# Patient Record
Sex: MALE | Race: WHITE | NOT HISPANIC OR LATINO | Employment: PART TIME | URBAN - METROPOLITAN AREA
[De-identification: names, ages, dates, MRNs, and addresses within clinical notes are randomized per-mention and may not be internally consistent; named-entity substitution may affect disease eponyms.]

---

## 2017-01-03 ENCOUNTER — OFFICE VISIT (OUTPATIENT)
Dept: NEPHROLOGY | Facility: CLINIC | Age: 37
End: 2017-01-03
Attending: INTERNAL MEDICINE
Payer: MEDICARE

## 2017-01-03 ENCOUNTER — TELEPHONE (OUTPATIENT)
Dept: OTOLARYNGOLOGY | Facility: CLINIC | Age: 37
End: 2017-01-03

## 2017-01-03 ENCOUNTER — RESULTS ONLY (OUTPATIENT)
Dept: OTHER | Facility: CLINIC | Age: 37
End: 2017-01-03

## 2017-01-03 VITALS
DIASTOLIC BLOOD PRESSURE: 80 MMHG | BODY MASS INDEX: 27.17 KG/M2 | TEMPERATURE: 98.5 F | OXYGEN SATURATION: 99 % | WEIGHT: 189.8 LBS | HEIGHT: 70 IN | HEART RATE: 76 BPM | SYSTOLIC BLOOD PRESSURE: 130 MMHG

## 2017-01-03 DIAGNOSIS — Z94.4 LIVER TRANSPLANTED (H): ICD-10-CM

## 2017-01-03 DIAGNOSIS — E83.42 HYPOMAGNESEMIA: ICD-10-CM

## 2017-01-03 DIAGNOSIS — Z94.0 KIDNEY REPLACED BY TRANSPLANT: ICD-10-CM

## 2017-01-03 DIAGNOSIS — R61 NIGHT SWEATS: ICD-10-CM

## 2017-01-03 DIAGNOSIS — Z48.298 AFTERCARE FOLLOWING ORGAN TRANSPLANT: ICD-10-CM

## 2017-01-03 DIAGNOSIS — D84.9 IMMUNOSUPPRESSED STATUS (H): ICD-10-CM

## 2017-01-03 DIAGNOSIS — N25.81 SECONDARY RENAL HYPERPARATHYROIDISM (H): ICD-10-CM

## 2017-01-03 DIAGNOSIS — R59.1 LYMPHADENOPATHY: Primary | ICD-10-CM

## 2017-01-03 DIAGNOSIS — R59.1 LYMPHADENOPATHY: ICD-10-CM

## 2017-01-03 DIAGNOSIS — Z94.4 LIVER REPLACED BY TRANSPLANT (H): ICD-10-CM

## 2017-01-03 DIAGNOSIS — D69.6 THROMBOCYTOPENIA (H): ICD-10-CM

## 2017-01-03 DIAGNOSIS — R59.9 LYMPH NODE ENLARGEMENT: ICD-10-CM

## 2017-01-03 LAB
ALBUMIN SERPL-MCNC: 4.6 G/DL (ref 3.4–5)
ALP SERPL-CCNC: 111 U/L (ref 40–150)
ALT SERPL W P-5'-P-CCNC: 24 U/L (ref 0–70)
ANION GAP SERPL CALCULATED.3IONS-SCNC: 6 MMOL/L (ref 3–14)
AST SERPL W P-5'-P-CCNC: 17 U/L (ref 0–45)
BASOPHILS # BLD AUTO: 0 10E9/L (ref 0–0.2)
BASOPHILS NFR BLD AUTO: 0.4 %
BILIRUB SERPL-MCNC: 0.6 MG/DL (ref 0.2–1.3)
BUN SERPL-MCNC: 18 MG/DL (ref 7–30)
CALCIUM SERPL-MCNC: 9.6 MG/DL (ref 8.5–10.1)
CHLORIDE SERPL-SCNC: 108 MMOL/L (ref 94–109)
CO2 SERPL-SCNC: 27 MMOL/L (ref 20–32)
CREAT SERPL-MCNC: 1.21 MG/DL (ref 0.66–1.25)
CREAT UR-MCNC: 105 MG/DL
DEPRECATED CALCIDIOL+CALCIFEROL SERPL-MC: 13 UG/L (ref 20–75)
DIFFERENTIAL METHOD BLD: ABNORMAL
EOSINOPHIL # BLD AUTO: 0.1 10E9/L (ref 0–0.7)
EOSINOPHIL NFR BLD AUTO: 1.1 %
ERYTHROCYTE [DISTWIDTH] IN BLOOD BY AUTOMATED COUNT: 12.4 % (ref 10–15)
GFR SERPL CREATININE-BSD FRML MDRD: 68 ML/MIN/1.7M2
GLUCOSE SERPL-MCNC: 91 MG/DL (ref 70–99)
HCT VFR BLD AUTO: 45 % (ref 40–53)
HGB BLD-MCNC: 15.3 G/DL (ref 13.3–17.7)
IMM GRANULOCYTES # BLD: 0.1 10E9/L (ref 0–0.4)
IMM GRANULOCYTES NFR BLD: 2 %
LYMPHOCYTES # BLD AUTO: 0.6 10E9/L (ref 0.8–5.3)
LYMPHOCYTES NFR BLD AUTO: 13.2 %
MAGNESIUM SERPL-MCNC: 2.3 MG/DL (ref 1.6–2.3)
MCH RBC QN AUTO: 30.3 PG (ref 26.5–33)
MCHC RBC AUTO-ENTMCNC: 34 G/DL (ref 31.5–36.5)
MCV RBC AUTO: 89 FL (ref 78–100)
MONOCYTES # BLD AUTO: 0.3 10E9/L (ref 0–1.3)
MONOCYTES NFR BLD AUTO: 6.2 %
NEUTROPHILS # BLD AUTO: 3.5 10E9/L (ref 1.6–8.3)
NEUTROPHILS NFR BLD AUTO: 77.1 %
NRBC # BLD AUTO: 0 10*3/UL
NRBC BLD AUTO-RTO: 0 /100
PHOSPHATE SERPL-MCNC: 2.5 MG/DL (ref 2.5–4.5)
PLATELET # BLD AUTO: 101 10E9/L (ref 150–450)
POTASSIUM SERPL-SCNC: 4.4 MMOL/L (ref 3.4–5.3)
PROT SERPL-MCNC: 8.1 G/DL (ref 6.8–8.8)
PROT UR-MCNC: 0.12 G/L
PROT/CREAT 24H UR: 0.12 G/G CR (ref 0–0.2)
PTH-INTACT SERPL-MCNC: 184 PG/ML (ref 12–72)
RBC # BLD AUTO: 5.05 10E12/L (ref 4.4–5.9)
SODIUM SERPL-SCNC: 140 MMOL/L (ref 133–144)
WBC # BLD AUTO: 4.5 10E9/L (ref 4–11)

## 2017-01-03 PROCEDURE — 99212 OFFICE O/P EST SF 10 MIN: CPT | Mod: ZF

## 2017-01-03 PROCEDURE — 87799 DETECT AGENT NOS DNA QUANT: CPT | Performed by: SURGERY

## 2017-01-03 PROCEDURE — 82306 VITAMIN D 25 HYDROXY: CPT | Performed by: SURGERY

## 2017-01-03 PROCEDURE — 84156 ASSAY OF PROTEIN URINE: CPT | Performed by: INTERNAL MEDICINE

## 2017-01-03 PROCEDURE — 83970 ASSAY OF PARATHORMONE: CPT | Performed by: SURGERY

## 2017-01-03 PROCEDURE — 80053 COMPREHEN METABOLIC PANEL: CPT | Performed by: SURGERY

## 2017-01-03 PROCEDURE — 86833 HLA CLASS II HIGH DEFIN QUAL: CPT | Performed by: TRANSPLANT SURGERY

## 2017-01-03 PROCEDURE — 86832 HLA CLASS I HIGH DEFIN QUAL: CPT | Performed by: TRANSPLANT SURGERY

## 2017-01-03 PROCEDURE — 85025 COMPLETE CBC W/AUTO DIFF WBC: CPT | Performed by: SURGERY

## 2017-01-03 PROCEDURE — 36415 COLL VENOUS BLD VENIPUNCTURE: CPT | Performed by: SURGERY

## 2017-01-03 PROCEDURE — 84100 ASSAY OF PHOSPHORUS: CPT | Performed by: SURGERY

## 2017-01-03 PROCEDURE — 83735 ASSAY OF MAGNESIUM: CPT | Performed by: SURGERY

## 2017-01-03 PROCEDURE — 80197 ASSAY OF TACROLIMUS: CPT | Performed by: SURGERY

## 2017-01-03 ASSESSMENT — PAIN SCALES - GENERAL: PAINLEVEL: NO PAIN (0)

## 2017-01-03 NOTE — PROGRESS NOTES
"Assessment and Plan:  1. DDKT - baseline Cr ~ 1.0-1.2, which has been a bit higher with last labs running ~ 1.3.  Patient did have DSA at time of transplant and will repeat DSA today.  Will also check urine prot/cr.  If creatinine remains elevated, would consider kidney transplant biopsy.  Will make no changes in immunosuppression.  2. HTN - well controlled at target of less than 140/90.  No changes.  3. Secondary renal hyperparathyroidism - will check PTH and vitamin D level.  4. Hypomagnesemia - normal serum magnesium level and will continue on oral magnesium supplement.  5. Thrombocytopenia - stable platelet level generally 60-80s.  6. Liver transplant - appears stable with normal LFTs.  7. Right anterior cervical lymph node - worrisome for lymphoma with no signs or symptoms of infection.  Will check serum EBV PCR and obtain neck CT for soft tissue.  Patient will likely require FNA.  8. Night sweats and fatigue - will check serum CMV and EBV PCR.  9. Recommend return visit in 3 months.    Assessment and plan was discussed with patient and he voiced his understanding and agreement.    Reason for Visit:  Mr. Chen is here for routine follow up.    HPI:   Cricket Chen is a 36 year old male with ESKD from [facility] and ESLD secondary alcoholic cirrhosis and is status post DDKT and liver transplant on 5/11/16.         Transplant Hx:       Tx: DDKT  Date: 5/11/16       Tx: Liver Tx Date: 5/11/16       Present Maintenance IS: Tacrolimus and Mycophenolate mofetil       Baseline Creatinine: 1.0-1.2       Recent DSA: Yes  Date last checked: 5/2016       Biopsy: No    Mr. Chen reports feeling good overall with some medical complaints.  He notes waking up about 10 days ago and seeing a large \"lymph node\" on the right side of his neck.  He notes it is not painful and hasn't increased in size.  No sore throat, sinus congestion or drainage, or tooth pain.  No fever, sweats or chills.  He did have some night sweats a couple " "of times last week, which is new.  He was seen in primary care clinic and rapid strept test was negative.  Nothing has changed in the mass since it showed up.    His energy level is \"good, not great\" as he feels a bit more tired, especially by the afternoon.  Patient feels this is because he has insomnia and has a hard time getting to sleep.  He is active, but really gets minimal exercise.  Denies any chest pain or shortness of breath with exertion.  Appetite is good and he is drinking plenty of fluids.  Weight is up about 20 lbs since transplant, but more stable lately.  No nausea, vomiting or diarrhea.  Slight leg swelling by the end of the day.    Home BP: 120/70s.      ROS:   A comprehensive review of systems was obtained and negative, except as noted in the HPI or PMH.    Active Medical Problems:  Patient Active Problem List   Diagnosis     Atrial flutter (H)     Complete transposition of great vessels     Esophageal varices (H)     Insomnia     Alcoholic hepatitis     History of alcohol abuse     History of transposition of great vessels     Depression     Thrombocytopenia (H)     Pain medication agreement     Congenital anomaly of heart     Liver replaced by transplant (H)     Kidney replaced by transplant     Immunosuppressed status (H)     Hypomagnesemia     Secondary renal hyperparathyroidism (H)     Chronic pain syndrome     Pain management contract signed     Aftercare following organ transplant       Personal Hx:  Social History     Social History     Marital Status: Single     Spouse Name: N/A     Number of Children: 0     Years of Education: N/A     Occupational History      Unemployed     Social History Main Topics     Smoking status: Former Smoker -- 0.33 packs/day for 3 years     Types: Cigarettes     Start date: 08/20/1997     Quit date: 09/21/2000     Smokeless tobacco: Former User     Alcohol Use: No      Comment: ~10 drinks per day for ten years, quit in April of 2014     Drug Use: No     " "Sexual Activity:     Partners: Female     Birth Control/ Protection: None     Other Topics Concern     Not on file     Social History Narrative    ? Blood transfusion as baby during surgery,    Professional performed tattoos     No Illicit IV or intranasal drug use.        Allergies:  No Known Allergies    Medications:  Prior to Admission medications    Medication Sig Start Date End Date Taking? Authorizing Provider   traMADol (ULTRAM) 50 MG tablet Take 1 tablet (50 mg) by mouth every 8 hours as needed for moderate pain 12/14/16  Yes Antoinette Nelson MD   magnesium oxide (MAG-OX) 400 (241.3 MG) MG tablet Take 2 tablets (800 mg) by mouth 2 times daily 12/8/16  Yes Jake Sidhu MD   PROGRAF 1 MG PO CAPSULE 4 mg AM and 3 mg PM 10/26/16  Yes Laureen Galvan MD   amLODIPine (NORVASC) 10 MG tablet Take 1 tablet (10 mg) by mouth daily 10/11/16  Yes Jake Sidhu MD   lisinopril (PRINIVIL,ZESTRIL) 5 MG tablet Take 1 tablet (5 mg) by mouth daily 10/11/16  Yes Jake Sidhu MD   mycophenolate (CELLCEPT - GENERIC EQUIVALENT) 250 MG capsule Take 3 capsules (750 mg) by mouth 2 times daily 8/5/16  Yes Vinod Bermeo MD   sulfamethoxazole-trimethoprim (BACTRIM,SEPTRA) 400-80 MG per tablet Take 1 tablet by mouth daily 5/17/16  Yes Lorenza Alonso PA-C   pantoprazole (PROTONIX) 20 MG tablet Take 1 tablet (20 mg) by mouth daily 3/16/16  Yes Laureen Galvan MD       Vitals:  /80 mmHg  Pulse 76  Temp(Src) 98.5  F (36.9  C) (Oral)  Ht 1.778 m (5' 10\")  Wt 86.093 kg (189 lb 12.8 oz)  BMI 27.23 kg/m2  SpO2 99%    Exam:   GENERAL APPEARANCE: alert and no distress  HENT: mouth without ulcers or lesions  LYMPHATICS: 2 x 2 cm right anterio cervical lymph node that is nontender; no other cervical or supraclavicular nodes  RESP: lungs clear to auscultation - no rales, rhonchi or wheezes  CV: regular rhythm, normal rate, no rub, no murmur  EDEMA: no LE " edema bilaterally  ABDOMEN: soft, nondistended, nontender, bowel sounds normal  MS: extremities normal - no gross deformities noted, no evidence of inflammation in joints, no muscle tenderness  SKIN: no rash  TX KIDNEY: normal    Results:   Recent Results (from the past 504 hour(s))   CBC with platelets    Collection Time: 12/19/16 10:44 AM   Result Value Ref Range    WBC 4.8 4.0 - 11.0 10e9/L    RBC Count 4.93 4.4 - 5.9 10e12/L    Hemoglobin 14.7 13.3 - 17.7 g/dL    Hematocrit 44.5 40.0 - 53.0 %    MCV 90 78 - 100 fl    MCH 29.8 26.5 - 33.0 pg    MCHC 33.0 31.5 - 36.5 g/dL    RDW 12.8 10.0 - 15.0 %    Platelet Count 90 (L) 150 - 450 10e9/L   Basic metabolic panel    Collection Time: 12/19/16 10:44 AM   Result Value Ref Range    Sodium 140 133 - 144 mmol/L    Potassium 4.5 3.4 - 5.3 mmol/L    Chloride 108 94 - 109 mmol/L    Carbon Dioxide 23 20 - 32 mmol/L    Anion Gap 9 3 - 14 mmol/L    Glucose 108 (H) 70 - 99 mg/dL    Urea Nitrogen 16 7 - 30 mg/dL    Creatinine 1.30 (H) 0.66 - 1.25 mg/dL    GFR Estimate 62 >60 mL/min/1.7m2    GFR Estimate If Black 75 >60 mL/min/1.7m2    Calcium 9.6 8.5 - 10.1 mg/dL   Phosphorus    Collection Time: 12/19/16 10:44 AM   Result Value Ref Range    Phosphorus 2.8 2.5 - 4.5 mg/dL   Magnesium    Collection Time: 12/19/16 10:44 AM   Result Value Ref Range    Magnesium 1.6 1.6 - 2.3 mg/dL   Hepatic panel    Collection Time: 12/19/16 10:44 AM   Result Value Ref Range    Bilirubin Direct 0.1 0.0 - 0.2 mg/dL    Bilirubin Total 0.4 0.2 - 1.3 mg/dL    Albumin 4.3 3.4 - 5.0 g/dL    Protein Total 7.3 6.8 - 8.8 g/dL    Alkaline Phosphatase 92 40 - 150 U/L    ALT 34 0 - 70 U/L    AST 18 0 - 45 U/L   Tacrolimus level    Collection Time: 12/19/16 10:45 AM   Result Value Ref Range    Tacrolimus Last Dose  at 10:45 pm on 12/18/2016     Tacrolimus Level 12.1 5.0 - 15.0 ug/L   Strep, Rapid Screen    Collection Time: 12/26/16 12:30 PM   Result Value Ref Range    Specimen Description Throat     Rapid Strep  A Screen       NEGATIVE: No Group A streptococcal antigen detected by immunoassay, await   culture report.      Micro Report Status FINAL 12/26/2016    Beta strep group A culture    Collection Time: 12/26/16 12:30 PM   Result Value Ref Range    Specimen Description Throat     Culture Micro No Beta Streptococcus isolated     Micro Report Status FINAL 12/28/2016    CBC with platelets differential    Collection Time: 12/26/16 12:38 PM   Result Value Ref Range    WBC 5.2 4.0 - 11.0 10e9/L    RBC Count 4.80 4.4 - 5.9 10e12/L    Hemoglobin 14.8 13.3 - 17.7 g/dL    Hematocrit 42.9 40.0 - 53.0 %    MCV 89 78 - 100 fl    MCH 30.8 26.5 - 33.0 pg    MCHC 34.5 31.5 - 36.5 g/dL    RDW 12.6 10.0 - 15.0 %    Platelet Count 84 (L) 150 - 450 10e9/L    Diff Method Automated Method     % Neutrophils 78.1 %    % Lymphocytes 12.5 %    % Monocytes 7.7 %    % Eosinophils 1.3 %    % Basophils 0.4 %    Absolute Neutrophil 4.1 1.6 - 8.3 10e9/L    Absolute Lymphocytes 0.7 (L) 0.8 - 5.3 10e9/L    Absolute Monocytes 0.4 0.0 - 1.3 10e9/L    Absolute Eosinophils 0.1 0.0 - 0.7 10e9/L    Absolute Basophils 0.0 0.0 - 0.2 10e9/L

## 2017-01-03 NOTE — MR AVS SNAPSHOT
After Visit Summary   1/3/2017    Cricket Chen    MRN: [id number]           Patient Information     Date Of Birth          1980        Visit Information        Provider Department      1/3/2017 12:00 PM Jake Sidhu MD [facility] Ne[facility]        Today's Diagnoses     Lymphadenopathy    -  1     Kidney replaced by transplant         Night sweats         Secondary renal hyperparathyroidism (H)         Immunosuppressed status (H)         Liver replaced by transplant (H)         Hypomagnesemia         Thrombocytopenia (H)         Aftercare following organ transplant            Follow-ups after your visit        Follow-up notes from your care team     Return in about 3 months (around 4/3/2017).      Your next 10 appointments already scheduled     Jan 23, 2017 10:45 AM   [facility]    [address] [place] [id number] [phone]           Please do not eat 10-12 hours before your appointment if you are coming in fasting for labs on lipids, cholesterol, or glucose (sugar). Does not apply to pregnant women.  Water with medications is okay. Do not drink coffee or other fluids.  If you have concerns about taking  your medications, please send a message by clicking on Secure Messaging, Message Your Care Team.            Apr 04, 2017 12:10 PM   (Arrive by 11:55 AM)   Return General Liver with Laureen Galvan MD   [facility] Hepat[facility] ([facility])    [address] [place] [address] [id number] [phone]            Apr 18, 2017 10:00 AM   (Arrive by 9:30 AM)   Return Kidney Transplant with Jake Sidhu MD   [facility]ph[facility] ([facility])    [address] [place] [id number] [phone]              Future tests that were ordered for you today     Open Future Orders        Priority Expected Expires  "Ordered    Protein  random urine Routine  2/2/2017 1/3/2017    Parathyroid Hormone Intact Routine  2/2/2017 1/3/2017    Vitamin D Deficiency Routine  2/2/2017 1/3/2017    CMV DNA quantification Routine  2/2/2017 1/3/2017    EBV DNA PCR Quantitative Whole Blood Routine  2/2/2017 1/3/2017    Basic metabolic panel Routine  2/2/2017 1/3/2017    CBC with platelets Routine  2/2/2017 1/3/2017    CT Soft Tissue Neck w/o & w Contrast Routine  1/3/2018 1/3/2017            Who to contact     If you have questions or need follow up information about today's clinic visit or your schedule please contact [facility] NE[facility]ROLOG[facility] directly at [phone].  Normal or non-critical lab and imaging results will be communicated to you by [employer], letter or phone within 4 business days after the clinic has received the results. If you do not hear from us within 7 days, please contact the clinic through [employer] or phone. If you have a critical or abnormal lab result, we will notify you by phone as soon as possible.  Submit refill requests through [employer] or call your pharmacy and they will forward the refill request to us. Please allow 3 business days for your refill to be completed.          Additional Information About Your Visit        [employer] Information     [employer] gives you secure access to your electronic health record. If you see a primary care provider, you can also send messages to your care team and make appointments. If you have questions, please call your primary care clinic.  If you do not have a primary care provider, please call [phone] and they will assist you.        Care EveryWhere ID     This is your Care EveryWhere ID. This could be used by other organizations to access your [place] medical records  [id number]        Your Vitals Were     Pulse Temperature Height BMI (Body Mass Index) Pulse Oximetry       76 98.5  F (36.9  C) (Oral) 1.778 m (5' 10\") 27.23 kg/m2 99%        Blood Pressure from Last 3 Encounters: "   01/03/17 130/80   12/26/16 128/79   09/27/16 119/72    Weight from Last 3 Encounters:   01/03/17 86.093 kg (189 lb 12.8 oz)   12/26/16 87.091 kg (192 lb)   09/27/16 76.703 kg (169 lb 1.6 oz)              We Performed the Following     PRA Donor Specific Antibody        Primary Care Provider Office Phone # Fax #    Eugenia Perez [phone]       [address]  [facility] [address]        Thank you!     Thank you for choosing [facility] NEPH[facility]OLOG[facility]  for your care. Our goal is always to provide you with excellent care. Hearing back from our patients is one way we can continue to improve our services. Please take a few minutes to complete the written survey that you may receive in the mail after your visit with us. Thank you!             Your Updated Medication List - Protect others around you: Learn how to safely use, store and throw away your medicines at www.disposemymeds.org.          This list is accurate as of: 1/3/17 12:45 PM.  Always use your most recent med list.                   Brand Name Dispense Instructions for use    amLODIPine 10 MG tablet    NORVASC    90 tablet    Take 1 tablet (10 mg) by mouth daily       lisinopril 5 MG tablet    PRINIVIL/ZESTRIL    90 tablet    Take 1 tablet (5 mg) by mouth daily       magnesium oxide 400 (241.3 MG) MG tablet    MAG-OX    120 tablet    Take 2 tablets (800 mg) by mouth 2 times daily       mycophenolate 250 MG capsule    CELLCEPT - GENERIC EQUIVALENT    180 capsule    Take 3 capsules (750 mg) by mouth 2 times daily       pantoprazole 20 MG EC tablet    PROTONIX    90 tablet    Take 1 tablet (20 mg) by mouth daily       sulfamethoxazole-trimethoprim 400-80 MG per tablet    BACTRIM/SEPTRA    30 tablet    Take 1 tablet by mouth daily       tacrolimus capsule     210 capsule    4 mg AM and 3 mg PM       traMADol 50 MG tablet    ULTRAM    45 tablet    Take 1 tablet (50 mg) by mouth every 8 hours as needed for moderate  pain

## 2017-01-03 NOTE — NURSING NOTE
"Chief Complaint   Patient presents with     RECHECK     kidney transplant follow up       Initial /80 mmHg  Pulse 76  Temp(Src) 98.5  F (36.9  C) (Oral)  Ht 1.778 m (5' 10\")  Wt 86.093 kg (189 lb 12.8 oz)  BMI 27.23 kg/m2  SpO2 99% Estimated body mass index is 27.23 kg/(m^2) as calculated from the following:    Height as of this encounter: 1.778 m (5' 10\").    Weight as of this encounter: 86.093 kg (189 lb 12.8 oz).  BP completed using cuff size: regular    "

## 2017-01-03 NOTE — Clinical Note
"1/3/2017      RE: Cricket Chen  [address] [facility] [address]       Assessment and Plan:  1. DDKT - baseline Cr ~ 1.0-1.2, which has been a bit higher with last labs running ~ 1.3.  Patient did have DSA at time of transplant and will repeat DSA today.  Will also check urine prot/cr.  If creatinine remains elevated, would consider kidney transplant biopsy.  Will make no changes in immunosuppression.  2. HTN - well controlled at target of less than 140/90.  No changes.  3. Secondary renal hyperparathyroidism - will check PTH and vitamin D level.  4. Hypomagnesemia - normal serum magnesium level and will continue on oral magnesium supplement.  5. Thrombocytopenia - stable platelet level generally 60-80s.  6. Liver transplant - appears stable with normal LFTs.  7. Right anterior cervical lymph node - worrisome for lymphoma with no signs or symptoms of infection.  Will check serum EBV PCR and obtain neck CT for soft tissue.  Patient will likely require FNA.  8. Night sweats and fatigue - will check serum CMV and EBV PCR.  9. Recommend return visit in 3 months.    Assessment and plan was discussed with patient and he voiced his understanding and agreement.    Reason for Visit:  Mr. Chen is here for routine follow up.    HPI:   Cricket Chen is a 36 year old male with ESKD from [facility] and ESLD secondary alcoholic cirrhosis and is status post DDKT and liver transplant on 5/11/16.         Transplant Hx:       Tx: DDKT  Date: 5/11/16       Tx: Liver Tx Date: 5/11/16       Present Maintenance IS: Tacrolimus and Mycophenolate mofetil       Baseline Creatinine: 1.0-1.2       Recent DSA: Yes  Date last checked: 5/2016       Biopsy: No    Mr. Chen reports feeling good overall with some medical complaints.  He notes waking up about 10 days ago and seeing a large \"lymph node\" on the right side of his neck.  He notes it is not painful and hasn't increased in size.  No sore throat, sinus congestion or " "drainage, or tooth pain.  No fever, sweats or chills.  He did have some night sweats a couple of times last week, which is new.  He was seen in primary care clinic and rapid strept test was negative.  Nothing has changed in the mass since it showed up.    His energy level is \"good, not great\" as he feels a bit more tired, especially by the afternoon.  Patient feels this is because he has insomnia and has a hard time getting to sleep.  He is active, but really gets minimal exercise.  Denies any chest pain or shortness of breath with exertion.  Appetite is good and he is drinking plenty of fluids.  Weight is up about 20 lbs since transplant, but more stable lately.  No nausea, vomiting or diarrhea.  Slight leg swelling by the end of the day.    Home BP: 120/70s.      ROS:   A comprehensive review of systems was obtained and negative, except as noted in the HPI or PMH.    Active Medical Problems:  Patient Active Problem List   Diagnosis     Atrial flutter (H)     Complete transposition of great vessels     Esophageal varices (H)     Insomnia     Alcoholic hepatitis     History of alcohol abuse     History of transposition of great vessels     Depression     Thrombocytopenia (H)     Pain medication agreement     Congenital anomaly of heart     Liver replaced by transplant (H)     Kidney replaced by transplant     Immunosuppressed status (H)     Hypomagnesemia     Secondary renal hyperparathyroidism (H)     Chronic pain syndrome     Pain management contract signed     Aftercare following organ transplant       Personal Hx:  Social History     Social History     Marital Status: Single     Spouse Name: N/A     Number of Children: 0     Years of Education: N/A     Occupational History      Unemployed     Social History Main Topics     Smoking status: Former Smoker -- 0.33 packs/day for 3 years     Types: Cigarettes     Start date: 08/20/1997     Quit date: 09/21/2000     Smokeless tobacco: Former User     Alcohol Use: No    " "  Comment: ~10 drinks per day for ten years, quit in April of 2014     Drug Use: No     Sexual Activity:     Partners: Female     Birth Control/ Protection: None     Other Topics Concern     Not on file     Social History Narrative    ? Blood transfusion as baby during surgery,    Professional performed tattoos     No Illicit IV or intranasal drug use.        Allergies:  No Known Allergies    Medications:  Prior to Admission medications    Medication Sig Start Date End Date Taking? Authorizing Provider   traMADol (ULTRAM) 50 MG tablet Take 1 tablet (50 mg) by mouth every 8 hours as needed for moderate pain 12/14/16  Yes Antoinette Nelson MD   magnesium oxide (MAG-OX) 400 (241.3 MG) MG tablet Take 2 tablets (800 mg) by mouth 2 times daily 12/8/16  Yes Jake Sidhu MD   PROGRAF 1 MG PO CAPSULE 4 mg AM and 3 mg PM 10/26/16  Yes Laureen Galvan MD   amLODIPine (NORVASC) 10 MG tablet Take 1 tablet (10 mg) by mouth daily 10/11/16  Yes Jake Sidhu MD   lisinopril (PRINIVIL,ZESTRIL) 5 MG tablet Take 1 tablet (5 mg) by mouth daily 10/11/16  Yes Jake Sidhu MD   mycophenolate (CELLCEPT - GENERIC EQUIVALENT) 250 MG capsule Take 3 capsules (750 mg) by mouth 2 times daily 8/5/16  Yes Vinod Bermeo MD   sulfamethoxazole-trimethoprim (BACTRIM,SEPTRA) 400-80 MG per tablet Take 1 tablet by mouth daily 5/17/16  Yes Lorenza Alonso PA-C   pantoprazole (PROTONIX) 20 MG tablet Take 1 tablet (20 mg) by mouth daily 3/16/16  Yes Laureen Galvan MD       Vitals:  /80 mmHg  Pulse 76  Temp(Src) 98.5  F (36.9  C) (Oral)  Ht 1.778 m (5' 10\")  Wt 86.093 kg (189 lb 12.8 oz)  BMI 27.23 kg/m2  SpO2 99%    Exam:   GENERAL APPEARANCE: alert and no distress  HENT: mouth without ulcers or lesions  LYMPHATICS: 2 x 2 cm right anterio cervical lymph node that is nontender; no other cervical or supraclavicular nodes  RESP: lungs clear to auscultation - no rales, " rhonchi or wheezes  CV: regular rhythm, normal rate, no rub, no murmur  EDEMA: no LE edema bilaterally  ABDOMEN: soft, nondistended, nontender, bowel sounds normal  MS: extremities normal - no gross deformities noted, no evidence of inflammation in joints, no muscle tenderness  SKIN: no rash  TX KIDNEY: normal    Results:   Recent Results (from the past 504 hour(s))   CBC with platelets    Collection Time: 12/19/16 10:44 AM   Result Value Ref Range    WBC 4.8 4.0 - 11.0 10e9/L    RBC Count 4.93 4.4 - 5.9 10e12/L    Hemoglobin 14.7 13.3 - 17.7 g/dL    Hematocrit 44.5 40.0 - 53.0 %    MCV 90 78 - 100 fl    MCH 29.8 26.5 - 33.0 pg    MCHC 33.0 31.5 - 36.5 g/dL    RDW 12.8 10.0 - 15.0 %    Platelet Count 90 (L) 150 - 450 10e9/L   Basic metabolic panel    Collection Time: 12/19/16 10:44 AM   Result Value Ref Range    Sodium 140 133 - 144 mmol/L    Potassium 4.5 3.4 - 5.3 mmol/L    Chloride 108 94 - 109 mmol/L    Carbon Dioxide 23 20 - 32 mmol/L    Anion Gap 9 3 - 14 mmol/L    Glucose 108 (H) 70 - 99 mg/dL    Urea Nitrogen 16 7 - 30 mg/dL    Creatinine 1.30 (H) 0.66 - 1.25 mg/dL    GFR Estimate 62 >60 mL/min/1.7m2    GFR Estimate If Black 75 >60 mL/min/1.7m2    Calcium 9.6 8.5 - 10.1 mg/dL   Phosphorus    Collection Time: 12/19/16 10:44 AM   Result Value Ref Range    Phosphorus 2.8 2.5 - 4.5 mg/dL   Magnesium    Collection Time: 12/19/16 10:44 AM   Result Value Ref Range    Magnesium 1.6 1.6 - 2.3 mg/dL   Hepatic panel    Collection Time: 12/19/16 10:44 AM   Result Value Ref Range    Bilirubin Direct 0.1 0.0 - 0.2 mg/dL    Bilirubin Total 0.4 0.2 - 1.3 mg/dL    Albumin 4.3 3.4 - 5.0 g/dL    Protein Total 7.3 6.8 - 8.8 g/dL    Alkaline Phosphatase 92 40 - 150 U/L    ALT 34 0 - 70 U/L    AST 18 0 - 45 U/L   Tacrolimus level    Collection Time: 12/19/16 10:45 AM   Result Value Ref Range    Tacrolimus Last Dose  at 10:45 pm on 12/18/2016     Tacrolimus Level 12.1 5.0 - 15.0 ug/L   Salima Rapid Screen    Collection Time:  12/26/16 12:30 PM   Result Value Ref Range    Specimen Description Throat     Rapid Strep A Screen       NEGATIVE: No Group A streptococcal antigen detected by immunoassay, await   culture report.      Micro Report Status FINAL 12/26/2016    Beta strep group A culture    Collection Time: 12/26/16 12:30 PM   Result Value Ref Range    Specimen Description Throat     Culture Micro No Beta Streptococcus isolated     Micro Report Status FINAL 12/28/2016    CBC with platelets differential    Collection Time: 12/26/16 12:38 PM   Result Value Ref Range    WBC 5.2 4.0 - 11.0 10e9/L    RBC Count 4.80 4.4 - 5.9 10e12/L    Hemoglobin 14.8 13.3 - 17.7 g/dL    Hematocrit 42.9 40.0 - 53.0 %    MCV 89 78 - 100 fl    MCH 30.8 26.5 - 33.0 pg    MCHC 34.5 31.5 - 36.5 g/dL    RDW 12.6 10.0 - 15.0 %    Platelet Count 84 (L) 150 - 450 10e9/L    Diff Method Automated Method     % Neutrophils 78.1 %    % Lymphocytes 12.5 %    % Monocytes 7.7 %    % Eosinophils 1.3 %    % Basophils 0.4 %    Absolute Neutrophil 4.1 1.6 - 8.3 10e9/L    Absolute Lymphocytes 0.7 (L) 0.8 - 5.3 10e9/L    Absolute Monocytes 0.4 0.0 - 1.3 10e9/L    Absolute Eosinophils 0.1 0.0 - 0.7 10e9/L    Absolute Basophils 0.0 0.0 - 0.2 10e9/L       Jake Sidhu MD

## 2017-01-03 NOTE — TELEPHONE ENCOUNTER
I received a 4:40  PM  phone call from ENT resident   She was paged by transplant team/ Dr Sidhu to get patient seen for lymph node biopsy ASAP.  The patient's coordinator is @ [phone].      Liver and kidney transplant on 05/11/2016 for alcoholic cirrhosis and end-stage kidney disease on hemodialysis at that time.   Platelets 101 today, no INR done.    CT scan 1/3/16:   1. Conglomerate, necrotic lymph mass in the right level 2A/3,  differential includes metastatic nodes from unknown primary,  posttransplant lymphoproliferative disorder versus infection.    2. Multiple, discrete, bilateral cervical lymph node  3.Prominent palatine tonsils    Plan to call patient with the appointment time- possibly 1/4 at 3;20 with Dr Solano, or alternate time in next 24-48 hours.  Likely needs biopsy in OR, evaluate for lymphoma vs SCCa  Message to triage staff, Care Coordinator to contact patient.   I sent patient message that we were working on a time for appt., hopefully for 1/4/2016

## 2017-01-03 NOTE — Clinical Note
1/3/2017       RE: Cricket Chen  [address] [facility] [address]     Dear Colleague,    Thank you for referring your patient, Cricket Chen, to the [facility]PHROLOGY at [facility]. Please see a copy of my visit note below.    Assessment and Plan:  1. DDKT - baseline Cr ~ 1.0-1.2, which has been a bit higher with last labs running ~ 1.3.  Patient did have DSA at time of transplant and will repeat DSA today.  Will also check urine prot/cr.  If creatinine remains elevated, would consider kidney transplant biopsy.  Will make no changes in immunosuppression.  2. HTN - well controlled at target of less than 140/90.  No changes.  3. Secondary renal hyperparathyroidism - will check PTH and vitamin D level.  4. Hypomagnesemia - normal serum magnesium level and will continue on oral magnesium supplement.  5. Thrombocytopenia - stable platelet level generally 60-80s.  6. Liver transplant - appears stable with normal LFTs.  7. Right anterior cervical lymph node - worrisome for lymphoma with no signs or symptoms of infection.  Will check serum EBV PCR and obtain neck CT for soft tissue.  Patient will likely require FNA.  8. Night sweats and fatigue - will check serum CMV and EBV PCR.  9. Recommend return visit in 3 months.    Assessment and plan was discussed with patient and he voiced his understanding and agreement.    Reason for Visit:  Mr. Chen is here for routine follow up.    HPI:   Cricket Chen is a 36 year old male with ESKD from [facility] and ESLD secondary alcoholic cirrhosis and is status post DDKT and liver transplant on 5/11/16.         Transplant Hx:       Tx: DDKT  Date: 5/11/16       Tx: Liver Tx Date: 5/11/16       Present Maintenance IS: Tacrolimus and Mycophenolate mofetil       Baseline Creatinine: 1.0-1.2       Recent DSA: Yes  Date last checked: 5/2016       Biopsy: No    Mr. Chen reports feeling good overall with some medical complaints.  He  "notes waking up about 10 days ago and seeing a large \"lymph node\" on the right side of his neck.  He notes it is not painful and hasn't increased in size.  No sore throat, sinus congestion or drainage, or tooth pain.  No fever, sweats or chills.  He did have some night sweats a couple of times last week, which is new.  He was seen in primary care clinic and rapid strept test was negative.  Nothing has changed in the mass since it showed up.    His energy level is \"good, not great\" as he feels a bit more tired, especially by the afternoon.  Patient feels this is because he has insomnia and has a hard time getting to sleep.  He is active, but really gets minimal exercise.  Denies any chest pain or shortness of breath with exertion.  Appetite is good and he is drinking plenty of fluids.  Weight is up about 20 lbs since transplant, but more stable lately.  No nausea, vomiting or diarrhea.  Slight leg swelling by the end of the day.    Home BP: 120/70s.      ROS:   A comprehensive review of systems was obtained and negative, except as noted in the HPI or PMH.    Active Medical Problems:  Patient Active Problem List   Diagnosis     Atrial flutter (H)     Complete transposition of great vessels     Esophageal varices (H)     Insomnia     Alcoholic hepatitis     History of alcohol abuse     History of transposition of great vessels     Depression     Thrombocytopenia (H)     Pain medication agreement     Congenital anomaly of heart     Liver replaced by transplant (H)     Kidney replaced by transplant     Immunosuppressed status (H)     Hypomagnesemia     Secondary renal hyperparathyroidism (H)     Chronic pain syndrome     Pain management contract signed     Aftercare following organ transplant       Personal Hx:  Social History     Social History     Marital Status: Single     Spouse Name: N/A     Number of Children: 0     Years of Education: N/A     Occupational History      Unemployed     Social History Main Topics     " "Smoking status: Former Smoker -- 0.33 packs/day for 3 years     Types: Cigarettes     Start date: 08/20/1997     Quit date: 09/21/2000     Smokeless tobacco: Former User     Alcohol Use: No      Comment: ~10 drinks per day for ten years, quit in April of 2014     Drug Use: No     Sexual Activity:     Partners: Female     Birth Control/ Protection: None     Other Topics Concern     Not on file     Social History Narrative    ? Blood transfusion as baby during surgery,    Professional performed tattoos     No Illicit IV or intranasal drug use.        Allergies:  No Known Allergies    Medications:  Prior to Admission medications    Medication Sig Start Date End Date Taking? Authorizing Provider   traMADol (ULTRAM) 50 MG tablet Take 1 tablet (50 mg) by mouth every 8 hours as needed for moderate pain 12/14/16  Yes Antoinette Nelson MD   magnesium oxide (MAG-OX) 400 (241.3 MG) MG tablet Take 2 tablets (800 mg) by mouth 2 times daily 12/8/16  Yes Jake Sidhu MD   PROGRAF 1 MG PO CAPSULE 4 mg AM and 3 mg PM 10/26/16  Yes Laureen Galvan MD   amLODIPine (NORVASC) 10 MG tablet Take 1 tablet (10 mg) by mouth daily 10/11/16  Yes Jake Sidhu MD   lisinopril (PRINIVIL,ZESTRIL) 5 MG tablet Take 1 tablet (5 mg) by mouth daily 10/11/16  Yes Jake Sidhu MD   mycophenolate (CELLCEPT - GENERIC EQUIVALENT) 250 MG capsule Take 3 capsules (750 mg) by mouth 2 times daily 8/5/16  Yes Vinod Bermeo MD   sulfamethoxazole-trimethoprim (BACTRIM,SEPTRA) 400-80 MG per tablet Take 1 tablet by mouth daily 5/17/16  Yes Lorenza Alonso PA-C   pantoprazole (PROTONIX) 20 MG tablet Take 1 tablet (20 mg) by mouth daily 3/16/16  Yes Laureen Galvan MD       Vitals:  /80 mmHg  Pulse 76  Temp(Src) 98.5  F (36.9  C) (Oral)  Ht 1.778 m (5' 10\")  Wt 86.093 kg (189 lb 12.8 oz)  BMI 27.23 kg/m2  SpO2 99%    Exam:   GENERAL APPEARANCE: alert and no distress  HENT: " mouth without ulcers or lesions  LYMPHATICS: 2 x 2 cm right anterio cervical lymph node that is nontender; no other cervical or supraclavicular nodes  RESP: lungs clear to auscultation - no rales, rhonchi or wheezes  CV: regular rhythm, normal rate, no rub, no murmur  EDEMA: no LE edema bilaterally  ABDOMEN: soft, nondistended, nontender, bowel sounds normal  MS: extremities normal - no gross deformities noted, no evidence of inflammation in joints, no muscle tenderness  SKIN: no rash  TX KIDNEY: normal    Results:   Recent Results (from the past 504 hour(s))   CBC with platelets    Collection Time: 12/19/16 10:44 AM   Result Value Ref Range    WBC 4.8 4.0 - 11.0 10e9/L    RBC Count 4.93 4.4 - 5.9 10e12/L    Hemoglobin 14.7 13.3 - 17.7 g/dL    Hematocrit 44.5 40.0 - 53.0 %    MCV 90 78 - 100 fl    MCH 29.8 26.5 - 33.0 pg    MCHC 33.0 31.5 - 36.5 g/dL    RDW 12.8 10.0 - 15.0 %    Platelet Count 90 (L) 150 - 450 10e9/L   Basic metabolic panel    Collection Time: 12/19/16 10:44 AM   Result Value Ref Range    Sodium 140 133 - 144 mmol/L    Potassium 4.5 3.4 - 5.3 mmol/L    Chloride 108 94 - 109 mmol/L    Carbon Dioxide 23 20 - 32 mmol/L    Anion Gap 9 3 - 14 mmol/L    Glucose 108 (H) 70 - 99 mg/dL    Urea Nitrogen 16 7 - 30 mg/dL    Creatinine 1.30 (H) 0.66 - 1.25 mg/dL    GFR Estimate 62 >60 mL/min/1.7m2    GFR Estimate If Black 75 >60 mL/min/1.7m2    Calcium 9.6 8.5 - 10.1 mg/dL   Phosphorus    Collection Time: 12/19/16 10:44 AM   Result Value Ref Range    Phosphorus 2.8 2.5 - 4.5 mg/dL   Magnesium    Collection Time: 12/19/16 10:44 AM   Result Value Ref Range    Magnesium 1.6 1.6 - 2.3 mg/dL   Hepatic panel    Collection Time: 12/19/16 10:44 AM   Result Value Ref Range    Bilirubin Direct 0.1 0.0 - 0.2 mg/dL    Bilirubin Total 0.4 0.2 - 1.3 mg/dL    Albumin 4.3 3.4 - 5.0 g/dL    Protein Total 7.3 6.8 - 8.8 g/dL    Alkaline Phosphatase 92 40 - 150 U/L    ALT 34 0 - 70 U/L    AST 18 0 - 45 U/L   Tacrolimus level     Collection Time: 12/19/16 10:45 AM   Result Value Ref Range    Tacrolimus Last Dose  at 10:45 pm on 12/18/2016     Tacrolimus Level 12.1 5.0 - 15.0 ug/L   Strep, Rapid Screen    Collection Time: 12/26/16 12:30 PM   Result Value Ref Range    Specimen Description Throat     Rapid Strep A Screen       NEGATIVE: No Group A streptococcal antigen detected by immunoassay, await   culture report.      Micro Report Status FINAL 12/26/2016    Beta strep group A culture    Collection Time: 12/26/16 12:30 PM   Result Value Ref Range    Specimen Description Throat     Culture Micro No Beta Streptococcus isolated     Micro Report Status FINAL 12/28/2016    CBC with platelets differential    Collection Time: 12/26/16 12:38 PM   Result Value Ref Range    WBC 5.2 4.0 - 11.0 10e9/L    RBC Count 4.80 4.4 - 5.9 10e12/L    Hemoglobin 14.8 13.3 - 17.7 g/dL    Hematocrit 42.9 40.0 - 53.0 %    MCV 89 78 - 100 fl    MCH 30.8 26.5 - 33.0 pg    MCHC 34.5 31.5 - 36.5 g/dL    RDW 12.6 10.0 - 15.0 %    Platelet Count 84 (L) 150 - 450 10e9/L    Diff Method Automated Method     % Neutrophils 78.1 %    % Lymphocytes 12.5 %    % Monocytes 7.7 %    % Eosinophils 1.3 %    % Basophils 0.4 %    Absolute Neutrophil 4.1 1.6 - 8.3 10e9/L    Absolute Lymphocytes 0.7 (L) 0.8 - 5.3 10e9/L    Absolute Monocytes 0.4 0.0 - 1.3 10e9/L    Absolute Eosinophils 0.1 0.0 - 0.7 10e9/L    Absolute Basophils 0.0 0.0 - 0.2 10e9/L       Again, thank you for allowing me to participate in the care of your patient.      Sincerely,    Jake Sidhu MD

## 2017-01-04 DIAGNOSIS — E55.9 VITAMIN D DEFICIENCY: Primary | ICD-10-CM

## 2017-01-04 LAB
CMV DNA SPEC NAA+PROBE-ACNC: NORMAL [IU]/ML
CMV DNA SPEC NAA+PROBE-LOG#: NORMAL {LOG_IU}/ML
PRA DONOR SPECIFIC ABY: NORMAL
SPECIMEN SOURCE: NORMAL

## 2017-01-04 RX ORDER — CHOLECALCIFEROL (VITAMIN D3) 50 MCG
2000 TABLET ORAL DAILY
Qty: 100 TABLET | Refills: 3 | Status: ON HOLD | OUTPATIENT
Start: 2017-01-04 | End: 2017-08-07

## 2017-01-04 NOTE — TELEPHONE ENCOUNTER
Spoke with patient regarding results note, vitamin D deficiency and would recommend cholecalciferol 2000 iu daily. Patient verbalized medication change and sent out with pharmacy patient requested.     Seble Davis

## 2017-01-05 DIAGNOSIS — Z94.4 LIVER REPLACED BY TRANSPLANT (H): Primary | ICD-10-CM

## 2017-01-05 LAB
SA1 CELL: NORMAL
SA1 COMMENTS: NORMAL
SA1 HI RISK ABY: NORMAL
SA1 MOD RISK ABY: NORMAL
SA1 TEST METHOD: NORMAL
SA2 CELL: NORMAL
SA2 COMMENTS: NORMAL
SA2 HI RISK ABY UA: NORMAL
SA2 MOD RISK ABY: NORMAL
SA2 TEST METHOD: NORMAL

## 2017-01-05 NOTE — TELEPHONE ENCOUNTER
See in clinic with H/N MD on 1/11, appt made.  Plan discussion of case @ ENT / multidisciplinary  tumor conference on 1/6 for review of imaging and planning best approach to biopsy of neck nodes clinic/IR vs OR.  Dr Sidhu requests  timely evaluation and procedure, given patient status and symptoms.

## 2017-01-06 ENCOUNTER — TEAM CONFERENCE (OUTPATIENT)
Dept: OTOLARYNGOLOGY | Facility: CLINIC | Age: 37
End: 2017-01-06
Attending: OTOLARYNGOLOGY

## 2017-01-06 DIAGNOSIS — Z94.4 LIVER REPLACED BY TRANSPLANT (H): ICD-10-CM

## 2017-01-06 DIAGNOSIS — R59.1 LYMPHADENOPATHY: Primary | ICD-10-CM

## 2017-01-06 LAB
DONOR IDENTIFICATION: NORMAL
DSA COMMENTS: NORMAL
DSA PRESENT: NO
DSA TEST METHOD: NORMAL
EBV DNA # SPEC NAA+PROBE: 3116 {COPIES}/ML
EBV DNA SPEC NAA+PROBE-LOG#: 3.5 {LOG_COPIES}/ML
ERYTHROCYTE [DISTWIDTH] IN BLOOD BY AUTOMATED COUNT: 12.8 % (ref 10–15)
HCT VFR BLD AUTO: 43.4 % (ref 40–53)
HGB BLD-MCNC: 14.3 G/DL (ref 13.3–17.7)
LDH SERPL L TO P-CCNC: 257 U/L (ref 85–227)
MCH RBC QN AUTO: 29.4 PG (ref 26.5–33)
MCHC RBC AUTO-ENTMCNC: 32.9 G/DL (ref 31.5–36.5)
MCV RBC AUTO: 89 FL (ref 78–100)
ORGAN: NORMAL
PLATELET # BLD AUTO: 88 10E9/L (ref 150–450)
RBC # BLD AUTO: 4.87 10E12/L (ref 4.4–5.9)
URATE SERPL-MCNC: 5.6 MG/DL (ref 3.5–7.2)
WBC # BLD AUTO: 3.9 10E9/L (ref 4–11)

## 2017-01-06 PROCEDURE — 36415 COLL VENOUS BLD VENIPUNCTURE: CPT | Performed by: INTERNAL MEDICINE

## 2017-01-06 PROCEDURE — 83615 LACTATE (LD) (LDH) ENZYME: CPT | Performed by: INTERNAL MEDICINE

## 2017-01-06 PROCEDURE — 85027 COMPLETE CBC AUTOMATED: CPT | Performed by: INTERNAL MEDICINE

## 2017-01-06 PROCEDURE — 84550 ASSAY OF BLOOD/URIC ACID: CPT | Performed by: INTERNAL MEDICINE

## 2017-01-06 NOTE — TELEPHONE ENCOUNTER
Follow up: Left message with patient regarding plan of care. Will schedule US-guided FNA of right neck node for further assessment of pathology of concerning node. Dr. Soto would like full work up with FNA to be completed prior to appointment in order to provide complete information best manage. Our schedulers will call to schedule FNA and follow up with Dr. Soto 1/18/17. Contact information left on message for any further questions or concerns.    Lorenza Oneill RN, BSN.  1/6/2017 1:52 PM

## 2017-01-06 NOTE — TELEPHONE ENCOUNTER
"Head & Neck Tumor Conference Note  01/05/2017    Status: New  Staff: Dr. Soto    Tumor Site: Cervical lymphadenopathy, unknown primary site  Tumor Stage: pending    Brief History:   is a 36 year old male with ESKD from [facility] and ESLD secondary to alcoholic cirrhosis, status post DDKT and liver transplant on 5/11/16. He noticed a large \"lymph node\" on the right neck about 2 weeks ago, also reports some night sweat, otherwise asymptomatic. A CT was obtained and showed a large necrotic mass.    Reason for Review:   Review image, discuss POC and best way to biopsy    Imaging:  Findings:    Evaluation of the mucosal space demonstrates no evident abnormality in  the nasopharynx, hypopharynx or the glottis. Prominent bilateral  palatine tonsil. The tongue base appears normal. The major salivary  glands appear unremarkable. The thyroid gland appears normal.     There is heterogeneous, soft tissue density, peripherally enhancing  lesion with central hypodense area likely representing conglomerate  lymph node at right level 2A/3 measuring 3 x 2.8 cm. The kenney mass is  seen compressing the right IJV. Multiple lymph nodes are seen in  bilateral  level 1B, left level 2A and B. The fascial spaces in the  neck are intact bilaterally. The major vascular structures in the neck  appear unremarkable.     Evaluation of the osseous structures demonstrate no worrisome lytic or  sclerotic lesion. No overt spinal canal or neuroforaminal stenosis.  The visualized paranasal sinuses are clear. The mastoid air cells are  clear.       The visualized lung apices are clear.                                                                       Impression:     1. Conglomerate, necrotic lymph mass in the right level 2A/3,  differential includes metastatic nodes from unknown primary,  posttransplant lymphoproliferative disorder versus infection.    2. Multiple, discrete, bilateral cervical lymph node  3.Prominent palatine " tonsils    Pathology:  None    Tumor Board Recommendations:  Patient with advanced comorbidities, now with cervical adenopathy in neck.  Node is large and necrotic in right level II, and differential includes lymphoma vs metastatic p16 disease vs infection.  Because of drastically different treatments, board recommended ultrasound guided FNA first to determine nature of disease prior to ENT visit.

## 2017-01-10 ENCOUNTER — TELEPHONE (OUTPATIENT)
Dept: INTERVENTIONAL RADIOLOGY/VASCULAR | Facility: CLINIC | Age: 37
End: 2017-01-10

## 2017-01-11 ENCOUNTER — APPOINTMENT (OUTPATIENT)
Dept: MEDSURG UNIT | Facility: CLINIC | Age: 37
End: 2017-01-11
Attending: OTOLARYNGOLOGY
Payer: MEDICARE

## 2017-01-11 ENCOUNTER — APPOINTMENT (OUTPATIENT)
Dept: INTERVENTIONAL RADIOLOGY/VASCULAR | Facility: CLINIC | Age: 37
End: 2017-01-11
Attending: OTOLARYNGOLOGY
Payer: MEDICARE

## 2017-01-11 ENCOUNTER — HOSPITAL ENCOUNTER (OUTPATIENT)
Facility: CLINIC | Age: 37
Discharge: HOME | End: 2017-01-11
Attending: OTOLARYNGOLOGY | Admitting: OTOLARYNGOLOGY
Payer: MEDICARE

## 2017-01-11 VITALS
DIASTOLIC BLOOD PRESSURE: 59 MMHG | TEMPERATURE: 98.1 F | SYSTOLIC BLOOD PRESSURE: 106 MMHG | HEART RATE: 65 BPM | OXYGEN SATURATION: 100 % | RESPIRATION RATE: 14 BRPM

## 2017-01-11 DIAGNOSIS — R59.1 LYMPHADENOPATHY: Primary | ICD-10-CM

## 2017-01-11 DIAGNOSIS — R59.1 LYMPHADENOPATHY: ICD-10-CM

## 2017-01-11 LAB
INR PPP: 1.14 (ref 0.86–1.14)
PLATELET # BLD AUTO: 96 10E9/L (ref 150–450)

## 2017-01-11 PROCEDURE — 40000166 ZZH STATISTIC PP CARE STAGE 1

## 2017-01-11 PROCEDURE — 00000155 ZZHCL STATISTIC H-CELL BLOCK W/STAIN: Performed by: OTOLARYNGOLOGY

## 2017-01-11 PROCEDURE — 88333 PATH CONSLTJ SURG CYTO XM 1: CPT | Performed by: OTOLARYNGOLOGY

## 2017-01-11 PROCEDURE — 27210995 ZZH RX 272: Performed by: RADIOLOGY

## 2017-01-11 PROCEDURE — 85610 PROTHROMBIN TIME: CPT | Performed by: RADIOLOGY

## 2017-01-11 PROCEDURE — 76942 ECHO GUIDE FOR BIOPSY: CPT

## 2017-01-11 PROCEDURE — 88185 FLOWCYTOMETRY/TC ADD-ON: CPT | Performed by: OTOLARYNGOLOGY

## 2017-01-11 PROCEDURE — 27210903 ZZH KIT CR5

## 2017-01-11 PROCEDURE — 88305 TISSUE EXAM BY PATHOLOGIST: CPT | Performed by: OTOLARYNGOLOGY

## 2017-01-11 PROCEDURE — 85049 AUTOMATED PLATELET COUNT: CPT | Performed by: RADIOLOGY

## 2017-01-11 PROCEDURE — 25000128 H RX IP 250 OP 636: Performed by: PHYSICIAN ASSISTANT

## 2017-01-11 PROCEDURE — 88173 CYTOPATH EVAL FNA REPORT: CPT | Performed by: OTOLARYNGOLOGY

## 2017-01-11 PROCEDURE — 27210732 ZZH ACCESSORY CR1

## 2017-01-11 PROCEDURE — 88172 CYTP DX EVAL FNA 1ST EA SITE: CPT | Performed by: OTOLARYNGOLOGY

## 2017-01-11 PROCEDURE — 27210909 ZZH NEEDLE CR5

## 2017-01-11 PROCEDURE — 40000556 ZZH STATISTIC PERIPHERAL IV START W US GUIDANCE

## 2017-01-11 PROCEDURE — 25000125 ZZHC RX 250: Performed by: RADIOLOGY

## 2017-01-11 PROCEDURE — 88184 FLOWCYTOMETRY/ TC 1 MARKER: CPT | Performed by: OTOLARYNGOLOGY

## 2017-01-11 RX ORDER — SODIUM CHLORIDE 9 MG/ML
INJECTION, SOLUTION INTRAVENOUS CONTINUOUS
Status: DISCONTINUED | OUTPATIENT
Start: 2017-01-11 | End: 2017-01-11 | Stop reason: HOSPADM

## 2017-01-11 RX ORDER — FENTANYL CITRATE 50 UG/ML
25-50 INJECTION, SOLUTION INTRAMUSCULAR; INTRAVENOUS EVERY 5 MIN PRN
Status: DISCONTINUED | OUTPATIENT
Start: 2017-01-11 | End: 2017-01-11 | Stop reason: HOSPADM

## 2017-01-11 RX ORDER — FLUMAZENIL 0.1 MG/ML
0.2 INJECTION, SOLUTION INTRAVENOUS
Status: DISCONTINUED | OUTPATIENT
Start: 2017-01-11 | End: 2017-01-11 | Stop reason: HOSPADM

## 2017-01-11 RX ORDER — NALOXONE HYDROCHLORIDE 0.4 MG/ML
.1-.4 INJECTION, SOLUTION INTRAMUSCULAR; INTRAVENOUS; SUBCUTANEOUS
Status: DISCONTINUED | OUTPATIENT
Start: 2017-01-11 | End: 2017-01-11 | Stop reason: HOSPADM

## 2017-01-11 RX ADMIN — MIDAZOLAM 2 MG: 1 INJECTION INTRAMUSCULAR; INTRAVENOUS at 13:57

## 2017-01-11 RX ADMIN — LIDOCAINE HYDROCHLORIDE 7 ML: 10 INJECTION, SOLUTION EPIDURAL; INFILTRATION; INTRACAUDAL; PERINEURAL at 13:58

## 2017-01-11 RX ADMIN — SODIUM CHLORIDE: 9 INJECTION, SOLUTION INTRAVENOUS at 09:50

## 2017-01-11 RX ADMIN — FENTANYL CITRATE 100 MCG: 50 INJECTION, SOLUTION INTRAMUSCULAR; INTRAVENOUS at 13:57

## 2017-01-11 NOTE — PROGRESS NOTES
1045 Pt on 2a prepped and ready for lymph node biopsy. PIV placed by vascular access using U/S. Labs sent. H&P current. Pt's parents will pick pt up after procedure.

## 2017-01-11 NOTE — PROGRESS NOTES
[facility] Procedure Note        Sedation:      Performed by: Marcelino Massey  Authorized by: Marcelino Massey    Pre-Procedure Assessment done at 1030.    Expected Level:  Moderate Sedation    Indication:  Sedation is required to allow for Right neck FNA     Consent obtained from patient after discussing the risks, benefits and alternatives.    PO Intake:  Appropriately NPO for procedure    ASA Class:  Class 2 - MILD SYSTEMIC DISEASE, NO ACUTE PROBLEMS, NO FUNCTIONAL LIMITATIONS.    Mallampati:  Grade 2:  Soft palate, base of uvula, tonsillar pillars, and portion of posterior pharyngeal wall visible    Lungs: Lungs Clear with good breath sounds bilaterally.     Heart: Normal heart sounds and rate    History and physical reviewed and no updates needed.  Patient reports no pain over the swollen region at the right angle of mandibulae region where the necrotic lymph node seen on CT on 1/3/2017.   S/p Kidney and liver transplant which now presents with this lymph node which is first noticed about 2 weeks ago. No SOB, chest pain, abdominal pain, dysuria, frequency, frequent diarrhea. He does report some bouts of night sweats and also approximately 2 pound weight loss.      I have reviewed the lab findings, diagnostic data, medications, and the plan for sedation. I have determined this patient to be an appropriate candidate for the planned sedation and procedure and have reassessed the patient IMMEDIATELY PRIOR to sedation and procedure.

## 2017-01-11 NOTE — PROGRESS NOTES
1400 Pt arrived on 2a post lymph node biopsy. VSS RA. Dressing c/d/i. Pt exp mild pain at site. Ice pack applied to neck. Pt sipping on pop, declines food at this time. 1500 Pt henrry po intake. Dc instructions reviewed with pt, copy given to pt. Pt up amb in cristobal, henrry well. Voided. PIV dc'd. 1520 Pt dc'd home acc by parents.

## 2017-01-11 NOTE — IP AVS SNAPSHOT
[address] [id number]                                       After Visit Summary   1/11/2017    Cricket Chen    MRN: [id number]           After Visit Summary Signature Page     I have received my discharge instructions, and my questions have been answered. I have discussed any challenges I see with this plan with the nurse or doctor.    ..........................................................................................................................................  Patient/Patient Representative Signature      ..........................................................................................................................................  Patient Representative Print Name and Relationship to Patient    ..................................................               ................................................  Date                                            Time    ..........................................................................................................................................  Reviewed by Signature/Title    ...................................................              ..............................................  Date                                                            Time

## 2017-01-11 NOTE — DISCHARGE INSTRUCTIONS
[facility]    Interventional Radiology  Patient Instructions Following Neck Biopsy    AFTER YOU GO HOME  ? If you were given sedation DO NOT drive or operate machinery at home or at work for at least 24 hours  ? DO relax and take it easy for 48 hours, no strenuous activity for 24 hours  ? DO drink plenty of fluids  ? DO resume your regular diet, unless otherwise instructed by your Primary Physician  ? Keep the dressing dry and in place for 24 hours.  ? DO NOT SMOKE FOR AT LEAST 24 HOURS, if you have been given any medications that were to help you relax or sedate you during your procedure  ? DO NOT drink alcoholic beverages the day of your procedure  ? DO NOT do any strenuous exercise or lifting (> 10 lbs) for at least 3 days following your procedure  ? DO NOT take a bath or shower for at least 12 hours following your procedure  ? Remove dressing after shower the next day. Replace with Band aid for 2 days.  Never leave a wet dressing in place.  ? DO NOT make any important or legal decisions for 24 hours following your procedure  ? There should be minimum drainage from the biopsy site    CALL THE PHYSICIAN IF:  ? You start bleeding from the procedure site.  If you do start to bleed from that site, hold pressure on the site for a minimum of 10 minutes.  Your physician will tell you if you need to return to the hospital  ? You develop nausea or vomiting  ? You have excessive swelling, redness, or tenderness at the site  ? You have drainage that looks like it is infected.  ? You experience severe pain  ? You develop hives or a rash or unexplained itching  ? You develop shortness of breath  ? You develop a temperature of 101 degrees F or greater    ADDITIONAL INSTRUCTIONS: None    [facility] INTERVENTIONAL RADIOLOGY DEPARTMENT  Procedure Physician:         Dr. Tristan             Date of procedure: January 11, 2017  Telephone Numbers: [phone] Monday-Friday 8:00 am to 4:30 pm  [phone] After 4:30 pm  Monday-Friday, Weekends & Holidays.   Ask for the Interventional Radiologist on call.  Someone is on call 24 hrs/day  [facility] toll free number: [phone] Monday-Friday 8:00 am to 4:30 pm  [facility] Emergency Dept: [phone]

## 2017-01-11 NOTE — IP AVS SNAPSHOT
MRN:[id number]                      After Visit Summary   1/11/2017    Cricket Chen    MRN: [id number]           Visit Information        Department      1/11/2017  9:15 AM [address] [facility]          Review of your medicines      UNREVIEWED medicines. Ask your doctor about these medicines        Dose / Directions    amLODIPine 10 MG tablet   Commonly known as:  NORVASC   Used for:  HTN (hypertension)        Dose:  10 mg   Take 1 tablet (10 mg) by mouth daily   Quantity:  90 tablet   Refills:  3       lisinopril 5 MG tablet   Commonly known as:  PRINIVIL/ZESTRIL   Used for:  HTN (hypertension)        Dose:  5 mg   Take 1 tablet (5 mg) by mouth daily   Quantity:  90 tablet   Refills:  3       magnesium oxide 400 (241.3 MG) MG tablet   Commonly known as:  MAG-OX   Used for:  Hypomagnesemia        Dose:  800 mg   Take 2 tablets (800 mg) by mouth 2 times daily   Quantity:  120 tablet   Refills:  3       mycophenolate 250 MG capsule   Commonly known as:  CELLCEPT - GENERIC EQUIVALENT   Used for:  Liver replaced by transplant (H)        Dose:  750 mg   Take 3 capsules (750 mg) by mouth 2 times daily   Quantity:  180 capsule   Refills:  11       pantoprazole 20 MG EC tablet   Commonly known as:  PROTONIX   Used for:  End-stage liver disease (H)        Dose:  20 mg   Take 1 tablet (20 mg) by mouth daily   Quantity:  90 tablet   Refills:  3       sulfamethoxazole-trimethoprim 400-80 MG per tablet   Commonly known as:  BACTRIM/SEPTRA   Indication:  PCP Prophylaxis   Used for:  Liver replaced by transplant (H), S/P kidney transplant        Dose:  1 tablet   Take 1 tablet by mouth daily   Quantity:  30 tablet   Refills:  11       tacrolimus capsule   Used for:  Liver replaced by transplant (H), S/P kidney transplant        4 mg AM and 3 mg PM   Quantity:  210 capsule   Refills:  11       traMADol 50 MG tablet   Commonly known as:  ULTRAM   Used for:  Chronic pain of left knee        Dose:  50 mg   Take  1 tablet (50 mg) by mouth every 8 hours as needed for moderate pain   Quantity:  45 tablet   Refills:  0       vitamin D 2000 UNITS tablet   Used for:  Vitamin D deficiency        Dose:  2000 Units   Take 2,000 Units by mouth daily   Quantity:  100 tablet   Refills:  3                Protect others around you: Learn how to safely use, store and throw away your medicines at www.disposemymeds.org.         Follow-ups after your visit        Your next 10 appointments already scheduled     Jan 18, 2017  8:40 AM   (Arrive by 8:25 AM)   New Patient Visit with Beka Soto MD   [facility] ([facility])    [address] [place] [address]  [facility] [id number] [phone]            Jan 23, 2017 10:45 AM   Lab visit with  LAB   [facility] ([facility])    [address] [place] [id number] [phone]           Please do not eat 10-12 hours before your appointment if you are coming in fasting for labs on lipids, cholesterol, or glucose (sugar). Does not apply to pregnant women.  Water with medications is okay. Do not drink coffee or other fluids.  If you have concerns about taking  your medications, please send a message by clicking on Secure Messaging, Message Your Care Team.            Apr 04, 2017 12:10 PM   (Arrive by 11:55 AM)   Return General Liver with Laureen Galvan MD   [facility] ([facility])    [address] [place] [id number] [phone]            Apr 18, 2017 10:30 AM   (Arrive by 10:00 AM)   Return Kidney Transplant with Jake Sidhu MD   [facility] Nephrology ([facility])    [address] [place] [id number] [phone]               Care Instructions        Further instructions from your care team       [facility]    Interventional Radiology  Patient Instructions  Following Neck Biopsy    AFTER YOU GO HOME  ? If you were given sedation DO NOT drive or operate machinery at home or at work for at least 24 hours  ? DO relax and take it easy for 48 hours, no strenuous activity for 24 hours  ? DO drink plenty of fluids  ? DO resume your regular diet, unless otherwise instructed by your Primary Physician  ? Keep the dressing dry and in place for 24 hours.  ? DO NOT SMOKE FOR AT LEAST 24 HOURS, if you have been given any medications that were to help you relax or sedate you during your procedure  ? DO NOT drink alcoholic beverages the day of your procedure  ? DO NOT do any strenuous exercise or lifting (> 10 lbs) for at least 3 days following your procedure  ? DO NOT take a bath or shower for at least 12 hours following your procedure  ? Remove dressing after shower the next day. Replace with Band aid for 2 days.  Never leave a wet dressing in place.  ? DO NOT make any important or legal decisions for 24 hours following your procedure  ? There should be minimum drainage from the biopsy site    CALL THE PHYSICIAN IF:  ? You start bleeding from the procedure site.  If you do start to bleed from that site, hold pressure on the site for a minimum of 10 minutes.  Your physician will tell you if you need to return to the hospital  ? You develop nausea or vomiting  ? You have excessive swelling, redness, or tenderness at the site  ? You have drainage that looks like it is infected.  ? You experience severe pain  ? You develop hives or a rash or unexplained itching  ? You develop shortness of breath  ? You develop a temperature of 101 degrees F or greater    ADDITIONAL INSTRUCTIONS: None    [facility] INTERVENTIONAL RADIOLOGY DEPARTMENT  Procedure Physician:         Dr. Tristan             Date of procedure: January 11, 2017  Telephone Numbers: [phone] Monday-Friday 8:00 am to 4:30 pm  [phone] After 4:30 pm Monday-Friday, Weekends & Holidays.   Ask for the Interventional Radiologist on  call.  Someone is on call 24 hrs/day  [facility] toll free number: [phone] Monday-Friday 8:00 am to 4:30 pm  [facility] Emergency Dept: [phone]           Additional Information About Your Visit        MyChart Information     [facility] [employer] gives you secure access to your electronic health record. If you see a primary care provider, you can also send messages to your care team and make appointments. If you have questions, please call your primary care clinic.  If you do not have a primary care provider, please call [phone] and they will assist you.        Care EveryWhere ID     This is your Care EveryWhere ID. This could be used by other organizations to access your [facility] medical records  [id number]        Your Vitals Were     Blood Pressure Pulse Temperature Respirations Pulse Oximetry       124/67 mmHg 65 98.1  F (36.7  C) (Oral) 14 96%        Primary Care Provider Office Phone # Fax #    Eugenia Perez [phone]      Thank you!     Thank you for choosing [facility] for your care. Our goal is always to provide you with excellent care. Hearing back from our patients is one way we can continue to improve our services. Please take a few minutes to complete the written survey that you may receive in the mail after you visit with us. Thank you!             Medication List: This is a list of all your medications and when to take them. Check marks below indicate your daily home schedule. Keep this list as a reference.      Medications           Morning Afternoon Evening Bedtime As Needed    amLODIPine 10 MG tablet   Commonly known as:  NORVASC   Take 1 tablet (10 mg) by mouth daily                                lisinopril 5 MG tablet   Commonly known as:  PRINIVIL/ZESTRIL   Take 1 tablet (5 mg) by mouth daily                                magnesium oxide 400 (241.3 MG) MG tablet   Commonly known as:  MAG-OX   Take 2 tablets (800 mg) by mouth 2 times daily                                 mycophenolate 250 MG capsule   Commonly known as:  CELLCEPT - GENERIC EQUIVALENT   Take 3 capsules (750 mg) by mouth 2 times daily                                pantoprazole 20 MG EC tablet   Commonly known as:  PROTONIX   Take 1 tablet (20 mg) by mouth daily                                sulfamethoxazole-trimethoprim 400-80 MG per tablet   Commonly known as:  BACTRIM/SEPTRA   Take 1 tablet by mouth daily                                tacrolimus capsule   4 mg AM and 3 mg PM                                traMADol 50 MG tablet   Commonly known as:  ULTRAM   Take 1 tablet (50 mg) by mouth every 8 hours as needed for moderate pain                                vitamin D 2000 UNITS tablet   Take 2,000 Units by mouth daily

## 2017-01-11 NOTE — PROGRESS NOTES
Interventional Radiology Intra-procedural Nursing Note    Patient Name: Cricket Chen  Medical Record Number: [id number]  Today's Date: January 11, 2017    Start Time: 1300  End of procedure time: 1355  Procedure:  Right neck nodule biopsy using fine needle aspiration.    Consent obtained/ fire safety performed by: Dr. Tristan  Procedure performed by: Dr. Carter    Report given to: Krystina  Time pt departs:  1400      Other Notes: alert male trasnported via cart from  for planned procedure in IR Procedure Room 3.  ID band confirmed and patient acknowledges understanding of planned procedure.  Patient remains on cart, positioned supine.    Right neck lymph node identified via image guidance.  Provider able to obtain samples via fine needle aspiration.  Specimen provided to Pathology Staff on site.      Post- procedure site is cleansed and dressed per protocol.  Patient denies pain and is returned to  via cart for continued monitoring and discharge.    [id number] and Versed 3 mgs administered in total.  ST= 55 min    Kaylan Wiseman RN

## 2017-01-12 LAB — COPATH REPORT: NORMAL

## 2017-01-13 LAB — COPATH REPORT: NORMAL

## 2017-01-14 NOTE — PROGRESS NOTES
January 18, 2017      Dear Dr. Sidhu:    I had the pleasure of meeting Cricket Chen in consultation today at the [facility] at your request.     History of Present Illness:   Mr. Chen is a 36-year-old man who is here for evaluation of a right neck mass.      The pertinent history is that he developed liver and kidney failure secondary to years of prior alcohol abuse.  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  He has been clean and sober since that time and feeling very, very well.      Unfortunately, around [facility], the patient noticed that he had some swelling in the right side of the neck.  He could not feel anything and it did not hurt, but he went to see his primary care doctor who treated him conservatively with no improvement and referred him for evaluation.  Because he already had a visit scheduled with Dr. Sidhu for surveillance of his transplant, Dr. Sidhu began the workup and ordered a CT scan of the neck.  This was done on January 3 which revealed matted necrotic nodes in levels II and III of the right neck.      Dr. Sidhu then arranged a fine needle aspiration biopsy which was performed on January 11.  The fine needle aspiration biopsy was negative and therefore, a core needle biopsy was pursued.  This showed only polymorphic lymph cells and no evidence of cancer.      Since that time, Mr. Chen has not noticed any change.  He tells me that the mass is completely asymptomatic, except for when he chews.  Then, sometimes the jaw will hurt a little bit.  There is no other pain.  There is no bleeding.  There has been no difficulty with swallowing, chewing or breathing.  His voice is normal as well.  He does note some night sweats, but they are not drenching.  He says he has had two night sweats in the last couple of weeks.  There is no itching or fevers.      Upon questioning, he does recall a visit to see Dr. Curtis Rock here  in the Otolaryngology Clinic in September at which time he was diagnosed with tonsillitis.  This was treated and immediately improved with Augmentin.         MEDICATIONS:     Current Outpatient Prescriptions   Medication Sig Dispense Refill     amoxicillin-clavulanate (AUGMENTIN) 875-125 MG per tablet Take 1 tablet by mouth 2 times daily 20 tablet 0     Cholecalciferol (VITAMIN D) 2000 UNITS tablet Take 2,000 Units by mouth daily 100 tablet 3     traMADol (ULTRAM) 50 MG tablet Take 1 tablet (50 mg) by mouth every 8 hours as needed for moderate pain 45 tablet 0     magnesium oxide (MAG-OX) 400 (241.3 MG) MG tablet Take 2 tablets (800 mg) by mouth 2 times daily 120 tablet 3     PROGRAF 1 MG PO CAPSULE 4 mg AM and 3 mg  capsule 11     amLODIPine (NORVASC) 10 MG tablet Take 1 tablet (10 mg) by mouth daily 90 tablet 3     lisinopril (PRINIVIL,ZESTRIL) 5 MG tablet Take 1 tablet (5 mg) by mouth daily 90 tablet 3     mycophenolate (CELLCEPT - GENERIC EQUIVALENT) 250 MG capsule Take 3 capsules (750 mg) by mouth 2 times daily 180 capsule 11     sulfamethoxazole-trimethoprim (BACTRIM,SEPTRA) 400-80 MG per tablet Take 1 tablet by mouth daily 30 tablet 11     pantoprazole (PROTONIX) 20 MG tablet Take 1 tablet (20 mg) by mouth daily 90 tablet 3       ALLERGIES:  No Known Allergies    HABITS/SOCIAL HISTORY:    Mr. Chen formerly abused alcohol but has been clean and sober since well before the transplant.  He smoked a little in high school but has not smoked since that time.  He used to work as a  for his dad's company but has not returned to work just yet.  He is planning to return in the spring.  He is not .         PAST MEDICAL HISTORY:   Past Medical History   Diagnosis Date     Alcohol abuse      Last drink in Mid-April 2014     Cirrhosis (H)      S/P liver transplant     Hypertension      Atrial flutter (H)      Depression      Varices, esophageal (H)      History of transposition of great  vessels      atrial switch at age 8 months old     Anemia in ESRD (end-stage renal disease) (H)      Renal transplant recipient      2016     Liver transplant recipient (H)      2016     Anxiety 2008     Pneumonia 11-15-14        FAMILY HISTORY:    Family History   Problem Relation Age of Onset     Hypertension Brother      Hypertension Sister      Alcohol/Drug No family hx of      GASTROINTESTINAL DISEASE No family hx of      no fam hx of liver disease or liver cancer     Arthritis Father      Hypertension Mother      Hypertension Father      Hypertension Sister      Hypertension Brother        REVIEW OF SYSTEMS:    A 10 point Review of Systems was performed and pertinent positives are noted in the HPI; remaining positives are:  Patient Supplied Answers to Review of Systems   ENT ROS 1/13/2017   Constitutional Weight loss, Problems with sleep, Unexplained fever or night sweats   Ears, Nose, Throat -   Cardiopulmonary Cough   Hematologic Lymph node swelling       PHYSICAL EXAMINATION:    Constitutional:  The patient was well-groomed and in no acute distress.    Skin:  Warm and pink.   Neurologic:  Alert and oriented x3. Cranial nerves III-XII within normal limits. Voice quality is normal.     Psychiatric:  The patient's affect was calm, cooperative, and appropriate.  The patient was very friendly and easy to talk to.   Respiratory: Breathing comfortably without stridor or exertion of accessory muscles.   Eyes:  Pupils were equal and reactive. Extraocular movements are intact.   Head:  Normocephalic and atraumatic. No lesions or scars.    Ears:  External auditory canals and tympanic membranes were clear.    Nose:  Sinuses were nontender. The septum was deviated to the right side.  The left side was widely open.   Oral cavity/Oropharynx:  Normal tongue, floor of mouth, buccal mucosa, and palate. No lesions or masses on inspection or palpation. No abnormal lymph tissue in the oropharynx.   He has 1+ tonsils  bilaterally.  They are rubbery and not indurated.  The base of tongue is soft as well.   Hypopharynx/Larynx:  Deferred.   Neck:   There is prominent swelling in the right level II area.  There is a palpable compressible mass underneath the anterior border of the SCM that is probably about 1.5 x 4 cm.  It is oblong.  The rest of the neck is negative.   Lymphatic:  There is no palpable lymphadenopathy or other masses in the neck or parotid.       FIBEROPTIC ENDOSCOPY:  Due to the need to evaluate the lymphadenopathy, fiberoptic laryngoscopy was indicated. The nose was topically decongested and anesthetized. The fiberoptic laryngoscope was passed under endoscopic vision. The turbinates were normal.  The inferior and middle meati were clear bilaterally without purulence, masses, or polyps.  The nasopharynx was clear.  The tonsils could be seen and the base of tongue could be seen as well.  There was 1+ adenopathy throughout.  It was symmetric.  The larynx was clear.        IMPRESSION AND PLAN:  Mr. Chen is a 36-year-old gentleman status post 2016 liver and kidney transplant who presents with right neck adenopathy.  We are concerned about the potential for posttransplant lymphoproliferative disease, but with two negative biopsies, it is possible that this is a sequelae of tonsillitis.      We will start him on some Augmentin at this time and see whether or not this will help resolve his mass.  If it does, that would provide some insight.  In the meantime, I will communicate with Layla Galvan and Nahum to see how concerned they are.  If they are very concerned, I told him that we would consider an excisional biopsy.      We therefore talked about excisional biopsy and the potential risks of the procedure including marginal nerve and eleventh nerve injury.  He is willing to do so if his doctors feel that they are adequately concerned.  I will also need to check on his EBV status as well to make sure this is not EBV  related.  I believe that Dr. Sidhu has initiated that workup already.     Thank you very much for the opportunity to participate in the care of your patient.        ADDENDUM:  Mr April's EBV titers are highly elevated (3,116!), and I will present hs case to the tumor board.  The board can weigh in on consultation with medical oncology, or infectious disease, or both.  But with that level of titer, in this picture, I am very worried about the incipient development of post-transplant lymphoma.          Beka Soto M.D.  Otolaryngology/Head & Neck Surgery  [phone]             Antoinette Galvan MD    [facility] 687       Jake Sidhu MD    Mail Code [id number]       Eugenia Perez MD    [facility] 740

## 2017-01-18 ENCOUNTER — OFFICE VISIT (OUTPATIENT)
Dept: OTOLARYNGOLOGY | Facility: CLINIC | Age: 37
End: 2017-01-18

## 2017-01-18 VITALS — WEIGHT: 193 LBS | BODY MASS INDEX: 24.77 KG/M2 | HEIGHT: 74 IN

## 2017-01-18 DIAGNOSIS — R59.1 LYMPHADENOPATHY: ICD-10-CM

## 2017-01-18 DIAGNOSIS — J03.90 TONSILLITIS: Primary | ICD-10-CM

## 2017-01-18 ASSESSMENT — PAIN SCALES - GENERAL: PAINLEVEL: NO PAIN (0)

## 2017-01-18 NOTE — PATIENT INSTRUCTIONS
-Please fill ordered antibiotic prescription.  -After antibiotics completed, please send [email or web address].ly message with update regarding status of lymph node.

## 2017-01-18 NOTE — Clinical Note
1/18/2017       RE: Cricket Chen  [address] [facility] [id number]     Dear Colleague,    Thank you for referring your patient, Cricket Chen, to the [facility] EAR NOSE AND THROAT at [facility]. Please see a copy of my visit note below.    January 18, 2017      Dear Dr. Sidhu:    I had the pleasure of meeting Cricket Chen in consultation today at the [facility] at your request.     History of Present Illness:   Mr. Chen is a 36-year-old man who is here for evaluation of a right neck mass.      The pertinent history is that he developed liver and kidney failure secondary to years of prior alcohol abuse.  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  He has been clean and sober since that time and feeling very, very well.      Unfortunately, around [facility], the patient noticed that he had some swelling in the right side of the neck.  He could not feel anything and it did not hurt, but he went to see his primary care doctor who treated him conservatively with no improvement and referred him for evaluation.  Because he already had a visit scheduled with Dr. Sidhu for surveillance of his transplant, Dr. Sidhu began the workup and ordered a CT scan of the neck.  This was done on January 3 which revealed matted necrotic nodes in levels II and III of the right neck.      Dr. Sidhu then arranged a fine needle aspiration biopsy which was performed on January 11.  The fine needle aspiration biopsy was negative and therefore, a core needle biopsy was pursued.  This showed only polymorphic lymph cells and no evidence of cancer.      Since that time, Mr. Chen has not noticed any change.  He tells me that the mass is completely asymptomatic, except for when he chews.  Then, sometimes the jaw will hurt a little bit.  There is no other pain.  There is no bleeding.  There has been no difficulty with  swallowing, chewing or breathing.  His voice is normal as well.  He does note some night sweats, but they are not drenching.  He says he has had two night sweats in the last couple of weeks.  There is no itching or fevers.      Upon questioning, he does recall a visit to see Dr. Curtis Rock here in the Otolaryngology Clinic in September at which time he was diagnosed with tonsillitis.  This was treated and immediately improved with Augmentin.         MEDICATIONS:     Current Outpatient Prescriptions   Medication Sig Dispense Refill     amoxicillin-clavulanate (AUGMENTIN) 875-125 MG per tablet Take 1 tablet by mouth 2 times daily 20 tablet 0     Cholecalciferol (VITAMIN D) 2000 UNITS tablet Take 2,000 Units by mouth daily 100 tablet 3     traMADol (ULTRAM) 50 MG tablet Take 1 tablet (50 mg) by mouth every 8 hours as needed for moderate pain 45 tablet 0     magnesium oxide (MAG-OX) 400 (241.3 MG) MG tablet Take 2 tablets (800 mg) by mouth 2 times daily 120 tablet 3     PROGRAF 1 MG PO CAPSULE 4 mg AM and 3 mg  capsule 11     amLODIPine (NORVASC) 10 MG tablet Take 1 tablet (10 mg) by mouth daily 90 tablet 3     lisinopril (PRINIVIL,ZESTRIL) 5 MG tablet Take 1 tablet (5 mg) by mouth daily 90 tablet 3     mycophenolate (CELLCEPT - GENERIC EQUIVALENT) 250 MG capsule Take 3 capsules (750 mg) by mouth 2 times daily 180 capsule 11     sulfamethoxazole-trimethoprim (BACTRIM,SEPTRA) 400-80 MG per tablet Take 1 tablet by mouth daily 30 tablet 11     pantoprazole (PROTONIX) 20 MG tablet Take 1 tablet (20 mg) by mouth daily 90 tablet 3       ALLERGIES:  No Known Allergies    HABITS/SOCIAL HISTORY:    Mr. Chen formerly abused alcohol but has been clean and sober since well before the transplant.  He smoked a little in high school but has not smoked since that time.  He used to work as a  for his dad's company but has not returned to work just yet.  He is planning to return in the spring.  He is not  .         PAST MEDICAL HISTORY:   Past Medical History   Diagnosis Date     Alcohol abuse      Last drink in Mid-April 2014     Cirrhosis (H)      S/P liver transplant     Hypertension      Atrial flutter (H)      Depression      Varices, esophageal (H)      History of transposition of great vessels      atrial switch at age 8 months old     Anemia in ESRD (end-stage renal disease) (H)      Renal transplant recipient      2016     Liver transplant recipient (H)      2016     Anxiety 2008     Pneumonia 11-15-14        FAMILY HISTORY:    Family History   Problem Relation Age of Onset     Hypertension Brother      Hypertension Sister      Alcohol/Drug No family hx of      GASTROINTESTINAL DISEASE No family hx of      no fam hx of liver disease or liver cancer     Arthritis Father      Hypertension Mother      Hypertension Father      Hypertension Sister      Hypertension Brother        REVIEW OF SYSTEMS:    A 10 point Review of Systems was performed and pertinent positives are noted in the HPI; remaining positives are:  Patient Supplied Answers to Review of Systems  [facility] ROS 1/13/2017   Constitutional Weight loss, Problems with sleep, Unexplained fever or night sweats   Ears, Nose, Throat -   Cardiopulmonary Cough   Hematologic Lymph node swelling       PHYSICAL EXAMINATION:    Constitutional:  The patient was well-groomed and in no acute distress.    Skin:  Warm and pink.   Neurologic:  Alert and oriented x3. Cranial nerves III-XII within normal limits. Voice quality is normal.     Psychiatric:  The patient's affect was calm, cooperative, and appropriate.  The patient was very friendly and easy to talk to.   Respiratory: Breathing comfortably without stridor or exertion of accessory muscles.   Eyes:  Pupils were equal and reactive. Extraocular movements are intact.   Head:  Normocephalic and atraumatic. No lesions or scars.    Ears:  External auditory canals and tympanic membranes were clear.    Nose:   Sinuses were nontender. The septum was deviated to the right side.  The left side was widely open.   Oral cavity/Oropharynx:  Normal tongue, floor of mouth, buccal mucosa, and palate. No lesions or masses on inspection or palpation. No abnormal lymph tissue in the oropharynx.   He has 1+ tonsils bilaterally.  They are rubbery and not indurated.  The base of tongue is soft as well.   Hypopharynx/Larynx:  Deferred.   Neck:   There is prominent swelling in the right level II area.  There is a palpable compressible mass underneath the anterior border of the SCM that is probably about 1.5 x 4 cm.  It is oblong.  The rest of the neck is negative.   Lymphatic:  There is no palpable lymphadenopathy or other masses in the neck or parotid.       FIBEROPTIC ENDOSCOPY:  Due to the need to evaluate the lymphadenopathy, fiberoptic laryngoscopy was indicated. The nose was topically decongested and anesthetized. The fiberoptic laryngoscope was passed under endoscopic vision. The turbinates were normal.  The inferior and middle meati were clear bilaterally without purulence, masses, or polyps.  The nasopharynx was clear.  The tonsils could be seen and the base of tongue could be seen as well.  There was 1+ adenopathy throughout.  It was symmetric.  The larynx was clear.        IMPRESSION AND PLAN:  Mr. Chen is a 36-year-old gentleman status post 2016 liver and kidney transplant who presents with right neck adenopathy.  We are concerned about the potential for posttransplant lymphoproliferative disease, but with two negative biopsies, it is possible that this is a sequelae of tonsillitis.      We will start him on some Augmentin at this time and see whether or not this will help resolve his mass.  If it does, that would provide some insight.  In the meantime, I will communicate with Layla Galvan and Nahum to see how concerned they are.  If they are very concerned, I told him that we would consider an excisional biopsy.      We  therefore talked about excisional biopsy and the potential risks of the procedure including marginal nerve and eleventh nerve injury.  He is willing to do so if his doctors feel that they are adequately concerned.  I will also need to check on his EBV status as well to make sure this is not EBV related.  I believe that Dr. Sidhu has initiated that workup already.     Thank you very much for the opportunity to participate in the care of your patient.        ADDENDUM:  Mr Chen's EBV titers are highly elevated (3,116!), and I will present hs case to the tumor board.  The board can weigh in on consultation with medical oncology, or infectious disease, or both.  But with that level of titer, in this picture, I am very worried about the incipient development of post-transplant lymphoma.          Beka Soto M.D.  Otolaryngology/Head & Neck Surgery  [phone]             Antoinette Galvan MD    [facility] [address]       Jake Sidhu MD    Mail Code [id number]       Eugenia Perez MD    [facility] [id number]                       Again, thank you for allowing me to participate in the care of your patient.      Sincerely,    Beka Soto MD

## 2017-01-18 NOTE — NURSING NOTE
Chief Complaint   Patient presents with     Consult     neck mass     Shaina Elaine Medical Assistant

## 2017-01-19 ENCOUNTER — TELEPHONE (OUTPATIENT)
Dept: TRANSPLANT | Facility: CLINIC | Age: 37
End: 2017-01-19

## 2017-01-20 ENCOUNTER — TEAM CONFERENCE (OUTPATIENT)
Dept: OTOLARYNGOLOGY | Facility: CLINIC | Age: 37
End: 2017-01-20
Attending: OTOLARYNGOLOGY

## 2017-01-20 DIAGNOSIS — Z94.4 LIVER REPLACED BY TRANSPLANT (H): Primary | ICD-10-CM

## 2017-01-20 DIAGNOSIS — Z79.899 OTHER LONG TERM (CURRENT) DRUG THERAPY: ICD-10-CM

## 2017-01-20 NOTE — TELEPHONE ENCOUNTER
"Head & Neck Tumor Conference Note  2017    Status: New  Staff: Dr. 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 He noticed a large \"lymph node\" on the right neck, also reports some night sweat, otherwise asymptomatic. A CT was obtained and showed a large necrotic mass.    Reason for Review:   Review path result, discuss POC    Imagin/3/17 CT Neck  Findings:    Evaluation of the mucosal space demonstrates no evident abnormality in  the nasopharynx, hypopharynx or the glottis. Prominent bilateral  palatine tonsil. The tongue base appears normal. The major salivary  glands appear unremarkable. The thyroid gland appears normal.     There is heterogeneous, soft tissue density, peripherally enhancing  lesion with central hypodense area likely representing conglomerate  lymph node at right level 2A/3 measuring 3 x 2.8 cm. The kenney mass is  seen compressing the right IJV. Multiple lymph nodes are seen in  bilateral  level 1B, left level 2A and B. The fascial spaces in the  neck are intact bilaterally. The major vascular structures in the neck  appear unremarkable.     Evaluation of the osseous structures demonstrate no worrisome lytic or  sclerotic lesion. No overt spinal canal or neuroforaminal stenosis.  The visualized paranasal sinuses are clear. The mastoid air cells are  clear.       The visualized lung apices are clear.                                                                       Impression:     1. Conglomerate, necrotic lymph mass in the right level 2A/3,  differential includes metastatic nodes from unknown primary,  posttransplant lymphoproliferative disorder versus infection.    2. Multiple, discrete, bilateral cervical lymph node  3.Prominent palatine tonsils    Pathology:  17 FNA  A. Lymph " node, right neck, ultrasound guided fine needle aspiration:   - Nondiagnostic specimen.   Specimen Adequacy: Unsatisfactory for evaluation, due to:   -scant cellularity.     B. Lymph node, right neck, cytology core biopsy:   - Polymorphous lymphoid elements with admixed necrosis   - Negative for Malignancy   Specimen Adequacy: Satisfactory for evaluation.     COMMENT:   Concurrent flow cytometry performed ([id number]) reports polytypic   B-cells.     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  We will make a referral to heme onc.

## 2017-01-23 ENCOUNTER — CARE COORDINATION (OUTPATIENT)
Dept: OTOLARYNGOLOGY | Facility: CLINIC | Age: 37
End: 2017-01-23

## 2017-01-23 DIAGNOSIS — Z94.4 LIVER REPLACED BY TRANSPLANT (H): ICD-10-CM

## 2017-01-23 LAB
ALBUMIN SERPL-MCNC: 4.3 G/DL (ref 3.4–5)
ALP SERPL-CCNC: 103 U/L (ref 40–150)
ALT SERPL W P-5'-P-CCNC: 33 U/L (ref 0–70)
ANION GAP SERPL CALCULATED.3IONS-SCNC: 9 MMOL/L (ref 3–14)
AST SERPL W P-5'-P-CCNC: 24 U/L (ref 0–45)
BILIRUB DIRECT SERPL-MCNC: 0.1 MG/DL (ref 0–0.2)
BILIRUB SERPL-MCNC: 0.7 MG/DL (ref 0.2–1.3)
BUN SERPL-MCNC: 14 MG/DL (ref 7–30)
CALCIUM SERPL-MCNC: 9.4 MG/DL (ref 8.5–10.1)
CHLORIDE SERPL-SCNC: 105 MMOL/L (ref 94–109)
CO2 SERPL-SCNC: 25 MMOL/L (ref 20–32)
CREAT SERPL-MCNC: 1.16 MG/DL (ref 0.66–1.25)
ERYTHROCYTE [DISTWIDTH] IN BLOOD BY AUTOMATED COUNT: 13.4 % (ref 10–15)
GFR SERPL CREATININE-BSD FRML MDRD: 71 ML/MIN/1.7M2
GLUCOSE SERPL-MCNC: 94 MG/DL (ref 70–99)
HCT VFR BLD AUTO: 44.1 % (ref 40–53)
HGB BLD-MCNC: 14.4 G/DL (ref 13.3–17.7)
MAGNESIUM SERPL-MCNC: 1.9 MG/DL (ref 1.6–2.3)
MCH RBC QN AUTO: 29.1 PG (ref 26.5–33)
MCHC RBC AUTO-ENTMCNC: 32.7 G/DL (ref 31.5–36.5)
MCV RBC AUTO: 89 FL (ref 78–100)
PHOSPHATE SERPL-MCNC: 3.7 MG/DL (ref 2.5–4.5)
PLATELET # BLD AUTO: 91 10E9/L (ref 150–450)
POTASSIUM SERPL-SCNC: 4.2 MMOL/L (ref 3.4–5.3)
PROT SERPL-MCNC: 7.3 G/DL (ref 6.8–8.8)
RBC # BLD AUTO: 4.95 10E12/L (ref 4.4–5.9)
SODIUM SERPL-SCNC: 139 MMOL/L (ref 133–144)
WBC # BLD AUTO: 5.2 10E9/L (ref 4–11)

## 2017-01-23 PROCEDURE — 84100 ASSAY OF PHOSPHORUS: CPT | Performed by: INTERNAL MEDICINE

## 2017-01-23 PROCEDURE — 85027 COMPLETE CBC AUTOMATED: CPT | Performed by: INTERNAL MEDICINE

## 2017-01-23 PROCEDURE — 83735 ASSAY OF MAGNESIUM: CPT | Performed by: INTERNAL MEDICINE

## 2017-01-23 PROCEDURE — 80076 HEPATIC FUNCTION PANEL: CPT | Performed by: INTERNAL MEDICINE

## 2017-01-23 PROCEDURE — 80048 BASIC METABOLIC PNL TOTAL CA: CPT | Performed by: INTERNAL MEDICINE

## 2017-01-23 PROCEDURE — 36415 COLL VENOUS BLD VENIPUNCTURE: CPT | Performed by: INTERNAL MEDICINE

## 2017-01-23 PROCEDURE — 80197 ASSAY OF TACROLIMUS: CPT | Performed by: INTERNAL MEDICINE

## 2017-01-23 NOTE — PROGRESS NOTES
Called patient with tumor board discussion and recommendations for excisional biopsy of lymph node for possible PTLD. Patient scheduled 1/26/17 for excisional biopsy and will schedule patient with hem/onc soon after the biopsy is completed.    Lorenza Oneill RN, BSN.  #[phone]  1/23/2017 10:59 AM

## 2017-01-24 ENCOUNTER — ANESTHESIA EVENT (OUTPATIENT)
Dept: SURGERY | Facility: CLINIC | Age: 37
End: 2017-01-24
Payer: MEDICARE

## 2017-01-24 ENCOUNTER — OFFICE VISIT (OUTPATIENT)
Dept: SURGERY | Facility: CLINIC | Age: 37
End: 2017-01-24

## 2017-01-24 ENCOUNTER — ALLIED HEALTH/NURSE VISIT (OUTPATIENT)
Dept: SURGERY | Facility: CLINIC | Age: 37
End: 2017-01-24

## 2017-01-24 VITALS
RESPIRATION RATE: 16 BRPM | HEIGHT: 70 IN | OXYGEN SATURATION: 100 % | SYSTOLIC BLOOD PRESSURE: 123 MMHG | DIASTOLIC BLOOD PRESSURE: 80 MMHG | WEIGHT: 196.5 LBS | HEART RATE: 78 BPM | BODY MASS INDEX: 28.13 KG/M2 | TEMPERATURE: 98 F

## 2017-01-24 DIAGNOSIS — Z01.818 PREOP EXAMINATION: ICD-10-CM

## 2017-01-24 DIAGNOSIS — D84.9 IMMUNOSUPPRESSED STATUS (H): Primary | ICD-10-CM

## 2017-01-24 DIAGNOSIS — Z94.4 LIVER REPLACED BY TRANSPLANT (H): ICD-10-CM

## 2017-01-24 LAB
ABO + RH BLD: NORMAL
ABO + RH BLD: NORMAL
BLD GP AB SCN SERPL QL: NORMAL
BLOOD BANK CMNT PATIENT-IMP: NORMAL
SPECIMEN EXP DATE BLD: NORMAL
TACROLIMUS BLD-MCNC: 11 UG/L (ref 5–15)
TME LAST DOSE: NORMAL H

## 2017-01-24 ASSESSMENT — LIFESTYLE VARIABLES: TOBACCO_USE: 1

## 2017-01-24 ASSESSMENT — ENCOUNTER SYMPTOMS: DYSRHYTHMIAS: 1

## 2017-01-24 NOTE — H&P
Pre-Operative H & P     Date of Encounter: 1/24/2017  Primary Care Physician:  Eugenia Perez    CC: Necrotic lymph mass in the right neck, level 2A-3.    HPI:  Cricket Chen is a 36 year old male who presents for pre-operative H & P in preparation for Excisional Biopsy Right Neck Lymph Node (Level 2A - 3) on 1/26/17 with Dr. Beka Soto at [facility].     The patient was evaluated by Dr. Soto on 1/14/17 in regards to a right neck mass.  The patient first noted swelling in the right side of his neck around [place].  He was not able to palpate a mass, and it was not painful.  The patient does recall a previous diagnosis of tonsillitis that was effectively treated with Augmentin.  At his transplant surveillance appointment with Dr. Sidhu, a CT scan was ordered and revealed matted necrotic nodes in level 2 and 3 of the right neck.  A fine needle aspiration biopsy was negative.  Core needle biopsy showed only polymorphic lymph cells, but no evidence of cancer.  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  Arrangements are now being made for the above procedure.  After the biopsy is completed, the patient will be evaluated by Hematology/Oncology.  History is obtained from the patient and the medical record.    Past Medical History:  Past Medical History   Diagnosis Date     Alcohol abuse      Last drink in Mid-April 2014     Cirrhosis (H)      S/P liver transplant     Hypertension      Atrial flutter (H)      Depression      Varices, esophageal (H)      History of transposition of great vessels      atrial switch at age 8 months old     Anemia in ESRD (end-stage renal disease) (H)      Renal transplant recipient      2016     Liver transplant recipient (H)      2016     Anxiety  2008     Pneumonia 11-15-14     History of blood transfusion      Past Surgical History:  Past Surgical History   Procedure Laterality Date     Thoracic surgery       Transposition great arteries, repaired at 8 months     Esophagoscopy, gastroscopy, duodenoscopy (egd), combined  2014     Procedure: COMBINED ESOPHAGOSCOPY, GASTROSCOPY, DUODENOSCOPY (EGD);  Surgeon: Guillaume Bautista MD;  Location: [facility]     Picc insertion  14     PICC line placement 14; Removal 2014     Hc or cath ablation non-cardiac endovascular  ,     SVT     Create fistula arteriovenous upper extremity Right 2014     Procedure: CREATE FISTULA ARTERIOVENOUS UPPER EXTREMITY;  Surgeon: Padmaja Eaton MD;  Location: [facility]     Esophagoscopy, gastroscopy, duodenoscopy (egd), combined  14     Esophagoscopy, gastroscopy, duodenoscopy (egd), combined Left 3/12/2015     Procedure: COMBINED ESOPHAGOSCOPY, GASTROSCOPY, DUODENOSCOPY (EGD);  Surgeon: Laureen Galvan MD;  Location: [facility]     Esophagoscopy, gastroscopy, duodenoscopy (egd), combined N/A 2016     Procedure: COMBINED ESOPHAGOSCOPY, GASTROSCOPY, DUODENOSCOPY (EGD);  Surgeon: Laureen Galvan MD;  Location: [facility]     Transplant liver recipient  donor N/A 5/10/2016     Procedure: TRANSPLANT LIVER RECIPIENT  DONOR;  Surgeon: Ricky Deshpande MD;  Location: [facility]     Bench liver N/A 5/10/2016     Procedure: BENCH LIVER;  Surgeon: Ricky Deshpande MD;  Location: [facility]     Transplant kidney recipient  donor  5/10/2016     Procedure: TRANSPLANT KIDNEY RECIPIENT  DONOR;  Surgeon: Ricky Deshpande MD;  Location: [facility]     Cystoscopy, remove stent(s), combined Right 2016     Procedure: COMBINED CYSTOSCOPY, REMOVE STENT(S);  Surgeon: Ricky Deshpande MD;  Location: [facility]     Esophagoscopy, gastroscopy, duodenoscopy (egd), combined N/A 2016     Procedure: COMBINED ESOPHAGOSCOPY,  GASTROSCOPY, DUODENOSCOPY (EGD);  Surgeon: Laureen Galvan MD;  Location:  GI     Cardiac surgery  9-10-80     Ent surgery       Hx of Blood transfusions/reactions: History of transfusions, no known reactions.     Hx of abnormal bleeding or anti-platelet use: Denies.    Menstrual history: No LMP for male patient.    Steroid use in the last year: Denies.    Personal or FH of difficulty with anesthesia:  Denies.    Prior to admission medications  Current Outpatient Prescriptions   Medication Sig Dispense Refill     amoxicillin-clavulanate (AUGMENTIN) 875-125 MG per tablet Take 1 tablet by mouth 2 times daily 20 tablet 0     Cholecalciferol (VITAMIN D) 2000 UNITS tablet Take 2,000 Units by mouth daily 100 tablet 3     traMADol (ULTRAM) 50 MG tablet Take 1 tablet (50 mg) by mouth every 8 hours as needed for moderate pain 45 tablet 0     magnesium oxide (MAG-OX) 400 (241.3 MG) MG tablet Take 2 tablets (800 mg) by mouth 2 times daily 120 tablet 3     PROGRAF 1 MG PO CAPSULE 4 mg AM and 3 mg  capsule 11     amLODIPine (NORVASC) 10 MG tablet Take 1 tablet (10 mg) by mouth daily 90 tablet 3     lisinopril (PRINIVIL,ZESTRIL) 5 MG tablet Take 1 tablet (5 mg) by mouth daily 90 tablet 3     mycophenolate (CELLCEPT - GENERIC EQUIVALENT) 250 MG capsule Take 3 capsules (750 mg) by mouth 2 times daily 180 capsule 11     sulfamethoxazole-trimethoprim (BACTRIM,SEPTRA) 400-80 MG per tablet Take 1 tablet by mouth daily 30 tablet 11     Allergies  No Known Allergies    Social History  Social History     Social History     Marital Status: Single     Spouse Name: N/A     Number of Children: 0     Years of Education: N/A     Occupational History      Unemployed     Social History Main Topics     Smoking status: Former Smoker -- 0.33 packs/day for 3 years     Types: Cigarettes     Start date: 08/20/1997     Quit date: 09/21/2000     Smokeless tobacco: Former User     Alcohol Use: No      Comment: ~10 drinks per day for  "ten years, quit in April of 2014     Drug Use: No     Sexual Activity:     Partners: Female     Birth Control/ Protection: None     Other Topics Concern     Not on file     Social History Narrative    ? Blood transfusion as baby during surgery,    Professional performed tattoos     No Illicit IV or intranasal drug use.      Family History  Family History   Problem Relation Age of Onset     Hypertension Brother      Hypertension Sister      Alcohol/Drug No family hx of      GASTROINTESTINAL DISEASE No family hx of      no fam hx of liver disease or liver cancer     Arthritis Father      Hypertension Mother      Hypertension Father      Hypertension Sister      Hypertension Brother      Review of Systems  Functional status: Independent in ADL's.  >4 METS.     The complete review of systems is negative other than noted in the HPI or here.   Constitutional: Denies recent changes in weight, sleeping patterns, or fevers/chills.  Eyes: No recent vision changes.  EENT: Denies recent changes in hearing, mouth pain, or difficulty swallowing.  Notes some tenderness over the right side of his jaw with chewing.    Cardiovascular: Denies chest pain, BURNS or orthopnea, or palpitations.  Respiratory: Denies shortness of breath or significant cough.    GI: Denies nausea/vomiting or diarrhea/constipation.    : Denies dysuria.    Musculoskeletal: Denies joint pain or swelling.    Skin: Denies rashes or wounds.    Hematologic: Denies easy bruising or bleeding.    Neurologic: Denies migraines, seizures, dizziness, numbness/tingling.  Psychiatric: Denies changes in mood or affect.      /80 mmHg  Pulse 78  Temp(Src) 98  F (36.7  C) (Oral)  Resp 16  Ht 1.778 m (5' 10\")  Wt 89.132 kg (196 lb 8 oz)  BMI 28.19 kg/m2  SpO2 100%    196 lbs 8 oz  5' 10\"   Body mass index is 28.19 kg/(m^2).    Physical Exam  Constitutional: Patient awake, seated upright in a chair, in no apparent distress.  Appears stated age.  Eyes: Pupils equal, " round and reactive to light.  Extra ocular muscles intact. Sclera clear.  Conjunctiva normal.  HENT: Head normocephalic.  Oral pharynx intact with moist mucous membranes.  Dentition intact.  No thyromegaly appreciated.   Respiratory: Lung sounds clear to auscultation bilaterally.  No rales, rhonchi, or wheezing noted.    Cardiovascular: S1, S2, regular rate and rhythm.  No rubs, or gallops noted. 1/6 murmur noted.  Dialysis fistula in place over the right upper extremity.  Palpable thrill.  Radial and pedal pulses palpable, bilaterally.  Trace pedal edema noted, bilaterally.   GI: Bowel sounds present.  Abdomen rounded, soft, non-tender to light palpation.  No hepatosplenomegaly or masses palpated.  Surgical scars extending horizontally over the upper aspect and the lower aspects of the abdominal intact, healed.    Genitourinary: Exam deferred.  Lymph/Hematologic: No cervical or supraclavicular lymphadenopathy noted, with the exception of swelling over the right neck with a palpable lump approximately 4 cm x 2 cm.  No excessive bruising noted.    Skin: Color appropriate for race, warm, dry.  No rashes or wounds at anticipated surgical site.   Musculoskeletal: Full extension of the neck.  No redness, warmth, or swelling of the joints noted. Gross motor strength is normal.    Neurologic: Alert, oriented to name, place and time. Cranial nerves II-XII are grossly intact. Gait is normal.   Neuropsychiatric: Calm, cooperative. Normal affect.     Labs:  17: WBC 5.2; Hgb 14.4; Hct 44.1; Plt 91  17: Na 139; K 4.2; Cl 105; Glu 94; BUN 14; Cr 1.16; Ca 9.4  Imagin/3/15 Echocardiogram:  Conclusions:  1.  D-transposition of the great arteries with intact ventricular septum.  2.  History of atrial baffle procedure.  3.  Patent systemic and pulmonary venous baffles with no gross baffle leak identified.  4.  Dilated systemic right ventricle with globally diminished systolic function.  Visually estimated RV ejection  fraction 30-35%.  5.  Mild right AV valve insufficiency.  6.  Patent subpulmonary region with mild pulmonary valve insufficiency.  7.  Minimally increased velocity in the main pulmonary artery of 1.8 m/sec (PG 13, mean 9 mmHg)  8.  Estimated LV end diastolic pressure 2 mmHg plus CVP.  9.  Trace left AV valve insufficiency.  No aortic valve insufficiency.  10.  Qualitatively normal subpulmonary left ventricular systolic function with septal flattening.      11/5/15 Cardiac MRI:  Impression:  1.  Transposition of the great arteries (atrioventricular concordance, ventricular arterial discordance, aorta directly anterior to the pulmonary artery.)   A.  Previous interventions include atrial septectomy followed by atrial switch procedure.  2.  Systemic venous baffle.   A.  The superior portion of the systemic venous baffle is widely patent to the left atrium.   B.  The inferior vena cava portion of the systemic venous baffle is patent to the left atrium.  3.  Left ventricle.   A.  Subpulmonary ventricle.   B.  Thin walled.   C.  Normal function.  Ejection fraction 68%.  4.  Pulmonary venous baffle to the right atrium is widely patent.  5.  Right ventricle.   A.  Subsystemic.   B.  RVH.   C.  Increased right ventricular end-diastolic and end-systolic volume.   D.  Decreased systolic function with an ejection fraction of 35%.    6.  Right ventricle connects to the aorta, directly anterior to the pulmonary arteries.   A.  Usual coronary artery origins for transposition arising from the facing sinuses.  7.  Approximately 20% stroke volume difference and 25% difference between mitral versus tricuspid inflow, baffle leak versus tricuspid regurgitation.       5/2/16 EKG: Sinus rhythm with right bundle branch block, plus right ventricular hypertrophy, left posterior fascicular block.      1/3/17 CT Soft Tissue Neck:  Impression:  1. Conglomerate, necrotic lymph mass in the right level 2A/3, differential includes metastatic nodes  from unknown primary, posttransplant lymphoproliferative disorder versus infection.    2. Multiple, discrete, bilateral cervical lymph node  3.Prominent palatine tonsils    Lab results, EKG were personally reviewed by this provider.      Outside records from [facility] reviewed.    Assessment and Plan  Cricket Chen is a 36 year old male scheduled to undergo Excisional Biopsy Right Neck Lymph Node (Level 2A - 3) on 1/26/17 with Dr. Beka Soto.    He has the following specific operative considerations:   1.  History of transposition of the great vessels S/P surgical repair: Please refer to the most recent cardiac MRI and echocardiogram reports included in this note for details.  Recommend close monitoring of fluid balance throughout the perioperative period.  2.  History of renal and hepatic transplants: Recommend strict adherence to sterile techniques during invasive procedures due to the patient's immunocompromised status.    3.  HTN: Patient instructed to continue Norvasc and Lisinopril as prescribed.  (As he takes his medications mid-day, patient instructed to take them after his procedure.)  4.  Thrombocytopenia: Overall improved from previous, stable.  Due to the patient's history of transfusions, will obtain a type & screen today to identify any antibodies, in the unlikely event that blood products are needed.      Revised Cardiac Risk Index: 0.4% risk of major adverse cardiac event.  Anesthesia considerations: Refer to PAC assessment in the anesthesia records.  VTE risk: 0.5%  JUAN risk: Low  PONV risk score= 1.  (If > 2, anti-emetic intervention is recommended.)    Patient was discussed with Dr. Hollingsworth.    Lulu Soares NP  Preoperative Assessment Center  [facility]  Phone: [phone]  Fax: [phone]

## 2017-01-24 NOTE — PROGRESS NOTES
Preoperative Assessment Center medication history for January 24, 2017 is complete. See Epic admission navigator for allergy information, pharmacy, prior to admission medications and immunization status.  Operating room staff will still need to confirm medications and last dose information on day of surgery.     Medication history interview sources:  patient    Changes made to PTA medication list (reason)  Added: none  Deleted: none  Changed: none    Additional medication history information (including reliability of information, actions taken by pharmacist):  Course of Augmentin started on 1/18 for 10 day course.       Prior to Admission medications    Medication Sig Last Dose Taking? Auth Provider   amoxicillin-clavulanate (AUGMENTIN) 875-125 MG per tablet Take 1 tablet by mouth 2 times daily Taking Yes Beka Soto MD   Cholecalciferol (VITAMIN D) 2000 UNITS tablet Take 2,000 Units by mouth daily Taking Yes Jake Sidhu MD   traMADol (ULTRAM) 50 MG tablet Take 1 tablet (50 mg) by mouth every 8 hours as needed for moderate pain Taking Yes Antoinette Nelson MD   magnesium oxide (MAG-OX) 400 (241.3 MG) MG tablet Take 2 tablets (800 mg) by mouth 2 times daily Taking Yes Jake Sidhu MD   PROGRAF 1 MG PO CAPSULE 4 mg AM and 3 mg PM Taking Yes Laureen Galvan MD   amLODIPine (NORVASC) 10 MG tablet Take 1 tablet (10 mg) by mouth daily Taking Yes Jake Sidhu MD   lisinopril (PRINIVIL,ZESTRIL) 5 MG tablet Take 1 tablet (5 mg) by mouth daily Taking Yes Jake Sidhu MD   mycophenolate (CELLCEPT - GENERIC EQUIVALENT) 250 MG capsule Take 3 capsules (750 mg) by mouth 2 times daily Taking Yes Vinod Bermeo MD   sulfamethoxazole-trimethoprim (BACTRIM,SEPTRA) 400-80 MG per tablet Take 1 tablet by mouth daily Taking Yes Lorenza Alonso PA-C   pantoprazole (PROTONIX) 20 MG tablet Take 1 tablet (20 mg) by mouth daily Taking Yes Laureen Galvan  MD Kareem         Medication history completed by: Johan Barnett [facility]

## 2017-01-24 NOTE — MR AVS SNAPSHOT
After Visit Summary   2017    Cricket Chen    MRN: [id number]           Patient Information     Date Of Birth          1980        Visit Information        Provider Department      2017 1:00 PM Rn, [facility]        Care Instructions    Preparing for Your Surgery      Name:  Cricket Chen   MRN:  [id number]   :  1980   Today's Date:  2017     Arriving for surgery:  Surgery date:    Surgery time:  7:30AM  Arrival time:  5:30AM  Please come to:       [facility] [address]  [address] [place]  [address]    -   parking is available in front of the hospital from 5:15 am to 8:00 pm    -  Stop at the Information Desk in the lobby    -   Inform the information person that you are here for surgery. An escort to 3c will be provided. If you would not like an escort, please proceed to 3C on the 3rd floor. [phone]     What can I eat or drink?  -  You may have solid food or milk products until 8 hours prior to your surgery---do not have food after 11:30 at night the night before surgery   -  You may have water, apple juice or 7up/Sprite until 2 hours prior to your surgery---OK to have clear liquids until 5:30AM on the morning of surgery     Which medicines can I take? OK to bring morning meds and take them between 10:30AM and 11:00AM after surgery   -  Do NOT take these medications in the morning, the day of surgery:      -  Please take these medications the day of surgery:      How do I prepare myself?  -  Take two showers: one the night before surgery; and one the morning of surgery.         Use Scrubcare or Hibiclens to wash from neck down.  You may use your own shampoo and conditioner. No other hair products.   -  Do NOT use lotion, powder, deodorant, or antiperspirant the day of your surgery.  -  Do NOT wear any makeup, fingernail polish or jewelry.  -Do not bring your own medications to  the hospital, except for inhalers and eye drops.  -  Bring your ID and insurance card.    Questions or Concerns:  If you have questions or concerns, please call the  Preoperative Assessment Center, Monday-Friday 7AM-7PM:  [phone]          AFTER YOUR SURGERY  Breathing exercises   Breathing exercises help you recover faster. Take deep breaths and let the air out slowly. This will:     Help you wake up after surgery.    Help prevent complications like pneumonia.  Preventing complications will help you go home sooner.   We may give you a breathing device (incentive spirometer) to encourage you to breathe deeply.   Nausea and vomiting   You may feel sick to your stomach after surgery; if so, let your nurse know.    Pain control:  After surgery, you may have pain. Our goal is to help you manage your pain. Pain medicine will help you feel comfortable enough to do activities that will help you heal.  These activities may include breathing exercises, walking and physical therapy.   To help your health care team treat your pain we will ask: 1) If you have pain  2) where it is located 3) describe your pain in your words  Methods of pain control include medications given by mouth, vein or by nerve block for some surgeries.  We may give you a pain control pump that will:  1) Deliver the medicine through a tube placed in your vein  2) Control the amount of medicine you receive  3) Allow you to push a button to deliver a dose of pain medicine  Sequential Compression Device (SCD) or Pneumo Boots:  You may need to wear SCD S on your legs or feet. These are wraps connected to a machine that pumps in air and releases it. The repeated pumping helps prevent blood clots from forming.         Follow-ups after your visit        Your next 10 appointments already scheduled     Jan 24, 2017 12:40 PM   (Arrive by 12:25 PM)   PAC Anesthesia Consult with  Pac Anesthesiologist   [facility] ([facility]  [facility])    [address] [place] [address]  [address] [facility] [address]   [phone]            Jan 24, 2017  1:00 PM   (Arrive by 12:45 PM)   PAC RN ASSESSMENT with  Pac Rn   [facility] ([facility])    [address] [place] [address]  [address] [facility] [address]   [phone]            Jan 24, 2017  1:30 PM   LAB with  LAB   [facility] ([facility])    [address] [place] [address]  [address] [facility] [id number] [phone]           Patient must bring picture ID.  Patient should be prepared to give a urine specimen  Please do not eat 10-12 hours before your appointment if you are coming in fasting for labs on lipids, cholesterol, or glucose (sugar).  Pregnant women should follow their Care Team instructions. Water with medications is okay. Do not drink coffee or other fluids.   If you have concerns about taking  your medications, please ask at office or if scheduling via [employer], send a message by clicking on Secure Messaging, Message Your Care Team.            Jan 26, 2017   Procedure with Beka Soto MD   [facility] [place] Same Day Surgery (--)    [address]  [facility] [address]   [phone]            Feb 01, 2017  9:00 AM   (Arrive by 8:45 AM)   New Patient Visit with Ileana Reddy MD   [facility] ([facility])    [address] [place] [address]  [address] [facility] [address]   [phone]            Apr 04, 2017 12:10 PM   (Arrive by 11:55 AM)   Return General Liver with Laureen Galvan MD   [facility] ([facility])    [address] [place] [address]  [address] [facility] [address]   [phone]            Apr 18, 2017 10:30 AM   (Arrive by 10:00 AM)   Return Kidney Transplant with Jake Sidhu MD   [facility] ([facility])    [address] [place] [address]  [address] [phone]              Who to contact     Please call your clinic at [phone] to:    Ask questions about your health    Make or cancel appointments    Discuss your medicines    Learn about your test results    Speak to your doctor   If you have compliments or concerns about an experience at your clinic, or if you wish to file a complaint, please contact [facility] Patient Relations at [phone] or email us at [email or web address]         Additional Information About Your Visit        [employer] Information     [employer] gives you secure access to your electronic health record. If you see a primary care provider, you can also send messages to your care team and make appointments. If you have questions, please call your primary care clinic.  If you do not have a primary care provider, please call [phone] and they will assist you.      [employer] is an electronic gateway that provides easy, online access to your medical records. With [employer], you can request a clinic appointment, read your test results, renew a prescription or communicate with your care team.     To access your existing account, please contact your [facility] Clinic or call [phone] for assistance.        Care EveryWhere ID     This is your Care EveryWhere ID. This could be used by other organizations to access your [place] medical records  [id number]         Blood Pressure from Last 3 Encounters:   01/24/17 123/80   01/11/17 106/59   01/03/17 130/80    Weight from Last 3 Encounters:   01/24/17 89.132 kg (196 lb 8 oz)   01/18/17 87.544 kg (193 lb)   01/03/17 86.093 kg (189 lb 12.8 oz)              Today, you had the following     No orders found for display       Primary Care Provider Office Phone # Fax #    Eugenia Perez [phone]       [address] [address]        Thank you!     Thank you for  [facility]  for your care. Our goal is always to provide you with excellent care. Hearing back from our patients is one way we can continue to improve our services. Please take a few minutes to complete the written survey that you may receive in the mail after your visit with us. Thank you!             Your Updated Medication List - Protect others around you: Learn how to safely use, store and throw away your medicines at www.disposemymeds.org.          This list is accurate as of: 1/24/17 11:39 AM.  Always use your most recent med list.                   Brand Name Dispense Instructions for use    amLODIPine 10 MG tablet    NORVASC    90 tablet    Take 1 tablet (10 mg) by mouth daily       amoxicillin-clavulanate 875-125 MG per tablet    AUGMENTIN    20 tablet    Take 1 tablet by mouth 2 times daily       lisinopril 5 MG tablet    PRINIVIL/ZESTRIL    90 tablet    Take 1 tablet (5 mg) by mouth daily       magnesium oxide 400 (241.3 MG) MG tablet    MAG-OX    120 tablet    Take 2 tablets (800 mg) by mouth 2 times daily       mycophenolate 250 MG capsule    CELLCEPT - GENERIC EQUIVALENT    180 capsule    Take 3 capsules (750 mg) by mouth 2 times daily       sulfamethoxazole-trimethoprim 400-80 MG per tablet    BACTRIM/SEPTRA    30 tablet    Take 1 tablet by mouth daily       tacrolimus capsule     210 capsule    4 mg AM and 3 mg PM       traMADol 50 MG tablet    ULTRAM    45 tablet    Take 1 tablet (50 mg) by mouth every 8 hours as needed for moderate pain       vitamin D 2000 UNITS tablet     100 tablet    Take 2,000 Units by mouth daily

## 2017-01-24 NOTE — ANESTHESIA PREPROCEDURE EVALUATION
Anesthesia Evaluation     . Pt has had prior anesthetic. Type: General and MAC    No history of anesthetic complications     ROS/MED HX    ENT/Pulmonary:     (+)tobacco use, Past use In high school packs/day  , . .   (-) recent URI   Neurologic:  - neg neurologic ROS     Cardiovascular:     (+) hypertension-range: 120s systolic , ---. : . . . :. dysrhythmias a-flutter, . 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16    (+) Other GI/Hepatic See comments      Renal/Genitourinary:  - ROS Renal section negative       Endo:  - neg endo ROS       Psychiatric:  - neg psychiatric ROS       Infectious Disease:  - neg infectious disease ROS       Malignancy:      - no malignancy   Other:    - neg other ROS           Physical Exam  Normal systems: pulmonary and dental    Airway   Mallampati: I  TM distance: >3 FB  Neck ROM: full    Dental     Cardiovascular   Rhythm and rate: regular and normal  (+) peripheral edema and murmur     PE comment: 1/6 murmur  Trace pedal edema noted, bilaterally    Pulmonary    breath sounds clear to auscultation    Other findings: Prominent right sided lymph node near the angle of the mandible.     17: WBC 5.2; Hgb 14.4; Hct 44.1; Plt 91  17: Na 139; K 4.2; Cl 105; Glu 94; BUN 14; Cr 1.16; Ca 9.4    Imagin/3/15 Echocardiogram:  Conclusions:  1.  D-transposition of the great arteries with intact ventricular septum.  2.  History of atrial baffle  procedure.  3.  Patent systemic and pulmonary venous baffles with no gross baffle leak identified.  4.  Dilated systemic right ventricle with globally diminished systolic function.  Visually estimated RV ejection fraction 30-35%.  5.  Mild right AV valve insufficiency.  6.  Patent subpulmonary region with mild pulmonary valve insufficiency.  7.  Minimally increased velocity in the main pulmonary artery of 1.8 m/sec (PG 13, mean 9 mmHg)  8.  Estimated LV end diastolic pressure 2 mmHg plus CVP.  9.  Trace left AV valve insufficiency.  No aortic valve insufficiency.  10.  Qualitatively normal subpulmonary left ventricular systolic function with septal flattening.      11/5/15 Cardiac MRI:  Impression:  1.  Transposition of the great arteries (atrioventricular concordance, ventricular arterial discordance, aorta directly anterior to the pulmonary artery.)   A.  Previous interventions include atrial septectomy followed by atrial switch procedure.  2.  Systemic venous baffle.   A.  The superior portion of the systemic venous baffle is widely patent to the left atrium.   B.  The inferior vena cava portion of the systemic venous baffle is patent to the left atrium.  3.  Left ventricle.   A.  Subpulmonary ventricle.   B.  Thin walled.   C.  Normal function.  Ejection fraction 68%.  4.  Pulmonary venous baffle to the right atrium is widely patent.  5.  Right ventricle.   A.  Subsystemic.   B.  RVH.   C.  Increased right ventricular end-diastolic and end-systolic volume.   D.  Decreased systolic function with an ejection fraction of 35%.    6.  Right ventricle connects to the aorta, directly anterior to the pulmonary arteries.   A.  Usual coronary artery origins for transposition arising from the facing sinuses.  7.  Approximately 20% stroke volume difference and 25% difference between mitral versus tricuspid inflow, baffle leak versus tricuspid regurgitation.       5/2/16 EKG: Sinus rhythm with right bundle branch block, plus  right ventricular hypertrophy, left posterior fascicular block.      1/3/17 CT Soft Tissue Neck:  Impression:  1. Conglomerate, necrotic lymph mass in the right level 2A/3, differential includes metastatic nodes from unknown primary, posttransplant lymphoproliferative disorder versus infection.    2. Multiple, discrete, bilateral cervical lymph node  3.Prominent palatine tonsils               PAC Discussion and Assessment    ASA Classification: 3  Case is suitable for: [place]  Anesthetic techniques and relevant risks discussed: GA  Invasive monitoring and risk discussed: No  Types:   Possibility and Risk of blood transfusion discussed: Yes  NPO instructions given: NPO after midnight  Additional anesthetic preparation and risks discussed:   Needs early admission to pre-op area:   Other:     PAC Resident/NP Anesthesia Assessment:  Mr Chen is a 37yo M with a significant surgical history of Transposition of the great arteries s/p surgical repair and, in 2016, 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 He has noted an enlarged lymph node on the right side over the last few months with a CT scan showing a necrotic 2x3cm node on the right side with some compression of the RIJ. He has otherwise had no post-op complications from his significant transplant history. He has excellent functional tolerance and able to easily perform > 4 mets. His thrombocytopenia is mild and much improved from pre-transplant. The patient has not had any airway symptoms and the node is not compressing any airway structures on CT.   - will get an updated T&S for the purposes of having the ability to get blood products quicker, but we do not anticipate needing to transfuse  - cardiac testing was performed at his pediatric cardiologist's office without any acute issues.   - no other labs and testing needed.   Cliff Pulido MD  CA1/ PGY2  [id number].     Cricket Chen is a 36 year old male scheduled to undergo  Excisional Biopsy Right Neck Lymph Node (Level 2A - 3) on 1/26/17 with Dr. Beka Soto.    He has the following specific operative considerations:   1.  History of transposition of the great vessels S/P surgical repair: Please refer to the most recent cardiac MRI and echocardiogram reports included in this note for details.  Recommend close monitoring of fluid balance throughout the perioperative period.  2.  History of renal and hepatic transplants: Recommend strict adherence to sterile techniques during invasive procedures due to the patient's immunocompromised status.    3.  HTN: Patient instructed to continue Norvasc and Lisinopril as prescribed.  (As he takes his medications mid-day, patient instructed to take them after his procedure.)    Revised Cardiac Risk Index: 0.4% risk of major adverse cardiac event.  Anesthesia considerations: Refer to PAC assessment in the anesthesia records.  VTE risk: 0.5%  JUAN risk: Low  PONV risk score= 1.  (If > 2, anti-emetic intervention is recommended.)            Reviewed and Signed by PAC Mid-Level Provider/Resident  Mid-Level Provider/Resident: Lulu Soares CNP  Date: 1/24/17  Time: 1325    Attending Anesthesiologist Anesthesia Assessment:        Anesthesiologist:   Date:   Time:   Pass/Fail:   Disposition:     PAC Pharmacist Assessment:        Pharmacist:   Date:   Time:      Anesthesia Plan      History & Physical Review  History and physical reviewed and following examination; no interval change.    ASA Status:  3 .    NPO Status:  > 8 hours    Plan for General and ETT with Intravenous and Etomidate induction. Maintenance will be Inhalation and Balanced.    PONV prophylaxis:  Ondansetron (or other 5HT-3) and Dexamethasone or Solumedrol  Additional equipment: 2nd IV      Postoperative Care  Postoperative pain management:  IV analgesics and Oral pain medications.      Consents  Anesthetic plan, risks, benefits and alternatives discussed with:  Patient.  Use of blood  products discussed: No .   .          ANESTHESIA PREOP EVALUATION    Procedure: Procedure(s):  Excisional Biopsy Right Level 2A-3  - Wound Class:     HPI: Cricket Chen is a 36 year old male s/p liver transplant on immunosuppressive therapy, with history as noted above presenting for above procedure. Dialysis fistula on left arm.  Patient noted as difficult IV.  Past anesthesia history significant for easy mask and intubation with grade 1 view of vocal chords via DL with reaves 2.      PMHx/PSHx/ROS:  Past Medical History   Diagnosis Date     Alcohol abuse      Last drink in Mid-April 2014     Cirrhosis (H)      S/P liver transplant     Hypertension      Atrial flutter (H)      Depression      Varices, esophageal (H)      History of transposition of great vessels      atrial switch at age 8 months old     Anemia in ESRD (end-stage renal disease) (H)      Renal transplant recipient      2016     Liver transplant recipient (H)      2016     Anxiety 2008     Pneumonia 11-15-14     History of blood transfusion        Past Surgical History   Procedure Laterality Date     Thoracic surgery  1980     Transposition great arteries, repaired at 8 months     Esophagoscopy, gastroscopy, duodenoscopy (egd), combined  5/30/2014     Procedure: COMBINED ESOPHAGOSCOPY, GASTROSCOPY, DUODENOSCOPY (EGD);  Surgeon: Guillaume Bautista MD;  Location:  GI     Picc insertion  5/30/14     PICC line placement 5/30/14; Removal 6/9/2014     Hc or cath ablation non-cardiac endovascular  1990,2002     SVT     Create fistula arteriovenous upper extremity Right 9/16/2014     Procedure: CREATE FISTULA ARTERIOVENOUS UPPER EXTREMITY;  Surgeon: Padmaja Eaton MD;  Location:  OR     Esophagoscopy, gastroscopy, duodenoscopy (egd), combined  9/30/14     Esophagoscopy, gastroscopy, duodenoscopy (egd), combined Left 3/12/2015     Procedure: COMBINED ESOPHAGOSCOPY, GASTROSCOPY, DUODENOSCOPY (EGD);  Surgeon: Laureen Galvan MD;  Location:   GI     Esophagoscopy, gastroscopy, duodenoscopy (egd), combined N/A 2016     Procedure: COMBINED ESOPHAGOSCOPY, GASTROSCOPY, DUODENOSCOPY (EGD);  Surgeon: Laureen Galvan MD;  Location:  GI     Transplant liver recipient  donor N/A 5/10/2016     Procedure: TRANSPLANT LIVER RECIPIENT  DONOR;  Surgeon: Ricky Deshpande MD;  Location: [facility]     Bench liver N/A 5/10/2016     Procedure: BENCH LIVER;  Surgeon: Ricky Deshpande MD;  Location: [facility] OR     Transplant kidney recipient  donor  5/10/2016     Procedure: TRANSPLANT KIDNEY RECIPIENT  DONOR;  Surgeon: Ricky Deshpande MD;  Location: [facility] OR     Cystoscopy, remove stent(s), combined Right 2016     Procedure: COMBINED CYSTOSCOPY, REMOVE STENT(S);  Surgeon: Ricky Deshpande MD;  Location:  OR     Esophagoscopy, gastroscopy, duodenoscopy (egd), combined N/A 2016     Procedure: COMBINED ESOPHAGOSCOPY, GASTROSCOPY, DUODENOSCOPY (EGD);  Surgeon: Laureen Galvan MD;  Location:  GI     Cardiac surgery  [birth year]10[birth year]     Ent surgery           Past Anes Hx: No personal or family h/o anesthesia problems    Soc Hx:   Social History   Substance Use Topics     Smoking status: Former Smoker -- 0.33 packs/day for 3 years     Types: Cigarettes     Start date: 1997     Quit date: 2000     Smokeless tobacco: Former User     Alcohol Use: No      Comment: ~10 drinks per day for ten years, quit in 2014       Allergies: No Known Allergies    Meds:   No prescriptions prior to admission       Current Outpatient Prescriptions   Medication Sig Dispense Refill     amoxicillin-clavulanate (AUGMENTIN) 875-125 MG per tablet Take 1 tablet by mouth 2 times daily 20 tablet 0     Cholecalciferol (VITAMIN D) 2000 UNITS tablet Take 2,000 Units by mouth daily 100 tablet 3     traMADol (ULTRAM) 50 MG tablet Take 1 tablet (50 mg) by mouth every 8 hours as needed for moderate pain 45 tablet 0      magnesium oxide (MAG-OX) 400 (241.3 MG) MG tablet Take 2 tablets (800 mg) by mouth 2 times daily 120 tablet 3     PROGRAF 1 MG PO CAPSULE 4 mg AM and 3 mg  capsule 11     amLODIPine (NORVASC) 10 MG tablet Take 1 tablet (10 mg) by mouth daily 90 tablet 3     lisinopril (PRINIVIL,ZESTRIL) 5 MG tablet Take 1 tablet (5 mg) by mouth daily 90 tablet 3     mycophenolate (CELLCEPT - GENERIC EQUIVALENT) 250 MG capsule Take 3 capsules (750 mg) by mouth 2 times daily 180 capsule 11     sulfamethoxazole-trimethoprim (BACTRIM,SEPTRA) 400-80 MG per tablet Take 1 tablet by mouth daily 30 tablet 11       Physical Exam:  Vitals: There were no vitals taken for this visit.  BMI= There is no weight on file to calculate BMI.      Labs:  UPT: No results found for: HCGQUANT      BMP:  Recent Labs   Lab Test  01/23/17   1050   NA  139   POTASSIUM  4.2   CHLORIDE  105   CO2  25   BUN  14   CR  1.16   GLC  94   COLBY  9.4     CBC:   Recent Labs   Lab Test  01/23/17   1050   WBC  5.2   RBC  4.95   HGB  14.4   HCT  44.1   MCV  89   MCH  29.1   MCHC  32.7   RDW  13.4   PLT  91*     Coags:  Recent Labs   Lab Test  01/11/17   0955   05/12/16   1655   05/11/16   0454   INR  1.14   < >  1.50*   < >  2.17*   PTT   --    --    --    --   45*   FIBR   --    --   250   < >  181*    < > = values in this interval not displayed.       Assessment/Plan:  - ASA 3  - GETA with standard ASA monitors, IV induction, balanced anesthetic  - Pre-induction:   - Versed 1-2 mg   - PIVx2   - Consider arterial line, No central line   - Antibiotics per surgeon  - Induction:   - Mac 4 blade   - ETT 8.0   - Propofol, Rocuronium, Fentanyl, Lidocaine  - Maintenance:   - Sevoflurane   - Analgesia: Fentanyl   - Fluids: LR 1 mL/kg/hr maint, < 1L total volume  - Emergence:   - Reversal: Sugammadex vs. Neostigmine & Glycopyrrolate   - PONV prophylaxis: Adelaida Terry MD  Anesthesia Resident - [facility]    1/25/2017  8:10 PM

## 2017-01-24 NOTE — PATIENT INSTRUCTIONS
Preparing for Your Surgery      Name:  Cricket Chen   MRN:  [id number]   :  1980   Today's Date:  2017     Arriving for surgery:  Surgery date:    Surgery time:  7:30AM  Arrival time:  5:30AM  Please come to:       [facility] [address]  [address] [place]  [address]    -   parking is available in front of the hospital from 5:15 am to 8:00 pm    -  Stop at the Information Desk in the lobby    -   Inform the information person that you are here for surgery. An escort to 3c will be provided. If you would not like an escort, please proceed to 3C on the 3rd floor. [phone]     What can I eat or drink?  -  You may have solid food or milk products until 8 hours prior to your surgery---do not have food after 11:30 at night the night before surgery   -  You may have water, apple juice or 7up/Sprite until 2 hours prior to your surgery---OK to have clear liquids until 5:30AM on the morning of surgery     Which medicines can I take? OK to bring morning meds and take them between 10:30AM and 11:00AM after surgery   -  Do NOT take these medications in the morning, the day of surgery:      -  Please take these medications the day of surgery:      How do I prepare myself?  -  Take two showers: one the night before surgery; and one the morning of surgery.         Use Scrubcare or Hibiclens to wash from neck down.  You may use your own shampoo and conditioner. No other hair products.   -  Do NOT use lotion, powder, deodorant, or antiperspirant the day of your surgery.  -  Do NOT wear any makeup, fingernail polish or jewelry.  -Do not bring your own medications to the hospital, except for inhalers and eye drops.  -  Bring your ID and insurance card.    Questions or Concerns:  If you have questions or concerns, please call the  Preoperative Assessment Center, Monday-Friday 7AM-7PM:  [phone]          AFTER YOUR SURGERY  Breathing exercises   Breathing  exercises help you recover faster. Take deep breaths and let the air out slowly. This will:     Help you wake up after surgery.    Help prevent complications like pneumonia.  Preventing complications will help you go home sooner.   We may give you a breathing device (incentive spirometer) to encourage you to breathe deeply.   Nausea and vomiting   You may feel sick to your stomach after surgery; if so, let your nurse know.    Pain control:  After surgery, you may have pain. Our goal is to help you manage your pain. Pain medicine will help you feel comfortable enough to do activities that will help you heal.  These activities may include breathing exercises, walking and physical therapy.   To help your health care team treat your pain we will ask: 1) If you have pain  2) where it is located 3) describe your pain in your words  Methods of pain control include medications given by mouth, vein or by nerve block for some surgeries.  We may give you a pain control pump that will:  1) Deliver the medicine through a tube placed in your vein  2) Control the amount of medicine you receive  3) Allow you to push a button to deliver a dose of pain medicine  Sequential Compression Device (SCD) or Pneumo Boots:  You may need to wear SCD S on your legs or feet. These are wraps connected to a machine that pumps in air and releases it. The repeated pumping helps prevent blood clots from forming.

## 2017-01-26 ENCOUNTER — ANESTHESIA (OUTPATIENT)
Dept: SURGERY | Facility: CLINIC | Age: 37
End: 2017-01-26
Payer: MEDICARE

## 2017-01-26 ENCOUNTER — HOSPITAL ENCOUNTER (OUTPATIENT)
Facility: CLINIC | Age: 37
Discharge: HOME | End: 2017-01-26
Attending: OTOLARYNGOLOGY | Admitting: OTOLARYNGOLOGY
Payer: MEDICARE

## 2017-01-26 VITALS
HEIGHT: 74 IN | TEMPERATURE: 98 F | OXYGEN SATURATION: 100 % | SYSTOLIC BLOOD PRESSURE: 143 MMHG | RESPIRATION RATE: 16 BRPM | WEIGHT: 197.09 LBS | BODY MASS INDEX: 25.29 KG/M2 | DIASTOLIC BLOOD PRESSURE: 86 MMHG

## 2017-01-26 DIAGNOSIS — G89.18 POST-OP PAIN: Primary | ICD-10-CM

## 2017-01-26 PROCEDURE — 25000566 ZZH SEVOFLURANE, EA 15 MIN: Performed by: OTOLARYNGOLOGY

## 2017-01-26 PROCEDURE — 37000009 ZZH ANESTHESIA TECHNICAL FEE, EACH ADDTL 15 MIN: Performed by: OTOLARYNGOLOGY

## 2017-01-26 PROCEDURE — 40000014 ZZH STATISTIC ARTERIAL MONITORING DAILY

## 2017-01-26 PROCEDURE — 36000051 ZZH SURGERY LEVEL 2 1ST 30 MIN - UMMC: Performed by: OTOLARYNGOLOGY

## 2017-01-26 PROCEDURE — 25000128 H RX IP 250 OP 636: Performed by: OTOLARYNGOLOGY

## 2017-01-26 PROCEDURE — 88305 TISSUE EXAM BY PATHOLOGIST: CPT | Performed by: OTOLARYNGOLOGY

## 2017-01-26 PROCEDURE — 25000128 H RX IP 250 OP 636: Performed by: ANESTHESIOLOGY

## 2017-01-26 PROCEDURE — 88264 CHROMOSOME ANALYSIS 20-25: CPT | Performed by: PATHOLOGY

## 2017-01-26 PROCEDURE — 00000159 ZZHCL STATISTIC H-SEND OUTS PREP: Performed by: OTOLARYNGOLOGY

## 2017-01-26 PROCEDURE — 27210794 ZZH OR GENERAL SUPPLY STERILE: Performed by: OTOLARYNGOLOGY

## 2017-01-26 PROCEDURE — 88280 CHROMOSOME KARYOTYPE STUDY: CPT | Performed by: PATHOLOGY

## 2017-01-26 PROCEDURE — 36000053 ZZH SURGERY LEVEL 2 EA 15 ADDTL MIN - UMMC: Performed by: OTOLARYNGOLOGY

## 2017-01-26 PROCEDURE — 88312 SPECIAL STAINS GROUP 1: CPT | Performed by: OTOLARYNGOLOGY

## 2017-01-26 PROCEDURE — 88275 CYTOGENETICS 100-300: CPT | Mod: 59 | Performed by: PATHOLOGY

## 2017-01-26 PROCEDURE — 25000125 ZZHC RX 250: Performed by: OTOLARYNGOLOGY

## 2017-01-26 PROCEDURE — 25000132 ZZH RX MED GY IP 250 OP 250 PS 637: Mod: GY | Performed by: OTOLARYNGOLOGY

## 2017-01-26 PROCEDURE — 88342 IMHCHEM/IMCYTCHM 1ST ANTB: CPT | Performed by: OTOLARYNGOLOGY

## 2017-01-26 PROCEDURE — 88365 INSITU HYBRIDIZATION (FISH): CPT | Performed by: OTOLARYNGOLOGY

## 2017-01-26 PROCEDURE — 25000125 ZZHC RX 250: Performed by: ANESTHESIOLOGY

## 2017-01-26 PROCEDURE — 88185 FLOWCYTOMETRY/TC ADD-ON: CPT | Performed by: PATHOLOGY

## 2017-01-26 PROCEDURE — 40000275 ZZH STATISTIC RCP TIME EA 10 MIN

## 2017-01-26 PROCEDURE — 40000170 ZZH STATISTIC PRE-PROCEDURE ASSESSMENT II: Performed by: OTOLARYNGOLOGY

## 2017-01-26 PROCEDURE — 88271 CYTOGENETICS DNA PROBE: CPT | Performed by: PATHOLOGY

## 2017-01-26 PROCEDURE — 25800025 ZZH RX 258: Performed by: ANESTHESIOLOGY

## 2017-01-26 PROCEDURE — 88184 FLOWCYTOMETRY/ TC 1 MARKER: CPT | Performed by: PATHOLOGY

## 2017-01-26 PROCEDURE — 88341 IMHCHEM/IMCYTCHM EA ADD ANTB: CPT | Performed by: OTOLARYNGOLOGY

## 2017-01-26 PROCEDURE — C9399 UNCLASSIFIED DRUGS OR BIOLOG: HCPCS | Performed by: ANESTHESIOLOGY

## 2017-01-26 PROCEDURE — 71000013 ZZH RECOVERY PHASE 1 LEVEL 1 EA ADDTL HR: Performed by: OTOLARYNGOLOGY

## 2017-01-26 PROCEDURE — 71000012 ZZH RECOVERY PHASE 1 LEVEL 1 FIRST HR: Performed by: OTOLARYNGOLOGY

## 2017-01-26 PROCEDURE — 71000027 ZZH RECOVERY PHASE 2 EACH 15 MINS: Performed by: OTOLARYNGOLOGY

## 2017-01-26 PROCEDURE — 37000008 ZZH ANESTHESIA TECHNICAL FEE, 1ST 30 MIN: Performed by: OTOLARYNGOLOGY

## 2017-01-26 PROCEDURE — 88239 TISSUE CULTURE TUMOR: CPT | Performed by: PATHOLOGY

## 2017-01-26 PROCEDURE — 00000160 ZZHCL STATISTIC H-SPECIAL HANDLING: Performed by: OTOLARYNGOLOGY

## 2017-01-26 RX ORDER — ONDANSETRON 2 MG/ML
4 INJECTION INTRAMUSCULAR; INTRAVENOUS EVERY 30 MIN PRN
Status: DISCONTINUED | OUTPATIENT
Start: 2017-01-26 | End: 2017-01-26 | Stop reason: HOSPADM

## 2017-01-26 RX ORDER — PROPOFOL 10 MG/ML
INJECTION, EMULSION INTRAVENOUS PRN
Status: DISCONTINUED | OUTPATIENT
Start: 2017-01-26 | End: 2017-01-26

## 2017-01-26 RX ORDER — LIDOCAINE HYDROCHLORIDE AND EPINEPHRINE 10; 10 MG/ML; UG/ML
INJECTION, SOLUTION INFILTRATION; PERINEURAL PRN
Status: DISCONTINUED | OUTPATIENT
Start: 2017-01-26 | End: 2017-01-26 | Stop reason: HOSPADM

## 2017-01-26 RX ORDER — ALBUTEROL SULFATE 0.83 MG/ML
2.5 SOLUTION RESPIRATORY (INHALATION) EVERY 4 HOURS PRN
Status: DISCONTINUED | OUTPATIENT
Start: 2017-01-26 | End: 2017-01-26 | Stop reason: HOSPADM

## 2017-01-26 RX ORDER — HYDRALAZINE HYDROCHLORIDE 20 MG/ML
2.5-5 INJECTION INTRAMUSCULAR; INTRAVENOUS EVERY 10 MIN PRN
Status: DISCONTINUED | OUTPATIENT
Start: 2017-01-26 | End: 2017-01-26 | Stop reason: HOSPADM

## 2017-01-26 RX ORDER — LIDOCAINE HYDROCHLORIDE 20 MG/ML
INJECTION, SOLUTION INFILTRATION; PERINEURAL PRN
Status: DISCONTINUED | OUTPATIENT
Start: 2017-01-26 | End: 2017-01-26

## 2017-01-26 RX ORDER — SODIUM CHLORIDE, SODIUM LACTATE, POTASSIUM CHLORIDE, CALCIUM CHLORIDE 600; 310; 30; 20 MG/100ML; MG/100ML; MG/100ML; MG/100ML
INJECTION, SOLUTION INTRAVENOUS CONTINUOUS
Status: DISCONTINUED | OUTPATIENT
Start: 2017-01-26 | End: 2017-01-26 | Stop reason: HOSPADM

## 2017-01-26 RX ORDER — LIDOCAINE 40 MG/G
CREAM TOPICAL
Status: DISCONTINUED | OUTPATIENT
Start: 2017-01-26 | End: 2017-01-26 | Stop reason: HOSPADM

## 2017-01-26 RX ORDER — NALOXONE HYDROCHLORIDE 0.4 MG/ML
.1-.4 INJECTION, SOLUTION INTRAMUSCULAR; INTRAVENOUS; SUBCUTANEOUS
Status: DISCONTINUED | OUTPATIENT
Start: 2017-01-26 | End: 2017-01-26 | Stop reason: HOSPADM

## 2017-01-26 RX ORDER — BACITRACIN 500 [USP'U]/G
OINTMENT OPHTHALMIC PRN
Status: DISCONTINUED | OUTPATIENT
Start: 2017-01-26 | End: 2017-01-26 | Stop reason: HOSPADM

## 2017-01-26 RX ORDER — GLYCOPYRROLATE 0.2 MG/ML
INJECTION, SOLUTION INTRAMUSCULAR; INTRAVENOUS PRN
Status: DISCONTINUED | OUTPATIENT
Start: 2017-01-26 | End: 2017-01-26

## 2017-01-26 RX ORDER — FENTANYL CITRATE 50 UG/ML
25-50 INJECTION, SOLUTION INTRAMUSCULAR; INTRAVENOUS
Status: DISCONTINUED | OUTPATIENT
Start: 2017-01-26 | End: 2017-01-26 | Stop reason: HOSPADM

## 2017-01-26 RX ORDER — EPHEDRINE SULFATE 50 MG/ML
INJECTION, SOLUTION INTRAMUSCULAR; INTRAVENOUS; SUBCUTANEOUS PRN
Status: DISCONTINUED | OUTPATIENT
Start: 2017-01-26 | End: 2017-01-26

## 2017-01-26 RX ORDER — FENTANYL CITRATE 50 UG/ML
INJECTION, SOLUTION INTRAMUSCULAR; INTRAVENOUS PRN
Status: DISCONTINUED | OUTPATIENT
Start: 2017-01-26 | End: 2017-01-26

## 2017-01-26 RX ORDER — MEPERIDINE HYDROCHLORIDE 25 MG/ML
12.5 INJECTION INTRAMUSCULAR; INTRAVENOUS; SUBCUTANEOUS
Status: DISCONTINUED | OUTPATIENT
Start: 2017-01-26 | End: 2017-01-26 | Stop reason: HOSPADM

## 2017-01-26 RX ORDER — CEFAZOLIN SODIUM 2 G/100ML
2 INJECTION, SOLUTION INTRAVENOUS
Status: COMPLETED | OUTPATIENT
Start: 2017-01-26 | End: 2017-01-26

## 2017-01-26 RX ORDER — DEXAMETHASONE SODIUM PHOSPHATE 4 MG/ML
4 INJECTION, SOLUTION INTRA-ARTICULAR; INTRALESIONAL; INTRAMUSCULAR; INTRAVENOUS; SOFT TISSUE EVERY 10 MIN PRN
Status: DISCONTINUED | OUTPATIENT
Start: 2017-01-26 | End: 2017-01-26 | Stop reason: HOSPADM

## 2017-01-26 RX ORDER — CEPHALEXIN 500 MG/1
500 CAPSULE ORAL 3 TIMES DAILY
Qty: 15 CAPSULE | Refills: 0 | Status: SHIPPED | OUTPATIENT
Start: 2017-01-26 | End: 2017-01-31

## 2017-01-26 RX ORDER — ONDANSETRON 4 MG/1
4 TABLET, ORALLY DISINTEGRATING ORAL EVERY 30 MIN PRN
Status: DISCONTINUED | OUTPATIENT
Start: 2017-01-26 | End: 2017-01-26 | Stop reason: HOSPADM

## 2017-01-26 RX ORDER — HYDROMORPHONE HYDROCHLORIDE 1 MG/ML
.3-.5 INJECTION, SOLUTION INTRAMUSCULAR; INTRAVENOUS; SUBCUTANEOUS EVERY 10 MIN PRN
Status: DISCONTINUED | OUTPATIENT
Start: 2017-01-26 | End: 2017-01-26 | Stop reason: HOSPADM

## 2017-01-26 RX ORDER — SODIUM CHLORIDE, SODIUM LACTATE, POTASSIUM CHLORIDE, CALCIUM CHLORIDE 600; 310; 30; 20 MG/100ML; MG/100ML; MG/100ML; MG/100ML
INJECTION, SOLUTION INTRAVENOUS CONTINUOUS PRN
Status: DISCONTINUED | OUTPATIENT
Start: 2017-01-26 | End: 2017-01-26

## 2017-01-26 RX ORDER — HYDROCODONE BITARTRATE AND ACETAMINOPHEN 5; 325 MG/1; MG/1
1-2 TABLET ORAL EVERY 4 HOURS PRN
Qty: 30 TABLET | Refills: 0 | Status: SHIPPED | OUTPATIENT
Start: 2017-01-26 | End: 2017-02-01

## 2017-01-26 RX ORDER — ONDANSETRON 2 MG/ML
INJECTION INTRAMUSCULAR; INTRAVENOUS PRN
Status: DISCONTINUED | OUTPATIENT
Start: 2017-01-26 | End: 2017-01-26

## 2017-01-26 RX ADMIN — LIDOCAINE HYDROCHLORIDE 90 MG: 20 INJECTION, SOLUTION INFILTRATION; PERINEURAL at 07:50

## 2017-01-26 RX ADMIN — Medication 5 MG: at 09:39

## 2017-01-26 RX ADMIN — Medication 5 MG: at 09:10

## 2017-01-26 RX ADMIN — CEFAZOLIN SODIUM 2 G: 2 INJECTION, SOLUTION INTRAVENOUS at 08:24

## 2017-01-26 RX ADMIN — PHENYLEPHRINE HYDROCHLORIDE 100 MCG: 10 INJECTION, SOLUTION INTRAMUSCULAR; INTRAVENOUS; SUBCUTANEOUS at 09:00

## 2017-01-26 RX ADMIN — PROPOFOL 30 MG: 10 INJECTION, EMULSION INTRAVENOUS at 08:10

## 2017-01-26 RX ADMIN — FENTANYL CITRATE 50 MCG: 50 INJECTION, SOLUTION INTRAMUSCULAR; INTRAVENOUS at 07:43

## 2017-01-26 RX ADMIN — REMIFENTANIL HYDROCHLORIDE 0.05 MCG/KG/MIN: 1 INJECTION, POWDER, LYOPHILIZED, FOR SOLUTION INTRAVENOUS at 08:22

## 2017-01-26 RX ADMIN — FENTANYL CITRATE 50 MCG: 50 INJECTION, SOLUTION INTRAMUSCULAR; INTRAVENOUS at 10:59

## 2017-01-26 RX ADMIN — PHENYLEPHRINE HYDROCHLORIDE 100 MCG: 10 INJECTION, SOLUTION INTRAMUSCULAR; INTRAVENOUS; SUBCUTANEOUS at 09:31

## 2017-01-26 RX ADMIN — SUGAMMADEX 360 MG: 100 INJECTION, SOLUTION INTRAVENOUS at 08:16

## 2017-01-26 RX ADMIN — PHENYLEPHRINE HYDROCHLORIDE 100 MCG: 10 INJECTION, SOLUTION INTRAMUSCULAR; INTRAVENOUS; SUBCUTANEOUS at 09:08

## 2017-01-26 RX ADMIN — FENTANYL CITRATE 100 MCG: 50 INJECTION, SOLUTION INTRAMUSCULAR; INTRAVENOUS at 07:50

## 2017-01-26 RX ADMIN — ROCURONIUM BROMIDE 50 MG: 10 INJECTION INTRAVENOUS at 07:50

## 2017-01-26 RX ADMIN — PROPOFOL 30 MG: 10 INJECTION, EMULSION INTRAVENOUS at 09:01

## 2017-01-26 RX ADMIN — PROPOFOL 20 MG: 10 INJECTION, EMULSION INTRAVENOUS at 08:00

## 2017-01-26 RX ADMIN — SODIUM CHLORIDE, POTASSIUM CHLORIDE, SODIUM LACTATE AND CALCIUM CHLORIDE: 600; 310; 30; 20 INJECTION, SOLUTION INTRAVENOUS at 07:30

## 2017-01-26 RX ADMIN — HYDROMORPHONE HYDROCHLORIDE 0.5 MG: 10 INJECTION, SOLUTION INTRAMUSCULAR; INTRAVENOUS; SUBCUTANEOUS at 11:09

## 2017-01-26 RX ADMIN — FENTANYL CITRATE 50 MCG: 50 INJECTION, SOLUTION INTRAMUSCULAR; INTRAVENOUS at 10:46

## 2017-01-26 RX ADMIN — FENTANYL CITRATE 25 MCG: 50 INJECTION, SOLUTION INTRAMUSCULAR; INTRAVENOUS at 09:00

## 2017-01-26 RX ADMIN — ONDANSETRON 4 MG: 2 INJECTION INTRAMUSCULAR; INTRAVENOUS at 09:45

## 2017-01-26 RX ADMIN — HYDROMORPHONE HYDROCHLORIDE 0.5 MG: 10 INJECTION, SOLUTION INTRAMUSCULAR; INTRAVENOUS; SUBCUTANEOUS at 11:26

## 2017-01-26 RX ADMIN — Medication: at 13:04

## 2017-01-26 RX ADMIN — GLYCOPYRROLATE 0.2 MG: 0.2 INJECTION, SOLUTION INTRAMUSCULAR; INTRAVENOUS at 08:18

## 2017-01-26 RX ADMIN — PHENYLEPHRINE HYDROCHLORIDE 50 MCG: 10 INJECTION, SOLUTION INTRAMUSCULAR; INTRAVENOUS; SUBCUTANEOUS at 08:39

## 2017-01-26 ASSESSMENT — VISUAL ACUITY
OU: NORMAL ACUITY

## 2017-01-26 ASSESSMENT — PAIN DESCRIPTION - DESCRIPTORS: DESCRIPTORS: SORE

## 2017-01-26 NOTE — BRIEF OP NOTE
[facility], [place]    Brief Operative Note    Pre-operative diagnosis: Enlarged Right Neck Node   Post-operative diagnosis Same  Procedure: Procedure(s):  Excisional Biopsy Right Level 2A-3  - Wound Class: I-Clean  Surgeon: Surgeon(s) and Role:     * Beka Soto MD - Primary     * Melchor Norwood - Resident - Assisting  Anesthesia: General   Estimated blood loss: 100  Drains: Rubber band drain  Specimens:   ID Type Source Tests Collected by Time Destination   A : Right Neck Content (Level 2 & 3) Tissue Other SURGICAL PATHOLOGY EXAM Beka Stoo MD 1/26/2017  8:42 AM      Findings:   2 Large well capsulated lymph nodes noted at right neck level 2A/3  Complications: None.  Implants: None.

## 2017-01-26 NOTE — DISCHARGE INSTRUCTIONS
"1. Medications:  Resume your home medications. Take pain medications when needed as indicated. Use stool softener to avoid constipation.    2. Wound care:  - Leave dressing on for 24 hours, then you may remove the dressing. There is a yellow rubber drain that will come out with the dressing at the same time  - After removing the dressing, use Auqaphor to wound thee times daily    3. Please call MD or come to the ED for shortness of breath, trouble breathing, inability to tolerate liquids, intractable dizziness, or signs of infection such as fevers or redness.    Call the [phone] clinic number if you have any questions and concerns during the date and call [phone] at night and ask for \"ENT resident on call\".    6. Follow up with Dr. Soto in 1 week    [facility]  Same-Day Surgery   Adult Discharge Orders & Instructions     For 24 hours after surgery    1. Get plenty of rest.  A responsible adult must stay with you for at least 24 hours after you leave the hospital.   2. Do not drive or use heavy equipment.  If you have weakness or tingling, don't drive or use heavy equipment until this feeling goes away.  3. Do not drink alcohol.  4. Avoid strenuous or risky activities.  Ask for help when climbing stairs.   5. You may feel lightheaded.  IF so, sit for a few minutes before standing.  Have someone help you get up.   6. If you have nausea (feel sick to your stomach): Drink only clear liquids such as apple juice, ginger ale, broth or 7-Up.  Rest may also help.  Be sure to drink enough fluids.  Move to a regular diet as you feel able.  7. You may have a slight fever. Call the doctor if your fever is over 100.4 F (taken under the tongue) or lasts longer than 24 hours.  8. You may have a dry mouth, a sore throat, muscle aches or trouble sleeping.  These should go away after 24 hours.  9. Do not make important or legal decisions.     Call your doctor for any of the " followin.  Signs of infection (fever, growing tenderness at the surgery site, a large amount of drainage or bleeding, severe pain, foul-smelling drainage, redness, swelling).    2. It has been over 8 to 10 hours since surgery and you are still not able to urinate (pass water).    3.  Headache for over 24 hours.      To contact a doctor, call Dr Soto's office at [phone] or:        After hours, call [phone] and ask for the resident on call for ENT (answered 24 hours a day)      Emergency Department:    [facility]: [phone]       (TTY for hearing impaired: [phone])

## 2017-01-26 NOTE — IP AVS SNAPSHOT
Same Day Surgery [address] [address]    Phone:  [phone]                                       After Visit Summary   1/26/2017    Cricket Chen    MRN: [id number]           After Visit Summary Signature Page     I have received my discharge instructions, and my questions have been answered. I have discussed any challenges I see with this plan with the nurse or doctor.    ..........................................................................................................................................  Patient/Patient Representative Signature      ..........................................................................................................................................  Patient Representative Print Name and Relationship to Patient    ..................................................               ................................................  Date                                            Time    ..........................................................................................................................................  Reviewed by Signature/Title    ...................................................              ..............................................  Date                                                            Time

## 2017-01-26 NOTE — OP NOTE
DATE OF SERVICE:  01/26/2017.      PREOPERATIVE DIAGNOSIS:  Right cervical lymphadenopathy, R/O PTLD.      POSTOPERATIVE DIAGNOSIS:  Right cervical lymphadenopathy, R/O PTLD.      PROCEDURES PERFORMED:  Right neck level II and III lymph node excisional biopsy.      SURGEONS:  Melchor Norwood MD; Beka Soto MD    ANESTHESIA:  GETA    COMPLICATIONS:  None.      ESTIMATED BLOOD LOSS:  100 mL.      SPECIMENS:  Right neck level II and III contents.     FINDINGS:  Two large well encapsulated lymph nodes noted in right neck level IIA to III.  The superior one was approximately 5 cm in diameter and the inferior one is approximately 3 cm in diameter.  Removed without complication, although the capsule of the lower mass was torn as the tumor was removed.  The XI nerve was intact after surgery.     INDICATIONS:  Mr. Cricket Chen is a 36-year-old male with a history of prior kidney and liver transplant who noted to have a large lymph node on the right neck.  FNA biopsy was inconclusive.  However, the patient was also found to have a high EBV viral load, and therefore the patient was felt to be at high risk of  post-transplant lymphoproliferative disorder (PTLD).  In order to obtain a definitive diagnosis, the patient elected to proceed with excisional biopsy of the lymph node.      DESCRIPTION OF PROCEDURE:  After properly identifying the patient and obtaining signed informed consent, the patient was transferred to the operative room.  General anesthesia was induced and the patient was intubated orotracheally by Anesthesia staff.  The patient was placed in a supine position and the table was turned 180 degrees to facilitate the procedure.  A shoulder roll was placed to extend the patient's neck.  Nerve monitor was placed to monitor the patient's right marginal mandibular branch of the facial nerve as well as the trapezius muscle.  The impedance was checked and the monitor was confirmed with a tap test.  The patient's right neck and  face was then prepped and draped in the usual sterile fashion.  A timeout was carried out immediately prior to the procedure to confirm the identity of the patient and correctness of the procedures.      A linear incision was then made over the right neck mass in a skin crease.  The platysma muscle was identified and  with monopolar cautery.  The superior and inferior subplatysmal flaps were then raised.  The fascia over the sternocleidomastoid muscle was then opened along the anterior border of the sternocleidomastoid.  There were many nodes deep to the SCM in levels II and III, but two were particularly enlarged and well encapsulated.  These lymph nodes were located between the sternocleidomastoid muscle and the great vessels.  Careful dissection was then made along the capsule of the mass.  The fibrous attachment, feeding lymphatic channels as well as the blood vessels were  sharply after careful cauterization.  The inferior mass was first removed.  During removal the capsule was torn, and the mass was partially ruptured.  All the exposed contents from the mass were carefully picked it out from the wound.      The superior mass was then removed after fully mobilized from the surrounding attachments.  During the dissection of the superior mass posteriorly, there was a twitch of the CN XI muscles, and we looked carefully for XI in that area.  After the tumor had been delivered intact, we identified cranial nerve XI along the medial border of the sternocleidomastoid muscle.  The nerve stim on this structure stimulated only weekly, but it appeared to be completely intact.      The wound was then irrigated with a copious amount of normal saline and suctioned out.  Hemostasis was achieved with bipolar cautery.  The wound was then closed with 3-0 Vicryl to close the platysma muscle and 5-0 nylon in a running fashion to close the skin.  A yellow rubber band drain was placed under the wound to facilitate  drainage.  Bacitracin was placed on the wound and the drain was sutured on the dressing.  This concluded the  procedure.  The patient was then turned back to Anesthesia staff awakened from general anesthesia without difficulties.  Dr. Soto was present during the entire procedure.      DISPOSITION:  The patient was transferred to the PACU area in stable condition.  The patient was noted to have strong movement of the right orbicularis oris and depressor anguli muscles as well as right trapezius muscles.  We will follow up the patient in the clinic in 1 week for suture removal.  We will send the patient home with Norco for pain and Keflex antibiotics.        KEVIN SOTO MD       As dictated by ANGELY NORWOOD MD            ADDENDUM:  I was present with the resident during all critical portions of the operation, and immediately available for final skin closure.  I have edited Dr. Norwood's note to reflect my perspective on operative findings and flow.        Kevin Soto MD  Otolaryngology/Head & Neck Surgery  [phone]          D: 2017 12:57   T: 2017 13:31   MT: HANNAH      Name:     IJEOMA DOBSON   MRN:      [id number]        Account:        [id number]   :      1980           Procedure Date: 2017      Document: [id number]

## 2017-01-26 NOTE — ANESTHESIA CARE TRANSFER NOTE
Patient: Cricket Chen    BIOPSY LYMPH NODE CERVICAL (Right Neck)  Additional InformationProcedure(s):  Excisional Biopsy Right Level 2A-3  - Wound Class: I-Clean    Diagnosis: Enlarged Right Neck Node   Diagnosis Additional Information: No value filed.    Anesthesia Type:   General, ETT     Note:  Airway :Face Mask  Patient transferred to:PACU  Comments: Pt transferred to the PACU on 8L facemask. Breathing spontaneously. Not complaining of pain or nausea. VSS on arrival. Report given to PACU nurses.      Vitals: (Last set prior to Anesthesia Care Transfer)              Electronically Signed By: Adam Lara MD  January 26, 2017  10:27 AM

## 2017-01-26 NOTE — ANESTHESIA PROCEDURE NOTES
Arterial Line Procedure Note  Staff:     Anesthesiologist:  LEODAN ALEXANDER    Resident/CRNA:  GARCIA ROMEO    Arterial line performed by resident/CRNA in presence of a teaching physician    Location: In OR After Induction  Procedure Start/Stop Times:     patient identified, IV checked, site marked, risks and benefits discussed, informed consent, monitors and equipment checked, pre-op evaluation and at physician/surgeon's request      Correct Patient: Yes      Correct Position: Yes      Correct Site: Yes      Correct Procedure: Yes      Correct Laterality:  Yes    Site Marked:  Yes  Line Placement:     Procedure:  Arterial Line    Insertion Site:  Radial    Insertion laterality:  Left    Skin Prep: Chloraprep      Patient Prep: patient draped, mask, sterile gloves, hat and hand hygiene      Local skin infiltration:  None    Ultrasound Guided?: No      Catheter size:  20 gauge, 12 cm    Cath secured with: suture      Dressing:  Tegaderm    Complications:  None obvious    Arterial waveform: Yes      IBP within 10% of NIBP: Yes

## 2017-01-26 NOTE — IP AVS SNAPSHOT
MRN:[id number]                      After Visit Summary   1/26/2017    Cricket Chen    MRN: [id number]           Thank you!     Thank you for choosing [facility] for your care. Our goal is always to provide you with excellent care. Hearing back from our patients is one way we can continue to improve our services. Please take a few minutes to complete the written survey that you may receive in the mail after you visit with us. Thank you!        Patient Information     Date Of Birth          1980        About your hospital stay     You were admitted on:  January 26, 2017 You last received care in the:  Same Day Surgery [facility]    You were discharged on:  January 26, 2017       Who to Call     For medical emergencies, please call 911.  For non-urgent questions about your medical care, please call your primary care provider or clinic, [phone]  For questions related to your surgery, please call your surgery clinic        Attending Provider     Provider    Beka Soto MD       Primary Care Provider Office Phone # Fax #    [place] Katerina Perez [phone]       [address] [address]        After Care Instructions     Diet Instructions       Resume pre procedure diet            Discharge Instructions - Lifting restrictions       Lifting Restrictions 10 pounds until seen at Post-op follow up appointment            Discharge Instructions       Patient to follow up with Dr. Soto in 1 week                  Your next 10 appointments already scheduled     Feb 01, 2017  9:00 AM   (Arrive by 8:45 AM)   New Patient Visit with Ileana Reddy MD   [facility] ([facility])    [address] [facility] [address]  2nd Floor  [facility] [id number] [phone]            Apr 04, 2017 12:10 PM   (Arrive by 11:55 AM)   Return General Liver with Laureen Galvan MD   [facility] ([facility]  [facility] [place])    [address] [id number] [phone]            Apr 18, 2017 10:30 AM   (Arrive by 10:00 AM)   Return Kidney Transplant with Jake Sidhu MD   [facility] ([facility])    [address] [place] [id number] [phone]              Additional Services     PHYSICAL THERAPY REFERRAL       *This therapy referral will be filtered to a centralized scheduling office at [facility] and the patient will receive a call to schedule an appointment at a [facility] location most convenient for them. *     [facility] provides Physical Therapy evaluation and treatment and many specialty services across the [facility] system.  If requesting a specialty program, please choose from the list below.    If you have not heard from the scheduling office within 2 business days, please call [phone] for all locations, with the exception of [place], please call [phone].  Treatment: Evaluation & Treatment  Special Instructions/Modalities: Right shoulder ROM exercise  Special Programs: None    Please be aware that coverage of these services is subject to the terms and limitations of your health insurance plan.  Call member services at your health plan with any benefit or coverage questions.      **Note to Provider:  If you are referring outside of [facility] for the therapy appointment, please list the name of the location in the  special instructions  above, print the referral and give to the patient to schedule the appointment.                  Further instructions from your care team       1. Medications:  Resume your home medications. Take pain medications when needed as indicated. Use stool softener to avoid constipation.    2. Wound care:  - Leave dressing on for 24 hours, then you may remove the dressing. There is a yellow rubber drain that will come out with  "the dressing at the same time  - After removing the dressing, use Auqaphor to wound thee times daily    3. Please call MD or come to the ED for shortness of breath, trouble breathing, inability to tolerate liquids, intractable dizziness, or signs of infection such as fevers or redness.    Call the [phone] clinic number if you have any questions and concerns during the date and call [phone] at night and ask for \"ENT resident on call\".    6. Follow up with Dr. Soto in 1 week    [facility]  Same-Day Surgery   Adult Discharge Orders & Instructions     For 24 hours after surgery    1. Get plenty of rest.  A responsible adult must stay with you for at least 24 hours after you leave the hospital.   2. Do not drive or use heavy equipment.  If you have weakness or tingling, don't drive or use heavy equipment until this feeling goes away.  3. Do not drink alcohol.  4. Avoid strenuous or risky activities.  Ask for help when climbing stairs.   5. You may feel lightheaded.  IF so, sit for a few minutes before standing.  Have someone help you get up.   6. If you have nausea (feel sick to your stomach): Drink only clear liquids such as apple juice, ginger ale, broth or 7-Up.  Rest may also help.  Be sure to drink enough fluids.  Move to a regular diet as you feel able.  7. You may have a slight fever. Call the doctor if your fever is over 100.4 F (taken under the tongue) or lasts longer than 24 hours.  8. You may have a dry mouth, a sore throat, muscle aches or trouble sleeping.  These should go away after 24 hours.  9. Do not make important or legal decisions.     Call your doctor for any of the followin.  Signs of infection (fever, growing tenderness at the surgery site, a large amount of drainage or bleeding, severe pain, foul-smelling drainage, redness, swelling).    2. It has been over 8 to 10 hours since surgery and you are still not able to urinate (pass water).    3.  " "Headache for over 24 hours.      To contact a doctor, call Dr Soto's office at [phone] or:        After hours, call [phone] and ask for the resident on call for ENT (answered 24 hours a day)      Emergency Department:    [facility]: [phone]       (TTY for hearing impaired: [phone])              Pending Results     Date and Time Order Name Status Description    1/26/2017 1106 Leukemia Lymphoma Evaluation (Flow Cytometry) In process     1/26/2017 0843 Surgical pathology exam In process             Admission Information        Provider Department Dept Phone    1/26/2017 Beka Soto MD [facility] Preop/Phase [phone]      Your Vitals Were     Blood Pressure Temperature Respirations    130/72 mmHg 98.6  F (37  C) (Oral) 16    Height Weight BMI (Body Mass Index)    1.867 m (6' 1.5\") 89.4 kg (197 lb 1.5 oz) 25.65 kg/m2    Pulse Oximetry          96%        Parents[facility] Information     [email or web address] gives you secure access to your electronic health record. If you see a primary care provider, you can also send messages to your care team and make appointments. If you have questions, please call your primary care clinic.  If you do not have a primary care provider, please call [phone] and they will assist you.        Care EveryWhere ID     This is your Care EveryWhere ID. This could be used by other organizations to access your [facility] medical records  [id number]           Review of your medicines      START taking        Dose / Directions    cephALEXin 500 MG capsule   Commonly known as:  KEFLEX   Used for:  Post-op pain        Dose:  500 mg   Take 1 capsule (500 mg) by mouth 3 times daily for 5 days   Quantity:  15 capsule   Refills:  0       HYDROcodone-acetaminophen 5-325 MG per tablet   Commonly known as:  NORCO   Used for:  Post-op pain        Dose:  1-2 tablet   Take 1-2 tablets by mouth every 4 hours as needed for other (Moderate to Severe Pain)   Quantity:  30 tablet   Refills:  0       "   CONTINUE these medicines which have NOT CHANGED        Dose / Directions    amLODIPine 10 MG tablet   Commonly known as:  NORVASC   Used for:  HTN (hypertension)        Dose:  10 mg   Take 1 tablet (10 mg) by mouth daily   Quantity:  90 tablet   Refills:  3       amoxicillin-clavulanate 875-125 MG per tablet   Commonly known as:  AUGMENTIN   Used for:  Tonsillitis        Dose:  1 tablet   Take 1 tablet by mouth 2 times daily   Quantity:  20 tablet   Refills:  0       lisinopril 5 MG tablet   Commonly known as:  PRINIVIL/ZESTRIL   Used for:  HTN (hypertension)        Dose:  5 mg   Take 1 tablet (5 mg) by mouth daily   Quantity:  90 tablet   Refills:  3       magnesium oxide 400 (241.3 MG) MG tablet   Commonly known as:  MAG-OX   Used for:  Hypomagnesemia        Dose:  800 mg   Take 2 tablets (800 mg) by mouth 2 times daily   Quantity:  120 tablet   Refills:  3       mycophenolate 250 MG capsule   Commonly known as:  CELLCEPT - GENERIC EQUIVALENT   Used for:  Liver replaced by transplant (H)        Dose:  750 mg   Take 3 capsules (750 mg) by mouth 2 times daily   Quantity:  180 capsule   Refills:  11       sulfamethoxazole-trimethoprim 400-80 MG per tablet   Commonly known as:  BACTRIM/SEPTRA   Indication:  PCP Prophylaxis   Used for:  Liver replaced by transplant (H), S/P kidney transplant        Dose:  1 tablet   Take 1 tablet by mouth daily   Quantity:  30 tablet   Refills:  11       tacrolimus capsule   Used for:  Liver replaced by transplant (H), S/P kidney transplant        4 mg AM and 3 mg PM   Quantity:  210 capsule   Refills:  11       traMADol 50 MG tablet   Commonly known as:  ULTRAM   Used for:  Chronic pain of left knee        Dose:  50 mg   Take 1 tablet (50 mg) by mouth every 8 hours as needed for moderate pain   Quantity:  45 tablet   Refills:  0       vitamin D 2000 UNITS tablet   Used for:  Vitamin D deficiency        Dose:  2000 Units   Take 2,000 Units by mouth daily   Quantity:  100 tablet    Refills:  3            Where to get your medicines      These medications were sent to [facility] - [place], [place] - [address]   [address] [facility] [address]     Phone:  [phone]    - cephALEXin 500 MG capsule      Some of these will need a paper prescription and others can be bought over the counter. Ask your nurse if you have questions.     Bring a paper prescription for each of these medications    - HYDROcodone-acetaminophen 5-325 MG per tablet             Protect others around you: Learn how to safely use, store and throw away your medicines at www.disposemymeds.org.             Medication List: This is a list of all your medications and when to take them. Check marks below indicate your daily home schedule. Keep this list as a reference.      Medications           Morning Afternoon Evening Bedtime As Needed    amLODIPine 10 MG tablet   Commonly known as:  NORVASC   Take 1 tablet (10 mg) by mouth daily                                amoxicillin-clavulanate 875-125 MG per tablet   Commonly known as:  AUGMENTIN   Take 1 tablet by mouth 2 times daily                                cephALEXin 500 MG capsule   Commonly known as:  KEFLEX   Take 1 capsule (500 mg) by mouth 3 times daily for 5 days                                HYDROcodone-acetaminophen 5-325 MG per tablet   Commonly known as:  NORCO   Take 1-2 tablets by mouth every 4 hours as needed for other (Moderate to Severe Pain)                                lisinopril 5 MG tablet   Commonly known as:  PRINIVIL/ZESTRIL   Take 1 tablet (5 mg) by mouth daily                                magnesium oxide 400 (241.3 MG) MG tablet   Commonly known as:  MAG-OX   Take 2 tablets (800 mg) by mouth 2 times daily                                mycophenolate 250 MG capsule   Commonly known as:  CELLCEPT - GENERIC EQUIVALENT   Take 3 capsules (750 mg) by mouth 2 times daily                                 sulfamethoxazole-trimethoprim 400-80 MG per tablet   Commonly known as:  BACTRIM/SEPTRA   Take 1 tablet by mouth daily                                tacrolimus capsule   4 mg AM and 3 mg PM                                traMADol 50 MG tablet   Commonly known as:  ULTRAM   Take 1 tablet (50 mg) by mouth every 8 hours as needed for moderate pain                                vitamin D 2000 UNITS tablet   Take 2,000 Units by mouth daily

## 2017-01-26 NOTE — ANESTHESIA POSTPROCEDURE EVALUATION
Patient: Cricket Chen    BIOPSY LYMPH NODE CERVICAL (Right Neck)  Additional InformationProcedure(s):  Excisional Biopsy Right Level 2A-3  - Wound Class: I-Clean    Diagnosis:Enlarged Right Neck Node   Diagnosis Additional Information: No value filed.    Anesthesia Type:  General, ETT    Note:  Anesthesia Post Evaluation    Patient location during evaluation: PACU  Patient participation: Able to fully participate in evaluation  Level of consciousness: awake  Pain management: adequate  Airway patency: patent  Cardiovascular status: acceptable  Respiratory status: acceptable  Hydration status: acceptable  PONV: none     Anesthetic complications: None          Last vitals:  Filed Vitals:    01/26/17 1045 01/26/17 1100 01/26/17 1115   BP: 141/80 140/74 138/74   Temp:      Resp:      SpO2: 100% 100% 99%       Electronically Signed By: Hernán Bryan MD  January 26, 2017  11:23 AM

## 2017-01-27 LAB — COPATH REPORT: NORMAL

## 2017-01-29 ASSESSMENT — ENCOUNTER SYMPTOMS
SKIN CHANGES: 0
NAIL CHANGES: 0
POOR WOUND HEALING: 0

## 2017-01-30 ENCOUNTER — CARE COORDINATION (OUTPATIENT)
Dept: OTOLARYNGOLOGY | Facility: CLINIC | Age: 37
End: 2017-01-30

## 2017-01-30 DIAGNOSIS — R29.898 DECREASED RANGE OF MOTION OF NECK: Primary | ICD-10-CM

## 2017-01-30 NOTE — PROGRESS NOTES
Called patient to follow up regarding surgery with Dr. Soto last week. Patient doing well. Had an allergic reaction over the weekend from pain medication. Is now taking tramadol intermittently which is adequately controlling the pain. Patient will follow up on Friday for suture removal and post op check.    Lorenza Oneill RN, BSN.  #[phone]  1/30/2017 2:34 PM

## 2017-02-01 ENCOUNTER — ONCOLOGY VISIT (OUTPATIENT)
Dept: ONCOLOGY | Facility: CLINIC | Age: 37
End: 2017-02-01
Attending: INTERNAL MEDICINE
Payer: COMMERCIAL

## 2017-02-01 ENCOUNTER — TELEPHONE (OUTPATIENT)
Dept: TRANSPLANT | Facility: CLINIC | Age: 37
End: 2017-02-01

## 2017-02-01 ENCOUNTER — APPOINTMENT (OUTPATIENT)
Dept: LAB | Facility: CLINIC | Age: 37
End: 2017-02-01
Attending: INTERNAL MEDICINE
Payer: COMMERCIAL

## 2017-02-01 VITALS
TEMPERATURE: 96.8 F | SYSTOLIC BLOOD PRESSURE: 134 MMHG | WEIGHT: 198.2 LBS | HEART RATE: 74 BPM | HEIGHT: 74 IN | BODY MASS INDEX: 25.44 KG/M2 | DIASTOLIC BLOOD PRESSURE: 79 MMHG | OXYGEN SATURATION: 100 %

## 2017-02-01 DIAGNOSIS — D47.Z1 POST-TRANSPLANT LYMPHOPROLIFERATIVE DISORDER (H): Primary | ICD-10-CM

## 2017-02-01 DIAGNOSIS — Z94.4 LIVER REPLACED BY TRANSPLANT (H): Primary | ICD-10-CM

## 2017-02-01 DIAGNOSIS — Z94.0 S/P KIDNEY TRANSPLANT: ICD-10-CM

## 2017-02-01 LAB
ALBUMIN SERPL-MCNC: 4.3 G/DL (ref 3.4–5)
ALP SERPL-CCNC: 150 U/L (ref 40–150)
ALT SERPL W P-5'-P-CCNC: 69 U/L (ref 0–70)
ANION GAP SERPL CALCULATED.3IONS-SCNC: 9 MMOL/L (ref 3–14)
APTT PPP: 33 SEC (ref 22–37)
AST SERPL W P-5'-P-CCNC: 14 U/L (ref 0–45)
BASOPHILS # BLD AUTO: 0 10E9/L (ref 0–0.2)
BASOPHILS NFR BLD AUTO: 0.3 %
BILIRUB SERPL-MCNC: 0.6 MG/DL (ref 0.2–1.3)
BUN SERPL-MCNC: 14 MG/DL (ref 7–30)
CALCIUM SERPL-MCNC: 9 MG/DL (ref 8.5–10.1)
CHLORIDE SERPL-SCNC: 107 MMOL/L (ref 94–109)
CO2 SERPL-SCNC: 26 MMOL/L (ref 20–32)
CREAT SERPL-MCNC: 1.19 MG/DL (ref 0.66–1.25)
DIFFERENTIAL METHOD BLD: ABNORMAL
EOSINOPHIL # BLD AUTO: 0.1 10E9/L (ref 0–0.7)
EOSINOPHIL NFR BLD AUTO: 1.7 %
ERYTHROCYTE [DISTWIDTH] IN BLOOD BY AUTOMATED COUNT: 12.8 % (ref 10–15)
FIBRINOGEN PPP-MCNC: 379 MG/DL (ref 200–420)
GFR SERPL CREATININE-BSD FRML MDRD: 69 ML/MIN/1.7M2
GLUCOSE SERPL-MCNC: 84 MG/DL (ref 70–99)
HCT VFR BLD AUTO: 42 % (ref 40–53)
HGB BLD-MCNC: 14.1 G/DL (ref 13.3–17.7)
IMM GRANULOCYTES # BLD: 0 10E9/L (ref 0–0.4)
IMM GRANULOCYTES NFR BLD: 0.3 %
INR PPP: 0.99 (ref 0.86–1.14)
LDH SERPL L TO P-CCNC: 162 U/L (ref 85–227)
LYMPHOCYTES # BLD AUTO: 0.6 10E9/L (ref 0.8–5.3)
LYMPHOCYTES NFR BLD AUTO: 17.6 %
MCH RBC QN AUTO: 30.3 PG (ref 26.5–33)
MCHC RBC AUTO-ENTMCNC: 33.6 G/DL (ref 31.5–36.5)
MCV RBC AUTO: 90 FL (ref 78–100)
MONOCYTES # BLD AUTO: 0.3 10E9/L (ref 0–1.3)
MONOCYTES NFR BLD AUTO: 9.2 %
NEUTROPHILS # BLD AUTO: 2.5 10E9/L (ref 1.6–8.3)
NEUTROPHILS NFR BLD AUTO: 70.9 %
NRBC # BLD AUTO: 0 10*3/UL
NRBC BLD AUTO-RTO: 0 /100
PLATELET # BLD AUTO: 108 10E9/L (ref 150–450)
POTASSIUM SERPL-SCNC: 4.7 MMOL/L (ref 3.4–5.3)
PROT SERPL-MCNC: 7.7 G/DL (ref 6.8–8.8)
RBC # BLD AUTO: 4.66 10E12/L (ref 4.4–5.9)
SODIUM SERPL-SCNC: 142 MMOL/L (ref 133–144)
URATE SERPL-MCNC: 5.9 MG/DL (ref 3.5–7.2)
WBC # BLD AUTO: 3.5 10E9/L (ref 4–11)

## 2017-02-01 PROCEDURE — 84550 ASSAY OF BLOOD/URIC ACID: CPT | Performed by: INTERNAL MEDICINE

## 2017-02-01 PROCEDURE — 00000402 ZZHCL STATISTIC TOTAL PROTEIN: Performed by: INTERNAL MEDICINE

## 2017-02-01 PROCEDURE — 36415 COLL VENOUS BLD VENIPUNCTURE: CPT

## 2017-02-01 PROCEDURE — 84165 PROTEIN E-PHORESIS SERUM: CPT | Performed by: INTERNAL MEDICINE

## 2017-02-01 PROCEDURE — 82784 ASSAY IGA/IGD/IGG/IGM EACH: CPT | Performed by: INTERNAL MEDICINE

## 2017-02-01 PROCEDURE — 85610 PROTHROMBIN TIME: CPT | Performed by: INTERNAL MEDICINE

## 2017-02-01 PROCEDURE — 82232 ASSAY OF BETA-2 PROTEIN: CPT | Performed by: INTERNAL MEDICINE

## 2017-02-01 PROCEDURE — 83615 LACTATE (LD) (LDH) ENZYME: CPT | Performed by: INTERNAL MEDICINE

## 2017-02-01 PROCEDURE — 85025 COMPLETE CBC W/AUTO DIFF WBC: CPT | Performed by: INTERNAL MEDICINE

## 2017-02-01 PROCEDURE — 80053 COMPREHEN METABOLIC PANEL: CPT | Performed by: INTERNAL MEDICINE

## 2017-02-01 PROCEDURE — 99212 OFFICE O/P EST SF 10 MIN: CPT

## 2017-02-01 PROCEDURE — 85730 THROMBOPLASTIN TIME PARTIAL: CPT | Performed by: INTERNAL MEDICINE

## 2017-02-01 PROCEDURE — 99205 OFFICE O/P NEW HI 60 MIN: CPT | Mod: ZP | Performed by: INTERNAL MEDICINE

## 2017-02-01 PROCEDURE — 85384 FIBRINOGEN ACTIVITY: CPT | Performed by: INTERNAL MEDICINE

## 2017-02-01 PROCEDURE — 86334 IMMUNOFIX E-PHORESIS SERUM: CPT | Performed by: INTERNAL MEDICINE

## 2017-02-01 RX ORDER — TACROLIMUS 1 MG/1
3 CAPSULE, GELATIN COATED ORAL 2 TIMES DAILY
Qty: 180 CAPSULE | Refills: 11 | Status: SHIPPED | OUTPATIENT
Start: 2017-02-01 | End: 2017-04-05

## 2017-02-01 ASSESSMENT — PAIN SCALES - GENERAL: PAINLEVEL: NO PAIN (0)

## 2017-02-01 NOTE — NURSING NOTE
Chief Complaint   Patient presents with     Oncology Clinic Visit     EBV POS, PTLD concern     Blood Draw     Labs drawn by RN     Labs drawn by vascular access RN using US from L forearm.  Tamara Osborn, RN

## 2017-02-01 NOTE — MR AVS SNAPSHOT
After Visit Summary   2/1/2017    Cricket Chen    MRN: [id number]           Patient Information     Date Of Birth          1980        Visit Information        Provider Department      2/1/2017 9:00 AM Ileana Reddy MD [facility]        Today's Diagnoses     Post-transplant lymphoproliferative disorder (H)    -  1        Follow-ups after your visit        Follow-up notes from your care team     Return in about 12 days (around 2/13/2017) for f/u Barry.      Your next 10 appointments already scheduled     Feb 01, 2017 10:15 AM   [facility] Lab Draw with  MAS[facility]IC LAB DRAW   [facility] Lab Draw ([facility])    [address] [facility]  2nd Floor  [facility] [id number] [phone]            Feb 03, 2017  2:50 PM   (Arrive by 2:35 PM)   Return Visit with Beka Soto MD   [facility] Ear Nose and Throat ([facility])    [address] [place] [address]  4th Floor  [facility] [id number] [phone]            Feb 04, 2017 10:30 AM   PE NPET ONCOLOGY (EYES TO THIGHS) with UUPET1   [facility] PET CT ([facility])    [address] [facility] [id number] [phone]           Tell your doctor:   If there is any chance you may be pregnant or if you are breastfeeding.   If you have problems lying in small spaces (claustrophobia). If you do, your doctor may give you medicine to help you relax. If you have diabetes:   Have your exam early in the morning. Your blood glucose will go up as the day goes by.   Your glucose level must be 180 or less at the start of the exam. Please take any medicines you need to ensure this blood glucose level. 24 hours before your scan: Don t do any heavy exercise. (No jogging, aerobics or other workouts.) Exercise will make your pictures less accurate. 6 hours before your scan:   Stop all food and liquids (except water).   Do not chew gum or  suck on mints.   If you need to take medicine with food, you may take it with a few crackers.  Please call your Imaging Department at your exam site with any questions.            Feb 07, 2017  9:00 AM   M[facility]ic Lab Draw with [facility] MASONIC LAB DRAW   [facility] Lab Draw ([facility])    [address] [place] [id number] [phone]            Feb 07, 2017  9:30 AM   (Arrive by 9:15 AM)   BONE MARROW BIOPSY with Lita Velez PA-C,  BONE MARROW BIOPSY   [facility] ([facility])    [address] [place] [id number] [phone]           You may eat and drink prior to the procedure. You will have labs drawn prior to the procedure. You will be awake for the procedure. You will need a  to take you home. Please talk to your doctor about when to stop taking blood thinning medications. Please call our triage nurses with any questions that you may have at [phone].            Feb 15, 2017  9:00 AM   (Arrive by 8:45 AM)   Return Visit with Ileana Reddy MD   [facility] ([facility])    [address] [place] [id number] [phone]            Apr 04, 2017 12:10 PM   (Arrive by 11:55 AM)   Return General Liver with Laureen Galvan MD   [facility] Hepatology ([facility])    [address] [place] [id number] [phone]            Apr 18, 2017 10:30 AM   (Arrive by 10:00 AM)   Return Kidney Transplant with Jkae Sidhu MD   [facility] Nephrology ([facility])    [address] [place] [id number] [phone]              Future tests that were ordered for you today     Open Future Orders        Priority Expected Expires Ordered    NPET Oncology (Eyes to Thighs) Routine  8/30/2017 2/1/2017     CBC with platelets differential Routine 2/1/2017 3/1/2017 2/1/2017    Comprehensive metabolic panel Routine 2/1/2017 3/1/2017 2/1/2017    INR Routine 2/1/2017 3/1/2017 2/1/2017    Partial thromboplastin time Routine 2/1/2017 3/1/2017 2/1/2017    Fibrinogen activity Routine 2/1/2017 3/1/2017 2/1/2017    Lactate Dehydrogenase Routine 2/1/2017 3/1/2017 2/1/2017    Beta 2 microglobulin Routine 2/1/2017 3/1/2017 2/1/2017    Protein Immunofixation Serum Routine 2/1/2017 3/1/2017 2/1/2017    IgA Routine 2/1/2017 3/1/2017 2/1/2017    IgG Routine 2/1/2017 3/1/2017 2/1/2017    IgM Routine 2/1/2017 3/1/2017 2/1/2017    Protein electrophoresis Routine 2/1/2017 3/1/2017 2/1/2017    Uric acid Routine 2/1/2017 3/1/2017 2/1/2017            Who to contact     If you have questions or need follow up information about today's clinic visit or your schedule please contact [facility] directly at [phone].  Normal or non-critical lab and imaging results will be communicated to you by [employer], letter or phone within 4 business days after the clinic has received the results. If you do not hear from us within 7 days, please contact the clinic through Gutenbergzt or phone. If you have a critical or abnormal lab result, we will notify you by phone as soon as possible.  Submit refill requests through Filtec or call your pharmacy and they will forward the refill request to us. Please allow 3 business days for your refill to be completed.          Additional Information About Your Visit        Filtec Information     [employer] gives you secure access to your electronic health record. If you see a primary care provider, you can also send messages to your care team and make appointments. If you have questions, please call your primary care clinic.  If you do not have a primary care provider, please call [phone] and they will assist you.        Care EveryWhere ID     This is your Care EveryWhere ID. This could be used by  "other organizations to access your [facility] medical records  [id number]        Your Vitals Were     Pulse Temperature Height BMI (Body Mass Index) Pulse Oximetry       74 96.8  F (36  C) (Tympanic) 1.867 m (6' 1.5\") 25.79 kg/m2 100%        Blood Pressure from Last 3 Encounters:   02/01/17 134/79   01/26/17 143/86   01/24/17 123/80    Weight from Last 3 Encounters:   02/01/17 89.903 kg (198 lb 3.2 oz)   01/26/17 89.4 kg (197 lb 1.5 oz)   01/24/17 89.132 kg (196 lb 8 oz)               Primary Care Provider Office Phone # Fax #    Eugenia Perez [phone]       [address]  [facility] [address]        Thank you!     Thank you for choosing [facility]  for your care. Our goal is always to provide you with excellent care. Hearing back from our patients is one way we can continue to improve our services. Please take a few minutes to complete the written survey that you may receive in the mail after your visit with us. Thank you!             Your Updated Medication List - Protect others around you: Learn how to safely use, store and throw away your medicines at www.disposemymeds.org.          This list is accurate as of: 2/1/17 10:13 AM.  Always use your most recent med list.                   Brand Name Dispense Instructions for use    amLODIPine 10 MG tablet    NORVASC    90 tablet    Take 1 tablet (10 mg) by mouth daily       lisinopril 5 MG tablet    PRINIVIL/ZESTRIL    90 tablet    Take 1 tablet (5 mg) by mouth daily       magnesium oxide 400 (241.3 MG) MG tablet    MAG-OX    120 tablet    Take 2 tablets (800 mg) by mouth 2 times daily       mycophenolate 250 MG capsule    CELLCEPT - GENERIC EQUIVALENT    180 capsule    Take 3 capsules (750 mg) by mouth 2 times daily       sulfamethoxazole-trimethoprim 400-80 MG per tablet    BACTRIM/SEPTRA    30 tablet    Take 1 tablet by mouth daily       tacrolimus capsule     210 capsule    4 mg AM and 3 " mg PM       traMADol 50 MG tablet    ULTRAM    45 tablet    Take 1 tablet (50 mg) by mouth every 8 hours as needed for moderate pain       vitamin D 2000 UNITS tablet     100 tablet    Take 2,000 Units by mouth daily

## 2017-02-01 NOTE — TELEPHONE ENCOUNTER
Pt has PTLD, EBV positive. Discussed with Dr Sidhu regarding changing the goal prograf range. Per Dr Sidhu patient can decrease goal to around 6. Pt may have to come off of prograf in the future depending on the chemo regimen. Will discuss once staging is completed.   Pt will decrease prograf to 3 mg BID. Pt repeated dose change.

## 2017-02-01 NOTE — NURSING NOTE
"Cricket Chen is a 36 year old male who presents for:  Chief Complaint   Patient presents with     Oncology Clinic Visit     EBV POS, PTLD concern        Initial Vitals:  /79 mmHg  Pulse 74  Temp(Src) 96.8  F (36  C) (Tympanic)  Ht 1.867 m (6' 1.5\")  Wt 89.903 kg (198 lb 3.2 oz)  BMI 25.79 kg/m2  SpO2 100% Estimated body mass index is 25.79 kg/(m^2) as calculated from the following:    Height as of this encounter: 1.867 m (6' 1.5\").    Weight as of this encounter: 89.903 kg (198 lb 3.2 oz).. Body surface area is 2.16 meters squared. BP completed using cuff size: regular  No Pain (0) No LMP for male patient. Allergies and medications reviewed.     Medications: Medication refills not needed today.  Pharmacy name entered into EPIC:    [facility] - [place], [place] - [address] [address] [phone]  [facility] - [place], [place] - [address]     Comments: per patient, med list is correct    7 minutes for nursing intake (face to face time)   Telma Caceres CMA          "

## 2017-02-01 NOTE — Clinical Note
2/1/2017       RE: Cricket Chen  [address] [facility] [address]     Dear Colleague,    Thank you for referring your patient, Cricket Chen, to the [facility]. Please see a copy of my visit note below.    CHIEF COMPLAINT:  Post-transplant lymphoproliferative disorder, monomorphic diffuse large B-cell lymphoma.      HISTORY OF PRESENT ILLNESS:  The patient is a 36-year-old man referred by Dr. Beka Soto for consultation regarding a newly diagnosed lymphoma.  The patient is status post a liver and kidney transplant on 05/11/2016.  He noticed in January that he had some swollen lymph glands on his right neck and was referred to Dr. Soto.  Initially they tried doing a needle aspirate, but that was nondiagnostic, so he underwent excisional lymph node biopsy on 01/26/2017 which showed findings consistent with post-transplant lymphoproliferative disorder, monomorphic diffuse large B-cell lymphoma.  The flow cytometry showed 12% kappa monotypic B-cells and the chromosome analysis/FISH is pending.       The patient says he has not noted adenopathy elsewhere.  He thinks that the lymph nodes are a bit bigger since his surgery on 01/26.  He is a bit tender from the incision site.  He has no pain elsewhere.  He says he has had a few sweats at night, but not enough to make him change his pajamas or sheets.  His energy is good.  His weight is stable.  He has no pruritus.  He has had a headache on the right side that is not too bothersome over the past week.       PAST MEDICAL HISTORY:   1.  Status post liver/kidney transplant in 05/11/2016.  He had alcoholic cirrhosis.  I am not sure the cause of his kidney disease.  He was on dialysis for 2 years.   2.  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  He has a chronic dilated right ventricle with an RVEF of 30-35%, though LV has been normal.    3.  Atrial flutter.    4.  History of GI bleed.      MEDICATIONS:   1.  Vitamin D 2000 units daily.   2.  Tramadol 50 mg q.8 h. p.r.n.   3.  Magnesium oxide 800 mg b.i.d.   4.  Prograf 4 mg q.a.m., 3 mg q.p.m.    5.  Amlodipine 10 mg daily.   6.  Lisinopril 5 mg daily.   7.  Mycophenolate 750 mg b.i.d.   8.  Bactrim 1 tablet daily.      FAMILY HISTORY:  Significant for brother and sister with hypertension, also mother and father with hypertension.  Father has arthritis.       SOCIAL HISTORY:  He is single.  He is living with his parents right now and not working.  He no longer drinks.  He has a history of smoking in high school but quit many years ago.       REVIEW OF SYSTEMS:  As in the HPI and below.   Answers for HPI/ROS submitted by the patient on 1/29/2017   General Symptoms: No  Skin Symptoms: Yes  HENT Symptoms: No  EYE SYMPTOMS: No  HEART SYMPTOMS: No  LUNG SYMPTOMS: No  INTESTINAL SYMPTOMS: No  URINARY SYMPTOMS: No  REPRODUCTIVE SYMPTOMS: No  SKELETAL SYMPTOMS: No  BLOOD SYMPTOMS: No  NERVOUS SYSTEM SYMPTOMS: No  MENTAL HEALTH SYMPTOMS: No  Changes in hair: No  Changes in moles/birth marks: No  Itching: Yes  Rashes: Yes  Changes in nails: No  Acne: No  Change in facial hair: No  Warts: No  Non-healing sores: No  Scarring: No  Flaking of skin: No  Color changes of hands/feet in cold : No  Sun sensitivity: No  Skin thickening: No    PHYSICAL EXAMINATION:   GENERAL:  A 36-year-old man in no acute distress.    VITAL SIGNS:  Blood pressure is 134/79, pulse 74, temp 96.8, O2 sat 100% on room air,    height 186.7 cm, weight 89.9 kg.    HEENT:  He has very obvious swelling in his right neck.  Oropharynx is normal.  Dentition in good repair.  His tonsils are visible but not enlarged.     LYMPH:  In the neck he has large, matted, at least 3 x 3 lymph nodes with a healing incision.  No lymph nodes on the left.  No supraclavicular, axillary or inguinal adenopathy.   LUNGS:  Clear.   CARDIAC:  Regular rate and rhythm.  No S3, S4 or murmur  appreciated.   ABDOMEN:  Well-healed chevron and right lower quadrant surgical scars.  Positive bowel sounds.  Nontender.  No hepatomegaly and no splenomegaly appreciated.   EXTREMITIES:  He has trace edema in both ankles, right slightly more than left.   SKIN:  He has multiple tattoos (none are new within the past year).  No rash, petechiae or ecchymoses.       LABORATORY DATA:  Laboratory data today is pending.       ASSESSMENT AND RECOMMENDATIONS:  This is a 36-year-old man with an EBV-positive post-transplant lymphoproliferative disorder.  I do not know his stage yet.  I discussed with him about the diagnosis and that this occasionally happens in patients who have had a transplant and is driven by the Abelino-Barr virus and the immunosuppression.  The first step is to do some staging which will include labs including CBC, comprehensive metabolic panel, coags, LD, uric acid, beta-2 microglobulin, quantitative immunoglobulins, and SPEP.  He will need a bone marrow biopsy and then a PET scan with chest, abdomen and pelvis CT.  This will help with prognosis and in managing care going forward.  He has had fairly recent hepatitis B, hepatitis C and HIV which are negative, so he does not need to have those rechecked at this time.       I do not have the cytogenetics back yet on the specimen,  although I will be surprised if it is a double-hit type of lymphoma, although it is not impossible.  Our standard management protocol involves giving rituximab weekly x4 and restaging.  If there is not a complete response, then treatment with  R-CHOP would be most likely, unless there is a research protocol.  I discussed a bit about rituximab with him.  Importantly, I discussed that the goal of our care is to cure the lymphoma and that it is curable.  The rituximab can cause infusion reactions and on rare occasions PML, but that is very rare.       Also, as an initial step before starting treatment we usually like to decrease the  immunosuppression if at all able.  I will be in contact with Dr. Sidhu about that and see if it is possible to decrease his doses some.  Especially once he starts rituximab, the rituximab itself will act as an immunosuppressant so we can usually decrease the other medications.       Also going forward, if he does need an Adriamycin-containing regimen, he does have this cardiac history.  It appears that his ejection fraction is okay, but if that comes to pass I will be in contact with his cardiologist, Dr. Calvo at [facility] in [place].       I will have him return the week of 02/13 to go over all the results and make a final plan to start treatment.   Ileana Reddy MD  Hematology

## 2017-02-01 NOTE — PROGRESS NOTES
CHIEF COMPLAINT:  Post-transplant lymphoproliferative disorder, monomorphic diffuse large B-cell lymphoma.      HISTORY OF PRESENT ILLNESS:  The patient is a 36-year-old man referred by Dr. eBka Soto for consultation regarding a newly diagnosed lymphoma.  The patient is status post a liver and kidney transplant on 05/11/2016.  He noticed in January that he had some swollen lymph glands on his right neck and was referred to Dr. Soto.  Initially they tried doing a needle aspirate, but that was nondiagnostic, so he underwent excisional lymph node biopsy on 01/26/2017 which showed findings consistent with post-transplant lymphoproliferative disorder, monomorphic diffuse large B-cell lymphoma.  The flow cytometry showed 12% kappa monotypic B-cells and the chromosome analysis/FISH is pending.       The patient says he has not noted adenopathy elsewhere.  He thinks that the lymph nodes are a bit bigger since his surgery on 01/26.  He is a bit tender from the incision site.  He has no pain elsewhere.  He says he has had a few sweats at night, but not enough to make him change his pajamas or sheets.  His energy is good.  His weight is stable.  He has no pruritus.  He has had a headache on the right side that is not too bothersome over the past week.       PAST MEDICAL HISTORY:   1.  Status post liver/kidney transplant in 05/11/2016.  He had alcoholic cirrhosis.  I am not sure the cause of his kidney disease.  He was on dialysis for 2 years.   2.  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  He has a chronic dilated right ventricle with an RVEF of 30-35%, though LV has been normal.    3.  Atrial flutter.   4.  History of GI bleed.      MEDICATIONS:   1.  Vitamin D 2000 units daily.   2.  Tramadol 50 mg q.8 h. p.r.n.   3.  Magnesium oxide 800 mg b.i.d.   4.  Prograf 4 mg q.a.m., 3 mg q.p.m.    5.  Amlodipine 10 mg daily.   6.  Lisinopril 5 mg daily.    7.  Mycophenolate 750 mg b.i.d.   8.  Bactrim 1 tablet daily.      FAMILY HISTORY:  Significant for brother and sister with hypertension, also mother and father with hypertension.  Father has arthritis.       SOCIAL HISTORY:  He is single.  He is living with his parents right now and not working.  He no longer drinks.  He has a history of smoking in high school but quit many years ago.       REVIEW OF SYSTEMS:  As in the HPI and below.   Answers for HPI/ROS submitted by the patient on 1/29/2017   General Symptoms: No  Skin Symptoms: Yes  HENT Symptoms: No  EYE SYMPTOMS: No  HEART SYMPTOMS: No  LUNG SYMPTOMS: No  INTESTINAL SYMPTOMS: No  URINARY SYMPTOMS: No  REPRODUCTIVE SYMPTOMS: No  SKELETAL SYMPTOMS: No  BLOOD SYMPTOMS: No  NERVOUS SYSTEM SYMPTOMS: No  MENTAL HEALTH SYMPTOMS: No  Changes in hair: No  Changes in moles/birth marks: No  Itching: Yes  Rashes: Yes  Changes in nails: No  Acne: No  Change in facial hair: No  Warts: No  Non-healing sores: No  Scarring: No  Flaking of skin: No  Color changes of hands/feet in cold : No  Sun sensitivity: No  Skin thickening: No    PHYSICAL EXAMINATION:   GENERAL:  A 36-year-old man in no acute distress.    VITAL SIGNS:  Blood pressure is 134/79, pulse 74, temp 96.8, O2 sat 100% on room air,    height 186.7 cm, weight 89.9 kg.    HEENT:  He has very obvious swelling in his right neck.  Oropharynx is normal.  Dentition in good repair.  His tonsils are visible but not enlarged.     LYMPH:  In the neck he has large, matted, at least 3 x 3 lymph nodes with a healing incision.  No lymph nodes on the left.  No supraclavicular, axillary or inguinal adenopathy.   LUNGS:  Clear.   CARDIAC:  Regular rate and rhythm.  No S3, S4 or murmur appreciated.   ABDOMEN:  Well-healed chevron and right lower quadrant surgical scars.  Positive bowel sounds.  Nontender.  No hepatomegaly and no splenomegaly appreciated.   EXTREMITIES:  He has trace edema in both ankles, right slightly more than  left.   SKIN:  He has multiple tattoos (none are new within the past year).  No rash, petechiae or ecchymoses.       LABORATORY DATA:  Laboratory data today is pending.       ASSESSMENT AND RECOMMENDATIONS:  This is a 36-year-old man with an EBV-positive post-transplant lymphoproliferative disorder.  I do not know his stage yet.  I discussed with him about the diagnosis and that this occasionally happens in patients who have had a transplant and is driven by the Abelino-Barr virus and the immunosuppression.  The first step is to do some staging which will include labs including CBC, comprehensive metabolic panel, coags, LD, uric acid, beta-2 microglobulin, quantitative immunoglobulins, and SPEP.  He will need a bone marrow biopsy and then a PET scan with chest, abdomen and pelvis CT.  This will help with prognosis and in managing care going forward.  He has had fairly recent hepatitis B, hepatitis C and HIV which are negative, so he does not need to have those rechecked at this time.       I do not have the cytogenetics back yet on the specimen,  although I will be surprised if it is a double-hit type of lymphoma, although it is not impossible.  Our standard management protocol involves giving rituximab weekly x4 and restaging.  If there is not a complete response, then treatment with  R-CHOP would be most likely, unless there is a research protocol.  I discussed a bit about rituximab with him.  Importantly, I discussed that the goal of our care is to cure the lymphoma and that it is curable.  The rituximab can cause infusion reactions and on rare occasions PML, but that is very rare.       Also, as an initial step before starting treatment we usually like to decrease the immunosuppression if at all able.  I will be in contact with Dr. Sidhu about that and see if it is possible to decrease his doses some.  Especially once he starts rituximab, the rituximab itself will act as an immunosuppressant so we can usually  decrease the other medications.       Also going forward, if he does need an Adriamycin-containing regimen, he does have this cardiac history.  It appears that his ejection fraction is okay, but if that comes to pass I will be in contact with his cardiologist, Dr. Calvo at [facility] in [place].       I will have him return the week of 02/13 to go over all the results and make a final plan to start treatment.   Ileana Reddy MD  Hematology

## 2017-02-02 ENCOUNTER — TELEPHONE (OUTPATIENT)
Dept: TRANSPLANT | Facility: CLINIC | Age: 37
End: 2017-02-02

## 2017-02-02 DIAGNOSIS — Z94.4 LIVER REPLACED BY TRANSPLANT (H): Primary | ICD-10-CM

## 2017-02-02 LAB
ALBUMIN SERPL ELPH-MCNC: 4.5 G/DL (ref 3.7–5.1)
ALPHA1 GLOB SERPL ELPH-MCNC: 0.4 G/DL (ref 0.2–0.4)
ALPHA2 GLOB SERPL ELPH-MCNC: 0.9 G/DL (ref 0.5–0.9)
B-GLOBULIN SERPL ELPH-MCNC: 0.8 G/DL (ref 0.6–1)
B2 MICROGLOB SERPL-MCNC: 3.1 MG/L
GAMMA GLOB SERPL ELPH-MCNC: 0.8 G/DL (ref 0.7–1.6)
IGA SERPL-MCNC: 150 MG/DL (ref 70–380)
IGG SERPL-MCNC: 776 MG/DL (ref 695–1620)
IGM SERPL-MCNC: 82 MG/DL (ref 60–265)
M PROTEIN SERPL ELPH-MCNC: 0 G/DL
PROT PATTERN SERPL ELPH-IMP: NORMAL
PROT PATTERN SERPL IFE-IMP: NORMAL

## 2017-02-02 RX ORDER — MYCOPHENOLATE MOFETIL 250 MG/1
250 CAPSULE ORAL 2 TIMES DAILY
Qty: 60 CAPSULE | Refills: 11 | Status: SHIPPED | OUTPATIENT
Start: 2017-02-02 | End: 2017-04-05

## 2017-02-02 NOTE — TELEPHONE ENCOUNTER
Per DR Sidhu patient to decrease his mycophenolate mofetil down to 250 mg bid and target tacrolimus levels 4-6.  Once he starts R-CHOP, will (at least CHOP part) would stop both tacrolimus and mycophenolate mofetil and put him on prednisone 10 mg daily until he is done with chemo.     Patient is aware of changing cellcept to 250 mg BID.

## 2017-02-03 ENCOUNTER — OFFICE VISIT (OUTPATIENT)
Dept: OTOLARYNGOLOGY | Facility: CLINIC | Age: 37
End: 2017-02-03

## 2017-02-03 VITALS — BODY MASS INDEX: 28.06 KG/M2 | HEIGHT: 70 IN | WEIGHT: 196 LBS

## 2017-02-03 DIAGNOSIS — R29.898 DECREASED RANGE OF MOTION OF NECK: Primary | ICD-10-CM

## 2017-02-03 DIAGNOSIS — R59.1 LYMPHADENOPATHY: ICD-10-CM

## 2017-02-03 ASSESSMENT — PAIN SCALES - GENERAL: PAINLEVEL: NO PAIN (0)

## 2017-02-03 NOTE — NURSING NOTE
Chief Complaint   Patient presents with     RECHECK     Return Post op F/U 1/26/2017     Pt states no pain today.    BARBARA Braga LPN

## 2017-02-03 NOTE — Clinical Note
2/3/2017       RE: Cricket Chen  [address] [facility] [address]     Dear Colleague,    Thank you for referring your patient, Cricket Chen, to the [facility] EAR NOSE AND THROAT at [facility]. Please see a copy of my visit note below.    February 3, 2017    HISTORY OF PRESENT ILLNESS:  Mr. Chen is a delightful 36-year-old post-transplant patient who we brought to the operating room last week for an excisional biopsy of two large neck nodes.  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  I gave him this news last week.  He has already seen Dr. Reddy.  He is feeling good because she told him that this was very curable.  He has a wonderful outlook.     He notes that a couple of days after the operation, the lymphadenopathy again developed so that the neck is almost like it was before.  He, of course, does know that the mass had been removed in the interim, and the lymph nodes have swollen again.  He actually reports that the lymph nodes were so inflamed that two days ago they were even more prominent.  It is looking better now.      He himself has no complaints, and his shoulder feels completely normal.  He says he has good strength.    PHYSICAL EXAMINATION:  His neck incision is healing very well. There is prominent adenopathy again on the right.  There is no weakness to his seventh nerve.  His right shoulder seems to be positioned a little bit more forward than on the left side but that may be normal symmetry.  He is able to adduct his arms just fine.  The tongue is normal as well.      IMPRESSION AND PLAN:  Mr. Chen, unfortunately, has post-transplant lymphoproliferative disease.  Dr. Reddy will begin treatment after bone marrow testing as well as PET testing this week.      I would like him to follow through with shoulder physical therapy just in case the nerve is a little bruised.   Otherwise, he is doing well.  Lorenza will remove the sutures and he can come back if he has any difficulty at all.        Beka Soto M.D.  Otolaryngology/Head & Neck Surgery  [phone]         Antoinette Galvan MD    [facility] 235       Jake Sidhu MD    Mail Code [id number]       Eugenia Perez MD    [facility] 648

## 2017-02-03 NOTE — MR AVS SNAPSHOT
After Visit Summary   2/3/2017    Cricket Chen    MRN: [id number]           Patient Information     Date Of Birth          1980        Visit Information        Provider Department      2/3/2017 2:50 PM Beka Soto MD [facility] Ear Nose and Throat        Care Instructions    Incision Care Instructions:    -Use a small amount of Aquaphor to incision daily and as needed. You may stop this after 3-4 weeks after surgery but can continue to use it if incision become itchy and dry or if scabbing is present.    -Starting 3-4 weeks after surgery, you may start to massage vitamin E oil to incision daily. You can use over the counter vitamin E capsules found at any drug store by taking a capsule, cutting of top and squeezing oil onto fingertip to massage into incision. We recommend using pure oil versus a lotion containing vitamin E.    -Make sure that you keep the incision out of the sun for at least the first year after surgery. Using zinc oxide on incision or reapplying high SPF frequently is the best way of protecting your incision from the sun. The incision is 'new skin' and is more prone to burning and scarring darker if not protected.                Follow-ups after your visit        Your next 10 appointments already scheduled     Feb 04, 2017 10:30 AM   PE NPET ONCOLOGY (EYES TO THIGHS) with UUPET1   [facility] [place] PET CT ([facility], [facility])    [address] [address] [address] [phone]           Tell your doctor:   If there is any chance you may be pregnant or if you are breastfeeding.   If you have problems lying in small spaces (claustrophobia). If you do, your doctor may give you medicine to help you relax. If you have diabetes:   Have your exam early in the morning. Your blood glucose will go up as the day goes by.   Your glucose level must be 180 or less at the start of the exam. Please take any medicines you need to ensure this blood  glucose level. 24 hours before your scan: Don t do any heavy exercise. (No jogging, aerobics or other workouts.) Exercise will make your pictures less accurate. 6 hours before your scan:   Stop all food and liquids (except water).   Do not chew gum or suck on mints.   If you need to take medicine with food, you may take it with a few crackers.  Please call your Imaging Department at your exam site with any questions.            Feb 07, 2017  9:00 AM   [facility] Lab Draw with  [facility] LAB DRAW   [facility] Lab Draw ([facility])    [address] [place] [id number] [phone]            Feb 07, 2017  9:30 AM   (Arrive by 9:15 AM)   BONE MARROW BIOPSY with Lita Velez PA-C,  BONE MARROW BIOPSY   [facility] Cancer [facility] ([facility])    [address] [place] [id number] [phone]           You may eat and drink prior to the procedure. You will have labs drawn prior to the procedure. You will be awake for the procedure. You will need a  to take you home. Please talk to your doctor about when to stop taking blood thinning medications. Please call our triage nurses with any questions that you may have at [phone].            Feb 15, 2017  9:00 AM   (Arrive by 8:45 AM)   Return Visit with Ileana Reddy MD   [facility] Cancer [facility] ([facility])    [address] [place] [id number] [phone]            Apr 04, 2017 12:10 PM   (Arrive by 11:55 AM)   Return General Liver with Laureen Galvan MD   [facility] Hepatology ([facility])    [address] [place] [address]  3rd [facility] [id number] [phone]            Apr 18, 2017 10:30 AM   (Arrive by 10:00 AM)   Return Kidney Transplant with Jake Sidhu MD   [facility] Nephrology ([facility])    [facility]  "[address] [id number] [phone]              Who to contact     Please call your clinic at [phone] to:    Ask questions about your health    Make or cancel appointments    Discuss your medicines    Learn about your test results    Speak to your doctor   If you have compliments or concerns about an experience at your clinic, or if you wish to file a complaint, please contact [facility] Patient Relations at [phone] or email us at [email or web address]         Additional Information About Your Visit        [employer] Information     [employer] gives you secure access to your electronic health record. If you see a primary care provider, you can also send messages to your care team and make appointments. If you have questions, please call your primary care clinic.  If you do not have a primary care provider, please call [phone] and they will assist you.      [employer] is an electronic gateway that provides easy, online access to your medical records. With [employer], you can request a clinic appointment, read your test results, renew a prescription or communicate with your care team.     To access your existing account, please contact your [facility] or call [phone] for assistance.        Care EveryWhere ID     This is your Care EveryWhere ID. This could be used by other organizations to access your [place] medical records  [id number]        Your Vitals Were     Height BMI (Body Mass Index)                1.79 m (5' 10.47\") 27.75 kg/m2           Blood Pressure from Last 3 Encounters:   02/01/17 134/79   01/26/17 143/86   01/24/17 123/80    Weight from Last 3 Encounters:   02/03/17 88.905 kg (196 lb)   02/01/17 89.903 kg (198 lb 3.2 oz)   01/26/17 89.4 kg (197 lb 1.5 oz)              Today, you had the following     No orders found for display       Primary Care Provider Office Phone # Fax #    Eugenia " Katerina Perez [phone]       [address]  [facility] [address]        Thank you!     Thank you for choosing [facility] EAR NOSE AND THROAT  for your care. Our goal is always to provide you with excellent care. Hearing back from our patients is one way we can continue to improve our services. Please take a few minutes to complete the written survey that you may receive in the mail after your visit with us. Thank you!             Your Updated Medication List - Protect others around you: Learn how to safely use, store and throw away your medicines at www.disposemymeds.org.          This list is accurate as of: 2/3/17  3:25 PM.  Always use your most recent med list.                   Brand Name Dispense Instructions for use    amLODIPine 10 MG tablet    NORVASC    90 tablet    Take 1 tablet (10 mg) by mouth daily       lisinopril 5 MG tablet    PRINIVIL/ZESTRIL    90 tablet    Take 1 tablet (5 mg) by mouth daily       magnesium oxide 400 (241.3 MG) MG tablet    MAG-OX    120 tablet    Take 2 tablets (800 mg) by mouth 2 times daily       mycophenolate 250 MG capsule    CELLCEPT - GENERIC EQUIVALENT    60 capsule    Take 1 capsule (250 mg) by mouth 2 times daily       sulfamethoxazole-trimethoprim 400-80 MG per tablet    BACTRIM/SEPTRA    30 tablet    Take 1 tablet by mouth daily       tacrolimus capsule     180 capsule    Take 3 capsules (3 mg) by mouth 2 times daily       traMADol 50 MG tablet    ULTRAM    45 tablet    Take 1 tablet (50 mg) by mouth every 8 hours as needed for moderate pain       vitamin D 2000 UNITS tablet     100 tablet    Take 2,000 Units by mouth daily

## 2017-02-03 NOTE — PROGRESS NOTES
February 3, 2017    HISTORY OF PRESENT ILLNESS:  Mr. Chen is a delightful 36-year-old post-transplant patient who we brought to the operating room last week for an excisional biopsy of two large neck nodes.  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  I gave him this news last week.  He has already seen Dr. Reddy.  He is feeling good because she told him that this was very curable.  He has a wonderful outlook.     He notes that a couple of days after the operation, the lymphadenopathy again developed so that the neck is almost like it was before.  He, of course, does know that the mass had been removed in the interim, and the lymph nodes have swollen again.  He actually reports that the lymph nodes were so inflamed that two days ago they were even more prominent.  It is looking better now.      He himself has no complaints, and his shoulder feels completely normal.  He says he has good strength.    PHYSICAL EXAMINATION:  His neck incision is healing very well. There is prominent adenopathy again on the right.  There is no weakness to his seventh nerve.  His right shoulder seems to be positioned a little bit more forward than on the left side but that may be normal symmetry.  He is able to adduct his arms just fine.  The tongue is normal as well.      IMPRESSION AND PLAN:  Mr. Chen, unfortunately, has post-transplant lymphoproliferative disease.  Dr. Reddy will begin treatment after bone marrow testing as well as PET testing this week.      I would like him to follow through with shoulder physical therapy just in case the nerve is a little bruised.  Otherwise, he is doing well.  Lorenza will remove the sutures and he can come back if he has any difficulty at all.        Beka Soto M.D.  Otolaryngology/Head & Neck Surgery  [phone]             Antoinette Galvan MD    [facility] 164       Jake Sidhu MD    Mail Code [id number]       Eugenia Perez  MD    [facility] 028

## 2017-02-03 NOTE — PATIENT INSTRUCTIONS
Incision Care Instructions:    -Use a small amount of Aquaphor to incision daily and as needed. You may stop this after 3-4 weeks after surgery but can continue to use it if incision become itchy and dry or if scabbing is present.    -Starting 3-4 weeks after surgery, you may start to massage vitamin E oil to incision daily. You can use over the counter vitamin E capsules found at any drug store by taking a capsule, cutting of top and squeezing oil onto fingertip to massage into incision. We recommend using pure oil versus a lotion containing vitamin E.    -Make sure that you keep the incision out of the sun for at least the first year after surgery. Using zinc oxide on incision or reapplying high SPF frequently is the best way of protecting your incision from the sun. The incision is 'new skin' and is more prone to burning and scarring darker if not protected.

## 2017-02-04 ENCOUNTER — HOSPITAL ENCOUNTER (OUTPATIENT)
Dept: PET IMAGING | Facility: CLINIC | Age: 37
Discharge: HOME | End: 2017-02-04
Attending: INTERNAL MEDICINE | Admitting: INTERNAL MEDICINE
Payer: COMMERCIAL

## 2017-02-04 DIAGNOSIS — D47.Z1 POST-TRANSPLANT LYMPHOPROLIFERATIVE DISORDER (H): ICD-10-CM

## 2017-02-04 LAB
COPATH REPORT: NORMAL
GLUCOSE BLDC GLUCOMTR-MCNC: 101 MG/DL (ref 70–99)

## 2017-02-04 PROCEDURE — 82962 GLUCOSE BLOOD TEST: CPT

## 2017-02-04 PROCEDURE — 34300033 ZZH RX 343: Performed by: INTERNAL MEDICINE

## 2017-02-04 PROCEDURE — 40000556 ZZH STATISTIC PERIPHERAL IV START W US GUIDANCE

## 2017-02-04 PROCEDURE — 78816 PET IMAGE W/CT FULL BODY: CPT | Mod: PI

## 2017-02-04 PROCEDURE — 25500064 ZZH RX 255 OP 636: Performed by: INTERNAL MEDICINE

## 2017-02-04 PROCEDURE — A9552 F18 FDG: HCPCS | Performed by: INTERNAL MEDICINE

## 2017-02-04 PROCEDURE — 71260 CT THORAX DX C+: CPT

## 2017-02-04 RX ORDER — IOPAMIDOL 755 MG/ML
100 INJECTION, SOLUTION INTRAVASCULAR ONCE
Status: COMPLETED | OUTPATIENT
Start: 2017-02-04 | End: 2017-02-04

## 2017-02-04 RX ADMIN — FLUDEOXYGLUCOSE F-18 12.53 MCI.: 500 INJECTION, SOLUTION INTRAVENOUS at 11:12

## 2017-02-04 RX ADMIN — IOPAMIDOL 106 ML: 755 INJECTION, SOLUTION INTRAVENOUS at 12:24

## 2017-02-06 ENCOUNTER — TELEPHONE (OUTPATIENT)
Dept: ONCOLOGY | Facility: CLINIC | Age: 37
End: 2017-02-06

## 2017-02-06 NOTE — TELEPHONE ENCOUNTER
I discussed Mr. Chen's case with Dr. Narayanan, who has a special interest in PTLD.  We agree that it would be good if he took over Mr. Chen's care.  The patient is aggreeable with this lynnette.  Will try to arrange him to see Dr. Narayanan this week. Cancel appt with me  Ileana Reddy Md

## 2017-02-07 ENCOUNTER — OFFICE VISIT (OUTPATIENT)
Dept: ONCOLOGY | Facility: CLINIC | Age: 37
End: 2017-02-07
Attending: PHYSICIAN ASSISTANT
Payer: COMMERCIAL

## 2017-02-07 VITALS
TEMPERATURE: 97.7 F | SYSTOLIC BLOOD PRESSURE: 135 MMHG | RESPIRATION RATE: 16 BRPM | WEIGHT: 196.7 LBS | DIASTOLIC BLOOD PRESSURE: 80 MMHG | BODY MASS INDEX: 27.85 KG/M2 | OXYGEN SATURATION: 97 % | HEART RATE: 74 BPM

## 2017-02-07 DIAGNOSIS — D47.Z1 POST-TRANSPLANT LYMPHOPROLIFERATIVE DISORDER (H): Primary | ICD-10-CM

## 2017-02-07 LAB
BASOPHILS # BLD AUTO: 0 10E9/L (ref 0–0.2)
BASOPHILS NFR BLD AUTO: 0.4 %
DIFFERENTIAL METHOD BLD: ABNORMAL
EOSINOPHIL # BLD AUTO: 0.1 10E9/L (ref 0–0.7)
EOSINOPHIL NFR BLD AUTO: 1.5 %
ERYTHROCYTE [DISTWIDTH] IN BLOOD BY AUTOMATED COUNT: 12.8 % (ref 10–15)
HCT VFR BLD AUTO: 46.7 % (ref 40–53)
HGB BLD-MCNC: 15.5 G/DL (ref 13.3–17.7)
IMM GRANULOCYTES # BLD: 0 10E9/L (ref 0–0.4)
IMM GRANULOCYTES NFR BLD: 0.2 %
LYMPHOCYTES # BLD AUTO: 0.8 10E9/L (ref 0.8–5.3)
LYMPHOCYTES NFR BLD AUTO: 14.3 %
MCH RBC QN AUTO: 29.6 PG (ref 26.5–33)
MCHC RBC AUTO-ENTMCNC: 33.2 G/DL (ref 31.5–36.5)
MCV RBC AUTO: 89 FL (ref 78–100)
MONOCYTES # BLD AUTO: 0.3 10E9/L (ref 0–1.3)
MONOCYTES NFR BLD AUTO: 5.9 %
NEUTROPHILS # BLD AUTO: 4.2 10E9/L (ref 1.6–8.3)
NEUTROPHILS NFR BLD AUTO: 77.7 %
NRBC # BLD AUTO: 0 10*3/UL
NRBC BLD AUTO-RTO: 0 /100
PLATELET # BLD AUTO: 120 10E9/L (ref 150–450)
RADIOLOGIST FLAGS: NORMAL
RBC # BLD AUTO: 5.23 10E12/L (ref 4.4–5.9)
WBC # BLD AUTO: 5.5 10E9/L (ref 4–11)

## 2017-02-07 PROCEDURE — 00000058 ZZHCL STATISTIC BONE MARROW ASP PERF TC 38220: Performed by: INTERNAL MEDICINE

## 2017-02-07 PROCEDURE — 96374 THER/PROPH/DIAG INJ IV PUSH: CPT | Mod: 59

## 2017-02-07 PROCEDURE — 88237 TISSUE CULTURE BONE MARROW: CPT | Performed by: INTERNAL MEDICINE

## 2017-02-07 PROCEDURE — 88305 TISSUE EXAM BY PATHOLOGIST: CPT | Performed by: INTERNAL MEDICINE

## 2017-02-07 PROCEDURE — 88280 CHROMOSOME KARYOTYPE STUDY: CPT | Performed by: INTERNAL MEDICINE

## 2017-02-07 PROCEDURE — 88271 CYTOGENETICS DNA PROBE: CPT | Performed by: INTERNAL MEDICINE

## 2017-02-07 PROCEDURE — 88311 DECALCIFY TISSUE: CPT | Performed by: INTERNAL MEDICINE

## 2017-02-07 PROCEDURE — 25000125 ZZHC RX 250: Mod: ZF | Performed by: RADIOLOGY

## 2017-02-07 PROCEDURE — 88275 CYTOGENETICS 100-300: CPT | Performed by: INTERNAL MEDICINE

## 2017-02-07 PROCEDURE — 40000556 ZZH STATISTIC PERIPHERAL IV START W US GUIDANCE: Mod: ZF

## 2017-02-07 PROCEDURE — 25000128 H RX IP 250 OP 636: Mod: ZF | Performed by: PHYSICIAN ASSISTANT

## 2017-02-07 PROCEDURE — 40000795 ZZHCL STATISTIC DNA PROCESS AND HOLD: Performed by: INTERNAL MEDICINE

## 2017-02-07 PROCEDURE — 40000611 ZZHCL STATISTIC MORPHOLOGY W/INTERP HEMEPATH TC 85060: Performed by: INTERNAL MEDICINE

## 2017-02-07 PROCEDURE — 88264 CHROMOSOME ANALYSIS 20-25: CPT | Performed by: INTERNAL MEDICINE

## 2017-02-07 PROCEDURE — 88184 FLOWCYTOMETRY/ TC 1 MARKER: CPT | Performed by: INTERNAL MEDICINE

## 2017-02-07 PROCEDURE — 88161 CYTOPATH SMEAR OTHER SOURCE: CPT | Performed by: INTERNAL MEDICINE

## 2017-02-07 PROCEDURE — 88185 FLOWCYTOMETRY/TC ADD-ON: CPT | Performed by: INTERNAL MEDICINE

## 2017-02-07 PROCEDURE — 88313 SPECIAL STAINS GROUP 2: CPT | Performed by: INTERNAL MEDICINE

## 2017-02-07 PROCEDURE — 40000425 ZZHCL STATISTIC ADDL BM COR PERF TC 38221: Performed by: INTERNAL MEDICINE

## 2017-02-07 PROCEDURE — G0364 BONE MARROW ASPIRATE &BIOPSY: HCPCS | Mod: ZF | Performed by: PHYSICIAN ASSISTANT

## 2017-02-07 PROCEDURE — 00000161 ZZHCL STATISTIC H-SPHEME PROCESS B/S: Performed by: INTERNAL MEDICINE

## 2017-02-07 PROCEDURE — 40000951 ZZHCL STATISTIC BONE MARROW INTERP TC 85097: Performed by: INTERNAL MEDICINE

## 2017-02-07 PROCEDURE — 38221 DX BONE MARROW BIOPSIES: CPT | Mod: 50,ZF | Performed by: PHYSICIAN ASSISTANT

## 2017-02-07 PROCEDURE — 40000424 ZZHCL STATISTIC BONE MARROW CORE PERF TC 38221: Performed by: INTERNAL MEDICINE

## 2017-02-07 PROCEDURE — 85025 COMPLETE CBC W/AUTO DIFF WBC: CPT | Performed by: PHYSICIAN ASSISTANT

## 2017-02-07 RX ADMIN — LIDOCAINE HYDROCHLORIDE 0.5 ML: 10 INJECTION, SOLUTION INFILTRATION; PERINEURAL at 09:45

## 2017-02-07 RX ADMIN — MIDAZOLAM 1 MG: 1 INJECTION INTRAMUSCULAR; INTRAVENOUS at 09:57

## 2017-02-07 ASSESSMENT — PAIN SCALES - GENERAL: PAINLEVEL: NO PAIN (0)

## 2017-02-07 NOTE — PROGRESS NOTES
BMT ONC Adult Bone Marrow Biopsy Procedure Note  February 7, 2017  /80 mmHg  Pulse 74  Temp(Src) 97.7  F (36.5  C) (Oral)  Resp 16  Wt 89.223 kg (196 lb 11.2 oz)  SpO2 97%     Learning needs assessment complete within 12 months? YES    DIAGNOSIS: PTLD     PROCEDURE: Bilateral Bone Marrow Biopsy and Unilateral Aspirate    LOCATION: [facility] 2nd Floor    Patient s identification was positively verified by verbal identification and invasive procedure safety checklist was completed. Informed consent was obtained. Following the administration of 1mg Midazolam as pre-medication, patient was placed in the prone position and prepped and draped in a sterile manner. Approximately 22 cc of 1% Lidocaine was used over the bilateral posterior iliac spine. Following this a 3 mm incision was made. Trephine bone marrow core(s) was (were) obtained from the BP. Bone marrow aspirates were obtained from the IC. Aspirates were sent for morphology, immunophenotyping, cytogenetics and molecular diagnostics hold. A total of approximately 16 ml of marrow was aspirated. Following this procedure a sterile dressing was applied to the bone marrow biopsy site(s). The patient was placed in the supine position to maintain pressure on the biopsy site. Post-procedure wound care instructions were given.     Complications: none.     Post-procedural pain assessment: 0 out of 10 on the numeric pain rating scale.     Interventions: none needed.     Length of procedure:20 minutes or less      Procedure performed by: Lita Velez PA-C

## 2017-02-07 NOTE — PROGRESS NOTES
Drug Administration Record    Drug Name: Versed  Dose: 1mg  Route administered: IV  NDC #: [id number]  Amount of waste(mL):1ml  Reason for waste: As per MD, 1-2 mg ordered.  1mg administered.

## 2017-02-07 NOTE — PROGRESS NOTES
BMT Teaching Flowsheet    Cricket Chen is a 36 year old male  The encounter diagnosis was Post-transplant lymphoproliferative disorder (H).    Teaching Topic: bone marrow biopsy    Person(s) involved in teaching: Patient  Motivation Level  Asks Questions: Yes  Eager to Learn: Yes  Cooperative: yes  Receptive (willing/able to accept information): Yes  Any cultural factors/[employer] beliefs that may influence understanding or compliance? No    Patient demonstrates understanding of the following:  - Reason for the appointment, diagnosis and treatment plan: Yes  - Knowledge of proper use of medications and conditions for which they are ordered (with special attention to potential side effects or drug interactions): Yes  - Which situations necessitate calling provider and whom to contact: Yes    Teaching concerns addressed: pain management, site care, activity level    Time spent with patient: 30 minutes.    Specific Concerns: No, explain: Patient received 1mg IV Versed.  Tolerated well. VSS post procedure.  Site is covered, dry and intact.  Patient reports no pain post procedure.

## 2017-02-07 NOTE — Clinical Note
2/7/2017       RE: Cricket Chen  [address] [address]     Dear Colleague,    Thank you for referring your patient, Cricket Chen, to the [facility]. Please see a copy of my visit note below.    No notes on file    Again, thank you for allowing me to participate in the care of your patient.      Sincerely,    Lita Velez PA-C

## 2017-02-08 LAB
COPATH REPORT: NORMAL
COPATH REPORT: NORMAL

## 2017-02-09 ENCOUNTER — OFFICE VISIT (OUTPATIENT)
Dept: TRANSPLANT | Facility: CLINIC | Age: 37
End: 2017-02-09
Attending: INTERNAL MEDICINE
Payer: COMMERCIAL

## 2017-02-09 VITALS
TEMPERATURE: 97.6 F | RESPIRATION RATE: 16 BRPM | BODY MASS INDEX: 27.9 KG/M2 | WEIGHT: 197.09 LBS | SYSTOLIC BLOOD PRESSURE: 120 MMHG | OXYGEN SATURATION: 100 % | HEART RATE: 66 BPM | DIASTOLIC BLOOD PRESSURE: 72 MMHG

## 2017-02-09 DIAGNOSIS — Z94.0 S/P KIDNEY TRANSPLANT: ICD-10-CM

## 2017-02-09 DIAGNOSIS — D47.Z1 POST-TRANSPLANT LYMPHOPROLIFERATIVE DISORDER (H): Primary | ICD-10-CM

## 2017-02-09 DIAGNOSIS — E04.1 THYROID NODULE: ICD-10-CM

## 2017-02-09 DIAGNOSIS — Z94.4 LIVER REPLACED BY TRANSPLANT (H): ICD-10-CM

## 2017-02-09 DIAGNOSIS — Z94.4 S/P LIVER TRANSPLANT (H): ICD-10-CM

## 2017-02-09 DIAGNOSIS — C83.398 DIFFUSE LARGE B-CELL LYMPHOMA OF EXTRANODAL SITE: ICD-10-CM

## 2017-02-09 DIAGNOSIS — Z79.899 OTHER LONG TERM (CURRENT) DRUG THERAPY: ICD-10-CM

## 2017-02-09 LAB
ALBUMIN SERPL-MCNC: 4.5 G/DL (ref 3.4–5)
ALP SERPL-CCNC: 122 U/L (ref 40–150)
ALT SERPL W P-5'-P-CCNC: 26 U/L (ref 0–70)
ANION GAP SERPL CALCULATED.3IONS-SCNC: 9 MMOL/L (ref 3–14)
AST SERPL W P-5'-P-CCNC: 12 U/L (ref 0–45)
B2 MICROGLOB SERPL-MCNC: 3 MG/L
BILIRUB DIRECT SERPL-MCNC: 0.1 MG/DL (ref 0–0.2)
BILIRUB SERPL-MCNC: 0.4 MG/DL (ref 0.2–1.3)
BUN SERPL-MCNC: 15 MG/DL (ref 7–30)
CALCIUM SERPL-MCNC: 9.2 MG/DL (ref 8.5–10.1)
CHLORIDE SERPL-SCNC: 108 MMOL/L (ref 94–109)
CO2 SERPL-SCNC: 24 MMOL/L (ref 20–32)
CREAT SERPL-MCNC: 1.23 MG/DL (ref 0.66–1.25)
ERYTHROCYTE [DISTWIDTH] IN BLOOD BY AUTOMATED COUNT: 12.8 % (ref 10–15)
GFR SERPL CREATININE-BSD FRML MDRD: 66 ML/MIN/1.7M2
GLUCOSE SERPL-MCNC: 106 MG/DL (ref 70–99)
HBV CORE AB SERPL QL IA: NONREACTIVE
HBV SURFACE AG SERPL QL IA: NONREACTIVE
HCT VFR BLD AUTO: 44.9 % (ref 40–53)
HCV AB SERPL QL IA: NORMAL
HGB BLD-MCNC: 14.9 G/DL (ref 13.3–17.7)
HIV 1+2 AB+HIV1 P24 AG SERPL QL IA: NORMAL
MAGNESIUM SERPL-MCNC: 2 MG/DL (ref 1.6–2.3)
MCH RBC QN AUTO: 29.8 PG (ref 26.5–33)
MCHC RBC AUTO-ENTMCNC: 33.2 G/DL (ref 31.5–36.5)
MCV RBC AUTO: 90 FL (ref 78–100)
PHOSPHATE SERPL-MCNC: 2.4 MG/DL (ref 2.5–4.5)
PLATELET # BLD AUTO: 105 10E9/L (ref 150–450)
POTASSIUM SERPL-SCNC: 4.4 MMOL/L (ref 3.4–5.3)
PROT SERPL-MCNC: 7.8 G/DL (ref 6.8–8.8)
RBC # BLD AUTO: 5 10E12/L (ref 4.4–5.9)
SODIUM SERPL-SCNC: 141 MMOL/L (ref 133–144)
TACROLIMUS BLD-MCNC: 7.6 UG/L (ref 5–15)
TME LAST DOSE: NORMAL H
WBC # BLD AUTO: 4.8 10E9/L (ref 4–11)

## 2017-02-09 PROCEDURE — 82232 ASSAY OF BETA-2 PROTEIN: CPT | Performed by: INTERNAL MEDICINE

## 2017-02-09 PROCEDURE — 36415 COLL VENOUS BLD VENIPUNCTURE: CPT | Performed by: INTERNAL MEDICINE

## 2017-02-09 PROCEDURE — 85027 COMPLETE CBC AUTOMATED: CPT | Performed by: INTERNAL MEDICINE

## 2017-02-09 PROCEDURE — 87389 HIV-1 AG W/HIV-1&-2 AB AG IA: CPT | Performed by: INTERNAL MEDICINE

## 2017-02-09 PROCEDURE — 83735 ASSAY OF MAGNESIUM: CPT | Performed by: INTERNAL MEDICINE

## 2017-02-09 PROCEDURE — 80197 ASSAY OF TACROLIMUS: CPT | Performed by: INTERNAL MEDICINE

## 2017-02-09 PROCEDURE — 80076 HEPATIC FUNCTION PANEL: CPT | Performed by: INTERNAL MEDICINE

## 2017-02-09 PROCEDURE — 87340 HEPATITIS B SURFACE AG IA: CPT | Performed by: INTERNAL MEDICINE

## 2017-02-09 PROCEDURE — 86803 HEPATITIS C AB TEST: CPT | Performed by: INTERNAL MEDICINE

## 2017-02-09 PROCEDURE — 99212 OFFICE O/P EST SF 10 MIN: CPT | Mod: ZF

## 2017-02-09 PROCEDURE — 87799 DETECT AGENT NOS DNA QUANT: CPT | Performed by: INTERNAL MEDICINE

## 2017-02-09 PROCEDURE — 84100 ASSAY OF PHOSPHORUS: CPT | Performed by: INTERNAL MEDICINE

## 2017-02-09 PROCEDURE — 80321 ALCOHOLS BIOMARKERS 1OR 2: CPT | Performed by: INTERNAL MEDICINE

## 2017-02-09 PROCEDURE — 86704 HEP B CORE ANTIBODY TOTAL: CPT | Performed by: INTERNAL MEDICINE

## 2017-02-09 PROCEDURE — 80048 BASIC METABOLIC PNL TOTAL CA: CPT | Performed by: INTERNAL MEDICINE

## 2017-02-09 ASSESSMENT — PAIN SCALES - GENERAL: PAINLEVEL: NO PAIN (0)

## 2017-02-09 NOTE — MR AVS SNAPSHOT
After Visit Summary   2/9/2017    Cricket Chen    MRN: [id number]           Patient Information     Date Of Birth          1980        Visit Information        Provider Department      2/9/2017 8:30 AM Girish Narayanan MD [facility] Blood and Marrow Transplant        Today's Diagnoses     Post-transplant lymphoproliferative disorder (H)    -  1     Diffuse large B-cell lymphoma of extranodal site (H)               Clinics and Surgery Center ([facility])  [address] [place] [address]  Phone: [phone]  Clinic Hours:   Monday-Friday:    8am to 4:30pm   Weekends and holidays:    8am to noon (in general)  If your fever is 100.5  or greater,   call the clinic.  After hours call the   hospital at [phone] or   [phone]. Ask for the BMT   fellow for pediatric or adult patients            Follow-ups after your visit        Your next 10 appointments already scheduled     Feb 09, 2017 10:30 AM   LAB with  LAB   [facility] ([facility])    [address] [place] [address] [phone]           Patient must bring picture ID.  Patient should be prepared to give a urine specimen  Please do not eat 10-12 hours before your appointment if you are coming in fasting for labs on lipids, cholesterol, or glucose (sugar).  Pregnant women should follow their Care Team instructions. Water with medications is okay. Do not drink coffee or other fluids.   If you have concerns about taking  your medications, please ask at office or if scheduling via [email or web address], send a message by clicking on Secure Messaging, Message Your Care Team.            Feb 16, 2017 12:50 PM   (Arrive by 12:35 PM)   [facility] Spine with Lanie Fernández PT   [facility] ([facility])    [address] [place] [address]  5th [facility] [address] [phone]            Feb 17, 2017 10:00 AM   [facility] Lab Draw with  [facility] LAB DRAW   NANCI  [facility] Lab Draw ([facility])    [address]  2nd Floor  [facility] [address]   [phone]            Feb 17, 2017 10:30 AM   Infusion 360 with UC ONCOLOGY INFUSION, UC 21 ATC   [facility] ([facility])    [address]  2nd [facility] [address]   [phone]            Apr 04, 2017 12:10 PM   (Arrive by 11:55 AM)   Return General Liver with Laureen Galvan MD   [facility] Hepatology ([facility])    [address] [place] [address]  3rd [facility] [address]   [phone]            Apr 18, 2017 10:30 AM   (Arrive by 10:00 AM)   Return Kidney Transplant with Jake Sidhu MD   [facility] Nephrology ([facility])    [address] [place] [address]  3rd [facility] [address] [phone]              Who to contact     If you have questions or need follow up information about today's clinic visit or your schedule please contact [facility] BLOOD AND MARROW TRANSPLANT directly at [phone].  Normal or non-critical lab and imaging results will be communicated to you by MyChart, letter or phone within 4 business days after the clinic has received the results. If you do not hear from us within 7 days, please contact the clinic through Uskapehart or phone. If you have a critical or abnormal lab result, we will notify you by phone as soon as possible.  Submit refill requests through [employer] or call your pharmacy and they will forward the refill request to us. Please allow 3 business days for your refill to be completed.          Additional Information About Your Visit        HealthEngine Information     HealthEngine gives you secure access to your electronic health record. If you see a primary care provider, you can also send messages to your care team and make appointments. If you have questions, please call your primary care clinic.  If you do not have a  primary care provider, please call [phone] and they will assist you.        Care EveryWhere ID     This is your Care EveryWhere ID. This could be used by other organizations to access your [facility] medical records  [id number]        Your Vitals Were     Pulse Temperature Respirations Pulse Oximetry          66 97.6  F (36.4  C) 16 100%         Blood Pressure from Last 3 Encounters:   02/09/17 120/72   02/07/17 135/80   02/01/17 134/79    Weight from Last 3 Encounters:   02/09/17 89.4 kg (197 lb 1.5 oz)   02/07/17 89.223 kg (196 lb 11.2 oz)   02/03/17 88.905 kg (196 lb)              We Performed the Following     Beta 2 microglobulin     EBV DNA PCR Quantitative Whole Blood     Hepatitis B core antibody     Hepatitis B surface antigen     Hepatitis C antibody     HIV Antigen Antibody Combo        Recent Review Flowsheet Data     BMT Recent Results Latest Ref Rng 1/3/2017 1/6/2017 1/11/2017 1/23/2017 2/1/2017 2/4/2017 2/7/2017    WBC 4.0 - 11.0 10e9/L 4.5 3.9(L) - 5.2 3.5(L) - 5.5    Hemoglobin 13.3 - 17.7 g/dL 15.3 14.3 - 14.4 14.1 - 15.5    Platelet Count 150 - 450 10e9/L 101(L) 88(L) 96(L) 91(L) 108(L) - 120(L)    Neutrophils (Absolute) 1.6 - 8.3 10e9/L 3.5 - - - 2.5 - 4.2    INR 0.86 - 1.14 - - 1.14 - 0.99 - -    Sodium 133 - 144 mmol/L 140 - - 139 142 - -    Potassium 3.4 - 5.3 mmol/L 4.4 - - 4.2 4.7 - -    Chloride 94 - 109 mmol/L 108 - - 105 107 - -    Glucose 70 - 99 mg/dL 91 - - 94 84 101(H) -    Urea Nitrogen 7 - 30 mg/dL 18 - - 14 14 - -    Creatinine 0.66 - 1.25 mg/dL 1.3(H) - - 1.16 1.19 - -    Calcium (Total) 8.5 - 10.1 mg/dL 9.6 - - 9.4 9.0 - -    Protein (Total) 6.8 - 8.8 g/dL 8.1 - - 7.3 7.7 - -    Albumin 3.4 - 5.0 g/dL 4.6 - - 4.3 4.3 - -    Bilirubin (Direct) 0.0 - 0.2 mg/dL - - - 0.1 - - -    Alkaline Phosphatase 40 - 150 U/L 111 - - 103 150 - -    AST 0 - 45 U/L 17 - - 24 14 - -    ALT 0 - 70 U/L 24 - - 33 69 - -    MCV 78 - 100 fl 89 89 - 89 90 - 89               Primary Care Provider  Office Phone # Fax #    Eugenia Perez [phone]       [address]  [facility] [address]        Thank you!     Thank you for choosing [facility] BLOOD AND MARROW TRANSPLANT  for your care. Our goal is always to provide you with excellent care. Hearing back from our patients is one way we can continue to improve our services. Please take a few minutes to complete the written survey that you may receive in the mail after your visit with us. Thank you!             Your Updated Medication List - Protect others around you: Learn how to safely use, store and throw away your medicines at www.disposemymeds.org.          This list is accurate as of: 2/9/17 10:25 AM.  Always use your most recent med list.                   Brand Name Dispense Instructions for use    amLODIPine 10 MG tablet    NORVASC    90 tablet    Take 1 tablet (10 mg) by mouth daily       lisinopril 5 MG tablet    PRINIVIL/ZESTRIL    90 tablet    Take 1 tablet (5 mg) by mouth daily       magnesium oxide 400 (241.3 MG) MG tablet    MAG-OX    120 tablet    Take 2 tablets (800 mg) by mouth 2 times daily       mycophenolate 250 MG capsule    CELLCEPT - GENERIC EQUIVALENT    60 capsule    Take 1 capsule (250 mg) by mouth 2 times daily       sulfamethoxazole-trimethoprim 400-80 MG per tablet    BACTRIM/SEPTRA    30 tablet    Take 1 tablet by mouth daily       tacrolimus capsule     180 capsule    Take 3 capsules (3 mg) by mouth 2 times daily       traMADol 50 MG tablet    ULTRAM    45 tablet    Take 1 tablet (50 mg) by mouth every 8 hours as needed for moderate pain       vitamin D 2000 UNITS tablet     100 tablet    Take 2,000 Units by mouth daily

## 2017-02-09 NOTE — LETTER
2/9/2017       RE: Cricket Chen  [address] [facility] [address]     Dear Colleague,    Thank you for referring your patient, Cricket Chen, to the [facility] BLOOD AND MARROW TRANSPLANT at [facility]. Please see a copy of my visit note below.    REASON FOR VISIT:  Referral by Dr. Beka Soto and Dr. Ileana Reddy, as well as Dr. Jake Sidhu for management of recently diagnosed PTLD.      HISTORY OF PRESENT ILLNESS/REVIEW OF SYSTEMS:  Mr. Chen is a very pleasant 36-year-old gentleman with a prior history of end-stage liver disease and kidney disease, status post combined liver/kidney transplant back in 05/2016.  He was maintained on immunosuppression with tacrolimus and CellCept, and was feeling well up until 12/2016 when he started to experience excessive fatigue and had noticed the emergence of a lump in his right neck.  He was subsequently evaluated in January for persistent right neck lymphadenopathy and underwent a fine needle aspiration, which was largely nondiagnostic.  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  The patient reports persistent fatigue, but no recent weight loss, fevers or drenching night sweats.  He had noticed that the lump in the neck subsided after an excisional lymph node biopsy, but still persisted to some extent.      The patient also underwent reduction of immunosuppression with decrease in the dose of his tacrolimus from 4 mg in the morning and 3 mg in the evening to 3 mg b.i.d.  His CellCept was also decreased from 750 mg twice a day to 250 mg twice a day.      The patient was also seen for evaluation last week by my colleague, Dr. Ileana Reddy, and he underwent diagnostic PET scan and bone marrow biopsy.  His PET scan overall  demonstrated relatively low burden of disease with no PET-active area in the neck, except from the thyroid gland.  The patient was also found to have PET-avid right axillary lymphadenopathy measuring 1.3 cm.      There was no evidence of FDG-avid areas throughout the rest of his body.  He also underwent a diagnostic bone marrow biopsy, which was reported with no morphologic or immunophenotypic evidence of PTLD.        For the past couple of weeks he reports no new symptoms.  He remains active with no major restrictions in his regular activities of daily living.      He reports no abdominal pain, no nausea, vomiting or diarrhea.  The rest of 12 points of ROS were reviewed and found to be negative, unless as mentioned above.  He also denies any new neurologic symptoms.      His past medical history is notable for a history of heavy alcohol use with associated liver failure and possible hepatorenal syndrome requiring hemodialysis for about 2 years.  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      SOCIAL HISTORY:  Noticeable for approximately 13 years of very heavy alcohol use.  The patient has been sober since 2014.  He has used no tobacco in the past, and reports no use of intravenous drugs or any other occupational exposures.        FAMILY HISTORY:  He has 2 brothers and 1 sister.  He reports no history of malignancies in his first-degree relatives.      MEDICATIONS:  We reviewed his medications and reconciled the ongoing dose of his immunosuppressive meds that underwent reduction of immunosuppression.      PHYSICAL EXAMINATION:   VITAL SIGNS:  Reviewed in Epic.   GENERAL:  Not in acute distress, alert and oriented, well-nourished and hydrated.   HEENT:  Moist mucous membranes with no ulcerations.  Pupils  are equally round and reactive to light.  No conjunctival erythema or jaundice.   NECK:  Palpable mass in the right neck with a well-healed scar.  No marked lymphadenopathy in the axilla or groin, no palpable splenomegaly.   CARDIOVASCULAR:  Regular rate and rhythm.  A 3/6 systolic ejection murmur at the right sternal border.   PULMONARY:  Clear to auscultation bilaterally.   ABDOMEN:  Soft, nontender and nondistended.  Audible bowel sounds.   EXTREMITIES:  Trace ankle edema bilaterally.   NEUROLOGIC:  Grossly nonfocal with good gait and balance.      LABORATORY DATA:   LDH:  Within normal limits.   CBC:  Within normal limits with improving platelet trends within the past couple of months.   EBV:  Viral titer from beginning of January was in the 3000 range.   Prior screening for hep B and hep C were reported negative from last year around his solid organ transplant; pending from today.      IMAGING:  See HPI.      LABS/BIOPSY RESULTS:  See HPI.      ASSESSMENT AND PLAN:  This is a very pleasant 36-year-old gentleman with a history of combined liver/kidney transplant, who was recently found to have right neck lymphadenopathy, which was found to be consistent with monomorphic DLBCL/post-transplant lymphoproliferative disorder.  The patient presents for his initial appointment with me in consideration for initiation of anti-PTLD therapy.     - Post-transplant lymphoproliferative disorder/DLBCL:  We reviewed with the patient in detail his staging workup findings, including a PET/CT scan, which I personally reviewed and discussed with the patient in detail.  Altogether, the patient has been found to have monomorphic DLBCL (EBV-positive and most consistent with activated B-cell type by Tejinder criteria).  I reviewed with him in detail the natural history of PTLD with specific focus on natural hystory and  treatment of monomorphic PTLD.  Altogether, his staging is consistent with a stage II disease with possible involvement  of the neck and right axillary lymph nodes.  I explained to the patient that he also has an FDG-avid spot in his thyroid, which can certainly represent PTLD (and if so may reflect stage IV), but it can also reflect an unrelated thyroid malignancy.  Therefore, I recommended the patient to be evaluated by our Endocrinology colleagues with a dedicated ultrasound and possible FNA of the thyroid nodule.  In any event, our plan would be to proceed with 4 weekly infusions of rituximab according to risk stratified sequential therapy for his PTLD.  I explained to the patient that it will be important to obtain again screening for his hepatitis B, hep C, and HIV prior to initiation of therapy with rituximab.  He agreed with this plan.  The patient will continue his routine followup with the Liver and Kidney Transplant Teams.  We will plan on obtaining a CT scan 4 weeks after completion of weekly Rituxan infusions.  I explained to the patient that should he be found to have no residual lymphadenopathy/lesions, we will plan on proceeding with 4 more weekly rituximab infusions to complete his treatment course.  Otherwise, should he still had some evidence of persistent lymphadenopathy, we will consider proceeding with 4 cycles of R-CHOP.  The patient had an opportunity to ask multiple questions, all of which were answered to the best of my ability.  I agree with his currently reduced immunosuppression regimen.      I spent over 50% of this over an hour encounter in counseling and care coordination, and discussing and arranging his treatment plan as outlined above.      Girish Narayanan MD   Hematology, Oncology and Transplantation   [facility]

## 2017-02-09 NOTE — NURSING NOTE
BMT Heme Malignancy Rooming Note    Cricket Chen - 2/9/2017 8:28 AM     Chief Complaint   Patient presents with     RECHECK     Patient here for a return visit.         /72 mmHg  Pulse 66  Temp(Src) 97.6  F (36.4  C)  Resp 16  Wt 89.4 kg (197 lb 1.5 oz)  SpO2 100%     Medications reviewed: Yes    Labs drawn: No    Dressing changed: Not applicable     Medications given: No    Staff time:3    Additional information if applicable: EUGENIA Linn RN

## 2017-02-09 NOTE — NURSING NOTE
Writer met with Cricket Chen, Introduced myself, answered questions and provided some basic information on my role as Care Coordinator with Doctor Narayanan. New diagnosis of LBDL.   Will start Rituxan times 4 weekly. Writer provider information on Rituxan, administration method and side effects. Per Cricket his veins can be a problem(can only use left arm), at this time only four infusions of Rituxan planned. Alerted infusion of this, that U/S might need to be used to start the IV. Written materials given. Answered questions, provided information and support. Will have endocrine follow-up and see Doctor Davila after repeat CT 3-4 weeks after completion of Rituxan. Will continue to follow for ongoing needs. Given writers resource number if needed.

## 2017-02-10 ENCOUNTER — TELEPHONE (OUTPATIENT)
Dept: TRANSPLANT | Facility: CLINIC | Age: 37
End: 2017-02-10

## 2017-02-10 ENCOUNTER — INFUSION THERAPY VISIT (OUTPATIENT)
Dept: ONCOLOGY | Facility: CLINIC | Age: 37
End: 2017-02-10
Attending: INTERNAL MEDICINE
Payer: COMMERCIAL

## 2017-02-10 VITALS
TEMPERATURE: 97.6 F | OXYGEN SATURATION: 100 % | RESPIRATION RATE: 16 BRPM | HEART RATE: 75 BPM | DIASTOLIC BLOOD PRESSURE: 74 MMHG | SYSTOLIC BLOOD PRESSURE: 140 MMHG

## 2017-02-10 DIAGNOSIS — D47.Z1 POST-TRANSPLANT LYMPHOPROLIFERATIVE DISORDER (H): Primary | ICD-10-CM

## 2017-02-10 LAB — ETHYL GLUCURONIDE UR QL: NORMAL

## 2017-02-10 PROCEDURE — 99212 OFFICE O/P EST SF 10 MIN: CPT | Mod: 25

## 2017-02-10 PROCEDURE — 25000132 ZZH RX MED GY IP 250 OP 250 PS 637: Mod: ZF | Performed by: INTERNAL MEDICINE

## 2017-02-10 PROCEDURE — 96415 CHEMO IV INFUSION ADDL HR: CPT

## 2017-02-10 PROCEDURE — 96361 HYDRATE IV INFUSION ADD-ON: CPT

## 2017-02-10 PROCEDURE — 40000556 ZZH STATISTIC PERIPHERAL IV START W US GUIDANCE: Mod: ZF

## 2017-02-10 PROCEDURE — 96413 CHEMO IV INFUSION 1 HR: CPT

## 2017-02-10 PROCEDURE — 96375 TX/PRO/DX INJ NEW DRUG ADDON: CPT

## 2017-02-10 PROCEDURE — 96376 TX/PRO/DX INJ SAME DRUG ADON: CPT

## 2017-02-10 PROCEDURE — 25000128 H RX IP 250 OP 636: Mod: ZF | Performed by: INTERNAL MEDICINE

## 2017-02-10 RX ORDER — EPINEPHRINE 0.3 MG/.3ML
INJECTION SUBCUTANEOUS
Status: DISCONTINUED
Start: 2017-02-10 | End: 2017-02-10 | Stop reason: WASHOUT

## 2017-02-10 RX ORDER — DIPHENHYDRAMINE HYDROCHLORIDE 50 MG/ML
50 INJECTION INTRAMUSCULAR; INTRAVENOUS
Status: COMPLETED | OUTPATIENT
Start: 2017-02-10 | End: 2017-02-10

## 2017-02-10 RX ORDER — SODIUM CHLORIDE 9 MG/ML
1000 INJECTION, SOLUTION INTRAVENOUS CONTINUOUS PRN
Status: DISCONTINUED | OUTPATIENT
Start: 2017-02-10 | End: 2017-02-10 | Stop reason: HOSPADM

## 2017-02-10 RX ORDER — ACETAMINOPHEN 325 MG/1
650 TABLET ORAL ONCE
Status: COMPLETED | OUTPATIENT
Start: 2017-02-10 | End: 2017-02-10

## 2017-02-10 RX ORDER — METHYLPREDNISOLONE SODIUM SUCCINATE 125 MG/2ML
125 INJECTION, POWDER, LYOPHILIZED, FOR SOLUTION INTRAMUSCULAR; INTRAVENOUS
Status: DISCONTINUED | OUTPATIENT
Start: 2017-02-10 | End: 2017-02-10 | Stop reason: HOSPADM

## 2017-02-10 RX ADMIN — DIPHENHYDRAMINE HYDROCHLORIDE 50 MG: 50 INJECTION INTRAMUSCULAR; INTRAVENOUS at 10:47

## 2017-02-10 RX ADMIN — DIPHENHYDRAMINE HYDROCHLORIDE 50 MG: 50 INJECTION, SOLUTION INTRAMUSCULAR; INTRAVENOUS at 08:30

## 2017-02-10 RX ADMIN — ACETAMINOPHEN 650 MG: 325 TABLET ORAL at 08:27

## 2017-02-10 RX ADMIN — SODIUM CHLORIDE 250 ML: 9 INJECTION, SOLUTION INTRAVENOUS at 08:27

## 2017-02-10 RX ADMIN — SODIUM CHLORIDE 1000 ML: 9 INJECTION, SOLUTION INTRAVENOUS at 10:45

## 2017-02-10 RX ADMIN — METHYLPREDNISOLONE SODIUM SUCCINATE 125 MG: 125 INJECTION, POWDER, FOR SOLUTION INTRAMUSCULAR; INTRAVENOUS at 10:46

## 2017-02-10 RX ADMIN — RITUXIMAB 800 MG: 10 INJECTION, SOLUTION INTRAVENOUS at 08:56

## 2017-02-10 ASSESSMENT — PAIN SCALES - GENERAL: PAINLEVEL: NO PAIN (0)

## 2017-02-10 NOTE — PATIENT INSTRUCTIONS
Contact numbers:  Triage Main Line: [phone]  After hours: [phone]    Call with chills and/or temperature greater than or equal to 100.5 and questions or concerns.    If after hours, weekends, or holidays, call main hospital  at  [phone] and ask for Oncology doctor on call.           February 2017 Sunday Monday Tuesday Wednesday Thursday Friday Saturday                  1     UMP NEW    9:00 AM   (60 min.)   Ileana Reddy MD   [facility] LAB DRAW   10:15 AM   (15 min.)    [facility] LAB DRAW   [facility] Lab Draw 2     3     UMP RETURN    2:50 PM   (20 min.)   Beka Soto MD   [facility] Ear Nose and Throat 4     PE EYE/ ONCOLOGY   10:30 AM   (45 min.)   UUPET1   [facility] [place] PET CT   5     6     7     [facility] LAB DRAW    9:00 AM   (15 min.)    [facility] LAB DRAW   [facility] Lab Draw     [facility] BONE MARROW BIOPSY    9:30 AM   (90 min.)   Lita Velez PA-C   [facility] 8     9     UMP ONC RETURN    8:30 AM   (30 min.)   Girish Narayanan MD   [facility] Blood and Marrow Transplant     LAB WITH HB CLINIC   10:30 AM   (15 min.)    LAB   [facility] Lab 10     UMP ONC INFUSION 360    8:00 AM   (360 min.)    ONCOLOGY INFUSION   [facility] 11       12     13     14     15     16     SPINE INITIAL   12:50 PM   (40 min.)   Lanie Fernández, PT   [facility] Physical Therapy [facility] 17     [facility] LAB DRAW    7:30 AM   (15 min.)    [facility]   [facility]     P ONC INFUSION 360    8:00 AM   (360 min.)    ONCOLOGY INFUSION   [facility] 18       19     20     21     22     23     24     [facility] LAB DRAW    7:30 AM   (15 min.)    [facility] LAB DRAW   [facility] Lab Draw     P ONC INFUSION 360    8:00 AM   (360 min.)    ONCOLOGY INFUSION   [facility] 25     P NEW ENDOCRINE   10:00 AM   (60 min.)   Provider,  Med Diab  Endo   [facility] Endocrinology   26 27 28 March 2017 Sunday Monday Tuesday Wednesday Thursday Friday Saturday                  1     2     [facility]    4:00 PM   (15 min.)   [facility] LAB DRAW   [facility] Lab Draw     [facility] ONC RETURN    4:30 PM   (30 min.)   Girish Narayanan MD   [facility] Blood and Marrow Transplant 3     [facility] ONC INFUSION 360    8:30 AM   (360 min.)    ONCOLOGY INFUSION   [facility] 4       5     6     7     8     9     10     11       12     13     14     15     16     17     18       19     20     21     22     23     24     25       26     27     28     29     30     31                       Lab Results:  No results found for this or any previous visit (from the past 12 hour(s)).

## 2017-02-10 NOTE — TELEPHONE ENCOUNTER
[facility] states tacrolimus is approved for the life of the medication and it is billed under Medicare C.

## 2017-02-10 NOTE — MR AVS SNAPSHOT
After Visit Summary   2/10/2017    Cricket Chen    MRN: [id number]           Patient Information     Date Of Birth          1980        Visit Information        Provider Department      2/10/2017 8:00 AM UC 21 ATC;  ONCOLOGY INFUSION [facility]        Today's Diagnoses     Post-transplant lymphoproliferative disorder (H)    -  1       Care Instructions    Contact numbers:  Triage Main Line: [phone]  After hours: [phone]    Call with chills and/or temperature greater than or equal to 100.5 and questions or concerns.    If after hours, weekends, or holidays, call main hospital  at  [phone] and ask for Oncology doctor on call.           February 2017 Sunday Monday Tuesday Wednesday Thursday Friday Saturday                  1     UMP NEW    9:00 AM   (60 min.)   Ileana Reddy MD   [facility] LAB DRAW   10:15 AM   (15 min.)    [facility] LAB DRAW   [facility] Lab Draw 2     3     UMP RETURN    2:50 PM   (20 min.)   Beka Soto MD   [facility] Ear Nose and Throat 4     PE EYE/TH ONCOLOGY   10:30 AM   (45 min.)   [id number]   [facility] [place] PET CT   5     6     7     [facility] LAB DRAW    9:00 AM   (15 min.)    [facility] LAB DRAW   [facility] Lab Draw     [facility] BONE MARROW BIOPSY    9:30 AM   (90 min.)   Lita Velez PA-C   [facility] 8     9     P ONC RETURN    8:30 AM   (30 min.)   Girish Narayanan MD   [facility] Blood and Marrow Transplant     LAB WITH HB CLINIC   10:30 AM   (15 min.)    LAB   [facility] Lab 10     [facility] ONC INFUSION 360    8:00 AM   (360 min.)    ONCOLOGY INFUSION   [facility] 11       12     13     14     15     16     SPINE INITIAL   12:50 PM   (40 min.)   Lanie Fernández, PT   [facility] Physical Therapy PR[facility] 17     [facility] LAB DRAW    7:30 AM   (15 min.)    [facility] LAB DRAW   [facility] Lab Draw     [facility] ONC INFUSION  360    8:00 AM   (360 min.)   UC ONCOLOGY INFUSION   [facility] 18       19     20     21     22     23     24     [facility] LAB DRAW    7:30 AM   (15 min.)    [facility] LAB DRAW   [facility] Mason[facility] Lab Draw     [facility] ONC INFUSION 360    8:00 AM   (360 min.)   UC ONCOLOGY INFUSION   [facility] 25     UMP NEW ENDOCRINE   10:00 AM   (60 min.)   Provider, Destiny Med Diab Endo   [facility] Endocrinology   26 27 28 March 2017 Sunday Monday Tuesday Wednesday Thursday Friday Saturday                  1     2     [facility] LAB DRAW    4:00 PM   (15 min.)    [facility]   [facility]     [facility] ONC RETURN    4:30 PM   (30 min.)   Girish Narayanan MD   [facility] Blood and Marrow Transplant 3     [facility] ONC INFUSION 360    8:30 AM   (360 min.)    ONCOLOGY INFUSION   [facility] 4       5     6     7     8     9     10     11       12     13     14     15     16     17     18       19     20     21     22     23     24     25       26     27     28     29     30     31                       Lab Results:  No results found for this or any previous visit (from the past 12 hour(s)).            Follow-ups after your visit        Your next 10 appointments already scheduled     Feb 16, 2017 12:50 PM   (Arrive by 12:35 PM)   PR[facility] Spine with Lanie Fernández PT   [facility] Physical Therapy PR[facility] ([facility])    [address] [place] [id number]   [phone]            Feb 17, 2017  7:30 AM   [facility] Lab Draw with  [facility] LAB DRAW   [facility]    [address] [place] [address]   [phone]            Feb 17, 2017  8:00 AM   Infusion 360 with  ONCOLOGY INFUSION, UC 14 ATC   [facility] Clinic ([facility])    [address] [place] [address]  [place] [address]   [phone]            Feb 24, 2017  7:30 AM   [facility] Lab Draw with  MASONIC LAB DRAW   [facility] Masonic Lab Draw ([facility])    [address] [facility]  [address] [facility] [address]   [phone]            Feb 24, 2017  8:00 AM   Infusion 360 with  ONCOLOGY INFUSION, UC 14 ATC   [facility] ([facility])    [address] [place] [address]  [address] [facility] [address]   [phone]            Feb 25, 2017 10:00 AM   (Arrive by 9:45 AM)   NEW ENDOCRINE with  Med Diab Endo Provider   [facility] Endocrinology ([facility])    [address] [place] [address]  [address] [facility] [address]   [phone]            Mar 02, 2017  4:00 PM   [facility] Lab Draw with UC MASON[facility] LAB DRAW   [facility] Lab Draw ([facility])    [address] [place] [address]   [phone]            Mar 02, 2017  4:30 PM   RETURN ONC with Girish Narayanan MD   [facility] Blood and Marrow Transplant ([facility])    [address] [place] [address]   [phone]              Future tests that were ordered for you today     Open Future Orders        Priority Expected Expires Ordered    Endocrinology Adult Referral ASAP 2/16/2017 3/29/2017 2/9/2017            Who to contact     If you have questions or need follow up information about today's clinic visit or your schedule please contact [facility] CANCER [facility] directly at [phone].  Normal or non-critical lab and imaging results will be communicated to you by MyChart, letter or phone within 4 business days after the clinic has received the results. If you do not hear from us within 7 days, please contact the clinic through MyChart or phone. If you have a critical or abnormal lab result, we will notify you by phone as soon as possible.  Submit refill requests through  "[employer] or call your pharmacy and they will forward the refill request to us. Please allow 3 business days for your refill to be completed.          Additional Information About Your Visit        [employer] Information     "[employer] gives you secure access to your electronic health record. If you see a primary care provider, you can also send messages to your care team and make appointments. If you have questions, please call your primary care clinic.  If you do not have a primary care provider, please call [phone] and they will assist you.        Care EveryWhere ID     This is your Care EveryWhere ID. This could be used by other organizations to access your [facility] medical records  [id number]        Your Vitals Were     Pulse Temperature Respirations Pulse Oximetry          75 97.6  F (36.4  C) (Oral) 16 100%         Blood Pressure from Last 3 Encounters:   02/10/17 140/74   02/09/17 120/72   02/07/17 135/80    Weight from Last 3 Encounters:   02/09/17 89.4 kg (197 lb 1.5 oz)   02/07/17 89.223 kg (196 lb 11.2 oz)   02/03/17 88.905 kg (196 lb)              Today, you had the following     No orders found for display       Primary Care Provider Office Phone # Fax #    Eugenia Perez [phone]       [address] [address]        Thank you!     Thank you for choosing [facility] CANCER CL[facility]  for your care. Our goal is always to provide you with excellent care. Hearing back from our patients is one way we can continue to improve our services. Please take a few minutes to complete the written survey that you may receive in the mail after your visit with us. Thank you!             Your Updated Medication List - Protect others around you: Learn how to safely use, store and throw away your medicines at www.disposemymeds.org.          This list is accurate as of: 2/10/17  1:58 PM.  Always use your most recent med list.                   Brand Name  Dispense Instructions for use    amLODIPine 10 MG tablet    NORVASC    90 tablet    Take 1 tablet (10 mg) by mouth daily       lisinopril 5 MG tablet    PRINIVIL/ZESTRIL    90 tablet    Take 1 tablet (5 mg) by mouth daily       magnesium oxide 400 (241.3 MG) MG tablet    MAG-OX    120 tablet    Take 2 tablets (800 mg) by mouth 2 times daily       mycophenolate 250 MG capsule    CELLCEPT - GENERIC EQUIVALENT    60 capsule    Take 1 capsule (250 mg) by mouth 2 times daily       sulfamethoxazole-trimethoprim 400-80 MG per tablet    BACTRIM/SEPTRA    30 tablet    Take 1 tablet by mouth daily       tacrolimus capsule     180 capsule    Take 3 capsules (3 mg) by mouth 2 times daily       traMADol 50 MG tablet    ULTRAM    45 tablet    Take 1 tablet (50 mg) by mouth every 8 hours as needed for moderate pain       vitamin D 2000 UNITS tablet     100 tablet    Take 2,000 Units by mouth daily

## 2017-02-10 NOTE — PROGRESS NOTES
Infusion Nursing Note:  Cricket Chen presents for C1D1 Rituxan  Met with Dr. Narayanan yesterday, no changes overnight    Note: Patient new to oncology infusion.  New patient teaching done previously.  All medications and side effects reviewed with patient. Patient instructed to call triage with questions/concerns or if they have chills and/or temperature greater than or equal to 100.5, pt verbalized understanding of plan.    Pt only had CBC with platelets yesterday, no differential was done.    TORB- Dr. Narayanan/Viki Tariq, VAL  --no need to redraw CBC with differential today, okay to proceed with Rituxan today without ANC    When rituxan was running at 200ml/hour, pt called RN over and complained of flushing and itching on his neck/head. Rituxan stopped, benadryl and solumedrol given, VSS, pt denies any shortness of breath or tightness in his chest. Ears and neck red, no hives noted, no itching anywhere else. Dr. Narayanan notified- okay to restart at half the rate once symptoms resolve.    Rituxan restarted at a rate of 100ml/hour, increased by 50ml/hour every 30 minutes to a max rate of 400ml/hour, pt tolerated remainder of infusion without issue    Treatment Conditions:  HGB     14.9   2/9/2017  WBC      4.8   2/9/2017   ANEU      4.2   2/7/2017  PLT      105   2/9/2017     NA      141   2/9/2017                POTASSIUM      4.4   2/9/2017        MAG      2.0   2/9/2017         CR     1.23   2/9/2017                COLBY      9.2   2/9/2017             BILITOTAL      0.4   2/9/2017        ALBUMIN      4.5   2/9/2017                 ALT       26   2/9/2017        AST       12   2/9/2017  Results reviewed, labs MET treatment parameters, ok to proceed with treatment.    Intravenous Access:  Peripheral IV placed by vascular access with ultrasound.    Post Infusion Assessment:  Patient tolerated infusion poorly due to rituxan reaction, see note above.  Blood return noted pre and post infusion.  No evidence of  extravasations.  Access discontinued per protocol.    Discharge Plan:   Patient declined prescription refills.  Discharge instructions reviewed with: Patient.  Patient and/or family verbalized understanding of discharge instructions and all questions answered.  AVS to patient via [employer].  Patient will return 2/17 for next appointment.   Patient discharged in stable condition accompanied by: self.  Face time: 5 minutes    Viki Tariq RN

## 2017-02-11 LAB
EBV DNA # SPEC NAA+PROBE: 4720 {COPIES}/ML
EBV DNA SPEC NAA+PROBE-LOG#: 3.7 {LOG_COPIES}/ML

## 2017-02-16 ENCOUNTER — THERAPY VISIT (OUTPATIENT)
Dept: PHYSICAL THERAPY | Facility: CLINIC | Age: 37
End: 2017-02-16
Payer: COMMERCIAL

## 2017-02-16 DIAGNOSIS — M25.511 RIGHT SHOULDER PAIN, UNSPECIFIED CHRONICITY: ICD-10-CM

## 2017-02-16 DIAGNOSIS — M54.2 CERVICALGIA: Primary | ICD-10-CM

## 2017-02-16 PROCEDURE — G8982 BODY POS GOAL STATUS: HCPCS | Mod: GP | Performed by: PHYSICAL THERAPIST

## 2017-02-16 PROCEDURE — 97110 THERAPEUTIC EXERCISES: CPT | Mod: GP | Performed by: PHYSICAL THERAPIST

## 2017-02-16 PROCEDURE — G8981 BODY POS CURRENT STATUS: HCPCS | Mod: GP | Performed by: PHYSICAL THERAPIST

## 2017-02-16 PROCEDURE — 97162 PT EVAL MOD COMPLEX 30 MIN: CPT | Mod: GP | Performed by: PHYSICAL THERAPIST

## 2017-02-16 NOTE — LETTER
"DEPARTMENT OF HEALTH AND HUMAN SERVICES  CENTERS FOR MEDICARE & MEDICAID SERVICES    PLAN/UPDATED PLAN OF PROGRESS FOR OUTPATIENT REHABILITATION    PATIENTS NAME:  Cricket Chen     : 1980    PROVIDER NUMBER:    [id number]    [id number]    PROVIDER NAME: [facility] PHYSICAL THERAPY [facility]    MEDICAL RECORD NUMBER: [id number]     START OF CARE DATE:  SOC Date: 17   TYPE:  PT    PRIMARY/TREATMENT DIAGNOSIS: (Pertinent Medical Diagnosis)     Cervicalgia  Right shoulder pain, unspecified chronicity    VISITS FROM START OF CARE:  Rxs Used: 1   KEY PT FINDINGS:  1) Restriction of left cervical rotation with tension along right anterior cervical spine  2) Weakness of scapular stabilizers  3) Upper trap dominance in OH motion    Physical Therapy Initial Evaluation: Subjective History     Injury/Condition Details:  Presenting Complaint Right neck and Right shoulder   Onset Timing/Date End of January - , lymph node removal   Mechanism Surgery to remove the lymph nodes and the MD prescribed PT to maintain range of motion and \"prevent further issues\"     Symptom Behavior Details    Primary Symptoms Sporadic symptoms; Activity/position dependent, pain (Location: Upper trap, right side. No pain into the arm, denies pain in the neck or into the face, Quality: Aching/Throbbing), weakness, denies numbness/tingling.    Worst Pain 6/10 (with sleeping/laying on the right side)   Symptom Provocators Leaning on the right arm, no problems with pushing or pulling or lifting or carrying   Best Pain 0/10    Symptom Relievers Avoiding provoking activities   Time of day dependent? No   Recent symptom change? symptoms improving   PATIENTS NAME:  Cricket Chen     Prior Testing/Intervention for current condition:  Prior Tests  x-ray, MRI and CT scan   Prior Treatment Surgery(ies): Initial injury in January     Lifestyle & General Medical History:  Employment Unemployed,    Usual physical activities  (within past year) " Walking, daily activities - would like to get back to golfing this summer.    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        HPI                  Objective:    Standing Alignment:    Cervical/Thoracic:  Forward head  Shoulder/UE:  Rounded shoulders, protracted scapula L and protracted scapula R    Flexibility/Screens:   Positive screens:  Cervical and Shoulder  Upper Extremity:    Decreased left upper extremity flexibility at:  Pectoralis Major and Pectoralis Minor    Decreased right upper extremity flexibility present at:  Pectoralis Major and Pectoralis Minor    Spine:  Decreased left spine flexibility:  Upper Trap and Levator    Decreased right spine flexibility:  Upper Trap and Levator    Cervical/Thoracic Evaluation  AROM:  AROM Cervical:  Flexion:          No limit  Extension:       Limited with anterior tension  Rotation:         Left: 65 degrees     Right: 70 degrees  Side Bend:      Left:     Right:   AROM Thoracic:    Flexion:              Extension:           Rotation:            Left: Decreased without pain     Right: Decreased without pain    Strength: Middle trap: 4-/5 bilaterally, Rhomboids: 3+/5 bilaterally, Lower trap: 3-/5, will assess DNF strength at next visit  PATIENTS NAME:  Erikacindifloridanik Cricket     Headaches: none  Cervical Myotomes:  not assessed  Cervical Dermatomes:  not assessed  Cervical Palpation:  : Restricted tissue mobility along right cervical spine.   Tenderness present at Left:    Rhomboids; Upper Trap; Levator; Erector Spinae and Suboccipitals  Tenderness present at Right:    Rhomboids; Upper Trap; Levator; Erector Spinae and Suboccipitals    Shoulder Evaluation:  ROM:  AROM:  normal (Abduction: UT dominant with scapular elevation before arm raise. )    Strength:  : No pain with resisted testing, decreased strength in flexion, abduction and external rotation.      General   ROS    Assessment/Plan:    Patient is a 36 year old male with cervical and right side shoulder complaints.    Patient has the following significant findings with corresponding treatment plan.                Diagnosis 1:  Neck and shoulder pain s/p neck surgery  Pain -  hot/cold therapy, manual therapy, STS, splint/taping/bracing/orthotics, self management and education  Decreased ROM/flexibility - manual therapy, therapeutic exercise and home program  Decreased joint mobility - manual therapy, therapeutic exercise and home program  Decreased strength - therapeutic exercise, therapeutic activities and home program  Impaired muscle performance - neuro re-education and home program  Decreased function - therapeutic activities and home program  Impaired posture - neuro re-education and home program    Therapy Evaluation Codes:   1) History comprised of:   Personal factors that impact the plan of care:      Overall behavior pattern.    Comorbidity factors that impact the plan of care are:      Cancer, Weakness and liver & kidney transplant recipient.     Medications impacting care: Chemo therapy, anti-rejection medication.  2) Examination of Body Systems comprised of:   Body structures and functions that impact the plan of care:      Cervical spine and Shoulder.   Activity limitations that impact the plan of care are:      Lifting, Sleeping and Laying down.  3) Clinical presentation characteristics are:   Evolving/Changing.  4) Decision-Making    Moderate complexity using standardized patient assessment instrument and/or measureable assessment of functional outcome.  PATIENTS NAME:  ErikaCricket jones     Cumulative Therapy Evaluation is: Moderate complexity.    Previous and current functional limitations:  (See Goal Flow Sheet for this information)    Short term and Long term goals: (See Goal Flow Sheet for this information)     Communication ability:  Patient appears to be able to clearly communicate and  "understand verbal and written communication and follow directions correctly.  Treatment Explanation - The following has been discussed with the patient:   RX ordered/plan of care  Anticipated outcomes  Possible risks and side effects  This patient would benefit from PT intervention to resume normal activities.   Rehab potential is good.    Frequency:  2 X month, once daily  Duration:  for 2 months  Discharge Plan:  Achieve all LTG.  Independent in home treatment program.  Reach maximal therapeutic benefit.    Please refer to the daily flowsheet for treatment today, total treatment time and time spent performing 1:1 timed codes.           Caregiver Signature/Credentials _____________________________ Date ________       Treating Provider: Lanie Fernández, PT, DPT, OCS  I have reviewed and certified the need for these services and plan of treatment while under my care.        PHYSICIAN'S SIGNATURE:   _________________________________________  Date___________   Beka Soto    Certification period:  Beginning of Cert date period: 02/16/17 to  End of Cert period date: 05/16/17     Functional Level Progress Report: Please see attached \"Goal Flow sheet for Functional level.\"    ____X____ Continue Services or       ________ DC Services                Service dates: From  SOC Date: 02/16/17 date to present                         "

## 2017-02-16 NOTE — MR AVS SNAPSHOT
After Visit Summary   2/16/2017    Cricket Chen    MRN: [id number]           Patient Information     Date Of Birth          1980        Visit Information        Provider Department      2/16/2017 12:50 PM Lanie Fernández, PT [facility] Physical Therapy [facility]        Today's Diagnoses     Cervicalgia    -  1    Right shoulder pain, unspecified chronicity           Follow-ups after your visit        Your next 10 appointments already scheduled     Feb 24, 2017  7:30 AM CST   Masonic Lab Draw with UC MASONIC LAB DRAW   [facility] ([facility])    [address]  [address] [facility] [address]   [phone]            Feb 24, 2017  8:00 AM CST   Infusion 360 with UC ONCOLOGY INFUSION, UC 14 ATC   [facility] ([facility])    [address] [place] [address]  [address] [facility] [address]   [phone]            Feb 25, 2017 10:00 AM CST   (Arrive by 9:45 AM)   NEW ENDOCRINE with  Med Diab Endo Provider   [facility] Endocrinology ([facility])    [address] [place] [address]  3rd Floor  [facility] [address]   [phone]            Mar 01, 2017  4:00 PM CST   PR[facility]LLA Spine with Lanie Fernández, PT   [facility] ([facility])    [address]  [address] [facility] [address]   [phone]            Mar 02, 2017  4:00 PM CST   Masonic Lab Draw with UC MASONIC LAB DRAW   [facility] ([facility])    [address]  [address] [facility] [address]   [phone]            Mar 02, 2017  4:30 PM CST   RETURN ONC with Girish Narayanan MD   [facility] Blood and Marrow Transplant ([facility])    [address]  [address] [facility] [address]   [phone]            Mar 03, 2017  8:30 AM CST   Infusion 360 with UC ONCOLOGY INFUSION, UC 19 ATC   [facility]  Clinic ([facility] [place])    [address]  2nd Floor  [facility] [address] [phone]            Apr 04, 2017 12:10 PM CDT   (Arrive by 11:55 AM)   Return General Liver with Laureen Galvan MD   [facility] Hepatology ([facility])    [address]  3rd Floor  [facility] [address] [phone]              Who to contact     If you have questions or need follow up information about today's clinic visit or your schedule please contact [facility] PHYSICAL THERAPY [facility] directly at [phone].  Normal or non-critical lab and imaging results will be communicated to you by [employer], letter or phone within 4 business days after the clinic has received the results. If you do not hear from us within 7 days, please contact the clinic through [employer] or phone. If you have a critical or abnormal lab result, we will notify you by phone as soon as possible.  Submit refill requests through [employer] or call your pharmacy and they will forward the refill request to us. Please allow 3 business days for your refill to be completed.          Additional Information About Your Visit        [employer] Information     [employer] gives you secure access to your electronic health record. If you see a primary care provider, you can also send messages to your care team and make appointments. If you have questions, please call your primary care clinic.  If you do not have a primary care provider, please call [phone] and they will assist you.        Care EveryWhere ID     This is your Care EveryWhere ID. This could be used by other organizations to access your [place] medical records  [id number]         Blood Pressure from Last 3 Encounters:   02/17/17 131/76   02/10/17 140/74   02/09/17 120/72    Weight from Last 3 Encounters:   02/17/17 89.9 kg (198 lb 1.6 oz)   02/09/17 89.4 kg (197 lb 1.5 oz)   02/07/17 89.2 kg (196 lb 11.2 oz)              We Performed the  Following     HC PT EVAL, MODERATE COMPLEXITY     [facility] CERT REPORT     THERAPEUTIC EXERCISES        Primary Care Provider Office Phone # Fax #    [place] Katerina Perez [phone]       [address] [address]        Thank you!     Thank you for choosing [facility] PHYSICAL THERAPY [facility]  for your care. Our goal is always to provide you with excellent care. Hearing back from our patients is one way we can continue to improve our services. Please take a few minutes to complete the written survey that you may receive in the mail after your visit with us. Thank you!             Your Updated Medication List - Protect others around you: Learn how to safely use, store and throw away your medicines at www.disposemymeds.org.          This list is accurate as of: 2/16/17 11:59 PM.  Always use your most recent med list.                   Brand Name Dispense Instructions for use    amLODIPine 10 MG tablet    NORVASC    90 tablet    Take 1 tablet (10 mg) by mouth daily       lisinopril 5 MG tablet    PRINIVIL/ZESTRIL    90 tablet    Take 1 tablet (5 mg) by mouth daily       magnesium oxide 400 (241.3 MG) MG tablet    MAG-OX    120 tablet    Take 2 tablets (800 mg) by mouth 2 times daily       mycophenolate 250 MG capsule    CELLCEPT - GENERIC EQUIVALENT    60 capsule    Take 1 capsule (250 mg) by mouth 2 times daily       sulfamethoxazole-trimethoprim 400-80 MG per tablet    BACTRIM/SEPTRA    30 tablet    Take 1 tablet by mouth daily       tacrolimus capsule     180 capsule    Take 3 capsules (3 mg) by mouth 2 times daily       traMADol 50 MG tablet    ULTRAM    45 tablet    Take 1 tablet (50 mg) by mouth every 8 hours as needed for moderate pain       vitamin D 2000 UNITS tablet     100 tablet    Take 2,000 Units by mouth daily

## 2017-02-16 NOTE — PROGRESS NOTES
"KEY PT FINDINGS:  1) Restriction of left cervical rotation with tension along right anterior cervical spine  2) Weakness of scapular stabilizers  3) Upper trap dominance in OH motion    Physical Therapy Initial Evaluation: Subjective History     Injury/Condition Details:  Presenting Complaint Right neck and Right shoulder   Onset Timing/Date End of January - DOS, lymph node removal   Mechanism Surgery to remove the lymph nodes and the MD prescribed PT to maintain range of motion and \"prevent further issues\"     Symptom Behavior Details    Primary Symptoms Sporadic symptoms; Activity/position dependent, pain (Location: Upper trap, right side. No pain into the arm, denies pain in the neck or into the face, Quality: Aching/Throbbing), weakness, denies numbness/tingling.    Worst Pain 6/10 (with sleeping/laying on the right side)   Symptom Provocators Leaning on the right arm, no problems with pushing or pulling or lifting or carrying   Best Pain 0/10    Symptom Relievers Avoiding provoking activities   Time of day dependent? No   Recent symptom change? symptoms improving     Prior Testing/Intervention for current condition:  Prior Tests  x-ray, MRI and CT scan   Prior Treatment Surgery(ies): Initial injury in January     Lifestyle & General Medical History:  Employment Unemployed,    Usual physical activities  (within past year) Walking, daily activities - would like to get back to golfing this summer.    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          HPI                    Objective:    Standing Alignment:    Cervical/Thoracic:  Forward head  Shoulder/UE:  Rounded shoulders, protracted scapula L and protracted scapula R                  Flexibility/Screens:   Positive screens:  Cervical and Shoulder  Upper Extremity:    Decreased left upper extremity flexibility at:  Pectoralis Major and " Pectoralis Minor    Decreased right upper extremity flexibility present at:  Pectoralis Major and Pectoralis Minor    Spine:  Decreased left spine flexibility:  Upper Trap and Levator    Decreased right spine flexibility:  Upper Trap and Levator                  Cervical/Thoracic Evaluation    AROM:  AROM Cervical:    Flexion:          No limit  Extension:       Limited with anterior tension  Rotation:         Left: 65 degrees     Right: 70 degrees  Side Bend:      Left:     Right:   AROM Thoracic:    Flexion:              Extension:           Rotation:            Left: Decreased without pain     Right: Decreased without pain    Strength: Middle trap: 4-/5 bilaterally, Rhomboids: 3+/5 bilaterally, Lower trap: 3-/5, will assess DNF strength at next visit  Headaches: none  Cervical Myotomes:  not assessed                      Cervical Dermatomes:  not assessed                    Cervical Palpation:  : Restricted tissue mobility along right cervical spine.   Tenderness present at Left:    Rhomboids; Upper Trap; Levator; Erector Spinae and Suboccipitals  Tenderness present at Right:    Rhomboids; Upper Trap; Levator; Erector Spinae and Suboccipitals               Shoulder Evaluation:  ROM:  AROM:  normal (Abduction: UT dominant with scapular elevation before arm raise. )                                  Strength:  : No pain with resisted testing, decreased strength in flexion, abduction and external rotation.                                                                General     ROS    Assessment/Plan:      Patient is a 36 year old male with cervical and right side shoulder complaints.    Patient has the following significant findings with corresponding treatment plan.                Diagnosis 1:  Neck and shoulder pain s/p neck surgery  Pain -  hot/cold therapy, manual therapy, STS, splint/taping/bracing/orthotics, self management and education  Decreased ROM/flexibility - manual therapy, therapeutic exercise and  home program  Decreased joint mobility - manual therapy, therapeutic exercise and home program  Decreased strength - therapeutic exercise, therapeutic activities and home program  Impaired muscle performance - neuro re-education and home program  Decreased function - therapeutic activities and home program  Impaired posture - neuro re-education and home program    Therapy Evaluation Codes:   1) History comprised of:   Personal factors that impact the plan of care:      Overall behavior pattern.    Comorbidity factors that impact the plan of care are:      Cancer, Weakness and liver & kidney transplant recipient.     Medications impacting care: Chemo therapy, anti-rejection medication.  2) Examination of Body Systems comprised of:   Body structures and functions that impact the plan of care:      Cervical spine and Shoulder.   Activity limitations that impact the plan of care are:      Lifting, Sleeping and Laying down.  3) Clinical presentation characteristics are:   Evolving/Changing.  4) Decision-Making    Moderate complexity using standardized patient assessment instrument and/or measureable assessment of functional outcome.  Cumulative Therapy Evaluation is: Moderate complexity.    Previous and current functional limitations:  (See Goal Flow Sheet for this information)    Short term and Long term goals: (See Goal Flow Sheet for this information)     Communication ability:  Patient appears to be able to clearly communicate and understand verbal and written communication and follow directions correctly.  Treatment Explanation - The following has been discussed with the patient:   RX ordered/plan of care  Anticipated outcomes  Possible risks and side effects  This patient would benefit from PT intervention to resume normal activities.   Rehab potential is good.    Frequency:  2 X month, once daily  Duration:  for 2 months  Discharge Plan:  Achieve all LTG.  Independent in home treatment program.  Reach maximal  therapeutic benefit.    Please refer to the daily flowsheet for treatment today, total treatment time and time spent performing 1:1 timed codes.

## 2017-02-17 ENCOUNTER — APPOINTMENT (OUTPATIENT)
Dept: LAB | Facility: CLINIC | Age: 37
End: 2017-02-17
Attending: INTERNAL MEDICINE
Payer: COMMERCIAL

## 2017-02-17 ENCOUNTER — INFUSION THERAPY VISIT (OUTPATIENT)
Dept: ONCOLOGY | Facility: CLINIC | Age: 37
End: 2017-02-17
Attending: INTERNAL MEDICINE
Payer: COMMERCIAL

## 2017-02-17 VITALS
HEART RATE: 77 BPM | BODY MASS INDEX: 28.04 KG/M2 | OXYGEN SATURATION: 100 % | RESPIRATION RATE: 16 BRPM | SYSTOLIC BLOOD PRESSURE: 131 MMHG | TEMPERATURE: 97.6 F | WEIGHT: 198.1 LBS | DIASTOLIC BLOOD PRESSURE: 76 MMHG

## 2017-02-17 DIAGNOSIS — D47.Z1 POST-TRANSPLANT LYMPHOPROLIFERATIVE DISORDER (H): Primary | ICD-10-CM

## 2017-02-17 DIAGNOSIS — Z94.4 LIVER REPLACED BY TRANSPLANT (H): ICD-10-CM

## 2017-02-17 PROBLEM — M25.511 RIGHT SHOULDER PAIN: Status: ACTIVE | Noted: 2017-02-17

## 2017-02-17 PROBLEM — M54.2 CERVICALGIA: Status: ACTIVE | Noted: 2017-02-17

## 2017-02-17 LAB
ALBUMIN SERPL-MCNC: 4 G/DL (ref 3.4–5)
ALP SERPL-CCNC: 94 U/L (ref 40–150)
ALT SERPL W P-5'-P-CCNC: 21 U/L (ref 0–70)
ANION GAP SERPL CALCULATED.3IONS-SCNC: 10 MMOL/L (ref 3–14)
AST SERPL W P-5'-P-CCNC: 14 U/L (ref 0–45)
BASOPHILS # BLD AUTO: 0 10E9/L (ref 0–0.2)
BASOPHILS NFR BLD AUTO: 0.4 %
BILIRUB DIRECT SERPL-MCNC: 0.2 MG/DL (ref 0–0.2)
BILIRUB SERPL-MCNC: 0.6 MG/DL (ref 0.2–1.3)
BUN SERPL-MCNC: 18 MG/DL (ref 7–30)
CALCIUM SERPL-MCNC: 8.8 MG/DL (ref 8.5–10.1)
CHLORIDE SERPL-SCNC: 108 MMOL/L (ref 94–109)
CO2 SERPL-SCNC: 24 MMOL/L (ref 20–32)
CREAT SERPL-MCNC: 1.08 MG/DL (ref 0.66–1.25)
DIFFERENTIAL METHOD BLD: ABNORMAL
EOSINOPHIL # BLD AUTO: 0.1 10E9/L (ref 0–0.7)
EOSINOPHIL NFR BLD AUTO: 1.1 %
ERYTHROCYTE [DISTWIDTH] IN BLOOD BY AUTOMATED COUNT: 13.1 % (ref 10–15)
GFR SERPL CREATININE-BSD FRML MDRD: 77 ML/MIN/1.7M2
GLUCOSE SERPL-MCNC: 99 MG/DL (ref 70–99)
HCT VFR BLD AUTO: 41.2 % (ref 40–53)
HGB BLD-MCNC: 13.7 G/DL (ref 13.3–17.7)
IMM GRANULOCYTES # BLD: 0 10E9/L (ref 0–0.4)
IMM GRANULOCYTES NFR BLD: 0.6 %
LYMPHOCYTES # BLD AUTO: 0.6 10E9/L (ref 0.8–5.3)
LYMPHOCYTES NFR BLD AUTO: 11 %
MAGNESIUM SERPL-MCNC: 2.1 MG/DL (ref 1.6–2.3)
MCH RBC QN AUTO: 29.4 PG (ref 26.5–33)
MCHC RBC AUTO-ENTMCNC: 33.3 G/DL (ref 31.5–36.5)
MCV RBC AUTO: 88 FL (ref 78–100)
MONOCYTES # BLD AUTO: 0.4 10E9/L (ref 0–1.3)
MONOCYTES NFR BLD AUTO: 8.3 %
NEUTROPHILS # BLD AUTO: 4.2 10E9/L (ref 1.6–8.3)
NEUTROPHILS NFR BLD AUTO: 78.6 %
NRBC # BLD AUTO: 0 10*3/UL
NRBC BLD AUTO-RTO: 0 /100
PHOSPHATE SERPL-MCNC: 2.6 MG/DL (ref 2.5–4.5)
PLATELET # BLD AUTO: 99 10E9/L (ref 150–450)
POTASSIUM SERPL-SCNC: 4 MMOL/L (ref 3.4–5.3)
PROT SERPL-MCNC: 6.9 G/DL (ref 6.8–8.8)
RBC # BLD AUTO: 4.66 10E12/L (ref 4.4–5.9)
SODIUM SERPL-SCNC: 141 MMOL/L (ref 133–144)
TACROLIMUS BLD-MCNC: 8.9 UG/L (ref 5–15)
TME LAST DOSE: NORMAL H
WBC # BLD AUTO: 5.3 10E9/L (ref 4–11)

## 2017-02-17 PROCEDURE — 83735 ASSAY OF MAGNESIUM: CPT | Performed by: INTERNAL MEDICINE

## 2017-02-17 PROCEDURE — 96415 CHEMO IV INFUSION ADDL HR: CPT

## 2017-02-17 PROCEDURE — 25000132 ZZH RX MED GY IP 250 OP 250 PS 637: Mod: ZF | Performed by: INTERNAL MEDICINE

## 2017-02-17 PROCEDURE — 84100 ASSAY OF PHOSPHORUS: CPT | Performed by: INTERNAL MEDICINE

## 2017-02-17 PROCEDURE — 80048 BASIC METABOLIC PNL TOTAL CA: CPT | Performed by: INTERNAL MEDICINE

## 2017-02-17 PROCEDURE — 96375 TX/PRO/DX INJ NEW DRUG ADDON: CPT

## 2017-02-17 PROCEDURE — 80197 ASSAY OF TACROLIMUS: CPT | Performed by: INTERNAL MEDICINE

## 2017-02-17 PROCEDURE — 85025 COMPLETE CBC W/AUTO DIFF WBC: CPT | Performed by: INTERNAL MEDICINE

## 2017-02-17 PROCEDURE — 40000556 ZZH STATISTIC PERIPHERAL IV START W US GUIDANCE

## 2017-02-17 PROCEDURE — 96413 CHEMO IV INFUSION 1 HR: CPT

## 2017-02-17 PROCEDURE — 25000128 H RX IP 250 OP 636: Mod: ZF | Performed by: INTERNAL MEDICINE

## 2017-02-17 PROCEDURE — 80076 HEPATIC FUNCTION PANEL: CPT | Performed by: INTERNAL MEDICINE

## 2017-02-17 RX ORDER — ACETAMINOPHEN 325 MG/1
650 TABLET ORAL ONCE
Status: COMPLETED | OUTPATIENT
Start: 2017-02-17 | End: 2017-02-17

## 2017-02-17 RX ORDER — EPINEPHRINE 0.3 MG/.3ML
INJECTION SUBCUTANEOUS
Status: DISCONTINUED
Start: 2017-02-17 | End: 2017-02-17 | Stop reason: WASHOUT

## 2017-02-17 RX ADMIN — DIPHENHYDRAMINE HYDROCHLORIDE 50 MG: 50 INJECTION, SOLUTION INTRAMUSCULAR; INTRAVENOUS at 08:33

## 2017-02-17 RX ADMIN — RITUXIMAB 800 MG: 10 INJECTION, SOLUTION INTRAVENOUS at 08:58

## 2017-02-17 RX ADMIN — ACETAMINOPHEN 650 MG: 325 TABLET ORAL at 08:33

## 2017-02-17 RX ADMIN — SODIUM CHLORIDE 250 ML: 9 INJECTION, SOLUTION INTRAVENOUS at 08:34

## 2017-02-17 ASSESSMENT — PAIN SCALES - GENERAL: PAINLEVEL: NO PAIN (0)

## 2017-02-17 NOTE — MR AVS SNAPSHOT
After Visit Summary   2/17/2017    Cricket Chen    MRN: [id number]           Patient Information     Date Of Birth          1980        Visit Information        Provider Department      2/17/2017 8:00 AM  14 ATC;  ONCOLOGY INFUSION [facility]        Today's Diagnoses     Post-transplant lymphoproliferative disorder (H)    -  1    Liver replaced by transplant (H)          Care Instructions    Contact Numbers  [facility] Nurse Triage: [phone]  After Hours Nurse Line:  [phone]  Hospital Main Line:  [phone]    Please call the [facility] Triage line if you experience a temperature greater than or equal to 100.5, shaking chills, have uncontrolled nausea, vomiting and/or diarrhea, dizziness, shortness of breath, chest pain, bleeding, unexplained bruising, or if you have any other new/concerning symptoms, questions or concerns.     If it is after hours, weekends, or holidays, please call either the after hours nurse line listed above or the main hospital  at  [phone] and ask to speak to the Oncology doctor on call.     If you are having any concerning symptoms or wish to speak to a provider before your next infusion visit, please call your care coordinator or triage to notify them so we can adequately serve you.     If you need a refill on a narcotic prescription or other medication, please call triage before your infusion appointment.         February 2017 Sunday Monday Tuesday Wednesday Thursday Friday Saturday                  1     [facility] NEW    8:45 AM   (60 min.)   Ileana Reddy MD   [facility] MASONIC LAB DRAW   10:15 AM   (15 min.)    [facility] LAB [facility]RAW   [facility] 2     3     [facility] RETURN    2:35 PM   (20 min.)   Beka Soto MD   [facility] Ear Nose and Throat 4     PE EYE/ ONCOLOGY   10:30 AM   (45 min.)   [id number]   [facility] PET CT   5     6     7     [facility] MASONIC LAB DRAW    9:00  AM   (15 min.)    [facility]IC LAB DRAW   [facility]     [facility] BONE MARROW BIOPSY    9:15 AM   (90 min.)   Lita Velez PA-C   [facility] 8     9     [facility] ONC RETURN    8:30 AM   (30 min.)   Girish Narayanan MD   [facility] Blood and Marrow Transplant     LAB WITH HB CLINIC   10:30 AM   (15 min.)    LAB   [facility] Lab 10     UMP ONC INFUSION 360    8:00 AM   (360 min.)    ONCOLOGY INFUSION   [facility] 11       12     13     14     15     16     SPINE INITIAL   12:35 PM   (40 min.)   Lanie Fernández, PT   [facility] Physical Therapy [facility] 17     [facility] MASONIC LAB DRAW    7:30 AM   (15 min.)    MASONIC LAB DRAW   [facility]     [facility] ONC INFUSION 360    8:00 AM   (360 min.)    ONCOLOGY INFUSION   [facility] 18       19     20     21     22     23     24     [facility] MASONIC LAB DRAW    9:30 AM   (15 min.)    MASONIC LAB DRAW   [facility]     [facility] ONC INFUSION 360   10:00 AM   (360 min.)    ONCOLOGY INFUSION   [facility] 25     UMP NEW ENDOCRINE    9:45 AM   (60 min.)   Provider,  Med Diab Endo   [facility] Endocrinology   26     27     28 March 2017 Sunday Monday Tuesday Wednesday Thursday Friday Saturday                  1     SPINE FOLLOW UP    4:00 PM   (40 min.)   Lanie Fernández, PT   [facility] Physical Therapy [facility] 2     [facility] MASONIC LAB DRAW    4:00 PM   (15 min.)    MASONIC LAB DRAW   [facility] Lab Draw     [facility] ONC RETURN    4:30 PM   (30 min.)   Girish Narayanan MD   [facility] Blood and Marrow Transplant 3     UMP ONC INFUSION 360   10:00 AM   (360 min.)    ONCOLOGY INFUSION   [facility] 4       5     6     7     8     9     10     11       12     13     14     15     16     17     18       19     20     21     22     23     24     25       26     27     28     29     30     31                       Lab  Results:  Recent Results (from the past 12 hour(s))   CBC with platelets differential    Collection Time: 02/17/17  7:55 AM   Result Value Ref Range    WBC 5.3 4.0 - 11.0 10e9/L    RBC Count 4.66 4.4 - 5.9 10e12/L    Hemoglobin 13.7 13.3 - 17.7 g/dL    Hematocrit 41.2 40.0 - 53.0 %    MCV 88 78 - 100 fl    MCH 29.4 26.5 - 33.0 pg    MCHC 33.3 31.5 - 36.5 g/dL    RDW 13.1 10.0 - 15.0 %    Platelet Count 99 (L) 150 - 450 10e9/L    Diff Method Automated Method     % Neutrophils 78.6 %    % Lymphocytes 11.0 %    % Monocytes 8.3 %    % Eosinophils 1.1 %    % Basophils 0.4 %    % Immature Granulocytes 0.6 %    Nucleated RBCs 0 0 /100    Absolute Neutrophil 4.2 1.6 - 8.3 10e9/L    Absolute Lymphocytes 0.6 (L) 0.8 - 5.3 10e9/L    Absolute Monocytes 0.4 0.0 - 1.3 10e9/L    Absolute Eosinophils 0.1 0.0 - 0.7 10e9/L    Absolute Basophils 0.0 0.0 - 0.2 10e9/L    Abs Immature Granulocytes 0.0 0 - 0.4 10e9/L    Absolute Nucleated RBC 0.0    Basic metabolic panel    Collection Time: 02/17/17  7:55 AM   Result Value Ref Range    Sodium 141 133 - 144 mmol/L    Potassium 4.0 3.4 - 5.3 mmol/L    Chloride 108 94 - 109 mmol/L    Carbon Dioxide 24 20 - 32 mmol/L    Anion Gap 10 3 - 14 mmol/L    Glucose 99 70 - 99 mg/dL    Urea Nitrogen 18 7 - 30 mg/dL    Creatinine 1.08 0.66 - 1.25 mg/dL    GFR Estimate 77 >60 mL/min/1.7m2    GFR Estimate If Black >90   GFR Calc   >60 mL/min/1.7m2    Calcium 8.8 8.5 - 10.1 mg/dL   Phosphorus    Collection Time: 02/17/17  7:55 AM   Result Value Ref Range    Phosphorus 2.6 2.5 - 4.5 mg/dL   Magnesium    Collection Time: 02/17/17  7:55 AM   Result Value Ref Range    Magnesium 2.1 1.6 - 2.3 mg/dL   Hepatic panel    Collection Time: 02/17/17  7:55 AM   Result Value Ref Range    Bilirubin Direct 0.2 0.0 - 0.2 mg/dL    Bilirubin Total 0.6 0.2 - 1.3 mg/dL    Albumin 4.0 3.4 - 5.0 g/dL    Protein Total 6.9 6.8 - 8.8 g/dL    Alkaline Phosphatase 94 40 - 150 U/L    ALT 21 0 - 70 U/L    AST 14 0 - 45  U/L             Follow-ups after your visit        Your next 10 appointments already scheduled     Feb 24, 2017  9:30 AM CST   Masonic Lab Draw with UC MASONIC LAB DRAW   [facility] Masonic Lab Draw ([facility])    [address]  [address] [facility] [address]   [phone]            Feb 24, 2017 10:00 AM CST   Infusion 360 with UC ONCOLOGY INFUSION, UC 29 ATC   [facility] ([facility])    [address]  [address] [facility] [address]   [phone]            Feb 25, 2017 10:00 AM CST   (Arrive by 9:45 AM)   NEW ENDOCRINE with  Med Diab Endo Provider   [facility] Endocrinology ([facility])    [address] [place] [address]  [address] [facility] [address]   [phone]            Mar 01, 2017  4:00 PM CST   [facility]A Spine with Lanie Fernández PT   [facility] Physical Therapy [facility] ([facility])    [address] [place] [address]  5th Floor  [facility] [address]   [phone]            Mar 02, 2017  4:00 PM CST   Masonic Lab Draw with UC MASONIC LAB DRAW   [facility] Masonic Lab Draw ([facility])    [address]  [address] [facility] [address]   [phone]            Mar 02, 2017  4:30 PM CST   RETURN ONC with Girish Narayanan MD   [facility] Blood and Marrow Transplant ([facility])    [address] [place] [address]  [address] [facility] [address]   [phone]            Mar 03, 2017 10:00 AM CST   Infusion 360 with UC ONCOLOGY INFUSION, UC 17 ATC   [facility] ([facility])    [address] [place] [address]  2nd Floor  [facility] [address]   [phone]            Apr 04, 2017 12:10 PM CDT   (Arrive by 11:55 AM)   Return General Liver with Laureen Galvan MD   [facility] Hepatology ([facility])    [address] [place] [address]  [address] [facility] [address]    [phone]              Who to contact     If you have questions or need follow up information about today's clinic visit or your schedule please contact [facility]ER [facility] directly at [phone].  Normal or non-critical lab and imaging results will be communicated to you by MyChart, letter or phone within 4 business days after the clinic has received the results. If you do not hear from us within 7 days, please contact the clinic through MyChart or phone. If you have a critical or abnormal lab result, we will notify you by phone as soon as possible.  Submit refill requests through [employer] or call your pharmacy and they will forward the refill request to us. Please allow 3 business days for your refill to be completed.          Additional Information About Your Visit        [employer] Information     [employer] gives you secure access to your electronic health record. If you see a primary care provider, you can also send messages to your care team and make appointments. If you have questions, please call your primary care clinic.  If you do not have a primary care provider, please call [phone] and they will assist you.        Care EveryWhere ID     This is your Care EveryWhere ID. This could be used by other organizations to access your [place] medical records  [id number]        Your Vitals Were     Pulse Temperature Respirations Pulse Oximetry BMI (Body Mass Index)       77 97.6  F (36.4  C) (Oral) 16 100% 28.04 kg/m2        Blood Pressure from Last 3 Encounters:   02/17/17 131/76   02/10/17 140/74   02/09/17 120/72    Weight from Last 3 Encounters:   02/17/17 89.9 kg (198 lb 1.6 oz)   02/09/17 89.4 kg (197 lb 1.5 oz)   02/07/17 89.2 kg (196 lb 11.2 oz)              We Performed the Following     Basic metabolic panel     CBC with platelets differential     Hepatic panel     Magnesium     Phosphorus     Tacrolimus level        Primary Care Provider Office Phone # Fax #    Eugenia Perez,  [phone]       [address]  [facility] [address]        Thank you!     Thank you for choosing [facility]  for your care. Our goal is always to provide you with excellent care. Hearing back from our patients is one way we can continue to improve our services. Please take a few minutes to complete the written survey that you may receive in the mail after your visit with us. Thank you!             Your Updated Medication List - Protect others around you: Learn how to safely use, store and throw away your medicines at www.disposemymeds.org.          This list is accurate as of: 2/17/17 12:22 PM.  Always use your most recent med list.                   Brand Name Dispense Instructions for use    amLODIPine 10 MG tablet    NORVASC    90 tablet    Take 1 tablet (10 mg) by mouth daily       lisinopril 5 MG tablet    PRINIVIL/ZESTRIL    90 tablet    Take 1 tablet (5 mg) by mouth daily       magnesium oxide 400 (241.3 MG) MG tablet    MAG-OX    120 tablet    Take 2 tablets (800 mg) by mouth 2 times daily       mycophenolate 250 MG capsule    CELLCEPT - GENERIC EQUIVALENT    60 capsule    Take 1 capsule (250 mg) by mouth 2 times daily       sulfamethoxazole-trimethoprim 400-80 MG per tablet    BACTRIM/SEPTRA    30 tablet    Take 1 tablet by mouth daily       tacrolimus capsule     180 capsule    Take 3 capsules (3 mg) by mouth 2 times daily       traMADol 50 MG tablet    ULTRAM    45 tablet    Take 1 tablet (50 mg) by mouth every 8 hours as needed for moderate pain       vitamin D 2000 UNITS tablet     100 tablet    Take 2,000 Units by mouth daily

## 2017-02-17 NOTE — PATIENT INSTRUCTIONS
Contact Numbers  [facility] Nurse Triage: [phone]  After Hours Nurse Line:  [phone]  Hospital Main Line:  [phone]    Please call the [facility] Triage line if you experience a temperature greater than or equal to 100.5, shaking chills, have uncontrolled nausea, vomiting and/or diarrhea, dizziness, shortness of breath, chest pain, bleeding, unexplained bruising, or if you have any other new/concerning symptoms, questions or concerns.     If it is after hours, weekends, or holidays, please call either the after hours nurse line listed above or the main hospital  at  [phone] and ask to speak to the Oncology doctor on call.     If you are having any concerning symptoms or wish to speak to a provider before your next infusion visit, please call your care coordinator or triage to notify them so we can adequately serve you.     If you need a refill on a narcotic prescription or other medication, please call triage before your infusion appointment.         February 2017 Sunday Monday Tuesday Wednesday Thursday Friday Saturday                  1     P NEW    8:45 AM   (60 min.)   Ileana Reddy MD   [facility] LAB DRAW   10:15 AM   (15 min.)   [facility]   [facility] Lab Draw 2     3     [facility] RETURN    2:35 PM   (20 min.)   Beka Soto MD   [facility] Ear Nose and Throat 4     PE EYE/ ONCOLOGY   10:30 AM   (45 min.)   UUPET1   [facility] [place] PET CT   5     6     7     [facility] LAB DRAW    9:00 AM   (15 min.)   [facility]   [facility] Lab Draw     [facility] BONE MARROW BIOPSY    9:15 AM   (90 min.)   Lita Velez PA-C   [facility] 8     9     [facility] ONC RETURN    8:30 AM   (30 min.)   Girish Narayanan MD   [facility] Blood and Marrow Transplant     LAB WITH  CLINIC   10:30 AM   (15 min.)    LAB   [facility] Lab 10     [facility] ONC INFUSION 360    8:00 AM   (360 min.)    ONCOLOGY INFUSION    [facility] 11       12     13     14     15     16     SPINE INITIAL   12:35 PM   (40 min.)   Lanie Fernández, PT   [facility] Physical Therapy [facility] 17     [facility] DRAW    7:30 AM   (15 min.)    [facility] DRAW   [facility] Draw     [facility] ONC INFUSION 360    8:00 AM   (360 min.)    ONCOLOGY INFUSION   [facility] 18       19     20     21     22     23     24     [facility] DRAW    9:30 AM   (15 min.)    [facility] DRAW   [facility] Draw     [facility] ONC INFUSION 360   10:00 AM   (360 min.)    ONCOLOGY INFUSION   [facility] 25     [facility] NEW ENDOCRINE    9:45 AM   (60 min.)   Provider,  Med Diab Endo   [facility] Endocrinology   26 27 28 March 2017 Sunday Monday Tuesday Wednesday Thursday Friday Saturday                  1     SPINE FOLLOW UP    4:00 PM   (40 min.)   Lanie Fernández, PT   [facility] Physical Therapy [facility] 2     [facility] DRAW    4:00 PM   (15 min.)    [facility] DRAW   [facility] Lab Draw     P ONC RETURN    4:30 PM   (30 min.)   Girish Narayanan MD   [facility] Blood and Marrow Transplant 3     [facility] ONC INFUSION 360   10:00 AM   (360 min.)    ONCOLOGY INFUSION   [facility] 4       5     6     7     8     9     10     11       12     13     14     15     16     17     18       19     20     21     22     23     24     25       26     27     28     29     30     31                       Lab Results:  Recent Results (from the past 12 hour(s))   CBC with platelets differential    Collection Time: 02/17/17  7:55 AM   Result Value Ref Range    WBC 5.3 4.0 - 11.0 10e9/L    RBC Count 4.66 4.4 - 5.9 10e12/L    Hemoglobin 13.7 13.3 - 17.7 g/dL    Hematocrit 41.2 40.0 - 53.0 %    MCV 88 78 - 100 fl    MCH 29.4 26.5 - 33.0 pg    MCHC 33.3 31.5 - 36.5 g/dL    RDW 13.1 10.0 - 15.0 %    Platelet Count 99 (L) 150 - 450 10e9/L    Diff Method  Automated Method     % Neutrophils 78.6 %    % Lymphocytes 11.0 %    % Monocytes 8.3 %    % Eosinophils 1.1 %    % Basophils 0.4 %    % Immature Granulocytes 0.6 %    Nucleated RBCs 0 0 /100    Absolute Neutrophil 4.2 1.6 - 8.3 10e9/L    Absolute Lymphocytes 0.6 (L) 0.8 - 5.3 10e9/L    Absolute Monocytes 0.4 0.0 - 1.3 10e9/L    Absolute Eosinophils 0.1 0.0 - 0.7 10e9/L    Absolute Basophils 0.0 0.0 - 0.2 10e9/L    Abs Immature Granulocytes 0.0 0 - 0.4 10e9/L    Absolute Nucleated RBC 0.0    Basic metabolic panel    Collection Time: 02/17/17  7:55 AM   Result Value Ref Range    Sodium 141 133 - 144 mmol/L    Potassium 4.0 3.4 - 5.3 mmol/L    Chloride 108 94 - 109 mmol/L    Carbon Dioxide 24 20 - 32 mmol/L    Anion Gap 10 3 - 14 mmol/L    Glucose 99 70 - 99 mg/dL    Urea Nitrogen 18 7 - 30 mg/dL    Creatinine 1.08 0.66 - 1.25 mg/dL    GFR Estimate 77 >60 mL/min/1.7m2    GFR Estimate If Black >90   GFR Calc   >60 mL/min/1.7m2    Calcium 8.8 8.5 - 10.1 mg/dL   Phosphorus    Collection Time: 02/17/17  7:55 AM   Result Value Ref Range    Phosphorus 2.6 2.5 - 4.5 mg/dL   Magnesium    Collection Time: 02/17/17  7:55 AM   Result Value Ref Range    Magnesium 2.1 1.6 - 2.3 mg/dL   Hepatic panel    Collection Time: 02/17/17  7:55 AM   Result Value Ref Range    Bilirubin Direct 0.2 0.0 - 0.2 mg/dL    Bilirubin Total 0.6 0.2 - 1.3 mg/dL    Albumin 4.0 3.4 - 5.0 g/dL    Protein Total 6.9 6.8 - 8.8 g/dL    Alkaline Phosphatase 94 40 - 150 U/L    ALT 21 0 - 70 U/L    AST 14 0 - 45 U/L

## 2017-02-17 NOTE — PROGRESS NOTES
Infusion Nursing Note:  Cricket Chen presents today for D8 C1 Rituxan.    Patient seen by provider today: No    Intravenous Access:  Peripheral IV placed by vascular access with ultrasound.    Treatment Conditions:  Lab Results   Component Value Date    HGB 13.7 02/17/2017     Lab Results   Component Value Date    WBC 5.3 02/17/2017      Lab Results   Component Value Date    ANEU 4.2 02/17/2017     Lab Results   Component Value Date    PLT 99 02/17/2017      Lab Results   Component Value Date     02/17/2017                   Lab Results   Component Value Date    POTASSIUM 4.0 02/17/2017           Lab Results   Component Value Date    MAG 2.1 02/17/2017            Lab Results   Component Value Date    CR 1.08 02/17/2017                   Lab Results   Component Value Date    COLBY 8.8 02/17/2017                Lab Results   Component Value Date    BILITOTAL 0.6 02/17/2017           Lab Results   Component Value Date    ALBUMIN 4.0 02/17/2017                    Lab Results   Component Value Date    ALT 21 02/17/2017           Lab Results   Component Value Date    AST 14 02/17/2017     Results reviewed, labs MET treatment parameters, ok to proceed with treatment.    Note: Patient presents to infusion for above regimen. Reports feeling well overall. Endorses a cough that is not new and without fever. Denied any new issues or concerns.    Rituxan infusion rates:  50mg/hr for 60 minutes  100mg/hr for 60 minutes  150mg/hr for 60 minutes  200mg/hr for 60 minutes  250mg/hr for 60 minutes  300mg/hr for 60 minutes  350mg/hr for 60 minutes  400mg/hr for 60 minutes      Post Infusion Assessment:  Patient tolerated infusion without incident.  Blood return noted pre and post infusion.  Site patent and intact, free from redness, edema or discomfort.  No evidence of extravasations.  Access discontinued per protocol.    Discharge Plan:   Patient declined prescription refills.  Discharge instructions reviewed with:  Patient.  Patient and/or family verbalized understanding of discharge instructions and all questions answered.  Copy of AVS reviewed with patient and/or family.  Patient will return 2/24/17 for next appointment.  Patient discharged in stable condition accompanied by: self.  Departure Mode: Ambulatory.    Melissa Quispe RN

## 2017-02-17 NOTE — NURSING NOTE
Chief Complaint   Patient presents with     Blood Draw     Labs drawn by RN from new PIV. VS taken.      PIV placed by vascular access RN with ultrasound in L forearm. Labs drawn from PIV and saline locked.  Tamara Osborn RN

## 2017-02-20 LAB — COPATH REPORT: NORMAL

## 2017-02-21 LAB — COPATH REPORT: NORMAL

## 2017-02-22 LAB — COPATH REPORT: NORMAL

## 2017-02-24 ENCOUNTER — INFUSION THERAPY VISIT (OUTPATIENT)
Dept: ONCOLOGY | Facility: CLINIC | Age: 37
End: 2017-02-24
Attending: INTERNAL MEDICINE
Payer: COMMERCIAL

## 2017-02-24 VITALS
TEMPERATURE: 97.8 F | BODY MASS INDEX: 28.43 KG/M2 | DIASTOLIC BLOOD PRESSURE: 84 MMHG | SYSTOLIC BLOOD PRESSURE: 135 MMHG | HEART RATE: 76 BPM | WEIGHT: 200.84 LBS

## 2017-02-24 DIAGNOSIS — Z94.4 LIVER REPLACED BY TRANSPLANT (H): ICD-10-CM

## 2017-02-24 DIAGNOSIS — D47.Z1 POST-TRANSPLANT LYMPHOPROLIFERATIVE DISORDER (H): Primary | ICD-10-CM

## 2017-02-24 LAB
ALBUMIN SERPL-MCNC: 4 G/DL (ref 3.4–5)
ALP SERPL-CCNC: 86 U/L (ref 40–150)
ALT SERPL W P-5'-P-CCNC: 19 U/L (ref 0–70)
ANION GAP SERPL CALCULATED.3IONS-SCNC: 10 MMOL/L (ref 3–14)
AST SERPL W P-5'-P-CCNC: 13 U/L (ref 0–45)
BASOPHILS # BLD AUTO: 0 10E9/L (ref 0–0.2)
BASOPHILS NFR BLD AUTO: 0.4 %
BILIRUB DIRECT SERPL-MCNC: 0.2 MG/DL (ref 0–0.2)
BILIRUB SERPL-MCNC: 0.8 MG/DL (ref 0.2–1.3)
BUN SERPL-MCNC: 18 MG/DL (ref 7–30)
CALCIUM SERPL-MCNC: 8.5 MG/DL (ref 8.5–10.1)
CHLORIDE SERPL-SCNC: 108 MMOL/L (ref 94–109)
CO2 SERPL-SCNC: 22 MMOL/L (ref 20–32)
CREAT SERPL-MCNC: 1 MG/DL (ref 0.66–1.25)
DIFFERENTIAL METHOD BLD: ABNORMAL
EOSINOPHIL # BLD AUTO: 0.1 10E9/L (ref 0–0.7)
EOSINOPHIL NFR BLD AUTO: 1.1 %
ERYTHROCYTE [DISTWIDTH] IN BLOOD BY AUTOMATED COUNT: 13.2 % (ref 10–15)
GFR SERPL CREATININE-BSD FRML MDRD: 84 ML/MIN/1.7M2
GLUCOSE SERPL-MCNC: 89 MG/DL (ref 70–99)
HCT VFR BLD AUTO: 40 % (ref 40–53)
HGB BLD-MCNC: 13.5 G/DL (ref 13.3–17.7)
IMM GRANULOCYTES # BLD: 0 10E9/L (ref 0–0.4)
IMM GRANULOCYTES NFR BLD: 0.4 %
LYMPHOCYTES # BLD AUTO: 0.7 10E9/L (ref 0.8–5.3)
LYMPHOCYTES NFR BLD AUTO: 11.4 %
MAGNESIUM SERPL-MCNC: 2 MG/DL (ref 1.6–2.3)
MCH RBC QN AUTO: 29.3 PG (ref 26.5–33)
MCHC RBC AUTO-ENTMCNC: 33.8 G/DL (ref 31.5–36.5)
MCV RBC AUTO: 87 FL (ref 78–100)
MONOCYTES # BLD AUTO: 0.5 10E9/L (ref 0–1.3)
MONOCYTES NFR BLD AUTO: 8.4 %
NEUTROPHILS # BLD AUTO: 4.5 10E9/L (ref 1.6–8.3)
NEUTROPHILS NFR BLD AUTO: 78.3 %
NRBC # BLD AUTO: 0 10*3/UL
NRBC BLD AUTO-RTO: 0 /100
PHOSPHATE SERPL-MCNC: 2.7 MG/DL (ref 2.5–4.5)
PLATELET # BLD AUTO: 81 10E9/L (ref 150–450)
POTASSIUM SERPL-SCNC: 4 MMOL/L (ref 3.4–5.3)
PROT SERPL-MCNC: 7.1 G/DL (ref 6.8–8.8)
RBC # BLD AUTO: 4.61 10E12/L (ref 4.4–5.9)
SODIUM SERPL-SCNC: 140 MMOL/L (ref 133–144)
TACROLIMUS BLD-MCNC: 6.9 UG/L (ref 5–15)
TME LAST DOSE: NORMAL H
WBC # BLD AUTO: 5.7 10E9/L (ref 4–11)

## 2017-02-24 PROCEDURE — 80076 HEPATIC FUNCTION PANEL: CPT | Performed by: INTERNAL MEDICINE

## 2017-02-24 PROCEDURE — 25000128 H RX IP 250 OP 636: Mod: ZF | Performed by: INTERNAL MEDICINE

## 2017-02-24 PROCEDURE — 80197 ASSAY OF TACROLIMUS: CPT | Performed by: INTERNAL MEDICINE

## 2017-02-24 PROCEDURE — 84100 ASSAY OF PHOSPHORUS: CPT | Performed by: INTERNAL MEDICINE

## 2017-02-24 PROCEDURE — 96415 CHEMO IV INFUSION ADDL HR: CPT

## 2017-02-24 PROCEDURE — 85025 COMPLETE CBC W/AUTO DIFF WBC: CPT | Performed by: INTERNAL MEDICINE

## 2017-02-24 PROCEDURE — 80048 BASIC METABOLIC PNL TOTAL CA: CPT | Performed by: INTERNAL MEDICINE

## 2017-02-24 PROCEDURE — 25000132 ZZH RX MED GY IP 250 OP 250 PS 637: Mod: ZF | Performed by: INTERNAL MEDICINE

## 2017-02-24 PROCEDURE — 96413 CHEMO IV INFUSION 1 HR: CPT

## 2017-02-24 PROCEDURE — 83735 ASSAY OF MAGNESIUM: CPT | Performed by: INTERNAL MEDICINE

## 2017-02-24 RX ORDER — DIPHENHYDRAMINE HCL 25 MG
50 CAPSULE ORAL ONCE
Status: COMPLETED | OUTPATIENT
Start: 2017-02-24 | End: 2017-02-24

## 2017-02-24 RX ORDER — ACETAMINOPHEN 325 MG/1
650 TABLET ORAL ONCE
Status: COMPLETED | OUTPATIENT
Start: 2017-02-24 | End: 2017-02-24

## 2017-02-24 RX ADMIN — DIPHENHYDRAMINE HYDROCHLORIDE 50 MG: 25 CAPSULE ORAL at 10:37

## 2017-02-24 RX ADMIN — ACETAMINOPHEN 650 MG: 325 TABLET ORAL at 10:37

## 2017-02-24 RX ADMIN — RITUXIMAB 800 MG: 10 INJECTION, SOLUTION INTRAVENOUS at 11:02

## 2017-02-24 RX ADMIN — SODIUM CHLORIDE 250 ML: 9 INJECTION, SOLUTION INTRAVENOUS at 11:02

## 2017-02-24 ASSESSMENT — PAIN SCALES - GENERAL: PAINLEVEL: NO PAIN (0)

## 2017-02-24 NOTE — PROGRESS NOTES
Infusion Nursing Note:  Cricket Chen presents for C1D15 Rituxan    Note: pt feeling well today, no new issues or concerns to report.    Rituxan infused at the following rates-  100ml/hour X30 minutes  200ml/hour X30 minutes  300ml/hour X30 minutes  400ml/hour for the remainder    Treatment Conditions:  Lab Results   Component Value Date    HGB 13.5 02/24/2017     Lab Results   Component Value Date    WBC 5.7 02/24/2017      Lab Results   Component Value Date    ANEU 4.5 02/24/2017     Lab Results   Component Value Date    PLT 81 02/24/2017      Lab Results   Component Value Date     02/24/2017                   Lab Results   Component Value Date    POTASSIUM 4.0 02/24/2017           Lab Results   Component Value Date    MAG 2.0 02/24/2017            Lab Results   Component Value Date    CR 1.00 02/24/2017                   Lab Results   Component Value Date    COLBY 8.5 02/24/2017                Lab Results   Component Value Date    BILITOTAL 0.8 02/24/2017           Lab Results   Component Value Date    ALBUMIN 4.0 02/24/2017                    Lab Results   Component Value Date    ALT 19 02/24/2017           Lab Results   Component Value Date    AST 13 02/24/2017     Results reviewed, labs MET treatment parameters, ok to proceed with treatment.    Intravenous Access:  Peripheral IV placed in lab.    Post Infusion Assessment:  Patient tolerated infusion without incident.  Blood return noted pre and post infusion.  No evidence of extravasations.  Access discontinued per protocol.    Discharge Plan:   Patient declined prescription refills.  Discharge instructions reviewed with: Patient.  Patient and/or family verbalized understanding of discharge instructions and all questions answered.  AVS to patient via [employer].  Patient will return 3/2 for next appointment.   Patient discharged in stable condition accompanied by: self.    Viki Tariq RN

## 2017-02-24 NOTE — MR AVS SNAPSHOT
After Visit Summary   2/24/2017    Cricket Chen    MRN: [id number]           Patient Information     Date Of Birth          1980        Visit Information        Provider Department      2/24/2017 10:00 AM  29 ATC;  ONCOLOGY INFUSION [facility]        Today's Diagnoses     Post-transplant lymphoproliferative disorder (H)    -  1    Liver replaced by transplant (H)          Care Instructions    Contact numbers:  Triage Main Line: [phone]  After hours: [phone]    Call with chills and/or temperature greater than or equal to 100.5 and questions or concerns.    If after hours, weekends, or holidays, call main hospital  at  [phone] and ask for Oncology doctor on call.           February 2017 Sunday Monday Tuesday Wednesday Thursday Friday Saturday                  1     [facility] NEW    8:45 AM   (60 min.)   Ileana Reddy MD   [facility] DRAW   10:15 AM   (15 min.)    [facility] LAB DRAW   [facility] Lab Draw 2     3     [facility] RETURN    2:35 PM   (20 min.)   Beka Soto MD   [facility] Ear Nose and Throat 4     PE EYE/TH ONCOLOGY   10:30 AM   (45 min.)   UUPET1   [facility] [place] PET CT   5     6     7     [facility] LAB DRAW    9:00 AM   (15 min.)   [facility]   [facility] Lab Draw     [facility] BONE MARROW BIOPSY    9:15 AM   (90 min.)   Lita Velez PA-C   [facility] 8     9     [facility] ONC RETURN    8:30 AM   (30 min.)   Girish Narayanan MD   [facility] Blood and Marrow Transplant     LAB WITH HB CLINIC   10:30 AM   (15 min.)    LAB   [facility] Lab 10     [facility] ONC INFUSION 360    8:00 AM   (360 min.)    ONCOLOGY INFUSION   [facility] 11       12     13     14     15     16     SPINE INITIAL   12:35 PM   (40 min.)   Lanie Fernández, PT   [facility] Physical Therapy [facility] 17     [facility] LAB DRAW    7:30 AM   (15 min.)    [facility] LAB DRAW   [facility]  [facility] Lab Draw     [facility] ONC INFUSION 360    8:00 AM   (360 min.)    ONCOLOGY INFUSION   [facility] 18       19     20     21     22     23     24     [facility] DRAW    9:30 AM   (15 min.)    [facility] LAB DRAW   [facility] Lab Draw     [facility] ONC INFUSION 360   10:00 AM   (360 min.)    ONCOLOGY INFUSION   [facility] 25     [facility] NEW ENDOCRINE    9:45 AM   (60 min.)   Provider, Destiny Med Diab Endo   [facility] Endocrinology   26     27     28 March 2017 Sunday Monday Tuesday Wednesday Thursday Friday Saturday                  1     SPINE FOLLOW UP    4:00 PM   (40 min.)   Lanie Fernández, PT   [facility] Physical Therapy PRIS[facility]A 2     [facility] LAB DRAW    4:00 PM   (15 min.)    [facility]   [facility]     [facility] ONC RETURN    4:30 PM   (30 min.)   Girish Narayanan MD   [facility] Blood and Marrow Transplant 3     [facility] ONC INFUSION 360   10:00 AM   (360 min.)    ONCOLOGY INFUSION   [facility] 4       5     6     7     8     9     10     11       12     13     14     15     16     17     18       19     20     21     22     23     24     25       26     27     28     29     30     31                       Lab Results:  Recent Results (from the past 12 hour(s))   CBC with platelets differential    Collection Time: 02/24/17 10:13 AM   Result Value Ref Range    WBC 5.7 4.0 - 11.0 10e9/L    RBC Count 4.61 4.4 - 5.9 10e12/L    Hemoglobin 13.5 13.3 - 17.7 g/dL    Hematocrit 40.0 40.0 - 53.0 %    MCV 87 78 - 100 fl    MCH 29.3 26.5 - 33.0 pg    MCHC 33.8 31.5 - 36.5 g/dL    RDW 13.2 10.0 - 15.0 %    Platelet Count 81 (L) 150 - 450 10e9/L    Diff Method Automated Method     % Neutrophils 78.3 %    % Lymphocytes 11.4 %    % Monocytes 8.4 %    % Eosinophils 1.1 %    % Basophils 0.4 %    % Immature Granulocytes 0.4 %    Nucleated RBCs 0 0 /100    Absolute Neutrophil 4.5 1.6 - 8.3 10e9/L    Absolute  Lymphocytes 0.7 (L) 0.8 - 5.3 10e9/L    Absolute Monocytes 0.5 0.0 - 1.3 10e9/L    Absolute Eosinophils 0.1 0.0 - 0.7 10e9/L    Absolute Basophils 0.0 0.0 - 0.2 10e9/L    Abs Immature Granulocytes 0.0 0 - 0.4 10e9/L    Absolute Nucleated RBC 0.0              Follow-ups after your visit        Your next 10 appointments already scheduled     Feb 25, 2017 10:00 AM CST   (Arrive by 9:45 AM)   NEW ENDOCRINE with  Med Diab Endo Provider   [facility] ([facility]    [address] [place] [address]  3rd Floor  [facility] [id number] [phone]            Mar 01, 2017  4:00 PM CST   [facility] Spine with Lanie Fernández PT   [facility] ([facility])    [address] [place] [address]  5th Floor  [facility] [address]   [phone]            Mar 02, 2017  4:00 PM CST   [facility] Lab Draw with  [facility] LAB DRAW   [facility] Lab Draw ([facility])    [address] [place] [address]  [address] [facility] [address]   [phone]            Mar 02, 2017  4:30 PM CST   RETURN ONC with Girish Narayanan MD   [facility] Blood [facility] Marrow Transplant ([facility])    [address] [place] [address]  [address] [facility] [address]   [phone]            Mar 03, 2017 10:00 AM CST   Infusion 360 with  ONCOLOGY INFUSION, UC 17 ATC   [facility] ([facility])    [address] [place] [address]  [address] [facility] [address]   [phone]            Apr 04, 2017 12:10 PM CDT   (Arrive by 11:55 AM)   Return General Liver with Laureen Galvan MD   [facility] ([facility])    [address] [place] [address]  3rd [facility] [address]   [phone]            Apr 18, 2017 10:30 AM CDT   (Arrive by 10:00 AM)   Return Kidney Transplant with Jake Sidhu MD   [facility] ([facility])    [address] [place] [address]  [address]  3rd Floor  [facility] [address] [phone]              Who to contact     If you have questions or need follow up information about today's clinic visit or your schedule please contact [facility] CL[facility]IC directly at [phone].  Normal or non-critical lab and imaging results will be communicated to you by MyChart, letter or phone within 4 business days after the clinic has received the results. If you do not hear from us within 7 days, please contact the clinic through [employer] or phone. If you have a critical or abnormal lab result, we will notify you by phone as soon as possible.  Submit refill requests through [employer] or call your pharmacy and they will forward the refill request to us. Please allow 3 business days for your refill to be completed.          Additional Information About Your Visit        [employer] Information     [employer] gives you secure access to your electronic health record. If you see a primary care provider, you can also send messages to your care team and make appointments. If you have questions, please call your primary care clinic.  If you do not have a primary care provider, please call [phone] and they will assist you.        Care EveryWhere ID     This is your Care EveryWhere ID. This could be used by other organizations to access your [place] medical records  [id number]        Your Vitals Were     Pulse Temperature BMI (Body Mass Index)             76 97.8  F (36.6  C) 28.43 kg/m2          Blood Pressure from Last 3 Encounters:   02/24/17 135/84   02/17/17 131/76   02/10/17 140/74    Weight from Last 3 Encounters:   02/24/17 91.1 kg (200 lb 13.4 oz)   02/17/17 89.9 kg (198 lb 1.6 oz)   02/09/17 89.4 kg (197 lb 1.5 oz)              We Performed the Following     Basic metabolic panel     CBC with platelets differential     Hepatic panel     Magnesium     Phosphorus     Tacrolimus level     Treatment Conditions        Primary Care Provider Office Phone #  Fax #    Eugenia Perez [phone]       [address]  [facility] [address]        Thank you!     Thank you for choosing [facility] CANCER [facility]  for your care. Our goal is always to provide you with excellent care. Hearing back from our patients is one way we can continue to improve our services. Please take a few minutes to complete the written survey that you may receive in the mail after your visit with us. Thank you!             Your Updated Medication List - Protect others around you: Learn how to safely use, store and throw away your medicines at www.disposemymeds.org.          This list is accurate as of: 2/24/17  2:11 PM.  Always use your most recent med list.                   Brand Name Dispense Instructions for use    amLODIPine 10 MG tablet    NORVASC    90 tablet    Take 1 tablet (10 mg) by mouth daily       lisinopril 5 MG tablet    PRINIVIL/ZESTRIL    90 tablet    Take 1 tablet (5 mg) by mouth daily       magnesium oxide 400 (241.3 MG) MG tablet    MAG-OX    120 tablet    Take 2 tablets (800 mg) by mouth 2 times daily       mycophenolate 250 MG capsule    CELLCEPT - GENERIC EQUIVALENT    60 capsule    Take 1 capsule (250 mg) by mouth 2 times daily       sulfamethoxazole-trimethoprim 400-80 MG per tablet    BACTRIM/SEPTRA    30 tablet    Take 1 tablet by mouth daily       tacrolimus capsule     180 capsule    Take 3 capsules (3 mg) by mouth 2 times daily       traMADol 50 MG tablet    ULTRAM    45 tablet    Take 1 tablet (50 mg) by mouth every 8 hours as needed for moderate pain       vitamin D 2000 UNITS tablet     100 tablet    Take 2,000 Units by mouth daily

## 2017-02-24 NOTE — NURSING NOTE
Chief Complaint   Patient presents with     Blood Draw     24g PIV placed in left forearm, blood drawn at time of insertion with good blood return.  Claudia Lazcano RN

## 2017-02-24 NOTE — PATIENT INSTRUCTIONS
Contact numbers:  Triage Main Line: [phone]  After hours: [phone]    Call with chills and/or temperature greater than or equal to 100.5 and questions or concerns.    If after hours, weekends, or holidays, call main hospital  at  [phone] and ask for Oncology doctor on call.           February 2017 Sunday Monday Tuesday Wednesday Thursday Friday Saturday                  1     [facility] NEW    8:45 AM   (60 min.)   Ileana Reddy MD   [facility] LAB DRAW   10:15 AM   (15 min.)    [facility] DRAW   [facility] Lab Draw 2     3     [facility] RETURN    2:35 PM   (20 min.)   Beka Soto MD   [facility] Ear Nose and Throat 4     PE EYE/ ONCOLOGY   10:30 AM   (45 min.)   [id number]   [facility] [place] PET CT   5     6     7     [facility] DRAW    9:00 AM   (15 min.)    [facility] DRAW   [facility] Lab Draw     [facility] BONE MARROW BIOPSY    9:15 AM   (90 min.)   Lita Velez PA-C   [facility] 8     9     [facility] ONC RETURN    8:30 AM   (30 min.)   Girish Narayanan MD   [facility] Blood and Marrow Transplant     LAB WITH HB CLINIC   10:30 AM   (15 min.)    LAB   [facility] Lab 10     [facility] ONC INFUSION 360    8:00 AM   (360 min.)    ONCOLOGY INFUSION   [facility] 11       12     13     14     15     16     SPINE INITIAL   12:35 PM   (40 min.)   Lanie Fernández, PT   [facility] Physical Therapy [facility] 17     [facility] DRAW    7:30 AM   (15 min.)    [facility]   [facility]     P ONC INFUSION 360    8:00 AM   (360 min.)    ONCOLOGY INFUSION   [facility] 18       19     20     21     22     23     24     [facility] DRAW    9:30 AM   (15 min.)    [facility] DRAW   [facility] Lab Draw     P ONC INFUSION 360   10:00 AM   (360 min.)    ONCOLOGY INFUSION   [facility] 25     [facility] NEW ENDOCRINE    9:45 AM   (60 min.)   Provider,  Med Diab  Endo   [facility] Endocrinology   26 27 28 March 2017 Sunday Monday Tuesday Wednesday Thursday Friday Saturday                  1     SPINE FOLLOW UP    4:00 PM   (40 min.)   Lanie Fernández PT   [facility] Physical Therapy [facility] 2     [facility] DRAW    4:00 PM   (15 min.)    [facility] LAB DRAW   [facility] Lab Draw     [facility] ONC RETURN    4:30 PM   (30 min.)   Girish Narayanan MD   [facility] Blood and Marrow Transplant 3     [facility] ONC INFUSION 360   10:00 AM   (360 min.)    ONCOLOGY INFUSION   [facility] 4       5     6     7     8     9     10     11       12     13     14     15     16     17     18       19     20     21     22     23     24     25       26     27     28     29     30     31                       Lab Results:  Recent Results (from the past 12 hour(s))   CBC with platelets differential    Collection Time: 02/24/17 10:13 AM   Result Value Ref Range    WBC 5.7 4.0 - 11.0 10e9/L    RBC Count 4.61 4.4 - 5.9 10e12/L    Hemoglobin 13.5 13.3 - 17.7 g/dL    Hematocrit 40.0 40.0 - 53.0 %    MCV 87 78 - 100 fl    MCH 29.3 26.5 - 33.0 pg    MCHC 33.8 31.5 - 36.5 g/dL    RDW 13.2 10.0 - 15.0 %    Platelet Count 81 (L) 150 - 450 10e9/L    Diff Method Automated Method     % Neutrophils 78.3 %    % Lymphocytes 11.4 %    % Monocytes 8.4 %    % Eosinophils 1.1 %    % Basophils 0.4 %    % Immature Granulocytes 0.4 %    Nucleated RBCs 0 0 /100    Absolute Neutrophil 4.5 1.6 - 8.3 10e9/L    Absolute Lymphocytes 0.7 (L) 0.8 - 5.3 10e9/L    Absolute Monocytes 0.5 0.0 - 1.3 10e9/L    Absolute Eosinophils 0.1 0.0 - 0.7 10e9/L    Absolute Basophils 0.0 0.0 - 0.2 10e9/L    Abs Immature Granulocytes 0.0 0 - 0.4 10e9/L    Absolute Nucleated RBC 0.0

## 2017-02-25 ENCOUNTER — OFFICE VISIT (OUTPATIENT)
Dept: ENDOCRINOLOGY | Facility: CLINIC | Age: 37
End: 2017-02-25

## 2017-02-25 VITALS
DIASTOLIC BLOOD PRESSURE: 83 MMHG | WEIGHT: 200.1 LBS | SYSTOLIC BLOOD PRESSURE: 130 MMHG | HEIGHT: 74 IN | HEART RATE: 82 BPM | BODY MASS INDEX: 25.68 KG/M2

## 2017-02-25 DIAGNOSIS — E04.1 THYROID NODULE: ICD-10-CM

## 2017-02-25 DIAGNOSIS — C83.398 DIFFUSE LARGE B-CELL LYMPHOMA OF EXTRANODAL SITE: ICD-10-CM

## 2017-02-25 DIAGNOSIS — D47.Z1 POST-TRANSPLANT LYMPHOPROLIFERATIVE DISORDER (H): ICD-10-CM

## 2017-02-25 DIAGNOSIS — Z94.0 S/P KIDNEY TRANSPLANT: ICD-10-CM

## 2017-02-25 DIAGNOSIS — Z94.4 S/P LIVER TRANSPLANT (H): ICD-10-CM

## 2017-02-25 ASSESSMENT — PAIN SCALES - GENERAL: PAINLEVEL: NO PAIN (0)

## 2017-02-25 NOTE — MR AVS SNAPSHOT
After Visit Summary   2/25/2017    Cricket Chen    MRN: [id number]           Patient Information     Date Of Birth          1980        Visit Information        Provider Department      2/25/2017 10:00 AM Provider, [facility] Med Diab Endo [facility] Endocrinology        Today's Diagnoses     Thyroid nodule        Post-transplant lymphoproliferative disorder (H)        Diffuse large B-cell lymphoma of extranodal site (H)        S/P liver transplant (H)        S/P kidney transplant           Follow-ups after your visit        Your next 10 appointments already scheduled     Mar 01, 2017  4:00 PM CST   [facility] Spine with Lanie Fernández PT   [facility] Physical Therapy PR[facility] ([facility])    [address]  5th [address]  [facility] [id number] [phone]            Mar 02, 2017  3:00 PM CST   US THYROID with UCUS3   [facility] Imaging Center US ([facility])    [address]  1st [address]  [facility] [id number] [phone]           Please bring a list of your medicines (including vitamins, minerals and over-the-counter drugs). Also, tell your doctor about any allergies you may have. Wear comfortable clothes and leave your valuables at home.  You do not need to do anything special to prepare for your exam.  Please call the Imaging Department at your exam site with any questions.            Mar 02, 2017  4:00 PM CST   [facility] Lab Draw with  [facility] LAB DRAW   [facility] Lab Draw ([facility])    [address]  [address] [facility] [address]   [phone]            Mar 02, 2017  4:30 PM CST   RETURN ONC with Girish Narayanan MD   [facility] Blood and Marrow Transplant ([facility])    [address]  [address] [facility] [address]   [phone]            Mar 03, 2017 10:00 AM CST   Infusion 360 with MARCIO ONCOLOGY INFUSION, UC 17 ATC   [facility] Cancer [facility] (  [facility])    [address]  [address] [place] [facility] [address]   [phone]            Apr 04, 2017 12:10 PM CDT   (Arrive by 11:55 AM)   Return General Liver with Laureen Galvan MD   [facility] Hepatology ([facility])    [address]  [address] [place] [facility] [address] [phone]            Apr 18, 2017 10:30 AM CDT   (Arrive by 10:00 AM)   Return Kidney Transplant with Jake Sidhu MD   [facility] Nephrology ([facility])    [address]  [address] [place] [facility] [id number] [phone]              Who to contact     Please call your clinic at [phone] to:    Ask questions about your health    Make or cancel appointments    Discuss your medicines    Learn about your test results    Speak to your doctor   If you have compliments or concerns about an experience at your clinic, or if you wish to file a complaint, please contact [facility] Patient Relations at [phone] or email us at [email or web address]         Additional Information About Your Visit        Information Assurance Information     [employer] gives you secure access to your electronic health record. If you see a primary care provider, you can also send messages to your care team and make appointments. If you have questions, please call your primary care clinic.  If you do not have a primary care provider, please call [phone] and they will assist you.      [employer] is an electronic gateway that provides easy, online access to your medical records. With [employer], you can request a clinic appointment, read your test results, renew a prescription or communicate with your care team.     To access your existing account, please contact your [facility] or call [phone] for assistance.        Care EveryWhere ID     This is your Care EveryWhere ID. This could be used by  "other organizations to access your [place] medical records  [id number]        Your Vitals Were     Pulse Height BMI (Body Mass Index)             82 1.88 m (6' 2\") 25.69 kg/m2          Blood Pressure from Last 3 Encounters:   02/25/17 130/83   02/24/17 135/84   02/17/17 131/76    Weight from Last 3 Encounters:   02/25/17 90.8 kg (200 lb 1.6 oz)   02/24/17 91.1 kg (200 lb 13.4 oz)   02/17/17 89.9 kg (198 lb 1.6 oz)              We Performed the Following     Endocrinology Adult Referral        Primary Care Provider Office Phone # Fax #    Eugenia Perez [phone]       [address] [address]        Thank you!     Thank you for choosing [facility] ENDOCRINOLOGY  for your care. Our goal is always to provide you with excellent care. Hearing back from our patients is one way we can continue to improve our services. Please take a few minutes to complete the written survey that you may receive in the mail after your visit with us. Thank you!             Your Updated Medication List - Protect others around you: Learn how to safely use, store and throw away your medicines at www.disposemymeds.org.          This list is accurate as of: 2/25/17 11:59 PM.  Always use your most recent med list.                   Brand Name Dispense Instructions for use    amLODIPine 10 MG tablet    NORVASC    90 tablet    Take 1 tablet (10 mg) by mouth daily       lisinopril 5 MG tablet    PRINIVIL/ZESTRIL    90 tablet    Take 1 tablet (5 mg) by mouth daily       magnesium oxide 400 (241.3 MG) MG tablet    MAG-OX    120 tablet    Take 2 tablets (800 mg) by mouth 2 times daily       mycophenolate 250 MG capsule    CELLCEPT - GENERIC EQUIVALENT    60 capsule    Take 1 capsule (250 mg) by mouth 2 times daily       sulfamethoxazole-trimethoprim 400-80 MG per tablet    BACTRIM/SEPTRA    30 tablet    Take 1 tablet by mouth daily       tacrolimus capsule     180 capsule    Take 3 " capsules (3 mg) by mouth 2 times daily       traMADol 50 MG tablet    ULTRAM    45 tablet    Take 1 tablet (50 mg) by mouth every 8 hours as needed for moderate pain       vitamin D 2000 UNITS tablet     100 tablet    Take 2,000 Units by mouth daily

## 2017-02-25 NOTE — PROGRESS NOTES
Endocrinology Note         Cricket is a 36 year old male presents today for abnormal PET activity in the thyroid gland    HPI  Cricket Chen is a 36 years old male with hx of post liver and kidney transplant in 5/2016, lymphoproliferative disorder, atrial flutter, hypertension who is here for evaluation of abnormal PET activity in the thyroid gland.    He was newly diagnosed with post-transplant lymphoproliferative disorder recently. He is status post liver and kidney transplant in 5/2016. He noticed swollen lymph node on the right neck and did have excisional biopsy on 1/26/2017 which was consistent with diffuse large B cell lymphoma. He did have whole body PET scan for baseline evaluation before starting him on chemotherapy and noted to have hypermetabolic focus in the right lobe of the thyroid with a max SUV of 12.7. Otherwise no suspicious FDG uptake within the head and neck. He also has PET-avid right axillary lymphadenopathy measuring 1.3 cm but no evidence of FDG-avid areas throughout the rest of his body. He has not had ultrasound thyroid yet.     He is currently getting infusion of rituximab weekly for PTLD. He just received the third infusion yesterday.   He is doing well. He has not had any compressive neck symptoms. He denied difficulty swallowing or breathing. He denied hx of radiation exposure to head and neck. He denied family history of thyroid cancer.     Past Medical History  Past Medical History   Diagnosis Date     Alcohol abuse      Last drink in Mid-April 2014     Anemia in ESRD (end-stage renal disease) (H)      Anxiety 2008     Atrial flutter (H)      Cirrhosis (H)      S/P liver transplant     Depression      History of blood transfusion      History of transposition of great vessels      atrial switch at age 8 months old     Hypertension      Liver transplant recipient (H)      2016     Pneumonia 11-15-14     Renal transplant recipient      2016     Varices, esophageal (H)         Allergies  Allergies   Allergen Reactions     Hydromorphone Itching     Medications  Current Outpatient Prescriptions   Medication Sig Dispense Refill     mycophenolate (CELLCEPT - GENERIC EQUIVALENT) 250 MG capsule Take 1 capsule (250 mg) by mouth 2 times daily 60 capsule 11     PROGRAF 1 MG PO CAPSULE Take 3 capsules (3 mg) by mouth 2 times daily 180 capsule 11     Cholecalciferol (VITAMIN D) 2000 UNITS tablet Take 2,000 Units by mouth daily 100 tablet 3     traMADol (ULTRAM) 50 MG tablet Take 1 tablet (50 mg) by mouth every 8 hours as needed for moderate pain 45 tablet 0     magnesium oxide (MAG-OX) 400 (241.3 MG) MG tablet Take 2 tablets (800 mg) by mouth 2 times daily 120 tablet 3     amLODIPine (NORVASC) 10 MG tablet Take 1 tablet (10 mg) by mouth daily 90 tablet 3     lisinopril (PRINIVIL,ZESTRIL) 5 MG tablet Take 1 tablet (5 mg) by mouth daily 90 tablet 3     sulfamethoxazole-trimethoprim (BACTRIM,SEPTRA) 400-80 MG per tablet Take 1 tablet by mouth daily 30 tablet 11     Family History  family history includes Arthritis in his father; Hypertension in his brother, brother, father, mother, sister, and sister. There is no history of Alcohol/Drug or GASTROINTESTINAL DISEASE.   No family hx of thyroid cancer    Social History  Social History   Substance Use Topics     Smoking status: Former Smoker     Packs/day: 0.33     Years: 3.00     Types: Cigarettes     Start date: 8/20/1997     Quit date: 9/21/2000     Smokeless tobacco: Former User     Alcohol use No      Comment: ~10 drinks per day for ten years, quit in April of 2014   quit drinking  Non , no children    ROS  Constitutional: no weight change, good energy  Eyes: no vision change, diplopia or red eyes   Neck: no difficulty swallowing, no choking, no neck pain, no neck swelling  Cardiovascular: no chest pain, palpitations  Respiratory: no dyspnea, cough, shortness of breath or wheezing   GI: no nausea, vomiting, diarrhea or constipation, no  "abdominal pain   : no change in urine, no dysuria or hematuria  Musculoskeletal: no joint or muscle pain or swelling   Integumentary: no concerning lesions  Neuro: no loss of strength or sensation, no numbness or tingling, no tremor, no dizziness, no headache   Endo: no polyuria or polydipsia, no temperature intolerance   Heme/Lymph: no concerning bumps, no bleeding problems   Allergy: no environmental allergies   Psych: no depression or anxiety     Physical Exam  /83  Pulse 82  Ht 1.88 m (6' 2\")  Wt 90.8 kg (200 lb 1.6 oz)  BMI 25.69 kg/m2  Body mass index is 25.69 kg/(m^2).  Constitutional: no distress, comfortable, pleasant   Eyes: anicteric, normal extra-ocular movements, no lid lag or retraction  Neck: surgical scar at the upper part of the right neck, no thyromegaly, no discrete nodule  Cardiovascular: regular rate and rhythm, normal S1 and S2, no murmurs  Respiratory: clear to auscultation, no wheezes or crackles, normal breath sounds   Gastrointestinal:  Surgical scar along right and left costal area, nontender, no hepatomegaly, no masses   Musculoskeletal: no edema   Skin: no jaundice   Neurological: cranial nerves intact, 2+ reflexes at patella , normal gait, no tremor on outstretched hands bilaterally  Psychological: appropriate mood   Lymphatic: no cervical  lymphadenopathy.    RESULTS  PET 2/4/2017  HEAD/NECK:  Hypermetabolic focus in the right lobe of the thyroid with a max SUV of 12.7. Otherwise no suspicious FDG uptake within the head and neck. There is a 3.0 x 2.2 cm postoperative collection in the right lateral  neck anterior to the sternocleidal mastoid muscle, lateral to the right submandibular gland.   No abnormality identified within the mucosal spaces of the neck. Tongue base is normal. No lymphadenopathy within the neck. Limited head CT is within normal limits.    IMPRESSION:   1. Hypermetabolic right axillary lymph node, suspicious for involvement by lymphoproliferative disease.  2. " Hypermetabolic lesion in the right thyroid with a max SUV of 12.7. No definite CT correlate identified. Ultrasound of the thyroid is recommended.  3. Indeterminant 2.2 cm area of decreased enhancement within the right lower quadrant transplant kidney. It does not appear to be  masslike. Ultrasound is recommended for further characterization   4. Postsurgical changes of liver and right lower quadrant kidney transplantation.  5. Transposition of great vessels with postsurgical changes of atrial baffle procedure resulting in a large, presumably intentional atrial septal defect.     ENDO THYROID LABS-[facility] Latest Ref Rng & Units 7/14/2016 10/14/2014   TSH 0.40 - 4.00 mU/L 5.16 (H) 3.95   T4 FREE 0.76 - 1.46 ng/dL 0.95      ASSESSMENT:    Cricket Chen is a 36 years old male with hx of post liver and kidney transplant in 5/2016, lymphoproliferative disorder, atrial flutter, hypertension who is here for evaluation of abnormal PET scan in the thyroid gland    1) Hypermetabolic activity at right thyroid gland from PET scan: he noted to have hypermetabolic lesion with SUV max of 12.7 in the right thyroid gland. This could represented thyroid nodule, underlying thyroid disease such as Hashimoto's thyroiditis. Given that the lesion is focal, thyroid nodule is more likely. I did perform bedside ultrasound thyroid that showed ~1 cm hypoechoic solid nodule at the right thyroid and ~1 cm cystic nodule at the left thyroid. I will schedule him to get official ultrasound at radiology department. I will also get lab test today. FNA is considered based on official US thyroid result.     2) mild elevated TSH: lab in July 2016 showed TSH of 5.1 with normal FT4. This could represent sick euthyroid. Will repeat lab at this time.    PLAN:   - lab for TSH, FT4 and TPO  - schedule for US thyroid    Zuleyma Gray MD     Division of Diabetes and Endocrinology  Department of Medicine  [phone]

## 2017-03-01 ENCOUNTER — THERAPY VISIT (OUTPATIENT)
Dept: PHYSICAL THERAPY | Facility: CLINIC | Age: 37
End: 2017-03-01
Payer: COMMERCIAL

## 2017-03-01 DIAGNOSIS — M54.2 CERVICALGIA: ICD-10-CM

## 2017-03-01 DIAGNOSIS — M25.511 RIGHT SHOULDER PAIN, UNSPECIFIED CHRONICITY: ICD-10-CM

## 2017-03-01 PROCEDURE — 97112 NEUROMUSCULAR REEDUCATION: CPT | Mod: GP | Performed by: PHYSICAL THERAPIST

## 2017-03-01 PROCEDURE — 97110 THERAPEUTIC EXERCISES: CPT | Mod: GP | Performed by: PHYSICAL THERAPIST

## 2017-03-01 NOTE — MR AVS SNAPSHOT
After Visit Summary   3/1/2017    Cricket Chen    MRN: [id number]           Patient Information     Date Of Birth          1980        Visit Information        Provider Department      3/1/2017 4:00 PM Lanie Fernández PT [facility] Physical Therapy [facility]        Today's Diagnoses     Cervicalgia        Right shoulder pain, unspecified chronicity           Follow-ups after your visit        Your next 10 appointments already scheduled     Mar 02, 2017  3:00 PM CST   US THYROID with UCUS3   [facility] Imaging Center US ([facility])    [address] [place] [address]  [address] [facility] [id number] [phone]           Please bring a list of your medicines (including vitamins, minerals and over-the-counter drugs). Also, tell your doctor about any allergies you may have. Wear comfortable clothes and leave your valuables at home.  You do not need to do anything special to prepare for your exam.  Please call the Imaging Department at your exam site with any questions.            Mar 02, 2017  4:00 PM CST   [facility] Draw with  [facility] LAB DRAW   [facility] Lab Draw ([facility])    [address] [id number] [phone]            Mar 02, 2017  4:30 PM CST   RETURN ONC with Girish Narayanan MD   [facility] Blood and Marrow Transplant ([facility])    [address] [place] [id number] [phone]            Mar 03, 2017 10:00 AM CST   Infusion 360 with UC ONCOLOGY INFUSION, UC 17 ATC   [facility] Cancer Clinic ([facility])    [address] [place] [address]  [address] [facility] [id number]   [phone]            Apr 04, 2017 12:10 PM CDT   (Arrive by 11:55 AM)   Return General Liver with Laureen Galvan MD   [facility] Hepatology ([facility])    [address] [place] [id number]    [phone]            Apr 18, 2017 10:30 AM CDT   (Arrive by 10:00 AM)   Return Kidney Transplant with Jake Sidhu MD   [facility] Nephrology ([facility])    [address] [place] [id number] [phone]              Who to contact     If you have questions or need follow up information about today's clinic visit or your schedule please contact [facility] PHYSICAL THERAPY [facility] directly at [phone].  Normal or non-critical lab and imaging results will be communicated to you by Touch-Writerhart, letter or phone within 4 business days after the clinic has received the results. If you do not hear from us within 7 days, please contact the clinic through Storypandat or phone. If you have a critical or abnormal lab result, we will notify you by phone as soon as possible.  Submit refill requests through [employer] or call your pharmacy and they will forward the refill request to us. Please allow 3 business days for your refill to be completed.          Additional Information About Your Visit        Touch-WriterharRip van [employer]els Information     [employer] gives you secure access to your electronic health record. If you see a primary care provider, you can also send messages to your care team and make appointments. If you have questions, please call your primary care clinic.  If you do not have a primary care provider, please call [phone] and they will assist you.        Care EveryWhere ID     This is your Care EveryWhere ID. This could be used by other organizations to access your [facility] medical records  [id number]         Blood Pressure from Last 3 Encounters:   02/25/17 130/83   02/24/17 135/84   02/17/17 131/76    Weight from Last 3 Encounters:   02/25/17 90.8 kg (200 lb 1.6 oz)   02/24/17 91.1 kg (200 lb 13.4 oz)   02/17/17 89.9 kg (198 lb 1.6 oz)              We Performed the Following     NEUROMUSCULAR RE-EDUCATION     THERAPEUTIC EXERCISES        Primary Care Provider Office Phone #  Fax #    Eugenia Perez [phone]       [address] [address]        Thank you!     Thank you for choosing [facility] PHYSICAL THERAPY [facility]  for your care. Our goal is always to provide you with excellent care. Hearing back from our patients is one way we can continue to improve our services. Please take a few minutes to complete the written survey that you may receive in the mail after your visit with us. Thank you!             Your Updated Medication List - Protect others around you: Learn how to safely use, store and throw away your medicines at www.disposemymeds.org.          This list is accurate as of: 3/1/17 11:59 PM.  Always use your most recent med list.                   Brand Name Dispense Instructions for use    amLODIPine 10 MG tablet    NORVASC    90 tablet    Take 1 tablet (10 mg) by mouth daily       lisinopril 5 MG tablet    PRINIVIL/ZESTRIL    90 tablet    Take 1 tablet (5 mg) by mouth daily       magnesium oxide 400 (241.3 MG) MG tablet    MAG-OX    120 tablet    Take 2 tablets (800 mg) by mouth 2 times daily       mycophenolate 250 MG capsule    CELLCEPT - GENERIC EQUIVALENT    60 capsule    Take 1 capsule (250 mg) by mouth 2 times daily       sulfamethoxazole-trimethoprim 400-80 MG per tablet    BACTRIM/SEPTRA    30 tablet    Take 1 tablet by mouth daily       tacrolimus capsule     180 capsule    Take 3 capsules (3 mg) by mouth 2 times daily       traMADol 50 MG tablet    ULTRAM    45 tablet    Take 1 tablet (50 mg) by mouth every 8 hours as needed for moderate pain       vitamin D 2000 UNITS tablet     100 tablet    Take 2,000 Units by mouth daily

## 2017-03-02 ENCOUNTER — OFFICE VISIT (OUTPATIENT)
Dept: TRANSPLANT | Facility: CLINIC | Age: 37
End: 2017-03-02
Attending: INTERNAL MEDICINE
Payer: COMMERCIAL

## 2017-03-02 ENCOUNTER — APPOINTMENT (OUTPATIENT)
Dept: LAB | Facility: CLINIC | Age: 37
End: 2017-03-02
Attending: INTERNAL MEDICINE
Payer: COMMERCIAL

## 2017-03-02 VITALS
HEART RATE: 74 BPM | RESPIRATION RATE: 18 BRPM | WEIGHT: 201.2 LBS | DIASTOLIC BLOOD PRESSURE: 72 MMHG | TEMPERATURE: 98.6 F | BODY MASS INDEX: 25.83 KG/M2 | SYSTOLIC BLOOD PRESSURE: 127 MMHG | OXYGEN SATURATION: 99 %

## 2017-03-02 DIAGNOSIS — D47.Z1 PTLD (POST-TRANSPLANT LYMPHOPROLIFERATIVE DISORDER) (H): Primary | ICD-10-CM

## 2017-03-02 DIAGNOSIS — E04.1 THYROID NODULE: ICD-10-CM

## 2017-03-02 LAB
ALBUMIN SERPL-MCNC: 4.4 G/DL (ref 3.4–5)
ALP SERPL-CCNC: 96 U/L (ref 40–150)
ALT SERPL W P-5'-P-CCNC: 23 U/L (ref 0–70)
ANION GAP SERPL CALCULATED.3IONS-SCNC: 8 MMOL/L (ref 3–14)
AST SERPL W P-5'-P-CCNC: 15 U/L (ref 0–45)
BASOPHILS # BLD AUTO: 0 10E9/L (ref 0–0.2)
BASOPHILS NFR BLD AUTO: 0.2 %
BILIRUB SERPL-MCNC: 0.6 MG/DL (ref 0.2–1.3)
BUN SERPL-MCNC: 18 MG/DL (ref 7–30)
CALCIUM SERPL-MCNC: 9 MG/DL (ref 8.5–10.1)
CHLORIDE SERPL-SCNC: 106 MMOL/L (ref 94–109)
CO2 SERPL-SCNC: 26 MMOL/L (ref 20–32)
CREAT SERPL-MCNC: 1.02 MG/DL (ref 0.66–1.25)
DIFFERENTIAL METHOD BLD: ABNORMAL
EOSINOPHIL # BLD AUTO: 0.1 10E9/L (ref 0–0.7)
EOSINOPHIL NFR BLD AUTO: 1.2 %
ERYTHROCYTE [DISTWIDTH] IN BLOOD BY AUTOMATED COUNT: 13.5 % (ref 10–15)
GFR SERPL CREATININE-BSD FRML MDRD: 82 ML/MIN/1.7M2
GLUCOSE SERPL-MCNC: 90 MG/DL (ref 70–99)
HCT VFR BLD AUTO: 44 % (ref 40–53)
HGB BLD-MCNC: 14.8 G/DL (ref 13.3–17.7)
IMM GRANULOCYTES # BLD: 0 10E9/L (ref 0–0.4)
IMM GRANULOCYTES NFR BLD: 0.2 %
LYMPHOCYTES # BLD AUTO: 0.5 10E9/L (ref 0.8–5.3)
LYMPHOCYTES NFR BLD AUTO: 12.5 %
MCH RBC QN AUTO: 29.7 PG (ref 26.5–33)
MCHC RBC AUTO-ENTMCNC: 33.6 G/DL (ref 31.5–36.5)
MCV RBC AUTO: 88 FL (ref 78–100)
MONOCYTES # BLD AUTO: 0.4 10E9/L (ref 0–1.3)
MONOCYTES NFR BLD AUTO: 9.6 %
NEUTROPHILS # BLD AUTO: 3.2 10E9/L (ref 1.6–8.3)
NEUTROPHILS NFR BLD AUTO: 76.3 %
NRBC # BLD AUTO: 0 10*3/UL
NRBC BLD AUTO-RTO: 0 /100
PLATELET # BLD AUTO: 103 10E9/L (ref 150–450)
POTASSIUM SERPL-SCNC: 4.6 MMOL/L (ref 3.4–5.3)
PROT SERPL-MCNC: 7.6 G/DL (ref 6.8–8.8)
RBC # BLD AUTO: 4.98 10E12/L (ref 4.4–5.9)
SODIUM SERPL-SCNC: 140 MMOL/L (ref 133–144)
T4 FREE SERPL-MCNC: 0.92 NG/DL (ref 0.76–1.46)
TSH SERPL DL<=0.05 MIU/L-ACNC: 3.69 MU/L (ref 0.4–4)
WBC # BLD AUTO: 4.2 10E9/L (ref 4–11)

## 2017-03-02 PROCEDURE — 84439 ASSAY OF FREE THYROXINE: CPT | Performed by: INTERNAL MEDICINE

## 2017-03-02 PROCEDURE — 86376 MICROSOMAL ANTIBODY EACH: CPT | Performed by: INTERNAL MEDICINE

## 2017-03-02 PROCEDURE — 84443 ASSAY THYROID STIM HORMONE: CPT | Performed by: INTERNAL MEDICINE

## 2017-03-02 PROCEDURE — 36415 COLL VENOUS BLD VENIPUNCTURE: CPT

## 2017-03-02 PROCEDURE — 80053 COMPREHEN METABOLIC PANEL: CPT | Performed by: INTERNAL MEDICINE

## 2017-03-02 PROCEDURE — 85025 COMPLETE CBC W/AUTO DIFF WBC: CPT | Performed by: INTERNAL MEDICINE

## 2017-03-02 PROCEDURE — 99212 OFFICE O/P EST SF 10 MIN: CPT

## 2017-03-02 ASSESSMENT — PAIN SCALES - GENERAL: PAINLEVEL: NO PAIN (0)

## 2017-03-02 NOTE — PROGRESS NOTES
REASON FOR VISIT:  Follow up for PTLD.      HISTORY OF PRESENT ILLNESS/REVIEW OF SYSTEMS:  Mr. Chen is a very pleasant 36-year-old gentleman with a prior history of end-stage liver disease and kidney disease, status post combined liver/kidney transplant back in 05/2016.  He was maintained on immunosuppression with tacrolimus and CellCept, and was feeling well up until 12/2016 when he started to experience excessive fatigue and had noticed the emergence of a lump in his right neck.  He was subsequently evaluated in January for persistent right neck lymphadenopathy and underwent a fine needle aspiration, which was largely nondiagnostic.  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      The patient underwent reduction of immunosuppression with decrease in the dose of his tacrolimus from 4 mg in the morning and 3 mg in the evening to 3 mg b.i.d.  His CellCept was also decreased from 750 mg twice a day to 250 mg twice a day which he continues to take up to date.     His PET scan overall demonstrated relatively low burden of disease with no PET-active area in the neck, except from the thyroid gland.  The patient was also found to have PET-avid right axillary lymphadenopathy measuring 1.3 cm.     Started Rituximab weekly and completed 3 infusions out of 4.    Interval progress:   Reports overall improvement. No signif fatigue, weight loss or any new lumps. No recent infections, F/C/NS. Tolerated R well. He remains active with no major restrictions in his regular activities of daily living.     The rest of 12 points of ROS were reviewed and found to be negative, unless as mentioned above.  He also denies any new neurologic symptoms.      MEDICATIONS:    We reviewed his medications     PHYSICAL EXAMINATION:   VITAL SIGNS:   Reviewed in Epic.   GENERAL:  Not in acute distress, alert and oriented, well-nourished and hydrated.   HEENT:  Moist mucous membranes with no ulcerations.  Pupils are equally round and reactive to light.  No conjunctival erythema or jaundice.   NECK: No palpable mass in the neck with a well-healed scar.  No marked lymphadenopathy in the axilla or groin, no palpable splenomegaly.   CARDIOVASCULAR:  Regular rate and rhythm.  A 3/6 systolic ejection murmur at the right sternal border.   PULMONARY:  Clear to auscultation bilaterally.   ABDOMEN:  Soft, nontender and nondistended.  Audible bowel sounds.   EXTREMITIES: No ankle edema bilaterally.   NEUROLOGIC:  Grossly nonfocal with good gait and balance.      LABORATORY DATA:   Hematology Studies   Recent Labs   Lab Test  03/02/17   1605  02/24/17   1013  02/17/17   0755  02/09/17   1052   WBC  4.2  5.7  5.3  4.8   ANEU  3.2  4.5  4.2   --    ALYM  0.5*  0.7*  0.6*   --    JANE  0.4  0.5  0.4   --    AEOS  0.1  0.1  0.1   --    HGB  14.8  13.5  13.7  14.9   HCT  44.0  40.0  41.2  44.9   PLT  103*  81*  99*  105*   EBV: pend   In January ~ 3000 range.      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 Doing well overall with no signs of progression or LAD. Will proceed with R #4 and will schedule interim CT in 4 weeks.  He follows up with Endocrinology and had a dedicated ultrasound of the thyroid today. May need an FNA to r/o thyroid malignancy vs. PTLD. Will continue with risk stratified sequential therapy for his PTLD with either  "R-CHOP x 4 or continued R x 4, depending on the results of future CT and thyroid Bx. He will continue in the meantime with currently reduced immunosuppression regimen.      I spent over 50% of this 25 min encounter in counseling and care coordination, and discussing and arranging his treatment plan as outlined above.      Girish Narayanan MD   Hematology, Oncology and Transplantation   [facility]               Cricket Chen is a 36 year old male who presents for:  Chief Complaint   Patient presents with     Blood Draw     Pt with hx of PTLD labs collected via venipuncture.      Oncology Clinic Visit     Return: EBV        Initial Vitals:  /72  Pulse 74  Temp 98.6  F (37  C) (Oral)  Resp 18  Wt 91.3 kg (201 lb 3.2 oz)  SpO2 99%  BMI 25.83 kg/m2 Estimated body mass index is 25.83 kg/(m^2) as calculated from the following:    Height as of 2/25/17: 1.88 m (6' 2\").    Weight as of this encounter: 91.3 kg (201 lb 3.2 oz).. Body surface area is 2.18 meters squared. BP completed using cuff size: BP was taken in LAB INTAKE  No Pain (0) No LMP for male patient. Allergies and medications reviewed.     Medications: Medication refills not needed today.  Pharmacy name entered into [employer]berry:    [facility] - [place], [place] - [id number]  [facility] - [place], [place] - [address] [place] [address] ADARSH     Comments: Patient received flu vaccine. See Immunizations.  Afsaneh Soto CMA        6 minutes for nursing intake (face to face time)   Afsaneh Soto CMA          "

## 2017-03-02 NOTE — LETTER
Patient:  Cricket Chen  :   1980  MRN:     [id number]        Mr.Joseph Chen  [address] [facility] [address] [facility] [address]        March 3, 2017    Dear ,    We are writing to inform you of your test results.    Your thyroid function is normal.    Resulted Orders   TSH   Result Value Ref Range    TSH 3.69 0.40 - 4.00 mU/L   T4 free   Result Value Ref Range    T4 Free 0.92 0.76 - 1.46 ng/dL   Thyroid peroxidase antibody   Result Value Ref Range    Thyroid Peroxidase Antibody <10 <35 IU/mL       If you have any questions, please do not hesitate to call [phone] and ask for Endocrinology clinic.      After clinic hours, please contact [phone] and ask for Endocrinologist-on call    Sincerely,    Zuleyma Gray MD  Endocrinology    [id number]  1980

## 2017-03-02 NOTE — LETTER
3/2/2017       RE: Cricket Chen  [address] [address]     Dear Colleague,    Thank you for referring your patient, Cricket Chen, to the [facility] B[facility]OD [facility]. Please see a copy of my visit note below.    REASON FOR VISIT:  Follow up for PTLD.      HISTORY OF PRESENT ILLNESS/REVIEW OF SYSTEMS:  Mr. Chen is a very pleasant 36-year-old gentleman with a prior history of end-stage liver disease and kidney disease, status post combined liver/kidney transplant back in 05/2016.  He was maintained on immunosuppression with tacrolimus and CellCept, and was feeling well up until 12/2016 when he started to experience excessive fatigue and had noticed the emergence of a lump in his right neck.  He was subsequently evaluated in January for persistent right neck lymphadenopathy and underwent a fine needle aspiration, which was largely nondiagnostic.  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      The patient underwent reduction of immunosuppression with decrease in the dose of his tacrolimus from 4 mg in the morning and 3 mg in the evening to 3 mg b.i.d.  His CellCept was also decreased from 750 mg twice a day to 250 mg twice a day which he continues to take up to date.     His PET scan overall demonstrated relatively low burden of disease with no PET-active area in the neck, except from the thyroid gland.  The patient was also found to have PET-avid right axillary lymphadenopathy measuring 1.3 cm.     Started Rituximab weekly and completed 3 infusions out of 4.    Interval progress:   Reports overall improvement. No signif fatigue, weight loss or any new lumps. No recent infections, F/C/NS. Tolerated R well. He remains active with  no major restrictions in his regular activities of daily living.     The rest of 12 points of ROS were reviewed and found to be negative, unless as mentioned above.  He also denies any new neurologic symptoms.      MEDICATIONS:    We reviewed his medications     PHYSICAL EXAMINATION:   VITAL SIGNS:  Reviewed in Epic.   GENERAL:  Not in acute distress, alert and oriented, well-nourished and hydrated.   HEENT:  Moist mucous membranes with no ulcerations.  Pupils are equally round and reactive to light.  No conjunctival erythema or jaundice.   NECK: No palpable mass in the neck with a well-healed scar.  No marked lymphadenopathy in the axilla or groin, no palpable splenomegaly.   CARDIOVASCULAR:  Regular rate and rhythm.  A 3/6 systolic ejection murmur at the right sternal border.   PULMONARY:  Clear to auscultation bilaterally.   ABDOMEN:  Soft, nontender and nondistended.  Audible bowel sounds.   EXTREMITIES: No ankle edema bilaterally.   NEUROLOGIC:  Grossly nonfocal with good gait and balance.      LABORATORY DATA:   Hematology Studies   Recent Labs   Lab Test  03/02/17   1605  02/24/17   1013  02/17/17   0755  02/09/17   1052   WBC  4.2  5.7  5.3  4.8   ANEU  3.2  4.5  4.2   --    ALYM  0.5*  0.7*  0.6*   --    JANE  0.4  0.5  0.4   --    AEOS  0.1  0.1  0.1   --    HGB  14.8  13.5  13.7  14.9   HCT  44.0  40.0  41.2  44.9   PLT  103*  81*  99*  105*   EBV: pend   In January ~ 3000 range.      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 Doing well overall with no signs of  "progression or LAD. Will proceed with R #4 and will schedule interim CT in 4 weeks.  He follows up with Endocrinology and had a dedicated ultrasound of the thyroid today. May need an FNA to r/o thyroid malignancy vs. PTLD. Will continue with risk stratified sequential therapy for his PTLD with either R-CHOP x 4 or continued R x 4, depending on the results of future CT and thyroid Bx. He will continue in the meantime with currently reduced immunosuppression regimen.      I spent over 50% of this 25 min encounter in counseling and care coordination, and discussing and arranging his treatment plan as outlined above.      Girish Narayanan MD   Hematology, Oncology and Transplantation   [facility]               Cricket Chen is a 36 year old male who presents for:  Chief Complaint   Patient presents with     Blood Draw     Pt with hx of PTLD labs collected via venipuncture.      Oncology Clinic Visit     Return: EBV        Initial Vitals:  /72  Pulse 74  Temp 98.6  F (37  C) (Oral)  Resp 18  Wt 91.3 kg (201 lb 3.2 oz)  SpO2 99%  BMI 25.83 kg/m2 Estimated body mass index is 25.83 kg/(m^2) as calculated from the following:    Height as of 2/25/17: 1.88 m (6' 2\").    Weight as of this encounter: 91.3 kg (201 lb 3.2 oz).. Body surface area is 2.18 meters squared. BP completed using cuff size: BP was taken in LAB INTAKE  No Pain (0) No LMP for male patient. Allergies and medications reviewed.     Medications: Medication refills not needed today.  Pharmacy name entered into [email or web address]:    [facility] - [place], [place] - [id number]  [facility] - [place], [place] - [address] [place] [address] [facility]     Comments: Patient received flu vaccine. See Immunizations.  Afsaneh Soto CMA        6 minutes for nursing intake (face to face time)   Afsaneh Soto CMA        Again, thank you for allowing me to participate in the care of your patient.  "     Sincerely,    Girish Narayanan MD

## 2017-03-02 NOTE — NURSING NOTE
Chief Complaint   Patient presents with     Blood Draw     Pt with hx of PTLD labs collected via venipuncture.

## 2017-03-02 NOTE — MR AVS SNAPSHOT
After Visit Summary   3/2/2017    Cricket Chen    MRN: [id number]           Patient Information     Date Of Birth          1980        Visit Information        Provider Department      3/2/2017 4:30 PM Girish Narayanan MD [facility] Blood and Marrow Transplant        Today's Diagnoses     PTLD (post-transplant lymphoproliferative disorder) (H)    -  1    Thyroid nodule        Esophageal varices (H)              [facility] [place]  [address] [place] [address]  Phone: [phone]  Clinic Hours:   Monday-Friday:    8am to 4:30pm   Weekends and holidays:    8am to noon (in general)  If your fever is 100.5  or greater,   call the clinic.  After hours call the   hospital at [phone] or   [phone]. Ask for the BMT   fellow for pediatric or adult patients            Follow-ups after your visit        Your next 10 appointments already scheduled     Mar 03, 2017 10:00 AM CST   Infusion 360 with  ONCOLOGY INFUSION,  17 ATC   [facility] ([facility])    [address] [place] [id number] [phone]            Mar 08, 2017 12:00 PM CST   [facility] Draw with UC [facility] LAB DRAW   [facility] Lab Draw ([facility])    [address] [place] [id number] [phone]            Mar 28, 2017 12:20 PM CDT   (Arrive by 12:05 PM)   CT CHEST/ABDOMEN/PELVIS W CONTRAST with UCCT1   [facility] [place] [facility] ([facility])    [address] [place] [id number] [phone]           Please bring any scans or X-rays taken at other hospitals, if similar tests were done. Also bring a list of your medicines, including vitamins, minerals and over-the-counter drugs. It is safest to leave personal items at home.  Be sure to tell your doctor:   If you have any allergies.   If there s any chance you are  pregnant.   If you are breastfeeding.   If you have any special needs.  You may have contrast for this exam. To prepare:   Do not eat or drink for 2 hours before your exam. If you need to take medicine, you may take it with small sips of water. (We may ask you to take liquid medicine as well.)   The day before your exam, drink extra fluids at least six 8-ounce glasses (unless your doctor tells you to restrict your fluids).  Patients over 70 or patients with diabetes or kidney problems:   If you haven t had a blood test (creatinine test) within the last 30 days, go to your clinic or Diagnostic Imaging Department for this test.  If you have diabetes:   If your kidney function is normal, continue taking your metformin (Avandamet, Glucophage, Glucovance, Metaglip) on the day of your exam.   If your kidney function is abnormal, wait 48 hours before restarting this medicine.  You will have oral contrast for this exam:   You will drink the contrast at home. Get this from your clinic or Diagnostic Imaging Department. Please follow the directions given.  Please wear loose clothing, such as a sweat suit or jogging clothes. Avoid snaps, zippers and other metal. We may ask you to undress and put on a hospital gown.  If you have any questions, please call the Imaging Department where you will have your exam.            Mar 28, 2017 12:40 PM CDT   (Arrive by 12:25 PM)   CT SOFT TISSUE NECK W CONTRAST with UCCT1   [facility] ([facility])    [address] [address] [phone]           Please bring any scans or X-rays taken at other hospitals, if similar tests were done. Also bring a list of your medicines, including vitamins, minerals and over-the-counter drugs. It is safest to leave personal items at home.  Be sure to tell your doctor:   If you have any allergies.   If there s any chance you are pregnant.   If you are breastfeeding.   If you have any  special needs.  You will have contrast for this exam. To prepare:   Do not eat or drink for 2 hours before your exam. If you need to take medicine, you may take it with small sips of water. (We may ask you to take liquid medicine as well.)   The day before your exam, drink extra fluids at least six 8-ounce glasses (unless your doctor tells you to restrict your fluids).  Patients over 70 or patients with diabetes or kidney problems:   If you haven t had a blood test (creatinine test) within the last 30 days, go to your clinic or Diagnostic Imaging Department for this test.  If you have diabetes:   If your kidney function is normal, continue taking your metformin (Avandamet, Glucophage, Glucovance, Metaglip) on the day of your exam.   If your kidney function is abnormal, wait 48 hours before restarting this medicine.  Please wear loose clothing, such as a sweat suit or jogging clothes. Avoid snaps, zippers and other metal. We may ask you to undress and put on a hospital gown.  If you have any questions, please call the Imaging Department where you will have your exam.            Mar 30, 2017  1:00 PM CDT   [facility] Lab Draw with  [facility] LAB DRAW   [facility] Lab Draw ([facility])    [address] [place] [address]  2nd [facility] [id number] [phone]            Mar 30, 2017  1:30 PM CDT   RETURN ONC with Girish Narayanan MD   [facility] Blood and Marrow Transplant ([facility])    [address] [facility]  2nd [facility] [address]   [phone]            Apr 04, 2017 12:10 PM CDT   (Arrive by 11:55 AM)   Return General Liver with Laureen Galvan MD   [facility] Hepatology ([facility])    [address] [facility]  3rd Floor  [facility] [address]   [phone]            Apr 18, 2017 10:30 AM CDT   (Arrive by 10:00 AM)   Return Kidney Transplant with Jake Sidhu MD   [facility] Nephrology ([facility])    [address] [facility]  3rd [facility] [address] [phone]              Future tests that were ordered for you today     Open Future Orders        Priority Expected Expires Ordered    CT Chest/Abdomen/Pelvis w Contrast Routine 3/28/2017 3/30/2017 3/2/2017    CT Soft Tissue Neck w Contrast Routine 3/28/2017 3/30/2017 3/2/2017    EBV DNA PCR Quantitative Whole Blood Routine 3/9/2017 3/2/2018 3/2/2017            Who to contact     If you have questions or need follow up information about today's clinic visit or your schedule please contact [facility] BLOOD [facility] MARROW TRANSPLANT directly at [phone].  Normal or non-critical lab and imaging results will be communicated to you by [employer], letter or phone within 4 business days after the clinic has received the results. If you do not hear from us within 7 days, please contact the clinic through [employer] or phone. If you have a critical or abnormal lab result, we will notify you by phone as soon as possible.  Submit refill requests through [employer] or call your pharmacy and they will forward the refill request to us. Please allow 3 business days for your refill to be completed.          Additional Information About Your Visit        [employer] Information     [employer] gives you secure access to your electronic health record. If you see a primary care provider, you can also send messages to your care team and make appointments. If you have questions, please call your primary care clinic.  If you do not have a primary care provider, please call [phone] and they will assist you.        Care EveryWhere ID     This is your Care EveryWhere ID. This could be used by other organizations to access your [facility] medical records  [id number]        Your Vitals Were     Pulse Temperature Respirations Pulse Oximetry BMI (Body Mass Index)       74 98.6  F (37  C) (Oral) 18 99% 25.83 kg/m2        Blood Pressure from Last 3 Encounters:    03/02/17 127/72   02/25/17 130/83   02/24/17 135/84    Weight from Last 3 Encounters:   03/02/17 91.3 kg (201 lb 3.2 oz)   02/25/17 90.8 kg (200 lb 1.6 oz)   02/24/17 91.1 kg (200 lb 13.4 oz)              We Performed the Following     *CBC with platelets differential     Comprehensive metabolic panel     T4 free     Thyroid peroxidase antibody     TSH        Recent Review Flowsheet Data     BMT Recent Results Latest Ref Rng & Units 2/1/2017 2/4/2017 2/7/2017 2/9/2017 2/17/2017 2/24/2017 3/2/2017    WBC 4.0 - 11.0 10e9/L 3.5(L) - 5.5 4.8 5.3 5.7 4.2    Hemoglobin 13.3 - 17.7 g/dL 14.1 - 15.5 14.9 13.7 13.5 14.8    Platelet Count 150 - 450 10e9/L 108(L) - 120(L) 105(L) 99(L) 81(L) 103(L)    Neutrophils (Absolute) 1.6 - 8.3 10e9/L 2.5 - 4.2 - 4.2 4.5 3.2    INR 0.86 - 1.14 0.99 - - - - - -    Sodium 133 - 144 mmol/L 142 - - 141 141 140 140    Potassium 3.4 - 5.3 mmol/L 4.7 - - 4.4 4.0 4.0 4.6    Chloride 94 - 109 mmol/L 107 - - 108 108 108 106    Glucose 70 - 99 mg/dL 84 101(H) - 106(H) 99 89 90    Urea Nitrogen 7 - 30 mg/dL 14 - - 15 18 18 18    Creatinine 0.66 - 1.25 mg/dL 1.19 - - 1.23 1.08 1.00 1.02    Calcium (Total) 8.5 - 10.1 mg/dL 9.0 - - 9.2 8.8 8.5 9.0    Protein (Total) 6.8 - 8.8 g/dL 7.7 - - 7.8 6.9 7.1 7.6    Albumin 3.4 - 5.0 g/dL 4.3 - - 4.5 4.0 4.0 4.4    Bilirubin (Direct) 0.0 - 0.2 mg/dL - - - 0.1 0.2 0.2 -    Alkaline Phosphatase 40 - 150 U/L 150 - - 122 94 86 96    AST 0 - 45 U/L 14 - - 12 14 13 15    ALT 0 - 70 U/L 69 - - 26 21 19 23    MCV 78 - 100 fl 90 - 89 90 88 87 88               Primary Care Provider Office Phone # Fax #    Eugenia Perez [phone]       [address] [address]        Thank you!     Thank you for choosing [facility] BLOOD [facility] MARROW TRANSPLANT  for your care. Our goal is always to provide you with excellent care. Hearing back from our patients is one way we can continue to improve our services. Please take a  few minutes to complete the written survey that you may receive in the mail after your visit with us. Thank you!             Your Updated Medication List - Protect others around you: Learn how to safely use, store and throw away your medicines at www.disposemymeds.org.          This list is accurate as of: 3/2/17  4:57 PM.  Always use your most recent med list.                   Brand Name Dispense Instructions for use    amLODIPine 10 MG tablet    NORVASC    90 tablet    Take 1 tablet (10 mg) by mouth daily       lisinopril 5 MG tablet    PRINIVIL/ZESTRIL    90 tablet    Take 1 tablet (5 mg) by mouth daily       magnesium oxide 400 (241.3 MG) MG tablet    MAG-OX    120 tablet    Take 2 tablets (800 mg) by mouth 2 times daily       mycophenolate 250 MG capsule    CELLCEPT - GENERIC EQUIVALENT    60 capsule    Take 1 capsule (250 mg) by mouth 2 times daily       sulfamethoxazole-trimethoprim 400-80 MG per tablet    BACTRIM/SEPTRA    30 tablet    Take 1 tablet by mouth daily       tacrolimus capsule     180 capsule    Take 3 capsules (3 mg) by mouth 2 times daily       traMADol 50 MG tablet    ULTRAM    45 tablet    Take 1 tablet (50 mg) by mouth every 8 hours as needed for moderate pain       vitamin D 2000 UNITS tablet     100 tablet    Take 2,000 Units by mouth daily

## 2017-03-03 ENCOUNTER — INFUSION THERAPY VISIT (OUTPATIENT)
Dept: ONCOLOGY | Facility: CLINIC | Age: 37
End: 2017-03-03
Attending: INTERNAL MEDICINE
Payer: COMMERCIAL

## 2017-03-03 ENCOUNTER — TELEPHONE (OUTPATIENT)
Dept: ENDOCRINOLOGY | Facility: CLINIC | Age: 37
End: 2017-03-03

## 2017-03-03 VITALS
OXYGEN SATURATION: 100 % | DIASTOLIC BLOOD PRESSURE: 72 MMHG | TEMPERATURE: 96.9 F | SYSTOLIC BLOOD PRESSURE: 128 MMHG | HEART RATE: 70 BPM

## 2017-03-03 DIAGNOSIS — D47.Z1 POST-TRANSPLANT LYMPHOPROLIFERATIVE DISORDER (H): ICD-10-CM

## 2017-03-03 DIAGNOSIS — I85.00 ESOPHAGEAL VARICES (H): Primary | ICD-10-CM

## 2017-03-03 LAB — THYROPEROXIDASE AB SERPL-ACNC: <10 IU/ML

## 2017-03-03 PROCEDURE — 40000141 ZZH STATISTIC PERIPHERAL IV START W/O US GUIDANCE: Mod: ZF

## 2017-03-03 PROCEDURE — 96413 CHEMO IV INFUSION 1 HR: CPT

## 2017-03-03 PROCEDURE — 25000132 ZZH RX MED GY IP 250 OP 250 PS 637: Mod: ZF | Performed by: INTERNAL MEDICINE

## 2017-03-03 PROCEDURE — 96415 CHEMO IV INFUSION ADDL HR: CPT

## 2017-03-03 PROCEDURE — 25000128 H RX IP 250 OP 636: Mod: ZF | Performed by: INTERNAL MEDICINE

## 2017-03-03 PROCEDURE — 25000125 ZZHC RX 250: Mod: ZF | Performed by: RADIOLOGY

## 2017-03-03 RX ORDER — DIPHENHYDRAMINE HCL 25 MG
50 CAPSULE ORAL ONCE
Status: COMPLETED | OUTPATIENT
Start: 2017-03-03 | End: 2017-03-03

## 2017-03-03 RX ORDER — ACETAMINOPHEN 325 MG/1
650 TABLET ORAL ONCE
Status: COMPLETED | OUTPATIENT
Start: 2017-03-03 | End: 2017-03-03

## 2017-03-03 RX ORDER — EPINEPHRINE 0.3 MG/.3ML
INJECTION SUBCUTANEOUS
Status: DISCONTINUED
Start: 2017-03-03 | End: 2017-03-03 | Stop reason: WASHOUT

## 2017-03-03 RX ADMIN — ACETAMINOPHEN 650 MG: 325 TABLET ORAL at 10:10

## 2017-03-03 RX ADMIN — RITUXIMAB 800 MG: 10 INJECTION, SOLUTION INTRAVENOUS at 10:33

## 2017-03-03 RX ADMIN — LIDOCAINE HYDROCHLORIDE 0.5 ML: 10 INJECTION, SOLUTION INFILTRATION; PERINEURAL at 11:44

## 2017-03-03 RX ADMIN — DIPHENHYDRAMINE HYDROCHLORIDE 50 MG: 25 CAPSULE ORAL at 10:10

## 2017-03-03 ASSESSMENT — PAIN SCALES - GENERAL: PAINLEVEL: NO PAIN (0)

## 2017-03-03 NOTE — PATIENT INSTRUCTIONS
Contact Numbers  [facility] Nurse Triage: [phone]  After Hours Nurse Line:  [phone]  Hospital Main Line:  [phone]    Please call the [facility] Triage line if you experience a temperature greater than or equal to 100.5, shaking chills, have uncontrolled nausea, vomiting and/or diarrhea, dizziness, shortness of breath, chest pain, bleeding, unexplained bruising, or if you have any other new/concerning symptoms, questions or concerns.     If it is after hours, weekends, or holidays, please call either the after hours nurse line listed above or the main hospital  at  [phone] and ask to speak to the Oncology doctor on call.     If you are having any concerning symptoms or wish to speak to a provider before your next infusion visit, please call your care coordinator or triage to notify them so we can adequately serve you.     If you need a refill on a narcotic prescription or other medication, please call triage before your infusion appointment.         March 2017 Sunday Monday Tuesday Wednesday Thursday Friday Saturday                  1     SPINE FOLLOW UP    4:00 PM   (40 min.)   Lanie Fernández, PT   [facility] PRISCILLA 2     US THYROID    3:00 PM   (60 min.)   UCUS3   [facility] US     P [facility] LAB DRAW    4:00 PM   (15 min.)   [facility] DRAW   [facility] Lab Draw     [facility] ONC RETURN    4:30 PM   (30 min.)   Girish Narayanan MD   [facility] Blood and Marrow Transplant 3     [facility] ONC INFUSION 360   10:00 AM   (360 min.)    ONCOLOGY INFUSION   [facility] 4       5     6     7     8     [facility] LAB DRAW   12:00 PM   (15 min.)    [facility] LAB DRAW   [facility] Lab Draw 9     10     11       12     13     14     15     16     17     18       19     20     21     22     23     24     25       26     27     28     CT CHEST/ABDOMEN/PELVIS W   12:05 PM   (20 min.)   CT1   [facility] CT     CT SOFT TISSUE  NECK W   12:25 PM   (20 min.)   UCCT1   [facility] CT 29     30     [facility] LAB DRAW    1:00 PM   (15 min.)    [facility] LAB DRAW   [facility] Lab Draw     [facility] ONC RETURN    1:30 PM   (30 min.)   Girish Narayanan MD   [facility] Blood and Marrow Transplant 31 April 2017 Sunday Monday Tuesday Wednesday Thursday Friday Saturday                                 1       2     3     4     [facility] RETURN GENERAL LIVER   11:55 AM   (20 min.)   Laureen Galvan MD   [facility] Hepatology 5     6     7     8       9     10     11     12     13     14     15       16     17     18     [facility] RETURN KIDNEY TRANSPLANT   10:00 AM   (25 min.)   Jake Sidhu MD   [facility] Nephrology 19     20     21     22       23     24     25     26     27     28     29       30                                                Lab Results:  No results found for this or any previous visit (from the past 12 hour(s)).

## 2017-03-03 NOTE — MR AVS SNAPSHOT
After Visit Summary   3/3/2017    Cricket Chen    MRN: [id number]           Patient Information     Date Of Birth          1980        Visit Information        Provider Department      3/3/2017 10:00 AM  17 ATC;  ONCOLOGY INFUSION [facility]        Today's Diagnoses     Esophageal varices (H)    -  1    Post-transplant lymphoproliferative disorder (H)          Care Instructions    Contact Numbers  [facility] Nurse Triage: [phone]  After Hours Nurse Line:  [phone]  Hospital Main Line:  [phone]    Please call the [facility] Triage line if you experience a temperature greater than or equal to 100.5, shaking chills, have uncontrolled nausea, vomiting and/or diarrhea, dizziness, shortness of breath, chest pain, bleeding, unexplained bruising, or if you have any other new/concerning symptoms, questions or concerns.     If it is after hours, weekends, or holidays, please call either the after hours nurse line listed above or the main hospital  at  [phone] and ask to speak to the Oncology doctor on call.     If you are having any concerning symptoms or wish to speak to a provider before your next infusion visit, please call your care coordinator or triage to notify them so we can adequately serve you.     If you need a refill on a narcotic prescription or other medication, please call triage before your infusion appointment.         March 2017 Sunday Monday Tuesday Wednesday Thursday Friday Saturday                  1     SPINE FOLLOW UP    4:00 PM   (40 min.)   Lanie Fernández PT   [facility] PRISCILLA 2     US THYROID    3:00 PM   (60 min.)   UCUS3   [facility]    4:00 PM   (15 min.)    [facility] DRAW   [facility] Lab Draw     [facility] ONC RETURN    4:30 PM   (30 min.)   Girish Narayanan MD   [facility] Blood and Marrow Transplant 3     [facility] ONC INFUSION 360   10:00 AM   (360 min.)    [facility] ONCOL[facility]Y INFUSION   [facility] 4       5     6     7     8     [facility]   12:00 PM   (15 min.)    [facility] DRAW   [facility] Draw 9     10     11       12     13     14     15     16     17     18       19     20     21     22     23     24     25       26     27     28     CT CHEST/ABDOMEN/PELVIS W   12:05 PM   (20 min.)   [address] [place] [address] CT     CT SOFT TISSUE NECK W   12:25 PM   (20 min.)   [address] [place] [address] CT 29     30     [facility]    1:00 PM   (15 min.)    [facility] DRAW   [facility] Draw     [facility] ONC RETURN    1:30 PM   (30 min.)   Girish Narayanan MD   [facility] Blood and Marrow Transplant 31 April 2017 Sunday Monday Tuesday Wednesday Thursday Friday Saturday                                 1       2     3     4     [facility] RETURN GENERAL LIVER   11:55 AM   (20 min.)   Laureen Galvan MD   [facility] Hepatology 5     6     7     8       9     10     11     12     13     14     15       16     17     18     [facility] RETURN KIDNEY TRANSPLANT   10:00 AM   (25 min.)   Jake Sidhu MD   [facility] Nephrology 19     20     21     22       23     24     25     26     27     28     29       30                                                Lab Results:  No results found for this or any previous visit (from the past 12 hour(s)).          Follow-ups after your visit        Your next 10 appointments already scheduled     Mar 08, 2017 12:00 PM [facility]   [facility] Draw with  [facility] DRAW   [facility] Draw ([facility])    [address]  2nd Floor  [facility] [id number] [phone]            Mar 28, 2017 12:20 PM CDT   (Arrive by 12:05 PM)   CT CHEST/ABDOMEN/PELVIS W CONTRAST with [address] ([facility])    [address]  1st Floor  [facility] [id number] [phone]            Please bring any scans or X-rays taken at other hospitals, if similar tests were done. Also bring a list of your medicines, including vitamins, minerals and over-the-counter drugs. It is safest to leave personal items at home.  Be sure to tell your doctor:   If you have any allergies.   If there s any chance you are pregnant.   If you are breastfeeding.   If you have any special needs.  You may have contrast for this exam. To prepare:   Do not eat or drink for 2 hours before your exam. If you need to take medicine, you may take it with small sips of water. (We may ask you to take liquid medicine as well.)   The day before your exam, drink extra fluids at least six 8-ounce glasses (unless your doctor tells you to restrict your fluids).  Patients over 70 or patients with diabetes or kidney problems:   If you haven t had a blood test (creatinine test) within the last 30 days, go to your clinic or Diagnostic Imaging Department for this test.  If you have diabetes:   If your kidney function is normal, continue taking your metformin (Avandamet, Glucophage, Glucovance, Metaglip) on the day of your exam.   If your kidney function is abnormal, wait 48 hours before restarting this medicine.  You will have oral contrast for this exam:   You will drink the contrast at home. Get this from your clinic or Diagnostic Imaging Department. Please follow the directions given.  Please wear loose clothing, such as a sweat suit or jogging clothes. Avoid snaps, zippers and other metal. We may ask you to undress and put on a hospital gown.  If you have any questions, please call the Imaging Department where you will have your exam.            Mar 28, 2017 12:40 PM CDT   (Arrive by 12:25 PM)   CT SOFT TISSUE NECK W CONTRAST with UC[facility]1   [facility] CT ([facility])    [address]  1st Floor  [place] [address] [phone]           Please bring any scans or X-rays taken at other  [facility], if similar tests were done. Also bring a list of your medicines, including vitamins, minerals and over-the-counter drugs. It is safest to leave personal items at home.  Be sure to tell your doctor:   If you have any allergies.   If there s any chance you are pregnant.   If you are breastfeeding.   If you have any special needs.  You will have contrast for this exam. To prepare:   Do not eat or drink for 2 hours before your exam. If you need to take medicine, you may take it with small sips of water. (We may ask you to take liquid medicine as well.)   The day before your exam, drink extra fluids at least six 8-ounce glasses (unless your doctor tells you to restrict your fluids).  Patients over 70 or patients with diabetes or kidney problems:   If you haven t had a blood test (creatinine test) within the last 30 days, go to your clinic or Diagnostic Imaging Department for this test.  If you have diabetes:   If your kidney function is normal, continue taking your metformin (Avandamet, Glucophage, Glucovance, Metaglip) on the day of your exam.   If your kidney function is abnormal, wait 48 hours before restarting this medicine.  Please wear loose clothing, such as a sweat suit or jogging clothes. Avoid snaps, zippers and other metal. We may ask you to undress and put on a hospital gown.  If you have any questions, please call the Imaging Department where you will have your exam.            Mar 30, 2017  1:00 PM CDT   [facility] Draw with  [facility] DRAW   [facility] Draw ([facility])    [address] [place] [id number]   [phone]            Mar 30, 2017  1:30 PM CDT   RETURN ONC with Girish Narayanan MD   [facility] Blood and Marrow Transplant ([facility])    [address] [place] [address]   [phone]            Apr 04, 2017 12:10 PM CDT   (Arrive by 11:55 AM)   Return General Liver  with Laureen Galvan MD   [facility] Hepatology ([facility])    [address] [id number] [phone]            Apr 18, 2017 10:30 AM CDT   (Arrive by 10:00 AM)   Return Kidney Transplant with Jake Sidhu MD   [facility] Nephrology ([facility])    [address] [id number] [phone]              Future tests that were ordered for you today     Open Future Orders        Priority Expected Expires Ordered    CT Chest/Abdomen/Pelvis w Contrast Routine 3/28/2017 3/30/2017 3/2/2017    CT Soft Tissue Neck w Contrast Routine 3/28/2017 3/30/2017 3/2/2017    EBV DNA PCR Quantitative Whole Blood Routine 3/9/2017 3/2/2018 3/2/2017            Who to contact     If you have questions or need follow up information about today's clinic visit or your schedule please contact [facility] directly at [phone].  Normal or non-critical lab and imaging results will be communicated to you by Qravedhart, letter or phone within 4 business days after the clinic has received the results. If you do not hear from us within 7 days, please contact the clinic through Kanchufangt or phone. If you have a critical or abnormal lab result, we will notify you by phone as soon as possible.  Submit refill requests through el? or call your pharmacy and they will forward the refill request to us. Please allow 3 business days for your refill to be completed.          Additional Information About Your Visit        Qravedhart Information     el? gives you secure access to your electronic health record. If you see a primary care provider, you can also send messages to your care team and make appointments. If you have questions, please call your primary care clinic.  If you do not have a primary care provider, please call [phone] and they will assist you.        Care EveryWhere ID     This is your  Care EveryWhere ID. This could be used by other organizations to access your [place] medical records  [id number]        Your Vitals Were     Pulse Temperature Pulse Oximetry             70 96.9  F (36.1  C) (Oral) 100%          Blood Pressure from Last 3 Encounters:   03/03/17 128/72   03/02/17 127/72   02/25/17 130/83    Weight from Last 3 Encounters:   03/02/17 91.3 kg (201 lb 3.2 oz)   02/25/17 90.8 kg (200 lb 1.6 oz)   02/24/17 91.1 kg (200 lb 13.4 oz)               Primary Care Provider Office Phone # Fax #    [place] Katerina Perez [phone]       [address] [address]        Thank you!     Thank you for choosing [facility]  for your care. Our goal is always to provide you with excellent care. Hearing back from our patients is one way we can continue to improve our services. Please take a few minutes to complete the written survey that you may receive in the mail after your visit with us. Thank you!             Your Updated Medication List - Protect others around you: Learn how to safely use, store and throw away your medicines at www.disposemymeds.org.          This list is accurate as of: 3/3/17  1:23 PM.  Always use your most recent med list.                   Brand Name Dispense Instructions for use    amLODIPine 10 MG tablet    NORVASC    90 tablet    Take 1 tablet (10 mg) by mouth daily       lisinopril 5 MG tablet    PRINIVIL/ZESTRIL    90 tablet    Take 1 tablet (5 mg) by mouth daily       magnesium oxide 400 (241.3 MG) MG tablet    MAG-OX    120 tablet    Take 2 tablets (800 mg) by mouth 2 times daily       mycophenolate 250 MG capsule    CELLCEPT - GENERIC EQUIVALENT    60 capsule    Take 1 capsule (250 mg) by mouth 2 times daily       sulfamethoxazole-trimethoprim 400-80 MG per tablet    BACTRIM/SEPTRA    30 tablet    Take 1 tablet by mouth daily       tacrolimus capsule     180 capsule    Take 3 capsules (3 mg) by  mouth 2 times daily       traMADol 50 MG tablet    ULTRAM    45 tablet    Take 1 tablet (50 mg) by mouth every 8 hours as needed for moderate pain       vitamin D 2000 UNITS tablet     100 tablet    Take 2,000 Units by mouth daily

## 2017-03-03 NOTE — TELEPHONE ENCOUNTER
Left message for patient to call back re: US thyroid result.    Zuleyma Gray MD    Division of Diabetes and Endocrinology  Department of Medicine  [phone]

## 2017-03-03 NOTE — PROGRESS NOTES
Infusion Nursing Note:  Cricket Chen presents today for D22 C1 Rituxan.    Patient seen by provider today: No  Was seen in clinic by Dr. Gar on 3/2/17    Intravenous Access:  Peripheral IV placed by vascular access with ultrasound.    Treatment Conditions:  Lab Results   Component Value Date    HGB 14.8 03/02/2017     Lab Results   Component Value Date    WBC 4.2 03/02/2017      Lab Results   Component Value Date    ANEU 3.2 03/02/2017     Lab Results   Component Value Date     03/02/2017      Lab Results   Component Value Date     03/02/2017                   Lab Results   Component Value Date    POTASSIUM 4.6 03/02/2017           Lab Results   Component Value Date    MAG 2.0 02/24/2017            Lab Results   Component Value Date    CR 1.02 03/02/2017                   Lab Results   Component Value Date    COLBY 9.0 03/02/2017                Lab Results   Component Value Date    BILITOTAL 0.6 03/02/2017           Lab Results   Component Value Date    ALBUMIN 4.4 03/02/2017                    Lab Results   Component Value Date    ALT 23 03/02/2017           Lab Results   Component Value Date    AST 15 03/02/2017     Results reviewed, labs MET treatment parameters, ok to proceed with treatment.    Note: Patient reports no new issues or concerns since his appointment with Dr. Gar on 3/2/17.    Rituxan infused at the following rates:  100ml/hour X30 minutes  200ml/hour X30 minutes  300ml/hour X30 minutes  400ml/hour for the remainder    Post Infusion Assessment:  Patient tolerated infusion without incident.  Blood return noted pre and post infusion.  Site patent and intact, free from redness, edema or discomfort.  No evidence of extravasations.  Access discontinued per protocol.    Discharge Plan:   Patient declined prescription refills.  Discharge instructions reviewed with: Patient.  Patient and/or family verbalized understanding of discharge instructions and all questions answered.  AVS to  patient via TransEnterixT.  Patient will return 3/18/17 for next appointment.   Patient discharged in stable condition accompanied by: self.  Departure Mode: Ambulatory.    Melissa Quispe RN

## 2017-03-06 ENCOUNTER — TELEPHONE (OUTPATIENT)
Dept: ENDOCRINOLOGY | Facility: CLINIC | Age: 37
End: 2017-03-06

## 2017-03-06 DIAGNOSIS — E04.1 THYROID NODULE: Primary | ICD-10-CM

## 2017-03-06 DIAGNOSIS — G89.29 CHRONIC PAIN OF LEFT KNEE: ICD-10-CM

## 2017-03-06 DIAGNOSIS — M25.562 CHRONIC PAIN OF LEFT KNEE: ICD-10-CM

## 2017-03-06 NOTE — TELEPHONE ENCOUNTER
-pt contacted, plan reviewed, pt will proceed with thyroid biopsy. Dr. Amor notified and pt will be contacted to schedule    ---- Message from Karla Armstrong sent at 3/6/2017 11:47 AM CST -----  Regarding: Test Results and Follow-Up Testing   Contact: [phone]  PT states me missed a call Friday about his test rults and details about a follow-up test.    PT can be reached at [phone]    Thank you!  Karla Aquino [place]

## 2017-03-06 NOTE — TELEPHONE ENCOUNTER
Tramadol      Last Written Prescription Date:  12/14/2016  Last Fill Quantity: 45,   # refills: 0  Last Office Visit with [facility] prescribing provider: n/a  Future Office visit:       Routing refill request to provider for review/approval because:  Drug not on the [facility] refill protocol or controlled substance

## 2017-03-08 DIAGNOSIS — D47.Z1 PTLD (POST-TRANSPLANT LYMPHOPROLIFERATIVE DISORDER) (H): ICD-10-CM

## 2017-03-08 PROCEDURE — 87799 DETECT AGENT NOS DNA QUANT: CPT | Performed by: INTERNAL MEDICINE

## 2017-03-08 RX ORDER — TRAMADOL HYDROCHLORIDE 50 MG/1
50 TABLET ORAL EVERY 8 HOURS PRN
Qty: 45 TABLET | Refills: 0 | Status: ON HOLD
Start: 2017-03-08 | End: 2017-08-07

## 2017-03-09 LAB
EBV DNA # SPEC NAA+PROBE: NORMAL {COPIES}/ML
EBV DNA SPEC NAA+PROBE-LOG#: NORMAL {LOG_COPIES}/ML

## 2017-03-13 DIAGNOSIS — Z94.4 LIVER REPLACED BY TRANSPLANT (H): ICD-10-CM

## 2017-03-13 LAB
ALBUMIN SERPL-MCNC: 4.1 G/DL (ref 3.4–5)
ALP SERPL-CCNC: 94 U/L (ref 40–150)
ALT SERPL W P-5'-P-CCNC: 23 U/L (ref 0–70)
ANION GAP SERPL CALCULATED.3IONS-SCNC: 7 MMOL/L (ref 3–14)
AST SERPL W P-5'-P-CCNC: 15 U/L (ref 0–45)
BILIRUB DIRECT SERPL-MCNC: 0.1 MG/DL (ref 0–0.2)
BILIRUB SERPL-MCNC: 0.5 MG/DL (ref 0.2–1.3)
BUN SERPL-MCNC: 21 MG/DL (ref 7–30)
CALCIUM SERPL-MCNC: 9.2 MG/DL (ref 8.5–10.1)
CHLORIDE SERPL-SCNC: 105 MMOL/L (ref 94–109)
CO2 SERPL-SCNC: 26 MMOL/L (ref 20–32)
CREAT SERPL-MCNC: 1.23 MG/DL (ref 0.66–1.25)
ERYTHROCYTE [DISTWIDTH] IN BLOOD BY AUTOMATED COUNT: 13.9 % (ref 10–15)
GFR SERPL CREATININE-BSD FRML MDRD: 66 ML/MIN/1.7M2
GLUCOSE SERPL-MCNC: 94 MG/DL (ref 70–99)
HCT VFR BLD AUTO: 45.2 % (ref 40–53)
HGB BLD-MCNC: 15.1 G/DL (ref 13.3–17.7)
MAGNESIUM SERPL-MCNC: 1.7 MG/DL (ref 1.6–2.3)
MCH RBC QN AUTO: 30.1 PG (ref 26.5–33)
MCHC RBC AUTO-ENTMCNC: 33.4 G/DL (ref 31.5–36.5)
MCV RBC AUTO: 90 FL (ref 78–100)
PHOSPHATE SERPL-MCNC: 3.6 MG/DL (ref 2.5–4.5)
PLATELET # BLD AUTO: 106 10E9/L (ref 150–450)
POTASSIUM SERPL-SCNC: 4.3 MMOL/L (ref 3.4–5.3)
PROT SERPL-MCNC: 7.2 G/DL (ref 6.8–8.8)
RBC # BLD AUTO: 5.02 10E12/L (ref 4.4–5.9)
SODIUM SERPL-SCNC: 138 MMOL/L (ref 133–144)
TACROLIMUS BLD-MCNC: 8.5 UG/L (ref 5–15)
TME LAST DOSE: NORMAL H
WBC # BLD AUTO: 5.2 10E9/L (ref 4–11)

## 2017-03-13 PROCEDURE — 80048 BASIC METABOLIC PNL TOTAL CA: CPT | Performed by: INTERNAL MEDICINE

## 2017-03-13 PROCEDURE — 83735 ASSAY OF MAGNESIUM: CPT | Performed by: INTERNAL MEDICINE

## 2017-03-13 PROCEDURE — 84100 ASSAY OF PHOSPHORUS: CPT | Performed by: INTERNAL MEDICINE

## 2017-03-13 PROCEDURE — 80076 HEPATIC FUNCTION PANEL: CPT | Performed by: INTERNAL MEDICINE

## 2017-03-13 PROCEDURE — 80197 ASSAY OF TACROLIMUS: CPT | Performed by: INTERNAL MEDICINE

## 2017-03-13 PROCEDURE — 85027 COMPLETE CBC AUTOMATED: CPT | Performed by: INTERNAL MEDICINE

## 2017-03-13 PROCEDURE — 36415 COLL VENOUS BLD VENIPUNCTURE: CPT | Performed by: INTERNAL MEDICINE

## 2017-03-17 ENCOUNTER — TELEPHONE (OUTPATIENT)
Dept: ENDOCRINOLOGY | Facility: CLINIC | Age: 37
End: 2017-03-17

## 2017-03-20 ENCOUNTER — TELEPHONE (OUTPATIENT)
Dept: ENDOCRINOLOGY | Facility: CLINIC | Age: 37
End: 2017-03-20

## 2017-03-20 NOTE — TELEPHONE ENCOUNTER
----- Message from Girish Narayanan MD sent at 3/19/2017 10:25 AM CDT -----  Regarding: RE: FNA thyroid is positive for PTC  Sounds good, thanks  ----- Message -----     From: Zuleyma Gray MD     Sent: 3/19/2017   9:40 AM       To: Girish aNrayanan MD  Subject: RE: FNA thyroid is positive for PTC              Thank you. Yes. Thyroid tx can wait. Please keep up posted on lymphoma plan.    Thank you,  Zuleyma    ----- Message -----     From: Girish Narayanan MD     Sent: 3/17/2017   5:00 PM       To: Zuleyma Gray MD  Subject: RE: FNA thyroid is positive for PTC              Thank you for update. We are at major decision taking point for his lymphoma curative therapy depending on his upcoming scans. i suggest finalizing that first within the next few month if you think thyroid can wait, please let me know and I will also keep you posted on timing of remaining therapy, thanks Gabe      ----- Message -----     From: Zuleyma Gray MD     Sent: 3/17/2017   4:51 PM       To: Girish Narayanan MD  Subject: FNA thyroid is positive for PTC                  Horace Rodriguez,    This is our mutual patient. 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 I will refer him for surgery. However, I would like to know how his lymphoma treatment plan is.    Thank you.  Zuleyma

## 2017-03-30 ENCOUNTER — APPOINTMENT (OUTPATIENT)
Dept: LAB | Facility: CLINIC | Age: 37
End: 2017-03-30
Attending: INTERNAL MEDICINE
Payer: COMMERCIAL

## 2017-03-30 ENCOUNTER — OFFICE VISIT (OUTPATIENT)
Dept: TRANSPLANT | Facility: CLINIC | Age: 37
End: 2017-03-30
Attending: INTERNAL MEDICINE
Payer: COMMERCIAL

## 2017-03-30 VITALS
WEIGHT: 206.35 LBS | SYSTOLIC BLOOD PRESSURE: 127 MMHG | DIASTOLIC BLOOD PRESSURE: 68 MMHG | RESPIRATION RATE: 18 BRPM | TEMPERATURE: 97 F | BODY MASS INDEX: 26.49 KG/M2 | OXYGEN SATURATION: 100 % | HEART RATE: 70 BPM

## 2017-03-30 DIAGNOSIS — D47.Z1 PTLD (POST-TRANSPLANT LYMPHOPROLIFERATIVE DISORDER) (H): Primary | ICD-10-CM

## 2017-03-30 DIAGNOSIS — Z94.4 LIVER REPLACED BY TRANSPLANT (H): ICD-10-CM

## 2017-03-30 LAB
ALBUMIN SERPL-MCNC: 4.2 G/DL (ref 3.4–5)
ALP SERPL-CCNC: 89 U/L (ref 40–150)
ALT SERPL W P-5'-P-CCNC: 26 U/L (ref 0–70)
ANION GAP SERPL CALCULATED.3IONS-SCNC: 6 MMOL/L (ref 3–14)
AST SERPL W P-5'-P-CCNC: 22 U/L (ref 0–45)
BILIRUB DIRECT SERPL-MCNC: 0.2 MG/DL (ref 0–0.2)
BILIRUB SERPL-MCNC: 0.6 MG/DL (ref 0.2–1.3)
BUN SERPL-MCNC: 13 MG/DL (ref 7–30)
CALCIUM SERPL-MCNC: 9.1 MG/DL (ref 8.5–10.1)
CHLORIDE SERPL-SCNC: 109 MMOL/L (ref 94–109)
CO2 SERPL-SCNC: 27 MMOL/L (ref 20–32)
CREAT SERPL-MCNC: 1.15 MG/DL (ref 0.66–1.25)
ERYTHROCYTE [DISTWIDTH] IN BLOOD BY AUTOMATED COUNT: 13.2 % (ref 10–15)
GFR SERPL CREATININE-BSD FRML MDRD: 72 ML/MIN/1.7M2
GLUCOSE SERPL-MCNC: 86 MG/DL (ref 70–99)
HCT VFR BLD AUTO: 44.7 % (ref 40–53)
HGB BLD-MCNC: 14.9 G/DL (ref 13.3–17.7)
MAGNESIUM SERPL-MCNC: 2 MG/DL (ref 1.6–2.3)
MCH RBC QN AUTO: 29.9 PG (ref 26.5–33)
MCHC RBC AUTO-ENTMCNC: 33.3 G/DL (ref 31.5–36.5)
MCV RBC AUTO: 90 FL (ref 78–100)
PHOSPHATE SERPL-MCNC: 2.5 MG/DL (ref 2.5–4.5)
PLATELET # BLD AUTO: 107 10E9/L (ref 150–450)
POTASSIUM SERPL-SCNC: 5.5 MMOL/L (ref 3.4–5.3)
PROT SERPL-MCNC: 7.3 G/DL (ref 6.8–8.8)
RBC # BLD AUTO: 4.98 10E12/L (ref 4.4–5.9)
SODIUM SERPL-SCNC: 141 MMOL/L (ref 133–144)
WBC # BLD AUTO: 5.1 10E9/L (ref 4–11)

## 2017-03-30 PROCEDURE — 99212 OFFICE O/P EST SF 10 MIN: CPT

## 2017-03-30 PROCEDURE — 83735 ASSAY OF MAGNESIUM: CPT | Performed by: INTERNAL MEDICINE

## 2017-03-30 PROCEDURE — 80076 HEPATIC FUNCTION PANEL: CPT | Performed by: INTERNAL MEDICINE

## 2017-03-30 PROCEDURE — 36415 COLL VENOUS BLD VENIPUNCTURE: CPT

## 2017-03-30 PROCEDURE — 80197 ASSAY OF TACROLIMUS: CPT | Performed by: INTERNAL MEDICINE

## 2017-03-30 PROCEDURE — 85027 COMPLETE CBC AUTOMATED: CPT | Performed by: INTERNAL MEDICINE

## 2017-03-30 PROCEDURE — 80048 BASIC METABOLIC PNL TOTAL CA: CPT | Performed by: INTERNAL MEDICINE

## 2017-03-30 PROCEDURE — 84100 ASSAY OF PHOSPHORUS: CPT | Performed by: INTERNAL MEDICINE

## 2017-03-30 RX ORDER — ACETAMINOPHEN 325 MG/1
650 TABLET ORAL ONCE
Status: CANCELLED
Start: 2017-04-05 | End: 2017-04-06

## 2017-03-30 RX ORDER — DIPHENHYDRAMINE HYDROCHLORIDE 50 MG/ML
50 INJECTION INTRAMUSCULAR; INTRAVENOUS
Status: CANCELLED
Start: 2017-04-05

## 2017-03-30 RX ORDER — EPINEPHRINE 0.3 MG/.3ML
0.3 INJECTION SUBCUTANEOUS EVERY 5 MIN PRN
Status: CANCELLED | OUTPATIENT
Start: 2017-04-05

## 2017-03-30 RX ORDER — METHYLPREDNISOLONE SODIUM SUCCINATE 125 MG/2ML
125 INJECTION, POWDER, LYOPHILIZED, FOR SOLUTION INTRAMUSCULAR; INTRAVENOUS
Status: CANCELLED
Start: 2017-04-05

## 2017-03-30 RX ORDER — DIPHENHYDRAMINE HCL 25 MG
50 CAPSULE ORAL ONCE
Status: CANCELLED
Start: 2017-04-05 | End: 2017-04-06

## 2017-03-30 RX ORDER — ALBUTEROL SULFATE 90 UG/1
1-2 AEROSOL, METERED RESPIRATORY (INHALATION)
Status: CANCELLED
Start: 2017-04-05

## 2017-03-30 RX ORDER — ALBUTEROL SULFATE 0.83 MG/ML
2.5 SOLUTION RESPIRATORY (INHALATION)
Status: CANCELLED | OUTPATIENT
Start: 2017-04-05

## 2017-03-30 RX ORDER — LORAZEPAM 2 MG/ML
0.5 INJECTION INTRAMUSCULAR EVERY 4 HOURS PRN
Status: CANCELLED
Start: 2017-04-05

## 2017-03-30 RX ORDER — MEPERIDINE HYDROCHLORIDE 25 MG/ML
25 INJECTION INTRAMUSCULAR; INTRAVENOUS; SUBCUTANEOUS
Status: CANCELLED
Start: 2017-04-05

## 2017-03-30 RX ORDER — MEPERIDINE HYDROCHLORIDE 25 MG/ML
25 INJECTION INTRAMUSCULAR; INTRAVENOUS; SUBCUTANEOUS EVERY 30 MIN PRN
Status: CANCELLED | OUTPATIENT
Start: 2017-04-05

## 2017-03-30 RX ORDER — SODIUM CHLORIDE 9 MG/ML
1000 INJECTION, SOLUTION INTRAVENOUS CONTINUOUS PRN
Status: CANCELLED
Start: 2017-04-05

## 2017-03-30 RX ORDER — PALONOSETRON 0.05 MG/ML
0.25 INJECTION, SOLUTION INTRAVENOUS ONCE
Status: CANCELLED
Start: 2017-04-05

## 2017-03-30 RX ORDER — HEPARIN SODIUM (PORCINE) LOCK FLUSH IV SOLN 100 UNIT/ML 100 UNIT/ML
5 SOLUTION INTRAVENOUS
Status: COMPLETED | OUTPATIENT
Start: 2017-04-03 | End: 2017-04-03

## 2017-03-30 ASSESSMENT — PAIN SCALES - GENERAL: PAINLEVEL: NO PAIN (0)

## 2017-03-30 NOTE — MR AVS SNAPSHOT
After Visit Summary   3/30/2017    Cricket Chen    MRN: [id number]           Patient Information     Date Of Birth          1980        Visit Information        Provider Department      3/30/2017 1:30 PM Girish Narayanan MD [facility] Blood [facility] Marrow Transplant        Today's Diagnoses     PTLD (post-transplant lymphoproliferative disorder) (H)    -  1    Liver replaced by transplant (H)              [facility] ([facility] [place])  [address] [place] [address]  Phone: [phone]  Clinic Hours:   Monday-Friday:    8am to 4:30pm   Weekends and holidays:    8am to noon (in general)  If your fever is 100.5  or greater,   call the clinic.  After hours call the   hospital at [phone] or   [phone]. Ask for the BMT   fellow for pediatric or adult patients            Follow-ups after your visit        Your next 10 appointments already scheduled     Apr 03, 2017   Procedure with Rajendra Jacques PA-C   [facility] ([facility])    [address] [place] [id number] [phone]           Located in the Clinics and Surgery Center at [address] [place] [address].   parking is very convenient and highly recommended.  is a $6 flat rate fee.  Both  and self parkers should enter the main arrival plaza from [facility]; parking attendants will direct you based on your parking preference.            Apr 03, 2017  1:30 PM CDT   (Arrive by 12:00 PM)   IR CHEST PORT PLACEMENT > 5 YRS OF AGE with [id number]   [facility] ([facility])    [address] [place] [id number]              1. Your doctor will need to do a history and physical within 7 days before this procedure. 2. Your doctor will which medications should not be taken the morning of the exam. 3. Laboratory tests are to be obtained by your doctor prior to the  exam (Basic Metabolic Panel, CBCP, PTT and INR) (No labs needed if you are having a tunneled catheter exchange or removal) 4. If you have allergies to x-ray contrast or iodine, contact your doctor or a Radiology nurse prior to the exam day for instructions. 5. Someone will need to drive you to and from the hospital. 6. If you are or may be pregnant, contact your doctor or a Radiology nurse prior to the day of the exam. 7. If you have diabetes, check with your doctor or a Radiology nurse to see if your insulin needs to be adjusted for the exam. 8. If you are taking a medication called Glucophage or Glucovance; these medications need to be held the day of the exam and for approximately 48 hours following. A blood sample must be drawn so your creatinine level can be checked before resuming this medication. 9. If you are taking Coumadin (to thin you blood) please contact your doctor or a Radiology nurse at least 3 days before the exam for special instructions. 10. You should not have received contrast within 48 hours of this exam. 11. The day before your exam you may eat your regular diet and are encouraged to drink at least 2 quarts of clear liquids. Drink no alcoholic beverages for 24 hours prior to the exam. 12. If you have a colostomy you will need to irrigate it with tap water at 8PM the evening before and again at 6AM the morning of the exam. 13. Do not smoke for 24 hours prior to the procedure. 14. Birth to 4 years: - Breast feeding must be stopped 4 hours prior to exam - Solid food or formula must be stopped 6 hours prior to exam - Tube feedings must be stopped 6 hours prior to exam 15. 4-10 years old: - Nothing to eat or drink 6 hours prior to exam 16. 10+ years old: - Nothing to eat or drink 8 hours prior to exam 17. The morning of the exam you may brush your teeth and take medications as directed with a sip of water. 18. When discharged, you cannot drive until morning, and an adult must be with you until  then. You should stay in the [facility] overnight. 19. Bring a list of all drugs you are taking; include supplements and over-the-counter medications. Wear comfortable clothes and leave your valuables at home.            Apr 04, 2017 12:10 PM CDT   (Arrive by 11:55 AM)   Return General Liver with Laureen Galvan MD   [facility] ([facility])    [address] [facility]  3rd [facility] [id number] [phone]            Apr 05, 2017  7:00 AM CDT   Infusion 360 with UC ONCOLOGY INFUSION, UC 10 ATC   [facility] ([facility])    [address] [facility]  [address]nd [facility] [id number] [phone]            Apr 06, 2017  3:00 PM CDT   Infusion 120 with UC ONCOLOGY INFUSION, UC 23 ATC   [facility] ([facility])    [address] [place] [address]  2nd [facility] [id number] [phone]            Apr 07, 2017  2:30 PM CDT   Infusion 120 with UC ONCOLOGY INFUSION, UC 25 ATC   [facility] ([facility])    [address] [facility]  [address] [facility] [id number] [phone]            Apr 18, 2017 10:30 AM CDT   (Arrive by 10:00 AM)   Return Kidney Transplant with Jake Sidhu MD   [facility] Nephrology ([facility])    [address] [place] [id number] [phone]              Future tests that were ordered for you today     Open Future Orders        Priority Expected Expires Ordered    IR Chest Port Placement > 5 Yrs of Age Routine  3/30/2018 3/30/2017            Who to contact     If you have questions or need follow up information about today's clinic visit or your schedule please contact [facility] directly at [phone].  Normal or non-critical lab and imaging results will be communicated to you by MyChart, letter or phone within 4  business days after the clinic has received the results. If you do not hear from us within 7 days, please contact the clinic through [facility] or phone. If you have a critical or abnormal lab result, we will notify you by phone as soon as possible.  Submit refill requests through [facility] or call your pharmacy and they will forward the refill request to us. Please allow 3 business days for your refill to be completed.          Additional Information About Your Visit        [facility] Information     [facility] gives you secure access to your electronic health record. If you see a primary care provider, you can also send messages to your care team and make appointments. If you have questions, please call your primary care clinic.  If you do not have a primary care provider, please call [phone] and they will assist you.        Care EveryWhere ID     This is your Care EveryWhere ID. This could be used by other organizations to access your [facility] medical records  [id number]        Your Vitals Were     Pulse Temperature Respirations Pulse Oximetry BMI (Body Mass Index)       70 97  F (36.1  C) (Oral) 18 100% 26.49 kg/m2        Blood Pressure from Last 3 Encounters:   03/30/17 127/68   03/03/17 128/72   03/02/17 127/72    Weight from Last 3 Encounters:   03/30/17 93.6 kg (206 lb 5.6 oz)   03/02/17 91.3 kg (201 lb 3.2 oz)   02/25/17 90.8 kg (200 lb 1.6 oz)              We Performed the Following     Basic metabolic panel     CBC with platelets     Echocardiogram Complete     Hepatic panel     Magnesium     Phosphorus        Recent Review Flowsheet Data     BMT Recent Results Latest Ref Rng & Units 2/9/2017 2/17/2017 2/24/2017 3/2/2017 3/13/2017 3/28/2017 3/30/2017    WBC 4.0 - 11.0 10e9/L 4.8 5.3 5.7 4.2 5.2 - 5.1    Hemoglobin 13.3 - 17.7 g/dL 14.9 13.7 13.5 14.8 15.1 - 14.9    Platelet Count 150 - 450 10e9/L 105(L) 99(L) 81(L) 103(L) 106(L) - 107(L)    Neutrophils (Absolute) 1.6 - 8.3 10e9/L - 4.2 4.5 3.2 - - -    INR  0.86 - 1.14 - - - - - - -    Sodium 133 - 144 mmol/L 141 141 140 140 138 - 141    Potassium 3.4 - 5.3 mmol/L 4.4 4.0 4.0 4.6 4.3 - 5.5(H)    Chloride 94 - 109 mmol/L 108 108 108 106 105 - 109    Glucose 70 - 99 mg/dL 106(H) 99 89 90 94 - 86    Urea Nitrogen 7 - 30 mg/dL 15 18 18 18 21 - 13    Creatinine 0.66 - 1.25 mg/dL 1.23 1.08 1.00 1.02 1.23 1.3(H) 1.15    Calcium (Total) 8.5 - 10.1 mg/dL 9.2 8.8 8.5 9.0 9.2 - 9.1    Protein (Total) 6.8 - 8.8 g/dL 7.8 6.9 7.1 7.6 7.2 - 7.3    Albumin 3.4 - 5.0 g/dL 4.5 4.0 4.0 4.4 4.1 - 4.2    Bilirubin (Direct) 0.0 - 0.2 mg/dL 0.1 0.2 0.2 - 0.1 - 0.2    Alkaline Phosphatase 40 - 150 U/L 122 94 86 96 94 - 89    AST 0 - 45 U/L 12 14 13 15 15 - 22    ALT 0 - 70 U/L 26 21 19 23 23 - 26    MCV 78 - 100 fl 90 88 87 88 90 - 90               Primary Care Provider Office Phone # Fax #    Eugenia Perez [phone]       [address] [address]        Thank you!     Thank you for choosing [facility]  for your care. Our goal is always to provide you with excellent care. Hearing back from our patients is one way we can continue to improve our services. Please take a few minutes to complete the written survey that you may receive in the mail after your visit with us. Thank you!             Your Updated Medication List - Protect others around you: Learn how to safely use, store and throw away your medicines at www.disposemymeds.org.          This list is accurate as of: 3/30/17  3:57 PM.  Always use your most recent med list.                   Brand Name Dispense Instructions for use    amLODIPine 10 MG tablet    NORVASC    90 tablet    Take 1 tablet (10 mg) by mouth daily       lisinopril 5 MG tablet    PRINIVIL/ZESTRIL    90 tablet    Take 1 tablet (5 mg) by mouth daily       magnesium oxide 400 (241.3 MG) MG tablet    MAG-OX    120 tablet    Take 2 tablets (800 mg) by mouth 2 times daily        mycophenolate 250 MG capsule    CELLCEPT - GENERIC EQUIVALENT    60 capsule    Take 1 capsule (250 mg) by mouth 2 times daily       sulfamethoxazole-trimethoprim 400-80 MG per tablet    BACTRIM/SEPTRA    30 tablet    Take 1 tablet by mouth daily       tacrolimus capsule     180 capsule    Take 3 capsules (3 mg) by mouth 2 times daily       traMADol 50 MG tablet    ULTRAM    45 tablet    Take 1 tablet (50 mg) by mouth every 8 hours as needed for moderate pain       vitamin D 2000 UNITS tablet     100 tablet    Take 2,000 Units by mouth daily

## 2017-03-30 NOTE — NURSING NOTE
"Cricket Chen is a 36 year old male who presents for:  Chief Complaint   Patient presents with     Blood Draw     labs drawn by RN and vitals taken by CMA         Initial Vitals:  /68 (BP Location: Right arm, Patient Position: Chair, Cuff Size: Adult Regular)  Pulse 70  Temp 97  F (36.1  C) (Oral)  Resp 18  Wt 93.6 kg (206 lb 5.6 oz)  SpO2 100%  BMI 26.49 kg/m2 Estimated body mass index is 26.49 kg/(m^2) as calculated from the following:    Height as of 2/25/17: 1.88 m (6' 2\").    Weight as of this encounter: 93.6 kg (206 lb 5.6 oz).. Body surface area is 2.21 meters squared. BP completed using cuff size: regular  No Pain (0) No LMP for male patient. Allergies and medications reviewed.     Medications: Medication refills not needed today.  Pharmacy name entered into [facility]:    [facility] - [place], [place] - [address] [place] [address] [phone]  [facility] - [place], [place] -  [address]     Comments: Pt is here for MD visit    6 minutes for nursing intake (face to face time)   Gabrielle Brown MA        "

## 2017-03-30 NOTE — PROGRESS NOTES
REASON FOR VISIT:  Followup for history of PTLD, status post 4 weekly Rituxan with recent restaging evaluation, who presents for his followup visit today.      HISTORY OF PRESENT ILLNESS/REVIEW OF SYSTEMS:  Mr. Chen is a very pleasant 36-year-old gentleman, with a prior history are outlined above, who was diagnosed with PTLD early this year based on an excisional lymph node biopsy from 02/01/2017 (right neck biopsy of partial necrotic mass).  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        His dose of CellCept was reduced from 1500 b.i.d. to 750 twice a day, and he has been taking tacrolimus 3 mg twice a day, and most recently 2 mg twice a day per his report today.  He has tolerated 4 infusions of Rituxan with no major complications, and he has been getting these as a rapid infusion.      He recently underwent restaging CT scans, and presents today to discuss his interval progress and future plan of care.  Of note, his diagnostic PET/CT scan identified a thyroid nodule, which was consistent with papillary thyroid carcinoma based on the ultrasound-guided fine needle aspiration performed by our Endocrinology colleagues.      REVIEW OF SYMPTOMS:  The patient denies any recent infections, fevers, chills, drenching night sweats.  His energy and appetite have been stable.  He reports no new complaints.  He denies any chest pain or dyspnea on exertion.  He continues to have stable chronic edema in both of his ankles in the setting of amlodipine use.      The rest of 12 points of ROS were reviewed and found to be negative, unless as mentioned above.  Specifically, the patient is not aware about any new lumps across his body.      PHYSICAL EXAMINATION:   VITAL SIGNS:  Reviewed in Epic and found to be acceptable.   GENERAL:  Not in acute distress, alert and oriented, well-nourished and hydrated.   ECOG PERFORMANCE  STATUS:  0.   KPS:  100%.   GENERAL:  Not in acute distress, alert and oriented, well-nourished and hydrated.   HEENT:  Moist mucous membranes with no thrush.  Pupils are equally round and reactive to light with no conjunctival erythema or jaundice.   NECK:  No palpable masses.  Well-healed scar in the right neck and no palpable lymphadenopathy.   CARDIOVASCULAR:  Regular rate and rhythm, no murmurs.   PULMONARY:  Clear to auscultation bilaterally.   ABDOMEN:  Soft, nontender and nondistended, no palpable masses.   EXTREMITIES:  1+ bilateral ankle edema.   SKIN:  No rashes, except from the face with seborrheic type of rash in bilateral cheek areas extending down to both mandibles.   NEUROLOGIC:  Grossly nonfocal.      LABORATORY DATA:   Normal WBC with a slightly lower platelets at 107 and normal hemoglobin.   Potassium slightly high at 5.5 today, but normal creatinine of 1.15.   Normal phosphorus and normal magnesium level of 2.2.   Liver function tests within normal limits.   His recent EBV titers from 03/08/2017 were undetectable.      BIOPSIES:  Recent thyroid biopsy (FNA) consistent with papillary thyroid carcinoma.      IMAGING:  Recent imaging from 03/28/2017 demonstrated CR in the neck with borderline enlarged lymph node by size criteria at right level 3, but otherwise resolution of any bulky mass or any necrotic changes seen before.      CT of the chest, abdomen and pelvis demonstrated decreased cortical thickness of previously seen hypermetabolic right axillary lymph node (NM by CT criteria).      ASSESSMENT AND PLAN:  This is a very pleasant 56-year-old gentleman with a prior history of combined liver/kidney transplant back in 05/2016, who recently developed PTLD EBV-positive monomorphic DLBCL (stage III).  Treated so far with 4 weekly infusions of rituximab, who presents for his followup visit following recent restaging evaluation with CT scans.     - PTLD/DLBCL:  We reviewed with the patient his  interval progress with specific focus on the results of his recent CT scans.  I personally reviewed his CT scans and discussed those extensively with our radiologists.  It is my impression that the overall response achieved so far by CT criteria is a partial based on the presence of persistent lymph node enlargement in the right axillary area (AR by CT criteria).  There has been, however, almost complete resolution of his neck lymphadenopathy.  Therefore, I recommended that the patient proceed with 4 cycles of combination chemotherapy, which would normally be 4 cycles of R-CHOP; however, given his prior history of cardiac surgeries and reconstructions with persistently low ejection fraction of 30-35% range, I recommended against the use of an anthracycline-containing regimen, but to use instead R-CEOP regimen with substitution of Hydroxydaunomycin with etoposide administered for 3 consecutive days (D1 IV and days 2-3 oral VP16).      We then reviewed with the patient in detail the risks and benefits of this combination regimen with specific focus on specific side effects of each chemotherapy agent, such as Cytoxan, vincristine, etoposide and prednisone.  We reviewed in detail associated morbidity and mortality with specific focus on the risk of infections and toxicities associated with the specific chemotherapies.  He had an opportunity to ask multiple questions, all of which were answered to the best of my ability, and he voiced understanding and agreement to proceed with his chemotherapy regimen as early as next week.  I also personally discussed his prior cardiac history with surgical reconstructions performed in the past with his cardiologist from [facility]. Apparently, his recent MRI continued to demonstrate a low ejection fraction in the 30-35% range, and his cardiologist was not opposed to the placement of central lines, such as a port, ending up in the left atrium given his particular anatomy following  cardiac reconstructions.  Therefore, I will plan on port placement for his upcoming chemo.     We will defer obtaining a transthoracic echocardiogram given the results of his recent MRI at the outside hospital still demonstrating a somewhat lower ejection fraction in the 30-35% range as outlined above.      We will also consider deferring his thyroid surgery until after completion of his ongoing chemotherapy with 4 cycles (every 3 weeks) of R-CEOP.  The patient will need to see a provider every 3 weeks in consideration for subsequent cycles of therapy, and we will also plan on using Neulasta with this chemotherapy regimen.  I will defer further reduction of immunosuppression to his transplant physicians, and we will need to plan on timely reconstitution of his immunosuppressive regimen upon completion of 4 cycles of chemoimmunotherapy and plan for his PTLD.      I spent over 50% of this 40-minute encounter in counseling the patient, updating him on his interval progress, and outlining the ongoing plan of care as outlined above.  Overall, the complexity of the case is very high.      Girish Narayanan MD PhD  Hematology, Oncology and Transplantation  [facility]    ------------------------------------------------------------------------------------------------------------------  This note was created with the use of a speech recognition software occasionally resulting in unintended misspelling errors despite my best efforts to detect and correct those.

## 2017-03-30 NOTE — NURSING NOTE
Chief Complaint   Patient presents with     Blood Draw     labs drawn by RN and vitals taken by SUSHANT Alcantara CMA March 30, 2017

## 2017-03-30 NOTE — LETTER
3/30/2017     RE: Cricket Chen  [address]  [facility] [address]     Dear Colleague,    Thank you for referring your patient, Cricket Chen, to the [facility] BLOOD AND MARROW TRANSPLANT at [facility]. Please see a copy of my visit note below.    REASON FOR VISIT:  Followup for history of PTLD, status post 4 weekly Rituxan with recent restaging evaluation, who presents for his followup visit today.      HISTORY OF PRESENT ILLNESS/REVIEW OF SYSTEMS:  Mr. Chen is a very pleasant 36-year-old gentleman, with a prior history are outlined above, who was diagnosed with PTLD early this year based on an excisional lymph node biopsy from 02/01/2017 (right neck biopsy of partial necrotic mass).  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        His dose of CellCept was reduced from 1500 b.i.d. to 750 twice a day, and he has been taking tacrolimus 3 mg twice a day, and most recently 2 mg twice a day per his report today.  He has tolerated 4 infusions of Rituxan with no major complications, and he has been getting these as a rapid infusion.      He recently underwent restaging CT scans, and presents today to discuss his interval progress and future plan of care.  Of note, his diagnostic PET/CT scan identified a thyroid nodule, which was consistent with papillary thyroid carcinoma based on the ultrasound-guided fine needle aspiration performed by our Endocrinology colleagues.      REVIEW OF SYMPTOMS:  The patient denies any recent infections, fevers, chills, drenching night sweats.  His energy and appetite have been stable.  He reports no new complaints.  He denies any chest pain or dyspnea on exertion.  He continues to have stable chronic edema in both of his ankles in the setting of amlodipine use.      The rest of 12 points of ROS were reviewed and found to be negative,  unless as mentioned above.  Specifically, the patient is not aware about any new lumps across his body.      PHYSICAL EXAMINATION:   VITAL SIGNS:  Reviewed in Epic and found to be acceptable.   GENERAL:  Not in acute distress, alert and oriented, well-nourished and hydrated.   ECOG PERFORMANCE STATUS:  0.   KPS:  100%.   GENERAL:  Not in acute distress, alert and oriented, well-nourished and hydrated.   HEENT:  Moist mucous membranes with no thrush.  Pupils are equally round and reactive to light with no conjunctival erythema or jaundice.   NECK:  No palpable masses.  Well-healed scar in the right neck and no palpable lymphadenopathy.   CARDIOVASCULAR:  Regular rate and rhythm, no murmurs.   PULMONARY:  Clear to auscultation bilaterally.   ABDOMEN:  Soft, nontender and nondistended, no palpable masses.   EXTREMITIES:  1+ bilateral ankle edema.   SKIN:  No rashes, except from the face with seborrheic type of rash in bilateral cheek areas extending down to both mandibles.   NEUROLOGIC:  Grossly nonfocal.      LABORATORY DATA:   Normal WBC with a slightly lower platelets at 107 and normal hemoglobin.   Potassium slightly high at 5.5 today, but normal creatinine of 1.15.   Normal phosphorus and normal magnesium level of 2.2.   Liver function tests within normal limits.   His recent EBV titers from 03/08/2017 were undetectable.      BIOPSIES:  Recent thyroid biopsy (FNA) consistent with papillary thyroid carcinoma.      IMAGING:  Recent imaging from 03/28/2017 demonstrated CR in the neck with borderline enlarged lymph node by size criteria at right level 3, but otherwise resolution of any bulky mass or any necrotic changes seen before.      CT of the chest, abdomen and pelvis demonstrated decreased cortical thickness of previously seen hypermetabolic right axillary lymph node (RI by CT criteria).      ASSESSMENT AND PLAN:  This is a very pleasant 56-year-old gentleman with a prior history of combined liver/kidney  transplant back in 05/2016, who recently developed PTLD EBV-positive monomorphic DLBCL (stage III).  Treated so far with 4 weekly infusions of rituximab, who presents for his followup visit following recent restaging evaluation with CT scans.     - PTLD/DLBCL:  We reviewed with the patient his interval progress with specific focus on the results of his recent CT scans.  I personally reviewed his CT scans and discussed those extensively with our radiologists.  It is my impression that the overall response achieved so far by CT criteria is a partial based on the presence of persistent lymph node enlargement in the right axillary area (ND by CT criteria).  There has been, however, almost complete resolution of his neck lymphadenopathy.  Therefore, I recommended that the patient proceed with 4 cycles of combination chemotherapy, which would normally be 4 cycles of R-CHOP; however, given his prior history of cardiac surgeries and reconstructions with persistently low ejection fraction of 30-35% range, I recommended against the use of an anthracycline-containing regimen, but to use instead R-CEOP regimen with substitution of Hydroxydaunomycin with etoposide administered for 3 consecutive days (D1 IV and days 2-3 oral VP16).      We then reviewed with the patient in detail the risks and benefits of this combination regimen with specific focus on specific side effects of each chemotherapy agent, such as Cytoxan, vincristine, etoposide and prednisone.  We reviewed in detail associated morbidity and mortality with specific focus on the risk of infections and toxicities associated with the specific chemotherapies.  He had an opportunity to ask multiple questions, all of which were answered to the best of my ability, and he voiced understanding and agreement to proceed with his chemotherapy regimen as early as next week.  I also personally discussed his prior cardiac history with surgical reconstructions performed in the past  with his cardiologist from [facility]. Apparently, his recent MRI continued to demonstrate a low ejection fraction in the 30-35% range, and his cardiologist was not opposed to the placement of central lines, such as a port, ending up in the left atrium given his particular anatomy following cardiac reconstructions.  Therefore, I will plan on port placement for his upcoming chemo.     We will defer obtaining a transthoracic echocardiogram given the results of his recent MRI at the outside hospital still demonstrating a somewhat lower ejection fraction in the 30-35% range as outlined above.      We will also consider deferring his thyroid surgery until after completion of his ongoing chemotherapy with 4 cycles (every 3 weeks) of R-CEOP.  The patient will need to see a provider every 3 weeks in consideration for subsequent cycles of therapy, and we will also plan on using Neulasta with this chemotherapy regimen.  I will defer further reduction of immunosuppression to his transplant physicians, and we will need to plan on timely reconstitution of his immunosuppressive regimen upon completion of 4 cycles of chemoimmunotherapy and plan for his PTLD.      I spent over 50% of this 40-minute encounter in counseling the patient, updating him on his interval progress, and outlining the ongoing plan of care as outlined above.  Overall, the complexity of the case is very high.      Girish Narayanan MD PhD  Hematology, Oncology and Transplantation  [facility]    ------------------------------------------------------------------------------------------------------------------  This note was created with the use of a speech recognition software occasionally resulting in unintended misspelling errors despite my best efforts to detect and correct those.

## 2017-03-31 ENCOUNTER — ANESTHESIA EVENT (OUTPATIENT)
Dept: SURGERY | Facility: AMBULATORY SURGERY CENTER | Age: 37
End: 2017-03-31

## 2017-03-31 NOTE — NURSING NOTE
Writer educated Cricket Chen on new chemotherapy regime. Cricket to begin on R-CHOP, replacing the Dox with Etoposide due to low EF from cardiac history. To begin next week times three days. Port  Placement prior to start, cleared by his cardiologist, Doctor Erasmo Calvo at [place] per discussion with Doctor Narayanan. Information provided to him on Rituxan, although he has received this in the past without problems, vincristine, Cytoxan, Etoposide, and prednisone. Reviewed each drug and possible side effects with each. Reviewed low blood counts and why this happens and how we monitor them. Stressed the importance of calling temperature over 100.4 and what number to call. He will have weekly blood counts to monitor labs. Explained noman and when it occurs. Reviewed a cycle and what to watch for. Educated on neutropenic precautions, he will receive Neulasta on day 4. Information provided on Neulasta. Writer jax graph to explain the above. Explained other side effects including hair loss, nausea/ vomiting, constipation/diarrhea and importance of keeping well hydrated with the cytoxan. Also review neuropathy with the vincristine. Encouraged questions, has writers resource number to call if further questions. Will give white notebook at next meeting. Overwhelmed with scan results and all this new information given today. Will continue to follow. Answered questions, provided information and support.

## 2017-04-03 ENCOUNTER — HOSPITAL ENCOUNTER (OUTPATIENT)
Facility: AMBULATORY SURGERY CENTER | Age: 37
End: 2017-04-03
Attending: PHYSICIAN ASSISTANT

## 2017-04-03 ENCOUNTER — ANESTHESIA (OUTPATIENT)
Dept: SURGERY | Facility: AMBULATORY SURGERY CENTER | Age: 37
End: 2017-04-03

## 2017-04-03 ENCOUNTER — SURGERY (OUTPATIENT)
Age: 37
End: 2017-04-03

## 2017-04-03 VITALS
HEART RATE: 73 BPM | TEMPERATURE: 98.2 F | RESPIRATION RATE: 18 BRPM | HEIGHT: 70 IN | WEIGHT: 200 LBS | BODY MASS INDEX: 28.63 KG/M2 | DIASTOLIC BLOOD PRESSURE: 80 MMHG | SYSTOLIC BLOOD PRESSURE: 141 MMHG | OXYGEN SATURATION: 100 %

## 2017-04-03 LAB
COPATH REPORT: NORMAL
INR PPP: 1.09 (ref 0.86–1.14)

## 2017-04-03 RX ORDER — LIDOCAINE 40 MG/G
CREAM TOPICAL
Status: DISCONTINUED | OUTPATIENT
Start: 2017-04-03 | End: 2017-04-04 | Stop reason: HOSPADM

## 2017-04-03 RX ORDER — ONDANSETRON 2 MG/ML
4 INJECTION INTRAMUSCULAR; INTRAVENOUS EVERY 30 MIN PRN
Status: DISCONTINUED | OUTPATIENT
Start: 2017-04-03 | End: 2017-04-04 | Stop reason: HOSPADM

## 2017-04-03 RX ORDER — HEPARIN SODIUM,PORCINE 10 UNIT/ML
5 VIAL (ML) INTRAVENOUS EVERY 24 HOURS
Status: DISCONTINUED | OUTPATIENT
Start: 2017-04-03 | End: 2017-04-04 | Stop reason: HOSPADM

## 2017-04-03 RX ORDER — KETOROLAC TROMETHAMINE 30 MG/ML
30 INJECTION, SOLUTION INTRAMUSCULAR; INTRAVENOUS EVERY 6 HOURS PRN
Status: DISCONTINUED | OUTPATIENT
Start: 2017-04-03 | End: 2017-04-04 | Stop reason: HOSPADM

## 2017-04-03 RX ORDER — ACETAMINOPHEN 325 MG/1
975 TABLET ORAL ONCE
Status: DISCONTINUED | OUTPATIENT
Start: 2017-04-03 | End: 2017-04-04 | Stop reason: HOSPADM

## 2017-04-03 RX ORDER — GABAPENTIN 300 MG/1
300 CAPSULE ORAL ONCE
Status: DISCONTINUED | OUTPATIENT
Start: 2017-04-03 | End: 2017-04-04 | Stop reason: HOSPADM

## 2017-04-03 RX ORDER — HEPARIN SODIUM (PORCINE) LOCK FLUSH IV SOLN 100 UNIT/ML 100 UNIT/ML
5 SOLUTION INTRAVENOUS
Status: DISCONTINUED | OUTPATIENT
Start: 2017-04-03 | End: 2017-04-04 | Stop reason: HOSPADM

## 2017-04-03 RX ORDER — MEPERIDINE HYDROCHLORIDE 25 MG/ML
12.5 INJECTION INTRAMUSCULAR; INTRAVENOUS; SUBCUTANEOUS
Status: DISCONTINUED | OUTPATIENT
Start: 2017-04-03 | End: 2017-04-04 | Stop reason: HOSPADM

## 2017-04-03 RX ORDER — LIDOCAINE HYDROCHLORIDE 20 MG/ML
INJECTION, SOLUTION INFILTRATION; PERINEURAL PRN
Status: DISCONTINUED | OUTPATIENT
Start: 2017-04-03 | End: 2017-04-03

## 2017-04-03 RX ORDER — PROPOFOL 10 MG/ML
INJECTION, EMULSION INTRAVENOUS PRN
Status: DISCONTINUED | OUTPATIENT
Start: 2017-04-03 | End: 2017-04-03

## 2017-04-03 RX ORDER — SODIUM CHLORIDE, SODIUM LACTATE, POTASSIUM CHLORIDE, CALCIUM CHLORIDE 600; 310; 30; 20 MG/100ML; MG/100ML; MG/100ML; MG/100ML
INJECTION, SOLUTION INTRAVENOUS CONTINUOUS
Status: DISCONTINUED | OUTPATIENT
Start: 2017-04-03 | End: 2017-04-04 | Stop reason: HOSPADM

## 2017-04-03 RX ORDER — PROPOFOL 10 MG/ML
INJECTION, EMULSION INTRAVENOUS CONTINUOUS PRN
Status: DISCONTINUED | OUTPATIENT
Start: 2017-04-03 | End: 2017-04-03

## 2017-04-03 RX ORDER — FENTANYL CITRATE 50 UG/ML
25-50 INJECTION, SOLUTION INTRAMUSCULAR; INTRAVENOUS
Status: DISCONTINUED | OUTPATIENT
Start: 2017-04-03 | End: 2017-04-04 | Stop reason: HOSPADM

## 2017-04-03 RX ORDER — SODIUM CHLORIDE, SODIUM LACTATE, POTASSIUM CHLORIDE, CALCIUM CHLORIDE 600; 310; 30; 20 MG/100ML; MG/100ML; MG/100ML; MG/100ML
INJECTION, SOLUTION INTRAVENOUS CONTINUOUS PRN
Status: DISCONTINUED | OUTPATIENT
Start: 2017-04-03 | End: 2017-04-03

## 2017-04-03 RX ORDER — ONDANSETRON 4 MG/1
4 TABLET, ORALLY DISINTEGRATING ORAL EVERY 30 MIN PRN
Status: DISCONTINUED | OUTPATIENT
Start: 2017-04-03 | End: 2017-04-04 | Stop reason: HOSPADM

## 2017-04-03 RX ORDER — NALOXONE HYDROCHLORIDE 0.4 MG/ML
.1-.4 INJECTION, SOLUTION INTRAMUSCULAR; INTRAVENOUS; SUBCUTANEOUS
Status: DISCONTINUED | OUTPATIENT
Start: 2017-04-03 | End: 2017-04-04 | Stop reason: HOSPADM

## 2017-04-03 RX ADMIN — Medication 15 ML: at 14:12

## 2017-04-03 RX ADMIN — SODIUM CHLORIDE, SODIUM LACTATE, POTASSIUM CHLORIDE, CALCIUM CHLORIDE: 600; 310; 30; 20 INJECTION, SOLUTION INTRAVENOUS at 13:03

## 2017-04-03 RX ADMIN — HEPARIN SODIUM (PORCINE) LOCK FLUSH IV SOLN 100 UNIT/ML 5 ML: 100 SOLUTION at 14:12

## 2017-04-03 RX ADMIN — LIDOCAINE HYDROCHLORIDE 50 MG: 20 INJECTION, SOLUTION INFILTRATION; PERINEURAL at 13:29

## 2017-04-03 RX ADMIN — PROPOFOL 75 MCG/KG/MIN: 10 INJECTION, EMULSION INTRAVENOUS at 13:45

## 2017-04-03 RX ADMIN — PROPOFOL 20 MG: 10 INJECTION, EMULSION INTRAVENOUS at 13:33

## 2017-04-03 RX ADMIN — PROPOFOL 30 MG: 10 INJECTION, EMULSION INTRAVENOUS at 13:37

## 2017-04-03 RX ADMIN — PROPOFOL 30 MG: 10 INJECTION, EMULSION INTRAVENOUS at 13:30

## 2017-04-03 RX ADMIN — PROPOFOL 20 MG: 10 INJECTION, EMULSION INTRAVENOUS at 13:36

## 2017-04-03 ASSESSMENT — ENCOUNTER SYMPTOMS: DYSRHYTHMIAS: 1

## 2017-04-03 NOTE — ANESTHESIA CARE TRANSFER NOTE
Patient: Cricket Chen    Procedure(s):  Single Lumen Port Placement - Wound Class: I-Clean    Diagnosis: Post Transplant Lymphoproliferative Disorder  Diagnosis Additional Information: No value filed.    Anesthesia Type:   MAC     Note:  Airway :Room Air  Patient transferred to:Phase II  Comments: Patient to Phase II on RA/native airway and is awake, alert, and verbal. Report to RN and transfer of care. BP:147/78  HR: 61 Temp: 98.4 RR: 16 SpO2: 99% in RA      Vitals: (Last set prior to Anesthesia Care Transfer)    CRNA VITALS  4/3/2017 1404 - 4/3/2017 1437      4/3/2017             Pulse: 65    Ht Rate: 66    SpO2: 98 %    Resp Rate (observed): (!)  1    Resp Rate (set): 10                Electronically Signed By: DARYN Colunga CRNA  April 3, 2017  2:37 PM

## 2017-04-03 NOTE — IP AVS SNAPSHOT
MRN:[id number]                      After Visit Summary   4/3/2017    Cricket Chen    MRN: [id number]           Thank you!     Thank you for choosing [facility] for your care. Our goal is always to provide you with excellent care. Hearing back from our patients is one way we can continue to improve our services. Please take a few minutes to complete the written survey that you may receive in the mail after you visit with us. Thank you!        Patient Information     Date Of Birth          1980        About your hospital stay     You were admitted on:  April 3, 2017 You last received care in the:  [facility]    You were discharged on:  April 3, 2017       Who to Call     For medical emergencies, please call 911.  For non-urgent questions about your medical care, please call your primary care provider or clinic, [phone]  For questions related to your surgery, please call your surgery clinic        Attending Provider     Provider Rajendra Hardy PA-C Physician Assistant       Primary Care Provider Office Phone # Fax #    Eugenia Perez [phone]       [address] [address]        Your next 10 appointments already scheduled     Apr 04, 2017 12:10 PM CDT   (Arrive by 11:55 AM)   Return General Liver with Laureen Galvan MD   [facility] ([facility])    [address] [place] [address]  3rd [facility] [id number] [phone]            Apr 05, 2017  6:45 AM CDT   Masonic Lab Draw with UC MASONIC LAB DRAW   [facility] Lab Draw ([facility])    [address] [place] [id number] [phone]            Apr 05, 2017  7:00 AM CDT   Infusion 360 with UC ONCOLOGY INFUSION, UC 10 ATC   [facility] ([facility])    [address] [place] [address]  [facility] [address]   [phone]            Apr 06, 2017  3:00 PM CDT   Infusion 120 with UC ONCOLOGY INFUSION, UC 23 ATC   [facility] ([facility])    [address] [place] [address]  2nd Floor  [facility] [address]   [phone]            Apr 07, 2017  2:30 PM CDT   Infusion 120 with UC ONCOLOGY INFUSION, UC 25 ATC   [facility] ([facility])    [address] [place] [address]  2nd [facility] [address]   [phone]            Apr 12, 2017 11:15 AM CDT   Masonic Lab Draw with UC MASONIC LAB DRAW   [facility] Masonic Lab Draw ([facility])    [address] [place] [address]   [phone]            Apr 18, 2017  9:45 AM CDT   Masonic Lab Draw with UC MASONIC LAB DRAW   [facility] Masonic Lab Draw ([facility])    [address] [place] [address]   [phone]              Further instructions from your care team       [facility]  Home Care Following Anesthesia  For 24 hours after surgery:  1. Get plenty of rest.  A responsible adult must stay with you for at least 24 hours after you leave the surgery center.  2. Do not drive or use heavy equipment.  If you have weakness or tingling, don't drive or use heavy equipment until this feeling goes away.   3. Do not drink alcohol.   4. Avoid strenuous or risky activities.  Ask for help when climbing stairs.  5. You may feel lightheaded.  IF so, sit for a few minutes before standing.  Have someone help you get up.   6. If you have nausea (feel sick to your stomach): Drink only clear liquids such as apple juice, ginger ale, broth or 7-Up.  Rest may also help.  Be sure to drink enough fluids.  Move to a regular diet as you feel able.   7. You may have a slight fever.  Call the doctor if your fever is over 100 F (37.7 C) (taken under the  tongue) or lasts longer than 24 hours.  8. You may have a dry mouth, a sore throat, muscle aches or trouble sleeping. These should go away after 24 hours.  9. Do not make important or legal decisions.               Tips for taking pain medications  To get the best pain relief possible, remember these points:    Take pain medications as directed, before pain becomes severe.    Pain medication can upset your stomach: taking it with food may help.    Constipation is a common side effect of pain medication. Drink plenty of  fluids.    Eat foods high in fiber. Take a stool softener if recommended by your doctor or pharmacist.    Do not drink alcohol, drive or operate machinery while taking pain medications.    Ask about other ways to control pain, such as with heat, ice or relaxation.    Call a doctor for any of the followin. Signs of infection (fever, growing tenderness at the surgery site, a large amount of drainage or bleeding, severe pain, foul-smelling drainage, redness, swelling).  2. It has been over 8 to 10 hours since surgery and you are still not able to urinate (pass water).  3. Headache for over 24 hours.  4. Numbness, tingling or weakness the day after surgery (if you had spinal anesthesia).  Your care provider,  Rajendra Jacques                         Or dial [phone] and ask for the resident on call for:  Interventional Radiology  For emergency care, call the:  [facility]:  [phone] (TTY for hearing impaired: [phone])      A collaboration between [facility] and [facility]  Experts in minimally invasive, targeted treatments performed using imaging guidance    [facility]  Discharge Instructions For   Placement of Venous Access Device,  Port Catheter or Tunneled Central Line    Today you had a procedure today to install a venous access device; either a tunneled central vein catheter or a subcutaneous  port catheter.    One of our Radiology PAs performed this procedure for you today:  ? Claude Norton, [facility], RASHI  ? CLAUDIA Montoya PA-C  ? Catracho Jacques PA-C  ? Liz Harper MS, RASHI    After you go home:  - Drink plenty of fluids.  Generally 6-8 (8 ounce) glasses a day is recommended.  - Resume your regular diet unless otherwise ordered by a medical provider.  - Keep any applied tape/gauze dressings clean and dry.  Change tape/gauze dressings if they get wet or soiled.  - You may shower the following day after procedure, however cover and protect from moisture any tape/gauze dressings.  You may let water hit and run over dried skin glue, but do not scrub.  Pat the area dry after showering.  - Port placement incisions are closed with absorbable suture, meaning they do not need to be removed at a later date, and a topical skin adhesive (skin glue).  This glue will wear off in 7-14 days.  Do not remove before this time.  If 14 days have passed and residual glue is present, you may gently remove it.  - Do not apply gels, lotions, or ointments to the glue site for the first 10 days as this may cause the glue to prematurely soften and fail.  - Do not perform strenuous activities or lift greater than 10 pounds for the next three days.  - If there is bleeding or oozing from the procedure site, apply firm pressure to the area for 5-10 minutes.  If the bleeding continues seek medical advice at the numbers below.  - Mild procedure site discomfort can be treated with an ice pack and over-the-counter pain relievers.        For 24 hours after any sedation used:  - Relax and take it easy.  No strenuous activities.  - Do not drive or operate machines at home or at work.  - No alcohol consumption.  - Do not make any important or legal decisions.    Call our Interventional Radiology (IR) service if:  - If you start bleeding from the procedure site.  If you do start to bleed from the site, lie down and hold some pressure on  "the site.  Our radiology provider can help you decide if you need to return to the hospital.  - If you have new or worsening pain related to the procedure.  - If you have concerning swelling at the procedure site.  - If you develop persistent nausea or vomiting.  - If you develop hives or a rash or any unexplained itching.  - If you have a fever of greater than 100.5  F and chills in the first 5 days after procedure.  - Any other concerns related to your procedure.      [facility]  Interventional Radiology (IR)  [address] [facility] [address] [facility]  2nd Floor, [facility] [place] [address]    Contact Number:  [phone]  (IR control desk)  - Monday - Friday 8:00 am - 4:30 pm    After hours for urgent concerns:  [phone]  - After 4:30 pm Monday - Friday, Weekends and Holidays.   - Ask for Interventional Radiology on-call.  Someone is available 24 hours a day.  - [facility] toll free number:  [phone]                    Pending Results     Date and Time Order Name Status Description    4/3/2017 1326 IR CHEST PORT PLACEMENT > 5 YRS OF AGE In process             Admission Information     Date & Time Provider Department Dept. Phone    4/3/2017 Rajendra Jacques PA-C [facility] [phone]      Your Vitals Were     Blood Pressure Pulse Temperature Respirations Height Weight    126/75 73 98.8  F (37.1  C) (Tympanic) 18 1.778 m (5' 10\") 90.7 kg (200 lb)    Pulse Oximetry BMI (Body Mass Index)                99% 28.7 kg/m2          [employer] Information     [employer] gives you secure access to your electronic health record. If you see a primary care provider, you can also send messages to your care team and make appointments. If you have questions, please call your primary care clinic.  If you do not have a primary care provider, please call [phone] and they will assist you.      [employer] is an electronic gateway that provides easy, online access to your medical " records. With [employer], you can request a clinic appointment, read your test results, renew a prescription or communicate with your care team.     To access your existing account, please contact your [facility] or call [phone] for assistance.        Care EveryWhere ID     This is your Care EveryWhere ID. This could be used by other organizations to access your [facility] medical records  [id number]           Review of your medicines      UNREVIEWED medicines. Ask your doctor about these medicines        Dose / Directions    amLODIPine 10 MG tablet   Commonly known as:  NORVASC   Used for:  HTN (hypertension)        Dose:  10 mg   Take 1 tablet (10 mg) by mouth daily   Quantity:  90 tablet   Refills:  3       lisinopril 5 MG tablet   Commonly known as:  PRINIVIL/ZESTRIL   Used for:  HTN (hypertension)        Dose:  5 mg   Take 1 tablet (5 mg) by mouth daily   Quantity:  90 tablet   Refills:  3       magnesium oxide 400 (241.3 MG) MG tablet   Commonly known as:  MAG-OX   Used for:  Hypomagnesemia        Dose:  800 mg   Take 2 tablets (800 mg) by mouth 2 times daily   Quantity:  120 tablet   Refills:  3       mycophenolate 250 MG capsule   Commonly known as:  CELLCEPT - GENERIC EQUIVALENT   Used for:  Liver replaced by transplant (H)        Dose:  250 mg   Take 1 capsule (250 mg) by mouth 2 times daily   Quantity:  60 capsule   Refills:  11       sulfamethoxazole-trimethoprim 400-80 MG per tablet   Commonly known as:  BACTRIM/SEPTRA   Indication:  PCP Prophylaxis   Used for:  Liver replaced by transplant (H), S/P kidney transplant        Dose:  1 tablet   Take 1 tablet by mouth daily   Quantity:  30 tablet   Refills:  11       tacrolimus capsule   Used for:  Liver replaced by transplant (H), S/P kidney transplant        Dose:  3 mg   Take 3 capsules (3 mg) by mouth 2 times daily   Quantity:  180 capsule   Refills:  11       traMADol 50 MG tablet   Commonly known as:  ULTRAM   Used  for:  Chronic pain of left knee        Dose:  50 mg   Take 1 tablet (50 mg) by mouth every 8 hours as needed for moderate pain   Quantity:  45 tablet   Refills:  0       vitamin D 2000 UNITS tablet   Used for:  Vitamin D deficiency        Dose:  2000 Units   Take 2,000 Units by mouth daily   Quantity:  100 tablet   Refills:  3                Protect others around you: Learn how to safely use, store and throw away your medicines at www.disposemymeds.org.             Medication List: This is a list of all your medications and when to take them. Check marks below indicate your daily home schedule. Keep this list as a reference.      Medications           Morning Afternoon Evening Bedtime As Needed    amLODIPine 10 MG tablet   Commonly known as:  NORVASC   Take 1 tablet (10 mg) by mouth daily                                lisinopril 5 MG tablet   Commonly known as:  PRINIVIL/ZESTRIL   Take 1 tablet (5 mg) by mouth daily                                magnesium oxide 400 (241.3 MG) MG tablet   Commonly known as:  MAG-OX   Take 2 tablets (800 mg) by mouth 2 times daily                                mycophenolate 250 MG capsule   Commonly known as:  CELLCEPT - GENERIC EQUIVALENT   Take 1 capsule (250 mg) by mouth 2 times daily                                sulfamethoxazole-trimethoprim 400-80 MG per tablet   Commonly known as:  BACTRIM/SEPTRA   Take 1 tablet by mouth daily                                tacrolimus capsule   Take 3 capsules (3 mg) by mouth 2 times daily                                traMADol 50 MG tablet   Commonly known as:  ULTRAM   Take 1 tablet (50 mg) by mouth every 8 hours as needed for moderate pain                                vitamin D 2000 UNITS tablet   Take 2,000 Units by mouth daily

## 2017-04-03 NOTE — PROCEDURES
Interventional Radiology Brief Post Procedure Note    Procedure: Chest port placement    Proceduralist: Catracho Jacques PA-C    Assistant: Abbie Epley, PA-S    Time Out: Prior to the start of the procedure and with procedural staff participation, I verbally confirmed the patient s identity using two indicators, relevant allergies, that the procedure was appropriate and matched the consent or emergent situation, and that the correct equipment/implants were available. Immediately prior to starting the procedure I conducted the Time Out with the procedural staff and re-confirmed the patient s name, procedure, and site/side. (The Joint Commission universal protocol was followed.)  Yes    Sedation: Monitored Anesthesia Care (MAC) administered and documented by Anesthesia Care Provider    Findings: Completed image-guided placement of 8 Bengali 21 cm single lumen power-injectable central venous port via right IJ. Aspirates and flushes freely, heparin locked and is ready for immediate use.    Estimated Blood Loss: Less than 10 ml    Fluoroscopy Time: 1.0 minutes    SPECIMENS: None    Complications: 1. None     Condition: Stable    Plan: Follow-up per primary team. Return for removal as indicated.    Comments: See dictated procedure note for full details.    Catracho Jacques PA-C

## 2017-04-03 NOTE — ANESTHESIA POSTPROCEDURE EVALUATION
Patient: Cricket Chen    Procedure(s):  Single Lumen Port Placement - Wound Class: I-Clean    Diagnosis:Post Transplant Lymphoproliferative Disorder  Diagnosis Additional Information: No value filed.    Anesthesia Type:  MAC    Note:  Anesthesia Post Evaluation    Patient location during evaluation: bedside  Patient participation: Able to fully participate in evaluation  Level of consciousness: awake  Pain management: adequate  Airway patency: patent  Cardiovascular status: acceptable  Respiratory status: acceptable  Hydration status: acceptable  PONV: controlled     Anesthetic complications: None          Last vitals:  Vitals:    04/03/17 1206 04/03/17 1438 04/03/17 1453   BP: 126/75 147/78 141/80   Pulse: 73     Resp: 18 20 18   Temp: 37.1  C (98.8  F) 36.9  C (98.4  F) 36.8  C (98.2  F)   SpO2: 99% 100%          Electronically Signed By: Malik Lyn MD, MD  April 3, 2017  4:06 PM

## 2017-04-03 NOTE — DISCHARGE INSTRUCTIONS
[facility]  Home Care Following Anesthesia  For 24 hours after surgery:  1. Get plenty of rest.  A responsible adult must stay with you for at least 24 hours after you leave the surgery center.  2. Do not drive or use heavy equipment.  If you have weakness or tingling, don't drive or use heavy equipment until this feeling goes away.   3. Do not drink alcohol.   4. Avoid strenuous or risky activities.  Ask for help when climbing stairs.  5. You may feel lightheaded.  IF so, sit for a few minutes before standing.  Have someone help you get up.   6. If you have nausea (feel sick to your stomach): Drink only clear liquids such as apple juice, ginger ale, broth or 7-Up.  Rest may also help.  Be sure to drink enough fluids.  Move to a regular diet as you feel able.   7. You may have a slight fever.  Call the doctor if your fever is over 100 F (37.7 C) (taken under the tongue) or lasts longer than 24 hours.  8. You may have a dry mouth, a sore throat, muscle aches or trouble sleeping. These should go away after 24 hours.  9. Do not make important or legal decisions.               Tips for taking pain medications  To get the best pain relief possible, remember these points:    Take pain medications as directed, before pain becomes severe.    Pain medication can upset your stomach: taking it with food may help.    Constipation is a common side effect of pain medication. Drink plenty of  fluids.    Eat foods high in fiber. Take a stool softener if recommended by your doctor or pharmacist.    Do not drink alcohol, drive or operate machinery while taking pain medications.    Ask about other ways to control pain, such as with heat, ice or relaxation.    Call a doctor for any of the followin. Signs of infection (fever, growing tenderness at the surgery site, a large amount of drainage or bleeding, severe pain, foul-smelling drainage, redness, swelling).  2. It has been over 8 to 10 hours  since surgery and you are still not able to urinate (pass water).  3. Headache for over 24 hours.  4. Numbness, tingling or weakness the day after surgery (if you had spinal anesthesia).  Your care provider,  Rajendra Jacques                         Or dial [phone] and ask for the resident on call for:  Interventional Radiology  For emergency care, call the:  [place] [facility]:  [phone] (TTY for hearing impaired: [phone])      A collaboration between [facility] and [facility]  Experts in minimally invasive, targeted treatments performed using imaging guidance    [facility]  Discharge Instructions For   Placement of Venous Access Device,  Port Catheter or Tunneled Central Line    Today you had a procedure today to install a venous access device; either a tunneled central vein catheter or a subcutaneous port catheter.    One of our Radiology PAs performed this procedure for you today:  ? [facility] [place], [place]ASHI  ? CLAUDIA Montoya PA-C  ? Catracho Jacques PA-C  ? Liz Harper MS, PA-C    After you go home:  - Drink plenty of fluids.  Generally 6-8 (8 ounce) glasses a day is recommended.  - Resume your regular diet unless otherwise ordered by a medical provider.  - Keep any applied tape/gauze dressings clean and dry.  Change tape/gauze dressings if they get wet or soiled.  - You may shower the following day after procedure, however cover and protect from moisture any tape/gauze dressings.  You may let water hit and run over dried skin glue, but do not scrub.  Pat the area dry after showering.  - Port placement incisions are closed with absorbable suture, meaning they do not need to be removed at a later date, and a topical skin adhesive (skin glue).  This glue will wear off in 7-14 days.  Do not remove before this time.  If 14 days have passed and residual glue is present, you may gently remove it.  - Do not apply  gels, lotions, or ointments to the glue site for the first 10 days as this may cause the glue to prematurely soften and fail.  - Do not perform strenuous activities or lift greater than 10 pounds for the next three days.  - If there is bleeding or oozing from the procedure site, apply firm pressure to the area for 5-10 minutes.  If the bleeding continues seek medical advice at the numbers below.  - Mild procedure site discomfort can be treated with an ice pack and over-the-counter pain relievers.        For 24 hours after any sedation used:  - Relax and take it easy.  No strenuous activities.  - Do not drive or operate machines at home or at work.  - No alcohol consumption.  - Do not make any important or legal decisions.    Call our Interventional Radiology (IR) service if:  - If you start bleeding from the procedure site.  If you do start to bleed from the site, lie down and hold some pressure on the site.  Our radiology provider can help you decide if you need to return to the hospital.  - If you have new or worsening pain related to the procedure.  - If you have concerning swelling at the procedure site.  - If you develop persistent nausea or vomiting.  - If you develop hives or a rash or any unexplained itching.  - If you have a fever of greater than 100.5  F and chills in the first 5 days after procedure.  - Any other concerns related to your procedure.      [facility]  Interventional Radiology (IR)  [address] [place] [address]    Contact Number:  [phone]  (IR control desk)  - Monday - Friday 8:00 am - 4:30 pm    After hours for urgent concerns:  [phone]  - After 4:30 pm Monday - Friday, Weekends and Holidays.   - Ask for Interventional Radiology on-call.  Someone is available 24 hours a day.  - [facility] toll free number:  [phone]

## 2017-04-03 NOTE — IP AVS SNAPSHOT
[facility]    [address] [place] [address]    Phone:  [phone]    Fax:  [phone]                                       After Visit Summary   4/3/2017    Cricket Chen    MRN: [id number]           After Visit Summary Signature Page     I have received my discharge instructions, and my questions have been answered. I have discussed any challenges I see with this plan with the nurse or doctor.    ..........................................................................................................................................  Patient/Patient Representative Signature      ..........................................................................................................................................  Patient Representative Print Name and Relationship to Patient    ..................................................               ................................................  Date                                            Time    ..........................................................................................................................................  Reviewed by Signature/Title    ...................................................              ..............................................  Date                                                            Time

## 2017-04-03 NOTE — ANESTHESIA PREPROCEDURE EVALUATION
Anesthesia Evaluation     . Pt has had prior anesthetic. Type: General    No history of anesthetic complications          ROS/MED HX    ENT/Pulmonary:  - neg pulmonary ROS     Neurologic:  - neg neurologic ROS     Cardiovascular:     (+) hypertension----. : . . . :. dysrhythmias a-flutter, .       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),       Renal/Genitourinary:     (+) chronic renal disease, Pt has history of transplant,       Endo:  - neg endo ROS       Psychiatric:     (+) psychiatric history anxiety      Infectious Disease:  - neg infectious disease ROS       Malignancy:      - no malignancy   Other:                     Physical Exam  Normal systems: dental    Airway   Mallampati: I  TM distance: >3 FB  Neck ROM: full    Dental     Cardiovascular   Rhythm and rate: regular and normal      Pulmonary    breath sounds clear to auscultation                    Anesthesia Plan      History & Physical Review  History and physical reviewed and following examination; no interval change.    ASA Status:  3 .    NPO Status:  > 6 hours    Plan for MAC with Intravenous induction. Maintenance will be TIVA.  Reason for MAC:  Deep or markedly invasive procedure (G8)  PONV prophylaxis:  Ondansetron (or other 5HT-3) and Dexamethasone or Solumedrol       Postoperative Care  Postoperative pain management:  Oral pain medications and Multi-modal analgesia.      Consents  Anesthetic plan, risks, benefits and alternatives discussed with:  Patient..                          .

## 2017-04-04 PROBLEM — Z51.89 ENCOUNTER FOR OTHER SPECIFIED AFTERCARE: Status: ACTIVE | Noted: 2017-04-04

## 2017-04-04 RX ORDER — PROCHLORPERAZINE MALEATE 10 MG
10 TABLET ORAL EVERY 6 HOURS PRN
Qty: 30 TABLET | Refills: 5 | Status: CANCELLED | OUTPATIENT
Start: 2017-04-05

## 2017-04-04 RX ORDER — LORAZEPAM 0.5 MG/1
0.5 TABLET ORAL EVERY 4 HOURS PRN
Qty: 30 TABLET | Refills: 5 | Status: SHIPPED | OUTPATIENT
Start: 2017-04-04 | End: 2017-07-20

## 2017-04-04 RX ORDER — PROCHLORPERAZINE MALEATE 10 MG
10 TABLET ORAL EVERY 6 HOURS PRN
Qty: 30 TABLET | Refills: 5 | Status: SHIPPED | OUTPATIENT
Start: 2017-04-04 | End: 2017-07-20

## 2017-04-04 RX ORDER — LORAZEPAM 0.5 MG/1
0.5 TABLET ORAL EVERY 4 HOURS PRN
Qty: 30 TABLET | Refills: 5 | Status: CANCELLED | OUTPATIENT
Start: 2017-04-05

## 2017-04-05 ENCOUNTER — INFUSION THERAPY VISIT (OUTPATIENT)
Dept: ONCOLOGY | Facility: CLINIC | Age: 37
End: 2017-04-05
Attending: INTERNAL MEDICINE
Payer: COMMERCIAL

## 2017-04-05 ENCOUNTER — TELEPHONE (OUTPATIENT)
Dept: TRANSPLANT | Facility: CLINIC | Age: 37
End: 2017-04-05

## 2017-04-05 ENCOUNTER — APPOINTMENT (OUTPATIENT)
Dept: LAB | Facility: CLINIC | Age: 37
End: 2017-04-05
Attending: INTERNAL MEDICINE
Payer: COMMERCIAL

## 2017-04-05 VITALS
TEMPERATURE: 98.2 F | DIASTOLIC BLOOD PRESSURE: 85 MMHG | BODY MASS INDEX: 29.62 KG/M2 | RESPIRATION RATE: 16 BRPM | WEIGHT: 206.4 LBS | HEART RATE: 106 BPM | OXYGEN SATURATION: 100 % | SYSTOLIC BLOOD PRESSURE: 131 MMHG

## 2017-04-05 DIAGNOSIS — Z94.4 LIVER REPLACED BY TRANSPLANT (H): ICD-10-CM

## 2017-04-05 DIAGNOSIS — D47.Z1 POST-TRANSPLANT LYMPHOPROLIFERATIVE DISORDER (H): Primary | ICD-10-CM

## 2017-04-05 DIAGNOSIS — Z94.0 S/P KIDNEY TRANSPLANT: ICD-10-CM

## 2017-04-05 DIAGNOSIS — Z51.89 ENCOUNTER FOR OTHER SPECIFIED AFTERCARE: ICD-10-CM

## 2017-04-05 LAB
ALBUMIN SERPL-MCNC: 4 G/DL (ref 3.4–5)
ALP SERPL-CCNC: 74 U/L (ref 40–150)
ALT SERPL W P-5'-P-CCNC: 17 U/L (ref 0–70)
ANION GAP SERPL CALCULATED.3IONS-SCNC: 8 MMOL/L (ref 3–14)
AST SERPL W P-5'-P-CCNC: 14 U/L (ref 0–45)
BASOPHILS # BLD AUTO: 0 10E9/L (ref 0–0.2)
BASOPHILS NFR BLD AUTO: 0.3 %
BILIRUB DIRECT SERPL-MCNC: 0.1 MG/DL (ref 0–0.2)
BILIRUB SERPL-MCNC: 0.6 MG/DL (ref 0.2–1.3)
BUN SERPL-MCNC: 17 MG/DL (ref 7–30)
CALCIUM SERPL-MCNC: 8.5 MG/DL (ref 8.5–10.1)
CHLORIDE SERPL-SCNC: 110 MMOL/L (ref 94–109)
CO2 SERPL-SCNC: 25 MMOL/L (ref 20–32)
CREAT SERPL-MCNC: 1.23 MG/DL (ref 0.66–1.25)
DIFFERENTIAL METHOD BLD: ABNORMAL
EOSINOPHIL # BLD AUTO: 0 10E9/L (ref 0–0.7)
EOSINOPHIL NFR BLD AUTO: 0.8 %
ERYTHROCYTE [DISTWIDTH] IN BLOOD BY AUTOMATED COUNT: 13.1 % (ref 10–15)
GFR SERPL CREATININE-BSD FRML MDRD: 66 ML/MIN/1.7M2
GLUCOSE SERPL-MCNC: 100 MG/DL (ref 70–99)
HCT VFR BLD AUTO: 39.5 % (ref 40–53)
HGB BLD-MCNC: 13.3 G/DL (ref 13.3–17.7)
IMM GRANULOCYTES # BLD: 0 10E9/L (ref 0–0.4)
IMM GRANULOCYTES NFR BLD: 0.3 %
LYMPHOCYTES # BLD AUTO: 0.5 10E9/L (ref 0.8–5.3)
LYMPHOCYTES NFR BLD AUTO: 14 %
MAGNESIUM SERPL-MCNC: 2 MG/DL (ref 1.6–2.3)
MCH RBC QN AUTO: 29.8 PG (ref 26.5–33)
MCHC RBC AUTO-ENTMCNC: 33.7 G/DL (ref 31.5–36.5)
MCV RBC AUTO: 89 FL (ref 78–100)
MONOCYTES # BLD AUTO: 0.4 10E9/L (ref 0–1.3)
MONOCYTES NFR BLD AUTO: 10.9 %
NEUTROPHILS # BLD AUTO: 2.6 10E9/L (ref 1.6–8.3)
NEUTROPHILS NFR BLD AUTO: 73.7 %
NRBC # BLD AUTO: 0 10*3/UL
NRBC BLD AUTO-RTO: 0 /100
PHOSPHATE SERPL-MCNC: 3.2 MG/DL (ref 2.5–4.5)
PLATELET # BLD AUTO: 93 10E9/L (ref 150–450)
POTASSIUM SERPL-SCNC: 3.8 MMOL/L (ref 3.4–5.3)
PROT SERPL-MCNC: 6.7 G/DL (ref 6.8–8.8)
RBC # BLD AUTO: 4.46 10E12/L (ref 4.4–5.9)
SODIUM SERPL-SCNC: 143 MMOL/L (ref 133–144)
TACROLIMUS BLD-MCNC: 8 UG/L (ref 5–15)
TME LAST DOSE: NORMAL H
WBC # BLD AUTO: 3.6 10E9/L (ref 4–11)

## 2017-04-05 PROCEDURE — 96367 TX/PROPH/DG ADDL SEQ IV INF: CPT

## 2017-04-05 PROCEDURE — 96375 TX/PRO/DX INJ NEW DRUG ADDON: CPT

## 2017-04-05 PROCEDURE — 84100 ASSAY OF PHOSPHORUS: CPT | Performed by: INTERNAL MEDICINE

## 2017-04-05 PROCEDURE — 96415 CHEMO IV INFUSION ADDL HR: CPT

## 2017-04-05 PROCEDURE — 82248 BILIRUBIN DIRECT: CPT | Performed by: INTERNAL MEDICINE

## 2017-04-05 PROCEDURE — 96411 CHEMO IV PUSH ADDL DRUG: CPT

## 2017-04-05 PROCEDURE — 96368 THER/DIAG CONCURRENT INF: CPT

## 2017-04-05 PROCEDURE — 96417 CHEMO IV INFUS EACH ADDL SEQ: CPT

## 2017-04-05 PROCEDURE — 80197 ASSAY OF TACROLIMUS: CPT | Performed by: INTERNAL MEDICINE

## 2017-04-05 PROCEDURE — 85025 COMPLETE CBC W/AUTO DIFF WBC: CPT | Performed by: INTERNAL MEDICINE

## 2017-04-05 PROCEDURE — 83735 ASSAY OF MAGNESIUM: CPT | Performed by: INTERNAL MEDICINE

## 2017-04-05 PROCEDURE — 96413 CHEMO IV INFUSION 1 HR: CPT

## 2017-04-05 PROCEDURE — 80053 COMPREHEN METABOLIC PANEL: CPT | Performed by: INTERNAL MEDICINE

## 2017-04-05 PROCEDURE — 25000128 H RX IP 250 OP 636: Mod: ZF | Performed by: INTERNAL MEDICINE

## 2017-04-05 PROCEDURE — 25000125 ZZHC RX 250: Mod: ZF | Performed by: INTERNAL MEDICINE

## 2017-04-05 PROCEDURE — 25000132 ZZH RX MED GY IP 250 OP 250 PS 637: Mod: ZF | Performed by: INTERNAL MEDICINE

## 2017-04-05 RX ORDER — ACETAMINOPHEN 325 MG/1
650 TABLET ORAL ONCE
Status: COMPLETED | OUTPATIENT
Start: 2017-04-05 | End: 2017-04-05

## 2017-04-05 RX ORDER — HEPARIN SODIUM (PORCINE) LOCK FLUSH IV SOLN 100 UNIT/ML 100 UNIT/ML
5 SOLUTION INTRAVENOUS EVERY 8 HOURS
Status: DISCONTINUED | OUTPATIENT
Start: 2017-04-05 | End: 2017-04-05 | Stop reason: HOSPADM

## 2017-04-05 RX ORDER — TACROLIMUS 1 MG/1
1 CAPSULE, GELATIN COATED ORAL 2 TIMES DAILY
Qty: 14 CAPSULE | Refills: 0 | Status: SHIPPED | OUTPATIENT
Start: 2017-04-05 | End: 2017-04-26

## 2017-04-05 RX ORDER — PREDNISONE 10 MG/1
10 TABLET ORAL DAILY
Qty: 21 TABLET | Refills: 3 | Status: SHIPPED | OUTPATIENT
Start: 2017-04-11 | End: 2017-06-16

## 2017-04-05 RX ORDER — ETOPOSIDE 50 MG/1
100 CAPSULE ORAL DAILY
Qty: 8 CAPSULE | Refills: 0 | Status: SHIPPED | OUTPATIENT
Start: 2017-04-06 | End: 2017-04-26

## 2017-04-05 RX ORDER — PALONOSETRON 0.05 MG/ML
0.25 INJECTION, SOLUTION INTRAVENOUS ONCE
Status: COMPLETED | OUTPATIENT
Start: 2017-04-05 | End: 2017-04-05

## 2017-04-05 RX ORDER — PREDNISONE 50 MG/1
50 TABLET ORAL 2 TIMES DAILY
Qty: 10 TABLET | Refills: 0 | Status: SHIPPED | OUTPATIENT
Start: 2017-04-05 | End: 2017-04-10

## 2017-04-05 RX ORDER — HEPARIN SODIUM (PORCINE) LOCK FLUSH IV SOLN 100 UNIT/ML 100 UNIT/ML
5 SOLUTION INTRAVENOUS ONCE
Status: COMPLETED | OUTPATIENT
Start: 2017-04-05 | End: 2017-04-05

## 2017-04-05 RX ADMIN — SODIUM CHLORIDE 500 ML: 9 INJECTION, SOLUTION INTRAVENOUS at 07:20

## 2017-04-05 RX ADMIN — CYCLOPHOSPHAMIDE 1650 MG: 2 INJECTION, POWDER, FOR SOLUTION INTRAVENOUS; ORAL at 08:08

## 2017-04-05 RX ADMIN — SODIUM CHLORIDE, PRESERVATIVE FREE 5 ML: 5 INJECTION INTRAVENOUS at 06:57

## 2017-04-05 RX ADMIN — ETOPOSIDE 110 MG: 20 INJECTION, SOLUTION, CONCENTRATE INTRAVENOUS at 09:05

## 2017-04-05 RX ADMIN — PALONOSETRON HYDROCHLORIDE 0.25 MG: 0.25 INJECTION INTRAVENOUS at 07:30

## 2017-04-05 RX ADMIN — RITUXIMAB 800 MG: 10 INJECTION, SOLUTION INTRAVENOUS at 10:14

## 2017-04-05 RX ADMIN — SODIUM CHLORIDE 150 MG: 9 INJECTION, SOLUTION INTRAVENOUS at 07:32

## 2017-04-05 RX ADMIN — DIPHENHYDRAMINE HYDROCHLORIDE 50 MG: 50 INJECTION, SOLUTION INTRAMUSCULAR; INTRAVENOUS at 09:22

## 2017-04-05 RX ADMIN — ACETAMINOPHEN 650 MG: 325 TABLET ORAL at 09:22

## 2017-04-05 RX ADMIN — SODIUM CHLORIDE, PRESERVATIVE FREE 5 ML: 5 INJECTION INTRAVENOUS at 12:57

## 2017-04-05 RX ADMIN — VINCRISTINE SULFATE 2 MG: 1 INJECTION, SOLUTION INTRAVENOUS at 08:02

## 2017-04-05 NOTE — PATIENT INSTRUCTIONS
Contact Numbers    [facility] Main Line: [phone]  [facility] Triage:  [phone]    Call triage with chills and/or temperature greater than or equal to 100.5, uncontrolled nausea/vomiting, diarrhea, constipation, dizziness, shortness of breath, chest pain, bleeding, unexplained bruising, or any new/concerning symptoms, questions/concerns.     If you are having any concerning symptoms or wish to speak to a provider before your next infusion visit, please call your care coordinator or triage to notify them so we can adequately serve you.           April 2017 Sunday Monday Tuesday Wednesday Thursday Friday Saturday                                 1       2     3     Outpatient Visit   11:46 AM   [facility]     IR CHEST PORT PLACEMENT >5 YRS   12:00 PM   (60 min.)   [id number]   [facility]     INSERT PORT VASCULAR ACCESS    1:30 PM   Rajendra Jacques PA-C    OR 4     5     [facility]    6:45 AM   (15 min.)    [facility]   [facility] Draw     P ONC INFUSION 360    7:00 AM   (360 min.)    ONCOLOGY INFUSION   [facility] 6     7     8     [facility] ONC INFUSION 30    9:00 AM   (30 min.)    ONCOLOGY INFUSION   [facility]   9     10     11     12     [facility]   11:15 AM   (15 min.)    [facility]   [facility] 13     14     15       16     17     18     [facility]    9:45 AM   (15 min.)    [facility]   [facility]     [facility] RETURN KIDNEY TRANSPLANT   10:00 AM   (25 min.)   Jake Sidhu MD   [facility] 19     20     21     22       23     24     25     26     [facility] D[facility]    9:45 AM   (15 min.)    [facility] DRAW   [facility] Draw     [facility] RETURN   10:05 AM   (50 min.)   Krystina Durham PA-C   [facility]     [facility] ONC INFUSION 360   11:30 AM   (360 min.)    ONCOLOGY INFUSION   [facility] 27     28     29       30                                              May 2017   Andre Monday Tuesday Wednesday Thursday Friday Saturday        1     2     3     4     5     6       7     8     9     10     11     12     13       14     15     16     17     18     19     20       21     22     23     24     25     26     27       28 29 30 31                                 Lab Results:  Recent Results (from the past 12 hour(s))   CBC with platelets differential    Collection Time: 04/05/17  6:54 AM   Result Value Ref Range    WBC 3.6 (L) 4.0 - 11.0 10e9/L    RBC Count 4.46 4.4 - 5.9 10e12/L    Hemoglobin 13.3 13.3 - 17.7 g/dL    Hematocrit 39.5 (L) 40.0 - 53.0 %    MCV 89 78 - 100 fl    MCH 29.8 26.5 - 33.0 pg    MCHC 33.7 31.5 - 36.5 g/dL    RDW 13.1 10.0 - 15.0 %    Platelet Count 93 (L) 150 - 450 10e9/L    Diff Method Automated Method     % Neutrophils 73.7 %    % Lymphocytes 14.0 %    % Monocytes 10.9 %    % Eosinophils 0.8 %    % Basophils 0.3 %    % Immature Granulocytes 0.3 %    Nucleated RBCs 0 0 /100    Absolute Neutrophil 2.6 1.6 - 8.3 10e9/L    Absolute Lymphocytes 0.5 (L) 0.8 - 5.3 10e9/L    Absolute Monocytes 0.4 0.0 - 1.3 10e9/L    Absolute Eosinophils 0.0 0.0 - 0.7 10e9/L    Absolute Basophils 0.0 0.0 - 0.2 10e9/L    Abs Immature Granulocytes 0.0 0 - 0.4 10e9/L    Absolute Nucleated RBC 0.0    Comprehensive metabolic panel    Collection Time: 04/05/17  6:54 AM   Result Value Ref Range    Sodium 143 133 - 144 mmol/L    Potassium 3.8 3.4 - 5.3 mmol/L    Chloride 110 (H) 94 - 109 mmol/L    Carbon Dioxide 25 20 - 32 mmol/L    Anion Gap 8 3 - 14 mmol/L    Glucose 100 (H) 70 - 99 mg/dL    Urea Nitrogen 17 7 - 30 mg/dL    Creatinine 1.23 0.66 - 1.25 mg/dL    GFR Estimate 66 >60 mL/min/1.7m2    GFR Estimate If Black 80 >60 mL/min/1.7m2    Calcium 8.5 8.5 - 10.1 mg/dL    Bilirubin Total 0.6 0.2 - 1.3 mg/dL    Albumin 4.0 3.4 - 5.0 g/dL    Protein Total 6.7 (L) 6.8 - 8.8 g/dL     Alkaline Phosphatase 74 40 - 150 U/L    ALT 17 0 - 70 U/L    AST 14 0 - 45 U/L   Magnesium    Collection Time: 04/05/17  6:54 AM   Result Value Ref Range    Magnesium 2.0 1.6 - 2.3 mg/dL   Phosphorus    Collection Time: 04/05/17  6:54 AM   Result Value Ref Range    Phosphorus 3.2 2.5 - 4.5 mg/dL   Bilirubin direct    Collection Time: 04/05/17  6:54 AM   Result Value Ref Range    Bilirubin Direct 0.1 0.0 - 0.2 mg/dL

## 2017-04-05 NOTE — MR AVS SNAPSHOT
After Visit Summary   4/5/2017    Cricket Chen    MRN: [id number]           Patient Information     Date Of Birth          1980        Visit Information        Provider Department      4/5/2017 7:00 AM  10 ATC;  ONCOLOGY INFUSION [facility]        Today's Diagnoses     Post-transplant lymphoproliferative disorder (H)    -  1    Liver replaced by transplant (H)        Encounter for other specified aftercare          Care Instructions    Contact Numbers    [facility] Main Line: [phone]  [facility] Triage:  [phone]    Call triage with chills and/or temperature greater than or equal to 100.5, uncontrolled nausea/vomiting, diarrhea, constipation, dizziness, shortness of breath, chest pain, bleeding, unexplained bruising, or any new/concerning symptoms, questions/concerns.     If you are having any concerning symptoms or wish to speak to a provider before your next infusion visit, please call your care coordinator or triage to notify them so we can adequately serve you.           April 2017 Sunday Monday Tuesday Wednesday Thursday Friday Saturday                                 1       2     3     Outpatient Visit   11:46 AM   [facility]     IR CHEST PORT PLACEMENT >5 YRS   12:00 PM   (60 min.)   [id number]   [facility]     INSERT PORT VASCULAR ACCESS    1:30 PM   Rajendra Jacques PA-C    OR 4     5     [facility] D[facility]    6:45 AM   (15 min.)    [facility]     [facility] ONC INFUSION 360    7:00 AM   (360 min.)    ONCOLOGY INFUSION   [facility] 6     7     8     [facility] ONC INFUSION 30    9:00 AM   (30 min.)    ONCOLOGY INFUSION   [facility]   9     10     11     12     [facility] DRAW   11:15 AM   (15 min.)    [facility] DRAW   [facility] Draw 13     14     15       16     17     18     [facility] D[facility]    9:45 AM   (15 min.)    [facility] LAB  DRAW   [facility] Lab Draw     [facility] RETURN KIDNEY TRANSPLANT   10:00 AM   (25 min.)   Jake Sidhu MD   [facility]phrology 19     20     21     22       23     24     25     26     [facility]    9:45 AM   (15 min.)   [facility]   [facility] Lab Draw     [facility] RETURN   10:05 AM   (50 min.)   Krystina Durham PA-C   [facility] ONC INFUSION 360   11:30 AM   (360 min.)    ONCOLOGY INFUSION   [facility] 27     28     29       30                                              May 2017   Andre Monday Tuesday Wednesday Thursday Friday Saturday        1     2     3     4     5     6       7     8     9     10     11     12     13       14     15     16     17     18     19     20       21     22     23     24     25     26     27       28     29     30     31                                 Lab Results:  Recent Results (from the past 12 hour(s))   CBC with platelets differential    Collection Time: 04/05/17  6:54 AM   Result Value Ref Range    WBC 3.6 (L) 4.0 - 11.0 10e9/L    RBC Count 4.46 4.4 - 5.9 10e12/L    Hemoglobin 13.3 13.3 - 17.7 g/dL    Hematocrit 39.5 (L) 40.0 - 53.0 %    MCV 89 78 - 100 fl    MCH 29.8 26.5 - 33.0 pg    MCHC 33.7 31.5 - 36.5 g/dL    RDW 13.1 10.0 - 15.0 %    Platelet Count 93 (L) 150 - 450 10e9/L    Diff Method Automated Method     % Neutrophils 73.7 %    % Lymphocytes 14.0 %    % Monocytes 10.9 %    % Eosinophils 0.8 %    % Basophils 0.3 %    % Immature Granulocytes 0.3 %    Nucleated RBCs 0 0 /100    Absolute Neutrophil 2.6 1.6 - 8.3 10e9/L    Absolute Lymphocytes 0.5 (L) 0.8 - 5.3 10e9/L    Absolute Monocytes 0.4 0.0 - 1.3 10e9/L    Absolute Eosinophils 0.0 0.0 - 0.7 10e9/L    Absolute Basophils 0.0 0.0 - 0.2 10e9/L    Abs Immature Granulocytes 0.0 0 - 0.4 10e9/L    Absolute Nucleated RBC 0.0    Comprehensive metabolic panel    Collection Time: 04/05/17  6:54 AM   Result Value Ref Range    Sodium 143 133  - 144 mmol/L    Potassium 3.8 3.4 - 5.3 mmol/L    Chloride 110 (H) 94 - 109 mmol/L    Carbon Dioxide 25 20 - 32 mmol/L    Anion Gap 8 3 - 14 mmol/L    Glucose 100 (H) 70 - 99 mg/dL    Urea Nitrogen 17 7 - 30 mg/dL    Creatinine 1.23 0.66 - 1.25 mg/dL    GFR Estimate 66 >60 mL/min/1.7m2    GFR Estimate If Black 80 >60 mL/min/1.7m2    Calcium 8.5 8.5 - 10.1 mg/dL    Bilirubin Total 0.6 0.2 - 1.3 mg/dL    Albumin 4.0 3.4 - 5.0 g/dL    Protein Total 6.7 (L) 6.8 - 8.8 g/dL    Alkaline Phosphatase 74 40 - 150 U/L    ALT 17 0 - 70 U/L    AST 14 0 - 45 U/L   Magnesium    Collection Time: 04/05/17  6:54 AM   Result Value Ref Range    Magnesium 2.0 1.6 - 2.3 mg/dL   Phosphorus    Collection Time: 04/05/17  6:54 AM   Result Value Ref Range    Phosphorus 3.2 2.5 - 4.5 mg/dL   Bilirubin direct    Collection Time: 04/05/17  6:54 AM   Result Value Ref Range    Bilirubin Direct 0.1 0.0 - 0.2 mg/dL             Follow-ups after your visit        Your next 10 appointments already scheduled     Apr 08, 2017  9:00 AM CDT   Infusion 30 with UC ONCOLOGY INFUSION, UC 11 ATC   [facility] ([facility])    [address] [place] [id number] [phone]            Apr 12, 2017 11:15 AM CDT   [facility] Lab Draw with  [facility] LAB DRAW   [facility] Lab Draw ([facility])    [address] [place] [id number] [phone]            Apr 18, 2017  9:45 AM CDT   [facility] Lab Draw with  [facility] LAB DRAW   [facility]    [address] [place] [id number]   [phone]            Apr 18, 2017 10:30 AM CDT   (Arrive by 10:00 AM)   Return Kidney Transplant with Jake Sidhu MD   [facility] Nephrology ([facility])    [address] [facility] [address]   [phone]            Apr 26, 2017  9:45 AM  CDT   [facility]ic Lab Draw with  MASONIC LAB DRAW   [facility] Lab Draw ([facility])    [address]  2nd [address]  [facility] [id number] [phone]            Apr 26, 2017 10:20 AM CDT   (Arrive by 10:05 AM)   Return Visit with Krystina uDrham PA-C   [facility] ([facility])    [address]  2nd [address]  [facility] [id number] [phone]            Apr 26, 2017 11:30 AM CDT   Infusion 360 with  ONCOLOGY INFUSION, UC 13 ATC   [facility] ([facility])    [address]  2nd Floor  [facility] [id number] [phone]              Who to contact     If you have questions or need follow up information about today's clinic visit or your schedule please contact [employer] [facility] directly at [phone].  Normal or non-critical lab and imaging results will be communicated to you by [employer], letter or phone within 4 business days after the clinic has received the results. If you do not hear from us within 7 days, please contact the clinic through "[employer]"t or phone. If you have a critical or abnormal lab result, we will notify you by phone as soon as possible.  Submit refill requests through [employer] or call your pharmacy and they will forward the refill request to us. Please allow 3 business days for your refill to be completed.          Additional Information About Your Visit        [employer] Information     [employer] gives you secure access to your electronic health record. If you see a primary care provider, you can also send messages to your care team and make appointments. If you have questions, please call your primary care clinic.  If you do not have a primary care provider, please call [phone] and they will assist you.        Care EveryWhere ID     This is your Care EveryWhere ID. This could be used by other organizations to access your [place]  medical records  [id number]        Your Vitals Were     Pulse Temperature Respirations Pulse Oximetry BMI (Body Mass Index)       106 98.2  F (36.8  C) 16 100% 29.62 kg/m2        Blood Pressure from Last 3 Encounters:   04/05/17 131/85   04/03/17 141/80   03/30/17 127/68    Weight from Last 3 Encounters:   04/05/17 93.6 kg (206 lb 6.4 oz)   04/03/17 90.7 kg (200 lb)   03/30/17 93.6 kg (206 lb 5.6 oz)              We Performed the Following     Bilirubin direct     CBC with platelets differential     Comprehensive metabolic panel     Magnesium     Phosphorus     Tacrolimus level     Treatment Conditions          Today's Medication Changes          These changes are accurate as of: 4/5/17 11:54 AM.  If you have any questions, ask your nurse or doctor.               Start taking these medicines.        Dose/Directions    etoposide 50 MG capsule CHEMO   Used for:  Post-transplant lymphoproliferative disorder (H), Encounter for other specified aftercare        Dose:  100 mg/m2/day   Start taking on:  4/6/2017   Take 4 capsules (200 mg) by mouth daily for 2 days Day 2 and 3.   Quantity:  8 capsule   Refills:  0       LORazepam 0.5 MG tablet   Commonly known as:  ATIVAN   Used for:  Post-transplant lymphoproliferative disorder (H)        Dose:  0.5 mg   Take 1 tablet (0.5 mg) by mouth every 4 hours as needed (Anxiety, Nausea/Vomiting or Sleep)   Quantity:  30 tablet   Refills:  5       * predniSONE 50 MG tablet   Commonly known as:  DELTASONE   Used for:  Post-transplant lymphoproliferative disorder (H), Encounter for other specified aftercare        Dose:  50 mg   Take 1 tablet (50 mg) by mouth 2 times daily for 5 days Days 1 through 5.   Quantity:  10 tablet   Refills:  0       * predniSONE 10 MG tablet   Commonly known as:  DELTASONE   Used for:  Liver replaced by transplant (H), S/P kidney transplant   Started by:  Pam Egan RN        Dose:  10 mg   Start taking on:  4/11/2017   Take 1 tablet (10 mg) by  mouth daily   Quantity:  21 tablet   Refills:  3       prochlorperazine 10 MG tablet   Commonly known as:  COMPAZINE   Used for:  Post-transplant lymphoproliferative disorder (H)        Dose:  10 mg   Take 1 tablet (10 mg) by mouth every 6 hours as needed (Nausea/Vomiting)   Quantity:  30 tablet   Refills:  5       * Notice:  This list has 2 medication(s) that are the same as other medications prescribed for you. Read the directions carefully, and ask your doctor or other care provider to review them with you.      These medicines have changed or have updated prescriptions.        Dose/Directions    tacrolimus capsule   This may have changed:  how much to take   Used for:  Liver replaced by transplant (H), S/P kidney transplant   Changed by:  Pam Egan RN        Dose:  1 mg   Take 1 capsule (1 mg) by mouth 2 times daily for 7 days   Quantity:  14 capsule   Refills:  0            Where to get your medicines      These medications were sent to [facility] - [place], [place] - [address] [place] [address]  [address] [facility] [facility] [address]    Hours:  Mon-Fri 8:30am-5:00pm Toll Free [phone] Phone:  [phone]     predniSONE 10 MG tablet    tacrolimus capsule         These medications were sent to [facility] - [place], [place] - [address]  [address] [address]    Hours:  TRANSPLANT PHONE NUMBER [phone] Phone:  [phone]     etoposide 50 MG capsule CHEMO    predniSONE 50 MG tablet    prochlorperazine 10 MG tablet         Some of these will need a paper prescription and others can be bought over the counter.  Ask your nurse if you have questions.     Bring a paper prescription for each of these medications     LORazepam 0.5 MG tablet                Primary Care Provider Office Phone # Fax #    Eugenia Perez [phone]       [address]  [place] [address]        Thank you!     Thank you for choosing [facility]  for your care. Our goal is always to provide you with excellent care. Hearing back from our patients is one way we can continue to improve our services. Please take a few minutes to complete the written survey that you may receive in the mail after your visit with us. Thank you!             Your Updated Medication List - Protect others around you: Learn how to safely use, store and throw away your medicines at www.disposemymeds.org.          This list is accurate as of: 4/5/17 11:54 AM.  Always use your most recent med list.                   Brand Name Dispense Instructions for use    amLODIPine 10 MG tablet    NORVASC    90 tablet    Take 1 tablet (10 mg) by mouth daily       etoposide 50 MG capsule CHEMO   Start taking on:  4/6/2017     8 capsule    Take 4 capsules (200 mg) by mouth daily for 2 days Day 2 and 3.       lisinopril 5 MG tablet    PRINIVIL/ZESTRIL    90 tablet    Take 1 tablet (5 mg) by mouth daily       LORazepam 0.5 MG tablet    ATIVAN    30 tablet    Take 1 tablet (0.5 mg) by mouth every 4 hours as needed (Anxiety, Nausea/Vomiting or Sleep)       magnesium oxide 400 (241.3 MG) MG tablet    MAG-OX    120 tablet    Take 2 tablets (800 mg) by mouth 2 times daily       * predniSONE 50 MG tablet    DELTASONE    10 tablet    Take 1 tablet (50 mg) by mouth 2 times daily for 5 days Days 1 through 5.       * predniSONE 10 MG tablet   Start taking on:  4/11/2017    DELTASONE    21 tablet    Take 1 tablet (10 mg) by mouth daily       prochlorperazine 10 MG tablet    COMPAZINE    30 tablet    Take 1 tablet (10 mg) by mouth every 6 hours as needed (Nausea/Vomiting)       sulfamethoxazole-trimethoprim 400-80 MG per tablet    BACTRIM/SEPTRA    30 tablet    Take 1 tablet by mouth daily       tacrolimus capsule     14 capsule    Take 1 capsule (1 mg) by mouth 2 times daily for 7 days       traMADol 50 MG tablet    ULTRAM    45  tablet    Take 1 tablet (50 mg) by mouth every 8 hours as needed for moderate pain       vitamin D 2000 UNITS tablet     100 tablet    Take 2,000 Units by mouth daily       * Notice:  This list has 2 medication(s) that are the same as other medications prescribed for you. Read the directions carefully, and ask your doctor or other care provider to review them with you.

## 2017-04-05 NOTE — PROGRESS NOTES
Infusion Nursing Note:  Cricket Chen presents today for Cycle 1 Day 1 Rituxan, Vincristine, Cytoxan and Etoposide.    Patient seen by provider today: No   present during visit today: Not Applicable.    Note: Pt stated to RN that he was told by Dr. Narayanan that his Day 2 and 3 Etoposide would be given IV. Written orders in the treatment plan are for Oral Etoposide. Per pharmacy, there is a zero dollar copay for his oral Etoposide. Dr. Agarwal paged by JOSE Hernandez to clarify orders as she is covering for Dr. Narayanan.     TORB Dr. Narayanan/JOSE Hernandez/Shannan Smith RN Pt will receive Days 2 and 3 of Etoposide orally.     Intravenous Access:  Implanted Port.    Treatment Conditions:  Lab Results   Component Value Date    HGB 13.3 04/05/2017     Lab Results   Component Value Date    WBC 3.6 04/05/2017      Lab Results   Component Value Date    ANEU 2.6 04/05/2017     Lab Results   Component Value Date    PLT 93 04/05/2017      Lab Results   Component Value Date     04/05/2017                   Lab Results   Component Value Date    POTASSIUM 3.8 04/05/2017           Lab Results   Component Value Date    MAG 2.0 04/05/2017            Lab Results   Component Value Date    CR 1.23 04/05/2017                   Lab Results   Component Value Date    COLBY 8.5 04/05/2017                Lab Results   Component Value Date    BILITOTAL 0.6 04/05/2017           Lab Results   Component Value Date    ALBUMIN 4.0 04/05/2017                    Lab Results   Component Value Date    ALT 17 04/05/2017           Lab Results   Component Value Date    AST 14 04/05/2017     Results reviewed, labs MET treatment parameters, ok to proceed with treatment.      Post Infusion Assessment:  Patient tolerated infusion without incident.  Blood return noted pre and post infusion.  Site patent and intact, free from redness, edema or discomfort.  No evidence of extravasations.  Access discontinued per protocol.    Discharge  Plan:   Prescription refills given for Ativan, Compazine, Etoposide and Prednisone.  Discharge instructions reviewed with: Patient.  Patient verbalized understanding of discharge instructions and all questions answered.  Copy of AVS reviewed with patient.  Patient will return 4/8/17 for Neulasta and 4/26/17 for next appointment.  Patient discharged in stable condition accompanied by: self.  Departure Mode: Ambulatory.    Bridget Smith RN

## 2017-04-05 NOTE — NURSING NOTE
Chief Complaint   Patient presents with     Port Draw     labs drawn from port by RN     Port accessed, labs drawn, flushed with NS & heparin.  Patient checked in for infusion visit.  Emi Dobbins RN

## 2017-04-05 NOTE — TELEPHONE ENCOUNTER
Per transplant team, Dennise Umana.  4/5/17   Stop Cellcept. Decrease Prograf to 1mg po bid.  4/12/17 Stop Prograf.  4/10/17 Start Prednisone 10 mg daily.   If taking Prednisone while on chemotherapy, no need to take additional dose. Resume Prednisone 10 mg daily,  when chemotherapy round is done  Pt. Verbalized understanding of the medications changes. Will communicate them in a [employer] message as well.

## 2017-04-08 ENCOUNTER — INFUSION THERAPY VISIT (OUTPATIENT)
Dept: ONCOLOGY | Facility: CLINIC | Age: 37
End: 2017-04-08
Attending: INTERNAL MEDICINE
Payer: COMMERCIAL

## 2017-04-08 VITALS
HEART RATE: 79 BPM | OXYGEN SATURATION: 99 % | SYSTOLIC BLOOD PRESSURE: 136 MMHG | RESPIRATION RATE: 18 BRPM | DIASTOLIC BLOOD PRESSURE: 79 MMHG | TEMPERATURE: 98.6 F

## 2017-04-08 DIAGNOSIS — D47.Z1 POST-TRANSPLANT LYMPHOPROLIFERATIVE DISORDER (H): Primary | ICD-10-CM

## 2017-04-08 DIAGNOSIS — Z51.89 ENCOUNTER FOR OTHER SPECIFIED AFTERCARE: ICD-10-CM

## 2017-04-08 PROCEDURE — 25000128 H RX IP 250 OP 636: Mod: ZF | Performed by: INTERNAL MEDICINE

## 2017-04-08 PROCEDURE — 96372 THER/PROPH/DIAG INJ SC/IM: CPT

## 2017-04-08 RX ADMIN — PEGFILGRASTIM 6 MG: 6 INJECTION SUBCUTANEOUS at 11:16

## 2017-04-08 NOTE — MR AVS SNAPSHOT
After Visit Summary   4/8/2017    Cricket Chen    MRN: [id number]           Patient Information     Date Of Birth          1980        Visit Information        Provider Department      4/8/2017 11:00 AM UC 11 ATC; UC ONCOLOGY INFUSION [facility]        Today's Diagnoses     Post-transplant lymphoproliferative disorder (H)    -  1    Encounter for other specified aftercare           Follow-ups after your visit        Your next 10 appointments already scheduled     Apr 12, 2017 11:15 AM CDT   Masonic Lab Draw with UC MASONIC LAB DRAW   [facility] Masonic Lab Draw ([facility])    [address] [place] [address]   [phone]            Apr 18, 2017  9:45 AM CDT   Mason[facility] Lab Draw with  [facility] LAB DRAW   [facility]    [address] [place] [address]   [phone]            Apr 18, 2017 10:30 AM CDT   (Arrive by 10:00 AM)   Return Kidney Transplant with Jake Sidhu MD   [facility] Nephrology ([facility])    [address] [place] [address]   [phone]            Apr 26, 2017  9:45 AM CDT   Masonic Lab Draw with  MASONIC LAB DRAW   [facility] Masonic Lab Draw ([facility])    [address] [place] [address]   [phone]            Apr 26, 2017 10:20 AM CDT   (Arrive by 10:05 AM)   Return Visit with Krystina Durham PA-C   [facility] ([facility])    [address] [place] [address]   [phone]            Apr 26, 2017 11:30 AM CDT   Infusion 360 with UC ONCOLOGY INFUSION, UC 13 ATC   [facility] ([facility])    [address] [place] [address]   [phone]            Jun 26, 2017 11:00 AM  CDT   Lab with  LAB   [facility] Lab ([facility])    [address]  1st Floor  [facility] [place] [address] [phone]            Jun 26, 2017 11:50 AM CDT   (Arrive by 11:35 AM)   Return Liver Transplant with Laureen Galvan MD   [facility] Hepatology ([facility])    [address]  3rd Floor  [facility] [place] [address] [phone]              Who to contact     If you have questions or need follow up information about today's clinic visit or your schedule please contact [facility] directly at [phone].  Normal or non-critical lab and imaging results will be communicated to you by MyChart, letter or phone within 4 business days after the clinic has received the results. If you do not hear from us within 7 days, please contact the clinic through [employer] or phone. If you have a critical or abnormal lab result, we will notify you by phone as soon as possible.  Submit refill requests through [employer] or call your pharmacy and they will forward the refill request to us. Please allow 3 business days for your refill to be completed.          Additional Information About Your Visit        [employer] Information     [employer] gives you secure access to your electronic health record. If you see a primary care provider, you can also send messages to your care team and make appointments. If you have questions, please call your primary care clinic.  If you do not have a primary care provider, please call [phone] and they will assist you.        Care EveryWhere ID     This is your Care EveryWhere ID. This could be used by other organizations to access your [facility] medical records  [id number]        Your Vitals Were     Pulse Temperature Respirations Pulse Oximetry          79 98.6  F (37  C) (Oral) 18 99%         Blood Pressure from Last 3 Encounters:   04/08/17 136/79   04/05/17 131/85   04/03/17 141/80    Weight from Last 3  Encounters:   04/05/17 93.6 kg (206 lb 6.4 oz)   04/03/17 90.7 kg (200 lb)   03/30/17 93.6 kg (206 lb 5.6 oz)              Today, you had the following     No orders found for display       Primary Care Provider Office Phone # Fax #    [place] Katerina Perez [phone]       [address]  [facility] [address]        Thank you!     Thank you for choosing [facility]  for your care. Our goal is always to provide you with excellent care. Hearing back from our patients is one way we can continue to improve our services. Please take a few minutes to complete the written survey that you may receive in the mail after your visit with us. Thank you!             Your Updated Medication List - Protect others around you: Learn how to safely use, store and throw away your medicines at www.disposemymeds.org.          This list is accurate as of: 4/8/17 12:05 PM.  Always use your most recent med list.                   Brand Name Dispense Instructions for use    amLODIPine 10 MG tablet    NORVASC    90 tablet    Take 1 tablet (10 mg) by mouth daily       etoposide 50 MG capsule CHEMO     8 capsule    Take 4 capsules (200 mg) by mouth daily for 2 days Day 2 and 3.       lisinopril 5 MG tablet    PRINIVIL/ZESTRIL    90 tablet    Take 1 tablet (5 mg) by mouth daily       LORazepam 0.5 MG tablet    ATIVAN    30 tablet    Take 1 tablet (0.5 mg) by mouth every 4 hours as needed (Anxiety, Nausea/Vomiting or Sleep)       magnesium oxide 400 (241.3 MG) MG tablet    MAG-OX    120 tablet    Take 2 tablets (800 mg) by mouth 2 times daily       * predniSONE 50 MG tablet    DELTASONE    10 tablet    Take 1 tablet (50 mg) by mouth 2 times daily for 5 days Days 1 through 5.       * predniSONE 10 MG tablet   Start taking on:  4/11/2017    DELTASONE    21 tablet    Take 1 tablet (10 mg) by mouth daily       prochlorperazine 10 MG tablet    COMPAZINE    30 tablet    Take 1 tablet (10  mg) by mouth every 6 hours as needed (Nausea/Vomiting)       sulfamethoxazole-trimethoprim 400-80 MG per tablet    BACTRIM/SEPTRA    30 tablet    Take 1 tablet by mouth daily       tacrolimus capsule     14 capsule    Take 1 capsule (1 mg) by mouth 2 times daily for 7 days       traMADol 50 MG tablet    ULTRAM    45 tablet    Take 1 tablet (50 mg) by mouth every 8 hours as needed for moderate pain       vitamin D 2000 UNITS tablet     100 tablet    Take 2,000 Units by mouth daily       * Notice:  This list has 2 medication(s) that are the same as other medications prescribed for you. Read the directions carefully, and ask your doctor or other care provider to review them with you.

## 2017-04-08 NOTE — PROGRESS NOTES
Infusion Nursing Note:  Cricket Chen presents today for Neulasta.    Patient seen by provider today: No   present during visit today: Not Applicable.    Note:   Pt denies any issues or concerns today.     Pt's first time receiving Neulasta today. Pt observed for 20 mins post injection. Pt given written information on drug and reviewed common side effects. Pt verbalized understanding of indications to call and/or seek medical attention.    Intravenous Access:  No Intravenous access/labs at this visit.    Treatment Conditions:  Not Applicable.      Post Infusion Assessment:  Patient tolerated injection without incident.    Discharge Plan:   Schedule reviewed with patient and/or family.  Patient will return 4/12 for labs and 4/26 next provider/infusion appointment.  Patient discharged in stable condition accompanied by: self.  Departure Mode: Ambulatory.    Sol Marie RN

## 2017-04-10 ENCOUNTER — TRANSFERRED RECORDS (OUTPATIENT)
Dept: HEALTH INFORMATION MANAGEMENT | Facility: CLINIC | Age: 37
End: 2017-04-10

## 2017-04-12 DIAGNOSIS — Z94.4 LIVER REPLACED BY TRANSPLANT (H): ICD-10-CM

## 2017-04-12 LAB
ALBUMIN SERPL-MCNC: 3.5 G/DL (ref 3.4–5)
ALP SERPL-CCNC: 86 U/L (ref 40–150)
ALT SERPL W P-5'-P-CCNC: 30 U/L (ref 0–70)
ANION GAP SERPL CALCULATED.3IONS-SCNC: 7 MMOL/L (ref 3–14)
AST SERPL W P-5'-P-CCNC: 14 U/L (ref 0–45)
BILIRUB DIRECT SERPL-MCNC: 0.2 MG/DL (ref 0–0.2)
BILIRUB SERPL-MCNC: 0.7 MG/DL (ref 0.2–1.3)
BUN SERPL-MCNC: 18 MG/DL (ref 7–30)
CALCIUM SERPL-MCNC: 8.6 MG/DL (ref 8.5–10.1)
CHLORIDE SERPL-SCNC: 106 MMOL/L (ref 94–109)
CO2 SERPL-SCNC: 26 MMOL/L (ref 20–32)
CREAT SERPL-MCNC: 1.09 MG/DL (ref 0.66–1.25)
ERYTHROCYTE [DISTWIDTH] IN BLOOD BY AUTOMATED COUNT: 12.8 % (ref 10–15)
GFR SERPL CREATININE-BSD FRML MDRD: 76 ML/MIN/1.7M2
GLUCOSE SERPL-MCNC: 149 MG/DL (ref 70–99)
HCT VFR BLD AUTO: 37.5 % (ref 40–53)
HGB BLD-MCNC: 12.7 G/DL (ref 13.3–17.7)
MAGNESIUM SERPL-MCNC: 2 MG/DL (ref 1.6–2.3)
MCH RBC QN AUTO: 30.5 PG (ref 26.5–33)
MCHC RBC AUTO-ENTMCNC: 33.9 G/DL (ref 31.5–36.5)
MCV RBC AUTO: 90 FL (ref 78–100)
PHOSPHATE SERPL-MCNC: 2.5 MG/DL (ref 2.5–4.5)
PLATELET # BLD AUTO: 63 10E9/L (ref 150–450)
POTASSIUM SERPL-SCNC: 3.6 MMOL/L (ref 3.4–5.3)
PROT SERPL-MCNC: 6.3 G/DL (ref 6.8–8.8)
RBC # BLD AUTO: 4.16 10E12/L (ref 4.4–5.9)
SODIUM SERPL-SCNC: 139 MMOL/L (ref 133–144)
TACROLIMUS BLD-MCNC: ABNORMAL UG/L (ref 5–15)
TME LAST DOSE: ABNORMAL H
WBC # BLD AUTO: 2.4 10E9/L (ref 4–11)

## 2017-04-12 PROCEDURE — 80076 HEPATIC FUNCTION PANEL: CPT | Performed by: INTERNAL MEDICINE

## 2017-04-12 PROCEDURE — 80048 BASIC METABOLIC PNL TOTAL CA: CPT | Performed by: INTERNAL MEDICINE

## 2017-04-12 PROCEDURE — 84100 ASSAY OF PHOSPHORUS: CPT | Performed by: INTERNAL MEDICINE

## 2017-04-12 PROCEDURE — 25000128 H RX IP 250 OP 636: Performed by: INTERNAL MEDICINE

## 2017-04-12 PROCEDURE — 85027 COMPLETE CBC AUTOMATED: CPT | Performed by: INTERNAL MEDICINE

## 2017-04-12 PROCEDURE — 80197 ASSAY OF TACROLIMUS: CPT | Performed by: INTERNAL MEDICINE

## 2017-04-12 PROCEDURE — 83735 ASSAY OF MAGNESIUM: CPT | Performed by: INTERNAL MEDICINE

## 2017-04-12 RX ORDER — HEPARIN SODIUM (PORCINE) LOCK FLUSH IV SOLN 100 UNIT/ML 100 UNIT/ML
5 SOLUTION INTRAVENOUS
Status: COMPLETED | OUTPATIENT
Start: 2017-04-12 | End: 2017-04-12

## 2017-04-12 RX ADMIN — SODIUM CHLORIDE, PRESERVATIVE FREE 5 ML: 5 INJECTION INTRAVENOUS at 11:42

## 2017-04-12 ASSESSMENT — ENCOUNTER SYMPTOMS
TASTE DISTURBANCE: 0
NECK MASS: 0
SINUS CONGESTION: 1
SMELL DISTURBANCE: 0
SINUS CONGESTION: 1
TROUBLE SWALLOWING: 0
TASTE DISTURBANCE: 0
SMELL DISTURBANCE: 0
HOARSE VOICE: 0
HOARSE VOICE: 0
TROUBLE SWALLOWING: 0
SINUS PAIN: 0
NECK MASS: 0
SINUS PAIN: 0

## 2017-04-12 NOTE — NURSING NOTE
Chief Complaint   Patient presents with     Port Draw      Post-transplant lymphoproliferative disorder.  port accessed and labs drawn by rn.       Pt with history of post-transplant lymphoproliferative disorder. Port accessed, labs drawn, port flushed with saline and heparin, port de-accessed.  Danyelle Magana RN

## 2017-04-13 ENCOUNTER — TELEPHONE (OUTPATIENT)
Dept: ONCOLOGY | Facility: CLINIC | Age: 37
End: 2017-04-13

## 2017-04-13 NOTE — TELEPHONE ENCOUNTER
Patient called to report low back pain and muscle spasms. He called the on-call doctor last night to report symptoms and they suggested that he take an Ativan. He states he did this and he was able to get some sleep after that. He says pain started on Monday and was the worse last night. He states the pain in the back was a 9/10 last night with constant muscle spasms. He reports pain in bilateral hips but denies any pain in legs or shooting pain down legs. He has been using tramadol for pain. He denies any known injury. He also denies any fever, urinary symptoms, or other symptoms at this time. He reports that his pain is much better today but is not completely gone. He rates pain 4/10 today and says he is still having low back pain but he is no longer having spasms.     Per BONNIE Alberto, pain is likely related to neulasta that was given on 4/8. Patient should take Claritin to help with pain, use ice or heat as needed, continue with tramadol, and call tomorrow with an update of how he is feeling. If he develops any severe pain again tonight then he should be seen in the ED. Krystina also reviewed patients lab results from 4/12.     Patient notified of Krystina's recommendations and verbalized understanding and agrees to plan.    VAL Torres

## 2017-04-18 ENCOUNTER — OFFICE VISIT (OUTPATIENT)
Dept: NEPHROLOGY | Facility: CLINIC | Age: 37
End: 2017-04-18
Attending: INTERNAL MEDICINE
Payer: COMMERCIAL

## 2017-04-18 VITALS
DIASTOLIC BLOOD PRESSURE: 81 MMHG | WEIGHT: 205.7 LBS | TEMPERATURE: 98.3 F | OXYGEN SATURATION: 100 % | HEIGHT: 70 IN | HEART RATE: 71 BPM | SYSTOLIC BLOOD PRESSURE: 128 MMHG | BODY MASS INDEX: 29.45 KG/M2

## 2017-04-18 DIAGNOSIS — Z94.4 LIVER REPLACED BY TRANSPLANT (H): ICD-10-CM

## 2017-04-18 DIAGNOSIS — D47.Z1 POST-TRANSPLANT LYMPHOPROLIFERATIVE DISORDER (H): ICD-10-CM

## 2017-04-18 DIAGNOSIS — N25.81 SECONDARY RENAL HYPERPARATHYROIDISM (H): Primary | ICD-10-CM

## 2017-04-18 DIAGNOSIS — E55.9 VITAMIN D DEFICIENCY: ICD-10-CM

## 2017-04-18 DIAGNOSIS — D84.9 IMMUNOSUPPRESSED STATUS (H): ICD-10-CM

## 2017-04-18 DIAGNOSIS — Z48.298 AFTERCARE FOLLOWING ORGAN TRANSPLANT: ICD-10-CM

## 2017-04-18 DIAGNOSIS — Z94.0 KIDNEY REPLACED BY TRANSPLANT: ICD-10-CM

## 2017-04-18 LAB
ALBUMIN SERPL-MCNC: 3.6 G/DL (ref 3.4–5)
ALP SERPL-CCNC: 94 U/L (ref 40–150)
ALT SERPL W P-5'-P-CCNC: 29 U/L (ref 0–70)
ANION GAP SERPL CALCULATED.3IONS-SCNC: 9 MMOL/L (ref 3–14)
AST SERPL W P-5'-P-CCNC: 17 U/L (ref 0–45)
BILIRUB DIRECT SERPL-MCNC: <0.1 MG/DL (ref 0–0.2)
BILIRUB SERPL-MCNC: 0.2 MG/DL (ref 0.2–1.3)
BUN SERPL-MCNC: 15 MG/DL (ref 7–30)
CALCIUM SERPL-MCNC: 8.7 MG/DL (ref 8.5–10.1)
CHLORIDE SERPL-SCNC: 109 MMOL/L (ref 94–109)
CO2 SERPL-SCNC: 24 MMOL/L (ref 20–32)
CREAT SERPL-MCNC: 1.1 MG/DL (ref 0.66–1.25)
ERYTHROCYTE [DISTWIDTH] IN BLOOD BY AUTOMATED COUNT: 13.6 % (ref 10–15)
GFR SERPL CREATININE-BSD FRML MDRD: 75 ML/MIN/1.7M2
GLUCOSE SERPL-MCNC: 88 MG/DL (ref 70–99)
HCT VFR BLD AUTO: 38.8 % (ref 40–53)
HGB BLD-MCNC: 12.9 G/DL (ref 13.3–17.7)
MAGNESIUM SERPL-MCNC: 2 MG/DL (ref 1.6–2.3)
MCH RBC QN AUTO: 30.4 PG (ref 26.5–33)
MCHC RBC AUTO-ENTMCNC: 33.2 G/DL (ref 31.5–36.5)
MCV RBC AUTO: 91 FL (ref 78–100)
PHOSPHATE SERPL-MCNC: 3.2 MG/DL (ref 2.5–4.5)
PLATELET # BLD AUTO: 69 10E9/L (ref 150–450)
POTASSIUM SERPL-SCNC: 3.9 MMOL/L (ref 3.4–5.3)
PROT SERPL-MCNC: 6.5 G/DL (ref 6.8–8.8)
RBC # BLD AUTO: 4.25 10E12/L (ref 4.4–5.9)
SODIUM SERPL-SCNC: 141 MMOL/L (ref 133–144)
WBC # BLD AUTO: 7.7 10E9/L (ref 4–11)

## 2017-04-18 PROCEDURE — 99212 OFFICE O/P EST SF 10 MIN: CPT | Mod: ZF

## 2017-04-18 PROCEDURE — 80048 BASIC METABOLIC PNL TOTAL CA: CPT | Performed by: INTERNAL MEDICINE

## 2017-04-18 PROCEDURE — 83735 ASSAY OF MAGNESIUM: CPT | Performed by: INTERNAL MEDICINE

## 2017-04-18 PROCEDURE — 25000128 H RX IP 250 OP 636: Performed by: INTERNAL MEDICINE

## 2017-04-18 PROCEDURE — 85027 COMPLETE CBC AUTOMATED: CPT | Performed by: INTERNAL MEDICINE

## 2017-04-18 PROCEDURE — 80076 HEPATIC FUNCTION PANEL: CPT | Performed by: INTERNAL MEDICINE

## 2017-04-18 PROCEDURE — 84100 ASSAY OF PHOSPHORUS: CPT | Performed by: INTERNAL MEDICINE

## 2017-04-18 RX ORDER — HEPARIN SODIUM (PORCINE) LOCK FLUSH IV SOLN 100 UNIT/ML 100 UNIT/ML
5 SOLUTION INTRAVENOUS ONCE
Status: COMPLETED | OUTPATIENT
Start: 2017-04-18 | End: 2017-04-18

## 2017-04-18 RX ADMIN — SODIUM CHLORIDE, PRESERVATIVE FREE 5 ML: 5 INJECTION INTRAVENOUS at 10:22

## 2017-04-18 ASSESSMENT — PAIN SCALES - GENERAL: PAINLEVEL: NO PAIN (0)

## 2017-04-18 NOTE — LETTER
4/18/2017      RE: Cricket Chen  [address]  [place] [address]       Assessment and Plan:  1. DDKT - baseline Cr ~ 1.0-1.2, which is stable.  No DSA currently.  Normal urine protein.  Patient started on chemotherapy (Rituxan, Vincristine, Cytoxan, and Etoposide) on 4/5/17.  Currently on  prednisone 10 mg daily.  2. HTN - well controlled at target of less than 140/90.  Recommend taking amlodipine at night to reduce side effects of edema.  Edema also improved because patient off of prograf.  3. Secondary renal hyperparathyroidism - PTH elevated to 184 at last check.  He is vitamin D deficient.  Recheck PTH level with next months labs now that he is on cholecalciferol 2000 units daily.  4. Vitamin D deficiency - Patient with vitamin D level of 13.  Recheck vitamin D level with next months labs.  5. Hypomagnesemia - normal serum magnesium level and will continue on oral magnesium supplement.  6. Thrombocytopenia - stable platelet level generally 60-80s.  May worsen with concurrent chemotherapy.  7. Liver transplant - appears stable with normal LFTs.  8. PTLD/DLBCL - Plan for 4 cycles (every 3 weeks) of modified R-CEOP therapy, started 4/5/17  9. Papillary Thyroid Cancer - surgery for thyroid cancer deferred until completion of 4 cycles of therapy.  10. EBV Viremia - currently undetectable.  Received four doses of rituximab 2/10-3/3.  Currently on chemotherapy for DLBCL and on low dose immunosuppression (prednisone 10 mg daily)  11. Recommend return visit in 3 months.    Assessment and plan was discussed with patient and he voiced his understanding and agreement.    Attestation:  This patient has been seen and evaluated by me, Jake Sidhu MD.  I have reviewed the note and agree with plan of care as documented by the fellow.       Reason for Visit:  Mr. Chen is here for routine follow up.    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         Transplant Hx:       Tx: DDKT  Date: 5/11/16       Tx: Liver Tx Date: 5/11/16       Present Maintenance IS: Tacrolimus and Mycophenolate mofetil       Baseline Creatinine: 1.0-1.2       Recent DSA: Yes  Date last checked: 5/2016       Biopsy: No    Since last visit, Mr. Chen has started chemotherapy for PTLD.  He was incidentially found to have papillary thyroid cancer as well.  He follows with Dr. Narayanan.  First cycle of chemotherapy was on 4/5.  He has transposition of the great vessels and reduced cardiac function.  He is on a modified chemotherapy regiment (avoiding anthracyclines) and recently saw his cardiologist.  His only issue is upper respiratory symptoms and notes several sick contacts at home.  He denies fevers, chills, night sweats, or weight loss at this time.  No urinary symptoms.  No abdominal pain, nausea, vomiting, or diarrhea.    His energy level is good.  He is active, but really gets minimal exercise.  Denies any chest pain or shortness of breath with exertion.  Appetite is good and he is drinking plenty of fluids.      He notes edema prior to starting chemotherapy.  This was attributed to amlodipine which he remains on.  His prograf was titrated off since starting chemotherapy.  He is currently on prednisone 10 mg daily.    Home BP: 120/70s.      ROS:   A comprehensive review of systems was obtained and negative, except as noted in the HPI or PMH.    Active Medical Problems:  Patient Active Problem List   Diagnosis     Atrial flutter (H)     Complete transposition of great vessels     Esophageal varices (H)     Insomnia     Alcoholic hepatitis     History of alcohol abuse     History of transposition of great vessels     Depression     Thrombocytopenia (H)     Pain medication agreement     Congenital anomaly of heart     Liver replaced by transplant (H)     Kidney replaced by transplant     Immunosuppressed status  (H)     Hypomagnesemia     Secondary renal hyperparathyroidism (H)     Chronic pain syndrome     Pain management contract signed     Aftercare following organ transplant     Post-transplant lymphoproliferative disorder (H)     Cervicalgia     Right shoulder pain     Encounter for other specified aftercare       Personal Hx:  Social History     Social History     Marital status: Single     Spouse name: N/A     Number of children: 0     Years of education: N/A     Occupational History      Unemployed     Social History Main Topics     Smoking status: Former Smoker     Packs/day: 0.33     Years: 3.00     Types: Cigarettes     Start date: 8/20/1997     Quit date: 9/21/2000     Smokeless tobacco: Former User     Alcohol use No      Comment: ~10 drinks per day for ten years, quit in April of 2014     Drug use: No     Sexual activity: Not Currently     Partners: Female     Birth control/ protection: None     Other Topics Concern     Not on file     Social History Narrative    ? Blood transfusion as baby during surgery,    Professional performed tattoos     No Illicit IV or intranasal drug use.        Allergies:  Allergies   Allergen Reactions     Hydromorphone Itching       Medications:  Prior to Admission medications    Medication Sig Start Date End Date Taking? Authorizing Provider   traMADol (ULTRAM) 50 MG tablet Take 1 tablet (50 mg) by mouth every 8 hours as needed for moderate pain 12/14/16  Yes Antoinette Nelson MD   magnesium oxide (MAG-OX) 400 (241.3 MG) MG tablet Take 2 tablets (800 mg) by mouth 2 times daily 12/8/16  Yes Jake Sidhu MD   PROGRAF 1 MG PO CAPSULE 4 mg AM and 3 mg PM 10/26/16  Yes Laureen Galvan MD   amLODIPine (NORVASC) 10 MG tablet Take 1 tablet (10 mg) by mouth daily 10/11/16  Yes Jake Sidhu MD   lisinopril (PRINIVIL,ZESTRIL) 5 MG tablet Take 1 tablet (5 mg) by mouth daily 10/11/16  Yes Jake Sidhu MD   mycophenolate (CELLCEPT - GENERIC  EQUIVALENT) 250 MG capsule Take 3 capsules (750 mg) by mouth 2 times daily 8/5/16  Yes Vinod Bermeo MD   sulfamethoxazole-trimethoprim (BACTRIM,SEPTRA) 400-80 MG per tablet Take 1 tablet by mouth daily 5/17/16  Yes Lorenza Alonso PA-C   pantoprazole (PROTONIX) 20 MG tablet Take 1 tablet (20 mg) by mouth daily 3/16/16  Yes Laureen Galvan MD       Vitals:  There were no vitals taken for this visit.    Exam:   GENERAL APPEARANCE: alert and no distress  HENT: mouth without ulcers or lesions  LYMPHATICS: 2 x 2 cm right anterio cervical lymph node that is nontender; no other cervical or supraclavicular nodes  RESP: lungs clear to auscultation - no rales, rhonchi or wheezes  CV: regular rhythm, normal rate, no rub, no murmur  EDEMA: no LE edema bilaterally  ABDOMEN: soft, nondistended, nontender, bowel sounds normal  MS: extremities normal - no gross deformities noted, no evidence of inflammation in joints, no muscle tenderness  SKIN: no rash  TX KIDNEY: normal    Results:   Recent Results (from the past 504 hour(s))   Creatinine POCT    Collection Time: 03/28/17 12:10 PM   Result Value Ref Range    Creatinine 1.3 (H) 0.66 - 1.25 mg/dL    GFR Estimate 62 >60 mL/min/1.7m2    GFR Estimate If Black 76 >60 mL/min/1.7m2   CBC with platelets    Collection Time: 03/30/17  1:29 PM   Result Value Ref Range    WBC 5.1 4.0 - 11.0 10e9/L    RBC Count 4.98 4.4 - 5.9 10e12/L    Hemoglobin 14.9 13.3 - 17.7 g/dL    Hematocrit 44.7 40.0 - 53.0 %    MCV 90 78 - 100 fl    MCH 29.9 26.5 - 33.0 pg    MCHC 33.3 31.5 - 36.5 g/dL    RDW 13.2 10.0 - 15.0 %    Platelet Count 107 (L) 150 - 450 10e9/L   Basic metabolic panel    Collection Time: 03/30/17  1:29 PM   Result Value Ref Range    Sodium 141 133 - 144 mmol/L    Potassium 5.5 (H) 3.4 - 5.3 mmol/L    Chloride 109 94 - 109 mmol/L    Carbon Dioxide 27 20 - 32 mmol/L    Anion Gap 6 3 - 14 mmol/L    Glucose 86 70 - 99 mg/dL    Urea Nitrogen 13 7 - 30 mg/dL     Creatinine 1.15 0.66 - 1.25 mg/dL    GFR Estimate 72 >60 mL/min/1.7m2    GFR Estimate If Black 87 >60 mL/min/1.7m2    Calcium 9.1 8.5 - 10.1 mg/dL   Phosphorus    Collection Time: 03/30/17  1:29 PM   Result Value Ref Range    Phosphorus 2.5 2.5 - 4.5 mg/dL   Magnesium    Collection Time: 03/30/17  1:29 PM   Result Value Ref Range    Magnesium 2.0 1.6 - 2.3 mg/dL   Hepatic panel    Collection Time: 03/30/17  1:29 PM   Result Value Ref Range    Bilirubin Direct 0.2 0.0 - 0.2 mg/dL    Bilirubin Total 0.6 0.2 - 1.3 mg/dL    Albumin 4.2 3.4 - 5.0 g/dL    Protein Total 7.3 6.8 - 8.8 g/dL    Alkaline Phosphatase 89 40 - 150 U/L    ALT 26 0 - 70 U/L    AST 22 0 - 45 U/L   INR    Collection Time: 04/03/17 12:20 PM   Result Value Ref Range    INR 1.09 0.86 - 1.14   CBC with platelets differential    Collection Time: 04/05/17  6:54 AM   Result Value Ref Range    WBC 3.6 (L) 4.0 - 11.0 10e9/L    RBC Count 4.46 4.4 - 5.9 10e12/L    Hemoglobin 13.3 13.3 - 17.7 g/dL    Hematocrit 39.5 (L) 40.0 - 53.0 %    MCV 89 78 - 100 fl    MCH 29.8 26.5 - 33.0 pg    MCHC 33.7 31.5 - 36.5 g/dL    RDW 13.1 10.0 - 15.0 %    Platelet Count 93 (L) 150 - 450 10e9/L    Diff Method Automated Method     % Neutrophils 73.7 %    % Lymphocytes 14.0 %    % Monocytes 10.9 %    % Eosinophils 0.8 %    % Basophils 0.3 %    % Immature Granulocytes 0.3 %    Nucleated RBCs 0 0 /100    Absolute Neutrophil 2.6 1.6 - 8.3 10e9/L    Absolute Lymphocytes 0.5 (L) 0.8 - 5.3 10e9/L    Absolute Monocytes 0.4 0.0 - 1.3 10e9/L    Absolute Eosinophils 0.0 0.0 - 0.7 10e9/L    Absolute Basophils 0.0 0.0 - 0.2 10e9/L    Abs Immature Granulocytes 0.0 0 - 0.4 10e9/L    Absolute Nucleated RBC 0.0    Comprehensive metabolic panel    Collection Time: 04/05/17  6:54 AM   Result Value Ref Range    Sodium 143 133 - 144 mmol/L    Potassium 3.8 3.4 - 5.3 mmol/L    Chloride 110 (H) 94 - 109 mmol/L    Carbon Dioxide 25 20 - 32 mmol/L    Anion Gap 8 3 - 14 mmol/L    Glucose 100 (H) 70 - 99  mg/dL    Urea Nitrogen 17 7 - 30 mg/dL    Creatinine 1.23 0.66 - 1.25 mg/dL    GFR Estimate 66 >60 mL/min/1.7m2    GFR Estimate If Black 80 >60 mL/min/1.7m2    Calcium 8.5 8.5 - 10.1 mg/dL    Bilirubin Total 0.6 0.2 - 1.3 mg/dL    Albumin 4.0 3.4 - 5.0 g/dL    Protein Total 6.7 (L) 6.8 - 8.8 g/dL    Alkaline Phosphatase 74 40 - 150 U/L    ALT 17 0 - 70 U/L    AST 14 0 - 45 U/L   Tacrolimus level    Collection Time: 04/05/17  6:54 AM   Result Value Ref Range    Tacrolimus Last Dose 4/4/17 2230     Tacrolimus Level 8.0 5.0 - 15.0 ug/L   Magnesium    Collection Time: 04/05/17  6:54 AM   Result Value Ref Range    Magnesium 2.0 1.6 - 2.3 mg/dL   Phosphorus    Collection Time: 04/05/17  6:54 AM   Result Value Ref Range    Phosphorus 3.2 2.5 - 4.5 mg/dL   Bilirubin direct    Collection Time: 04/05/17  6:54 AM   Result Value Ref Range    Bilirubin Direct 0.1 0.0 - 0.2 mg/dL   CBC with platelets    Collection Time: 04/12/17 11:31 AM   Result Value Ref Range    WBC 2.4 (L) 4.0 - 11.0 10e9/L    RBC Count 4.16 (L) 4.4 - 5.9 10e12/L    Hemoglobin 12.7 (L) 13.3 - 17.7 g/dL    Hematocrit 37.5 (L) 40.0 - 53.0 %    MCV 90 78 - 100 fl    MCH 30.5 26.5 - 33.0 pg    MCHC 33.9 31.5 - 36.5 g/dL    RDW 12.8 10.0 - 15.0 %    Platelet Count 63 (L) 150 - 450 10e9/L   Basic metabolic panel    Collection Time: 04/12/17 11:31 AM   Result Value Ref Range    Sodium 139 133 - 144 mmol/L    Potassium 3.6 3.4 - 5.3 mmol/L    Chloride 106 94 - 109 mmol/L    Carbon Dioxide 26 20 - 32 mmol/L    Anion Gap 7 3 - 14 mmol/L    Glucose 149 (H) 70 - 99 mg/dL    Urea Nitrogen 18 7 - 30 mg/dL    Creatinine 1.09 0.66 - 1.25 mg/dL    GFR Estimate 76 >60 mL/min/1.7m2    GFR Estimate If Black >90   GFR Calc   >60 mL/min/1.7m2    Calcium 8.6 8.5 - 10.1 mg/dL   Phosphorus    Collection Time: 04/12/17 11:31 AM   Result Value Ref Range    Phosphorus 2.5 2.5 - 4.5 mg/dL   Magnesium    Collection Time: 04/12/17 11:31 AM   Result Value Ref Range     Magnesium 2.0 1.6 - 2.3 mg/dL   Hepatic panel    Collection Time: 04/12/17 11:31 AM   Result Value Ref Range    Bilirubin Direct 0.2 0.0 - 0.2 mg/dL    Bilirubin Total 0.7 0.2 - 1.3 mg/dL    Albumin 3.5 3.4 - 5.0 g/dL    Protein Total 6.3 (L) 6.8 - 8.8 g/dL    Alkaline Phosphatase 86 40 - 150 U/L    ALT 30 0 - 70 U/L    AST 14 0 - 45 U/L   Tacrolimus level    Collection Time: 04/12/17 11:31 AM   Result Value Ref Range    Tacrolimus Last Dose 2245 04/11     Tacrolimus Level (L) 5.0 - 15.0 ug/L     <3.0  Tacrolimus Reference Range   Kidney Transplant   Pediatric                      ug/L     0-3 months post transplant   10-12     3-6 months post transplant   8-10     6-12 months post transplant  6-8     >12 months post transplant   4-7   Adult     0-6 months post transplant   8-10     6-12 months post transplant  6-8     >12 months post transplant   4-6     >5 years post transplant     3-5   Heart Transplant   Pediatric     0-12 months post transplant  10-15     >12 months post transplant   5-10   Adult     0-3 months post transplant   10-15     3-6 months post transplant   8-12     6-12 months post transplant  6-12     >12 months post transplant   6-10   Lung Transplant     0-12 months post transplant  10-15     >12 months post transplant   8-12   Liver Transplant   Pediatric     0-3 months post transplant   10-15     3-6 months post transplant   8-10     >6 months post transplant    6-8   Adult     0-3 months post transplant   10-12     3-6 months post transplant   8-10     >6  months post transplant    6-8   Pancreas Transplant     0-6 months post transplant   8-10     >6 months post transplant    5-8   This test was developed and its performance characteristics determined by the   [facility],  Special Chemistry Laboratory. It has   not been cleared or approved by the FDA. The laboratory is regulated under CLIA   as qualified to perform high-complexity testing. This test is used for  clinical   purposes. It should not be regarded as investigational or for research.             Jake Sidhu MD

## 2017-04-18 NOTE — PROGRESS NOTES
Assessment and Plan:  1. DDKT - baseline Cr ~ 1.0-1.2, which is stable.  No DSA currently.  Normal urine protein.  Patient started on chemotherapy (Rituxan, Vincristine, Cytoxan, and Etoposide) on 4/5/17.  Currently on  prednisone 10 mg daily.  2. HTN - well controlled at target of less than 140/90.  Recommend taking amlodipine at night to reduce side effects of edema.  Edema also improved because patient off of prograf.  3. Secondary renal hyperparathyroidism - PTH elevated to 184 at last check.  He is vitamin D deficient.  Recheck PTH level with next months labs now that he is on cholecalciferol 2000 units daily.  4. Vitamin D deficiency - Patient with vitamin D level of 13.  Recheck vitamin D level with next months labs.  5. Hypomagnesemia - normal serum magnesium level and will continue on oral magnesium supplement.  6. Thrombocytopenia - stable platelet level generally 60-80s.  May worsen with concurrent chemotherapy.  7. Liver transplant - appears stable with normal LFTs.  8. PTLD/DLBCL - Plan for 4 cycles (every 3 weeks) of modified R-CEOP therapy, started 4/5/17  9. Papillary Thyroid Cancer - surgery for thyroid cancer deferred until completion of 4 cycles of therapy.  10. EBV Viremia - currently undetectable.  Received four doses of rituximab 2/10-3/3.  Currently on chemotherapy for DLBCL and on low dose immunosuppression (prednisone 10 mg daily)  11. Recommend return visit in 3 months.    Assessment and plan was discussed with patient and he voiced his understanding and agreement.    Attestation:  This patient has been seen and evaluated by me, Jake Sidhu MD.  I have reviewed the note and agree with plan of care as documented by the fellow.       Reason for Visit:  Mr. Chen is here for routine follow up.    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         Transplant Hx:        Tx: DDKT  Date: 5/11/16       Tx: Liver Tx Date: 5/11/16       Present Maintenance IS: Tacrolimus and Mycophenolate mofetil       Baseline Creatinine: 1.0-1.2       Recent DSA: Yes  Date last checked: 5/2016       Biopsy: No    Since last visit, Mr. Chen has started chemotherapy for PTLD.  He was incidentially found to have papillary thyroid cancer as well.  He follows with Dr. Narayanan.  First cycle of chemotherapy was on 4/5.  He has transposition of the great vessels and reduced cardiac function.  He is on a modified chemotherapy regiment (avoiding anthracyclines) and recently saw his cardiologist.  His only issue is upper respiratory symptoms and notes several sick contacts at home.  He denies fevers, chills, night sweats, or weight loss at this time.  No urinary symptoms.  No abdominal pain, nausea, vomiting, or diarrhea.    His energy level is good.  He is active, but really gets minimal exercise.  Denies any chest pain or shortness of breath with exertion.  Appetite is good and he is drinking plenty of fluids.      He notes edema prior to starting chemotherapy.  This was attributed to amlodipine which he remains on.  His prograf was titrated off since starting chemotherapy.  He is currently on prednisone 10 mg daily.    Home BP: 120/70s.      ROS:   A comprehensive review of systems was obtained and negative, except as noted in the HPI or PMH.    Active Medical Problems:  Patient Active Problem List   Diagnosis     Atrial flutter (H)     Complete transposition of great vessels     Esophageal varices (H)     Insomnia     Alcoholic hepatitis     History of alcohol abuse     History of transposition of great vessels     Depression     Thrombocytopenia (H)     Pain medication agreement     Congenital anomaly of heart     Liver replaced by transplant (H)     Kidney replaced by transplant     Immunosuppressed status (H)     Hypomagnesemia     Secondary renal hyperparathyroidism (H)     Chronic pain syndrome      Pain management contract signed     Aftercare following organ transplant     Post-transplant lymphoproliferative disorder (H)     Cervicalgia     Right shoulder pain     Encounter for other specified aftercare       Personal Hx:  Social History     Social History     Marital status: Single     Spouse name: N/A     Number of children: 0     Years of education: N/A     Occupational History      Unemployed     Social History Main Topics     Smoking status: Former Smoker     Packs/day: 0.33     Years: 3.00     Types: Cigarettes     Start date: 8/20/1997     Quit date: 9/21/2000     Smokeless tobacco: Former User     Alcohol use No      Comment: ~10 drinks per day for ten years, quit in April of 2014     Drug use: No     Sexual activity: Not Currently     Partners: Female     Birth control/ protection: None     Other Topics Concern     Not on file     Social History Narrative    ? Blood transfusion as baby during surgery,    Professional performed tattoos     No Illicit IV or intranasal drug use.        Allergies:  Allergies   Allergen Reactions     Hydromorphone Itching       Medications:  Prior to Admission medications    Medication Sig Start Date End Date Taking? Authorizing Provider   traMADol (ULTRAM) 50 MG tablet Take 1 tablet (50 mg) by mouth every 8 hours as needed for moderate pain 12/14/16  Yes Antoinette Nelson MD   magnesium oxide (MAG-OX) 400 (241.3 MG) MG tablet Take 2 tablets (800 mg) by mouth 2 times daily 12/8/16  Yes Jake Sidhu MD   PROGRAF 1 MG PO CAPSULE 4 mg AM and 3 mg PM 10/26/16  Yes Laureen Galvan MD   amLODIPine (NORVASC) 10 MG tablet Take 1 tablet (10 mg) by mouth daily 10/11/16  Yes Jake Sidhu MD   lisinopril (PRINIVIL,ZESTRIL) 5 MG tablet Take 1 tablet (5 mg) by mouth daily 10/11/16  Yes Jake Sidhu MD   mycophenolate (CELLCEPT - GENERIC EQUIVALENT) 250 MG capsule Take 3 capsules (750 mg) by mouth 2 times daily 8/5/16  Yes  Vinod Bermeo MD   sulfamethoxazole-trimethoprim (BACTRIM,SEPTRA) 400-80 MG per tablet Take 1 tablet by mouth daily 5/17/16  Yes Lorenza Alonso PA-C   pantoprazole (PROTONIX) 20 MG tablet Take 1 tablet (20 mg) by mouth daily 3/16/16  Yes Laureen Galvan MD       Vitals:  There were no vitals taken for this visit.    Exam:   GENERAL APPEARANCE: alert and no distress  HENT: mouth without ulcers or lesions  LYMPHATICS: 2 x 2 cm right anterio cervical lymph node that is nontender; no other cervical or supraclavicular nodes  RESP: lungs clear to auscultation - no rales, rhonchi or wheezes  CV: regular rhythm, normal rate, no rub, no murmur  EDEMA: no LE edema bilaterally  ABDOMEN: soft, nondistended, nontender, bowel sounds normal  MS: extremities normal - no gross deformities noted, no evidence of inflammation in joints, no muscle tenderness  SKIN: no rash  TX KIDNEY: normal    Results:   Recent Results (from the past 504 hour(s))   Creatinine POCT    Collection Time: 03/28/17 12:10 PM   Result Value Ref Range    Creatinine 1.3 (H) 0.66 - 1.25 mg/dL    GFR Estimate 62 >60 mL/min/1.7m2    GFR Estimate If Black 76 >60 mL/min/1.7m2   CBC with platelets    Collection Time: 03/30/17  1:29 PM   Result Value Ref Range    WBC 5.1 4.0 - 11.0 10e9/L    RBC Count 4.98 4.4 - 5.9 10e12/L    Hemoglobin 14.9 13.3 - 17.7 g/dL    Hematocrit 44.7 40.0 - 53.0 %    MCV 90 78 - 100 fl    MCH 29.9 26.5 - 33.0 pg    MCHC 33.3 31.5 - 36.5 g/dL    RDW 13.2 10.0 - 15.0 %    Platelet Count 107 (L) 150 - 450 10e9/L   Basic metabolic panel    Collection Time: 03/30/17  1:29 PM   Result Value Ref Range    Sodium 141 133 - 144 mmol/L    Potassium 5.5 (H) 3.4 - 5.3 mmol/L    Chloride 109 94 - 109 mmol/L    Carbon Dioxide 27 20 - 32 mmol/L    Anion Gap 6 3 - 14 mmol/L    Glucose 86 70 - 99 mg/dL    Urea Nitrogen 13 7 - 30 mg/dL    Creatinine 1.15 0.66 - 1.25 mg/dL    GFR Estimate 72 >60 mL/min/1.7m2    GFR Estimate If Black  87 >60 mL/min/1.7m2    Calcium 9.1 8.5 - 10.1 mg/dL   Phosphorus    Collection Time: 03/30/17  1:29 PM   Result Value Ref Range    Phosphorus 2.5 2.5 - 4.5 mg/dL   Magnesium    Collection Time: 03/30/17  1:29 PM   Result Value Ref Range    Magnesium 2.0 1.6 - 2.3 mg/dL   Hepatic panel    Collection Time: 03/30/17  1:29 PM   Result Value Ref Range    Bilirubin Direct 0.2 0.0 - 0.2 mg/dL    Bilirubin Total 0.6 0.2 - 1.3 mg/dL    Albumin 4.2 3.4 - 5.0 g/dL    Protein Total 7.3 6.8 - 8.8 g/dL    Alkaline Phosphatase 89 40 - 150 U/L    ALT 26 0 - 70 U/L    AST 22 0 - 45 U/L   INR    Collection Time: 04/03/17 12:20 PM   Result Value Ref Range    INR 1.09 0.86 - 1.14   CBC with platelets differential    Collection Time: 04/05/17  6:54 AM   Result Value Ref Range    WBC 3.6 (L) 4.0 - 11.0 10e9/L    RBC Count 4.46 4.4 - 5.9 10e12/L    Hemoglobin 13.3 13.3 - 17.7 g/dL    Hematocrit 39.5 (L) 40.0 - 53.0 %    MCV 89 78 - 100 fl    MCH 29.8 26.5 - 33.0 pg    MCHC 33.7 31.5 - 36.5 g/dL    RDW 13.1 10.0 - 15.0 %    Platelet Count 93 (L) 150 - 450 10e9/L    Diff Method Automated Method     % Neutrophils 73.7 %    % Lymphocytes 14.0 %    % Monocytes 10.9 %    % Eosinophils 0.8 %    % Basophils 0.3 %    % Immature Granulocytes 0.3 %    Nucleated RBCs 0 0 /100    Absolute Neutrophil 2.6 1.6 - 8.3 10e9/L    Absolute Lymphocytes 0.5 (L) 0.8 - 5.3 10e9/L    Absolute Monocytes 0.4 0.0 - 1.3 10e9/L    Absolute Eosinophils 0.0 0.0 - 0.7 10e9/L    Absolute Basophils 0.0 0.0 - 0.2 10e9/L    Abs Immature Granulocytes 0.0 0 - 0.4 10e9/L    Absolute Nucleated RBC 0.0    Comprehensive metabolic panel    Collection Time: 04/05/17  6:54 AM   Result Value Ref Range    Sodium 143 133 - 144 mmol/L    Potassium 3.8 3.4 - 5.3 mmol/L    Chloride 110 (H) 94 - 109 mmol/L    Carbon Dioxide 25 20 - 32 mmol/L    Anion Gap 8 3 - 14 mmol/L    Glucose 100 (H) 70 - 99 mg/dL    Urea Nitrogen 17 7 - 30 mg/dL    Creatinine 1.23 0.66 - 1.25 mg/dL    GFR Estimate 66  >60 mL/min/1.7m2    GFR Estimate If Black 80 >60 mL/min/1.7m2    Calcium 8.5 8.5 - 10.1 mg/dL    Bilirubin Total 0.6 0.2 - 1.3 mg/dL    Albumin 4.0 3.4 - 5.0 g/dL    Protein Total 6.7 (L) 6.8 - 8.8 g/dL    Alkaline Phosphatase 74 40 - 150 U/L    ALT 17 0 - 70 U/L    AST 14 0 - 45 U/L   Tacrolimus level    Collection Time: 04/05/17  6:54 AM   Result Value Ref Range    Tacrolimus Last Dose 4/4/17 2230     Tacrolimus Level 8.0 5.0 - 15.0 ug/L   Magnesium    Collection Time: 04/05/17  6:54 AM   Result Value Ref Range    Magnesium 2.0 1.6 - 2.3 mg/dL   Phosphorus    Collection Time: 04/05/17  6:54 AM   Result Value Ref Range    Phosphorus 3.2 2.5 - 4.5 mg/dL   Bilirubin direct    Collection Time: 04/05/17  6:54 AM   Result Value Ref Range    Bilirubin Direct 0.1 0.0 - 0.2 mg/dL   CBC with platelets    Collection Time: 04/12/17 11:31 AM   Result Value Ref Range    WBC 2.4 (L) 4.0 - 11.0 10e9/L    RBC Count 4.16 (L) 4.4 - 5.9 10e12/L    Hemoglobin 12.7 (L) 13.3 - 17.7 g/dL    Hematocrit 37.5 (L) 40.0 - 53.0 %    MCV 90 78 - 100 fl    MCH 30.5 26.5 - 33.0 pg    MCHC 33.9 31.5 - 36.5 g/dL    RDW 12.8 10.0 - 15.0 %    Platelet Count 63 (L) 150 - 450 10e9/L   Basic metabolic panel    Collection Time: 04/12/17 11:31 AM   Result Value Ref Range    Sodium 139 133 - 144 mmol/L    Potassium 3.6 3.4 - 5.3 mmol/L    Chloride 106 94 - 109 mmol/L    Carbon Dioxide 26 20 - 32 mmol/L    Anion Gap 7 3 - 14 mmol/L    Glucose 149 (H) 70 - 99 mg/dL    Urea Nitrogen 18 7 - 30 mg/dL    Creatinine 1.09 0.66 - 1.25 mg/dL    GFR Estimate 76 >60 mL/min/1.7m2    GFR Estimate If Black >90   GFR Calc   >60 mL/min/1.7m2    Calcium 8.6 8.5 - 10.1 mg/dL   Phosphorus    Collection Time: 04/12/17 11:31 AM   Result Value Ref Range    Phosphorus 2.5 2.5 - 4.5 mg/dL   Magnesium    Collection Time: 04/12/17 11:31 AM   Result Value Ref Range    Magnesium 2.0 1.6 - 2.3 mg/dL   Hepatic panel    Collection Time: 04/12/17 11:31 AM   Result Value  Ref Range    Bilirubin Direct 0.2 0.0 - 0.2 mg/dL    Bilirubin Total 0.7 0.2 - 1.3 mg/dL    Albumin 3.5 3.4 - 5.0 g/dL    Protein Total 6.3 (L) 6.8 - 8.8 g/dL    Alkaline Phosphatase 86 40 - 150 U/L    ALT 30 0 - 70 U/L    AST 14 0 - 45 U/L   Tacrolimus level    Collection Time: 04/12/17 11:31 AM   Result Value Ref Range    Tacrolimus Last Dose 2245 04/11     Tacrolimus Level (L) 5.0 - 15.0 ug/L     <3.0  Tacrolimus Reference Range   Kidney Transplant   Pediatric                      ug/L     0-3 months post transplant   10-12     3-6 months post transplant   8-10     6-12 months post transplant  6-8     >12 months post transplant   4-7   Adult     0-6 months post transplant   8-10     6-12 months post transplant  6-8     >12 months post transplant   4-6     >5 years post transplant     3-5   Heart Transplant   Pediatric     0-12 months post transplant  10-15     >12 months post transplant   5-10   Adult     0-3 months post transplant   10-15     3-6 months post transplant   8-12     6-12 months post transplant  6-12     >12 months post transplant   6-10   Lung Transplant     0-12 months post transplant  10-15     >12 months post transplant   8-12   Liver Transplant   Pediatric     0-3 months post transplant   10-15     3-6 months post transplant   8-10     >6 months post transplant    6-8   Adult     0-3 months post transplant   10-12     3-6 months post transplant   8-10     >6  months post transplant    6-8   Pancreas Transplant     0-6 months post transplant   8-10     >6 months post transplant    5-8   This test was developed and its performance characteristics determined by the   [facility],  Special Chemistry Laboratory. It has   not been cleared or approved by the FDA. The laboratory is regulated under CLIA   as qualified to perform high-complexity testing. This test is used for clinical   purposes. It should not be regarded as investigational or for research.

## 2017-04-18 NOTE — NURSING NOTE
Chief Complaint   Patient presents with     Port Draw     Labs collected via port by RN.      Port accessed, labs collected, flushed with saline and heparin.   Valentina Silverio RN

## 2017-04-18 NOTE — NURSING NOTE
"Chief Complaint   Patient presents with     RECHECK     Follow up Kidney transplant.       Initial /81  Pulse 71  Temp 98.3  F (36.8  C) (Oral)  Ht 1.778 m (5' 10\")  Wt 93.3 kg (205 lb 11.2 oz)  SpO2 100%  BMI 29.51 kg/m2 Estimated body mass index is 29.51 kg/(m^2) as calculated from the following:    Height as of this encounter: 1.778 m (5' 10\").    Weight as of this encounter: 93.3 kg (205 lb 11.2 oz).  Medication Reconciliation: complete   Sofía Enriquez. CMA    "

## 2017-04-18 NOTE — MR AVS SNAPSHOT
After Visit Summary   4/18/2017    Cricket Chen    MRN: [id number]           Patient Information     Date Of Birth          1980        Visit Information        Provider Department      4/18/2017 10:30 AM Jake Sidhu MD [facility]        Today's Diagnoses     Secondary renal hyperparathyroidism (H)    -  1    Vitamin D deficiency        Kidney replaced by transplant        Aftercare following organ transplant        Post-transplant lymphoproliferative disorder (H)        Immunosuppressed status (H)        Liver replaced by transplant (H)           Follow-ups after your visit        Follow-up notes from your care team     Return in about 3 months (around 7/18/2017).      Your next 10 appointments already scheduled     May 17, 2017  8:00 AM CDT   [facility] Draw with  [facility] DRAW   [facility]    [address] [place] [id number]   [phone]            May 17, 2017  8:40 AM CDT   (Arrive by 8:25 AM)   Return Visit with RASHI Sanchez [facility] ([facility])    [address] [place] [address]   [phone]            May 17, 2017  9:30 AM CDT   Infusion 360 with UC ONCOLOGY INFUSION, UC 22 ATC   [facility] ([facility])    [address] [place] [address]   [phone]            Jun 07, 2017 10:30 AM CDT   [facility] Lab Draw with UC [facility] LAB DRAW   [facility]    [address] [place] [address]   [phone]            Jun 07, 2017 11:10 AM CDT   (Arrive by 10:55 AM)   Return Visit with RASHI Sanchez [facility] ([facility])    [address] [place] [address]   [phone]             Jun 07, 2017 12:00 PM CDT   Infusion 360 with UC ONCOLOGY INFUSION, UC 15 ATC   [facility] ([facility])    [address]  [address]  [place] [address] [phone]            Jun 26, 2017 11:00 AM CDT   Lab with UC LAB   [facility] ([facility])    [address]  [address] Floor  [place] [address] [phone]            Jun 26, 2017 11:50 AM CDT   (Arrive by 11:35 AM)   Return Liver Transplant with Laureen Galvan MD   [facility] ([facility])    [address]  3rd Floor  [place] [address] [phone]              Who to contact     If you have questions or need follow up information about today's clinic visit or your schedule please contact [facility] NE[facility] directly at [phone].  Normal or non-critical lab and imaging results will be communicated to you by [employer], letter or phone within 4 business days after the clinic has received the results. If you do not hear from us within 7 days, please contact the clinic through [email or web address] or phone. If you have a critical or abnormal lab result, we will notify you by phone as soon as possible.  Submit refill requests through [employer] or call your pharmacy and they will forward the refill request to us. Please allow 3 business days for your refill to be completed.          Additional Information About Your Visit        [employer] Information     [employer] gives you secure access to your electronic health record. If you see a primary care provider, you can also send messages to your care team and make appointments. If you have questions, please call your primary care clinic.  If you do not have a primary care provider, please call [phone] and they will assist you.        Care EveryWhere ID     This is your Care EveryWhere ID. This could be used by other organizations to access your [facility]  "records  [id number]        Your Vitals Were     Pulse Temperature Height Pulse Oximetry BMI (Body Mass Index)       71 98.3  F (36.8  C) (Oral) 1.778 m (5' 10\") 100% 29.51 kg/m2        Blood Pressure from Last 3 Encounters:   04/29/17 138/73   04/26/17 128/77   04/26/17 128/77    Weight from Last 3 Encounters:   04/26/17 94 kg (207 lb 4.8 oz)   04/26/17 94 kg (207 lb 4.8 oz)   04/18/17 93.3 kg (205 lb 11.2 oz)               Primary Care Provider Office Phone # Fax #    Eugenia Perez [phone]       [address] [address]        Thank you!     Thank you for choosing [facility]ROLOG[facility]  for your care. Our goal is always to provide you with excellent care. Hearing back from our patients is one way we can continue to improve our services. Please take a few minutes to complete the written survey that you may receive in the mail after your visit with us. Thank you!             Your Updated Medication List - Protect others around you: Learn how to safely use, store and throw away your medicines at www.disposemymeds.org.          This list is accurate as of: 4/18/17 11:59 PM.  Always use your most recent med list.                   Brand Name Dispense Instructions for use    amLODIPine 10 MG tablet    NORVASC    90 tablet    Take 1 tablet (10 mg) by mouth daily       etoposide 50 MG capsule CHEMO     8 capsule    Take 4 capsules (200 mg) by mouth daily for 2 days Day 2 and 3.       lisinopril 5 MG tablet    PRINIVIL/ZESTRIL    90 tablet    Take 1 tablet (5 mg) by mouth daily       LORazepam 0.5 MG tablet    ATIVAN    30 tablet    Take 1 tablet (0.5 mg) by mouth every 4 hours as needed (Anxiety, Nausea/Vomiting or Sleep)       magnesium oxide 400 (241.3 MG) MG tablet    MAG-OX    120 tablet    Take 2 tablets (800 mg) by mouth 2 times daily       predniSONE 10 MG tablet    DELTASONE    21 tablet    Take 1 tablet (10 mg) by mouth daily       prochlorperazine " 10 MG tablet    COMPAZINE    30 tablet    Take 1 tablet (10 mg) by mouth every 6 hours as needed (Nausea/Vomiting)       tacrolimus capsule     14 capsule    Take 1 capsule (1 mg) by mouth 2 times daily for 7 days       traMADol 50 MG tablet    ULTRAM    45 tablet    Take 1 tablet (50 mg) by mouth every 8 hours as needed for moderate pain       vitamin D 2000 UNITS tablet     100 tablet    Take 2,000 Units by mouth daily

## 2017-04-26 ENCOUNTER — INFUSION THERAPY VISIT (OUTPATIENT)
Dept: ONCOLOGY | Facility: CLINIC | Age: 37
End: 2017-04-26
Attending: INTERNAL MEDICINE
Payer: COMMERCIAL

## 2017-04-26 VITALS
HEIGHT: 70 IN | TEMPERATURE: 98.3 F | HEART RATE: 77 BPM | SYSTOLIC BLOOD PRESSURE: 128 MMHG | DIASTOLIC BLOOD PRESSURE: 77 MMHG | RESPIRATION RATE: 16 BRPM | WEIGHT: 207.3 LBS | BODY MASS INDEX: 29.68 KG/M2 | OXYGEN SATURATION: 98 %

## 2017-04-26 VITALS
SYSTOLIC BLOOD PRESSURE: 128 MMHG | WEIGHT: 207.3 LBS | OXYGEN SATURATION: 98 % | HEART RATE: 77 BPM | RESPIRATION RATE: 16 BRPM | DIASTOLIC BLOOD PRESSURE: 77 MMHG | BODY MASS INDEX: 29.74 KG/M2 | TEMPERATURE: 98.3 F

## 2017-04-26 DIAGNOSIS — D47.Z1 POST-TRANSPLANT LYMPHOPROLIFERATIVE DISORDER (H): Primary | ICD-10-CM

## 2017-04-26 DIAGNOSIS — Z94.4 LIVER REPLACED BY TRANSPLANT (H): ICD-10-CM

## 2017-04-26 DIAGNOSIS — G47.09 OTHER INSOMNIA: ICD-10-CM

## 2017-04-26 DIAGNOSIS — Z51.89 ENCOUNTER FOR OTHER SPECIFIED AFTERCARE: ICD-10-CM

## 2017-04-26 DIAGNOSIS — M62.830 BACK MUSCLE SPASM: ICD-10-CM

## 2017-04-26 LAB
ALBUMIN SERPL-MCNC: 3.7 G/DL (ref 3.4–5)
ALBUMIN SERPL-MCNC: 3.8 G/DL (ref 3.4–5)
ALP SERPL-CCNC: 83 U/L (ref 40–150)
ALP SERPL-CCNC: 85 U/L (ref 40–150)
ALT SERPL W P-5'-P-CCNC: 23 U/L (ref 0–70)
ALT SERPL W P-5'-P-CCNC: 24 U/L (ref 0–70)
ANION GAP SERPL CALCULATED.3IONS-SCNC: 7 MMOL/L (ref 3–14)
AST SERPL W P-5'-P-CCNC: 13 U/L (ref 0–45)
AST SERPL W P-5'-P-CCNC: 14 U/L (ref 0–45)
BASOPHILS # BLD AUTO: 0 10E9/L (ref 0–0.2)
BASOPHILS NFR BLD AUTO: 0.5 %
BILIRUB DIRECT SERPL-MCNC: 0.1 MG/DL (ref 0–0.2)
BILIRUB DIRECT SERPL-MCNC: 0.1 MG/DL (ref 0–0.2)
BILIRUB SERPL-MCNC: 0.8 MG/DL (ref 0.2–1.3)
BILIRUB SERPL-MCNC: 0.8 MG/DL (ref 0.2–1.3)
BUN SERPL-MCNC: 15 MG/DL (ref 7–30)
CALCIUM SERPL-MCNC: 8.8 MG/DL (ref 8.5–10.1)
CHLORIDE SERPL-SCNC: 107 MMOL/L (ref 94–109)
CO2 SERPL-SCNC: 25 MMOL/L (ref 20–32)
CREAT SERPL-MCNC: 1.12 MG/DL (ref 0.66–1.25)
DIFFERENTIAL METHOD BLD: ABNORMAL
EOSINOPHIL # BLD AUTO: 0 10E9/L (ref 0–0.7)
EOSINOPHIL NFR BLD AUTO: 0.5 %
ERYTHROCYTE [DISTWIDTH] IN BLOOD BY AUTOMATED COUNT: 14 % (ref 10–15)
GFR SERPL CREATININE-BSD FRML MDRD: 74 ML/MIN/1.7M2
GLUCOSE SERPL-MCNC: 89 MG/DL (ref 70–99)
HCT VFR BLD AUTO: 39.5 % (ref 40–53)
HGB BLD-MCNC: 13.2 G/DL (ref 13.3–17.7)
IMM GRANULOCYTES # BLD: 0 10E9/L (ref 0–0.4)
IMM GRANULOCYTES NFR BLD: 0.7 %
LYMPHOCYTES # BLD AUTO: 0.4 10E9/L (ref 0.8–5.3)
LYMPHOCYTES NFR BLD AUTO: 6.5 %
MAGNESIUM SERPL-MCNC: 2.4 MG/DL (ref 1.6–2.3)
MCH RBC QN AUTO: 30.9 PG (ref 26.5–33)
MCHC RBC AUTO-ENTMCNC: 33.4 G/DL (ref 31.5–36.5)
MCV RBC AUTO: 93 FL (ref 78–100)
MONOCYTES # BLD AUTO: 0.6 10E9/L (ref 0–1.3)
MONOCYTES NFR BLD AUTO: 9.5 %
NEUTROPHILS # BLD AUTO: 4.8 10E9/L (ref 1.6–8.3)
NEUTROPHILS NFR BLD AUTO: 82.3 %
NRBC # BLD AUTO: 0 10*3/UL
NRBC BLD AUTO-RTO: 0 /100
PHOSPHATE SERPL-MCNC: 3 MG/DL (ref 2.5–4.5)
PLATELET # BLD AUTO: 123 10E9/L (ref 150–450)
POTASSIUM SERPL-SCNC: 3.8 MMOL/L (ref 3.4–5.3)
PROT SERPL-MCNC: 6.7 G/DL (ref 6.8–8.8)
PROT SERPL-MCNC: 6.8 G/DL (ref 6.8–8.8)
RBC # BLD AUTO: 4.27 10E12/L (ref 4.4–5.9)
SODIUM SERPL-SCNC: 139 MMOL/L (ref 133–144)
WBC # BLD AUTO: 5.9 10E9/L (ref 4–11)

## 2017-04-26 PROCEDURE — 25000128 H RX IP 250 OP 636: Mod: ZF | Performed by: PHYSICIAN ASSISTANT

## 2017-04-26 PROCEDURE — 83735 ASSAY OF MAGNESIUM: CPT | Performed by: INTERNAL MEDICINE

## 2017-04-26 PROCEDURE — 84100 ASSAY OF PHOSPHORUS: CPT | Performed by: INTERNAL MEDICINE

## 2017-04-26 PROCEDURE — 25000132 ZZH RX MED GY IP 250 OP 250 PS 637: Mod: ZF | Performed by: PHYSICIAN ASSISTANT

## 2017-04-26 PROCEDURE — 80076 HEPATIC FUNCTION PANEL: CPT | Performed by: INTERNAL MEDICINE

## 2017-04-26 PROCEDURE — 25000128 H RX IP 250 OP 636: Mod: ZF | Performed by: INTERNAL MEDICINE

## 2017-04-26 PROCEDURE — 96415 CHEMO IV INFUSION ADDL HR: CPT

## 2017-04-26 PROCEDURE — 80053 COMPREHEN METABOLIC PANEL: CPT | Performed by: INTERNAL MEDICINE

## 2017-04-26 PROCEDURE — 96417 CHEMO IV INFUS EACH ADDL SEQ: CPT

## 2017-04-26 PROCEDURE — 25000125 ZZHC RX 250: Mod: ZF | Performed by: PHYSICIAN ASSISTANT

## 2017-04-26 PROCEDURE — 96413 CHEMO IV INFUSION 1 HR: CPT

## 2017-04-26 PROCEDURE — 99212 OFFICE O/P EST SF 10 MIN: CPT | Mod: ZF

## 2017-04-26 PROCEDURE — 82248 BILIRUBIN DIRECT: CPT | Performed by: INTERNAL MEDICINE

## 2017-04-26 PROCEDURE — 99214 OFFICE O/P EST MOD 30 MIN: CPT | Mod: ZP | Performed by: PHYSICIAN ASSISTANT

## 2017-04-26 PROCEDURE — 96367 TX/PROPH/DG ADDL SEQ IV INF: CPT

## 2017-04-26 PROCEDURE — 96375 TX/PRO/DX INJ NEW DRUG ADDON: CPT

## 2017-04-26 PROCEDURE — 96411 CHEMO IV PUSH ADDL DRUG: CPT

## 2017-04-26 PROCEDURE — 85025 COMPLETE CBC W/AUTO DIFF WBC: CPT | Performed by: INTERNAL MEDICINE

## 2017-04-26 RX ORDER — METHYLPREDNISOLONE SODIUM SUCCINATE 125 MG/2ML
125 INJECTION, POWDER, LYOPHILIZED, FOR SOLUTION INTRAMUSCULAR; INTRAVENOUS
Status: CANCELLED
Start: 2017-04-26

## 2017-04-26 RX ORDER — PREDNISONE 50 MG/1
50 TABLET ORAL 2 TIMES DAILY
Qty: 10 TABLET | Refills: 0 | Status: SHIPPED | OUTPATIENT
Start: 2017-04-26 | End: 2017-05-01

## 2017-04-26 RX ORDER — EPINEPHRINE 0.3 MG/.3ML
INJECTION SUBCUTANEOUS
Status: DISCONTINUED
Start: 2017-04-26 | End: 2017-04-26 | Stop reason: WASHOUT

## 2017-04-26 RX ORDER — LORAZEPAM 2 MG/ML
0.5 INJECTION INTRAMUSCULAR EVERY 4 HOURS PRN
Status: CANCELLED
Start: 2017-04-26

## 2017-04-26 RX ORDER — HEPARIN SODIUM (PORCINE) LOCK FLUSH IV SOLN 100 UNIT/ML 100 UNIT/ML
5 SOLUTION INTRAVENOUS EVERY 8 HOURS
Status: DISCONTINUED | OUTPATIENT
Start: 2017-04-26 | End: 2017-04-26 | Stop reason: HOSPADM

## 2017-04-26 RX ORDER — PALONOSETRON 0.05 MG/ML
0.25 INJECTION, SOLUTION INTRAVENOUS ONCE
Status: CANCELLED
Start: 2017-04-26

## 2017-04-26 RX ORDER — EPINEPHRINE 0.3 MG/.3ML
0.3 INJECTION SUBCUTANEOUS EVERY 5 MIN PRN
Status: CANCELLED | OUTPATIENT
Start: 2017-04-26

## 2017-04-26 RX ORDER — SODIUM CHLORIDE 9 MG/ML
1000 INJECTION, SOLUTION INTRAVENOUS CONTINUOUS PRN
Status: CANCELLED
Start: 2017-04-26

## 2017-04-26 RX ORDER — ACETAMINOPHEN 325 MG/1
650 TABLET ORAL ONCE
Status: CANCELLED
Start: 2017-04-26 | End: 2017-04-26

## 2017-04-26 RX ORDER — HEPARIN SODIUM (PORCINE) LOCK FLUSH IV SOLN 100 UNIT/ML 100 UNIT/ML
5 SOLUTION INTRAVENOUS EVERY 8 HOURS
Status: CANCELLED
Start: 2017-04-26

## 2017-04-26 RX ORDER — DIPHENHYDRAMINE HCL 25 MG
50 CAPSULE ORAL ONCE
Status: CANCELLED
Start: 2017-04-26 | End: 2017-04-26

## 2017-04-26 RX ORDER — TRAZODONE HYDROCHLORIDE 50 MG/1
25-100 TABLET, FILM COATED ORAL AT BEDTIME
Qty: 30 TABLET | Refills: 1 | Status: SHIPPED | OUTPATIENT
Start: 2017-04-26 | End: 2017-11-09

## 2017-04-26 RX ORDER — ALBUTEROL SULFATE 0.83 MG/ML
2.5 SOLUTION RESPIRATORY (INHALATION)
Status: CANCELLED | OUTPATIENT
Start: 2017-04-26

## 2017-04-26 RX ORDER — CYCLOBENZAPRINE HCL 5 MG
5-10 TABLET ORAL 3 TIMES DAILY PRN
Qty: 30 TABLET | Refills: 3 | Status: SHIPPED | OUTPATIENT
Start: 2017-04-26 | End: 2017-07-20

## 2017-04-26 RX ORDER — ETOPOSIDE 50 MG/1
100 CAPSULE ORAL DAILY
Qty: 8 CAPSULE | Refills: 0 | Status: SHIPPED | OUTPATIENT
Start: 2017-04-27 | End: 2017-04-29

## 2017-04-26 RX ORDER — PALONOSETRON 0.05 MG/ML
0.25 INJECTION, SOLUTION INTRAVENOUS ONCE
Status: COMPLETED | OUTPATIENT
Start: 2017-04-26 | End: 2017-04-26

## 2017-04-26 RX ORDER — ACETAMINOPHEN 325 MG/1
650 TABLET ORAL ONCE
Status: COMPLETED | OUTPATIENT
Start: 2017-04-26 | End: 2017-04-26

## 2017-04-26 RX ORDER — MEPERIDINE HYDROCHLORIDE 25 MG/ML
25 INJECTION INTRAMUSCULAR; INTRAVENOUS; SUBCUTANEOUS
Status: CANCELLED
Start: 2017-04-26

## 2017-04-26 RX ORDER — ALBUTEROL SULFATE 90 UG/1
1-2 AEROSOL, METERED RESPIRATORY (INHALATION)
Status: CANCELLED
Start: 2017-04-26

## 2017-04-26 RX ORDER — MEPERIDINE HYDROCHLORIDE 25 MG/ML
25 INJECTION INTRAMUSCULAR; INTRAVENOUS; SUBCUTANEOUS EVERY 30 MIN PRN
Status: CANCELLED | OUTPATIENT
Start: 2017-04-26

## 2017-04-26 RX ORDER — HEPARIN SODIUM (PORCINE) LOCK FLUSH IV SOLN 100 UNIT/ML 100 UNIT/ML
500 SOLUTION INTRAVENOUS EVERY 8 HOURS
Status: DISCONTINUED | OUTPATIENT
Start: 2017-04-26 | End: 2017-05-04 | Stop reason: HOSPADM

## 2017-04-26 RX ORDER — DIPHENHYDRAMINE HYDROCHLORIDE 50 MG/ML
50 INJECTION INTRAMUSCULAR; INTRAVENOUS
Status: CANCELLED
Start: 2017-04-26

## 2017-04-26 RX ADMIN — VINCRISTINE SULFATE 2 MG: 1 INJECTION, SOLUTION INTRAVENOUS at 15:48

## 2017-04-26 RX ADMIN — SODIUM CHLORIDE 500 ML: 9 INJECTION, SOLUTION INTRAVENOUS at 12:00

## 2017-04-26 RX ADMIN — DIPHENHYDRAMINE HYDROCHLORIDE 50 MG: 50 INJECTION, SOLUTION INTRAMUSCULAR; INTRAVENOUS at 12:00

## 2017-04-26 RX ADMIN — ACETAMINOPHEN 650 MG: 325 TABLET ORAL at 11:59

## 2017-04-26 RX ADMIN — SODIUM CHLORIDE, PRESERVATIVE FREE 500 UNITS: 5 INJECTION INTRAVENOUS at 10:02

## 2017-04-26 RX ADMIN — PALONOSETRON HYDROCHLORIDE 0.25 MG: 0.25 INJECTION INTRAVENOUS at 12:17

## 2017-04-26 RX ADMIN — SODIUM CHLORIDE, PRESERVATIVE FREE 5 ML: 5 INJECTION INTRAVENOUS at 17:40

## 2017-04-26 RX ADMIN — CYCLOPHOSPHAMIDE 1650 MG: 2 INJECTION, POWDER, FOR SOLUTION INTRAVENOUS; ORAL at 15:53

## 2017-04-26 RX ADMIN — ETOPOSIDE 110 MG: 20 INJECTION, SOLUTION, CONCENTRATE INTRAVENOUS at 16:45

## 2017-04-26 RX ADMIN — SODIUM CHLORIDE 150 MG: 9 INJECTION, SOLUTION INTRAVENOUS at 12:17

## 2017-04-26 RX ADMIN — RITUXIMAB 800 MG: 10 INJECTION, SOLUTION INTRAVENOUS at 12:44

## 2017-04-26 ASSESSMENT — PAIN SCALES - GENERAL: PAINLEVEL: NO PAIN (0)

## 2017-04-26 NOTE — NURSING NOTE
Chief Complaint   Patient presents with     Port Draw     blood collected from port. port accessed with 20 gauge 3/4 inch gripper needle. line flushed with heparin. vitals taken     Essence Boucher RN

## 2017-04-26 NOTE — PATIENT INSTRUCTIONS
BONNIE Alberto message to scheduling:  Krystina/labs/R-CEOP in 3 and 6 weeks   Oracio/labs 7/6   PET/CT 7/5    Contact Numbers  [facility]: [phone]    After Hours:  [phone]  Triage: [phone]    Please call the [facility] Triage line if you experience a temperature greater than or equal to 100.5, shaking chills, have uncontrolled nausea, vomiting and/or diarrhea, dizziness, shortness of breath, chest pain, bleeding, unexplained bruising, or if you have any other new/concerning symptoms, questions or concerns.     If it is after hours, weekends, or holidays, please call the main hospital  at  [phone] and ask to speak to the Oncology doctor on call.     If you are having any concerning symptoms or wish to speak to a provider before your next infusion visit, please call your care coordinator or triage to notify them so we can adequately serve you.     If you need a refill on a narcotic prescription or other medication, please call triage before your infusion appointment.         April 2017 Sunday Monday Tuesday Wednesday Thursday Friday Saturday                                 1       2     3     Outpatient Visit   11:46 AM   [facility]     IR CHEST PORT PLACEMENT >5 YRS   12:00 PM   (60 min.)   [id number]   [facility]     INSERT PORT VASCULAR ACCESS    1:30 PM   Rajendra Jacques PA-C    OR 4     5     [facility]    6:45 AM   (15 min.)    [facility]   [facility]     [facility] ONC INFUSION 360    7:00 AM   (360 min.)    ONCOLOGY INFUSION   [facility] 6     7     8     [facility] ONC INFUSION 30   11:00 AM   (30 min.)    ONCOLOGY INFUSION   [facility]   9     10     11     12     [facility] D[facility]   11:15 AM   (15 min.)    [facility] DRAW   [facility] Draw 13     14     15       16     17     18     [facility] D[facility]    9:45 AM   (15 min.)   [facility]  DRAW   [facility]     [facility] RETURN KIDNEY TRANSPLANT   10:00 AM   (25 min.)   Jake Sidhu MD   [facility] Nephrology 19     20     21     22       23     24     25     26     [facility] D[facility]    9:45 AM   (15 min.)    [facility] DRAW   [facility] Draw     [facility] RETURN   10:05 AM   (50 min.)   Krystina Durham PA-C   [facility]     [facility] ONC INFUSION 360   11:30 AM   (360 min.)   [facility]COLOGY INFUSION   [facility] 27     28     29     [facility] INJECTION   12:45 PM   (15 min.)   Nurse,  Oncology Injection   [facility]   30                                              May 2017   Andre Monday Tuesday Wednesday Thursday Friday Saturday        1     2     3     4     5     6       7     8     9     10     11     12     13       14     15     16     17     [facility] D[facility]    8:00 AM   (15 min.)    [facility] DRAW   [facility] Draw     [facility] RETURN    8:25 AM   (50 min.)   Krystina Durham PA-C   [facility]    9:30 AM   (360 min.)    ONCOLOGY INFUSION   [facility] 18     19     20       21     22     23     24     25     26     27       28     29     30     31                                Recent Results (from the past 24 hour(s))   CBC with platelets differential    Collection Time: 04/26/17 10:10 AM   Result Value Ref Range    WBC 5.9 4.0 - 11.0 10e9/L    RBC Count 4.27 (L) 4.4 - 5.9 10e12/L    Hemoglobin 13.2 (L) 13.3 - 17.7 g/dL    Hematocrit 39.5 (L) 40.0 - 53.0 %    MCV 93 78 - 100 fl    MCH 30.9 26.5 - 33.0 pg    MCHC 33.4 31.5 - 36.5 g/dL    RDW 14.0 10.0 - 15.0 %    Platelet Count 123 (L) 150 - 450 10e9/L    Diff Method Automated Method     % Neutrophils 82.3 %    % Lymphocytes 6.5 %    % Monocytes 9.5 %    % Eosinophils 0.5 %    % Basophils 0.5 %    % Immature Granulocytes 0.7 %    Nucleated RBCs 0 0 /100    Absolute Neutrophil 4.8  1.6 - 8.3 10e9/L    Absolute Lymphocytes 0.4 (L) 0.8 - 5.3 10e9/L    Absolute Monocytes 0.6 0.0 - 1.3 10e9/L    Absolute Eosinophils 0.0 0.0 - 0.7 10e9/L    Absolute Basophils 0.0 0.0 - 0.2 10e9/L    Abs Immature Granulocytes 0.0 0 - 0.4 10e9/L    Absolute Nucleated RBC 0.0    Comprehensive metabolic panel    Collection Time: 04/26/17 10:10 AM   Result Value Ref Range    Sodium 139 133 - 144 mmol/L    Potassium 3.8 3.4 - 5.3 mmol/L    Chloride 107 94 - 109 mmol/L    Carbon Dioxide 25 20 - 32 mmol/L    Anion Gap 7 3 - 14 mmol/L    Glucose 89 70 - 99 mg/dL    Urea Nitrogen 15 7 - 30 mg/dL    Creatinine 1.12 0.66 - 1.25 mg/dL    GFR Estimate 74 >60 mL/min/1.7m2    GFR Estimate If Black 89 >60 mL/min/1.7m2    Calcium 8.8 8.5 - 10.1 mg/dL    Bilirubin Total 0.8 0.2 - 1.3 mg/dL    Albumin 3.7 3.4 - 5.0 g/dL    Protein Total 6.8 6.8 - 8.8 g/dL    Alkaline Phosphatase 83 40 - 150 U/L    ALT 24 0 - 70 U/L    AST 13 0 - 45 U/L   Magnesium    Collection Time: 04/26/17 10:10 AM   Result Value Ref Range    Magnesium 2.4 (H) 1.6 - 2.3 mg/dL   Phosphorus    Collection Time: 04/26/17 10:10 AM   Result Value Ref Range    Phosphorus 3.0 2.5 - 4.5 mg/dL   Bilirubin direct    Collection Time: 04/26/17 10:10 AM   Result Value Ref Range    Bilirubin Direct 0.1 0.0 - 0.2 mg/dL   Hepatic panel    Collection Time: 04/26/17 10:10 AM   Result Value Ref Range    Bilirubin Direct 0.1 0.0 - 0.2 mg/dL    Bilirubin Total 0.8 0.2 - 1.3 mg/dL    Albumin 3.8 3.4 - 5.0 g/dL    Protein Total 6.7 (L) 6.8 - 8.8 g/dL    Alkaline Phosphatase 85 40 - 150 U/L    ALT 23 0 - 70 U/L    AST 14 0 - 45 U/L

## 2017-04-26 NOTE — Clinical Note
4/26/2017       RE: Cricket Chen  [address]  [facility] [address]     Dear Colleague,    Thank you for referring your patient, Cricket Chen, to the [facility]. Please see a copy of my visit note below.    Oncology/Hematology Visit Note  Apr 26, 2017    Reason for Visit: follow up of stage III PTLD EBV-positive monomorphic DLBCL    History of Present Illness: Cricket Chen is a 36 year old male with stage III PTLD EBV-positive monomorphic DLBCL. He has a prior history of end-stage liver disease and kidney disease, status post combined liver/kidney transplant back in 05/2016. He was maintained on immunosuppression with tacrolimus and CellCept, and was feeling well up until 12/2016 when he started to experience excessive fatigue and had noticed the emergence of a lump in his right neck. He was subsequently evaluated in January 2017 for persistent right neck lymphadenopathy and underwent a fine needle aspiration, which was largely nondiagnostic. He subsequently followed with excisional lymph node biopsy on 02/01/17, which was diagnostic for monomorphic PTLD/EBV-positive/diffuse large B-cell lymphoma of activated B-cell type, by Tejinder criteria (negative for CD10 and positive for MUM-1). The patient reported persistent fatigue, but no recent weight loss, fevers or drenching night sweats. He had noticed that the lump in the neck subsided after an excisional lymph node biopsy, but still persisted to some extent.       The patient also underwent reduction of immunosuppression with decrease in the dose of his tacrolimus from 4 mg in the morning and 3 mg in the evening to 3 mg b.i.d. His CellCept was also decreased from 750 mg twice a day to 250 mg twice a day.       He underwent a diagnostic PET/CT scan on 2/4/17 and bone marrow biopsy. His PET scan overall demonstrated relatively low burden of disease with no PET-active area in the neck, except from the thyroid gland. The patient was  also found to have PET-avid right axillary lymphadenopathy measuring 1.3 cm.       There was no evidence of FDG-avid areas throughout the rest of his body. He also underwent a diagnostic bone marrow biopsy, which was reported with no morphologic or immunophenotypic evidence of PTLD. FNA of the thyroid lesion on 3/16/17 showed papillary thyroid carcinoma. He started with 4 weeks doses of Rituxan on 2/10/17-3/3/17. CT neck/CAP on 3/28/17 showed a partial response. Therefore, he started on treatment with R-CEOP on 4/5/17. He was not given R-CHOP due to low ejection fraction of 30-35%. He had a port placed on 4/3/17. Please see previous notes for further details on the patient's history. He comes in today for routine follow up prior to cycle 2 R-CEOP.    Interval History:      Review of Systems:  Patient denies any of the following except if noted above: fevers, chills, difficulty with energy, vision or hearing changes, chest pain, dyspnea, abdominal pain, nausea, vomiting, diarrhea, constipation, urinary concerns, headaches, numbness, tingling, issues with sleep or mood. He also denies lumps, bumps, rashes or skin lesions, bleeding or bruising issues.    Current Outpatient Prescriptions   Medication Sig Dispense Refill     etoposide 50 MG capsule CHEMO Take 4 capsules (200 mg) by mouth daily for 2 days Day 2 and 3. 8 capsule 0     PROGRAF 1 MG PO CAPSULE Take 1 capsule (1 mg) by mouth 2 times daily for 7 days 14 capsule 0     predniSONE (DELTASONE) 10 MG tablet Take 1 tablet (10 mg) by mouth daily 21 tablet 3     LORazepam (ATIVAN) 0.5 MG tablet Take 1 tablet (0.5 mg) by mouth every 4 hours as needed (Anxiety, Nausea/Vomiting or Sleep) 30 tablet 5     prochlorperazine (COMPAZINE) 10 MG tablet Take 1 tablet (10 mg) by mouth every 6 hours as needed (Nausea/Vomiting) 30 tablet 5     traMADol (ULTRAM) 50 MG tablet Take 1 tablet (50 mg) by mouth every 8 hours as needed for moderate pain 45 tablet 0     Cholecalciferol  (VITAMIN D) 2000 UNITS tablet Take 2,000 Units by mouth daily 100 tablet 3     magnesium oxide (MAG-OX) 400 (241.3 MG) MG tablet Take 2 tablets (800 mg) by mouth 2 times daily 120 tablet 3     amLODIPine (NORVASC) 10 MG tablet Take 1 tablet (10 mg) by mouth daily 90 tablet 3     lisinopril (PRINIVIL,ZESTRIL) 5 MG tablet Take 1 tablet (5 mg) by mouth daily 90 tablet 3     sulfamethoxazole-trimethoprim (BACTRIM,SEPTRA) 400-80 MG per tablet Take 1 tablet by mouth daily 30 tablet 11       Physical Examination:  General: The patient is a pleasant male in no acute distress.  There were no vitals taken for this visit.  Wt Readings from Last 10 Encounters:   04/18/17 93.3 kg (205 lb 11.2 oz)   04/05/17 93.6 kg (206 lb 6.4 oz)   04/03/17 90.7 kg (200 lb)   03/30/17 93.6 kg (206 lb 5.6 oz)   03/02/17 91.3 kg (201 lb 3.2 oz)   02/25/17 90.8 kg (200 lb 1.6 oz)   02/24/17 91.1 kg (200 lb 13.4 oz)   02/17/17 89.9 kg (198 lb 1.6 oz)   02/09/17 89.4 kg (197 lb 1.5 oz)   02/07/17 89.2 kg (196 lb 11.2 oz)   HEENT: EOMI, PERRL. Sclerae are anicteric. Oral mucosa is pink and moist with no lesions or thrush.   Lymph: Neck is supple with no lymphadenopathy in the cervical or supraclavicular areas.   Heart: Regular rate and rhythm.   Lungs: Clear to auscultation bilaterally.   Abdomen: Bowel sounds present, soft, nontender with no palpable hepatosplenomegaly or masses.   Extremities: No lower extremity edema noted bilaterally.   Neuro: Cranial nerves II through XII are grossly intact.  Skin: No rashes, petechiae, or bruising noted on exposed skin.    Laboratory Data:  No results found for this or any previous visit (from the past 24 hour(s)).    Assessment and Plan:  1. Stage III PTLD EBV-positive monomorphic DLBCL. Patient received 4 weekly doses of Rituxan with a partial response. He then started on R-CEOP with Neulasta support on 4/5/17. Etoposide is given IV on day 1 and oral on days 2 and 3. He tolerated this fairly well with some  back pain associated with Neulasta. He will continue with cycle 2 today. He will follow up with me We are planning on 4 cycles of R-CEOP.    2. Papillary thyroid carcinoma. Will defer surgical resection until after completion of 4 cycles of R-CEOP.     3. History of kidney and liver transplant. No current concerns for rejection. Remains on prednisone 10 mg daily. He is off Prograf and Cellcept due to chemotherapy.     4. EBV viremia. Resolved after 4 weekly doses of Rituxan. Last level on 3/8/17 undetectable.     5. Hypertension. BP is under good control. He remains on amlodipine 10 mg qhs and lisinopril 5 mg daily.     Krystina Durham PA-C  [facility]  [address], [place] [address] [phone]      Again, thank you for allowing me to participate in the care of your patient.      Sincerely,    Krystina Durham PA-C

## 2017-04-26 NOTE — NURSING NOTE
"Oncology Rooming Note    April 26, 2017 11:09 AM   Cricket Chen is a 52 year old male who presents for: Oncology Clinic Visit    Initial Vitals: /77  Pulse 77  Temp 98.3  F (36.8  C) (Oral)  Resp 16  Ht 1.778 m (5' 10\")  Wt 94 kg (207 lb 4.8 oz)  SpO2 98%  BMI 29.74 kg/m2 Estimated body mass index is 29.74 kg/(m^2) as calculated from the following:    Height as of this encounter: 1.778 m (5' 10\").    Weight as of this encounter: 94 kg (207 lb 4.8 oz). Body surface area is 2.15 meters squared.  Data Unavailable Comment: Data Unavailable   No LMP for male patient.  Allergies reviewed: Yes  Medications reviewed: Yes    Medications: Medication refills not needed today.  Pharmacy name entered into EPIC:    [facility] [place], [place] - [address] [place] [address] [phone]  [facility] - [place], [place] - [address] [place] [address]    Clinical concerns:No new concerns Provider was notified.    7 minutes for nursing intake (face to face time)     Lizette Zimmer MA                "

## 2017-04-26 NOTE — PROGRESS NOTES
Oncology/Hematology Visit Note  Apr 26, 2017    Reason for Visit: follow up of stage III PTLD EBV-positive monomorphic DLBCL    History of Present Illness: Cricket Chen is a 36 year old male with stage III PTLD EBV-positive monomorphic DLBCL. He has a prior history of end-stage liver disease and kidney disease, status post combined liver/kidney transplant back in 05/2016. He was maintained on immunosuppression with tacrolimus and CellCept, and was feeling well up until 12/2016 when he started to experience excessive fatigue and had noticed the emergence of a lump in his right neck. He was subsequently evaluated in January 2017 for persistent right neck lymphadenopathy and underwent a fine needle aspiration, which was largely nondiagnostic. He subsequently followed with excisional lymph node biopsy on 02/01/17, which was diagnostic for monomorphic PTLD/EBV-positive/diffuse large B-cell lymphoma of activated B-cell type, by Tejinder criteria (negative for CD10 and positive for MUM-1). The patient reported persistent fatigue, but no recent weight loss, fevers or drenching night sweats. He had noticed that the lump in the neck subsided after an excisional lymph node biopsy, but still persisted to some extent.       The patient also underwent reduction of immunosuppression with decrease in the dose of his tacrolimus from 4 mg in the morning and 3 mg in the evening to 3 mg b.i.d. His CellCept was also decreased from 750 mg twice a day to 250 mg twice a day.       He underwent a diagnostic PET/CT scan on 2/4/17 and bone marrow biopsy. His PET scan overall demonstrated relatively low burden of disease with no PET-active area in the neck, except from the thyroid gland. The patient was also found to have PET-avid right axillary lymphadenopathy measuring 1.3 cm.       There was no evidence of FDG-avid areas throughout the rest of his body. He also underwent a diagnostic bone marrow biopsy, which was reported with no morphologic  or immunophenotypic evidence of PTLD. FNA of the thyroid lesion on 3/16/17 showed papillary thyroid carcinoma. He started with 4 weeks doses of Rituxan on 2/10/17-3/3/17. CT neck/CAP on 3/28/17 showed a partial response. Therefore, he started on treatment with R-CEOP on 4/5/17. He was not given R-CHOP due to low ejection fraction of 30-35%. He had a port placed on 4/3/17. Please see previous notes for further details on the patient's history. He comes in today for routine follow up prior to cycle 2 R-CEOP.    Interval History:  He reports that he generally tolerated the chemotherapy well. He did have one day of pain and muscles spasm in his low back and hips, likely due to Neulasta. He did not get relief from tramadol. He took 2 Ativan and was able to sleep. He has had occasional headaches that resolve on their own without medication. His energy is fair. He is able to do things around the house and run errands. He naps about once/week for about an hour. He chronically sleeps poorly with difficulty falling asleep. Generally, he does not find a lot of relief from Ativan. He previously tried Ambien without relief. He denies any bleeding issues. His hair has started to fall out, so he shaved his head and face. His 17-year-old nephew also shaved his head. He denies other concerns.     Review of Systems:  Patient denies any of the following except if noted above: fevers, chills, vision or hearing changes, chest pain, dyspnea, abdominal pain, nausea, vomiting, diarrhea, constipation, urinary concerns, current headaches, numbness, tingling, or issues with mood.    Current Outpatient Prescriptions   Medication Sig Dispense Refill     traZODone (DESYREL) 50 MG tablet Take 0.5-2 tablets ( mg) by mouth At Bedtime 30 tablet 1     cyclobenzaprine (FLEXERIL) 5 MG tablet Take 1-2 tablets (5-10 mg) by mouth 3 times daily as needed for muscle spasms 30 tablet 3     predniSONE (DELTASONE) 10 MG tablet Take 1 tablet (10 mg) by  "mouth daily 21 tablet 3     LORazepam (ATIVAN) 0.5 MG tablet Take 1 tablet (0.5 mg) by mouth every 4 hours as needed (Anxiety, Nausea/Vomiting or Sleep) 30 tablet 5     prochlorperazine (COMPAZINE) 10 MG tablet Take 1 tablet (10 mg) by mouth every 6 hours as needed (Nausea/Vomiting) 30 tablet 5     traMADol (ULTRAM) 50 MG tablet Take 1 tablet (50 mg) by mouth every 8 hours as needed for moderate pain 45 tablet 0     Cholecalciferol (VITAMIN D) 2000 UNITS tablet Take 2,000 Units by mouth daily 100 tablet 3     magnesium oxide (MAG-OX) 400 (241.3 MG) MG tablet Take 2 tablets (800 mg) by mouth 2 times daily 120 tablet 3     amLODIPine (NORVASC) 10 MG tablet Take 1 tablet (10 mg) by mouth daily 90 tablet 3     lisinopril (PRINIVIL,ZESTRIL) 5 MG tablet Take 1 tablet (5 mg) by mouth daily 90 tablet 3     sulfamethoxazole-trimethoprim (BACTRIM,SEPTRA) 400-80 MG per tablet Take 1 tablet by mouth daily 30 tablet 11     Physical Examination:  General: The patient is a pleasant male in no acute distress.  /77  Pulse 77  Temp 98.3  F (36.8  C) (Oral)  Resp 16  Ht 1.778 m (5' 10\")  Wt 94 kg (207 lb 4.8 oz)  SpO2 98%  BMI 29.74 kg/m2  Wt Readings from Last 10 Encounters:   04/26/17 94 kg (207 lb 4.8 oz)   04/26/17 94 kg (207 lb 4.8 oz)   04/18/17 93.3 kg (205 lb 11.2 oz)   04/05/17 93.6 kg (206 lb 6.4 oz)   04/03/17 90.7 kg (200 lb)   03/30/17 93.6 kg (206 lb 5.6 oz)   03/02/17 91.3 kg (201 lb 3.2 oz)   02/25/17 90.8 kg (200 lb 1.6 oz)   02/24/17 91.1 kg (200 lb 13.4 oz)   02/17/17 89.9 kg (198 lb 1.6 oz)   HEENT: EOMI, PERRL. Sclerae are anicteric. Oral mucosa is pink and moist with no lesions or thrush.   Lymph: Neck is supple with no lymphadenopathy in the cervical or supraclavicular areas.   Heart: Regular rate and rhythm.   Lungs: Clear to auscultation bilaterally.   Abdomen: Bowel sounds present, soft, nontender with no palpable hepatosplenomegaly or masses.   Extremities: No lower extremity edema noted " bilaterally.   Neuro: Cranial nerves II through XII are grossly intact.  Skin: No rashes, petechiae, or bruising noted on exposed skin.    Laboratory Data:   4/26/2017 10:10   Sodium 139   Potassium 3.8   Chloride 107   Carbon Dioxide 25   Urea Nitrogen 15   Creatinine 1.12   GFR Estimate 74   GFR Estimate If Black 89   Calcium 8.8   Anion Gap 7   Magnesium 2.4 (H)   Phosphorus 3.0   Albumin 3.7   Protein Total 6.8   Bilirubin Total 0.8   Alkaline Phosphatase 83   ALT 24   AST 13   Bilirubin Direct 0.1   Glucose 89   WBC 5.9   Hemoglobin 13.2 (L)   Hematocrit 39.5 (L)   Platelet Count 123 (L)   RBC Count 4.27 (L)   MCV 93   MCH 30.9   MCHC 33.4   RDW 14.0   Diff Method Automated Method   % Neutrophils 82.3   % Lymphocytes 6.5   % Monocytes 9.5   % Eosinophils 0.5   % Basophils 0.5   % Immature Granulocytes 0.7   Nucleated RBCs 0   Absolute Neutrophil 4.8   Absolute Lymphocytes 0.4 (L)   Absolute Monocytes 0.6   Absolute Eosinophils 0.0   Absolute Basophils 0.0   Abs Immature Granulocytes 0.0   Absolute Nucleated RBC 0.0     Assessment and Plan:  1. Stage III PTLD EBV-positive monomorphic DLBCL. Patient received 4 weekly doses of Rituxan with a partial response. He then started on R-CEOP with Neulasta support on 4/5/17. Etoposide is given IV on day 1 and oral on days 2 and 3. He tolerated this fairly well with some back and hip pain/spasm associated with Neulasta. He will continue with cycle 2 today. He will follow up with me prior to cycles 3 and 4. We are planning on 4 cycles of R-CEOP. We will repeat a PET/CT about a month after cycle 4.    2. Papillary thyroid carcinoma. Will defer surgical resection until after completion of 4 cycles of R-CEOP.     3. History of kidney and liver transplant. No current concerns for rejection. Remains on prednisone 10 mg daily. He is off Prograf and Cellcept due to chemotherapy.     4. EBV viremia. Resolved after 4 weekly doses of Rituxan. Last level on 3/8/17 undetectable.     5.  Hypertension. BP is under good control. He remains on amlodipine 10 mg qhs and lisinopril 5 mg daily.     6. Back/hip muscle spasm. Will give Flexeril to take if recurs with Neulasta. He will take Claritin again with this round of chemotherapy.    7. Insomnia. Will give a trial with trazodone. Has previously received sleep hygiene handouts.     Krystina Durham PA-C  [facility]  [address] [place], [place] [id number] [phone]

## 2017-04-26 NOTE — MR AVS SNAPSHOT
After Visit Summary   4/26/2017    Cricket Chen    MRN: [id number]           Patient Information     Date Of Birth          1980        Visit Information        Provider Department      4/26/2017 10:20 AM Krystina Durham PA-C [facility]        Today's Diagnoses     Post-transplant lymphoproliferative disorder (H)    -  1    Other insomnia        Back muscle spasm        Encounter for other specified aftercare           Follow-ups after your visit        Your next 10 appointments already scheduled     Apr 26, 2017 11:30 AM CDT   Infusion 360 with UC ONCOLOGY INFUSION, UC 13 ATC   [facility] ([facility])    [address] [place] [id number]   [phone]            May 17, 2017  8:00 AM CDT   [facility] Lab Draw with  MASONIC LAB DRAW   [facility] Draw ([facility])    [address] [place] [address]   [phone]            May 17, 2017  8:40 AM CDT   (Arrive by 8:25 AM)   Return Visit with Krystina Durham PA-C   [facility] ([facility])    [address] [place] [id number]   [phone]            May 17, 2017  9:30 AM CDT   Infusion 360 with UC ONCOLOGY INFUSION, UC 22 ATC   [facility] ([facility])    [address] [place] [address]   [phone]            Jun 07, 2017 10:30 AM CDT   Masonic Lab Draw with UC MASONIC LAB DRAW   [facility] Draw ([facility])    [address] [place] [address]   [phone]            Jun 07, 2017 11:10 AM CDT   (Arrive by 10:55 AM)   Return Visit with Krystina Durham PA-C   [facility] ([facility])    [address] [place] [address]  [phone]            Jun 07, 2017 12:00 PM CDT   Infusion 360 with UC ONCOLOGY INFUSION, UC 15 ATC   [facility] ([facility])    [address] [facility]  [address] [facility] [id number] [phone]              Future tests that were ordered for you today     Open Future Orders        Priority Expected Expires Ordered    NPET Oncology (Eyes to Thighs) Routine 7/5/2017 7/25/2017 4/26/2017            Who to contact     If you have questions or need follow up information about today's clinic visit or your schedule please contact [facility] directly at [phone].  Normal or non-critical lab and imaging results will be communicated to you by [employer], letter or phone within 4 business days after the clinic has received the results. If you do not hear from us within 7 days, please contact the clinic through [email or web address] or phone. If you have a critical or abnormal lab result, we will notify you by phone as soon as possible.  Submit refill requests through [employer] or call your pharmacy and they will forward the refill request to us. Please allow 3 business days for your refill to be completed.          Additional Information About Your Visit        [employer] Information     [employer] gives you secure access to your electronic health record. If you see a primary care provider, you can also send messages to your care team and make appointments. If you have questions, please call your primary care clinic.  If you do not have a primary care provider, please call [phone] and they will assist you.        Care EveryWhere ID     This is your Care EveryWhere ID. This could be used by other organizations to access your [place] medical records  [id number]        Your Vitals Were     Pulse Temperature Respirations Height Pulse Oximetry BMI (Body Mass Index)    77 98.3  F (36.8  C) (Oral) 16 1.778 m (5' 10\") 98% 29.74 kg/m2       Blood Pressure from " Last 3 Encounters:   04/26/17 128/77   04/26/17 128/77   04/18/17 128/81    Weight from Last 3 Encounters:   04/26/17 94 kg (207 lb 4.8 oz)   04/26/17 94 kg (207 lb 4.8 oz)   04/18/17 93.3 kg (205 lb 11.2 oz)                 Today's Medication Changes          These changes are accurate as of: 4/26/17 11:16 AM.  If you have any questions, ask your nurse or doctor.               Start taking these medicines.        Dose/Directions    cyclobenzaprine 5 MG tablet   Commonly known as:  FLEXERIL   Used for:  Back muscle spasm   Started by:  Krystina Durham PA-C        Dose:  5-10 mg   Take 1-2 tablets (5-10 mg) by mouth 3 times daily as needed for muscle spasms   Quantity:  30 tablet   Refills:  3       traZODone 50 MG tablet   Commonly known as:  DESYREL   Used for:  Other insomnia   Started by:  Krystina Durham PA-C        Dose:   mg   Take 0.5-2 tablets ( mg) by mouth At Bedtime   Quantity:  30 tablet   Refills:  1         Stop taking these medicines if you haven't already. Please contact your care team if you have questions.     etoposide 50 MG capsule CHEMO   Stopped by:  Krystina Durham PA-C           tacrolimus capsule   Stopped by:  Krystina Durham PA-C                Where to get your medicines      These medications were sent to [place] [id number] [facility] 1[address]273  [address] [facility] [address] [address]    Hours:  TRANSPLANT PHONE NUMBER [phone] Phone:  [phone]     cyclobenzaprine 5 MG tablet    traZODone 50 MG tablet                Primary Care Provider Office Phone # Fax #    Eugenai Perez [phone]       [address]  [facility] [address]        Thank you!     Thank you for choosing [facility]  for your care. Our goal is always to provide you with excellent care. Hearing back from our patients is one way we can continue to improve our  services. Please take a few minutes to complete the written survey that you may receive in the mail after your visit with us. Thank you!             Your Updated Medication List - Protect others around you: Learn how to safely use, store and throw away your medicines at www.disposemymeds.org.          This list is accurate as of: 4/26/17 11:16 AM.  Always use your most recent med list.                   Brand Name Dispense Instructions for use    amLODIPine 10 MG tablet    NORVASC    90 tablet    Take 1 tablet (10 mg) by mouth daily       cyclobenzaprine 5 MG tablet    FLEXERIL    30 tablet    Take 1-2 tablets (5-10 mg) by mouth 3 times daily as needed for muscle spasms       lisinopril 5 MG tablet    PRINIVIL/ZESTRIL    90 tablet    Take 1 tablet (5 mg) by mouth daily       LORazepam 0.5 MG tablet    ATIVAN    30 tablet    Take 1 tablet (0.5 mg) by mouth every 4 hours as needed (Anxiety, Nausea/Vomiting or Sleep)       magnesium oxide 400 (241.3 MG) MG tablet    MAG-OX    120 tablet    Take 2 tablets (800 mg) by mouth 2 times daily       predniSONE 10 MG tablet    DELTASONE    21 tablet    Take 1 tablet (10 mg) by mouth daily       prochlorperazine 10 MG tablet    COMPAZINE    30 tablet    Take 1 tablet (10 mg) by mouth every 6 hours as needed (Nausea/Vomiting)       sulfamethoxazole-trimethoprim 400-80 MG per tablet    BACTRIM/SEPTRA    30 tablet    Take 1 tablet by mouth daily       traMADol 50 MG tablet    ULTRAM    45 tablet    Take 1 tablet (50 mg) by mouth every 8 hours as needed for moderate pain       traZODone 50 MG tablet    DESYREL    30 tablet    Take 0.5-2 tablets ( mg) by mouth At Bedtime       vitamin D 2000 UNITS tablet     100 tablet    Take 2,000 Units by mouth daily

## 2017-04-26 NOTE — NURSING NOTE
"Oncology Rooming Note    April 26, 2017 10:22 AM   Cricket Chen is a 52 year old male who presents for: Oncology Clinic Visit    Initial Vitals: There were no vitals taken for this visit. Estimated body mass index is 29.74 kg/(m^2) as calculated from the following:    Height as of 4/18/17: 1.778 m (5' 10\").    Weight as of an earlier encounter on 4/26/17: 94 kg (207 lb 4.8 oz). There is no height or weight on file to calculate BSA.  Data Unavailable Comment: Data Unavailable   No LMP for male patient.  Allergies reviewed: No  Medications reviewed: Yes    Medications: Medication refills not needed today.  Pharmacy name entered into [facility]:    [facility] - [place], [place] - [address] [place] [address] [phone]  [facility] - [place], [place] - [address] [place] [address]    Clinical concerns: No new concerns, Provider was notified.    7 minutes for nursing intake (face to face time)     Lizette Zimmer MA                "

## 2017-04-26 NOTE — PROGRESS NOTES
Infusion Nursing Note:  Cricket Chen presents today for C2D1 Rituxan-Etoposide-Vincristine-Cytoxan.    Patient seen by provider today: Yes: BONNIE Alberto    Treatment Conditions:  Lab Results   Component Value Date    HGB 13.2 04/26/2017     Lab Results   Component Value Date    WBC 5.9 04/26/2017      Lab Results   Component Value Date    ANEU 4.8 04/26/2017     Lab Results   Component Value Date     04/26/2017      Lab Results   Component Value Date     04/26/2017                   Lab Results   Component Value Date    POTASSIUM 3.8 04/26/2017           Lab Results   Component Value Date    MAG 2.4 04/26/2017            Lab Results   Component Value Date    CR 1.12 04/26/2017                   Lab Results   Component Value Date    COLBY 8.8 04/26/2017                Lab Results   Component Value Date    BILITOTAL 0.8 04/26/2017    BILITOTAL 0.8 04/26/2017           Lab Results   Component Value Date    ALBUMIN 3.7 04/26/2017    ALBUMIN 3.8 04/26/2017                    Lab Results   Component Value Date    ALT 24 04/26/2017    ALT 23 04/26/2017           Lab Results   Component Value Date    AST 13 04/26/2017    AST 14 04/26/2017     Results reviewed, labs MET treatment parameters, ok to proceed with treatment.    Intravenous Access:  Implanted Port.  Access dc'd at time of discharge.      Note:   Results reviewed, copy given to patient.  Proceed with treatment.    + BR checked and noted per protocol while administering Vincristine in a free flowing NS line.  Copy of AVS given to patient. + Blood return from PORT pre and post infusion.  Tolerated infusion without incident. Trazadone, Flexeril, Etoposide, Prednisone Prescriptions filled today.   D/C in care of self.  Pt will return 4/29 for Neulasta and 5/17 for C3D1 Rituxan-CEOP.       Kasey Rowe, VAL    Administrations This Visit     cyclophosphamide (CYTOXAN) 1,650 mg in NaCl 0.9 % 633 mL CHEMOTHERAPY     Admin Date Action Dose Rate  Route Administered By          04/26/2017 New Bag 1650 mg 633 mL/hr Intravenous Kasey Rowe RN                   etoposide (TOPOSAR) 110 mg in NaCl 0.9 % 556 mL CHEMOTHERAPY     Admin Date Action Dose Rate Route Administered By          04/26/2017 New Bag 110 mg 556 mL/hr Intravenous Kasey Rowe RN                   fosaprepitant (EMEND) 150 mg in NaCl 0.9 % intermittent infusion     Admin Date Action Dose Rate Route Administered By          04/26/2017 New Bag 150 mg 825 mL/hr Intravenous Kasey Rowe RN                   heparin 100 UNIT/ML injection 5 mL     Admin Date Action Dose Route Administered By             04/26/2017 Given 5 mL Intracatheter Kasey Rowe RN                    palonosetron (ALOXI) injection 0.25 mg     Admin Date Action Dose Route Administered By             04/26/2017 Given 0.25 mg Intravenous Kasey Rowe RN                    riTUXimab (RITUXAN) 800 mg in NaCl 0.9 % 800 mL CHEMOTHERAPY     Admin Date Action Dose Route Administered By             04/26/2017 New Bag 800 mg Intravenous Kasey Rowe RN                    sodium chloride (PF) 0.9% PF flush 10 mL     Admin Date Action Dose Route Administered By             04/26/2017 Given 10 mL Intracatheter Kasey Rowe RN                    vinCRIStine (ONCOVIN) 2 mg in NaCl 0.9 % 30 mL CHEMOTHERAPY     Admin Date Action Dose Route Administered By             04/26/2017 New Bag 2 mg Intravenous Kasey Rowe RN

## 2017-04-26 NOTE — MR AVS SNAPSHOT
After Visit Summary   4/26/2017    Cricket Chen    MRN: [id number]           Patient Information     Date Of Birth          1980        Visit Information        Provider Department      4/26/2017 11:30 AM  13 ATC;  ONCOLOGY INFUSION [facility]        Today's Diagnoses     Post-transplant lymphoproliferative disorder (H)    -  1    Encounter for other specified aftercare        Liver replaced by transplant (H)          Care Instructions    BONNIE Alberto message to scheduling:  Krystina/labs/R-CEOP in 3 and 6 weeks   Oracio/labs 7/6   PET/CT 7/5    Contact Numbers  [facility]: [phone]    After Hours:  [phone]  Triage: [phone]    Please call the [facility] Triage line if you experience a temperature greater than or equal to 100.5, shaking chills, have uncontrolled nausea, vomiting and/or diarrhea, dizziness, shortness of breath, chest pain, bleeding, unexplained bruising, or if you have any other new/concerning symptoms, questions or concerns.     If it is after hours, weekends, or holidays, please call the main hospital  at  [phone] and ask to speak to the Oncology doctor on call.     If you are having any concerning symptoms or wish to speak to a provider before your next infusion visit, please call your care coordinator or triage to notify them so we can adequately serve you.     If you need a refill on a narcotic prescription or other medication, please call triage before your infusion appointment.         April 2017 Sunday Monday Tuesday Wednesday Thursday Friday Saturday                                 1       2     3     Outpatient Visit   11:46 AM   [facility]     IR CHEST PORT PLACEMENT >5 YRS   12:00 PM   (60 min.)   [id number]   [facility]     INSERT PORT VASCULAR ACCESS    1:30 PM   Rajendra Jacques PA-C    OR 4     5     [facility] LAB DRAW    6:45 AM   (15 min.)   [facility]   [facility] Draw     UMP ONC INFUSION 360    7:00 AM   (360 min.)    ONCOLOGY INFUSION   [facility] 6     7     8     [facility] ONC INFUSION 30   11:00 AM   (30 min.)    ONCOLOGY INFUSION   [facility]   9     10     11     12     [facility] DRAW   11:15 AM   (15 min.)    M[facility] LAB DRAW   [facility] Draw 13     14     15       16     17     18     [facility] DRAW    9:45 AM   (15 min.)    M[facility] LAB DRAW   [facility] Draw     UMP RETURN KIDNEY TRANSPLANT   10:00 AM   (25 min.)   Jake Sidhu MD   [facility] 19     20     21     22       23     24     25     26     [facility] LAB DRAW    9:45 AM   (15 min.)    [facility] LAB DRAW   [facility] Lab Draw     UMP RETURN   10:05 AM   (50 min.)   Krystina Durham PA-C   [facility]     UMP ONC INFUSION 360   11:30 AM   (360 min.)    ONCOLOGY INFUSION   [facility] 27     28     29     UMP INJECTION   12:45 PM   (15 min.)   Nurse,  Oncology Injection   [facility]   30                                              May 2017   Andre Monday Tuesday Wednesday Thursday Friday Saturday        1     2     3     4     5     6       7     8     9     10     11     12     13       14     15     16     17     [facility] DRAW    8:00 AM   (15 min.)    [facility]     [facility] RETURN    8:25 AM   (50 min.)   Krystina Durham PA-C   [facility]     UMP ONC INFUSION 360    9:30 AM   (360 min.)    ONCOLOGY INFUSION   [facility] 18     19     20       21     22     23     24     25     26     27       28     29     30     31                                Recent Results (from the past 24 hour(s))   CBC with platelets differential    Collection Time: 04/26/17 10:10 AM   Result Value Ref Range    WBC 5.9 4.0 - 11.0 10e9/L    RBC Count  4.27 (L) 4.4 - 5.9 10e12/L    Hemoglobin 13.2 (L) 13.3 - 17.7 g/dL    Hematocrit 39.5 (L) 40.0 - 53.0 %    MCV 93 78 - 100 fl    MCH 30.9 26.5 - 33.0 pg    MCHC 33.4 31.5 - 36.5 g/dL    RDW 14.0 10.0 - 15.0 %    Platelet Count 123 (L) 150 - 450 10e9/L    Diff Method Automated Method     % Neutrophils 82.3 %    % Lymphocytes 6.5 %    % Monocytes 9.5 %    % Eosinophils 0.5 %    % Basophils 0.5 %    % Immature Granulocytes 0.7 %    Nucleated RBCs 0 0 /100    Absolute Neutrophil 4.8 1.6 - 8.3 10e9/L    Absolute Lymphocytes 0.4 (L) 0.8 - 5.3 10e9/L    Absolute Monocytes 0.6 0.0 - 1.3 10e9/L    Absolute Eosinophils 0.0 0.0 - 0.7 10e9/L    Absolute Basophils 0.0 0.0 - 0.2 10e9/L    Abs Immature Granulocytes 0.0 0 - 0.4 10e9/L    Absolute Nucleated RBC 0.0    Comprehensive metabolic panel    Collection Time: 04/26/17 10:10 AM   Result Value Ref Range    Sodium 139 133 - 144 mmol/L    Potassium 3.8 3.4 - 5.3 mmol/L    Chloride 107 94 - 109 mmol/L    Carbon Dioxide 25 20 - 32 mmol/L    Anion Gap 7 3 - 14 mmol/L    Glucose 89 70 - 99 mg/dL    Urea Nitrogen 15 7 - 30 mg/dL    Creatinine 1.12 0.66 - 1.25 mg/dL    GFR Estimate 74 >60 mL/min/1.7m2    GFR Estimate If Black 89 >60 mL/min/1.7m2    Calcium 8.8 8.5 - 10.1 mg/dL    Bilirubin Total 0.8 0.2 - 1.3 mg/dL    Albumin 3.7 3.4 - 5.0 g/dL    Protein Total 6.8 6.8 - 8.8 g/dL    Alkaline Phosphatase 83 40 - 150 U/L    ALT 24 0 - 70 U/L    AST 13 0 - 45 U/L   Magnesium    Collection Time: 04/26/17 10:10 AM   Result Value Ref Range    Magnesium 2.4 (H) 1.6 - 2.3 mg/dL   Phosphorus    Collection Time: 04/26/17 10:10 AM   Result Value Ref Range    Phosphorus 3.0 2.5 - 4.5 mg/dL   Bilirubin direct    Collection Time: 04/26/17 10:10 AM   Result Value Ref Range    Bilirubin Direct 0.1 0.0 - 0.2 mg/dL   Hepatic panel    Collection Time: 04/26/17 10:10 AM   Result Value Ref Range    Bilirubin Direct 0.1 0.0 - 0.2 mg/dL    Bilirubin Total 0.8 0.2 - 1.3 mg/dL    Albumin 3.8 3.4 - 5.0 g/dL     Protein Total 6.7 (L) 6.8 - 8.8 g/dL    Alkaline Phosphatase 85 40 - 150 U/L    ALT 23 0 - 70 U/L    AST 14 0 - 45 U/L               Follow-ups after your visit        Your next 10 appointments already scheduled     Apr 29, 2017  1:00 PM CDT   (Arrive by 12:45 PM)   INJECTION with Uc Oncology Injection Nurse   [facility] ([facility])    [address] [place] [address]  2nd [facility] [address]   [phone]            May 17, 2017  8:00 AM CDT   [facility] Lab Draw with UC [facility] LAB DRAW   [facility] Lab Draw ([facility])    [address]  2nd [facility] [address]   [phone]            May 17, 2017  8:40 AM CDT   (Arrive by 8:25 AM)   Return Visit with Krystina Durham PA-C   [facility] ([facility])    [address] [place] [address]   [phone]            May 17, 2017  9:30 AM CDT   Infusion 360 with UC ONCOLOGY INFUSION, UC 22 ATC   [facility] ([facility])    [address] [address]   [phone]            Jun 07, 2017 10:30 AM CDT   Masonic Lab Draw with UC MASONIC LAB DRAW   [facility] Lab Draw ([facility])    [address]  2nd [facility] [address]   [phone]            Jun 07, 2017 11:10 AM CDT   (Arrive by 10:55 AM)   Return Visit with Krystina Durham PA-C   [facility] ([facility])    [address]  2nd [facility] [address]   [phone]            Jun 07, 2017 12:00 PM CDT   Infusion 360 with UC ONCOLOGY INFUSION, UC 15 ATC   [facility] ([facility])    [address] [place] [address]   [phone]            Jun 26, 2017 11:00 AM CDT   Lab with UC LAB   [facility] ([facility])    [address]  1st Floor  [facility] [address] [phone]              Future tests that were ordered for you today     Open Future Orders        Priority Expected Expires Ordered    NPET Oncology (Eyes to Thighs) Routine 7/5/2017 7/25/2017 4/26/2017            Who to contact     If you have questions or need follow up information about today's clinic visit or your schedule please contact [facility] directly at [phone].  Normal or non-critical lab and imaging results will be communicated to you by Leapsethart, letter or phone within 4 business days after the clinic has received the results. If you do not hear from us within 7 days, please contact the clinic through Zakadat or phone. If you have a critical or abnormal lab result, we will notify you by phone as soon as possible.  Submit refill requests through FlexGen or call your pharmacy and they will forward the refill request to us. Please allow 3 business days for your refill to be completed.          Additional Information About Your Visit        FlexGen Information     FlexGen gives you secure access to your electronic health record. If you see a primary care provider, you can also send messages to your care team and make appointments. If you have questions, please call your primary care clinic.  If you do not have a primary care provider, please call [phone] and they will assist you.        Care EveryWhere ID     This is your Care EveryWhere ID. This could be used by other organizations to access your [place] medical records  [id number]         Blood Pressure from Last 3 Encounters:   04/26/17 128/77   04/26/17 128/77   04/18/17 128/81    Weight from Last 3 Encounters:   04/26/17 94 kg (207 lb 4.8 oz)   04/26/17 94 kg (207 lb 4.8 oz)   04/18/17 93.3 kg (205 lb 11.2 oz)              We Performed the Following     Bilirubin direct     CBC with platelets differential      Comprehensive metabolic panel     Magnesium     Nursing Communication 1     Phosphorus     Treatment Conditions          Today's Medication Changes          These changes are accurate as of: 4/26/17 12:53 PM.  If you have any questions, ask your nurse or doctor.               Start taking these medicines.        Dose/Directions    cyclobenzaprine 5 MG tablet   Commonly known as:  FLEXERIL   Used for:  Back muscle spasm   Started by:  Krystina Durham PA-C        Dose:  5-10 mg   Take 1-2 tablets (5-10 mg) by mouth 3 times daily as needed for muscle spasms   Quantity:  30 tablet   Refills:  3       traZODone 50 MG tablet   Commonly known as:  DESYREL   Used for:  Other insomnia   Started by:  Krystina Durham PA-C        Dose:   mg   Take 0.5-2 tablets ( mg) by mouth At Bedtime   Quantity:  30 tablet   Refills:  1         These medicines have changed or have updated prescriptions.        Dose/Directions    * predniSONE 10 MG tablet   Commonly known as:  DELTASONE   This may have changed:  Another medication with the same name was added. Make sure you understand how and when to take each.   Used for:  Liver replaced by transplant (H), S/P kidney transplant   Changed by:  Pam Egan RN        Dose:  10 mg   Take 1 tablet (10 mg) by mouth daily   Quantity:  21 tablet   Refills:  3       * predniSONE 50 MG tablet   Commonly known as:  DELTASONE   This may have changed:  You were already taking a medication with the same name, and this prescription was added. Make sure you understand how and when to take each.   Used for:  Post-transplant lymphoproliferative disorder (H), Encounter for other specified aftercare        Dose:  50 mg   Take 1 tablet (50 mg) by mouth 2 times daily for 5 days Days 1 through 5.   Quantity:  10 tablet   Refills:  0       * Notice:  This list has 2 medication(s) that are the same as other medications prescribed for you. Read the directions carefully, and ask your  doctor or other care provider to review them with you.      Stop taking these medicines if you haven't already. Please contact your care team if you have questions.     tacrolimus capsule   Stopped by:  Krystina Durham PA-C                Where to get your medicines      These medications were sent to [place], [place] - [address]  [address], [facility] [address]    Hours:  TRANSPLANT PHONE NUMBER [phone] Phone:  [phone]     cyclobenzaprine 5 MG tablet    etoposide 50 MG capsule CHEMO    predniSONE 50 MG tablet    traZODone 50 MG tablet                Primary Care Provider Office Phone # Fax #    Eugenia Perez [phone]       [facility] [address] [facility] [address]  [facility] [address]        Thank you!     Thank you for choosing [facility]  for your care. Our goal is always to provide you with excellent care. Hearing back from our patients is one way we can continue to improve our services. Please take a few minutes to complete the written survey that you may receive in the mail after your visit with us. Thank you!             Your Updated Medication List - Protect others around you: Learn how to safely use, store and throw away your medicines at www.disposemymeds.org.          This list is accurate as of: 4/26/17 12:53 PM.  Always use your most recent med list.                   Brand Name Dispense Instructions for use    amLODIPine 10 MG tablet    NORVASC    90 tablet    Take 1 tablet (10 mg) by mouth daily       cyclobenzaprine 5 MG tablet    FLEXERIL    30 tablet    Take 1-2 tablets (5-10 mg) by mouth 3 times daily as needed for muscle spasms       etoposide 50 MG capsule CHEMO   Start taking on:  4/27/2017     8 capsule    Take 4 capsules (200 mg) by mouth daily for 2 days Day 2 and 3.       lisinopril 5 MG tablet    PRINIVIL/ZESTRIL    90 tablet    Take 1 tablet (5 mg) by mouth  daily       LORazepam 0.5 MG tablet    ATIVAN    30 tablet    Take 1 tablet (0.5 mg) by mouth every 4 hours as needed (Anxiety, Nausea/Vomiting or Sleep)       magnesium oxide 400 (241.3 MG) MG tablet    MAG-OX    120 tablet    Take 2 tablets (800 mg) by mouth 2 times daily       * predniSONE 10 MG tablet    DELTASONE    21 tablet    Take 1 tablet (10 mg) by mouth daily       * predniSONE 50 MG tablet    DELTASONE    10 tablet    Take 1 tablet (50 mg) by mouth 2 times daily for 5 days Days 1 through 5.       prochlorperazine 10 MG tablet    COMPAZINE    30 tablet    Take 1 tablet (10 mg) by mouth every 6 hours as needed (Nausea/Vomiting)       sulfamethoxazole-trimethoprim 400-80 MG per tablet    BACTRIM/SEPTRA    30 tablet    Take 1 tablet by mouth daily       traMADol 50 MG tablet    ULTRAM    45 tablet    Take 1 tablet (50 mg) by mouth every 8 hours as needed for moderate pain       traZODone 50 MG tablet    DESYREL    30 tablet    Take 0.5-2 tablets ( mg) by mouth At Bedtime       vitamin D 2000 UNITS tablet     100 tablet    Take 2,000 Units by mouth daily       * Notice:  This list has 2 medication(s) that are the same as other medications prescribed for you. Read the directions carefully, and ask your doctor or other care provider to review them with you.

## 2017-04-26 NOTE — LETTER
4/26/2017      RE: Cricket Chen  [address]  [place] [address]       Oncology/Hematology Visit Note  Apr 26, 2017    Reason for Visit: follow up of stage III PTLD EBV-positive monomorphic DLBCL    History of Present Illness: Cricket Chen is a 36 year old male with stage III PTLD EBV-positive monomorphic DLBCL. He has a prior history of end-stage liver disease and kidney disease, status post combined liver/kidney transplant back in 05/2016. He was maintained on immunosuppression with tacrolimus and CellCept, and was feeling well up until 12/2016 when he started to experience excessive fatigue and had noticed the emergence of a lump in his right neck. He was subsequently evaluated in January 2017 for persistent right neck lymphadenopathy and underwent a fine needle aspiration, which was largely nondiagnostic. He subsequently followed with excisional lymph node biopsy on 02/01/17, which was diagnostic for monomorphic PTLD/EBV-positive/diffuse large B-cell lymphoma of activated B-cell type, by Tejinder criteria (negative for CD10 and positive for MUM-1). The patient reported persistent fatigue, but no recent weight loss, fevers or drenching night sweats. He had noticed that the lump in the neck subsided after an excisional lymph node biopsy, but still persisted to some extent.       The patient also underwent reduction of immunosuppression with decrease in the dose of his tacrolimus from 4 mg in the morning and 3 mg in the evening to 3 mg b.i.d. His CellCept was also decreased from 750 mg twice a day to 250 mg twice a day.       He underwent a diagnostic PET/CT scan on 2/4/17 and bone marrow biopsy. His PET scan overall demonstrated relatively low burden of disease with no PET-active area in the neck, except from the thyroid gland. The patient was also found to have PET-avid right axillary lymphadenopathy measuring 1.3 cm.       There was no evidence of FDG-avid areas throughout the rest of his body. He  also underwent a diagnostic bone marrow biopsy, which was reported with no morphologic or immunophenotypic evidence of PTLD. FNA of the thyroid lesion on 3/16/17 showed papillary thyroid carcinoma. He started with 4 weeks doses of Rituxan on 2/10/17-3/3/17. CT neck/CAP on 3/28/17 showed a partial response. Therefore, he started on treatment with R-CEOP on 4/5/17. He was not given R-CHOP due to low ejection fraction of 30-35%. He had a port placed on 4/3/17. Please see previous notes for further details on the patient's history. He comes in today for routine follow up prior to cycle 2 R-CEOP.    Interval History:  He reports that he generally tolerated the chemotherapy well. He did have one day of pain and muscles spasm in his low back and hips, likely due to Neulasta. He did not get relief from tramadol. He took 2 Ativan and was able to sleep. He has had occasional headaches that resolve on their own without medication. His energy is fair. He is able to do things around the house and run errands. He naps about once/week for about an hour. He chronically sleeps poorly with difficulty falling asleep. Generally, he does not find a lot of relief from Ativan. He previously tried Ambien without relief. He denies any bleeding issues. His hair has started to fall out, so he shaved his head and face. His 17-year-old nephew also shaved his head. He denies other concerns.     Review of Systems:  Patient denies any of the following except if noted above: fevers, chills, vision or hearing changes, chest pain, dyspnea, abdominal pain, nausea, vomiting, diarrhea, constipation, urinary concerns, current headaches, numbness, tingling, or issues with mood.    Current Outpatient Prescriptions   Medication Sig Dispense Refill     traZODone (DESYREL) 50 MG tablet Take 0.5-2 tablets ( mg) by mouth At Bedtime 30 tablet 1     cyclobenzaprine (FLEXERIL) 5 MG tablet Take 1-2 tablets (5-10 mg) by mouth 3 times daily as needed for muscle  "spasms 30 tablet 3     predniSONE (DELTASONE) 10 MG tablet Take 1 tablet (10 mg) by mouth daily 21 tablet 3     LORazepam (ATIVAN) 0.5 MG tablet Take 1 tablet (0.5 mg) by mouth every 4 hours as needed (Anxiety, Nausea/Vomiting or Sleep) 30 tablet 5     prochlorperazine (COMPAZINE) 10 MG tablet Take 1 tablet (10 mg) by mouth every 6 hours as needed (Nausea/Vomiting) 30 tablet 5     traMADol (ULTRAM) 50 MG tablet Take 1 tablet (50 mg) by mouth every 8 hours as needed for moderate pain 45 tablet 0     Cholecalciferol (VITAMIN D) 2000 UNITS tablet Take 2,000 Units by mouth daily 100 tablet 3     magnesium oxide (MAG-OX) 400 (241.3 MG) MG tablet Take 2 tablets (800 mg) by mouth 2 times daily 120 tablet 3     amLODIPine (NORVASC) 10 MG tablet Take 1 tablet (10 mg) by mouth daily 90 tablet 3     lisinopril (PRINIVIL,ZESTRIL) 5 MG tablet Take 1 tablet (5 mg) by mouth daily 90 tablet 3     sulfamethoxazole-trimethoprim (BACTRIM,SEPTRA) 400-80 MG per tablet Take 1 tablet by mouth daily 30 tablet 11     Physical Examination:  General: The patient is a pleasant male in no acute distress.  /77  Pulse 77  Temp 98.3  F (36.8  C) (Oral)  Resp 16  Ht 1.778 m (5' 10\")  Wt 94 kg (207 lb 4.8 oz)  SpO2 98%  BMI 29.74 kg/m2  Wt Readings from Last 10 Encounters:   04/26/17 94 kg (207 lb 4.8 oz)   04/26/17 94 kg (207 lb 4.8 oz)   04/18/17 93.3 kg (205 lb 11.2 oz)   04/05/17 93.6 kg (206 lb 6.4 oz)   04/03/17 90.7 kg (200 lb)   03/30/17 93.6 kg (206 lb 5.6 oz)   03/02/17 91.3 kg (201 lb 3.2 oz)   02/25/17 90.8 kg (200 lb 1.6 oz)   02/24/17 91.1 kg (200 lb 13.4 oz)   02/17/17 89.9 kg (198 lb 1.6 oz)   HEENT: EOMI, PERRL. Sclerae are anicteric. Oral mucosa is pink and moist with no lesions or thrush.   Lymph: Neck is supple with no lymphadenopathy in the cervical or supraclavicular areas.   Heart: Regular rate and rhythm.   Lungs: Clear to auscultation bilaterally.   Abdomen: Bowel sounds present, soft, nontender with no palpable " hepatosplenomegaly or masses.   Extremities: No lower extremity edema noted bilaterally.   Neuro: Cranial nerves II through XII are grossly intact.  Skin: No rashes, petechiae, or bruising noted on exposed skin.    Laboratory Data:   4/26/2017 10:10   Sodium 139   Potassium 3.8   Chloride 107   Carbon Dioxide 25   Urea Nitrogen 15   Creatinine 1.12   GFR Estimate 74   GFR Estimate If Black 89   Calcium 8.8   Anion Gap 7   Magnesium 2.4 (H)   Phosphorus 3.0   Albumin 3.7   Protein Total 6.8   Bilirubin Total 0.8   Alkaline Phosphatase 83   ALT 24   AST 13   Bilirubin Direct 0.1   Glucose 89   WBC 5.9   Hemoglobin 13.2 (L)   Hematocrit 39.5 (L)   Platelet Count 123 (L)   RBC Count 4.27 (L)   MCV 93   MCH 30.9   MCHC 33.4   RDW 14.0   Diff Method Automated Method   % Neutrophils 82.3   % Lymphocytes 6.5   % Monocytes 9.5   % Eosinophils 0.5   % Basophils 0.5   % Immature Granulocytes 0.7   Nucleated RBCs 0   Absolute Neutrophil 4.8   Absolute Lymphocytes 0.4 (L)   Absolute Monocytes 0.6   Absolute Eosinophils 0.0   Absolute Basophils 0.0   Abs Immature Granulocytes 0.0   Absolute Nucleated RBC 0.0     Assessment and Plan:  1. Stage III PTLD EBV-positive monomorphic DLBCL. Patient received 4 weekly doses of Rituxan with a partial response. He then started on R-CEOP with Neulasta support on 4/5/17. Etoposide is given IV on day 1 and oral on days 2 and 3. He tolerated this fairly well with some back and hip pain/spasm associated with Neulasta. He will continue with cycle 2 today. He will follow up with me prior to cycles 3 and 4. We are planning on 4 cycles of R-CEOP. We will repeat a PET/CT about a month after cycle 4.    2. Papillary thyroid carcinoma. Will defer surgical resection until after completion of 4 cycles of R-CEOP.     3. History of kidney and liver transplant. No current concerns for rejection. Remains on prednisone 10 mg daily. He is off Prograf and Cellcept due to chemotherapy.     4. EBV viremia.  Resolved after 4 weekly doses of Rituxan. Last level on 3/8/17 undetectable.     5. Hypertension. BP is under good control. He remains on amlodipine 10 mg qhs and lisinopril 5 mg daily.     6. Back/hip muscle spasm. Will give Flexeril to take if recurs with Neulasta. He will take Claritin again with this round of chemotherapy.    7. Insomnia. Will give a trial with trazodone. Has previously received sleep hygiene handouts.     Krystina Durham PA-C  [facility]  [address] [place], [place] [address] [phone]

## 2017-04-27 PROBLEM — M25.511 RIGHT SHOULDER PAIN: Status: RESOLVED | Noted: 2017-02-17 | Resolved: 2017-04-27

## 2017-04-27 PROBLEM — M54.2 CERVICALGIA: Status: RESOLVED | Noted: 2017-02-17 | Resolved: 2017-04-27

## 2017-04-27 NOTE — PROGRESS NOTES
DISCHARGE REPORT    Patient failed to follow-up with therapy as planned. Current status unknown. Over the course of their care, they demonstrated excellent improvement. Please see last SOAP note on 3.1 for discharge status and final objective findings. Episode to be closed at this time and patient formally discharged from therapy.    Lanie Fernández, PT, DPT

## 2017-04-29 ENCOUNTER — ALLIED HEALTH/NURSE VISIT (OUTPATIENT)
Dept: ONCOLOGY | Facility: CLINIC | Age: 37
End: 2017-04-29
Attending: INTERNAL MEDICINE
Payer: COMMERCIAL

## 2017-04-29 VITALS
SYSTOLIC BLOOD PRESSURE: 138 MMHG | TEMPERATURE: 96.8 F | RESPIRATION RATE: 16 BRPM | DIASTOLIC BLOOD PRESSURE: 73 MMHG | OXYGEN SATURATION: 100 % | HEART RATE: 68 BPM

## 2017-04-29 DIAGNOSIS — D47.Z1 POST-TRANSPLANT LYMPHOPROLIFERATIVE DISORDER (H): Primary | ICD-10-CM

## 2017-04-29 DIAGNOSIS — Z51.89 ENCOUNTER FOR OTHER SPECIFIED AFTERCARE: ICD-10-CM

## 2017-04-29 PROCEDURE — 25000128 H RX IP 250 OP 636: Mod: ZF | Performed by: PHYSICIAN ASSISTANT

## 2017-04-29 PROCEDURE — 96372 THER/PROPH/DIAG INJ SC/IM: CPT

## 2017-04-29 RX ADMIN — PEGFILGRASTIM 6 MG: 6 INJECTION SUBCUTANEOUS at 13:05

## 2017-04-29 NOTE — MR AVS SNAPSHOT
After Visit Summary   4/29/2017    Cricket Chen    MRN: [id number]           Patient Information     Date Of Birth          1980        Visit Information        Provider Department      4/29/2017 1:00 PM Nurse,  Oncology Injection [facility]        Today's Diagnoses     Post-transplant lymphoproliferative disorder (H)    -  1    Encounter for other specified aftercare          Care Instructions    Contact Numbers  [facility]: [phone]    After Hours:  [phone]  Triage: [phone]    Please call the [facility] Triage line if you experience a temperature greater than or equal to 100.5, shaking chills, have uncontrolled nausea, vomiting and/or diarrhea, dizziness, shortness of breath, chest pain, bleeding, unexplained bruising, or if you have any other new/concerning symptoms, questions or concerns.     If it is after hours, weekends, or holidays, please call the main hospital  at  [phone] and ask to speak to the Oncology doctor on call.     If you are having any concerning symptoms or wish to speak to a provider before your next infusion visit, please call your care coordinator or triage to notify them so we can adequately serve you.     If you need a refill on a narcotic prescription or other medication, please call triage before your infusion appointment.         April 2017 Sunday Monday Tuesday Wednesday Thursday Friday Saturday                                 1       2     3     Outpatient Visit   11:46 AM   [facility]     IR CHEST PORT PLACEMENT >5 YRS   12:00 PM   (60 min.)   ASCCARM7   [facility] ASC Imaging     INSERT PORT VASCULAR ACCESS    1:30 PM   Rajendra Jacques PA-C    [facility] 4     [facility]45 AM   (15 min.)    [facility]     [facility] ONC INFUSION 360    7:00 AM   (360 min.)    ONCOLOGY INFUSION   [facility] 6     7     8      [facility] ONC INFUSION 30   11:00 AM   (30 min.)   [facility] ON[facility]Y INFUSION   [facility]   9     10     11     12     [facility] LAB DRAW   11:15 AM   (15 min.)    MASONIC LAB DRAW   [facility] Masonic Lab Draw 13     14     15       16     17     18     [facility] LAB DRAW    9:45 AM   (15 min.)   [facility] LAB DRAW   [facility] Masonic Lab Draw     [facility] RETURN KIDNEY TRANSPLANT   10:00 AM   (25 min.)   Jake Sidhu MD   [facility] Nephrology 19     20     21     22       23     24     25     26     [facility] LAB DRAW    9:45 AM   (15 min.)    [facility] LAB DRAW   [facility] Masonic Lab Draw     [facility] RETURN   10:05 AM   (50 min.)   Krystina Durham PA-C   formerly [facility]     [facility] ONC INFUSION 360   11:30 AM   (360 min.)    ONCOLOGY INFUSION   [facility] 27     28     29     [facility] INJECTION   12:45 PM   (15 min.)   Nurse,  Oncology Injection   [facility]   30                                              May 2017   Andre Monday Tuesday Wednesday Thursday Friday Saturday        1     2     3     4     5     6       7     8     9     10     11     12     13       14     15     16     17     [facility] MASON[facility] LAB DRAW    8:00 AM   (15 min.)    M[facility]ONIC LAB DRAW   [facility] Masonic Lab Draw     [facility] RETURN    8:25 AM   (50 min.)   Krystina Durham PA-C   formerly [facility]     [facility] ONC INFUSION 360    9:30 AM   (360 min.)    ONCOLOGY INFUSION   [facility] 18     19     20       21     22     23     24     25     26     27       28     29     30     31                              No results found for this or any previous visit (from the past 24 hour(s)).            Follow-ups after your visit        Your next 10 appointments already scheduled     May 17, 2017  8:00 AM T   M[facility]ic Lab Draw with  MASONIC LAB DRAW   [facility] Masonic Lab Draw ([facility] [place])    [address] [place] [address]  [facility] [address]   [phone]            May 17, 2017  8:40 AM CDT   (Arrive by 8:25 AM)   Return Visit with Krystina Durham PA-C   [facility] ([facility])    [address] [place] [address]  2nd [facility] [address]   [phone]            May 17, 2017  9:30 AM CDT   Infusion 360 with UC ONCOLOGY INFUSION, UC 22 ATC   [facility] ([facility])    [address] [place] [address]  2nd [facility] [address]   [phone]            Jun 07, 2017 10:30 AM CDT   [facility] Lab Draw with  MASONIC LAB DRAW   [facility] Lab Draw ([facility])    [address] [place] [address]  2nd [facility] [address]   [phone]            Jun 07, 2017 11:10 AM CDT   (Arrive by 10:55 AM)   Return Visit with Krystina Durham PA-C   [facility] ([facility])    [address] [place] [address]  2nd [facility] [address]   [phone]            Jun 07, 2017 12:00 PM CDT   Infusion 360 with UC ONCOLOGY INFUSION, UC 15 ATC   [facility] ([facility])    [address] [place] [address]  2nd [facility] [address]   [phone]            Jun 26, 2017 11:00 AM CDT   Lab with  LAB   [facility] ([facility])    [address] [place] [address]  1st [facility] [address]   [phone]            Jun 26, 2017 11:50 AM CDT   (Arrive by 11:35 AM)   Return Liver Transplant with Laureen Galvan MD   [facility] Hepatology ([facility])    [address] [place] [address]  3rd [facility] [address] [phone]              Who to contact     If you have questions or need follow up information about today's clinic visit or your schedule please contact [facility] directly at [phone].  Normal or non-critical lab and imaging results will  be communicated to you by MyChart, letter or phone within 4 business days after the clinic has received the results. If you do not hear from us within 7 days, please contact the clinic through [employer] or phone. If you have a critical or abnormal lab result, we will notify you by phone as soon as possible.  Submit refill requests through [employer] or call your pharmacy and they will forward the refill request to us. Please allow 3 business days for your refill to be completed.          Additional Information About Your Visit        [employer] Information     [employer] gives you secure access to your electronic health record. If you see a primary care provider, you can also send messages to your care team and make appointments. If you have questions, please call your primary care clinic.  If you do not have a primary care provider, please call [phone] and they will assist you.        Care EveryWhere ID     This is your Care EveryWhere ID. This could be used by other organizations to access your [place] medical records  [id number]        Your Vitals Were     Pulse Temperature Respirations Pulse Oximetry          68 96.8  F (36  C) (Tympanic) 16 100%         Blood Pressure from Last 3 Encounters:   04/29/17 138/73   04/26/17 128/77   04/26/17 128/77    Weight from Last 3 Encounters:   04/26/17 94 kg (207 lb 4.8 oz)   04/26/17 94 kg (207 lb 4.8 oz)   04/18/17 93.3 kg (205 lb 11.2 oz)              Today, you had the following     No orders found for display       Primary Care Provider Office Phone # Fax #    [place] Katerina Perez [phone]       [address] [address]        Thank you!     Thank you for choosing [facility] CANCER [facility]  for your care. Our goal is always to provide you with excellent care. Hearing back from our patients is one way we can continue to improve our services. Please take a few minutes to complete the written survey that you may  receive in the mail after your visit with us. Thank you!             Your Updated Medication List - Protect others around you: Learn how to safely use, store and throw away your medicines at www.disposemymeds.org.          This list is accurate as of: 4/29/17  1:16 PM.  Always use your most recent med list.                   Brand Name Dispense Instructions for use    amLODIPine 10 MG tablet    NORVASC    90 tablet    Take 1 tablet (10 mg) by mouth daily       cyclobenzaprine 5 MG tablet    FLEXERIL    30 tablet    Take 1-2 tablets (5-10 mg) by mouth 3 times daily as needed for muscle spasms       etoposide 50 MG capsule CHEMO     8 capsule    Take 4 capsules (200 mg) by mouth daily for 2 days Day 2 and 3.       lisinopril 5 MG tablet    PRINIVIL/ZESTRIL    90 tablet    Take 1 tablet (5 mg) by mouth daily       LORazepam 0.5 MG tablet    ATIVAN    30 tablet    Take 1 tablet (0.5 mg) by mouth every 4 hours as needed (Anxiety, Nausea/Vomiting or Sleep)       magnesium oxide 400 (241.3 MG) MG tablet    MAG-OX    120 tablet    Take 2 tablets (800 mg) by mouth 2 times daily       * predniSONE 10 MG tablet    DELTASONE    21 tablet    Take 1 tablet (10 mg) by mouth daily       * predniSONE 50 MG tablet    DELTASONE    10 tablet    Take 1 tablet (50 mg) by mouth 2 times daily for 5 days Days 1 through 5.       prochlorperazine 10 MG tablet    COMPAZINE    30 tablet    Take 1 tablet (10 mg) by mouth every 6 hours as needed (Nausea/Vomiting)       sulfamethoxazole-trimethoprim 400-80 MG per tablet    BACTRIM/SEPTRA    30 tablet    Take 1 tablet by mouth daily       traMADol 50 MG tablet    ULTRAM    45 tablet    Take 1 tablet (50 mg) by mouth every 8 hours as needed for moderate pain       traZODone 50 MG tablet    DESYREL    30 tablet    Take 0.5-2 tablets ( mg) by mouth At Bedtime       vitamin D 2000 UNITS tablet     100 tablet    Take 2,000 Units by mouth daily       * Notice:  This list has 2 medication(s) that  are the same as other medications prescribed for you. Read the directions carefully, and ask your doctor or other care provider to review them with you.

## 2017-04-29 NOTE — PATIENT INSTRUCTIONS
Contact Numbers  [facility]: [phone]    After Hours:  [phone]  Triage: [phone]    Please call the [facility] Triage line if you experience a temperature greater than or equal to 100.5, shaking chills, have uncontrolled nausea, vomiting and/or diarrhea, dizziness, shortness of breath, chest pain, bleeding, unexplained bruising, or if you have any other new/concerning symptoms, questions or concerns.     If it is after hours, weekends, or holidays, please call the main hospital  at  [phone] and ask to speak to the Oncology doctor on call.     If you are having any concerning symptoms or wish to speak to a provider before your next infusion visit, please call your care coordinator or triage to notify them so we can adequately serve you.     If you need a refill on a narcotic prescription or other medication, please call triage before your infusion appointment.         April 2017 Sunday Monday Tuesday Wednesday Thursday Friday Saturday                                 1       2     3     Outpatient Visit   11:46 AM   [facility]     IR CHEST PORT PLACEMENT >5 YRS   12:00 PM   (60 min.)   ASCCARM7   [facility] ASC Imaging     INSERT PORT VASCULAR ACCESS    1:30 PM   Rajendra Jacques PA-C    OR 4     5     [facility] LAB DRAW    6:45 AM   (15 min.)   [facility]ON[facility] LAB DRAW   [facility] Lab Draw     [facility] ONC INFUSION 360    7:00 AM   (360 min.)    ONCOLOGY INFUSION   [facility] 6     7     8     [facility] ONC INFUSION 30   11:00 AM   (30 min.)    ONCOLOGY INFUSION   [facility]   9     10     11     12     P MASONIC LAB DRAW   11:15 AM   (15 min.)    [facility]   [facility] 13     14     15       16     17     18     P MASONIC LAB DRAW    9:45 AM   (15 min.)    [facility] LAB DRAW   [facility]     [facility] RETURN KIDNEY TRANSPLANT   10:00 AM   (25 min.)   Jake Sidhu,  MD [facility] Nephrology 19     20     21     22       23     24     25     26     [facility] MASONIC LAB DRAW    9:45 AM   (15 min.)    MASONIC LAB DRAW   [facility] Masonic Lab Draw     UMP RETURN   10:05 AM   (50 min.)   Krystina Durham PA-C   [facility]     UMP ONC INFUSION 360   11:30 AM   (360 min.)    ONCOLOGY INFUSION   [facility] 27     28     29     UMP INJECTION   12:45 PM   (15 min.)   Nurse,  Oncology Injection   [facility]   30                                              May 2017   Andre Monday Tuesday Wednesday Thursday Friday Saturday        1     2     3     4     5     6       7     8     9     10     11     12     13       14     15     16     17     [facility] MASONIC LAB DRAW    8:00 AM   (15 min.)    MASONIC LAB DRAW   [facility] Masonic Lab Draw     UMP RETURN    8:25 AM   (50 min.)   Krystina Durham [facility]-C   [facility]    9:30 AM   (360 min.)    ONCOLOGY INFUSION   [facility] 18     19     20       21     22     23     24     25     26     27       28     29     30     31                              No results found for this or any previous visit (from the past 24 hour(s)).

## 2017-04-29 NOTE — PROGRESS NOTES
Infusion Nursing Note:  Cricket Chen presents today for Neulasta.    Patient seen by provider today: No    Treatment Conditions:  Not Applicable.    Intravenous Access:  N/A.      Note:   Proceed with treatment.    Copy of AVS given to patient via Mychart.  Tolerated injection into L arm without incident. No Prescriptions filled today.   D/C in care of self.  Pt will return 5/17 for provider/infusion.   IB sent to JOSE Hernandez to follow up/clarification with pt regarding lab appts between cycles.      Kasey Rowe RN    Administrations This Visit     pegfilgrastim (NEULASTA) injection 6 mg     Admin Date Action Dose Route Administered By             04/29/2017 Given 6 mg Subcutaneous Kasey Rowe RN

## 2017-05-02 DIAGNOSIS — Z94.0 S/P KIDNEY TRANSPLANT: ICD-10-CM

## 2017-05-02 DIAGNOSIS — Z94.4 LIVER REPLACED BY TRANSPLANT (H): ICD-10-CM

## 2017-05-02 RX ORDER — SULFAMETHOXAZOLE AND TRIMETHOPRIM 400; 80 MG/1; MG/1
1 TABLET ORAL DAILY
Qty: 30 TABLET | Refills: 11 | Status: SHIPPED | OUTPATIENT
Start: 2017-05-02 | End: 2018-03-01

## 2017-05-04 PROBLEM — Z51.89 ENCOUNTER FOR OTHER SPECIFIED AFTERCARE: Status: RESOLVED | Noted: 2017-04-04 | Resolved: 2017-05-04

## 2017-05-05 ENCOUNTER — CARE COORDINATION (OUTPATIENT)
Dept: ONCOLOGY | Facility: CLINIC | Age: 37
End: 2017-05-05

## 2017-05-05 NOTE — PROGRESS NOTES
Writer called Cricket to inform him know he did not need labs between cycles. Last cycle his counts did not drop anywhere near needing transfusion or neutropenia. Cricket states he feels good. Last night he experienced some muscles spasms in his lower back to upper legs. Lasted about 2 hours. Fell asleep and when he awoke this morning they were gone, He attributes them to the Neulasta. He took Claritin the day of and 3 days after his Neulasta which helped greatly. Last time the muscle spasms were much worse. Instructed Cricket that if he had problems between cycles, fevers, signs of bleeding/infection or SOB he is to call us. Cricket expressed good understanding of the above. To return to clinic on 5/17 for labs, provider visit and next cycle of chemotherapy.

## 2017-05-11 ENCOUNTER — TELEPHONE (OUTPATIENT)
Dept: PHARMACY | Facility: CLINIC | Age: 37
End: 2017-05-11

## 2017-05-17 ENCOUNTER — ONCOLOGY VISIT (OUTPATIENT)
Dept: ONCOLOGY | Facility: CLINIC | Age: 37
End: 2017-05-17
Attending: INTERNAL MEDICINE
Payer: COMMERCIAL

## 2017-05-17 VITALS
SYSTOLIC BLOOD PRESSURE: 136 MMHG | BODY MASS INDEX: 29.89 KG/M2 | HEIGHT: 70 IN | OXYGEN SATURATION: 96 % | RESPIRATION RATE: 18 BRPM | DIASTOLIC BLOOD PRESSURE: 74 MMHG | HEART RATE: 74 BPM | WEIGHT: 208.8 LBS | TEMPERATURE: 97.4 F

## 2017-05-17 VITALS
HEART RATE: 74 BPM | SYSTOLIC BLOOD PRESSURE: 136 MMHG | DIASTOLIC BLOOD PRESSURE: 74 MMHG | WEIGHT: 208 LBS | OXYGEN SATURATION: 96 % | TEMPERATURE: 97.4 F | BODY MASS INDEX: 29.84 KG/M2

## 2017-05-17 DIAGNOSIS — D47.Z1 POST-TRANSPLANT LYMPHOPROLIFERATIVE DISORDER (H): Primary | ICD-10-CM

## 2017-05-17 DIAGNOSIS — Z94.4 LIVER REPLACED BY TRANSPLANT (H): ICD-10-CM

## 2017-05-17 LAB
ALBUMIN SERPL-MCNC: 3.6 G/DL (ref 3.4–5)
ALBUMIN SERPL-MCNC: 3.7 G/DL (ref 3.4–5)
ALP SERPL-CCNC: 70 U/L (ref 40–150)
ALP SERPL-CCNC: 72 U/L (ref 40–150)
ALT SERPL W P-5'-P-CCNC: 20 U/L (ref 0–70)
ALT SERPL W P-5'-P-CCNC: 21 U/L (ref 0–70)
ANION GAP SERPL CALCULATED.3IONS-SCNC: 10 MMOL/L (ref 3–14)
ANION GAP SERPL CALCULATED.3IONS-SCNC: 11 MMOL/L (ref 3–14)
AST SERPL W P-5'-P-CCNC: 10 U/L (ref 0–45)
AST SERPL W P-5'-P-CCNC: 11 U/L (ref 0–45)
BASOPHILS # BLD AUTO: 0 10E9/L (ref 0–0.2)
BASOPHILS NFR BLD AUTO: 0.6 %
BILIRUB DIRECT SERPL-MCNC: <0.1 MG/DL (ref 0–0.2)
BILIRUB SERPL-MCNC: 0.3 MG/DL (ref 0.2–1.3)
BILIRUB SERPL-MCNC: 0.3 MG/DL (ref 0.2–1.3)
BUN SERPL-MCNC: 17 MG/DL (ref 7–30)
BUN SERPL-MCNC: 18 MG/DL (ref 7–30)
CALCIUM SERPL-MCNC: 8.3 MG/DL (ref 8.5–10.1)
CALCIUM SERPL-MCNC: 8.8 MG/DL (ref 8.5–10.1)
CHLORIDE SERPL-SCNC: 110 MMOL/L (ref 94–109)
CHLORIDE SERPL-SCNC: 110 MMOL/L (ref 94–109)
CO2 SERPL-SCNC: 23 MMOL/L (ref 20–32)
CO2 SERPL-SCNC: 24 MMOL/L (ref 20–32)
CREAT SERPL-MCNC: 1.12 MG/DL (ref 0.66–1.25)
CREAT SERPL-MCNC: 1.15 MG/DL (ref 0.66–1.25)
DIFFERENTIAL METHOD BLD: ABNORMAL
EOSINOPHIL # BLD AUTO: 0 10E9/L (ref 0–0.7)
EOSINOPHIL NFR BLD AUTO: 0.8 %
ERYTHROCYTE [DISTWIDTH] IN BLOOD BY AUTOMATED COUNT: 14.2 % (ref 10–15)
ERYTHROCYTE [DISTWIDTH] IN BLOOD BY AUTOMATED COUNT: 14.4 % (ref 10–15)
GFR SERPL CREATININE-BSD FRML MDRD: 72 ML/MIN/1.7M2
GFR SERPL CREATININE-BSD FRML MDRD: 74 ML/MIN/1.7M2
GLUCOSE SERPL-MCNC: 124 MG/DL (ref 70–99)
GLUCOSE SERPL-MCNC: 128 MG/DL (ref 70–99)
HCT VFR BLD AUTO: 38 % (ref 40–53)
HCT VFR BLD AUTO: 38.1 % (ref 40–53)
HGB BLD-MCNC: 12.3 G/DL (ref 13.3–17.7)
HGB BLD-MCNC: 12.5 G/DL (ref 13.3–17.7)
IMM GRANULOCYTES # BLD: 0 10E9/L (ref 0–0.4)
IMM GRANULOCYTES NFR BLD: 0.8 %
LYMPHOCYTES # BLD AUTO: 0.4 10E9/L (ref 0.8–5.3)
LYMPHOCYTES NFR BLD AUTO: 8 %
MAGNESIUM SERPL-MCNC: 2.4 MG/DL (ref 1.6–2.3)
MCH RBC QN AUTO: 30.5 PG (ref 26.5–33)
MCH RBC QN AUTO: 31 PG (ref 26.5–33)
MCHC RBC AUTO-ENTMCNC: 32.3 G/DL (ref 31.5–36.5)
MCHC RBC AUTO-ENTMCNC: 32.9 G/DL (ref 31.5–36.5)
MCV RBC AUTO: 94 FL (ref 78–100)
MCV RBC AUTO: 95 FL (ref 78–100)
MONOCYTES # BLD AUTO: 0.4 10E9/L (ref 0–1.3)
MONOCYTES NFR BLD AUTO: 7.6 %
NEUTROPHILS # BLD AUTO: 4.1 10E9/L (ref 1.6–8.3)
NEUTROPHILS NFR BLD AUTO: 82.2 %
NRBC # BLD AUTO: 0 10*3/UL
NRBC BLD AUTO-RTO: 0 /100
PHOSPHATE SERPL-MCNC: 3.1 MG/DL (ref 2.5–4.5)
PLATELET # BLD AUTO: 91 10E9/L (ref 150–450)
PLATELET # BLD AUTO: 93 10E9/L (ref 150–450)
POTASSIUM SERPL-SCNC: 3.4 MMOL/L (ref 3.4–5.3)
POTASSIUM SERPL-SCNC: 3.6 MMOL/L (ref 3.4–5.3)
PROT SERPL-MCNC: 6.2 G/DL (ref 6.8–8.8)
PROT SERPL-MCNC: 6.5 G/DL (ref 6.8–8.8)
RBC # BLD AUTO: 4.03 10E12/L (ref 4.4–5.9)
RBC # BLD AUTO: 4.03 10E12/L (ref 4.4–5.9)
SODIUM SERPL-SCNC: 143 MMOL/L (ref 133–144)
SODIUM SERPL-SCNC: 144 MMOL/L (ref 133–144)
WBC # BLD AUTO: 5 10E9/L (ref 4–11)
WBC # BLD AUTO: 5.1 10E9/L (ref 4–11)

## 2017-05-17 PROCEDURE — 25000128 H RX IP 250 OP 636: Mod: ZF | Performed by: PHYSICIAN ASSISTANT

## 2017-05-17 PROCEDURE — 80076 HEPATIC FUNCTION PANEL: CPT | Performed by: INTERNAL MEDICINE

## 2017-05-17 PROCEDURE — 25000132 ZZH RX MED GY IP 250 OP 250 PS 637: Mod: ZF | Performed by: PHYSICIAN ASSISTANT

## 2017-05-17 PROCEDURE — 85025 COMPLETE CBC W/AUTO DIFF WBC: CPT | Performed by: INTERNAL MEDICINE

## 2017-05-17 PROCEDURE — 80053 COMPREHEN METABOLIC PANEL: CPT | Performed by: INTERNAL MEDICINE

## 2017-05-17 PROCEDURE — 25000125 ZZHC RX 250: Mod: ZF | Performed by: PHYSICIAN ASSISTANT

## 2017-05-17 PROCEDURE — 99214 OFFICE O/P EST MOD 30 MIN: CPT | Mod: ZP | Performed by: PHYSICIAN ASSISTANT

## 2017-05-17 PROCEDURE — 85027 COMPLETE CBC AUTOMATED: CPT | Performed by: INTERNAL MEDICINE

## 2017-05-17 PROCEDURE — 96413 CHEMO IV INFUSION 1 HR: CPT

## 2017-05-17 PROCEDURE — 99212 OFFICE O/P EST SF 10 MIN: CPT | Mod: ZF

## 2017-05-17 PROCEDURE — 84100 ASSAY OF PHOSPHORUS: CPT | Performed by: INTERNAL MEDICINE

## 2017-05-17 PROCEDURE — 80048 BASIC METABOLIC PNL TOTAL CA: CPT | Performed by: INTERNAL MEDICINE

## 2017-05-17 PROCEDURE — 96415 CHEMO IV INFUSION ADDL HR: CPT

## 2017-05-17 PROCEDURE — 96367 TX/PROPH/DG ADDL SEQ IV INF: CPT

## 2017-05-17 PROCEDURE — 96417 CHEMO IV INFUS EACH ADDL SEQ: CPT

## 2017-05-17 PROCEDURE — 83735 ASSAY OF MAGNESIUM: CPT | Performed by: INTERNAL MEDICINE

## 2017-05-17 PROCEDURE — 96375 TX/PRO/DX INJ NEW DRUG ADDON: CPT

## 2017-05-17 PROCEDURE — 96411 CHEMO IV PUSH ADDL DRUG: CPT

## 2017-05-17 RX ORDER — METHYLPREDNISOLONE SODIUM SUCCINATE 125 MG/2ML
125 INJECTION, POWDER, LYOPHILIZED, FOR SOLUTION INTRAMUSCULAR; INTRAVENOUS
Status: CANCELLED
Start: 2017-05-17

## 2017-05-17 RX ORDER — ACETAMINOPHEN 325 MG/1
650 TABLET ORAL ONCE
Status: CANCELLED
Start: 2017-05-17 | End: 2017-05-17

## 2017-05-17 RX ORDER — DIPHENHYDRAMINE HYDROCHLORIDE 50 MG/ML
50 INJECTION INTRAMUSCULAR; INTRAVENOUS
Status: CANCELLED
Start: 2017-05-17

## 2017-05-17 RX ORDER — HEPARIN SODIUM (PORCINE) LOCK FLUSH IV SOLN 100 UNIT/ML 100 UNIT/ML
5 SOLUTION INTRAVENOUS EVERY 8 HOURS
Status: CANCELLED
Start: 2017-05-17

## 2017-05-17 RX ORDER — ALBUTEROL SULFATE 0.83 MG/ML
2.5 SOLUTION RESPIRATORY (INHALATION)
Status: CANCELLED | OUTPATIENT
Start: 2017-05-17

## 2017-05-17 RX ORDER — HEPARIN SODIUM (PORCINE) LOCK FLUSH IV SOLN 100 UNIT/ML 100 UNIT/ML
5 SOLUTION INTRAVENOUS EVERY 8 HOURS
Status: DISCONTINUED | OUTPATIENT
Start: 2017-05-17 | End: 2017-05-17 | Stop reason: HOSPADM

## 2017-05-17 RX ORDER — MEPERIDINE HYDROCHLORIDE 25 MG/ML
25 INJECTION INTRAMUSCULAR; INTRAVENOUS; SUBCUTANEOUS EVERY 30 MIN PRN
Status: CANCELLED | OUTPATIENT
Start: 2017-05-17

## 2017-05-17 RX ORDER — ETOPOSIDE 50 MG/1
100 CAPSULE ORAL DAILY
Qty: 8 CAPSULE | Refills: 0 | Status: SHIPPED | OUTPATIENT
Start: 2017-05-18 | End: 2017-05-20

## 2017-05-17 RX ORDER — HEPARIN SODIUM (PORCINE) LOCK FLUSH IV SOLN 100 UNIT/ML 100 UNIT/ML
5 SOLUTION INTRAVENOUS DAILY PRN
Status: DISCONTINUED | OUTPATIENT
Start: 2017-05-17 | End: 2017-05-17 | Stop reason: HOSPADM

## 2017-05-17 RX ORDER — SODIUM CHLORIDE 9 MG/ML
1000 INJECTION, SOLUTION INTRAVENOUS CONTINUOUS PRN
Status: CANCELLED
Start: 2017-05-17

## 2017-05-17 RX ORDER — LORAZEPAM 2 MG/ML
0.5 INJECTION INTRAMUSCULAR EVERY 4 HOURS PRN
Status: CANCELLED
Start: 2017-05-17

## 2017-05-17 RX ORDER — EPINEPHRINE 0.3 MG/.3ML
0.3 INJECTION SUBCUTANEOUS EVERY 5 MIN PRN
Status: CANCELLED | OUTPATIENT
Start: 2017-05-17

## 2017-05-17 RX ORDER — ALBUTEROL SULFATE 90 UG/1
1-2 AEROSOL, METERED RESPIRATORY (INHALATION)
Status: CANCELLED
Start: 2017-05-17

## 2017-05-17 RX ORDER — PALONOSETRON 0.05 MG/ML
0.25 INJECTION, SOLUTION INTRAVENOUS ONCE
Status: COMPLETED | OUTPATIENT
Start: 2017-05-17 | End: 2017-05-17

## 2017-05-17 RX ORDER — PREDNISONE 50 MG/1
50 TABLET ORAL 2 TIMES DAILY
Qty: 10 TABLET | Refills: 0 | Status: SHIPPED | OUTPATIENT
Start: 2017-05-17 | End: 2017-05-22

## 2017-05-17 RX ORDER — PALONOSETRON 0.05 MG/ML
0.25 INJECTION, SOLUTION INTRAVENOUS ONCE
Status: CANCELLED
Start: 2017-05-17

## 2017-05-17 RX ORDER — DIPHENHYDRAMINE HCL 25 MG
50 CAPSULE ORAL ONCE
Status: CANCELLED
Start: 2017-05-17 | End: 2017-05-17

## 2017-05-17 RX ORDER — MEPERIDINE HYDROCHLORIDE 25 MG/ML
25 INJECTION INTRAMUSCULAR; INTRAVENOUS; SUBCUTANEOUS
Status: CANCELLED
Start: 2017-05-17

## 2017-05-17 RX ORDER — ACETAMINOPHEN 325 MG/1
650 TABLET ORAL ONCE
Status: COMPLETED | OUTPATIENT
Start: 2017-05-17 | End: 2017-05-17

## 2017-05-17 RX ADMIN — PALONOSETRON HYDROCHLORIDE 0.25 MG: 0.25 INJECTION INTRAVENOUS at 09:43

## 2017-05-17 RX ADMIN — RITUXIMAB 800 MG: 10 INJECTION, SOLUTION INTRAVENOUS at 10:27

## 2017-05-17 RX ADMIN — SODIUM CHLORIDE 250 ML: 9 INJECTION, SOLUTION INTRAVENOUS at 09:30

## 2017-05-17 RX ADMIN — SODIUM CHLORIDE, PRESERVATIVE FREE 5 ML: 5 INJECTION INTRAVENOUS at 08:04

## 2017-05-17 RX ADMIN — DIPHENHYDRAMINE HYDROCHLORIDE 50 MG: 50 INJECTION, SOLUTION INTRAMUSCULAR; INTRAVENOUS at 09:30

## 2017-05-17 RX ADMIN — SODIUM CHLORIDE, PRESERVATIVE FREE 5 ML: 5 INJECTION INTRAVENOUS at 15:15

## 2017-05-17 RX ADMIN — ACETAMINOPHEN 650 MG: 325 TABLET ORAL at 09:33

## 2017-05-17 RX ADMIN — SODIUM CHLORIDE 150 MG: 9 INJECTION, SOLUTION INTRAVENOUS at 09:45

## 2017-05-17 RX ADMIN — VINCRISTINE SULFATE 2 MG: 1 INJECTION, SOLUTION INTRAVENOUS at 13:12

## 2017-05-17 RX ADMIN — ETOPOSIDE 110 MG: 20 INJECTION, SOLUTION, CONCENTRATE INTRAVENOUS at 14:08

## 2017-05-17 RX ADMIN — CYCLOPHOSPHAMIDE 1650 MG: 2 INJECTION, POWDER, FOR SOLUTION INTRAVENOUS; ORAL at 13:19

## 2017-05-17 ASSESSMENT — PAIN SCALES - GENERAL: PAINLEVEL: NO PAIN (0)

## 2017-05-17 NOTE — MR AVS SNAPSHOT
After Visit Summary   5/17/2017    Cricket Chen    MRN: [id number]           Patient Information     Date Of Birth          1980        Visit Information        Provider Department      5/17/2017 8:40 AM Krystina Durham PA-C [facility]        Today's Diagnoses     Post-transplant lymphoproliferative disorder (H)    -  1    Liver replaced by transplant (H)           Follow-ups after your visit        Your next 10 appointments already scheduled     May 17, 2017  9:30 AM CDT   Infusion 360 with UC ONCOLOGY INFUSION, UC 22 ATC   [facility] ([facility])    [address] [place] [address]  2nd [facility] [address]   [phone]            Jun 07, 2017 10:30 AM CDT   [facility]ason[facility] Lab Draw with UC MASONIC LAB DRAW   [facility] Lab Draw ([facility])    [address] [place] [address]  2nd [facility] [address]   [phone]            Jun 07, 2017 11:10 AM CDT   (Arrive by 10:55 AM)   Return Visit with Krystina Durham PA-C   [facility] ([facility])    [address] [place] [address]  2nd [facility] [address]   [phone]            Jun 07, 2017 12:00 PM CDT   Infusion 360 with UC ONCOLOGY INFUSION, UC 15 ATC   [facility] ([facility])    [address] [place] [address]  2nd [facility] [address]   [phone]            Jun 26, 2017 11:00 AM CDT   Lab with  LAB   [facility] Lab ([facility])    [address] [place] [address]  1st [facility] [address]   [phone]            Jun 26, 2017 11:50 AM CDT   (Arrive by 11:35 AM)   Return Liver Transplant with Laureen Galvan MD   [facility] Hepatology ([facility])    [address] [place] [address]  3rd [facility] [address]   [phone]            Jul 05, 2017 12:45 PM CDT   PE NPET ONCOLOGY (EYES TO  THIGHS) with UUPET1   [facility] PET CT ([facility])    [address] [address] [phone]           Tell your doctor:   If there is any chance you may be pregnant or if you are breastfeeding.   If you have problems lying in small spaces (claustrophobia). If you do, your doctor may give you medicine to help you relax. If you have diabetes:   Have your exam early in the morning. Your blood glucose will go up as the day goes by.   Your glucose level must be 180 or less at the start of the exam. Please take any medicines you need to ensure this blood glucose level. 24 hours before your scan: Don t do any heavy exercise. (No jogging, aerobics or other workouts.) Exercise will make your pictures less accurate. 6 hours before your scan:   Stop all food and liquids (except water).   Do not chew gum or suck on mints.   If you need to take medicine with food, you may take it with a few crackers.  Please call your Imaging Department at your exam site with any questions.            Jul 06, 2017  9:00 AM [facility]   [place] Lab Draw with  MASONIC LAB DRAW   [facility] Lab Draw ([facility] [place])    [address] [address] [phone]              Who to contact     If you have questions or need follow up information about today's clinic visit or your schedule please contact [facility] directly at [phone].  Normal or non-critical lab and imaging results will be communicated to you by MyChart, letter or phone within 4 business days after the clinic has received the results. If you do not hear from us within 7 days, please contact the clinic through MyChart or phone. If you have a critical or abnormal lab result, we will notify you by phone as soon as possible.  Submit refill requests through [employer] or call your pharmacy and they will forward the refill request to us.  "Please allow 3 business days for your refill to be completed.          Additional Information About Your Visit        MyChart Information     [email or web address] gives you secure access to your electronic health record. If you see a primary care provider, you can also send messages to your care team and make appointments. If you have questions, please call your primary care clinic.  If you do not have a primary care provider, please call [phone] and they will assist you.        Care EveryWhere ID     This is your Care EveryWhere ID. This could be used by other organizations to access your [place] medical records  [id number]        Your Vitals Were     Pulse Temperature Respirations Height Pulse Oximetry BMI (Body Mass Index)    74 97.4  F (36.3  C) (Oral) 18 1.778 m (5' 10\") 96% 29.96 kg/m2       Blood Pressure from Last 3 Encounters:   05/17/17 136/74   04/29/17 138/73   04/26/17 128/77    Weight from Last 3 Encounters:   05/17/17 94.7 kg (208 lb 12.8 oz)   04/26/17 94 kg (207 lb 4.8 oz)   04/26/17 94 kg (207 lb 4.8 oz)              We Performed the Following     Basic metabolic panel     CBC with platelets     Hepatic panel     Magnesium     Phosphorus        Primary Care Provider Office Phone # Fax #    Eugenia Perez [phone]       [address]  [facility] [address]        Thank you!     Thank you for choosing [facility] CANCER [facility]  for your care. Our goal is always to provide you with excellent care. Hearing back from our patients is one way we can continue to improve our services. Please take a few minutes to complete the written survey that you may receive in the mail after your visit with us. Thank you!             Your Updated Medication List - Protect others around you: Learn how to safely use, store and throw away your medicines at www.disposemymeds.org.          This list is accurate as of: 5/17/17  9:02 AM.  Always use your most recent med " list.                   Brand Name Dispense Instructions for use    amLODIPine 10 MG tablet    NORVASC    90 tablet    Take 1 tablet (10 mg) by mouth daily       cyclobenzaprine 5 MG tablet    FLEXERIL    30 tablet    Take 1-2 tablets (5-10 mg) by mouth 3 times daily as needed for muscle spasms       etoposide 50 MG capsule CHEMO     8 capsule    Take 4 capsules (200 mg) by mouth daily for 2 days Day 2 and 3.       lisinopril 5 MG tablet    PRINIVIL/ZESTRIL    90 tablet    Take 1 tablet (5 mg) by mouth daily       LORazepam 0.5 MG tablet    ATIVAN    30 tablet    Take 1 tablet (0.5 mg) by mouth every 4 hours as needed (Anxiety, Nausea/Vomiting or Sleep)       magnesium oxide 400 (241.3 MG) MG tablet    MAG-OX    120 tablet    Take 2 tablets (800 mg) by mouth 2 times daily       predniSONE 10 MG tablet    DELTASONE    21 tablet    Take 1 tablet (10 mg) by mouth daily       prochlorperazine 10 MG tablet    COMPAZINE    30 tablet    Take 1 tablet (10 mg) by mouth every 6 hours as needed (Nausea/Vomiting)       sulfamethoxazole-trimethoprim 400-80 MG per tablet    BACTRIM/SEPTRA    30 tablet    Take 1 tablet by mouth daily       traMADol 50 MG tablet    ULTRAM    45 tablet    Take 1 tablet (50 mg) by mouth every 8 hours as needed for moderate pain       traZODone 50 MG tablet    DESYREL    30 tablet    Take 0.5-2 tablets ( mg) by mouth At Bedtime       vitamin D 2000 UNITS tablet     100 tablet    Take 2,000 Units by mouth daily

## 2017-05-17 NOTE — NURSING NOTE
"Oncology Rooming Note    May 17, 2017 8:25 AM   Cricket Chen is a 36 year old male who presents for:    Chief Complaint   Patient presents with     Port Draw     labs drawn     Oncology Clinic Visit     PTLD F/U     Initial Vitals: /74  Pulse 74  Temp 97.4  F (36.3  C) (Oral)  Resp 18  Ht 1.778 m (5' 10\")  Wt 94.7 kg (208 lb 12.8 oz)  SpO2 96%  BMI 29.96 kg/m2 Estimated body mass index is 29.96 kg/(m^2) as calculated from the following:    Height as of this encounter: 1.778 m (5' 10\").    Weight as of this encounter: 94.7 kg (208 lb 12.8 oz). Body surface area is 2.16 meters squared.  No Pain (0) Comment: Data Unavailable   No LMP for male patient.  Allergies reviewed: Yes  Medications reviewed: Yes    Medications: MEDICATION REFILLS NEEDED TODAY. Provider was notified.  Pharmacy name entered into [facility]:    [facility] - [place], [place] - [address] [place] [address] [id number]  [facility] - [place], [place] - [address] [place] [address]    Clinical concerns: Ativan and Prednisone Gallo Durham was notified.    7 minutes for nursing intake (face to face time)     Deanna Chamorro LPN              "

## 2017-05-17 NOTE — MR AVS SNAPSHOT
After Visit Summary   5/17/2017    Cricket Chen    MRN: [id number]           Patient Information     Date Of Birth          1980        Visit Information        Provider Department      5/17/2017 9:30 AM UC 22 ATC; UC ONCOLOGY INFUSION [facility]        Today's Diagnoses     Post-transplant lymphoproliferative disorder (H)    -  1       Follow-ups after your visit        Your next 10 appointments already scheduled     Jun 07, 2017 10:30 AM CDT   [facility] Lab Draw with UC [place] LAB DRAW   [facility] Lab Draw ([facility])    [address] [place] [address]  [address] [facility] [address]   [phone]            Jun 07, 2017 11:10 AM CDT   (Arrive by 10:55 AM)   Return Visit with Krystina Durham PA-C   [facility] ([facility])    [address] [place] [address]  [address] [facility] [address]   [phone]            Jun 07, 2017 12:00 PM CDT   Infusion 360 with UC ONCOLOGY INFUSION, UC 15 ATC   [facility] ([facility])    [address] [place] [address]  [address] [facility] [address]   [phone]            Jun 26, 2017 11:00 AM CDT   Lab with UC LAB   [facility])    [address] [place] [address]  [address] [facility] [address]   [phone]            Jun 26, 2017 11:50 AM CDT   (Arrive by 11:35 AM)   Return Liver Transplant with Laureen Galvan MD   [facility] Hepatology ([facility])    [address] [place] [address]  [address] [facility] [address]   [phone]            Jul 05, 2017 12:45 PM CDT   PE NPET ONCOLOGY (EYES TO THIGHS) with UUPET1   [facility] PET CT ([facility])    [address] [facility] [address] [phone]           Tell your doctor:   If there is any chance you may be pregnant or if you are breastfeeding.   If  you have problems lying in small spaces (claustrophobia). If you do, your doctor may give you medicine to help you relax. If you have diabetes:   Have your exam early in the morning. Your blood glucose will go up as the day goes by.   Your glucose level must be 180 or less at the start of the exam. Please take any medicines you need to ensure this blood glucose level. 24 hours before your scan: Don t do any heavy exercise. (No jogging, aerobics or other workouts.) Exercise will make your pictures less accurate. 6 hours before your scan:   Stop all food and liquids (except water).   Do not chew gum or suck on mints.   If you need to take medicine with food, you may take it with a few crackers.  Please call your Imaging Department at your exam site with any questions.            Jul 06, 2017  9:00 AM CDT   [facility] Lab Draw with  [facility] LAB DRAW   [facility] ([facility])    [address] [place] [address] [phone]            Jul 06, 2017  9:30 AM CDT   RETURN ONC with Girish Narayanan MD   [facility] Blood and Marrow Transplant ([facility])    [address] [place] [address] [phone]              Who to contact     If you have questions or need follow up information about today's clinic visit or your schedule please contact [facility] directly at [phone].  Normal or non-critical lab and imaging results will be communicated to you by MyChart, letter or phone within 4 business days after the clinic has received the results. If you do not hear from us within 7 days, please contact the clinic through [employer] or phone. If you have a critical or abnormal lab result, we will notify you by phone as soon as possible.  Submit refill requests through [employer] or call your pharmacy and they will forward the refill request to us. Please allow 3 business days for your refill  to be completed.          Additional Information About Your Visit        [employer] Information     [employer] gives you secure access to your electronic health record. If you see a primary care provider, you can also send messages to your care team and make appointments. If you have questions, please call your primary care clinic.  If you do not have a primary care provider, please call [phone] and they will assist you.        Care EveryWhere ID     This is your Care EveryWhere ID. This could be used by other organizations to access your [facility] medical records  [id number]        Your Vitals Were     Pulse Temperature Pulse Oximetry BMI (Body Mass Index)          74 97.4  F (36.3  C) 96% 29.84 kg/m2         Blood Pressure from Last 3 Encounters:   05/17/17 136/74   05/17/17 136/74   04/29/17 138/73    Weight from Last 3 Encounters:   05/17/17 94.3 kg (208 lb)   05/17/17 94.7 kg (208 lb 12.8 oz)   04/26/17 94 kg (207 lb 4.8 oz)              We Performed the Following     CBC with platelets differential     Comprehensive metabolic panel          Today's Medication Changes          These changes are accurate as of: 5/17/17  3:33 PM.  If you have any questions, ask your nurse or doctor.               These medicines have changed or have updated prescriptions.        Dose/Directions    * etoposide 50 MG capsule CHEMO   This may have changed:  Another medication with the same name was added. Make sure you understand how and when to take each.   Used for:  Post-transplant lymphoproliferative disorder (H), Encounter for other specified aftercare        Dose:  100 mg/m2/day   Take 4 capsules (200 mg) by mouth daily for 2 days Day 2 and 3.   Quantity:  8 capsule   Refills:  0       * etoposide 50 MG capsule CHEMO   This may have changed:  You were already taking a medication with the same name, and this prescription was added. Make sure you understand how and when to take each.   Used for:  Post-transplant  lymphoproliferative disorder (H)        Dose:  100 mg/m2/day   Start taking on:  5/18/2017   Take 4 capsules (200 mg) by mouth daily for 2 days Day 2 and 3.   Quantity:  8 capsule   Refills:  0       * predniSONE 10 MG tablet   Commonly known as:  DELTASONE   This may have changed:  Another medication with the same name was added. Make sure you understand how and when to take each.   Used for:  Liver replaced by transplant (H), S/P kidney transplant   Changed by:  Pam Egan RN        Dose:  10 mg   Take 1 tablet (10 mg) by mouth daily   Quantity:  21 tablet   Refills:  3       * predniSONE 50 MG tablet   Commonly known as:  DELTASONE   This may have changed:  You were already taking a medication with the same name, and this prescription was added. Make sure you understand how and when to take each.   Used for:  Post-transplant lymphoproliferative disorder (H)        Dose:  50 mg   Take 1 tablet (50 mg) by mouth 2 times daily for 5 days Days 1 through 5.   Quantity:  10 tablet   Refills:  0       * Notice:  This list has 4 medication(s) that are the same as other medications prescribed for you. Read the directions carefully, and ask your doctor or other care provider to review them with you.         Where to get your medicines      These medications were sent to [facility] - [place], [place] - [address] [place] [address]  [address] [facility] [address]    Hours:  Mon-Fri 8:30am-5:00pm Toll Free [phone] Phone:  [phone]     etoposide 50 MG capsule CHEMO    predniSONE 50 MG tablet                Primary Care Provider Office Phone # Fax #    Eugenia Perez [phone]       [address]  [facility] [address]        Thank you!     Thank you for choosing [facility]  for your care. Our goal is always to provide you with excellent care. Hearing back from our patients is one way we can continue to  improve our services. Please take a few minutes to complete the written survey that you may receive in the mail after your visit with us. Thank you!             Your Updated Medication List - Protect others around you: Learn how to safely use, store and throw away your medicines at www.disposemymeds.org.          This list is accurate as of: 5/17/17  3:33 PM.  Always use your most recent med list.                   Brand Name Dispense Instructions for use    amLODIPine 10 MG tablet    NORVASC    90 tablet    Take 1 tablet (10 mg) by mouth daily       cyclobenzaprine 5 MG tablet    FLEXERIL    30 tablet    Take 1-2 tablets (5-10 mg) by mouth 3 times daily as needed for muscle spasms       * etoposide 50 MG capsule CHEMO     8 capsule    Take 4 capsules (200 mg) by mouth daily for 2 days Day 2 and 3.       * etoposide 50 MG capsule CHEMO   Start taking on:  5/18/2017     8 capsule    Take 4 capsules (200 mg) by mouth daily for 2 days Day 2 and 3.       lisinopril 5 MG tablet    PRINIVIL/ZESTRIL    90 tablet    Take 1 tablet (5 mg) by mouth daily       LORazepam 0.5 MG tablet    ATIVAN    30 tablet    Take 1 tablet (0.5 mg) by mouth every 4 hours as needed (Anxiety, Nausea/Vomiting or Sleep)       magnesium oxide 400 (241.3 MG) MG tablet    MAG-OX    120 tablet    Take 2 tablets (800 mg) by mouth 2 times daily       * predniSONE 10 MG tablet    DELTASONE    21 tablet    Take 1 tablet (10 mg) by mouth daily       * predniSONE 50 MG tablet    DELTASONE    10 tablet    Take 1 tablet (50 mg) by mouth 2 times daily for 5 days Days 1 through 5.       prochlorperazine 10 MG tablet    COMPAZINE    30 tablet    Take 1 tablet (10 mg) by mouth every 6 hours as needed (Nausea/Vomiting)       sulfamethoxazole-trimethoprim 400-80 MG per tablet    BACTRIM/SEPTRA    30 tablet    Take 1 tablet by mouth daily       traMADol 50 MG tablet    ULTRAM    45 tablet    Take 1 tablet (50 mg) by mouth every 8 hours as needed for moderate pain        traZODone 50 MG tablet    DESYREL    30 tablet    Take 0.5-2 tablets ( mg) by mouth At Bedtime       vitamin D 2000 UNITS tablet     100 tablet    Take 2,000 Units by mouth daily       * Notice:  This list has 4 medication(s) that are the same as other medications prescribed for you. Read the directions carefully, and ask your doctor or other care provider to review them with you.

## 2017-05-17 NOTE — PROGRESS NOTES
Infusion Nursing Note:  Cricket Chen presents today for D1 C3 Rituxan, Vincristine, Cytoxan, Etoposide.    Patient seen by provider today: Yes: BONNIE Alberto    Intravenous Access:  Implanted Port.    Treatment Conditions:  Lab Results   Component Value Date    HGB 12.5 05/17/2017    HGB 12.3 05/17/2017     Lab Results   Component Value Date    WBC 5.0 05/17/2017    WBC 5.1 05/17/2017      Lab Results   Component Value Date    ANEU 4.1 05/17/2017     Lab Results   Component Value Date    PLT 91 05/17/2017    PLT 93 05/17/2017      Lab Results   Component Value Date     05/17/2017     05/17/2017                   Lab Results   Component Value Date    POTASSIUM 3.6 05/17/2017    POTASSIUM 3.4 05/17/2017           Lab Results   Component Value Date    MAG 2.4 05/17/2017            Lab Results   Component Value Date    CR 1.15 05/17/2017    CR 1.12 05/17/2017                   Lab Results   Component Value Date    COLBY 8.8 05/17/2017    COLBY 8.3 05/17/2017                Lab Results   Component Value Date    BILITOTAL 0.3 05/17/2017    BILITOTAL 0.3 05/17/2017           Lab Results   Component Value Date    ALBUMIN 3.7 05/17/2017    ALBUMIN 3.6 05/17/2017                    Lab Results   Component Value Date    ALT 21 05/17/2017    ALT 20 05/17/2017           Lab Results   Component Value Date    AST 11 05/17/2017    AST 10 05/17/2017     Results reviewed, labs MET treatment parameters, ok to proceed with treatment.    Note:     Rituxan titration rates:  100ml/hr for 30 minutes  200ml/hr for 30 minutes  300ml/hr for 30 minutes  400ml/hr for remainder of infusion    Post Infusion Assessment:  Patient tolerated infusion without incident.  Blood return noted pre and post infusion.  Blood return noted during Vincristine administration every 2 cc in free flowing NS line.  Site patent and intact, free from redness, edema or discomfort.  No evidence of extravasations.  Access discontinued per  protocol.    Discharge Plan:   Prescription refills given for Ativan, Prednisone 10mg, Etoposide, Prednisione 50 mg.  Discharge instructions reviewed with: Patient.  Patient and/or family verbalized understanding of discharge instructions and all questions answered.  Copy of AVS reviewed with patient and/or family.  Patient will return 5/20/17for next appointment for neulasta.  Patient discharged in stable condition, accompanied by: self.    Melissa Quispe RN

## 2017-05-17 NOTE — LETTER
5/17/2017      RE: Cricket Chen  [address]  [place] [address]       Oncology/Hematology Visit Note  May 17, 2017    Reason for Visit: follow up of stage III PTLD EBV-positive monomorphic DLBCL    History of Present Illness: Cricket Chen is a 36 year old male with stage III PTLD EBV-positive monomorphic DLBCL. He has a prior history of end-stage liver disease and kidney disease, status post combined liver/kidney transplant back in 05/2016. He was maintained on immunosuppression with tacrolimus and CellCept, and was feeling well up until 12/2016 when he started to experience excessive fatigue and had noticed the emergence of a lump in his right neck. He was subsequently evaluated in January 2017 for persistent right neck lymphadenopathy and underwent a fine needle aspiration, which was largely nondiagnostic. He subsequently followed with excisional lymph node biopsy on 02/01/17, which was diagnostic for monomorphic PTLD/EBV-positive/diffuse large B-cell lymphoma of activated B-cell type, by Tejinder criteria (negative for CD10 and positive for MUM-1). The patient reported persistent fatigue, but no recent weight loss, fevers or drenching night sweats. He had noticed that the lump in the neck subsided after an excisional lymph node biopsy, but still persisted to some extent.       The patient also underwent reduction of immunosuppression with decrease in the dose of his tacrolimus from 4 mg in the morning and 3 mg in the evening to 3 mg b.i.d. His CellCept was also decreased from 750 mg twice a day to 250 mg twice a day.       He underwent a diagnostic PET/CT scan on 2/4/17 and bone marrow biopsy. His PET scan overall demonstrated relatively low burden of disease with no PET-active area in the neck, except from the thyroid gland. The patient was also found to have PET-avid right axillary lymphadenopathy measuring 1.3 cm.       There was no evidence of FDG-avid areas throughout the rest of his body. He  also underwent a diagnostic bone marrow biopsy, which was reported with no morphologic or immunophenotypic evidence of PTLD. FNA of the thyroid lesion on 3/16/17 showed papillary thyroid carcinoma. He started with 4 weeks doses of Rituxan on 2/10/17-3/3/17. CT neck/CAP on 3/28/17 showed a partial response. Therefore, he started on treatment with R-CEOP on 4/5/17. He was not given R-CHOP due to low ejection fraction of 30-35%. He had a port placed on 4/3/17. Cycle 2 was given on 4/26/17. Please see previous notes for further details on the patient's history. He comes in today for routine follow up prior to cycle 3 R-CEOP.    Interval History:  Patient reports that he did have muscle spasm again after Neulasta, though it was less severe and lasted for less time (2 hours) this time. He did take Claritin for about 4 days and also took a muscle relaxant. He is unsure if the muscle relaxant helped. He did have a couple of weeks of a cough and runny nose, which has since resolved. He is sleeping better lately. He tried trazodone, which helped a couple of nights, but then didn't help. He switched to Ativan, which he felt worked better for him. He denies other concerns.       Review of Systems:  Patient denies any of the following except if noted above: fevers, chills, vision or hearing changes, chest pain, dyspnea, abdominal pain, nausea, vomiting, diarrhea, constipation, urinary concerns, headaches, numbness, tingling, or issues with mood.  Answers for HPI/ROS submitted by the patient on 5/14/2017   General Symptoms: No  Skin Symptoms: No  HENT Symptoms: No  EYE SYMPTOMS: No  HEART SYMPTOMS: No  LUNG SYMPTOMS: No  INTESTINAL SYMPTOMS: No  URINARY SYMPTOMS: No  REPRODUCTIVE SYMPTOMS: No  SKELETAL SYMPTOMS: No  BLOOD SYMPTOMS: No  NERVOUS SYSTEM SYMPTOMS: No  MENTAL HEALTH SYMPTOMS: No      Current Outpatient Prescriptions   Medication Sig Dispense Refill     sulfamethoxazole-trimethoprim (BACTRIM/SEPTRA) 400-80 MG per  "tablet Take 1 tablet by mouth daily 30 tablet 11     traZODone (DESYREL) 50 MG tablet Take 0.5-2 tablets ( mg) by mouth At Bedtime 30 tablet 1     cyclobenzaprine (FLEXERIL) 5 MG tablet Take 1-2 tablets (5-10 mg) by mouth 3 times daily as needed for muscle spasms 30 tablet 3     etoposide 50 MG capsule CHEMO Take 4 capsules (200 mg) by mouth daily for 2 days Day 2 and 3. 8 capsule 0     predniSONE (DELTASONE) 10 MG tablet Take 1 tablet (10 mg) by mouth daily 21 tablet 3     LORazepam (ATIVAN) 0.5 MG tablet Take 1 tablet (0.5 mg) by mouth every 4 hours as needed (Anxiety, Nausea/Vomiting or Sleep) 30 tablet 5     prochlorperazine (COMPAZINE) 10 MG tablet Take 1 tablet (10 mg) by mouth every 6 hours as needed (Nausea/Vomiting) 30 tablet 5     traMADol (ULTRAM) 50 MG tablet Take 1 tablet (50 mg) by mouth every 8 hours as needed for moderate pain 45 tablet 0     Cholecalciferol (VITAMIN D) 2000 UNITS tablet Take 2,000 Units by mouth daily 100 tablet 3     magnesium oxide (MAG-OX) 400 (241.3 MG) MG tablet Take 2 tablets (800 mg) by mouth 2 times daily 120 tablet 3     amLODIPine (NORVASC) 10 MG tablet Take 1 tablet (10 mg) by mouth daily 90 tablet 3     lisinopril (PRINIVIL,ZESTRIL) 5 MG tablet Take 1 tablet (5 mg) by mouth daily 90 tablet 3     Physical Examination:  General: The patient is a pleasant male in no acute distress.  /74  Pulse 74  Temp 97.4  F (36.3  C) (Oral)  Resp 18  Ht 1.778 m (5' 10\")  Wt 94.7 kg (208 lb 12.8 oz)  SpO2 96%  BMI 29.96 kg/m2  Wt Readings from Last 10 Encounters:   05/17/17 94.7 kg (208 lb 12.8 oz)   04/26/17 94 kg (207 lb 4.8 oz)   04/26/17 94 kg (207 lb 4.8 oz)   04/18/17 93.3 kg (205 lb 11.2 oz)   04/05/17 93.6 kg (206 lb 6.4 oz)   04/03/17 90.7 kg (200 lb)   03/30/17 93.6 kg (206 lb 5.6 oz)   03/02/17 91.3 kg (201 lb 3.2 oz)   02/25/17 90.8 kg (200 lb 1.6 oz)   02/24/17 91.1 kg (200 lb 13.4 oz)   HEENT: EOMI, PERRL. Sclerae are anicteric. Oral mucosa is pink and " moist with no lesions or thrush.   Lymph: Neck is supple with no lymphadenopathy in the cervical or supraclavicular areas.   Heart: Regular rate and rhythm.   Lungs: Clear to auscultation bilaterally.   Abdomen: Bowel sounds present, soft, nontender with no palpable hepatosplenomegaly or masses.   Extremities: No lower extremity edema noted bilaterally.   Neuro: Cranial nerves II through XII are grossly intact.  Skin: No rashes, petechiae, or bruising noted on exposed skin.    Laboratory Data:   5/17/2017 08:06   Sodium 144   Potassium 3.6   Chloride 110 (H)   Carbon Dioxide 24   Urea Nitrogen 18   Creatinine 1.15   GFR Estimate 72   GFR Estimate If Black 87   Calcium 8.8   Anion Gap 11   Albumin 3.7   Protein Total 6.5 (L)   Bilirubin Total 0.3   Alkaline Phosphatase 72   ALT 21   AST 11   Glucose 128 (H)   WBC 5.0   Hemoglobin 12.5 (L)   Hematocrit 38.0 (L)   Platelet Count 91 (L)   RBC Count 4.03 (L)   MCV 94   MCH 31.0   MCHC 32.9   RDW 14.4   Diff Method Automated Method   % Neutrophils 82.2   % Lymphocytes 8.0   % Monocytes 7.6   % Eosinophils 0.8   % Basophils 0.6   % Immature Granulocytes 0.8   Nucleated RBCs 0   Absolute Neutrophil 4.1   Absolute Lymphocytes 0.4 (L)   Absolute Monocytes 0.4   Absolute Eosinophils 0.0   Absolute Basophils 0.0   Abs Immature Granulocytes 0.0   Absolute Nucleated RBC 0.0     Assessment and Plan:  1. Stage III PTLD EBV-positive monomorphic DLBCL. Patient received 4 weekly doses of Rituxan with a partial response. He then started on R-CEOP with Neulasta support on 4/5/17. Etoposide is given IV on day 1 and oral on days 2 and 3. He tolerated this fairly well with some back and hip pain/spasm associated with Neulasta. Cycle 2 went well. The Neulasta associated back spasms were less severe with cycle 2. He will continue with cycle 3 today. He will follow up with me prior to cycle 4. We are planning on 4 cycles of R-CEOP. We will repeat a PET/CT about a month after cycle 4.    2.  Papillary thyroid carcinoma. Will defer surgical resection until after completion of 4 cycles of R-CEOP.     3. History of kidney and liver transplant. No current concerns for rejection. Remains on prednisone 10 mg daily. He is off Prograf and Cellcept due to chemotherapy.     4. EBV viremia. Resolved after 4 weekly doses of Rituxan. Last level on 3/8/17 undetectable.     5. Hypertension. BP is under good control. He remains on amlodipine 10 mg qhs and lisinopril 5 mg daily.     6. Back/hip muscle spasm. Recommend using Claritin and Flexeril if recurs with Neulasta.     7. Insomnia. Okay to use trazodone or Ativan.     Krystina Durham PA-C  [facility]  [address] [place], [place] [id number] [phone]

## 2017-05-17 NOTE — NURSING NOTE
Chief Complaint   Patient presents with     Port Draw     labs drawn     Port accessed, labs drawn. Port flushed with 20 cc NS and locked with heparin.      Chiquita Arango

## 2017-05-17 NOTE — PROGRESS NOTES
Oncology/Hematology Visit Note  May 17, 2017    Reason for Visit: follow up of stage III PTLD EBV-positive monomorphic DLBCL    History of Present Illness: Cricket Chen is a 36 year old male with stage III PTLD EBV-positive monomorphic DLBCL. He has a prior history of end-stage liver disease and kidney disease, status post combined liver/kidney transplant back in 05/2016. He was maintained on immunosuppression with tacrolimus and CellCept, and was feeling well up until 12/2016 when he started to experience excessive fatigue and had noticed the emergence of a lump in his right neck. He was subsequently evaluated in January 2017 for persistent right neck lymphadenopathy and underwent a fine needle aspiration, which was largely nondiagnostic. He subsequently followed with excisional lymph node biopsy on 02/01/17, which was diagnostic for monomorphic PTLD/EBV-positive/diffuse large B-cell lymphoma of activated B-cell type, by Tejinder criteria (negative for CD10 and positive for MUM-1). The patient reported persistent fatigue, but no recent weight loss, fevers or drenching night sweats. He had noticed that the lump in the neck subsided after an excisional lymph node biopsy, but still persisted to some extent.       The patient also underwent reduction of immunosuppression with decrease in the dose of his tacrolimus from 4 mg in the morning and 3 mg in the evening to 3 mg b.i.d. His CellCept was also decreased from 750 mg twice a day to 250 mg twice a day.       He underwent a diagnostic PET/CT scan on 2/4/17 and bone marrow biopsy. His PET scan overall demonstrated relatively low burden of disease with no PET-active area in the neck, except from the thyroid gland. The patient was also found to have PET-avid right axillary lymphadenopathy measuring 1.3 cm.       There was no evidence of FDG-avid areas throughout the rest of his body. He also underwent a diagnostic bone marrow biopsy, which was reported with no morphologic  or immunophenotypic evidence of PTLD. FNA of the thyroid lesion on 3/16/17 showed papillary thyroid carcinoma. He started with 4 weeks doses of Rituxan on 2/10/17-3/3/17. CT neck/CAP on 3/28/17 showed a partial response. Therefore, he started on treatment with R-CEOP on 4/5/17. He was not given R-CHOP due to low ejection fraction of 30-35%. He had a port placed on 4/3/17. Cycle 2 was given on 4/26/17. Please see previous notes for further details on the patient's history. He comes in today for routine follow up prior to cycle 3 R-CEOP.    Interval History:  Patient reports that he did have muscle spasm again after Neulasta, though it was less severe and lasted for less time (2 hours) this time. He did take Claritin for about 4 days and also took a muscle relaxant. He is unsure if the muscle relaxant helped. He did have a couple of weeks of a cough and runny nose, which has since resolved. He is sleeping better lately. He tried trazodone, which helped a couple of nights, but then didn't help. He switched to Ativan, which he felt worked better for him. He denies other concerns.       Review of Systems:  Patient denies any of the following except if noted above: fevers, chills, vision or hearing changes, chest pain, dyspnea, abdominal pain, nausea, vomiting, diarrhea, constipation, urinary concerns, headaches, numbness, tingling, or issues with mood.  Answers for HPI/ROS submitted by the patient on 5/14/2017   General Symptoms: No  Skin Symptoms: No  HENT Symptoms: No  EYE SYMPTOMS: No  HEART SYMPTOMS: No  LUNG SYMPTOMS: No  INTESTINAL SYMPTOMS: No  URINARY SYMPTOMS: No  REPRODUCTIVE SYMPTOMS: No  SKELETAL SYMPTOMS: No  BLOOD SYMPTOMS: No  NERVOUS SYSTEM SYMPTOMS: No  MENTAL HEALTH SYMPTOMS: No      Current Outpatient Prescriptions   Medication Sig Dispense Refill     sulfamethoxazole-trimethoprim (BACTRIM/SEPTRA) 400-80 MG per tablet Take 1 tablet by mouth daily 30 tablet 11     traZODone (DESYREL) 50 MG tablet Take  "0.5-2 tablets ( mg) by mouth At Bedtime 30 tablet 1     cyclobenzaprine (FLEXERIL) 5 MG tablet Take 1-2 tablets (5-10 mg) by mouth 3 times daily as needed for muscle spasms 30 tablet 3     etoposide 50 MG capsule CHEMO Take 4 capsules (200 mg) by mouth daily for 2 days Day 2 and 3. 8 capsule 0     predniSONE (DELTASONE) 10 MG tablet Take 1 tablet (10 mg) by mouth daily 21 tablet 3     LORazepam (ATIVAN) 0.5 MG tablet Take 1 tablet (0.5 mg) by mouth every 4 hours as needed (Anxiety, Nausea/Vomiting or Sleep) 30 tablet 5     prochlorperazine (COMPAZINE) 10 MG tablet Take 1 tablet (10 mg) by mouth every 6 hours as needed (Nausea/Vomiting) 30 tablet 5     traMADol (ULTRAM) 50 MG tablet Take 1 tablet (50 mg) by mouth every 8 hours as needed for moderate pain 45 tablet 0     Cholecalciferol (VITAMIN D) 2000 UNITS tablet Take 2,000 Units by mouth daily 100 tablet 3     magnesium oxide (MAG-OX) 400 (241.3 MG) MG tablet Take 2 tablets (800 mg) by mouth 2 times daily 120 tablet 3     amLODIPine (NORVASC) 10 MG tablet Take 1 tablet (10 mg) by mouth daily 90 tablet 3     lisinopril (PRINIVIL,ZESTRIL) 5 MG tablet Take 1 tablet (5 mg) by mouth daily 90 tablet 3     Physical Examination:  General: The patient is a pleasant male in no acute distress.  /74  Pulse 74  Temp 97.4  F (36.3  C) (Oral)  Resp 18  Ht 1.778 m (5' 10\")  Wt 94.7 kg (208 lb 12.8 oz)  SpO2 96%  BMI 29.96 kg/m2  Wt Readings from Last 10 Encounters:   05/17/17 94.7 kg (208 lb 12.8 oz)   04/26/17 94 kg (207 lb 4.8 oz)   04/26/17 94 kg (207 lb 4.8 oz)   04/18/17 93.3 kg (205 lb 11.2 oz)   04/05/17 93.6 kg (206 lb 6.4 oz)   04/03/17 90.7 kg (200 lb)   03/30/17 93.6 kg (206 lb 5.6 oz)   03/02/17 91.3 kg (201 lb 3.2 oz)   02/25/17 90.8 kg (200 lb 1.6 oz)   02/24/17 91.1 kg (200 lb 13.4 oz)   HEENT: EOMI, PERRL. Sclerae are anicteric. Oral mucosa is pink and moist with no lesions or thrush.   Lymph: Neck is supple with no lymphadenopathy in the " cervical or supraclavicular areas.   Heart: Regular rate and rhythm.   Lungs: Clear to auscultation bilaterally.   Abdomen: Bowel sounds present, soft, nontender with no palpable hepatosplenomegaly or masses.   Extremities: No lower extremity edema noted bilaterally.   Neuro: Cranial nerves II through XII are grossly intact.  Skin: No rashes, petechiae, or bruising noted on exposed skin.    Laboratory Data:   5/17/2017 08:06   Sodium 144   Potassium 3.6   Chloride 110 (H)   Carbon Dioxide 24   Urea Nitrogen 18   Creatinine 1.15   GFR Estimate 72   GFR Estimate If Black 87   Calcium 8.8   Anion Gap 11   Albumin 3.7   Protein Total 6.5 (L)   Bilirubin Total 0.3   Alkaline Phosphatase 72   ALT 21   AST 11   Glucose 128 (H)   WBC 5.0   Hemoglobin 12.5 (L)   Hematocrit 38.0 (L)   Platelet Count 91 (L)   RBC Count 4.03 (L)   MCV 94   MCH 31.0   MCHC 32.9   RDW 14.4   Diff Method Automated Method   % Neutrophils 82.2   % Lymphocytes 8.0   % Monocytes 7.6   % Eosinophils 0.8   % Basophils 0.6   % Immature Granulocytes 0.8   Nucleated RBCs 0   Absolute Neutrophil 4.1   Absolute Lymphocytes 0.4 (L)   Absolute Monocytes 0.4   Absolute Eosinophils 0.0   Absolute Basophils 0.0   Abs Immature Granulocytes 0.0   Absolute Nucleated RBC 0.0     Assessment and Plan:  1. Stage III PTLD EBV-positive monomorphic DLBCL. Patient received 4 weekly doses of Rituxan with a partial response. He then started on R-CEOP with Neulasta support on 4/5/17. Etoposide is given IV on day 1 and oral on days 2 and 3. He tolerated this fairly well with some back and hip pain/spasm associated with Neulasta. Cycle 2 went well. The Neulasta associated back spasms were less severe with cycle 2. He will continue with cycle 3 today. He will follow up with me prior to cycle 4. We are planning on 4 cycles of R-CEOP. We will repeat a PET/CT about a month after cycle 4.    2. Papillary thyroid carcinoma. Will defer surgical resection until after completion of 4  cycles of R-CEOP.     3. History of kidney and liver transplant. No current concerns for rejection. Remains on prednisone 10 mg daily. He is off Prograf and Cellcept due to chemotherapy.     4. EBV viremia. Resolved after 4 weekly doses of Rituxan. Last level on 3/8/17 undetectable.     5. Hypertension. BP is under good control. He remains on amlodipine 10 mg qhs and lisinopril 5 mg daily.     6. Back/hip muscle spasm. Recommend using Claritin and Flexeril if recurs with Neulasta.     7. Insomnia. Okay to use trazodone or Ativan.     Krystina Durham PA-C  [facility]  [address] [place] [address] [phone]    Addendum: Plan for PET/CT 6-8 weeks after cycle 4 R-CEOP. Schedule will be adjusted.

## 2017-05-20 ENCOUNTER — ALLIED HEALTH/NURSE VISIT (OUTPATIENT)
Dept: ONCOLOGY | Facility: CLINIC | Age: 37
End: 2017-05-20
Attending: INTERNAL MEDICINE
Payer: COMMERCIAL

## 2017-05-20 VITALS
SYSTOLIC BLOOD PRESSURE: 126 MMHG | DIASTOLIC BLOOD PRESSURE: 69 MMHG | HEART RATE: 70 BPM | RESPIRATION RATE: 16 BRPM | OXYGEN SATURATION: 99 % | WEIGHT: 208.8 LBS | BODY MASS INDEX: 29.96 KG/M2 | TEMPERATURE: 98.9 F

## 2017-05-20 DIAGNOSIS — D47.Z1 POST-TRANSPLANT LYMPHOPROLIFERATIVE DISORDER (H): Primary | ICD-10-CM

## 2017-05-20 PROCEDURE — 96372 THER/PROPH/DIAG INJ SC/IM: CPT

## 2017-05-20 PROCEDURE — 25000128 H RX IP 250 OP 636: Mod: ZF | Performed by: PHYSICIAN ASSISTANT

## 2017-05-20 RX ADMIN — PEGFILGRASTIM 6 MG: 6 INJECTION SUBCUTANEOUS at 11:42

## 2017-05-20 ASSESSMENT — PAIN SCALES - GENERAL: PAINLEVEL: NO PAIN (0)

## 2017-05-20 NOTE — PROGRESS NOTES
Infusion Nursing Note:    Patient presents today for Neulasta injection.     Note: Pt reports feeling well, denies fevers, nausea or vomiting.    Intravenous Access:  No Intravenous access/labs at this visit.    Post Infusion Assessment:  Patient tolerated injection without incident. Given by LPN.    Discharge Plan:   Copy of AVS reviewed with patient and/or family.  Patient will return 6/7 for next appointment.

## 2017-05-20 NOTE — MR AVS SNAPSHOT
After Visit Summary   5/20/2017    Cricket Chen    MRN: [id number]           Patient Information     Date Of Birth          1980        Visit Information        Provider Department      5/20/2017 11:30 AM Nurse, Destiny Oncology Injection [facility]        Today's Diagnoses     Post-transplant lymphoproliferative disorder (H)    -  1      Care Instructions    Contact Numbers    [facility] Main Line: [phone]  [facility] Triage:  [phone]    Call triage with chills and/or temperature greater than or equal to 100.5, uncontrolled nausea/vomiting, diarrhea, constipation, dizziness, shortness of breath, chest pain, bleeding, unexplained bruising, or any new/concerning symptoms, questions/concerns.     If you are having any concerning symptoms or wish to speak to a provider before your next infusion visit, please call your care coordinator or triage to notify them so we can adequately serve you.       After Hours: [phone]    If after hours, weekends, or holidays, call main hospital  and ask for Oncology doctor on call.           May 2017   Andre Monday Tuesday Wednesday Thursday Friday Saturday        1     2     3     4     5     6       7     8     9     10     11     12     13       14     15     16     17     [facility] DRAW    8:00 AM   (15 min.)    [facility] LAB DRAW   [facility] Lab Draw     [facility] RETURN    8:25 AM   (50 min.)   Krystina Durham PA-C   [facility]     [facility] ONC INFUSION 360    9:30 AM   (360 min.)   [facility] ONCOLOGY INFUSION   [facility] 18     19     20     [facility] INJECTION   11:15 AM   (15 min.)   Nurse, Destiny Oncology Injection   [facility]   21     22     23     24     25     26     27       28     29     30     31 June 2017 Sunday Monday Tuesday Wednesday Thursday Friday Saturday                       1     2     3       4     5     6     7     [facility]  DRAW   10:30 AM   (15 min.)   [facility] DRAW   [facility] Lab Draw     [facility] RETURN   10:55 AM   (50 min.)   Krystina Durham PA-C   [facility] ONC INFUSION 360   12:00 PM   (360 min.)   UC ONCOLOGY INFUSION   [facility] 8     9     10       11     12     13     14     15     16     17       18     19     20     21     22     23     24       25     26     LAB WITH  CLINIC   11:00 AM   (15 min.)   UC LAB   [facility] Lab     [facility] LIVER TX RETURN   11:35 AM   (20 min.)   Laureen Galvan MD   [facility] Hepatology 27     28     29     30                    No results found for this or any previous visit (from the past 24 hour(s)).          Follow-ups after your visit        Your next 10 appointments already scheduled     Jun 07, 2017 10:30 AM CDT   [facility] Lab Draw with  [facility] DRAW   [facility])    [address]  2nd Floor  [facility] [address]   [phone]            Jun 07, 2017 11:10 AM CDT   (Arrive by 10:55 AM)   Return Visit with Krystina Durham PA-C   [facility] ([facility])    [address]  2nd Floor  [place] [facility] [address]   [phone]            Jun 07, 2017 12:00 PM CDT   Infusion 360 with UC ONCOLOGY INFUSION, UC 15 ATC   [facility] ([facility])    [address]  2nd Floor  [facility] [facility] [address]   [phone]            Jun 26, 2017 11:00 AM CDT   Lab with  LAB   [facility] Lab ([facility])    [address]  1st Floor  [facility] [address]   [phone]            Jun 26, 2017 11:50 AM CDT   (Arrive by 11:35 AM)   Return Liver Transplant with Laureen Galvan MD   [facility] Hepatology ([facility])    [address]  3rd Floor  [facility] [facility] [address]   [phone]             Jul 05, 2017 12:45 PM CDT   PE NPET ONCOLOGY (EYES TO THIGHS) with UUPET1   [facility] [place] PET CT ([facility])    [address] [id number] [phone]           Tell your doctor:   If there is any chance you may be pregnant or if you are breastfeeding.   If you have problems lying in small spaces (claustrophobia). If you do, your doctor may give you medicine to help you relax. If you have diabetes:   Have your exam early in the morning. Your blood glucose will go up as the day goes by.   Your glucose level must be 180 or less at the start of the exam. Please take any medicines you need to ensure this blood glucose level. 24 hours before your scan: Don t do any heavy exercise. (No jogging, aerobics or other workouts.) Exercise will make your pictures less accurate. 6 hours before your scan:   Stop all food and liquids (except water).   Do not chew gum or suck on mints.   If you need to take medicine with food, you may take it with a few crackers.  Please call your Imaging Department at your exam site with any questions.            Jul 06, 2017  9:00 AM CDT   [facility] Lab Draw with  [facility] LAB DRAW   [facility] Lab Draw ([facility])    [address] [place] [address] [phone]            Jul 06, 2017  9:30 AM CDT   RETURN ONC with Girish Narayanan MD   [facility] Blood and Marrow Transplant ([facility])    [address] [place] [address] [phone]              Who to contact     If you have questions or need follow up information about today's clinic visit or your schedule please contact [facility] directly at [phone].  Normal or non-critical lab and imaging results will be communicated to you by MyChart, letter or phone within 4 business days after the clinic has received the results. If you do  not hear from us within 7 days, please contact the clinic through [employer] or phone. If you have a critical or abnormal lab result, we will notify you by phone as soon as possible.  Submit refill requests through [employer] or call your pharmacy and they will forward the refill request to us. Please allow 3 business days for your refill to be completed.          Additional Information About Your Visit        [employer] Information     [employer] gives you secure access to your electronic health record. If you see a primary care provider, you can also send messages to your care team and make appointments. If you have questions, please call your primary care clinic.  If you do not have a primary care provider, please call [phone] and they will assist you.        Care EveryWhere ID     This is your Care EveryWhere ID. This could be used by other organizations to access your [facility] medical records  [id number]        Your Vitals Were     Pulse Temperature Respirations Pulse Oximetry BMI (Body Mass Index)       70 98.9  F (37.2  C) (Oral) 16 99% 29.96 kg/m2        Blood Pressure from Last 3 Encounters:   05/20/17 126/69   05/17/17 136/74   05/17/17 136/74    Weight from Last 3 Encounters:   05/20/17 94.7 kg (208 lb 12.8 oz)   05/17/17 94.3 kg (208 lb)   05/17/17 94.7 kg (208 lb 12.8 oz)              Today, you had the following     No orders found for display       Primary Care Provider Office Phone # Fax #    Eugenia Perez [phone]       [address] [address]        Thank you!     Thank you for choosing [facility]  for your care. Our goal is always to provide you with excellent care. Hearing back from our patients is one way we can continue to improve our services. Please take a few minutes to complete the written survey that you may receive in the mail after your visit with us. Thank you!             Your Updated Medication List -  Protect others around you: Learn how to safely use, store and throw away your medicines at www.disposemymeds.org.          This list is accurate as of: 5/20/17 11:38 AM.  Always use your most recent med list.                   Brand Name Dispense Instructions for use    amLODIPine 10 MG tablet    NORVASC    90 tablet    Take 1 tablet (10 mg) by mouth daily       cyclobenzaprine 5 MG tablet    FLEXERIL    30 tablet    Take 1-2 tablets (5-10 mg) by mouth 3 times daily as needed for muscle spasms       etoposide 50 MG capsule CHEMO     8 capsule    Take 4 capsules (200 mg) by mouth daily for 2 days Day 2 and 3.       lisinopril 5 MG tablet    PRINIVIL/ZESTRIL    90 tablet    Take 1 tablet (5 mg) by mouth daily       LORazepam 0.5 MG tablet    ATIVAN    30 tablet    Take 1 tablet (0.5 mg) by mouth every 4 hours as needed (Anxiety, Nausea/Vomiting or Sleep)       magnesium oxide 400 (241.3 MG) MG tablet    MAG-OX    120 tablet    Take 2 tablets (800 mg) by mouth 2 times daily       * predniSONE 10 MG tablet    DELTASONE    21 tablet    Take 1 tablet (10 mg) by mouth daily       * predniSONE 50 MG tablet    DELTASONE    10 tablet    Take 1 tablet (50 mg) by mouth 2 times daily for 5 days Days 1 through 5.       prochlorperazine 10 MG tablet    COMPAZINE    30 tablet    Take 1 tablet (10 mg) by mouth every 6 hours as needed (Nausea/Vomiting)       sulfamethoxazole-trimethoprim 400-80 MG per tablet    BACTRIM/SEPTRA    30 tablet    Take 1 tablet by mouth daily       traMADol 50 MG tablet    ULTRAM    45 tablet    Take 1 tablet (50 mg) by mouth every 8 hours as needed for moderate pain       traZODone 50 MG tablet    DESYREL    30 tablet    Take 0.5-2 tablets ( mg) by mouth At Bedtime       vitamin D 2000 UNITS tablet     100 tablet    Take 2,000 Units by mouth daily       * Notice:  This list has 2 medication(s) that are the same as other medications prescribed for you. Read the directions carefully, and ask your  doctor or other care provider to review them with you.

## 2017-05-20 NOTE — PATIENT INSTRUCTIONS
Contact Numbers    [facility] Main Line: [phone]  [facility] Triage:  [phone]    Call triage with chills and/or temperature greater than or equal to 100.5, uncontrolled nausea/vomiting, diarrhea, constipation, dizziness, shortness of breath, chest pain, bleeding, unexplained bruising, or any new/concerning symptoms, questions/concerns.     If you are having any concerning symptoms or wish to speak to a provider before your next infusion visit, please call your care coordinator or triage to notify them so we can adequately serve you.       After Hours: [phone]    If after hours, weekends, or holidays, call main hospital  and ask for Oncology doctor on call.           May 2017   Andre Monday Tuesday Wednesday Thursday Friday Saturday        1     2     3     4     5     6       7     8     9     10     11     12     13       14     15     16     17     [facility] DRAW    8:00 AM   (15 min.)    [facility] LAB DRAW   [facility] Lab Draw     [facility] RETURN    8:25 AM   (50 min.)   Krystina Durham PA-C   [facility]    9:30 AM   (360 min.)    ONCOLOGY INFUSION   [facility] 18     19     20     [facility] INJECTION   11:15 AM   (15 min.)   Nurse,  Oncology Injection   [facility]   21     22     23     24     25     26     27       28     29     30     31                               June 2017 Sunday Monday Tuesday Wednesday Thursday Friday Saturday                       1     2     3       4     5     6     7     [facility] DRAW   10:30 AM   (15 min.)    [facility] LAB DRAW   [facility] Lab Draw     [facility] RETURN   10:55 AM   (50 min.)   Krystina Durham PA-C   [facility]     [facility] ONC INFUSION 360   12:00 PM   (360 min.)    ONCOLOGY INFUSION   [facility] 8     9     10       11     12     13     14     15     16     17       18     19     20     21     22     23      24       25     26     LAB WITH HB CLINIC   11:00 AM   (15 min.)    LAB    Health Lab     P LIVER TX RETURN   11:35 AM   (20 min.)   Laureen Galvan MD   [facility] Hepatology 27     28     29     30                    No results found for this or any previous visit (from the past 24 hour(s)).

## 2017-06-07 ENCOUNTER — INFUSION THERAPY VISIT (OUTPATIENT)
Dept: ONCOLOGY | Facility: CLINIC | Age: 37
End: 2017-06-07
Attending: INTERNAL MEDICINE
Payer: COMMERCIAL

## 2017-06-07 VITALS
DIASTOLIC BLOOD PRESSURE: 67 MMHG | SYSTOLIC BLOOD PRESSURE: 126 MMHG | BODY MASS INDEX: 29.34 KG/M2 | WEIGHT: 204.5 LBS | TEMPERATURE: 98.3 F | OXYGEN SATURATION: 98 % | RESPIRATION RATE: 16 BRPM | HEART RATE: 81 BPM

## 2017-06-07 DIAGNOSIS — C73 PAPILLARY THYROID CARCINOMA (H): ICD-10-CM

## 2017-06-07 DIAGNOSIS — D47.Z1 POST-TRANSPLANT LYMPHOPROLIFERATIVE DISORDER (H): Primary | ICD-10-CM

## 2017-06-07 DIAGNOSIS — Z94.4 LIVER REPLACED BY TRANSPLANT (H): ICD-10-CM

## 2017-06-07 LAB
ALBUMIN SERPL-MCNC: 3.9 G/DL (ref 3.4–5)
ALP SERPL-CCNC: 71 U/L (ref 40–150)
ALT SERPL W P-5'-P-CCNC: 21 U/L (ref 0–70)
ANION GAP SERPL CALCULATED.3IONS-SCNC: 8 MMOL/L (ref 3–14)
AST SERPL W P-5'-P-CCNC: 14 U/L (ref 0–45)
BASOPHILS # BLD AUTO: 0 10E9/L (ref 0–0.2)
BASOPHILS NFR BLD AUTO: 0.8 %
BILIRUB SERPL-MCNC: 0.8 MG/DL (ref 0.2–1.3)
BUN SERPL-MCNC: 21 MG/DL (ref 7–30)
CALCIUM SERPL-MCNC: 8.9 MG/DL (ref 8.5–10.1)
CHLORIDE SERPL-SCNC: 108 MMOL/L (ref 94–109)
CO2 SERPL-SCNC: 24 MMOL/L (ref 20–32)
CREAT SERPL-MCNC: 1.15 MG/DL (ref 0.66–1.25)
DIFFERENTIAL METHOD BLD: ABNORMAL
EOSINOPHIL # BLD AUTO: 0 10E9/L (ref 0–0.7)
EOSINOPHIL NFR BLD AUTO: 1 %
ERYTHROCYTE [DISTWIDTH] IN BLOOD BY AUTOMATED COUNT: 14.2 % (ref 10–15)
GFR SERPL CREATININE-BSD FRML MDRD: 72 ML/MIN/1.7M2
GLUCOSE SERPL-MCNC: 87 MG/DL (ref 70–99)
HCT VFR BLD AUTO: 39.1 % (ref 40–53)
HGB BLD-MCNC: 12.6 G/DL (ref 13.3–17.7)
IMM GRANULOCYTES # BLD: 0 10E9/L (ref 0–0.4)
IMM GRANULOCYTES NFR BLD: 0.3 %
LYMPHOCYTES # BLD AUTO: 0.3 10E9/L (ref 0.8–5.3)
LYMPHOCYTES NFR BLD AUTO: 6.5 %
MCH RBC QN AUTO: 30.7 PG (ref 26.5–33)
MCHC RBC AUTO-ENTMCNC: 32.2 G/DL (ref 31.5–36.5)
MCV RBC AUTO: 95 FL (ref 78–100)
MONOCYTES # BLD AUTO: 0.4 10E9/L (ref 0–1.3)
MONOCYTES NFR BLD AUTO: 11.3 %
NEUTROPHILS # BLD AUTO: 3.1 10E9/L (ref 1.6–8.3)
NEUTROPHILS NFR BLD AUTO: 80.1 %
NRBC # BLD AUTO: 0 10*3/UL
NRBC BLD AUTO-RTO: 0 /100
PLATELET # BLD AUTO: 87 10E9/L (ref 150–450)
POTASSIUM SERPL-SCNC: 3.8 MMOL/L (ref 3.4–5.3)
PROT SERPL-MCNC: 6.8 G/DL (ref 6.8–8.8)
RBC # BLD AUTO: 4.11 10E12/L (ref 4.4–5.9)
SODIUM SERPL-SCNC: 140 MMOL/L (ref 133–144)
WBC # BLD AUTO: 3.8 10E9/L (ref 4–11)

## 2017-06-07 PROCEDURE — 96411 CHEMO IV PUSH ADDL DRUG: CPT

## 2017-06-07 PROCEDURE — 25000132 ZZH RX MED GY IP 250 OP 250 PS 637: Mod: ZF | Performed by: PHYSICIAN ASSISTANT

## 2017-06-07 PROCEDURE — 36415 COLL VENOUS BLD VENIPUNCTURE: CPT | Performed by: PHYSICIAN ASSISTANT

## 2017-06-07 PROCEDURE — 25000128 H RX IP 250 OP 636: Mod: ZF | Performed by: PHYSICIAN ASSISTANT

## 2017-06-07 PROCEDURE — 96417 CHEMO IV INFUS EACH ADDL SEQ: CPT

## 2017-06-07 PROCEDURE — 80053 COMPREHEN METABOLIC PANEL: CPT | Performed by: PHYSICIAN ASSISTANT

## 2017-06-07 PROCEDURE — 96375 TX/PRO/DX INJ NEW DRUG ADDON: CPT

## 2017-06-07 PROCEDURE — 96367 TX/PROPH/DG ADDL SEQ IV INF: CPT

## 2017-06-07 PROCEDURE — 96413 CHEMO IV INFUSION 1 HR: CPT

## 2017-06-07 PROCEDURE — 85025 COMPLETE CBC W/AUTO DIFF WBC: CPT | Performed by: PHYSICIAN ASSISTANT

## 2017-06-07 PROCEDURE — 25000125 ZZHC RX 250: Mod: ZF | Performed by: PHYSICIAN ASSISTANT

## 2017-06-07 PROCEDURE — 99214 OFFICE O/P EST MOD 30 MIN: CPT | Mod: ZP | Performed by: PHYSICIAN ASSISTANT

## 2017-06-07 PROCEDURE — 99211 OFF/OP EST MAY X REQ PHY/QHP: CPT | Mod: ZF

## 2017-06-07 RX ORDER — DIPHENHYDRAMINE HYDROCHLORIDE 50 MG/ML
50 INJECTION INTRAMUSCULAR; INTRAVENOUS
Status: CANCELLED
Start: 2017-06-07

## 2017-06-07 RX ORDER — SODIUM CHLORIDE 9 MG/ML
1000 INJECTION, SOLUTION INTRAVENOUS CONTINUOUS PRN
Status: CANCELLED
Start: 2017-06-07

## 2017-06-07 RX ORDER — ACETAMINOPHEN 325 MG/1
650 TABLET ORAL ONCE
Status: CANCELLED
Start: 2017-06-07 | End: 2017-06-07

## 2017-06-07 RX ORDER — HEPARIN SODIUM (PORCINE) LOCK FLUSH IV SOLN 100 UNIT/ML 100 UNIT/ML
5 SOLUTION INTRAVENOUS EVERY 8 HOURS
Status: DISCONTINUED | OUTPATIENT
Start: 2017-06-07 | End: 2017-06-07 | Stop reason: HOSPADM

## 2017-06-07 RX ORDER — DIPHENHYDRAMINE HCL 25 MG
50 CAPSULE ORAL ONCE
Status: CANCELLED
Start: 2017-06-07 | End: 2017-06-07

## 2017-06-07 RX ORDER — HEPARIN SODIUM (PORCINE) LOCK FLUSH IV SOLN 100 UNIT/ML 100 UNIT/ML
5 SOLUTION INTRAVENOUS ONCE
Status: COMPLETED | OUTPATIENT
Start: 2017-06-07 | End: 2017-06-07

## 2017-06-07 RX ORDER — MEPERIDINE HYDROCHLORIDE 25 MG/ML
25 INJECTION INTRAMUSCULAR; INTRAVENOUS; SUBCUTANEOUS
Status: CANCELLED
Start: 2017-06-07

## 2017-06-07 RX ORDER — ETOPOSIDE 50 MG/1
100 CAPSULE ORAL DAILY
Qty: 8 CAPSULE | Refills: 0 | Status: SHIPPED | OUTPATIENT
Start: 2017-06-08 | End: 2017-07-20

## 2017-06-07 RX ORDER — ACETAMINOPHEN 325 MG/1
650 TABLET ORAL ONCE
Status: COMPLETED | OUTPATIENT
Start: 2017-06-07 | End: 2017-06-07

## 2017-06-07 RX ORDER — PALONOSETRON 0.05 MG/ML
0.25 INJECTION, SOLUTION INTRAVENOUS ONCE
Status: COMPLETED | OUTPATIENT
Start: 2017-06-07 | End: 2017-06-07

## 2017-06-07 RX ORDER — ALBUTEROL SULFATE 0.83 MG/ML
2.5 SOLUTION RESPIRATORY (INHALATION)
Status: CANCELLED | OUTPATIENT
Start: 2017-06-07

## 2017-06-07 RX ORDER — METHYLPREDNISOLONE SODIUM SUCCINATE 125 MG/2ML
125 INJECTION, POWDER, LYOPHILIZED, FOR SOLUTION INTRAMUSCULAR; INTRAVENOUS
Status: CANCELLED
Start: 2017-06-07

## 2017-06-07 RX ORDER — PALONOSETRON 0.05 MG/ML
0.25 INJECTION, SOLUTION INTRAVENOUS ONCE
Status: CANCELLED
Start: 2017-06-07

## 2017-06-07 RX ORDER — EPINEPHRINE 0.3 MG/.3ML
0.3 INJECTION SUBCUTANEOUS EVERY 5 MIN PRN
Status: CANCELLED | OUTPATIENT
Start: 2017-06-07

## 2017-06-07 RX ORDER — ALBUTEROL SULFATE 90 UG/1
1-2 AEROSOL, METERED RESPIRATORY (INHALATION)
Status: CANCELLED
Start: 2017-06-07

## 2017-06-07 RX ORDER — HEPARIN SODIUM (PORCINE) LOCK FLUSH IV SOLN 100 UNIT/ML 100 UNIT/ML
5 SOLUTION INTRAVENOUS EVERY 8 HOURS
Status: CANCELLED
Start: 2017-06-07

## 2017-06-07 RX ORDER — LORAZEPAM 2 MG/ML
0.5 INJECTION INTRAMUSCULAR EVERY 4 HOURS PRN
Status: CANCELLED
Start: 2017-06-07

## 2017-06-07 RX ORDER — MEPERIDINE HYDROCHLORIDE 25 MG/ML
25 INJECTION INTRAMUSCULAR; INTRAVENOUS; SUBCUTANEOUS EVERY 30 MIN PRN
Status: CANCELLED | OUTPATIENT
Start: 2017-06-07

## 2017-06-07 RX ORDER — PREDNISONE 50 MG/1
50 TABLET ORAL 2 TIMES DAILY
Qty: 10 TABLET | Refills: 0 | Status: SHIPPED | OUTPATIENT
Start: 2017-06-07 | End: 2017-06-12

## 2017-06-07 RX ADMIN — ETOPOSIDE 110 MG: 20 INJECTION, SOLUTION, CONCENTRATE INTRAVENOUS at 16:23

## 2017-06-07 RX ADMIN — SODIUM CHLORIDE 150 MG: 9 INJECTION, SOLUTION INTRAVENOUS at 12:04

## 2017-06-07 RX ADMIN — VINCRISTINE SULFATE 2 MG: 1 INJECTION, SOLUTION INTRAVENOUS at 15:30

## 2017-06-07 RX ADMIN — CYCLOPHOSPHAMIDE 1650 MG: 2 INJECTION, POWDER, FOR SOLUTION INTRAVENOUS; ORAL at 15:35

## 2017-06-07 RX ADMIN — PALONOSETRON HYDROCHLORIDE 0.25 MG: 0.25 INJECTION INTRAVENOUS at 12:02

## 2017-06-07 RX ADMIN — SODIUM CHLORIDE 250 ML: 9 INJECTION, SOLUTION INTRAVENOUS at 12:02

## 2017-06-07 RX ADMIN — DIPHENHYDRAMINE HYDROCHLORIDE 50 MG: 50 INJECTION INTRAMUSCULAR; INTRAVENOUS at 12:29

## 2017-06-07 RX ADMIN — ACETAMINOPHEN 650 MG: 325 TABLET ORAL at 12:05

## 2017-06-07 RX ADMIN — SODIUM CHLORIDE, PRESERVATIVE FREE 5 ML: 5 INJECTION INTRAVENOUS at 17:29

## 2017-06-07 RX ADMIN — SODIUM CHLORIDE, PRESERVATIVE FREE 5 ML: 5 INJECTION INTRAVENOUS at 10:51

## 2017-06-07 RX ADMIN — RITUXIMAB 800 MG: 10 INJECTION, SOLUTION INTRAVENOUS at 12:47

## 2017-06-07 NOTE — NURSING NOTE
Chief Complaint   Patient presents with     Port Draw     labs drawn     Port accessed using aseptic technique. Blood aspirated without difficulty. Labs drawn. Port flushed with 20cc 0.9% NS. Locked with 100U/ ml heparin (5 ml). Pt tolerated well. Port left accessed for infusion today.     ANGELA Díaz RN, BSN

## 2017-06-07 NOTE — NURSING NOTE
"Oncology Rooming Note    June 7, 2017 11:05 AM   Cricket Chen is a 36 year old male who presents for:    Chief Complaint   Patient presents with     Port Draw     labs drawn     Oncology Clinic Visit     ptld      Initial Vitals: /67  Pulse 81  Temp 98.3  F (36.8  C) (Oral)  Resp 16  Wt 92.8 kg (204 lb 8 oz)  SpO2 98%  BMI 29.34 kg/m2 Estimated body mass index is 29.34 kg/(m^2) as calculated from the following:    Height as of 5/17/17: 1.778 m (5' 10\").    Weight as of this encounter: 92.8 kg (204 lb 8 oz). Body surface area is 2.14 meters squared.  Data Unavailable Comment: Data Unavailable   No LMP for male patient.  Allergies reviewed: Yes  Medications reviewed: Yes    Medications: medication refill needed today  Pharmacy name entered into [employer]:    [facility] [place], [place] - [address] [place] [address] [phone]  [facility] [place], [place] - [address] [place] [address]    Clinical concerns: no Krystina was NOT notified.    5 minutes for nursing intake (face to face time)     Mirtha Alcantara CMA              "

## 2017-06-07 NOTE — MR AVS SNAPSHOT
After Visit Summary   6/7/2017    Cricket Chen    MRN: [id number]           Patient Information     Date Of Birth          1980        Visit Information        Provider Department      6/7/2017 12:00 PM  15 ATC;  ONCOLOGY INFUSION [facility]        Today's Diagnoses     Post-transplant lymphoproliferative disorder (H)    -  1      Care Instructions    Contact numbers:  Triage Main Line: [phone]  After hours: [phone]    Call with chills and/or temperature greater than or equal to 100.5 and questions or concerns.    If after hours, weekends, or holidays, call main hospital  at  [phone] and ask for Oncology doctor on call.           June 2017 Sunday Monday Tuesday Wednesday Thursday Friday Saturday                       1     2     3       4     5     6     7     [facility] LAB DRAW   10:30 AM   (15 min.)    [facility] LAB DRAW   [facility] Lab Draw     [facility] RETURN   10:55 AM   (50 min.)   Krystina Durham PA-C   [facility] ONC INFUSION 360   12:00 PM   (360 min.)    ONCOLOGY INFUSION   [facility] 8     9     10     [facility] INJECTION   11:45 AM   (15 min.)   Nurse,  Oncology Injection   [facility]   11     12     13     14     15     16     17       18     19     20     21     22     23     24       25     26     LAB WITH HB CLINIC   11:00 AM   (15 min.)    LAB   [facility] Lab     P LIVER TX RETURN   11:35 AM   (20 min.)   Laureen Galvan MD   [facility] Hepatology 27     28     29     30                     July 2017 Sunday Monday Tuesday Wednesday Thursday Friday Saturday                                 1       2     3     4     5     6     7     8       9     10     11     12     13     14     15       16     17     18     19     PE [facility]/TH ONCOLOGY   10:30 AM   (45 min.)   UUPET1   [facility] [place] PET CT 20     [facility] LAB DRAW    3:00 PM   (15 min.)     [facility] LAB DRAW   [facility] Lab Draw     [facility] ONC RETURN    3:30 PM   (30 min.)   Girish Narayanan MD   [facility] Blood and Marrow Transplant 21     22       23     24     25     26     27     28     29       30     31                                           Lab Results:  Recent Results (from the past 12 hour(s))   CBC with platelets differential    Collection Time: 06/07/17 10:52 AM   Result Value Ref Range    WBC 3.8 (L) 4.0 - 11.0 10e9/L    RBC Count 4.11 (L) 4.4 - 5.9 10e12/L    Hemoglobin 12.6 (L) 13.3 - 17.7 g/dL    Hematocrit 39.1 (L) 40.0 - 53.0 %    MCV 95 78 - 100 fl    MCH 30.7 26.5 - 33.0 pg    MCHC 32.2 31.5 - 36.5 g/dL    RDW 14.2 10.0 - 15.0 %    Platelet Count 87 (L) 150 - 450 10e9/L    Diff Method Automated Method     % Neutrophils 80.1 %    % Lymphocytes 6.5 %    % Monocytes 11.3 %    % Eosinophils 1.0 %    % Basophils 0.8 %    % Immature Granulocytes 0.3 %    Nucleated RBCs 0 0 /100    Absolute Neutrophil 3.1 1.6 - 8.3 10e9/L    Absolute Lymphocytes 0.3 (L) 0.8 - 5.3 10e9/L    Absolute Monocytes 0.4 0.0 - 1.3 10e9/L    Absolute Eosinophils 0.0 0.0 - 0.7 10e9/L    Absolute Basophils 0.0 0.0 - 0.2 10e9/L    Abs Immature Granulocytes 0.0 0 - 0.4 10e9/L    Absolute Nucleated RBC 0.0    Comprehensive metabolic panel    Collection Time: 06/07/17 10:52 AM   Result Value Ref Range    Sodium 140 133 - 144 mmol/L    Potassium 3.8 3.4 - 5.3 mmol/L    Chloride 108 94 - 109 mmol/L    Carbon Dioxide 24 20 - 32 mmol/L    Anion Gap 8 3 - 14 mmol/L    Glucose 87 70 - 99 mg/dL    Urea Nitrogen 21 7 - 30 mg/dL    Creatinine 1.15 0.66 - 1.25 mg/dL    GFR Estimate 72 >60 mL/min/1.7m2    GFR Estimate If Black 87 >60 mL/min/1.7m2    Calcium 8.9 8.5 - 10.1 mg/dL    Bilirubin Total 0.8 0.2 - 1.3 mg/dL    Albumin 3.9 3.4 - 5.0 g/dL    Protein Total 6.8 6.8 - 8.8 g/dL    Alkaline Phosphatase 71 40 - 150 U/L    ALT 21 0 - 70 U/L    AST 14 0 - 45 U/L               Follow-ups after your visit        Your next 10  appointments already scheduled     Wally 10, 2017 12:00 PM CDT   (Arrive by 11:45 AM)   INJECTION with  Oncology Injection Nurse   [facility] ([facility])    [address]  2nd Floor  [place] [facility] [address]   [phone]            Jun 26, 2017 11:00 AM CDT   Lab with  LAB   [facility] Lab ([facility])    [address]  1st Floor  [place] [address]   [phone]            Jun 26, 2017 11:50 AM CDT   (Arrive by 11:35 AM)   Return Liver Transplant with Laureen Galvan MD   [facility] Hepatology ([facility])    [address]  3rd Floor  [place] [address]   [phone]            Jul 19, 2017 10:30 AM CDT   PE NPET ONCOLOGY (EYES TO THIGHS) with UUPET1   [facility] [place] PET CT ([facility])    [address] [address] [phone]           Tell your doctor:   If there is any chance you may be pregnant or if you are breastfeeding.   If you have problems lying in small spaces (claustrophobia). If you do, your doctor may give you medicine to help you relax. If you have diabetes:   Have your exam early in the morning. Your blood glucose will go up as the day goes by.   Your glucose level must be 180 or less at the start of the exam. Please take any medicines you need to ensure this blood glucose level. 24 hours before your scan: Don t do any heavy exercise. (No jogging, aerobics or other workouts.) Exercise will make your pictures less accurate. 6 hours before your scan:   Stop all food and liquids (except water).   Do not chew gum or suck on mints.   If you need to take medicine with food, you may take it with a few crackers.  Please call your Imaging Department at your exam site with any questions.            Jul 20, 2017  3:00 PM CDT   [facility] Lab Draw with  [facility] LAB DRAW   [facility] Lab Draw (  [facility])    [address]  2nd [facility] [address] [phone]            Jul 20, 2017  3:30 PM CDT   RETURN ONC with Girish Narayanan MD   [facility] Blood and Marrow Transplant ([facility] [place])    [address] [address] [phone]              Who to contact     If you have questions or need follow up information about today's clinic visit or your schedule please contact [facility] directly at [phone].  Normal or non-critical lab and imaging results will be communicated to you by Watticshart, letter or phone within 4 business days after the clinic has received the results. If you do not hear from us within 7 days, please contact the clinic through Pokelabot or phone. If you have a critical or abnormal lab result, we will notify you by phone as soon as possible.  Submit refill requests through ezTaxi or call your pharmacy and they will forward the refill request to us. Please allow 3 business days for your refill to be completed.          Additional Information About Your Visit        Watticshart Information     ezTaxi gives you secure access to your electronic health record. If you see a primary care provider, you can also send messages to your care team and make appointments. If you have questions, please call your primary care clinic.  If you do not have a primary care provider, please call [phone] and they will assist you.        Care EveryWhere ID     This is your Care EveryWhere ID. This could be used by other organizations to access your [place] medical records  [id number]         Blood Pressure from Last 3 Encounters:   06/07/17 126/67   05/20/17 126/69   05/17/17 136/74    Weight from Last 3 Encounters:   06/07/17 92.8 kg (204 lb 8 oz)   05/20/17 94.7 kg (208 lb 12.8 oz)   05/17/17 94.3 kg (208 lb)              We Performed the Following     CBC with platelets differential      Comprehensive metabolic panel          Today's Medication Changes          These changes are accurate as of: 6/7/17 12:55 PM.  If you have any questions, ask your nurse or doctor.               These medicines have changed or have updated prescriptions.        Dose/Directions    * etoposide 50 MG capsule CHEMO   This may have changed:  Another medication with the same name was added. Make sure you understand how and when to take each.   Used for:  Post-transplant lymphoproliferative disorder (H)        Dose:  100 mg/m2/day   Take 4 capsules (200 mg) by mouth daily for 2 days Day 2 and 3.   Quantity:  8 capsule   Refills:  0       * etoposide 50 MG capsule CHEMO   This may have changed:  You were already taking a medication with the same name, and this prescription was added. Make sure you understand how and when to take each.   Used for:  Post-transplant lymphoproliferative disorder (H)        Dose:  100 mg/m2/day   Start taking on:  6/8/2017   Take 4 capsules (200 mg) by mouth daily for 2 days Day 2 and 3.   Quantity:  8 capsule   Refills:  0       * predniSONE 10 MG tablet   Commonly known as:  DELTASONE   This may have changed:  Another medication with the same name was added. Make sure you understand how and when to take each.   Used for:  Liver replaced by transplant (H), S/P kidney transplant   Changed by:  Pam Egan RN        Dose:  10 mg   Take 1 tablet (10 mg) by mouth daily   Quantity:  21 tablet   Refills:  3       * predniSONE 50 MG tablet   Commonly known as:  DELTASONE   This may have changed:  You were already taking a medication with the same name, and this prescription was added. Make sure you understand how and when to take each.   Used for:  Post-transplant lymphoproliferative disorder (H)        Dose:  50 mg   Take 1 tablet (50 mg) by mouth 2 times daily for 5 days Days 1 through 5.   Quantity:  10 tablet   Refills:  0       * Notice:  This list has 4 medication(s) that are the same  as other medications prescribed for you. Read the directions carefully, and ask your doctor or other care provider to review them with you.         Where to get your medicines      These medications were sent to [facility] - [place], [place] - [address]  [address], [facility] [address]    Hours:  TRANSPLANT PHONE NUMBER [phone] Phone:  [phone]     etoposide 50 MG capsule CHEMO    predniSONE 50 MG tablet                Primary Care Provider Office Phone # Fax #    Eugenia Perez [phone]       [facility] [address]  [facility] [place] [facility] [address]        Thank you!     Thank you for choosing [facility]  for your care. Our goal is always to provide you with excellent care. Hearing back from our patients is one way we can continue to improve our services. Please take a few minutes to complete the written survey that you may receive in the mail after your visit with us. Thank you!             Your Updated Medication List - Protect others around you: Learn how to safely use, store and throw away your medicines at www.disposemymeds.org.          This list is accurate as of: 6/7/17 12:55 PM.  Always use your most recent med list.                   Brand Name Dispense Instructions for use    amLODIPine 10 MG tablet    NORVASC    90 tablet    Take 1 tablet (10 mg) by mouth daily       cyclobenzaprine 5 MG tablet    FLEXERIL    30 tablet    Take 1-2 tablets (5-10 mg) by mouth 3 times daily as needed for muscle spasms       * etoposide 50 MG capsule CHEMO     8 capsule    Take 4 capsules (200 mg) by mouth daily for 2 days Day 2 and 3.       * etoposide 50 MG capsule CHEMO   Start taking on:  6/8/2017     8 capsule    Take 4 capsules (200 mg) by mouth daily for 2 days Day 2 and 3.       lisinopril 5 MG tablet    PRINIVIL/ZESTRIL    90 tablet    Take 1 tablet (5 mg) by mouth daily       LORazepam 0.5 MG  tablet    ATIVAN    30 tablet    Take 1 tablet (0.5 mg) by mouth every 4 hours as needed (Anxiety, Nausea/Vomiting or Sleep)       magnesium oxide 400 (241.3 MG) MG tablet    MAG-OX    120 tablet    Take 2 tablets (800 mg) by mouth 2 times daily       * predniSONE 10 MG tablet    DELTASONE    21 tablet    Take 1 tablet (10 mg) by mouth daily       * predniSONE 50 MG tablet    DELTASONE    10 tablet    Take 1 tablet (50 mg) by mouth 2 times daily for 5 days Days 1 through 5.       prochlorperazine 10 MG tablet    COMPAZINE    30 tablet    Take 1 tablet (10 mg) by mouth every 6 hours as needed (Nausea/Vomiting)       sulfamethoxazole-trimethoprim 400-80 MG per tablet    BACTRIM/SEPTRA    30 tablet    Take 1 tablet by mouth daily       traMADol 50 MG tablet    ULTRAM    45 tablet    Take 1 tablet (50 mg) by mouth every 8 hours as needed for moderate pain       traZODone 50 MG tablet    DESYREL    30 tablet    Take 0.5-2 tablets ( mg) by mouth At Bedtime       vitamin D 2000 UNITS tablet     100 tablet    Take 2,000 Units by mouth daily       * Notice:  This list has 4 medication(s) that are the same as other medications prescribed for you. Read the directions carefully, and ask your doctor or other care provider to review them with you.

## 2017-06-07 NOTE — PROGRESS NOTES
Infusion Nursing Note:  Cricket Chen presents for D1C4 Rituxan, Vincristine, Cytoxan, Etoposide  Met with BONNIE Alberto before infusion.    Note: N/A.    Treatment Conditions:  Lab Results   Component Value Date    HGB 12.6 06/07/2017     Lab Results   Component Value Date    WBC 3.8 06/07/2017      Lab Results   Component Value Date    ANEU 3.1 06/07/2017     Lab Results   Component Value Date    PLT 87 06/07/2017      Lab Results   Component Value Date     06/07/2017                   Lab Results   Component Value Date    POTASSIUM 3.8 06/07/2017           Lab Results   Component Value Date    MAG 2.4 05/17/2017            Lab Results   Component Value Date    CR 1.15 06/07/2017                   Lab Results   Component Value Date    COLBY 8.9 06/07/2017                Lab Results   Component Value Date    BILITOTAL 0.8 06/07/2017           Lab Results   Component Value Date    ALBUMIN 3.9 06/07/2017                    Lab Results   Component Value Date    ALT 21 06/07/2017           Lab Results   Component Value Date    AST 14 06/07/2017     Results reviewed, labs MET treatment parameters, ok to proceed with treatment.    Intravenous Access:  Implanted Port.    Post Infusion Assessment:  Patient tolerated infusion without incident.  Blood return noted pre and post infusion.  No evidence of extravasations.  Access discontinued per protocol.    Discharge Plan:   Prescription refills given for etoposide and prednisone.  Discharge instructions reviewed with: Patient.  Patient and/or family verbalized understanding of discharge instructions and all questions answered.  Copy of AVS reviewed with patient and/or family.  Patient will return Saturday for neulasta and 7/20 for next MD appointment.  Patient discharged in stable condition accompanied by: self.    Viki Tariq RN

## 2017-06-07 NOTE — LETTER
6/7/2017      RE: Cricket Chen  [address]  [facility] [address]       Oncology/Hematology Visit Note  Jun 7, 2017    Reason for Visit: follow up of stage III PTLD EBV-positive monomorphic DLBCL    History of Present Illness: Cricket Chen is a 36 year old male with stage III PTLD EBV-positive monomorphic DLBCL. He has a prior history of end-stage liver disease and kidney disease, status post combined liver/kidney transplant back in 05/2016. He was maintained on immunosuppression with tacrolimus and CellCept, and was feeling well up until 12/2016 when he started to experience excessive fatigue and had noticed the emergence of a lump in his right neck. He was subsequently evaluated in January 2017 for persistent right neck lymphadenopathy and underwent a fine needle aspiration, which was largely nondiagnostic. He subsequently followed with excisional lymph node biopsy on 02/01/17, which was diagnostic for monomorphic PTLD/EBV-positive/diffuse large B-cell lymphoma of activated B-cell type, by Tejinder criteria (negative for CD10 and positive for MUM-1). The patient reported persistent fatigue, but no recent weight loss, fevers or drenching night sweats. He had noticed that the lump in the neck subsided after an excisional lymph node biopsy, but still persisted to some extent.       The patient also underwent reduction of immunosuppression with decrease in the dose of his tacrolimus from 4 mg in the morning and 3 mg in the evening to 3 mg b.i.d. His CellCept was also decreased from 750 mg twice a day to 250 mg twice a day.       He underwent a diagnostic PET/CT scan on 2/4/17 and bone marrow biopsy. His PET scan overall demonstrated relatively low burden of disease with no PET-active area in the neck, except from the thyroid gland. The patient was also found to have PET-avid right axillary lymphadenopathy measuring 1.3 cm.       There was no evidence of FDG-avid areas throughout the rest of his body. He  also underwent a diagnostic bone marrow biopsy, which was reported with no morphologic or immunophenotypic evidence of PTLD. FNA of the thyroid lesion on 3/16/17 showed papillary thyroid carcinoma. He started with 4 weeks doses of Rituxan on 2/10/17-3/3/17. CT neck/CAP on 3/28/17 showed a partial response. Therefore, he started on treatment with R-CEOP on 4/5/17. He was not given R-CHOP due to low ejection fraction of 30-35%. He had a port placed on 4/3/17. Cycle 2 was given on 4/26/17 and cycle 3 was on 5/17/17. Please see previous notes for further details on the patient's history. He comes in today for routine follow up prior to cycle 4 R-CEOP.    Interval History:  Patient reports that he has had a cold for the past 1.5 weeks with a mostly dry cough and some nasal congestion. He denies any fevers, chest pain, or dyspnea. He has been up some at night with coughing. He did not have any muscle spasms following this last cycle of chemotherapy. He occasionally uses trazodone or Ativan to help him sleep. He denies other concerns.     Review of Systems:  Patient denies any of the following except if noted above: fevers, chills, vision or hearing changes, chest pain, dyspnea, abdominal pain, nausea, vomiting, diarrhea, constipation, urinary concerns, headaches, numbness, tingling, or issues with mood.  Answers for HPI/ROS submitted by the patient on 6/5/2017   General Symptoms: No  Skin Symptoms: No  HENT Symptoms: No  EYE SYMPTOMS: No  HEART SYMPTOMS: No  LUNG SYMPTOMS: No  INTESTINAL SYMPTOMS: No  URINARY SYMPTOMS: No  REPRODUCTIVE SYMPTOMS: No  SKELETAL SYMPTOMS: No  BLOOD SYMPTOMS: No  NERVOUS SYSTEM SYMPTOMS: No  MENTAL HEALTH SYMPTOMS: No      Current Outpatient Prescriptions   Medication Sig Dispense Refill     sulfamethoxazole-trimethoprim (BACTRIM/SEPTRA) 400-80 MG per tablet Take 1 tablet by mouth daily 30 tablet 11     traZODone (DESYREL) 50 MG tablet Take 0.5-2 tablets ( mg) by mouth At Bedtime 30  tablet 1     cyclobenzaprine (FLEXERIL) 5 MG tablet Take 1-2 tablets (5-10 mg) by mouth 3 times daily as needed for muscle spasms 30 tablet 3     predniSONE (DELTASONE) 10 MG tablet Take 1 tablet (10 mg) by mouth daily 21 tablet 3     LORazepam (ATIVAN) 0.5 MG tablet Take 1 tablet (0.5 mg) by mouth every 4 hours as needed (Anxiety, Nausea/Vomiting or Sleep) 30 tablet 5     prochlorperazine (COMPAZINE) 10 MG tablet Take 1 tablet (10 mg) by mouth every 6 hours as needed (Nausea/Vomiting) 30 tablet 5     traMADol (ULTRAM) 50 MG tablet Take 1 tablet (50 mg) by mouth every 8 hours as needed for moderate pain 45 tablet 0     Cholecalciferol (VITAMIN D) 2000 UNITS tablet Take 2,000 Units by mouth daily 100 tablet 3     magnesium oxide (MAG-OX) 400 (241.3 MG) MG tablet Take 2 tablets (800 mg) by mouth 2 times daily 120 tablet 3     amLODIPine (NORVASC) 10 MG tablet Take 1 tablet (10 mg) by mouth daily 90 tablet 3     lisinopril (PRINIVIL,ZESTRIL) 5 MG tablet Take 1 tablet (5 mg) by mouth daily 90 tablet 3     [START ON 6/8/2017] etoposide 50 MG capsule CHEMO Take 4 capsules (200 mg) by mouth daily for 2 days Day 2 and 3. 8 capsule 0     predniSONE (DELTASONE) 50 MG tablet Take 1 tablet (50 mg) by mouth 2 times daily for 5 days Days 1 through 5. 10 tablet 0     Physical Examination:  General: The patient is a pleasant male in no acute distress.  /67  Pulse 81  Temp 98.3  F (36.8  C) (Oral)  Resp 16  Wt 92.8 kg (204 lb 8 oz)  SpO2 98%  BMI 29.34 kg/m2  Wt Readings from Last 10 Encounters:   06/07/17 92.8 kg (204 lb 8 oz)   05/20/17 94.7 kg (208 lb 12.8 oz)   05/17/17 94.3 kg (208 lb)   05/17/17 94.7 kg (208 lb 12.8 oz)   04/26/17 94 kg (207 lb 4.8 oz)   04/26/17 94 kg (207 lb 4.8 oz)   04/18/17 93.3 kg (205 lb 11.2 oz)   04/05/17 93.6 kg (206 lb 6.4 oz)   04/03/17 90.7 kg (200 lb)   03/30/17 93.6 kg (206 lb 5.6 oz)   HEENT: EOMI, PERRL. Sclerae are anicteric. Oral mucosa is pink and moist with no lesions or  thrush.   Lymph: Neck is supple with no lymphadenopathy in the cervical or supraclavicular areas.   Heart: Regular rate and rhythm.   Lungs: Clear to auscultation bilaterally.   Abdomen: Bowel sounds present, soft, nontender with no palpable hepatosplenomegaly or masses.   Extremities: No lower extremity edema noted bilaterally.   Neuro: Cranial nerves II through XII are grossly intact.  Skin: No rashes, petechiae, or bruising noted on exposed skin.    Laboratory Data:   6/7/2017 10:52   Sodium 140   Potassium 3.8   Chloride 108   Carbon Dioxide 24   Urea Nitrogen 21   Creatinine 1.15   GFR Estimate 72   GFR Estimate If Black 87   Calcium 8.9   Anion Gap 8   Albumin 3.9   Protein Total 6.8   Bilirubin Total 0.8   Alkaline Phosphatase 71   ALT 21   AST 14   Glucose 87   WBC 3.8 (L)   Hemoglobin 12.6 (L)   Hematocrit 39.1 (L)   Platelet Count 87 (L)   RBC Count 4.11 (L)   MCV 95   MCH 30.7   MCHC 32.2   RDW 14.2   Diff Method Automated Method   % Neutrophils 80.1   % Lymphocytes 6.5   % Monocytes 11.3   % Eosinophils 1.0   % Basophils 0.8   % Immature Granulocytes 0.3   Nucleated RBCs 0   Absolute Neutrophil 3.1   Absolute Lymphocytes 0.3 (L)   Absolute Monocytes 0.4   Absolute Eosinophils 0.0   Absolute Basophils 0.0   Abs Immature Granulocytes 0.0   Absolute Nucleated RBC 0.0     Assessment and Plan:  1. Stage III PTLD EBV-positive monomorphic DLBCL. Patient received 4 weekly doses of Rituxan with a partial response. He then started on R-CEOP with Neulasta support on 4/5/17. Etoposide is given IV on day 1 and oral on days 2 and 3. He tolerated this fairly well with some back and hip pain/spasm associated with Neulasta with cycles 1 and 2, but not with 3. He will continue with cycle 4 today. He will have a PET/CT on 7/19 and follow up with Dr. Narayanan on 7/20.    2. Papillary thyroid carcinoma. Will defer surgical resection until after completion of 4 cycles of R-CEOP. Will schedule him to see Dr. Soto the end of  July following his PET/CT.     3. History of kidney and liver transplant. No current concerns for rejection. Remains on prednisone 10 mg daily. He is off Prograf and Cellcept due to chemotherapy. Recommend he discuss timing of resuming either of these medications with his transplant team. I have also messaged Layla Sidhu and Cole.    4. EBV viremia. Resolved after 4 weekly doses of Rituxan. Last level on 3/8/17 undetectable.     5. Hypertension. BP is under good control. He remains on amlodipine 10 mg qhs and lisinopril 5 mg daily.     6. Back/hip muscle spasm. Recommend using Claritin and Flexeril if recurs with Neulasta.     7. Insomnia. Okay to continue to use trazodone or Ativan.     Krystina Durham PA-C  [facility]  [address] [place], [place] [id number] [phone]

## 2017-06-07 NOTE — PATIENT INSTRUCTIONS
Contact numbers:  Triage Main Line: [phone]  After hours: [phone]    Call with chills and/or temperature greater than or equal to 100.5 and questions or concerns.    If after hours, weekends, or holidays, call main hospital  at  [phone] and ask for Oncology doctor on call.           June 2017 Sunday Monday Tuesday Wednesday Thursday Friday Saturday                       1     2     3       4     5     6     7     [facility] DRAW   10:30 AM   (15 min.)    [facility] LAB DRAW   [facility] Lab Draw     [facility] RETURN   10:55 AM   (50 min.)   Krystina Durham PA-C   [facility]   12:00 PM   (360 min.)    ONCOLOGY INFUSION   [facility] 8     9     10     [facility] INJECTION   11:45 AM   (15 min.)   Nurse,  Oncology Injection   [facility]   11     12     13     14     15     16     17       18     19     20     21     22     23     24       25     26     LAB WITH HB CLINIC   11:00 AM   (15 min.)    LAB   [facility] Lab     P LIVER TX RETURN   11:35 AM   (20 min.)   Laureen Galvan MD   [facility] Hepatology 27     28     29     30                     July 2017 Sunday Monday Tuesday Wednesday Thursday Friday Saturday                                 1       2     3     4     5     6     7     8       9     10     11     12     13     14     15       16     17     18     19     PE [facility]/ ONCOLOGY   10:30 AM   (45 min.)   UUPET1   [facility] PET CT 20     [facility] DRAW    3:00 PM   (15 min.)   [facility] DRAW   [facility] Lab Draw     [facility] ONC RETURN    3:30 PM   (30 min.)   Girish Narayanan MD   [facility] Blood and Marrow Transplant 21     22       23     24     25     26     27     28     29       30     31                                           Lab Results:  Recent Results (from the past 12 hour(s))   CBC with platelets differential    Collection Time: 06/07/17 10:52  AM   Result Value Ref Range    WBC 3.8 (L) 4.0 - 11.0 10e9/L    RBC Count 4.11 (L) 4.4 - 5.9 10e12/L    Hemoglobin 12.6 (L) 13.3 - 17.7 g/dL    Hematocrit 39.1 (L) 40.0 - 53.0 %    MCV 95 78 - 100 fl    MCH 30.7 26.5 - 33.0 pg    MCHC 32.2 31.5 - 36.5 g/dL    RDW 14.2 10.0 - 15.0 %    Platelet Count 87 (L) 150 - 450 10e9/L    Diff Method Automated Method     % Neutrophils 80.1 %    % Lymphocytes 6.5 %    % Monocytes 11.3 %    % Eosinophils 1.0 %    % Basophils 0.8 %    % Immature Granulocytes 0.3 %    Nucleated RBCs 0 0 /100    Absolute Neutrophil 3.1 1.6 - 8.3 10e9/L    Absolute Lymphocytes 0.3 (L) 0.8 - 5.3 10e9/L    Absolute Monocytes 0.4 0.0 - 1.3 10e9/L    Absolute Eosinophils 0.0 0.0 - 0.7 10e9/L    Absolute Basophils 0.0 0.0 - 0.2 10e9/L    Abs Immature Granulocytes 0.0 0 - 0.4 10e9/L    Absolute Nucleated RBC 0.0    Comprehensive metabolic panel    Collection Time: 06/07/17 10:52 AM   Result Value Ref Range    Sodium 140 133 - 144 mmol/L    Potassium 3.8 3.4 - 5.3 mmol/L    Chloride 108 94 - 109 mmol/L    Carbon Dioxide 24 20 - 32 mmol/L    Anion Gap 8 3 - 14 mmol/L    Glucose 87 70 - 99 mg/dL    Urea Nitrogen 21 7 - 30 mg/dL    Creatinine 1.15 0.66 - 1.25 mg/dL    GFR Estimate 72 >60 mL/min/1.7m2    GFR Estimate If Black 87 >60 mL/min/1.7m2    Calcium 8.9 8.5 - 10.1 mg/dL    Bilirubin Total 0.8 0.2 - 1.3 mg/dL    Albumin 3.9 3.4 - 5.0 g/dL    Protein Total 6.8 6.8 - 8.8 g/dL    Alkaline Phosphatase 71 40 - 150 U/L    ALT 21 0 - 70 U/L    AST 14 0 - 45 U/L

## 2017-06-07 NOTE — MR AVS SNAPSHOT
After Visit Summary   6/7/2017    Cricket Chen    MRN: [id number]           Patient Information     Date Of Birth          1980        Visit Information        Provider Department      6/7/2017 11:10 AM Krystina Durham PA-C [facility]        Today's Diagnoses     Post-transplant lymphoproliferative disorder (H)    -  1    Liver replaced by transplant (H)        Papillary thyroid carcinoma (H)           Follow-ups after your visit        Additional Services     OTOLARYNGOLOGY REFERRAL       Please see for evaluation of surgical resection of papillary thyroid carcinoma.    Your provider has referred you to: PREFERRED PROVIDERS: Dr. Yang    Please be aware that coverage of these services is subject to the terms and limitations of your health insurance plan.  Call member services at your health plan with any benefit or coverage questions.      Please bring the following with you to your appointment:    (1) Any X-Rays, CTs or MRIs which have been performed.  Contact the facility where they were done to arrange for  prior to your scheduled appointment.   (2) List of current medications  (3) This referral request   (4) Any documents/labs given to you for this referral                  Your next 10 appointments already scheduled     Jun 26, 2017 11:00 AM CDT   Lab with  LAB   [facility] Lab ([facility] [place])    [address]  1st Floor  [facility] [address] [phone]            Jun 26, 2017 11:50 AM CDT   (Arrive by 11:35 AM)   Return Liver Transplant with Laureen Galvan MD   [facility] Hepatology ([facility])    [address]  3rd Floor  [facility] [id number] [phone]            Jul 19, 2017 10:30 AM CDT   PE NPET ONCOLOGY (EYES TO THIGHS) with UUPET[facility]   [facility] [place]    [address]  [address]   [phone]           Tell your doctor:   If there is any chance you may be pregnant or if you are breastfeeding.   If you have problems lying in small spaces (claustrophobia). If you do, your doctor may give you medicine to help you relax. If you have diabetes:   Have your exam early in the morning. Your blood glucose will go up as the day goes by.   Your glucose level must be 180 or less at the start of the exam. Please take any medicines you need to ensure this blood glucose level. 24 hours before your scan: Don t do any heavy exercise. (No jogging, aerobics or other workouts.) Exercise will make your pictures less accurate. 6 hours before your scan:   Stop all food and liquids (except water).   Do not chew gum or suck on mints.   If you need to take medicine with food, you may take it with a few crackers.  Please call your Imaging Department at your exam site with any questions.            Jul 20, 2017  3:00 PM CDT   [employer] Lab Draw with  [employer] LAB DRAW   [facility] Lab Draw ([facility])    [address] [place] [address] [phone]            Jul 20, 2017  3:30 PM CDT   RETURN ONC with Girish Narayanan MD   [facility] Blood and Marrow Transplant ([facility])    [address] [place] [address] [phone]              Who to contact     If you have questions or need follow up information about today's clinic visit or your schedule please contact [facility] directly at [phone].  Normal or non-critical lab and imaging results will be communicated to you by MyChart, letter or phone within 4 business days after the clinic has received the results. If you do not hear from us within 7 days, please contact the clinic through MyChart or phone. If you have a critical or abnormal lab result, we will notify you by phone as soon as possible.  Submit refill requests  through [facility] or call your pharmacy and they will forward the refill request to us. Please allow 3 business days for your refill to be completed.          Additional Information About Your Visit        [employer] Information     [facility] gives you secure access to your electronic health record. If you see a primary care provider, you can also send messages to your care team and make appointments. If you have questions, please call your primary care clinic.  If you do not have a primary care provider, please call [phone] and they will assist you.        Care EveryWhere ID     This is your Care EveryWhere ID. This could be used by other organizations to access your [place] medical records  [id number]        Your Vitals Were     Pulse Temperature Respirations Pulse Oximetry BMI (Body Mass Index)       81 98.3  F (36.8  C) (Oral) 16 98% 29.34 kg/m2        Blood Pressure from Last 3 Encounters:   06/10/17 114/72   06/07/17 126/67   05/20/17 126/69    Weight from Last 3 Encounters:   06/07/17 92.8 kg (204 lb 8 oz)   05/20/17 94.7 kg (208 lb 12.8 oz)   05/17/17 94.3 kg (208 lb)              We Performed the Following     OTOLARYNGOLOGY REFERRAL          Today's Medication Changes          These changes are accurate as of: 6/7/17 11:59 PM.  If you have any questions, ask your nurse or doctor.               These medicines have changed or have updated prescriptions.        Dose/Directions    * etoposide 50 MG capsule CHEMO   This may have changed:  Another medication with the same name was added. Make sure you understand how and when to take each.   Used for:  Post-transplant lymphoproliferative disorder (H)        Dose:  100 mg/m2/day   Take 4 capsules (200 mg) by mouth daily for 2 days Day 2 and 3.   Quantity:  8 capsule   Refills:  0       * etoposide 50 MG capsule CHEMO   This may have changed:  You were already taking a medication with the same name, and this prescription was added. Make sure you understand how  and when to take each.   Used for:  Post-transplant lymphoproliferative disorder (H)        Dose:  100 mg/m2/day   Take 4 capsules (200 mg) by mouth daily for 2 days Day 2 and 3.   Quantity:  8 capsule   Refills:  0       * predniSONE 10 MG tablet   Commonly known as:  DELTASONE   This may have changed:  Another medication with the same name was added. Make sure you understand how and when to take each.   Used for:  Liver replaced by transplant (H), S/P kidney transplant   Changed by:  Pam Egan RN        Dose:  10 mg   Take 1 tablet (10 mg) by mouth daily   Quantity:  21 tablet   Refills:  3       * predniSONE 50 MG tablet   Commonly known as:  DELTASONE   This may have changed:  You were already taking a medication with the same name, and this prescription was added. Make sure you understand how and when to take each.   Used for:  Post-transplant lymphoproliferative disorder (H)        Dose:  50 mg   Take 1 tablet (50 mg) by mouth 2 times daily for 5 days Days 1 through 5.   Quantity:  10 tablet   Refills:  0       * Notice:  This list has 4 medication(s) that are the same as other medications prescribed for you. Read the directions carefully, and ask your doctor or other care provider to review them with you.         Where to get your medicines      These medications were sent to [place] [id number] [facility]  [address] [place], [place] [id number]    Hours:  TRANSPLANT PHONE NUMBER [phone] Phone:  [phone]     etoposide 50 MG capsule CHEMO    predniSONE 50 MG tablet                Primary Care Provider Office Phone # Fax #    Eugenia Perez [phone]       [address]  [facility] [address]        Thank you!     Thank you for choosing [facility]  for your care. Our goal is always to provide you with excellent care. Hearing back from our patients is one way we  can continue to improve our services. Please take a few minutes to complete the written survey that you may receive in the mail after your visit with us. Thank you!             Your Updated Medication List - Protect others around you: Learn how to safely use, store and throw away your medicines at www.disposemymeds.org.          This list is accurate as of: 6/7/17 11:59 PM.  Always use your most recent med list.                   Brand Name Dispense Instructions for use    amLODIPine 10 MG tablet    NORVASC    90 tablet    Take 1 tablet (10 mg) by mouth daily       cyclobenzaprine 5 MG tablet    FLEXERIL    30 tablet    Take 1-2 tablets (5-10 mg) by mouth 3 times daily as needed for muscle spasms       * etoposide 50 MG capsule CHEMO     8 capsule    Take 4 capsules (200 mg) by mouth daily for 2 days Day 2 and 3.       * etoposide 50 MG capsule CHEMO     8 capsule    Take 4 capsules (200 mg) by mouth daily for 2 days Day 2 and 3.       lisinopril 5 MG tablet    PRINIVIL/ZESTRIL    90 tablet    Take 1 tablet (5 mg) by mouth daily       LORazepam 0.5 MG tablet    ATIVAN    30 tablet    Take 1 tablet (0.5 mg) by mouth every 4 hours as needed (Anxiety, Nausea/Vomiting or Sleep)       * predniSONE 10 MG tablet    DELTASONE    21 tablet    Take 1 tablet (10 mg) by mouth daily       * predniSONE 50 MG tablet    DELTASONE    10 tablet    Take 1 tablet (50 mg) by mouth 2 times daily for 5 days Days 1 through 5.       prochlorperazine 10 MG tablet    COMPAZINE    30 tablet    Take 1 tablet (10 mg) by mouth every 6 hours as needed (Nausea/Vomiting)       sulfamethoxazole-trimethoprim 400-80 MG per tablet    BACTRIM/SEPTRA    30 tablet    Take 1 tablet by mouth daily       traMADol 50 MG tablet    ULTRAM    45 tablet    Take 1 tablet (50 mg) by mouth every 8 hours as needed for moderate pain       traZODone 50 MG tablet    DESYREL    30 tablet    Take 0.5-2 tablets ( mg) by mouth At Bedtime       vitamin D 2000 UNITS  tablet     100 tablet    Take 2,000 Units by mouth daily       * Notice:  This list has 4 medication(s) that are the same as other medications prescribed for you. Read the directions carefully, and ask your doctor or other care provider to review them with you.

## 2017-06-08 NOTE — PROGRESS NOTES
Oncology/Hematology Visit Note  Jun 7, 2017    Reason for Visit: follow up of stage III PTLD EBV-positive monomorphic DLBCL    History of Present Illness: Cricket Chen is a 36 year old male with stage III PTLD EBV-positive monomorphic DLBCL. He has a prior history of end-stage liver disease and kidney disease, status post combined liver/kidney transplant back in 05/2016. He was maintained on immunosuppression with tacrolimus and CellCept, and was feeling well up until 12/2016 when he started to experience excessive fatigue and had noticed the emergence of a lump in his right neck. He was subsequently evaluated in January 2017 for persistent right neck lymphadenopathy and underwent a fine needle aspiration, which was largely nondiagnostic. He subsequently followed with excisional lymph node biopsy on 02/01/17, which was diagnostic for monomorphic PTLD/EBV-positive/diffuse large B-cell lymphoma of activated B-cell type, by Tejinder criteria (negative for CD10 and positive for MUM-1). The patient reported persistent fatigue, but no recent weight loss, fevers or drenching night sweats. He had noticed that the lump in the neck subsided after an excisional lymph node biopsy, but still persisted to some extent.       The patient also underwent reduction of immunosuppression with decrease in the dose of his tacrolimus from 4 mg in the morning and 3 mg in the evening to 3 mg b.i.d. His CellCept was also decreased from 750 mg twice a day to 250 mg twice a day.       He underwent a diagnostic PET/CT scan on 2/4/17 and bone marrow biopsy. His PET scan overall demonstrated relatively low burden of disease with no PET-active area in the neck, except from the thyroid gland. The patient was also found to have PET-avid right axillary lymphadenopathy measuring 1.3 cm.       There was no evidence of FDG-avid areas throughout the rest of his body. He also underwent a diagnostic bone marrow biopsy, which was reported with no morphologic  or immunophenotypic evidence of PTLD. FNA of the thyroid lesion on 3/16/17 showed papillary thyroid carcinoma. He started with 4 weeks doses of Rituxan on 2/10/17-3/3/17. CT neck/CAP on 3/28/17 showed a partial response. Therefore, he started on treatment with R-CEOP on 4/5/17. He was not given R-CHOP due to low ejection fraction of 30-35%. He had a port placed on 4/3/17. Cycle 2 was given on 4/26/17 and cycle 3 was on 5/17/17. Please see previous notes for further details on the patient's history. He comes in today for routine follow up prior to cycle 4 R-CEOP.    Interval History:  Patient reports that he has had a cold for the past 1.5 weeks with a mostly dry cough and some nasal congestion. He denies any fevers, chest pain, or dyspnea. He has been up some at night with coughing. He did not have any muscle spasms following this last cycle of chemotherapy. He occasionally uses trazodone or Ativan to help him sleep. He denies other concerns.     Review of Systems:  Patient denies any of the following except if noted above: fevers, chills, vision or hearing changes, chest pain, dyspnea, abdominal pain, nausea, vomiting, diarrhea, constipation, urinary concerns, headaches, numbness, tingling, or issues with mood.  Answers for HPI/ROS submitted by the patient on 6/5/2017   General Symptoms: No  Skin Symptoms: No  HENT Symptoms: No  EYE SYMPTOMS: No  HEART SYMPTOMS: No  LUNG SYMPTOMS: No  INTESTINAL SYMPTOMS: No  URINARY SYMPTOMS: No  REPRODUCTIVE SYMPTOMS: No  SKELETAL SYMPTOMS: No  BLOOD SYMPTOMS: No  NERVOUS SYSTEM SYMPTOMS: No  MENTAL HEALTH SYMPTOMS: No      Current Outpatient Prescriptions   Medication Sig Dispense Refill     sulfamethoxazole-trimethoprim (BACTRIM/SEPTRA) 400-80 MG per tablet Take 1 tablet by mouth daily 30 tablet 11     traZODone (DESYREL) 50 MG tablet Take 0.5-2 tablets ( mg) by mouth At Bedtime 30 tablet 1     cyclobenzaprine (FLEXERIL) 5 MG tablet Take 1-2 tablets (5-10 mg) by mouth 3  times daily as needed for muscle spasms 30 tablet 3     predniSONE (DELTASONE) 10 MG tablet Take 1 tablet (10 mg) by mouth daily 21 tablet 3     LORazepam (ATIVAN) 0.5 MG tablet Take 1 tablet (0.5 mg) by mouth every 4 hours as needed (Anxiety, Nausea/Vomiting or Sleep) 30 tablet 5     prochlorperazine (COMPAZINE) 10 MG tablet Take 1 tablet (10 mg) by mouth every 6 hours as needed (Nausea/Vomiting) 30 tablet 5     traMADol (ULTRAM) 50 MG tablet Take 1 tablet (50 mg) by mouth every 8 hours as needed for moderate pain 45 tablet 0     Cholecalciferol (VITAMIN D) 2000 UNITS tablet Take 2,000 Units by mouth daily 100 tablet 3     magnesium oxide (MAG-OX) 400 (241.3 MG) MG tablet Take 2 tablets (800 mg) by mouth 2 times daily 120 tablet 3     amLODIPine (NORVASC) 10 MG tablet Take 1 tablet (10 mg) by mouth daily 90 tablet 3     lisinopril (PRINIVIL,ZESTRIL) 5 MG tablet Take 1 tablet (5 mg) by mouth daily 90 tablet 3     [START ON 6/8/2017] etoposide 50 MG capsule CHEMO Take 4 capsules (200 mg) by mouth daily for 2 days Day 2 and 3. 8 capsule 0     predniSONE (DELTASONE) 50 MG tablet Take 1 tablet (50 mg) by mouth 2 times daily for 5 days Days 1 through 5. 10 tablet 0     Physical Examination:  General: The patient is a pleasant male in no acute distress.  /67  Pulse 81  Temp 98.3  F (36.8  C) (Oral)  Resp 16  Wt 92.8 kg (204 lb 8 oz)  SpO2 98%  BMI 29.34 kg/m2  Wt Readings from Last 10 Encounters:   06/07/17 92.8 kg (204 lb 8 oz)   05/20/17 94.7 kg (208 lb 12.8 oz)   05/17/17 94.3 kg (208 lb)   05/17/17 94.7 kg (208 lb 12.8 oz)   04/26/17 94 kg (207 lb 4.8 oz)   04/26/17 94 kg (207 lb 4.8 oz)   04/18/17 93.3 kg (205 lb 11.2 oz)   04/05/17 93.6 kg (206 lb 6.4 oz)   04/03/17 90.7 kg (200 lb)   03/30/17 93.6 kg (206 lb 5.6 oz)   HEENT: EOMI, PERRL. Sclerae are anicteric. Oral mucosa is pink and moist with no lesions or thrush.   Lymph: Neck is supple with no lymphadenopathy in the cervical or supraclavicular  areas.   Heart: Regular rate and rhythm.   Lungs: Clear to auscultation bilaterally.   Abdomen: Bowel sounds present, soft, nontender with no palpable hepatosplenomegaly or masses.   Extremities: No lower extremity edema noted bilaterally.   Neuro: Cranial nerves II through XII are grossly intact.  Skin: No rashes, petechiae, or bruising noted on exposed skin.    Laboratory Data:   6/7/2017 10:52   Sodium 140   Potassium 3.8   Chloride 108   Carbon Dioxide 24   Urea Nitrogen 21   Creatinine 1.15   GFR Estimate 72   GFR Estimate If Black 87   Calcium 8.9   Anion Gap 8   Albumin 3.9   Protein Total 6.8   Bilirubin Total 0.8   Alkaline Phosphatase 71   ALT 21   AST 14   Glucose 87   WBC 3.8 (L)   Hemoglobin 12.6 (L)   Hematocrit 39.1 (L)   Platelet Count 87 (L)   RBC Count 4.11 (L)   MCV 95   MCH 30.7   MCHC 32.2   RDW 14.2   Diff Method Automated Method   % Neutrophils 80.1   % Lymphocytes 6.5   % Monocytes 11.3   % Eosinophils 1.0   % Basophils 0.8   % Immature Granulocytes 0.3   Nucleated RBCs 0   Absolute Neutrophil 3.1   Absolute Lymphocytes 0.3 (L)   Absolute Monocytes 0.4   Absolute Eosinophils 0.0   Absolute Basophils 0.0   Abs Immature Granulocytes 0.0   Absolute Nucleated RBC 0.0     Assessment and Plan:  1. Stage III PTLD EBV-positive monomorphic DLBCL. Patient received 4 weekly doses of Rituxan with a partial response. He then started on R-CEOP with Neulasta support on 4/5/17. Etoposide is given IV on day 1 and oral on days 2 and 3. He tolerated this fairly well with some back and hip pain/spasm associated with Neulasta with cycles 1 and 2, but not with 3. He will continue with cycle 4 today. He will have a PET/CT on 7/19 and follow up with Dr. Narayanan on 7/20.    2. Papillary thyroid carcinoma. Will defer surgical resection until after completion of 4 cycles of R-CEOP. Will schedule him to see Dr. Soto the end of July following his PET/CT.     3. History of kidney and liver transplant. No current concerns  for rejection. Remains on prednisone 10 mg daily. He is off Prograf and Cellcept due to chemotherapy. Recommend he discuss timing of resuming either of these medications with his transplant team. I have also messaged Layla Sidhu and Cole.    4. EBV viremia. Resolved after 4 weekly doses of Rituxan. Last level on 3/8/17 undetectable.     5. Hypertension. BP is under good control. He remains on amlodipine 10 mg qhs and lisinopril 5 mg daily.     6. Back/hip muscle spasm. Recommend using Claritin and Flexeril if recurs with Neulasta.     7. Insomnia. Okay to continue to use trazodone or Ativan.     Krystina Durham PA-C  [facility]  [address], [place] [id number] [phone]

## 2017-06-10 ENCOUNTER — ALLIED HEALTH/NURSE VISIT (OUTPATIENT)
Dept: ONCOLOGY | Facility: CLINIC | Age: 37
End: 2017-06-10
Attending: INTERNAL MEDICINE
Payer: COMMERCIAL

## 2017-06-10 VITALS
RESPIRATION RATE: 16 BRPM | SYSTOLIC BLOOD PRESSURE: 114 MMHG | HEART RATE: 73 BPM | DIASTOLIC BLOOD PRESSURE: 72 MMHG | OXYGEN SATURATION: 98 % | TEMPERATURE: 98 F

## 2017-06-10 DIAGNOSIS — D47.Z1 POST-TRANSPLANT LYMPHOPROLIFERATIVE DISORDER (H): Primary | ICD-10-CM

## 2017-06-10 PROCEDURE — 25000128 H RX IP 250 OP 636: Mod: ZF | Performed by: PHYSICIAN ASSISTANT

## 2017-06-10 PROCEDURE — 96372 THER/PROPH/DIAG INJ SC/IM: CPT

## 2017-06-10 RX ADMIN — PEGFILGRASTIM 6 MG: 6 INJECTION SUBCUTANEOUS at 11:50

## 2017-06-10 ASSESSMENT — PAIN SCALES - GENERAL: PAINLEVEL: NO PAIN (0)

## 2017-06-10 NOTE — PATIENT INSTRUCTIONS
Contact Numbers    [facility] [place]: [phone]  [facility] Triage:  [phone]    Call triage with chills and/or temperature greater than or equal to 100.5, uncontrolled nausea/vomiting, diarrhea, constipation, dizziness, shortness of breath, chest pain, bleeding, unexplained bruising, or any new/concerning symptoms, questions/concerns.     If you are having any concerning symptoms or wish to speak to a provider before your next infusion visit, please call your care coordinator or triage to notify them so we can adequately serve you.       After Hours: [phone]    If after hours, weekends, or holidays, call main hospital  and ask for Oncology doctor on call.           June 2017 Sunday Monday Tuesday Wednesday Thursday Friday Saturday                       1     2     3       4     5     6     7     [facility] MASON[facility] LAB DRAW   10:30 AM   (15 min.)    [facility]   [facility]     [facility] RETURN   10:55 AM   (50 min.)   Krystina Durham PA-C   [facility] ONC INFUSION 360   12:00 PM   (360 min.)    ONCOLOGY INFUSION   [facility] 8     9     10     UMP INJECTION   11:45 AM   (15 min.)   Nurse,  Oncology Injection   [facility]   11     12     13     14     15     16     17       18     19     20     21     22     23     24       25     26     LAB WITH  CLINIC   11:00 AM   (15 min.)    LAB   [facility] Lab     [facility] LIVER TX RETURN   11:35 AM   (20 min.)   Laureen Galvan MD   [facility] Hepatology 27     28     29     30                     July 2017 Sunday Monday Tuesday Wednesday Thursday Friday Saturday                                 1       2     3     4     5     6     7     8       9     10     11     12     13     14     15       16     17     18     19     [facility] ONCOLOGY   10:30 AM   (45 min.)   UUPET1   [facility] PET CT 20     P MASONIC LAB DRAW    3:00 PM   (15 min.)    MASONIC LAB DRAW    [facility] Mason[facility] Lab Draw     P ONC RETURN    3:30 PM   (30 min.)   Girish Narayanan MD   [facility] Blood and Marrow Transplant 21     22       23     24     25     26     27     28     29       30     31                                        No results found for this or any previous visit (from the past 24 hour(s)).

## 2017-06-10 NOTE — MR AVS SNAPSHOT
After Visit Summary   6/10/2017    Cricket Chen    MRN: [id number]           Patient Information     Date Of Birth          1980        Visit Information        Provider Department      6/10/2017 12:00 PM Nurse,  Oncology Injection [facility]        Today's Diagnoses     Post-transplant lymphoproliferative disorder (H)    -  1      Care Instructions    Contact Numbers    [facility] [place]: [phone]  [facility] [place]riage:  [phone]    Call triage with chills and/or temperature greater than or equal to 100.5, uncontrolled nausea/vomiting, diarrhea, constipation, dizziness, shortness of breath, chest pain, bleeding, unexplained bruising, or any new/concerning symptoms, questions/concerns.     If you are having any concerning symptoms or wish to speak to a provider before your next infusion visit, please call your care coordinator or triage to notify them so we can adequately serve you.       After Hours: [phone]    If after hours, weekends, or holidays, call main hospital  and ask for Oncology doctor on call.           June 2017 Sunday Monday Tuesday Wednesday Thursday Friday Saturday                       1     2     3       4     5     6     7     P [facility]ON[facility] LAB DRAW   10:30 AM   (15 min.)   [facility] LAB DRAW   [facility] Lab Draw     UMP RETURN   10:55 AM   (50 min.)   Krystina Durham PA-C   [facility]   12:00 PM   (360 min.)    ONCOLOGY INFUSION   [facility] 8     9     10     UMP INJECTION   11:45 AM   (15 min.)   Nurse,  Oncology Injection   [facility]   11     12     13     14     15     16     17       18     19     20     21     22     23     24       25     26     LAB WITH HB CLINIC   11:00 AM   (15 min.)    LAB   [facility] Lab     P LIVER TX RETURN   11:35 AM   (20 min.)   Laureen Galvan MD   [facility] Hepatology 27     28     29     30                      July 2017 Sunday Monday Tuesday Wednesday Thursday Friday Saturday                                 1       2     3     4     5     6     7     8       9     10     11     12     13     14     15       16     17     18     19     PE EYE/TH ONCOLOGY   10:30 AM   (45 min.)   [facility]     [facility] LAB DRAW    3:00 PM   (15 min.)    MASONIC LAB DRAW   [facility] Masonic Lab Draw     [facility] ONC RETURN    3:30 PM   (30 min.)   Girish Narayanan MD   [facility] Blood and Marrow Transplant 21     22       23     24     25     26     27     28     29       30     31                                        No results found for this or any previous visit (from the past 24 hour(s)).              Follow-ups after your visit        Your next 10 appointments already scheduled     Jun 26, 2017 11:00 AM CDT   Lab with  LAB   [facility] Lab [facility])    [address] [place] [id number] [phone]            Jun 26, 2017 11:50 AM CDT   (Arrive by 11:35 AM)   Return Liver Transplant with Laureen Galvan MD   [facility] Hepatology ([facility])    [address] [place] [address]  3rd [facility] [address] [phone]            Jul 19, 2017 10:30 AM CDT   PE NPET ONCOLOGY (EYES TO THIGHS) with UUPET1   [facility] ([facility])    [address] [facility] [address] [phone]           Tell your doctor:   If there is any chance you may be pregnant or if you are breastfeeding.   If you have problems lying in small spaces (claustrophobia). If you do, your doctor may give you medicine to help you relax. If you have diabetes:   Have your exam early in the morning. Your blood glucose will go up as the day goes by.   Your glucose level must be 180 or less at the start of the exam. Please take any medicines you need to ensure this blood  glucose level. 24 hours before your scan: Don t do any heavy exercise. (No jogging, aerobics or other workouts.) Exercise will make your pictures less accurate. 6 hours before your scan:   Stop all food and liquids (except water).   Do not chew gum or suck on mints.   If you need to take medicine with food, you may take it with a few crackers.  Please call your Imaging Department at your exam site with any questions.            Jul 20, 2017  3:00 PM CDT   [facility] Lab Draw with  [employer] LAB DRAW   [facility])    [address] [place] [address] [phone]            Jul 20, 2017  3:30 PM CDT   RETURN ONC with Girish Narayanan MD   [facility] Blood and Marrow Transplant ([facility])    [address] [place] [address] [phone]              Who to contact     If you have questions or need follow up information about today's clinic visit or your schedule please contact [facility] CANCER CLINIC directly at [phone].  Normal or non-critical lab and imaging results will be communicated to you by MyChart, letter or phone within 4 business days after the clinic has received the results. If you do not hear from us within 7 days, please contact the clinic through [employer] or phone. If you have a critical or abnormal lab result, we will notify you by phone as soon as possible.  Submit refill requests through [employer] or call your pharmacy and they will forward the refill request to us. Please allow 3 business days for your refill to be completed.          Additional Information About Your Visit        MyChart Information     [employer] gives you secure access to your electronic health record. If you see a primary care provider, you can also send messages to your care team and make appointments. If you have questions, please call your primary care clinic.  If you do not have a primary  care provider, please call [phone] and they will assist you.        Care EveryWhere ID     This is your Care EveryWhere ID. This could be used by other organizations to access your [place] medical records  [id number]        Your Vitals Were     Pulse Temperature Respirations Pulse Oximetry          73 98  F (36.7  C) (Oral) 16 98%         Blood Pressure from Last 3 Encounters:   06/10/17 114/72   06/07/17 126/67   05/20/17 126/69    Weight from Last 3 Encounters:   06/07/17 92.8 kg (204 lb 8 oz)   05/20/17 94.7 kg (208 lb 12.8 oz)   05/17/17 94.3 kg (208 lb)              Today, you had the following     No orders found for display       Primary Care Provider Office Phone # Fax #    Eugenia Perez [phone]       [address]  [facility] [address]        Thank you!     Thank you for choosing [facility] CANCER CLINIC  for your care. Our goal is always to provide you with excellent care. Hearing back from our patients is one way we can continue to improve our services. Please take a few minutes to complete the written survey that you may receive in the mail after your visit with us. Thank you!             Your Updated Medication List - Protect others around you: Learn how to safely use, store and throw away your medicines at www.disposemymeds.org.          This list is accurate as of: 6/10/17 12:43 PM.  Always use your most recent med list.                   Brand Name Dispense Instructions for use    amLODIPine 10 MG tablet    NORVASC    90 tablet    Take 1 tablet (10 mg) by mouth daily       cyclobenzaprine 5 MG tablet    FLEXERIL    30 tablet    Take 1-2 tablets (5-10 mg) by mouth 3 times daily as needed for muscle spasms       etoposide 50 MG capsule CHEMO     8 capsule    Take 4 capsules (200 mg) by mouth daily for 2 days Day 2 and 3.       lisinopril 5 MG tablet    PRINIVIL/ZESTRIL    90 tablet    Take 1 tablet (5 mg) by mouth daily        LORazepam 0.5 MG tablet    ATIVAN    30 tablet    Take 1 tablet (0.5 mg) by mouth every 4 hours as needed (Anxiety, Nausea/Vomiting or Sleep)       magnesium oxide 400 (241.3 MG) MG tablet    MAG-OX    120 tablet    Take 2 tablets (800 mg) by mouth 2 times daily       * predniSONE 10 MG tablet    DELTASONE    21 tablet    Take 1 tablet (10 mg) by mouth daily       * predniSONE 50 MG tablet    DELTASONE    10 tablet    Take 1 tablet (50 mg) by mouth 2 times daily for 5 days Days 1 through 5.       prochlorperazine 10 MG tablet    COMPAZINE    30 tablet    Take 1 tablet (10 mg) by mouth every 6 hours as needed (Nausea/Vomiting)       sulfamethoxazole-trimethoprim 400-80 MG per tablet    BACTRIM/SEPTRA    30 tablet    Take 1 tablet by mouth daily       traMADol 50 MG tablet    ULTRAM    45 tablet    Take 1 tablet (50 mg) by mouth every 8 hours as needed for moderate pain       traZODone 50 MG tablet    DESYREL    30 tablet    Take 0.5-2 tablets ( mg) by mouth At Bedtime       vitamin D 2000 UNITS tablet     100 tablet    Take 2,000 Units by mouth daily       * Notice:  This list has 2 medication(s) that are the same as other medications prescribed for you. Read the directions carefully, and ask your doctor or other care provider to review them with you.

## 2017-06-10 NOTE — PROGRESS NOTES
[facility]. Pt feeling well today with no new complaints. Vitals stable. Neulasta injection given to left arm without incident. Pt discharged in care of self and is aware of next appointment.

## 2017-06-13 DIAGNOSIS — E83.42 HYPOMAGNESEMIA: ICD-10-CM

## 2017-06-13 NOTE — TELEPHONE ENCOUNTER
Last Office Visit with Nephrologist:  4/18/17.  Medication refilled per Nephrology Clinic protocol.     Ban Ward RN

## 2017-06-16 ENCOUNTER — TELEPHONE (OUTPATIENT)
Dept: TRANSPLANT | Facility: CLINIC | Age: 37
End: 2017-06-16

## 2017-06-16 DIAGNOSIS — Z94.4 LIVER REPLACED BY TRANSPLANT (H): ICD-10-CM

## 2017-06-16 DIAGNOSIS — Z94.0 S/P KIDNEY TRANSPLANT: Primary | ICD-10-CM

## 2017-06-16 RX ORDER — PREDNISONE 5 MG/1
5 TABLET ORAL DAILY
Qty: 90 TABLET | Refills: 3 | Status: SHIPPED | OUTPATIENT
Start: 2017-06-16 | End: 2018-07-16

## 2017-06-16 RX ORDER — TACROLIMUS 1 MG/1
1 CAPSULE ORAL 2 TIMES DAILY
Qty: 180 CAPSULE | Refills: 3 | Status: SHIPPED | OUTPATIENT
Start: 2017-06-16 | End: 2017-06-21

## 2017-06-16 NOTE — TELEPHONE ENCOUNTER
After review with Dr Galvan and Dr Sidhu, patient has finished chemo and will restart on prograf and have level 3-5 and prednisone 5 mg daily. Pt is aware of the plan and will do prograf level next week. Will re discuss IS plan once we hear from oncology regarding thyroid treatment.

## 2017-06-20 DIAGNOSIS — Z94.0 S/P KIDNEY TRANSPLANT: ICD-10-CM

## 2017-06-20 DIAGNOSIS — Z94.4 LIVER REPLACED BY TRANSPLANT (H): ICD-10-CM

## 2017-06-20 LAB
TACROLIMUS BLD-MCNC: ABNORMAL UG/L (ref 5–15)
TME LAST DOSE: ABNORMAL H

## 2017-06-20 PROCEDURE — 36415 COLL VENOUS BLD VENIPUNCTURE: CPT | Performed by: INTERNAL MEDICINE

## 2017-06-20 PROCEDURE — 80197 ASSAY OF TACROLIMUS: CPT | Performed by: INTERNAL MEDICINE

## 2017-06-21 ENCOUNTER — TELEPHONE (OUTPATIENT)
Dept: TRANSPLANT | Facility: CLINIC | Age: 37
End: 2017-06-21

## 2017-06-21 DIAGNOSIS — Z94.0 S/P KIDNEY TRANSPLANT: ICD-10-CM

## 2017-06-21 DIAGNOSIS — Z94.4 LIVER REPLACED BY TRANSPLANT (H): ICD-10-CM

## 2017-06-21 RX ORDER — TACROLIMUS 1 MG/1
2 CAPSULE ORAL 2 TIMES DAILY
Qty: 120 CAPSULE | Refills: 11 | Status: SHIPPED | OUTPATIENT
Start: 2017-06-21 | End: 2018-02-12

## 2017-06-21 NOTE — TELEPHONE ENCOUNTER
Pt's prograf level < 3. Accurate level. Patient will increase to 2 mg BID of prograf. Pt will repeat labs on Monday at appt with Dr bach.

## 2017-06-29 DIAGNOSIS — Z94.0 S/P KIDNEY TRANSPLANT: ICD-10-CM

## 2017-06-29 DIAGNOSIS — N25.81 SECONDARY RENAL HYPERPARATHYROIDISM (H): ICD-10-CM

## 2017-06-29 DIAGNOSIS — Z94.4 LIVER REPLACED BY TRANSPLANT (H): ICD-10-CM

## 2017-06-29 DIAGNOSIS — E55.9 VITAMIN D DEFICIENCY: ICD-10-CM

## 2017-06-29 LAB
DEPRECATED CALCIDIOL+CALCIFEROL SERPL-MC: 38 UG/L (ref 20–75)
PTH-INTACT SERPL-MCNC: 173 PG/ML (ref 12–72)
TACROLIMUS BLD-MCNC: 4.6 UG/L (ref 5–15)
TME LAST DOSE: 2245 H

## 2017-06-29 PROCEDURE — 83970 ASSAY OF PARATHORMONE: CPT | Performed by: INTERNAL MEDICINE

## 2017-06-29 PROCEDURE — 82306 VITAMIN D 25 HYDROXY: CPT | Performed by: INTERNAL MEDICINE

## 2017-06-29 PROCEDURE — 80197 ASSAY OF TACROLIMUS: CPT | Performed by: INTERNAL MEDICINE

## 2017-06-29 PROCEDURE — 36415 COLL VENOUS BLD VENIPUNCTURE: CPT | Performed by: INTERNAL MEDICINE

## 2017-06-30 ENCOUNTER — OFFICE VISIT (OUTPATIENT)
Dept: OTOLARYNGOLOGY | Facility: CLINIC | Age: 37
End: 2017-06-30

## 2017-06-30 VITALS — HEIGHT: 70 IN | BODY MASS INDEX: 29.06 KG/M2 | WEIGHT: 203 LBS

## 2017-06-30 DIAGNOSIS — E04.1 THYROID NODULE: Primary | ICD-10-CM

## 2017-06-30 PROBLEM — R79.89 ABNORMAL LIVER FUNCTION TESTS: Status: ACTIVE | Noted: 2017-06-30

## 2017-06-30 ASSESSMENT — PAIN SCALES - GENERAL: PAINLEVEL: NO PAIN (0)

## 2017-06-30 NOTE — NURSING NOTE
Chief Complaint   Patient presents with     RECHECK     thyroid issues     Shaina Elaine Medical Assistant

## 2017-06-30 NOTE — PATIENT INSTRUCTIONS
1.  You were seen in the ENT Clinic today by Dr. Soto.  If you have any questions or concerns after your appointment, please call [phone].  Press option #1 for scheduling related needs.  Press option #3 for Nurse advice.  2.  Plan is to schedule US of the neck and head.      Krystina BANGURAN, RN  [facility]   Head & Neck Surgery

## 2017-06-30 NOTE — LETTER
6/30/2017       RE: Cricket Chen  [address]  [facility] [address]     Dear Colleague,    Thank you for referring your patient, Cricket Chen, to the [facility] EAR NOSE AND THROAT at [facility]. Please see a copy of my visit note below.    June 30, 2017      HISTORY OF PRESENT ILLNESS:  Mr. Chen is a delightful 36-year-old post-transplant patient whom I brought to the operating room in January 2017 to excise two large neck nodes consistent with post-transplant lymphoproliferative disorder, EBV positive.  He was then diagnosed with a new papillary carcinoma of the right tumor by Dr. Gray from Endocrinology in March.    Lindsay Gray and Oracio agreed to focus first on the lymphoma and he has recently completed his chemotherapy.  He is here to talk about next steps for the thyroid cancer.     PHYSICAL EXAMINATION:  Mr Chen looks surprsingly fit.  He is in good spirits.  His shoulder function is normal now after PT following the neck surgery.  The thyroid mass is not palapable.     IMPRESSION AND PLAN:    I reviewed the scans with Mr. Chen.   He understands that his papillary thryoid cancer is <1 cm, and therefore carries a good prognosis in the absence of co-morbidity.  We talked about how the field is moving towards more observation of these lesions.      However, we agreed that his oncologic history is more worrisome, and that he may have mutations or treatment that make his tumor potentially more aggressive.  We talked about thais- v total thyroidectomy, and agreed that despite the small size of the mass, that a total thyroidectomy would be most straightforward for him.      We also talked about the need to make sure there are not metastatic nodes, and the current U/S did not look for nodes.  We will obtain an U/S of the necks to see if there are nodes without rom in the local area that would require simultaenous dissection.       The patient and I discussed the risks of the procedure.  They include but are not limited to the risk of bleeding, infection, numbness in the ear and cheek and face, and scarring in the incision line.  We try to hide the incision in a skin crease, and within a year it would be much less visible. We also discussed the small risk of unilateral vocal cord injury due to recurrent laryngeal nerve injury that can result in hoarseness and aspiration. Often this is transient, but if permanent, we treat the symptoms with a vocal cord implant.   However, it would not restore a good singing voice, but he says that he is not a xavier.   The incidence of bilateral simultaneous injury is very rare, but in that event, there could be need for a tracheotomy.  We also talked about the risk of injury to all four parathyroid glands, resulting in permanent hypocalcemia.  This is a complicated problem that would require calcium pills and vitamin D three times a day for the rest of the patient's life.  The patient understands the risks of the procedure.      After discussing the risks and benefits of the surgery, which include improved odds of controlling the cancer, the patient is comfortable with proceeding with surgery.  We will obtain his neck U/S and discuss his case at the Tumor Board.      Beka Soto M.D.  Otolaryngology/Head & Neck Surgery  [phone]        Antoinette Galvan MD   [facility] 163      Jake Sidhu MD   Mail Code [phone]     Eugenia Perez MD   [facility] 616     Zuleyma Gray MD  Division of Diabetes and Endocrinology  Department of Medicine    Girish Narayanan MD  Hematology Oncology  Department of Medicine

## 2017-06-30 NOTE — MR AVS SNAPSHOT
After Visit Summary   6/30/2017    Cricket Chen    MRN: [id number]           Patient Information     Date Of Birth          1980        Visit Information        Provider Department      6/30/2017 3:30 PM Beka Soto MD [facility] Ear Nose and Throat        Today's Diagnoses     Thyroid nodule    -  1      Care Instructions    1.  You were seen in the ENT Clinic today by Dr. Soto.  If you have any questions or concerns after your appointment, please call [phone].  Press option #1 for scheduling related needs.  Press option #3 for Nurse advice.  2.  Plan is to schedule US of the neck and head.      Krystina DUVALL, RN  [facility] ENT   Head & Neck Surgery               Follow-ups after your visit        Your next 10 appointments already scheduled     Jul 19, 2017 10:30 AM CDT   PE NPET ONCOLOGY (EYES TO THIGHS) with UUPET1   [facility], [address] ([facility] [place])    [address] [facility] [address] [phone]           Tell your doctor:   If there is any chance you may be pregnant or if you are breastfeeding.   If you have problems lying in small spaces (claustrophobia). If you do, your doctor may give you medicine to help you relax. If you have diabetes:   Have your exam early in the morning. Your blood glucose will go up as the day goes by.   Your glucose level must be 180 or less at the start of the exam. Please take any medicines you need to ensure this blood glucose level. 24 hours before your scan: Don t do any heavy exercise. (No jogging, aerobics or other workouts.) Exercise will make your pictures less accurate. 6 hours before your scan:   Stop all food and liquids (except water).   Do not chew gum or suck on mints.   If you need to take medicine with food, you may take it with a few crackers.  Please call your Imaging Department at your exam site with any questions.            Jul 20, 2017  3:00 PM CDT    [facility] Lab Draw with  [facility] LAB DRAW   [facility] Masonic Lab Draw ([facility])    [address] [id number] [phone]            Jul 20, 2017  3:30 PM CDT   RETURN ONC with Girish Narayanan MD   [facility] Blood and Marrow Transplant ([facility])    [address] [id number] [phone]            Jan 16, 2018 12:10 PM CST   (Arrive by 11:55 AM)   Return Liver Transplant with Laureen Galvan MD   [facility] Hepatology ([facility])    [address] [id number] [phone]            Jan 16, 2018  2:20 PM CST   (Arrive by 1:50 PM)   Return Kidney Transplant with Jake Sidhu MD   [facility] Nephrology ([facility])    [address] [place] [id number] [phone]              Who to contact     Please call your clinic at [phone] to:    Ask questions about your health    Make or cancel appointments    Discuss your medicines    Learn about your test results    Speak to your doctor   If you have compliments or concerns about an experience at your clinic, or if you wish to file a complaint, please contact [facility] Patient Relations at [phone] or email us at [email or web address]         Additional Information About Your Visit        iMedicareharClearSaleing Information     Responsys gives you secure access to your electronic health record. If you see a primary care provider, you can also send messages to your care team and make appointments. If you have questions, please call your primary care clinic.  If you do not have a primary care provider, please call [phone] and they will assist you.      Responsys is an electronic gateway that provides easy, online access to your medical records. With [employer]ys, you can request a clinic  "appointment, read your test results, renew a prescription or communicate with your care team.     To access your existing account, please contact your [facility] or call [phone] for assistance.        Care EveryWhere ID     This is your Care EveryWhere ID. This could be used by other organizations to access your [place] medical records  [id number]        Your Vitals Were     Height BMI (Body Mass Index)                1.778 m (5' 10\") 29.13 kg/m2           Blood Pressure from Last 3 Encounters:   06/10/17 114/72   06/07/17 126/67   05/20/17 126/69    Weight from Last 3 Encounters:   06/30/17 92.1 kg (203 lb)   06/07/17 92.8 kg (204 lb 8 oz)   05/20/17 94.7 kg (208 lb 12.8 oz)               Primary Care Provider Office Phone # Fax #    Eugenia Katerina Perez [phone]       [address] [address]        Equal Access to Services     CLAY VIDES : Hadii steffany ku hadasho Soomaali, waaxda luqadaha, qaybta kaalmada adeegyada, robina alcantar hayaquiles white . So [facility] [phone].    ATENCIÓN: Si habla español, tiene a laura disposición servicios gratuitos de asistencia lingüística. Ginaame al [phone].    We comply with applicable federal civil rights laws and [place] laws. We do not discriminate on the basis of race, color, national origin, age, disability sex, sexual orientation or gender identity.            Thank you!     Thank you for choosing [facility] EAR NOSE AND THROAT  for your care. Our goal is always to provide you with excellent care. Hearing back from our patients is one way we can continue to improve our services. Please take a few minutes to complete the written survey that you may receive in the mail after your visit with us. Thank you!             Your Updated Medication List - Protect others around you: Learn how to safely use, store and throw away your medicines at www.disposemymeds.org.        "   This list is accurate as of: 6/30/17 11:59 PM.  Always use your most recent med list.                   Brand Name Dispense Instructions for use Diagnosis    amLODIPine 10 MG tablet    NORVASC    90 tablet    Take 1 tablet (10 mg) by mouth daily    HTN (hypertension)       cyclobenzaprine 5 MG tablet    FLEXERIL    30 tablet    Take 1-2 tablets (5-10 mg) by mouth 3 times daily as needed for muscle spasms    Back muscle spasm       etoposide 50 MG capsule CHEMO     8 capsule    Take 4 capsules (200 mg) by mouth daily for 2 days Day 2 and 3.    Post-transplant lymphoproliferative disorder (H)       lisinopril 5 MG tablet    PRINIVIL/ZESTRIL    90 tablet    Take 1 tablet (5 mg) by mouth daily    HTN (hypertension)       LORazepam 0.5 MG tablet    ATIVAN    30 tablet    Take 1 tablet (0.5 mg) by mouth every 4 hours as needed (Anxiety, Nausea/Vomiting or Sleep)    Post-transplant lymphoproliferative disorder (H)       magnesium oxide 400 (241.3 MG) MG tablet    MAG-OX    120 tablet    Take 2 tablets (800 mg) by mouth 2 times daily    Hypomagnesemia       predniSONE 5 MG tablet    DELTASONE    90 tablet    Take 1 tablet (5 mg) by mouth daily    Liver replaced by transplant (H), S/P kidney transplant       prochlorperazine 10 MG tablet    COMPAZINE    30 tablet    Take 1 tablet (10 mg) by mouth every 6 hours as needed (Nausea/Vomiting)    Post-transplant lymphoproliferative disorder (H)       sulfamethoxazole-trimethoprim 400-80 MG per tablet    BACTRIM/SEPTRA    30 tablet    Take 1 tablet by mouth daily    Liver replaced by transplant (H), S/P kidney transplant       tacrolimus 1 MG capsule    PROGRAF - GENERIC EQUIVALENT    120 capsule    Take 2 capsules (2 mg) by mouth 2 times daily    Liver replaced by transplant (H), S/P kidney transplant       traMADol 50 MG tablet    ULTRAM    45 tablet    Take 1 tablet (50 mg) by mouth every 8 hours as needed for moderate pain    Chronic pain of left knee       traZODone 50 MG  tablet    DESYREL    30 tablet    Take 0.5-2 tablets ( mg) by mouth At Bedtime    Other insomnia       vitamin D 2000 UNITS tablet     100 tablet    Take 2,000 Units by mouth daily    Vitamin D deficiency

## 2017-06-30 NOTE — PROGRESS NOTES
June 30, 2017      HISTORY OF PRESENT ILLNESS:  Mr. Chen is a delightful 36-year-old post-transplant patient whom I brought to the operating room in January 2017 to excise two large neck nodes consistent with post-transplant lymphoproliferative disorder, EBV positive.  He was then diagnosed with a new papillary carcinoma of the right tumor by Dr. Gray from Endocrinology in March.    Lindsay Gray and Oracio agreed to focus first on the lymphoma and he has recently completed his chemotherapy.  He is here to talk about next steps for the thyroid cancer.     PHYSICAL EXAMINATION:  Mr Chen looks surprsingly fit.  He is in good spirits.  His shoulder function is normal now after PT following the neck surgery.  The thyroid mass is not palapable.     IMPRESSION AND PLAN:    I reviewed the scans with Mr. Chen.   He understands that his papillary thryoid cancer is <1 cm, and therefore carries a good prognosis in the absence of co-morbidity.  We talked about how the field is moving towards more observation of these lesions.      However, we agreed that his oncologic history is more worrisome, and that he may have mutations or treatment that make his tumor potentially more aggressive.  We talked about thais- v total thyroidectomy, and agreed that despite the small size of the mass, that a total thyroidectomy would be most straightforward for him.      We also talked about the need to make sure there are not metastatic nodes, and the current U/S did not look for nodes.  We will obtain an U/S of the necks to see if there are nodes without rom in the local area that would require simultaenous dissection.      The patient and I discussed the risks of the procedure.  They include but are not limited to the risk of bleeding, infection, numbness in the ear and cheek and face, and scarring in the incision line.  We try to hide the incision in a skin crease, and within a year it would be much less  visible. We also discussed the small risk of unilateral vocal cord injury due to recurrent laryngeal nerve injury that can result in hoarseness and aspiration. Often this is transient, but if permanent, we treat the symptoms with a vocal cord implant.   However, it would not restore a good singing voice, but he says that he is not a xavier.   The incidence of bilateral simultaneous injury is very rare, but in that event, there could be need for a tracheotomy.  We also talked about the risk of injury to all four parathyroid glands, resulting in permanent hypocalcemia.  This is a complicated problem that would require calcium pills and vitamin D three times a day for the rest of the patient's life.  The patient understands the risks of the procedure.      After discussing the risks and benefits of the surgery, which include improved odds of controlling the cancer, the patient is comfortable with proceeding with surgery.  We will obtain his neck U/S and discuss his case at the Tumor Board.      Beka Soto M.D.  Otolaryngology/Head & Neck Surgery  [phone]            Antoinette Galvan MD   [facility] 084      Jake Sidhu MD   Mail Code [id number]     Eugenia Perez MD        Zuleyma Gray MD  Division of Diabetes and Endocrinology  Department of Medicine    Girish aNrayanan MD  Hematology Oncology  Department of Medicine

## 2017-07-06 NOTE — TELEPHONE ENCOUNTER
Head & Neck Tumor Conference Note   7/6/2017    Status: Return  Staff: Brittany    Tumor Site: Thyroid  Tumor Stage: papillary, T1N0M0    Brief History: 36 y.o. Male with end stage kidney disease and liver disease secondary to alcoholic cirrhosis, status post DDKT and liver transplant on 5/11/16. Previous cervical LAD s/p right level II-III dissection, c/w Post transplant lymphoproliferative disorder, monomorphic, EBV positive. Maintained on immunosuppression with tacrolimus and CellCept. Now with new right thyroid nodule. FNA c/w papillary thyroid.     Reason for Review: discuss treatment plan, thais- v total thyroidectomy    Imaging:   PET 2/4/2017  IMPRESSION:   1. Hypermetabolic right axillary lymph node, suspicious for  involvement by lymphoproliferative disease.  2. Hypermetabolic lesion in the right thyroid with a max SUV of 12.7.  No definite CT correlate identified. Ultrasound of the thyroid is  recommended.  3.  Indeterminant 2.2 cm area of decreased enhancement within the  right lower quadrant transplant kidney. It does not appear to be  masslike. Ultrasound is recommended for further characterization  4. Postsurgical changes of liver and right lower quadrant kidney  transplantation.  5. Transposition of great vessels with postsurgical changes of atrial  baffle procedure resulting in a large, presumably intentional atrial  septal defect.    CT Neck 3/28/2017  Impression:  1. Compared to PET/CT 2/4/2017, stable disease without evidence  cervical lymphadenopathy. Borderline enlarged lymph node by size  criteria at right level 3 is unchanged. Recommend continued  surveillance.  2. Right thyroid 1 cm heterogeneous nodule consistent with recently  diagnosed papillary cell carcinoma.     US Head/Neck  7/3/2017  IMPRESSION:  1.  Lymph nodes as described without malignant features.  2.  Partially visualized thyroid nodules, not significantly changed  from 3/16/2017.  Small LNs right level 4,     Pathology:   Thyroid  nodule FNA  CYTOLOGIC INTERPRETATION:     Thyroid, right #2, ultrasound-guided fine needle aspiration:   Positive   for malignancy.   Papillary thyroid carcinoma   Specimen Adequacy: Satisfactory for evaluation.     The [place] Implied Risk of Malignancy and Recommended Clinical   Management:   Positive for Malignancy has a 97-99% risk of malignancy, recommended   management is near-total thyroidectomy.     Tumor Board Recommendation:   Board agreed with aggressive treatment despite small tumor.  Nodes in neck may be normal, but since architecture was not commented on in report, Dr Jc will look into imaging and get back to team on her findings.

## 2017-07-07 ENCOUNTER — TEAM CONFERENCE (OUTPATIENT)
Dept: OTOLARYNGOLOGY | Facility: CLINIC | Age: 37
End: 2017-07-07
Attending: OTOLARYNGOLOGY

## 2017-07-07 NOTE — TELEPHONE ENCOUNTER
RN called pt to inform of TB recommendations.  Radiologist will take a closer look at US and determine if there is kenney involvement.  Plan for total thyroid, possible ND.      Krystina Olson BSN, RN  [phone]  [facility] ENT   Head & Neck Surgery

## 2017-07-10 DIAGNOSIS — C73 MALIGNANT NEOPLASM OF THYROID GLAND (H): ICD-10-CM

## 2017-07-10 DIAGNOSIS — E04.1 THYROID NODULE: Primary | ICD-10-CM

## 2017-07-19 ENCOUNTER — HOSPITAL ENCOUNTER (OUTPATIENT)
Dept: PET IMAGING | Facility: CLINIC | Age: 37
Discharge: HOME | End: 2017-07-19
Attending: PHYSICIAN ASSISTANT | Admitting: PHYSICIAN ASSISTANT
Payer: COMMERCIAL

## 2017-07-19 ENCOUNTER — HOSPITAL ENCOUNTER (OUTPATIENT)
Dept: PET IMAGING | Facility: CLINIC | Age: 37
End: 2017-07-19
Attending: PHYSICIAN ASSISTANT
Payer: COMMERCIAL

## 2017-07-19 DIAGNOSIS — D47.Z1 POST-TRANSPLANT LYMPHOPROLIFERATIVE DISORDER (H): ICD-10-CM

## 2017-07-19 LAB
CREAT BLD-MCNC: 1.3 MG/DL (ref 0.66–1.25)
GFR SERPL CREATININE-BSD FRML MDRD: 62 ML/MIN/1.7M2
GLUCOSE BLDC GLUCOMTR-MCNC: 93 MG/DL (ref 70–99)

## 2017-07-19 PROCEDURE — A9552 F18 FDG: HCPCS | Performed by: PHYSICIAN ASSISTANT

## 2017-07-19 PROCEDURE — 82962 GLUCOSE BLOOD TEST: CPT

## 2017-07-19 PROCEDURE — 82565 ASSAY OF CREATININE: CPT

## 2017-07-19 PROCEDURE — 70491 CT SOFT TISSUE NECK W/DYE: CPT

## 2017-07-19 PROCEDURE — 34300033 ZZH RX 343: Performed by: PHYSICIAN ASSISTANT

## 2017-07-19 PROCEDURE — 25000128 H RX IP 250 OP 636: Performed by: PHYSICIAN ASSISTANT

## 2017-07-19 PROCEDURE — 71260 CT THORAX DX C+: CPT

## 2017-07-19 PROCEDURE — 74177 CT ABD & PELVIS W/CONTRAST: CPT

## 2017-07-19 RX ORDER — IOPAMIDOL 755 MG/ML
10-140 INJECTION, SOLUTION INTRAVASCULAR ONCE
Status: COMPLETED | OUTPATIENT
Start: 2017-07-19 | End: 2017-07-19

## 2017-07-19 RX ADMIN — SODIUM CHLORIDE, PRESERVATIVE FREE 500 UNITS: 5 INJECTION INTRAVENOUS at 12:04

## 2017-07-19 RX ADMIN — IOPAMIDOL 126 ML: 755 INJECTION, SOLUTION INTRAVENOUS at 12:02

## 2017-07-19 RX ADMIN — FLUDEOXYGLUCOSE F-18 12.93 MCI.: 500 INJECTION, SOLUTION INTRAVENOUS at 10:40

## 2017-07-20 ENCOUNTER — APPOINTMENT (OUTPATIENT)
Dept: LAB | Facility: CLINIC | Age: 37
End: 2017-07-20
Attending: INTERNAL MEDICINE
Payer: COMMERCIAL

## 2017-07-20 ENCOUNTER — OFFICE VISIT (OUTPATIENT)
Dept: TRANSPLANT | Facility: CLINIC | Age: 37
End: 2017-07-20
Attending: INTERNAL MEDICINE
Payer: COMMERCIAL

## 2017-07-20 VITALS
OXYGEN SATURATION: 99 % | WEIGHT: 205.7 LBS | BODY MASS INDEX: 29.51 KG/M2 | SYSTOLIC BLOOD PRESSURE: 120 MMHG | DIASTOLIC BLOOD PRESSURE: 71 MMHG | TEMPERATURE: 98 F | HEART RATE: 69 BPM | RESPIRATION RATE: 16 BRPM

## 2017-07-20 DIAGNOSIS — D47.Z1 POST-TRANSPLANT LYMPHOPROLIFERATIVE DISORDER (H): Primary | ICD-10-CM

## 2017-07-20 LAB
ALBUMIN SERPL-MCNC: 4.5 G/DL (ref 3.4–5)
ALP SERPL-CCNC: 56 U/L (ref 40–150)
ALT SERPL W P-5'-P-CCNC: 23 U/L (ref 0–70)
ANION GAP SERPL CALCULATED.3IONS-SCNC: 7 MMOL/L (ref 3–14)
AST SERPL W P-5'-P-CCNC: 17 U/L (ref 0–45)
BASOPHILS # BLD AUTO: 0 10E9/L (ref 0–0.2)
BASOPHILS NFR BLD AUTO: 0.6 %
BILIRUB SERPL-MCNC: 0.6 MG/DL (ref 0.2–1.3)
BUN SERPL-MCNC: 18 MG/DL (ref 7–30)
CALCIUM SERPL-MCNC: 9 MG/DL (ref 8.5–10.1)
CHLORIDE SERPL-SCNC: 108 MMOL/L (ref 94–109)
CO2 SERPL-SCNC: 23 MMOL/L (ref 20–32)
CREAT SERPL-MCNC: 1.26 MG/DL (ref 0.66–1.25)
DIFFERENTIAL METHOD BLD: ABNORMAL
EOSINOPHIL # BLD AUTO: 0 10E9/L (ref 0–0.7)
EOSINOPHIL NFR BLD AUTO: 0.8 %
ERYTHROCYTE [DISTWIDTH] IN BLOOD BY AUTOMATED COUNT: 12.7 % (ref 10–15)
GFR SERPL CREATININE-BSD FRML MDRD: 64 ML/MIN/1.7M2
GLUCOSE SERPL-MCNC: 94 MG/DL (ref 70–99)
HCT VFR BLD AUTO: 41.8 % (ref 40–53)
HGB BLD-MCNC: 14 G/DL (ref 13.3–17.7)
IMM GRANULOCYTES # BLD: 0 10E9/L (ref 0–0.4)
IMM GRANULOCYTES NFR BLD: 1.1 %
LDH SERPL L TO P-CCNC: 190 U/L (ref 85–227)
LYMPHOCYTES # BLD AUTO: 0.3 10E9/L (ref 0.8–5.3)
LYMPHOCYTES NFR BLD AUTO: 8.7 %
MAGNESIUM SERPL-MCNC: 2.3 MG/DL (ref 1.6–2.3)
MCH RBC QN AUTO: 31.2 PG (ref 26.5–33)
MCHC RBC AUTO-ENTMCNC: 33.5 G/DL (ref 31.5–36.5)
MCV RBC AUTO: 93 FL (ref 78–100)
MONOCYTES # BLD AUTO: 0.3 10E9/L (ref 0–1.3)
MONOCYTES NFR BLD AUTO: 9.2 %
NEUTROPHILS # BLD AUTO: 2.8 10E9/L (ref 1.6–8.3)
NEUTROPHILS NFR BLD AUTO: 79.6 %
NRBC # BLD AUTO: 0 10*3/UL
NRBC BLD AUTO-RTO: 0 /100
PLATELET # BLD AUTO: 101 10E9/L (ref 150–450)
POTASSIUM SERPL-SCNC: 4.4 MMOL/L (ref 3.4–5.3)
PROT SERPL-MCNC: 7.1 G/DL (ref 6.8–8.8)
RBC # BLD AUTO: 4.49 10E12/L (ref 4.4–5.9)
SODIUM SERPL-SCNC: 138 MMOL/L (ref 133–144)
WBC # BLD AUTO: 3.6 10E9/L (ref 4–11)

## 2017-07-20 PROCEDURE — 83615 LACTATE (LD) (LDH) ENZYME: CPT | Performed by: INTERNAL MEDICINE

## 2017-07-20 PROCEDURE — 99212 OFFICE O/P EST SF 10 MIN: CPT

## 2017-07-20 PROCEDURE — 85025 COMPLETE CBC W/AUTO DIFF WBC: CPT | Performed by: INTERNAL MEDICINE

## 2017-07-20 PROCEDURE — 36591 DRAW BLOOD OFF VENOUS DEVICE: CPT

## 2017-07-20 PROCEDURE — 83735 ASSAY OF MAGNESIUM: CPT | Performed by: INTERNAL MEDICINE

## 2017-07-20 PROCEDURE — 80053 COMPREHEN METABOLIC PANEL: CPT | Performed by: INTERNAL MEDICINE

## 2017-07-20 PROCEDURE — 25000128 H RX IP 250 OP 636: Mod: ZF | Performed by: INTERNAL MEDICINE

## 2017-07-20 RX ORDER — HEPARIN SODIUM (PORCINE) LOCK FLUSH IV SOLN 100 UNIT/ML 100 UNIT/ML
5 SOLUTION INTRAVENOUS EVERY 8 HOURS
Status: DISCONTINUED | OUTPATIENT
Start: 2017-07-20 | End: 2017-08-10 | Stop reason: HOSPADM

## 2017-07-20 RX ADMIN — SODIUM CHLORIDE, PRESERVATIVE FREE 5 ML: 5 INJECTION INTRAVENOUS at 14:59

## 2017-07-20 ASSESSMENT — PAIN SCALES - GENERAL: PAINLEVEL: NO PAIN (0)

## 2017-07-20 NOTE — NURSING NOTE
Chief Complaint   Patient presents with     Port Draw     accessed for labs, heparin locked, vitals checked     Port left accessed in case labs are ordered

## 2017-07-20 NOTE — NURSING NOTE
"Oncology Rooming Note    July 20, 2017 3:32 PM   Cricket Chen is a 36 year old male who presents for:    Chief Complaint   Patient presents with     Port Draw     accessed for labs, heparin locked, vitals checked     RECHECK     Pt with  Post-transplant lymphoproliferative disorder --here for MD visit     Initial Vitals: /71  Pulse 69  Temp 98  F (36.7  C)  Resp 16  Wt 93.3 kg (205 lb 11.2 oz)  SpO2 99%  BMI 29.51 kg/m2 Estimated body mass index is 29.51 kg/(m^2) as calculated from the following:    Height as of 6/30/17: 1.778 m (5' 10\").    Weight as of this encounter: 93.3 kg (205 lb 11.2 oz). Body surface area is 2.15 meters squared.  No Pain (0) Comment: Data Unavailable   No LMP for male patient.  Allergies reviewed: Yes  Medications reviewed: Yes    Medications: Medication refills not needed today.  Pharmacy name entered into SIPX:    [facility] - [place], [place] - [address] [place] [address] [id number]  [facility] - [place], [place] - [address] [place] [address] AVE     Clinical concerns: none     6 minutes for nursing intake (face to face time)     Gabrielletimmy Brown MA              "

## 2017-07-20 NOTE — MR AVS SNAPSHOT
After Visit Summary   7/20/2017    Cricket Chen    MRN: [id number]           Patient Information     Date Of Birth          1980        Visit Information        Provider Department      7/20/2017 3:30 PM Girish Narayanan MD [facility] Blood and Marrow Transplant        Today's Diagnoses     Post-transplant lymphoproliferative disorder (H)    -  1          [facility] [place] [place]  [address] [place] [address]  Phone: [phone]  Clinic Hours:   Monday-Thursday:7am to 7pm   Friday: 7am to 5pm   Weekends and holidays:    8am to noon (in general)  If your fever is 100.5  or greater,   call the clinic.  After hours call the   hospital at [phone] or   [phone]. Ask for the BMT   fellow on-call            Follow-ups after your visit        Your next 10 appointments already scheduled     Aug 16, 2017  8:40 AM CDT   (Arrive by 8:25 AM)   Return Visit with Beka Soto MD   [facility] Ear Nose and Throat ([facility])    [address] [place] [address]   [phone]            Aug 31, 2017 11:30 AM CDT   [facility] Draw with  [facility]   [facility] Draw ([facility])    [address] [place] [id number]   [phone]            Oct 26, 2017  3:00 PM CDT   [facility] Draw with  [facility] DRAW   [facility] Draw ([facility])    [address] [place] [address]   [phone]            Oct 26, 2017  3:30 PM CDT   RETURN ONC with Girish Narayanan MD   [facility] Blood and Marrow Transplant ([facility])    [address] [place] [id number]   [phone]            Jan 16, 2018 12:10 PM CST   (Arrive by 11:55 AM)   Return Liver Transplant with Laureen Galvan MD   [facility] Hepatology ([facility])    [facility]  [address] [facility] [address] [phone]            Jan 16, 2018  2:20 PM CST   (Arrive by 1:50 PM)   Return Kidney Transplant with Jake Siduh MD   [facility] Nephrology ([facility] [place])    [address] [facility]  [address] [facility] [address] [phone]              Who to contact     If you have questions or need follow up information about today's clinic visit or your schedule please contact [facility]ARR[facility]PL[facility] directly at [phone].  Normal or non-critical lab and imaging results will be communicated to you by [employer], letter or phone within 4 business days after the clinic has received the results. If you do not hear from us within 7 days, please contact the clinic through [employer] or phone. If you have a critical or abnormal lab result, we will notify you by phone as soon as possible.  Submit refill requests through [employer] or call your pharmacy and they will forward the refill request to us. Please allow 3 business days for your refill to be completed.          Additional Information About Your Visit        MyChart Information     [employer] gives you secure access to your electronic health record. If you see a primary care provider, you can also send messages to your care team and make appointments. If you have questions, please call your primary care clinic.  If you do not have a primary care provider, please call [phone] and they will assist you.        Care EveryWhere ID     This is your Care EveryWhere ID. This could be used by other organizations to access your [place] medical records  [id number]        Your Vitals Were     Pulse Temperature Respirations Pulse Oximetry BMI (Body Mass Index)       69 98  F (36.7  C) 16 99% 29.51 kg/m2        Blood Pressure from Last 3 Encounters:   08/07/17 (!) 158/99   07/26/17 119/77   07/20/17 120/71    Weight from Last 3 Encounters:   08/07/17 92.4 kg (203 lb 9.9 oz)    07/20/17 93.3 kg (205 lb 11.2 oz)   06/30/17 92.1 kg (203 lb)              We Performed the Following     CBC with platelets differential     Comprehensive metabolic panel     Lactate Dehydrogenase     Magnesium - NOW          Today's Medication Changes          These changes are accurate as of: 7/20/17 11:59 PM.  If you have any questions, ask your nurse or doctor.               Stop taking these medicines if you haven't already. Please contact your care team if you have questions.     cyclobenzaprine 5 MG tablet   Commonly known as:  FLEXERIL   Stopped by:  Girish Narayanan MD           etoposide 50 MG capsule CHEMO   Stopped by:  Girish Narayanan MD           LORazepam 0.5 MG tablet   Commonly known as:  ATIVAN   Stopped by:  Girish Narayanan MD           prochlorperazine 10 MG tablet   Commonly known as:  COMPAZINE   Stopped by:  Girish Narayanan MD                    Recent Review Flowsheet Data     BMT Recent Results Latest Ref Rng & Units 5/17/2017 5/17/2017 6/7/2017 7/19/2017 7/20/2017 7/26/2017 8/7/2017    WBC 4.0 - 11.0 10e9/L 5.0 5.1 3.8(L) - 3.6(L) 5.2 3.2(L)    Hemoglobin 13.3 - 17.7 g/dL 12.5(L) 12.3(L) 12.6(L) - 14.0 16.0 13.5    Platelet Count 150 - 450 10e9/L 91(L) 93(L) 87(L) - 101(L) 84(L) 78(L)    Neutrophils (Absolute) 1.6 - 8.3 10e9/L 4.1 - 3.1 - 2.8 4.7 -    INR 0.86 - 1.14 - - - - - - -    Sodium 133 - 144 mmol/L 143 144 140 - 138 139 142    Potassium 3.4 - 5.3 mmol/L 3.4 3.6 3.8 - 4.4 4.2 3.6    Chloride 94 - 109 mmol/L 110(H) 110(H) 108 - 108 105 111(H)    Glucose 70 - 99 mg/dL 124(H) 128(H) 87 93 94 91 90    Urea Nitrogen 7 - 30 mg/dL 17 18 21 - 18 18 23    Creatinine 0.66 - 1.25 mg/dL 1.12 1.15 1.15 1.3(H) 1.26(H) 1.43(H) 1.22    Calcium (Total) 8.5 - 10.1 mg/dL 8.3(L) 8.8 8.9 - 9.0 9.9 9.2    Protein (Total) 6.8 - 8.8 g/dL 6.2(L) 6.5(L) 6.8 - 7.1 - -    Albumin 3.4 - 5.0 g/dL 3.6 3.7 3.9 - 4.5 - -    Bilirubin (Direct) 0.0 - 0.2 mg/dL <0.1 - - - - - -    Alkaline  Phosphatase 40 - 150 U/L 70 72 71 - 56 - -    AST 0 - 45 U/L 10 11 14 - 17 - -    ALT 0 - 70 U/L 20 21 21 - 23 - -    MCV 78 - 100 fl 94 95 95 - 93 94 90               Primary Care Provider    None Specified       No address on file        Equal Access to Services     CLAY VIDES : Hadii steffany jurado jaydon Sodarleneali, waaxda luqadaha, qaybta kaalmada adesusieyacindi, robina gladisin hayaanestor medinasusie vaughn laajaynestor maryjo. So [facility] [phone].    ATENCIÓN: Si habla español, tiene a laura disposición servicios gratuitos de asistencia lingüística. Llame al [phone].    We comply with applicable federal civil rights laws and [place] laws. We do not discriminate on the basis of race, color, national origin, age, disability sex, sexual orientation or gender identity.            Thank you!     Thank you for choosing [facility]  for your care. Our goal is always to provide you with excellent care. Hearing back from our patients is one way we can continue to improve our services. Please take a few minutes to complete the written survey that you may receive in the mail after your visit with us. Thank you!             Your Updated Medication List - Protect others around you: Learn how to safely use, store and throw away your medicines at www.disposemymeds.org.          This list is accurate as of: 7/20/17 11:59 PM.  Always use your most recent med list.                   Brand Name Dispense Instructions for use Diagnosis    amLODIPine 10 MG tablet    NORVASC    90 tablet    Take 1 tablet (10 mg) by mouth daily    HTN (hypertension)       calcitRIOL 0.25 MCG capsule    ROCALTROL    42 capsule    Take 1 capsule (0.25 mcg) by mouth 3 times daily    Papillary thyroid carcinoma (H)       calcium carbonate 500 MG chewable tablet    TUMS    42 tablet    Take 1 tablet (500 mg) by mouth 3 times daily    Papillary thyroid carcinoma (H)       levothyroxine 150 MCG tablet    SYNTHROID/LEVOTHROID    90 tablet    Take 1 tablet (150  mcg) by mouth daily    Papillary thyroid carcinoma (H)       lisinopril 5 MG tablet    PRINIVIL/ZESTRIL    90 tablet    Take 1 tablet (5 mg) by mouth daily    HTN (hypertension)       predniSONE 5 MG tablet    DELTASONE    90 tablet    Take 1 tablet (5 mg) by mouth daily    Liver replaced by transplant (H), S/P kidney transplant       sulfamethoxazole-trimethoprim 400-80 MG per tablet    BACTRIM/SEPTRA    30 tablet    Take 1 tablet by mouth daily    Liver replaced by transplant (H), S/P kidney transplant       tacrolimus 1 MG capsule    PROGRAF - GENERIC EQUIVALENT    120 capsule    Take 2 capsules (2 mg) by mouth 2 times daily    Liver replaced by transplant (H), S/P kidney transplant       traMADol 50 MG tablet    ULTRAM    45 tablet    Take 1 tablet (50 mg) by mouth every 8 hours as needed for moderate pain    Chronic pain of left knee, Papillary thyroid carcinoma (H)       traZODone 50 MG tablet    DESYREL    30 tablet    Take 0.5-2 tablets ( mg) by mouth At Bedtime    Other insomnia

## 2017-07-21 DIAGNOSIS — E83.42 HYPOMAGNESEMIA: ICD-10-CM

## 2017-07-21 ASSESSMENT — ACTIVITIES OF DAILY LIVING (ADL)
ARE_THERE_SMOKE_DETECTORS_IN_YOUR_HOME?: Y
DO_MEMBERS_OF_YOUR_HOUSEHOLD_USE_SUNSCREEN?: Y
DO_MEMBERS_OF_YOUR_HOUSEHOLD_USE_SAFETY_HELMETS?: N
DO_MEMBERS_OF_YOUR_HOUSEHOLD_WEAR_SEAT_BELTS?: Y
ARE_THERE_FIREARMS_IN_YOUR_HOME?: N
ARE_THERE_CARBON_MONOXIDE_DETECTORS_IN_YOUR_HOME?: Y

## 2017-07-26 ENCOUNTER — OFFICE VISIT (OUTPATIENT)
Dept: FAMILY MEDICINE | Facility: CLINIC | Age: 37
End: 2017-07-26

## 2017-07-26 VITALS — HEART RATE: 71 BPM | SYSTOLIC BLOOD PRESSURE: 119 MMHG | OXYGEN SATURATION: 100 % | DIASTOLIC BLOOD PRESSURE: 77 MMHG

## 2017-07-26 DIAGNOSIS — C73 PAPILLARY THYROID CARCINOMA (H): Primary | ICD-10-CM

## 2017-07-26 DIAGNOSIS — I10 BENIGN ESSENTIAL HYPERTENSION: ICD-10-CM

## 2017-07-26 DIAGNOSIS — D69.6 THROMBOCYTOPENIA (H): ICD-10-CM

## 2017-07-26 DIAGNOSIS — D47.Z1 POST-TRANSPLANT LYMPHOPROLIFERATIVE DISORDER (H): ICD-10-CM

## 2017-07-26 DIAGNOSIS — Z87.74 HISTORY OF TRANSPOSITION OF GREAT VESSELS: ICD-10-CM

## 2017-07-26 DIAGNOSIS — D84.9 IMMUNOSUPPRESSED STATUS (H): ICD-10-CM

## 2017-07-26 DIAGNOSIS — Z94.4 LIVER REPLACED BY TRANSPLANT (H): ICD-10-CM

## 2017-07-26 LAB
ANION GAP SERPL CALCULATED.3IONS-SCNC: 5 MMOL/L (ref 3–14)
BASOPHILS # BLD AUTO: 0 10E9/L (ref 0–0.2)
BASOPHILS NFR BLD AUTO: 0 %
BUN SERPL-MCNC: 18 MG/DL (ref 7–30)
CALCIUM SERPL-MCNC: 9.9 MG/DL (ref 8.5–10.1)
CHLORIDE SERPL-SCNC: 105 MMOL/L (ref 94–109)
CO2 SERPL-SCNC: 28 MMOL/L (ref 20–32)
CREAT SERPL-MCNC: 1.43 MG/DL (ref 0.66–1.25)
DIFFERENTIAL METHOD BLD: ABNORMAL
EOSINOPHIL # BLD AUTO: 0 10E9/L (ref 0–0.7)
EOSINOPHIL NFR BLD AUTO: 0 %
ERYTHROCYTE [DISTWIDTH] IN BLOOD BY AUTOMATED COUNT: 12 % (ref 10–15)
GFR SERPL CREATININE-BSD FRML MDRD: 56 ML/MIN/1.7M2
GLUCOSE SERPL-MCNC: 91 MG/DL (ref 70–99)
HCT VFR BLD AUTO: 48.3 % (ref 40–53)
HGB BLD-MCNC: 16 G/DL (ref 13.3–17.7)
LYMPHOCYTES # BLD AUTO: 0.3 10E9/L (ref 0.8–5.3)
LYMPHOCYTES NFR BLD AUTO: 6.1 %
MCH RBC QN AUTO: 31.2 PG (ref 26.5–33)
MCHC RBC AUTO-ENTMCNC: 33.1 G/DL (ref 31.5–36.5)
MCV RBC AUTO: 94 FL (ref 78–100)
METAMYELOCYTES # BLD: 0 10E9/L
METAMYELOCYTES NFR BLD MANUAL: 0.9 %
MONOCYTES # BLD AUTO: 0.1 10E9/L (ref 0–1.3)
MONOCYTES NFR BLD AUTO: 1.7 %
MYELOCYTES # BLD: 0 10E9/L
MYELOCYTES NFR BLD MANUAL: 0.9 %
NEUTROPHILS # BLD AUTO: 4.7 10E9/L (ref 1.6–8.3)
NEUTROPHILS NFR BLD AUTO: 90.4 %
PLATELET # BLD AUTO: 84 10E9/L (ref 150–450)
POTASSIUM SERPL-SCNC: 4.2 MMOL/L (ref 3.4–5.3)
RBC # BLD AUTO: 5.13 10E12/L (ref 4.4–5.9)
RBC MORPH BLD: NORMAL
SODIUM SERPL-SCNC: 139 MMOL/L (ref 133–144)
WBC # BLD AUTO: 5.2 10E9/L (ref 4–11)

## 2017-07-26 ASSESSMENT — PAIN SCALES - GENERAL: PAINLEVEL: NO PAIN (0)

## 2017-07-26 NOTE — PATIENT INSTRUCTIONS
[facility] Medication Refill Request Information:  * Please contact your pharmacy regarding ANY request for medication refills.  ** [facility] Prescription Fax = [phone]  * Please allow 3 business days for routine medication refills.  * Please allow 5 business days for controlled substance medication refills.     [facility] Test Result notification information:  *You will be notified with in 7-10 days of your appointment day regarding the results of your test.  If you are on MyChart you will be notified as soon as the provider has reviewed the results and signed off on them.    [facility] [phone]   GENERAL PREOP INSTRUCTIONS:  Proceed with surgery as planned.  No food or liquids the morning of surgery.  Call surgeon if develops respiratory illness, fever, or other illness.  Take the following medications the morning of surgery with a sip of water amylodipine and lisinopril   Letter sent to requesting surgeon listed above  Written preoperative instructions given.    Blood work today  We will try to get EKG from cardiology.

## 2017-07-26 NOTE — PROGRESS NOTES
Surgeon (please enter first and last name):  Dr. Beka Soto  Fax number for Preop Evaluation:    Location of Surgery: [facility]  Date of Surgery:  08/07/17  Procedure:  Thyroidectomy   History of reaction to anesthesia?  No    Cricket Chen is 36 year old male here at the request of Dr. Beka Soto for cardiovascular, pulmonary, and perioperative risk assessment prior to surgery.The intended surgical procedure is thyroidectomy.  A copy of this note will be sent to the surgeon.    Past Medical History:   Diagnosis Date     Alcohol abuse     Last drink in Mid-April 2014     Anemia in ESRD (end-stage renal disease) (H)      Anxiety 2008     Atrial flutter (H)      Cirrhosis (H)     S/P liver transplant     Depression      History of blood transfusion      History of transposition of great vessels     atrial switch at age 8 months old     Hypertension      Liver transplant recipient (H)     2016     Pneumonia 11-15-14     Renal transplant recipient     2016     Varices, esophageal (H)       PAST SURGICAL HISTORY  Current Outpatient Prescriptions   Medication     magnesium oxide (MAG-OX) 400 (241.3 MG) MG tablet     tacrolimus (PROGRAF - GENERIC EQUIVALENT) 1 MG capsule     predniSONE (DELTASONE) 5 MG tablet     sulfamethoxazole-trimethoprim (BACTRIM/SEPTRA) 400-80 MG per tablet     traZODone (DESYREL) 50 MG tablet     traMADol (ULTRAM) 50 MG tablet     Cholecalciferol (VITAMIN D) 2000 UNITS tablet     amLODIPine (NORVASC) 10 MG tablet     lisinopril (PRINIVIL,ZESTRIL) 5 MG tablet     No current facility-administered medications for this visit.      Facility-Administered Medications Ordered in Other Visits   Medication     heparin 100 UNIT/ML injection 5 mL     Immunization History   Administered Date(s) Administered     DPT 1980, 1980, 03/01/1981, 07/01/1982, 07/01/1986     Hepatitis B Immunity: Titer 10/14/2014     Historical mumps 07/01/1981     Influenza (IIV3) 10/13/2009, 03/04/2013, 11/15/2013, 10/02/2016      MMR 12/01/1981, 09/01/1989     Pneumococcal 23 valent 06/19/2014     Poliovirus, inactivated (IPV) 1980, 1980, 07/01/1982, 07/01/1986     Rubella 07/01/1981     TD (ADULT, 7+) 08/01/1995     TDAP Vaccine (Boostrix) 07/12/2016       This is a MODERATE risk surgery.      HPI:   Reason for surgery:  (must document 4+HPI factors for completion)  36 year-old post-transplant patient whom  in January 2017  had two large neck nodes consistent with post-transplant lymphoproliferative disorder, EBV positive excised from his neck.  He was then diagnosed with a new papillary carcinoma of the right thyroid by Dr. Gray  in March 2017.  First focus was on the lymphoma and he has recently completed his chemotherapy.  He now is here to have his thyroid tumor removed. He had an US of his neck  And no evidence of disease recurrence in the head/neck area.      Cardiovascular Risk:  This patient ambulates with assist. without chest pain. He ISable to climb a flight of stairs withoutchest pain.    The patient does not have chest pain Rest.    He does not have a history of known cardiac disease, prior MI, smoker, high cholesterol and positive family history.  He did have transposition of the great vessels and surgical correction at birth, - does have  HTN and on meds.  The patient does not have a history of stroke, and does not have a history of valvular disease.    Pulmonary Risk:  In terms of risk factors for pulmonary complications, the patient does not have a history of Asthma, Chronic Bronchitis, COPD and Emphysema    Perioperative Complications:  The patient does have a history of bleeding or clotting problems in the past. Hx of liver and kidney transplant and has hx of low platlets - not easy bruising, no gum bleeding,  Had platlet transfusion before transplant  But not in last 2 yrs. . The patient has not had complications from past surgeries.  The patient does not have a family history of any  anesthesia or surgical complications.      ROS:  Constitutional: no fevers, night sweats or unintentional weight change   Eyes: no vision change, diplopia or red eyes   Ears, Nose, Mouth, Throat: no tinnitus or hearing change, no epistaxis or nasal discharge, no oral lesions, throat clear   Cardiovascular: no chest pain, palpitations, or pain with walking, no orthopnea or PND   Respiratory: no dyspnea, cough, shortness of breath or wheezing   GI: no nausea, vomiting, diarrhea or constipation, no abdominal pain   : no change in urine, no dysuria or hematuria  Musculoskeletal: no joint or muscle pain or swelling   Integumentary: no concerning lesions or moles   Neuro: no loss of strength or sensation, no numbness or tingling, no tremor, no dizziness, no headache   Endo: no polyuria or polydipsia, no temperature intolerance   Heme/Lymph: no concerning bumps, no bleeding problems   Allergy: no environmental allergies   Psych: no depression or anxiety, no sleep problems      PHYSICAL EXAM:  /77  Pulse 71  SpO2 100%    Wt Readings from Last 1 Encounters:   07/20/17 93.3 kg (205 lb 11.2 oz)       Constitutional: no distress, comfortable, pleasant   Eyes: anicteric, normal extra-ocular movements   Ears, Nose and Throat: tympanic membranes clear, nose clear and free of lesions, throat clear, neck supple with full range of motion, no thyromegaly palpated. No gum bleeding.   Cardiovascular: regular rate and rhythm, normal S1 and S2, no murmurs, rubs or gallops, peripheral pulses full and symmetric   Respiratory: clear to auscultation, no wheezes or crackles, normal breath sounds   Gastrointestinal: positive bowel sounds, nontender, no hepatosplenomegaly, no masses   Musculoskeletal: good  range of motion, no edema   Skin: no bruising, no gum bleeding, multiple tattoos,  2 fistulas right arm and forearm  no concerning lesions, no jaundice   Neurological: cranial nerves intact, normal strength and sensation, reflexes  at patella and biceps normal, normal gait, no tremor   Psychological: appropriate mood   Lymphatic: no cervical, axillary or inguinal lymphadenopathy      A/P:    The patient with   Past Medical History:   Diagnosis Date     Alcohol abuse     Last drink in Mid-April 2014     Anemia in ESRD (end-stage renal disease) (H)      Anxiety 2008     Atrial flutter (H)      Cirrhosis (H)     S/P liver transplant     Depression      History of blood transfusion      History of transposition of great vessels     atrial switch at age 8 months old     Hypertension      Liver transplant recipient (H)     2016     Pneumonia 11-15-14     Renal transplant recipient     2016     Varices, esophageal (H)     presents prior to surgery for assessment of perioperative risk. The patient is at LOWrisk for cardiovascular complications and at LOWrisk for pulmonary complications of this MODERATErisk surgery.    --Approval given to proceed with proposed procedure, but needs platelet count AM of surgery and pre-evaluation by his cardiologist. He just saw the cardiologist in April     ASA class 2 - Mild systemic disease  No evidence of functionally compromising cardiac or pulmonary status  The patient is recommended to hold aspirin or NSAIDS for 10 days prior to surgery.  The patient is instructed as to which medications to take with sips of water the morning of surgery    GENERAL PREOP INSTRUCTIONS:  Proceed with surgery as planned.  No food or liquids the morning of surgery.  Call surgeon if develops respiratory illness, fever, or other illness.  Take the following medications the morning of surgery with a sip of water amylodipine and lisinopril   Letter sent to requesting surgeon listed above  Written preoperative instructions given.      Laboratory studies:  CBC and BMP  Results for orders placed or performed in visit on 07/26/17   Basic metabolic panel   Result Value Ref Range    Sodium 139 133 - 144 mmol/L    Potassium 4.2 3.4 - 5.3 mmol/L     Chloride 105 94 - 109 mmol/L    Carbon Dioxide 28 20 - 32 mmol/L    Anion Gap 5 3 - 14 mmol/L    Glucose 91 70 - 99 mg/dL    Urea Nitrogen 18 7 - 30 mg/dL    Creatinine 1.43 (H) 0.66 - 1.25 mg/dL    GFR Estimate 56 (L) >60 mL/min/1.7m2    GFR Estimate If Black 67 >60 mL/min/1.7m2    Calcium 9.9 8.5 - 10.1 mg/dL     Component Value Flag Ref Range Units Status Collected Lab   WBC 5.2  4.0 - 11.0 10e9/L Final 07/26/2017  1:51 PM 1740   RBC Count 5.13  4.4 - 5.9 10e12/L Final 07/26/2017  1:51 PM 1740   Hemoglobin 16.0  13.3 - 17.7 g/dL Final 07/26/2017  1:51 PM 1740   Hematocrit 48.3  40.0 - 53.0 % Final 07/26/2017  1:51 PM 1740   MCV 94  78 - 100 fl Final 07/26/2017  1:51 PM 1740   MCH 31.2  26.5 - 33.0 pg Final 07/26/2017  1:51 PM 1740   MCHC 33.1  31.5 - 36.5 g/dL Final 07/26/2017  1:51 PM 1740   RDW 12.0  10.0 - 15.0 % Final 07/26/2017  1:51 PM 1740   Platelet Count 84 (L) 150 - 450 10e9/L Final 07/26/2017  1:51 PM 1740   Diff Method     Final 07/26/2017  1:51 PM 1740   Manual Differential   % Neutrophils 90.4   % Final 07/26/2017  1:51 PM 1740   % Lymphocytes 6.1   % Final 07/26/2017  1:51 PM 1740   % Monocytes 1.7   % Final 07/26/2017  1:51 PM 1740   % Eosinophils 0.0   % Final 07/26/2017  1:51 PM 1740   % Basophils 0.0   % Final 07/26/2017  1:51 PM 1740   % Metamyelocytes 0.9   % Final 07/26/2017  1:51 PM 1740   % Myelocytes 0.9   % Final 07/26/2017  1:51 PM 1740   Absolute Neutrophil 4.7  1.6 - 8.3 10e9/L Final 07/26/2017  1:51 PM 1740   Absolute Lymphocytes 0.3 (L) 0.8 - 5.3 10e9/L Final 07/26/2017  1:51 PM 1740   Absolute Monocytes 0.1  0.0 - 1.3 10e9/L Final 07/26/2017  1:51 PM 1740   Absolute Eosinophils 0.0  0.0 - 0.7 10e9/L Final 07/26/2017  1:51 PM 1740   Absolute Basophils 0.0  0.0 - 0.2 10e9/L Final 07/26/2017  1:51 PM 1740   Absolute Metamyelocytes 0.0  0 10e9/L Final 07/26/2017  1:51 PM 1740   Absolute Myelocytes 0.0  0 10e9/L Final 07/26/2017  1:51 PM 1740   RBC Morphology Normal    Final  07/26/2017  1:51 PM      Platelets on 7/20/17 were 101   Platelets are fluctuating - no obvious bleeding would recheck platelets on day of surgery.  Pregnancy testing was not indicated.    Cardiovascular: EKG was 5/20/2016 and he is followed regularly by a cardiologist at [facility]  And just seen in 4/2017 and had an EKG which showed previous changes - please contact his cardiologist for preop approval  - Dr Erasmo Calvo at [facility].     Please contact our office if there are any further questions or information required about this patient.    Rachel Peres

## 2017-07-26 NOTE — NURSING NOTE
Chief Complaint   Patient presents with     Pre-Op Exam     Patient here for pre op exam.       Rosalie Dunbar CMA at 12:38 PM on 7/26/2017.

## 2017-07-26 NOTE — LETTER
7/26/2017      RE: Cricket Chen  [address]  [place] [address]       Surgeon (please enter first and last name):  Dr. Beka Soto  Fax number for Preop Evaluation:    Location of Surgery: [facility]  Date of Surgery:  08/07/17  Procedure:  Thyroidectomy   History of reaction to anesthesia?  No    Cricket Chen is 36 year old male here at the request of Dr. Beka Soto for cardiovascular, pulmonary, and perioperative risk assessment prior to surgery.The intended surgical procedure is thyroidectomy.  A copy of this note will be sent to the surgeon.    Past Medical History:   Diagnosis Date     Alcohol abuse     Last drink in Mid-April 2014     Anemia in ESRD (end-stage renal disease) (H)      Anxiety 2008     Atrial flutter (H)      Cirrhosis (H)     S/P liver transplant     Depression      History of blood transfusion      History of transposition of great vessels     atrial switch at age 8 months old     Hypertension      Liver transplant recipient (H)     2016     Pneumonia 11-15-14     Renal transplant recipient     2016     Varices, esophageal (H)       PAST SURGICAL HISTORY  Current Outpatient Prescriptions   Medication     magnesium oxide (MAG-OX) 400 (241.3 MG) MG tablet     tacrolimus (PROGRAF - GENERIC EQUIVALENT) 1 MG capsule     predniSONE (DELTASONE) 5 MG tablet     sulfamethoxazole-trimethoprim (BACTRIM/SEPTRA) 400-80 MG per tablet     traZODone (DESYREL) 50 MG tablet     traMADol (ULTRAM) 50 MG tablet     Cholecalciferol (VITAMIN D) 2000 UNITS tablet     amLODIPine (NORVASC) 10 MG tablet     lisinopril (PRINIVIL,ZESTRIL) 5 MG tablet     No current facility-administered medications for this visit.      Facility-Administered Medications Ordered in Other Visits   Medication     heparin 100 UNIT/ML injection 5 mL     Immunization History   Administered Date(s) Administered     DPT 1980, 1980, 03/01/1981, 07/01/1982, 07/01/1986     Hepatitis B Immunity: Titer 10/14/2014     Historical  mumps 07/01/1981     Influenza (IIV3) 10/13/2009, 03/04/2013, 11/15/2013, 10/02/2016     MMR 12/01/1981, 09/01/1989     Pneumococcal 23 valent 06/19/2014     Poliovirus, inactivated (IPV) 1980, 1980, 07/01/1982, 07/01/1986     Rubella 07/01/1981     TD (ADULT, 7+) 08/01/1995     TDAP Vaccine (Boostrix) 07/12/2016       This is a MODERATE risk surgery.      HPI:   Reason for surgery:  (must document 4+HPI factors for completion)  36 year-old post-transplant patient whom  in January 2017  had two large neck nodes consistent with post-transplant lymphoproliferative disorder, EBV positive excised from his neck.  He was then diagnosed with a new papillary carcinoma of the right thyroid by Dr. Gray  in March 2017.  First focus was on the lymphoma and he has recently completed his chemotherapy.  SHANIA mathur now is here to have his thyroid tumor removed. He had an US of his neck  And no evidence of disease recurrence in the head/neck area.      Cardiovascular Risk:  This patient ambulates with assist. without chest pain. He ISable to climb a flight of stairs withoutchest pain.    The patient does not have chest pain Rest.    He does not have a history of known cardiac disease, prior MI, smoker, high cholesterol and positive family history.  He did have transposition of the great vessels and surgical correction at birth, - does have  HTN and on meds.  The patient does not have a history of stroke, and does not have a history of valvular disease.    Pulmonary Risk:  In terms of risk factors for pulmonary complications, the patient does not have a history of Asthma, Chronic Bronchitis, COPD and Emphysema    Perioperative Complications:  The patient does have a history of bleeding or clotting problems in the past. Hx of liver and kidney transplant and has hx of low platlets - not easy bruising, no gum bleeding,  Had platlet transfusion before transplant  But not in last 2 yrs. . The patient has not had  complications from past surgeries.  The patient does not have a family history of any anesthesia or surgical complications.      ROS:  Constitutional: no fevers, night sweats or unintentional weight change   Eyes: no vision change, diplopia or red eyes   Ears, Nose, Mouth, Throat: no tinnitus or hearing change, no epistaxis or nasal discharge, no oral lesions, throat clear   Cardiovascular: no chest pain, palpitations, or pain with walking, no orthopnea or PND   Respiratory: no dyspnea, cough, shortness of breath or wheezing   GI: no nausea, vomiting, diarrhea or constipation, no abdominal pain   : no change in urine, no dysuria or hematuria  Musculoskeletal: no joint or muscle pain or swelling   Integumentary: no concerning lesions or moles   Neuro: no loss of strength or sensation, no numbness or tingling, no tremor, no dizziness, no headache   Endo: no polyuria or polydipsia, no temperature intolerance   Heme/Lymph: no concerning bumps, no bleeding problems   Allergy: no environmental allergies   Psych: no depression or anxiety, no sleep problems      PHYSICAL EXAM:  /77  Pulse 71  SpO2 100%    Wt Readings from Last 1 Encounters:   07/20/17 93.3 kg (205 lb 11.2 oz)       Constitutional: no distress, comfortable, pleasant   Eyes: anicteric, normal extra-ocular movements   Ears, Nose and Throat: tympanic membranes clear, nose clear and free of lesions, throat clear, neck supple with full range of motion, no thyromegaly palpated. No gum bleeding.   Cardiovascular: regular rate and rhythm, normal S1 and S2, no murmurs, rubs or gallops, peripheral pulses full and symmetric   Respiratory: clear to auscultation, no wheezes or crackles, normal breath sounds   Gastrointestinal: positive bowel sounds, nontender, no hepatosplenomegaly, no masses   Musculoskeletal: good  range of motion, no edema   Skin: no bruising, no gum bleeding, multiple tattoos,  2 fistulas right arm and forearm  no concerning lesions, no  jaundice   Neurological: cranial nerves intact, normal strength and sensation, reflexes at patella and biceps normal, normal gait, no tremor   Psychological: appropriate mood   Lymphatic: no cervical, axillary or inguinal lymphadenopathy      A/P:    The patient with   Past Medical History:   Diagnosis Date     Alcohol abuse     Last drink in Mid-April 2014     Anemia in ESRD (end-stage renal disease) (H)      Anxiety 2008     Atrial flutter (H)      Cirrhosis (H)     S/P liver transplant     Depression      History of blood transfusion      History of transposition of great vessels     atrial switch at age 8 months old     Hypertension      Liver transplant recipient (H)     2016     Pneumonia 11-15-14     Renal transplant recipient     2016     Varices, esophageal (H)     presents prior to surgery for assessment of perioperative risk. The patient is at LOWrisk for cardiovascular complications and at LOWrisk for pulmonary complications of this MODERATErisk surgery.    --Approval given to proceed with proposed procedure, but needs platelet count AM of surgery and pre-evaluation by his cardiologist. He just saw the cardiologist in April     ASA class 2 - Mild systemic disease  No evidence of functionally compromising cardiac or pulmonary status  The patient is recommended to hold aspirin or NSAIDS for 10 days prior to surgery.  The patient is instructed as to which medications to take with sips of water the morning of surgery    GENERAL PREOP INSTRUCTIONS:  Proceed with surgery as planned.  No food or liquids the morning of surgery.  Call surgeon if develops respiratory illness, fever, or other illness.  Take the following medications the morning of surgery with a sip of water amylodipine and lisinopril   Letter sent to requesting surgeon listed above  Written preoperative instructions given.      Laboratory studies:  CBC and BMP  Results for orders placed or performed in visit on 07/26/17   Basic metabolic panel    Result Value Ref Range    Sodium 139 133 - 144 mmol/L    Potassium 4.2 3.4 - 5.3 mmol/L    Chloride 105 94 - 109 mmol/L    Carbon Dioxide 28 20 - 32 mmol/L    Anion Gap 5 3 - 14 mmol/L    Glucose 91 70 - 99 mg/dL    Urea Nitrogen 18 7 - 30 mg/dL    Creatinine 1.43 (H) 0.66 - 1.25 mg/dL    GFR Estimate 56 (L) >60 mL/min/1.7m2    GFR Estimate If Black 67 >60 mL/min/1.7m2    Calcium 9.9 8.5 - 10.1 mg/dL     Component Value Flag Ref Range Units Status Collected Lab   WBC 5.2  4.0 - 11.0 10e9/L Final 07/26/2017  1:51 PM 1740   RBC Count 5.13  4.4 - 5.9 10e12/L Final 07/26/2017  1:51 PM 1740   Hemoglobin 16.0  13.3 - 17.7 g/dL Final 07/26/2017  1:51 PM 1740   Hematocrit 48.3  40.0 - 53.0 % Final 07/26/2017  1:51 PM 1740   MCV 94  78 - 100 fl Final 07/26/2017  1:51 PM 1740   MCH 31.2  26.5 - 33.0 pg Final 07/26/2017  1:51 PM 1740   MCHC 33.1  31.5 - 36.5 g/dL Final 07/26/2017  1:51 PM 1740   RDW 12.0  10.0 - 15.0 % Final 07/26/2017  1:51 PM 1740   Platelet Count 84 (L) 150 - 450 10e9/L Final 07/26/2017  1:51 PM 1740   Diff Method     Final 07/26/2017  1:51 PM 1740   Manual Differential   % Neutrophils 90.4   % Final 07/26/2017  1:51 PM 1740   % Lymphocytes 6.1   % Final 07/26/2017  1:51 PM 1740   % Monocytes 1.7   % Final 07/26/2017  1:51 PM 1740   % Eosinophils 0.0   % Final 07/26/2017  1:51 PM 1740   % Basophils 0.0   % Final 07/26/2017  1:51 PM 1740   % Metamyelocytes 0.9   % Final 07/26/2017  1:51 PM 1740   % Myelocytes 0.9   % Final 07/26/2017  1:51 PM 1740   Absolute Neutrophil 4.7  1.6 - 8.3 10e9/L Final 07/26/2017  1:51 PM 1740   Absolute Lymphocytes 0.3 (L) 0.8 - 5.3 10e9/L Final 07/26/2017  1:51 PM 1740   Absolute Monocytes 0.1  0.0 - 1.3 10e9/L Final 07/26/2017  1:51 PM 1740   Absolute Eosinophils 0.0  0.0 - 0.7 10e9/L Final 07/26/2017  1:51 PM 1740   Absolute Basophils 0.0  0.0 - 0.2 10e9/L Final 07/26/2017  1:51 PM 1740   Absolute Metamyelocytes 0.0  0 10e9/L Final 07/26/2017  1:51 PM 1740   Absolute  Myelocytes 0.0  0 10e9/L Final 07/26/2017  1:51 PM 1740   RBC Morphology Normal    Final 07/26/2017  1:51 PM      Platelets on 7/20/17 were 101   Platelets are fluctuating - no obvious bleeding would recheck platelets on day of surgery.  Pregnancy testing was not indicated.    Cardiovascular: EKG was 5/20/2016 and he is followed regularly by a cardiologist at [facility]  And just seen in 4/2017 and had an EKG which showed previous changes - please contact his cardiologist for preop approval  - Dr Erasmo Calvo at [place].     Please contact our office if there are any further questions or information required about this patient.    Rachel Peres MD PhD

## 2017-07-27 ENCOUNTER — TELEPHONE (OUTPATIENT)
Dept: INTERNAL MEDICINE | Facility: CLINIC | Age: 37
End: 2017-07-27

## 2017-07-27 ENCOUNTER — MEDICAL CORRESPONDENCE (OUTPATIENT)
Dept: HEALTH INFORMATION MANAGEMENT | Facility: CLINIC | Age: 37
End: 2017-07-27

## 2017-07-27 NOTE — TELEPHONE ENCOUNTER
Spoke with pt, stated that the cardiologist faxed the report to .  Linda Reyes RN  ------------      ----- Message from Wendie Curran sent at 7/27/2017 11:52 AM CDT -----  Regarding: Okay for surgery per Cardiologist  Contact: [phone]  Patient called back, he spoke to his cardiologist and his cardiologist gave him the okay for the surgery. He would like you to call him back to see if his verbal okay is enough. I gave him the fax number to the clinic in case he needs a note from his cardiologist saying he is cleared for surgery.     Thank you!  Danyelle    Call Center       Please DO NOT send this message and/or reply back to sender. Call Center Representatives DO NOT respond to messages.

## 2017-07-27 NOTE — TELEPHONE ENCOUNTER
Call to pt, message given to pt, verbalize understanding.  Linda Reyes RN  -------------------------      ----- Message from Rachel Peres MD PhD sent at 7/26/2017  3:49 PM CDT -----  Regarding:  - cardiology   Linda  I did a preop on him today - he has cardiac hx with a cardiologist at [facility] - Dr Erasmo Calvo  Who should give his OK for this upcoming surgery -  On August 8th    I put that in the preop but someone should f/u on this - he just saw the cardiologist 4/2017 but we should get a note that he is OK for this surgery  Thanks]  Rachel

## 2017-08-02 DIAGNOSIS — Z94.4 LIVER REPLACED BY TRANSPLANT (H): ICD-10-CM

## 2017-08-02 DIAGNOSIS — Z94.0 S/P KIDNEY TRANSPLANT: ICD-10-CM

## 2017-08-02 PROCEDURE — 36415 COLL VENOUS BLD VENIPUNCTURE: CPT | Performed by: INTERNAL MEDICINE

## 2017-08-02 PROCEDURE — 80197 ASSAY OF TACROLIMUS: CPT | Performed by: INTERNAL MEDICINE

## 2017-08-03 LAB
TACROLIMUS BLD-MCNC: 4.3 UG/L (ref 5–15)
TME LAST DOSE: ABNORMAL H

## 2017-08-06 ENCOUNTER — ANESTHESIA EVENT (OUTPATIENT)
Dept: SURGERY | Facility: CLINIC | Age: 37
End: 2017-08-06
Payer: COMMERCIAL

## 2017-08-06 ASSESSMENT — ENCOUNTER SYMPTOMS: DYSRHYTHMIAS: 1

## 2017-08-06 NOTE — ANESTHESIA PREPROCEDURE EVALUATION
Anesthesia Evaluation     .             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 .    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 ECG 5/10/16: Sinus rhythm with sinus arrhythmia; RBBB morphology, RVH, left posterior fascicular block.    (+) hypertension----. : . . . :. dysrhythmias a-flutter, . 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:,       METS/Exercise Tolerance:  >4 METS   Hematologic:     (+) History of Transfusion no previous transfusion reaction Other Hematologic Disorder-Thrombocytopenia with a history of platelet transfusion and no history of abnormal bleeding      Musculoskeletal:  - neg musculoskeletal ROS       GI/Hepatic:     (+) liver disease, Other GI/Hepatic Alcoholic cirrhosis c/b hepatorenal syndrome requiring liver and renal transplant 5/16      Renal/Genitourinary: Comment: History alcoholic cirrhosis c/b chronic kidney disease due to hepatorenal syndrome requiring HD, portal hypertension, esophageal varices, and thrombocytopenia, S/P renal and liver transplants on 5/11/16     (+) chronic renal disease, type: ESRD, Pt does not require dialysis, Pt has history of transplant,       Endo:     (+) thyroid problem  Thyroid disease - Other Papillary thyroid carcinoma , .      Psychiatric:     (+) psychiatric history depression      Infectious Disease:  - neg infectious disease ROS       Malignancy:   (+) Malignancy History of Other  Lymph CA Remission status post, Other CA Post transplant  lymphorpoliferative disorder s/p excision of 2 cervical lymph nodes and chemotherapy status post         Other:    (+) No chance of pregnancy C-spine cleared: Yes, H/O Chronic Pain,H/O chronic opiod use , no other significant disability                    Physical Exam  Normal systems: dental    Airway   Mallampati: I  TM distance: >3 FB  Neck ROM: full    Dental     Cardiovascular   Rhythm and rate: regular and normal      Pulmonary    breath sounds clear to auscultation                    Anesthesia Plan      History & Physical Review  History and physical reviewed and following examination; no interval change.    ASA Status:  3 .    NPO Status:  > 8 hours (3h for H20)    Plan for General and ETT with Intravenous induction. Maintenance will be Balanced.    PONV prophylaxis:  Ondansetron (or other 5HT-3)  Additional equipment: 2nd IV ANESTHESIA PREOP EVALUATION    PROCEDURE: Total thyroidectomy for papillary thyroid carcinoma     SUMMARY: Cricket Chen is a 36 year old male with a history of alcoholic cirrhosis c/b hepatorenal syndrome requiring liver and kidney transplant 5/16 and previous post transplant lymphoproliferative disorder s/p LN neck resection x 2 1/17 and chemotherapy who presents for the above procedure.    ASSESSMENT & PLAN:  - ASA 3  - GETA with standard ASA monitors, IV induction, and balanced anesthetic  - 2 PIVs  - Antibiotics per surgery  - PONV prophylaxis  - Blood products available, possible administration discussed with patient  - Relevant risks, benefits, alternatives and the anesthetic plan was discussed.  All questions were answered and there was agreement to proceed.    Given chronic steroid use, will treat with hydrocortisone.  Recent visit to cardiologist who felt pt optimized for surgery.  No chest pain, shortness of breath, or new symptoms attributable to cardiac disease.  I discussed the risks and benefits of general anesthesiaMeghan with the patient.  Questions were sought and  answered.      Brandan Gant MD  Attending Anesthesiologist          Postoperative Care  Postoperative pain management:  Multi-modal analgesia, IV analgesics and Oral pain medications.      Consents  Anesthetic plan, risks, benefits and alternatives discussed with:  Patient..                          .

## 2017-08-07 ENCOUNTER — SURGERY (OUTPATIENT)
Age: 37
End: 2017-08-07

## 2017-08-07 ENCOUNTER — HOSPITAL ENCOUNTER (OUTPATIENT)
Facility: CLINIC | Age: 37
Discharge: HOME | End: 2017-08-07
Attending: OTOLARYNGOLOGY | Admitting: OTOLARYNGOLOGY
Payer: COMMERCIAL

## 2017-08-07 ENCOUNTER — ANESTHESIA (OUTPATIENT)
Dept: SURGERY | Facility: CLINIC | Age: 37
End: 2017-08-07
Payer: COMMERCIAL

## 2017-08-07 VITALS
HEIGHT: 70 IN | BODY MASS INDEX: 29.15 KG/M2 | TEMPERATURE: 98.3 F | HEART RATE: 66 BPM | OXYGEN SATURATION: 96 % | DIASTOLIC BLOOD PRESSURE: 99 MMHG | WEIGHT: 203.62 LBS | SYSTOLIC BLOOD PRESSURE: 158 MMHG | RESPIRATION RATE: 16 BRPM

## 2017-08-07 DIAGNOSIS — C73 PAPILLARY THYROID CARCINOMA (H): Primary | ICD-10-CM

## 2017-08-07 DIAGNOSIS — Z94.4 LIVER REPLACED BY TRANSPLANT (H): Primary | ICD-10-CM

## 2017-08-07 DIAGNOSIS — M25.562 CHRONIC PAIN OF LEFT KNEE: ICD-10-CM

## 2017-08-07 DIAGNOSIS — G89.29 CHRONIC PAIN OF LEFT KNEE: ICD-10-CM

## 2017-08-07 LAB
ABO + RH BLD: NORMAL
ABO + RH BLD: NORMAL
ANION GAP SERPL CALCULATED.3IONS-SCNC: 10 MMOL/L (ref 3–14)
BLD GP AB SCN SERPL QL: NORMAL
BLOOD BANK CMNT PATIENT-IMP: NORMAL
BUN SERPL-MCNC: 23 MG/DL (ref 7–30)
CALCIUM SERPL-MCNC: 9.2 MG/DL (ref 8.5–10.1)
CHLORIDE SERPL-SCNC: 111 MMOL/L (ref 94–109)
CO2 SERPL-SCNC: 22 MMOL/L (ref 20–32)
CREAT SERPL-MCNC: 1.22 MG/DL (ref 0.66–1.25)
ERYTHROCYTE [DISTWIDTH] IN BLOOD BY AUTOMATED COUNT: 12.3 % (ref 10–15)
GFR SERPL CREATININE-BSD FRML MDRD: 67 ML/MIN/1.7M2
GLUCOSE SERPL-MCNC: 90 MG/DL (ref 70–99)
HCT VFR BLD AUTO: 39 % (ref 40–53)
HGB BLD-MCNC: 13.5 G/DL (ref 13.3–17.7)
MCH RBC QN AUTO: 31.3 PG (ref 26.5–33)
MCHC RBC AUTO-ENTMCNC: 34.6 G/DL (ref 31.5–36.5)
MCV RBC AUTO: 90 FL (ref 78–100)
PLATELET # BLD AUTO: 78 10E9/L (ref 150–450)
POTASSIUM SERPL-SCNC: 3.6 MMOL/L (ref 3.4–5.3)
PTH-INTACT SERPL-MCNC: 109 PG/ML (ref 12–72)
PTH-INTACT SERPL-MCNC: 167 PG/ML (ref 12–72)
RBC # BLD AUTO: 4.32 10E12/L (ref 4.4–5.9)
SODIUM SERPL-SCNC: 142 MMOL/L (ref 133–144)
SPECIMEN EXP DATE BLD: NORMAL
WBC # BLD AUTO: 3.2 10E9/L (ref 4–11)

## 2017-08-07 PROCEDURE — 85027 COMPLETE CBC AUTOMATED: CPT | Performed by: ANESTHESIOLOGY

## 2017-08-07 PROCEDURE — 86850 RBC ANTIBODY SCREEN: CPT | Performed by: ANESTHESIOLOGY

## 2017-08-07 PROCEDURE — 36415 COLL VENOUS BLD VENIPUNCTURE: CPT | Performed by: STUDENT IN AN ORGANIZED HEALTH CARE EDUCATION/TRAINING PROGRAM

## 2017-08-07 PROCEDURE — 83970 ASSAY OF PARATHORMONE: CPT | Performed by: STUDENT IN AN ORGANIZED HEALTH CARE EDUCATION/TRAINING PROGRAM

## 2017-08-07 PROCEDURE — 25000566 ZZH SEVOFLURANE, EA 15 MIN: Performed by: OTOLARYNGOLOGY

## 2017-08-07 PROCEDURE — 88307 TISSUE EXAM BY PATHOLOGIST: CPT | Performed by: OTOLARYNGOLOGY

## 2017-08-07 PROCEDURE — 25000125 ZZHC RX 250: Performed by: STUDENT IN AN ORGANIZED HEALTH CARE EDUCATION/TRAINING PROGRAM

## 2017-08-07 PROCEDURE — 71000015 ZZH RECOVERY PHASE 1 LEVEL 2 EA ADDTL HR: Performed by: OTOLARYNGOLOGY

## 2017-08-07 PROCEDURE — 80048 BASIC METABOLIC PNL TOTAL CA: CPT | Performed by: ANESTHESIOLOGY

## 2017-08-07 PROCEDURE — 71000014 ZZH RECOVERY PHASE 1 LEVEL 2 FIRST HR: Performed by: OTOLARYNGOLOGY

## 2017-08-07 PROCEDURE — 86900 BLOOD TYPING SEROLOGIC ABO: CPT | Performed by: ANESTHESIOLOGY

## 2017-08-07 PROCEDURE — 25000128 H RX IP 250 OP 636: Performed by: STUDENT IN AN ORGANIZED HEALTH CARE EDUCATION/TRAINING PROGRAM

## 2017-08-07 PROCEDURE — 25000128 H RX IP 250 OP 636

## 2017-08-07 PROCEDURE — 40000275 ZZH STATISTIC RCP TIME EA 10 MIN

## 2017-08-07 PROCEDURE — 37000008 ZZH ANESTHESIA TECHNICAL FEE, 1ST 30 MIN: Performed by: OTOLARYNGOLOGY

## 2017-08-07 PROCEDURE — 36000064 ZZH SURGERY LEVEL 4 EA 15 ADDTL MIN - UMMC: Performed by: OTOLARYNGOLOGY

## 2017-08-07 PROCEDURE — 25000125 ZZHC RX 250

## 2017-08-07 PROCEDURE — 25000128 H RX IP 250 OP 636: Performed by: OTOLARYNGOLOGY

## 2017-08-07 PROCEDURE — 25000128 H RX IP 250 OP 636: Performed by: ANESTHESIOLOGY

## 2017-08-07 PROCEDURE — 27210794 ZZH OR GENERAL SUPPLY STERILE: Performed by: OTOLARYNGOLOGY

## 2017-08-07 PROCEDURE — 40000014 ZZH STATISTIC ARTERIAL MONITORING DAILY

## 2017-08-07 PROCEDURE — 25000125 ZZHC RX 250: Performed by: OTOLARYNGOLOGY

## 2017-08-07 PROCEDURE — 40000170 ZZH STATISTIC PRE-PROCEDURE ASSESSMENT II: Performed by: OTOLARYNGOLOGY

## 2017-08-07 PROCEDURE — 86901 BLOOD TYPING SEROLOGIC RH(D): CPT | Performed by: ANESTHESIOLOGY

## 2017-08-07 PROCEDURE — 37000009 ZZH ANESTHESIA TECHNICAL FEE, EACH ADDTL 15 MIN: Performed by: OTOLARYNGOLOGY

## 2017-08-07 PROCEDURE — 71000027 ZZH RECOVERY PHASE 2 EACH 15 MINS: Performed by: OTOLARYNGOLOGY

## 2017-08-07 PROCEDURE — 36000062 ZZH SURGERY LEVEL 4 1ST 30 MIN - UMMC: Performed by: OTOLARYNGOLOGY

## 2017-08-07 RX ORDER — DEXAMETHASONE SODIUM PHOSPHATE 4 MG/ML
INJECTION, SOLUTION INTRA-ARTICULAR; INTRALESIONAL; INTRAMUSCULAR; INTRAVENOUS; SOFT TISSUE PRN
Status: DISCONTINUED | OUTPATIENT
Start: 2017-08-07 | End: 2017-08-07

## 2017-08-07 RX ORDER — SODIUM CHLORIDE, SODIUM LACTATE, POTASSIUM CHLORIDE, CALCIUM CHLORIDE 600; 310; 30; 20 MG/100ML; MG/100ML; MG/100ML; MG/100ML
INJECTION, SOLUTION INTRAVENOUS CONTINUOUS
Status: DISCONTINUED | OUTPATIENT
Start: 2017-08-07 | End: 2017-08-07 | Stop reason: HOSPADM

## 2017-08-07 RX ORDER — LIDOCAINE HYDROCHLORIDE AND EPINEPHRINE 10; 10 MG/ML; UG/ML
INJECTION, SOLUTION INFILTRATION; PERINEURAL PRN
Status: DISCONTINUED | OUTPATIENT
Start: 2017-08-07 | End: 2017-08-07 | Stop reason: HOSPADM

## 2017-08-07 RX ORDER — ONDANSETRON 2 MG/ML
4 INJECTION INTRAMUSCULAR; INTRAVENOUS EVERY 30 MIN PRN
Status: DISCONTINUED | OUTPATIENT
Start: 2017-08-07 | End: 2017-08-07 | Stop reason: HOSPADM

## 2017-08-07 RX ORDER — PREDNISONE 5 MG/1
5 TABLET ORAL DAILY
Status: DISCONTINUED | OUTPATIENT
Start: 2017-08-07 | End: 2017-08-07 | Stop reason: CLARIF

## 2017-08-07 RX ORDER — CEFAZOLIN SODIUM 2 G/100ML
2 INJECTION, SOLUTION INTRAVENOUS
Status: COMPLETED | OUTPATIENT
Start: 2017-08-07 | End: 2017-08-07

## 2017-08-07 RX ORDER — LIDOCAINE 40 MG/G
CREAM TOPICAL
Status: DISCONTINUED | OUTPATIENT
Start: 2017-08-07 | End: 2017-08-07 | Stop reason: HOSPADM

## 2017-08-07 RX ORDER — CALCIUM CARBONATE 500 MG/1
1 TABLET, CHEWABLE ORAL 3 TIMES DAILY
Qty: 150 TABLET | Refills: 1 | Status: SHIPPED | OUTPATIENT
Start: 2017-08-07 | End: 2017-08-07

## 2017-08-07 RX ORDER — FENTANYL CITRATE 50 UG/ML
INJECTION, SOLUTION INTRAMUSCULAR; INTRAVENOUS PRN
Status: DISCONTINUED | OUTPATIENT
Start: 2017-08-07 | End: 2017-08-07

## 2017-08-07 RX ORDER — SODIUM CHLORIDE, SODIUM LACTATE, POTASSIUM CHLORIDE, CALCIUM CHLORIDE 600; 310; 30; 20 MG/100ML; MG/100ML; MG/100ML; MG/100ML
INJECTION, SOLUTION INTRAVENOUS CONTINUOUS PRN
Status: DISCONTINUED | OUTPATIENT
Start: 2017-08-07 | End: 2017-08-07

## 2017-08-07 RX ORDER — LABETALOL HYDROCHLORIDE 5 MG/ML
10 INJECTION, SOLUTION INTRAVENOUS
Status: DISCONTINUED | OUTPATIENT
Start: 2017-08-07 | End: 2017-08-07 | Stop reason: HOSPADM

## 2017-08-07 RX ORDER — LEVOTHYROXINE SODIUM 150 UG/1
150 TABLET ORAL DAILY
Qty: 90 TABLET | Refills: 4 | Status: SHIPPED | OUTPATIENT
Start: 2017-08-07 | End: 2018-04-04 | Stop reason: DRUGHIGH

## 2017-08-07 RX ORDER — HYDROMORPHONE HYDROCHLORIDE 1 MG/ML
.3-.5 INJECTION, SOLUTION INTRAMUSCULAR; INTRAVENOUS; SUBCUTANEOUS EVERY 5 MIN PRN
Status: DISCONTINUED | OUTPATIENT
Start: 2017-08-07 | End: 2017-08-07 | Stop reason: HOSPADM

## 2017-08-07 RX ORDER — ONDANSETRON 4 MG/1
4 TABLET, ORALLY DISINTEGRATING ORAL EVERY 30 MIN PRN
Status: DISCONTINUED | OUTPATIENT
Start: 2017-08-07 | End: 2017-08-07 | Stop reason: HOSPADM

## 2017-08-07 RX ORDER — CALCIUM CARBONATE 500 MG/1
1 TABLET, CHEWABLE ORAL 3 TIMES DAILY
Qty: 42 TABLET | Refills: 0 | Status: SHIPPED | OUTPATIENT
Start: 2017-08-07 | End: 2017-08-25

## 2017-08-07 RX ORDER — PREDNISONE 5 MG/1
5 TABLET ORAL DAILY
Status: COMPLETED | OUTPATIENT
Start: 2017-08-07 | End: 2017-08-07

## 2017-08-07 RX ORDER — ESMOLOL HYDROCHLORIDE 10 MG/ML
INJECTION INTRAVENOUS PRN
Status: DISCONTINUED | OUTPATIENT
Start: 2017-08-07 | End: 2017-08-07

## 2017-08-07 RX ORDER — NALOXONE HYDROCHLORIDE 0.4 MG/ML
.1-.4 INJECTION, SOLUTION INTRAMUSCULAR; INTRAVENOUS; SUBCUTANEOUS
Status: DISCONTINUED | OUTPATIENT
Start: 2017-08-07 | End: 2017-08-07 | Stop reason: HOSPADM

## 2017-08-07 RX ORDER — TRAMADOL HYDROCHLORIDE 50 MG/1
50 TABLET ORAL EVERY 8 HOURS PRN
Qty: 45 TABLET | Refills: 0 | Status: SHIPPED | OUTPATIENT
Start: 2017-08-07 | End: 2018-03-06

## 2017-08-07 RX ORDER — HEPARIN SODIUM,PORCINE 10 UNIT/ML
5-10 VIAL (ML) INTRAVENOUS EVERY 24 HOURS
Status: DISCONTINUED | OUTPATIENT
Start: 2017-08-07 | End: 2017-08-07 | Stop reason: HOSPADM

## 2017-08-07 RX ORDER — ONDANSETRON 2 MG/ML
INJECTION INTRAMUSCULAR; INTRAVENOUS PRN
Status: DISCONTINUED | OUTPATIENT
Start: 2017-08-07 | End: 2017-08-07

## 2017-08-07 RX ORDER — HEPARIN SODIUM,PORCINE 10 UNIT/ML
5-10 VIAL (ML) INTRAVENOUS
Status: DISCONTINUED | OUTPATIENT
Start: 2017-08-07 | End: 2017-08-07 | Stop reason: HOSPADM

## 2017-08-07 RX ORDER — PROPOFOL 10 MG/ML
INJECTION, EMULSION INTRAVENOUS PRN
Status: DISCONTINUED | OUTPATIENT
Start: 2017-08-07 | End: 2017-08-07

## 2017-08-07 RX ORDER — LIDOCAINE HYDROCHLORIDE 20 MG/ML
INJECTION, SOLUTION INFILTRATION; PERINEURAL PRN
Status: DISCONTINUED | OUTPATIENT
Start: 2017-08-07 | End: 2017-08-07

## 2017-08-07 RX ORDER — FENTANYL CITRATE 50 UG/ML
25-50 INJECTION, SOLUTION INTRAMUSCULAR; INTRAVENOUS
Status: DISCONTINUED | OUTPATIENT
Start: 2017-08-07 | End: 2017-08-07 | Stop reason: HOSPADM

## 2017-08-07 RX ORDER — HEPARIN SODIUM (PORCINE) LOCK FLUSH IV SOLN 100 UNIT/ML 100 UNIT/ML
5 SOLUTION INTRAVENOUS
Status: DISCONTINUED | OUTPATIENT
Start: 2017-08-07 | End: 2017-08-07 | Stop reason: HOSPADM

## 2017-08-07 RX ORDER — CALCITRIOL 0.25 UG/1
0.25 CAPSULE, LIQUID FILLED ORAL DAILY
Qty: 30 CAPSULE | Refills: 1 | Status: SHIPPED | OUTPATIENT
Start: 2017-08-07 | End: 2017-08-07

## 2017-08-07 RX ORDER — CALCITRIOL 0.25 UG/1
0.25 CAPSULE, LIQUID FILLED ORAL 3 TIMES DAILY
Qty: 42 CAPSULE | Refills: 0 | Status: SHIPPED | OUTPATIENT
Start: 2017-08-07 | End: 2017-08-25

## 2017-08-07 RX ADMIN — PROPOFOL 50 MG: 10 INJECTION, EMULSION INTRAVENOUS at 09:41

## 2017-08-07 RX ADMIN — FENTANYL CITRATE 50 MCG: 50 INJECTION, SOLUTION INTRAMUSCULAR; INTRAVENOUS at 09:25

## 2017-08-07 RX ADMIN — SODIUM CHLORIDE, POTASSIUM CHLORIDE, SODIUM LACTATE AND CALCIUM CHLORIDE: 600; 310; 30; 20 INJECTION, SOLUTION INTRAVENOUS at 09:11

## 2017-08-07 RX ADMIN — HEPARIN SODIUM (PORCINE) LOCK FLUSH IV SOLN 100 UNIT/ML 5 ML: 100 SOLUTION at 17:51

## 2017-08-07 RX ADMIN — REMIFENTANIL HYDROCHLORIDE 0.05 MCG/KG/MIN: 1 INJECTION, POWDER, LYOPHILIZED, FOR SOLUTION INTRAVENOUS at 09:30

## 2017-08-07 RX ADMIN — PHENYLEPHRINE HYDROCHLORIDE 100 MCG: 10 INJECTION, SOLUTION INTRAMUSCULAR; INTRAVENOUS; SUBCUTANEOUS at 09:43

## 2017-08-07 RX ADMIN — MIDAZOLAM HYDROCHLORIDE 1 MG: 1 INJECTION, SOLUTION INTRAMUSCULAR; INTRAVENOUS at 12:24

## 2017-08-07 RX ADMIN — LIDOCAINE HYDROCHLORIDE 100 MG: 20 INJECTION, SOLUTION INFILTRATION; PERINEURAL at 09:25

## 2017-08-07 RX ADMIN — PROPOFOL 100 MG: 10 INJECTION, EMULSION INTRAVENOUS at 09:25

## 2017-08-07 RX ADMIN — ONDANSETRON 4 MG: 2 INJECTION INTRAMUSCULAR; INTRAVENOUS at 12:59

## 2017-08-07 RX ADMIN — CEFAZOLIN SODIUM 2 G: 2 INJECTION, SOLUTION INTRAVENOUS at 10:10

## 2017-08-07 RX ADMIN — FENTANYL CITRATE 50 MCG: 50 INJECTION, SOLUTION INTRAMUSCULAR; INTRAVENOUS at 09:19

## 2017-08-07 RX ADMIN — CEFAZOLIN SODIUM 1 G: 2 INJECTION, SOLUTION INTRAVENOUS at 12:07

## 2017-08-07 RX ADMIN — LIDOCAINE HYDROCHLORIDE AND EPINEPHRINE 1 ML: 10; 10 INJECTION, SOLUTION INFILTRATION; PERINEURAL at 10:03

## 2017-08-07 RX ADMIN — MIDAZOLAM HYDROCHLORIDE 1 MG: 1 INJECTION, SOLUTION INTRAMUSCULAR; INTRAVENOUS at 09:19

## 2017-08-07 RX ADMIN — DEXAMETHASONE SODIUM PHOSPHATE 8 MG: 4 INJECTION, SOLUTION INTRA-ARTICULAR; INTRALESIONAL; INTRAMUSCULAR; INTRAVENOUS; SOFT TISSUE at 11:41

## 2017-08-07 RX ADMIN — PROPOFOL 30 MG: 10 INJECTION, EMULSION INTRAVENOUS at 12:26

## 2017-08-07 RX ADMIN — HYDROMORPHONE HYDROCHLORIDE 0.5 MG: 1 INJECTION, SOLUTION INTRAMUSCULAR; INTRAVENOUS; SUBCUTANEOUS at 12:50

## 2017-08-07 RX ADMIN — ESMOLOL HYDROCHLORIDE 20 MG: 10 INJECTION, SOLUTION INTRAVENOUS at 13:05

## 2017-08-07 RX ADMIN — SODIUM CHLORIDE, POTASSIUM CHLORIDE, SODIUM LACTATE AND CALCIUM CHLORIDE: 600; 310; 30; 20 INJECTION, SOLUTION INTRAVENOUS at 09:40

## 2017-08-07 RX ADMIN — PROPOFOL 50 MG: 10 INJECTION, EMULSION INTRAVENOUS at 09:27

## 2017-08-07 RX ADMIN — PREDNISONE 5 MG: 5 TABLET ORAL at 08:38

## 2017-08-07 RX ADMIN — HYDROMORPHONE HYDROCHLORIDE 0.5 MG: 1 INJECTION, SOLUTION INTRAMUSCULAR; INTRAVENOUS; SUBCUTANEOUS at 12:30

## 2017-08-07 RX ADMIN — PHENYLEPHRINE HYDROCHLORIDE 100 MCG: 10 INJECTION, SOLUTION INTRAMUSCULAR; INTRAVENOUS; SUBCUTANEOUS at 10:02

## 2017-08-07 NOTE — IP AVS SNAPSHOT
Same Day Surgery [address] [address]    Phone:  [phone]                                       After Visit Summary   8/7/2017    Cricket Chen    MRN: [id number]           After Visit Summary Signature Page     I have received my discharge instructions, and my questions have been answered. I have discussed any challenges I see with this plan with the nurse or doctor.    ..........................................................................................................................................  Patient/Patient Representative Signature      ..........................................................................................................................................  Patient Representative Print Name and Relationship to Patient    ..................................................               ................................................  Date                                            Time    ..........................................................................................................................................  Reviewed by Signature/Title    ...................................................              ..............................................  Date                                                            Time

## 2017-08-07 NOTE — OR NURSING
taLKED WITH PT fATHER [place]- HE VERIFIED PT mOTHER WILL PICK P T UP THIS AFTERNOON TO TRANSPORT HOME AND BOTH PARENTS WIILL BE WITH PT FOR NEXT 24 hours

## 2017-08-07 NOTE — ANESTHESIA PROCEDURE NOTES
Arterial Line Procedure Note  Staff:     Anesthesiologist:  JUDY ALLEN    Resident/CRNA:  IJEOMA LEROY    Arterial line performed by resident/CRNA in presence of a teaching physician    Location: In OR After Induction  Procedure Start/Stop Times:     patient identified, IV checked, risks and benefits discussed, informed consent, monitors and equipment checked and pre-op evaluation      Correct Patient: Yes      Correct Position: Yes      Correct Site: Yes      Correct Procedure: Yes      Correct Laterality:  N/A    Site Marked:  N/A  Line Placement:     Procedure:  Arterial Line    Insertion Site:  Radial    Insertion laterality:  Left    Skin Prep: Chloraprep      Patient Prep: patient draped, mask, sterile gloves, hat and hand hygiene      Local skin infiltration:  None    Ultrasound Guided?: Yes      Artery evaluated via ultrasound confirming patency.   Using realtime imaging, the artery was punctured and the needle was observed entering the artery.      A permanent image is entered into patient's chart.      Catheter size:  20 gauge, Quick cath    Cath secured with: anchor securement device      Dressing:  Tegaderm    Complications:  None obvious    Arterial waveform: Yes      IBP within 10% of NIBP: Yes

## 2017-08-07 NOTE — ANESTHESIA POSTPROCEDURE EVALUATION
Patient: Cricket Chen    Procedure(s):  Bilateral Total Thyroidectomy  - Wound Class: I-Clean    Diagnosis:Right Thyroid Nodule   Diagnosis Additional Information: No value filed.    Anesthesia Type:  General, ETT    Note:  Anesthesia Post Evaluation    Patient location during evaluation: PACU  Patient participation: Able to fully participate in evaluation  Level of consciousness: awake  Pain management: adequate  Airway patency: patent  Cardiovascular status: acceptable  Respiratory status: acceptable  Hydration status: acceptable  PONV: none     Anesthetic complications: None    Comments: No noted anesthetic complications.  Patient satisfied with anesthetic.          Last vitals:  Vitals:    08/07/17 1430 08/07/17 1445 08/07/17 1500   BP: 138/76 138/76 141/82   Resp: 16 16 16   Temp: 36.7  C (98  F)     SpO2: 94% 98% 95%         Electronically Signed By: Brandan Gant MD  August 7, 2017  3:17 PM

## 2017-08-07 NOTE — OR NURSING
Called pt Mother, Linsey, to confirm ride and staying with pt post procedure and unable to reach pt Mother by phone at this time, pt advised

## 2017-08-07 NOTE — OR NURSING
Dr. Adams called in regards to pt continuing PTA meds upon discharge. Pt missed some AM doses of lisinopril, bactrim, and prograf. Instructed pt to continue these when he gets home, aware BP is running 150's/90's at this time. Pt also contacted someone from transplant clinic in regards to restarting his prograf.

## 2017-08-07 NOTE — DISCHARGE INSTRUCTIONS
Take it easy when you get home.  Remember, same day surgery DOES NOT MEAN SAME DAY RECOVERY!  Healing is a gradual process.  You will need some time to recover - you may be more tired than you realize at first.  Rest and relax for at least the first 24 hours at home.  You'll feel better and heal faster if you take good care of yourself.    [facility], [place]  Same-Day Surgery   Adult Discharge Orders & Instructions     For 24 hours after surgery    1. Get plenty of rest.  A responsible adult must stay with you for at least 24 hours after you leave the hospital.   2. Do not drive or use heavy equipment.  If you have weakness or tingling, don't drive or use heavy equipment until this feeling goes away.  3. Do not drink alcohol.  4. Avoid strenuous or risky activities.  Ask for help when climbing stairs.   5. You may feel lightheaded.  IF so, sit for a few minutes before standing.  Have someone help you get up.   6. If you have nausea (feel sick to your stomach): Drink only clear liquids such as apple juice, ginger ale, broth or 7-Up.  Rest may also help.  Be sure to drink enough fluids.  Move to a regular diet as you feel able.  7. You may have a slight fever. Call the doctor if your fever is over 100 F (37.7 C) (taken under the tongue) or lasts longer than 24 hours.  8. You may have a dry mouth, a sore throat, muscle aches or trouble sleeping.  These should go away after 24 hours.  9. Do not make important or legal decisions.   Call your doctor for any of the followin.  Signs of infection (fever, growing tenderness at the surgery site, a large amount of drainage or bleeding, severe pain, foul-smelling drainage, redness, swelling).    2. It has been over 8 to 10 hours since surgery and you are still not able to urinate (pass water).    3.  Headache for over 24 hours.    To contact a doctor, call Dr Soto's office at [phone] or:        [phone] and ask for the resident on  call for          ENT  (answered 24 hours a day)- at night or on the weekend.       Emergency Department:    [facility]: [phone]       (TTY for hearing impaired: [phone])

## 2017-08-07 NOTE — IP AVS SNAPSHOT
MRN:[id number]                      After Visit Summary   8/7/2017    Cricket Chen    MRN: [id number]           Thank you!     Thank you for choosing [facility] for your care. Our goal is always to provide you with excellent care. Hearing back from our patients is one way we can continue to improve our services. Please take a few minutes to complete the written survey that you may receive in the mail after you visit with us. Thank you!        Patient Information     Date Of Birth          1980        About your hospital stay     You were admitted on:  August 7, 2017 You last received care in the:  Same Day Surgery [facility]    You were discharged on:  August 7, 2017       Who to Call     For medical emergencies, please call 911.  For non-urgent questions about your medical care, please call your primary care provider or clinic, None  For questions related to your surgery, please call your surgery clinic        Attending Provider     Provider Beka Griffin MD Otolaryngology       Primary Care Provider    Physician No Ref-Primary      After Care Instructions     Diet Instructions       Resume pre procedure diet            Discharge Instructions - Lifting restrictions       No heavy lifting > 10 lbs for 2 weeks.            Wound care       Keep dressing clean and dry and intact. Use bacitracin over your incision line for 2 days, then use vaseline.   Empty your CLEM drain twice daily and record the volume.   Sponge bath only while drain in place. Ok to shower 48 hours after removal of drain.                  Follow-up Appointments     Follow Up ([facility])       Follow-up in ENT clinic on Wednesday for drain removal. Get your PTH lab prior to your ENT visit.     Appointments on [place] and/or [facility] (with [facility] or [facility] provider or service). Call [phone] if you haven't heard regarding these appointments within 7 days of discharge.                  Your next 10  appointments already scheduled     Aug 16, 2017  8:40 AM CDT   (Arrive by 8:25 AM)   Return Visit with Beka Soto MD   [facility] Ear Nose and Throat ([facility])    [address] [place] [address]  [address] [facility] [address]   [phone]            Aug 31, 2017 11:30 AM CDT   [facility] Draw with  [facility]   [facility] Draw ([facility])    [address] [place] [id number]   [phone]            Oct 26, 2017  3:00 PM CDT   [facility] Draw with  [facility] DRAW   [facility] Draw ([facility])    [address] [place] [address]   [phone]            Oct 26, 2017  3:30 PM CDT   RETURN ONC with Girish Narayanan MD   [facility] Blood and Marrow Transplant ([facility])    [address] [place] [address]   [phone]            Jan 16, 2018 12:10 PM CST   (Arrive by 11:55 AM)   Return Liver Transplant with Laureen Galvan MD   [facility] Hepatology ([facility])    [address] [place] [address]  [address]rd [facility] [address]   [phone]            Jan 16, 2018  2:20 PM CST   (Arrive by 1:50 PM)   Return Kidney Transplant with Jake Sidhu MD   [facility] Nephrology ([facility])    [address] [place] [address]   [phone]              Future tests that were ordered for you     Parathyroid Hormone Intact                 Further instructions from your care team       Take it easy when you get home.  Remember, same day surgery DOES NOT MEAN SAME DAY RECOVERY!  Healing is a gradual process.  You will need some time to recover - you may be more tired than you realize at first.  Rest and relax for at least the first 24 hours at home.  You'll feel better and heal faster if you take good care of  yourself.    [facility] [place]  Same-Day Surgery   Adult Discharge Orders & Instructions     For 24 hours after surgery    1. Get plenty of rest.  A responsible adult must stay with you for at least 24 hours after you leave the hospital.   2. Do not drive or use heavy equipment.  If you have weakness or tingling, don't drive or use heavy equipment until this feeling goes away.  3. Do not drink alcohol.  4. Avoid strenuous or risky activities.  Ask for help when climbing stairs.   5. You may feel lightheaded.  IF so, sit for a few minutes before standing.  Have someone help you get up.   6. If you have nausea (feel sick to your stomach): Drink only clear liquids such as apple juice, ginger ale, broth or 7-Up.  Rest may also help.  Be sure to drink enough fluids.  Move to a regular diet as you feel able.  7. You may have a slight fever. Call the doctor if your fever is over 100 F (37.7 C) (taken under the tongue) or lasts longer than 24 hours.  8. You may have a dry mouth, a sore throat, muscle aches or trouble sleeping.  These should go away after 24 hours.  9. Do not make important or legal decisions.   Call your doctor for any of the followin.  Signs of infection (fever, growing tenderness at the surgery site, a large amount of drainage or bleeding, severe pain, foul-smelling drainage, redness, swelling).    2. It has been over 8 to 10 hours since surgery and you are still not able to urinate (pass water).    3.  Headache for over 24 hours.    To contact a doctor, call Dr Soto's office at [phone] or:        [phone] and ask for the resident on call for          ENT  (answered 24 hours a day)- at night or on the weekend.       Emergency Department:    [facility]: [phone]       (TTY for hearing impaired: [phone])          Pending Results     Date and Time Order Name Status Description    2017 1642 PARATHYROID HORMONE INTACT In process     2017  "1133 Surgical pathology exam In process             Admission Information     Date & Time Provider Department Dept. Phone    8/7/2017 Beka Soto MD Same Day Surgery [facility] [phone]      Your Vitals Were     Blood Pressure Pulse Temperature Respirations Height Weight    149/86 66 97.7  F (36.5  C) (Oral) 14 1.778 m (5' 10\") 92.4 kg (203 lb 9.9 oz)    Pulse Oximetry BMI (Body Mass Index)                97% 29.22 kg/m2          [facility] Information     [facility] gives you secure access to your electronic health record. If you see a primary care provider, you can also send messages to your care team and make appointments. If you have questions, please call your primary care clinic.  If you do not have a primary care provider, please call [phone] and they will assist you.        Care EveryWhere ID     This is your Care EveryWhere ID. This could be used by other organizations to access your [place] medical records  [id number]        Equal Access to Services     CLAY VIDES AH: Hadii steffany boldeno Sosalty, waaxda luqadaha, qaybta kaalmada adeegyacindi, robina white . So [facility] [phone].    ATENCIÓN: Si habla español, tiene a laura disposición servicios gratuitos de asistencia lingüística. Llame al [phone].    We comply with applicable federal civil rights laws and [place] laws. We do not discriminate on the basis of race, color, national origin, age, disability sex, sexual orientation or gender identity.               Review of your medicines      START taking        Dose / Directions    calcitRIOL 0.25 MCG capsule   Commonly known as:  ROCALTROL   Used for:  Papillary thyroid carcinoma (H)        Dose:  0.25 mcg   Take 1 capsule (0.25 mcg) by mouth 3 times daily   Quantity:  42 capsule   Refills:  0       calcium carbonate 500 MG chewable tablet   Commonly known as:  TUMS   Used for:  Papillary thyroid carcinoma (H)        Dose:  1 chew tab   Take 1 tablet (500 mg) by " mouth 3 times daily   Quantity:  42 tablet   Refills:  0       levothyroxine 150 MCG tablet   Commonly known as:  SYNTHROID/LEVOTHROID   Used for:  Papillary thyroid carcinoma (H)        Dose:  150 mcg   Take 1 tablet (150 mcg) by mouth daily   Quantity:  90 tablet   Refills:  4         CONTINUE these medicines which have NOT CHANGED        Dose / Directions    amLODIPine 10 MG tablet   Commonly known as:  NORVASC   Used for:  HTN (hypertension)        Dose:  10 mg   Take 1 tablet (10 mg) by mouth daily   Quantity:  90 tablet   Refills:  3       lisinopril 5 MG tablet   Commonly known as:  PRINIVIL/ZESTRIL   Used for:  HTN (hypertension)        Dose:  5 mg   Take 1 tablet (5 mg) by mouth daily   Quantity:  90 tablet   Refills:  3       magnesium oxide 400 (241.3 MG) MG tablet   Commonly known as:  MAG-OX   Used for:  Hypomagnesemia        Dose:  400 mg   Take 1 tablet (400 mg) by mouth 2 times daily   Quantity:  120 tablet   Refills:  11       predniSONE 5 MG tablet   Commonly known as:  DELTASONE   Used for:  Liver replaced by transplant (H), S/P kidney transplant        Dose:  5 mg   Take 1 tablet (5 mg) by mouth daily   Quantity:  90 tablet   Refills:  3       sulfamethoxazole-trimethoprim 400-80 MG per tablet   Commonly known as:  BACTRIM/SEPTRA   Indication:  PCP Prophylaxis   Used for:  Liver replaced by transplant (H), S/P kidney transplant        Dose:  1 tablet   Take 1 tablet by mouth daily   Quantity:  30 tablet   Refills:  11       tacrolimus 1 MG capsule   Commonly known as:  PROGRAF - GENERIC EQUIVALENT   Used for:  Liver replaced by transplant (H), S/P kidney transplant        Dose:  2 mg   Take 2 capsules (2 mg) by mouth 2 times daily   Quantity:  120 capsule   Refills:  11       traMADol 50 MG tablet   Commonly known as:  ULTRAM   Used for:  Chronic pain of left knee, Papillary thyroid carcinoma (H)        Dose:  50 mg   Take 1 tablet (50 mg) by mouth every 8 hours as needed for moderate pain    Quantity:  45 tablet   Refills:  0       traZODone 50 MG tablet   Commonly known as:  DESYREL   Used for:  Other insomnia        Dose:   mg   Take 0.5-2 tablets ( mg) by mouth At Bedtime   Quantity:  30 tablet   Refills:  1         STOP taking     vitamin D 2000 UNITS tablet                Where to get your medicines      These medications were sent to [facility] [place], [place] - [address] [facility]  [address] [facility] [address]     Phone:  [phone]     calcitRIOL 0.25 MCG capsule    calcium carbonate 500 MG chewable tablet    levothyroxine 150 MCG tablet         Some of these will need a paper prescription and others can be bought over the counter. Ask your nurse if you have questions.     Bring a paper prescription for each of these medications     traMADol 50 MG tablet                Protect others around you: Learn how to safely use, store and throw away your medicines at www.disposemymeds.org.             Medication List: This is a list of all your medications and when to take them. Check marks below indicate your daily home schedule. Keep this list as a reference.      Medications           Morning Afternoon Evening Bedtime As Needed    amLODIPine 10 MG tablet   Commonly known as:  NORVASC   Take 1 tablet (10 mg) by mouth daily                                calcitRIOL 0.25 MCG capsule   Commonly known as:  ROCALTROL   Take 1 capsule (0.25 mcg) by mouth 3 times daily                                calcium carbonate 500 MG chewable tablet   Commonly known as:  TUMS   Take 1 tablet (500 mg) by mouth 3 times daily                                levothyroxine 150 MCG tablet   Commonly known as:  SYNTHROID/LEVOTHROID   Take 1 tablet (150 mcg) by mouth daily                                lisinopril 5 MG tablet   Commonly known as:  PRINIVIL/ZESTRIL   Take 1 tablet (5 mg) by mouth daily                                magnesium oxide 400 (241.3 MG) MG tablet    Commonly known as:  MAG-OX   Take 1 tablet (400 mg) by mouth 2 times daily                                predniSONE 5 MG tablet   Commonly known as:  DELTASONE   Take 1 tablet (5 mg) by mouth daily   Last time this was given:  5 mg on 8/7/2017  8:38 AM                                sulfamethoxazole-trimethoprim 400-80 MG per tablet   Commonly known as:  BACTRIM/SEPTRA   Take 1 tablet by mouth daily                                tacrolimus 1 MG capsule   Commonly known as:  PROGRAF - GENERIC EQUIVALENT   Take 2 capsules (2 mg) by mouth 2 times daily                                traMADol 50 MG tablet   Commonly known as:  ULTRAM   Take 1 tablet (50 mg) by mouth every 8 hours as needed for moderate pain                                traZODone 50 MG tablet   Commonly known as:  DESYREL   Take 0.5-2 tablets ( mg) by mouth At Bedtime                                          More Information        Discharge Instructions: Caring for Your Luis Enrique-Saldana Drainage Tube  Your doctor discharges you with a Luis Enrique-Saldana drainage tube. Doctors commonly leave this drain within the abdominal cavity after surgery. It helps drain and collect blood and body fluid after surgery. This can prevent swelling and reduces the risk for infection. The tube is held in place by a few stitches. It is covered with a bandage. Your doctor will remove the drain when he or she determines you no longer need it.  Home care    Don t sleep on the same side as the tube.    Secure the tube and bag inside your clothing with a safety pin. This helps keep the tube from being pulled out.    Empty your drain at least twice a day. Empty it more often if the drain is full. Wash  and dry your hands before emptying the drain.    Lift the opening on the drain.    Drain the fluid into a measuring cup.    Record the amount of fluid each time you empty the drain. Include the date and time it was emptied. Share this information with your doctor on your next  visit.    Squeeze the bulb with your hands until you hear air coming out of the bulb if your doctor has instructed you to do so (sometimes the bulb is used as a reservoir without suction). Check with your doctor about specific drain instructions.    Close the opening.    Change the dressing around the tube every day.    Wash your hands.    Remove the old bandage.    Wash your hands again.    Wet a cotton swab and clean the skin around the incision and tube site. Use normal saline solution (salt and water). Or, you can use warm, soapy water.    Put a new bandage on the incision and tube site. Make the bandage large enough to cover the whole incision area.    Tape the bandage in place.    Keep the bandage and tube site dry when you shower. Ask your healthcare provider about the best way to do this.     Stripping  the tube helps keep blood clots from blocking the tube. Ask your nurse how often you should strip the tube. Stripping may not be needed, depending on where and why your doctor placed the tube. It may even be dangerous in some cases.     Hold the tubing where it leaves the skin, with one hand. This keeps it from pulling on the skin.    Pinch the tubing with the thumb and first finger of your other hand.    Slowly and firmly pull your thumb and first finger down the tubing. You may find it helpful to hold an alcohol swab between your fingers and the tube to lubricate the tubing.    If the pulling hurts or feels like it s coming out of the skin, stop. Begin again more gently.  Follow-up care  Make a follow-up appointment as directed by our staff.     When to seek medical care  Call your healthcare provider right away if you have any of the following:    New or increased pain around the tube    Redness, swelling, or warmth around the incision or tube    Drainage that is foul-smelling    Vomiting    Fever of 100.4 F (38 C)    Fluid leaking around the tube    Incision seems not to be healing    Stitches become  loose    Tube falls out or breaks    Drainage that changes from light pink to dark red    Blood clots in the drainage bulb    A sudden increase or decrease in the amount of drainage (over 30 mL)   Date Last Reviewed: 2/1/2017 2000-2017 [employer]. [address] [place] [address]. All rights reserved. This information is not intended as a substitute for professional medical care. Always follow your healthcare professional's instructions.

## 2017-08-07 NOTE — BRIEF OP NOTE
[facility], [place]    Brief Operative Note    Pre-operative diagnosis: Papillary thyroid carcinoma   Post-operative diagnosis Papillary thyroid carcinoma  Procedure: Procedure(s):  Bilateral Total Thyroidectomy  - Wound Class: I-Clean  Surgeon: Surgeon(s) and Role:     * Beka Soto MD - Primary     * Paula Hughes MD - Resident - Assisting  Anesthesia: General   Estimated blood loss: 20 mL  Drains: Luis Enrique-Saldana  Specimens:   ID Type Source Tests Collected by Time Destination   A : right lobe of thyroid, stitch superior lobe Tissue Thyroid, Right SURGICAL PATHOLOGY EXAM Beka Soto MD 8/7/2017 11:33 AM    B : Left Thyroid Tissue Thyroid, left SURGICAL PATHOLOGY EXAM Beka Soto MD 8/7/2017 12:20 PM      Findings:   Unable to identify recurrent laryngeal nerves bilaterally. Nerve stimulator tube twisted. .  Complications: None.  Implants: None.    Paula Hughes MD  Otolaryngology- Head and Neck Surgery PGY3

## 2017-08-07 NOTE — ANESTHESIA CARE TRANSFER NOTE
Patient: Cricket Chen    Procedure(s):  Bilateral Total Thyroidectomy  - Wound Class: I-Clean    Diagnosis: Right Thyroid Nodule   Diagnosis Additional Information: No value filed.    Anesthesia Type:   General, ETT     Note:  Airway :Face Mask  Patient transferred to:PACU  Comments: VSS. Patient satting well on simple face mask with pain well controlled. He has no stridor, signs of obstruction and is ventilating appropriately.       Vitals: (Last set prior to Anesthesia Care Transfer)    CRNA VITALS  8/7/2017 1238 - 8/7/2017 1323      8/7/2017             Pulse: 98    ART BP: (!)  233/91    ART Mean: 135    Ht Rate: 107    SpO2: 100 %    Resp Rate (observed): 14                Electronically Signed By: Cricket Sena MD  August 7, 2017  1:23 PM

## 2017-08-07 NOTE — OP NOTE
Operative Report  August 7, 2017    Pre-op Diagnosis:  Papillary Thyroid Cancer  Post-op Diagnosis:   Same    Procedure:   Total thyroidectomy    Primary Surgeon:  Beka Soto MD  Assistant Surgeon: aPula Hughes MD, Resident    Anesthesia: GETA     EBL:   25 cc  Drains:  CLEM      Complications:  None   Specimens: Left thyroid lobe, right thyroid    Findings:   1. Inflamed thyroid R>L.     2. Nodule noted on left side  3. Right superior parathyroid identified and preserved; the rest were not dissected  4. Recurrent nerves were seen during the procedure but did not stimulate with the NIM.    Indications:  Mr. Chen is a 36 year old male with a history of liver and renal transplant with PTLD, and recent thyroid nodule consistent with papillary thyroid cancer on biopsy. The above mentioned procedure was recommended, and the patient elected to go forward with the procedure after detailed explanation of risks, benefits, and alternatives.     Procedure:  After consent, the patient was brought to the operating room and placed in the supine position.  Following induction, the patient was intubated orotracheally with an EMG endotracheal tube, and the leads were connected to the nerve integrity monitoring system.  Monitoring lines were placed as appropriate. The patient was left unturned, with the tube taped in the midline.  The head and neck were extended.  A 5 cm low neck incision was marked and infiltrated with 1% Xylocaine with 1:100,000 epinephrine solution in a skin crease in his large neck.  The wound was prepped and draped in standard sterile fashion.      The incision was made and skin flaps were elevated.  The strap muscles were  in the midline and the thyroid was exposed. The right thyroid lobe was exposed with blunt dissection, using bipolar cautery to assure hemostasis.  The lobe was dissected in the capsular plane.  The upper pole was freed, with careful dissection to avoid injury to the  external branch of the superior laryngeal nerve. The lateral thyroid lobe was gently mobilized with retraction and blunt dissection. There was a large right upper lobe that extended quite posteriorly.  The nerve was protected with anatomic landmarks, and identified, but for some reason the NIM did not correctly identify the nerves.  We preserved what we felt were the nerves on both sides.   We identified the right upper parathyroid in the t-e groove superiorly and this was preserved.   The lobe was dissected from lateral to medial, avoiding the course of the nerve as it entered the crico-thyroid joint. The upper pole artery and veins were ligated and divided.  Inferiorly, the terminal branches of the inferior thyroid artery were then divided as they entered the thyroid, to avoid injury to the parathyroid vascular supply. The isthmus was divided after the lobe was mobilized, and the right lobe was delivered.      On the left, the dissection was actually easier even though this side had the tumor.  The gland seemed less inflamed.  The upper lobe was smaller.  Otherwise, the dissection was similar, with a capsular dissection again.  We did not search for parathyroids.  The nerve was again seen but did not stimulate, but we preserved it.  We had a little more oozing in the Berry's ligament area but this was controlled by bipolar.  We did not identify any lymph nodes on either side.     The wound bed was irrigated and hemostasis was assured meticulously.  The straps were loosely approximated anteriorly.  A #10 Luis Enrique-Saldana drain was placed to provide evacuation of bleeding. The incisions were then closed with 3-0 vicryl in the platysma layer, and a running 5-0 nylon in the skin.  The patient tolerated the procedure well. No complications were encountered.      The patient was awakened in the OR and extubated.  He had no difficulty breathing upon awakening, had no stridor, and had a strong voice. Dr. Beka Soto was  present for the entire procedure.     Paula Hughes MD  PGY-3      ADDENDUM:  I was present with the resident during the entire operation from opening to closure.  I have edited Dr. Hughes's note to reflect my perspective on operative findings and flow.        Beka Soto MD  Otolaryngology/Head & Neck Surgery  [phone]

## 2017-08-09 ENCOUNTER — ALLIED HEALTH/NURSE VISIT (OUTPATIENT)
Dept: OTOLARYNGOLOGY | Facility: CLINIC | Age: 37
End: 2017-08-09

## 2017-08-09 DIAGNOSIS — E03.9 HYPOTHYROIDISM: ICD-10-CM

## 2017-08-09 DIAGNOSIS — E03.9 HYPOTHYROIDISM: Primary | ICD-10-CM

## 2017-08-09 LAB — TSH SERPL DL<=0.005 MIU/L-ACNC: 2.18 MU/L (ref 0.4–4)

## 2017-08-09 NOTE — MR AVS SNAPSHOT
After Visit Summary   8/9/2017    Cricket Chen    MRN: [id number]           Patient Information     Date Of Birth          1980        Visit Information        Provider Department      8/9/2017 12:00 PM Nurse, Destiny Ent [facility] Ear Nose and Throat        Today's Diagnoses     Hypothyroidism    -  1      Care Instructions    1.  You were seen in the ENT Clinic today.  If you have any questions or concerns after your appointment, please call [phone].  Press option #1 for scheduling related needs.  Press option #3 for Nurse advice.  2.  Plan is to return to clinic for post-op appt with Dr. Soto and suture removal.      Krystina BANGURAN, RN  [facility] ENT   Head & Neck Surgery               Follow-ups after your visit        Your next 10 appointments already scheduled     Aug 16, 2017  8:40 AM CDT   (Arrive by 8:25 AM)   Return Visit with Beka Soto MD   [facility] Ear Nose and Throat ([facility])    [address] [place] [address]  [address] [facility] [address] [phone]            Aug 31, 2017 11:30 AM CDT   [facility] Draw with  [facility] D[facility]   [facility] Draw ([facility])    [address] [place] [address]  [address] [facility] [address] [phone]            Oct 26, 2017  3:00 PM CDT   [facility] Draw with  [facility] DRAW   [facility] Draw ([facility])    [address] [place] [address]  [address] [facility] [address]   [phone]            Oct 26, 2017  3:30 PM CDT   RETURN ONC with Girish Narayanan MD   [facility] Blood and Marrow Transplant ([facility])    [address] [place] [address]  [address] [facility] [address]   [phone]            Jan 16, 2018 12:10 PM CST   (Arrive by 11:55 AM)   Return Liver Transplant with Laureen Galvan MD   [facility] Hepatology ([facility])    [address] [place] [address]  Floor  [facility] [id number] [phone]            Jan 16, 2018  2:20 PM CST   (Arrive by 1:50 PM)   Return Kidney Transplant with Jake Sidhu MD   [facility] Nephrology ([facility])    [address] [facility]  [address] [facility] [id number] [phone]              Who to contact     Please call your clinic at [phone] to:    Ask questions about your health    Make or cancel appointments    Discuss your medicines    Learn about your test results    Speak to your doctor   If you have compliments or concerns about an experience at your clinic, or if you wish to file a complaint, please contact [facility] Patient Relations at [phone] or email us at [email or web address]         Additional Information About Your Visit        SleepOuthart Information     Extended Care Information Networkt gives you secure access to your electronic health record. If you see a primary care provider, you can also send messages to your care team and make appointments. If you have questions, please call your primary care clinic.  If you do not have a primary care provider, please call [phone] and they will assist you.      Mimetas is an electronic gateway that provides easy, online access to your medical records. With Mimetas, you can request a clinic appointment, read your test results, renew a prescription or communicate with your care team.     To access your existing account, please contact your [facility] Clinic or call [phone] for assistance.        Care EveryWhere ID     This is your Care EveryWhere ID. This could be used by other organizations to access your [facility] medical records  [id number]         Blood Pressure from Last 3 Encounters:   08/07/17 (!) 158/99   07/26/17 119/77   07/20/17 120/71    Weight from Last 3 Encounters:   08/07/17 92.4 kg (203 lb 9.9 oz)   07/20/17 93.3 kg (205 lb 11.2 oz)   06/30/17 92.1 kg (203 lb)                Primary Care Provider    Physician No Ref-Primary       No address on file        Equal Access to Services     GRETELSHAWN MAHOGANY : Hadii aad ku jaydon Barroso, waraoulda tateluis carlos, mary analilamberto adameltoncindi, robina alcantar rohitnestor turnersnowannika sibley. So [facility] [phone].    ATENCIÓN: Si habla español, tiene a laura disposición servicios gratuitos de asistencia lingüística. Llame al [phone].    We comply with applicable federal civil rights laws and [place] laws. We do not discriminate on the basis of race, color, national origin, age, disability sex, sexual orientation or gender identity.            Thank you!     Thank you for choosing [facility] EAR NOSE AND THROAT  for your care. Our goal is always to provide you with excellent care. Hearing back from our patients is one way we can continue to improve our services. Please take a few minutes to complete the written survey that you may receive in the mail after your visit with us. Thank you!             Your Updated Medication List - Protect others around you: Learn how to safely use, store and throw away your medicines at www.disposemymeds.org.          This list is accurate as of: 8/9/17  1:09 PM.  Always use your most recent med list.                   Brand Name Dispense Instructions for use Diagnosis    amLODIPine 10 MG tablet    NORVASC    90 tablet    Take 1 tablet (10 mg) by mouth daily    HTN (hypertension)       calcitRIOL 0.25 MCG capsule    ROCALTROL    42 capsule    Take 1 capsule (0.25 mcg) by mouth 3 times daily    Papillary thyroid carcinoma (H)       calcium carbonate 500 MG chewable tablet    TUMS    42 tablet    Take 1 tablet (500 mg) by mouth 3 times daily    Papillary thyroid carcinoma (H)       levothyroxine 150 MCG tablet    SYNTHROID/LEVOTHROID    90 tablet    Take 1 tablet (150 mcg) by mouth daily    Papillary thyroid carcinoma (H)       lisinopril 5 MG tablet    PRINIVIL/ZESTRIL    90 tablet    Take 1 tablet (5 mg) by mouth daily    HTN  (hypertension)       magnesium oxide 400 (241.3 MG) MG tablet    MAG-OX    120 tablet    Take 1 tablet (400 mg) by mouth 2 times daily    Hypomagnesemia       predniSONE 5 MG tablet    DELTASONE    90 tablet    Take 1 tablet (5 mg) by mouth daily    Liver replaced by transplant (H), S/P kidney transplant       sulfamethoxazole-trimethoprim 400-80 MG per tablet    BACTRIM/SEPTRA    30 tablet    Take 1 tablet by mouth daily    Liver replaced by transplant (H), S/P kidney transplant       tacrolimus 1 MG capsule    PROGRAF - GENERIC EQUIVALENT    120 capsule    Take 2 capsules (2 mg) by mouth 2 times daily    Liver replaced by transplant (H), S/P kidney transplant       traMADol 50 MG tablet    ULTRAM    45 tablet    Take 1 tablet (50 mg) by mouth every 8 hours as needed for moderate pain    Chronic pain of left knee, Papillary thyroid carcinoma (H)       traZODone 50 MG tablet    DESYREL    30 tablet    Take 0.5-2 tablets ( mg) by mouth At Bedtime    Other insomnia

## 2017-08-09 NOTE — NURSING NOTE
Pt here today for drain removal.  Dr. Soto was available for assistance if needed.  Pt tolerated procedure well and was instructed to keep dressing in place for 24 hours.      Krystina BANGURAN, RN  [phone]  [facility] ENT   Head & Neck Surgery   8/9/2017 1:09 PM

## 2017-08-09 NOTE — PATIENT INSTRUCTIONS
1.  You were seen in the ENT Clinic today.  If you have any questions or concerns after your appointment, please call [phone].  Press option #1 for scheduling related needs.  Press option #3 for Nurse advice.  2.  Plan is to return to clinic for post-op appt with Dr. Soto and suture removal.      Krystina BANGURAN, RN  [facility] ENT   Head & Neck Surgery

## 2017-08-10 NOTE — PROGRESS NOTES
REASON FOR VISIT:  Followup for history of PTLD, status post 4 weekly Rituxan and 4 cycles of R-CEOP presenting for the end -of -therapy follow up.    HISTORY OF PRESENT ILLNESS/REVIEW OF SYSTEMS:  Mr. Chen is a very pleasant 36-year-old gentleman, with a prior history as outlined above, who was diagnosed with PTLD early this year based on an excisional lymph node biopsy from 02/01/2017 (right neck biopsy of partial necrotic mass).  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  CellCept was reduced from 1500 b.i.d. to 750 twice a day, and  tacrolimus 3 mg twice a day was decreased to 2 mg twice a day. He has tolerated this  RSST very well. He recently underwent end of Rx PET/CT scans, and presents today to discuss these results.    Of note, his diagnostic PET/CT scan identified a thyroid nodule, which was consistent with papillary thyroid carcinoma based on the ultrasound-guided fine needle aspiration.     Since last visit with me  He denies any F/C/NS. No pain, N/V/D. He is not aware of any lumps across his body.       REVIEW OF SYMPTOMS:  His energy and appetite have been stable. He reports no new complaints.  He denies any chest pain or dyspnea on exertion. Improved LE edema.      The rest of 12 points of ROS were reviewed and found to be negative, unless as mentioned above.      PHYSICAL EXAMINATION:   VITAL SIGNS:  Reviewed in Epic and found to be acceptable.   GENERAL:  Not in acute distress, alert and oriented, well-nourished and hydrated.   ECOG PERFORMANCE STATUS:  0.   KPS:  100%.   HEENT:  Moist mucous membranes with no thrush.  Pupils are equally round and reactive to light with no conjunctival erythema or jaundice.   NECK:  No palpable masses/lymphadenopathy.   CARDIOVASCULAR:  Regular rate and rhythm, no murmurs.   PULMONARY:  Clear to auscultation bilaterally.   ABDOMEN:  Soft, nontender and  "nondistended, no palpable masses.   EXTREMITIES:  trace bilateral ankle edema.   SKIN:  No rashes  NEUROLOGIC:  Grossly nonfocal.      LABORATORY DATA:   Reviewed in epic.   Component      Latest Ref Rng & Units 7/20/2017   WBC      4.0 - 11.0 10e9/L 3.6 (L)   RBC Count      4.4 - 5.9 10e12/L 4.49   Hemoglobin      13.3 - 17.7 g/dL 14.0   Hematocrit      40.0 - 53.0 % 41.8   MCV      78 - 100 fl 93   MCH      26.5 - 33.0 pg 31.2   MCHC      31.5 - 36.5 g/dL 33.5   RDW      10.0 - 15.0 % 12.7   Platelet Count      150 - 450 10e9/L 101 (L)   Diff Method       Automated Method   % Neutrophils      % 79.6   % Lymphocytes      % 8.7   % Monocytes      % 9.2   % Eosinophils      % 0.8   % Basophils      % 0.6   % Immature Granulocytes      % 1.1   Nucleated RBCs      0 /100 0   Absolute Neutrophil      1.6 - 8.3 10e9/L 2.8   Absolute Lymphocytes      0.8 - 5.3 10e9/L 0.3 (L)   Absolute Monocytes      0.0 - 1.3 10e9/L 0.3   Absolute Eosinophils      0.0 - 0.7 10e9/L 0.0   Absolute Basophils      0.0 - 0.2 10e9/L 0.0   Abs Immature Granulocytes      0 - 0.4 10e9/L 0.0   Absolute Nucleated RBC       0.0   ldh wnl     BIOPSIES:  thyroid biopsy (FNA) consistent with papillary thyroid carcinoma.      IMAGING:  Recent imaging /PET was c/w CR by Lugano   \"  1. In this patient with a history of PTLD EBV-positive monomorphic  DLBCL, there has been a complete response by Lugano criteria.   2. Gastric FDG uptake which can be seen with gastritis.  3. Postsurgical changes of D-TGA repair with hypertrophic changes of  the systemic right ventricle, compatible with prior echocardiogram  results. Subendocardial fatty deposition in the left ventricle may be  related to left ventricular dysplastic changes or prior myocardial  infarction.  4. Postsurgical changes of liver and kidney transplant. Unchanged  nonspecific patchy hypodensities in the renal transplant.   \"     ASSESSMENT AND PLAN:  This is a very pleasant 36-year-old gentleman with " a prior history of combined liver/kidney transplant back in 05/2016, who developed  EBV-positive monomorphic PTLD (stage III), now treated on RSST protocol with 4 weekly infusions of rituximab,followed by R-COEP x 4,  who presents for his followup visit after recent restaging PET/CT scans.     - PTLD/DLBCL:  We reviewed with the patient his interval progress with specific focus on the results of his recent end of therapy PET/CT scans.  I personally reviewed those and agreed with CR by Lugano criteria. There are no signs of d-se progression based on his labs and exam. He will cont on current IS per SOT recs. He will also fu with endocrine for papillary thyroid ca and may require thyroidectomy.   I will see him back in 3 mo with repeat labs and exam.      I spent over 50% of this 40-minute encounter in counseling the patient, updating him on his interval progress, and outlining the ongoing plan of care as outlined above.  Overall, the complexity of the case remains high.      Girish Narayanan MD PhD  Hematology, Oncology and Transplantation  [facility]

## 2017-08-14 LAB — COPATH REPORT: NORMAL

## 2017-08-16 ENCOUNTER — OFFICE VISIT (OUTPATIENT)
Dept: OTOLARYNGOLOGY | Facility: CLINIC | Age: 37
End: 2017-08-16

## 2017-08-16 VITALS — HEIGHT: 70 IN | WEIGHT: 201 LBS | BODY MASS INDEX: 28.77 KG/M2

## 2017-08-16 DIAGNOSIS — C73 PAPILLARY ADENOCARCINOMA OF THYROID (H): Primary | ICD-10-CM

## 2017-08-16 ASSESSMENT — PAIN SCALES - GENERAL: PAINLEVEL: NO PAIN (0)

## 2017-08-16 NOTE — PROGRESS NOTES
August 16, 2017        HISTORY OF PRESENT ILLNESS:    Mr. Chen is a 36-year-old with a history of post-transplant lymphoproliferative disorder, EBV positive and a new papillary carcinoma of the right tumor diagnosed by Dr. Gray from Endocrinology in March.     We performed a total thyroidectomy last week. He is doing well, and had minimal pain post-operatively. He has been taking his Synthroid, Tums and Rocaltrol as prescribed. He denies any symptoms of hypocalcemia. He has not had any drainage or swelling under the incision.     PHYSICAL EXAMINATION:    NAD, well appearing, excellent spirits  EOMI, Neck is soft, supple, no lymphadenopathy. Thyroid incision is healing well, although with mild erythema and post-op edema. Sutures removed on today's exam. No sign of infection, drainage, hematoma  Voice is excellent with no weakness.  Non-labored breathing on room air, no stridor      IMPRESSION AND PLAN:    We reviewed the pathology with Mr. Chen today. His papillary thryoid cancer measured 0.8 cm on the right.  There were no concerning features of perineural or angioinvasion, and no capsular disruption.  He had a small 1 mm focus of a papillary cancer in the contralateral node, making this tumor multi-site.  No parathyroids were identified.      We explained that this pathology is very favorable and that he would not likely require additional therapy if he did not have other co-morbidities.  We would typically work with an endocrinologist to perform disease surveillance with thyroglobulin levels and ultrasounds as indicated.  However, with the immunosuppression, we instructed Mr. Chen to follow-up with Dr. Gray for further care to make sure additional vigilance is not required.     He will stop his calcium pills now.  He will continue wound care with aquaphor to the incision line and sun protection for the summer.  He will let us know if the wound is not healing well or remains  swollen after another week or so.    Paula Hughes MD  Otolaryngology- Head and Neck Surgery PGY3      ADDENDUM:  I have separately spoken with and examined Mr. Chen.  I have edited Dr. Hughes's note to reflect my perspective on the findings, assessment, and plan.        Beka Soto MD  Otolaryngology/Head & Neck Surgery  [phone]                   CC  Antoinette Galvan MD          Jake Sidhu MD   Mail Code [id number]      Eugenia Perez MD         Zuleyma Gray MD  Division of Diabetes and Endocrinology  Department of Medicine     Girish Narayanan MD  Hematology Oncology  Department of Medicine

## 2017-08-16 NOTE — PATIENT INSTRUCTIONS
1.  You were seen in the ENT Clinic today by Dr. Soto.  If you have any questions or concerns after your appointment, please call [phone].  Press option #1 for scheduling related needs.  Press option #3 for Nurse advice.  2.  Plan is to follow up with Endocrinologist, Dr. Gray for further care.    Krystina BANGURAN, RN  [facility] ENT   Head & Neck Surgery

## 2017-08-16 NOTE — LETTER
8/16/2017       RE: Cricket Chen  [address]  [facility] [address]     Dear Colleague,    Thank you for referring your patient, Cricket Chen, to the [facility] EAR NOSE AND THROAT at [facility]. Please see a copy of my visit note below.    August 16, 2017        HISTORY OF PRESENT ILLNESS:    Mr. Chen is a 36-year-old with a history of post-transplant lymphoproliferative disorder, EBV positive and a new papillary carcinoma of the right tumor diagnosed by Dr. Gray from Endocrinology in March.     We performed a total thyroidectomy last week. He is doing well, and had minimal pain post-operatively. He has been taking his Synthroid, Tums and Rocaltrol as prescribed. He denies any symptoms of hypocalcemia. He has not had any drainage or swelling under the incision.     PHYSICAL EXAMINATION:    NAD, well appearing, excellent spirits  EOMI, Neck is soft, supple, no lymphadenopathy. Thyroid incision is healing well, although with mild erythema and post-op edema. Sutures removed on today's exam. No sign of infection, drainage, hematoma  Voice is excellent with no weakness.  Non-labored breathing on room air, no stridor      IMPRESSION AND PLAN:    We reviewed the pathology with Mr. Chen today. His papillary thryoid cancer measured 0.8 cm on the right.  There were no concerning features of perineural or angioinvasion, and no capsular disruption.  He had a small 1 mm focus of a papillary cancer in the contralateral node, making this tumor multi-site.  No parathyroids were identified.      We explained that this pathology is very favorable and that he would not likely require additional therapy if he did not have other co-morbidities.  We would typically work with an endocrinologist to perform disease surveillance with thyroglobulin levels and ultrasounds as indicated.  However, with the immunosuppression, we instructed Mr. Chen to follow-up with   Isaac for further care to make sure additional vigilance is not required.     He will stop his calcium pills now.  He will continue wound care with aquaphor to the incision line and sun protection for the summer.  He will let us know if the wound is not healing well or remains swollen after another week or so.    Paula Hughes MD  Otolaryngology- Head and Neck Surgery PGY3      ADDENDUM:  I have separately spoken with and examined Mr. Chen.  I have edited Dr. Hughes's note to reflect my perspective on the findings, assessment, and plan.        Beka Soto MD  Otolaryngology/Head & Neck Surgery  [phone]                   CC  Antoinette Galvan MD          Jake Sidhu MD   Mail Code [id number]      Eugenia Perez MD         Zuleyma Gray MD  Division of Diabetes and Endocrinology  Department of Medicine     Girish Narayanan MD  Hematology Oncology  Department of Medicine

## 2017-08-16 NOTE — MR AVS SNAPSHOT
After Visit Summary   8/16/2017    Cricket Chne    MRN: [id number]           Patient Information     Date Of Birth          1980        Visit Information        Provider Department      8/16/2017 8:40 AM Beka Soto MD [facility] Ear Nose and Throat        Today's Diagnoses     Papillary adenocarcinoma of thyroid (H)    -  1      Care Instructions    1.  You were seen in the ENT Clinic today by Dr. Soto.  If you have any questions or concerns after your appointment, please call [phone].  Press option #1 for scheduling related needs.  Press option #3 for Nurse advice.  2.  Plan is to follow up with Endocrinologist, Dr. Gray for further care.    Krystina BANGURAN, RN  [facility] ENT   Head & Neck Surgery               Follow-ups after your visit        Your next 10 appointments already scheduled     Aug 31, 2017 11:30 AM CDT   [facility] Draw with  [facility]   [facility] Draw ([facility])    [address]  [address] [facility] [id number] [phone]            Oct 26, 2017  3:00 PM CDT   [facility] Draw with  [facility]   [facility] Draw ([facility])    [address]  [address] [facility] [id number] [phone]            Oct 26, 2017  3:30 PM CDT   RETURN ONC with Girish Narayanan MD   [facility] Blood and Marrow Transplant ([facility])    [address]  [address] [facility] [id number] [phone]            Jan 16, 2018 12:10 PM CST   (Arrive by 11:55 AM)   Return Liver Transplant with Laureen Galvan MD   [facility] Hepatology ([facility])    [address]  [address] [facility] [id number] [phone]            Jan 16, 2018  2:20 PM CST   (Arrive by 1:50 PM)   Return Kidney Transplant with Jake Sidhu MD   [facility] Nephrology ([facility]  "Surgery Center)    [address]  [address] [facility] [address] [phone]              Who to contact     Please call your clinic at [phone] to:    Ask questions about your health    Make or cancel appointments    Discuss your medicines    Learn about your test results    Speak to your doctor   If you have compliments or concerns about an experience at your clinic, or if you wish to file a complaint, please contact [facility] Patient Relations at [phone] or email us at [email or web address]         Additional Information About Your Visit        [email or web address] Information     [employer] gives you secure access to your electronic health record. If you see a primary care provider, you can also send messages to your care team and make appointments. If you have questions, please call your primary care clinic.  If you do not have a primary care provider, please call [phone] and they will assist you.      [employer] is an electronic gateway that provides easy, online access to your medical records. With [employer], you can request a clinic appointment, read your test results, renew a prescription or communicate with your care team.     To access your existing account, please contact your [facility] or call [phone] for assistance.        Care EveryWhere ID     This is your Care EveryWhere ID. This could be used by other organizations to access your [place] medical records  [id number]        Your Vitals Were     Height BMI (Body Mass Index)                1.778 m (5' 10\") 28.84 kg/m2           Blood Pressure from Last 3 Encounters:   08/07/17 (!) 158/99   07/26/17 119/77   07/20/17 120/71    Weight from Last 3 Encounters:   08/16/17 91.2 kg (201 lb)   08/07/17 92.4 kg (203 lb 9.9 oz)   07/20/17 93.3 kg (205 lb 11.2 oz)              Today, you had the following     No orders found for display       Primary Care Provider    Physician " No Ref-Primary       No address on file        Equal Access to Services     CLAY VIDES : Hadii steffany jurado jaydon Barroso, waraoulda tateperezha, mary analirajwindercindi kenyonbisishayna puentejeny ortiz rohitnestor turnersnowannika sibley. So [facility] [phone].    ATENCIÓN: Si habla español, tiene a laura disposición servicios gratuitos de asistencia lingüística. Ginaame al [phone].    We comply with applicable federal civil rights laws and [place] laws. We do not discriminate on the basis of race, color, national origin, age, disability sex, sexual orientation or gender identity.            Thank you!     Thank you for choosing [facility] EAR NOSE AND THROAT  for your care. Our goal is always to provide you with excellent care. Hearing back from our patients is one way we can continue to improve our services. Please take a few minutes to complete the written survey that you may receive in the mail after your visit with us. Thank you!             Your Updated Medication List - Protect others around you: Learn how to safely use, store and throw away your medicines at www.disposemymeds.org.          This list is accurate as of: 8/16/17  6:32 PM.  Always use your most recent med list.                   Brand Name Dispense Instructions for use Diagnosis    amLODIPine 10 MG tablet    NORVASC    90 tablet    Take 1 tablet (10 mg) by mouth daily    HTN (hypertension)       calcitRIOL 0.25 MCG capsule    ROCALTROL    42 capsule    Take 1 capsule (0.25 mcg) by mouth 3 times daily    Papillary thyroid carcinoma (H)       calcium carbonate 500 MG chewable tablet    TUMS    42 tablet    Take 1 tablet (500 mg) by mouth 3 times daily    Papillary thyroid carcinoma (H)       levothyroxine 150 MCG tablet    SYNTHROID/LEVOTHROID    90 tablet    Take 1 tablet (150 mcg) by mouth daily    Papillary thyroid carcinoma (H)       lisinopril 5 MG tablet    PRINIVIL/ZESTRIL    90 tablet    Take 1 tablet (5 mg) by mouth daily    HTN (hypertension)       magnesium oxide 400  (241.3 MG) MG tablet    MAG-OX    120 tablet    Take 1 tablet (400 mg) by mouth 2 times daily    Hypomagnesemia       predniSONE 5 MG tablet    DELTASONE    90 tablet    Take 1 tablet (5 mg) by mouth daily    Liver replaced by transplant (H), S/P kidney transplant       sulfamethoxazole-trimethoprim 400-80 MG per tablet    BACTRIM/SEPTRA    30 tablet    Take 1 tablet by mouth daily    Liver replaced by transplant (H), S/P kidney transplant       tacrolimus 1 MG capsule    GENERIC EQUIVALENT    120 capsule    Take 2 capsules (2 mg) by mouth 2 times daily    Liver replaced by transplant (H), S/P kidney transplant       traMADol 50 MG tablet    ULTRAM    45 tablet    Take 1 tablet (50 mg) by mouth every 8 hours as needed for moderate pain    Chronic pain of left knee, Papillary thyroid carcinoma (H)       traZODone 50 MG tablet    DESYREL    30 tablet    Take 0.5-2 tablets ( mg) by mouth At Bedtime    Other insomnia

## 2017-08-25 ENCOUNTER — OFFICE VISIT (OUTPATIENT)
Dept: ENDOCRINOLOGY | Facility: CLINIC | Age: 37
End: 2017-08-25

## 2017-08-25 VITALS
SYSTOLIC BLOOD PRESSURE: 132 MMHG | WEIGHT: 206.5 LBS | BODY MASS INDEX: 29.56 KG/M2 | HEART RATE: 79 BPM | HEIGHT: 70 IN | DIASTOLIC BLOOD PRESSURE: 80 MMHG

## 2017-08-25 DIAGNOSIS — C73 PAPILLARY THYROID CARCINOMA (H): Primary | ICD-10-CM

## 2017-08-25 ASSESSMENT — PAIN SCALES - GENERAL: PAINLEVEL: NO PAIN (0)

## 2017-08-25 NOTE — LETTER
8/25/2017       RE: Cricket Chen  [address]  [place] [address]     Dear Colleague,    Thank you for referring your patient, Cricket Chen, to the [facility] ENDOCRINOLOGY at [facility]. Please see a copy of my visit note below.         Endocrinology Note         Cricket is a 36 year old male presents today for follow up post total thyroidectomy    HPI  Cricket Chen is a 36 years old male with hx of post liver and kidney transplant in 5/2016, posttransplant lymphoproliferative disorder, atrial flutter, hypertension who is here for follow up post total thyroidectomy    I saw him first time in March 2017 for hypermetabolic focus in the right lobe of the thyroid with a max SUV of 12.7 on PET that was noted during baseline evaluation for diffuse large B cell lymphoma. Otherwise no suspicious FDG uptake within the head and neck. He also has PET-avid right axillary lymphadenopathy measuring 1.3 cm but no evidence of FDG-avid areas throughout the rest of his body.     At that time, US thyroid showed 1 cm mid/inferior solid right thyroid nodule which likely corresponds to the area of FDG avidity on prior PET CT. Subsequent FNA of that nodule showed positive for PTC.     Because he was on chemotherapy for PTLD treatment, we have postponed thyroid cancer treatment until he is one for cancer treatment. He ultimately went total thyroidectomy on 8/7/2017 by . He is postoperative course was uncomplicated. Pathology showed PTC 0.8 cm in the right lobe with negative margin for carcinoma, 0.2 cm follicular adenoma was also identify in the right lobe. In the left lobe, 0.1 cm of PTC with negative margin and 1.2 cm benign colloid cyst were noted. No lymphovascular invasion was identified.    He was started on levothyroxine 175  g a day. He is doing well. The wound is healing well. He denied difficulty swallowing or breathing. He denied any muscle twitching, muscle  cramping, hand numbness or tingling.    Past Medical History  Past Medical History:   Diagnosis Date     Alcohol abuse     Last drink in Mid-April 2014     Anemia in ESRD (end-stage renal disease) (H)      Anxiety 2008     Atrial flutter (H)      Cirrhosis (H)     S/P liver transplant     Depression      History of blood transfusion      History of transposition of great vessels     atrial switch at age 8 months old     Hypertension      Liver transplant recipient (H)     2016     Papillary thyroid carcinoma (H)      Pneumonia 11-15-14     Renal transplant recipient     2016     Varices, esophageal (H)        Allergies  Allergies   Allergen Reactions     Hydromorphone Itching     Medications  Current Outpatient Prescriptions   Medication Sig Dispense Refill     traMADol (ULTRAM) 50 MG tablet Take 1 tablet (50 mg) by mouth every 8 hours as needed for moderate pain 45 tablet 0     levothyroxine (SYNTHROID/LEVOTHROID) 150 MCG tablet Take 1 tablet (150 mcg) by mouth daily 90 tablet 4     magnesium oxide (MAG-OX) 400 (241.3 MG) MG tablet Take 1 tablet (400 mg) by mouth 2 times daily 120 tablet 11     tacrolimus (PROGRAF - GENERIC EQUIVALENT) 1 MG capsule Take 2 capsules (2 mg) by mouth 2 times daily 120 capsule 11     predniSONE (DELTASONE) 5 MG tablet Take 1 tablet (5 mg) by mouth daily 90 tablet 3     sulfamethoxazole-trimethoprim (BACTRIM/SEPTRA) 400-80 MG per tablet Take 1 tablet by mouth daily 30 tablet 11     traZODone (DESYREL) 50 MG tablet Take 0.5-2 tablets ( mg) by mouth At Bedtime 30 tablet 1     amLODIPine (NORVASC) 10 MG tablet Take 1 tablet (10 mg) by mouth daily 90 tablet 3     lisinopril (PRINIVIL,ZESTRIL) 5 MG tablet Take 1 tablet (5 mg) by mouth daily 90 tablet 3     calcium carbonate (TUMS) 500 MG chewable tablet Take 1 tablet (500 mg) by mouth 3 times daily (Patient not taking: Reported on 8/25/2017) 42 tablet 0     calcitRIOL (ROCALTROL) 0.25 MCG capsule Take 1 capsule (0.25 mcg) by mouth 3  "times daily (Patient not taking: Reported on 8/25/2017) 42 capsule 0     Family History  family history includes Arthritis in his father; Hypertension in his brother, father, mother, sister, and sister. There is no history of Alcohol/Drug or GASTROINTESTINAL DISEASE.   No family hx of thyroid cancer    Social History  Social History   Substance Use Topics     Smoking status: Former Smoker     Packs/day: 0.33     Years: 3.00     Types: Cigarettes     Start date: 8/20/1997     Quit date: 9/21/2000     Smokeless tobacco: Former User     Alcohol use No      Comment: ~10 drinks per day for ten years, quit in April of 2014   quit drinking  Non , no children    ROS  Constitutional: no weight change, good energy, no night sweat  Eyes: no vision change, diplopia or red eyes   Neck: no difficulty swallowing, no choking, no neck pain, no neck swelling  Cardiovascular: no chest pain, palpitations  Respiratory: no dyspnea, cough, shortness of breath or wheezing   GI: no nausea, vomiting, diarrhea or constipation, no abdominal pain   : no change in urine, no dysuria or hematuria  Musculoskeletal: no joint or muscle pain or swelling   Integumentary: no concerning lesions  Neuro: no loss of strength or sensation, no numbness or tingling, no tremor, no dizziness, no headache   Endo: no polyuria or polydipsia, no temperature intolerance   Heme/Lymph: no concerning bumps, no bleeding problems   Allergy: no environmental allergies   Psych: no depression or anxiety     Physical Exam  /80  Pulse 79  Ht 1.778 m (5' 10\")  Wt 93.7 kg (206 lb 8 oz)  BMI 29.63 kg/m2  Body mass index is 29.63 kg/(m^2).  Constitutional: no distress, comfortable, pleasant   Eyes: anicteric, normal extra-ocular movements, no lid lag or retraction  Neck: well healed surgical scar at the upper part of the right neck and lower neck, no discrete nodule  Cardiovascular: regular rate and rhythm, normal S1 and S2, no murmurs  Respiratory: clear to " auscultation, no wheezes or crackles, normal breath sounds   Gastrointestinal:  Surgical scar along right and left costal area, nontender, no hepatomegaly, no masses   Musculoskeletal: no edema   Skin: no jaundice   Neurological: cranial nerves intact, 2+ reflexes at patella , normal gait, no tremor on outstretched hands bilaterally  Psychological: appropriate mood   Lymphatic: no cervical  lymphadenopathy.    RESULTS  PET 2/4/2017  HEAD/NECK:  Hypermetabolic focus in the right lobe of the thyroid with a max SUV of 12.7. Otherwise no suspicious FDG uptake within the head and neck. There is a 3.0 x 2.2 cm postoperative collection in the right lateral  neck anterior to the sternocleidal mastoid muscle, lateral to the right submandibular gland.   No abnormality identified within the mucosal spaces of the neck. Tongue base is normal. No lymphadenopathy within the neck. Limited head CT is within normal limits.    IMPRESSION:   1. Hypermetabolic right axillary lymph node, suspicious for involvement by lymphoproliferative disease.  2. Hypermetabolic lesion in the right thyroid with a max SUV of 12.7. No definite CT correlate identified. Ultrasound of the thyroid is recommended.  3. Indeterminant 2.2 cm area of decreased enhancement within the right lower quadrant transplant kidney. It does not appear to be  masslike. Ultrasound is recommended for further characterization   4. Postsurgical changes of liver and right lower quadrant kidney transplantation.  5. Transposition of great vessels with postsurgical changes of atrial baffle procedure resulting in a large, presumably intentional atrial septal defect.     US thyroid 3/2/2017  Thyroid parenchyma: Homogeneous  The right lobe of the thyroid measures: 1.6 x 1.6 x 4.5 cm   The thyroid isthmus measures: 0.2 cm   The left lobe of the thyroid measures: 1.6 x 1.1 x 4 cm      Right lobe:  Nodule 1:  Nodule measurement: 0.3 x 0.3 x 0.3 cm  Echogenicity: Predominantly  isoechoic  Consistency: Mixed cystic and solid  Calcifications: no  Hypervascular: Minimal peripheral vascularity  Interval growth (>20%): No prior imaging available     Nodule 2:  Nodule measurement: 0.9 x 0.8 x 0.8 cm  Echogenicity: Hypoechoic  Consistency: solid  Calcifications: no  Hypervascular: yes  Interval growth (>20%): No prior imaging available     Isthmus: No nodule     Left Lobe:   Nodule 1:  Nodule measurement: 0.7 x 0.5 x 0.8 cm  Echogenicity: Hypoechoic  Consistency: cystic  Calcifications: no; nondependent echogenic focus with ringdown  artifact most compatible with inspissated colloid.  Hypervascular: no  Interval growth (>20%): No prior imaging available     Impression:  1.  Almost 1 cm mid/inferior solid right thyroid nodule which likely corresponds to the area of FDG avidity on prior PET CT. Consider FNA for further evaluation.  2.  Additional subcentimeter right thyroid nodule and left thyroid colloid cyst are noted    FNA right thyroid nodule 3/16/2017  Thyroid, right #2, ultrasound-guided fine needle aspiration:   Positive for malignancy.   Papillary thyroid carcinoma   Specimen Adequacy: Satisfactory for evaluation.    ASSESSMENT:    Cricket Chen is a 36 years old male with hx of post liver and kidney transplant in 5/2016, lymphoproliferative disorder, atrial flutter, hypertension who is here for follow up PTC s/p total thyroidectomy    1) multifocal micro PTC: he was noted to have thyroid cancer during evaluation of diffuse large B cell lymphoma. He was initially noted for hypermetabolic activity at right thyroid gland with SUV max of 12.7 from PET scan. Subsequent FNA of right thyroid nodule showed positive for thyroid cancer. He underwent total thyroidectomy by  2 weeks ago without complications.  His prognosis is favorable given micro PTC, negative margin and no lymphovascular invasion.  I don't think he needs adjuvant therapy. I will monitor thyroglobulin for now.  We will  plan to check lab, TSH, FT4, Tg, TgAb, calcium, Alb in 6 weeks    2) postoperative hypothyroidism: clinically euthyroid. Will check TFT level 6 weeks after surgery.    PLAN:   - lab for TSH, FT4, Tg, TgAb, calcium, Alb in 6 weeks  - RTC 6 months    Zuleyma Gray MD     Division of Diabetes and Endocrinology  Department of Medicine  [phone]

## 2017-08-25 NOTE — MR AVS SNAPSHOT
After Visit Summary   8/25/2017    Cricket Chen    MRN: [id number]           Patient Information     Date Of Birth          1980        Visit Information        Provider Department      8/25/2017 12:50 PM Zuleyma Gray MD [facility] Endocrinology        Today's Diagnoses     Papillary thyroid carcinoma (H)    -  1      Care Instructions    Please call [facility] in [place] to schedule your labs in 6 weeks - towards the beginning of October.          Follow-ups after your visit        Your next 10 appointments already scheduled     Aug 31, 2017 11:30 AM CDT   [facility] with  [facility]   [facility] Draw ([facility])    [address] [place] [address]   [phone]            Oct 26, 2017  3:00 PM CDT   [facility] Draw with  [facility]   [facility] Draw ([facility])    [address] [place] [address]   [phone]            Oct 26, 2017  3:30 PM CDT   RETURN ONC with Girish Narayanan MD   [facility] Blood and Marrow Transplant ([facility])    [address] [place] [id number]   [phone]            Jan 16, 2018 12:10 PM CST   (Arrive by 11:55 AM)   Return Liver Transplant with Laureen Galvan MD   [facility] Hepatology ([facility])    [address] [place] [address]  [address] [facility] [address]   [phone]            Jan 16, 2018  2:20 PM CST   (Arrive by 1:50 PM)   Return Kidney Transplant with Jake Sidhu MD   [facility] Nephrology ([facility])    [address] [place] [address]  [address] [facility] [address]   [phone]            Feb 09, 2018  1:50 PM CST   (Arrive by 1:35 PM)   RETURN ENDOCRINE with Zuleyma Gray MD   [facility] Endocrinology ([facility])    [address] [place] [address]  "[facility] [address] [phone]              Future tests that were ordered for you today     Open Future Orders        Priority Expected Expires Ordered    TSH Routine 9/29/2017 8/25/2018 8/25/2017    T4 free Routine 9/29/2017 8/25/2018 8/25/2017    Thyroglobulin (Total, antibody & recovery %) Routine 9/29/2017 8/25/2018 8/25/2017            Who to contact     Please call your clinic at [phone] to:    Ask questions about your health    Make or cancel appointments    Discuss your medicines    Learn about your test results    Speak to your doctor   If you have compliments or concerns about an experience at your clinic, or if you wish to file a complaint, please contact [facility] Patient Relations at [phone] or email us at [email or web address]         Additional Information About Your Visit        [employer] Information     [email or web address] gives you secure access to your electronic health record. If you see a primary care provider, you can also send messages to your care team and make appointments. If you have questions, please call your primary care clinic.  If you do not have a primary care provider, please call [phone] and they will assist you.      [employer] is an electronic gateway that provides easy, online access to your medical records. With [employer], you can request a clinic appointment, read your test results, renew a prescription or communicate with your care team.     To access your existing account, please contact your [facility] or call [phone] for assistance.        Care EveryWhere ID     This is your Care EveryWhere ID. This could be used by other organizations to access your [place] medical records  [id number]        Your Vitals Were     Pulse Height BMI (Body Mass Index)             79 1.778 m (5' 10\") 29.63 kg/m2          Blood Pressure from Last 3 Encounters:   08/25/17 132/80   08/07/17 (!) 158/99 "   07/26/17 119/77    Weight from Last 3 Encounters:   08/25/17 93.7 kg (206 lb 8 oz)   08/16/17 91.2 kg (201 lb)   08/07/17 92.4 kg (203 lb 9.9 oz)                 Today's Medication Changes          These changes are accurate as of: 8/25/17  1:11 PM.  If you have any questions, ask your nurse or doctor.               Stop taking these medicines if you haven't already. Please contact your care team if you have questions.     calcitRIOL 0.25 MCG capsule   Commonly known as:  ROCALTROL                    Primary Care Provider    Physician No Ref-Primary       No address on file        Equal Access to Services     [facility] : Ana M Barroso, jaime brito, mary mccann, robina white . So [facility] [phone].    ATENCIÓN: Si habla español, tiene a laura disposición servicios gratuitos de asistencia lingüística. Llame al [phone].    We comply with applicable federal civil rights laws and [place] laws. We do not discriminate on the basis of race, color, national origin, age, disability sex, sexual orientation or gender identity.            Thank you!     Thank you for choosing [facility]  for your care. Our goal is always to provide you with excellent care. Hearing back from our patients is one way we can continue to improve our services. Please take a few minutes to complete the written survey that you may receive in the mail after your visit with us. Thank you!             Your Updated Medication List - Protect others around you: Learn how to safely use, store and throw away your medicines at www.disposemymeds.org.          This list is accurate as of: 8/25/17  1:11 PM.  Always use your most recent med list.                   Brand Name Dispense Instructions for use Diagnosis    amLODIPine 10 MG tablet    NORVASC    90 tablet    Take 1 tablet (10 mg) by mouth daily    HTN (hypertension)       calcium carbonate 500 MG chewable tablet    TUMS    42  tablet    Take 1 tablet (500 mg) by mouth 3 times daily    Papillary thyroid carcinoma (H)       levothyroxine 150 MCG tablet    SYNTHROID/LEVOTHROID    90 tablet    Take 1 tablet (150 mcg) by mouth daily    Papillary thyroid carcinoma (H)       lisinopril 5 MG tablet    PRINIVIL/ZESTRIL    90 tablet    Take 1 tablet (5 mg) by mouth daily    HTN (hypertension)       magnesium oxide 400 (241.3 MG) MG tablet    MAG-OX    120 tablet    Take 1 tablet (400 mg) by mouth 2 times daily    Hypomagnesemia       predniSONE 5 MG tablet    DELTASONE    90 tablet    Take 1 tablet (5 mg) by mouth daily    Liver replaced by transplant (H), S/P kidney transplant       sulfamethoxazole-trimethoprim 400-80 MG per tablet    BACTRIM/SEPTRA    30 tablet    Take 1 tablet by mouth daily    Liver replaced by transplant (H), S/P kidney transplant       tacrolimus 1 MG capsule    GENERIC EQUIVALENT    120 capsule    Take 2 capsules (2 mg) by mouth 2 times daily    Liver replaced by transplant (H), S/P kidney transplant       traMADol 50 MG tablet    ULTRAM    45 tablet    Take 1 tablet (50 mg) by mouth every 8 hours as needed for moderate pain    Chronic pain of left knee, Papillary thyroid carcinoma (H)       traZODone 50 MG tablet    DESYREL    30 tablet    Take 0.5-2 tablets ( mg) by mouth At Bedtime    Other insomnia

## 2017-08-25 NOTE — PROGRESS NOTES
Endocrinology Note         Cricket is a 36 year old male presents today for follow up post total thyroidectomy    HPI  Cricket Chen is a 36 years old male with hx of post liver and kidney transplant in 5/2016, posttransplant lymphoproliferative disorder, atrial flutter, hypertension who is here for follow up post total thyroidectomy    I saw him first time in March 2017 for hypermetabolic focus in the right lobe of the thyroid with a max SUV of 12.7 on PET that was noted during baseline evaluation for diffuse large B cell lymphoma. Otherwise no suspicious FDG uptake within the head and neck. He also has PET-avid right axillary lymphadenopathy measuring 1.3 cm but no evidence of FDG-avid areas throughout the rest of his body.     At that time, US thyroid showed 1 cm mid/inferior solid right thyroid nodule which likely corresponds to the area of FDG avidity on prior PET CT. Subsequent FNA of that nodule showed positive for PTC.     Because he was on chemotherapy for PTLD treatment, we have postponed thyroid cancer treatment until he is one for cancer treatment. He ultimately went total thyroidectomy on 8/7/2017 by . He is postoperative course was uncomplicated. Pathology showed PTC 0.8 cm in the right lobe with negative margin for carcinoma, 0.2 cm follicular adenoma was also identify in the right lobe. In the left lobe, 0.1 cm of PTC with negative margin and 1.2 cm benign colloid cyst were noted. No lymphovascular invasion was identified.    He was started on levothyroxine 175  g a day. He is doing well. The wound is healing well. He denied difficulty swallowing or breathing. He denied any muscle twitching, muscle cramping, hand numbness or tingling.    Past Medical History  Past Medical History:   Diagnosis Date     Alcohol abuse     Last drink in Mid-April 2014     Anemia in ESRD (end-stage renal disease) (H)      Anxiety 2008     Atrial flutter (H)      Cirrhosis (H)     S/P liver transplant      Depression      History of blood transfusion      History of transposition of great vessels     atrial switch at age 8 months old     Hypertension      Liver transplant recipient (H)     2016     Papillary thyroid carcinoma (H)      Pneumonia 11-15-14     Renal transplant recipient     2016     Varices, esophageal (H)        Allergies  Allergies   Allergen Reactions     Hydromorphone Itching     Medications  Current Outpatient Prescriptions   Medication Sig Dispense Refill     traMADol (ULTRAM) 50 MG tablet Take 1 tablet (50 mg) by mouth every 8 hours as needed for moderate pain 45 tablet 0     levothyroxine (SYNTHROID/LEVOTHROID) 150 MCG tablet Take 1 tablet (150 mcg) by mouth daily 90 tablet 4     magnesium oxide (MAG-OX) 400 (241.3 MG) MG tablet Take 1 tablet (400 mg) by mouth 2 times daily 120 tablet 11     tacrolimus (PROGRAF - GENERIC EQUIVALENT) 1 MG capsule Take 2 capsules (2 mg) by mouth 2 times daily 120 capsule 11     predniSONE (DELTASONE) 5 MG tablet Take 1 tablet (5 mg) by mouth daily 90 tablet 3     sulfamethoxazole-trimethoprim (BACTRIM/SEPTRA) 400-80 MG per tablet Take 1 tablet by mouth daily 30 tablet 11     traZODone (DESYREL) 50 MG tablet Take 0.5-2 tablets ( mg) by mouth At Bedtime 30 tablet 1     amLODIPine (NORVASC) 10 MG tablet Take 1 tablet (10 mg) by mouth daily 90 tablet 3     lisinopril (PRINIVIL,ZESTRIL) 5 MG tablet Take 1 tablet (5 mg) by mouth daily 90 tablet 3     calcium carbonate (TUMS) 500 MG chewable tablet Take 1 tablet (500 mg) by mouth 3 times daily (Patient not taking: Reported on 8/25/2017) 42 tablet 0     calcitRIOL (ROCALTROL) 0.25 MCG capsule Take 1 capsule (0.25 mcg) by mouth 3 times daily (Patient not taking: Reported on 8/25/2017) 42 capsule 0     Family History  family history includes Arthritis in his father; Hypertension in his brother, father, mother, sister, and sister. There is no history of Alcohol/Drug or GASTROINTESTINAL DISEASE.   No family hx of thyroid  "cancer    Social History  Social History   Substance Use Topics     Smoking status: Former Smoker     Packs/day: 0.33     Years: 3.00     Types: Cigarettes     Start date: 8/20/1997     Quit date: 9/21/2000     Smokeless tobacco: Former User     Alcohol use No      Comment: ~10 drinks per day for ten years, quit in April of 2014   quit drinking  Non , no children    ROS  Constitutional: no weight change, good energy, no night sweat  Eyes: no vision change, diplopia or red eyes   Neck: no difficulty swallowing, no choking, no neck pain, no neck swelling  Cardiovascular: no chest pain, palpitations  Respiratory: no dyspnea, cough, shortness of breath or wheezing   GI: no nausea, vomiting, diarrhea or constipation, no abdominal pain   : no change in urine, no dysuria or hematuria  Musculoskeletal: no joint or muscle pain or swelling   Integumentary: no concerning lesions  Neuro: no loss of strength or sensation, no numbness or tingling, no tremor, no dizziness, no headache   Endo: no polyuria or polydipsia, no temperature intolerance   Heme/Lymph: no concerning bumps, no bleeding problems   Allergy: no environmental allergies   Psych: no depression or anxiety     Physical Exam  /80  Pulse 79  Ht 1.778 m (5' 10\")  Wt 93.7 kg (206 lb 8 oz)  BMI 29.63 kg/m2  Body mass index is 29.63 kg/(m^2).  Constitutional: no distress, comfortable, pleasant   Eyes: anicteric, normal extra-ocular movements, no lid lag or retraction  Neck: well healed surgical scar at the upper part of the right neck and lower neck, no discrete nodule  Cardiovascular: regular rate and rhythm, normal S1 and S2, no murmurs  Respiratory: clear to auscultation, no wheezes or crackles, normal breath sounds   Gastrointestinal:  Surgical scar along right and left costal area, nontender, no hepatomegaly, no masses   Musculoskeletal: no edema   Skin: no jaundice   Neurological: cranial nerves intact, 2+ reflexes at patella , normal gait, no " tremor on outstretched hands bilaterally  Psychological: appropriate mood   Lymphatic: no cervical  lymphadenopathy.    RESULTS  PET 2/4/2017  HEAD/NECK:  Hypermetabolic focus in the right lobe of the thyroid with a max SUV of 12.7. Otherwise no suspicious FDG uptake within the head and neck. There is a 3.0 x 2.2 cm postoperative collection in the right lateral  neck anterior to the sternocleidal mastoid muscle, lateral to the right submandibular gland.   No abnormality identified within the mucosal spaces of the neck. Tongue base is normal. No lymphadenopathy within the neck. Limited head CT is within normal limits.    IMPRESSION:   1. Hypermetabolic right axillary lymph node, suspicious for involvement by lymphoproliferative disease.  2. Hypermetabolic lesion in the right thyroid with a max SUV of 12.7. No definite CT correlate identified. Ultrasound of the thyroid is recommended.  3. Indeterminant 2.2 cm area of decreased enhancement within the right lower quadrant transplant kidney. It does not appear to be  masslike. Ultrasound is recommended for further characterization   4. Postsurgical changes of liver and right lower quadrant kidney transplantation.  5. Transposition of great vessels with postsurgical changes of atrial baffle procedure resulting in a large, presumably intentional atrial septal defect.     US thyroid 3/2/2017  Thyroid parenchyma: Homogeneous  The right lobe of the thyroid measures: 1.6 x 1.6 x 4.5 cm   The thyroid isthmus measures: 0.2 cm   The left lobe of the thyroid measures: 1.6 x 1.1 x 4 cm      Right lobe:  Nodule 1:  Nodule measurement: 0.3 x 0.3 x 0.3 cm  Echogenicity: Predominantly isoechoic  Consistency: Mixed cystic and solid  Calcifications: no  Hypervascular: Minimal peripheral vascularity  Interval growth (>20%): No prior imaging available     Nodule 2:  Nodule measurement: 0.9 x 0.8 x 0.8 cm  Echogenicity: Hypoechoic  Consistency: solid  Calcifications: no  Hypervascular:  yes  Interval growth (>20%): No prior imaging available     Isthmus: No nodule     Left Lobe:   Nodule 1:  Nodule measurement: 0.7 x 0.5 x 0.8 cm  Echogenicity: Hypoechoic  Consistency: cystic  Calcifications: no; nondependent echogenic focus with ringdown  artifact most compatible with inspissated colloid.  Hypervascular: no  Interval growth (>20%): No prior imaging available     Impression:  1.  Almost 1 cm mid/inferior solid right thyroid nodule which likely corresponds to the area of FDG avidity on prior PET CT. Consider FNA for further evaluation.  2.  Additional subcentimeter right thyroid nodule and left thyroid colloid cyst are noted    FNA right thyroid nodule 3/16/2017  Thyroid, right #2, ultrasound-guided fine needle aspiration:   Positive for malignancy.   Papillary thyroid carcinoma   Specimen Adequacy: Satisfactory for evaluation.    Surgical pathology 8/7/17  FINAL DIAGNOSIS:   A- Thyroid, right, lobectomy:   - Papillary thyroid microcarcinoma: 0.8 cm in greatest dimension   - Margins negative for carcinoma   - Perineural and vascular invasion not identified.   - Follicular adenoma, 0.2 cm in greatest dimension   - See tumor synopsis for details     B- Thyroid, left, lobectomy:   - Papillary thyroid microcarcinoma: 0.1 cm in greatest dimension   - Margins negative for carcinoma   - Perineural and vascular invasion not identified.   - Benign colloid cyst: 1.2 cm in greatest dimension   - See tumor synopsis for details     Report Name: Thyroid Gland - Resection   Status: Submitted     Part(s) Involved:   A: Thyroid, right     Synoptic Report:     SPECIMEN     Procedure:         - Total thyroidectomy     TUMOR     Histologic Type:         - Papillary carcinoma       Common Significant Variants:           - Classical (usual, conventional)     Tumor Size: 0.8 cm     Tumor Laterality:         - Right lobe         - Left lobe     Tumor Focality:         - Multifocal     Tumor Extent       Extrathyroidal  Extension:           - Not identified     Accessory Tumor Findings       Angioinvasion (vascular invasion):           - Not identified       Lymphatic Invasion:           - Not identified       Perineural Invasion:           - Not identified     MARGINS     Margins:         - Margins uninvolved by carcinoma     LYMPH NODES     Regional Lymph Nodes:         - No nodes submitted or found     STAGE (PTNM)     TNM Descriptors:         - m (multiple primary tumors)     Primary Tumor (pT):         - pT1a:  Tumor 1 cm or less in greatest dimension limited to the   thyroid.     Regional Lymph Nodes (pN):         - pNX: Regional lymph nodes cannot be assessed     ADDITIONAL FINDINGS     Additional Pathologic Findings:         - Adenoma     ASSESSMENT:    Cricket Chen is a 36 years old male with hx of post liver and kidney transplant in 5/2016, lymphoproliferative disorder, atrial flutter, hypertension who is here for follow up PTC s/p total thyroidectomy    1) multifocal micro PTC: he was noted to have thyroid cancer during evaluation of diffuse large B cell lymphoma. He was initially noted for hypermetabolic activity at right thyroid gland with SUV max of 12.7 from PET scan. Subsequent FNA of right thyroid nodule showed positive for thyroid cancer. He underwent total thyroidectomy by  2 weeks ago without complications.  His prognosis is favorable given micro PTC, negative margin and no lymphovascular invasion.  I don't think he needs adjuvant therapy. I will monitor thyroglobulin for now.  We will plan to check lab, TSH, FT4, Tg, TgAb, calcium, Alb in 6 weeks    2) postoperative hypothyroidism: clinically euthyroid. Will check TFT level 6 weeks after surgery.    PLAN:   - lab for TSH, FT4, Tg, TgAb, calcium, Alb in 6 weeks  - RTC 6 months    Zuleyma Gray MD     Division of Diabetes and Endocrinology  Department of Medicine  [phone]

## 2017-08-31 PROCEDURE — 25000128 H RX IP 250 OP 636: Performed by: NURSE PRACTITIONER

## 2017-08-31 PROCEDURE — 96523 IRRIG DRUG DELIVERY DEVICE: CPT

## 2017-08-31 RX ORDER — HEPARIN SODIUM (PORCINE) LOCK FLUSH IV SOLN 100 UNIT/ML 100 UNIT/ML
5 SOLUTION INTRAVENOUS DAILY PRN
Status: DISCONTINUED | OUTPATIENT
Start: 2017-08-31 | End: 2017-09-08 | Stop reason: HOSPADM

## 2017-08-31 RX ADMIN — SODIUM CHLORIDE, PRESERVATIVE FREE 5 ML: 5 INJECTION INTRAVENOUS at 12:08

## 2017-08-31 NOTE — NURSING NOTE
Pt here for port flush only, no labs needed. Port accessed by RN, line flushed with NS and heparin and de-accessed immediately. Chiquita Arango

## 2017-09-05 DIAGNOSIS — I10 HTN (HYPERTENSION): ICD-10-CM

## 2017-09-05 RX ORDER — LISINOPRIL 5 MG/1
TABLET ORAL
Qty: 90 TABLET | Refills: 3 | Status: SHIPPED | OUTPATIENT
Start: 2017-09-05 | End: 2018-08-24

## 2017-09-05 RX ORDER — AMLODIPINE BESYLATE 10 MG/1
TABLET ORAL
Qty: 90 TABLET | Refills: 3 | Status: SHIPPED | OUTPATIENT
Start: 2017-09-05 | End: 2018-08-24

## 2017-09-27 DIAGNOSIS — C73 PAPILLARY THYROID CARCINOMA (H): ICD-10-CM

## 2017-09-27 DIAGNOSIS — Z94.0 S/P KIDNEY TRANSPLANT: ICD-10-CM

## 2017-09-27 DIAGNOSIS — Z94.4 LIVER REPLACED BY TRANSPLANT (H): ICD-10-CM

## 2017-09-27 LAB
ALBUMIN SERPL-MCNC: 4 G/DL (ref 3.4–5)
CALCIUM SERPL-MCNC: 9 MG/DL (ref 8.5–10.1)
T4 FREE SERPL-MCNC: 1.17 NG/DL (ref 0.76–1.46)
TACROLIMUS BLD-MCNC: 4.9 UG/L (ref 5–15)
TME LAST DOSE: ABNORMAL H
TSH SERPL DL<=0.005 MIU/L-ACNC: 0.13 MU/L (ref 0.4–4)

## 2017-09-27 PROCEDURE — 84432 ASSAY OF THYROGLOBULIN: CPT | Mod: 90 | Performed by: INTERNAL MEDICINE

## 2017-09-27 PROCEDURE — 99000 SPECIMEN HANDLING OFFICE-LAB: CPT | Performed by: INTERNAL MEDICINE

## 2017-09-27 PROCEDURE — 36415 COLL VENOUS BLD VENIPUNCTURE: CPT | Performed by: INTERNAL MEDICINE

## 2017-09-27 PROCEDURE — 84439 ASSAY OF FREE THYROXINE: CPT | Performed by: INTERNAL MEDICINE

## 2017-09-27 PROCEDURE — 80197 ASSAY OF TACROLIMUS: CPT | Performed by: INTERNAL MEDICINE

## 2017-09-27 PROCEDURE — 82040 ASSAY OF SERUM ALBUMIN: CPT | Performed by: INTERNAL MEDICINE

## 2017-09-27 PROCEDURE — 84443 ASSAY THYROID STIM HORMONE: CPT | Performed by: INTERNAL MEDICINE

## 2017-09-27 PROCEDURE — 86800 THYROGLOBULIN ANTIBODY: CPT | Mod: 90 | Performed by: INTERNAL MEDICINE

## 2017-09-27 PROCEDURE — 82310 ASSAY OF CALCIUM: CPT | Performed by: INTERNAL MEDICINE

## 2017-10-04 LAB — LAB SCANNED RESULT: NORMAL

## 2017-10-12 ENCOUNTER — OFFICE VISIT (OUTPATIENT)
Dept: VASCULAR SURGERY | Facility: CLINIC | Age: 37
End: 2017-10-12

## 2017-10-12 VITALS
DIASTOLIC BLOOD PRESSURE: 77 MMHG | RESPIRATION RATE: 16 BRPM | HEART RATE: 80 BPM | OXYGEN SATURATION: 97 % | SYSTOLIC BLOOD PRESSURE: 128 MMHG

## 2017-10-12 DIAGNOSIS — T82.898A STEAL SYNDROME AS COMPLICATION OF DIALYSIS ACCESS, INITIAL ENCOUNTER (H): Primary | ICD-10-CM

## 2017-10-12 ASSESSMENT — PAIN SCALES - GENERAL: PAINLEVEL: MODERATE PAIN (5)

## 2017-10-12 NOTE — NURSING NOTE
Chief Complaint   Patient presents with     RECHECK     follow up ligation right AV fistula, discuss removal        Vitals:    10/12/17 1425   BP: 128/77   BP Location: Left arm   Pulse: 80   Resp: 16   SpO2: 97%       There is no height or weight on file to calculate BMI.                 Kaylan Corado LPN

## 2017-10-12 NOTE — PROGRESS NOTES
VASCULAR SURGERY CLINIC ESTABLISHED PATIENT NOTE    HPI:    Cricket Chen is a 37 year old male with past medical history of liver and kidney transplant in 2016, HTN, anxiety, large B call lymphoma status post chemotherapy and thyroidectomy who is being seen in clinic today regarding right arm fistula ligation.     SUBJECTIVE:  Cricket reports right arm pain wakes him up in the night.  He develops right hand numbness and pain when using his cell phone, cooking and cleaning.  He reports his right arm pain and numbness greatly affects his quality of life.    OBJECTIVE:  Vital signs:  128/77  80  16      Prior to Admission medications    Medication Sig Start Date End Date Taking? Authorizing Provider   lisinopril (PRINIVIL/ZESTRIL) 5 MG tablet TAKE ONE TABLET BY MOUTH EVERY DAY 9/5/17  Yes Jake Sidhu MD   amLODIPine (NORVASC) 10 MG tablet TAKE ONE TABLET BY MOUTH EVERY DAY 9/5/17  Yes Jake Sidhu MD   traMADol (ULTRAM) 50 MG tablet Take 1 tablet (50 mg) by mouth every 8 hours as needed for moderate pain 8/7/17  Yes Paula Hughes MD   levothyroxine (SYNTHROID/LEVOTHROID) 150 MCG tablet Take 1 tablet (150 mcg) by mouth daily 8/7/17  Yes Paula Hughes MD   magnesium oxide (MAG-OX) 400 (241.3 MG) MG tablet Take 1 tablet (400 mg) by mouth 2 times daily 7/21/17  Yes Laureen Galvan MD   tacrolimus (PROGRAF - GENERIC EQUIVALENT) 1 MG capsule Take 2 capsules (2 mg) by mouth 2 times daily 6/21/17  Yes Laureen Galvan MD   predniSONE (DELTASONE) 5 MG tablet Take 1 tablet (5 mg) by mouth daily 6/16/17  Yes Jake Sidhu MD   sulfamethoxazole-trimethoprim (BACTRIM/SEPTRA) 400-80 MG per tablet Take 1 tablet by mouth daily 5/2/17  Yes Laureen Galvan MD   traZODone (DESYREL) 50 MG tablet Take 0.5-2 tablets ( mg) by mouth At Bedtime 4/26/17  Yes Krystina Durham PA-C       PHYSICAL EXAM:  NEURO/PSYCH: The patient is alert  and oriented.  Appropriate.  Moves all extremities.   SKIN: Color appropriate for race, warm, dry.  PULMONARY: non-labored breathing   EXTREMITIES: right arm fistula, + thrill, palpable right radial pulse, right hand warm       ASSESSMENT:  Patient Active Problem List   Diagnosis     Atrial flutter (H)     Complete transposition of great vessels     Esophageal varices (H)     Insomnia     Alcoholic hepatitis     History of alcohol abuse     History of transposition of great vessels     Depression     Thrombocytopenia (H)     Pain medication agreement     Congenital anomaly of heart     Liver replaced by transplant (H)     Kidney replaced by transplant     Immunosuppressed status (H)     Hypomagnesemia     Secondary renal hyperparathyroidism (H)     Chronic pain syndrome     Pain management contract signed     Aftercare following organ transplant     Post-transplant lymphoproliferative disorder (H)     Papillary thyroid carcinoma (H)     Abnormal liver function tests     Advance directive discussed with patient     Acute alcoholic liver disease     Alcohol dependence (H)     Encounter for palliative care         PLAN:  - patient experiencing right arm steal syndrome   - plan to ligate his right arm fistula in the next few weeks    Please do not hesitate to contact Dr. Eaton's vascular surgery team with any questions.     Emi STEARNS, CNS  Division of Vascular Surgery  [facility]  Pager [phone]

## 2017-10-12 NOTE — LETTER
10/12/2017       RE: Cricket Chen  [address]  [facility] [address]     Dear Colleague,    Thank you for referring your patient, Cricket Chen, to the [facility] VASCULAR CLINIC at [facility]. Please see a copy of my visit note below.    VASCULAR SURGERY CLINIC ESTABLISHED PATIENT NOTE    HPI:    Cricket Chen is a 37 year old male with past medical history of liver and kidney transplant in 2016, HTN, anxiety, large B call lymphoma status post chemotherapy and thyroidectomy who is being seen in clinic today regarding right arm fistula ligation.     SUBJECTIVE:  Cricket reports right arm pain wakes him up in the night.  He develops right hand numbness and pain when using his cell phone, cooking and cleaning.  He reports his right arm pain and numbness greatly affects his quality of life.    OBJECTIVE:  Vital signs:  128/77  80  16      Prior to Admission medications    Medication Sig Start Date End Date Taking? Authorizing Provider   lisinopril (PRINIVIL/ZESTRIL) 5 MG tablet TAKE ONE TABLET BY MOUTH EVERY DAY 9/5/17  Yes Jake Sidhu MD   amLODIPine (NORVASC) 10 MG tablet TAKE ONE TABLET BY MOUTH EVERY DAY 9/5/17  Yes Jake Sidhu MD   traMADol (ULTRAM) 50 MG tablet Take 1 tablet (50 mg) by mouth every 8 hours as needed for moderate pain 8/7/17  Yes Paula Hughes MD   levothyroxine (SYNTHROID/LEVOTHROID) 150 MCG tablet Take 1 tablet (150 mcg) by mouth daily 8/7/17  Yes Paula Huhges MD   magnesium oxide (MAG-OX) 400 (241.3 MG) MG tablet Take 1 tablet (400 mg) by mouth 2 times daily 7/21/17  Yes Laureen Galvan MD   tacrolimus (PROGRAF - GENERIC EQUIVALENT) 1 MG capsule Take 2 capsules (2 mg) by mouth 2 times daily 6/21/17  Yes Laureen Galvan MD   predniSONE (DELTASONE) 5 MG tablet Take 1 tablet (5 mg) by mouth daily 6/16/17  Yes Jake Sidhu MD   sulfamethoxazole-trimethoprim  (BACTRIM/SEPTRA) 400-80 MG per tablet Take 1 tablet by mouth daily 5/2/17  Yes Laureen Galvan MD   traZODone (DESYREL) 50 MG tablet Take 0.5-2 tablets ( mg) by mouth At Bedtime 4/26/17  Yes Krystina Durham PA-C       PHYSICAL EXAM:  NEURO/PSYCH: The patient is alert and oriented.  Appropriate.  Moves all extremities.   SKIN: Color appropriate for race, warm, dry.  PULMONARY: non-labored breathing   EXTREMITIES: right arm fistula, + thrill, palpable right radial pulse, right hand warm       ASSESSMENT:  Patient Active Problem List   Diagnosis     Atrial flutter (H)     Complete transposition of great vessels     Esophageal varices (H)     Insomnia     Alcoholic hepatitis     History of alcohol abuse     History of transposition of great vessels     Depression     Thrombocytopenia (H)     Pain medication agreement     Congenital anomaly of heart     Liver replaced by transplant (H)     Kidney replaced by transplant     Immunosuppressed status (H)     Hypomagnesemia     Secondary renal hyperparathyroidism (H)     Chronic pain syndrome     Pain management contract signed     Aftercare following organ transplant     Post-transplant lymphoproliferative disorder (H)     Papillary thyroid carcinoma (H)     Abnormal liver function tests     Advance directive discussed with patient     Acute alcoholic liver disease     Alcohol dependence (H)     Encounter for palliative care         PLAN:  - patient experiencing right arm steal syndrome   - plan to ligate his right arm fistula in the next few weeks    Please do not hesitate to contact Dr. Eaton's vascular surgery team with any questions.     Emi STEARNS, CNS  Division of Vascular Surgery  [facility]  Pager [phone]      I have seen and assessed this patient with the above provider and agree with her above documentation, impression and plan.In summary this patient has significant symptoms from his fistula.  It is of no benefit  to him currently. In this context, fistula ligation is appropriate.  Discussed risk of thrombophlebitis sx with ligation and plan to limit by compressing fistula post-operatively.  Surgery to be scheduled.       I spent>50% of this 30 min visit in counseling.     Sincerely,    Padmaja Eaton MD

## 2017-10-12 NOTE — MR AVS SNAPSHOT
After Visit Summary   10/12/2017    Cricket Chen    MRN: [id number]           Patient Information     Date Of Birth          1980        Visit Information        Provider Department      10/12/2017 2:30 PM Padmaja Eaton MD [facility]        Care Instructions    Right arm fistula ligation with Dr. Eaton in the next 4-8 weeks.  Beth will contact you to schedule    With questions, concerns, or to request an appointment, please call either:    Beth Mcclelland, Care Coordinator RN, Vascular Surgery  [phone]    Vascular Call Center  [phone]    To contact someone after 5 pm, on a weekend, or on a Holiday, please call:  [facility]  [phone], option 4 to have a member of the Vascular Surgery Service paged.          Follow-ups after your visit        Your next 10 appointments already scheduled     Oct 26, 2017  3:00 PM CDT   [facility] Draw with  [facility]   [facility] Lab Draw ([facility])    [address] [place] [id number] [phone]            Oct 26, 2017  3:30 PM CDT   RETURN ONC with Girish Narayanan MD   [facility] Blood and Marrow Transplant ([facility] [place])    [address] [place] [address] [id number] [phone]            Jan 16, 2018 12:10 PM CST   (Arrive by 11:55 AM)   Return Liver Transplant with Laureen Galvan MD   [facility] Hepatology ([facility])    [address] [place] [address] [id number] [phone]            Jan 16, 2018  2:20 PM CST   (Arrive by 1:50 PM)   Return Kidney Transplant with Jake Sidhu MD   [facility] Nephrology ([facility])    [address] [place] [address] [id number] [phone]            Feb 09, 2018  1:50 PM CST   (Arrive by 1:35 PM)   RETURN ENDOCRINE with Zuleyma Gray MD     [facility] ([facility] [place])    [address] [facility] [address] [phone]              Who to contact     Please call your clinic at [phone] to:    Ask questions about your health    Make or cancel appointments    Discuss your medicines    Learn about your test results    Speak to your doctor   If you have compliments or concerns about an experience at your clinic, or if you wish to file a complaint, please contact [facility] Patient Relations at [phone] or email us at [email or web address]         Additional Information About Your Visit        Any[employer] Information     Any[employer] gives you secure access to your electronic health record. If you see a primary care provider, you can also send messages to your care team and make appointments. If you have questions, please call your primary care clinic.  If you do not have a primary care provider, please call [phone] and they will assist you.      Any[employer] is an electronic gateway that provides easy, online access to your medical records. With Any[employer], you can request a clinic appointment, read your test results, renew a prescription or communicate with your care team.     To access your existing account, please contact your [facility] or call [phone] for assistance.        Care EveryWhere ID     This is your Care EveryWhere ID. This could be used by other organizations to access your [facility] medical records  [id number]        Your Vitals Were     Pulse Respirations Pulse Oximetry             80 16 97%          Blood Pressure from Last 3 Encounters:   10/12/17 128/77   08/25/17 132/80   08/07/17 (!) 158/99    Weight from Last 3 Encounters:   08/25/17 206 lb 8 oz   08/16/17 201 lb   08/07/17 203 lb 9.9 oz              Today, you had the following     No orders found for display       Primary Care Provider    Physician No  Ref-Primary       NO REF-PRIMARY PHYSICIAN        Equal Access to Services     CLAY VIDES : Hadii steffany jurado yuanjon Thereseali, waraoulda lunardaperezha, qaybta analirajwinderrobina aguirre. So [facility] [phone].    ATENCIÓN: Si habla español, tiene a laura disposición servicios gratuitos de asistencia lingüística. Jaelyn al [phone].    We comply with applicable federal civil rights laws and [place] laws. We do not discriminate on the basis of race, color, national origin, age, disability, sex, sexual orientation, or gender identity.            Thank you!     Thank you for choosing [facility] VASCULAR CLINIC  for your care. Our goal is always to provide you with excellent care. Hearing back from our patients is one way we can continue to improve our services. Please take a few minutes to complete the written survey that you may receive in the mail after your visit with us. Thank you!             Your Updated Medication List - Protect others around you: Learn how to safely use, store and throw away your medicines at www.disposemymeds.org.          This list is accurate as of: 10/12/17  2:50 PM.  Always use your most recent med list.                   Brand Name Dispense Instructions for use Diagnosis    amLODIPine 10 MG tablet    NORVASC    90 tablet    TAKE ONE TABLET BY MOUTH EVERY DAY    HTN (hypertension)       levothyroxine 150 MCG tablet    SYNTHROID/LEVOTHROID    90 tablet    Take 1 tablet (150 mcg) by mouth daily    Papillary thyroid carcinoma (H)       lisinopril 5 MG tablet    PRINIVIL/ZESTRIL    90 tablet    TAKE ONE TABLET BY MOUTH EVERY DAY    HTN (hypertension)       magnesium oxide 400 (241.3 MG) MG tablet    MAG-OX    120 tablet    Take 1 tablet (400 mg) by mouth 2 times daily    Hypomagnesemia       predniSONE 5 MG tablet    DELTASONE    90 tablet    Take 1 tablet (5 mg) by mouth daily    Liver replaced by transplant (H), S/P kidney transplant       sulfamethoxazole-trimethoprim  400-80 MG per tablet    BACTRIM/SEPTRA    30 tablet    Take 1 tablet by mouth daily    Liver replaced by transplant (H), S/P kidney transplant       tacrolimus 1 MG capsule    GENERIC EQUIVALENT    120 capsule    Take 2 capsules (2 mg) by mouth 2 times daily    Liver replaced by transplant (H), S/P kidney transplant       traMADol 50 MG tablet    ULTRAM    45 tablet    Take 1 tablet (50 mg) by mouth every 8 hours as needed for moderate pain    Chronic pain of left knee, Papillary thyroid carcinoma (H)       traZODone 50 MG tablet    DESYREL    30 tablet    Take 0.5-2 tablets ( mg) by mouth At Bedtime    Other insomnia

## 2017-10-12 NOTE — PATIENT INSTRUCTIONS
Right arm fistula ligation with Dr. Eaton in the next 4-8 weeks.  Beth will contact you to schedule    With questions, concerns, or to request an appointment, please call either:    Beth Mcclelland, Care Coordinator RN, Vascular Surgery  [phone]    Vascular Call Center  [phone]    To contact someone after 5 pm, on a weekend, or on a Holiday, please call:  [facility]  [phone], option 4 to have a member of the Vascular Surgery Service paged.

## 2017-10-19 NOTE — PROGRESS NOTES
I have seen and assessed this patient with the above provider and agree with her above documentation, impression and plan.In summary this patient has significant symptoms from his fistula.  It is of no benefit to him currently. In this context, fistula ligation is appropriate.  Discussed risk of thrombophlebitis sx with ligation and plan to limit by compressing fistula post-operatively.  Surgery to be scheduled.       I spent>50% of this 30 min visit in counseling.  Answers for HPI/ROS submitted by the patient on 10/1/2017   General Symptoms: No  Skin Symptoms: No  HENT Symptoms: No  EYE SYMPTOMS: No  HEART SYMPTOMS: No  LUNG SYMPTOMS: No  INTESTINAL SYMPTOMS: No  URINARY SYMPTOMS: No  REPRODUCTIVE SYMPTOMS: No  SKELETAL SYMPTOMS: No  BLOOD SYMPTOMS: No  NERVOUS SYSTEM SYMPTOMS: No  MENTAL HEALTH SYMPTOMS: No

## 2017-10-26 ENCOUNTER — OFFICE VISIT (OUTPATIENT)
Dept: TRANSPLANT | Facility: CLINIC | Age: 37
End: 2017-10-26
Attending: INTERNAL MEDICINE
Payer: COMMERCIAL

## 2017-10-26 ENCOUNTER — APPOINTMENT (OUTPATIENT)
Dept: LAB | Facility: CLINIC | Age: 37
End: 2017-10-26
Attending: INTERNAL MEDICINE
Payer: COMMERCIAL

## 2017-10-26 VITALS
DIASTOLIC BLOOD PRESSURE: 72 MMHG | TEMPERATURE: 98.4 F | RESPIRATION RATE: 16 BRPM | SYSTOLIC BLOOD PRESSURE: 136 MMHG | BODY MASS INDEX: 30.48 KG/M2 | HEART RATE: 75 BPM | WEIGHT: 212.4 LBS | OXYGEN SATURATION: 99 %

## 2017-10-26 DIAGNOSIS — D47.Z1 PTLD (POST-TRANSPLANT LYMPHOPROLIFERATIVE DISORDER) (H): Primary | ICD-10-CM

## 2017-10-26 DIAGNOSIS — Z94.4 LIVER REPLACED BY TRANSPLANT (H): ICD-10-CM

## 2017-10-26 LAB
ALBUMIN SERPL-MCNC: 4.3 G/DL (ref 3.4–5)
ALP SERPL-CCNC: 71 U/L (ref 40–150)
ALT SERPL W P-5'-P-CCNC: 30 U/L (ref 0–70)
ANION GAP SERPL CALCULATED.3IONS-SCNC: 7 MMOL/L (ref 3–14)
AST SERPL W P-5'-P-CCNC: 16 U/L (ref 0–45)
BILIRUB DIRECT SERPL-MCNC: 0.1 MG/DL (ref 0–0.2)
BILIRUB SERPL-MCNC: 0.5 MG/DL (ref 0.2–1.3)
BUN SERPL-MCNC: 22 MG/DL (ref 7–30)
CALCIUM SERPL-MCNC: 9 MG/DL (ref 8.5–10.1)
CHLORIDE SERPL-SCNC: 106 MMOL/L (ref 94–109)
CO2 SERPL-SCNC: 22 MMOL/L (ref 20–32)
CREAT SERPL-MCNC: 1.2 MG/DL (ref 0.66–1.25)
ERYTHROCYTE [DISTWIDTH] IN BLOOD BY AUTOMATED COUNT: 12.3 % (ref 10–15)
GFR SERPL CREATININE-BSD FRML MDRD: 68 ML/MIN/1.7M2
GLUCOSE SERPL-MCNC: 98 MG/DL (ref 70–99)
HCT VFR BLD AUTO: 43.4 % (ref 40–53)
HGB BLD-MCNC: 14.6 G/DL (ref 13.3–17.7)
MAGNESIUM SERPL-MCNC: 2.1 MG/DL (ref 1.6–2.3)
MCH RBC QN AUTO: 29.6 PG (ref 26.5–33)
MCHC RBC AUTO-ENTMCNC: 33.6 G/DL (ref 31.5–36.5)
MCV RBC AUTO: 88 FL (ref 78–100)
PLATELET # BLD AUTO: 123 10E9/L (ref 150–450)
POTASSIUM SERPL-SCNC: 4.5 MMOL/L (ref 3.4–5.3)
PROT SERPL-MCNC: 7.3 G/DL (ref 6.8–8.8)
RBC # BLD AUTO: 4.94 10E12/L (ref 4.4–5.9)
SODIUM SERPL-SCNC: 136 MMOL/L (ref 133–144)
WBC # BLD AUTO: 3.1 10E9/L (ref 4–11)

## 2017-10-26 PROCEDURE — 99212 OFFICE O/P EST SF 10 MIN: CPT | Mod: ZF

## 2017-10-26 PROCEDURE — 85027 COMPLETE CBC AUTOMATED: CPT | Performed by: INTERNAL MEDICINE

## 2017-10-26 PROCEDURE — 36591 DRAW BLOOD OFF VENOUS DEVICE: CPT

## 2017-10-26 PROCEDURE — 83735 ASSAY OF MAGNESIUM: CPT | Performed by: INTERNAL MEDICINE

## 2017-10-26 PROCEDURE — 25000128 H RX IP 250 OP 636: Mod: ZF | Performed by: INTERNAL MEDICINE

## 2017-10-26 PROCEDURE — 80076 HEPATIC FUNCTION PANEL: CPT | Performed by: INTERNAL MEDICINE

## 2017-10-26 PROCEDURE — 80048 BASIC METABOLIC PNL TOTAL CA: CPT | Performed by: INTERNAL MEDICINE

## 2017-10-26 RX ORDER — HEPARIN SODIUM (PORCINE) LOCK FLUSH IV SOLN 100 UNIT/ML 100 UNIT/ML
5 SOLUTION INTRAVENOUS
Status: COMPLETED | OUTPATIENT
Start: 2017-10-26 | End: 2017-10-26

## 2017-10-26 RX ADMIN — SODIUM CHLORIDE, PRESERVATIVE FREE 5 ML: 5 INJECTION INTRAVENOUS at 15:30

## 2017-10-26 ASSESSMENT — PAIN SCALES - GENERAL: PAINLEVEL: NO PAIN (0)

## 2017-10-26 NOTE — NURSING NOTE
"Oncology Rooming Note    October 26, 2017 3:34 PM   Cricket Chen is a 37 year old male who presents for:    Chief Complaint   Patient presents with     Port Draw     port accessed and labs drawn by rn.  vs taken.     Oncology Clinic Visit     Initial Vitals: /72 (BP Location: Right arm, Patient Position: Sitting, Cuff Size: Adult Regular)  Pulse 75  Temp 98.4  F (36.9  C) (Oral)  Resp 16  Wt 96.3 kg (212 lb 6.4 oz)  SpO2 99%  BMI 30.48 kg/m2 Estimated body mass index is 30.48 kg/(m^2) as calculated from the following:    Height as of 8/25/17: 1.778 m (5' 10\").    Weight as of this encounter: 96.3 kg (212 lb 6.4 oz). Body surface area is 2.18 meters squared.  No Pain (0) Comment: Data Unavailable   No LMP for male patient.  Allergies reviewed: Yes  Medications reviewed: Yes    Medications: Medication refills not needed today.  Pharmacy name entered into [employer]:    [facility] - [place], [place] - [id number]  [facility] - [place], [place] - [address] [address]     Clinical concerns:      7 minutes for nursing intake (face to face time)     Miles Cordova              "

## 2017-10-26 NOTE — LETTER
10/26/2017       RE: Cricket Chen  [address]  [facility] [address]     Dear Colleague,    Thank you for referring your patient, Cricket Chen, to the [facility] BLOOD AND MARROW TRANSPLANT at [facility]. Please see a copy of my visit note below.    REASON FOR VISIT:  Followup for history of PTLD, status post 4 weekly Rituxan and 4 cycles of R-CEOP presenting for the follow up.      HISTORY OF PRESENT ILLNESS/REVIEW OF SYSTEMS:  Mr. Chen is a very pleasant 37 year old gentleman, with a prior history as outlined above, who was diagnosed with PTLD early this year based on an excisional lymph node biopsy from 02/01/2017 (right neck biopsy of partial necrotic mass).  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  CellCept was reduced from 1500 b.i.d. to 750 twice a day, and  tacrolimus 3 mg twice a day was decreased to 2 mg twice a day. He has tolerated this  RSST very well. He underwent end of Rx PET/CT scan and had achieved CR1.     Of note, his diagnostic PET/CT scan identified a thyroid nodule, which was consistent with papillary thyroid carcinoma based on the ultrasound-guided fine needle aspiration. He underwent thyroidectomy.    Since last visit with me  He denies any F/C/NS. No pain, N/V/D. He is not aware of any lumps across his body.  No appetite or weight loss.  Gained weight.      REVIEW OF SYMPTOMS:   He denies any chest pain or dyspnea on exertion. No LE edema.      The rest of 12 points of ROS were reviewed and found to be negative, unless as mentioned above.      PHYSICAL EXAMINATION:   /72 (BP Location: Right arm, Patient Position: Sitting, Cuff Size: Adult Regular)  Pulse 75  Temp 98.4  F (36.9  C) (Oral)  Resp 16  Wt 96.3 kg (212 lb 6.4 oz)  SpO2 99%  BMI 30.48 kg/m2  GENERAL:  Not in acute distress, alert and oriented, well-nourished  and hydrated.   ECOG PERFORMANCE STATUS:  0.   KPS:  100%.   HEENT:  Moist mucous membranes with no thrush.  Pupils are equally round and reactive to light with no conjunctival erythema or jaundice.   NECK:  No palpable masses/lymphadenopathy.   Well healed scar in the neck  CARDIOVASCULAR:  Regular rate and rhythm, no murmurs.   PULMONARY:  Clear to auscultation bilaterally.   ABDOMEN:  Soft, nontender and nondistended, no palpable masses.   EXTREMITIES:  No bilateral ankle edema.   SKIN:  No rashes  NEUROLOGIC:  Grossly nonfocal.      LABORATORY DATA:     Hematology Studies   Recent Labs   Lab Test  10/26/17   1525  08/07/17   0750  07/26/17   1351  07/20/17   1605   WBC  3.1*  3.2*  5.2  3.6*   ANEU   --    --   4.7  2.8   ALYM   --    --   0.3*  0.3*   JANE   --    --   0.1  0.3   AEOS   --    --   0.0  0.0   HGB  14.6  13.5  16.0  14.0   HCT  43.4  39.0*  48.3  41.8   PLT  123*  78*  84*  101*          BIOPSIES:  thyroid biopsy (FNA) consistent with papillary thyroid carcinoma. Same for resected thyroid tissue. No vasc invasion. Neg margins.     ASSESSMENT AND PLAN:  This is a very pleasant 37 year old  gentleman with a prior history of combined liver/kidney transplant back in 05/2016, who developed  EBV-positive monomorphic PTLD (stage III), now treated on RSST protocol with 4 weekly infusions of rituximab,followed by R-COEP x 4,  who presents for his followup visit     - PTLD/DLBCL:  We reviewed with the patient his interval progress. He remains in CR1 as evidenced by his exam and labs. Interval improvement in plt counts.   Continue on current IS with tac 2 mg bid and pred 5 mg. Will follow up with transplant team.  Will obtain CT imaging in 3 mos with fu visit.   Labs ordered for future visits.   S/p  Thyroidectomy for papillary ca. Following with endocrine.     I will see him back in 3 mo with repeat labs and exam.      I spent over 50% of this 30-minute encounter in counseling the patient, updating him on  his interval progress, and outlining the ongoing plan of care as outlined above.  Overall, the complexity of the case remains high.      Girish Narayanan MD PhD  Hematology, Oncology and Transplantation  [facility]

## 2017-10-26 NOTE — NURSING NOTE
"Chief Complaint   Patient presents with     Port Draw     port accessed and labs drawn by rn.  vs taken.     Port accessed with 20g 3/4\" gripper needle, labs drawn, port flushed with saline and heparin, port de-accessed.  vitals checked, pt checked in for next appointment.  Danyelle Magana RN    "

## 2017-10-26 NOTE — MR AVS SNAPSHOT
After Visit Summary   10/26/2017    Cricket Chen    MRN: [id number]           Patient Information     Date Of Birth          1980        Visit Information        Provider Department      10/26/2017 3:30 PM Girish Narayanan MD [facility] Blood and Marrow Transplant        Today's Diagnoses     PTLD (post-transplant lymphoproliferative disorder) (H)    -  1    Liver replaced by transplant (H)              [facility] [place] [place]  [address] [place] [id number]  Phone: [phone]  Clinic Hours:   Monday-Thursday:7am to 7pm   Friday: 7am to 5pm   Weekends and holidays:    8am to noon (in general)  If your fever is 100.5  or greater,   call the clinic.  After hours call the   hospital at [phone] or   [phone]. Ask for the BMT   fellow on-call            Follow-ups after your visit        Your next 10 appointments already scheduled     Jan 16, 2018 12:10 PM CST   (Arrive by 11:55 AM)   Return Liver Transplant with Laureen Galvan MD   [facility] Hepatology ([facility])    [address] [place] [id number] [phone]            Jan 16, 2018  2:20 PM CST   (Arrive by 1:50 PM)   Return Kidney Transplant with Jake Sidhu MD   [facility] Nephrology ([facility])    [address] [place] [id number] [phone]            Jan 30, 2018 12:00 PM CST   (Arrive by 11:45 AM)   CT CHEST/ABDOMEN/PELVIS W CONTRAST with UCCT1   [facility] Imaging [place] [facility] ([facility])    [address] [place] [address] [id number] [phone]           Please bring any scans or X-rays taken at other hospitals, if similar tests were done. Also bring a list of your medicines, including vitamins, minerals and over-the-counter drugs. It is safest to leave personal items at home.  Be sure to tell your doctor:   If you have any allergies.    If there s any chance you are pregnant.   If you are breastfeeding.   If you have any special needs.  You may have contrast for this exam. To prepare:   Do not eat or drink for 2 hours before your exam. If you need to take medicine, you may take it with small sips of water. (We may ask you to take liquid medicine as well.)   The day before your exam, drink extra fluids at least six 8-ounce glasses (unless your doctor tells you to restrict your fluids).  Patients over 70 or patients with diabetes or kidney problems:   If you haven t had a blood test (creatinine test) within the last 30 days, go to your clinic or Diagnostic Imaging Department for this test.  If you have diabetes:   If your kidney function is normal, continue taking your metformin (Avandamet, Glucophage, Glucovance, Metaglip) on the day of your exam.   If your kidney function is abnormal, wait 48 hours before restarting this medicine.  You will have oral contrast for this exam:   You will drink the contrast at home. Get this from your clinic or Diagnostic Imaging Department. Please follow the directions given.  Please wear loose clothing, such as a sweat suit or jogging clothes. Avoid snaps, zippers and other metal. We may ask you to undress and put on a hospital gown.  If you have any questions, please call the Imaging Department where you will have your exam.            Feb 01, 2018  3:00 PM CST   [facility] with  [facility]   [facility] Draw ([facility])    [address] [place] [id number]   [phone]            Feb 01, 2018  3:30 PM CST   RETURN ONC with Girish Narayanan MD   [facility] Blood and Marrow Transplant ([facility])    [address] [place] [phone]   [phone]            Feb 09, 2018  1:50 PM CST   (Arrive by 1:35 PM)   RETURN ENDOCRINE with Zuleyma Gray MD   [facility] Endocrinology ([facility]  [facility])    [address] [facility]  [address] [facility] [address] [phone]              Future tests that were ordered for you today     Open Standing Orders        Priority Remaining Interval Expires Ordered    CBC with platelets differential Routine 10/10 every 3 mos 10/24/2018 10/26/2017    Comprehensive metabolic panel Routine 10/10 every 3 mos 10/24/2018 10/26/2017    Lactate Dehydrogenase Routine 10/10 every 3 mos 10/24/2018 10/26/2017          Open Future Orders        Priority Expected Expires Ordered    CT Chest/Abdomen/Pelvis w Contrast Routine 1/30/2018 10/10/2018 10/26/2017            Who to contact     If you have questions or need follow up information about today's clinic visit or your schedule please contact [facility] BLOOD AND MARROW TRANSPLANT directly at [phone].  Normal or non-critical lab and imaging results will be communicated to you by Solariahart, letter or phone within 4 business days after the clinic has received the results. If you do not hear from us within 7 days, please contact the clinic through Helidynet or phone. If you have a critical or abnormal lab result, we will notify you by phone as soon as possible.  Submit refill requests through Cool Lumens or call your pharmacy and they will forward the refill request to us. Please allow 3 business days for your refill to be completed.          Additional Information About Your Visit        Cool Lumens Information     Cool Lumens gives you secure access to your electronic health record. If you see a primary care provider, you can also send messages to your care team and make appointments. If you have questions, please call your primary care clinic.  If you do not have a primary care provider, please call [phone] and they will assist you.        Care EveryWhere ID     This is your Care EveryWhere ID. This could be used by other organizations to access your [facility] medical records  [id number]        Your Vitals Were      Pulse Temperature Respirations Pulse Oximetry BMI (Body Mass Index)       75 98.4  F (36.9  C) (Oral) 16 99% 30.48 kg/m2        Blood Pressure from Last 3 Encounters:   10/26/17 136/72   10/12/17 128/77   08/25/17 132/80    Weight from Last 3 Encounters:   10/26/17 96.3 kg (212 lb 6.4 oz)   08/25/17 93.7 kg (206 lb 8 oz)   08/16/17 91.2 kg (201 lb)              We Performed the Following     Basic metabolic panel     CBC with platelets     Hepatic panel     Magnesium        Recent Review Flowsheet Data     BMT Recent Results Latest Ref Rng & Units 6/7/2017 7/19/2017 7/20/2017 7/26/2017 8/7/2017 9/27/2017 10/26/2017    WBC 4.0 - 11.0 10e9/L 3.8(L) - 3.6(L) 5.2 3.2(L) - 3.1(L)    Hemoglobin 13.3 - 17.7 g/dL 12.6(L) - 14.0 16.0 13.5 - 14.6    Platelet Count 150 - 450 10e9/L 87(L) - 101(L) 84(L) 78(L) - 123(L)    Neutrophils (Absolute) 1.6 - 8.3 10e9/L 3.1 - 2.8 4.7 - - -    INR 0.86 - 1.14 - - - - - - -    Sodium 133 - 144 mmol/L 140 - 138 139 142 - 136    Potassium 3.4 - 5.3 mmol/L 3.8 - 4.4 4.2 3.6 - 4.5    Chloride 94 - 109 mmol/L 108 - 108 105 111(H) - 106    Glucose 70 - 99 mg/dL 87 93 94 91 90 - 98    Urea Nitrogen 7 - 30 mg/dL 21 - 18 18 23 - 22    Creatinine 0.66 - 1.25 mg/dL 1.15 1.3(H) 1.26(H) 1.43(H) 1.22 - 1.20    Calcium (Total) 8.5 - 10.1 mg/dL 8.9 - 9.0 9.9 9.2 9.0 9.0    Protein (Total) 6.8 - 8.8 g/dL 6.8 - 7.1 - - - 7.3    Albumin 3.4 - 5.0 g/dL 3.9 - 4.5 - - 4.0 4.3    Bilirubin (Direct) 0.0 - 0.2 mg/dL - - - - - - 0.1    Alkaline Phosphatase 40 - 150 U/L 71 - 56 - - - 71    AST 0 - 45 U/L 14 - 17 - - - 16    ALT 0 - 70 U/L 21 - 23 - - - 30    MCV 78 - 100 fl 95 - 93 94 90 - 88               Primary Care Provider    Physician No Ref-Primary       NO REF-PRIMARY PHYSICIAN        Equal Access to Services     [facility] : Ana M Barroso, jaime brito, robina rojas. [facility] [phone].    ATENCIÓN: candis Arrington  laura disposición servicios gratuitos de asistencia lingüística. Jaelyn puente [phone].    We comply with applicable federal civil rights laws and [place] laws. We do not discriminate on the basis of race, color, national origin, age, disability, sex, sexual orientation, or gender identity.            Thank you!     Thank you for choosing [facility] BLOOD AND MARROW TRANSPLANT  for your care. Our goal is always to provide you with excellent care. Hearing back from our patients is one way we can continue to improve our services. Please take a few minutes to complete the written survey that you may receive in the mail after your visit with us. Thank you!             Your Updated Medication List - Protect others around you: Learn how to safely use, store and throw away your medicines at www.disposemymeds.org.          This list is accurate as of: 10/26/17  4:16 PM.  Always use your most recent med list.                   Brand Name Dispense Instructions for use Diagnosis    amLODIPine 10 MG tablet    NORVASC    90 tablet    TAKE ONE TABLET BY MOUTH EVERY DAY    HTN (hypertension)       levothyroxine 150 MCG tablet    SYNTHROID/LEVOTHROID    90 tablet    Take 1 tablet (150 mcg) by mouth daily    Papillary thyroid carcinoma (H)       lisinopril 5 MG tablet    PRINIVIL/ZESTRIL    90 tablet    TAKE ONE TABLET BY MOUTH EVERY DAY    HTN (hypertension)       magnesium oxide 400 (241.3 MG) MG tablet    MAG-OX    120 tablet    Take 1 tablet (400 mg) by mouth 2 times daily    Hypomagnesemia       predniSONE 5 MG tablet    DELTASONE    90 tablet    Take 1 tablet (5 mg) by mouth daily    Liver replaced by transplant (H), S/P kidney transplant       sulfamethoxazole-trimethoprim 400-80 MG per tablet    BACTRIM/SEPTRA    30 tablet    Take 1 tablet by mouth daily    Liver replaced by transplant (H), S/P kidney transplant       tacrolimus 1 MG capsule    GENERIC EQUIVALENT    120 capsule    Take 2 capsules (2 mg) by mouth 2 times daily     Liver replaced by transplant (H), S/P kidney transplant       traMADol 50 MG tablet    ULTRAM    45 tablet    Take 1 tablet (50 mg) by mouth every 8 hours as needed for moderate pain    Chronic pain of left knee, Papillary thyroid carcinoma (H)       traZODone 50 MG tablet    DESYREL    30 tablet    Take 0.5-2 tablets ( mg) by mouth At Bedtime    Other insomnia

## 2017-10-29 NOTE — PROGRESS NOTES
REASON FOR VISIT:  Followup for history of PTLD, status post 4 weekly Rituxan and 4 cycles of R-CEOP presenting for the follow up.      HISTORY OF PRESENT ILLNESS/REVIEW OF SYSTEMS:  Mr. Chen is a very pleasant 37 year old gentleman, with a prior history as outlined above, who was diagnosed with PTLD early this year based on an excisional lymph node biopsy from 02/01/2017 (right neck biopsy of partial necrotic mass).  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  CellCept was reduced from 1500 b.i.d. to 750 twice a day, and  tacrolimus 3 mg twice a day was decreased to 2 mg twice a day. He has tolerated this  RSST very well. He underwent end of Rx PET/CT scan and had achieved CR1.     Of note, his diagnostic PET/CT scan identified a thyroid nodule, which was consistent with papillary thyroid carcinoma based on the ultrasound-guided fine needle aspiration. He underwent thyroidectomy.    Since last visit with me  He denies any F/C/NS. No pain, N/V/D. He is not aware of any lumps across his body.  No appetite or weight loss.  Gained weight.      REVIEW OF SYMPTOMS:   He denies any chest pain or dyspnea on exertion. No LE edema.      The rest of 12 points of ROS were reviewed and found to be negative, unless as mentioned above.      PHYSICAL EXAMINATION:   /72 (BP Location: Right arm, Patient Position: Sitting, Cuff Size: Adult Regular)  Pulse 75  Temp 98.4  F (36.9  C) (Oral)  Resp 16  Wt 96.3 kg (212 lb 6.4 oz)  SpO2 99%  BMI 30.48 kg/m2  GENERAL:  Not in acute distress, alert and oriented, well-nourished and hydrated.   ECOG PERFORMANCE STATUS:  0.   KPS:  100%.   HEENT:  Moist mucous membranes with no thrush.  Pupils are equally round and reactive to light with no conjunctival erythema or jaundice.   NECK:  No palpable masses/lymphadenopathy.   Well healed scar in the neck  CARDIOVASCULAR:  Regular  rate and rhythm, no murmurs.   PULMONARY:  Clear to auscultation bilaterally.   ABDOMEN:  Soft, nontender and nondistended, no palpable masses.   EXTREMITIES:  No bilateral ankle edema.   SKIN:  No rashes  NEUROLOGIC:  Grossly nonfocal.      LABORATORY DATA:     Hematology Studies   Recent Labs   Lab Test  10/26/17   1525  08/07/17   0750  07/26/17   1351  07/20/17   1605   WBC  3.1*  3.2*  5.2  3.6*   ANEU   --    --   4.7  2.8   ALYM   --    --   0.3*  0.3*   JANE   --    --   0.1  0.3   AEOS   --    --   0.0  0.0   HGB  14.6  13.5  16.0  14.0   HCT  43.4  39.0*  48.3  41.8   PLT  123*  78*  84*  101*          BIOPSIES:  thyroid biopsy (FNA) consistent with papillary thyroid carcinoma. Same for resected thyroid tissue. No vasc invasion. Neg margins.     ASSESSMENT AND PLAN:  This is a very pleasant 37 year old  gentleman with a prior history of combined liver/kidney transplant back in 05/2016, who developed  EBV-positive monomorphic PTLD (stage III), now treated on RSST protocol with 4 weekly infusions of rituximab,followed by R-COEP x 4,  who presents for his followup visit     - PTLD/DLBCL:  We reviewed with the patient his interval progress. He remains in CR1 as evidenced by his exam and labs. Interval improvement in plt counts.   Continue on current IS with tac 2 mg bid and pred 5 mg. Will follow up with transplant team.  Will obtain CT imaging in 3 mos with fu visit.   Labs ordered for future visits.   S/p  Thyroidectomy for papillary ca. Following with endocrine.     I will see him back in 3 mo with repeat labs and exam.      I spent over 50% of this 30-minute encounter in counseling the patient, updating him on his interval progress, and outlining the ongoing plan of care as outlined above.  Overall, the complexity of the case remains high.      Girish Narayanan MD PhD  Hematology, Oncology and Transplantation  [facility]

## 2017-11-06 ENCOUNTER — ANESTHESIA EVENT (OUTPATIENT)
Dept: SURGERY | Facility: CLINIC | Age: 37
End: 2017-11-06
Payer: COMMERCIAL

## 2017-11-07 ENCOUNTER — ANESTHESIA (OUTPATIENT)
Dept: SURGERY | Facility: CLINIC | Age: 37
End: 2017-11-07
Payer: COMMERCIAL

## 2017-11-07 ENCOUNTER — HOSPITAL ENCOUNTER (OUTPATIENT)
Facility: CLINIC | Age: 37
Discharge: HOME HEALTH | End: 2017-11-07
Attending: SURGERY | Admitting: SURGERY
Payer: COMMERCIAL

## 2017-11-07 VITALS
DIASTOLIC BLOOD PRESSURE: 90 MMHG | BODY MASS INDEX: 30.52 KG/M2 | OXYGEN SATURATION: 97 % | SYSTOLIC BLOOD PRESSURE: 130 MMHG | WEIGHT: 213.19 LBS | TEMPERATURE: 98 F | HEIGHT: 70 IN | RESPIRATION RATE: 16 BRPM

## 2017-11-07 LAB
BASOPHILS # BLD AUTO: 0 10E9/L (ref 0–0.2)
BASOPHILS NFR BLD AUTO: 0.2 %
CREAT SERPL-MCNC: 1.21 MG/DL (ref 0.66–1.25)
DIFFERENTIAL METHOD BLD: ABNORMAL
EOSINOPHIL # BLD AUTO: 0 10E9/L (ref 0–0.7)
EOSINOPHIL NFR BLD AUTO: 0.7 %
ERYTHROCYTE [DISTWIDTH] IN BLOOD BY AUTOMATED COUNT: 13 % (ref 10–15)
GFR SERPL CREATININE-BSD FRML MDRD: 67 ML/MIN/1.7M2
GLUCOSE BLDC GLUCOMTR-MCNC: 83 MG/DL (ref 70–99)
HCT VFR BLD AUTO: 41.7 % (ref 40–53)
HGB BLD-MCNC: 14.3 G/DL (ref 13.3–17.7)
IMM GRANULOCYTES # BLD: 0 10E9/L (ref 0–0.4)
IMM GRANULOCYTES NFR BLD: 0.5 %
INR PPP: 1.02 (ref 0.86–1.14)
LYMPHOCYTES # BLD AUTO: 0.6 10E9/L (ref 0.8–5.3)
LYMPHOCYTES NFR BLD AUTO: 10.4 %
MCH RBC QN AUTO: 30 PG (ref 26.5–33)
MCHC RBC AUTO-ENTMCNC: 34.3 G/DL (ref 31.5–36.5)
MCV RBC AUTO: 87 FL (ref 78–100)
MONOCYTES # BLD AUTO: 0.6 10E9/L (ref 0–1.3)
MONOCYTES NFR BLD AUTO: 9.7 %
NEUTROPHILS # BLD AUTO: 4.4 10E9/L (ref 1.6–8.3)
NEUTROPHILS NFR BLD AUTO: 78.5 %
NRBC # BLD AUTO: 0 10*3/UL
NRBC BLD AUTO-RTO: 0 /100
PLATELET # BLD AUTO: 94 10E9/L (ref 150–450)
POTASSIUM SERPL-SCNC: 3.6 MMOL/L (ref 3.4–5.3)
RBC # BLD AUTO: 4.77 10E12/L (ref 4.4–5.9)
WBC # BLD AUTO: 5.7 10E9/L (ref 4–11)

## 2017-11-07 PROCEDURE — 25000128 H RX IP 250 OP 636: Performed by: NURSE ANESTHETIST, CERTIFIED REGISTERED

## 2017-11-07 PROCEDURE — 36000053 ZZH SURGERY LEVEL 2 EA 15 ADDTL MIN - UMMC: Performed by: SURGERY

## 2017-11-07 PROCEDURE — 37000008 ZZH ANESTHESIA TECHNICAL FEE, 1ST 30 MIN: Performed by: SURGERY

## 2017-11-07 PROCEDURE — 25000128 H RX IP 250 OP 636: Performed by: CLINICAL NURSE SPECIALIST

## 2017-11-07 PROCEDURE — 36000051 ZZH SURGERY LEVEL 2 1ST 30 MIN - UMMC: Performed by: SURGERY

## 2017-11-07 PROCEDURE — 84132 ASSAY OF SERUM POTASSIUM: CPT | Performed by: CLINICAL NURSE SPECIALIST

## 2017-11-07 PROCEDURE — 25000125 ZZHC RX 250: Performed by: SURGERY

## 2017-11-07 PROCEDURE — 85610 PROTHROMBIN TIME: CPT | Performed by: CLINICAL NURSE SPECIALIST

## 2017-11-07 PROCEDURE — 40000170 ZZH STATISTIC PRE-PROCEDURE ASSESSMENT II: Performed by: SURGERY

## 2017-11-07 PROCEDURE — 25000132 ZZH RX MED GY IP 250 OP 250 PS 637: Performed by: SURGERY

## 2017-11-07 PROCEDURE — 27210794 ZZH OR GENERAL SUPPLY STERILE: Performed by: SURGERY

## 2017-11-07 PROCEDURE — 71000027 ZZH RECOVERY PHASE 2 EACH 15 MINS: Performed by: SURGERY

## 2017-11-07 PROCEDURE — 37000009 ZZH ANESTHESIA TECHNICAL FEE, EACH ADDTL 15 MIN: Performed by: SURGERY

## 2017-11-07 PROCEDURE — 85025 COMPLETE CBC W/AUTO DIFF WBC: CPT | Performed by: CLINICAL NURSE SPECIALIST

## 2017-11-07 PROCEDURE — 82565 ASSAY OF CREATININE: CPT | Performed by: CLINICAL NURSE SPECIALIST

## 2017-11-07 PROCEDURE — 82962 GLUCOSE BLOOD TEST: CPT

## 2017-11-07 RX ORDER — FENTANYL CITRATE 50 UG/ML
25-50 INJECTION, SOLUTION INTRAMUSCULAR; INTRAVENOUS
Status: DISCONTINUED | OUTPATIENT
Start: 2017-11-07 | End: 2017-11-07 | Stop reason: HOSPADM

## 2017-11-07 RX ORDER — OXYCODONE AND ACETAMINOPHEN 5; 325 MG/1; MG/1
2 TABLET ORAL EVERY 4 HOURS PRN
Status: DISCONTINUED | OUTPATIENT
Start: 2017-11-07 | End: 2017-11-07 | Stop reason: HOSPADM

## 2017-11-07 RX ORDER — IBUPROFEN 600 MG/1
600 TABLET, FILM COATED ORAL
Status: DISCONTINUED | OUTPATIENT
Start: 2017-11-07 | End: 2017-11-07 | Stop reason: HOSPADM

## 2017-11-07 RX ORDER — LIDOCAINE 40 MG/G
CREAM TOPICAL
Status: DISCONTINUED | OUTPATIENT
Start: 2017-11-07 | End: 2017-11-07 | Stop reason: HOSPADM

## 2017-11-07 RX ORDER — HEPARIN SODIUM (PORCINE) LOCK FLUSH IV SOLN 100 UNIT/ML 100 UNIT/ML
5 SOLUTION INTRAVENOUS
Status: DISCONTINUED | OUTPATIENT
Start: 2017-11-07 | End: 2017-11-07 | Stop reason: HOSPADM

## 2017-11-07 RX ORDER — SODIUM CHLORIDE, SODIUM LACTATE, POTASSIUM CHLORIDE, CALCIUM CHLORIDE 600; 310; 30; 20 MG/100ML; MG/100ML; MG/100ML; MG/100ML
INJECTION, SOLUTION INTRAVENOUS CONTINUOUS PRN
Status: DISCONTINUED | OUTPATIENT
Start: 2017-11-07 | End: 2017-11-07

## 2017-11-07 RX ORDER — ONDANSETRON 2 MG/ML
4 INJECTION INTRAMUSCULAR; INTRAVENOUS EVERY 30 MIN PRN
Status: DISCONTINUED | OUTPATIENT
Start: 2017-11-07 | End: 2017-11-07 | Stop reason: HOSPADM

## 2017-11-07 RX ORDER — ONDANSETRON 2 MG/ML
INJECTION INTRAMUSCULAR; INTRAVENOUS PRN
Status: DISCONTINUED | OUTPATIENT
Start: 2017-11-07 | End: 2017-11-07

## 2017-11-07 RX ORDER — PROPOFOL 10 MG/ML
INJECTION, EMULSION INTRAVENOUS CONTINUOUS PRN
Status: DISCONTINUED | OUTPATIENT
Start: 2017-11-07 | End: 2017-11-07

## 2017-11-07 RX ORDER — ONDANSETRON 4 MG/1
4 TABLET, ORALLY DISINTEGRATING ORAL EVERY 30 MIN PRN
Status: DISCONTINUED | OUTPATIENT
Start: 2017-11-07 | End: 2017-11-07 | Stop reason: HOSPADM

## 2017-11-07 RX ORDER — MEPERIDINE HYDROCHLORIDE 25 MG/ML
12.5 INJECTION INTRAMUSCULAR; INTRAVENOUS; SUBCUTANEOUS
Status: DISCONTINUED | OUTPATIENT
Start: 2017-11-07 | End: 2017-11-07 | Stop reason: HOSPADM

## 2017-11-07 RX ORDER — CEFAZOLIN SODIUM 2 G/100ML
2 INJECTION, SOLUTION INTRAVENOUS
Status: COMPLETED | OUTPATIENT
Start: 2017-11-07 | End: 2017-11-07

## 2017-11-07 RX ORDER — FLUMAZENIL 0.1 MG/ML
0.2 INJECTION, SOLUTION INTRAVENOUS
Status: DISCONTINUED | OUTPATIENT
Start: 2017-11-07 | End: 2017-11-07 | Stop reason: HOSPADM

## 2017-11-07 RX ORDER — NALOXONE HYDROCHLORIDE 0.4 MG/ML
.1-.4 INJECTION, SOLUTION INTRAMUSCULAR; INTRAVENOUS; SUBCUTANEOUS
Status: DISCONTINUED | OUTPATIENT
Start: 2017-11-07 | End: 2017-11-07 | Stop reason: HOSPADM

## 2017-11-07 RX ORDER — FENTANYL CITRATE 50 UG/ML
INJECTION, SOLUTION INTRAMUSCULAR; INTRAVENOUS PRN
Status: DISCONTINUED | OUTPATIENT
Start: 2017-11-07 | End: 2017-11-07

## 2017-11-07 RX ORDER — SODIUM CHLORIDE, SODIUM LACTATE, POTASSIUM CHLORIDE, CALCIUM CHLORIDE 600; 310; 30; 20 MG/100ML; MG/100ML; MG/100ML; MG/100ML
INJECTION, SOLUTION INTRAVENOUS CONTINUOUS
Status: DISCONTINUED | OUTPATIENT
Start: 2017-11-07 | End: 2017-11-07 | Stop reason: HOSPADM

## 2017-11-07 RX ADMIN — MIDAZOLAM HYDROCHLORIDE 1 MG: 1 INJECTION, SOLUTION INTRAMUSCULAR; INTRAVENOUS at 08:26

## 2017-11-07 RX ADMIN — FENTANYL CITRATE 25 MCG: 50 INJECTION, SOLUTION INTRAMUSCULAR; INTRAVENOUS at 10:08

## 2017-11-07 RX ADMIN — FENTANYL CITRATE 25 MCG: 50 INJECTION, SOLUTION INTRAMUSCULAR; INTRAVENOUS at 09:46

## 2017-11-07 RX ADMIN — FENTANYL CITRATE 50 MCG: 50 INJECTION, SOLUTION INTRAMUSCULAR; INTRAVENOUS at 08:31

## 2017-11-07 RX ADMIN — PROPOFOL 100 MCG/KG/MIN: 10 INJECTION, EMULSION INTRAVENOUS at 08:35

## 2017-11-07 RX ADMIN — CEFAZOLIN SODIUM 2 G: 2 INJECTION, SOLUTION INTRAVENOUS at 08:36

## 2017-11-07 RX ADMIN — OXYCODONE HYDROCHLORIDE AND ACETAMINOPHEN 2 TABLET: 5; 325 TABLET ORAL at 11:48

## 2017-11-07 RX ADMIN — SODIUM CHLORIDE, POTASSIUM CHLORIDE, SODIUM LACTATE AND CALCIUM CHLORIDE: 600; 310; 30; 20 INJECTION, SOLUTION INTRAVENOUS at 08:26

## 2017-11-07 RX ADMIN — ONDANSETRON 4 MG: 2 INJECTION INTRAMUSCULAR; INTRAVENOUS at 10:03

## 2017-11-07 ASSESSMENT — PAIN DESCRIPTION - DESCRIPTORS
DESCRIPTORS: ACHING;SORE
DESCRIPTORS: DISCOMFORT

## 2017-11-07 NOTE — IP AVS SNAPSHOT
MRN:[id number]                      After Visit Summary   11/7/2017    Cricket Chen    MRN: [id number]           Thank you!     Thank you for choosing [facility] for your care. Our goal is always to provide you with excellent care. Hearing back from our patients is one way we can continue to improve our services. Please take a few minutes to complete the written survey that you may receive in the mail after you visit with us. Thank you!        Patient Information     Date Of Birth          1980        About your hospital stay     You were admitted on:  November 7, 2017 You last received care in the:  Same Day Surgery [facility]    You were discharged on:  November 7, 2017       Who to Call     For medical emergencies, please call 911.  For non-urgent questions about your medical care, please call your primary care provider or clinic, None  For questions related to your surgery, please call your surgery clinic        Attending Provider     Provider Padmaja North MD Vascular Surgery       Primary Care Provider    Physician No Ref-Primary      After Care Instructions     Diet Instructions       Resume pre-procedure diet            Discharge Instructions       Follow up appointment with Dr. Eaton Thursday for dressing removal 11/09/2017            Dressing       Keep dressing clean and dry.  Dressing / incisional care as instructed by RN and or Surgeon            No lifting        No lifting over 10 lbs and no strenuous physical activity for 4 weeks            Shower       No shower for 24 hours post procedure. May shower Postoperative Day (POD)  0. Keep the right arm dry and out of the shower. Keep Kurlex and coban wrap intact. May remove and re-wrap ace bandage as tolerated.                  Your next 10 appointments already scheduled     Jan 16, 2018 12:10 PM CST   (Arrive by 11:55 AM)   Return Liver Transplant with Laureen Galvan MD   [facility] Hepatology ([facility])    [address] [place] [id number]   [phone]            Jan 16, 2018  2:20 PM CST   (Arrive by 1:50 PM)   Return Kidney Transplant with Jake Sidhu MD   [facility] Nephrology ([facility])    [address] [place] [id number]   [phone]            Jan 30, 2018 12:00 PM CST   (Arrive by 11:45 AM)   CT CHEST/ABDOMEN/PELVIS W CONTRAST with UCCT1   [facility] CT ([facility])    [address] [address]   [phone]           Please bring any scans or X-rays taken at other hospitals, if similar tests were done. Also bring a list of your medicines, including vitamins, minerals and over-the-counter drugs. It is safest to leave personal items at home.  Be sure to tell your doctor:   If you have any allergies.   If there s any chance you are pregnant.   If you are breastfeeding.   If you have any special needs.  You may have contrast for this exam. To prepare:   Do not eat or drink for 2 hours before your exam. If you need to take medicine, you may take it with small sips of water. (We may ask you to take liquid medicine as well.)   The day before your exam, drink extra fluids at least six 8-ounce glasses (unless your doctor tells you to restrict your fluids).  Patients over 70 or patients with diabetes or kidney problems:   If you haven t had a blood test (creatinine test) within the last 30 days, go to your clinic or Diagnostic Imaging Department for this test.  If you have diabetes:   If your kidney function is normal, continue taking your metformin (Avandamet, Glucophage, Glucovance, Metaglip) on the day of your exam.   If your kidney function is abnormal, wait 48 hours before restarting this medicine.  You will have oral contrast for this exam:   You will drink the contrast at home. Get this from your clinic or Diagnostic  Imaging Department. Please follow the directions given.  Please wear loose clothing, such as a sweat suit or jogging clothes. Avoid snaps, zippers and other metal. We may ask you to undress and put on a hospital gown.  If you have any questions, please call the Imaging Department where you will have your exam.            Feb 01, 2018  3:00 PM CST   [facility] with  [facility]   [facility] Lab Draw ([facility])    [address] [place] [phone]   [phone]            Feb 01, 2018  3:30 PM CST   RETURN ONC with Girish Narayanan MD   [facility] Blood and Marrow Transplant ([facility])    [address] [place] [id number]   [phone]            Feb 09, 2018  1:50 PM CST   (Arrive by 1:35 PM)   RETURN ENDOCRINE with Zuleyma Gray MD   [facility] Endocrinology ([facility])    [address] [place] [id number] [phone]              Further instructions from your care team       [facility] [place]  Same-Day Surgery   Adult Discharge Orders & Instructions     For 24 hours after surgery    1. Get plenty of rest.  A responsible adult must stay with you for at least 24 hours after you leave the hospital.   2. Do not drive or use heavy equipment.  If you have weakness or tingling, don't drive or use heavy equipment until this feeling goes away.  3. Do not drink alcohol.  4. Avoid strenuous or risky activities.  Ask for help when climbing stairs.   5. You may feel lightheaded.  IF so, sit for a few minutes before standing.  Have someone help you get up.   6. If you have nausea (feel sick to your stomach): Drink only clear liquids such as apple juice, ginger ale, broth or 7-Up.  Rest may also help.  Be sure to drink enough fluids.  Move to a regular diet as you feel able.  7. You may have a slight fever. Call  "the doctor if your fever is over 100 F (37.7 C) (taken under the tongue) or lasts longer than 24 hours.  8. You may have a dry mouth, a sore throat, muscle aches or trouble sleeping.  These should go away after 24 hours.  9. Do not make important or legal decisions.   Call your doctor for any of the followin.  Signs of infection (fever, growing tenderness at the surgery site, a large amount of drainage or bleeding, severe pain, foul-smelling drainage, redness, swelling).    2. It has been over 8 to 10 hours since surgery and you are still not able to urinate (pass water).    3.  Headache for over 24 hours.      To contact a doctor, call the Surgery Vascular clinic at [phone] Monday-Friday between 8:30-5:00pm.  For after hours, weekends, and holidays you can call [phone] and ask for the resident on call for   Vascular Surgery.  (Answered 24 hours a day)    Emergency Department:    [facility]: [phone]       (TTY for hearing impaired: [phone])    [facility]: [phone]       (TTY for hearing impaired: [phone])        Pending Results     No orders found from 2017 to 2017.            Admission Information     Date & Time Provider Department Dept. Phone    2017 Padmaja Eaton MD Same Day Surgery [facility] [phone]      Your Vitals Were     Blood Pressure Temperature Respirations Height Weight Pulse Oximetry    118/74 97.8  F (36.6  C) (Oral) 14 1.778 m (5' 10\") 96.7 kg (213 lb 3 oz) 95%    BMI (Body Mass Index)                   30.59 kg/m2           MyChart Information     [facility] gives you secure access to your electronic health record. If you see a primary care provider, you can also send messages to your care team and make appointments. If you have questions, please call your primary care clinic.  If you do not have a primary care provider, please call [phone] and they will assist you.        Care EveryWhere ID     This is your Care EveryWhere " ID. This could be used by other organizations to access your [facility] medical records  [id number]        Equal Access to Services     CLAY VIDES : Hadii steffany Barroso, waeliazar brito, melchoralec brionesrajwindercindi mccann, robina turnersnowannika sibley. So [facility] [phone].    ATENCIÓN: Si habla español, tiene a laura disposición servicios gratuitos de asistencia lingüística. Llame al [phone].    We comply with applicable federal civil rights laws and [place] laws. We do not discriminate on the basis of race, color, national origin, age, disability, sex, sexual orientation, or gender identity.               Review of your medicines      CONTINUE these medicines which have NOT CHANGED        Dose / Directions    amLODIPine 10 MG tablet   Commonly known as:  NORVASC   Used for:  HTN (hypertension)        TAKE ONE TABLET BY MOUTH EVERY DAY   Quantity:  90 tablet   Refills:  3       levothyroxine 150 MCG tablet   Commonly known as:  SYNTHROID/LEVOTHROID   Used for:  Papillary thyroid carcinoma (H)        Dose:  150 mcg   Take 1 tablet (150 mcg) by mouth daily   Quantity:  90 tablet   Refills:  4       lisinopril 5 MG tablet   Commonly known as:  PRINIVIL/ZESTRIL   Used for:  HTN (hypertension)        TAKE ONE TABLET BY MOUTH EVERY DAY   Quantity:  90 tablet   Refills:  3       magnesium oxide 400 (241.3 MG) MG tablet   Commonly known as:  MAG-OX   Used for:  Hypomagnesemia        Dose:  400 mg   Take 1 tablet (400 mg) by mouth 2 times daily   Quantity:  120 tablet   Refills:  11       predniSONE 5 MG tablet   Commonly known as:  DELTASONE   Used for:  Liver replaced by transplant (H), S/P kidney transplant        Dose:  5 mg   Take 1 tablet (5 mg) by mouth daily   Quantity:  90 tablet   Refills:  3       sulfamethoxazole-trimethoprim 400-80 MG per tablet   Commonly known as:  BACTRIM/SEPTRA   Indication:  PCP Prophylaxis   Used for:  Liver replaced by transplant (H), S/P kidney transplant         Dose:  1 tablet   Take 1 tablet by mouth daily   Quantity:  30 tablet   Refills:  11       tacrolimus 1 MG capsule   Commonly known as:  GENERIC EQUIVALENT   Used for:  Liver replaced by transplant (H), S/P kidney transplant        Dose:  2 mg   Take 2 capsules (2 mg) by mouth 2 times daily   Quantity:  120 capsule   Refills:  11       traMADol 50 MG tablet   Commonly known as:  ULTRAM   Used for:  Chronic pain of left knee, Papillary thyroid carcinoma (H)        Dose:  50 mg   Take 1 tablet (50 mg) by mouth every 8 hours as needed for moderate pain   Quantity:  45 tablet   Refills:  0       traZODone 50 MG tablet   Commonly known as:  DESYREL   Used for:  Other insomnia        Dose:   mg   Take 0.5-2 tablets ( mg) by mouth At Bedtime   Quantity:  30 tablet   Refills:  1                Protect others around you: Learn how to safely use, store and throw away your medicines at www.disposemymeds.org.             Medication List: This is a list of all your medications and when to take them. Check marks below indicate your daily home schedule. Keep this list as a reference.      Medications           Morning Afternoon Evening Bedtime As Needed    amLODIPine 10 MG tablet   Commonly known as:  NORVASC   TAKE ONE TABLET BY MOUTH EVERY DAY                                levothyroxine 150 MCG tablet   Commonly known as:  SYNTHROID/LEVOTHROID   Take 1 tablet (150 mcg) by mouth daily                                lisinopril 5 MG tablet   Commonly known as:  PRINIVIL/ZESTRIL   TAKE ONE TABLET BY MOUTH EVERY DAY                                magnesium oxide 400 (241.3 MG) MG tablet   Commonly known as:  MAG-OX   Take 1 tablet (400 mg) by mouth 2 times daily                                predniSONE 5 MG tablet   Commonly known as:  DELTASONE   Take 1 tablet (5 mg) by mouth daily                                sulfamethoxazole-trimethoprim 400-80 MG per tablet   Commonly known as:  BACTRIM/SEPTRA   Take 1 tablet by  mouth daily                                tacrolimus 1 MG capsule   Commonly known as:  GENERIC EQUIVALENT   Take 2 capsules (2 mg) by mouth 2 times daily                                traMADol 50 MG tablet   Commonly known as:  ULTRAM   Take 1 tablet (50 mg) by mouth every 8 hours as needed for moderate pain                                traZODone 50 MG tablet   Commonly known as:  DESYREL   Take 0.5-2 tablets ( mg) by mouth At Bedtime

## 2017-11-07 NOTE — ANESTHESIA POSTPROCEDURE EVALUATION
Patient: Cricket Chen    Procedure(s):  Revision of Right Arm Arteriovenous Fistula  - Wound Class: I-Clean    Diagnosis:Malfunctioning Fistula   Diagnosis Additional Information: No value filed.    Anesthesia Type:  General, MAC    Note:  Anesthesia Post Evaluation    Patient location during evaluation: PACU  Patient participation: Able to fully participate in evaluation  Level of consciousness: awake and alert  Pain management: adequate  Airway patency: patent  Cardiovascular status: acceptable and hemodynamically stable  Respiratory status: acceptable  Hydration status: acceptable  PONV: none     Anesthetic complications: None          Last vitals:  Vitals:    11/07/17 0707 11/07/17 1057 11/07/17 1100   BP: 126/78 118/74    Resp: 15 14 14   Temp: 36.6  C (97.9  F) 36.6  C (97.8  F)    SpO2: 98% 95%          Electronically Signed By: Claude Jasmine MD  November 7, 2017  11:09 AM

## 2017-11-07 NOTE — ANESTHESIA CARE TRANSFER NOTE
Patient: Cricket Chen    Procedure(s):  Revision of Right Arm Arteriovenous Fistula  - Wound Class: I-Clean    Diagnosis: Malfunctioning Fistula   Diagnosis Additional Information: No value filed.    Anesthesia Type:   General, MAC     Note:  Airway :Room Air  Patient transferred to:Phase II  Comments: Pt transferred to Phase II.  Maintaining airway and oxygenation via RA.  Denies pain and nausea.  Report to RN.  All questions answered. Handoff Report: Identifed the Patient, Identified the Reponsible Provider, Reviewed the pertinent medical history, Discussed the surgical course, Reviewed Intra-OP anesthesia mangement and issues during anesthesia, Set expectations for post-procedure period and Allowed opportunity for questions and acknowledgement of understanding      Vitals: (Last set prior to Anesthesia Care Transfer)    CRNA VITALS  11/7/2017 1022 - 11/7/2017 1100      11/7/2017             Resp Rate (set): 10                Electronically Signed By: DARYN Valdez CRNA  November 7, 2017  11:00 AM

## 2017-11-07 NOTE — DISCHARGE INSTRUCTIONS
[facility]  Same-Day Surgery   Adult Discharge Orders & Instructions     For 24 hours after surgery    1. Get plenty of rest.  A responsible adult must stay with you for at least 24 hours after you leave the hospital.   2. Do not drive or use heavy equipment.  If you have weakness or tingling, don't drive or use heavy equipment until this feeling goes away.  3. Do not drink alcohol.  4. Avoid strenuous or risky activities.  Ask for help when climbing stairs.   5. You may feel lightheaded.  IF so, sit for a few minutes before standing.  Have someone help you get up.   6. If you have nausea (feel sick to your stomach): Drink only clear liquids such as apple juice, ginger ale, broth or 7-Up.  Rest may also help.  Be sure to drink enough fluids.  Move to a regular diet as you feel able.  7. You may have a slight fever. Call the doctor if your fever is over 100 F (37.7 C) (taken under the tongue) or lasts longer than 24 hours.  8. You may have a dry mouth, a sore throat, muscle aches or trouble sleeping.  These should go away after 24 hours.  9. Do not make important or legal decisions.   Call your doctor for any of the followin.  Signs of infection (fever, growing tenderness at the surgery site, a large amount of drainage or bleeding, severe pain, foul-smelling drainage, redness, swelling).    2. It has been over 8 to 10 hours since surgery and you are still not able to urinate (pass water).    3.  Headache for over 24 hours.      To contact a doctor, call the Surgery Vascular clinic at [phone] Monday-Friday between 8:30-5:00pm.  For after hours, weekends, and holidays you can call [phone] and ask for the resident on call for   Vascular Surgery.  (Answered 24 hours a day)    Emergency Department:    [facility]: [phone]       (TTY for hearing impaired: [phone])    [facility]: [phone]       (TTY for hearing impaired: [phone])

## 2017-11-07 NOTE — OR NURSING
Spoke with Beth from Dr. Foss office and appointment made for patient to see Dr. Eaton in the clinic on Thursday 11/09/17 for dressing change as ordered.  Follow up appointment confirmed with patient and his mother-Linsey and verbal understanding expressed.  MARTELL Alonso RN

## 2017-11-07 NOTE — OR NURSING
Discharge instructions reviewed with patient and mother-Linsey.  Verbal understanding expressed.  Port-a-cath de-accessed and site without redness, swelling, or tenderness.  Patient dressed for discharge and tolerated activity well.  VSS.   Patient complains of increased numbness now in (R) hand and it is noted that coban and ace bandage wrapped around (R) hand and (R) thumb is extremely tight and there is now some very slight discoloration of (R) hand and capillary refill to skin and nail beds is approximately 4 seconds.  Lower ace bandage removed- coban and gauze cut back from around (R) thumb and rolled back.  Patient states immediate relief from tightness sensation and numbness noted to immediately improve as well.  (R) radial pulse has remained palpable and CSM is adequate to (R) hand and fingers.  Lower ace bandage then rewrapped from wrist area up to elbow.  No other wraps or dressings were disturbed.  MARTELL Alonso RN

## 2017-11-07 NOTE — OR NURSING
Port-a-cath de-accessed without incidence per protocol-flushed with 10 ml Normal Saline followed by Heparin 100u/ml -5ml total to flush port.  MARTELL Alonso RN

## 2017-11-07 NOTE — OR NURSING
Confirmed ride and responsible adult with pt's dad, Eric.  Pt's mom Linsey will be picking him up.

## 2017-11-07 NOTE — H&P
History and Physical:     History of Present Illness:   Mr. Chen is a 37 year old with a PMHx of Liver and Kidney Transplant in 2016 with previous right sided UE fistula, HTN, anxiety, Large B Cell Lymphoma s/p Chemotherapy who presented to clinic with right arm pain when using his cell phone, cooking, or cleaning. The right arm pain is greatly affecting his quality of life. Given that the patient's fistula is not being actively used, we will plan for AV fistula ligation.     Past Medical History:   Diagnosis Date     Alcohol abuse     Last drink in Mid-April 2014     Anemia in ESRD (end-stage renal disease) (H)      Anxiety 2008     Atrial flutter (H)      Cirrhosis (H)     S/P liver transplant     Depression      History of blood transfusion      History of transposition of great vessels     atrial switch at age 8 months old     Hypertension      Liver transplant recipient (H)     2016     Papillary thyroid carcinoma (H)      Pneumonia 11-15-14     Renal transplant recipient     2016     Varices, esophageal (H)      Past Surgical History:   Procedure Laterality Date     BENCH LIVER N/A 5/10/2016    Procedure: BENCH LIVER;  Surgeon: Ricky Deshpande MD;  [facility]     BIOPSY LYMPH NODE CERVICAL Right 1/26/2017    Procedure: BIOPSY LYMPH NODE CERVICAL;  Surgeon: Beka Soto MD;  Location:  OR     CARDIAC SURGERY  9-10-80     CREATE FISTULA ARTERIOVENOUS UPPER EXTREMITY Right 9/16/2014    Procedure: CREATE FISTULA ARTERIOVENOUS UPPER EXTREMITY;  Surgeon: Padmaja Eaton MD;  [facility]     CYSTOSCOPY, REMOVE STENT(S), COMBINED Right 6/22/2016    Procedure: COMBINED CYSTOSCOPY, REMOVE STENT(S);  Surgeon: Ricky Deshpande MD;  Location:  OR     ENT SURGERY       ESOPHAGOSCOPY, GASTROSCOPY, DUODENOSCOPY (EGD), COMBINED  5/30/2014    Procedure: COMBINED ESOPHAGOSCOPY, GASTROSCOPY, DUODENOSCOPY (EGD);  Surgeon: Guillaume Bautista MD;  Location:  GI     ESOPHAGOSCOPY, GASTROSCOPY,  DUODENOSCOPY (EGD), COMBINED  14     ESOPHAGOSCOPY, GASTROSCOPY, DUODENOSCOPY (EGD), COMBINED Left 3/12/2015    Procedure: COMBINED ESOPHAGOSCOPY, GASTROSCOPY, DUODENOSCOPY (EGD);  Surgeon: Laureen Galvan MD;  Location:  GI     ESOPHAGOSCOPY, GASTROSCOPY, DUODENOSCOPY (EGD), COMBINED N/A 2016    Procedure: COMBINED ESOPHAGOSCOPY, GASTROSCOPY, DUODENOSCOPY (EGD);  Surgeon: Laureen Galvan MD;  [facility]     ESOPHAGOSCOPY, GASTROSCOPY, DUODENOSCOPY (EGD), COMBINED N/A 2016    Procedure: COMBINED ESOPHAGOSCOPY, GASTROSCOPY, DUODENOSCOPY (EGD);  Surgeon: Laureen Galvan MD;  Location:  GI     HC OR CATH ABLATION NON-CARDIAC ENDOVASCULAR      SVT     INSERT PORT VASCULAR ACCESS N/A 4/3/2017    Procedure: INSERT PORT VASCULAR ACCESS;  Surgeon: Rajendra Jacques PA-C;  [facility]     PICC INSERTION  14    PICC line placement 14; Removal 2014     THORACIC SURGERY  1980    Transposition great arteries, repaired at 8 months     THYROIDECTOMY Right 2017    Procedure: THYROIDECTOMY;  Bilateral Total Thyroidectomy ;  Surgeon: Beka Soto MD;  Location:  OR     TRANSPLANT KIDNEY RECIPIENT  DONOR  5/10/2016    Procedure: TRANSPLANT KIDNEY RECIPIENT  DONOR;  Surgeon: Ricky Deshpande MD;  Location:  OR     TRANSPLANT LIVER RECIPIENT  DONOR N/A 5/10/2016    Procedure: TRANSPLANT LIVER RECIPIENT  DONOR;  Surgeon: Ricky Deshpande MD;  Location:  OR     Family History   Problem Relation Age of Onset     Hypertension Brother      Hypertension Sister      Arthritis Father      Hypertension Father      Hypertension Mother      Hypertension Sister      Alcohol/Drug No family hx of      GASTROINTESTINAL DISEASE No family hx of      no fam hx of liver disease or liver cancer     Social History     Social History     Marital status: Single     Spouse name: N/A     Number of children: 0     Years  "of education: N/A     Occupational History      Unemployed     Social History Main Topics     Smoking status: Former Smoker     Packs/day: 0.33     Years: 3.00     Types: Cigarettes     Start date: 8/20/1997     Quit date: 9/21/2000     Smokeless tobacco: Former User     Alcohol use No      Comment: ~10 drinks per day for ten years, quit in April of 2014     Drug use: No     Sexual activity: Not Currently     Partners: Female     Birth control/ protection: None     Other Topics Concern     Not on file     Social History Narrative    ? Blood transfusion as baby during surgery,    Professional performed tattoos     No Illicit IV or intranasal drug use.      Current Facility-Administered Medications   Medication     ibuprofen (ADVIL/MOTRIN) tablet 600 mg     lactated ringers infusion     ondansetron (ZOFRAN-ODT) ODT tab 4 mg    Or     ondansetron (ZOFRAN) injection 4 mg     prochlorperazine (COMPAZINE) injection 5-10 mg     meperidine (DEMEROL) injection 12.5 mg     naloxone (NARCAN) injection 0.1-0.4 mg     fentaNYL (PF) (SUBLIMAZE) injection 25-50 mcg     ORAL Pain Medications -  may administer as ordered by surgeon for take home use     oxyCODONE-acetaminophen (PERCOCET) 5-325 MG per tablet 2 tablet     lidocaine (LMX4) kit     heparin 100 UNIT/ML injection 5 mL     Current Outpatient Prescriptions   Medication     lisinopril (PRINIVIL/ZESTRIL) 5 MG tablet     amLODIPine (NORVASC) 10 MG tablet     levothyroxine (SYNTHROID/LEVOTHROID) 150 MCG tablet     magnesium oxide (MAG-OX) 400 (241.3 MG) MG tablet     tacrolimus (PROGRAF - GENERIC EQUIVALENT) 1 MG capsule     predniSONE (DELTASONE) 5 MG tablet     sulfamethoxazole-trimethoprim (BACTRIM/SEPTRA) 400-80 MG per tablet     traMADol (ULTRAM) 50 MG tablet     traZODone (DESYREL) 50 MG tablet        Allergies   Allergen Reactions     Hydromorphone Itching     Objective:   /90  Temp 98  F (36.7  C) (Oral)  Resp 16  Ht 1.778 m (5' 10\")  Wt 96.7 kg (213 lb 3 oz) "  SpO2 97%  BMI 30.59 kg/m2  Gen: NAD   HEENT: NC/AT   Pulm: No labored breathing   GI: Not distended   Neuro: No focal defects     Assessment/Plan   Mr. Chen is a 37 year old with a PMHx of Liver and Kidney Transplant in 2016 with previous right sided UE fistula, HTN, anxiety, Large B Cell Lymphoma s/p Chemotherapy who is here for right arm AV fistula ligation.     #AV Fistula Ligation   -Patient will undergo AV Fistula ligation today given his symptoms of right arm steal.  -Procedure has been explained to the patient and he understands. Therefore, will proceed with surgery as planned.     Lady Wolff PGY-4   Cardiology Fellow   Pager: [phone]

## 2017-11-07 NOTE — IP AVS SNAPSHOT
Same Day Surgery [address] [address]    Phone:  [phone]                                       After Visit Summary   11/7/2017    Cricket Chen    MRN: [id number]           After Visit Summary Signature Page     I have received my discharge instructions, and my questions have been answered. I have discussed any challenges I see with this plan with the nurse or doctor.    ..........................................................................................................................................  Patient/Patient Representative Signature      ..........................................................................................................................................  Patient Representative Print Name and Relationship to Patient    ..................................................               ................................................  Date                                            Time    ..........................................................................................................................................  Reviewed by Signature/Title    ...................................................              ..............................................  Date                                                            Time

## 2017-11-07 NOTE — OP NOTE
DATE OF OPERATION:  11/07/2017.      LOCATION:  [facility] main operating room.      PREOPERATIVE DIAGNOSIS:  Aneurysmal fistula.      POSTOPERATIVE DIAGNOSIS:  Aneurysmal fistula.      PROCEDURE:  Fistula ligation, which is revision of fistula.      SURGEON:  Padmaja Eaton MD      FIRST ASSISTANT:  Baron Rowan MD      SECOND ASSISTANT:  Dr. Case of Cardiology      ANESTHESIA:  Local anesthesia with IV sedation.      FINDINGS:  A large aneurysmal right upper arm AV fistula.  After ligation, no further thrill in the fistula.  Attempt made to collapse the fistula and empty it at the time of surgery.      ESTIMATED BLOOD LOSS:  Less than 30 mL.      This was a clean surgery.       BRIEF CLINICAL HISTORY:   Mr. Cricket Chen is a 37-year-old male with a prior fistula placement for end-stage renal failure and hemodialysis.  Since then he has a functioning kidney transplant and liver transplant and is overall doing very well.  He is having symptoms of numbness from the fistula, likely related to some degree of steal and clearly had an aneurysmal fistula as well.  After a long discussion, decision made to ligate the fistula for quality of life and given that it is not useful at this point.      DESCRIPTION OF PROCEDURE:  The patient was prepped and draped for access to his arm on the right side.  A small incision was made and extending on the antecubital fossa, incision he already had at time of placement of the fistula, was extended down to subcutaneous tissue to expose the fistula vein.  The vein was dissected out circumferentially right beyond its arterial anastomosis.  We then made another counterincision at the level of the shoulder directly over the fistula as well and extended down to subcutaneous tissue and the fistula vein was again to circumferentially dissected.  Note that both incisions were pretreated with lidocaine 2% with epinephrine.  We at this point collapsed the fistula with an  Esmarch after ligating the inflow with a 2 interrupted 3-0 silk ties and then ligated the outflow with another two 3-0 silk ties.  With the fistula and then ligated on both ends and the vein collapsed, we then repaired both incisions.  At the antecubital fossa crease, we closed with interrupted nylon sutures, at the upper shoulder with 4-0 subcuticular and 3-0 Vicryl subcutaneous.  Procedure was well tolerated.         JOHANA DOZIER MD             D: 2017 10:34   T: 2017 11:32   MT: HANNAH      Name:     IJEOMA DOBSON   MRN:      [id number]        Account:        [id number]   :      1980           Procedure Date: 2017      Document: [id number]       cc: LATRICIA SPAIN MD

## 2017-11-07 NOTE — ANESTHESIA POSTPROCEDURE EVALUATION
Patient: Cricket Chen    Procedure(s):  Revision of Right Arm Arteriovenous Fistula  - Wound Class: I-Clean    Diagnosis:Malfunctioning Fistula   Diagnosis Additional Information: No value filed.    Anesthesia Type:  General, MAC    Note:  Anesthesia Post Evaluation    Patient location during evaluation: PACU  Patient participation: Able to fully participate in evaluation  Level of consciousness: awake and alert  Pain management: adequate  Airway patency: patent  Cardiovascular status: acceptable and hemodynamically stable  Respiratory status: acceptable  Hydration status: acceptable  PONV: none     Anesthetic complications: None          Last vitals:  Vitals:    11/07/17 0707 11/07/17 1057 11/07/17 1100   BP: 126/78 118/74    Resp: 15 14 14   Temp: 36.6  C (97.9  F) 36.6  C (97.8  F)    SpO2: 98% 95%          Electronically Signed By: Claude Jasmine MD  November 7, 2017  11:33 AM

## 2017-11-07 NOTE — ANESTHESIA PREPROCEDURE EVALUATION
Anesthesia Plan      History & Physical Review  History and physical reviewed and following examination; no interval change.    ASA Status:  3 .    NPO Status:  > 8 hours    Plan for General and MAC with Propofol induction. Maintenance will be Balanced.  Reason for MAC:  Chronic cardiopulmonary disease (G9)         Postoperative Care      Consents            Allergies   Allergen Reactions     Hydromorphone Itching     Prescription Medications as of 11/7/2017             lisinopril (PRINIVIL/ZESTRIL) 5 MG tablet TAKE ONE TABLET BY MOUTH EVERY DAY    amLODIPine (NORVASC) 10 MG tablet TAKE ONE TABLET BY MOUTH EVERY DAY    traMADol (ULTRAM) 50 MG tablet Take 1 tablet (50 mg) by mouth every 8 hours as needed for moderate pain    levothyroxine (SYNTHROID/LEVOTHROID) 150 MCG tablet Take 1 tablet (150 mcg) by mouth daily    magnesium oxide (MAG-OX) 400 (241.3 MG) MG tablet Take 1 tablet (400 mg) by mouth 2 times daily    tacrolimus (PROGRAF - GENERIC EQUIVALENT) 1 MG capsule Take 2 capsules (2 mg) by mouth 2 times daily    predniSONE (DELTASONE) 5 MG tablet Take 1 tablet (5 mg) by mouth daily    sulfamethoxazole-trimethoprim (BACTRIM/SEPTRA) 400-80 MG per tablet Take 1 tablet by mouth daily    traZODone (DESYREL) 50 MG tablet Take 0.5-2 tablets ( mg) by mouth At Bedtime      No results found for this or any previous visit (from the past 4320 hour(s)).  PAST MEDICAL HISTORY:   Past Medical History:   Diagnosis Date     Alcohol abuse     Last drink in Mid-April 2014     Anemia in ESRD (end-stage renal disease) (H)      Anxiety 2008     Atrial flutter (H)      Cirrhosis (H)     S/P liver transplant     Depression      History of blood transfusion      History of transposition of great vessels     atrial switch at age 8 months old     Hypertension      Liver transplant recipient (H)     2016     Papillary thyroid carcinoma (H)      Pneumonia 11-15-14     Renal transplant recipient     2016      Varices, esophageal (H)        PAST SURGICAL HISTORY:   Past Surgical History:   Procedure Laterality Date     BENCH LIVER N/A 5/10/2016    Procedure: BENCH LIVER;  Surgeon: Ricky Deshpande MD;  [facility]     BIOPSY LYMPH NODE CERVICAL Right 1/26/2017    Procedure: BIOPSY LYMPH NODE CERVICAL;  Surgeon: Beka Soto MD;  [facility]: [facility]     CARDIAC SURGERY  9-10-80     CREATE FISTULA ARTERIOVENOUS UPPER EXTREMITY Right 9/16/2014    Procedure: CREATE FISTULA ARTERIOVENOUS UPPER EXTREMITY;  Surgeon: Padmaja Eaton MD;  Location: [facility] OR     CYSTOSCOPY, REMOVE STENT(S), COMBINED Right 6/22/2016    Procedure: COMBINED CYSTOSCOPY, REMOVE STENT(S);  Surgeon: Ricky Deshpande MD;  [facility]: [facility] OR     ENT SURGERY       ESOPHAGOSCOPY, GASTROSCOPY, DUODENOSCOPY (EGD), COMBINED  5/30/2014    Procedure: COMBINED ESOPHAGOSCOPY, GASTROSCOPY, DUODENOSCOPY (EGD);  Surgeon: Guillaume Bautista MD;  Location:  GI     ESOPHAGOSCOPY, GASTROSCOPY, DUODENOSCOPY (EGD), COMBINED  9/30/14     ESOPHAGOSCOPY, GASTROSCOPY, DUODENOSCOPY (EGD), COMBINED Left 3/12/2015    Procedure: COMBINED ESOPHAGOSCOPY, GASTROSCOPY, DUODENOSCOPY (EGD);  Surgeon: Laureen Galvan MD;  Location:  GI     ESOPHAGOSCOPY, GASTROSCOPY, DUODENOSCOPY (EGD), COMBINED N/A 4/21/2016    Procedure: COMBINED ESOPHAGOSCOPY, GASTROSCOPY, DUODENOSCOPY (EGD);  Surgeon: Laureen Galvan MD;  Location:  GI     ESOPHAGOSCOPY, GASTROSCOPY, DUODENOSCOPY (EGD), COMBINED N/A 7/21/2016    Procedure: COMBINED ESOPHAGOSCOPY, GASTROSCOPY, DUODENOSCOPY (EGD);  Surgeon: Laureen Galvan MD;  Location:  GI     HC OR CATH ABLATION NON-CARDIAC ENDOVASCULAR  1990,2002    SVT     INSERT PORT VASCULAR ACCESS N/A 4/3/2017    Procedure: INSERT PORT VASCULAR ACCESS;  Surgeon: Rajendra Jacques PA-C;  [facility]     PICC INSERTION  5/30/14    PICC line placement 5/30/14; Removal 6/9/2014     THORACIC SURGERY  1980    Transposition great  arteries, repaired at 8 months     THYROIDECTOMY Right 2017    Procedure: THYROIDECTOMY;  Bilateral Total Thyroidectomy ;  Surgeon: Beka Soto MD;  Location: [facility]     TRANSPLANT KIDNEY RECIPIENT  DONOR  5/10/2016    Procedure: TRANSPLANT KIDNEY RECIPIENT  DONOR;  Surgeon: Ricky Deshpande MD;  [facility]     TRANSPLANT LIVER RECIPIENT  DONOR N/A 5/10/2016    Procedure: TRANSPLANT LIVER RECIPIENT  DONOR;  Surgeon: Ricky Deshpande MD;  [facility]       FAMILY HISTORY:   Family History   Problem Relation Age of Onset     Hypertension Brother      Hypertension Sister      Arthritis Father      Hypertension Father      Hypertension Mother      Hypertension Sister      Alcohol/Drug No family hx of      GASTROINTESTINAL DISEASE No family hx of      no fam hx of liver disease or liver cancer       SOCIAL HISTORY:   Social History   Substance Use Topics     Smoking status: Former Smoker     Packs/day: 0.33     Years: 3.00     Types: Cigarettes     Start date: 1997     Quit date: 2000     Smokeless tobacco: Former User     Alcohol use No      Comment: ~10 drinks per day for ten years, quit in 2014     Lab Results   Component Value Date    WBC 3.1 (L) 10/26/2017    HGB 14.6 10/26/2017    HCT 43.4 10/26/2017     (L) 10/26/2017    CHOL 195 10/14/2014    TRIG 99 10/14/2014    HDL 55 10/14/2014    ALT 30 10/26/2017    AST 16 10/26/2017     10/26/2017    BUN 22 10/26/2017    CO2 22 10/26/2017    TSH 0.13 (L) 2017    PSA 0.65 10/14/2014    INR 1.09 2017     Prescriptions Prior to Admission   Medication Sig Dispense Refill Last Dose     lisinopril (PRINIVIL/ZESTRIL) 5 MG tablet TAKE ONE TABLET BY MOUTH EVERY DAY 90 tablet 3 Taking     amLODIPine (NORVASC) 10 MG tablet TAKE ONE TABLET BY MOUTH EVERY DAY 90 tablet 3 Taking     traMADol (ULTRAM) 50 MG tablet Take 1 tablet (50 mg) by mouth every 8 hours as needed for moderate pain 45  tablet 0 Taking     levothyroxine (SYNTHROID/LEVOTHROID) 150 MCG tablet Take 1 tablet (150 mcg) by mouth daily 90 tablet 4 Taking     magnesium oxide (MAG-OX) 400 (241.3 MG) MG tablet Take 1 tablet (400 mg) by mouth 2 times daily 120 tablet 11 Taking     tacrolimus (PROGRAF - GENERIC EQUIVALENT) 1 MG capsule Take 2 capsules (2 mg) by mouth 2 times daily 120 capsule 11 Taking     predniSONE (DELTASONE) 5 MG tablet Take 1 tablet (5 mg) by mouth daily 90 tablet 3 Taking     sulfamethoxazole-trimethoprim (BACTRIM/SEPTRA) 400-80 MG per tablet Take 1 tablet by mouth daily 30 tablet 11 Taking     traZODone (DESYREL) 50 MG tablet Take 0.5-2 tablets ( mg) by mouth At Bedtime 30 tablet 1 Taking                       .

## 2017-11-07 NOTE — BRIEF OP NOTE
[facility] Brief Operative Note    Pre-operative diagnosis: Malfunctioning Fistula    Post-operative diagnosis Same   Procedure: Procedure(s):  Revision of Right Arm Arteriovenous Fistula  - Wound Class: I-Clean   Surgeon(s): Surgeon(s) and Role:     * Padmaja Eaton MD - Primary     * Baron Rowan MD - Assisting   Estimated blood loss: 5 mL    Specimens: No   Findings: Right arm fistula ligation. Vein doubly ligated proximally and distally with 0 silk.          Baron Rowan MD   Vascular Surgery Fellow  Pager: [phone]

## 2017-11-08 ENCOUNTER — TELEPHONE (OUTPATIENT)
Dept: VASCULAR SURGERY | Facility: CLINIC | Age: 37
End: 2017-11-08

## 2017-11-08 NOTE — TELEPHONE ENCOUNTER
I received a message to contact Cricket regarding concerns of right arm swelling.  I spoke with Cricket who states his right hand is swollen this morning.  He denies any discoloration to his finger tips or temperature changes.   He stated the post op nurse in phase II took off the wrap on his hand yesterday per his request and his arm is wrapped starting at the wrist.  Advised patient to remove the ace wrap and re-wrap his arm with ace wrap starting at his fingertips. If symptoms don't improve he can remove the Kerlix and Coban and just apply the ace wrap.  Explained to patient that the goal of the dressing is to help compress the ligated fistula so it clots off.  I provided him with my direct office number to contact me if any additional questions or concerns arise.  He will be seen in clinic tomorrow to remove the dressing.     Emi STEARNS, CNS  Division of Vascular Surgery  [facility]  Pager [phone]

## 2017-11-09 ENCOUNTER — OFFICE VISIT (OUTPATIENT)
Dept: VASCULAR SURGERY | Facility: CLINIC | Age: 37
End: 2017-11-09

## 2017-11-09 VITALS
DIASTOLIC BLOOD PRESSURE: 87 MMHG | RESPIRATION RATE: 16 BRPM | HEART RATE: 73 BPM | SYSTOLIC BLOOD PRESSURE: 125 MMHG | OXYGEN SATURATION: 97 %

## 2017-11-09 DIAGNOSIS — T82.898A STEAL SYNDROME AS COMPLICATION OF DIALYSIS ACCESS, INITIAL ENCOUNTER (H): Primary | ICD-10-CM

## 2017-11-09 ASSESSMENT — PAIN SCALES - GENERAL: PAINLEVEL: NO PAIN (0)

## 2017-11-09 NOTE — NURSING NOTE
Chief Complaint   Patient presents with     RECHECK     Follow up right arm fistula        Vitals:    11/09/17 1304   BP: 125/87   BP Location: Left arm   Pulse: 73   Resp: 16   SpO2: 97%       There is no height or weight on file to calculate BMI.                 Kaylan Corado LPN

## 2017-11-09 NOTE — PATIENT INSTRUCTIONS
Please call me with any questions or concerns.    I will see you back next Thursday 11/16 at 1pm for suture removal.    Beth Mcclelland RN  [phone]

## 2017-11-09 NOTE — LETTER
11/9/2017       RE: Cricket Chen  [address]  [place] [address]     Dear Colleague,    Thank you for referring your patient, Cricket Chen, to the [facility] at [facility]. Please see a copy of my visit note below.    Mr Chen is being seen in follow-up after ligation of his non-utilized right upper arm AV fistula. The procedure was uncomplicated and he was discharged home on the same day. He has had a lot of trouble with the compression wrap that we place for him. It appears it was removed from his hand as a result his hand swelled significantly. The wrap was completely taken off now and he is doing well. With no real problems.    /87 (BP Location: Left arm)  Pulse 73  Resp 16  SpO2 97%    No pulsatile flow in the fistula. Areas of aneurysmal degeneration are largely resolved and collapsed. 2 small incisions at the antecubital fossa and at the shoulder are healing nicely.    IMP/PLAN: Arm rewrapped with Kerlix and Coban and Ace. Instructions given to leave the Kerlix and Coban and on for a week and rewrapped the Ace as necessary. I purposely wrapped in Kerlix and Coban very loosely so that they would not be uncomfortable. He will return next week to see Beth to have his sutures removed. Follow-up after that will be p.r.n.      Again, thank you for allowing me to participate in the care of your patient.      Sincerely,    Padmaja Eaton MD

## 2017-11-09 NOTE — MR AVS SNAPSHOT
After Visit Summary   11/9/2017    Cricket Chen    MRN: [id number]           Patient Information     Date Of Birth          1980        Visit Information        Provider Department      11/9/2017 1:00 PM Padmaja Eaton MD [facility]        Today's Diagnoses     Steal syndrome as complication of dialysis access, initial encounter (H)    -  1      Care Instructions    Please call me with any questions or concerns.    I will see you back next Thursday 11/16 at 1pm for suture removal.    Beth Mcclelland RN  [phone]          Follow-ups after your visit        Follow-up notes from your care team     Return in about 1 week (around 11/16/2017).      Your next 10 appointments already scheduled     Jan 16, 2018 12:10 PM CST   (Arrive by 11:55 AM)   Return Liver Transplant with Laureen Galvan MD   [facility] Hepatology ([facility])    [address] [place] [id number]   [phone]            Jan 16, 2018  2:20 PM CST   (Arrive by 1:50 PM)   Return Kidney Transplant with Jake Sidhu MD   [facility] Nephrology ([facility])    [address] [place] [id number]   [phone]            Jan 30, 2018 12:00 PM CST   (Arrive by 11:45 AM)   CT CHEST/ABDOMEN/PELVIS W CONTRAST with UCCT1   [facility] Imaging [place] [facility] ([facility])    [address] [place] [id number] [phone]           Please bring any scans or X-rays taken at other hospitals, if similar tests were done. Also bring a list of your medicines, including vitamins, minerals and over-the-counter drugs. It is safest to leave personal items at home.  Be sure to tell your doctor:   If you have any allergies.   If there s any chance you are pregnant.   If you are breastfeeding.   If you have any special needs.  You may have contrast for this exam. To prepare:   Do not  eat or drink for 2 hours before your exam. If you need to take medicine, you may take it with small sips of water. (We may ask you to take liquid medicine as well.)   The day before your exam, drink extra fluids at least six 8-ounce glasses (unless your doctor tells you to restrict your fluids).  Patients over 70 or patients with diabetes or kidney problems:   If you haven t had a blood test (creatinine test) within the last 30 days, go to your clinic or Diagnostic Imaging Department for this test.  If you have diabetes:   If your kidney function is normal, continue taking your metformin (Avandamet, Glucophage, Glucovance, Metaglip) on the day of your exam.   If your kidney function is abnormal, wait 48 hours before restarting this medicine.  You will have oral contrast for this exam:   You will drink the contrast at home. Get this from your clinic or Diagnostic Imaging Department. Please follow the directions given.  Please wear loose clothing, such as a sweat suit or jogging clothes. Avoid snaps, zippers and other metal. We may ask you to undress and put on a hospital gown.  If you have any questions, please call the Imaging Department where you will have your exam.            Feb 01, 2018  3:00 PM CST   [facility] with  [facility]   [facility] Draw ([facility])    [address] [place] [id number] [phone]            Feb 01, 2018  3:30 PM CST   RETURN ONC with Girish Narayanan MD   [facility] Blood and Marrow Transplant ([facility])    [address] [place] [id number] [phone]            Feb 09, 2018  1:50 PM CST   (Arrive by 1:35 PM)   RETURN ENDOCRINE with Zuleyma Gray MD   [facility] Endocrinology ([facility])    [address] [place] [id number] [phone]              Who to contact     Please call your clinic  at [phone] to:    Ask questions about your health    Make or cancel appointments    Discuss your medicines    Learn about your test results    Speak to your doctor   If you have compliments or concerns about an experience at your clinic, or if you wish to file a complaint, please contact [facility] Patient Relations at [phone] or email us at [email or web address]         Additional Information About Your Visit        OnAir Playerhart Information     hiket gives you secure access to your electronic health record. If you see a primary care provider, you can also send messages to your care team and make appointments. If you have questions, please call your primary care clinic.  If you do not have a primary care provider, please call [phone] and they will assist you.      Stat is an electronic gateway that provides easy, online access to your medical records. With Stat, you can request a clinic appointment, read your test results, renew a prescription or communicate with your care team.     To access your existing account, please contact your [facility] or call [phone] for assistance.        Care EveryWhere ID     This is your Care EveryWhere ID. This could be used by other organizations to access your [place] medical records  [id number]        Your Vitals Were     Pulse Respirations Pulse Oximetry             73 16 97%          Blood Pressure from Last 3 Encounters:   11/09/17 125/87   11/07/17 130/90   10/26/17 136/72    Weight from Last 3 Encounters:   11/07/17 213 lb 3 oz   10/26/17 212 lb 6.4 oz   08/25/17 206 lb 8 oz              Today, you had the following     No orders found for display         Today's Medication Changes          These changes are accurate as of: 11/9/17  1:28 PM.  If you have any questions, ask your nurse or doctor.               Stop taking these medicines if you haven't already. Please contact your care  team if you have questions.     traZODone 50 MG tablet   Commonly known as:  DESYREL   Stopped by:  Padmaja Eaton MD                    Primary Care Provider    Physician No Ref-Primary       NO REF-PRIMARY PHYSICIAN        Equal Access to Services     GRETELSHAWN MAHOGANY : Hadii steffany jurado jaydon Barroso, waraoulda luqadaha, qaybta kaalmada ramy, robina brown adamsusie vaughn christopher sibley. So [facility] [phone].    ATENCIÓN: Si habla español, tiene a laura disposición servicios gratuitos de asistencia lingüística. Llame al [phone].    We comply with applicable federal civil rights laws and [place] laws. We do not discriminate on the basis of race, color, national origin, age, disability, sex, sexual orientation, or gender identity.            Thank you!     Thank you for choosing [facility]  for your care. Our goal is always to provide you with excellent care. Hearing back from our patients is one way we can continue to improve our services. Please take a few minutes to complete the written survey that you may receive in the mail after your visit with us. Thank you!             Your Updated Medication List - Protect others around you: Learn how to safely use, store and throw away your medicines at www.disposemymeds.org.          This list is accurate as of: 11/9/17  1:28 PM.  Always use your most recent med list.                   Brand Name Dispense Instructions for use Diagnosis    amLODIPine 10 MG tablet    NORVASC    90 tablet    TAKE ONE TABLET BY MOUTH EVERY DAY    HTN (hypertension)       levothyroxine 150 MCG tablet    SYNTHROID/LEVOTHROID    90 tablet    Take 1 tablet (150 mcg) by mouth daily    Papillary thyroid carcinoma (H)       lisinopril 5 MG tablet    PRINIVIL/ZESTRIL    90 tablet    TAKE ONE TABLET BY MOUTH EVERY DAY    HTN (hypertension)       magnesium oxide 400 (241.3 MG) MG tablet    MAG-OX    120 tablet    Take 1 tablet (400 mg) by mouth 2 times daily    Hypomagnesemia        predniSONE 5 MG tablet    DELTASONE    90 tablet    Take 1 tablet (5 mg) by mouth daily    Liver replaced by transplant (H), S/P kidney transplant       sulfamethoxazole-trimethoprim 400-80 MG per tablet    BACTRIM/SEPTRA    30 tablet    Take 1 tablet by mouth daily    Liver replaced by transplant (H), S/P kidney transplant       tacrolimus 1 MG capsule    GENERIC EQUIVALENT    120 capsule    Take 2 capsules (2 mg) by mouth 2 times daily    Liver replaced by transplant (H), S/P kidney transplant       traMADol 50 MG tablet    ULTRAM    45 tablet    Take 1 tablet (50 mg) by mouth every 8 hours as needed for moderate pain    Chronic pain of left knee, Papillary thyroid carcinoma (H)

## 2017-11-13 ENCOUNTER — TELEPHONE (OUTPATIENT)
Dept: DERMATOLOGY | Facility: CLINIC | Age: 37
End: 2017-11-13

## 2017-11-13 NOTE — TELEPHONE ENCOUNTER
Pt called with concerns of right arm AV fistula which recently under went ligation.  Pt states he is experiencing increased amount pain from his elbow to his shoulder.  States he has taken tylenol, done cool and warm compresses, but it doesn't seem to help.  Reports having full mobility of right arm with no discoloration. Writer Spoke with Dr. Sosa, who recommended pt try Advil 400 mg every 6 hours PRN and continue with warm compresses.  Also told pt if pain becomes intolerable to go to ER for evaluation.

## 2017-11-14 ENCOUNTER — TELEPHONE (OUTPATIENT)
Dept: VASCULAR SURGERY | Facility: CLINIC | Age: 37
End: 2017-11-14

## 2017-11-15 NOTE — TELEPHONE ENCOUNTER
Received voice message from Cricket: he reports that pain is much improved at this time.  He is taking the advil as  recommended by Dr Sosa and it has worked very well for him.    He will come in tomorrow (Thursday) for suture removal.

## 2017-11-16 ENCOUNTER — OFFICE VISIT (OUTPATIENT)
Dept: TRANSPLANT | Facility: CLINIC | Age: 37
End: 2017-11-16
Attending: CLINICAL NURSE SPECIALIST
Payer: COMMERCIAL

## 2017-11-16 VITALS
DIASTOLIC BLOOD PRESSURE: 82 MMHG | HEART RATE: 73 BPM | SYSTOLIC BLOOD PRESSURE: 135 MMHG | RESPIRATION RATE: 16 BRPM | OXYGEN SATURATION: 100 %

## 2017-11-16 DIAGNOSIS — Z94.4 LIVER REPLACED BY TRANSPLANT (H): Primary | ICD-10-CM

## 2017-11-16 PROCEDURE — 99211 OFF/OP EST MAY X REQ PHY/QHP: CPT | Mod: ZF

## 2017-11-16 ASSESSMENT — PAIN SCALES - GENERAL: PAINLEVEL: MILD PAIN (2)

## 2017-11-16 NOTE — LETTER
11/16/2017       RE: Cricket Chen  [address]  [facility] [address]     Dear Colleague,    Thank you for referring your patient, Cricket Chen, to the [facility] SOLID ORGAN TRANSPLANT at [facility]. Please see a copy of my visit note below.    Dialysis Access Service   Progress Note    S:  Mr. Chen is being seen today for surgical followup of his dialysis access.  He reports no issues with the wound, and no steal syndrome of the distal extremity. Swelling in right arm and elbow crease incision did not have much change since clinic visit with Dr. Eaton last week. He wraps the entire right arm with ACE bandages and elevate right arm with support as instructed. C/O mild irritation from dressing gauze cover AC fossa crease incision. Denies pain in right arm, tingling/numbness or a change of color/temperature in right arm. S/P  Fistula ligation, which is revision of fistula on 11/7/2017.    O:  Pulse:  [73] 73  Resp:  [16] 16  BP: (135)/(82) 135/82  SpO2:  [100 %] 100 %  GENERAL: alert  Circulation:   Radial pulse 3+  Ulnar pulse  3+   Capillary refill:  mild venous hypertension    Sensory exam:   arm: Normal   [x]           Abnormal   []          Comment:    hand: Normal   [x]           Abnormal   []          Comment:   Motor exam:   arm: Normal   [x]           Abnormal   []          Comment:    hand: Normal   [x]           Abnormal   []          Comment:    Access: R upper extremity wound(s) healed non-tender, sutures intact without drainage noted. AC fossa crease incision wound clean and dry, a small fluid collection appears at the lateral aspect of incision site. Mild to moderate venous hypertension in right arm without apparent infection. Negative thrill of AV fistula.     Assessment & Plan: Mr. Chen's dialysis access wound  has healed well with Mild to moderate venous hypertension at this time point.    1. Remove sutures from incision site today. Apply  steri strip  2. Wrap entire right arm with ACE Compression bandages as tolerated  3. Elevate right arm with support as tolerated  4. F/U with Dr. Eaton in 2 weeks to re-assess right arm swelling    We would like to see the patient back in the clinic in 2 weeks time to assess progress. The patient was counselled to contact our nurse coordinator, DARYN Velazco CNS (Sum) at [phone] with any questions or concerns.  Thank you for the opportunity to participate in Mr. Chen's care.    TT: 15 min  CT: 15 min    JORDEN Choudhury (Sum)  Dialysis access program   [facility]  Pager # [phone]

## 2017-11-16 NOTE — PROGRESS NOTES
Dialysis Access Service   Progress Note    S:  Mr. Chen is being seen today for surgical followup of his dialysis access.  He reports no issues with the wound, and no steal syndrome of the distal extremity. Swelling in right arm and elbow crease incision did not have much change since clinic visit with Dr. Eaton last week. He wraps the entire right arm with ACE bandages and elevate right arm with support as instructed. C/O mild irritation from dressing gauze cover AC fossa crease incision. Denies pain in right arm, tingling/numbness or a change of color/temperature in right arm. S/P  Fistula ligation, which is revision of fistula on 11/7/2017.    O:  Pulse:  [73] 73  Resp:  [16] 16  BP: (135)/(82) 135/82  SpO2:  [100 %] 100 %  GENERAL: alert  Circulation:   Radial pulse 3+  Ulnar pulse  3+   Capillary refill:  mild venous hypertension    Sensory exam:   arm: Normal   [x]           Abnormal   []          Comment:    hand: Normal   [x]           Abnormal   []          Comment:   Motor exam:   arm: Normal   [x]           Abnormal   []          Comment:    hand: Normal   [x]           Abnormal   []          Comment:    Access: R upper extremity wound(s) healed non-tender, sutures intact without drainage noted. AC fossa crease incision wound clean and dry, a small fluid collection appears at the lateral aspect of incision site. Mild to moderate venous hypertension in right arm without apparent infection. Negative thrill of AV fistula.     Assessment & Plan: Mr. Chen's dialysis access wound  has healed well with Mild to moderate venous hypertension at this time point.    1. Remove sutures from incision site today. Apply steri strip  2. Wrap entire right arm with ACE Compression bandages as tolerated  3. Elevate right arm with support as tolerated  4. F/U with Dr. Eaton in 2 weeks to re-assess right arm swelling    We would like to see the patient back in the clinic in 2 weeks time to assess progress. The patient  was counselled to contact our nurse coordinator, DARYN Velazco CNS (Sum) at [phone] with any questions or concerns.  Thank you for the opportunity to participate in Mr. Chen's care.    TT: 15 min  CT: 15 min    JORDEN Choudhury (Sum)  Dialysis access program   [facility]  Pager # [phone]

## 2017-11-16 NOTE — LETTER
11/16/2017       RE: Cricket Chen  [address]  [place] [place] [address]     Dear Colleague,    Thank you for referring your patient, Cricket Chen, to the [facility] SOLID ORGAN TRANSPLANT at [facility]. Please see a copy of my visit note below.    Dialysis Access Service   Progress Note    S:  Mr. Chen is being seen today for surgical followup of his dialysis access.  He reports no issues with the wound, and no steal syndrome of the distal extremity. Swelling in right arm and elbow crease incision did not have much change since clinic visit with Dr. Eaton last week. He wraps the entire right arm with ACE bandages and elevate right arm with support as instructed. C/O mild irritation from dressing gauze cover AC fossa crease incision. Denies pain in right arm, tingling/numbness or a change of color/temperature in right arm. S/P  Fistula ligation, which is revision of fistula on 11/7/2017.    O:  Pulse:  [73] 73  Resp:  [16] 16  BP: (135)/(82) 135/82  SpO2:  [100 %] 100 %  GENERAL: alert  Circulation:   Radial pulse 3+  Ulnar pulse  3+   Capillary refill:  mild venous hypertension    Sensory exam:   arm: Normal   [x]           Abnormal   []          Comment:    hand: Normal   [x]           Abnormal   []          Comment:   Motor exam:   arm: Normal   [x]           Abnormal   []          Comment:    hand: Normal   [x]           Abnormal   []          Comment:    Access: R upper extremity wound(s) healed non-tender, sutures intact without drainage noted. AC fossa crease incision wound clean and dry, a small fluid collection appears at the lateral aspect of incision site. Mild to moderate venous hypertension in right arm without apparent infection. Negative thrill of AV fistula.     Assessment & Plan: Mr. Chen's dialysis access wound  has healed well with Mild to moderate venous hypertension at this time point.    1. Remove sutures from incision site today. Apply  steri strip  2. Wrap entire right arm with ACE Compression bandages as tolerated  3. Elevate right arm with support as tolerated  4. F/U with Dr. Eaton in 2 weeks to re-assess right arm swelling    We would like to see the patient back in the clinic in 2 weeks time to assess progress. The patient was counselled to contact our nurse coordinator, DARYN Velazco CNS (Sum) at [phone] with any questions or concerns.  Thank you for the opportunity to participate in Mr. Chen's care.    TT: 15 min  CT: 15 min    JORDEN Choudhury (Sum)  Dialysis access program   [facility]  Pager # [phone]    Again, thank you for allowing me to participate in the care of your patient.      Sincerely,    DARYN Cruz

## 2017-11-16 NOTE — MR AVS SNAPSHOT
After Visit Summary   11/16/2017    Cricket Chen    MRN: [id number]           Patient Information     Date Of Birth          1980        Visit Information        Provider Department      11/16/2017 2:00 PM Mary Anne Cordoba APRN [facility]        Today's Diagnoses     Liver replaced by transplant (H)    -  1       Follow-ups after your visit        Your next 10 appointments already scheduled     Jan 16, 2018 12:10 PM CST   (Arrive by 11:55 AM)   Return Liver Transplant with Laureen Galvan MD   [facility] Hepatology ([facility])    [address] [place] [address]   [phone]            Jan 16, 2018  2:20 PM CST   (Arrive by 1:50 PM)   Return Kidney Transplant with Jake Sidhu MD   [facility] Nephrology ([facility])    [address] [place] [address]   [phone]            Jan 30, 2018 12:00 PM CST   (Arrive by 11:45 AM)   CT CHEST/ABDOMEN/PELVIS W CONTRAST with UCCT1   [facility] Imaging [place] [facility] ([facility])    [address] [place] [address]   [phone]           Please bring any scans or X-rays taken at other hospitals, if similar tests were done. Also bring a list of your medicines, including vitamins, minerals and over-the-counter drugs. It is safest to leave personal items at home.  Be sure to tell your doctor:   If you have any allergies.   If there s any chance you are pregnant.   If you are breastfeeding.   If you have any special needs.  You may have contrast for this exam. To prepare:   Do not eat or drink for 2 hours before your exam. If you need to take medicine, you may take it with small sips of water. (We may ask you to take liquid medicine as well.)   The day before your exam, drink extra fluids at least six 8-ounce glasses (unless your doctor tells you to restrict your fluids).   Patients over 70 or patients with diabetes or kidney problems:   If you haven t had a blood test (creatinine test) within the last 30 days, go to your clinic or Diagnostic Imaging Department for this test.  If you have diabetes:   If your kidney function is normal, continue taking your metformin (Avandamet, Glucophage, Glucovance, Metaglip) on the day of your exam.   If your kidney function is abnormal, wait 48 hours before restarting this medicine.  You will have oral contrast for this exam:   You will drink the contrast at home. Get this from your clinic or Diagnostic Imaging Department. Please follow the directions given.  Please wear loose clothing, such as a sweat suit or jogging clothes. Avoid snaps, zippers and other metal. We may ask you to undress and put on a hospital gown.  If you have any questions, please call the Imaging Department where you will have your exam.            Feb 01, 2018  3:00 PM CST   Masonic Lab Draw with  MASONIC LAB DRAW   [facility] Masonic Lab Draw ([facility])    [address] [place] [address] [phone]            Feb 01, 2018  3:30 PM CST   RETURN ONC with Girish Naraaynan MD   [facility] Blood and Marrow Transplant ([facility])    [address] [place] [address] [phone]            Feb 09, 2018  1:50 PM CST   (Arrive by 1:35 PM)   RETURN ENDOCRINE with Zuleyma Gray MD   [facility] Endocrinology ([facility])    [address] [place] [address] [phone]              Who to contact     If you have questions or need follow up information about today's clinic visit or your schedule please contact [facility]GAN TRANSPLANT directly at [phone].  Normal or non-critical lab and imaging results will be communicated to you by MyChart, letter or phone within 4 business days after the clinic has  received the results. If you do not hear from us within 7 days, please contact the clinic through [employer] or phone. If you have a critical or abnormal lab result, we will notify you by phone as soon as possible.  Submit refill requests through [employer] or call your pharmacy and they will forward the refill request to us. Please allow 3 business days for your refill to be completed.          Additional Information About Your Visit        [employer] Information     [employer] gives you secure access to your electronic health record. If you see a primary care provider, you can also send messages to your care team and make appointments. If you have questions, please call your primary care clinic.  If you do not have a primary care provider, please call [phone] and they will assist you.        Care EveryWhere ID     This is your Care EveryWhere ID. This could be used by other organizations to access your [place] medical records  [id number]        Your Vitals Were     Pulse Respirations Pulse Oximetry             73 16 100%          Blood Pressure from Last 3 Encounters:   11/16/17 135/82   11/09/17 125/87   11/07/17 130/90    Weight from Last 3 Encounters:   11/07/17 96.7 kg (213 lb 3 oz)   10/26/17 96.3 kg (212 lb 6.4 oz)   08/25/17 93.7 kg (206 lb 8 oz)              Today, you had the following     No orders found for display       Primary Care Provider    Physician No Ref-Primary       NO REF-PRIMARY PHYSICIAN        Equal Access to Services     [facility] : Hadii steffany ku hadasho Sosalty, waaxda luqadaha, qaybta kaalmada ramy, robina white . So [facility] [phone].    ATENCIÓN: Si habla español, tiene a laura disposición servicios gratuitos de asistencia lingüística. Jaelyn al [phone].    We comply with applicable federal civil rights laws and [place] laws. We do not discriminate on the basis of race, color, national origin, age, disability, sex, sexual orientation, or gender  identity.            Thank you!     Thank you for choosing [facility] SOL[facility] ORGAN TRANSPLANT  for your care. Our goal is always to provide you with excellent care. Hearing back from our patients is one way we can continue to improve our services. Please take a few minutes to complete the written survey that you may receive in the mail after your visit with us. Thank you!             Your Updated Medication List - Protect others around you: Learn how to safely use, store and throw away your medicines at www.disposemymeds.org.          This list is accurate as of: 11/16/17 11:59 PM.  Always use your most recent med list.                   Brand Name Dispense Instructions for use Diagnosis    amLODIPine 10 MG tablet    NORVASC    90 tablet    TAKE ONE TABLET BY MOUTH EVERY DAY    HTN (hypertension)       levothyroxine 150 MCG tablet    SYNTHROID/LEVOTHROID    90 tablet    Take 1 tablet (150 mcg) by mouth daily    Papillary thyroid carcinoma (H)       lisinopril 5 MG tablet    PRINIVIL/ZESTRIL    90 tablet    TAKE ONE TABLET BY MOUTH EVERY DAY    HTN (hypertension)       magnesium oxide 400 (241.3 MG) MG tablet    MAG-OX    120 tablet    Take 1 tablet (400 mg) by mouth 2 times daily    Hypomagnesemia       predniSONE 5 MG tablet    DELTASONE    90 tablet    Take 1 tablet (5 mg) by mouth daily    Liver replaced by transplant (H), S/P kidney transplant       sulfamethoxazole-trimethoprim 400-80 MG per tablet    BACTRIM/SEPTRA    30 tablet    Take 1 tablet by mouth daily    Liver replaced by transplant (H), S/P kidney transplant       tacrolimus 1 MG capsule    GENERIC EQUIVALENT    120 capsule    Take 2 capsules (2 mg) by mouth 2 times daily    Liver replaced by transplant (H), S/P kidney transplant       traMADol 50 MG tablet    ULTRAM    45 tablet    Take 1 tablet (50 mg) by mouth every 8 hours as needed for moderate pain    Chronic pain of left knee, Papillary thyroid carcinoma (H)

## 2017-12-07 ENCOUNTER — OFFICE VISIT (OUTPATIENT)
Dept: VASCULAR SURGERY | Facility: CLINIC | Age: 37
End: 2017-12-07

## 2017-12-07 VITALS
OXYGEN SATURATION: 95 % | DIASTOLIC BLOOD PRESSURE: 85 MMHG | HEART RATE: 73 BPM | SYSTOLIC BLOOD PRESSURE: 132 MMHG | RESPIRATION RATE: 18 BRPM

## 2017-12-07 DIAGNOSIS — T82.898A STEAL SYNDROME AS COMPLICATION OF DIALYSIS ACCESS, INITIAL ENCOUNTER (H): Primary | ICD-10-CM

## 2017-12-07 ASSESSMENT — PAIN SCALES - GENERAL: PAINLEVEL: MODERATE PAIN (4)

## 2017-12-07 NOTE — LETTER
12/7/2017       RE: Cricket Chen  [address]  [place] [address]     Dear Colleague,    Thank you for referring your patient, Cricket Chen, to the [facility] VASCULAR CLINIC at [facility]. Please see a copy of my visit note below.    VASCULAR SURGERY CLINIC ESTABLISHED PATIENT NOTE    HPI:    Cricket Chen is a 37 year old male who is status post right arm fistula ligation with Dr. Eaton on 11/7/2017. He returns to clinic today for follow up visit.     SUBJECTIVE:  Patient complains of right arm pain and swelling.    OBJECTIVE:  Vital signs:  ,./85 (BP Location: Left arm)  Pulse 73  Resp 18  SpO2 95%      Prior to Admission medications    Medication Sig Start Date End Date Taking? Authorizing Provider   lisinopril (PRINIVIL/ZESTRIL) 5 MG tablet TAKE ONE TABLET BY MOUTH EVERY DAY 9/5/17   Jake Sidhu MD   amLODIPine (NORVASC) 10 MG tablet TAKE ONE TABLET BY MOUTH EVERY DAY 9/5/17   Jake Sidhu MD   traMADol (ULTRAM) 50 MG tablet Take 1 tablet (50 mg) by mouth every 8 hours as needed for moderate pain 8/7/17   Paula Hughes MD   levothyroxine (SYNTHROID/LEVOTHROID) 150 MCG tablet Take 1 tablet (150 mcg) by mouth daily 8/7/17   Paula Hughes MD   magnesium oxide (MAG-OX) 400 (241.3 MG) MG tablet Take 1 tablet (400 mg) by mouth 2 times daily 7/21/17   Laureen Galvan MD   tacrolimus (PROGRAF - GENERIC EQUIVALENT) 1 MG capsule Take 2 capsules (2 mg) by mouth 2 times daily 6/21/17   Laureen Galvan MD   predniSONE (DELTASONE) 5 MG tablet Take 1 tablet (5 mg) by mouth daily 6/16/17   Jake Sidhu MD   sulfamethoxazole-trimethoprim (BACTRIM/SEPTRA) 400-80 MG per tablet Take 1 tablet by mouth daily 5/2/17   Laureen Galvan MD       PHYSICAL EXAM:  NEURO/PSYCH: The patient is alert and oriented.  Appropriate.  Moves all extremities.   SKIN:  Warm and  dry.  .PULMONARY: non-labored breathing    EXTREMITIES: palpable right radial pulse, thrombosed right arm AVF, moderate bulge, tender to touch, stitch removed. No arm redness         ASSESSMENT:  Patient Active Problem List   Diagnosis     Atrial flutter (H)     Complete transposition of great vessels     Esophageal varices (H)     Insomnia     Alcoholic hepatitis     History of alcohol abuse     History of transposition of great vessels     Depression     Thrombocytopenia (H)     Pain medication agreement     Congenital anomaly of heart     Liver replaced by transplant (H)     Kidney replaced by transplant     Immunosuppressed status (H)     Hypomagnesemia     Secondary renal hyperparathyroidism (H)     Chronic pain syndrome     Pain management contract signed     Aftercare following organ transplant     Post-transplant lymphoproliferative disorder (H)     Papillary thyroid carcinoma (H)     Abnormal liver function tests     Advance directive discussed with patient     Acute alcoholic liver disease     Alcohol dependence (H)     Encounter for palliative care       PLAN:  - Dr. Eaton explained that it could take up to 6 months for right arm fistula ligation site bulge to decrease in size    - could send patient to plastic surgeon if not happy with cosmetic result, he will call us if desired to consult with plastic surgeon.       Please do not hesitate to contact Dr. Eaton's vascular surgery team with any questions.      Emi STEARNS, CNS  Division of Vascular Surgery  [facility]  Pager [phone]          I have seen and assessed this patient with the above provider and agree with her above documentation, impression and plan.  Doing well.  Incision well healed and no inflammatory response from fistula thrombosis.  Patient would like less of a lump at his antecubital fossa.  THis should be reassessed in 6 months - it is a thrombosed vein and probably won't get much smaller.  At that time, if he  would like I can revise (excise) the vein mass or even refer him to plastics for the same.  Patient will call us in 6 months to ket us know how he feels about the cosmesis.    I spent>50% of this 25 min visit in counseling.    Sincerely,    Padmaja Eaton MD

## 2017-12-07 NOTE — PROGRESS NOTES
VASCULAR SURGERY CLINIC ESTABLISHED PATIENT NOTE    HPI:    Cricket Chen is a 37 year old male who is status post right arm fistula ligation with Dr. Eaton on 11/7/2017. He returns to clinic today for follow up visit.     SUBJECTIVE:  Patient complains of right arm pain and swelling.    OBJECTIVE:  Vital signs:  ,./85 (BP Location: Left arm)  Pulse 73  Resp 18  SpO2 95%      Prior to Admission medications    Medication Sig Start Date End Date Taking? Authorizing Provider   lisinopril (PRINIVIL/ZESTRIL) 5 MG tablet TAKE ONE TABLET BY MOUTH EVERY DAY 9/5/17   Jake Sidhu MD   amLODIPine (NORVASC) 10 MG tablet TAKE ONE TABLET BY MOUTH EVERY DAY 9/5/17   Jake Sidhu MD   traMADol (ULTRAM) 50 MG tablet Take 1 tablet (50 mg) by mouth every 8 hours as needed for moderate pain 8/7/17   Paula Hughes MD   levothyroxine (SYNTHROID/LEVOTHROID) 150 MCG tablet Take 1 tablet (150 mcg) by mouth daily 8/7/17   Paula Hughes MD   magnesium oxide (MAG-OX) 400 (241.3 MG) MG tablet Take 1 tablet (400 mg) by mouth 2 times daily 7/21/17   Laureen Galvan MD   tacrolimus (PROGRAF - GENERIC EQUIVALENT) 1 MG capsule Take 2 capsules (2 mg) by mouth 2 times daily 6/21/17   Laureen Galvan MD   predniSONE (DELTASONE) 5 MG tablet Take 1 tablet (5 mg) by mouth daily 6/16/17   Jake Sidhu MD   sulfamethoxazole-trimethoprim (BACTRIM/SEPTRA) 400-80 MG per tablet Take 1 tablet by mouth daily 5/2/17   Laureen Galvan MD       PHYSICAL EXAM:  NEURO/PSYCH: The patient is alert and oriented.  Appropriate.  Moves all extremities.   SKIN:  Warm and dry.  .PULMONARY: non-labored breathing    EXTREMITIES: palpable right radial pulse, thrombosed right arm AVF, moderate bulge, tender to touch, stitch removed. No arm redness         ASSESSMENT:  Patient Active Problem List   Diagnosis     Atrial flutter (H)     Complete transposition of great  vessels     Esophageal varices (H)     Insomnia     Alcoholic hepatitis     History of alcohol abuse     History of transposition of great vessels     Depression     Thrombocytopenia (H)     Pain medication agreement     Congenital anomaly of heart     Liver replaced by transplant (H)     Kidney replaced by transplant     Immunosuppressed status (H)     Hypomagnesemia     Secondary renal hyperparathyroidism (H)     Chronic pain syndrome     Pain management contract signed     Aftercare following organ transplant     Post-transplant lymphoproliferative disorder (H)     Papillary thyroid carcinoma (H)     Abnormal liver function tests     Advance directive discussed with patient     Acute alcoholic liver disease     Alcohol dependence (H)     Encounter for palliative care       PLAN:  - Dr. Eaton explained that it could take up to 6 months for right arm fistula ligation site bulge to decrease in size    - could send patient to plastic surgeon if not happy with cosmetic result, he will call us if desired to consult with plastic surgeon.       Please do not hesitate to contact Dr. Eaton's vascular surgery team with any questions.      Emi STEARNS, CNS  Division of Vascular Surgery  [facility]  Pager [phone]

## 2017-12-07 NOTE — MR AVS SNAPSHOT
After Visit Summary   12/7/2017    Cricket Chen    MRN: [id number]           Patient Information     Date Of Birth          1980        Visit Information        Provider Department      12/7/2017 3:00 PM Padmaja Eaton MD [facility] Vascular Clinic        Today's Diagnoses     Steal syndrome as complication of dialysis access, initial encounter (H)    -  1       Follow-ups after your visit        Your next 10 appointments already scheduled     Jan 16, 2018 12:10 PM CST   (Arrive by 11:55 AM)   Return Liver Transplant with Laureen Galvan MD   [facility] Hepatology ([facility])    [address] [place] [address]   [phone]            Jan 16, 2018  2:20 PM CST   (Arrive by 1:50 PM)   Return Kidney Transplant with Jake Sidhu MD   [facility] Nephrology ([facility])    [address] [place] [address]   [phone]            Jan 30, 2018 12:00 PM CST   (Arrive by 11:45 AM)   CT CHEST/ABDOMEN/PELVIS W CONTRAST with UCCT1   [facility] Imaging [place] [facility] ([facility])    [address] [place] [address]   [phone]           Please bring any scans or X-rays taken at other hospitals, if similar tests were done. Also bring a list of your medicines, including vitamins, minerals and over-the-counter drugs. It is safest to leave personal items at home.  Be sure to tell your doctor:   If you have any allergies.   If there s any chance you are pregnant.   If you are breastfeeding.   If you have any special needs.  You may have contrast for this exam. To prepare:   Do not eat or drink for 2 hours before your exam. If you need to take medicine, you may take it with small sips of water. (We may ask you to take liquid medicine as well.)   The day before your exam, drink extra fluids at least six 8-ounce glasses (unless your doctor tells you  to restrict your fluids).  Patients over 70 or patients with diabetes or kidney problems:   If you haven t had a blood test (creatinine test) within the last 30 days, go to your clinic or Diagnostic Imaging Department for this test.  If you have diabetes:   If your kidney function is normal, continue taking your metformin (Avandamet, Glucophage, Glucovance, Metaglip) on the day of your exam.   If your kidney function is abnormal, wait 48 hours before restarting this medicine.  You will have oral contrast for this exam:   You will drink the contrast at home. Get this from your clinic or Diagnostic Imaging Department. Please follow the directions given.  Please wear loose clothing, such as a sweat suit or jogging clothes. Avoid snaps, zippers and other metal. We may ask you to undress and put on a hospital gown.  If you have any questions, please call the Imaging Department where you will have your exam.            Feb 01, 2018  3:00 PM CST   Masonic Lab Draw with  MASONIC LAB DRAW   [facility] Masonic Lab Draw ([facility])    [address] [place] [id number] [phone]            Feb 01, 2018  3:30 PM CST   RETURN ONC with Girish Narayanan MD   [facility] Blood and Marrow Transplant ([facility])    [address] [place] [id number] [phone]            Feb 09, 2018  1:50 PM CST   (Arrive by 1:35 PM)   RETURN ENDOCRINE with Zuleyma Gray MD   [facility] Endocrinology ([facility])    [address] [place] [phone] [phone]              Who to contact     Please call your clinic at [phone] to:    Ask questions about your health    Make or cancel appointments    Discuss your medicines    Learn about your test results    Speak to your doctor   If you have compliments or concerns about an experience at your clinic, or if you wish to file a  complaint, please contact [facility] Patient Relations at [phone] or email us at [email or web address]         Additional Information About Your Visit        [employer] Information     [employer] gives you secure access to your electronic health record. If you see a primary care provider, you can also send messages to your care team and make appointments. If you have questions, please call your primary care clinic.  If you do not have a primary care provider, please call [phone] and they will assist you.      [employer] is an electronic gateway that provides easy, online access to your medical records. With [employer], you can request a clinic appointment, read your test results, renew a prescription or communicate with your care team.     To access your existing account, please contact your [facility] Clinic or call [phone] for assistance.        Care EveryWhere ID     This is your Care EveryWhere ID. This could be used by other organizations to access your [place] medical records  [id number]        Your Vitals Were     Pulse Respirations Pulse Oximetry             73 18 95%          Blood Pressure from Last 3 Encounters:   12/07/17 132/85   11/16/17 135/82   11/09/17 125/87    Weight from Last 3 Encounters:   11/07/17 213 lb 3 oz   10/26/17 212 lb 6.4 oz   08/25/17 206 lb 8 oz              Today, you had the following     No orders found for display       Primary Care Provider Fax #    Physician No Ref-Primary [phone]       No address on file        Equal Access to Services     [place] : Hadii steffany Barroso, waeliazar brito, qaybta kaalmada ramy, robina sibley. So [facility] [phone].    ATENCIÓN: Si habla español, tiene a laura disposición servicios gratuitos de asistencia lingüística. Llame al [phone].    We comply with applicable federal civil rights laws and [place] laws. We do not  discriminate on the basis of race, color, national origin, age, disability, sex, sexual orientation, or gender identity.            Thank you!     Thank you for choosing [facility] VASCULAR CLINIC  for your care. Our goal is always to provide you with excellent care. Hearing back from our patients is one way we can continue to improve our services. Please take a few minutes to complete the written survey that you may receive in the mail after your visit with us. Thank you!             Your Updated Medication List - Protect others around you: Learn how to safely use, store and throw away your medicines at www.disposemymeds.org.          This list is accurate as of: 12/7/17 10:24 PM.  Always use your most recent med list.                   Brand Name Dispense Instructions for use Diagnosis    amLODIPine 10 MG tablet    NORVASC    90 tablet    TAKE ONE TABLET BY MOUTH EVERY DAY    HTN (hypertension)       levothyroxine 150 MCG tablet    SYNTHROID/LEVOTHROID    90 tablet    Take 1 tablet (150 mcg) by mouth daily    Papillary thyroid carcinoma (H)       lisinopril 5 MG tablet    PRINIVIL/ZESTRIL    90 tablet    TAKE ONE TABLET BY MOUTH EVERY DAY    HTN (hypertension)       magnesium oxide 400 (241.3 MG) MG tablet    MAG-OX    120 tablet    Take 1 tablet (400 mg) by mouth 2 times daily    Hypomagnesemia       predniSONE 5 MG tablet    DELTASONE    90 tablet    Take 1 tablet (5 mg) by mouth daily    Liver replaced by transplant (H), S/P kidney transplant       sulfamethoxazole-trimethoprim 400-80 MG per tablet    BACTRIM/SEPTRA    30 tablet    Take 1 tablet by mouth daily    Liver replaced by transplant (H), S/P kidney transplant       tacrolimus 1 MG capsule    GENERIC EQUIVALENT    120 capsule    Take 2 capsules (2 mg) by mouth 2 times daily    Liver replaced by transplant (H), S/P kidney transplant       traMADol 50 MG tablet    ULTRAM    45 tablet    Take 1 tablet (50 mg) by mouth every 8 hours as needed for moderate  pain    Chronic pain of left knee, Papillary thyroid carcinoma (H)

## 2017-12-07 NOTE — NURSING NOTE
Chief Complaint   Patient presents with     RECHECK     Follow up right arm fistula        Vitals:    12/07/17 1456   BP: 132/85   BP Location: Left arm   Pulse: 73   Resp: 18   SpO2: 95%       There is no height or weight on file to calculate BMI.               Kaylan Corado LPN

## 2017-12-08 NOTE — PROGRESS NOTES
I have seen and assessed this patient with the above provider and agree with her above documentation, impression and plan.  Doing well.  Incision well healed and no inflammatory response from fistula thrombosis.  Patient would like less of a lump at his antecubital fossa.  THis should be reassessed in 6 months - it is a thrombosed vein and probably won't get much smaller.  At that time, if he would like I can revise (excise) the vein mass or even refer him to plastics for the same.  Patient will call us in 6 months to ket us know how he feels about the cosmesis.    I spent>50% of this 25 min visit in counseling.

## 2017-12-12 DIAGNOSIS — Z94.4 LIVER REPLACED BY TRANSPLANT (H): ICD-10-CM

## 2017-12-12 LAB
ALBUMIN SERPL-MCNC: 4.3 G/DL (ref 3.4–5)
ALP SERPL-CCNC: 86 U/L (ref 40–150)
ALT SERPL W P-5'-P-CCNC: 41 U/L (ref 0–70)
ANION GAP SERPL CALCULATED.3IONS-SCNC: 8 MMOL/L (ref 3–14)
AST SERPL W P-5'-P-CCNC: 20 U/L (ref 0–45)
BILIRUB DIRECT SERPL-MCNC: 0.2 MG/DL (ref 0–0.2)
BILIRUB SERPL-MCNC: 0.9 MG/DL (ref 0.2–1.3)
BUN SERPL-MCNC: 21 MG/DL (ref 7–30)
CALCIUM SERPL-MCNC: 9.2 MG/DL (ref 8.5–10.1)
CHLORIDE SERPL-SCNC: 104 MMOL/L (ref 94–109)
CO2 SERPL-SCNC: 28 MMOL/L (ref 20–32)
CREAT SERPL-MCNC: 1.51 MG/DL (ref 0.66–1.25)
ERYTHROCYTE [DISTWIDTH] IN BLOOD BY AUTOMATED COUNT: 13.8 % (ref 10–15)
GFR SERPL CREATININE-BSD FRML MDRD: 52 ML/MIN/1.7M2
GLUCOSE SERPL-MCNC: 95 MG/DL (ref 70–99)
HCT VFR BLD AUTO: 43.9 % (ref 40–53)
HGB BLD-MCNC: 15.3 G/DL (ref 13.3–17.7)
MAGNESIUM SERPL-MCNC: 1.9 MG/DL (ref 1.6–2.3)
MCH RBC QN AUTO: 30.6 PG (ref 26.5–33)
MCHC RBC AUTO-ENTMCNC: 34.9 G/DL (ref 31.5–36.5)
MCV RBC AUTO: 88 FL (ref 78–100)
PLATELET # BLD AUTO: 103 10E9/L (ref 150–450)
POTASSIUM SERPL-SCNC: 4.2 MMOL/L (ref 3.4–5.3)
PROT SERPL-MCNC: 7.5 G/DL (ref 6.8–8.8)
RBC # BLD AUTO: 5 10E12/L (ref 4.4–5.9)
SODIUM SERPL-SCNC: 140 MMOL/L (ref 133–144)
TACROLIMUS BLD-MCNC: 5.3 UG/L (ref 5–15)
TME LAST DOSE: NORMAL H
WBC # BLD AUTO: 4.1 10E9/L (ref 4–11)

## 2017-12-12 PROCEDURE — 83735 ASSAY OF MAGNESIUM: CPT | Performed by: INTERNAL MEDICINE

## 2017-12-12 PROCEDURE — 80076 HEPATIC FUNCTION PANEL: CPT | Performed by: INTERNAL MEDICINE

## 2017-12-12 PROCEDURE — 36415 COLL VENOUS BLD VENIPUNCTURE: CPT | Performed by: INTERNAL MEDICINE

## 2017-12-12 PROCEDURE — 85027 COMPLETE CBC AUTOMATED: CPT | Performed by: INTERNAL MEDICINE

## 2017-12-12 PROCEDURE — 80197 ASSAY OF TACROLIMUS: CPT | Performed by: INTERNAL MEDICINE

## 2017-12-12 PROCEDURE — 80048 BASIC METABOLIC PNL TOTAL CA: CPT | Performed by: INTERNAL MEDICINE

## 2017-12-13 ENCOUNTER — TELEPHONE (OUTPATIENT)
Dept: TRANSPLANT | Facility: CLINIC | Age: 37
End: 2017-12-13

## 2017-12-13 DIAGNOSIS — Z94.0 KIDNEY TRANSPLANTED: Primary | ICD-10-CM

## 2017-12-13 NOTE — TELEPHONE ENCOUNTER
Spoke with patient. He is feeling really well. Pt has not changed anything. Per Dr Sidhu patient needs to have a transplanted kidney biopsy.

## 2017-12-14 DIAGNOSIS — Z94.0 KIDNEY TRANSPLANTED: Primary | ICD-10-CM

## 2017-12-14 NOTE — TELEPHONE ENCOUNTER
LPN/MA  Renal Biopsy Communication    Call to pt to confirm renal biopsy procedure. Biopsy orders entered and patient aware of date 12/18/2017, in [facility] and to arrive at 6:00AM.     No need to be NPO.      Discussed anticoagulants (i.e. fish oil, ASA, Plavix, Coumadin, Ibuprofen). Pt denies use of anticoagulants.     Take all medicine before arrival for biopsy and bring all medicine bottles with.      Report to 1st floor lab, then 5th floor for biopsy.      Use [employer] services and bring form of entertainment.     Discussed with patient need to stay overnight locally evening of procedure if live more than 70 miles away.    Call placed to scheduling at [phone] to schedule and confirm biopsy date/time    Lab appointment scheduled for morning of biopsy.

## 2017-12-18 ENCOUNTER — SURGERY (OUTPATIENT)
Age: 37
End: 2017-12-18

## 2017-12-18 ENCOUNTER — RESULTS ONLY (OUTPATIENT)
Dept: OTHER | Facility: CLINIC | Age: 37
End: 2017-12-18

## 2017-12-18 ENCOUNTER — HOSPITAL ENCOUNTER (OUTPATIENT)
Facility: AMBULATORY SURGERY CENTER | Age: 37
End: 2017-12-18
Attending: INTERNAL MEDICINE
Payer: COMMERCIAL

## 2017-12-18 VITALS
WEIGHT: 213 LBS | SYSTOLIC BLOOD PRESSURE: 122 MMHG | HEART RATE: 83 BPM | TEMPERATURE: 97.3 F | HEIGHT: 70 IN | OXYGEN SATURATION: 96 % | BODY MASS INDEX: 30.49 KG/M2 | RESPIRATION RATE: 16 BRPM | DIASTOLIC BLOOD PRESSURE: 77 MMHG

## 2017-12-18 DIAGNOSIS — Z94.0 KIDNEY REPLACED BY TRANSPLANT: Primary | ICD-10-CM

## 2017-12-18 DIAGNOSIS — Z94.0 KIDNEY TRANSPLANTED: ICD-10-CM

## 2017-12-18 LAB
ABO + RH BLD: NORMAL
ABO + RH BLD: NORMAL
ALBUMIN UR-MCNC: NEGATIVE MG/DL
ANION GAP SERPL CALCULATED.3IONS-SCNC: 8 MMOL/L (ref 3–14)
APPEARANCE UR: CLEAR
BASOPHILS # BLD AUTO: 0.1 10E9/L (ref 0–0.2)
BASOPHILS NFR BLD AUTO: 2.6 %
BILIRUB UR QL STRIP: NEGATIVE
BLD GP AB SCN SERPL QL: NORMAL
BLOOD BANK CMNT PATIENT-IMP: NORMAL
BUN SERPL-MCNC: 20 MG/DL (ref 7–30)
CALCIUM SERPL-MCNC: 8.7 MG/DL (ref 8.5–10.1)
CHLORIDE SERPL-SCNC: 104 MMOL/L (ref 94–109)
CO2 SERPL-SCNC: 26 MMOL/L (ref 20–32)
COLOR UR AUTO: NORMAL
CREAT SERPL-MCNC: 1.21 MG/DL (ref 0.66–1.25)
CREAT UR-MCNC: 89 MG/DL
DIFFERENTIAL METHOD BLD: ABNORMAL
EOSINOPHIL # BLD AUTO: 0 10E9/L (ref 0–0.7)
EOSINOPHIL NFR BLD AUTO: 1.8 %
ERYTHROCYTE [DISTWIDTH] IN BLOOD BY AUTOMATED COUNT: 13.2 % (ref 10–15)
GFR SERPL CREATININE-BSD FRML MDRD: 67 ML/MIN/1.7M2
GLUCOSE SERPL-MCNC: 90 MG/DL (ref 70–99)
GLUCOSE UR STRIP-MCNC: NEGATIVE MG/DL
HCT VFR BLD AUTO: 41.5 % (ref 40–53)
HGB BLD-MCNC: 13.9 G/DL (ref 13.3–17.7)
HGB UR QL STRIP: NEGATIVE
INR PPP: 1.11 (ref 0.86–1.14)
KETONES UR STRIP-MCNC: NEGATIVE MG/DL
LEUKOCYTE ESTERASE UR QL STRIP: NEGATIVE
LYMPHOCYTES # BLD AUTO: 0.3 10E9/L (ref 0.8–5.3)
LYMPHOCYTES NFR BLD AUTO: 14.9 %
MCH RBC QN AUTO: 29.8 PG (ref 26.5–33)
MCHC RBC AUTO-ENTMCNC: 33.5 G/DL (ref 31.5–36.5)
MCV RBC AUTO: 89 FL (ref 78–100)
MICROALBUMIN UR-MCNC: 6 MG/L
MICROALBUMIN/CREAT UR: 6.64 MG/G CR (ref 0–17)
MONOCYTES # BLD AUTO: 0.2 10E9/L (ref 0–1.3)
MONOCYTES NFR BLD AUTO: 8.8 %
NEUTROPHILS # BLD AUTO: 1.5 10E9/L (ref 1.6–8.3)
NEUTROPHILS NFR BLD AUTO: 71.9 %
NITRATE UR QL: NEGATIVE
PH UR STRIP: 6 PH (ref 5–7)
PLATELET # BLD AUTO: 104 10E9/L (ref 150–450)
PLATELET # BLD EST: ABNORMAL 10*3/UL
POTASSIUM SERPL-SCNC: 3.9 MMOL/L (ref 3.4–5.3)
PROT UR-MCNC: 0.09 G/L
PROT/CREAT 24H UR: 0.1 G/G CR (ref 0–0.2)
RBC # BLD AUTO: 4.66 10E12/L (ref 4.4–5.9)
RBC #/AREA URNS AUTO: 0 /HPF (ref 0–2)
RBC MORPH BLD: NORMAL
SODIUM SERPL-SCNC: 139 MMOL/L (ref 133–144)
SOURCE: NORMAL
SP GR UR STRIP: 1.01 (ref 1–1.03)
SPECIMEN EXP DATE BLD: NORMAL
UROBILINOGEN UR STRIP-MCNC: 0 MG/DL (ref 0–2)
WBC # BLD AUTO: 2.1 10E9/L (ref 4–11)
WBC #/AREA URNS AUTO: 1 /HPF (ref 0–2)

## 2017-12-18 PROCEDURE — 96523 IRRIG DRUG DELIVERY DEVICE: CPT

## 2017-12-18 PROCEDURE — 25000128 H RX IP 250 OP 636: Performed by: INTERNAL MEDICINE

## 2017-12-18 RX ORDER — MYCOPHENOLATE MOFETIL 250 MG/1
250 CAPSULE ORAL 2 TIMES DAILY
Qty: 60 CAPSULE | Refills: 11 | Status: SHIPPED | OUTPATIENT
Start: 2017-12-18 | End: 2019-11-01

## 2017-12-18 RX ORDER — HEPARIN SODIUM (PORCINE) LOCK FLUSH IV SOLN 100 UNIT/ML 100 UNIT/ML
5 SOLUTION INTRAVENOUS EVERY 8 HOURS
Status: DISCONTINUED | OUTPATIENT
Start: 2017-12-18 | End: 2017-12-26 | Stop reason: HOSPADM

## 2017-12-18 RX ORDER — HEPARIN SODIUM (PORCINE) LOCK FLUSH IV SOLN 100 UNIT/ML 100 UNIT/ML
5 SOLUTION INTRAVENOUS ONCE
Status: COMPLETED | OUTPATIENT
Start: 2017-12-18 | End: 2017-12-18

## 2017-12-18 RX ADMIN — HEPARIN SODIUM (PORCINE) LOCK FLUSH IV SOLN 100 UNIT/ML 5 ML: 100 SOLUTION at 08:17

## 2017-12-18 RX ADMIN — SODIUM CHLORIDE, PRESERVATIVE FREE 5 ML: 5 INJECTION INTRAVENOUS at 06:56

## 2017-12-19 LAB
DONOR IDENTIFICATION: NORMAL
DSA COMMENTS: NORMAL
DSA PRESENT: NO
DSA TEST METHOD: NORMAL
ORGAN: NORMAL
SA1 CELL: NORMAL
SA1 COMMENTS: NORMAL
SA1 HI RISK ABY: NORMAL
SA1 MOD RISK ABY: NORMAL
SA1 TEST METHOD: NORMAL
SA2 CELL: NORMAL
SA2 COMMENTS: NORMAL
SA2 HI RISK ABY UA: NORMAL
SA2 MOD RISK ABY: NORMAL
SA2 TEST METHOD: NORMAL

## 2017-12-20 LAB — PRA DONOR SPECIFIC ABY: NORMAL

## 2017-12-22 LAB
BKV DNA # SPEC NAA+PROBE: ABNORMAL COPIES/ML
BKV DNA SPEC NAA+PROBE-LOG#: 5 LOG COPIES/ML
SPECIMEN SOURCE: ABNORMAL

## 2017-12-22 NOTE — H&P
Nephrology Procedure H&P  12/22/2017     Assessment & Recommendations:   1. DDKT - baseline creatinine ~ 1.0-1.2, which has trended up. Normal proteinuria. Will plan on kidney transplant biopsy to evaluate for etiology of elevated serum creatinine.  However, creatinine is now back to baseline with today's labs. Will add mycophenolate mofetil 250 mg bid and continue on tacrolimus and prednisone..    Transplant History:  Transplant: 5/11/2016 (Kidney), 5/10/2016 (Liver)        Donor Class:   Crossmatch at time of Transplant:    DSA at time of Transplant:  Yes  Present Maintenance Immunosuppression:  Tacrolimus and Prednisone  Baseline creatinine:  1.0-1.2  Latest DSA lab date:  PRA:  Class I:   SA1 Comments   Date Value Ref Range Status   12/18/2017   Final    Test performed by modified procedure. Serum heat inactivated and tested by   a modified ([place]) protocol including fetal calf serum addition.   High-risk, mfi >3,000. Mod-risk, mfi 500-3,000.          Class II:    SA2 Comments   Date Value Ref Range Status   12/18/2017   Final    Test performed by modified procedure. Serum heat inactivated and tested by   a modified ([place]) protocol including fetal calf serum addition.   High-risk, mfi >3,000. Mod-risk, mfi 500-3,000.        Biopsy:  No  Rejection History:  No  Significant Complications: PTLD  Transplant Coordinator: Elvia Boggs   Transplant Office Phone Number:  [phone]     2. Hypertension - well controlled at target of less than 140/90. No changes.  3. PTLD, s/p chemo - no evidence of recurrence.  If next CT scans continue to show resolution, would consider increasing mycophenolate mofetil further and tapering off prednisone.  4. Thyroid cancer, s/p thyroidectomy 8/2017 - appears to be doing well.    Reason for Visit:  Mr. Chen is here for elevated creatinine and potential kidney transplant biopsy.    History of Present Illness:  Cricket Chen is a 37 year old male with ESKD from [facility] and  is status post DDKT and liver transplant on 5/10/16.    Mr. Chen reports feeling good overall with minimal medical complaints.  Patient was previously diagnosed with PTLD earlier this year and received rituximab x 4, then R-CEOP x 4 cycles.  During this time his immunosuppression was reduced to prednisone only.  Since that time, he has been restarted on tacrolimus, along with prednisone.  More recently, his serum creatinine has trended up and patient presents for possible kidney transplant biopsy.    His energy level has been good and remains normal.  His active and does get some exercise.  Denies any chest pain or shortness of breath with exertion.  Appetite is good with no nausea, vomiting or diarrhea.  No fever, sweats or chills.  Occasional leg swelling at the end of the day, but not much of an issue.    Pain Over Kidney Tx:  No Pain or Burning with Urination:  No  Gross Hematuria:  No  Taking NSAIDs:  No    Home BP: Not checked    Review of Systems:  A comprehensive review of systems was obtained and negative, except as noted in the History of Present Illness or Active Medical Problems.    Active Medical Problems:  Patient Active Problem List    Diagnosis     Post-transplant lymphoproliferative disorder (H)     Abnormal liver function tests     Papillary thyroid carcinoma (H)     Aftercare following organ transplant     Chronic pain syndrome     Pain management contract signed     Hypomagnesemia     Secondary renal hyperparathyroidism (H)     Immunosuppressed status (H)     Liver replaced by transplant (H)     Kidney replaced by transplant     Congenital anomaly of heart     Pain medication agreement     Thrombocytopenia (H)     History of transposition of great vessels     Depression     History of alcohol abuse     Alcoholic hepatitis     Insomnia     Esophageal varices (H)     Atrial flutter (H)     Encounter for palliative care     Acute alcoholic liver disease     Complete transposition of great  "vessels     Advance directive discussed with patient     Alcohol dependence (H)     Current Medications:  Current Outpatient Prescriptions   Medication Sig Dispense Refill     mycophenolate (GENERIC EQUIVALENT) 250 MG capsule Take 1 capsule (250 mg) by mouth 2 times daily 60 capsule 11     lisinopril (PRINIVIL/ZESTRIL) 5 MG tablet TAKE ONE TABLET BY MOUTH EVERY DAY 90 tablet 3     amLODIPine (NORVASC) 10 MG tablet TAKE ONE TABLET BY MOUTH EVERY DAY 90 tablet 3     traMADol (ULTRAM) 50 MG tablet Take 1 tablet (50 mg) by mouth every 8 hours as needed for moderate pain 45 tablet 0     levothyroxine (SYNTHROID/LEVOTHROID) 150 MCG tablet Take 1 tablet (150 mcg) by mouth daily 90 tablet 4     magnesium oxide (MAG-OX) 400 (241.3 MG) MG tablet Take 1 tablet (400 mg) by mouth 2 times daily 120 tablet 11     tacrolimus (PROGRAF - GENERIC EQUIVALENT) 1 MG capsule Take 2 capsules (2 mg) by mouth 2 times daily 120 capsule 11     predniSONE (DELTASONE) 5 MG tablet Take 1 tablet (5 mg) by mouth daily 90 tablet 3     sulfamethoxazole-trimethoprim (BACTRIM/SEPTRA) 400-80 MG per tablet Take 1 tablet by mouth daily 30 tablet 11     Vitals:  /77 (Cuff Size: Adult Regular)  Pulse 83  Temp 97.3  F (36.3  C) (Temporal)  Resp 16  Ht 1.778 m (5' 10\")  Wt 96.6 kg (213 lb)  SpO2 96%  BMI 30.56 kg/m2    Physical Exam:   GENERAL APPEARANCE: alert and no distress  HENT: mouth without ulcers or lesions  PULM: lungs clear to auscultation, equal air movement  CV: regular rhythm, normal rate     - none to trace LE edema bilaterally  GI: soft, nontender, bowel sounds are normal  MS: no evidence of inflammation in joints, no muscle tenderness  TX KIDNEY: nontender    Labs:   All labs reviewed by me    Recent Results (from the past 168 hour(s))   HLA Donor Specific Antibody    Collection Time: 12/18/17  6:36 AM   Result Value Ref Range    Donor Identification 05/10/2016     Organ Kidney/Liver     DSA Present NO     DSA Comments        Flow " Single Antigen Beads assays are intended for   detection/identification of IgG anti-HLA antibodies. Mfi values may not   accurately quantify donor-specific antibody levels in all instances.      DSA Test Method  FCS    HLA Kori Class I Single Antigen    Collection Time: 12/18/17  6:36 AM   Result Value Ref Range    SA1 Test Method  FCS     SA1 Cell Class I     SA1 Hi Risk Kori None     SA1 Mod Risk Kori A:25 66 Cw:6     SA1 Comments       Test performed by modified procedure. Serum heat inactivated and tested by   a modified ([place]) protocol including fetal calf serum addition.   High-risk, mfi >3,000. Mod-risk, mfi 500-3,000.      HLA Kori Class II Single Antigen    Collection Time: 12/18/17  6:36 AM   Result Value Ref Range    SA2 Test Method  FCS     SA2 Cell Class II     SA2 Hi Risk Kori None     SA2 Mod Risk Kori None     SA2 Comments       Test performed by modified procedure. Serum heat inactivated and tested by   a modified ([place]) protocol including fetal calf serum addition.   High-risk, mfi >3,000. Mod-risk, mfi 500-3,000.      Basic metabolic panel    Collection Time: 12/18/17  6:59 AM   Result Value Ref Range    Sodium 139 133 - 144 mmol/L    Potassium 3.9 3.4 - 5.3 mmol/L    Chloride 104 94 - 109 mmol/L    Carbon Dioxide 26 20 - 32 mmol/L    Anion Gap 8 3 - 14 mmol/L    Glucose 90 70 - 99 mg/dL    Urea Nitrogen 20 7 - 30 mg/dL    Creatinine 1.21 0.66 - 1.25 mg/dL    GFR Estimate 67 >60 mL/min/1.7m2    GFR Estimate If Black 82 >60 mL/min/1.7m2    Calcium 8.7 8.5 - 10.1 mg/dL   CBC with platelets differential    Collection Time: 12/18/17  6:59 AM   Result Value Ref Range    WBC 2.1 (L) 4.0 - 11.0 10e9/L    RBC Count 4.66 4.4 - 5.9 10e12/L    Hemoglobin 13.9 13.3 - 17.7 g/dL    Hematocrit 41.5 40.0 - 53.0 %    MCV 89 78 - 100 fl    MCH 29.8 26.5 - 33.0 pg    MCHC 33.5 31.5 - 36.5 g/dL    RDW 13.2 10.0 - 15.0 %    Platelet Count 104 (L) 150 - 450 10e9/L    Diff Method Manual Differential     % Neutrophils  71.9 %    % Lymphocytes 14.9 %    % Monocytes 8.8 %    % Eosinophils 1.8 %    % Basophils 2.6 %    Absolute Neutrophil 1.5 (L) 1.6 - 8.3 10e9/L    Absolute Lymphocytes 0.3 (L) 0.8 - 5.3 10e9/L    Absolute Monocytes 0.2 0.0 - 1.3 10e9/L    Absolute Eosinophils 0.0 0.0 - 0.7 10e9/L    Absolute Basophils 0.1 0.0 - 0.2 10e9/L    RBC Morphology Normal     Platelet Estimate Confirming automated cell count    INR    Collection Time: 12/18/17  6:59 AM   Result Value Ref Range    INR 1.11 0.86 - 1.14   PRA Donor Specific Antibody    Collection Time: 12/18/17  6:59 AM   Result Value Ref Range    PRA Donor Specific Kori       Specimen received - Immunology report to follow upon completion.   ABO/Rh type and screen    Collection Time: 12/18/17  6:59 AM   Result Value Ref Range    ABO A     RH(D) Pos     Antibody Screen Neg     Test Valid Only At          [facility]    Specimen Expires 12/21/2017    Routine UA with microscopic    Collection Time: 12/18/17  7:00 AM   Result Value Ref Range    Color Urine Straw     Appearance Urine Clear     Glucose Urine Negative NEG^Negative mg/dL    Bilirubin Urine Negative NEG^Negative    Ketones Urine Negative NEG^Negative mg/dL    Specific Gravity Urine 1.011 1.003 - 1.035    Blood Urine Negative NEG^Negative    pH Urine 6.0 5.0 - 7.0 pH    Protein Albumin Urine Negative NEG^Negative mg/dL    Urobilinogen mg/dL 0.0 0.0 - 2.0 mg/dL    Nitrite Urine Negative NEG^Negative    Leukocyte Esterase Urine Negative NEG^Negative    Source Midstream Urine     WBC Urine 1 0 - 2 /HPF    RBC Urine 0 0 - 2 /HPF   Albumin Random Urine Quantitative with Creat Ratio    Collection Time: 12/18/17  7:00 AM   Result Value Ref Range    Creatinine Urine 89 mg/dL    Albumin Urine mg/L 6 mg/L    Albumin Urine mg/g Cr 6.64 0 - 17 mg/g Cr   Protein  random urine with Creat Ratio    Collection Time: 12/18/17  7:00 AM   Result Value Ref Range    Protein Random Urine 0.09 g/L     Protein Total Urine g/gr Creatinine 0.10 0 - 0.2 g/g Cr        Jake Sidhu MD

## 2017-12-29 ENCOUNTER — TELEPHONE (OUTPATIENT)
Dept: TRANSPLANT | Facility: CLINIC | Age: 37
End: 2017-12-29

## 2017-12-29 DIAGNOSIS — Z94.0 KIDNEY TRANSPLANTED: Primary | ICD-10-CM

## 2017-12-29 NOTE — TELEPHONE ENCOUNTER
ISSUE:  Asked to assist in the care for BK viremia. Found in result of standard biopsy labs. Discussed with Dr. Khan: Follow BK algorithm, obtain kidney transplant labs weekly.     Ref. Range 12/18/2017 07:00   BK Virus Result Latest Ref Range: BKNEG^BK Virus DNA Not Detected copies/mL 780890 (A)   BK Virus Log Latest Ref Range: <2.7 Log copies/mL 5.0 (H)   Hx: Pt with PTLD, IS decreased to  mg BID and FK goal 3-5. Creatinine stable. Will begin with checking IgG and place on acute list for routine review.     PLAN:   Called to pt, discussed the above - in agreement and no additional questions.     Please place kidney transplant lab orders with BK weekly.

## 2018-01-02 DIAGNOSIS — Z94.0 KIDNEY TRANSPLANTED: ICD-10-CM

## 2018-01-02 DIAGNOSIS — Z94.0 S/P KIDNEY TRANSPLANT: ICD-10-CM

## 2018-01-02 DIAGNOSIS — Z94.4 LIVER REPLACED BY TRANSPLANT (H): ICD-10-CM

## 2018-01-02 PROCEDURE — 36415 COLL VENOUS BLD VENIPUNCTURE: CPT | Performed by: INTERNAL MEDICINE

## 2018-01-02 PROCEDURE — 82784 ASSAY IGA/IGD/IGG/IGM EACH: CPT | Performed by: INTERNAL MEDICINE

## 2018-01-03 LAB — IGG SERPL-MCNC: 743 MG/DL (ref 695–1620)

## 2018-01-04 DIAGNOSIS — Z94.0 KIDNEY TRANSPLANTED: Primary | ICD-10-CM

## 2018-01-09 DIAGNOSIS — Z94.0 KIDNEY TRANSPLANTED: ICD-10-CM

## 2018-01-09 LAB
ANION GAP SERPL CALCULATED.3IONS-SCNC: 9 MMOL/L (ref 3–14)
BUN SERPL-MCNC: 21 MG/DL (ref 7–30)
CALCIUM SERPL-MCNC: 9.3 MG/DL (ref 8.5–10.1)
CHLORIDE SERPL-SCNC: 104 MMOL/L (ref 94–109)
CO2 SERPL-SCNC: 24 MMOL/L (ref 20–32)
CREAT SERPL-MCNC: 1.23 MG/DL (ref 0.66–1.25)
ERYTHROCYTE [DISTWIDTH] IN BLOOD BY AUTOMATED COUNT: 14 % (ref 10–15)
GFR SERPL CREATININE-BSD FRML MDRD: 66 ML/MIN/1.7M2
GLUCOSE SERPL-MCNC: 97 MG/DL (ref 70–99)
HCT VFR BLD AUTO: 44.2 % (ref 40–53)
HGB BLD-MCNC: 15.1 G/DL (ref 13.3–17.7)
MCH RBC QN AUTO: 29.9 PG (ref 26.5–33)
MCHC RBC AUTO-ENTMCNC: 34.2 G/DL (ref 31.5–36.5)
MCV RBC AUTO: 88 FL (ref 78–100)
PLATELET # BLD AUTO: 128 10E9/L (ref 150–450)
POTASSIUM SERPL-SCNC: 4 MMOL/L (ref 3.4–5.3)
RBC # BLD AUTO: 5.05 10E12/L (ref 4.4–5.9)
SODIUM SERPL-SCNC: 137 MMOL/L (ref 133–144)
WBC # BLD AUTO: 7 10E9/L (ref 4–11)

## 2018-01-09 PROCEDURE — 85027 COMPLETE CBC AUTOMATED: CPT | Performed by: INTERNAL MEDICINE

## 2018-01-09 PROCEDURE — 80048 BASIC METABOLIC PNL TOTAL CA: CPT | Performed by: INTERNAL MEDICINE

## 2018-01-09 PROCEDURE — 36415 COLL VENOUS BLD VENIPUNCTURE: CPT | Performed by: INTERNAL MEDICINE

## 2018-01-16 ENCOUNTER — OFFICE VISIT (OUTPATIENT)
Dept: GASTROENTEROLOGY | Facility: CLINIC | Age: 38
End: 2018-01-16
Attending: INTERNAL MEDICINE
Payer: COMMERCIAL

## 2018-01-16 ENCOUNTER — OFFICE VISIT (OUTPATIENT)
Dept: NEPHROLOGY | Facility: CLINIC | Age: 38
End: 2018-01-16
Attending: INTERNAL MEDICINE
Payer: COMMERCIAL

## 2018-01-16 VITALS
HEART RATE: 71 BPM | WEIGHT: 212 LBS | BODY MASS INDEX: 30.35 KG/M2 | SYSTOLIC BLOOD PRESSURE: 132 MMHG | TEMPERATURE: 97.8 F | HEIGHT: 70 IN | OXYGEN SATURATION: 98 % | DIASTOLIC BLOOD PRESSURE: 88 MMHG

## 2018-01-16 VITALS
DIASTOLIC BLOOD PRESSURE: 88 MMHG | HEART RATE: 71 BPM | OXYGEN SATURATION: 98 % | HEIGHT: 70 IN | SYSTOLIC BLOOD PRESSURE: 132 MMHG | TEMPERATURE: 97.8 F | WEIGHT: 212 LBS | BODY MASS INDEX: 30.35 KG/M2

## 2018-01-16 DIAGNOSIS — D84.9 IMMUNOSUPPRESSED STATUS (H): ICD-10-CM

## 2018-01-16 DIAGNOSIS — Z94.0 KIDNEY REPLACED BY TRANSPLANT: ICD-10-CM

## 2018-01-16 DIAGNOSIS — Z94.4 LIVER REPLACED BY TRANSPLANT (H): Primary | ICD-10-CM

## 2018-01-16 DIAGNOSIS — Z48.298 AFTERCARE FOLLOWING ORGAN TRANSPLANT: ICD-10-CM

## 2018-01-16 DIAGNOSIS — B34.8 BK VIREMIA: ICD-10-CM

## 2018-01-16 DIAGNOSIS — I15.1 HYPERTENSION SECONDARY TO OTHER RENAL DISORDERS: ICD-10-CM

## 2018-01-16 DIAGNOSIS — Z94.4 LIVER REPLACED BY TRANSPLANT (H): ICD-10-CM

## 2018-01-16 DIAGNOSIS — N25.81 SECONDARY RENAL HYPERPARATHYROIDISM (H): ICD-10-CM

## 2018-01-16 DIAGNOSIS — B27.00 EBV (EPSTEIN-BARR VIRUS) VIREMIA: ICD-10-CM

## 2018-01-16 DIAGNOSIS — E83.42 HYPOMAGNESEMIA: Primary | ICD-10-CM

## 2018-01-16 PROBLEM — R79.89 ABNORMAL LIVER FUNCTION TESTS: Status: RESOLVED | Noted: 2017-06-30 | Resolved: 2018-01-16

## 2018-01-16 PROCEDURE — G0463 HOSPITAL OUTPT CLINIC VISIT: HCPCS | Mod: ZF

## 2018-01-16 ASSESSMENT — PAIN SCALES - GENERAL
PAINLEVEL: NO PAIN (0)
PAINLEVEL: NO PAIN (0)

## 2018-01-16 NOTE — LETTER
1/16/2018      RE: Cricket Chen  [address]  [place] [address]       Assessment and Plan:  1. DDKT - baseline Cr ~ 1.0-1.2, which had been running a bit higher, but now back to baseline over the last month.  Normal proteinuria.  Patient did have DSA at time of transplant, but no DSA with last check.  If creatinine increases, especially in light of new BK viremia, would have low threshold for a kidney transplant biopsy.  Will make no changes in immunosuppression at this time.  Target tacrolimus 3-5.  2. HTN - well controlled at target of less than 140/90.  No changes.  3. 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  IgG level is normal.  Will recheck BK PCR and if stable or increased, would recommend stopping mycophenolate mofetil again and give 2 doses of IVIg.  If level is significantly decreased, would just follow.  4. Secondary renal hyperparathyroidism - previously mildly elevated PTH with last check.  Will check PTH and vitamin D level at next clinic visit.  5. Hypomagnesemia - normal serum magnesium level with last check and will continue on oral magnesium supplement.  Will recheck serum magnesium level with next labs.  6. Thrombocytopenia - stable platelet level, generally 80-120s.  7. Liver transplant - appears stable with normal LFTs.  8. PTLD, s/p rituximab and chemotherapy - no evidence of recurrence.  Patient to be reimaged in a couple of weeks and follows up with Oncology.  9. EBV viremia - negative EBV viremia with last check.  Will recheck EBV PCR with next labs.  10. Papillary thyroid cancer, s/p thyroidectomy - doing well on levothyroxine.  11. Skin cancer risk - no new skin lesions.  Recommend regular follow up with Dermatology.  12. Recommend return visit in 6 months.    Assessment and plan was discussed with patient and he voiced his understanding and agreement.    Reason for Visit:  Mr. Chen is here for routine follow up.    HPI:   Cricket Chen is a 37 year old male with ESKD  from [facility] and ESLD secondary alcoholic cirrhosis and is status post DDKT and liver transplant on 5/11/16.         Transplant Hx:       Tx: DDKT  Date: 5/11/16       Tx: Liver Tx Date: 5/11/16       Present Maintenance IS: Tacrolimus, Mycophenolate mofetil and Prednisone       Baseline Creatinine: 1.0-1.2       Recent DSA: No  Date last checked: 12/2017       Biopsy: No    Mr. Chen reports feeling good overall with minimal medical complaints.  He recently had a trend up in his serum creatinine and because he is on lower immunosuppression due to recent PTLD, he was scheduled for a kidney transplant biopsy to rule out acute rejection.  At the time of the biopsy, his creatinine was back at baseline, so the biopsy was canceled.  Because of patient's lower immunosuppression and previous positive DSA, he was restarted on low dose mycophenolate mofetil 250 mg bid, along with his tacrolimus and prednisone.  However, labs from that day came back later showing new BK viremia of ~ 110K.  Repeat BK will be done this next week.  Also, his DSA on the day of the proposed biopsy came back negative.    His energy level is good and remains normal.  He is active and has been getting some exercise with playing hockey with friends.  Denies any chest pain or shortness of breath with exertion.  Appetite is good and weight is stable.  No nausea, vomiting or diarrhea.  No fever, sweats or chills.  No leg swelling.    Home BP: 130/70-80s.      ROS:   A comprehensive review of systems was obtained and negative, except as noted in the HPI or PMH.    Active Medical Problems:  Patient Active Problem List   Diagnosis     Atrial flutter (H)     Complete transposition of great vessels     Esophageal varices (H)     Insomnia     Alcoholic hepatitis     History of alcohol abuse     History of transposition of great vessels     Depression     Thrombocytopenia (H)     Pain medication agreement     Congenital anomaly of heart     Liver replaced by  transplant (H)     Kidney replaced by transplant     Immunosuppressed status (H)     Hypomagnesemia     Secondary renal hyperparathyroidism (H)     Chronic pain syndrome     Pain management contract signed     Aftercare following organ transplant     Post-transplant lymphoproliferative disorder (H)     Papillary thyroid carcinoma (H)     Advance directive discussed with patient     Acute alcoholic liver disease     Alcohol dependence (H)     Encounter for palliative care     Hypertension secondary to other renal disorders       Personal Hx:  Social History     Social History     Marital status: Single     Spouse name: N/A     Number of children: 0     Years of education: N/A     Occupational History      Unemployed     Social History Main Topics     Smoking status: Former Smoker     Packs/day: 0.33     Years: 3.00     Types: Cigarettes     Start date: 8/20/1997     Quit date: 9/21/2000     Smokeless tobacco: Former User     Alcohol use No      Comment: ~10 drinks per day for ten years, quit in April of 2014     Drug use: No     Sexual activity: Not Currently     Partners: Female     Birth control/ protection: None     Other Topics Concern     Not on file     Social History Narrative    ? Blood transfusion as baby during surgery,    Professional performed tattoos     No Illicit IV or intranasal drug use.        Allergies:  Allergies   Allergen Reactions     Hydromorphone Itching       Medications:  Prior to Admission medications    Medication Sig Start Date End Date Taking? Authorizing Provider   traMADol (ULTRAM) 50 MG tablet Take 1 tablet (50 mg) by mouth every 8 hours as needed for moderate pain 12/14/16  Yes Antoinette Nelson MD   magnesium oxide (MAG-OX) 400 (241.3 MG) MG tablet Take 2 tablets (800 mg) by mouth 2 times daily 12/8/16  Yes Jake Sidhu MD   PROGRAF 1 MG PO CAPSULE 4 mg AM and 3 mg PM 10/26/16  Yes Laureen Galvan MD   amLODIPine (NORVASC) 10 MG tablet Take 1  "tablet (10 mg) by mouth daily 10/11/16  Yes Jake Sidhu MD   lisinopril (PRINIVIL,ZESTRIL) 5 MG tablet Take 1 tablet (5 mg) by mouth daily 10/11/16  Yes Jake Sidhu MD   mycophenolate (CELLCEPT - GENERIC EQUIVALENT) 250 MG capsule Take 3 capsules (750 mg) by mouth 2 times daily 8/5/16  Yes Vinod Bermeo MD   sulfamethoxazole-trimethoprim (BACTRIM,SEPTRA) 400-80 MG per tablet Take 1 tablet by mouth daily 5/17/16  Yes Lorenza Alonso PA-C   pantoprazole (PROTONIX) 20 MG tablet Take 1 tablet (20 mg) by mouth daily 3/16/16  Yes Laureen Galvan MD       Vitals:  /88  Pulse 71  Temp 97.8  F (36.6  C) (Oral)  Ht 1.778 m (5' 10\")  Wt 96.2 kg (212 lb)  SpO2 98%  BMI 30.42 kg/m2    Exam:   GENERAL APPEARANCE: alert and no distress  HENT: mouth without ulcers or lesions  LYMPHATICS: no cervical or supraclavicular nodes  RESP: lungs clear to auscultation - no rales, rhonchi or wheezes  CV: regular rhythm, normal rate, no rub, no murmur  EDEMA: no LE edema bilaterally  ABDOMEN: soft, nondistended, nontender, bowel sounds normal  MS: extremities normal - no gross deformities noted, no evidence of inflammation in joints, no muscle tenderness  SKIN: no rash  TX KIDNEY: normal    Results:   Recent Results (from the past 504 hour(s))   IgG    Collection Time: 01/02/18 12:25 PM   Result Value Ref Range     695 - 1620 mg/dL   Basic metabolic panel    Collection Time: 01/09/18 12:12 PM   Result Value Ref Range    Sodium 137 133 - 144 mmol/L    Potassium 4.0 3.4 - 5.3 mmol/L    Chloride 104 94 - 109 mmol/L    Carbon Dioxide 24 20 - 32 mmol/L    Anion Gap 9 3 - 14 mmol/L    Glucose 97 70 - 99 mg/dL    Urea Nitrogen 21 7 - 30 mg/dL    Creatinine 1.23 0.66 - 1.25 mg/dL    GFR Estimate 66 >60 mL/min/1.7m2    GFR Estimate If Black 80 >60 mL/min/1.7m2    Calcium 9.3 8.5 - 10.1 mg/dL   CBC with platelets    Collection Time: 01/09/18 12:12 PM   Result Value Ref Range    WBC 7.0 " 4.0 - 11.0 10e9/L    RBC Count 5.05 4.4 - 5.9 10e12/L    Hemoglobin 15.1 13.3 - 17.7 g/dL    Hematocrit 44.2 40.0 - 53.0 %    MCV 88 78 - 100 fl    MCH 29.9 26.5 - 33.0 pg    MCHC 34.2 31.5 - 36.5 g/dL    RDW 14.0 10.0 - 15.0 %    Platelet Count 128 (L) 150 - 450 10e9/L           Jake Sidhu MD

## 2018-01-16 NOTE — MR AVS SNAPSHOT
After Visit Summary   1/16/2018    Cricket Chen    MRN: [id number]           Patient Information     Date Of Birth          1980        Visit Information        Provider Department      1/16/2018 12:10 PM Laureen Galvan MD [facility] Hepatology        Today's Diagnoses     Liver replaced by transplant (H)    -  1       Follow-ups after your visit        Follow-up notes from your care team     Return in about 1 year (around 1/16/2019).      Your next 10 appointments already scheduled     Jan 16, 2018  2:20 PM CST   (Arrive by 1:50 PM)   Return Kidney Transplant with Jake Sidhu MD   [facility] Nephrology ([facility])    [address]  [address]  [place] [id number] [phone]            Jan 30, 2018 12:00 PM CST   (Arrive by 11:45 AM)   CT CHEST/ABDOMEN/PELVIS W CONTRAST with UCCT1   [facility] CT ([facility])    [address]  1st Floor  [place] [id number] [phone]           Please bring any scans or X-rays taken at other hospitals, if similar tests were done. Also bring a list of your medicines, including vitamins, minerals and over-the-counter drugs. It is safest to leave personal items at home.  Be sure to tell your doctor:   If you have any allergies.   If there s any chance you are pregnant.   If you are breastfeeding.   If you have any special needs.  You may have contrast for this exam. To prepare:   Do not eat or drink for 2 hours before your exam. If you need to take medicine, you may take it with small sips of water. (We may ask you to take liquid medicine as well.)   The day before your exam, drink extra fluids at least six 8-ounce glasses (unless your doctor tells you to restrict your fluids).  Patients over 70 or patients with diabetes or kidney problems:   If you haven t had a blood test (creatinine test) within the last 30 days, go to your clinic or Diagnostic  Imaging Department for this test.  If you have diabetes:   If your kidney function is normal, continue taking your metformin (Avandamet, Glucophage, Glucovance, Metaglip) on the day of your exam.   If your kidney function is abnormal, wait 48 hours before restarting this medicine.  You will have oral contrast for this exam:   You will drink the contrast at home. Get this from your clinic or Diagnostic Imaging Department. Please follow the directions given.  Please wear loose clothing, such as a sweat suit or jogging clothes. Avoid snaps, zippers and other metal. We may ask you to undress and put on a hospital gown.  If you have any questions, please call the Imaging Department where you will have your exam.            Feb 01, 2018  3:00 PM CST   Masonic Lab Draw with  MASONIC LAB DRAW   [facility] Masonic Lab Draw ([facility])    [address] [place] [address]  [address]  [facility] [id number] [phone]            Feb 01, 2018  3:30 PM CST   RETURN ONC with Girish Narayanan MD   [facility] Blood and Marrow Transplant ([facility])    [address] [place] [address]  [address]  [facility] [id number] [phone]            Feb 09, 2018  1:50 PM CST   (Arrive by 1:35 PM)   RETURN ENDOCRINE with Zuleyma Gray MD   [facility] Endocrinology ([facility])    [address] [place] [address]  3rd Floor  [facility] [id number] [phone]              Who to contact     If you have questions or need follow up information about today's clinic visit or your schedule please contact [facility] HEPATOLOGY directly at [phone].  Normal or non-critical lab and imaging results will be communicated to you by MyChart, letter or phone within 4 business days after the clinic has received the results. If you do not hear from us within 7 days, please contact the clinic through MyChart or phone. If you have a critical or abnormal lab result, we will notify you by phone  as soon as possible.  Submit refill requests through [employer] or call your pharmacy and they will forward the refill request to us. Please allow 3 business days for your refill to be completed.          Additional Information About Your Visit        [employer] Information     [employer] gives you secure access to your electronic health record. If you see a primary care provider, you can also send messages to your care team and make appointments. If you have questions, please call your primary care clinic.  If you do not have a primary care provider, please call [phone] and they will assist you.        Care EveryWhere ID     This is your Care EveryWhere ID. This could be used by other organizations to access your [facility] medical records  [id number]         Blood Pressure from Last 3 Encounters:   12/18/17 122/77   12/07/17 132/85   11/16/17 135/82    Weight from Last 3 Encounters:   12/18/17 96.6 kg (213 lb)   11/07/17 96.7 kg (213 lb 3 oz)   10/26/17 96.3 kg (212 lb 6.4 oz)              Today, you had the following     No orders found for display       Primary Care Provider Fax #    Physician No Ref-Primary [phone]       No address on file        Equal Access to Services     [facility] Center[facility]ET[facility] : Hadii steffany boldeno Sosalty, waaxda luqadaha, qaybta kaalmada adesusieyada, robina white . So [facility] [place] [phone].    ATENCIÓN: Si habla español, tiene a laura disposición servicios gratuitos de asistencia lingüística. Jaelyn al [phone].    We comply with applicable federal civil rights laws and [place] laws. We do not discriminate on the basis of race, color, national origin, age, disability, sex, sexual orientation, or gender identity.            Thank you!     Thank you for choosing [facility] HEPATOLOGY  for your care. Our goal is always to provide you with excellent care. Hearing back from our patients is one way we can continue to improve our services. Please take a few minutes to complete the  written survey that you may receive in the mail after your visit with us. Thank you!             Your Updated Medication List - Protect others around you: Learn how to safely use, store and throw away your medicines at www.disposemymeds.org.          This list is accurate as of: 1/16/18 12:17 PM.  Always use your most recent med list.                   Brand Name Dispense Instructions for use Diagnosis    amLODIPine 10 MG tablet    NORVASC    90 tablet    TAKE ONE TABLET BY MOUTH EVERY DAY    HTN (hypertension)       levothyroxine 150 MCG tablet    SYNTHROID/LEVOTHROID    90 tablet    Take 1 tablet (150 mcg) by mouth daily    Papillary thyroid carcinoma (H)       lisinopril 5 MG tablet    PRINIVIL/ZESTRIL    90 tablet    TAKE ONE TABLET BY MOUTH EVERY DAY    HTN (hypertension)       magnesium oxide 400 (241.3 MG) MG tablet    MAG-OX    120 tablet    Take 1 tablet (400 mg) by mouth 2 times daily    Hypomagnesemia       mycophenolate 250 MG capsule    GENERIC EQUIVALENT    60 capsule    Take 1 capsule (250 mg) by mouth 2 times daily    Kidney replaced by transplant       predniSONE 5 MG tablet    DELTASONE    90 tablet    Take 1 tablet (5 mg) by mouth daily    Liver replaced by transplant (H), S/P kidney transplant       sulfamethoxazole-trimethoprim 400-80 MG per tablet    BACTRIM/SEPTRA    30 tablet    Take 1 tablet by mouth daily    Liver replaced by transplant (H), S/P kidney transplant       tacrolimus 1 MG capsule    GENERIC EQUIVALENT    120 capsule    Take 2 capsules (2 mg) by mouth 2 times daily    Liver replaced by transplant (H), S/P kidney transplant       traMADol 50 MG tablet    ULTRAM    45 tablet    Take 1 tablet (50 mg) by mouth every 8 hours as needed for moderate pain    Chronic pain of left knee, Papillary thyroid carcinoma (H)

## 2018-01-16 NOTE — NURSING NOTE
Chief Complaint   Patient presents with     RECHECK     Post kidney tx follow up      Pt roomed, vitals, meds, and allergies reviewed with pt. Pt ready for provider.  George Brown, CMA

## 2018-01-16 NOTE — MR AVS SNAPSHOT
After Visit Summary   1/16/2018    Cricket Chen    MRN: [id number]           Patient Information     Date Of Birth          1980        Visit Information        Provider Department      1/16/2018 2:20 PM Jake Sidhu MD [facility] Nephrology        Today's Diagnoses     Hypomagnesemia    -  1    BK viremia        EBV (Abelino-Barr virus) viremia        Kidney replaced by transplant        Aftercare following organ transplant        Immunosuppressed status (H)        Secondary renal hyperparathyroidism (H)        Liver replaced by transplant (H)        Hypertension secondary to other renal disorders           Follow-ups after your visit        Follow-up notes from your care team     Return in about 6 months (around 7/16/2018).      Your next 10 appointments already scheduled     Jan 16, 2018  2:20 PM CST   (Arrive by 1:50 PM)   Return Kidney Transplant with Jake Sidhu MD   [facility] Nephrology ([facility])    [address]  [address]  [place] [id number] [phone]            Jan 30, 2018 12:00 PM CST   (Arrive by 11:45 AM)   CT CHEST/ABDOMEN/PELVIS W CONTRAST with UCCT1   [facility] Imaging [place] [facility] ([facility])    [address]  1st Floor  [place] [id number] [phone]           Please bring any scans or X-rays taken at other hospitals, if similar tests were done. Also bring a list of your medicines, including vitamins, minerals and over-the-counter drugs. It is safest to leave personal items at home.  Be sure to tell your doctor:   If you have any allergies.   If there s any chance you are pregnant.   If you are breastfeeding.   If you have any special needs.  You may have contrast for this exam. To prepare:   Do not eat or drink for 2 hours before your exam. If you need to take medicine, you may take it with small sips of water. (We may ask you to take liquid medicine as well.)   The day  before your exam, drink extra fluids at least six 8-ounce glasses (unless your doctor tells you to restrict your fluids).  Patients over 70 or patients with diabetes or kidney problems:   If you haven t had a blood test (creatinine test) within the last 30 days, go to your clinic or Diagnostic Imaging Department for this test.  If you have diabetes:   If your kidney function is normal, continue taking your metformin (Avandamet, Glucophage, Glucovance, Metaglip) on the day of your exam.   If your kidney function is abnormal, wait 48 hours before restarting this medicine.  You will have oral contrast for this exam:   You will drink the contrast at home. Get this from your clinic or Diagnostic Imaging Department. Please follow the directions given.  Please wear loose clothing, such as a sweat suit or jogging clothes. Avoid snaps, zippers and other metal. We may ask you to undress and put on a hospital gown.  If you have any questions, please call the Imaging Department where you will have your exam.            Feb 01, 2018  3:00 PM CST   Masonic Lab Draw with  MASONIC LAB DRAW   [facility] Masonic Lab Draw ([facility])    [address]  [address]  [facility] [address]   [phone]            Feb 01, 2018  3:30 PM CST   RETURN ONC with Girish Narayanan MD   [facility] Blood and Marrow Transplant ([facility])    [address]  [address]  [facility] [address]   [phone]            Feb 09, 2018  1:50 PM CST   (Arrive by 1:35 PM)   RETURN ENDOCRINE with Zuleyma Gray MD   [facility] Endocrinology ([facility])    [address]  3rd Floor  [place] [facility] [address]   [phone]            Aug 06, 2018  2:20 PM CDT   (Arrive by 1:50 PM)   Return Kidney Transplant with Jake Sidhu MD   [facility] Nephrology ([facility])    [address] [place] [address]  [address]  [facility]  [address]   [phone]            George 15, 2019 12:10 PM CST   (Arrive by 11:55 AM)   Return Liver Transplant with Laureen Galvan MD   [facility] Hepatology ([facility])    [address] [place] [address]  [address]  [facility] [address]   [phone]            George 15, 2019  1:05 PM CST   (Arrive by 12:35 PM)   Return Kidney Transplant with Jake Sidhu MD   [facility] [place] [facility] Nephrology ([facility])    [address] [place] [address]  [address]  [facility] [address]   [phone]              Future tests that were ordered for you today     Open Future Orders        Priority Expected Expires Ordered    EBV DNA PCR Quantitative Whole Blood Routine  2/15/2018 1/16/2018    BK virus PCR quantitative Routine  2/15/2018 1/16/2018    Magnesium Routine  2/15/2018 1/16/2018            Who to contact     If you have questions or need follow up information about today's clinic visit or your schedule please contact [facility] NEPHROLOGY directly at [phone].  Normal or non-critical lab and imaging results will be communicated to you by [employer], letter or phone within 4 business days after the clinic has received the results. If you do not hear from us within 7 days, please contact the clinic through [employer] or phone. If you have a critical or abnormal lab result, we will notify you by phone as soon as possible.  Submit refill requests through [employer] or call your pharmacy and they will forward the refill request to us. Please allow 3 business days for your refill to be completed.          Additional Information About Your Visit        [employer] Information     [employer] gives you secure access to your electronic health record. If you see a primary care provider, you can also send messages to your care team and make appointments. If you have questions, please call your primary care clinic.  If you do not have a primary care provider, please call [phone] and they will  "assist you.        Care EveryWhere ID     This is your Care EveryWhere ID. This could be used by other organizations to access your [place] medical records  [id number]        Your Vitals Were     Pulse Temperature Height Pulse Oximetry BMI (Body Mass Index)       71 97.8  F (36.6  C) (Oral) 1.778 m (5' 10\") 98% 30.42 kg/m2        Blood Pressure from Last 3 Encounters:   01/16/18 132/88   01/16/18 132/88   12/18/17 122/77    Weight from Last 3 Encounters:   01/16/18 96.2 kg (212 lb)   01/16/18 96.2 kg (212 lb)   12/18/17 96.6 kg (213 lb)               Primary Care Provider Fax #    Physician No Ref-Primary [phone]       No address on file        Equal Access to Services     [facility]ET[facility]O : Ana M interiano Sosalty, waaxda luqadaha, qaybta kaalmada kostasyacindi, robina white . So [facility] [phone].    ATENCIÓN: Si habla español, tiene a laura disposición servicios gratuitos de asistencia lingüística. Jaelyn al [phone].    We comply with applicable federal civil rights laws and [place] laws. We do not discriminate on the basis of race, color, national origin, age, disability, sex, sexual orientation, or gender identity.            Thank you!     Thank you for choosing [facility] NEPHROLOGY  for your care. Our goal is always to provide you with excellent care. Hearing back from our patients is one way we can continue to improve our services. Please take a few minutes to complete the written survey that you may receive in the mail after your visit with us. Thank you!             Your Updated Medication List - Protect others around you: Learn how to safely use, store and throw away your medicines at www.disposemymeds.org.          This list is accurate as of: 1/16/18  1:04 PM.  Always use your most recent med list.                   Brand Name Dispense Instructions for use Diagnosis    amLODIPine 10 MG tablet    NORVASC    90 tablet    TAKE ONE TABLET BY MOUTH EVERY DAY    HTN " (hypertension)       levothyroxine 150 MCG tablet    SYNTHROID/LEVOTHROID    90 tablet    Take 1 tablet (150 mcg) by mouth daily    Papillary thyroid carcinoma (H)       lisinopril 5 MG tablet    PRINIVIL/ZESTRIL    90 tablet    TAKE ONE TABLET BY MOUTH EVERY DAY    HTN (hypertension)       magnesium oxide 400 (241.3 MG) MG tablet    MAG-OX    120 tablet    Take 1 tablet (400 mg) by mouth 2 times daily    Hypomagnesemia       mycophenolate 250 MG capsule    GENERIC EQUIVALENT    60 capsule    Take 1 capsule (250 mg) by mouth 2 times daily    Kidney replaced by transplant       predniSONE 5 MG tablet    DELTASONE    90 tablet    Take 1 tablet (5 mg) by mouth daily    Liver replaced by transplant (H), S/P kidney transplant       sulfamethoxazole-trimethoprim 400-80 MG per tablet    BACTRIM/SEPTRA    30 tablet    Take 1 tablet by mouth daily    Liver replaced by transplant (H), S/P kidney transplant       tacrolimus 1 MG capsule    GENERIC EQUIVALENT    120 capsule    Take 2 capsules (2 mg) by mouth 2 times daily    Liver replaced by transplant (H), S/P kidney transplant       traMADol 50 MG tablet    ULTRAM    45 tablet    Take 1 tablet (50 mg) by mouth every 8 hours as needed for moderate pain    Chronic pain of left knee, Papillary thyroid carcinoma (H)

## 2018-01-16 NOTE — PROGRESS NOTES
Assessment and Plan:  1. DDKT - baseline Cr ~ 1.0-1.2, which had been running a bit higher, but now back to baseline over the last month.  Normal proteinuria.  Patient did have DSA at time of transplant, but no DSA with last check.  If creatinine increases, especially in light of new BK viremia, would have low threshold for a kidney transplant biopsy.  Will make no changes in immunosuppression at this time.  Target tacrolimus 3-5.  2. HTN - well controlled at target of less than 140/90.  No changes.  3. 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  IgG level is normal.  Will recheck BK PCR and if stable or increased, would recommend stopping mycophenolate mofetil again and give 2 doses of IVIg.  If level is significantly decreased, would just follow.  4. Secondary renal hyperparathyroidism - previously mildly elevated PTH with last check.  Will check PTH and vitamin D level at next clinic visit.  5. Hypomagnesemia - normal serum magnesium level with last check and will continue on oral magnesium supplement.  Will recheck serum magnesium level with next labs.  6. Thrombocytopenia - stable platelet level, generally 80-120s.  7. Liver transplant - appears stable with normal LFTs.  8. PTLD, s/p rituximab and chemotherapy - no evidence of recurrence.  Patient to be reimaged in a couple of weeks and follows up with Oncology.  9. EBV viremia - negative EBV viremia with last check.  Will recheck EBV PCR with next labs.  10. Papillary thyroid cancer, s/p thyroidectomy - doing well on levothyroxine.  11. Skin cancer risk - no new skin lesions.  Recommend regular follow up with Dermatology.  12. Recommend return visit in 6 months.    Assessment and plan was discussed with patient and he voiced his understanding and agreement.    Reason for Visit:  Mr. Chen is here for routine follow up.    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         Transplant Hx:       Tx: DDKT  Date: 5/11/16       Tx: Liver Tx Date: 5/11/16       Present Maintenance IS: Tacrolimus, Mycophenolate mofetil and Prednisone       Baseline Creatinine: 1.0-1.2       Recent DSA: No  Date last checked: 12/2017       Biopsy: No    Mr. Chen reports feeling good overall with minimal medical complaints.  He recently had a trend up in his serum creatinine and because he is on lower immunosuppression due to recent PTLD, he was scheduled for a kidney transplant biopsy to rule out acute rejection.  At the time of the biopsy, his creatinine was back at baseline, so the biopsy was canceled.  Because of patient's lower immunosuppression and previous positive DSA, he was restarted on low dose mycophenolate mofetil 250 mg bid, along with his tacrolimus and prednisone.  However, labs from that day came back later showing new BK viremia of ~ 110K.  Repeat BK will be done this next week.  Also, his DSA on the day of the proposed biopsy came back negative.    His energy level is good and remains normal.  He is active and has been getting some exercise with playing hockey with friends.  Denies any chest pain or shortness of breath with exertion.  Appetite is good and weight is stable.  No nausea, vomiting or diarrhea.  No fever, sweats or chills.  No leg swelling.    Home BP: 130/70-80s.      ROS:   A comprehensive review of systems was obtained and negative, except as noted in the HPI or PMH.    Active Medical Problems:  Patient Active Problem List   Diagnosis     Atrial flutter (H)     Complete transposition of great vessels     Esophageal varices (H)     Insomnia     Alcoholic hepatitis     History of alcohol abuse     History of transposition of great vessels     Depression     Thrombocytopenia (H)     Pain medication agreement     Congenital anomaly of heart     Liver replaced by transplant (H)     Kidney replaced by transplant     Immunosuppressed status (H)      Hypomagnesemia     Secondary renal hyperparathyroidism (H)     Chronic pain syndrome     Pain management contract signed     Aftercare following organ transplant     Post-transplant lymphoproliferative disorder (H)     Papillary thyroid carcinoma (H)     Advance directive discussed with patient     Acute alcoholic liver disease     Alcohol dependence (H)     Encounter for palliative care     Hypertension secondary to other renal disorders       Personal Hx:  Social History     Social History     Marital status: Single     Spouse name: N/A     Number of children: 0     Years of education: N/A     Occupational History      Unemployed     Social History Main Topics     Smoking status: Former Smoker     Packs/day: 0.33     Years: 3.00     Types: Cigarettes     Start date: 8/20/1997     Quit date: 9/21/2000     Smokeless tobacco: Former User     Alcohol use No      Comment: ~10 drinks per day for ten years, quit in April of 2014     Drug use: No     Sexual activity: Not Currently     Partners: Female     Birth control/ protection: None     Other Topics Concern     Not on file     Social History Narrative    ? Blood transfusion as baby during surgery,    Professional performed tattoos     No Illicit IV or intranasal drug use.        Allergies:  Allergies   Allergen Reactions     Hydromorphone Itching       Medications:  Prior to Admission medications    Medication Sig Start Date End Date Taking? Authorizing Provider   traMADol (ULTRAM) 50 MG tablet Take 1 tablet (50 mg) by mouth every 8 hours as needed for moderate pain 12/14/16  Yes Antoinette Nelson MD   magnesium oxide (MAG-OX) 400 (241.3 MG) MG tablet Take 2 tablets (800 mg) by mouth 2 times daily 12/8/16  Yes Jake Sidhu MD   PROGRAF 1 MG PO CAPSULE 4 mg AM and 3 mg PM 10/26/16  Yes Laureen Galvan MD   amLODIPine (NORVASC) 10 MG tablet Take 1 tablet (10 mg) by mouth daily 10/11/16  Yes Jake Sidhu MD   lisinopril  "(PRINIVIL,ZESTRIL) 5 MG tablet Take 1 tablet (5 mg) by mouth daily 10/11/16  Yes Jake Sidhu MD   mycophenolate (CELLCEPT - GENERIC EQUIVALENT) 250 MG capsule Take 3 capsules (750 mg) by mouth 2 times daily 8/5/16  Yes Vinod Bermeo MD   sulfamethoxazole-trimethoprim (BACTRIM,SEPTRA) 400-80 MG per tablet Take 1 tablet by mouth daily 5/17/16  Yes Lorenza Alonso PA-C   pantoprazole (PROTONIX) 20 MG tablet Take 1 tablet (20 mg) by mouth daily 3/16/16  Yes Laureen Galvan MD       Vitals:  /88  Pulse 71  Temp 97.8  F (36.6  C) (Oral)  Ht 1.778 m (5' 10\")  Wt 96.2 kg (212 lb)  SpO2 98%  BMI 30.42 kg/m2    Exam:   GENERAL APPEARANCE: alert and no distress  HENT: mouth without ulcers or lesions  LYMPHATICS: no cervical or supraclavicular nodes  RESP: lungs clear to auscultation - no rales, rhonchi or wheezes  CV: regular rhythm, normal rate, no rub, no murmur  EDEMA: no LE edema bilaterally  ABDOMEN: soft, nondistended, nontender, bowel sounds normal  MS: extremities normal - no gross deformities noted, no evidence of inflammation in joints, no muscle tenderness  SKIN: no rash  TX KIDNEY: normal    Results:   Recent Results (from the past 504 hour(s))   IgG    Collection Time: 01/02/18 12:25 PM   Result Value Ref Range     695 - 1620 mg/dL   Basic metabolic panel    Collection Time: 01/09/18 12:12 PM   Result Value Ref Range    Sodium 137 133 - 144 mmol/L    Potassium 4.0 3.4 - 5.3 mmol/L    Chloride 104 94 - 109 mmol/L    Carbon Dioxide 24 20 - 32 mmol/L    Anion Gap 9 3 - 14 mmol/L    Glucose 97 70 - 99 mg/dL    Urea Nitrogen 21 7 - 30 mg/dL    Creatinine 1.23 0.66 - 1.25 mg/dL    GFR Estimate 66 >60 mL/min/1.7m2    GFR Estimate If Black 80 >60 mL/min/1.7m2    Calcium 9.3 8.5 - 10.1 mg/dL   CBC with platelets    Collection Time: 01/09/18 12:12 PM   Result Value Ref Range    WBC 7.0 4.0 - 11.0 10e9/L    RBC Count 5.05 4.4 - 5.9 10e12/L    Hemoglobin 15.1 13.3 - " 17.7 g/dL    Hematocrit 44.2 40.0 - 53.0 %    MCV 88 78 - 100 fl    MCH 29.9 26.5 - 33.0 pg    MCHC 34.2 31.5 - 36.5 g/dL    RDW 14.0 10.0 - 15.0 %    Platelet Count 128 (L) 150 - 450 10e9/L

## 2018-01-16 NOTE — PROGRESS NOTES
[facility]  LIVER TRANSPLANT CLINIC    A/P  37 year old male s/p LKT 5/2016 for ETOH.  Liver doing very well.  Post transplant course c/b PTLD and thyroid ca. Oncology monitoring with next imaging next mo. Due for EBV.   IS per Dr. Sidhu. No changes to medications today. I discussed with Dr. Sidhu.  Labs up to date.  Due for derm.  Will see back in 1 year    Laureen Galvan MD  Hepatology/ Liver Transplant  [facility]  ===================================================================  PCP: DR. David Moser      SUBJECTIVE   37 year old male s/p DDLKT 5/2016 ETOH.  EXPLANT: No HCC  IS: Prograf 3-5 and MMF, pred 5 (per Dr. Sidhu)  LABS: Up to date, excellent liver function.  REJECTION: None.  BILIARY ISSUES: None  STENT: Out  KIDNEY FUNCTION:   Creatinine   Date Value Ref Range Status   01/09/2018 1.23 0.66 - 1.25 mg/dL Final     BP: Good. Lisinopril, amlodipine  PREV:  No derm done. Flu shot done this year.  DISEASE RECURRENCE: Sober since prior to transplant  OTHER ISSUES:   PTLD last EBV March 2017 undetectable  Thyroid cancer, s/p thyroidectomy last surveillance July negative  Weight gain.   Had fistula out.      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 Would like to get back to playing hockey but knows he needs to lose weight.  ROS: 10 point ROS neg other than the symptoms noted above in the HPI.    OBJECTIVE  There were no vitals taken for this visit.  GENERAL:  Very pleasant, well-appearing, in no acute distress.    HEENT:  No icterus, no oral lesions.    LYMPH:  No supraclavicular or cervical lymphadenopathy.    CARDIOVASCULAR:  Regular rate and rhythm.    CHEST:  Lungs are clear.    ABDOMEN:  Bowel sounds are present.  Abdomen is soft, nontender, nondistended.  Scar is well healed.      EXTREMITIES:  No edema.    SKIN:  No rash.  Multiple tattoos  NEUROLOGIC:  Speech is fluent and clear.  No asterixis or tremor.      Creatinine   Date Value Ref Range Status    01/09/2018 1.23 0.66 - 1.25 mg/dL Final   ]   Lab Results   Component Value Date    BILITOTAL 0.9 12/12/2017    BILITOTAL 0.5 10/26/2017    BILITOTAL 0.6 07/20/2017    BILITOTAL 0.8 06/07/2017    BILITOTAL 0.3 05/17/2017    BILITOTAL 0.3 05/17/2017      Lab Results   Component Value Date    ALT 41 12/12/2017      Lab Results   Component Value Date    ALBUMIN 4.3 12/12/2017       Hemoglobin   Date Value Ref Range Status   01/09/2018 15.1 13.3 - 17.7 g/dL Final   ]    Current Outpatient Prescriptions   Medication     mycophenolate (GENERIC EQUIVALENT) 250 MG capsule     lisinopril (PRINIVIL/ZESTRIL) 5 MG tablet     amLODIPine (NORVASC) 10 MG tablet     traMADol (ULTRAM) 50 MG tablet     levothyroxine (SYNTHROID/LEVOTHROID) 150 MCG tablet     magnesium oxide (MAG-OX) 400 (241.3 MG) MG tablet     tacrolimus (PROGRAF - GENERIC EQUIVALENT) 1 MG capsule     predniSONE (DELTASONE) 5 MG tablet     sulfamethoxazole-trimethoprim (BACTRIM/SEPTRA) 400-80 MG per tablet     No current facility-administered medications for this visit.      .

## 2018-01-16 NOTE — NURSING NOTE
Chief Complaint   Patient presents with     RECHECK     Post Liver TXP   Pt roomed, vitals, meds, and allergies reviewed with pt. Pt ready for provider.  George Brown, CMA

## 2018-01-16 NOTE — LETTER
1/16/2018       RE: Cricket Chen  [address]  [place] [address]     Dear Colleague,    Thank you for referring your patient, Cricket Chen, to the [facility] HEPATOLOGY at [facility]. Please see a copy of my visit note below.    [facility]  LIVER TRANSPLANT CLINIC    A/P  37 year old male s/p LKT 5/2016 for ETOH.  Liver doing very well.  Post transplant course c/b PTLD and thyroid ca. Oncology monitoring with next imaging next mo. Due for EBV.   IS per Dr. Sidhu. No changes to medications today. I discussed with Dr. Sidhu.  Labs up to date.  Due for derm.  Will see back in 1 year    Laureen Galvan MD  Hepatology/ Liver Transplant  [facility]  ===================================================================  PCP: DR. David Moser      SUBJECTIVE   37 year old male s/p DDLKT 5/2016 ETOH.  EXPLANT: No HCC  IS: Prograf 3-5 and MMF, pred 5 (per Dr. Sidhu)  LABS: Up to date, excellent liver function.  REJECTION: None.  BILIARY ISSUES: None  STENT: Out  KIDNEY FUNCTION:   Creatinine   Date Value Ref Range Status   01/09/2018 1.23 0.66 - 1.25 mg/dL Final     BP: Good. Lisinopril, amlodipine  PREV:  No derm done. Flu shot done this year.  DISEASE RECURRENCE: Sober since prior to transplant  OTHER ISSUES:   PTLD last EBV March 2017 undetectable  Thyroid cancer, s/p thyroidectomy last surveillance July negative  Weight gain.   Had fistula out.      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 Would like to get back to playing hockey but knows he needs to lose weight.  ROS: 10 point ROS neg other than the symptoms noted above in the HPI.    OBJECTIVE  There were no vitals taken for this visit.  GENERAL:  Very pleasant, well-appearing, in no acute distress.    HEENT:  No icterus, no oral lesions.    LYMPH:  No supraclavicular or cervical lymphadenopathy.    CARDIOVASCULAR:  Regular rate and rhythm.    CHEST:  Lungs are clear.     ABDOMEN:  Bowel sounds are present.  Abdomen is soft, nontender, nondistended.  Scar is well healed.      EXTREMITIES:  No edema.    SKIN:  No rash.  Multiple tattoos  NEUROLOGIC:  Speech is fluent and clear.  No asterixis or tremor.      Creatinine   Date Value Ref Range Status   01/09/2018 1.23 0.66 - 1.25 mg/dL Final   ]   Lab Results   Component Value Date    BILITOTAL 0.9 12/12/2017    BILITOTAL 0.5 10/26/2017    BILITOTAL 0.6 07/20/2017    BILITOTAL 0.8 06/07/2017    BILITOTAL 0.3 05/17/2017    BILITOTAL 0.3 05/17/2017      Lab Results   Component Value Date    ALT 41 12/12/2017      Lab Results   Component Value Date    ALBUMIN 4.3 12/12/2017       Hemoglobin   Date Value Ref Range Status   01/09/2018 15.1 13.3 - 17.7 g/dL Final   ]    Current Outpatient Prescriptions   Medication     mycophenolate (GENERIC EQUIVALENT) 250 MG capsule     lisinopril (PRINIVIL/ZESTRIL) 5 MG tablet     amLODIPine (NORVASC) 10 MG tablet     traMADol (ULTRAM) 50 MG tablet     levothyroxine (SYNTHROID/LEVOTHROID) 150 MCG tablet     magnesium oxide (MAG-OX) 400 (241.3 MG) MG tablet     tacrolimus (PROGRAF - GENERIC EQUIVALENT) 1 MG capsule     predniSONE (DELTASONE) 5 MG tablet     sulfamethoxazole-trimethoprim (BACTRIM/SEPTRA) 400-80 MG per tablet     No current facility-administered medications for this visit.      .       Again, thank you for allowing me to participate in the care of your patient.      Sincerely,    Laureen Galvan MD

## 2018-01-26 DIAGNOSIS — B34.8 BK VIREMIA: ICD-10-CM

## 2018-01-26 DIAGNOSIS — E83.42 HYPOMAGNESEMIA: ICD-10-CM

## 2018-01-26 DIAGNOSIS — Z94.0 KIDNEY TRANSPLANTED: ICD-10-CM

## 2018-01-26 DIAGNOSIS — B27.00 EBV (EPSTEIN-BARR VIRUS) VIREMIA: ICD-10-CM

## 2018-01-26 LAB
ANION GAP SERPL CALCULATED.3IONS-SCNC: 8 MMOL/L (ref 3–14)
BUN SERPL-MCNC: 16 MG/DL (ref 7–30)
CALCIUM SERPL-MCNC: 9.1 MG/DL (ref 8.5–10.1)
CHLORIDE SERPL-SCNC: 107 MMOL/L (ref 94–109)
CO2 SERPL-SCNC: 26 MMOL/L (ref 20–32)
CREAT SERPL-MCNC: 1.31 MG/DL (ref 0.66–1.25)
ERYTHROCYTE [DISTWIDTH] IN BLOOD BY AUTOMATED COUNT: 14.1 % (ref 10–15)
GFR SERPL CREATININE-BSD FRML MDRD: 61 ML/MIN/1.7M2
GLUCOSE SERPL-MCNC: 95 MG/DL (ref 70–99)
HCT VFR BLD AUTO: 43.6 % (ref 40–53)
HGB BLD-MCNC: 15 G/DL (ref 13.3–17.7)
MAGNESIUM SERPL-MCNC: 1.9 MG/DL (ref 1.6–2.3)
MCH RBC QN AUTO: 30.3 PG (ref 26.5–33)
MCHC RBC AUTO-ENTMCNC: 34.4 G/DL (ref 31.5–36.5)
MCV RBC AUTO: 88 FL (ref 78–100)
PLATELET # BLD AUTO: 137 10E9/L (ref 150–450)
POTASSIUM SERPL-SCNC: 4 MMOL/L (ref 3.4–5.3)
RBC # BLD AUTO: 4.95 10E12/L (ref 4.4–5.9)
SODIUM SERPL-SCNC: 141 MMOL/L (ref 133–144)
TACROLIMUS BLD-MCNC: 5.2 UG/L (ref 5–15)
TME LAST DOSE: NORMAL H
WBC # BLD AUTO: 5.7 10E9/L (ref 4–11)

## 2018-01-26 PROCEDURE — 80197 ASSAY OF TACROLIMUS: CPT | Performed by: INTERNAL MEDICINE

## 2018-01-26 PROCEDURE — 83735 ASSAY OF MAGNESIUM: CPT | Performed by: INTERNAL MEDICINE

## 2018-01-26 PROCEDURE — 36415 COLL VENOUS BLD VENIPUNCTURE: CPT | Performed by: INTERNAL MEDICINE

## 2018-01-26 PROCEDURE — 85027 COMPLETE CBC AUTOMATED: CPT | Performed by: INTERNAL MEDICINE

## 2018-01-26 PROCEDURE — 87799 DETECT AGENT NOS DNA QUANT: CPT | Performed by: INTERNAL MEDICINE

## 2018-01-26 PROCEDURE — 80048 BASIC METABOLIC PNL TOTAL CA: CPT | Performed by: INTERNAL MEDICINE

## 2018-01-29 LAB
BKV DNA # SPEC NAA+PROBE: ABNORMAL COPIES/ML
BKV DNA SPEC NAA+PROBE-LOG#: 4.9 LOG COPIES/ML
EBV DNA # SPEC NAA+PROBE: NORMAL {COPIES}/ML
EBV DNA SPEC NAA+PROBE-LOG#: NORMAL {LOG_COPIES}/ML
SPECIMEN SOURCE: ABNORMAL

## 2018-01-30 ENCOUNTER — RADIANT APPOINTMENT (OUTPATIENT)
Dept: CT IMAGING | Facility: CLINIC | Age: 38
End: 2018-01-30
Attending: INTERNAL MEDICINE
Payer: COMMERCIAL

## 2018-01-30 DIAGNOSIS — D47.Z1 PTLD (POST-TRANSPLANT LYMPHOPROLIFERATIVE DISORDER) (H): ICD-10-CM

## 2018-01-30 RX ORDER — IOPAMIDOL 755 MG/ML
130 INJECTION, SOLUTION INTRAVASCULAR ONCE
Status: COMPLETED | OUTPATIENT
Start: 2018-01-30 | End: 2018-01-30

## 2018-01-30 RX ORDER — HEPARIN SODIUM (PORCINE) LOCK FLUSH IV SOLN 100 UNIT/ML 100 UNIT/ML
5 SOLUTION INTRAVENOUS ONCE
Status: COMPLETED | OUTPATIENT
Start: 2018-01-30 | End: 2018-01-30

## 2018-01-30 RX ADMIN — IOPAMIDOL 130 ML: 755 INJECTION, SOLUTION INTRAVASCULAR at 12:09

## 2018-01-30 RX ADMIN — HEPARIN SODIUM (PORCINE) LOCK FLUSH IV SOLN 100 UNIT/ML 5 ML: 100 SOLUTION at 12:14

## 2018-01-30 NOTE — DISCHARGE INSTRUCTIONS

## 2018-02-01 ENCOUNTER — TELEPHONE (OUTPATIENT)
Dept: TRANSPLANT | Facility: CLINIC | Age: 38
End: 2018-02-01

## 2018-02-01 ENCOUNTER — OFFICE VISIT (OUTPATIENT)
Dept: TRANSPLANT | Facility: CLINIC | Age: 38
End: 2018-02-01
Attending: INTERNAL MEDICINE
Payer: COMMERCIAL

## 2018-02-01 ENCOUNTER — APPOINTMENT (OUTPATIENT)
Dept: LAB | Facility: CLINIC | Age: 38
End: 2018-02-01
Attending: INTERNAL MEDICINE
Payer: COMMERCIAL

## 2018-02-01 VITALS
TEMPERATURE: 98 F | WEIGHT: 213.4 LBS | SYSTOLIC BLOOD PRESSURE: 127 MMHG | HEIGHT: 70 IN | BODY MASS INDEX: 30.55 KG/M2 | RESPIRATION RATE: 18 BRPM | DIASTOLIC BLOOD PRESSURE: 83 MMHG | HEART RATE: 79 BPM | OXYGEN SATURATION: 97 %

## 2018-02-01 DIAGNOSIS — Z94.0 KIDNEY REPLACED BY TRANSPLANT: Primary | ICD-10-CM

## 2018-02-01 DIAGNOSIS — Z94.4 LIVER TRANSPLANTED (H): ICD-10-CM

## 2018-02-01 DIAGNOSIS — D47.Z1 PTLD (POST-TRANSPLANT LYMPHOPROLIFERATIVE DISORDER) (H): ICD-10-CM

## 2018-02-01 LAB
ALBUMIN SERPL-MCNC: 4.6 G/DL (ref 3.4–5)
ALP SERPL-CCNC: 97 U/L (ref 40–150)
ALT SERPL W P-5'-P-CCNC: 30 U/L (ref 0–70)
ANION GAP SERPL CALCULATED.3IONS-SCNC: 8 MMOL/L (ref 3–14)
AST SERPL W P-5'-P-CCNC: 19 U/L (ref 0–45)
BASOPHILS # BLD AUTO: 0 10E9/L (ref 0–0.2)
BASOPHILS NFR BLD AUTO: 0.3 %
BILIRUB SERPL-MCNC: 0.6 MG/DL (ref 0.2–1.3)
BUN SERPL-MCNC: 17 MG/DL (ref 7–30)
CALCIUM SERPL-MCNC: 9.3 MG/DL (ref 8.5–10.1)
CHLORIDE SERPL-SCNC: 104 MMOL/L (ref 94–109)
CO2 SERPL-SCNC: 24 MMOL/L (ref 20–32)
CREAT SERPL-MCNC: 1.26 MG/DL (ref 0.66–1.25)
DIFFERENTIAL METHOD BLD: ABNORMAL
EOSINOPHIL # BLD AUTO: 0 10E9/L (ref 0–0.7)
EOSINOPHIL NFR BLD AUTO: 0.2 %
ERYTHROCYTE [DISTWIDTH] IN BLOOD BY AUTOMATED COUNT: 13.2 % (ref 10–15)
GFR SERPL CREATININE-BSD FRML MDRD: 64 ML/MIN/1.7M2
GLUCOSE SERPL-MCNC: 109 MG/DL (ref 70–99)
HCT VFR BLD AUTO: 44.6 % (ref 40–53)
HGB BLD-MCNC: 15.4 G/DL (ref 13.3–17.7)
IMM GRANULOCYTES # BLD: 0 10E9/L (ref 0–0.4)
IMM GRANULOCYTES NFR BLD: 0.3 %
LDH SERPL L TO P-CCNC: 200 U/L (ref 85–227)
LYMPHOCYTES # BLD AUTO: 0.4 10E9/L (ref 0.8–5.3)
LYMPHOCYTES NFR BLD AUTO: 4.2 %
MCH RBC QN AUTO: 30.1 PG (ref 26.5–33)
MCHC RBC AUTO-ENTMCNC: 34.5 G/DL (ref 31.5–36.5)
MCV RBC AUTO: 87 FL (ref 78–100)
MONOCYTES # BLD AUTO: 0.5 10E9/L (ref 0–1.3)
MONOCYTES NFR BLD AUTO: 5.4 %
NEUTROPHILS # BLD AUTO: 8.6 10E9/L (ref 1.6–8.3)
NEUTROPHILS NFR BLD AUTO: 89.6 %
NRBC # BLD AUTO: 0 10*3/UL
NRBC BLD AUTO-RTO: 0 /100
PLATELET # BLD AUTO: 134 10E9/L (ref 150–450)
POTASSIUM SERPL-SCNC: 4.5 MMOL/L (ref 3.4–5.3)
PROT SERPL-MCNC: 7.9 G/DL (ref 6.8–8.8)
RBC # BLD AUTO: 5.12 10E12/L (ref 4.4–5.9)
SODIUM SERPL-SCNC: 136 MMOL/L (ref 133–144)
WBC # BLD AUTO: 9.6 10E9/L (ref 4–11)

## 2018-02-01 PROCEDURE — 83615 LACTATE (LD) (LDH) ENZYME: CPT | Performed by: INTERNAL MEDICINE

## 2018-02-01 PROCEDURE — 80053 COMPREHEN METABOLIC PANEL: CPT | Performed by: INTERNAL MEDICINE

## 2018-02-01 PROCEDURE — 85025 COMPLETE CBC W/AUTO DIFF WBC: CPT | Performed by: INTERNAL MEDICINE

## 2018-02-01 PROCEDURE — G0463 HOSPITAL OUTPT CLINIC VISIT: HCPCS | Mod: ZF

## 2018-02-01 RX ORDER — HEPARIN SODIUM (PORCINE) LOCK FLUSH IV SOLN 100 UNIT/ML 100 UNIT/ML
5 SOLUTION INTRAVENOUS EVERY 8 HOURS
Status: DISCONTINUED | OUTPATIENT
Start: 2018-02-01 | End: 2018-02-07 | Stop reason: HOSPADM

## 2018-02-01 ASSESSMENT — PAIN SCALES - GENERAL: PAINLEVEL: NO PAIN (0)

## 2018-02-01 NOTE — NURSING NOTE
Chief Complaint   Patient presents with     Blood Draw     Left AC butterfly  X 1, labs drawn and sent, vitals completed, checked into next appointment.   Desiree Millan RN

## 2018-02-01 NOTE — PROGRESS NOTES
REASON FOR VISIT:  Followup for history of PTLD, status post 4 weekly Rituxan and 4 cycles of R-CEOP presenting for the follow up.      HISTORY OF PRESENT ILLNESS/REVIEW OF SYSTEMS:  Mr. Chen is a very pleasant 37 year old gentleman, with a prior history as outlined above, who was diagnosed with PTLD early this year based on an excisional lymph node biopsy from 02/01/2017 (right neck biopsy of partial necrotic mass).  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  CellCept was reduced from 1500 b.i.d. to 750 twice a day, and  tacrolimus 3 mg twice a day was decreased to 2 mg twice a day. He has tolerated this  RSST very well. He underwent end of Rx PET/CT scan and had achieved CR1.     Of note, his diagnostic PET/CT scan identified a thyroid nodule, which was consistent with papillary thyroid carcinoma based on the ultrasound-guided fine needle aspiration. He underwent thyroidectomy.    INTERVAL HISTORY:    Cricket is here for followup.  He reports feeling relatively well.  He denies any new lymphadenopathy.  Denies any drenching night sweats, fevers, chills.  He is active.  He has a good energy level and good appetite.  He really does not have any concerns at this time.  He is following with Nephrology and the transplant team.  The patient is following with Dr. Sidhu from Transplant Nephrology, and patient noted to have elevation in his creatinine and was planned for a kidney biopsy to rule out possible transplant rejection.  However, the creatinine normalized and the biopsy was canceled.  The patient was restarted on mycophenolate, low dose, 250 mg b.i.d.  He is on tacrolimus stable dose and prednisone 5 mg.  His  labs showed BK viremia, and patient is following with Nephrology for that.  He is asymptomatic from this standpoint.  Otherwise, he denies any chest pain, dyspnea on exertion, nausea, vomiting or  "diarrhea.      The rest of 12 points of ROS were reviewed and found to be negative, unless as mentioned above.      PHYSICAL EXAMINATION:   /83 (BP Location: Left arm, Patient Position: Sitting, Cuff Size: Adult Regular)  Pulse 79  Temp 98  F (36.7  C) (Oral)  Resp 18  Ht 1.778 m (5' 10\")  Wt 96.8 kg (213 lb 6.4 oz)  SpO2 97%  BMI 30.62 kg/m2  GENERAL:  Not in acute distress, alert and oriented, well-nourished and hydrated.   ECOG PERFORMANCE STATUS:  0.   KPS:  100%.   HEENT:  Moist mucous membranes with no thrush.  Pupils are equally round and reactive to light with no conjunctival erythema or jaundice.   NECK:  No palpable masses/lymphadenopathy.   Well healed scar in the neck  CARDIOVASCULAR:  Regular rate and rhythm, no murmurs.   PULMONARY:  Clear to auscultation bilaterally.   ABDOMEN:  Soft, nontender and nondistended, no palpable masses, no splenomegaly  EXTREMITIES:  No bilateral ankle edema.   SKIN:  No rashes  NEUROLOGIC:  Grossly nonfocal.      LABORATORY DATA:     Hematology Studies   Recent Labs   Lab Test  02/01/18   1503  01/26/18   1121  01/09/18   1212  12/18/17   0659   WBC  9.6  5.7  7.0  2.1*   ANEU  8.6*   --    --   1.5*   ALYM  0.4*   --    --   0.3*   JANE  0.5   --    --   0.2   AEOS  0.0   --    --   0.0   HGB  15.4  15.0  15.1  13.9   HCT  44.6  43.6  44.2  41.5   PLT  134*  137*  128*  104*     Recent Labs   Lab Test  02/01/18   1503  01/26/18   1121   NA  136  141   POTASSIUM  4.5  4.0   CHLORIDE  104  107   CO2  24  26   ANIONGAP  8  8   GLC  109*  95   BUN  17  16   CR  1.26*  1.31*   COLBY  9.3  9.1          BIOPSIES:  thyroid biopsy (FNA) consistent with papillary thyroid carcinoma. Same for resected thyroid tissue. No vasc invasion. Neg margins.       CT C/A/P: 1/30/18     IMPRESSION:      1. No evidence of recurrence of lymphoproliferative disease in the  chest, abdomen, or pelvis.   2. Unchanged patchy hypodensities in the right lower quadrant  transplant kidney.  3. " Stable appearance of the transplant liver.  4. Transposition repair with right ventricular hypertrophy, unchanged.  5. Unchanged prominent anterior bladder wall, presumably not fully  distended.    ASSESSMENT AND PLAN:  A 37-year-old male with a prior history of combined liver/kidney transplant in 05/2016 who developed EBV positive monomorphic PTLD, stage III, now treated per RSST protocol with 4 weekly infusions of rituximab followed by R-COEP x4.  He is here for his followup PTLD/DLBCL.  We reviewed his interval progress.  He remains in complete remission based on his exam and his interval CT scan that showed no suspicious lymphadenopathy.  His count improved.  He is doing great.  He is on immunosuppression with tacrolimus, prednisone and now mycophenolate.  He is following with transplant team.  We do recommend to taper down mycophenolate if that is feasible from a transplant standpoint.  If not, we would advise to keep him on the lowest effective dose to potentially mitigate any future PTLD recurrence. However, he is a year out from his treatment , doing well. We will continue with surveillance.  He will come back in clinic in 6 months with labs, and he will need to repeat a CT scan in 1 year.  Multiple questions were answered.      The patient was seen and discussed with Dr. Narayanan.      Candelaria Balbuena MD  Hem-Onc Fellow    Staff Addendum  Patient has been seen, examined and evaluated by me independently. Labs, vitals and pertinent imaging studies were reviewed with the fellow MD on rounds. I agree with the above note which reflects my direct input and interpretation of progress.Exam: General: A&O, NAD HEENT: RED, MMM, no oral lesions Lymph: no palpable LAD CV: RRR, + M Pulm: CTAB Abd: S, NT, ND, no organomegaly Ext: No LE edema Labs: reviewed in EPIC A/P: AMAYA based on exam scans and labs. Repeat CT in 1 yr. Rest as above. More than 60% of a 25 min encounter was spent in counseling and care  coordination, including but not limited to discussion of interval clinical course and further plan of care as outlined above.  Girish Narayanan MD PhD  Hematology, Oncology and Transplantation  [facility]    ------------------------------------------------------------------------------------------------------------------  This note was created with the use of a speech recognition software occasionally resulting in unintended misspelling errors despite my best efforts to detect and correct those.

## 2018-02-01 NOTE — NURSING NOTE
"Oncology Rooming Note    February 1, 2018 3:28 PM   Cricket Chen is a 37 year old male who presents for:    Chief Complaint   Patient presents with     Blood Draw     Left AC butterfly  X 1, labs drawn and sent, vitals completed, checked into next appointment.     RECHECK     Post-transplant lymphoproliferative disorder      Initial Vitals: /83 (BP Location: Left arm, Patient Position: Sitting, Cuff Size: Adult Regular)  Pulse 79  Temp 98  F (36.7  C) (Oral)  Resp 18  Ht 1.778 m (5' 10\")  Wt 96.8 kg (213 lb 6.4 oz)  SpO2 97%  BMI 30.62 kg/m2 Estimated body mass index is 30.62 kg/(m^2) as calculated from the following:    Height as of this encounter: 1.778 m (5' 10\").    Weight as of this encounter: 96.8 kg (213 lb 6.4 oz). Body surface area is 2.19 meters squared.  No Pain (0) Comment: Data Unavailable   No LMP for male patient.  Allergies reviewed: Yes  Medications reviewed: Yes    Medications: Medication refills not needed today.  Pharmacy name entered into [facility]:    [facility] - [place], [place] - [phone]  [facility] - [place], [place] - [address]     Clinical concerns:  No new concerns  provider was notified.    5 minutes for nursing intake (face to face time)     Lizette Zimmer MA              "

## 2018-02-01 NOTE — TELEPHONE ENCOUNTER
BK virus PCR quantitative   Status:  Final result   Visible to patient:  Yes (MyChart) Dx:  BK viremia Order: [id number]       Notes Recorded by Jake Sidhu MD on 1/31/2018 at 10:05 AM  Stable BK viremia.  No EBV viremia.  Recommend repeating BK PCR in 2 weeks and if not decreased, would look to hold mycophenolate mofetil dose again.           Ref Range & Units 6d ago   1mo ago        BK Virus Specimen  Plasma Plasma      BK Virus Result BKNEG^BK Virus DNA Not Detected copies/mL [id number] (A) [id number] (A)      BK Virus Log <2.7 Log copies/mL 4.9 (H) 5.0 (H)CM            Please call pt: Plan remains for him to have BK labs q 3 weeks (Please obtain in two weeks from now) and we will monitor for any changes needed.

## 2018-02-01 NOTE — MR AVS SNAPSHOT
After Visit Summary   2/1/2018    Cricket Chen    MRN: [id number]           Patient Information     Date Of Birth          1980        Visit Information        Provider Department      2/1/2018 3:30 PM Girish Narayanan MD [facility] Blood and Marrow Transplant        Today's Diagnoses     PTLD (post-transplant lymphoproliferative disorder) (H)              [facility] ([facility])  [address] [place] [address]  Phone: [phone]  Clinic Hours:   Monday-Thursday:7am to 7pm   Friday: 7am to 5pm   Weekends and holidays:    8am to noon (in general)  If your fever is 100.5  or greater,   call the clinic.  After hours call the   hospital at [phone] or   [phone]. Ask for the BMT   fellow on-call            Follow-ups after your visit        Follow-up notes from your care team     Return in about 6 months (around 8/1/2018) for Physical Exam, Lab Work.      Your next 10 appointments already scheduled     Feb 09, 2018  1:50 PM CST   (Arrive by 1:35 PM)   RETURN ENDOCRINE with Zuleyma Gray MD   [facility] ([facility])    [address] [place] [address]  3rd Floor  [facility] [id number] [phone]            Feb 20, 2018 11:15 AM CST   LAB with  LAB   [facility] ([facility]    [address] [place] [id number]   [phone]           Please do not eat 10-12 hours before your appointment if you are coming in fasting for labs on lipids, cholesterol, or glucose (sugar). This does not apply to pregnant women. Water, hot tea and black coffee (with nothing added) are okay. Do not drink other fluids, diet soda or chew gum.            Aug 06, 2018  2:20 PM CDT   (Arrive by 1:50 PM)   Return Kidney Transplant with Jake Sidhu MD   [facility] Neph[facility] ([facility])    [address] [place] [address]  [address]  [facility] [id number] [phone]            George 15,  "2019 12:10 PM CST   (Arrive by 11:55 AM)   Return Liver Transplant with Laureen Galvan MD   [facility]atology ([facility])    [address]  [address]  [facility] [address] [phone]            George 15, 2019  1:05 PM CST   (Arrive by 12:35 PM)   Return Kidney Transplant with Jake Sidhu MD   [facility] Nephrology ([facility])    [address]  [address]  [facility] [address] [phone]              Who to contact     If you have questions or need follow up information about today's clinic visit or your schedule please contact [facility] B[facility]OD [facility] MARROW [facility]ANSPL[facility] directly at [phone].  Normal or non-critical lab and imaging results will be communicated to you by MyChart, letter or phone within 4 business days after the clinic has received the results. If you do not hear from us within 7 days, please contact the clinic through [employer] or phone. If you have a critical or abnormal lab result, we will notify you by phone as soon as possible.  Submit refill requests through [employer] or call your pharmacy and they will forward the refill request to us. Please allow 3 business days for your refill to be completed.          Additional Information About Your Visit        [employer] Information     [employer] gives you secure access to your electronic health record. If you see a primary care provider, you can also send messages to your care team and make appointments. If you have questions, please call your primary care clinic.  If you do not have a primary care provider, please call [phone] and they will assist you.        Care EveryWhere ID     This is your Care EveryWhere ID. This could be used by other organizations to access your [place] medical records  [id number]        Your Vitals Were     Pulse Temperature Respirations Height Pulse Oximetry BMI (Body Mass Index)    79 98  F (36.7  C) (Oral) 18 1.778 m (5' 10\") " 97% 30.62 kg/m2       Blood Pressure from Last 3 Encounters:   02/01/18 127/83   01/16/18 132/88   01/16/18 132/88    Weight from Last 3 Encounters:   02/01/18 96.8 kg (213 lb 6.4 oz)   01/16/18 96.2 kg (212 lb)   01/16/18 96.2 kg (212 lb)              We Performed the Following     CBC with platelets differential     Comprehensive metabolic panel     Lactate Dehydrogenase        Recent Review Flowsheet Data     BMT Recent Results Latest Ref Rng & Units 11/7/2017 12/12/2017 12/18/2017 1/9/2018 1/26/2018 2/1/2018 2/6/2018    WBC 4.0 - 11.0 10e9/L 5.7 4.1 2.1(L) 7.0 5.7 9.6 6.6    Hemoglobin 13.3 - 17.7 g/dL 14.3 15.3 13.9 15.1 15.0 15.4 15.2    Platelet Count 150 - 450 10e9/L 94(L) 103(L) 104(L) 128(L) 137(L) 134(L) 139(L)    Neutrophils (Absolute) 1.6 - 8.3 10e9/L 4.4 - 1.5(L) - - 8.6(H) -    INR 0.86 - 1.14 1.02 - 1.11 - - - -    Sodium 133 - 144 mmol/L - 140 139 137 141 136 139    Potassium 3.4 - 5.3 mmol/L 3.6 4.2 3.9 4.0 4.0 4.5 4.0    Chloride 94 - 109 mmol/L - 104 104 104 107 104 107    Glucose 70 - 99 mg/dL 83 95 90 97 95 109(H) 94    Urea Nitrogen 7 - 30 mg/dL - 21 20 21 16 17 17    Creatinine 0.66 - 1.25 mg/dL 1.21 1.51(H) 1.21 1.23 1.31(H) 1.26(H) 1.30(H)    Calcium (Total) 8.5 - 10.1 mg/dL - 9.2 8.7 9.3 9.1 9.3 9.2    Protein (Total) 6.8 - 8.8 g/dL - 7.5 - - - 7.9 -    Albumin 3.4 - 5.0 g/dL - 4.3 - - - 4.6 -    Bilirubin (Direct) 0.0 - 0.2 mg/dL - 0.2 - - - - -    Alkaline Phosphatase 40 - 150 U/L - 86 - - - 97 -    AST 0 - 45 U/L - 20 - - - 19 -    ALT 0 - 70 U/L - 41 - - - 30 -    MCV 78 - 100 fl 87 88 89 88 88 87 88               Primary Care Provider Fax #    Physician No Ref-Primary [phone]       No address on file        Equal Access to Services     [facility]: Ana M Barroso, jaime brito, robina rojas. [facility] [phone].    ATENCIÓN: Si habla español, tiene a laura disposición servicios gratuitos de asistencia  lingüísticaStephy Christy al [phone].    We comply with applicable federal civil rights laws and [place] laws. We do not discriminate on the basis of race, color, national origin, age, disability, sex, sexual orientation, or gender identity.            Thank you!     Thank you for choosing [facility]  for your care. Our goal is always to provide you with excellent care. Hearing back from our patients is one way we can continue to improve our services. Please take a few minutes to complete the written survey that you may receive in the mail after your visit with us. Thank you!             Your Updated Medication List - Protect others around you: Learn how to safely use, store and throw away your medicines at www.disposemymeds.org.          This list is accurate as of 2/1/18 11:59 PM.  Always use your most recent med list.                   Brand Name Dispense Instructions for use Diagnosis    amLODIPine 10 MG tablet    NORVASC    90 tablet    TAKE ONE TABLET BY MOUTH EVERY DAY    HTN (hypertension)       levothyroxine 150 MCG tablet    SYNTHROID/LEVOTHROID    90 tablet    Take 1 tablet (150 mcg) by mouth daily    Papillary thyroid carcinoma (H)       lisinopril 5 MG tablet    PRINIVIL/ZESTRIL    90 tablet    TAKE ONE TABLET BY MOUTH EVERY DAY    HTN (hypertension)       magnesium oxide 400 (241.3 MG) MG tablet    MAG-OX    120 tablet    Take 1 tablet (400 mg) by mouth 2 times daily    Hypomagnesemia       mycophenolate 250 MG capsule    GENERIC EQUIVALENT    60 capsule    Take 1 capsule (250 mg) by mouth 2 times daily    Kidney replaced by transplant       predniSONE 5 MG tablet    DELTASONE    90 tablet    Take 1 tablet (5 mg) by mouth daily    Liver replaced by transplant (H), S/P kidney transplant       sulfamethoxazole-trimethoprim 400-80 MG per tablet    BACTRIM/SEPTRA    30 tablet    Take 1 tablet by mouth daily    Liver replaced by transplant (H), S/P kidney transplant        tacrolimus 1 MG capsule    GENERIC EQUIVALENT    120 capsule    Take 2 capsules (2 mg) by mouth 2 times daily    Liver replaced by transplant (H), S/P kidney transplant       traMADol 50 MG tablet    ULTRAM    45 tablet    Take 1 tablet (50 mg) by mouth every 8 hours as needed for moderate pain    Chronic pain of left knee, Papillary thyroid carcinoma (H)

## 2018-02-02 NOTE — TELEPHONE ENCOUNTER
Left patient message and sent Mychart message to get BK drawn every 3 weeks and to stay on schedule do it again in 2 weeks.

## 2018-02-06 DIAGNOSIS — Z79.899 OTHER LONG TERM (CURRENT) DRUG THERAPY: ICD-10-CM

## 2018-02-06 DIAGNOSIS — Z94.0 KIDNEY REPLACED BY TRANSPLANT: ICD-10-CM

## 2018-02-06 DIAGNOSIS — Z94.4 LIVER REPLACED BY TRANSPLANT (H): ICD-10-CM

## 2018-02-06 DIAGNOSIS — Z94.0 KIDNEY TRANSPLANTED: ICD-10-CM

## 2018-02-06 DIAGNOSIS — Z94.4 LIVER TRANSPLANTED (H): ICD-10-CM

## 2018-02-06 LAB
ANION GAP SERPL CALCULATED.3IONS-SCNC: 6 MMOL/L (ref 3–14)
BUN SERPL-MCNC: 17 MG/DL (ref 7–30)
CALCIUM SERPL-MCNC: 9.2 MG/DL (ref 8.5–10.1)
CHLORIDE SERPL-SCNC: 107 MMOL/L (ref 94–109)
CO2 SERPL-SCNC: 26 MMOL/L (ref 20–32)
CREAT SERPL-MCNC: 1.3 MG/DL (ref 0.66–1.25)
ERYTHROCYTE [DISTWIDTH] IN BLOOD BY AUTOMATED COUNT: 14 % (ref 10–15)
GFR SERPL CREATININE-BSD FRML MDRD: 62 ML/MIN/1.7M2
GLUCOSE SERPL-MCNC: 94 MG/DL (ref 70–99)
HCT VFR BLD AUTO: 44 % (ref 40–53)
HGB BLD-MCNC: 15.2 G/DL (ref 13.3–17.7)
MCH RBC QN AUTO: 30.4 PG (ref 26.5–33)
MCHC RBC AUTO-ENTMCNC: 34.5 G/DL (ref 31.5–36.5)
MCV RBC AUTO: 88 FL (ref 78–100)
PLATELET # BLD AUTO: 139 10E9/L (ref 150–450)
POTASSIUM SERPL-SCNC: 4 MMOL/L (ref 3.4–5.3)
RBC # BLD AUTO: 5 10E12/L (ref 4.4–5.9)
SODIUM SERPL-SCNC: 139 MMOL/L (ref 133–144)
TACROLIMUS BLD-MCNC: 5.3 UG/L (ref 5–15)
TME LAST DOSE: NORMAL H
WBC # BLD AUTO: 6.6 10E9/L (ref 4–11)

## 2018-02-06 PROCEDURE — 80197 ASSAY OF TACROLIMUS: CPT | Performed by: INTERNAL MEDICINE

## 2018-02-06 PROCEDURE — 36415 COLL VENOUS BLD VENIPUNCTURE: CPT | Performed by: INTERNAL MEDICINE

## 2018-02-06 PROCEDURE — 80048 BASIC METABOLIC PNL TOTAL CA: CPT | Performed by: INTERNAL MEDICINE

## 2018-02-06 PROCEDURE — 87799 DETECT AGENT NOS DNA QUANT: CPT | Performed by: INTERNAL MEDICINE

## 2018-02-06 PROCEDURE — 85027 COMPLETE CBC AUTOMATED: CPT | Performed by: INTERNAL MEDICINE

## 2018-02-07 LAB
BKV DNA # SPEC NAA+PROBE: ABNORMAL COPIES/ML
BKV DNA SPEC NAA+PROBE-LOG#: 4.8 LOG COPIES/ML
SPECIMEN SOURCE: ABNORMAL

## 2018-02-09 ENCOUNTER — OFFICE VISIT (OUTPATIENT)
Dept: ENDOCRINOLOGY | Facility: CLINIC | Age: 38
End: 2018-02-09
Payer: COMMERCIAL

## 2018-02-09 VITALS
DIASTOLIC BLOOD PRESSURE: 80 MMHG | SYSTOLIC BLOOD PRESSURE: 122 MMHG | HEART RATE: 73 BPM | BODY MASS INDEX: 30.61 KG/M2 | OXYGEN SATURATION: 97 % | HEIGHT: 70 IN | WEIGHT: 213.8 LBS

## 2018-02-09 DIAGNOSIS — C73 PAPILLARY THYROID CARCINOMA (H): ICD-10-CM

## 2018-02-09 DIAGNOSIS — C73 PAPILLARY THYROID CARCINOMA (H): Primary | ICD-10-CM

## 2018-02-09 DIAGNOSIS — E89.0 POSTOPERATIVE HYPOTHYROIDISM: ICD-10-CM

## 2018-02-09 LAB
T4 FREE SERPL-MCNC: 1.52 NG/DL (ref 0.76–1.46)
TSH SERPL DL<=0.005 MIU/L-ACNC: 0.08 MU/L (ref 0.4–4)

## 2018-02-09 ASSESSMENT — PAIN SCALES - GENERAL: PAINLEVEL: NO PAIN (0)

## 2018-02-09 NOTE — NURSING NOTE
"Chief Complaint   Patient presents with     RECHECK     THYROIDECTOMY       Initial /80 (BP Location: Right arm, Patient Position: Sitting, Cuff Size: Adult Large)  Pulse 73  Ht 1.778 m (5' 10\")  Wt 97 kg (213 lb 12.8 oz)  SpO2 97%  BMI 30.68 kg/m2 Estimated body mass index is 30.68 kg/(m^2) as calculated from the following:    Height as of this encounter: 1.778 m (5' 10\").    Weight as of this encounter: 97 kg (213 lb 12.8 oz).  Medication Reconciliation: complete    "

## 2018-02-09 NOTE — LETTER
2/9/2018       RE: Cricket Chen  [address]  [facility] [address]     Dear Colleague,    Thank you for referring your patient, Cricket Chen, to the [facility] ENDOCRINOLOGY at [facility]. Please see a copy of my visit note below.         Endocrinology Note         Cricket is a 37 year old male presents today for follow up post total thyroidectomy    HPI  Cricket Chen is a 37 years old male with hx of post liver and kidney transplant in 5/2016, posttransplant lymphoproliferative disorder, atrial flutter, hypertension who is here for follow up post total thyroidectomy    I saw him first time in March 2017 for hypermetabolic focus in the right lobe of the thyroid with a max SUV of 12.7 on PET that was noted during baseline evaluation for diffuse large B cell lymphoma. Otherwise no suspicious FDG uptake within the head and neck. He also has PET-avid right axillary lymphadenopathy measuring 1.3 cm but no evidence of FDG-avid areas throughout the rest of his body.     At that time, US thyroid showed 1 cm mid/inferior solid right thyroid nodule which likely corresponds to the area of FDG avidity on prior PET CT. Subsequent FNA of that nodule showed positive for PTC.     Because he was on chemotherapy for PTLD treatment, we have postponed thyroid cancer treatment until he is one for cancer treatment. He ultimately went total thyroidectomy on 8/7/2017 by . His postoperative course was uncomplicated. Pathology showed PTC 0.8 cm in the right lobe with negative margin for carcinoma, 0.2 cm follicular adenoma was also identify in the right lobe. In the left lobe, 0.1 cm of PTC with negative margin and 1.2 cm benign colloid cyst were noted. No lymphovascular invasion was identified.    Interim history  He has been doing well. He is taking levothyroxine 150  g a day. He denied difficulty swallowing or breathing. He denied any muscle twitching, muscle cramping, hand  numbness or tingling. He followed up with Dr. Narayanan for PTLD which is currently in remission.    He is on prednisone 5 mg daily for post liver and kidney transplant.    Past Medical History  Past Medical History:   Diagnosis Date     Alcohol abuse     Last drink in Mid-April 2014     Anemia in ESRD (end-stage renal disease) (H)      Anxiety 2008     Atrial flutter (H)      Cirrhosis (H)     S/P liver transplant     Depression      History of blood transfusion      History of transposition of great vessels     atrial switch at age 8 months old     Hypertension      Liver transplant recipient (H)     2016     Papillary thyroid carcinoma (H)      Pneumonia 11-15-14     Renal transplant recipient     2016     Varices, esophageal (H)        Allergies  Allergies   Allergen Reactions     Hydromorphone Itching     Medications  Current Outpatient Prescriptions   Medication Sig Dispense Refill     mycophenolate (GENERIC EQUIVALENT) 250 MG capsule Take 1 capsule (250 mg) by mouth 2 times daily 60 capsule 11     lisinopril (PRINIVIL/ZESTRIL) 5 MG tablet TAKE ONE TABLET BY MOUTH EVERY DAY 90 tablet 3     amLODIPine (NORVASC) 10 MG tablet TAKE ONE TABLET BY MOUTH EVERY DAY 90 tablet 3     traMADol (ULTRAM) 50 MG tablet Take 1 tablet (50 mg) by mouth every 8 hours as needed for moderate pain 45 tablet 0     levothyroxine (SYNTHROID/LEVOTHROID) 150 MCG tablet Take 1 tablet (150 mcg) by mouth daily 90 tablet 4     magnesium oxide (MAG-OX) 400 (241.3 MG) MG tablet Take 1 tablet (400 mg) by mouth 2 times daily 120 tablet 11     tacrolimus (PROGRAF - GENERIC EQUIVALENT) 1 MG capsule Take 2 capsules (2 mg) by mouth 2 times daily 120 capsule 11     predniSONE (DELTASONE) 5 MG tablet Take 1 tablet (5 mg) by mouth daily 90 tablet 3     sulfamethoxazole-trimethoprim (BACTRIM/SEPTRA) 400-80 MG per tablet Take 1 tablet by mouth daily 30 tablet 11     Family History  family history includes Arthritis in his father; Hypertension in his  "brother, father, mother, sister, and sister. There is no history of Alcohol/Drug or GASTROINTESTINAL DISEASE.   No family hx of thyroid cancer    Social History  Social History   Substance Use Topics     Smoking status: Former Smoker     Packs/day: 0.33     Years: 3.00     Types: Cigarettes     Start date: 8/20/1997     Quit date: 9/21/2000     Smokeless tobacco: Former User     Alcohol use No      Comment: ~10 drinks per day for ten years, quit in April of 2014   quit drinking  Non , no children    ROS  Constitutional: no weight change, good energy, no night sweat  Eyes: no vision change, diplopia or red eyes   Neck: no difficulty swallowing, no choking, no neck pain, no neck swelling  Cardiovascular: no chest pain, palpitations  Respiratory: no dyspnea, cough, shortness of breath or wheezing   GI: no nausea, vomiting, diarrhea or constipation, no abdominal pain   : no change in urine, no dysuria or hematuria  Musculoskeletal: no joint or muscle pain or swelling   Integumentary: no concerning lesions  Neuro: no loss of strength or sensation, no numbness or tingling, no tremor, no dizziness, no headache   Endo: no polyuria or polydipsia, no temperature intolerance   Heme/Lymph: no concerning bumps, no bleeding problems   Allergy: no environmental allergies   Psych: no depression or anxiety     Physical Exam  /80 (BP Location: Right arm, Patient Position: Sitting, Cuff Size: Adult Large)  Pulse 73  Ht 1.778 m (5' 10\")  Wt 97 kg (213 lb 12.8 oz)  SpO2 97%  BMI 30.68 kg/m2  Body mass index is 30.68 kg/(m^2).  Constitutional: no distress, comfortable, pleasant   Eyes: anicteric, normal extra-ocular movements, no lid lag or retraction  Neck: well healed surgical scar at the upper part of the right neck and lower neck, no discrete nodule  Cardiovascular: regular rate and rhythm, normal S1 and S2, no murmurs  Respiratory: clear to auscultation, no wheezes or crackles, normal breath sounds "   Gastrointestinal:  Surgical scar along right and left costal area, nontender, no hepatomegaly, no masses   Musculoskeletal: no edema   Skin: no jaundice   Neurological: cranial nerves intact, 2+ reflexes at patella , normal gait, no tremor on outstretched hands bilaterally  Psychological: appropriate mood   Lymphatic: no cervical  lymphadenopathy.    RESULTS  PET 2/4/2017  HEAD/NECK:  Hypermetabolic focus in the right lobe of the thyroid with a max SUV of 12.7. Otherwise no suspicious FDG uptake within the head and neck. There is a 3.0 x 2.2 cm postoperative collection in the right lateral  neck anterior to the sternocleidal mastoid muscle, lateral to the right submandibular gland.   No abnormality identified within the mucosal spaces of the neck. Tongue base is normal. No lymphadenopathy within the neck. Limited head CT is within normal limits.    IMPRESSION:   1. Hypermetabolic right axillary lymph node, suspicious for involvement by lymphoproliferative disease.  2. Hypermetabolic lesion in the right thyroid with a max SUV of 12.7. No definite CT correlate identified. Ultrasound of the thyroid is recommended.  3. Indeterminant 2.2 cm area of decreased enhancement within the right lower quadrant transplant kidney. It does not appear to be  masslike. Ultrasound is recommended for further characterization   4. Postsurgical changes of liver and right lower quadrant kidney transplantation.  5. Transposition of great vessels with postsurgical changes of atrial baffle procedure resulting in a large, presumably intentional atrial septal defect.     US thyroid 3/2/2017  Thyroid parenchyma: Homogeneous  The right lobe of the thyroid measures: 1.6 x 1.6 x 4.5 cm   The thyroid isthmus measures: 0.2 cm   The left lobe of the thyroid measures: 1.6 x 1.1 x 4 cm      Right lobe:  Nodule 1:  Nodule measurement: 0.3 x 0.3 x 0.3 cm  Echogenicity: Predominantly isoechoic  Consistency: Mixed cystic and solid  Calcifications:  no  Hypervascular: Minimal peripheral vascularity  Interval growth (>20%): No prior imaging available     Nodule 2:  Nodule measurement: 0.9 x 0.8 x 0.8 cm  Echogenicity: Hypoechoic  Consistency: solid  Calcifications: no  Hypervascular: yes  Interval growth (>20%): No prior imaging available     Isthmus: No nodule     Left Lobe:   Nodule 1:  Nodule measurement: 0.7 x 0.5 x 0.8 cm  Echogenicity: Hypoechoic  Consistency: cystic  Calcifications: no; nondependent echogenic focus with ringdown  artifact most compatible with inspissated colloid.  Hypervascular: no  Interval growth (>20%): No prior imaging available     Impression:  1.  Almost 1 cm mid/inferior solid right thyroid nodule which likely corresponds to the area of FDG avidity on prior PET CT. Consider FNA for further evaluation.  2.  Additional subcentimeter right thyroid nodule and left thyroid colloid cyst are noted    FNA right thyroid nodule 3/16/2017  Thyroid, right #2, ultrasound-guided fine needle aspiration:   Positive for malignancy.   Papillary thyroid carcinoma   Specimen Adequacy: Satisfactory for evaluation.    Surgical pathology 8/7/17  FINAL DIAGNOSIS:   A- Thyroid, right, lobectomy:   - Papillary thyroid microcarcinoma: 0.8 cm in greatest dimension   - Margins negative for carcinoma   - Perineural and vascular invasion not identified.   - Follicular adenoma, 0.2 cm in greatest dimension   - See tumor synopsis for details     B- Thyroid, left, lobectomy:   - Papillary thyroid microcarcinoma: 0.1 cm in greatest dimension   - Margins negative for carcinoma   - Perineural and vascular invasion not identified.   - Benign colloid cyst: 1.2 cm in greatest dimension   - See tumor synopsis for details     Report Name: Thyroid Gland - Resection   Status: Submitted     Part(s) Involved:   A: Thyroid, right     Synoptic Report:     SPECIMEN     Procedure:         - Total thyroidectomy     TUMOR     Histologic Type:         - Papillary carcinoma        Common Significant Variants:           - Classical (usual, conventional)     Tumor Size: 0.8 cm     Tumor Laterality:         - Right lobe         - Left lobe     Tumor Focality:         - Multifocal     Tumor Extent       Extrathyroidal Extension:           - Not identified     Accessory Tumor Findings       Angioinvasion (vascular invasion):           - Not identified       Lymphatic Invasion:           - Not identified       Perineural Invasion:           - Not identified     MARGINS     Margins:         - Margins uninvolved by carcinoma     LYMPH NODES     Regional Lymph Nodes:         - No nodes submitted or found     STAGE (PTNM)     TNM Descriptors:         - m (multiple primary tumors)     Primary Tumor (pT):         - pT1a:  Tumor 1 cm or less in greatest dimension limited to the   thyroid.     Regional Lymph Nodes (pN):         - pNX: Regional lymph nodes cannot be assessed     ADDITIONAL FINDINGS     Additional Pathologic Findings:         - Adenoma     9/27/2017: TSH 0.13, FT4 1.17, Tg 0.23, TgAb<0.4    ASSESSMENT:    Cricket Chen is a 37 years old male with hx of post liver and kidney transplant in 5/2016, lymphoproliferative disorder, atrial flutter, hypertension who is here for follow up PTC s/p total thyroidectomy    1) multifocal micro PTC: he was noted to have thyroid cancer during evaluation of diffuse large B cell lymphoma. He was initially noted for hypermetabolic activity at right thyroid gland with SUV max of 12.7 from PET scan. Subsequent FNA of right thyroid nodule showed positive for thyroid cancer. He underwent total thyroidectomy without complications.  His prognosis is favorable given micro PTC, negative margin and no lymphovascular invasion.  I will monitor thyroglobulin for now.  We will plan to check lab, TSH, FT4, Tg, TgAb, today    2) postoperative hypothyroidism: clinically euthyroid. Will check TFT today.    PLAN:   - lab for TSH, FT4, Tg, TgAb today  - RTC 6 months with lab  and ultrasound neck    Addendum  ENDO THYROID LABS-[facility] Latest Ref Rng & Units 2/9/2018   TSH 0.40 - 4.00 mU/L 0.08 (L)   T4 FREE 0.76 - 1.46 ng/dL 1.52 (H)   Will reduce levothyroxine to 137 mcg daily and repeat lab in 2 months    Zuleyma Gray MD     Division of Diabetes and Endocrinology  Department of Medicine  [phone]

## 2018-02-09 NOTE — MR AVS SNAPSHOT
After Visit Summary   2/9/2018    Cricket Chen    MRN: [id number]           Patient Information     Date Of Birth          1980        Visit Information        Provider Department      2/9/2018 1:50 PM Zuleyma Gray MD [facility] Endocrinology        Today's Diagnoses     Papillary thyroid carcinoma (H)    -  1      Care Instructions    - please get lab today   - I will see you again in about 6 months      If you have any questions, please do not hesitate to call [phone] and ask for Endocrinology clinic.      After clinic hours, please contact [phone] and ask for Endocrinologist-on call    Sincerely,    Zuleyma Gray MD  Endocrinology            Follow-ups after your visit        Your next 10 appointments already scheduled     Feb 09, 2018  2:30 PM CST   LAB with  LAB   [facility] ([facility])    [address] [place] [address] [phone]           Please do not eat 10-12 hours before your appointment if you are coming in fasting for labs on lipids, cholesterol, or glucose (sugar). This does not apply to pregnant women. Water, hot tea and black coffee (with nothing added) are okay. Do not drink other fluids, diet soda or chew gum.            Feb 20, 2018 11:15 AM CST   LAB with [facility] ([facility])    [address] [place] [id number] [phone]           Please do not eat 10-12 hours before your appointment if you are coming in fasting for labs on lipids, cholesterol, or glucose (sugar). This does not apply to pregnant women. Water, hot tea and black coffee (with nothing added) are okay. Do not drink other fluids, diet soda or chew gum.            Aug 06, 2018  2:20 PM CDT   (Arrive by 1:50 PM)   Return Kidney Transplant with Jake Sidhu MD   [facility] Nephrology ([facility])    [address] [place] [address]  [address]  [facility] [address]   [phone]            Aug 10, 2018  1:50 PM CDT   (Arrive by 1:35 PM)   RETURN ENDOCRINE with Zuleyma Gray MD   [facility] Endocrinology ([facility])    [address]  3rd Floor  [facility] [id number] [phone]            George 15, 2019 12:10 PM CST   (Arrive by 11:55 AM)   Return Liver Transplant with Laureen Galvan MD   [facility] Hepatology ([facility])    [address]  [address]  [facility] [address]   [phone]            George 15, 2019  1:05 PM CST   (Arrive by 12:35 PM)   Return Kidney Transplant with Jake Sidhu MD   [facility] Nephrology ([facility])    [address]  [address]  [facility] [id number] [phone]              Future tests that were ordered for you today     Open Future Orders        Priority Expected Expires Ordered    TSH Routine 2/9/2018 2/9/2019 2/9/2018    T4 free Routine 2/9/2018 2/9/2019 2/9/2018    Thyroglobulin (Total, antibody & recovery %) Routine 2/9/2018 2/9/2019 2/9/2018            Who to contact     Please call your clinic at [phone] to:    Ask questions about your health    Make or cancel appointments    Discuss your medicines    Learn about your test results    Speak to your doctor            Additional Information About Your Visit        Top[employer]est Information     [employer] gives you secure access to your electronic health record. If you see a primary care provider, you can also send messages to your care team and make appointments. If you have questions, please call your primary care clinic.  If you do not have a primary care provider, please call [phone] and they will assist you.      [employer] is an electronic gateway that provides easy, online access to your medical records. With [employer], you can request a clinic appointment, read your test results, renew a prescription or communicate with  "your care team.     To access your existing account, please contact your [facility] or call [phone] for assistance.        Care EveryWhere ID     This is your Care EveryWhere ID. This could be used by other organizations to access your [place] medical records  [id number]        Your Vitals Were     Pulse Height Pulse Oximetry BMI (Body Mass Index)          73 1.778 m (5' 10\") 97% 30.68 kg/m2         Blood Pressure from Last 3 Encounters:   02/09/18 122/80   02/01/18 127/83   01/16/18 132/88    Weight from Last 3 Encounters:   02/09/18 97 kg (213 lb 12.8 oz)   02/01/18 96.8 kg (213 lb 6.4 oz)   01/16/18 96.2 kg (212 lb)               Primary Care Provider Fax #    Physician No Ref-Primary [phone]       No address on file        Equal Access to Services     [facility]: Hadii steffany Barroso, waaxda luqadaha, qaybta kaalmada kostasyacindi, robina white . So [facility] [phone].    ATENCIÓN: Si habla español, tiene a laura disposición servicios gratuitos de asistencia lingüística. Jaelyn al [phone].    We comply with applicable federal civil rights laws and [place] laws. We do not discriminate on the basis of race, color, national origin, age, disability, sex, sexual orientation, or gender identity.            Thank you!     Thank you for choosing [facility] ENDOCRINOLOGY  for your care. Our goal is always to provide you with excellent care. Hearing back from our patients is one way we can continue to improve our services. Please take a few minutes to complete the written survey that you may receive in the mail after your visit with us. Thank you!             Your Updated Medication List - Protect others around you: Learn how to safely use, store and throw away your medicines at www.disposemymeds.org.          This list is accurate as of 2/9/18  2:12 PM.  Always use your most recent med list.                   Brand Name Dispense Instructions " for use Diagnosis    amLODIPine 10 MG tablet    NORVASC    90 tablet    TAKE ONE TABLET BY MOUTH EVERY DAY    HTN (hypertension)       levothyroxine 150 MCG tablet    SYNTHROID/LEVOTHROID    90 tablet    Take 1 tablet (150 mcg) by mouth daily    Papillary thyroid carcinoma (H)       lisinopril 5 MG tablet    PRINIVIL/ZESTRIL    90 tablet    TAKE ONE TABLET BY MOUTH EVERY DAY    HTN (hypertension)       magnesium oxide 400 (241.3 MG) MG tablet    MAG-OX    120 tablet    Take 1 tablet (400 mg) by mouth 2 times daily    Hypomagnesemia       mycophenolate 250 MG capsule    GENERIC EQUIVALENT    60 capsule    Take 1 capsule (250 mg) by mouth 2 times daily    Kidney replaced by transplant       predniSONE 5 MG tablet    DELTASONE    90 tablet    Take 1 tablet (5 mg) by mouth daily    Liver replaced by transplant (H), S/P kidney transplant       sulfamethoxazole-trimethoprim 400-80 MG per tablet    BACTRIM/SEPTRA    30 tablet    Take 1 tablet by mouth daily    Liver replaced by transplant (H), S/P kidney transplant       tacrolimus 1 MG capsule    GENERIC EQUIVALENT    120 capsule    Take 2 capsules (2 mg) by mouth 2 times daily    Liver replaced by transplant (H), S/P kidney transplant       traMADol 50 MG tablet    ULTRAM    45 tablet    Take 1 tablet (50 mg) by mouth every 8 hours as needed for moderate pain    Chronic pain of left knee, Papillary thyroid carcinoma (H)

## 2018-02-09 NOTE — PATIENT INSTRUCTIONS
- please get lab today   - I will see you again in about 6 months      If you have any questions, please do not hesitate to call [phone] and ask for Endocrinology clinic.      After clinic hours, please contact [phone] and ask for Endocrinologist-on call    Sincerely,    Zuleyma Gray MD  Endocrinology

## 2018-02-11 RX ORDER — LEVOTHYROXINE SODIUM 137 UG/1
137 TABLET ORAL DAILY
Qty: 90 TABLET | Refills: 3 | Status: SHIPPED | OUTPATIENT
Start: 2018-02-11 | End: 2018-03-06

## 2018-02-12 DIAGNOSIS — Z94.0 KIDNEY REPLACED BY TRANSPLANT: Primary | ICD-10-CM

## 2018-02-12 DIAGNOSIS — Z94.4 LIVER REPLACED BY TRANSPLANT (H): ICD-10-CM

## 2018-02-12 DIAGNOSIS — Z94.0 S/P KIDNEY TRANSPLANT: ICD-10-CM

## 2018-02-12 RX ORDER — TACROLIMUS 1 MG/1
1 CAPSULE ORAL 2 TIMES DAILY
Qty: 60 CAPSULE | Refills: 5 | Status: SHIPPED | OUTPATIENT
Start: 2018-02-12 | End: 2018-08-24

## 2018-02-12 RX ORDER — TACROLIMUS 0.5 MG/1
0.5 CAPSULE ORAL 2 TIMES DAILY
Qty: 60 CAPSULE | Refills: 5 | Status: SHIPPED | OUTPATIENT
Start: 2018-02-12 | End: 2018-08-13

## 2018-02-12 NOTE — TELEPHONE ENCOUNTER
Patient confirmed 12 hour prograf level, will decrease prograf from 2 mg twice per day to 1.5 mg twice per day. Will repeat 1-2 weeks and will take tacrolimus 2/1 until the 0.5 mg caps arrive.

## 2018-02-12 NOTE — TELEPHONE ENCOUNTER
Tacrolimus 5.3, goal 3-5  Please call pt to verify this was a good trough level. Confirm dose 2 mg BID. Decrease tacrolimus to 1.5 mg BID. Repeat level in 1-2 weeks.

## 2018-02-18 LAB — LAB SCANNED RESULT: NORMAL

## 2018-02-20 DIAGNOSIS — Z94.0 KIDNEY TRANSPLANTED: ICD-10-CM

## 2018-02-20 LAB
ANION GAP SERPL CALCULATED.3IONS-SCNC: 8 MMOL/L (ref 3–14)
BUN SERPL-MCNC: 16 MG/DL (ref 7–30)
CALCIUM SERPL-MCNC: 9.4 MG/DL (ref 8.5–10.1)
CHLORIDE SERPL-SCNC: 105 MMOL/L (ref 94–109)
CO2 SERPL-SCNC: 28 MMOL/L (ref 20–32)
CREAT SERPL-MCNC: 1.33 MG/DL (ref 0.66–1.25)
ERYTHROCYTE [DISTWIDTH] IN BLOOD BY AUTOMATED COUNT: 14.1 % (ref 10–15)
GFR SERPL CREATININE-BSD FRML MDRD: 60 ML/MIN/1.7M2
GLUCOSE SERPL-MCNC: 90 MG/DL (ref 70–99)
HCT VFR BLD AUTO: 46.2 % (ref 40–53)
HGB BLD-MCNC: 15.7 G/DL (ref 13.3–17.7)
MCH RBC QN AUTO: 30.3 PG (ref 26.5–33)
MCHC RBC AUTO-ENTMCNC: 34 G/DL (ref 31.5–36.5)
MCV RBC AUTO: 89 FL (ref 78–100)
PLATELET # BLD AUTO: 139 10E9/L (ref 150–450)
POTASSIUM SERPL-SCNC: 4.5 MMOL/L (ref 3.4–5.3)
RBC # BLD AUTO: 5.18 10E12/L (ref 4.4–5.9)
SODIUM SERPL-SCNC: 141 MMOL/L (ref 133–144)
TACROLIMUS BLD-MCNC: 4.3 UG/L (ref 5–15)
TME LAST DOSE: 2300 H
WBC # BLD AUTO: 5.1 10E9/L (ref 4–11)

## 2018-02-20 PROCEDURE — 80197 ASSAY OF TACROLIMUS: CPT | Performed by: INTERNAL MEDICINE

## 2018-02-20 PROCEDURE — 80048 BASIC METABOLIC PNL TOTAL CA: CPT | Performed by: INTERNAL MEDICINE

## 2018-02-20 PROCEDURE — 85027 COMPLETE CBC AUTOMATED: CPT | Performed by: INTERNAL MEDICINE

## 2018-02-20 PROCEDURE — 36415 COLL VENOUS BLD VENIPUNCTURE: CPT | Performed by: INTERNAL MEDICINE

## 2018-03-01 DIAGNOSIS — Z94.0 S/P KIDNEY TRANSPLANT: ICD-10-CM

## 2018-03-01 DIAGNOSIS — Z94.4 LIVER REPLACED BY TRANSPLANT (H): ICD-10-CM

## 2018-03-01 RX ORDER — SULFAMETHOXAZOLE AND TRIMETHOPRIM 400; 80 MG/1; MG/1
1 TABLET ORAL DAILY
Qty: 30 TABLET | Refills: 11 | Status: SHIPPED | OUTPATIENT
Start: 2018-03-01 | End: 2019-02-25

## 2018-03-06 ENCOUNTER — HOSPITAL ENCOUNTER (OUTPATIENT)
Facility: CLINIC | Age: 38
Setting detail: OBSERVATION
Discharge: HOME | End: 2018-03-07
Attending: EMERGENCY MEDICINE | Admitting: INTERNAL MEDICINE
Payer: COMMERCIAL

## 2018-03-06 ENCOUNTER — APPOINTMENT (OUTPATIENT)
Dept: GENERAL RADIOLOGY | Facility: CLINIC | Age: 38
End: 2018-03-06
Attending: EMERGENCY MEDICINE
Payer: COMMERCIAL

## 2018-03-06 DIAGNOSIS — I48.92 ATRIAL FLUTTER, UNSPECIFIED TYPE (H): ICD-10-CM

## 2018-03-06 LAB
ALBUMIN SERPL-MCNC: 3.9 G/DL (ref 3.4–5)
ALP SERPL-CCNC: 84 U/L (ref 40–150)
ALT SERPL W P-5'-P-CCNC: 24 U/L (ref 0–70)
ANION GAP SERPL CALCULATED.3IONS-SCNC: 8 MMOL/L (ref 3–14)
APTT PPP: 35 SEC (ref 22–37)
AST SERPL W P-5'-P-CCNC: 18 U/L (ref 0–45)
BASOPHILS # BLD AUTO: 0 10E9/L (ref 0–0.2)
BASOPHILS NFR BLD AUTO: 0.6 %
BILIRUB SERPL-MCNC: 0.7 MG/DL (ref 0.2–1.3)
BUN SERPL-MCNC: 18 MG/DL (ref 7–30)
CALCIUM SERPL-MCNC: 9.2 MG/DL (ref 8.5–10.1)
CHLORIDE SERPL-SCNC: 107 MMOL/L (ref 94–109)
CO2 SERPL-SCNC: 26 MMOL/L (ref 20–32)
CREAT SERPL-MCNC: 1.36 MG/DL (ref 0.66–1.25)
D DIMER PPP FEU-MCNC: 0.5 UG/ML FEU (ref 0–0.5)
DIFFERENTIAL METHOD BLD: ABNORMAL
EOSINOPHIL # BLD AUTO: 0 10E9/L (ref 0–0.7)
EOSINOPHIL NFR BLD AUTO: 0.9 %
ERYTHROCYTE [DISTWIDTH] IN BLOOD BY AUTOMATED COUNT: 13.5 % (ref 10–15)
GFR SERPL CREATININE-BSD FRML MDRD: 59 ML/MIN/1.7M2
GLUCOSE SERPL-MCNC: 100 MG/DL (ref 70–99)
HCT VFR BLD AUTO: 44.7 % (ref 40–53)
HGB BLD-MCNC: 15.3 G/DL (ref 13.3–17.7)
IMM GRANULOCYTES # BLD: 0 10E9/L (ref 0–0.4)
IMM GRANULOCYTES NFR BLD: 0.6 %
INR PPP: 1.18 (ref 0.86–1.14)
INTERPRETATION ECG - MUSE: NORMAL
LACTATE BLD-SCNC: 0.8 MMOL/L (ref 0.4–1.9)
LIPASE SERPL-CCNC: 95 U/L (ref 73–393)
LYMPHOCYTES # BLD AUTO: 0.4 10E9/L (ref 0.8–5.3)
LYMPHOCYTES NFR BLD AUTO: 11.9 %
MAGNESIUM SERPL-MCNC: 2.2 MG/DL (ref 1.6–2.3)
MCH RBC QN AUTO: 30.7 PG (ref 26.5–33)
MCHC RBC AUTO-ENTMCNC: 34.2 G/DL (ref 31.5–36.5)
MCV RBC AUTO: 90 FL (ref 78–100)
MONOCYTES # BLD AUTO: 0.4 10E9/L (ref 0–1.3)
MONOCYTES NFR BLD AUTO: 11.9 %
NEUTROPHILS # BLD AUTO: 2.6 10E9/L (ref 1.6–8.3)
NEUTROPHILS NFR BLD AUTO: 74.1 %
NRBC # BLD AUTO: 0 10*3/UL
NRBC BLD AUTO-RTO: 0 /100
PLATELET # BLD AUTO: 126 10E9/L (ref 150–450)
POTASSIUM SERPL-SCNC: 3.8 MMOL/L (ref 3.4–5.3)
PROT SERPL-MCNC: 6.8 G/DL (ref 6.8–8.8)
RADIOLOGIST FLAGS: NORMAL
RBC # BLD AUTO: 4.98 10E12/L (ref 4.4–5.9)
SODIUM SERPL-SCNC: 140 MMOL/L (ref 133–144)
T4 FREE SERPL-MCNC: 1.87 NG/DL (ref 0.76–1.46)
TROPONIN I SERPL-MCNC: <0.015 UG/L (ref 0–0.04)
TSH SERPL DL<=0.005 MIU/L-ACNC: 0.12 MU/L (ref 0.4–4)
WBC # BLD AUTO: 3.5 10E9/L (ref 4–11)

## 2018-03-06 PROCEDURE — 84443 ASSAY THYROID STIM HORMONE: CPT | Performed by: EMERGENCY MEDICINE

## 2018-03-06 PROCEDURE — 25000131 ZZH RX MED GY IP 250 OP 636 PS 637: Performed by: INTERNAL MEDICINE

## 2018-03-06 PROCEDURE — 36592 COLLECT BLOOD FROM PICC: CPT | Performed by: EMERGENCY MEDICINE

## 2018-03-06 PROCEDURE — 96361 HYDRATE IV INFUSION ADD-ON: CPT

## 2018-03-06 PROCEDURE — 83735 ASSAY OF MAGNESIUM: CPT | Performed by: EMERGENCY MEDICINE

## 2018-03-06 PROCEDURE — 84439 ASSAY OF FREE THYROXINE: CPT | Performed by: EMERGENCY MEDICINE

## 2018-03-06 PROCEDURE — 80053 COMPREHEN METABOLIC PANEL: CPT | Performed by: EMERGENCY MEDICINE

## 2018-03-06 PROCEDURE — 99220 ZZC INITIAL OBSERVATION CARE,LEVL III: CPT | Performed by: INTERNAL MEDICINE

## 2018-03-06 PROCEDURE — 99285 EMERGENCY DEPT VISIT HI MDM: CPT | Mod: 25

## 2018-03-06 PROCEDURE — 83690 ASSAY OF LIPASE: CPT | Performed by: EMERGENCY MEDICINE

## 2018-03-06 PROCEDURE — 71046 X-RAY EXAM CHEST 2 VIEWS: CPT

## 2018-03-06 PROCEDURE — 99284 EMERGENCY DEPT VISIT MOD MDM: CPT | Mod: 25 | Performed by: EMERGENCY MEDICINE

## 2018-03-06 PROCEDURE — 93010 ELECTROCARDIOGRAM REPORT: CPT | Mod: Z6 | Performed by: EMERGENCY MEDICINE

## 2018-03-06 PROCEDURE — 25000128 H RX IP 250 OP 636: Performed by: EMERGENCY MEDICINE

## 2018-03-06 PROCEDURE — 25000132 ZZH RX MED GY IP 250 OP 250 PS 637: Performed by: INTERNAL MEDICINE

## 2018-03-06 PROCEDURE — 85025 COMPLETE CBC W/AUTO DIFF WBC: CPT | Performed by: EMERGENCY MEDICINE

## 2018-03-06 PROCEDURE — 85730 THROMBOPLASTIN TIME PARTIAL: CPT | Performed by: EMERGENCY MEDICINE

## 2018-03-06 PROCEDURE — 85610 PROTHROMBIN TIME: CPT | Performed by: EMERGENCY MEDICINE

## 2018-03-06 PROCEDURE — 93005 ELECTROCARDIOGRAM TRACING: CPT

## 2018-03-06 PROCEDURE — 25000131 ZZH RX MED GY IP 250 OP 636 PS 637

## 2018-03-06 PROCEDURE — 84484 ASSAY OF TROPONIN QUANT: CPT | Performed by: EMERGENCY MEDICINE

## 2018-03-06 PROCEDURE — 85379 FIBRIN DEGRADATION QUANT: CPT | Performed by: EMERGENCY MEDICINE

## 2018-03-06 PROCEDURE — 96360 HYDRATION IV INFUSION INIT: CPT

## 2018-03-06 PROCEDURE — 83605 ASSAY OF LACTIC ACID: CPT | Performed by: EMERGENCY MEDICINE

## 2018-03-06 PROCEDURE — 21400006 ZZH R&B CCU INTERMEDIATE UMMC

## 2018-03-06 RX ORDER — SULFAMETHOXAZOLE AND TRIMETHOPRIM 400; 80 MG/1; MG/1
1 TABLET ORAL DAILY
Status: DISCONTINUED | OUTPATIENT
Start: 2018-03-07 | End: 2018-03-07 | Stop reason: HOSPADM

## 2018-03-06 RX ORDER — PREDNISONE 5 MG/1
5 TABLET ORAL DAILY
Status: DISCONTINUED | OUTPATIENT
Start: 2018-03-07 | End: 2018-03-07 | Stop reason: HOSPADM

## 2018-03-06 RX ORDER — LIDOCAINE 40 MG/G
CREAM TOPICAL
Status: DISCONTINUED | OUTPATIENT
Start: 2018-03-06 | End: 2018-03-07 | Stop reason: HOSPADM

## 2018-03-06 RX ORDER — LEVOTHYROXINE SODIUM 150 UG/1
150 TABLET ORAL DAILY
Status: DISCONTINUED | OUTPATIENT
Start: 2018-03-07 | End: 2018-03-07 | Stop reason: HOSPADM

## 2018-03-06 RX ORDER — ACETAMINOPHEN 325 MG/1
650 TABLET ORAL EVERY 4 HOURS PRN
Status: DISCONTINUED | OUTPATIENT
Start: 2018-03-06 | End: 2018-03-07 | Stop reason: HOSPADM

## 2018-03-06 RX ORDER — AMLODIPINE BESYLATE 10 MG/1
10 TABLET ORAL DAILY
Status: DISCONTINUED | OUTPATIENT
Start: 2018-03-07 | End: 2018-03-07 | Stop reason: HOSPADM

## 2018-03-06 RX ORDER — TACROLIMUS 0.5 MG/1
0.5 CAPSULE ORAL 2 TIMES DAILY
Status: DISCONTINUED | OUTPATIENT
Start: 2018-03-06 | End: 2018-03-06

## 2018-03-06 RX ORDER — TACROLIMUS 1 MG/1
1 CAPSULE ORAL 2 TIMES DAILY
Status: DISCONTINUED | OUTPATIENT
Start: 2018-03-06 | End: 2018-03-06

## 2018-03-06 RX ORDER — LISINOPRIL 5 MG/1
5 TABLET ORAL DAILY
Status: DISCONTINUED | OUTPATIENT
Start: 2018-03-07 | End: 2018-03-07 | Stop reason: HOSPADM

## 2018-03-06 RX ORDER — MYCOPHENOLATE MOFETIL 250 MG/1
250 CAPSULE ORAL 2 TIMES DAILY
Status: DISCONTINUED | OUTPATIENT
Start: 2018-03-06 | End: 2018-03-07 | Stop reason: HOSPADM

## 2018-03-06 RX ADMIN — SODIUM CHLORIDE 1000 ML: 9 INJECTION, SOLUTION INTRAVENOUS at 14:30

## 2018-03-06 RX ADMIN — TACROLIMUS 1.5 MG: 1 CAPSULE ORAL at 20:52

## 2018-03-06 RX ADMIN — MYCOPHENOLATE MOFETIL 250 MG: 250 CAPSULE ORAL at 20:52

## 2018-03-06 RX ADMIN — APIXABAN 5 MG: 5 TABLET, FILM COATED ORAL at 20:52

## 2018-03-06 NOTE — ED NOTES
Pt presents with c/o chest tightness and SOB that began last evening. He states he has hx of panic attacks but says he has not had one in about 10 years. This feels similar. Denies recent changes or stresses in his life. Denies chest pain.

## 2018-03-06 NOTE — IP AVS SNAPSHOT
MRN:[id number]                      After Visit Summary   3/6/2018    Cricket Chen    MRN: [id number]           Thank you!     Thank you for choosing [facility] for your care. Our goal is always to provide you with excellent care. Hearing back from our patients is one way we can continue to improve our services. Please take a few minutes to complete the written survey that you may receive in the mail after you visit with us. Thank you!        Patient Information     Date Of Birth          1980        Designated Caregiver       Most Recent Value    Caregiver    Will someone help with your care after discharge? yes    Name of designated caregiver Linsey Chen    Phone number of caregiver [phone]    Caregiver address [address], [facility] [address]      About your hospital stay     You were admitted on:  March 6, 2018 You last received care in the:  [address] [facility]    You were discharged on:  March 7, 2018        Reason for your hospital stay       You were admitted for atrial flutter, underwent a NEELIMA with cardioversion and rhythm returned to sinus rhythm.                  Who to Call     For medical emergencies, please call 911.  For non-urgent questions about your medical care, please call your primary care provider or clinic, None  For questions related to your surgery, please call your surgery clinic        Attending Provider     Provider Specialty    Isabella Cruz MD Emergency Medicine    Jaylen George MD Clinical Cardiac Electrophysiology       Primary Care Provider    [facility]      After Care Instructions     Activity       Your activity upon discharge: activity as tolerated            Diet       Follow this diet upon discharge: Orders Placed This Encounter      NPO for Medical/Clinical Reasons Except for: Meds                  Follow-up Appointments     Adult [facility] Follow-up and recommended labs and tests       Follow up with Dr. Alba  (Congenital cardiology) 2-4 weeks, at [facility]  for hospital follow- up and to transfer care to [facility]. No follow up labs or test are needed.    Appointments on [place] and/or [facility] (with [facility] or [facility] provider or service). Call [phone] if you haven't heard regarding these appointments within 7 days of discharge.                  Your next 10 appointments already scheduled     Mar 09, 2018 11:15 AM CST   LAB with  LAB   [facility]    [address] [id number] [phone]           Please do not eat 10-12 hours before your appointment if you are coming in fasting for labs on lipids, cholesterol, or glucose (sugar). This does not apply to pregnant women. Water, hot tea and black coffee (with nothing added) are okay. Do not drink other fluids, diet soda or chew gum.            Aug 02, 2018  3:00 PM CDT   [facility] Draw with  [facility] LAB DRAW   [facility] Lab Draw ([facility])    [address]  [address]  [facility] [address] [phone]            Aug 02, 2018  3:30 PM CDT   RETURN ONC with Girish Narayanan MD   [facility] Blood and Marrow Transplant ([facility])    [address]  [address]  [facility] [id number] [phone]            Aug 06, 2018  2:20 PM CDT   (Arrive by 1:50 PM)   Return Kidney Transplant with Jake Sidhu MD   [facility] Nephrology ([facility])    [address]  [address]  [facility] [address] [phone]            Aug 10, 2018  1:50 PM CDT   (Arrive by 1:35 PM)   RETURN ENDOCRINE with Zuleyma Gray MD   [facility] Endocrinology ([facility])    [address] [place] [address]  3rd Floor  [facility] [address] [phone]            George 15, 2019 12:10 PM CST   (Arrive by 11:55 AM)   Return Liver Transplant with Laureen Galvan MD   [facility]  "Hepatology ([facility])    [address]  [address]  [facility] [address] [phone]            Geore[place] 15, 2019  1:05 PM CST   (Arrive by 12:35 PM)   Return Kidney Transplant with Jake Sidhu MD   [facility] Nephrology ([facility])    [address]  [address]  [facility] [id number] [phone]              Pending Results     Date and Time Order Name Status Description    3/7/2018 1011 EKG 12-lead, tracing only Preliminary     3/7/2018 0812 Cardioversion In process             Statement of Approval     Ordered          03/07/18 1221  I have reviewed and agree with all the recommendations and orders detailed in this document.  EFFECTIVE NOW     Approved and electronically signed by:  Jaylen George MD             Admission Information     Date & Time Provider Department Dept. Phone    3/6/2018 Jaylen George MD [address] [facility] [phone]      Your Vitals Were     Blood Pressure Pulse Temperature Respirations Height Weight    106/73 (BP Location: Left arm) 73 97.5  F (36.4  C) (Oral) 16 1.778 m (5' 10\") 93.1 kg (205 lb 4.8 oz)    Pulse Oximetry BMI (Body Mass Index)                95% 29.46 kg/m2          [employer] Information     [employer] gives you secure access to your electronic health record. If you see a primary care provider, you can also send messages to your care team and make appointments. If you have questions, please call your primary care clinic.  If you do not have a primary care provider, please call [phone] and they will assist you.        Care EveryWhere ID     This is your Care EveryWhere ID. This could be used by other organizations to access your [place] medical records  [id number]        Equal Access to Services     [facility]: Ana M Barroso, jaime brito, robina rojas. So [facility] [phone].    ATENCIÓN: Si lulu espfernando, " tiene a laura disposición servicios gratuitos de asistencia lingüística. Jaelyn puente [phone].    We comply with applicable federal civil rights laws and [place] laws. We do not discriminate on the basis of race, color, national origin, age, disability, sex, sexual orientation, or gender identity.               Review of your medicines      START taking        Dose / Directions    apixaban ANTICOAGULANT 5 MG tablet   Commonly known as:  ELIQUIS   Used for:  Atrial flutter, unspecified type (H)        Dose:  5 mg   Take 1 tablet (5 mg) by mouth 2 times daily   Quantity:  30 tablet   Refills:  1         CONTINUE these medicines which have NOT CHANGED        Dose / Directions    amLODIPine 10 MG tablet   Commonly known as:  NORVASC   Used for:  HTN (hypertension)        TAKE ONE TABLET BY MOUTH EVERY DAY   Quantity:  90 tablet   Refills:  3       levothyroxine 150 MCG tablet   Commonly known as:  SYNTHROID/LEVOTHROID   Used for:  Papillary thyroid carcinoma (H)        Dose:  150 mcg   Take 1 tablet (150 mcg) by mouth daily   Quantity:  90 tablet   Refills:  4       lisinopril 5 MG tablet   Commonly known as:  PRINIVIL/ZESTRIL   Used for:  HTN (hypertension)        TAKE ONE TABLET BY MOUTH EVERY DAY   Quantity:  90 tablet   Refills:  3       magnesium oxide 400 (241.3 MG) MG tablet   Commonly known as:  MAG-OX   Used for:  Hypomagnesemia        Dose:  400 mg   Take 1 tablet (400 mg) by mouth 2 times daily   Quantity:  120 tablet   Refills:  11       mycophenolate 250 MG capsule   Commonly known as:  GENERIC EQUIVALENT   Used for:  Kidney replaced by transplant        Dose:  250 mg   Take 1 capsule (250 mg) by mouth 2 times daily   Quantity:  60 capsule   Refills:  11       predniSONE 5 MG tablet   Commonly known as:  DELTASONE   Used for:  Liver replaced by transplant (H), S/P kidney transplant        Dose:  5 mg   Take 1 tablet (5 mg) by mouth daily   Quantity:  90 tablet   Refills:  3        sulfamethoxazole-trimethoprim 400-80 MG per tablet   Commonly known as:  BACTRIM/SEPTRA   Indication:  PCP Prophylaxis   Used for:  Liver replaced by transplant (H), S/P kidney transplant        Dose:  1 tablet   Take 1 tablet by mouth daily   Quantity:  30 tablet   Refills:  11       * tacrolimus 1 MG capsule   Commonly known as:  GENERIC EQUIVALENT   Used for:  Liver replaced by transplant (H), S/P kidney transplant        Dose:  1 mg   Take 1 capsule (1 mg) by mouth 2 times daily 1.5 mg twice per day.   Quantity:  60 capsule   Refills:  5       * tacrolimus 0.5 MG capsule   Commonly known as:  GENERIC EQUIVALENT   Used for:  S/P kidney transplant        Dose:  0.5 mg   Take 1 capsule (0.5 mg) by mouth 2 times daily 1.5 mg twice per day.   Quantity:  60 capsule   Refills:  5       * Notice:  This list has 2 medication(s) that are the same as other medications prescribed for you. Read the directions carefully, and ask your doctor or other care provider to review them with you.         Where to get your medicines      These medications were sent to [facility] [address] [address]     Phone:  [phone]     apixaban ANTICOAGULANT 5 MG tablet                Protect others around you: Learn how to safely use, store and throw away your medicines at www.disposemymeds.org.             Medication List: This is a list of all your medications and when to take them. Check marks below indicate your daily home schedule. Keep this list as a reference.      Medications           Morning Afternoon Evening Bedtime As Needed    amLODIPine 10 MG tablet   Commonly known as:  NORVASC   TAKE ONE TABLET BY MOUTH EVERY DAY   Last time this was given:  10 mg on 3/7/2018  8:06 AM                                apixaban ANTICOAGULANT 5 MG tablet   Commonly known as:  ELIQUIS   Take 1 tablet (5 mg) by mouth 2 times daily   Last time this was given:  5 mg on  3/7/2018  8:09 AM                                levothyroxine 150 MCG tablet   Commonly known as:  SYNTHROID/LEVOTHROID   Take 1 tablet (150 mcg) by mouth daily   Last time this was given:  150 mcg on 3/7/2018  8:09 AM                                lisinopril 5 MG tablet   Commonly known as:  PRINIVIL/ZESTRIL   TAKE ONE TABLET BY MOUTH EVERY DAY   Last time this was given:  5 mg on 3/7/2018  8:09 AM                                magnesium oxide 400 (241.3 MG) MG tablet   Commonly known as:  MAG-OX   Take 1 tablet (400 mg) by mouth 2 times daily                                mycophenolate 250 MG capsule   Commonly known as:  GENERIC EQUIVALENT   Take 1 capsule (250 mg) by mouth 2 times daily   Last time this was given:  250 mg on 3/7/2018  8:09 AM                                predniSONE 5 MG tablet   Commonly known as:  DELTASONE   Take 1 tablet (5 mg) by mouth daily   Last time this was given:  5 mg on 3/7/2018  8:09 AM                                sulfamethoxazole-trimethoprim 400-80 MG per tablet   Commonly known as:  BACTRIM/SEPTRA   Take 1 tablet by mouth daily   Last time this was given:  1 tablet on 3/7/2018  8:09 AM                                * tacrolimus 1 MG capsule   Commonly known as:  GENERIC EQUIVALENT   Take 1 capsule (1 mg) by mouth 2 times daily 1.5 mg twice per day.   Last time this was given:  1.5 mg on 3/7/2018  8:09 AM                                * tacrolimus 0.5 MG capsule   Commonly known as:  GENERIC EQUIVALENT   Take 1 capsule (0.5 mg) by mouth 2 times daily 1.5 mg twice per day.   Last time this was given:  1.5 mg on 3/7/2018  8:09 AM                                * Notice:  This list has 2 medication(s) that are the same as other medications prescribed for you. Read the directions carefully, and ask your doctor or other care provider to review them with you.

## 2018-03-06 NOTE — LETTER
Transition Communication Hand-off for Care Transitions to Next Level of Care Provider    Name: Cricket Chen  MRN #: [id number]  Primary Care Provider: [facility]     Primary Clinic: No address on file     Reason for Hospitalization:  Atrial flutter, unspecified type (H) [I48.92]  Admit Date/Time: 3/6/2018 11:54 AM  Discharge Date: 3/7/18  Payor Source: Payor: [facility] / Plan: [employer]US MEDICARE / Product Type: HMO   Readmission Assessment Measure (BELINDA) Risk Score/category:   Reason for Communication Hand-off Referral: Multiple providers/specialties  Discharge Plan:   Concern for non-adherence with plan of care: no  Discharge Needs Assessment:  Follow-up specialty is recommended: Yes    Follow-up plan:  Future Appointments  Date Time Provider Department Center   3/9/2018 11:15 AM [facility] LAB [facility]   8/2/2018 3:00 PM [facility] D[facility]   8/2/2018 3:30 PM Girish Narayanan MD [facility]   8/6/2018 2:20 PM Jake Sidhu MD [facility]   8/10/2018 1:50 PM Zuleyma Gray MD [facility]   1/15/2019 12:10 PM Laureen Galvan MD [facility]   1/15/2019 1:05 PM Jake Sidhu MD [facility]                 Key Recommendations:  Post hospitalization follow up.     EDYTA HICKS RN CC

## 2018-03-06 NOTE — ED NOTES
[facility], [place]   ED Nurse to Floor Handoff     Cricket Chen is a 37 year old male who speaks English and lives alone,  in a home  They arrived in the ED by car from home    ED Chief Complaint: Respiratory Problems (chest tightness and SOB)    ED Dx;   Final diagnoses:   Atrial flutter, unspecified type (H)         Needed?: No    Allergies:   Allergies   Allergen Reactions     Hydromorphone Itching   .  Past Medical Hx:   Past Medical History:   Diagnosis Date     Alcohol abuse     Last drink in Mid-April 2014     Anemia in ESRD (end-stage renal disease) (H)      Anxiety 2008     Atrial flutter (H)      Cirrhosis (H)     S/P liver transplant     Depression      History of blood transfusion      History of transposition of great vessels     atrial switch at age 8 months old     Hypertension      Liver transplant recipient (H)     2016     Papillary thyroid carcinoma (H)      Pneumonia 11-15-14     Renal transplant recipient     2016     Varices, esophageal (H)       Baseline Mental status: WDL  Current Mental Status changes: at basesline    Infection: No  Sepsis suspected: No  Isolation type: No active isolations     Activity level - Baseline/Home:  Independent  Activity Level - Current:   Independent    Bariatric equipment needed?: No    In the ED these meds were given:   Medications   0.9% sodium chloride BOLUS (0 mLs Intravenous Stopped 3/6/18 1611)       Drips running?  No    Home pump or pre-existing LDA's present? No    Labs results:   Labs Ordered and Resulted from Time of ED Arrival Up to the Time of Departure from the ED   CBC WITH PLATELETS DIFFERENTIAL - Abnormal; Notable for the following:        Result Value    WBC 3.5 (*)     Platelet Count 126 (*)     Absolute Lymphocytes 0.4 (*)     All other components within normal limits   COMPREHENSIVE METABOLIC PANEL - Abnormal; Notable for the following:     Glucose 100 (*)     Creatinine 1.36 (*)     GFR Estimate  59 (*)     All other components within normal limits   INR - Abnormal; Notable for the following:     INR 1.18 (*)     All other components within normal limits   LIPASE   TROPONIN I   PARTIAL THROMBOPLASTIN TIME   D DIMER QUANTITATIVE   MAGNESIUM   TSH WITH FREE T4 REFLEX   PERIPHERAL IV CATHETER   CARDIAC CONTINUOUS MONITORING       Imaging Studies:   Recent Results (from the past 24 hour(s))   XR Chest 2 Views   Result Value    Radiologist flags Potential sclerotic medial right 10th rib.    Narrative    Exam:  Chest X-ray 3/6/2018 1:33 PM    History: chest pain;     Comparison: CT 1/30/2018, chest x-ray 5/12/2016    Findings: PA and lateral views the chest. Right IJ Port-A-Cath with  tip projecting over the mid SVC. The trachea is midline. The cardiac  silhouette is normal in size. The pulmonary vasculature is distinct.  No pleural effusion or pneumothorax. Streaky bilateral perihilar  opacities. The sclerotic area of the central portion of the right T10  rib. This appears to be summation of shadows but I am not convinced  that it could be a sclerotic rib lesion. Not present on a recent CT  scan or chest x-ray. Surgical clips project over the upper quadrant  and mid abdomen.      Impression    Impression:   1. Right IJ Port-A-Cath with tip projecting over the mid SVC.   2. No acute cardiopulmonary abnormalities.  3. Sclerotic appearing medial right rib. Likely summation of shadows.  The patient does have PTLD and therefore low dose a CT of the chest  recommended to confirm that it is summation of shadows rather than a  new sclerotic lesion.    [Consider Follow Up: Potential sclerotic medial right 10th rib.]    This report will be copied to the [facility] to ensure a  provider acknowledges the finding.        I have personally reviewed the examination and initial interpretation  and I agree with the findings.    TASIA VILLA MD       Recent vital signs:   /69  Pulse 110  Temp 97.8  F (36.6  " C) (Oral)  Resp 20  Ht 1.778 m (5' 10\")  Wt 94.8 kg (208 lb 14.4 oz)  SpO2 99%  BMI 29.97 kg/m2    Cardiac Rhythm: Aflutter, R BBB  Pt needs tele? Yes  Skin/wound Issues: None    Code Status: Full Code    Pain control: pt had none    Nausea control: pt had none    Abnormal labs/tests/findings requiring intervention: EKG    Family present during ED course? No   Family Comments/Social Situation comments: N/A    Tasks needing completion: None   Pt presented with SOB and chest tightness. Trop WDL. VSS on RA. Hx of kidney/liver txp in 2016 and aflutter. Plan for possible cardioversion.     Luma Perez, RN  [phone] [facility] ED    "

## 2018-03-06 NOTE — IP AVS SNAPSHOT
[address] [address]    Phone:  [phone]                                       After Visit Summary   3/6/2018    Cricket Chen    MRN: [id number]           After Visit Summary Signature Page     I have received my discharge instructions, and my questions have been answered. I have discussed any challenges I see with this plan with the nurse or doctor.    ..........................................................................................................................................  Patient/Patient Representative Signature      ..........................................................................................................................................  Patient Representative Print Name and Relationship to Patient    ..................................................               ................................................  Date                                            Time    ..........................................................................................................................................  Reviewed by Signature/Title    ...................................................              ..............................................  Date                                                            Time

## 2018-03-06 NOTE — H&P
[facility] Cardiology History and Physical    Cricket Chen MRN# [id number]   Age: 37 year old YOB: 1980     Date of Admission:  3/6/2018    Primary care provider: [facility]          Assessment and Plan:   Assessment: 36yo male with h/o transposition of the great vessels s/p baffle repair in 1981, s/p kidney and liver transplant (5/2016) c/b post-transplant lymphoproliferative disorder, history of atrial flutter admitted with shortness of breath, found to be in atrial flutter    Plan:  # Atrial flutter   Underwent direct current cardioversion 1/31/2003, EP study 8/14/2000 (Dr. Duran Arboleda, [facility]) 8/24/1988 (Dr. Norwood). Will try to get records from [facility] regarding prior EP studies.  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   Follows with Dr. Calvo at [facility] ([facility] Last seen 4/10/2017.  -- adult congenital cardiology consult regarding utility of cardiac MRI while inpatient    # ETOH cirrhosis s/p kidney liver transplant (5/2016) c/b post-transplant lymphoproliferative disorder  -- continue current immunosuppression (tacrolimus, mycophenolate, prednisone)  -- continue bactrim    #HTN  -- continue lisinopril, amlodipine    #Papillary thyroid cancer s/p thyroidectomy  -- continue levothyroxine    FEN: NPO at [facility] for cardioversion in AM  Prophylaxis: start apixaban    Code Status: FULL    Disposition: likely tomorrow after cardioversion    Will be formally staffed in the AM.    Laureen Wing MD  Internal Medicine PGY-3  [phone]    CARDIOLOGY STAFF NOTE  I have seen and examined the patient with the Cards 1 team. I agree with the note above that reflects my assessment and  "plan of care.  Jaylen George MD [facility]  Cardiology - Electrophysiology            Chief Complaint:   Shortness of breath         History of Present Illness:   Mr. 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 He reports that he woke up yesterday with shortness of breath and chest tightness and felt like his heart \"wasn't beating right\". He recalled that it felt similar to when he was in atrial flutter in the past.   In the ED, He was found to be in atrial flutter with rates 70s-90s.    Denies fevers, chills, cough, abdominal pain, diarrhea, constipation, melena, or hematochezia.         Review of Systems:    negative except as mentioned above     Prior cardiac studies     Cardiac MRI 3/9/2017    1. Transposition of the great arteries with native atrioventricular   concordance and ventricular arterial discordance with the aorta anterior   to the pulmonary artery (D-transposition of the great arteries).  2. Status post interatrial baffle with the superior vena cava and inferior   vena cava baffled to the left (subpulmonic) ventricle without obstruction.    There is no evidence of a baffle leak.    3. Systemic right ventricle:  a. The right ventricle is hypertrophied.  b. Decreased systolic function with an ejection fraction of 33%.  c. Severe dilation of the right ventricle with an end-diastolic volume of   208 mL/m  (previously 188 mL/m ) and end-systolic volume of 140 mL/m    (previous 123 mL/m ).  4. The right ventricle connects with the anterior aorta.  The coronary   artery origins are usual for transposition.  Left-sided aortic arch with   measurements above.  5. The pulmonary venous baffle to the right ventricle is widely patent.  6. The subpulmonary left ventricle " has normal systolic function with   decreased myocardial mass.  The left ventricle connects to the pulmonary   artery, which is posterior to the aorta.  7. No significant change from previous cardiac MRI of 9/4/2012 except an   increased size in the indexed right ventricular diastolic and systolic   Volumes.    Last echo 11/3/2015           Past Medical History:     Medical History reviewed.   Past Medical History:   Diagnosis Date     Alcohol abuse     Last drink in Mid-April 2014     Anemia in ESRD (end-stage renal disease) (H)      Anxiety 2008     Atrial flutter (H)      Cirrhosis (H)     S/P liver transplant     Depression      History of blood transfusion      History of transposition of great vessels     atrial switch at age 8 months old     Hypertension      Liver transplant recipient (H)     2016     Papillary thyroid carcinoma (H)      Pneumonia 11-15-14     Renal transplant recipient     2016     Varices, esophageal (H)              Past Surgical History:   Surgical History reviewed.   Past Surgical History:   Procedure Laterality Date     BENCH LIVER N/A 5/10/2016    Procedure: BENCH LIVER;  Surgeon: Ricky Deshpande MD;  Location: [facility] OR     BIOPSY LYMPH NODE CERVICAL Right 1/26/2017    Procedure: BIOPSY LYMPH NODE CERVICAL;  Surgeon: Beka Soto MD;  Location: [facility] OR     CARDIAC SURGERY  [birth year]     CREATE FISTULA ARTERIOVENOUS UPPER EXTREMITY Right 9/16/2014    Procedure: CREATE FISTULA ARTERIOVENOUS UPPER EXTREMITY;  Surgeon: Padmaja Eaton MD;  Location: [facility] OR     CYSTOSCOPY, REMOVE STENT(S), COMBINED Right 6/22/2016    Procedure: COMBINED CYSTOSCOPY, REMOVE STENT(S);  Surgeon: Ricky Deshpande MD;  Location: [facility] OR     ENT SURGERY       ESOPHAGOSCOPY, GASTROSCOPY, DUODENOSCOPY (EGD), COMBINED  5/30/2014    Procedure: COMBINED ESOPHAGOSCOPY, GASTROSCOPY, DUODENOSCOPY (EGD);  Surgeon: Guillaume Bautista MD;  Location:  GI     ESOPHAGOSCOPY, GASTROSCOPY, DUODENOSCOPY (EGD), COMBINED  9/30/14      ESOPHAGOSCOPY, GASTROSCOPY, DUODENOSCOPY (EGD), COMBINED Left 3/12/2015    Procedure: COMBINED ESOPHAGOSCOPY, GASTROSCOPY, DUODENOSCOPY (EGD);  Surgeon: Laureen Galvan MD;  Location: [facility] GI     ESOPHAGOSCOPY, GASTROSCOPY, DUODENOSCOPY (EGD), COMBINED N/A 2016    Procedure: COMBINED ESOPHAGOSCOPY, GASTROSCOPY, DUODENOSCOPY (EGD);  Surgeon: Laureen Galvan MD;  Location:  GI     ESOPHAGOSCOPY, GASTROSCOPY, DUODENOSCOPY (EGD), COMBINED N/A 2016    Procedure: COMBINED ESOPHAGOSCOPY, GASTROSCOPY, DUODENOSCOPY (EGD);  Surgeon: Laureen Galvan MD;  Location:  GI     HC OR CATH ABLATION NON-CARDIAC ENDOVASCULAR      SVT     INSERT PORT VASCULAR ACCESS N/A 4/3/2017    Procedure: INSERT PORT VASCULAR ACCESS;  Surgeon: Rajendra Jacques PA-C;  Location: [facility] OR     LIGATE FISTULA ARTERIOVENOUS UPPER EXTREMITY Right 2017    Procedure: LIGATE FISTULA ARTERIOVENOUS UPPER EXTREMITY;  Revision of Right Arm Arteriovenous Fistula ;  Surgeon: Padmaja Eaton MD;  Location: [facility] OR     PICC INSERTION  14    PICC line placement 14; Removal 2014     THORACIC SURGERY  1980    Transposition great arteries, repaired at 8 months     THYROIDECTOMY Right 2017    Procedure: THYROIDECTOMY;  Bilateral Total Thyroidectomy ;  Surgeon: Beka Soto MD;  [facility] OR     TRANSPLANT KIDNEY RECIPIENT  DONOR  5/10/2016    Procedure: TRANSPLANT KIDNEY RECIPIENT  DONOR;  Surgeon: Ricky Deshpande MD;  Location:  OR     TRANSPLANT LIVER RECIPIENT  DONOR N/A 5/10/2016    Procedure: TRANSPLANT LIVER RECIPIENT  DONOR;  Surgeon: Ricky Deshpande MD;  Location:  OR             Social History:   Social History reviewed.   Social History   Substance Use Topics     Smoking status: Former Smoker     Packs/day: 0.33     Years: 3.00     Types: Cigarettes     Start date: 1997     Quit date: 2000     Smokeless tobacco: Former  "User     Alcohol use No      Comment: ~10 drinks per day for ten years, quit in April of 2014             Family History:   Family History reviewed.  Family History   Problem Relation Age of Onset     Hypertension Brother      Hypertension Sister      Arthritis Father      Hypertension Father      Hypertension Mother      Hypertension Sister      Alcohol/Drug No family hx of      GASTROINTESTINAL DISEASE No family hx of      no fam hx of liver disease or liver cancer             Allergies:     Allergies   Allergen Reactions     Hydromorphone Itching             Medications:   Medications Reviewed.   No current facility-administered medications for this encounter.      Current Outpatient Prescriptions   Medication Sig     sulfamethoxazole-trimethoprim (BACTRIM/SEPTRA) 400-80 MG per tablet Take 1 tablet by mouth daily     tacrolimus (GENERIC EQUIVALENT) 1 MG capsule Take 1 capsule (1 mg) by mouth 2 times daily 1.5 mg twice per day.     tacrolimus (GENERIC EQUIVALENT) 0.5 MG capsule Take 1 capsule (0.5 mg) by mouth 2 times daily 1.5 mg twice per day.     mycophenolate (GENERIC EQUIVALENT) 250 MG capsule Take 1 capsule (250 mg) by mouth 2 times daily     lisinopril (PRINIVIL/ZESTRIL) 5 MG tablet TAKE ONE TABLET BY MOUTH EVERY DAY     amLODIPine (NORVASC) 10 MG tablet TAKE ONE TABLET BY MOUTH EVERY DAY     levothyroxine (SYNTHROID/LEVOTHROID) 150 MCG tablet Take 1 tablet (150 mcg) by mouth daily     magnesium oxide (MAG-OX) 400 (241.3 MG) MG tablet Take 1 tablet (400 mg) by mouth 2 times daily     predniSONE (DELTASONE) 5 MG tablet Take 1 tablet (5 mg) by mouth daily     [DISCONTINUED] levothyroxine (SYNTHROID/LEVOTHROID) 137 MCG tablet Take 1 tablet (137 mcg) by mouth daily             Physical Exam:   Vitals were reviewed.  Blood pressure 108/69, pulse 110, temperature 97.8  F (36.6  C), temperature source Oral, resp. rate 20, height 1.778 m (5' 10\"), weight 94.8 kg (208 lb 14.4 oz), SpO2 99 %.    General: AAOx3, " NAD  Skin: Not jaundiced, no rash, no ecchymoses  HEENT: MMM, PERRLA, EOM intact  CV: RRR, normal S1S2, no murmur, clicks, rubs. Port on right chest  Resp: Clear to auscultation bilaterally, no wheezes, rhonchi  Abd: Soft, non-tender, BS+, no masses appreciated  Extremities: warm and well perfused, palpable pulses, no edema  Neuro: No lateralizing symptoms or focal neurologic deficits           Data:        ROUTINE LABS (Last four results)  CMP  Recent Labs  Lab 03/06/18  1255      POTASSIUM 3.8   CHLORIDE 107   CO2 26   ANIONGAP 8   *   BUN 18   CR 1.36*   GFRESTIMATED 59*   GFRESTBLACK 71   COLBY 9.2   MAG 2.2   PROTTOTAL 6.8   ALBUMIN 3.9   BILITOTAL 0.7   ALKPHOS 84   AST 18   ALT 24     CBC  Recent Labs  Lab 03/06/18  1255   WBC 3.5*   RBC 4.98   HGB 15.3   HCT 44.7   MCV 90   MCH 30.7   MCHC 34.2   RDW 13.5   *     INR  Recent Labs  Lab 03/06/18  1255   INR 1.18*     Arterial Blood GasNo lab results found in last 7 days.

## 2018-03-07 ENCOUNTER — APPOINTMENT (OUTPATIENT)
Dept: CARDIOLOGY | Facility: CLINIC | Age: 38
End: 2018-03-07
Attending: EMERGENCY MEDICINE
Payer: COMMERCIAL

## 2018-03-07 ENCOUNTER — APPOINTMENT (OUTPATIENT)
Dept: CARDIOLOGY | Facility: CLINIC | Age: 38
End: 2018-03-07
Attending: INTERNAL MEDICINE
Payer: COMMERCIAL

## 2018-03-07 ENCOUNTER — ANESTHESIA (OUTPATIENT)
Dept: SURGERY | Facility: CLINIC | Age: 38
End: 2018-03-07
Payer: COMMERCIAL

## 2018-03-07 ENCOUNTER — ANESTHESIA EVENT (OUTPATIENT)
Dept: SURGERY | Facility: CLINIC | Age: 38
End: 2018-03-07
Payer: COMMERCIAL

## 2018-03-07 VITALS
RESPIRATION RATE: 16 BRPM | DIASTOLIC BLOOD PRESSURE: 73 MMHG | BODY MASS INDEX: 29.39 KG/M2 | HEART RATE: 73 BPM | TEMPERATURE: 97.5 F | WEIGHT: 205.3 LBS | OXYGEN SATURATION: 95 % | SYSTOLIC BLOOD PRESSURE: 106 MMHG | HEIGHT: 70 IN

## 2018-03-07 PROBLEM — I48.91 ATRIAL FIBRILLATION (H): Status: ACTIVE | Noted: 2018-03-07

## 2018-03-07 LAB
ANION GAP SERPL CALCULATED.3IONS-SCNC: 9 MMOL/L (ref 3–14)
BUN SERPL-MCNC: 21 MG/DL (ref 7–30)
CALCIUM SERPL-MCNC: 9 MG/DL (ref 8.5–10.1)
CHLORIDE SERPL-SCNC: 110 MMOL/L (ref 94–109)
CO2 SERPL-SCNC: 24 MMOL/L (ref 20–32)
CREAT SERPL-MCNC: 1.43 MG/DL (ref 0.66–1.25)
ERYTHROCYTE [DISTWIDTH] IN BLOOD BY AUTOMATED COUNT: 13.5 % (ref 10–15)
GFR SERPL CREATININE-BSD FRML MDRD: 55 ML/MIN/1.7M2
GLUCOSE SERPL-MCNC: 89 MG/DL (ref 70–99)
HCT VFR BLD AUTO: 44.3 % (ref 40–53)
HGB BLD-MCNC: 14.7 G/DL (ref 13.3–17.7)
INTERPRETATION ECG - MUSE: NORMAL
MCH RBC QN AUTO: 29.9 PG (ref 26.5–33)
MCHC RBC AUTO-ENTMCNC: 33.2 G/DL (ref 31.5–36.5)
MCV RBC AUTO: 90 FL (ref 78–100)
PLATELET # BLD AUTO: 119 10E9/L (ref 150–450)
POTASSIUM SERPL-SCNC: 4 MMOL/L (ref 3.4–5.3)
RBC # BLD AUTO: 4.91 10E12/L (ref 4.4–5.9)
SODIUM SERPL-SCNC: 142 MMOL/L (ref 133–144)
WBC # BLD AUTO: 3 10E9/L (ref 4–11)

## 2018-03-07 PROCEDURE — 25000128 H RX IP 250 OP 636: Performed by: INTERNAL MEDICINE

## 2018-03-07 PROCEDURE — 25000125 ZZHC RX 250: Performed by: INTERNAL MEDICINE

## 2018-03-07 PROCEDURE — 92960 CARDIOVERSION ELECTRIC EXT: CPT

## 2018-03-07 PROCEDURE — 93320 DOPPLER ECHO COMPLETE: CPT | Mod: 26 | Performed by: INTERNAL MEDICINE

## 2018-03-07 PROCEDURE — 27210995 ZZH RX 272: Performed by: INTERNAL MEDICINE

## 2018-03-07 PROCEDURE — 99217 ZZC OBSERVATION CARE DISCHARGE: CPT | Mod: GC | Performed by: INTERNAL MEDICINE

## 2018-03-07 PROCEDURE — 25000131 ZZH RX MED GY IP 250 OP 636 PS 637

## 2018-03-07 PROCEDURE — 80048 BASIC METABOLIC PNL TOTAL CA: CPT | Performed by: INTERNAL MEDICINE

## 2018-03-07 PROCEDURE — 25000128 H RX IP 250 OP 636: Performed by: NURSE ANESTHETIST, CERTIFIED REGISTERED

## 2018-03-07 PROCEDURE — 25000131 ZZH RX MED GY IP 250 OP 636 PS 637: Performed by: INTERNAL MEDICINE

## 2018-03-07 PROCEDURE — 36592 COLLECT BLOOD FROM PICC: CPT | Performed by: INTERNAL MEDICINE

## 2018-03-07 PROCEDURE — 99153 MOD SED SAME PHYS/QHP EA: CPT

## 2018-03-07 PROCEDURE — G0378 HOSPITAL OBSERVATION PER HR: HCPCS

## 2018-03-07 PROCEDURE — 85027 COMPLETE CBC AUTOMATED: CPT | Performed by: INTERNAL MEDICINE

## 2018-03-07 PROCEDURE — 25000132 ZZH RX MED GY IP 250 OP 250 PS 637: Performed by: INTERNAL MEDICINE

## 2018-03-07 PROCEDURE — 93325 DOPPLER ECHO COLOR FLOW MAPG: CPT

## 2018-03-07 PROCEDURE — 93325 DOPPLER ECHO COLOR FLOW MAPG: CPT | Mod: 26 | Performed by: INTERNAL MEDICINE

## 2018-03-07 PROCEDURE — 93010 ELECTROCARDIOGRAM REPORT: CPT | Mod: 59 | Performed by: INTERNAL MEDICINE

## 2018-03-07 PROCEDURE — 99152 MOD SED SAME PHYS/QHP 5/>YRS: CPT | Performed by: INTERNAL MEDICINE

## 2018-03-07 PROCEDURE — 93005 ELECTROCARDIOGRAM TRACING: CPT

## 2018-03-07 PROCEDURE — 99152 MOD SED SAME PHYS/QHP 5/>YRS: CPT

## 2018-03-07 PROCEDURE — 93312 ECHO TRANSESOPHAGEAL: CPT | Mod: 26 | Performed by: INTERNAL MEDICINE

## 2018-03-07 PROCEDURE — 92960 CARDIOVERSION ELECTRIC EXT: CPT | Mod: GC | Performed by: INTERNAL MEDICINE

## 2018-03-07 PROCEDURE — 25000125 ZZHC RX 250: Performed by: NURSE ANESTHETIST, CERTIFIED REGISTERED

## 2018-03-07 PROCEDURE — 37000008 ZZH ANESTHESIA TECHNICAL FEE, 1ST 30 MIN

## 2018-03-07 RX ORDER — ACYCLOVIR 200 MG/1
3 CAPSULE ORAL ONCE
Status: COMPLETED | OUTPATIENT
Start: 2018-03-07 | End: 2018-03-07

## 2018-03-07 RX ORDER — SODIUM CHLORIDE 9 MG/ML
INJECTION, SOLUTION INTRAVENOUS CONTINUOUS PRN
Status: DISCONTINUED | OUTPATIENT
Start: 2018-03-07 | End: 2018-03-07

## 2018-03-07 RX ORDER — POTASSIUM CHLORIDE 750 MG/1
20 TABLET, EXTENDED RELEASE ORAL
Status: CANCELLED | OUTPATIENT
Start: 2018-03-07

## 2018-03-07 RX ORDER — SODIUM CHLORIDE 9 MG/ML
INJECTION, SOLUTION INTRAVENOUS CONTINUOUS PRN
Status: DISCONTINUED | OUTPATIENT
Start: 2018-03-07 | End: 2018-03-07 | Stop reason: HOSPADM

## 2018-03-07 RX ORDER — FLUMAZENIL 0.1 MG/ML
0.2 INJECTION, SOLUTION INTRAVENOUS
Status: DISCONTINUED | OUTPATIENT
Start: 2018-03-07 | End: 2018-03-07 | Stop reason: HOSPADM

## 2018-03-07 RX ORDER — HEPARIN SODIUM (PORCINE) LOCK FLUSH IV SOLN 100 UNIT/ML 100 UNIT/ML
2.5 SOLUTION INTRAVENOUS ONCE
Status: COMPLETED | OUTPATIENT
Start: 2018-03-07 | End: 2018-03-07

## 2018-03-07 RX ORDER — FENTANYL CITRATE 50 UG/ML
25-50 INJECTION, SOLUTION INTRAMUSCULAR; INTRAVENOUS
Status: DISCONTINUED | OUTPATIENT
Start: 2018-03-07 | End: 2018-03-07 | Stop reason: HOSPADM

## 2018-03-07 RX ORDER — LIDOCAINE 40 MG/G
CREAM TOPICAL
Status: DISCONTINUED | OUTPATIENT
Start: 2018-03-07 | End: 2018-03-07 | Stop reason: HOSPADM

## 2018-03-07 RX ORDER — FENTANYL CITRATE 50 UG/ML
50-100 INJECTION, SOLUTION INTRAMUSCULAR; INTRAVENOUS
Status: COMPLETED | OUTPATIENT
Start: 2018-03-07 | End: 2018-03-07

## 2018-03-07 RX ORDER — NALOXONE HYDROCHLORIDE 0.4 MG/ML
.1-.4 INJECTION, SOLUTION INTRAMUSCULAR; INTRAVENOUS; SUBCUTANEOUS
Status: DISCONTINUED | OUTPATIENT
Start: 2018-03-07 | End: 2018-03-07 | Stop reason: HOSPADM

## 2018-03-07 RX ORDER — POTASSIUM CHLORIDE 750 MG/1
40 TABLET, EXTENDED RELEASE ORAL
Status: CANCELLED | OUTPATIENT
Start: 2018-03-07

## 2018-03-07 RX ADMIN — FENTANYL CITRATE 50 MCG: 50 INJECTION, SOLUTION INTRAMUSCULAR; INTRAVENOUS at 09:49

## 2018-03-07 RX ADMIN — APIXABAN 5 MG: 5 TABLET, FILM COATED ORAL at 08:09

## 2018-03-07 RX ADMIN — PREDNISONE 5 MG: 5 TABLET ORAL at 08:09

## 2018-03-07 RX ADMIN — Medication 40 MG: at 10:08

## 2018-03-07 RX ADMIN — LIDOCAINE HYDROCHLORIDE 30 ML: 20 SOLUTION ORAL; TOPICAL at 09:21

## 2018-03-07 RX ADMIN — TOPICAL ANESTHETIC 0.5 ML: 200 SPRAY DENTAL; PERIODONTAL at 09:22

## 2018-03-07 RX ADMIN — TACROLIMUS 1.5 MG: 1 CAPSULE ORAL at 08:09

## 2018-03-07 RX ADMIN — MYCOPHENOLATE MOFETIL 250 MG: 250 CAPSULE ORAL at 08:09

## 2018-03-07 RX ADMIN — SODIUM CHLORIDE 6 ML: 9 INJECTION, SOLUTION INTRAMUSCULAR; INTRAVENOUS; SUBCUTANEOUS at 09:48

## 2018-03-07 RX ADMIN — SODIUM CHLORIDE: 9 INJECTION, SOLUTION INTRAVENOUS at 10:06

## 2018-03-07 RX ADMIN — SULFAMETHOXAZOLE AND TRIMETHOPRIM 1 TABLET: 400; 80 TABLET ORAL at 08:09

## 2018-03-07 RX ADMIN — LEVOTHYROXINE SODIUM 150 MCG: 150 TABLET ORAL at 08:09

## 2018-03-07 RX ADMIN — SODIUM CHLORIDE, PRESERVATIVE FREE 2.5 ML: 5 INJECTION INTRAVENOUS at 13:11

## 2018-03-07 RX ADMIN — MIDAZOLAM 3 MG: 1 INJECTION INTRAMUSCULAR; INTRAVENOUS at 09:49

## 2018-03-07 RX ADMIN — LISINOPRIL 5 MG: 5 TABLET ORAL at 08:09

## 2018-03-07 RX ADMIN — AMLODIPINE BESYLATE 10 MG: 10 TABLET ORAL at 08:06

## 2018-03-07 NOTE — PROGRESS NOTES
Pt arrived in ECHO department  for scheduled NEELIMA/ cardioversion.   Procedure explained, questions answered and consent signed. Discharge instructions discussed with patient.  Pt's throat sprayed at 0930, therefore pt will not be able to eat or drink until 2 hours after at 1130.  Informed pt of this time and encouraged to start with warm fluids and soft foods.    Pt tolerated procedure well, and was given a total of 50 mcg IV fentanyl and 3 mg IV versed for conscious sedation.  Pt denied throat or chest pain after NEELIMA complete.   NEELIMA probe 51 used for procedure.  No clots visualized on NEELIMA so proceeded with DCCV.  Anesthesia gave pt 40 mg IV brevitol for sedation and pt was DCCV at 120 Joules to a SR.  Post EKG done.    Pt denied C.P or throat pain after procedure and was transferred back to 6C on litter after awake and VSS.  Report given to 6C RN.

## 2018-03-07 NOTE — PROGRESS NOTES
Care Coordinator Progress Note     Admission Date/Time:  3/6/2018  Attending MD:  Jaylen George MD     Data  Chart reviewed, discussed with interdisciplinary team.   Patient was admitted for: Atrial flutter, unspecified type (H).  Pt with history of kidney and liver transplant in 2016.     Assessment  Per chart review and interdisciplinary rounds pt lives with his parents and is independent with his care, no discharge needs anticipated. Per MD, plan is for NEELIMA cardioversion today and pt will discharge on apixaban for anticoagulation.      Plan  Anticipated Discharge Date: 3/7/2018  Anticipated Discharge Plan: Discharge to home  CC will continue to monitor patient's medical condition and progress towards discharge.  Ashleigh Sanderson RN BSN  6C Unit Care Coordinator  Phone number: [phone]  Pager: [phone]

## 2018-03-07 NOTE — DISCHARGE SUMMARY
"                                                                 Cardiology Discharge Summary  Cricket Chen MRN: [id number]  1980    Primary care provider: [facility]  ___________________________________          Date of Admission:  3/6/2018  Date of Discharge:  3/7/2018  Admitting Physician:  Jaylen George MD  Discharge Physician:  Jaylen George MD   Discharging Service:  [facility]     Primary Provider: [facility]         Reason for Admission:   Atrial flutter    From H&P:  Mr. 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 He reports that he woke up yesterday with shortness of breath and chest tightness and felt like his heart \"wasn't beating right\". He recalled that it felt similar to when he was in atrial flutter in the past.   In the ED, He was found to be in atrial flutter with rates 70s-90s.           Discharge Diagnosis:   Atrial flutter s/p cardioversion  History of transposition of the great arteries  ETOH cirrhosis s/p kidney liver transplant         Procedures & Significant Findings:   NEELIMA & Cardioversion 3/7/18  Interpretation Summary  Limited study  No intracardiac thrombus.  Systemic ventricle function is moderately reduced. Mild-moderate systemic  ventriclular dilation and hypertrophy.  Mild- moderate systemic atrioventricular valve regurgitation.  Normal subpulmonic ventricle size and function.  No intracardiac shunt as demonstrated by a bubble study  No pericardial effusion.  This study was compared with the study from 11/25/2014.There has been no  change.           Hospital Course by Problem:    Atrial flutter  History of transposition of the great vessels s/p baffle repair in 1981  Underwent direct " "current cardioversion 1/31/2003, EP study 8/14/2000 (Dr. Duran Arboleda, [facility]) 8/24/1988 (Dr. Norwood). Follows with Dr. Emile Calvo, though would like to transfer all care to [facility]. Presented to the ED with symptomatic atrial flutter. Was started on apixaban and underwent NEELIMA with cardioversion, converted to sinus rhythm prior to discharge. Will follow up with adult congenital cardiology at [facility]    Physical Exam on day of Discharge:  Blood pressure 106/73, pulse 73, temperature 97.5  F (36.4  C), temperature source Oral, resp. rate 16, height 1.778 m (5' 10\"), weight 93.1 kg (205 lb 4.8 oz), SpO2 95 %.    General: AAOx3, NAD  Skin: Not jaundiced, no rash, no ecchymoses  HEENT: MMM, PERRLA, EOM intact  CV: RRR, normal S1S2, no murmur, clicks, rubs. Port on right chest  Resp: Clear to auscultation bilaterally, no wheezes, rhonchi  Abd: Soft, non-tender, BS+, no masses appreciated  Extremities: warm and well perfused, palpable pulses, no edema  Neuro: No lateralizing symptoms or focal neurologic deficits         Discharge Medications:     Current Discharge Medication List      START taking these medications    Details   apixaban ANTICOAGULANT (ELIQUIS) 5 MG tablet Take 1 tablet (5 mg) by mouth 2 times daily  Qty: 30 tablet, Refills: 1    Associated Diagnoses: Atrial flutter, unspecified type (H)         CONTINUE these medications which have NOT CHANGED    Details   sulfamethoxazole-trimethoprim (BACTRIM/SEPTRA) 400-80 MG per tablet Take 1 tablet by mouth daily  Qty: 30 tablet, Refills: 11    Associated Diagnoses: Liver replaced by transplant (H); S/P kidney transplant      tacrolimus (GENERIC EQUIVALENT) 1 MG capsule Take 1 capsule (1 mg) by mouth 2 times daily 1.5 mg twice per day.  Qty: 60 capsule, Refills: 5    Associated Diagnoses: Liver replaced by transplant (H); S/P kidney transplant      tacrolimus (GENERIC EQUIVALENT) 0.5 MG capsule Take 1 capsule (0.5 mg) by mouth 2 times daily 1.5 mg twice per " day.  Qty: 60 capsule, Refills: 5    Associated Diagnoses: S/P kidney transplant      mycophenolate (GENERIC EQUIVALENT) 250 MG capsule Take 1 capsule (250 mg) by mouth 2 times daily  Qty: 60 capsule, Refills: 11    Associated Diagnoses: Kidney replaced by transplant      lisinopril (PRINIVIL/ZESTRIL) 5 MG tablet TAKE ONE TABLET BY MOUTH EVERY DAY  Qty: 90 tablet, Refills: 3    Associated Diagnoses: HTN (hypertension)      amLODIPine (NORVASC) 10 MG tablet TAKE ONE TABLET BY MOUTH EVERY DAY  Qty: 90 tablet, Refills: 3    Associated Diagnoses: HTN (hypertension)      levothyroxine (SYNTHROID/LEVOTHROID) 150 MCG tablet Take 1 tablet (150 mcg) by mouth daily  Qty: 90 tablet, Refills: 4    Associated Diagnoses: Papillary thyroid carcinoma (H)      magnesium oxide (MAG-OX) 400 (241.3 MG) MG tablet Take 1 tablet (400 mg) by mouth 2 times daily  Qty: 120 tablet, Refills: 11    Associated Diagnoses: Hypomagnesemia      predniSONE (DELTASONE) 5 MG tablet Take 1 tablet (5 mg) by mouth daily  Qty: 90 tablet, Refills: 3    Associated Diagnoses: Liver replaced by transplant (H); S/P kidney transplant                  Discharge Instructions and Follow-Up:     Discharge Procedure Orders  Reason for your hospital stay   Order Comments: You were admitted for atrial flutter, underwent a NEELIMA with cardioversion and rhythm returned to sinus rhythm.     Activity   Order Comments: Your activity upon discharge: activity as tolerated   Order Specific Question Answer Comments   Is discharge order? Yes      Adult [facility] Follow-up and recommended labs and tests   Order Comments: Follow up with Dr. Alba (Congenital cardiology) 2-4 weeks, [place] [facility]  [place] follow- up and to transfer care to [facility]. No follow up labs or test are needed.    Appointments on [facility] and/or [facility] (with [facility] or [facility] provider or service). Call [phone] if you haven't heard regarding these appointments within 7 days of discharge.     Full Code      Diet   Order Comments: Follow this diet upon discharge: Orders Placed This Encounter     NPO for Medical/Clinical Reasons Except for: Meds   Order Specific Question Answer Comments   Is discharge order? Yes                      Discharge Disposition:   home         Condition on Discharge:   Discharge condition: Stable   Code status on discharge: Full Code        Date of service: 3/7/2018    The patient was discussed with Dr. George.    Laureen Wing MD PGY-3  Internal Medicine  [phone]    CARDIOLOGY STAFF NOTE  I have seen and examined the patient with the Cards 1 team. I agree with the note above that reflects my assessment and plan of care. More than 30 min were spent coordinating discharge and follow-up  Jaylen George MD [facility]  Cardiology - Electrophysiology

## 2018-03-07 NOTE — PLAN OF CARE
Problem: Patient Care Overview  Goal: Plan of Care/Patient Progress Review  RN  1. Pt will be hemodynamically stable.   [id number]  VSS. Atrial flutter 60-70 with long R-R pauses at times. Patient can feel heart fluttering, had difficulty sleeping. Denies pain. Voiding, not saving despite encouragement to use urinal. Up ad maninder. NEELIMA/cardioversion scheduled today; has been NPO since midnight. Continue to monitor and follow POC.

## 2018-03-07 NOTE — PHARMACY-ADMISSION MEDICATION HISTORY
Admission medication history interview status for the 3/6/2018 admission is complete. See Epic admission navigator for allergy information, pharmacy, prior to admission medications and immunization status.     Medication history interview sources:  Cricket (patient)    Changes made to PTA medication list (reason)  Added: None  Deleted: None  Changed: None    Additional medication history information (including reliability of information, actions taken by pharmacist):    Cricket was pleasant to speak with and an excellent historian of his medication use. He could recite his entire regimen without prompting and was confident in his last doses.    Allergies and flu shot status confirmed with patient.    Home pharmacy: [facility]      Prior to Admission medications    Medication Sig Last Dose Taking? Auth Provider   sulfamethoxazole-trimethoprim (BACTRIM/SEPTRA) 400-80 MG per tablet Take 1 tablet by mouth daily 3/6/2018 at AM Yes Laureen Galvan MD   tacrolimus (GENERIC EQUIVALENT) 1 MG capsule Take 1 capsule (1 mg) by mouth 2 times daily 1.5 mg twice per day. 3/6/2018 at AM Yes Jake Sidhu MD   tacrolimus (GENERIC EQUIVALENT) 0.5 MG capsule Take 1 capsule (0.5 mg) by mouth 2 times daily 1.5 mg twice per day. 3/6/2018 at AM Yes Jake Sidhu MD   mycophenolate (GENERIC EQUIVALENT) 250 MG capsule Take 1 capsule (250 mg) by mouth 2 times daily 3/6/2018 at AM Yes Jake Sidhu MD   lisinopril (PRINIVIL/ZESTRIL) 5 MG tablet TAKE ONE TABLET BY MOUTH EVERY DAY 3/6/2018 at AM Yes Jake Sidhu MD   amLODIPine (NORVASC) 10 MG tablet TAKE ONE TABLET BY MOUTH EVERY DAY 3/6/2018 at AM Yes Jake Sidhu MD   levothyroxine (SYNTHROID/LEVOTHROID) 150 MCG tablet Take 1 tablet (150 mcg) by mouth daily 3/6/2018 at AM Yes Paula Hughes MD   magnesium oxide (MAG-OX) 400 (241.3 MG) MG tablet Take 1 tablet (400 mg) by mouth 2 times daily 3/6/2018 at  AM Yes Laureen Galvan MD   predniSONE (DELTASONE) 5 MG tablet Take 1 tablet (5 mg) by mouth daily 3/6/2018 at AM Yes Jake Sidhu MD         Medication history completed by: Eva Carroll, 2nd Year Student Pharmacist

## 2018-03-07 NOTE — ANESTHESIA PREPROCEDURE EVALUATION
Anesthesia Plan      History & Physical Review  History and physical reviewed and following examination; no interval change.    ASA Status:  3 .    NPO Status:  > 8 hours    Plan for MAC          Postoperative Care      Consents  Anesthetic plan, risks, benefits and alternatives discussed with:  Patient..            Allergies   Allergen Reactions     Hydromorphone Itching     Prescription Medications as of 3/7/2018             sulfamethoxazole-trimethoprim (BACTRIM/SEPTRA) 400-80 MG per tablet Take 1 tablet by mouth daily    tacrolimus (GENERIC EQUIVALENT) 1 MG capsule Take 1 capsule (1 mg) by mouth 2 times daily 1.5 mg twice per day.    tacrolimus (GENERIC EQUIVALENT) 0.5 MG capsule Take 1 capsule (0.5 mg) by mouth 2 times daily 1.5 mg twice per day.    mycophenolate (GENERIC EQUIVALENT) 250 MG capsule Take 1 capsule (250 mg) by mouth 2 times daily    lisinopril (PRINIVIL/ZESTRIL) 5 MG tablet TAKE ONE TABLET BY MOUTH EVERY DAY    amLODIPine (NORVASC) 10 MG tablet TAKE ONE TABLET BY MOUTH EVERY DAY    levothyroxine (SYNTHROID/LEVOTHROID) 150 MCG tablet Take 1 tablet (150 mcg) by mouth daily    magnesium oxide (MAG-OX) 400 (241.3 MG) MG tablet Take 1 tablet (400 mg) by mouth 2 times daily    predniSONE (DELTASONE) 5 MG tablet Take 1 tablet (5 mg) by mouth daily      Facility Administered Medications as of 3/7/2018             lidocaine 1 % 1 mL 1 mL by Other route every hour as needed (mild pain with VAD insertion or accessing implanted port)    lidocaine (LMX4) kit Apply topically every hour as needed for pain (with VAD insertion or accessing implanted port.)    sodium chloride (PF) 0.9% PF flush 3 mL 3 mLs by Intracatheter route every hour as needed for line flush    sodium chloride (PF) 0.9% PF flush 3 mL 3 mLs by Intracatheter route every 8 hours    sodium chloride 0.9% infusion Inject into the vein continuous prn (IF patient does not have IV fluids running prior to the procedure. )  "   lidocaine (viscous) (XYLOCAINE) 2 % solution 15 mL Take 15 mLs by mouth once    Benzocaine (HURRICAINE/TOPEX) 20 % spray 0.5-2 mL Take 0.5-2 mLs by mouth once    midazolam (VERSED) injection 1-2 mg Inject 1-2 mLs (1-2 mg) into the vein Once    Linked Group 1:  \"Followed by\" Linked Group Details     midazolam (VERSED) injection 1 mg Inject 1 mL (1 mg) into the vein every 2 minutes as needed for sedation (when verbally ordered by the prescriber during the procedure)    Linked Group 1:  \"Followed by\" Linked Group Details     fentaNYL (PF) (SUBLIMAZE) injection  mcg Inject 1-2 mLs ( mcg) into the vein Once    Linked Group 2:  \"Followed by\" Linked Group Details     fentaNYL (PF) (SUBLIMAZE) injection 25-50 mcg Inject 0.5-1 mLs (25-50 mcg) into the vein every 2 minutes as needed for moderate to severe pain (when verbally ordered by the prescriber during the procedure)    Linked Group 2:  \"Followed by\" Linked Group Details     naloxone (NARCAN) injection 0.1-0.4 mg Inject 0.25-1 mLs (0.1-0.4 mg) into the vein every 2 minutes as needed for opioid reversal or other (when verbally ordered by the prescriber during the procedure)    flumazenil (ROMAZICON) injection 0.2 mg Inject 2 mLs (0.2 mg) into the vein q1 min prn for benzodiazepine reversal (over sedation, when verbally ordered by the prescriber during the procedure )    sodium chloride bacteriostatic 0.9 % flush 3 mL Inject 3 mLs into the vein once    0.9% sodium chloride BOLUS Inject 1,000 mLs into the vein once    amLODIPine (NORVASC) tablet 10 mg Take 1 tablet (10 mg) by mouth daily    levothyroxine (SYNTHROID/LEVOTHROID) tablet 150 mcg Take 1 tablet (150 mcg) by mouth daily    lisinopril (PRINIVIL/ZESTRIL) tablet 5 mg Take 1 tablet (5 mg) by mouth daily    mycophenolate (GENERIC EQUIVALENT) capsule 250 mg Take 1 capsule (250 mg) by mouth 2 times daily    predniSONE (DELTASONE) tablet 5 mg Take 1 tablet (5 mg) by mouth daily    " sulfamethoxazole-trimethoprim (BACTRIM/SEPTRA) 400-80 MG per tablet 1 tablet Take 1 tablet by mouth daily    lidocaine 1 % 1 mL 1 mL by Other route every hour as needed (mild pain with VAD insertion or accessing implanted port)    lidocaine (LMX4) kit Apply topically every hour as needed for pain (with VAD insertion or accessing implanted port.)    sodium chloride (PF) 0.9% PF flush 3 mL 3 mLs by Intracatheter route every hour as needed for line flush (for peripheral IV flush post IV meds)    medication instruction continuous prn    acetaminophen (TYLENOL) tablet 650 mg Take 2 tablets (650 mg) by mouth every 4 hours as needed for mild pain    Patient is already receiving anticoagulation with heparin, enoxaparin (LOVENOX), warfarin (COUMADIN)  or other anticoagulant medication continuous prn    apixaban ANTICOAGULANT (ELIQUIS) tablet 5 mg Take 1 tablet (5 mg) by mouth 2 times daily    sodium chloride (PF) 0.9% PF flush 3 mL Inject 3 mLs into the vein every 8 hours    May take oral meds with a sip of water, the morning of NEELIMA procedure. continuous prn (May take oral meds with a sip of water, the morning of NEELIMA procedure.)    HOLD: Insulin - REGULAR AM of procedure IF diabetic HOLD    HOLD: Insulin - RAPID/SHORT acting AM of procedure IF diabetic HOLD    HOLD: Oral hypoglycemics AM of procedure IF diabetic HOLD    Give   of usual dose of LONG ACTING insulin AM of procedure IF diabetic continuous prn    tacrolimus (GENERIC EQUIVALENT) capsule 1.5 mg Take 1.5 mg by mouth 2 times daily    tacrolimus (GENERIC EQUIVALENT) capsule 0.5 mg (Discontinued) Take 1 capsule (0.5 mg) by mouth 2 times daily    tacrolimus (GENERIC EQUIVALENT) capsule 1 mg (Discontinued) Take 1 capsule (1 mg) by mouth 2 times daily    sodium chloride (PF) 0.9% PF flush 3 mL (Discontinued) 3 mLs by Intracatheter route every 8 hours    sodium chloride (PF) 0.9% PF flush 3 mL (Discontinued) Inject 3 mLs into the vein every hour as needed for line flush  (for peripheral IV flush post IV meds)      No results found for this or any previous visit (from the past 4320 hour(s)).  PAST MEDICAL HISTORY:   Past Medical History:   Diagnosis Date     Alcohol abuse     Last drink in Mid-April 2014     Anemia in ESRD (end-stage renal disease) (H)      Anxiety 2008     Atrial flutter (H)      Cirrhosis (H)     S/P liver transplant     Depression      History of blood transfusion      History of transposition of great vessels     atrial switch at age 8 months old     Hypertension      Liver transplant recipient (H)     2016     Papillary thyroid carcinoma (H)      Pneumonia 11-15-14     Renal transplant recipient     2016     Varices, esophageal (H)        PAST SURGICAL HISTORY:   Past Surgical History:   Procedure Laterality Date     BENCH LIVER N/A 5/10/2016    Procedure: BENCH LIVER;  Surgeon: Ricky Deshpande MD;  Location: [facility] OR     BIOPSY LYMPH NODE CERVICAL Right 1/26/2017    Procedure: BIOPSY LYMPH NODE CERVICAL;  Surgeon: Beka Soto MD;  Location: [facility] OR     CARDIAC SURGERY  9-10-80     CREATE FISTULA ARTERIOVENOUS UPPER EXTREMITY Right 9/16/2014    Procedure: CREATE FISTULA ARTERIOVENOUS UPPER EXTREMITY;  Surgeon: Padmaja Eaton MD;  Location: [facility] OR     CYSTOSCOPY, REMOVE STENT(S), COMBINED Right 6/22/2016    Procedure: COMBINED CYSTOSCOPY, REMOVE STENT(S);  Surgeon: Ricky Deshpande MD;  Location: [facility] OR     ENT SURGERY       ESOPHAGOSCOPY, GASTROSCOPY, DUODENOSCOPY (EGD), COMBINED  5/30/2014    Procedure: COMBINED ESOPHAGOSCOPY, GASTROSCOPY, DUODENOSCOPY (EGD);  Surgeon: Guillaume Bautista MD;  Location:  GI     ESOPHAGOSCOPY, GASTROSCOPY, DUODENOSCOPY (EGD), COMBINED  9/30/14     ESOPHAGOSCOPY, GASTROSCOPY, DUODENOSCOPY (EGD), COMBINED Left 3/12/2015    Procedure: COMBINED ESOPHAGOSCOPY, GASTROSCOPY, DUODENOSCOPY (EGD);  Surgeon: Laureen Galvan MD;  Location:  GI     ESOPHAGOSCOPY, GASTROSCOPY, DUODENOSCOPY (EGD), COMBINED N/A 4/21/2016     Procedure: COMBINED ESOPHAGOSCOPY, GASTROSCOPY, DUODENOSCOPY (EGD);  Surgeon: Laureen Galvan MD;  Location: [facility] GI     ESOPHAGOSCOPY, GASTROSCOPY, DUODENOSCOPY (EGD), COMBINED N/A 2016    Procedure: COMBINED ESOPHAGOSCOPY, GASTROSCOPY, DUODENOSCOPY (EGD);  Surgeon: Laureen Galvan MD;  Location: [facility] GI     HC OR CATH ABLATION NON-CARDIAC ENDOVASCULAR      SVT     INSERT PORT VASCULAR ACCESS N/A 4/3/2017    Procedure: INSERT PORT VASCULAR ACCESS;  Surgeon: Rjaendra Jacques PA-C;  Location: [facility] OR     LIGATE FISTULA ARTERIOVENOUS UPPER EXTREMITY Right 2017    Procedure: LIGATE FISTULA ARTERIOVENOUS UPPER EXTREMITY;  Revision of Right Arm Arteriovenous Fistula ;  Surgeon: Padmaja Eaton MD;  Location: [facility] OR     PICC INSERTION  14    PICC line placement 14; Removal 2014     THORACIC SURGERY  1980    Transposition great arteries, repaired at 8 months     THYROIDECTOMY Right 2017    Procedure: THYROIDECTOMY;  Bilateral Total Thyroidectomy ;  Surgeon: Beka Soto MD;  Location: [facility] OR     TRANSPLANT KIDNEY RECIPIENT  DONOR  5/10/2016    Procedure: TRANSPLANT KIDNEY RECIPIENT  DONOR;  Surgeon: Ricky Deshpande MD;  Location: [facility] OR     TRANSPLANT LIVER RECIPIENT  DONOR N/A 5/10/2016    Procedure: TRANSPLANT LIVER RECIPIENT  DONOR;  Surgeon: Ricky Deshpande MD;  Location: [facility] OR       FAMILY HISTORY:   Family History   Problem Relation Age of Onset     Hypertension Brother      Hypertension Sister      Arthritis Father      Hypertension Father      Hypertension Mother      Hypertension Sister      Alcohol/Drug No family hx of      GASTROINTESTINAL DISEASE No family hx of      no fam hx of liver disease or liver cancer       SOCIAL HISTORY:   Social History   Substance Use Topics     Smoking status: Former Smoker     Packs/day: 0.33     Years: 3.00     Types: Cigarettes     Start date: 1997     Quit date: 2000      Smokeless tobacco: Former User     Alcohol use No      Comment: ~10 drinks per day for ten years, quit in April of 2014     Lab Results   Component Value Date    WBC 3.0 (L) 03/07/2018    HGB 14.7 03/07/2018    HCT 44.3 03/07/2018     (L) 03/07/2018    CHOL 195 10/14/2014    TRIG 99 10/14/2014    HDL 55 10/14/2014    ALT 24 03/06/2018    AST 18 03/06/2018     03/07/2018    BUN 21 03/07/2018    CO2 24 03/07/2018    TSH 0.12 (L) 03/06/2018    PSA 0.65 10/14/2014    INR 1.18 (H) 03/06/2018     Prescriptions Prior to Admission   Medication Sig Dispense Refill Last Dose     sulfamethoxazole-trimethoprim (BACTRIM/SEPTRA) 400-80 MG per tablet Take 1 tablet by mouth daily 30 tablet 11 3/6/2018 at AM     tacrolimus (GENERIC EQUIVALENT) 1 MG capsule Take 1 capsule (1 mg) by mouth 2 times daily 1.5 mg twice per day. 60 capsule 5 3/6/2018 at AM     tacrolimus (GENERIC EQUIVALENT) 0.5 MG capsule Take 1 capsule (0.5 mg) by mouth 2 times daily 1.5 mg twice per day. 60 capsule 5 3/6/2018 at AM     mycophenolate (GENERIC EQUIVALENT) 250 MG capsule Take 1 capsule (250 mg) by mouth 2 times daily 60 capsule 11 3/6/2018 at AM     lisinopril (PRINIVIL/ZESTRIL) 5 MG tablet TAKE ONE TABLET BY MOUTH EVERY DAY 90 tablet 3 3/6/2018 at AM     amLODIPine (NORVASC) 10 MG tablet TAKE ONE TABLET BY MOUTH EVERY DAY 90 tablet 3 3/6/2018 at AM     levothyroxine (SYNTHROID/LEVOTHROID) 150 MCG tablet Take 1 tablet (150 mcg) by mouth daily 90 tablet 4 3/6/2018 at AM     magnesium oxide (MAG-OX) 400 (241.3 MG) MG tablet Take 1 tablet (400 mg) by mouth 2 times daily 120 tablet 11 3/6/2018 at AM     predniSONE (DELTASONE) 5 MG tablet Take 1 tablet (5 mg) by mouth daily 90 tablet 3 3/6/2018 at AM                       .

## 2018-03-07 NOTE — UTILIZATION REVIEW
"  Admission Status; Secondary Review Determination     Admission Date: 3/6/2018 11:54 AM      Under the authority of the Utilization Management Committee, the utilization review process indicated a secondary review on the above patient.  The review outcome is based on review of the medical records, discussions with staff, and applying clinical experience noted on the date of the review.        ()      Inpatient Status Appropriate - This patient's medical care is consistent with medical management for inpatient care and reasonable inpatient medical practice.      (x) Observation Status Appropriate - This patient does not meet hospital inpatient criteria and is placed in observation status. If this patient's primary payer is Medicare and was admitted as an inpatient, Condition Code 44 should be used and patient status changed to \"observation\".   () Admission Status NOT Appropriate - This patient's medical care is not consistent with medical management for Inpatient or Observation Status.          RATIONALE FOR DETERMINATION   Patient is a 37-year-old male with a history of congenital heart disease, atrial flutter, cirrhosis, hypertension, thyroid cancer, hypothyroidism, liver transplant, and kidney transplant.  He presented to the hospital with atrial flutter.  He is placed in the hospital with plan for NEELIMA cardioversion.  He was initially placed in the hospital on inpatient status.  However, observation status at the hospital while awaiting his NEELIMA cardioversion is appropriate hospital status.      The severity of illness, intensity of service provided, expected LOS and risk for adverse outcome make the care appropriate for observation level care at the hospital.        The information on this document is developed by the utilization review team in order for the business office to ensure compliance.  This only denotes the appropriateness of proper admission status and does not reflect the quality of care rendered.   "       The definitions of Inpatient Status and Observation Status used in making the determination above are those provided in the CMS Coverage Manual, Chapter 1 and Chapter 6, section 70.4.      Sincerely,     Oni Christianson D.O.  Utilization Review/ Case Management  [facility].

## 2018-03-07 NOTE — PROGRESS NOTES
Care Coordinator- Discharge Planning     Admission Date/Time:  3/6/2018  Attending MD:  Jaylen George MD   Data  Chart reviewed, discussed with interdisciplinary team.   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  1. Atrial flutter, unspecified type (H)    Pt is now s/p successful direct current cardioversion to sinus rhythm. Discharging on Apixaban for anticoagulation.   Assessment  Concerns with insurance coverage for discharge needs: Pt is Observation Status with cardiac monitoring, pt is aware of his status, pt given Observation Info sheet.   Current Living Situation: Patient said that he lives with his parents.   Support System: Supportive and Involved parents.   Services Involved: Transplant Coordinator  Transportation: Family or Friend will provide    Coordination of Care and Referrals: No home care needs per pt.   Pt said that he has his port-a-cath flushed after lab draws at the clinic. Pt added that his Oncology Coordinator told him to have his port-a-cath flushed at least every 60 days and pt said that he does this.     Plan  Anticipated Discharge Date:  3/7/1/  Anticipated Discharge Plan:  Discharge to home with clinic f/u.     CTS Handoff completed:  YES    EDYTA HICKS RN BSN  Care Coordinator  [phone]

## 2018-03-07 NOTE — ANESTHESIA POSTPROCEDURE EVALUATION
Patient: Cricket Chen    Procedure(s):  Cardioversion    Diagnosis:Atrial Fibrillation  Diagnosis Additional Information: No value filed.    Anesthesia Type:  MAC    Note:  Anesthesia Post Evaluation    Patient location during evaluation: Phase 2  Patient participation: Able to fully participate in evaluation  Level of consciousness: awake  Pain management: adequate  Airway patency: patent  Cardiovascular status: acceptable  Respiratory status: acceptable  Hydration status: acceptable  PONV: none     Anesthetic complications: None          Last vitals:  Vitals:    03/07/18 1016 03/07/18 1019 03/07/18 1023   BP: 102/70 102/66 105/71   Pulse: 62 63 73   Resp: 16 16 16   Temp:      SpO2: 97% 96% 94%         Electronically Signed By: Andrea Rubio MD  March 7, 2018  10:38 AM

## 2018-03-07 NOTE — PLAN OF CARE
Problem: Patient Care Overview  Goal: Plan of Care/Patient Progress Review  RN  1. Pt will be hemodynamically stable.   Outcome: No Change  Admission Note    D: Pt admitted to 6C from ED due to A. Flutter. Hx of A. Flutter and past cardioversion.     I: Oriented pt to room and unit protocols. Monitored and assessed pt throughout shift. Completed pt admission profile.     A: Pt alert and oriented x4. Pt in A. Flutter with HR in 110s. Other vitals stables. Pt voiding and up independently.     P: NEELIMA/cardioversion scheduled for AM 3/7/18. Keep pt NPO after midnight for pre-procedure protocol. Continue to monitor and report to Cards 1 if any changes.

## 2018-03-07 NOTE — ANESTHESIA CARE TRANSFER NOTE
Patient: Cricket Chen    Procedure(s):  Cardioversion    Diagnosis: Atrial Fibrillation  Diagnosis Additional Information: No value filed.    Anesthesia Type:   MAC     Note:  Airway :Nasal Cannula  Destination: Echo/EKG department.  Comments: Pt awakens easily to verbal stimuli. Stable, SR. Report to RN at bedside.       Vitals: (Last set prior to Anesthesia Care Transfer)    CRNA VITALS  3/7/2018 0951 - 3/7/2018 1021      3/7/2018             SpO2: 97 %    EKG: Sinus rhythm                Electronically Signed By: DARYN Marina CRNA  March 7, 2018  10:21 AM

## 2018-03-07 NOTE — OP NOTE
CARDIOVERSION        PROCEDURE:  direct current cardioversion  PROCEDURE DATE: 03/07/18    Pre-procedure diagnosis:  Atrial flutter  Post-procedure diagnosis: Successful direct current cardioversion to sinus rhythm.  Complications:  none    BRIEF CLINICAL HISTORY:  Mr. Chen is a 26 yo male with paroxysmal atrial flutter and a history of TGA s/p baffle repair in 1981 who was hospitalized with symptomatic atrial flutter (dyspnea) and referred for cardioversion.  He just underwent a NEELIMA which showed no intracardiac thrombus.  He has been started on anticoagulation with apixaban.     PROCEDURE:  The patient arrived at the Echo Laboratory in a fasting, non-sedated state.  Informed consent was previously obtained from patient, who understood indications, risks, and benefits of the procedure.  He had received his last dose of apixaban 5 mg this morning.  Transesophageal echo was performed by the echo lab team to rule out intracardiac thrombus.  With help from Anesthesia, patient was deeply sedated.  100J of synchronized biphasic shock was delivered through the cardiac monitor, and the patient was successfully converted to normal sinus rhythm.  After sedation wore off, patient awoke and remained neurologically intact.  The patient tolerated the procedure well from a hemodynamic and respiratory standpoint without any complications.    IMPRESSION:  1.  Successful direct current cardioversion with 100J biphasic shock.    RECOMMENDATIONS/PLANS:  1.   Return to the cardiology 1 service for further management.  2.   Continue apixaban 5 mg BID for at least 4 weeks post-cardioversion    Dr. Abdias Turner was available throughout the procedure.    Reginald Eden MD  Cardiovascular disease fellow    EP Staff  I was available but not present in the room at the time of cardioversion (I was staffing another procedure) so I have not submitted a physician charge.  Abdias Turner

## 2018-03-08 ENCOUNTER — CARE COORDINATION (OUTPATIENT)
Dept: CARE COORDINATION | Facility: CLINIC | Age: 38
End: 2018-03-08

## 2018-03-08 ENCOUNTER — TELEPHONE (OUTPATIENT)
Dept: FAMILY MEDICINE | Facility: CLINIC | Age: 38
End: 2018-03-08

## 2018-03-08 LAB — INTERPRETATION ECG - MUSE: NORMAL

## 2018-03-08 NOTE — TELEPHONE ENCOUNTER
Hospital discharge summary states patient is to follow up with Dr. Alba at [facility]   Erika Lloyd assessed patient due to sore throat back in 2016.  Will hold TE open until patient schedules follow up with Dr. Alba, if has not scheduled by Friday afternoon will call patient.      Julissa Charles RN  [facility]

## 2018-03-08 NOTE — TELEPHONE ENCOUNTER
This patient was discharged from [facility] on 03/07/2018.    Discharge Diagnosis: Atrial Flutter, Unspecified Type (H)    Follow-up instructions:   Follow up with Dr. Alba (Congenital cardiology) 2-4 weeks, at [facility]  for hospital follow- up and to transfer care to [facility]. No follow up labs or test are needed.    A follow-up visit has not been scheduled.      Please follow-up with patient.

## 2018-03-09 ENCOUNTER — CARE COORDINATION (OUTPATIENT)
Dept: CARDIOLOGY | Facility: CLINIC | Age: 38
End: 2018-03-09

## 2018-03-09 DIAGNOSIS — Z94.0 KIDNEY REPLACED BY TRANSPLANT: ICD-10-CM

## 2018-03-09 DIAGNOSIS — Q20.3 TGA (TRANSPOSITION OF GREAT ARTERIES): Primary | ICD-10-CM

## 2018-03-09 LAB
TACROLIMUS BLD-MCNC: 4.5 UG/L (ref 5–15)
TME LAST DOSE: 2200 H

## 2018-03-09 PROCEDURE — 36415 COLL VENOUS BLD VENIPUNCTURE: CPT | Performed by: INTERNAL MEDICINE

## 2018-03-09 PROCEDURE — 80197 ASSAY OF TACROLIMUS: CPT | Performed by: INTERNAL MEDICINE

## 2018-03-09 NOTE — TELEPHONE ENCOUNTER
Nurse sees prescriptions have been filled by Laureen Wing - routing to this MD  For hospital follow up.    Julissa Charles RN  [facility]

## 2018-03-12 ASSESSMENT — ENCOUNTER SYMPTOMS
ABDOMINAL PAIN: 0
FEVER: 0
PALPITATIONS: 1
SHORTNESS OF BREATH: 1

## 2018-03-13 NOTE — ED PROVIDER NOTES
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       I have reviewed the Medications, Allergies, Past Medical and Surgical History, and Social History in the Epic system.    Review of Systems   Constitutional: Negative for fever.   Respiratory: Positive for shortness of breath.    Cardiovascular: Positive for chest pain and palpitations.   Gastrointestinal: Negative for abdominal pain.   All other systems reviewed and are negative.      Physical Exam   BP: 146/87  Pulse: 110  Heart Rate: 110  Temp: 97.8  F (36.6  C)  Resp: 20  Height: 177.8 cm (5' 10\")  Weight: 94.8 kg (208 lb 14.4 oz)  SpO2: 100 %      Physical Exam   Constitutional: No distress.   HENT:   Head: Atraumatic.   Mouth/Throat: Oropharynx is clear and moist. No oropharyngeal exudate.   Eyes: Pupils are equal, round, and reactive to light. No scleral icterus.   Cardiovascular: Normal heart sounds and intact distal pulses.    Irregular, bradycardic    Pulmonary/Chest: Breath sounds normal. No respiratory distress.   Abdominal: Soft. Bowel sounds are normal. There is no tenderness.   Musculoskeletal: He exhibits no edema or tenderness.   Skin: Skin is warm. No rash noted. He is not diaphoretic.       ED Course     ED Course     Procedures             EKG Interpretation:      Interpreted by Isabella Cruz  Time reviewed: per Epic  Symptoms at time of EKG: None   Rhythm: atrial flutter  Rate: 50-60  Axis: Normal  Ectopy: none  Conduction: normal  ST Segments/ T Waves: Non-specific ST-T wave changes  Q Waves: nonspecific  Comparison to prior: No old EKG available    Clinical Impression: atrial " flutter (chronic) and atrial flutter (new onset)                             Labs Ordered and Resulted from Time of ED Arrival Up to the Time of Departure from the ED   CBC WITH PLATELETS DIFFERENTIAL - Abnormal; Notable for the following:        Result Value    WBC 3.5 (*)     Platelet Count 126 (*)     Absolute Lymphocytes 0.4 (*)     All other components within normal limits   COMPREHENSIVE METABOLIC PANEL - Abnormal; Notable for the following:     Glucose 100 (*)     Creatinine 1.36 (*)     GFR Estimate 59 (*)     All other components within normal limits   INR - Abnormal; Notable for the following:     INR 1.18 (*)     All other components within normal limits   TSH WITH FREE T4 REFLEX - Abnormal; Notable for the following:     TSH 0.12 (*)     All other components within normal limits   T4 FREE - Abnormal; Notable for the following:     T4 Free 1.87 (*)     All other components within normal limits   LIPASE   TROPONIN I   PARTIAL THROMBOPLASTIN TIME   D DIMER QUANTITATIVE   MAGNESIUM            Assessments & Plan (with Medical Decision Making)     37 year old male with history of complete transposition of the great vessels s/p  repair in 1981, atrial flutter, liver and kidney transplant, papillary thyroid cancer s/p thryoidectomy who presents to the ED with shortness of breath, chest discomfort and palpitations for 1 d. IV established and labs sent, and remarkable for TSH 0.12, Free T4 1.87. Patient placed on cardiac monitor which revealed A flutter with rate in 60s. Rhythm confirmed by EKG. Patient given 1 L NS IV and call placed to Cardiology with plans for admission.      I have reviewed the nursing notes.    I have reviewed the findings, diagnosis, plan and need for follow up with the patient.    Discharge Medication List as of 3/7/2018  1:09 PM      START taking these medications    Details   apixaban ANTICOAGULANT (ELIQUIS) 5 MG tablet Take 1 tablet (5 mg) by mouth 2 times daily, Disp-30 tablet, R-1,  E-Prescribe             Final diagnoses:   Atrial flutter, unspecified type (H)       3/6/2018   UNIT 6C [facility]     Isabella Cruz MD  03/12/18 2362       Isabella Cruz MD  03/15/18 0253

## 2018-03-20 ENCOUNTER — DOCUMENTATION ONLY (OUTPATIENT)
Dept: LAB | Facility: CLINIC | Age: 38
End: 2018-03-20

## 2018-03-20 DIAGNOSIS — Z94.4 LIVER TRANSPLANTED (H): ICD-10-CM

## 2018-03-20 DIAGNOSIS — Z79.899 OTHER LONG TERM (CURRENT) DRUG THERAPY: ICD-10-CM

## 2018-03-20 DIAGNOSIS — Z94.4 LIVER REPLACED BY TRANSPLANT (H): ICD-10-CM

## 2018-03-20 DIAGNOSIS — Z94.0 KIDNEY REPLACED BY TRANSPLANT: ICD-10-CM

## 2018-03-20 DIAGNOSIS — Z94.0 KIDNEY TRANSPLANTED: ICD-10-CM

## 2018-03-20 LAB
ANION GAP SERPL CALCULATED.3IONS-SCNC: 9 MMOL/L (ref 3–14)
BUN SERPL-MCNC: 20 MG/DL (ref 7–30)
CALCIUM SERPL-MCNC: 9.5 MG/DL (ref 8.5–10.1)
CHLORIDE SERPL-SCNC: 107 MMOL/L (ref 94–109)
CO2 SERPL-SCNC: 25 MMOL/L (ref 20–32)
CREAT SERPL-MCNC: 1.28 MG/DL (ref 0.66–1.25)
ERYTHROCYTE [DISTWIDTH] IN BLOOD BY AUTOMATED COUNT: 13.7 % (ref 10–15)
GFR SERPL CREATININE-BSD FRML MDRD: 63 ML/MIN/1.7M2
GLUCOSE SERPL-MCNC: 96 MG/DL (ref 70–99)
HCT VFR BLD AUTO: 45.6 % (ref 40–53)
HGB BLD-MCNC: 15.5 G/DL (ref 13.3–17.7)
MCH RBC QN AUTO: 30.2 PG (ref 26.5–33)
MCHC RBC AUTO-ENTMCNC: 34 G/DL (ref 31.5–36.5)
MCV RBC AUTO: 89 FL (ref 78–100)
PLATELET # BLD AUTO: 113 10E9/L (ref 150–450)
POTASSIUM SERPL-SCNC: 4.1 MMOL/L (ref 3.4–5.3)
RBC # BLD AUTO: 5.14 10E12/L (ref 4.4–5.9)
SODIUM SERPL-SCNC: 141 MMOL/L (ref 133–144)
TACROLIMUS BLD-MCNC: 3.8 UG/L (ref 5–15)
TME LAST DOSE: ABNORMAL H
WBC # BLD AUTO: 2.7 10E9/L (ref 4–11)

## 2018-03-20 PROCEDURE — 80197 ASSAY OF TACROLIMUS: CPT | Performed by: INTERNAL MEDICINE

## 2018-03-20 PROCEDURE — 80048 BASIC METABOLIC PNL TOTAL CA: CPT | Performed by: INTERNAL MEDICINE

## 2018-03-20 PROCEDURE — 87799 DETECT AGENT NOS DNA QUANT: CPT | Performed by: INTERNAL MEDICINE

## 2018-03-20 PROCEDURE — 85027 COMPLETE CBC AUTOMATED: CPT | Performed by: INTERNAL MEDICINE

## 2018-03-20 PROCEDURE — 36415 COLL VENOUS BLD VENIPUNCTURE: CPT | Performed by: INTERNAL MEDICINE

## 2018-03-20 NOTE — PROGRESS NOTES
Patient has a lab appointment today 3/20/18. Patient has some test as Sign & Held in chart ( tacrolimus, digoxin level, potassium, magnesium ) and INR. Are these tests needed today 3/20/18?

## 2018-03-21 LAB
BKV DNA # SPEC NAA+PROBE: ABNORMAL COPIES/ML
BKV DNA SPEC NAA+PROBE-LOG#: 4.3 LOG COPIES/ML
SPECIMEN SOURCE: ABNORMAL

## 2018-03-30 ENCOUNTER — RADIANT APPOINTMENT (OUTPATIENT)
Dept: CARDIOLOGY | Facility: CLINIC | Age: 38
End: 2018-03-30
Payer: COMMERCIAL

## 2018-03-30 ENCOUNTER — OFFICE VISIT (OUTPATIENT)
Dept: CARDIOLOGY | Facility: CLINIC | Age: 38
End: 2018-03-30
Attending: INTERNAL MEDICINE
Payer: COMMERCIAL

## 2018-03-30 VITALS
BODY MASS INDEX: 29.86 KG/M2 | DIASTOLIC BLOOD PRESSURE: 84 MMHG | SYSTOLIC BLOOD PRESSURE: 126 MMHG | OXYGEN SATURATION: 97 % | WEIGHT: 208.6 LBS | HEART RATE: 64 BPM | HEIGHT: 70 IN

## 2018-03-30 DIAGNOSIS — Q20.3 COMPLETE TRANSPOSITION OF GREAT VESSELS: Primary | ICD-10-CM

## 2018-03-30 DIAGNOSIS — I48.92 ATRIAL FLUTTER, UNSPECIFIED TYPE (H): ICD-10-CM

## 2018-03-30 DIAGNOSIS — Q20.3 TGA (TRANSPOSITION OF GREAT ARTERIES): ICD-10-CM

## 2018-03-30 PROCEDURE — 99215 OFFICE O/P EST HI 40 MIN: CPT | Mod: GC | Performed by: INTERNAL MEDICINE

## 2018-03-30 PROCEDURE — G0463 HOSPITAL OUTPT CLINIC VISIT: HCPCS | Mod: 25,ZF

## 2018-03-30 PROCEDURE — 93005 ELECTROCARDIOGRAM TRACING: CPT | Mod: ZF

## 2018-03-30 PROCEDURE — 93010 ELECTROCARDIOGRAM REPORT: CPT | Mod: ZP | Performed by: INTERNAL MEDICINE

## 2018-03-30 ASSESSMENT — PAIN SCALES - GENERAL: PAINLEVEL: NO PAIN (0)

## 2018-03-30 NOTE — MR AVS SNAPSHOT
"              After Visit Summary   3/30/2018    Cricket Chen    MRN: [id number]           Patient Information     Date Of Birth          1980        Visit Information        Provider Department      3/30/2018 11:00 AM Francesca Alba MD [facility]        Today's Diagnoses     Complete transposition of great vessels    -  1    Atrial flutter, unspecified type (H)          Care Instructions    You were seen today in the Adult Congenital and Cardiovascular Genetics Clinic at the [facility].    Cardiology Providers you saw during your visit:  Dr Francesca Alba    Diagnosis:  History of TGA,  A flutter    Results:  Dr Alba reviewed the results of your echo and EKG with you in clinic today    Recommendations:    1.  Continue to eat a heart healthy, low salt diet.  2.  Continue to get 20-30 minutes of aerobic activity, 4-5 days per week.  Examples of aerobic activity include walking, running, swimming, cycling, etc.  3.  Continue to observe good oral hygiene, with regular dental visits.  4.  We will schedule you for a cardiopulmonary stress test- For this, nothing to eat or drink 3 hours prior.  No caffeine, alcohol, or tobacco 12 hours prior.  5.  I will send in a new prescription for your Eliquis     Vitals:    03/30/18 1036   BP: 126/84   Pulse: 64   SpO2: 97%   Weight: 94.6 kg (208 lb 9.6 oz)   Height: 1.778 m (5' 10\")       SBE prophylaxis:   Yes____  No__x__    Lifelong Bacterial Endocarditis Prophylaxis:  YES____  NO____    If YES is checked, follow the recommendations outlined below:   1. Take antibiotic(s) prior to recommended dental procedures and procedures on the respiratory tract or with infected skin, muscle or bones. SBE prophylaxis is not needed for routine GI and  procedures (ie. Colonoscopy or vaginal delivery)   2. Observe good oral hygiene daily, as advised by your dentist. Get regular professional dental care.   3. Keep cuts clean.   4. Infections should be treated " promptly.   5. Symptoms of Infective Endocarditis could include: fever lasting more than 4-5 days or a recurrent fever that initially resolves but returns within 1-2 days)     Exercise restrictions:   Yes____  No__x__         If yes, list restrictions:  Must be allowed to rest if fatigued or SOB      Work restrictions:  Yes____  No__x__         If yes, list restrictions:    FASTING CHOLESTEROL was checked in the last 5 years YES_x__  NO___  2014  Continue to eat a heart healthy, low salt diet.         ____ Fasting lipid panel order today         ____ No changes in medications          ____ I recommend the following changes in your cholesterol medications.:          ____ Please follow up for cholesterol screening at your primary care physician      Follow-up:  Follow up with Dr Alba in one year with an echo    For after hours urgent needs, call [phone] and ask to speak to the Adult Congenital Physician on call.  Mention Job Code 0401.    For emergencies call 911.    For any scheduling needs, please call Brandan Hall, Procedure , at [phone]  Thank you for your visit today!  If you have questions or concerns about today's visit, please call me.    Gagandeep Corado RN, BSN  Cardiology Care Coordinator  [facility]  [phone]    [address] [place] [address]  Mail Code [id number]  [place] [address]            Follow-ups after your visit        Your next 10 appointments already scheduled     Apr 03, 2018 11:15 AM CDT   LAB with [facility]   [facility]    [address] [facility] [id number] [phone]           Please do not eat 10-12 hours before your appointment if you are coming in fasting for labs on lipids, cholesterol, or glucose (sugar). This does not apply to pregnant women. Water, hot tea and black coffee (with nothing added) are okay. Do not drink other fluids, diet soda or chew gum.            Apr 13, 2018 10:30 AM  CDT   Card Cardpul Stress Tst Adult with [facility]   [facility] ([facility]    [address]  [facility] [address]               Apr 13, 2018  1:00 PM CDT   (Arrive by 12:45 PM)   NEW CONGENITAL HEART with Jaylen George MD   [facility] ([facility])    [address]  [address]  [facility] [place] [address]   [phone]            Aug 06, 2018  8:30 AM CDT   [facility] Lab Draw with  [facility] LAB DRAW   [facility] Lab Draw ([facility])    [address]  [address]  [facility] [place] [address]   [phone]            Aug 06, 2018  9:00 AM CDT   (Arrive by 8:45 AM)   Return Visit with Madhav Moody MD   [facility] ([facility])    [address]  [address]  [facility] [place] [address]   [phone]            Aug 06, 2018  2:20 PM CDT   (Arrive by 1:50 PM)   Return Kidney Transplant with Jake Sidhu MD   [facility] Nephrology ([facility])    [address]  [address]  [facility] [place] [id number] [phone]            Aug 10, 2018  1:50 PM CDT   (Arrive by 1:35 PM)   RETURN ENDOCRINE with Zuleyma Gray MD   [facility] Endocrinology ([facility])    [address]  3rd Floor  [facility] [place] [address]   [phone]            George 15, 2019 12:10 PM CST   (Arrive by 11:55 AM)   Return Liver Transplant with Laureen Galvan MD   [facility] Hepatology ([facility])    [address]  [address]  [place] [place] [address] [phone]            George 15, 2019  1:05 PM CST   (Arrive by 12:35 PM)   Return Kidney Transplant with Jake Sidhu MD   [facility] ([facility])    [address]  [address]  [facility] [place] [address]   [phone]              Future tests that were ordered  "for you today     Open Future Orders        Priority Expected Expires Ordered    Radiologist Consult For Cardiology Routine 3/30/2018 3/30/2019 3/30/2018    Card Cardiopulmonary Stress Test - Adult Routine 4/13/2018 3/31/2019 3/30/2018            Who to contact     If you have questions or need follow up information about today's clinic visit or your schedule please contact [facility] directly at [phone].  Normal or non-critical lab and imaging results will be communicated to you by [employer], letter or phone within 4 business days after the clinic has received the results. If you do not hear from us within 7 days, please contact the clinic through [employer] [facility] [employer] [facility] or phone. If you have a critical or abnormal lab result, we will notify you by phone as soon as possible.  Submit refill requests through [employer] [facility] [employer] [facility] or call your pharmacy and they will forward the refill request to us. Please allow 3 business days for your refill to be completed.          Additional Information About Your Visit        [employer] Information     [facility] [employer] [facility] gives you secure access to your electronic health record. If you see a primary care provider, you can also send messages to your care team and make appointments. If you have questions, please call your primary care clinic.  If you do not have a primary care provider, please call [phone] and they will assist you.        Care EveryWhere ID     This is your Care EveryWhere ID. This could be used by other organizations to access your [facility] medical records  [id number]        Your Vitals Were     Pulse Height Pulse Oximetry BMI (Body Mass Index)          64 1.778 m (5' 10\") 97% 29.93 kg/m2         Blood Pressure from Last 3 Encounters:   03/30/18 126/84   03/07/18 106/73   02/09/18 122/80    Weight from Last 3 Encounters:   03/30/18 94.6 kg (208 lb 9.6 oz)   03/07/18 93.1 kg (205 lb 4.8 oz)   02/09/18 97 kg (213 lb 12.8 oz)              We Performed the Following     EKG 12-lead, tracing " only (Same Day)          Where to get your medicines      These medications were sent to [facility] - [place], [place] - [address]  [address] , [facility] [address]    Hours:  Mon-Fri 8:30am-5:00pm Toll Free [phone] Phone:  [phone]     apixaban ANTICOAGULANT 5 MG tablet          Primary Care Provider    [facility]       No address on file        Equal Access to Services     CLAY VIDES : Hadii aad ku hadasho Soomaali, waaxda luqadaha, qaybta kaalmada adeegyada, waxay gladisin haymirellan kostas white . So [facility] [phone].    ATENCIÓN: Si habla español, tiene a laura disposición servicios gratuitos de asistencia lingüística. [facility] [phone].    We comply with applicable federal civil rights laws and [place] laws. We do not discriminate on the basis of race, color, national origin, age, disability, sex, sexual orientation, or gender identity.            Thank you!     Thank you for choosing [facility]  for your care. Our goal is always to provide you with excellent care. Hearing back from our patients is one way we can continue to improve our services. Please take a few minutes to complete the written survey that you may receive in the mail after your visit with us. Thank you!             Your Updated Medication List - Protect others around you: Learn how to safely use, store and throw away your medicines at www.disposemymeds.org.          This list is accurate as of 3/30/18 12:21 PM.  Always use your most recent med list.                   Brand Name Dispense Instructions for use Diagnosis    amLODIPine 10 MG tablet    NORVASC    90 tablet    TAKE ONE TABLET BY MOUTH EVERY DAY    HTN (hypertension)       apixaban ANTICOAGULANT 5 MG tablet    ELIQUIS    60 tablet    Take 1 tablet (5 mg) by mouth 2 times daily    Atrial flutter, unspecified type (H)       levothyroxine 150 MCG tablet    SYNTHROID/LEVOTHROID    90 tablet     Take 1 tablet (150 mcg) by mouth daily    Papillary thyroid carcinoma (H)       lisinopril 5 MG tablet    PRINIVIL/ZESTRIL    90 tablet    TAKE ONE TABLET BY MOUTH EVERY DAY    HTN (hypertension)       magnesium oxide 400 (241.3 MG) MG tablet    MAG-OX    120 tablet    Take 1 tablet (400 mg) by mouth 2 times daily    Hypomagnesemia       mycophenolate 250 MG capsule    GENERIC EQUIVALENT    60 capsule    Take 1 capsule (250 mg) by mouth 2 times daily    Kidney replaced by transplant       predniSONE 5 MG tablet    DELTASONE    90 tablet    Take 1 tablet (5 mg) by mouth daily    Liver replaced by transplant (H), S/P kidney transplant       sulfamethoxazole-trimethoprim 400-80 MG per tablet    BACTRIM/SEPTRA    30 tablet    Take 1 tablet by mouth daily    Liver replaced by transplant (H), S/P kidney transplant       * tacrolimus 1 MG capsule    GENERIC EQUIVALENT    60 capsule    Take 1 capsule (1 mg) by mouth 2 times daily 1.5 mg twice per day.    Liver replaced by transplant (H), S/P kidney transplant       * tacrolimus 0.5 MG capsule    GENERIC EQUIVALENT    60 capsule    Take 1 capsule (0.5 mg) by mouth 2 times daily 1.5 mg twice per day.    S/P kidney transplant       * Notice:  This list has 2 medication(s) that are the same as other medications prescribed for you. Read the directions carefully, and ask your doctor or other care provider to review them with you.

## 2018-03-30 NOTE — PROGRESS NOTES
HPI:  Mr. 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      He presents today to clinic following a recent admission to the hospital for atrial flutter for which he felt palpitations, fatigue, and BURNS and under successful cardioversion following unremarkable NEELIMA.  He wishes to transition his care from Dr. Emile Calvo given that the remainder of his care is at this institution.  He is otherwise doing well without any new concerns.  He has been healthy other than his recent hospitalization.  He denies any chest pain, SOB, palpitations, syncope, BURNS, orthopnea, or PND.  He does not exercise significantly but wishes to begin now that the weather is improving.         Past Medical History:   Diagnosis Date     Alcohol abuse     Last drink in Mid-April 2014     Anemia in ESRD (end-stage renal disease) (H)      Anxiety 2008     Atrial flutter (H)      Cirrhosis (H)     S/P liver transplant     Depression      History of blood transfusion      History of transposition of great vessels     atrial switch at age 8 months old     Hypertension      Liver transplant recipient (H)     2016     Papillary thyroid carcinoma (H)      Pneumonia 11-15-14     Renal transplant recipient     2016     Varices, esophageal (H)        Past Surgical History:   Procedure Laterality Date     ANESTHESIA CARDIOVERSION N/A 3/7/2018    Procedure: ANESTHESIA CARDIOVERSION;  Cardioversion;  Surgeon: GENERIC ANESTHESIA PROVIDER;  Location: [facility]     BENCH LIVER N/A 5/10/2016    Procedure: BENCH LIVER;  Surgeon: Ricky Deshpande MD;  [facility]: [facility]     BIOPSY LYMPH NODE CERVICAL Right 1/26/2017    Procedure: BIOPSY LYMPH  NODE CERVICAL;  Surgeon: Beka Soto MD;  [facility]: [facility]     CARDIAC SURGERY  9-10-80     CREATE FISTULA ARTERIOVENOUS UPPER EXTREMITY Right 9/16/2014    Procedure: CREATE FISTULA ARTERIOVENOUS UPPER EXTREMITY;  Surgeon: Padmaja Eaton MD;  [facility]: [facility]     CYSTOSCOPY, REMOVE STENT(S), COMBINED Right 6/22/2016    Procedure: COMBINED CYSTOSCOPY, REMOVE STENT(S);  Surgeon: Ricky Deshpande MD;  [facility]     ENT SURGERY       ESOPHAGOSCOPY, GASTROSCOPY, DUODENOSCOPY (EGD), COMBINED  5/30/2014    Procedure: COMBINED ESOPHAGOSCOPY, GASTROSCOPY, DUODENOSCOPY (EGD);  Surgeon: Guillaume Bautista MD;  [facility]     ESOPHAGOSCOPY, GASTROSCOPY, DUODENOSCOPY (EGD), COMBINED  9/30/14     ESOPHAGOSCOPY, GASTROSCOPY, DUODENOSCOPY (EGD), COMBINED Left 3/12/2015    Procedure: COMBINED ESOPHAGOSCOPY, GASTROSCOPY, DUODENOSCOPY (EGD);  Surgeon: Laureen Galvan MD;  [facility]: [facility]     ESOPHAGOSCOPY, GASTROSCOPY, DUODENOSCOPY (EGD), COMBINED N/A 4/21/2016    Procedure: COMBINED ESOPHAGOSCOPY, GASTROSCOPY, DUODENOSCOPY (EGD);  Surgeon: Laureen Galvan MD;  [facility]     ESOPHAGOSCOPY, GASTROSCOPY, DUODENOSCOPY (EGD), COMBINED N/A 7/21/2016    Procedure: COMBINED ESOPHAGOSCOPY, GASTROSCOPY, DUODENOSCOPY (EGD);  Surgeon: Laureen Galvan MD;  [facility]     [facility] OR CATH ABLATION NON-CARDIAC ENDOVASCULAR  1990,2002    SVT     INSERT PORT VASCULAR ACCESS N/A 4/3/2017    Procedure: INSERT PORT VASCULAR ACCESS;  Surgeon: Rajendra Jacques PA-C;  [facility]     LIGATE FISTULA ARTERIOVENOUS UPPER EXTREMITY Right 11/7/2017    Procedure: LIGATE FISTULA ARTERIOVENOUS UPPER EXTREMITY;  Revision of Right Arm Arteriovenous Fistula ;  Surgeon: Padmaja Eaton MD;  Location: [facility]     PICC INSERTION  5/30/14    PICC line placement 5/30/14; Removal 6/9/2014     THORACIC SURGERY  1980    Transposition great arteries, repaired at 8 months     THYROIDECTOMY Right 8/7/2017     "Procedure: THYROIDECTOMY;  Bilateral Total Thyroidectomy ;  Surgeon: Beka Soto MD;  Location: [facility]     TRANSPLANT KIDNEY RECIPIENT  DONOR  5/10/2016    Procedure: TRANSPLANT KIDNEY RECIPIENT  DONOR;  Surgeon: Ricky Deshpande MD;  [facility]     TRANSPLANT LIVER RECIPIENT  DONOR N/A 5/10/2016    Procedure: TRANSPLANT LIVER RECIPIENT  DONOR;  Surgeon: Ricky Deshpande MD;  Location:  OR   As above, including HPI    Family History   Problem Relation Age of Onset     Hypertension Brother      Hypertension Sister      Arthritis Father      Hypertension Father      Hypertension Mother      Hypertension Sister      Alcohol/Drug No family hx of      GASTROINTESTINAL DISEASE No family hx of      no fam hx of liver disease or liver cancer       Current Outpatient Prescriptions   Medication     apixaban ANTICOAGULANT (ELIQUIS) 5 MG tablet     sulfamethoxazole-trimethoprim (BACTRIM/SEPTRA) 400-80 MG per tablet     tacrolimus (GENERIC EQUIVALENT) 1 MG capsule     tacrolimus (GENERIC EQUIVALENT) 0.5 MG capsule     mycophenolate (GENERIC EQUIVALENT) 250 MG capsule     lisinopril (PRINIVIL/ZESTRIL) 5 MG tablet     amLODIPine (NORVASC) 10 MG tablet     levothyroxine (SYNTHROID/LEVOTHROID) 150 MCG tablet     magnesium oxide (MAG-OX) 400 (241.3 MG) MG tablet     predniSONE (DELTASONE) 5 MG tablet     No current facility-administered medications for this visit.        Allergies   Allergen Reactions     Hydromorphone Itching     ROS: 12 point ROS neg other than the symptoms noted above in the HPI.    /84  Pulse 64  Ht 5' 10\" (177.8 cm)  Wt 208 lb 9.6 oz (94.6 kg)  SpO2 97%  BMI 29.93 kg/m2  General: appears comfortable, alert and articulate  Head: normocephalic, atraumatic  Eyes: anicteric sclera, EOMI  Neck: no adenopathy or masses, 2+ carotids without bruits  Orophyarynx: moist mucosa, no lesions, dentition intact  Heart: regular, normal S1, loud S2, no murmur, gallop, " or rub, estimated JVP 5 cm  Lungs: clear, no rales or wheezing  Abdomen: soft, non-tender, bowel sounds present, no hepatomegaly  Extremities: no clubbing, cyanosis or edema  Neurological: normal speech and affect, no gross motor deficits    Orders Only on 03/20/2018   Component Date Value Ref Range Status     Sodium 03/20/2018 141  133 - 144 mmol/L Final     Potassium 03/20/2018 4.1  3.4 - 5.3 mmol/L Final     Chloride 03/20/2018 107  94 - 109 mmol/L Final     Carbon Dioxide 03/20/2018 25  20 - 32 mmol/L Final     Anion Gap 03/20/2018 9  3 - 14 mmol/L Final     Glucose 03/20/2018 96  70 - 99 mg/dL Final     Urea Nitrogen 03/20/2018 20  7 - 30 mg/dL Final     Creatinine 03/20/2018 1.28* 0.66 - 1.25 mg/dL Final     GFR Estimate 03/20/2018 63  >60 mL/min/1.7m2 Final    Non  GFR Calc     GFR Estimate If Black 03/20/2018 76  >60 mL/min/1.7m2 Final    African American GFR Calc     Calcium 03/20/2018 9.5  8.5 - 10.1 mg/dL Final     WBC 03/20/2018 2.7* 4.0 - 11.0 10e9/L Final     RBC Count 03/20/2018 5.14  4.4 - 5.9 10e12/L Final     Hemoglobin 03/20/2018 15.5  13.3 - 17.7 g/dL Final     Hematocrit 03/20/2018 45.6  40.0 - 53.0 % Final     MCV 03/20/2018 89  78 - 100 fl Final     MCH 03/20/2018 30.2  26.5 - 33.0 pg Final     MCHC 03/20/2018 34.0  31.5 - 36.5 g/dL Final     RDW 03/20/2018 13.7  10.0 - 15.0 % Final     Platelet Count 03/20/2018 113* 150 - 450 10e9/L Final     Tacrolimus Last Dose 03/20/2018 Not Provided   Final     Tacrolimus Level 03/20/2018 3.8* 5.0 - 15.0 ug/L Final    Comment: Tacrolimus Reference Range  Kidney Transplant  Pediatric                      ug/L    0-3 months post transplant   10-12    3-6 months post transplant   8-10    6-12 months post transplant  6-8    >12 months post transplant   4-7  Adult    0-6 months post transplant   8-10    6-12 months post transplant  6-8    >12 months post transplant   4-6    >5 years post transplant     3-5  Heart Transplant  Pediatric    0-12  months post transplant  10-15    >12 months post transplant   5-10  Adult    0-3 months post transplant   10-15    3-6 months post transplant   8-12    6-12 months post transplant  6-12    >12 months post transplant   6-10  Lung Transplant    0-12 months post transplant  10-15    >12 months post transplant   8-12  Liver Transplant  Pediatric    0-3 months post transplant   10-15    3-6 months post transplant   8-10    >6 months post transplant    6-8  Adult    0-3 months post transplant   10-12    3-6 months post transplant   8-10    >6 months post transplant    6-8  Pancrea                           s Transplant    0-6 months post transplant   8-10    >6 months post transplant    5-8  This test was developed and its performance characteristics determined by the   [facility],  Special Chemistry Laboratory. It has   not been cleared or approved by the FDA. The laboratory is regulated under   CLIA as qualified to perform high-complexity testing. This test is used for   clinical purposes. It should not be regarded as investigational or for   research.       BK Virus Specimen 03/20/2018 Plasma   Final     BK Virus Result 03/20/2018 20013* BKNEG^BK Virus DNA Not Detected copies/mL Final     BK Virus Log 03/20/2018 4.3* <2.7 Log copies/mL Final    Comment: The Real-Time quantitative BK Virus assay was developed and its performance   characteristics determined by the Infectious Diseases Diagnostic Laboratory at   the [facility] in [place], [place]. The   primers and probes for each analyte are Analyte Specific Reagents (ASRs)   manufactured by [employer].  ASRs are used in many laboratory tests necessary for standard medical care and   generally do not require U.S. Food and Drug Administration approval. The FDA   has determined that such clearance or approval is not necessary.  This test is used for clinical purposes. It should not be regarded as   investigational  or for research. This laboratory is certified under the   Clinical Laboratory Improvement Amendments of 1988 (CLIA-88) as qualified to   perform high complexity clinical laboratory testing.       Cardiac MRI 3/9/2017  1. Transposition of the great arteries with native atrioventricular   concordance and ventricular arterial discordance with the aorta anterior   to the pulmonary artery (D-transposition of the great arteries).  2. Status post interatrial baffle with the superior vena cava and inferior   vena cava baffled to the left (subpulmonic) ventricle without obstruction.    There is no evidence of a baffle leak.    3. Systemic right ventricle:  a. The right ventricle is hypertrophied.  b. Decreased systolic function with an ejection fraction of 33%.  c. Severe dilation of the right ventricle with an end-diastolic volume of   208 mL/m  (previously 188 mL/m ) and end-systolic volume of 140 mL/m    (previous 123 mL/m ).  4. The right ventricle connects with the anterior aorta.  The coronary   artery origins are usual for transposition.  Left-sided aortic arch with   measurements above.  5. The pulmonary venous baffle to the right ventricle is widely patent.  6. The subpulmonary left ventricle has normal systolic function with   decreased myocardial mass.  The left ventricle connects to the pulmonary   artery, which is posterior to the aorta.  7. No significant change from previous cardiac MRI of 9/4/2012 except an   increased size in the indexed right ventricular diastolic and systolic   Volumes    Echo (3/30/18):   Patient after atrial switch operation for complete transposition of the great arteries. Technically difficult study due to poor acoustic windows. No baffle obstruction or leaks seen. There is moderate right ventricular enlargement. Single plane right ventricular EF 35-40 %. Normal left ventricular systolic function. No outflow obstruction. Mild tricuspid regurgitation.    EKG (3/30:18): Sinus rhythm,  RAD, RBBB, prominent RV forces with repolarization abnormality.      Assessment and Plan:    Mr. 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    1.  TGA s/p atrial switch now with stably depressed systemic ventricular function:  Although ventricular function is depressed this has been stable for the past several years.  In regard to heart failure medical regimen, his systemic ventricle is an anatomic right ventricle and there is no proven therapies to improve morbidity or mortality in right ventricular failure and especially systemic right ventricular failure.  As he is currently not experiencing any symptoms and does not have any evidence of volume overload on today's exam, thus we will not change his medication regimen at this point in time.  We would like to see him back in 1 year with a repeat echocardiogram at that point in time unless he experiences new symptoms.  We would like for him to undergo a baseline cardiopulmonary exercise test prior to his congenital EP visit, which we will follow up.  Plan for an MRI in 2 years.      2.  H/o atrial flutter s/p ablation: Currently in sinus rhythm, given recent cardioversion will continue apixaban until follow up with congenital EP in 2 weeks.     Yeison Lawson DO  Pediatric Cardiology Fellow      I have reviewed today's vital signs, notes, medications, labs and imaging. I have also seen and examined the patient and agree with the findings and plan as outlined above.    Francesca Alba MD  Section Head - Advanced Heart Failure, Transplantation and Mechanical Circulatory Support  Co-Director - Adult Congenital and Cardiovascular Genetics  [place]  Associate Professor of Medicine, [facility]

## 2018-03-30 NOTE — NURSING NOTE
Cardiac Testing: Patient given instructions regarding  echocardiogram . Discussed purpose, preparation, procedure and when to expect results reported back to the patient. Patient demonstrated understanding of this information and agreed to call with further questions or concerns.    Med Reconcile: Reviewed and verified all current medications with the patient. The updated medication list was printed and given to the patient.    Return Appointment: Follow up with Dr Alba in one year. Patient given instructions regarding scheduling next clinic visit. Patient demonstrated understanding of this information and agreed to call with further questions or concerns.    Patient stated he understood all health information given and agreed to call with further questions or concerns.    Gagandeep Corado, RN  RN Care Coordinator  [facility]  [phone]

## 2018-03-30 NOTE — NURSING NOTE
Chief Complaint   Patient presents with     New Patient     37 year old male with history of D-TGA s/p atrial switch (modified Henrik 4/23/81) and a flutter s/p DCCV presenting for follow up     Vitals were taken and medications were reconciled.     Amira Ward RMA  10:39 AM

## 2018-03-30 NOTE — PATIENT INSTRUCTIONS
"You were seen today in the Adult Congenital and Cardiovascular Genetics Clinic at the [facility].    Cardiology Providers you saw during your visit:  Dr Francesca Alba    Diagnosis:  History of TGA,  A flutter    Results:  Dr Alba reviewed the results of your echo and EKG with you in clinic today    Recommendations:    1.  Continue to eat a heart healthy, low salt diet.  2.  Continue to get 20-30 minutes of aerobic activity, 4-5 days per week.  Examples of aerobic activity include walking, running, swimming, cycling, etc.  3.  Continue to observe good oral hygiene, with regular dental visits.  4.  We will schedule you for a cardiopulmonary stress test- For this, nothing to eat or drink 3 hours prior.  No caffeine, alcohol, or tobacco 12 hours prior.  5.  I will send in a new prescription for your Eliquis     Vitals:    03/30/18 1036   BP: 126/84   Pulse: 64   SpO2: 97%   Weight: 94.6 kg (208 lb 9.6 oz)   Height: 1.778 m (5' 10\")       SBE prophylaxis:   Yes____  No__x__    Lifelong Bacterial Endocarditis Prophylaxis:  YES____  NO____    If YES is checked, follow the recommendations outlined below:   1. Take antibiotic(s) prior to recommended dental procedures and procedures on the respiratory tract or with infected skin, muscle or bones. SBE prophylaxis is not needed for routine GI and  procedures (ie. Colonoscopy or vaginal delivery)   2. Observe good oral hygiene daily, as advised by your dentist. Get regular professional dental care.   3. Keep cuts clean.   4. Infections should be treated promptly.   5. Symptoms of Infective Endocarditis could include: fever lasting more than 4-5 days or a recurrent fever that initially resolves but returns within 1-2 days)     Exercise restrictions:   Yes____  No__x__         If yes, list restrictions:  Must be allowed to rest if fatigued or SOB      Work restrictions:  Yes____  No__x__         If yes, list restrictions:    FASTING CHOLESTEROL was checked in the " last 5 years YES_x__  NO___  2014  Continue to eat a heart healthy, low salt diet.         ____ Fasting lipid panel order today         ____ No changes in medications          ____ I recommend the following changes in your cholesterol medications.:          ____ Please follow up for cholesterol screening at your primary care physician      Follow-up:  Follow up with Dr Alba in one year with an echo    For after hours urgent needs, call [phone] and ask to speak to the Adult Congenital Physician on call.  Mention Job Code [phone].    For emergencies call [phone].    For any scheduling needs, please call [facility] , at [phone]  Thank you for your visit today!  If you have questions or concerns about today's visit, please call me.    Gagandeep Corado RN, BSN  Cardiology Care Coordinator  [facility]  [phone]    [address]  Mail Code [id number]  [place] [address]

## 2018-03-30 NOTE — LETTER
3/30/2018      RE: Cricket Chen  [address]  [place] [address]       Dear Colleague,    Thank you for the opportunity to participate in the care of your patient, Cricket Chen, at the [facility] at [facility]. Please see a copy of my visit note below.    HPI:  Mr. 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      He presents today to clinic following a recent admission to the hospital for atrial flutter for which he felt palpitations, fatigue, and BURNS and under successful cardioversion following unremarkable NEELIMA.  He wishes to transition his care from Dr. Emile Calvo given that the remainder of his care is at this institution.  He is otherwise doing well without any new concerns.  He has been healthy other than his recent hospitalization.  He denies any chest pain, SOB, palpitations, syncope, BURNS, orthopnea, or PND.  He does not exercise significantly but wishes to begin now that the weather is improving.         Past Medical History:   Diagnosis Date     Alcohol abuse     Last drink in Mid-April 2014     Anemia in ESRD (end-stage renal disease) (H)      Anxiety 2008     Atrial flutter (H)      Cirrhosis (H)     S/P liver transplant     Depression      History of blood transfusion      History of transposition of great vessels     atrial switch at age 8 months old     Hypertension      Liver transplant recipient (H)     2016     Papillary thyroid carcinoma (H)      Pneumonia 11-15-14     Renal transplant recipient     2016     Varices, esophageal (H)        Past Surgical History:   Procedure Laterality Date     ANESTHESIA  CARDIOVERSION N/A 3/7/2018    Procedure: ANESTHESIA CARDIOVERSION;  Cardioversion;  Surgeon: GENERIC ANESTHESIA PROVIDER;  Location: [facility] OR     BENCH LIVER N/A 5/10/2016    Procedure: BENCH LIVER;  Surgeon: Ricky Deshpande MD;  [facility]     BIOPSY LYMPH NODE CERVICAL Right 1/26/2017    Procedure: BIOPSY LYMPH NODE CERVICAL;  Surgeon: Beka Soto MD;  [facility]     CARDIAC SURGERY  9-10-80     CREATE FISTULA ARTERIOVENOUS UPPER EXTREMITY Right 9/16/2014    Procedure: CREATE FISTULA ARTERIOVENOUS UPPER EXTREMITY;  Surgeon: Padmaja Eaton MD;  [facility]     CYSTOSCOPY, REMOVE STENT(S), COMBINED Right 6/22/2016    Procedure: COMBINED CYSTOSCOPY, REMOVE STENT(S);  Surgeon: Ricky Deshpande MD;  [facility]     ENT SURGERY       ESOPHAGOSCOPY, GASTROSCOPY, DUODENOSCOPY (EGD), COMBINED  5/30/2014    Procedure: COMBINED ESOPHAGOSCOPY, GASTROSCOPY, DUODENOSCOPY (EGD);  Surgeon: Guillaume Bautista MD;  Location:  GI     ESOPHAGOSCOPY, GASTROSCOPY, DUODENOSCOPY (EGD), COMBINED  9/30/14     ESOPHAGOSCOPY, GASTROSCOPY, DUODENOSCOPY (EGD), COMBINED Left 3/12/2015    Procedure: COMBINED ESOPHAGOSCOPY, GASTROSCOPY, DUODENOSCOPY (EGD);  Surgeon: Laureen Galvan MD;  Location:  GI     ESOPHAGOSCOPY, GASTROSCOPY, DUODENOSCOPY (EGD), COMBINED N/A 4/21/2016    Procedure: COMBINED ESOPHAGOSCOPY, GASTROSCOPY, DUODENOSCOPY (EGD);  Surgeon: Laureen Galvan MD;  Location:  GI     ESOPHAGOSCOPY, GASTROSCOPY, DUODENOSCOPY (EGD), COMBINED N/A 7/21/2016    Procedure: COMBINED ESOPHAGOSCOPY, GASTROSCOPY, DUODENOSCOPY (EGD);  Surgeon: Laureen Galvan MD;  [facility]     [facility] OR CATH ABLATION NON-CARDIAC ENDOVASCULAR  1990,2002    SVT     INSERT PORT VASCULAR ACCESS N/A 4/3/2017    Procedure: INSERT PORT VASCULAR ACCESS;  Surgeon: Rajendra Jacques PA-C;  [facility]     LIGATE FISTULA ARTERIOVENOUS UPPER EXTREMITY Right 11/7/2017    Procedure: LIGATE FISTULA  "ARTERIOVENOUS UPPER EXTREMITY;  Revision of Right Arm Arteriovenous Fistula ;  Surgeon: Padmaja Eaton MD;  Location: [facility] OR     PICC INSERTION  14    PICC line placement 14; Removal 2014     THORACIC SURGERY  1980    Transposition great arteries, repaired at 8 months     THYROIDECTOMY Right 2017    Procedure: THYROIDECTOMY;  Bilateral Total Thyroidectomy ;  Surgeon: Beka Soto MD;  [facility]     TRANSPLANT KIDNEY RECIPIENT  DONOR  5/10/2016    Procedure: TRANSPLANT KIDNEY RECIPIENT  DONOR;  Surgeon: Ricky Deshpande MD;  [facility]     TRANSPLANT LIVER RECIPIENT  DONOR N/A 5/10/2016    Procedure: TRANSPLANT LIVER RECIPIENT  DONOR;  Surgeon: Ricky Deshpande MD;  Location[facility]   As above, including HPI    Family History   Problem Relation Age of Onset     Hypertension Brother      Hypertension Sister      Arthritis Father      Hypertension Father      Hypertension Mother      Hypertension Sister      Alcohol/Drug No family hx of      GASTROINTESTINAL DISEASE No family hx of      no fam hx of liver disease or liver cancer       Current Outpatient Prescriptions   Medication     apixaban ANTICOAGULANT (ELIQUIS) 5 MG tablet     sulfamethoxazole-trimethoprim (BACTRIM/SEPTRA) 400-80 MG per tablet     tacrolimus (GENERIC EQUIVALENT) 1 MG capsule     tacrolimus (GENERIC EQUIVALENT) 0.5 MG capsule     mycophenolate (GENERIC EQUIVALENT) 250 MG capsule     lisinopril (PRINIVIL/ZESTRIL) 5 MG tablet     amLODIPine (NORVASC) 10 MG tablet     levothyroxine (SYNTHROID/LEVOTHROID) 150 MCG tablet     magnesium oxide (MAG-OX) 400 (241.3 MG) MG tablet     predniSONE (DELTASONE) 5 MG tablet     No current facility-administered medications for this visit.        Allergies   Allergen Reactions     Hydromorphone Itching     ROS: 12 point ROS neg other than the symptoms noted above in the HPI.    /84  Pulse 64  Ht 5' 10\" (177.8 cm)  Wt 208 lb 9.6 oz (94.6 kg) "  SpO2 97%  BMI 29.93 kg/m2  General: appears comfortable, alert and articulate  Head: normocephalic, atraumatic  Eyes: anicteric sclera, EOMI  Neck: no adenopathy or masses, 2+ carotids without bruits  Orophyarynx: moist mucosa, no lesions, dentition intact  Heart: regular, normal S1, loud S2, no murmur, gallop, or rub, estimated JVP 5 cm  Lungs: clear, no rales or wheezing  Abdomen: soft, non-tender, bowel sounds present, no hepatomegaly  Extremities: no clubbing, cyanosis or edema  Neurological: normal speech and affect, no gross motor deficits    Orders Only on 03/20/2018   Component Date Value Ref Range Status     Sodium 03/20/2018 141  133 - 144 mmol/L Final     Potassium 03/20/2018 4.1  3.4 - 5.3 mmol/L Final     Chloride 03/20/2018 107  94 - 109 mmol/L Final     Carbon Dioxide 03/20/2018 25  20 - 32 mmol/L Final     Anion Gap 03/20/2018 9  3 - 14 mmol/L Final     Glucose 03/20/2018 96  70 - 99 mg/dL Final     Urea Nitrogen 03/20/2018 20  7 - 30 mg/dL Final     Creatinine 03/20/2018 1.28* 0.66 - 1.25 mg/dL Final     GFR Estimate 03/20/2018 63  >60 mL/min/1.7m2 Final    Non  GFR Calc     GFR Estimate If Black 03/20/2018 76  >60 mL/min/1.7m2 Final    African American GFR Calc     Calcium 03/20/2018 9.5  8.5 - 10.1 mg/dL Final     WBC 03/20/2018 2.7* 4.0 - 11.0 10e9/L Final     RBC Count 03/20/2018 5.14  4.4 - 5.9 10e12/L Final     Hemoglobin 03/20/2018 15.5  13.3 - 17.7 g/dL Final     Hematocrit 03/20/2018 45.6  40.0 - 53.0 % Final     MCV 03/20/2018 89  78 - 100 fl Final     MCH 03/20/2018 30.2  26.5 - 33.0 pg Final     MCHC 03/20/2018 34.0  31.5 - 36.5 g/dL Final     RDW 03/20/2018 13.7  10.0 - 15.0 % Final     Platelet Count 03/20/2018 113* 150 - 450 10e9/L Final     Tacrolimus Last Dose 03/20/2018 Not Provided   Final     Tacrolimus Level 03/20/2018 3.8* 5.0 - 15.0 ug/L Final    Comment: Tacrolimus Reference Range  Kidney Transplant  Pediatric                      ug/L    0-3 months post  transplant   10-12    3-6 months post transplant   8-10    6-12 months post transplant  6-8    >12 months post transplant   4-7  Adult    0-6 months post transplant   8-10    6-12 months post transplant  6-8    >12 months post transplant   4-6    >5 years post transplant     3-5  Heart Transplant  Pediatric    0-12 months post transplant  10-15    >12 months post transplant   5-10  Adult    0-3 months post transplant   10-15    3-6 months post transplant   8-12    6-12 months post transplant  6-12    >12 months post transplant   6-10  Lung Transplant    0-12 months post transplant  10-15    >12 months post transplant   8-12  Liver Transplant  Pediatric    0-3 months post transplant   10-15    3-6 months post transplant   8-10    >6 months post transplant    6-8  Adult    0-3 months post transplant   10-12    3-6 months post transplant   8-10    >6 months post transplant    6-8  Pancrea                           s Transplant    0-6 months post transplant   8-10    >6 months post transplant    5-8  This test was developed and its performance characteristics determined by the   [facility],  Special Chemistry Laboratory. It has   not been cleared or approved by the FDA. The laboratory is regulated under   CLIA as qualified to perform high-complexity testing. This test is used for   clinical purposes. It should not be regarded as investigational or for   research.       BK Virus Specimen 03/20/2018 Plasma   Final     BK Virus Result 03/20/2018 20013* BKNEG^BK Virus DNA Not Detected copies/mL Final     BK Virus Log 03/20/2018 4.3* <2.7 Log copies/mL Final    Comment: The Real-Time quantitative BK Virus assay was developed and its performance   characteristics determined by the Infect[facility] Diagnostic Laboratory at   the [facility] in [place], [place]. The   primers and probes for each analyte are Analyte Specific Reagents (ASRs)   manufactured by  [employer].  ASRs are used in many laboratory tests necessary for standard medical care and   generally do not require U.S. Food and Drug Administration approval. The FDA   has determined that such clearance or approval is not necessary.  This test is used for clinical purposes. It should not be regarded as   investigational or for research. This laboratory is certified under the   Clinical Laboratory Improvement Amendments of 1988 (CLIA-88) as qualified to   perform high complexity clinical laboratory testing.       Cardiac MRI 3/9/2017  1. Transposition of the great arteries with native atrioventricular   concordance and ventricular arterial discordance with the aorta anterior   to the pulmonary artery (D-transposition of the great arteries).  2. Status post interatrial baffle with the superior vena cava and inferior   vena cava baffled to the left (subpulmonic) ventricle without obstruction.    There is no evidence of a baffle leak.    3. Systemic right ventricle:  a. The right ventricle is hypertrophied.  b. Decreased systolic function with an ejection fraction of 33%.  c. Severe dilation of the right ventricle with an end-diastolic volume of   208 mL/m  (previously 188 mL/m ) and end-systolic volume of 140 mL/m    (previous 123 mL/m ).  4. The right ventricle connects with the anterior aorta.  The coronary   artery origins are usual for transposition.  Left-sided aortic arch with   measurements above.  5. The pulmonary venous baffle to the right ventricle is widely patent.  6. The subpulmonary left ventricle has normal systolic function with   decreased myocardial mass.  The left ventricle connects to the pulmonary   artery, which is posterior to the aorta.  7. No significant change from previous cardiac MRI of 9/4/2012 except an   increased size in the indexed right ventricular diastolic and systolic   Volumes    Echo (3/30/18):   Patient after atrial switch operation for complete transposition of the great  arteries. Technically difficult study due to poor acoustic windows. No baffle obstruction or leaks seen. There is moderate right ventricular enlargement. Single plane right ventricular EF 35-40 %. Normal left ventricular systolic function. No outflow obstruction. Mild tricuspid regurgitation.    EKG (3/30:18): Sinus rhythm, RAD, RBBB, prominent RV forces with repolarization abnormality.      Assessment and Plan:    Mr. 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    1.  TGA s/p atrial switch now with stably depressed systemic ventricular function:  Although ventricular function is depressed this has been stable for the past several years.  In regard to heart failure medical regimen, his systemic ventricle is an anatomic right ventricle and there is no proven therapies to improve morbidity or mortality in right ventricular failure and especially systemic right ventricular failure.  As he is currently not experiencing any symptoms and does not have any evidence of volume overload on today's exam, thus we will not change his medication regimen at this point in time.  We would like to see him back in 1 year with a repeat echocardiogram at that point in time unless he experiences new symptoms.  We would like for him to undergo a baseline cardiopulmonary exercise test prior to his congenital EP visit, which we will follow up.  Plan for an MRI in 2 years.      2.   H/o atrial flutter s/p ablation: Currently in sinus rhythm, given recent cardioversion will continue apixaban until follow up with congenital EP in 2 weeks.     Yeison Lawson,   Pediatric Cardiology Fellow      I have reviewed today's vital  signs, notes, medications, labs and imaging. I have also seen and examined the patient and agree with the findings and plan as outlined above.    Francesca Alba MD  Section Head - Advanced Heart Failure, Transplantation and Mechanical Circulatory Support  Co-Director - Adult Congenital and Cardiovascular Genetics Center  Associate Professor of Medicine, [facility]

## 2018-04-02 LAB — INTERPRETATION ECG - MUSE: NORMAL

## 2018-04-03 ENCOUNTER — MYC MEDICAL ADVICE (OUTPATIENT)
Dept: ENDOCRINOLOGY | Facility: CLINIC | Age: 38
End: 2018-04-03

## 2018-04-03 DIAGNOSIS — C73 PAPILLARY THYROID CARCINOMA (H): ICD-10-CM

## 2018-04-03 DIAGNOSIS — Z94.0 KIDNEY TRANSPLANTED: ICD-10-CM

## 2018-04-03 DIAGNOSIS — E89.0 POSTOPERATIVE HYPOTHYROIDISM: ICD-10-CM

## 2018-04-03 DIAGNOSIS — C73 PAPILLARY THYROID CARCINOMA (H): Primary | ICD-10-CM

## 2018-04-03 LAB
ANION GAP SERPL CALCULATED.3IONS-SCNC: 8 MMOL/L (ref 3–14)
BUN SERPL-MCNC: 17 MG/DL (ref 7–30)
CALCIUM SERPL-MCNC: 9.3 MG/DL (ref 8.5–10.1)
CHLORIDE SERPL-SCNC: 106 MMOL/L (ref 94–109)
CO2 SERPL-SCNC: 25 MMOL/L (ref 20–32)
CREAT SERPL-MCNC: 1.34 MG/DL (ref 0.66–1.25)
ERYTHROCYTE [DISTWIDTH] IN BLOOD BY AUTOMATED COUNT: 13.5 % (ref 10–15)
GFR SERPL CREATININE-BSD FRML MDRD: 60 ML/MIN/1.7M2
GLUCOSE SERPL-MCNC: 89 MG/DL (ref 70–99)
HCT VFR BLD AUTO: 45 % (ref 40–53)
HGB BLD-MCNC: 15.5 G/DL (ref 13.3–17.7)
MCH RBC QN AUTO: 30.3 PG (ref 26.5–33)
MCHC RBC AUTO-ENTMCNC: 34.4 G/DL (ref 31.5–36.5)
MCV RBC AUTO: 88 FL (ref 78–100)
PLATELET # BLD AUTO: 131 10E9/L (ref 150–450)
POTASSIUM SERPL-SCNC: 3.9 MMOL/L (ref 3.4–5.3)
RBC # BLD AUTO: 5.11 10E12/L (ref 4.4–5.9)
SODIUM SERPL-SCNC: 139 MMOL/L (ref 133–144)
T4 FREE SERPL-MCNC: 1.36 NG/DL (ref 0.76–1.46)
TSH SERPL DL<=0.005 MIU/L-ACNC: 0.39 MU/L (ref 0.4–4)
WBC # BLD AUTO: 5.7 10E9/L (ref 4–11)

## 2018-04-03 PROCEDURE — 84439 ASSAY OF FREE THYROXINE: CPT | Performed by: INTERNAL MEDICINE

## 2018-04-03 PROCEDURE — 36415 COLL VENOUS BLD VENIPUNCTURE: CPT | Performed by: INTERNAL MEDICINE

## 2018-04-03 PROCEDURE — 85027 COMPLETE CBC AUTOMATED: CPT | Performed by: INTERNAL MEDICINE

## 2018-04-03 PROCEDURE — 84443 ASSAY THYROID STIM HORMONE: CPT | Performed by: INTERNAL MEDICINE

## 2018-04-03 PROCEDURE — 80048 BASIC METABOLIC PNL TOTAL CA: CPT | Performed by: INTERNAL MEDICINE

## 2018-04-04 PROBLEM — E89.0 POSTOPERATIVE HYPOTHYROIDISM: Status: ACTIVE | Noted: 2018-04-04

## 2018-04-04 RX ORDER — LEVOTHYROXINE SODIUM 137 UG/1
137 TABLET ORAL DAILY
Qty: 90 TABLET | Refills: 3 | Status: SHIPPED | OUTPATIENT
Start: 2018-04-04 | End: 2018-08-13

## 2018-04-06 ASSESSMENT — ENCOUNTER SYMPTOMS
SKIN CHANGES: 0
POOR WOUND HEALING: 0
NAIL CHANGES: 0

## 2018-04-13 ENCOUNTER — OFFICE VISIT (OUTPATIENT)
Dept: CARDIOLOGY | Facility: CLINIC | Age: 38
End: 2018-04-13
Attending: INTERNAL MEDICINE
Payer: COMMERCIAL

## 2018-04-13 ENCOUNTER — HOSPITAL ENCOUNTER (OUTPATIENT)
Dept: CARDIOLOGY | Facility: CLINIC | Age: 38
Discharge: HOME | End: 2018-04-13
Attending: INTERNAL MEDICINE | Admitting: INTERNAL MEDICINE
Payer: COMMERCIAL

## 2018-04-13 VITALS
OXYGEN SATURATION: 99 % | DIASTOLIC BLOOD PRESSURE: 83 MMHG | BODY MASS INDEX: 28.97 KG/M2 | HEIGHT: 70 IN | WEIGHT: 202.38 LBS | HEART RATE: 74 BPM | SYSTOLIC BLOOD PRESSURE: 126 MMHG

## 2018-04-13 DIAGNOSIS — I48.92 ATRIAL FLUTTER, UNSPECIFIED TYPE (H): Primary | ICD-10-CM

## 2018-04-13 DIAGNOSIS — Q20.3 COMPLETE TRANSPOSITION OF GREAT VESSELS: ICD-10-CM

## 2018-04-13 DIAGNOSIS — Q20.3 TGA (TRANSPOSITION OF GREAT ARTERIES): ICD-10-CM

## 2018-04-13 PROCEDURE — G0463 HOSPITAL OUTPT CLINIC VISIT: HCPCS | Mod: 25,ZF

## 2018-04-13 PROCEDURE — 99214 OFFICE O/P EST MOD 30 MIN: CPT | Mod: ZP | Performed by: INTERNAL MEDICINE

## 2018-04-13 PROCEDURE — 93010 ELECTROCARDIOGRAM REPORT: CPT | Mod: ZP | Performed by: INTERNAL MEDICINE

## 2018-04-13 PROCEDURE — 94618 PULMONARY STRESS TESTING: CPT | Mod: 26 | Performed by: INTERNAL MEDICINE

## 2018-04-13 PROCEDURE — 93005 ELECTROCARDIOGRAM TRACING: CPT | Mod: ZF

## 2018-04-13 PROCEDURE — 94621 CARDIOPULM EXERCISE TESTING: CPT | Performed by: INTERNAL MEDICINE

## 2018-04-13 RX ORDER — METOPROLOL SUCCINATE 25 MG/1
25 TABLET, EXTENDED RELEASE ORAL DAILY
Qty: 90 TABLET | Refills: 3 | Status: SHIPPED | OUTPATIENT
Start: 2018-04-13 | End: 2019-04-02

## 2018-04-13 ASSESSMENT — PAIN SCALES - GENERAL: PAINLEVEL: NO PAIN (0)

## 2018-04-13 NOTE — PATIENT INSTRUCTIONS
"You were seen today in the Adult Congenital and Cardiovascular Genetics Clinic at the [facility].    Cardiology Providers you saw during your visit:  Dr Jaylen George    Diagnosis:  History of A fib/ Aflutter, TGA    Results:  I will call you with the results of your cardiopulmonary stress test.    Recommendations:    1.  Continue to eat a heart healthy, low salt diet.  2.  Continue to get 20-30 minutes of aerobic activity, 4-5 days per week.  Examples of aerobic activity include walking, running, swimming, cycling, etc.  3.  Continue to observe good oral hygiene, with regular dental visits.  4.  Continue to take your Eliquis  5.  Start taking Metoprolol XL 25 mg 1 tablet daily.       Vitals:    04/13/18 1227   BP: 126/83   BP Location: Left arm   Patient Position: Chair   Cuff Size: Adult Regular   Pulse: 74   SpO2: 99%   Weight: 91.8 kg (202 lb 6.1 oz)   Height: 1.778 m (5' 10\")       SBE prophylaxis:   Yes____  No__x__    Lifelong Bacterial Endocarditis Prophylaxis:  YES____  NO____    If YES is checked, follow the recommendations outlined below:   1. Take antibiotic(s) prior to recommended dental procedures and procedures on the respiratory tract or with infected skin, muscle or bones. SBE prophylaxis is not needed for routine GI and  procedures (ie. Colonoscopy or vaginal delivery)   2. Observe good oral hygiene daily, as advised by your dentist. Get regular professional dental care.   3. Keep cuts clean.   4. Infections should be treated promptly.   5. Symptoms of Infective Endocarditis could include: fever lasting more than 4-5 days or a recurrent fever that initially resolves but returns within 1-2 days)     Exercise restrictions:   Yes____  No__x__         If yes, list restrictions:  Must be allowed to rest if fatigued or SOB      Work restrictions:  Yes____  No__x__         If yes, list restrictions:    FASTING CHOLESTEROL was checked in the last 5 years YES_x__  NO___  2014  Continue to eat " a heart healthy, low salt diet.         ____ Fasting lipid panel order today         ____ No changes in medications          ____ I recommend the following changes in your cholesterol medications.:          ____ Please follow up for cholesterol screening at your primary care physician      Follow-up:  Follow up with Dr George in 3-4 months with an EKG    For after hours urgent needs, call [phone] and ask to speak to the Adult Congenital Physician on call.  Mention Job Code [id number].    For emergencies call 911.    For any scheduling needs, please call [facility] , at [phone]  Thank you for your visit today!  If you have questions or concerns about today's visit, please call me.    Gagandeep Corado RN, BSN  Cardiology Care Coordinator  [facility]  [phone]    [address] [place] [address]  Mail Code [id number]  [place] [address]

## 2018-04-13 NOTE — MR AVS SNAPSHOT
"              After Visit Summary   4/13/2018    Cricket Chen    MRN: [id number]           Patient Information     Date Of Birth          1980        Visit Information        Provider Department      4/13/2018 1:00 PM Jaylen George MD [facility]        Today's Diagnoses     Atrial flutter, unspecified type (H)    -  1      Care Instructions    You were seen today in the Adult Congenital and Cardiovascular Genetics Clinic at the [facility].    Cardiology Providers you saw during your visit:  Dr Jaylen George    Diagnosis:  History of A fib/ Aflutter, TGA    Results:  I will call you with the results of your cardiopulmonary stress test.    Recommendations:    1.  Continue to eat a heart healthy, low salt diet.  2.  Continue to get 20-30 minutes of aerobic activity, 4-5 days per week.  Examples of aerobic activity include walking, running, swimming, cycling, etc.  3.  Continue to observe good oral hygiene, with regular dental visits.  4.  Continue to take your Eliquis  5.  Start taking Metoprolol XL 25 mg 1 tablet daily.       Vitals:    04/13/18 1227   BP: 126/83   BP Location: Left arm   Patient Position: Chair   Cuff Size: Adult Regular   Pulse: 74   SpO2: 99%   Weight: 91.8 kg (202 lb 6.1 oz)   Height: 1.778 m (5' 10\")       SBE prophylaxis:   Yes____  No__x__    Lifelong Bacterial Endocarditis Prophylaxis:  YES____  NO____    If YES is checked, follow the recommendations outlined below:   1. Take antibiotic(s) prior to recommended dental procedures and procedures on the respiratory tract or with infected skin, muscle or bones. SBE prophylaxis is not needed for routine GI and  procedures (ie. Colonoscopy or vaginal delivery)   2. Observe good oral hygiene daily, as advised by your dentist. Get regular professional dental care.   3. Keep cuts clean.   4. Infections should be treated promptly.   5. Symptoms of Infective Endocarditis could include: fever lasting more than 4-5 days or a " recurrent fever that initially resolves but returns within 1-2 days)     Exercise restrictions:   Yes____  No__x__         If yes, list restrictions:  Must be allowed to rest if fatigued or SOB      Work restrictions:  Yes____  No__x__         If yes, list restrictions:    FASTING CHOLESTEROL was checked in the last 5 years YES_x__  NO___  2014  Continue to eat a heart healthy, low salt diet.         ____ Fasting lipid panel order today         ____ No changes in medications          ____ I recommend the following changes in your cholesterol medications.:          ____ Please follow up for cholesterol screening at your primary care physician      Follow-up:  Follow up with Dr George in 3-4 months with an EKG    For after hours urgent needs, call [phone] and ask to speak to the Adult Congenital Physician on call.  Mention Job Code 0401.    For emergencies call 911.    For any scheduling needs, please call Brandan Hall, Procedure , at [phone]  Thank you for your visit today!  If you have questions or concerns about today's visit, please call me.    Gagandeep Corado, RN, BSN  Cardiology Care Coordinator  [facility]  [phone]    [address] [place] [address]  Mail Code [id number]  [place] [address]            Follow-ups after your visit        Additional Services     Follow-Up with Cardiologist       With EKG                  Your next 10 appointments already scheduled     Apr 17, 2018 11:15 AM CDT   LAB with [facility]   [facility] ([facility])    [address] [address] [phone]           Please do not eat 10-12 hours before your appointment if you are coming in fasting for labs on lipids, cholesterol, or glucose (sugar). This does not apply to pregnant women. Water, hot tea and black coffee (with nothing added) are okay. Do not drink other fluids, diet soda or chew gum.            Jul 24, 2018 11:00 AM CDT   (Arrive by  10:45 AM)   RETURN CONGENITAL HEART with Jaylen George MD   [facility]    [address]  [address]  [facility] [address]   [phone]            Aug 06, 2018  8:30 AM CDT   [facility] Lab Draw with  [facility] DRAW   [facility] ([facility]    [address]  [address]  [facility] [address]   [phone]            Aug 06, 2018  9:00 AM CDT   (Arrive by 8:45 AM)   Return Visit with Madhav Moody MD   [facility] ([facility]    [address]  [address]  [place] [facility] [address]   [phone]            Aug 06, 2018  2:20 PM CDT   (Arrive by 1:50 PM)   Return Kidney Transplant with Jake Sidhu MD   [facility] ([facility]    [address]  [address]  [place] [facility] [address]   [phone]            Aug 10, 2018  1:50 PM CDT   (Arrive by 1:35 PM)   RETURN ENDOCRINE with Zuleyma Gray MD   [facility] ([facility]    [address]  3rd Floor  [place] [facility] [address]   [phone]            George 15, 2019 12:10 PM CST   (Arrive by 11:55 AM)   Return Liver Transplant with Laureen Galvan MD   [facility] ([facility]    [address]  [address]  [place] [facility] [address]   [phone]            George 15, 2019  1:05 PM CST   (Arrive by 12:35 PM)   Return Kidney Transplant with Jake Sidhu MD   [facility] ([facility]    [address]  [address]  [place] [facility] [address]   [phone]              Future tests that were ordered for you today     Open Future Orders        Priority Expected Expires Ordered    Follow-Up with Cardiologist Routine 7/13/2018 4/14/2019 4/13/2018            Who to contact     If you have questions or need follow up  "information about today's clinic visit or your schedule please contact [facility] directly at [phone].  Normal or non-critical lab and imaging results will be communicated to you by MyChart, letter or phone within 4 business days after the clinic has received the results. If you do not hear from us within 7 days, please contact the clinic through [employer] or phone. If you have a critical or abnormal lab result, we will notify you by phone as soon as possible.  Submit refill requests through [employer] or call your pharmacy and they will forward the refill request to us. Please allow 3 business days for your refill to be completed.          Additional Information About Your Visit        MyChart Information     [employer] gives you secure access to your electronic health record. If you see a primary care provider, you can also send messages to your care team and make appointments. If you have questions, please call your primary care clinic.  If you do not have a primary care provider, please call [phone] and they will assist you.        Care EveryWhere ID     This is your Care EveryWhere ID. This could be used by other organizations to access your [facility] medical records  [id number]        Your Vitals Were     Pulse Height Pulse Oximetry BMI (Body Mass Index)          74 1.778 m (5' 10\") 99% 29.04 kg/m2         Blood Pressure from Last 3 Encounters:   04/13/18 126/83   03/30/18 126/84   03/07/18 106/73    Weight from Last 3 Encounters:   04/13/18 91.8 kg (202 lb 6.1 oz)   03/30/18 94.6 kg (208 lb 9.6 oz)   03/07/18 93.1 kg (205 lb 4.8 oz)              We Performed the Following     EKG 12-lead, tracing only (Same Day)          Today's Medication Changes          These changes are accurate as of 4/13/18  1:20 PM.  If you have any questions, ask your nurse or doctor.               Start taking these medicines.        Dose/Directions    metoprolol succinate 25 MG 24 hr tablet   Commonly known as:  " TOPROL-XL   Used for:  Atrial flutter, unspecified type (H)   Started by:  Jaylen George MD        Dose:  25 mg   Take 1 tablet (25 mg) by mouth daily   Quantity:  90 tablet   Refills:  3            Where to get your medicines      These medications were sent to [facility] - [place], [place] - [address]  [address], [facility] [address]    Hours:  Mon-Fri 8:30am-5:00pm Toll Free [phone] Phone:  [phone]     metoprolol succinate 25 MG 24 hr tablet                Primary Care Provider    [facility]       No address on file        Equal Access to Services     CLAY VIDES : Ana M Barroso, waaxda daryl, qaybalec kaalmada ramy, robina sibley. So [facility] [phone].    ATENCIÓN: Si habla español, tiene a laura disposición servicios gratuitos de asistencia lingüística. Llame al [phone].    We comply with applicable federal civil rights laws and [place] laws. We do not discriminate on the basis of race, color, national origin, age, disability, sex, sexual orientation, or gender identity.            Thank you!     Thank you for choosing [facility]  for your care. Our goal is always to provide you with excellent care. Hearing back from our patients is one way we can continue to improve our services. Please take a few minutes to complete the written survey that you may receive in the mail after your visit with us. Thank you!             Your Updated Medication List - Protect others around you: Learn how to safely use, store and throw away your medicines at www.disposemymeds.org.          This list is accurate as of 4/13/18  1:20 PM.  Always use your most recent med list.                   Brand Name Dispense Instructions for use Diagnosis    amLODIPine 10 MG tablet    NORVASC    90 tablet    TAKE ONE TABLET BY MOUTH EVERY DAY    HTN (hypertension)       apixaban ANTICOAGULANT 5 MG  tablet    ELIQUIS    60 tablet    Take 1 tablet (5 mg) by mouth 2 times daily    Atrial flutter, unspecified type (H)       levothyroxine 137 MCG tablet    SYNTHROID/LEVOTHROID    90 tablet    Take 1 tablet (137 mcg) by mouth daily    Papillary thyroid carcinoma (H), Postoperative hypothyroidism       lisinopril 5 MG tablet    PRINIVIL/ZESTRIL    90 tablet    TAKE ONE TABLET BY MOUTH EVERY DAY    HTN (hypertension)       magnesium oxide 400 (241.3 Mg) MG tablet    MAG-OX    120 tablet    Take 1 tablet (400 mg) by mouth 2 times daily    Hypomagnesemia       metoprolol succinate 25 MG 24 hr tablet    TOPROL-XL    90 tablet    Take 1 tablet (25 mg) by mouth daily    Atrial flutter, unspecified type (H)       mycophenolate 250 MG capsule    GENERIC EQUIVALENT    60 capsule    Take 1 capsule (250 mg) by mouth 2 times daily    Kidney replaced by transplant       predniSONE 5 MG tablet    DELTASONE    90 tablet    Take 1 tablet (5 mg) by mouth daily    Liver replaced by transplant (H), S/P kidney transplant       sulfamethoxazole-trimethoprim 400-80 MG per tablet    BACTRIM/SEPTRA    30 tablet    Take 1 tablet by mouth daily    Liver replaced by transplant (H), S/P kidney transplant       * tacrolimus 1 MG capsule    GENERIC EQUIVALENT    60 capsule    Take 1 capsule (1 mg) by mouth 2 times daily 1.5 mg twice per day.    Liver replaced by transplant (H), S/P kidney transplant       * tacrolimus 0.5 MG capsule    GENERIC EQUIVALENT    60 capsule    Take 1 capsule (0.5 mg) by mouth 2 times daily 1.5 mg twice per day.    S/P kidney transplant       * Notice:  This list has 2 medication(s) that are the same as other medications prescribed for you. Read the directions carefully, and ask your doctor or other care provider to review them with you.

## 2018-04-13 NOTE — NURSING NOTE
Chief Complaint   Patient presents with     New Patient     37 year old male with history of D-TGA s/p atrial switch (modified Henrik 4/23/81) and a flutter s/p DCCV presenting for follow up ( needs EKG)     Vitals were taken and medications were reconciled. EKG was performed.    VISH Cardenas  12:28 PM

## 2018-04-13 NOTE — LETTER
4/13/2018      RE: Cricket Chen  [address]  [facility] [address]       Dear Colleague,    Thank you for the opportunity to participate in the care of your patient, Cricket Chen, at the [facility]. Please see a copy of my visit note below.    [facility] Electrophysiology Clinic Note  HPI:   Mr. Chen is a 37 year old male who has a past medical history significant for dTGA s/p modified Shoemaker operation and stitch closure of small VSD 1981, AFL s/p DCCV and s/p ablation 8/2000, liver and kidney transplant 2016, post transplant lymphoproliferative disorder, papillary thyroid cancer s/p thyroidectomy, prior ETOH abuse (sober since 2014), anxiety, and depression. He presents today to establish outpatient EP care.     He presented to [facility] on 3/6/18 with shortness of breath and chest flutterings that started the day prior. He recalled it felt similar to when he was in AFL in the past. In the [facility], he was found to be in AFL. He was subsequently admitted. He was started on Eliquis and arranged for NEELIMA/DCCV. He had successful DCCV 3/7/18 and he was discharged. He states that he had AFL in the past. He believes he had a DCCV around age 7. He had also been maintained on Sotalol. He also reports a previous ablation at an OSH in 8/2000 (records not available to us at this time). He did well without AFL issues until 3/2018 when he had recurrence. Most recent echo from 3/30/18 showed systemic EF 35-40%, mild TR, and no evidence of baffle leaks/obstructions. A CMRI from OSH from 3/2017 showed no baffle  Obstruction and systemic EF 33%.     He reports feeling well today. He has not noted any further arrhythmias since his DCCV. He recently saw Dr. Alba who will have him do a CPX. He denies any chest pain/pressures, dizziness, lightheadedness, worsening shortness of breath, leg/ankle swelling, PND, orthopnea, palpitations, or syncopal symptoms. Presenting 12  lead ECG shows NSR IRBBB Vent Rate 68 bpm,  ms,  ms, QTc 440 ms. Current cardiac medications include: Eliquis, Lisinopril, and Norvasc.     PAST MEDICAL HISTORY:  Past Medical History:   Diagnosis Date     Alcohol abuse     Last drink in Mid-April 2014     Anemia in ESRD (end-stage renal disease) (H)      Anxiety 2008     Atrial flutter (H)      Cirrhosis (H)     S/P liver transplant     Depression      History of blood transfusion      History of transposition of great vessels     atrial switch at age 8 months old     Hypertension      Liver transplant recipient (H)     2016     Papillary thyroid carcinoma (H)      Pneumonia 11-15-14     Renal transplant recipient     2016     Varices, esophageal (H)        CURRENT MEDICATIONS:  Current Outpatient Prescriptions   Medication Sig Dispense Refill     metoprolol succinate (TOPROL-XL) 25 MG 24 hr tablet Take 1 tablet (25 mg) by mouth daily 90 tablet 3     levothyroxine (SYNTHROID/LEVOTHROID) 137 MCG tablet Take 1 tablet (137 mcg) by mouth daily 90 tablet 3     apixaban ANTICOAGULANT (ELIQUIS) 5 MG tablet Take 1 tablet (5 mg) by mouth 2 times daily 60 tablet 3     sulfamethoxazole-trimethoprim (BACTRIM/SEPTRA) 400-80 MG per tablet Take 1 tablet by mouth daily 30 tablet 11     tacrolimus (GENERIC EQUIVALENT) 1 MG capsule Take 1 capsule (1 mg) by mouth 2 times daily 1.5 mg twice per day. 60 capsule 5     tacrolimus (GENERIC EQUIVALENT) 0.5 MG capsule Take 1 capsule (0.5 mg) by mouth 2 times daily 1.5 mg twice per day. 60 capsule 5     mycophenolate (GENERIC EQUIVALENT) 250 MG capsule Take 1 capsule (250 mg) by mouth 2 times daily 60 capsule 11     lisinopril (PRINIVIL/ZESTRIL) 5 MG tablet TAKE ONE TABLET BY MOUTH EVERY DAY 90 tablet 3     amLODIPine (NORVASC) 10 MG tablet TAKE ONE TABLET BY MOUTH EVERY DAY 90 tablet 3     magnesium oxide (MAG-OX) 400 (241.3 MG) MG tablet Take 1 tablet (400 mg) by mouth 2 times daily 120 tablet 11     predniSONE (DELTASONE) 5  MG tablet Take 1 tablet (5 mg) by mouth daily 90 tablet 3       PAST SURGICAL HISTORY:  Past Surgical History:   Procedure Laterality Date     ANESTHESIA CARDIOVERSION N/A 3/7/2018    Procedure: ANESTHESIA CARDIOVERSION;  Cardioversion;  Surgeon: GENERIC ANESTHESIA PROVIDER;  [facility]: [facility]     BENCH LIVER N/A 5/10/2016    Procedure: BENCH LIVER;  Surgeon: Ricky Deshpande MD;  [facility]     BIOPSY LYMPH NODE CERVICAL Right 1/26/2017    Procedure: BIOPSY LYMPH NODE CERVICAL;  Surgeon: Beka Soto MD;  [facility]     CARDIAC SURGERY  9-10-80     CREATE FISTULA ARTERIOVENOUS UPPER EXTREMITY Right 9/16/2014    Procedure: CREATE FISTULA ARTERIOVENOUS UPPER EXTREMITY;  Surgeon: Padmaja Eaton MD;  [facility]     CYSTOSCOPY, REMOVE STENT(S), COMBINED Right 6/22/2016    Procedure: COMBINED CYSTOSCOPY, REMOVE STENT(S);  Surgeon: Ricky Deshpande MD;  [facility]     ENT SURGERY       ESOPHAGOSCOPY, GASTROSCOPY, DUODENOSCOPY (EGD), COMBINED  5/30/2014    Procedure: COMBINED ESOPHAGOSCOPY, GASTROSCOPY, DUODENOSCOPY (EGD);  Surgeon: Guillaume Bautista MD;  Location:  GI     ESOPHAGOSCOPY, GASTROSCOPY, DUODENOSCOPY (EGD), COMBINED  9/30/14     ESOPHAGOSCOPY, GASTROSCOPY, DUODENOSCOPY (EGD), COMBINED Left 3/12/2015    Procedure: COMBINED ESOPHAGOSCOPY, GASTROSCOPY, DUODENOSCOPY (EGD);  Surgeon: Laureen Galvan MD;  Location:  GI     ESOPHAGOSCOPY, GASTROSCOPY, DUODENOSCOPY (EGD), COMBINED N/A 4/21/2016    Procedure: COMBINED ESOPHAGOSCOPY, GASTROSCOPY, DUODENOSCOPY (EGD);  Surgeon: Laureen Galvan MD;  Location:  GI     ESOPHAGOSCOPY, GASTROSCOPY, DUODENOSCOPY (EGD), COMBINED N/A 7/21/2016    Procedure: COMBINED ESOPHAGOSCOPY, GASTROSCOPY, DUODENOSCOPY (EGD);  Surgeon: Laureen Galvan MD;  Location:  GI     [facility] OR CATH ABLATION NON-CARDIAC ENDOVASCULAR  1990,2002    SVT     INSERT PORT VASCULAR ACCESS N/A 4/3/2017    Procedure: INSERT PORT VASCULAR  "ACCESS;  Surgeon: Rajendra Jacques PA-C;  [facility]     LIGATE FISTULA ARTERIOVENOUS UPPER EXTREMITY Right 2017    Procedure: LIGATE FISTULA ARTERIOVENOUS UPPER EXTREMITY;  Revision of Right Arm Arteriovenous Fistula ;  Surgeon: Padmaja Eaton MD;  [facility]     PICC INSERTION  14    PICC line placement 14; Removal 2014     THORACIC SURGERY  1980    Transposition great arteries, repaired at 8 months     THYROIDECTOMY Right 2017    Procedure: THYROIDECTOMY;  Bilateral Total Thyroidectomy ;  Surgeon: Beka Soto MD;  [facility]     TRANSPLANT KIDNEY RECIPIENT  DONOR  5/10/2016    Procedure: TRANSPLANT KIDNEY RECIPIENT  DONOR;  Surgeon: Ricky Deshpande MD;  [facility]     TRANSPLANT LIVER RECIPIENT  DONOR N/A 5/10/2016    Procedure: TRANSPLANT LIVER RECIPIENT  DONOR;  Surgeon: Ricky Deshpande MD;  [facility]       ALLERGIES:     Allergies   Allergen Reactions     Hydromorphone Itching       FAMILY HISTORY:  Family History   Problem Relation Age of Onset     Hypertension Brother      Hypertension Sister      Arthritis Father      Hypertension Father      Hypertension Mother      Hypertension Sister      Alcohol/Drug No family hx of      GASTROINTESTINAL DISEASE No family hx of      no fam hx of liver disease or liver cancer       SOCIAL HISTORY:  Social History   Substance Use Topics     Smoking status: Former Smoker     Packs/day: 0.33     Years: 3.00     Types: Cigarettes     Start date: 1997     Quit date: 2000     Smokeless tobacco: Former User     Alcohol use No      Comment: ~10 drinks per day for ten years, quit in 2014       ROS:   A comprehensive 10 point review of systems negative other than as mentioned in HPI.  Exam:  /83 (BP Location: Left arm, Patient Position: Chair, Cuff Size: Adult Regular)  Pulse 74  Ht 1.778 m (5' 10\")  Wt 91.8 kg (202 lb 6.1 oz)  SpO2 99%  BMI 29.04 " kg/m2  GENERAL APPEARANCE: alert and no distress  HEENT: no icterus, no xanthelasmas, normal pupil size and reaction, normal palate, mucosa moist, no central cyanosis  NECK: no adenopathy, no asymmetry, masses, or scars, thyroid normal to palpation and no bruits, JVP not elevated  RESPIRATORY: lungs clear to auscultation - no rales, rhonchi or wheezes, no use of accessory muscles, no retractions, respirations are unlabored, normal respiratory rate  CARDIOVASCULAR: regular rhythm, normal S1 with physiologic split S2, no S3 or S4 and no murmur, click or rub, precordium quiet with normal PMI.  ABDOMEN: soft, non tender, bowel sounds normal, non-distended  EXTREMITIES: peripheral pulses normal, no edema  NEURO: alert and oriented to person/place/time, normal speech, gait and affect  SKIN: no ecchymoses, no rashes  PSYCH: normal affect, cooperative    Labs:  Reviewed.     Testing/Procedures:  PULMONARY FUNCTION TESTS:   PFT Latest Ref Rng & Units 10/14/2014   FVC L 4.95   FEV1 L 4.38   FVC% % 92   FEV1% % 100         3/30/18 ECHOCARDIOGRAM:   Patient after atrial switch operation for complete transposition of the great  arteries. Technically difficult study due to poor acoustic windows. No baffle  obstruction or leaks seen. There is moderate right ventricular enlargement.  Single plane right ventricular EF 35-40 %. Normal left ventricular systolic  function. No outflow obstruction. Mild tricuspid regurgitation.    3/2017 CMRI (OSH REPORT):  1. Transposition of the great arteries with native atrioventricular   concordance and ventricular arterial discordance with the aorta anterior   to the pulmonary artery (D-transposition of the great arteries).  2. Status post interatrial baffle with the superior vena cava and inferior   vena cava baffled to the left (subpulmonic) ventricle without obstruction.    There is no evidence of a baffle leak.    3. Systemic right ventricle:  a. The right ventricle is hypertrophied.  b.  Decreased systolic function with an ejection fraction of 33%.  c. Severe dilation of the right ventricle with an end-diastolic volume of   208 mL/m  (previously 188 mL/m ) and end-systolic volume of 140 mL/m    (previous 123 mL/m ).  4. The right ventricle connects with the anterior aorta.  The coronary   artery origins are usual for transposition.  Left-sided aortic arch with   measurements above.  5. The pulmonary venous baffle to the right ventricle is widely patent.  6. The subpulmonary left ventricle has normal systolic function with   decreased myocardial mass.  The left ventricle connects to the pulmonary   artery, which is posterior to the aorta.  7. No significant change from previous cardiac MRI of 2012 except an   increased size in the indexed right ventricular diastolic and systolic   volumes.    Assessment and Plan:   Mr. Chen is a 37 year old male who has a past medical history significant for dTGA s/p modified Shoemaker operation and stitch closure of small VSD , AFL s/p DCCV and s/p ablation 2000, liver and kidney transplant , post transplant lymphoproliferative disorder, papillary thyroid cancer s/p thyroidectomy, prior ETOH abuse (sober since ), anxiety, and depression. He presents today to establish outpatient EP care. He presented to ER on 3/6/18 with a 1 day history of SOB and chest fluttering found to be in AFL. He was started on Eilquis and underwent NEELIMA/DCCV on 3/7/18.  Most recent echo from 3/30/18 showed systemic EF 35-40%, mild TR, and no evidence of baffle leaks/obstructions. A CMRI from OSH from 3/2017 showed no baffle  Obstruction and systemic EF 33%.     We discussed in detail with the patient management/treatment options for AFL includin. Stroke Prophylaxis:  CHADSVASC=+HF, +HTN  2, corresponding to a 2.2% annual stroke / systemic emolism event rate. indicating need for long term oral anticoagulation. He also has congenital anatomy which we would recommend  anticoagulation. He is appropriately on Eliquis. No bleeding isues.    2. Rate Control: Start Metoprolol XL 25 mg daily.   3. Rhythm Control: Cardioversion, Antiarrhythmics and/or ablation are options for rhythm control. He has previously been on Sotalol. Not currently on AATs. S/p recent DCCV with history of Ablation at OSH in 2000. Organized A.flutter is highly amenable to ablation procedure. An ablation can be considered for any recurrence.   4. Risk Factor Management: maintain tight BP control, and JUAN evaluation.       dTGA systemic EF 35-40%, NYHA I-II:  1. ACEi/ARB: Continue Lisiopril.  2. BB: Start Metoprolol XL 25 mg daily.   3. Aldosterone antagonist: not required.  4. SCD prophylaxis: not currently indicated.   5. Fluid status: euvolemic on exam    Follow up in 4 months.     The patient states understanding and is agreeable with plan.   This patient was seen and evaluated with Dr. George. The above note reflects our joint assessment and plan.   DARYN Rios CNP  Pager: [phone]    EP STAFF NOTE  I have seen and examined the patient as part of a shared visit with MADELYN Rios NP.  I agree with the note above. I reviewed today's vital signs and medications. I have reviewed and discussed with the advanced practice provider their physical examination, assessment, and plan   Briefly, patient with Mustard, AFL with no recurrences, on AC  My key history/exam findings are: RRR.   The key management decisions made by me: continue AC, ablation for any recurrences.    Jaylen George MD [facility]  Cardiology - Electrophysiology

## 2018-04-13 NOTE — NURSING NOTE
Med Reconcile: Reviewed and verified all current medications with the patient. The updated medication list was printed and given to the patient.    New Medication: Metoprolol XL 25 mg daily.  Patient was educated regarding newly prescribed medication, including discussion of  the indication, administration, side effects, and when to report to MD or RN. Patient demonstrated understanding of this information and agreed to call with further questions or concerns.    Return Appointment: Follow up with Dr George in 3-4 mo with an EKG. Patient given instructions regarding scheduling next clinic visit. Patient demonstrated understanding of this information and agreed to call with further questions or concerns.    Patient demonstrated understanding of this information and agreed to call with further questions or concerns.    Gagandeep Corado, RN  RN Care Coordinator  [facility]  [phone]

## 2018-04-15 NOTE — PROGRESS NOTES
[facility] Electrophysiology Clinic Note  HPI:   Mr. Chen is a 37 year old male who has a past medical history significant for dTGA s/p modified Shoemaker operation and stitch closure of small VSD 1981, AFL s/p DCCV and s/p ablation 8/2000, liver and kidney transplant 2016, post transplant lymphoproliferative disorder, papillary thyroid cancer s/p thyroidectomy, prior ETOH abuse (sober since 2014), anxiety, and depression. He presents today to establish outpatient EP care.     He presented to [facility] on 3/6/18 with shortness of breath and chest flutterings that started the day prior. He recalled it felt similar to when he was in AFL in the past. In the [facility], he was found to be in AFL. He was subsequently admitted. He was started on Eliquis and arranged for NEELIMA/DCCV. He had successful DCCV 3/7/18 and he was discharged. He states that he had AFL in the past. He believes he had a DCCV around age 7. He had also been maintained on Sotalol. He also reports a previous ablation at an OSH in 8/2000 (records not available to us at this time). He did well without AFL issues until 3/2018 when he had recurrence. Most recent echo from 3/30/18 showed systemic EF 35-40%, mild TR, and no evidence of baffle leaks/obstructions. A CMRI from OSH from 3/2017 showed no baffle  Obstruction and systemic EF 33%.     He reports feeling well today. He has not noted any further arrhythmias since his DCCV. He recently saw Dr. Alba who will have him do a CPX. He denies any chest pain/pressures, dizziness, lightheadedness, worsening shortness of breath, leg/ankle swelling, PND, orthopnea, palpitations, or syncopal symptoms. Presenting 12 lead ECG shows NSR IRBBB Vent Rate 68 bpm,  ms,  ms, QTc 440 ms. Current cardiac medications include: Eliquis, Lisinopril, and Norvasc.     PAST MEDICAL HISTORY:  Past Medical History:   Diagnosis Date     Alcohol abuse     Last drink in Mid-April 2014     Anemia in ESRD (end-stage renal disease) (H)       Anxiety 2008     Atrial flutter (H)      Cirrhosis (H)     S/P liver transplant     Depression      History of blood transfusion      History of transposition of great vessels     atrial switch at age 8 months old     Hypertension      Liver transplant recipient (H)     2016     Papillary thyroid carcinoma (H)      Pneumonia 11-15-14     Renal transplant recipient     2016     Varices, esophageal (H)        CURRENT MEDICATIONS:  Current Outpatient Prescriptions   Medication Sig Dispense Refill     metoprolol succinate (TOPROL-XL) 25 MG 24 hr tablet Take 1 tablet (25 mg) by mouth daily 90 tablet 3     levothyroxine (SYNTHROID/LEVOTHROID) 137 MCG tablet Take 1 tablet (137 mcg) by mouth daily 90 tablet 3     apixaban ANTICOAGULANT (ELIQUIS) 5 MG tablet Take 1 tablet (5 mg) by mouth 2 times daily 60 tablet 3     sulfamethoxazole-trimethoprim (BACTRIM/SEPTRA) 400-80 MG per tablet Take 1 tablet by mouth daily 30 tablet 11     tacrolimus (GENERIC EQUIVALENT) 1 MG capsule Take 1 capsule (1 mg) by mouth 2 times daily 1.5 mg twice per day. 60 capsule 5     tacrolimus (GENERIC EQUIVALENT) 0.5 MG capsule Take 1 capsule (0.5 mg) by mouth 2 times daily 1.5 mg twice per day. 60 capsule 5     mycophenolate (GENERIC EQUIVALENT) 250 MG capsule Take 1 capsule (250 mg) by mouth 2 times daily 60 capsule 11     lisinopril (PRINIVIL/ZESTRIL) 5 MG tablet TAKE ONE TABLET BY MOUTH EVERY DAY 90 tablet 3     amLODIPine (NORVASC) 10 MG tablet TAKE ONE TABLET BY MOUTH EVERY DAY 90 tablet 3     magnesium oxide (MAG-OX) 400 (241.3 MG) MG tablet Take 1 tablet (400 mg) by mouth 2 times daily 120 tablet 11     predniSONE (DELTASONE) 5 MG tablet Take 1 tablet (5 mg) by mouth daily 90 tablet 3       PAST SURGICAL HISTORY:  Past Surgical History:   Procedure Laterality Date     ANESTHESIA CARDIOVERSION N/A 3/7/2018    Procedure: ANESTHESIA CARDIOVERSION;  Cardioversion;  Surgeon: GENERIC ANESTHESIA PROVIDER;  Location:  OR     [facility] N/A  5/10/2016    Procedure: BENCH LIVER;  Surgeon: Ricky Deshpande MD;  Location: [facility] OR     BIOPSY LYMPH NODE CERVICAL Right 1/26/2017    Procedure: BIOPSY LYMPH NODE CERVICAL;  Surgeon: Beka Soto MD;  Location: [facility] OR     CARDIAC SURGERY  9-10-80     CREATE FISTULA ARTERIOVENOUS UPPER EXTREMITY Right 9/16/2014    Procedure: CREATE FISTULA ARTERIOVENOUS UPPER EXTREMITY;  Surgeon: Padmaja Eaton MD;  Location: [facility] OR     CYSTOSCOPY, REMOVE STENT(S), COMBINED Right 6/22/2016    Procedure: COMBINED CYSTOSCOPY, REMOVE STENT(S);  Surgeon: Ricky Deshpande MD;  Location: [facility] OR     ENT SURGERY       ESOPHAGOSCOPY, GASTROSCOPY, DUODENOSCOPY (EGD), COMBINED  5/30/2014    Procedure: COMBINED ESOPHAGOSCOPY, GASTROSCOPY, DUODENOSCOPY (EGD);  Surgeon: Guillaume Bautista MD;  Location: [facility] GI     ESOPHAGOSCOPY, GASTROSCOPY, DUODENOSCOPY (EGD), COMBINED  9/30/14     ESOPHAGOSCOPY, GASTROSCOPY, DUODENOSCOPY (EGD), COMBINED Left 3/12/2015    Procedure: COMBINED ESOPHAGOSCOPY, GASTROSCOPY, DUODENOSCOPY (EGD);  Surgeon: Laureen Galvan MD;  Location: [facility] GI     ESOPHAGOSCOPY, GASTROSCOPY, DUODENOSCOPY (EGD), COMBINED N/A 4/21/2016    Procedure: COMBINED ESOPHAGOSCOPY, GASTROSCOPY, DUODENOSCOPY (EGD);  Surgeon: Laureen Galvan MD;  Location: [facility]     ESOPHAGOSCOPY, GASTROSCOPY, DUODENOSCOPY (EGD), COMBINED N/A 7/21/2016    Procedure: COMBINED ESOPHAGOSCOPY, GASTROSCOPY, DUODENOSCOPY (EGD);  Surgeon: Laureen Galvan MD;  Location: [facility] GI     [facility] OR CATH ABLATION NON-CARDIAC ENDOVASCULAR  1990,2002    SVT     INSERT PORT VASCULAR ACCESS N/A 4/3/2017    Procedure: INSERT PORT VASCULAR ACCESS;  Surgeon: Rajendra Jacques PA-C;  Location: [facility] OR     LIGATE FISTULA ARTERIOVENOUS UPPER EXTREMITY Right 11/7/2017    Procedure: LIGATE FISTULA ARTERIOVENOUS UPPER EXTREMITY;  Revision of Right Arm Arteriovenous Fistula ;  Surgeon: Padmaja Eaton MD;  Location: [facility] OR     PICC INSERTION  5/30/14    PICC  "line placement 14; Removal 2014     THORACIC SURGERY  1980    Transposition great arteries, repaired at 8 months     THYROIDECTOMY Right 2017    Procedure: THYROIDECTOMY;  Bilateral Total Thyroidectomy ;  Surgeon: Beka Soto MD;  Location: [facility] OR     TRANSPLANT KIDNEY RECIPIENT  DONOR  5/10/2016    Procedure: TRANSPLANT KIDNEY RECIPIENT  DONOR;  Surgeon: Ricky Deshpande MD;  Location: [facility] OR     TRANSPLANT LIVER RECIPIENT  DONOR N/A 5/10/2016    Procedure: TRANSPLANT LIVER RECIPIENT  DONOR;  Surgeon: Ricky Deshpande MD;  Location: [facility] OR       ALLERGIES:     Allergies   Allergen Reactions     Hydromorphone Itching       FAMILY HISTORY:  Family History   Problem Relation Age of Onset     Hypertension Brother      Hypertension Sister      Arthritis Father      Hypertension Father      Hypertension Mother      Hypertension Sister      Alcohol/Drug No family hx of      GASTROINTESTINAL DISEASE No family hx of      no fam hx of liver disease or liver cancer       SOCIAL HISTORY:  Social History   Substance Use Topics     Smoking status: Former Smoker     Packs/day: 0.33     Years: 3.00     Types: Cigarettes     Start date: 1997     Quit date: 2000     Smokeless tobacco: Former User     Alcohol use No      Comment: ~10 drinks per day for ten years, quit in 2014       ROS:   A comprehensive 10 point review of systems negative other than as mentioned in HPI.  Exam:  /83 (BP Location: Left arm, Patient Position: Chair, Cuff Size: Adult Regular)  Pulse 74  Ht 1.778 m (5' 10\")  Wt 91.8 kg (202 lb 6.1 oz)  SpO2 99%  BMI 29.04 kg/m2  GENERAL APPEARANCE: alert and no distress  HEENT: no icterus, no xanthelasmas, normal pupil size and reaction, normal palate, mucosa moist, no central cyanosis  NECK: no adenopathy, no asymmetry, masses, or scars, thyroid normal to palpation and no bruits, JVP not elevated  RESPIRATORY: lungs clear to auscultation " - no rales, rhonchi or wheezes, no use of accessory muscles, no retractions, respirations are unlabored, normal respiratory rate  CARDIOVASCULAR: regular rhythm, normal S1 with physiologic split S2, no S3 or S4 and no murmur, click or rub, precordium quiet with normal PMI.  ABDOMEN: soft, non tender, bowel sounds normal, non-distended  EXTREMITIES: peripheral pulses normal, no edema  NEURO: alert and oriented to person/place/time, normal speech, gait and affect  SKIN: no ecchymoses, no rashes  PSYCH: normal affect, cooperative    Labs:  Reviewed.     Testing/Procedures:  PULMONARY FUNCTION TESTS:   PFT Latest Ref Rng & Units 10/14/2014   FVC L 4.95   FEV1 L 4.38   FVC% % 92   FEV1% % 100         3/30/18 ECHOCARDIOGRAM:   Patient after atrial switch operation for complete transposition of the great  arteries. Technically difficult study due to poor acoustic windows. No baffle  obstruction or leaks seen. There is moderate right ventricular enlargement.  Single plane right ventricular EF 35-40 %. Normal left ventricular systolic  function. No outflow obstruction. Mild tricuspid regurgitation.    3/2017 CMRI (OSH REPORT):  1. Transposition of the great arteries with native atrioventricular   concordance and ventricular arterial discordance with the aorta anterior   to the pulmonary artery (D-transposition of the great arteries).  2. Status post interatrial baffle with the superior vena cava and inferior   vena cava baffled to the left (subpulmonic) ventricle without obstruction.    There is no evidence of a baffle leak.    3. Systemic right ventricle:  a. The right ventricle is hypertrophied.  b. Decreased systolic function with an ejection fraction of 33%.  c. Severe dilation of the right ventricle with an end-diastolic volume of   208 mL/m  (previously 188 mL/m ) and end-systolic volume of 140 mL/m    (previous 123 mL/m ).  4. The right ventricle connects with the anterior aorta.  The coronary   artery origins are  usual for transposition.  Left-sided aortic arch with   measurements above.  5. The pulmonary venous baffle to the right ventricle is widely patent.  6. The subpulmonary left ventricle has normal systolic function with   decreased myocardial mass.  The left ventricle connects to the pulmonary   artery, which is posterior to the aorta.  7. No significant change from previous cardiac MRI of 2012 except an   increased size in the indexed right ventricular diastolic and systolic   volumes.    Assessment and Plan:   Mr. Chen is a 37 year old male who has a past medical history significant for dTGA s/p modified Shoemaker operation and stitch closure of small VSD , AFL s/p DCCV and s/p ablation 2000, liver and kidney transplant , post transplant lymphoproliferative disorder, papillary thyroid cancer s/p thyroidectomy, prior ETOH abuse (sober since ), anxiety, and depression. He presents today to establish outpatient EP care. He presented to [facility] on 3/6/18 with a 1 day history of SOB and chest fluttering found to be in AFL. He was started on Eilquis and underwent NEELIMA/DCCV on 3/7/18.  Most recent echo from 3/30/18 showed systemic EF 35-40%, mild TR, and no evidence of baffle leaks/obstructions. A CMRI from OSH from 3/2017 showed no baffle  Obstruction and systemic EF 33%.     We discussed in detail with the patient management/treatment options for AFL includin. Stroke Prophylaxis:  CHADSVASC=+HF, +HTN  2, corresponding to a 2.2% annual stroke / systemic emolism event rate. indicating need for long term oral anticoagulation. He also has congenital anatomy which we would recommend anticoagulation. He is appropriately on Eliquis. No bleeding isues.    2. Rate Control: Start Metoprolol XL 25 mg daily.   3. Rhythm Control: Cardioversion, Antiarrhythmics and/or ablation are options for rhythm control. He has previously been on Sotalol. Not currently on AATs. S/p recent DCCV with history of Ablation at OSH  in 2000. Organized A.flutter is highly amenable to ablation procedure. An ablation can be considered for any recurrence.   4. Risk Factor Management: maintain tight BP control, and JUAN evaluation.       dTGA systemic EF 35-40%, NYHA I-II:  1. ACEi/ARB: Continue Lisiopril.  2. BB: Start Metoprolol XL 25 mg daily.   3. Aldosterone antagonist: not required.  4. SCD prophylaxis: not currently indicated.   5. Fluid status: euvolemic on exam    Follow up in 4 months.     The patient states understanding and is agreeable with plan.   This patient was seen and evaluated with Dr. Sebastian. The above note reflects our joint assessment and plan.   DARYN Rios CNP  Pager: [phone]    EP STAFF NOTE  I have seen and examined the patient as part of a shared visit with MADELYN Rios NP.  I agree with the note above. I reviewed today's vital signs and medications. I have reviewed and discussed with the advanced practice provider their physical examination, assessment, and plan   Briefly, patient with Mustard, AFL with no recurrences, on AC  My key history/exam findings are: RRR.   The key management decisions made by me: continue AC, ablation for any recurrences.    Colton Sebastian MD [facility]  Cardiology - Electrophysiology    CC  COLTON SEBASTIAN

## 2018-04-16 LAB — INTERPRETATION ECG - MUSE: NORMAL

## 2018-04-17 DIAGNOSIS — Z94.0 S/P KIDNEY TRANSPLANT: ICD-10-CM

## 2018-04-17 DIAGNOSIS — Z94.4 LIVER REPLACED BY TRANSPLANT (H): ICD-10-CM

## 2018-04-17 DIAGNOSIS — D47.Z1 PTLD (POST-TRANSPLANT LYMPHOPROLIFERATIVE DISORDER) (H): ICD-10-CM

## 2018-04-17 DIAGNOSIS — Z94.0 KIDNEY TRANSPLANTED: ICD-10-CM

## 2018-04-17 LAB
ALBUMIN SERPL-MCNC: 4.2 G/DL (ref 3.4–5)
ALP SERPL-CCNC: 67 U/L (ref 40–150)
ALT SERPL W P-5'-P-CCNC: 20 U/L (ref 0–70)
ANION GAP SERPL CALCULATED.3IONS-SCNC: 8 MMOL/L (ref 3–14)
AST SERPL W P-5'-P-CCNC: 18 U/L (ref 0–45)
BASOPHILS # BLD AUTO: 0 10E9/L (ref 0–0.2)
BASOPHILS NFR BLD AUTO: 0.2 %
BILIRUB DIRECT SERPL-MCNC: 0.1 MG/DL (ref 0–0.2)
BILIRUB SERPL-MCNC: 0.7 MG/DL (ref 0.2–1.3)
BUN SERPL-MCNC: 19 MG/DL (ref 7–30)
CALCIUM SERPL-MCNC: 9.1 MG/DL (ref 8.5–10.1)
CHLORIDE SERPL-SCNC: 106 MMOL/L (ref 94–109)
CO2 SERPL-SCNC: 26 MMOL/L (ref 20–32)
CREAT SERPL-MCNC: 1.31 MG/DL (ref 0.66–1.25)
DIFFERENTIAL METHOD BLD: ABNORMAL
EOSINOPHIL # BLD AUTO: 0.1 10E9/L (ref 0–0.7)
EOSINOPHIL NFR BLD AUTO: 1.2 %
ERYTHROCYTE [DISTWIDTH] IN BLOOD BY AUTOMATED COUNT: 13.7 % (ref 10–15)
GFR SERPL CREATININE-BSD FRML MDRD: 61 ML/MIN/1.7M2
GLUCOSE SERPL-MCNC: 83 MG/DL (ref 70–99)
HCT VFR BLD AUTO: 43.5 % (ref 40–53)
HGB BLD-MCNC: 14.9 G/DL (ref 13.3–17.7)
LDH SERPL L TO P-CCNC: 151 U/L (ref 85–227)
LYMPHOCYTES # BLD AUTO: 0.5 10E9/L (ref 0.8–5.3)
LYMPHOCYTES NFR BLD AUTO: 11.9 %
MAGNESIUM SERPL-MCNC: 2.1 MG/DL (ref 1.6–2.3)
MCH RBC QN AUTO: 30.2 PG (ref 26.5–33)
MCHC RBC AUTO-ENTMCNC: 34.3 G/DL (ref 31.5–36.5)
MCV RBC AUTO: 88 FL (ref 78–100)
MONOCYTES # BLD AUTO: 0.3 10E9/L (ref 0–1.3)
MONOCYTES NFR BLD AUTO: 8.4 %
NEUTROPHILS # BLD AUTO: 3.2 10E9/L (ref 1.6–8.3)
NEUTROPHILS NFR BLD AUTO: 78.3 %
PLATELET # BLD AUTO: 130 10E9/L (ref 150–450)
POTASSIUM SERPL-SCNC: 4.1 MMOL/L (ref 3.4–5.3)
PROT SERPL-MCNC: 7.2 G/DL (ref 6.8–8.8)
RBC # BLD AUTO: 4.94 10E12/L (ref 4.4–5.9)
SODIUM SERPL-SCNC: 140 MMOL/L (ref 133–144)
WBC # BLD AUTO: 4 10E9/L (ref 4–11)

## 2018-04-17 PROCEDURE — 80053 COMPREHEN METABOLIC PANEL: CPT | Performed by: INTERNAL MEDICINE

## 2018-04-17 PROCEDURE — 83735 ASSAY OF MAGNESIUM: CPT | Performed by: INTERNAL MEDICINE

## 2018-04-17 PROCEDURE — 82248 BILIRUBIN DIRECT: CPT | Performed by: INTERNAL MEDICINE

## 2018-04-17 PROCEDURE — 87799 DETECT AGENT NOS DNA QUANT: CPT | Performed by: INTERNAL MEDICINE

## 2018-04-17 PROCEDURE — 80197 ASSAY OF TACROLIMUS: CPT | Performed by: INTERNAL MEDICINE

## 2018-04-17 PROCEDURE — 36415 COLL VENOUS BLD VENIPUNCTURE: CPT | Performed by: INTERNAL MEDICINE

## 2018-04-17 PROCEDURE — 85025 COMPLETE CBC W/AUTO DIFF WBC: CPT | Performed by: INTERNAL MEDICINE

## 2018-04-17 PROCEDURE — 83615 LACTATE (LD) (LDH) ENZYME: CPT | Performed by: INTERNAL MEDICINE

## 2018-04-18 LAB
BKV DNA # SPEC NAA+PROBE: ABNORMAL COPIES/ML
BKV DNA SPEC NAA+PROBE-LOG#: 4.1 LOG COPIES/ML
SPECIMEN SOURCE: ABNORMAL
TACROLIMUS BLD-MCNC: 3.9 UG/L (ref 5–15)
TME LAST DOSE: ABNORMAL H

## 2018-05-01 DIAGNOSIS — Z94.0 KIDNEY TRANSPLANTED: ICD-10-CM

## 2018-05-01 LAB
ANION GAP SERPL CALCULATED.3IONS-SCNC: 10 MMOL/L (ref 3–14)
BUN SERPL-MCNC: 17 MG/DL (ref 7–30)
CALCIUM SERPL-MCNC: 9.6 MG/DL (ref 8.5–10.1)
CHLORIDE SERPL-SCNC: 108 MMOL/L (ref 94–109)
CO2 SERPL-SCNC: 24 MMOL/L (ref 20–32)
CREAT SERPL-MCNC: 1.48 MG/DL (ref 0.66–1.25)
ERYTHROCYTE [DISTWIDTH] IN BLOOD BY AUTOMATED COUNT: 13.8 % (ref 10–15)
GFR SERPL CREATININE-BSD FRML MDRD: 53 ML/MIN/1.7M2
GLUCOSE SERPL-MCNC: 86 MG/DL (ref 70–99)
HCT VFR BLD AUTO: 46 % (ref 40–53)
HGB BLD-MCNC: 15.6 G/DL (ref 13.3–17.7)
MCH RBC QN AUTO: 30.2 PG (ref 26.5–33)
MCHC RBC AUTO-ENTMCNC: 33.9 G/DL (ref 31.5–36.5)
MCV RBC AUTO: 89 FL (ref 78–100)
PLATELET # BLD AUTO: 132 10E9/L (ref 150–450)
POTASSIUM SERPL-SCNC: 4.1 MMOL/L (ref 3.4–5.3)
RBC # BLD AUTO: 5.16 10E12/L (ref 4.4–5.9)
SODIUM SERPL-SCNC: 142 MMOL/L (ref 133–144)
TACROLIMUS BLD-MCNC: 4.5 UG/L (ref 5–15)
TME LAST DOSE: ABNORMAL H
WBC # BLD AUTO: 5.5 10E9/L (ref 4–11)

## 2018-05-01 PROCEDURE — 85027 COMPLETE CBC AUTOMATED: CPT | Performed by: INTERNAL MEDICINE

## 2018-05-01 PROCEDURE — 80048 BASIC METABOLIC PNL TOTAL CA: CPT | Performed by: INTERNAL MEDICINE

## 2018-05-01 PROCEDURE — 36415 COLL VENOUS BLD VENIPUNCTURE: CPT | Performed by: INTERNAL MEDICINE

## 2018-05-01 PROCEDURE — 80197 ASSAY OF TACROLIMUS: CPT | Performed by: INTERNAL MEDICINE

## 2018-05-02 ENCOUNTER — TELEPHONE (OUTPATIENT)
Dept: TRANSPLANT | Facility: CLINIC | Age: 38
End: 2018-05-02

## 2018-05-02 DIAGNOSIS — Z94.0 KIDNEY REPLACED BY TRANSPLANT: Primary | ICD-10-CM

## 2018-05-02 NOTE — TELEPHONE ENCOUNTER
Creatinine 1.48, baseline 1-1.2  Please call pt to see how he is feeling. Any illness, medication changes, is he hydrating well? Encourage adequate hydration and repeat BMP next week.

## 2018-05-02 NOTE — TELEPHONE ENCOUNTER
Call returned to patient: Patient states that his heart doctor recently started him on Metoprolol 25 mg QD and Eliquis 5 mg BID. Patient denies any recent illness or dehydration and verbalize understanding to repeat BMP level next week. Order placed.

## 2018-05-02 NOTE — TELEPHONE ENCOUNTER
Call placed to patient: No answer. Voice message left with information and instructions below. Will try back.

## 2018-05-04 ENCOUNTER — TELEPHONE (OUTPATIENT)
Dept: PHARMACY | Facility: CLINIC | Age: 38
End: 2018-05-04

## 2018-05-08 DIAGNOSIS — Z94.0 KIDNEY REPLACED BY TRANSPLANT: ICD-10-CM

## 2018-05-08 DIAGNOSIS — Z94.0 S/P KIDNEY TRANSPLANT: ICD-10-CM

## 2018-05-08 DIAGNOSIS — Z94.0 KIDNEY TRANSPLANTED: ICD-10-CM

## 2018-05-08 DIAGNOSIS — Z94.4 LIVER REPLACED BY TRANSPLANT (H): ICD-10-CM

## 2018-05-08 LAB
ANION GAP SERPL CALCULATED.3IONS-SCNC: 12 MMOL/L (ref 3–14)
BKV DNA # SPEC NAA+PROBE: ABNORMAL COPIES/ML
BKV DNA SPEC NAA+PROBE-LOG#: ABNORMAL LOG COPIES/ML
BUN SERPL-MCNC: 17 MG/DL (ref 7–30)
CALCIUM SERPL-MCNC: 9.5 MG/DL (ref 8.5–10.1)
CHLORIDE SERPL-SCNC: 107 MMOL/L (ref 94–109)
CO2 SERPL-SCNC: 22 MMOL/L (ref 20–32)
CREAT SERPL-MCNC: 1.34 MG/DL (ref 0.66–1.25)
GFR SERPL CREATININE-BSD FRML MDRD: 60 ML/MIN/1.7M2
GLUCOSE SERPL-MCNC: 112 MG/DL (ref 70–99)
POTASSIUM SERPL-SCNC: 4.1 MMOL/L (ref 3.4–5.3)
SODIUM SERPL-SCNC: 141 MMOL/L (ref 133–144)
SPECIMEN SOURCE: ABNORMAL

## 2018-05-08 PROCEDURE — 80048 BASIC METABOLIC PNL TOTAL CA: CPT | Performed by: INTERNAL MEDICINE

## 2018-05-08 PROCEDURE — 36415 COLL VENOUS BLD VENIPUNCTURE: CPT | Performed by: INTERNAL MEDICINE

## 2018-05-08 PROCEDURE — 87799 DETECT AGENT NOS DNA QUANT: CPT | Performed by: INTERNAL MEDICINE

## 2018-05-15 DIAGNOSIS — Z94.4 LIVER REPLACED BY TRANSPLANT (H): ICD-10-CM

## 2018-05-15 DIAGNOSIS — Z94.0 S/P KIDNEY TRANSPLANT: ICD-10-CM

## 2018-05-15 DIAGNOSIS — Z94.0 KIDNEY TRANSPLANTED: ICD-10-CM

## 2018-05-15 LAB
ANION GAP SERPL CALCULATED.3IONS-SCNC: 11 MMOL/L (ref 3–14)
BUN SERPL-MCNC: 20 MG/DL (ref 7–30)
CALCIUM SERPL-MCNC: 9.2 MG/DL (ref 8.5–10.1)
CHLORIDE SERPL-SCNC: 107 MMOL/L (ref 94–109)
CO2 SERPL-SCNC: 22 MMOL/L (ref 20–32)
CREAT SERPL-MCNC: 1.34 MG/DL (ref 0.66–1.25)
ERYTHROCYTE [DISTWIDTH] IN BLOOD BY AUTOMATED COUNT: 13.6 % (ref 10–15)
GFR SERPL CREATININE-BSD FRML MDRD: 60 ML/MIN/1.7M2
GLUCOSE SERPL-MCNC: 90 MG/DL (ref 70–99)
HCT VFR BLD AUTO: 45.6 % (ref 40–53)
HGB BLD-MCNC: 15.6 G/DL (ref 13.3–17.7)
MCH RBC QN AUTO: 30.4 PG (ref 26.5–33)
MCHC RBC AUTO-ENTMCNC: 34.2 G/DL (ref 31.5–36.5)
MCV RBC AUTO: 89 FL (ref 78–100)
PLATELET # BLD AUTO: 142 10E9/L (ref 150–450)
POTASSIUM SERPL-SCNC: 4.2 MMOL/L (ref 3.4–5.3)
RBC # BLD AUTO: 5.14 10E12/L (ref 4.4–5.9)
SODIUM SERPL-SCNC: 140 MMOL/L (ref 133–144)
TACROLIMUS BLD-MCNC: 4.8 UG/L (ref 5–15)
TME LAST DOSE: ABNORMAL H
WBC # BLD AUTO: 5.5 10E9/L (ref 4–11)

## 2018-05-15 PROCEDURE — 85027 COMPLETE CBC AUTOMATED: CPT | Performed by: INTERNAL MEDICINE

## 2018-05-15 PROCEDURE — 87799 DETECT AGENT NOS DNA QUANT: CPT | Performed by: INTERNAL MEDICINE

## 2018-05-15 PROCEDURE — 80197 ASSAY OF TACROLIMUS: CPT | Performed by: INTERNAL MEDICINE

## 2018-05-15 PROCEDURE — 36415 COLL VENOUS BLD VENIPUNCTURE: CPT | Performed by: INTERNAL MEDICINE

## 2018-05-15 PROCEDURE — 80048 BASIC METABOLIC PNL TOTAL CA: CPT | Performed by: INTERNAL MEDICINE

## 2018-05-16 ENCOUNTER — TELEPHONE (OUTPATIENT)
Dept: TRANSPLANT | Facility: CLINIC | Age: 38
End: 2018-05-16

## 2018-05-16 NOTE — TELEPHONE ENCOUNTER
Creatinine above baseline.  Recent BK viremia pending.    Please ensure patient is well hydrated, has not had any recent change in medications or recent illness.    Drinks about 4L daily, no fevers, no pain over graft.   Started on eliquis and metoprolol within the last month, consistent with increased creatinine.

## 2018-05-17 NOTE — TELEPHONE ENCOUNTER
Biopsy threshold per Dr. Sidhu Cr >1.4      RE: elevated creatinine  Received: Yesterday       Jake Sidhu MD Schindelholz, Alanna, RN Cc: Karla Morton RN                   Let's set a biopsy threshold of creatinine >1.4.     Thanks.

## 2018-05-18 LAB
BKV DNA # SPEC NAA+PROBE: 5356 COPIES/ML
BKV DNA SPEC NAA+PROBE-LOG#: 3.7 LOG COPIES/ML
SPECIMEN SOURCE: ABNORMAL

## 2018-05-29 DIAGNOSIS — Z94.4 LIVER REPLACED BY TRANSPLANT (H): ICD-10-CM

## 2018-05-29 DIAGNOSIS — Z94.0 KIDNEY TRANSPLANTED: ICD-10-CM

## 2018-05-29 DIAGNOSIS — Z94.0 S/P KIDNEY TRANSPLANT: ICD-10-CM

## 2018-05-29 LAB
ANION GAP SERPL CALCULATED.3IONS-SCNC: 8 MMOL/L (ref 3–14)
BUN SERPL-MCNC: 18 MG/DL (ref 7–30)
CALCIUM SERPL-MCNC: 9.3 MG/DL (ref 8.5–10.1)
CHLORIDE SERPL-SCNC: 105 MMOL/L (ref 94–109)
CO2 SERPL-SCNC: 27 MMOL/L (ref 20–32)
CREAT SERPL-MCNC: 1.36 MG/DL (ref 0.66–1.25)
ERYTHROCYTE [DISTWIDTH] IN BLOOD BY AUTOMATED COUNT: 13.5 % (ref 10–15)
GFR SERPL CREATININE-BSD FRML MDRD: 59 ML/MIN/1.7M2
GLUCOSE SERPL-MCNC: 91 MG/DL (ref 70–99)
HCT VFR BLD AUTO: 44.1 % (ref 40–53)
HGB BLD-MCNC: 14.8 G/DL (ref 13.3–17.7)
MCH RBC QN AUTO: 30 PG (ref 26.5–33)
MCHC RBC AUTO-ENTMCNC: 33.6 G/DL (ref 31.5–36.5)
MCV RBC AUTO: 90 FL (ref 78–100)
PLATELET # BLD AUTO: 133 10E9/L (ref 150–450)
POTASSIUM SERPL-SCNC: 4.2 MMOL/L (ref 3.4–5.3)
RBC # BLD AUTO: 4.93 10E12/L (ref 4.4–5.9)
SODIUM SERPL-SCNC: 140 MMOL/L (ref 133–144)
TACROLIMUS BLD-MCNC: 4.1 UG/L (ref 5–15)
TME LAST DOSE: ABNORMAL H
WBC # BLD AUTO: 5.1 10E9/L (ref 4–11)

## 2018-05-29 PROCEDURE — 36415 COLL VENOUS BLD VENIPUNCTURE: CPT | Performed by: INTERNAL MEDICINE

## 2018-05-29 PROCEDURE — 80048 BASIC METABOLIC PNL TOTAL CA: CPT | Performed by: INTERNAL MEDICINE

## 2018-05-29 PROCEDURE — 80197 ASSAY OF TACROLIMUS: CPT | Performed by: INTERNAL MEDICINE

## 2018-05-29 PROCEDURE — 85027 COMPLETE CBC AUTOMATED: CPT | Performed by: INTERNAL MEDICINE

## 2018-06-12 DIAGNOSIS — Z94.4 LIVER REPLACED BY TRANSPLANT (H): ICD-10-CM

## 2018-06-12 DIAGNOSIS — E89.0 POSTOPERATIVE HYPOTHYROIDISM: ICD-10-CM

## 2018-06-12 DIAGNOSIS — C73 PAPILLARY THYROID CARCINOMA (H): ICD-10-CM

## 2018-06-12 DIAGNOSIS — Z94.0 KIDNEY TRANSPLANTED: ICD-10-CM

## 2018-06-12 LAB
ALBUMIN SERPL-MCNC: 4 G/DL (ref 3.4–5)
ALP SERPL-CCNC: 77 U/L (ref 40–150)
ALT SERPL W P-5'-P-CCNC: 23 U/L (ref 0–70)
ANION GAP SERPL CALCULATED.3IONS-SCNC: 6 MMOL/L (ref 3–14)
AST SERPL W P-5'-P-CCNC: 17 U/L (ref 0–45)
BILIRUB DIRECT SERPL-MCNC: 0.1 MG/DL (ref 0–0.2)
BILIRUB SERPL-MCNC: 0.5 MG/DL (ref 0.2–1.3)
BUN SERPL-MCNC: 20 MG/DL (ref 7–30)
CALCIUM SERPL-MCNC: 9.1 MG/DL (ref 8.5–10.1)
CHLORIDE SERPL-SCNC: 107 MMOL/L (ref 94–109)
CO2 SERPL-SCNC: 27 MMOL/L (ref 20–32)
CREAT SERPL-MCNC: 1.31 MG/DL (ref 0.66–1.25)
ERYTHROCYTE [DISTWIDTH] IN BLOOD BY AUTOMATED COUNT: 13.5 % (ref 10–15)
GFR SERPL CREATININE-BSD FRML MDRD: 61 ML/MIN/1.7M2
GLUCOSE SERPL-MCNC: 93 MG/DL (ref 70–99)
HCT VFR BLD AUTO: 44.9 % (ref 40–53)
HGB BLD-MCNC: 15.4 G/DL (ref 13.3–17.7)
MAGNESIUM SERPL-MCNC: 1.8 MG/DL (ref 1.6–2.3)
MCH RBC QN AUTO: 30.7 PG (ref 26.5–33)
MCHC RBC AUTO-ENTMCNC: 34.3 G/DL (ref 31.5–36.5)
MCV RBC AUTO: 89 FL (ref 78–100)
PLATELET # BLD AUTO: 125 10E9/L (ref 150–450)
POTASSIUM SERPL-SCNC: 4.2 MMOL/L (ref 3.4–5.3)
PROT SERPL-MCNC: 7.1 G/DL (ref 6.8–8.8)
RBC # BLD AUTO: 5.02 10E12/L (ref 4.4–5.9)
SODIUM SERPL-SCNC: 140 MMOL/L (ref 133–144)
T4 FREE SERPL-MCNC: 1.08 NG/DL (ref 0.76–1.46)
TACROLIMUS BLD-MCNC: 4.4 UG/L (ref 5–15)
TME LAST DOSE: ABNORMAL H
TSH SERPL DL<=0.005 MIU/L-ACNC: 0.94 MU/L (ref 0.4–4)
WBC # BLD AUTO: 5.4 10E9/L (ref 4–11)

## 2018-06-12 PROCEDURE — 84443 ASSAY THYROID STIM HORMONE: CPT | Performed by: INTERNAL MEDICINE

## 2018-06-12 PROCEDURE — 87799 DETECT AGENT NOS DNA QUANT: CPT | Performed by: INTERNAL MEDICINE

## 2018-06-12 PROCEDURE — 36415 COLL VENOUS BLD VENIPUNCTURE: CPT | Performed by: INTERNAL MEDICINE

## 2018-06-12 PROCEDURE — 84439 ASSAY OF FREE THYROXINE: CPT | Performed by: INTERNAL MEDICINE

## 2018-06-12 PROCEDURE — 80076 HEPATIC FUNCTION PANEL: CPT | Performed by: INTERNAL MEDICINE

## 2018-06-12 PROCEDURE — 85027 COMPLETE CBC AUTOMATED: CPT | Performed by: INTERNAL MEDICINE

## 2018-06-12 PROCEDURE — 80048 BASIC METABOLIC PNL TOTAL CA: CPT | Performed by: INTERNAL MEDICINE

## 2018-06-12 PROCEDURE — 83735 ASSAY OF MAGNESIUM: CPT | Performed by: INTERNAL MEDICINE

## 2018-06-12 PROCEDURE — 80197 ASSAY OF TACROLIMUS: CPT | Performed by: INTERNAL MEDICINE

## 2018-06-14 LAB
BKV DNA # SPEC NAA+PROBE: ABNORMAL COPIES/ML
BKV DNA SPEC NAA+PROBE-LOG#: 4.2 LOG COPIES/ML
SPECIMEN SOURCE: ABNORMAL

## 2018-06-21 ENCOUNTER — OFFICE VISIT (OUTPATIENT)
Dept: INTERNAL MEDICINE | Facility: CLINIC | Age: 38
End: 2018-06-21
Payer: COMMERCIAL

## 2018-06-21 VITALS
DIASTOLIC BLOOD PRESSURE: 78 MMHG | SYSTOLIC BLOOD PRESSURE: 130 MMHG | WEIGHT: 194.9 LBS | HEART RATE: 72 BPM | BODY MASS INDEX: 27.97 KG/M2

## 2018-06-21 DIAGNOSIS — R36.1 HEMATOSPERMIA: ICD-10-CM

## 2018-06-21 DIAGNOSIS — R36.1 HEMATOSPERMIA: Primary | ICD-10-CM

## 2018-06-21 LAB
ALBUMIN UR-MCNC: NEGATIVE MG/DL
APPEARANCE UR: CLEAR
BILIRUB UR QL STRIP: NEGATIVE
COLOR UR AUTO: NORMAL
GLUCOSE UR STRIP-MCNC: NEGATIVE MG/DL
HGB UR QL STRIP: NEGATIVE
KETONES UR STRIP-MCNC: NEGATIVE MG/DL
LEUKOCYTE ESTERASE UR QL STRIP: NEGATIVE
NITRATE UR QL: NEGATIVE
PH UR STRIP: 6 PH (ref 5–7)
RBC #/AREA URNS AUTO: <1 /HPF (ref 0–2)
SOURCE: NORMAL
SP GR UR STRIP: 1 (ref 1–1.03)
UROBILINOGEN UR STRIP-MCNC: 0 MG/DL (ref 0–2)
WBC #/AREA URNS AUTO: <1 /HPF (ref 0–5)

## 2018-06-21 ASSESSMENT — PAIN SCALES - GENERAL: PAINLEVEL: NO PAIN (0)

## 2018-06-21 NOTE — PATIENT INSTRUCTIONS
[facility], [place]: [phone]     [facility] Medication Refill Request Information:  * Please contact your pharmacy regarding ANY request for medication refills.  ** [facility]ax = [phone]  * Please allow 3 business days for routine medication refills.  * Please allow 5 business days for controlled substance medication refills.     [facility] Test Result notification information:  *You will be notified with in 7-10 days of your appointment day regarding the results of your test.  If you are on MyChart you will be notified as soon as the provider has reviewed the results and signed off on them.      You were seen today for brown flecks in your semen.  This is likely old blood from old trauma to your prostate.  We have ordered a urine sample and semen sample to look further into this.  This will likely resolve on its own, but we would like to ensure we do a full evaluation.      If your semen continues to have brown flecks in it, or if you develop blood in the semen or testicular or prostate pain, we would recommend a referral to the urologist, which we can make for you.      I will send results of the tests ordered today to you via Back&.

## 2018-06-21 NOTE — MR AVS SNAPSHOT
After Visit Summary   6/21/2018    Cricket Chen    MRN: [id number]           Patient Information     Date Of Birth          1980        Visit Information        Provider Department      6/21/2018 3:05 PM Paul Gonzalez MD [facility]        Today's Diagnoses     Hematospermia    -  1      Care Instructions    [facility]: [phone]     Primary Care Center Medication Refill Request Information:  * Please contact your pharmacy regarding ANY request for medication refills.  ** [facility] Prescription Fax = [phone]  * Please allow 3 business days for routine medication refills.  * Please allow 5 business days for controlled substance medication refills.     Primary Care Center Test Result notification information:  *You will be notified with in 7-10 days of your appointment day regarding the results of your test.  If you are on MyChart you will be notified as soon as the provider has reviewed the results and signed off on them.      You were seen today for brown flecks in your semen.  This is likely old blood from old trauma to your prostate.  We have ordered a urine sample and semen sample to look further into this.  This will likely resolve on its own, but we would like to ensure we do a full evaluation.      If your semen continues to have brown flecks in it, or if you develop blood in the semen or testicular or prostate pain, we would recommend a referral to the urologist, which we can make for you.      I will send results of the tests ordered today to you via [employer].           Follow-ups after your visit        Your next 10 appointments already scheduled     Jun 26, 2018 11:15 AM CDT   LAB with  LAB   [facility] [place] ([facility] [place]    [address]  [place] [address] [phone]           Please do not eat 10-12 hours before your appointment if you are coming in fasting for labs on lipids, cholesterol, or glucose  (sugar). This does not apply to pregnant women. Water, hot tea and black coffee (with nothing added) are okay. Do not drink other fluids, diet soda or chew gum.            Jul 24, 2018 11:00 AM CDT   (Arrive by 10:45 AM)   RETURN CONGENITAL HEART with Jaylen George MD   [facility] ([facility])    [address]  [address]  [place] [address]   [phone]            Jul 25, 2018 10:00 AM CDT   (Arrive by 9:45 AM)   New Plastic Surgery with NANCI Gannon MD   [facility]astic [facility] Reconstruct[facility] Surgery ([facility])    [address]  4th Floor  [place] [id number] [phone]           Do not wear perfume.            Aug 02, 2018  1:00 PM CDT   (Arrive by 12:45 PM)   New Patient Visit with Yuval Dobbins MD   [facility] Dermatology ([facility])    [address]  3rd Floor  [place] [address]   [phone]            Aug 06, 2018  8:30 AM CDT   [facility] Lab Draw with  [facility] LAB DRAW   [facility] Lab Draw ([facility])    [address]  [address]  [place] [address]   [phone]            Aug 06, 2018  9:00 AM CDT   (Arrive by 8:45 AM)   Return Visit with Madhav Moody MD   [facility] ([facility])    [address]  [address]  [place] [address]   [phone]            Aug 06, 2018  2:20 PM CDT   (Arrive by 1:50 PM)   Return Kidney Transplant with  Kidney/Pancreas Recipient   [facility] Nephrology ([facility])    [address]  [address]  [place] [address]   [phone]            Aug 10, 2018  1:50 PM CDT   (Arrive by 1:35 PM)   RETURN ENDOCRINE with Zuleyma Gray MD   [facility] Endocrinology ([facility])    [address]  3rd Floor  [place] [address]   [phone]            Jan  15, 2019 12:10 PM CST   (Arrive by 11:55 AM)   Return Liver Transplant with Laureen Galvan MD   [facility] ([facility])    [address]  [address]  [place] [address] [phone]            George 15, 2019  1:05 PM CST   (Arrive by 12:35 PM)   Return Kidney Transplant with Uc Kidney/Pancreas Recipient   [facility] Nephrology ([facility])    [address]  [address]  [place] [address] [phone]              Future tests that were ordered for you today     Open Future Orders        Priority Expected Expires Ordered    UA with Micro reflex to Culture Routine 6/21/2018 6/21/2019 6/21/2018    Semen analysis incl diff Routine  6/22/2019 6/21/2018            Who to contact     Please call your clinic at [phone] to:    Ask questions about your health    Make or cancel appointments    Discuss your medicines    Learn about your test results    Speak to your doctor            Additional Information About Your Visit        PushPoint Information     PushPoint gives you secure access to your electronic health record. If you see a primary care provider, you can also send messages to your care team and make appointments. If you have questions, please call your primary care clinic.  If you do not have a primary care provider, please call [phone] and they will assist you.      PushPoint is an electronic gateway that provides easy, online access to your medical records. With [employer]Point, you can request a clinic appointment, read your test results, renew a prescription or communicate with your care team.     To access your existing account, please contact your [facility] or call [phone] for assistance.        Care EveryWhere ID     This is your Care EveryWhere ID. This could be used by other organizations to access your [place] medical records  [id number]        Your Vitals Were     Pulse BMI  (Body Mass Index)                72 27.97 kg/m2           Blood Pressure from Last 3 Encounters:   06/21/18 130/78   04/13/18 126/83   03/30/18 126/84    Weight from Last 3 Encounters:   06/21/18 88.4 kg (194 lb 14.4 oz)   04/13/18 91.8 kg (202 lb 6.1 oz)   03/30/18 94.6 kg (208 lb 9.6 oz)               Primary Care Provider    [facility]       No address on file        Equal Access to Services     CLAY VIDES : Hadii aad ku hadasho Soomaali, waaxda luqadaha, qaybta kaalmada adeegyada, robina sibley. So [facility] [phone].    ATENCIÓN: Si habla español, tiene a laura disposición servicios gratuitos de asistencia lingüística. Llame al [phone].    We comply with applicable federal civil rights laws and [place] laws. We do not discriminate on the basis of race, color, national origin, age, disability, sex, sexual orientation, or gender identity.            Thank you!     Thank you for choosing [facility]  for your care. Our goal is always to provide you with excellent care. Hearing back from our patients is one way we can continue to improve our services. Please take a few minutes to complete the written survey that you may receive in the mail after your visit with us. Thank you!             Your Updated Medication List - Protect others around you: Learn how to safely use, store and throw away your medicines at www.disposemymeds.org.          This list is accurate as of 6/21/18  3:42 PM.  Always use your most recent med list.                   Brand Name Dispense Instructions for use Diagnosis    amLODIPine 10 MG tablet    NORVASC    90 tablet    TAKE ONE TABLET BY MOUTH EVERY DAY    HTN (hypertension)       apixaban ANTICOAGULANT 5 MG tablet    ELIQUIS    60 tablet    Take 1 tablet (5 mg) by mouth 2 times daily    Atrial flutter, unspecified type (H)       levothyroxine 137 MCG tablet    SYNTHROID/LEVOTHROID    90 tablet    Take 1 tablet (137  mcg) by mouth daily    Papillary thyroid carcinoma (H), Postoperative hypothyroidism       lisinopril 5 MG tablet    PRINIVIL/ZESTRIL    90 tablet    TAKE ONE TABLET BY MOUTH EVERY DAY    HTN (hypertension)       magnesium oxide 400 (241.3 Mg) MG tablet    MAG-OX    120 tablet    Take 1 tablet (400 mg) by mouth 2 times daily    Hypomagnesemia       metoprolol succinate 25 MG 24 hr tablet    TOPROL-XL    90 tablet    Take 1 tablet (25 mg) by mouth daily    Atrial flutter, unspecified type (H)       mycophenolate 250 MG capsule    GENERIC EQUIVALENT    60 capsule    Take 1 capsule (250 mg) by mouth 2 times daily    Kidney replaced by transplant       predniSONE 5 MG tablet    DELTASONE    90 tablet    Take 1 tablet (5 mg) by mouth daily    Liver replaced by transplant (H), S/P kidney transplant       sulfamethoxazole-trimethoprim 400-80 MG per tablet    BACTRIM/SEPTRA    30 tablet    Take 1 tablet by mouth daily    Liver replaced by transplant (H), S/P kidney transplant       * tacrolimus 1 MG capsule    GENERIC EQUIVALENT    60 capsule    Take 1 capsule (1 mg) by mouth 2 times daily 1.5 mg twice per day.    Liver replaced by transplant (H), S/P kidney transplant       * tacrolimus 0.5 MG capsule    GENERIC EQUIVALENT    60 capsule    Take 1 capsule (0.5 mg) by mouth 2 times daily 1.5 mg twice per day.    S/P kidney transplant       * Notice:  This list has 2 medication(s) that are the same as other medications prescribed for you. Read the directions carefully, and ask your doctor or other care provider to review them with you.

## 2018-06-21 NOTE — NURSING NOTE
Chief Complaint   Patient presents with     Sexual Problem     Patient here for sexual problem. Patient states he is having brown specs in his semen.       Rosalie Dunbar CMA at 2:53 PM on 6/21/2018.

## 2018-06-22 ENCOUNTER — MYC MEDICAL ADVICE (OUTPATIENT)
Dept: INTERNAL MEDICINE | Facility: CLINIC | Age: 38
End: 2018-06-22

## 2018-06-26 DIAGNOSIS — Z94.4 LIVER TRANSPLANTED (H): ICD-10-CM

## 2018-06-26 DIAGNOSIS — Z94.0 S/P KIDNEY TRANSPLANT: ICD-10-CM

## 2018-06-26 DIAGNOSIS — Z94.0 KIDNEY REPLACED BY TRANSPLANT: ICD-10-CM

## 2018-06-26 DIAGNOSIS — Z94.4 LIVER REPLACED BY TRANSPLANT (H): ICD-10-CM

## 2018-06-26 DIAGNOSIS — Z94.0 KIDNEY TRANSPLANTED: ICD-10-CM

## 2018-06-26 LAB
ANION GAP SERPL CALCULATED.3IONS-SCNC: 8 MMOL/L (ref 3–14)
BUN SERPL-MCNC: 20 MG/DL (ref 7–30)
CALCIUM SERPL-MCNC: 9.3 MG/DL (ref 8.5–10.1)
CHLORIDE SERPL-SCNC: 104 MMOL/L (ref 94–109)
CO2 SERPL-SCNC: 27 MMOL/L (ref 20–32)
CREAT SERPL-MCNC: 1.43 MG/DL (ref 0.66–1.25)
ERYTHROCYTE [DISTWIDTH] IN BLOOD BY AUTOMATED COUNT: 13.5 % (ref 10–15)
GFR SERPL CREATININE-BSD FRML MDRD: 55 ML/MIN/1.7M2
GLUCOSE SERPL-MCNC: 86 MG/DL (ref 70–99)
HCT VFR BLD AUTO: 45.3 % (ref 40–53)
HGB BLD-MCNC: 15.5 G/DL (ref 13.3–17.7)
MCH RBC QN AUTO: 30.6 PG (ref 26.5–33)
MCHC RBC AUTO-ENTMCNC: 34.2 G/DL (ref 31.5–36.5)
MCV RBC AUTO: 90 FL (ref 78–100)
PLATELET # BLD AUTO: 149 10E9/L (ref 150–450)
POTASSIUM SERPL-SCNC: 3.9 MMOL/L (ref 3.4–5.3)
RBC # BLD AUTO: 5.06 10E12/L (ref 4.4–5.9)
SODIUM SERPL-SCNC: 139 MMOL/L (ref 133–144)
TACROLIMUS BLD-MCNC: 3.9 UG/L (ref 5–15)
TME LAST DOSE: ABNORMAL H
WBC # BLD AUTO: 5.4 10E9/L (ref 4–11)

## 2018-06-26 PROCEDURE — 36415 COLL VENOUS BLD VENIPUNCTURE: CPT | Performed by: INTERNAL MEDICINE

## 2018-06-26 PROCEDURE — 80048 BASIC METABOLIC PNL TOTAL CA: CPT | Performed by: INTERNAL MEDICINE

## 2018-06-26 PROCEDURE — 85027 COMPLETE CBC AUTOMATED: CPT | Performed by: INTERNAL MEDICINE

## 2018-06-26 PROCEDURE — 87799 DETECT AGENT NOS DNA QUANT: CPT | Performed by: INTERNAL MEDICINE

## 2018-06-26 PROCEDURE — 80197 ASSAY OF TACROLIMUS: CPT | Performed by: INTERNAL MEDICINE

## 2018-06-27 LAB
BKV DNA # SPEC NAA+PROBE: ABNORMAL COPIES/ML
BKV DNA SPEC NAA+PROBE-LOG#: 4 LOG COPIES/ML
SPECIMEN SOURCE: ABNORMAL

## 2018-06-27 NOTE — PROGRESS NOTES
PRIMARY CARE CENTER       SUBJECTIVE:  Cricket Chen is a 37 year old male with a PMHx of liver/kidney transplant on chronic immune suppression, complete transposition of great vessels, remote history of EtOH abuse and PTLD in remission  who comes in for evaluation of brown flecks in semen.      He first noted brown spots in his semen about 3 weeks ago.  This occurred at the for the past 3 times with ejaculation.  Last known normal semen without brown flecks was 4 weeks ago per his report.  He did have this happen once 6 years ago which was notable for blood rather than brown flecks.  He denies testicular penile or groin pain.  He states his semen volume is the same.  He does not have pain with ejaculation.  Or dysuria.  He has no hematuria.  He has no history of sexually transmitted infections, and has not been sexually active for the past 5 years.  He denies perineal pain or fullness.  He denies fevers, chills, night sweats, nausea, vomiting, testicular pain, pain with defecation, black or bloody stools.  He also denies flank pain.  He denies trauma to his testicles or rectum or perineal area or new groin/testicular swelling/hernias.      He denies other complaints at this time.    Medications and allergies reviewed by me today.     ROS:   Constitutional, neuro, ENT, endocrine, pulmonary, cardiac, gastrointestinal, genitourinary, musculoskeletal, integument and psychiatric systems are negative, except as otherwise noted.    OBJECTIVE:    /78  Pulse 72  Wt 88.4 kg (194 lb 14.4 oz)  BMI 27.97 kg/m2   Wt Readings from Last 1 Encounters:   06/21/18 88.4 kg (194 lb 14.4 oz)       GENERAL APPEARANCE: healthy, alert and no distress     EYES: anicteric, PERRL     HENT:  nose and mouth without ulcers or lesions     NECK: no asymmetry or scars     RESP: lungs clear to auscultation - no rales, rhonchi or wheezes, normal WOB on RA      CV: regular rates and rhythm, normal S1 S2, no S3 or S4 and  no murmur, click or rub     ABDOMEN:  soft, nontender, no masses and bowel sounds normal     MS: extremities normal- no gross deformities noted, no evidence of inflammation in joints     SKIN: no suspicious lesions or rashes on exposed areas of skin      NEURO: CNII-XII grossly intact, normal gait, mentation intact and speech normal     PSYCH: mentation appears normal. and affect normal/bright     LYMPHATICS: No inguinal adenopathy     : decreased testicular size and density, no masses bilaterally, no testicular tenderness or swelling noted, normal penis without urethral exudate/rash or erosions.  Normal sized prostate without bogginess or tenderness on MADISON.       ASSESSMENT/PLAN:    Cricket was seen today for sexual problem.    Diagnoses and all orders for this visit:    Hematospermia.  Likely due to microtrauma of testicles or prostate.  No signs/symptoms of systemic infection.  He does not have prostate tenderness or bogginess, so low concern for prostatitis.  He denies trauma to his penis, testicles, and rectum.  He has no other signs of infection or STI.  Will get UA, which was benign on day of exam.  Pending semen analysis at the time of note.  If symptoms persist with continued brown flecks in semen, will refer to urology for further workup.  -     UA with Micro reflex to Culture; Future  -     Semen analysis incl diff; Future    Pt should return to clinic for f/u as needed.     Paul Gonzalez MD  PGY-3, IM  Jun 21, 2018    Pt's plan of care was discussed with Dr. Moser under the primary care exception.    While the patient was in clinic, I reviewed the pertinent medical history and results.  I discussed the current findings on physical examination, as well as the patient s diagnosis and treatment plan with the resident and agree with the information as documented with the following exceptions: none.

## 2018-07-10 DIAGNOSIS — Z94.4 LIVER REPLACED BY TRANSPLANT (H): ICD-10-CM

## 2018-07-10 DIAGNOSIS — Z94.0 KIDNEY TRANSPLANTED: ICD-10-CM

## 2018-07-10 DIAGNOSIS — Z94.0 S/P KIDNEY TRANSPLANT: ICD-10-CM

## 2018-07-10 LAB
ANION GAP SERPL CALCULATED.3IONS-SCNC: 9 MMOL/L (ref 3–14)
BUN SERPL-MCNC: 18 MG/DL (ref 7–30)
CALCIUM SERPL-MCNC: 9.4 MG/DL (ref 8.5–10.1)
CHLORIDE SERPL-SCNC: 106 MMOL/L (ref 94–109)
CO2 SERPL-SCNC: 25 MMOL/L (ref 20–32)
CREAT SERPL-MCNC: 1.44 MG/DL (ref 0.66–1.25)
ERYTHROCYTE [DISTWIDTH] IN BLOOD BY AUTOMATED COUNT: 13.2 % (ref 10–15)
GFR SERPL CREATININE-BSD FRML MDRD: 55 ML/MIN/1.7M2
GLUCOSE SERPL-MCNC: 94 MG/DL (ref 70–99)
HCT VFR BLD AUTO: 44.3 % (ref 40–53)
HGB BLD-MCNC: 15.3 G/DL (ref 13.3–17.7)
MCH RBC QN AUTO: 31 PG (ref 26.5–33)
MCHC RBC AUTO-ENTMCNC: 34.5 G/DL (ref 31.5–36.5)
MCV RBC AUTO: 90 FL (ref 78–100)
PLATELET # BLD AUTO: 120 10E9/L (ref 150–450)
POTASSIUM SERPL-SCNC: 4.1 MMOL/L (ref 3.4–5.3)
RBC # BLD AUTO: 4.94 10E12/L (ref 4.4–5.9)
SODIUM SERPL-SCNC: 140 MMOL/L (ref 133–144)
TACROLIMUS BLD-MCNC: 4.6 UG/L (ref 5–15)
TME LAST DOSE: ABNORMAL H
WBC # BLD AUTO: 5.1 10E9/L (ref 4–11)

## 2018-07-10 PROCEDURE — 80048 BASIC METABOLIC PNL TOTAL CA: CPT | Performed by: INTERNAL MEDICINE

## 2018-07-10 PROCEDURE — 80197 ASSAY OF TACROLIMUS: CPT | Performed by: INTERNAL MEDICINE

## 2018-07-10 PROCEDURE — 36415 COLL VENOUS BLD VENIPUNCTURE: CPT | Performed by: INTERNAL MEDICINE

## 2018-07-10 PROCEDURE — 85027 COMPLETE CBC AUTOMATED: CPT | Performed by: INTERNAL MEDICINE

## 2018-07-11 ASSESSMENT — ENCOUNTER SYMPTOMS
HEMATURIA: 0
DYSURIA: 0
FLANK PAIN: 0
DIFFICULTY URINATING: 0

## 2018-07-16 DIAGNOSIS — Z94.0 S/P KIDNEY TRANSPLANT: ICD-10-CM

## 2018-07-16 DIAGNOSIS — Z94.4 LIVER REPLACED BY TRANSPLANT (H): ICD-10-CM

## 2018-07-16 RX ORDER — PREDNISONE 5 MG/1
TABLET ORAL
Qty: 90 TABLET | Refills: 3 | Status: SHIPPED | OUTPATIENT
Start: 2018-07-16 | End: 2018-08-06

## 2018-07-23 DIAGNOSIS — Z94.0 KIDNEY REPLACED BY TRANSPLANT: ICD-10-CM

## 2018-07-23 DIAGNOSIS — Z94.4 LIVER TRANSPLANTED (H): ICD-10-CM

## 2018-07-23 DIAGNOSIS — Z94.0 KIDNEY TRANSPLANTED: ICD-10-CM

## 2018-07-23 LAB
ANION GAP SERPL CALCULATED.3IONS-SCNC: 4 MMOL/L (ref 3–14)
BUN SERPL-MCNC: 18 MG/DL (ref 7–30)
CALCIUM SERPL-MCNC: 9.4 MG/DL (ref 8.5–10.1)
CHLORIDE SERPL-SCNC: 107 MMOL/L (ref 94–109)
CO2 SERPL-SCNC: 30 MMOL/L (ref 20–32)
CREAT SERPL-MCNC: 1.5 MG/DL (ref 0.66–1.25)
ERYTHROCYTE [DISTWIDTH] IN BLOOD BY AUTOMATED COUNT: 13.4 % (ref 10–15)
GFR SERPL CREATININE-BSD FRML MDRD: 52 ML/MIN/1.7M2
GLUCOSE SERPL-MCNC: 91 MG/DL (ref 70–99)
HCT VFR BLD AUTO: 44.5 % (ref 40–53)
HGB BLD-MCNC: 15.3 G/DL (ref 13.3–17.7)
MCH RBC QN AUTO: 31 PG (ref 26.5–33)
MCHC RBC AUTO-ENTMCNC: 34.4 G/DL (ref 31.5–36.5)
MCV RBC AUTO: 90 FL (ref 78–100)
PLATELET # BLD AUTO: 126 10E9/L (ref 150–450)
POTASSIUM SERPL-SCNC: 4 MMOL/L (ref 3.4–5.3)
RBC # BLD AUTO: 4.93 10E12/L (ref 4.4–5.9)
SODIUM SERPL-SCNC: 141 MMOL/L (ref 133–144)
TACROLIMUS BLD-MCNC: 4.5 UG/L (ref 5–15)
TME LAST DOSE: ABNORMAL H
WBC # BLD AUTO: 4.9 10E9/L (ref 4–11)

## 2018-07-23 PROCEDURE — 85027 COMPLETE CBC AUTOMATED: CPT | Performed by: INTERNAL MEDICINE

## 2018-07-23 PROCEDURE — 80048 BASIC METABOLIC PNL TOTAL CA: CPT | Performed by: INTERNAL MEDICINE

## 2018-07-23 PROCEDURE — 87799 DETECT AGENT NOS DNA QUANT: CPT | Performed by: INTERNAL MEDICINE

## 2018-07-23 PROCEDURE — 80197 ASSAY OF TACROLIMUS: CPT | Performed by: INTERNAL MEDICINE

## 2018-07-23 PROCEDURE — 36415 COLL VENOUS BLD VENIPUNCTURE: CPT | Performed by: INTERNAL MEDICINE

## 2018-07-24 ENCOUNTER — OFFICE VISIT (OUTPATIENT)
Dept: CARDIOLOGY | Facility: CLINIC | Age: 38
End: 2018-07-24
Attending: INTERNAL MEDICINE
Payer: COMMERCIAL

## 2018-07-24 ENCOUNTER — TELEPHONE (OUTPATIENT)
Dept: TRANSPLANT | Facility: CLINIC | Age: 38
End: 2018-07-24

## 2018-07-24 VITALS
HEART RATE: 70 BPM | BODY MASS INDEX: 27.59 KG/M2 | WEIGHT: 192.7 LBS | OXYGEN SATURATION: 97 % | DIASTOLIC BLOOD PRESSURE: 87 MMHG | HEIGHT: 70 IN | SYSTOLIC BLOOD PRESSURE: 132 MMHG

## 2018-07-24 DIAGNOSIS — I48.92 ATRIAL FLUTTER, UNSPECIFIED TYPE (H): ICD-10-CM

## 2018-07-24 DIAGNOSIS — Z94.0 KIDNEY TRANSPLANTED: Primary | ICD-10-CM

## 2018-07-24 DIAGNOSIS — Q20.3 D-TGA (DEXTRO-TRANSPOSITION OF GREAT ARTERIES): Primary | ICD-10-CM

## 2018-07-24 PROCEDURE — 93005 ELECTROCARDIOGRAM TRACING: CPT | Mod: ZF

## 2018-07-24 PROCEDURE — 99214 OFFICE O/P EST MOD 30 MIN: CPT | Mod: ZP | Performed by: INTERNAL MEDICINE

## 2018-07-24 PROCEDURE — 93010 ELECTROCARDIOGRAM REPORT: CPT | Mod: ZP | Performed by: INTERNAL MEDICINE

## 2018-07-24 PROCEDURE — G0463 HOSPITAL OUTPT CLINIC VISIT: HCPCS | Mod: 25,ZF

## 2018-07-24 ASSESSMENT — PAIN SCALES - GENERAL: PAINLEVEL: NO PAIN (0)

## 2018-07-24 NOTE — PATIENT INSTRUCTIONS
"You were seen today in the Adult Congenital and Cardiovascular Genetics Clinic at the [facility].    Cardiology Providers you saw during your visit:  Dr. Jaylen George    Diagnosis:  D-TGA    Results:  No testing today    Recommendations:    1.  Continue to eat a heart healthy, low salt diet.  2.  Continue to get 20-30 minutes of aerobic activity, 4-5 days per week.  Examples of aerobic activity include walking, running, swimming, cycling, etc.  3.  Continue to observe good oral hygiene, with regular dental visits.  4.  No changes today.       Vitals:    07/24/18 1048   BP: 132/87   BP Location: Left arm   Patient Position: Chair   Cuff Size: Adult Regular   Pulse: 70   SpO2: 97%   Weight: 87.4 kg (192 lb 11.2 oz)   Height: 1.778 m (5' 10\")       Exercise restrictions:   Yes__X__  No____         If yes, list restrictions:  Must be allowed to rest if fatigued or SOB      Work restrictions:  Yes____  No__X__         If yes, list restrictions:    FASTING CHOLESTEROL was checked in the last 5 years YES_X__  NO___   Continue to eat a heart healthy, low salt diet.         ____ Fasting lipid panel order today         ____ No changes in medications          ____ I recommend the following changes in your cholesterol medications.:          ____ Please follow up for cholesterol screening at your primary care physician      Follow-up:  Follow up with Dr. George in March 2019    If you have questions or concerns please contact us at:    Isabel Victor RN, BSN   Gilbert Odell (Scheduling)  Nurse Coordinator     Clinic   Adult Congenital and CV Genetics Adult Congenital and CV Genetic  [facility]  (P)[phone]    (P) [phone]  [email or web address] (F)[phone]        For after hours urgent needs, call [phone] and ask to speak to the Adult Congenital Physician on call.  Mention Job Code 0401.    For " emergencies call 911.    [facility]  [facility]  Mail Code [id number]  [address] [place]  [address]

## 2018-07-24 NOTE — TELEPHONE ENCOUNTER
Creatinine remains elevated.     Message  Received: Yesterday       Yuval Khan MD Krull, Leisa K, RN                   Check UA with urine culture, hydrate, hold eliquis.  Recheck creatinine 1 week, if 1.5 or greater, get kidney transplant biopsy.     Thanks,     d

## 2018-07-24 NOTE — LETTER
7/24/2018      RE: Cricket Chen  [address]  [facility] [id number]       Dear Colleague,    Thank you for the opportunity to participate in the care of your patient, Cricket Chen, at the [facility] HEART [facility] at [facility]. Please see a copy of my visit note below.    [facility] Electrophysiology Clinic Note  HPI:   Mr. Chen is a 37 year old male who has a past medical history significant for dTGA s/p modified Shoemaker operation and stitch closure of small VSD 1981, AFL s/p DCCV and s/p ablation 8/2000, liver and kidney transplant 2016, post transplant lymphoproliferative disorder, papillary thyroid cancer s/p thyroidectomy, prior ETOH abuse (sober since 2014), anxiety, and depression. He presents today for follow up.     He presented to [facility] on 3/6/18 with shortness of breath and chest flutterings that started the day prior. He recalled it felt similar to when he was in AFL in the past. In the U, he was found to be in AFL. He was subsequently admitted. He was started on Eliquis and arranged for NEELIMA/DCCV. He had successful DCCV 3/7/18 and he was discharged. He states that he had AFL in the past. He believes he had a DCCV around age 7. He had also been maintained on Sotalol. He also reports a previous ablation at an OSH in 8/2000 (records not available to us at this time). He did well without AFL issues until 3/2018 when he had recurrence. Most recent echo from 3/30/18 showed systemic EF 35-40%, mild TR, and no evidence of baffle leaks/obstructions. A CMRI from OSH from 3/2017 showed no baffle  Obstruction and systemic EF 33%. [facility] He was doing well. He had not noted any further arrhythmias since his DCCV. He recently saw Dr. Alba who will have him do a CPX.     He presents today for follow up. He reports feeling well. No known recurrences of AFL. He denies any chest pain/pressures, dizziness, lightheadedness, worsening shortness of  breath, leg/ankle swelling, PND, orthopnea, palpitations, or syncopal symptoms. Presenting 12 lead ECG shows NSR IRBBB Vent Rate 65 bpm,  ms,  ms, QTc 432 ms. Current cardiac medications include: Toprol XL, Eliquis, Lisinopril, and Norvasc.     PAST MEDICAL HISTORY:  Past Medical History:   Diagnosis Date     Alcohol abuse     Last drink in Mid-April 2014     Anemia in ESRD (end-stage renal disease) (H)      Anxiety 2008     Atrial flutter (H)      Cirrhosis (H)     S/P liver transplant     Depression      History of blood transfusion      History of transposition of great vessels     atrial switch at age 8 months old     Hypertension      Liver transplant recipient (H)     2016     Papillary thyroid carcinoma (H)      Pneumonia 11-15-14     Renal transplant recipient     2016     Varices, esophageal (H)        CURRENT MEDICATIONS:  Current Outpatient Prescriptions   Medication Sig Dispense Refill     amLODIPine (NORVASC) 10 MG tablet TAKE ONE TABLET BY MOUTH EVERY DAY 90 tablet 3     apixaban ANTICOAGULANT (ELIQUIS) 5 MG tablet Take 1 tablet (5 mg) by mouth 2 times daily 60 tablet 3     levothyroxine (SYNTHROID/LEVOTHROID) 137 MCG tablet Take 1 tablet (137 mcg) by mouth daily 90 tablet 3     lisinopril (PRINIVIL/ZESTRIL) 5 MG tablet TAKE ONE TABLET BY MOUTH EVERY DAY 90 tablet 3     magnesium oxide (MAG-OX) 400 (241.3 MG) MG tablet Take 1 tablet (400 mg) by mouth 2 times daily 120 tablet 11     metoprolol succinate (TOPROL-XL) 25 MG 24 hr tablet Take 1 tablet (25 mg) by mouth daily 90 tablet 3     mycophenolate (GENERIC EQUIVALENT) 250 MG capsule Take 1 capsule (250 mg) by mouth 2 times daily 60 capsule 11     predniSONE (DELTASONE) 5 MG tablet TAKE ONE TABLET BY MOUTH EVERY DAY 90 tablet 3     sulfamethoxazole-trimethoprim (BACTRIM/SEPTRA) 400-80 MG per tablet Take 1 tablet by mouth daily 30 tablet 11     tacrolimus (GENERIC EQUIVALENT) 0.5 MG capsule Take 1 capsule (0.5 mg) by mouth 2 times daily 1.5  mg twice per day. 60 capsule 5     tacrolimus (GENERIC EQUIVALENT) 1 MG capsule Take 1 capsule (1 mg) by mouth 2 times daily 1.5 mg twice per day. 60 capsule 5       PAST SURGICAL HISTORY:  Past Surgical History:   Procedure Laterality Date     ANESTHESIA CARDIOVERSION N/A 3/7/2018    Procedure: ANESTHESIA CARDIOVERSION;  Cardioversion;  Surgeon: GENERIC ANESTHESIA PROVIDER;  Location: [facility] OR     BENCH LIVER N/A 5/10/2016    Procedure: BENCH LIVER;  Surgeon: Ricky Deshpande MD;  Location: [facility] OR     BIOPSY LYMPH NODE CERVICAL Right 1/26/2017    Procedure: BIOPSY LYMPH NODE CERVICAL;  Surgeon: Beka Soto MD;  Location: [facility] OR     CARDIAC SURGERY  9-10-80     CREATE FISTULA ARTERIOVENOUS UPPER EXTREMITY Right 9/16/2014    Procedure: CREATE FISTULA ARTERIOVENOUS UPPER EXTREMITY;  Surgeon: Padmaja Eaton MD;  Location: [facility] OR     CYSTOSCOPY, REMOVE STENT(S), COMBINED Right 6/22/2016    Procedure: COMBINED CYSTOSCOPY, REMOVE STENT(S);  Surgeon: Ricky Deshpande MD;  Location: [facility] OR     ENT SURGERY       ESOPHAGOSCOPY, GASTROSCOPY, DUODENOSCOPY (EGD), COMBINED  5/30/2014    Procedure: COMBINED ESOPHAGOSCOPY, GASTROSCOPY, DUODENOSCOPY (EGD);  Surgeon: Guillaume Bautista MD;  Location:  GI     ESOPHAGOSCOPY, GASTROSCOPY, DUODENOSCOPY (EGD), COMBINED  9/30/14     ESOPHAGOSCOPY, GASTROSCOPY, DUODENOSCOPY (EGD), COMBINED Left 3/12/2015    Procedure: COMBINED ESOPHAGOSCOPY, GASTROSCOPY, DUODENOSCOPY (EGD);  Surgeon: Laureen Galvan MD;  Location:  GI     ESOPHAGOSCOPY, GASTROSCOPY, DUODENOSCOPY (EGD), COMBINED N/A 4/21/2016    Procedure: COMBINED ESOPHAGOSCOPY, GASTROSCOPY, DUODENOSCOPY (EGD);  Surgeon: Laureen Galvan MD;  Location:  GI     ESOPHAGOSCOPY, GASTROSCOPY, DUODENOSCOPY (EGD), COMBINED N/A 7/21/2016    Procedure: COMBINED ESOPHAGOSCOPY, GASTROSCOPY, DUODENOSCOPY (EGD);  Surgeon: Laureen Galvan MD;  Location:  GI     HC OR CATH ABLATION NON-CARDIAC ENDOVASCULAR       SVT     INSERT PORT VASCULAR ACCESS N/A 4/3/2017    Procedure: INSERT PORT VASCULAR ACCESS;  Surgeon: Rajendra Jacques PA-C;  [facility]: [facility] OR     LIGATE FISTULA ARTERIOVENOUS UPPER EXTREMITY Right 2017    Procedure: LIGATE FISTULA ARTERIOVENOUS UPPER EXTREMITY;  Revision of Right Arm Arteriovenous Fistula ;  Surgeon: Padmaja Eaton MD;  Location: [facility] OR     PICC INSERTION  14    PICC line placement 14; Removal 2014     THORACIC SURGERY  1980    Transposition great arteries, repaired at 8 months     THYROIDECTOMY Right 2017    Procedure: THYROIDECTOMY;  Bilateral Total Thyroidectomy ;  Surgeon: Beka Soto MD;  Location: [facility] OR     TRANSPLANT KIDNEY RECIPIENT  DONOR  5/10/2016    Procedure: TRANSPLANT KIDNEY RECIPIENT  DONOR;  Surgeon: Ricky Deshpande MD;  Location: [facility] OR     TRANSPLANT LIVER RECIPIENT  DONOR N/A 5/10/2016    Procedure: TRANSPLANT LIVER RECIPIENT  DONOR;  Surgeon: Ricky Deshpande MD;  Location: [facility] OR       ALLERGIES:     Allergies   Allergen Reactions     Hydromorphone Itching       FAMILY HISTORY:  Family History   Problem Relation Age of Onset     Hypertension Brother      Hypertension Sister      Arthritis Father      Hypertension Father      Hypertension Mother      Hypertension Sister      Alcohol/Drug No family hx of      GASTROINTESTINAL DISEASE No family hx of      no fam hx of liver disease or liver cancer       SOCIAL HISTORY:  Social History   Substance Use Topics     Smoking status: Former Smoker     Packs/day: 0.33     Years: 3.00     Types: Cigarettes     Start date: 1997     Quit date: 2000     Smokeless tobacco: Former User     Alcohol use No      Comment: ~10 drinks per day for ten years, quit in 2014       ROS:   A comprehensive 10 point review of systems negative other than as mentioned in HPI.  Exam:  /87 (BP Location: Left arm, Patient Position: Chair, Cuff Size: Adult  "Regular)  Pulse 70  Ht 1.778 m (5' 10\")  Wt 87.4 kg (192 lb 11.2 oz)  SpO2 97%  BMI 27.65 kg/m2  GENERAL APPEARANCE: alert and no distress  HEENT: no icterus, no xanthelasmas, normal pupil size and reaction, normal palate, mucosa moist, no central cyanosis  NECK: no adenopathy, no asymmetry, masses, or scars, thyroid normal to palpation and no bruits, JVP not elevated  RESPIRATORY: lungs clear to auscultation - no rales, rhonchi or wheezes, no use of accessory muscles, no retractions, respirations are unlabored, normal respiratory rate  CARDIOVASCULAR: regular rhythm, normal S1 with physiologic split S2, no S3 or S4 and no murmur, click or rub, precordium quiet with normal PMI.  ABDOMEN: soft, non tender, bowel sounds normal, non-distended  EXTREMITIES: peripheral pulses normal, no edema  NEURO: alert and oriented to person/place/time, normal speech, gait and affect  SKIN: no ecchymoses, no rashes  PSYCH: normal affect, cooperative    Labs:  Reviewed.     Testing/Procedures:  PULMONARY FUNCTION TESTS:   PFT Latest Ref Rng & Units 10/14/2014   FVC L 4.95   FEV1 L 4.38   FVC% % 92   FEV1% % 100         3/30/18 ECHOCARDIOGRAM:   Patient after atrial switch operation for complete transposition of the great  arteries. Technically difficult study due to poor acoustic windows. No baffle  obstruction or leaks seen. There is moderate right ventricular enlargement.  Single plane right ventricular EF 35-40 %. Normal left ventricular systolic  function. No outflow obstruction. Mild tricuspid regurgitation.    3/2017 CMRI (OSH REPORT):  1. Transposition of the great arteries with native atrioventricular   concordance and ventricular arterial discordance with the aorta anterior   to the pulmonary artery (D-transposition of the great arteries).  2. Status post interatrial baffle with the superior vena cava and inferior   vena cava baffled to the left (subpulmonic) ventricle without obstruction.    There is no evidence of a " baffle leak.    3. Systemic right ventricle:  a. The right ventricle is hypertrophied.  b. Decreased systolic function with an ejection fraction of 33%.  c. Severe dilation of the right ventricle with an end-diastolic volume of   208 mL/m  (previously 188 mL/m ) and end-systolic volume of 140 mL/m    (previous 123 mL/m ).  4. The right ventricle connects with the anterior aorta.  The coronary   artery origins are usual for transposition.  Left-sided aortic arch with   measurements above.  5. The pulmonary venous baffle to the right ventricle is widely patent.  6. The subpulmonary left ventricle has normal systolic function with   decreased myocardial mass.  The left ventricle connects to the pulmonary   artery, which is posterior to the aorta.  7. No significant change from previous cardiac MRI of 2012 except an   increased size in the indexed right ventricular diastolic and systolic   volumes.    Assessment and Plan:   Mr. Chen is a 37 year old male who has a past medical history significant for dTGA s/p modified Shoemaker operation and stitch closure of small VSD , AFL s/p DCCV 3/7/18 and s/p ablation 2000, liver and kidney transplant , post transplant lymphoproliferative disorder, papillary thyroid cancer s/p thyroidectomy, prior ETOH abuse (sober since ), anxiety, and depression. He presents today for follow up. He reports feeling well. No known recurrences of AFL. Most recent echo from 3/30/18 showed systemic EF 35-40%, mild TR, and no evidence of baffle leaks/obstructions. A CMRI from OSH from 3/2017 showed no baffle  Obstruction and systemic EF 33%.     Atrial Flutter:   We discussed in detail with the patient management/treatment options for AFL includin. Stroke Prophylaxis:  CHADSVASC=+HF, +HTN  2, corresponding to a 2.2% annual stroke / systemic emolism event rate. indicating need for long term oral anticoagulation. He also has congenital anatomy which we would recommend  anticoagulation. He is appropriately on Eliquis. No bleeding isues.    2. Rate Control: Continue Metoprolol XL 25 mg daily.   3. Rhythm Control: Cardioversion, Antiarrhythmics and/or ablation are options for rhythm control. He has previously been on Sotalol. Not currently on AATs. S/p DCCV 3/7/18  with history of Ablation at OSH in 2000. Organized A.flutter is highly amenable to ablation procedure. An ablation can be considered for any recurrence.   4. Risk Factor Management: maintain tight BP control, and JUAN evaluation.       dTGA systemic EF 35-40%, NYHA I-II:  1. ACEi/ARB: Continue Lisiopril.  2. BB: Continue Metoprolol XL 25 mg daily.   3. Aldosterone antagonist: not required.  4. SCD prophylaxis: not currently indicated.   5. Fluid status: euvolemic on exam    Follow up in about 1 year to be timed with Dr. Alba follow up.     The patient states understanding and is agreeable with plan.   This patient was seen and evaluated with Dr. George. The above note reflects our joint assessment and plan.   DARYN Rios CNP  Pager: [phone]    EP STAFF NOTE  I have seen and examined the patient as part of a shared visit with MADELYN Rios NP.  I agree with the note above. I reviewed today's vital signs and medications. I have reviewed and discussed with the advanced practice provider their physical examination, assessment, and plan   Briefly, no recurrences off sotalol  My key history/exam findings are: RRR.   The key management decisions made by me: ablation for recurrences.    Jaylen George MD [facility]  Cardiology - Electrophysiology

## 2018-07-24 NOTE — MR AVS SNAPSHOT
"              After Visit Summary   7/24/2018    Cricket Chen    MRN: [id number]           Patient Information     Date Of Birth          1980        Visit Information        Provider Department      7/24/2018 11:00 AM Jaylen George MD [facility]        Today's Diagnoses     Atrial flutter, unspecified type (H)          Care Instructions    You were seen today in the Adult Congenital and Cardiovascular Genetics Clinic at the [facility].    Cardiology Providers you saw during your visit:  Dr. Jaylen George    Diagnosis:  D-TGA    Results:  No testing today    Recommendations:    1.  Continue to eat a heart healthy, low salt diet.  2.  Continue to get 20-30 minutes of aerobic activity, 4-5 days per week.  Examples of aerobic activity include walking, running, swimming, cycling, etc.  3.  Continue to observe good oral hygiene, with regular dental visits.  4.  No changes today.       Vitals:    07/24/18 1048   BP: 132/87   BP Location: Left arm   Patient Position: Chair   Cuff Size: Adult Regular   Pulse: 70   SpO2: 97%   Weight: 87.4 kg (192 lb 11.2 oz)   Height: 1.778 m (5' 10\")       Exercise restrictions:   Yes__X__  No____         If yes, list restrictions:  Must be allowed to rest if fatigued or SOB      Work restrictions:  Yes____  No__X__         If yes, list restrictions:    FASTING CHOLESTEROL was checked in the last 5 years YES_X__  NO___   Continue to eat a heart healthy, low salt diet.         ____ Fasting lipid panel order today         ____ No changes in medications          ____ I recommend the following changes in your cholesterol medications.:          ____ Please follow up for cholesterol screening at your primary care physician      Follow-up:  Follow up with Dr. George in March 2019    If you have questions or concerns please contact us at:    Isabel Victor RN, BSN   Gilbert Odell (Scheduling)  Nurse Coordinator     Clinic   Adult Congenital " and CV Genetics Adult Congenital and CV Genetic  [facility]  (P)[phone]    (P) [phone]  [email or web address] (F)[phone]        For after hours urgent needs, call [phone] and ask to speak to the Adult Congenital Physician on call.  Mention Job Code 0401.    For emergencies call 911.    [facility]  [facility]  Mail Code [id number]  [address] [place]  [address]           Follow-ups after your visit        Your next 10 appointments already scheduled     Jul 25, 2018 10:00 AM CDT   (Arrive by 9:45 AM)   New Plastic Surgery with NANCI Gannon MD   [facility] ([facility])    [address] [place] [address]  4th Floor  [facility] [address]   [phone]           Do not wear perfume.            Aug 02, 2018  1:00 PM CDT   (Arrive by 12:45 PM)   New Patient Visit with Yuval Dobbins MD   [facility] Dermatology ([facility])    [address]  3rd Floor  [facility] [id number] [phone]            Aug 06, 2018  8:30 AM CDT   [facility] Lab Draw with  [facility] LAB DRAW   [facility] Lab Draw ([facility])    [address]  [address]  [facility] [id number] [phone]            Aug 06, 2018  9:00 AM CDT   (Arrive by 8:45 AM)   Return Visit with Madhav Moody MD   [facility] ([facility])    [address]  [address]  [facility] [id number] [phone]            Aug 06, 2018  2:20 PM CDT   (Arrive by 1:50 PM)   Return Kidney Transplant with  Kidney/Pancreas Recipient   [facility] Nephrology ([facility])    [address] [place] [address] [address]  [address]  [facility] [address]   [phone]            Aug 07, 2018 11:15 AM CDT   LAB with MARICARMEN  LAB   [facility]    [address] [address]   [phone]           Please do not eat 10-12 hours before your appointment if you are coming in fasting for labs on lipids, cholesterol, or glucose (sugar). This does not apply to pregnant women. Water, hot tea and black coffee (with nothing added) are okay. Do not drink other fluids, diet soda or chew gum.            Aug 10, 2018  1:50 PM CDT   (Arrive by 1:35 PM)   RETURN ENDOCRINE with Zuleyma Gray MD   [facility] Endocrinology ([facility])    [address]  3rd Floor  [facility] [id number] [phone]            Aug 21, 2018 11:15 AM CDT   LAB with FZ LAB   [facility] ([facility])    [address] [address]   [phone]           Please do not eat 10-12 hours before your appointment if you are coming in fasting for labs on lipids, cholesterol, or glucose (sugar). This does not apply to pregnant women. Water, hot tea and black coffee (with nothing added) are okay. Do not drink other fluids, diet soda or chew gum.            George 15, 2019  1:05 PM CST   (Arrive by 12:35 PM)   Return Kidney Transplant with  Kidney/Pancreas Recipient   [facility] Nephrology ([facility])    [address]  [address]  [facility] [id number] [phone]            Jan 18, 2019 11:00 AM CST   (Arrive by 10:45 AM)   Return Liver Transplant with Laureen Galvan MD   [facility] Hepatology ([facility])    [address]  [address]  [facility] [id number] [phone]              Who to contact     If you have questions or need follow up information about today's clinic visit or your schedule please contact [facility] directly at [phone].  Normal or non-critical lab and imaging results will be communicated to you by MyChart, letter or phone  "within 4 business days after the clinic has received the results. If you do not hear from us within 7 days, please contact the clinic through TASS or phone. If you have a critical or abnormal lab result, we will notify you by phone as soon as possible.  Submit refill requests through TASS or call your pharmacy and they will forward the refill request to us. Please allow 3 business days for your refill to be completed.          Additional Information About Your Visit        [employer] Information     TASS gives you secure access to your electronic health record. If you see a primary care provider, you can also send messages to your care team and make appointments. If you have questions, please call your primary care clinic.  If you do not have a primary care provider, please call [phone] and they will assist you.        Care EveryWhere ID     This is your Care EveryWhere ID. This could be used by other organizations to access your [place] medical records  [id number]        Your Vitals Were     Pulse Height Pulse Oximetry BMI (Body Mass Index)          70 1.778 m (5' 10\") 97% 27.65 kg/m2         Blood Pressure from Last 3 Encounters:   07/24/18 132/87   06/21/18 130/78   04/13/18 126/83    Weight from Last 3 Encounters:   07/24/18 87.4 kg (192 lb 11.2 oz)   06/21/18 88.4 kg (194 lb 14.4 oz)   04/13/18 91.8 kg (202 lb 6.1 oz)              We Performed the Following     EKG 12-lead, tracing only (Same Day)     Follow-Up with Cardiologist        Primary Care Provider    [facility]       No address on file        Equal Access to Services     CLAY VIDES : Hadii aad ku hadasho Soomaali, waaxda luqadaha, qaybta kaalmada robina mccann. So [facility] [phone].    ATENCIÓN: Si habla español, tiene a laura disposición servicios gratuitos de asistencia lingüística. Llame al [phone].    We comply with applicable federal civil rights laws and [place] " laws. We do not discriminate on the basis of race, color, national origin, age, disability, sex, sexual orientation, or gender identity.            Thank you!     Thank you for choosing [facility]  for your care. Our goal is always to provide you with excellent care. Hearing back from our patients is one way we can continue to improve our services. Please take a few minutes to complete the written survey that you may receive in the mail after your visit with us. Thank you!             Your Updated Medication List - Protect others around you: Learn how to safely use, store and throw away your medicines at www.disposemymeds.org.          This list is accurate as of 7/24/18 11:21 AM.  Always use your most recent med list.                   Brand Name Dispense Instructions for use Diagnosis    amLODIPine 10 MG tablet    NORVASC    90 tablet    TAKE ONE TABLET BY MOUTH EVERY DAY    HTN (hypertension)       apixaban ANTICOAGULANT 5 MG tablet    ELIQUIS    60 tablet    Take 1 tablet (5 mg) by mouth 2 times daily    Atrial flutter, unspecified type (H)       levothyroxine 137 MCG tablet    SYNTHROID/LEVOTHROID    90 tablet    Take 1 tablet (137 mcg) by mouth daily    Papillary thyroid carcinoma (H), Postoperative hypothyroidism       lisinopril 5 MG tablet    PRINIVIL/ZESTRIL    90 tablet    TAKE ONE TABLET BY MOUTH EVERY DAY    HTN (hypertension)       magnesium oxide 400 (241.3 Mg) MG tablet    MAG-OX    120 tablet    Take 1 tablet (400 mg) by mouth 2 times daily    Hypomagnesemia       metoprolol succinate 25 MG 24 hr tablet    TOPROL-XL    90 tablet    Take 1 tablet (25 mg) by mouth daily    Atrial flutter, unspecified type (H)       mycophenolate 250 MG capsule    GENERIC EQUIVALENT    60 capsule    Take 1 capsule (250 mg) by mouth 2 times daily    Kidney replaced by transplant       predniSONE 5 MG tablet    DELTASONE    90 tablet    TAKE ONE TABLET BY MOUTH EVERY DAY    Liver replaced by transplant (H), S/P  kidney transplant       sulfamethoxazole-trimethoprim 400-80 MG per tablet    BACTRIM/SEPTRA    30 tablet    Take 1 tablet by mouth daily    Liver replaced by transplant (H), S/P kidney transplant       * tacrolimus 1 MG capsule    GENERIC EQUIVALENT    60 capsule    Take 1 capsule (1 mg) by mouth 2 times daily 1.5 mg twice per day.    Liver replaced by transplant (H), S/P kidney transplant       * tacrolimus 0.5 MG capsule    GENERIC EQUIVALENT    60 capsule    Take 1 capsule (0.5 mg) by mouth 2 times daily 1.5 mg twice per day.    S/P kidney transplant       * Notice:  This list has 2 medication(s) that are the same as other medications prescribed for you. Read the directions carefully, and ask your doctor or other care provider to review them with you.

## 2018-07-24 NOTE — PROGRESS NOTES
[facility]  HPI:   Mr. Chen is a 37 year old male who has a past medical history significant for dTGA s/p modified Shoemaker operation and stitch closure of small VSD 1981, AFL s/p DCCV and s/p ablation 8/2000, liver and kidney transplant 2016, post transplant lymphoproliferative disorder, papillary thyroid cancer s/p thyroidectomy, prior ETOH abuse (sober since 2014), anxiety, and depression. He presents today for follow up.     He presented to [facility] [place] on 3/6/18 with shortness of breath and chest flutterings that started the day prior. He recalled it felt similar to when he was in AFL in the past. In the [facility] [place], he was found to be in AFL. He was subsequently admitted. He was started on Eliquis and arranged for NEELIMA/DCCV. He had successful DCCV 3/7/18 and he was discharged. He states that he had AFL in the past. He believes he had a DCCV around age 7. He had also been maintained on Sotalol. He also reports a previous ablation at an OSH in 8/2000 (records not available to us at this time). He did well without AFL issues until 3/2018 when he had recurrence. Most recent echo from 3/30/18 showed systemic EF 35-40%, mild TR, and no evidence of baffle leaks/obstructions. A CMRI from OSH from 3/2017 showed no baffle  Obstruction and systemic EF 33%. [facility]. He was doing well. He had not noted any further arrhythmias since his DCCV. He recently saw Dr. Alba who will have him do a CPX.     He presents today for follow up. He reports feeling well. No known recurrences of AFL. He denies any chest pain/pressures, dizziness, lightheadedness, worsening shortness of breath, leg/ankle swelling, PND, orthopnea, palpitations, or syncopal symptoms. Presenting 12 lead ECG shows NSR IRBBB Vent Rate 65 bpm,  ms,  ms, QTc 432 ms. Current cardiac medications include: Toprol XL, Eliquis, Lisinopril, and Norvasc.     PAST MEDICAL HISTORY:  Past Medical History:   Diagnosis Date      Alcohol abuse     Last drink in Mid-April 2014     Anemia in ESRD (end-stage renal disease) (H)      Anxiety 2008     Atrial flutter (H)      Cirrhosis (H)     S/P liver transplant     Depression      History of blood transfusion      History of transposition of great vessels     atrial switch at age 8 months old     Hypertension      Liver transplant recipient (H)     2016     Papillary thyroid carcinoma (H)      Pneumonia 11-15-14     Renal transplant recipient     2016     Varices, esophageal (H)        CURRENT MEDICATIONS:  Current Outpatient Prescriptions   Medication Sig Dispense Refill     amLODIPine (NORVASC) 10 MG tablet TAKE ONE TABLET BY MOUTH EVERY DAY 90 tablet 3     apixaban ANTICOAGULANT (ELIQUIS) 5 MG tablet Take 1 tablet (5 mg) by mouth 2 times daily 60 tablet 3     levothyroxine (SYNTHROID/LEVOTHROID) 137 MCG tablet Take 1 tablet (137 mcg) by mouth daily 90 tablet 3     lisinopril (PRINIVIL/ZESTRIL) 5 MG tablet TAKE ONE TABLET BY MOUTH EVERY DAY 90 tablet 3     magnesium oxide (MAG-OX) 400 (241.3 MG) MG tablet Take 1 tablet (400 mg) by mouth 2 times daily 120 tablet 11     metoprolol succinate (TOPROL-XL) 25 MG 24 hr tablet Take 1 tablet (25 mg) by mouth daily 90 tablet 3     mycophenolate (GENERIC EQUIVALENT) 250 MG capsule Take 1 capsule (250 mg) by mouth 2 times daily 60 capsule 11     predniSONE (DELTASONE) 5 MG tablet TAKE ONE TABLET BY MOUTH EVERY DAY 90 tablet 3     sulfamethoxazole-trimethoprim (BACTRIM/SEPTRA) 400-80 MG per tablet Take 1 tablet by mouth daily 30 tablet 11     tacrolimus (GENERIC EQUIVALENT) 0.5 MG capsule Take 1 capsule (0.5 mg) by mouth 2 times daily 1.5 mg twice per day. 60 capsule 5     tacrolimus (GENERIC EQUIVALENT) 1 MG capsule Take 1 capsule (1 mg) by mouth 2 times daily 1.5 mg twice per day. 60 capsule 5       PAST SURGICAL HISTORY:  Past Surgical History:   Procedure Laterality Date     ANESTHESIA CARDIOVERSION N/A 3/7/2018    Procedure: ANESTHESIA  CARDIOVERSION;  Cardioversion;  Surgeon: GENERIC ANESTHESIA PROVIDER;  Location: [facility] OR     BENCH LIVER N/A 5/10/2016    Procedure: BENCH LIVER;  Surgeon: Ricky Deshpande MD;  Location: [facility] OR     BIOPSY LYMPH NODE CERVICAL Right 1/26/2017    Procedure: BIOPSY LYMPH NODE CERVICAL;  Surgeon: Beka Soto MD;  Location: [facility] OR     CARDIAC SURGERY  [birth year]     CREATE FISTULA ARTERIOVENOUS UPPER EXTREMITY Right 9/16/2014    Procedure: CREATE FISTULA ARTERIOVENOUS UPPER EXTREMITY;  Surgeon: Padmaja Eaton MD;  Location: [facility] OR     CYSTOSCOPY, REMOVE STENT(S), COMBINED Right 6/22/2016    Procedure: COMBINED CYSTOSCOPY, REMOVE STENT(S);  Surgeon: Ricky Deshpande MD;  Location: [facility] OR     ENT SURGERY       ESOPHAGOSCOPY, GASTROSCOPY, DUODENOSCOPY (EGD), COMBINED  5/30/2014    Procedure: COMBINED ESOPHAGOSCOPY, GASTROSCOPY, DUODENOSCOPY (EGD);  Surgeon: Guillaume Bautista MD;  Location:  GI     ESOPHAGOSCOPY, GASTROSCOPY, DUODENOSCOPY (EGD), COMBINED  9/30/14     ESOPHAGOSCOPY, GASTROSCOPY, DUODENOSCOPY (EGD), COMBINED Left 3/12/2015    Procedure: COMBINED ESOPHAGOSCOPY, GASTROSCOPY, DUODENOSCOPY (EGD);  Surgeon: Laureen Galvan MD;  Location:  GI     ESOPHAGOSCOPY, GASTROSCOPY, DUODENOSCOPY (EGD), COMBINED N/A 4/21/2016    Procedure: COMBINED ESOPHAGOSCOPY, GASTROSCOPY, DUODENOSCOPY (EGD);  Surgeon: Laureen Galvan MD;  Location:  GI     ESOPHAGOSCOPY, GASTROSCOPY, DUODENOSCOPY (EGD), COMBINED N/A 7/21/2016    Procedure: COMBINED ESOPHAGOSCOPY, GASTROSCOPY, DUODENOSCOPY (EGD);  Surgeon: Laureen Galvan MD;  Location:  GI     [facility] OR CATH ABLATION NON-CARDIAC ENDOVASCULAR  1990,2002    SVT     INSERT PORT VASCULAR ACCESS N/A 4/3/2017    Procedure: INSERT PORT VASCULAR ACCESS;  Surgeon: Rajendra Jacques PA-C;  [facility] OR     LIGATE FISTULA ARTERIOVENOUS UPPER EXTREMITY Right 11/7/2017    Procedure: LIGATE FISTULA ARTERIOVENOUS UPPER EXTREMITY;  Revision of Right Arm  "Arteriovenous Fistula ;  Surgeon: Padmaja Eaton MD;  Location: [facility] OR     PICC INSERTION  14    PICC line placement 14; Removal 2014     THORACIC SURGERY  1980    Transposition great arteries, repaired at 8 months     THYROIDECTOMY Right 2017    Procedure: THYROIDECTOMY;  Bilateral Total Thyroidectomy ;  Surgeon: Beka Soto MD;  Location: [facility] OR     TRANSPLANT KIDNEY RECIPIENT  DONOR  5/10/2016    Procedure: TRANSPLANT KIDNEY RECIPIENT  DONOR;  Surgeon: Ricky Deshpande MD;  Location: [facility] OR     TRANSPLANT LIVER RECIPIENT  DONOR N/A 5/10/2016    Procedure: TRANSPLANT LIVER RECIPIENT  DONOR;  Surgeon: Ricky Deshpande MD;  Location: [facility] OR       ALLERGIES:     Allergies   Allergen Reactions     Hydromorphone Itching       FAMILY HISTORY:  Family History   Problem Relation Age of Onset     Hypertension Brother      Hypertension Sister      Arthritis Father      Hypertension Father      Hypertension Mother      Hypertension Sister      Alcohol/Drug No family hx of      GASTROINTESTINAL DISEASE No family hx of      no fam hx of liver disease or liver cancer       SOCIAL HISTORY:  Social History   Substance Use Topics     Smoking status: Former Smoker     Packs/day: 0.33     Years: 3.00     Types: Cigarettes     Start date: 1997     Quit date: 2000     Smokeless tobacco: Former User     Alcohol use No      Comment: ~10 drinks per day for ten years, quit in 2014       ROS:   A comprehensive 10 point review of systems negative other than as mentioned in HPI.  Exam:  /87 (BP Location: Left arm, Patient Position: Chair, Cuff Size: Adult Regular)  Pulse 70  Ht 1.778 m (5' 10\")  Wt 87.4 kg (192 lb 11.2 oz)  SpO2 97%  BMI 27.65 kg/m2  GENERAL APPEARANCE: alert and no distress  HEENT: no icterus, no xanthelasmas, normal pupil size and reaction, normal palate, mucosa moist, no central cyanosis  NECK: no adenopathy, no asymmetry, masses, or " scars, thyroid normal to palpation and no bruits, JVP not elevated  RESPIRATORY: lungs clear to auscultation - no rales, rhonchi or wheezes, no use of accessory muscles, no retractions, respirations are unlabored, normal respiratory rate  CARDIOVASCULAR: regular rhythm, normal S1 with physiologic split S2, no S3 or S4 and no murmur, click or rub, precordium quiet with normal PMI.  ABDOMEN: soft, non tender, bowel sounds normal, non-distended  EXTREMITIES: peripheral pulses normal, no edema  NEURO: alert and oriented to person/place/time, normal speech, gait and affect  SKIN: no ecchymoses, no rashes  PSYCH: normal affect, cooperative    Labs:  Reviewed.     Testing/Procedures:  PULMONARY FUNCTION TESTS:   PFT Latest Ref Rng & Units 10/14/2014   FVC L 4.95   FEV1 L 4.38   FVC% % 92   FEV1% % 100         3/30/18 ECHOCARDIOGRAM:   Patient after atrial switch operation for complete transposition of the great  arteries. Technically difficult study due to poor acoustic windows. No baffle  obstruction or leaks seen. There is moderate right ventricular enlargement.  Single plane right ventricular EF 35-40 %. Normal left ventricular systolic  function. No outflow obstruction. Mild tricuspid regurgitation.    3/2017 CMRI (OSH REPORT):  1. Transposition of the great arteries with native atrioventricular   concordance and ventricular arterial discordance with the aorta anterior   to the pulmonary artery (D-transposition of the great arteries).  2. Status post interatrial baffle with the superior vena cava and inferior   vena cava baffled to the left (subpulmonic) ventricle without obstruction.    There is no evidence of a baffle leak.    3. Systemic right ventricle:  a. The right ventricle is hypertrophied.  b. Decreased systolic function with an ejection fraction of 33%.  c. Severe dilation of the right ventricle with an end-diastolic volume of   208 mL/m  (previously 188 mL/m ) and end-systolic volume of 140 mL/m    (previous  123 mL/m ).  4. The right ventricle connects with the anterior aorta.  The coronary   artery origins are usual for transposition.  Left-sided aortic arch with   measurements above.  5. The pulmonary venous baffle to the right ventricle is widely patent.  6. The subpulmonary left ventricle has normal systolic function with   decreased myocardial mass.  The left ventricle connects to the pulmonary   artery, which is posterior to the aorta.  7. No significant change from previous cardiac MRI of 2012 except an   increased size in the indexed right ventricular diastolic and systolic   volumes.    Assessment and Plan:   Mr. Chen is a 37 year old male who has a past medical history significant for dTGA s/p modified Shoemaker operation and stitch closure of small VSD , AFL s/p DCCV 3/7/18 and s/p ablation 2000, liver and kidney transplant , post transplant lymphoproliferative disorder, papillary thyroid cancer s/p thyroidectomy, prior ETOH abuse (sober since ), anxiety, and depression. He presents today for follow up. He reports feeling well. No known recurrences of AFL. Most recent echo from 3/30/18 showed systemic EF 35-40%, mild TR, and no evidence of baffle leaks/obstructions. A CMRI from OSH from 3/2017 showed no baffle  Obstruction and systemic EF 33%.     Atrial Flutter:   We discussed in detail with the patient management/treatment options for AFL includin. Stroke Prophylaxis:  CHADSVASC=+HF, +HTN  2, corresponding to a 2.2% annual stroke / systemic emolism event rate. indicating need for long term oral anticoagulation. He also has congenital anatomy which we would recommend anticoagulation. He is appropriately on Eliquis. No bleeding isues.    2. Rate Control: Continue Metoprolol XL 25 mg daily.   3. Rhythm Control: Cardioversion, Antiarrhythmics and/or ablation are options for rhythm control. He has previously been on Sotalol. Not currently on AATs. S/p DCCV 3/7/18  with history of  Ablation at OSH in 2000. Organized A.flutter is highly amenable to ablation procedure. An ablation can be considered for any recurrence.   4. Risk Factor Management: maintain tight BP control, and JUAN evaluation.       dTGA systemic EF 35-40%, NYHA I-II:  1. ACEi/ARB: Continue Lisiopril.  2. BB: Continue Metoprolol XL 25 mg daily.   3. Aldosterone antagonist: not required.  4. SCD prophylaxis: not currently indicated.   5. Fluid status: euvolemic on exam    Follow up in about 1 year to be timed with Dr. Alba follow up.     The patient states understanding and is agreeable with plan.   This patient was seen and evaluated with Dr. George. The above note reflects our joint assessment and plan.   DARYN Rios CNP  Pager: [phone]    EP STAFF NOTE  I have seen and examined the patient as part of a shared visit with MADELYN Rios NP.  I agree with the note above. I reviewed today's vital signs and medications. I have reviewed and discussed with the advanced practice provider their physical examination, assessment, and plan   Briefly, no recurrences off sotalol  My key history/exam findings are: RRR.   The key management decisions made by me: ablation for recurrences.    Jaylen George MD [facility]  Cardiology - Electrophysiology

## 2018-07-25 ENCOUNTER — OFFICE VISIT (OUTPATIENT)
Dept: PLASTIC SURGERY | Facility: CLINIC | Age: 38
End: 2018-07-25
Payer: COMMERCIAL

## 2018-07-25 ENCOUNTER — RADIANT APPOINTMENT (OUTPATIENT)
Dept: ULTRASOUND IMAGING | Facility: CLINIC | Age: 38
End: 2018-07-25
Attending: PLASTIC SURGERY
Payer: COMMERCIAL

## 2018-07-25 ENCOUNTER — TELEPHONE (OUTPATIENT)
Dept: CARDIOLOGY | Facility: CLINIC | Age: 38
End: 2018-07-25

## 2018-07-25 DIAGNOSIS — Z94.4 LIVER REPLACED BY TRANSPLANT (H): ICD-10-CM

## 2018-07-25 DIAGNOSIS — Z94.0 S/P KIDNEY TRANSPLANT: ICD-10-CM

## 2018-07-25 DIAGNOSIS — R22.31 ARM MASS, RIGHT: Primary | ICD-10-CM

## 2018-07-25 DIAGNOSIS — I77.0 A-V FISTULA (H): ICD-10-CM

## 2018-07-25 DIAGNOSIS — Z94.0 KIDNEY TRANSPLANTED: ICD-10-CM

## 2018-07-25 DIAGNOSIS — D47.Z1 PTLD (POST-TRANSPLANT LYMPHOPROLIFERATIVE DISORDER) (H): ICD-10-CM

## 2018-07-25 DIAGNOSIS — R22.31 ARM MASS, RIGHT: ICD-10-CM

## 2018-07-25 LAB
ALBUMIN SERPL-MCNC: 4.2 G/DL (ref 3.4–5)
ALBUMIN UR-MCNC: NEGATIVE MG/DL
ALP SERPL-CCNC: 71 U/L (ref 40–150)
ALT SERPL W P-5'-P-CCNC: 22 U/L (ref 0–70)
ANION GAP SERPL CALCULATED.3IONS-SCNC: 7 MMOL/L (ref 3–14)
APPEARANCE UR: CLEAR
AST SERPL W P-5'-P-CCNC: 16 U/L (ref 0–45)
BASOPHILS # BLD AUTO: 0 10E9/L (ref 0–0.2)
BASOPHILS NFR BLD AUTO: 0.5 %
BILIRUB SERPL-MCNC: 0.5 MG/DL (ref 0.2–1.3)
BILIRUB UR QL STRIP: NEGATIVE
BKV DNA # SPEC NAA+PROBE: 9988 COPIES/ML
BKV DNA SPEC NAA+PROBE-LOG#: 4 LOG COPIES/ML
BUN SERPL-MCNC: 18 MG/DL (ref 7–30)
CALCIUM SERPL-MCNC: 9 MG/DL (ref 8.5–10.1)
CHLORIDE SERPL-SCNC: 104 MMOL/L (ref 94–109)
CO2 SERPL-SCNC: 25 MMOL/L (ref 20–32)
COLOR UR AUTO: ABNORMAL
CREAT SERPL-MCNC: 1.39 MG/DL (ref 0.66–1.25)
DIFFERENTIAL METHOD BLD: ABNORMAL
EOSINOPHIL # BLD AUTO: 0 10E9/L (ref 0–0.7)
EOSINOPHIL NFR BLD AUTO: 0.3 %
ERYTHROCYTE [DISTWIDTH] IN BLOOD BY AUTOMATED COUNT: 12.8 % (ref 10–15)
GFR SERPL CREATININE-BSD FRML MDRD: 57 ML/MIN/1.7M2
GLUCOSE SERPL-MCNC: 106 MG/DL (ref 70–99)
GLUCOSE UR STRIP-MCNC: NEGATIVE MG/DL
HCT VFR BLD AUTO: 45.2 % (ref 40–53)
HGB BLD-MCNC: 15 G/DL (ref 13.3–17.7)
HGB UR QL STRIP: NEGATIVE
IMM GRANULOCYTES # BLD: 0 10E9/L (ref 0–0.4)
IMM GRANULOCYTES NFR BLD: 0.2 %
INTERPRETATION ECG - MUSE: NORMAL
KETONES UR STRIP-MCNC: NEGATIVE MG/DL
LDH SERPL L TO P-CCNC: 166 U/L (ref 85–227)
LEUKOCYTE ESTERASE UR QL STRIP: NEGATIVE
LYMPHOCYTES # BLD AUTO: 0.5 10E9/L (ref 0.8–5.3)
LYMPHOCYTES NFR BLD AUTO: 7.6 %
MCH RBC QN AUTO: 30.4 PG (ref 26.5–33)
MCHC RBC AUTO-ENTMCNC: 33.2 G/DL (ref 31.5–36.5)
MCV RBC AUTO: 92 FL (ref 78–100)
MONOCYTES # BLD AUTO: 0.4 10E9/L (ref 0–1.3)
MONOCYTES NFR BLD AUTO: 6.1 %
MUCOUS THREADS #/AREA URNS LPF: PRESENT /LPF
NEUTROPHILS # BLD AUTO: 5.2 10E9/L (ref 1.6–8.3)
NEUTROPHILS NFR BLD AUTO: 85.3 %
NITRATE UR QL: NEGATIVE
NRBC # BLD AUTO: 0 10*3/UL
NRBC BLD AUTO-RTO: 0 /100
PH UR STRIP: 6 PH (ref 5–7)
PLATELET # BLD AUTO: 127 10E9/L (ref 150–450)
POTASSIUM SERPL-SCNC: 4.2 MMOL/L (ref 3.4–5.3)
PROT SERPL-MCNC: 7.3 G/DL (ref 6.8–8.8)
RBC # BLD AUTO: 4.94 10E12/L (ref 4.4–5.9)
RBC #/AREA URNS AUTO: 0 /HPF (ref 0–2)
SODIUM SERPL-SCNC: 136 MMOL/L (ref 133–144)
SOURCE: ABNORMAL
SP GR UR STRIP: 1 (ref 1–1.03)
SPECIMEN SOURCE: ABNORMAL
UROBILINOGEN UR STRIP-MCNC: 0 MG/DL (ref 0–2)
WBC # BLD AUTO: 6.1 10E9/L (ref 4–11)
WBC #/AREA URNS AUTO: 0 /HPF (ref 0–5)

## 2018-07-25 ASSESSMENT — PAIN SCALES - GENERAL: PAINLEVEL: NO PAIN (0)

## 2018-07-25 NOTE — TELEPHONE ENCOUNTER
Patient called wanting to know if he could hold Apixaban for one week in order to get accurate baseline lab results.

## 2018-07-25 NOTE — NURSING NOTE
Chief Complaint   Patient presents with     Consult     pt is here for ligatio of HD fistula       There were no vitals filed for this visit.    There is no height or weight on file to calculate BMI.      KELLY Bey, EMT                    No vitals taken per provider

## 2018-07-25 NOTE — LETTER
7/25/2018       RE: Cricket Chen  [address]  [facility] [address]     Dear Colleague,    Thank you for referring your patient, Cricket Chen, to the [facility] P[facility]ON[facility]GERY at [facility]. Please see a copy of my visit note below.    REFERRING PROVIDER:  Dr. Padmaja Eaton of Vascular Surgery.      PRESENTING COMPLAINT:  Consultation for a right antecubital fossa mass.       HISTORY OF PRESENT ILLNESS:  Mr. Chen is 37 years old.  He has a significant past history of hepatorenal syndrome/failure requiring a liver/kidney transplant.  Patient was on hemodialysis for 2 years and had a right forearm AV fistula.  Recently, about 8 months ago, the fistula was not required anymore and therefore through an incision in the antecubital fossa, the fistula was tied off.  Over the last few months, he has developed a mass in the right antecubital fossa area.  It is painful to the touch but stable in size.  No numbness/tingling in the distal part of the hand.  No workup has been done.  He has been sent to me for further recommendations.      PAST MEDICAL HISTORY:  Atrial fibrillation status post ablation, hypothyroidism, history of liver and kidney transplant, history of alcohol abuse, depression, hypertension.      PAST SURGICAL HISTORY:  Kidney transplant, liver transplant, transposition of great arteries requiring repair as a child, multiple scopes, thyroidectomy.      MEDICATIONS:     1.  Amlodipine.    2.  Eliquis.    3.  Levothyroxine.    4.  Lisinopril.    5.  Metoprolol.    6.  Mycophenolate.    7.  Prednisone.    8.  Bactrim.    9.  Tacrolimus.      ALLERGIES:  Hydromorphone.      He used to smoke about a pack a day for multiple years, quit in 2000.  Is an alcoholic but has been off of all alcohol since 2014.  Works in a 's education.  Lives in [place].        REVIEW OF SYSTEMS:  Denies chest pain, shortness of breath, MI, CVA, DVT and PE.       PHYSICAL EXAMINATION:   VITAL SIGNS:  Stable.  He is afebrile, in no obvious distress.  He is 5 feet 10 inches, 192 pounds, BMI of 27.7 kg/m2.    EXTREMITIES:  On examination of his right arm, he has an approximately 4 x 2 cm ovoid palpable firm mass in the antecubital fossa.  It is nontender.  There is no Tinel's.  He has a palpable brachial artery as well as a radial artery.  No swelling in the arm.  No evidence for vascular compromise.  It is warm, has hair growth.      ASSESSMENT AND PLAN:  Based on above findings, a diagnosis of palpable mass in the right antecubital fossa status post AV fistula tie-off 8 months ago was made.  I had a long discussion with the patient about the findings.  My concern is that could be a pseudoaneurysm and although there is no thrill or any pulsations in it, nonetheless, I think an ultrasound is warranted to not only look at the duplex of his vessels but also just to make sure and to see what this mass is.  We had that done today.  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  Given this finding, in my opinion, this needs to be dealt with by a vascular surgeon.  We have referred him back to Dr. Berry, as Dr. Eaton has subsequently a left our system.  I spoke to her nurse and he will be referred to her.  All questions were answered.  He was happy with the visit.  I will see him back on a p.r.n. basis.      Total time spent with patient 30 minutes, more than half was counseling.         Again, thank you for allowing me to participate in the care of your patient.      Sincerely,    NANCI Gannon MD

## 2018-07-25 NOTE — TELEPHONE ENCOUNTER
Spoke with pt regarding Dr Khan recommendation.    Pt verbalized he will have UA/UC done today.    Pt to call cardiologist to see if he is able to hold his eliquis. Pt will call us back to let us know either way. If able to hold eliquist, pt to hydrate well over this week and repeat creatinine level next week.     Pt questions answered, pt v/u.

## 2018-07-26 ENCOUNTER — CARE COORDINATION (OUTPATIENT)
Dept: CARDIOLOGY | Facility: CLINIC | Age: 38
End: 2018-07-26

## 2018-07-26 NOTE — PROGRESS NOTES
Patient called to see if he could stop his Eliquis x1 week to get a baseline creatinine level per his Kidney transplant coordinator. Per Emi Morris NP it is ok for patient to stop x1 week for this lab draw. However advised patient that Eliquis does not affect his creatnine level and he can discuss this with his transplant coordinator and decide how they would like to proceed. Advised to call back with additional questions.     Isabel Victor RN, BSN  Cardiology Care Coordinator  [facility]  [email or web address]  [phone]

## 2018-07-27 ENCOUNTER — TELEPHONE (OUTPATIENT)
Dept: DERMATOLOGY | Facility: CLINIC | Age: 38
End: 2018-07-27

## 2018-07-27 LAB
BKV DNA # SPEC NAA+PROBE: 5754 COPIES/ML
BKV DNA SPEC NAA+PROBE-LOG#: 3.8 LOG COPIES/ML
SPECIMEN SOURCE: ABNORMAL

## 2018-07-27 NOTE — TELEPHONE ENCOUNTER
Left message for Cricket reminding of appointment date and time. Asked that they bring an updated list of medications and any records pertaining to this visit.     Clinic number provided to call back in case this appointment needs to be canceled.

## 2018-07-30 DIAGNOSIS — Z94.4 LIVER REPLACED BY TRANSPLANT (H): Primary | ICD-10-CM

## 2018-07-30 DIAGNOSIS — I48.92 ATRIAL FLUTTER, UNSPECIFIED TYPE (H): ICD-10-CM

## 2018-08-01 ENCOUNTER — TELEPHONE (OUTPATIENT)
Dept: INTERNAL MEDICINE | Facility: CLINIC | Age: 38
End: 2018-08-01

## 2018-08-01 DIAGNOSIS — I48.92 ATRIAL FLUTTER, UNSPECIFIED TYPE (H): ICD-10-CM

## 2018-08-02 ENCOUNTER — OFFICE VISIT (OUTPATIENT)
Dept: VASCULAR SURGERY | Facility: CLINIC | Age: 38
End: 2018-08-02
Payer: COMMERCIAL

## 2018-08-02 ENCOUNTER — CARE COORDINATION (OUTPATIENT)
Dept: VASCULAR SURGERY | Facility: CLINIC | Age: 38
End: 2018-08-02

## 2018-08-02 ENCOUNTER — OFFICE VISIT (OUTPATIENT)
Dept: DERMATOLOGY | Facility: CLINIC | Age: 38
End: 2018-08-02
Payer: COMMERCIAL

## 2018-08-02 VITALS
DIASTOLIC BLOOD PRESSURE: 73 MMHG | OXYGEN SATURATION: 99 % | HEART RATE: 80 BPM | SYSTOLIC BLOOD PRESSURE: 126 MMHG | RESPIRATION RATE: 16 BRPM

## 2018-08-02 DIAGNOSIS — L21.9 DERMATITIS, SEBORRHEIC: Primary | ICD-10-CM

## 2018-08-02 DIAGNOSIS — I72.9 PSEUDOANEURYSM (H): Primary | ICD-10-CM

## 2018-08-02 RX ORDER — CEFAZOLIN SODIUM 1 G/50ML
1 INJECTION, SOLUTION INTRAVENOUS ONCE
Status: CANCELLED | OUTPATIENT
Start: 2018-08-02

## 2018-08-02 RX ORDER — KETOCONAZOLE 20 MG/ML
SHAMPOO TOPICAL
Qty: 120 ML | Refills: 3 | Status: SHIPPED | OUTPATIENT
Start: 2018-08-02 | End: 2018-10-18

## 2018-08-02 RX ORDER — KETOCONAZOLE 20 MG/G
CREAM TOPICAL
Qty: 60 G | Refills: 1 | Status: SHIPPED | OUTPATIENT
Start: 2018-08-02 | End: 2022-10-14

## 2018-08-02 ASSESSMENT — PAIN SCALES - GENERAL
PAINLEVEL: NO PAIN (0)
PAINLEVEL: NO PAIN (0)

## 2018-08-02 NOTE — MR AVS SNAPSHOT
After Visit Summary   8/2/2018    Cricket Chen    MRN: [id number]           Patient Information     Date Of Birth          1980        Visit Information        Provider Department      8/2/2018 4:00 PM John Payton MD [facility]        Today's Diagnoses     Pseudoaneurysm (H)    -  1       Follow-ups after your visit        Your next 10 appointments already scheduled     Aug 06, 2018  2:20 PM CDT   (Arrive by 1:50 PM)   Return Kidney Transplant with  Kidney/Pancreas Recipient   [facility] ([facility])    [address]  [address]  [facility] [id number] [phone]            Aug 07, 2018 11:15 AM CDT   LAB with FZ LAB   [facility] ([facility])    [address] [place], [place] [address] [address] [phone]           Please do not eat 10-12 hours before your appointment if you are coming in fasting for labs on lipids, cholesterol, or glucose (sugar). This does not apply to pregnant women. Water, hot tea and black coffee (with nothing added) are okay. Do not drink other fluids, diet soda or chew gum.            Aug 07, 2018 12:45 PM CDT   (Arrive by 12:30 PM)   Return Visit with Carter Prather MD   [facility] ([facility])    [address]  [address]  [facility] [id number] [phone]            Aug 10, 2018  1:50 PM CDT   (Arrive by 1:35 PM)   RETURN ENDOCRINE with Zuleyma Gray MD   [facility] ([facility])    [address]  3rd Floor  [facility] [id number] [phone]            Aug 17, 2018   Procedure with John Payton MD   [facility] [place] (--)    [address] [id number] [phone]            Aug 21, 2018 11:15 AM CDT   LAB with FZ LAB   [facility] ([facility])    [address] [place] [address]  [place]  [address]   [phone]           Please do not eat 10-12 hours before your appointment if you are coming in fasting for labs on lipids, cholesterol, or glucose (sugar). This does not apply to pregnant women. Water, hot tea and black coffee (with nothing added) are okay. Do not drink other fluids, diet soda or chew gum.            Oct 18, 2018 12:45 PM CDT   (Arrive by 12:30 PM)   Return Visit with Yuval Dobbins MD   [facility] ([facility])    [address] [place] [address]  3rd Floor  [facility] [id number] [phone]            George 15, 2019  1:05 PM CST   (Arrive by 12:35 PM)   Return Kidney Transplant with  Kidney/Pancreas Recipient   [facility] ([facility])    [address] [place] [address]  [address]  [facility] [id number] [phone]            Jan 18, 2019 11:00 AM CST   (Arrive by 10:45 AM)   Return Liver Transplant with Laureen Galvan MD   [facility] ([facility])    [address] [place] [address]  [address]  [facility] [id number] [phone]              Who to contact     Please call your clinic at [phone] to:    Ask questions about your health    Make or cancel appointments    Discuss your medicines    Learn about your test results    Speak to your doctor            Additional Information About Your Visit        Oscar[employer] Information     [employer] gives you secure access to your electronic health record. If you see a primary care provider, you can also send messages to your care team and make appointments. If you have questions, please call your primary care clinic.  If you do not have a primary care provider, please call [phone] and they will assist you.      [employer] is an electronic gateway that provides easy, online access to your medical records. With [employer], you can request a clinic appointment, read your test results, renew a prescription or communicate with your care team.     To  access your existing account, please contact your [facility] or call [phone] for assistance.        Care EveryWhere ID     This is your Care EveryWhere ID. This could be used by other organizations to access your [place] medical records  [id number]        Your Vitals Were     Pulse Respirations Pulse Oximetry             80 16 99%          Blood Pressure from Last 3 Encounters:   08/02/18 126/73   07/24/18 132/87   06/21/18 130/78    Weight from Last 3 Encounters:   07/24/18 192 lb 11.2 oz   06/21/18 194 lb 14.4 oz   04/13/18 202 lb 6.1 oz              We Performed the Following     Lianne-Operative Worksheet (Vascular)          Today's Medication Changes          These changes are accurate as of 8/2/18  5:07 PM.  If you have any questions, ask your nurse or doctor.               Start taking these medicines.        Dose/Directions    * ketoconazole 2 % cream   Commonly known as:  NIZORAL   Used for:  Dermatitis, seborrheic   Started by:  Yuval Dobbins MD        Twice daily to areas of rash on face   Quantity:  60 g   Refills:  1       * ketoconazole 2 % shampoo   Commonly known as:  NIZORAL   Used for:  Dermatitis, seborrheic   Started by:  Yuval Dobbins MD        Use as a foaming face wash in shower daily   Quantity:  120 mL   Refills:  3       * Notice:  This list has 2 medication(s) that are the same as other medications prescribed for you. Read the directions carefully, and ask your doctor or other care provider to review them with you.         Where to get your medicines      These medications were sent to [place], [place] - [address]  [address] [place] [address]    Hours:  TRANSPLANT PHONE NUMBER [phone] Phone:  [phone]     ketoconazole 2 % cream    ketoconazole 2 % shampoo                Primary Care Provider    [facility]       No address on file         Equal Access to Services     [facility] Center[facility]YETANO : Hadii steffany jurado jaydon Barroso, waraoulda luqadaha, qaybta karajwindercindi medinaeltonrobina puentesnowannika sibley. So [facility] [phone].    ATENCIÓN: Si jazzla ulises, tiene a laura disposición servicios gratuitos de asistencia lingüística. Ginaame al [phone].    We comply with applicable federal civil rights laws and [place] laws. We do not discriminate on the basis of race, color, national origin, age, disability, sex, sexual orientation, or gender identity.            Thank you!     Thank you for choosing [facility]  for your care. Our goal is always to provide you with excellent care. Hearing back from our patients is one way we can continue to improve our services. Please take a few minutes to complete the written survey that you may receive in the mail after your visit with us. Thank you!             Your Updated Medication List - Protect others around you: Learn how to safely use, store and throw away your medicines at www.disposemymeds.org.          This list is accurate as of 8/2/18  5:07 PM.  Always use your most recent med list.                   Brand Name Dispense Instructions for use Diagnosis    amLODIPine 10 MG tablet    NORVASC    90 tablet    TAKE ONE TABLET BY MOUTH EVERY DAY    HTN (hypertension)       apixaban ANTICOAGULANT 5 MG tablet    ELIQUIS    60 tablet    Take 1 tablet (5 mg) by mouth 2 times daily    Atrial flutter, unspecified type (H)       * ketoconazole 2 % cream    NIZORAL    60 g    Twice daily to areas of rash on face    Dermatitis, seborrheic       * ketoconazole 2 % shampoo    NIZORAL    120 mL    Use as a foaming face wash in shower daily    Dermatitis, seborrheic       levothyroxine 137 MCG tablet    SYNTHROID/LEVOTHROID    90 tablet    Take 1 tablet (137 mcg) by mouth daily    Papillary thyroid carcinoma (H), Postoperative hypothyroidism       lisinopril 5 MG tablet    PRINIVIL/ZESTRIL    90 tablet    TAKE ONE  TABLET BY MOUTH EVERY DAY    HTN (hypertension)       magnesium oxide 400 (241.3 Mg) MG tablet    MAG-OX    120 tablet    Take 1 tablet (400 mg) by mouth 2 times daily    Hypomagnesemia       metoprolol succinate 25 MG 24 hr tablet    TOPROL-XL    90 tablet    Take 1 tablet (25 mg) by mouth daily    Atrial flutter, unspecified type (H)       mycophenolate 250 MG capsule    GENERIC EQUIVALENT    60 capsule    Take 1 capsule (250 mg) by mouth 2 times daily    Kidney replaced by transplant       predniSONE 5 MG tablet    DELTASONE    90 tablet    TAKE ONE TABLET BY MOUTH EVERY DAY    Liver replaced by transplant (H), S/P kidney transplant       sulfamethoxazole-trimethoprim 400-80 MG per tablet    BACTRIM/SEPTRA    30 tablet    Take 1 tablet by mouth daily    Liver replaced by transplant (H), S/P kidney transplant       * tacrolimus 1 MG capsule    GENERIC EQUIVALENT    60 capsule    Take 1 capsule (1 mg) by mouth 2 times daily 1.5 mg twice per day.    Liver replaced by transplant (H), S/P kidney transplant       * tacrolimus 0.5 MG capsule    GENERIC EQUIVALENT    60 capsule    Take 1 capsule (0.5 mg) by mouth 2 times daily 1.5 mg twice per day.    S/P kidney transplant       * Notice:  This list has 4 medication(s) that are the same as other medications prescribed for you. Read the directions carefully, and ask your doctor or other care provider to review them with you.

## 2018-08-02 NOTE — PATIENT INSTRUCTIONS
Thank you for coming in today Cricket! For your facial rash we recommend using ketoconazole / Nizoral shampoo on the face and scalp daily, adding in a selenium-sulfide containing shampoo you can buy over the counter (e.g. SelSun Blue) if rash does not respond to treatment. Please check back with us in 2-3 months if rash persists with therapy. Otherwise please return in one year for an annual skin check.

## 2018-08-02 NOTE — PROGRESS NOTES
[facility] Dermatology Note      Dermatology Problem List:  1. Seborrheic dermatitis  Rx: Ketoconazole shampoo    Encounter Date: Aug 2, 2018    CC:  Chief Complaint   Patient presents with     Derm Problem     Check spot under right cheek, spot is always dry.     History of Present Illness:  Mr. 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     The rash has been present and relatively stable in severity / distribution for 6 months, appearing for the first time after finishing chemo (agents unknown, maybe Rituximab?, 4 sessions distributed across 4 months). Cricket describes his rash as dry, intermittently scaley and resistant to lotion (aquaphor, generics). He denies any new products and has never had a rash like this before. He also denies pruritus, pain and fluctuation with activity or sun exposure. He has no history of funny moles or skin cancer.     Past Medical History:   Patient Active Problem List   Diagnosis     Atrial flutter (H)     Complete transposition of great vessels     Esophageal varices (H)     Insomnia     Alcoholic hepatitis     History of alcohol abuse     History of transposition of great vessels     Depression     Thrombocytopenia (H)     Pain medication agreement     [facility]     Liver replaced by transplant (H)     Kidney replaced by transplant     Immunosuppressed status (H)     Hypomagnesemia     Secondary renal hyperparathyroidism (H)     Chronic pain  syndrome     Pain management contract signed     Aftercare following organ transplant     Post-transplant lymphoproliferative disorder (H)     Papillary thyroid carcinoma (H)     Advance directive discussed with patient     Acute alcoholic liver disease     Alcohol dependence (H)     Encounter for palliative care     Hypertension secondary to other renal disorders     Atrial fibrillation (H)     Postoperative hypothyroidism     Arm mass, right     Past Medical History:   Diagnosis Date     Alcohol abuse     Last drink in Mid-April 2014     Anemia in ESRD (end-stage renal disease) (H)      Anxiety 2008     Atrial flutter (H)      Cirrhosis (H)     S/P liver transplant     Depression      History of blood transfusion      History of transposition of great vessels     atrial switch at age 8 months old     Hypertension      Liver transplant recipient (H)     2016     Papillary thyroid carcinoma (H)      Pneumonia 11-15-14     Renal transplant recipient     2016     Varices, esophageal (H)      Past Surgical History:   Procedure Laterality Date     ANESTHESIA CARDIOVERSION N/A 3/7/2018    Procedure: ANESTHESIA CARDIOVERSION;  Cardioversion;  Surgeon: GENERIC ANESTHESIA PROVIDER;  Location: [facility] OR     BENCH LIVER N/A 5/10/2016    Procedure: BENCH LIVER;  Surgeon: Ricky Deshpande MD;  Location: [facility] OR     BIOPSY LYMPH NODE CERVICAL Right 1/26/2017    Procedure: BIOPSY LYMPH NODE CERVICAL;  Surgeon: Beka Soto MD;  Location: [facility] OR     CARDIAC SURGERY  9-10-80     CREATE FISTULA ARTERIOVENOUS UPPER EXTREMITY Right 9/16/2014    Procedure: CREATE FISTULA ARTERIOVENOUS UPPER EXTREMITY;  Surgeon: Padmaja Eaton MD;  Location: [facility] OR     CYSTOSCOPY, REMOVE STENT(S), COMBINED Right 6/22/2016    Procedure: COMBINED CYSTOSCOPY, REMOVE STENT(S);  Surgeon: Ricky Deshpande MD;  Location: [facility] OR     ENT SURGERY       ESOPHAGOSCOPY, GASTROSCOPY, DUODENOSCOPY (EGD), COMBINED  5/30/2014    Procedure: COMBINED ESOPHAGOSCOPY,  GASTROSCOPY, DUODENOSCOPY (EGD);  Surgeon: Guillaume Bautista MD;  Location:  GI     ESOPHAGOSCOPY, GASTROSCOPY, DUODENOSCOPY (EGD), COMBINED  14     ESOPHAGOSCOPY, GASTROSCOPY, DUODENOSCOPY (EGD), COMBINED Left 3/12/2015    Procedure: COMBINED ESOPHAGOSCOPY, GASTROSCOPY, DUODENOSCOPY (EGD);  Surgeon: Laureen Galvan MD;  Location:  GI     ESOPHAGOSCOPY, GASTROSCOPY, DUODENOSCOPY (EGD), COMBINED N/A 2016    Procedure: COMBINED ESOPHAGOSCOPY, GASTROSCOPY, DUODENOSCOPY (EGD);  Surgeon: Laureen Galvan MD;  Location:  GI     ESOPHAGOSCOPY, GASTROSCOPY, DUODENOSCOPY (EGD), COMBINED N/A 2016    Procedure: COMBINED ESOPHAGOSCOPY, GASTROSCOPY, DUODENOSCOPY (EGD);  Surgeon: Laureen Galvan MD;  Location:  GI     HC OR CATH ABLATION NON-CARDIAC ENDOVASCULAR      SVT     INSERT PORT VASCULAR ACCESS N/A 4/3/2017    Procedure: INSERT PORT VASCULAR ACCESS;  Surgeon: Rajendra Jacques PA-C;  [facility]: [facility] OR     LIGATE FISTULA ARTERIOVENOUS UPPER EXTREMITY Right 2017    Procedure: LIGATE FISTULA ARTERIOVENOUS UPPER EXTREMITY;  Revision of Right Arm Arteriovenous Fistula ;  Surgeon: Padmaja Eaton MD;  Location: [facility] OR     PICC INSERTION  14    PICC line placement 14; Removal 2014     THORACIC SURGERY  1980    Transposition great arteries, repaired at 8 months     THYROIDECTOMY Right 2017    Procedure: THYROIDECTOMY;  Bilateral Total Thyroidectomy ;  Surgeon: Beka Soto MD;  Location:  OR     TRANSPLANT KIDNEY RECIPIENT  DONOR  5/10/2016    Procedure: TRANSPLANT KIDNEY RECIPIENT  DONOR;  Surgeon: Ricky Deshpande MD;  Location:  OR     TRANSPLANT LIVER RECIPIENT  DONOR N/A 5/10/2016    Procedure: TRANSPLANT LIVER RECIPIENT  DONOR;  Surgeon: Ricky Deshpande MD;  Location:  OR       Social History:  The patient works in [employer]. He doesn't use sunscreen but has very limited sun  exposure and wears baseball caps. He is a distant smoker ('99) and former alcoholic.     Family History:  There is no family history of skin cancer or atypical moles.     Medications:  Current Outpatient Prescriptions   Medication Sig Dispense Refill     amLODIPine (NORVASC) 10 MG tablet TAKE ONE TABLET BY MOUTH EVERY DAY 90 tablet 3     apixaban ANTICOAGULANT (ELIQUIS) 5 MG tablet Take 1 tablet (5 mg) by mouth 2 times daily 60 tablet 3     levothyroxine (SYNTHROID/LEVOTHROID) 137 MCG tablet Take 1 tablet (137 mcg) by mouth daily 90 tablet 3     lisinopril (PRINIVIL/ZESTRIL) 5 MG tablet TAKE ONE TABLET BY MOUTH EVERY DAY 90 tablet 3     magnesium oxide (MAG-OX) 400 (241.3 MG) MG tablet Take 1 tablet (400 mg) by mouth 2 times daily 120 tablet 11     metoprolol succinate (TOPROL-XL) 25 MG 24 hr tablet Take 1 tablet (25 mg) by mouth daily 90 tablet 3     mycophenolate (GENERIC EQUIVALENT) 250 MG capsule Take 1 capsule (250 mg) by mouth 2 times daily 60 capsule 11     predniSONE (DELTASONE) 5 MG tablet TAKE ONE TABLET BY MOUTH EVERY DAY 90 tablet 3     sulfamethoxazole-trimethoprim (BACTRIM/SEPTRA) 400-80 MG per tablet Take 1 tablet by mouth daily 30 tablet 11     tacrolimus (GENERIC EQUIVALENT) 0.5 MG capsule Take 1 capsule (0.5 mg) by mouth 2 times daily 1.5 mg twice per day. 60 capsule 5     tacrolimus (GENERIC EQUIVALENT) 1 MG capsule Take 1 capsule (1 mg) by mouth 2 times daily 1.5 mg twice per day. 60 capsule 5     Allergies   Allergen Reactions     Hydromorphone Itching     Review of Systems:  -As per HPI  -Constitutional: The patient denies fatigue, fevers, chills, unintended weight loss, and night sweats.  -Skin: As above in HPI. No additional skin concerns.    Physical exam:  Vitals: There were no vitals taken for this visit.  GEN: This is a well developed, well-nourished male in no acute distress, in a pleasant mood.    SKIN: Total skin excluding the undergarment areas was performed. The exam included the  head/face, neck, both arms, chest, back, abdomen, both legs, digits and/or nails.   - pigmented nevus with well-delineated borders and homogenous network on dermoscopy visible on posterior aspect of left calf  - tattoos present on chest, back and upper arms bilaterally  - faintly erythematous, scaling, ill-defined plaque superior to the R mesolabial fold  - patchy vascular stain anterior to R auricle    - well-healed linear scars from liver and kidney transplants  - AV fistula visualized on the inner aspect of the right arm  - No other lesions of concern on areas examined.     Impression/Plan:  1. Seborrheic dermatitis    Benign and chronic nature of condition was discussed with the patient     Start MARIKA/Nizoral shampoo on face and scalp daily, augmenting with SelSun blue or selenium-sulfide equivalent if recalcitrant to MARIKA    May consider protopic as an adjuvant treatment in the future     Follow-up in 2-3 months if rash persists despite therapy.      Staff Involved:  Scribed by Felisha Trotter, MS4 for Dr. Dobbins.      The medical student acted as a scribe with respect to this patient.  I have performed all the key elements of the history and physical.    Yuval Dobbins MD  Dermatology Attending

## 2018-08-02 NOTE — LETTER
Cricket Chen  [address]  [place] [address]      SURGERY PACKET            Your surgery is scheduled:    Date: 8/14/18  ________________________________    Time: 12:40  ________________________________    Please arrive at:  10:40 am  ______________________    Surgeon's Name: Dr LIEN Payton  _______________________        Pre-Op Physical Fax Numbers:          [facility] Pre-Admissions  Fax: [phone]  Phone: [phone]        Your surgery is located at:      [facility], [address] [address]      [phone]      [email or web address]       Before Your Surgery  For Patients and Visitors at [facility]    Welcome  As you get ready for surgery, you may have a lot of questions.  This brochure will help you know what to expect before and after surgery.  You and your family are the most important members of your health care team.  You will need to take an active role in your care.    Be sure to ask questions and learn all that you can about your surgery.  If you have any safety concerns, we urge you to tell a nurse as soon as possible.   This brochure is for information only.  It does not replace the advice of your doctor.  Always follow your doctor's advice.    Please tell us if you need a .    GETTING READY FOR SURGERY  Always follow your surgeon's instructions.  If you don't, your surgery could be canceled.  Please use the following checklist.    Within 30 Days of Surgery:    Have a pre-surgery physical exam with your family doctor or partner.    If you use a [facility], all of your information from the pre-op physical will be in the [facility] computer system.    Ask the doctor to send all of your results to the hospital before the surgery.  The doctor also may ask you to bring the results with you on the day of surgery or you can fax them to [phone].  Tell the doctor  if:    You are allergic to latex or rubber  (Latex and rubber gloves are often used in medical care).    You are taking any medicines (including aspirin), vitamins (Vitamin E, Fish Oil, Omegas) or herbal products.  You will need to stop taking some medicines before surgery.    You have any medical problems (allergies, diabetes or heart disease, for example).    You have a pacemaker or an AICD (automatic implanted cardiac defibrillator).  If you do, please bring the ID card with you on the day of surgery.    You are a smoker.  People who smoke have a higher risk of infection after surgery.  Ask your doctor how you can quit smoking.  Within 7 days of Surgery:    Pre-register with the hospital.  Please use the hospital's phone number listed on the first page of this brochure.  Or, to register online, go to [email or web address].      Prior to your surgical procedure, a nurse will be contacting you to obtain a health history ([phone]).  Additionally, someone from the Admissions Department will also contact you for preregistration ([phone]).      Call your insurance company.  Ask if you need pre-approval for your surgery.  If you do not have insurance, please let us know.      Arrange for someone to drive you home after surgery.  If you will have same-day surgery, you may not drive or take public transportation home by yourself.    Arrange for someone to stay with you for 24 hours after you go home.  This person must be a responsible adult (ie- Family member or friend).  The Day Before Surgery:     Call your surgeon if there are any changes in your health.  This includes signs of a cold or flu (such as a sore throat, runny nose, cough, rash or fever).    Do not smoke, drink alcohol or take over-the-counter medicine (unless your surgeon tells you to) for 24 hours before and after surgery.    If you take prescribed drugs:  You may need to stop them until after the surgery.  Follow your doctor's orders.  You may  resume Aspirin and/or blood thinners after your surgery as directed by your physician/surgeon.    NO FOOD OR DRINK AFTER MIDNIGHT.  Follow your surgeon's orders for eating and drinking.  You need to have an empty stomach before surgery.  This will make the surgery as safe as possible.  If you don't follow your doctor's orders, your surgery could be changed to another date.  A nurse will call you within a few days of surgery to go over these and other instructions.  If you do not hear from them, please call them at [phone]  The Day of Surgery:    Take a shower or bath the night before and the morning of surgery.  Use antiseptic soap or the soap your surgeon gave you.  Gently clean the skin.  Do not shave or scrub your surgery site.    Please remove deodorant, cologne, scented lotion, makeup, nail polish and jewelry (including rings and body piercings).  If you wear artificial nails, please remove at least one nail before coming to the hospital.    Wear clean, loose clothing to the hospital.    Bring these items to the hospital:  1. Your insurance card.  2. Money for parking and co-pays (for medicines or the surgery), if needed.   3. A list of all the medicines you take.  Include vitamins, minerals, herbs and over-the-counter drugs.  Note any drug allergies.  4. A copy of your advance health care directive, if you have one.  This tells us what treatment you would want -- and who would make health care decisions -- if you could no longer speak for yourself.  You may request this form in advance or download it from [email or web address].  5. A case for any glasses, contact lenses, hearing aids or dentures.  6. Your inhaler or CPAP machine, if you use these at home.  Leave extra cash, jewelry and other valuables at home.    When You Arrive:  When you get to the hospital, you will:    Check in.  If you are under age 18, you must be with a parent or legal guardian.    Sign consent forms, if you haven't already.   These forms state that you know the risks and benefits of surgery.  When you sign the forms, you give us permission to do the surgery.  Do not sign them unless you understand what will happen during and after your surgery.  If you have any questions about your surgery, ask to speak with your doctor before you sign the forms.  If you don't understand the answers, ask again.    Receive a copy of the Patients Bill of Rights.  If you do not receive a copy, please ask for one.    Change into hospital clothes.  Your belongings will be placed in a bag.  We will return them to you after surgery.    Meet with the anesthesia provider.  He or she will tell you what kind of anesthesia (medicine) will be used to keep you comfortable during surgery.  Remember: It's okay to remind doctors and nurses to wash their hands before touching you.   In most cases, your surgeon will use a marker to write his or her initials on the surgery site.  This ensures that the exact site is operated on.  For safety reasons, we will ask you the same questions many times.  For example, we may ask your name and birth date over and over again.  Friends and family can stay with you until it's time for surgery.  While you're in surgery, they will be in the waiting area.  Please note that cell phones are not allowed in some patient care areas.  If you have questions about what will happen in the operating room, talk to your care team.  After Surgery:    We will move you to a recovery room where we will watch you closely.  If you have any pain or discomfort, tell your nurse.  He or she will try to make you comfortable.      If you are staying overnight we will move you to your hospital room after you are awake.    If you are going home we will move you to another room.  Friends and family may be able to join you.  The length of time you spend in recovery depends on the type of medicine you received, your medical condition, and the type of surgery you  "had.  Dealing with pain:  A nurse will check your comfort level often during your stay.  He or she will work with you to manage your pain.  Remember:    All pain is real.  There are many ways to control pain.  We can help you decide what works best for you.    Ask for pain medicine when you need it.  Don't try to \"tough it out\" -- this can make you feel worse.  Always take your medicine as ordered.    Medicine doesn't work the same for everyone.  If your medicine isn't working tell your nurse.  There may be other medicines or treatments we can try.  Going Home:  We will let you know when you're ready to leave the hospital.  Before you leave, we will tell you how to care for yourself at home and prevent infections.  If you do not understand something, please say so.  We will answer any questions you have.  We will then help you get ready to leave.  You must have an adult with you for the first 24 hours after you leave the hospital. Take it easy when you get home.  You will need some time to recover -- you may be more tired than you realize at first.  Rest and relax for at least the first 24 hours at home.  You'll feel better and heal faster if you take good care of yourself.                      Pre-Operative Surgery Scrub    Purpose:   The skin harbors a large variety of bacteria, both infectious and noninfectious.  Showering with an antiseptic soap prior to an invasive procedure will decrease the number of transient and resident (good and bad) bacteria that is normally found on the skin.    Procedure:      Shower or bathe with 1/2 of the bottle of antiseptic soap (enclosed) the evening before and 1/2 of the bottle the morning of surgery (bathing the day of the procedure is most important).       Apply the soap at full strength (use the entire bottle).  Gently clean the skin, rinse, and dry with a clean towel that is freshly laundered (out of the dryer) and then put on clean clothing that is freshly laundered.  "       We have given you information regarding pre-op showering.  We recommend that patients wash with an antiseptic soap prior to surgery.  This cleanser will be given to you at the clinic or mailed to your home.  It is advised that you liberally wash the specific area surgery area the night before, and again in the morning before the surgery.  Do not apply lotion afterward.  We would like to keep the skin as clean as possible.    Thank you for following these important instructions.      You have been scheduled for surgery and we would like to give you some information that will assist in helping get the best possible outcome.      Before Surgery:   If for any reason you decide not to have the surgery, please contact our office.  We can easily cancel or reschedule the procedure. Please call the  at [phone].      Any pain related to the surgery that occurs before the surgery needs to be reported and managed by your primary care or referring doctor.      Please keep in mind that the time of surgery is subject to change.  Make sure you have nothing to eat or drink after midnight.  If your surgery is later in the afternoon, this recommendation might change, but not until the day before surgery after the actual time of the surgery has been established.    After Surgery:  When you are discharged from the recovery room, the nurses will review instructions with you and your caregiver.      Please wash your hands every time you touch the wound or change bandages or dressings.      Do not submerge the wound in water.  You may not use a bathtub or hot tub until the wound is closed.  The wait time frame is generally 2-3 weeks but any open area can be a source of incoming bacteria, so it is better to be on the safe side and avoid the tub until your wound is fully healed.      You may take a shower 24 hours after surgery.  Double check with your surgeon if it is ok for water to run over a wound, whether it has been  sutured, stapled, glued or is open.  You may gently wash the wound using the antiseptic soap provided for your pre-surgery showering (do not use a washcloth).  Any mild soap will work as well.      Many surgical wounds will have small white strips of tape on them called Steri Strips.  Do not remove these.  The edges will curl and fall off within 7-10 days with normal showering.      If you are going home with sutures (stitches) or staples, you must return to the clinic to have them taken out, usually within 1-2 weeks.      Signs and symptoms of infection include:  1. Fever, temperature over 101.5 ' F  2. Redness  3. Swelling  4. Increasing pain  5. Green or yellow drainage which may or may not have a foul odor.    Symptoms of infection need to be reported to your surgery office. Please call the nurse at [phone].    If you or your  are deaf or hard of hearing, or prefer a language other than English, please let us know.  We have many free services, including interpreters and other aids to help you communicate. You may ask for help  through any member of your care team or by calling Language Services at [phone], option 2.

## 2018-08-02 NOTE — PROGRESS NOTES
VASCULAR SURGERY CLINIC NOTE    HPI:    Pt is a 37 year old year with a past medical history significant for hepatorenal syndrome secondary to alcoholic cirrhosis s/p liver/kidney transplant. He had a RUE brachiocephalic fistula ligated.    Subjective:   He continues to have a lump in his R antecubital fossa. This was worked up by a plastic surgeon with an ultrasound that showed arterial flow suggesting a psuedoaneurysm. He denies any pain or increase in size. His R hand has normal strength. He has no other complaints.    Answers for HPI/ROS submitted by the patient on 7/28/2018   General Symptoms: No  Skin Symptoms: No  HENT Symptoms: No  EYE SYMPTOMS: No  HEART SYMPTOMS: No  LUNG SYMPTOMS: No  INTESTINAL SYMPTOMS: No  URINARY SYMPTOMS: No  REPRODUCTIVE SYMPTOMS: No  SKELETAL SYMPTOMS: No  BLOOD SYMPTOMS: No  NERVOUS SYSTEM SYMPTOMS: No  MENTAL HEALTH SYMPTOMS: No      Patient Active Problem List   Diagnosis     Atrial flutter (H)     Complete transposition of great vessels     Esophageal varices (H)     Insomnia     Alcoholic hepatitis     History of alcohol abuse     History of transposition of great vessels     Depression     Thrombocytopenia (H)     Pain medication agreement     [facility]     Liver replaced by transplant (H)     Kidney replaced by transplant     Immunosuppressed status (H)     Hypomagnesemia     Secondary renal hyperparathyroidism (H)     Chronic pain syndrome     Pain management contract signed     Aftercare following organ transplant     Post-transplant lymphoproliferative disorder (H)     Papillary thyroid carcinoma (H)     Advance directive discussed with patient     Acute alcoholic liver disease     Alcohol dependence (H)     Encounter for palliative care     Hypertension secondary to other renal disorders     Atrial fibrillation (H)     Postoperative hypothyroidism     Arm mass, right       Objective:  Vital Signs:  BP  126/73 (BP Location: Left arm)  Pulse 80  Resp 16  SpO2 99%    Prior to Admission medications    Medication Sig Start Date End Date Taking? Authorizing Provider   amLODIPine (NORVASC) 10 MG tablet TAKE ONE TABLET BY MOUTH EVERY DAY 9/5/17   Jake Sidhu MD   apixaban ANTICOAGULANT (ELIQUIS) 5 MG tablet Take 1 tablet (5 mg) by mouth 2 times daily 3/30/18   Francesca Alba MD   ketoconazole (NIZORAL) 2 % cream Twice daily to areas of rash on face 8/2/18   Yuval Dobbins MD   ketoconazole (NIZORAL) 2 % shampoo Use as a foaming face wash in shower daily 8/2/18   Yuval Dobbins MD   levothyroxine (SYNTHROID/LEVOTHROID) 137 MCG tablet Take 1 tablet (137 mcg) by mouth daily 4/4/18   Zuleyma Gray MD   lisinopril (PRINIVIL/ZESTRIL) 5 MG tablet TAKE ONE TABLET BY MOUTH EVERY DAY 9/5/17   Jake Sidhu MD   magnesium oxide (MAG-OX) 400 (241.3 MG) MG tablet Take 1 tablet (400 mg) by mouth 2 times daily 7/21/17   Laureen Galvan MD   metoprolol succinate (TOPROL-XL) 25 MG 24 hr tablet Take 1 tablet (25 mg) by mouth daily 4/13/18   Jaylen George MD   mycophenolate (GENERIC EQUIVALENT) 250 MG capsule Take 1 capsule (250 mg) by mouth 2 times daily 12/18/17   Jake Sidhu MD   predniSONE (DELTASONE) 5 MG tablet TAKE ONE TABLET BY MOUTH EVERY DAY 7/16/18   Jake Sidhu MD   sulfamethoxazole-trimethoprim (BACTRIM/SEPTRA) 400-80 MG per tablet Take 1 tablet by mouth daily 3/1/18   Laureen Galvan MD   tacrolimus (GENERIC EQUIVALENT) 0.5 MG capsule Take 1 capsule (0.5 mg) by mouth 2 times daily 1.5 mg twice per day. 2/12/18   Jake Sidhu MD   tacrolimus (GENERIC EQUIVALENT) 1 MG capsule Take 1 capsule (1 mg) by mouth 2 times daily 1.5 mg twice per day. 2/12/18   Jake Sidhu MD       PHYSICAL EXAM:  General: The patient is alert and oriented.  Moves all extremities.   HEENT: Color appropriate for race, warm, dry  Heart::  RRR  Lungs: Breathing unlabored    GI: Bowel sounds present.  Abdomen soft, nontender to light palpation.  EXTREMITIES: Skin over the extremities warm & pink. No edema noted.  RUE antecubital fossa with 6 cm x 3 cm mass without pulsatility  PULSES: B/L radial/ulnar pulses palpable  Neurologic: Grossly intact, EOMs grossly intact    Imaging:    EXAMINATION: Ultrasound extremity arterial, venous dialysis access  graft, 7/25/2018 6:04 PM      COMPARISON: 7/8/2016     HISTORY: Arm mass, AV fistula     FINDINGS: There is a 3.8 x 3.3 x 7.0 cm echogenic, heterogeneous  well-circumscribed rounded area in the area of the palpable lump over  the right AC fossa. There is a vessel feeding the inferior aspect of  the lesion with arterial waveforms.     The right brachial artery is patent above and below the antecubital  fossa with normal triphasic waveforms.            IMPRESSION:  Likely 7 cm pseudoaneurysm in the right AC fossa, with the majority  containing old, clotted blood products, though there continues to be a  feeding artery.         Assessment:  37M with R brachial artery pseudoaneurysm.    Plan:  Pseudoaneurysm will have to be repaired operatively possibly with a venous patch. The procedure has been discussed with the patient and he would like to proceed. We will hold his Eliquis 2 days prior.         Sobeida Norwood MD  Vascular Surgery Fellow  [facility]         Attestation  I have seen and examined this patient with , and I agree with her findings and assessment. Briefly, this 38 YO WM liver and renal transplant recipient has a large mass in the right antecubital space near the proximal anastomosis of an aneurysmal AV fistula that was ligated. Ultrasound demonstrates flow in the mass, c/w a pseudoaneurysm. We plan to resect the large mass under general anesthesia and to repair or replace the affected artery with saphenous vein.

## 2018-08-02 NOTE — NURSING NOTE
Chief Complaint   Patient presents with     RECHECK     Follow up on right arm fistula US       Vitals:    08/02/18 1600   BP: 126/73   BP Location: Left arm   Pulse: 80   Resp: 16   SpO2: 99%       There is no height or weight on file to calculate BMI.            Kaylan Corado LPN

## 2018-08-02 NOTE — PROGRESS NOTES
April referred back to Vascular Surgery by Dr Gannon.  Cricket had AV fistula tied off approximately 8 months ago.  Now with psuedoaneurysm being fed by brachial  Artery.

## 2018-08-02 NOTE — MR AVS SNAPSHOT
After Visit Summary   8/2/2018    Cricket Chen    MRN: [id number]           Patient Information     Date Of Birth          1980        Visit Information        Provider Department      8/2/2018 1:00 PM Yuval Dobbins MD [facility] Dermatology        Today's Diagnoses     Dermatitis, seborrheic    -  1      Care Instructions    Thank you for coming in today Cricket! For your facial rash we recommend using ketoconazole / Nizoral shampoo on the face and scalp daily, adding in a selenium-sulfide containing shampoo you can buy over the counter (e.g. SelSun Blue) if rash does not respond to treatment. Please check back with us in 2-3 months if rash persists with therapy. Otherwise please return in one year for an annual skin check.             Follow-ups after your visit        Your next 10 appointments already scheduled     Aug 02, 2018  4:00 PM CDT   (Arrive by 3:45 PM)   New Vascular Visit with John Payton MD   [facility] ([facility])    [address]  3rd [address]  [facility] [address] [phone]            Aug 06, 2018  2:20 PM CDT   (Arrive by 1:50 PM)   Return Kidney Transplant with  Kidney/Pancreas Recipient   [facility] Nephrology ([facility])    [address]  [address]  [facility] [address] [phone]            Aug 07, 2018 11:15 AM CDT   LAB with  LAB   [facility]    [address] [place] [id number] [phone]           Please do not eat 10-12 hours before your appointment if you are coming in fasting for labs on lipids, cholesterol, or glucose (sugar). This does not apply to pregnant women. Water, hot tea and black coffee (with nothing added) are okay. Do not drink other fluids, diet soda or chew gum.            Aug 07, 2018 12:45 PM CDT   (Arrive by 12:30 PM)   Return Visit with Carter Prather MD   [facility] ([facility])    [address]  [address]  [facility] [address]   [phone]            Aug 10, 2018  1:50 PM CDT   (Arrive by 1:35 PM)   RETURN ENDOCRINE with Zuleyma Gray MD   [facility] Endocrinology ([facility])    [address]  3rd Floor  [facility] [id number] [phone]            Aug 21, 2018 11:15 AM CDT   LAB with  LAB   [facility] ([facility])    [address] [place] [address] [phone]           Please do not eat 10-12 hours before your appointment if you are coming in fasting for labs on lipids, cholesterol, or glucose (sugar). This does not apply to pregnant women. Water, hot tea and black coffee (with nothing added) are okay. Do not drink other fluids, diet soda or chew gum.            Oct 18, 2018 12:45 PM CDT   (Arrive by 12:30 PM)   Return Visit with Yuval Dobbins MD   [facility] Dermatology ([facility])    [address]  [address]rd [facility] [id number] [phone]            George 15, 2019  1:05 PM CST   (Arrive by 12:35 PM)   Return Kidney Transplant with  Kidney/Pancreas Recipient   [facility] Nephrology ([facility])    [address]  [address]  [facility] [id number] [phone]            Jan 18, 2019 11:00 AM CST   (Arrive by 10:45 AM)   Return Liver Transplant with Laureen Galvan MD   [facility] Hepatology ([facility])    [address]  [address]  [facility] [id number] [phone]              Who to contact     Please call your clinic at [phone] to:    Ask questions about your health    Make or cancel appointments    Discuss your medicines    Learn about your test results    Speak to your doctor            Additional Information About Your Visit        MyChart Information     MyChart gives you secure access to your  electronic health record. If you see a primary care provider, you can also send messages to your care team and make appointments. If you have questions, please call your primary care clinic.  If you do not have a primary care provider, please call [phone] and they will assist you.      [email or web address] is an electronic gateway that provides easy, online access to your medical records. With [email or web address], you can request a clinic appointment, read your test results, renew a prescription or communicate with your care team.     To access your existing account, please contact your [facility] or call [phone] for assistance.        Care EveryWhere ID     This is your Care EveryWhere ID. This could be used by other organizations to access your [facility] medical records  [id number]         Blood Pressure from Last 3 Encounters:   07/24/18 132/87   06/21/18 130/78   04/13/18 126/83    Weight from Last 3 Encounters:   07/24/18 87.4 kg (192 lb 11.2 oz)   06/21/18 88.4 kg (194 lb 14.4 oz)   04/13/18 91.8 kg (202 lb 6.1 oz)              Today, you had the following     No orders found for display         Today's Medication Changes          These changes are accurate as of 8/2/18  2:29 PM.  If you have any questions, ask your nurse or doctor.               Start taking these medicines.        Dose/Directions    * ketoconazole 2 % cream   Commonly known as:  NIZORAL   Used for:  Dermatitis, seborrheic   Started by:  Yuval Dobbins MD        Twice daily to areas of rash on face   Quantity:  60 g   Refills:  1       * ketoconazole 2 % shampoo   Commonly known as:  NIZORAL   Used for:  Dermatitis, seborrheic   Started by:  Yuval Dobbins MD        Use as a foaming face wash in shower daily   Quantity:  120 mL   Refills:  3       * Notice:  This list has 2 medication(s) that are the same as other medications prescribed for you. Read the directions carefully, and ask your doctor or other care provider  to review them with you.         Where to get your medicines      These medications were sent to [facility] [place], [place] - [address]  [address], [facility] [address]    Hours:  TRANSPLANT PHONE NUMBER [phone] Phone:  [phone]     ketoconazole 2 % cream    ketoconazole 2 % shampoo                Primary Care Provider    [facility]       No address on file        Equal Access to Services     CLAY VIDES : Hadii aad ku hadasho Soomaali, waaxda luqadaha, qaybta kaalmada adeegyada, waxay idiin hayaan adeeg roderick lawilmer sibley. So [facility] [phone].    ATENCIÓN: Si lulu espfernando, tiene a laura disposición servicios gratuitos de asistencia lingüística. Llame al [phone].    We comply with applicable federal civil rights laws and [place] laws. We do not discriminate on the basis of race, color, national origin, age, disability, sex, sexual orientation, or gender identity.            Thank you!     Thank you for choosing [facility] DERMATOLOG[facility]  for your care. Our goal is always to provide you with excellent care. Hearing back from our patients is one way we can continue to improve our services. Please take a few minutes to complete the written survey that you may receive in the mail after your visit with us. Thank you!             Your Updated Medication List - Protect others around you: Learn how to safely use, store and throw away your medicines at www.disposemymeds.org.          This list is accurate as of 8/2/18  2:29 PM.  Always use your most recent med list.                   Brand Name Dispense Instructions for use Diagnosis    amLODIPine 10 MG tablet    NORVASC    90 tablet    TAKE ONE TABLET BY MOUTH EVERY DAY    HTN (hypertension)       apixaban ANTICOAGULANT 5 MG tablet    ELIQUIS    60 tablet    Take 1 tablet (5 mg) by mouth 2 times daily    Atrial flutter, unspecified type (H)       * ketoconazole 2 % cream     NIZORAL    60 g    Twice daily to areas of rash on face    Dermatitis, seborrheic       * ketoconazole 2 % shampoo    NIZORAL    120 mL    Use as a foaming face wash in shower daily    Dermatitis, seborrheic       levothyroxine 137 MCG tablet    SYNTHROID/LEVOTHROID    90 tablet    Take 1 tablet (137 mcg) by mouth daily    Papillary thyroid carcinoma (H), Postoperative hypothyroidism       lisinopril 5 MG tablet    PRINIVIL/ZESTRIL    90 tablet    TAKE ONE TABLET BY MOUTH EVERY DAY    HTN (hypertension)       magnesium oxide 400 (241.3 Mg) MG tablet    MAG-OX    120 tablet    Take 1 tablet (400 mg) by mouth 2 times daily    Hypomagnesemia       metoprolol succinate 25 MG 24 hr tablet    TOPROL-XL    90 tablet    Take 1 tablet (25 mg) by mouth daily    Atrial flutter, unspecified type (H)       mycophenolate 250 MG capsule    GENERIC EQUIVALENT    60 capsule    Take 1 capsule (250 mg) by mouth 2 times daily    Kidney replaced by transplant       predniSONE 5 MG tablet    DELTASONE    90 tablet    TAKE ONE TABLET BY MOUTH EVERY DAY    Liver replaced by transplant (H), S/P kidney transplant       sulfamethoxazole-trimethoprim 400-80 MG per tablet    BACTRIM/SEPTRA    30 tablet    Take 1 tablet by mouth daily    Liver replaced by transplant (H), S/P kidney transplant       * tacrolimus 1 MG capsule    GENERIC EQUIVALENT    60 capsule    Take 1 capsule (1 mg) by mouth 2 times daily 1.5 mg twice per day.    Liver replaced by transplant (H), S/P kidney transplant       * tacrolimus 0.5 MG capsule    GENERIC EQUIVALENT    60 capsule    Take 1 capsule (0.5 mg) by mouth 2 times daily 1.5 mg twice per day.    S/P kidney transplant       * Notice:  This list has 4 medication(s) that are the same as other medications prescribed for you. Read the directions carefully, and ask your doctor or other care provider to review them with you.

## 2018-08-02 NOTE — LETTER
2018       RE: Cricket Chen  [address]  [facility] [address]     Dear Colleague,    Thank you for referring your patient, Cricket Chen, to the [facility] DERMATOLOGY at [facility]. Please see a copy of my visit note below.    [facility] Dermatology Note      Dermatology Problem List:  1. Seborrheic dermatitis  Rx: Ketoconazole shampoo    Encounter Date: Aug 2, 2018    CC:  Chief Complaint   Patient presents with     Derm Problem     Check spot under right cheek, spot is always dry.     History of Present Illness:  Mr. 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     The rash has been present and relatively stable in severity / distribution for 6 months, appearing for the first time after finishing chemo (agents unknown, maybe Rituximab?, 4 sessions distributed across 4 months). Cricket describes his rash as dry, intermittently scaley and resistant to lotion (aquaphor, generics). He denies any new products and has never had a rash like this before. He also denies pruritus, pain and fluctuation with activity or sun exposure. He has no history of funny moles or skin cancer.     Past Medical History:   Patient Active Problem List   Diagnosis     Atrial flutter (H)     Complete transposition of great vessels     Esophageal varices (H)     Insomnia     Alcoholic hepatitis     History of alcohol abuse     History of transposition of great vessels     Depression     Thrombocytopenia (H)     Pain  medication agreement     [facility]     Liver replaced by transplant (H)     Kidney replaced by transplant     Immunosuppressed status (H)     Hypomagnesemia     Secondary renal hyperparathyroidism (H)     Chronic pain syndrome     Pain management contract signed     Aftercare following organ transplant     Post-transplant lymphoproliferative disorder (H)     Papillary thyroid carcinoma (H)     Advance directive discussed with patient     Acute alcoholic liver disease     Alcohol dependence (H)     Encounter for palliative care     Hypertension secondary to other renal disorders     Atrial fibrillation (H)     Postoperative hypothyroidism     Arm mass, right     Past Medical History:   Diagnosis Date     Alcohol abuse     Last drink in Mid-April 2014     Anemia in ESRD (end-stage renal disease) (H)      Anxiety 2008     Atrial flutter (H)      Cirrhosis (H)     S/P liver transplant     Depression      History of blood transfusion      History of transposition of great vessels     atrial switch at age 8 months old     Hypertension      Liver transplant recipient (H)     2016     Papillary thyroid carcinoma (H)      Pneumonia 11-15-14     Renal transplant recipient     2016     Varices, esophageal (H)      Past Surgical History:   Procedure Laterality Date     ANESTHESIA CARDIOVERSION N/A 3/7/2018    Procedure: ANESTHESIA CARDIOVERSION;  Cardioversion;  Surgeon: GENERIC ANESTHESIA PROVIDER;  Location:  OR     BENCH LIVER N/A 5/10/2016    Procedure: BENCH LIVER;  Surgeon: Ricky Deshpande MD;  Location: [facility] OR     BIOPSY LYMPH NODE CERVICAL Right 1/26/2017    Procedure: BIOPSY LYMPH NODE CERVICAL;  Surgeon: Beka Soto MD;  Location:  OR     CARDIAC SURGERY  9-10-80     CREATE FISTULA ARTERIOVENOUS UPPER EXTREMITY Right 9/16/2014    Procedure: CREATE FISTULA ARTERIOVENOUS UPPER EXTREMITY;  Surgeon: Padmaja Eaton MD;  Location:  OR      CYSTOSCOPY, REMOVE STENT(S), COMBINED Right 2016    Procedure: COMBINED CYSTOSCOPY, REMOVE STENT(S);  Surgeon: Ricky Deshpande MD;  Location:  OR     ENT SURGERY       ESOPHAGOSCOPY, GASTROSCOPY, DUODENOSCOPY (EGD), COMBINED  2014    Procedure: COMBINED ESOPHAGOSCOPY, GASTROSCOPY, DUODENOSCOPY (EGD);  Surgeon: Guillaume Bautista MD;  Location:  GI     ESOPHAGOSCOPY, GASTROSCOPY, DUODENOSCOPY (EGD), COMBINED  14     ESOPHAGOSCOPY, GASTROSCOPY, DUODENOSCOPY (EGD), COMBINED Left 3/12/2015    Procedure: COMBINED ESOPHAGOSCOPY, GASTROSCOPY, DUODENOSCOPY (EGD);  Surgeon: Laureen Galvan MD;  Location:  GI     ESOPHAGOSCOPY, GASTROSCOPY, DUODENOSCOPY (EGD), COMBINED N/A 2016    Procedure: COMBINED ESOPHAGOSCOPY, GASTROSCOPY, DUODENOSCOPY (EGD);  Surgeon: Laureen Galvan MD;  Location:  GI     ESOPHAGOSCOPY, GASTROSCOPY, DUODENOSCOPY (EGD), COMBINED N/A 2016    Procedure: COMBINED ESOPHAGOSCOPY, GASTROSCOPY, DUODENOSCOPY (EGD);  Surgeon: Laureen Galvan MD;  Location:  GI     HC OR CATH ABLATION NON-CARDIAC ENDOVASCULAR      SVT     INSERT PORT VASCULAR ACCESS N/A 4/3/2017    Procedure: INSERT PORT VASCULAR ACCESS;  Surgeon: Rajendra Jacques PA-C;  [facility]     LIGATE FISTULA ARTERIOVENOUS UPPER EXTREMITY Right 2017    Procedure: LIGATE FISTULA ARTERIOVENOUS UPPER EXTREMITY;  Revision of Right Arm Arteriovenous Fistula ;  Surgeon: Padmaja Eaton MD;  [facility]     PICC INSERTION  14    PICC line placement 14; Removal 2014     THORACIC SURGERY  1980    Transposition great arteries, repaired at 8 months     THYROIDECTOMY Right 2017    Procedure: THYROIDECTOMY;  Bilateral Total Thyroidectomy ;  Surgeon: Beka Soto MD;  Location:  OR     TRANSPLANT KIDNEY RECIPIENT  DONOR  5/10/2016    Procedure: TRANSPLANT KIDNEY RECIPIENT  DONOR;  Surgeon: Ricky Deshpande MD;  Location:   OR     TRANSPLANT LIVER RECIPIENT  DONOR N/A 5/10/2016    Procedure: TRANSPLANT LIVER RECIPIENT  DONOR;  Surgeon: Ricky Deshpande MD;  Location:  OR       Social History:  The patient works in  education. He doesn't use sunscreen but has very limited sun exposure and wears baseball caps. He is a distant smoker ('99) and former alcoholic.     Family History:  There is no family history of skin cancer or atypical moles.     Medications:  Current Outpatient Prescriptions   Medication Sig Dispense Refill     amLODIPine (NORVASC) 10 MG tablet TAKE ONE TABLET BY MOUTH EVERY DAY 90 tablet 3     apixaban ANTICOAGULANT (ELIQUIS) 5 MG tablet Take 1 tablet (5 mg) by mouth 2 times daily 60 tablet 3     levothyroxine (SYNTHROID/LEVOTHROID) 137 MCG tablet Take 1 tablet (137 mcg) by mouth daily 90 tablet 3     lisinopril (PRINIVIL/ZESTRIL) 5 MG tablet TAKE ONE TABLET BY MOUTH EVERY DAY 90 tablet 3     magnesium oxide (MAG-OX) 400 (241.3 MG) MG tablet Take 1 tablet (400 mg) by mouth 2 times daily 120 tablet 11     metoprolol succinate (TOPROL-XL) 25 MG 24 hr tablet Take 1 tablet (25 mg) by mouth daily 90 tablet 3     mycophenolate (GENERIC EQUIVALENT) 250 MG capsule Take 1 capsule (250 mg) by mouth 2 times daily 60 capsule 11     predniSONE (DELTASONE) 5 MG tablet TAKE ONE TABLET BY MOUTH EVERY DAY 90 tablet 3     sulfamethoxazole-trimethoprim (BACTRIM/SEPTRA) 400-80 MG per tablet Take 1 tablet by mouth daily 30 tablet 11     tacrolimus (GENERIC EQUIVALENT) 0.5 MG capsule Take 1 capsule (0.5 mg) by mouth 2 times daily 1.5 mg twice per day. 60 capsule 5     tacrolimus (GENERIC EQUIVALENT) 1 MG capsule Take 1 capsule (1 mg) by mouth 2 times daily 1.5 mg twice per day. 60 capsule 5     Allergies   Allergen Reactions     Hydromorphone Itching     Review of Systems:  -As per HPI  -Constitutional: The patient denies fatigue, fevers, chills, unintended weight loss, and night sweats.  -Skin: As above in HPI.  No additional skin concerns.    Physical exam:  Vitals: There were no vitals taken for this visit.  GEN: This is a well developed, well-nourished male in no acute distress, in a pleasant mood.    SKIN: Total skin excluding the undergarment areas was performed. The exam included the head/face, neck, both arms, chest, back, abdomen, both legs, digits and/or nails.   - pigmented nevus with well-delineated borders and homogenous network on dermoscopy visible on posterior aspect of left calf  - tattoos present on chest, back and upper arms bilaterally  - faintly erythematous, scaling, ill-defined plaque superior to the R mesolabial fold  - patchy vascular stain anterior to R auricle    - well-healed linear scars from liver and kidney transplants  - AV fistula visualized on the inner aspect of the right arm  - No other lesions of concern on areas examined.     Impression/Plan:  1. Seborrheic dermatitis    Benign and chronic nature of condition was discussed with the patient     Start MARIKA/Nizoral shampoo on face and scalp daily, augmenting with SelSun blue or selenium-sulfide equivalent if recalcitrant to MARIKA    May consider protopic as an adjuvant treatment in the future     Follow-up in 2-3 months if rash persists despite therapy.      Staff Involved:  Scribed by Felisha Trotter, MS4 for Dr. Dobbins.      The medical student acted as a scribe with respect to this patient.  I have performed all the key elements of the history and physical.    Yuval Dobbins MD  Dermatology Attending      Again, thank you for allowing me to participate in the care of your patient.      Sincerely,    Yuval Dobbins MD

## 2018-08-02 NOTE — NURSING NOTE
Chief Complaint   Patient presents with     Derm Problem     Check spot under right cheek, spot is always dry.     Cynthia Oliva CMA

## 2018-08-03 ENCOUNTER — TELEPHONE (OUTPATIENT)
Dept: VASCULAR SURGERY | Facility: CLINIC | Age: 38
End: 2018-08-03

## 2018-08-03 DIAGNOSIS — I48.92 ATRIAL FLUTTER, UNSPECIFIED TYPE (H): ICD-10-CM

## 2018-08-03 NOTE — TELEPHONE ENCOUNTER
Medication appears to be managed by cardiology. Will send to cardiology.     Unclear of patient's PCP. Last saw Lisa in [facility] for an acute issue, Lisa has graduated and is no longer in clinic. Previous visits in September 2016, also for acute issues. Patient will need to re-establish care with a primary care provider for an annual physical if management by PCP needed.    Lizette Izaguirre RN      Managed/refilled by cardiology.    Lizette Izaguirre RN

## 2018-08-03 NOTE — TELEPHONE ENCOUNTER
Per task, pt needs to schedule PAC before 8/17 surgery. Talked with pt and offered him 8/13 at 730. Pt ok with that but would like something later. PAC is full. Will call If anything changes

## 2018-08-03 NOTE — TELEPHONE ENCOUNTER
Eliquis 5mg  Qty 60  Last Fill 05/30/2018    Pt is out of med    Thank you  Mariana Menezes [facility]

## 2018-08-03 NOTE — PATIENT INSTRUCTIONS
We have scheduled you for your surgery with Dr Payton on 8/17/18.  We will schedule an appointment with our pre operative assesment clinic for completion of your history and physical.    Please stop taking your Eliquis 2 days prior to your surgery.    Let me know if you have any questions or concerns.    Beth Mcclelland RN  [phone]

## 2018-08-03 NOTE — TELEPHONE ENCOUNTER
apixaban ANTICOAGULANT (ELIQUIS) 5 MG tablet  Last Written Prescription Date:  3/30/18  Last Fill Quantity: 60,   # refills: 3  Last Office Visit : 6/21/18  Future Office visit:  None      Routing  Because: labs

## 2018-08-06 ENCOUNTER — OFFICE VISIT (OUTPATIENT)
Dept: NEPHROLOGY | Facility: CLINIC | Age: 38
End: 2018-08-06
Attending: INTERNAL MEDICINE
Payer: COMMERCIAL

## 2018-08-06 VITALS
WEIGHT: 189.8 LBS | HEART RATE: 68 BPM | DIASTOLIC BLOOD PRESSURE: 82 MMHG | SYSTOLIC BLOOD PRESSURE: 125 MMHG | BODY MASS INDEX: 27.17 KG/M2 | RESPIRATION RATE: 18 BRPM | HEIGHT: 70 IN

## 2018-08-06 DIAGNOSIS — Z92.25 HISTORY OF IMMUNOSUPPRESSION THERAPY: ICD-10-CM

## 2018-08-06 DIAGNOSIS — I15.1 HYPERTENSION SECONDARY TO OTHER RENAL DISORDERS: ICD-10-CM

## 2018-08-06 DIAGNOSIS — Z94.4 LIVER REPLACED BY TRANSPLANT (H): ICD-10-CM

## 2018-08-06 DIAGNOSIS — Z29.89 NEED FOR PNEUMOCYSTIS PROPHYLAXIS: ICD-10-CM

## 2018-08-06 DIAGNOSIS — B27.00 EBV (EPSTEIN-BARR VIRUS) VIREMIA: ICD-10-CM

## 2018-08-06 DIAGNOSIS — N25.81 SECONDARY RENAL HYPERPARATHYROIDISM (H): ICD-10-CM

## 2018-08-06 DIAGNOSIS — D69.6 THROMBOCYTOPENIA (H): ICD-10-CM

## 2018-08-06 DIAGNOSIS — I48.92 ATRIAL FLUTTER, UNSPECIFIED TYPE (H): ICD-10-CM

## 2018-08-06 DIAGNOSIS — Z94.0 KIDNEY REPLACED BY TRANSPLANT: ICD-10-CM

## 2018-08-06 DIAGNOSIS — Z48.298 AFTERCARE FOLLOWING ORGAN TRANSPLANT: Primary | ICD-10-CM

## 2018-08-06 DIAGNOSIS — Z94.0 S/P KIDNEY TRANSPLANT: ICD-10-CM

## 2018-08-06 PROCEDURE — G0463 HOSPITAL OUTPT CLINIC VISIT: HCPCS | Mod: ZF

## 2018-08-06 RX ORDER — PREDNISONE 5 MG/1
TABLET ORAL
Qty: 90 TABLET | Refills: 3 | COMMUNITY
Start: 2018-08-06 | End: 2019-01-16

## 2018-08-06 ASSESSMENT — PAIN SCALES - GENERAL: PAINLEVEL: NO PAIN (0)

## 2018-08-06 NOTE — MR AVS SNAPSHOT
After Visit Summary   8/6/2018    Cricket Chen    MRN: [id number]           Patient Information     Date Of Birth          1980        Visit Information        Provider Department      8/6/2018 2:20 PM Recipient, [facility]        Today's Diagnoses     Liver replaced by transplant (H)        S/P kidney transplant           Follow-ups after your visit        Follow-up notes from your care team     Return in about 6 months (around 2/6/2019) for Routine Visit.      Your next 10 appointments already scheduled     Aug 07, 2018 11:15 AM CDT   LAB with  LAB   [facility] ([facility])    [address] [address]   [phone]           Please do not eat 10-12 hours before your appointment if you are coming in fasting for labs on lipids, cholesterol, or glucose (sugar). This does not apply to pregnant women. Water, hot tea and black coffee (with nothing added) are okay. Do not drink other fluids, diet soda or chew gum.            Aug 07, 2018 12:45 PM CDT   (Arrive by 12:30 PM)   Return Visit with Carter Prather MD   [facility] ([facility])    [address]  [address] [place] [id number] [phone]            Aug 10, 2018  1:50 PM CDT   (Arrive by 1:35 PM)   RETURN ENDOCRINE with Zuleyma Gray MD   [facility] ([facility])    [address]  3rd Floor  [facility] [id number] [phone]            Aug 13, 2018  7:30 AM CDT   (Arrive by 7:15 AM)   PAC EVALUATION with DARYN Chakraborty CNP   [facility] ([facility])    [address]  4th Floor  [facility] [id number] [phone]            Aug 13, 2018  8:30 AM CDT   (Arrive by 8:15 AM)   PAC RN ASSESSMENT with Destiny Pac Rn   [facility] ([facility]  [place])    [address] [place] [address]  [address] [facility] [address] [phone]            Aug 13, 2018  9:00 AM CDT   (Arrive by 8:45 AM)   PAC Anesthesia Consult with  Pac Anesthesiologist   [facility] ([facility])    [address] [place] [address]  [address] [facility] [id number] [phone]            Aug 17, 2018   Procedure with John Payton MD   [facility] [place] (--)    [address]  [place] [address] [phone]            Aug 21, 2018 11:15 AM CDT   LAB with  LAB   [facility]    [address] [address] [phone]           Please do not eat 10-12 hours before your appointment if you are coming in fasting for labs on lipids, cholesterol, or glucose (sugar). This does not apply to pregnant women. Water, hot tea and black coffee (with nothing added) are okay. Do not drink other fluids, diet soda or chew gum.            Oct 18, 2018 12:45 PM CDT   (Arrive by 12:30 PM)   Return Visit with Yuval Dobbins MD   [facility] Dermat[facility] ([facility])    [address] [place] [id number] [phone]            Jan 18, 2019 11:00 AM CST   (Arrive by 10:45 AM)   Return Liver Transplant with Laureen Galvan MD   [facility] Hepat[facility] ([facility])    [address] [place] [address]  [address]  [facility] [id number] [phone]              Who to contact     If you have questions or need follow up information about today's clinic visit or your schedule please contact [facility] directly at [phone].  Normal or non-critical lab and imaging results will be communicated to you by MyChart, letter or phone within 4 business days after the clinic has received the results. If you do not hear from us within 7 days, please contact the clinic through MyChart or phone. If you have a  "critical or abnormal lab result, we will notify you by phone as soon as possible.  Submit refill requests through [employer] or call your pharmacy and they will forward the refill request to us. Please allow 3 business days for your refill to be completed.          Additional Information About Your Visit        HitchedPichart Information     [employer] gives you secure access to your electronic health record. If you see a primary care provider, you can also send messages to your care team and make appointments. If you have questions, please call your primary care clinic.  If you do not have a primary care provider, please call [phone] and they will assist you.        Care EveryWhere ID     This is your Care EveryWhere ID. This could be used by other organizations to access your [facility] medical records  [id number]        Your Vitals Were     Pulse Respirations Height BMI (Body Mass Index)          68 18 1.778 m (5' 10\") 27.23 kg/m2         Blood Pressure from Last 3 Encounters:   08/06/18 125/82   08/02/18 126/73   07/24/18 132/87    Weight from Last 3 Encounters:   08/06/18 86.1 kg (189 lb 12.8 oz)   07/24/18 87.4 kg (192 lb 11.2 oz)   06/21/18 88.4 kg (194 lb 14.4 oz)              Today, you had the following     No orders found for display         Today's Medication Changes          These changes are accurate as of 8/6/18  3:11 PM.  If you have any questions, ask your nurse or doctor.               These medicines have changed or have updated prescriptions.        Dose/Directions    predniSONE 5 MG tablet   Commonly known as:  DELTASONE   This may have changed:  See the new instructions.   Used for:  Liver replaced by transplant (H), S/P kidney transplant   Changed by:  Recipient, [facility]        Take 2.5 mg once daily for 14 days, then stop   Quantity:  90 tablet   Refills:  3                Primary Care Provider    [facility]       No address on file        Equal Access to Services  "    CLAY VIDES : Hadii aad ku jaydon Barroso, waaxda luqadaha, qaybta kaalmada ramy, robina gladisin hayaanestor medinasusie vaughn christopher . So [facility] [phone].    ATENCIÓN: Si habla ulises, tiene a laura disposición servicios gratuitos de asistencia lingüística. Llame al [phone].    We comply with applicable federal civil rights laws and [place] laws. We do not discriminate on the basis of race, color, national origin, age, disability, sex, sexual orientation, or gender identity.            Thank you!     Thank you for choosing [facility]  for your care. Our goal is always to provide you with excellent care. Hearing back from our patients is one way we can continue to improve our services. Please take a few minutes to complete the written survey that you may receive in the mail after your visit with us. Thank you!             Your Updated Medication List - Protect others around you: Learn how to safely use, store and throw away your medicines at www.disposemymeds.org.          This list is accurate as of 8/6/18  3:11 PM.  Always use your most recent med list.                   Brand Name Dispense Instructions for use Diagnosis    amLODIPine 10 MG tablet    NORVASC    90 tablet    TAKE ONE TABLET BY MOUTH EVERY DAY    HTN (hypertension)       apixaban ANTICOAGULANT 5 MG tablet    ELIQUIS    180 tablet    Take 1 tablet (5 mg) by mouth 2 times daily    Atrial flutter, unspecified type (H)       * ketoconazole 2 % cream    NIZORAL    60 g    Twice daily to areas of rash on face    Dermatitis, seborrheic       * ketoconazole 2 % shampoo    NIZORAL    120 mL    Use as a foaming face wash in shower daily    Dermatitis, seborrheic       levothyroxine 137 MCG tablet    SYNTHROID/LEVOTHROID    90 tablet    Take 1 tablet (137 mcg) by mouth daily    Papillary thyroid carcinoma (H), Postoperative hypothyroidism       lisinopril 5 MG tablet    PRINIVIL/ZESTRIL    90 tablet    TAKE ONE TABLET BY MOUTH EVERY DAY    HTN  (hypertension)       magnesium oxide 400 (241.3 Mg) MG tablet    MAG-OX    120 tablet    Take 1 tablet (400 mg) by mouth 2 times daily    Hypomagnesemia       metoprolol succinate 25 MG 24 hr tablet    TOPROL-XL    90 tablet    Take 1 tablet (25 mg) by mouth daily    Atrial flutter, unspecified type (H)       mycophenolate 250 MG capsule    GENERIC EQUIVALENT    60 capsule    Take 1 capsule (250 mg) by mouth 2 times daily    Kidney replaced by transplant       predniSONE 5 MG tablet    DELTASONE    90 tablet    Take 2.5 mg once daily for 14 days, then stop    Liver replaced by transplant (H), S/P kidney transplant       sulfamethoxazole-trimethoprim 400-80 MG per tablet    BACTRIM/SEPTRA    30 tablet    Take 1 tablet by mouth daily    Liver replaced by transplant (H), S/P kidney transplant       * tacrolimus 1 MG capsule    GENERIC EQUIVALENT    60 capsule    Take 1 capsule (1 mg) by mouth 2 times daily 1.5 mg twice per day.    Liver replaced by transplant (H), S/P kidney transplant       * tacrolimus 0.5 MG capsule    GENERIC EQUIVALENT    60 capsule    Take 1 capsule (0.5 mg) by mouth 2 times daily 1.5 mg twice per day.    S/P kidney transplant       * Notice:  This list has 4 medication(s) that are the same as other medications prescribed for you. Read the directions carefully, and ask your doctor or other care provider to review them with you.

## 2018-08-06 NOTE — LETTER
2018      RE: Cricket Conroynelli  [address]  [facility] [id number]       TRANSPLANT NEPHROLOGY VISIT    Assessment & Plan   # DDKT: basline Cr ~ 1.2-1.5; Increased.  Etiology of uptrending creatinine is unclear.  He has overall good kidney function.  Will plan on tapering off of prednisone and monitoring renal function.  If worsening above 1.5, consider renal biopsy.   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 He has improving levels with reduction on IS.  Will monitor with stopping prednisone as described above.    # Immunosuppression: Tacrolimus immediate release (goal  4-6), Mycophenolate mofetil (goal  1-3.5) and Prednisone (dose 5 mg daily)   - Changes: Yes - prednisone taper started today.  He was counselled on the risk of rejection with stopping prednisone.  He will taper over the next week then stop prednisone.    # Prophylaxis:   - PJP: Bactrim   - CMV: none    # Transplant History:    Transplant: 2016 (Kidney), 5/10/2016 (Liver)    Donor Type:  - Cardiac Death Donor Class:    Crossmatch at time of Tx: negative    DSA at time of Tx: No    Latest DSA: No Date of last check: 2017   Significant changes in immunosuppression: mycophenolate reduced due to history of BK viremia, prednisone started for immunsuppression during chemotherapy    Biopsy: No Rejection History:No   CMV IgG Ab Discordance (D+/R-): No    EBV IgG Ab Discordance (D+/R-): No    History of BK Viremia: Yes    Significant Complications: BK viremia, PTLD, Papillary thyroid cancer    Transplant Office Phone Number: [phone]    # Hypertension: controlled; Goal BP: 130/80   - Changes: No    # Mineral Bone Disorder:   - Secondary renal hyperparathyroidism: mildly elevated last year.  Recommend no changes at this time.   - Vitamin D: normal levels when last checked.  Not on supplementation   -  Calcium: within normal limts    # Other Medical Issues:   # A flutter - on elliquis and beta blocker.  Previously failed ablation therapy.  Follows with EP.    # Hypomagnesemia - normal serum magnesium level with last check and will continue on oral magnesium supplement.    # Thrombocytopenia - stable platelet level, generally 80-120s.  This is secondary to splenomegaly.     # PTLD, s/p rituximab and chemotherapy - no evidence of recurrence.  Patie nt is followed by Oncology.    # EBV viremia - negative EBV viremia with last check.  WIll recheck if developing further symptoms.    # Papillary thyroid cancer, s/p thyroidectomy - doing well on levothyroxine.    # Skin cancer risk - no new skin lesions.  Recommend regular follow up with Dermatology.    Return visit: No Follow-up on file.    Assessment and plan was discussed with the patient and he voiced his understanding and agreement.    Viral Fernie West MD    Chief Complaint   Mr. Chen is a 37 year old here for routine follow up    History of Present Illness   Cricket Chen is a 36 year old male with ESKD from [facility] and ESLD secondary alcoholic cirrhosis and is status post DDKT and liver transplant on 5/11/16.    Mr. Chen was last in clinic 1/2018.  He follows with several providers including oncology for history of PTLD and papillary thyroid cancer.  He received chemoterapy and rituximab with resolution of PTLD.  While receiving chemotherapy he was placed on prednisone for immunosuppression.  He denies history of rejection.  He had thyroidecotmy for treatment of the papaillary cancer.    He has transposition of the great vessels and reduced cardiac function.      Recent Hospitalizations:  [] No [x] Yes Hospitalized for aflutter in 4/2018.  Cardioverted in [facility].  Two weeks ago cardiologist. On eliquis and metoprolol.  H/o of ablation.   New Medical Issues: [x] No [] Yes    Decreased energy: [x] No [] Yes    Chest pain or SOB with exertion:  [x] No [] Yes     Appetite change or weight change: [x] No [] Yes    Nausea, vomiting or diarrhea:  [x] No [] Yes    Fever, sweats or chills: [x] No [] Yes    Leg swelling: [x] No [] Yes      Other medical issues:  No    Home BP: Not checked    Review of Systems   A comprehensive review of systems was obtained and negative, except as noted in the HPI or PMH.    Problem List   Patient Active Problem List   Diagnosis     Atrial flutter (H)     Complete transposition of great vessels     Esophageal varices (H)     Insomnia     Alcoholic hepatitis     History of alcohol abuse     History of transposition of great vessels     Depression     Thrombocytopenia (H)     Pain medication agreement     [facility]     Liver replaced by transplant (H)     Kidney replaced by transplant     Immunosuppressed status (H)     Hypomagnesemia     Secondary renal hyperparathyroidism (H)     Chronic pain syndrome     Pain management contract signed     Aftercare following organ transplant     Post-transplant lymphoproliferative disorder (H)     Papillary thyroid carcinoma (H)     Advance directive discussed with patient     Acute alcoholic liver disease     Alcohol dependence (H)     Encounter for palliative care     Hypertension secondary to other renal disorders     Atrial fibrillation (H)     Postoperative hypothyroidism     Arm mass, right       Social History   Social History   Substance Use Topics     Smoking status: Former Smoker     Packs/day: 0.33     Years: 3.00     Types: Cigarettes     Start date: 8/20/1997     Quit date: 9/21/2000     Smokeless tobacco: Former User     Alcohol use No      Comment: ~10 drinks per day for ten years, quit in April of 2014       Allergies   Allergies   Allergen Reactions     Hydromorphone Itching       Medications   Current Outpatient Prescriptions   Medication Sig     amLODIPine (NORVASC) 10 MG tablet TAKE ONE TABLET BY MOUTH EVERY DAY      "apixaban ANTICOAGULANT (ELIQUIS) 5 MG tablet Take 1 tablet (5 mg) by mouth 2 times daily     ketoconazole (NIZORAL) 2 % cream Twice daily to areas of rash on face     ketoconazole (NIZORAL) 2 % shampoo Use as a foaming face wash in shower daily     levothyroxine (SYNTHROID/LEVOTHROID) 137 MCG tablet Take 1 tablet (137 mcg) by mouth daily     lisinopril (PRINIVIL/ZESTRIL) 5 MG tablet TAKE ONE TABLET BY MOUTH EVERY DAY     magnesium oxide (MAG-OX) 400 (241.3 MG) MG tablet Take 1 tablet (400 mg) by mouth 2 times daily     metoprolol succinate (TOPROL-XL) 25 MG 24 hr tablet Take 1 tablet (25 mg) by mouth daily     mycophenolate (GENERIC EQUIVALENT) 250 MG capsule Take 1 capsule (250 mg) by mouth 2 times daily     predniSONE (DELTASONE) 5 MG tablet TAKE ONE TABLET BY MOUTH EVERY DAY     sulfamethoxazole-trimethoprim (BACTRIM/SEPTRA) 400-80 MG per tablet Take 1 tablet by mouth daily     tacrolimus (GENERIC EQUIVALENT) 0.5 MG capsule Take 1 capsule (0.5 mg) by mouth 2 times daily 1.5 mg twice per day.     tacrolimus (GENERIC EQUIVALENT) 1 MG capsule Take 1 capsule (1 mg) by mouth 2 times daily 1.5 mg twice per day.     No current facility-administered medications for this visit.      There are no discontinued medications.    Physical Exam   Vital Signs: /82  Pulse 68  Resp 18  Ht 1.778 m (5' 10\")  Wt 86.1 kg (189 lb 12.8 oz)  BMI 27.23 kg/m2  GENERAL APPEARANCE: alert and no distress  HENT: mouth without ulcers or lesions  LYMPHATICS: no cervical or supraclavicular nodes  RESP: lungs clear to auscultation - no rales, rhonchi or wheezes  CV: regular rhythm, normal rate, no rub, no murmur  EDEMA: no LE edema bilaterally  ABDOMEN: soft, nondistended, nontender, bowel sounds normal  MS: extremities normal - no gross deformities noted, no evidence of inflammation in joints, no muscle tenderness  SKIN: no rash  TX KIDNEY: normal    Data   Renal -   Recent Labs   Lab Test  07/25/18   1721  07/23/18   1110  07/10/18   " 1113   NA  136  141  140   POTASSIUM  4.2  4.0  4.1   CHLORIDE  104  107  106   CO2  25  30  25   BUN  18  18  18   CR  1.39*  1.50*  1.44*   GLC  106*  91  94   COLBY  9.0  9.4  9.4     Recent Labs   Lab Test  06/12/18   1123  04/17/18   1129  03/06/18   1255   05/17/17   0806  04/26/17   1010  04/18/17   1027   MAG  1.8  2.1  2.2   < >  2.4*  2.4*  2.0   PHOS   --    --    --    --   3.1  3.0  3.2    < > = values in this interval not displayed.     Recent Labs   Lab Test  08/07/17   1700  08/07/17   1527  06/29/17   1056   PTHI  167*  109*  173*     Recent Labs   Lab Test  06/29/17   1056  01/03/17   1323  10/14/14   0708   VITDT  38  13*  35       CBC -   Recent Labs   Lab Test  07/25/18   1721  07/23/18   1110  07/10/18   1113   WBC  6.1  4.9  5.1   HGB  15.0  15.3  15.3   PLT  127*  126*  120*       LFTs -   Recent Labs   Lab Test  07/25/18   1721  06/12/18   1123  04/17/18   1129   ALKPHOS  71  77  67   BILITOTAL  0.5  0.5  0.7   ALT  22  23  20   AST  16  17  18   PROTTOTAL  7.3  7.1  7.2   ALBUMIN  4.2  4.0  4.2       Pancreas -  Recent Labs   Lab Test  05/12/16   0336   A1C  5.1     Recent Labs   Lab Test  03/06/18   1255  05/12/16   0336  05/10/16   1327  11/22/14   2220   AMYLASE   --   47  100   --    LIPASE  95  125   --   318       Iron Panel -   Recent Labs   Lab Test  07/14/16   1052  06/21/14   0726   IRON  84  101   IRONSAT  29  71*   BRIELLE  171   --        Transplant -   Recent Labs   Lab Test  01/03/17   1323  08/29/16   1745   CSPEC  Plasma, EDTA anticoagulant  Plasma, EDTA anticoagulant   CMVQNT  CMV DNA Not Detected   The ANNA AmpliPrep/ANNA TaqMan CMV Test is an FDA-approved in vitro nucleic   acid amplification test for the quantitation of cytomegalovirus DNA in human   plasma (EDTA plasma) using the ANNA AmpliPrep Instrument for automated viral   nucleic acid extraction and the ANNA TaqMan Analyzer or ANNA TaqMan for   automated Real Time amplification and detection of the viral nucleic  acid   target.   Titer results are reported in International Units/mL (IU/mL using 1st WHO   International standard for Human Cytomegalovirus for Nucleic Acid Amplification   based assays. The conversion factor between CMV DNA copis/mL (as defined by the   Roche ANNA TaqMan CMV test) and International Units is the CMV DNA   concentration in IU/mL x 1.1 copies/IU = CMV DNA in copies/mL.   This assay has received FDA approval for the testing of human plasma only. The   [facility] at the [facility], has validated the pe rformance characteristics of the Roche   CMV assay for plasma, bronchial alveolar lavage/wash and urine.    CMV DNA Not Detected   The ANNA AmpliPrep/ANNA TaqMan CMV Test is an FDA-approved in vitro nucleic   acid amplification test for the quantitation of cytomegalovirus DNA in human   plasma (EDTA plasma) using the ANNA AmpliPrep Instrument for automated viral   nucleic acid extraction and the ANNA TaqMan Analyzer or ANNA TaqMan for   automated Real Time amplification and detection of the viral nucleic acid   target.   Titer results are reported in International Units/mL (IU/mL using 1st WHO   International standard for Human Cytomegalovirus for Nucleic Acid Amplification   based assays. The conversion factor between CMV DNA copis/mL (as defined by the   Roche ANNA TaqMan CMV test) and International Units is the CMV DNA   concentration in IU/mL x 1.1 copies/IU = CMV DNA in copies/mL.   This assay has received FDA approval for the testing of human plasma only. The   In[facility] Disease Diagnostic Laboratory at the [facility], [place], has validated the pe rformance characteristics of the Roche   CMV assay for plasma, bronchial alveolar lavage/wash and urine.     CMVLOG  Not Calculated  Not Calculated     Recent Labs   Lab Test  01/26/18   1121  03/08/17   1215  02/09/17   1052   EBRES  EBV DNA Not  Detected  EBV DNA Not Detected  4720*   EBLOG  Not Calculated  Not Calculated   The Real-Time quantitative EBV assay was developed and its performance   characteristics determined by the Infect[facility] Diagnostic Laboratory at   the [facility] in [place], [place].  The   primers and probes are Analyte Specific Reagents (ASRs) manufactured  by   [employer].   ASRs are used in many laboratory tests necessary for standard medical care and   generally do not require U.S. Food and Drug Administration approval.  The FDA   has determined that such clearance or approval is not necessary.  This test is   used for clinical purposes.  It should not be regarded as investigational or   research.   This laboratory is certified under the Clinical Laboratory Improvement   Amendments of 1988 (CLIA-88) as qualified to perform high complexity clinical   laboratory testing.   The quantitative range of this assay is 500-22,500,00 copies/mL (2.7-7.4 log   copies/mL).  A negative result does not rule out the presence of PCR inhibitors    in the patient specimen or EBV DNA nucleic acid in concentrations below the   level of detection of the assay.  Inhibition may also lead to underestimation   of viral quantitation.    3.7*     Recent Labs   Lab Test  07/25/18   1722  07/23/18   1110  06/26/18   1112   BKRES  5754*  9988*  98107*   BKLOG  3.8*  4.0*  4.0*       Recent Labs   Lab Test  07/23/18   1111  07/10/18   1114  06/26/18   1112   DOSTAC  7/22/18 2315  7/09/18 2315  6/25/18 2300   TACROL  4.5*  4.6*  3.9*     Recent Labs   Lab Test  05/19/16   0714   DOSMPA  1900   MPACID  <0.25*   MPAG  51.0       Kidney/Pancreas Recipient

## 2018-08-06 NOTE — PROGRESS NOTES
TRANSPLANT NEPHROLOGY VISIT    Assessment & Plan   # DDKT: basline Cr ~ 1.2-1.5; Increased.  Etiology of uptrending creatinine is unclear.  He has overall good kidney function.  Will plan on tapering off of prednisone and monitoring renal function.  If worsening above 1.5, consider renal biopsy.   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 He has improving levels with reduction on IS.  Will monitor with stopping prednisone as described above.    # Immunosuppression: Tacrolimus immediate release (goal  4-6), Mycophenolate mofetil (goal  1-3.5) and Prednisone (dose 5 mg daily)   - Changes: Yes - prednisone taper started today.  He was counselled on the risk of rejection with stopping prednisone.  He will taper over the next week then stop prednisone.    # Prophylaxis:   - PJP: Bactrim   - CMV: none    # Transplant History:    Transplant: 2016 (Kidney), 5/10/2016 (Liver)    Donor Type:  - Cardiac Death Donor Class:    Crossmatch at time of Tx: negative    DSA at time of Tx: No    Latest DSA: No Date of last check: 2017   Significant changes in immunosuppression: mycophenolate reduced due to history of BK viremia, prednisone started for immunsuppression during chemotherapy    Biopsy: No Rejection History:No   CMV IgG Ab Discordance (D+/R-): No    EBV IgG Ab Discordance (D+/R-): No    History of BK Viremia: Yes    Significant Complications: BK viremia, PTLD, Papillary thyroid cancer    Transplant Office Phone Number: [phone]    # Hypertension: controlled; Goal BP: 130/80   - Changes: No    # Mineral Bone Disorder:   - Secondary renal hyperparathyroidism: mildly elevated last year.  Recommend no changes at this time.   - Vitamin D: normal levels when last checked.  Not on supplementation   - Calcium: within normal limts    # Other Medical Issues:   # A flutter - on elliquis and beta  blocker.  Previously failed ablation therapy.  Follows with EP.    # Hypomagnesemia - normal serum magnesium level with last check and will continue on oral magnesium supplement.    # Thrombocytopenia - stable platelet level, generally 80-120s.  This is secondary to splenomegaly.     # PTLD, s/p rituximab and chemotherapy - no evidence of recurrence.  Patient is followed by Oncology.    # EBV viremia - negative EBV viremia with last check.  WIll recheck if developing further symptoms.    # Papillary thyroid cancer, s/p thyroidectomy - doing well on levothyroxine.    # Skin cancer risk - no new skin lesions.  Recommend regular follow up with Dermatology.    Return visit: No Follow-up on file.    Assessment and plan was discussed with the patient and he voiced his understanding and agreement.    Viral Fernie West MD    Chief Complaint   Mr. Chen is a 37 year old here for routine follow up    History of Present Illness   Cricket Chen is a 36 year old male with ESKD from [facility] and ESLD secondary alcoholic cirrhosis and is status post DDKT and liver transplant on 5/11/16.    Mr. Chen was last in clinic 1/2018.  He follows with several providers including oncology for history of PTLD and papillary thyroid cancer.  He received chemoterapy and rituximab with resolution of PTLD.  While receiving chemotherapy he was placed on prednisone for immunosuppression.  He denies history of rejection.  He had thyroidecotmy for treatment of the papaillary cancer.    He has transposition of the great vessels and reduced cardiac function.      Recent Hospitalizations:  [] No [x] Yes Hospitalized for aflutter in 4/2018.  Cardioverted in [facility].  Two weeks ago cardiologist. On eliquis and metoprolol.  H/o of ablation.   New Medical Issues: [x] No [] Yes    Decreased energy: [x] No [] Yes    Chest pain or SOB with exertion:  [x] No [] Yes    Appetite change or weight change: [x] No [] Yes    Nausea, vomiting or diarrhea:  [x] No []  Yes    Fever, sweats or chills: [x] No [] Yes    Leg swelling: [x] No [] Yes      Other medical issues:  No    Home BP: Not checked    Review of Systems   A comprehensive review of systems was obtained and negative, except as noted in the HPI or PMH.    Problem List   Patient Active Problem List   Diagnosis     Atrial flutter (H)     Complete transposition of great vessels     Esophageal varices (H)     Insomnia     Alcoholic hepatitis     History of alcohol abuse     History of transposition of great vessels     Depression     Thrombocytopenia (H)     Pain medication agreement     [facility]     Liver replaced by transplant (H)     Kidney replaced by transplant     Immunosuppressed status (H)     Hypomagnesemia     Secondary renal hyperparathyroidism (H)     Chronic pain syndrome     Pain management contract signed     Aftercare following organ transplant     Post-transplant lymphoproliferative disorder (H)     Papillary thyroid carcinoma (H)     Advance directive discussed with patient     Acute alcoholic liver disease     Alcohol dependence (H)     Encounter for palliative care     Hypertension secondary to other renal disorders     Atrial fibrillation (H)     Postoperative hypothyroidism     Arm mass, right       Social History   Social History   Substance Use Topics     Smoking status: Former Smoker     Packs/day: 0.33     Years: 3.00     Types: Cigarettes     Start date: 8/20/1997     Quit date: 9/21/2000     Smokeless tobacco: Former User     Alcohol use No      Comment: ~10 drinks per day for ten years, quit in April of 2014       Allergies   Allergies   Allergen Reactions     Hydromorphone Itching       Medications   Current Outpatient Prescriptions   Medication Sig     amLODIPine (NORVASC) 10 MG tablet TAKE ONE TABLET BY MOUTH EVERY DAY     apixaban ANTICOAGULANT (ELIQUIS) 5 MG tablet Take 1 tablet (5 mg) by mouth 2 times daily      "ketoconazole (NIZORAL) 2 % cream Twice daily to areas of rash on face     ketoconazole (NIZORAL) 2 % shampoo Use as a foaming face wash in shower daily     levothyroxine (SYNTHROID/LEVOTHROID) 137 MCG tablet Take 1 tablet (137 mcg) by mouth daily     lisinopril (PRINIVIL/ZESTRIL) 5 MG tablet TAKE ONE TABLET BY MOUTH EVERY DAY     magnesium oxide (MAG-OX) 400 (241.3 MG) MG tablet Take 1 tablet (400 mg) by mouth 2 times daily     metoprolol succinate (TOPROL-XL) 25 MG 24 hr tablet Take 1 tablet (25 mg) by mouth daily     mycophenolate (GENERIC EQUIVALENT) 250 MG capsule Take 1 capsule (250 mg) by mouth 2 times daily     predniSONE (DELTASONE) 5 MG tablet TAKE ONE TABLET BY MOUTH EVERY DAY     sulfamethoxazole-trimethoprim (BACTRIM/SEPTRA) 400-80 MG per tablet Take 1 tablet by mouth daily     tacrolimus (GENERIC EQUIVALENT) 0.5 MG capsule Take 1 capsule (0.5 mg) by mouth 2 times daily 1.5 mg twice per day.     tacrolimus (GENERIC EQUIVALENT) 1 MG capsule Take 1 capsule (1 mg) by mouth 2 times daily 1.5 mg twice per day.     No current facility-administered medications for this visit.      There are no discontinued medications.    Physical Exam   Vital Signs: /82  Pulse 68  Resp 18  Ht 1.778 m (5' 10\")  Wt 86.1 kg (189 lb 12.8 oz)  BMI 27.23 kg/m2  GENERAL APPEARANCE: alert and no distress  HENT: mouth without ulcers or lesions  LYMPHATICS: no cervical or supraclavicular nodes  RESP: lungs clear to auscultation - no rales, rhonchi or wheezes  CV: regular rhythm, normal rate, no rub, no murmur  EDEMA: no LE edema bilaterally  ABDOMEN: soft, nondistended, nontender, bowel sounds normal  MS: extremities normal - no gross deformities noted, no evidence of inflammation in joints, no muscle tenderness  SKIN: no rash  TX KIDNEY: normal    Data   Renal -   Recent Labs   Lab Test  07/25/18   1721  07/23/18   1110  07/10/18   1113   NA  136  141  140   POTASSIUM  4.2  4.0  4.1   CHLORIDE  104  107  106   CO2  25  30  " 25   BUN  18  18  18   CR  1.39*  1.50*  1.44*   GLC  106*  91  94   COLBY  9.0  9.4  9.4     Recent Labs   Lab Test  06/12/18   1123  04/17/18   1129  03/06/18   1255   05/17/17   0806  04/26/17   1010  04/18/17   1027   MAG  1.8  2.1  2.2   < >  2.4*  2.4*  2.0   PHOS   --    --    --    --   3.1  3.0  3.2    < > = values in this interval not displayed.     Recent Labs   Lab Test  08/07/17   1700  08/07/17   1527  06/29/17   1056   PTHI  167*  109*  173*     Recent Labs   Lab Test  06/29/17   1056  01/03/17   1323  10/14/14   0708   VITDT  38  13*  35       CBC -   Recent Labs   Lab Test  07/25/18   1721  07/23/18   1110  07/10/18   1113   WBC  6.1  4.9  5.1   HGB  15.0  15.3  15.3   PLT  127*  126*  120*       LFTs -   Recent Labs   Lab Test  07/25/18   1721  06/12/18   1123  04/17/18   1129   ALKPHOS  71  77  67   BILITOTAL  0.5  0.5  0.7   ALT  22  23  20   AST  16  17  18   PROTTOTAL  7.3  7.1  7.2   ALBUMIN  4.2  4.0  4.2       Pancreas -  Recent Labs   Lab Test  05/12/16   0336   A1C  5.1     Recent Labs   Lab Test  03/06/18   1255  05/12/16   0336  05/10/16   1327  11/22/14   2220   AMYLASE   --   47  100   --    LIPASE  95  125   --   318       Iron Panel -   Recent Labs   Lab Test  07/14/16   1052  06/21/14   0726   IRON  84  101   IRONSAT  29  71*   BRIELLE  171   --        Transplant -   Recent Labs   Lab Test  01/03/17   1323  08/29/16   1745   CSPEC  Plasma, EDTA anticoagulant  Plasma, EDTA anticoagulant   CMVQNT  CMV DNA Not Detected   The ANNA AmpliPrep/ANNA TaqMan CMV Test is an FDA-approved in vitro nucleic   acid amplification test for the quantitation of cytomegalovirus DNA in human   plasma (EDTA plasma) using the ANNA AmpliPrep Instrument for automated viral   nucleic acid extraction and the ANNA TaqMan Analyzer or ANNA TaqMan for   automated Real Time amplification and detection of the viral nucleic acid   target.   Titer results are reported in International Units/mL (IU/mL using 1st WHO    International standard for Human Cytomegalovirus for Nucleic Acid Amplification   based assays. The conversion factor between CMV DNA copis/mL (as defined by the   Roche ANNA TaqMan CMV test) and International Units is the CMV DNA   concentration in IU/mL x 1.1 copies/IU = CMV DNA in copies/mL.   This assay has received FDA approval for the testing of human plasma only. The   In[facility] Laboratory at the [facility], [place], has validated the pe rformance characteristics of the Roche   CMV assay for plasma, bronchial alveolar lavage/wash and urine.    CMV DNA Not Detected   The ANNA AmpliPrep/ANNA TaqMan CMV Test is an FDA-approved in vitro nucleic   acid amplification test for the quantitation of cytomegalovirus DNA in human   plasma (EDTA plasma) using the ANNA AmpliPrep Instrument for automated viral   nucleic acid extraction and the [employer] TaqMan Analyzer or [employer] TaqMan for   automated Real Time amplification and detection of the viral nucleic acid   target.   Titer results are reported in International Units/mL (IU/mL using 1st WHO   International standard for Human Cytomegalovirus for Nucleic Acid Amplification   based assays. The conversion factor between CMV DNA copis/mL (as defined by the   Roche ANNA TaqMan CMV test) and International Units is the CMV DNA   concentration in IU/mL x 1.1 copies/IU = CMV DNA in copies/mL.   This assay has received FDA approval for the testing of human plasma only. The   [facility] Laboratory at the [facility], [place], has validated the pe rformance characteristics of the Roche   CMV assay for plasma, bronchial alveolar lavage/wash and urine.     CMVLOG  Not Calculated  Not Calculated     Recent Labs   Lab Test  01/26/18   1121  03/08/17   1215  02/09/17   1052   EBRES  EBV DNA Not Detected  EBV DNA Not Detected  4720*   EBLOG  Not Calculated  Not Calculated   The  Real-Time quantitative EBV assay was developed and its performance   characteristics determined by the Infect[facility] Diseases Diagnostic Laboratory at   the [facility] in [place], [place].  The   primers and probes are Analyte Specific Reagents (ASRs) manufactured  by   [employer].   ASRs are used in many laboratory tests necessary for standard medical care and   generally do not require U.S. Food and Drug Administration approval.  The FDA   has determined that such clearance or approval is not necessary.  This test is   used for clinical purposes.  It should not be regarded as investigational or   research.   This laboratory is certified under the Clinical Laboratory Improvement   Amendments of 1988 (CLIA-88) as qualified to perform high complexity clinical   laboratory testing.   The quantitative range of this assay is 500-22,500,00 copies/mL (2.7-7.4 log   copies/mL).  A negative result does not rule out the presence of PCR inhibitors    in the patient specimen or EBV DNA nucleic acid in concentrations below the   level of detection of the assay.  Inhibition may also lead to underestimation   of viral quantitation.    3.7*     Recent Labs   Lab Test  07/25/18   1722  07/23/18   1110  06/26/18   1112   BKRES  5754*  9988*  03638*   BKLOG  3.8*  4.0*  4.0*       Recent Labs   Lab Test  07/23/18   1111  07/10/18   1114  06/26/18   1112   DOSTAC  7/22/18 2315  7/09/18 2315  6/25/18 2300   TACROL  4.5*  4.6*  3.9*     Recent Labs   Lab Test  05/19/16   0714   DOSMPA  1900   MPACID  <0.25*   MPAG  51.0

## 2018-08-06 NOTE — NURSING NOTE
"Chief Complaint   Patient presents with     RECHECK     Kidney tx follow up     Blood pressure 125/82, pulse 68, resp. rate 18, height 1.778 m (5' 10\"), weight 86.1 kg (189 lb 12.8 oz).    DENICE RAGLAND CMA    "

## 2018-08-07 ENCOUNTER — ONCOLOGY VISIT (OUTPATIENT)
Dept: ONCOLOGY | Facility: CLINIC | Age: 38
End: 2018-08-07
Attending: INTERNAL MEDICINE
Payer: COMMERCIAL

## 2018-08-07 VITALS
HEART RATE: 66 BPM | OXYGEN SATURATION: 99 % | DIASTOLIC BLOOD PRESSURE: 72 MMHG | RESPIRATION RATE: 14 BRPM | TEMPERATURE: 97.8 F | HEIGHT: 70 IN | SYSTOLIC BLOOD PRESSURE: 108 MMHG | WEIGHT: 193.1 LBS | BODY MASS INDEX: 27.64 KG/M2

## 2018-08-07 DIAGNOSIS — Z94.0 KIDNEY TRANSPLANTED: ICD-10-CM

## 2018-08-07 DIAGNOSIS — D47.Z1 PTLD (POST-TRANSPLANT LYMPHOPROLIFERATIVE DISORDER) (H): ICD-10-CM

## 2018-08-07 DIAGNOSIS — D47.Z1 PTLD (POST-TRANSPLANT LYMPHOPROLIFERATIVE DISORDER) (H): Primary | ICD-10-CM

## 2018-08-07 LAB
ALBUMIN SERPL-MCNC: 4.2 G/DL (ref 3.4–5)
ALP SERPL-CCNC: 70 U/L (ref 40–150)
ALT SERPL W P-5'-P-CCNC: 30 U/L (ref 0–70)
ANION GAP SERPL CALCULATED.3IONS-SCNC: 4 MMOL/L (ref 3–14)
ANION GAP SERPL CALCULATED.3IONS-SCNC: 6 MMOL/L (ref 3–14)
AST SERPL W P-5'-P-CCNC: 22 U/L (ref 0–45)
BASOPHILS # BLD AUTO: 0 10E9/L (ref 0–0.2)
BASOPHILS NFR BLD AUTO: 0.2 %
BILIRUB SERPL-MCNC: 0.8 MG/DL (ref 0.2–1.3)
BUN SERPL-MCNC: 13 MG/DL (ref 7–30)
BUN SERPL-MCNC: 14 MG/DL (ref 7–30)
CALCIUM SERPL-MCNC: 8.9 MG/DL (ref 8.5–10.1)
CALCIUM SERPL-MCNC: 9.1 MG/DL (ref 8.5–10.1)
CHLORIDE SERPL-SCNC: 106 MMOL/L (ref 94–109)
CHLORIDE SERPL-SCNC: 106 MMOL/L (ref 94–109)
CO2 SERPL-SCNC: 26 MMOL/L (ref 20–32)
CO2 SERPL-SCNC: 29 MMOL/L (ref 20–32)
CREAT SERPL-MCNC: 1.2 MG/DL (ref 0.66–1.25)
CREAT SERPL-MCNC: 1.39 MG/DL (ref 0.66–1.25)
DIFFERENTIAL METHOD BLD: ABNORMAL
EOSINOPHIL # BLD AUTO: 0 10E9/L (ref 0–0.7)
EOSINOPHIL NFR BLD AUTO: 0.5 %
ERYTHROCYTE [DISTWIDTH] IN BLOOD BY AUTOMATED COUNT: 12.8 % (ref 10–15)
ERYTHROCYTE [DISTWIDTH] IN BLOOD BY AUTOMATED COUNT: 13.6 % (ref 10–15)
GFR SERPL CREATININE-BSD FRML MDRD: 57 ML/MIN/1.7M2
GFR SERPL CREATININE-BSD FRML MDRD: 68 ML/MIN/1.7M2
GLUCOSE SERPL-MCNC: 91 MG/DL (ref 70–99)
GLUCOSE SERPL-MCNC: 91 MG/DL (ref 70–99)
HCT VFR BLD AUTO: 45.1 % (ref 40–53)
HCT VFR BLD AUTO: 46 % (ref 40–53)
HGB BLD-MCNC: 15.2 G/DL (ref 13.3–17.7)
HGB BLD-MCNC: 15.5 G/DL (ref 13.3–17.7)
IMM GRANULOCYTES # BLD: 0 10E9/L (ref 0–0.4)
IMM GRANULOCYTES NFR BLD: 0.2 %
LDH SERPL L TO P-CCNC: 151 U/L (ref 85–227)
LYMPHOCYTES # BLD AUTO: 0.6 10E9/L (ref 0.8–5.3)
LYMPHOCYTES NFR BLD AUTO: 9.6 %
MCH RBC QN AUTO: 29.9 PG (ref 26.5–33)
MCH RBC QN AUTO: 31.1 PG (ref 26.5–33)
MCHC RBC AUTO-ENTMCNC: 33 G/DL (ref 31.5–36.5)
MCHC RBC AUTO-ENTMCNC: 34.4 G/DL (ref 31.5–36.5)
MCV RBC AUTO: 91 FL (ref 78–100)
MCV RBC AUTO: 91 FL (ref 78–100)
MONOCYTES # BLD AUTO: 0.5 10E9/L (ref 0–1.3)
MONOCYTES NFR BLD AUTO: 8 %
NEUTROPHILS # BLD AUTO: 5.3 10E9/L (ref 1.6–8.3)
NEUTROPHILS NFR BLD AUTO: 81.5 %
NRBC # BLD AUTO: 0 10*3/UL
NRBC BLD AUTO-RTO: 0 /100
PLATELET # BLD AUTO: 131 10E9/L (ref 150–450)
PLATELET # BLD AUTO: 135 10E9/L (ref 150–450)
POTASSIUM SERPL-SCNC: 4 MMOL/L (ref 3.4–5.3)
POTASSIUM SERPL-SCNC: 4.1 MMOL/L (ref 3.4–5.3)
PROT SERPL-MCNC: 7.3 G/DL (ref 6.8–8.8)
RBC # BLD AUTO: 4.98 10E12/L (ref 4.4–5.9)
RBC # BLD AUTO: 5.08 10E12/L (ref 4.4–5.9)
SODIUM SERPL-SCNC: 138 MMOL/L (ref 133–144)
SODIUM SERPL-SCNC: 139 MMOL/L (ref 133–144)
WBC # BLD AUTO: 5.3 10E9/L (ref 4–11)
WBC # BLD AUTO: 6.5 10E9/L (ref 4–11)

## 2018-08-07 PROCEDURE — 85027 COMPLETE CBC AUTOMATED: CPT | Performed by: INTERNAL MEDICINE

## 2018-08-07 PROCEDURE — 25000128 H RX IP 250 OP 636: Performed by: INTERNAL MEDICINE

## 2018-08-07 PROCEDURE — G0463 HOSPITAL OUTPT CLINIC VISIT: HCPCS | Mod: ZF

## 2018-08-07 PROCEDURE — 36591 DRAW BLOOD OFF VENOUS DEVICE: CPT

## 2018-08-07 PROCEDURE — 85025 COMPLETE CBC W/AUTO DIFF WBC: CPT | Performed by: INTERNAL MEDICINE

## 2018-08-07 PROCEDURE — 87799 DETECT AGENT NOS DNA QUANT: CPT | Performed by: INTERNAL MEDICINE

## 2018-08-07 PROCEDURE — 99213 OFFICE O/P EST LOW 20 MIN: CPT | Mod: ZP | Performed by: INTERNAL MEDICINE

## 2018-08-07 PROCEDURE — 83615 LACTATE (LD) (LDH) ENZYME: CPT | Performed by: INTERNAL MEDICINE

## 2018-08-07 PROCEDURE — 80048 BASIC METABOLIC PNL TOTAL CA: CPT | Performed by: INTERNAL MEDICINE

## 2018-08-07 PROCEDURE — 80197 ASSAY OF TACROLIMUS: CPT | Performed by: INTERNAL MEDICINE

## 2018-08-07 PROCEDURE — 36415 COLL VENOUS BLD VENIPUNCTURE: CPT | Performed by: INTERNAL MEDICINE

## 2018-08-07 PROCEDURE — 80053 COMPREHEN METABOLIC PANEL: CPT | Performed by: INTERNAL MEDICINE

## 2018-08-07 RX ORDER — HEPARIN SODIUM (PORCINE) LOCK FLUSH IV SOLN 100 UNIT/ML 100 UNIT/ML
5 SOLUTION INTRAVENOUS ONCE
Status: COMPLETED | OUTPATIENT
Start: 2018-08-07 | End: 2018-08-07

## 2018-08-07 RX ADMIN — SODIUM CHLORIDE, PRESERVATIVE FREE 5 ML: 5 INJECTION INTRAVENOUS at 14:30

## 2018-08-07 ASSESSMENT — PAIN SCALES - GENERAL: PAINLEVEL: NO PAIN (0)

## 2018-08-07 NOTE — NURSING NOTE
"Oncology Rooming Note    August 7, 2018 12:43 PM   Cricket Chen is a 37 year old male who presents for:    Chief Complaint   Patient presents with     Oncology Clinic Visit     [facility] RETURN- ONC PTLD/EBV+     Initial Vitals: /72 (BP Location: Left arm, Patient Position: Chair, Cuff Size: Adult Regular)  Pulse 66  Temp 97.8  F (36.6  C) (Oral)  Resp 14  Ht 1.788 m (5' 10.39\")  Wt 87.6 kg (193 lb 1.6 oz)  SpO2 99%  BMI 27.4 kg/m2 Estimated body mass index is 27.4 kg/(m^2) as calculated from the following:    Height as of this encounter: 1.788 m (5' 10.39\").    Weight as of this encounter: 87.6 kg (193 lb 1.6 oz). Body surface area is 2.09 meters squared.  No Pain (0) Comment: Data Unavailable   No LMP for male patient.  Allergies reviewed: Yes  Medications reviewed: Yes    Medications: Medication refills not needed today.  Pharmacy name entered into [facility]:    [facility] - [place], [place] - [address] [facility] - [place], [place] - [address]    Clinical concerns: No new concerns. Yamilka was notified.    10 minutes for nursing intake (face to face time)     Fernando Doan LPN            "

## 2018-08-07 NOTE — LETTER
8/7/2018       RE: Cricket Chen  [address]  [facility] [address]     Dear Colleague,    Thank you for referring your patient, Cricket Chen, to the [facility]. Please see a copy of my visit note below.    [facility]  HEMATOLOGY AND MEDICAL ONCOLOGY    FOLLOW UP APPOINTMENT    PATIENT NAME: Cricket Chen   MRN# [id number]     Date of Visit: Aug 7, 2018    Referring Provider: Girish Narayanan MD  [address] [facility] [address]  [place] [address] YOB: 1980     Reason for visit: 36 yo man with  PTLD, treated with 4 weekly Rituxan and 4 cycles of R-CEOP presenting for a follow up.      CHIEF COMPLAINT   Oncology Clinic Visit (P RETURN- ONC PTLD/EBV+)         HISTORY OF PRESENTING ILLNESS     Mr. 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  CellCept was reduced from 1500 b.i.d. to 750 twice a day, and  tacrolimus 3 mg twice a day was decreased to 2 mg twice a day. He tolerated the RSST well and completed 4 weekly infusions of rituximab followed by R-COEP x4. He underwent end of Rx PET/CT scan and he had achieved CR1. Notably, his diagnostic PET/CT scan identified a thyroid nodule, which was consistent with papillary thyroid carcinoma based on the ultrasound-guided fine needle aspiration and he underwent thyroidectomy.       8/7/2018: Mr. Chen presents to clinic for follow up appointment. He denies any new lymphadenopathy. He denies any drenching night sweats, fevers or chills. He has good energy level and good appetite. He was hospitalized for symptomatic atrial flutter with dyspnea on 3/7/2018 and underwent successfully direct current cardioversion. He was seen in primary care clinic in 6/21/2018 for hematospermia which was thought to be secondary to  microtrauma of the testicles or prostate. The hematospermia lasted for one month and resolved without intervention. 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    He is followed by the  Nephrology and the transplant team. He is on mycophenolate 250 mg twice daily, tacrolimus 1.5 mg twice daily, and on prednisone which was recently decreased down to 2.5 mg once daily. The plan is continue the prednisone 2.5mg qdaily for two weeks and then discontinue it. His last BK  was 4804 on 8/7/2018 which was decreased from 5754 on 7/25/2018        PAST MEDICAL HISTORY     Past Medical History:   Diagnosis Date     Alcohol abuse     Last drink in Mid-April 2014     Anemia in ESRD (end-stage renal disease) (H)      Anxiety 2008     Atrial flutter (H)      Cirrhosis (H)     S/P liver transplant     Depression      History of blood transfusion      History of transposition of great vessels     atrial switch at age 8 months old     Hypertension      Liver transplant recipient (H)     2016     Papillary thyroid carcinoma (H)      Pneumonia 11-15-14     Renal transplant recipient     2016     Varices, esophageal (H)         PAST SURGICAL HISTORY     Past Surgical History:   Procedure Laterality Date     ANESTHESIA CARDIOVERSION N/A 3/7/2018    Procedure: ANESTHESIA CARDIOVERSION;  Cardioversion;  Surgeon: GENERIC ANESTHESIA PROVIDER;  Location:  OR     BENCH LIVER N/A 5/10/2016    Procedure: BENCH LIVER;  Surgeon: Ricky Deshpande MD;  Location:  OR     BIOPSY LYMPH NODE CERVICAL Right 1/26/2017    Procedure: BIOPSY LYMPH NODE CERVICAL;  Surgeon: Beka Soto MD;  Location:  OR     CARDIAC SURGERY  [birth year]     CREATE FISTULA ARTERIOVENOUS UPPER EXTREMITY Right 9/16/2014    Procedure: CREATE FISTULA ARTERIOVENOUS UPPER EXTREMITY;  Surgeon: Padmaja Eaton MD;  Location:  OR      CYSTOSCOPY, REMOVE STENT(S), COMBINED Right 2016    Procedure: COMBINED CYSTOSCOPY, REMOVE STENT(S);  Surgeon: Ricky Deshpande MD;  Location:  OR     ENT SURGERY       ESOPHAGOSCOPY, GASTROSCOPY, DUODENOSCOPY (EGD), COMBINED  2014    Procedure: COMBINED ESOPHAGOSCOPY, GASTROSCOPY, DUODENOSCOPY (EGD);  Surgeon: Guillaume Bautista MD;  Location:  GI     ESOPHAGOSCOPY, GASTROSCOPY, DUODENOSCOPY (EGD), COMBINED  14     ESOPHAGOSCOPY, GASTROSCOPY, DUODENOSCOPY (EGD), COMBINED Left 3/12/2015    Procedure: COMBINED ESOPHAGOSCOPY, GASTROSCOPY, DUODENOSCOPY (EGD);  Surgeon: Laureen Galvan MD;  Location:  GI     ESOPHAGOSCOPY, GASTROSCOPY, DUODENOSCOPY (EGD), COMBINED N/A 2016    Procedure: COMBINED ESOPHAGOSCOPY, GASTROSCOPY, DUODENOSCOPY (EGD);  Surgeon: Laureen Galvan MD;  Location:  GI     ESOPHAGOSCOPY, GASTROSCOPY, DUODENOSCOPY (EGD), COMBINED N/A 2016    Procedure: COMBINED ESOPHAGOSCOPY, GASTROSCOPY, DUODENOSCOPY (EGD);  Surgeon: Laureen Galvan MD;  Location:  GI     HC OR CATH ABLATION NON-CARDIAC ENDOVASCULAR      SVT     INSERT PORT VASCULAR ACCESS N/A 4/3/2017    Procedure: INSERT PORT VASCULAR ACCESS;  Surgeon: Rajendra Jacques PA-C;  Location: [facility] OR     LIGATE FISTULA ARTERIOVENOUS UPPER EXTREMITY Right 2017    Procedure: LIGATE FISTULA ARTERIOVENOUS UPPER EXTREMITY;  Revision of Right Arm Arteriovenous Fistula ;  Surgeon: Padmaja Eaton MD;  Location: [facility] OR     PICC INSERTION  14    PICC line placement 14; Removal 2014     THORACIC SURGERY  1980    Transposition great arteries, repaired at 8 months     THYROIDECTOMY Right 2017    Procedure: THYROIDECTOMY;  Bilateral Total Thyroidectomy ;  Surgeon: Beka Soto MD;  Location:  OR     TRANSPLANT KIDNEY RECIPIENT  DONOR  5/10/2016    Procedure: TRANSPLANT KIDNEY RECIPIENT  DONOR;  Surgeon: Ricky Deshpande MD;  Location:   OR     TRANSPLANT LIVER RECIPIENT  DONOR N/A 5/10/2016    Procedure: TRANSPLANT LIVER RECIPIENT  DONOR;  Surgeon: Ricky Deshpande MD;  Location: [facility] OR         CURRENT OUTPATIENT MEDICATIONS     Current Outpatient Prescriptions   Medication Sig     amLODIPine (NORVASC) 10 MG tablet TAKE ONE TABLET BY MOUTH EVERY DAY     apixaban ANTICOAGULANT (ELIQUIS) 5 MG tablet Take 1 tablet (5 mg) by mouth 2 times daily     ketoconazole (NIZORAL) 2 % cream Twice daily to areas of rash on face     ketoconazole (NIZORAL) 2 % shampoo Use as a foaming face wash in shower daily     levothyroxine (SYNTHROID/LEVOTHROID) 137 MCG tablet Take 1 tablet (137 mcg) by mouth daily     lisinopril (PRINIVIL/ZESTRIL) 5 MG tablet TAKE ONE TABLET BY MOUTH EVERY DAY     magnesium oxide (MAG-OX) 400 (241.3 MG) MG tablet Take 1 tablet (400 mg) by mouth 2 times daily     metoprolol succinate (TOPROL-XL) 25 MG 24 hr tablet Take 1 tablet (25 mg) by mouth daily     mycophenolate (GENERIC EQUIVALENT) 250 MG capsule Take 1 capsule (250 mg) by mouth 2 times daily     predniSONE (DELTASONE) 5 MG tablet Take 2.5 mg once daily for 14 days, then stop     sulfamethoxazole-trimethoprim (BACTRIM/SEPTRA) 400-80 MG per tablet Take 1 tablet by mouth daily     tacrolimus (GENERIC EQUIVALENT) 0.5 MG capsule Take 1 capsule (0.5 mg) by mouth 2 times daily 1.5 mg twice per day.     tacrolimus (GENERIC EQUIVALENT) 1 MG capsule Take 1 capsule (1 mg) by mouth 2 times daily 1.5 mg twice per day.     No current facility-administered medications for this visit.         ALLERGIES     Allergies   Allergen Reactions     Hydromorphone Itching     .     SOCIAL HISTORY     Social History     Social History     Marital status: Single     Spouse name: N/A     Number of children: 0     Years of education: N/A     Occupational History      Unemployed     Social History Main Topics     Smoking status: Former Smoker     Packs/day: 0.33     Years: 3.00     Types:  Cigarettes     Start date: 8/20/1997     Quit date: 9/21/2000     Smokeless tobacco: Former User     Alcohol use No      Comment: ~10 drinks per day for ten years, quit in April of 2014     Drug use: No     Sexual activity: Not Currently     Partners: Female     Birth control/ protection: None     Other Topics Concern     Not on file     Social History Narrative    ? Blood transfusion as baby during surgery,    Professional performed tattoos     No Illicit IV or intranasal drug use.           FAMILY HISTORY     Family History   Problem Relation Age of Onset     Hypertension Brother      Hypertension Sister      Arthritis Father      Hypertension Father      Hypertension Mother      Hypertension Sister      Alcohol/Drug No family hx of      GASTROINTESTINAL DISEASE No family hx of      no fam hx of liver disease or liver cancer          REVIEW OF SYSTEMS   Pertinent positives have been included in HPI;  Review Of Systems  General: no fever, chills or night sweats. No fatigue  Skin: negative for rash  Eyes: negative for visual blurring  Ears/Nose/Throat: negative for hearing loss  Respiratory: No shortness of breath, dyspnea on exertion, cough, or hemoptysis  Cardiovascular: negative for palpitations and tachycardia  Gastrointestinal: negative for nausea, vomiting and abdominal pain  Genitourinary: negative for nocturia and dysuria  Musculoskeletal: negative for muscular pain or bone pain  Neurologic: negative for migraine headaches  Psychiatric: negative for anxiety  Hematologic/Lymphatic/Immunologic: as per hpi  Endocrine: as per hpi       PHYSICAL EXAM   B/P: 108/72, T: 97.8, P: 66, R: 14  Wt Readings from Last 3 Encounters:   08/07/18 87.6 kg (193 lb 1.6 oz)   08/06/18 86.1 kg (189 lb 12.8 oz)   07/24/18 87.4 kg (192 lb 11.2 oz)     General appearance: pleasant man not in acute distress  Eyes, Ears, Nose, Throat & Mouth:pupils equal and reactive to light and accommodation, extraocular movements intact, no icterus,  injection or pallor. Oropharynx is clear.  Neck: supple, see hem  Respiratory: clear to auscultation bilaterally  Cardiovascular: regular, no murmurs, rubs, or gallops  Gastrointenstinal: soft, non-tender, non-distended, normal bowel sounds, no hepatosplenomegaly  Extremities: warm, well perfused, no edema  Neurologic: Alert and oriented to person, place and time, Cranial nerves 2-12 intact, intact sensation to light touch, muscle strength 5/5 in 4 extremities, reflexes +2   Skin: no rash  Hematologic/Lymph: no cervical or inguinal lymphadenopathy, 1x1cm mobile right axillary lymph node     LABORATORY AND IMAGING STUDIES     Recent Labs   Lab Test  08/07/18   1435  08/07/18   1116  07/25/18   1721   04/17/18   1129   WBC  6.5  5.3  6.1   < >  4.0   RBC  5.08  4.98  4.94   < >  4.94   HGB  15.2  15.5  15.0   < >  14.9   HCT  46.0  45.1  45.2   < >  43.5   MCV  91  91  92   < >  88   MCH  29.9  31.1  30.4   < >  30.2   MCHC  33.0  34.4  33.2   < >  34.3   RDW  12.8  13.6  12.8   < >  13.7   PLT  135*  131*  127*   < >  130*   NEUTROPHIL  81.5   --   85.3   --   78.3   ANEU  5.3   --   5.2   --   3.2   ALYM  0.6*   --   0.5*   --   0.5*   JANE  0.5   --   0.4   --   0.3   AEOS  0.0   --   0.0   --   0.1    < > = values in this interval not displayed.     Recent Labs   Lab Test  08/07/18   1435  08/07/18   1116  07/25/18   1721   NA  138  139  136   POTASSIUM  4.1  4.0  4.2   CHLORIDE  106  106  104   CO2  26  29  25   ANIONGAP  6  4  7   GLC  91  91  106*   BUN  13  14  18   CR  1.20  1.39*  1.39*   COLBY  9.1  8.9  9.0     Recent Labs   Lab Test  08/07/18   1435  07/25/18   1721  06/12/18   1123   BILITOTAL  0.8  0.5  0.5   ALKPHOS  70  71  77   AST  22  16  17   ALT  30  22  23     Recent Labs   Lab Test  08/07/18   1435  07/25/18   1721  04/17/18   1129   LDH  151  166  151     Recent Labs   Lab Test  01/02/18   1225  02/01/17   1104   IGG  743  776   IGM   --   82   IGA   --   150     Recent Labs   Lab Test   02/01/17   1104   ELPM  0.0   ELPINT  Essentially normal electrophoretic pattern.  No monoclonal protein seen.   Pathologic significance requires clinical correlation.  PATRICIA Moody M.D.,   Ph.D., Pathologist ().         ECOG PS: 0   ASSESSMENT AND RECOMMENDATIONS       Mr. Chen is a 38yo man with past medical history of combined liver/kidney transplant in 05/2016 complicated by EBV+ monomorphic PTLD stage III on 02/2017 with non-GCB DLBCL s/p  RSST protocol with 4 weekly infusions of rituximab followed by R-COEP x4.  He is here for his follow-up appointment. He is doing well. Imaging review consistent with CR on 1/30/2018. He is on immunosuppression with tacrolimus, prednisone and mycophenolate. He is following with transplant team.  We do recommend to taper down mycophenolate if that is feasible from a transplant standpoint.  If not, we would advise to keep him on the lowest effective dose to mitigate the risk for PTLD recurrence. He will come back in clinic in 6 months with labs and surveillance CT scan.    Carter Prather MD   of Medicine  Division of Hematology, Oncology and Transplantation  [facility]

## 2018-08-07 NOTE — NURSING NOTE
"Chief Complaint   Patient presents with     Port Draw     port accessed and labs drawn by rn.      Port accessed with 20g 3/4\" gripper needle, labs drawn by RN.  Port flushed with saline and heparin.  Port de-accessed.    Danyelle Magana RN    "

## 2018-08-07 NOTE — MR AVS SNAPSHOT
After Visit Summary   8/7/2018    Cricket Chen    MRN: [id number]           Patient Information     Date Of Birth          1980        Visit Information        Provider Department      8/7/2018 12:45 PM Carter Prather MD [facility]        Today's Diagnoses     PTLD (post-transplant lymphoproliferative disorder) (H)    -  1       Follow-ups after your visit        Follow-up notes from your care team     Return in about 6 months (around 2/7/2019) for Physical Exam.      Your next 10 appointments already scheduled     Aug 10, 2018  1:50 PM CDT   (Arrive by 1:35 PM)   RETURN ENDOCRINE with Zuleyma Gray MD   [facility] ([facility])    [address] [place] [address]  [address] [facility] [address]   [phone]            Aug 13, 2018  7:30 AM CDT   (Arrive by 7:15 AM)   PAC EVALUATION with DARYN Chakraborty CNP   [facility] ([facility])    [address] [place] [address]   [phone]            Aug 13, 2018  8:30 AM CDT   (Arrive by 8:15 AM)   PAC RN ASSESSMENT with  Pac Rn   [facility] [place] ([facility])    [address] [place] [address]   [phone]            Aug 13, 2018  9:00 AM CDT   (Arrive by 8:45 AM)   PAC Anesthesia Consult with  Pac Anesthesiologist   [facility] ([facility])    [address] [place] [id number]   [phone]            Aug 17, 2018   Procedure with John Payton MD   [facility] [place] (--)    [address] [facility] [address] [phone]            Aug 21, 2018 11:15 AM CDT   LAB with [facility]   [place] ([facility] [place])    [address] [facility]  [place] [address] [phone]           Please do not eat  10-12 hours before your appointment if you are coming in fasting for labs on lipids, cholesterol, or glucose (sugar). This does not apply to pregnant women. Water, hot tea and black coffee (with nothing added) are okay. Do not drink other fluids, diet soda or chew gum.            Oct 18, 2018 12:45 PM CDT   (Arrive by 12:30 PM)   Return Visit with Yuval Dobbins MD   [facility] ([facility])    [address] [place] [address]  [address] [facility] [id number] [phone]            Jan 08, 2019 12:00 PM CST   CT CHEST ABDOMEN PELVIS W/O & W CONTRAST with UCCT2   [facility] ([facility])    [address]  [address] [facility] [id number] [phone]           Please bring any scans or X-rays taken at other hospitals, if similar tests were done. Also bring a list of your medicines, including vitamins, minerals and over-the-counter drugs. It is safest to leave personal items at home.  Be sure to tell your doctor:   If you have any allergies.   If there s any chance you are pregnant.   If you are breastfeeding.  How to prepare:   Do not eat or drink for 2 hours before your exam. If you need to take medicine, you may take it with small sips of water. (We may ask you to take liquid medicine as well.)   Please wear loose clothing, such as a sweat suit or jogging clothes. Avoid snaps, zippers and other metal. We may ask you to undress and put on a hospital gown.  Please arrive 30 minutes early for your CT. Once in the department you might be asked to drink water 15-20 minutes prior to your exam.  If indicated you may be asked to drink an oral contrast in advance of your CT.  If this is the case, the imaging team will let you know or be in contact with you prior to your appointment  Patients over 70 or patients with diabetes or kidney problems:   If you haven t had a blood test (creatinine test) within the last 30 days, the  Cardiologist/Radiologist may require you to get this test prior to your exam.  If you have diabetes:   Continue to take your metformin medication on the day of your exam  If you have any questions, please call the Imaging Department where you will have your exam.            Jan 18, 2019 11:00 AM CST   (Arrive by 10:45 AM)   Return Liver Transplant with Laureen Galvan MD   [facility] ([facility])    [address]  [address]  [facility] [address] [phone]            Feb 12, 2019  2:20 PM CST   (Arrive by 1:50 PM)   Return Kidney Transplant with Uc Kidney/Pancreas Recipient   [facility] ([facility])    [address]  [address]  [facility] [address] [phone]              Future tests that were ordered for you today     Open Future Orders        Priority Expected Expires Ordered    CT Chest abdomen pelvis w & w/o contrast Routine 1/21/2019 1/31/2019 8/7/2018            Who to contact     If you have questions or need follow up information about today's clinic visit or your schedule please contact [facility] directly at [phone].  Normal or non-critical lab and imaging results will be communicated to you by MyChart, letter or phone within 4 business days after the clinic has received the results. If you do not hear from us within 7 days, please contact the clinic through [employer] or phone. If you have a critical or abnormal lab result, we will notify you by phone as soon as possible.  Submit refill requests through [employer] or call your pharmacy and they will forward the refill request to us. Please allow 3 business days for your refill to be completed.          Additional Information About Your Visit        [employer] Information     [employer] gives you secure access to your electronic health record. If you see a primary care provider, you can also send messages to your care team and make  "appointments. If you have questions, please call your primary care clinic.  If you do not have a primary care provider, please call [phone] and they will assist you.        Care EveryWhere ID     This is your Care EveryWhere ID. This could be used by other organizations to access your [place] medical records  [id number]        Your Vitals Were     Pulse Temperature Respirations Height Pulse Oximetry BMI (Body Mass Index)    66 97.8  F (36.6  C) (Oral) 14 1.788 m (5' 10.39\") 99% 27.4 kg/m2       Blood Pressure from Last 3 Encounters:   08/07/18 108/72   08/06/18 125/82   08/02/18 126/73    Weight from Last 3 Encounters:   08/07/18 87.6 kg (193 lb 1.6 oz)   08/06/18 86.1 kg (189 lb 12.8 oz)   07/24/18 87.4 kg (192 lb 11.2 oz)               Primary Care Provider    [facility]       No address on file        Equal Access to Services     CLAY VIDES : Hadii aad ku hadasho Soomaali, waaxda luqadaha, qaybta kaalmada adeegyada, robina white . So [facility] [phone].    ATENCIÓN: Si habla español, tiene a laura disposición servicios gratuitos de asistencia lingüística. Llame al [phone].    We comply with applicable federal civil rights laws and [place] laws. We do not discriminate on the basis of race, color, national origin, age, disability, sex, sexual orientation, or gender identity.            Thank you!     Thank you for choosing [facility]  for your care. Our goal is always to provide you with excellent care. Hearing back from our patients is one way we can continue to improve our services. Please take a few minutes to complete the written survey that you may receive in the mail after your visit with us. Thank you!             Your Updated Medication List - Protect others around you: Learn how to safely use, store and throw away your medicines at www.disposemymeds.org.          This list is accurate as of 8/7/18 11:59 PM.  Always use " your most recent med list.                   Brand Name Dispense Instructions for use Diagnosis    amLODIPine 10 MG tablet    NORVASC    90 tablet    TAKE ONE TABLET BY MOUTH EVERY DAY    HTN (hypertension)       apixaban ANTICOAGULANT 5 MG tablet    ELIQUIS    180 tablet    Take 1 tablet (5 mg) by mouth 2 times daily    Atrial flutter, unspecified type (H)       * ketoconazole 2 % cream    NIZORAL    60 g    Twice daily to areas of rash on face    Dermatitis, seborrheic       * ketoconazole 2 % shampoo    NIZORAL    120 mL    Use as a foaming face wash in shower daily    Dermatitis, seborrheic       levothyroxine 137 MCG tablet    SYNTHROID/LEVOTHROID    90 tablet    Take 1 tablet (137 mcg) by mouth daily    Papillary thyroid carcinoma (H), Postoperative hypothyroidism       lisinopril 5 MG tablet    PRINIVIL/ZESTRIL    90 tablet    TAKE ONE TABLET BY MOUTH EVERY DAY    HTN (hypertension)       magnesium oxide 400 (241.3 Mg) MG tablet    MAG-OX    120 tablet    Take 1 tablet (400 mg) by mouth 2 times daily    Hypomagnesemia       metoprolol succinate 25 MG 24 hr tablet    TOPROL-XL    90 tablet    Take 1 tablet (25 mg) by mouth daily    Atrial flutter, unspecified type (H)       mycophenolate 250 MG capsule    GENERIC EQUIVALENT    60 capsule    Take 1 capsule (250 mg) by mouth 2 times daily    Kidney replaced by transplant       predniSONE 5 MG tablet    DELTASONE    90 tablet    Take 2.5 mg once daily for 14 days, then stop    Liver replaced by transplant (H), S/P kidney transplant       sulfamethoxazole-trimethoprim 400-80 MG per tablet    BACTRIM/SEPTRA    30 tablet    Take 1 tablet by mouth daily    Liver replaced by transplant (H), S/P kidney transplant       * tacrolimus 1 MG capsule    GENERIC EQUIVALENT    60 capsule    Take 1 capsule (1 mg) by mouth 2 times daily 1.5 mg twice per day.    Liver replaced by transplant (H), S/P kidney transplant       * tacrolimus 0.5 MG capsule    GENERIC EQUIVALENT    60  capsule    Take 1 capsule (0.5 mg) by mouth 2 times daily 1.5 mg twice per day.    S/P kidney transplant       * Notice:  This list has 4 medication(s) that are the same as other medications prescribed for you. Read the directions carefully, and ask your doctor or other care provider to review them with you.

## 2018-08-08 LAB
BKV DNA # SPEC NAA+PROBE: 4804 COPIES/ML
BKV DNA SPEC NAA+PROBE-LOG#: 3.7 LOG COPIES/ML
SPECIMEN SOURCE: ABNORMAL
TACROLIMUS BLD-MCNC: 4.1 UG/L (ref 5–15)
TME LAST DOSE: ABNORMAL H

## 2018-08-09 DIAGNOSIS — D47.Z1 POST-TRANSPLANT LYMPHOPROLIFERATIVE DISORDER (H): Primary | ICD-10-CM

## 2018-08-09 NOTE — PROGRESS NOTES
[facility] PHYSICIANS  HEMATOLOGY AND MEDICAL ONCOLOGY    FOLLOW UP APPOINTMENT    PATIENT NAME: Cricket Chen   MRN# [id number]     Date of Visit: Aug 7, 2018    Referring Provider: Girish Narayanan MD  [address] [address] YOB: 1980     Reason for visit: 38 yo man with  PTLD, treated with 4 weekly Rituxan and 4 cycles of R-CEOP presenting for a follow up.      CHIEF COMPLAINT   Oncology Clinic Visit (P RETURN- ONC PTLD/EBV+)         HISTORY OF PRESENTING ILLNESS     Mr. 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  CellCept was reduced from 1500 b.i.d. to 750 twice a day, and  tacrolimus 3 mg twice a day was decreased to 2 mg twice a day. He tolerated the RSST well and completed 4 weekly infusions of rituximab followed by R-COEP x4. He underwent end of Rx PET/CT scan and he had achieved CR1. Notably, his diagnostic PET/CT scan identified a thyroid nodule, which was consistent with papillary thyroid carcinoma based on the ultrasound-guided fine needle aspiration and he underwent thyroidectomy.       8/7/2018: Mr. Chen presents to clinic for follow up appointment. He denies any new lymphadenopathy. He denies any drenching night sweats, fevers or chills. He has good energy level and good appetite. He was hospitalized for symptomatic atrial flutter with dyspnea on 3/7/2018 and underwent successfully direct current cardioversion. He was seen in primary care clinic in 6/21/2018 for hematospermia which was thought to be secondary to microtrauma of the testicles or prostate. The hematospermia lasted for one month and resolved without intervention. 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    He is followed by the  Nephrology and the transplant team. He is on mycophenolate 250 mg twice daily, tacrolimus 1.5 mg twice daily, and on prednisone which was recently decreased down to 2.5 mg once daily. The plan is continue the prednisone 2.5mg qdaily for two weeks and then discontinue it. His last BK  was 4804 on 8/7/2018 which was decreased from 5754 on 7/25/2018        PAST MEDICAL HISTORY     Past Medical History:   Diagnosis Date     Alcohol abuse     Last drink in Mid-April 2014     Anemia in ESRD (end-stage renal disease) (H)      Anxiety 2008     Atrial flutter (H)      Cirrhosis (H)     S/P liver transplant     Depression      History of blood transfusion      History of transposition of great vessels     atrial switch at age 8 months old     Hypertension      Liver transplant recipient (H)     2016     Papillary thyroid carcinoma (H)      Pneumonia 11-15-14     Renal transplant recipient     2016     Varices, esophageal (H)         PAST SURGICAL HISTORY     Past Surgical History:   Procedure Laterality Date     ANESTHESIA CARDIOVERSION N/A 3/7/2018    Procedure: ANESTHESIA CARDIOVERSION;  Cardioversion;  Surgeon: GENERIC ANESTHESIA PROVIDER;  [facility]     BENCH LIVER N/A 5/10/2016    Procedure: BENCH LIVER;  Surgeon: Ricky Deshpande MD;  [facility]     BIOPSY LYMPH NODE CERVICAL Right 1/26/2017    Procedure: BIOPSY LYMPH NODE CERVICAL;  Surgeon: Beka Soto MD;  [facility]     CARDIAC SURGERY  [birth year]     CREATE FISTULA ARTERIOVENOUS UPPER EXTREMITY Right 9/16/2014    Procedure: CREATE FISTULA ARTERIOVENOUS UPPER EXTREMITY;  Surgeon: Padmaja Eaton MD;  [facility]     CYSTOSCOPY, REMOVE STENT(S), COMBINED Right 6/22/2016    Procedure: COMBINED CYSTOSCOPY, REMOVE STENT(S);  Surgeon: Ricky Deshpande MD;  [facility]     ENT SURGERY       ESOPHAGOSCOPY, GASTROSCOPY, DUODENOSCOPY (EGD), COMBINED  5/30/2014     Procedure: COMBINED ESOPHAGOSCOPY, GASTROSCOPY, DUODENOSCOPY (EGD);  Surgeon: Guillaume Bautista MD;  [facility] GI     ESOPHAGOSCOPY, GASTROSCOPY, DUODENOSCOPY (EGD), COMBINED  14     ESOPHAGOSCOPY, GASTROSCOPY, DUODENOSCOPY (EGD), COMBINED Left 3/12/2015    Procedure: COMBINED ESOPHAGOSCOPY, GASTROSCOPY, DUODENOSCOPY (EGD);  Surgeon: Laureen Galvan MD;  [facility]     ESOPHAGOSCOPY, GASTROSCOPY, DUODENOSCOPY (EGD), COMBINED N/A 2016    Procedure: COMBINED ESOPHAGOSCOPY, GASTROSCOPY, DUODENOSCOPY (EGD);  Surgeon: Laureen Galvan MD;  [facility]     ESOPHAGOSCOPY, GASTROSCOPY, DUODENOSCOPY (EGD), COMBINED N/A 2016    Procedure: COMBINED ESOPHAGOSCOPY, GASTROSCOPY, DUODENOSCOPY (EGD);  Surgeon: Laureen Galvan MD;  [facility] GI     HC OR CATH ABLATION NON-CARDIAC ENDOVASCULAR      SVT     INSERT PORT VASCULAR ACCESS N/A 4/3/2017    Procedure: INSERT PORT VASCULAR ACCESS;  Surgeon: Rajendra Jacques PA-C;  [facility]     LIGATE FISTULA ARTERIOVENOUS UPPER EXTREMITY Right 2017    Procedure: LIGATE FISTULA ARTERIOVENOUS UPPER EXTREMITY;  Revision of Right Arm Arteriovenous Fistula ;  Surgeon: Padmaja Eaton MD;  [facility]     PICC INSERTION  14    PICC line placement 14; Removal 2014     THORACIC SURGERY  1980    Transposition great arteries, repaired at 8 months     THYROIDECTOMY Right 2017    Procedure: THYROIDECTOMY;  Bilateral Total Thyroidectomy ;  Surgeon: Beka Soto MD;  [facility]     TRANSPLANT KIDNEY RECIPIENT  DONOR  5/10/2016    Procedure: TRANSPLANT KIDNEY RECIPIENT  DONOR;  Surgeon: Ricky Deshpande MD;  [facility]     TRANSPLANT LIVER RECIPIENT  DONOR N/A 5/10/2016    Procedure: TRANSPLANT LIVER RECIPIENT  DONOR;  Surgeon: Ricky Deshpande MD;  [facility]         CURRENT OUTPATIENT MEDICATIONS     Current Outpatient Prescriptions    Medication Sig     amLODIPine (NORVASC) 10 MG tablet TAKE ONE TABLET BY MOUTH EVERY DAY     apixaban ANTICOAGULANT (ELIQUIS) 5 MG tablet Take 1 tablet (5 mg) by mouth 2 times daily     ketoconazole (NIZORAL) 2 % cream Twice daily to areas of rash on face     ketoconazole (NIZORAL) 2 % shampoo Use as a foaming face wash in shower daily     levothyroxine (SYNTHROID/LEVOTHROID) 137 MCG tablet Take 1 tablet (137 mcg) by mouth daily     lisinopril (PRINIVIL/ZESTRIL) 5 MG tablet TAKE ONE TABLET BY MOUTH EVERY DAY     magnesium oxide (MAG-OX) 400 (241.3 MG) MG tablet Take 1 tablet (400 mg) by mouth 2 times daily     metoprolol succinate (TOPROL-XL) 25 MG 24 hr tablet Take 1 tablet (25 mg) by mouth daily     mycophenolate (GENERIC EQUIVALENT) 250 MG capsule Take 1 capsule (250 mg) by mouth 2 times daily     predniSONE (DELTASONE) 5 MG tablet Take 2.5 mg once daily for 14 days, then stop     sulfamethoxazole-trimethoprim (BACTRIM/SEPTRA) 400-80 MG per tablet Take 1 tablet by mouth daily     tacrolimus (GENERIC EQUIVALENT) 0.5 MG capsule Take 1 capsule (0.5 mg) by mouth 2 times daily 1.5 mg twice per day.     tacrolimus (GENERIC EQUIVALENT) 1 MG capsule Take 1 capsule (1 mg) by mouth 2 times daily 1.5 mg twice per day.     No current facility-administered medications for this visit.         ALLERGIES     Allergies   Allergen Reactions     Hydromorphone Itching     .     SOCIAL HISTORY     Social History     Social History     Marital status: Single     Spouse name: N/A     Number of children: 0     Years of education: N/A     Occupational History      Unemployed     Social History Main Topics     Smoking status: Former Smoker     Packs/day: 0.33     Years: 3.00     Types: Cigarettes     Start date: 8/20/1997     Quit date: 9/21/2000     Smokeless tobacco: Former User     Alcohol use No      Comment: ~10 drinks per day for ten years, quit in April of 2014     Drug use: No     Sexual activity: Not Currently     Partners:  Female     Birth control/ protection: None     Other Topics Concern     Not on file     Social History Narrative    ? Blood transfusion as baby during surgery,    Professional performed tattoos     No Illicit IV or intranasal drug use.           FAMILY HISTORY     Family History   Problem Relation Age of Onset     Hypertension Brother      Hypertension Sister      Arthritis Father      Hypertension Father      Hypertension Mother      Hypertension Sister      Alcohol/Drug No family hx of      GASTROINTESTINAL DISEASE No family hx of      no fam hx of liver disease or liver cancer          REVIEW OF SYSTEMS   Pertinent positives have been included in HPI;  Review Of Systems  General: no fever, chills or night sweats. No fatigue  Skin: negative for rash  Eyes: negative for visual blurring  Ears/Nose/Throat: negative for hearing loss  Respiratory: No shortness of breath, dyspnea on exertion, cough, or hemoptysis  Cardiovascular: negative for palpitations and tachycardia  Gastrointestinal: negative for nausea, vomiting and abdominal pain  Genitourinary: negative for nocturia and dysuria  Musculoskeletal: negative for muscular pain or bone pain  Neurologic: negative for migraine headaches  Psychiatric: negative for anxiety  Hematologic/Lymphatic/Immunologic: as per hpi  Endocrine: as per hpi       PHYSICAL EXAM   B/P: 108/72, T: 97.8, P: 66, R: 14  Wt Readings from Last 3 Encounters:   08/07/18 87.6 kg (193 lb 1.6 oz)   08/06/18 86.1 kg (189 lb 12.8 oz)   07/24/18 87.4 kg (192 lb 11.2 oz)     General appearance: pleasant man not in acute distress  Eyes, Ears, Nose, Throat & Mouth:pupils equal and reactive to light and accommodation, extraocular movements intact, no icterus, injection or pallor. Oropharynx is clear.  Neck: supple, see hem  Respiratory: clear to auscultation bilaterally  Cardiovascular: regular, no murmurs, rubs, or gallops  Gastrointenstinal: soft, non-tender, non-distended, normal bowel sounds, no  hepatosplenomegaly  Extremities: warm, well perfused, no edema  Neurologic: Alert and oriented to person, place and time, Cranial nerves 2-12 intact, intact sensation to light touch, muscle strength 5/5 in 4 extremities, reflexes +2   Skin: no rash  Hematologic/Lymph: no cervical or inguinal lymphadenopathy, 1x1cm mobile right axillary lymph node     LABORATORY AND IMAGING STUDIES     Recent Labs   Lab Test  08/07/18   1435  08/07/18   1116  07/25/18   1721   04/17/18   1129   WBC  6.5  5.3  6.1   < >  4.0   RBC  5.08  4.98  4.94   < >  4.94   HGB  15.2  15.5  15.0   < >  14.9   HCT  46.0  45.1  45.2   < >  43.5   MCV  91  91  92   < >  88   MCH  29.9  31.1  30.4   < >  30.2   MCHC  33.0  34.4  33.2   < >  34.3   RDW  12.8  13.6  12.8   < >  13.7   PLT  135*  131*  127*   < >  130*   NEUTROPHIL  81.5   --   85.3   --   78.3   ANEU  5.3   --   5.2   --   3.2   ALYM  0.6*   --   0.5*   --   0.5*   JANE  0.5   --   0.4   --   0.3   AEOS  0.0   --   0.0   --   0.1    < > = values in this interval not displayed.     Recent Labs   Lab Test  08/07/18   1435  08/07/18   1116  07/25/18   1721   NA  138  139  136   POTASSIUM  4.1  4.0  4.2   CHLORIDE  106  106  104   CO2  26  29  25   ANIONGAP  6  4  7   GLC  91  91  106*   BUN  13  14  18   CR  1.20  1.39*  1.39*   COLBY  9.1  8.9  9.0     Recent Labs   Lab Test  08/07/18   1435  07/25/18   1721  06/12/18   1123   BILITOTAL  0.8  0.5  0.5   ALKPHOS  70  71  77   AST  22  16  17   ALT  30  22  23     Recent Labs   Lab Test  08/07/18   1435  07/25/18   1721  04/17/18   1129   LDH  151  166  151     Recent Labs   Lab Test  01/02/18   1225  02/01/17   1104   IGG  743  776   IGM   --   82   IGA   --   150     Recent Labs   Lab Test  02/01/17   1104   ELPM  0.0   ELPINT  Essentially normal electrophoretic pattern.  No monoclonal protein seen.   Pathologic significance requires clinical correlation.  PATRICIA Moody M.D.,   Ph.D., Pathologist ().         ECOG PS: 0    ASSESSMENT AND RECOMMENDATIONS       Mr. Chen is a 38yo man with past medical history of combined liver/kidney transplant in 05/2016 complicated by EBV+ monomorphic PTLD stage III on 02/2017 with non-GCB DLBCL s/p  RSST protocol with 4 weekly infusions of rituximab followed by R-COEP x4.  He is here for his follow-up appointment. He is doing well. Imaging review consistent with CR on 1/30/2018. He is on immunosuppression with tacrolimus, prednisone and mycophenolate. He is following with transplant team.  We do recommend to taper down mycophenolate if that is feasible from a transplant standpoint.  If not, we would advise to keep him on the lowest effective dose to mitigate the risk for PTLD recurrence. He will come back in clinic in 6 months with labs and surveillance CT scan.    Carter Prather MD   of Medicine  Division of Hematology, Oncology and Transplantation  [facility]

## 2018-08-10 ENCOUNTER — RADIANT APPOINTMENT (OUTPATIENT)
Dept: ULTRASOUND IMAGING | Facility: CLINIC | Age: 38
End: 2018-08-10
Attending: INTERNAL MEDICINE
Payer: COMMERCIAL

## 2018-08-10 ENCOUNTER — OFFICE VISIT (OUTPATIENT)
Dept: ENDOCRINOLOGY | Facility: CLINIC | Age: 38
End: 2018-08-10
Payer: COMMERCIAL

## 2018-08-10 VITALS
SYSTOLIC BLOOD PRESSURE: 130 MMHG | WEIGHT: 191.4 LBS | BODY MASS INDEX: 27.4 KG/M2 | HEIGHT: 70 IN | HEART RATE: 65 BPM | DIASTOLIC BLOOD PRESSURE: 85 MMHG

## 2018-08-10 DIAGNOSIS — D47.Z1 PTLD (POST-TRANSPLANT LYMPHOPROLIFERATIVE DISORDER) (H): ICD-10-CM

## 2018-08-10 DIAGNOSIS — C73 PAPILLARY THYROID CARCINOMA (H): ICD-10-CM

## 2018-08-10 DIAGNOSIS — C73 PAPILLARY THYROID CARCINOMA (H): Primary | ICD-10-CM

## 2018-08-10 LAB
ALBUMIN SERPL-MCNC: 4.6 G/DL (ref 3.4–5)
ALP SERPL-CCNC: 79 U/L (ref 40–150)
ALT SERPL W P-5'-P-CCNC: 27 U/L (ref 0–70)
ANION GAP SERPL CALCULATED.3IONS-SCNC: 8 MMOL/L (ref 3–14)
AST SERPL W P-5'-P-CCNC: 18 U/L (ref 0–45)
BASOPHILS # BLD AUTO: 0 10E9/L (ref 0–0.2)
BASOPHILS NFR BLD AUTO: 0.3 %
BILIRUB SERPL-MCNC: 0.6 MG/DL (ref 0.2–1.3)
BUN SERPL-MCNC: 21 MG/DL (ref 7–30)
CALCIUM SERPL-MCNC: 9.4 MG/DL (ref 8.5–10.1)
CHLORIDE SERPL-SCNC: 104 MMOL/L (ref 94–109)
CO2 SERPL-SCNC: 25 MMOL/L (ref 20–32)
CREAT SERPL-MCNC: 1.46 MG/DL (ref 0.66–1.25)
DIFFERENTIAL METHOD BLD: ABNORMAL
EOSINOPHIL # BLD AUTO: 0 10E9/L (ref 0–0.7)
EOSINOPHIL NFR BLD AUTO: 0.3 %
ERYTHROCYTE [DISTWIDTH] IN BLOOD BY AUTOMATED COUNT: 12.7 % (ref 10–15)
GFR SERPL CREATININE-BSD FRML MDRD: 54 ML/MIN/1.7M2
GLUCOSE SERPL-MCNC: 88 MG/DL (ref 70–99)
HCT VFR BLD AUTO: 48.4 % (ref 40–53)
HGB BLD-MCNC: 16.2 G/DL (ref 13.3–17.7)
IMM GRANULOCYTES # BLD: 0 10E9/L (ref 0–0.4)
IMM GRANULOCYTES NFR BLD: 0.2 %
LDH SERPL L TO P-CCNC: 154 U/L (ref 85–227)
LYMPHOCYTES # BLD AUTO: 0.5 10E9/L (ref 0.8–5.3)
LYMPHOCYTES NFR BLD AUTO: 8.1 %
MCH RBC QN AUTO: 30.2 PG (ref 26.5–33)
MCHC RBC AUTO-ENTMCNC: 33.5 G/DL (ref 31.5–36.5)
MCV RBC AUTO: 90 FL (ref 78–100)
MONOCYTES # BLD AUTO: 0.4 10E9/L (ref 0–1.3)
MONOCYTES NFR BLD AUTO: 6 %
NEUTROPHILS # BLD AUTO: 5.3 10E9/L (ref 1.6–8.3)
NEUTROPHILS NFR BLD AUTO: 85.1 %
NRBC # BLD AUTO: 0 10*3/UL
NRBC BLD AUTO-RTO: 0 /100
PLATELET # BLD AUTO: 136 10E9/L (ref 150–450)
POTASSIUM SERPL-SCNC: 4.4 MMOL/L (ref 3.4–5.3)
PROT SERPL-MCNC: 8 G/DL (ref 6.8–8.8)
RBC # BLD AUTO: 5.37 10E12/L (ref 4.4–5.9)
SODIUM SERPL-SCNC: 137 MMOL/L (ref 133–144)
T4 FREE SERPL-MCNC: 1.29 NG/DL (ref 0.76–1.46)
TSH SERPL DL<=0.005 MIU/L-ACNC: 2.3 MU/L (ref 0.4–4)
WBC # BLD AUTO: 6.2 10E9/L (ref 4–11)

## 2018-08-10 ASSESSMENT — PAIN SCALES - GENERAL: PAINLEVEL: NO PAIN (0)

## 2018-08-10 NOTE — MR AVS SNAPSHOT
After Visit Summary   8/10/2018    Cricket Chen    MRN: [id number]           Patient Information     Date Of Birth          1980        Visit Information        Provider Department      8/10/2018 1:50 PM Zuleyma Gray MD [facility] Endocrinology        Today's Diagnoses     Papillary thyroid carcinoma (H)    -  1       Follow-ups after your visit        Your next 10 appointments already scheduled     Aug 10, 2018  2:30 PM CDT   US HEAD NECK SOFT TISSUE with UCUS1   [facility] Imaging Center US ([facility])    [address] [place] [id number] [phone]           Please bring a list of your medicines (including vitamins, minerals and over-the-counter drugs). Also, tell your doctor about any allergies you may have. Wear comfortable clothes and leave your valuables at home.  You do not need to do anything special to prepare for your exam.  Please call the Imaging Department at your exam site with any questions.            Aug 13, 2018  7:30 AM CDT   (Arrive by 7:15 AM)   PAC EVALUATION with DARYN Chakraborty CNP   [facility] Preoperative Assessment [place] ([facility] [place])    [address] [place] [address]   [phone]            Aug 13, 2018  8:30 AM CDT   (Arrive by 8:15 AM)   PAC RN ASSESSMENT with Destiny Pac Rn   [facility] Preoperative Assessment [place] ([facility])    [address] [place] [address]   [phone]            Aug 13, 2018  9:00 AM CDT   (Arrive by 8:45 AM)   PAC Anesthesia Consult with Destiny Pac Anesthesiologist   [facility] Preoperative Assessment [place] ([facility])    [address] [place] [address]   [phone]            Aug 13, 2018  9:15 AM CDT   (Arrive by 9:00 AM)   PAC Pharmacist with Destiny Pac Pharmacist   [facility] Preoperative Assessment [place] ([facility])    [address] [facility]  [address] [address] [phone]            Aug 17, 2018   Procedure with John Payton MD   [facility] [place] (--)    [address]  [place] [address]   [phone]            Aug 21, 2018 11:15 AM CDT   LAB with FZ LAB   [facility] ([facility])    [address]  [place] [address] [phone]           Please do not eat 10-12 hours before your appointment if you are coming in fasting for labs on lipids, cholesterol, or glucose (sugar). This does not apply to pregnant women. Water, hot tea and black coffee (with nothing added) are okay. Do not drink other fluids, diet soda or chew gum.            Oct 18, 2018 12:45 PM CDT   (Arrive by 12:30 PM)   Return Visit with Yuval Dobbins MD   [facility] ([facility] [place])    [address] [place] [address]  [address] [facility] [address] [phone]            Jan 08, 2019 12:00 PM CST   CT CHEST ABDOMEN PELVIS W/O & W CONTRAST with UCCT2   [facility] ([facility] [place])    [address] [place] [id number] [phone]           Please bring any scans or X-rays taken at other hospitals, if similar tests were done. Also bring a list of your medicines, including vitamins, minerals and over-the-counter drugs. It is safest to leave personal items at home.  Be sure to tell your doctor:   If you have any allergies.   If there s any chance you are pregnant.   If you are breastfeeding.  How to prepare:   Do not eat or drink for 2 hours before your exam. If you need to take medicine, you may take it with small sips of water. (We may ask you to take liquid medicine as well.)   Please wear loose clothing, such as a sweat suit or jogging clothes. Avoid snaps, zippers and other metal. We may ask you to undress and put on a hospital gown.  Please arrive 30 minutes  early for your CT. Once in the department you might be asked to drink water 15-20 minutes prior to your exam.  If indicated you may be asked to drink an oral contrast in advance of your CT.  If this is the case, the imaging team will let you know or be in contact with you prior to your appointment  Patients over 70 or patients with diabetes or kidney problems:   If you haven t had a blood test (creatinine test) within the last 30 days, the Cardiologist/Radiologist may require you to get this test prior to your exam.  If you have diabetes:   Continue to take your metformin medication on the day of your exam  If you have any questions, please call the Imaging Department where you will have your exam.            George 15, 2019 11:15 AM [facility]   [facility] Lab Draw with  [facility] LAB DRAW   [facility] Lab Draw ([facility])    [address] [place] [address]  [address] [place] [address] [phone]              Future tests that were ordered for you today     Open Future Orders        Priority Expected Expires Ordered    US Head Neck Soft Tissue Routine  8/10/2019 8/10/2018    TSH Routine 8/10/2018 8/10/2019 8/10/2018    T4 free Routine 8/10/2018 8/10/2019 8/10/2018    Thyroglobulin (Total, antibody & recovery %) Routine 8/10/2018 8/10/2019 8/10/2018    Lactate Dehydrogenase Routine 2/9/2019 8/9/2019 8/9/2018    CBC with platelets differential Routine 2/9/2019 8/9/2019 8/9/2018    Comprehensive metabolic panel Routine 2/9/2019 8/9/2019 8/9/2018            Who to contact     Please call your clinic at [phone] to:    Ask questions about your health    Make or cancel appointments    Discuss your medicines    Learn about your test results    Speak to your doctor            Additional Information About Your Visit        [employer] Information     [employer] gives you secure access to your electronic health record. If you see a primary care provider, you can also send messages to your care team and make  "appointments. If you have questions, please call your primary care clinic.  If you do not have a primary care provider, please call [phone] and they will assist you.      [employer] is an electronic gateway that provides easy, online access to your medical records. With [employer], you can request a clinic appointment, read your test results, renew a prescription or communicate with your care team.     To access your existing account, please contact your [facility] or call [phone] for assistance.        Care EveryWhere ID     This is your Care EveryWhere ID. This could be used by other organizations to access your [facility] medical records  [id number]        Your Vitals Were     Pulse Height BMI (Body Mass Index)             65 1.778 m (5' 10\") 27.46 kg/m2          Blood Pressure from Last 3 Encounters:   08/10/18 130/85   08/07/18 108/72   08/06/18 125/82    Weight from Last 3 Encounters:   08/10/18 86.8 kg (191 lb 6.4 oz)   08/07/18 87.6 kg (193 lb 1.6 oz)   08/06/18 86.1 kg (189 lb 12.8 oz)               Primary Care Provider    [facility]       No address on file        Equal Access to Services     CLAY VIDES AH: Hadii aad ku hadasho Soomaali, waaxda luqadaha, qaybta kaalmada adeegyada, robina sibley. So [facility] [phone].    ATENCIÓN: Si habla español, tiene a laura disposición servicios gratuitos de asistencia lingüística. Llame al [phone].    We comply with applicable federal civil rights laws and [place] laws. We do not discriminate on the basis of race, color, national origin, age, disability, sex, sexual orientation, or gender identity.            Thank you!     Thank you for choosing [facility] ENDOCRINOLOGY  for your care. Our goal is always to provide you with excellent care. Hearing back from our patients is one way we can continue to improve our services. Please take a few minutes to complete the written survey " that you may receive in the mail after your visit with us. Thank you!             Your Updated Medication List - Protect others around you: Learn how to safely use, store and throw away your medicines at www.disposemymeds.org.          This list is accurate as of 8/10/18  2:08 PM.  Always use your most recent med list.                   Brand Name Dispense Instructions for use Diagnosis    amLODIPine 10 MG tablet    NORVASC    90 tablet    TAKE ONE TABLET BY MOUTH EVERY DAY    HTN (hypertension)       apixaban ANTICOAGULANT 5 MG tablet    ELIQUIS    180 tablet    Take 1 tablet (5 mg) by mouth 2 times daily    Atrial flutter, unspecified type (H)       * ketoconazole 2 % cream    NIZORAL    60 g    Twice daily to areas of rash on face    Dermatitis, seborrheic       * ketoconazole 2 % shampoo    NIZORAL    120 mL    Use as a foaming face wash in shower daily    Dermatitis, seborrheic       levothyroxine 137 MCG tablet    SYNTHROID/LEVOTHROID    90 tablet    Take 1 tablet (137 mcg) by mouth daily    Papillary thyroid carcinoma (H), Postoperative hypothyroidism       lisinopril 5 MG tablet    PRINIVIL/ZESTRIL    90 tablet    TAKE ONE TABLET BY MOUTH EVERY DAY    HTN (hypertension)       magnesium oxide 400 (241.3 Mg) MG tablet    MAG-OX    120 tablet    Take 1 tablet (400 mg) by mouth 2 times daily    Hypomagnesemia       metoprolol succinate 25 MG 24 hr tablet    TOPROL-XL    90 tablet    Take 1 tablet (25 mg) by mouth daily    Atrial flutter, unspecified type (H)       mycophenolate 250 MG capsule    GENERIC EQUIVALENT    60 capsule    Take 1 capsule (250 mg) by mouth 2 times daily    Kidney replaced by transplant       predniSONE 5 MG tablet    DELTASONE    90 tablet    Take 2.5 mg once daily for 14 days, then stop    Liver replaced by transplant (H), S/P kidney transplant       sulfamethoxazole-trimethoprim 400-80 MG per tablet    BACTRIM/SEPTRA    30 tablet    Take 1 tablet by mouth daily    Liver replaced by  transplant (H), S/P kidney transplant       * tacrolimus 1 MG capsule    GENERIC EQUIVALENT    60 capsule    Take 1 capsule (1 mg) by mouth 2 times daily 1.5 mg twice per day.    Liver replaced by transplant (H), S/P kidney transplant       * tacrolimus 0.5 MG capsule    GENERIC EQUIVALENT    60 capsule    Take 1 capsule (0.5 mg) by mouth 2 times daily 1.5 mg twice per day.    S/P kidney transplant       * Notice:  This list has 4 medication(s) that are the same as other medications prescribed for you. Read the directions carefully, and ask your doctor or other care provider to review them with you.

## 2018-08-10 NOTE — PROGRESS NOTES
Endocrinology Note         Cricket is a 37 year old male presents today for follow up post total thyroidectomy    HPI  Cricket Chen is a 37 years old male with hx of post liver and kidney transplant in 5/2016, posttransplant lymphoproliferative disorder, atrial flutter status post direct current cardioversion, hypertension who is here for follow up post total thyroidectomy    I saw him first time in March 2017 for hypermetabolic focus in the right lobe of the thyroid with a max SUV of 12.7 on PET that was noted during baseline evaluation for diffuse large B cell lymphoma. Otherwise no suspicious FDG uptake within the head and neck. He also has PET-avid right axillary lymphadenopathy measuring 1.3 cm but no evidence of FDG-avid areas throughout the rest of his body.     At that time, US thyroid showed 1 cm mid/inferior solid right thyroid nodule which likely corresponds to the area of FDG avidity on prior PET CT. Subsequent FNA of that nodule showed positive for PTC.     Because he was on chemotherapy for PTLD treatment, we have postponed thyroid cancer treatment until he is one for cancer treatment. He ultimately went total thyroidectomy on 8/7/2017 by . His postoperative course was uncomplicated. Pathology showed PTC 0.8 cm in the right lobe with negative margin for carcinoma, 0.2 cm follicular adenoma was also identify in the right lobe. In the left lobe, 0.1 cm of PTC with negative margin and 1.2 cm benign colloid cyst were noted. No lymphovascular invasion was identified.    Interim history  He has been doing well. He is taking levothyroxine 137  g a day which was reduced from 150 mcg 3 months ago. He denied difficulty swallowing or breathing. He denied any muscle twitching, muscle cramping, hand numbness or tingling. He followed up with oncologist for PTLD which is currently in remission.     He is on prednisone 5 mg daily for post liver and kidney transplant.    He will have surgery for  pseudoaneurysm and repair of affected artery on the right next Friday.    He is currently working part-time at [employer].    Past Medical History  Past Medical History:   Diagnosis Date     Alcohol abuse     Last drink in Mid-April 2014     Anemia in ESRD (end-stage renal disease) (H)      Anxiety 2008     Atrial flutter (H)      Cirrhosis (H)     S/P liver transplant     Depression      History of blood transfusion      History of transposition of great vessels     atrial switch at age 8 months old     Hypertension      Liver transplant recipient (H)     2016     Papillary thyroid carcinoma (H)      Pneumonia 11-15-14     Renal transplant recipient     2016     Varices, esophageal (H)        Allergies  Allergies   Allergen Reactions     Hydromorphone Itching     Medications  Current Outpatient Prescriptions   Medication Sig Dispense Refill     amLODIPine (NORVASC) 10 MG tablet TAKE ONE TABLET BY MOUTH EVERY DAY 90 tablet 3     apixaban ANTICOAGULANT (ELIQUIS) 5 MG tablet Take 1 tablet (5 mg) by mouth 2 times daily 180 tablet 3     ketoconazole (NIZORAL) 2 % cream Twice daily to areas of rash on face 60 g 1     ketoconazole (NIZORAL) 2 % shampoo Use as a foaming face wash in shower daily 120 mL 3     levothyroxine (SYNTHROID/LEVOTHROID) 137 MCG tablet Take 1 tablet (137 mcg) by mouth daily 90 tablet 3     lisinopril (PRINIVIL/ZESTRIL) 5 MG tablet TAKE ONE TABLET BY MOUTH EVERY DAY 90 tablet 3     magnesium oxide (MAG-OX) 400 (241.3 MG) MG tablet Take 1 tablet (400 mg) by mouth 2 times daily 120 tablet 11     metoprolol succinate (TOPROL-XL) 25 MG 24 hr tablet Take 1 tablet (25 mg) by mouth daily 90 tablet 3     mycophenolate (GENERIC EQUIVALENT) 250 MG capsule Take 1 capsule (250 mg) by mouth 2 times daily 60 capsule 11     predniSONE (DELTASONE) 5 MG tablet Take 2.5 mg once daily for 14 days, then stop 90 tablet 3     sulfamethoxazole-trimethoprim (BACTRIM/SEPTRA) 400-80 MG per tablet Take 1 tablet  "by mouth daily 30 tablet 11     tacrolimus (GENERIC EQUIVALENT) 0.5 MG capsule Take 1 capsule (0.5 mg) by mouth 2 times daily 1.5 mg twice per day. 60 capsule 5     tacrolimus (GENERIC EQUIVALENT) 1 MG capsule Take 1 capsule (1 mg) by mouth 2 times daily 1.5 mg twice per day. 60 capsule 5     Family History  family history includes Arthritis in his father; Hypertension in his brother, father, mother, sister, and sister. There is no history of Alcohol/Drug or GASTROINTESTINAL DISEASE.   No family hx of thyroid cancer    Social History  Social History   Substance Use Topics     Smoking status: Former Smoker     Packs/day: 0.33     Years: 3.00     Types: Cigarettes     Start date: 8/20/1997     Quit date: 9/21/2000     Smokeless tobacco: Former User     Alcohol use No      Comment: ~10 drinks per day for ten years, quit in April of 2014     quit drinking  Non , no children    ROS  Constitutional: no weight change, good energy, no night sweat  Eyes: no vision change, diplopia or red eyes   Neck: no difficulty swallowing, no choking, no neck pain, no neck swelling  Cardiovascular: no chest pain, palpitations  Respiratory: no dyspnea, cough, shortness of breath or wheezing   GI: no nausea, vomiting, diarrhea or constipation, no abdominal pain   : no change in urine, no dysuria or hematuria  Musculoskeletal: no joint or muscle pain or swelling   Integumentary: no concerning lesions  Neuro: no loss of strength or sensation, no numbness or tingling, no tremor, no dizziness, no headache   Endo: no polyuria or polydipsia, no temperature intolerance   Heme/Lymph: no concerning bumps, no bleeding problems   Allergy: no environmental allergies   Psych: no depression or anxiety     Physical Exam  /85  Pulse 65  Ht 1.778 m (5' 10\")  Wt 86.8 kg (191 lb 6.4 oz)  BMI 27.46 kg/m2  Body mass index is 27.46 kg/(m^2).  Constitutional: no distress, comfortable, pleasant   Eyes: anicteric, normal extra-ocular movements, " no lid lag or retraction  Neck: well healed surgical scar at the upper part of the right neck and lower neck, no discrete nodule  Cardiovascular: regular rate and rhythm, normal S1 and S2, no murmurs  Respiratory: clear to auscultation, no wheezes or crackles, normal breath sounds   Gastrointestinal:  Surgical scar along right and left costal area, nontender, no hepatomegaly, no masses   Musculoskeletal: no edema   Skin: no jaundice   Neurological: cranial nerves intact, 2+ reflexes at patella , normal gait, no tremor on outstretched hands bilaterally  Psychological: appropriate mood   Lymphatic: no cervical  lymphadenopathy.    RESULTS  PET 2/4/2017  HEAD/NECK:  Hypermetabolic focus in the right lobe of the thyroid with a max SUV of 12.7. Otherwise no suspicious FDG uptake within the head and neck. There is a 3.0 x 2.2 cm postoperative collection in the right lateral  neck anterior to the sternocleidal mastoid muscle, lateral to the right submandibular gland.   No abnormality identified within the mucosal spaces of the neck. Tongue base is normal. No lymphadenopathy within the neck. Limited head CT is within normal limits.    IMPRESSION:   1. Hypermetabolic right axillary lymph node, suspicious for involvement by lymphoproliferative disease.  2. Hypermetabolic lesion in the right thyroid with a max SUV of 12.7. No definite CT correlate identified. Ultrasound of the thyroid is recommended.  3. Indeterminant 2.2 cm area of decreased enhancement within the right lower quadrant transplant kidney. It does not appear to be  masslike. Ultrasound is recommended for further characterization   4. Postsurgical changes of liver and right lower quadrant kidney transplantation.  5. Transposition of great vessels with postsurgical changes of atrial baffle procedure resulting in a large, presumably intentional atrial septal defect.     US thyroid 3/2/2017  Thyroid parenchyma: Homogeneous  The right lobe of the thyroid measures:  1.6 x 1.6 x 4.5 cm   The thyroid isthmus measures: 0.2 cm   The left lobe of the thyroid measures: 1.6 x 1.1 x 4 cm      Right lobe:  Nodule 1:  Nodule measurement: 0.3 x 0.3 x 0.3 cm  Echogenicity: Predominantly isoechoic  Consistency: Mixed cystic and solid  Calcifications: no  Hypervascular: Minimal peripheral vascularity  Interval growth (>20%): No prior imaging available     Nodule 2:  Nodule measurement: 0.9 x 0.8 x 0.8 cm  Echogenicity: Hypoechoic  Consistency: solid  Calcifications: no  Hypervascular: yes  Interval growth (>20%): No prior imaging available     Isthmus: No nodule     Left Lobe:   Nodule 1:  Nodule measurement: 0.7 x 0.5 x 0.8 cm  Echogenicity: Hypoechoic  Consistency: cystic  Calcifications: no; nondependent echogenic focus with ringdown  artifact most compatible with inspissated colloid.  Hypervascular: no  Interval growth (>20%): No prior imaging available     Impression:  1.  Almost 1 cm mid/inferior solid right thyroid nodule which likely corresponds to the area of FDG avidity on prior PET CT. Consider FNA for further evaluation.  2.  Additional subcentimeter right thyroid nodule and left thyroid colloid cyst are noted    FNA right thyroid nodule 3/16/2017  Thyroid, right #2, ultrasound-guided fine needle aspiration:   Positive for malignancy.   Papillary thyroid carcinoma   Specimen Adequacy: Satisfactory for evaluation.    Surgical pathology 8/7/17  FINAL DIAGNOSIS:   A- Thyroid, right, lobectomy:   - Papillary thyroid microcarcinoma: 0.8 cm in greatest dimension   - Margins negative for carcinoma   - Perineural and vascular invasion not identified.   - Follicular adenoma, 0.2 cm in greatest dimension   - See tumor synopsis for details     B- Thyroid, left, lobectomy:   - Papillary thyroid microcarcinoma: 0.1 cm in greatest dimension   - Margins negative for carcinoma   - Perineural and vascular invasion not identified.   - Benign colloid cyst: 1.2 cm in greatest dimension   - See tumor  synopsis for details     Report Name: Thyroid Gland - Resection   Status: Submitted     Part(s) Involved:   A: Thyroid, right     Synoptic Report:     SPECIMEN     Procedure:         - Total thyroidectomy     TUMOR     Histologic Type:         - Papillary carcinoma       Common Significant Variants:           - Classical (usual, conventional)     Tumor Size: 0.8 cm     Tumor Laterality:         - Right lobe         - Left lobe     Tumor Focality:         - Multifocal     Tumor Extent       Extrathyroidal Extension:           - Not identified     Accessory Tumor Findings       Angioinvasion (vascular invasion):           - Not identified       Lymphatic Invasion:           - Not identified       Perineural Invasion:           - Not identified     MARGINS     Margins:         - Margins uninvolved by carcinoma     LYMPH NODES     Regional Lymph Nodes:         - No nodes submitted or found     STAGE (PTNM)     TNM Descriptors:         - m (multiple primary tumors)     Primary Tumor (pT):         - pT1a:  Tumor 1 cm or less in greatest dimension limited to the   thyroid.     Regional Lymph Nodes (pN):         - pNX: Regional lymph nodes cannot be assessed     ADDITIONAL FINDINGS     Additional Pathologic Findings:         - Adenoma     US head/neck 7/3/2017  COMPARISON: CT neck 3/28/2017, ultrasound thyroid 3/2/2017.     Lymph nodes are measured bilaterally with measurements given in transverse, AP, and length (craniocaudal) dimensions as follows:     Right:  Level 2: No lymph nodes identified.     Level 3:   1: 7 x 2 mm lymph node.     Level 4:  1: 7 x 5 x 11 mm lymph node.  2: 8 x 4 x 9 mm lymph node.     Level 5: No lymph nodes identified.     Level 6: No lymph nodes identified.     Level 7: No lymph nodes identified.     Left:  Level 2:   1: 9 x 5 x 6 mm lymph node.  2: 6 x 4 x 10 mm lymph node.     Level 3: No lymph nodes identified.     Level 4: No lymph nodes identified.     Level 5: No lymph nodes  identified.     Level 6: No lymph nodes identified.     Level 7: No lymph nodes identified.     Thyroid: Thyroid nodules partially visualized, including the 8 mm nodule in the inferior right lobe, and an 8 mm hypoechoic left inferior nodule.      IMPRESSION:  1.  Lymph nodes as described without malignant features.  2.  Partially visualized thyroid nodules, not significantly changed from 3/16/2017.    9/27/2017: TSH 0.13, FT4 1.17, Tg 0.23, TgAb<0.4  2/9/2018: TSH 0.08, free T4 1.52, Tg 0.19, TgAb<0.4  3/6/2018: TSH 0.12, free T4 1.87  4/3/2018: TSH 0.39, free T4 1.36  6/12/2018: TSH 0.94, free T4 1.08    ASSESSMENT:    Ijeoma Dobson is a 37 years old male with hx of post liver and kidney transplant in 5/2016, posttransplant lymphoproliferative disorder, atrial flutter status post direct current cardioversion, hypertension who is here for follow up post total thyroidectomy    1) multifocal micro PTC: he was noted to have thyroid cancer during evaluation of diffuse large B cell lymphoma. He was initially noted for hypermetabolic activity at right thyroid gland with SUV max of 12.7 from PET scan. Subsequent FNA of right thyroid nodule showed positive for thyroid cancer. He underwent total thyroidectomy without complications.  His prognosis is favorable given micro PTC, negative margin and no lymphovascular invasion.  Thyroglobulin in February 2018 was 0.19 with negative thyroglobulin antibody.  I will monitor thyroglobulin for now.  We will plan to check lab, TSH, FT4, Tg, TgAb, today    2) postoperative hypothyroidism: clinically euthyroid.  TSH June 2018 was 0.94 on levothyroxine 137 mcg a day.    PLAN:   - lab for TSH, FT4, Tg, TgAb, ultrasound head and neck  - RTC 6-8 months     Addendum  Results for IJEOMA DOBSON (MRN [id number]) as of 8/13/2018 17:12   Ref. Range 8/10/2018 14:25   T4 Free Latest Ref Range: 0.76 - 1.46 ng/dL 1.29   TSH Latest Ref Range: 0.40 - 4.00 mU/L 2.30     Will increase levothyroxine  137, 1 pill for 6 days and 1.5 pill for 1 day per week  [employer] message sent      Zuleyma Gray MD     Division of Diabetes and Endocrinology  Department of Medicine  [phone]

## 2018-08-10 NOTE — NURSING NOTE
Chief Complaint   Patient presents with     RECHECK     POST OP FOR THYROIDECTOMY      Chapis Key CMA

## 2018-08-10 NOTE — LETTER
8/10/2018       RE: Cricket Chen   [address]  [facility] [address]     Dear Colleague,    Thank you for referring your patient, Cricket Chen, to the [facility] ENDOCRINOLOGY at [facility]. Please see a copy of my visit note below.         Endocrinology Note         Cricket is a 37 year old male presents today for follow up post total thyroidectomy    HPI  Cricket Chen is a 37 years old male with hx of post liver and kidney transplant in 5/2016, posttransplant lymphoproliferative disorder, atrial flutter status post direct current cardioversion, hypertension who is here for follow up post total thyroidectomy    I saw him first time in March 2017 for hypermetabolic focus in the right lobe of the thyroid with a max SUV of 12.7 on PET that was noted during baseline evaluation for diffuse large B cell lymphoma. Otherwise no suspicious FDG uptake within the head and neck. He also has PET-avid right axillary lymphadenopathy measuring 1.3 cm but no evidence of FDG-avid areas throughout the rest of his body.     At that time, US thyroid showed 1 cm mid/inferior solid right thyroid nodule which likely corresponds to the area of FDG avidity on prior PET CT. Subsequent FNA of that nodule showed positive for PTC.     Because he was on chemotherapy for PTLD treatment, we have postponed thyroid cancer treatment until he is one for cancer treatment. He ultimately went total thyroidectomy on 8/7/2017 by . His postoperative course was uncomplicated. Pathology showed PTC 0.8 cm in the right lobe with negative margin for carcinoma, 0.2 cm follicular adenoma was also identify in the right lobe. In the left lobe, 0.1 cm of PTC with negative margin and 1.2 cm benign colloid cyst were noted. No lymphovascular invasion was identified.    Interim history  He has been doing well. He is taking levothyroxine 137  g a day which was reduced from 150 mcg 3 months ago. He denied  difficulty swallowing or breathing. He denied any muscle twitching, muscle cramping, hand numbness or tingling. He followed up with oncologist for PTLD which is currently in remission.     He is on prednisone 5 mg daily for post liver and kidney transplant.    He will have surgery for pseudoaneurysm and repair of affected artery on the right next Friday.    He is currently working part-time at [employer].    Past Medical History  Past Medical History:   Diagnosis Date     Alcohol abuse     Last drink in Mid-April 2014     Anemia in ESRD (end-stage renal disease) (H)      Anxiety 2008     Atrial flutter (H)      Cirrhosis (H)     S/P liver transplant     Depression      History of blood transfusion      History of transposition of great vessels     atrial switch at age 8 months old     Hypertension      Liver transplant recipient (H)     2016     Papillary thyroid carcinoma (H)      Pneumonia 11-15-14     Renal transplant recipient     2016     Varices, esophageal (H)        Allergies  Allergies   Allergen Reactions     Hydromorphone Itching     Medications  Current Outpatient Prescriptions   Medication Sig Dispense Refill     amLODIPine (NORVASC) 10 MG tablet TAKE ONE TABLET BY MOUTH EVERY DAY 90 tablet 3     apixaban ANTICOAGULANT (ELIQUIS) 5 MG tablet Take 1 tablet (5 mg) by mouth 2 times daily 180 tablet 3     ketoconazole (NIZORAL) 2 % cream Twice daily to areas of rash on face 60 g 1     ketoconazole (NIZORAL) 2 % shampoo Use as a foaming face wash in shower daily 120 mL 3     levothyroxine (SYNTHROID/LEVOTHROID) 137 MCG tablet Take 1 tablet (137 mcg) by mouth daily 90 tablet 3     lisinopril (PRINIVIL/ZESTRIL) 5 MG tablet TAKE ONE TABLET BY MOUTH EVERY DAY 90 tablet 3     magnesium oxide (MAG-OX) 400 (241.3 MG) MG tablet Take 1 tablet (400 mg) by mouth 2 times daily 120 tablet 11     metoprolol succinate (TOPROL-XL) 25 MG 24 hr tablet Take 1 tablet (25 mg) by mouth daily 90 tablet 3      mycophenolate (GENERIC EQUIVALENT) 250 MG capsule Take 1 capsule (250 mg) by mouth 2 times daily 60 capsule 11     predniSONE (DELTASONE) 5 MG tablet Take 2.5 mg once daily for 14 days, then stop 90 tablet 3     sulfamethoxazole-trimethoprim (BACTRIM/SEPTRA) 400-80 MG per tablet Take 1 tablet by mouth daily 30 tablet 11     tacrolimus (GENERIC EQUIVALENT) 0.5 MG capsule Take 1 capsule (0.5 mg) by mouth 2 times daily 1.5 mg twice per day. 60 capsule 5     tacrolimus (GENERIC EQUIVALENT) 1 MG capsule Take 1 capsule (1 mg) by mouth 2 times daily 1.5 mg twice per day. 60 capsule 5     Family History  family history includes Arthritis in his father; Hypertension in his brother, father, mother, sister, and sister. There is no history of Alcohol/Drug or GASTROINTESTINAL DISEASE.   No family hx of thyroid cancer    Social History  Social History   Substance Use Topics     Smoking status: Former Smoker     Packs/day: 0.33     Years: 3.00     Types: Cigarettes     Start date: 8/20/1997     Quit date: 9/21/2000     Smokeless tobacco: Former User     Alcohol use No      Comment: ~10 drinks per day for ten years, quit in April of 2014     quit drinking  Non , no children    ROS  Constitutional: no weight change, good energy, no night sweat  Eyes: no vision change, diplopia or red eyes   Neck: no difficulty swallowing, no choking, no neck pain, no neck swelling  Cardiovascular: no chest pain, palpitations  Respiratory: no dyspnea, cough, shortness of breath or wheezing   GI: no nausea, vomiting, diarrhea or constipation, no abdominal pain   : no change in urine, no dysuria or hematuria  Musculoskeletal: no joint or muscle pain or swelling   Integumentary: no concerning lesions  Neuro: no loss of strength or sensation, no numbness or tingling, no tremor, no dizziness, no headache   Endo: no polyuria or polydipsia, no temperature intolerance   Heme/Lymph: no concerning bumps, no bleeding problems   Allergy: no  "environmental allergies   Psych: no depression or anxiety     Physical Exam  /85  Pulse 65  Ht 1.778 m (5' 10\")  Wt 86.8 kg (191 lb 6.4 oz)  BMI 27.46 kg/m2  Body mass index is 27.46 kg/(m^2).  Constitutional: no distress, comfortable, pleasant   Eyes: anicteric, normal extra-ocular movements, no lid lag or retraction  Neck: well healed surgical scar at the upper part of the right neck and lower neck, no discrete nodule  Cardiovascular: regular rate and rhythm, normal S1 and S2, no murmurs  Respiratory: clear to auscultation, no wheezes or crackles, normal breath sounds   Gastrointestinal:  Surgical scar along right and left costal area, nontender, no hepatomegaly, no masses   Musculoskeletal: no edema   Skin: no jaundice   Neurological: cranial nerves intact, 2+ reflexes at patella , normal gait, no tremor on outstretched hands bilaterally  Psychological: appropriate mood   Lymphatic: no cervical  lymphadenopathy.    RESULTS  PET 2/4/2017  HEAD/NECK:  Hypermetabolic focus in the right lobe of the thyroid with a max SUV of 12.7. Otherwise no suspicious FDG uptake within the head and neck. There is a 3.0 x 2.2 cm postoperative collection in the right lateral  neck anterior to the sternocleidal mastoid muscle, lateral to the right submandibular gland.   No abnormality identified within the mucosal spaces of the neck. Tongue base is normal. No lymphadenopathy within the neck. Limited head CT is within normal limits.    IMPRESSION:   1. Hypermetabolic right axillary lymph node, suspicious for involvement by lymphoproliferative disease.  2. Hypermetabolic lesion in the right thyroid with a max SUV of 12.7. No definite CT correlate identified. Ultrasound of the thyroid is recommended.  3. Indeterminant 2.2 cm area of decreased enhancement within the right lower quadrant transplant kidney. It does not appear to be  masslike. Ultrasound is recommended for further characterization   4. Postsurgical changes of liver " and right lower quadrant kidney transplantation.  5. Transposition of great vessels with postsurgical changes of atrial baffle procedure resulting in a large, presumably intentional atrial septal defect.     US thyroid 3/2/2017  Thyroid parenchyma: Homogeneous  The right lobe of the thyroid measures: 1.6 x 1.6 x 4.5 cm   The thyroid isthmus measures: 0.2 cm   The left lobe of the thyroid measures: 1.6 x 1.1 x 4 cm      Right lobe:  Nodule 1:  Nodule measurement: 0.3 x 0.3 x 0.3 cm  Echogenicity: Predominantly isoechoic  Consistency: Mixed cystic and solid  Calcifications: no  Hypervascular: Minimal peripheral vascularity  Interval growth (>20%): No prior imaging available     Nodule 2:  Nodule measurement: 0.9 x 0.8 x 0.8 cm  Echogenicity: Hypoechoic  Consistency: solid  Calcifications: no  Hypervascular: yes  Interval growth (>20%): No prior imaging available     Isthmus: No nodule     Left Lobe:   Nodule 1:  Nodule measurement: 0.7 x 0.5 x 0.8 cm  Echogenicity: Hypoechoic  Consistency: cystic  Calcifications: no; nondependent echogenic focus with ringdown  artifact most compatible with inspissated colloid.  Hypervascular: no  Interval growth (>20%): No prior imaging available     Impression:  1.  Almost 1 cm mid/inferior solid right thyroid nodule which likely corresponds to the area of FDG avidity on prior PET CT. Consider FNA for further evaluation.  2.  Additional subcentimeter right thyroid nodule and left thyroid colloid cyst are noted    FNA right thyroid nodule 3/16/2017  Thyroid, right #2, ultrasound-guided fine needle aspiration:   Positive for malignancy.   Papillary thyroid carcinoma   Specimen Adequacy: Satisfactory for evaluation.    Surgical pathology 8/7/17  FINAL DIAGNOSIS:   A- Thyroid, right, lobectomy:   - Papillary thyroid microcarcinoma: 0.8 cm in greatest dimension   - Margins negative for carcinoma   - Perineural and vascular invasion not identified.   - Follicular adenoma, 0.2 cm in  greatest dimension   - See tumor synopsis for details     B- Thyroid, left, lobectomy:   - Papillary thyroid microcarcinoma: 0.1 cm in greatest dimension   - Margins negative for carcinoma   - Perineural and vascular invasion not identified.   - Benign colloid cyst: 1.2 cm in greatest dimension   - See tumor synopsis for details     Report Name: Thyroid Gland - Resection   Status: Submitted     Part(s) Involved:   A: Thyroid, right     Synoptic Report:     SPECIMEN     Procedure:         - Total thyroidectomy     TUMOR     Histologic Type:         - Papillary carcinoma       Common Significant Variants:           - Classical (usual, conventional)     Tumor Size: 0.8 cm     Tumor Laterality:         - Right lobe         - Left lobe     Tumor Focality:         - Multifocal     Tumor Extent       Extrathyroidal Extension:           - Not identified     Accessory Tumor Findings       Angioinvasion (vascular invasion):           - Not identified       Lymphatic Invasion:           - Not identified       Perineural Invasion:           - Not identified     MARGINS     Margins:         - Margins uninvolved by carcinoma     LYMPH NODES     Regional Lymph Nodes:         - No nodes submitted or found     STAGE (PTNM)     TNM Descriptors:         - m (multiple primary tumors)     Primary Tumor (pT):         - pT1a:  Tumor 1 cm or less in greatest dimension limited to the   thyroid.     Regional Lymph Nodes (pN):         - pNX: Regional lymph nodes cannot be assessed     ADDITIONAL FINDINGS     Additional Pathologic Findings:         - Adenoma     US head/neck 7/3/2017  COMPARISON: CT neck 3/28/2017, ultrasound thyroid 3/2/2017.     Lymph nodes are measured bilaterally with measurements given in transverse, AP, and length (craniocaudal) dimensions as follows:     Right:  Level 2: No lymph nodes identified.     Level 3:   1: 7 x 2 mm lymph node.     Level 4:  1: 7 x 5 x 11 mm lymph node.  2: 8 x 4 x 9 mm lymph node.     Level 5:  No lymph nodes identified.     Level 6: No lymph nodes identified.     Level 7: No lymph nodes identified.     Left:  Level 2:   1: 9 x 5 x 6 mm lymph node.  2: 6 x 4 x 10 mm lymph node.     Level 3: No lymph nodes identified.     Level 4: No lymph nodes identified.     Level 5: No lymph nodes identified.     Level 6: No lymph nodes identified.     Level 7: No lymph nodes identified.     Thyroid: Thyroid nodules partially visualized, including the 8 mm nodule in the inferior right lobe, and an 8 mm hypoechoic left inferior nodule.      IMPRESSION:  1.  Lymph nodes as described without malignant features.  2.  Partially visualized thyroid nodules, not significantly changed from 3/16/2017.    9/27/2017: TSH 0.13, FT4 1.17, Tg 0.23, TgAb<0.4  2/9/2018: TSH 0.08, free T4 1.52, Tg 0.19, TgAb<0.4  3/6/2018: TSH 0.12, free T4 1.87  4/3/2018: TSH 0.39, free T4 1.36  6/12/2018: TSH 0.94, free T4 1.08    ASSESSMENT:    Cricket Chen is a 37 years old male with hx of post liver and kidney transplant in 5/2016, posttransplant lymphoproliferative disorder, atrial flutter status post direct current cardioversion, hypertension who is here for follow up post total thyroidectomy    1) multifocal micro PTC: he was noted to have thyroid cancer during evaluation of diffuse large B cell lymphoma. He was initially noted for hypermetabolic activity at right thyroid gland with SUV max of 12.7 from PET scan. Subsequent FNA of right thyroid nodule showed positive for thyroid cancer. He underwent total thyroidectomy without complications.  His prognosis is favorable given micro PTC, negative margin and no lymphovascular invasion.  Thyroglobulin in February 2018 was 0.19 with negative thyroglobulin antibody.  I will monitor thyroglobulin for now.  We will plan to check lab, TSH, FT4, Tg, TgAb, today    2) postoperative hypothyroidism: clinically euthyroid.  TSH June 2018 was 0.94 on levothyroxine 137 mcg a day.    PLAN:   - lab for TSH, FT4,  Tg, TgAb, ultrasound head and neck  - RTC 6-8 months     Addendum  Results for IJEOMA DOBSON (MRN [id number]) as of 8/13/2018 17:12   Ref. Range 8/10/2018 14:25   T4 Free Latest Ref Range: 0.76 - 1.46 ng/dL 1.29   TSH Latest Ref Range: 0.40 - 4.00 mU/L 2.30     Will increase levothyroxine 137, 1 pill for 6 days and 1.5 pill for 1 day per week  MyChart message sent      Zuleyma Gray MD     Division of Diabetes and Endocrinology  Department of Medicine  [phone]

## 2018-08-13 ENCOUNTER — APPOINTMENT (OUTPATIENT)
Dept: SURGERY | Facility: CLINIC | Age: 38
End: 2018-08-13
Payer: COMMERCIAL

## 2018-08-13 ENCOUNTER — ALLIED HEALTH/NURSE VISIT (OUTPATIENT)
Dept: SURGERY | Facility: CLINIC | Age: 38
End: 2018-08-13
Payer: COMMERCIAL

## 2018-08-13 ENCOUNTER — ANESTHESIA EVENT (OUTPATIENT)
Dept: SURGERY | Facility: CLINIC | Age: 38
End: 2018-08-13
Payer: COMMERCIAL

## 2018-08-13 ENCOUNTER — OFFICE VISIT (OUTPATIENT)
Dept: SURGERY | Facility: CLINIC | Age: 38
End: 2018-08-13
Payer: COMMERCIAL

## 2018-08-13 VITALS
HEIGHT: 70 IN | SYSTOLIC BLOOD PRESSURE: 137 MMHG | WEIGHT: 192.9 LBS | RESPIRATION RATE: 16 BRPM | OXYGEN SATURATION: 99 % | TEMPERATURE: 97.9 F | HEART RATE: 64 BPM | DIASTOLIC BLOOD PRESSURE: 90 MMHG | BODY MASS INDEX: 27.62 KG/M2

## 2018-08-13 DIAGNOSIS — C73 PAPILLARY THYROID CARCINOMA (H): Primary | ICD-10-CM

## 2018-08-13 DIAGNOSIS — C73 PAPILLARY THYROID CARCINOMA (H): ICD-10-CM

## 2018-08-13 DIAGNOSIS — E89.0 POSTOPERATIVE HYPOTHYROIDISM: ICD-10-CM

## 2018-08-13 DIAGNOSIS — I72.9 PSEUDOANEURYSM (H): ICD-10-CM

## 2018-08-13 DIAGNOSIS — Z94.0 S/P KIDNEY TRANSPLANT: ICD-10-CM

## 2018-08-13 DIAGNOSIS — Z01.818 PRE-OP EXAMINATION: Primary | ICD-10-CM

## 2018-08-13 PROBLEM — L21.9 DERMATITIS, SEBORRHEIC: Status: ACTIVE | Noted: 2018-08-13

## 2018-08-13 LAB — INR PPP: 1.11 (ref 0.86–1.14)

## 2018-08-13 RX ORDER — TACROLIMUS 0.5 MG/1
CAPSULE ORAL
Qty: 60 CAPSULE | Refills: 5 | Status: SHIPPED | OUTPATIENT
Start: 2018-08-13 | End: 2018-11-13

## 2018-08-13 RX ORDER — LEVOTHYROXINE SODIUM 137 UG/1
TABLET ORAL
Qty: 96 TABLET | Refills: 3 | Status: SHIPPED | OUTPATIENT
Start: 2018-08-13 | End: 2019-05-10

## 2018-08-13 ASSESSMENT — ENCOUNTER SYMPTOMS: DYSRHYTHMIAS: 1

## 2018-08-13 ASSESSMENT — LIFESTYLE VARIABLES: TOBACCO_USE: 1

## 2018-08-13 NOTE — H&P
"  Pre-Operative H & P     CC:  Preoperative exam to assess for increased cardiopulmonary risk while undergoing surgery and anesthesia.    Date of Encounter: 8/13/2018  Primary Care Physician:  [facility]  Reason for visit:   Right UE Pseudoaneurysm (H)      SIM Chen is a 37 year old male who presents for pre-operative H & P in preparation for  Repair Right Upper Arm Pseudo Aneurysm on 8/17/18 with Dr. Payton under general anesthesia.  Mr. 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    The patient was seen by Dr. Payton on 8/2/18 for \"large mass in the right antecubital space near the proximal anastomosis of an aneurysmal AV fistula that was ligated. Ultrasound demonstrates flow in the mass, c/w a pseudoaneurysm.\" The above surgery was recommended.       Mr. Chen presents to PAC and denies any chest pain, SOB, palpitations, syncope, BURNS, orthopnea, or PND.  He reports overall feeling well.  He reports his PTLD is in remission and is followed by Dr. Prather with last visit 8/7/2018 being seen every 6 months.  He would like to proceed with above surgical intervention.        History is obtained from the patient and electronic health record.     Past Medical History  Past Medical History:   Diagnosis Date     Alcohol abuse     Last drink in Mid-April 2014     Anemia in ESRD (end-stage renal disease) (H)      Anxiety 2008     Atrial flutter (H)      Cirrhosis (H)     S/P liver transplant     Depression      History of blood transfusion      History of transposition of great vessels     atrial switch at age 8 months old     Hypertension      Liver transplant " recipient (H)     2016     Papillary thyroid carcinoma (H)      Pneumonia 11-15-14     Renal transplant recipient     2016     Varices, esophageal (H)        Past Surgical History  Past Surgical History:   Procedure Laterality Date     ANESTHESIA CARDIOVERSION N/A 3/7/2018    Procedure: ANESTHESIA CARDIOVERSION;  Cardioversion;  Surgeon: GENERIC ANESTHESIA PROVIDER;  Location: [facility] OR     BENCH LIVER N/A 5/10/2016    Procedure: BENCH LIVER;  Surgeon: Ricky Deshpande MD;  Location: [facility]     BIOPSY LYMPH NODE CERVICAL Right 1/26/2017    Procedure: BIOPSY LYMPH NODE CERVICAL;  Surgeon: Beka Soto MD;  Location: [facility]     CARDIAC SURGERY  9-10-80     CREATE FISTULA ARTERIOVENOUS UPPER EXTREMITY Right 9/16/2014    Procedure: CREATE FISTULA ARTERIOVENOUS UPPER EXTREMITY;  Surgeon: Padmaja Eaton MD;  Location: [facility]     CYSTOSCOPY, REMOVE STENT(S), COMBINED Right 6/22/2016    Procedure: COMBINED CYSTOSCOPY, REMOVE STENT(S);  Surgeon: Ricky Deshpande MD;  Location: [facility] OR     ENT SURGERY       ESOPHAGOSCOPY, GASTROSCOPY, DUODENOSCOPY (EGD), COMBINED  5/30/2014    Procedure: COMBINED ESOPHAGOSCOPY, GASTROSCOPY, DUODENOSCOPY (EGD);  Surgeon: Guillaume Bautista MD;  Location:  GI     ESOPHAGOSCOPY, GASTROSCOPY, DUODENOSCOPY (EGD), COMBINED  9/30/14     ESOPHAGOSCOPY, GASTROSCOPY, DUODENOSCOPY (EGD), COMBINED Left 3/12/2015    Procedure: COMBINED ESOPHAGOSCOPY, GASTROSCOPY, DUODENOSCOPY (EGD);  Surgeon: Laureen Galvan MD;  Location: [facility] GI     ESOPHAGOSCOPY, GASTROSCOPY, DUODENOSCOPY (EGD), COMBINED N/A 4/21/2016    Procedure: COMBINED ESOPHAGOSCOPY, GASTROSCOPY, DUODENOSCOPY (EGD);  Surgeon: Laureen Galvan MD;  [facility]: [facility]     ESOPHAGOSCOPY, GASTROSCOPY, DUODENOSCOPY (EGD), COMBINED N/A 7/21/2016    Procedure: COMBINED ESOPHAGOSCOPY, GASTROSCOPY, DUODENOSCOPY (EGD);  Surgeon: Laureen Galvan MD;  Location: [facility] GI     HC OR CATH ABLATION NON-CARDIAC ENDOVASCULAR       SVT     INSERT PORT VASCULAR ACCESS N/A 4/3/2017    Procedure: INSERT PORT VASCULAR ACCESS;  Surgeon: Rajendra Jacques PA-C;  [facility]     LIGATE FISTULA ARTERIOVENOUS UPPER EXTREMITY Right 2017    Procedure: LIGATE FISTULA ARTERIOVENOUS UPPER EXTREMITY;  Revision of Right Arm Arteriovenous Fistula ;  Surgeon: Padmaja Eaton MD;  Location: [facility]     PICC INSERTION  14    PICC line placement 14; Removal 2014     THORACIC SURGERY  1980    Transposition great arteries, repaired at 8 months     THYROIDECTOMY Right 2017    Procedure: THYROIDECTOMY;  Bilateral Total Thyroidectomy ;  Surgeon: Beka Soto MD;  Location: [facility]     TRANSPLANT KIDNEY RECIPIENT  DONOR  5/10/2016    Procedure: TRANSPLANT KIDNEY RECIPIENT  DONOR;  Surgeon: Ricky Deshpande MD;  [facility]: [facility]     TRANSPLANT LIVER RECIPIENT  DONOR N/A 5/10/2016    Procedure: TRANSPLANT LIVER RECIPIENT  DONOR;  Surgeon: Ricky Deshpande MD;  Location: [facility]       Hx of Blood transfusions/reactions: yes without reaction.      Hx of abnormal bleeding or anti-platelet use: Apixaban    Steroid use in the last year: daily prednisone    Personal or FH with difficulty with Anesthesia:  denies    Prior to Admission Medications  Current Outpatient Prescriptions   Medication Sig Dispense Refill     amLODIPine (NORVASC) 10 MG tablet TAKE ONE TABLET BY MOUTH EVERY DAY 90 tablet 3     apixaban ANTICOAGULANT (ELIQUIS) 5 MG tablet Take 1 tablet (5 mg) by mouth 2 times daily 180 tablet 3     ketoconazole (NIZORAL) 2 % cream Twice daily to areas of rash on face 60 g 1     ketoconazole (NIZORAL) 2 % shampoo Use as a foaming face wash in shower daily 120 mL 3     levothyroxine (SYNTHROID/LEVOTHROID) 137 MCG tablet Take 1 tablet (137 mcg) by mouth daily 90 tablet 3     lisinopril (PRINIVIL/ZESTRIL) 5 MG tablet TAKE ONE TABLET BY MOUTH EVERY DAY 90 tablet 3     magnesium oxide (MAG-OX) 400 (241.3  MG) MG tablet Take 1 tablet (400 mg) by mouth 2 times daily 120 tablet 11     metoprolol succinate (TOPROL-XL) 25 MG 24 hr tablet Take 1 tablet (25 mg) by mouth daily 90 tablet 3     mycophenolate (GENERIC EQUIVALENT) 250 MG capsule Take 1 capsule (250 mg) by mouth 2 times daily 60 capsule 11     predniSONE (DELTASONE) 5 MG tablet Take 2.5 mg once daily for 14 days, then stop 90 tablet 3     sulfamethoxazole-trimethoprim (BACTRIM/SEPTRA) 400-80 MG per tablet Take 1 tablet by mouth daily 30 tablet 11     tacrolimus (GENERIC EQUIVALENT) 0.5 MG capsule Take 1 capsule (0.5 mg) by mouth 2 times daily 1.5 mg twice per day. 60 capsule 5     tacrolimus (GENERIC EQUIVALENT) 1 MG capsule Take 1 capsule (1 mg) by mouth 2 times daily 1.5 mg twice per day. 60 capsule 5       Allergies  Allergies   Allergen Reactions     Hydromorphone Itching       Social History  Social History     Social History     Marital status: Single     Spouse name: N/A     Number of children: 0     Years of education: N/A     Occupational History      Unemployed     Social History Main Topics     Smoking status: Former Smoker     Packs/day: 0.33     Years: 3.00     Types: Cigarettes     Start date: 8/20/1997     Quit date: 9/21/2000     Smokeless tobacco: Former User     Alcohol use No      Comment: ~10 drinks per day for ten years, quit in April of 2014     Drug use: No     Sexual activity: Not Currently     Partners: Female     Birth control/ protection: None     Other Topics Concern     Not on file     Social History Narrative    ? Blood transfusion as baby during surgery,    Professional performed tattoos     No Illicit IV or intranasal drug use.        Family History  Family History   Problem Relation Age of Onset     Hypertension Brother      Hypertension Sister      Arthritis Father      Hypertension Father      Hypertension Mother      Hypertension Sister      Alcohol/Drug No family hx of      GASTROINTESTINAL DISEASE No family hx of      no fam  "hx of liver disease or liver cancer     ROS/MED HX    ENT/Pulmonary:     (+)JUAN risk factors hypertension, tobacco use, Past use 0.33 packs/day  , . .    Neurologic:  - neg neurologic ROS     Cardiovascular: Comment: H/O transposition of great vessels, s/p surgical intervention as .     (+) hypertension----. Taking blood thinners Pt has received instructions: . . . :. dysrhythmias (H/O ablation ~ and since only one cardioversion 3/7/2018.) a-flutter, . 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  Last drink . S/p Kidney and liver transplant 2016.    (+) liver disease,       Renal/Genitourinary:     (+) chronic renal disease, type: ESRD, Pt does not require dialysis, Pt has history of transplant, date: 2016,       Endo:     (+) thyroid problem hypothyroidism, .      Psychiatric:     (+) psychiatric history (H/O anxiety>>>resolved.)       Infectious Disease:  - neg infectious disease ROS       Malignancy:   (+) Malignancy History of Other  Other CA PTLD Remission status post H/O thyroid cancer, s/p thyroidectomy with Dr. Soto 17.         Other:    (+) No chance of pregnancy no H/O Chronic Pain,       Temp: 97.9  F (36.6  C) Temp src: Oral BP: 137/90 Pulse: 64   Resp: 16 SpO2: 99 %         192 lbs 14.4 oz  5' 10\"   Body mass index is 27.68 kg/(m^2).       Physical Exam  Constitutional: Awake, alert, cooperative, no apparent distress, and appears stated age.  Eyes: Pupils equal, round and reactive to light, extra ocular muscles intact, sclera clear, conjunctiva normal.  HENT: Normocephalic, oral pharynx with moist mucus membranes, good dentition. No goiter appreciated.   Respiratory: Clear to " auscultation bilaterally, no crackles or wheezing.  Cardiovascular: Regular rate and rhythm, normal S1 and S2, and no murmur noted.  Carotids +2, no bruits. No edema. Palpable pulses to radial  DP and PT arteries.   GI: Normal bowel sounds, soft, non-distended, non-tender, no masses palpated, no hepatosplenomegaly.    Lymph/Hematologic: No cervical lymphadenopathy and no supraclavicular lymphadenopathy.  Genitourinary:  na  Skin: Warm and dry.  RUE with large elevated mass in antecubital area near AV fistula without bruit or thrill. Multiple tattoos.   Musculoskeletal: Full ROM of neck. There is no redness, warmth, or swelling of the joints. Gross motor strength is normal.    Neurologic: Awake, alert, oriented to name, place and time. Cranial nerves II-XII are grossly intact. Gait is normal.   Neuropsychiatric: Calm, cooperative. Normal affect.     Labs: (personally reviewed)  Lab Results   Component Value Date    WBC 6.2 08/10/2018     Lab Results   Component Value Date    RBC 5.37 08/10/2018     Lab Results   Component Value Date    HGB 16.2 08/10/2018     Lab Results   Component Value Date    HCT 48.4 08/10/2018     Lab Results   Component Value Date    MCV 90 08/10/2018     Lab Results   Component Value Date    MCH 30.2 08/10/2018     Lab Results   Component Value Date    MCHC 33.5 08/10/2018     Lab Results   Component Value Date    RDW 12.7 08/10/2018     Lab Results   Component Value Date     08/10/2018       Last Comprehensive Metabolic Panel:  Sodium   Date Value Ref Range Status   08/10/2018 137 133 - 144 mmol/L Final     Potassium   Date Value Ref Range Status   08/10/2018 4.4 3.4 - 5.3 mmol/L Final     Chloride   Date Value Ref Range Status   08/10/2018 104 94 - 109 mmol/L Final     Carbon Dioxide   Date Value Ref Range Status   08/10/2018 25 20 - 32 mmol/L Final     Anion Gap   Date Value Ref Range Status   08/10/2018 8 3 - 14 mmol/L Final     Glucose   Date Value Ref Range Status   08/10/2018 88  70 - 99 mg/dL Final     Urea Nitrogen   Date Value Ref Range Status   08/10/2018 21 7 - 30 mg/dL Final     Creatinine   Date Value Ref Range Status   08/10/2018 1.46 (H) 0.66 - 1.25 mg/dL Final     GFR Estimate   Date Value Ref Range Status   08/10/2018 54 (L) >60 mL/min/1.7m2 Final     Comment:     Non  GFR Calc     Calcium   Date Value Ref Range Status   08/10/2018 9.4 8.5 - 10.1 mg/dL Final       Lab Results   Component Value Date    AST 18 08/10/2018     Lab Results   Component Value Date    ALT 27 08/10/2018     Lab Results   Component Value Date    BILICONJ 2.4 07/15/2014      Lab Results   Component Value Date    BILITOTAL 0.6 08/10/2018     Lab Results   Component Value Date    ALBUMIN 4.6 08/10/2018     Lab Results   Component Value Date    PROTTOTAL 8.0 08/10/2018      Lab Results   Component Value Date    ALKPHOS 79 08/10/2018     Procedures  EKG (3/30:18): Sinus rhythm, RAD, RBBB, prominent RV forces with repolarization abnormality.         Echocardiogram 3/30/18  CONCLUSIONS:  Patient after atrial switch operation for complete transposition of the great  arteries. Technically difficult study due to poor acoustic windows. No baffle  obstruction or leaks seen. There is moderate right ventricular enlargement.  Single plane right ventricular EF 35-40 %. Normal left ventricular systolic  function. No outflow obstruction. Mild tricuspid regurgitation.    NEELIMA & Cardioversion 3/7/18  Interpretation Summary  Limited study  No intracardiac thrombus.  Systemic ventricle function is moderately reduced. Mild-moderate systemic  ventriclular dilation and hypertrophy.  Mild- moderate systemic atrioventricular valve regurgitation.  Normal subpulmonic ventricle size and function.  No intracardiac shunt as demonstrated by a bubble study  No pericardial effusion.  This study was compared with the study from 11/25/2014.There has been no  change.     EXAMINATION: Ultrasound extremity arterial, venous dialysis  access  graft, 7/25/2018 6:04 PM      COMPARISON: 7/8/2016      HISTORY: Arm mass, AV fistula  FINDINGS: There is a 3.8 x 3.3 x 7.0 cm echogenic, heterogeneous  well-circumscribed rounded area in the area of the palpable lump over  the right AC fossa. There is a vessel feeding the inferior aspect of  the lesion with arterial waveforms.      The right brachial artery is patent above and below the antecubital  fossa with normal triphasic waveforms.     EGD 7/21/16  Impression:          - Normal esophagus.                        - Normal stomach.                        - Normal examined duodenum.     ASSESSMENT and PLAN  Cricket Chen is a 37 year old male scheduled to undergo Repair Right Upper Arm Pseudo Aneurysm on 8/17/18 with Dr. Payton under general anesthesia.     He has the following specific operative considerations:   - METS:  >4. RCRI : Congestive Heart Failure (pulmonary edema, PND, s3 mariana, CXR with pulmonary congestion, basilar rales).  0.9 % risk of major adverse cardiac event.  - JUAN # of risks 2/8 = low risk  - VTE risk:  0.5%  - Risk of PONV score = 1.  If > 2, anti-emetic intervention recommended.  - Post-op delirium risk: low    1.  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  Denies cardiopulmonary symptoms.  He is followed by Dr. Alba with last vist 3/30/18 and considered stable.  Last dose of apixaban 2 days prior to procedure.  Take BB & CCB DOS.  Hold ACE DOS.   2.  Pulmonary - remote limited smoking hx.  3.  Hematology/Oncology - PTLD considered in remission followed closely by Dr. Prather with last visit 8/7/2018.  H/O thrombocytopenia.  Plts 139 (8/10/18).  H/O transfusions (PRBC and Plts) in the past.   4.  GI - H/O hepatorenal syndrome 2/2 ETOH cirrhosis, s/p kidney and liver transplant 5/2016.   Quit drinking ETOH 4/2014.  Take immunosuppression including prednisone as prescribed DOS.    Is on a prednisone taper per nephrology now at 2.5 mg daily.  Consider stress dose steroids DOS.        - h/o esophageal varices, last EGD 7/21 2016 >>> normal esophagus, stomach and duodenum.        - R brachial artery pseudoaneurysm>>>above procedure planned.       - CKD, stage III (Cr 1.46, GFR 54): Suggest that nephrotoxic substances and medications be avoided.  Recommend close monitoring of fluid balance and electrolytes throughout the perioperative period.    5.  Endocrine - Hypothyroidism 2/2 thyroidectomy on 8/7/17 with Dr. West given h/o thyroid cancer.  TSH 0.94 (6/2018)     - Anesthesia considerations:  Refer to PAC assessment in anesthesia records  - INR and T&S today     Arrival time, NPO, shower and medication instructions provided by nursing staff today.  Preparing For Your Surgery handout given.  Patient was discussed with Dr Gagnon. I spent 20 minutes face to face with patient assessing, educating, counseling and/or coordinating care and examining the patient.  Of that 20 minutes, I spent greater than 50% of my time counseling and/or coordinating care.      DARYN Perera CNP  Preoperative Assessment Center  [facility]  Phone: [phone]  Fax: [phone]

## 2018-08-13 NOTE — ANESTHESIA PREPROCEDURE EVALUATION
Anesthesia Evaluation     . Pt has had prior anesthetic. Type: General and MAC    No history of anesthetic complications          ROS/MED HX    ENT/Pulmonary:     (+)JUAN risk factors hypertension, tobacco use, Past use 0.33 packs/day  , . .    Neurologic:  - neg neurologic ROS     Cardiovascular: Comment: H/O transposition of great vessels, s/p surgical intervention as .     (+) hypertension----. Taking blood thinners Pt has received instructions: . . . :. dysrhythmias (H/O ablation ~ and since only one cardioversion 3/7/2018.) a-flutter, . 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  Last drink . S/p Kidney and liver transplant 2016.    (+) liver disease,       Renal/Genitourinary:     (+) chronic renal disease, type: ESRD, Pt does not require dialysis, Pt has history of transplant, date: 2016,       Endo:     (+) thyroid problem hypothyroidism, .      Psychiatric:     (+) psychiatric history (H/O anxiety>>>resolved.)       Infectious Disease:  - neg infectious disease ROS       Malignancy:   (+) Malignancy History of Other  Other CA PTLD Remission status post H/O thyroid cancer, s/p thyroidectomy with Dr. Soto 17.         Other:    (+) No chance of pregnancy no H/O Chronic Pain,                   Physical Exam  Normal systems: cardiovascular and dental    Airway   Mallampati: I  TM distance: >3 FB  Neck ROM: full    Dental     Cardiovascular   Rhythm and rate: regular and normal      Pulmonary    breath sounds clear to auscultation    Other findings: Procedures  EKG (3/30:18): Sinus rhythm, RAD, RBBB, prominent RV forces with repolarization abnormality.         Echocardiogram  3/30/18  CONCLUSIONS:  Patient after atrial switch operation for complete transposition of the great  arteries. Technically difficult study due to poor acoustic windows. No baffle  obstruction or leaks seen. There is moderate right ventricular enlargement.  Single plane right ventricular EF 35-40 %. Normal left ventricular systolic  function. No outflow obstruction. Mild tricuspid regurgitation.    NEELIMA & Cardioversion 3/7/18  Interpretation Summary  Limited study  No intracardiac thrombus.  Systemic ventricle function is moderately reduced. Mild-moderate systemic  ventriclular dilation and hypertrophy.  Mild- moderate systemic atrioventricular valve regurgitation.  Normal subpulmonic ventricle size and function.  No intracardiac shunt as demonstrated by a bubble study  No pericardial effusion.  This study was compared with the study from 11/25/2014.There has been no  change.     EXAMINATION: Ultrasound extremity arterial, venous dialysis access  graft, 7/25/2018 6:04 PM      COMPARISON: 7/8/2016      HISTORY: Arm mass, AV fistula  FINDINGS: There is a 3.8 x 3.3 x 7.0 cm echogenic, heterogeneous  well-circumscribed rounded area in the area of the palpable lump over  the right AC fossa. There is a vessel feeding the inferior aspect of  the lesion with arterial waveforms.      The right brachial artery is patent above and below the antecubital  fossa with normal triphasic waveforms.     EGD 7/21/16  Impression:          - Normal esophagus.                        - Normal stomach.                        - Normal examined duodenum.     For further details of assessment, testing, and physical exam please see H and P completed on same date.           PAC Discussion and Assessment    ASA Classification: 3  Case is suitable for: [place]  Anesthetic techniques and relevant risks discussed:   Invasive monitoring and risk discussed:   Types:   Possibility and Risk of blood transfusion discussed:   NPO instructions given:  "  Additional anesthetic preparation and risks discussed:   Needs early admission to pre-op area:   Other:     PAC Resident/NP Anesthesia Assessment:  Cricket Chen is a 37-year-old male scheduled for Repair Right Upper Arm Pseudo Aneurysm on 8/17/18 with Dr. Payton under general anesthesia.  Mr. 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    The patient was seen by Dr. Payton on 8/2/18 for \"large mass in the right antecubital space near the proximal anastomosis of an aneurysmal AV fistula that was ligated. Ultrasound demonstrates flow in the mass, c/w a pseudoaneurysm.\" The above surgery was recommended.       Mr. Chen presents to PAC and denies any chest pain, SOB, palpitations, syncope, BURNS, orthopnea, or PND.  He reports overall feeling well.  He reports his PTLD is in remission and is followed by Dr. Prather with last visit 8/7/2018 being seen every 6 months.  He would like to proceed with above surgical intervention.      He has the following specific operative considerations:   - METS:  >4. RCRI : Congestive Heart Failure (pulmonary edema, PND, s3 mariana, CXR with pulmonary congestion, basilar rales).  0.9 % risk of major adverse cardiac event.  - JUAN # of risks 2/8 = low risk  - VTE risk:  0.5%  - Risk of PONV score = 1.  If > 2, anti-emetic intervention recommended.  - Post-op delirium risk: low    1.  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  Denies cardiopulmonary symptoms.  He is followed by " Dr. Alba with last vist 3/30/18 and considered stable.  Last dose of apixaban 2 days prior to procedure.  Take BB & CCB DOS.  Hold ACE DOS.   2.  Pulmonary - remote limited smoking hx.  3.  Hematology/Oncology - PTLD considered in remission followed closely by Dr. Prather with last visit 8/7/2018.  H/O thrombocytopenia.  Plts 139 (8/10/18).  H/O transfusions (PRBC and Plts) in the past.   4.  GI - h/o esophageal varices, last EGD 7/21 2016 >>> normal esophagus, stomach and duodenum.         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  Quit drinking ETOH 4/2014.  Take immunosuppression including prednisone as prescribed DOS.  Is on a prednisone taper per nephrology now at 2.5 mg daily.  Consider stress dose steroids DOS.        - R brachial artery pseudoaneurysm>>>above procedure planned.       - CKD, stage III (Cr 1.46, GFR 54): Suggest that nephrotoxic substances and medications be avoided.  Recommend close monitoring of fluid balance and electrolytes throughout the perioperative period.      5.  Endocrine - Hypothyroidism 2/2 thyroidectomy on 8/7/17 with Dr. West given h/o thyroid cancer.  TSH 0.94 (6/2018)     - Anesthesia considerations:  Refer to PAC assessment in anesthesia records  - INR and T&S today     Arrival time, NPO, shower and medication instructions provided by nursing staff today.  Preparing For Your Surgery handout given.  Patient was discussed with Dr Gagnon. I spent 20 minutes face to face with patient assessing, educating, counseling and/or coordinating care and examining the patient.  Of that 20 minutes, I spent greater than 50% of my time counseling and/or coordinating care.           Reviewed and Signed by PAC Mid-Level Provider/Resident  Mid-Level Provider/Resident: Julia STEARNS CNP   Date: 8/13/18  Time: 8:22    Attending Anesthesiologist Anesthesia Assessment:  37 year old for excision of pseudo aneurysm and thrombus in old AV graft that  has been ligated. 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 Not sure why he had atrial not arterial, but latest echo shows nickolas atrial baffles are intact; RV slightly dilated with EF 35% (it is serving the systemic circulation). LV (pulmonary circulation) EF is normal. All valves are normal, no septal defects.     He had ETOH cirrhosis, ESRD and had liver and kidney transplants, then post transplant lymphoproliferative disorder that is in remission; also thyroid cancer, S/P thyroidectomy.    Current doing well, active no symptoms. Liver and renal function stable at this point. His platelet count is slightly down which is likely PTLP disorder, but will check INR just to be sure hepatic synthetic function is good.     Chart reviewed, patient seen and evaluated; agree with above assessment.    Patient is appropriate for the planned procedure without further workup or medical management change. The final anesthesia plan will be determined by the physician anesthesiologist caring for the patient on the day of surgery.      Reviewed and Signed by PAC Anesthesiologist  Anesthesiologist: crystal  Date: 2018  Time:   Pass/Fail: Pass  Disposition:     PAC Pharmacist Assessment:        Pharmacist:   Date:   Time:      Anesthesia Plan      History & Physical Review  History and physical reviewed and following examination; no interval change.    ASA Status:  3 .    NPO Status:  > 8 hours    Plan for General and ETT with Intravenous induction. Maintenance will be Balanced.    PONV prophylaxis:  Dexamethasone or Solumedrol and Ondansetron (or other 5HT-3)       Postoperative Care  Postoperative pain management:  IV analgesics.      Consents  Anesthetic plan, risks, benefits and alternatives discussed with:  Patient..                          .

## 2018-08-13 NOTE — PHARMACY - PREOPERATIVE ASSESSMENT CENTER
Anticoagulation Note - Preoperative Assessment Center (PAC) Pharmacist     Patient seen and interviewed during time of PAC Clinic appointment August 13, 2018.  The purpose of this note is to document the perioperative anticoagulation plan outlined by the providers caring for Cricket Chen.     Current Regimen  Anticoagulation Regimen as of August 13, 2018: Apixaban 5mg by mouth BID   Indication: aflutter  Prescriber:  Dr. George  Expected Duration of therapy: indeinite    Perioperative plan  Cricket Chen is scheduled for Repair Right Upper Arm Pseudo Aneursym  on 8/17/18 with Dr. Payton and the perioperative anticoagulation plan outlined by his surgical team is to hold apixiban 48 hours prior to the procedure with last dose on 8/14/18.   Resumption of anticoagulation after procedure will be based on surgery team assessment of bleeding risks and complications.  This plan may require re-assessment and modification by his primary team in the perioperative setting depending on patients clinical situation.        Sawyer Bowman RPH  August 13, 2018  8:53 AM

## 2018-08-13 NOTE — MR AVS SNAPSHOT
After Visit Summary   2018    Cricket Chen    MRN: [id number]           Patient Information     Date Of Birth          1980        Visit Information        Provider Department      2018 8:30 AM Rn, [facility] Preoperative Assessment Center        Care Instructions    Preparing for Your Surgery      Name:  Cricket Chen   MRN:  [id number]   :  1980   Today's Date:  2018     Arriving for surgery:  Repair Right Upper Arm Pseudo Aneurysm   Surgery date:  2018  Arrival time:  10:30 am  Please come to:       [facility] [address]  [address] [place]  [address]    -   parking is available in front of the hospital from 5:15 am to 8:00 pm    -  Stop at the Information Desk in the lobby    -   Inform the information person that you are here for surgery. An escort to 3c will be provided. If you would not like an escort, please proceed to 3C on the 3rd floor. [phone]     What can I eat or drink?  -  You may have solid food or milk products until 8 hours prior to your surgery. ( 18 at 4 am )  -  You may have water, apple juice or 7up/Sprite until 2 hours prior to your surgery. (until 10:30 am arrival time)    Which medicines can I take?        Stop Aspirin, vitamins and supplements one week prior to surgery.      Hold Ibuprofen and Naproxen for 24 hours prior to surgery.     -  Do NOT take these medications in the morning, the day of surgery:      Lisinopril                      Plan for Eliquis -- Hold for 48 hours before surgery --last dose on 18      -  Please take these medications the day of surgery:      Amlodopine       Levothyroxine      Metoprolol       Mycophenolate       Prednisone      Sulfamethoxazole Trimethoprim       Tacrolimus           How do I prepare myself?  -  Take two showers: one the night before surgery; and one the morning of surgery.         Use Scrubcare or Hibiclens to wash from  neck down.  You may use your own shampoo and conditioner. No other hair products.   -  Do NOT use lotion, powder, deodorant, or antiperspirant the day of your surgery.  -  Do NOT wear any makeup, fingernail polish or jewelry.  - Do not bring your own medications to the hospital, except for inhalers and eye drops.  -  Bring your ID and insurance card.    Questions or Concerns:  -If you have questions or concerns regarding the day of surgery, please call [phone].       -For questions after surgery please call your surgeons office.                     Follow-ups after your visit        Your next 10 appointments already scheduled     Aug 13, 2018  8:30 AM CDT   (Arrive by 8:15 AM)   PAC RN ASSESSMENT with  Pac Rn   [facility] Pre[facility] [place] ([facility])    [address] [place] [id number] [phone]            Aug 13, 2018  9:00 AM CDT   (Arrive by 8:45 AM)   PAC Anesthesia Consult with  Pac Anesthesiologist   [facility] ([facility]    [address] [place] [id number] [phone]            Aug 13, 2018  9:15 AM CDT   (Arrive by 9:00 AM)   PAC Pharmacist with  Pac Pharmacist   [facility] ([facility])    [address] [place] [id number]   [phone]            Aug 17, 2018   Procedure with John Payton MD   [facility], [place], Same Day Surgery (--)    [address] [facility] [id number] [phone]            Aug 21, 2018 11:15 AM CDT   LAB with  LAB   [facility] ([facility])    [address] [facility] [id number] [phone]           Please do not eat 10-12 hours before your appointment if you are coming in fasting for labs on lipids, cholesterol, or glucose (sugar). This does not apply to pregnant women. Water, hot tea and  black coffee (with nothing added) are okay. Do not drink other fluids, diet soda or chew gum.            Oct 18, 2018 12:45 PM CDT   (Arrive by 12:30 PM)   Return Visit with Yuval Dobbins MD   [facility] ([facility])    [address] [place] [address]  3rd Floor  [facility] [address]   [phone]            Jan 08, 2019 12:00 PM CST   CT CHEST ABDOMEN PELVIS W/O & W CONTRAST with UCCT2   [facility] [place] [facility] ([facility])    [address] [facility] [address] [phone]           Please bring any scans or X-rays taken at other hospitals, if similar tests were done. Also bring a list of your medicines, including vitamins, minerals and over-the-counter drugs. It is safest to leave personal items at home.  Be sure to tell your doctor:   If you have any allergies.   If there s any chance you are pregnant.   If you are breastfeeding.  How to prepare:   Do not eat or drink for 2 hours before your exam. If you need to take medicine, you may take it with small sips of water. (We may ask you to take liquid medicine as well.)   Please wear loose clothing, such as a sweat suit or jogging clothes. Avoid snaps, zippers and other metal. We may ask you to undress and put on a hospital gown.  Please arrive 30 minutes early for your CT. Once in the department you might be asked to drink water 15-20 minutes prior to your exam.  If indicated you may be asked to drink an oral contrast in advance of your CT.  If this is the case, the imaging team will let you know or be in contact with you prior to your appointment  Patients over 70 or patients with diabetes or kidney problems:   If you haven t had a blood test (creatinine test) within the last 30 days, the Cardiologist/Radiologist may require you to get this test prior to your exam.  If you have diabetes:   Continue to take your metformin medication on the day of your exam  If you have any  questions, please call the Imaging Department where you will have your exam.            George 15, 2019 11:15 AM CST   [facility] Draw with  [facility] DRAW   [facility])    [address]  [address]  [facility] [place] [id number] [phone]            George 15, 2019 11:45 AM CST   (Arrive by 11:30 AM)   Return Visit with Carter Prather MD   [facility] ([facility])    [address]  [address]  [place] [id number] [phone]            Jan 18, 2019 11:00 AM CST   (Arrive by 10:45 AM)   Return Liver Transplant with Laureen Galvan MD   [facility] ([facility])    [address] [place] [address]  [address]  [facility] [place] [id number] [phone]              Future tests that were ordered for you today     Open Future Orders        Priority Expected Expires Ordered    ABO/Rh type and screen Routine 8/13/2018 9/12/2018 8/13/2018            Who to contact     Please call your clinic at [phone] to:    Ask questions about your health    Make or cancel appointments    Discuss your medicines    Learn about your test results    Speak to your doctor            Additional Information About Your Visit        [employer] Information     [employer] gives you secure access to your electronic health record. If you see a primary care provider, you can also send messages to your care team and make appointments. If you have questions, please call your primary care clinic.  If you do not have a primary care provider, please call [phone] and they will assist you.      [employer] is an electronic gateway that provides easy, online access to your medical records. With [employer], you can request a clinic appointment, read your test results, renew a prescription or communicate with your care team.     To access your existing account, please contact your [facility]inic or call [phone] for assistance.        Care EveryWhere ID     This is your Care EveryWhere ID. This could be used by other organizations to access your [facility] medical records  [id number]         Blood Pressure from Last 3 Encounters:   08/13/18 137/90   08/10/18 130/85   08/07/18 108/72    Weight from Last 3 Encounters:   08/13/18 87.5 kg (192 lb 14.4 oz)   08/10/18 86.8 kg (191 lb 6.4 oz)   08/07/18 87.6 kg (193 lb 1.6 oz)              Today, you had the following     No orders found for display       Primary Care Provider    [facility]       No address on file        Equal Access to Services     CLAY VIDES : Ana M Barroso, jaime brito, mary mccann, robina sibley. So [facility] [phone].    ATENCIÓN: Si habla español, tiene a laura disposición servicios gratuitos de asistencia lingüística. Llame al [phone].    We comply with applicable federal civil rights laws and [place] laws. We do not discriminate on the basis of race, color, national origin, age, disability, sex, sexual orientation, or gender identity.            Thank you!     Thank you for choosing [facility] PREOPERATIVE ASSESSMENT CENTER  for your care. Our goal is always to provide you with excellent care. Hearing back from our patients is one way we can continue to improve our services. Please take a few minutes to complete the written survey that you may receive in the mail after your visit with us. Thank you!             Your Updated Medication List - Protect others around you: Learn how to safely use, store and throw away your medicines at www.disposemymeds.org.          This list is accurate as of 8/13/18  7:55 AM.  Always use your most recent med list.                   Brand Name Dispense Instructions for use Diagnosis    amLODIPine 10 MG tablet    NORVASC    90 tablet    TAKE ONE TABLET BY MOUTH EVERY DAY    HTN (hypertension)       apixaban  ANTICOAGULANT 5 MG tablet    ELIQUIS    180 tablet    Take 1 tablet (5 mg) by mouth 2 times daily    Atrial flutter, unspecified type (H)       * ketoconazole 2 % cream    NIZORAL    60 g    Twice daily to areas of rash on face    Dermatitis, seborrheic       * ketoconazole 2 % shampoo    NIZORAL    120 mL    Use as a foaming face wash in shower daily    Dermatitis, seborrheic       levothyroxine 137 MCG tablet    SYNTHROID/LEVOTHROID    90 tablet    Take 1 tablet (137 mcg) by mouth daily    Papillary thyroid carcinoma (H), Postoperative hypothyroidism       lisinopril 5 MG tablet    PRINIVIL/ZESTRIL    90 tablet    TAKE ONE TABLET BY MOUTH EVERY DAY    HTN (hypertension)       magnesium oxide 400 (241.3 Mg) MG tablet    MAG-OX    120 tablet    Take 1 tablet (400 mg) by mouth 2 times daily    Hypomagnesemia       metoprolol succinate 25 MG 24 hr tablet    TOPROL-XL    90 tablet    Take 1 tablet (25 mg) by mouth daily    Atrial flutter, unspecified type (H)       mycophenolate 250 MG capsule    GENERIC EQUIVALENT    60 capsule    Take 1 capsule (250 mg) by mouth 2 times daily    Kidney replaced by transplant       predniSONE 5 MG tablet    DELTASONE    90 tablet    Take 2.5 mg once daily for 14 days, then stop    Liver replaced by transplant (H), S/P kidney transplant       sulfamethoxazole-trimethoprim 400-80 MG per tablet    BACTRIM/SEPTRA    30 tablet    Take 1 tablet by mouth daily    Liver replaced by transplant (H), S/P kidney transplant       * tacrolimus 1 MG capsule    GENERIC EQUIVALENT    60 capsule    Take 1 capsule (1 mg) by mouth 2 times daily 1.5 mg twice per day.    Liver replaced by transplant (H), S/P kidney transplant       * tacrolimus 0.5 MG capsule    GENERIC EQUIVALENT    60 capsule    Take 1 capsule (0.5 mg) by mouth 2 times daily 1.5 mg twice per day.    S/P kidney transplant       * Notice:  This list has 4 medication(s) that are the same as other medications prescribed for you. Read the  directions carefully, and ask your doctor or other care provider to review them with you.

## 2018-08-13 NOTE — PATIENT INSTRUCTIONS
Preparing for Your Surgery      Name:  Cricket Chen   MRN:  [id number]   :  1980   Today's Date:  2018     Arriving for surgery:  Repair Right Upper Arm Pseudo Aneurysm   Surgery date:  2018  Arrival time:  10:30 am  Please come to:       [facility] [address]  [address] [place]  [address]    -   parking is available in front of the hospital from 5:15 am to 8:00 pm    -  Stop at the Information Desk in the lobby    -   Inform the information person that you are here for surgery. An escort to 3c will be provided. If you would not like an escort, please proceed to 3C on the 3rd floor. [phone]     What can I eat or drink?  -  You may have solid food or milk products until 8 hours prior to your surgery. ( 18 at 4 am )  -  You may have water, apple juice or 7up/Sprite until 2 hours prior to your surgery. (until 10:30 am arrival time)    Which medicines can I take?        Stop Aspirin, vitamins and supplements one week prior to surgery.      Hold Ibuprofen and Naproxen for 24 hours prior to surgery.     -  Do NOT take these medications in the morning, the day of surgery:      Lisinopril                      Plan for Eliquis -- Hold for 48 hours before surgery --last dose on 18      -  Please take these medications the day of surgery:      Amlodopine       Levothyroxine      Metoprolol       Mycophenolate       Prednisone      Sulfamethoxazole Trimethoprim       Tacrolimus           How do I prepare myself?  -  Take two showers: one the night before surgery; and one the morning of surgery.         Use Scrubcare or Hibiclens to wash from neck down.  You may use your own shampoo and conditioner. No other hair products.   -  Do NOT use lotion, powder, deodorant, or antiperspirant the day of your surgery.  -  Do NOT wear any makeup, fingernail polish or jewelry.  - Do not bring your own medications to the hospital, except for inhalers and eye  drops.  -  Bring your ID and insurance card.    Questions or Concerns:  -If you have questions or concerns regarding the day of surgery, please call [phone].       -For questions after surgery please call your surgeons office.

## 2018-08-16 LAB — LAB SCANNED RESULT: NORMAL

## 2018-08-17 ENCOUNTER — HOSPITAL ENCOUNTER (OUTPATIENT)
Facility: CLINIC | Age: 38
Discharge: HOME | End: 2018-08-17
Attending: SURGERY | Admitting: SURGERY
Payer: COMMERCIAL

## 2018-08-17 ENCOUNTER — ANESTHESIA (OUTPATIENT)
Dept: SURGERY | Facility: CLINIC | Age: 38
End: 2018-08-17
Payer: COMMERCIAL

## 2018-08-17 ENCOUNTER — SURGERY (OUTPATIENT)
Age: 38
End: 2018-08-17

## 2018-08-17 VITALS
BODY MASS INDEX: 27.3 KG/M2 | DIASTOLIC BLOOD PRESSURE: 81 MMHG | SYSTOLIC BLOOD PRESSURE: 135 MMHG | TEMPERATURE: 97.6 F | WEIGHT: 190.7 LBS | OXYGEN SATURATION: 100 % | HEIGHT: 70 IN | RESPIRATION RATE: 14 BRPM

## 2018-08-17 DIAGNOSIS — G89.18 POSTOPERATIVE PAIN: Primary | ICD-10-CM

## 2018-08-17 LAB
ABO + RH BLD: NORMAL
ABO + RH BLD: NORMAL
APTT PPP: 52 SEC (ref 22–37)
BLD GP AB SCN SERPL QL: NORMAL
BLOOD BANK CMNT PATIENT-IMP: NORMAL
BLOOD BANK CMNT PATIENT-IMP: NORMAL
GLUCOSE BLDC GLUCOMTR-MCNC: 96 MG/DL (ref 70–99)
INR PPP: 1.08 (ref 0.86–1.14)
SPECIMEN EXP DATE BLD: NORMAL

## 2018-08-17 PROCEDURE — 25000128 H RX IP 250 OP 636: Performed by: SURGERY

## 2018-08-17 PROCEDURE — 40000170 ZZH STATISTIC PRE-PROCEDURE ASSESSMENT II: Performed by: SURGERY

## 2018-08-17 PROCEDURE — 71000014 ZZH RECOVERY PHASE 1 LEVEL 2 FIRST HR: Performed by: SURGERY

## 2018-08-17 PROCEDURE — 25000128 H RX IP 250 OP 636: Performed by: NURSE ANESTHETIST, CERTIFIED REGISTERED

## 2018-08-17 PROCEDURE — 37000008 ZZH ANESTHESIA TECHNICAL FEE, 1ST 30 MIN: Performed by: SURGERY

## 2018-08-17 PROCEDURE — 88304 TISSUE EXAM BY PATHOLOGIST: CPT | Performed by: SURGERY

## 2018-08-17 PROCEDURE — 36000062 ZZH SURGERY LEVEL 4 1ST 30 MIN - UMMC: Performed by: SURGERY

## 2018-08-17 PROCEDURE — 37000009 ZZH ANESTHESIA TECHNICAL FEE, EACH ADDTL 15 MIN: Performed by: SURGERY

## 2018-08-17 PROCEDURE — 25000128 H RX IP 250 OP 636: Performed by: ANESTHESIOLOGY

## 2018-08-17 PROCEDURE — 27210794 ZZH OR GENERAL SUPPLY STERILE: Performed by: SURGERY

## 2018-08-17 PROCEDURE — 71000027 ZZH RECOVERY PHASE 2 EACH 15 MINS: Performed by: SURGERY

## 2018-08-17 PROCEDURE — 85610 PROTHROMBIN TIME: CPT | Performed by: SURGERY

## 2018-08-17 PROCEDURE — 25000132 ZZH RX MED GY IP 250 OP 250 PS 637

## 2018-08-17 PROCEDURE — 25000125 ZZHC RX 250: Performed by: NURSE ANESTHETIST, CERTIFIED REGISTERED

## 2018-08-17 PROCEDURE — 25000565 ZZH ISOFLURANE, EA 15 MIN: Performed by: SURGERY

## 2018-08-17 PROCEDURE — 85730 THROMBOPLASTIN TIME PARTIAL: CPT | Performed by: SURGERY

## 2018-08-17 PROCEDURE — 82962 GLUCOSE BLOOD TEST: CPT

## 2018-08-17 PROCEDURE — C9399 UNCLASSIFIED DRUGS OR BIOLOG: HCPCS | Performed by: NURSE ANESTHETIST, CERTIFIED REGISTERED

## 2018-08-17 PROCEDURE — 36000064 ZZH SURGERY LEVEL 4 EA 15 ADDTL MIN - UMMC: Performed by: SURGERY

## 2018-08-17 RX ORDER — LIDOCAINE 40 MG/G
CREAM TOPICAL
Status: DISCONTINUED | OUTPATIENT
Start: 2018-08-17 | End: 2018-08-17 | Stop reason: HOSPADM

## 2018-08-17 RX ORDER — LABETALOL HYDROCHLORIDE 5 MG/ML
10 INJECTION, SOLUTION INTRAVENOUS
Status: DISCONTINUED | OUTPATIENT
Start: 2018-08-17 | End: 2018-08-17 | Stop reason: HOSPADM

## 2018-08-17 RX ORDER — FENTANYL CITRATE 50 UG/ML
INJECTION, SOLUTION INTRAMUSCULAR; INTRAVENOUS PRN
Status: DISCONTINUED | OUTPATIENT
Start: 2018-08-17 | End: 2018-08-17

## 2018-08-17 RX ORDER — FENTANYL CITRATE 50 UG/ML
25-50 INJECTION, SOLUTION INTRAMUSCULAR; INTRAVENOUS
Status: DISCONTINUED | OUTPATIENT
Start: 2018-08-17 | End: 2018-08-17 | Stop reason: HOSPADM

## 2018-08-17 RX ORDER — EPHEDRINE SULFATE 50 MG/ML
INJECTION, SOLUTION INTRAMUSCULAR; INTRAVENOUS; SUBCUTANEOUS PRN
Status: DISCONTINUED | OUTPATIENT
Start: 2018-08-17 | End: 2018-08-17

## 2018-08-17 RX ORDER — HYDRALAZINE HYDROCHLORIDE 20 MG/ML
2.5-5 INJECTION INTRAMUSCULAR; INTRAVENOUS EVERY 10 MIN PRN
Status: DISCONTINUED | OUTPATIENT
Start: 2018-08-17 | End: 2018-08-17 | Stop reason: HOSPADM

## 2018-08-17 RX ORDER — OXYCODONE HYDROCHLORIDE 5 MG/1
5 TABLET ORAL
Status: COMPLETED | OUTPATIENT
Start: 2018-08-17 | End: 2018-08-17

## 2018-08-17 RX ORDER — OXYCODONE AND ACETAMINOPHEN 5; 325 MG/1; MG/1
1 TABLET ORAL EVERY 4 HOURS PRN
Qty: 10 TABLET | Refills: 0 | Status: SHIPPED | OUTPATIENT
Start: 2018-08-17 | End: 2019-01-16

## 2018-08-17 RX ORDER — ONDANSETRON 4 MG/1
4 TABLET, ORALLY DISINTEGRATING ORAL EVERY 30 MIN PRN
Status: DISCONTINUED | OUTPATIENT
Start: 2018-08-17 | End: 2018-08-17 | Stop reason: HOSPADM

## 2018-08-17 RX ORDER — SODIUM CHLORIDE, SODIUM LACTATE, POTASSIUM CHLORIDE, CALCIUM CHLORIDE 600; 310; 30; 20 MG/100ML; MG/100ML; MG/100ML; MG/100ML
INJECTION, SOLUTION INTRAVENOUS CONTINUOUS
Status: DISCONTINUED | OUTPATIENT
Start: 2018-08-17 | End: 2018-08-17 | Stop reason: HOSPADM

## 2018-08-17 RX ORDER — HEPARIN SODIUM 1000 [USP'U]/ML
INJECTION, SOLUTION INTRAVENOUS; SUBCUTANEOUS PRN
Status: DISCONTINUED | OUTPATIENT
Start: 2018-08-17 | End: 2018-08-17 | Stop reason: HOSPADM

## 2018-08-17 RX ORDER — ACETAMINOPHEN 325 MG/1
650 TABLET ORAL
Status: DISCONTINUED | OUTPATIENT
Start: 2018-08-17 | End: 2018-08-17 | Stop reason: HOSPADM

## 2018-08-17 RX ORDER — SODIUM CHLORIDE 9 MG/ML
INJECTION, SOLUTION INTRAVENOUS CONTINUOUS PRN
Status: DISCONTINUED | OUTPATIENT
Start: 2018-08-17 | End: 2018-08-17

## 2018-08-17 RX ORDER — NALOXONE HYDROCHLORIDE 0.4 MG/ML
.1-.4 INJECTION, SOLUTION INTRAMUSCULAR; INTRAVENOUS; SUBCUTANEOUS
Status: CANCELLED | OUTPATIENT
Start: 2018-08-17 | End: 2018-08-18

## 2018-08-17 RX ORDER — CEFAZOLIN SODIUM 1 G/3ML
1 INJECTION, POWDER, FOR SOLUTION INTRAMUSCULAR; INTRAVENOUS ONCE
Status: COMPLETED | OUTPATIENT
Start: 2018-08-17 | End: 2018-08-17

## 2018-08-17 RX ORDER — ONDANSETRON 2 MG/ML
4 INJECTION INTRAMUSCULAR; INTRAVENOUS EVERY 30 MIN PRN
Status: DISCONTINUED | OUTPATIENT
Start: 2018-08-17 | End: 2018-08-17 | Stop reason: HOSPADM

## 2018-08-17 RX ORDER — DEXAMETHASONE SODIUM PHOSPHATE 4 MG/ML
INJECTION, SOLUTION INTRA-ARTICULAR; INTRALESIONAL; INTRAMUSCULAR; INTRAVENOUS; SOFT TISSUE PRN
Status: DISCONTINUED | OUTPATIENT
Start: 2018-08-17 | End: 2018-08-17

## 2018-08-17 RX ORDER — PROPOFOL 10 MG/ML
INJECTION, EMULSION INTRAVENOUS PRN
Status: DISCONTINUED | OUTPATIENT
Start: 2018-08-17 | End: 2018-08-17

## 2018-08-17 RX ORDER — ONDANSETRON 2 MG/ML
INJECTION INTRAMUSCULAR; INTRAVENOUS PRN
Status: DISCONTINUED | OUTPATIENT
Start: 2018-08-17 | End: 2018-08-17

## 2018-08-17 RX ADMIN — HEPARIN SODIUM 5000 UNITS: 1000 INJECTION, SOLUTION INTRAVENOUS; SUBCUTANEOUS at 12:39

## 2018-08-17 RX ADMIN — DEXAMETHASONE SODIUM PHOSPHATE 8 MG: 4 INJECTION, SOLUTION INTRA-ARTICULAR; INTRALESIONAL; INTRAMUSCULAR; INTRAVENOUS; SOFT TISSUE at 11:55

## 2018-08-17 RX ADMIN — ONDANSETRON 4 MG: 2 INJECTION INTRAMUSCULAR; INTRAVENOUS at 12:47

## 2018-08-17 RX ADMIN — ACETAMINOPHEN 650 MG: 325 TABLET, FILM COATED ORAL at 14:13

## 2018-08-17 RX ADMIN — FENTANYL CITRATE 50 MCG: 50 INJECTION, SOLUTION INTRAMUSCULAR; INTRAVENOUS at 11:35

## 2018-08-17 RX ADMIN — SODIUM CHLORIDE: 9 INJECTION, SOLUTION INTRAVENOUS at 11:33

## 2018-08-17 RX ADMIN — Medication 5 MG: at 12:28

## 2018-08-17 RX ADMIN — PROPOFOL 150 MG: 10 INJECTION, EMULSION INTRAVENOUS at 11:46

## 2018-08-17 RX ADMIN — FENTANYL CITRATE 50 MCG: 50 INJECTION, SOLUTION INTRAMUSCULAR; INTRAVENOUS at 13:51

## 2018-08-17 RX ADMIN — FENTANYL CITRATE 100 MCG: 50 INJECTION, SOLUTION INTRAMUSCULAR; INTRAVENOUS at 12:09

## 2018-08-17 RX ADMIN — SUGAMMADEX 200 MG: 100 INJECTION, SOLUTION INTRAVENOUS at 13:02

## 2018-08-17 RX ADMIN — MIDAZOLAM 1 MG: 1 INJECTION INTRAMUSCULAR; INTRAVENOUS at 11:36

## 2018-08-17 RX ADMIN — MIDAZOLAM 1 MG: 1 INJECTION INTRAMUSCULAR; INTRAVENOUS at 11:37

## 2018-08-17 RX ADMIN — OXYCODONE HYDROCHLORIDE 5 MG: 5 TABLET ORAL at 14:13

## 2018-08-17 RX ADMIN — ROCURONIUM BROMIDE 40 MG: 10 INJECTION INTRAVENOUS at 11:46

## 2018-08-17 RX ADMIN — CEFAZOLIN 2 G: 1 INJECTION, POWDER, FOR SOLUTION INTRAMUSCULAR; INTRAVENOUS at 11:55

## 2018-08-17 RX ADMIN — FENTANYL CITRATE 25 MCG: 50 INJECTION, SOLUTION INTRAMUSCULAR; INTRAVENOUS at 13:13

## 2018-08-17 RX ADMIN — FENTANYL CITRATE 50 MCG: 50 INJECTION, SOLUTION INTRAMUSCULAR; INTRAVENOUS at 13:44

## 2018-08-17 ASSESSMENT — PAIN DESCRIPTION - DESCRIPTORS: DESCRIPTORS: ACHING

## 2018-08-17 NOTE — IP AVS SNAPSHOT
Post Anesthesia Care Unit [address] [address]    Phone:  [phone]                                       After Visit Summary   8/17/2018    Cricket Chen    MRN: [id number]           After Visit Summary Signature Page     I have received my discharge instructions, and my questions have been answered. I have discussed any challenges I see with this plan with the nurse or doctor.    ..........................................................................................................................................  Patient/Patient Representative Signature      ..........................................................................................................................................  Patient Representative Print Name and Relationship to Patient    ..................................................               ................................................  Date                                            Time    ..........................................................................................................................................  Reviewed by Signature/Title    ...................................................              ..............................................  Date                                                            Time

## 2018-08-17 NOTE — BRIEF OP NOTE
[facility], [place]    Brief Operative Note    Pre-operative diagnosis: Pseudo Aneursym   Post-operative diagnosis * No post-op diagnosis entered *  Procedure: Procedure(s):  Repair Right Upper Arm Pseudo Aneursym  - Wound Class: I-Clean  Surgeon: Surgeon(s) and Role:     * John Payton MD - Primary     * Sobeida Norwood MD - Assisting  Anesthesia: General   Estimated blood loss: Less than 10 ml  Drains: None  Specimens:   ID Type Source Tests Collected by Time Destination   A : right antecubital aneurysm Tissue Other SURGICAL PATHOLOGY EXAM John Payton MD 8/17/2018 12:37 PM      Findings:   No arterial fistula  Complications: None.  Implants: None.

## 2018-08-17 NOTE — DISCHARGE INSTRUCTIONS
[facility]  Same-Day Surgery   Adult Discharge Orders & Instructions     For 24 hours after surgery    1. Get plenty of rest.  A responsible adult must stay with you for at least 24 hours after you leave the hospital.   2. Do not drive or use heavy equipment.  If you have weakness or tingling, don't drive or use heavy equipment until this feeling goes away.  3. Do not drink alcohol.  4. Avoid strenuous or risky activities.  Ask for help when climbing stairs.   5. You may feel lightheaded.  IF so, sit for a few minutes before standing.  Have someone help you get up.   6. If you have nausea (feel sick to your stomach): Drink only clear liquids such as apple juice, ginger ale, broth or 7-Up.  Rest may also help.  Be sure to drink enough fluids.  Move to a regular diet as you feel able.  7. You may have a slight fever. Call the doctor if your fever is over 100 F (37.7 C) (taken under the tongue) or lasts longer than 24 hours.  8. You may have a dry mouth, a sore throat, muscle aches or trouble sleeping.  These should go away after 24 hours.  9. Do not make important or legal decisions.   Call your doctor for any of the followin.  Signs of infection (fever, growing tenderness at the surgery site, a large amount of drainage or bleeding, severe pain, foul-smelling drainage, redness, swelling).    2. It has been over 8 to 10 hours since surgery and you are still not able to urinate (pass water).    3.  Headache for over 24 hours.    To contact a doctor, call Dr. JESSICA Payton at [phone] at the [facility] or:    x   [phone] and ask for the resident on call for   Vascular Surgery (answered 24 hours a day)  x   Emergency Department:    [facility]: [phone]       (TTY for hearing impaired: [phone])             Tips for taking pain medications  To get the best pain relief possible , remember these points:      Take pain medications as  directed, before pain becomes severe      Pain medication can upset your stomach: taking it with food may help      Constipation is a common side effect of pain medication. Drink plenty of  Fluids      Eat foods high in fiber. Take a stool softener  if recommended by your doctor or  Pharmacist.        Do not drink alcohol, drive or operate machinery while taking pain medications.      Ask about other ways to control pain, such as with heat, ice or relaxation.

## 2018-08-17 NOTE — OR NURSING
texted Sobeida Norwood MD to call mother and update her Isidro does not remember that they were at the bedside

## 2018-08-17 NOTE — IP AVS SNAPSHOT
MRN:[id number]                      After Visit Summary   8/17/2018    Cricket Chen    MRN: [id number]           Thank you!     Thank you for choosing [facility] for your care. Our goal is always to provide you with excellent care. Hearing back from our patients is one way we can continue to improve our services. Please take a few minutes to complete the written survey that you may receive in the mail after you visit with us. Thank you!        Patient Information     Date Of Birth          1980        About your hospital stay     You were admitted on:  August 17, 2018 You last received care in the:  Post Anesthesia Care Unit [facility]    You were discharged on:  August 17, 2018       Who to Call     For medical emergencies, please call 911.  For non-urgent questions about your medical care, please call your primary care provider or clinic, None  For questions related to your surgery, please call your surgery clinic        Attending Provider     Provider oJhn Patrick MD Vascular Surgery       Primary Care Provider    [facility]      After Care Instructions     Diet Instructions       Resume pre-procedure diet            Discharge Instructions       For vascular surgery follow-up care, you should see one of the vascular surgery advanced practice providers (ESTUARDO) in the outpatient clinic in 2 weeks.     You should see Dr. Berry in the outpatient clinic in  4 weeks for routine follow-up to check your progress.     With general vascular surgery questions, concerns, or to request/change an appointment, please call either:    Beth Mcclelland, Care Coordinator RN, Vascular Surgery  [phone]  Or   Vascular Call Center  [phone]    To contact someone after 5 pm, on a weekend, or on a Holiday, please call:  [facility]  [phone], option 4 to have a member of the Vascular Surgery Service paged.            No  driving or operating machinery        While experiencing pain or on pain medications            No lifting        No lifting over 10 lbs and no strenuous physical activity for 2 weeks            Shower       No shower for 24 hours post procedure. May shower Postoperative Day (POD) 1 after removing your wrap. Pat your incision dry with a clean towel.                  Your next 10 appointments already scheduled     Aug 21, 2018 11:15 AM CDT   LAB with [facility] LAB   [facility] ([facility])    [address] [address]   [phone]           Please do not eat 10-12 hours before your appointment if you are coming in fasting for labs on lipids, cholesterol, or glucose (sugar). This does not apply to pregnant women. Water, hot tea and black coffee (with nothing added) are okay. Do not drink other fluids, diet soda or chew gum.            Oct 18, 2018 12:45 PM CDT   (Arrive by 12:30 PM)   Return Visit with Yuval Dobbins MD   [facility] D[facility]at[facility] ([facility])    [address] [place] [id number] [phone]            Jan 08, 2019 12:00 PM CST   CT CHEST ABDOMEN PELVIS W/O & W CONTRAST with UCCT2   [facility] CT ([facility])    [address] [place] [id number] [phone]           Please bring any scans or X-rays taken at other hospitals, if similar tests were done. Also bring a list of your medicines, including vitamins, minerals and over-the-counter drugs. It is safest to leave personal items at home.  Be sure to tell your doctor:   If you have any allergies.   If there s any chance you are pregnant.   If you are breastfeeding.  How to prepare:   Do not eat or drink for 2 hours before your exam. If you need to take medicine, you may take it with small sips of water. (We may ask you to take liquid medicine as well.)   Please wear loose clothing, such as a  sweat suit or jogging clothes. Avoid snaps, zippers and other metal. We may ask you to undress and put on a hospital gown.  Please arrive 30 minutes early for your CT. Once in the department you might be asked to drink water 15-20 minutes prior to your exam.  If indicated you may be asked to drink an oral contrast in advance of your CT.  If this is the case, the imaging team will let you know or be in contact with you prior to your appointment  Patients over 70 or patients with diabetes or kidney problems:   If you haven t had a blood test (creatinine test) within the last 30 days, the Cardiologist/Radiologist may require you to get this test prior to your exam.  If you have diabetes:   Continue to take your metformin medication on the day of your exam  If you have any questions, please call the Imaging Department where you will have your exam.            George 15, 2019 11:15 AM CST   [facility] Draw with  [facility] LAB DRAW   [facility] Lab Draw ([facility])    [address] [place] [address]  [address]  [facility] [address]   [phone]            George 15, 2019 11:45 AM CST   (Arrive by 11:30 AM)   Return Visit with Carter Prather MD   [facility] ([facility])    [address] [place] [address]  [address]  [facility] [address]   [phone]            Jan 18, 2019 11:00 AM CST   (Arrive by 10:45 AM)   Return Liver Transplant with Laureen Galvan MD   [facility] Hepatology ([facility])    [address] [place] [address]  [address]  [facility] [address]   [phone]            Feb 12, 2019  2:20 PM CST   (Arrive by 1:50 PM)   Return Kidney Transplant with  Kidney/Pancreas Recipient   [facility] Nephrology ([facility])    [address] [place] [address]  [address]  [facility] [address]   [phone]              Further instructions from your care team       [facility]  [place]  Same-Day Surgery   Adult Discharge Orders & Instructions     For 24 hours after surgery    1. Get plenty of rest.  A responsible adult must stay with you for at least 24 hours after you leave the hospital.   2. Do not drive or use heavy equipment.  If you have weakness or tingling, don't drive or use heavy equipment until this feeling goes away.  3. Do not drink alcohol.  4. Avoid strenuous or risky activities.  Ask for help when climbing stairs.   5. You may feel lightheaded.  IF so, sit for a few minutes before standing.  Have someone help you get up.   6. If you have nausea (feel sick to your stomach): Drink only clear liquids such as apple juice, ginger ale, broth or 7-Up.  Rest may also help.  Be sure to drink enough fluids.  Move to a regular diet as you feel able.  7. You may have a slight fever. Call the doctor if your fever is over 100 F (37.7 C) (taken under the tongue) or lasts longer than 24 hours.  8. You may have a dry mouth, a sore throat, muscle aches or trouble sleeping.  These should go away after 24 hours.  9. Do not make important or legal decisions.   Call your doctor for any of the followin.  Signs of infection (fever, growing tenderness at the surgery site, a large amount of drainage or bleeding, severe pain, foul-smelling drainage, redness, swelling).    2. It has been over 8 to 10 hours since surgery and you are still not able to urinate (pass water).    3.  Headache for over 24 hours.    To contact a doctor, call Dr. JESSICA Payton at [phone] at the Surgery [facility]ascular Clinic or:    x   [phone] and ask for the resident on call for   Vascular Surgery (answered 24 hours a day)  x   Emergency Department:    [facility]: [phone]       (TTY for hearing impaired: [phone])             Tips for taking pain medications  To get the best pain relief possible , remember these points:      Take pain medications as directed, before pain becomes severe      Pain  "medication can upset your stomach: taking it with food may help      Constipation is a common side effect of pain medication. Drink plenty of  Fluids      Eat foods high in fiber. Take a stool softener  if recommended by your doctor or  Pharmacist.        Do not drink alcohol, drive or operate machinery while taking pain medications.      Ask about other ways to control pain, such as with heat, ice or relaxation.              Pending Results     Date and Time Order Name Status Description    8/17/2018 1239 Surgical pathology exam In process             Admission Information     Date & Time Provider Department Dept. Phone    8/17/2018 John Payton MD Post Anesthesia Care Unit [facility] [phone]      Your Vitals Were     Blood Pressure Temperature Respirations Height Weight Pulse Oximetry    135/81 97  F (36.1  C) 16 1.778 m (5' 10\") 86.5 kg (190 lb 11.2 oz) 99%    BMI (Body Mass Index)                   27.36 kg/m2           MyChart Information     [employer] gives you secure access to your electronic health record. If you see a primary care provider, you can also send messages to your care team and make appointments. If you have questions, please call your primary care clinic.  If you do not have a primary care provider, please call [phone] and they will assist you.        Care EveryWhere ID     This is your Care EveryWhere ID. This could be used by other organizations to access your [facility] medical records  [id number]        Equal Access to Services     CLAY VIDES : Hadfrederick interiano Sosalty, waaxda luqadaha, qaybta kaalmada ramy, robina sibley. So [facility] [phone].    ATENCIÓN: Si habla español, tiene a laura disposición servicios gratuitos de asistencia lingüística. Llame al [phone].    We comply with applicable federal civil rights laws and [place] laws. We do not discriminate on the basis of race, color, national origin, age, disability, sex, " sexual orientation, or gender identity.               Review of your medicines      START taking        Dose / Directions    oxyCODONE-acetaminophen 5-325 MG per tablet   Commonly known as:  PERCOCET   Used for:  Postoperative pain        Dose:  1 tablet   Take 1 tablet by mouth every 4 hours as needed for pain (moderate to severe)   Quantity:  10 tablet   Refills:  0         CONTINUE these medicines which have NOT CHANGED        Dose / Directions    amLODIPine 10 MG tablet   Commonly known as:  NORVASC   Used for:  HTN (hypertension)        TAKE ONE TABLET BY MOUTH EVERY DAY   Quantity:  90 tablet   Refills:  3       apixaban ANTICOAGULANT 5 MG tablet   Commonly known as:  ELIQUIS   Used for:  Atrial flutter, unspecified type (H)        Dose:  5 mg   Take 1 tablet (5 mg) by mouth 2 times daily   Quantity:  180 tablet   Refills:  3       * ketoconazole 2 % cream   Commonly known as:  NIZORAL   Used for:  Dermatitis, seborrheic        Twice daily to areas of rash on face   Quantity:  60 g   Refills:  1       * ketoconazole 2 % shampoo   Commonly known as:  NIZORAL   Used for:  Dermatitis, seborrheic        Use as a foaming face wash in shower daily   Quantity:  120 mL   Refills:  3       levothyroxine 137 MCG tablet   Commonly known as:  SYNTHROID/LEVOTHROID   Used for:  Papillary thyroid carcinoma (H), Postoperative hypothyroidism        Take 1 tablet by mouth Monday - Saturday  and 1.5 tablets on Sunday   Quantity:  96 tablet   Refills:  3       lisinopril 5 MG tablet   Commonly known as:  PRINIVIL/ZESTRIL   Used for:  HTN (hypertension)        TAKE ONE TABLET BY MOUTH EVERY DAY   Quantity:  90 tablet   Refills:  3       magnesium oxide 400 (241.3 Mg) MG tablet   Commonly known as:  MAG-OX   Used for:  Hypomagnesemia        Dose:  400 mg   Take 1 tablet (400 mg) by mouth 2 times daily   Quantity:  120 tablet   Refills:  11       metoprolol succinate 25 MG 24 hr tablet   Commonly known as:  TOPROL-XL   Used for:   Atrial flutter, unspecified type (H)        Dose:  25 mg   Take 1 tablet (25 mg) by mouth daily   Quantity:  90 tablet   Refills:  3       mycophenolate 250 MG capsule   Commonly known as:  GENERIC EQUIVALENT   Used for:  Kidney replaced by transplant        Dose:  250 mg   Take 1 capsule (250 mg) by mouth 2 times daily   Quantity:  60 capsule   Refills:  11       predniSONE 5 MG tablet   Commonly known as:  DELTASONE   Used for:  Liver replaced by transplant (H), S/P kidney transplant        Take 2.5 mg once daily for 14 days, then stop   Quantity:  90 tablet   Refills:  3       sulfamethoxazole-trimethoprim 400-80 MG per tablet   Commonly known as:  BACTRIM/SEPTRA   Indication:  PCP Prophylaxis   Used for:  Liver replaced by transplant (H), S/P kidney transplant        Dose:  1 tablet   Take 1 tablet by mouth daily   Quantity:  30 tablet   Refills:  11       * tacrolimus 1 MG capsule   Commonly known as:  GENERIC EQUIVALENT   Used for:  Liver replaced by transplant (H), S/P kidney transplant        Dose:  1 mg   Take 1 capsule (1 mg) by mouth 2 times daily 1.5 mg twice per day.   Quantity:  60 capsule   Refills:  5       * tacrolimus 0.5 MG capsule   Commonly known as:  GENERIC EQUIVALENT   Used for:  S/P kidney transplant        TAKE ONE CAPSULE BY MOUTH TWICE A DAY. (TOTAL DAILY DOSE IS 1.5MG TWO TIMES A DAY).   Quantity:  60 capsule   Refills:  5       * Notice:  This list has 4 medication(s) that are the same as other medications prescribed for you. Read the directions carefully, and ask your doctor or other care provider to review them with you.         Where to get your medicines      Some of these will need a paper prescription and others can be bought over the counter. Ask your nurse if you have questions.     Bring a paper prescription for each of these medications     oxyCODONE-acetaminophen 5-325 MG per tablet                Protect others around you: Learn how to safely use, store and throw away your  medicines at www.disposemymeds.org.        Information about OPIOIDS     PRESCRIPTION OPIOIDS: WHAT YOU NEED TO KNOW   We gave you an opioid (narcotic) pain medicine. It is important to manage your pain, but opioids are not always the best choice. You should first try all the other options your care team gave you. Take this medicine for as short a time (and as few doses) as possible.    Some activities can increase your pain, such as bandage changes or therapy sessions. It may help to take your pain medicine 30 to 60 minutes before these activities. Reduce your stress by getting enough sleep, working on hobbies you enjoy and practicing relaxation or meditation. Talk to your care team about ways to manage your pain beyond prescription opioids.    These medicines have risks:    DO NOT drive when on new or higher doses of pain medicine. These medicines can affect your alertness and reaction times, and you could be arrested for driving under the influence (DUI). If you need to use opioids long-term, talk to your care team about driving.    DO NOT operate heavy machinery    DO NOT do any other dangerous activities while taking these medicines.    DO NOT drink any alcohol while taking these medicines.     If the opioid prescribed includes acetaminophen, DO NOT take with any other medicines that contain acetaminophen. Read all labels carefully. Look for the word  acetaminophen  or  Tylenol.  Ask your pharmacist if you have questions or are unsure.    You can get addicted to pain medicines, especially if you have a history of addiction (chemical, alcohol or substance dependence). Talk to your care team about ways to reduce this risk.    All opioids tend to cause constipation. Drink plenty of water and eat foods that have a lot of fiber, such as fruits, vegetables, prune juice, apple juice and high-fiber cereal. Take a laxative (Miralax, milk of magnesia, Colace, Senna) if you don t move your bowels at least every other day.  Other side effects include upset stomach, sleepiness, dizziness, throwing up, tolerance (needing more of the medicine to have the same effect), physical dependence and slowed breathing.    Store your pills in a secure place, locked if possible. We will not replace any lost or stolen medicine. If you don t finish your medicine, please throw away (dispose) as directed by your pharmacist. The [place] Pollution Control Agency has more information about safe disposal: https://www.pca.[facility].mn.us/living-green/managing-unwanted-medications             Medication List: This is a list of all your medications and when to take them. Check marks below indicate your daily home schedule. Keep this list as a reference.      Medications           Morning Afternoon Evening Bedtime As Needed    amLODIPine 10 MG tablet   Commonly known as:  NORVASC   TAKE ONE TABLET BY MOUTH EVERY DAY                                apixaban ANTICOAGULANT 5 MG tablet   Commonly known as:  ELIQUIS   Take 1 tablet (5 mg) by mouth 2 times daily                                * ketoconazole 2 % cream   Commonly known as:  NIZORAL   Twice daily to areas of rash on face                                * ketoconazole 2 % shampoo   Commonly known as:  NIZORAL   Use as a foaming face wash in shower daily                                levothyroxine 137 MCG tablet   Commonly known as:  SYNTHROID/LEVOTHROID   Take 1 tablet by mouth Monday - Saturday  and 1.5 tablets on Sunday                                lisinopril 5 MG tablet   Commonly known as:  PRINIVIL/ZESTRIL   TAKE ONE TABLET BY MOUTH EVERY DAY                                magnesium oxide 400 (241.3 Mg) MG tablet   Commonly known as:  MAG-OX   Take 1 tablet (400 mg) by mouth 2 times daily                                metoprolol succinate 25 MG 24 hr tablet   Commonly known as:  TOPROL-XL   Take 1 tablet (25 mg) by mouth daily                                mycophenolate 250 MG capsule   Commonly  known as:  GENERIC EQUIVALENT   Take 1 capsule (250 mg) by mouth 2 times daily                                oxyCODONE-acetaminophen 5-325 MG per tablet   Commonly known as:  PERCOCET   Take 1 tablet by mouth every 4 hours as needed for pain (moderate to severe)                                predniSONE 5 MG tablet   Commonly known as:  DELTASONE   Take 2.5 mg once daily for 14 days, then stop                                sulfamethoxazole-trimethoprim 400-80 MG per tablet   Commonly known as:  BACTRIM/SEPTRA   Take 1 tablet by mouth daily                                * tacrolimus 1 MG capsule   Commonly known as:  GENERIC EQUIVALENT   Take 1 capsule (1 mg) by mouth 2 times daily 1.5 mg twice per day.                                * tacrolimus 0.5 MG capsule   Commonly known as:  GENERIC EQUIVALENT   TAKE ONE CAPSULE BY MOUTH TWICE A DAY. (TOTAL DAILY DOSE IS 1.5MG TWO TIMES A DAY).                                * Notice:  This list has 4 medication(s) that are the same as other medications prescribed for you. Read the directions carefully, and ask your doctor or other care provider to review them with you.

## 2018-08-17 NOTE — OP NOTE
Preop Dx: Right brachial artery pseudoaneurysm    Postop Dx: Thrombosed right brachial artery pseudoaneurysm    Procedure: Excision of right brachial artery pseudoaneurysm    Surgeon; JESSICA Payton MD    Assistant: Sobeida Norwood MD    Anesthesia: GETT    Complications: None    EBL < 50 ml    Indications:  This 37 year old man has a past medical history significant for hepatorenal syndrome secondary to alcoholic cirrhosis and is s/p liver/kidney transplant. He had a long-term RUE brachiocephalic fistula that was ligated.after the  Transplant, but he continued to have a large lump in the right antecubital fossa. A recent ultrasound demonstrated residual flow in the mass suggestive of a pseudoaneurysm. He was consented for repair of the pseudoaneurysm and possible arterial repair.    Findings: 8 X 4 cm thrombosed pseudoaneurysm excise from the antecubital fossa. The remaining vein segment in the lateral side of the incision was patent and appeared to provide the residual flow to the mass. The artery was identified in the medial wound and was not involved.    Description of operation: The patient was brought top the operating room and placed in the supine position. After a satisfactory level of general anesthesia was attained, the patient's right upper extremity and anterior right thigh were  prepped and draped in the usual sterile fashion. A time out was called. A transverse incision was made in the right antecubital scar. The wound was deepened to the level of the mass, which was carefully excised by sharply dividing subcutaneous attachments. In the lateral wound, a large feeding vein was identified. This was clamped and divided, and the remaining end was ligated with a 2-0 silk suture. The brachial artery was identified in the medial wound, and the mass was carefully excised away from the artery, which did not appear to be attached to the mass. After the mass was completely excised and removed from the field,  hemostasis was obtained with electrocautery. After meticulous hemostasis was assured, the wound was closed in layers using 2-0 Vicryl sutures for the deep layers and 4-0 Monocryl for the skin. Dermabond was applied as a dressing and the patient was transported to the PACU in good condition. I was present, scrubbed and supervised all key and critical portions of this case.    JESSICA Pyaton MD  Professor of Surgery  Division of Vascular Surgery

## 2018-08-17 NOTE — ANESTHESIA POSTPROCEDURE EVALUATION
Patient: Cricket Chen    Procedure(s):  Repair Right Upper Arm Pseudo Aneursym  - Wound Class: I-Clean    Diagnosis:Pseudo Aneursym   Diagnosis Additional Information: No value filed.    Anesthesia Type:  General, ETT    Note:  Anesthesia Post Evaluation    Patient location during evaluation: PACU  Patient participation: Able to fully participate in evaluation  Level of consciousness: awake and alert  Pain management: adequate  Airway patency: patent  Cardiovascular status: acceptable  Respiratory status: acceptable  Hydration status: acceptable  PONV: none     Anesthetic complications: None          Last vitals:  Vitals:    08/17/18 1044 08/17/18 1315   BP: (!) 132/91    Resp: 16 16   Temp: 36.9  C (98.4  F)    SpO2: 98%          Electronically Signed By: Geraldine Bush MD  August 17, 2018  1:42 PM

## 2018-08-17 NOTE — ANESTHESIA CARE TRANSFER NOTE
Patient: Cricket Chen    Procedure(s):  Repair Right Upper Arm Pseudo Aneursym  - Wound Class: I-Clean    Diagnosis: Pseudo Aneursym   Diagnosis Additional Information: No value filed.    Anesthesia Type:   General, ETT     Note:  Airway :Face Mask  Patient transferred to:PACU  Comments: Pt transferred to PACU.  Maintaining airway and oxygenation via FM.  VSS.  Report to RN.  All questions answered.Handoff Report: Identifed the Patient, Identified the Reponsible Provider, Reviewed the pertinent medical history, Discussed the surgical course, Reviewed Intra-OP anesthesia mangement and issues during anesthesia, Set expectations for post-procedure period and Allowed opportunity for questions and acknowledgement of understanding      Vitals: (Last set prior to Anesthesia Care Transfer)    CRNA VITALS  8/17/2018 1242 - 8/17/2018 1317      8/17/2018             Pulse: 83    SpO2: 97 %    Resp Rate (observed): (!)  1    Resp Rate (set): 10                Electronically Signed By: DARYN Valdez CRNA  August 17, 2018  1:17 PM

## 2018-08-20 LAB — COPATH REPORT: NORMAL

## 2018-08-21 DIAGNOSIS — Z94.4 LIVER REPLACED BY TRANSPLANT (H): ICD-10-CM

## 2018-08-21 LAB
ALBUMIN SERPL-MCNC: 3.9 G/DL (ref 3.4–5)
ALP SERPL-CCNC: 78 U/L (ref 40–150)
ALT SERPL W P-5'-P-CCNC: 118 U/L (ref 0–70)
ANION GAP SERPL CALCULATED.3IONS-SCNC: 6 MMOL/L (ref 3–14)
AST SERPL W P-5'-P-CCNC: 46 U/L (ref 0–45)
BILIRUB DIRECT SERPL-MCNC: 0.1 MG/DL (ref 0–0.2)
BILIRUB SERPL-MCNC: 0.5 MG/DL (ref 0.2–1.3)
BUN SERPL-MCNC: 15 MG/DL (ref 7–30)
CALCIUM SERPL-MCNC: 9.1 MG/DL (ref 8.5–10.1)
CHLORIDE SERPL-SCNC: 106 MMOL/L (ref 94–109)
CO2 SERPL-SCNC: 28 MMOL/L (ref 20–32)
CREAT SERPL-MCNC: 1.34 MG/DL (ref 0.66–1.25)
ERYTHROCYTE [DISTWIDTH] IN BLOOD BY AUTOMATED COUNT: 13.5 % (ref 10–15)
GFR SERPL CREATININE-BSD FRML MDRD: 60 ML/MIN/1.7M2
GLUCOSE SERPL-MCNC: 92 MG/DL (ref 70–99)
HCT VFR BLD AUTO: 45.6 % (ref 40–53)
HGB BLD-MCNC: 15.6 G/DL (ref 13.3–17.7)
MCH RBC QN AUTO: 30.8 PG (ref 26.5–33)
MCHC RBC AUTO-ENTMCNC: 34.2 G/DL (ref 31.5–36.5)
MCV RBC AUTO: 90 FL (ref 78–100)
PLATELET # BLD AUTO: 122 10E9/L (ref 150–450)
POTASSIUM SERPL-SCNC: 4.1 MMOL/L (ref 3.4–5.3)
PROT SERPL-MCNC: 7 G/DL (ref 6.8–8.8)
RBC # BLD AUTO: 5.07 10E12/L (ref 4.4–5.9)
SODIUM SERPL-SCNC: 140 MMOL/L (ref 133–144)
TACROLIMUS BLD-MCNC: 4.3 UG/L (ref 5–15)
TME LAST DOSE: ABNORMAL H
WBC # BLD AUTO: 4.9 10E9/L (ref 4–11)

## 2018-08-21 PROCEDURE — 85027 COMPLETE CBC AUTOMATED: CPT | Performed by: INTERNAL MEDICINE

## 2018-08-21 PROCEDURE — 80076 HEPATIC FUNCTION PANEL: CPT | Performed by: INTERNAL MEDICINE

## 2018-08-21 PROCEDURE — 80048 BASIC METABOLIC PNL TOTAL CA: CPT | Performed by: INTERNAL MEDICINE

## 2018-08-21 PROCEDURE — 36415 COLL VENOUS BLD VENIPUNCTURE: CPT | Performed by: INTERNAL MEDICINE

## 2018-08-21 PROCEDURE — 80197 ASSAY OF TACROLIMUS: CPT | Performed by: INTERNAL MEDICINE

## 2018-08-21 NOTE — PROGRESS NOTES
ALT and AST slightly elevated- may be because of pseudoaneurysm surgery a few days ago. Will repeat with routine transplant labs in 2 weeks.

## 2018-08-24 DIAGNOSIS — Z94.0 S/P KIDNEY TRANSPLANT: Primary | ICD-10-CM

## 2018-08-24 DIAGNOSIS — Z94.4 LIVER REPLACED BY TRANSPLANT (H): ICD-10-CM

## 2018-08-24 DIAGNOSIS — I10 HTN (HYPERTENSION): ICD-10-CM

## 2018-08-24 DIAGNOSIS — E83.42 HYPOMAGNESEMIA: ICD-10-CM

## 2018-08-24 RX ORDER — LISINOPRIL 5 MG/1
TABLET ORAL
Qty: 90 TABLET | Refills: 3 | Status: SHIPPED | OUTPATIENT
Start: 2018-08-24 | End: 2019-03-28

## 2018-08-24 RX ORDER — MAGNESIUM OXIDE 400 MG/1
TABLET ORAL
Qty: 120 TABLET | Refills: 11 | Status: SHIPPED | OUTPATIENT
Start: 2018-08-24 | End: 2019-11-21

## 2018-08-24 RX ORDER — AMLODIPINE BESYLATE 10 MG/1
TABLET ORAL
Qty: 90 TABLET | Refills: 3 | Status: SHIPPED | OUTPATIENT
Start: 2018-08-24 | End: 2019-03-28

## 2018-08-24 RX ORDER — TACROLIMUS 1 MG/1
1 CAPSULE ORAL 2 TIMES DAILY
Qty: 60 CAPSULE | Refills: 11 | Status: SHIPPED | OUTPATIENT
Start: 2018-08-24 | End: 2018-11-13

## 2018-08-27 ENCOUNTER — OFFICE VISIT (OUTPATIENT)
Dept: VASCULAR SURGERY | Facility: CLINIC | Age: 38
End: 2018-08-27
Payer: COMMERCIAL

## 2018-08-27 DIAGNOSIS — T82.510D PSEUDOANEURYSM OF SURGICAL ARTERIOVENOUS FISTULA, SUBSEQUENT ENCOUNTER: Primary | ICD-10-CM

## 2018-08-27 ASSESSMENT — PAIN SCALES - GENERAL: PAINLEVEL: NO PAIN (0)

## 2018-08-27 NOTE — PROGRESS NOTES
VASCULAR SURGERY CLINIC NOTE    HPI:    Pt is a 37 year old year old male with a past medical history significant for hepatorenal syndrome secondary to alcoholic cirrhosis s/p liver/kidney transplant, atrial flutter, anxiety, anemia, and history of a RUE brachiocephalic fistula ligated. Pt is S/P Excision of right brachial artery pseudoaneurysm 8/17/18 with Dr. Payton. Pt is here today for a routine post-op check.    Subjective:   Pt reports he's been doing well since surgery. Pt has had no pain. He kept the wrap in place until over the weekend and hasn't been wearing it since. He has some local swelling at the incision site, but it's otherwise minimal. Pt also has some minor bruising near the incision. Pt has full hand mobility, strength, and sensation. His right hand is warm without numbness, tingling, or weakness. Pt has no concerns and has no questions.     Review Of Systems:   General: Denies F/C  Respiratory: Denies SOB  Cardio: Denies CP  Gastrointestinal: Denies N/V  Neurologic: Denies HA  Psychiatric: Denies confusion    Patient Active Problem List   Diagnosis     Atrial flutter (H)     Complete transposition of great vessels     Esophageal varices (H)     Insomnia     Alcoholic hepatitis     History of alcohol abuse     History of transposition of great vessels     Depression     Thrombocytopenia (H)     Pain medication agreement     [facility]     Liver replaced by transplant (H)     Kidney replaced by transplant     Immunosuppressed status (H)     Hypomagnesemia     Secondary renal hyperparathyroidism (H)     Chronic pain syndrome     Pain management contract signed     Aftercare following organ transplant     Post-transplant lymphoproliferative disorder (H)     Papillary thyroid carcinoma (H)     Advance directive discussed with patient     Acute alcoholic liver disease     Alcohol dependence (H)     Encounter for palliative care      Hypertension secondary to other renal disorders     Atrial fibrillation (H)     Postoperative hypothyroidism     Arm mass, right     Dermatitis, seborrheic     Past Surgical History:  Past Surgical History:   Procedure Laterality Date     ANESTHESIA CARDIOVERSION N/A 3/7/2018    Procedure: ANESTHESIA CARDIOVERSION;  Cardioversion;  Surgeon: GENERIC ANESTHESIA PROVIDER;  [facility]     BENCH LIVER N/A 5/10/2016    Procedure: BENCH LIVER;  Surgeon: Ricky Deshpande MD;  [facility]     BIOPSY LYMPH NODE CERVICAL Right 1/26/2017    Procedure: BIOPSY LYMPH NODE CERVICAL;  Surgeon: Beka Soto MD;  [facility]     CARDIAC SURGERY  9-10-80     CREATE FISTULA ARTERIOVENOUS UPPER EXTREMITY Right 9/16/2014    Procedure: CREATE FISTULA ARTERIOVENOUS UPPER EXTREMITY;  Surgeon: Padmaja Eaton MD;  [facility]     CYSTOSCOPY, REMOVE STENT(S), COMBINED Right 6/22/2016    Procedure: COMBINED CYSTOSCOPY, REMOVE STENT(S);  Surgeon: Ricky Deshpande MD;  [facility]     EMBOLECTOMY UPPER EXTREMITY Right 8/17/2018    Procedure: EMBOLECTOMY UPPER EXTREMITY;  Repair Right Upper Arm Pseudo Aneursym ;  Surgeon: John Payton MD;  [facility]     ENT SURGERY       ESOPHAGOSCOPY, GASTROSCOPY, DUODENOSCOPY (EGD), COMBINED  5/30/2014    Procedure: COMBINED ESOPHAGOSCOPY, GASTROSCOPY, DUODENOSCOPY (EGD);  Surgeon: Guillaume Bautista MD;  Location:  GI     ESOPHAGOSCOPY, GASTROSCOPY, DUODENOSCOPY (EGD), COMBINED  9/30/14     ESOPHAGOSCOPY, GASTROSCOPY, DUODENOSCOPY (EGD), COMBINED Left 3/12/2015    Procedure: COMBINED ESOPHAGOSCOPY, GASTROSCOPY, DUODENOSCOPY (EGD);  Surgeon: Laureen Galvan MD;  Location:  GI     ESOPHAGOSCOPY, GASTROSCOPY, DUODENOSCOPY (EGD), COMBINED N/A 4/21/2016    Procedure: COMBINED ESOPHAGOSCOPY, GASTROSCOPY, DUODENOSCOPY (EGD);  Surgeon: Laureen Galavn MD;  Location:  GI     ESOPHAGOSCOPY, GASTROSCOPY, DUODENOSCOPY (EGD), COMBINED N/A  2016    Procedure: COMBINED ESOPHAGOSCOPY, GASTROSCOPY, DUODENOSCOPY (EGD);  Surgeon: Laureen Galvan MD;  [facility]     HC OR CATH ABLATION NON-CARDIAC ENDOVASCULAR      SVT     INSERT PORT VASCULAR ACCESS N/A 4/3/2017    Procedure: INSERT PORT VASCULAR ACCESS;  Surgeon: Rajendra Jacques PA-C;  [facility]     LIGATE FISTULA ARTERIOVENOUS UPPER EXTREMITY Right 2017    Procedure: LIGATE FISTULA ARTERIOVENOUS UPPER EXTREMITY;  Revision of Right Arm Arteriovenous Fistula ;  Surgeon: Padmaja Eaton MD;  [facility]     PICC INSERTION  14    PICC line placement 14; Removal 2014     THORACIC SURGERY      Transposition great arteries, repaired at 8 months     THYROIDECTOMY Right 2017    Procedure: THYROIDECTOMY;  Bilateral Total Thyroidectomy ;  Surgeon: Beka Soto MD;  [facility]     TRANSPLANT KIDNEY RECIPIENT  DONOR  5/10/2016    Procedure: TRANSPLANT KIDNEY RECIPIENT  DONOR;  Surgeon: Ricky Deshpande MD;  [facility]     TRANSPLANT LIVER RECIPIENT  DONOR N/A 5/10/2016    Procedure: TRANSPLANT LIVER RECIPIENT  DONOR;  Surgeon: Ricky Deshpande MD;  [facility]     Objective:  Vital Signs:  There were no vitals taken for this visit.    Prior to Admission medications    Medication Sig Start Date End Date Taking? Authorizing Provider   amLODIPine (NORVASC) 10 MG tablet TAKE ONE TABLET BY MOUTH EVERY DAY 18  Yes Cedrick West MD   apixaban ANTICOAGULANT (ELIQUIS) 5 MG tablet Take 1 tablet (5 mg) by mouth 2 times daily 8/3/18  Yes Jaylen George MD   ketoconazole (NIZORAL) 2 % cream Twice daily to areas of rash on face 18  Yes Yuval Dobbins MD   ketoconazole (NIZORAL) 2 % shampoo Use as a foaming face wash in shower daily 18  Yes Yuval Dobbins MD   levothyroxine (SYNTHROID/LEVOTHROID) 137 MCG tablet Take 1 tablet by mouth Monday - Saturday  and 1.5 tablets on   8/13/18  Yes Zuleyma Gray MD   lisinopril (PRINIVIL/ZESTRIL) 5 MG tablet TAKE ONE TABLET BY MOUTH EVERY DAY 8/24/18  Yes Cedrick West MD   magnesium oxide (MAG-OX) 400 MG tablet TAKE ONE TABLET BY MOUTH TWICE A DAY 8/24/18  Yes Laureen Galvan MD   metoprolol succinate (TOPROL-XL) 25 MG 24 hr tablet Take 1 tablet (25 mg) by mouth daily 4/13/18  Yes Jaylen George MD   mycophenolate (GENERIC EQUIVALENT) 250 MG capsule Take 1 capsule (250 mg) by mouth 2 times daily 12/18/17  Yes Jake Sidhu MD   oxyCODONE-acetaminophen (PERCOCET) 5-325 MG per tablet Take 1 tablet by mouth every 4 hours as needed for pain (moderate to severe) 8/17/18  Yes Sobeida Norwood MD   predniSONE (DELTASONE) 5 MG tablet Take 2.5 mg once daily for 14 days, then stop 8/6/18  Yes Cedrick West MD   sulfamethoxazole-trimethoprim (BACTRIM/SEPTRA) 400-80 MG per tablet Take 1 tablet by mouth daily 3/1/18  Yes Laureen Galvan MD   tacrolimus (GENERIC EQUIVALENT) 0.5 MG capsule TAKE ONE CAPSULE BY MOUTH TWICE A DAY. (TOTAL DAILY DOSE IS 1.5MG TWO TIMES A DAY). 8/13/18  Yes Jake Sidhu MD   tacrolimus (GENERIC EQUIVALENT) 1 MG capsule Take 1 capsule (1 mg) by mouth 2 times daily 8/24/18  Yes Jake Sidhu MD     PHYSICAL EXAM:  General: The patient is alert and oriented.  Moves all extremities.   HEENT: Color appropriate for race, warm, dry  Heart: RRR  Lungs: Breathing unlabored    EXTREMITIES: Skin over the upper extremities C/D/I. No edema noted, right upper extremity incision healing well without erythema, warmth, discharge.   PULSES: palpable bilateral radial pulses  Neurologic: upper extremity cutaneous sensation, strength, and dexterity grossly intact        Assessment:  37 year old year old male S/P Excision of right brachial artery pseudoaneurysm 8/17/18 with Dr. Payton. Pt is here today for a routine post-op check and is healing well.    Plan:  -Continue Apixaban for  A fib  -Continue routine post-op cares  -Follow-up with Dr. Payton as scheduled for 1 month post-op check       Karin Arnold PA-C   Vascular Surgery Service  [facility]   Pager: [phone]      Answers for HPI/ROS submitted by the patient on 8/19/2018   General Symptoms: No  Skin Symptoms: No  HENT Symptoms: No  EYE SYMPTOMS: No  HEART SYMPTOMS: No  LUNG SYMPTOMS: No  INTESTINAL SYMPTOMS: No  URINARY SYMPTOMS: No  REPRODUCTIVE SYMPTOMS: No  SKELETAL SYMPTOMS: No  BLOOD SYMPTOMS: No  NERVOUS SYSTEM SYMPTOMS: No  MENTAL HEALTH SYMPTOMS: No

## 2018-08-27 NOTE — LETTER
8/27/2018       RE: Cricket Chen  [address]  [place] [address]     Dear Colleague,    Thank you for referring your patient, Cricket Chen, to the [facility] at [facility]. Please see a copy of my visit note below.    VASCULAR SURGERY CLINIC NOTE    HPI:    Pt is a 37 year old year old male with a past medical history significant for hepatorenal syndrome secondary to alcoholic cirrhosis s/p liver/kidney transplant, atrial flutter, anxiety, anemia, and history of a RUE brachiocephalic fistula ligated. Pt is S/P Excision of right brachial artery pseudoaneurysm 8/17/18 with Dr. Payton. Pt is here today for a routine post-op check.    Subjective:   Pt reports he's been doing well since surgery. Pt has had no pain. He kept the wrap in place until over the weekend and hasn't been wearing it since. He has some local swelling at the incision site, but it's otherwise minimal. Pt also has some minor bruising near the incision. Pt has full hand mobility, strength, and sensation. His right hand is warm without numbness, tingling, or weakness. Pt has no concerns and has no questions.     Review Of Systems:   General: Denies F/C  Respiratory: Denies SOB  Cardio: Denies CP  Gastrointestinal: Denies N/V  Neurologic: Denies HA  Psychiatric: Denies confusion    Patient Active Problem List   Diagnosis     Atrial flutter (H)     Complete transposition of great vessels     Esophageal varices (H)     Insomnia     Alcoholic hepatitis     History of alcohol abuse     History of transposition of great vessels     Depression     Thrombocytopenia (H)     Pain medication agreement     [facility]     Liver replaced by transplant (H)     Kidney replaced by transplant     Immunosuppressed status (H)     Hypomagnesemia     Secondary renal hyperparathyroidism (H)     Chronic pain syndrome     Pain management  contract signed     Aftercare following organ transplant     Post-transplant lymphoproliferative disorder (H)     Papillary thyroid carcinoma (H)     Advance directive discussed with patient     Acute alcoholic liver disease     Alcohol dependence (H)     Encounter for palliative care     Hypertension secondary to other renal disorders     Atrial fibrillation (H)     Postoperative hypothyroidism     Arm mass, right     Dermatitis, seborrheic     Past Surgical History:  Past Surgical History:   Procedure Laterality Date     ANESTHESIA CARDIOVERSION N/A 3/7/2018    Procedure: ANESTHESIA CARDIOVERSION;  Cardioversion;  Surgeon: GENERIC ANESTHESIA PROVIDER;  [facility]     BENCH LIVER N/A 5/10/2016    Procedure: BENCH LIVER;  Surgeon: Ricky Deshpande MD;  [facility]     BIOPSY LYMPH NODE CERVICAL Right 1/26/2017    Procedure: BIOPSY LYMPH NODE CERVICAL;  Surgeon: Beka Soto MD;  [facility]     CARDIAC SURGERY  9-10-80     CREATE FISTULA ARTERIOVENOUS UPPER EXTREMITY Right 9/16/2014    Procedure: CREATE FISTULA ARTERIOVENOUS UPPER EXTREMITY;  Surgeon: Padmaja Eaton MD;  [facility]     CYSTOSCOPY, REMOVE STENT(S), COMBINED Right 6/22/2016    Procedure: COMBINED CYSTOSCOPY, REMOVE STENT(S);  Surgeon: Ricky Deshpande MD;  [facility]     EMBOLECTOMY UPPER EXTREMITY Right 8/17/2018    Procedure: EMBOLECTOMY UPPER EXTREMITY;  Repair Right Upper Arm Pseudo Aneursym ;  Surgeon: John Payton MD;  [facility]     ENT SURGERY       ESOPHAGOSCOPY, GASTROSCOPY, DUODENOSCOPY (EGD), COMBINED  5/30/2014    Procedure: COMBINED ESOPHAGOSCOPY, GASTROSCOPY, DUODENOSCOPY (EGD);  Surgeon: Guillaume Bautista MD;  Location:  GI     ESOPHAGOSCOPY, GASTROSCOPY, DUODENOSCOPY (EGD), COMBINED  9/30/14     ESOPHAGOSCOPY, GASTROSCOPY, DUODENOSCOPY (EGD), COMBINED Left 3/12/2015    Procedure: COMBINED ESOPHAGOSCOPY, GASTROSCOPY, DUODENOSCOPY (EGD);  Surgeon: Laureen Galvan MD;   [facility]     ESOPHAGOSCOPY, GASTROSCOPY, DUODENOSCOPY (EGD), COMBINED N/A 2016    Procedure: COMBINED ESOPHAGOSCOPY, GASTROSCOPY, DUODENOSCOPY (EGD);  Surgeon: Laureen Galvan MD;  [facility]     ESOPHAGOSCOPY, GASTROSCOPY, DUODENOSCOPY (EGD), COMBINED N/A 2016    Procedure: COMBINED ESOPHAGOSCOPY, GASTROSCOPY, DUODENOSCOPY (EGD);  Surgeon: Laureen Galvan MD;  [facility]     HC OR CATH ABLATION NON-CARDIAC ENDOVASCULAR      SVT     INSERT PORT VASCULAR ACCESS N/A 4/3/2017    Procedure: INSERT PORT VASCULAR ACCESS;  Surgeon: Rajendra Jacques PA-C;  [facility]     LIGATE FISTULA ARTERIOVENOUS UPPER EXTREMITY Right 2017    Procedure: LIGATE FISTULA ARTERIOVENOUS UPPER EXTREMITY;  Revision of Right Arm Arteriovenous Fistula ;  Surgeon: Padmaja Eaton MD;  [facility]     PICC INSERTION  14    PICC line placement 14; Removal 2014     THORACIC SURGERY  1980    Transposition great arteries, repaired at 8 months     THYROIDECTOMY Right 2017    Procedure: THYROIDECTOMY;  Bilateral Total Thyroidectomy ;  Surgeon: Beka Soto MD;  [facility]     TRANSPLANT KIDNEY RECIPIENT  DONOR  5/10/2016    Procedure: TRANSPLANT KIDNEY RECIPIENT  DONOR;  Surgeon: Ricky Deshpande MD;  [facility]     TRANSPLANT LIVER RECIPIENT  DONOR N/A 5/10/2016    Procedure: TRANSPLANT LIVER RECIPIENT  DONOR;  Surgeon: Ricky Deshpande MD;  [facility]     Objective:  Vital Signs:  There were no vitals taken for this visit.    Prior to Admission medications    Medication Sig Start Date End Date Taking? Authorizing Provider   amLODIPine (NORVASC) 10 MG tablet TAKE ONE TABLET BY MOUTH EVERY DAY 18  Yes Cedrick West MD   apixaban ANTICOAGULANT (ELIQUIS) 5 MG tablet Take 1 tablet (5 mg) by mouth 2 times daily 8/3/18  Yes Jaylen George MD   ketoconazole (NIZORAL) 2 % cream Twice daily to areas  of rash on face 8/2/18  Yes Yuval Dobbins MD   ketoconazole (NIZORAL) 2 % shampoo Use as a foaming face wash in shower daily 8/2/18  Yes Yuval Dobbins MD   levothyroxine (SYNTHROID/LEVOTHROID) 137 MCG tablet Take 1 tablet by mouth Monday - Saturday  and 1.5 tablets on Sunday 8/13/18  Yes Zuleyma Gray MD   lisinopril (PRINIVIL/ZESTRIL) 5 MG tablet TAKE ONE TABLET BY MOUTH EVERY DAY 8/24/18  Yes Cedrick West MD   magnesium oxide (MAG-OX) 400 MG tablet TAKE ONE TABLET BY MOUTH TWICE A DAY 8/24/18  Yes Laureen Galvan MD   metoprolol succinate (TOPROL-XL) 25 MG 24 hr tablet Take 1 tablet (25 mg) by mouth daily 4/13/18  Yes Jaylen George MD   mycophenolate (GENERIC EQUIVALENT) 250 MG capsule Take 1 capsule (250 mg) by mouth 2 times daily 12/18/17  Yes Jake Sidhu MD   oxyCODONE-acetaminophen (PERCOCET) 5-325 MG per tablet Take 1 tablet by mouth every 4 hours as needed for pain (moderate to severe) 8/17/18  Yes Sobeida Norwood MD   predniSONE (DELTASONE) 5 MG tablet Take 2.5 mg once daily for 14 days, then stop 8/6/18  Yes Cedrick West MD   sulfamethoxazole-trimethoprim (BACTRIM/SEPTRA) 400-80 MG per tablet Take 1 tablet by mouth daily 3/1/18  Yes Laureen Galvan MD   tacrolimus (GENERIC EQUIVALENT) 0.5 MG capsule TAKE ONE CAPSULE BY MOUTH TWICE A DAY. (TOTAL DAILY DOSE IS 1.5MG TWO TIMES A DAY). 8/13/18  Yes Jake Sidhu MD   tacrolimus (GENERIC EQUIVALENT) 1 MG capsule Take 1 capsule (1 mg) by mouth 2 times daily 8/24/18  Yes Jake Sidhu MD     PHYSICAL EXAM:  General: The patient is alert and oriented.  Moves all extremities.   HEENT: Color appropriate for race, warm, dry  Heart: RRR  Lungs: Breathing unlabored    EXTREMITIES: Skin over the upper extremities C/D/I. No edema noted, right upper extremity incision healing well without erythema, warmth, discharge.   PULSES: palpable bilateral radial pulses  Neurologic: upper extremity  cutaneous sensation, strength, and dexterity grossly intact        Assessment:  37 year old year old male S/P Excision of right brachial artery pseudoaneurysm 8/17/18 with Dr. Payton. Pt is here today for a routine post-op check and is healing well.    Plan:  -Continue Apixaban for A fib  -Continue routine post-op cares  -Follow-up with Dr. Payton as scheduled for 1 month post-op check       Karin Arnold PA-C   Vascular Surgery Service  [facility]   Pager: [phone]

## 2018-08-27 NOTE — NURSING NOTE
Chief Complaint   Patient presents with     RECHECK     incision check on R forearm     Deanna Rosario, EMT

## 2018-08-27 NOTE — MR AVS SNAPSHOT
After Visit Summary   8/27/2018    Cricket Chen    MRN: [id number]           Patient Information     Date Of Birth          1980        Visit Information        Provider Department      8/27/2018 1:30 PM Karin Arnold PA-C [facility]        Today's Diagnoses     Pseudoaneurysm of surgical arteriovenous fistula, subsequent encounter    -  1       Follow-ups after your visit        Your next 10 appointments already scheduled     Sep 04, 2018 11:15 AM CDT   LAB with [facility]   [facility]    [address] [address]   [phone]           Please do not eat 10-12 hours before your appointment if you are coming in fasting for labs on lipids, cholesterol, or glucose (sugar). This does not apply to pregnant women. Water, hot tea and black coffee (with nothing added) are okay. Do not drink other fluids, diet soda or chew gum.            Sep 18, 2018 11:15 AM CDT   LAB with [facility]   [facility] ([facility]    [address] [address]   [phone]           Please do not eat 10-12 hours before your appointment if you are coming in fasting for labs on lipids, cholesterol, or glucose (sugar). This does not apply to pregnant women. Water, hot tea and black coffee (with nothing added) are okay. Do not drink other fluids, diet soda or chew gum.            Sep 20, 2018  3:40 PM CDT   (Arrive by 3:25 PM)   Return Vascular Visit with John Payton MD   [facility] ([facility])    [address] [place] [address]   [phone]            Oct 02, 2018 11:15 AM CDT   LAB with [facility] LAB   [facility] ([facility])    [address] [address]   [phone]           Please do not eat 10-12 hours before your appointment if you are coming in fasting for labs on lipids,  cholesterol, or glucose (sugar). This does not apply to pregnant women. Water, hot tea and black coffee (with nothing added) are okay. Do not drink other fluids, diet soda or chew gum.            Oct 18, 2018 12:45 PM CDT   (Arrive by 12:30 PM)   Return Visit with Yuval Dobbins MD   [facility] ([facility])    [address]  3rd [address]  [place] [address] [phone]            Jan 08, 2019 12:00 PM CST   CT CHEST ABDOMEN PELVIS W/O & W CONTRAST with UCCT2   [facility] CT ([facility])    [address]  1st Floor  [place] [id number] [phone]           Please bring any scans or X-rays taken at other hospitals, if similar tests were done. Also bring a list of your medicines, including vitamins, minerals and over-the-counter drugs. It is safest to leave personal items at home.  Be sure to tell your doctor:   If you have any allergies.   If there s any chance you are pregnant.   If you are breastfeeding.  How to prepare:   Do not eat or drink for 2 hours before your exam. If you need to take medicine, you may take it with small sips of water. (We may ask you to take liquid medicine as well.)   Please wear loose clothing, such as a sweat suit or jogging clothes. Avoid snaps, zippers and other metal. We may ask you to undress and put on a hospital gown.  Please arrive 30 minutes early for your CT. Once in the department you might be asked to drink water 15-20 minutes prior to your exam.  If indicated you may be asked to drink an oral contrast in advance of your CT.  If this is the case, the imaging team will let you know or be in contact with you prior to your appointment  Patients over 70 or patients with diabetes or kidney problems:   If you haven t had a blood test (creatinine test) within the last 30 days, the Cardiologist/Radiologist may require you to get this test prior to your exam.  If you have diabetes:    Continue to take your metformin medication on the day of your exam  If you have any questions, please call the Imaging Department where you will have your exam.            George 15, 2019 11:15 AM CST   [facility] Draw with [facility] DRAW   [facility] Lab Draw ([facility])    [address]  [address]  [place] [place] [address]   [phone]            George 15, 2019 11:45 AM CST   (Arrive by 11:30 AM)   Return Visit with Carter Prather MD   [facility] ([facility])    [address]  [address]  [place] [place] [address]   [phone]            Jan 18, 2019 11:00 AM CST   (Arrive by 10:45 AM)   Return Liver Transplant with Laureen Galvan MD   [facility]at[facility] ([facility])    [address] [place] [address]  [address]  [place] [place] [address]   [phone]            Feb 12, 2019  2:35 PM CST   (Arrive by 2:05 PM)   Return Kidney Transplant with  Kidney/Pancreas Recipient   [facility] Nephrology ([facility])    [address]  [address]  [place] [place] [address] [phone]              Who to contact     Please call your clinic at [phone] to:    Ask questions about your health    Make or cancel appointments    Discuss your medicines    Learn about your test results    Speak to your doctor            Additional Information About Your Visit        [employer] Information     [employer] gives you secure access to your electronic health record. If you see a primary care provider, you can also send messages to your care team and make appointments. If you have questions, please call your primary care clinic.  If you do not have a primary care provider, please call [phone] and they will assist you.      [employer] is an electronic gateway that provides easy, online access to your medical records. With [employer], you can request a clinic appointment, read your test  results, renew a prescription or communicate with your care team.     To access your existing account, please contact your [facility] or call [phone] for assistance.        Care EveryWhere ID     This is your Care EveryWhere ID. This could be used by other organizations to access your [facility] medical records  [id number]         Blood Pressure from Last 3 Encounters:   08/17/18 135/81   08/13/18 137/90   08/10/18 130/85    Weight from Last 3 Encounters:   08/17/18 190 lb 11.2 oz   08/13/18 192 lb 14.4 oz   08/10/18 191 lb 6.4 oz              Today, you had the following     No orders found for display       Primary Care Provider    [facility]       No address on file        Equal Access to Services     CLAY VIDES : Hadii steffany boldeno Dharmesh, waaxda luqadaha, qaybta kaalmada adesusieyacindi, robina white . So [facility] [phone].    ATENCIÓN: Si habla español, tiene a laura disposición servicios gratuitos de asistencia lingüística. Llame al [phone].    We comply with applicable federal civil rights laws and [place] laws. We do not discriminate on the basis of race, color, national origin, age, disability, sex, sexual orientation, or gender identity.            Thank you!     Thank you for choosing [facility]  for your care. Our goal is always to provide you with excellent care. Hearing back from our patients is one way we can continue to improve our services. Please take a few minutes to complete the written survey that you may receive in the mail after your visit with us. Thank you!             Your Updated Medication List - Protect others around you: Learn how to safely use, store and throw away your medicines at www.disposemymeds.org.          This list is accurate as of 8/27/18  3:02 PM.  Always use your most recent med list.                   Brand Name Dispense Instructions for use Diagnosis    amLODIPine  10 MG tablet    NORVASC    90 tablet    TAKE ONE TABLET BY MOUTH EVERY DAY    HTN (hypertension), Liver replaced by transplant (H), S/P kidney transplant       apixaban ANTICOAGULANT 5 MG tablet    ELIQUIS    180 tablet    Take 1 tablet (5 mg) by mouth 2 times daily    Atrial flutter, unspecified type (H)       * ketoconazole 2 % cream    NIZORAL    60 g    Twice daily to areas of rash on face    Dermatitis, seborrheic       * ketoconazole 2 % shampoo    NIZORAL    120 mL    Use as a foaming face wash in shower daily    Dermatitis, seborrheic       levothyroxine 137 MCG tablet    SYNTHROID/LEVOTHROID    96 tablet    Take 1 tablet by mouth Monday - Saturday  and 1.5 tablets on Sunday    Papillary thyroid carcinoma (H), Postoperative hypothyroidism       lisinopril 5 MG tablet    PRINIVIL/ZESTRIL    90 tablet    TAKE ONE TABLET BY MOUTH EVERY DAY    HTN (hypertension), Liver replaced by transplant (H), S/P kidney transplant       magnesium oxide 400 MG tablet    MAG-OX    120 tablet    TAKE ONE TABLET BY MOUTH TWICE A DAY    Hypomagnesemia       metoprolol succinate 25 MG 24 hr tablet    TOPROL-XL    90 tablet    Take 1 tablet (25 mg) by mouth daily    Atrial flutter, unspecified type (H)       mycophenolate 250 MG capsule    GENERIC EQUIVALENT    60 capsule    Take 1 capsule (250 mg) by mouth 2 times daily    Kidney replaced by transplant       oxyCODONE-acetaminophen 5-325 MG per tablet    PERCOCET    10 tablet    Take 1 tablet by mouth every 4 hours as needed for pain (moderate to severe)    Postoperative pain       predniSONE 5 MG tablet    DELTASONE    90 tablet    Take 2.5 mg once daily for 14 days, then stop    Liver replaced by transplant (H), S/P kidney transplant       sulfamethoxazole-trimethoprim 400-80 MG per tablet    BACTRIM/SEPTRA    30 tablet    Take 1 tablet by mouth daily    Liver replaced by transplant (H), S/P kidney transplant       * tacrolimus 0.5 MG capsule    GENERIC EQUIVALENT    60 capsule     TAKE ONE CAPSULE BY MOUTH TWICE A DAY. (TOTAL DAILY DOSE IS 1.5MG TWO TIMES A DAY).    S/P kidney transplant       * tacrolimus 1 MG capsule    GENERIC EQUIVALENT    60 capsule    Take 1 capsule (1 mg) by mouth 2 times daily    Liver replaced by transplant (H), S/P kidney transplant, HTN (hypertension)       * Notice:  This list has 4 medication(s) that are the same as other medications prescribed for you. Read the directions carefully, and ask your doctor or other care provider to review them with you.

## 2018-09-04 DIAGNOSIS — Z94.0 S/P KIDNEY TRANSPLANT: ICD-10-CM

## 2018-09-04 DIAGNOSIS — Z94.4 LIVER REPLACED BY TRANSPLANT (H): ICD-10-CM

## 2018-09-04 DIAGNOSIS — Z94.0 KIDNEY TRANSPLANTED: ICD-10-CM

## 2018-09-04 LAB
ALBUMIN UR-MCNC: NEGATIVE MG/DL
ANION GAP SERPL CALCULATED.3IONS-SCNC: 6 MMOL/L (ref 3–14)
APPEARANCE UR: CLEAR
BILIRUB UR QL STRIP: NEGATIVE
BUN SERPL-MCNC: 16 MG/DL (ref 7–30)
CALCIUM SERPL-MCNC: 9.1 MG/DL (ref 8.5–10.1)
CHLORIDE SERPL-SCNC: 107 MMOL/L (ref 94–109)
CHOLEST SERPL-MCNC: 221 MG/DL
CO2 SERPL-SCNC: 26 MMOL/L (ref 20–32)
COLOR UR AUTO: YELLOW
CREAT SERPL-MCNC: 1.3 MG/DL (ref 0.66–1.25)
CREAT UR-MCNC: 81 MG/DL
ERYTHROCYTE [DISTWIDTH] IN BLOOD BY AUTOMATED COUNT: 13.2 % (ref 10–15)
GFR SERPL CREATININE-BSD FRML MDRD: 62 ML/MIN/1.7M2
GLUCOSE SERPL-MCNC: 94 MG/DL (ref 70–99)
GLUCOSE UR STRIP-MCNC: NEGATIVE MG/DL
HCT VFR BLD AUTO: 42.2 % (ref 40–53)
HDLC SERPL-MCNC: 40 MG/DL
HGB BLD-MCNC: 14.6 G/DL (ref 13.3–17.7)
HGB UR QL STRIP: NEGATIVE
KETONES UR STRIP-MCNC: NEGATIVE MG/DL
LDLC SERPL CALC-MCNC: 158 MG/DL
LEUKOCYTE ESTERASE UR QL STRIP: NEGATIVE
MCH RBC QN AUTO: 31.1 PG (ref 26.5–33)
MCHC RBC AUTO-ENTMCNC: 34.6 G/DL (ref 31.5–36.5)
MCV RBC AUTO: 90 FL (ref 78–100)
NITRATE UR QL: NEGATIVE
NONHDLC SERPL-MCNC: 181 MG/DL
PH UR STRIP: 6 PH (ref 5–7)
PLATELET # BLD AUTO: 122 10E9/L (ref 150–450)
POTASSIUM SERPL-SCNC: 4.4 MMOL/L (ref 3.4–5.3)
PROT UR-MCNC: 0.08 G/L
PROT/CREAT 24H UR: 0.1 G/G CR (ref 0–0.2)
RBC # BLD AUTO: 4.7 10E12/L (ref 4.4–5.9)
SODIUM SERPL-SCNC: 139 MMOL/L (ref 133–144)
SOURCE: NORMAL
SP GR UR STRIP: <=1.005 (ref 1–1.03)
TACROLIMUS BLD-MCNC: 4.9 UG/L (ref 5–15)
TME LAST DOSE: ABNORMAL H
TRIGL SERPL-MCNC: 113 MG/DL
UROBILINOGEN UR STRIP-ACNC: 0.2 EU/DL (ref 0.2–1)
WBC # BLD AUTO: 4.6 10E9/L (ref 4–11)

## 2018-09-04 PROCEDURE — 80048 BASIC METABOLIC PNL TOTAL CA: CPT | Performed by: INTERNAL MEDICINE

## 2018-09-04 PROCEDURE — 81003 URINALYSIS AUTO W/O SCOPE: CPT | Performed by: INTERNAL MEDICINE

## 2018-09-04 PROCEDURE — 80197 ASSAY OF TACROLIMUS: CPT | Performed by: INTERNAL MEDICINE

## 2018-09-04 PROCEDURE — 36415 COLL VENOUS BLD VENIPUNCTURE: CPT | Performed by: INTERNAL MEDICINE

## 2018-09-04 PROCEDURE — 80061 LIPID PANEL: CPT | Performed by: INTERNAL MEDICINE

## 2018-09-04 PROCEDURE — 87799 DETECT AGENT NOS DNA QUANT: CPT | Performed by: INTERNAL MEDICINE

## 2018-09-04 PROCEDURE — 84156 ASSAY OF PROTEIN URINE: CPT | Performed by: INTERNAL MEDICINE

## 2018-09-04 PROCEDURE — 85027 COMPLETE CBC AUTOMATED: CPT | Performed by: INTERNAL MEDICINE

## 2018-09-05 ENCOUNTER — TELEPHONE (OUTPATIENT)
Dept: TRANSPLANT | Facility: CLINIC | Age: 38
End: 2018-09-05

## 2018-09-05 LAB
BKV DNA # SPEC NAA+PROBE: 9282 COPIES/ML
BKV DNA SPEC NAA+PROBE-LOG#: 4 LOG COPIES/ML
SPECIMEN SOURCE: ABNORMAL

## 2018-09-06 NOTE — TELEPHONE ENCOUNTER
Issue:  Elevated  and cholesterol 221.       Kings Acuna MD Hasebroock, Rebecca, VAL                   Needs to follow up with PCP on abnormal lipids         LPN TASK:    Please call pt and have him follow up with PCP regarding lipid panel

## 2018-09-06 NOTE — TELEPHONE ENCOUNTER
Call placed to patient. Patient notes that he doesn't have a PCP.  Patient v\u to establish care with a PCP so that his lipids and cholesterol levels can be properly monitored. Task sent to

## 2018-09-11 DIAGNOSIS — Z94.0 KIDNEY TRANSPLANTED: Primary | ICD-10-CM

## 2018-09-11 NOTE — PROGRESS NOTES
Pt. weaned off Prednisone on 8/20/18.  BK virus increased a bit.  Discussed pt. with Dr. West, plan to recheck BK PCR and IgG level.

## 2018-09-18 DIAGNOSIS — Z94.0 KIDNEY TRANSPLANTED: ICD-10-CM

## 2018-09-18 LAB
ANION GAP SERPL CALCULATED.3IONS-SCNC: 7 MMOL/L (ref 3–14)
BUN SERPL-MCNC: 20 MG/DL (ref 7–30)
CALCIUM SERPL-MCNC: 9.4 MG/DL (ref 8.5–10.1)
CHLORIDE SERPL-SCNC: 106 MMOL/L (ref 94–109)
CO2 SERPL-SCNC: 26 MMOL/L (ref 20–32)
CREAT SERPL-MCNC: 1.51 MG/DL (ref 0.66–1.25)
ERYTHROCYTE [DISTWIDTH] IN BLOOD BY AUTOMATED COUNT: 13.2 % (ref 10–15)
GFR SERPL CREATININE-BSD FRML MDRD: 52 ML/MIN/1.7M2
GLUCOSE SERPL-MCNC: 86 MG/DL (ref 70–99)
HCT VFR BLD AUTO: 45.8 % (ref 40–53)
HGB BLD-MCNC: 15.9 G/DL (ref 13.3–17.7)
MCH RBC QN AUTO: 30.6 PG (ref 26.5–33)
MCHC RBC AUTO-ENTMCNC: 34.7 G/DL (ref 31.5–36.5)
MCV RBC AUTO: 88 FL (ref 78–100)
PLATELET # BLD AUTO: 135 10E9/L (ref 150–450)
POTASSIUM SERPL-SCNC: 4.2 MMOL/L (ref 3.4–5.3)
RBC # BLD AUTO: 5.19 10E12/L (ref 4.4–5.9)
SODIUM SERPL-SCNC: 139 MMOL/L (ref 133–144)
TACROLIMUS BLD-MCNC: 5.4 UG/L (ref 5–15)
TME LAST DOSE: NORMAL H
WBC # BLD AUTO: 4.9 10E9/L (ref 4–11)

## 2018-09-18 PROCEDURE — 85027 COMPLETE CBC AUTOMATED: CPT | Performed by: INTERNAL MEDICINE

## 2018-09-18 PROCEDURE — 82784 ASSAY IGA/IGD/IGG/IGM EACH: CPT | Performed by: INTERNAL MEDICINE

## 2018-09-18 PROCEDURE — 36415 COLL VENOUS BLD VENIPUNCTURE: CPT | Performed by: INTERNAL MEDICINE

## 2018-09-18 PROCEDURE — 80197 ASSAY OF TACROLIMUS: CPT | Performed by: INTERNAL MEDICINE

## 2018-09-18 PROCEDURE — 87799 DETECT AGENT NOS DNA QUANT: CPT | Performed by: INTERNAL MEDICINE

## 2018-09-18 PROCEDURE — 80048 BASIC METABOLIC PNL TOTAL CA: CPT | Performed by: INTERNAL MEDICINE

## 2018-09-19 ENCOUNTER — TELEPHONE (OUTPATIENT)
Dept: CARDIOLOGY | Facility: CLINIC | Age: 38
End: 2018-09-19

## 2018-09-19 ENCOUNTER — TELEPHONE (OUTPATIENT)
Dept: TRANSPLANT | Facility: CLINIC | Age: 38
End: 2018-09-19

## 2018-09-19 DIAGNOSIS — Z94.0 KIDNEY TRANSPLANTED: Primary | ICD-10-CM

## 2018-09-19 LAB — IGG SERPL-MCNC: 739 MG/DL (ref 695–1620)

## 2018-09-19 NOTE — TELEPHONE ENCOUNTER
Called pt. to discuss scheduling him for a kidney biopsy.  Pt. is currently on Eliquis.  He verbalized understanding to call his cardiologist and see if he can come off of it for 5 days and return call to sot office and we will schedule biopsy.    Cedrick West MD Hasebroock, Rebecca, RN                   Yes I recommend kidney biopsy     -Viral            Previous Messages       ----- Message -----      From: Erna Bland, RN      Sent: 9/19/2018  11:12 AM        To: Cedrick West MD   Subject: Elevated creatine                                 Hey Dr. West-   I know you had said you would consider a biopsy if his creatinine got to 1.5.  He was 1.51 today.  I called him and he said he's been hydrating well and denies any recent illness.  I am still waiting for his BK to result.  Recent IgG level was 739.     He was weaned off prednisone on 8/20.  Tac levels have been within goal.  He is getting labs every other week.     Wondering if you want to set him up for a biopsy or keep monitoring?  He is taking Eliquis for A flutter.     Thanks!   Betsy

## 2018-09-19 NOTE — TELEPHONE ENCOUNTER
Called to discuss labs with pt.  Pt. out and left a message with his father to have him return call to sot office.      Pt. with a up trending creatinine, today 1.51.  Any recent illness/diarrhea?  Hydrating well?   Per Dr. West, will consider biopsy for creatinine greater than 1.5.  Will discuss plan with Dr. West  Pt. with low level BK viremia, waiting for recent BK PCR and IgG level.    Pt. weaned off of prednisone on 8/20.  Tac levels have been within in goal.

## 2018-09-19 NOTE — TELEPHONE ENCOUNTER
Spoke with pt. Regarding elevated creatinine.  Pt. denies any recent illness or diarrhea.  Reports good hydration.    Plan to discuss with Dr. West.

## 2018-09-19 NOTE — TELEPHONE ENCOUNTER
Patient Call: General      Reason for call:Pt called back-  Call patient back on his cell #    Call back needed? Yes    Return Call Needed  Same as documented in contacts section  When to return call?: Same day: Route [facility]

## 2018-09-19 NOTE — TELEPHONE ENCOUNTER
Date: 9/19/2018    Time of Call: 4:31 PM     Diagnosis:  A flutter s/p DCCV      [ TORB ] Ordering provider: Dr Jaylen George  Order: Ok to hold Apixaban 5 days prior to kidney biopsy.  Resume right after biopsy completed.      Order received by: Gagandeep Corado RN     Follow-up/additional notes: Patient called back with recommendations from Dr George.  No further questions or concerns at this time.

## 2018-09-19 NOTE — TELEPHONE ENCOUNTER
Transplant team would like patient to discontinue Apixaban 5 days prior to his kidney biopsy. Procedure has not yet been scheduled, as they would like to confirm with Dr. George first.

## 2018-09-20 ENCOUNTER — OFFICE VISIT (OUTPATIENT)
Dept: VASCULAR SURGERY | Facility: CLINIC | Age: 38
End: 2018-09-20
Payer: COMMERCIAL

## 2018-09-20 VITALS — OXYGEN SATURATION: 97 % | DIASTOLIC BLOOD PRESSURE: 84 MMHG | SYSTOLIC BLOOD PRESSURE: 143 MMHG | HEART RATE: 70 BPM

## 2018-09-20 DIAGNOSIS — Z94.0 KIDNEY REPLACED BY TRANSPLANT: Primary | ICD-10-CM

## 2018-09-20 NOTE — MR AVS SNAPSHOT
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      Your next 10 appointments already scheduled     Sep 26, 2018   Procedure with Yuval Khan MD   [facility] ([facility] [place])    [address] [place] [address]  5th [facility] [address] [phone]           Located in the Clinics and Surgery Center at [address] [place] [address].   parking is very convenient and highly recommended.  is a $6 flat rate fee.  Both  and self parkers should enter the main arrival plaza from [facility]; parking attendants will direct you based on your parking preference.            Oct 02, 2018 11:15 AM CDT   LAB with  LAB   [facility] ([facility])    [address] [address] [phone]           Please do not eat 10-12 hours before your appointment if you are coming in fasting for labs on lipids, cholesterol, or glucose (sugar). This does not apply to pregnant women. Water, hot tea and black coffee (with nothing added) are okay. Do not drink other fluids, diet soda or chew gum.            Oct 18, 2018 12:45 PM CDT   (Arrive by 12:30 PM)   Return Visit with Yuval Dobbins MD   [facility] Dermatology ([facility] [place])    [address] [place] [address]  3rd [facility] [address] [phone]            Jan 08, 2019 12:00 PM CST   CT CHEST ABDOMEN PELVIS W/O & W CONTRAST with UCCT2   [facility] Imaging  Center CT ([facility] [place])    [address]  1st Floor  [facility] [address] [phone]           How do I prepare for my exam? (Food and drink instructions) To prepare: Do not eat or drink for 2 hours before your exam. If you need to take medicine, you may take it with small sips of water. (We may ask you to take liquid medicine as well.)  How do I prepare for my exam? (Other instructions) Please arrive 30 minutes early for your CT.  Once in the department you might be asked to drink water 15-20 minutes prior to your exam.  If indicated you may be asked to drink an oral contrast in advance of your CT.  If this is the case, the imaging team will let you know or be in contact with you prior to your appointment  Patients over 70 or patients with diabetes or kidney problems: If you haven t had a blood test (creatinine test) within the last 30 days, the Cardiologist/Radiologist may require you to get this test prior to your exam.  If you have diabetes:  Continue to take your metformin medication on the day of your exam  What should I wear: Please wear loose clothing, such as a sweat suit or jogging clothes. Avoid snaps, zippers and other metal. We may ask you to undress and put on a hospital gown.  How long does the exam take: Most scans take less than 20 minutes.  What should I bring: Please bring any scans or X-rays taken at other hospitals, if similar tests were done. Also bring a list of your medicines, including vitamins, minerals and over-the-counter drugs. It is safest to leave personal items at home.  Do I need a : No  is needed.  What do I need to tell my doctor? Be sure to tell your doctor: * If you have any allergies. * If there s any chance you are pregnant. * If you are breastfeeding.  What should I do after the exam: No restrictions, You may resume normal activities.  What is this test: A CT (computed tomography) scan is a series of pictures that allows us  to look inside your body. The scanner creates images of the body in cross sections, much like slices of bread. This helps us see any problems more clearly. You may receive contrast (X-ray dye) before or during your scan. You will be asked to drink the contrast.  Who should I call with questions: If you have any questions, please call the Imaging Department where you will have your exam. Directions, parking instructions, and other information is available on our website, [email or web address].            George 15, 2019 11:15 AM CST   [facility] Draw with  [facility] DRAW   [facility]    [address] [place] [address]  [address]  [facility] [address]   [phone]            George 15, 2019 11:45 AM CST   (Arrive by 11:30 AM)   Return Visit with Carter Prather MD   [facility] ([facility])    [address] [place] [address]  [address]  [facility] [address]   [phone]            Jan 18, 2019 11:00 AM CST   (Arrive by 10:45 AM)   Return Liver Transplant with Laureen Galvan MD   [facility] Hepatology ([facility])    [address] [place] [address]  [address]  [facility] [address]   [phone]            Feb 12, 2019  2:35 PM CST   (Arrive by 2:05 PM)   Return Kidney Transplant with  Kidney/Pancreas Recipient 1   [facility] Nephrology ([facility])    [address] [place] [address]  [address]  [facility] [address]   [phone]              Future tests that were ordered for you today     Open Future Orders        Priority Expected Expires Ordered    Albumin Random Urine Quantitative with Creat Ratio Routine  3/19/2019 9/20/2018    Protein  random urine with Creat Ratio Routine  3/19/2019 9/20/2018    BK virus PCR quantitative Routine  3/19/2019 9/20/2018    Tacrolimus level Routine  3/19/2019 9/20/2018    PRA Donor Specific Antibody STAT  3/19/2019 9/20/2018    Basic metabolic  panel Routine  3/19/2019 9/20/2018    CBC with platelets differential Routine  3/19/2019 9/20/2018    INR Routine  3/19/2019 9/20/2018    ABO/Rh type and screen Routine  3/19/2019 9/20/2018    Routine UA with microscopic Routine  3/19/2019 9/20/2018    BK virus PCR quantitative (Serum) Routine 9/19/2018 10/19/2018 9/19/2018            Who to contact     Please call your clinic at [phone] to:    Ask questions about your health    Make or cancel appointments    Discuss your medicines    Learn about your test results    Speak to your doctor            Additional Information About Your Visit        [employer] Information     [employer] gives you secure access to your electronic health record. If you see a primary care provider, you can also send messages to your care team and make appointments. If you have questions, please call your primary care clinic.  If you do not have a primary care provider, please call [phone] and they will assist you.      [employer] is an electronic gateway that provides easy, online access to your medical records. With [employer], you can request a clinic appointment, read your test results, renew a prescription or communicate with your care team.     To access your existing account, please contact your [facility] or call [phone] for assistance.        Care EveryWhere ID     This is your Care EveryWhere ID. This could be used by other organizations to access your [facility] medical records  [id number]        Your Vitals Were     Pulse Pulse Oximetry                70 97%           Blood Pressure from Last 3 Encounters:   09/20/18 143/84   08/17/18 135/81   08/13/18 137/90    Weight from Last 3 Encounters:   08/17/18 190 lb 11.2 oz   08/13/18 192 lb 14.4 oz   08/10/18 191 lb 6.4 oz              Today, you had the following     No orders found for display       Primary Care Provider    [facility]       No address on file        Equal  Access to Services     : Hadii steffany jurado jaydon Barroso, waraoulda luqadaha, qaybta kaalsebastián adameltoncindi, robina ortiz turnersnowannika sibley. So [facility] [phone].    ATENCIÓN: Si lulu montgomery, tiene a laura disposición servicios gratuitos de asistencia lingüística. Llame al [phone].    We comply with applicable federal civil rights laws and [place] laws. We do not discriminate on the basis of race, color, national origin, age, disability, sex, sexual orientation, or gender identity.            Thank you!     Thank you for choosing [facility]  for your care. Our goal is always to provide you with excellent care. Hearing back from our patients is one way we can continue to improve our services. Please take a few minutes to complete the written survey that you may receive in the mail after your visit with us. Thank you!             Your Updated Medication List - Protect others around you: Learn how to safely use, store and throw away your medicines at www.disposemymeds.org.          This list is accurate as of 9/20/18  3:54 PM.  Always use your most recent med list.                   Brand Name Dispense Instructions for use Diagnosis    amLODIPine 10 MG tablet    NORVASC    90 tablet    TAKE ONE TABLET BY MOUTH EVERY DAY    HTN (hypertension), Liver replaced by transplant (H), S/P kidney transplant       apixaban ANTICOAGULANT 5 MG tablet    ELIQUIS    180 tablet    Take 1 tablet (5 mg) by mouth 2 times daily    Atrial flutter, unspecified type (H)       * ketoconazole 2 % cream    NIZORAL    60 g    Twice daily to areas of rash on face    Dermatitis, seborrheic       * ketoconazole 2 % shampoo    NIZORAL    120 mL    Use as a foaming face wash in shower daily    Dermatitis, seborrheic       levothyroxine 137 MCG tablet    SYNTHROID/LEVOTHROID    96 tablet    Take 1 tablet by mouth Monday - Saturday  and 1.5 tablets on Sunday    Papillary thyroid carcinoma (H), Postoperative  hypothyroidism       lisinopril 5 MG tablet    PRINIVIL/ZESTRIL    90 tablet    TAKE ONE TABLET BY MOUTH EVERY DAY    HTN (hypertension), Liver replaced by transplant (H), S/P kidney transplant       magnesium oxide 400 MG tablet    MAG-OX    120 tablet    TAKE ONE TABLET BY MOUTH TWICE A DAY    Hypomagnesemia       metoprolol succinate 25 MG 24 hr tablet    TOPROL-XL    90 tablet    Take 1 tablet (25 mg) by mouth daily    Atrial flutter, unspecified type (H)       mycophenolate 250 MG capsule    GENERIC EQUIVALENT    60 capsule    Take 1 capsule (250 mg) by mouth 2 times daily    Kidney replaced by transplant       oxyCODONE-acetaminophen 5-325 MG per tablet    PERCOCET    10 tablet    Take 1 tablet by mouth every 4 hours as needed for pain (moderate to severe)    Postoperative pain       predniSONE 5 MG tablet    DELTASONE    90 tablet    Take 2.5 mg once daily for 14 days, then stop    Liver replaced by transplant (H), S/P kidney transplant       sulfamethoxazole-trimethoprim 400-80 MG per tablet    BACTRIM/SEPTRA    30 tablet    Take 1 tablet by mouth daily    Liver replaced by transplant (H), S/P kidney transplant       * tacrolimus 0.5 MG capsule    GENERIC EQUIVALENT    60 capsule    TAKE ONE CAPSULE BY MOUTH TWICE A DAY. (TOTAL DAILY DOSE IS 1.5MG TWO TIMES A DAY).    S/P kidney transplant       * tacrolimus 1 MG capsule    GENERIC EQUIVALENT    60 capsule    Take 1 capsule (1 mg) by mouth 2 times daily    Liver replaced by transplant (H), S/P kidney transplant, HTN (hypertension)       * Notice:  This list has 4 medication(s) that are the same as other medications prescribed for you. Read the directions carefully, and ask your doctor or other care provider to review them with you.

## 2018-09-20 NOTE — PROGRESS NOTES
CC: follow up visit    HPI: This is a postop visit for this 39 YO WM who underwent excision of a thrombosed right brachial artery pseudoaneurysm on 8/17. The patient has been doing well since discharge and reports that the wound is clean and flat. There is no history of bleeding, infection or other problem    PE:  /84  Pulse 70  SpO2 97%  Right upper extremity: antecubital incision well healed, no evidence of infection or other complication. Brachial artery and radial artery pulses 2+.    Pathology report reviewed    Assess: S/P excision of a large, thrombosed right brachial artery pseudoaneurysm. Now doing well.    Plan: RTC prn

## 2018-09-20 NOTE — TELEPHONE ENCOUNTER
Pt. returned call stating that Dr. George is ok for him to hold Eliquis for 5 days.  Reviewed instructions below regarding biopsy.  Pt. verbalized understanding.    Transplant Coordinator Renal Biopsy Communication    Call placed to Cricket Chen to discuss indication for kidney transplant biopsy per Dr. West.     Indication for transplant renal biopsy: Unclear etiology of up trending creatinine (1.51)  Laterality: right  Date of biopsy: 9/26/18    Patient location within 70 miles of [facility]: Yes.     Cricket Chen's medication list was reviewed.   Anticoagulant: Eliquis   Ibuprofen: No.  Fish Oil:  No.  Medications held: Eliquis- Pt. Verbalized understanding to hold starting 9/21/18    Recent blood pressure readings are WNL or not applicable. Instructed to take medication, especially blood pressure medications, before arriving to the Clinic and Surgery Center at 0600 day of procedure.     Procedure expectations and duration of stay discussed. Expressed pt can expect a phone call from LPN/MA to confirm biopsy date/time/location/directions/review of medications. Pt has no additional questions at this time. Transplant Office phone number given to pt for future questions.      Erna Bland  Kidney/Pancreas Transplant Coordinator  [phone] option 5

## 2018-09-20 NOTE — TELEPHONE ENCOUNTER
Gagandeep Corado RN Hasebroock, Rebecca, RN Hello,     Yes, the patient called yesterday asking.  I spoke to Dr George and it is ok to hold the Elquis 5 days prior.  The patient is aware as well.     Thanks!     Gagandeep            Previous Messages       ----- Message -----      From: Erna Bland RN      Sent: 9/20/2018  10:16 AM        To: Jaylen George MD, Gagandeep Corado RN   Subject: Eliquis                                           Dr. West has decided that Isidro needs to have a kidney biopsy.  Are we able to hold his Elquis for 5 days for this procedure?       Thanks!   Betsy Bland, RN, BSN   Post Kidney/Pancreas Transplant Coordinator   [phone]

## 2018-09-20 NOTE — LETTER
9/20/2018       RE: Cricket Chen  [address]  [facility] [address]     Dear Colleague,    Thank you for referring your patient, Cricket Chen, to the [facility] at [facility]. Please see a copy of my visit note below.    CC: follow up visit    HPI: This is a postop visit for this 39 YO WM who underwent excision of a thrombosed right brachial artery pseudoaneurysm on 8/17. The patient has been doing well since discharge and reports that the wound is clean and flat. There is no history of bleeding, infection or other problem    PE:  /84  Pulse 70  SpO2 97%  Right upper extremity: antecubital incision well healed, no evidence of infection or other complication. Brachial artery and radial artery pulses 2+.    Pathology report reviewed    Assess: S/P excision of a large, thrombosed right brachial artery pseudoaneurysm. Now doing well.    Plan: RTC prn    Again, thank you for allowing me to participate in the care of your patient.      Sincerely,    John Payton MD

## 2018-09-20 NOTE — TELEPHONE ENCOUNTER
LPN/MA  Renal Biopsy Communication    Call to pt to confirm renal biopsy procedure. Biopsy orders entered and patient aware of date 9/26/2018 @ 8 am, [place] [facility] [place] to arrive at 6:00AM.     No need to be NPO.      Discussed anticoagulants (i.e. fish oil, ASA, Plavix, Coumadin, Ibuprofen). Pt confirms use of anticoagulants. Eliquis patient will begin holding 9/21/2018    Take all medicine before arrival for biopsy and bring all medicine bottles with.      Report to 1st floor lab, then 5th floor for biopsy.      Use [employer] and bring form of entertainment.     Discussed with patient need to stay overnight locally evening of procedure if live more than 70 miles away.    Call placed to scheduling at [phone] to schedule and confirm biopsy date/time    Lab appointment scheduled for morning of biopsy.      Patient v\u of instructions above. Orders placed.

## 2018-09-21 LAB
BKV DNA # SPEC NAA+PROBE: ABNORMAL COPIES/ML
BKV DNA SPEC NAA+PROBE-LOG#: 4.1 LOG COPIES/ML
SPECIMEN SOURCE: ABNORMAL

## 2018-09-26 ENCOUNTER — SURGERY (OUTPATIENT)
Age: 38
End: 2018-09-26

## 2018-09-26 ENCOUNTER — RESULTS ONLY (OUTPATIENT)
Dept: OTHER | Facility: CLINIC | Age: 38
End: 2018-09-26

## 2018-09-26 ENCOUNTER — HOSPITAL ENCOUNTER (OUTPATIENT)
Facility: AMBULATORY SURGERY CENTER | Age: 38
End: 2018-09-26
Attending: INTERNAL MEDICINE
Payer: COMMERCIAL

## 2018-09-26 ENCOUNTER — DOCUMENTATION ONLY (OUTPATIENT)
Dept: TRANSPLANT | Facility: CLINIC | Age: 38
End: 2018-09-26

## 2018-09-26 ENCOUNTER — RADIANT APPOINTMENT (OUTPATIENT)
Dept: RADIOLOGY | Facility: AMBULATORY SURGERY CENTER | Age: 38
End: 2018-09-26
Attending: INTERNAL MEDICINE
Payer: COMMERCIAL

## 2018-09-26 VITALS
DIASTOLIC BLOOD PRESSURE: 60 MMHG | RESPIRATION RATE: 18 BRPM | SYSTOLIC BLOOD PRESSURE: 108 MMHG | TEMPERATURE: 97.8 F | HEART RATE: 64 BPM | OXYGEN SATURATION: 97 %

## 2018-09-26 DIAGNOSIS — Z94.0 KIDNEY TRANSPLANTED: ICD-10-CM

## 2018-09-26 DIAGNOSIS — Z94.0 KIDNEY REPLACED BY TRANSPLANT: ICD-10-CM

## 2018-09-26 LAB
ABO + RH BLD: NORMAL
ABO + RH BLD: NORMAL
ALBUMIN UR-MCNC: NEGATIVE MG/DL
ANION GAP SERPL CALCULATED.3IONS-SCNC: 6 MMOL/L (ref 3–14)
APPEARANCE UR: CLEAR
BASOPHILS # BLD AUTO: 0 10E9/L (ref 0–0.2)
BASOPHILS NFR BLD AUTO: 0.7 %
BILIRUB UR QL STRIP: NEGATIVE
BLD GP AB SCN SERPL QL: NORMAL
BLOOD BANK CMNT PATIENT-IMP: NORMAL
BUN SERPL-MCNC: 18 MG/DL (ref 7–30)
CALCIUM SERPL-MCNC: 9.3 MG/DL (ref 8.5–10.1)
CHLORIDE SERPL-SCNC: 106 MMOL/L (ref 94–109)
CO2 SERPL-SCNC: 28 MMOL/L (ref 20–32)
COLOR UR AUTO: YELLOW
CREAT SERPL-MCNC: 1.42 MG/DL (ref 0.66–1.25)
CREAT UR-MCNC: 121 MG/DL
DIFFERENTIAL METHOD BLD: ABNORMAL
EOSINOPHIL # BLD AUTO: 0 10E9/L (ref 0–0.7)
EOSINOPHIL NFR BLD AUTO: 0.9 %
ERYTHROCYTE [DISTWIDTH] IN BLOOD BY AUTOMATED COUNT: 12.3 % (ref 10–15)
GFR SERPL CREATININE-BSD FRML MDRD: 56 ML/MIN/1.7M2
GLUCOSE SERPL-MCNC: 99 MG/DL (ref 70–99)
GLUCOSE UR STRIP-MCNC: NEGATIVE MG/DL
HCT VFR BLD AUTO: 46.2 % (ref 40–53)
HGB BLD-MCNC: 14.6 G/DL (ref 13.3–17.7)
HGB BLD-MCNC: 15.4 G/DL (ref 13.3–17.7)
HGB UR QL STRIP: NEGATIVE
IMM GRANULOCYTES # BLD: 0 10E9/L (ref 0–0.4)
IMM GRANULOCYTES NFR BLD: 0.5 %
INR PPP: 1 (ref 0.86–1.14)
KETONES UR STRIP-MCNC: NEGATIVE MG/DL
LEUKOCYTE ESTERASE UR QL STRIP: NEGATIVE
LYMPHOCYTES # BLD AUTO: 0.7 10E9/L (ref 0.8–5.3)
LYMPHOCYTES NFR BLD AUTO: 16.8 %
MCH RBC QN AUTO: 29.9 PG (ref 26.5–33)
MCHC RBC AUTO-ENTMCNC: 33.3 G/DL (ref 31.5–36.5)
MCV RBC AUTO: 90 FL (ref 78–100)
MICROALBUMIN UR-MCNC: 7 MG/L
MICROALBUMIN/CREAT UR: 5.76 MG/G CR (ref 0–17)
MONOCYTES # BLD AUTO: 0.4 10E9/L (ref 0–1.3)
MONOCYTES NFR BLD AUTO: 8.8 %
NEUTROPHILS # BLD AUTO: 3.2 10E9/L (ref 1.6–8.3)
NEUTROPHILS NFR BLD AUTO: 72.3 %
NITRATE UR QL: NEGATIVE
NRBC # BLD AUTO: 0 10*3/UL
NRBC BLD AUTO-RTO: 0 /100
PH UR STRIP: 6 PH (ref 5–7)
PLATELET # BLD AUTO: 139 10E9/L (ref 150–450)
POTASSIUM SERPL-SCNC: 3.9 MMOL/L (ref 3.4–5.3)
PRA DONOR SPECIFIC ABY: NORMAL
PROT UR-MCNC: 0.11 G/L
PROT/CREAT 24H UR: 0.09 G/G CR (ref 0–0.2)
RBC # BLD AUTO: 5.15 10E12/L (ref 4.4–5.9)
RBC #/AREA URNS AUTO: 0 /HPF (ref 0–2)
SODIUM SERPL-SCNC: 140 MMOL/L (ref 133–144)
SOURCE: NORMAL
SP GR UR STRIP: 1.01 (ref 1–1.03)
SPECIMEN EXP DATE BLD: NORMAL
TACROLIMUS BLD-MCNC: 6.4 UG/L (ref 5–15)
TME LAST DOSE: 2200 H
UROBILINOGEN UR STRIP-MCNC: 0 MG/DL (ref 0–2)
WBC # BLD AUTO: 4.4 10E9/L (ref 4–11)
WBC #/AREA URNS AUTO: <1 /HPF (ref 0–5)

## 2018-09-26 RX ORDER — HEPARIN SODIUM,PORCINE 10 UNIT/ML
5-10 VIAL (ML) INTRAVENOUS EVERY 24 HOURS
Status: DISCONTINUED | OUTPATIENT
Start: 2018-09-26 | End: 2018-09-27 | Stop reason: HOSPADM

## 2018-09-26 RX ORDER — HEPARIN SODIUM (PORCINE) LOCK FLUSH IV SOLN 100 UNIT/ML 100 UNIT/ML
5 SOLUTION INTRAVENOUS
Status: DISCONTINUED | OUTPATIENT
Start: 2018-09-26 | End: 2018-09-27 | Stop reason: HOSPADM

## 2018-09-26 RX ORDER — HEPARIN SODIUM,PORCINE 10 UNIT/ML
5-10 VIAL (ML) INTRAVENOUS
Status: DISCONTINUED | OUTPATIENT
Start: 2018-09-26 | End: 2018-09-27 | Stop reason: HOSPADM

## 2018-09-26 RX ORDER — LIDOCAINE 40 MG/G
CREAM TOPICAL
Status: DISCONTINUED | OUTPATIENT
Start: 2018-09-26 | End: 2018-09-27 | Stop reason: HOSPADM

## 2018-09-26 RX ADMIN — HEPARIN SODIUM (PORCINE) LOCK FLUSH IV SOLN 100 UNIT/ML 5 ML: 100 SOLUTION at 12:48

## 2018-09-26 RX ADMIN — Medication 10 ML: at 08:25

## 2018-09-26 NOTE — PROCEDURES
Nephrology Procedure H&P  09/26/2018     Assessment & Recommendations:   1. DDKT - baseline creatinine ~ 1.2- 1.5, which has trended up. No proteinuria. Will plan on kidney transplant biopsy to evaluate for etiology of elevated creatinine.  Possible causes include, but not limited to, polyoma virus/ rejection. Will make no changes in immunosuppression.    Transplant History:  Transplant: 5/11/2016 (Kidney), 5/10/2016 (Liver)        Donor Class:   Crossmatch at time of Transplant:    DSA at time of Transplant:  Yes  Present Maintenance Immunosuppression:  Tacrolimus - extended release, Mycophenolate mofetil and Prednisone  Baseline creatinine:  1.2- 1.5  Latest DSA lab date:  PRA:  Class I:   SA1 Comments   Date Value Ref Range Status   12/18/2017   Final    Test performed by modified procedure. Serum heat inactivated and tested by   a modified ([place]) protocol including fetal calf serum addition.   High-risk, mfi >3,000. Mod-risk, mfi 500-3,000.          Class II:    SA2 Comments   Date Value Ref Range Status   12/18/2017   Final    Test performed by modified procedure. Serum heat inactivated and tested by   a modified ([place]) protocol including fetal calf serum addition.   High-risk, mfi >3,000. Mod-risk, mfi 500-3,000.        Biopsy:  No  Rejection History:  No  Significant Complications: BK viremia  Transplant Coordinator: Erna Monson   Transplant Office Phone Number:  [phone]     2. Hypertension - well controlled at target of less than 140/90. No changes.  3. Hemoglobin: normal. Will be repeated 4 hours post biopsy to ensure stability.     Reason for Visit:  Mr. Chen is here for potential kidney transplant biopsy.    History of Present Illness:  Cricket Chen is a 38 year old male with ESKD from [facility] and is status post DDKT on 5/2016. He is presenting for an elective renal biopsy for elevated creatinine which is mildly improved today.     He has no complaints. Appetite  is good with stable weight.  No nausea, vomiting or diarrhea.  No fever, sweats or chills.  No leg swelling.  No pain or burning with urination.    He has held his eliquis for 5 days.       Pain Over Kidney Tx:  No Pain or Burning with Urination:  No  Gross Hematuria:  No  Taking NSAIDs:  No    Home BP: Not checked    Review of Systems:  A comprehensive review of systems was obtained and negative, except as noted in the History of Present Illness or Active Medical Problems.    Active Medical Problems:  Patient Active Problem List    Diagnosis     Post-transplant lymphoproliferative disorder (H)     Dermatitis, seborrheic     Arm mass, right     Postoperative hypothyroidism     Atrial fibrillation (H)     Hypertension secondary to other renal disorders     Papillary thyroid carcinoma (H)     Aftercare following organ transplant     Chronic pain syndrome     Pain management contract signed     Hypomagnesemia     Secondary renal hyperparathyroidism (H)     Immunosuppressed status (H)     Liver replaced by transplant (H)     Kidney replaced by transplant     Patient [facility]     Pain medication agreement     Thrombocytopenia (H)     History of transposition of great vessels     Depression     History of alcohol abuse     Alcoholic hepatitis     Insomnia     Esophageal varices (H)     Atrial flutter (H)     Encounter for palliative care     Acute alcoholic liver disease     Complete transposition of great vessels     Advance directive discussed with patient     Alcohol dependence (H)     Current Medications:  Current Outpatient Prescriptions   Medication Sig Dispense Refill     amLODIPine (NORVASC) 10 MG tablet TAKE ONE TABLET BY MOUTH EVERY DAY 90 tablet 3     ketoconazole (NIZORAL) 2 % cream Twice daily to areas of rash on face 60 g 1     ketoconazole (NIZORAL) 2 % shampoo Use as a foaming face wash in shower daily 120 mL 3     levothyroxine  (SYNTHROID/LEVOTHROID) 137 MCG tablet Take 1 tablet by mouth Monday - Saturday  and 1.5 tablets on Sunday 96 tablet 3     lisinopril (PRINIVIL/ZESTRIL) 5 MG tablet TAKE ONE TABLET BY MOUTH EVERY DAY 90 tablet 3     magnesium oxide (MAG-OX) 400 MG tablet TAKE ONE TABLET BY MOUTH TWICE A  tablet 11     metoprolol succinate (TOPROL-XL) 25 MG 24 hr tablet Take 1 tablet (25 mg) by mouth daily 90 tablet 3     mycophenolate (GENERIC EQUIVALENT) 250 MG capsule Take 1 capsule (250 mg) by mouth 2 times daily 60 capsule 11     oxyCODONE-acetaminophen (PERCOCET) 5-325 MG per tablet Take 1 tablet by mouth every 4 hours as needed for pain (moderate to severe) 10 tablet 0     sulfamethoxazole-trimethoprim (BACTRIM/SEPTRA) 400-80 MG per tablet Take 1 tablet by mouth daily 30 tablet 11     tacrolimus (GENERIC EQUIVALENT) 0.5 MG capsule TAKE ONE CAPSULE BY MOUTH TWICE A DAY. (TOTAL DAILY DOSE IS 1.5MG TWO TIMES A DAY). 60 capsule 5     tacrolimus (GENERIC EQUIVALENT) 1 MG capsule Take 1 capsule (1 mg) by mouth 2 times daily 60 capsule 11     apixaban ANTICOAGULANT (ELIQUIS) 5 MG tablet Take 1 tablet (5 mg) by mouth 2 times daily 180 tablet 3     predniSONE (DELTASONE) 5 MG tablet Take 2.5 mg once daily for 14 days, then stop 90 tablet 3     Vitals:  /74 (Cuff Size: Adult Regular)  Pulse 64  Temp 96  F (35.6  C) (Temporal)  Resp 18  SpO2 96%    Physical Exam:   GENERAL APPEARANCE: alert and no distress  HENT: mouth without ulcers or lesions  PULM: lungs clear to auscultation, equal air movement  CV: regular rhythm, normal rate     - No LE edema bilaterally  GI: soft, nontender, bowel sounds are normal  MS: no evidence of inflammation in joints, no muscle tenderness  TX KIDNEY: nontender    Labs:   All labs reviewed by me  Recent Results (from the past 8 hour(s))   Basic metabolic panel    Collection Time: 09/26/18  6:25 AM   Result Value Ref Range    Sodium 140 133 - 144 mmol/L    Potassium 3.9 3.4 - 5.3 mmol/L     Chloride 106 94 - 109 mmol/L    Carbon Dioxide 28 20 - 32 mmol/L    Anion Gap 6 3 - 14 mmol/L    Glucose 99 70 - 99 mg/dL    Urea Nitrogen 18 7 - 30 mg/dL    Creatinine 1.42 (H) 0.66 - 1.25 mg/dL    GFR Estimate 56 (L) >60 mL/min/1.7m2    GFR Estimate If Black 67 >60 mL/min/1.7m2    Calcium 9.3 8.5 - 10.1 mg/dL   CBC with platelets differential    Collection Time: 09/26/18  6:25 AM   Result Value Ref Range    WBC 4.4 4.0 - 11.0 10e9/L    RBC Count 5.15 4.4 - 5.9 10e12/L    Hemoglobin 15.4 13.3 - 17.7 g/dL    Hematocrit 46.2 40.0 - 53.0 %    MCV 90 78 - 100 fl    MCH 29.9 26.5 - 33.0 pg    MCHC 33.3 31.5 - 36.5 g/dL    RDW 12.3 10.0 - 15.0 %    Platelet Count 139 (L) 150 - 450 10e9/L    Diff Method Automated Method     % Neutrophils 72.3 %    % Lymphocytes 16.8 %    % Monocytes 8.8 %    % Eosinophils 0.9 %    % Basophils 0.7 %    % Immature Granulocytes 0.5 %    Nucleated RBCs 0 0 /100    Absolute Neutrophil 3.2 1.6 - 8.3 10e9/L    Absolute Lymphocytes 0.7 (L) 0.8 - 5.3 10e9/L    Absolute Monocytes 0.4 0.0 - 1.3 10e9/L    Absolute Eosinophils 0.0 0.0 - 0.7 10e9/L    Absolute Basophils 0.0 0.0 - 0.2 10e9/L    Abs Immature Granulocytes 0.0 0 - 0.4 10e9/L    Absolute Nucleated RBC 0.0    INR    Collection Time: 09/26/18  6:25 AM   Result Value Ref Range    INR 1.00 0.86 - 1.14   ABO/Rh type and screen    Collection Time: 09/26/18  6:26 AM   Result Value Ref Range    ABO PENDING     Antibody Screen PENDING     Test Valid Only At          [facility]    Specimen Expires 09/29/2018    Routine UA with microscopic    Collection Time: 09/26/18  8:03 AM   Result Value Ref Range    Color Urine Yellow     Appearance Urine Clear     Glucose Urine Negative NEG^Negative mg/dL    Bilirubin Urine Negative NEG^Negative    Ketones Urine Negative NEG^Negative mg/dL    Specific Gravity Urine 1.012 1.003 - 1.035    Blood Urine Negative NEG^Negative    pH Urine 6.0 5.0 - 7.0 pH    Protein  Albumin Urine Negative NEG^Negative mg/dL    Urobilinogen mg/dL 0.0 0.0 - 2.0 mg/dL    Nitrite Urine Negative NEG^Negative    Leukocyte Esterase Urine Negative NEG^Negative    Source Midstream Urine     WBC Urine <1 0 - 5 /HPF    RBC Urine 0 0 - 2 /HPF        Mariely Matias MD    Attestation:  This patient has been seen and evaluated by me, Yuval Khan MD.  I have reviewed the note and agree with plan of care as documented by the fellow.

## 2018-09-26 NOTE — DISCHARGE INSTRUCTIONS
[facility]  Home Care Following Kidney Biopsy  ACTIVITY: NO heavy lifting (weight greater than 10 pounds) for 1 week. NO strenuous activity for 24 hours; relax and take it easy.     DIET: Resume your regular diet and drink plenty of fluids UNLESS you are fluid restricted.    DRAINAGE: There should be minimal drainage from the biopsy site. If bleeding soaks the dressing, you should lie down and apply pressure to the site for a minimum of 10 minutes. Call one of the numbers below if experienced bleeding that soaked the dressing.     DRESSING: Keep the dressing in place for 24 hours to prevent the site from re-opening and bleeding.     SEDATION: IF you received sedation medications, DO NOT drive or operate heavy machinery, DO NOT drink alcoholic beverages, and DO NOT make important legal decisions.    CALL ONE OF THE NUMBERS BELOW IF YOU EXPERIENCE ANY ONE OF THE FOLLOWING:      Excessive bleeding or drainage    Excessive swelling, redness, or tenderness at the site    Fever above 100.5 F, orally    Severe pain    Drainage that is green, yellow, thick white, or has a bad odor    If you havebloody urine or see bloody clots in your urine     Emergency Department: [phone] (open 24 hours)  Transplant Center: [phone] (open 7:00 AM - 6:30 PM)     Resume Eliquis tomorrow

## 2018-09-26 NOTE — IP AVS SNAPSHOT
MRN:[id number]                      After Visit Summary   9/26/2018    Cricket Chen    MRN: [id number]           Thank you!     Thank you for choosing [facility] for your care. Our goal is always to provide you with excellent care. Hearing back from our patients is one way we can continue to improve our services. Please take a few minutes to complete the written survey that you may receive in the mail after you visit with us. Thank you!        Patient Information     Date Of Birth          1980        About your hospital stay     You were admitted on:  September 26, 2018 [facility]    You were discharged on:  September 26, 2018       Who to Call     For medical emergencies, please call 911.  For non-urgent questions about your medical care, please call your primary care provider or clinic, None  For questions related to your surgery, please call your surgery clinic        Attending Provider     Provider Yuval Pierre MD Nephrology       Primary Care Provider    [facility]      After Care Instructions     Discharge Instructions       ACTIVITY:  NO heavy lifting (weight greater than 10 pounds) for 1 week. NO strenuous activity for 24 hours; relax and take it easy.     DIET:  Resume your regular diet and drink plenty of fluids UNLESS you are fluid restricted.    DRAINAGE: There should be minimal drainage from the biopsy site. If bleeding soaks the dressing, you should lie down and apply pressure to the site for a minimum of 10 minutes.  If the bleeding persists, call the emergency phone numbers below.  Whether bleeding persists or not, you should report the bleeding to one of the numbers below.     DRESSING: Keep the dressing dry and in place for 24 hours to prevent the site from reopening and bleeding.    SEDATION: If you received sedation medications, DO NOT drive or operate heavy machinery,  DO NOT drink alcoholic beverages, or DO NOT make important legal decisions.    NOTIFY PROVIDER for the following signs/symptoms:  Excessive bleeding or drainage.  Excessive swelling, redness, or tenderness at the site.  Fever above 101.5* F  Severe Pain.  Drainage that is green, yellow,  thick white or has a bad odor.  Passage of bloody urine or clots after you are discharged.    EMERGENCY CONTACT NUMBERS:  Emergency Department: [phone]  Transplant Center: [phone]  7:00 AM-6:30 PM                  Your next 10 appointments already scheduled     Oct 02, 2018 11:15 AM CDT   LAB with [facility]   [facility] ([facility])    [address] [address]   [phone]           Please do not eat 10-12 hours before your appointment if you are coming in fasting for labs on lipids, cholesterol, or glucose (sugar). This does not apply to pregnant women. Water, hot tea and black coffee (with nothing added) are okay. Do not drink other fluids, diet soda or chew gum.            Oct 16, 2018 11:15 AM CDT   LAB with FZ LAB   [facility] ([facility])    [address] [address]   [phone]           Please do not eat 10-12 hours before your appointment if you are coming in fasting for labs on lipids, cholesterol, or glucose (sugar). This does not apply to pregnant women. Water, hot tea and black coffee (with nothing added) are okay. Do not drink other fluids, diet soda or chew gum.            Oct 18, 2018 12:45 PM CDT   (Arrive by 12:30 PM)   Return Visit with Yuval Dobbins MD   [facility] ([facility])    [address]  3rd Floor  [facility] [id number] [phone]            Oct 30, 2018 11:15 AM CDT   LAB with FZ LAB   [facility] ([facility])    [address] [place] [address]   [phone]           Please do not eat 10-12 hours before  your appointment if you are coming in fasting for labs on lipids, cholesterol, or glucose (sugar). This does not apply to pregnant women. Water, hot tea and black coffee (with nothing added) are okay. Do not drink other fluids, diet soda or chew gum.            Jan 08, 2019 12:00 PM CST   CT CHEST ABDOMEN PELVIS W/O & W CONTRAST with UCCT2   [facility] ([facility])    [address]  1st Floor  [facility] [address] [phone]           How do I prepare for my exam? (Food and drink instructions) To prepare: Do not eat or drink for 2 hours before your exam. If you need to take medicine, you may take it with small sips of water. (We may ask you to take liquid medicine as well.)  How do I prepare for my exam? (Other instructions) Please arrive 30 minutes early for your CT.  Once in the department you might be asked to drink water 15-20 minutes prior to your exam.  If indicated you may be asked to drink an oral contrast in advance of your CT.  If this is the case, the imaging team will let you know or be in contact with you prior to your appointment  Patients over 70 or patients with diabetes or kidney problems: If you haven t had a blood test (creatinine test) within the last 30 days, the Cardiologist/Radiologist may require you to get this test prior to your exam.  If you have diabetes:  Continue to take your metformin medication on the day of your exam  What should I wear: Please wear loose clothing, such as a sweat suit or jogging clothes. Avoid snaps, zippers and other metal. We may ask you to undress and put on a hospital gown.  How long does the exam take: Most scans take less than 20 minutes.  What should I bring: Please bring any scans or X-rays taken at other hospitals, if similar tests were done. Also bring a list of your medicines, including vitamins, minerals and over-the-counter drugs. It is safest to leave personal items at home.  Do I need a :  No  is needed.  What do I need to tell my doctor? Be sure to tell your doctor: * If you have any allergies. * If there s any chance you are pregnant. * If you are breastfeeding.  What should I do after the exam: No restrictions, You may resume normal activities.  What is this test: A CT (computed tomography) scan is a series of pictures that allows us to look inside your body. The scanner creates images of the body in cross sections, much like slices of bread. This helps us see any problems more clearly. You may receive contrast (X-ray dye) before or during your scan. You will be asked to drink the contrast.  Who should I call with questions: If you have any questions, please call the Imaging Department where you will have your exam. Directions, parking instructions, and other information is available on our website, [email or web address].            George 15, 2019 11:15 AM CST   [facility] Draw with  [facility] DRAW   [facility] Draw ([facility])    [address] [place] [address]  [address]  [facility] [address]   [phone]            George 15, 2019 11:45 AM CST   (Arrive by 11:30 AM)   Return Visit with Carter Prather MD   [facility] ([facility])    [address] [place] [address]  [address]  [facility] [address]   [phone]            Jan 18, 2019 11:00 AM CST   (Arrive by 10:45 AM)   Return Liver Transplant with Laureen Galvan MD   [facility] ([facility])    [address] [place] [address]  [address]  [facility] [address]   [phone]            Feb 12, 2019  2:35 PM CST   (Arrive by 2:05 PM)   Return Kidney Transplant with  Kidney/Pancreas Recipient 1   [facility]ph[facility] ([facility])    [address] [place] [address]  [address]  [facility] [address]   [phone]              Further instructions from your care team       [facility]  Home Care Following Kidney Biopsy  ACTIVITY: NO heavy lifting (weight greater than 10 pounds) for 1 week. NO strenuous activity for 24 hours; relax and take it easy.     DIET: Resume your regular diet and drink plenty of fluids UNLESS you are fluid restricted.    DRAINAGE: There should be minimal drainage from the biopsy site. If bleeding soaks the dressing, you should lie down and apply pressure to the site for a minimum of 10 minutes. Call one of the numbers below if experienced bleeding that soaked the dressing.     DRESSING: Keep the dressing in place for 24 hours to prevent the site from re-opening and bleeding.     SEDATION: IF you received sedation medications, DO NOT drive or operate heavy machinery, DO NOT drink alcoholic beverages, and DO NOT make important legal decisions.    CALL ONE OF THE NUMBERS BELOW IF YOU EXPERIENCE ANY ONE OF THE FOLLOWING:      Excessive bleeding or drainage    Excessive swelling, redness, or tenderness at the site    Fever above 100.5 F, orally    Severe pain    Drainage that is green, yellow, thick white, or has a bad odor    If you havebloody urine or see bloody clots in your urine     Emergency Department: [phone] (open 24 hours)  Transplant Center: [phone] (open 7:00 AM - 6:30 PM)     Resume Eliquis tomorrow       Pending Results     Date and Time Order Name Status Description    9/26/2018 0902 Hemoglobin In process     9/26/2018 0840 Surgical pathology exam In process     9/26/2018 0617 BK VIRUS PCR QUANTITATIVE In process     9/26/2018 0608 PRA DONOR SPECIFIC ANTIBODY In process             Admission Information     Date & Time Provider Department Dept. Phone    9/26/2018 Yuval Khan MD [facility] [phone]      Your Vitals Were     Blood Pressure Pulse Temperature Respirations Pulse Oximetry       120/68 (Cuff Size: Adult Regular) 64 97.6  F (36.4  C) (Temporal) 16 98%       MyChart Information     MyChart gives you  secure access to your electronic health record. If you see a primary care provider, you can also send messages to your care team and make appointments. If you have questions, please call your primary care clinic.  If you do not have a primary care provider, please call [phone] and they will assist you.      [employer] is an electronic gateway that provides easy, online access to your medical records. With [employer], you can request a clinic appointment, read your test results, renew a prescription or communicate with your care team.     To access your existing account, please contact your [facility] or call [phone] for assistance.        Care EveryWhere ID     This is your Care EveryWhere ID. This could be used by other organizations to access your [facility] medical records  [id number]        Equal Access to Services     CLAY VIDES : Ana M Barroso, waaxcindi luqadaha, qaybta kaalmacindi mccann, robina sibley. So [facility] [phone].    ATENCIÓN: Si habla español, tiene a laura disposición servicios gratuitos de asistencia lingüística. Llame al [phone].    We comply with applicable federal civil rights laws and [place] laws. We do not discriminate on the basis of race, color, national origin, age, disability, sex, sexual orientation, or gender identity.               Review of your medicines      UNREVIEWED medicines. Ask your doctor about these medicines        Dose / Directions    amLODIPine 10 MG tablet   Commonly known as:  NORVASC   Used for:  HTN (hypertension), Liver replaced by transplant (H), S/P kidney transplant        TAKE ONE TABLET BY MOUTH EVERY DAY   Quantity:  90 tablet   Refills:  3       apixaban ANTICOAGULANT 5 MG tablet   Commonly known as:  ELIQUIS   Used for:  Atrial flutter, unspecified type (H)        Dose:  5 mg   Take 1 tablet (5 mg) by mouth 2 times daily   Quantity:  180 tablet   Refills:  3       *  ketoconazole 2 % cream   Commonly known as:  NIZORAL   Used for:  Dermatitis, seborrheic        Twice daily to areas of rash on face   Quantity:  60 g   Refills:  1       * ketoconazole 2 % shampoo   Commonly known as:  NIZORAL   Used for:  Dermatitis, seborrheic        Use as a foaming face wash in shower daily   Quantity:  120 mL   Refills:  3       levothyroxine 137 MCG tablet   Commonly known as:  SYNTHROID/LEVOTHROID   Used for:  Papillary thyroid carcinoma (H), Postoperative hypothyroidism        Take 1 tablet by mouth Monday - Saturday  and 1.5 tablets on Sunday   Quantity:  96 tablet   Refills:  3       lisinopril 5 MG tablet   Commonly known as:  PRINIVIL/ZESTRIL   Used for:  HTN (hypertension), Liver replaced by transplant (H), S/P kidney transplant        TAKE ONE TABLET BY MOUTH EVERY DAY   Quantity:  90 tablet   Refills:  3       magnesium oxide 400 MG tablet   Commonly known as:  MAG-OX   Used for:  Hypomagnesemia        TAKE ONE TABLET BY MOUTH TWICE A DAY   Quantity:  120 tablet   Refills:  11       metoprolol succinate 25 MG 24 hr tablet   Commonly known as:  TOPROL-XL   Used for:  Atrial flutter, unspecified type (H)        Dose:  25 mg   Take 1 tablet (25 mg) by mouth daily   Quantity:  90 tablet   Refills:  3       mycophenolate 250 MG capsule   Commonly known as:  GENERIC EQUIVALENT   Used for:  Kidney replaced by transplant        Dose:  250 mg   Take 1 capsule (250 mg) by mouth 2 times daily   Quantity:  60 capsule   Refills:  11       oxyCODONE-acetaminophen 5-325 MG per tablet   Commonly known as:  PERCOCET   Used for:  Postoperative pain        Dose:  1 tablet   Take 1 tablet by mouth every 4 hours as needed for pain (moderate to severe)   Quantity:  10 tablet   Refills:  0       predniSONE 5 MG tablet   Commonly known as:  DELTASONE   Used for:  Liver replaced by transplant (H), S/P kidney transplant        Take 2.5 mg once daily for 14 days, then stop   Quantity:  90 tablet   Refills:  3        sulfamethoxazole-trimethoprim 400-80 MG per tablet   Commonly known as:  BACTRIM/SEPTRA   Indication:  PCP Prophylaxis   Used for:  Liver replaced by transplant (H), S/P kidney transplant        Dose:  1 tablet   Take 1 tablet by mouth daily   Quantity:  30 tablet   Refills:  11       * tacrolimus 0.5 MG capsule   Commonly known as:  GENERIC EQUIVALENT   Used for:  S/P kidney transplant        TAKE ONE CAPSULE BY MOUTH TWICE A DAY. (TOTAL DAILY DOSE IS 1.5MG TWO TIMES A DAY).   Quantity:  60 capsule   Refills:  5       * tacrolimus 1 MG capsule   Commonly known as:  GENERIC EQUIVALENT   Used for:  Liver replaced by transplant (H), S/P kidney transplant, HTN (hypertension)        Dose:  1 mg   Take 1 capsule (1 mg) by mouth 2 times daily   Quantity:  60 capsule   Refills:  11       * Notice:  This list has 4 medication(s) that are the same as other medications prescribed for you. Read the directions carefully, and ask your doctor or other care provider to review them with you.             Protect others around you: Learn how to safely use, store and throw away your medicines at www.disposemymeds.org.             Medication List: This is a list of all your medications and when to take them. Check marks below indicate your daily home schedule. Keep this list as a reference.      Medications           Morning Afternoon Evening Bedtime As Needed    amLODIPine 10 MG tablet   Commonly known as:  NORVASC   TAKE ONE TABLET BY MOUTH EVERY DAY                                apixaban ANTICOAGULANT 5 MG tablet   Commonly known as:  ELIQUIS   Take 1 tablet (5 mg) by mouth 2 times daily                                * ketoconazole 2 % cream   Commonly known as:  NIZORAL   Twice daily to areas of rash on face                                * ketoconazole 2 % shampoo   Commonly known as:  NIZORAL   Use as a foaming face wash in shower daily                                levothyroxine 137 MCG tablet   Commonly known as:   SYNTHROID/LEVOTHROID   Take 1 tablet by mouth Monday - Saturday  and 1.5 tablets on Sunday                                lisinopril 5 MG tablet   Commonly known as:  PRINIVIL/ZESTRIL   TAKE ONE TABLET BY MOUTH EVERY DAY                                magnesium oxide 400 MG tablet   Commonly known as:  MAG-OX   TAKE ONE TABLET BY MOUTH TWICE A DAY                                metoprolol succinate 25 MG 24 hr tablet   Commonly known as:  TOPROL-XL   Take 1 tablet (25 mg) by mouth daily                                mycophenolate 250 MG capsule   Commonly known as:  GENERIC EQUIVALENT   Take 1 capsule (250 mg) by mouth 2 times daily                                oxyCODONE-acetaminophen 5-325 MG per tablet   Commonly known as:  PERCOCET   Take 1 tablet by mouth every 4 hours as needed for pain (moderate to severe)                                predniSONE 5 MG tablet   Commonly known as:  DELTASONE   Take 2.5 mg once daily for 14 days, then stop                                sulfamethoxazole-trimethoprim 400-80 MG per tablet   Commonly known as:  BACTRIM/SEPTRA   Take 1 tablet by mouth daily                                * tacrolimus 0.5 MG capsule   Commonly known as:  GENERIC EQUIVALENT   TAKE ONE CAPSULE BY MOUTH TWICE A DAY. (TOTAL DAILY DOSE IS 1.5MG TWO TIMES A DAY).                                * tacrolimus 1 MG capsule   Commonly known as:  GENERIC EQUIVALENT   Take 1 capsule (1 mg) by mouth 2 times daily                                * Notice:  This list has 4 medication(s) that are the same as other medications prescribed for you. Read the directions carefully, and ask your doctor or other care provider to review them with you.

## 2018-09-26 NOTE — PROCEDURES
Kidney Transplant Biopsy Procedure Note    Indication/Diagnosis:  Kidney transplant with elevated creatinine    Procedure:  The indications and risks of the kidney biopsy, including bleeding severe enough to require hospitalization, transfusion, surgery, or even loss of the kidney or death, were explained, understood and agreed.  Consent was signed.  The kidney, located in the right lower quadrant, was visualized under ultrasound and biopsy site marked.  Patient was prepped and draped in the usual sterile manner.  Biopsy site was anesthetized with 1% lidocaine.  Biopsy consisted of 3 passes with a 18 ga needle under direct ultrasound guidance were made and tissue was sent to pathology.  There were no immediate complications.  Pressure was held over biopsy site and patient will be observed for signs of bleeding or other complications.  Patient remained hemodynamically stable during and early post procedure.    Procedure Attestation:  This patient has been seen by me, Yuval Khan MD.  I was present for the key portions of the procedure done by the fellow.

## 2018-09-26 NOTE — IP AVS SNAPSHOT
[facility]    [address] [place] [address]    Phone:  [phone]    Fax:  [phone]                                       After Visit Summary   9/26/2018    Cricket Chen    MRN: [id number]           After Visit Summary Signature Page     I have received my discharge instructions, and my questions have been answered. I have discussed any challenges I see with this plan with the nurse or doctor.    ..........................................................................................................................................  Patient/Patient Representative Signature      ..........................................................................................................................................  Patient Representative Print Name and Relationship to Patient    ..................................................               ................................................  Date                                   Time    ..........................................................................................................................................  Reviewed by Signature/Title    ...................................................              ..............................................  Date                                               Time          22EPIC Rev 08/18

## 2018-09-26 NOTE — PROGRESS NOTES
Tac level 6.6 was not a full 12 hour trough.  Pt. has had steady levels and no recent dose changes.  Will recheck tac level with next lab draw.

## 2018-09-27 LAB
COPATH REPORT: NORMAL
DONOR IDENTIFICATION: NORMAL
DSA COMMENTS: NORMAL
DSA PRESENT: NO
DSA TEST METHOD: NORMAL
ORGAN: NORMAL
SA1 CELL: NORMAL
SA1 COMMENTS: NORMAL
SA1 HI RISK ABY: NORMAL
SA1 MOD RISK ABY: NORMAL
SA1 TEST METHOD: NORMAL
SA2 CELL: NORMAL
SA2 COMMENTS: NORMAL
SA2 HI RISK ABY UA: NORMAL
SA2 MOD RISK ABY: NORMAL
SA2 TEST METHOD: NORMAL
UNOS CPRA: 0

## 2018-09-28 ENCOUNTER — TELEPHONE (OUTPATIENT)
Dept: NEPHROLOGY | Facility: CLINIC | Age: 38
End: 2018-09-28

## 2018-09-28 LAB
BKV DNA # SPEC NAA+PROBE: 5545 COPIES/ML
BKV DNA SPEC NAA+PROBE-LOG#: 3.7 LOG COPIES/ML
SPECIMEN SOURCE: ABNORMAL

## 2018-09-28 NOTE — TELEPHONE ENCOUNTER
I called the patient regarding his kidney transplant biopsy results.  There was no evidence of rejection.  There is no evidence of BK nephropathy inside of the kidney.  As such I will make no changes to his immunosuppression.  Goal Prograf for him is 3-5 ng/mL.  We will continue to monitor his BK viremia as well as his creatinine.

## 2018-10-02 DIAGNOSIS — Z94.0 KIDNEY TRANSPLANTED: ICD-10-CM

## 2018-10-02 LAB
ANION GAP SERPL CALCULATED.3IONS-SCNC: 8 MMOL/L (ref 3–14)
BUN SERPL-MCNC: 17 MG/DL (ref 7–30)
CALCIUM SERPL-MCNC: 9.1 MG/DL (ref 8.5–10.1)
CHLORIDE SERPL-SCNC: 107 MMOL/L (ref 94–109)
CO2 SERPL-SCNC: 25 MMOL/L (ref 20–32)
CREAT SERPL-MCNC: 1.3 MG/DL (ref 0.66–1.25)
ERYTHROCYTE [DISTWIDTH] IN BLOOD BY AUTOMATED COUNT: 13.2 % (ref 10–15)
GFR SERPL CREATININE-BSD FRML MDRD: 62 ML/MIN/1.7M2
GLUCOSE SERPL-MCNC: 91 MG/DL (ref 70–99)
HCT VFR BLD AUTO: 42.5 % (ref 40–53)
HGB BLD-MCNC: 14.8 G/DL (ref 13.3–17.7)
MCH RBC QN AUTO: 30.8 PG (ref 26.5–33)
MCHC RBC AUTO-ENTMCNC: 34.8 G/DL (ref 31.5–36.5)
MCV RBC AUTO: 88 FL (ref 78–100)
PLATELET # BLD AUTO: 137 10E9/L (ref 150–450)
POTASSIUM SERPL-SCNC: 4.3 MMOL/L (ref 3.4–5.3)
RBC # BLD AUTO: 4.81 10E12/L (ref 4.4–5.9)
SODIUM SERPL-SCNC: 140 MMOL/L (ref 133–144)
WBC # BLD AUTO: 4.4 10E9/L (ref 4–11)

## 2018-10-02 PROCEDURE — 85027 COMPLETE CBC AUTOMATED: CPT | Performed by: INTERNAL MEDICINE

## 2018-10-02 PROCEDURE — 36415 COLL VENOUS BLD VENIPUNCTURE: CPT | Performed by: INTERNAL MEDICINE

## 2018-10-02 PROCEDURE — 80197 ASSAY OF TACROLIMUS: CPT | Performed by: INTERNAL MEDICINE

## 2018-10-02 PROCEDURE — 80048 BASIC METABOLIC PNL TOTAL CA: CPT | Performed by: INTERNAL MEDICINE

## 2018-10-03 LAB
TACROLIMUS BLD-MCNC: 4.2 UG/L (ref 5–15)
TME LAST DOSE: ABNORMAL H

## 2018-10-16 DIAGNOSIS — Z94.0 KIDNEY TRANSPLANTED: ICD-10-CM

## 2018-10-16 DIAGNOSIS — C73 PAPILLARY THYROID CARCINOMA (H): ICD-10-CM

## 2018-10-16 DIAGNOSIS — Z94.4 LIVER REPLACED BY TRANSPLANT (H): ICD-10-CM

## 2018-10-16 LAB
ALBUMIN SERPL-MCNC: 3.9 G/DL (ref 3.4–5)
ALP SERPL-CCNC: 91 U/L (ref 40–150)
ALT SERPL W P-5'-P-CCNC: 54 U/L (ref 0–70)
ANION GAP SERPL CALCULATED.3IONS-SCNC: 7 MMOL/L (ref 3–14)
AST SERPL W P-5'-P-CCNC: 39 U/L (ref 0–45)
BILIRUB DIRECT SERPL-MCNC: 0.1 MG/DL (ref 0–0.2)
BILIRUB SERPL-MCNC: 0.5 MG/DL (ref 0.2–1.3)
BUN SERPL-MCNC: 17 MG/DL (ref 7–30)
CALCIUM SERPL-MCNC: 9.3 MG/DL (ref 8.5–10.1)
CHLORIDE SERPL-SCNC: 108 MMOL/L (ref 94–109)
CO2 SERPL-SCNC: 25 MMOL/L (ref 20–32)
CREAT SERPL-MCNC: 1.42 MG/DL (ref 0.66–1.25)
ERYTHROCYTE [DISTWIDTH] IN BLOOD BY AUTOMATED COUNT: 13.1 % (ref 10–15)
GFR SERPL CREATININE-BSD FRML MDRD: 56 ML/MIN/1.7M2
GLUCOSE SERPL-MCNC: 101 MG/DL (ref 70–99)
HCT VFR BLD AUTO: 45.2 % (ref 40–53)
HGB BLD-MCNC: 15.6 G/DL (ref 13.3–17.7)
MCH RBC QN AUTO: 30.6 PG (ref 26.5–33)
MCHC RBC AUTO-ENTMCNC: 34.5 G/DL (ref 31.5–36.5)
MCV RBC AUTO: 89 FL (ref 78–100)
PLATELET # BLD AUTO: 131 10E9/L (ref 150–450)
POTASSIUM SERPL-SCNC: 5.1 MMOL/L (ref 3.4–5.3)
PROT SERPL-MCNC: 7.2 G/DL (ref 6.8–8.8)
RBC # BLD AUTO: 5.1 10E12/L (ref 4.4–5.9)
SODIUM SERPL-SCNC: 140 MMOL/L (ref 133–144)
T4 FREE SERPL-MCNC: 1.16 NG/DL (ref 0.76–1.46)
TACROLIMUS BLD-MCNC: 5.1 UG/L (ref 5–15)
TME LAST DOSE: NORMAL H
TSH SERPL DL<=0.005 MIU/L-ACNC: 1.16 MU/L (ref 0.4–4)
WBC # BLD AUTO: 4.6 10E9/L (ref 4–11)

## 2018-10-16 PROCEDURE — 87799 DETECT AGENT NOS DNA QUANT: CPT | Performed by: INTERNAL MEDICINE

## 2018-10-16 PROCEDURE — 84443 ASSAY THYROID STIM HORMONE: CPT | Performed by: INTERNAL MEDICINE

## 2018-10-16 PROCEDURE — 36415 COLL VENOUS BLD VENIPUNCTURE: CPT | Performed by: INTERNAL MEDICINE

## 2018-10-16 PROCEDURE — 80048 BASIC METABOLIC PNL TOTAL CA: CPT | Performed by: INTERNAL MEDICINE

## 2018-10-16 PROCEDURE — 84439 ASSAY OF FREE THYROXINE: CPT | Performed by: INTERNAL MEDICINE

## 2018-10-16 PROCEDURE — 85027 COMPLETE CBC AUTOMATED: CPT | Performed by: INTERNAL MEDICINE

## 2018-10-16 PROCEDURE — 80076 HEPATIC FUNCTION PANEL: CPT | Performed by: INTERNAL MEDICINE

## 2018-10-16 PROCEDURE — 80197 ASSAY OF TACROLIMUS: CPT | Performed by: INTERNAL MEDICINE

## 2018-10-17 LAB
BKV DNA # SPEC NAA+PROBE: 3136 COPIES/ML
BKV DNA SPEC NAA+PROBE-LOG#: 3.5 LOG COPIES/ML
SPECIMEN SOURCE: ABNORMAL

## 2018-10-18 ENCOUNTER — OFFICE VISIT (OUTPATIENT)
Dept: DERMATOLOGY | Facility: CLINIC | Age: 38
End: 2018-10-18
Payer: COMMERCIAL

## 2018-10-18 DIAGNOSIS — L21.9 DERMATITIS, SEBORRHEIC: Primary | ICD-10-CM

## 2018-10-18 RX ORDER — KETOCONAZOLE 20 MG/ML
SHAMPOO TOPICAL
Qty: 120 ML | Refills: 3 | Status: SHIPPED | OUTPATIENT
Start: 2018-10-18 | End: 2020-08-21

## 2018-10-18 RX ORDER — DESONIDE 0.5 MG/G
OINTMENT TOPICAL 2 TIMES DAILY
Qty: 15 G | Refills: 3 | Status: SHIPPED | OUTPATIENT
Start: 2018-10-18 | End: 2020-08-21

## 2018-10-18 ASSESSMENT — PAIN SCALES - GENERAL: PAINLEVEL: NO PAIN (0)

## 2018-10-18 NOTE — LETTER
"10/18/2018       RE: Cricket Chen  [address]  [facility] [address]     Dear Colleague,    Thank you for referring your patient, Cricket Chen, to the [facility] DERMATOLOGY at [facility]. Please see a copy of my visit note below.    [facility] Dermatology Note    Dermatology Problem List:  1. Seborrheic dermatitis  Rx: Ketoconazole shampoo    Encounter Date: Oct 18, 2018    CC:   Chief Complaint   Patient presents with     Derm Problem     Dermatitis follow up , Cricket states \" The redness under my eye is better.\"        History of Present Illness:  Mr. Cricket Chen is a 38 year old male who presents as a follow-up for seborrheic dermatitis of face. The patient was last seen 8/2/18.  The patient started to see improvement in the erythema inferior to his right eye with a ketoconazole shampoo.  He has noted no other locations of worsening erythema or scale.  He has not noted any scaling, pruritus, erythema of the scalp.    Past Medical History:   Patient Active Problem List   Diagnosis     Atrial flutter (H)     Complete transposition of great vessels     Esophageal varices (H)     Insomnia     Alcoholic hepatitis     History of alcohol abuse     History of transposition of great vessels     Depression     Thrombocytopenia (H)     Pain medication agreement     [facility]     Liver replaced by transplant (H)     Kidney replaced by transplant     Immunosuppressed status (H)     Hypomagnesemia     Secondary renal hyperparathyroidism (H)     Chronic pain syndrome     Pain management contract signed     Aftercare following organ transplant     Post-transplant lymphoproliferative disorder (H)     Papillary thyroid carcinoma (H)     Advance directive discussed with patient     Acute alcoholic liver disease     Alcohol dependence (H)     Encounter for palliative care     Hypertension " secondary to other renal disorders     Atrial fibrillation (H)     Postoperative hypothyroidism     Arm mass, right     Dermatitis, seborrheic     Past Medical History:   Diagnosis Date     Alcohol abuse     Last drink in Mid-April 2014     Anemia in ESRD (end-stage renal disease) (H)      Anxiety 2008     Atrial flutter (H)      Cirrhosis (H)     S/P liver transplant     Depression      History of blood transfusion      History of transposition of great vessels     atrial switch at age 8 months old     Hypertension      Liver transplant recipient (H)     2016     Papillary thyroid carcinoma (H)      Pneumonia 11-15-14     Renal transplant recipient     2016     Varices, esophageal (H)      Past Surgical History:   Procedure Laterality Date     ANESTHESIA CARDIOVERSION N/A 3/7/2018    Procedure: ANESTHESIA CARDIOVERSION;  Cardioversion;  Surgeon: GENERIC ANESTHESIA PROVIDER;  Location: [facility] OR     BENCH LIVER N/A 5/10/2016    Procedure: BENCH LIVER;  Surgeon: Ricky Deshpande MD;  [facility]: [facility] OR     BIOPSY LYMPH NODE CERVICAL Right 1/26/2017    Procedure: BIOPSY LYMPH NODE CERVICAL;  Surgeon: Beka Soto MD;  Location:  OR     CARDIAC SURGERY  [birth year]     CREATE FISTULA ARTERIOVENOUS UPPER EXTREMITY Right 9/16/2014    Procedure: CREATE FISTULA ARTERIOVENOUS UPPER EXTREMITY;  Surgeon: Padmaja Eaton MD;  Location: [facility] OR     CYSTOSCOPY, REMOVE STENT(S), COMBINED Right 6/22/2016    Procedure: COMBINED CYSTOSCOPY, REMOVE STENT(S);  Surgeon: Ricky Deshpande MD;  Location: [facility] OR     EMBOLECTOMY UPPER EXTREMITY Right 8/17/2018    Procedure: EMBOLECTOMY UPPER EXTREMITY;  Repair Right Upper Arm Pseudo Aneursym ;  Surgeon: John Payton MD;  Location:  OR     ENT SURGERY       ESOPHAGOSCOPY, GASTROSCOPY, DUODENOSCOPY (EGD), COMBINED  5/30/2014    Procedure: COMBINED ESOPHAGOSCOPY, GASTROSCOPY, DUODENOSCOPY (EGD);  Surgeon: Guillaume Bautista MD;  Location:  GI     ESOPHAGOSCOPY, GASTROSCOPY,  DUODENOSCOPY (EGD), COMBINED  14     ESOPHAGOSCOPY, GASTROSCOPY, DUODENOSCOPY (EGD), COMBINED Left 3/12/2015    Procedure: COMBINED ESOPHAGOSCOPY, GASTROSCOPY, DUODENOSCOPY (EGD);  Surgeon: Laureen Galvan MD;  [facility] GI     ESOPHAGOSCOPY, GASTROSCOPY, DUODENOSCOPY (EGD), COMBINED N/A 2016    Procedure: COMBINED ESOPHAGOSCOPY, GASTROSCOPY, DUODENOSCOPY (EGD);  Surgeon: Laureen Galvan MD;  [facility] GI     ESOPHAGOSCOPY, GASTROSCOPY, DUODENOSCOPY (EGD), COMBINED N/A 2016    Procedure: COMBINED ESOPHAGOSCOPY, GASTROSCOPY, DUODENOSCOPY (EGD);  Surgeon: Laureen Galvan MD;  [facility] GI     HC OR CATH ABLATION NON-CARDIAC ENDOVASCULAR      SVT     INSERT PORT VASCULAR ACCESS N/A 4/3/2017    Procedure: INSERT PORT VASCULAR ACCESS;  Surgeon: Rajendra Jacques PA-C;  [facility] OR     LIGATE FISTULA ARTERIOVENOUS UPPER EXTREMITY Right 2017    Procedure: LIGATE FISTULA ARTERIOVENOUS UPPER EXTREMITY;  Revision of Right Arm Arteriovenous Fistula ;  Surgeon: Padmaja Eaton MD;  [facility]     PERCUTANEOUS BIOPSY KIDNEY Right 2018    Procedure: PERCUTANEOUS BIOPSY KIDNEY;  Right Kidney Biopsy;  Surgeon: Yuval Khan MD;  [facility]     PICC INSERTION  14    PICC line placement 14; Removal 2014     THORACIC SURGERY  1980    Transposition great arteries, repaired at 8 months     THYROIDECTOMY Right 2017    Procedure: THYROIDECTOMY;  Bilateral Total Thyroidectomy ;  Surgeon: Beka Soto MD;  [facility]     TRANSPLANT KIDNEY RECIPIENT  DONOR  5/10/2016    Procedure: TRANSPLANT KIDNEY RECIPIENT  DONOR;  Surgeon: Ricky Deshpande MD;  [facility]     TRANSPLANT LIVER RECIPIENT  DONOR N/A 5/10/2016    Procedure: TRANSPLANT LIVER RECIPIENT  DONOR;  Surgeon: Ricky Deshpande MD;  [facility]     Medications:  Current Outpatient Prescriptions   Medication Sig  Dispense Refill     amLODIPine (NORVASC) 10 MG tablet TAKE ONE TABLET BY MOUTH EVERY DAY 90 tablet 3     apixaban ANTICOAGULANT (ELIQUIS) 5 MG tablet Take 1 tablet (5 mg) by mouth 2 times daily 180 tablet 3     ketoconazole (NIZORAL) 2 % cream Twice daily to areas of rash on face 60 g 1     ketoconazole (NIZORAL) 2 % shampoo Use as a foaming face wash in shower daily 120 mL 3     levothyroxine (SYNTHROID/LEVOTHROID) 137 MCG tablet Take 1 tablet by mouth Monday - Saturday  and 1.5 tablets on Sunday 96 tablet 3     lisinopril (PRINIVIL/ZESTRIL) 5 MG tablet TAKE ONE TABLET BY MOUTH EVERY DAY 90 tablet 3     magnesium oxide (MAG-OX) 400 MG tablet TAKE ONE TABLET BY MOUTH TWICE A  tablet 11     metoprolol succinate (TOPROL-XL) 25 MG 24 hr tablet Take 1 tablet (25 mg) by mouth daily 90 tablet 3     mycophenolate (GENERIC EQUIVALENT) 250 MG capsule Take 1 capsule (250 mg) by mouth 2 times daily 60 capsule 11     oxyCODONE-acetaminophen (PERCOCET) 5-325 MG per tablet Take 1 tablet by mouth every 4 hours as needed for pain (moderate to severe) 10 tablet 0     predniSONE (DELTASONE) 5 MG tablet Take 2.5 mg once daily for 14 days, then stop 90 tablet 3     sulfamethoxazole-trimethoprim (BACTRIM/SEPTRA) 400-80 MG per tablet Take 1 tablet by mouth daily 30 tablet 11     tacrolimus (GENERIC EQUIVALENT) 0.5 MG capsule TAKE ONE CAPSULE BY MOUTH TWICE A DAY. (TOTAL DAILY DOSE IS 1.5MG TWO TIMES A DAY). 60 capsule 5     tacrolimus (GENERIC EQUIVALENT) 1 MG capsule Take 1 capsule (1 mg) by mouth 2 times daily 60 capsule 11        Allergies:  Allergies   Allergen Reactions     Hydromorphone Itching         Review of Systems:  Constitutional: No recent fevers, chills, night sweats.  Skin: As above in HPI. Otherwise no additional skin rashes or changes.  No oral sores    Physical exam:  Vitals: There were no vitals taken for this visit.  GEN: This is a well developed, well-nourished male in no acute distress, in a pleasant mood.     SKIN: Focused examination of the face and scalp was performed.  -There is minimal macular erythema of the beard with mild flaky white scale.  -Mild erythema present on the right cheek  -No evidence of any erythema or scale present on scalp.  -No other lesions of concern on areas examined.     Impression/Plan:  1. Seborrheic dermatitis    The patient has noted improvement of the erythema of his face with ketoconazole 2% shampoo.    Continue ketoconazole shampoo to face and beard    Initiate desonide ointment twice daily as needed for area unable to be treated with ketoconazole      Return to clinic PRN    Dr. Dobbins staffed the patient.    Dami Meidna MD  Internal Medicine PGY-3    I have seen and examined this patient and agree with the assessment and plan as documented in the resident's note.    Yuval Dobbins MD  Dermatology Attending

## 2018-10-18 NOTE — MR AVS SNAPSHOT
After Visit Summary   10/18/2018    Cricket Chen    MRN: [id number]           Patient Information     Date Of Birth          1980        Visit Information        Provider Department      10/18/2018 12:45 PM Yuval Dobbins MD [facility] Dermatology        Today's Diagnoses     Dermatitis, seborrheic    -  1       Follow-ups after your visit        Your next 10 appointments already scheduled     Oct 30, 2018 11:15 AM CDT   LAB with [facility]   [facility] ([facility])    [address] [place] [address]   [phone]           Please do not eat 10-12 hours before your appointment if you are coming in fasting for labs on lipids, cholesterol, or glucose (sugar). This does not apply to pregnant women. Water, hot tea and black coffee (with nothing added) are okay. Do not drink other fluids, diet soda or chew gum.            Jan 08, 2019 12:00 PM CST   CT CHEST ABDOMEN PELVIS W/O & W CONTRAST with UCCT2   [facility] [place] ([facility])    [address] [address] [phone]           How do I prepare for my exam? (Food and drink instructions) To prepare: Do not eat or drink for 2 hours before your exam. If you need to take medicine, you may take it with small sips of water. (We may ask you to take liquid medicine as well.)  How do I prepare for my exam? (Other instructions) Please arrive 30 minutes early for your CT.  Once in the department you might be asked to drink water 15-20 minutes prior to your exam.  If indicated you may be asked to drink an oral contrast in advance of your CT.  If this is the case, the imaging team will let you know or be in contact with you prior to your appointment  Patients over 70 or patients with diabetes or kidney problems: If you haven t had a blood test (creatinine test) within the last 30 days, the Cardiologist/Radiologist may require you to get this test  prior to your exam.  If you have diabetes:  Continue to take your metformin medication on the day of your exam  What should I wear: Please wear loose clothing, such as a sweat suit or jogging clothes. Avoid snaps, zippers and other metal. We may ask you to undress and put on a hospital gown.  How long does the exam take: Most scans take less than 20 minutes.  What should I bring: Please bring any scans or X-rays taken at other hospitals, if similar tests were done. Also bring a list of your medicines, including vitamins, minerals and over-the-counter drugs. It is safest to leave personal items at home.  Do I need a : No  is needed.  What do I need to tell my doctor? Be sure to tell your doctor: * If you have any allergies. * If there s any chance you are pregnant. * If you are breastfeeding.  What should I do after the exam: No restrictions, You may resume normal activities.  What is this test: A CT (computed tomography) scan is a series of pictures that allows us to look inside your body. The scanner creates images of the body in cross sections, much like slices of bread. This helps us see any problems more clearly. You may receive contrast (X-ray dye) before or during your scan. You will be asked to drink the contrast.  Who should I call with questions: If you have any questions, please call the Imaging Department where you will have your exam. Directions, parking instructions, and other information is available on our website, [email or web address].            George 15, 2019 11:15 AM CST   [facility] Draw with  [facility] DRAW   [facility] Lab Draw ([facility])    [address] [place] [address]  [address] [place] [address] [phone]            George 15, 2019 11:45 AM CST   (Arrive by 11:30 AM)   Return Visit with Carter Prather MD   [facility] ([facility])    [address] [place] [address]  [address] [place] [id number]    [phone]            Jan 18, 2019 11:00 AM CST   (Arrive by 10:45 AM)   Return Liver Transplant with Laureen Galvan MD   [facility] Hepatology ([facility])    [address]  [address]  [facility] [id number] [phone]            Feb 12, 2019  2:35 PM CST   (Arrive by 2:05 PM)   Return Kidney Transplant with  Kidney/Pancreas Recipient 1   [facility] Nephrology ([facility])    [address]  [address]  [facility] [id number] [phone]              Who to contact     Please call your clinic at [phone] to:    Ask questions about your health    Make or cancel appointments    Discuss your medicines    Learn about your test results    Speak to your doctor            Additional Information About Your Visit        [facility] Information     [facility] gives you secure access to your electronic health record. If you see a primary care provider, you can also send messages to your care team and make appointments. If you have questions, please call your primary care clinic.  If you do not have a primary care provider, please call [phone] and they will assist you.      [facility] is an electronic gateway that provides easy, online access to your medical records. With [facility], you can request a clinic appointment, read your test results, renew a prescription or communicate with your care team.     To access your existing account, please contact your [facility] or call [phone] for assistance.        Care EveryWhere ID     This is your Care EveryWhere ID. This could be used by other organizations to access your [place] medical records  [id number]         Blood Pressure from Last 3 Encounters:   09/26/18 108/60   09/20/18 143/84   08/17/18 135/81    Weight from Last 3 Encounters:   08/17/18 86.5 kg (190 lb 11.2 oz)   08/13/18 87.5 kg (192 lb 14.4 oz)   08/10/18 86.8 kg (191 lb 6.4 oz)               Today, you had the following     No orders found for display         Today's Medication Changes          These changes are accurate as of 10/18/18 11:59 PM.  If you have any questions, ask your nurse or doctor.               Start taking these medicines.        Dose/Directions    desonide 0.05 % ointment   Commonly known as:  DESOWEN   Used for:  Dermatitis, seborrheic        Apply topically 2 times daily   Quantity:  15 g   Refills:  3            Where to get your medicines      These medications were sent to [place], [place] - [address]  [address], [facility] [address]    Hours:  TRANSPLANT PHONE NUMBER [phone] Phone:  [phone]     desonide 0.05 % ointment    ketoconazole 2 % shampoo                Primary Care Provider    [facility]       No address on file        Equal Access to Services     CLAY VIDES : Ana M interiano Sosalty, waaxda luqadaha, qaybta kaalmada adebryan, robina sibley. So [facility] [phone].    ATENCIÓN: Si habla español, tiene a laura disposición servicios gratuitos de asistencia lingüística. Jaelyn al [phone].    We comply with applicable federal civil rights laws and [place] laws. We do not discriminate on the basis of race, color, national origin, age, disability, sex, sexual orientation, or gender identity.            Thank you!     Thank you for choosing [facility] DERMATOLOGY  for your care. Our goal is always to provide you with excellent care. Hearing back from our patients is one way we can continue to improve our services. Please take a few minutes to complete the written survey that you may receive in the mail after your visit with us. Thank you!             Your Updated Medication List - Protect others around you: Learn how to safely use, store and throw away your medicines at www.disposemymeds.org.          This list is accurate as of  10/18/18 11:59 PM.  Always use your most recent med list.                   Brand Name Dispense Instructions for use Diagnosis    amLODIPine 10 MG tablet    NORVASC    90 tablet    TAKE ONE TABLET BY MOUTH EVERY DAY    HTN (hypertension), Liver replaced by transplant (H), S/P kidney transplant       apixaban ANTICOAGULANT 5 MG tablet    ELIQUIS    180 tablet    Take 1 tablet (5 mg) by mouth 2 times daily    Atrial flutter, unspecified type (H)       desonide 0.05 % ointment    DESOWEN    15 g    Apply topically 2 times daily    Dermatitis, seborrheic       * ketoconazole 2 % cream    NIZORAL    60 g    Twice daily to areas of rash on face    Dermatitis, seborrheic       * ketoconazole 2 % shampoo    NIZORAL    120 mL    Use as a foaming face wash in shower daily    Dermatitis, seborrheic       levothyroxine 137 MCG tablet    SYNTHROID/LEVOTHROID    96 tablet    Take 1 tablet by mouth Monday - Saturday  and 1.5 tablets on Sunday    Papillary thyroid carcinoma (H), Postoperative hypothyroidism       lisinopril 5 MG tablet    PRINIVIL/ZESTRIL    90 tablet    TAKE ONE TABLET BY MOUTH EVERY DAY    HTN (hypertension), Liver replaced by transplant (H), S/P kidney transplant       magnesium oxide 400 MG tablet    MAG-OX    120 tablet    TAKE ONE TABLET BY MOUTH TWICE A DAY    Hypomagnesemia       metoprolol succinate 25 MG 24 hr tablet    TOPROL-XL    90 tablet    Take 1 tablet (25 mg) by mouth daily    Atrial flutter, unspecified type (H)       mycophenolate 250 MG capsule    GENERIC EQUIVALENT    60 capsule    Take 1 capsule (250 mg) by mouth 2 times daily    Kidney replaced by transplant       oxyCODONE-acetaminophen 5-325 MG per tablet    PERCOCET    10 tablet    Take 1 tablet by mouth every 4 hours as needed for pain (moderate to severe)    Postoperative pain       predniSONE 5 MG tablet    DELTASONE    90 tablet    Take 2.5 mg once daily for 14 days, then stop    Liver replaced by transplant (H), S/P kidney transplant        sulfamethoxazole-trimethoprim 400-80 MG per tablet    BACTRIM/SEPTRA    30 tablet    Take 1 tablet by mouth daily    Liver replaced by transplant (H), S/P kidney transplant       * tacrolimus 0.5 MG capsule    GENERIC EQUIVALENT    60 capsule    TAKE ONE CAPSULE BY MOUTH TWICE A DAY. (TOTAL DAILY DOSE IS 1.5MG TWO TIMES A DAY).    S/P kidney transplant       * tacrolimus 1 MG capsule    GENERIC EQUIVALENT    60 capsule    Take 1 capsule (1 mg) by mouth 2 times daily    Liver replaced by transplant (H), S/P kidney transplant, HTN (hypertension)       * Notice:  This list has 4 medication(s) that are the same as other medications prescribed for you. Read the directions carefully, and ask your doctor or other care provider to review them with you.

## 2018-10-18 NOTE — PROGRESS NOTES
"Former[facility] Dermatology Note    Dermatology Problem List:  1. Seborrheic dermatitis  Rx: Ketoconazole shampoo    Encounter Date: Oct 18, 2018    CC:   Chief Complaint   Patient presents with     Derm Problem     Dermatitis follow up , Cricket states \" The redness under my eye is better.\"        History of Present Illness:  Mr. Cricket Chen is a 38 year old male who presents as a follow-up for seborrheic dermatitis of face. The patient was last seen 8/2/18.  The patient started to see improvement in the erythema inferior to his right eye with a ketoconazole shampoo.  He has noted no other locations of worsening erythema or scale.  He has not noted any scaling, pruritus, erythema of the scalp.    Past Medical History:   Patient Active Problem List   Diagnosis     Atrial flutter (H)     Complete transposition of great vessels     Esophageal varices (H)     Insomnia     Alcoholic hepatitis     History of alcohol abuse     History of transposition of great vessels     Depression     Thrombocytopenia (H)     Pain medication agreement     [facility]     Liver replaced by transplant (H)     Kidney replaced by transplant     Immunosuppressed status (H)     Hypomagnesemia     Secondary renal hyperparathyroidism (H)     Chronic pain syndrome     Pain management contract signed     Aftercare following organ transplant     Post-transplant lymphoproliferative disorder (H)     Papillary thyroid carcinoma (H)     Advance directive discussed with patient     Acute alcoholic liver disease     Alcohol dependence (H)     Encounter for palliative care     Hypertension secondary to other renal disorders     Atrial fibrillation (H)     Postoperative hypothyroidism     Arm mass, right     Dermatitis, seborrheic     Past Medical History:   Diagnosis Date     Alcohol abuse     Last drink in Mid-April 2014     Anemia in ESRD (end-stage renal disease) (H)  "     Anxiety 2008     Atrial flutter (H)      Cirrhosis (H)     S/P liver transplant     Depression      History of blood transfusion      History of transposition of great vessels     atrial switch at age 8 months old     Hypertension      Liver transplant recipient (H)     2016     Papillary thyroid carcinoma (H)      Pneumonia 11-15-14     Renal transplant recipient     2016     Varices, esophageal (H)      Past Surgical History:   Procedure Laterality Date     ANESTHESIA CARDIOVERSION N/A 3/7/2018    Procedure: ANESTHESIA CARDIOVERSION;  Cardioversion;  Surgeon: GENERIC ANESTHESIA PROVIDER;  Location:  OR     BENCH LIVER N/A 5/10/2016    Procedure: BENCH LIVER;  Surgeon: Ricky Deshpande MD;  [facility]     BIOPSY LYMPH NODE CERVICAL Right 1/26/2017    Procedure: BIOPSY LYMPH NODE CERVICAL;  Surgeon: Beka Soto MD;  Location:  OR     CARDIAC SURGERY  [birth year]     CREATE FISTULA ARTERIOVENOUS UPPER EXTREMITY Right 9/16/2014    Procedure: CREATE FISTULA ARTERIOVENOUS UPPER EXTREMITY;  Surgeon: Padmaja Eaton MD;  Location:  OR     CYSTOSCOPY, REMOVE STENT(S), COMBINED Right 6/22/2016    Procedure: COMBINED CYSTOSCOPY, REMOVE STENT(S);  Surgeon: Ricky Deshpande MD;  [facility]     EMBOLECTOMY UPPER EXTREMITY Right 8/17/2018    Procedure: EMBOLECTOMY UPPER EXTREMITY;  Repair Right Upper Arm Pseudo Aneursym ;  Surgeon: John Payton MD;  Location:  OR     ENT SURGERY       ESOPHAGOSCOPY, GASTROSCOPY, DUODENOSCOPY (EGD), COMBINED  5/30/2014    Procedure: COMBINED ESOPHAGOSCOPY, GASTROSCOPY, DUODENOSCOPY (EGD);  Surgeon: Guillaume Bautista MD;  Location:  GI     ESOPHAGOSCOPY, GASTROSCOPY, DUODENOSCOPY (EGD), COMBINED  9/30/14     ESOPHAGOSCOPY, GASTROSCOPY, DUODENOSCOPY (EGD), COMBINED Left 3/12/2015    Procedure: COMBINED ESOPHAGOSCOPY, GASTROSCOPY, DUODENOSCOPY (EGD);  Surgeon: Laureen Galvan MD;  Location:  GI     ESOPHAGOSCOPY, GASTROSCOPY, DUODENOSCOPY  (EGD), COMBINED N/A 2016    Procedure: COMBINED ESOPHAGOSCOPY, GASTROSCOPY, DUODENOSCOPY (EGD);  Surgeon: Laureen Galvan MD;  [facility]     ESOPHAGOSCOPY, GASTROSCOPY, DUODENOSCOPY (EGD), COMBINED N/A 2016    Procedure: COMBINED ESOPHAGOSCOPY, GASTROSCOPY, DUODENOSCOPY (EGD);  Surgeon: Laureen Galvan MD;  [facility] GI     HC OR CATH ABLATION NON-CARDIAC ENDOVASCULAR      SVT     INSERT PORT VASCULAR ACCESS N/A 4/3/2017    Procedure: INSERT PORT VASCULAR ACCESS;  Surgeon: Rajendra Jacques PA-C;  [facility]     LIGATE FISTULA ARTERIOVENOUS UPPER EXTREMITY Right 2017    Procedure: LIGATE FISTULA ARTERIOVENOUS UPPER EXTREMITY;  Revision of Right Arm Arteriovenous Fistula ;  Surgeon: Padmaja Eaton MD;  [facility]     PERCUTANEOUS BIOPSY KIDNEY Right 2018    Procedure: PERCUTANEOUS BIOPSY KIDNEY;  Right Kidney Biopsy;  Surgeon: Yuval Khan MD;  [facility]     PICC INSERTION  14    PICC line placement 14; Removal 2014     THORACIC SURGERY  1980    Transposition great arteries, repaired at 8 months     THYROIDECTOMY Right 2017    Procedure: THYROIDECTOMY;  Bilateral Total Thyroidectomy ;  Surgeon: Beka Soto MD;  [facility]     TRANSPLANT KIDNEY RECIPIENT  DONOR  5/10/2016    Procedure: TRANSPLANT KIDNEY RECIPIENT  DONOR;  Surgeon: Ricky Deshpande MD;  [facility]     TRANSPLANT LIVER RECIPIENT  DONOR N/A 5/10/2016    Procedure: TRANSPLANT LIVER RECIPIENT  DONOR;  Surgeon: Ricky Deshpande MD;  [facility]     Medications:  Current Outpatient Prescriptions   Medication Sig Dispense Refill     amLODIPine (NORVASC) 10 MG tablet TAKE ONE TABLET BY MOUTH EVERY DAY 90 tablet 3     apixaban ANTICOAGULANT (ELIQUIS) 5 MG tablet Take 1 tablet (5 mg) by mouth 2 times daily 180 tablet 3     ketoconazole (NIZORAL) 2 % cream Twice daily to areas of rash on face 60 g 1      ketoconazole (NIZORAL) 2 % shampoo Use as a foaming face wash in shower daily 120 mL 3     levothyroxine (SYNTHROID/LEVOTHROID) 137 MCG tablet Take 1 tablet by mouth Monday - Saturday  and 1.5 tablets on Sunday 96 tablet 3     lisinopril (PRINIVIL/ZESTRIL) 5 MG tablet TAKE ONE TABLET BY MOUTH EVERY DAY 90 tablet 3     magnesium oxide (MAG-OX) 400 MG tablet TAKE ONE TABLET BY MOUTH TWICE A  tablet 11     metoprolol succinate (TOPROL-XL) 25 MG 24 hr tablet Take 1 tablet (25 mg) by mouth daily 90 tablet 3     mycophenolate (GENERIC EQUIVALENT) 250 MG capsule Take 1 capsule (250 mg) by mouth 2 times daily 60 capsule 11     oxyCODONE-acetaminophen (PERCOCET) 5-325 MG per tablet Take 1 tablet by mouth every 4 hours as needed for pain (moderate to severe) 10 tablet 0     predniSONE (DELTASONE) 5 MG tablet Take 2.5 mg once daily for 14 days, then stop 90 tablet 3     sulfamethoxazole-trimethoprim (BACTRIM/SEPTRA) 400-80 MG per tablet Take 1 tablet by mouth daily 30 tablet 11     tacrolimus (GENERIC EQUIVALENT) 0.5 MG capsule TAKE ONE CAPSULE BY MOUTH TWICE A DAY. (TOTAL DAILY DOSE IS 1.5MG TWO TIMES A DAY). 60 capsule 5     tacrolimus (GENERIC EQUIVALENT) 1 MG capsule Take 1 capsule (1 mg) by mouth 2 times daily 60 capsule 11        Allergies:  Allergies   Allergen Reactions     Hydromorphone Itching         Review of Systems:  Constitutional: No recent fevers, chills, night sweats.  Skin: As above in HPI. Otherwise no additional skin rashes or changes.  No oral sores    Physical exam:  Vitals: There were no vitals taken for this visit.  GEN: This is a well developed, well-nourished male in no acute distress, in a pleasant mood.    SKIN: Focused examination of the face and scalp was performed.  -There is minimal macular erythema of the beard with mild flaky white scale.  -Mild erythema present on the right cheek  -No evidence of any erythema or scale present on scalp.  -No other lesions of concern on areas  examined.     Impression/Plan:  1. Seborrheic dermatitis    The patient has noted improvement of the erythema of his face with ketoconazole 2% shampoo.    Continue ketoconazole shampoo to face and beard    Initiate desonide ointment twice daily as needed for area unable to be treated with ketoconazole      Return to clinic PRN    Dr. Dobbins staffed the patient.    Dami Medina MD  Internal Medicine PGY-3    I have seen and examined this patient and agree with the assessment and plan as documented in the resident's note.    Yuval Dobbins MD  Dermatology Attending

## 2018-10-18 NOTE — NURSING NOTE
"Dermatology Rooming Note    Cricket Chen's goals for this visit include:   Chief Complaint   Patient presents with     Derm Problem     Dermatitis follow up , Cricket states \" The redness under my eye is better.\"      Davida Haskins LPN   "

## 2018-10-30 DIAGNOSIS — Z94.0 KIDNEY TRANSPLANTED: ICD-10-CM

## 2018-10-30 LAB
ANION GAP SERPL CALCULATED.3IONS-SCNC: 8 MMOL/L (ref 3–14)
BUN SERPL-MCNC: 22 MG/DL (ref 7–30)
CALCIUM SERPL-MCNC: 9.6 MG/DL (ref 8.5–10.1)
CHLORIDE SERPL-SCNC: 104 MMOL/L (ref 94–109)
CO2 SERPL-SCNC: 27 MMOL/L (ref 20–32)
CREAT SERPL-MCNC: 1.36 MG/DL (ref 0.66–1.25)
ERYTHROCYTE [DISTWIDTH] IN BLOOD BY AUTOMATED COUNT: 13.2 % (ref 10–15)
GFR SERPL CREATININE-BSD FRML MDRD: 59 ML/MIN/1.7M2
GLUCOSE SERPL-MCNC: 106 MG/DL (ref 70–99)
HCT VFR BLD AUTO: 44.5 % (ref 40–53)
HGB BLD-MCNC: 15.8 G/DL (ref 13.3–17.7)
MCH RBC QN AUTO: 30.7 PG (ref 26.5–33)
MCHC RBC AUTO-ENTMCNC: 35.5 G/DL (ref 31.5–36.5)
MCV RBC AUTO: 87 FL (ref 78–100)
PLATELET # BLD AUTO: 139 10E9/L (ref 150–450)
POTASSIUM SERPL-SCNC: 4.4 MMOL/L (ref 3.4–5.3)
RBC # BLD AUTO: 5.14 10E12/L (ref 4.4–5.9)
SODIUM SERPL-SCNC: 139 MMOL/L (ref 133–144)
TACROLIMUS BLD-MCNC: 4.4 UG/L (ref 5–15)
TME LAST DOSE: ABNORMAL H
WBC # BLD AUTO: 5.5 10E9/L (ref 4–11)

## 2018-10-30 PROCEDURE — 36415 COLL VENOUS BLD VENIPUNCTURE: CPT | Performed by: INTERNAL MEDICINE

## 2018-10-30 PROCEDURE — 80197 ASSAY OF TACROLIMUS: CPT | Performed by: INTERNAL MEDICINE

## 2018-10-30 PROCEDURE — 80048 BASIC METABOLIC PNL TOTAL CA: CPT | Performed by: INTERNAL MEDICINE

## 2018-10-30 PROCEDURE — 87799 DETECT AGENT NOS DNA QUANT: CPT | Performed by: INTERNAL MEDICINE

## 2018-10-30 PROCEDURE — 85027 COMPLETE CBC AUTOMATED: CPT | Performed by: INTERNAL MEDICINE

## 2018-10-31 LAB
BKV DNA # SPEC NAA+PROBE: 2953 COPIES/ML
BKV DNA SPEC NAA+PROBE-LOG#: 3.5 LOG COPIES/ML
SPECIMEN SOURCE: ABNORMAL

## 2018-11-13 ENCOUNTER — TELEPHONE (OUTPATIENT)
Dept: TRANSPLANT | Facility: CLINIC | Age: 38
End: 2018-11-13

## 2018-11-13 DIAGNOSIS — Z94.0 S/P KIDNEY TRANSPLANT: ICD-10-CM

## 2018-11-13 DIAGNOSIS — Z94.0 KIDNEY TRANSPLANTED: ICD-10-CM

## 2018-11-13 DIAGNOSIS — Z94.4 LIVER REPLACED BY TRANSPLANT (H): ICD-10-CM

## 2018-11-13 LAB
ANION GAP SERPL CALCULATED.3IONS-SCNC: 7 MMOL/L (ref 3–14)
BUN SERPL-MCNC: 19 MG/DL (ref 7–30)
CALCIUM SERPL-MCNC: 9.2 MG/DL (ref 8.5–10.1)
CHLORIDE SERPL-SCNC: 107 MMOL/L (ref 94–109)
CO2 SERPL-SCNC: 25 MMOL/L (ref 20–32)
CREAT SERPL-MCNC: 1.35 MG/DL (ref 0.66–1.25)
ERYTHROCYTE [DISTWIDTH] IN BLOOD BY AUTOMATED COUNT: 13.2 % (ref 10–15)
GFR SERPL CREATININE-BSD FRML MDRD: 59 ML/MIN/1.7M2
GLUCOSE SERPL-MCNC: 103 MG/DL (ref 70–99)
HCT VFR BLD AUTO: 42.8 % (ref 40–53)
HGB BLD-MCNC: 15.2 G/DL (ref 13.3–17.7)
MCH RBC QN AUTO: 30.8 PG (ref 26.5–33)
MCHC RBC AUTO-ENTMCNC: 35.5 G/DL (ref 31.5–36.5)
MCV RBC AUTO: 87 FL (ref 78–100)
PLATELET # BLD AUTO: 132 10E9/L (ref 150–450)
POTASSIUM SERPL-SCNC: 4.7 MMOL/L (ref 3.4–5.3)
RBC # BLD AUTO: 4.93 10E12/L (ref 4.4–5.9)
SODIUM SERPL-SCNC: 139 MMOL/L (ref 133–144)
TACROLIMUS BLD-MCNC: 4.3 UG/L (ref 5–15)
TME LAST DOSE: ABNORMAL H
WBC # BLD AUTO: 5.2 10E9/L (ref 4–11)

## 2018-11-13 PROCEDURE — 80048 BASIC METABOLIC PNL TOTAL CA: CPT | Performed by: INTERNAL MEDICINE

## 2018-11-13 PROCEDURE — 80197 ASSAY OF TACROLIMUS: CPT | Performed by: INTERNAL MEDICINE

## 2018-11-13 PROCEDURE — 85027 COMPLETE CBC AUTOMATED: CPT | Performed by: INTERNAL MEDICINE

## 2018-11-13 PROCEDURE — 36415 COLL VENOUS BLD VENIPUNCTURE: CPT | Performed by: INTERNAL MEDICINE

## 2018-11-13 PROCEDURE — 87799 DETECT AGENT NOS DNA QUANT: CPT | Performed by: INTERNAL MEDICINE

## 2018-11-13 RX ORDER — TACROLIMUS 1 MG/1
CAPSULE ORAL
Qty: 60 CAPSULE | Refills: 11 | Status: SHIPPED | OUTPATIENT
Start: 2018-11-13 | End: 2019-03-07

## 2018-11-13 RX ORDER — TACROLIMUS 0.5 MG/1
CAPSULE ORAL
Qty: 60 CAPSULE | Refills: 5 | Status: SHIPPED | OUTPATIENT
Start: 2018-11-13 | End: 2019-01-16

## 2018-11-13 NOTE — TELEPHONE ENCOUNTER
Pt called to report that he is taking 1.5 mg every 12 hours of tacrolimus. And would like the pharmacy updated. New scripts sent to pharmacy.

## 2018-11-14 LAB
BKV DNA # SPEC NAA+PROBE: 5921 COPIES/ML
BKV DNA SPEC NAA+PROBE-LOG#: 3.8 LOG COPIES/ML
SPECIMEN SOURCE: ABNORMAL

## 2018-11-27 DIAGNOSIS — Z94.0 KIDNEY TRANSPLANTED: ICD-10-CM

## 2018-11-27 LAB
ANION GAP SERPL CALCULATED.3IONS-SCNC: 2 MMOL/L (ref 3–14)
BUN SERPL-MCNC: 14 MG/DL (ref 7–30)
CALCIUM SERPL-MCNC: 9.2 MG/DL (ref 8.5–10.1)
CHLORIDE SERPL-SCNC: 111 MMOL/L (ref 94–109)
CO2 SERPL-SCNC: 27 MMOL/L (ref 20–32)
CREAT SERPL-MCNC: 1.21 MG/DL (ref 0.66–1.25)
ERYTHROCYTE [DISTWIDTH] IN BLOOD BY AUTOMATED COUNT: 13.1 % (ref 10–15)
GFR SERPL CREATININE-BSD FRML MDRD: 67 ML/MIN/1.7M2
GLUCOSE SERPL-MCNC: 114 MG/DL (ref 70–99)
HCT VFR BLD AUTO: 42.5 % (ref 40–53)
HGB BLD-MCNC: 14.7 G/DL (ref 13.3–17.7)
MCH RBC QN AUTO: 30.6 PG (ref 26.5–33)
MCHC RBC AUTO-ENTMCNC: 34.6 G/DL (ref 31.5–36.5)
MCV RBC AUTO: 89 FL (ref 78–100)
PLATELET # BLD AUTO: 139 10E9/L (ref 150–450)
POTASSIUM SERPL-SCNC: 4.5 MMOL/L (ref 3.4–5.3)
RBC # BLD AUTO: 4.8 10E12/L (ref 4.4–5.9)
SODIUM SERPL-SCNC: 140 MMOL/L (ref 133–144)
TACROLIMUS BLD-MCNC: 5.1 UG/L (ref 5–15)
TME LAST DOSE: NORMAL H
WBC # BLD AUTO: 5.4 10E9/L (ref 4–11)

## 2018-11-27 PROCEDURE — 80048 BASIC METABOLIC PNL TOTAL CA: CPT | Performed by: INTERNAL MEDICINE

## 2018-11-27 PROCEDURE — 80197 ASSAY OF TACROLIMUS: CPT | Performed by: INTERNAL MEDICINE

## 2018-11-27 PROCEDURE — 87799 DETECT AGENT NOS DNA QUANT: CPT | Performed by: INTERNAL MEDICINE

## 2018-11-27 PROCEDURE — 85027 COMPLETE CBC AUTOMATED: CPT | Performed by: INTERNAL MEDICINE

## 2018-11-27 PROCEDURE — 36415 COLL VENOUS BLD VENIPUNCTURE: CPT | Performed by: INTERNAL MEDICINE

## 2018-11-28 LAB
BKV DNA # SPEC NAA+PROBE: 9876 COPIES/ML
BKV DNA SPEC NAA+PROBE-LOG#: 4 LOG COPIES/ML
SPECIMEN SOURCE: ABNORMAL

## 2018-12-11 DIAGNOSIS — Z94.4 LIVER REPLACED BY TRANSPLANT (H): ICD-10-CM

## 2018-12-11 LAB
ALBUMIN SERPL-MCNC: 4.1 G/DL (ref 3.4–5)
ALP SERPL-CCNC: 78 U/L (ref 40–150)
ALT SERPL W P-5'-P-CCNC: 24 U/L (ref 0–70)
ANION GAP SERPL CALCULATED.3IONS-SCNC: 7 MMOL/L (ref 3–14)
AST SERPL W P-5'-P-CCNC: 18 U/L (ref 0–45)
BILIRUB DIRECT SERPL-MCNC: 0.2 MG/DL (ref 0–0.2)
BILIRUB SERPL-MCNC: 0.7 MG/DL (ref 0.2–1.3)
BUN SERPL-MCNC: 18 MG/DL (ref 7–30)
CALCIUM SERPL-MCNC: 8.9 MG/DL (ref 8.5–10.1)
CHLORIDE SERPL-SCNC: 106 MMOL/L (ref 94–109)
CO2 SERPL-SCNC: 25 MMOL/L (ref 20–32)
CREAT SERPL-MCNC: 1.32 MG/DL (ref 0.66–1.25)
ERYTHROCYTE [DISTWIDTH] IN BLOOD BY AUTOMATED COUNT: 12.9 % (ref 10–15)
GFR SERPL CREATININE-BSD FRML MDRD: 61 ML/MIN/1.7M2
GLUCOSE SERPL-MCNC: 102 MG/DL (ref 70–99)
HCT VFR BLD AUTO: 44.2 % (ref 40–53)
HGB BLD-MCNC: 15.5 G/DL (ref 13.3–17.7)
MCH RBC QN AUTO: 30.8 PG (ref 26.5–33)
MCHC RBC AUTO-ENTMCNC: 35.1 G/DL (ref 31.5–36.5)
MCV RBC AUTO: 88 FL (ref 78–100)
PLATELET # BLD AUTO: 135 10E9/L (ref 150–450)
POTASSIUM SERPL-SCNC: 4.3 MMOL/L (ref 3.4–5.3)
PROT SERPL-MCNC: 7.2 G/DL (ref 6.8–8.8)
RBC # BLD AUTO: 5.03 10E12/L (ref 4.4–5.9)
SODIUM SERPL-SCNC: 138 MMOL/L (ref 133–144)
TACROLIMUS BLD-MCNC: 4.3 UG/L (ref 5–15)
TME LAST DOSE: ABNORMAL H
WBC # BLD AUTO: 4.9 10E9/L (ref 4–11)

## 2018-12-11 PROCEDURE — 85027 COMPLETE CBC AUTOMATED: CPT | Performed by: INTERNAL MEDICINE

## 2018-12-11 PROCEDURE — 80197 ASSAY OF TACROLIMUS: CPT | Performed by: INTERNAL MEDICINE

## 2018-12-11 PROCEDURE — 80076 HEPATIC FUNCTION PANEL: CPT | Performed by: INTERNAL MEDICINE

## 2018-12-11 PROCEDURE — 80048 BASIC METABOLIC PNL TOTAL CA: CPT | Performed by: INTERNAL MEDICINE

## 2018-12-11 PROCEDURE — 36415 COLL VENOUS BLD VENIPUNCTURE: CPT | Performed by: INTERNAL MEDICINE

## 2018-12-26 DIAGNOSIS — Z94.0 KIDNEY TRANSPLANTED: ICD-10-CM

## 2018-12-26 LAB
ANION GAP SERPL CALCULATED.3IONS-SCNC: 6 MMOL/L (ref 3–14)
BUN SERPL-MCNC: 17 MG/DL (ref 7–30)
CALCIUM SERPL-MCNC: 9.2 MG/DL (ref 8.5–10.1)
CHLORIDE SERPL-SCNC: 107 MMOL/L (ref 94–109)
CO2 SERPL-SCNC: 26 MMOL/L (ref 20–32)
CREAT SERPL-MCNC: 1.34 MG/DL (ref 0.66–1.25)
ERYTHROCYTE [DISTWIDTH] IN BLOOD BY AUTOMATED COUNT: 13.1 % (ref 10–15)
GFR SERPL CREATININE-BSD FRML MDRD: 67 ML/MIN/{1.73_M2}
GLUCOSE SERPL-MCNC: 104 MG/DL (ref 70–99)
HCT VFR BLD AUTO: 46.2 % (ref 40–53)
HGB BLD-MCNC: 16.2 G/DL (ref 13.3–17.7)
MCH RBC QN AUTO: 30.6 PG (ref 26.5–33)
MCHC RBC AUTO-ENTMCNC: 35.1 G/DL (ref 31.5–36.5)
MCV RBC AUTO: 87 FL (ref 78–100)
PLATELET # BLD AUTO: 155 10E9/L (ref 150–450)
POTASSIUM SERPL-SCNC: 4.3 MMOL/L (ref 3.4–5.3)
RBC # BLD AUTO: 5.3 10E12/L (ref 4.4–5.9)
SODIUM SERPL-SCNC: 139 MMOL/L (ref 133–144)
TACROLIMUS BLD-MCNC: 4.4 UG/L (ref 5–15)
TME LAST DOSE: ABNORMAL H
WBC # BLD AUTO: 5 10E9/L (ref 4–11)

## 2018-12-26 PROCEDURE — 36415 COLL VENOUS BLD VENIPUNCTURE: CPT | Performed by: INTERNAL MEDICINE

## 2018-12-26 PROCEDURE — 80197 ASSAY OF TACROLIMUS: CPT | Performed by: INTERNAL MEDICINE

## 2018-12-26 PROCEDURE — 87799 DETECT AGENT NOS DNA QUANT: CPT | Performed by: INTERNAL MEDICINE

## 2018-12-26 PROCEDURE — 85027 COMPLETE CBC AUTOMATED: CPT | Performed by: INTERNAL MEDICINE

## 2018-12-26 PROCEDURE — 80048 BASIC METABOLIC PNL TOTAL CA: CPT | Performed by: INTERNAL MEDICINE

## 2018-12-28 ENCOUNTER — TELEPHONE (OUTPATIENT)
Dept: TRANSPLANT | Facility: CLINIC | Age: 38
End: 2018-12-28

## 2018-12-28 DIAGNOSIS — Z94.4 LIVER TRANSPLANTED (H): Primary | ICD-10-CM

## 2018-12-28 DIAGNOSIS — Z94.0 KIDNEY REPLACED BY TRANSPLANT: ICD-10-CM

## 2018-12-28 NOTE — TELEPHONE ENCOUNTER
ISSUE:  Increasing BK DNA 11,490 copies/mL, log 4.1  31 months post liver-kidney transplant with history of BK viremia  Also history of PTLD  Stable liver and kidney graft function    History of BK viremia with low viral load. BK DNA increasing from log of 3.5, to 3.8, to 4.1. Over past 3-4 months.    PLAN: per Dr. Acuna  - check BK PCR, EBV PCR and IgG levels in 2 weeks  - if IgG is low, consider 500 mg IVIG infusion    LPN TASK:  Call patient with instructions for blood tests.  Place laboratory orders in Epic.    C/C: Elvia Baires

## 2018-12-28 NOTE — TELEPHONE ENCOUNTER
Patient returned call to v\u to complete requested labs in 2 weeks and if IgG return low anticipate IVIG infusion. Orders placed

## 2018-12-31 DIAGNOSIS — Z94.0 KIDNEY REPLACED BY TRANSPLANT: Primary | ICD-10-CM

## 2018-12-31 RX ORDER — MYCOPHENOLATE MOFETIL 250 MG/1
CAPSULE ORAL
Qty: 60 CAPSULE | Refills: 10 | Status: SHIPPED | OUTPATIENT
Start: 2018-12-31 | End: 2019-01-16

## 2019-01-08 ENCOUNTER — ANCILLARY PROCEDURE (OUTPATIENT)
Dept: CT IMAGING | Facility: CLINIC | Age: 39
End: 2019-01-08
Attending: INTERNAL MEDICINE
Payer: COMMERCIAL

## 2019-01-08 DIAGNOSIS — Z94.4 LIVER TRANSPLANTED (H): ICD-10-CM

## 2019-01-08 DIAGNOSIS — Z94.0 KIDNEY TRANSPLANTED: ICD-10-CM

## 2019-01-08 DIAGNOSIS — Z94.0 KIDNEY REPLACED BY TRANSPLANT: ICD-10-CM

## 2019-01-08 DIAGNOSIS — Z94.4 LIVER REPLACED BY TRANSPLANT (H): ICD-10-CM

## 2019-01-08 DIAGNOSIS — D47.Z1 PTLD (POST-TRANSPLANT LYMPHOPROLIFERATIVE DISORDER) (H): ICD-10-CM

## 2019-01-08 LAB
ANION GAP SERPL CALCULATED.3IONS-SCNC: 6 MMOL/L (ref 3–14)
BUN SERPL-MCNC: 18 MG/DL (ref 7–30)
CALCIUM SERPL-MCNC: 9.1 MG/DL (ref 8.5–10.1)
CHLORIDE SERPL-SCNC: 108 MMOL/L (ref 94–109)
CO2 SERPL-SCNC: 24 MMOL/L (ref 20–32)
CREAT SERPL-MCNC: 1.3 MG/DL (ref 0.66–1.25)
ERYTHROCYTE [DISTWIDTH] IN BLOOD BY AUTOMATED COUNT: 12.5 % (ref 10–15)
GFR SERPL CREATININE-BSD FRML MDRD: 69 ML/MIN/{1.73_M2}
GLUCOSE SERPL-MCNC: 110 MG/DL (ref 70–99)
HCT VFR BLD AUTO: 45.4 % (ref 40–53)
HGB BLD-MCNC: 15.8 G/DL (ref 13.3–17.7)
IGG SERPL-MCNC: 711 MG/DL (ref 695–1620)
MCH RBC QN AUTO: 30.3 PG (ref 26.5–33)
MCHC RBC AUTO-ENTMCNC: 34.8 G/DL (ref 31.5–36.5)
MCV RBC AUTO: 87 FL (ref 78–100)
PLATELET # BLD AUTO: 140 10E9/L (ref 150–450)
POTASSIUM SERPL-SCNC: 4.2 MMOL/L (ref 3.4–5.3)
RBC # BLD AUTO: 5.22 10E12/L (ref 4.4–5.9)
SODIUM SERPL-SCNC: 138 MMOL/L (ref 133–144)
TACROLIMUS BLD-MCNC: 5.6 UG/L (ref 5–15)
TME LAST DOSE: NORMAL H
WBC # BLD AUTO: 4.3 10E9/L (ref 4–11)

## 2019-01-08 RX ORDER — HEPARIN SODIUM (PORCINE) LOCK FLUSH IV SOLN 100 UNIT/ML 100 UNIT/ML
5 SOLUTION INTRAVENOUS ONCE
Status: COMPLETED | OUTPATIENT
Start: 2019-01-08 | End: 2019-01-08

## 2019-01-08 RX ORDER — IOPAMIDOL 755 MG/ML
116 INJECTION, SOLUTION INTRAVASCULAR ONCE
Status: COMPLETED | OUTPATIENT
Start: 2019-01-08 | End: 2019-01-08

## 2019-01-08 RX ADMIN — HEPARIN SODIUM (PORCINE) LOCK FLUSH IV SOLN 100 UNIT/ML 5 ML: 100 SOLUTION at 12:19

## 2019-01-08 RX ADMIN — IOPAMIDOL 116 ML: 755 INJECTION, SOLUTION INTRAVASCULAR at 12:13

## 2019-01-08 NOTE — DISCHARGE INSTRUCTIONS

## 2019-01-09 LAB
BKV DNA # SPEC NAA+PROBE: 2044 COPIES/ML
BKV DNA SPEC NAA+PROBE-LOG#: 3.3 LOG COPIES/ML
EBV DNA # SPEC NAA+PROBE: NORMAL {COPIES}/ML
EBV DNA SPEC NAA+PROBE-LOG#: NORMAL {LOG_COPIES}/ML
SPECIMEN SOURCE: ABNORMAL

## 2019-01-10 ENCOUNTER — TELEPHONE (OUTPATIENT)
Dept: TRANSPLANT | Facility: CLINIC | Age: 39
End: 2019-01-10

## 2019-01-10 DIAGNOSIS — Z94.0 KIDNEY REPLACED BY TRANSPLANT: Primary | ICD-10-CM

## 2019-01-10 NOTE — TELEPHONE ENCOUNTER
ISSUE:   Tacrolimus level 5.6 on 1/8, goal 3-5, dose 1.5 mg BID    PLAN:   Please call pt and confirm this was a good 12-hour trough. Verify dose 1.5 mg BID. Confirm no new medications or illness (jessica. Diarrhea).   Previous levels have been within goal and no recent dose changes.  Plan to repeat level in 1-2 weeks    LPN TASK:  Call with above instructions   Update lab letter

## 2019-01-11 NOTE — TELEPHONE ENCOUNTER
Call placed to patient . Patient confirms current dose and accurate trough level. Patient states that he has a cold otherwise no diarrhea or other medication changes. Patient v\u to recheck level in 1-2 weeks. Order sent

## 2019-01-14 ENCOUNTER — TELEPHONE (OUTPATIENT)
Dept: TRANSPLANT | Facility: CLINIC | Age: 39
End: 2019-01-14

## 2019-01-14 NOTE — TELEPHONE ENCOUNTER
Pt called to report that his temp 96.2, he is shaking, his hands are ice cold, he has a dry cough, fatigue and this has been going on for about a  week. Encouraged  patient to be seen by a provider today. Patient thought it was a common cold but he is not getting better.

## 2019-01-15 ENCOUNTER — APPOINTMENT (OUTPATIENT)
Dept: LAB | Facility: CLINIC | Age: 39
End: 2019-01-15
Attending: INTERNAL MEDICINE
Payer: COMMERCIAL

## 2019-01-15 ENCOUNTER — ONCOLOGY VISIT (OUTPATIENT)
Dept: ONCOLOGY | Facility: CLINIC | Age: 39
End: 2019-01-15
Attending: INTERNAL MEDICINE
Payer: COMMERCIAL

## 2019-01-15 VITALS
OXYGEN SATURATION: 98 % | RESPIRATION RATE: 18 BRPM | BODY MASS INDEX: 29.06 KG/M2 | SYSTOLIC BLOOD PRESSURE: 129 MMHG | WEIGHT: 203 LBS | HEART RATE: 83 BPM | HEIGHT: 70 IN | TEMPERATURE: 97.8 F | DIASTOLIC BLOOD PRESSURE: 80 MMHG

## 2019-01-15 DIAGNOSIS — Z94.0 KIDNEY REPLACED BY TRANSPLANT: ICD-10-CM

## 2019-01-15 DIAGNOSIS — Z94.0 KIDNEY TRANSPLANTED: ICD-10-CM

## 2019-01-15 DIAGNOSIS — D47.Z1 POST-TRANSPLANT LYMPHOPROLIFERATIVE DISORDER (H): ICD-10-CM

## 2019-01-15 LAB
ALBUMIN SERPL-MCNC: 4.1 G/DL (ref 3.4–5)
ALP SERPL-CCNC: 88 U/L (ref 40–150)
ALT SERPL W P-5'-P-CCNC: 21 U/L (ref 0–70)
ANION GAP SERPL CALCULATED.3IONS-SCNC: 7 MMOL/L (ref 3–14)
AST SERPL W P-5'-P-CCNC: 15 U/L (ref 0–45)
BASOPHILS # BLD AUTO: 0 10E9/L (ref 0–0.2)
BASOPHILS NFR BLD AUTO: 0.6 %
BILIRUB SERPL-MCNC: 0.9 MG/DL (ref 0.2–1.3)
BUN SERPL-MCNC: 18 MG/DL (ref 7–30)
CALCIUM SERPL-MCNC: 9 MG/DL (ref 8.5–10.1)
CHLORIDE SERPL-SCNC: 104 MMOL/L (ref 94–109)
CO2 SERPL-SCNC: 24 MMOL/L (ref 20–32)
CREAT SERPL-MCNC: 1.35 MG/DL (ref 0.66–1.25)
DIFFERENTIAL METHOD BLD: NORMAL
EOSINOPHIL # BLD AUTO: 0.1 10E9/L (ref 0–0.7)
EOSINOPHIL NFR BLD AUTO: 1.3 %
ERYTHROCYTE [DISTWIDTH] IN BLOOD BY AUTOMATED COUNT: 12.3 % (ref 10–15)
GFR SERPL CREATININE-BSD FRML MDRD: 66 ML/MIN/{1.73_M2}
GLUCOSE SERPL-MCNC: 96 MG/DL (ref 70–99)
HCT VFR BLD AUTO: 43.8 % (ref 40–53)
HGB BLD-MCNC: 15.2 G/DL (ref 13.3–17.7)
IMM GRANULOCYTES # BLD: 0 10E9/L (ref 0–0.4)
IMM GRANULOCYTES NFR BLD: 0.4 %
LDH SERPL L TO P-CCNC: 151 U/L (ref 85–227)
LYMPHOCYTES # BLD AUTO: 0.8 10E9/L (ref 0.8–5.3)
LYMPHOCYTES NFR BLD AUTO: 16.6 %
MCH RBC QN AUTO: 30.2 PG (ref 26.5–33)
MCHC RBC AUTO-ENTMCNC: 34.7 G/DL (ref 31.5–36.5)
MCV RBC AUTO: 87 FL (ref 78–100)
MONOCYTES # BLD AUTO: 0.4 10E9/L (ref 0–1.3)
MONOCYTES NFR BLD AUTO: 8.6 %
NEUTROPHILS # BLD AUTO: 3.5 10E9/L (ref 1.6–8.3)
NEUTROPHILS NFR BLD AUTO: 72.5 %
NRBC # BLD AUTO: 0 10*3/UL
NRBC BLD AUTO-RTO: 0 /100
PLATELET # BLD AUTO: 152 10E9/L (ref 150–450)
POTASSIUM SERPL-SCNC: 3.9 MMOL/L (ref 3.4–5.3)
PROT SERPL-MCNC: 7.3 G/DL (ref 6.8–8.8)
RBC # BLD AUTO: 5.03 10E12/L (ref 4.4–5.9)
SODIUM SERPL-SCNC: 135 MMOL/L (ref 133–144)
WBC # BLD AUTO: 4.8 10E9/L (ref 4–11)

## 2019-01-15 PROCEDURE — 25000128 H RX IP 250 OP 636: Mod: ZF | Performed by: INTERNAL MEDICINE

## 2019-01-15 PROCEDURE — 36591 DRAW BLOOD OFF VENOUS DEVICE: CPT

## 2019-01-15 PROCEDURE — 85025 COMPLETE CBC W/AUTO DIFF WBC: CPT | Performed by: INTERNAL MEDICINE

## 2019-01-15 PROCEDURE — 83615 LACTATE (LD) (LDH) ENZYME: CPT | Performed by: INTERNAL MEDICINE

## 2019-01-15 PROCEDURE — 80053 COMPREHEN METABOLIC PANEL: CPT | Performed by: INTERNAL MEDICINE

## 2019-01-15 PROCEDURE — 99214 OFFICE O/P EST MOD 30 MIN: CPT | Mod: ZP | Performed by: INTERNAL MEDICINE

## 2019-01-15 PROCEDURE — G0463 HOSPITAL OUTPT CLINIC VISIT: HCPCS | Mod: ZF

## 2019-01-15 PROCEDURE — 87799 DETECT AGENT NOS DNA QUANT: CPT | Performed by: INTERNAL MEDICINE

## 2019-01-15 RX ORDER — HEPARIN SODIUM (PORCINE) LOCK FLUSH IV SOLN 100 UNIT/ML 100 UNIT/ML
5 SOLUTION INTRAVENOUS EVERY 8 HOURS
Status: DISCONTINUED | OUTPATIENT
Start: 2019-01-15 | End: 2019-01-15 | Stop reason: HOSPADM

## 2019-01-15 RX ADMIN — HEPARIN 5 ML: 100 SYRINGE at 11:18

## 2019-01-15 ASSESSMENT — PAIN SCALES - GENERAL: PAINLEVEL: NO PAIN (0)

## 2019-01-15 ASSESSMENT — MIFFLIN-ST. JEOR: SCORE: 1847.05

## 2019-01-15 NOTE — LETTER
1/15/2019       RE: Cricket Chen  [address]  [facility] [address]     Dear Colleague,    Thank you for referring your patient, Cricket Chen, to the [facility]. Please see a copy of my visit note below.        [facility]  HEMATOLOGY AND MEDICAL ONCOLOGY    FOLLOW UP APPOINTMENT    PATIENT NAME: Cricket Chen   MRN# [id number]     Date of Visit: George 15, 2019    Referring Provider: Girish Narayanan MD  [address] [address]  [place] [address] YOB: 1980     Reason for visit: 39 yo man with  PTLD, treated with 4 weekly Rituxan and 4 cycles of R-CEOP presenting for a follow up.      CHIEF COMPLAINT   Port Draw (Right port accessed with a gripper needle, labs drawn and sent, flushed with saline and heparin, deaccessed, vitals completed, checked into next appointment.) and Oncology Clinic Visit (PTLD)       HISTORY OF PRESENTING ILLNESS     Mr. 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  CellCept was reduced from 1500 b.i.d. to 750 twice a day, and  tacrolimus 3 mg twice a day was decreased to 2 mg twice a day. He tolerated the RSST well and completed 4 weekly infusions of rituximab followed by R-COEP x4. He underwent end of Rx PET/CT scan and he had achieved CR1. Notably, his diagnostic PET/CT scan identified a thyroid nodule, which was consistent with papillary thyroid carcinoma based on the ultrasound-guided fine needle aspiration and he underwent thyroidectomy.     1/15/2019: Mr. Chen presents to clinic for follow up appointment. He denies any fevers or night sweats. He reports one week history of night chills. He has good energy. He has good appetite and has gained approximately 10 pounds since last appointment. He feels that his hands have  been feeling cold for the last one week. He is followed by the  Nephrology and the transplant team. He is on mycophenolate 250 mg twice daily, tacrolimus 1.5 mg twice daily. He has stopped the prednisone.  His last BK  was 2,044 on 1/8/2019 which was decreased from  4804 on 8/7/2018.      PAST MEDICAL HISTORY     Past Medical History:   Diagnosis Date     Alcohol abuse     Last drink in Mid-April 2014     Anemia in ESRD (end-stage renal disease) (H)      Anxiety 2008     Atrial flutter (H)      Cirrhosis (H)     S/P liver transplant     Depression      History of blood transfusion      History of transposition of great vessels     atrial switch at age 8 months old     Hypertension      Liver transplant recipient (H)     2016     Papillary thyroid carcinoma (H)      Pneumonia 11-15-14     Renal transplant recipient     2016     Varices, esophageal (H)         PAST SURGICAL HISTORY     Past Surgical History:   Procedure Laterality Date     ANESTHESIA CARDIOVERSION N/A 3/7/2018    Procedure: ANESTHESIA CARDIOVERSION;  Cardioversion;  Surgeon: GENERIC ANESTHESIA PROVIDER;  [facility]     BENCH LIVER N/A 5/10/2016    Procedure: BENCH LIVER;  Surgeon: Ricky Deshpande MD;  [facility]     BIOPSY LYMPH NODE CERVICAL Right 1/26/2017    Procedure: BIOPSY LYMPH NODE CERVICAL;  Surgeon: Beka Soto MD;  [facility]     CARDIAC SURGERY  [birth year]-10[birth year]80     CREATE FISTULA ARTERIOVENOUS UPPER EXTREMITY Right 9/16/2014    Procedure: CREATE FISTULA ARTERIOVENOUS UPPER EXTREMITY;  Surgeon: Padmaja Eaton MD;  [facility]     CYSTOSCOPY, REMOVE STENT(S), COMBINED Right 6/22/2016    Procedure: COMBINED CYSTOSCOPY, REMOVE STENT(S);  Surgeon: Ricky Deshpande MD;  [facility]     EMBOLECTOMY UPPER EXTREMITY Right 8/17/2018    Procedure: EMBOLECTOMY UPPER EXTREMITY;  Repair Right Upper Arm Pseudo Aneursym ;  Surgeon: John Payton MD;  [facility]     ENT SURGERY       ESOPHAGOSCOPY, GASTROSCOPY,  DUODENOSCOPY (EGD), COMBINED  2014    Procedure: COMBINED ESOPHAGOSCOPY, GASTROSCOPY, DUODENOSCOPY (EGD);  Surgeon: Guillaume Bautista MD;  [facility] GI     ESOPHAGOSCOPY, GASTROSCOPY, DUODENOSCOPY (EGD), COMBINED  14     ESOPHAGOSCOPY, GASTROSCOPY, DUODENOSCOPY (EGD), COMBINED Left 3/12/2015    Procedure: COMBINED ESOPHAGOSCOPY, GASTROSCOPY, DUODENOSCOPY (EGD);  Surgeon: Laureen Galvan MD;  [facility]     ESOPHAGOSCOPY, GASTROSCOPY, DUODENOSCOPY (EGD), COMBINED N/A 2016    Procedure: COMBINED ESOPHAGOSCOPY, GASTROSCOPY, DUODENOSCOPY (EGD);  Surgeon: Laureen Galvan MD;  [facility]     ESOPHAGOSCOPY, GASTROSCOPY, DUODENOSCOPY (EGD), COMBINED N/A 2016    Procedure: COMBINED ESOPHAGOSCOPY, GASTROSCOPY, DUODENOSCOPY (EGD);  Surgeon: Laureen Galvan MD;  [facility] GI     HC OR CATH ABLATION NON-CARDIAC ENDOVASCULAR      SVT     INSERT PORT VASCULAR ACCESS N/A 4/3/2017    Procedure: INSERT PORT VASCULAR ACCESS;  Surgeon: Rajendra Jacques PA-C;  [facility]     LIGATE FISTULA ARTERIOVENOUS UPPER EXTREMITY Right 2017    Procedure: LIGATE FISTULA ARTERIOVENOUS UPPER EXTREMITY;  Revision of Right Arm Arteriovenous Fistula ;  Surgeon: Padmaja Eaton MD;  [facility]     PERCUTANEOUS BIOPSY KIDNEY Right 2018    Procedure: PERCUTANEOUS BIOPSY KIDNEY;  Right Kidney Biopsy;  Surgeon: Yuval Khan MD;  [facility]     PICC INSERTION  14    PICC line placement 14; Removal 2014     THORACIC SURGERY  1980    Transposition great arteries, repaired at 8 months     THYROIDECTOMY Right 2017    Procedure: THYROIDECTOMY;  Bilateral Total Thyroidectomy ;  Surgeon: Beka Soto MD;  [facility]     TRANSPLANT KIDNEY RECIPIENT  DONOR  5/10/2016    Procedure: TRANSPLANT KIDNEY RECIPIENT  DONOR;  Surgeon: Ricky Deshpande MD;  [facility]     TRANSPLANT LIVER RECIPIENT   DONOR N/A 5/10/2016    Procedure: TRANSPLANT LIVER RECIPIENT  DONOR;  Surgeon: Ricky Deshpande MD;  [facility]         CURRENT OUTPATIENT MEDICATIONS     Current Outpatient Medications   Medication Sig     amLODIPine (NORVASC) 10 MG tablet TAKE ONE TABLET BY MOUTH EVERY DAY     apixaban ANTICOAGULANT (ELIQUIS) 5 MG tablet Take 1 tablet (5 mg) by mouth 2 times daily     desonide (DESOWEN) 0.05 % ointment Apply topically 2 times daily     ketoconazole (NIZORAL) 2 % cream Twice daily to areas of rash on face     ketoconazole (NIZORAL) 2 % shampoo Use as a foaming face wash in shower daily     levothyroxine (SYNTHROID/LEVOTHROID) 137 MCG tablet Take 1 tablet by mouth Monday - Saturday  and 1.5 tablets on      lisinopril (PRINIVIL/ZESTRIL) 5 MG tablet TAKE ONE TABLET BY MOUTH EVERY DAY     magnesium oxide (MAG-OX) 400 MG tablet TAKE ONE TABLET BY MOUTH TWICE A DAY     metoprolol succinate (TOPROL-XL) 25 MG 24 hr tablet Take 1 tablet (25 mg) by mouth daily     mycophenolate (GENERIC EQUIVALENT) 250 MG capsule TAKE ONE CAPSULE BY MOUTH TWICE A DAY     mycophenolate (GENERIC EQUIVALENT) 250 MG capsule Take 1 capsule (250 mg) by mouth 2 times daily     oxyCODONE-acetaminophen (PERCOCET) 5-325 MG per tablet Take 1 tablet by mouth every 4 hours as needed for pain (moderate to severe)     predniSONE (DELTASONE) 5 MG tablet Take 2.5 mg once daily for 14 days, then stop     sulfamethoxazole-trimethoprim (BACTRIM/SEPTRA) 400-80 MG per tablet Take 1 tablet by mouth daily     tacrolimus (GENERIC EQUIVALENT) 0.5 MG capsule TAKE ONE CAPSULE BY MOUTH TWICE A DAY. (TOTAL DAILY DOSE IS 1.5MG TWO TIMES A DAY).     tacrolimus (GENERIC EQUIVALENT) 1 MG capsule 1.5 mg every 12 hours     Current Facility-Administered Medications   Medication     heparin 100 UNIT/ML injection 5 mL     sodium chloride (PF) 0.9% PF flush 20 mL        ALLERGIES     Allergies   Allergen Reactions     Hydromorphone Itching     .     SOCIAL  HISTORY     Social History     Socioeconomic History     Marital status: Single     Spouse name: Not on file     Number of children: 0     Years of education: Not on file     Highest education level: Not on file   Social Needs     Financial resource strain: Not on file     Food insecurity - worry: Not on file     Food insecurity - inability: Not on file     Transportation needs - medical: Not on file     Transportation needs - non-medical: Not on file   Occupational History     Employer: UNEMPLOYED   Tobacco Use     Smoking status: Former Smoker     Packs/day: 0.33     Years: 3.00     Pack years: 0.99     Types: Cigarettes     Start date: 1997     Last attempt to quit: 2000     Years since quittin.3     Smokeless tobacco: Former User   Substance and Sexual Activity     Alcohol use: No     Alcohol/week: 0.0 oz     Comment: ~10 drinks per day for ten years, quit in 2014     Drug use: No     Sexual activity: Not Currently     Partners: Female     Birth control/protection: None   Other Topics Concern     Parent/sibling w/ CABG, MI or angioplasty before 65F 55M? Not Asked   Social History Narrative    ? Blood transfusion as baby during surgery,    Professional performed tattoos     No Illicit IV or intranasal drug use.           FAMILY HISTORY     Family History   Problem Relation Age of Onset     Hypertension Brother      Hypertension Sister      Arthritis Father      Hypertension Father      Hypertension Mother      Hypertension Sister      Alcohol/Drug No family hx of      Gastrointestinal Disease No family hx of         no fam hx of liver disease or liver cancer          REVIEW OF SYSTEMS   Pertinent positives have been included in HPI;  Review Of Systems  General: per HPI  Skin: negative for rash  Eyes: negative for visual blurring  Ears/Nose/Throat: negative for hearing loss  Respiratory: No shortness of breath, dyspnea on exertion, cough, or hemoptysis  Cardiovascular: negative for  "palpitations and tachycardia  Gastrointestinal: negative for nausea, vomiting and abdominal pain  Genitourinary: negative for nocturia and dysuria  Musculoskeletal: negative for muscular pain or bone pain  Neurologic: negative for migraine headaches  Psychiatric: negative for anxiety  Hematologic/Lymphatic/Immunologic: as per hpi  Endocrine: as per hpi       PHYSICAL EXAM   /80 (BP Location: Left arm, Patient Position: Sitting, Cuff Size: Adult Regular)   Pulse 83   Temp 97.8  F (36.6  C) (Oral)   Resp 18   Ht 1.778 m (5' 10\")   Wt 92.1 kg (203 lb)   SpO2 98%   BMI 29.13 kg/m     General appearance: pleasant man not in acute distress  Eyes, Ears, Nose, Throat & Mouth:pupils equal and reactive to light and accommodation, extraocular movements intact, no icterus, injection or pallor. Oropharynx is clear.  Neck: supple, see hem  Respiratory: clear to auscultation bilaterally  Cardiovascular: regular, no murmurs, rubs, or gallops  Thorax: PORT in place  Gastrointenstinal: soft, non-tender, non-distended, normal bowel sounds, no hepatosplenomegaly  Extremities: warm, well perfused, no edema  Skin: no rash  Hematologic/Lymph: no cervical or inguinal lymphadenopathy, 1x1cm mobile right axillary lymph node     LABORATORY AND IMAGING STUDIES     Recent Labs   Lab Test  08/07/18   1435  08/07/18   1116  07/25/18   1721   04/17/18   1129   WBC  6.5  5.3  6.1   < >  4.0   RBC  5.08  4.98  4.94   < >  4.94   HGB  15.2  15.5  15.0   < >  14.9   HCT  46.0  45.1  45.2   < >  43.5   MCV  91  91  92   < >  88   MCH  29.9  31.1  30.4   < >  30.2   MCHC  33.0  34.4  33.2   < >  34.3   RDW  12.8  13.6  12.8   < >  13.7   PLT  135*  131*  127*   < >  130*   NEUTROPHIL  81.5   --   85.3   --   78.3   ANEU  5.3   --   5.2   --   3.2   ALYM  0.6*   --   0.5*   --   0.5*   JANE  0.5   --   0.4   --   0.3   AEOS  0.0   --   0.0   --   0.1    < > = values in this interval not displayed.     Recent Labs   Lab Test  08/07/18   4285  " 08/07/18   1116  07/25/18   1721   NA  138  139  136   POTASSIUM  4.1  4.0  4.2   CHLORIDE  106  106  104   CO2  26  29  25   ANIONGAP  6  4  7   GLC  91  91  106*   BUN  13  14  18   CR  1.20  1.39*  1.39*   COLBY  9.1  8.9  9.0     Recent Labs   Lab Test  08/07/18   1435  07/25/18   1721  06/12/18   1123   BILITOTAL  0.8  0.5  0.5   ALKPHOS  70  71  77   AST  22  16  17   ALT  30  22  23     Recent Labs   Lab Test  08/07/18   1435  07/25/18   1721  04/17/18   1129   LDH  151  166  151     Recent Labs   Lab Test 01/08/19  1125 09/18/18  1114 01/02/18  1225 02/01/17  1104    [phone]   IGM  --   --   --  82   IGA  --   --   --  150     Recent Labs   Lab Test 02/01/17  1104   ELPM 0.0   ELPINT Essentially normal electrophoretic pattern.  No monoclonal protein seen.   Pathologic significance requires clinical correlation.  PATRICIA Moody M.D.,   Ph.D., Pathologist ().         ECOG PS: 0   ASSESSMENT AND RECOMMENDATIONS     Mr. Chen is a 37yo man with past medical history of combined liver/kidney transplant in 05/2016 complicated by EBV+ monomorphic PTLD stage III on 02/2017 with non-GCB DLBCL s/p  RSST protocol with 4 weekly infusions of rituximab followed by R-COEP x4.  He is here for his follow-up appointment. He is doing well. Imaging review consistent with CR on 1/8/2019. He is on immunosuppression with tacrolimus and mycophenolate. He is following with transplant team.  We do recommend to taper down mycophenolate if that is feasible from a transplant standpoint.  If not, we would advise to keep him on the lowest effective dose to mitigate the risk for PTLD recurrence. He will come back in clinic in 6 months with labs and surveillance CT scan. Will place an IR consult to remove the PORT placement.    Carter Prather MD   of Medicine  Division of Hematology, Oncology and Transplantation  [facility]

## 2019-01-15 NOTE — PROGRESS NOTES
[facility] PHYSICIANS  HEMATOLOGY AND MEDICAL ONCOLOGY    FOLLOW UP APPOINTMENT    PATIENT NAME: Cricket Chen   MRN# [id number]     Date of Visit: George 15, 2019    Referring Provider: Girish Narayanan MD  [address] [address] YOB: 1980     Reason for visit: 39 yo man with  PTLD, treated with 4 weekly Rituxan and 4 cycles of R-CEOP presenting for a follow up.      CHIEF COMPLAINT   Port Draw (Right port accessed with a gripper needle, labs drawn and sent, flushed with saline and heparin, deaccessed, vitals completed, checked into next appointment.) and Oncology Clinic Visit (PTLD)       HISTORY OF PRESENTING ILLNESS     Mr. 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  CellCept was reduced from 1500 b.i.d. to 750 twice a day, and  tacrolimus 3 mg twice a day was decreased to 2 mg twice a day. He tolerated the RSST well and completed 4 weekly infusions of rituximab followed by R-COEP x4. He underwent end of Rx PET/CT scan and he had achieved CR1. Notably, his diagnostic PET/CT scan identified a thyroid nodule, which was consistent with papillary thyroid carcinoma based on the ultrasound-guided fine needle aspiration and he underwent thyroidectomy.     1/15/2019: Mr. Chen presents to clinic for follow up appointment. He denies any fevers or night sweats. He reports one week history of night chills. He has good energy. He has good appetite and has gained approximately 10 pounds since last appointment. He feels that his hands have been feeling cold for the last one week. He is followed by the  Nephrology and the transplant team. He is on mycophenolate 250 mg twice daily, tacrolimus 1.5 mg twice daily. He has stopped the prednisone.  His last BK  was 2,044 on 1/8/2019 which was  decreased from  4804 on 8/7/2018.      PAST MEDICAL HISTORY     Past Medical History:   Diagnosis Date     Alcohol abuse     Last drink in Mid-April 2014     Anemia in ESRD (end-stage renal disease) (H)      Anxiety 2008     Atrial flutter (H)      Cirrhosis (H)     S/P liver transplant     Depression      History of blood transfusion      History of transposition of great vessels     atrial switch at age 8 months old     Hypertension      Liver transplant recipient (H)     2016     Papillary thyroid carcinoma (H)      Pneumonia 11-15-14     Renal transplant recipient     2016     Varices, esophageal (H)         PAST SURGICAL HISTORY     Past Surgical History:   Procedure Laterality Date     ANESTHESIA CARDIOVERSION N/A 3/7/2018    Procedure: ANESTHESIA CARDIOVERSION;  Cardioversion;  Surgeon: GENERIC ANESTHESIA PROVIDER;  Location: [facility]     BENCH LIVER N/A 5/10/2016    Procedure: BENCH LIVER;  Surgeon: Ricky Deshpande MD;  [facility]     BIOPSY LYMPH NODE CERVICAL Right 1/26/2017    Procedure: BIOPSY LYMPH NODE CERVICAL;  Surgeon: Beka Soto MD;  [facility]     CARDIAC SURGERY  [birth year]10[birth year]     CREATE FISTULA ARTERIOVENOUS UPPER EXTREMITY Right 9/16/2014    Procedure: CREATE FISTULA ARTERIOVENOUS UPPER EXTREMITY;  Surgeon: Padmaja Eaton MD;  [facility]     CYSTOSCOPY, REMOVE STENT(S), COMBINED Right 6/22/2016    Procedure: COMBINED CYSTOSCOPY, REMOVE STENT(S);  Surgeon: Ricky Deshpande MD;  [facility]     EMBOLECTOMY UPPER EXTREMITY Right 8/17/2018    Procedure: EMBOLECTOMY UPPER EXTREMITY;  Repair Right Upper Arm Pseudo Aneursym ;  Surgeon: John Payton MD;  [facility]     ENT SURGERY       ESOPHAGOSCOPY, GASTROSCOPY, DUODENOSCOPY (EGD), COMBINED  5/30/2014    Procedure: COMBINED ESOPHAGOSCOPY, GASTROSCOPY, DUODENOSCOPY (EGD);  Surgeon: Guillaume Bautista MD;  Location:  GI     ESOPHAGOSCOPY, GASTROSCOPY, DUODENOSCOPY (EGD), COMBINED  9/30/14     ESOPHAGOSCOPY,  GASTROSCOPY, DUODENOSCOPY (EGD), COMBINED Left 3/12/2015    Procedure: COMBINED ESOPHAGOSCOPY, GASTROSCOPY, DUODENOSCOPY (EGD);  Surgeon: Laureen Galvan MD;  [facility]     ESOPHAGOSCOPY, GASTROSCOPY, DUODENOSCOPY (EGD), COMBINED N/A 2016    Procedure: COMBINED ESOPHAGOSCOPY, GASTROSCOPY, DUODENOSCOPY (EGD);  Surgeon: Laureen Galvan MD;  [facility]     ESOPHAGOSCOPY, GASTROSCOPY, DUODENOSCOPY (EGD), COMBINED N/A 2016    Procedure: COMBINED ESOPHAGOSCOPY, GASTROSCOPY, DUODENOSCOPY (EGD);  Surgeon: Laureen Galvan MD;  [facility]: [facility]     HC OR CATH ABLATION NON-CARDIAC ENDOVASCULAR      SVT     INSERT PORT VASCULAR ACCESS N/A 4/3/2017    Procedure: INSERT PORT VASCULAR ACCESS;  Surgeon: Rajendra Jacques PA-C;  [facility]     LIGATE FISTULA ARTERIOVENOUS UPPER EXTREMITY Right 2017    Procedure: LIGATE FISTULA ARTERIOVENOUS UPPER EXTREMITY;  Revision of Right Arm Arteriovenous Fistula ;  Surgeon: Padmaja Eaton MD;  [facility]     PERCUTANEOUS BIOPSY KIDNEY Right 2018    Procedure: PERCUTANEOUS BIOPSY KIDNEY;  Right Kidney Biopsy;  Surgeon: Yuval Khan MD;  [facility]     PICC INSERTION  14    PICC line placement 14; Removal 2014     THORACIC SURGERY  1980    Transposition great arteries, repaired at 8 months     THYROIDECTOMY Right 2017    Procedure: THYROIDECTOMY;  Bilateral Total Thyroidectomy ;  Surgeon: Beka Soto MD;  [facility]     TRANSPLANT KIDNEY RECIPIENT  DONOR  5/10/2016    Procedure: TRANSPLANT KIDNEY RECIPIENT  DONOR;  Surgeon: Ricky Deshpande MD;  [facility]     TRANSPLANT LIVER RECIPIENT  DONOR N/A 5/10/2016    Procedure: TRANSPLANT LIVER RECIPIENT  DONOR;  Surgeon: Ricky Deshpande MD;  [facility]         CURRENT OUTPATIENT MEDICATIONS     Current Outpatient Medications   Medication Sig     amLODIPine (NORVASC) 10 MG  tablet TAKE ONE TABLET BY MOUTH EVERY DAY     apixaban ANTICOAGULANT (ELIQUIS) 5 MG tablet Take 1 tablet (5 mg) by mouth 2 times daily     desonide (DESOWEN) 0.05 % ointment Apply topically 2 times daily     ketoconazole (NIZORAL) 2 % cream Twice daily to areas of rash on face     ketoconazole (NIZORAL) 2 % shampoo Use as a foaming face wash in shower daily     levothyroxine (SYNTHROID/LEVOTHROID) 137 MCG tablet Take 1 tablet by mouth Monday - Saturday  and 1.5 tablets on Sunday     lisinopril (PRINIVIL/ZESTRIL) 5 MG tablet TAKE ONE TABLET BY MOUTH EVERY DAY     magnesium oxide (MAG-OX) 400 MG tablet TAKE ONE TABLET BY MOUTH TWICE A DAY     metoprolol succinate (TOPROL-XL) 25 MG 24 hr tablet Take 1 tablet (25 mg) by mouth daily     mycophenolate (GENERIC EQUIVALENT) 250 MG capsule TAKE ONE CAPSULE BY MOUTH TWICE A DAY     mycophenolate (GENERIC EQUIVALENT) 250 MG capsule Take 1 capsule (250 mg) by mouth 2 times daily     oxyCODONE-acetaminophen (PERCOCET) 5-325 MG per tablet Take 1 tablet by mouth every 4 hours as needed for pain (moderate to severe)     predniSONE (DELTASONE) 5 MG tablet Take 2.5 mg once daily for 14 days, then stop     sulfamethoxazole-trimethoprim (BACTRIM/SEPTRA) 400-80 MG per tablet Take 1 tablet by mouth daily     tacrolimus (GENERIC EQUIVALENT) 0.5 MG capsule TAKE ONE CAPSULE BY MOUTH TWICE A DAY. (TOTAL DAILY DOSE IS 1.5MG TWO TIMES A DAY).     tacrolimus (GENERIC EQUIVALENT) 1 MG capsule 1.5 mg every 12 hours     Current Facility-Administered Medications   Medication     heparin 100 UNIT/ML injection 5 mL     sodium chloride (PF) 0.9% PF flush 20 mL        ALLERGIES     Allergies   Allergen Reactions     Hydromorphone Itching     .     SOCIAL HISTORY     Social History     Socioeconomic History     Marital status: Single     Spouse name: Not on file     Number of children: 0     Years of education: Not on file     Highest education level: Not on file   Social Needs     Financial resource  strain: Not on file     Food insecurity - worry: Not on file     Food insecurity - inability: Not on file     Transportation needs - medical: Not on file     Transportation needs - non-medical: Not on file   Occupational History     Employer: UNEMPLOYED   Tobacco Use     Smoking status: Former Smoker     Packs/day: 0.33     Years: 3.00     Pack years: 0.99     Types: Cigarettes     Start date: 1997     Last attempt to quit: 2000     Years since quittin.3     Smokeless tobacco: Former User   Substance and Sexual Activity     Alcohol use: No     Alcohol/week: 0.0 oz     Comment: ~10 drinks per day for ten years, quit in 2014     Drug use: No     Sexual activity: Not Currently     Partners: Female     Birth control/protection: None   Other Topics Concern     Parent/sibling w/ CABG, MI or angioplasty before 65F 55M? Not Asked   Social History Narrative    ? Blood transfusion as baby during surgery,    Professional performed tattoos     No Illicit IV or intranasal drug use.           FAMILY HISTORY     Family History   Problem Relation Age of Onset     Hypertension Brother      Hypertension Sister      Arthritis Father      Hypertension Father      Hypertension Mother      Hypertension Sister      Alcohol/Drug No family hx of      Gastrointestinal Disease No family hx of         no fam hx of liver disease or liver cancer          REVIEW OF SYSTEMS   Pertinent positives have been included in HPI;  Review Of Systems  General: per HPI  Skin: negative for rash  Eyes: negative for visual blurring  Ears/Nose/Throat: negative for hearing loss  Respiratory: No shortness of breath, dyspnea on exertion, cough, or hemoptysis  Cardiovascular: negative for palpitations and tachycardia  Gastrointestinal: negative for nausea, vomiting and abdominal pain  Genitourinary: negative for nocturia and dysuria  Musculoskeletal: negative for muscular pain or bone pain  Neurologic: negative for migraine  "headaches  Psychiatric: negative for anxiety  Hematologic/Lymphatic/Immunologic: as per hpi  Endocrine: as per hpi       PHYSICAL EXAM   /80 (BP Location: Left arm, Patient Position: Sitting, Cuff Size: Adult Regular)   Pulse 83   Temp 97.8  F (36.6  C) (Oral)   Resp 18   Ht 1.778 m (5' 10\")   Wt 92.1 kg (203 lb)   SpO2 98%   BMI 29.13 kg/m    General appearance: pleasant man not in acute distress  Eyes, Ears, Nose, Throat & Mouth:pupils equal and reactive to light and accommodation, extraocular movements intact, no icterus, injection or pallor. Oropharynx is clear.  Neck: supple, see hem  Respiratory: clear to auscultation bilaterally  Cardiovascular: regular, no murmurs, rubs, or gallops  Thorax: PORT in place  Gastrointenstinal: soft, non-tender, non-distended, normal bowel sounds, no hepatosplenomegaly  Extremities: warm, well perfused, no edema  Skin: no rash  Hematologic/Lymph: no cervical or inguinal lymphadenopathy, 1x1cm mobile right axillary lymph node     LABORATORY AND IMAGING STUDIES     Recent Labs   Lab Test  08/07/18   1435  08/07/18   1116  07/25/18   1721   04/17/18   1129   WBC  6.5  5.3  6.1   < >  4.0   RBC  5.08  4.98  4.94   < >  4.94   HGB  15.2  15.5  15.0   < >  14.9   HCT  46.0  45.1  45.2   < >  43.5   MCV  91  91  92   < >  88   MCH  29.9  31.1  30.4   < >  30.2   MCHC  33.0  34.4  33.2   < >  34.3   RDW  12.8  13.6  12.8   < >  13.7   PLT  135*  131*  127*   < >  130*   NEUTROPHIL  81.5   --   85.3   --   78.3   ANEU  5.3   --   5.2   --   3.2   ALYM  0.6*   --   0.5*   --   0.5*   JANE  0.5   --   0.4   --   0.3   AEOS  0.0   --   0.0   --   0.1    < > = values in this interval not displayed.     Recent Labs   Lab Test  08/07/18   1435  08/07/18   1116  07/25/18   1721   NA  138  139  136   POTASSIUM  4.1  4.0  4.2   CHLORIDE  106  106  104   CO2  26  29  25   ANIONGAP  6  4  7   GLC  91  91  106*   BUN  13  14  18   CR  1.20  1.39*  1.39*   COLBY  9.1  8.9  9.0     Recent " Labs   Lab Test  08/07/18   1435  07/25/18   1721  06/12/18   1123   BILITOTAL  0.8  0.5  0.5   ALKPHOS  70  71  77   AST  22  16  17   ALT  30  22  23     Recent Labs   Lab Test  08/07/18   1435  07/25/18   1721  04/17/18   1129   LDH  151  166  151     Recent Labs   Lab Test 01/08/19  1125 09/18/18  1114 01/02/18  1225 02/01/17  1104    [phone]   IGM  --   --   --  82   IGA  --   --   --  150     Recent Labs   Lab Test 02/01/17  1104   ELPM 0.0   ELPINT Essentially normal electrophoretic pattern.  No monoclonal protein seen.   Pathologic significance requires clinical correlation.  PATRICIA Moody M.D.,   Ph.D., Pathologist ().         ECOG PS: 0   ASSESSMENT AND RECOMMENDATIONS     Mr. Chen is a 37yo man with past medical history of combined liver/kidney transplant in 05/2016 complicated by EBV+ monomorphic PTLD stage III on 02/2017 with non-GCB DLBCL s/p  RSST protocol with 4 weekly infusions of rituximab followed by R-COEP x4.  He is here for his follow-up appointment. He is doing well. Imaging review consistent with CR on 1/8/2019. He is on immunosuppression with tacrolimus and mycophenolate. He is following with transplant team.  We do recommend to taper down mycophenolate if that is feasible from a transplant standpoint.  If not, we would advise to keep him on the lowest effective dose to mitigate the risk for PTLD recurrence. He will come back in clinic in 6 months with labs and surveillance CT scan. Will place an IR consult to remove the PORT placement.    Carter Prather MD   of Medicine  Division of Hematology, Oncology and Transplantation  [facility]

## 2019-01-15 NOTE — NURSING NOTE
Chief Complaint   Patient presents with     Port Draw     Right port accessed with a gripper needle, labs drawn and sent, flushed with saline and heparin, deaccessed, vitals completed, checked into next appointment.   Desiree MillanRN

## 2019-01-15 NOTE — NURSING NOTE
"Oncology Rooming Note    January 15, 2019 11:40 AM   Cricket Chen is a 38 year old male who presents for:    Chief Complaint   Patient presents with     Port Draw     Right port accessed with a gripper needle, labs drawn and sent, flushed with saline and heparin, deaccessed, vitals completed, checked into next appointment.     Oncology Clinic Visit     PTLD     Initial Vitals: /80 (BP Location: Left arm, Patient Position: Sitting, Cuff Size: Adult Regular)   Pulse 83   Temp 97.8  F (36.6  C) (Oral)   Resp 18   Ht 1.778 m (5' 10\")   Wt 92.1 kg (203 lb)   SpO2 98%   BMI 29.13 kg/m   Estimated body mass index is 29.13 kg/m  as calculated from the following:    Height as of this encounter: 1.778 m (5' 10\").    Weight as of this encounter: 92.1 kg (203 lb). Body surface area is 2.13 meters squared.  No Pain (0) Comment: Data Unavailable   No LMP for male patient.  Allergies reviewed: Yes  Medications reviewed: Yes    Medications: Medication refills not needed today.  Pharmacy name entered into [facility]:    [facility] - [place], [place] - [address]  [facility] - [place], [place] - [address] [facility]    Clinical concerns: No New Concerns    5 minutes for nursing intake (face to face time)     VISH Steiner      "

## 2019-01-16 ENCOUNTER — APPOINTMENT (OUTPATIENT)
Dept: GENERAL RADIOLOGY | Facility: CLINIC | Age: 39
End: 2019-01-16
Payer: COMMERCIAL

## 2019-01-16 ENCOUNTER — HOSPITAL ENCOUNTER (OUTPATIENT)
Facility: CLINIC | Age: 39
Setting detail: OBSERVATION
Discharge: HOME | End: 2019-01-17
Attending: INTERNAL MEDICINE | Admitting: EMERGENCY MEDICINE
Payer: COMMERCIAL

## 2019-01-16 ENCOUNTER — CARE COORDINATION (OUTPATIENT)
Dept: CARDIOLOGY | Facility: CLINIC | Age: 39
End: 2019-01-16

## 2019-01-16 DIAGNOSIS — Z94.0 STATUS POST KIDNEY TRANSPLANT: ICD-10-CM

## 2019-01-16 DIAGNOSIS — R07.9 CHEST PAIN, UNSPECIFIED TYPE: ICD-10-CM

## 2019-01-16 DIAGNOSIS — Z94.4 STATUS POST LIVER TRANSPLANTATION (H): ICD-10-CM

## 2019-01-16 DIAGNOSIS — R07.89 OTHER CHEST PAIN: Primary | ICD-10-CM

## 2019-01-16 DIAGNOSIS — Q24.9 CONGENITAL HEART ANOMALY: ICD-10-CM

## 2019-01-16 LAB
ALBUMIN SERPL-MCNC: 4.3 G/DL (ref 3.4–5)
ALP SERPL-CCNC: 89 U/L (ref 40–150)
ALT SERPL W P-5'-P-CCNC: 24 U/L (ref 0–70)
ANION GAP SERPL CALCULATED.3IONS-SCNC: 9 MMOL/L (ref 3–14)
AST SERPL W P-5'-P-CCNC: 17 U/L (ref 0–45)
BASOPHILS # BLD AUTO: 0 10E9/L (ref 0–0.2)
BASOPHILS NFR BLD AUTO: 0.3 %
BILIRUB SERPL-MCNC: 0.6 MG/DL (ref 0.2–1.3)
BUN SERPL-MCNC: 14 MG/DL (ref 7–30)
CALCIUM SERPL-MCNC: 9.1 MG/DL (ref 8.5–10.1)
CHLORIDE SERPL-SCNC: 105 MMOL/L (ref 94–109)
CO2 SERPL-SCNC: 25 MMOL/L (ref 20–32)
CREAT SERPL-MCNC: 1.39 MG/DL (ref 0.66–1.25)
DIFFERENTIAL METHOD BLD: NORMAL
EOSINOPHIL # BLD AUTO: 0.1 10E9/L (ref 0–0.7)
EOSINOPHIL NFR BLD AUTO: 0.8 %
ERYTHROCYTE [DISTWIDTH] IN BLOOD BY AUTOMATED COUNT: 12.4 % (ref 10–15)
GFR SERPL CREATININE-BSD FRML MDRD: 64 ML/MIN/{1.73_M2}
GLUCOSE SERPL-MCNC: 88 MG/DL (ref 70–99)
HCT VFR BLD AUTO: 43.3 % (ref 40–53)
HGB BLD-MCNC: 15.2 G/DL (ref 13.3–17.7)
IMM GRANULOCYTES # BLD: 0 10E9/L (ref 0–0.4)
IMM GRANULOCYTES NFR BLD: 0 %
LYMPHOCYTES # BLD AUTO: 1.2 10E9/L (ref 0.8–5.3)
LYMPHOCYTES NFR BLD AUTO: 17.5 %
MCH RBC QN AUTO: 30.7 PG (ref 26.5–33)
MCHC RBC AUTO-ENTMCNC: 35.1 G/DL (ref 31.5–36.5)
MCV RBC AUTO: 88 FL (ref 78–100)
MONOCYTES # BLD AUTO: 0.5 10E9/L (ref 0–1.3)
MONOCYTES NFR BLD AUTO: 8.2 %
NEUTROPHILS # BLD AUTO: 4.8 10E9/L (ref 1.6–8.3)
NEUTROPHILS NFR BLD AUTO: 73.2 %
NRBC # BLD AUTO: 0 10*3/UL
NRBC BLD AUTO-RTO: 0 /100
NT-PROBNP SERPL-MCNC: 196 PG/ML (ref 0–450)
PLATELET # BLD AUTO: 160 10E9/L (ref 150–450)
POTASSIUM SERPL-SCNC: 3.8 MMOL/L (ref 3.4–5.3)
PROT SERPL-MCNC: 7.4 G/DL (ref 6.8–8.8)
RBC # BLD AUTO: 4.95 10E12/L (ref 4.4–5.9)
SODIUM SERPL-SCNC: 140 MMOL/L (ref 133–144)
TROPONIN I SERPL-MCNC: <0.015 UG/L (ref 0–0.04)
WBC # BLD AUTO: 6.6 10E9/L (ref 4–11)

## 2019-01-16 PROCEDURE — 99285 EMERGENCY DEPT VISIT HI MDM: CPT | Mod: 25 | Performed by: INTERNAL MEDICINE

## 2019-01-16 PROCEDURE — 71045 X-RAY EXAM CHEST 1 VIEW: CPT

## 2019-01-16 PROCEDURE — 80053 COMPREHEN METABOLIC PANEL: CPT | Performed by: INTERNAL MEDICINE

## 2019-01-16 PROCEDURE — 25000132 ZZH RX MED GY IP 250 OP 250 PS 637: Performed by: INTERNAL MEDICINE

## 2019-01-16 PROCEDURE — 93005 ELECTROCARDIOGRAM TRACING: CPT | Performed by: INTERNAL MEDICINE

## 2019-01-16 PROCEDURE — 25000131 ZZH RX MED GY IP 250 OP 636 PS 637: Performed by: INTERNAL MEDICINE

## 2019-01-16 PROCEDURE — 83880 ASSAY OF NATRIURETIC PEPTIDE: CPT | Performed by: INTERNAL MEDICINE

## 2019-01-16 PROCEDURE — 85025 COMPLETE CBC W/AUTO DIFF WBC: CPT | Performed by: INTERNAL MEDICINE

## 2019-01-16 PROCEDURE — 84484 ASSAY OF TROPONIN QUANT: CPT | Performed by: INTERNAL MEDICINE

## 2019-01-16 RX ORDER — MAGNESIUM OXIDE 400 MG/1
400 TABLET ORAL ONCE
Status: COMPLETED | OUTPATIENT
Start: 2019-01-16 | End: 2019-01-16

## 2019-01-16 RX ORDER — MYCOPHENOLATE MOFETIL 250 MG/1
250 CAPSULE ORAL ONCE
Status: COMPLETED | OUTPATIENT
Start: 2019-01-16 | End: 2019-01-16

## 2019-01-16 RX ADMIN — MAGNESIUM OXIDE TAB 400 MG (241.3 MG ELEMENTAL MG) 400 MG: 400 (241.3 MG) TAB at 23:30

## 2019-01-16 RX ADMIN — MYCOPHENOLATE MOFETIL 250 MG: 250 CAPSULE ORAL at 23:30

## 2019-01-16 RX ADMIN — TACROLIMUS 1.5 MG: 1 CAPSULE ORAL at 23:30

## 2019-01-16 RX ADMIN — APIXABAN 5 MG: 5 TABLET, FILM COATED ORAL at 23:30

## 2019-01-16 ASSESSMENT — ENCOUNTER SYMPTOMS
TREMORS: 1
CHILLS: 1
SHORTNESS OF BREATH: 1

## 2019-01-16 ASSESSMENT — MIFFLIN-ST. JEOR: SCORE: 1833.44

## 2019-01-17 ENCOUNTER — APPOINTMENT (OUTPATIENT)
Dept: CARDIOLOGY | Facility: CLINIC | Age: 39
End: 2019-01-17
Payer: COMMERCIAL

## 2019-01-17 VITALS
BODY MASS INDEX: 28.45 KG/M2 | HEART RATE: 63 BPM | WEIGHT: 198.7 LBS | RESPIRATION RATE: 16 BRPM | TEMPERATURE: 98.2 F | OXYGEN SATURATION: 98 % | SYSTOLIC BLOOD PRESSURE: 116 MMHG | DIASTOLIC BLOOD PRESSURE: 72 MMHG | HEIGHT: 70 IN

## 2019-01-17 PROBLEM — R07.9 CHEST PAIN: Status: ACTIVE | Noted: 2019-01-17

## 2019-01-17 LAB
BKV DNA # SPEC NAA+PROBE: 3386 COPIES/ML
BKV DNA SPEC NAA+PROBE-LOG#: 3.5 LOG COPIES/ML
INTERPRETATION ECG - MUSE: NORMAL
SPECIMEN SOURCE: ABNORMAL
T4 FREE SERPL-MCNC: 1.33 NG/DL (ref 0.76–1.46)
TROPONIN I SERPL-MCNC: <0.015 UG/L (ref 0–0.04)
TROPONIN I SERPL-MCNC: <0.015 UG/L (ref 0–0.04)

## 2019-01-17 PROCEDURE — 99215 OFFICE O/P EST HI 40 MIN: CPT | Mod: GC | Performed by: INTERNAL MEDICINE

## 2019-01-17 PROCEDURE — 93325 DOPPLER ECHO COLOR FLOW MAPG: CPT

## 2019-01-17 PROCEDURE — G0378 HOSPITAL OBSERVATION PER HR: HCPCS

## 2019-01-17 PROCEDURE — 36592 COLLECT BLOOD FROM PICC: CPT | Performed by: NURSE PRACTITIONER

## 2019-01-17 PROCEDURE — 84484 ASSAY OF TROPONIN QUANT: CPT | Performed by: NURSE PRACTITIONER

## 2019-01-17 PROCEDURE — 0298T ZZC EXT ECG > 48HR TO 21 DAY REVIEW AND INTERPRETATN: CPT | Mod: ZP | Performed by: INTERNAL MEDICINE

## 2019-01-17 PROCEDURE — 99234 HOSP IP/OBS SM DT SF/LOW 45: CPT | Mod: Z6 | Performed by: NURSE PRACTITIONER

## 2019-01-17 PROCEDURE — 84439 ASSAY OF FREE THYROXINE: CPT | Performed by: NURSE PRACTITIONER

## 2019-01-17 PROCEDURE — 25000132 ZZH RX MED GY IP 250 OP 250 PS 637: Performed by: INTERNAL MEDICINE

## 2019-01-17 PROCEDURE — 25000131 ZZH RX MED GY IP 250 OP 636 PS 637: Performed by: NURSE PRACTITIONER

## 2019-01-17 PROCEDURE — 25000128 H RX IP 250 OP 636: Performed by: NURSE PRACTITIONER

## 2019-01-17 PROCEDURE — 0296T ZIO PATCH HOLTER ADULT PEDIATRIC GREATER THAN 48 HRS: CPT

## 2019-01-17 PROCEDURE — 25000132 ZZH RX MED GY IP 250 OP 250 PS 637: Performed by: NURSE PRACTITIONER

## 2019-01-17 RX ORDER — ONDANSETRON 4 MG/1
4 TABLET, ORALLY DISINTEGRATING ORAL ONCE
Status: DISCONTINUED | OUTPATIENT
Start: 2019-01-17 | End: 2019-01-17 | Stop reason: HOSPADM

## 2019-01-17 RX ORDER — HEPARIN SODIUM (PORCINE) LOCK FLUSH IV SOLN 100 UNIT/ML 100 UNIT/ML
5 SOLUTION INTRAVENOUS
Status: DISCONTINUED | OUTPATIENT
Start: 2019-01-17 | End: 2019-01-17 | Stop reason: HOSPADM

## 2019-01-17 RX ORDER — HYDROXYZINE HYDROCHLORIDE 25 MG/1
25 TABLET, FILM COATED ORAL ONCE
Status: COMPLETED | OUTPATIENT
Start: 2019-01-17 | End: 2019-01-17

## 2019-01-17 RX ORDER — LEVOTHYROXINE SODIUM 137 UG/1
137 TABLET ORAL
Status: DISCONTINUED | OUTPATIENT
Start: 2019-01-17 | End: 2019-01-17 | Stop reason: HOSPADM

## 2019-01-17 RX ORDER — AMLODIPINE BESYLATE 5 MG/1
10 TABLET ORAL DAILY
Status: DISCONTINUED | OUTPATIENT
Start: 2019-01-17 | End: 2019-01-17 | Stop reason: HOSPADM

## 2019-01-17 RX ORDER — ONDANSETRON 2 MG/ML
4 INJECTION INTRAMUSCULAR; INTRAVENOUS
Status: DISCONTINUED | OUTPATIENT
Start: 2019-01-17 | End: 2019-01-17 | Stop reason: HOSPADM

## 2019-01-17 RX ORDER — ALUMINA, MAGNESIA, AND SIMETHICONE 2400; 2400; 240 MG/30ML; MG/30ML; MG/30ML
15 SUSPENSION ORAL ONCE
Status: DISCONTINUED | OUTPATIENT
Start: 2019-01-17 | End: 2019-01-17 | Stop reason: HOSPADM

## 2019-01-17 RX ORDER — SULFAMETHOXAZOLE AND TRIMETHOPRIM 400; 80 MG/1; MG/1
1 TABLET ORAL DAILY
Status: DISCONTINUED | OUTPATIENT
Start: 2019-01-17 | End: 2019-01-17 | Stop reason: HOSPADM

## 2019-01-17 RX ORDER — HEPARIN SODIUM,PORCINE 10 UNIT/ML
5-10 VIAL (ML) INTRAVENOUS EVERY 24 HOURS
Status: DISCONTINUED | OUTPATIENT
Start: 2019-01-17 | End: 2019-01-17 | Stop reason: HOSPADM

## 2019-01-17 RX ORDER — LORAZEPAM 0.5 MG/1
0.5 TABLET ORAL EVERY 8 HOURS PRN
Qty: 10 TABLET | Refills: 0 | Status: SHIPPED | OUTPATIENT
Start: 2019-01-17 | End: 2019-01-21

## 2019-01-17 RX ORDER — MYCOPHENOLATE MOFETIL 250 MG/1
250 CAPSULE ORAL 2 TIMES DAILY
Status: DISCONTINUED | OUTPATIENT
Start: 2019-01-17 | End: 2019-01-17 | Stop reason: HOSPADM

## 2019-01-17 RX ORDER — LISINOPRIL 5 MG/1
5 TABLET ORAL DAILY
Status: DISCONTINUED | OUTPATIENT
Start: 2019-01-17 | End: 2019-01-17 | Stop reason: HOSPADM

## 2019-01-17 RX ORDER — HYDROXYZINE HYDROCHLORIDE 25 MG/1
25 TABLET, FILM COATED ORAL 3 TIMES DAILY PRN
Status: DISCONTINUED | OUTPATIENT
Start: 2019-01-17 | End: 2019-01-17 | Stop reason: HOSPADM

## 2019-01-17 RX ORDER — HEPARIN SODIUM,PORCINE 10 UNIT/ML
5-10 VIAL (ML) INTRAVENOUS
Status: DISCONTINUED | OUTPATIENT
Start: 2019-01-17 | End: 2019-01-17 | Stop reason: HOSPADM

## 2019-01-17 RX ORDER — MAGNESIUM OXIDE 400 MG/1
400 TABLET ORAL 2 TIMES DAILY
Status: DISCONTINUED | OUTPATIENT
Start: 2019-01-17 | End: 2019-01-17 | Stop reason: HOSPADM

## 2019-01-17 RX ADMIN — LEVOTHYROXINE SODIUM 137 MCG: 137 TABLET ORAL at 07:56

## 2019-01-17 RX ADMIN — MYCOPHENOLATE MOFETIL 250 MG: 250 CAPSULE ORAL at 07:56

## 2019-01-17 RX ADMIN — LISINOPRIL 5 MG: 5 TABLET ORAL at 07:56

## 2019-01-17 RX ADMIN — HEPARIN 5 ML: 100 SYRINGE at 17:41

## 2019-01-17 RX ADMIN — SULFAMETHOXAZOLE AND TRIMETHOPRIM 1 TABLET: 400; 80 TABLET ORAL at 09:57

## 2019-01-17 RX ADMIN — APIXABAN 5 MG: 5 TABLET, FILM COATED ORAL at 07:56

## 2019-01-17 RX ADMIN — TACROLIMUS 1.5 MG: 1 CAPSULE ORAL at 07:56

## 2019-01-17 RX ADMIN — MAGNESIUM OXIDE TAB 400 MG (241.3 MG ELEMENTAL MG) 400 MG: 400 (241.3 MG) TAB at 07:56

## 2019-01-17 RX ADMIN — AMLODIPINE BESYLATE 10 MG: 5 TABLET ORAL at 07:56

## 2019-01-17 RX ADMIN — HYDROXYZINE HYDROCHLORIDE 25 MG: 25 TABLET ORAL at 00:51

## 2019-01-17 ASSESSMENT — MIFFLIN-ST. JEOR: SCORE: 1827.55

## 2019-01-17 NOTE — ED PROVIDER NOTES
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 Patient reports chest pain and shortness of breath with mild exertion -- walking up stairs, etc. He also complains of chills and tremors for the past week that feel similar to a panic attack. He does not feel his symptoms are due to Atrial Flutter. Patient denies family history of early cardiac disease.    Past Medical History:   Diagnosis Date     Alcohol abuse     Last drink in Mid-April 2014     Anemia in ESRD (end-stage renal disease) (H)      Anxiety 2008     Atrial flutter (H)      Cirrhosis (H)     S/P liver transplant     Depression      History of blood transfusion      History of transposition of great vessels     atrial switch at age 8 months old     Hypertension      Liver transplant recipient (H)     2016     Papillary thyroid carcinoma (H)      Pneumonia 11-15-14     Renal transplant recipient     2016     Varices, esophageal (H)        Past Surgical History:   Procedure Laterality Date     ANESTHESIA CARDIOVERSION N/A 3/7/2018    Procedure: ANESTHESIA CARDIOVERSION;  Cardioversion;  Surgeon: GENERIC ANESTHESIA PROVIDER;  Location:  OR     BENCH LIVER N/A 5/10/2016    Procedure: BENCH LIVER;  Surgeon: Ricky Deshpande MD;  [facility]     BIOPSY LYMPH NODE CERVICAL Right 1/26/2017    Procedure: BIOPSY LYMPH NODE CERVICAL;  Surgeon: Beka Soto MD;  [facility]     CARDIAC SURGERY  9-10-80     CREATE FISTULA ARTERIOVENOUS UPPER EXTREMITY Right 9/16/2014    Procedure: CREATE FISTULA ARTERIOVENOUS UPPER EXTREMITY;  Surgeon: Padmaja Eaton MD;  [facility]     CYSTOSCOPY, REMOVE  STENT(S), COMBINED Right 6/22/2016    Procedure: COMBINED CYSTOSCOPY, REMOVE STENT(S);  Surgeon: Ricky Deshpande MD;  [facility]     EMBOLECTOMY UPPER EXTREMITY Right 8/17/2018    Procedure: EMBOLECTOMY UPPER EXTREMITY;  Repair Right Upper Arm Pseudo Aneursym ;  Surgeon: John Payton MD;  [facility]     ENT SURGERY       ESOPHAGOSCOPY, GASTROSCOPY, DUODENOSCOPY (EGD), COMBINED  5/30/2014    Procedure: COMBINED ESOPHAGOSCOPY, GASTROSCOPY, DUODENOSCOPY (EGD);  Surgeon: Guillaume Bautista MD;  [facility]     ESOPHAGOSCOPY, GASTROSCOPY, DUODENOSCOPY (EGD), COMBINED  9/30/14     ESOPHAGOSCOPY, GASTROSCOPY, DUODENOSCOPY (EGD), COMBINED Left 3/12/2015    Procedure: COMBINED ESOPHAGOSCOPY, GASTROSCOPY, DUODENOSCOPY (EGD);  Surgeon: Laureen Galvan MD;  [facility]     ESOPHAGOSCOPY, GASTROSCOPY, DUODENOSCOPY (EGD), COMBINED N/A 4/21/2016    Procedure: COMBINED ESOPHAGOSCOPY, GASTROSCOPY, DUODENOSCOPY (EGD);  Surgeon: Laureen Galvan MD;  [facility]     ESOPHAGOSCOPY, GASTROSCOPY, DUODENOSCOPY (EGD), COMBINED N/A 7/21/2016    Procedure: COMBINED ESOPHAGOSCOPY, GASTROSCOPY, DUODENOSCOPY (EGD);  Surgeon: Laureen Galvan MD;  [facility]     HC OR CATH ABLATION NON-CARDIAC ENDOVASCULAR  1990,2002    SVT     INSERT PORT VASCULAR ACCESS N/A 4/3/2017    Procedure: INSERT PORT VASCULAR ACCESS;  Surgeon: Rajendra Jacques PA-C;  [facility]     LIGATE FISTULA ARTERIOVENOUS UPPER EXTREMITY Right 11/7/2017    Procedure: LIGATE FISTULA ARTERIOVENOUS UPPER EXTREMITY;  Revision of Right Arm Arteriovenous Fistula ;  Surgeon: Padmaja Eaton MD;  [facility]     PERCUTANEOUS BIOPSY KIDNEY Right 9/26/2018    Procedure: PERCUTANEOUS BIOPSY KIDNEY;  Right Kidney Biopsy;  Surgeon: Yuval Khan MD;  [facility]     PICC INSERTION  5/30/14    PICC line placement 5/30/14; Removal 6/9/2014     THORACIC SURGERY  1980    Transposition great  arteries, repaired at 8 months     THYROIDECTOMY Right 2017    Procedure: THYROIDECTOMY;  Bilateral Total Thyroidectomy ;  Surgeon: Beka Soto MD;  [facility]     TRANSPLANT KIDNEY RECIPIENT  DONOR  5/10/2016    Procedure: TRANSPLANT KIDNEY RECIPIENT  DONOR;  Surgeon: Ricky Deshpande MD;  [facility]     TRANSPLANT LIVER RECIPIENT  DONOR N/A 5/10/2016    Procedure: TRANSPLANT LIVER RECIPIENT  DONOR;  Surgeon: Ricky Deshpande MD;  [facility]       Family History   Problem Relation Age of Onset     Hypertension Brother      Hypertension Sister      Arthritis Father      Hypertension Father      Hypertension Mother      Hypertension Sister      Alcohol/Drug No family hx of      Gastrointestinal Disease No family hx of         no fam hx of liver disease or liver cancer       Social History     Tobacco Use     Smoking status: Former Smoker     Packs/day: 0.33     Years: 3.00     Pack years: 0.99     Types: Cigarettes     Start date: 1997     Last attempt to quit: 2000     Years since quittin.3     Smokeless tobacco: Former User   Substance Use Topics     Alcohol use: No     Alcohol/week: 0.0 oz     Comment: ~10 drinks per day for ten years, quit in 2014       Current Facility-Administered Medications   Medication     hydrOXYzine (ATARAX) tablet 25 mg     Current Outpatient Medications   Medication     amLODIPine (NORVASC) 10 MG tablet     apixaban ANTICOAGULANT (ELIQUIS) 5 MG tablet     desonide (DESOWEN) 0.05 % ointment     ketoconazole (NIZORAL) 2 % cream     ketoconazole (NIZORAL) 2 % shampoo     levothyroxine (SYNTHROID/LEVOTHROID) 137 MCG tablet     lisinopril (PRINIVIL/ZESTRIL) 5 MG tablet     magnesium oxide (MAG-OX) 400 MG tablet     metoprolol succinate (TOPROL-XL) 25 MG 24 hr tablet     mycophenolate (GENERIC EQUIVALENT) 250 MG capsule     sulfamethoxazole-trimethoprim (BACTRIM/SEPTRA) 400-80 MG per tablet     tacrolimus  "(GENERIC EQUIVALENT) 1 MG capsule        Allergies   Allergen Reactions     Hydromorphone Itching       I have reviewed the Medications, Allergies, Past Medical and Surgical History, and Social History in the Epic system.    Review of Systems   Constitutional: Positive for chills.   Respiratory: Positive for shortness of breath.    Cardiovascular: Positive for chest pain.   Neurological: Positive for tremors.   All other systems reviewed and are negative.      Physical Exam   BP: 140/87  Pulse: 70  Heart Rate: 83  Temp: 98.2  F (36.8  C)  Resp: 16  Height: 177.8 cm (5' 10\")  Weight: 90.7 kg (200 lb)  SpO2: 99 %      Physical Exam   Constitutional: No distress.   HENT:   Head: Atraumatic.   Mouth/Throat: Oropharynx is clear and moist.   Eyes: Pupils are equal, round, and reactive to light. No scleral icterus.   Neck: Neck supple. No tracheal deviation present.   Cardiovascular: Normal rate, regular rhythm, normal heart sounds and intact distal pulses. Exam reveals no gallop and no friction rub.   No murmur heard.  Pulmonary/Chest: Effort normal and breath sounds normal. No stridor. No respiratory distress. He has no wheezes. He has no rales. He exhibits no tenderness.   Abdominal: Soft. Bowel sounds are normal. He exhibits no distension and no mass. There is no tenderness. There is no rebound and no guarding. No hernia.   Musculoskeletal: He exhibits no edema or tenderness.   Neurological: He is alert. No cranial nerve deficit.   Skin: Skin is warm. No rash noted. He is not diaphoretic.       ED Course        Procedures       10:00 PM  The patient was seen and examined by Dr. Faria in [facility]          EKG Interpretation:      Interpreted by Ching Faria M.D.  Time reviewed: 19:29  Symptoms at time of EKG: Chest pain and shortness of breath   Rhythm: normal sinus   Rate: Normal, 77 bpm  Axis: Normal  Ectopy: none  Conduction: right bundle branch block, plus right ventricular hypertrophy  ST Segments/ T Waves: No " acute ischemic changes  Q Waves: none  Comparison to prior: Unchanged from 7/2018    Clinical Impression: no acute changes    Results for orders placed or performed during the hospital encounter of 01/16/19 (from the past 24 hour(s))   EKG 12-lead, tracing only     Status: None (Preliminary result)    Collection Time: 01/16/19  7:29 PM   Result Value Ref Range    Interpretation ECG Click View Image link to view waveform and result    XR Chest Port 1 View     Status: None (Preliminary result)    Collection Time: 01/16/19 10:15 PM    Narrative    This is a preliminary resident report. Full report will follow.      Impression    Impression:   No acute cardiopulmonary abnormalities.   CBC with platelets differential     Status: None    Collection Time: 01/16/19 10:35 PM   Result Value Ref Range    WBC 6.6 4.0 - 11.0 10e9/L    RBC Count 4.95 4.4 - 5.9 10e12/L    Hemoglobin 15.2 13.3 - 17.7 g/dL    Hematocrit 43.3 40.0 - 53.0 %    MCV 88 78 - 100 fl    MCH 30.7 26.5 - 33.0 pg    MCHC 35.1 31.5 - 36.5 g/dL    RDW 12.4 10.0 - 15.0 %    Platelet Count 160 150 - 450 10e9/L    Diff Method Automated Method     % Neutrophils 73.2 %    % Lymphocytes 17.5 %    % Monocytes 8.2 %    % Eosinophils 0.8 %    % Basophils 0.3 %    % Immature Granulocytes 0.0 %    Nucleated RBCs 0 0 /100    Absolute Neutrophil 4.8 1.6 - 8.3 10e9/L    Absolute Lymphocytes 1.2 0.8 - 5.3 10e9/L    Absolute Monocytes 0.5 0.0 - 1.3 10e9/L    Absolute Eosinophils 0.1 0.0 - 0.7 10e9/L    Absolute Basophils 0.0 0.0 - 0.2 10e9/L    Abs Immature Granulocytes 0.0 0 - 0.4 10e9/L    Absolute Nucleated RBC 0.0    Comprehensive metabolic panel     Status: Abnormal    Collection Time: 01/16/19 10:35 PM   Result Value Ref Range    Sodium 140 133 - 144 mmol/L    Potassium 3.8 3.4 - 5.3 mmol/L    Chloride 105 94 - 109 mmol/L    Carbon Dioxide 25 20 - 32 mmol/L    Anion Gap 9 3 - 14 mmol/L    Glucose 88 70 - 99 mg/dL    Urea Nitrogen 14 7 - 30 mg/dL    Creatinine 1.39 (H)  0.66 - 1.25 mg/dL    GFR Estimate 64 >60 mL/min/[1.73_m2]    GFR Estimate If Black 74 >60 mL/min/[1.73_m2]    Calcium 9.1 8.5 - 10.1 mg/dL    Bilirubin Total 0.6 0.2 - 1.3 mg/dL    Albumin 4.3 3.4 - 5.0 g/dL    Protein Total 7.4 6.8 - 8.8 g/dL    Alkaline Phosphatase 89 40 - 150 U/L    ALT 24 0 - 70 U/L    AST 17 0 - 45 U/L   Troponin I     Status: None    Collection Time: 01/16/19 10:35 PM   Result Value Ref Range    Troponin I ES <0.015 0.000 - 0.045 ug/L   Nt probnp inpatient (BNP)     Status: None    Collection Time: 01/16/19 10:35 PM   Result Value Ref Range    N-Terminal Pro BNP Inpatient 196 0 - 450 pg/mL           Labs Ordered and Resulted from Time of ED Arrival Up to the Time of Departure from the ED   COMPREHENSIVE METABOLIC PANEL - Abnormal; Notable for the following components:       Result Value    Creatinine 1.39 (*)     All other components within normal limits   CBC WITH PLATELETS DIFFERENTIAL   TROPONIN I   NT PROBNP INPATIENT   CARDIAC CONTINUOUS MONITORING            Assessments & Plan (with Medical Decision Making)  Acute CP in the pt with congenital heart, s/p liver and kidney tx 2016 for ETOH related ESLD, EKG and trop neg, somewhat exertional, D/W ED OBS ESTUARDO-admit to ED OBS for ACS rule out, probably stress echo in am if trop neg. Clinically possible anxiety related-given vistaril.       I have reviewed the nursing notes.    I have reviewed the findings, diagnosis, plan and need for follow up with the patient.       Medication List      There are no discharge medications for this visit.         Final diagnoses:   Chest pain, unspecified type   Status post liver transplantation (H)   Status post kidney transplant   Congenital heart anomaly   Brody DOS SANTOS, am serving as a trained medical scribe to document services personally performed by Ching Faria MD, based on the provider's statements to me.   Ching DOS SANTOS MD, was physically present and have reviewed and verified the accuracy of  this note documented by Brody Bynum.      1/16/2019   [facility], [place], EMERGENCY DEPARTMENT     Ching Faria MD  01/17/19 0032

## 2019-01-17 NOTE — ED TRIAGE NOTES
Pt. Presents to ED with c/o SOB and anxiety that started this afternoon. Pt. Reports hx of panic attacks but also a hx of atrial flutter. Pt. Reports it doesn't feel like his heart is fluttering. AVSS on RA. A & O x 4, independent.

## 2019-01-17 NOTE — PLAN OF CARE
"1. Patient will be afebrile, hemodynamically stable, & on room air, with symptoms of chest pain adequately controlled for discharge - Yes, /78 (BP Location: Left arm)   Pulse 68   Temp 98.5  F (36.9  C) (Oral)   Resp 17   Ht 1.778 m (5' 10\")   Wt 90.1 kg (198 lb 11.2 oz)   SpO2 96%   BMI 28.51 kg/m      2. Patient will complete 2 more troponin (0300 & 0700) per protocol - 1rst and 2nd were negative. 3rd still need to be drawn.     3. Patient will remain free of acute events on telemetry - Yes, monitoring. Patient denies chest pain and denies SOB.    4. Patient will undergo exercise stress testing to r/o ACS on 1/17/19 - Not done yet    5. Patient will have safe disposition planning prior to discharge - No  "

## 2019-01-17 NOTE — ED NOTES
[facility], [place]   ED Nurse to Floor Handoff     Cricket Chen is a 38 year old male who speaks English and lives alone,  in a home  They arrived in the ED by car from home    ED Chief Complaint: Shortness of Breath    ED Dx;   Final diagnoses:   Chest pain, unspecified type   Status post liver transplantation (H)   Status post kidney transplant   Congenital heart anomaly         Needed?: No    Allergies:   Allergies   Allergen Reactions     Hydromorphone Itching   .  Past Medical Hx:   Past Medical History:   Diagnosis Date     Alcohol abuse     Last drink in Mid-April 2014     Anemia in ESRD (end-stage renal disease) (H)      Anxiety 2008     Atrial flutter (H)      Cirrhosis (H)     S/P liver transplant     Depression      History of blood transfusion      History of transposition of great vessels     atrial switch at age 8 months old     Hypertension      Liver transplant recipient (H)     2016     Papillary thyroid carcinoma (H)      Pneumonia 11-15-14     Renal transplant recipient     2016     Varices, esophageal (H)       Baseline Mental status: WDL  Current Mental Status changes: at basesline    Infection present or suspected this encounter: no  Sepsis suspected: No  Isolation type: No active isolations     Activity level - Baseline/Home:  Independent  Activity Level - Current:   Independent    Bariatric equipment needed?: No    In the ED these meds were given:   Medications   hydrOXYzine (ATARAX) tablet 25 mg (not administered)   apixaban ANTICOAGULANT (ELIQUIS) tablet 5 mg (5 mg Oral Given 1/16/19 2330)   magnesium oxide (MAG-OX) tablet 400 mg (400 mg Oral Given 1/16/19 2330)   mycophenolate (CELLCEPT BRAND) capsule 250 mg (250 mg Oral Given 1/16/19 2330)   tacrolimus (GENERIC EQUIVALENT) capsule 1.5 mg (1.5 mg Oral Given 1/16/19 2330)       Drips running?  No    Home pump  No    Current LDAs  Port A Cath Single 04/03/17 Right Chest wall (Active)   Access Date  "01/16/19 1/16/2019 10:34 PM   Access Attempts 1 1/16/2019 10:34 PM   Gauge/Length 20 gauge;3/4 inch 1/16/2019 10:34 PM   Site Assessment WDL 1/16/2019 10:34 PM   Line Status Saline locked 1/16/2019 10:34 PM   Extravasation? No 1/16/2019 10:34 PM   Dressing Intervention Transparent 1/16/2019 10:34 PM   Number of days: 654       Incision/Surgical Site 08/07/17 Neck (Active)   Number of days: 528       Incision/Surgical Site 08/17/18 Upper Arm (Active)   Number of days: 153       Incision/Surgical Site 09/26/18 Right;Lower Abdomen (Active)   Number of days: 113       Labs results:   Labs Ordered and Resulted from Time of ED Arrival Up to the Time of Departure from the ED   COMPREHENSIVE METABOLIC PANEL - Abnormal; Notable for the following components:       Result Value    Creatinine 1.39 (*)     All other components within normal limits   CBC WITH PLATELETS DIFFERENTIAL   TROPONIN I   NT PROBNP INPATIENT   CARDIAC CONTINUOUS MONITORING       Imaging Studies:   Recent Results (from the past 24 hour(s))   XR Chest Port 1 View    Narrative    This is a preliminary resident report. Full report will follow.      Impression    Impression:   No acute cardiopulmonary abnormalities.       Recent vital signs:   /75   Pulse 66   Temp 98.2  F (36.8  C) (Oral)   Resp 16   Ht 1.778 m (5' 10\")   Wt 90.7 kg (200 lb)   SpO2 99%   BMI 28.70 kg/m      Cardiac Rhythm: Normal Sinus  Pt needs tele? Yes  Skin/wound Issues: None    Code Status: Full Code    Pain control: good    Nausea control: pt had none    Abnormal labs/tests/findings requiring intervention:      Family present during ED course? No   Family Comments/Social Situation comments:      Tasks needing completion: None    Dennise Llanos, RN    [phone] [facility]      "

## 2019-01-17 NOTE — CONSULTS
"[facility], [place]    Cardiology Consult       Date of Admission:  1/16/2019    Assessment & Plan   Cricket Chen is a 38 year old male admitted on 1/16/2019 He has a PMH most remarkable for TGA, arrhythmias requiring ablation, alcoholic cirrhosis SP kidney/liver transplant who presented to the ED with a chief complaint of palpitations. Cardiology was consulted for further evaluation and workup.    1. Palpitations. Patient's main complaint is that he is having palpitations. Patient reports adequate exercise tolerance with no signs/symptoms of angina. Patient's main concern is that he is having arrhythmias which is appropriate considering his cardiac history and hisotry of atrial flutter. Has had no arrhythmias since admission, and his symptoms have not returned.  - Recommend discharge with a ZioPatch and follow up in Dr. George clinic in 1 month  - Can consider CT coronary angiogram as an outpatient, but he does not need as an inpatient. Can discharge and have outpatient cardiology team determine if this is indicated.    The patient's care was discussed with the attending Physician, Dr. Cano and the primary team.    Diogo Theodore MD PhD  Cardiology Fellow  P:    ______________________________________________________________________    Reason for Consult: \"chest pain with history of congenital heart defect, HF, aflutter, need recs for stress testing\"    History is obtained from the patient    History of Present Illness   Cricket Chen is a 38-year-old male with a past medical history of dTGA s/p modified Shoemaker operation and stitch closure of small VSD in 1981, atrial flutter s/p DCCV (most recently during 3/2018 hospitalization) and s/p ablation at OSH 8/2000 currently on beta-blocker and chronic anticoagulation, CHF with EF 35-40%, HTN, alcoholic cirrhosis s/p kidney and liver transplant in 2016, who was admitted from the ED to observation for chest discomfort " "and palpitations. The patient reports he was in his usual state of health yesterday morning when he suddenly felt \"off\". He describes feeling \"extra beats\" that was associated with BURNS, lasting about 40 minutes at a time. From ED notes, the patient reported CP, but he now denies that he had pain and was more concerned about the palpitations. He feels like these episodes were similar to prior episodes of atrial flutter - he was most recently hospitalized 3/2018 for atrial flutter and underwent successful DCCV following unremarkable NEELIMA. In the ED, the patient had normal troponins x3 and EKG did not show any acute ST changes and was unchanged from previous EKG. He also had a chest XR that was negative for acute changes. Patient was given hydroxyzine for anxiety and admitted for observation with no further episodes of palpitations or shortness of breath.     This morning, the patient denies any chest pain, palpitations, shortness of breath, or recent fevers or cold-like symptoms    Review of Systems    The 10 point Review of Systems is negative other than noted in the HPI or here.    Past Medical History    I have reviewed this patient's medical history and updated it with pertinent information if needed.   Past Medical History:   Diagnosis Date     Alcohol abuse     Last drink in Mid-April 2014     Anemia in ESRD (end-stage renal disease) (H)      Anxiety 2008     Atrial flutter (H)      Cirrhosis (H)     S/P liver transplant     Depression      History of blood transfusion      History of transposition of great vessels     atrial switch at age 8 months old     Hypertension      Liver transplant recipient (H)     2016     Papillary thyroid carcinoma (H)      Pneumonia 11-15-14     Renal transplant recipient     2016     Varices, esophageal (H)       Past Surgical History   I have reviewed this patient's surgical history and updated it with pertinent information if needed.  Past Surgical History:   Procedure Laterality " Date     ANESTHESIA CARDIOVERSION N/A 3/7/2018    Procedure: ANESTHESIA CARDIOVERSION;  Cardioversion;  Surgeon: GENERIC ANESTHESIA PROVIDER;  Location: [facility] OR     BENCH LIVER N/A 5/10/2016    Procedure: BENCH LIVER;  Surgeon: Ricky Deshpande MD;  Location: [facility]     BIOPSY LYMPH NODE CERVICAL Right 1/26/2017    Procedure: BIOPSY LYMPH NODE CERVICAL;  Surgeon: Beka Soto MD;  Location: [facility] OR     CARDIAC SURGERY  9-10-80     CREATE FISTULA ARTERIOVENOUS UPPER EXTREMITY Right 9/16/2014    Procedure: CREATE FISTULA ARTERIOVENOUS UPPER EXTREMITY;  Surgeon: Padmaja Eaton MD;  Location: [facility] OR     CYSTOSCOPY, REMOVE STENT(S), COMBINED Right 6/22/2016    Procedure: COMBINED CYSTOSCOPY, REMOVE STENT(S);  Surgeon: Ricky Deshpande MD;  [facility]: [facility]     EMBOLECTOMY UPPER EXTREMITY Right 8/17/2018    Procedure: EMBOLECTOMY UPPER EXTREMITY;  Repair Right Upper Arm Pseudo Aneursym ;  Surgeon: John Payton MD;  Location: [facility] OR     ENT SURGERY       ESOPHAGOSCOPY, GASTROSCOPY, DUODENOSCOPY (EGD), COMBINED  5/30/2014    Procedure: COMBINED ESOPHAGOSCOPY, GASTROSCOPY, DUODENOSCOPY (EGD);  Surgeon: Guillaume Bautista MD;  Location:  GI     ESOPHAGOSCOPY, GASTROSCOPY, DUODENOSCOPY (EGD), COMBINED  9/30/14     ESOPHAGOSCOPY, GASTROSCOPY, DUODENOSCOPY (EGD), COMBINED Left 3/12/2015    Procedure: COMBINED ESOPHAGOSCOPY, GASTROSCOPY, DUODENOSCOPY (EGD);  Surgeon: Laureen Galvan MD;  Location:  GI     ESOPHAGOSCOPY, GASTROSCOPY, DUODENOSCOPY (EGD), COMBINED N/A 4/21/2016    Procedure: COMBINED ESOPHAGOSCOPY, GASTROSCOPY, DUODENOSCOPY (EGD);  Surgeon: Laureen Galvan MD;  Location:  GI     ESOPHAGOSCOPY, GASTROSCOPY, DUODENOSCOPY (EGD), COMBINED N/A 7/21/2016    Procedure: COMBINED ESOPHAGOSCOPY, GASTROSCOPY, DUODENOSCOPY (EGD);  Surgeon: Laureen Galvan MD;  Location:  GI     [facility] OR CATH ABLATION NON-CARDIAC ENDOVASCULAR  1990,2002    SVT     INSERT PORT VASCULAR  ACCESS N/A 4/3/2017    Procedure: INSERT PORT VASCULAR ACCESS;  Surgeon: Rajendra Jacques PA-C;  [facility]: [facility]     LIGATE FISTULA ARTERIOVENOUS UPPER EXTREMITY Right 2017    Procedure: LIGATE FISTULA ARTERIOVENOUS UPPER EXTREMITY;  Revision of Right Arm Arteriovenous Fistula ;  Surgeon: Padmaja Eaton MD;  Location: [facility]     PERCUTANEOUS BIOPSY KIDNEY Right 2018    Procedure: PERCUTANEOUS BIOPSY KIDNEY;  Right Kidney Biopsy;  Surgeon: Yuval Khan MD;  [facility]: [facility]     PICC INSERTION  14    PICC line placement 14; Removal 2014     THORACIC SURGERY  1980    Transposition great arteries, repaired at 8 months     THYROIDECTOMY Right 2017    Procedure: THYROIDECTOMY;  Bilateral Total Thyroidectomy ;  Surgeon: Beka Soto MD;  [facility]: [facility]     TRANSPLANT KIDNEY RECIPIENT  DONOR  5/10/2016    Procedure: TRANSPLANT KIDNEY RECIPIENT  DONOR;  Surgeon: Ricky Deshpande MD;  [facility]: [facility]     TRANSPLANT LIVER RECIPIENT  DONOR N/A 5/10/2016    Procedure: TRANSPLANT LIVER RECIPIENT  DONOR;  Surgeon: Ricky Deshpande MD;  Location: [facility]      Social History   I have reviewed this patient's social history and updated it with pertinent information if needed. Cricket Chen  reports that he quit smoking about 18 years ago. His smoking use included cigarettes. He started smoking about 21 years ago. He has a 0.99 pack-year smoking history. He has quit using smokeless tobacco. He reports that he does not drink alcohol or use drugs.    Family History   I have reviewed this patient's family history and updated it with pertinent information if needed.   Family History   Problem Relation Age of Onset     Hypertension Brother      Hypertension Sister      Arthritis Father      Hypertension Father      Hypertension Mother      Hypertension Sister      Alcohol/Drug No family hx of      Gastrointestinal Disease No family hx of         no fam hx of  liver disease or liver cancer     Prior to Admission Medications   Prior to Admission Medications   Prescriptions Last Dose Informant Patient Reported? Taking?   amLODIPine (NORVASC) 10 MG tablet   No No   Sig: TAKE ONE TABLET BY MOUTH EVERY DAY   apixaban ANTICOAGULANT (ELIQUIS) 5 MG tablet   No No   Sig: Take 1 tablet (5 mg) by mouth 2 times daily   desonide (DESOWEN) 0.05 % ointment   No No   Sig: Apply topically 2 times daily   ketoconazole (NIZORAL) 2 % cream   No No   Sig: Twice daily to areas of rash on face   ketoconazole (NIZORAL) 2 % shampoo   No No   Sig: Use as a foaming face wash in shower daily   levothyroxine (SYNTHROID/LEVOTHROID) 137 MCG tablet   No No   Sig: Take 1 tablet by mouth Monday - Saturday  and 1.5 tablets on    lisinopril (PRINIVIL/ZESTRIL) 5 MG tablet   No No   Sig: TAKE ONE TABLET BY MOUTH EVERY DAY   magnesium oxide (MAG-OX) 400 MG tablet   No No   Sig: TAKE ONE TABLET BY MOUTH TWICE A DAY   metoprolol succinate (TOPROL-XL) 25 MG 24 hr tablet   No No   Sig: Take 1 tablet (25 mg) by mouth daily   mycophenolate (GENERIC EQUIVALENT) 250 MG capsule   No No   Sig: Take 1 capsule (250 mg) by mouth 2 times daily   sulfamethoxazole-trimethoprim (BACTRIM/SEPTRA) 400-80 MG per tablet   No No   Sig: Take 1 tablet by mouth daily   tacrolimus (GENERIC EQUIVALENT) 1 MG capsule   No No   Si.5 mg every 12 hours      Facility-Administered Medications: None     Allergies   Allergies   Allergen Reactions     Hydromorphone Itching       Physical Exam   Vital Signs: Temp: 98.2  F (36.8  C) Temp src: Oral BP: 116/72 Pulse: 63 Heart Rate: 74 Resp: 16 SpO2: 98 % O2 Device: None (Room air)    Weight: 198 lbs 11.2 oz    General Appearance: Well appearing, no distress  Eyes: DAVONTE, EOMI  HEENT: NC, AT. MMM.   Respiratory: CTAB, normal work of breathing  Cardiovascular: Regular Rate and Rhythm, normal S1, S2. No murmurs, rubs, gallops  GI: Soft, non-tender, non-distedned  Lymph/Hematologic: No asymetric  swelling, edema. No bruising.  Genitourinary: Deferred  Skin: No rashes, lesions, wounds.  Musculoskeletal: Warm, well perfused  Neurologic: AOx4, CNII-XII intact.  Psychiatric: Euthymic. Mood appropriate.     Data   Data reviewed today: I reviewed all medications, new labs and imaging results over the last 24 hours. I personally reviewed patient's echocardiogram from today; EKGs, and labs.    Recent Labs   Lab 01/17/19  0753 01/17/19  0309 01/16/19  2235 01/15/19  1123   WBC  --   --  6.6 4.8   HGB  --   --  15.2 15.2   MCV  --   --  88 87   PLT  --   --  160 152   NA  --   --  140 135   POTASSIUM  --   --  3.8 3.9   CHLORIDE  --   --  105 104   CO2  --   --  25 24   BUN  --   --  14 18   CR  --   --  1.39* 1.35*   ANIONGAP  --   --  9 7   COLBY  --   --  9.1 9.0   GLC  --   --  88 96   ALBUMIN  --   --  4.3 4.1   PROTTOTAL  --   --  7.4 7.3   BILITOTAL  --   --  0.6 0.9   ALKPHOS  --   --  89 88   ALT  --   --  24 21   AST  --   --  17 15   TROPI <0.015 <0.015 <0.015  --      Most Recent 3 CBC's:  Recent Labs   Lab Test 01/16/19  2235 01/15/19  1123 01/08/19  1125   WBC 6.6 4.8 4.3   HGB 15.2 15.2 15.8   MCV 88 87 87    152 140*     Most Recent 3 BMP's:  Recent Labs   Lab Test 01/16/19  2235 01/15/19  1123 01/08/19  1125    135 138   POTASSIUM 3.8 3.9 4.2   CHLORIDE 105 104 108   CO2 25 24 24   BUN 14 18 18   CR 1.39* 1.35* 1.30*   ANIONGAP 9 7 6   COLBY 9.1 9.0 9.1   GLC 88 96 110*     Most Recent 2 LFT's:  Recent Labs   Lab Test 01/16/19  2235 01/15/19  1123   AST 17 15   ALT 24 21   ALKPHOS 89 88   BILITOTAL 0.6 0.9     Most Recent 3 INR's:  Recent Labs   Lab Test 09/26/18  0625 08/17/18  1115 08/13/18  0827   INR 1.00 1.08 1.11     Recent Results (from the past 24 hour(s))   XR Chest Port 1 View    Narrative    Exam:  Chest X-ray 1/16/2019 10:15 PM    History: cp    Comparison: CT CAP 1/8/2019, chest radiograph 3/6/2018    Findings: Frontal view of the chest. Right IJ Port-A-Cath tip projects  over  the distal SVC. Median sternotomy wires. Surgical clips in the  right upper abdomen. Trachea is midline. Cardiac silhouette is stable.  The cardiomediastinal silhouette is within normal limits. Pulmonary  vasculature is distinct. No pleural effusion or pneumothorax. No focal  pulmonary opacities. No acute bony abnormalities. The upper abdomen is  unremarkable.      Impression    Impression:   No acute cardiopulmonary abnormalities.    I have personally reviewed the examination and initial interpretation  and I agree with the findings.    CHANDRIKA DORANTES MD

## 2019-01-17 NOTE — PLAN OF CARE
"Observation goals PER UNIT ROUTINE     Comments:   1. Patient will be afebrile, hemodynamically stable, & on room air, with symptoms of chest pain adequately controlled for discharge- yes  /72 (BP Location: Left arm)   Pulse 63   Temp 98.1  F (36.7  C) (Oral)   Resp 16   Ht 1.778 m (5' 10\")   Wt 90.1 kg (198 lb 11.2 oz)   SpO2 97%   BMI 28.51 kg/m       2. Patient will complete 2 more troponin (0300 & 0700) per protocol- done   3. Patient will remain free of acute events on telemetry- yes   4. Patient will undergo exercise stress testing to r/o ACS on 1/17/19- no   5. Patient will have safe disposition planning prior to discharge- pending                "

## 2019-01-17 NOTE — DISCHARGE SUMMARY
Discharge Summary    Cricket Chen MRN# [id number]   YOB: 1980 Age: 38 year old     Date of Admission:  1/16/2019  Date of Discharge:  1/17/2019  Admitting Physician:  Roman Howell MD  Discharge Physician:  Dr. Fall  Discharging Service:  Emergency Medicine     Primary Provider: No Ref-Primary, Physician          Discharge Diagnosis:     Chest pain    * No resolved hospital problems. *               Discharge Disposition:   Discharged to home           Condition on Discharge:   Discharge condition: Stable   Code status on discharge: Full Code           Procedures:   No procedures performed during this admission          Discharge Medications:     Current Discharge Medication List      START taking these medications    Details   LORazepam (ATIVAN) 0.5 MG tablet Take 1 tablet (0.5 mg) by mouth every 8 hours as needed for anxiety  Qty: 10 tablet, Refills: 0    Associated Diagnoses: Other chest pain         CONTINUE these medications which have NOT CHANGED    Details   amLODIPine (NORVASC) 10 MG tablet TAKE ONE TABLET BY MOUTH EVERY DAY  Qty: 90 tablet, Refills: 3    Associated Diagnoses: HTN (hypertension); Liver replaced by transplant (H); S/P kidney transplant      apixaban ANTICOAGULANT (ELIQUIS) 5 MG tablet Take 1 tablet (5 mg) by mouth 2 times daily  Qty: 180 tablet, Refills: 3    Associated Diagnoses: Atrial flutter, unspecified type (H)      desonide (DESOWEN) 0.05 % ointment Apply topically 2 times daily  Qty: 15 g, Refills: 3    Associated Diagnoses: Dermatitis, seborrheic      ketoconazole (NIZORAL) 2 % cream Twice daily to areas of rash on face  Qty: 60 g, Refills: 1    Associated Diagnoses: Dermatitis, seborrheic      ketoconazole (NIZORAL) 2 % shampoo Use as a foaming face wash in shower daily  Qty: 120 mL, Refills: 3    Associated Diagnoses: Dermatitis, seborrheic      levothyroxine (SYNTHROID/LEVOTHROID) 137 MCG tablet Take 1 tablet by mouth Monday - Saturday  and 1.5 tablets  on Sunday  Qty: 96 tablet, Refills: 3    Associated Diagnoses: Papillary thyroid carcinoma (H); Postoperative hypothyroidism      lisinopril (PRINIVIL/ZESTRIL) 5 MG tablet TAKE ONE TABLET BY MOUTH EVERY DAY  Qty: 90 tablet, Refills: 3    Associated Diagnoses: HTN (hypertension); Liver replaced by transplant (H); S/P kidney transplant      magnesium oxide (MAG-OX) 400 MG tablet TAKE ONE TABLET BY MOUTH TWICE A DAY  Qty: 120 tablet, Refills: 11    Associated Diagnoses: Hypomagnesemia      metoprolol succinate (TOPROL-XL) 25 MG 24 hr tablet Take 1 tablet (25 mg) by mouth daily  Qty: 90 tablet, Refills: 3    Associated Diagnoses: Atrial flutter, unspecified type (H)      mycophenolate (GENERIC EQUIVALENT) 250 MG capsule Take 1 capsule (250 mg) by mouth 2 times daily  Qty: 60 capsule, Refills: 11    Associated Diagnoses: Kidney replaced by transplant      sulfamethoxazole-trimethoprim (BACTRIM/SEPTRA) 400-80 MG per tablet Take 1 tablet by mouth daily  Qty: 30 tablet, Refills: 11    Associated Diagnoses: Liver replaced by transplant (H); S/P kidney transplant      tacrolimus (GENERIC EQUIVALENT) 1 MG capsule 1.5 mg every 12 hours  Qty: 60 capsule, Refills: 11    Associated Diagnoses: Liver replaced by transplant (H); S/P kidney transplant                   Consultations:   Consultation during this admission received from cardiology             Brief History of Illness:   Please see detailed H&P from 1/17/19, in brief: Cricket Chen is a 38 year old M with a complex PMH significant for 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                Hospital Course:   While in the observation unit the patient's vital signs remained stable, afebrile.  Serial troponins negative X 3, no ectopy on telemetry.  The patient had a congenital TTE and this did not show any acute changes, per cardiology. Cardiology consulted and recommended a zio patch monitor for 2 weeks.  Follow up with EP in one month.    Patient was given all results and instructed to follow up with his PCP in one week.  Return if the patient has new or worsening chest pain, SOB, nausea, or lightheadedness. Patient was in agreement with the plan and was discharged to home in good condition.  He was given a small prescription for prn ativan as these episodes feel like panic attacks for the patient.  Told patient this is not a good long term solution and he needed to see his PCP for further anxiety management.  He was in agreement.                  Final Day of Progress before Discharge:       Physical Exam:  Blood pressure 116/72, pulse 63, temperature 98.2  F (36.8  C), temperature source Oral, resp. rate 16, height 1.778 m (5' 10\"), weight 90.1 kg (198 lb 11.2 oz), SpO2 98 %.    EXAM:  Exam:  Constitutional: healthy, alert and no distress  Cardiovascular: No lifts, heaves, or thrills. RRR. No murmurs, clicks gallops or rub  Respiratory: Good diaphragmatic excursion. Lungs clear  Gastrointestinal: Abdomen soft, non-tender. BS normal. No masses, organomegaly  Musculoskeletal: extremities normal- no gross deformities noted, gait normal and normal muscle tone  Skin: no suspicious lesions or rashes  Neurologic: Gait normal. Alert and oriented.   Psychiatric: mentation appears normal and affect normal/bright    /72 (BP Location: Left arm)   Pulse 63   Temp 98.2  F (36.8  C) (Oral)   Resp 16   Ht 1.778 m (5' 10\")   Wt 90.1 kg (198 lb 11.2 oz)   SpO2 98%   BMI 28.51 kg/m               Data:  All laboratory data " reviewed             Significant Results:     Results for orders placed or performed during the hospital encounter of 01/16/19   XR Chest Port 1 View    Narrative    Exam:  Chest X-ray 1/16/2019 10:15 PM    History: cp    Comparison: CT CAP 1/8/2019, chest radiograph 3/6/2018    Findings: Frontal view of the chest. Right IJ Port-A-Cath tip projects  over the distal SVC. Median sternotomy wires. Surgical clips in the  right upper abdomen. Trachea is midline. Cardiac silhouette is stable.  The cardiomediastinal silhouette is within normal limits. Pulmonary  vasculature is distinct. No pleural effusion or pneumothorax. No focal  pulmonary opacities. No acute bony abnormalities. The upper abdomen is  unremarkable.      Impression    Impression:   No acute cardiopulmonary abnormalities.    I have personally reviewed the examination and initial interpretation  and I agree with the findings.    CHANDRIKA DORANTES MD   CBC with platelets differential   Result Value Ref Range    WBC 6.6 4.0 - 11.0 10e9/L    RBC Count 4.95 4.4 - 5.9 10e12/L    Hemoglobin 15.2 13.3 - 17.7 g/dL    Hematocrit 43.3 40.0 - 53.0 %    MCV 88 78 - 100 fl    MCH 30.7 26.5 - 33.0 pg    MCHC 35.1 31.5 - 36.5 g/dL    RDW 12.4 10.0 - 15.0 %    Platelet Count 160 150 - 450 10e9/L    Diff Method Automated Method     % Neutrophils 73.2 %    % Lymphocytes 17.5 %    % Monocytes 8.2 %    % Eosinophils 0.8 %    % Basophils 0.3 %    % Immature Granulocytes 0.0 %    Nucleated RBCs 0 0 /100    Absolute Neutrophil 4.8 1.6 - 8.3 10e9/L    Absolute Lymphocytes 1.2 0.8 - 5.3 10e9/L    Absolute Monocytes 0.5 0.0 - 1.3 10e9/L    Absolute Eosinophils 0.1 0.0 - 0.7 10e9/L    Absolute Basophils 0.0 0.0 - 0.2 10e9/L    Abs Immature Granulocytes 0.0 0 - 0.4 10e9/L    Absolute Nucleated RBC 0.0    Comprehensive metabolic panel   Result Value Ref Range    Sodium 140 133 - 144 mmol/L    Potassium 3.8 3.4 - 5.3 mmol/L    Chloride 105 94 - 109 mmol/L    Carbon Dioxide 25 20 - 32  mmol/L    Anion Gap 9 3 - 14 mmol/L    Glucose 88 70 - 99 mg/dL    Urea Nitrogen 14 7 - 30 mg/dL    Creatinine 1.39 (H) 0.66 - 1.25 mg/dL    GFR Estimate 64 >60 mL/min/[1.73_m2]    GFR Estimate If Black 74 >60 mL/min/[1.73_m2]    Calcium 9.1 8.5 - 10.1 mg/dL    Bilirubin Total 0.6 0.2 - 1.3 mg/dL    Albumin 4.3 3.4 - 5.0 g/dL    Protein Total 7.4 6.8 - 8.8 g/dL    Alkaline Phosphatase 89 40 - 150 U/L    ALT 24 0 - 70 U/L    AST 17 0 - 45 U/L   Troponin I   Result Value Ref Range    Troponin I ES <0.015 0.000 - 0.045 ug/L   Nt probnp inpatient (BNP)   Result Value Ref Range    N-Terminal Pro BNP Inpatient 196 0 - 450 pg/mL   T4 free   Result Value Ref Range    T4 Free 1.33 0.76 - 1.46 ng/dL   Troponin I   Result Value Ref Range    Troponin I ES <0.015 0.000 - 0.045 ug/L   Troponin I   Result Value Ref Range    Troponin I ES <0.015 0.000 - 0.045 ug/L   EKG 12-lead, tracing only   Result Value Ref Range    Interpretation ECG Click View Image link to view waveform and result    ECHO Congenital Transthoracic (TTE)    Narrative    [id number]  [id number]  [id number]  [id number]^MARCELLA^HILLARY^EVERARDO                                                                   Study ID: [id number]                                                 [facility]'[address] [address]                                                Phone: [phone]                                Pediatric Echocardiogram  _____________________________________________________________________________  __     Name: IJEOMA DOBSON  Study Date: 2019 01:02 PM             Patient Location: [id number]  MRN: [id number]                             Age: 38 yrs  : 1980  Gender: Male  Patient Class: Obstetrics                   Height: 178 cm  Ordering Provider: HILLARY NARANJO              Weight: 90 kg                                              BSA: 2.1 m2  Performed By: Afsaneh Webster  Report approved by: Brenda Cummings MD  Reason For Study: Chest Pain  _____________________________________________________________________________  __     ------CONCLUSIONS------  Patient after atrial switch operation for complete transposition of the great  arteries. Technically difficult study due to poor acoustic windows. There is  moderate right ventricular enlargement. The right ventricular function is  qualitatively moderately depressed. Qualitivley the right ventricle function  is unchanged form the 3/30/2018 echo. Mild (1+) tricuspid valve insufficiency.  No obvious baffle obstruction or leaks seen. Qualitatively normal left  ventricular systolic function. Mild (1+) pulmonary valve insufficiency.  Limited visualization of the LPA suggests narrowing. RPA not seen.  _____________________________________________________________________________  __        Technical information:  A complete two dimensional, MMODE, spectral and color Doppler transthoracic  echocardiogram is performed. Images are obtained from parasternal, apical,  subcostal and suprasternal notch views. Technically difficult study due to  poor acoustic windows. ECG tracing shows regular rhythm.     Segmental Anatomy:  There is normal atrial arrangement, with concordant atrioventricular  connections, discordant ventriculoarterial connections, and aorta anterior and  rightward to the pulmonary artery.     Color flow demonstrates flow from at least one pulmonary vein entering the  left atrium.     Atria and atrial septum:  Post atrial switch procedure. The systemic venous baffle appears unobstructed.  The pulmonary venous baffle appears unobstructed. There are no baffle leaks.        Atrioventricular valves:  The tricuspid valve is normal in appearance and motion. Mild (1+) tricuspid  valve insufficiency. The mitral valve is  normal in appearance and motion.  There is no mitral valve insufficiency.     Ventricles and Ventricular Septum:  There is moderate right ventricular enlargement. The right ventricular  function is qualitatively moderately depressed. Normal left ventricular size.  Normal left ventricular systolic function. There is no ventricular level  shunting.     Outflow tracts:  Complete transposition of the great arteries (D-TGA). There is unobstructed  flow through the right ventricular outflow tract. The pulmonary valve and  aortic valve have normal appearance and motion. There is normal flow across  the pulmonary valve. Mild (1+) pulmonary valve insufficiency. There is  unobstructed flow through the left ventricular outflow tract. Tricuspid aortic  valve with normal appearance and motion. There is normal flow across the  aortic valve.     Great arteries:  The main pulmonary artery has normal appearance. There is unobstructed flow in  the main pulmonary artery. The branch pulmonary arteries are not seen with  this study. Limited visualization of the LPA suggests narrowing. RPA not seen.  Normal ascending aorta. The aortic arch appears normal. There is unobstructed  antegrade flow in the ascending, transverse arch, descending thoracic and  abdominal aorta.     Arterial Shunts:  There is no arterial level shunting.     Coronaries:  The coronary arteries are not evaluated.        Effusions, catheters, cannulas and leads:  No pericardial effusion.        Report approved by: Kit Conrad 01/17/2019 02:33 PM        *Note: Due to a large number of results and/or encounters for the requested time period, some results have not been displayed. A complete set of results can be found in Results Review.      Recent Results (from the past 48 hour(s))   XR Chest Port 1 View    Narrative    Exam:  Chest X-ray 1/16/2019 10:15 PM    History: cp    Comparison: CT CAP 1/8/2019, chest radiograph 3/6/2018    Findings: Frontal view of  the chest. Right IJ Port-A-Cath tip projects  over the distal SVC. Median sternotomy wires. Surgical clips in the  right upper abdomen. Trachea is midline. Cardiac silhouette is stable.  The cardiomediastinal silhouette is within normal limits. Pulmonary  vasculature is distinct. No pleural effusion or pneumothorax. No focal  pulmonary opacities. No acute bony abnormalities. The upper abdomen is  unremarkable.      Impression    Impression:   No acute cardiopulmonary abnormalities.    I have personally reviewed the examination and initial interpretation  and I agree with the findings.    CHANDRIKA DORANTES MD                Pending Results:   Unresulted Labs Ordered in the Past 30 Days of this Admission     No orders found for last 61 day(s).                  Discharge Instructions and Follow-Up:     Discharge Procedure Orders   Reason for your hospital stay   Order Comments: You were admitted to the observation unit for evaluation of your chest pain.  You had no EKG changes, labs checking for heart damage were negative, your resting echo was unchanged from prior.  Cardiology was consulted and recommended zio patch holter for 2 weeks and follow up with EP in one month     When to contact your care team   Order Comments: Return to the ER if you have new or worsening chest pain, shortness of breath, jaw or arm pain, or fainting.     Activity   Order Comments: Your activity upon discharge: activity as tolerated     Order Specific Question Answer Comments   Is discharge order? Yes      Full Code     Order Specific Question Answer Comments   Code status determined by: Discussion with patient/legal decision maker      Diet   Order Comments: Follow this diet upon discharge: Orders Placed This Encounter      Low Saturated Fat Na <2400 mg     Order Specific Question Answer Comments   Is discharge order? Yes           Attestation:  Jolene Dailey.  APRN, CNP

## 2019-01-17 NOTE — PROGRESS NOTES
"S:patient admit for chest pain that is better now. Patient hx of TSOGV when young.  Patient also with some flushing of hands bilateral x 1 week.   Patient wonders if viral sx.  No gerd sx  O:/72 (BP Location: Left arm)   Pulse 63   Temp 98.2  F (36.8  C) (Oral)   Resp 16   Ht 1.778 m (5' 10\")   Wt 90.1 kg (198 lb 11.2 oz)   SpO2 98%   BMI 28.51 kg/m    Results for orders placed or performed during the hospital encounter of 01/16/19   XR Chest Port 1 View    Narrative    Exam:  Chest X-ray 1/16/2019 10:15 PM    History: cp    Comparison: CT CAP 1/8/2019, chest radiograph 3/6/2018    Findings: Frontal view of the chest. Right IJ Port-A-Cath tip projects  over the distal SVC. Median sternotomy wires. Surgical clips in the  right upper abdomen. Trachea is midline. Cardiac silhouette is stable.  The cardiomediastinal silhouette is within normal limits. Pulmonary  vasculature is distinct. No pleural effusion or pneumothorax. No focal  pulmonary opacities. No acute bony abnormalities. The upper abdomen is  unremarkable.      Impression    Impression:   No acute cardiopulmonary abnormalities.    I have personally reviewed the examination and initial interpretation  and I agree with the findings.    CHANDRIKA DORANTES MD   CBC with platelets differential   Result Value Ref Range    WBC 6.6 4.0 - 11.0 10e9/L    RBC Count 4.95 4.4 - 5.9 10e12/L    Hemoglobin 15.2 13.3 - 17.7 g/dL    Hematocrit 43.3 40.0 - 53.0 %    MCV 88 78 - 100 fl    MCH 30.7 26.5 - 33.0 pg    MCHC 35.1 31.5 - 36.5 g/dL    RDW 12.4 10.0 - 15.0 %    Platelet Count 160 150 - 450 10e9/L    Diff Method Automated Method     % Neutrophils 73.2 %    % Lymphocytes 17.5 %    % Monocytes 8.2 %    % Eosinophils 0.8 %    % Basophils 0.3 %    % Immature Granulocytes 0.0 %    Nucleated RBCs 0 0 /100    Absolute Neutrophil 4.8 1.6 - 8.3 10e9/L    Absolute Lymphocytes 1.2 0.8 - 5.3 10e9/L    Absolute Monocytes 0.5 0.0 - 1.3 10e9/L    Absolute Eosinophils 0.1 0.0 " - 0.7 10e9/L    Absolute Basophils 0.0 0.0 - 0.2 10e9/L    Abs Immature Granulocytes 0.0 0 - 0.4 10e9/L    Absolute Nucleated RBC 0.0    Comprehensive metabolic panel   Result Value Ref Range    Sodium 140 133 - 144 mmol/L    Potassium 3.8 3.4 - 5.3 mmol/L    Chloride 105 94 - 109 mmol/L    Carbon Dioxide 25 20 - 32 mmol/L    Anion Gap 9 3 - 14 mmol/L    Glucose 88 70 - 99 mg/dL    Urea Nitrogen 14 7 - 30 mg/dL    Creatinine 1.39 (H) 0.66 - 1.25 mg/dL    GFR Estimate 64 >60 mL/min/[1.73_m2]    GFR Estimate If Black 74 >60 mL/min/[1.73_m2]    Calcium 9.1 8.5 - 10.1 mg/dL    Bilirubin Total 0.6 0.2 - 1.3 mg/dL    Albumin 4.3 3.4 - 5.0 g/dL    Protein Total 7.4 6.8 - 8.8 g/dL    Alkaline Phosphatase 89 40 - 150 U/L    ALT 24 0 - 70 U/L    AST 17 0 - 45 U/L   Troponin I   Result Value Ref Range    Troponin I ES <0.015 0.000 - 0.045 ug/L   Nt probnp inpatient (BNP)   Result Value Ref Range    N-Terminal Pro BNP Inpatient 196 0 - 450 pg/mL   T4 free   Result Value Ref Range    T4 Free 1.33 0.76 - 1.46 ng/dL   Troponin I   Result Value Ref Range    Troponin I ES <0.015 0.000 - 0.045 ug/L   Troponin I   Result Value Ref Range    Troponin I ES <0.015 0.000 - 0.045 ug/L   EKG 12-lead, tracing only   Result Value Ref Range    Interpretation ECG Click View Image link to view waveform and result    Cardiology General Adult IP Consult: Patient to be seen: Routine within 24 hrs; Call back #: [id number]; chest pain with history of congenital heart defect, HF, aflutter, need recs for stress testing; Consultant may enter orders: Diogo Alonso MD     1/17/2019  5:12 PM  [facility]    Cardiology Consult       Date of Admission:  1/16/2019    Assessment & Plan   Cricket Chen is a 38 year old male admitted on 1/16/2019 He   has a PMH most remarkable for TGA, arrhythmias requiring   ablation, alcoholic cirrhosis SP kidney/liver transplant who   presented to the ED with a  "chief complaint of palpitations.   Cardiology was consulted for further evaluation and workup.    1. Palpitations. Patient's main complaint is that he is having   palpitations. Patient reports adequate exercise tolerance with no   signs/symptoms of angina. Patient's main concern is that he is   having arrhythmias which is appropriate considering his cardiac   history and hisotry of atrial flutter. Has had no arrhythmias   since admission, and his symptoms have not returned.  - Recommend discharge with a ZioPatch and follow up in Dr. George   clinic in 1 month  - Can consider CT coronary angiogram as an outpatient, but he   does not need as an inpatient. Can discharge and have outpatient   cardiology team determine if this is indicated.    The patient's care was discussed with the attending Physician,   Dr. Cano and the primary team.    Diogo Theodore MD PhD  Cardiology Fellow  P:    __________________________________________________________________  ____    Reason for Consult: \"chest pain with history of congenital heart   defect, HF, aflutter, need recs for stress testing\"    History is obtained from the patient    History of Present Illness   Cricket Chen is a 38-year-old male with a past medical history   of dTGA s/p modified Shoemaker operation and stitch closure of   small VSD in 1981, atrial flutter s/p DCCV (most recently during   3/2018 hospitalization) and s/p ablation at OSH 8/2000 currently   on beta-blocker and chronic anticoagulation, CHF with EF 35-40%,   HTN, alcoholic cirrhosis s/p kidney and liver transplant in 2016,   who was admitted from the ED to observation for chest discomfort   and palpitations. The patient reports he was in his usual state   of health yesterday morning when he suddenly felt \"off\". He   describes feeling \"extra beats\" that was associated with BURNS,   lasting about 40 minutes at a time. From ED notes, the patient   reported CP, but he now denies that he had " pain and was more   concerned about the palpitations. He feels like these episodes   were similar to prior episodes of atrial flutter - he was most   recently hospitalized 3/2018 for atrial flutter and underwent   successful DCCV following unremarkable NEELIMA. In the ED, the   patient had normal troponins x3 and EKG did not show any acute ST   changes and was unchanged from previous EKG. He also had a chest   XR that was negative for acute changes. Patient was given   hydroxyzine for anxiety and admitted for observation with no   further episodes of palpitations or shortness of breath.     This morning, the patient denies any chest pain, palpitations,   shortness of breath, or recent fevers or cold-like symptoms    Review of Systems    The 10 point Review of Systems is negative other than noted in   the HPI or here.    Past Medical History    I have reviewed this patient's medical history and updated it   with pertinent information if needed.   Past Medical History:   Diagnosis Date     Alcohol abuse     Last drink in Mid-April 2014     Anemia in ESRD (end-stage renal disease) (H)      Anxiety 2008     Atrial flutter (H)      Cirrhosis (H)     S/P liver transplant     Depression      History of blood transfusion      History of transposition of great vessels     atrial switch at age 8 months old     Hypertension      Liver transplant recipient (H)     2016     Papillary thyroid carcinoma (H)      Pneumonia 11-15-14     Renal transplant recipient     2016     Varices, esophageal (H)       Past Surgical History   I have reviewed this patient's surgical history and updated it   with pertinent information if needed.  Past Surgical History:   Procedure Laterality Date     ANESTHESIA CARDIOVERSION N/A 3/7/2018    Procedure: ANESTHESIA CARDIOVERSION;  Cardioversion;  Surgeon:   GENERIC ANESTHESIA PROVIDER;  Location:  OR     BENCH LIVER N/A 5/10/2016    Procedure: BENCH LIVER;  Surgeon: Ricky Deshpande MD;     Location: [facility] OR     BIOPSY LYMPH NODE CERVICAL Right 1/26/2017    Procedure: BIOPSY LYMPH NODE CERVICAL;  Surgeon: Beka Soto MD;  Location: [facility] OR     CARDIAC SURGERY  9-10-80     CREATE FISTULA ARTERIOVENOUS UPPER EXTREMITY Right 9/16/2014    Procedure: CREATE FISTULA ARTERIOVENOUS UPPER EXTREMITY;    Surgeon: Padmaja Eaton MD;  Location: [facility] OR     CYSTOSCOPY, REMOVE STENT(S), COMBINED Right 6/22/2016    Procedure: COMBINED CYSTOSCOPY, REMOVE STENT(S);  Surgeon:   Ricky Deshpande MD;  Location: [facility]     EMBOLECTOMY UPPER EXTREMITY Right 8/17/2018    Procedure: EMBOLECTOMY UPPER EXTREMITY;  Repair Right Upper Arm   Pseudo Aneursym ;  Surgeon: John Payton MD;    Location: [facility] OR     ENT SURGERY       ESOPHAGOSCOPY, GASTROSCOPY, DUODENOSCOPY (EGD), COMBINED    5/30/2014    Procedure: COMBINED ESOPHAGOSCOPY, GASTROSCOPY, DUODENOSCOPY   (EGD);  Surgeon: Guillaume Bautista MD;  Location:  GI     ESOPHAGOSCOPY, GASTROSCOPY, DUODENOSCOPY (EGD), COMBINED    9/30/14     ESOPHAGOSCOPY, GASTROSCOPY, DUODENOSCOPY (EGD), COMBINED Left   3/12/2015    Procedure: COMBINED ESOPHAGOSCOPY, GASTROSCOPY, DUODENOSCOPY   (EGD);  Surgeon: Laureen Galvan MD;  Location:    GI     ESOPHAGOSCOPY, GASTROSCOPY, DUODENOSCOPY (EGD), COMBINED N/A   4/21/2016    Procedure: COMBINED ESOPHAGOSCOPY, GASTROSCOPY, DUODENOSCOPY   (EGD);  Surgeon: Laureen Galvan MD;  Location:    GI     ESOPHAGOSCOPY, GASTROSCOPY, DUODENOSCOPY (EGD), COMBINED N/A   7/21/2016    Procedure: COMBINED ESOPHAGOSCOPY, GASTROSCOPY, DUODENOSCOPY   (EGD);  Surgeon: Laureen Galvna MD;  Location:    GI     [facility] OR CATH ABLATION NON-CARDIAC ENDOVASCULAR  1990,2002    SVT     INSERT PORT VASCULAR ACCESS N/A 4/3/2017    Procedure: INSERT PORT VASCULAR ACCESS;  Surgeon: Rajendra Jacques PA-C;  [facility]: [facility]     LIGATE FISTULA ARTERIOVENOUS UPPER EXTREMITY Right 11/7/2017    Procedure: LIGATE FISTULA  ARTERIOVENOUS UPPER EXTREMITY;    Revision of Right Arm Arteriovenous Fistula ;  Surgeon: Padmaja Eaton MD;  Location: [facility] OR     PERCUTANEOUS BIOPSY KIDNEY Right 2018    Procedure: PERCUTANEOUS BIOPSY KIDNEY;  Right Kidney Biopsy;    Surgeon: Yuval Khan MD;  [facility]: [facility]     PICC INSERTION  14    PICC line placement 14; Removal 2014     THORACIC SURGERY  1980    Transposition great arteries, repaired at 8 months     THYROIDECTOMY Right 2017    Procedure: THYROIDECTOMY;  Bilateral Total Thyroidectomy ;    Surgeon: Beka Soto MD;  Location: [facility] OR     TRANSPLANT KIDNEY RECIPIENT  DONOR  5/10/2016    Procedure: TRANSPLANT KIDNEY RECIPIENT  DONOR;  Surgeon:   Ricky Deshpande MD;  Location: [facility]     TRANSPLANT LIVER RECIPIENT  DONOR N/A 5/10/2016    Procedure: TRANSPLANT LIVER RECIPIENT  DONOR;  Surgeon:   Ricky Deshpande MD;  Location: [facility] OR      Social History   I have reviewed this patient's social history and updated it with   pertinent information if needed. Cricket Chen  reports that he   quit smoking about 18 years ago. His smoking use included   cigarettes. He started smoking about 21 years ago. He has a 0.99   pack-year smoking history. He has quit using smokeless tobacco.   He reports that he does not drink alcohol or use drugs.    Family History   I have reviewed this patient's family history and updated it with   pertinent information if needed.   Family History   Problem Relation Age of Onset     Hypertension Brother      Hypertension Sister      Arthritis Father      Hypertension Father      Hypertension Mother      Hypertension Sister      Alcohol/Drug No family hx of      Gastrointestinal Disease No family hx of         no fam hx of liver disease or liver cancer     Prior to Admission Medications   Prior to Admission Medications   Prescriptions Last Dose Informant Patient Reported? Taking?   amLODIPine (NORVASC) 10 MG  tablet   No No   Sig: TAKE ONE TABLET BY MOUTH EVERY DAY   apixaban ANTICOAGULANT (ELIQUIS) 5 MG tablet   No No   Sig: Take 1 tablet (5 mg) by mouth 2 times daily   desonide (DESOWEN) 0.05 % ointment   No No   Sig: Apply topically 2 times daily   ketoconazole (NIZORAL) 2 % cream   No No   Sig: Twice daily to areas of rash on face   ketoconazole (NIZORAL) 2 % shampoo   No No   Sig: Use as a foaming face wash in shower daily   levothyroxine (SYNTHROID/LEVOTHROID) 137 MCG tablet   No No   Sig: Take 1 tablet by mouth Monday - Saturday  and 1.5 tablets on      lisinopril (PRINIVIL/ZESTRIL) 5 MG tablet   No No   Sig: TAKE ONE TABLET BY MOUTH EVERY DAY   magnesium oxide (MAG-OX) 400 MG tablet   No No   Sig: TAKE ONE TABLET BY MOUTH TWICE A DAY   metoprolol succinate (TOPROL-XL) 25 MG 24 hr tablet   No No   Sig: Take 1 tablet (25 mg) by mouth daily   mycophenolate (GENERIC EQUIVALENT) 250 MG capsule   No No   Sig: Take 1 capsule (250 mg) by mouth 2 times daily   sulfamethoxazole-trimethoprim (BACTRIM/SEPTRA) 400-80 MG per   tablet   No No   Sig: Take 1 tablet by mouth daily   tacrolimus (GENERIC EQUIVALENT) 1 MG capsule   No No   Si.5 mg every 12 hours      Facility-Administered Medications: None     Allergies   Allergies   Allergen Reactions     Hydromorphone Itching       Physical Exam   Vital Signs: Temp: 98.2  F (36.8  C) Temp src: Oral BP: 116/72   Pulse: 63 Heart Rate: 74 Resp: 16 SpO2: 98 % O2 Device: None   (Room air)    Weight: 198 lbs 11.2 oz    General Appearance: Well appearing, no distress  Eyes: DAVONTE, EOMI  HEENT: NC, AT. MMM.   Respiratory: CTAB, normal work of breathing  Cardiovascular: Regular Rate and Rhythm, normal S1, S2. No   murmurs, rubs, gallops  GI: Soft, non-tender, non-distedned  Lymph/Hematologic: No asymetric swelling, edema. No bruising.  Genitourinary: Deferred  Skin: No rashes, lesions, wounds.  Musculoskeletal: Warm, well perfused  Neurologic: AOx4, CNII-XII intact.  Psychiatric:  Euthymic. Mood appropriate.     Data   Data reviewed today: I reviewed all medications, new labs and   imaging results over the last 24 hours. I personally reviewed   patient's echocardiogram from today; EKGs, and labs.    Recent Labs   Lab 01/17/19  0753 01/17/19  0309 01/16/19  2235 01/15/19  1123   WBC  --   --  6.6 4.8   HGB  --   --  15.2 15.2   MCV  --   --  88 87   PLT  --   --  160 152   NA  --   --  140 135   POTASSIUM  --   --  3.8 3.9   CHLORIDE  --   --  105 104   CO2  --   --  25 24   BUN  --   --  14 18   CR  --   --  1.39* 1.35*   ANIONGAP  --   --  9 7   COLBY  --   --  9.1 9.0   GLC  --   --  88 96   ALBUMIN  --   --  4.3 4.1   PROTTOTAL  --   --  7.4 7.3   BILITOTAL  --   --  0.6 0.9   ALKPHOS  --   --  89 88   ALT  --   --  24 21   AST  --   --  17 15   TROPI <0.015 <0.015 <0.015  --      Most Recent 3 CBC's:  Recent Labs   Lab Test 01/16/19  2235 01/15/19  1123 01/08/19  1125   WBC 6.6 4.8 4.3   HGB 15.2 15.2 15.8   MCV 88 87 87    152 140*     Most Recent 3 BMP's:  Recent Labs   Lab Test 01/16/19  2235 01/15/19  1123 01/08/19  1125    135 138   POTASSIUM 3.8 3.9 4.2   CHLORIDE 105 104 108   CO2 25 24 24   BUN 14 18 18   CR 1.39* 1.35* 1.30*   ANIONGAP 9 7 6   COLBY 9.1 9.0 9.1   GLC 88 96 110*     Most Recent 2 LFT's:  Recent Labs   Lab Test 01/16/19  2235 01/15/19  1123   AST 17 15   ALT 24 21   ALKPHOS 89 88   BILITOTAL 0.6 0.9     Most Recent 3 INR's:  Recent Labs   Lab Test 09/26/18  0625 08/17/18  1115 08/13/18  0827   INR 1.00 1.08 1.11     Recent Results (from the past 24 hour(s))   XR Chest Port 1 View    Narrative    Exam:  Chest X-ray 1/16/2019 10:15 PM    History: cp    Comparison: CT CAP 1/8/2019, chest radiograph 3/6/2018    Findings: Frontal view of the chest. Right IJ Port-A-Cath tip   projects  over the distal SVC. Median sternotomy wires. Surgical clips in   the  right upper abdomen. Trachea is midline. Cardiac silhouette is   stable.  The cardiomediastinal silhouette  is within normal limits.   Pulmonary  vasculature is distinct. No pleural effusion or pneumothorax. No   focal  pulmonary opacities. No acute bony abnormalities. The upper   abdomen is  unremarkable.      Impression    Impression:   No acute cardiopulmonary abnormalities.    I have personally reviewed the examination and initial   interpretation  and I agree with the findings.    CHANDRIKA DORANTES MD         ECHO Congenital Transthoracic (TTE)    Narrative    [id number]  [id number]  [id number]  [id number]^MARCELLA^HILLARY^EVERARDO                                                                   Study ID: [id number]                                                 [facility] [place] [address]                                                Phone: [phone]                                Pediatric Echocardiogram  _____________________________________________________________________________  __     Name: BRONSONIJEOMA  Study Date: 2019 01:02 PM             Patient Location: [facility]  MRN: [id number]                             Age: 38 yrs  : 1980  Gender: Male  Patient Class: Obstetrics                   Height: 178 cm  Ordering Provider: HILLARY NARANJO             Weight: 90 kg                                              BSA: 2.1 m2  Performed By: Afsaneh Webster  Report approved by: Brenda Cummings MD  Reason For Study: Chest Pain  _____________________________________________________________________________  __     ------CONCLUSIONS------  Patient after atrial switch operation for complete transposition of the great  arteries. Technically difficult study due to poor acoustic windows. There is  moderate right ventricular enlargement. The right ventricular function is  qualitatively moderately depressed.  Qualitivley the right ventricle function  is unchanged form the 3/30/2018 echo. Mild (1+) tricuspid valve insufficiency.  No obvious baffle obstruction or leaks seen. Qualitatively normal left  ventricular systolic function. Mild (1+) pulmonary valve insufficiency.  Limited visualization of the LPA suggests narrowing. RPA not seen.  _____________________________________________________________________________  __        Technical information:  A complete two dimensional, MMODE, spectral and color Doppler transthoracic  echocardiogram is performed. Images are obtained from parasternal, apical,  subcostal and suprasternal notch views. Technically difficult study due to  poor acoustic windows. ECG tracing shows regular rhythm.     Segmental Anatomy:  There is normal atrial arrangement, with concordant atrioventricular  connections, discordant ventriculoarterial connections, and aorta anterior and  rightward to the pulmonary artery.     Color flow demonstrates flow from at least one pulmonary vein entering the  left atrium.     Atria and atrial septum:  Post atrial switch procedure. The systemic venous baffle appears unobstructed.  The pulmonary venous baffle appears unobstructed. There are no baffle leaks.        Atrioventricular valves:  The tricuspid valve is normal in appearance and motion. Mild (1+) tricuspid  valve insufficiency. The mitral valve is normal in appearance and motion.  There is no mitral valve insufficiency.     Ventricles and Ventricular Septum:  There is moderate right ventricular enlargement. The right ventricular  function is qualitatively moderately depressed. Normal left ventricular size.  Normal left ventricular systolic function. There is no ventricular level  shunting.     Outflow tracts:  Complete transposition of the great arteries (D-TGA). There is unobstructed  flow through the right ventricular outflow tract. The pulmonary valve and  aortic valve have normal appearance and motion. There  is normal flow across  the pulmonary valve. Mild (1+) pulmonary valve insufficiency. There is  unobstructed flow through the left ventricular outflow tract. Tricuspid aortic  valve with normal appearance and motion. There is normal flow across the  aortic valve.     Great arteries:  The main pulmonary artery has normal appearance. There is unobstructed flow in  the main pulmonary artery. The branch pulmonary arteries are not seen with  this study. Limited visualization of the LPA suggests narrowing. RPA not seen.  Normal ascending aorta. The aortic arch appears normal. There is unobstructed  antegrade flow in the ascending, transverse arch, descending thoracic and  abdominal aorta.     Arterial Shunts:  There is no arterial level shunting.     Coronaries:  The coronary arteries are not evaluated.        Effusions, catheters, cannulas and leads:  No pericardial effusion.        Report approved by: Kit Conrad 01/17/2019 02:33 PM        *Note: Due to a large number of results and/or encounters for the requested time period, some results have not been displayed. A complete set of results can be found in Results Review.       A:chest pain  With hx of congenital heart disease with surgery  P:congenital echo with cardiology recommendations.

## 2019-01-17 NOTE — H&P
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    # Chest pain rule out - Observation Protocol   - follow VS q 4 hours, continuous telemetry monitoring, NPO overnight before stress test, ECG/troponin on admission (done in ER at 2235 pm) was normal & every 4 hours x 2 (complete at  2:30 am & 6:30 am respectively), exercise stress test per ER Hibino recommendation    # Pink / red hands consider Rayunads, history vascular surgery this past summer, no obvious limb compromise but patient feeling anxious about this finding  # Chills, fatigue, BURNS r/o cardiac etiology above, check thyroid studies, patient isn't anemia, recent PET showing cancer is in remission     CARDIAC   # Complete transposition of the great vessels s/p baffle repair in 1981  # Atrial flutter s/p cardioversion Cardioversion, Antiarrhythmics and/or ablation are options for rhythm control. He has previously been on Sotalol. Not currently on AATs. S/p DCCV 3/7/18  with history of Ablation at OSH in 2000. Continue Metoprolol XL 25 mg daily but hold for procedure    # Chronic anticoagulation with Eliquis - CHADSVASC=+HF, +HTN  2, corresponding to a 2.2% annual stroke / systemic emolism event rate. indicating need for long term oral  anticoagulation    # Congestive HF systemic EF 45-40% NYHA I-II   Most recent echo from 3/30/18 showed systemic EF 35-40%, mild TR, and no evidence of baffle leaks/obstructions. A CMRI from OSH from 3/2017 showed no baffle  Obstruction and systemic EF 33%. On lisinopril, metoprolol outpatient.     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 s/p RSST protocol with 4 weekly infusions of rituximab followed by R-COEP x4, most recent follow up imaging consistent with CR 1/8/2019  - Follow up with Yamilka FRYE again in 6 months    # Papillary thyroid carcinoma s/p thyroidectomy 08/2017  # Hypothyroidism - continue synthroid    # Anxiety & Depression atarax given PRN in the ER    FEN  - no IVF   - no electrolytes needed   - diet per stress ECHO protocol   PPX  - none ordered, could consider PPI / antacid at discharge if pain non cardiac   CODE  - FULL   DISPO  - discharge criteria include stable VS & symptoms, normal serial cardiac markers & EKGs, negative stress test to r/o ACS, and no serious cause of symptom identified      Melissa Kim  [facility]  [phone]  Hematology/Oncology  January 17, 2019    HISTORY OF PRESENT ILLNESS  History obtained from chart review and discussion with the patient.     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 I met with Cricket in the ER at 1:15 am, he was wide awake & pleasant, cooperative. He reports he's just been feeling off the last couple of days, with new chest discomfort starting at 4 pm this afternoon. He reports at first he noticed feeling chilled frequently (without evidence of fever & no infectious symptoms). Then he reports noticing he was frequently having 'red hands' that feel cool to him, but warm when others touch his hands. He then notes progressive dyspnea on exertion the last couple of days. Even small tasks for him - like carrying in a case of water have required rest & time to catch his breath. He reports these things have made him nervous & he's had trouble falling asleep the last couple nights - staying up until 3 & 4 in the AM when he's finally able to fall asleep reclined in chair (vs flat in the bed). He reports a infrequent dry cough. He reports more GERD / heart burn symptoms lately after consuming spicy food, something that hadn't bothered him before. He's been off his PPI (protonix) for awhile. He denies changes in bowel or urinary habits. He reports a good appetite, no weight loss. He denies headache. He denies new changes in his medications, no new supplements, isn't using tobacco, drugs or alcohol. Denies new lifestyle changes or stresses other than these new symptoms are stressing him out. He denies any sick contacts at home. 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 He lives at home with his parents in [facility] [place] [facility]. He denies having a PCP see's only the specialists (EP, oncology, cardiology, transplant, etc) but tries to see them around the same time of year so he can keep it all straight. He reports the last  time he felt similar to this was when he was admitted to the hospital & found to be in a flutter & had to undergo a procedure to shock his heart. This prompted him to come to the ER he was concerned something is wrong with his heart & would like to be admitted and have these issues investigated. We discussed observation status, being admitted for a stress test, based on results may prompt discussion with cardiologist vs outpatient referral. He reports atarax PRN didn't change how he felt, but he was quite calm/relaxed upon my interview, the ER physician had said he was quite anxious earlier. We discussed Raynaud's syndrome (he had asked Dr Prather about his hands & then his cardiologists nurse who had suggested it might be Raynaud's) & hypothyroidism (if he had been compliant with his levothyroxine -- feeling cold/fatigued but yet can't sleep?). I answered his questions to the best of my ability and plan to have team follow up with him in the AM.     EKG in ER is normal sinus rhythm rate 77, no ectopy, no acute ischemic changes, unchanged from July 2017. S/P CXR without any acute cardiopulmonary abnormalities. CBC unremarkable other than platelet (at baseline 160, r/t splenomegaly per nephrology note), ANC 4.8. CMP with Cr 1.39 LFTs normal, first troponin was < 0.015, ntProBNP was 196. He has been afebrile HD stable & on RA in ER. In the ER he was given atrax for anxiety, his scheduled meds (eliquis, mag ox, cell cept & tac). His port was accessed. Dr Faria would like him admitted to observation for a stress echo (exercise) if troponins remain negative, first one was drawn at 2235 (next one is due 0230, 0630).     PMH  Past Medical History:   Diagnosis Date     Alcohol abuse     Last drink in Mid-April 2014     Anemia in ESRD (end-stage renal disease) (H)      Anxiety 2008     Atrial flutter (H)      Cirrhosis (H)     S/P liver transplant     Depression      History of blood transfusion      History of transposition  of great vessels     atrial switch at age 8 months old     Hypertension      Liver transplant recipient (H)     2016     Papillary thyroid carcinoma (H)      Pneumonia 11-15-14     Renal transplant recipient     2016     Varices, esophageal (H)      PSH  Past Surgical History:   Procedure Laterality Date     ANESTHESIA CARDIOVERSION N/A 3/7/2018    Procedure: ANESTHESIA CARDIOVERSION;  Cardioversion;  Surgeon: GENERIC ANESTHESIA PROVIDER;  Location: [facility] OR     BENCH LIVER N/A 5/10/2016    Procedure: BENCH LIVER;  Surgeon: Ricky Deshpande MD;  Location: [facility] OR     BIOPSY LYMPH NODE CERVICAL Right 1/26/2017    Procedure: BIOPSY LYMPH NODE CERVICAL;  Surgeon: Beka Soto MD;  Location: [facility] OR     CARDIAC SURGERY  9-10-80     CREATE FISTULA ARTERIOVENOUS UPPER EXTREMITY Right 9/16/2014    Procedure: CREATE FISTULA ARTERIOVENOUS UPPER EXTREMITY;  Surgeon: Padmaja Eaton MD;  Location: [facility] OR     CYSTOSCOPY, REMOVE STENT(S), COMBINED Right 6/22/2016    Procedure: COMBINED CYSTOSCOPY, REMOVE STENT(S);  Surgeon: Ricky Deshpande MD;  [facility]: [facility] OR     EMBOLECTOMY UPPER EXTREMITY Right 8/17/2018    Procedure: EMBOLECTOMY UPPER EXTREMITY;  Repair Right Upper Arm Pseudo Aneursym ;  Surgeon: John Payton MD;  Location:  OR     ENT SURGERY       ESOPHAGOSCOPY, GASTROSCOPY, DUODENOSCOPY (EGD), COMBINED  5/30/2014    Procedure: COMBINED ESOPHAGOSCOPY, GASTROSCOPY, DUODENOSCOPY (EGD);  Surgeon: Guillaume Bautista MD;  Location:  GI     ESOPHAGOSCOPY, GASTROSCOPY, DUODENOSCOPY (EGD), COMBINED  9/30/14     ESOPHAGOSCOPY, GASTROSCOPY, DUODENOSCOPY (EGD), COMBINED Left 3/12/2015    Procedure: COMBINED ESOPHAGOSCOPY, GASTROSCOPY, DUODENOSCOPY (EGD);  Surgeon: Laureen Galvan MD;  Location:  GI     ESOPHAGOSCOPY, GASTROSCOPY, DUODENOSCOPY (EGD), COMBINED N/A 4/21/2016    Procedure: COMBINED ESOPHAGOSCOPY, GASTROSCOPY, DUODENOSCOPY (EGD);  Surgeon: Laureen Galvan MD;  Location:  GI      ESOPHAGOSCOPY, GASTROSCOPY, DUODENOSCOPY (EGD), COMBINED N/A 2016    Procedure: COMBINED ESOPHAGOSCOPY, GASTROSCOPY, DUODENOSCOPY (EGD);  Surgeon: Laureen Galvan MD;  Location: [facility] GI     HC OR CATH ABLATION NON-CARDIAC ENDOVASCULAR      SVT     INSERT PORT VASCULAR ACCESS N/A 4/3/2017    Procedure: INSERT PORT VASCULAR ACCESS;  Surgeon: Rajendra Jacques PA-C;  [facility]     LIGATE FISTULA ARTERIOVENOUS UPPER EXTREMITY Right 2017    Procedure: LIGATE FISTULA ARTERIOVENOUS UPPER EXTREMITY;  Revision of Right Arm Arteriovenous Fistula ;  Surgeon: Padmaja Eaton MD;  Location: [facility] OR     PERCUTANEOUS BIOPSY KIDNEY Right 2018    Procedure: PERCUTANEOUS BIOPSY KIDNEY;  Right Kidney Biopsy;  Surgeon: Yuval Khan MD;  [facility]     PICC INSERTION  14    PICC line placement 14; Removal 2014     THORACIC SURGERY  1980    Transposition great arteries, repaired at 8 months     THYROIDECTOMY Right 2017    Procedure: THYROIDECTOMY;  Bilateral Total Thyroidectomy ;  Surgeon: Beka Soto MD;  Location: [facility] OR     TRANSPLANT KIDNEY RECIPIENT  DONOR  5/10/2016    Procedure: TRANSPLANT KIDNEY RECIPIENT  DONOR;  Surgeon: Ricky Deshpande MD;  Location: [facility] OR     TRANSPLANT LIVER RECIPIENT  DONOR N/A 5/10/2016    Procedure: TRANSPLANT LIVER RECIPIENT  DONOR;  Surgeon: Ricky Deshpande MD;  Location: [facility] OR     Prior to Admission MEDICATIONS  Prior to Admission Medications   Prescriptions Last Dose Informant Patient Reported? Taking?   amLODIPine (NORVASC) 10 MG tablet   No No   Sig: TAKE ONE TABLET BY MOUTH EVERY DAY   apixaban ANTICOAGULANT (ELIQUIS) 5 MG tablet   No No   Sig: Take 1 tablet (5 mg) by mouth 2 times daily   desonide (DESOWEN) 0.05 % ointment   No No   Sig: Apply topically 2 times daily   ketoconazole (NIZORAL) 2 % cream   No No   Sig: Twice daily to areas of rash on face   ketoconazole (NIZORAL) 2 %  shampoo   No No   Sig: Use as a foaming face wash in shower daily   levothyroxine (SYNTHROID/LEVOTHROID) 137 MCG tablet   No No   Sig: Take 1 tablet by mouth Monday - Saturday  and 1.5 tablets on    lisinopril (PRINIVIL/ZESTRIL) 5 MG tablet   No No   Sig: TAKE ONE TABLET BY MOUTH EVERY DAY   magnesium oxide (MAG-OX) 400 MG tablet   No No   Sig: TAKE ONE TABLET BY MOUTH TWICE A DAY   metoprolol succinate (TOPROL-XL) 25 MG 24 hr tablet   No No   Sig: Take 1 tablet (25 mg) by mouth daily   mycophenolate (GENERIC EQUIVALENT) 250 MG capsule   No No   Sig: Take 1 capsule (250 mg) by mouth 2 times daily   sulfamethoxazole-trimethoprim (BACTRIM/SEPTRA) 400-80 MG per tablet   No No   Sig: Take 1 tablet by mouth daily   tacrolimus (GENERIC EQUIVALENT) 1 MG capsule   No No   Si.5 mg every 12 hours      Facility-Administered Medications: None     Allergies   Allergies   Allergen Reactions     Hydromorphone Itching     Social History  Social History     Socioeconomic History     Marital status: Single     Spouse name: Not on file     Number of children: 0     Years of education: Not on file     Highest education level: Not on file   Social Needs     Financial resource strain: Not on file     Food insecurity - worry: Not on file     Food insecurity - inability: Not on file     Transportation needs - medical: Not on file     Transportation needs - non-medical: Not on file   Occupational History     Employer: UNEMPLOYED   Tobacco Use     Smoking status: Former Smoker     Packs/day: 0.33     Years: 3.00     Pack years: 0.99     Types: Cigarettes     Start date: 1997     Last attempt to quit: 2000     Years since quittin.3     Smokeless tobacco: Former User   Substance and Sexual Activity     Alcohol use: No     Alcohol/week: 0.0 oz     Comment: ~10 drinks per day for ten years, quit in 2014     Drug use: No     Sexual activity: Not Currently     Partners: Female     Birth control/protection: None    Other Topics Concern     Parent/sibling w/ CABG, MI or angioplasty before 65F 55M? Not Asked   Social History Narrative    ? Blood transfusion as baby during surgery,    Professional performed tattoos     No Illicit IV or intranasal drug use.      Family History  Family History   Problem Relation Age of Onset     Hypertension Brother      Hypertension Sister      Arthritis Father      Hypertension Father      Hypertension Mother      Hypertension Sister      Alcohol/Drug No family hx of      Gastrointestinal Disease No family hx of         no fam hx of liver disease or liver cancer     ROS  ROS negative unless otherwise noted in above HPI.    Physical Exam  Temp:  [98.2  F (36.8  C)] 98.2  F (36.8  C)  Pulse:  [66-70] 66  Heart Rate:  [83] 83  Resp:  [16] 16  BP: (117-140)/(75-87) 118/75  SpO2:  [99 %] 99 %  200 lbs 0 oz    Constitutional: Awake, alert, cooperative, in NAD. Non-toxic appearing. Calm. Telemetry shows NSR.  Eyes: PERRL, EOMI, sclera clear, conjunctiva normal. Slight redness near R eye brow.   ENT: Normocephalic, without obvious abnormality, sinuses nontender on palpation, oral pharynx with moist mucus membranes.  Respiratory: Non-labored breathing, good air exchange, clear to auscultation bilaterally, no crackles or wheezing.  Cardiovascular: RRR, no murmur noted.  GI: + bowel sounds, soft, non-distended, non-tender.  Skin: No concerning lesions or rash on exposed areas.  Musculoskeletal: trace ankle edema, 2+ pulses pedal. Arms & legs are warm. Appreciate pink/red tinge to hands/fingers.   Neurologic: Awake, alert & oriented x3.  Cranial nerves II-XII are grossly intact.   Psych: appropriate affect.    DATA  Results for orders placed or performed during the hospital encounter of 01/16/19 (from the past 24 hour(s))   EKG 12-lead, tracing only   Result Value Ref Range    Interpretation ECG Click View Image link to view waveform and result    XR Chest Port 1 View    Narrative    This is a preliminary  resident report. Full report will follow.      Impression    Impression:   No acute cardiopulmonary abnormalities.   CBC with platelets differential   Result Value Ref Range    WBC 6.6 4.0 - 11.0 10e9/L    RBC Count 4.95 4.4 - 5.9 10e12/L    Hemoglobin 15.2 13.3 - 17.7 g/dL    Hematocrit 43.3 40.0 - 53.0 %    MCV 88 78 - 100 fl    MCH 30.7 26.5 - 33.0 pg    MCHC 35.1 31.5 - 36.5 g/dL    RDW 12.4 10.0 - 15.0 %    Platelet Count 160 150 - 450 10e9/L    Diff Method Automated Method     % Neutrophils 73.2 %    % Lymphocytes 17.5 %    % Monocytes 8.2 %    % Eosinophils 0.8 %    % Basophils 0.3 %    % Immature Granulocytes 0.0 %    Nucleated RBCs 0 0 /100    Absolute Neutrophil 4.8 1.6 - 8.3 10e9/L    Absolute Lymphocytes 1.2 0.8 - 5.3 10e9/L    Absolute Monocytes 0.5 0.0 - 1.3 10e9/L    Absolute Eosinophils 0.1 0.0 - 0.7 10e9/L    Absolute Basophils 0.0 0.0 - 0.2 10e9/L    Abs Immature Granulocytes 0.0 0 - 0.4 10e9/L    Absolute Nucleated RBC 0.0    Comprehensive metabolic panel   Result Value Ref Range    Sodium 140 133 - 144 mmol/L    Potassium 3.8 3.4 - 5.3 mmol/L    Chloride 105 94 - 109 mmol/L    Carbon Dioxide 25 20 - 32 mmol/L    Anion Gap 9 3 - 14 mmol/L    Glucose 88 70 - 99 mg/dL    Urea Nitrogen 14 7 - 30 mg/dL    Creatinine 1.39 (H) 0.66 - 1.25 mg/dL    GFR Estimate 64 >60 mL/min/[1.73_m2]    GFR Estimate If Black 74 >60 mL/min/[1.73_m2]    Calcium 9.1 8.5 - 10.1 mg/dL    Bilirubin Total 0.6 0.2 - 1.3 mg/dL    Albumin 4.3 3.4 - 5.0 g/dL    Protein Total 7.4 6.8 - 8.8 g/dL    Alkaline Phosphatase 89 40 - 150 U/L    ALT 24 0 - 70 U/L    AST 17 0 - 45 U/L   Troponin I   Result Value Ref Range    Troponin I ES <0.015 0.000 - 0.045 ug/L   Nt probnp inpatient (BNP)   Result Value Ref Range    N-Terminal Pro BNP Inpatient 196 0 - 450 pg/mL     *Note: Due to a large number of results and/or encounters for the requested time period, some results have not been displayed. A complete set of results can be found in  Results Review.     PCP & Hematologist/Oncologist  Physician No Ref-Primary        Patient was seen and evaluated by ER Staff during the OBS Unit stay (see separate note).  The medical decision making and plan of care was discussed with the OBS Care Team and was supervised by ER Staff.  The documentation above accurately reflects the patient's initial evaluation, care and disposition under my supervision in the OBS Unit.    Roman Howell MD, FACEP  [facility] Staff Emergency Physician

## 2019-01-17 NOTE — PLAN OF CARE
Observation goals PER UNIT ROUTINE     Comments: 1. Patient will be afebrile, hemodynamically stable, & on room air, with symptoms of chest pain adequately controlled for discharge- yes   2. Patient will complete 2 more troponin (0300 & 0700) per protocol- done   3. Patient will remain free of acute events on telemetry- yes   4. Patient will undergo exercise stress testing to r/o ACS on 1/17/19- no   5. Patient will have safe disposition planning prior to discharge- pending

## 2019-01-18 ENCOUNTER — TELEPHONE (OUTPATIENT)
Dept: FAMILY MEDICINE | Facility: CLINIC | Age: 39
End: 2019-01-18

## 2019-01-18 ENCOUNTER — OFFICE VISIT (OUTPATIENT)
Dept: GASTROENTEROLOGY | Facility: CLINIC | Age: 39
End: 2019-01-18
Attending: INTERNAL MEDICINE
Payer: COMMERCIAL

## 2019-01-18 VITALS
DIASTOLIC BLOOD PRESSURE: 85 MMHG | WEIGHT: 201.6 LBS | TEMPERATURE: 98.2 F | SYSTOLIC BLOOD PRESSURE: 138 MMHG | BODY MASS INDEX: 28.86 KG/M2 | HEIGHT: 70 IN | OXYGEN SATURATION: 98 % | HEART RATE: 80 BPM

## 2019-01-18 DIAGNOSIS — D47.Z1 POST-TRANSPLANT LYMPHOPROLIFERATIVE DISORDER (H): ICD-10-CM

## 2019-01-18 DIAGNOSIS — Z94.4 LIVER REPLACED BY TRANSPLANT (H): ICD-10-CM

## 2019-01-18 DIAGNOSIS — Z94.4 LIVER REPLACED BY TRANSPLANT (H): Primary | ICD-10-CM

## 2019-01-18 LAB
ALBUMIN SERPL-MCNC: 4.1 G/DL (ref 3.4–5)
ALP SERPL-CCNC: 90 U/L (ref 40–150)
ALT SERPL W P-5'-P-CCNC: 18 U/L (ref 0–70)
ANION GAP SERPL CALCULATED.3IONS-SCNC: 7 MMOL/L (ref 3–14)
AST SERPL W P-5'-P-CCNC: 15 U/L (ref 0–45)
BASOPHILS # BLD AUTO: 0 10E9/L (ref 0–0.2)
BASOPHILS NFR BLD AUTO: 0.6 %
BILIRUB SERPL-MCNC: 1 MG/DL (ref 0.2–1.3)
BUN SERPL-MCNC: 15 MG/DL (ref 7–30)
CALCIUM SERPL-MCNC: 9 MG/DL (ref 8.5–10.1)
CHLORIDE SERPL-SCNC: 101 MMOL/L (ref 94–109)
CO2 SERPL-SCNC: 25 MMOL/L (ref 20–32)
CREAT SERPL-MCNC: 1.47 MG/DL (ref 0.66–1.25)
DIFFERENTIAL METHOD BLD: ABNORMAL
EOSINOPHIL # BLD AUTO: 0 10E9/L (ref 0–0.7)
EOSINOPHIL NFR BLD AUTO: 0.8 %
ERYTHROCYTE [DISTWIDTH] IN BLOOD BY AUTOMATED COUNT: 12 % (ref 10–15)
GFR SERPL CREATININE-BSD FRML MDRD: 60 ML/MIN/{1.73_M2}
GLUCOSE SERPL-MCNC: 104 MG/DL (ref 70–99)
HCT VFR BLD AUTO: 45.1 % (ref 40–53)
HGB BLD-MCNC: 15.7 G/DL (ref 13.3–17.7)
IMM GRANULOCYTES # BLD: 0 10E9/L (ref 0–0.4)
IMM GRANULOCYTES NFR BLD: 0.2 %
LDH SERPL L TO P-CCNC: 157 U/L (ref 85–227)
LYMPHOCYTES # BLD AUTO: 0.7 10E9/L (ref 0.8–5.3)
LYMPHOCYTES NFR BLD AUTO: 14 %
MCH RBC QN AUTO: 30.1 PG (ref 26.5–33)
MCHC RBC AUTO-ENTMCNC: 34.8 G/DL (ref 31.5–36.5)
MCV RBC AUTO: 86 FL (ref 78–100)
MONOCYTES # BLD AUTO: 0.4 10E9/L (ref 0–1.3)
MONOCYTES NFR BLD AUTO: 8.1 %
NEUTROPHILS # BLD AUTO: 3.8 10E9/L (ref 1.6–8.3)
NEUTROPHILS NFR BLD AUTO: 76.3 %
NRBC # BLD AUTO: 0 10*3/UL
NRBC BLD AUTO-RTO: 0 /100
PLATELET # BLD AUTO: 144 10E9/L (ref 150–450)
POTASSIUM SERPL-SCNC: 4.1 MMOL/L (ref 3.4–5.3)
PROT SERPL-MCNC: 7.4 G/DL (ref 6.8–8.8)
RBC # BLD AUTO: 5.22 10E12/L (ref 4.4–5.9)
SODIUM SERPL-SCNC: 134 MMOL/L (ref 133–144)
WBC # BLD AUTO: 4.9 10E9/L (ref 4–11)

## 2019-01-18 PROCEDURE — 87799 DETECT AGENT NOS DNA QUANT: CPT

## 2019-01-18 ASSESSMENT — MIFFLIN-ST. JEOR: SCORE: 1840.7

## 2019-01-18 ASSESSMENT — PAIN SCALES - GENERAL: PAINLEVEL: NO PAIN (0)

## 2019-01-18 NOTE — TELEPHONE ENCOUNTER
This patient was discharged from [facility] on 01/17/2019.    Discharge Diagnosis: Other Chest Pain, Chest Pain, Unspecified Type    Follow-up instructions:       You were admitted to the observation unit for evaluation of your chest pain. You had no EKG changes, labs checking for heart damage were negative, your resting echo was unchanged from prior. Cardiology was consulted and recommended zio patch holter for 2 weeks and follow up with EP in one month               A follow-up visit has not been scheduled.      Please follow-up with patient.

## 2019-01-18 NOTE — TELEPHONE ENCOUNTER
This patient has not been seen in CP clinic since 2016 and that was for acute illness. Has been seen by cardiology since 2016.    Julissa Charles RN  [facility]

## 2019-01-18 NOTE — LETTER
1/18/2019        RE: Cricket Chen  [address]  [place] [address]     Dear Colleague,    Thank you for referring your patient, Cricket Chen, to the [facility]. Please see a copy of my visit note below.    [facility]  LIVER TRANSPLANT CLINIC     A/P  37 year old male s/p LKT 5/2016 for ETOH.  Liver doing very well.  Post transplant course c/b PTLD and thyroid ca. Oncology monitoring.   IS per Dr. Sidhu. No changes to medications today. I discussed with Dr. Sidhu.  Labs up to date.    Will check CMV and EBV given his symptoms. May also be anxiety related. Encouraged him to connect with PCP.  Will see back in 1 year     Laureen Galvan MD  Hepatology/ Liver Transplant  [facility]  ===================================================================  PCP: DR. David Moser        SUBJECTIVE  38 year old male s/p DDLKT 5/2016 ETOH.    Was in ED 2 d ago and stayed overnight. Thought possibly 2/2 panic attack but not sure. He went in because he felt SOB and shaky. Thought he might be in afib as he has in the past. All cardiac eval was normal. Wearing a holter. No fevers. For the last week he has felt cold and shaky.Occasional diarrhea. NO N/V.     EXPLANT: No HCC  IS: Prograf 3-5 and MMF, pred 5 (per Dr. Sidhu)  LABS: Up to date, excellent liver function.    Recent Labs   Lab Test 01/16/19  2235   PROTTOTAL 7.4   ALBUMIN 4.3   BILITOTAL 0.6   ALKPHOS 89   AST 17   ALT 24     REJECTION: None.  BILIARY ISSUES: None  STENT: Out  KIDNEY FUNCTION:   Creatinine   Date Value Ref Range Status   01/16/2019 1.39 (H) 0.66 - 1.25 mg/dL Final     BP: Good. Lisinopril, amlodipine  PREV:  No derm done. Flu shot done this year.  DISEASE RECURRENCE: Sober since prior to transplant  OTHER ISSUES:   PTLD last EBV March 2017 undetectable  Thyroid cancer, s/p thyroidectomy last surveillance July negative  Weight gain.   Had fistula  out.     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 Would like to get back to playing hockey but knows he needs to lose weight.  ROS: 10 point ROS neg other than the symptoms noted above in the HPI.     OBJECTIVE    GENERAL:  Very pleasant, well-appearing, in no acute distress.    HEENT:  No icterus, no oral lesions.    LYMPH:  No supraclavicular or cervical lymphadenopathy.    CARDIOVASCULAR:  Regular rate and rhythm.    CHEST:  Lungs are clear.    ABDOMEN:  Bowel sounds are present.  Abdomen is soft, nontender, nondistended.  Scar is well healed.      EXTREMITIES:  No edema.    SKIN:  No rash.  Multiple tattoos  NEUROLOGIC:  Speech is fluent and clear.  No asterixis or tremor.         Again, thank you for allowing me to participate in the care of your patient.      Sincerely,    Laureen Galvan MD

## 2019-01-18 NOTE — PROGRESS NOTES
Patient discharged home. PIV removed. Patient has all belongings, understands and agrees with discharge instructions. Declined transport and will ambulate independently to discharge pharmacy and ángel hendrix.

## 2019-01-18 NOTE — NURSING NOTE
"Chief Complaint   Patient presents with     RECHECK     liver [facility] F/U     chest pains      Vital signs:  Temp: 98.2  F (36.8  C) Temp src: Oral BP: 138/85 Pulse: 80     SpO2: 98 %     Height: 177.8 cm (5' 10\") Weight: 91.4 kg (201 lb 9.6 oz)  Estimated body mass index is 28.93 kg/m  as calculated from the following:    Height as of this encounter: 1.778 m (5' 10\").    Weight as of this encounter: 91.4 kg (201 lb 9.6 oz).        Aleah Ureña, SUSHANT    "

## 2019-01-18 NOTE — TELEPHONE ENCOUNTER
ED / Discharge Outreach Protocol    Patient Contact    Attempt # 0    Was call answered?  Patient is being seen my N.  He is a transplant patient as well. Outreach by CC.  Isabel Harper RN

## 2019-01-19 LAB
CMV DNA SPEC NAA+PROBE-ACNC: NORMAL [IU]/ML
CMV DNA SPEC NAA+PROBE-LOG#: NORMAL {LOG_IU}/ML
SPECIMEN SOURCE: NORMAL

## 2019-01-21 ENCOUNTER — OFFICE VISIT (OUTPATIENT)
Dept: FAMILY MEDICINE | Facility: CLINIC | Age: 39
End: 2019-01-21
Payer: COMMERCIAL

## 2019-01-21 ENCOUNTER — ANESTHESIA EVENT (OUTPATIENT)
Dept: SURGERY | Facility: AMBULATORY SURGERY CENTER | Age: 39
End: 2019-01-21

## 2019-01-21 VITALS
SYSTOLIC BLOOD PRESSURE: 123 MMHG | HEART RATE: 71 BPM | RESPIRATION RATE: 16 BRPM | TEMPERATURE: 98.3 F | OXYGEN SATURATION: 97 % | BODY MASS INDEX: 28.84 KG/M2 | WEIGHT: 201 LBS | DIASTOLIC BLOOD PRESSURE: 82 MMHG

## 2019-01-21 DIAGNOSIS — F41.9 ANXIETY: Primary | ICD-10-CM

## 2019-01-21 RX ORDER — LORAZEPAM 0.5 MG/1
0.5 TABLET ORAL 2 TIMES DAILY PRN
Qty: 30 TABLET | Refills: 0 | Status: SHIPPED | OUTPATIENT
Start: 2019-01-21 | End: 2019-10-30

## 2019-01-21 RX ORDER — LORAZEPAM 0.5 MG/1
0.5 TABLET ORAL 2 TIMES DAILY PRN
Qty: 30 TABLET | Refills: 0 | Status: SHIPPED | OUTPATIENT
Start: 2019-01-21 | End: 2019-01-21

## 2019-01-21 ASSESSMENT — ANXIETY QUESTIONNAIRES
5. BEING SO RESTLESS THAT IT IS HARD TO SIT STILL: NEARLY EVERY DAY
7. FEELING AFRAID AS IF SOMETHING AWFUL MIGHT HAPPEN: NEARLY EVERY DAY
6. BECOMING EASILY ANNOYED OR IRRITABLE: SEVERAL DAYS
3. WORRYING TOO MUCH ABOUT DIFFERENT THINGS: NEARLY EVERY DAY
2. NOT BEING ABLE TO STOP OR CONTROL WORRYING: NEARLY EVERY DAY
1. FEELING NERVOUS, ANXIOUS, OR ON EDGE: NEARLY EVERY DAY
4. TROUBLE RELAXING: NEARLY EVERY DAY
GAD7 TOTAL SCORE: 19
7. FEELING AFRAID AS IF SOMETHING AWFUL MIGHT HAPPEN: NEARLY EVERY DAY
GAD7 TOTAL SCORE: 19

## 2019-01-21 ASSESSMENT — PAIN SCALES - GENERAL: PAINLEVEL: NO PAIN (0)

## 2019-01-21 NOTE — NURSING NOTE
Chief Complaint   Patient presents with     Anxiety     Pt comes in to discuss his anxiety as well as panic attacks.         Abdullahi Prince, EMT on 1/21/2019 at 4:44 PM

## 2019-01-21 NOTE — LETTER
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     Cricket reports he has been experiencing panic attacks over the past 1.5 weeks. These panic attacks have come out of nowhere, he denies any triggers including recent increase in stress or new life event. Cricket reports that prior to the first attack, he noticed his fingers to be slightly red and was short of breath. He initially attributed this to the cold weather. These symptoms began to worsen over the next couple of days and he started to notice himself shaking uncontrollably, restless, and unable to fall asleep at night due to inability to turn his mind off. He wondered if his symptoms were related to his heart, especially considering his history of atrial flutter.     On 1/16/18 Cricket presented to the ED with chest pains where he was admitted overnight for observation. His cardiac work-up was negative. Cardiology was consulted and recommended a zio patch monitor for 2 weeks and follow-up with EP in one month. The patient was discharged home with a prescription for lorazepam as it was thought his symptoms were secondary to anxiety. He was counseled that lorazepam is not a good long term solution and he needed to follow-up outpatient with PCP. Immediately upon  "returning home from the ED, the patient experienced return of his symptoms, which resolved after taking lorazepam. The patient was given a prescription for ten 0.5 mg tablets of lorazepam, which he has been using twice daily.     Cricket reports a history of anxiety, however he hasn't had panic attacks since ~5991-9738. He was previously managed on an anti-anxiety medication, which he can't recall the name of, thinks it may have been Paxil. He was managed on Paxil for a \"couple of years\" until he started to abuse alcohol, then stopped the medication. He is interested in restarting a daily medication to gain control of his panic attacks and anxiety.     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 He is not regularly exercising at this time, states that walking is his primary form of exercise.     DEBBIE-7 SCORE 1/21/2019   Total Score 19 (severe anxiety)   Total Score 19       Past Medical History:   Diagnosis Date     Alcohol abuse     Last drink in Mid-April 2014     Anemia in ESRD (end-stage renal disease) (H)      Anxiety 2008     Atrial flutter (H) 2017     Cirrhosis (H)     S/P liver transplant     Depression      History of blood transfusion      History of transposition of great vessels     atrial switch at age 8 months old     Hypertension      Liver transplant recipient (H)     2016     Papillary thyroid carcinoma (H)      Pneumonia 11-15-14     Renal transplant recipient     2016     Varices, esophageal (H)      Current Outpatient Medications   Medication     amLODIPine (NORVASC) 10 MG tablet     apixaban ANTICOAGULANT (ELIQUIS) 5 MG tablet     desonide (DESOWEN) 0.05 % ointment     ketoconazole (NIZORAL) 2 % cream     ketoconazole (NIZORAL) 2 % shampoo     levothyroxine (SYNTHROID/LEVOTHROID) 137 MCG tablet     lisinopril (PRINIVIL/ZESTRIL) 5 MG tablet     LORazepam (ATIVAN) 0.5 MG tablet     magnesium oxide (MAG-OX) 400 MG tablet     metoprolol " succinate (TOPROL-XL) 25 MG 24 hr tablet     mycophenolate (GENERIC EQUIVALENT) 250 MG capsule     sertraline (ZOLOFT) 50 MG tablet     sulfamethoxazole-trimethoprim (BACTRIM/SEPTRA) 400-80 MG per tablet     tacrolimus (GENERIC EQUIVALENT) 1 MG capsule     No current facility-administered medications for this visit.      Allergies   Allergen Reactions     Hydromorphone Itching       Problem, Medication and Allergy Lists were reviewed and updated if needed..    Patient is a new patient to this clinic and so  I reviewed/updated the Past Medical History, the Family History and the Social History .         Review of Systems:   Review of Systems     ROS: 10 point ROS neg other than the symptoms noted above in the HPI.         Physical Exam:     Vitals:    01/21/19 1644   BP: 123/82   BP Location: Left arm   Patient Position: Sitting   Cuff Size: Adult Regular   Pulse: 71   Resp: 16   Temp: 98.3  F (36.8  C)   TempSrc: Oral   SpO2: 97%   Weight: 91.2 kg (201 lb)     Body mass index is 28.84 kg/m .  Vitals were reviewed and were normal.     Physical Exam   Constitutional: He is oriented to person, place, and time. He appears well-developed and well-nourished. No distress.   Cardiovascular: Normal rate, regular rhythm and normal heart sounds. Exam reveals no gallop and no friction rub.   No murmur heard.  Pulmonary/Chest: Effort normal and breath sounds normal. No stridor. No respiratory distress. He has no wheezes. He has no rales.   Neurological: He is alert and oriented to person, place, and time.   Skin: Skin is warm and dry.   Psychiatric: He has a normal mood and affect. His behavior is normal.       Results:     No laboratory testing was completed at today's visit.    Assessment and Plan      1. Anxiety  Comment: Pt with hx of anxiety and recent visit to ED with chest pain. Cardiac work-up in ED was negative and pt was discharged home with prescription for lorazepam as it was thought his symptoms were secondary to  panic attack. He presents today for follow-up and is interested in starting a long term medication for management of his anxiety.     Pt is currently on long-term anticoagulation with apixaban for hx of a-flutter and is also taking tacrolimus s/p solid organ transplant. SSRI/SNRI would certainly be first choice for management of pt's anxiety symptoms, however there is increased risk for bleeding with concomitant SSRI/SNRI and apixaban. Additionally, pt is already at increased risk of bleeding while on tacrolimus and apixaban and his most recent platelets were 140 (L).     Given these facts, I feel the risk of bleeding outweighs benefit when considering SSRI/SNRI. After discussion with PharmD, I recommend starting buspirone 5 mg bid with increased titration every 3 days (as below) until therapeutic dose is reached. Lorazepam is certainly not a long term solution for management of panic, however will re-prescribe the medication for 30 days until therapeutic dose of buspirone is obtained. May consider hydroxyzine prn for breakthrough anxiety in the future.     Plan:   - LORazepam (ATIVAN) 0.5 MG tablet; Take 1 tablet (0.5 mg) by mouth 2 times daily as needed (for panic attacks)  Dispense: 30 tablet; Refill: 0   - busPIRone (BUSPAR) 5 MG tablet: Start at 5 mg twice daily for 3 days, then 7.5 mg (1.5 tabs) twice daily for 3 days, then 10 mg (2 tabs) twice daily for 3 days, then 12.5 mg (2.5 tabs) twice daily for 3 days, then 15 mg (3 tabs) twice daily and stay at that dose, Disp-150 tablet, R-0   - Counseled patient on titration instructions for Buspar as indicated above   - Discussed possible adverse side effects   - Counseled on expected length of time for therapeutic response to medication   - Follow-up in 1 month, sooner if concerns    Follow-up: Return to clinic in 1 month for anxiety follow-up.        Medications Discontinued During This Encounter   Medication Reason     LORazepam (ATIVAN) 0.5 MG tablet Therapy  completed     LORazepam (ATIVAN) 0.5 MG tablet      sertraline (ZOLOFT) 50 MG tablet Contraindicated/pregnancy     Options for treatment and follow-up care were reviewed with the patient. Cricket Chen  engaged in the decision making process and verbalized understanding of the options discussed and agreed with the final plan.    DARYN Uriostegui CNP    Answers for HPI/ROS submitted by the patient on 1/21/2019   DEBBIE 7 TOTAL SCORE: 19      DARYN Uriostegui CNP

## 2019-01-21 NOTE — PROGRESS NOTES
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     Cricket reports he has been experiencing panic attacks over the past 1.5 weeks. These panic attacks have come out of nowhere, he denies any triggers including recent increase in stress or new life event. Cricket reports that prior to the first attack, he noticed his fingers to be slightly red and was short of breath. He initially attributed this to the cold weather. These symptoms began to worsen over the next couple of days and he started to notice himself shaking uncontrollably, restless, and unable to fall asleep at night due to inability to turn his mind off. He wondered if his symptoms were related to his heart, especially considering his history of atrial flutter.     On 1/16/18 Cricket presented to the ED with chest pains where he was admitted overnight for observation. His cardiac work-up was negative. Cardiology was consulted and recommended a zio patch monitor for 2 weeks and follow-up with EP in one month. The patient was discharged home with a prescription for lorazepam as it was thought his symptoms were secondary to anxiety. He was counseled that lorazepam is not a good long term solution and he needed to follow-up outpatient with PCP. Immediately upon returning home from the ED, the patient experienced return of his symptoms, which  "resolved after taking lorazepam. The patient was given a prescription for ten 0.5 mg tablets of lorazepam, which he has been using twice daily.     Cricket reports a history of anxiety, however he hasn't had panic attacks since ~5202-1909. He was previously managed on an anti-anxiety medication, which he can't recall the name of, thinks it may have been Paxil. He was managed on Paxil for a \"couple of years\" until he started to abuse alcohol, then stopped the medication. He is interested in restarting a daily medication to gain control of his panic attacks and anxiety.     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 He is not regularly exercising at this time, states that walking is his primary form of exercise.     DEBBIE-7 SCORE 1/21/2019   Total Score 19 (severe anxiety)   Total Score 19       Past Medical History:   Diagnosis Date     Alcohol abuse     Last drink in Mid-April 2014     Anemia in ESRD (end-stage renal disease) (H)      Anxiety 2008     Atrial flutter (H) 2017     Cirrhosis (H)     S/P liver transplant     Depression      History of blood transfusion      History of transposition of great vessels     atrial switch at age 8 months old     Hypertension      Liver transplant recipient (H)     2016     Papillary thyroid carcinoma (H)      Pneumonia 11-15-14     Renal transplant recipient     2016     Varices, esophageal (H)      Current Outpatient Medications   Medication     amLODIPine (NORVASC) 10 MG tablet     apixaban ANTICOAGULANT (ELIQUIS) 5 MG tablet     desonide (DESOWEN) 0.05 % ointment     ketoconazole (NIZORAL) 2 % cream     ketoconazole (NIZORAL) 2 % shampoo     levothyroxine (SYNTHROID/LEVOTHROID) 137 MCG tablet     lisinopril (PRINIVIL/ZESTRIL) 5 MG tablet     LORazepam (ATIVAN) 0.5 MG tablet     magnesium oxide (MAG-OX) 400 MG tablet     metoprolol succinate (TOPROL-XL) 25 MG 24 hr tablet     mycophenolate (GENERIC EQUIVALENT) 250 " MG capsule     sertraline (ZOLOFT) 50 MG tablet     sulfamethoxazole-trimethoprim (BACTRIM/SEPTRA) 400-80 MG per tablet     tacrolimus (GENERIC EQUIVALENT) 1 MG capsule     No current facility-administered medications for this visit.      Allergies   Allergen Reactions     Hydromorphone Itching       Problem, Medication and Allergy Lists were reviewed and updated if needed..    Patient is a new patient to this clinic and so  I reviewed/updated the Past Medical History, the Family History and the Social History .         Review of Systems:   Review of Systems     ROS: 10 point ROS neg other than the symptoms noted above in the HPI.         Physical Exam:     Vitals:    01/21/19 1644   BP: 123/82   BP Location: Left arm   Patient Position: Sitting   Cuff Size: Adult Regular   Pulse: 71   Resp: 16   Temp: 98.3  F (36.8  C)   TempSrc: Oral   SpO2: 97%   Weight: 91.2 kg (201 lb)     Body mass index is 28.84 kg/m .  Vitals were reviewed and were normal.     Physical Exam   Constitutional: He is oriented to person, place, and time. He appears well-developed and well-nourished. No distress.   Cardiovascular: Normal rate, regular rhythm and normal heart sounds. Exam reveals no gallop and no friction rub.   No murmur heard.  Pulmonary/Chest: Effort normal and breath sounds normal. No stridor. No respiratory distress. He has no wheezes. He has no rales.   Neurological: He is alert and oriented to person, place, and time.   Skin: Skin is warm and dry.   Psychiatric: He has a normal mood and affect. His behavior is normal.       Results:     No laboratory testing was completed at today's visit.    Assessment and Plan      1. Anxiety  Comment: Pt with hx of anxiety and recent visit to ED with chest pain. Cardiac work-up in ED was negative and pt was discharged home with prescription for lorazepam as it was thought his symptoms were secondary to panic attack. He presents today for follow-up and is interested in starting a long  term medication for management of his anxiety.     Pt is currently on long-term anticoagulation with apixaban for hx of a-flutter and is also taking tacrolimus s/p solid organ transplant. SSRI/SNRI would certainly be first choice for management of pt's anxiety symptoms, however there is increased risk for bleeding with concomitant SSRI/SNRI and apixaban. Additionally, pt is already at increased risk of bleeding while on tacrolimus and apixaban and his most recent platelets were 140 (L).     Given these facts, I feel the risk of bleeding outweighs benefit when considering SSRI/SNRI. After discussion with PharmD, I recommend starting buspirone 5 mg bid with increased titration every 3 days (as below) until therapeutic dose is reached. Lorazepam is certainly not a long term solution for management of panic, however will re-prescribe the medication for 30 days until therapeutic dose of buspirone is obtained. May consider hydroxyzine prn for breakthrough anxiety in the future.     Plan:   - LORazepam (ATIVAN) 0.5 MG tablet; Take 1 tablet (0.5 mg) by mouth 2 times daily as needed (for panic attacks)  Dispense: 30 tablet; Refill: 0   - busPIRone (BUSPAR) 5 MG tablet: Start at 5 mg twice daily for 3 days, then 7.5 mg (1.5 tabs) twice daily for 3 days, then 10 mg (2 tabs) twice daily for 3 days, then 12.5 mg (2.5 tabs) twice daily for 3 days, then 15 mg (3 tabs) twice daily and stay at that dose, Disp-150 tablet, R-0   - Counseled patient on titration instructions for Buspar as indicated above   - Discussed possible adverse side effects   - Counseled on expected length of time for therapeutic response to medication   - Follow-up in 1 month, sooner if concerns    Follow-up: Return to clinic in 1 month for anxiety follow-up.        Medications Discontinued During This Encounter   Medication Reason     LORazepam (ATIVAN) 0.5 MG tablet Therapy completed     LORazepam (ATIVAN) 0.5 MG tablet      sertraline (ZOLOFT) 50 MG tablet  Contraindicated/pregnancy     Options for treatment and follow-up care were reviewed with the patient. Cricket Chen  engaged in the decision making process and verbalized understanding of the options discussed and agreed with the final plan.    DARYN Uriostegui CNP    Answers for HPI/ROS submitted by the patient on 1/21/2019   DEBBIE 7 TOTAL SCORE: 19

## 2019-01-21 NOTE — PATIENT INSTRUCTIONS
[facility] Medication Refill Request Information:  * Please contact your pharmacy regarding ANY request for medication refills.  ** [facility] Prescription Fax = [phone]  * Please allow 3 business days for routine medication refills.  * Please allow 5 business days for controlled substance medication refills.     [facility] Test Result notification information:  *You will be notified with in 7-10 days of your appointment day regarding the results of your test.  If you are on MyChart you will be notified as soon as the provider has reviewed the results and signed off on them.    [facility]: [phone]     1) Start sertraline today. Begin by taking 25 mg daily (0.5 tablets) for 1-2 weeks. Then increase to 50 mg daily (1 tablet).   2) Return to clinic in 4 weeks for follow-up.   3) Use the lorazepam (Ativan) judiciously as this will be a short term prescription.   4) Please contact me if you have any concerns.     Most common side effects of sertraline:  >10%:  Central nervous system: Insomnia (20%), dizziness (12%), fatigue (12%), drowsiness (11%)  Gastrointestinal: Nausea (26%), diarrhea (20%), xerostomia (14%), Hyperhidrosis (7%)

## 2019-01-22 ENCOUNTER — CARE COORDINATION (OUTPATIENT)
Dept: CARDIOLOGY | Facility: CLINIC | Age: 39
End: 2019-01-22

## 2019-01-22 ENCOUNTER — ANCILLARY PROCEDURE (OUTPATIENT)
Dept: RADIOLOGY | Facility: AMBULATORY SURGERY CENTER | Age: 39
End: 2019-01-22
Payer: COMMERCIAL

## 2019-01-22 ENCOUNTER — HOSPITAL ENCOUNTER (OUTPATIENT)
Facility: AMBULATORY SURGERY CENTER | Age: 39
End: 2019-01-22
Attending: PHYSICIAN ASSISTANT
Payer: COMMERCIAL

## 2019-01-22 ENCOUNTER — ANESTHESIA (OUTPATIENT)
Dept: SURGERY | Facility: AMBULATORY SURGERY CENTER | Age: 39
End: 2019-01-22

## 2019-01-22 VITALS
HEIGHT: 70 IN | SYSTOLIC BLOOD PRESSURE: 102 MMHG | TEMPERATURE: 97.7 F | OXYGEN SATURATION: 100 % | HEART RATE: 58 BPM | BODY MASS INDEX: 28.63 KG/M2 | DIASTOLIC BLOOD PRESSURE: 53 MMHG | RESPIRATION RATE: 16 BRPM | WEIGHT: 200 LBS

## 2019-01-22 DIAGNOSIS — D47.Z1 POST-TRANSPLANT LYMPHOPROLIFERATIVE DISORDER (H): ICD-10-CM

## 2019-01-22 LAB
EBV DNA # SPEC NAA+PROBE: NORMAL {COPIES}/ML
EBV DNA SPEC NAA+PROBE-LOG#: NORMAL {LOG_COPIES}/ML
ERYTHROCYTE [DISTWIDTH] IN BLOOD BY AUTOMATED COUNT: 12.2 % (ref 10–15)
HCT VFR BLD AUTO: 42.9 % (ref 40–53)
HGB BLD-MCNC: 15 G/DL (ref 13.3–17.7)
INR PPP: 1.09 (ref 0.86–1.14)
MCH RBC QN AUTO: 29.7 PG (ref 26.5–33)
MCHC RBC AUTO-ENTMCNC: 35 G/DL (ref 31.5–36.5)
MCV RBC AUTO: 85 FL (ref 78–100)
PLATELET # BLD AUTO: 149 10E9/L (ref 150–450)
RBC # BLD AUTO: 5.05 10E12/L (ref 4.4–5.9)
WBC # BLD AUTO: 4.5 10E9/L (ref 4–11)

## 2019-01-22 RX ORDER — LIDOCAINE 40 MG/G
CREAM TOPICAL
Status: DISCONTINUED | OUTPATIENT
Start: 2019-01-22 | End: 2019-01-23 | Stop reason: HOSPADM

## 2019-01-22 RX ORDER — PROPOFOL 10 MG/ML
INJECTION, EMULSION INTRAVENOUS CONTINUOUS PRN
Status: DISCONTINUED | OUTPATIENT
Start: 2019-01-22 | End: 2019-01-22

## 2019-01-22 RX ORDER — SODIUM CHLORIDE, SODIUM LACTATE, POTASSIUM CHLORIDE, CALCIUM CHLORIDE 600; 310; 30; 20 MG/100ML; MG/100ML; MG/100ML; MG/100ML
500 INJECTION, SOLUTION INTRAVENOUS CONTINUOUS
Status: DISCONTINUED | OUTPATIENT
Start: 2019-01-22 | End: 2019-01-23 | Stop reason: HOSPADM

## 2019-01-22 RX ORDER — MEPERIDINE HYDROCHLORIDE 25 MG/ML
12.5 INJECTION INTRAMUSCULAR; INTRAVENOUS; SUBCUTANEOUS
Status: DISCONTINUED | OUTPATIENT
Start: 2019-01-22 | End: 2019-01-23 | Stop reason: HOSPADM

## 2019-01-22 RX ORDER — SODIUM CHLORIDE, SODIUM LACTATE, POTASSIUM CHLORIDE, CALCIUM CHLORIDE 600; 310; 30; 20 MG/100ML; MG/100ML; MG/100ML; MG/100ML
INJECTION, SOLUTION INTRAVENOUS CONTINUOUS
Status: DISCONTINUED | OUTPATIENT
Start: 2019-01-22 | End: 2019-01-23 | Stop reason: HOSPADM

## 2019-01-22 RX ORDER — BUSPIRONE HYDROCHLORIDE 5 MG/1
TABLET ORAL
Qty: 150 TABLET | Refills: 0
Start: 2019-01-22 | End: 2019-03-04

## 2019-01-22 RX ORDER — ONDANSETRON 4 MG/1
4 TABLET, ORALLY DISINTEGRATING ORAL EVERY 30 MIN PRN
Status: DISCONTINUED | OUTPATIENT
Start: 2019-01-22 | End: 2019-01-23 | Stop reason: HOSPADM

## 2019-01-22 RX ORDER — NALOXONE HYDROCHLORIDE 0.4 MG/ML
.1-.4 INJECTION, SOLUTION INTRAMUSCULAR; INTRAVENOUS; SUBCUTANEOUS
Status: DISCONTINUED | OUTPATIENT
Start: 2019-01-22 | End: 2019-01-23 | Stop reason: HOSPADM

## 2019-01-22 RX ORDER — FENTANYL CITRATE 50 UG/ML
25-50 INJECTION, SOLUTION INTRAMUSCULAR; INTRAVENOUS
Status: DISCONTINUED | OUTPATIENT
Start: 2019-01-22 | End: 2019-01-23 | Stop reason: HOSPADM

## 2019-01-22 RX ORDER — NICOTINE POLACRILEX 4 MG
15-30 LOZENGE BUCCAL
Status: DISCONTINUED | OUTPATIENT
Start: 2019-01-22 | End: 2019-01-23 | Stop reason: HOSPADM

## 2019-01-22 RX ORDER — SODIUM CHLORIDE 9 MG/ML
INJECTION, SOLUTION INTRAVENOUS CONTINUOUS
Status: DISCONTINUED | OUTPATIENT
Start: 2019-01-22 | End: 2019-01-23 | Stop reason: HOSPADM

## 2019-01-22 RX ORDER — DEXTROSE MONOHYDRATE 25 G/50ML
25-50 INJECTION, SOLUTION INTRAVENOUS
Status: DISCONTINUED | OUTPATIENT
Start: 2019-01-22 | End: 2019-01-23 | Stop reason: HOSPADM

## 2019-01-22 RX ORDER — GABAPENTIN 300 MG/1
300 CAPSULE ORAL ONCE
Status: DISCONTINUED | OUTPATIENT
Start: 2019-01-22 | End: 2019-01-23 | Stop reason: HOSPADM

## 2019-01-22 RX ORDER — ONDANSETRON 2 MG/ML
4 INJECTION INTRAMUSCULAR; INTRAVENOUS EVERY 30 MIN PRN
Status: DISCONTINUED | OUTPATIENT
Start: 2019-01-22 | End: 2019-01-23 | Stop reason: HOSPADM

## 2019-01-22 RX ORDER — LIDOCAINE HYDROCHLORIDE 20 MG/ML
INJECTION, SOLUTION INFILTRATION; PERINEURAL PRN
Status: DISCONTINUED | OUTPATIENT
Start: 2019-01-22 | End: 2019-01-22

## 2019-01-22 RX ORDER — ACETAMINOPHEN 325 MG/1
975 TABLET ORAL ONCE
Status: DISCONTINUED | OUTPATIENT
Start: 2019-01-22 | End: 2019-01-23 | Stop reason: HOSPADM

## 2019-01-22 RX ADMIN — PROPOFOL 125 MCG/KG/MIN: 10 INJECTION, EMULSION INTRAVENOUS at 11:14

## 2019-01-22 RX ADMIN — LIDOCAINE HYDROCHLORIDE 60 MG: 20 INJECTION, SOLUTION INFILTRATION; PERINEURAL at 11:12

## 2019-01-22 RX ADMIN — SODIUM CHLORIDE, SODIUM LACTATE, POTASSIUM CHLORIDE, CALCIUM CHLORIDE 500 ML: 600; 310; 30; 20 INJECTION, SOLUTION INTRAVENOUS at 10:17

## 2019-01-22 ASSESSMENT — MIFFLIN-ST. JEOR: SCORE: 1833.44

## 2019-01-22 ASSESSMENT — ENCOUNTER SYMPTOMS: DYSRHYTHMIAS: 1

## 2019-01-22 ASSESSMENT — LIFESTYLE VARIABLES: TOBACCO_USE: 1

## 2019-01-22 ASSESSMENT — ANXIETY QUESTIONNAIRES: GAD7 TOTAL SCORE: 19

## 2019-01-22 NOTE — ANESTHESIA POSTPROCEDURE EVALUATION
Anesthesia POST Procedure Evaluation    Patient: Cricket Chen   MRN:     [id number] Gender:   male   Age:    38 year old :      1980        Preoperative Diagnosis: Post Transplant, Lymphoproliferative Disorder   Procedure(s):  Right chest Port Removal   Postop Comments: No value filed.       Anesthesia Type:  MAC    Reportable Event: NO     PAIN: Uncomplicated   Sign Out status: Comfortable, Well controlled pain     PONV: No PONV   Sign Out status:  No Nausea or Vomiting     Neuro/Psych: Uneventful perioperative course   Sign Out Status: Preoperative baseline; Age appropriate mentation     Airway/Resp.: Uneventful perioperative course   Sign Out Status: Non labored breathing, age appropriate RR; Resp. Status within EXPECTED Parameters     CV: Uneventful perioperative course   Sign Out status: Appropriate BP and perfusion indices; Appropriate HR/Rhythm     Disposition:   Sign Out in:  Phase II  Disposition:  Home  Recovery Course: Uneventful  Follow-Up: Not required           Last Anesthesia Record Vitals:  [facility] VITALS  2019 1125 - 2019 1225      2019             NIBP:  125/70    NIBP Mean:  83    Resp Rate (observed):  3  (Abnormal)     Resp Rate (set):  10          Last PACU/Preop Vitals:  Vitals:    19 0900 19 1200 19 1215   BP: 125/80 106/69 102/53   Pulse: 71 63 58   Resp: 16 16 16   Temp: 36.9  C (98.5  F) 36.5  C (97.7  F) 36.5  C (97.7  F)   SpO2: 100% 100% 100%         Electronically Signed By: Andrea Rubio MD, 2019, 1:00 PM

## 2019-01-22 NOTE — ANESTHESIA CARE TRANSFER NOTE
Patient: Cricket Chen    Procedure(s):  Right chest Port Removal    Diagnosis: Post Transplant, Lymphoproliferative Disorder  Diagnosis Additional Information: No value filed.    Anesthesia Type:   MAC     Note:  Airway :Room Air  Patient transferred to:Phase II  Handoff Report: Identifed the Patient, Identified the Reponsible Provider, Reviewed the pertinent medical history, Discussed the surgical course, Reviewed Intra-OP anesthesia mangement and issues during anesthesia, Set expectations for post-procedure period and Allowed opportunity for questions and acknowledgement of understanding      Vitals: (Last set prior to Anesthesia Care Transfer)    [facility] VITALS  1/22/2019 1125 - 1/22/2019 1203      1/22/2019             NIBP:  125/70    NIBP Mean:  83    Resp Rate (observed): 12     Resp Rate (set):  10                Electronically Signed By: DARYN Mathews CRNA  January 22, 2019  12:03 PM

## 2019-01-22 NOTE — DISCHARGE INSTRUCTIONS
A collaboration between [facility] and [facility]  Experts in minimally invasive, targeted treatments performed using imaging guidance    Venous Access Device, Port Catheter or Tunneled Central Line Removal    Today you had your existing venous access device removed, either because it was no longer needed or because there was malfunction or infection issues.    One of our Radiology PAs performed this procedure for you today:  ? CLAUDIA Montoya, RASHI    After you go home:  - Drink plenty of fluids.  Generally 6-8 (8 ounce) glasses a day is recommended.  - Resume your regular diet unless otherwise ordered by a medical provider.  - Keep any applied tape/gauze dressings clean and dry.  Change tape/gauze dressings if they get wet or soiled.  - You may shower the following day after procedure, however cover and protect from moisture any tape/gauze dressings.  You may let water hit and run over dried skin glue, but do not scrub.  Pat the area dry after showering.  - Port removal incisions are closed with absorbable suture, meaning they do not need to be removed at a later date, and a topical skin adhesive (skin glue).  This glue will wear off in 7-14 days.  Do not remove before this time.  If 14 days have passed and residual glue is present, you may gently remove it.  - You may remove tape/gauze dressings after 5 days if the site looks closed and in the process of healing.  - Do not apply gels, lotions, or ointments to the glue site for the first 10 days as this may cause the glue to prematurely soften and fail.  - Do not perform strenuous activities or lift greater than 10 pounds for the next three days.  - If there is bleeding or oozing from the procedure site, apply firm pressure to the area for 5-10 minutes.  If the bleeding continues seek medical advice at the numbers below.  - Mild procedure site discomfort can be treated with an ice pack and  over-the-counter pain relievers.              For 24 hours after any sedation used:  - Relax and take it easy.  No strenuous activities.  - Do not drive or operate machines at home or at work.  - No alcohol consumption.  - Do not make any important or legal decisions.    Call our Interventional Radiology (IR) service if:  - If you start bleeding from the procedure site.  If you do start to bleed from the site, lie down and hold some pressure on the site.  Our radiology provider can help you decide if you need to return to the hospital.  - If you have new or worsening pain related to the procedure.  - If you have concerning swelling at the procedure site.  - If you develop persistent nausea or vomiting.  - If you develop hives or a rash or any unexplained itching.  - If you have a fever of greater than 100.5  F and chills in the first 5 days after procedure.  - Any other concerns related to your procedure.      [facility]  Interventional Radiology (IR)  [address] [place] [address]    Contact Number:  [phone]  (IR control desk)  - Monday - Friday 8:00 am - 4:30 pm    After hours for urgent concerns:  [phone]  - After 4:30 pm Monday - Friday, Weekends and Holidays.   - Ask for Interventional Radiology on-call.  Someone is available 24 hours a day.  - [facility] toll free number:  [phone]

## 2019-01-22 NOTE — ANESTHESIA PREPROCEDURE EVALUATION
Anesthesia Evaluation     . Pt has had prior anesthetic. Type: General and MAC    No history of anesthetic complications          ROS/MED HX    ENT/Pulmonary:     (+)JUAN risk factors hypertension, tobacco use, Past use 0.33 packs/day  , . .    Neurologic:  - neg neurologic ROS     Cardiovascular: Comment: H/O transposition of great vessels, s/p surgical intervention as .     (+) hypertension----. Taking blood thinners Pt has received instructions: . . . :. dysrhythmias (H/O ablation ~ and since only one cardioversion 3/7/2018.) a-flutter, . 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  Last drink . S/p Kidney and liver transplant 2016.    (+) liver disease,       Renal/Genitourinary:     (+) chronic renal disease, type: ESRD, Pt does not require dialysis, Pt has history of transplant, date: 2016,       Endo:     (+) thyroid problem hypothyroidism, .      Psychiatric:     (+) psychiatric history (H/O anxiety>>>resolved.)       Infectious Disease:  - neg infectious disease ROS       Malignancy:   (+) Malignancy History of Other  Other CA PTLD Remission status post H/O thyroid cancer, s/p thyroidectomy with Dr. Soto 17.         Other:    (+) No chance of pregnancy no H/O Chronic Pain,                   Physical Exam  Normal systems: cardiovascular and dental    Airway   Mallampati: I  TM distance: >3 FB  Neck ROM: full    Dental     Cardiovascular   Rhythm and rate: regular and normal      Pulmonary    breath sounds clear to auscultation               PAC Discussion and Assessment    ASA Classification:   Case is suitable for:   Anesthetic techniques and relevant risks discussed:   Invasive  monitoring and risk discussed:   Types:   Possibility and Risk of blood transfusion discussed:   NPO instructions given:   Additional anesthetic preparation and risks discussed:   Needs early admission to pre-op area:   Other:     PAC Resident/NP Anesthesia Assessment:                  Mid-Level Provider/Resident:   Date:   Time:     Attending Anesthesiologist Anesthesia Assessment:        Anesthesiologist:   Date:   Time:   Pass/Fail:   Disposition:     PAC Pharmacist Assessment:        Pharmacist:   Date:   Time:      Anesthesia Plan      History & Physical Review  History and physical reviewed and following examination; no interval change.    ASA Status:  3 .    NPO Status:  > 8 hours    Plan for MAC Maintenance will be TIVA.    PONV prophylaxis:  Ondansetron (or other 5HT-3)       Postoperative Care  Postoperative pain management:  IV analgesics.      Consents  Anesthetic plan, risks, benefits and alternatives discussed with:  Patient.  Use of blood products discussed: No .   .                          .    [facility] PHYSICAL EXAM    Assessment:   ASA SCORE: 3            Tobacco Use:  NO Active use of Tobacco/UNKNOWN Tobacco use status     Plan:   Anes. Type:  MAC                          PONV Management:  Adult Risk Factors:, Non-Smoker     CONSENT: Direct conversation   Plan and risks discussed with: Patient   Blood Products: Consent Deferred (Minimal Blood Loss)

## 2019-01-22 NOTE — PROCEDURES
Interventional Radiology Brief Post Procedure Note    Procedure:  IR PORT REMOVAL RIGHT [[id number]]    Proceduralist: CLAUDIA Montoya PA-C    Assistant: None    Time Out: Prior to the start of the procedure and with procedural staff participation, I verbally confirmed the patient s identity using two indicators, relevant allergies, that the procedure was appropriate and matched the consent or emergent situation, and that the correct equipment/implants were available. Immediately prior to starting the procedure I conducted the Time Out with the procedural staff and re-confirmed the patient s name, procedure, and site/side. (The Joint Commission universal protocol was followed.)  Yes        Sedation: Monitored Anesthesia Care (MAC) administered and documented by Anesthesia Care Provider    Findings: Completed removal of single lumen venous chest port via right chest. 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 Karlie.  MAC    Estimated Blood Loss: Minimal    Fluoroscopy Time:  minute(s)    SPECIMENS: None    Complications: 1. None     Condition: Stable    Plan:     Comments: See dictated procedure note for full details.    Erasmo Ziegler PA-C

## 2019-01-23 DIAGNOSIS — Z94.4 LIVER REPLACED BY TRANSPLANT (H): ICD-10-CM

## 2019-01-23 LAB
ALBUMIN SERPL-MCNC: 4.1 G/DL (ref 3.4–5)
ALP SERPL-CCNC: 76 U/L (ref 40–150)
ALT SERPL W P-5'-P-CCNC: 23 U/L (ref 0–70)
ANION GAP SERPL CALCULATED.3IONS-SCNC: 4 MMOL/L (ref 3–14)
AST SERPL W P-5'-P-CCNC: 16 U/L (ref 0–45)
BILIRUB DIRECT SERPL-MCNC: 0.1 MG/DL (ref 0–0.2)
BILIRUB SERPL-MCNC: 0.6 MG/DL (ref 0.2–1.3)
BUN SERPL-MCNC: 15 MG/DL (ref 7–30)
CALCIUM SERPL-MCNC: 9.1 MG/DL (ref 8.5–10.1)
CHLORIDE SERPL-SCNC: 105 MMOL/L (ref 94–109)
CO2 SERPL-SCNC: 27 MMOL/L (ref 20–32)
CREAT SERPL-MCNC: 1.42 MG/DL (ref 0.66–1.25)
ERYTHROCYTE [DISTWIDTH] IN BLOOD BY AUTOMATED COUNT: 12.7 % (ref 10–15)
GFR SERPL CREATININE-BSD FRML MDRD: 62 ML/MIN/{1.73_M2}
GLUCOSE SERPL-MCNC: 99 MG/DL (ref 70–99)
HCT VFR BLD AUTO: 43.2 % (ref 40–53)
HGB BLD-MCNC: 15.3 G/DL (ref 13.3–17.7)
MCH RBC QN AUTO: 30.7 PG (ref 26.5–33)
MCHC RBC AUTO-ENTMCNC: 35.4 G/DL (ref 31.5–36.5)
MCV RBC AUTO: 87 FL (ref 78–100)
PLATELET # BLD AUTO: 137 10E9/L (ref 150–450)
POTASSIUM SERPL-SCNC: 4.2 MMOL/L (ref 3.4–5.3)
PROT SERPL-MCNC: 7 G/DL (ref 6.8–8.8)
RBC # BLD AUTO: 4.99 10E12/L (ref 4.4–5.9)
SODIUM SERPL-SCNC: 136 MMOL/L (ref 133–144)
WBC # BLD AUTO: 3.9 10E9/L (ref 4–11)

## 2019-01-23 PROCEDURE — 36415 COLL VENOUS BLD VENIPUNCTURE: CPT | Performed by: INTERNAL MEDICINE

## 2019-01-23 PROCEDURE — 80076 HEPATIC FUNCTION PANEL: CPT | Performed by: INTERNAL MEDICINE

## 2019-01-23 PROCEDURE — 80197 ASSAY OF TACROLIMUS: CPT | Performed by: INTERNAL MEDICINE

## 2019-01-23 PROCEDURE — 80048 BASIC METABOLIC PNL TOTAL CA: CPT | Performed by: INTERNAL MEDICINE

## 2019-01-23 PROCEDURE — 85027 COMPLETE CBC AUTOMATED: CPT | Performed by: INTERNAL MEDICINE

## 2019-01-24 ENCOUNTER — CARE COORDINATION (OUTPATIENT)
Dept: CARDIOLOGY | Facility: CLINIC | Age: 39
End: 2019-01-24

## 2019-01-24 DIAGNOSIS — Q20.3 TGA (TRANSPOSITION OF GREAT ARTERIES): Primary | ICD-10-CM

## 2019-01-24 DIAGNOSIS — R06.02 SHORTNESS OF BREATH: ICD-10-CM

## 2019-01-24 LAB
TACROLIMUS BLD-MCNC: 4.7 UG/L (ref 5–15)
TME LAST DOSE: ABNORMAL H

## 2019-01-24 NOTE — PROGRESS NOTES
Date: 1/24/2019    Time of Call: 1:33 PM     Diagnosis:  Shortness of breath     [ TORB ] Ordering provider: Dr Francesca Alba  Order: Follow up with Dr Alba in clinic with Cardiopulmonary stress test     Order received by: Gagandeep Corado RN     Follow-up/additional notes: Patient called with recommendations from Dr Alba.  Cricket states that he understands information provided and will call with further questions or concerns.

## 2019-01-30 ENCOUNTER — TELEPHONE (OUTPATIENT)
Dept: GASTROENTEROLOGY | Facility: CLINIC | Age: 39
End: 2019-01-30

## 2019-02-06 DIAGNOSIS — Z94.4 LIVER REPLACED BY TRANSPLANT (H): ICD-10-CM

## 2019-02-06 DIAGNOSIS — Z94.0 KIDNEY TRANSPLANTED: ICD-10-CM

## 2019-02-06 LAB
ALBUMIN SERPL-MCNC: 4.2 G/DL (ref 3.4–5)
ALP SERPL-CCNC: 87 U/L (ref 40–150)
ALT SERPL W P-5'-P-CCNC: 33 U/L (ref 0–70)
ANION GAP SERPL CALCULATED.3IONS-SCNC: 6 MMOL/L (ref 3–14)
AST SERPL W P-5'-P-CCNC: 23 U/L (ref 0–45)
BILIRUB DIRECT SERPL-MCNC: 0.2 MG/DL (ref 0–0.2)
BILIRUB SERPL-MCNC: 0.7 MG/DL (ref 0.2–1.3)
BUN SERPL-MCNC: 15 MG/DL (ref 7–30)
CALCIUM SERPL-MCNC: 9 MG/DL (ref 8.5–10.1)
CHLORIDE SERPL-SCNC: 106 MMOL/L (ref 94–109)
CO2 SERPL-SCNC: 25 MMOL/L (ref 20–32)
CREAT SERPL-MCNC: 1.28 MG/DL (ref 0.66–1.25)
ERYTHROCYTE [DISTWIDTH] IN BLOOD BY AUTOMATED COUNT: 13.2 % (ref 10–15)
GFR SERPL CREATININE-BSD FRML MDRD: 70 ML/MIN/{1.73_M2}
GLUCOSE SERPL-MCNC: 103 MG/DL (ref 70–99)
HCT VFR BLD AUTO: 43.3 % (ref 40–53)
HGB BLD-MCNC: 15 G/DL (ref 13.3–17.7)
MCH RBC QN AUTO: 30.7 PG (ref 26.5–33)
MCHC RBC AUTO-ENTMCNC: 34.6 G/DL (ref 31.5–36.5)
MCV RBC AUTO: 89 FL (ref 78–100)
PLATELET # BLD AUTO: 131 10E9/L (ref 150–450)
POTASSIUM SERPL-SCNC: 4.5 MMOL/L (ref 3.4–5.3)
PROT SERPL-MCNC: 7.4 G/DL (ref 6.8–8.8)
RBC # BLD AUTO: 4.89 10E12/L (ref 4.4–5.9)
SODIUM SERPL-SCNC: 137 MMOL/L (ref 133–144)
TACROLIMUS BLD-MCNC: 5.3 UG/L (ref 5–15)
TME LAST DOSE: NORMAL H
WBC # BLD AUTO: 4.6 10E9/L (ref 4–11)

## 2019-02-06 PROCEDURE — 80076 HEPATIC FUNCTION PANEL: CPT | Performed by: INTERNAL MEDICINE

## 2019-02-06 PROCEDURE — 36415 COLL VENOUS BLD VENIPUNCTURE: CPT | Performed by: INTERNAL MEDICINE

## 2019-02-06 PROCEDURE — 87799 DETECT AGENT NOS DNA QUANT: CPT | Performed by: INTERNAL MEDICINE

## 2019-02-06 PROCEDURE — 85027 COMPLETE CBC AUTOMATED: CPT | Performed by: INTERNAL MEDICINE

## 2019-02-06 PROCEDURE — 80048 BASIC METABOLIC PNL TOTAL CA: CPT | Performed by: INTERNAL MEDICINE

## 2019-02-06 PROCEDURE — 80197 ASSAY OF TACROLIMUS: CPT | Performed by: INTERNAL MEDICINE

## 2019-02-08 LAB
BKV DNA # SPEC NAA+PROBE: 1214 COPIES/ML
BKV DNA SPEC NAA+PROBE-LOG#: 3.1 LOG COPIES/ML
SPECIMEN SOURCE: ABNORMAL

## 2019-02-08 ASSESSMENT — ANXIETY QUESTIONNAIRES
GAD7 TOTAL SCORE: 6
6. BECOMING EASILY ANNOYED OR IRRITABLE: NOT AT ALL
7. FEELING AFRAID AS IF SOMETHING AWFUL MIGHT HAPPEN: SEVERAL DAYS
2. NOT BEING ABLE TO STOP OR CONTROL WORRYING: SEVERAL DAYS
3. WORRYING TOO MUCH ABOUT DIFFERENT THINGS: SEVERAL DAYS
5. BEING SO RESTLESS THAT IT IS HARD TO SIT STILL: SEVERAL DAYS
1. FEELING NERVOUS, ANXIOUS, OR ON EDGE: SEVERAL DAYS
7. FEELING AFRAID AS IF SOMETHING AWFUL MIGHT HAPPEN: SEVERAL DAYS
GAD7 TOTAL SCORE: 6
4. TROUBLE RELAXING: SEVERAL DAYS

## 2019-02-09 ASSESSMENT — ANXIETY QUESTIONNAIRES: GAD7 TOTAL SCORE: 6

## 2019-02-14 ENCOUNTER — OFFICE VISIT (OUTPATIENT)
Dept: FAMILY MEDICINE | Facility: CLINIC | Age: 39
End: 2019-02-14
Payer: COMMERCIAL

## 2019-02-14 VITALS
SYSTOLIC BLOOD PRESSURE: 124 MMHG | TEMPERATURE: 98 F | HEART RATE: 68 BPM | HEIGHT: 71 IN | WEIGHT: 201.4 LBS | BODY MASS INDEX: 28.19 KG/M2 | DIASTOLIC BLOOD PRESSURE: 83 MMHG | OXYGEN SATURATION: 99 %

## 2019-02-14 DIAGNOSIS — F41.9 ANXIETY: Primary | ICD-10-CM

## 2019-02-14 RX ORDER — HYDROXYZINE HYDROCHLORIDE 50 MG/1
50-100 TABLET, FILM COATED ORAL
Qty: 30 TABLET | Refills: 0 | Status: SHIPPED | OUTPATIENT
Start: 2019-02-14 | End: 2019-03-04

## 2019-02-14 ASSESSMENT — PAIN SCALES - GENERAL: PAINLEVEL: NO PAIN (0)

## 2019-02-14 ASSESSMENT — MIFFLIN-ST. JEOR: SCORE: 1847.73

## 2019-02-14 NOTE — PROGRESS NOTES
HPI       Cricket Chen is a 38 year old man who presents for follow up of concern(s) listed below:    Chief Complaint   Patient presents with     Recheck Medication     Pt is here to follow up on anxiety.      Cricket was started on buspirone 5 mg bid at his last visit on 1/21/19. He was instructed on appropriate titration of the medication. He presents today for follow-up. He reports he is currently taking buspirone 7.5 mg bid. Feels his anxiety is improved since starting the buspirone. He finds that if he is experiencing anxiety, it is in the evenings, reports feeling restless and anxious. He is unsure why this is, he can't seem to find a reason for this pattern. Reports he has a hard time falling asleep at night, can't turn his brain off, ruminating. He has been taking lorazepam 0.5 mg in the evenings. Once he is asleep he stays asleep, denies disruptions. He denies symptoms of panic.     Cricket works from home and generally does all of his work during the daytime hours. He reports that he has been shoveling snow regularly but otherwise has not been exercising. He is not currently in counseling and is not interested at this time but will think about initiating in the future.      Recent PHQ-9 Scores:   No flowsheet data found.     Recent DEBBIE-7 Scores:      DEBBIE-7 SCORE 1/21/2019 2/8/2019   Total Score 19 (severe anxiety) 6 (mild anxiety)   Total Score 19 6         Past Medical History:   Diagnosis Date     Alcohol abuse     Last drink in Mid-April 2014     Anemia in ESRD (end-stage renal disease) (H)      Anxiety 2008     Atrial flutter (H) 2017     Cirrhosis (H)     S/P liver transplant     Depression      History of blood transfusion      History of transposition of great vessels     atrial switch at age 8 months old     Hypertension      Liver transplant recipient (H)     2016     Papillary thyroid carcinoma (H)      Pneumonia 11-15-14     Renal transplant recipient     2016     Varices, esophageal (H)  "     Current Outpatient Medications   Medication     amLODIPine (NORVASC) 10 MG tablet     apixaban ANTICOAGULANT (ELIQUIS) 5 MG tablet     busPIRone (BUSPAR) 5 MG tablet     desonide (DESOWEN) 0.05 % ointment     ketoconazole (NIZORAL) 2 % cream     ketoconazole (NIZORAL) 2 % shampoo     levothyroxine (SYNTHROID/LEVOTHROID) 137 MCG tablet     lisinopril (PRINIVIL/ZESTRIL) 5 MG tablet     LORazepam (ATIVAN) 0.5 MG tablet     magnesium oxide (MAG-OX) 400 MG tablet     metoprolol succinate (TOPROL-XL) 25 MG 24 hr tablet     mycophenolate (GENERIC EQUIVALENT) 250 MG capsule     sulfamethoxazole-trimethoprim (BACTRIM/SEPTRA) 400-80 MG per tablet     tacrolimus (GENERIC EQUIVALENT) 1 MG capsule     No current facility-administered medications for this visit.      Allergies   Allergen Reactions     Hydromorphone Itching     Problem, Medication and Allergy Lists were reviewed and updated if needed..    Patient is an established patient of this clinic..         Review of Systems:   ROS         ROS: 10 point ROS neg other than the symptoms noted above in the HPI.         Physical Exam:     Vitals:    02/14/19 1203   BP: 124/83   Pulse: 68   Temp: 98  F (36.7  C)   SpO2: 99%   Weight: 91.4 kg (201 lb 6.4 oz)   Height: 1.803 m (5' 11\")     Body mass index is 28.09 kg/m .  Vitals were reviewed and were normal.     Physical Exam   Constitutional: He is oriented to person, place, and time. He appears well-developed and well-nourished. No distress.   Cardiovascular: Normal rate, regular rhythm and normal heart sounds. Exam reveals no gallop and no friction rub.   No murmur heard.  Neurological: He is alert and oriented to person, place, and time.   Skin: Skin is warm and dry.   Psychiatric: He has a normal mood and affect. His behavior is normal.       Results:     No laboratory testing was completed at today's visit.    Assessment and Plan     (F41.9) Anxiety  (primary encounter diagnosis)  Comment: Improved. Pt started on " buspirone 5 mg bid at his last visit on 1/21/19. He was instructed on appropriate titration of the medication, reports he is currently taking buspirone 7.5 mg bid. If he is experiencing anxiety, it is in the evenings, reports feeling restless and anxious. Continues to take lorazepam 0.5 mg only in the evenings. Will increase buspirone to 10 mg bid and recommend starting hydroxyzine  mg at bedtime prn for use in place of lorazepam.   Plan:    - hydrOXYzine (ATARAX) 50 MG tablet, take  mg at bedtime as needed for anxiety   - Verified with pharmacy that hydroxyzine is compatible with all current medications   - Continue buspirone, increase to 10 mg bid   - Avoid use of lorazepam   - Increase exercise   - Consider counseling, will enter referral as requested   - Follow-up via Fridayt when refills are needed. If everything is going well on current dose of buspirone, will order the 10 mg tablets.     Follow-up: Pt to follow-up via Fridayt in 2-3 weeks or when refills are needed and provide status update at that time.     There are no discontinued medications.    Options for treatment and follow-up care were reviewed with the patient. Cricket Chen  engaged in the decision making process and verbalized understanding of the options discussed and agreed with the final plan.    DARYN Uriostegui CNP              Answers for HPI/ROS submitted by the patient on 2/8/2019   DEBBIE 7 TOTAL SCORE: 6

## 2019-02-14 NOTE — NURSING NOTE
Chief Complaint   Patient presents with     Recheck Medication     Pt is here to follow up on anxiety.      Chapis Leavitt [facility]  12:05 PM 2/14/2019

## 2019-02-14 NOTE — PATIENT INSTRUCTIONS
1) Increase Buspar to 10 mg (2 tabs) twice daily. When you start to get low on your medication, send my a message on MyChart to let me know how things are going. If all is well, I will prescribed the 10 mg tablets with your next refill.   2) Try to use the hydroxyzine in place of the ativan. You can take 1-2 tablets at bedtime. If this medication is going well I will give you refills.       [facility] Medication Refill Request Information:  * Please contact your pharmacy regarding ANY request for medication refills.  ** [facility] Clinic Prescription Fax = [phone]  * Please allow 3 business days for routine medication refills.  * Please allow 5 business days for controlled substance medication refills.     [facility] Test Result notification information:  *You will be notified with in 7-10 days of your appointment day regarding the results of your test.  If you are on MyChart you will be notified as soon as the provider has reviewed the results and signed off on them.    [facility]: [phone]

## 2019-02-19 ENCOUNTER — HOSPITAL ENCOUNTER (OUTPATIENT)
Dept: CARDIOLOGY | Facility: CLINIC | Age: 39
Discharge: HOME | End: 2019-02-19
Attending: INTERNAL MEDICINE | Admitting: INTERNAL MEDICINE
Payer: COMMERCIAL

## 2019-02-19 ENCOUNTER — MYC MEDICAL ADVICE (OUTPATIENT)
Dept: FAMILY MEDICINE | Facility: CLINIC | Age: 39
End: 2019-02-19

## 2019-02-19 DIAGNOSIS — Z94.4 LIVER REPLACED BY TRANSPLANT (H): ICD-10-CM

## 2019-02-19 DIAGNOSIS — Q20.3 TGA (TRANSPOSITION OF GREAT ARTERIES): ICD-10-CM

## 2019-02-19 DIAGNOSIS — F41.9 ANXIETY: Primary | ICD-10-CM

## 2019-02-19 DIAGNOSIS — R06.02 SHORTNESS OF BREATH: ICD-10-CM

## 2019-02-19 LAB
ALBUMIN SERPL-MCNC: 4.1 G/DL (ref 3.4–5)
ALP SERPL-CCNC: 118 U/L (ref 40–150)
ALT SERPL W P-5'-P-CCNC: 133 U/L (ref 0–70)
ANION GAP SERPL CALCULATED.3IONS-SCNC: 5 MMOL/L (ref 3–14)
AST SERPL W P-5'-P-CCNC: 57 U/L (ref 0–45)
BILIRUB DIRECT SERPL-MCNC: 0.2 MG/DL (ref 0–0.2)
BILIRUB SERPL-MCNC: 0.7 MG/DL (ref 0.2–1.3)
BUN SERPL-MCNC: 14 MG/DL (ref 7–30)
CALCIUM SERPL-MCNC: 9.5 MG/DL (ref 8.5–10.1)
CHLORIDE SERPL-SCNC: 108 MMOL/L (ref 94–109)
CO2 SERPL-SCNC: 28 MMOL/L (ref 20–32)
CREAT SERPL-MCNC: 1.42 MG/DL (ref 0.66–1.25)
ERYTHROCYTE [DISTWIDTH] IN BLOOD BY AUTOMATED COUNT: 13.1 % (ref 10–15)
GFR SERPL CREATININE-BSD FRML MDRD: 62 ML/MIN/{1.73_M2}
GLUCOSE SERPL-MCNC: 100 MG/DL (ref 70–99)
HCT VFR BLD AUTO: 46.7 % (ref 40–53)
HGB BLD-MCNC: 15.9 G/DL (ref 13.3–17.7)
MCH RBC QN AUTO: 30.2 PG (ref 26.5–33)
MCHC RBC AUTO-ENTMCNC: 34 G/DL (ref 31.5–36.5)
MCV RBC AUTO: 89 FL (ref 78–100)
PLATELET # BLD AUTO: 145 10E9/L (ref 150–450)
POTASSIUM SERPL-SCNC: 4.3 MMOL/L (ref 3.4–5.3)
PROT SERPL-MCNC: 7.5 G/DL (ref 6.8–8.8)
RBC # BLD AUTO: 5.26 10E12/L (ref 4.4–5.9)
SODIUM SERPL-SCNC: 141 MMOL/L (ref 133–144)
TACROLIMUS BLD-MCNC: 6.1 UG/L (ref 5–15)
TME LAST DOSE: NORMAL H
WBC # BLD AUTO: 4.5 10E9/L (ref 4–11)

## 2019-02-19 PROCEDURE — 36415 COLL VENOUS BLD VENIPUNCTURE: CPT | Performed by: INTERNAL MEDICINE

## 2019-02-19 PROCEDURE — 80197 ASSAY OF TACROLIMUS: CPT | Performed by: INTERNAL MEDICINE

## 2019-02-19 PROCEDURE — 94621 CARDIOPULM EXERCISE TESTING: CPT | Mod: 26 | Performed by: INTERNAL MEDICINE

## 2019-02-19 PROCEDURE — 80048 BASIC METABOLIC PNL TOTAL CA: CPT | Performed by: INTERNAL MEDICINE

## 2019-02-19 PROCEDURE — 85027 COMPLETE CBC AUTOMATED: CPT | Performed by: INTERNAL MEDICINE

## 2019-02-19 PROCEDURE — 94621 CARDIOPULM EXERCISE TESTING: CPT

## 2019-02-19 PROCEDURE — 80076 HEPATIC FUNCTION PANEL: CPT | Performed by: INTERNAL MEDICINE

## 2019-02-19 NOTE — TELEPHONE ENCOUNTER
Called patient to clarify the dose of buspirone he is currently taking. His max dose should be 15 mg twice daily. If he is taking more than this it could be contributing to the dizziness he is experiencing.     Patient reports he is taking two 10 mg tablets daily. He was dispensed two bottles of pills from the pharmacy, one with 5 mg tablets and one with 10 mg tablets. At our last visit he indicated he was taking 1.5 tablets of the 5 mg tablets for a total of 7.5 mg twice daily. The plan was to increase his dose to 2 tablets (10 mg) twice daily. The patient reports today that he realized he was actually taking 1.5 tablets of the 10 mg tablets and then increased to two of the 10 mg tablets following our visit. Advised patient decrease his dose. His max dose should be 15 mg twice daily. He should take a dose that does not cause him dizziness while also helping to control his anxiety.     Since the hydralazine is not effective and he is experiencing unwanted side effects (dry mouth), I would recommend he stop the medication and we refer to psychiatry as a next step in finding an alternative to lorazepam that the patient can use in the evening when his anxiety is notably increased. He reports having seven 0.5 mg tablets of lorazepam remaining and he has not been using them for sleep. He reports it takes him 1-1.5 hours to fall asleep at night without medication. Once he is asleep he stays asleep. We discussed melatonin, which he has tried in the past but it was quite awhile ago. He is willing to attempt melatonin again while we await his visit with psychiatry. We discussed appropriate doses of this.     Encouraged patient to call back if he continues to experience dizziness on the decreased dose of buspirone. Reinforced the appropriate use of lorazepam. Patient given scheduling information for psychiatry.     Lizette Miner, DNP, APRN, CNP

## 2019-02-20 ENCOUNTER — TELEPHONE (OUTPATIENT)
Dept: TRANSPLANT | Facility: CLINIC | Age: 39
End: 2019-02-20

## 2019-02-20 DIAGNOSIS — Z94.0 KIDNEY TRANSPLANTED: Primary | ICD-10-CM

## 2019-02-20 NOTE — TELEPHONE ENCOUNTER
ISSUE:   Tacrolimus level 6.1 on 2/19, goal 3-5, dose 1.5 mg BID  History of BK viremia     PLAN:   Please call pt and confirm this was a good 12-hour trough. Verify dose 1.5 mg BID. Confirm no new medications or illness (jessica. Diarrhea).   Previous levels have been within goal, no dose change at this time.  Recommend repeating tac level in 1-2 weeks with a good 12 hour trough.  If still elevated at that time will decrease dose.     OUTCOME:  Called and left a detailed v/m for patient to repeat level in 1-2 weeks.   [employer] message also sent to patient.

## 2019-02-21 ENCOUNTER — TELEPHONE (OUTPATIENT)
Dept: TRANSPLANT | Facility: CLINIC | Age: 39
End: 2019-02-21

## 2019-02-21 DIAGNOSIS — Z94.4 LIVER REPLACED BY TRANSPLANT (H): ICD-10-CM

## 2019-02-21 NOTE — TELEPHONE ENCOUNTER
Call to [facility]. Was aware that his liver function tests were somewhat elevated. Informed him that these had been reviewed by Dr. Galvan. She would like  Him to repeat the liver functions next week. Verbalized understanding of plan. Goes to [facility]. Orders placed.

## 2019-02-25 DIAGNOSIS — Z94.4 LIVER REPLACED BY TRANSPLANT (H): Primary | ICD-10-CM

## 2019-02-25 DIAGNOSIS — Z94.0 S/P KIDNEY TRANSPLANT: ICD-10-CM

## 2019-02-25 DIAGNOSIS — Z94.4 LIVER REPLACED BY TRANSPLANT (H): ICD-10-CM

## 2019-02-25 LAB
ALBUMIN SERPL-MCNC: 4.5 G/DL (ref 3.4–5)
ALP SERPL-CCNC: 104 U/L (ref 40–150)
ALT SERPL W P-5'-P-CCNC: 53 U/L (ref 0–70)
AST SERPL W P-5'-P-CCNC: 21 U/L (ref 0–45)
BILIRUB DIRECT SERPL-MCNC: 0.1 MG/DL (ref 0–0.2)
BILIRUB SERPL-MCNC: 0.4 MG/DL (ref 0.2–1.3)
PROT SERPL-MCNC: 7.8 G/DL (ref 6.8–8.8)

## 2019-02-25 PROCEDURE — 36415 COLL VENOUS BLD VENIPUNCTURE: CPT | Performed by: INTERNAL MEDICINE

## 2019-02-25 PROCEDURE — 80076 HEPATIC FUNCTION PANEL: CPT | Performed by: INTERNAL MEDICINE

## 2019-02-25 RX ORDER — SULFAMETHOXAZOLE AND TRIMETHOPRIM 400; 80 MG/1; MG/1
1 TABLET ORAL DAILY
Qty: 30 TABLET | Refills: 11 | Status: SHIPPED | OUTPATIENT
Start: 2019-02-25 | End: 2020-01-28

## 2019-03-03 ENCOUNTER — MYC MEDICAL ADVICE (OUTPATIENT)
Dept: FAMILY MEDICINE | Facility: CLINIC | Age: 39
End: 2019-03-03

## 2019-03-03 DIAGNOSIS — F41.9 ANXIETY: ICD-10-CM

## 2019-03-04 RX ORDER — HYDROXYZINE HYDROCHLORIDE 50 MG/1
50-100 TABLET, FILM COATED ORAL
Qty: 90 TABLET | Refills: 1 | Status: SHIPPED | OUTPATIENT
Start: 2019-03-04 | End: 2019-06-26

## 2019-03-04 RX ORDER — BUSPIRONE HYDROCHLORIDE 15 MG/1
TABLET ORAL
Qty: 180 TABLET | Refills: 0 | Status: SHIPPED | OUTPATIENT
Start: 2019-03-04 | End: 2019-06-06

## 2019-03-05 ENCOUNTER — CARE COORDINATION (OUTPATIENT)
Dept: CARDIOLOGY | Facility: CLINIC | Age: 39
End: 2019-03-05

## 2019-03-05 DIAGNOSIS — Q20.3 TGA (TRANSPOSITION OF GREAT ARTERIES): ICD-10-CM

## 2019-03-05 DIAGNOSIS — Z94.0 KIDNEY TRANSPLANTED: ICD-10-CM

## 2019-03-05 DIAGNOSIS — Q20.3 TGA (TRANSPOSITION OF GREAT ARTERIES): Primary | ICD-10-CM

## 2019-03-05 LAB
ANION GAP SERPL CALCULATED.3IONS-SCNC: 5 MMOL/L (ref 3–14)
BUN SERPL-MCNC: 15 MG/DL (ref 7–30)
CALCIUM SERPL-MCNC: 9 MG/DL (ref 8.5–10.1)
CHLORIDE SERPL-SCNC: 108 MMOL/L (ref 94–109)
CO2 SERPL-SCNC: 26 MMOL/L (ref 20–32)
CREAT SERPL-MCNC: 1.35 MG/DL (ref 0.66–1.25)
GFR SERPL CREATININE-BSD FRML MDRD: 66 ML/MIN/{1.73_M2}
GLUCOSE SERPL-MCNC: 121 MG/DL (ref 70–99)
POTASSIUM SERPL-SCNC: 4.7 MMOL/L (ref 3.4–5.3)
SODIUM SERPL-SCNC: 139 MMOL/L (ref 133–144)
TACROLIMUS BLD-MCNC: 6.1 UG/L (ref 5–15)
TME LAST DOSE: NORMAL H

## 2019-03-05 PROCEDURE — 80048 BASIC METABOLIC PNL TOTAL CA: CPT | Performed by: INTERNAL MEDICINE

## 2019-03-05 PROCEDURE — 80197 ASSAY OF TACROLIMUS: CPT | Performed by: INTERNAL MEDICINE

## 2019-03-05 PROCEDURE — 87799 DETECT AGENT NOS DNA QUANT: CPT | Performed by: INTERNAL MEDICINE

## 2019-03-05 PROCEDURE — 36415 COLL VENOUS BLD VENIPUNCTURE: CPT | Performed by: INTERNAL MEDICINE

## 2019-03-06 DIAGNOSIS — Z94.4 LIVER REPLACED BY TRANSPLANT (H): Primary | ICD-10-CM

## 2019-03-06 DIAGNOSIS — Z94.0 S/P KIDNEY TRANSPLANT: ICD-10-CM

## 2019-03-06 LAB
BKV DNA # SPEC NAA+PROBE: 2569 COPIES/ML
BKV DNA SPEC NAA+PROBE-LOG#: 3.4 LOG COPIES/ML
SPECIMEN SOURCE: ABNORMAL

## 2019-03-06 NOTE — TELEPHONE ENCOUNTER
ISSUE:  Repeat tac 6.1, goal 3-5 due to BK    PLAN:  Call and confirm a 12 hour trough and current tacrolimus dose of 1.5 mg BID  Any recent illness, diarrhea, or medication changes?  Recommend decreasing dose to 1 mg BID and repeating level in 1-2 weeks    LPN TASK:  Call with above instructions  Update rx/lab order

## 2019-03-07 RX ORDER — TACROLIMUS 1 MG/1
1 CAPSULE ORAL 2 TIMES DAILY
Qty: 60 CAPSULE | Refills: 11 | Status: SHIPPED | OUTPATIENT
Start: 2019-03-07 | End: 2019-03-28

## 2019-03-07 NOTE — TELEPHONE ENCOUNTER
Call placed to patient. Patient confirms current dose and accurate trough level. Denies any recent illness or medication changes. Notes that he was recently started on buspirone and hydroxyzine. Patient v\u to decrease dose to 1 mg BID and repeat level in 1-2 weeks. Order/rx sent

## 2019-03-08 ENCOUNTER — OFFICE VISIT (OUTPATIENT)
Dept: CARDIOLOGY | Facility: CLINIC | Age: 39
End: 2019-03-08
Attending: INTERNAL MEDICINE
Payer: COMMERCIAL

## 2019-03-08 ENCOUNTER — APPOINTMENT (OUTPATIENT)
Dept: LAB | Facility: CLINIC | Age: 39
End: 2019-03-08
Payer: COMMERCIAL

## 2019-03-08 VITALS
WEIGHT: 208 LBS | HEART RATE: 62 BPM | SYSTOLIC BLOOD PRESSURE: 121 MMHG | OXYGEN SATURATION: 98 % | HEIGHT: 71 IN | BODY MASS INDEX: 29.12 KG/M2 | DIASTOLIC BLOOD PRESSURE: 75 MMHG

## 2019-03-08 DIAGNOSIS — Q20.3 D-TGA (DEXTRO-TRANSPOSITION OF GREAT ARTERIES): ICD-10-CM

## 2019-03-08 DIAGNOSIS — Q20.3 COMPLETE TRANSPOSITION OF GREAT VESSELS: ICD-10-CM

## 2019-03-08 DIAGNOSIS — I48.92 ATRIAL FLUTTER, UNSPECIFIED TYPE (H): Primary | ICD-10-CM

## 2019-03-08 PROCEDURE — 99214 OFFICE O/P EST MOD 30 MIN: CPT | Mod: ZP | Performed by: INTERNAL MEDICINE

## 2019-03-08 PROCEDURE — 93010 ELECTROCARDIOGRAM REPORT: CPT | Mod: ZP | Performed by: INTERNAL MEDICINE

## 2019-03-08 PROCEDURE — 36415 COLL VENOUS BLD VENIPUNCTURE: CPT

## 2019-03-08 ASSESSMENT — MIFFLIN-ST. JEOR: SCORE: 1885.61

## 2019-03-08 ASSESSMENT — PAIN SCALES - GENERAL: PAINLEVEL: NO PAIN (0)

## 2019-03-08 NOTE — PROGRESS NOTES
[facility] Electrophysiology Clinic Note  HPI:   Mr. Chen is a 38 year old male who has a past medical history significant for dTGA s/p modified Shoemaker operation and stitch closure of small VSD 1981, AFL s/p DCCV and s/p ablation 8/2000, liver and kidney transplant 2016, post transplant lymphoproliferative disorder, papillary thyroid cancer s/p thyroidectomy, prior ETOH abuse (sober since 2014), anxiety, and depression. He presents today for follow up.     He presented to [facility] on 3/6/18 with shortness of breath and chest flutterings that started the day prior. He recalled it felt similar to when he was in AFL in the past. In the [facility], he was found to be in AFL. He was subsequently admitted. He was started on Eliquis and arranged for NEELIMA/DCCV. He had successful DCCV 3/7/18 and he was discharged. He states that he had AFL in the past. He believes he had a DCCV around age 7. He had also been maintained on Sotalol. He also reports a previous ablation at an OSH in 8/2000 (records not available to us at this time). He did well without AFL issues until 3/2018 when he had recurrence. Most recent echo from 3/30/18 showed systemic EF 35-40%, mild TR, and no evidence of baffle leaks/obstructions. A CMRI from OSH from 3/2017 showed no baffle  Obstruction and systemic EF 33%. He saw [facility] EP clinic on 4/13/2018. He was doing well. He had not noted any further arrhythmias since his DCCV. He followed up in EP clinic in 7/2018 and was doing well without recurrent AFL.     He presents today for follow up. He presented to [facility] on 1/16/19 reporting some shortness of breath and palpitations. He was in sinus on presentation to ER. He was discharged on a zio patch monitor. Zio patch from 1/17/19-1/31/19 showed SR with rare ectopy and 7 NSVT up to 10 beats. 3 symptom activations showed sinus. An echo from 1/2019 showed moderately depressed Rv function, no obvious baffle obstructions or leaks. He reports feeling well and no recurrent  palpitations. He denies any chest pain/pressures, dizziness, lightheadedness, worsening shortness of breath, leg/ankle swelling, PND, orthopnea, palpitations, or syncopal symptoms. Presenting 12 lead ECG shows SB Vent Rate 59 bpm,  ms,  ms, QTc 429 ms. Current cardiac medications include: Toprol XL, Eliquis, Lisinopril, and Norvasc.         PAST MEDICAL HISTORY:  Past Medical History:   Diagnosis Date     Alcohol abuse     Last drink in Mid-April 2014     Anemia in ESRD (end-stage renal disease) (H)      Anxiety 2008     Atrial flutter (H) 2017     Cirrhosis (H)     S/P liver transplant     Depression      History of blood transfusion      History of transposition of great vessels     atrial switch at age 8 months old     Hypertension      Liver transplant recipient (H)     2016     Papillary thyroid carcinoma (H)      Pneumonia 11-15-14     Renal transplant recipient     2016     Varices, esophageal (H)        CURRENT MEDICATIONS:  Current Outpatient Medications   Medication Sig Dispense Refill     amLODIPine (NORVASC) 10 MG tablet TAKE ONE TABLET BY MOUTH EVERY DAY 90 tablet 3     apixaban ANTICOAGULANT (ELIQUIS) 5 MG tablet Take 1 tablet (5 mg) by mouth 2 times daily 180 tablet 3     busPIRone (BUSPAR) 15 MG tablet Take one tablet (15 mg) twice daily for anxiety. 180 tablet 0     desonide (DESOWEN) 0.05 % ointment Apply topically 2 times daily 15 g 3     hydrOXYzine (ATARAX) 50 MG tablet Take 1-2 tablets ( mg) by mouth nightly as needed for anxiety 90 tablet 1     ketoconazole (NIZORAL) 2 % cream Twice daily to areas of rash on face 60 g 1     ketoconazole (NIZORAL) 2 % shampoo Use as a foaming face wash in shower daily 120 mL 3     levothyroxine (SYNTHROID/LEVOTHROID) 137 MCG tablet Take 1 tablet by mouth Monday - Saturday  and 1.5 tablets on Sunday 96 tablet 3     lisinopril (PRINIVIL/ZESTRIL) 5 MG tablet TAKE ONE TABLET BY MOUTH EVERY DAY 90 tablet 3     LORazepam (ATIVAN) 0.5 MG tablet Take  1 tablet (0.5 mg) by mouth 2 times daily as needed (for panic attacks) 30 tablet 0     magnesium oxide (MAG-OX) 400 MG tablet TAKE ONE TABLET BY MOUTH TWICE A  tablet 11     metoprolol succinate (TOPROL-XL) 25 MG 24 hr tablet Take 1 tablet (25 mg) by mouth daily 90 tablet 3     mycophenolate (GENERIC EQUIVALENT) 250 MG capsule Take 1 capsule (250 mg) by mouth 2 times daily 60 capsule 11     sulfamethoxazole-trimethoprim (BACTRIM/SEPTRA) 400-80 MG tablet Take 1 tablet by mouth daily 30 tablet 11     tacrolimus (GENERIC EQUIVALENT) 1 MG capsule Take 1 capsule (1 mg) by mouth 2 times daily 60 capsule 11       PAST SURGICAL HISTORY:  Past Surgical History:   Procedure Laterality Date     ANESTHESIA CARDIOVERSION N/A 3/7/2018    Procedure: ANESTHESIA CARDIOVERSION;  Cardioversion;  Surgeon: GENERIC ANESTHESIA PROVIDER;  Location:  OR     BENCH LIVER N/A 5/10/2016    Procedure: BENCH LIVER;  Surgeon: Ricky Deshpande MD;  Location: [facility] OR     BIOPSY LYMPH NODE CERVICAL Right 1/26/2017    Procedure: BIOPSY LYMPH NODE CERVICAL;  Surgeon: Beka Soto MD;  Location:  OR     CARDIAC SURGERY  9-10-80     CREATE FISTULA ARTERIOVENOUS UPPER EXTREMITY Right 9/16/2014    Procedure: CREATE FISTULA ARTERIOVENOUS UPPER EXTREMITY;  Surgeon: Padmaja Eaton MD;  Location: [facility] OR     CYSTOSCOPY, REMOVE STENT(S), COMBINED Right 6/22/2016    Procedure: COMBINED CYSTOSCOPY, REMOVE STENT(S);  Surgeon: Ricky Deshpande MD;  Location: [facility] OR     EMBOLECTOMY UPPER EXTREMITY Right 8/17/2018    Procedure: EMBOLECTOMY UPPER EXTREMITY;  Repair Right Upper Arm Pseudo Aneursym ;  Surgeon: John Payton MD;  Location:  OR     ENT SURGERY       ESOPHAGOSCOPY, GASTROSCOPY, DUODENOSCOPY (EGD), COMBINED  5/30/2014    Procedure: COMBINED ESOPHAGOSCOPY, GASTROSCOPY, DUODENOSCOPY (EGD);  Surgeon: Guillaume Bautista MD;  Location:  GI     ESOPHAGOSCOPY, GASTROSCOPY, DUODENOSCOPY (EGD), COMBINED  9/30/14     ESOPHAGOSCOPY,  GASTROSCOPY, DUODENOSCOPY (EGD), COMBINED Left 3/12/2015    Procedure: COMBINED ESOPHAGOSCOPY, GASTROSCOPY, DUODENOSCOPY (EGD);  Surgeon: Laureen Galvan MD;  Location: [facility] GI     ESOPHAGOSCOPY, GASTROSCOPY, DUODENOSCOPY (EGD), COMBINED N/A 2016    Procedure: COMBINED ESOPHAGOSCOPY, GASTROSCOPY, DUODENOSCOPY (EGD);  Surgeon: Laureen Galvan MD;  [facility] GI     ESOPHAGOSCOPY, GASTROSCOPY, DUODENOSCOPY (EGD), COMBINED N/A 2016    Procedure: COMBINED ESOPHAGOSCOPY, GASTROSCOPY, DUODENOSCOPY (EGD);  Surgeon: Laureen Galvan MD;  Location:  GI     HC OR CATH ABLATION NON-CARDIAC ENDOVASCULAR      SVT     INSERT PORT VASCULAR ACCESS N/A 4/3/2017    Procedure: INSERT PORT VASCULAR ACCESS;  Surgeon: Rajendra Jacques PA-C;  Location: [facility] OR     IR PORT REMOVAL LEFT  2019     LIGATE FISTULA ARTERIOVENOUS UPPER EXTREMITY Right 2017    Procedure: LIGATE FISTULA ARTERIOVENOUS UPPER EXTREMITY;  Revision of Right Arm Arteriovenous Fistula ;  Surgeon: Padmaja Eaton MD;  Location:  OR     PERCUTANEOUS BIOPSY KIDNEY Right 2018    Procedure: PERCUTANEOUS BIOPSY KIDNEY;  Right Kidney Biopsy;  Surgeon: Yuval Khan MD;  Location: [facility] OR     PICC INSERTION  14    PICC line placement 14; Removal 2014     REMOVE PORT VASCULAR ACCESS Right 2019    Procedure: Right chest Port Removal;  Surgeon: Erasmo Ziegler PA-C;  Location:  OR     THORACIC SURGERY  1980    Transposition great arteries, repaired at 8 months     THYROIDECTOMY Right 2017    Procedure: THYROIDECTOMY;  Bilateral Total Thyroidectomy ;  Surgeon: Beka Soto MD;  Location:  OR     TRANSPLANT KIDNEY RECIPIENT  DONOR  5/10/2016    Procedure: TRANSPLANT KIDNEY RECIPIENT  DONOR;  Surgeon: Ricky Deshpande MD;  Location:  OR     TRANSPLANT LIVER RECIPIENT  DONOR N/A 5/10/2016    Procedure: TRANSPLANT LIVER RECIPIENT   "DONOR;  Surgeon: Ricky Deshpande MD;  Location:  OR       ALLERGIES:     Allergies   Allergen Reactions     Hydromorphone Itching       FAMILY HISTORY:  Family History   Problem Relation Age of Onset     No Known Problems Brother      Arthritis Father      Hypertension Father      Hypertension Mother      Anxiety Disorder Mother      Hypertension Sister      No Known Problems Maternal Grandmother      No Known Problems Maternal Grandfather      No Known Problems Paternal Grandmother      No Known Problems Sister      No Known Problems Paternal Grandfather      Alcohol/Drug No family hx of      Gastrointestinal Disease No family hx of         no fam hx of liver disease or liver cancer       SOCIAL HISTORY:  Social History     Tobacco Use     Smoking status: Former Smoker     Packs/day: 0.33     Years: 3.00     Pack years: 0.99     Types: Cigarettes     Start date: 1997     Last attempt to quit: 2000     Years since quittin.4     Smokeless tobacco: Former User     Tobacco comment: Quit chewing in early    Substance Use Topics     Alcohol use: No     Alcohol/week: 0.0 oz     Comment: ~10 drinks per day for ten years, quit in 2014     Drug use: No       ROS:   A comprehensive 10 point review of systems negative other than as mentioned in HPI.  Exam:  /75 (BP Location: Left arm, Patient Position: Chair, Cuff Size: Adult Regular)   Pulse 62   Ht 1.803 m (5' 11\")   Wt 94.3 kg (208 lb)   SpO2 98%   BMI 29.01 kg/m    GENERAL APPEARANCE: alert and no distress  HEENT: no icterus, no xanthelasmas, normal pupil size and reaction, normal palate, mucosa moist, no central cyanosis  NECK: no adenopathy, no asymmetry, masses, or scars, thyroid normal to palpation and no bruits, JVP not elevated  RESPIRATORY: lungs clear to auscultation - no rales, rhonchi or wheezes, no use of accessory muscles, no retractions, respirations are unlabored, normal respiratory rate  CARDIOVASCULAR: regular " rhythm, normal S1 with physiologic split S2, no S3 or S4 and no murmur, click or rub, precordium quiet with normal PMI.  ABDOMEN: soft, non tender, bowel sounds normal, non-distended  EXTREMITIES: peripheral pulses normal, no edema  NEURO: alert and oriented to person/place/time, normal speech, gait and affect  SKIN: no ecchymoses, no rashes  PSYCH: normal affect, cooperative    Labs:  Reviewed.     Testing/Procedures:  PULMONARY FUNCTION TESTS:   PFT Latest Ref Rng & Units 10/14/2014   FVC L 4.95   FEV1 L 4.38   FVC% % 92   FEV1% % 100         3/30/18 ECHOCARDIOGRAM:   Patient after atrial switch operation for complete transposition of the great  arteries. Technically difficult study due to poor acoustic windows. No baffle  obstruction or leaks seen. There is moderate right ventricular enlargement.  Single plane right ventricular EF 35-40 %. Normal left ventricular systolic  function. No outflow obstruction. Mild tricuspid regurgitation.    3/2017 CMRI (OSH REPORT):  1. Transposition of the great arteries with native atrioventricular   concordance and ventricular arterial discordance with the aorta anterior   to the pulmonary artery (D-transposition of the great arteries).  2. Status post interatrial baffle with the superior vena cava and inferior   vena cava baffled to the left (subpulmonic) ventricle without obstruction.    There is no evidence of a baffle leak.    3. Systemic right ventricle:  a. The right ventricle is hypertrophied.  b. Decreased systolic function with an ejection fraction of 33%.  c. Severe dilation of the right ventricle with an end-diastolic volume of   208 mL/m  (previously 188 mL/m ) and end-systolic volume of 140 mL/m    (previous 123 mL/m ).  4. The right ventricle connects with the anterior aorta.  The coronary   artery origins are usual for transposition.  Left-sided aortic arch with   measurements above.  5. The pulmonary venous baffle to the right ventricle is widely patent.  6. The  subpulmonary left ventricle has normal systolic function with   decreased myocardial mass.  The left ventricle connects to the pulmonary   artery, which is posterior to the aorta.  7. No significant change from previous cardiac MRI of 9/4/2012 except an   increased size in the indexed right ventricular diastolic and systolic   Volumes.    1/2019 ECHO:  ------CONCLUSIONS------  Patient after atrial switch operation for complete transposition of the great  arteries. Technically difficult study due to poor acoustic windows. There is  moderate right ventricular enlargement. The right ventricular function is  qualitatively moderately depressed. Qualitivley the right ventricle function  is unchanged form the 3/30/2018 echo. Mild (1+) tricuspid valve insufficiency.  No obvious baffle obstruction or leaks seen. Qualitatively normal left  ventricular systolic function. Mild (1+) pulmonary valve insufficiency.  Limited visualization of the LPA suggests narrowing. RPA not seen.    Assessment and Plan:   Mr. Chen is a 38 year old male who has a past medical history significant for dTGA s/p modified Shoemaker operation and stitch closure of small VSD 1981, AFL s/p DCCV 3/7/18 and s/p ablation 8/2000, liver and kidney transplant 2016, post transplant lymphoproliferative disorder, papillary thyroid cancer s/p thyroidectomy, prior ETOH abuse (sober since 2014), anxiety, and depression. He presents today for follow up. He presented to ER on 1/16/19 reporting some shortness of breath and palpitations. He was in sinus on presentation to ER. He was discharged on a zio patch monitor. Zio patch from 1/17/19-1/31/19 showed SR with rare ectopy and 7 NSVT up to 10 beats. 3 symptom activations showed sinus. An echo from 1/2019 showed moderately depressed Rv function, no obvious baffle obstructions or leaks. He reports feeling well no recurrent palpitations.      Atrial Flutter:   We discussed in detail with the patient  management/treatment options for AFL includin. Stroke Prophylaxis:  CHADSVASC=+HF, +HTN  2, corresponding to a 2.2% annual stroke / systemic emolism event rate. indicating need for long term oral anticoagulation. He also has congenital anatomy which we would recommend anticoagulation. He is appropriately on Eliquis. No bleeding isues.    2. Rate Control: Continue Metoprolol XL 25 mg daily.   3. Rhythm Control: Cardioversion, Antiarrhythmics and/or ablation are options for rhythm control. He has previously been on Sotalol. Not currently on AATs. S/p DCCV 3/7/18  with history of Ablation at OSH in . Organized A.flutter is highly amenable to ablation procedure. An ablation can be considered for any recurrence.   4. Risk Factor Management: maintain tight BP control, and JUAN evaluation.       dTGA systemic EF 35-40%, NYHA I-II:  1. ACEi/ARB: Continue Lisiopril.  2. BB: Continue Metoprolol XL 25 mg daily.   3. Aldosterone antagonist: not required.  4. SCD prophylaxis: not currently indicated.   5. Fluid status: euvolemic on exam    Follow up in about 1 year to be timed with Dr. Alba follow up.     The patient states understanding and is agreeable with plan.   This patient was seen and evaluated with Dr. George. The above note reflects our joint assessment and plan.   DARYN Rios CNP  Pager: [phone]    EP STAFF NOTE  I have seen and examined the patient as part of a shared visit with MADELYN Rios NP.  I agree with the note above. I reviewed today's vital signs and medications. I have reviewed and discussed with the advanced practice provider their physical examination, assessment, and plan   Briefly, no clear recurrences  My key history/exam findings are: RRR.   The key management decisions made by me: f/u in one year.    Jaylen George MD [facility]  Cardiology - Electrophysiology

## 2019-03-08 NOTE — PATIENT INSTRUCTIONS
You were seen today in the Adult Congenital and Cardiovascular Genetics Clinic at the [facility].     Cardiology Providers you saw during your visit:  Dr. Francesca Alba and Dr. Jaylen George     Diagnosis:  TGA     Results:  Dr. Alba reviewed the results of your labs with you in clinic today     Recommendations:    1.  Continue to eat a heart healthy, low salt diet.  2.  Continue to get 20-30 minutes of aerobic activity, 4-5 days per week.  Examples of aerobic activity include walking, running, swimming, cycling, etc.  3.  Continue to observe good oral hygiene, with regular dental visits.  4. Please have a Right Heart Cath in the next month.  5. No other changes.     You are scheduled for a RIGHT HEART CATHETERIZATION at [facility] [place].    You should arrive and check in at the [facility] waiting room, located in the [facility] hospital.    The address is:   [address] [place] [address]    The phone number is: [phone].    Arrange your ride home  You may feel dizzy or forgetful for the first 24 hoursafter your test or treatment. For this reason,  you must:  1. Arrange for someone to drive you to and from the hospital.    2. Plan for someone to stay with you the night afteryour test or treatment.    When to stop eating and drinking  To make your test or treatment as safe as possible:  1. You should not drink alcohol for 24 hoursbefore your test.    2. 8 hours before your test, stop all food, milk andchewing tobacco.    3. Keep drinking clear liquids until 2 hours before your test. Clear liquids include water, clear juice, gatorade, soda - no milk products.     You may take your morning medications with a sip of water.     Hold these medications:  We will call you to discuss meds.    You may drive yourself to the hospital, but you will need to stay in the waiting room for one hour after your procedure before you are able to leave.  is available at no additional  cost in front of the building.    Please do not hesitate to call me if you have any questions or concerns    Exercise restrictions:           Yes____  No__x__         If yes, list restrictions:  Must be allowed to rest if fatigued or SOB        Work restrictions:     Yes____  No_x___         If yes, list restrictions:     FASTING CHOLESTEROL was checked in the last 5 years YES___  NO_x__   Continue to eat a heart healthy, low salt diet.         ____ Fasting lipid panel order today         ____ No changes in medications          ____ I recommend the following changes in your cholesterol medications.:          ____ Please follow up for cholesterol screening at your primary care physician        Follow-up:  Follow up with Dr. Alba in 6 months (unless your heart cath shows us anything you need to be seen sooner). Follow up with Dr. George in 1 year.     For after hours urgent needs, call [phone] and ask to speak to the Adult Congenital Physician on call.  Mention Job Code [id number].     For emergencies call [phone].      For any scheduling needs, please call [facility] Procedure , at [phone]  Thank you for your visit today!  If you have questions or concerns about today's visit, please call me.     Gagandeep Corado, RN, BSN  Cardiology Care Coordinator  [facility]  [phone]     [address] [facility]  Mail Code [id number]  [place] [address]

## 2019-03-08 NOTE — LETTER
3/8/2019      RE: Cricket hCen  [address]  [place] [address]       Dear Colleague,    Thank you for the opportunity to participate in the care of your patient, Cricket Chen, at the [facility] at [facility]. Please see a copy of my visit note below.    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      Pt reports that he has been feeling well.  He denies any chest pain or pressure, sob/lucia, orthopnea, pnd, palpitations, syncope/presyncope, suzette or change in exercise tolerance.  He also denies any problems with his medications and reports compliance.         Past Medical History:   Diagnosis Date     Alcohol abuse     Last drink in Mid-April 2014     Anemia in ESRD (end-stage renal disease) (H)      Anxiety 2008     Atrial flutter (H) 2017     Cirrhosis (H)     S/P liver transplant     Depression      History of blood transfusion      History of transposition of great vessels     atrial switch at age 8 months old     Hypertension      Liver transplant recipient (H)     2016     Papillary thyroid carcinoma (H)      Pneumonia 11-15-14     Renal transplant recipient     2016     Varices, esophageal (H)        Past Surgical History:   Procedure Laterality Date     ANESTHESIA CARDIOVERSION N/A 3/7/2018    Procedure: ANESTHESIA CARDIOVERSION;  Cardioversion;  Surgeon: GENERIC ANESTHESIA PROVIDER;  [facility]: [facility]     BENCH LIVER N/A 5/10/2016    Procedure: BENCH LIVER;  Surgeon: Ricky Deshpande MD;  [facility]     BIOPSY LYMPH NODE CERVICAL Right 1/26/2017    Procedure: BIOPSY  LYMPH NODE CERVICAL;  Surgeon: Beka Soto MD;  [facility]     CARDIAC SURGERY  9-10-80     CREATE FISTULA ARTERIOVENOUS UPPER EXTREMITY Right 9/16/2014    Procedure: CREATE FISTULA ARTERIOVENOUS UPPER EXTREMITY;  Surgeon: Padmaja Eaton MD;  [facility]     CYSTOSCOPY, REMOVE STENT(S), COMBINED Right 6/22/2016    Procedure: COMBINED CYSTOSCOPY, REMOVE STENT(S);  Surgeon: Ricky Deshpande MD;  [facility]     EMBOLECTOMY UPPER EXTREMITY Right 8/17/2018    Procedure: EMBOLECTOMY UPPER EXTREMITY;  Repair Right Upper Arm Pseudo Aneursym ;  Surgeon: John Payton MD;  [facility]     ENT SURGERY       ESOPHAGOSCOPY, GASTROSCOPY, DUODENOSCOPY (EGD), COMBINED  5/30/2014    Procedure: COMBINED ESOPHAGOSCOPY, GASTROSCOPY, DUODENOSCOPY (EGD);  Surgeon: Guillaume Bautista MD;  Location:  GI     ESOPHAGOSCOPY, GASTROSCOPY, DUODENOSCOPY (EGD), COMBINED  9/30/14     ESOPHAGOSCOPY, GASTROSCOPY, DUODENOSCOPY (EGD), COMBINED Left 3/12/2015    Procedure: COMBINED ESOPHAGOSCOPY, GASTROSCOPY, DUODENOSCOPY (EGD);  Surgeon: Laureen Galvan MD;  Location:  GI     ESOPHAGOSCOPY, GASTROSCOPY, DUODENOSCOPY (EGD), COMBINED N/A 4/21/2016    Procedure: COMBINED ESOPHAGOSCOPY, GASTROSCOPY, DUODENOSCOPY (EGD);  Surgeon: Laureen Galvan MD;  Location:  GI     ESOPHAGOSCOPY, GASTROSCOPY, DUODENOSCOPY (EGD), COMBINED N/A 7/21/2016    Procedure: COMBINED ESOPHAGOSCOPY, GASTROSCOPY, DUODENOSCOPY (EGD);  Surgeon: Laureen Galvan MD;  [facility]     HC OR CATH ABLATION NON-CARDIAC ENDOVASCULAR  1990,2002    SVT     INSERT PORT VASCULAR ACCESS N/A 4/3/2017    Procedure: INSERT PORT VASCULAR ACCESS;  Surgeon: Rajendra Jacques PA-C;  [facility]     IR PORT REMOVAL LEFT  1/22/2019     LIGATE FISTULA ARTERIOVENOUS UPPER EXTREMITY Right 11/7/2017    Procedure: LIGATE FISTULA ARTERIOVENOUS UPPER EXTREMITY;  Revision of Right Arm Arteriovenous Fistula ;  Surgeon:  Padmaja Eaton MD;  [facility]     PERCUTANEOUS BIOPSY KIDNEY Right 2018    Procedure: PERCUTANEOUS BIOPSY KIDNEY;  Right Kidney Biopsy;  Surgeon: Yuval Khan MD;  [facility]     PICC INSERTION  14    PICC line placement 14; Removal 2014     REMOVE PORT VASCULAR ACCESS Right 2019    Procedure: Right chest Port Removal;  Surgeon: Erasmo Ziegler PA-C;  Location:  OR     THORACIC SURGERY  1980    Transposition great arteries, repaired at 8 months     THYROIDECTOMY Right 2017    Procedure: THYROIDECTOMY;  Bilateral Total Thyroidectomy ;  Surgeon: Beka Soto MD;  [facility]     TRANSPLANT KIDNEY RECIPIENT  DONOR  5/10/2016    Procedure: TRANSPLANT KIDNEY RECIPIENT  DONOR;  Surgeon: Ricky Deshpande MD;  [facility]     TRANSPLANT LIVER RECIPIENT  DONOR N/A 5/10/2016    Procedure: TRANSPLANT LIVER RECIPIENT  DONOR;  Surgeon: Ricky Deshpande MD;  [facility]   As above, including HPI    Family History   Problem Relation Age of Onset     No Known Problems Brother      Arthritis Father      Hypertension Father      Hypertension Mother      Anxiety Disorder Mother      Hypertension Sister      No Known Problems Maternal Grandmother      No Known Problems Maternal Grandfather      No Known Problems Paternal Grandmother      No Known Problems Sister      No Known Problems Paternal Grandfather      Alcohol/Drug No family hx of      Gastrointestinal Disease No family hx of         no fam hx of liver disease or liver cancer       Meds  Reviewed      Allergies   Allergen Reactions     Hydromorphone Itching     ROS: 12 point ROS neg other than the symptoms noted above in the HPI.    /65, HR 72  General: appears comfortable, alert and articulate  Head: normocephalic, atraumatic  Eyes: anicteric sclera, EOMI  Neck: no adenopathy or masses, 2+ carotids without bruits  Orophyarynx: moist mucosa, no lesions,  dentition intact  Heart: regular, normal S1, loud S2, no murmur, gallop, or rub, estimated JVP <10 cm  Lungs: clear, no rales or wheezing  Abdomen: soft, non-tender, bowel sounds present, no hepatomegaly  Extremities: no clubbing, cyanosis or edema  Neurological: normal speech and affect, no gross motor deficits    Labs:  reviewed    Cardiac MRI 3/9/2017  1. Transposition of the great arteries with native atrioventricular   concordance and ventricular arterial discordance with the aorta anterior   to the pulmonary artery (D-transposition of the great arteries).  2. Status post interatrial baffle with the superior vena cava and inferior   vena cava baffled to the left (subpulmonic) ventricle without obstruction.    There is no evidence of a baffle leak.    3. Systemic right ventricle:  a. The right ventricle is hypertrophied.  b. Decreased systolic function with an ejection fraction of 33%.  c. Severe dilation of the right ventricle with an end-diastolic volume of   208 mL/m  (previously 188 mL/m ) and end-systolic volume of 140 mL/m    (previous 123 mL/m ).  4. The right ventricle connects with the anterior aorta.  The coronary   artery origins are usual for transposition.  Left-sided aortic arch with   measurements above.  5. The pulmonary venous baffle to the right ventricle is widely patent.  6. The subpulmonary left ventricle has normal systolic function with   decreased myocardial mass.  The left ventricle connects to the pulmonary   artery, which is posterior to the aorta.  7. No significant change from previous cardiac MRI of 9/4/2012 except an   increased size in the indexed right ventricular diastolic and systolic   Volumes    Echo (3/30/18):   Patient after atrial switch operation for complete transposition of the great arteries. Technically difficult study due to poor acoustic windows. No baffle obstruction or leaks seen. There is moderate right ventricular enlargement. Single plane right ventricular EF  35-40 %. Normal left ventricular systolic function. No outflow obstruction. Mild tricuspid regurgitation.    EKG (3/30:18): Sinus rhythm, RAD, RBBB, prominent RV forces with repolarization abnormality.      Echo 1/17/19  Patient after atrial switch operation for complete transposition of the great  arteries. Technically difficult study due to poor acoustic windows. There is  moderate right ventricular enlargement. The right ventricular function is  qualitatively moderately depressed. Qualitivley the right ventricle function  is unchanged form the 3/30/2018 echo. Mild (1+) tricuspid valve insufficiency.  No obvious baffle obstruction or leaks seen. Qualitatively normal left  ventricular systolic function. Mild (1+) pulmonary valve insufficiency.  Limited visualization of the LPA suggests narrowing. RPA not seen.    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    1.  TGA s/p atrial switch now with stably depressed systemic ventricular function:  Although ventricular function is depressed this has been stable for the past several years.  In regard to heart failure medical regimen, his systemic ventricle is an anatomic right ventricle and there is no proven therapies to improve morbidity or mortality in right ventricular failure and especially systemic right ventricular failure.  As he is currently not experiencing any symptoms and does not have any evidence of volume overload on today's exam, thus we will not change his medication regimen at this point in time - will continue current doses of metoprolol xl and lisinopril. Would  like for him to undergo a repeat RHC for ongoing baseline evaluation.      2.  H/o atrial flutter s/p ablation: Seen by congenital EP today.  Please see their note for detailed findings and plan.     Follow-up:  In 6 months unless RHC results dictate earlier f/u.    Francesca Alba MD      Answers for HPI/ROS submitted by the patient on 3/3/2019   General Symptoms: No  Skin Symptoms: No  HENT Symptoms: No  EYE SYMPTOMS: No  HEART SYMPTOMS: No  LUNG SYMPTOMS: No  INTESTINAL SYMPTOMS: No  URINARY SYMPTOMS: No  REPRODUCTIVE SYMPTOMS: No  SKELETAL SYMPTOMS: No  BLOOD SYMPTOMS: No  NERVOUS SYSTEM SYMPTOMS: No  MENTAL HEALTH SYMPTOMS: No

## 2019-03-08 NOTE — PATIENT INSTRUCTIONS
You were seen today in the Adult Congenital and Cardiovascular Genetics Clinic at the [facility].     Cardiology Providers you saw during your visit:  Dr. Francesca Alba and Dr. Jaylen George     Diagnosis:  TGA     Results:  Dr. Alba reviewed the results of your labs with you in clinic today     Recommendations:    1.  Continue to eat a heart healthy, low salt diet.  2.  Continue to get 20-30 minutes of aerobic activity, 4-5 days per week.  Examples of aerobic activity include walking, running, swimming, cycling, etc.  3.  Continue to observe good oral hygiene, with regular dental visits.  4. Please have a Right Heart Cath in the next month.  5. No other changes.     You are scheduled for a RIGHT HEART CATHETERIZATION at [facility], [place].    You should arrive and check in at the [facility] waiting room, located in the [facility] hospital.    The address is:   [address] [place] [address]    The phone number is: [phone].    Arrange your ride home  You may feel dizzy or forgetful for the first 24 hoursafter your test or treatment. For this reason,  you must:  1. Arrange for someone to drive you to and from the hospital.    2. Plan for someone to stay with you the night afteryour test or treatment.    When to stop eating and drinking  To make your test or treatment as safe as possible:  1. You should not drink alcohol for 24 hoursbefore your test.    2. 8 hours before your test, stop all food, milk andchewing tobacco.    3. Keep drinking clear liquids until 2 hours before your test. Clear liquids include water, clear juice, gatorade, soda - no milk products.     You may take your morning medications with a sip of water.     Hold these medications:  We will call you to discuss meds.    You may drive yourself to the hospital, but you will need to stay in the waiting room for one hour after your procedure before you are able to leave.  is available at no additional  cost in front of the building.    Please do not hesitate to call me if you have any questions or concerns    Exercise restrictions:           Yes____  No__x__         If yes, list restrictions:  Must be allowed to rest if fatigued or SOB        Work restrictions:     Yes____  No_x___         If yes, list restrictions:     FASTING CHOLESTEROL was checked in the last 5 years YES___  NO_x__   Continue to eat a heart healthy, low salt diet.         ____ Fasting lipid panel order today         ____ No changes in medications          ____ I recommend the following changes in your cholesterol medications.:          ____ Please follow up for cholesterol screening at your primary care physician        Follow-up:  Follow up with Dr. Alba in 6 months (unless your heart cath shows us anything you need to be seen sooner). Follow up with Dr. George in 1 year.     For after hours urgent needs, call [phone] and ask to speak to the Adult Congenital Physician on call.  Mention Job Code 0401.     For emergencies call [phone].      For any scheduling needs, please call [facility] Procedure , at [phone]  Thank you for your visit today!  If you have questions or concerns about today's visit, please call me.     Gagandeep Corado, RN, BSN  Cardiology Care Coordinator  [facility]  [phone]     [address]  Mail Code [id number]  [place] [address]

## 2019-03-08 NOTE — NURSING NOTE
Med Reconcile: Reviewed and verified all current medications with the patient. The updated medication list was printed and given to the patient.  Return Appointment: Patient given instructions regarding scheduling next clinic visit. Patient demonstrated understanding of this information and agreed to call with further questions or concerns.  Right Heart Catheterization: Patient was instructed regarding right heart catheterization, including discussion of the procedure, preparation, intra-procedural steps, and recovery at home. Patient demonstrated understanding of this information and agreed to call with further questions or concerns.  Patient stated he understood all health information given and agreed to call with further questions or concerns.     Isabel Victor RN, BSN  Cardiology Care Coordinator  [facility]  [email or web address]  [phone]

## 2019-03-08 NOTE — LETTER
3/8/2019      RE: Cricket Chen  [address]  [facility] [address]       Dear Colleague,    Thank you for the opportunity to participate in the care of your patient, Cricket Chen, at the [facility] HEART [facility]. Please see a copy of my visit note below.    [facility] Electrophysiology Clinic Note  HPI:   Mr. Chen is a 38 year old male who has a past medical history significant for dTGA s/p modified Shoemaker operation and stitch closure of small VSD 1981, AFL s/p DCCV and s/p ablation 8/2000, liver and kidney transplant 2016, post transplant lymphoproliferative disorder, papillary thyroid cancer s/p thyroidectomy, prior ETOH abuse (sober since 2014), anxiety, and depression. He presents today for follow up.     He presented to [facility] on 3/6/18 with shortness of breath and chest flutterings that started the day prior. He recalled it felt similar to when he was in AFL in the past. In the [facility], he was found to be in AFL. He was subsequently admitted. He was started on Eliquis and arranged for NEELIMA/DCCV. He had successful DCCV 3/7/18 and he was discharged. He states that he had AFL in the past. He believes he had a DCCV around age 7. He had also been maintained on Sotalol. He also reports a previous ablation at an OSH in 8/2000 (records not available to us at this time). He did well without AFL issues until 3/2018 when he had recurrence. Most recent echo from 3/30/18 showed systemic EF 35-40%, mild TR, and no evidence of baffle leaks/obstructions. A CMRI from OSH from 3/2017 showed no baffle  Obstruction and systemic EF 33%. [facility]. He was doing well. He had not noted any further arrhythmias since his DCCV. He followed up in EP clinic in 7/2018 and was doing well without recurrent AFL.     He presents today for follow up. He presented to [facility] on 1/16/19 reporting some shortness of breath and palpitations. He was in sinus on presentation  to ER. He was discharged on a zio patch monitor. Zio patch from 1/17/19-1/31/19 showed SR with rare ectopy and 7 NSVT up to 10 beats. 3 symptom activations showed sinus. An echo from 1/2019 showed moderately depressed Rv function, no obvious baffle obstructions or leaks. He reports feeling well and no recurrent palpitations. He denies any chest pain/pressures, dizziness, lightheadedness, worsening shortness of breath, leg/ankle swelling, PND, orthopnea, palpitations, or syncopal symptoms. Presenting 12 lead ECG shows SB Vent Rate 59 bpm,  ms,  ms, QTc 429 ms. Current cardiac medications include: Toprol XL, Eliquis, Lisinopril, and Norvasc.         PAST MEDICAL HISTORY:  Past Medical History:   Diagnosis Date     Alcohol abuse     Last drink in Mid-April 2014     Anemia in ESRD (end-stage renal disease) (H)      Anxiety 2008     Atrial flutter (H) 2017     Cirrhosis (H)     S/P liver transplant     Depression      History of blood transfusion      History of transposition of great vessels     atrial switch at age 8 months old     Hypertension      Liver transplant recipient (H)     2016     Papillary thyroid carcinoma (H)      Pneumonia 11-15-14     Renal transplant recipient     2016     Varices, esophageal (H)        CURRENT MEDICATIONS:  Current Outpatient Medications   Medication Sig Dispense Refill     amLODIPine (NORVASC) 10 MG tablet TAKE ONE TABLET BY MOUTH EVERY DAY 90 tablet 3     apixaban ANTICOAGULANT (ELIQUIS) 5 MG tablet Take 1 tablet (5 mg) by mouth 2 times daily 180 tablet 3     busPIRone (BUSPAR) 15 MG tablet Take one tablet (15 mg) twice daily for anxiety. 180 tablet 0     desonide (DESOWEN) 0.05 % ointment Apply topically 2 times daily 15 g 3     hydrOXYzine (ATARAX) 50 MG tablet Take 1-2 tablets ( mg) by mouth nightly as needed for anxiety 90 tablet 1     ketoconazole (NIZORAL) 2 % cream Twice daily to areas of rash on face 60 g 1     ketoconazole (NIZORAL) 2 % shampoo Use as a  foaming face wash in shower daily 120 mL 3     levothyroxine (SYNTHROID/LEVOTHROID) 137 MCG tablet Take 1 tablet by mouth Monday - Saturday  and 1.5 tablets on Sunday 96 tablet 3     lisinopril (PRINIVIL/ZESTRIL) 5 MG tablet TAKE ONE TABLET BY MOUTH EVERY DAY 90 tablet 3     LORazepam (ATIVAN) 0.5 MG tablet Take 1 tablet (0.5 mg) by mouth 2 times daily as needed (for panic attacks) 30 tablet 0     magnesium oxide (MAG-OX) 400 MG tablet TAKE ONE TABLET BY MOUTH TWICE A  tablet 11     metoprolol succinate (TOPROL-XL) 25 MG 24 hr tablet Take 1 tablet (25 mg) by mouth daily 90 tablet 3     mycophenolate (GENERIC EQUIVALENT) 250 MG capsule Take 1 capsule (250 mg) by mouth 2 times daily 60 capsule 11     sulfamethoxazole-trimethoprim (BACTRIM/SEPTRA) 400-80 MG tablet Take 1 tablet by mouth daily 30 tablet 11     tacrolimus (GENERIC EQUIVALENT) 1 MG capsule Take 1 capsule (1 mg) by mouth 2 times daily 60 capsule 11       PAST SURGICAL HISTORY:  Past Surgical History:   Procedure Laterality Date     ANESTHESIA CARDIOVERSION N/A 3/7/2018    Procedure: ANESTHESIA CARDIOVERSION;  Cardioversion;  Surgeon: GENERIC ANESTHESIA PROVIDER;  Location: [facility]     BENCH LIVER N/A 5/10/2016    Procedure: BENCH LIVER;  Surgeon: Ricky Deshpande MD;  [facility]     BIOPSY LYMPH NODE CERVICAL Right 1/26/2017    Procedure: BIOPSY LYMPH NODE CERVICAL;  Surgeon: Beka Soto MD;  [facility]: [facility] OR     CARDIAC SURGERY  9-10[birth year]80     CREATE FISTULA ARTERIOVENOUS UPPER EXTREMITY Right 9/16/2014    Procedure: CREATE FISTULA ARTERIOVENOUS UPPER EXTREMITY;  Surgeon: Padmaja Eaton MD;  [facility] OR     CYSTOSCOPY, REMOVE STENT(S), COMBINED Right 6/22/2016    Procedure: COMBINED CYSTOSCOPY, REMOVE STENT(S);  Surgeon: Ricky Deshpande MD;  [facility]     EMBOLECTOMY UPPER EXTREMITY Right 8/17/2018    Procedure: EMBOLECTOMY UPPER EXTREMITY;  Repair Right Upper Arm Pseudo Aneursym ;  Surgeon: John Payton MD;   Location: [facility] OR     ENT SURGERY       ESOPHAGOSCOPY, GASTROSCOPY, DUODENOSCOPY (EGD), COMBINED  5/30/2014    Procedure: COMBINED ESOPHAGOSCOPY, GASTROSCOPY, DUODENOSCOPY (EGD);  Surgeon: Guillaume Bautista MD;  [facility]     ESOPHAGOSCOPY, GASTROSCOPY, DUODENOSCOPY (EGD), COMBINED  9/30/14     ESOPHAGOSCOPY, GASTROSCOPY, DUODENOSCOPY (EGD), COMBINED Left 3/12/2015    Procedure: COMBINED ESOPHAGOSCOPY, GASTROSCOPY, DUODENOSCOPY (EGD);  Surgeon: Laureen Galvan MD;  [facility]     ESOPHAGOSCOPY, GASTROSCOPY, DUODENOSCOPY (EGD), COMBINED N/A 4/21/2016    Procedure: COMBINED ESOPHAGOSCOPY, GASTROSCOPY, DUODENOSCOPY (EGD);  Surgeon: Laureen Galvan MD;  Location:  GI     ESOPHAGOSCOPY, GASTROSCOPY, DUODENOSCOPY (EGD), COMBINED N/A 7/21/2016    Procedure: COMBINED ESOPHAGOSCOPY, GASTROSCOPY, DUODENOSCOPY (EGD);  Surgeon: Laureen Galvan MD;  [facility]     HC OR CATH ABLATION NON-CARDIAC ENDOVASCULAR  1990,2002    SVT     INSERT PORT VASCULAR ACCESS N/A 4/3/2017    Procedure: INSERT PORT VASCULAR ACCESS;  Surgeon: Rajendra Jacques PA-C;  [facility]     IR PORT REMOVAL LEFT  1/22/2019     LIGATE FISTULA ARTERIOVENOUS UPPER EXTREMITY Right 11/7/2017    Procedure: LIGATE FISTULA ARTERIOVENOUS UPPER EXTREMITY;  Revision of Right Arm Arteriovenous Fistula ;  Surgeon: Padmaja Eaton MD;  Location: [facility] OR     PERCUTANEOUS BIOPSY KIDNEY Right 9/26/2018    Procedure: PERCUTANEOUS BIOPSY KIDNEY;  Right Kidney Biopsy;  Surgeon: Yuval Khan MD;  [facility]     PICC INSERTION  5/30/14    PICC line placement 5/30/14; Removal 6/9/2014     REMOVE PORT VASCULAR ACCESS Right 1/22/2019    Procedure: Right chest Port Removal;  Surgeon: Erasmo Ziegler PA-C;  Location:  OR     THORACIC SURGERY  1980    Transposition great arteries, repaired at 8 months     THYROIDECTOMY Right 8/7/2017    Procedure: THYROIDECTOMY;  Bilateral Total Thyroidectomy ;   "Surgeon: Beka Soto MD;  [facility]: [facility] OR     TRANSPLANT KIDNEY RECIPIENT  DONOR  5/10/2016    Procedure: TRANSPLANT KIDNEY RECIPIENT  DONOR;  Surgeon: Ricky Deshpande MD;  [facility]     TRANSPLANT LIVER RECIPIENT  DONOR N/A 5/10/2016    Procedure: TRANSPLANT LIVER RECIPIENT  DONOR;  Surgeon: Ricky Deshpande MD;  [facility]: [facility] OR       ALLERGIES:     Allergies   Allergen Reactions     Hydromorphone Itching       FAMILY HISTORY:  Family History   Problem Relation Age of Onset     No Known Problems Brother      Arthritis Father      Hypertension Father      Hypertension Mother      Anxiety Disorder Mother      Hypertension Sister      No Known Problems Maternal Grandmother      No Known Problems Maternal Grandfather      No Known Problems Paternal Grandmother      No Known Problems Sister      No Known Problems Paternal Grandfather      Alcohol/Drug No family hx of      Gastrointestinal Disease No family hx of         no fam hx of liver disease or liver cancer       SOCIAL HISTORY:  Social History     Tobacco Use     Smoking status: Former Smoker     Packs/day: 0.33     Years: 3.00     Pack years: 0.99     Types: Cigarettes     Start date: 1997     Last attempt to quit: 2000     Years since quittin.4     Smokeless tobacco: Former User     Tobacco comment: Quit chewing in early    Substance Use Topics     Alcohol use: No     Alcohol/week: 0.0 oz     Comment: ~10 drinks per day for ten years, quit in 2014     Drug use: No       ROS:   A comprehensive 10 point review of systems negative other than as mentioned in HPI.  Exam:  /75 (BP Location: Left arm, Patient Position: Chair, Cuff Size: Adult Regular)   Pulse 62   Ht 1.803 m (5' 11\")   Wt 94.3 kg (208 lb)   SpO2 98%   BMI 29.01 kg/m     GENERAL APPEARANCE: alert and no distress  HEENT: no icterus, no xanthelasmas, normal pupil size and reaction, normal palate, mucosa moist, no " central cyanosis  NECK: no adenopathy, no asymmetry, masses, or scars, thyroid normal to palpation and no bruits, JVP not elevated  RESPIRATORY: lungs clear to auscultation - no rales, rhonchi or wheezes, no use of accessory muscles, no retractions, respirations are unlabored, normal respiratory rate  CARDIOVASCULAR: regular rhythm, normal S1 with physiologic split S2, no S3 or S4 and no murmur, click or rub, precordium quiet with normal PMI.  ABDOMEN: soft, non tender, bowel sounds normal, non-distended  EXTREMITIES: peripheral pulses normal, no edema  NEURO: alert and oriented to person/place/time, normal speech, gait and affect  SKIN: no ecchymoses, no rashes  PSYCH: normal affect, cooperative    Labs:  Reviewed.     Testing/Procedures:  PULMONARY FUNCTION TESTS:   PFT Latest Ref Rng & Units 10/14/2014   FVC L 4.95   FEV1 L 4.38   FVC% % 92   FEV1% % 100         3/30/18 ECHOCARDIOGRAM:   Patient after atrial switch operation for complete transposition of the great  arteries. Technically difficult study due to poor acoustic windows. No baffle  obstruction or leaks seen. There is moderate right ventricular enlargement.  Single plane right ventricular EF 35-40 %. Normal left ventricular systolic  function. No outflow obstruction. Mild tricuspid regurgitation.    3/2017 CMRI (OSH REPORT):  1. Transposition of the great arteries with native atrioventricular   concordance and ventricular arterial discordance with the aorta anterior   to the pulmonary artery (D-transposition of the great arteries).  2. Status post interatrial baffle with the superior vena cava and inferior   vena cava baffled to the left (subpulmonic) ventricle without obstruction.    There is no evidence of a baffle leak.    3. Systemic right ventricle:  a. The right ventricle is hypertrophied.  b. Decreased systolic function with an ejection fraction of 33%.  c. Severe dilation of the right ventricle with an end-diastolic volume of   208 mL/m   (previously 188 mL/m ) and end-systolic volume of 140 mL/m    (previous 123 mL/m ).  4. The right ventricle connects with the anterior aorta.  The coronary   artery origins are usual for transposition.  Left-sided aortic arch with   measurements above.  5. The pulmonary venous baffle to the right ventricle is widely patent.  6. The subpulmonary left ventricle has normal systolic function with   decreased myocardial mass.  The left ventricle connects to the pulmonary   artery, which is posterior to the aorta.  7. No significant change from previous cardiac MRI of 9/4/2012 except an   increased size in the indexed right ventricular diastolic and systolic   Volumes.    1/2019 ECHO:  ------CONCLUSIONS------  Patient after atrial switch operation for complete transposition of the great  arteries. Technically difficult study due to poor acoustic windows. There is  moderate right ventricular enlargement. The right ventricular function is  qualitatively moderately depressed. Qualitivley the right ventricle function  is unchanged form the 3/30/2018 echo. Mild (1+) tricuspid valve insufficiency.  No obvious baffle obstruction or leaks seen. Qualitatively normal left  ventricular systolic function. Mild (1+) pulmonary valve insufficiency.  Limited visualization of the LPA suggests narrowing. RPA not seen.    Assessment and Plan:   Mr. Chen is a 38 year old male who has a past medical history significant for dTGA s/p modified Shoemaker operation and stitch closure of small VSD 1981, AFL s/p DCCV 3/7/18 and s/p ablation 8/2000, liver and kidney transplant 2016, post transplant lymphoproliferative disorder, papillary thyroid cancer s/p thyroidectomy, prior ETOH abuse (sober since 2014), anxiety, and depression. He presents today for follow up. He [facility] to [facility]19 reporting some shortness of breath and palpitations. He was in sinus on presentation to ER. He was discharged on a zio patch monitor. Zio patch from  19-19 showed SR with rare ectopy and 7 NSVT up to 10 beats. 3 symptom activations showed sinus. An echo from 2019 showed moderately depressed Rv function, no obvious baffle obstructions or leaks. He reports feeling well no recurrent palpitations.      Atrial Flutter:   We discussed in detail with the patient management/treatment options for AFL includin. Stroke Prophylaxis:  CHADSVASC=+HF, +HTN  2, corresponding to a 2.2% annual stroke / systemic emolism event rate. indicating need for long term oral anticoagulation. He also has congenital anatomy which we would recommend anticoagulation. He is appropriately on Eliquis. No bleeding isues.    2. Rate Control: Continue Metoprolol XL 25 mg daily.   3. Rhythm Control: Cardioversion, Antiarrhythmics and/or ablation are options for rhythm control. He has previously been on Sotalol. Not currently on AATs. S/p DCCV 3/7/18  with history of Ablation at OSH in . Organized A.flutter is highly amenable to ablation procedure. An ablation can be considered for any recurrence.   4. Risk Factor Management: maintain tight BP control, and JUAN evaluation.       dTGA systemic EF 35-40%, NYHA I-II:  1. ACEi/ARB: Continue Lisiopril.  2. BB: Continue Metoprolol XL 25 mg daily.   3. Aldosterone antagonist: not required.  4. SCD prophylaxis: not currently indicated.   5. Fluid status: euvolemic on exam    Follow up in about 1 year to be timed with Dr. Alba follow up.     The patient states understanding and is agreeable with plan.   This patient was seen and evaluated with Dr. George. The above note reflects our joint assessment and plan.   DARYN Rios CNP  Pager: [phone]    EP STAFF NOTE  I have seen and examined the patient as part of a shared visit with MADELYN Rios NP.  I agree with the note above. I reviewed today's vital signs and medications. I have reviewed and discussed with the advanced practice provider their physical examination, assessment, and  plan   Briefly, no clear recurrences  My key history/exam findings are: RRR.   The key management decisions made by me: f/u in one year.    Jaylen George MD [facility]  Card[facility] - Electrophysiology

## 2019-03-08 NOTE — NURSING NOTE
Chief Complaint   Patient presents with     Follow Up     38 year old male with history of D-TGA s/p atrial switch (modified Henrik 4/23/81), a flutter s/p DCCV, chest pain, and shortness of breath presenting for follow up     Vitals were taken and medications were reconciled. EKG was performed    Amira [facility]  1:18 PM

## 2019-03-11 ENCOUNTER — TELEPHONE (OUTPATIENT)
Dept: PSYCHIATRY | Facility: CLINIC | Age: 39
End: 2019-03-11

## 2019-03-11 LAB — INTERPRETATION ECG - MUSE: NORMAL

## 2019-03-11 NOTE — TELEPHONE ENCOUNTER
[facility]      Patient Name:  Cricket Chen  /Age:  1980 (38 year old)      Intervention: Patient was called and was sent a letter to follow up on mental health referral from DARYN Uriostegui CNP.    Status of Referral: Patient called clinic and is declining services at this time.    Plan: No further action at this time.      Marleny Cruz,     [facility]

## 2019-03-19 DIAGNOSIS — Z94.4 LIVER REPLACED BY TRANSPLANT (H): ICD-10-CM

## 2019-03-19 DIAGNOSIS — Z94.0 KIDNEY TRANSPLANTED: ICD-10-CM

## 2019-03-19 LAB
ALBUMIN SERPL-MCNC: 4 G/DL (ref 3.4–5)
ALP SERPL-CCNC: 98 U/L (ref 40–150)
ALT SERPL W P-5'-P-CCNC: 26 U/L (ref 0–70)
ANION GAP SERPL CALCULATED.3IONS-SCNC: 8 MMOL/L (ref 3–14)
AST SERPL W P-5'-P-CCNC: 21 U/L (ref 0–45)
BILIRUB DIRECT SERPL-MCNC: 0.2 MG/DL (ref 0–0.2)
BILIRUB SERPL-MCNC: 0.7 MG/DL (ref 0.2–1.3)
BUN SERPL-MCNC: 16 MG/DL (ref 7–30)
CALCIUM SERPL-MCNC: 9.3 MG/DL (ref 8.5–10.1)
CHLORIDE SERPL-SCNC: 108 MMOL/L (ref 94–109)
CO2 SERPL-SCNC: 24 MMOL/L (ref 20–32)
CREAT SERPL-MCNC: 1.43 MG/DL (ref 0.66–1.25)
ERYTHROCYTE [DISTWIDTH] IN BLOOD BY AUTOMATED COUNT: 12.8 % (ref 10–15)
GFR SERPL CREATININE-BSD FRML MDRD: 61 ML/MIN/{1.73_M2}
GLUCOSE SERPL-MCNC: 101 MG/DL (ref 70–99)
HCT VFR BLD AUTO: 45.7 % (ref 40–53)
HGB BLD-MCNC: 15.7 G/DL (ref 13.3–17.7)
MCH RBC QN AUTO: 30 PG (ref 26.5–33)
MCHC RBC AUTO-ENTMCNC: 34.4 G/DL (ref 31.5–36.5)
MCV RBC AUTO: 87 FL (ref 78–100)
PLATELET # BLD AUTO: 130 10E9/L (ref 150–450)
POTASSIUM SERPL-SCNC: 4.6 MMOL/L (ref 3.4–5.3)
PROT SERPL-MCNC: 7.3 G/DL (ref 6.8–8.8)
RBC # BLD AUTO: 5.23 10E12/L (ref 4.4–5.9)
SODIUM SERPL-SCNC: 140 MMOL/L (ref 133–144)
WBC # BLD AUTO: 5.3 10E9/L (ref 4–11)

## 2019-03-19 PROCEDURE — 80076 HEPATIC FUNCTION PANEL: CPT | Performed by: INTERNAL MEDICINE

## 2019-03-19 PROCEDURE — 80197 ASSAY OF TACROLIMUS: CPT | Performed by: INTERNAL MEDICINE

## 2019-03-19 PROCEDURE — 80048 BASIC METABOLIC PNL TOTAL CA: CPT | Performed by: INTERNAL MEDICINE

## 2019-03-19 PROCEDURE — 87799 DETECT AGENT NOS DNA QUANT: CPT | Performed by: INTERNAL MEDICINE

## 2019-03-19 PROCEDURE — 85027 COMPLETE CBC AUTOMATED: CPT | Performed by: INTERNAL MEDICINE

## 2019-03-19 PROCEDURE — 36415 COLL VENOUS BLD VENIPUNCTURE: CPT | Performed by: INTERNAL MEDICINE

## 2019-03-20 LAB
BKV DNA # SPEC NAA+PROBE: 3971 COPIES/ML
BKV DNA SPEC NAA+PROBE-LOG#: 3.6 LOG COPIES/ML
SPECIMEN SOURCE: ABNORMAL
TACROLIMUS BLD-MCNC: 4 UG/L (ref 5–15)
TME LAST DOSE: ABNORMAL H

## 2019-03-28 ENCOUNTER — OFFICE VISIT (OUTPATIENT)
Dept: NEPHROLOGY | Facility: CLINIC | Age: 39
End: 2019-03-28
Attending: INTERNAL MEDICINE
Payer: COMMERCIAL

## 2019-03-28 VITALS
WEIGHT: 210 LBS | BODY MASS INDEX: 29.29 KG/M2 | TEMPERATURE: 98.5 F | SYSTOLIC BLOOD PRESSURE: 135 MMHG | HEART RATE: 63 BPM | DIASTOLIC BLOOD PRESSURE: 87 MMHG | OXYGEN SATURATION: 99 %

## 2019-03-28 DIAGNOSIS — I15.1 HYPERTENSION SECONDARY TO OTHER RENAL DISORDERS: ICD-10-CM

## 2019-03-28 DIAGNOSIS — Z94.4 LIVER REPLACED BY TRANSPLANT (H): ICD-10-CM

## 2019-03-28 DIAGNOSIS — Z94.0 KIDNEY REPLACED BY TRANSPLANT: ICD-10-CM

## 2019-03-28 DIAGNOSIS — N25.81 SECONDARY RENAL HYPERPARATHYROIDISM (H): ICD-10-CM

## 2019-03-28 DIAGNOSIS — Z94.0 S/P KIDNEY TRANSPLANT: ICD-10-CM

## 2019-03-28 DIAGNOSIS — B27.00 EBV (EPSTEIN-BARR VIRUS) VIREMIA: ICD-10-CM

## 2019-03-28 DIAGNOSIS — B34.8 BK VIREMIA: ICD-10-CM

## 2019-03-28 DIAGNOSIS — D47.Z1 POST-TRANSPLANT LYMPHOPROLIFERATIVE DISORDER (H): ICD-10-CM

## 2019-03-28 DIAGNOSIS — Z92.25 HISTORY OF IMMUNOSUPPRESSION THERAPY: ICD-10-CM

## 2019-03-28 DIAGNOSIS — Z48.298 AFTERCARE FOLLOWING ORGAN TRANSPLANT: Primary | ICD-10-CM

## 2019-03-28 DIAGNOSIS — Z29.89 NEED FOR PNEUMOCYSTIS PROPHYLAXIS: ICD-10-CM

## 2019-03-28 DIAGNOSIS — D84.9 IMMUNOSUPPRESSED STATUS (H): ICD-10-CM

## 2019-03-28 DIAGNOSIS — I15.1 HYPERTENSION DUE TO KIDNEY TRANSPLANT: ICD-10-CM

## 2019-03-28 DIAGNOSIS — Z94.0 HYPERTENSION DUE TO KIDNEY TRANSPLANT: ICD-10-CM

## 2019-03-28 DIAGNOSIS — D69.6 THROMBOCYTOPENIA (H): ICD-10-CM

## 2019-03-28 DIAGNOSIS — E55.9 VITAMIN D DEFICIENCY: ICD-10-CM

## 2019-03-28 PROCEDURE — G0463 HOSPITAL OUTPT CLINIC VISIT: HCPCS | Mod: ZF

## 2019-03-28 RX ORDER — TACROLIMUS 1 MG/1
CAPSULE ORAL
Qty: 60 CAPSULE | Refills: 11 | COMMUNITY
Start: 2019-03-28 | End: 2019-05-08

## 2019-03-28 RX ORDER — LISINOPRIL 10 MG/1
10 TABLET ORAL DAILY
Qty: 30 TABLET | Refills: 11 | Status: SHIPPED | OUTPATIENT
Start: 2019-03-28 | End: 2019-05-09 | Stop reason: SINTOL

## 2019-03-28 ASSESSMENT — PAIN SCALES - GENERAL: PAINLEVEL: NO PAIN (0)

## 2019-03-28 NOTE — PROGRESS NOTES
[facility]  Nephrology Clinic  Kidney/Pancreas Recipient  2019     Name: Cricket Chen  MRN: [id number]   Age: 38 year old  : 1980  Referring provider: David Roberto     CHRONIC TRANSPLANT NEPHROLOGY VISIT    Assessment and Plan:   # DDKT:    - Baseline Cr ~ 1.2-1.5; Stable, 1.4 on 2019.    - Proteinuria: Normal   - Date of DSA last checked: 2018 Latest DSA: No   - BK Viremia: Yes, stable low level.    - Kidney Tx Biopsy: Yes in 2018; this demonstrated no rejection and no BK.     # Liver Transplant:  - Patient was last seen by Dr. Galvan in 2019. At that time, he had a panic attack with shortness of breath and Dr. Galvan checked an EBV and CMV, both of which were negative.    - Liver function tests are normal.     # Immunosuppression: Tacrolimus immediate release (goal 4-6) and Mycophenolate mofetil (goal 1-3.5)   - Changes: Yes, will increase tacrolimus to 1.5mg in the morning and 1mg in the evening.     # Prophylaxis:    - PJP on Bactrim.     # Hypertension: Controlled; Goal BP: < 130/80   - Changes: Yes - will discontinue amlodipine and double the dosage of lisinopril (10mg daily).    - I suspect we may need to increase this dosage to 20mg daily in the future.     # Mineral Bone Disorder:    - Secondary renal hyperparathyroidism; PTH level is: Not checked recently   - Vitamin D; level is: Not checked recently   - Calcium; level is: Normal   - Phosphorus; level is: Not checked recently     # Electrolytes:   - Potassium; level: Normal  - Magnesium; level: Not checked recently  - Bicarbonate; level: Normal    # PTLD:    - Completed chemotherapy.   - Follow up with oncology in 2019.    - He had imaging in 2019 with no evidence of PTLD.     # Skin Cancer Risk:    - Discussed sun protection and recommend regular follow up with Dermatology.    # Medical Compliance: Yes    Follow-up: Return in about 6 months (around 2019) for Routine  Visit.     # Transplant History:  Etiology of kidney failure: Liver failure.  Tx: DDKT and liver transplant  Significant changes in immunosuppression: Yes, mycophenolate reduced due to history of BK viremia, prednisone started for immunsuppression during chemotherapy.  Significant transplant-related complications: BK viremia, EBV viremia, papillary thyroid cancer, and PTLD    Transplant Office Phone Number: [phone]    Assessment and plan was discussed with the patient and they voiced understanding and agreement.    Chief Complaint   Follow-up    History of Present Illness:  Cricket Chen is a 38 year old male with a history of end stage kidney disease secondary to HRS and end stage liver disease (due to alcoholic cirrhosis), status-post DDKT and liver transplant on 05/11/2016, who presents for follow-up. I last evaluated the patient on 08/06/2018; please see this note for further details. At that time, kidney function was stable. We elected to begin a prednisone taper and he has since discontinued prednisone altogether.     Today, the patient states he is doing well. He has put on some weight (approximately 10 pounds) and feels he is now a bit overweight. He was admitted for chest pain in the interim since our last visit, however work-up returned normal. He was placed on Buspar for anxiety which has been quite helpful. He continues to follow with cardiology. He denies any shortness of breath or chest pain. He denies any recent issues with diarrhea. Appetite is normal.      He does not check his blood pressures at home very often but states they typically run in the 130's systolic. He has noticed some mild swelling in his ankles and is attributing this to the use of amlodipine. He is inquiring whether or not he can discontinue amlodipine and try another medication.      Recent Hospitalizations:  [] No [x] Yes Admission for chest pain. Negative work-up. Was placed on anxiety medication which has been helpful.     New Medical Issues: [x] No [] Yes    Decreased energy: [x] No [] Yes    Chest pain or SOB with exertion:  [x] No [] Yes    Appetite change or weight change: [] No [x] Yes See above.    Nausea, vomiting or diarrhea:  [x] No [] Yes    Fever, sweats or chills: [x] No [] Yes    Leg swelling: [] No [x] Yes Mild.       Review of Systems:   A comprehensive review of systems was obtained and negative, except as noted in the HPI or past medical history.     Active Medications:     Current Outpatient Medications:      apixaban ANTICOAGULANT (ELIQUIS) 5 MG tablet, Take 1 tablet (5 mg) by mouth 2 times daily, Disp: 180 tablet, Rfl: 3     busPIRone (BUSPAR) 15 MG tablet, Take one tablet (15 mg) twice daily for anxiety., Disp: 180 tablet, Rfl: 0     desonide (DESOWEN) 0.05 % ointment, Apply topically 2 times daily, Disp: 15 g, Rfl: 3     hydrOXYzine (ATARAX) 50 MG tablet, Take 1-2 tablets ( mg) by mouth nightly as needed for anxiety, Disp: 90 tablet, Rfl: 1     ketoconazole (NIZORAL) 2 % cream, Twice daily to areas of rash on face, Disp: 60 g, Rfl: 1     ketoconazole (NIZORAL) 2 % shampoo, Use as a foaming face wash in shower daily, Disp: 120 mL, Rfl: 3     levothyroxine (SYNTHROID/LEVOTHROID) 137 MCG tablet, Take 1 tablet by mouth Monday - Saturday  and 1.5 tablets on Sunday, Disp: 96 tablet, Rfl: 3     lisinopril (PRINIVIL/ZESTRIL) 10 MG tablet, Take 1 tablet (10 mg) by mouth daily, Disp: 30 tablet, Rfl: 11     magnesium oxide (MAG-OX) 400 MG tablet, TAKE ONE TABLET BY MOUTH TWICE A DAY, Disp: 120 tablet, Rfl: 11     metoprolol succinate (TOPROL-XL) 25 MG 24 hr tablet, Take 1 tablet (25 mg) by mouth daily, Disp: 90 tablet, Rfl: 3     mycophenolate (GENERIC EQUIVALENT) 250 MG capsule, Take 1 capsule (250 mg) by mouth 2 times daily, Disp: 60 capsule, Rfl: 11     sulfamethoxazole-trimethoprim (BACTRIM/SEPTRA) 400-80 MG tablet, Take 1 tablet by mouth daily, Disp: 30 tablet, Rfl: 11     tacrolimus (GENERIC EQUIVALENT) 1 MG  capsule, Take 1.5 mg by mouth every morning and 1 mg by mouth every evening, Disp: 60 capsule, Rfl: 11     LORazepam (ATIVAN) 0.5 MG tablet, Take 1 tablet (0.5 mg) by mouth 2 times daily as needed (for panic attacks) (Patient not taking: Reported on 3/8/2019), Disp: 30 tablet, Rfl: 0      Allergies:   Hydromorphone      Active Medical Problems:  Patient Active Problem List   Diagnosis     Atrial flutter (H)     Complete transposition of great vessels     Esophageal varices (H)     Insomnia     Alcoholic hepatitis     History of alcohol abuse     History of transposition of great vessels     Depression     Thrombocytopenia (H)     Pain medication agreement     [facility]     Liver replaced by transplant (H)     Kidney replaced by transplant     Immunosuppressed status (H)     Hypomagnesemia     Secondary renal hyperparathyroidism (H)     Chronic pain syndrome     Pain management contract signed     Aftercare following organ transplant     Post-transplant lymphoproliferative disorder (H)     Papillary thyroid carcinoma (H)     Advance directive discussed with patient     Acute alcoholic liver disease     Alcohol dependence (H)     Encounter for palliative care     Hypertension secondary to other renal disorders     Atrial fibrillation (H)     Postoperative hypothyroidism     Arm mass, right     Dermatitis, seborrheic     Chest pain     Social History:   Social History     Tobacco Use     Smoking status: Former Smoker     Packs/day: 0.33     Years: 3.00     Pack years: 0.99     Types: Cigarettes     Start date: 1997     Last attempt to quit: 2000     Years since quittin.5     Smokeless tobacco: Former User     Tobacco comment: Quit chewing in early    Substance Use Topics     Alcohol use: No     Alcohol/week: 0.0 oz     Comment: ~10 drinks per day for ten years, quit in 2014     Drug use: No     Physical Exam:   /87    Pulse 63   Temp 98.5  F (36.9  C)   Wt 95.3 kg (210 lb)   SpO2 99%   BMI 29.29 kg/m     Wt Readings from Last 4 Encounters:   03/28/19 95.3 kg (210 lb)   03/08/19 94.3 kg (208 lb)   02/14/19 91.4 kg (201 lb 6.4 oz)   01/22/19 90.7 kg (200 lb)   GENERAL APPEARANCE: alert and no distress  HENT: mouth without ulcers or lesions  LYMPHATICS: no cervical or supraclavicular nodes  RESP: lungs clear to auscultation - no rales, rhonchi or wheezes  CV: regular rhythm, normal rate, no rub, no murmur  EDEMA: trace LE edema bilaterally  ABDOMEN: soft, nondistended, nontender, bowel sounds normal  MS: extremities normal - no gross deformities noted, no evidence of inflammation in joints, no muscle tenderness  SKIN: no rash  TX KIDNEY: normal    Data:   Renal Latest Ref Rng & Units 3/19/2019 3/5/2019 2/19/2019   Na 133 - 144 mmol/L 140 139 141   K 3.4 - 5.3 mmol/L 4.6 4.7 4.3   Cl 94 - 109 mmol/L 108 108 108   CO2 20 - 32 mmol/L 24 26 28   BUN 7 - 30 mg/dL 16 15 14   Cr 0.66 - 1.25 mg/dL 1.43(H) 1.35(H) 1.42(H)   Glucose 70 - 99 mg/dL 101(H) 121(H) 100(H)   Ca  8.5 - 10.1 mg/dL 9.3 9.0 9.5   Mg 1.6 - 2.3 mg/dL - - -     Bone Health Latest Ref Rng & Units 8/7/2017 8/7/2017 6/29/2017   Phos 2.5 - 4.5 mg/dL - - -   PTHi 12 - 72 pg/mL 167(H) 109(H) 173(H)   Vit D Def 20 - 75 ug/L - - 38     Heme Latest Ref Rng & Units 3/19/2019 2/19/2019 2/6/2019   WBC 4.0 - 11.0 10e9/L 5.3 4.5 4.6   Hgb 13.3 - 17.7 g/dL 15.7 15.9 15.0   Plt 150 - 450 10e9/L 130(L) 145(L) 131(L)     Liver Latest Ref Rng & Units 3/19/2019 2/25/2019 2/19/2019   AP 40 - 150 U/L 98 104 118   TBili 0.2 - 1.3 mg/dL 0.7 0.4 0.7   DBili 0.0 - 0.2 mg/dL 0.2 0.1 0.2   ALT 0 - 70 U/L 26 53 133(H)   AST 0 - 45 U/L 21 21 57(H)   Tot Protein 6.8 - 8.8 g/dL 7.3 7.8 7.5   Albumin 3.4 - 5.0 g/dL 4.0 4.5 4.1     Pancreas Latest Ref Rng & Units 3/6/2018 5/12/2016 5/10/2016   A1C 4.3 - 6.0 % - 5.1 -   Amylase 30 - 110 U/L - 47 100   Lipase 73 - 393 U/L 95 125 -     Iron studies  Latest Ref Rng & Units 7/14/2016 6/21/2014   Iron 35 - 180 ug/dL 84 101   Iron sat 15 - 46 % 29 71(H)   Ferritin 26 - 388 ng/mL 171 -     [facility] Txp Virology Latest Ref Rng & Units 3/19/2019 3/5/2019 2/6/2019   CVM DNA Quant - - - -   BK Spec - Plasma Plasma Plasma   BK Res BKNEG:BK Virus DNA Not Detected copies/mL 3,971(A) 2,569(A) 1,214(A)   BK Log <2.7 Log copies/mL 3.6(H) 3.4(H) 3.1(H)   Hep B Core NR - - -      Recent Labs   Lab Test 02/19/19  1109 03/05/19  1123 03/19/19  1124   DOSTAC Not Provided  11:15 PM ON 05/04/19 3/18/19 2315   TACROL 6.1 6.1 4.0*     Recent Labs   Lab Test 05/19/16  0714   DOSMPA 1,900   MPACID <0.25*   MPAG 51.0       Scribe Disclosure:   I, Gissel Yañez, am serving as a scribe to document services personally performed by Dr. Cedrick West at this visit, based upon the provider's statements to me. All documentation has been reviewed by the aforementioned provider prior to being entered into the official medical record.     Portions of this medical record were completed by a scribe. UPON MY REVIEW AND AUTHENTICATION BY ELECTRONIC SIGNATURE, this confirms (a) I performed the applicable clinical services, and (b) the record is accurate.

## 2019-03-28 NOTE — LETTER
3/28/2019      RE: Cricket Chen  [address]  [facility] [address]       [facility]  Nephrology Clinic  Kidney/Pancreas Recipient  2019     Name: Cricket Chen  MRN: [id number]   Age: 38 year old  : 1980  Referring provider: David Roberto     CHRONIC TRANSPLANT NEPHROLOGY VISIT    Assessment and Plan:   # DDKT:    - Baseline Cr ~ 1.2-1.5; Stable, 1.4 on 2019.    - Proteinuria: Normal   - Date of DSA last checked: 2018 Latest DSA: No   - BK Viremia: Yes, stable low level.    - Kidney Tx Biopsy: Yes in 2018; this demonstrated no rejection and no BK.     # Liver Transplant:  - Patient was last seen by Dr. Galvan in 2019. At that time, he had a panic attack with shortness of breath and Dr. Galvan checked an EBV and CMV, both of which were negative.    - Liver function tests are normal.     # Immunosuppression: Tacrolimus immediate release (goal 4-6) and Mycophenolate mofetil (goal 1-3.5)   - Changes: Yes, will increase tacrolimus to 1.5mg in the morning and 1mg in the evening.     # Prophylaxis:    - PJP on Bactrim.     # Hypertension: Controlled; Goal BP: < 130/80   - Changes: Yes - will discontinue amlodipine and double the dosage of lisinopril (10mg daily).    - I suspect we may need to increase this dosage to 20mg daily in the future.     # Mineral Bone Disorder:    - Secondary renal hyperparathyroidism; PTH level is: Not checked recently   - Vitamin D; level is: Not checked recently   - Calcium; level is: Normal   - Phosphorus; level is: Not checked recently     # Electrolytes:   - Potassium; level: Normal  - Magnesium; level: Not checked recently  - Bicarbonate; level: Normal    # PTLD:    - Completed chemotherapy.   - Follow up with oncology in 2019.    - He had imaging in 2019 with no evidence of PTLD.     # Skin Cancer Risk:    - Discussed sun protection and recommend regular follow up with Dermatology.    # Medical  Compliance: Yes    Follow-up: Return in about 6 months (around 9/28/2019) for Routine Visit.     # Transplant History:  Etiology of kidney failure: Liver failure.  Tx: DDKT and liver transplant  Significant changes in immunosuppression: Yes, mycophenolate reduced due to history of BK viremia, prednisone started for immunsuppression during chemotherapy.  Significant transplant-related complications: BK viremia, EBV viremia, papillary thyroid cancer, and PTLD    Transplant Office Phone Number: [phone]    Assessment and plan was discussed with the patient and they voiced understanding and agreement.    Chief Complaint   Follow-up    History of Present Illness:  Cricket Chen is a 38 year old male with a history of end stage kidney disease secondary to HRS and end stage liver disease (due to alcoholic cirrhosis), status-post DDKT and liver transplant on 05/11/2016, who presents for follow-up. I last evaluated the patient on 08/06/2018; please see this note for further details. At that time, kidney function was stable. We elected to begin a prednisone taper and he has since discontinued prednisone altogether.     Today, the patient states he is doing well. He has put on some weight (approximately 10 pounds) and feels he is now a bit overweight. He was admitted for chest pain in the interim since our last visit, however work-up returned normal. He was placed on Buspar for anxiety which has been quite helpful. He continues to follow with cardiology. He denies any shortness of breath or chest pain. He denies any recent issues with diarrhea. Appetite is normal.      He does not check his blood pressures at home very often but states they typically run in the 130's systolic. He has noticed some mild swelling in his ankles and is attributing this to the use of amlodipine. He is inquiring whether or not he can discontinue amlodipine and try another medication.      Recent Hospitalizations:  [] No [x] Yes Admission for  chest pain. Negative work-up. Was placed on anxiety medication which has been helpful.    New Medical Issues: [x] No [] Yes    Decreased energy: [x] No [] Yes    Chest pain or SOB with exertion:  [x] No [] Yes    Appetite change or weight change: [] No [x] Yes See above.    Nausea, vomiting or diarrhea:  [x] No [] Yes    Fever, sweats or chills: [x] No [] Yes    Leg swelling: [] No [x] Yes Mild.       Review of Systems:   A comprehensive review of systems was obtained and negative, except as noted in the HPI or past medical history.     Active Medications:     Current Outpatient Medications:      apixaban ANTICOAGULANT (ELIQUIS) 5 MG tablet, Take 1 tablet (5 mg) by mouth 2 times daily, Disp: 180 tablet, Rfl: 3     busPIRone (BUSPAR) 15 MG tablet, Take one tablet (15 mg) twice daily for anxiety., Disp: 180 tablet, Rfl: 0     desonide (DESOWEN) 0.05 % ointment, Apply topically 2 times daily, Disp: 15 g, Rfl: 3     hydrOXYzine (ATARAX) 50 MG tablet, Take 1-2 tablets ( mg) by mouth nightly as needed for anxiety, Disp: 90 tablet, Rfl: 1     ketoconazole (NIZORAL) 2 % cream, Twice daily to areas of rash on face, Disp: 60 g, Rfl: 1     ketoconazole (NIZORAL) 2 % shampoo, Use as a foaming face wash in shower daily, Disp: 120 mL, Rfl: 3     levothyroxine (SYNTHROID/LEVOTHROID) 137 MCG tablet, Take 1 tablet by mouth Monday - Saturday  and 1.5 tablets on Sunday, Disp: 96 tablet, Rfl: 3     lisinopril (PRINIVIL/ZESTRIL) 10 MG tablet, Take 1 tablet (10 mg) by mouth daily, Disp: 30 tablet, Rfl: 11     magnesium oxide (MAG-OX) 400 MG tablet, TAKE ONE TABLET BY MOUTH TWICE A DAY, Disp: 120 tablet, Rfl: 11     metoprolol succinate (TOPROL-XL) 25 MG 24 hr tablet, Take 1 tablet (25 mg) by mouth daily, Disp: 90 tablet, Rfl: 3     mycophenolate (GENERIC EQUIVALENT) 250 MG capsule, Take 1 capsule (250 mg) by mouth 2 times daily, Disp: 60 capsule, Rfl: 11     sulfamethoxazole-trimethoprim (BACTRIM/SEPTRA) 400-80 MG tablet, Take 1  tablet by mouth daily, Disp: 30 tablet, Rfl: 11     tacrolimus (GENERIC EQUIVALENT) 1 MG capsule, Take 1.5 mg by mouth every morning and 1 mg by mouth every evening, Disp: 60 capsule, Rfl: 11     LORazepam (ATIVAN) 0.5 MG tablet, Take 1 tablet (0.5 mg) by mouth 2 times daily as needed (for panic attacks) (Patient not taking: Reported on 3/8/2019), Disp: 30 tablet, Rfl: 0      Allergies:   Hydromorphone      Active Medical Problems:  Patient Active Problem List   Diagnosis     Atrial flutter (H)     Complete transposition of great vessels     Esophageal varices (H)     Insomnia     Alcoholic hepatitis     History of alcohol abuse     History of transposition of great vessels     Depression     Thrombocytopenia (H)     Pain medication agreement     [facility]     Liver replaced by transplant (H)     Kidney replaced by transplant     Immunosuppressed status (H)     Hypomagnesemia     Secondary renal hyperparathyroidism (H)     Chronic pain syndrome     Pain management contract signed     Aftercare following organ transplant     Post-transplant lymphoproliferative disorder (H)     Papillary thyroid carcinoma (H)     Advance directive discussed with patient     Acute alcoholic liver disease     Alcohol dependence (H)     Encounter for palliative care     Hypertension secondary to other renal disorders     Atrial fibrillation (H)     Postoperative hypothyroidism     Arm mass, right     Dermatitis, seborrheic     Chest pain     Social History:   Social History     Tobacco Use     Smoking status: Former Smoker     Packs/day: 0.33     Years: 3.00     Pack years: 0.99     Types: Cigarettes     Start date: 1997     Last attempt to quit: 2000     Years since quittin.5     Smokeless tobacco: Former User     Tobacco comment: Quit chewing in early    Substance Use Topics     Alcohol use: No     Alcohol/week: 0.0 oz     Comment: ~10 drinks per  day for ten years, quit in April of 2014     Drug use: No     Physical Exam:   /87   Pulse 63   Temp 98.5  F (36.9  C)   Wt 95.3 kg (210 lb)   SpO2 99%   BMI 29.29 kg/m      Wt Readings from Last 4 Encounters:   03/28/19 95.3 kg (210 lb)   03/08/19 94.3 kg (208 lb)   02/14/19 91.4 kg (201 lb 6.4 oz)   01/22/19 90.7 kg (200 lb)   GENERAL APPEARANCE: alert and no distress  HENT: mouth without ulcers or lesions  LYMPHATICS: no cervical or supraclavicular nodes  RESP: lungs clear to auscultation - no rales, rhonchi or wheezes  CV: regular rhythm, normal rate, no rub, no murmur  EDEMA: trace LE edema bilaterally  ABDOMEN: soft, nondistended, nontender, bowel sounds normal  MS: extremities normal - no gross deformities noted, no evidence of inflammation in joints, no muscle tenderness  SKIN: no rash  TX KIDNEY: normal    Data:   Renal Latest Ref Rng & Units 3/19/2019 3/5/2019 2/19/2019   Na 133 - 144 mmol/L 140 139 141   K 3.4 - 5.3 mmol/L 4.6 4.7 4.3   Cl 94 - 109 mmol/L 108 108 108   CO2 20 - 32 mmol/L 24 26 28   BUN 7 - 30 mg/dL 16 15 14   Cr 0.66 - 1.25 mg/dL 1.43(H) 1.35(H) 1.42(H)   Glucose 70 - 99 mg/dL 101(H) 121(H) 100(H)   Ca  8.5 - 10.1 mg/dL 9.3 9.0 9.5   Mg 1.6 - 2.3 mg/dL - - -     Bone Health Latest Ref Rng & Units 8/7/2017 8/7/2017 6/29/2017   Phos 2.5 - 4.5 mg/dL - - -   PTHi 12 - 72 pg/mL 167(H) 109(H) 173(H)   Vit D Def 20 - 75 ug/L - - 38     Heme Latest Ref Rng & Units 3/19/2019 2/19/2019 2/6/2019   WBC 4.0 - 11.0 10e9/L 5.3 4.5 4.6   Hgb 13.3 - 17.7 g/dL 15.7 15.9 15.0   Plt 150 - 450 10e9/L 130(L) 145(L) 131(L)     Liver Latest Ref Rng & Units 3/19/2019 2/25/2019 2/19/2019   AP 40 - 150 U/L 98 104 118   TBili 0.2 - 1.3 mg/dL 0.7 0.4 0.7   DBili 0.0 - 0.2 mg/dL 0.2 0.1 0.2   ALT 0 - 70 U/L 26 53 133(H)   AST 0 - 45 U/L 21 21 57(H)   Tot Protein 6.8 - 8.8 g/dL 7.3 7.8 7.5   Albumin 3.4 - 5.0 g/dL 4.0 4.5 4.1     Pancreas Latest Ref Rng & Units 3/6/2018 5/12/2016 5/10/2016   A1C 4.3 - 6.0 %  - 5.1 -   Amylase 30 - 110 U/L - 47 100   Lipase 73 - 393 U/L 95 125 -     Iron studies Latest Ref Rng & Units 7/14/2016 6/21/2014   Iron 35 - 180 ug/dL 84 101   Iron sat 15 - 46 % 29 71(H)   Ferritin 26 - 388 ng/mL 171 -     [facility] Txp Virology Latest Ref Rng & Units 3/19/2019 3/5/2019 2/6/2019   CVM DNA Quant - - - -   BK Spec - Plasma Plasma Plasma   BK Res BKNEG:BK Virus DNA Not Detected copies/mL 3,971(A) 2,569(A) 1,214(A)   BK Log <2.7 Log copies/mL 3.6(H) 3.4(H) 3.1(H)   Hep B Core NR - - -      Recent Labs   Lab Test 02/19/19  1109 03/05/19  1123 03/19/19  1124   DOSTAC Not Provided  11:15 PM ON 05/04/19 3/18/19 2315   TACROL 6.1 6.1 4.0*     Recent Labs   Lab Test 05/19/16  0714   DOSMPA 1,900   MPACID <0.25*   MPAG 51.0       Scribe Disclosure:   I, Gissel Yañez, am serving as a scribe to document services personally performed by Dr. Cedrick West at this visit, based upon the provider's statements to me. All documentation has been reviewed by the aforementioned provider prior to being entered into the official medical record.     Portions of this medical record were completed by a scribe. UPON MY REVIEW AND AUTHENTICATION BY ELECTRONIC SIGNATURE, this confirms (a) I performed the applicable clinical services, and (b) the record is accurate.        Kidney/Pancreas Recipient

## 2019-03-28 NOTE — NURSING NOTE
"Chief Complaint   Patient presents with     RECHECK     Follow Up Kidney TX     Vital signs:  Temp: 98.5  F (36.9  C)   BP: 135/87 Pulse: 63     SpO2: 99 %       Weight: 95.3 kg (210 lb)  Estimated body mass index is 29.29 kg/m  as calculated from the following:    Height as of 3/8/19: 1.803 m (5' 11\").    Weight as of this encounter: 95.3 kg (210 lb).        Jolene Ramirez    "

## 2019-03-29 ENCOUNTER — CARE COORDINATION (OUTPATIENT)
Dept: CARDIOLOGY | Facility: CLINIC | Age: 39
End: 2019-03-29

## 2019-03-29 NOTE — PROGRESS NOTES
Call placed to Cricket to discuss date of right heart catheterization.  Left voicemail with call back number to discuss.    Gagandeep Corado, RN  RN Care Coordinator  [facility]  [phone]

## 2019-04-01 DIAGNOSIS — Z94.0 KIDNEY TRANSPLANTED: Primary | ICD-10-CM

## 2019-04-02 DIAGNOSIS — Z94.0 KIDNEY TRANSPLANTED: ICD-10-CM

## 2019-04-02 DIAGNOSIS — I48.92 ATRIAL FLUTTER, UNSPECIFIED TYPE (H): ICD-10-CM

## 2019-04-02 LAB
ANION GAP SERPL CALCULATED.3IONS-SCNC: 7 MMOL/L (ref 3–14)
BUN SERPL-MCNC: 15 MG/DL (ref 7–30)
CALCIUM SERPL-MCNC: 9.3 MG/DL (ref 8.5–10.1)
CHLORIDE SERPL-SCNC: 108 MMOL/L (ref 94–109)
CO2 SERPL-SCNC: 24 MMOL/L (ref 20–32)
CREAT SERPL-MCNC: 1.31 MG/DL (ref 0.66–1.25)
ERYTHROCYTE [DISTWIDTH] IN BLOOD BY AUTOMATED COUNT: 13.2 % (ref 10–15)
GFR SERPL CREATININE-BSD FRML MDRD: 68 ML/MIN/{1.73_M2}
GLUCOSE SERPL-MCNC: 94 MG/DL (ref 70–99)
HCT VFR BLD AUTO: 44.7 % (ref 40–53)
HGB BLD-MCNC: 15.4 G/DL (ref 13.3–17.7)
MCH RBC QN AUTO: 30.4 PG (ref 26.5–33)
MCHC RBC AUTO-ENTMCNC: 34.5 G/DL (ref 31.5–36.5)
MCV RBC AUTO: 88 FL (ref 78–100)
PLATELET # BLD AUTO: 136 10E9/L (ref 150–450)
POTASSIUM SERPL-SCNC: 4.2 MMOL/L (ref 3.4–5.3)
RBC # BLD AUTO: 5.06 10E12/L (ref 4.4–5.9)
SODIUM SERPL-SCNC: 139 MMOL/L (ref 133–144)
TACROLIMUS BLD-MCNC: 5.1 UG/L (ref 5–15)
TME LAST DOSE: NORMAL H
WBC # BLD AUTO: 4.6 10E9/L (ref 4–11)

## 2019-04-02 PROCEDURE — 80048 BASIC METABOLIC PNL TOTAL CA: CPT | Performed by: INTERNAL MEDICINE

## 2019-04-02 PROCEDURE — 80197 ASSAY OF TACROLIMUS: CPT | Performed by: INTERNAL MEDICINE

## 2019-04-02 PROCEDURE — 36415 COLL VENOUS BLD VENIPUNCTURE: CPT | Performed by: INTERNAL MEDICINE

## 2019-04-02 PROCEDURE — 87799 DETECT AGENT NOS DNA QUANT: CPT | Performed by: INTERNAL MEDICINE

## 2019-04-02 PROCEDURE — 85027 COMPLETE CBC AUTOMATED: CPT | Performed by: INTERNAL MEDICINE

## 2019-04-02 RX ORDER — METOPROLOL SUCCINATE 25 MG/1
25 TABLET, EXTENDED RELEASE ORAL DAILY
Qty: 90 TABLET | Refills: 3 | Status: SHIPPED | OUTPATIENT
Start: 2019-04-02 | End: 2020-01-29

## 2019-04-03 LAB
BKV DNA # SPEC NAA+PROBE: 1155 COPIES/ML
BKV DNA SPEC NAA+PROBE-LOG#: 3.1 LOG COPIES/ML
SPECIMEN SOURCE: ABNORMAL

## 2019-04-08 NOTE — PROGRESS NOTES
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      Pt reports that he has been feeling well.  He denies any chest pain or pressure, sob/lucia, orthopnea, pnd, palpitations, syncope/presyncope, suzette or change in exercise tolerance.  He also denies any problems with his medications and reports compliance.         Past Medical History:   Diagnosis Date     Alcohol abuse     Last drink in Mid-April 2014     Anemia in ESRD (end-stage renal disease) (H)      Anxiety 2008     Atrial flutter (H) 2017     Cirrhosis (H)     S/P liver transplant     Depression      History of blood transfusion      History of transposition of great vessels     atrial switch at age 8 months old     Hypertension      Liver transplant recipient (H)     2016     Papillary thyroid carcinoma (H)      Pneumonia 11-15-14     Renal transplant recipient     2016     Varices, esophageal (H)        Past Surgical History:   Procedure Laterality Date     ANESTHESIA CARDIOVERSION N/A 3/7/2018    Procedure: ANESTHESIA CARDIOVERSION;  Cardioversion;  Surgeon: GENERIC ANESTHESIA PROVIDER;  Location:  OR     BENCH LIVER N/A 5/10/2016    Procedure: BENCH LIVER;  Surgeon: Ricky Deshpande MD;  [facility]     BIOPSY LYMPH NODE CERVICAL Right 1/26/2017    Procedure: BIOPSY LYMPH NODE CERVICAL;  Surgeon: Beka Soto MD;  [facility]     CARDIAC SURGERY  9-10-80     CREATE FISTULA ARTERIOVENOUS UPPER EXTREMITY Right 9/16/2014    Procedure: CREATE FISTULA ARTERIOVENOUS UPPER EXTREMITY;  Surgeon: Padmaja Eaton MD;  [facility]     CYSTOSCOPY, REMOVE STENT(S), COMBINED Right  6/22/2016    Procedure: COMBINED CYSTOSCOPY, REMOVE STENT(S);  Surgeon: Ricky Deshpande MD;  [facility]     EMBOLECTOMY UPPER EXTREMITY Right 8/17/2018    Procedure: EMBOLECTOMY UPPER EXTREMITY;  Repair Right Upper Arm Pseudo Aneursym ;  Surgeon: John Payton MD;  [facility]     ENT SURGERY       ESOPHAGOSCOPY, GASTROSCOPY, DUODENOSCOPY (EGD), COMBINED  5/30/2014    Procedure: COMBINED ESOPHAGOSCOPY, GASTROSCOPY, DUODENOSCOPY (EGD);  Surgeon: Guillaume Bautista MD;  [facility]     ESOPHAGOSCOPY, GASTROSCOPY, DUODENOSCOPY (EGD), COMBINED  9/30/14     ESOPHAGOSCOPY, GASTROSCOPY, DUODENOSCOPY (EGD), COMBINED Left 3/12/2015    Procedure: COMBINED ESOPHAGOSCOPY, GASTROSCOPY, DUODENOSCOPY (EGD);  Surgeon: Laureen Galvan MD;  Location:  GI     ESOPHAGOSCOPY, GASTROSCOPY, DUODENOSCOPY (EGD), COMBINED N/A 4/21/2016    Procedure: COMBINED ESOPHAGOSCOPY, GASTROSCOPY, DUODENOSCOPY (EGD);  Surgeon: Laureen Galvan MD;  Location:  GI     ESOPHAGOSCOPY, GASTROSCOPY, DUODENOSCOPY (EGD), COMBINED N/A 7/21/2016    Procedure: COMBINED ESOPHAGOSCOPY, GASTROSCOPY, DUODENOSCOPY (EGD);  Surgeon: Laureen Galvan MD;  Location:  GI     HC OR CATH ABLATION NON-CARDIAC ENDOVASCULAR  1990,2002    SVT     INSERT PORT VASCULAR ACCESS N/A 4/3/2017    Procedure: INSERT PORT VASCULAR ACCESS;  Surgeon: Rajendra Jacques PA-C;  [facility]     IR PORT REMOVAL LEFT  1/22/2019     LIGATE FISTULA ARTERIOVENOUS UPPER EXTREMITY Right 11/7/2017    Procedure: LIGATE FISTULA ARTERIOVENOUS UPPER EXTREMITY;  Revision of Right Arm Arteriovenous Fistula ;  Surgeon: Padmaja Eaton MD;  [facility]     PERCUTANEOUS BIOPSY KIDNEY Right 9/26/2018    Procedure: PERCUTANEOUS BIOPSY KIDNEY;  Right Kidney Biopsy;  Surgeon: Yuval Khan MD;  [facility]     PICC INSERTION  5/30/14    PICC line placement 5/30/14; Removal 6/9/2014     REMOVE PORT VASCULAR ACCESS Right  2019    Procedure: Right chest Port Removal;  Surgeon: Erasmo Ziegler PA-C;  Location:  OR     THORACIC SURGERY  1980    Transposition great arteries, repaired at 8 months     THYROIDECTOMY Right 2017    Procedure: THYROIDECTOMY;  Bilateral Total Thyroidectomy ;  Surgeon: Beka Soto MD;  [facility]     TRANSPLANT KIDNEY RECIPIENT  DONOR  5/10/2016    Procedure: TRANSPLANT KIDNEY RECIPIENT  DONOR;  Surgeon: Ricky Deshpande MD;  [facility]     TRANSPLANT LIVER RECIPIENT  DONOR N/A 5/10/2016    Procedure: TRANSPLANT LIVER RECIPIENT  DONOR;  Surgeon: Ricky Deshpande MD;  Location:  OR   As above, including HPI    Family History   Problem Relation Age of Onset     No Known Problems Brother      Arthritis Father      Hypertension Father      Hypertension Mother      Anxiety Disorder Mother      Hypertension Sister      No Known Problems Maternal Grandmother      No Known Problems Maternal Grandfather      No Known Problems Paternal Grandmother      No Known Problems Sister      No Known Problems Paternal Grandfather      Alcohol/Drug No family hx of      Gastrointestinal Disease No family hx of         no fam hx of liver disease or liver cancer       Meds  Reviewed      Allergies   Allergen Reactions     Hydromorphone Itching     ROS: 12 point ROS neg other than the symptoms noted above in the HPI.    /65, HR 72  General: appears comfortable, alert and articulate  Head: normocephalic, atraumatic  Eyes: anicteric sclera, EOMI  Neck: no adenopathy or masses, 2+ carotids without bruits  Orophyarynx: moist mucosa, no lesions, dentition intact  Heart: regular, normal S1, loud S2, no murmur, gallop, or rub, estimated JVP <10 cm  Lungs: clear, no rales or wheezing  Abdomen: soft, non-tender, bowel sounds present, no hepatomegaly  Extremities: no clubbing, cyanosis or edema  Neurological: normal speech and affect, no gross motor  deficits    Labs:  reviewed    Cardiac MRI 3/9/2017  1. Transposition of the great arteries with native atrioventricular   concordance and ventricular arterial discordance with the aorta anterior   to the pulmonary artery (D-transposition of the great arteries).  2. Status post interatrial baffle with the superior vena cava and inferior   vena cava baffled to the left (subpulmonic) ventricle without obstruction.    There is no evidence of a baffle leak.    3. Systemic right ventricle:  a. The right ventricle is hypertrophied.  b. Decreased systolic function with an ejection fraction of 33%.  c. Severe dilation of the right ventricle with an end-diastolic volume of   208 mL/m  (previously 188 mL/m ) and end-systolic volume of 140 mL/m    (previous 123 mL/m ).  4. The right ventricle connects with the anterior aorta.  The coronary   artery origins are usual for transposition.  Left-sided aortic arch with   measurements above.  5. The pulmonary venous baffle to the right ventricle is widely patent.  6. The subpulmonary left ventricle has normal systolic function with   decreased myocardial mass.  The left ventricle connects to the pulmonary   artery, which is posterior to the aorta.  7. No significant change from previous cardiac MRI of 9/4/2012 except an   increased size in the indexed right ventricular diastolic and systolic   Volumes    Echo (3/30/18):   Patient after atrial switch operation for complete transposition of the great arteries. Technically difficult study due to poor acoustic windows. No baffle obstruction or leaks seen. There is moderate right ventricular enlargement. Single plane right ventricular EF 35-40 %. Normal left ventricular systolic function. No outflow obstruction. Mild tricuspid regurgitation.    EKG (3/30:18): Sinus rhythm, RAD, RBBB, prominent RV forces with repolarization abnormality.      Echo 1/17/19  Patient after atrial switch operation for complete transposition of the  great  arteries. Technically difficult study due to poor acoustic windows. There is  moderate right ventricular enlargement. The right ventricular function is  qualitatively moderately depressed. Qualitivley the right ventricle function  is unchanged form the 3/30/2018 echo. Mild (1+) tricuspid valve insufficiency.  No obvious baffle obstruction or leaks seen. Qualitatively normal left  ventricular systolic function. Mild (1+) pulmonary valve insufficiency.  Limited visualization of the LPA suggests narrowing. RPA not seen.    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    1.  TGA s/p atrial switch now with stably depressed systemic ventricular function:  Although ventricular function is depressed this has been stable for the past several years.  In regard to heart failure medical regimen, his systemic ventricle is an anatomic right ventricle and there is no proven therapies to improve morbidity or mortality in right ventricular failure and especially systemic right ventricular failure.  As he is currently not experiencing any symptoms and does not have any evidence of volume overload on today's exam, thus we will not change his medication regimen at this point in time - will continue current doses of metoprolol xl and lisinopril. Would like for him to undergo a repeat RHC for ongoing baseline evaluation.      2.  H/o atrial flutter s/p ablation: Seen by congenital EP today.  Please see their note for detailed findings and plan.     Follow-up:  In 6 months unless RHC results dictate earlier f/u.      Francesca Alba  MD  Section Head - Advanced Heart Failure, Transplantation and Mechanical Circulatory Support  Director - Adult [facility]  Associate Professor of Medicine, [facility]    Answers for HPI/ROS submitted by the patient on 3/3/2019   General Symptoms: No  Skin Symptoms: No  HENT Symptoms: No  EYE SYMPTOMS: No  HEART SYMPTOMS: No  LUNG SYMPTOMS: No  INTESTINAL SYMPTOMS: No  URINARY SYMPTOMS: No  REPRODUCTIVE SYMPTOMS: No  SKELETAL SYMPTOMS: No  BLOOD SYMPTOMS: No  NERVOUS SYSTEM SYMPTOMS: No  MENTAL HEALTH SYMPTOMS: No

## 2019-04-15 ENCOUNTER — CARE COORDINATION (OUTPATIENT)
Dept: CARDIOLOGY | Facility: CLINIC | Age: 39
End: 2019-04-15

## 2019-04-15 DIAGNOSIS — I51.89 OTHER ILL-DEFINED HEART DISEASES: ICD-10-CM

## 2019-04-15 DIAGNOSIS — R06.02 SHORTNESS OF BREATH: Primary | ICD-10-CM

## 2019-04-15 RX ORDER — LIDOCAINE 40 MG/G
CREAM TOPICAL
Status: CANCELLED | OUTPATIENT
Start: 2019-04-15

## 2019-04-16 PROBLEM — I51.89 OTHER ILL-DEFINED HEART DISEASES: Status: ACTIVE | Noted: 2019-04-15

## 2019-04-16 RX ORDER — LIDOCAINE 40 MG/G
CREAM TOPICAL
Status: CANCELLED | OUTPATIENT
Start: 2019-04-16

## 2019-04-19 ENCOUNTER — APPOINTMENT (OUTPATIENT)
Dept: MEDSURG UNIT | Facility: CLINIC | Age: 39
End: 2019-04-19
Attending: INTERNAL MEDICINE
Payer: COMMERCIAL

## 2019-04-19 ENCOUNTER — APPOINTMENT (OUTPATIENT)
Dept: LAB | Facility: CLINIC | Age: 39
End: 2019-04-19
Attending: INTERNAL MEDICINE
Payer: COMMERCIAL

## 2019-04-19 ENCOUNTER — HOSPITAL ENCOUNTER (OUTPATIENT)
Facility: CLINIC | Age: 39
Discharge: HOME | End: 2019-04-19
Attending: INTERNAL MEDICINE | Admitting: INTERNAL MEDICINE
Payer: COMMERCIAL

## 2019-04-19 VITALS
RESPIRATION RATE: 16 BRPM | DIASTOLIC BLOOD PRESSURE: 91 MMHG | HEART RATE: 70 BPM | TEMPERATURE: 98 F | OXYGEN SATURATION: 100 % | SYSTOLIC BLOOD PRESSURE: 121 MMHG

## 2019-04-19 DIAGNOSIS — I51.89 OTHER ILL-DEFINED HEART DISEASES: ICD-10-CM

## 2019-04-19 DIAGNOSIS — R06.02 SHORTNESS OF BREATH: ICD-10-CM

## 2019-04-19 LAB
ANION GAP SERPL CALCULATED.3IONS-SCNC: 3 MMOL/L (ref 3–14)
BASOPHILS # BLD AUTO: 0 10E9/L (ref 0–0.2)
BASOPHILS NFR BLD AUTO: 0.8 %
BUN SERPL-MCNC: 25 MG/DL (ref 7–30)
CALCIUM SERPL-MCNC: 9.5 MG/DL (ref 8.5–10.1)
CHLORIDE SERPL-SCNC: 108 MMOL/L (ref 94–109)
CO2 SERPL-SCNC: 27 MMOL/L (ref 20–32)
CREAT SERPL-MCNC: 1.63 MG/DL (ref 0.66–1.25)
DIFFERENTIAL METHOD BLD: ABNORMAL
EOSINOPHIL # BLD AUTO: 0.1 10E9/L (ref 0–0.7)
EOSINOPHIL NFR BLD AUTO: 2 %
ERYTHROCYTE [DISTWIDTH] IN BLOOD BY AUTOMATED COUNT: 12.5 % (ref 10–15)
GFR SERPL CREATININE-BSD FRML MDRD: 52 ML/MIN/{1.73_M2}
GLUCOSE SERPL-MCNC: 100 MG/DL (ref 70–99)
HCT VFR BLD AUTO: 48.1 % (ref 40–53)
HGB BLD-MCNC: 15.7 G/DL (ref 13.3–17.7)
IMM GRANULOCYTES # BLD: 0 10E9/L (ref 0–0.4)
IMM GRANULOCYTES NFR BLD: 0.2 %
LYMPHOCYTES # BLD AUTO: 1.1 10E9/L (ref 0.8–5.3)
LYMPHOCYTES NFR BLD AUTO: 20.9 %
MCH RBC QN AUTO: 29.6 PG (ref 26.5–33)
MCHC RBC AUTO-ENTMCNC: 32.6 G/DL (ref 31.5–36.5)
MCV RBC AUTO: 91 FL (ref 78–100)
MONOCYTES # BLD AUTO: 0.6 10E9/L (ref 0–1.3)
MONOCYTES NFR BLD AUTO: 10.9 %
NEUTROPHILS # BLD AUTO: 3.3 10E9/L (ref 1.6–8.3)
NEUTROPHILS NFR BLD AUTO: 65.2 %
NRBC # BLD AUTO: 0 10*3/UL
NRBC BLD AUTO-RTO: 0 /100
PLATELET # BLD AUTO: 144 10E9/L (ref 150–450)
POTASSIUM SERPL-SCNC: 4.1 MMOL/L (ref 3.4–5.3)
RBC # BLD AUTO: 5.3 10E12/L (ref 4.4–5.9)
SODIUM SERPL-SCNC: 139 MMOL/L (ref 133–144)
WBC # BLD AUTO: 5.1 10E9/L (ref 4–11)

## 2019-04-19 PROCEDURE — 36415 COLL VENOUS BLD VENIPUNCTURE: CPT | Performed by: INTERNAL MEDICINE

## 2019-04-19 PROCEDURE — 93451 RIGHT HEART CATH: CPT | Performed by: INTERNAL MEDICINE

## 2019-04-19 PROCEDURE — C1894 INTRO/SHEATH, NON-LASER: HCPCS | Performed by: INTERNAL MEDICINE

## 2019-04-19 PROCEDURE — 25000125 ZZHC RX 250: Performed by: INTERNAL MEDICINE

## 2019-04-19 PROCEDURE — 40000166 ZZH STATISTIC PP CARE STAGE 1

## 2019-04-19 PROCEDURE — 27210794 ZZH OR GENERAL SUPPLY STERILE: Performed by: INTERNAL MEDICINE

## 2019-04-19 PROCEDURE — 85025 COMPLETE CBC W/AUTO DIFF WBC: CPT | Performed by: INTERNAL MEDICINE

## 2019-04-19 PROCEDURE — 80048 BASIC METABOLIC PNL TOTAL CA: CPT | Performed by: INTERNAL MEDICINE

## 2019-04-19 PROCEDURE — 93530 ZZHC RT HEART CATH FOR CONGENITAL ANOMALIES: CPT | Performed by: INTERNAL MEDICINE

## 2019-04-19 PROCEDURE — C1887 CATHETER, GUIDING: HCPCS | Performed by: INTERNAL MEDICINE

## 2019-04-19 RX ORDER — LIDOCAINE 40 MG/G
CREAM TOPICAL
Status: COMPLETED | OUTPATIENT
Start: 2019-04-19 | End: 2019-04-19

## 2019-04-19 RX ORDER — LIDOCAINE 40 MG/G
CREAM TOPICAL
Status: DISCONTINUED | OUTPATIENT
Start: 2019-04-19 | End: 2019-04-19 | Stop reason: HOSPADM

## 2019-04-19 RX ADMIN — LIDOCAINE: 40 CREAM TOPICAL at 08:02

## 2019-04-19 NOTE — Clinical Note
Potential access sites were evaluated for patency using ultrasound.   The right jugular vein was selected. Access was obtained under with Sonosite guidance using a micropuncture 21 guage needle with direct visualization of needle entry.

## 2019-04-19 NOTE — PROGRESS NOTES
Pt returned from right heart cath; awake and alert, right neck is dry and intact, denies pain. Pt walked back from cath lab, steady on his feet. Discharge instructions reviewed, stated understanding, copy to pt. Taking po water without problem, declined food. 0925--Discharged to [facility], no sedation with procedure.

## 2019-04-19 NOTE — DISCHARGE INSTRUCTIONS
[facility]                        Interventional Cardiology  Discharge Instructions   Post Right Heart Cath      AFTER YOU GO HOME:    DO drink plenty of fluids    DO resume your regular diet and medications unless otherwise instructed by your Primary Physician    Do Not scrub the procedure site vigorously    No lotion or powder to the puncture site for 3 days    CALL YOUR PRIMARY PHYSICIAN IF: You may resume all normal activity.  Monitor neck site for bleeding, swelling, or voice changes. If you notice bleeding or swelling immediately apply pressure to the site and call number below to speak with Cardiology Fellow.  If you experience any changes in your breathing you should call your doctor immediately or come to the closest Emergency Department.  Do not drive yourself.    ADDITIONAL INSTRUCTIONS: Medications: You are to resume all home medications including anticoagulation therapy unless otherwise advised by your primary cardiologist or nurse coordinator.    Follow Up: Per your primary cardiology team    If you have any questions or concerns regarding your procedure site please call [phone] at anytime and ask for Cardiology Fellow on call.  They are available 24 hours a day.  You may also contact the Cardiology Clinic after hours number at [phone].                                                       Telephone Numbers [phone] Monday-Friday 8:00 am to 4:30 pm    [phone] After 4:30 pm Monday-Friday, Weekends & Holidays  Ask for Interventional Cardiologist on call. Someone is on call 24 hours/day   [facility] toll free number [phone] Monday-Friday 8:00 am to 4:30 pm   [facility] Emergency Dept [phone]

## 2019-04-19 NOTE — IP AVS SNAPSHOT
[facility], [facility],  Heart Cath Lab  [address] [address]  Phone:  [phone]                                    After Visit Summary   4/19/2019    Cricket Chen    MRN: [id number]           After Visit Summary Signature Page    I have received my discharge instructions, and my questions have been answered. I have discussed any challenges I see with this plan with the nurse or doctor.    ..........................................................................................................................................  Patient/Patient Representative Signature      ..........................................................................................................................................  Patient Representative Print Name and Relationship to Patient    ..................................................               ................................................  Date                                   Time    ..........................................................................................................................................  Reviewed by Signature/Title    ...................................................              ..............................................  Date                                               Time          22EPIC Rev 08/18

## 2019-04-19 NOTE — PROGRESS NOTES
[facility]   Interventional Cardiology        Consenting/Education for Cardiac Cath Lab Procedure: Right Heart Catheterization     Patient understands we would like to perform .Right Heart Catheterization due to heart failure. This procedure will be performed by Dr. Wiggins.    The patient understands the following:     Right Heart Catheterization: A fine tube (catheter) is put into the vein of the groin/neck.  It is carefully passed along until it reaches the heart and then goes up into the blood vessels of the lungs. This is done to measure a variety of pressures in your heart and can tell us how well the heart is filling and emptying, as well as monitor fluid status.     No sedation is planned for this procedure. Patient also understands risks and complications of the procedure which include, but are not limited to bruising/swelling around the incision site, infection, bleeding, allergic reaction to local anesthetic, air embolism, arterial puncture, stroke, heart attack.       Patient verbalized understanding of risks and benefits and has elected to proceed with the procedure or procedures listed above.    Kailey Dhillon PA-C  [facility] Interventional Cardiology  [phone]

## 2019-04-30 ENCOUNTER — TELEPHONE (OUTPATIENT)
Dept: PHARMACY | Facility: CLINIC | Age: 39
End: 2019-04-30

## 2019-05-07 DIAGNOSIS — Z94.0 KIDNEY TRANSPLANTED: ICD-10-CM

## 2019-05-07 LAB
ANION GAP SERPL CALCULATED.3IONS-SCNC: 7 MMOL/L (ref 3–14)
BUN SERPL-MCNC: 23 MG/DL (ref 7–30)
CALCIUM SERPL-MCNC: 9.7 MG/DL (ref 8.5–10.1)
CHLORIDE SERPL-SCNC: 106 MMOL/L (ref 94–109)
CO2 SERPL-SCNC: 25 MMOL/L (ref 20–32)
CREAT SERPL-MCNC: 1.67 MG/DL (ref 0.66–1.25)
ERYTHROCYTE [DISTWIDTH] IN BLOOD BY AUTOMATED COUNT: 12.7 % (ref 10–15)
GFR SERPL CREATININE-BSD FRML MDRD: 51 ML/MIN/{1.73_M2}
GLUCOSE SERPL-MCNC: 98 MG/DL (ref 70–99)
HCT VFR BLD AUTO: 46.2 % (ref 40–53)
HGB BLD-MCNC: 15.8 G/DL (ref 13.3–17.7)
MCH RBC QN AUTO: 29.9 PG (ref 26.5–33)
MCHC RBC AUTO-ENTMCNC: 34.2 G/DL (ref 31.5–36.5)
MCV RBC AUTO: 87 FL (ref 78–100)
PLATELET # BLD AUTO: 142 10E9/L (ref 150–450)
POTASSIUM SERPL-SCNC: 4.5 MMOL/L (ref 3.4–5.3)
RBC # BLD AUTO: 5.29 10E12/L (ref 4.4–5.9)
SODIUM SERPL-SCNC: 138 MMOL/L (ref 133–144)
TACROLIMUS BLD-MCNC: 3.3 UG/L (ref 5–15)
TME LAST DOSE: ABNORMAL H
WBC # BLD AUTO: 4.8 10E9/L (ref 4–11)

## 2019-05-07 PROCEDURE — 80197 ASSAY OF TACROLIMUS: CPT | Performed by: FAMILY MEDICINE

## 2019-05-07 PROCEDURE — 87799 DETECT AGENT NOS DNA QUANT: CPT | Performed by: FAMILY MEDICINE

## 2019-05-07 PROCEDURE — 80048 BASIC METABOLIC PNL TOTAL CA: CPT | Performed by: FAMILY MEDICINE

## 2019-05-07 PROCEDURE — 85027 COMPLETE CBC AUTOMATED: CPT | Performed by: FAMILY MEDICINE

## 2019-05-07 PROCEDURE — 36415 COLL VENOUS BLD VENIPUNCTURE: CPT | Performed by: FAMILY MEDICINE

## 2019-05-08 ENCOUNTER — TELEPHONE (OUTPATIENT)
Dept: TRANSPLANT | Facility: CLINIC | Age: 39
End: 2019-05-08

## 2019-05-08 DIAGNOSIS — Z94.0 S/P KIDNEY TRANSPLANT: Primary | ICD-10-CM

## 2019-05-08 DIAGNOSIS — Z94.4 LIVER REPLACED BY TRANSPLANT (H): ICD-10-CM

## 2019-05-08 RX ORDER — TACROLIMUS 1 MG/1
1 CAPSULE ORAL EVERY 12 HOURS
Qty: 60 CAPSULE | Refills: 11 | Status: SHIPPED | OUTPATIENT
Start: 2019-05-08 | End: 2020-04-08

## 2019-05-08 RX ORDER — TACROLIMUS 0.5 MG/1
0.5 CAPSULE ORAL EVERY 12 HOURS
Qty: 60 CAPSULE | Refills: 11 | Status: SHIPPED | OUTPATIENT
Start: 2019-05-08 | End: 2020-04-08

## 2019-05-08 NOTE — TELEPHONE ENCOUNTER
ISSUE:  Creatinine elevated at 1.67    PLAN:  Call and assess hydration status.  How much water is he drinking per day?  Any recent illness, diarrhea, symptoms of a UTI, or medication changes?  Any increased intake of caffeine?  Recommend increasing hydration and repeating labs within 1 week     OUTCOME:  Spoke with Isidro who denies any recent illness, medication changes, or symptoms of a UTI.  He reports he has been having a little diarrhea, but not a lot.   He reports he consumes about 4 L of water per day and has 1 cup of coffee per day.   I told Isidro I would update  Dr. West and get back to him.

## 2019-05-08 NOTE — TELEPHONE ENCOUNTER
Per Dr West 3/28 progress note, pt's goal prograf 4-6. Left message for patient to return call to discuss.

## 2019-05-08 NOTE — TELEPHONE ENCOUNTER
Pt will increase to 1.5 mg every 12 hours. Pt repeated dose change and verbalizes understanding.   Pt reports he is feeling great.

## 2019-05-09 ENCOUNTER — TELEPHONE (OUTPATIENT)
Dept: TRANSPLANT | Facility: CLINIC | Age: 39
End: 2019-05-09

## 2019-05-09 DIAGNOSIS — Z94.0 KIDNEY REPLACED BY TRANSPLANT: Primary | ICD-10-CM

## 2019-05-09 LAB
BKV DNA # SPEC NAA+PROBE: <500 COPIES/ML
BKV DNA SPEC NAA+PROBE-LOG#: <2.7 LOG COPIES/ML
SPECIMEN SOURCE: ABNORMAL

## 2019-05-09 NOTE — TELEPHONE ENCOUNTER
Cedrick West MD Hasebroock, Rebecca, RN             Stop the lisinopril   Recheck labs weekly   Next set of labs check a UA and Urine protein   If the Cr is above 1.6 on the next check I recommend a kidney biopsy     -Viral    Previous Messages      ----- Message -----   From: Erna Bland, RN   Sent: 5/8/2019  10:56 AM   To: Cedrick West MD   Subject: recent labs                                       Hey Dr. West-   We haven't had anyone round this week because Dr. Austin is on inpatient and since your familiar with Isidro I thought I'd ask you for recommendations.       His creatinine has been trending up the last 2 lab draws, most recently 1.67.  His baseline is around 1.4.  He denies any illness, diarrhea, s/s of a UTI, or other med changes.  He reports he drinks almost 4 L per day and only has 1 cup of coffee.  He does have BK but it's low level.  He had a biopsy in Sept. That showed no rejection and mild arteriosclerosis.       Do you have any recommendations?     Betsy         PLAN:  Call placed to pt. He voices understanding to stop lisinopril and to repeat labs next week, including UPC and UA/UC. Good oral hydration has been reinforced. Rx updated, lab orders in place. He has been advised to call SOT if BP is running >160 systolic with the lisinopril coming off.   Pt verbalizes understanding of plan

## 2019-05-10 ENCOUNTER — OFFICE VISIT (OUTPATIENT)
Dept: ENDOCRINOLOGY | Facility: CLINIC | Age: 39
End: 2019-05-10
Payer: COMMERCIAL

## 2019-05-10 VITALS
WEIGHT: 207.3 LBS | HEART RATE: 60 BPM | BODY MASS INDEX: 28.91 KG/M2 | DIASTOLIC BLOOD PRESSURE: 95 MMHG | SYSTOLIC BLOOD PRESSURE: 151 MMHG

## 2019-05-10 DIAGNOSIS — E89.0 POSTOPERATIVE HYPOTHYROIDISM: Primary | ICD-10-CM

## 2019-05-10 DIAGNOSIS — Z94.0 KIDNEY REPLACED BY TRANSPLANT: ICD-10-CM

## 2019-05-10 DIAGNOSIS — C73 PAPILLARY THYROID CARCINOMA (H): ICD-10-CM

## 2019-05-10 DIAGNOSIS — E89.0 POSTOPERATIVE HYPOTHYROIDISM: ICD-10-CM

## 2019-05-10 LAB
ALBUMIN UR-MCNC: NEGATIVE MG/DL
ANION GAP SERPL CALCULATED.3IONS-SCNC: 7 MMOL/L (ref 3–14)
APPEARANCE UR: CLEAR
BILIRUB UR QL STRIP: NEGATIVE
BUN SERPL-MCNC: 22 MG/DL (ref 7–30)
CALCIUM SERPL-MCNC: 9 MG/DL (ref 8.5–10.1)
CHLORIDE SERPL-SCNC: 101 MMOL/L (ref 94–109)
CO2 SERPL-SCNC: 26 MMOL/L (ref 20–32)
COLOR UR AUTO: ABNORMAL
CREAT SERPL-MCNC: 1.49 MG/DL (ref 0.66–1.25)
CREAT UR-MCNC: 37 MG/DL
GFR SERPL CREATININE-BSD FRML MDRD: 58 ML/MIN/{1.73_M2}
GLUCOSE SERPL-MCNC: 85 MG/DL (ref 70–99)
GLUCOSE UR STRIP-MCNC: NEGATIVE MG/DL
HGB UR QL STRIP: NEGATIVE
KETONES UR STRIP-MCNC: NEGATIVE MG/DL
LEUKOCYTE ESTERASE UR QL STRIP: NEGATIVE
MUCOUS THREADS #/AREA URNS LPF: PRESENT /LPF
NITRATE UR QL: NEGATIVE
PH UR STRIP: 6 PH (ref 5–7)
POTASSIUM SERPL-SCNC: 4.2 MMOL/L (ref 3.4–5.3)
PROT UR-MCNC: <0.05 G/L
PROT/CREAT 24H UR: NORMAL G/G CR (ref 0–0.2)
RBC #/AREA URNS AUTO: 1 /HPF (ref 0–2)
SODIUM SERPL-SCNC: 134 MMOL/L (ref 133–144)
SOURCE: ABNORMAL
SP GR UR STRIP: 1 (ref 1–1.03)
T4 FREE SERPL-MCNC: 1.34 NG/DL (ref 0.76–1.46)
TSH SERPL DL<=0.005 MIU/L-ACNC: 0.5 MU/L (ref 0.4–4)
UROBILINOGEN UR STRIP-MCNC: 0 MG/DL (ref 0–2)
WBC #/AREA URNS AUTO: 0 /HPF (ref 0–5)

## 2019-05-10 RX ORDER — LEVOTHYROXINE SODIUM 137 UG/1
TABLET ORAL
Qty: 96 TABLET | Refills: 3 | Status: SHIPPED | OUTPATIENT
Start: 2019-05-10 | End: 2020-02-17

## 2019-05-10 ASSESSMENT — PAIN SCALES - GENERAL: PAINLEVEL: NO PAIN (0)

## 2019-05-10 NOTE — LETTER
5/10/2019       RE: Cricket Chen  [address]  [facility] [address]     Dear Colleague,    Thank you for referring your patient, Cricket Chen, to the [facility] ENDOCRINOLOGY at [facility]. Please see a copy of my visit note below.         Endocrinology Note         Cricket is a 38 year old male presents today for follow up post total thyroidectomy    HPI  Cricket Chen is a 38 years old male with hx of post liver and kidney transplant in 5/2016, posttransplant lymphoproliferative disorder, atrial flutter status post direct current cardioversion, hypertension who is here for follow up post total thyroidectomy    I saw him first time in March 2017 for hypermetabolic focus in the right lobe of the thyroid with a max SUV of 12.7 on PET that was noted during baseline evaluation for diffuse large B cell lymphoma. Otherwise no suspicious FDG uptake within the head and neck. He also has PET-avid right axillary lymphadenopathy measuring 1.3 cm but no evidence of FDG-avid areas throughout the rest of his body.     At that time, US thyroid showed 1 cm mid/inferior solid right thyroid nodule which likely corresponds to the area of FDG avidity on prior PET CT. Subsequent FNA of that nodule showed positive for PTC.     He ultimately went for total thyroidectomy on 8/7/2017 by . His postoperative course was uncomplicated. Pathology showed PTC 0.8 cm in the right lobe with negative margin for carcinoma, 0.2 cm follicular adenoma was also identify in the right lobe. In the left lobe, 0.1 cm of PTC with negative margin and 1.2 cm benign colloid cyst were noted. No lymphovascular invasion was identified.    Interim history  Last seen August 2018. He has been doing well. He is taking levothyroxine 137  g a day,1 pill for 6 days and 1.5 pills x1 day per week. Lab on 10/16/2018 showed TSH 1.16 and FT4 1.16. He denied difficulty swallowing or breathing. He denied any muscle  twitching, muscle cramping, hand numbness or tingling. He followed up with oncologist for PTLD which is currently in remission.     He followed with nephrologist who monitored his Cr. He was taken off lisinopril last week to see whether there is any improvement of Cr or not.     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    Past Medical History  Past Medical History:   Diagnosis Date     Alcohol abuse     Last drink in Mid-April 2014     Anemia in ESRD (end-stage renal disease) (H)      Anxiety 2008     Atrial flutter (H) 2017     Cirrhosis (H)     S/P liver transplant     Depression      History of blood transfusion      History of transposition of great vessels     atrial switch at age 8 months old     Hypertension      Liver transplant recipient (H)     2016     Papillary thyroid carcinoma (H)      Pneumonia 11-15-14     Renal transplant recipient     2016     Varices, esophageal (H)        Allergies  Allergies   Allergen Reactions     Hydromorphone Itching     Medications  Current Outpatient Medications   Medication Sig Dispense Refill     apixaban ANTICOAGULANT (ELIQUIS) 5 MG tablet Take 1 tablet (5 mg) by mouth 2 times daily 180 tablet 3     busPIRone (BUSPAR) 15 MG tablet Take one tablet (15 mg) twice daily for anxiety. 180 tablet 0     desonide (DESOWEN) 0.05 % ointment Apply topically 2 times daily 15 g 3     hydrOXYzine (ATARAX) 50 MG tablet Take 1-2 tablets ( mg) by mouth nightly as needed for anxiety 90 tablet 1     ketoconazole (NIZORAL) 2 % cream Twice daily to areas of rash on face 60 g 1     ketoconazole (NIZORAL) 2 % shampoo Use as a foaming face wash in shower daily 120 mL 3     levothyroxine (SYNTHROID/LEVOTHROID) 137 MCG tablet Take 1 tablet by mouth Monday - Saturday  and 1.5 tablets on Sunday 96 tablet 3     LORazepam (ATIVAN) 0.5 MG tablet Take 1 tablet (0.5 mg) by mouth 2 times daily as needed (for panic attacks) 30 tablet 0     magnesium oxide (MAG-OX) 400 MG tablet TAKE  ONE TABLET BY MOUTH TWICE A  tablet 11     metoprolol succinate ER (TOPROL-XL) 25 MG 24 hr tablet Take 1 tablet (25 mg) by mouth daily 90 tablet 3     mycophenolate (GENERIC EQUIVALENT) 250 MG capsule Take 1 capsule (250 mg) by mouth 2 times daily 60 capsule 11     sulfamethoxazole-trimethoprim (BACTRIM/SEPTRA) 400-80 MG tablet Take 1 tablet by mouth daily 30 tablet 11     tacrolimus (GENERIC EQUIVALENT) 0.5 MG capsule Take 1 capsule (0.5 mg) by mouth every 12 hours *total dose 1.5 mg every 12 hours 60 capsule 11     tacrolimus (GENERIC EQUIVALENT) 1 MG capsule Take 1 capsule (1 mg) by mouth every 12 hours *total dose 1.5 mg every 12 hours 60 capsule 11     Family History  family history includes Anxiety Disorder in his mother; Arthritis in his father; Hypertension in his father, mother, and sister; No Known Problems in his brother, maternal grandfather, maternal grandmother, paternal grandfather, paternal grandmother, and sister.   No family hx of thyroid cancer    Social History  Social History     Tobacco Use     Smoking status: Former Smoker     Packs/day: 0.33     Years: 3.00     Pack years: 0.99     Types: Cigarettes     Start date: 1997     Last attempt to quit: 2000     Years since quittin.6     Smokeless tobacco: Former User     Tobacco comment: Quit chewing in early    Substance Use Topics     Alcohol use: No     Alcohol/week: 0.0 oz     Comment: ~10 drinks per day for ten years, quit in 2014     Drug use: No     quit drinking  Non , no children    ROS  Constitutional: put on 10 lbs from last visit, good energy, no night sweat  Eyes: no vision change, diplopia or red eyes   Neck: no difficulty swallowing, no choking, no neck pain, no neck swelling  Cardiovascular: no chest pain, palpitations  Respiratory: no dyspnea, cough, shortness of breath or wheezing   GI: no nausea, vomiting, diarrhea or constipation, no abdominal pain   : no change in urine, no dysuria or  hematuria  Musculoskeletal: no joint or muscle pain or swelling   Integumentary: no concerning lesions  Neuro: no loss of strength or sensation, no numbness or tingling, no tremor, no dizziness, no headache   Endo: no polyuria or polydipsia, no temperature intolerance   Heme/Lymph: no concerning bumps, no bleeding problems   Allergy: no environmental allergies   Psych: no depression or anxiety     Physical Exam  BP (!) 151/95   Pulse 60   Wt 94 kg (207 lb 4.8 oz)   BMI 28.91 kg/m     Body mass index is 28.91 kg/m .  Constitutional: no distress, comfortable, pleasant   Eyes: anicteric, normal extra-ocular movements, no lid lag or retraction  Neck: well healed surgical scar at the upper part of the right neck and lower neck, no discrete nodule  Cardiovascular: regular rate and rhythm, normal S1 and S2, no murmurs  Respiratory: clear to auscultation, no wheezes or crackles, normal breath sounds   Gastrointestinal:  Surgical scar along right and left costal area, nontender, no hepatomegaly, no masses   Musculoskeletal: no edema   Skin: no jaundice   Neurological: cranial nerves intact, 2+ reflexes at patella , normal gait, no tremor on outstretched hands bilaterally  Psychological: appropriate mood   Lymphatic: no cervical  lymphadenopathy.    RESULTS  PET 2/4/2017  HEAD/NECK:  Hypermetabolic focus in the right lobe of the thyroid with a max SUV of 12.7. Otherwise no suspicious FDG uptake within the head and neck. There is a 3.0 x 2.2 cm postoperative collection in the right lateral  neck anterior to the sternocleidal mastoid muscle, lateral to the right submandibular gland.   No abnormality identified within the mucosal spaces of the neck. Tongue base is normal. No lymphadenopathy within the neck. Limited head CT is within normal limits.    IMPRESSION:   1. Hypermetabolic right axillary lymph node, suspicious for involvement by lymphoproliferative disease.  2. Hypermetabolic lesion in the right thyroid with a max  SUV of 12.7. No definite CT correlate identified. Ultrasound of the thyroid is recommended.  3. Indeterminant 2.2 cm area of decreased enhancement within the right lower quadrant transplant kidney. It does not appear to be  masslike. Ultrasound is recommended for further characterization   4. Postsurgical changes of liver and right lower quadrant kidney transplantation.  5. Transposition of great vessels with postsurgical changes of atrial baffle procedure resulting in a large, presumably intentional atrial septal defect.     US thyroid 3/2/2017  Thyroid parenchyma: Homogeneous  The right lobe of the thyroid measures: 1.6 x 1.6 x 4.5 cm   The thyroid isthmus measures: 0.2 cm   The left lobe of the thyroid measures: 1.6 x 1.1 x 4 cm      Right lobe:  Nodule 1:  Nodule measurement: 0.3 x 0.3 x 0.3 cm  Echogenicity: Predominantly isoechoic  Consistency: Mixed cystic and solid  Calcifications: no  Hypervascular: Minimal peripheral vascularity  Interval growth (>20%): No prior imaging available     Nodule 2:  Nodule measurement: 0.9 x 0.8 x 0.8 cm  Echogenicity: Hypoechoic  Consistency: solid  Calcifications: no  Hypervascular: yes  Interval growth (>20%): No prior imaging available     Isthmus: No nodule     Left Lobe:   Nodule 1:  Nodule measurement: 0.7 x 0.5 x 0.8 cm  Echogenicity: Hypoechoic  Consistency: cystic  Calcifications: no; nondependent echogenic focus with ringdown  artifact most compatible with inspissated colloid.  Hypervascular: no  Interval growth (>20%): No prior imaging available     Impression:  1.  Almost 1 cm mid/inferior solid right thyroid nodule which likely corresponds to the area of FDG avidity on prior PET CT. Consider FNA for further evaluation.  2.  Additional subcentimeter right thyroid nodule and left thyroid colloid cyst are noted    FNA right thyroid nodule 3/16/2017  Thyroid, right #2, ultrasound-guided fine needle aspiration:   Positive for malignancy.   Papillary thyroid carcinoma    Specimen Adequacy: Satisfactory for evaluation.    Surgical pathology 8/7/17  FINAL DIAGNOSIS:   A- Thyroid, right, lobectomy:   - Papillary thyroid microcarcinoma: 0.8 cm in greatest dimension   - Margins negative for carcinoma   - Perineural and vascular invasion not identified.   - Follicular adenoma, 0.2 cm in greatest dimension   - See tumor synopsis for details     B- Thyroid, left, lobectomy:   - Papillary thyroid microcarcinoma: 0.1 cm in greatest dimension   - Margins negative for carcinoma   - Perineural and vascular invasion not identified.   - Benign colloid cyst: 1.2 cm in greatest dimension   - See tumor synopsis for details     Report Name: Thyroid Gland - Resection   Status: Submitted     Part(s) Involved:   A: Thyroid, right     Synoptic Report:     SPECIMEN     Procedure:         - Total thyroidectomy     TUMOR     Histologic Type:         - Papillary carcinoma       Common Significant Variants:           - Classical (usual, conventional)     Tumor Size: 0.8 cm     Tumor Laterality:         - Right lobe         - Left lobe     Tumor Focality:         - Multifocal     Tumor Extent       Extrathyroidal Extension:           - Not identified     Accessory Tumor Findings       Angioinvasion (vascular invasion):           - Not identified       Lymphatic Invasion:           - Not identified       Perineural Invasion:           - Not identified     MARGINS     Margins:         - Margins uninvolved by carcinoma     LYMPH NODES     Regional Lymph Nodes:         - No nodes submitted or found     STAGE (PTNM)     TNM Descriptors:         - m (multiple primary tumors)     Primary Tumor (pT):         - pT1a:  Tumor 1 cm or less in greatest dimension limited to the   thyroid.     Regional Lymph Nodes (pN):         - pNX: Regional lymph nodes cannot be assessed     ADDITIONAL FINDINGS     Additional Pathologic Findings:         - Adenoma     US head/neck 7/3/2017  COMPARISON: CT neck 3/28/2017, ultrasound  thyroid 3/2/2017.     Lymph nodes are measured bilaterally with measurements given in transverse, AP, and length (craniocaudal) dimensions as follows:     Right:  Level 2: No lymph nodes identified.     Level 3:   1: 7 x 2 mm lymph node.     Level 4:  1: 7 x 5 x 11 mm lymph node.  2: 8 x 4 x 9 mm lymph node.     Level 5: No lymph nodes identified.  Level 6: No lymph nodes identified.  Level 7: No lymph nodes identified.     Left:  Level 2:   1: 9 x 5 x 6 mm lymph node.  2: 6 x 4 x 10 mm lymph node.     Level 3: No lymph nodes identified.  Level 4: No lymph nodes identified.  Level 5: No lymph nodes identified.  Level 6: No lymph nodes identified.  Level 7: No lymph nodes identified.     Thyroid: Thyroid nodules partially visualized, including the 8 mm nodule in the inferior right lobe, and an 8 mm hypoechoic left inferior nodule.      IMPRESSION:  1.  Lymph nodes as described without malignant features.  2.  Partially visualized thyroid nodules, not significantly changed from 3/16/2017.      9/27/2017: TSH 0.13, FT4 1.17, Tg 0.23, TgAb<0.4  2/9/2018: TSH 0.08, free T4 1.52, Tg 0.19, TgAb<0.4  3/6/2018: TSH 0.12, free T4 1.87  4/3/2018: TSH 0.39, free T4 1.36  6/12/2018: TSH 0.94, free T4 1.08  8/10/2018: TSH 2.3, FT4 1.29, Tg 0.33, TgAb <0.4  10/16/2018: TSH 1.16, FT4 1.16  1/17/2019: FT4 1.33    US neck 8/10/2018  Lymph nodes are measured bilaterally with measurements given in transverse, AP, and craniocaudal dimensions as follows:     Right:  Level 1: No lymphadenopathy  Level 2: No lymphadenopathy  Level 3: 8 x 3 x 10 mm lymph node with a preserved fatty hilum  Level 4: There are 3 lymph nodes with preserved fatty hilum measuring 1.1 x 0.6 x 1.2 cm, 0.8 x 0.3 x 0.9 cm, and 0.8 x 0.4 x 0.8 cm  Level 5: No lymphadenopathy  Level 6: No lymphadenopathy     Left:  Level 1: No lymphadenopathy  Level 2: There are 2 lymph nodes without definite fatty rom which measure 1.1 x 0.4 x 1.0 cm and 0.6 x 0.5 x 0.9 cm  Level 3:  No lymphadenopathy  Level 4: 0.7 x 0.3 x 2.1 cm lymph node with a preserved fatty hilum  Level 5: No lymphadenopathy  Level 6: No lymphadenopathy                                                                   IMPRESSION: Soft tissue neck ultrasound with lymph node measurements as described above. No pathologically enlarged or morphologically suspicious lymph nodes. There are two left level 2 lymph nodes which do not have a definite fatty hilum, although they are similar in size and appearance from 7/3/2017 and were not hypermetabolic on PET/CT 7/19/2017.    ASSESSMENT:    Cricket Chen is a 38 years old male with hx of post liver and kidney transplant in 5/2016, posttransplant lymphoproliferative disorder, atrial flutter status post direct current cardioversion, hypertension who is here for follow up post total thyroidectomy    1) multifocal micro PTC: he was noted to have thyroid cancer during evaluation of diffuse large B cell lymphoma. He was initially noted for hypermetabolic activity at right thyroid gland with SUV max of 12.7 from PET scan. Subsequent FNA of right thyroid nodule showed positive for thyroid cancer. He underwent total thyroidectomy without complications.  His prognosis is favorable given micro PTC, negative margin and no lymphovascular invasion.  Thyroglobulin in 8/2018 was 0.33 with negative thyroglobulin antibody.  I will monitor thyroglobulin for now.  We will plan to check lab, TSH, FT4, Tg, TgAb, today    2) postoperative hypothyroidism: clinically euthyroid.  Currently on levothyroxine 137 mcg, 1 pill x 6 days and 1.5 pills x1 day per week. Will check lab today    PLAN:   - lab for TSH, FT4, Tg, TgAb, ultrasound head and neck  - RTC 1 year       Zuleyma Gray MD     Division of Diabetes and Endocrinology  Department of Medicine  [phone]

## 2019-05-10 NOTE — PROGRESS NOTES
Endocrinology Note         Cricket is a 38 year old male presents today for follow up post total thyroidectomy    HPI  Cricket Chen is a 38 years old male with hx of post liver and kidney transplant in 5/2016, posttransplant lymphoproliferative disorder, atrial flutter status post direct current cardioversion, hypertension who is here for follow up post total thyroidectomy    I saw him first time in March 2017 for hypermetabolic focus in the right lobe of the thyroid with a max SUV of 12.7 on PET that was noted during baseline evaluation for diffuse large B cell lymphoma. Otherwise no suspicious FDG uptake within the head and neck. He also has PET-avid right axillary lymphadenopathy measuring 1.3 cm but no evidence of FDG-avid areas throughout the rest of his body.     At that time, US thyroid showed 1 cm mid/inferior solid right thyroid nodule which likely corresponds to the area of FDG avidity on prior PET CT. Subsequent FNA of that nodule showed positive for PTC.     He ultimately went for total thyroidectomy on 8/7/2017 by . His postoperative course was uncomplicated. Pathology showed PTC 0.8 cm in the right lobe with negative margin for carcinoma, 0.2 cm follicular adenoma was also identify in the right lobe. In the left lobe, 0.1 cm of PTC with negative margin and 1.2 cm benign colloid cyst were noted. No lymphovascular invasion was identified.    Interim history  Last seen August 2018. He has been doing well. He is taking levothyroxine 137  g a day,1 pill for 6 days and 1.5 pills x1 day per week. Lab on 10/16/2018 showed TSH 1.16 and FT4 1.16. He denied difficulty swallowing or breathing. He denied any muscle twitching, muscle cramping, hand numbness or tingling. He followed up with oncologist for PTLD which is currently in remission.     He followed with nephrologist who monitored his Cr. He was taken off lisinopril last week to see whether there is any improvement of Cr or not.     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    Past Medical History  Past Medical History:   Diagnosis Date     Alcohol abuse     Last drink in Mid-April 2014     Anemia in ESRD (end-stage renal disease) (H)      Anxiety 2008     Atrial flutter (H) 2017     Cirrhosis (H)     S/P liver transplant     Depression      History of blood transfusion      History of transposition of great vessels     atrial switch at age 8 months old     Hypertension      Liver transplant recipient (H)     2016     Papillary thyroid carcinoma (H)      Pneumonia 11-15-14     Renal transplant recipient     2016     Varices, esophageal (H)        Allergies  Allergies   Allergen Reactions     Hydromorphone Itching     Medications  Current Outpatient Medications   Medication Sig Dispense Refill     apixaban ANTICOAGULANT (ELIQUIS) 5 MG tablet Take 1 tablet (5 mg) by mouth 2 times daily 180 tablet 3     busPIRone (BUSPAR) 15 MG tablet Take one tablet (15 mg) twice daily for anxiety. 180 tablet 0     desonide (DESOWEN) 0.05 % ointment Apply topically 2 times daily 15 g 3     hydrOXYzine (ATARAX) 50 MG tablet Take 1-2 tablets ( mg) by mouth nightly as needed for anxiety 90 tablet 1     ketoconazole (NIZORAL) 2 % cream Twice daily to areas of rash on face 60 g 1     ketoconazole (NIZORAL) 2 % shampoo Use as a foaming face wash in shower daily 120 mL 3     levothyroxine (SYNTHROID/LEVOTHROID) 137 MCG tablet Take 1 tablet by mouth Monday - Saturday  and 1.5 tablets on Sunday 96 tablet 3     LORazepam (ATIVAN) 0.5 MG tablet Take 1 tablet (0.5 mg) by mouth 2 times daily as needed (for panic attacks) 30 tablet 0     magnesium oxide (MAG-OX) 400 MG tablet TAKE ONE TABLET BY MOUTH TWICE A  tablet 11     metoprolol succinate ER (TOPROL-XL) 25 MG 24 hr tablet Take 1 tablet (25 mg) by mouth daily 90 tablet 3     mycophenolate (GENERIC EQUIVALENT) 250 MG capsule Take 1 capsule (250 mg) by mouth 2 times daily 60 capsule 11      sulfamethoxazole-trimethoprim (BACTRIM/SEPTRA) 400-80 MG tablet Take 1 tablet by mouth daily 30 tablet 11     tacrolimus (GENERIC EQUIVALENT) 0.5 MG capsule Take 1 capsule (0.5 mg) by mouth every 12 hours *total dose 1.5 mg every 12 hours 60 capsule 11     tacrolimus (GENERIC EQUIVALENT) 1 MG capsule Take 1 capsule (1 mg) by mouth every 12 hours *total dose 1.5 mg every 12 hours 60 capsule 11     Family History  family history includes Anxiety Disorder in his mother; Arthritis in his father; Hypertension in his father, mother, and sister; No Known Problems in his brother, maternal grandfather, maternal grandmother, paternal grandfather, paternal grandmother, and sister.   No family hx of thyroid cancer    Social History  Social History     Tobacco Use     Smoking status: Former Smoker     Packs/day: 0.33     Years: 3.00     Pack years: 0.99     Types: Cigarettes     Start date: 1997     Last attempt to quit: 2000     Years since quittin.6     Smokeless tobacco: Former User     Tobacco comment: Quit chewing in early    Substance Use Topics     Alcohol use: No     Alcohol/week: 0.0 oz     Comment: ~10 drinks per day for ten years, quit in 2014     Drug use: No     quit drinking  Non , no children    ROS  Constitutional: put on 10 lbs from last visit, good energy, no night sweat  Eyes: no vision change, diplopia or red eyes   Neck: no difficulty swallowing, no choking, no neck pain, no neck swelling  Cardiovascular: no chest pain, palpitations  Respiratory: no dyspnea, cough, shortness of breath or wheezing   GI: no nausea, vomiting, diarrhea or constipation, no abdominal pain   : no change in urine, no dysuria or hematuria  Musculoskeletal: no joint or muscle pain or swelling   Integumentary: no concerning lesions  Neuro: no loss of strength or sensation, no numbness or tingling, no tremor, no dizziness, no headache   Endo: no polyuria or polydipsia, no temperature intolerance    Heme/Lymph: no concerning bumps, no bleeding problems   Allergy: no environmental allergies   Psych: no depression or anxiety     Physical Exam  BP (!) 151/95   Pulse 60   Wt 94 kg (207 lb 4.8 oz)   BMI 28.91 kg/m    Body mass index is 28.91 kg/m .  Constitutional: no distress, comfortable, pleasant   Eyes: anicteric, normal extra-ocular movements, no lid lag or retraction  Neck: well healed surgical scar at the upper part of the right neck and lower neck, no discrete nodule  Cardiovascular: regular rate and rhythm, normal S1 and S2, no murmurs  Respiratory: clear to auscultation, no wheezes or crackles, normal breath sounds   Gastrointestinal:  Surgical scar along right and left costal area, nontender, no hepatomegaly, no masses   Musculoskeletal: no edema   Skin: no jaundice   Neurological: cranial nerves intact, 2+ reflexes at patella , normal gait, no tremor on outstretched hands bilaterally  Psychological: appropriate mood   Lymphatic: no cervical  lymphadenopathy.    RESULTS  PET 2/4/2017  HEAD/NECK:  Hypermetabolic focus in the right lobe of the thyroid with a max SUV of 12.7. Otherwise no suspicious FDG uptake within the head and neck. There is a 3.0 x 2.2 cm postoperative collection in the right lateral  neck anterior to the sternocleidal mastoid muscle, lateral to the right submandibular gland.   No abnormality identified within the mucosal spaces of the neck. Tongue base is normal. No lymphadenopathy within the neck. Limited head CT is within normal limits.    IMPRESSION:   1. Hypermetabolic right axillary lymph node, suspicious for involvement by lymphoproliferative disease.  2. Hypermetabolic lesion in the right thyroid with a max SUV of 12.7. No definite CT correlate identified. Ultrasound of the thyroid is recommended.  3. Indeterminant 2.2 cm area of decreased enhancement within the right lower quadrant transplant kidney. It does not appear to be  masslike. Ultrasound is recommended for further  characterization   4. Postsurgical changes of liver and right lower quadrant kidney transplantation.  5. Transposition of great vessels with postsurgical changes of atrial baffle procedure resulting in a large, presumably intentional atrial septal defect.     US thyroid 3/2/2017  Thyroid parenchyma: Homogeneous  The right lobe of the thyroid measures: 1.6 x 1.6 x 4.5 cm   The thyroid isthmus measures: 0.2 cm   The left lobe of the thyroid measures: 1.6 x 1.1 x 4 cm      Right lobe:  Nodule 1:  Nodule measurement: 0.3 x 0.3 x 0.3 cm  Echogenicity: Predominantly isoechoic  Consistency: Mixed cystic and solid  Calcifications: no  Hypervascular: Minimal peripheral vascularity  Interval growth (>20%): No prior imaging available     Nodule 2:  Nodule measurement: 0.9 x 0.8 x 0.8 cm  Echogenicity: Hypoechoic  Consistency: solid  Calcifications: no  Hypervascular: yes  Interval growth (>20%): No prior imaging available     Isthmus: No nodule     Left Lobe:   Nodule 1:  Nodule measurement: 0.7 x 0.5 x 0.8 cm  Echogenicity: Hypoechoic  Consistency: cystic  Calcifications: no; nondependent echogenic focus with ringdown  artifact most compatible with inspissated colloid.  Hypervascular: no  Interval growth (>20%): No prior imaging available     Impression:  1.  Almost 1 cm mid/inferior solid right thyroid nodule which likely corresponds to the area of FDG avidity on prior PET CT. Consider FNA for further evaluation.  2.  Additional subcentimeter right thyroid nodule and left thyroid colloid cyst are noted    FNA right thyroid nodule 3/16/2017  Thyroid, right #2, ultrasound-guided fine needle aspiration:   Positive for malignancy.   Papillary thyroid carcinoma   Specimen Adequacy: Satisfactory for evaluation.    Surgical pathology 8/7/17  FINAL DIAGNOSIS:   A- Thyroid, right, lobectomy:   - Papillary thyroid microcarcinoma: 0.8 cm in greatest dimension   - Margins negative for carcinoma   - Perineural and vascular invasion not  identified.   - Follicular adenoma, 0.2 cm in greatest dimension   - See tumor synopsis for details     B- Thyroid, left, lobectomy:   - Papillary thyroid microcarcinoma: 0.1 cm in greatest dimension   - Margins negative for carcinoma   - Perineural and vascular invasion not identified.   - Benign colloid cyst: 1.2 cm in greatest dimension   - See tumor synopsis for details     Report Name: Thyroid Gland - Resection   Status: Submitted     Part(s) Involved:   A: Thyroid, right     Synoptic Report:     SPECIMEN     Procedure:         - Total thyroidectomy     TUMOR     Histologic Type:         - Papillary carcinoma       Common Significant Variants:           - Classical (usual, conventional)     Tumor Size: 0.8 cm     Tumor Laterality:         - Right lobe         - Left lobe     Tumor Focality:         - Multifocal     Tumor Extent       Extrathyroidal Extension:           - Not identified     Accessory Tumor Findings       Angioinvasion (vascular invasion):           - Not identified       Lymphatic Invasion:           - Not identified       Perineural Invasion:           - Not identified     MARGINS     Margins:         - Margins uninvolved by carcinoma     LYMPH NODES     Regional Lymph Nodes:         - No nodes submitted or found     STAGE (PTNM)     TNM Descriptors:         - m (multiple primary tumors)     Primary Tumor (pT):         - pT1a:  Tumor 1 cm or less in greatest dimension limited to the   thyroid.     Regional Lymph Nodes (pN):         - pNX: Regional lymph nodes cannot be assessed     ADDITIONAL FINDINGS     Additional Pathologic Findings:         - Adenoma     US head/neck 7/3/2017  COMPARISON: CT neck 3/28/2017, ultrasound thyroid 3/2/2017.     Lymph nodes are measured bilaterally with measurements given in transverse, AP, and length (craniocaudal) dimensions as follows:     Right:  Level 2: No lymph nodes identified.     Level 3:   1: 7 x 2 mm lymph node.     Level 4:  1: 7 x 5 x 11 mm lymph  node.  2: 8 x 4 x 9 mm lymph node.     Level 5: No lymph nodes identified.  Level 6: No lymph nodes identified.  Level 7: No lymph nodes identified.     Left:  Level 2:   1: 9 x 5 x 6 mm lymph node.  2: 6 x 4 x 10 mm lymph node.     Level 3: No lymph nodes identified.  Level 4: No lymph nodes identified.  Level 5: No lymph nodes identified.  Level 6: No lymph nodes identified.  Level 7: No lymph nodes identified.     Thyroid: Thyroid nodules partially visualized, including the 8 mm nodule in the inferior right lobe, and an 8 mm hypoechoic left inferior nodule.      IMPRESSION:  1.  Lymph nodes as described without malignant features.  2.  Partially visualized thyroid nodules, not significantly changed from 3/16/2017.      9/27/2017: TSH 0.13, FT4 1.17, Tg 0.23, TgAb<0.4  2/9/2018: TSH 0.08, free T4 1.52, Tg 0.19, TgAb<0.4  3/6/2018: TSH 0.12, free T4 1.87  4/3/2018: TSH 0.39, free T4 1.36  6/12/2018: TSH 0.94, free T4 1.08  8/10/2018: TSH 2.3, FT4 1.29, Tg 0.33, TgAb <0.4  10/16/2018: TSH 1.16, FT4 1.16  1/17/2019: FT4 1.33    US neck 8/10/2018  Lymph nodes are measured bilaterally with measurements given in transverse, AP, and craniocaudal dimensions as follows:     Right:  Level 1: No lymphadenopathy  Level 2: No lymphadenopathy  Level 3: 8 x 3 x 10 mm lymph node with a preserved fatty hilum  Level 4: There are 3 lymph nodes with preserved fatty hilum measuring 1.1 x 0.6 x 1.2 cm, 0.8 x 0.3 x 0.9 cm, and 0.8 x 0.4 x 0.8 cm  Level 5: No lymphadenopathy  Level 6: No lymphadenopathy     Left:  Level 1: No lymphadenopathy  Level 2: There are 2 lymph nodes without definite fatty rom which measure 1.1 x 0.4 x 1.0 cm and 0.6 x 0.5 x 0.9 cm  Level 3: No lymphadenopathy  Level 4: 0.7 x 0.3 x 2.1 cm lymph node with a preserved fatty hilum  Level 5: No lymphadenopathy  Level 6: No lymphadenopathy                                                                   IMPRESSION: Soft tissue neck ultrasound with lymph node  measurements as described above. No pathologically enlarged or morphologically suspicious lymph nodes. There are two left level 2 lymph nodes which do not have a definite fatty hilum, although they are similar in size and appearance from 7/3/2017 and were not hypermetabolic on PET/CT 7/19/2017.    ASSESSMENT:    Cricket Chen is a 38 years old male with hx of post liver and kidney transplant in 5/2016, posttransplant lymphoproliferative disorder, atrial flutter status post direct current cardioversion, hypertension who is here for follow up post total thyroidectomy    1) multifocal micro PTC: he was noted to have thyroid cancer during evaluation of diffuse large B cell lymphoma. He was initially noted for hypermetabolic activity at right thyroid gland with SUV max of 12.7 from PET scan. Subsequent FNA of right thyroid nodule showed positive for thyroid cancer. He underwent total thyroidectomy without complications.  His prognosis is favorable given micro PTC, negative margin and no lymphovascular invasion.  Thyroglobulin in 8/2018 was 0.33 with negative thyroglobulin antibody.  I will monitor thyroglobulin for now.  We will plan to check lab, TSH, FT4, Tg, TgAb, today    2) postoperative hypothyroidism: clinically euthyroid.  Currently on levothyroxine 137 mcg, 1 pill x 6 days and 1.5 pills x1 day per week. Will check lab today    PLAN:   - lab for TSH, FT4, Tg, TgAb, ultrasound head and neck  - RTC 1 year       Zuleyma Gray MD     Division of Diabetes and Endocrinology  Department of Medicine  [phone]

## 2019-05-13 ENCOUNTER — ANCILLARY PROCEDURE (OUTPATIENT)
Dept: ULTRASOUND IMAGING | Facility: CLINIC | Age: 39
End: 2019-05-13
Attending: INTERNAL MEDICINE
Payer: COMMERCIAL

## 2019-05-13 DIAGNOSIS — C73 PAPILLARY THYROID CARCINOMA (H): ICD-10-CM

## 2019-05-13 DIAGNOSIS — E89.0 POSTOPERATIVE HYPOTHYROIDISM: ICD-10-CM

## 2019-05-17 LAB — LAB SCANNED RESULT: NORMAL

## 2019-06-04 DIAGNOSIS — Z94.0 KIDNEY TRANSPLANTED: ICD-10-CM

## 2019-06-04 LAB
ANION GAP SERPL CALCULATED.3IONS-SCNC: 9 MMOL/L (ref 3–14)
BUN SERPL-MCNC: 29 MG/DL (ref 7–30)
CALCIUM SERPL-MCNC: 8.9 MG/DL (ref 8.5–10.1)
CHLORIDE SERPL-SCNC: 104 MMOL/L (ref 94–109)
CO2 SERPL-SCNC: 22 MMOL/L (ref 20–32)
CREAT SERPL-MCNC: 1.52 MG/DL (ref 0.66–1.25)
ERYTHROCYTE [DISTWIDTH] IN BLOOD BY AUTOMATED COUNT: 12.9 % (ref 10–15)
GFR SERPL CREATININE-BSD FRML MDRD: 57 ML/MIN/{1.73_M2}
GLUCOSE SERPL-MCNC: 101 MG/DL (ref 70–99)
HCT VFR BLD AUTO: 41.7 % (ref 40–53)
HGB BLD-MCNC: 14.3 G/DL (ref 13.3–17.7)
MCH RBC QN AUTO: 29.9 PG (ref 26.5–33)
MCHC RBC AUTO-ENTMCNC: 34.3 G/DL (ref 31.5–36.5)
MCV RBC AUTO: 87 FL (ref 78–100)
PLATELET # BLD AUTO: 145 10E9/L (ref 150–450)
POTASSIUM SERPL-SCNC: 4.1 MMOL/L (ref 3.4–5.3)
RBC # BLD AUTO: 4.79 10E12/L (ref 4.4–5.9)
SODIUM SERPL-SCNC: 135 MMOL/L (ref 133–144)
TACROLIMUS BLD-MCNC: 4.7 UG/L (ref 5–15)
TME LAST DOSE: ABNORMAL H
WBC # BLD AUTO: 4.9 10E9/L (ref 4–11)

## 2019-06-04 PROCEDURE — 80197 ASSAY OF TACROLIMUS: CPT | Performed by: INTERNAL MEDICINE

## 2019-06-04 PROCEDURE — 36415 COLL VENOUS BLD VENIPUNCTURE: CPT | Performed by: INTERNAL MEDICINE

## 2019-06-04 PROCEDURE — 80048 BASIC METABOLIC PNL TOTAL CA: CPT | Performed by: INTERNAL MEDICINE

## 2019-06-04 PROCEDURE — 85027 COMPLETE CBC AUTOMATED: CPT | Performed by: INTERNAL MEDICINE

## 2019-06-04 PROCEDURE — 87799 DETECT AGENT NOS DNA QUANT: CPT | Performed by: INTERNAL MEDICINE

## 2019-06-06 ENCOUNTER — MYC MEDICAL ADVICE (OUTPATIENT)
Dept: FAMILY MEDICINE | Facility: CLINIC | Age: 39
End: 2019-06-06

## 2019-06-06 DIAGNOSIS — F41.9 ANXIETY: ICD-10-CM

## 2019-06-06 RX ORDER — BUSPIRONE HYDROCHLORIDE 15 MG/1
TABLET ORAL
Qty: 180 TABLET | Refills: 0 | Status: SHIPPED | OUTPATIENT
Start: 2019-06-06 | End: 2019-06-06

## 2019-06-06 RX ORDER — BUSPIRONE HYDROCHLORIDE 15 MG/1
TABLET ORAL
Qty: 180 TABLET | Refills: 1 | Status: SHIPPED | OUTPATIENT
Start: 2019-06-06 | End: 2019-11-26

## 2019-06-06 NOTE — TELEPHONE ENCOUNTER
[facility] Call Center    Phone Message    May a detailed message be left on voicemail: yes    Reason for Call: Medication Refill Request    Has the patient contacted the pharmacy for the refill? Yes   Name of medication being requested: busPIRone (BUSPAR) 15 MG tablet  Provider who prescribed the medication: Lizette Wolfe  Pharmacy:    [facility] - [id number]    Date medication is needed: Pt will be out by tomorrow.          Action Taken: Other: NP

## 2019-06-26 ENCOUNTER — MYC MEDICAL ADVICE (OUTPATIENT)
Dept: FAMILY MEDICINE | Facility: CLINIC | Age: 39
End: 2019-06-26

## 2019-06-26 DIAGNOSIS — F41.9 ANXIETY: ICD-10-CM

## 2019-06-26 RX ORDER — HYDROXYZINE HYDROCHLORIDE 50 MG/1
50-100 TABLET, FILM COATED ORAL
Qty: 90 TABLET | Refills: 1 | Status: SHIPPED | OUTPATIENT
Start: 2019-06-26 | End: 2019-09-30

## 2019-07-02 DIAGNOSIS — Z94.0 KIDNEY TRANSPLANTED: ICD-10-CM

## 2019-07-02 LAB
ANION GAP SERPL CALCULATED.3IONS-SCNC: 5 MMOL/L (ref 3–14)
BUN SERPL-MCNC: 21 MG/DL (ref 7–30)
CALCIUM SERPL-MCNC: 9 MG/DL (ref 8.5–10.1)
CHLORIDE SERPL-SCNC: 105 MMOL/L (ref 94–109)
CO2 SERPL-SCNC: 27 MMOL/L (ref 20–32)
CREAT SERPL-MCNC: 1.59 MG/DL (ref 0.66–1.25)
ERYTHROCYTE [DISTWIDTH] IN BLOOD BY AUTOMATED COUNT: 13.4 % (ref 10–15)
GFR SERPL CREATININE-BSD FRML MDRD: 54 ML/MIN/{1.73_M2}
GLUCOSE SERPL-MCNC: 103 MG/DL (ref 70–99)
HCT VFR BLD AUTO: 44.9 % (ref 40–53)
HGB BLD-MCNC: 15 G/DL (ref 13.3–17.7)
MCH RBC QN AUTO: 29.5 PG (ref 26.5–33)
MCHC RBC AUTO-ENTMCNC: 33.4 G/DL (ref 31.5–36.5)
MCV RBC AUTO: 88 FL (ref 78–100)
PLATELET # BLD AUTO: 138 10E9/L (ref 150–450)
POTASSIUM SERPL-SCNC: 4.3 MMOL/L (ref 3.4–5.3)
RBC # BLD AUTO: 5.09 10E12/L (ref 4.4–5.9)
SODIUM SERPL-SCNC: 137 MMOL/L (ref 133–144)
TACROLIMUS BLD-MCNC: 4.3 UG/L (ref 5–15)
TME LAST DOSE: ABNORMAL H
WBC # BLD AUTO: 5.5 10E9/L (ref 4–11)

## 2019-07-02 PROCEDURE — 80197 ASSAY OF TACROLIMUS: CPT | Performed by: INTERNAL MEDICINE

## 2019-07-02 PROCEDURE — 87799 DETECT AGENT NOS DNA QUANT: CPT | Performed by: INTERNAL MEDICINE

## 2019-07-02 PROCEDURE — 85027 COMPLETE CBC AUTOMATED: CPT | Performed by: INTERNAL MEDICINE

## 2019-07-02 PROCEDURE — 80048 BASIC METABOLIC PNL TOTAL CA: CPT | Performed by: INTERNAL MEDICINE

## 2019-07-02 PROCEDURE — 36415 COLL VENOUS BLD VENIPUNCTURE: CPT | Performed by: INTERNAL MEDICINE

## 2019-07-03 ENCOUNTER — TELEPHONE (OUTPATIENT)
Dept: TRANSPLANT | Facility: CLINIC | Age: 39
End: 2019-07-03

## 2019-07-03 NOTE — TELEPHONE ENCOUNTER
Call placed to patient. No answer. Voice message left with instructions listed below. Will try back

## 2019-07-03 NOTE — TELEPHONE ENCOUNTER
ISSUE:  Creatinine elevated 1.59    PLAN:  Call and assess hydration status.  How much water is he drinking per day?  Any recent illness, diarrhea, s/s of a UTI, or medication changes?  Any increased intake of alcohol or caffeine?  Recommend increasing hydration and repeating labs within 1 week.    LPN TASK:  Call with above instructions  Update lab orders

## 2019-07-05 NOTE — TELEPHONE ENCOUNTER
Sent [place] message to patient regarding:  Creatinine elevated 1.59     PLAN:  Call and assess hydration status.  How much water is he drinking per day?  Any recent illness, diarrhea, s/s of a UTI, or medication changes?  Any increased intake of alcohol or caffeine?  Recommend increasing hydration and repeating labs within 1 week

## 2019-07-05 NOTE — TELEPHONE ENCOUNTER
Patient replied via [facility] with the following:  Delta Wilson,   Sorry for not getting back to you right away. Was away for the 4th. I usually drink around 4 liters of water a day.  No illness to report. Had some diarrhea about a week before i had my labs done. No signs of a UTI. Medications have been the same and not changed. Only 1 cup of coffee per day.   I already have labs scheduled for July 16th, is that okay or should i go sooner?     Thanks, Cricket     Informed patient that it is OK to have labs drawn 7/16/19.

## 2019-07-08 ENCOUNTER — ANCILLARY PROCEDURE (OUTPATIENT)
Dept: CT IMAGING | Facility: CLINIC | Age: 39
End: 2019-07-08
Payer: COMMERCIAL

## 2019-07-08 DIAGNOSIS — D47.Z1 POST-TRANSPLANT LYMPHOPROLIFERATIVE DISORDER (H): ICD-10-CM

## 2019-07-08 LAB
CREAT BLD-MCNC: 1.5 MG/DL (ref 0.66–1.25)
GFR SERPL CREATININE-BSD FRML MDRD: 52 ML/MIN/{1.73_M2}

## 2019-07-08 RX ORDER — IOPAMIDOL 755 MG/ML
127 INJECTION, SOLUTION INTRAVASCULAR ONCE
Status: COMPLETED | OUTPATIENT
Start: 2019-07-08 | End: 2019-07-08

## 2019-07-08 RX ADMIN — IOPAMIDOL 127 ML: 755 INJECTION, SOLUTION INTRAVASCULAR at 13:02

## 2019-07-08 NOTE — DISCHARGE INSTRUCTIONS

## 2019-07-16 ENCOUNTER — APPOINTMENT (OUTPATIENT)
Dept: LAB | Facility: CLINIC | Age: 39
End: 2019-07-16
Attending: INTERNAL MEDICINE
Payer: COMMERCIAL

## 2019-07-16 ENCOUNTER — ONCOLOGY VISIT (OUTPATIENT)
Dept: ONCOLOGY | Facility: CLINIC | Age: 39
End: 2019-07-16
Attending: INTERNAL MEDICINE
Payer: COMMERCIAL

## 2019-07-16 VITALS
RESPIRATION RATE: 16 BRPM | HEIGHT: 71 IN | BODY MASS INDEX: 28.99 KG/M2 | TEMPERATURE: 98.6 F | WEIGHT: 207.1 LBS | HEART RATE: 66 BPM | SYSTOLIC BLOOD PRESSURE: 145 MMHG | DIASTOLIC BLOOD PRESSURE: 94 MMHG | OXYGEN SATURATION: 99 %

## 2019-07-16 DIAGNOSIS — D47.Z1 PTLD (POST-TRANSPLANT LYMPHOPROLIFERATIVE DISORDER) (H): Primary | ICD-10-CM

## 2019-07-16 LAB
ALBUMIN SERPL-MCNC: 4 G/DL (ref 3.4–5)
ALP SERPL-CCNC: 78 U/L (ref 40–150)
ALT SERPL W P-5'-P-CCNC: 21 U/L (ref 0–70)
ANION GAP SERPL CALCULATED.3IONS-SCNC: 6 MMOL/L (ref 3–14)
AST SERPL W P-5'-P-CCNC: 18 U/L (ref 0–45)
BASOPHILS # BLD AUTO: 0 10E9/L (ref 0–0.2)
BASOPHILS NFR BLD AUTO: 0.2 %
BILIRUB SERPL-MCNC: 0.5 MG/DL (ref 0.2–1.3)
BUN SERPL-MCNC: 13 MG/DL (ref 7–30)
CALCIUM SERPL-MCNC: 8.8 MG/DL (ref 8.5–10.1)
CHLORIDE SERPL-SCNC: 101 MMOL/L (ref 94–109)
CO2 SERPL-SCNC: 25 MMOL/L (ref 20–32)
CREAT SERPL-MCNC: 1.33 MG/DL (ref 0.66–1.25)
DIFFERENTIAL METHOD BLD: ABNORMAL
EOSINOPHIL # BLD AUTO: 0 10E9/L (ref 0–0.7)
EOSINOPHIL NFR BLD AUTO: 0.5 %
ERYTHROCYTE [DISTWIDTH] IN BLOOD BY AUTOMATED COUNT: 12.1 % (ref 10–15)
GFR SERPL CREATININE-BSD FRML MDRD: 67 ML/MIN/{1.73_M2}
GLUCOSE SERPL-MCNC: 83 MG/DL (ref 70–99)
HCT VFR BLD AUTO: 42.2 % (ref 40–53)
HGB BLD-MCNC: 14.5 G/DL (ref 13.3–17.7)
IMM GRANULOCYTES # BLD: 0 10E9/L (ref 0–0.4)
IMM GRANULOCYTES NFR BLD: 0.2 %
LDH SERPL L TO P-CCNC: 150 U/L (ref 85–227)
LYMPHOCYTES # BLD AUTO: 0.6 10E9/L (ref 0.8–5.3)
LYMPHOCYTES NFR BLD AUTO: 15.7 %
MCH RBC QN AUTO: 29.5 PG (ref 26.5–33)
MCHC RBC AUTO-ENTMCNC: 34.4 G/DL (ref 31.5–36.5)
MCV RBC AUTO: 86 FL (ref 78–100)
MONOCYTES # BLD AUTO: 0.4 10E9/L (ref 0–1.3)
MONOCYTES NFR BLD AUTO: 9 %
NEUTROPHILS # BLD AUTO: 3 10E9/L (ref 1.6–8.3)
NEUTROPHILS NFR BLD AUTO: 74.4 %
NRBC # BLD AUTO: 0 10*3/UL
NRBC BLD AUTO-RTO: 0 /100
PLATELET # BLD AUTO: 130 10E9/L (ref 150–450)
POTASSIUM SERPL-SCNC: 4.3 MMOL/L (ref 3.4–5.3)
PROT SERPL-MCNC: 6.9 G/DL (ref 6.8–8.8)
RBC # BLD AUTO: 4.92 10E12/L (ref 4.4–5.9)
SODIUM SERPL-SCNC: 132 MMOL/L (ref 133–144)
WBC # BLD AUTO: 4 10E9/L (ref 4–11)

## 2019-07-16 PROCEDURE — 80053 COMPREHEN METABOLIC PANEL: CPT | Performed by: INTERNAL MEDICINE

## 2019-07-16 PROCEDURE — 83615 LACTATE (LD) (LDH) ENZYME: CPT | Performed by: INTERNAL MEDICINE

## 2019-07-16 PROCEDURE — 36415 COLL VENOUS BLD VENIPUNCTURE: CPT

## 2019-07-16 PROCEDURE — 99213 OFFICE O/P EST LOW 20 MIN: CPT | Mod: GC | Performed by: INTERNAL MEDICINE

## 2019-07-16 PROCEDURE — 87799 DETECT AGENT NOS DNA QUANT: CPT | Performed by: INTERNAL MEDICINE

## 2019-07-16 PROCEDURE — 85025 COMPLETE CBC W/AUTO DIFF WBC: CPT | Performed by: INTERNAL MEDICINE

## 2019-07-16 PROCEDURE — G0463 HOSPITAL OUTPT CLINIC VISIT: HCPCS | Mod: ZF

## 2019-07-16 PROCEDURE — 82232 ASSAY OF BETA-2 PROTEIN: CPT | Performed by: INTERNAL MEDICINE

## 2019-07-16 RX ORDER — LISINOPRIL 10 MG/1
10 TABLET ORAL DAILY
COMMUNITY
Start: 2019-06-24 | End: 2020-02-11

## 2019-07-16 RX ORDER — AMLODIPINE BESYLATE 10 MG/1
TABLET ORAL
COMMUNITY
Start: 2019-02-25 | End: 2019-10-30

## 2019-07-16 ASSESSMENT — PAIN SCALES - GENERAL: PAINLEVEL: NO PAIN (0)

## 2019-07-16 ASSESSMENT — MIFFLIN-ST. JEOR: SCORE: 1881.27

## 2019-07-16 NOTE — PROGRESS NOTES
[facility] PHYSICIANS  HEMATOLOGY AND MEDICAL ONCOLOGY    FOLLOW UP APPOINTMENT    PATIENT NAME: Cricket Chen   MRN# [id number]     Date of Visit: Jul 16, 2019    Referring Provider: Girish Narayanan MD  [address]  [place] [address] YOB: 1980     Reason for visit: 37 yo man with  PTLD, treated with 4 weekly Rituxan and 4 cycles of R-CEOP presenting for a follow up.      CHIEF COMPLAINT   Oncology follow-up visit for PTLD     HISTORY OF PRESENTING ILLNESS     Mr. 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  CellCept was reduced from 1500 b.i.d. to 250 twice a day, and  tacrolimus 3 mg twice a day was decreased to 2 mg twice a day. He tolerated the RSST well and completed 4 weekly infusions of rituximab followed by R-COEP x4 (last cycle completed on 6/7/2017). He underwent end of Rx PET/CT scan and he had achieved CR1. Notably, his diagnostic PET/CT scan identified a thyroid nodule, which was consistent with papillary thyroid carcinoma based on the ultrasound-guided fine needle aspiration and he underwent thyroidectomy.  Repeat CT chest, abdomen and pelvis on 7/8/2019 did not show any evidence of active PTLD.    Interval events: 7/16/2019.  He presents the clinic for follow-up appointment.He is doing well.  He denies any symptoms. He denies any fever, chills or night sweats.  He denies any lumps or bumps. He was seen by the transplant team and his tacrolimus level was adjusted to 1.5 milligrams twice daily around March 2019.  He was seen by transplant team after and a level of tacrolimus was within normal range.  His CellCept was unchanged and is 250 twice a day.  He has upcoming appointment with transplant team at the end of the year.      PAST MEDICAL HISTORY     Past Medical  History:   Diagnosis Date     Alcohol abuse     Last drink in Mid-April 2014     Anemia in ESRD (end-stage renal disease) (H)      Anxiety 2008     Atrial flutter (H) 2017     Cirrhosis (H)     S/P liver transplant     Depression      History of blood transfusion      History of transposition of great vessels     atrial switch at age 8 months old     Hypertension      Liver transplant recipient (H)     2016     Papillary thyroid carcinoma (H)      Pneumonia 11-15-14     Renal transplant recipient     2016     Varices, esophageal (H)         PAST SURGICAL HISTORY     Past Surgical History:   Procedure Laterality Date     ANESTHESIA CARDIOVERSION N/A 3/7/2018    Procedure: ANESTHESIA CARDIOVERSION;  Cardioversion;  Surgeon: GENERIC ANESTHESIA PROVIDER;  Location:  OR     BENCH LIVER N/A 5/10/2016    Procedure: BENCH LIVER;  Surgeon: Ricky Deshpande MD;  Location:  OR     BIOPSY LYMPH NODE CERVICAL Right 1/26/2017    Procedure: BIOPSY LYMPH NODE CERVICAL;  Surgeon: Beka Soto MD;  Location:  OR     CARDIAC SURGERY  [birth year]10[birth year]     CREATE FISTULA ARTERIOVENOUS UPPER EXTREMITY Right 9/16/2014    Procedure: CREATE FISTULA ARTERIOVENOUS UPPER EXTREMITY;  Surgeon: Padmaja Eaton MD;  Location:  OR     CV RIGHT HEART CATH N/A 4/19/2019    Procedure: CV RIGHT HEART CATH;  Surgeon: Sabi Wiggins MD;  Location:  HEART CARDIAC CATH LAB     CYSTOSCOPY, REMOVE STENT(S), COMBINED Right 6/22/2016    Procedure: COMBINED CYSTOSCOPY, REMOVE STENT(S);  Surgeon: Ricky Deshpande MD;  Location:  OR     EMBOLECTOMY UPPER EXTREMITY Right 8/17/2018    Procedure: EMBOLECTOMY UPPER EXTREMITY;  Repair Right Upper Arm Pseudo Aneursym ;  Surgeon: John Payton MD;  Location:  OR     ENT SURGERY       ESOPHAGOSCOPY, GASTROSCOPY, DUODENOSCOPY (EGD), COMBINED  5/30/2014    Procedure: COMBINED ESOPHAGOSCOPY, GASTROSCOPY, DUODENOSCOPY (EGD);  Surgeon: Guillaume Bautista MD;  Location:  GI     ESOPHAGOSCOPY,  GASTROSCOPY, DUODENOSCOPY (EGD), COMBINED  14     ESOPHAGOSCOPY, GASTROSCOPY, DUODENOSCOPY (EGD), COMBINED Left 3/12/2015    Procedure: COMBINED ESOPHAGOSCOPY, GASTROSCOPY, DUODENOSCOPY (EGD);  Surgeon: Laureen Galvan MD;  Location: [facility] GI     ESOPHAGOSCOPY, GASTROSCOPY, DUODENOSCOPY (EGD), COMBINED N/A 2016    Procedure: COMBINED ESOPHAGOSCOPY, GASTROSCOPY, DUODENOSCOPY (EGD);  Surgeon: Laureen Galvan MD;  [facility]     ESOPHAGOSCOPY, GASTROSCOPY, DUODENOSCOPY (EGD), COMBINED N/A 2016    Procedure: COMBINED ESOPHAGOSCOPY, GASTROSCOPY, DUODENOSCOPY (EGD);  Surgeon: Laureen Galvan MD;  Location:  GI     HC OR CATH ABLATION NON-CARDIAC ENDOVASCULAR      SVT     INSERT PORT VASCULAR ACCESS N/A 4/3/2017    Procedure: INSERT PORT VASCULAR ACCESS;  Surgeon: Rajendra Jacques PA-C;  Location: [facility] OR     IR PORT REMOVAL LEFT  2019     LIGATE FISTULA ARTERIOVENOUS UPPER EXTREMITY Right 2017    Procedure: LIGATE FISTULA ARTERIOVENOUS UPPER EXTREMITY;  Revision of Right Arm Arteriovenous Fistula ;  Surgeon: Padmaja Eaton MD;  [facility]     PERCUTANEOUS BIOPSY KIDNEY Right 2018    Procedure: PERCUTANEOUS BIOPSY KIDNEY;  Right Kidney Biopsy;  Surgeon: Yuval Khan MD;  Location: [facility]     PICC INSERTION  14    PICC line placement 14; Removal 2014     REMOVE PORT VASCULAR ACCESS Right 2019    Procedure: Right chest Port Removal;  Surgeon: Erasmo Ziegler PA-C;  Location:  OR     THORACIC SURGERY  1980    Transposition great arteries, repaired at 8 months     THYROIDECTOMY Right 2017    Procedure: THYROIDECTOMY;  Bilateral Total Thyroidectomy ;  Surgeon: Beka Soto MD;  [facility]     TRANSPLANT KIDNEY RECIPIENT  DONOR  5/10/2016    Procedure: TRANSPLANT KIDNEY RECIPIENT  DONOR;  Surgeon: Ricky Deshpande MD;  [facility]     TRANSPLANT LIVER RECIPIENT   DONOR N/A 5/10/2016    Procedure: TRANSPLANT LIVER RECIPIENT  DONOR;  Surgeon: Ricky Deshpande MD;  [facility]         CURRENT OUTPATIENT MEDICATIONS     Current Outpatient Medications   Medication Sig     apixaban ANTICOAGULANT (ELIQUIS) 5 MG tablet Take 1 tablet (5 mg) by mouth 2 times daily     busPIRone (BUSPAR) 15 MG tablet Take one tablet (15 mg) twice daily for anxiety.     desonide (DESOWEN) 0.05 % ointment Apply topically 2 times daily     hydrOXYzine (ATARAX) 50 MG tablet Take 1-2 tablets ( mg) by mouth nightly as needed for anxiety     ketoconazole (NIZORAL) 2 % cream Twice daily to areas of rash on face     ketoconazole (NIZORAL) 2 % shampoo Use as a foaming face wash in shower daily     levothyroxine (SYNTHROID/LEVOTHROID) 137 MCG tablet Take 1 tablet by mouth Monday - Saturday  and 1.5 tablets on      lisinopril (PRINIVIL/ZESTRIL) 10 MG tablet      magnesium oxide (MAG-OX) 400 MG tablet TAKE ONE TABLET BY MOUTH TWICE A DAY     metoprolol succinate ER (TOPROL-XL) 25 MG 24 hr tablet Take 1 tablet (25 mg) by mouth daily     mycophenolate (GENERIC EQUIVALENT) 250 MG capsule Take 1 capsule (250 mg) by mouth 2 times daily     sulfamethoxazole-trimethoprim (BACTRIM/SEPTRA) 400-80 MG tablet Take 1 tablet by mouth daily     tacrolimus (GENERIC EQUIVALENT) 0.5 MG capsule Take 1 capsule (0.5 mg) by mouth every 12 hours *total dose 1.5 mg every 12 hours     tacrolimus (GENERIC EQUIVALENT) 1 MG capsule Take 1 capsule (1 mg) by mouth every 12 hours *total dose 1.5 mg every 12 hours     amLODIPine (NORVASC) 10 MG tablet      LORazepam (ATIVAN) 0.5 MG tablet Take 1 tablet (0.5 mg) by mouth 2 times daily as needed (for panic attacks) (Patient not taking: Reported on 2019)     No current facility-administered medications for this visit.         ALLERGIES     Allergies   Allergen Reactions     Hydromorphone Itching     .     SOCIAL HISTORY     Social History     Socioeconomic History      Marital status: Single     Spouse name: Not on file     Number of children: 0     Years of education: Not on file     Highest education level: Not on file   Occupational History     Employer: UNEMPLOYED   Social Needs     Financial resource strain: Not on file     Food insecurity:     Worry: Not on file     Inability: Not on file     Transportation needs:     Medical: Not on file     Non-medical: Not on file   Tobacco Use     Smoking status: Former Smoker     Packs/day: 0.33     Years: 3.00     Pack years: 0.99     Types: Cigarettes     Start date: 1997     Last attempt to quit: 2000     Years since quittin.8     Smokeless tobacco: Former User     Tobacco comment: Quit chewing in early    Substance and Sexual Activity     Alcohol use: No     Alcohol/week: 0.0 oz     Comment: ~10 drinks per day for ten years, quit in 2014     Drug use: No     Sexual activity: Not Currently     Partners: Female     Birth control/protection: None   Lifestyle     Physical activity:     Days per week: Not on file     Minutes per session: Not on file     Stress: Not on file   Relationships     Social connections:     Talks on phone: Not on file     Gets together: Not on file     Attends Yarsani service: Not on file     Active member of club or organization: Not on file     Attends meetings of clubs or organizations: Not on file     Relationship status: Not on file     Intimate partner violence:     Fear of current or ex partner: Not on file     Emotionally abused: Not on file     Physically abused: Not on file     Forced sexual activity: Not on file   Other Topics Concern     Parent/sibling w/ CABG, MI or angioplasty before 65F 55M? Not Asked   Social History Narrative    ? Blood transfusion as baby during surgery,    Professional performed tattoos     No Illicit IV or intranasal drug use.           FAMILY HISTORY     Family History   Problem Relation Age of Onset     No Known Problems Brother       "Arthritis Father      Hypertension Father      Hypertension Mother      Anxiety Disorder Mother      Hypertension Sister      No Known Problems Maternal Grandmother      No Known Problems Maternal Grandfather      No Known Problems Paternal Grandmother      No Known Problems Sister      No Known Problems Paternal Grandfather      Alcohol/Drug No family hx of      Gastrointestinal Disease No family hx of         no fam hx of liver disease or liver cancer          REVIEW OF SYSTEMS   Pertinent positives have been included in HPI;  Review Of Systems  General: per HPI  Skin: negative for rash  Eyes: negative for visual blurring  Ears/Nose/Throat: negative for hearing loss  Respiratory: No shortness of breath, dyspnea on exertion, cough, or hemoptysis  Cardiovascular: negative for palpitations and tachycardia  Gastrointestinal: negative for nausea, vomiting and abdominal pain  Genitourinary: negative for nocturia and dysuria  Musculoskeletal: negative for muscular pain or bone pain  Neurologic: negative for migraine headaches  Psychiatric: negative for anxiety  Hematologic/Lymphatic/Immunologic: as per hpi  Endocrine: as per hpi       PHYSICAL EXAM   BP (!) 145/94 (BP Location: Left arm, Patient Position: Sitting, Cuff Size: Adult Regular)   Pulse 66   Temp 98.6  F (37  C) (Oral)   Resp 16   Ht 1.803 m (5' 10.98\")   Wt 93.9 kg (207 lb 1.6 oz)   SpO2 99%   BMI 28.90 kg/m     General appearance: pleasant man not in acute distress  Eyes, Ears, Nose, Throat & Mouth:pupils equal and reactive to light and accommodation, extraocular movements intact, no icterus, injection or pallor. Oropharynx is clear.  Neck: supple, see hem  Respiratory: clear to auscultation bilaterally  Cardiovascular: regular, no murmurs, rubs, or gallops  Thorax: PORT has been removed.  Gastrointenstinal: soft, non-tender, non-distended, normal bowel sounds, no hepatosplenomegaly  Extremities: warm, well perfused, no edema  Skin: no " rash  Hematologic/Lymph: no cervical or inguinal lymphadenopathy, 1x1cm mobile right axillary lymph node     LABORATORY AND IMAGING STUDIES     Recent Labs   Lab Test 07/16/19  1241 07/02/19  1117 06/04/19  1112  04/19/19  0717  01/18/19  1140   WBC 4.0 5.5 4.9   < > 5.1   < > 4.9   RBC 4.92 5.09 4.79   < > 5.30   < > 5.22   HGB 14.5 15.0 14.3   < > 15.7   < > 15.7   HCT 42.2 44.9 41.7   < > 48.1   < > 45.1   MCV 86 88 87   < > 91   < > 86   MCH 29.5 29.5 29.9   < > 29.6   < > 30.1   MCHC 34.4 33.4 34.3   < > 32.6   < > 34.8   RDW 12.1 13.4 12.9   < > 12.5   < > 12.0   * 138* 145*   < > 144*   < > 144*   NEUTROPHIL 74.4  --   --   --  65.2  --  76.3   ANEU 3.0  --   --   --  3.3  --  3.8   ALYM 0.6*  --   --   --  1.1  --  0.7*   JANE 0.4  --   --   --  0.6  --  0.4   AEOS 0.0  --   --   --  0.1  --  0.0    < > = values in this interval not displayed.     Recent Labs   Lab Test 07/16/19  1241 07/02/19  1117 06/04/19  1112   * 137 135   POTASSIUM 4.3 4.3 4.1   CHLORIDE 101 105 104   CO2 25 27 22   ANIONGAP 6 5 9   GLC 83 103* 101*   BUN 13 21 29   CR 1.33* 1.59* 1.52*   COLBY 8.8 9.0 8.9     Recent Labs   Lab Test 07/16/19  1241 03/19/19  1124 02/25/19  1306   BILITOTAL 0.5 0.7 0.4   ALKPHOS 78 98 104   AST 18 21 21   ALT 21 26 53     Recent Labs   Lab Test 07/16/19  1241 01/18/19  1140 01/15/19  1123    157 151     Recent Labs   Lab Test 01/08/19  1125 09/18/18  1114 01/02/18  1225 02/01/17  1104    [id number]   IGM  --   --   --  82   IGA  --   --   --  150     Recent Labs   Lab Test 02/01/17  1104   ELPM 0.0   ELPINT Essentially normal electrophoretic pattern.  No monoclonal protein seen.   Pathologic significance requires clinical correlation.  PATRICIA Moody M.D.,   Ph.D., Pathologist ().       No lab results found.  No lab results found.    Invalid input(s): 4      ECOG PS: 0   ASSESSMENT AND RECOMMENDATIONS     Mr. 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  He is here for his follow-up appointment.  EBV viral load and B2M are pending.  Imaging review consistent with CR on 7/2019. He is on immunosuppression with tacrolimus and mycophenolate. He is following with transplant team. He will come back in clinic in 6 months with labs for surveillance appointment.  We will notify him about the pending labs (B2M and EBV viral load are pending).    Return to clinic in 6 months with pre-clinic CBC, CMP, LDH, B2M, EBV viral load     Patient was seen and discussed with Dr. Prather.    Danette Padilla MD  Hem/onc fellow    Physician Attestation     Patient was seen and examined by me with the hematology & oncology fellow  . I have reviewed today's vital signs, medications, labs and imaging results. Agree with the HPI, physical exam, assessment and plan as documented in the note.    Carter Prather MD   of Medicine  Division of Hematology, Oncology and Transplantation  [facility]

## 2019-07-16 NOTE — LETTER
7/16/2019       RE: Cricket Chen  [address]  [facility] [address]     Dear Colleague,    Thank you for referring your patient, Cricket Chen, to the [facility]. Please see a copy of my visit note below.      [facility]  HEMATOLOGY AND MEDICAL ONCOLOGY    FOLLOW UP APPOINTMENT    PATIENT NAME: Cricket Chen   MRN# [id number]     Date of Visit: Jul 16, 2019    Referring Provider: Girish Narayanan MD  [address]  [place] [address] YOB: 1980     Reason for visit: 39 yo man with  PTLD, treated with 4 weekly Rituxan and 4 cycles of R-CEOP presenting for a follow up.      CHIEF COMPLAINT   Oncology follow-up visit for PTLD     HISTORY OF PRESENTING ILLNESS     Mr. 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  CellCept was reduced from 1500 b.i.d. to 250 twice a day, and  tacrolimus 3 mg twice a day was decreased to 2 mg twice a day. He tolerated the RSST well and completed 4 weekly infusions of rituximab followed by R-COEP x4 (last cycle completed on 6/7/2017). He underwent end of Rx PET/CT scan and he had achieved CR1. Notably, his diagnostic PET/CT scan identified a thyroid nodule, which was consistent with papillary thyroid carcinoma based on the ultrasound-guided fine needle aspiration and he underwent thyroidectomy.  Repeat CT chest, abdomen and pelvis on 7/8/2019 did not show any evidence of active PTLD.    Interval events: 7/16/2019.  He presents the clinic for follow-up appointment.He is doing well.  He denies any symptoms. He denies any fever, chills or night sweats.  He denies any lumps or bumps. He was seen by the transplant team and his tacrolimus level was adjusted to 1.5 milligrams twice daily around March 2019.  He was seen by  transplant team after and a level of tacrolimus was within normal range.  His CellCept was unchanged and is 250 twice a day.  He has upcoming appointment with transplant team at the end of the year.      PAST MEDICAL HISTORY     Past Medical History:   Diagnosis Date     Alcohol abuse     Last drink in Mid-April 2014     Anemia in ESRD (end-stage renal disease) (H)      Anxiety 2008     Atrial flutter (H) 2017     Cirrhosis (H)     S/P liver transplant     Depression      History of blood transfusion      History of transposition of great vessels     atrial switch at age 8 months old     Hypertension      Liver transplant recipient (H)     2016     Papillary thyroid carcinoma (H)      Pneumonia 11-15-14     Renal transplant recipient     2016     Varices, esophageal (H)         PAST SURGICAL HISTORY     Past Surgical History:   Procedure Laterality Date     ANESTHESIA CARDIOVERSION N/A 3/7/2018    Procedure: ANESTHESIA CARDIOVERSION;  Cardioversion;  Surgeon: GENERIC ANESTHESIA PROVIDER;  Location:  OR     BENCH LIVER N/A 5/10/2016    Procedure: BENCH LIVER;  Surgeon: Ricky Deshpande MD;  Location: [facility] OR     BIOPSY LYMPH NODE CERVICAL Right 1/26/2017    Procedure: BIOPSY LYMPH NODE CERVICAL;  Surgeon: Beka Soto MD;  Location:  OR     CARDIAC SURGERY  9-10[birth year]80     CREATE FISTULA ARTERIOVENOUS UPPER EXTREMITY Right 9/16/2014    Procedure: CREATE FISTULA ARTERIOVENOUS UPPER EXTREMITY;  Surgeon: Padmaja Eaton MD;  Location:  OR     CV RIGHT HEART CATH N/A 4/19/2019    Procedure: CV RIGHT HEART CATH;  Surgeon: Sabi Wiggins MD;  Location:  HEART CARDIAC CATH LAB     CYSTOSCOPY, REMOVE STENT(S), COMBINED Right 6/22/2016    Procedure: COMBINED CYSTOSCOPY, REMOVE STENT(S);  Surgeon: Ricky Deshpande MD;  Location:  OR     EMBOLECTOMY UPPER EXTREMITY Right 8/17/2018    Procedure: EMBOLECTOMY UPPER EXTREMITY;  Repair Right Upper Arm Pseudo Aneursym ;  Surgeon: John Payton MD;   [facility] OR     ENT SURGERY       ESOPHAGOSCOPY, GASTROSCOPY, DUODENOSCOPY (EGD), COMBINED  5/30/2014    Procedure: COMBINED ESOPHAGOSCOPY, GASTROSCOPY, DUODENOSCOPY (EGD);  Surgeon: Guillaume Bautista MD;  Location: [facility] GI     ESOPHAGOSCOPY, GASTROSCOPY, DUODENOSCOPY (EGD), COMBINED  9/30/14     ESOPHAGOSCOPY, GASTROSCOPY, DUODENOSCOPY (EGD), COMBINED Left 3/12/2015    Procedure: COMBINED ESOPHAGOSCOPY, GASTROSCOPY, DUODENOSCOPY (EGD);  Surgeon: Laureen Galvan MD;  [facility]     ESOPHAGOSCOPY, GASTROSCOPY, DUODENOSCOPY (EGD), COMBINED N/A 4/21/2016    Procedure: COMBINED ESOPHAGOSCOPY, GASTROSCOPY, DUODENOSCOPY (EGD);  Surgeon: Laureen Galvan MD;  Location:  GI     ESOPHAGOSCOPY, GASTROSCOPY, DUODENOSCOPY (EGD), COMBINED N/A 7/21/2016    Procedure: COMBINED ESOPHAGOSCOPY, GASTROSCOPY, DUODENOSCOPY (EGD);  Surgeon: Laureen Galvan MD;  Location: [facility] GI     HC OR CATH ABLATION NON-CARDIAC ENDOVASCULAR  1990,2002    SVT     INSERT PORT VASCULAR ACCESS N/A 4/3/2017    Procedure: INSERT PORT VASCULAR ACCESS;  Surgeon: Rajendra Jacques PA-C;  [facility]     IR PORT REMOVAL LEFT  1/22/2019     LIGATE FISTULA ARTERIOVENOUS UPPER EXTREMITY Right 11/7/2017    Procedure: LIGATE FISTULA ARTERIOVENOUS UPPER EXTREMITY;  Revision of Right Arm Arteriovenous Fistula ;  Surgeon: Padmaja Eaton MD;  [facility]     PERCUTANEOUS BIOPSY KIDNEY Right 9/26/2018    Procedure: PERCUTANEOUS BIOPSY KIDNEY;  Right Kidney Biopsy;  Surgeon: Yuval Khan MD;  [facility]     PICC INSERTION  5/30/14    PICC line placement 5/30/14; Removal 6/9/2014     REMOVE PORT VASCULAR ACCESS Right 1/22/2019    Procedure: Right chest Port Removal;  Surgeon: Erasmo Ziegler PA-C;  Location:  OR     THORACIC SURGERY  1980    Transposition great arteries, repaired at 8 months     THYROIDECTOMY Right 8/7/2017    Procedure: THYROIDECTOMY;  Bilateral Total Thyroidectomy ;   Surgeon: Bkea Soto MD;  [facility]     TRANSPLANT KIDNEY RECIPIENT  DONOR  5/10/2016    Procedure: TRANSPLANT KIDNEY RECIPIENT  DONOR;  Surgeon: Ricky Deshpande MD;  [facility]     TRANSPLANT LIVER RECIPIENT  DONOR N/A 5/10/2016    Procedure: TRANSPLANT LIVER RECIPIENT  DONOR;  Surgeon: Ricky Deshpande MD;  [facility]         CURRENT OUTPATIENT MEDICATIONS     Current Outpatient Medications   Medication Sig     apixaban ANTICOAGULANT (ELIQUIS) 5 MG tablet Take 1 tablet (5 mg) by mouth 2 times daily     busPIRone (BUSPAR) 15 MG tablet Take one tablet (15 mg) twice daily for anxiety.     desonide (DESOWEN) 0.05 % ointment Apply topically 2 times daily     hydrOXYzine (ATARAX) 50 MG tablet Take 1-2 tablets ( mg) by mouth nightly as needed for anxiety     ketoconazole (NIZORAL) 2 % cream Twice daily to areas of rash on face     ketoconazole (NIZORAL) 2 % shampoo Use as a foaming face wash in shower daily     levothyroxine (SYNTHROID/LEVOTHROID) 137 MCG tablet Take 1 tablet by mouth Monday - Saturday  and 1.5 tablets on      lisinopril (PRINIVIL/ZESTRIL) 10 MG tablet      magnesium oxide (MAG-OX) 400 MG tablet TAKE ONE TABLET BY MOUTH TWICE A DAY     metoprolol succinate ER (TOPROL-XL) 25 MG 24 hr tablet Take 1 tablet (25 mg) by mouth daily     mycophenolate (GENERIC EQUIVALENT) 250 MG capsule Take 1 capsule (250 mg) by mouth 2 times daily     sulfamethoxazole-trimethoprim (BACTRIM/SEPTRA) 400-80 MG tablet Take 1 tablet by mouth daily     tacrolimus (GENERIC EQUIVALENT) 0.5 MG capsule Take 1 capsule (0.5 mg) by mouth every 12 hours *total dose 1.5 mg every 12 hours     tacrolimus (GENERIC EQUIVALENT) 1 MG capsule Take 1 capsule (1 mg) by mouth every 12 hours *total dose 1.5 mg every 12 hours     amLODIPine (NORVASC) 10 MG tablet      LORazepam (ATIVAN) 0.5 MG tablet Take 1 tablet (0.5 mg) by mouth 2 times daily as needed (for panic attacks)  (Patient not taking: Reported on 2019)     No current facility-administered medications for this visit.         ALLERGIES     Allergies   Allergen Reactions     Hydromorphone Itching     .     SOCIAL HISTORY     Social History     Socioeconomic History     Marital status: Single     Spouse name: Not on file     Number of children: 0     Years of education: Not on file     Highest education level: Not on file   Occupational History     Employer: UNEMPLOYED   Social Needs     Financial resource strain: Not on file     Food insecurity:     Worry: Not on file     Inability: Not on file     Transportation needs:     Medical: Not on file     Non-medical: Not on file   Tobacco Use     Smoking status: Former Smoker     Packs/day: 0.33     Years: 3.00     Pack years: 0.99     Types: Cigarettes     Start date: 1997     Last attempt to quit: 2000     Years since quittin.8     Smokeless tobacco: Former User     Tobacco comment: Quit chewing in early    Substance and Sexual Activity     Alcohol use: No     Alcohol/week: 0.0 oz     Comment: ~10 drinks per day for ten years, quit in 2014     Drug use: No     Sexual activity: Not Currently     Partners: Female     Birth control/protection: None   Lifestyle     Physical activity:     Days per week: Not on file     Minutes per session: Not on file     Stress: Not on file   Relationships     Social connections:     Talks on phone: Not on file     Gets together: Not on file     Attends Taoist service: Not on file     Active member of club or organization: Not on file     Attends meetings of clubs or organizations: Not on file     Relationship status: Not on file     Intimate partner violence:     Fear of current or ex partner: Not on file     Emotionally abused: Not on file     Physically abused: Not on file     Forced sexual activity: Not on file   Other Topics Concern     Parent/sibling w/ CABG, MI or angioplasty before 65F 55M? Not Asked   Social  "History Narrative    ? Blood transfusion as baby during surgery,    Professional performed tattoos     No Illicit IV or intranasal drug use.           FAMILY HISTORY     Family History   Problem Relation Age of Onset     No Known Problems Brother      Arthritis Father      Hypertension Father      Hypertension Mother      Anxiety Disorder Mother      Hypertension Sister      No Known Problems Maternal Grandmother      No Known Problems Maternal Grandfather      No Known Problems Paternal Grandmother      No Known Problems Sister      No Known Problems Paternal Grandfather      Alcohol/Drug No family hx of      Gastrointestinal Disease No family hx of         no fam hx of liver disease or liver cancer          REVIEW OF SYSTEMS   Pertinent positives have been included in HPI;  Review Of Systems  General: per HPI  Skin: negative for rash  Eyes: negative for visual blurring  Ears/Nose/Throat: negative for hearing loss  Respiratory: No shortness of breath, dyspnea on exertion, cough, or hemoptysis  Cardiovascular: negative for palpitations and tachycardia  Gastrointestinal: negative for nausea, vomiting and abdominal pain  Genitourinary: negative for nocturia and dysuria  Musculoskeletal: negative for muscular pain or bone pain  Neurologic: negative for migraine headaches  Psychiatric: negative for anxiety  Hematologic/Lymphatic/Immunologic: as per hpi  Endocrine: as per hpi       PHYSICAL EXAM   BP (!) 145/94 (BP Location: Left arm, Patient Position: Sitting, Cuff Size: Adult Regular)   Pulse 66   Temp 98.6  F (37  C) (Oral)   Resp 16   Ht 1.803 m (5' 10.98\")   Wt 93.9 kg (207 lb 1.6 oz)   SpO2 99%   BMI 28.90 kg/m     General appearance: pleasant man not in acute distress  Eyes, Ears, Nose, Throat & Mouth:pupils equal and reactive to light and accommodation, extraocular movements intact, no icterus, injection or pallor. Oropharynx is clear.  Neck: supple, see hem  Respiratory: clear to auscultation " bilaterally  Cardiovascular: regular, no murmurs, rubs, or gallops  Thorax: PORT has been removed.  Gastrointenstinal: soft, non-tender, non-distended, normal bowel sounds, no hepatosplenomegaly  Extremities: warm, well perfused, no edema  Skin: no rash  Hematologic/Lymph: no cervical or inguinal lymphadenopathy, 1x1cm mobile right axillary lymph node     LABORATORY AND IMAGING STUDIES     Recent Labs   Lab Test 07/16/19  1241 07/02/19  1117 06/04/19  1112  04/19/19  0717  01/18/19  1140   WBC 4.0 5.5 4.9   < > 5.1   < > 4.9   RBC 4.92 5.09 4.79   < > 5.30   < > 5.22   HGB 14.5 15.0 14.3   < > 15.7   < > 15.7   HCT 42.2 44.9 41.7   < > 48.1   < > 45.1   MCV 86 88 87   < > 91   < > 86   MCH 29.5 29.5 29.9   < > 29.6   < > 30.1   MCHC 34.4 33.4 34.3   < > 32.6   < > 34.8   RDW 12.1 13.4 12.9   < > 12.5   < > 12.0   * 138* 145*   < > 144*   < > 144*   NEUTROPHIL 74.4  --   --   --  65.2  --  76.3   ANEU 3.0  --   --   --  3.3  --  3.8   ALYM 0.6*  --   --   --  1.1  --  0.7*   JANE 0.4  --   --   --  0.6  --  0.4   AEOS 0.0  --   --   --  0.1  --  0.0    < > = values in this interval not displayed.     Recent Labs   Lab Test 07/16/19  1241 07/02/19  1117 06/04/19  1112   * 137 135   POTASSIUM 4.3 4.3 4.1   CHLORIDE 101 105 104   CO2 25 27 22   ANIONGAP 6 5 9   GLC 83 103* 101*   BUN 13 21 29   CR 1.33* 1.59* 1.52*   COLBY 8.8 9.0 8.9     Recent Labs   Lab Test 07/16/19  1241 03/19/19  1124 02/25/19  1306   BILITOTAL 0.5 0.7 0.4   ALKPHOS 78 98 104   AST 18 21 21   ALT 21 26 53     Recent Labs   Lab Test 07/16/19  1241 01/18/19  1140 01/15/19  1123    157 151     Recent Labs   Lab Test 01/08/19  1125 09/18/18  1114 01/02/18  1225 02/01/17  1104    [phone]   IGM  --   --   --  82   IGA  --   --   --  150     Recent Labs   Lab Test 02/01/17  1104   ELPM 0.0   ELPINT Essentially normal electrophoretic pattern.  No monoclonal protein seen.   Pathologic significance requires clinical correlation.   PATRICIA Moody M.D.,   Ph.D., Pathologist ().       No lab results found.  No lab results found.    Invalid input(s): 4      ECOG PS: 0   ASSESSMENT AND RECOMMENDATIONS     Mr. 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  He is here for his follow-up appointment.  EBV viral load and B2M are pending.  Imaging review consistent with CR on 7/2019. He is on immunosuppression with tacrolimus and mycophenolate. He is following with transplant team. He will come back in clinic in 6 months with labs for surveillance appointment.  We will notify him about the pending labs (B2M and EBV viral load are pending).    Return to clinic in 6 months with pre-clinic CBC, CMP, LDH, B2M, EBV viral load     Patient was seen and discussed with Dr. Prather.    Danette Padilla MD  Hem/onc fellow    Physician Attestation     Patient was seen and examined by me with the hematology & oncology fellow  . I have reviewed today's vital signs, medications, labs and imaging results. Agree with the HPI, physical exam, assessment and plan as documented in the note.    Carter Prather MD   of Medicine  Division of Hematology, Oncology and Transplantation  [facility]

## 2019-07-16 NOTE — NURSING NOTE
"Oncology Rooming Note    July 16, 2019 12:49 PM   Cricket Chen is a 38 year old male who presents for:    Chief Complaint   Patient presents with     Oncology Clinic Visit     Return visit related to Post-transplant lymphoproliferative disorder      Blood Draw     labs drawn via vpt by rn. vs taken     Initial Vitals: BP (!) 145/94 (BP Location: Left arm, Patient Position: Sitting, Cuff Size: Adult Regular)   Pulse 66   Temp 98.6  F (37  C) (Oral)   Resp 16   Ht 1.803 m (5' 10.98\")   Wt 93.9 kg (207 lb 1.6 oz)   SpO2 99%   BMI 28.90 kg/m   Estimated body mass index is 28.9 kg/m  as calculated from the following:    Height as of this encounter: 1.803 m (5' 10.98\").    Weight as of this encounter: 93.9 kg (207 lb 1.6 oz). Body surface area is 2.17 meters squared.  No Pain (0) Comment: Data Unavailable   No LMP for male patient.  Allergies reviewed: Yes  Medications reviewed: Yes    Medications: Medication refills not needed today.  Pharmacy name entered into [employer]:    [facility] - [place], [place] - [address] [facility] - [place] - [address] [facility] - [place], [place] - [address]    Clinical concerns: No new concerns. Provider was notified.      Michelle Lester LPN            "

## 2019-07-16 NOTE — NURSING NOTE
Chief Complaint   Patient presents with     Oncology Clinic Visit     Return visit related to Post-transplant lymphoproliferative disorder      Blood Draw     labs drawn via vpt by rn. vs taken     Blood drawn via vpt by RN in lab. VS taken. Pt checked into next appointment.   Indira Walter RN

## 2019-07-17 LAB — B2 MICROGLOB SERPL-MCNC: 2.3 MG/L

## 2019-07-18 LAB
EBV DNA # SPEC NAA+PROBE: NORMAL {COPIES}/ML
EBV DNA SPEC NAA+PROBE-LOG#: NORMAL {LOG_COPIES}/ML

## 2019-08-06 DIAGNOSIS — Z94.0 KIDNEY TRANSPLANTED: ICD-10-CM

## 2019-08-06 DIAGNOSIS — Z94.4 LIVER REPLACED BY TRANSPLANT (H): Primary | ICD-10-CM

## 2019-08-06 LAB
ANION GAP SERPL CALCULATED.3IONS-SCNC: 7 MMOL/L (ref 3–14)
BUN SERPL-MCNC: 17 MG/DL (ref 7–30)
CALCIUM SERPL-MCNC: 9.2 MG/DL (ref 8.5–10.1)
CHLORIDE SERPL-SCNC: 104 MMOL/L (ref 94–109)
CO2 SERPL-SCNC: 25 MMOL/L (ref 20–32)
CREAT SERPL-MCNC: 1.5 MG/DL (ref 0.66–1.25)
ERYTHROCYTE [DISTWIDTH] IN BLOOD BY AUTOMATED COUNT: 13.5 % (ref 10–15)
GFR SERPL CREATININE-BSD FRML MDRD: 58 ML/MIN/{1.73_M2}
GLUCOSE SERPL-MCNC: 105 MG/DL (ref 70–99)
HCT VFR BLD AUTO: 43 % (ref 40–53)
HGB BLD-MCNC: 14.7 G/DL (ref 13.3–17.7)
MCH RBC QN AUTO: 29.8 PG (ref 26.5–33)
MCHC RBC AUTO-ENTMCNC: 34.2 G/DL (ref 31.5–36.5)
MCV RBC AUTO: 87 FL (ref 78–100)
PLATELET # BLD AUTO: 129 10E9/L (ref 150–450)
POTASSIUM SERPL-SCNC: 4.5 MMOL/L (ref 3.4–5.3)
RBC # BLD AUTO: 4.93 10E12/L (ref 4.4–5.9)
SODIUM SERPL-SCNC: 136 MMOL/L (ref 133–144)
WBC # BLD AUTO: 4.9 10E9/L (ref 4–11)

## 2019-08-06 PROCEDURE — 85027 COMPLETE CBC AUTOMATED: CPT | Performed by: INTERNAL MEDICINE

## 2019-08-06 PROCEDURE — 87799 DETECT AGENT NOS DNA QUANT: CPT | Performed by: INTERNAL MEDICINE

## 2019-08-06 PROCEDURE — 36415 COLL VENOUS BLD VENIPUNCTURE: CPT | Performed by: INTERNAL MEDICINE

## 2019-08-06 PROCEDURE — 80197 ASSAY OF TACROLIMUS: CPT | Performed by: INTERNAL MEDICINE

## 2019-08-06 PROCEDURE — 80048 BASIC METABOLIC PNL TOTAL CA: CPT | Performed by: INTERNAL MEDICINE

## 2019-08-07 LAB
BKV DNA # SPEC NAA+PROBE: <500 COPIES/ML
BKV DNA SPEC NAA+PROBE-LOG#: <2.7 LOG COPIES/ML
SPECIMEN SOURCE: ABNORMAL
TACROLIMUS BLD-MCNC: 4.7 UG/L (ref 5–15)
TME LAST DOSE: ABNORMAL H

## 2019-08-20 ENCOUNTER — DOCUMENTATION ONLY (OUTPATIENT)
Dept: CARE COORDINATION | Facility: CLINIC | Age: 39
End: 2019-08-20

## 2019-08-21 ENCOUNTER — CARE COORDINATION (OUTPATIENT)
Dept: CARDIOLOGY | Facility: CLINIC | Age: 39
End: 2019-08-21

## 2019-08-21 DIAGNOSIS — Q20.3 D-TGA (DEXTRO-TRANSPOSITION OF GREAT ARTERIES): Primary | ICD-10-CM

## 2019-08-23 ENCOUNTER — CARE COORDINATION (OUTPATIENT)
Dept: CARDIOLOGY | Facility: CLINIC | Age: 39
End: 2019-08-23

## 2019-09-03 DIAGNOSIS — Z94.4 LIVER REPLACED BY TRANSPLANT (H): ICD-10-CM

## 2019-09-03 DIAGNOSIS — Z94.0 KIDNEY TRANSPLANTED: ICD-10-CM

## 2019-09-03 DIAGNOSIS — Q20.3 D-TGA (DEXTRO-TRANSPOSITION OF GREAT ARTERIES): ICD-10-CM

## 2019-09-03 LAB
ALBUMIN SERPL-MCNC: 4.3 G/DL (ref 3.4–5)
ALP SERPL-CCNC: 70 U/L (ref 40–150)
ALT SERPL W P-5'-P-CCNC: 18 U/L (ref 0–70)
ANION GAP SERPL CALCULATED.3IONS-SCNC: 6 MMOL/L (ref 3–14)
AST SERPL W P-5'-P-CCNC: 18 U/L (ref 0–45)
BILIRUB DIRECT SERPL-MCNC: 0.2 MG/DL (ref 0–0.2)
BILIRUB SERPL-MCNC: 0.8 MG/DL (ref 0.2–1.3)
BUN SERPL-MCNC: 17 MG/DL (ref 7–30)
CALCIUM SERPL-MCNC: 9 MG/DL (ref 8.5–10.1)
CHLORIDE SERPL-SCNC: 106 MMOL/L (ref 94–109)
CO2 SERPL-SCNC: 25 MMOL/L (ref 20–32)
CREAT SERPL-MCNC: 1.66 MG/DL (ref 0.66–1.25)
ERYTHROCYTE [DISTWIDTH] IN BLOOD BY AUTOMATED COUNT: 13 % (ref 10–15)
GFR SERPL CREATININE-BSD FRML MDRD: 51 ML/MIN/{1.73_M2}
GLUCOSE SERPL-MCNC: 88 MG/DL (ref 70–99)
HCT VFR BLD AUTO: 45.8 % (ref 40–53)
HGB BLD-MCNC: 15.7 G/DL (ref 13.3–17.7)
MCH RBC QN AUTO: 29.6 PG (ref 26.5–33)
MCHC RBC AUTO-ENTMCNC: 34.3 G/DL (ref 31.5–36.5)
MCV RBC AUTO: 86 FL (ref 78–100)
PLATELET # BLD AUTO: 140 10E9/L (ref 150–450)
POTASSIUM SERPL-SCNC: 4.4 MMOL/L (ref 3.4–5.3)
PROT SERPL-MCNC: 7.2 G/DL (ref 6.8–8.8)
RBC # BLD AUTO: 5.3 10E12/L (ref 4.4–5.9)
SODIUM SERPL-SCNC: 137 MMOL/L (ref 133–144)
TACROLIMUS BLD-MCNC: 4.7 UG/L (ref 5–15)
TME LAST DOSE: ABNORMAL H
WBC # BLD AUTO: 5 10E9/L (ref 4–11)

## 2019-09-03 PROCEDURE — 80048 BASIC METABOLIC PNL TOTAL CA: CPT | Performed by: INTERNAL MEDICINE

## 2019-09-03 PROCEDURE — 80076 HEPATIC FUNCTION PANEL: CPT | Performed by: INTERNAL MEDICINE

## 2019-09-03 PROCEDURE — 36415 COLL VENOUS BLD VENIPUNCTURE: CPT | Performed by: INTERNAL MEDICINE

## 2019-09-03 PROCEDURE — 80197 ASSAY OF TACROLIMUS: CPT | Performed by: INTERNAL MEDICINE

## 2019-09-03 PROCEDURE — 85027 COMPLETE CBC AUTOMATED: CPT | Performed by: INTERNAL MEDICINE

## 2019-09-03 PROCEDURE — 87799 DETECT AGENT NOS DNA QUANT: CPT | Performed by: INTERNAL MEDICINE

## 2019-09-04 ENCOUNTER — TELEPHONE (OUTPATIENT)
Dept: TRANSPLANT | Facility: CLINIC | Age: 39
End: 2019-09-04

## 2019-09-04 NOTE — TELEPHONE ENCOUNTER
ISSUE:  Creatinine elevated 1.66    PLAN:  Call and assess hydration status.  How much water is he drinking per day?  Any recent illness, diarrhea, s/s of a UTI, or medication changes?  Any increased intake of alcohol or caffeine?  Recommend increasing hydration and repeating labs within 1 week.    OUTCOME:  Flip[employer] message sent to Cricket with above questions/reccomendations.  See response below.    Hi Janna Umanzor been feeling well lately. No signs of illness. I have had some diarrhea the past few days. No symptoms of a UTI and no med changes. I feel like I'm drinking the same amount of water every day. Caffeine is normally 1-2 cups of coffee a day.   When I had my labs done yesterday, I was late on waking up so I did not drink much water before hand. I will make a new appointment for next week and wait to hear from you.     Thanks,   Isidro

## 2019-09-06 ENCOUNTER — OFFICE VISIT (OUTPATIENT)
Dept: CARDIOLOGY | Facility: CLINIC | Age: 39
End: 2019-09-06
Attending: INTERNAL MEDICINE
Payer: COMMERCIAL

## 2019-09-06 VITALS
HEIGHT: 70 IN | OXYGEN SATURATION: 98 % | BODY MASS INDEX: 29.72 KG/M2 | HEART RATE: 64 BPM | DIASTOLIC BLOOD PRESSURE: 80 MMHG | SYSTOLIC BLOOD PRESSURE: 122 MMHG

## 2019-09-06 DIAGNOSIS — R93.1 ABNORMAL FINDINGS ON DIAGNOSTIC IMAGING OF HEART AND CORONARY CIRCULATION: ICD-10-CM

## 2019-09-06 DIAGNOSIS — Q20.3 COMPLETE TRANSPOSITION OF GREAT VESSELS: ICD-10-CM

## 2019-09-06 PROCEDURE — G0463 HOSPITAL OUTPT CLINIC VISIT: HCPCS | Mod: 25,ZF

## 2019-09-06 PROCEDURE — 99214 OFFICE O/P EST MOD 30 MIN: CPT | Mod: ZP | Performed by: INTERNAL MEDICINE

## 2019-09-06 ASSESSMENT — PAIN SCALES - GENERAL: PAINLEVEL: NO PAIN (0)

## 2019-09-06 ASSESSMENT — PATIENT HEALTH QUESTIONNAIRE - PHQ9: SUM OF ALL RESPONSES TO PHQ QUESTIONS 1-9: 1

## 2019-09-06 NOTE — NURSING NOTE
Chief Complaint   Patient presents with     Follow Up     38 year old male with history of D-TGA s/p atrial switch (modified Henrik 4/23/81), a flutter s/p DCCV, chest pain, and shortness of breath presenting for follow up     Vitals were taken and medications were reconciled.     Amira SAWYERA  1:00 PM

## 2019-09-06 NOTE — LETTER
9/6/2019      RE: Cricket Chen  [address]  [place] [address]       Dear Colleague,    Thank you for the opportunity to participate in the care of your patient, Cricket Chen, at the [facility]. Please see a copy of my visit note below.    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  Pt reports that he has been feeling well.  He denies any chest pain or pressure, sob/lucia, orthopnea, pnd, palpitations, syncope/presyncope, suzette or change in exercise tolerance.  He also denies any problems with his medications and reports compliance.         Past Medical History:   Diagnosis Date     Alcohol abuse     Last drink in Mid-April 2014     Anemia in ESRD (end-stage renal disease) (H)      Anxiety 2008     Atrial flutter (H) 2017     Cirrhosis (H)     S/P liver transplant     Depression      History of blood transfusion      History of transposition of great vessels     atrial switch at age 8 months old     Hypertension      Liver transplant recipient (H)     2016     Papillary thyroid carcinoma (H)      Pneumonia 11-15-14     Renal transplant recipient     2016     Varices, esophageal (H)        Past Surgical History:   Procedure Laterality Date     ANESTHESIA CARDIOVERSION N/A 3/7/2018    Procedure: ANESTHESIA CARDIOVERSION;  Cardioversion;  Surgeon: GENERIC ANESTHESIA PROVIDER;  Location: [facility] OR     BENCH LIVER N/A 5/10/2016    Procedure: BENCH LIVER;  Surgeon: Ricky Deshpande MD;  [facility]     BIOPSY LYMPH NODE CERVICAL Right 1/26/2017    Procedure: BIOPSY  LYMPH NODE CERVICAL;  Surgeon: Beka Soto MD;  [facility]     CARDIAC SURGERY  9-10-80     CREATE FISTULA ARTERIOVENOUS UPPER EXTREMITY Right 9/16/2014    Procedure: CREATE FISTULA ARTERIOVENOUS UPPER EXTREMITY;  Surgeon: Padmaja Eaton MD;  [facility] OR     CV RIGHT HEART CATH N/A 4/19/2019    Procedure: CV RIGHT HEART CATH;  Surgeon: Sabi Wiggins MD;  Location:  HEART CARDIAC CATH LAB     CYSTOSCOPY, REMOVE STENT(S), COMBINED Right 6/22/2016    Procedure: COMBINED CYSTOSCOPY, REMOVE STENT(S);  Surgeon: Ricky Deshpande MD;  [facility]     EMBOLECTOMY UPPER EXTREMITY Right 8/17/2018    Procedure: EMBOLECTOMY UPPER EXTREMITY;  Repair Right Upper Arm Pseudo Aneursym ;  Surgeon: John Payton MD;  [facility] OR     ENT SURGERY       ESOPHAGOSCOPY, GASTROSCOPY, DUODENOSCOPY (EGD), COMBINED  5/30/2014    Procedure: COMBINED ESOPHAGOSCOPY, GASTROSCOPY, DUODENOSCOPY (EGD);  Surgeon: Guillaume Bautista MD;  Location:  GI     ESOPHAGOSCOPY, GASTROSCOPY, DUODENOSCOPY (EGD), COMBINED  9/30/14     ESOPHAGOSCOPY, GASTROSCOPY, DUODENOSCOPY (EGD), COMBINED Left 3/12/2015    Procedure: COMBINED ESOPHAGOSCOPY, GASTROSCOPY, DUODENOSCOPY (EGD);  Surgeon: Laureen Galvan MD;  Location:  GI     ESOPHAGOSCOPY, GASTROSCOPY, DUODENOSCOPY (EGD), COMBINED N/A 4/21/2016    Procedure: COMBINED ESOPHAGOSCOPY, GASTROSCOPY, DUODENOSCOPY (EGD);  Surgeon: Laureen Galvan MD;  Location:  GI     ESOPHAGOSCOPY, GASTROSCOPY, DUODENOSCOPY (EGD), COMBINED N/A 7/21/2016    Procedure: COMBINED ESOPHAGOSCOPY, GASTROSCOPY, DUODENOSCOPY (EGD);  Surgeon: Laureen Galvan MD;  [facility] GI     HC OR CATH ABLATION NON-CARDIAC ENDOVASCULAR  1990,2002    SVT     INSERT PORT VASCULAR ACCESS N/A 4/3/2017    Procedure: INSERT PORT VASCULAR ACCESS;  Surgeon: Rajendra Jacques PA-C;  [facility]     IR PORT REMOVAL LEFT  1/22/2019     LIGATE FISTULA ARTERIOVENOUS UPPER  EXTREMITY Right 2017    Procedure: LIGATE FISTULA ARTERIOVENOUS UPPER EXTREMITY;  Revision of Right Arm Arteriovenous Fistula ;  Surgeon: Padmaja Eaton MD;  [facility]: [facility]     PERCUTANEOUS BIOPSY KIDNEY Right 2018    Procedure: PERCUTANEOUS BIOPSY KIDNEY;  Right Kidney Biopsy;  Surgeon: Yuval Khan MD;  Location:  OR     PICC INSERTION  14    PICC line placement 14; Removal 2014     REMOVE PORT VASCULAR ACCESS Right 2019    Procedure: Right chest Port Removal;  Surgeon: Erasmo Ziegler PA-C;  Location:  OR     THORACIC SURGERY  1980    Transposition great arteries, repaired at 8 months     THYROIDECTOMY Right 2017    Procedure: THYROIDECTOMY;  Bilateral Total Thyroidectomy ;  Surgeon: Beka Soto MD;  [facility]     TRANSPLANT KIDNEY RECIPIENT  DONOR  5/10/2016    Procedure: TRANSPLANT KIDNEY RECIPIENT  DONOR;  Surgeon: Ricky Deshpande MD;  [facility]     TRANSPLANT LIVER RECIPIENT  DONOR N/A 5/10/2016    Procedure: TRANSPLANT LIVER RECIPIENT  DONOR;  Surgeon: Ricky Deshpande MD;  [facility]   As above, including HPI    Family History   Problem Relation Age of Onset     No Known Problems Brother      Arthritis Father      Hypertension Father      Hypertension Mother      Anxiety Disorder Mother      Hypertension Sister      No Known Problems Maternal Grandmother      No Known Problems Maternal Grandfather      No Known Problems Paternal Grandmother      No Known Problems Sister      No Known Problems Paternal Grandfather      Alcohol/Drug No family hx of      Gastrointestinal Disease No family hx of         no fam hx of liver disease or liver cancer       Meds    Current Outpatient Medications on File Prior to Visit:  apixaban ANTICOAGULANT (ELIQUIS) 5 MG tablet Take 1 tablet (5 mg) by mouth 2 times daily   busPIRone (BUSPAR) 15 MG tablet Take one tablet (15 mg) twice daily for anxiety.   desonide  "(DESOWEN) 0.05 % ointment Apply topically 2 times daily   hydrOXYzine (ATARAX) 50 MG tablet Take 1-2 tablets ( mg) by mouth nightly as needed for anxiety   levothyroxine (SYNTHROID/LEVOTHROID) 137 MCG tablet Take 1 tablet by mouth Monday - Saturday  and 1.5 tablets on Sunday   lisinopril (PRINIVIL/ZESTRIL) 10 MG tablet    LORazepam (ATIVAN) 0.5 MG tablet Take 1 tablet (0.5 mg) by mouth 2 times daily as needed (for panic attacks)   magnesium oxide (MAG-OX) 400 MG tablet TAKE ONE TABLET BY MOUTH TWICE A DAY   metoprolol succinate ER (TOPROL-XL) 25 MG 24 hr tablet Take 1 tablet (25 mg) by mouth daily   mycophenolate (GENERIC EQUIVALENT) 250 MG capsule Take 1 capsule (250 mg) by mouth 2 times daily   sulfamethoxazole-trimethoprim (BACTRIM/SEPTRA) 400-80 MG tablet Take 1 tablet by mouth daily   tacrolimus (GENERIC EQUIVALENT) 0.5 MG capsule Take 1 capsule (0.5 mg) by mouth every 12 hours *total dose 1.5 mg every 12 hours   tacrolimus (GENERIC EQUIVALENT) 1 MG capsule Take 1 capsule (1 mg) by mouth every 12 hours *total dose 1.5 mg every 12 hours   amLODIPine (NORVASC) 10 MG tablet    ketoconazole (NIZORAL) 2 % cream Twice daily to areas of rash on face (Patient not taking: Reported on 9/6/2019)   ketoconazole (NIZORAL) 2 % shampoo Use as a foaming face wash in shower daily (Patient not taking: Reported on 9/6/2019)     No current facility-administered medications on file prior to visit.         Allergies   Allergen Reactions     Hydromorphone Itching     ROS: 12 point ROS neg other than the symptoms noted above in the HPI.    /80 (BP Location: Right arm, Patient Position: Chair, Cuff Size: Adult Regular)   Pulse 64   Ht 1.778 m (5' 10\")   SpO2 98%   BMI 29.72 kg/m     General: appears comfortable, alert and articulate  Head: normocephalic, atraumatic  Eyes: anicteric sclera, EOMI  Neck: no adenopathy or masses, 2+ carotids without bruits  Orophyarynx: moist mucosa, no lesions, dentition intact  Heart: " regular, normal S1, loud S2, no murmur, gallop, or rub, estimated JVP <10 cm  Lungs: clear, no rales or wheezing  Abdomen: soft, non-tender, bowel sounds present, no hepatomegaly  Extremities: no clubbing, cyanosis or edema  Neurological: normal speech and affect, no gross motor deficits    Labs:  Orders Only on 09/03/2019   Component Date Value Ref Range Status     Bilirubin Direct 09/03/2019 0.2  0.0 - 0.2 mg/dL Final     Bilirubin Total 09/03/2019 0.8  0.2 - 1.3 mg/dL Final     Albumin 09/03/2019 4.3  3.4 - 5.0 g/dL Final     Protein Total 09/03/2019 7.2  6.8 - 8.8 g/dL Final     Alkaline Phosphatase 09/03/2019 70  40 - 150 U/L Final     ALT 09/03/2019 18  0 - 70 U/L Final     AST 09/03/2019 18  0 - 45 U/L Final     Tacrolimus Last Dose 09/03/2019 9/02/19 2230   Final     Tacrolimus Level 09/03/2019 4.7* 5.0 - 15.0 ug/L Final    Comment: Tacrolimus Reference Range  Kidney Transplant  Pediatric                      ug/L    0-3 months post transplant   10-12    3-6 months post transplant   8-10    6-12 months post transplant  6-8    >12 months post transplant   4-7  Adult    0-6 months post transplant   8-10    6-12 months post transplant  6-8    >12 months post transplant   4-6    >5 years post transplant     3-5  Heart Transplant  Pediatric    0-12 months post transplant  10-15    >12 months post transplant   5-10  Adult    0-3 months post transplant   10-15    3-6 months post transplant   8-12    6-12 months post transplant  6-12    >12 months post transplant   6-10  Lung Transplant    0-12 months post transplant  10-15    >12 months post transplant   8-12  Liver Transplant  Pediatric    0-3 months post transplant   10-15    3-6 months post transplant   8-10    >6 months post transplant    6-8  Adult    0-3 months post transplant   10-12    3-6 months post transplant   8-10    >6 months post transplant    6-8  Pancrea                           s Transplant    0-6 months post transplant   8-10    >6 months post  transplant    5-8  This test was developed and its performance characteristics determined by the   [facility],  Special Chemistry Laboratory. It has   not been cleared or approved by the FDA. The laboratory is regulated under   CLIA as qualified to perform high-complexity testing. This test is used for   clinical purposes. It should not be regarded as investigational or for   research.       WBC 09/03/2019 5.0  4.0 - 11.0 10e9/L Final     RBC Count 09/03/2019 5.30  4.4 - 5.9 10e12/L Final     Hemoglobin 09/03/2019 15.7  13.3 - 17.7 g/dL Final     Hematocrit 09/03/2019 45.8  40.0 - 53.0 % Final     MCV 09/03/2019 86  78 - 100 fl Final     MCH 09/03/2019 29.6  26.5 - 33.0 pg Final     MCHC 09/03/2019 34.3  31.5 - 36.5 g/dL Final     RDW 09/03/2019 13.0  10.0 - 15.0 % Final     Platelet Count 09/03/2019 140* 150 - 450 10e9/L Final     BK Virus Specimen 09/03/2019 Plasma   Final     BK Virus Result 09/03/2019 <500* BKNEG^BK Virus DNA Not Detected copies/mL Final    BK Virus DNA Detected below the reportable range of 500 copies/ml     BK Virus Log 09/03/2019 <2.7  <2.7 Log copies/mL Final    Comment: The Real-Time quantitative BK Virus assay was developed and its performance   characteristics determined by the [facility] Laboratory at   the [facility] in [place], [place]. The   primers and probes for each analyte are Analyte Specific Reagents (ASRs)   manufactured by [employer].  ASRs are used in many laboratory tests necessary for standard medical care and   generally do not require U.S. Food and Drug Administration approval. The FDA   has determined that such clearance or approval is not necessary.  This test is used for clinical purposes. It should not be regarded as   investigational or for research. This laboratory is certified under the   Clinical Laboratory Improvement Amendments of 1988 (CLIA-88) as qualified to   perform high  complexity clinical laboratory testing.       Sodium 09/03/2019 137  133 - 144 mmol/L Final     Potassium 09/03/2019 4.4  3.4 - 5.3 mmol/L Final     Chloride 09/03/2019 106  94 - 109 mmol/L Final     Carbon Dioxide 09/03/2019 25  20 - 32 mmol/L Final     Anion Gap 09/03/2019 6  3 - 14 mmol/L Final     Glucose 09/03/2019 88  70 - 99 mg/dL Final     Urea Nitrogen 09/03/2019 17  7 - 30 mg/dL Final     Creatinine 09/03/2019 1.66* 0.66 - 1.25 mg/dL Final     GFR Estimate 09/03/2019 51* >60 mL/min/[1.73_m2] Final    Comment: Non  GFR Calc  Starting 12/18/2018, serum creatinine based estimated GFR (eGFR) will be   calculated using the Chronic Kidney Disease Epidemiology Collaboration   (CKD-EPI) equation.       GFR Estimate If Black 09/03/2019 59* >60 mL/min/[1.73_m2] Final    Comment:  GFR Calc  Starting 12/18/2018, serum creatinine based estimated GFR (eGFR) will be   calculated using the Chronic Kidney Disease Epidemiology Collaboration   (CKD-EPI) equation.       Calcium 09/03/2019 9.0  8.5 - 10.1 mg/dL Final         Cardiac MRI 3/9/2017  1. Transposition of the great arteries with native atrioventricular   concordance and ventricular arterial discordance with the aorta anterior   to the pulmonary artery (D-transposition of the great arteries).  2. Status post interatrial baffle with the superior vena cava and inferior   vena cava baffled to the left (subpulmonic) ventricle without obstruction.    There is no evidence of a baffle leak.    3. Systemic right ventricle:  a. The right ventricle is hypertrophied.  b. Decreased systolic function with an ejection fraction of 33%.  c. Severe dilation of the right ventricle with an end-diastolic volume of   208 mL/m  (previously 188 mL/m ) and end-systolic volume of 140 mL/m    (previous 123 mL/m ).  4. The right ventricle connects with the anterior aorta.  The coronary   artery origins are usual for transposition.  Left-sided aortic arch with    measurements above.  5. The pulmonary venous baffle to the right ventricle is widely patent.  6. The subpulmonary left ventricle has normal systolic function with   decreased myocardial mass.  The left ventricle connects to the pulmonary   artery, which is posterior to the aorta.  7. No significant change from previous cardiac MRI of 9/4/2012 except an   increased size in the indexed right ventricular diastolic and systolic   Volumes    Echo (3/30/18):   Patient after atrial switch operation for complete transposition of the great arteries. Technically difficult study due to poor acoustic windows. No baffle obstruction or leaks seen. There is moderate right ventricular enlargement. Single plane right ventricular EF 35-40 %. Normal left ventricular systolic function. No outflow obstruction. Mild tricuspid regurgitation.    EKG (3/30:18): Sinus rhythm, RAD, RBBB, prominent RV forces with repolarization abnormality.      Echo 1/17/19  Patient after atrial switch operation for complete transposition of the great  arteries. Technically difficult study due to poor acoustic windows. There is  moderate right ventricular enlargement. The right ventricular function is  qualitatively moderately depressed. Qualitivley the right ventricle function  is unchanged form the 3/30/2018 echo. Mild (1+) tricuspid valve insufficiency.  No obvious baffle obstruction or leaks seen. Qualitatively normal left  ventricular systolic function. Mild (1+) pulmonary valve insufficiency.  Limited visualization of the LPA suggests narrowing. RPA not seen.    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    1.  TGA s/p atrial switch now with stably depressed systemic ventricular function:  Although ventricular function is depressed this has been stable for the past several years.  In regard to heart failure medical regimen, his systemic ventricle is an anatomic right ventricle and there is no proven therapies to improve morbidity or mortality in right ventricular failure and especially systemic right ventricular failure.  As he is currently not experiencing any symptoms and does not have any evidence of volume overload on today's exam, thus we will not change his medication regimen at this point in time - will continue current doses of metoprolol xl and lisinopril.Will obtain repeat cardiac MRI/MRA  2.  H/o atrial flutter s/p ablation: Seen by congenital EP today.  Please see their note for detailed findings and plan.     Follow-up:  In March unless MRI/MRA results dictate earlier f/u.      Francesca Alba MD  Section Head - Advanced Heart Failure, Transplantation and Mechanical Circulatory Support  Director - Adult Congenital and Cardiovascular Genetics Center  Associate Professor of Medicine, [facility]

## 2019-09-06 NOTE — PATIENT INSTRUCTIONS
"You were seen today in the Adult Congenital and Cardiovascular Genetics Clinic at the [facility].    Cardiology Providers you saw during your visit:  Dr Alba and Dr George    Diagnosis:  TGA    Results:  Dr Alba reviewed the results of your labs with you in clinic today    Recommendations:    1.  Continue to eat a heart healthy, low salt diet.  2.  Continue to get 20-30 minutes of aerobic activity, 4-5 days per week.  Examples of aerobic activity include walking, running, swimming, cycling, etc.  3.  Continue to observe good oral hygiene, with regular dental visits.  4.  We will schedule a cardiac MRI/MRA- for this test, nothing to eat or drink 3 hours prior.  No caffeine, alcohol, or tobacco 12 hours prior.      Vitals:    09/06/19 1257 09/06/19 1301   BP: (!) 141/90 122/80   BP Location: Left arm Right arm   Patient Position: Chair Chair   Cuff Size: Adult Regular Adult Regular   Pulse: 64    SpO2: 98%    Height: 1.778 m (5' 10\")        SBE prophylaxis:   Yes____  No__x__    Lifelong Bacterial Endocarditis Prophylaxis:  YES____  NO____    If YES is checked, follow the recommendations outlined below:   1. Take antibiotic(s) prior to recommended dental procedures and procedures on the respiratory tract or with infected skin, muscle or bones. SBE prophylaxis is not needed for routine GI and  procedures (ie. Colonoscopy or vaginal delivery)   2. Observe good oral hygiene daily, as advised by your dentist. Get regular professional dental care.   3. Keep cuts clean.   4. Infections should be treated promptly.   5. Symptoms of Infective Endocarditis could include: fever lasting more than 4-5 days or a recurrent fever that initially resolves but returns within 1-2 days)     Exercise restrictions:   Yes____  No__x__         If yes, list restrictions:  Must be allowed to rest if fatigued or SOB      Work restrictions:  Yes____  No__x__         If yes, list restrictions:    FASTING CHOLESTEROL was checked " in the last 5 years YES__x_  NO___  2018  Continue to eat a heart healthy, low salt diet.         ____ Fasting lipid panel order today         ____ No changes in medications          ____ I recommend the following changes in your cholesterol medications.:          ____ Please follow up for cholesterol screening at your primary care physician      Follow-up:  Follow up with Dr Alba and Dr George in March     For after hours urgent needs, call [phone] and ask to speak to the Adult Congenital Physician on call.  Mention Job Code 0401.    For emergencies call 101.    For any scheduling needs, please call [facility] , at [phone]  Thank you for your visit today!  If you have questions or concerns about today's visit, please call me.    Gagandeep Corado, RN, BSN  Cardiology Care Coordinator  [facility]  [phone]    [address]  Mail Code [id number]  [place] [address]

## 2019-09-06 NOTE — PROGRESS NOTES
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  Pt reports that he has been feeling well.  He denies any chest pain or pressure, sob/lucia, orthopnea, pnd, palpitations, syncope/presyncope, suzette or change in exercise tolerance.  He also denies any problems with his medications and reports compliance.         Past Medical History:   Diagnosis Date     Alcohol abuse     Last drink in Mid-April 2014     Anemia in ESRD (end-stage renal disease) (H)      Anxiety 2008     Atrial flutter (H) 2017     Cirrhosis (H)     S/P liver transplant     Depression      History of blood transfusion      History of transposition of great vessels     atrial switch at age 8 months old     Hypertension      Liver transplant recipient (H)     2016     Papillary thyroid carcinoma (H)      Pneumonia 11-15-14     Renal transplant recipient     2016     Varices, esophageal (H)        Past Surgical History:   Procedure Laterality Date     ANESTHESIA CARDIOVERSION N/A 3/7/2018    Procedure: ANESTHESIA CARDIOVERSION;  Cardioversion;  Surgeon: GENERIC ANESTHESIA PROVIDER;  Location:  OR     BENCH LIVER N/A 5/10/2016    Procedure: BENCH LIVER;  Surgeon: Ricky Deshpande MD;  Location: [facility]     BIOPSY LYMPH NODE CERVICAL Right 1/26/2017    Procedure: BIOPSY LYMPH NODE CERVICAL;  Surgeon: Beka Soto MD;  Location:  OR     CARDIAC SURGERY  9-10-80     CREATE FISTULA ARTERIOVENOUS UPPER EXTREMITY Right 9/16/2014    Procedure: CREATE FISTULA ARTERIOVENOUS UPPER EXTREMITY;  Surgeon: Padmaja Eaton MD;  Location:  OR     CV RIGHT HEART CATH N/A 4/19/2019    Procedure: CV RIGHT  HEART CATH;  Surgeon: Sabi Wiggins MD;  Location:  HEART CARDIAC CATH LAB     CYSTOSCOPY, REMOVE STENT(S), COMBINED Right 6/22/2016    Procedure: COMBINED CYSTOSCOPY, REMOVE STENT(S);  Surgeon: Ricky Deshpande MD;  [facility]     EMBOLECTOMY UPPER EXTREMITY Right 8/17/2018    Procedure: EMBOLECTOMY UPPER EXTREMITY;  Repair Right Upper Arm Pseudo Aneursym ;  Surgeon: John Payton MD;  Location:  OR     ENT SURGERY       ESOPHAGOSCOPY, GASTROSCOPY, DUODENOSCOPY (EGD), COMBINED  5/30/2014    Procedure: COMBINED ESOPHAGOSCOPY, GASTROSCOPY, DUODENOSCOPY (EGD);  Surgeon: Guillaume Bautista MD;  Location:  GI     ESOPHAGOSCOPY, GASTROSCOPY, DUODENOSCOPY (EGD), COMBINED  9/30/14     ESOPHAGOSCOPY, GASTROSCOPY, DUODENOSCOPY (EGD), COMBINED Left 3/12/2015    Procedure: COMBINED ESOPHAGOSCOPY, GASTROSCOPY, DUODENOSCOPY (EGD);  Surgeon: Laureen Galvan MD;  Location:  GI     ESOPHAGOSCOPY, GASTROSCOPY, DUODENOSCOPY (EGD), COMBINED N/A 4/21/2016    Procedure: COMBINED ESOPHAGOSCOPY, GASTROSCOPY, DUODENOSCOPY (EGD);  Surgeon: Laureen Galvan MD;  Location:  GI     ESOPHAGOSCOPY, GASTROSCOPY, DUODENOSCOPY (EGD), COMBINED N/A 7/21/2016    Procedure: COMBINED ESOPHAGOSCOPY, GASTROSCOPY, DUODENOSCOPY (EGD);  Surgeon: Laureen Galvan MD;  Location:  GI     HC OR CATH ABLATION NON-CARDIAC ENDOVASCULAR  1990,2002    SVT     INSERT PORT VASCULAR ACCESS N/A 4/3/2017    Procedure: INSERT PORT VASCULAR ACCESS;  Surgeon: Rajendra Jacques PA-C;  [facility]     IR PORT REMOVAL LEFT  1/22/2019     LIGATE FISTULA ARTERIOVENOUS UPPER EXTREMITY Right 11/7/2017    Procedure: LIGATE FISTULA ARTERIOVENOUS UPPER EXTREMITY;  Revision of Right Arm Arteriovenous Fistula ;  Surgeon: Padmaja Eaton MD;  [facility]     PERCUTANEOUS BIOPSY KIDNEY Right 9/26/2018    Procedure: PERCUTANEOUS BIOPSY KIDNEY;  Right Kidney Biopsy;  Surgeon: Yuval Khan MD;   Location:  OR     PICC INSERTION  14    PICC line placement 14; Removal 2014     REMOVE PORT VASCULAR ACCESS Right 2019    Procedure: Right chest Port Removal;  Surgeon: Erasmo Ziegler PA-C;  Location:  OR     THORACIC SURGERY  1980    Transposition great arteries, repaired at 8 months     THYROIDECTOMY Right 2017    Procedure: THYROIDECTOMY;  Bilateral Total Thyroidectomy ;  Surgeon: Beka Soto MD;  [facility]     TRANSPLANT KIDNEY RECIPIENT  DONOR  5/10/2016    Procedure: TRANSPLANT KIDNEY RECIPIENT  DONOR;  Surgeon: Ricky Deshpande MD;  Location:  OR     TRANSPLANT LIVER RECIPIENT  DONOR N/A 5/10/2016    Procedure: TRANSPLANT LIVER RECIPIENT  DONOR;  Surgeon: Ricky Deshpande MD;  Location:  OR   As above, including HPI    Family History   Problem Relation Age of Onset     No Known Problems Brother      Arthritis Father      Hypertension Father      Hypertension Mother      Anxiety Disorder Mother      Hypertension Sister      No Known Problems Maternal Grandmother      No Known Problems Maternal Grandfather      No Known Problems Paternal Grandmother      No Known Problems Sister      No Known Problems Paternal Grandfather      Alcohol/Drug No family hx of      Gastrointestinal Disease No family hx of         no fam hx of liver disease or liver cancer       Meds    Current Outpatient Medications on File Prior to Visit:  apixaban ANTICOAGULANT (ELIQUIS) 5 MG tablet Take 1 tablet (5 mg) by mouth 2 times daily   busPIRone (BUSPAR) 15 MG tablet Take one tablet (15 mg) twice daily for anxiety.   desonide (DESOWEN) 0.05 % ointment Apply topically 2 times daily   hydrOXYzine (ATARAX) 50 MG tablet Take 1-2 tablets ( mg) by mouth nightly as needed for anxiety   levothyroxine (SYNTHROID/LEVOTHROID) 137 MCG tablet Take 1 tablet by mouth Monday - Saturday  and 1.5 tablets on    lisinopril (PRINIVIL/ZESTRIL) 10 MG tablet   "  LORazepam (ATIVAN) 0.5 MG tablet Take 1 tablet (0.5 mg) by mouth 2 times daily as needed (for panic attacks)   magnesium oxide (MAG-OX) 400 MG tablet TAKE ONE TABLET BY MOUTH TWICE A DAY   metoprolol succinate ER (TOPROL-XL) 25 MG 24 hr tablet Take 1 tablet (25 mg) by mouth daily   mycophenolate (GENERIC EQUIVALENT) 250 MG capsule Take 1 capsule (250 mg) by mouth 2 times daily   sulfamethoxazole-trimethoprim (BACTRIM/SEPTRA) 400-80 MG tablet Take 1 tablet by mouth daily   tacrolimus (GENERIC EQUIVALENT) 0.5 MG capsule Take 1 capsule (0.5 mg) by mouth every 12 hours *total dose 1.5 mg every 12 hours   tacrolimus (GENERIC EQUIVALENT) 1 MG capsule Take 1 capsule (1 mg) by mouth every 12 hours *total dose 1.5 mg every 12 hours   amLODIPine (NORVASC) 10 MG tablet    ketoconazole (NIZORAL) 2 % cream Twice daily to areas of rash on face (Patient not taking: Reported on 9/6/2019)   ketoconazole (NIZORAL) 2 % shampoo Use as a foaming face wash in shower daily (Patient not taking: Reported on 9/6/2019)     No current facility-administered medications on file prior to visit.         Allergies   Allergen Reactions     Hydromorphone Itching     ROS: 12 point ROS neg other than the symptoms noted above in the HPI.    /80 (BP Location: Right arm, Patient Position: Chair, Cuff Size: Adult Regular)   Pulse 64   Ht 1.778 m (5' 10\")   SpO2 98%   BMI 29.72 kg/m    General: appears comfortable, alert and articulate  Head: normocephalic, atraumatic  Eyes: anicteric sclera, EOMI  Neck: no adenopathy or masses, 2+ carotids without bruits  Orophyarynx: moist mucosa, no lesions, dentition intact  Heart: regular, normal S1, loud S2, no murmur, gallop, or rub, estimated JVP <10 cm  Lungs: clear, no rales or wheezing  Abdomen: soft, non-tender, bowel sounds present, no hepatomegaly  Extremities: no clubbing, cyanosis or edema  Neurological: normal speech and affect, no gross motor deficits    Labs:  Orders Only on 09/03/2019 "   Component Date Value Ref Range Status     Bilirubin Direct 09/03/2019 0.2  0.0 - 0.2 mg/dL Final     Bilirubin Total 09/03/2019 0.8  0.2 - 1.3 mg/dL Final     Albumin 09/03/2019 4.3  3.4 - 5.0 g/dL Final     Protein Total 09/03/2019 7.2  6.8 - 8.8 g/dL Final     Alkaline Phosphatase 09/03/2019 70  40 - 150 U/L Final     ALT 09/03/2019 18  0 - 70 U/L Final     AST 09/03/2019 18  0 - 45 U/L Final     Tacrolimus Last Dose 09/03/2019 9/02/19 2230   Final     Tacrolimus Level 09/03/2019 4.7* 5.0 - 15.0 ug/L Final    Comment: Tacrolimus Reference Range  Kidney Transplant  Pediatric                      ug/L    0-3 months post transplant   10-12    3-6 months post transplant   8-10    6-12 months post transplant  6-8    >12 months post transplant   4-7  Adult    0-6 months post transplant   8-10    6-12 months post transplant  6-8    >12 months post transplant   4-6    >5 years post transplant     3-5  Heart Transplant  Pediatric    0-12 months post transplant  10-15    >12 months post transplant   5-10  Adult    0-3 months post transplant   10-15    3-6 months post transplant   8-12    6-12 months post transplant  6-12    >12 months post transplant   6-10  Lung Transplant    0-12 months post transplant  10-15    >12 months post transplant   8-12  Liver Transplant  Pediatric    0-3 months post transplant   10-15    3-6 months post transplant   8-10    >6 months post transplant    6-8  Adult    0-3 months post transplant   10-12    3-6 months post transplant   8-10    >6 months post transplant    6-8  Pancrea                           s Transplant    0-6 months post transplant   8-10    >6 months post transplant    5-8  This test was developed and its performance characteristics determined by the   [facility],  Special Chemistry Laboratory. It has   not been cleared or approved by the FDA. The laboratory is regulated under   CLIA as qualified to perform high-complexity testing. This test is  used for   clinical purposes. It should not be regarded as investigational or for   research.       WBC 09/03/2019 5.0  4.0 - 11.0 10e9/L Final     RBC Count 09/03/2019 5.30  4.4 - 5.9 10e12/L Final     Hemoglobin 09/03/2019 15.7  13.3 - 17.7 g/dL Final     Hematocrit 09/03/2019 45.8  40.0 - 53.0 % Final     MCV 09/03/2019 86  78 - 100 fl Final     MCH 09/03/2019 29.6  26.5 - 33.0 pg Final     MCHC 09/03/2019 34.3  31.5 - 36.5 g/dL Final     RDW 09/03/2019 13.0  10.0 - 15.0 % Final     Platelet Count 09/03/2019 140* 150 - 450 10e9/L Final     BK Virus Specimen 09/03/2019 Plasma   Final     BK Virus Result 09/03/2019 <500* BKNEG^BK Virus DNA Not Detected copies/mL Final    BK Virus DNA Detected below the reportable range of 500 copies/ml     BK Virus Log 09/03/2019 <2.7  <2.7 Log copies/mL Final    Comment: The Real-Time quantitative BK Virus assay was developed and its performance   characteristics determined by the [facility] Laboratory at   the [facility] in [place], [place]. The   primers and probes for each analyte are Analyte Specific Reagents (ASRs)   manufactured by [employer].  ASRs are used in many laboratory tests necessary for standard medical care and   generally do not require U.S. Food and Drug Administration approval. The FDA   has determined that such clearance or approval is not necessary.  This test is used for clinical purposes. It should not be regarded as   investigational or for research. This laboratory is certified under the   Clinical Laboratory Improvement Amendments of 1988 (CLIA-88) as qualified to   perform high complexity clinical laboratory testing.       Sodium 09/03/2019 137  133 - 144 mmol/L Final     Potassium 09/03/2019 4.4  3.4 - 5.3 mmol/L Final     Chloride 09/03/2019 106  94 - 109 mmol/L Final     Carbon Dioxide 09/03/2019 25  20 - 32 mmol/L Final     Anion Gap 09/03/2019 6  3 - 14 mmol/L Final     Glucose 09/03/2019 88  70 -  99 mg/dL Final     Urea Nitrogen 09/03/2019 17  7 - 30 mg/dL Final     Creatinine 09/03/2019 1.66* 0.66 - 1.25 mg/dL Final     GFR Estimate 09/03/2019 51* >60 mL/min/[1.73_m2] Final    Comment: Non  GFR Calc  Starting 12/18/2018, serum creatinine based estimated GFR (eGFR) will be   calculated using the Chronic Kidney Disease Epidemiology Collaboration   (CKD-EPI) equation.       GFR Estimate If Black 09/03/2019 59* >60 mL/min/[1.73_m2] Final    Comment:  GFR Calc  Starting 12/18/2018, serum creatinine based estimated GFR (eGFR) will be   calculated using the Chronic Kidney Disease Epidemiology Collaboration   (CKD-EPI) equation.       Calcium 09/03/2019 9.0  8.5 - 10.1 mg/dL Final         Cardiac MRI 3/9/2017  1. Transposition of the great arteries with native atrioventricular   concordance and ventricular arterial discordance with the aorta anterior   to the pulmonary artery (D-transposition of the great arteries).  2. Status post interatrial baffle with the superior vena cava and inferior   vena cava baffled to the left (subpulmonic) ventricle without obstruction.    There is no evidence of a baffle leak.    3. Systemic right ventricle:  a. The right ventricle is hypertrophied.  b. Decreased systolic function with an ejection fraction of 33%.  c. Severe dilation of the right ventricle with an end-diastolic volume of   208 mL/m  (previously 188 mL/m ) and end-systolic volume of 140 mL/m    (previous 123 mL/m ).  4. The right ventricle connects with the anterior aorta.  The coronary   artery origins are usual for transposition.  Left-sided aortic arch with   measurements above.  5. The pulmonary venous baffle to the right ventricle is widely patent.  6. The subpulmonary left ventricle has normal systolic function with   decreased myocardial mass.  The left ventricle connects to the pulmonary   artery, which is posterior to the aorta.  7. No significant change from previous cardiac  MRI of 9/4/2012 except an   increased size in the indexed right ventricular diastolic and systolic   Volumes    Echo (3/30/18):   Patient after atrial switch operation for complete transposition of the great arteries. Technically difficult study due to poor acoustic windows. No baffle obstruction or leaks seen. There is moderate right ventricular enlargement. Single plane right ventricular EF 35-40 %. Normal left ventricular systolic function. No outflow obstruction. Mild tricuspid regurgitation.    EKG (3/30:18): Sinus rhythm, RAD, RBBB, prominent RV forces with repolarization abnormality.      Echo 1/17/19  Patient after atrial switch operation for complete transposition of the great  arteries. Technically difficult study due to poor acoustic windows. There is  moderate right ventricular enlargement. The right ventricular function is  qualitatively moderately depressed. Qualitivley the right ventricle function  is unchanged form the 3/30/2018 echo. Mild (1+) tricuspid valve insufficiency.  No obvious baffle obstruction or leaks seen. Qualitatively normal left  ventricular systolic function. Mild (1+) pulmonary valve insufficiency.  Limited visualization of the LPA suggests narrowing. RPA not seen.    Mercedes Feb 2019      Assessment and Plan:  40 yo M with history of d - transposition of the great vessels s/p baffle repair in 1981 with small VSD stitch closure, atrial flutter s/p ablation and recent cardioversion, with stably depressed systemic RV function, s/p liver and kidney transplant for EtOH abuse, post-transplant lymphoproliferative disorder, papillary thyroid cancer s/p thryoidectomy presents for ongoing evaluation and management.    1.  TGA s/p atrial switch now with stably depressed systemic ventricular function:  Although ventricular function is depressed this has been stable for the past several years.  In regard to heart failure medical regimen, his systemic ventricle is an anatomic right ventricle and  there is no proven therapies to improve morbidity or mortality in right ventricular failure and especially systemic right ventricular failure.  As he is currently not experiencing any symptoms and does not have any evidence of volume overload on today's exam, thus we will not change his medication regimen at this point in time - will continue current doses of metoprolol xl and lisinopril.Will obtain repeat cardiac MRI/MRA  2.  H/o atrial flutter s/p ablation: Seen by congenital EP today.  Please see their note for detailed findings and plan.     Follow-up:  In March unless MRI/MRA results dictate earlier f/u.      Francesca Alba MD  Section Head - Advanced Heart Failure, Transplantation and Mechanical Circulatory Support  Director - Adult Congenital [facility]ovascular [facility]  Associate Professor of Medicine, [employer]      Answers for HPI/ROS submitted by the patient on 9/2/2019   General Symptoms: No  Skin Symptoms: No  HENT Symptoms: No  EYE SYMPTOMS: No  HEART SYMPTOMS: No  LUNG SYMPTOMS: No  INTESTINAL SYMPTOMS: No  URINARY SYMPTOMS: No  REPRODUCTIVE SYMPTOMS: No  SKELETAL SYMPTOMS: No  BLOOD SYMPTOMS: No  NERVOUS SYSTEM SYMPTOMS: No  MENTAL HEALTH SYMPTOMS: No

## 2019-09-10 ENCOUNTER — DOCUMENTATION ONLY (OUTPATIENT)
Dept: LAB | Facility: CLINIC | Age: 39
End: 2019-09-10

## 2019-09-10 ENCOUNTER — APPOINTMENT (OUTPATIENT)
Dept: LAB | Facility: CLINIC | Age: 39
End: 2019-09-10
Payer: COMMERCIAL

## 2019-09-10 DIAGNOSIS — Z94.0 KIDNEY TRANSPLANTED: Primary | ICD-10-CM

## 2019-09-10 NOTE — PROGRESS NOTES
Patient came in for a lab only appointment today 9/10/19. Patient is requesting creatinine testing. Please place order as future if testing is needed.   Thank you. [facility].

## 2019-09-26 ENCOUNTER — OFFICE VISIT (OUTPATIENT)
Dept: NEPHROLOGY | Facility: CLINIC | Age: 39
End: 2019-09-26
Attending: INTERNAL MEDICINE
Payer: COMMERCIAL

## 2019-09-26 VITALS
HEART RATE: 68 BPM | SYSTOLIC BLOOD PRESSURE: 126 MMHG | WEIGHT: 202.2 LBS | DIASTOLIC BLOOD PRESSURE: 85 MMHG | BODY MASS INDEX: 29.01 KG/M2 | TEMPERATURE: 98.2 F | OXYGEN SATURATION: 97 %

## 2019-09-26 DIAGNOSIS — Z23 NEED FOR IMMUNIZATION AGAINST INFLUENZA: ICD-10-CM

## 2019-09-26 DIAGNOSIS — R79.89 ELEVATED SERUM CREATININE: Primary | ICD-10-CM

## 2019-09-26 DIAGNOSIS — Z94.0 STATUS POST KIDNEY TRANSPLANT: ICD-10-CM

## 2019-09-26 DIAGNOSIS — R79.89 ELEVATED SERUM CREATININE: ICD-10-CM

## 2019-09-26 DIAGNOSIS — B34.8 BK VIREMIA: ICD-10-CM

## 2019-09-26 DIAGNOSIS — D84.9 IMMUNOSUPPRESSION (H): ICD-10-CM

## 2019-09-26 DIAGNOSIS — D47.Z1 POST-TRANSPLANT LYMPHOPROLIFERATIVE DISORDER (PTLD) (H): ICD-10-CM

## 2019-09-26 LAB
ALBUMIN UR-MCNC: NEGATIVE MG/DL
APPEARANCE UR: CLEAR
BILIRUB UR QL STRIP: NEGATIVE
COLOR UR AUTO: YELLOW
CREAT UR-MCNC: 81 MG/DL
GLUCOSE UR STRIP-MCNC: NEGATIVE MG/DL
HGB UR QL STRIP: NEGATIVE
KETONES UR STRIP-MCNC: NEGATIVE MG/DL
LEUKOCYTE ESTERASE UR QL STRIP: NEGATIVE
MUCOUS THREADS #/AREA URNS LPF: PRESENT /LPF
NITRATE UR QL: NEGATIVE
PH UR STRIP: 6 PH (ref 5–7)
PROT UR-MCNC: 0.08 G/L
PROT/CREAT 24H UR: 0.1 G/G CR (ref 0–0.2)
RBC #/AREA URNS AUTO: 0 /HPF (ref 0–2)
SOURCE: ABNORMAL
SP GR UR STRIP: 1.01 (ref 1–1.03)
UROBILINOGEN UR STRIP-MCNC: 0 MG/DL (ref 0–2)
WBC #/AREA URNS AUTO: <1 /HPF (ref 0–5)

## 2019-09-26 PROCEDURE — G0463 HOSPITAL OUTPT CLINIC VISIT: HCPCS | Mod: 25,ZF

## 2019-09-26 PROCEDURE — 25000128 H RX IP 250 OP 636: Mod: ZF | Performed by: INTERNAL MEDICINE

## 2019-09-26 PROCEDURE — 90686 IIV4 VACC NO PRSV 0.5 ML IM: CPT | Mod: ZF | Performed by: INTERNAL MEDICINE

## 2019-09-26 PROCEDURE — 81001 URINALYSIS AUTO W/SCOPE: CPT | Performed by: INTERNAL MEDICINE

## 2019-09-26 PROCEDURE — G0008 ADMIN INFLUENZA VIRUS VAC: HCPCS | Mod: ZF

## 2019-09-26 PROCEDURE — 84156 ASSAY OF PROTEIN URINE: CPT | Performed by: INTERNAL MEDICINE

## 2019-09-26 RX ADMIN — INFLUENZA A VIRUS A/BRISBANE/02/2018 IVR-190 (H1N1) ANTIGEN (FORMALDEHYDE INACTIVATED), INFLUENZA A VIRUS A/KANSAS/14/2017 X-327 (H3N2) ANTIGEN (FORMALDEHYDE INACTIVATED), INFLUENZA B VIRUS B/PHUKET/3073/2013 ANTIGEN (FORMALDEHYDE INACTIVATED), AND INFLUENZA B VIRUS B/MARYLAND/15/2016 BX-69A ANTIGEN (FORMALDEHYDE INACTIVATED) 0.5 ML: 15; 15; 15; 15 INJECTION, SUSPENSION INTRAMUSCULAR at 14:38

## 2019-09-26 ASSESSMENT — PAIN SCALES - GENERAL: PAINLEVEL: NO PAIN (0)

## 2019-09-26 NOTE — NURSING NOTE
Chief Complaint   Patient presents with     RECHECK     Kidney tx       /85 (BP Location: Left arm, Patient Position: Sitting, Cuff Size: Adult Regular)   Pulse 68   Temp 98.2  F (36.8  C) (Oral)   Wt 91.7 kg (202 lb 3.2 oz)   SpO2 97%   BMI 29.01 kg/m      Kelli Gusman CMA CMA    9/26/2019 2:14 PM

## 2019-09-26 NOTE — NURSING NOTE
Patient seen in clinic today by Dr. Khan.  Patient reports he had repeated his labs following an elevated creatinine on 9/3, but there are no results in the system.  Patient states labs were drawn at a  lab.   Explained we do not have results, and requested he hydrate and repeat a BMP within the next couple of days.

## 2019-09-26 NOTE — NURSING NOTE
"Injectable Influenza Immunization Documentation    1.  Has the patient received the information for the injectable influenza vaccine? YES     2. Is the patient 6 months of age or older? YES     3. Does the patient have any of the following contraindications?         Severe allergy to eggs? No     Severe allergic reaction to previous influenza vaccines? No   Severe allergy to latex? No       History of Guillain-Chester syndrome? No     Currently have a temperature greater than 100.4F? No        4.  Severely egg allergic patients should have flu vaccine eligibility assessed by an MD, RN, or pharmacist, and those who received flu vaccine should be observed for 15 min by an MD, RN, Pharmacist, Medical Technician, or member of clinic staff.\":     5. Latex-allergic patients should be given latex-free influenza vaccine . Please reference the Vaccine latex table to determine if your clinic s product is latex-containing.       Vaccination given by Rocío Bourgeois on 9/26/2019 at 2:39 PM          "

## 2019-09-26 NOTE — PROGRESS NOTES
CHRONIC TRANSPLANT NEPHROLOGY VISIT    Assessment & Plan   # DDKT:    - Baseline Cr ~ 1.2-1.5; elevated at 1.6 today   - Proteinuria: Normal (<0.2 grams)   - Date DSA Last Checked: Sep/2018       Latest DSA: No   - BK Viremia: Negative with last check, but h/o viremia   - Kidney Tx Biopsy: Sep 26, 2018; Result: No diagnostic evidence of acute rejection. Mild arteriosclerosis.     - Will order urinalysis for further evaluation.     I did recommend kidney biopsy if there is no immediate recognized cause of creatinine elevation from 1.3 to 1.6 mg / dl from biopsy in 2018. He is not interested in biopsy at this time.     # Liver Transplant:   - Liver function tests are normal.     # Immunosuppression: Tacrolimus IR (goal 4-6) and Mycophenolate mofetil (goal 1-3.5)   - Changes: No    # Prophylaxis:   - PJP: Sulfa/TMP (Bactrim)    # Hypertension: Controlled; Goal BP: < 130/80   - Changes: No    # Mineral Bone Disorder:   - Secondary renal hyperparathyroidism; PTH level: Not checked recently  - Vitamin D; level: Not checked recently  - Calcium; level: Normal  - Phosphorus; level: Not checked recently     # Electrolytes:   - Potassium; level: Normal  - Magnesium; level: Not checked recently  - Bicarbonate; level: Normal  - Sodium; level: Normal    # PTLD:               - Completed chemotherapy.              - Follow up with oncology in July of 2019.               - He had imaging in January of 2019 with no evidence of PTLD.     # Skin Cancer Risk:    - Discussed sun protection and recommend regular follow up with Dermatology.    # Health Maintenance: Patient will receive a flu shot today.     # Medical Compliance: Yes    # Transplant History:  Etiology of kidney failure: Liver failure   Tx: DDKT  Transplant: 5/11/2016 (Kidney), 5/10/2016 (Liver)  Donor Type: Donation after Circulatory Death  Donor Class:   Significant changes in immunosuppression: Mycophenolate reduced due to BK; prednisone started during  chemotherapy  Significant transplant-related complications: BK Viremia and EBV Viremia, papillary thyroid cancer, PTLD    Transplant Office Phone Number: [phone]    Assessment and plan was discussed with the patient and he voiced his understanding and agreement.    Return visit: 12 months.     Chief Complaint   Mr. Chen is a 39 year old here for routine follow up.    History of Present Illness   Cricket Chen is a 39 year old male with a history of ESKD secondary to liver failure status post DDKT completed on 5/11/16 who presents for routine follow up. He was last seen in clinic by Dr. West on 3/28/19; please see that note for further details.     His creatinine has been elevated as of late, which he attributes to not drinking enough water. Denies recent hospitalizations or new medical issues. Denies urinary symptoms. Denies chest pain or shortness of breath. Weight and appetite are stable. Denies chest pain, breathing difficulty, nausea, or vomiting.    Recent Hospitalizations:  [x] No [] Yes    New Medical Issues: [x] No [] Yes    Decreased energy: [x] No [] Yes    Chest pain or SOB with exertion:  [x] No [] Yes    Appetite change or weight change: [x] No [] Yes    Nausea, vomiting or diarrhea:  [x] No [] Yes    Fever, sweats or chills: [x] No [] Yes    Leg swelling: [x] No [] Yes      Home BP: Not checked.     Review of Systems   A comprehensive review of systems was obtained and negative, except as noted in the HPI or PMH.    Problem List   Patient Active Problem List   Diagnosis     Atrial flutter (H)     Complete transposition of great vessels     Esophageal varices (H)     Insomnia     Alcoholic hepatitis     History of alcohol abuse     History of transposition of great vessels     Depression     Thrombocytopenia (H)     Pain medication agreement     [facility]     Liver replaced by transplant (H)     Kidney replaced by  transplant     Immunosuppressed status (H)     Hypomagnesemia     Secondary renal hyperparathyroidism (H)     Chronic pain syndrome     Pain management contract signed     Aftercare following organ transplant     Post-transplant lymphoproliferative disorder (H)     Papillary thyroid carcinoma (H)     Advance directive discussed with patient     Acute alcoholic liver disease     Alcohol dependence (H)     Encounter for palliative care     Hypertension secondary to other renal disorders     Atrial fibrillation (H)     Postoperative hypothyroidism     Arm mass, right     Dermatitis, seborrheic     Chest pain     Shortness of breath     Other ill-defined heart diseases       Social History   Social History     Tobacco Use     Smoking status: Former Smoker     Packs/day: 0.33     Years: 3.00     Pack years: 0.99     Types: Cigarettes     Start date: 1997     Last attempt to quit: 2000     Years since quittin.0     Smokeless tobacco: Former User     Tobacco comment: Quit chewing in early    Substance Use Topics     Alcohol use: No     Alcohol/week: 0.0 standard drinks     Comment: ~10 drinks per day for ten years, quit in 2014     Drug use: No       Allergies   Allergies   Allergen Reactions     Hydromorphone Itching       Medications   Current Outpatient Medications   Medication Sig     apixaban ANTICOAGULANT (ELIQUIS) 5 MG tablet Take 1 tablet (5 mg) by mouth 2 times daily     busPIRone (BUSPAR) 15 MG tablet Take one tablet (15 mg) twice daily for anxiety.     desonide (DESOWEN) 0.05 % ointment Apply topically 2 times daily     hydrOXYzine (ATARAX) 50 MG tablet Take 1-2 tablets ( mg) by mouth nightly as needed for anxiety     ketoconazole (NIZORAL) 2 % cream Twice daily to areas of rash on face     ketoconazole (NIZORAL) 2 % shampoo Use as a foaming face wash in shower daily     levothyroxine (SYNTHROID/LEVOTHROID) 137 MCG tablet Take 1 tablet by mouth Monday - Saturday  and 1.5  tablets on Sunday     lisinopril (PRINIVIL/ZESTRIL) 10 MG tablet      magnesium oxide (MAG-OX) 400 MG tablet TAKE ONE TABLET BY MOUTH TWICE A DAY     metoprolol succinate ER (TOPROL-XL) 25 MG 24 hr tablet Take 1 tablet (25 mg) by mouth daily     mycophenolate (GENERIC EQUIVALENT) 250 MG capsule Take 1 capsule (250 mg) by mouth 2 times daily     sulfamethoxazole-trimethoprim (BACTRIM/SEPTRA) 400-80 MG tablet Take 1 tablet by mouth daily     tacrolimus (GENERIC EQUIVALENT) 0.5 MG capsule Take 1 capsule (0.5 mg) by mouth every 12 hours *total dose 1.5 mg every 12 hours     tacrolimus (GENERIC EQUIVALENT) 1 MG capsule Take 1 capsule (1 mg) by mouth every 12 hours *total dose 1.5 mg every 12 hours     amLODIPine (NORVASC) 10 MG tablet      LORazepam (ATIVAN) 0.5 MG tablet Take 1 tablet (0.5 mg) by mouth 2 times daily as needed (for panic attacks) (Patient not taking: Reported on 9/26/2019)     Current Facility-Administered Medications   Medication     [START ON 9/27/2019] influenza quadrivalent (PF) vacc (FLUZONE) injection 0.5 mL     There are no discontinued medications.    Physical Exam   Vital Signs: /85 (BP Location: Left arm, Patient Position: Sitting, Cuff Size: Adult Regular)   Pulse 68   Temp 98.2  F (36.8  C) (Oral)   Wt 91.7 kg (202 lb 3.2 oz)   SpO2 97%   BMI 29.01 kg/m      GENERAL APPEARANCE: alert and no distress  HENT: mouth without ulcers or lesions  LYMPHATICS: no cervical or supraclavicular nodes  RESP: lungs clear to auscultation - no rales, rhonchi or wheezes  CV: regular rhythm, normal rate, no rub, no murmur  EDEMA: no LE edema bilaterally  ABDOMEN: soft, nondistended, nontender, bowel sounds normal  MS: extremities normal - no gross deformities noted, no evidence of inflammation in joints, no muscle tenderness  SKIN: no rash  TX KIDNEY: normal  DIALYSIS ACCESS:  None      Data     Renal Latest Ref Rng & Units 9/3/2019 8/6/2019 7/16/2019   Na 133 - 144 mmol/L 137 136 132(L)   K 3.4 - 5.3  mmol/L 4.4 4.5 4.3   Cl 94 - 109 mmol/L 106 104 101   CO2 20 - 32 mmol/L 25 25 25   BUN 7 - 30 mg/dL 17 17 13   Cr 0.66 - 1.25 mg/dL 1.66(H) 1.50(H) 1.33(H)   Glucose 70 - 99 mg/dL 88 105(H) 83   Ca  8.5 - 10.1 mg/dL 9.0 9.2 8.8   Mg 1.6 - 2.3 mg/dL - - -     Bone Health Latest Ref Rng & Units 8/7/2017 8/7/2017 6/29/2017   Phos 2.5 - 4.5 mg/dL - - -   PTHi 12 - 72 pg/mL 167(H) 109(H) 173(H)   Vit D Def 20 - 75 ug/L - - 38     Heme Latest Ref Rng & Units 9/3/2019 8/6/2019 7/16/2019   WBC 4.0 - 11.0 10e9/L 5.0 4.9 4.0   Hgb 13.3 - 17.7 g/dL 15.7 14.7 14.5   Plt 150 - 450 10e9/L 140(L) 129(L) 130(L)     Liver Latest Ref Rng & Units 9/3/2019 7/16/2019 3/19/2019   AP 40 - 150 U/L 70 78 98   TBili 0.2 - 1.3 mg/dL 0.8 0.5 0.7   DBili 0.0 - 0.2 mg/dL 0.2 - 0.2   ALT 0 - 70 U/L 18 21 26   AST 0 - 45 U/L 18 18 21   Tot Protein 6.8 - 8.8 g/dL 7.2 6.9 7.3   Albumin 3.4 - 5.0 g/dL 4.3 4.0 4.0     Pancreas Latest Ref Rng & Units 3/6/2018 5/12/2016 5/10/2016   A1C 4.3 - 6.0 % - 5.1 -   Amylase 30 - 110 U/L - 47 100   Lipase 73 - 393 U/L 95 125 -     Iron studies Latest Ref Rng & Units 7/14/2016 6/21/2014   Iron 35 - 180 ug/dL 84 101   Iron sat 15 - 46 % 29 71(H)   Ferritin 26 - 388 ng/mL 171 -     [facility] Txp Virology Latest Ref Rng & Units 9/3/2019 8/6/2019 7/2/2019   CVM DNA Quant - - - -   BK Spec - Plasma Plasma Plasma   BK Res BKNEG:BK Virus DNA Not Detected copies/mL <500(A) <500(A) <500(A)   BK Log <2.7 Log copies/mL <2.7 <2.7 <2.7   Hep B Core NR - - -        Recent Labs   Lab Test 07/02/19  1117 08/06/19  1125 09/03/19  1115   DOSTAC 7/01/19 2315 8/05/19 2315 9/02/19 2230   TACROL 4.3* 4.7* 4.7*     Recent Labs   Lab Test 05/19/16  0714   DOSMPA 1,900   MPACID <0.25*   MPAG 51.0     Scribe Disclosure:  I, Rito Reza, am serving as a scribe to document services personally performed by Yuval Khan MD at this visit, based upon the provider's statements to me. All documentation has been reviewed by the aforementioned provider  prior to being entered into the official medical record.    IDavida, a scribe, prepared the chart for today's encounter.

## 2019-09-26 NOTE — LETTER
9/26/2019      RE: Cricket Conroynelli  [address]  [facility] [address]       CHRONIC TRANSPLANT NEPHROLOGY VISIT    Assessment & Plan   # DDKT:    - Baseline Cr ~ 1.2-1.5; elevated at 1.6 today   - Proteinuria: Normal (<0.2 grams)   - Date DSA Last Checked: Sep/2018       Latest DSA: No   - BK Viremia: Negative with last check, but h/o viremia   - Kidney Tx Biopsy: Sep 26, 2018; Result: No diagnostic evidence of acute rejection. Mild arteriosclerosis.     - Will order urinalysis for further evaluation.     I did recommend kidney biopsy if there is no immediate recognized cause of creatinine elevation from 1.3 to 1.6 mg / dl from biopsy in 2018. He is not interested in biopsy at this time.     # Liver Transplant:   - Liver function tests are normal.     # Immunosuppression: Tacrolimus IR (goal 4-6) and Mycophenolate mofetil (goal 1-3.5)   - Changes: No    # Prophylaxis:   - PJP: Sulfa/TMP (Bactrim)    # Hypertension: Controlled; Goal BP: < 130/80   - Changes: No    # Mineral Bone Disorder:   - Secondary renal hyperparathyroidism; PTH level: Not checked recently  - Vitamin D; level: Not checked recently  - Calcium; level: Normal  - Phosphorus; level: Not checked recently     # Electrolytes:   - Potassium; level: Normal  - Magnesium; level: Not checked recently  - Bicarbonate; level: Normal  - Sodium; level: Normal    # PTLD:               - Completed chemotherapy.              - Follow up with oncology in July of 2019.               - He had imaging in January of 2019 with no evidence of PTLD.     # Skin Cancer Risk:    - Discussed sun protection and recommend regular follow up with Dermatology.    # Health Maintenance: Patient will receive a flu shot today.     # Medical Compliance: Yes    # Transplant History:  Etiology of kidney failure: Liver failure   Tx: DDKT  Transplant: 5/11/2016 (Kidney), 5/10/2016 (Liver)  Donor Type: Donation after Circulatory Death  Donor Class:   Significant changes in  immunosuppression: Mycophenolate reduced due to BK; prednisone started during chemotherapy  Significant transplant-related complications: BK Viremia and EBV Viremia, papillary thyroid cancer, PTLD    Transplant Office Phone Number: [phone]    Assessment and plan was discussed with the patient and he voiced his understanding and agreement.    Return visit: 12 months.     Chief Complaint   Mr. Chen is a 39 year old here for routine follow up.    History of Present Illness   Cricket Chen is a 39 year old male with a history of ESKD secondary to liver failure status post DDKT completed on 5/11/16 who presents for routine follow up. He was last seen in clinic by Dr. West on 3/28/19; please see that note for further details.     His creatinine has been elevated as of late, which he attributes to not drinking enough water. Denies recent hospitalizations or new medical issues. Denies urinary symptoms. Denies chest pain or shortness of breath. Weight and appetite are stable. Denies chest pain, breathing difficulty, nausea, or vomiting.    Recent Hospitalizations:  [x] No [] Yes    New Medical Issues: [x] No [] Yes    Decreased energy: [x] No [] Yes    Chest pain or SOB with exertion:  [x] No [] Yes    Appetite change or weight change: [x] No [] Yes    Nausea, vomiting or diarrhea:  [x] No [] Yes    Fever, sweats or chills: [x] No [] Yes    Leg swelling: [x] No [] Yes      Home BP: Not checked.     Review of Systems   A comprehensive review of systems was obtained and negative, except as noted in the HPI or PMH.    Problem List   Patient Active Problem List   Diagnosis     Atrial flutter (H)     Complete transposition of great vessels     Esophageal varices (H)     Insomnia     Alcoholic hepatitis     History of alcohol abuse     History of transposition of great vessels     Depression     Thrombocytopenia (H)     Pain medication agreement     [facility]     Liver replaced by transplant (H)     Kidney replaced by transplant     Immunosuppressed status (H)     Hypomagnesemia     Secondary renal hyperparathyroidism (H)     Chronic pain syndrome     Pain management contract signed     Aftercare following organ transplant     Post-transplant lymphoproliferative disorder (H)     Papillary thyroid carcinoma (H)     Advance directive discussed with patient     Acute alcoholic liver disease     Alcohol dependence (H)     Encounter for palliative care     Hypertension secondary to other renal disorders     Atrial fibrillation (H)     Postoperative hypothyroidism     Arm mass, right     Dermatitis, seborrheic     Chest pain     Shortness of breath     Other ill-defined heart diseases       Social History   Social History     Tobacco Use     Smoking status: Former Smoker     Packs/day: 0.33     Years: 3.00     Pack years: 0.99     Types: Cigarettes     Start date: 1997     Last attempt to quit: 2000     Years since quittin.0     Smokeless tobacco: Former User     Tobacco comment: Quit chewing in early    Substance Use Topics     Alcohol use: No     Alcohol/week: 0.0 standard drinks     Comment: ~10 drinks per day for ten years, quit in 2014     Drug use: No       Allergies   Allergies   Allergen Reactions     Hydromorphone Itching       Medications   Current Outpatient Medications   Medication Sig     apixaban ANTICOAGULANT (ELIQUIS) 5 MG tablet Take 1 tablet (5 mg) by mouth 2 times daily     busPIRone (BUSPAR) 15 MG tablet Take one tablet (15 mg) twice daily for anxiety.     desonide (DESOWEN) 0.05 % ointment Apply topically 2 times daily     hydrOXYzine (ATARAX) 50 MG tablet Take 1-2 tablets ( mg) by mouth nightly as needed for anxiety     ketoconazole (NIZORAL) 2 % cream Twice daily to areas of rash on face     ketoconazole (NIZORAL) 2 % shampoo Use as a foaming face wash in shower daily     levothyroxine  (SYNTHROID/LEVOTHROID) 137 MCG tablet Take 1 tablet by mouth Monday - Saturday  and 1.5 tablets on Sunday     lisinopril (PRINIVIL/ZESTRIL) 10 MG tablet      magnesium oxide (MAG-OX) 400 MG tablet TAKE ONE TABLET BY MOUTH TWICE A DAY     metoprolol succinate ER (TOPROL-XL) 25 MG 24 hr tablet Take 1 tablet (25 mg) by mouth daily     mycophenolate (GENERIC EQUIVALENT) 250 MG capsule Take 1 capsule (250 mg) by mouth 2 times daily     sulfamethoxazole-trimethoprim (BACTRIM/SEPTRA) 400-80 MG tablet Take 1 tablet by mouth daily     tacrolimus (GENERIC EQUIVALENT) 0.5 MG capsule Take 1 capsule (0.5 mg) by mouth every 12 hours *total dose 1.5 mg every 12 hours     tacrolimus (GENERIC EQUIVALENT) 1 MG capsule Take 1 capsule (1 mg) by mouth every 12 hours *total dose 1.5 mg every 12 hours     amLODIPine (NORVASC) 10 MG tablet      LORazepam (ATIVAN) 0.5 MG tablet Take 1 tablet (0.5 mg) by mouth 2 times daily as needed (for panic attacks) (Patient not taking: Reported on 9/26/2019)     Current Facility-Administered Medications   Medication     [START ON 9/27/2019] influenza quadrivalent (PF) vacc (FLUZONE) injection 0.5 mL     There are no discontinued medications.    Physical Exam   Vital Signs: /85 (BP Location: Left arm, Patient Position: Sitting, Cuff Size: Adult Regular)   Pulse 68   Temp 98.2  F (36.8  C) (Oral)   Wt 91.7 kg (202 lb 3.2 oz)   SpO2 97%   BMI 29.01 kg/m       GENERAL APPEARANCE: alert and no distress  HENT: mouth without ulcers or lesions  LYMPHATICS: no cervical or supraclavicular nodes  RESP: lungs clear to auscultation - no rales, rhonchi or wheezes  CV: regular rhythm, normal rate, no rub, no murmur  EDEMA: no LE edema bilaterally  ABDOMEN: soft, nondistended, nontender, bowel sounds normal  MS: extremities normal - no gross deformities noted, no evidence of inflammation in joints, no muscle tenderness  SKIN: no rash  TX KIDNEY: normal  DIALYSIS ACCESS:  None      Data     Renal Latest Ref  Rng & Units 9/3/2019 8/6/2019 7/16/2019   Na 133 - 144 mmol/L 137 136 132(L)   K 3.4 - 5.3 mmol/L 4.4 4.5 4.3   Cl 94 - 109 mmol/L 106 104 101   CO2 20 - 32 mmol/L 25 25 25   BUN 7 - 30 mg/dL 17 17 13   Cr 0.66 - 1.25 mg/dL 1.66(H) 1.50(H) 1.33(H)   Glucose 70 - 99 mg/dL 88 105(H) 83   Ca  8.5 - 10.1 mg/dL 9.0 9.2 8.8   Mg 1.6 - 2.3 mg/dL - - -     Bone Health Latest Ref Rng & Units 8/7/2017 8/7/2017 6/29/2017   Phos 2.5 - 4.5 mg/dL - - -   PTHi 12 - 72 pg/mL 167(H) 109(H) 173(H)   Vit D Def 20 - 75 ug/L - - 38     Heme Latest Ref Rng & Units 9/3/2019 8/6/2019 7/16/2019   WBC 4.0 - 11.0 10e9/L 5.0 4.9 4.0   Hgb 13.3 - 17.7 g/dL 15.7 14.7 14.5   Plt 150 - 450 10e9/L 140(L) 129(L) 130(L)     Liver Latest Ref Rng & Units 9/3/2019 7/16/2019 3/19/2019   AP 40 - 150 U/L 70 78 98   TBili 0.2 - 1.3 mg/dL 0.8 0.5 0.7   DBili 0.0 - 0.2 mg/dL 0.2 - 0.2   ALT 0 - 70 U/L 18 21 26   AST 0 - 45 U/L 18 18 21   Tot Protein 6.8 - 8.8 g/dL 7.2 6.9 7.3   Albumin 3.4 - 5.0 g/dL 4.3 4.0 4.0     Pancreas Latest Ref Rng & Units 3/6/2018 5/12/2016 5/10/2016   A1C 4.3 - 6.0 % - 5.1 -   Amylase 30 - 110 U/L - 47 100   Lipase 73 - 393 U/L 95 125 -     Iron studies Latest Ref Rng & Units 7/14/2016 6/21/2014   Iron 35 - 180 ug/dL 84 101   Iron sat 15 - 46 % 29 71(H)   Ferritin 26 - 388 ng/mL 171 -     [facility] Txp Virology Latest Ref Rng & Units 9/3/2019 8/6/2019 7/2/2019   CVM DNA Quant - - - -   BK Spec - Plasma Plasma Plasma   BK Res BKNEG:BK Virus DNA Not Detected copies/mL <500(A) <500(A) <500(A)   BK Log <2.7 Log copies/mL <2.7 <2.7 <2.7   Hep B Core NR - - -        Recent Labs   Lab Test 07/02/19  1117 08/06/19  1125 09/03/19  1115   DOSTAC 7/01/19 2315 8/05/19 2315 9/02/19 2230   TACROL 4.3* 4.7* 4.7*     Recent Labs   Lab Test 05/19/16  0714   DOSMPA 1,900   MPACID <0.25*   MPAG 51.0     Scribe Disclosure:  I, Rito Reza, am serving as a scribe to document services personally performed by Yuval Khan MD at this visit, based upon the  provider's statements to me. All documentation has been reviewed by the aforementioned provider prior to being entered into the official medical record.    I, Davida Downing, a scribe, prepared the chart for today's encounter.     Kidney/Pancreas Recipient

## 2019-10-01 DIAGNOSIS — Z94.0 STATUS POST KIDNEY TRANSPLANT: ICD-10-CM

## 2019-10-01 LAB
ANION GAP SERPL CALCULATED.3IONS-SCNC: 5 MMOL/L (ref 3–14)
BUN SERPL-MCNC: 13 MG/DL (ref 7–30)
CALCIUM SERPL-MCNC: 9.1 MG/DL (ref 8.5–10.1)
CHLORIDE SERPL-SCNC: 101 MMOL/L (ref 94–109)
CO2 SERPL-SCNC: 25 MMOL/L (ref 20–32)
CREAT SERPL-MCNC: 1.63 MG/DL (ref 0.66–1.25)
GFR SERPL CREATININE-BSD FRML MDRD: 52 ML/MIN/{1.73_M2}
GLUCOSE SERPL-MCNC: 106 MG/DL (ref 70–99)
POTASSIUM SERPL-SCNC: 4.2 MMOL/L (ref 3.4–5.3)
SODIUM SERPL-SCNC: 131 MMOL/L (ref 133–144)

## 2019-10-01 PROCEDURE — 36415 COLL VENOUS BLD VENIPUNCTURE: CPT | Performed by: INTERNAL MEDICINE

## 2019-10-01 PROCEDURE — 80048 BASIC METABOLIC PNL TOTAL CA: CPT | Performed by: INTERNAL MEDICINE

## 2019-10-03 ENCOUNTER — TELEPHONE (OUTPATIENT)
Dept: TRANSPLANT | Facility: CLINIC | Age: 39
End: 2019-10-03

## 2019-10-03 NOTE — TELEPHONE ENCOUNTER
Creatinine rising, recently 1.63.  Dr. Khan to discuss with the transplant team next week.    Will come up with a plan.     Yuval Khan MD Hasebroock, Rebecca, RN             I will bring him up at the Wednesday conference. I recommended biopsy.  He was quite resistant. 09/2018 was negative, but creatinine was 1.3 mg / dl at that time, now 1.6 mg / dl.     d    Previous Messages      ----- Message -----   From: Erna Bland, RN   Sent: 10/2/2019   4:30 PM CDT   To: Yuval Khan MD   Subject: elevated creatinine                               Hi Dr. Khan-   You recently saw Isidro in clinic.  His creatinine was elevated and he was instructed to hydrate and repeat.  It remains elevated.  You ordered a ua that was clean.  He has a hx of BK, but was negative early September.       Drug levels have been at goal.  He has no DSA.     Had a biopsy 1 year ago, no rejection but showed mild arteriosclerosis.     Do you have any recommendations?     Betsy

## 2019-10-09 ENCOUNTER — TELEPHONE (OUTPATIENT)
Dept: TRANSPLANT | Facility: CLINIC | Age: 39
End: 2019-10-09

## 2019-10-09 ENCOUNTER — TEAM CONFERENCE (OUTPATIENT)
Dept: TRANSPLANT | Facility: CLINIC | Age: 39
End: 2019-10-09

## 2019-10-09 DIAGNOSIS — Z94.0 KIDNEY TRANSPLANTED: Primary | ICD-10-CM

## 2019-10-09 NOTE — TELEPHONE ENCOUNTER
Spoke with Isidro.  He is agreeable to get scheduled for a kidney transplant biopsy.    He is on Eliquis that is managed by cardiology.  I explained that this will need to be held for 5 days.    He will call his cardiologist office to confirm if this is okay and call back to schedule.

## 2019-10-09 NOTE — TELEPHONE ENCOUNTER
ISSUE:  Creatinine remains elevated above baseline at 1.63    PLAN:  Discussed with the transplant team at the transplant review meeting   Recommendation to perform a kidney transplant biopsy     OUTCOME:  Called Isidro to discuss recommendation for a kidney transplant biopsy.   No answer left v/m.  Patient prefers mychart communication, [employer] message sent explaining the recommendation for a biopsy.  Requested he return call to sot office to discuss further.

## 2019-10-10 ENCOUNTER — TELEPHONE (OUTPATIENT)
Dept: CARDIOLOGY | Facility: CLINIC | Age: 39
End: 2019-10-10

## 2019-10-10 NOTE — TELEPHONE ENCOUNTER
Post Kidney and Pancreas Transplant Team Conference  Date: 10/9/2019  Transplant Coordinator: Elvia Baires, Erna Bland     Attendees:  [x]  Dr. Sidhu  [] Katie Almeida LPN     [x]  Dr. Acuna [] Dennise Chandler, VAL [x] Sheree Keller LPN   [x]  Dr. Khan [] Karla Morton, RN     [x] Essence Pritchard RN [] Saravanan Lacey, DonnyD   [x] Dr. Strauss [x] Michelle Iqbal, VAL    [] Dr. Cartwright [] Nolberto Bronson RN    [x] Dr. Dunlap [] Netta Jack RN    [] Dr. Mustafa [x] Danyelle Hernández RN     [x] Betsy Bland, VAL    [] Surgery Fellow [] Chapis Blackwell RN    [] Sabi Mo, NP [] Kaylan Tavera RN        Verbal Plan Read Back:   Recommend kidney biopsy r\t Increased creatinine    Routed to RN Coordinator   Sheree Keller LPN

## 2019-10-10 NOTE — TELEPHONE ENCOUNTER
[facility] Call Center    Phone Message    May a detailed message be left on voicemail: yes    Reason for Call: Medication Question or concern regarding medication   Prescription Clarification  Name of Medication: apixaban ANTICOAGULANT (ELIQUIS) 5 MG tablet   Prescribing Provider: Dr. George   What on the order needs clarification? Patient states that per kidney team, they would like pt to stop taking eliquis for 5 days leading up to a biopsy (not scheduled yet). Please f/u w/ pt to discuss whether or not this is appropriate.          Action Taken: Message routed to:  [facility] ([facility] priscilla cardio

## 2019-10-11 NOTE — TELEPHONE ENCOUNTER
Discussed with Dr Alba.  Ok to hold Eliquis 3 days prior and may resume once his Nephrology team deems safe.    Called Cricket and left voicemail with this information.  Provided call back number for further questions or concerns.    Gagandeep Corado, RN  Cardiology RN Care Coordinator  [phone]

## 2019-10-15 ENCOUNTER — TELEPHONE (OUTPATIENT)
Dept: TRANSPLANT | Facility: CLINIC | Age: 39
End: 2019-10-15

## 2019-10-15 NOTE — TELEPHONE ENCOUNTER
Patient Call: General  Route to LPN    Reason for call: pt needs to schedule a kidney biopsy,   prefers late morning early afternoon    Call back needed? Yes    Return Call Needed  Same as documented in contacts section  When to return call?: Greater than one day: Route standard priority

## 2019-10-15 NOTE — TELEPHONE ENCOUNTER
Per Dr. Alba, ok to hold Eliquis.  Called Isidro to discuss scheduling biopsy.  No answer, left v/m requesting a return call.    Francesca Alba MD Hasebroock, Rebecca, VAL; Jaylen George MD             Ok to hold his Eliquis but only need to hold for 72 hours.  Can resume post-procedure whenever you feel is safe from a procedural standpoint.   Francesca    Previous Messages      ----- Message -----   From: Erna Bland, RN   Sent: 10/10/2019  10:04 AM CDT   To: Francesca Alba MD, Jaylen George MD   Subject: Holding Eliquis 5 days                           Hello-   The kidney transplant team is recommending a biopsy of Isirdo's kidney due to creatinine trending up.  We typically recommend holding blood thinners for 5 days prior to the biopsy to minimize the risk of bleeding.  Is it safe for Isidro to hold this for 5 days so the biopsy can be done outpatient?       Thanks,     Betsy Bland, RN, BSN   Post Kidney/Pancreas Transplant Coordinator   [phone]

## 2019-10-21 NOTE — TELEPHONE ENCOUNTER
Transplant Coordinator Renal Biopsy Communication    Call placed to Cricket Chen to discuss indication for kidney transplant biopsy per Dr. Khan.     Indication for transplant renal biopsy: elevated creatinine   Laterality: right  Date of biopsy: 10/30/19    Patient location within 70 miles of [facility]: Yes.    Cricket Chen's medication list was reviewed.   Anticoagulant: Eliquis   Ibuprofen: No.  Fish Oil:  No.  Medications held: Eliquis    Recent blood pressure readings are WNL or not applicable. Instructed to take medication, especially blood pressure medications, before [facility] at 0600 day of procedure.     Procedure expectations and duration of stay discussed. Expressed pt can expect a phone call from LPN/MA to confirm biopsy date/time/location/directions/review of medications.   Patient verbalized understanding they will need to arrive by 6 a.m. on 10/30/19 and plan to stay 4-5 hours post biopsy for monitoring. Pt has no additional questions at this time. Transplant Office phone number given to pt for future questions.      Erna Bland RN  Kidney/Pancreas Transplant Coordinator  [phone] option 5

## 2019-10-24 DIAGNOSIS — Z94.0 KIDNEY TRANSPLANTED: Primary | ICD-10-CM

## 2019-10-24 NOTE — TELEPHONE ENCOUNTER
LPN/MA  Renal Biopsy Communication    Call to pt to confirm renal biopsy procedure. Biopsy orders entered and patient aware of date 10/30/2019, in [facility] and to arrive at 6:00AM.     No need to be NPO.      Discussed anticoagulants (i.e. fish oil, ASA, Plavix, Coumadin, Ibuprofen). Pt confirms use of anticoagulants. Patient currently holding eliquis.    Take all medicine before arrival for biopsy and bring all medicine bottles with.      Report to 1st floor lab, then 5th floor for biopsy.      Use [employer] services and bring form of entertainment.     Discussed with patient need to stay overnight locally evening of procedure if live more than 70 miles away.    Patient verbalized understanding they will need to stay 4-5 hours post biopsy for monitoring.     Call placed to scheduling at [phone] to schedule and confirm biopsy date/time    Lab appointment scheduled for morning of biopsy.

## 2019-10-30 ENCOUNTER — RESULTS ONLY (OUTPATIENT)
Dept: OTHER | Facility: CLINIC | Age: 39
End: 2019-10-30

## 2019-10-30 ENCOUNTER — HOSPITAL ENCOUNTER (OUTPATIENT)
Facility: AMBULATORY SURGERY CENTER | Age: 39
End: 2019-10-30
Attending: INTERNAL MEDICINE
Payer: COMMERCIAL

## 2019-10-30 ENCOUNTER — RESULTS ONLY (OUTPATIENT)
Dept: ONCOLOGY | Facility: CLINIC | Age: 39
End: 2019-10-30

## 2019-10-30 ENCOUNTER — ANCILLARY PROCEDURE (OUTPATIENT)
Dept: RADIOLOGY | Facility: AMBULATORY SURGERY CENTER | Age: 39
End: 2019-10-30
Attending: INTERNAL MEDICINE
Payer: COMMERCIAL

## 2019-10-30 ENCOUNTER — HOSPITAL ENCOUNTER (OUTPATIENT)
Facility: CLINIC | Age: 39
Setting detail: SPECIMEN
Discharge: HOME | End: 2019-10-30
Admitting: INTERNAL MEDICINE
Payer: COMMERCIAL

## 2019-10-30 VITALS
SYSTOLIC BLOOD PRESSURE: 115 MMHG | TEMPERATURE: 98.4 F | OXYGEN SATURATION: 99 % | WEIGHT: 200 LBS | BODY MASS INDEX: 28.63 KG/M2 | HEART RATE: 81 BPM | RESPIRATION RATE: 16 BRPM | DIASTOLIC BLOOD PRESSURE: 69 MMHG | HEIGHT: 70 IN

## 2019-10-30 DIAGNOSIS — Z94.0 STATUS POST KIDNEY TRANSPLANT: ICD-10-CM

## 2019-10-30 DIAGNOSIS — Z94.0 KIDNEY TRANSPLANTED: ICD-10-CM

## 2019-10-30 DIAGNOSIS — Z94.4 LIVER REPLACED BY TRANSPLANT (H): ICD-10-CM

## 2019-10-30 DIAGNOSIS — I48.92 ATRIAL FLUTTER, UNSPECIFIED TYPE (H): ICD-10-CM

## 2019-10-30 LAB
ABO + RH BLD: NORMAL
ABO + RH BLD: NORMAL
ALBUMIN SERPL-MCNC: 4.1 G/DL (ref 3.4–5)
ALBUMIN UR-MCNC: NEGATIVE MG/DL
ALP SERPL-CCNC: 73 U/L (ref 40–150)
ALT SERPL W P-5'-P-CCNC: 25 U/L (ref 0–70)
ANION GAP SERPL CALCULATED.3IONS-SCNC: 8 MMOL/L (ref 3–14)
APPEARANCE UR: CLEAR
AST SERPL W P-5'-P-CCNC: 19 U/L (ref 0–45)
BASOPHILS # BLD AUTO: 0 10E9/L (ref 0–0.2)
BASOPHILS NFR BLD AUTO: 0.6 %
BILIRUB DIRECT SERPL-MCNC: 0.2 MG/DL (ref 0–0.2)
BILIRUB SERPL-MCNC: 0.8 MG/DL (ref 0.2–1.3)
BILIRUB UR QL STRIP: NEGATIVE
BLD GP AB SCN SERPL QL: NORMAL
BLOOD BANK CMNT PATIENT-IMP: NORMAL
BUN SERPL-MCNC: 16 MG/DL (ref 7–30)
CALCIUM SERPL-MCNC: 9 MG/DL (ref 8.5–10.1)
CHLORIDE SERPL-SCNC: 103 MMOL/L (ref 94–109)
CO2 SERPL-SCNC: 25 MMOL/L (ref 20–32)
COLOR UR AUTO: ABNORMAL
CREAT SERPL-MCNC: 1.62 MG/DL (ref 0.66–1.25)
CREAT UR-MCNC: 81 MG/DL
DIFFERENTIAL METHOD BLD: NORMAL
DONOR IDENTIFICATION: NORMAL
DSA COMMENTS: NORMAL
DSA PRESENT: NO
DSA TEST METHOD: NORMAL
EOSINOPHIL # BLD AUTO: 0 10E9/L (ref 0–0.7)
EOSINOPHIL NFR BLD AUTO: 0.8 %
ERYTHROCYTE [DISTWIDTH] IN BLOOD BY AUTOMATED COUNT: 12.3 % (ref 10–15)
GFR SERPL CREATININE-BSD FRML MDRD: 53 ML/MIN/{1.73_M2}
GLUCOSE SERPL-MCNC: 84 MG/DL (ref 70–99)
GLUCOSE UR STRIP-MCNC: NEGATIVE MG/DL
HCT VFR BLD AUTO: 43.6 % (ref 40–53)
HGB BLD-MCNC: 14.7 G/DL (ref 13.3–17.7)
HGB BLD-MCNC: 15.7 G/DL (ref 13.3–17.7)
HGB UR QL STRIP: NEGATIVE
IMM GRANULOCYTES # BLD: 0 10E9/L (ref 0–0.4)
IMM GRANULOCYTES NFR BLD: 0.4 %
INR PPP: 1.13 (ref 0.86–1.14)
KETONES UR STRIP-MCNC: NEGATIVE MG/DL
LEUKOCYTE ESTERASE UR QL STRIP: NEGATIVE
LYMPHOCYTES # BLD AUTO: 1.4 10E9/L (ref 0.8–5.3)
LYMPHOCYTES NFR BLD AUTO: 25.5 %
MCH RBC QN AUTO: 29.4 PG (ref 26.5–33)
MCHC RBC AUTO-ENTMCNC: 33.7 G/DL (ref 31.5–36.5)
MCV RBC AUTO: 87 FL (ref 78–100)
MICROALBUMIN UR-MCNC: 7 MG/L
MICROALBUMIN/CREAT UR: 8.08 MG/G CR (ref 0–17)
MONOCYTES # BLD AUTO: 0.5 10E9/L (ref 0–1.3)
MONOCYTES NFR BLD AUTO: 8.4 %
MUCOUS THREADS #/AREA URNS LPF: PRESENT /LPF
NEUTROPHILS # BLD AUTO: 3.4 10E9/L (ref 1.6–8.3)
NEUTROPHILS NFR BLD AUTO: 64.3 %
NITRATE UR QL: NEGATIVE
NRBC # BLD AUTO: 0 10*3/UL
NRBC BLD AUTO-RTO: 0 /100
ORGAN: NORMAL
PH UR STRIP: 6 PH (ref 5–7)
PLATELET # BLD AUTO: 152 10E9/L (ref 150–450)
POTASSIUM SERPL-SCNC: 4 MMOL/L (ref 3.4–5.3)
PROT SERPL-MCNC: 6.9 G/DL (ref 6.8–8.8)
PROT UR-MCNC: 0.06 G/L
PROT/CREAT 24H UR: 0.08 G/G CR (ref 0–0.2)
RBC # BLD AUTO: 5 10E12/L (ref 4.4–5.9)
RBC #/AREA URNS AUTO: 1 /HPF (ref 0–2)
SA1 CELL: NORMAL
SA1 COMMENTS: NORMAL
SA1 HI RISK ABY: NORMAL
SA1 MOD RISK ABY: NORMAL
SA1 TEST METHOD: NORMAL
SA2 CELL: NORMAL
SA2 COMMENTS: NORMAL
SA2 HI RISK ABY UA: NORMAL
SA2 MOD RISK ABY: NORMAL
SA2 TEST METHOD: NORMAL
SODIUM SERPL-SCNC: 136 MMOL/L (ref 133–144)
SOURCE: ABNORMAL
SP GR UR STRIP: 1.01 (ref 1–1.03)
SPECIMEN EXP DATE BLD: NORMAL
TACROLIMUS BLD-MCNC: 5.2 UG/L (ref 5–15)
TME LAST DOSE: NORMAL H
UROBILINOGEN UR STRIP-MCNC: 0 MG/DL (ref 0–2)
WBC # BLD AUTO: 5.3 10E9/L (ref 4–11)
WBC #/AREA URNS AUTO: <1 /HPF (ref 0–5)

## 2019-10-30 ASSESSMENT — MIFFLIN-ST. JEOR: SCORE: 1828.44

## 2019-10-30 NOTE — PROCEDURES
Kidney Transplant Biopsy Procedure Note    Indication/Diagnosis:  Kidney transplant with elevated creatinine    Procedure:  The indications and risks of the kidney biopsy, including bleeding severe enough to require hospitalization, transfusion, surgery, or even loss of the kidney or death, were explained, understood and agreed.  Consent was signed.  The kidney, located in the right lower quadrant, was visualized under ultrasound and biopsy site marked.  Patient was prepped and draped in the usual sterile manner.  Biopsy site was anesthetized with 1% lidocaine.  Biopsy consisted of 3 passes with a 18 ga needle under direct ultrasound guidance were made and tissue was sent to pathology.  There were no immediate complications.  Pressure was held over biopsy site and patient will be observed for signs of bleeding or other complications.  Patient remained hemodynamically stable during and early post procedure.

## 2019-10-30 NOTE — H&P
Nephrology Procedure H&P  10/30/2019     Assessment & Recommendations:   1. DDKT (SLK) - baseline creatinine ~ 1.2-1.5, which has trended up.  Normal proteinuria. Will plan on kidney transplant biopsy to evaluate for etiology of elevated serum creatinine.  Possible causes include, but not li-mited to, chronic changes versus acute rejection.     Transplant History:  Transplant: 5/11/2016 (Kidney), 5/10/2016 (Liver)        Donor Class:   Crossmatch at time of Transplant:    DSA at time of Transplant:  No  Present Maintenance Immunosuppression:  Tacrolimus and Mycophenolate mofetil  Baseline creatinine:  1.2-1.5  Latest DSA lab date:  PRA:  Class I:   SA1 Comments   Date Value Ref Range Status   09/26/2018   Final     Test performed by modified procedure. Serum heat inactivated and tested   by a modified (Effie) protocol including fetal calf serum addition.   High-risk, mfi >3,000. Mod-risk, mfi 500-3,000.         Class II:    SA2 Comments   Date Value Ref Range Status   09/26/2018   Final     Test performed by modified procedure. Serum heat inactivated and tested   by a modified (Effie) protocol including fetal calf serum addition.   High-risk, mfi >3,000. Mod-risk, mfi 500-3,000.       Biopsy:  Yes: 2018  Rejection History:  No  Significant Complications: BK Viremia, EBV Viremia and Post-Transplant Lymphoproliferative Disorder (PTLD)  Transplant Coordinator: Erna Mckeon   Transplant Office Phone Number:  [phone]     2. Hypertension - well controlled at target of less than < 130/80. No changes.  3. Liver Transplant: appears stable.  4. BK Viremia: detectable, but less than quantifiable with last check.  5. H/o PTLD: no evidence of recurrence and EBV viremia was negative with last check.  6. Paroxysmal atrial fibrillation: no recent episodes.  Patient can restart Eliquis in 5 days.    Reason for Visit:  Mr. Chen is here for elevated creatinine and potential kidney transplant  biopsy.    History of Present Illness:  Cricket Chen is a 39 year old male with ESKD from hepatorenal syndrome (HRS) with ESLD secondary to alcoholic cirrhosis and is status post DDKT and Liver Tx (SLK) on 5/11/16.    Patient reports feeling good overall with minimal medical complaints.  His post transplant course was c/b PTLD and s/p R-CEOP with remission.  He was also diagnosed with papillary thyroid cancer and s/p thyroidectomy.  His immunosuppression was lowered during this time.  Patient's baseline creatinine ~ 1.0-1.2 initially, but more recently had increased to ~ 1.2-1.5 range.  Patient had a kidney transplant biopsy 9/26/18 that showed no acute rejection.  His creatinine now has trended up to ~ 1.6-1.7 range.    His energy level is good and pretty much normal.  He is active and denies any chest pain or shortness of breath with exertion.  He has had an episode of atrial fibrillation, but no recent palpitations.  Appetite is good and weight is stable.  No nausea, vomiting or diarrhea, outside of a couple days of loose stools just once a day.  No fever, sweats or chills.  No leg swelling.    Pain Over Kidney Tx:  No Pain or Burning with Urination:  No  Gross Hematuria:  No  Taking NSAIDs:  No    Home BP: Not checked    Review of Systems:  A comprehensive review of systems was obtained and negative, except as noted in the History of Present Illness or Active Medical Problems.    Active Medical Problems:  Patient Active Problem List    Diagnosis     Post-transplant lymphoproliferative disorder (H)     Shortness of breath     Other ill-defined heart diseases     Chest pain     Dermatitis, seborrheic     Arm mass, right     Postoperative hypothyroidism     Atrial fibrillation (H)     Hypertension secondary to other renal disorders     Papillary thyroid carcinoma (H)     Aftercare following organ transplant     Chronic pain syndrome     Pain management contract signed     Hypomagnesemia     Secondary renal  hyperparathyroidism (H)     Immunosuppressed status (H)     Liver replaced by transplant (H)     Kidney replaced by transplant     [facility]     Pain medication agreement     Thrombocytopenia (H)     History of transposition of great vessels     Depression     History of alcohol abuse     Alcoholic hepatitis     Insomnia     Esophageal varices (H)     Atrial flutter (H)     Encounter for palliative care     Acute alcoholic liver disease     Complete transposition of great vessels     Advance directive discussed with patient     Alcohol dependence (H)     Current Medications:  Current Outpatient Medications   Medication Sig Dispense Refill     apixaban ANTICOAGULANT (ELIQUIS ANTICOAGULANT) 5 MG tablet Take 1 tablet (5 mg) by mouth 2 times daily 180 tablet 2     busPIRone (BUSPAR) 15 MG tablet Take one tablet (15 mg) twice daily for anxiety. 180 tablet 1     desonide (DESOWEN) 0.05 % ointment Apply topically 2 times daily 15 g 3     hydrOXYzine (ATARAX) 50 MG tablet Take 1-2 tablets ( mg) by mouth nightly as needed for anxiety 90 tablet 1     levothyroxine (SYNTHROID/LEVOTHROID) 137 MCG tablet Take 1 tablet by mouth Monday - Saturday  and 1.5 tablets on Sunday 96 tablet 3     lisinopril (PRINIVIL/ZESTRIL) 10 MG tablet        magnesium oxide (MAG-OX) 400 MG tablet TAKE ONE TABLET BY MOUTH TWICE A  tablet 11     metoprolol succinate ER (TOPROL-XL) 25 MG 24 hr tablet Take 1 tablet (25 mg) by mouth daily 90 tablet 3     mycophenolate (GENERIC EQUIVALENT) 250 MG capsule Take 1 capsule (250 mg) by mouth 2 times daily 60 capsule 11     sulfamethoxazole-trimethoprim (BACTRIM/SEPTRA) 400-80 MG tablet Take 1 tablet by mouth daily 30 tablet 11     tacrolimus (GENERIC EQUIVALENT) 0.5 MG capsule Take 1 capsule (0.5 mg) by mouth every 12 hours *total dose 1.5 mg every 12 hours 60 capsule 11     tacrolimus (GENERIC EQUIVALENT) 1 MG capsule Take 1  "capsule (1 mg) by mouth every 12 hours *total dose 1.5 mg every 12 hours 60 capsule 11     ketoconazole (NIZORAL) 2 % cream Twice daily to areas of rash on face 60 g 1     ketoconazole (NIZORAL) 2 % shampoo Use as a foaming face wash in shower daily 120 mL 3     Vitals:  /84   Pulse 61   Temp 98.2  F (36.8  C) (Oral)   Resp 16   Ht 1.778 m (5' 10\")   Wt 90.7 kg (200 lb)   SpO2 98%   BMI 28.70 kg/m      Physical Exam:   GENERAL APPEARANCE: alert and no distress  HENT: mouth without ulcers or lesions  PULM: lungs clear to auscultation, equal air movement  CV: regular rhythm, normal rate     - no LE edema bilaterally  GI: soft, nontender, bowel sounds are normal  MS: no evidence of inflammation in joints, no muscle tenderness  TX KIDNEY: nontender    Labs:   All labs reviewed by me  Recent Results (from the past 8 hour(s))   ABO/Rh type and screen    Collection Time: 10/30/19  6:34 AM   Result Value Ref Range    ABO PENDING     Antibody Screen PENDING     Test Valid Only At          [facility]    Specimen Expires 11/02/2019    INR    Collection Time: 10/30/19  6:34 AM   Result Value Ref Range    INR 1.13 0.86 - 1.14   CBC with platelets differential    Collection Time: 10/30/19  6:34 AM   Result Value Ref Range    WBC 5.3 4.0 - 11.0 10e9/L    RBC Count 5.00 4.4 - 5.9 10e12/L    Hemoglobin 14.7 13.3 - 17.7 g/dL    Hematocrit 43.6 40.0 - 53.0 %    MCV 87 78 - 100 fl    MCH 29.4 26.5 - 33.0 pg    MCHC 33.7 31.5 - 36.5 g/dL    RDW 12.3 10.0 - 15.0 %    Platelet Count 152 150 - 450 10e9/L    Diff Method Automated Method     % Neutrophils 64.3 %    % Lymphocytes 25.5 %    % Monocytes 8.4 %    % Eosinophils 0.8 %    % Basophils 0.6 %    % Immature Granulocytes 0.4 %    Nucleated RBCs 0 0 /100    Absolute Neutrophil 3.4 1.6 - 8.3 10e9/L    Absolute Lymphocytes 1.4 0.8 - 5.3 10e9/L    Absolute Monocytes 0.5 0.0 - 1.3 10e9/L    Absolute Eosinophils 0.0 0.0 - 0.7 10e9/L "    Absolute Basophils 0.0 0.0 - 0.2 10e9/L    Abs Immature Granulocytes 0.0 0 - 0.4 10e9/L    Absolute Nucleated RBC 0.0    Basic metabolic panel    Collection Time: 10/30/19  6:34 AM   Result Value Ref Range    Sodium 136 133 - 144 mmol/L    Potassium 4.0 3.4 - 5.3 mmol/L    Chloride 103 94 - 109 mmol/L    Carbon Dioxide 25 20 - 32 mmol/L    Anion Gap 8 3 - 14 mmol/L    Glucose 84 70 - 99 mg/dL    Urea Nitrogen 16 7 - 30 mg/dL    Creatinine 1.62 (H) 0.66 - 1.25 mg/dL    GFR Estimate 53 (L) >60 mL/min/[1.73_m2]    GFR Estimate If Black 61 >60 mL/min/[1.73_m2]    Calcium 9.0 8.5 - 10.1 mg/dL   Hepatic panel    Collection Time: 10/30/19  6:34 AM   Result Value Ref Range    Bilirubin Direct 0.2 0.0 - 0.2 mg/dL    Bilirubin Total 0.8 0.2 - 1.3 mg/dL    Albumin 4.1 3.4 - 5.0 g/dL    Protein Total 6.9 6.8 - 8.8 g/dL    Alkaline Phosphatase 73 40 - 150 U/L    ALT 25 0 - 70 U/L    AST 19 0 - 45 U/L   Protein  random urine with Creat Ratio    Collection Time: 10/30/19  6:40 AM   Result Value Ref Range    Protein Random Urine 0.06 g/L    Protein Total Urine g/gr Creatinine 0.08 0 - 0.2 g/g Cr   Albumin Random Urine Quantitative with Creat Ratio    Collection Time: 10/30/19  6:40 AM   Result Value Ref Range    Creatinine Urine 81 mg/dL    Albumin Urine mg/L 7 mg/L    Albumin Urine mg/g Cr 8.08 0 - 17 mg/g Cr   Routine UA with microscopic    Collection Time: 10/30/19  6:40 AM   Result Value Ref Range    Color Urine Straw     Appearance Urine Clear     Glucose Urine Negative NEG^Negative mg/dL    Bilirubin Urine Negative NEG^Negative    Ketones Urine Negative NEG^Negative mg/dL    Specific Gravity Urine 1.008 1.003 - 1.035    Blood Urine Negative NEG^Negative    pH Urine 6.0 5.0 - 7.0 pH    Protein Albumin Urine Negative NEG^Negative mg/dL    Urobilinogen mg/dL 0.0 0.0 - 2.0 mg/dL    Nitrite Urine Negative NEG^Negative    Leukocyte Esterase Urine Negative NEG^Negative    Source Midstream Urine     WBC Urine <1 0 - 5 /HPF    RBC  Urine 1 0 - 2 /HPF    Mucous Urine Present (A) NEG^Negative /LPF        Jake Sidhu MD

## 2019-11-01 DIAGNOSIS — Z94.0 KIDNEY REPLACED BY TRANSPLANT: ICD-10-CM

## 2019-11-01 LAB
BKV DNA # SPEC NAA+PROBE: <500 COPIES/ML
BKV DNA SPEC NAA+PROBE-LOG#: <2.7 LOG COPIES/ML
COPATH REPORT: NORMAL
SPECIMEN SOURCE: ABNORMAL

## 2019-11-01 NOTE — TELEPHONE ENCOUNTER
Jake Sidhu MD Hasebroock, Rebecca, RN Cc: Elvia Baires RN Becca,     His biopsy showed no diagnostic evidence of acute rejection, but he does have inflamed IFTA (interstitial fibrosis and tubular atrophy) and moderate chronic changes.     I would recommend increasing his mycophenolate mofetil to 500 mg bid.     I tried calling him to let him know, but couldn't reach him.  Could you please let him know.     Thanks.        Called Cricket Chen with the above information   Isidro verbalized understanding to increase  Cellcept mycophenolate mofetil   General review   Of biopsy  Results regarding no rejection

## 2019-11-02 ENCOUNTER — HEALTH MAINTENANCE LETTER (OUTPATIENT)
Age: 39
End: 2019-11-02

## 2019-11-04 RX ORDER — MYCOPHENOLATE MOFETIL 250 MG/1
500 CAPSULE ORAL 2 TIMES DAILY
Qty: 60 CAPSULE | Refills: 11 | Status: SHIPPED | OUTPATIENT
Start: 2019-11-04 | End: 2019-11-11

## 2019-11-11 ENCOUNTER — TELEPHONE (OUTPATIENT)
Dept: TRANSPLANT | Facility: CLINIC | Age: 39
End: 2019-11-11

## 2019-11-11 DIAGNOSIS — Z94.0 KIDNEY REPLACED BY TRANSPLANT: ICD-10-CM

## 2019-11-11 RX ORDER — MYCOPHENOLATE MOFETIL 250 MG/1
500 CAPSULE ORAL 2 TIMES DAILY
Qty: 120 CAPSULE | Refills: 3 | Status: SHIPPED | OUTPATIENT
Start: 2019-11-11 | End: 2020-03-09

## 2019-11-11 RX ORDER — MYCOPHENOLATE MOFETIL 250 MG/1
500 CAPSULE ORAL 2 TIMES DAILY
Qty: 60 CAPSULE | Refills: 3 | Status: SHIPPED | OUTPATIENT
Start: 2019-11-11 | End: 2019-11-11

## 2019-11-11 NOTE — TELEPHONE ENCOUNTER
Medication/Refill approved per CPA:    [facility]LL[facility]ATIVE AGREEMENT FOR  IMMUNOSUPPRESSENT PRESCRIPTION MODIFICATION.      Routing encounter to Transplant as an FYI.    Thanks,  Erika Sanz, PharmD  Specialty Pharmacist/Transplant  [facility]  [phone]

## 2019-11-21 DIAGNOSIS — E83.42 HYPOMAGNESEMIA: ICD-10-CM

## 2019-11-21 DIAGNOSIS — Z94.4 LIVER REPLACED BY TRANSPLANT (H): Primary | ICD-10-CM

## 2019-11-21 RX ORDER — MAGNESIUM OXIDE 400 MG/1
TABLET ORAL
Qty: 120 TABLET | Refills: 11 | Status: SHIPPED | OUTPATIENT
Start: 2019-11-21 | End: 2021-02-02

## 2019-11-25 DIAGNOSIS — F41.9 ANXIETY: ICD-10-CM

## 2019-11-26 RX ORDER — BUSPIRONE HYDROCHLORIDE 15 MG/1
TABLET ORAL
Qty: 180 TABLET | Refills: 1 | Status: SHIPPED | OUTPATIENT
Start: 2019-11-26 | End: 2020-05-21

## 2019-12-03 DIAGNOSIS — Z94.4 LIVER REPLACED BY TRANSPLANT (H): ICD-10-CM

## 2019-12-03 DIAGNOSIS — Z94.0 KIDNEY TRANSPLANTED: ICD-10-CM

## 2019-12-03 LAB
ALBUMIN SERPL-MCNC: 4 G/DL (ref 3.4–5)
ALP SERPL-CCNC: 76 U/L (ref 40–150)
ALT SERPL W P-5'-P-CCNC: 39 U/L (ref 0–70)
ANION GAP SERPL CALCULATED.3IONS-SCNC: 3 MMOL/L (ref 3–14)
AST SERPL W P-5'-P-CCNC: 22 U/L (ref 0–45)
BILIRUB DIRECT SERPL-MCNC: 0.1 MG/DL (ref 0–0.2)
BILIRUB SERPL-MCNC: 0.4 MG/DL (ref 0.2–1.3)
BUN SERPL-MCNC: 12 MG/DL (ref 7–30)
CALCIUM SERPL-MCNC: 9 MG/DL (ref 8.5–10.1)
CHLORIDE SERPL-SCNC: 108 MMOL/L (ref 94–109)
CO2 SERPL-SCNC: 28 MMOL/L (ref 20–32)
CREAT SERPL-MCNC: 1.56 MG/DL (ref 0.66–1.25)
ERYTHROCYTE [DISTWIDTH] IN BLOOD BY AUTOMATED COUNT: 13.4 % (ref 10–15)
GFR SERPL CREATININE-BSD FRML MDRD: 55 ML/MIN/{1.73_M2}
GLUCOSE SERPL-MCNC: 98 MG/DL (ref 70–99)
HCT VFR BLD AUTO: 45 % (ref 40–53)
HGB BLD-MCNC: 14.9 G/DL (ref 13.3–17.7)
MAGNESIUM SERPL-MCNC: 1.9 MG/DL (ref 1.6–2.3)
MCH RBC QN AUTO: 29 PG (ref 26.5–33)
MCHC RBC AUTO-ENTMCNC: 33.1 G/DL (ref 31.5–36.5)
MCV RBC AUTO: 88 FL (ref 78–100)
PLATELET # BLD AUTO: 143 10E9/L (ref 150–450)
POTASSIUM SERPL-SCNC: 4.3 MMOL/L (ref 3.4–5.3)
PROT SERPL-MCNC: 6.9 G/DL (ref 6.8–8.8)
RBC # BLD AUTO: 5.14 10E12/L (ref 4.4–5.9)
SODIUM SERPL-SCNC: 139 MMOL/L (ref 133–144)
TACROLIMUS BLD-MCNC: 4.7 UG/L (ref 5–15)
TME LAST DOSE: ABNORMAL H
WBC # BLD AUTO: 4.6 10E9/L (ref 4–11)

## 2019-12-03 PROCEDURE — 80197 ASSAY OF TACROLIMUS: CPT | Performed by: INTERNAL MEDICINE

## 2019-12-03 PROCEDURE — 80076 HEPATIC FUNCTION PANEL: CPT | Performed by: INTERNAL MEDICINE

## 2019-12-03 PROCEDURE — 85027 COMPLETE CBC AUTOMATED: CPT | Performed by: INTERNAL MEDICINE

## 2019-12-03 PROCEDURE — 36415 COLL VENOUS BLD VENIPUNCTURE: CPT | Performed by: INTERNAL MEDICINE

## 2019-12-03 PROCEDURE — 83735 ASSAY OF MAGNESIUM: CPT | Performed by: INTERNAL MEDICINE

## 2019-12-03 PROCEDURE — 80048 BASIC METABOLIC PNL TOTAL CA: CPT | Performed by: INTERNAL MEDICINE

## 2020-01-02 ENCOUNTER — TELEPHONE (OUTPATIENT)
Dept: GASTROENTEROLOGY | Facility: CLINIC | Age: 40
End: 2020-01-02

## 2020-01-06 ENCOUNTER — OFFICE VISIT (OUTPATIENT)
Dept: GASTROENTEROLOGY | Facility: CLINIC | Age: 40
End: 2020-01-06
Attending: INTERNAL MEDICINE
Payer: COMMERCIAL

## 2020-01-06 VITALS
DIASTOLIC BLOOD PRESSURE: 79 MMHG | HEIGHT: 70 IN | BODY MASS INDEX: 30.28 KG/M2 | HEART RATE: 74 BPM | RESPIRATION RATE: 18 BRPM | OXYGEN SATURATION: 99 % | TEMPERATURE: 98 F | SYSTOLIC BLOOD PRESSURE: 127 MMHG | WEIGHT: 211.5 LBS

## 2020-01-06 DIAGNOSIS — Z94.4 LIVER REPLACED BY TRANSPLANT (H): Primary | ICD-10-CM

## 2020-01-06 PROCEDURE — G0463 HOSPITAL OUTPT CLINIC VISIT: HCPCS | Mod: ZF

## 2020-01-06 ASSESSMENT — MIFFLIN-ST. JEOR: SCORE: 1880.61

## 2020-01-06 ASSESSMENT — PAIN SCALES - GENERAL: PAINLEVEL: NO PAIN (0)

## 2020-01-06 NOTE — LETTER
1/6/2020       RE: Cricket Chen  [address]  [place] [address]     Dear Colleague,    Thank you for referring your patient, Cricket Chen, to the [facility] HEPATOLOGY at [facility]. Please see a copy of my visit note below.    [facility]  LIVER TRANSPLANT CLINIC     A/P  39 year old male s/p LKT 5/2016 for ETOH.  Liver doing very well.  Post transplant course c/b PTLD and thyroid ca. Oncology monitoring.   IS per Dr. Sidhu. No changes to medications today.  Labs up to date.  Recommended against the supplement but ok to drink black coffee.    Will see back in 1 year     Laureen Galvan MD  Hepatology/ Liver Transplant  [facility]  ===================================================================  PCP: Dr. David Moser     SUBJECTIVE  39 year old male s/p DDLKT 5/2016 ETOH.  He is doing well. Had a kidney biopsy last fall.  Brought in a supplement powder that he wanted to know if he could take. He wants to stop drinking coffee because of expense. He drinks a couple cups of black coffee per day. The supplement powder lists that it has 99% caffeine (% of what is not clear) and says do not take if you have high BP or heart disease.     EXPLANT: No HCC  IS: Prograf 3-5 and MMF, pred 5 (per Dr. Sidhu)  LABS: Up to date, excellent liver function.    Lab Test 12/03/19  1113   PROTTOTAL 6.9   ALBUMIN 4.0   BILITOTAL 0.4   ALKPHOS 76   AST 22   ALT 39     REJECTION: None.  BILIARY ISSUES: None  STENT: Out  KIDNEY FUNCTION:   Creatinine   Date Value Ref Range Status   12/03/2019 1.56 (H) 0.66 - 1.25 mg/dL Final     BP: Good. Lisinopril, amlodipine  PREV:  Derm 10/2018. Flu shot done this year.  DISEASE RECURRENCE: Sober since prior to transplant  OTHER ISSUES:   PTLD in past. Recent imaging neg. Follows with oncology  Thyroid cancer, s/p thyroidectomy last surveillance July negative  Weight gain.   Had fistula out.     SOC: Has a girlfriend.  "Had good holidays.  ROS: 10 point ROS neg other than the symptoms noted above in the HPI.     OBJECTIVE  /79 (BP Location: Left arm, Patient Position: Sitting, Cuff Size: Adult Regular)   Pulse 74   Temp 98  F (36.7  C) (Oral)   Resp 18   Ht 1.778 m (5' 10\")   Wt 95.9 kg (211 lb 8 oz)   SpO2 99%   BMI 30.35 kg/m      GENERAL:  Very pleasant, well-appearing, in no acute distress.    HEENT:  No icterus, no oral lesions.    LYMPH:  No supraclavicular or cervical lymphadenopathy.    CARDIOVASCULAR:  Regular rate and rhythm.    CHEST:  Lungs are clear.    ABDOMEN:  Bowel sounds are present.  Abdomen is soft, nontender, nondistended.  Scar is well healed.      EXTREMITIES:  No edema.    SKIN:  No rash.  Multiple tattoos  NEUROLOGIC:  Speech is fluent and clear.  No asterixis or tremor.        Again, thank you for allowing me to participate in the care of your patient.      Sincerely,    Laureen Galvan MD      "

## 2020-01-06 NOTE — NURSING NOTE
"Chief Complaint   Patient presents with     RECHECK     S/P Liver TX 5/10/2016       Vital signs:  Temp: 98  F (36.7  C) Temp src: Oral BP: 127/79 Pulse: 74   Resp: 18 SpO2: 99 %     Height: 177.8 cm (5' 10\") Weight: 95.9 kg (211 lb 8 oz)  Estimated body mass index is 30.35 kg/m  as calculated from the following:    Height as of this encounter: 1.778 m (5' 10\").    Weight as of this encounter: 95.9 kg (211 lb 8 oz).        Diamond Walker, [facility]  1/6/2020 2:24 PM      "

## 2020-01-06 NOTE — PROGRESS NOTES
"[facility]  LIVER TRANSPLANT CLINIC     A/P  39 year old male s/p LKT 5/2016 for ETOH.  Liver doing very well.  Post transplant course c/b PTLD and thyroid ca. Oncology monitoring.   IS per Dr. Sidhu. No changes to medications today.  Labs up to date.  Recommended against the supplement but ok to drink black coffee.    Will see back in 1 year     Laureen Galvan MD  Hepatology/ Liver Transplant  [facility]  ===================================================================  PCP: Dr. David Moser     SUBJECTIVE  39 year old male s/p DDLKT 5/2016 ETOH.  He is doing well. Had a kidney biopsy last fall.  Brought in a supplement powder that he wanted to know if he could take. He wants to stop drinking coffee because of expense. He drinks a couple cups of black coffee per day. The supplement powder lists that it has 99% caffeine (% of what is not clear) and says do not take if you have high BP or heart disease.     EXPLANT: No HCC  IS: Prograf 3-5 and MMF, pred 5 (per Dr. Sidhu)  LABS: Up to date, excellent liver function.    Lab Test 12/03/19  1113   PROTTOTAL 6.9   ALBUMIN 4.0   BILITOTAL 0.4   ALKPHOS 76   AST 22   ALT 39     REJECTION: None.  BILIARY ISSUES: None  STENT: Out  KIDNEY FUNCTION:   Creatinine   Date Value Ref Range Status   12/03/2019 1.56 (H) 0.66 - 1.25 mg/dL Final     BP: Good. Lisinopril, amlodipine  PREV:  Derm 10/2018. Flu shot done this year.  DISEASE RECURRENCE: Sober since prior to transplant  OTHER ISSUES:   PTLD in past. Recent imaging neg. Follows with oncology  Thyroid cancer, s/p thyroidectomy last surveillance July negative  Weight gain.   Had fistula out.     SOC: Has a girlfriend. Had good holidays.  ROS: 10 point ROS neg other than the symptoms noted above in the HPI.     OBJECTIVE  /79 (BP Location: Left arm, Patient Position: Sitting, Cuff Size: Adult Regular)   Pulse 74   Temp 98  F (36.7  C) (Oral)   Resp 18   Ht 1.778 m (5' 10\")   Wt 95.9 kg " (211 lb 8 oz)   SpO2 99%   BMI 30.35 kg/m     GENERAL:  Very pleasant, well-appearing, in no acute distress.    HEENT:  No icterus, no oral lesions.    LYMPH:  No supraclavicular or cervical lymphadenopathy.    CARDIOVASCULAR:  Regular rate and rhythm.    CHEST:  Lungs are clear.    ABDOMEN:  Bowel sounds are present.  Abdomen is soft, nontender, nondistended.  Scar is well healed.      EXTREMITIES:  No edema.    SKIN:  No rash.  Multiple tattoos  NEUROLOGIC:  Speech is fluent and clear.  No asterixis or tremor.

## 2020-01-06 NOTE — LETTER
1/6/2020      RE: Cricket Chen  [address]  [place] [address]       [facility]  LIVER TRANSPLANT CLINIC     A/P  39 year old male s/p LKT 5/2016 for ETOH.  Liver doing very well.  Post transplant course c/b PTLD and thyroid ca. Oncology monitoring.   IS per Dr. Sidhu. No changes to medications today.  Labs up to date.  Recommended against the supplement but ok to drink black coffee.    Will see back in 1 year     Laureen Galvan MD  Hepatology/ Liver Transplant  [facility]  ===================================================================  PCP: Dr. David Moser     SUBJECTIVE  39 year old male s/p DDLKT 5/2016 ETOH.  He is doing well. Had a kidney biopsy last fall.  Brought in a supplement powder that he wanted to know if he could take. He wants to stop drinking coffee because of expense. He drinks a couple cups of black coffee per day. The supplement powder lists that it has 99% caffeine (% of what is not clear) and says do not take if you have high BP or heart disease.     EXPLANT: No HCC  IS: Prograf 3-5 and MMF, pred 5 (per Dr. Sidhu)  LABS: Up to date, excellent liver function.    Lab Test 12/03/19  1113   PROTTOTAL 6.9   ALBUMIN 4.0   BILITOTAL 0.4   ALKPHOS 76   AST 22   ALT 39     REJECTION: None.  BILIARY ISSUES: None  STENT: Out  KIDNEY FUNCTION:   Creatinine   Date Value Ref Range Status   12/03/2019 1.56 (H) 0.66 - 1.25 mg/dL Final     BP: Good. Lisinopril, amlodipine  PREV:  Derm 10/2018. Flu shot done this year.  DISEASE RECURRENCE: Sober since prior to transplant  OTHER ISSUES:   PTLD in past. Recent imaging neg. Follows with oncology  Thyroid cancer, s/p thyroidectomy last surveillance July negative  Weight gain.   Had fistula out.     SOC: Has a girlfriend. Had good holidays.  ROS: 10 point ROS neg other than the symptoms noted above in the HPI.     OBJECTIVE  /79 (BP Location: Left arm, Patient Position: Sitting, Cuff Size: Adult Regular)    "Pulse 74   Temp 98  F (36.7  C) (Oral)   Resp 18   Ht 1.778 m (5' 10\")   Wt 95.9 kg (211 lb 8 oz)   SpO2 99%   BMI 30.35 kg/m      GENERAL:  Very pleasant, well-appearing, in no acute distress.    HEENT:  No icterus, no oral lesions.    LYMPH:  No supraclavicular or cervical lymphadenopathy.    CARDIOVASCULAR:  Regular rate and rhythm.    CHEST:  Lungs are clear.    ABDOMEN:  Bowel sounds are present.  Abdomen is soft, nontender, nondistended.  Scar is well healed.      EXTREMITIES:  No edema.    SKIN:  No rash.  Multiple tattoos  NEUROLOGIC:  Speech is fluent and clear.  No asterixis or tremor.        Laureen Galvan MD      "

## 2020-01-07 DIAGNOSIS — D47.Z1 PTLD (POST-TRANSPLANT LYMPHOPROLIFERATIVE DISORDER) (H): ICD-10-CM

## 2020-01-07 DIAGNOSIS — Z94.4 LIVER REPLACED BY TRANSPLANT (H): ICD-10-CM

## 2020-01-07 DIAGNOSIS — Z94.0 KIDNEY TRANSPLANTED: ICD-10-CM

## 2020-01-07 LAB
ALBUMIN SERPL-MCNC: 4.1 G/DL (ref 3.4–5)
ALP SERPL-CCNC: 81 U/L (ref 40–150)
ALT SERPL W P-5'-P-CCNC: 38 U/L (ref 0–70)
ANION GAP SERPL CALCULATED.3IONS-SCNC: 4 MMOL/L (ref 3–14)
AST SERPL W P-5'-P-CCNC: 23 U/L (ref 0–45)
BASOPHILS # BLD AUTO: 0 10E9/L (ref 0–0.2)
BASOPHILS NFR BLD AUTO: 0.2 %
BILIRUB DIRECT SERPL-MCNC: 0.1 MG/DL (ref 0–0.2)
BILIRUB SERPL-MCNC: 0.6 MG/DL (ref 0.2–1.3)
BUN SERPL-MCNC: 17 MG/DL (ref 7–30)
CALCIUM SERPL-MCNC: 9.1 MG/DL (ref 8.5–10.1)
CHLORIDE SERPL-SCNC: 106 MMOL/L (ref 94–109)
CO2 SERPL-SCNC: 28 MMOL/L (ref 20–32)
CREAT SERPL-MCNC: 1.55 MG/DL (ref 0.66–1.25)
DIFFERENTIAL METHOD BLD: ABNORMAL
EOSINOPHIL # BLD AUTO: 0.1 10E9/L (ref 0–0.7)
EOSINOPHIL NFR BLD AUTO: 1 %
ERYTHROCYTE [DISTWIDTH] IN BLOOD BY AUTOMATED COUNT: 13.1 % (ref 10–15)
GFR SERPL CREATININE-BSD FRML MDRD: 55 ML/MIN/{1.73_M2}
GLUCOSE SERPL-MCNC: 93 MG/DL (ref 70–99)
HCT VFR BLD AUTO: 45.4 % (ref 40–53)
HGB BLD-MCNC: 15.2 G/DL (ref 13.3–17.7)
LDH SERPL L TO P-CCNC: 138 U/L (ref 85–227)
LYMPHOCYTES # BLD AUTO: 0.9 10E9/L (ref 0.8–5.3)
LYMPHOCYTES NFR BLD AUTO: 17.6 %
MCH RBC QN AUTO: 28.7 PG (ref 26.5–33)
MCHC RBC AUTO-ENTMCNC: 33.5 G/DL (ref 31.5–36.5)
MCV RBC AUTO: 86 FL (ref 78–100)
MONOCYTES # BLD AUTO: 0.5 10E9/L (ref 0–1.3)
MONOCYTES NFR BLD AUTO: 9.8 %
NEUTROPHILS # BLD AUTO: 3.5 10E9/L (ref 1.6–8.3)
NEUTROPHILS NFR BLD AUTO: 71.4 %
PLATELET # BLD AUTO: 148 10E9/L (ref 150–450)
POTASSIUM SERPL-SCNC: 4.3 MMOL/L (ref 3.4–5.3)
PROT SERPL-MCNC: 7.1 G/DL (ref 6.8–8.8)
RBC # BLD AUTO: 5.3 10E12/L (ref 4.4–5.9)
SODIUM SERPL-SCNC: 138 MMOL/L (ref 133–144)
WBC # BLD AUTO: 4.9 10E9/L (ref 4–11)

## 2020-01-07 PROCEDURE — 87799 DETECT AGENT NOS DNA QUANT: CPT | Performed by: INTERNAL MEDICINE

## 2020-01-07 PROCEDURE — 82232 ASSAY OF BETA-2 PROTEIN: CPT | Performed by: INTERNAL MEDICINE

## 2020-01-07 PROCEDURE — 36415 COLL VENOUS BLD VENIPUNCTURE: CPT | Performed by: INTERNAL MEDICINE

## 2020-01-07 PROCEDURE — 85025 COMPLETE CBC W/AUTO DIFF WBC: CPT | Performed by: INTERNAL MEDICINE

## 2020-01-07 PROCEDURE — 83615 LACTATE (LD) (LDH) ENZYME: CPT | Performed by: INTERNAL MEDICINE

## 2020-01-07 PROCEDURE — 82248 BILIRUBIN DIRECT: CPT | Performed by: INTERNAL MEDICINE

## 2020-01-07 PROCEDURE — 80197 ASSAY OF TACROLIMUS: CPT | Performed by: INTERNAL MEDICINE

## 2020-01-07 PROCEDURE — 80053 COMPREHEN METABOLIC PANEL: CPT | Performed by: INTERNAL MEDICINE

## 2020-01-08 LAB
B2 MICROGLOB SERPL-MCNC: 2.3 MG/L
TACROLIMUS BLD-MCNC: 5.6 UG/L (ref 5–15)
TME LAST DOSE: NORMAL H

## 2020-01-09 DIAGNOSIS — L21.9 DERMATITIS, SEBORRHEIC: ICD-10-CM

## 2020-01-09 LAB
EBV DNA # SPEC NAA+PROBE: <500 {COPIES}/ML
EBV DNA SPEC NAA+PROBE-LOG#: <2.7 {LOG_COPIES}/ML

## 2020-01-09 RX ORDER — KETOCONAZOLE 20 MG/ML
SHAMPOO TOPICAL
Qty: 120 ML | Refills: 3 | OUTPATIENT
Start: 2020-01-09

## 2020-01-23 ENCOUNTER — OFFICE VISIT (OUTPATIENT)
Dept: FAMILY MEDICINE | Facility: CLINIC | Age: 40
End: 2020-01-23
Payer: COMMERCIAL

## 2020-01-23 VITALS
OXYGEN SATURATION: 100 % | SYSTOLIC BLOOD PRESSURE: 137 MMHG | BODY MASS INDEX: 30.12 KG/M2 | WEIGHT: 210.4 LBS | HEIGHT: 70 IN | DIASTOLIC BLOOD PRESSURE: 89 MMHG | HEART RATE: 64 BPM

## 2020-01-23 DIAGNOSIS — F41.9 ANXIETY: ICD-10-CM

## 2020-01-23 RX ORDER — HYDROXYZINE HYDROCHLORIDE 50 MG/1
50-100 TABLET, FILM COATED ORAL
Qty: 90 TABLET | Refills: 1 | Status: SHIPPED | OUTPATIENT
Start: 2020-01-23 | End: 2020-04-23

## 2020-01-23 ASSESSMENT — ANXIETY QUESTIONNAIRES
GAD7 TOTAL SCORE: 4
4. TROUBLE RELAXING: SEVERAL DAYS
7. FEELING AFRAID AS IF SOMETHING AWFUL MIGHT HAPPEN: NOT AT ALL
1. FEELING NERVOUS, ANXIOUS, OR ON EDGE: NOT AT ALL
6. BECOMING EASILY ANNOYED OR IRRITABLE: SEVERAL DAYS
7. FEELING AFRAID AS IF SOMETHING AWFUL MIGHT HAPPEN: NOT AT ALL
7. FEELING AFRAID AS IF SOMETHING AWFUL MIGHT HAPPEN: NOT AT ALL
6. BECOMING EASILY ANNOYED OR IRRITABLE: SEVERAL DAYS
5. BEING SO RESTLESS THAT IT IS HARD TO SIT STILL: NOT AT ALL
5. BEING SO RESTLESS THAT IT IS HARD TO SIT STILL: NOT AT ALL
3. WORRYING TOO MUCH ABOUT DIFFERENT THINGS: SEVERAL DAYS
2. NOT BEING ABLE TO STOP OR CONTROL WORRYING: SEVERAL DAYS
1. FEELING NERVOUS, ANXIOUS, OR ON EDGE: NOT AT ALL
3. WORRYING TOO MUCH ABOUT DIFFERENT THINGS: SEVERAL DAYS
IF YOU CHECKED OFF ANY PROBLEMS ON THIS QUESTIONNAIRE, HOW DIFFICULT HAVE THESE PROBLEMS MADE IT FOR YOU TO DO YOUR WORK, TAKE CARE OF THINGS AT HOME, OR GET ALONG WITH OTHER PEOPLE: NOT DIFFICULT AT ALL
GAD7 TOTAL SCORE: 4

## 2020-01-23 ASSESSMENT — MIFFLIN-ST. JEOR: SCORE: 1875.62

## 2020-01-23 ASSESSMENT — PATIENT HEALTH QUESTIONNAIRE - PHQ9: 5. POOR APPETITE OR OVEREATING: SEVERAL DAYS

## 2020-01-23 ASSESSMENT — PAIN SCALES - GENERAL: PAINLEVEL: NO PAIN (0)

## 2020-01-23 NOTE — PROGRESS NOTES
Subjective     Cricket Chen is a 39 year old male who presents to clinic today for the following health issues:    HPI   Patient is a former patient of Margot Miner.  He is here  For a refill of his busapar 5mg twice a day and atarax as needed for sleep.  He states both of his are working well. He states he has been in a long-term relationship for about 9 months and has had some anxiety surrounding this relationship.  He also has not had his atarax to take as needed for sleep and states he has not been getting good night sleeps.  He declines suicidal thoughts or ideations. He is open to individual counseling.  He does have a history of renal and liver transplant in 2016.  He is followed by the solid organ transplant team.  History of alcohol abuse with his last drink in 2014.  Patient declines any other questions or concerns.  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  Afib s/p ablation in 2000 and recent cardioversion.    Patient Active Problem List   Diagnosis     Atrial flutter (H)     Complete transposition of great vessels     Esophageal varices (H)     Insomnia     Alcoholic hepatitis     History of alcohol abuse     History of transposition of great vessels     Depression     Thrombocytopenia (H)     Pain medication agreement     [facility]     Liver replaced by transplant (H)     Kidney replaced by transplant     Immunosuppressed status (H)     Hypomagnesemia     Secondary renal hyperparathyroidism (H)     Chronic pain syndrome     Pain management contract signed     Aftercare following organ transplant     Post-transplant lymphoproliferative disorder (H)     Papillary thyroid carcinoma (H)     Advance directive discussed with patient     Acute alcoholic liver disease     Alcohol dependence (H)     Encounter for palliative care      Hypertension secondary to other renal disorders     Atrial fibrillation (H)     Postoperative hypothyroidism     Arm mass, right     Dermatitis, seborrheic     Chest pain     Shortness of breath     Other ill-defined heart diseases     Past Surgical History:   Procedure Laterality Date     ANESTHESIA CARDIOVERSION N/A 3/7/2018    Procedure: ANESTHESIA CARDIOVERSION;  Cardioversion;  Surgeon: GENERIC ANESTHESIA PROVIDER;  Location: [facility] OR     BENCH LIVER N/A 5/10/2016    Procedure: BENCH LIVER;  Surgeon: Ricky Deshpande MD;  [facility]: [facility]     BIOPSY LYMPH NODE CERVICAL Right 1/26/2017    Procedure: BIOPSY LYMPH NODE CERVICAL;  Surgeon: Beka Soto MD;  [facility]     CARDIAC SURGERY  9-10-80     CREATE FISTULA ARTERIOVENOUS UPPER EXTREMITY Right 9/16/2014    Procedure: CREATE FISTULA ARTERIOVENOUS UPPER EXTREMITY;  Surgeon: Padmaja Eaton MD;  Location:  OR     CV RIGHT HEART CATH N/A 4/19/2019    Procedure: CV RIGHT HEART CATH;  Surgeon: Sabi Wiggins MD;  Location:  HEART CARDIAC CATH LAB     CYSTOSCOPY, REMOVE STENT(S), COMBINED Right 6/22/2016    Procedure: COMBINED CYSTOSCOPY, REMOVE STENT(S);  Surgeon: Ricky Deshpande MD;  [facility]     EMBOLECTOMY UPPER EXTREMITY Right 8/17/2018    Procedure: EMBOLECTOMY UPPER EXTREMITY;  Repair Right Upper Arm Pseudo Aneursym ;  Surgeon: John Payton MD;  Location:  OR     ENT SURGERY       ESOPHAGOSCOPY, GASTROSCOPY, DUODENOSCOPY (EGD), COMBINED  5/30/2014    Procedure: COMBINED ESOPHAGOSCOPY, GASTROSCOPY, DUODENOSCOPY (EGD);  Surgeon: Guillaume Bautista MD;  Location:  GI     ESOPHAGOSCOPY, GASTROSCOPY, DUODENOSCOPY (EGD), COMBINED  9/30/14     ESOPHAGOSCOPY, GASTROSCOPY, DUODENOSCOPY (EGD), COMBINED Left 3/12/2015    Procedure: COMBINED ESOPHAGOSCOPY, GASTROSCOPY, DUODENOSCOPY (EGD);  Surgeon: Laureen Galvan MD;  Location:  GI     ESOPHAGOSCOPY, GASTROSCOPY, DUODENOSCOPY (EGD), COMBINED N/A 4/21/2016     Procedure: COMBINED ESOPHAGOSCOPY, GASTROSCOPY, DUODENOSCOPY (EGD);  Surgeon: Laureen Galvan MD;  [facility]: [facility]     ESOPHAGOSCOPY, GASTROSCOPY, DUODENOSCOPY (EGD), COMBINED N/A 2016    Procedure: COMBINED ESOPHAGOSCOPY, GASTROSCOPY, DUODENOSCOPY (EGD);  Surgeon: Laureen Galvan MD;  Location: [facility] GI     HC OR CATH ABLATION NON-CARDIAC ENDOVASCULAR      SVT     INSERT PORT VASCULAR ACCESS N/A 4/3/2017    Procedure: INSERT PORT VASCULAR ACCESS;  Surgeon: Rajendra Jacques PA-C;  [facility]     IR PORT REMOVAL LEFT  2019     LIGATE FISTULA ARTERIOVENOUS UPPER EXTREMITY Right 2017    Procedure: LIGATE FISTULA ARTERIOVENOUS UPPER EXTREMITY;  Revision of Right Arm Arteriovenous Fistula ;  Surgeon: Padmaja Eaton MD;  Location: [facility]     PERCUTANEOUS BIOPSY KIDNEY Right 2018    Procedure: PERCUTANEOUS BIOPSY KIDNEY;  Right Kidney Biopsy;  Surgeon: Yuval Khan MD;  [facility]     PERCUTANEOUS BIOPSY KIDNEY Right 10/30/2019    Procedure: Right Side Kidney Biopsy;  Surgeon: Jake Sidhu MD;  [facility]     PICC INSERTION  14    PICC line placement 14; Removal 2014     REMOVE PORT VASCULAR ACCESS Right 2019    Procedure: Right chest Port Removal;  Surgeon: Erasmo Ziegler PA-C;  Location:  OR     THORACIC SURGERY  1980    Transposition great arteries, repaired at 8 months     THYROIDECTOMY Right 2017    Procedure: THYROIDECTOMY;  Bilateral Total Thyroidectomy ;  Surgeon: Beka Soto MD;  [facility]     TRANSPLANT KIDNEY RECIPIENT  DONOR  5/10/2016    Procedure: TRANSPLANT KIDNEY RECIPIENT  DONOR;  Surgeon: Ricky Deshpande MD;  [facility]     TRANSPLANT LIVER RECIPIENT  DONOR N/A 5/10/2016    Procedure: TRANSPLANT LIVER RECIPIENT  DONOR;  Surgeon: Ricky Deshpande MD;  [facility]       Social History     Tobacco Use     Smoking status: Former  Smoker     Packs/day: 0.33     Years: 3.00     Pack years: 0.99     Types: Cigarettes     Start date: 1997     Last attempt to quit: 2000     Years since quittin.3     Smokeless tobacco: Former User     Tobacco comment: Quit chewing in early    Substance Use Topics     Alcohol use: No     Alcohol/week: 0.0 standard drinks     Comment: ~10 drinks per day for ten years, quit in 2014     Family History   Problem Relation Age of Onset     No Known Problems Brother      Arthritis Father      Hypertension Father      Hypertension Mother      Anxiety Disorder Mother      Hypertension Sister      No Known Problems Maternal Grandmother      No Known Problems Maternal Grandfather      No Known Problems Paternal Grandmother      No Known Problems Sister      No Known Problems Paternal Grandfather      Alcohol/Drug No family hx of      Gastrointestinal Disease No family hx of         no fam hx of liver disease or liver cancer         Current Outpatient Medications   Medication Sig Dispense Refill     hydrOXYzine (ATARAX) 50 MG tablet Take 1-2 tablets ( mg) by mouth nightly as needed for anxiety 90 tablet 1     apixaban ANTICOAGULANT (ELIQUIS ANTICOAGULANT) 5 MG tablet Take 1 tablet (5 mg) by mouth 2 times daily 180 tablet 2     busPIRone (BUSPAR) 15 MG tablet Take one tablet (15 mg) twice daily for anxiety.Call clinic to schedule follow up appointment. 180 tablet 1     desonide (DESOWEN) 0.05 % ointment Apply topically 2 times daily 15 g 3     ketoconazole (NIZORAL) 2 % cream Twice daily to areas of rash on face 60 g 1     ketoconazole (NIZORAL) 2 % shampoo Use as a foaming face wash in shower daily 120 mL 3     levothyroxine (SYNTHROID/LEVOTHROID) 137 MCG tablet Take 1 tablet by mouth Monday - Saturday  and 1.5 tablets on  96 tablet 3     lisinopril (PRINIVIL/ZESTRIL) 10 MG tablet Take 10 mg by mouth daily        magnesium oxide (MAG-OX) 400 MG tablet TAKE ONE TABLET BY MOUTH TWICE A   tablet 11     metoprolol succinate ER (TOPROL-XL) 25 MG 24 hr tablet Take 1 tablet (25 mg) by mouth daily 90 tablet 3     mycophenolate (GENERIC EQUIVALENT) 250 MG capsule Take 2 capsules (500 mg) by mouth 2 times daily 120 capsule 3     sulfamethoxazole-trimethoprim (BACTRIM/SEPTRA) 400-80 MG tablet Take 1 tablet by mouth daily 30 tablet 11     tacrolimus (GENERIC EQUIVALENT) 0.5 MG capsule Take 1 capsule (0.5 mg) by mouth every 12 hours *total dose 1.5 mg every 12 hours 60 capsule 11     tacrolimus (GENERIC EQUIVALENT) 1 MG capsule Take 1 capsule (1 mg) by mouth every 12 hours *total dose 1.5 mg every 12 hours 60 capsule 11     Allergies   Allergen Reactions     Hydromorphone Itching     Recent Labs   Lab Test 01/07/20  1122 12/03/19  1113 10/30/19  0634  05/10/19  1628  10/16/18  1119  09/04/18  1133  05/12/16  0336  10/14/14  0708   A1C  --   --   --   --   --   --   --   --   --   --  5.1  --   --    LDL  --   --   --   --   --   --   --   --  158*  --   --   --  120   HDL  --   --   --   --   --   --   --   --  40  --   --   --  55   TRIG  --   --   --   --   --   --   --   --  113  --   --   --  99   ALT 38 39 25   < >  --    < > 54  --   --    < > 476*   < > 20   CR 1.55* 1.56* 1.62*   < > 1.49*   < > 1.42*   < > 1.30*   < > 3.11*   < > 5.24*   GFRESTIMATED 55* 55* 53*   < > 58*   < > 56*   < > 62   < > 23*   < > 13*   GFRESTBLACK 64 64 61   < > 68   < > 67   < > 75   < > 28*   < > 15*   POTASSIUM 4.3 4.3 4.0   < > 4.2   < > 5.1   < > 4.4   < > 4.0   < > 2.8*   TSH  --   --   --   --  0.50  --  1.16  --   --    < >  --   --  3.95    < > = values in this interval not displayed.      BP Readings from Last 3 Encounters:   01/23/20 137/89   01/06/20 127/79   10/30/19 115/69    Wt Readings from Last 3 Encounters:   01/23/20 95.4 kg (210 lb 6.4 oz)   01/06/20 95.9 kg (211 lb 8 oz)   10/30/19 90.7 kg (200 lb)              Reviewed and updated as needed this visit by Provider         Review of Systems  "  CONSTITUTIONAL: NEGATIVE for fever, chills, change in weight  RESP: NEGATIVE for significant cough or SOB  CV: NEGATIVE for chest pain  PSYCHIATRIC:  POSITIVE - see HPI        Objective    /89   Pulse 64   Ht 1.778 m (5' 10\")   Wt 95.4 kg (210 lb 6.4 oz)   SpO2 100%   BMI 30.19 kg/m    Body mass index is 30.19 kg/m .  Physical Exam   GENERAL: healthy, alert and no distress  RESP: lungs clear to auscultation - no rales, rhonchi or wheezes  CV: regular rate and rhythm, normal S1 S2, no S3 or S4, no murmur, click or rub, no peripheral edema and peripheral pulses strong  PSYCH: mentation appears normal, affect normal/bright    Diagnostic Test Results:  Labs reviewed in Epic  none         Assessment & Plan     (F41.9) Anxiety  Comment:   Plan: hydrOXYzine (ATARAX) 50 MG tablet, MENTAL         HEALTH REFERRAL  - Adult; Outpatient Treatment;        Individual/Couples/Family/Group Therapy/Health         Psychology; [facility] [place]         [phone]; We will contact you to schedule        the appointment or please call with any         questions       BMI:   Estimated body mass index is 30.19 kg/m  as calculated from the following:    Height as of this encounter: 1.778 m (5' 10\").    Weight as of this encounter: 95.4 kg (210 lb 6.4 oz).       See Patient Instructions  Continue buspar.  New atarax prescription given  Referral to individual counseling given.  Could considering adding on a serotonin specific reuptake inhibitor to patients regimen.  Patient has been on paxil before.  Patient will consider.   Patient without suicidal thoughts or ideations.  Follow-up in 6 months, sooner if needed.    Return to clinic if no improvement or symptoms worsen.  Patient verbalized understanding & agreed with plan of care.    DARYN Allen, CNP  [facility]        Answers for HPI/ROS submitted by the patient on 1/23/2020   DEBBIE 7 TOTAL SCORE: 4    "

## 2020-01-23 NOTE — PATIENT INSTRUCTIONS

## 2020-01-23 NOTE — NURSING NOTE
"39 year old  Chief Complaint   Patient presents with     Establish Care     Pt is here to establish care.     Refill Request     Medication refill.        Blood pressure 137/89, pulse 64, height 1.778 m (5' 10\"), weight 95.4 kg (210 lb 6.4 oz), SpO2 100 %. Body mass index is 30.19 kg/m .  BP completed using cuff size:      Chapis Leavitt, A  January 23, 2020 12:29 PM  "

## 2020-01-24 ASSESSMENT — ANXIETY QUESTIONNAIRES: GAD7 TOTAL SCORE: 4

## 2020-01-28 DIAGNOSIS — Z94.0 S/P KIDNEY TRANSPLANT: ICD-10-CM

## 2020-01-28 DIAGNOSIS — Z94.4 LIVER REPLACED BY TRANSPLANT (H): ICD-10-CM

## 2020-01-28 DIAGNOSIS — I48.92 ATRIAL FLUTTER, UNSPECIFIED TYPE (H): ICD-10-CM

## 2020-01-28 RX ORDER — SULFAMETHOXAZOLE AND TRIMETHOPRIM 400; 80 MG/1; MG/1
1 TABLET ORAL DAILY
Qty: 30 TABLET | Refills: 11 | Status: SHIPPED | OUTPATIENT
Start: 2020-01-28 | End: 2021-02-04

## 2020-01-29 RX ORDER — METOPROLOL SUCCINATE 25 MG/1
25 TABLET, EXTENDED RELEASE ORAL DAILY
Qty: 90 TABLET | Refills: 3 | Status: SHIPPED | OUTPATIENT
Start: 2020-01-29 | End: 2020-08-21

## 2020-02-04 DIAGNOSIS — Z94.4 LIVER REPLACED BY TRANSPLANT (H): ICD-10-CM

## 2020-02-04 DIAGNOSIS — Z94.0 KIDNEY TRANSPLANTED: ICD-10-CM

## 2020-02-04 LAB
ALBUMIN SERPL-MCNC: 3.9 G/DL (ref 3.4–5)
ALP SERPL-CCNC: 86 U/L (ref 40–150)
ALT SERPL W P-5'-P-CCNC: 58 U/L (ref 0–70)
ANION GAP SERPL CALCULATED.3IONS-SCNC: 3 MMOL/L (ref 3–14)
AST SERPL W P-5'-P-CCNC: 26 U/L (ref 0–45)
BILIRUB DIRECT SERPL-MCNC: <0.1 MG/DL (ref 0–0.2)
BILIRUB SERPL-MCNC: 0.3 MG/DL (ref 0.2–1.3)
BUN SERPL-MCNC: 20 MG/DL (ref 7–30)
CALCIUM SERPL-MCNC: 9.2 MG/DL (ref 8.5–10.1)
CHLORIDE SERPL-SCNC: 108 MMOL/L (ref 94–109)
CO2 SERPL-SCNC: 27 MMOL/L (ref 20–32)
CREAT SERPL-MCNC: 1.56 MG/DL (ref 0.66–1.25)
ERYTHROCYTE [DISTWIDTH] IN BLOOD BY AUTOMATED COUNT: 13.6 % (ref 10–15)
GFR SERPL CREATININE-BSD FRML MDRD: 55 ML/MIN/{1.73_M2}
GLUCOSE SERPL-MCNC: 109 MG/DL (ref 70–99)
HCT VFR BLD AUTO: 45.9 % (ref 40–53)
HGB BLD-MCNC: 15.3 G/DL (ref 13.3–17.7)
MAGNESIUM SERPL-MCNC: 1.9 MG/DL (ref 1.6–2.3)
MCH RBC QN AUTO: 29 PG (ref 26.5–33)
MCHC RBC AUTO-ENTMCNC: 33.3 G/DL (ref 31.5–36.5)
MCV RBC AUTO: 87 FL (ref 78–100)
PLATELET # BLD AUTO: 153 10E9/L (ref 150–450)
POTASSIUM SERPL-SCNC: 4.7 MMOL/L (ref 3.4–5.3)
PROT SERPL-MCNC: 7 G/DL (ref 6.8–8.8)
RBC # BLD AUTO: 5.27 10E12/L (ref 4.4–5.9)
SODIUM SERPL-SCNC: 138 MMOL/L (ref 133–144)
WBC # BLD AUTO: 5.1 10E9/L (ref 4–11)

## 2020-02-04 PROCEDURE — 80197 ASSAY OF TACROLIMUS: CPT | Performed by: INTERNAL MEDICINE

## 2020-02-04 PROCEDURE — 80076 HEPATIC FUNCTION PANEL: CPT | Performed by: INTERNAL MEDICINE

## 2020-02-04 PROCEDURE — 80048 BASIC METABOLIC PNL TOTAL CA: CPT | Performed by: INTERNAL MEDICINE

## 2020-02-04 PROCEDURE — 83735 ASSAY OF MAGNESIUM: CPT | Performed by: INTERNAL MEDICINE

## 2020-02-04 PROCEDURE — 36415 COLL VENOUS BLD VENIPUNCTURE: CPT | Performed by: INTERNAL MEDICINE

## 2020-02-04 PROCEDURE — 85027 COMPLETE CBC AUTOMATED: CPT | Performed by: INTERNAL MEDICINE

## 2020-02-05 LAB
TACROLIMUS BLD-MCNC: 5.8 UG/L (ref 5–15)
TME LAST DOSE: NORMAL H

## 2020-02-06 ENCOUNTER — OFFICE VISIT (OUTPATIENT)
Dept: TRANSPLANT | Facility: CLINIC | Age: 40
End: 2020-02-06
Attending: INTERNAL MEDICINE
Payer: COMMERCIAL

## 2020-02-06 ENCOUNTER — OFFICE VISIT (OUTPATIENT)
Dept: ENDOCRINOLOGY | Facility: CLINIC | Age: 40
End: 2020-02-06
Payer: COMMERCIAL

## 2020-02-06 VITALS
SYSTOLIC BLOOD PRESSURE: 135 MMHG | BODY MASS INDEX: 30.58 KG/M2 | OXYGEN SATURATION: 99 % | WEIGHT: 213.1 LBS | TEMPERATURE: 98.1 F | RESPIRATION RATE: 18 BRPM | DIASTOLIC BLOOD PRESSURE: 89 MMHG | HEART RATE: 66 BPM

## 2020-02-06 VITALS
BODY MASS INDEX: 30.49 KG/M2 | HEART RATE: 66 BPM | HEIGHT: 70 IN | SYSTOLIC BLOOD PRESSURE: 135 MMHG | WEIGHT: 213 LBS | DIASTOLIC BLOOD PRESSURE: 89 MMHG

## 2020-02-06 DIAGNOSIS — E89.0 POSTOPERATIVE HYPOTHYROIDISM: Primary | ICD-10-CM

## 2020-02-06 DIAGNOSIS — C73 PAPILLARY THYROID CARCINOMA (H): ICD-10-CM

## 2020-02-06 DIAGNOSIS — E89.0 POSTOPERATIVE HYPOTHYROIDISM: ICD-10-CM

## 2020-02-06 DIAGNOSIS — D47.Z1 POST-TRANSPLANT LYMPHOPROLIFERATIVE DISORDER (H): Primary | ICD-10-CM

## 2020-02-06 LAB
T4 FREE SERPL-MCNC: 1.2 NG/DL (ref 0.76–1.46)
TSH SERPL DL<=0.005 MIU/L-ACNC: 0.84 MU/L (ref 0.4–4)

## 2020-02-06 PROCEDURE — G0463 HOSPITAL OUTPT CLINIC VISIT: HCPCS | Mod: ZF

## 2020-02-06 ASSESSMENT — PAIN SCALES - GENERAL
PAINLEVEL: NO PAIN (0)
PAINLEVEL: NO PAIN (0)

## 2020-02-06 ASSESSMENT — MIFFLIN-ST. JEOR: SCORE: 1887.41

## 2020-02-06 NOTE — NURSING NOTE
"Oncology Rooming Note    February 6, 2020 9:33 AM   Cricket Chen is a 39 year old male who presents for:    Chief Complaint   Patient presents with     Oncology Clinic Visit     Patient with Post-transplant lymphoproliferative disorder here for provider visit      Initial Vitals: /89 (BP Location: Left arm, Patient Position: Chair, Cuff Size: Adult Large)   Pulse 66   Temp 98.1  F (36.7  C) (Oral)   Resp 18   Wt 96.7 kg (213 lb 1.6 oz)   SpO2 99%   BMI 30.58 kg/m   Estimated body mass index is 30.58 kg/m  as calculated from the following:    Height as of 1/23/20: 1.778 m (5' 10\").    Weight as of this encounter: 96.7 kg (213 lb 1.6 oz). Body surface area is 2.19 meters squared.  No Pain (0) Comment: Data Unavailable   No LMP for male patient.  Allergies reviewed: Yes  Medications reviewed: Yes    Medications: Medication refills not needed today.  Pharmacy name entered into [facility]:    [facility] - [place], [place] - [address] [facility] - [place] - [address] [facility] - [place], [place] - [address] [facility]    Clinical concerns:       Latanya Alcantara CMA              "

## 2020-02-06 NOTE — PATIENT INSTRUCTIONS
- lab today  - schedule ultrasound neck  - return in 1 year    If you have any questions, please do not hesitate to call clinic line at [phone] and ask for Endocrinology clinic.  If you need to fax, please fax to clinic fax number at [phone]    After clinic hours or weekends, please contact [phone] and ask for Endocrinologist-on call      Sincerely,    Zuleyma Gray MD  Endocrinology

## 2020-02-06 NOTE — PROGRESS NOTES
Endocrinology Note         Cricket is a 39 year old male presents today for follow up post total thyroidectomy    HPI  Cricket Chen is a 39 years old male with hx of post liver and kidney transplant in 5/2016, posttransplant lymphoproliferative disorder, atrial flutter status post direct current cardioversion, hypertension who is here for follow up post total thyroidectomy    I saw him first time in March 2017 for hypermetabolic focus in the right lobe of the thyroid with a max SUV of 12.7 on PET that was noted during baseline evaluation for diffuse large B cell lymphoma. Otherwise no suspicious FDG uptake within the head and neck. He also has PET-avid right axillary lymphadenopathy measuring 1.3 cm but no evidence of FDG-avid areas throughout the rest of his body.     At that time, US thyroid showed 1 cm mid/inferior solid right thyroid nodule which likely corresponds to the area of FDG avidity on prior PET CT. Subsequent FNA of that nodule showed positive for PTC.     He ultimately went for total thyroidectomy on 8/7/2017 by . His postoperative course was uncomplicated. Pathology showed PTC 0.8 cm in the right lobe with negative margin for carcinoma, 0.2 cm follicular adenoma was also identify in the right lobe. In the left lobe, 0.1 cm of PTC with negative margin and 1.2 cm benign colloid cyst were noted. No lymphovascular invasion was identified.    Interim history  Last seen May 2019. He has been doing well. He is taking levothyroxine 137  g a day,1 pill for 6 days and 1.5 pills x1 day per week. Lab in May 2019 showed TSH 0.5 and FT4 1.34, Tg 0.19, TgAb<0.4. He denied difficulty swallowing or breathing. He denied any muscle twitching, muscle cramping, hand numbness or tingling. He followed up with oncologist for PTLD which is currently in remission.     Past Medical History  Past Medical History:   Diagnosis Date     Alcohol abuse     Last drink in Mid-April 2014     Anemia in ESRD (end-stage  renal disease) (H)      Anxiety 2008     Atrial flutter (H) 2017     Cirrhosis (H)     S/P liver transplant     Depression      History of blood transfusion      History of transposition of great vessels     atrial switch at age 8 months old     Hypertension      Liver transplant recipient (H)     2016     Papillary thyroid carcinoma (H)      Pneumonia 11-15-14     Renal transplant recipient     2016     Varices, esophageal (H)        Allergies  Allergies   Allergen Reactions     Hydromorphone Itching     Medications  Current Outpatient Medications   Medication Sig Dispense Refill     apixaban ANTICOAGULANT (ELIQUIS ANTICOAGULANT) 5 MG tablet Take 1 tablet (5 mg) by mouth 2 times daily 180 tablet 2     busPIRone (BUSPAR) 15 MG tablet Take one tablet (15 mg) twice daily for anxiety.Call clinic to schedule follow up appointment. 180 tablet 1     desonide (DESOWEN) 0.05 % ointment Apply topically 2 times daily (Patient not taking: Reported on 2/6/2020) 15 g 3     hydrOXYzine (ATARAX) 50 MG tablet Take 1-2 tablets ( mg) by mouth nightly as needed for anxiety 90 tablet 1     ketoconazole (NIZORAL) 2 % cream Twice daily to areas of rash on face 60 g 1     ketoconazole (NIZORAL) 2 % shampoo Use as a foaming face wash in shower daily 120 mL 3     levothyroxine (SYNTHROID/LEVOTHROID) 137 MCG tablet Take 1 tablet by mouth Monday - Saturday  and 1.5 tablets on Sunday 96 tablet 3     lisinopril (PRINIVIL/ZESTRIL) 10 MG tablet Take 10 mg by mouth daily        magnesium oxide (MAG-OX) 400 MG tablet TAKE ONE TABLET BY MOUTH TWICE A  tablet 11     metoprolol succinate ER (TOPROL-XL) 25 MG 24 hr tablet Take 1 tablet (25 mg) by mouth daily 90 tablet 3     mycophenolate (GENERIC EQUIVALENT) 250 MG capsule Take 2 capsules (500 mg) by mouth 2 times daily 120 capsule 3     sulfamethoxazole-trimethoprim (BACTRIM/SEPTRA) 400-80 MG tablet Take 1 tablet by mouth daily 30 tablet 11     tacrolimus (GENERIC EQUIVALENT) 0.5 MG  capsule Take 1 capsule (0.5 mg) by mouth every 12 hours *total dose 1.5 mg every 12 hours 60 capsule 11     tacrolimus (GENERIC EQUIVALENT) 1 MG capsule Take 1 capsule (1 mg) by mouth every 12 hours *total dose 1.5 mg every 12 hours 60 capsule 11     Family History  family history includes Anxiety Disorder in his mother; Arthritis in his father; Hypertension in his father, mother, and sister; No Known Problems in his brother, maternal grandfather, maternal grandmother, paternal grandfather, paternal grandmother, and sister.   No family hx of thyroid cancer    Social History  Social History     Tobacco Use     Smoking status: Former Smoker     Packs/day: 0.33     Years: 3.00     Pack years: 0.99     Types: Cigarettes     Start date: 1997     Last attempt to quit: 2000     Years since quittin.3     Smokeless tobacco: Former User     Tobacco comment: Quit chewing in early    Substance Use Topics     Alcohol use: No     Alcohol/week: 0.0 standard drinks     Comment: ~10 drinks per day for ten years, quit in 2014     Drug use: No     quit drinking  Non , no children  He is currently working as a  teacher    ROS  Constitutional: gained about 5-7 lbs from the last visit, good energy, no night sweat  Eyes: no vision change, diplopia or red eyes   Neck: no difficulty swallowing, no choking, no neck pain, no neck swelling  Cardiovascular: no chest pain, palpitations  Respiratory: no dyspnea, cough, shortness of breath or wheezing   GI: no nausea, vomiting, diarrhea or constipation, no abdominal pain   : no change in urine, no dysuria or hematuria  Musculoskeletal: no joint or muscle pain or swelling   Integumentary: no concerning lesions  Neuro: no loss of strength or sensation, no numbness or tingling, no tremor, no dizziness, no headache   Endo: no polyuria or polydipsia, no temperature intolerance   Heme/Lymph: no concerning bumps, no bleeding problems   Allergy: no environmental  "allergies   Psych: no depression or anxiety     Physical Exam  /89   Pulse 66   Ht 1.778 m (5' 10\")   Wt 96.6 kg (213 lb)   BMI 30.56 kg/m    Body mass index is 30.56 kg/m .  Constitutional: no distress, comfortable, pleasant   Eyes: anicteric, normal extra-ocular movements, no lid lag or retraction  Neck: well healed surgical scar at the upper part of the right neck and lower neck, no discrete nodule  Cardiovascular: regular rate and rhythm, normal S1 and S2, no murmurs  Respiratory: clear to auscultation, no wheezes or crackles, normal breath sounds   Gastrointestinal:  Surgical scar along right and left costal area, nontender, no hepatomegaly, no masses   Musculoskeletal: no edema   Skin: no jaundice   Neurological: cranial nerves intact, 2+ reflexes at patella , normal gait, no tremor on outstretched hands bilaterally  Psychological: appropriate mood   Lymphatic: no cervical  lymphadenopathy.    RESULTS  PET 2/4/2017  HEAD/NECK:  Hypermetabolic focus in the right lobe of the thyroid with a max SUV of 12.7. Otherwise no suspicious FDG uptake within the head and neck. There is a 3.0 x 2.2 cm postoperative collection in the right lateral  neck anterior to the sternocleidal mastoid muscle, lateral to the right submandibular gland.   No abnormality identified within the mucosal spaces of the neck. Tongue base is normal. No lymphadenopathy within the neck. Limited head CT is within normal limits.    IMPRESSION:   1. Hypermetabolic right axillary lymph node, suspicious for involvement by lymphoproliferative disease.  2. Hypermetabolic lesion in the right thyroid with a max SUV of 12.7. No definite CT correlate identified. Ultrasound of the thyroid is recommended.  3. Indeterminant 2.2 cm area of decreased enhancement within the right lower quadrant transplant kidney. It does not appear to be  masslike. Ultrasound is recommended for further characterization   4. Postsurgical changes of liver and right lower " quadrant kidney transplantation.  5. Transposition of great vessels with postsurgical changes of atrial baffle procedure resulting in a large, presumably intentional atrial septal defect.     US thyroid 3/2/2017  Thyroid parenchyma: Homogeneous  The right lobe of the thyroid measures: 1.6 x 1.6 x 4.5 cm   The thyroid isthmus measures: 0.2 cm   The left lobe of the thyroid measures: 1.6 x 1.1 x 4 cm      Right lobe:  Nodule 1:  Nodule measurement: 0.3 x 0.3 x 0.3 cm  Echogenicity: Predominantly isoechoic  Consistency: Mixed cystic and solid  Calcifications: no  Hypervascular: Minimal peripheral vascularity  Interval growth (>20%): No prior imaging available     Nodule 2:  Nodule measurement: 0.9 x 0.8 x 0.8 cm  Echogenicity: Hypoechoic  Consistency: solid  Calcifications: no  Hypervascular: yes  Interval growth (>20%): No prior imaging available     Isthmus: No nodule     Left Lobe:   Nodule 1:  Nodule measurement: 0.7 x 0.5 x 0.8 cm  Echogenicity: Hypoechoic  Consistency: cystic  Calcifications: no; nondependent echogenic focus with ringdown  artifact most compatible with inspissated colloid.  Hypervascular: no  Interval growth (>20%): No prior imaging available     Impression:  1.  Almost 1 cm mid/inferior solid right thyroid nodule which likely corresponds to the area of FDG avidity on prior PET CT. Consider FNA for further evaluation.  2.  Additional subcentimeter right thyroid nodule and left thyroid colloid cyst are noted    FNA right thyroid nodule 3/16/2017  Thyroid, right #2, ultrasound-guided fine needle aspiration:   Positive for malignancy.   Papillary thyroid carcinoma   Specimen Adequacy: Satisfactory for evaluation.    Surgical pathology 8/7/17  FINAL DIAGNOSIS:   A- Thyroid, right, lobectomy:   - Papillary thyroid microcarcinoma: 0.8 cm in greatest dimension   - Margins negative for carcinoma   - Perineural and vascular invasion not identified.   - Follicular adenoma, 0.2 cm in greatest dimension   -  See tumor synopsis for details     B- Thyroid, left, lobectomy:   - Papillary thyroid microcarcinoma: 0.1 cm in greatest dimension   - Margins negative for carcinoma   - Perineural and vascular invasion not identified.   - Benign colloid cyst: 1.2 cm in greatest dimension   - See tumor synopsis for details     Report Name: Thyroid Gland - Resection   Status: Submitted     Part(s) Involved:   A: Thyroid, right     Synoptic Report:     SPECIMEN     Procedure:         - Total thyroidectomy     TUMOR     Histologic Type:         - Papillary carcinoma       Common Significant Variants:           - Classical (usual, conventional)     Tumor Size: 0.8 cm     Tumor Laterality:         - Right lobe         - Left lobe     Tumor Focality:         - Multifocal     Tumor Extent       Extrathyroidal Extension:           - Not identified     Accessory Tumor Findings       Angioinvasion (vascular invasion):           - Not identified       Lymphatic Invasion:           - Not identified       Perineural Invasion:           - Not identified     MARGINS     Margins:         - Margins uninvolved by carcinoma     LYMPH NODES     Regional Lymph Nodes:         - No nodes submitted or found     STAGE (PTNM)     TNM Descriptors:         - m (multiple primary tumors)     Primary Tumor (pT):         - pT1a:  Tumor 1 cm or less in greatest dimension limited to the   thyroid.     Regional Lymph Nodes (pN):         - pNX: Regional lymph nodes cannot be assessed     ADDITIONAL FINDINGS     Additional Pathologic Findings:         - Adenoma     US head/neck 7/3/2017  COMPARISON: CT neck 3/28/2017, ultrasound thyroid 3/2/2017.     Lymph nodes are measured bilaterally with measurements given in transverse, AP, and length (craniocaudal) dimensions as follows:     Right:  Level 2: No lymph nodes identified.     Level 3:   1: 7 x 2 mm lymph node.     Level 4:  1: 7 x 5 x 11 mm lymph node.  2: 8 x 4 x 9 mm lymph node.     Level 5: No lymph nodes  identified.  Level 6: No lymph nodes identified.  Level 7: No lymph nodes identified.     Left:  Level 2:   1: 9 x 5 x 6 mm lymph node.  2: 6 x 4 x 10 mm lymph node.     Level 3: No lymph nodes identified.  Level 4: No lymph nodes identified.  Level 5: No lymph nodes identified.  Level 6: No lymph nodes identified.  Level 7: No lymph nodes identified.     Thyroid: Thyroid nodules partially visualized, including the 8 mm nodule in the inferior right lobe, and an 8 mm hypoechoic left inferior nodule.      IMPRESSION:  1.  Lymph nodes as described without malignant features.  2.  Partially visualized thyroid nodules, not significantly changed from 3/16/2017.      9/27/2017: TSH 0.13, FT4 1.17, Tg 0.23, TgAb<0.4  2/9/2018: TSH 0.08, free T4 1.52, Tg 0.19, TgAb<0.4  3/6/2018: TSH 0.12, free T4 1.87  4/3/2018: TSH 0.39, free T4 1.36  6/12/2018: TSH 0.94, free T4 1.08  8/10/2018: TSH 2.3, FT4 1.29, Tg 0.33, TgAb <0.4  10/16/2018: TSH 1.16, FT4 1.16  1/17/2019: FT4 1.33  5/10/2019: TSH 0.5, FT4 1.34, Tg 0.19, TgAb<0.4    US neck 8/10/2018  Lymph nodes are measured bilaterally with measurements given in transverse, AP, and craniocaudal dimensions as follows:     Right:  Level 1: No lymphadenopathy  Level 2: No lymphadenopathy  Level 3: 8 x 3 x 10 mm lymph node with a preserved fatty hilum  Level 4: There are 3 lymph nodes with preserved fatty hilum measuring 1.1 x 0.6 x 1.2 cm, 0.8 x 0.3 x 0.9 cm, and 0.8 x 0.4 x 0.8 cm  Level 5: No lymphadenopathy  Level 6: No lymphadenopathy     Left:  Level 1: No lymphadenopathy  Level 2: There are 2 lymph nodes without definite fatty rom which measure 1.1 x 0.4 x 1.0 cm and 0.6 x 0.5 x 0.9 cm  Level 3: No lymphadenopathy  Level 4: 0.7 x 0.3 x 2.1 cm lymph node with a preserved fatty hilum  Level 5: No lymphadenopathy  Level 6: No lymphadenopathy                                                                   IMPRESSION: Soft tissue neck ultrasound with lymph node measurements as  described above. No pathologically enlarged or morphologically suspicious lymph nodes. There are two left level 2 lymph nodes which do not have a definite fatty hilum, although they are similar in size and appearance from 7/3/2017 and were not hypermetabolic on PET/CT 7/19/2017.    US neck 5/13/2019  Lymph nodes are measured bilaterally with measurements given in craniocaudal, transverse and AP dimensions as follows:     Right:  Level 1: No lymphadenopathy  Level 2: No lymphadenopathy  Level 3: No lymphadenopathy  Level 4: 1.4 x 0.6 x 2.2 cm lymph node with a fatty hilum (previously 0.9 x 0.3 x 1.0 cm), 1.2 x 0.6 x 1.2 cm lymph node with a fatty hilum  (previously 0.8 x 0.4 x 0.9 cm), and a 1.0 x 0.6 x 0.8 cm lymph node with a fatty hilum  Level 5: No lymphadenopathy  Level 6: No lymphadenopathy  Level 7: No lymphadenopathy     Left:  Level 1: No lymphadenopathy  Level 2: 1.3 x 0.8 x 1.5 cm lymph node with a small fatty hilum, and a 6 x 5 x 11 mm lymph node with a small fatty hilum  Level 3: No lymphadenopathy  Level 4: 3 x 7 x 12 mm lymph node with a small fatty hilum  Level 5: No lymphadenopathy  Level 6: No lymphadenopathy  Level 7: No lymphadenopathy                                                                      IMPRESSION: Soft tissue neck ultrasound with lymph node measurements as described above. Most notably, two of the right level 4 lymph nodes have enlarged from 8/10/2018.     ASSESSMENT:    Cricket Chen is a 39 years old male with hx of post liver and kidney transplant in 5/2016, posttransplant lymphoproliferative disorder, atrial flutter status post direct current cardioversion, hypertension who is here for follow up post total thyroidectomy    1) multifocal micro PTC: he was noted to have thyroid cancer during evaluation of diffuse large B cell lymphoma. He was initially noted for hypermetabolic activity at right thyroid gland with SUV max of 12.7 from PET scan. Subsequent FNA of right thyroid  nodule showed positive for thyroid cancer. He underwent total thyroidectomy without complications.  His prognosis is favorable given micro PTC, negative margin and no lymphovascular invasion.  Thyroglobulin in 8/2018 was 0.33 with negative thyroglobulin antibody.  I will monitor thyroglobulin for now.  We will plan to check lab, TSH, FT4, Tg, TgAb, today  We will also check ultrasound neck    2) postoperative hypothyroidism: clinically euthyroid.  Currently on levothyroxine 137 mcg, 1 pill x 6 days and 1.5 pills x1 day per week. Will check lab today    PLAN:   - lab for TSH, FT4, Tg, TgAb, ultrasound head and neck  - RTC 1 year     Zuleyma Gray MD     Division of Diabetes and Endocrinology  Department of Medicine  [phone]

## 2020-02-06 NOTE — LETTER
2/6/2020       RE: Cricket Chen  [address]  [facility] [address]     Dear Colleague,    Thank you for referring your patient, Cricket Chen, to the [facility] ENDOCRINOLOGY at [facility]. Please see a copy of my visit note below.         Endocrinology Note         Cricket is a 39 year old male presents today for follow up post total thyroidectomy    HPI  Cricket Chen is a 39 years old male with hx of post liver and kidney transplant in 5/2016, posttransplant lymphoproliferative disorder, atrial flutter status post direct current cardioversion, hypertension who is here for follow up post total thyroidectomy    I saw him first time in March 2017 for hypermetabolic focus in the right lobe of the thyroid with a max SUV of 12.7 on PET that was noted during baseline evaluation for diffuse large B cell lymphoma. Otherwise no suspicious FDG uptake within the head and neck. He also has PET-avid right axillary lymphadenopathy measuring 1.3 cm but no evidence of FDG-avid areas throughout the rest of his body.     At that time, US thyroid showed 1 cm mid/inferior solid right thyroid nodule which likely corresponds to the area of FDG avidity on prior PET CT. Subsequent FNA of that nodule showed positive for PTC.     He ultimately went for total thyroidectomy on 8/7/2017 by . His postoperative course was uncomplicated. Pathology showed PTC 0.8 cm in the right lobe with negative margin for carcinoma, 0.2 cm follicular adenoma was also identify in the right lobe. In the left lobe, 0.1 cm of PTC with negative margin and 1.2 cm benign colloid cyst were noted. No lymphovascular invasion was identified.    Interim history  Last seen May 2019. He has been doing well. He is taking levothyroxine 137  g a day,1 pill for 6 days and 1.5 pills x1 day per week. Lab in May 2019 showed TSH 0.5 and FT4 1.34, Tg 0.19, TgAb<0.4. He denied difficulty swallowing or breathing. He denied any  muscle twitching, muscle cramping, hand numbness or tingling. He followed up with oncologist for PTLD which is currently in remission.     Past Medical History  Past Medical History:   Diagnosis Date     Alcohol abuse     Last drink in Mid-April 2014     Anemia in ESRD (end-stage renal disease) (H)      Anxiety 2008     Atrial flutter (H) 2017     Cirrhosis (H)     S/P liver transplant     Depression      History of blood transfusion      History of transposition of great vessels     atrial switch at age 8 months old     Hypertension      Liver transplant recipient (H)     2016     Papillary thyroid carcinoma (H)      Pneumonia 11-15-14     Renal transplant recipient     2016     Varices, esophageal (H)        Allergies  Allergies   Allergen Reactions     Hydromorphone Itching     Medications  Current Outpatient Medications   Medication Sig Dispense Refill     apixaban ANTICOAGULANT (ELIQUIS ANTICOAGULANT) 5 MG tablet Take 1 tablet (5 mg) by mouth 2 times daily 180 tablet 2     busPIRone (BUSPAR) 15 MG tablet Take one tablet (15 mg) twice daily for anxiety.Call clinic to schedule follow up appointment. 180 tablet 1     desonide (DESOWEN) 0.05 % ointment Apply topically 2 times daily (Patient not taking: Reported on 2/6/2020) 15 g 3     hydrOXYzine (ATARAX) 50 MG tablet Take 1-2 tablets ( mg) by mouth nightly as needed for anxiety 90 tablet 1     ketoconazole (NIZORAL) 2 % cream Twice daily to areas of rash on face 60 g 1     ketoconazole (NIZORAL) 2 % shampoo Use as a foaming face wash in shower daily 120 mL 3     levothyroxine (SYNTHROID/LEVOTHROID) 137 MCG tablet Take 1 tablet by mouth Monday - Saturday  and 1.5 tablets on Sunday 96 tablet 3     lisinopril (PRINIVIL/ZESTRIL) 10 MG tablet Take 10 mg by mouth daily        magnesium oxide (MAG-OX) 400 MG tablet TAKE ONE TABLET BY MOUTH TWICE A  tablet 11     metoprolol succinate ER (TOPROL-XL) 25 MG 24 hr tablet Take 1 tablet (25 mg) by mouth daily 90  tablet 3     mycophenolate (GENERIC EQUIVALENT) 250 MG capsule Take 2 capsules (500 mg) by mouth 2 times daily 120 capsule 3     sulfamethoxazole-trimethoprim (BACTRIM/SEPTRA) 400-80 MG tablet Take 1 tablet by mouth daily 30 tablet 11     tacrolimus (GENERIC EQUIVALENT) 0.5 MG capsule Take 1 capsule (0.5 mg) by mouth every 12 hours *total dose 1.5 mg every 12 hours 60 capsule 11     tacrolimus (GENERIC EQUIVALENT) 1 MG capsule Take 1 capsule (1 mg) by mouth every 12 hours *total dose 1.5 mg every 12 hours 60 capsule 11     Family History  family history includes Anxiety Disorder in his mother; Arthritis in his father; Hypertension in his father, mother, and sister; No Known Problems in his brother, maternal grandfather, maternal grandmother, paternal grandfather, paternal grandmother, and sister.   No family hx of thyroid cancer    Social History  Social History     Tobacco Use     Smoking status: Former Smoker     Packs/day: 0.33     Years: 3.00     Pack years: 0.99     Types: Cigarettes     Start date: 1997     Last attempt to quit: 2000     Years since quittin.3     Smokeless tobacco: Former User     Tobacco comment: Quit chewing in early    Substance Use Topics     Alcohol use: No     Alcohol/week: 0.0 standard drinks     Comment: ~10 drinks per day for ten years, quit in 2014     Drug use: No     quit drinking  Non , no children  He is currently working as a  teacher    ROS  Constitutional: gained about 5-7 lbs from the last visit, good energy, no night sweat  Eyes: no vision change, diplopia or red eyes   Neck: no difficulty swallowing, no choking, no neck pain, no neck swelling  Cardiovascular: no chest pain, palpitations  Respiratory: no dyspnea, cough, shortness of breath or wheezing   GI: no nausea, vomiting, diarrhea or constipation, no abdominal pain   : no change in urine, no dysuria or hematuria  Musculoskeletal: no joint or muscle pain or swelling  "  Integumentary: no concerning lesions  Neuro: no loss of strength or sensation, no numbness or tingling, no tremor, no dizziness, no headache   Endo: no polyuria or polydipsia, no temperature intolerance   Heme/Lymph: no concerning bumps, no bleeding problems   Allergy: no environmental allergies   Psych: no depression or anxiety     Physical Exam  /89   Pulse 66   Ht 1.778 m (5' 10\")   Wt 96.6 kg (213 lb)   BMI 30.56 kg/m     Body mass index is 30.56 kg/m .  Constitutional: no distress, comfortable, pleasant   Eyes: anicteric, normal extra-ocular movements, no lid lag or retraction  Neck: well healed surgical scar at the upper part of the right neck and lower neck, no discrete nodule  Cardiovascular: regular rate and rhythm, normal S1 and S2, no murmurs  Respiratory: clear to auscultation, no wheezes or crackles, normal breath sounds   Gastrointestinal:  Surgical scar along right and left costal area, nontender, no hepatomegaly, no masses   Musculoskeletal: no edema   Skin: no jaundice   Neurological: cranial nerves intact, 2+ reflexes at patella , normal gait, no tremor on outstretched hands bilaterally  Psychological: appropriate mood   Lymphatic: no cervical  lymphadenopathy.    RESULTS  PET 2/4/2017  HEAD/NECK:  Hypermetabolic focus in the right lobe of the thyroid with a max SUV of 12.7. Otherwise no suspicious FDG uptake within the head and neck. There is a 3.0 x 2.2 cm postoperative collection in the right lateral  neck anterior to the sternocleidal mastoid muscle, lateral to the right submandibular gland.   No abnormality identified within the mucosal spaces of the neck. Tongue base is normal. No lymphadenopathy within the neck. Limited head CT is within normal limits.    IMPRESSION:   1. Hypermetabolic right axillary lymph node, suspicious for involvement by lymphoproliferative disease.  2. Hypermetabolic lesion in the right thyroid with a max SUV of 12.7. No definite CT correlate identified. " Ultrasound of the thyroid is recommended.  3. Indeterminant 2.2 cm area of decreased enhancement within the right lower quadrant transplant kidney. It does not appear to be  masslike. Ultrasound is recommended for further characterization   4. Postsurgical changes of liver and right lower quadrant kidney transplantation.  5. Transposition of great vessels with postsurgical changes of atrial baffle procedure resulting in a large, presumably intentional atrial septal defect.     US thyroid 3/2/2017  Thyroid parenchyma: Homogeneous  The right lobe of the thyroid measures: 1.6 x 1.6 x 4.5 cm   The thyroid isthmus measures: 0.2 cm   The left lobe of the thyroid measures: 1.6 x 1.1 x 4 cm      Right lobe:  Nodule 1:  Nodule measurement: 0.3 x 0.3 x 0.3 cm  Echogenicity: Predominantly isoechoic  Consistency: Mixed cystic and solid  Calcifications: no  Hypervascular: Minimal peripheral vascularity  Interval growth (>20%): No prior imaging available     Nodule 2:  Nodule measurement: 0.9 x 0.8 x 0.8 cm  Echogenicity: Hypoechoic  Consistency: solid  Calcifications: no  Hypervascular: yes  Interval growth (>20%): No prior imaging available     Isthmus: No nodule     Left Lobe:   Nodule 1:  Nodule measurement: 0.7 x 0.5 x 0.8 cm  Echogenicity: Hypoechoic  Consistency: cystic  Calcifications: no; nondependent echogenic focus with ringdown  artifact most compatible with inspissated colloid.  Hypervascular: no  Interval growth (>20%): No prior imaging available     Impression:  1.  Almost 1 cm mid/inferior solid right thyroid nodule which likely corresponds to the area of FDG avidity on prior PET CT. Consider FNA for further evaluation.  2.  Additional subcentimeter right thyroid nodule and left thyroid colloid cyst are noted    FNA right thyroid nodule 3/16/2017  Thyroid, right #2, ultrasound-guided fine needle aspiration:   Positive for malignancy.   Papillary thyroid carcinoma   Specimen Adequacy: Satisfactory for  evaluation.    Surgical pathology 8/7/17  FINAL DIAGNOSIS:   A- Thyroid, right, lobectomy:   - Papillary thyroid microcarcinoma: 0.8 cm in greatest dimension   - Margins negative for carcinoma   - Perineural and vascular invasion not identified.   - Follicular adenoma, 0.2 cm in greatest dimension   - See tumor synopsis for details     B- Thyroid, left, lobectomy:   - Papillary thyroid microcarcinoma: 0.1 cm in greatest dimension   - Margins negative for carcinoma   - Perineural and vascular invasion not identified.   - Benign colloid cyst: 1.2 cm in greatest dimension   - See tumor synopsis for details     Report Name: Thyroid Gland - Resection   Status: Submitted     Part(s) Involved:   A: Thyroid, right     Synoptic Report:     SPECIMEN     Procedure:         - Total thyroidectomy     TUMOR     Histologic Type:         - Papillary carcinoma       Common Significant Variants:           - Classical (usual, conventional)     Tumor Size: 0.8 cm     Tumor Laterality:         - Right lobe         - Left lobe     Tumor Focality:         - Multifocal     Tumor Extent       Extrathyroidal Extension:           - Not identified     Accessory Tumor Findings       Angioinvasion (vascular invasion):           - Not identified       Lymphatic Invasion:           - Not identified       Perineural Invasion:           - Not identified     MARGINS     Margins:         - Margins uninvolved by carcinoma     LYMPH NODES     Regional Lymph Nodes:         - No nodes submitted or found     STAGE (PTNM)     TNM Descriptors:         - m (multiple primary tumors)     Primary Tumor (pT):         - pT1a:  Tumor 1 cm or less in greatest dimension limited to the   thyroid.     Regional Lymph Nodes (pN):         - pNX: Regional lymph nodes cannot be assessed     ADDITIONAL FINDINGS     Additional Pathologic Findings:         - Adenoma     US head/neck 7/3/2017  COMPARISON: CT neck 3/28/2017, ultrasound thyroid 3/2/2017.     Lymph nodes are  measured bilaterally with measurements given in transverse, AP, and length (craniocaudal) dimensions as follows:     Right:  Level 2: No lymph nodes identified.     Level 3:   1: 7 x 2 mm lymph node.     Level 4:  1: 7 x 5 x 11 mm lymph node.  2: 8 x 4 x 9 mm lymph node.     Level 5: No lymph nodes identified.  Level 6: No lymph nodes identified.  Level 7: No lymph nodes identified.     Left:  Level 2:   1: 9 x 5 x 6 mm lymph node.  2: 6 x 4 x 10 mm lymph node.     Level 3: No lymph nodes identified.  Level 4: No lymph nodes identified.  Level 5: No lymph nodes identified.  Level 6: No lymph nodes identified.  Level 7: No lymph nodes identified.     Thyroid: Thyroid nodules partially visualized, including the 8 mm nodule in the inferior right lobe, and an 8 mm hypoechoic left inferior nodule.      IMPRESSION:  1.  Lymph nodes as described without malignant features.  2.  Partially visualized thyroid nodules, not significantly changed from 3/16/2017.      9/27/2017: TSH 0.13, FT4 1.17, Tg 0.23, TgAb<0.4  2/9/2018: TSH 0.08, free T4 1.52, Tg 0.19, TgAb<0.4  3/6/2018: TSH 0.12, free T4 1.87  4/3/2018: TSH 0.39, free T4 1.36  6/12/2018: TSH 0.94, free T4 1.08  8/10/2018: TSH 2.3, FT4 1.29, Tg 0.33, TgAb <0.4  10/16/2018: TSH 1.16, FT4 1.16  1/17/2019: FT4 1.33  5/10/2019: TSH 0.5, FT4 1.34, Tg 0.19, TgAb<0.4    US neck 8/10/2018  Lymph nodes are measured bilaterally with measurements given in transverse, AP, and craniocaudal dimensions as follows:     Right:  Level 1: No lymphadenopathy  Level 2: No lymphadenopathy  Level 3: 8 x 3 x 10 mm lymph node with a preserved fatty hilum  Level 4: There are 3 lymph nodes with preserved fatty hilum measuring 1.1 x 0.6 x 1.2 cm, 0.8 x 0.3 x 0.9 cm, and 0.8 x 0.4 x 0.8 cm  Level 5: No lymphadenopathy  Level 6: No lymphadenopathy     Left:  Level 1: No lymphadenopathy  Level 2: There are 2 lymph nodes without definite fatty rom which measure 1.1 x 0.4 x 1.0 cm and 0.6 x 0.5 x 0.9  cm  Level 3: No lymphadenopathy  Level 4: 0.7 x 0.3 x 2.1 cm lymph node with a preserved fatty hilum  Level 5: No lymphadenopathy  Level 6: No lymphadenopathy                                                                   IMPRESSION: Soft tissue neck ultrasound with lymph node measurements as described above. No pathologically enlarged or morphologically suspicious lymph nodes. There are two left level 2 lymph nodes which do not have a definite fatty hilum, although they are similar in size and appearance from 7/3/2017 and were not hypermetabolic on PET/CT 7/19/2017.    US neck 5/13/2019  Lymph nodes are measured bilaterally with measurements given in craniocaudal, transverse and AP dimensions as follows:     Right:  Level 1: No lymphadenopathy  Level 2: No lymphadenopathy  Level 3: No lymphadenopathy  Level 4: 1.4 x 0.6 x 2.2 cm lymph node with a fatty hilum (previously 0.9 x 0.3 x 1.0 cm), 1.2 x 0.6 x 1.2 cm lymph node with a fatty hilum  (previously 0.8 x 0.4 x 0.9 cm), and a 1.0 x 0.6 x 0.8 cm lymph node with a fatty hilum  Level 5: No lymphadenopathy  Level 6: No lymphadenopathy  Level 7: No lymphadenopathy     Left:  Level 1: No lymphadenopathy  Level 2: 1.3 x 0.8 x 1.5 cm lymph node with a small fatty hilum, and a 6 x 5 x 11 mm lymph node with a small fatty hilum  Level 3: No lymphadenopathy  Level 4: 3 x 7 x 12 mm lymph node with a small fatty hilum  Level 5: No lymphadenopathy  Level 6: No lymphadenopathy  Level 7: No lymphadenopathy                                                                      IMPRESSION: Soft tissue neck ultrasound with lymph node measurements as described above. Most notably, two of the right level 4 lymph nodes have enlarged from 8/10/2018.     ASSESSMENT:    Cricket Chen is a 39 years old male with hx of post liver and kidney transplant in 5/2016, posttransplant lymphoproliferative disorder, atrial flutter status post direct current cardioversion, hypertension who is here  for follow up post total thyroidectomy    1) multifocal micro PTC: he was noted to have thyroid cancer during evaluation of diffuse large B cell lymphoma. He was initially noted for hypermetabolic activity at right thyroid gland with SUV max of 12.7 from PET scan. Subsequent FNA of right thyroid nodule showed positive for thyroid cancer. He underwent total thyroidectomy without complications.  His prognosis is favorable given micro PTC, negative margin and no lymphovascular invasion.  Thyroglobulin in 8/2018 was 0.33 with negative thyroglobulin antibody.  I will monitor thyroglobulin for now.  We will plan to check lab, TSH, FT4, Tg, TgAb, today  We will also check ultrasound neck    2) postoperative hypothyroidism: clinically euthyroid.  Currently on levothyroxine 137 mcg, 1 pill x 6 days and 1.5 pills x1 day per week. Will check lab today    PLAN:   - lab for TSH, FT4, Tg, TgAb, ultrasound head and neck  - RTC 1 year     Zuleyma Gray MD     Division of Diabetes and Endocrinology  Department of Medicine  [phone]

## 2020-02-06 NOTE — PROGRESS NOTES
[facility] Clinic Visit  Feb 6, 2020      Disease and Transplant History:  1. 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   -- CellCept was reduced from 1500 b.i.d. to 250 twice a day, and  tacrolimus 3 mg twice a day was decreased to 2 mg twice a day.   2. He tolerated the RSST well and completed 4 weekly infusions of rituximab followed by R-COEP x4 (last cycle completed on 6/7/2017).   -- Post Treatment PET/CT scan and he had achieved CR1. Notably, his diagnostic PET/CT scan identified a thyroid nodule, which was consistent with papillary thyroid carcinoma based on the ultrasound-guided fine needle aspiration and he underwent thyroidectomy.    -- Repeat CT chest, abdomen and pelvis on 7/8/2019 did not show any evidence of active PTLD.  3. Observation      HPI: Isidro is doing well. No fevers or chills, no new lumps or bumps, no GI issues, no night sweats or weight loss. No abdominal pain, no bleeding or bruising. Feels good. No recent infections.    10 point ROS otherwise negative    Physical Exam:  /89 (BP Location: Left arm, Patient Position: Chair, Cuff Size: Adult Large)   Pulse 66   Temp 98.1  F (36.7  C) (Oral)   Resp 18   Wt 96.7 kg (213 lb 1.6 oz)   SpO2 99%   BMI 30.58 kg/m    Gen: Well appearing. KPS   HEENT: no palpable cervical or supraclavicular ISIDORO, OP clear with no thrush or exudate  Lungs: CTAB no crackles or wheezes  CV: RRR no r/m/g  Abd: soft, NTND  Ext: no edema       Labs:  Results for IJEOMA DOBSON (MRN [id number]) as of 2/6/2020 09:38   Ref. Range 2/4/2020 11:10   Sodium Latest Ref Range: 133 - 144 mmol/L 138   Potassium Latest Ref Range: 3.4 - 5.3 mmol/L 4.7   Chloride Latest Ref Range: 94 - 109 mmol/L 108   Carbon Dioxide Latest Ref Range: 20 - 32 mmol/L 27   Urea Nitrogen Latest Ref Range: 7 - 30 mg/dL 20   Creatinine Latest Ref  Range: 0.66 - 1.25 mg/dL 1.56 (H)   GFR Estimate Latest Ref Range: >60 mL/min/1.73_m2 55 (L)   GFR Estimate If Black Latest Ref Range: >60 mL/min/1.73_m2 64   Calcium Latest Ref Range: 8.5 - 10.1 mg/dL 9.2   Anion Gap Latest Ref Range: 3 - 14 mmol/L 3   Magnesium Latest Ref Range: 1.6 - 2.3 mg/dL 1.9   Albumin Latest Ref Range: 3.4 - 5.0 g/dL 3.9   Protein Total Latest Ref Range: 6.8 - 8.8 g/dL 7.0   Bilirubin Total Latest Ref Range: 0.2 - 1.3 mg/dL 0.3   Alkaline Phosphatase Latest Ref Range: 40 - 150 U/L 86   ALT Latest Ref Range: 0 - 70 U/L 58   AST Latest Ref Range: 0 - 45 U/L 26   Bilirubin Direct Latest Ref Range: 0.0 - 0.2 mg/dL <0.1   Glucose Latest Ref Range: 70 - 99 mg/dL 109 (H)   WBC Latest Ref Range: 4.0 - 11.0 10e9/L 5.1   Hemoglobin Latest Ref Range: 13.3 - 17.7 g/dL 15.3   Hematocrit Latest Ref Range: 40.0 - 53.0 % 45.9   Platelet Count Latest Ref Range: 150 - 450 10e9/L 153   RBC Count Latest Ref Range: 4.4 - 5.9 10e12/L 5.27   MCV Latest Ref Range: 78 - 100 fl 87   MCH Latest Ref Range: 26.5 - 33.0 pg 29.0   MCHC Latest Ref Range: 31.5 - 36.5 g/dL 33.3   RDW Latest Ref Range: 10.0 - 15.0 % 13.6       A/P: 38 yo man with history of PTLD dx in 2017 treated with Rituxan and then R-COEP X 4 with CR.    1. History of PTLD in ongoing remission 2.5 years out from chemo completion. Discussed that going forward we do clinical exams only and scan based on symptoms. No sign of recurrence on exam    2. Renal: creatinine stable in the mid 1.5 range  - stable immunosuppression with good Tac level    3.ID: no current infectious issues    4. Papillary Thyroid Cancer Status post resection: on synthroid. Follows with endocrine    Final Plan:  - RTC in 6 months with labs prior    Olena Crenshaw MD

## 2020-02-10 ENCOUNTER — HEALTH MAINTENANCE LETTER (OUTPATIENT)
Age: 40
End: 2020-02-10

## 2020-02-11 DIAGNOSIS — Z94.0 KIDNEY TRANSPLANTED: Primary | ICD-10-CM

## 2020-02-11 RX ORDER — LISINOPRIL 10 MG/1
10 TABLET ORAL DAILY
Qty: 90 TABLET | Refills: 1 | Status: SHIPPED | OUTPATIENT
Start: 2020-02-11 | End: 2020-08-03

## 2020-02-12 ENCOUNTER — ANCILLARY PROCEDURE (OUTPATIENT)
Dept: ULTRASOUND IMAGING | Facility: CLINIC | Age: 40
End: 2020-02-12
Attending: INTERNAL MEDICINE
Payer: COMMERCIAL

## 2020-02-12 DIAGNOSIS — C73 PAPILLARY THYROID CARCINOMA (H): ICD-10-CM

## 2020-02-17 DIAGNOSIS — C73 PAPILLARY THYROID CARCINOMA (H): ICD-10-CM

## 2020-02-17 DIAGNOSIS — E89.0 POSTOPERATIVE HYPOTHYROIDISM: ICD-10-CM

## 2020-02-17 LAB — LAB SCANNED RESULT: NORMAL

## 2020-02-17 RX ORDER — LEVOTHYROXINE SODIUM 137 UG/1
TABLET ORAL
Qty: 96 TABLET | Refills: 3 | Status: SHIPPED | OUTPATIENT
Start: 2020-02-17 | End: 2021-04-29

## 2020-02-20 NOTE — MR AVS SNAPSHOT
After Visit Summary   7/26/2017    Cricket Chen    MRN: [id number]           Patient Information     Date Of Birth          1980        Visit Information        Provider Department      7/26/2017 12:30 PM Rachel Peres MD PhD [facility]        Today's Diagnoses     Papillary thyroid carcinoma (H)    -  1    History of transposition of great vessels        Thrombocytopenia (H)        Post-transplant lymphoproliferative disorder (H)        Liver replaced by transplant (H)        Immunosuppressed status (H)        Benign essential hypertension          Care Instructions    Primary Care Center Medication Refill Request Information:  * Please contact your pharmacy regarding ANY request for medication refills.  ** [facility] Prescription Fax = [phone]  * Please allow 3 business days for routine medication refills.  * Please allow 5 business days for controlled substance medication refills.     Primary [facility] Test Result notification information:  *You will be notified with in 7-10 days of your appointment day regarding the results of your test.  If you are on MyChart you will be notified as soon as the provider has reviewed the results and signed off on them.    [facility] [phone]   GENERAL PREOP INSTRUCTIONS:  Proceed with surgery as planned.  No food or liquids the morning of surgery.  Call surgeon if develops respiratory illness, fever, or other illness.  Take the following medications the morning of surgery with a sip of water amylodipine and lisinopril   Letter sent to requesting surgeon listed above  Written preoperative instructions given.    Blood work today  We will try to get EKG from cardiology.            Follow-ups after your visit        Your next 10 appointments already scheduled     Aug 07, 2017   Procedure with Beka Soto MD   [facility], [place], Same Day Surgery (--)    [address] [place] [address] [phone]            Aug 16, 2017   8:40 AM CDT   (Arrive by 8:25 AM)   Return Visit with Beka Soto MD   [facility] Ear Nose and Throat ([facility])    [address]  4th Floor  [facility] [facility] [address]   [phone]            Aug 31, 2017 11:30 AM CDT   [facility] Draw with  [facility]   [facility] Draw ([facility])    [address]  2nd Floor  [facility] [facility] [address]   [phone]            Oct 26, 2017  3:00 PM CDT   [facility] Draw with  [facility] DRAW   [facility] Draw ([facility])    [address]  2nd Floor  [place] [facility] [address]   [phone]            Oct 26, 2017  3:30 PM CDT   RETURN ONC with Girish Narayanan MD   [facility] Blood and Marrow Transplant ([facility])    [address]  [address] [place] [facility] [address]   [phone]            Jan 16, 2018 12:10 PM CST   (Arrive by 11:55 AM)   Return Liver Transplant with Laureen Galvan MD   [facility] Hepatology ([facility])    [address]  [address]rd [place] [facility] [address] [phone]            Jan 16, 2018  2:20 PM CST   (Arrive by 1:50 PM)   Return Kidney Transplant with Jake Sidhu MD   [facility] Nephrology ([facility])    [address] [place] [address]  3rd [facility] [id number] [phone]              Future tests that were ordered for you today     Open Future Orders        Priority Expected Expires Ordered    CBC with platelets differential Routine 7/26/2017 8/9/2017 7/26/2017            Who to contact     Please call your clinic at [phone] to:    Ask questions about your health    Make or cancel appointments    Discuss your medicines    Learn about your test results    Speak to your doctor   If you have compliments or concerns about an experience at your clinic, or if you wish to file a complaint, please contact  [facility] Patient Relations at [phone] or email us at [email or web address]         Additional Information About Your Visit        sourceasyharrishi Information     sourceasyhart gives you secure access to your electronic health record. If you see a primary care provider, you can also send messages to your care team and make appointments. If you have questions, please call your primary care clinic.  If you do not have a primary care provider, please call [phone] and they will assist you.      Hiri is an electronic gateway that provides easy, online access to your medical records. With Hiri, you can request a clinic appointment, read your test results, renew a prescription or communicate with your care team.     To access your existing account, please contact your [facility] or call [phone] for assistance.        Care EveryWhere ID     This is your Care EveryWhere ID. This could be used by other organizations to access your [facility] medical records  [id number]        Your Vitals Were     Pulse Pulse Oximetry                71 100%           Blood Pressure from Last 3 Encounters:   07/26/17 119/77   07/20/17 120/71   06/10/17 114/72    Weight from Last 3 Encounters:   07/20/17 93.3 kg (205 lb 11.2 oz)   06/30/17 92.1 kg (203 lb)   06/07/17 92.8 kg (204 lb 8 oz)              We Performed the Following     Basic metabolic panel     EKG 12-LEAD CLINIC READ        Primary Care Provider    None Specified       No primary provider on file.        Equal Access to Services     CLAY VIDES : Hadii steffany Barroso, jaime brito, robina rojas. So [facility] [phone].    ATENCIÓN: Si habla español, tiene a laura disposición servicios gratuitos de asistencia lingüística. Llame al [phone].    We comply with applicable federal civil rights laws and [place] laws. We do not discriminate on  the basis of race, color, national origin, age, disability sex, sexual orientation or gender identity.            Thank you!     Thank you for choosing [facility]  for your care. Our goal is always to provide you with excellent care. Hearing back from our patients is one way we can continue to improve our services. Please take a few minutes to complete the written survey that you may receive in the mail after your visit with us. Thank you!             Your Updated Medication List - Protect others around you: Learn how to safely use, store and throw away your medicines at www.disposemymeds.org.          This list is accurate as of: 7/26/17  1:37 PM.  Always use your most recent med list.                   Brand Name Dispense Instructions for use Diagnosis    amLODIPine 10 MG tablet    NORVASC    90 tablet    Take 1 tablet (10 mg) by mouth daily    HTN (hypertension)       lisinopril 5 MG tablet    PRINIVIL/ZESTRIL    90 tablet    Take 1 tablet (5 mg) by mouth daily    HTN (hypertension)       magnesium oxide 400 (241.3 MG) MG tablet    MAG-OX    120 tablet    Take 1 tablet (400 mg) by mouth 2 times daily    Hypomagnesemia       predniSONE 5 MG tablet    DELTASONE    90 tablet    Take 1 tablet (5 mg) by mouth daily    Liver replaced by transplant (H), S/P kidney transplant       sulfamethoxazole-trimethoprim 400-80 MG per tablet    BACTRIM/SEPTRA    30 tablet    Take 1 tablet by mouth daily    Liver replaced by transplant (H), S/P kidney transplant       tacrolimus 1 MG capsule    PROGRAF - GENERIC EQUIVALENT    120 capsule    Take 2 capsules (2 mg) by mouth 2 times daily    Liver replaced by transplant (H), S/P kidney transplant       traMADol 50 MG tablet    ULTRAM    45 tablet    Take 1 tablet (50 mg) by mouth every 8 hours as needed for moderate pain    Chronic pain of left knee       traZODone 50 MG tablet    DESYREL    30 tablet    Take 0.5-2 tablets ( mg) by mouth At Bedtime    Other  insomnia       vitamin D 2000 UNITS tablet     100 tablet    Take 2,000 Units by mouth daily    Vitamin D deficiency          No

## 2020-03-03 ENCOUNTER — HOSPITAL ENCOUNTER (OUTPATIENT)
Dept: MRI IMAGING | Facility: CLINIC | Age: 40
End: 2020-03-03
Attending: INTERNAL MEDICINE
Payer: COMMERCIAL

## 2020-03-03 DIAGNOSIS — R93.1 ABNORMAL FINDINGS ON DIAGNOSTIC IMAGING OF HEART AND CORONARY CIRCULATION: ICD-10-CM

## 2020-03-03 DIAGNOSIS — Q20.3 COMPLETE TRANSPOSITION OF GREAT VESSELS: ICD-10-CM

## 2020-03-03 PROCEDURE — 40000556 ZZH STATISTIC PERIPHERAL IV START W US GUIDANCE

## 2020-03-03 PROCEDURE — 75561 CARDIAC MRI FOR MORPH W/DYE: CPT | Mod: 26 | Performed by: INTERNAL MEDICINE

## 2020-03-03 PROCEDURE — A9585 GADOBUTROL INJECTION: HCPCS | Performed by: INTERNAL MEDICINE

## 2020-03-03 PROCEDURE — 71555 MRI ANGIO CHEST W OR W/O DYE: CPT | Mod: 26 | Performed by: INTERNAL MEDICINE

## 2020-03-03 PROCEDURE — 71555 MRI ANGIO CHEST W OR W/O DYE: CPT

## 2020-03-03 PROCEDURE — 75565 CARD MRI VELOC FLOW MAPPING: CPT | Mod: 26 | Performed by: INTERNAL MEDICINE

## 2020-03-03 PROCEDURE — 25500064 ZZH RX 255 OP 636: Performed by: INTERNAL MEDICINE

## 2020-03-03 PROCEDURE — 75561 CARDIAC MRI FOR MORPH W/DYE: CPT

## 2020-03-03 RX ORDER — GADOBUTROL 604.72 MG/ML
10 INJECTION INTRAVENOUS ONCE
Status: COMPLETED | OUTPATIENT
Start: 2020-03-03 | End: 2020-03-03

## 2020-03-03 RX ADMIN — GADOBUTROL 10 ML: 604.72 INJECTION INTRAVENOUS at 15:30

## 2020-03-04 DIAGNOSIS — Z94.0 KIDNEY TRANSPLANTED: ICD-10-CM

## 2020-03-04 DIAGNOSIS — Z94.4 LIVER REPLACED BY TRANSPLANT (H): ICD-10-CM

## 2020-03-04 LAB
ALBUMIN SERPL-MCNC: 3.9 G/DL (ref 3.4–5)
ALP SERPL-CCNC: 87 U/L (ref 40–150)
ALT SERPL W P-5'-P-CCNC: 34 U/L (ref 0–70)
ANION GAP SERPL CALCULATED.3IONS-SCNC: 7 MMOL/L (ref 3–14)
AST SERPL W P-5'-P-CCNC: 22 U/L (ref 0–45)
BILIRUB DIRECT SERPL-MCNC: 0.1 MG/DL (ref 0–0.2)
BILIRUB SERPL-MCNC: 0.4 MG/DL (ref 0.2–1.3)
BUN SERPL-MCNC: 24 MG/DL (ref 7–30)
CALCIUM SERPL-MCNC: 9.1 MG/DL (ref 8.5–10.1)
CHLORIDE SERPL-SCNC: 106 MMOL/L (ref 94–109)
CO2 SERPL-SCNC: 26 MMOL/L (ref 20–32)
CREAT SERPL-MCNC: 1.62 MG/DL (ref 0.66–1.25)
ERYTHROCYTE [DISTWIDTH] IN BLOOD BY AUTOMATED COUNT: 13.3 % (ref 10–15)
GFR SERPL CREATININE-BSD FRML MDRD: 52 ML/MIN/{1.73_M2}
GLUCOSE SERPL-MCNC: 108 MG/DL (ref 70–99)
HCT VFR BLD AUTO: 44.4 % (ref 40–53)
HGB BLD-MCNC: 14.9 G/DL (ref 13.3–17.7)
MCH RBC QN AUTO: 29.1 PG (ref 26.5–33)
MCHC RBC AUTO-ENTMCNC: 33.6 G/DL (ref 31.5–36.5)
MCV RBC AUTO: 87 FL (ref 78–100)
PLATELET # BLD AUTO: 152 10E9/L (ref 150–450)
POTASSIUM SERPL-SCNC: 4.4 MMOL/L (ref 3.4–5.3)
PROT SERPL-MCNC: 7.1 G/DL (ref 6.8–8.8)
RBC # BLD AUTO: 5.12 10E12/L (ref 4.4–5.9)
SODIUM SERPL-SCNC: 139 MMOL/L (ref 133–144)
WBC # BLD AUTO: 5.5 10E9/L (ref 4–11)

## 2020-03-04 PROCEDURE — 85027 COMPLETE CBC AUTOMATED: CPT | Performed by: INTERNAL MEDICINE

## 2020-03-04 PROCEDURE — 36415 COLL VENOUS BLD VENIPUNCTURE: CPT | Performed by: INTERNAL MEDICINE

## 2020-03-04 PROCEDURE — 80197 ASSAY OF TACROLIMUS: CPT | Performed by: INTERNAL MEDICINE

## 2020-03-04 PROCEDURE — 80076 HEPATIC FUNCTION PANEL: CPT | Performed by: INTERNAL MEDICINE

## 2020-03-04 PROCEDURE — 80048 BASIC METABOLIC PNL TOTAL CA: CPT | Performed by: INTERNAL MEDICINE

## 2020-03-05 LAB
TACROLIMUS BLD-MCNC: 4.6 UG/L (ref 5–15)
TME LAST DOSE: ABNORMAL H

## 2020-03-06 ENCOUNTER — TELEPHONE (OUTPATIENT)
Dept: TRANSPLANT | Facility: CLINIC | Age: 40
End: 2020-03-06

## 2020-03-06 NOTE — TELEPHONE ENCOUNTER
Reviewed creatinine 1.6 with Dr Sidhu. Kidney biopsy in 10/2019 for elevated creatinine negative for rejection.     New creatinine baseline 1.4-1.7.

## 2020-03-09 DIAGNOSIS — Z94.0 KIDNEY REPLACED BY TRANSPLANT: ICD-10-CM

## 2020-03-09 DIAGNOSIS — Z94.0 KIDNEY TRANSPLANT RECIPIENT: Primary | ICD-10-CM

## 2020-03-09 DIAGNOSIS — Z79.60 LONG-TERM USE OF IMMUNOSUPPRESSANT MEDICATION: ICD-10-CM

## 2020-03-09 RX ORDER — MYCOPHENOLATE MOFETIL 250 MG/1
500 CAPSULE ORAL 2 TIMES DAILY
Qty: 120 CAPSULE | Refills: 11 | Status: SHIPPED | OUTPATIENT
Start: 2020-03-09 | End: 2021-03-04

## 2020-03-13 ENCOUNTER — ANCILLARY PROCEDURE (OUTPATIENT)
Dept: CARDIOLOGY | Facility: CLINIC | Age: 40
End: 2020-03-13
Attending: INTERNAL MEDICINE
Payer: COMMERCIAL

## 2020-03-13 VITALS
OXYGEN SATURATION: 99 % | DIASTOLIC BLOOD PRESSURE: 86 MMHG | BODY MASS INDEX: 31.07 KG/M2 | WEIGHT: 217 LBS | SYSTOLIC BLOOD PRESSURE: 130 MMHG | HEIGHT: 70 IN | HEART RATE: 71 BPM

## 2020-03-13 VITALS
OXYGEN SATURATION: 99 % | DIASTOLIC BLOOD PRESSURE: 86 MMHG | HEART RATE: 71 BPM | HEIGHT: 70 IN | SYSTOLIC BLOOD PRESSURE: 130 MMHG | BODY MASS INDEX: 31.07 KG/M2 | WEIGHT: 217 LBS

## 2020-03-13 DIAGNOSIS — I48.92 ATRIAL FLUTTER, UNSPECIFIED TYPE (H): Primary | ICD-10-CM

## 2020-03-13 DIAGNOSIS — Q20.3 D-TGA (DEXTRO-TRANSPOSITION OF GREAT ARTERIES): ICD-10-CM

## 2020-03-13 DIAGNOSIS — Q20.3 COMPLETE TRANSPOSITION OF GREAT VESSELS: ICD-10-CM

## 2020-03-13 DIAGNOSIS — I48.92 ATRIAL FLUTTER, UNSPECIFIED TYPE (H): ICD-10-CM

## 2020-03-13 PROCEDURE — 99214 OFFICE O/P EST MOD 30 MIN: CPT | Mod: ZP | Performed by: INTERNAL MEDICINE

## 2020-03-13 PROCEDURE — G0463 HOSPITAL OUTPT CLINIC VISIT: HCPCS | Mod: 25,ZF

## 2020-03-13 PROCEDURE — 93005 ELECTROCARDIOGRAM TRACING: CPT | Mod: ZF

## 2020-03-13 ASSESSMENT — ENCOUNTER SYMPTOMS
LIGHT-HEADEDNESS: 0
HYPERTENSION: 0
SLEEP DISTURBANCES DUE TO BREATHING: 0
ORTHOPNEA: 0
EXERCISE INTOLERANCE: 0
PALPITATIONS: 1
LEG PAIN: 0
SLEEP DISTURBANCES DUE TO BREATHING: 0
HYPERTENSION: 0
EXERCISE INTOLERANCE: 0
PALPITATIONS: 1
LEG PAIN: 0
SYNCOPE: 0
SYNCOPE: 0
ORTHOPNEA: 0
LIGHT-HEADEDNESS: 0
HYPOTENSION: 0
HYPOTENSION: 0

## 2020-03-13 ASSESSMENT — MIFFLIN-ST. JEOR
SCORE: 1905.56
SCORE: 1905.56

## 2020-03-13 ASSESSMENT — PAIN SCALES - GENERAL
PAINLEVEL: NO PAIN (0)
PAINLEVEL: NO PAIN (0)

## 2020-03-13 NOTE — PATIENT INSTRUCTIONS
You were seen today in the Adult Congenital and Cardiovascular Genetics Clinic at the [facility].    Cardiology Providers you saw during your visit:  Dr Francesca Alba and Dr Jaylen George    Diagnosis:  D-TGA    Results:  The results of your EKG were reviewed with you in clinic today    Recommendations:    1.  Continue to eat a heart healthy, low salt diet.  2.  Continue to get 20-30 minutes of aerobic activity, 4-5 days per week.  Examples of aerobic activity include walking, running, swimming, cycling, etc.  3.  Continue to observe good oral hygiene, with regular dental visits.  4.  We will place a 2 week Zio monitor today.     SBE prophylaxis:   Yes____  No____    Lifelong Bacterial Endocarditis Prophylaxis:  YES____  NO____    If YES is checked, follow the recommendations outlined below:   1. Take antibiotic(s) prior to recommended dental procedures and procedures on the respiratory tract or with infected skin, muscle or bones. SBE prophylaxis is not needed for routine GI and  procedures (ie. Colonoscopy or vaginal delivery)   2. Observe good oral hygiene daily, as advised by your dentist. Get regular professional dental care.   3. Keep cuts clean.   4. Infections should be treated promptly.   5. Symptoms of Infective Endocarditis could include: fever lasting more than 4-5 days or a recurrent fever that initially resolves but returns within 1-2 days)     Exercise restrictions:   Yes____  No____         If yes, list restrictions:  Must be allowed to rest if fatigued or SOB      Work restrictions:  Yes____  No____         If yes, list restrictions:    FASTING CHOLESTEROL was checked in the last 5 years YES___  NO___   Continue to eat a heart healthy, low salt diet.         ____ Fasting lipid panel order today         ____ No changes in medications          ____ I recommend the following changes in your cholesterol medications.:          ____ Please follow up for cholesterol screening at your primary  care physician      Follow-up:      For after hours urgent needs, call [phone] and ask to speak to the Adult Congenital Physician on call.  Mention Job Code 0401.    For emergencies call [phone].    For any scheduling needs, please call [facility] , at [phone]  Thank you for your visit today!  If you have questions or concerns about today's visit, please call me.    Gagandeep Corado RN, BSN  Cardiology Care Coordinator  [facility]  [phone]    [address] [place] [address]  Mail Code [id number]  [place] [address]

## 2020-03-13 NOTE — LETTER
3/13/2020      RE: Cricket Chen  [address]  [facility] [id number]     Dear Colleague,    Thank you for the opportunity to participate in the care of your patient, Cricket Chen, at the [facility] HEART [facility]. Please see a copy of my visit note below.    [facility] Electrophysiology Clinic Note  HPI:   Mr. Chen is a 39 year old male who has a past medical history significant for dTGA s/p modified Shoemaker operation and stitch closure of small VSD 1981, AFL s/p DCCV and s/p ablation 8/2000, liver and kidney transplant 2016, post transplant lymphoproliferative disorder, papillary thyroid cancer s/p thyroidectomy, prior ETOH abuse (sober since 2014), anxiety, and depression. He presents today for follow up.     He presented to [facility] on 3/6/18 with shortness of breath and chest flutterings that started the day prior. He recalled it felt similar to when he was in AFL in the past. In the [facility], he was found to be in AFL. He was subsequently admitted. He was started on Eliquis and arranged for NEELIMA/DCCV. He had successful DCCV 3/7/18 and he was discharged. He states that he had AFL in the past. He believes he had a DCCV around age 7. He had also been maintained on Sotalol. He also reports a previous ablation at an OSH in 8/2000 (records not available to us at this time). He did well without AFL issues until 3/2018 when he had recurrence. Most recent echo from 3/30/18 showed systemic EF 35-40%, mild TR, and no evidence of baffle leaks/obstructions. A CMRI from OSH from 3/2017 showed no baffle  Obstruction and systemic EF 33%. [facility]. He was doing well. He had not noted any further arrhythmias since his DCCV. He followed up in EP clinic in 7/2018 and was doing well without recurrent AFL.     EP Visit 3/2019: He presents today for follow up. He presented to [facility] on 1/16/19 reporting some shortness of breath and palpitations. He was in sinus  on presentation to ER. He was discharged on a zio patch monitor. Zio patch from 1/17/19-1/31/19 showed SR with rare ectopy and 7 NSVT up to 10 beats. 3 symptom activations showed sinus. An echo from 1/2019 showed moderately depressed Rv function, no obvious baffle obstructions or leaks. He reports feeling well and no recurrent palpitations. He denies any chest pain/pressures, dizziness, lightheadedness, worsening shortness of breath, leg/ankle swelling, PND, orthopnea, palpitations, or syncopal symptoms. Presenting 12 lead ECG shows SB Vent Rate 59 bpm,  ms,  ms, QTc 429 ms. Current cardiac medications include: Toprol XL, Eliquis, Lisinopril, and Norvasc.     He presents today for follow up. He reports feeling well. He denies any chest pain/pressures, dizziness, lightheadedness, worsening shortness of breath, leg/ankle swelling, PND, orthopnea, palpitations, or syncopal symptoms. Recent CMRI/MRA showed systemic EF 37%, normal baffles without obstruction, fatty metaplasia of sub-pulmonic ventricle and narrowing of intra-hepatic IVC which are unchanged. A zio patch from June 2019 showed SR with 7 NSVT episodes. Presenting 12 lead ECG shows NSR IRBBB Vent Rate 65 bpm,  ms,  ms, QTc 443 ms. Current cardiac medications include: Toprol XL, Eliquis, Lisinopril, and Norvasc.       PAST MEDICAL HISTORY:  Past Medical History:   Diagnosis Date     Alcohol abuse     Last drink in Mid-April 2014     Anemia in ESRD (end-stage renal disease) (H)      Anxiety 2008     Atrial flutter (H) 2017     Cirrhosis (H)     S/P liver transplant     Depression      History of blood transfusion      History of transposition of great vessels     atrial switch at age 8 months old     Hypertension      Liver transplant recipient (H)     2016     Papillary thyroid carcinoma (H)      Pneumonia 11-15-14     Renal transplant recipient     2016     Varices, esophageal (H)        CURRENT MEDICATIONS:  Current Outpatient  Medications   Medication Sig Dispense Refill     apixaban ANTICOAGULANT (ELIQUIS ANTICOAGULANT) 5 MG tablet Take 1 tablet (5 mg) by mouth 2 times daily 180 tablet 2     busPIRone (BUSPAR) 15 MG tablet Take one tablet (15 mg) twice daily for anxiety.Call clinic to schedule follow up appointment. 180 tablet 1     desonide (DESOWEN) 0.05 % ointment Apply topically 2 times daily (Patient not taking: Reported on 2/6/2020) 15 g 3     hydrOXYzine (ATARAX) 50 MG tablet Take 1-2 tablets ( mg) by mouth nightly as needed for anxiety 90 tablet 1     ketoconazole (NIZORAL) 2 % cream Twice daily to areas of rash on face 60 g 1     ketoconazole (NIZORAL) 2 % shampoo Use as a foaming face wash in shower daily 120 mL 3     levothyroxine (SYNTHROID/LEVOTHROID) 137 MCG tablet Take 1 tablet by mouth Monday - Saturday  and 1.5 tablets on Sunday 96 tablet 3     lisinopril (PRINIVIL/ZESTRIL) 10 MG tablet Take 1 tablet (10 mg) by mouth daily 90 tablet 1     magnesium oxide (MAG-OX) 400 MG tablet TAKE ONE TABLET BY MOUTH TWICE A  tablet 11     metoprolol succinate ER (TOPROL-XL) 25 MG 24 hr tablet Take 1 tablet (25 mg) by mouth daily 90 tablet 3     mycophenolate (GENERIC EQUIVALENT) 250 MG capsule Take 2 capsules (500 mg) by mouth 2 times daily 120 capsule 11     sulfamethoxazole-trimethoprim (BACTRIM/SEPTRA) 400-80 MG tablet Take 1 tablet by mouth daily 30 tablet 11     tacrolimus (GENERIC EQUIVALENT) 0.5 MG capsule Take 1 capsule (0.5 mg) by mouth every 12 hours *total dose 1.5 mg every 12 hours 60 capsule 11     tacrolimus (GENERIC EQUIVALENT) 1 MG capsule Take 1 capsule (1 mg) by mouth every 12 hours *total dose 1.5 mg every 12 hours 60 capsule 11     PAST SURGICAL HISTORY:  Past Surgical History:   Procedure Laterality Date     ANESTHESIA CARDIOVERSION N/A 3/7/2018    Procedure: ANESTHESIA CARDIOVERSION;  Cardioversion;  Surgeon: GENERIC ANESTHESIA PROVIDER;  Location:  OR     [facility] N/A 5/10/2016    Procedure:  BENCH LIVER;  Surgeon: Ricky Deshpande MD;  Location: [facility] OR     BIOPSY LYMPH NODE CERVICAL Right 1/26/2017    Procedure: BIOPSY LYMPH NODE CERVICAL;  Surgeon: Beka Soto MD;  Location: [facility] OR     CARDIAC SURGERY  9-10-80     CREATE FISTULA ARTERIOVENOUS UPPER EXTREMITY Right 9/16/2014    Procedure: CREATE FISTULA ARTERIOVENOUS UPPER EXTREMITY;  Surgeon: Padmaja Eaton MD;  Location: [facility] OR     CV RIGHT HEART CATH N/A 4/19/2019    Procedure: CV RIGHT HEART CATH;  Surgeon: Sabi Wiggins MD;  Location:  HEART CARDIAC CATH LAB     CYSTOSCOPY, REMOVE STENT(S), COMBINED Right 6/22/2016    Procedure: COMBINED CYSTOSCOPY, REMOVE STENT(S);  Surgeon: Ricky Deshpande MD;  Location: [facility] OR     EMBOLECTOMY UPPER EXTREMITY Right 8/17/2018    Procedure: EMBOLECTOMY UPPER EXTREMITY;  Repair Right Upper Arm Pseudo Aneursym ;  Surgeon: John Payton MD;  Location: [facility] OR     ENT SURGERY       ESOPHAGOSCOPY, GASTROSCOPY, DUODENOSCOPY (EGD), COMBINED  5/30/2014    Procedure: COMBINED ESOPHAGOSCOPY, GASTROSCOPY, DUODENOSCOPY (EGD);  Surgeon: Guillaume Bautista MD;  Location:  GI     ESOPHAGOSCOPY, GASTROSCOPY, DUODENOSCOPY (EGD), COMBINED  9/30/14     ESOPHAGOSCOPY, GASTROSCOPY, DUODENOSCOPY (EGD), COMBINED Left 3/12/2015    Procedure: COMBINED ESOPHAGOSCOPY, GASTROSCOPY, DUODENOSCOPY (EGD);  Surgeon: Laureen Galvan MD;  Location:  GI     ESOPHAGOSCOPY, GASTROSCOPY, DUODENOSCOPY (EGD), COMBINED N/A 4/21/2016    Procedure: COMBINED ESOPHAGOSCOPY, GASTROSCOPY, DUODENOSCOPY (EGD);  Surgeon: Laureen Galvan MD;  Location:  GI     ESOPHAGOSCOPY, GASTROSCOPY, DUODENOSCOPY (EGD), COMBINED N/A 7/21/2016    Procedure: COMBINED ESOPHAGOSCOPY, GASTROSCOPY, DUODENOSCOPY (EGD);  Surgeon: Laureen Galvan MD;  Location:  GI     [facility] OR CATH ABLATION NON-CARDIAC ENDOVASCULAR  1990,2002    SVT     INSERT PORT VASCULAR ACCESS N/A 4/3/2017    Procedure: INSERT PORT VASCULAR  ACCESS;  Surgeon: Rajendra Jacques PA-C;  [facility] OR     IR PORT REMOVAL LEFT  2019     LIGATE FISTULA ARTERIOVENOUS UPPER EXTREMITY Right 2017    Procedure: LIGATE FISTULA ARTERIOVENOUS UPPER EXTREMITY;  Revision of Right Arm Arteriovenous Fistula ;  Surgeon: Padmaja Eaton MD;  Location: [facility] OR     PERCUTANEOUS BIOPSY KIDNEY Right 2018    Procedure: PERCUTANEOUS BIOPSY KIDNEY;  Right Kidney Biopsy;  Surgeon: Yuval hKan MD;  [facility] OR     PERCUTANEOUS BIOPSY KIDNEY Right 10/30/2019    Procedure: Right Side Kidney Biopsy;  Surgeon: Jake Sidhu MD;  [facility] OR     PICC INSERTION  14    PICC line placement 14; Removal 2014     REMOVE PORT VASCULAR ACCESS Right 2019    Procedure: Right chest Port Removal;  Surgeon: Erasmo Ziegler PA-C;  Location: [facility] OR     THORACIC SURGERY  1980    Transposition great arteries, repaired at 8 months     THYROIDECTOMY Right 2017    Procedure: THYROIDECTOMY;  Bilateral Total Thyroidectomy ;  Surgeon: Beka Soto MD;  Location: [facility] OR     TRANSPLANT KIDNEY RECIPIENT  DONOR  5/10/2016    Procedure: TRANSPLANT KIDNEY RECIPIENT  DONOR;  Surgeon: Ricky Deshpande MD;  Location: [facility] OR     TRANSPLANT LIVER RECIPIENT  DONOR N/A 5/10/2016    Procedure: TRANSPLANT LIVER RECIPIENT  DONOR;  Surgeon: Ricky Deshpande MD;  Location: [facility] OR       ALLERGIES:     Allergies   Allergen Reactions     Hydromorphone Itching       FAMILY HISTORY:  Family History   Problem Relation Age of Onset     No Known Problems Brother      Arthritis Father      Hypertension Father      Hypertension Mother      Anxiety Disorder Mother      Hypertension Sister      No Known Problems Maternal Grandmother      No Known Problems Maternal Grandfather      No Known Problems Paternal Grandmother      No Known Problems Sister      No Known Problems Paternal Grandfather      Alcohol/Drug No family hx of   "    Gastrointestinal Disease No family hx of         no fam hx of liver disease or liver cancer       SOCIAL HISTORY:  Social History     Tobacco Use     Smoking status: Former Smoker     Packs/day: 0.33     Years: 3.00     Pack years: 0.99     Types: Cigarettes     Start date: 1997     Last attempt to quit: 2000     Years since quittin.4     Smokeless tobacco: Former User     Tobacco comment: Quit chewing in early    Substance Use Topics     Alcohol use: No     Alcohol/week: 0.0 standard drinks     Comment: ~10 drinks per day for ten years, quit in 2014     Drug use: No       ROS:   A comprehensive 10 point review of systems negative other than as mentioned in HPI.  Exam:  /86 (BP Location: Left arm, Patient Position: Chair, Cuff Size: Adult Regular)   Pulse 71   Ht 1.778 m (5' 10\")   Wt 98.4 kg (217 lb)   SpO2 99%   BMI 31.14 kg/m    GENERAL APPEARANCE: alert and no distress  HEENT: no icterus, no xanthelasmas, normal pupil size and reaction, normal palate, mucosa moist, no central cyanosis  NECK: no adenopathy, no asymmetry, masses, or scars, thyroid normal to palpation and no bruits, JVP not elevated  RESPIRATORY: lungs clear to auscultation - no rales, rhonchi or wheezes, no use of accessory muscles, no retractions, respirations are unlabored, normal respiratory rate  CARDIOVASCULAR: regular rhythm, normal S1 with physiologic split S2, no S3 or S4 and no murmur, click or rub, precordium quiet with normal PMI.  ABDOMEN: soft, non tender, bowel sounds normal, non-distended  EXTREMITIES: peripheral pulses normal, no edema  NEURO: alert and oriented to person/place/time, normal speech, gait and affect  SKIN: no ecchymoses, no rashes  PSYCH: normal affect, cooperative    Labs:  Reviewed.     Testing/Procedures:  PULMONARY FUNCTION TESTS:   PFT Latest Ref Rng & Units 10/14/2014   FVC L 4.95   FEV1 L 4.38   FVC% % 92   FEV1% % 100     3/30/18 ECHOCARDIOGRAM:   Patient after " atrial switch operation for complete transposition of the great  arteries. Technically difficult study due to poor acoustic windows. No baffle  obstruction or leaks seen. There is moderate right ventricular enlargement.  Single plane right ventricular EF 35-40 %. Normal left ventricular systolic  function. No outflow obstruction. Mild tricuspid regurgitation.    3/2017 CMRI (OSH REPORT):  1. Transposition of the great arteries with native atrioventricular   concordance and ventricular arterial discordance with the aorta anterior   to the pulmonary artery (D-transposition of the great arteries).  2. Status post interatrial baffle with the superior vena cava and inferior   vena cava baffled to the left (subpulmonic) ventricle without obstruction.    There is no evidence of a baffle leak.    3. Systemic right ventricle:  a. The right ventricle is hypertrophied.  b. Decreased systolic function with an ejection fraction of 33%.  c. Severe dilation of the right ventricle with an end-diastolic volume of   208 mL/m  (previously 188 mL/m ) and end-systolic volume of 140 mL/m    (previous 123 mL/m ).  4. The right ventricle connects with the anterior aorta.  The coronary   artery origins are usual for transposition.  Left-sided aortic arch with   measurements above.  5. The pulmonary venous baffle to the right ventricle is widely patent.  6. The subpulmonary left ventricle has normal systolic function with   decreased myocardial mass.  The left ventricle connects to the pulmonary   artery, which is posterior to the aorta.  7. No significant change from previous cardiac MRI of 9/4/2012 except an   increased size in the indexed right ventricular diastolic and systolic   Volumes.  3/4/2020 CMRIMRA:  CONCLUSIONS: d-TGA with atrial baffle repair.  The systemic ventricle is moderately enlarged with severely  reduced function, EF 27%. The subpulmonic ventricle is small in size with normal function, EF 57%. Normal  baffles without  any obstruction, thrombus or leakage. There is mild-moderate regurgitation of the systemic  atrioventricular (tricuspid) valve. There is no residual intracardiac shunting (Qp/Qs 1.0).   The subpulmonic ventricle exhibits extensive fatty metaplasia in the mid and apical segments of unclear  significance. Narrowing of intra-hepatic IVC of unclear significance.   Compared to the prior examination dated 03/09/2017 the systemic ventricle's function appears similar  visually. The fatty metaplasia of sub-pulmonic ventricle and narrowing of intra-hepatic IVC  is unchanged  from prior examinations.   1/2019 ECHO:  ------CONCLUSIONS------  Patient after atrial switch operation for complete transposition of the great  arteries. Technically difficult study due to poor acoustic windows. There is  moderate right ventricular enlargement. The right ventricular function is  qualitatively moderately depressed. Qualitivley the right ventricle function  is unchanged form the 3/30/2018 echo. Mild (1+) tricuspid valve insufficiency.  No obvious baffle obstruction or leaks seen. Qualitatively normal left  ventricular systolic function. Mild (1+) pulmonary valve insufficiency.  Limited visualization of the LPA suggests narrowing. RPA not seen.    Assessment and Plan:   Mr. Chen is a 39 year old male who has a past medical history significant for dTGA s/p modified Shoemaker operation and stitch closure of small VSD 1981, AFL s/p DCCV 3/7/18 and s/p ablation 8/2000, liver and kidney transplant 2016, post transplant lymphoproliferative disorder, papillary thyroid cancer s/p thyroidectomy, prior ETOH abuse (sober since 2014), anxiety, and depression. He presents today for follow up. He reports feeling well. He denies any chest pain/pressures, dizziness, lightheadedness, worsening shortness of breath, leg/ankle swelling, PND, orthopnea, palpitations, or syncopal symptoms. Recent CMRI/MRA showed systemic EF 37%, normal baffles without  obstruction, fatty metaplasia of sub-pulmonic ventricle and narrowing of intra-hepatic IVC which are unchanged. A zio patch from 2019 showed SR with 7 NSVT episodes. Presenting 12 lead ECG shows NSR IRBBB Vent Rate 65 bpm,  ms,  ms, QTc 443 ms. Current cardiac medications include: Toprol XL, Eliquis, Lisinopril, and Norvasc.     Atrial Flutter:   We discussed in detail with the patient management/treatment options for AFL includin. Stroke Prophylaxis:  CHADSVASC=+HF, +HTN  2, corresponding to a 2.2% annual stroke / systemic [facility] event rate. indicating need for long term oral anticoagulation. He also has congenital anatomy which we would recommend anticoagulation. He is appropriately on Eliquis. No bleeding isues.    2. Rate Control: Continue Metoprolol XL 25 mg daily.   3. Rhythm Control: Cardioversion, Antiarrhythmics and/or ablation are options for rhythm control. He has previously been on Sotalol. Not currently on AATs. S/p DCCV 3/7/18  with history of Ablation at OSH in . Organized A.flutter is highly amenable to ablation procedure. An ablation can be considered for any recurrence.   4. Risk Factor Management: maintain tight BP control, and JUAN evaluation.       dTGA systemic EF 27%, NYHA I-II:  1. ACEi/ARB: Continue Lisiopril.  2. BB: Continue Metoprolol XL 25 mg daily.   3. Aldosterone antagonist: not required.  4. SCD prophylaxis: discussed that systemic EF read 27% recently, but relatively unchanged visually from prior imaging. We discussed that ICD can be considered, but there is limited data for congential patients. We discussed with Dr. Alba and elected to defer for now.   5. Fluid status: euvolemic on exam    Follow up in about 1 year to be timed with Dr. Alba follow up. Zio patch completed prior.     The patient states understanding and is agreeable with plan.   Jaylen George MD [facility]  Cardiology - Electrophysiology

## 2020-03-13 NOTE — PROGRESS NOTES
Per Dr. Alba, patient to have 14 day Zio Patch monitor placed.  Diagnosis: Atrial flutter, unspecified, I48.92  Monitor placed: No  Patient Instructed: Yes  Patient verbalized understanding: Yes  Holter # [id number]    Pt is placing the monitor on himself on 3/22/20. I instructed the pt on placement and he verbalized understanding of instructions.    Wilbur Leger, CMA

## 2020-03-13 NOTE — PATIENT INSTRUCTIONS
You were seen today in the Adult Congenital and Cardiovascular Genetics Clinic at the [facility].    Cardiology Providers you saw during your visit:  Dr Francesca Alba and Dr Jaylen George    Diagnosis:  D-TGA    Results:  The results of your EKG were reviewed with you in clinic today    Recommendations:    1.  Continue to eat a heart healthy, low salt diet.  2.  Continue to get 20-30 minutes of aerobic activity, 4-5 days per week.  Examples of aerobic activity include walking, running, swimming, cycling, etc.  3.  Continue to observe good oral hygiene, with regular dental visits.  4.  We will place a 2 week Zio monitor today.     SBE prophylaxis:   Yes____  No____     Lifelong Bacterial Endocarditis Prophylaxis:  YES____  NO____    If YES is checked, follow the recommendations outlined below:   1. Take antibiotic(s) prior to recommended dental procedures and procedures on the respiratory tract or with infected skin, muscle or bones. SBE prophylaxis is not needed for routine GI and  procedures (ie. Colonoscopy or vaginal delivery)   2. Observe good oral hygiene daily, as advised by your dentist. Get regular professional dental care.   3. Keep cuts clean.   4. Infections should be treated promptly.   5. Symptoms of Infective Endocarditis could include: fever lasting more than 4-5 days or a recurrent fever that initially resolves but returns within 1-2 days)     Exercise restrictions:   Yes____  No__x__         If yes, list restrictions:  Must be allowed to rest if fatigued or SOB      Work restrictions:  Yes____  No__x__         If yes, list restrictions:    FASTING CHOLESTEROL was checked in the last 5 years YES_x__  NO___  2018  Continue to eat a heart healthy, low salt diet.         ____ Fasting lipid panel order today         ____ No changes in medications          ____ I recommend the following changes in your cholesterol medications.:          ____ Please follow up for cholesterol screening at your  primary care physician      Follow-up:  Follow up with Dr Alba and Dr George in one year with an echo     For after hours urgent needs, call [phone] and ask to speak to the Adult Congenital Physician on call.  Mention Job Code [id number].    For emergencies call [phone].    For any scheduling needs, please call [facility] , at [phone]  Thank you for your visit today!  If you have questions or concerns about today's visit, please call me.    Gagandeep Corado RN, BSN  Cardiology Care Coordinator  [facility]  [phone]    [address] [place] [address]  Mail Code [id number]  [place] [address]

## 2020-03-13 NOTE — PROGRESS NOTES
[facility]  HPI:   Mr. Chen is a 39 year old male who has a past medical history significant for dTGA s/p modified Shoemaker operation and stitch closure of small VSD 1981, AFL s/p DCCV and s/p ablation 8/2000, liver and kidney transplant 2016, post transplant lymphoproliferative disorder, papillary thyroid cancer s/p thyroidectomy, prior ETOH abuse (sober since 2014), anxiety, and depression. He presents today for follow up.     He presented to [facility] on 3/6/18 with shortness of breath and chest flutterings that started the day prior. He recalled it felt similar to when he was in AFL in the past. In the [facility], he was found to be in AFL. He was subsequently admitted. He was started on Eliquis and arranged for NEELIMA/DCCV. He had successful DCCV 3/7/18 and he was discharged. He states that he had AFL in the past. He believes he had a DCCV around age 7. He had also been maintained on Sotalol. He also reports a previous ablation at an OSH in 8/2000 (records not available to us at this time). He did well without AFL issues until 3/2018 when he had recurrence. Most recent echo from 3/30/18 showed systemic EF 35-40%, mild TR, and no evidence of baffle leaks/obstructions. A CMRI from OSH from 3/2017 showed no baffle  Obstruction and systemic EF 33%. [facility]. He was doing well. He had not noted any further arrhythmias since his DCCV. He followed up in EP clinic in 7/2018 and was doing well without recurrent AFL.     EP Visit 3/2019: He presents today for follow up. He presented to [facility] on 1/16/19 reporting some shortness of breath and palpitations. He was in sinus on presentation to ER. He was discharged on a zio patch monitor. Zio patch from 1/17/19-1/31/19 showed SR with rare ectopy and 7 NSVT up to 10 beats. 3 symptom activations showed sinus. An echo from 1/2019 showed moderately depressed Rv function, no obvious baffle obstructions or leaks. He reports feeling well and no  recurrent palpitations. He denies any chest pain/pressures, dizziness, lightheadedness, worsening shortness of breath, leg/ankle swelling, PND, orthopnea, palpitations, or syncopal symptoms. Presenting 12 lead ECG shows SB Vent Rate 59 bpm,  ms,  ms, QTc 429 ms. Current cardiac medications include: Toprol XL, Eliquis, Lisinopril, and Norvasc.     He presents today for follow up. He reports feeling well. He denies any chest pain/pressures, dizziness, lightheadedness, worsening shortness of breath, leg/ankle swelling, PND, orthopnea, palpitations, or syncopal symptoms. Recent CMRI/MRA showed systemic EF 37%, normal baffles without obstruction, fatty metaplasia of sub-pulmonic ventricle and narrowing of intra-hepatic IVC which are unchanged. A zio patch from June 2019 showed SR with 7 NSVT episodes. Presenting 12 lead ECG shows NSR IRBBB Vent Rate 65 bpm,  ms,  ms, QTc 443 ms. Current cardiac medications include: Toprol XL, Eliquis, Lisinopril, and Norvasc.       PAST MEDICAL HISTORY:  Past Medical History:   Diagnosis Date     Alcohol abuse     Last drink in Mid-April 2014     Anemia in ESRD (end-stage renal disease) (H)      Anxiety 2008     Atrial flutter (H) 2017     Cirrhosis (H)     S/P liver transplant     Depression      History of blood transfusion      History of transposition of great vessels     atrial switch at age 8 months old     Hypertension      Liver transplant recipient (H)     2016     Papillary thyroid carcinoma (H)      Pneumonia 11-15-14     Renal transplant recipient     2016     Varices, esophageal (H)        CURRENT MEDICATIONS:  Current Outpatient Medications   Medication Sig Dispense Refill     apixaban ANTICOAGULANT (ELIQUIS ANTICOAGULANT) 5 MG tablet Take 1 tablet (5 mg) by mouth 2 times daily 180 tablet 2     busPIRone (BUSPAR) 15 MG tablet Take one tablet (15 mg) twice daily for anxiety.Call clinic to schedule follow up appointment. 180 tablet 1     desonide  (DESOWEN) 0.05 % ointment Apply topically 2 times daily (Patient not taking: Reported on 2/6/2020) 15 g 3     hydrOXYzine (ATARAX) 50 MG tablet Take 1-2 tablets ( mg) by mouth nightly as needed for anxiety 90 tablet 1     ketoconazole (NIZORAL) 2 % cream Twice daily to areas of rash on face 60 g 1     ketoconazole (NIZORAL) 2 % shampoo Use as a foaming face wash in shower daily 120 mL 3     levothyroxine (SYNTHROID/LEVOTHROID) 137 MCG tablet Take 1 tablet by mouth Monday - Saturday  and 1.5 tablets on Sunday 96 tablet 3     lisinopril (PRINIVIL/ZESTRIL) 10 MG tablet Take 1 tablet (10 mg) by mouth daily 90 tablet 1     magnesium oxide (MAG-OX) 400 MG tablet TAKE ONE TABLET BY MOUTH TWICE A  tablet 11     metoprolol succinate ER (TOPROL-XL) 25 MG 24 hr tablet Take 1 tablet (25 mg) by mouth daily 90 tablet 3     mycophenolate (GENERIC EQUIVALENT) 250 MG capsule Take 2 capsules (500 mg) by mouth 2 times daily 120 capsule 11     sulfamethoxazole-trimethoprim (BACTRIM/SEPTRA) 400-80 MG tablet Take 1 tablet by mouth daily 30 tablet 11     tacrolimus (GENERIC EQUIVALENT) 0.5 MG capsule Take 1 capsule (0.5 mg) by mouth every 12 hours *total dose 1.5 mg every 12 hours 60 capsule 11     tacrolimus (GENERIC EQUIVALENT) 1 MG capsule Take 1 capsule (1 mg) by mouth every 12 hours *total dose 1.5 mg every 12 hours 60 capsule 11       PAST SURGICAL HISTORY:  Past Surgical History:   Procedure Laterality Date     ANESTHESIA CARDIOVERSION N/A 3/7/2018    Procedure: ANESTHESIA CARDIOVERSION;  Cardioversion;  Surgeon: GENERIC ANESTHESIA PROVIDER;  Location:  OR     BENCH LIVER N/A 5/10/2016    Procedure: BENCH LIVER;  Surgeon: Ricky Deshpande MD;  Location: [facility] OR     BIOPSY LYMPH NODE CERVICAL Right 1/26/2017    Procedure: BIOPSY LYMPH NODE CERVICAL;  Surgeon: Beka Soto MD;  Location: [facility] OR     CARDIAC SURGERY  9-10-80     CREATE FISTULA ARTERIOVENOUS UPPER EXTREMITY Right 9/16/2014    Procedure: CREATE FISTULA  ARTERIOVENOUS UPPER EXTREMITY;  Surgeon: Padmaja Eaton MD;  Location: [facility] OR     CV RIGHT HEART CATH N/A 4/19/2019    Procedure: CV RIGHT HEART CATH;  Surgeon: Sabi Wiggins MD;  Location:  HEART CARDIAC CATH LAB     CYSTOSCOPY, REMOVE STENT(S), COMBINED Right 6/22/2016    Procedure: COMBINED CYSTOSCOPY, REMOVE STENT(S);  Surgeon: Ricky Deshpande MD;  Location: [facility] OR     EMBOLECTOMY UPPER EXTREMITY Right 8/17/2018    Procedure: EMBOLECTOMY UPPER EXTREMITY;  Repair Right Upper Arm Pseudo Aneursym ;  Surgeon: John Payton MD;  Location: [facility] OR     ENT SURGERY       ESOPHAGOSCOPY, GASTROSCOPY, DUODENOSCOPY (EGD), COMBINED  5/30/2014    Procedure: COMBINED ESOPHAGOSCOPY, GASTROSCOPY, DUODENOSCOPY (EGD);  Surgeon: Guillaume Bautista MD;  Location:  GI     ESOPHAGOSCOPY, GASTROSCOPY, DUODENOSCOPY (EGD), COMBINED  9/30/14     ESOPHAGOSCOPY, GASTROSCOPY, DUODENOSCOPY (EGD), COMBINED Left 3/12/2015    Procedure: COMBINED ESOPHAGOSCOPY, GASTROSCOPY, DUODENOSCOPY (EGD);  Surgeon: Laureen Galvan MD;  Location:  GI     ESOPHAGOSCOPY, GASTROSCOPY, DUODENOSCOPY (EGD), COMBINED N/A 4/21/2016    Procedure: COMBINED ESOPHAGOSCOPY, GASTROSCOPY, DUODENOSCOPY (EGD);  Surgeon: Laureen Galvan MD;  Location:  GI     ESOPHAGOSCOPY, GASTROSCOPY, DUODENOSCOPY (EGD), COMBINED N/A 7/21/2016    Procedure: COMBINED ESOPHAGOSCOPY, GASTROSCOPY, DUODENOSCOPY (EGD);  Surgeon: Laureen Galvan MD;  Location:  GI     HC OR CATH ABLATION NON-CARDIAC ENDOVASCULAR  1990,2002    SVT     INSERT PORT VASCULAR ACCESS N/A 4/3/2017    Procedure: INSERT PORT VASCULAR ACCESS;  Surgeon: Rajendra Jacques PA-C;  Location: [facility] OR     IR PORT REMOVAL LEFT  1/22/2019     LIGATE FISTULA ARTERIOVENOUS UPPER EXTREMITY Right 11/7/2017    Procedure: LIGATE FISTULA ARTERIOVENOUS UPPER EXTREMITY;  Revision of Right Arm Arteriovenous Fistula ;  Surgeon: Padmaja Eaton MD;  Location:  OR      PERCUTANEOUS BIOPSY KIDNEY Right 2018    Procedure: PERCUTANEOUS BIOPSY KIDNEY;  Right Kidney Biopsy;  Surgeon: Yuval Khan MD;  [facility]: [facility] OR     PERCUTANEOUS BIOPSY KIDNEY Right 10/30/2019    Procedure: Right Side Kidney Biopsy;  Surgeon: Jake Sidhu MD;  Location: [facility] OR     PICC INSERTION  14    PICC line placement 14; Removal 2014     REMOVE PORT VASCULAR ACCESS Right 2019    Procedure: Right chest Port Removal;  Surgeon: Erasmo Ziegler PA-C;  Location: [facility] OR     THORACIC SURGERY  1980    Transposition great arteries, repaired at 8 months     THYROIDECTOMY Right 2017    Procedure: THYROIDECTOMY;  Bilateral Total Thyroidectomy ;  Surgeon: Beka Soto MD;  Location: [facility] OR     TRANSPLANT KIDNEY RECIPIENT  DONOR  5/10/2016    Procedure: TRANSPLANT KIDNEY RECIPIENT  DONOR;  Surgeon: Ricky Deshpande MD;  Location: [facility] OR     TRANSPLANT LIVER RECIPIENT  DONOR N/A 5/10/2016    Procedure: TRANSPLANT LIVER RECIPIENT  DONOR;  Surgeon: Ricky Deshpande MD;  Location: [facility] OR       ALLERGIES:     Allergies   Allergen Reactions     Hydromorphone Itching       FAMILY HISTORY:  Family History   Problem Relation Age of Onset     No Known Problems Brother      Arthritis Father      Hypertension Father      Hypertension Mother      Anxiety Disorder Mother      Hypertension Sister      No Known Problems Maternal Grandmother      No Known Problems Maternal Grandfather      No Known Problems Paternal Grandmother      No Known Problems Sister      No Known Problems Paternal Grandfather      Alcohol/Drug No family hx of      Gastrointestinal Disease No family hx of         no fam hx of liver disease or liver cancer       SOCIAL HISTORY:  Social History     Tobacco Use     Smoking status: Former Smoker     Packs/day: 0.33     Years: 3.00     Pack years: 0.99     Types: Cigarettes     Start date: 1997     Last attempt to quit:  "2000     Years since quittin.4     Smokeless tobacco: Former User     Tobacco comment: Quit chewing in early    Substance Use Topics     Alcohol use: No     Alcohol/week: 0.0 standard drinks     Comment: ~10 drinks per day for ten years, quit in 2014     Drug use: No       ROS:   A comprehensive 10 point review of systems negative other than as mentioned in HPI.  Exam:  /86 (BP Location: Left arm, Patient Position: Chair, Cuff Size: Adult Regular)   Pulse 71   Ht 1.778 m (5' 10\")   Wt 98.4 kg (217 lb)   SpO2 99%   BMI 31.14 kg/m    GENERAL APPEARANCE: alert and no distress  HEENT: no icterus, no xanthelasmas, normal pupil size and reaction, normal palate, mucosa moist, no central cyanosis  NECK: no adenopathy, no asymmetry, masses, or scars, thyroid normal to palpation and no bruits, JVP not elevated  RESPIRATORY: lungs clear to auscultation - no rales, rhonchi or wheezes, no use of accessory muscles, no retractions, respirations are unlabored, normal respiratory rate  CARDIOVASCULAR: regular rhythm, normal S1 with physiologic split S2, no S3 or S4 and no murmur, click or rub, precordium quiet with normal PMI.  ABDOMEN: soft, non tender, bowel sounds normal, non-distended  EXTREMITIES: peripheral pulses normal, no edema  NEURO: alert and oriented to person/place/time, normal speech, gait and affect  SKIN: no ecchymoses, no rashes  PSYCH: normal affect, cooperative    Labs:  Reviewed.     Testing/Procedures:  PULMONARY FUNCTION TESTS:   PFT Latest Ref Rng & Units 10/14/2014   FVC L 4.95   FEV1 L 4.38   FVC% % 92   FEV1% % 100         3/30/18 ECHOCARDIOGRAM:   Patient after atrial switch operation for complete transposition of the great  arteries. Technically difficult study due to poor acoustic windows. No baffle  obstruction or leaks seen. There is moderate right ventricular enlargement.  Single plane right ventricular EF 35-40 %. Normal left ventricular systolic  function. No " outflow obstruction. Mild tricuspid regurgitation.    3/2017 CMRI (OSH REPORT):  1. Transposition of the great arteries with native atrioventricular   concordance and ventricular arterial discordance with the aorta anterior   to the pulmonary artery (D-transposition of the great arteries).  2. Status post interatrial baffle with the superior vena cava and inferior   vena cava baffled to the left (subpulmonic) ventricle without obstruction.    There is no evidence of a baffle leak.    3. Systemic right ventricle:  a. The right ventricle is hypertrophied.  b. Decreased systolic function with an ejection fraction of 33%.  c. Severe dilation of the right ventricle with an end-diastolic volume of   208 mL/m  (previously 188 mL/m ) and end-systolic volume of 140 mL/m    (previous 123 mL/m ).  4. The right ventricle connects with the anterior aorta.  The coronary   artery origins are usual for transposition.  Left-sided aortic arch with   measurements above.  5. The pulmonary venous baffle to the right ventricle is widely patent.  6. The subpulmonary left ventricle has normal systolic function with   decreased myocardial mass.  The left ventricle connects to the pulmonary   artery, which is posterior to the aorta.  7. No significant change from previous cardiac MRI of 9/4/2012 except an   increased size in the indexed right ventricular diastolic and systolic   Volumes.  3/4/2020 CMRIMRA:  CONCLUSIONS: d-TGA with atrial baffle repair.  The systemic ventricle is moderately enlarged with severely  reduced function, EF 27%. The subpulmonic ventricle is small in size with normal function, EF 57%. Normal  baffles without any obstruction, thrombus or leakage. There is mild-moderate regurgitation of the systemic  atrioventricular (tricuspid) valve. There is no residual intracardiac shunting (Qp/Qs 1.0).   The subpulmonic ventricle exhibits extensive fatty metaplasia in the mid and apical segments of unclear  significance.  Narrowing of intra-hepatic IVC of unclear significance.   Compared to the prior examination dated 03/09/2017 the systemic ventricle's function appears similar  visually. The fatty metaplasia of sub-pulmonic ventricle and narrowing of intra-hepatic IVC  is unchanged  from prior examinations.   1/2019 ECHO:  ------CONCLUSIONS------  Patient after atrial switch operation for complete transposition of the great  arteries. Technically difficult study due to poor acoustic windows. There is  moderate right ventricular enlargement. The right ventricular function is  qualitatively moderately depressed. Qualitivley the right ventricle function  is unchanged form the 3/30/2018 echo. Mild (1+) tricuspid valve insufficiency.  No obvious baffle obstruction or leaks seen. Qualitatively normal left  ventricular systolic function. Mild (1+) pulmonary valve insufficiency.  Limited visualization of the LPA suggests narrowing. RPA not seen.    Assessment and Plan:   Mr. Chen is a 39 year old male who has a past medical history significant for dTGA s/p modified Shoemaker operation and stitch closure of small VSD 1981, AFL s/p DCCV 3/7/18 and s/p ablation 8/2000, liver and kidney transplant 2016, post transplant lymphoproliferative disorder, papillary thyroid cancer s/p thyroidectomy, prior ETOH abuse (sober since 2014), anxiety, and depression. He presents today for follow up. He reports feeling well. He denies any chest pain/pressures, dizziness, lightheadedness, worsening shortness of breath, leg/ankle swelling, PND, orthopnea, palpitations, or syncopal symptoms. Recent CMRI/MRA showed systemic EF 37%, normal baffles without obstruction, fatty metaplasia of sub-pulmonic ventricle and narrowing of intra-hepatic IVC which are unchanged. A zio patch from June 2019 showed SR with 7 NSVT episodes. Presenting 12 lead ECG shows NSR IRBBB Vent Rate 65 bpm,  ms,  ms, QTc 443 ms. Current cardiac medications include: Toprol XL,  Eliquis, Lisinopril, and Norvasc.     Atrial Flutter:   We discussed in detail with the patient management/treatment options for AFL includin. Stroke Prophylaxis:  CHADSVASC=+HF, +HTN  2, corresponding to a 2.2% annual stroke / systemic [facility] event rate. indicating need for long term oral anticoagulation. He also has congenital anatomy which we would recommend anticoagulation. He is appropriately on Eliquis. No bleeding isues.    2. Rate Control: Continue Metoprolol XL 25 mg daily.   3. Rhythm Control: Cardioversion, Antiarrhythmics and/or ablation are options for rhythm control. He has previously been on Sotalol. Not currently on AATs. S/p DCCV 3/7/18  with history of Ablation at OSH in . Organized A.flutter is highly amenable to ablation procedure. An ablation can be considered for any recurrence.   4. Risk Factor Management: maintain tight BP control, and JUAN evaluation.       dTGA systemic EF 27%, NYHA I-II:  1. ACEi/ARB: Continue Lisiopril.  2. BB: Continue Metoprolol XL 25 mg daily.   3. Aldosterone antagonist: not required.  4. SCD prophylaxis: discussed that systemic EF read 27% recently, but relatively unchanged visually from prior imaging. We discussed that ICD can be considered, but there is limited data for congential patients. We discussed with Dr. Alba and elected to defer for now.   5. Fluid status: euvolemic on exam    Follow up in about 1 year to be timed with Dr. Alba follow up. Zio patch completed prior.     The patient states understanding and is agreeable with plan.   Jaylen George MD [facility]  Cardiology - Electrophysiology

## 2020-03-13 NOTE — NURSING NOTE
Chief Complaint   Patient presents with     Follow Up     Congenitial heart return      Vitals were taken and medications were reconciled.    EKG Completed      Francis Camarillo CMA    1:45 PM

## 2020-03-13 NOTE — LETTER
3/13/2020      RE: Cricket Chen  [address]  [facility] [address]       Dear Colleague,    Thank you for the opportunity to participate in the care of your patient, Cricket Chen, at the [facility] at [facility]. Please see a copy of my visit note below.    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  Pt reports that since last clinic viisithe has been feeling well.  He denies any chest pain or pressure, sob/lucia, orthopnea, pnd, syncope/presyncope, suzette or change in exercise tolerance.  He does note some rare palpitations but these are brief induration with no significant associated symptoms. He denies any problems with his medications and reports compliance.         Past Medical History:   Diagnosis Date     Alcohol abuse     Last drink in Mid-April 2014     Anemia in ESRD (end-stage renal disease) (H)      Anxiety 2008     Atrial flutter (H) 2017     Cirrhosis (H)     S/P liver transplant     Depression      History of blood transfusion      History of transposition of great vessels     atrial switch at age 8 months old     Hypertension      Liver transplant recipient (H)     2016     Papillary thyroid carcinoma (H)      Pneumonia 11-15-14     Renal transplant recipient     2016     Varices, esophageal (H)        Past Surgical History:   Procedure Laterality Date     ANESTHESIA CARDIOVERSION N/A 3/7/2018    Procedure: ANESTHESIA CARDIOVERSION;  Cardioversion;  Surgeon: GENERIC ANESTHESIA PROVIDER;  Location: [facility] OR     BENCH LIVER N/A 5/10/2016    Procedure: BENCH LIVER;  Surgeon:  Ricky Deshpande MD;  [facility]: [facility]     BIOPSY LYMPH NODE CERVICAL Right 1/26/2017    Procedure: BIOPSY LYMPH NODE CERVICAL;  Surgeon: Beka Soto MD;  [facility]     CARDIAC SURGERY  9-10-80     CREATE FISTULA ARTERIOVENOUS UPPER EXTREMITY Right 9/16/2014    Procedure: CREATE FISTULA ARTERIOVENOUS UPPER EXTREMITY;  Surgeon: Padmaja Eaton MD;  Location: [facility] OR     CV RIGHT HEART CATH N/A 4/19/2019    Procedure: CV RIGHT HEART CATH;  Surgeon: Sabi Wiggins MD;  Location:  HEART CARDIAC CATH LAB     CYSTOSCOPY, REMOVE STENT(S), COMBINED Right 6/22/2016    Procedure: COMBINED CYSTOSCOPY, REMOVE STENT(S);  Surgeon: Ricky Deshpande MD;  [facility]     EMBOLECTOMY UPPER EXTREMITY Right 8/17/2018    Procedure: EMBOLECTOMY UPPER EXTREMITY;  Repair Right Upper Arm Pseudo Aneursym ;  Surgeon: John Payton MD;  Location[facility] OR     ENT SURGERY       ESOPHAGOSCOPY, GASTROSCOPY, DUODENOSCOPY (EGD), COMBINED  5/30/2014    Procedure: COMBINED ESOPHAGOSCOPY, GASTROSCOPY, DUODENOSCOPY (EGD);  Surgeon: Guillaume Bautista MD;  Location:  GI     ESOPHAGOSCOPY, GASTROSCOPY, DUODENOSCOPY (EGD), COMBINED  9/30/14     ESOPHAGOSCOPY, GASTROSCOPY, DUODENOSCOPY (EGD), COMBINED Left 3/12/2015    Procedure: COMBINED ESOPHAGOSCOPY, GASTROSCOPY, DUODENOSCOPY (EGD);  Surgeon: Laureen Galvan MD;  Location:  GI     ESOPHAGOSCOPY, GASTROSCOPY, DUODENOSCOPY (EGD), COMBINED N/A 4/21/2016    Procedure: COMBINED ESOPHAGOSCOPY, GASTROSCOPY, DUODENOSCOPY (EGD);  Surgeon: Laureen Galvan MD;  Location:  GI     ESOPHAGOSCOPY, GASTROSCOPY, DUODENOSCOPY (EGD), COMBINED N/A 7/21/2016    Procedure: COMBINED ESOPHAGOSCOPY, GASTROSCOPY, DUODENOSCOPY (EGD);  Surgeon: Laureen Galvan MD;  Location:  GI     HC OR CATH ABLATION NON-CARDIAC ENDOVASCULAR  1990,2002    SVT     INSERT PORT VASCULAR ACCESS N/A 4/3/2017    Procedure: INSERT PORT VASCULAR ACCESS;  Surgeon: Georgie  Rajendra Hernandez PA-C;  [facility]     IR PORT REMOVAL LEFT  2019     LIGATE FISTULA ARTERIOVENOUS UPPER EXTREMITY Right 2017    Procedure: LIGATE FISTULA ARTERIOVENOUS UPPER EXTREMITY;  Revision of Right Arm Arteriovenous Fistula ;  Surgeon: Padmaja Eaton MD;  [facility]     PERCUTANEOUS BIOPSY KIDNEY Right 2018    Procedure: PERCUTANEOUS BIOPSY KIDNEY;  Right Kidney Biopsy;  Surgeon: Yuval Khan MD;  [facility]     PERCUTANEOUS BIOPSY KIDNEY Right 10/30/2019    Procedure: Right Side Kidney Biopsy;  Surgeon: Jake Sidhu MD;  [facility]     PICC INSERTION  14    PICC line placement 14; Removal 2014     REMOVE PORT VASCULAR ACCESS Right 2019    Procedure: Right chest Port Removal;  Surgeon: Erasmo Ziegler PA-C;  [facility]     THORACIC SURGERY      Transposition great arteries, repaired at 8 months     THYROIDECTOMY Right 2017    Procedure: THYROIDECTOMY;  Bilateral Total Thyroidectomy ;  Surgeon: Beka Soto MD;  [facility]     TRANSPLANT KIDNEY RECIPIENT  DONOR  5/10/2016    Procedure: TRANSPLANT KIDNEY RECIPIENT  DONOR;  Surgeon: Ricky Deshpande MD;  [facility]     TRANSPLANT LIVER RECIPIENT  DONOR N/A 5/10/2016    Procedure: TRANSPLANT LIVER RECIPIENT  DONOR;  Surgeon: Ricky Deshpande MD;  [facility]   As above, including HPI    Family History   Problem Relation Age of Onset     No Known Problems Brother      Arthritis Father      Hypertension Father      Hypertension Mother      Anxiety Disorder Mother      Hypertension Sister      No Known Problems Maternal Grandmother      No Known Problems Maternal Grandfather      No Known Problems Paternal Grandmother      No Known Problems Sister      No Known Problems Paternal Grandfather      Alcohol/Drug No family hx of      Gastrointestinal Disease No family hx of         no fam hx of liver disease or liver cancer  "      Meds  apixaban ANTICOAGULANT (ELIQUIS ANTICOAGULANT) 5 MG tablet, Take 1 tablet (5 mg) by mouth 2 times daily  busPIRone (BUSPAR) 15 MG tablet, Take one tablet (15 mg) twice daily for anxiety.Call clinic to schedule follow up appointment.  hydrOXYzine (ATARAX) 50 MG tablet, Take 1-2 tablets ( mg) by mouth nightly as needed for anxiety  ketoconazole (NIZORAL) 2 % cream, Twice daily to areas of rash on face  ketoconazole (NIZORAL) 2 % shampoo, Use as a foaming face wash in shower daily  levothyroxine (SYNTHROID/LEVOTHROID) 137 MCG tablet, Take 1 tablet by mouth Monday - Saturday  and 1.5 tablets on Sunday  lisinopril (PRINIVIL/ZESTRIL) 10 MG tablet, Take 1 tablet (10 mg) by mouth daily  magnesium oxide (MAG-OX) 400 MG tablet, TAKE ONE TABLET BY MOUTH TWICE A DAY  metoprolol succinate ER (TOPROL-XL) 25 MG 24 hr tablet, Take 1 tablet (25 mg) by mouth daily  mycophenolate (GENERIC EQUIVALENT) 250 MG capsule, Take 2 capsules (500 mg) by mouth 2 times daily  sulfamethoxazole-trimethoprim (BACTRIM/SEPTRA) 400-80 MG tablet, Take 1 tablet by mouth daily  desonide (DESOWEN) 0.05 % ointment, Apply topically 2 times daily (Patient not taking: Reported on 2/6/2020)    No current facility-administered medications on file prior to visit.       Allergies   Allergen Reactions     Hydromorphone Itching       ROS: 12 point ROS neg other than the symptoms noted above in the HPI.      /86 (BP Location: Left arm, Patient Position: Chair, Cuff Size: Adult Regular)   Pulse 71   Ht 1.778 m (5' 10\")   Wt 98.4 kg (217 lb)   SpO2 99%   BMI 31.14 kg/m    General: appears comfortable, alert and articulate  Head: normocephalic, atraumatic  Eyes: anicteric sclera, EOMI  Neck: no adenopathy or masses, 2+ carotids without bruits  Orophyarynx: moist mucosa, no lesions, dentition intact  Heart: regular, normal S1, loud S2, no murmur, gallop, or rub, estimated JVP <10 cm  Lungs: clear, no rales or wheezing  Abdomen: soft, " non-tender, bowel sounds present, no hepatomegaly  Extremities: no clubbing, cyanosis or edema  Neurological: normal speech and affect, no gross motor deficits    Labs:   Ref. Range 3/4/2020 11:13   Sodium Latest Ref Range: 133 - 144 mmol/L 139   Potassium Latest Ref Range: 3.4 - 5.3 mmol/L 4.4   Chloride Latest Ref Range: 94 - 109 mmol/L 106   Carbon Dioxide Latest Ref Range: 20 - 32 mmol/L 26   Urea Nitrogen Latest Ref Range: 7 - 30 mg/dL 24   Creatinine Latest Ref Range: 0.66 - 1.25 mg/dL 1.62 (H)   GFR Estimate Latest Ref Range: >60 mL/min/1.73_m2 52 (L)   GFR Estimate If Black Latest Ref Range: >60 mL/min/1.73_m2 61   Calcium Latest Ref Range: 8.5 - 10.1 mg/dL 9.1   Anion Gap Latest Ref Range: 3 - 14 mmol/L 7   Albumin Latest Ref Range: 3.4 - 5.0 g/dL 3.9   Protein Total Latest Ref Range: 6.8 - 8.8 g/dL 7.1   Bilirubin Total Latest Ref Range: 0.2 - 1.3 mg/dL 0.4   Alkaline Phosphatase Latest Ref Range: 40 - 150 U/L 87   ALT Latest Ref Range: 0 - 70 U/L 34   AST Latest Ref Range: 0 - 45 U/L 22   Bilirubin Direct Latest Ref Range: 0.0 - 0.2 mg/dL 0.1   Glucose Latest Ref Range: 70 - 99 mg/dL 108 (H)   WBC Latest Ref Range: 4.0 - 11.0 10e9/L 5.5   Hemoglobin Latest Ref Range: 13.3 - 17.7 g/dL 14.9   Hematocrit Latest Ref Range: 40.0 - 53.0 % 44.4   Platelet Count Latest Ref Range: 150 - 450 10e9/L 152   RBC Count Latest Ref Range: 4.4 - 5.9 10e12/L 5.12   MCV Latest Ref Range: 78 - 100 fl 87   MCH Latest Ref Range: 26.5 - 33.0 pg 29.1   MCHC Latest Ref Range: 31.5 - 36.5 g/dL 33.6   RDW Latest Ref Range: 10.0 - 15.0 % 13.3     Cardiac MRI 3/9/2017  Great arteries with native atrioventricular   concordance and ventricular arterial discordance with the aorta anterior   to the pulmonary artery (D-transposition of the great arteries).  2. Status post interatrial baffle with the superior vena cava and inferior   vena cava baffled to the left (subpulmonic) ventricle without obstruction.    There is no evidence of a  baffle leak.    3. Systemic right ventricle:  a. The right ventricle is hypertrophied.  b. Decreased systolic function with an ejection fraction of 33%.  c. Severe dilation of the right ventricle with an end-diastolic volume of   208 mL/m  (previously 188 mL/m ) and end-systolic volume of 140 mL/m    (previous 123 mL/m ).  4. The right ventricle connects with the anterior aorta.  The coronary   artery origins are usual for transposition.  Left-sided aortic arch with   measurements above.  5. The pulmonary venous baffle to the right ventricle is widely patent.  6. The subpulmonary left ventricle has normal systolic function with   decreased myocardial mass.  The left ventricle connects to the pulmonary   artery, which is posterior to the aorta.  7. No significant change from previous cardiac MRI of 9/4/2012 except an   increased size in the indexed right ventricular diastolic and systolic   Volumes    Echo (3/30/18):   Patient after atrial switch operation for complete transposition of the great arteries. Technically difficult study due to poor acoustic windows. No baffle obstruction or leaks seen. There is moderate right ventricular enlargement. Single plane right ventricular EF 35-40 %. Normal left ventricular systolic function. No outflow obstruction. Mild tricuspid regurgitation.    EKG (3/30:18): Sinus rhythm, RAD, RBBB, prominent RV forces with repolarization abnormality.      Echo 1/17/19  Patient after atrial switch operation for complete transposition of the great  arteries. Technically difficult study due to poor acoustic windows. There is  moderate right ventricular enlargement. The right ventricular function is  qualitatively moderately depressed. Qualitivley the right ventricle function  is unchanged form the 3/30/2018 echo. Mild (1+) tricuspid valve insufficiency.  No obvious baffle obstruction or leaks seen. Qualitatively normal left  ventricular systolic function. Mild (1+) pulmonary valve  insufficiency.  Limited visualization of the LPA suggests narrowing. RPA not seen.    Mercedes Feb 2019      CMR Report 3-   SUMMARY  Clinical history:39-year old male with a history of d-TGA (with baffle repair), small VSD (closed with a  stitch) and combined liver/renal transplant. He is known to have depressed systemic ventricle function. CMR  to evaluate RV function.   Comparison CMR: 03/09/2017 ([facility])  SITUS: There is normal situs The liver is in the right upper quadrant and a single spleen in the left  upper quadrant.   CAVAE: There is a single SVC and a single IVC.both drain unobstructed into the sub-pulmonic ventricle via  baffle.  SVC baffle measures 1.6 x 1.0 cm. IVC baffle measures 1.5 x 1.4 cm. The intrahepatic segment of  the native IVC measures 13.9 x 5.3 mm at its narrowest point. There does not appear to be any baffle  obstruction or leakage.   PULMONARY VEINS: There are four pulmonary veins with two left-sided veins and two right-sided veins.They  drain in an unobstructed fashion via baffle to the systemic ventricle.   ATRIOVENTRICULAR CONNECTION: The atrioventricular connection is discordant. There is mild-moderate  regurgitation of the systemic atrioventricular (tricuspid) valve. There is mild FARHANA of the sub-pulmonic AV  valve (mitral) without septal contact or obstruction.   VENTRICLES: There is levocardia. The systemic (morphologic right) ventricle is moderately enlarged with  severe hypertrophy. The global systolic function of the systemic ventricle is severely reduced. The  systemic ventricle's EF is 27%. The subpulmonic (morphologic left) ventricle is smallï cavity size. The  subpulmonic ventricle exhibits wall thinning and a pattern of fatty metaplasia with enhancement on LGE  imaging in the mid-anterolateral, mid anteroseptal, apical septal, and apical lateral segments. The global  systolic function of the subpulmonic ventricle is normal. The subpulmonic  ventricle's EF is 57%. There are  no regional wall motion abnormalities. The VSD repair is visualized in the basal septum and is intact.  There is no evidence of intracardiac shunting (Qp/Qs 1.0).  VENTRICULOARTERIAL CONNECTION: The ventriculoarterial connection is discordant.There is no significant  valvular disease.   AORTA AND SUPRA-AORTIC VESSELS: The aortic arch is left-sided with normal branching of the vessels.   PULMONARY ARTERY: The main pulmonary artery is mildly enlarged. The branch pulmonary arteries are normal in  size.   CONCLUSIONS: d-TGA with atrial baffle repair.  The systemic ventricle is moderately enlarged with severely  reduced function, EF 27%. The subpulmonic ventricle is small in size with normal function, EF 57%. Normal  baffles without any obstruction, thrombus or leakage. There is mild-moderate regurgitation of the systemic  atrioventricular (tricuspid) valve. There is no residual intracardiac shunting (Qp/Qs 1.0).   The subpulmonic ventricle exhibits extensive fatty metaplasia in the mid and apical segments of unclear  significance. Narrowing of intra-hepatic IVC of unclear significance.   Compared to the prior examination dated 03/09/2017 the systemic ventricle's function appears similar  visually. The fatty metaplasia of sub-pulmonic ventricle and narrowing of intra-hepatic IVC  is unchanged  from prior examinations.       Assessment and Plan:  38 yo M with history of d - transposition of the great vessels s/p baffle repair in 1981 with small VSD stitch closure, atrial flutter s/p ablation and recent cardioversion, with stably depressed systemic RV function, s/p liver and kidney transplant for EtOH abuse, post-transplant lymphoproliferative disorder, papillary thyroid cancer s/p thryoidectomy presents for ongoing evaluation and management.    1.  TGA s/p atrial switch now with stably depressed systemic ventricular function:  Although ventricular function is depressed this has been  stable for the past several years as confirmed by recent MRI.  In regard to heart failure medical regimen, his systemic ventricle is an anatomic right ventricle and there is no proven therapies to improve morbidity or mortality in right ventricular failure and especially systemic right ventricular failure.  As he is currently not experiencing any symptoms and does not have any evidence of volume overload on today's exam, thus we will not change his medication regimen at this point in time - will continue current doses of metoprolol xl and lisinopril.  Encouraged patient to begin regular aerobic exercise aiming for at least 150 minutes of moderate physical activity or 75 minutes of vigorous physical activity - or an equal combination of both - each week. and follow low-salt, heart healthy diet.  2.  H/o atrial flutter s/p ablation: 2 week Zio monitor today.  Seen by congenital EP today.  Please see their note for detailed findings and plan.    Follow-up:  One year with labs, echo ad EKG.  Will be happy to see sooner if change in clinical status or new questions/concerns arise.      Francesca Alba MD  Section Head - Advanced Heart Failure, Transplantation and Mechanical Circulatory Support  Director - [facility]  Associate Professor of Medicine, [facility]          Answers for HPI/ROS submitted by the patient on 3/13/2020   General Symptoms: No  Skin Symptoms: No  HENT Symptoms: No  EYE SYMPTOMS: No  HEART SYMPTOMS: Yes  LUNG SYMPTOMS: No  INTESTINAL SYMPTOMS: No  URINARY SYMPTOMS: No  REPRODUCTIVE SYMPTOMS: No  SKELETAL SYMPTOMS: No  BLOOD SYMPTOMS: No  NERVOUS SYSTEM SYMPTOMS: No  MENTAL HEALTH SYMPTOMS: No  Chest pain or pressure: No  Fast or irregular heartbeat: Yes  Pain in legs with walking: No  Trouble breathing while lying down: No  Fingers or toes appear blue: No  High blood pressure: No  Low blood pressure: No  Fainting: No  Murmurs: No  Pacemaker:  No  Varicose veins: No  Edema or swelling: No  Wake up at night with shortness of breath: No  Light-headedness: No  Exercise intolerance: No      Please do not hesitate to contact me if you have any questions/concerns.     Sincerely,     Francesca Alba MD

## 2020-03-19 PROCEDURE — 0298T ZZC EXT ECG > 48HR TO 21 DAY REVIEW AND INTERPRETATN: CPT | Performed by: INTERNAL MEDICINE

## 2020-04-02 ENCOUNTER — VIRTUAL VISIT (OUTPATIENT)
Dept: FAMILY MEDICINE | Facility: CLINIC | Age: 40
End: 2020-04-02
Payer: COMMERCIAL

## 2020-04-02 DIAGNOSIS — R45.86 MOOD DISTURBANCE: Primary | ICD-10-CM

## 2020-04-02 ASSESSMENT — ANXIETY QUESTIONNAIRES
3. WORRYING TOO MUCH ABOUT DIFFERENT THINGS: NOT AT ALL
GAD7 TOTAL SCORE: 3
2. NOT BEING ABLE TO STOP OR CONTROL WORRYING: SEVERAL DAYS
IF YOU CHECKED OFF ANY PROBLEMS ON THIS QUESTIONNAIRE, HOW DIFFICULT HAVE THESE PROBLEMS MADE IT FOR YOU TO DO YOUR WORK, TAKE CARE OF THINGS AT HOME, OR GET ALONG WITH OTHER PEOPLE: SOMEWHAT DIFFICULT
1. FEELING NERVOUS, ANXIOUS, OR ON EDGE: NOT AT ALL
7. FEELING AFRAID AS IF SOMETHING AWFUL MIGHT HAPPEN: NOT AT ALL
6. BECOMING EASILY ANNOYED OR IRRITABLE: SEVERAL DAYS
5. BEING SO RESTLESS THAT IT IS HARD TO SIT STILL: SEVERAL DAYS

## 2020-04-02 ASSESSMENT — PATIENT HEALTH QUESTIONNAIRE - PHQ9
SUM OF ALL RESPONSES TO PHQ QUESTIONS 1-9: 3
5. POOR APPETITE OR OVEREATING: NOT AT ALL

## 2020-04-02 NOTE — NURSING NOTE
39 year old  Chief Complaint   Patient presents with     MOOD CHANGES     Pt. has been experiencing mood swings and would like to discuss them.        There were no vitals taken for this visit. There is no height or weight on file to calculate BMI.  BP completed using cuff size:    Patient Active Problem List   Diagnosis     Atrial flutter (H)     Complete transposition of great vessels     Esophageal varices (H)     Insomnia     Alcoholic hepatitis     History of alcohol abuse     History of transposition of great vessels     Depression     Thrombocytopenia (H)     Pain medication agreement     Patient is followed by the [facility]     Liver replaced by transplant (H)     Kidney replaced by transplant     Immunosuppressed status (H)     Hypomagnesemia     Secondary renal hyperparathyroidism (H)     Chronic pain syndrome     Pain management contract signed     Aftercare following organ transplant     Post-transplant lymphoproliferative disorder (H)     Papillary thyroid carcinoma (H)     Advance directive discussed with patient     Acute alcoholic liver disease     Alcohol dependence (H)     Encounter for palliative care     Hypertension secondary to other renal disorders     Atrial fibrillation (H)     Postoperative hypothyroidism     Arm mass, right     Dermatitis, seborrheic     Chest pain     Shortness of breath     Other ill-defined heart diseases       Wt Readings from Last 2 Encounters:   03/13/20 98.4 kg (217 lb)   03/13/20 98.4 kg (217 lb)     BP Readings from Last 3 Encounters:   03/13/20 130/86   03/13/20 130/86   02/06/20 135/89       Allergies   Allergen Reactions     Hydromorphone Itching       Current Outpatient Medications   Medication     apixaban ANTICOAGULANT (ELIQUIS ANTICOAGULANT) 5 MG tablet     busPIRone (BUSPAR) 15 MG tablet     hydrOXYzine (ATARAX) 50 MG tablet     ketoconazole (NIZORAL) 2 % cream     ketoconazole (NIZORAL) 2 % shampoo      levothyroxine (SYNTHROID/LEVOTHROID) 137 MCG tablet     lisinopril (PRINIVIL/ZESTRIL) 10 MG tablet     magnesium oxide (MAG-OX) 400 MG tablet     metoprolol succinate ER (TOPROL-XL) 25 MG 24 hr tablet     mycophenolate (GENERIC EQUIVALENT) 250 MG capsule     sulfamethoxazole-trimethoprim (BACTRIM/SEPTRA) 400-80 MG tablet     tacrolimus (GENERIC EQUIVALENT) 0.5 MG capsule     tacrolimus (GENERIC EQUIVALENT) 1 MG capsule     desonide (DESOWEN) 0.05 % ointment     No current facility-administered medications for this visit.        Social History     Tobacco Use     Smoking status: Former Smoker     Packs/day: 0.33     Years: 3.00     Pack years: 0.99     Types: Cigarettes     Start date: 1997     Last attempt to quit: 2000     Years since quittin.5     Smokeless tobacco: Former User     Tobacco comment: Quit chewing in early    Substance Use Topics     Alcohol use: No     Alcohol/week: 0.0 standard drinks     Comment: ~10 drinks per day for ten years, quit in 2014     Drug use: No       Honoring Choices - Health Care Directive Guide offered to patient at time of visit.    Health Maintenance Due   Topic Date Due     URINE DRUG SCREEN  1980     DEPRESSION ACTION PLAN  1980     MEDICARE ANNUAL WELLNESS VISIT  1998     PNEUMOCOCCAL IMMUNIZATION 19-64 HIGHEST RISK (2 of 3 - PCV13) 2015       Immunization History   Administered Date(s) Administered     Hepatitis B Immunity: Titer 10/14/2014     Historical DTP/aP 1980, 1980, 1981, 1982, 1986     Historical mumps 1981     Influenza (IIV3) PF 10/13/2009, 2013, 11/15/2013, 10/02/2016     Influenza Vaccine IM > 6 months Valent IIV4 10/18/2017, 10/16/2018, 2019     MMR 1981, 1989     Pneumococcal 23 valent 2014     Poliovirus, inactivated (IPV) 1980, 1980, 1982, 1986     Rubella 1981     TD (ADULT, 7+) 1995     TDAP Vaccine  (Boostrix) 07/12/2016       No results found for: PAP      Recent Labs   Lab Test 03/04/20  1113 02/06/20  1141 02/04/20  1110 01/07/20  1122  05/10/19  1628  09/04/18  1133  05/12/16  0336  10/14/14  0708   A1C  --   --   --   --   --   --   --   --   --  5.1  --   --    LDL  --   --   --   --   --   --   --  158*  --   --   --  120   HDL  --   --   --   --   --   --   --  40  --   --   --  55   TRIG  --   --   --   --   --   --   --  113  --   --   --  99   ALT 34  --  58 38   < >  --    < >  --    < > 476*   < > 20   CR 1.62*  --  1.56* 1.55*   < > 1.49*   < > 1.30*   < > 3.11*   < > 5.24*   GFRESTIMATED 52*  --  55* 55*   < > 58*   < > 62   < > 23*   < > 13*   GFRESTBLACK 61  --  64 64   < > 68   < > 75   < > 28*   < > 15*   ALBUMIN 3.9  --  3.9 4.1   < >  --    < >  --    < > 2.5*   < > 2.8*   POTASSIUM 4.4  --  4.7 4.3   < > 4.2   < > 4.4   < > 4.0   < > 2.8*   TSH  --  0.84  --   --   --  0.50   < >  --    < >  --   --  3.95    < > = values in this interval not displayed.       PHQ-2 ( 1999 Pfizer) 4/2/2020 5/10/2019   Q1: Little interest or pleasure in doing things 0 0   Q2: Feeling down, depressed or hopeless 1 0   PHQ-2 Score 1 0   Q1: Little interest or pleasure in doing things - -   Q2: Feeling down, depressed or hopeless - -   PHQ-2 Score - -       PHQ-9 SCORE 9/6/2019 4/2/2020   PHQ-9 Total Score 1 3       DEBBIE-7 SCORE 2/8/2019 1/23/2020 4/2/2020   Total Score 6 (mild anxiety) 4 (minimal anxiety) -   Total Score 6 4 3       No flowsheet data found.    Mariana Hernandez CMA  April 2, 2020 11:08 AM

## 2020-04-02 NOTE — PROGRESS NOTES
"Cricket Chen is a 39 year old male who is being evaluated via a billable video visit.      The patient has been notified of following:     \"This video visit will be conducted via a call between you and your physician/provider. We have found that certain health care needs can be provided without the need for an in-person physical exam.  This service lets us provide the care you need with a video conversation.  If a prescription is necessary we can send it directly to your pharmacy.  If lab work is needed we can place an order for that and you can then stop by our lab to have the test done at a later time.    If during the course of the call the physician/provider feels a video visit is not appropriate, you will not be charged for this service.\"     Patient has given verbal consent for Video visit? Yes    Patient would like the video invitation sent by: Send to e-mail at: [email or web address]        Subjective     Cricket Chen is a 39 year old male who presents to clinic today for the following health issues:    HPI     Isidro is feeling like he might need some medication for his moods.  He is in a long distance relationship with a woman in [place], he has not seen her for almost a year and he has been in the relationship for almost that length of time as well.  She was supposed to come to [place] 2 weeks ago, that changed due to the COVID issues.  He feels that he is more angry and frustrated, more insecure, having more issues with jealousy.      Isidro uses Buspar and hydroxyzine daily, he is aware that there may be some issues with those meds.    Isidro has spoken with Krystina, his PCP about this, he will follow up with her in the near future.      Video Start Time: 1303    Patient Active Problem List   Diagnosis     Atrial flutter (H)     Complete transposition of great vessels     Esophageal varices (H)     Insomnia     Alcoholic hepatitis     History of alcohol abuse     History of transposition of great vessels     " Depression     Thrombocytopenia (H)     Pain medication agreement     [facility]     Liver replaced by transplant (H)     Kidney replaced by transplant     Immunosuppressed status (H)     Hypomagnesemia     Secondary renal hyperparathyroidism (H)     Chronic pain syndrome     Pain management contract signed     Aftercare following organ transplant     Post-transplant lymphoproliferative disorder (H)     Papillary thyroid carcinoma (H)     Advance directive discussed with patient     Acute alcoholic liver disease     Alcohol dependence (H)     Encounter for palliative care     Hypertension secondary to other renal disorders     Atrial fibrillation (H)     Postoperative hypothyroidism     Arm mass, right     Dermatitis, seborrheic     Chest pain     Shortness of breath     Other ill-defined heart diseases     Past Surgical History:   Procedure Laterality Date     ANESTHESIA CARDIOVERSION N/A 3/7/2018    Procedure: ANESTHESIA CARDIOVERSION;  Cardioversion;  Surgeon: GENERIC ANESTHESIA PROVIDER;  Location:  OR     BENCH LIVER N/A 5/10/2016    Procedure: BENCH LIVER;  Surgeon: Ricky Deshpande MD;  [facility]     BIOPSY LYMPH NODE CERVICAL Right 1/26/2017    Procedure: BIOPSY LYMPH NODE CERVICAL;  Surgeon: Beka Soto MD;  Location:  OR     CARDIAC SURGERY  9-10-80     CREATE FISTULA ARTERIOVENOUS UPPER EXTREMITY Right 9/16/2014    Procedure: CREATE FISTULA ARTERIOVENOUS UPPER EXTREMITY;  Surgeon: Padmaja Eaton MD;  Location:  OR     CV RIGHT HEART CATH N/A 4/19/2019    Procedure: CV RIGHT HEART CATH;  Surgeon: Sabi Wiggins MD;  Location:  HEART CARDIAC CATH LAB     CYSTOSCOPY, REMOVE STENT(S), COMBINED Right 6/22/2016    Procedure: COMBINED CYSTOSCOPY, REMOVE STENT(S);  Surgeon: Ricky Deshpande MD;  Location:  OR     EMBOLECTOMY UPPER EXTREMITY Right 8/17/2018    Procedure: EMBOLECTOMY UPPER EXTREMITY;  Repair Right  Upper Arm Pseudo Aneursym ;  Surgeon: John Payton MD;  [facility]     ENT SURGERY       ESOPHAGOSCOPY, GASTROSCOPY, DUODENOSCOPY (EGD), COMBINED  5/30/2014    Procedure: COMBINED ESOPHAGOSCOPY, GASTROSCOPY, DUODENOSCOPY (EGD);  Surgeon: Guillaume Bautista MD;  Location:  GI     ESOPHAGOSCOPY, GASTROSCOPY, DUODENOSCOPY (EGD), COMBINED  9/30/14     ESOPHAGOSCOPY, GASTROSCOPY, DUODENOSCOPY (EGD), COMBINED Left 3/12/2015    Procedure: COMBINED ESOPHAGOSCOPY, GASTROSCOPY, DUODENOSCOPY (EGD);  Surgeon: Laureen Galvan MD;  Location:  GI     ESOPHAGOSCOPY, GASTROSCOPY, DUODENOSCOPY (EGD), COMBINED N/A 4/21/2016    Procedure: COMBINED ESOPHAGOSCOPY, GASTROSCOPY, DUODENOSCOPY (EGD);  Surgeon: Laureen Galvan MD;  Location:  GI     ESOPHAGOSCOPY, GASTROSCOPY, DUODENOSCOPY (EGD), COMBINED N/A 7/21/2016    Procedure: COMBINED ESOPHAGOSCOPY, GASTROSCOPY, DUODENOSCOPY (EGD);  Surgeon: Laureen Galvan MD;  Location:  GI     HC OR CATH ABLATION NON-CARDIAC ENDOVASCULAR  1990,2002    SVT     INSERT PORT VASCULAR ACCESS N/A 4/3/2017    Procedure: INSERT PORT VASCULAR ACCESS;  Surgeon: Rajendra Jacques PA-C;  [facility]     IR PORT REMOVAL LEFT  1/22/2019     LIGATE FISTULA ARTERIOVENOUS UPPER EXTREMITY Right 11/7/2017    Procedure: LIGATE FISTULA ARTERIOVENOUS UPPER EXTREMITY;  Revision of Right Arm Arteriovenous Fistula ;  Surgeon: Padmaja Eaton MD;  [facility]     PERCUTANEOUS BIOPSY KIDNEY Right 9/26/2018    Procedure: PERCUTANEOUS BIOPSY KIDNEY;  Right Kidney Biopsy;  Surgeon: Yuval Khan MD;  [facility]     PERCUTANEOUS BIOPSY KIDNEY Right 10/30/2019    Procedure: Right Side Kidney Biopsy;  Surgeon: Jake Sidhu MD;  [facility]     PICC INSERTION  5/30/14    PICC line placement 5/30/14; Removal 6/9/2014     REMOVE PORT VASCULAR ACCESS Right 1/22/2019    Procedure: Right chest Port Removal;  Surgeon: Erasmo Ziegler  RASHI Nicole;  Location:  OR     THORACIC SURGERY  1980    Transposition great arteries, repaired at 8 months     THYROIDECTOMY Right 2017    Procedure: THYROIDECTOMY;  Bilateral Total Thyroidectomy ;  Surgeon: Beka Soot MD;  Location:  OR     TRANSPLANT KIDNEY RECIPIENT  DONOR  5/10/2016    Procedure: TRANSPLANT KIDNEY RECIPIENT  DONOR;  Surgeon: Ricky Deshpande MD;  Location:  OR     TRANSPLANT LIVER RECIPIENT  DONOR N/A 5/10/2016    Procedure: TRANSPLANT LIVER RECIPIENT  DONOR;  Surgeon: Ricky Deshpande MD;  Location:  OR       Social History     Tobacco Use     Smoking status: Former Smoker     Packs/day: 0.33     Years: 3.00     Pack years: 0.99     Types: Cigarettes     Start date: 1997     Last attempt to quit: 2000     Years since quittin.5     Smokeless tobacco: Former User     Tobacco comment: Quit chewing in early    Substance Use Topics     Alcohol use: No     Alcohol/week: 0.0 standard drinks     Comment: ~10 drinks per day for ten years, quit in 2014     Family History   Problem Relation Age of Onset     No Known Problems Brother      Arthritis Father      Hypertension Father      Hypertension Mother      Anxiety Disorder Mother      Hypertension Sister      No Known Problems Maternal Grandmother      No Known Problems Maternal Grandfather      No Known Problems Paternal Grandmother      No Known Problems Sister      No Known Problems Paternal Grandfather      Alcohol/Drug No family hx of      Gastrointestinal Disease No family hx of         no fam hx of liver disease or liver cancer         Current Outpatient Medications   Medication Sig Dispense Refill     apixaban ANTICOAGULANT (ELIQUIS ANTICOAGULANT) 5 MG tablet Take 1 tablet (5 mg) by mouth 2 times daily 180 tablet 2     busPIRone (BUSPAR) 15 MG tablet Take one tablet (15 mg) twice daily for anxiety.Call clinic to schedule follow up appointment. 180 tablet 1      "hydrOXYzine (ATARAX) 50 MG tablet Take 1-2 tablets ( mg) by mouth nightly as needed for anxiety 90 tablet 1     ketoconazole (NIZORAL) 2 % cream Twice daily to areas of rash on face 60 g 1     ketoconazole (NIZORAL) 2 % shampoo Use as a foaming face wash in shower daily 120 mL 3     levothyroxine (SYNTHROID/LEVOTHROID) 137 MCG tablet Take 1 tablet by mouth Monday - Saturday  and 1.5 tablets on Sunday 96 tablet 3     lisinopril (PRINIVIL/ZESTRIL) 10 MG tablet Take 1 tablet (10 mg) by mouth daily 90 tablet 1     magnesium oxide (MAG-OX) 400 MG tablet TAKE ONE TABLET BY MOUTH TWICE A  tablet 11     metoprolol succinate ER (TOPROL-XL) 25 MG 24 hr tablet Take 1 tablet (25 mg) by mouth daily 90 tablet 3     mycophenolate (GENERIC EQUIVALENT) 250 MG capsule Take 2 capsules (500 mg) by mouth 2 times daily 120 capsule 11     sulfamethoxazole-trimethoprim (BACTRIM/SEPTRA) 400-80 MG tablet Take 1 tablet by mouth daily 30 tablet 11     tacrolimus (GENERIC EQUIVALENT) 0.5 MG capsule Take 1 capsule (0.5 mg) by mouth every 12 hours *total dose 1.5 mg every 12 hours 60 capsule 11     tacrolimus (GENERIC EQUIVALENT) 1 MG capsule Take 1 capsule (1 mg) by mouth every 12 hours *total dose 1.5 mg every 12 hours 60 capsule 11     desonide (DESOWEN) 0.05 % ointment Apply topically 2 times daily (Patient not taking: Reported on 2/6/2020) 15 g 3     Allergies   Allergen Reactions     Hydromorphone Itching     Reviewed and updated as needed this visit by Provider    Review of Systems   ROS COMP: Constitutional, HEENT, cardiovascular, pulmonary, GI, musculoskeletal, neuro, skin, endocrine and psych systems are negative, except as otherwise noted.      Objective    There were no vitals taken for this visit.  Estimated body mass index is 31.14 kg/m  as calculated from the following:    Height as of 3/13/20: 1.778 m (5' 10\").    Weight as of 3/13/20: 98.4 kg (217 lb).  Physical Exam   GENERAL: healthy, alert and no " "distress    Diagnostic Test Results:  Labs reviewed in Epic:  CREATININE:  1.62  GFR:  52      Assessment & Plan     (R45.86) Mood disturbance  (primary encounter diagnosis)  Comment: Discussion of medication, possibly serotonin specific reuptake inhibitor.  He is on Buspar and hydroxyzine now and will continue.     Plan: Contacted a pharmD who looked at med interactions and issues with SSRIs, and with other mental health meds.  I feel that we should continue to look at appropriate medications - perhaps discuss with cardiology.      I will let Isidro know when the investigation is done and we can make a recommendation.     BMI:   Estimated body mass index is 31.14 kg/m  as calculated from the following:    Height as of 3/13/20: 1.778 m (5' 10\").    Weight as of 3/13/20: 98.4 kg (217 lb).     Follow up video visit in 2 weeks    Loretta Rosas NP  [facility]      Video-Visit Details    Type of service:  Video Visit    Video End Time (time video stopped): 1324    Originating Location (pt. Location): Home    Distant Location (provider location):  [facility]     Mode of Communication:  Video Conference via [email or web address]    No follow-ups on file.       Loretta Rosas NP    "

## 2020-04-03 ASSESSMENT — ANXIETY QUESTIONNAIRES: GAD7 TOTAL SCORE: 3

## 2020-04-07 DIAGNOSIS — Z94.4 LIVER REPLACED BY TRANSPLANT (H): ICD-10-CM

## 2020-04-07 DIAGNOSIS — Z94.0 KIDNEY TRANSPLANTED: ICD-10-CM

## 2020-04-07 LAB
ALBUMIN SERPL-MCNC: 4.2 G/DL (ref 3.4–5)
ALP SERPL-CCNC: 69 U/L (ref 40–150)
ALT SERPL W P-5'-P-CCNC: 28 U/L (ref 0–70)
ANION GAP SERPL CALCULATED.3IONS-SCNC: 3 MMOL/L (ref 3–14)
AST SERPL W P-5'-P-CCNC: 16 U/L (ref 0–45)
BILIRUB DIRECT SERPL-MCNC: 0.1 MG/DL (ref 0–0.2)
BILIRUB SERPL-MCNC: 0.4 MG/DL (ref 0.2–1.3)
BUN SERPL-MCNC: 24 MG/DL (ref 7–30)
CALCIUM SERPL-MCNC: 8.8 MG/DL (ref 8.5–10.1)
CHLORIDE SERPL-SCNC: 108 MMOL/L (ref 94–109)
CO2 SERPL-SCNC: 27 MMOL/L (ref 20–32)
CREAT SERPL-MCNC: 1.63 MG/DL (ref 0.66–1.25)
ERYTHROCYTE [DISTWIDTH] IN BLOOD BY AUTOMATED COUNT: 12.9 % (ref 10–15)
GFR SERPL CREATININE-BSD FRML MDRD: 52 ML/MIN/{1.73_M2}
GLUCOSE SERPL-MCNC: 106 MG/DL (ref 70–99)
HCT VFR BLD AUTO: 43.9 % (ref 40–53)
HGB BLD-MCNC: 14.9 G/DL (ref 13.3–17.7)
MAGNESIUM SERPL-MCNC: 2.1 MG/DL (ref 1.6–2.3)
MCH RBC QN AUTO: 28.9 PG (ref 26.5–33)
MCHC RBC AUTO-ENTMCNC: 33.9 G/DL (ref 31.5–36.5)
MCV RBC AUTO: 85 FL (ref 78–100)
PLATELET # BLD AUTO: 156 10E9/L (ref 150–450)
POTASSIUM SERPL-SCNC: 4.5 MMOL/L (ref 3.4–5.3)
PROT SERPL-MCNC: 7.1 G/DL (ref 6.8–8.8)
RBC # BLD AUTO: 5.16 10E12/L (ref 4.4–5.9)
SODIUM SERPL-SCNC: 138 MMOL/L (ref 133–144)
WBC # BLD AUTO: 5.2 10E9/L (ref 4–11)

## 2020-04-07 PROCEDURE — 83735 ASSAY OF MAGNESIUM: CPT | Performed by: INTERNAL MEDICINE

## 2020-04-07 PROCEDURE — 80076 HEPATIC FUNCTION PANEL: CPT | Performed by: INTERNAL MEDICINE

## 2020-04-07 PROCEDURE — 36415 COLL VENOUS BLD VENIPUNCTURE: CPT | Performed by: INTERNAL MEDICINE

## 2020-04-07 PROCEDURE — 80048 BASIC METABOLIC PNL TOTAL CA: CPT | Performed by: INTERNAL MEDICINE

## 2020-04-07 PROCEDURE — 80197 ASSAY OF TACROLIMUS: CPT | Performed by: INTERNAL MEDICINE

## 2020-04-07 PROCEDURE — 85027 COMPLETE CBC AUTOMATED: CPT | Performed by: INTERNAL MEDICINE

## 2020-04-08 DIAGNOSIS — Z94.4 LIVER REPLACED BY TRANSPLANT (H): ICD-10-CM

## 2020-04-08 DIAGNOSIS — Z94.0 S/P KIDNEY TRANSPLANT: ICD-10-CM

## 2020-04-08 LAB
TACROLIMUS BLD-MCNC: 5.9 UG/L (ref 5–15)
TME LAST DOSE: NORMAL H

## 2020-04-08 RX ORDER — TACROLIMUS 1 MG/1
1 CAPSULE ORAL EVERY 12 HOURS
Qty: 60 CAPSULE | Refills: 11 | Status: SHIPPED | OUTPATIENT
Start: 2020-04-08 | End: 2021-04-05

## 2020-04-08 RX ORDER — TACROLIMUS 0.5 MG/1
0.5 CAPSULE ORAL EVERY MORNING
Qty: 90 CAPSULE | Refills: 3 | Status: SHIPPED | OUTPATIENT
Start: 2020-04-08 | End: 2021-04-05

## 2020-04-08 NOTE — TELEPHONE ENCOUNTER
ISSUE:   Tacrolimus IR level 5.9 on 4/7/2020, goal 5 dose 1.5 mg every 12 hours.    PLAN:   Please call patient and confirm this was an accurate 12-hour trough. Verify Tacrolimus IR dose 1.5 mg every 12 hours. Confirm no new medications or illness. Confirm no missed doses. If accurate trough and accurate dose, decrease Tacrolimus IR dose to 1.5 mg AM and 1 mg PM and repeat labs as previously scheduled.    OUTCOME:   Spoke with patient, they confirm accurate trough level and current dose 1.5 mg BID. Patient confirmed dose change to 1.5 mg am, 1 mg pm and to repeat labs in 2 months. Orders sent to preferred pharmacy for dose change and lab for repeat labs. Patient voiced understanding of plan.     Michelle Whittaker LPN

## 2020-04-12 NOTE — PROGRESS NOTES
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  Pt reports that since last clinic viisithe has been feeling well.  He denies any chest pain or pressure, sob/lucia, orthopnea, pnd, syncope/presyncope, suzette or change in exercise tolerance.  He does note some rare palpitations but these are brief induration with no significant associated symptoms. He denies any problems with his medications and reports compliance.         Past Medical History:   Diagnosis Date     Alcohol abuse     Last drink in Mid-April 2014     Anemia in ESRD (end-stage renal disease) (H)      Anxiety 2008     Atrial flutter (H) 2017     Cirrhosis (H)     S/P liver transplant     Depression      History of blood transfusion      History of transposition of great vessels     atrial switch at age 8 months old     Hypertension      Liver transplant recipient (H)     2016     Papillary thyroid carcinoma (H)      Pneumonia 11-15-14     Renal transplant recipient     2016     Varices, esophageal (H)        Past Surgical History:   Procedure Laterality Date     ANESTHESIA CARDIOVERSION N/A 3/7/2018    Procedure: ANESTHESIA CARDIOVERSION;  Cardioversion;  Surgeon: GENERIC ANESTHESIA PROVIDER;  Location:  OR     BENCH LIVER N/A 5/10/2016    Procedure: BENCH LIVER;  Surgeon: Ricky Deshpande MD;  Location:  OR     BIOPSY LYMPH NODE CERVICAL Right 1/26/2017    Procedure: BIOPSY LYMPH NODE CERVICAL;  Surgeon: Beka Soto MD;  Location:  OR     CARDIAC SURGERY  9-10-80     CREATE FISTULA ARTERIOVENOUS UPPER EXTREMITY Right 9/16/2014    Procedure: CREATE FISTULA ARTERIOVENOUS UPPER  EXTREMITY;  Surgeon: Padmaja Eaton MD;  Location: [facility] OR     CV RIGHT HEART CATH N/A 4/19/2019    Procedure: CV RIGHT HEART CATH;  Surgeon: Sabi Wiggins MD;  Location:  HEART CARDIAC CATH LAB     CYSTOSCOPY, REMOVE STENT(S), COMBINED Right 6/22/2016    Procedure: COMBINED CYSTOSCOPY, REMOVE STENT(S);  Surgeon: Ricky Deshpande MD;  [facility]: [facility] OR     EMBOLECTOMY UPPER EXTREMITY Right 8/17/2018    Procedure: EMBOLECTOMY UPPER EXTREMITY;  Repair Right Upper Arm Pseudo Aneursym ;  Surgeon: John Payton MD;  Location: [facility] OR     ENT SURGERY       ESOPHAGOSCOPY, GASTROSCOPY, DUODENOSCOPY (EGD), COMBINED  5/30/2014    Procedure: COMBINED ESOPHAGOSCOPY, GASTROSCOPY, DUODENOSCOPY (EGD);  Surgeon: Guillaume Bautista MD;  Location:  GI     ESOPHAGOSCOPY, GASTROSCOPY, DUODENOSCOPY (EGD), COMBINED  9/30/14     ESOPHAGOSCOPY, GASTROSCOPY, DUODENOSCOPY (EGD), COMBINED Left 3/12/2015    Procedure: COMBINED ESOPHAGOSCOPY, GASTROSCOPY, DUODENOSCOPY (EGD);  Surgeon: Laureen Galvan MD;  Location:  GI     ESOPHAGOSCOPY, GASTROSCOPY, DUODENOSCOPY (EGD), COMBINED N/A 4/21/2016    Procedure: COMBINED ESOPHAGOSCOPY, GASTROSCOPY, DUODENOSCOPY (EGD);  Surgeon: Laureen Galvan MD;  Location:  GI     ESOPHAGOSCOPY, GASTROSCOPY, DUODENOSCOPY (EGD), COMBINED N/A 7/21/2016    Procedure: COMBINED ESOPHAGOSCOPY, GASTROSCOPY, DUODENOSCOPY (EGD);  Surgeon: Laureen Galvan MD;  Location:  GI     HC OR CATH ABLATION NON-CARDIAC ENDOVASCULAR  1990,2002    SVT     INSERT PORT VASCULAR ACCESS N/A 4/3/2017    Procedure: INSERT PORT VASCULAR ACCESS;  Surgeon: Rajendra Jacques PA-C;  [facility]     IR PORT REMOVAL LEFT  1/22/2019     LIGATE FISTULA ARTERIOVENOUS UPPER EXTREMITY Right 11/7/2017    Procedure: LIGATE FISTULA ARTERIOVENOUS UPPER EXTREMITY;  Revision of Right Arm Arteriovenous Fistula ;  Surgeon: Padmaja Eaton MD;  Location: [facility] OR     PERCUTANEOUS BIOPSY KIDNEY  Right 2018    Procedure: PERCUTANEOUS BIOPSY KIDNEY;  Right Kidney Biopsy;  Surgeon: Yuval Khan MD;  Location:  OR     PERCUTANEOUS BIOPSY KIDNEY Right 10/30/2019    Procedure: Right Side Kidney Biopsy;  Surgeon: Jake Sidhu MD;  Location:  OR     PICC INSERTION  14    PICC line placement 14; Removal 2014     REMOVE PORT VASCULAR ACCESS Right 2019    Procedure: Right chest Port Removal;  Surgeon: Erasmo Ziegler PA-C;  Location:  OR     THORACIC SURGERY  1980    Transposition great arteries, repaired at 8 months     THYROIDECTOMY Right 2017    Procedure: THYROIDECTOMY;  Bilateral Total Thyroidectomy ;  Surgeon: Beka Soto MD;  [facility]: [facility]     TRANSPLANT KIDNEY RECIPIENT  DONOR  5/10/2016    Procedure: TRANSPLANT KIDNEY RECIPIENT  DONOR;  Surgeon: Ricky Deshpande MD;  [facility]: [facility]     TRANSPLANT LIVER RECIPIENT  DONOR N/A 5/10/2016    Procedure: TRANSPLANT LIVER RECIPIENT  DONOR;  Surgeon: Ricky Deshpande MD;  Location: [facility] OR   As above, including HPI    Family History   Problem Relation Age of Onset     No Known Problems Brother      Arthritis Father      Hypertension Father      Hypertension Mother      Anxiety Disorder Mother      Hypertension Sister      No Known Problems Maternal Grandmother      No Known Problems Maternal Grandfather      No Known Problems Paternal Grandmother      No Known Problems Sister      No Known Problems Paternal Grandfather      Alcohol/Drug No family hx of      Gastrointestinal Disease No family hx of         no fam hx of liver disease or liver cancer       Meds  apixaban ANTICOAGULANT (ELIQUIS ANTICOAGULANT) 5 MG tablet, Take 1 tablet (5 mg) by mouth 2 times daily  busPIRone (BUSPAR) 15 MG tablet, Take one tablet (15 mg) twice daily for anxiety.Call clinic to schedule follow up appointment.  hydrOXYzine (ATARAX) 50 MG tablet, Take 1-2 tablets ( mg) by mouth  "nightly as needed for anxiety  ketoconazole (NIZORAL) 2 % cream, Twice daily to areas of rash on face  ketoconazole (NIZORAL) 2 % shampoo, Use as a foaming face wash in shower daily  levothyroxine (SYNTHROID/LEVOTHROID) 137 MCG tablet, Take 1 tablet by mouth Monday - Saturday  and 1.5 tablets on Sunday  lisinopril (PRINIVIL/ZESTRIL) 10 MG tablet, Take 1 tablet (10 mg) by mouth daily  magnesium oxide (MAG-OX) 400 MG tablet, TAKE ONE TABLET BY MOUTH TWICE A DAY  metoprolol succinate ER (TOPROL-XL) 25 MG 24 hr tablet, Take 1 tablet (25 mg) by mouth daily  mycophenolate (GENERIC EQUIVALENT) 250 MG capsule, Take 2 capsules (500 mg) by mouth 2 times daily  sulfamethoxazole-trimethoprim (BACTRIM/SEPTRA) 400-80 MG tablet, Take 1 tablet by mouth daily  desonide (DESOWEN) 0.05 % ointment, Apply topically 2 times daily (Patient not taking: Reported on 2/6/2020)    No current facility-administered medications on file prior to visit.       Allergies   Allergen Reactions     Hydromorphone Itching       ROS: 12 point ROS neg other than the symptoms noted above in the HPI.      /86 (BP Location: Left arm, Patient Position: Chair, Cuff Size: Adult Regular)   Pulse 71   Ht 1.778 m (5' 10\")   Wt 98.4 kg (217 lb)   SpO2 99%   BMI 31.14 kg/m    General: appears comfortable, alert and articulate  Head: normocephalic, atraumatic  Eyes: anicteric sclera, EOMI  Neck: no adenopathy or masses, 2+ carotids without bruits  Orophyarynx: moist mucosa, no lesions, dentition intact  Heart: regular, normal S1, loud S2, no murmur, gallop, or rub, estimated JVP <10 cm  Lungs: clear, no rales or wheezing  Abdomen: soft, non-tender, bowel sounds present, no hepatomegaly  Extremities: no clubbing, cyanosis or edema  Neurological: normal speech and affect, no gross motor deficits    Labs:   Ref. Range 3/4/2020 11:13   Sodium Latest Ref Range: 133 - 144 mmol/L 139   Potassium Latest Ref Range: 3.4 - 5.3 mmol/L 4.4   Chloride Latest Ref Range: 94 " - 109 mmol/L 106   Carbon Dioxide Latest Ref Range: 20 - 32 mmol/L 26   Urea Nitrogen Latest Ref Range: 7 - 30 mg/dL 24   Creatinine Latest Ref Range: 0.66 - 1.25 mg/dL 1.62 (H)   GFR Estimate Latest Ref Range: >60 mL/min/1.73_m2 52 (L)   GFR Estimate If Black Latest Ref Range: >60 mL/min/1.73_m2 61   Calcium Latest Ref Range: 8.5 - 10.1 mg/dL 9.1   Anion Gap Latest Ref Range: 3 - 14 mmol/L 7   Albumin Latest Ref Range: 3.4 - 5.0 g/dL 3.9   Protein Total Latest Ref Range: 6.8 - 8.8 g/dL 7.1   Bilirubin Total Latest Ref Range: 0.2 - 1.3 mg/dL 0.4   Alkaline Phosphatase Latest Ref Range: 40 - 150 U/L 87   ALT Latest Ref Range: 0 - 70 U/L 34   AST Latest Ref Range: 0 - 45 U/L 22   Bilirubin Direct Latest Ref Range: 0.0 - 0.2 mg/dL 0.1   Glucose Latest Ref Range: 70 - 99 mg/dL 108 (H)   WBC Latest Ref Range: 4.0 - 11.0 10e9/L 5.5   Hemoglobin Latest Ref Range: 13.3 - 17.7 g/dL 14.9   Hematocrit Latest Ref Range: 40.0 - 53.0 % 44.4   Platelet Count Latest Ref Range: 150 - 450 10e9/L 152   RBC Count Latest Ref Range: 4.4 - 5.9 10e12/L 5.12   MCV Latest Ref Range: 78 - 100 fl 87   MCH Latest Ref Range: 26.5 - 33.0 pg 29.1   MCHC Latest Ref Range: 31.5 - 36.5 g/dL 33.6   RDW Latest Ref Range: 10.0 - 15.0 % 13.3     Cardiac MRI 3/9/2017  Great arteries with native atrioventricular   concordance and ventricular arterial discordance with the aorta anterior   to the pulmonary artery (D-transposition of the great arteries).  2. Status post interatrial baffle with the superior vena cava and inferior   vena cava baffled to the left (subpulmonic) ventricle without obstruction.    There is no evidence of a baffle leak.    3. Systemic right ventricle:  a. The right ventricle is hypertrophied.  b. Decreased systolic function with an ejection fraction of 33%.  c. Severe dilation of the right ventricle with an end-diastolic volume of   208 mL/m  (previously 188 mL/m ) and end-systolic volume of 140 mL/m    (previous 123 mL/m ).  4. The  right ventricle connects with the anterior aorta.  The coronary   artery origins are usual for transposition.  Left-sided aortic arch with   measurements above.  5. The pulmonary venous baffle to the right ventricle is widely patent.  6. The subpulmonary left ventricle has normal systolic function with   decreased myocardial mass.  The left ventricle connects to the pulmonary   artery, which is posterior to the aorta.  7. No significant change from previous cardiac MRI of 9/4/2012 except an   increased size in the indexed right ventricular diastolic and systolic   Volumes    Echo (3/30/18):   Patient after atrial switch operation for complete transposition of the great arteries. Technically difficult study due to poor acoustic windows. No baffle obstruction or leaks seen. There is moderate right ventricular enlargement. Single plane right ventricular EF 35-40 %. Normal left ventricular systolic function. No outflow obstruction. Mild tricuspid regurgitation.    EKG (3/30:18): Sinus rhythm, RAD, RBBB, prominent RV forces with repolarization abnormality.      Echo 1/17/19  Patient after atrial switch operation for complete transposition of the great  arteries. Technically difficult study due to poor acoustic windows. There is  moderate right ventricular enlargement. The right ventricular function is  qualitatively moderately depressed. Qualitivley the right ventricle function  is unchanged form the 3/30/2018 echo. Mild (1+) tricuspid valve insufficiency.  No obvious baffle obstruction or leaks seen. Qualitatively normal left  ventricular systolic function. Mild (1+) pulmonary valve insufficiency.  Limited visualization of the LPA suggests narrowing. RPA not seen.    Mercedes Feb 2019      CMR Report 3-   SUMMARY  Clinical history:39-year old male with a history of d-TGA (with baffle repair), small VSD (closed with a  stitch) and combined liver/renal transplant. He is known to have depressed systemic ventricle  function. CMR  to evaluate RV function.   Comparison CMR: 03/09/2017 ([facility])  SITUS: There is normal situs The liver is in the right upper quadrant and a single spleen in the left  upper quadrant.   CAVAE: There is a single SVC and a single IVC.both drain unobstructed into the sub-pulmonic ventricle via  baffle.  SVC baffle measures 1.6 x 1.0 cm. IVC baffle measures 1.5 x 1.4 cm. The intrahepatic segment of  the native IVC measures 13.9 x 5.3 mm at its narrowest point. There does not appear to be any baffle  obstruction or leakage.   PULMONARY VEINS: There are four pulmonary veins with two left-sided veins and two right-sided veins.They  drain in an unobstructed fashion via baffle to the systemic ventricle.   ATRIOVENTRICULAR CONNECTION: The atrioventricular connection is discordant. There is mild-moderate  regurgitation of the systemic atrioventricular (tricuspid) valve. There is mild FARHANA of the sub-pulmonic AV  valve (mitral) without septal contact or obstruction.   VENTRICLES: There is levocardia. The systemic (morphologic right) ventricle is moderately enlarged with  severe hypertrophy. The global systolic function of the systemic ventricle is severely reduced. The  systemic ventricle's EF is 27%. The subpulmonic (morphologic left) ventricle is smallï cavity size. The  subpulmonic ventricle exhibits wall thinning and a pattern of fatty metaplasia with enhancement on LGE  imaging in the mid-anterolateral, mid anteroseptal, apical septal, and apical lateral segments. The global  systolic function of the subpulmonic ventricle is normal. The subpulmonic ventricle's EF is 57%. There are  no regional wall motion abnormalities. The VSD repair is visualized in the basal septum and is intact.  There is no evidence of intracardiac shunting (Qp/Qs 1.0).  VENTRICULOARTERIAL CONNECTION: The ventriculoarterial connection is discordant.There is no significant  valvular disease.   AORTA AND SUPRA-AORTIC  VESSELS: The aortic arch is left-sided with normal branching of the vessels.   PULMONARY ARTERY: The main pulmonary artery is mildly enlarged. The branch pulmonary arteries are normal in  size.   CONCLUSIONS: d-TGA with atrial baffle repair.  The systemic ventricle is moderately enlarged with severely  reduced function, EF 27%. The subpulmonic ventricle is small in size with normal function, EF 57%. Normal  baffles without any obstruction, thrombus or leakage. There is mild-moderate regurgitation of the systemic  atrioventricular (tricuspid) valve. There is no residual intracardiac shunting (Qp/Qs 1.0).   The subpulmonic ventricle exhibits extensive fatty metaplasia in the mid and apical segments of unclear  significance. Narrowing of intra-hepatic IVC of unclear significance.   Compared to the prior examination dated 03/09/2017 the systemic ventricle's function appears similar  visually. The fatty metaplasia of sub-pulmonic ventricle and narrowing of intra-hepatic IVC  is unchanged  from prior examinations.       Assessment and Plan:  38 yo M with history of d - transposition of the great vessels s/p baffle repair in 1981 with small VSD stitch closure, atrial flutter s/p ablation and recent cardioversion, with stably depressed systemic RV function, s/p liver and kidney transplant for EtOH abuse, post-transplant lymphoproliferative disorder, papillary thyroid cancer s/p thryoidectomy presents for ongoing evaluation and management.    1.  TGA s/p atrial switch now with stably depressed systemic ventricular function:  Although ventricular function is depressed this has been stable for the past several years as confirmed by recent MRI.  In regard to heart failure medical regimen, his systemic ventricle is an anatomic right ventricle and there is no proven therapies to improve morbidity or mortality in right ventricular failure and especially systemic right ventricular failure.  As he is currently not experiencing any  symptoms and does not have any evidence of volume overload on today's exam, thus we will not change his medication regimen at this point in time - will continue current doses of metoprolol xl and lisinopril.  Encouraged patient to begin regular aerobic exercise aiming for at least 150 minutes of moderate physical activity or 75 minutes of vigorous physical activity - or an equal combination of both - each week. and follow low-salt, heart healthy diet.  2.  H/o atrial flutter s/p ablation: 2 week Zio monitor today.  Seen by congenital EP today.  Please see their note for detailed findings and plan.    Follow-up:  One year with labs, echo ad EKG.  Will be happy to see sooner if change in clinical status or new questions/concerns arise.      Francesca Alba MD  Section Head - Advanced Heart Failure, Transplantation and Mechanical Circulatory Support  Director - [facility]  Associate Professor of Medicine, [facility]          Answers for HPI/ROS submitted by the patient on 3/13/2020   General Symptoms: No  Skin Symptoms: No  HENT Symptoms: No  EYE SYMPTOMS: No  HEART SYMPTOMS: Yes  LUNG SYMPTOMS: No  INTESTINAL SYMPTOMS: No  URINARY SYMPTOMS: No  REPRODUCTIVE SYMPTOMS: No  SKELETAL SYMPTOMS: No  BLOOD SYMPTOMS: No  NERVOUS SYSTEM SYMPTOMS: No  MENTAL HEALTH SYMPTOMS: No  Chest pain or pressure: No  Fast or irregular heartbeat: Yes  Pain in legs with walking: No  Trouble breathing while lying down: No  Fingers or toes appear blue: No  High blood pressure: No  Low blood pressure: No  Fainting: No  Murmurs: No  Pacemaker: No  Varicose veins: No  Edema or swelling: No  Wake up at night with shortness of breath: No  Light-headedness: No  Exercise intolerance: No

## 2020-04-20 ENCOUNTER — TELEPHONE (OUTPATIENT)
Dept: FAMILY MEDICINE | Facility: CLINIC | Age: 40
End: 2020-04-20

## 2020-04-20 NOTE — TELEPHONE ENCOUNTER
Pt. Calls the clinic today wanting to speak with Loretta about an Anxiety medication. He states he contacted his cardiologist and they have not received anything from Loretta to date regarding any questions on medications.     He would like a call back from Loretta Rosas today to discuss this.     [phone].    Mariana Hernandez CMA

## 2020-04-22 DIAGNOSIS — F41.9 ANXIETY: ICD-10-CM

## 2020-04-23 RX ORDER — HYDROXYZINE HYDROCHLORIDE 50 MG/1
TABLET, FILM COATED ORAL
Qty: 90 TABLET | Refills: 0 | Status: SHIPPED | OUTPATIENT
Start: 2020-04-23 | End: 2020-07-02

## 2020-04-23 NOTE — TELEPHONE ENCOUNTER
hydrOXYzine (ATARAX) 50 MG tablet       Last Written Prescription Date:  1-23-20  Last Fill Quantity: 90,   # refills: 1  Last Office Visit : 4-2-20 (RTC 2 Weeks)  Future Office visit:  none    Routing refill request to provider for review/approval because:  No RTC made,  My chart messages regarding new med for anxiety-   ? RF, ? increase dispensing amount      Scheduling has been notified to contact the pt for appointment.

## 2020-05-04 ENCOUNTER — TELEPHONE (OUTPATIENT)
Dept: LAB | Facility: CLINIC | Age: 40
End: 2020-05-04

## 2020-05-04 NOTE — TELEPHONE ENCOUNTER
.Left message for patient to call in regards to 24 hour pre-appointment COVID screening. Patient has an appointment at [facility] on 5/05/2020.  Please ask infection screening questions and update appointment notes on [facility] lab schedule.

## 2020-05-05 ENCOUNTER — RESULTS ONLY (OUTPATIENT)
Dept: OTHER | Facility: CLINIC | Age: 40
End: 2020-05-05

## 2020-05-05 DIAGNOSIS — Z94.0 KIDNEY TRANSPLANTED: ICD-10-CM

## 2020-05-05 DIAGNOSIS — Z94.4 LIVER REPLACED BY TRANSPLANT (H): ICD-10-CM

## 2020-05-05 DIAGNOSIS — I48.92 ATRIAL FLUTTER, UNSPECIFIED TYPE (H): ICD-10-CM

## 2020-05-05 LAB
ALBUMIN SERPL-MCNC: 4.2 G/DL (ref 3.4–5)
ALP SERPL-CCNC: 61 U/L (ref 40–150)
ALT SERPL W P-5'-P-CCNC: 20 U/L (ref 0–70)
ANION GAP SERPL CALCULATED.3IONS-SCNC: 5 MMOL/L (ref 3–14)
AST SERPL W P-5'-P-CCNC: 14 U/L (ref 0–45)
BILIRUB DIRECT SERPL-MCNC: 0.1 MG/DL (ref 0–0.2)
BILIRUB SERPL-MCNC: 0.3 MG/DL (ref 0.2–1.3)
BUN SERPL-MCNC: 18 MG/DL (ref 7–30)
CALCIUM SERPL-MCNC: 9.3 MG/DL (ref 8.5–10.1)
CHLORIDE SERPL-SCNC: 108 MMOL/L (ref 94–109)
CO2 SERPL-SCNC: 27 MMOL/L (ref 20–32)
CREAT SERPL-MCNC: 1.51 MG/DL (ref 0.66–1.25)
CREAT UR-MCNC: 166 MG/DL
ERYTHROCYTE [DISTWIDTH] IN BLOOD BY AUTOMATED COUNT: 13 % (ref 10–15)
GFR SERPL CREATININE-BSD FRML MDRD: 57 ML/MIN/{1.73_M2}
GLUCOSE SERPL-MCNC: 108 MG/DL (ref 70–99)
HCT VFR BLD AUTO: 46.6 % (ref 40–53)
HGB BLD-MCNC: 15.6 G/DL (ref 13.3–17.7)
MAGNESIUM SERPL-MCNC: 1.8 MG/DL (ref 1.6–2.3)
MCH RBC QN AUTO: 29.3 PG (ref 26.5–33)
MCHC RBC AUTO-ENTMCNC: 33.5 G/DL (ref 31.5–36.5)
MCV RBC AUTO: 88 FL (ref 78–100)
PLATELET # BLD AUTO: 160 10E9/L (ref 150–450)
POTASSIUM SERPL-SCNC: 4.3 MMOL/L (ref 3.4–5.3)
PROT SERPL-MCNC: 7.4 G/DL (ref 6.8–8.8)
PROT UR-MCNC: 0.14 G/L
PROT/CREAT 24H UR: 0.09 G/G CR (ref 0–0.2)
RBC # BLD AUTO: 5.32 10E12/L (ref 4.4–5.9)
SODIUM SERPL-SCNC: 140 MMOL/L (ref 133–144)
TACROLIMUS BLD-MCNC: 4.9 UG/L (ref 5–15)
TME LAST DOSE: ABNORMAL H
WBC # BLD AUTO: 5.8 10E9/L (ref 4–11)

## 2020-05-05 PROCEDURE — 85027 COMPLETE CBC AUTOMATED: CPT | Performed by: INTERNAL MEDICINE

## 2020-05-05 PROCEDURE — 86832 HLA CLASS I HIGH DEFIN QUAL: CPT | Performed by: INTERNAL MEDICINE

## 2020-05-05 PROCEDURE — 80076 HEPATIC FUNCTION PANEL: CPT | Performed by: INTERNAL MEDICINE

## 2020-05-05 PROCEDURE — 86833 HLA CLASS II HIGH DEFIN QUAL: CPT | Performed by: INTERNAL MEDICINE

## 2020-05-05 PROCEDURE — 80197 ASSAY OF TACROLIMUS: CPT | Performed by: INTERNAL MEDICINE

## 2020-05-05 PROCEDURE — 83735 ASSAY OF MAGNESIUM: CPT | Performed by: INTERNAL MEDICINE

## 2020-05-05 PROCEDURE — 80048 BASIC METABOLIC PNL TOTAL CA: CPT | Performed by: INTERNAL MEDICINE

## 2020-05-05 PROCEDURE — 84156 ASSAY OF PROTEIN URINE: CPT | Performed by: INTERNAL MEDICINE

## 2020-05-05 PROCEDURE — 36415 COLL VENOUS BLD VENIPUNCTURE: CPT | Performed by: INTERNAL MEDICINE

## 2020-05-07 ENCOUNTER — TELEPHONE (OUTPATIENT)
Dept: PHARMACY | Facility: CLINIC | Age: 40
End: 2020-05-07

## 2020-05-07 NOTE — TELEPHONE ENCOUNTER
Clinical Pharmacy Consult:                                                      Transplant Specific:  48 Month Post Transplant Medication Review  Date of Transplant: 05/11/2016  Type of Transplant: kidney and liver  First Transplant: yes  History of rejection: no    Immunosuppression Regimen   TAC 1.5mg qAM & 1mg qPM and MMF 500mg qAM & 50mg qPM  Immunosuppressant Levels: 4.9 5/5/20  Therapeutic  Patient specific goal: 3-5  Pt adherent to lab draws: yes  Scr:   Lab Results   Component Value Date    CR 1.51 05/05/2020     Side effects: no side effects    Prophylactic Medications  Antibacterial:  Bactrim ss daily  Scheduled Discontinue Date: life long    Antifungal: Not needed thus far    Antiviral: completed    Acid Reducer: not on     Thrombosis Prevention: eliquis  Scheduled Discontinue Date: up to md    Blood Pressure Management  Frequency of home Blood Pressure checks: not taking  Most recent home BP: did not know  Patient Blood pressure goal: <140/90  Patient blood pressure at goal:  Unknown encouraged tto take blood pressure at home  Hospitalizations/ER visits since last assessment: 0      Med rec/DUR performed: yes  Med Rec Discrepancies: no    Isidro reports feeling good.  He is not checking BP at home.  Encouraged him to do so.  He is not using his med card also encouraged him to keep it up to date.  He was taking magnesium with his mycophenolate.  Told him not to.  They just increased his mmf.   Told him to let his coordinator know.      Justin Angeles [facility]     Medication adherence flowsheet 5/7/2020   Patient medication administration: Responsible for own medications   Patient estimated adherence level: %   Pharmacist assessment of adherence: Good   Patient reported doses missed per week: 0-1   Facilitators to medication adherence  Pill box;Schedule/routine      Medication access flowsheet 5/7/2020   Number of pharmacies used: 1   Pharmacy: [facility]   Enrolled in [facility]?  Yes

## 2020-05-07 NOTE — TELEPHONE ENCOUNTER
ELIQUIS 5MG TABS     Last Written Prescription Date:  10/30/2019  Last Fill Quantity: 180,   # refills: 2  Last Office Visit : 3/13/2020  Future Office visit:  None    Routing refill request to provider for review/approval because:  Medication is reported/historical      Netta Watts RN  Central Triage Red Flags/Med Refills

## 2020-05-21 DIAGNOSIS — F41.9 ANXIETY: ICD-10-CM

## 2020-05-21 RX ORDER — BUSPIRONE HYDROCHLORIDE 15 MG/1
TABLET ORAL
Qty: 180 TABLET | Refills: 1 | Status: SHIPPED | OUTPATIENT
Start: 2020-05-21 | End: 2020-10-07

## 2020-06-02 DIAGNOSIS — D47.Z1 POST-TRANSPLANT LYMPHOPROLIFERATIVE DISORDER (H): ICD-10-CM

## 2020-06-02 LAB
ALBUMIN SERPL-MCNC: 4.4 G/DL (ref 3.4–5)
ALP SERPL-CCNC: 64 U/L (ref 40–150)
ALT SERPL W P-5'-P-CCNC: 23 U/L (ref 0–70)
ANION GAP SERPL CALCULATED.3IONS-SCNC: 5 MMOL/L (ref 3–14)
AST SERPL W P-5'-P-CCNC: 18 U/L (ref 0–45)
BASOPHILS # BLD AUTO: 0 10E9/L (ref 0–0.2)
BASOPHILS NFR BLD AUTO: 0.4 %
BILIRUB SERPL-MCNC: 1 MG/DL (ref 0.2–1.3)
BUN SERPL-MCNC: 12 MG/DL (ref 7–30)
CALCIUM SERPL-MCNC: 9.3 MG/DL (ref 8.5–10.1)
CHLORIDE SERPL-SCNC: 104 MMOL/L (ref 94–109)
CO2 SERPL-SCNC: 25 MMOL/L (ref 20–32)
CREAT SERPL-MCNC: 1.51 MG/DL (ref 0.66–1.25)
DIFFERENTIAL METHOD BLD: NORMAL
EOSINOPHIL # BLD AUTO: 0 10E9/L (ref 0–0.7)
EOSINOPHIL NFR BLD AUTO: 0.8 %
ERYTHROCYTE [DISTWIDTH] IN BLOOD BY AUTOMATED COUNT: 12.7 % (ref 10–15)
GFR SERPL CREATININE-BSD FRML MDRD: 57 ML/MIN/{1.73_M2}
GLUCOSE SERPL-MCNC: 88 MG/DL (ref 70–99)
HCT VFR BLD AUTO: 42.6 % (ref 40–53)
HGB BLD-MCNC: 14.8 G/DL (ref 13.3–17.7)
LDH SERPL L TO P-CCNC: 137 U/L (ref 85–227)
LYMPHOCYTES # BLD AUTO: 1.1 10E9/L (ref 0.8–5.3)
LYMPHOCYTES NFR BLD AUTO: 21.7 %
MAGNESIUM SERPL-MCNC: 2.1 MG/DL (ref 1.6–2.3)
MCH RBC QN AUTO: 29.2 PG (ref 26.5–33)
MCHC RBC AUTO-ENTMCNC: 34.7 G/DL (ref 31.5–36.5)
MCV RBC AUTO: 84 FL (ref 78–100)
MONOCYTES # BLD AUTO: 0.5 10E9/L (ref 0–1.3)
MONOCYTES NFR BLD AUTO: 9.9 %
NEUTROPHILS # BLD AUTO: 3.3 10E9/L (ref 1.6–8.3)
NEUTROPHILS NFR BLD AUTO: 67.2 %
PLATELET # BLD AUTO: 151 10E9/L (ref 150–450)
POTASSIUM SERPL-SCNC: 4.1 MMOL/L (ref 3.4–5.3)
PROT SERPL-MCNC: 7.3 G/DL (ref 6.8–8.8)
RBC # BLD AUTO: 5.06 10E12/L (ref 4.4–5.9)
SODIUM SERPL-SCNC: 134 MMOL/L (ref 133–144)
WBC # BLD AUTO: 4.6 10E9/L (ref 4–11)

## 2020-06-02 PROCEDURE — 36415 COLL VENOUS BLD VENIPUNCTURE: CPT | Performed by: INTERNAL MEDICINE

## 2020-06-02 PROCEDURE — 87799 DETECT AGENT NOS DNA QUANT: CPT | Performed by: INTERNAL MEDICINE

## 2020-06-02 PROCEDURE — 83735 ASSAY OF MAGNESIUM: CPT | Performed by: INTERNAL MEDICINE

## 2020-06-02 PROCEDURE — 80053 COMPREHEN METABOLIC PANEL: CPT | Performed by: INTERNAL MEDICINE

## 2020-06-02 PROCEDURE — 85025 COMPLETE CBC W/AUTO DIFF WBC: CPT | Performed by: INTERNAL MEDICINE

## 2020-06-02 PROCEDURE — 83615 LACTATE (LD) (LDH) ENZYME: CPT | Performed by: INTERNAL MEDICINE

## 2020-06-04 LAB
EBV DNA # SPEC NAA+PROBE: NORMAL {COPIES}/ML
EBV DNA SPEC NAA+PROBE-LOG#: NORMAL {LOG_COPIES}/ML

## 2020-06-08 ENCOUNTER — ANCILLARY PROCEDURE (OUTPATIENT)
Dept: ULTRASOUND IMAGING | Facility: CLINIC | Age: 40
End: 2020-06-08
Attending: EMERGENCY MEDICINE
Payer: COMMERCIAL

## 2020-06-08 ENCOUNTER — HOSPITAL ENCOUNTER (EMERGENCY)
Facility: CLINIC | Age: 40
Discharge: HOME | End: 2020-06-09
Attending: EMERGENCY MEDICINE | Admitting: EMERGENCY MEDICINE
Payer: COMMERCIAL

## 2020-06-08 DIAGNOSIS — R00.2 PALPITATIONS: ICD-10-CM

## 2020-06-08 DIAGNOSIS — E87.1 HYPONATREMIA: ICD-10-CM

## 2020-06-08 LAB
ANION GAP SERPL CALCULATED.3IONS-SCNC: 7 MMOL/L (ref 3–14)
BASOPHILS # BLD AUTO: 0 10E9/L (ref 0–0.2)
BASOPHILS NFR BLD AUTO: 0.4 %
BUN SERPL-MCNC: 8 MG/DL (ref 7–30)
CALCIUM SERPL-MCNC: 9.1 MG/DL (ref 8.5–10.1)
CHLORIDE SERPL-SCNC: 94 MMOL/L (ref 94–109)
CO2 SERPL-SCNC: 23 MMOL/L (ref 20–32)
CREAT SERPL-MCNC: 1.28 MG/DL (ref 0.66–1.25)
DIFFERENTIAL METHOD BLD: ABNORMAL
EOSINOPHIL # BLD AUTO: 0 10E9/L (ref 0–0.7)
EOSINOPHIL NFR BLD AUTO: 0.4 %
ERYTHROCYTE [DISTWIDTH] IN BLOOD BY AUTOMATED COUNT: 11.9 % (ref 10–15)
GFR SERPL CREATININE-BSD FRML MDRD: 70 ML/MIN/{1.73_M2}
GLUCOSE SERPL-MCNC: 103 MG/DL (ref 70–99)
HCT VFR BLD AUTO: 37.1 % (ref 40–53)
HGB BLD-MCNC: 13 G/DL (ref 13.3–17.7)
IMM GRANULOCYTES # BLD: 0 10E9/L (ref 0–0.4)
IMM GRANULOCYTES NFR BLD: 0.2 %
INTERPRETATION ECG - MUSE: NORMAL
LYMPHOCYTES # BLD AUTO: 0.8 10E9/L (ref 0.8–5.3)
LYMPHOCYTES NFR BLD AUTO: 15.3 %
MCH RBC QN AUTO: 29.2 PG (ref 26.5–33)
MCHC RBC AUTO-ENTMCNC: 35 G/DL (ref 31.5–36.5)
MCV RBC AUTO: 83 FL (ref 78–100)
MONOCYTES # BLD AUTO: 0.4 10E9/L (ref 0–1.3)
MONOCYTES NFR BLD AUTO: 7.8 %
NEUTROPHILS # BLD AUTO: 3.7 10E9/L (ref 1.6–8.3)
NEUTROPHILS NFR BLD AUTO: 75.9 %
NRBC # BLD AUTO: 0 10*3/UL
NRBC BLD AUTO-RTO: 0 /100
NT-PROBNP SERPL-MCNC: 603 PG/ML (ref 0–450)
OSMOLALITY SERPL: 260 MMOL/KG (ref 275–295)
PLATELET # BLD AUTO: 139 10E9/L (ref 150–450)
POTASSIUM SERPL-SCNC: 3.8 MMOL/L (ref 3.4–5.3)
RBC # BLD AUTO: 4.45 10E12/L (ref 4.4–5.9)
SODIUM SERPL-SCNC: 124 MMOL/L (ref 133–144)
TROPONIN I SERPL-MCNC: <0.015 UG/L (ref 0–0.04)
TSH SERPL DL<=0.005 MIU/L-ACNC: 1.08 MU/L (ref 0.4–4)
WBC # BLD AUTO: 4.9 10E9/L (ref 4–11)

## 2020-06-08 PROCEDURE — 93005 ELECTROCARDIOGRAM TRACING: CPT | Performed by: EMERGENCY MEDICINE

## 2020-06-08 PROCEDURE — 85025 COMPLETE CBC W/AUTO DIFF WBC: CPT | Performed by: EMERGENCY MEDICINE

## 2020-06-08 PROCEDURE — 83930 ASSAY OF BLOOD OSMOLALITY: CPT | Performed by: EMERGENCY MEDICINE

## 2020-06-08 PROCEDURE — 99285 EMERGENCY DEPT VISIT HI MDM: CPT | Mod: 25 | Performed by: EMERGENCY MEDICINE

## 2020-06-08 PROCEDURE — 80048 BASIC METABOLIC PNL TOTAL CA: CPT | Performed by: EMERGENCY MEDICINE

## 2020-06-08 PROCEDURE — 84484 ASSAY OF TROPONIN QUANT: CPT | Performed by: EMERGENCY MEDICINE

## 2020-06-08 PROCEDURE — 93308 TTE F-UP OR LMTD: CPT | Performed by: EMERGENCY MEDICINE

## 2020-06-08 PROCEDURE — 93010 ELECTROCARDIOGRAM REPORT: CPT | Mod: Z6 | Performed by: EMERGENCY MEDICINE

## 2020-06-08 PROCEDURE — 83935 ASSAY OF URINE OSMOLALITY: CPT | Performed by: EMERGENCY MEDICINE

## 2020-06-08 PROCEDURE — 84300 ASSAY OF URINE SODIUM: CPT | Performed by: EMERGENCY MEDICINE

## 2020-06-08 PROCEDURE — 93308 TTE F-UP OR LMTD: CPT | Mod: 26 | Performed by: EMERGENCY MEDICINE

## 2020-06-08 PROCEDURE — 83880 ASSAY OF NATRIURETIC PEPTIDE: CPT | Performed by: EMERGENCY MEDICINE

## 2020-06-08 PROCEDURE — 84443 ASSAY THYROID STIM HORMONE: CPT | Performed by: EMERGENCY MEDICINE

## 2020-06-08 ASSESSMENT — ENCOUNTER SYMPTOMS
CHILLS: 0
FEVER: 0
SHORTNESS OF BREATH: 0
COUGH: 0

## 2020-06-08 ASSESSMENT — MIFFLIN-ST. JEOR: SCORE: 1905.1

## 2020-06-08 NOTE — ED AVS SNAPSHOT
[facility], [place], Emergency Department  [address] [place] [id number]  Phone:  [phone]                                    Cricket Chen   MRN: [id number]    Department:  [facility], Emergency Department   Date of Visit:  6/8/2020           After Visit Summary Signature Page    I have received my discharge instructions, and my questions have been answered. I have discussed any challenges I see with this plan with the nurse or doctor.    ..........................................................................................................................................  Patient/Patient Representative Signature      ..........................................................................................................................................  Patient Representative Print Name and Relationship to Patient    ..................................................               ................................................  Date                                   Time    ..........................................................................................................................................  Reviewed by Signature/Title    ...................................................              ..............................................  Date                                               Time          22EPIC Rev 08/18

## 2020-06-09 VITALS
OXYGEN SATURATION: 95 % | DIASTOLIC BLOOD PRESSURE: 95 MMHG | TEMPERATURE: 98.4 F | RESPIRATION RATE: 16 BRPM | HEIGHT: 70 IN | SYSTOLIC BLOOD PRESSURE: 141 MMHG | BODY MASS INDEX: 31.05 KG/M2 | WEIGHT: 216.9 LBS | HEART RATE: 65 BPM

## 2020-06-09 LAB
ANION GAP SERPL CALCULATED.3IONS-SCNC: 9 MMOL/L (ref 3–14)
BUN SERPL-MCNC: 8 MG/DL (ref 7–30)
CALCIUM SERPL-MCNC: 9 MG/DL (ref 8.5–10.1)
CHLORIDE SERPL-SCNC: 96 MMOL/L (ref 94–109)
CO2 SERPL-SCNC: 21 MMOL/L (ref 20–32)
CREAT SERPL-MCNC: 1.25 MG/DL (ref 0.66–1.25)
GFR SERPL CREATININE-BSD FRML MDRD: 72 ML/MIN/{1.73_M2}
GLUCOSE SERPL-MCNC: 86 MG/DL (ref 70–99)
OSMOLALITY UR: 79 MMOL/KG (ref 100–1200)
POTASSIUM SERPL-SCNC: 3.7 MMOL/L (ref 3.4–5.3)
SODIUM SERPL-SCNC: 126 MMOL/L (ref 133–144)
SODIUM UR-SCNC: 20 MMOL/L

## 2020-06-09 NOTE — ED PROVIDER NOTES
ED Provider Note  [facility]      History     Chief Complaint   Patient presents with     Irregular Heart Beat     Shortness of Breath     HPI  Cricket Chen is a 39 year old male who has a past medical surgical history of kidney liver transplant, transposition of the great vessels, history of thyroid carcinoma, here with intermittent palpitations since yesterday.  Also has history of a flutter.  Takes a Jaun.  Has been cardioverted in the past.  Palpitations last for a couple seconds, take his breath away, feels like his heart has a thud and then bees feels fine.  No cough fever chills difficulty breathing at rest, has mild dyspnea on exertion.    Past Medical History  Past Medical History:   Diagnosis Date     Alcohol abuse     Last drink in Mid-April 2014     Anemia in ESRD (end-stage renal disease) (H)      Anxiety 2008     Atrial flutter (H) 2017     Cirrhosis (H)     S/P liver transplant     Depression      History of blood transfusion      History of transposition of great vessels     atrial switch at age 8 months old     Hypertension      Liver transplant recipient (H)     2016     Papillary thyroid carcinoma (H)      Pneumonia 11-15-14     Renal transplant recipient     2016     Varices, esophageal (H)      Past Surgical History:   Procedure Laterality Date     ANESTHESIA CARDIOVERSION N/A 3/7/2018    Procedure: ANESTHESIA CARDIOVERSION;  Cardioversion;  Surgeon: GENERIC ANESTHESIA PROVIDER;  Location:  OR     BENCH LIVER N/A 5/10/2016    Procedure: BENCH LIVER;  Surgeon: Ricky Deshpande MD;  Location: [facility] OR     BIOPSY LYMPH NODE CERVICAL Right 1/26/2017    Procedure: BIOPSY LYMPH NODE CERVICAL;  Surgeon: Beka Soto MD;  Location:  OR     CARDIAC SURGERY  9-10-80     CREATE FISTULA ARTERIOVENOUS UPPER EXTREMITY Right 9/16/2014    Procedure: CREATE FISTULA ARTERIOVENOUS UPPER EXTREMITY;  Surgeon: Padmaja Eaton MD;  [facility]     CV RIGHT HEART CATH N/A  4/19/2019    Procedure: CV RIGHT HEART CATH;  Surgeon: Sabi Wiggins MD;  Location:  HEART CARDIAC CATH LAB     CYSTOSCOPY, REMOVE STENT(S), COMBINED Right 6/22/2016    Procedure: COMBINED CYSTOSCOPY, REMOVE STENT(S);  Surgeon: Ricky Deshpande MD;  [facility]     EMBOLECTOMY UPPER EXTREMITY Right 8/17/2018    Procedure: EMBOLECTOMY UPPER EXTREMITY;  Repair Right Upper Arm Pseudo Aneursym ;  Surgeon: John Payton MD;  Location: [facility] OR     ENT SURGERY       ESOPHAGOSCOPY, GASTROSCOPY, DUODENOSCOPY (EGD), COMBINED  5/30/2014    Procedure: COMBINED ESOPHAGOSCOPY, GASTROSCOPY, DUODENOSCOPY (EGD);  Surgeon: Guillaume Bautista MD;  Location:  GI     ESOPHAGOSCOPY, GASTROSCOPY, DUODENOSCOPY (EGD), COMBINED  9/30/14     ESOPHAGOSCOPY, GASTROSCOPY, DUODENOSCOPY (EGD), COMBINED Left 3/12/2015    Procedure: COMBINED ESOPHAGOSCOPY, GASTROSCOPY, DUODENOSCOPY (EGD);  Surgeon: Laureen Galvan MD;  Location:  GI     ESOPHAGOSCOPY, GASTROSCOPY, DUODENOSCOPY (EGD), COMBINED N/A 4/21/2016    Procedure: COMBINED ESOPHAGOSCOPY, GASTROSCOPY, DUODENOSCOPY (EGD);  Surgeon: Laureen Galvan MD;  Location:  GI     ESOPHAGOSCOPY, GASTROSCOPY, DUODENOSCOPY (EGD), COMBINED N/A 7/21/2016    Procedure: COMBINED ESOPHAGOSCOPY, GASTROSCOPY, DUODENOSCOPY (EGD);  Surgeon: Laureen Galvan MD;  Location:  GI     HC OR CATH ABLATION NON-CARDIAC ENDOVASCULAR  1990,2002    SVT     INSERT PORT VASCULAR ACCESS N/A 4/3/2017    Procedure: INSERT PORT VASCULAR ACCESS;  Surgeon: Rajendra Jacques PA-C;  [facility] OR     IR PORT REMOVAL LEFT  1/22/2019     LIGATE FISTULA ARTERIOVENOUS UPPER EXTREMITY Right 11/7/2017    Procedure: LIGATE FISTULA ARTERIOVENOUS UPPER EXTREMITY;  Revision of Right Arm Arteriovenous Fistula ;  Surgeon: Padmaja Eaton MD;  [facility]     PERCUTANEOUS BIOPSY KIDNEY Right 9/26/2018    Procedure: PERCUTANEOUS BIOPSY KIDNEY;  Right Kidney Biopsy;   Surgeon: Yuval Khan MD;  [facility]     PERCUTANEOUS BIOPSY KIDNEY Right 10/30/2019    Procedure: Right Side Kidney Biopsy;  Surgeon: Jake Sidhu MD;  [facility]     PICC INSERTION  14    PICC line placement 14; Removal 2014     REMOVE PORT VASCULAR ACCESS Right 2019    Procedure: Right chest Port Removal;  Surgeon: Erasmo Ziegler PA-C;  [facility]: [facility] OR     THORACIC SURGERY  1980    Transposition great arteries, repaired at 8 months     THYROIDECTOMY Right 2017    Procedure: THYROIDECTOMY;  Bilateral Total Thyroidectomy ;  Surgeon: Beka Soto MD;  [facility]     TRANSPLANT KIDNEY RECIPIENT  DONOR  5/10/2016    Procedure: TRANSPLANT KIDNEY RECIPIENT  DONOR;  Surgeon: Ricky Deshpande MD;  [facility]     TRANSPLANT LIVER RECIPIENT  DONOR N/A 5/10/2016    Procedure: TRANSPLANT LIVER RECIPIENT  DONOR;  Surgeon: Ricky Deshpande MD;  [facility]     apixaban ANTICOAGULANT (ELIQUIS ANTICOAGULANT) 5 MG tablet  busPIRone (BUSPAR) 15 MG tablet  desonide (DESOWEN) 0.05 % ointment  hydrOXYzine (ATARAX) 50 MG tablet  ketoconazole (NIZORAL) 2 % cream  ketoconazole (NIZORAL) 2 % shampoo  levothyroxine (SYNTHROID/LEVOTHROID) 137 MCG tablet  lisinopril (PRINIVIL/ZESTRIL) 10 MG tablet  magnesium oxide (MAG-OX) 400 MG tablet  metoprolol succinate ER (TOPROL-XL) 25 MG 24 hr tablet  mycophenolate (GENERIC EQUIVALENT) 250 MG capsule  sulfamethoxazole-trimethoprim (BACTRIM/SEPTRA) 400-80 MG tablet  tacrolimus (GENERIC EQUIVALENT) 0.5 MG capsule  tacrolimus (GENERIC EQUIVALENT) 1 MG capsule      Allergies   Allergen Reactions     Hydromorphone Itching     Past medical history, past surgical history, medications, and allergies were reviewed with the patient. Additional pertinent items: None    Family History  Family History   Problem Relation Age of Onset     No Known Problems Brother      Arthritis Father       "Hypertension Father      Hypertension Mother      Anxiety Disorder Mother      Hypertension Sister      No Known Problems Maternal Grandmother      No Known Problems Maternal Grandfather      No Known Problems Paternal Grandmother      No Known Problems Sister      No Known Problems Paternal Grandfather      Alcohol/Drug No family hx of      Gastrointestinal Disease No family hx of         no fam hx of liver disease or liver cancer     Family history was reviewed with the patient. Additional pertinent items: None    Social History  Social History     Tobacco Use     Smoking status: Former Smoker     Packs/day: 0.33     Years: 3.00     Pack years: 0.99     Types: Cigarettes     Start date: 1997     Last attempt to quit: 2000     Years since quittin.7     Smokeless tobacco: Former User     Tobacco comment: Quit chewing in early    Substance Use Topics     Alcohol use: No     Alcohol/week: 0.0 standard drinks     Comment: ~10 drinks per day for ten years, quit in 2014     Drug use: No      Social history was reviewed with the patient. Additional pertinent items: None    Review of Systems   Constitutional: Negative for chills and fever.   Respiratory: Negative for cough and shortness of breath.    All other systems reviewed and are negative.    A complete review of systems was performed with pertinent positives and negatives noted in the HPI, and all other systems negative.    Physical Exam   Heart Rate: 68  Temp: 98.4  F (36.9  C)  Resp: 16  Height: 177.8 cm (5' 10\")  Weight: 98.4 kg (216 lb 14.4 oz)  SpO2: 97 %  Physical Exam  Vitals signs and nursing note reviewed.   Constitutional:       General: He is not in acute distress.     Appearance: Normal appearance.   HENT:      Head: Normocephalic and atraumatic.      Right Ear: External ear normal.      Left Ear: External ear normal.      Nose: Nose normal. No rhinorrhea.   Eyes:      Conjunctiva/sclera: Conjunctivae normal.   Cardiovascular: "      Rate and Rhythm: Normal rate and regular rhythm.      Pulses: Normal pulses.   Pulmonary:      Effort: Pulmonary effort is normal. No respiratory distress.      Breath sounds: Normal breath sounds.   Abdominal:      General: There is no distension.      Palpations: Abdomen is soft.      Tenderness: There is no abdominal tenderness.   Musculoskeletal:         General: No deformity or signs of injury.   Skin:     General: Skin is warm and dry.      Capillary Refill: Capillary refill takes less than 2 seconds.   Neurological:      General: No focal deficit present.      Mental Status: He is alert. Mental status is at baseline.   Psychiatric:         Mood and Affect: Mood normal.         Behavior: Behavior normal.         ED Course      Procedures  Results for orders placed during the hospital encounter of 06/08/20   POC US ECHO LIMITED    [facility] Procedure Note      Limited Bedside ED Cardiac Ultrasound:    PROCEDURE: PERFORMED BY: Dr. Thuan Farley MD  INDICATIONS/SYMPTOM:  Palpitations, shortness of breath  PROBE: Cardiac phased array probe  BODY LOCATION: Chest  FINDINGS:   The ultrasound was performed utilizing the parasternal long axis, parasternal short axis and apical 4 chamber views.  Cardiac contractility:  Present  Gross estimation of cardiac kinesis: normal  Pericardial Effusion:  None  RV:LV ratio: Equal  INTERPRETATION:    Cardiac motion appears well preserved No pericardial effusion was found.  IVC visualized and findings indicate normovolemia.  IMAGE DOCUMENTATION: Images were archived to PACs system.                   EKG Interpretation:      Interpreted by Thuan Farley MD  Time reviewed: 2130  Symptoms at time of EKG: None   Rhythm: normal sinus   Rate: Normal  Axis: Right Axis Deviation  Ectopy: none  Conduction: right bundle branch block (complete)  ST Segments/ T Waves: ST Segment Depression V2, V3 and V4  Q Waves: III  Comparison to prior: Unchanged    Clinical  Impression: no acute changes                            Results for orders placed or performed during the hospital encounter of 06/08/20   POC US ECHO LIMITED     Status: None    Impression    [facility] Procedure Note      Limited Bedside ED Cardiac Ultrasound:    PROCEDURE: PERFORMED BY: Dr. Thuan Farley MD  INDICATIONS/SYMPTOM:  Palpitations, shortness of breath  PROBE: Cardiac phased array probe  BODY LOCATION: Chest  FINDINGS:   The ultrasound was performed utilizing the parasternal long axis, parasternal short axis and apical 4 chamber views.  Cardiac contractility:  Present  Gross estimation of cardiac kinesis: normal  Pericardial Effusion:  None  RV:LV ratio: Equal  INTERPRETATION:    Cardiac motion appears well preserved No pericardial effusion was found.  IVC visualized and findings indicate normovolemia.  IMAGE DOCUMENTATION: Images were archived to PACs system.         EKG 12 lead     Status: None   Result Value Ref Range    Interpretation ECG Click View Image link to view waveform and result      Medications - No data to display     Assessments & Plan (with Medical Decision Making)     39-year-old male past medical surgical history as above here with intermittent palpitations, I observed a few likely PVCs while performing his cardiac echo, I suspect this may be the cause of his palpitations, however could be a flutter.  Given lack of critical symptoms while since seeing these palpitations, such as no presyncope or diaphoresis, suspect patient can be discharged if work-up unremarkable.  However, will discharge with recommendation that patient get a Biopatch by his cardiologist.    I have reviewed the nursing notes. I have reviewed the findings, diagnosis, plan and need for follow up with the patient.    New Prescriptions    No medications on file       Final diagnoses:   Palpitations       --  Thuan Farley MD   Emergency Medicine   [facility], [place], EMERGENCY DEPARTMENT  6/8/2020     Martine  MD Thuan  06/08/20 [id number]

## 2020-06-09 NOTE — ED TRIAGE NOTES
Pt having heart palpitations/skipped beats on and off since yesterday, mild shortness of breath. Hx liver/kidney transplant, atrial flutter, transposition of the heart vessels.

## 2020-06-12 ENCOUNTER — TELEPHONE (OUTPATIENT)
Dept: TRANSPLANT | Facility: CLINIC | Age: 40
End: 2020-06-12

## 2020-06-12 DIAGNOSIS — Z94.4 LIVER REPLACED BY TRANSPLANT (H): ICD-10-CM

## 2020-06-12 DIAGNOSIS — E87.1 HYPONATREMIA: ICD-10-CM

## 2020-06-12 DIAGNOSIS — Z13.220 LIPID SCREENING: ICD-10-CM

## 2020-06-12 DIAGNOSIS — Z94.0 S/P KIDNEY TRANSPLANT: ICD-10-CM

## 2020-06-12 DIAGNOSIS — Z94.4 LIVER REPLACED BY TRANSPLANT (H): Primary | ICD-10-CM

## 2020-06-12 NOTE — TELEPHONE ENCOUNTER
Dr. Sidhu aware of recent ER visit and low sodium level, his response below,    Any new medications?  How much water is he drinking in a day?  Is he eating?  Does he have any leg swelling (nothing was documented one way or the other in the ER visit)?     With his low serum sodium and low urine osmolality, and probable euvolemia, it seems to fit best with drinking too much water and/or not eating much.     Let's recheck labs and add an am cortisol level.  Will go from there.     Attempted to reach Cricket to assess his PO intake and ask that he get labs repeated next week including an AM cortisol level.     No answer, left a message for him to call me and main office number provided.       *UPDATE*  Cricket returned my call. He has not started any new medications. He drinks between 3-4 liters of water per day and was instructed to decrease his intake of free water. He said his appetite has decreased in the past 2 weeks and he isn't sure why but he eats 1-2 times per day. We discussed that both his increased water intake and decreased food intake could be contributing to his low sodium level. He currently does not note any leg swelling.     Cricket will get labs drawn next week including a cortisol level. Orders placed.

## 2020-06-12 NOTE — TELEPHONE ENCOUNTER
Patient Call: Lidia    Reason for call: patient returning call    Call back needed? Yes    Return Call Needed  Same as documented in contacts section  When to return call?: Same day: [facility] [employer]

## 2020-06-16 DIAGNOSIS — E87.1 HYPONATREMIA: ICD-10-CM

## 2020-06-16 DIAGNOSIS — Z94.0 S/P KIDNEY TRANSPLANT: ICD-10-CM

## 2020-06-16 DIAGNOSIS — Z13.220 LIPID SCREENING: ICD-10-CM

## 2020-06-16 DIAGNOSIS — Z94.4 LIVER REPLACED BY TRANSPLANT (H): ICD-10-CM

## 2020-06-16 LAB
ALBUMIN SERPL-MCNC: 4.5 G/DL (ref 3.4–5)
ALBUMIN UR-MCNC: NEGATIVE MG/DL
ALP SERPL-CCNC: 55 U/L (ref 40–150)
ALT SERPL W P-5'-P-CCNC: 24 U/L (ref 0–70)
ANION GAP SERPL CALCULATED.3IONS-SCNC: 4 MMOL/L (ref 3–14)
APPEARANCE UR: CLEAR
AST SERPL W P-5'-P-CCNC: 15 U/L (ref 0–45)
BILIRUB DIRECT SERPL-MCNC: 0.2 MG/DL (ref 0–0.2)
BILIRUB SERPL-MCNC: 0.8 MG/DL (ref 0.2–1.3)
BILIRUB UR QL STRIP: NEGATIVE
BUN SERPL-MCNC: 15 MG/DL (ref 7–30)
CALCIUM SERPL-MCNC: 9.4 MG/DL (ref 8.5–10.1)
CHLORIDE SERPL-SCNC: 107 MMOL/L (ref 94–109)
CHOLEST SERPL-MCNC: 158 MG/DL
CO2 SERPL-SCNC: 25 MMOL/L (ref 20–32)
COLOR UR AUTO: YELLOW
CORTIS SERPL-MCNC: 20.8 UG/DL (ref 4–22)
CREAT SERPL-MCNC: 1.61 MG/DL (ref 0.66–1.25)
CREAT UR-MCNC: 167 MG/DL
ERYTHROCYTE [DISTWIDTH] IN BLOOD BY AUTOMATED COUNT: 12.8 % (ref 10–15)
GFR SERPL CREATININE-BSD FRML MDRD: 53 ML/MIN/{1.73_M2}
GLUCOSE SERPL-MCNC: 96 MG/DL (ref 70–99)
GLUCOSE UR STRIP-MCNC: NEGATIVE MG/DL
HCT VFR BLD AUTO: 42.2 % (ref 40–53)
HDLC SERPL-MCNC: 38 MG/DL
HGB BLD-MCNC: 14.5 G/DL (ref 13.3–17.7)
HGB UR QL STRIP: NEGATIVE
KETONES UR STRIP-MCNC: NEGATIVE MG/DL
LDLC SERPL CALC-MCNC: 100 MG/DL
LEUKOCYTE ESTERASE UR QL STRIP: NEGATIVE
MAGNESIUM SERPL-MCNC: 2.1 MG/DL (ref 1.6–2.3)
MCH RBC QN AUTO: 29.5 PG (ref 26.5–33)
MCHC RBC AUTO-ENTMCNC: 34.4 G/DL (ref 31.5–36.5)
MCV RBC AUTO: 86 FL (ref 78–100)
NITRATE UR QL: NEGATIVE
NONHDLC SERPL-MCNC: 120 MG/DL
PH UR STRIP: 7 PH (ref 5–7)
PLATELET # BLD AUTO: 166 10E9/L (ref 150–450)
POTASSIUM SERPL-SCNC: 4.2 MMOL/L (ref 3.4–5.3)
PROT SERPL-MCNC: 7.4 G/DL (ref 6.8–8.8)
PROT UR-MCNC: 0.14 G/L
PROT/CREAT 24H UR: 0.08 G/G CR (ref 0–0.2)
RBC # BLD AUTO: 4.92 10E12/L (ref 4.4–5.9)
SODIUM SERPL-SCNC: 136 MMOL/L (ref 133–144)
SOURCE: NORMAL
SP GR UR STRIP: 1.01 (ref 1–1.03)
TRIGL SERPL-MCNC: 101 MG/DL
UROBILINOGEN UR STRIP-ACNC: 0.2 EU/DL (ref 0.2–1)
WBC # BLD AUTO: 4.5 10E9/L (ref 4–11)

## 2020-06-16 PROCEDURE — 80076 HEPATIC FUNCTION PANEL: CPT | Performed by: INTERNAL MEDICINE

## 2020-06-16 PROCEDURE — 83735 ASSAY OF MAGNESIUM: CPT | Performed by: INTERNAL MEDICINE

## 2020-06-16 PROCEDURE — 80061 LIPID PANEL: CPT | Performed by: INTERNAL MEDICINE

## 2020-06-16 PROCEDURE — 85027 COMPLETE CBC AUTOMATED: CPT | Performed by: INTERNAL MEDICINE

## 2020-06-16 PROCEDURE — 81003 URINALYSIS AUTO W/O SCOPE: CPT | Performed by: INTERNAL MEDICINE

## 2020-06-16 PROCEDURE — 80048 BASIC METABOLIC PNL TOTAL CA: CPT | Performed by: INTERNAL MEDICINE

## 2020-06-16 PROCEDURE — 36415 COLL VENOUS BLD VENIPUNCTURE: CPT | Performed by: INTERNAL MEDICINE

## 2020-06-16 PROCEDURE — 84156 ASSAY OF PROTEIN URINE: CPT | Performed by: INTERNAL MEDICINE

## 2020-06-16 PROCEDURE — 82533 TOTAL CORTISOL: CPT | Performed by: INTERNAL MEDICINE

## 2020-07-07 DIAGNOSIS — Z94.4 LIVER REPLACED BY TRANSPLANT (H): ICD-10-CM

## 2020-07-07 DIAGNOSIS — Z13.220 LIPID SCREENING: ICD-10-CM

## 2020-07-07 LAB
ALBUMIN SERPL-MCNC: 4.1 G/DL (ref 3.4–5)
ALP SERPL-CCNC: 58 U/L (ref 40–150)
ALT SERPL W P-5'-P-CCNC: 45 U/L (ref 0–70)
ANION GAP SERPL CALCULATED.3IONS-SCNC: 7 MMOL/L (ref 3–14)
AST SERPL W P-5'-P-CCNC: 25 U/L (ref 0–45)
BILIRUB DIRECT SERPL-MCNC: <0.1 MG/DL (ref 0–0.2)
BILIRUB SERPL-MCNC: 0.5 MG/DL (ref 0.2–1.3)
BUN SERPL-MCNC: 11 MG/DL (ref 7–30)
CALCIUM SERPL-MCNC: 9.2 MG/DL (ref 8.5–10.1)
CHLORIDE SERPL-SCNC: 106 MMOL/L (ref 94–109)
CO2 SERPL-SCNC: 25 MMOL/L (ref 20–32)
CREAT SERPL-MCNC: 1.5 MG/DL (ref 0.66–1.25)
ERYTHROCYTE [DISTWIDTH] IN BLOOD BY AUTOMATED COUNT: 14 % (ref 10–15)
GFR SERPL CREATININE-BSD FRML MDRD: 57 ML/MIN/{1.73_M2}
GLUCOSE SERPL-MCNC: 91 MG/DL (ref 70–99)
HCT VFR BLD AUTO: 44.5 % (ref 40–53)
HGB BLD-MCNC: 14.6 G/DL (ref 13.3–17.7)
MAGNESIUM SERPL-MCNC: 1.8 MG/DL (ref 1.6–2.3)
MCH RBC QN AUTO: 29.1 PG (ref 26.5–33)
MCHC RBC AUTO-ENTMCNC: 32.8 G/DL (ref 31.5–36.5)
MCV RBC AUTO: 89 FL (ref 78–100)
PLATELET # BLD AUTO: 148 10E9/L (ref 150–450)
POTASSIUM SERPL-SCNC: 4.2 MMOL/L (ref 3.4–5.3)
PROT SERPL-MCNC: 7.2 G/DL (ref 6.8–8.8)
RBC # BLD AUTO: 5.01 10E12/L (ref 4.4–5.9)
SODIUM SERPL-SCNC: 138 MMOL/L (ref 133–144)
WBC # BLD AUTO: 4.5 10E9/L (ref 4–11)

## 2020-07-07 PROCEDURE — 80076 HEPATIC FUNCTION PANEL: CPT | Performed by: INTERNAL MEDICINE

## 2020-07-07 PROCEDURE — 36415 COLL VENOUS BLD VENIPUNCTURE: CPT | Performed by: INTERNAL MEDICINE

## 2020-07-07 PROCEDURE — 80048 BASIC METABOLIC PNL TOTAL CA: CPT | Performed by: INTERNAL MEDICINE

## 2020-07-07 PROCEDURE — 80197 ASSAY OF TACROLIMUS: CPT | Performed by: INTERNAL MEDICINE

## 2020-07-07 PROCEDURE — 85027 COMPLETE CBC AUTOMATED: CPT | Performed by: INTERNAL MEDICINE

## 2020-07-07 PROCEDURE — 83735 ASSAY OF MAGNESIUM: CPT | Performed by: INTERNAL MEDICINE

## 2020-07-08 LAB
TACROLIMUS BLD-MCNC: 4.4 UG/L (ref 5–15)
TME LAST DOSE: ABNORMAL H

## 2020-08-03 DIAGNOSIS — Z94.0 KIDNEY TRANSPLANTED: ICD-10-CM

## 2020-08-03 RX ORDER — LISINOPRIL 10 MG/1
TABLET ORAL
Qty: 90 TABLET | Refills: 1 | Status: SHIPPED | OUTPATIENT
Start: 2020-08-03 | End: 2021-06-22

## 2020-08-04 ENCOUNTER — DOCUMENTATION ONLY (OUTPATIENT)
Dept: LAB | Facility: CLINIC | Age: 40
End: 2020-08-04

## 2020-08-04 DIAGNOSIS — Z94.4 LIVER REPLACED BY TRANSPLANT (H): ICD-10-CM

## 2020-08-04 DIAGNOSIS — Z13.220 LIPID SCREENING: ICD-10-CM

## 2020-08-04 LAB
ERYTHROCYTE [DISTWIDTH] IN BLOOD BY AUTOMATED COUNT: 13.4 % (ref 10–15)
HCT VFR BLD AUTO: 45.3 % (ref 40–53)
HGB BLD-MCNC: 15.2 G/DL (ref 13.3–17.7)
MCH RBC QN AUTO: 29.6 PG (ref 26.5–33)
MCHC RBC AUTO-ENTMCNC: 33.6 G/DL (ref 31.5–36.5)
MCV RBC AUTO: 88 FL (ref 78–100)
PLATELET # BLD AUTO: 142 10E9/L (ref 150–450)
RBC # BLD AUTO: 5.14 10E12/L (ref 4.4–5.9)
WBC # BLD AUTO: 5.3 10E9/L (ref 4–11)

## 2020-08-04 PROCEDURE — 80048 BASIC METABOLIC PNL TOTAL CA: CPT | Performed by: INTERNAL MEDICINE

## 2020-08-04 PROCEDURE — 83735 ASSAY OF MAGNESIUM: CPT | Performed by: INTERNAL MEDICINE

## 2020-08-04 PROCEDURE — 36415 COLL VENOUS BLD VENIPUNCTURE: CPT | Performed by: INTERNAL MEDICINE

## 2020-08-04 PROCEDURE — 85027 COMPLETE CBC AUTOMATED: CPT | Performed by: INTERNAL MEDICINE

## 2020-08-04 PROCEDURE — 80197 ASSAY OF TACROLIMUS: CPT | Performed by: INTERNAL MEDICINE

## 2020-08-04 PROCEDURE — 80076 HEPATIC FUNCTION PANEL: CPT | Performed by: INTERNAL MEDICINE

## 2020-08-05 LAB
ALBUMIN SERPL-MCNC: 4 G/DL (ref 3.4–5)
ALP SERPL-CCNC: 70 U/L (ref 40–150)
ALT SERPL W P-5'-P-CCNC: 46 U/L (ref 0–70)
ANION GAP SERPL CALCULATED.3IONS-SCNC: 6 MMOL/L (ref 3–14)
AST SERPL W P-5'-P-CCNC: 22 U/L (ref 0–45)
BILIRUB DIRECT SERPL-MCNC: 0.1 MG/DL (ref 0–0.2)
BILIRUB SERPL-MCNC: 0.5 MG/DL (ref 0.2–1.3)
BUN SERPL-MCNC: 15 MG/DL (ref 7–30)
CALCIUM SERPL-MCNC: 9 MG/DL (ref 8.5–10.1)
CHLORIDE SERPL-SCNC: 108 MMOL/L (ref 94–109)
CO2 SERPL-SCNC: 25 MMOL/L (ref 20–32)
CREAT SERPL-MCNC: 1.53 MG/DL (ref 0.66–1.25)
GFR SERPL CREATININE-BSD FRML MDRD: 56 ML/MIN/{1.73_M2}
GLUCOSE SERPL-MCNC: 92 MG/DL (ref 70–99)
MAGNESIUM SERPL-MCNC: 1.8 MG/DL (ref 1.6–2.3)
POTASSIUM SERPL-SCNC: 4 MMOL/L (ref 3.4–5.3)
PROT SERPL-MCNC: 6.7 G/DL (ref 6.8–8.8)
SODIUM SERPL-SCNC: 139 MMOL/L (ref 133–144)
TACROLIMUS BLD-MCNC: 3.7 UG/L (ref 5–15)
TME LAST DOSE: ABNORMAL H

## 2020-08-06 ENCOUNTER — VIRTUAL VISIT (OUTPATIENT)
Dept: TRANSPLANT | Facility: CLINIC | Age: 40
End: 2020-08-06
Attending: INTERNAL MEDICINE
Payer: COMMERCIAL

## 2020-08-06 DIAGNOSIS — D47.Z1 POST-TRANSPLANT LYMPHOPROLIFERATIVE DISORDER (H): Primary | ICD-10-CM

## 2020-08-06 PROCEDURE — 40001009 ZZH VIDEO/TELEPHONE VISIT; NO CHARGE

## 2020-08-06 NOTE — PROGRESS NOTES
"Cricket Chen is a 39 year old male who is being evaluated via a billable video visit.      The patient has been notified of following:     \"This video visit will be conducted via a call between you and your physician/provider. We have found that certain health care needs can be provided without the need for an in-person physical exam.  This service lets us provide the care you need with a video conversation.  If a prescription is necessary we can send it directly to your pharmacy.  If lab work is needed we can place an order for that and you can then stop by our lab to have the test done at a later time.    Video visits are billed at different rates depending on your insurance coverage.  Please reach out to your insurance provider with any questions.    If during the course of the call the physician/provider feels a video visit is not appropriate, you will not be charged for this service.\"    Patient has given verbal consent for Video visit? Yes  How would you like to obtain your AVS? MyChart  If you are dropped from the video visit, the video invite should be resent to: Send to e-mail at: [email or web address]  Will anyone else be joining your video visit? No        Video-Visit Details    Type of service:  Video Visit    Video Start Time: 2:22  Video End Time: 2:29  Care Coordination: 2 minutes  Total Time: 9 minutes    Originating Location (pt. Location): Home    Distant Location (provider location):  [facility]OD [facility]ARR[facility]PL[facility]     Platform used for Video Visit: [employer]        Pt has no new needs   Elizabeth Quigley CMA on 8/6/2020 at 1:54 PM    [facility] Clinic Visit  Feb 6, 2020        Disease and Transplant History:  1. 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   -- CellCept was reduced from 1500 b.i.d. to 250 twice a day, and  tacrolimus 3 mg twice a day was " decreased to 2 mg twice a day.   2. He tolerated the RSST well and completed 4 weekly infusions of rituximab followed by R-COEP x4 (last cycle completed on 6/7/2017).   -- Post Treatment PET/CT scan and he had achieved CR1. Notably, his diagnostic PET/CT scan identified a thyroid nodule, which was consistent with papillary thyroid carcinoma based on the ultrasound-guided fine needle aspiration and he underwent thyroidectomy.    -- Repeat CT chest, abdomen and pelvis on 7/8/2019 did not show any evidence of active PTLD.  3. Observation        HPI: Isidro presents for follow-up today for his history of PTLD. Was seen in ER on 6/8/20 for palpitations that happened in the setting of a low sodium level and when he backed off the hydration all improved. No recent fevers or chills. No chest pain or SOB, no nausea or vomiting, no diarrhea, no bleeding or bruising, no GI issues. No weight loss. No night sweats. No new lumps or bumps. No concerns       10 point ROS otherwise negative     Physical Exam:  Well appearing  No visible rash on exposed skin        Labs:    Results for IJEOMA DOBSON (MRN [id number]) as of 8/6/2020 14:18   Ref. Range 8/4/2020 11:27   Sodium Latest Ref Range: 133 - 144 mmol/L 139   Potassium Latest Ref Range: 3.4 - 5.3 mmol/L 4.0   Chloride Latest Ref Range: 94 - 109 mmol/L 108   Carbon Dioxide Latest Ref Range: 20 - 32 mmol/L 25   Urea Nitrogen Latest Ref Range: 7 - 30 mg/dL 15   Creatinine Latest Ref Range: 0.66 - 1.25 mg/dL 1.53 (H)   GFR Estimate Latest Ref Range: >60 mL/min/1.73_m2 56 (L)   GFR Estimate If Black Latest Ref Range: >60 mL/min/1.73_m2 65   Calcium Latest Ref Range: 8.5 - 10.1 mg/dL 9.0   Anion Gap Latest Ref Range: 3 - 14 mmol/L 6   Magnesium Latest Ref Range: 1.6 - 2.3 mg/dL 1.8   Albumin Latest Ref Range: 3.4 - 5.0 g/dL 4.0   Protein Total Latest Ref Range: 6.8 - 8.8 g/dL 6.7 (L)   Bilirubin Total Latest Ref Range: 0.2 - 1.3 mg/dL 0.5   Alkaline Phosphatase Latest Ref Range: 40 -  150 U/L 70   ALT Latest Ref Range: 0 - 70 U/L 46   AST Latest Ref Range: 0 - 45 U/L 22   Bilirubin Direct Latest Ref Range: 0.0 - 0.2 mg/dL 0.1   Glucose Latest Ref Range: 70 - 99 mg/dL 92   WBC Latest Ref Range: 4.0 - 11.0 10e9/L 5.3   Hemoglobin Latest Ref Range: 13.3 - 17.7 g/dL 15.2   Hematocrit Latest Ref Range: 40.0 - 53.0 % 45.3   Platelet Count Latest Ref Range: 150 - 450 10e9/L 142 (L)   RBC Count Latest Ref Range: 4.4 - 5.9 10e12/L 5.14   MCV Latest Ref Range: 78 - 100 fl 88   MCH Latest Ref Range: 26.5 - 33.0 pg 29.6   MCHC Latest Ref Range: 31.5 - 36.5 g/dL 33.6   RDW Latest Ref Range: 10.0 - 15.0 % 13.4   Tacrolimus Last Dose Unknown 8/03/2020 2315   Tacrolimus Level Latest Ref Range: 5.0 - 15.0 ug/L 3.7 (L)           A/P: 38 yo man with history of PTLD dx in 2017 treated with Rituxan and then R-COEP X 4 with CR.     1. History of PTLD in ongoing remission 3.5 years out from chemo completion. Discussed that going forward we do clinical exams only and scan based on symptoms. No sign of recurrence on symptoms and patient report of exam. Visit and clinical exam in 6 months     2. Renal: creatinine stable in the mid 1.5 range  - stable immunosuppression with good Tac level     3.ID: no current infectious issues     4. Papillary Thyroid Cancer Status post resection: on synthroid. Follows with endocrine     Final Plan:  - [facility] with labs in 6 months    Olena Crenshaw MD

## 2020-08-06 NOTE — LETTER
8/6/2020         RE: Cricket Chen  [address]  [place] [address]        Dear Colleague,    Thank you for referring your patient, Cricket Chen, to the [facility] BLOOD AND MARROW TRANSPL[facility]. Please see a copy of my visit note below.    Cricket Chen is a 39 year old male who is being evaluated via a billable video visit.        Video-Visit Details    Type of service:  Video Visit    Video Start Time: 2:22  Video End Time: 2:29  Care Coordination: 2 minutes  Total Time: 9 minutes    Originating Location (pt. Location): Home    Distant Location (provider location):  [facility] BLOOD AND MARROW TRANSPLANT     Platform used for Video Visit: Kd        Pt has no new needs   Elizabeth Quigley CMA on 8/6/2020 at 1:54 PM    [facility] Clinic Visit  Feb 6, 2020        Disease and Transplant History:  1. 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   -- CellCept was reduced from 1500 b.i.d. to 250 twice a day, and  tacrolimus 3 mg twice a day was decreased to 2 mg twice a day.   2. He tolerated the RSST well and completed 4 weekly infusions of rituximab followed by R-COEP x4 (last cycle completed on 6/7/2017).   -- Post Treatment PET/CT scan and he had achieved CR1. Notably, his diagnostic PET/CT scan identified a thyroid nodule, which was consistent with papillary thyroid carcinoma based on the ultrasound-guided fine needle aspiration and he underwent thyroidectomy.    -- Repeat CT chest, abdomen and pelvis on 7/8/2019 did not show any evidence of active PTLD.  3. Observation        HPI: Isidro presents for follow-up today for his history of PTLD. Was seen in ER on 6/8/20 for palpitations that happened in the setting of a low sodium level and when he backed off the hydration all improved. No recent fevers or chills. No chest pain or SOB, no nausea or vomiting, no  diarrhea, no bleeding or bruising, no GI issues. No weight loss. No night sweats. No new lumps or bumps. No concerns       10 point ROS otherwise negative     Physical Exam:  Well appearing  No visible rash on exposed skin        Labs:    Results for IJEOMA DOBSON (MRN [id number]) as of 8/6/2020 14:18   Ref. Range 8/4/2020 11:27   Sodium Latest Ref Range: 133 - 144 mmol/L 139   Potassium Latest Ref Range: 3.4 - 5.3 mmol/L 4.0   Chloride Latest Ref Range: 94 - 109 mmol/L 108   Carbon Dioxide Latest Ref Range: 20 - 32 mmol/L 25   Urea Nitrogen Latest Ref Range: 7 - 30 mg/dL 15   Creatinine Latest Ref Range: 0.66 - 1.25 mg/dL 1.53 (H)   GFR Estimate Latest Ref Range: >60 mL/min/1.73_m2 56 (L)   GFR Estimate If Black Latest Ref Range: >60 mL/min/1.73_m2 65   Calcium Latest Ref Range: 8.5 - 10.1 mg/dL 9.0   Anion Gap Latest Ref Range: 3 - 14 mmol/L 6   Magnesium Latest Ref Range: 1.6 - 2.3 mg/dL 1.8   Albumin Latest Ref Range: 3.4 - 5.0 g/dL 4.0   Protein Total Latest Ref Range: 6.8 - 8.8 g/dL 6.7 (L)   Bilirubin Total Latest Ref Range: 0.2 - 1.3 mg/dL 0.5   Alkaline Phosphatase Latest Ref Range: 40 - 150 U/L 70   ALT Latest Ref Range: 0 - 70 U/L 46   AST Latest Ref Range: 0 - 45 U/L 22   Bilirubin Direct Latest Ref Range: 0.0 - 0.2 mg/dL 0.1   Glucose Latest Ref Range: 70 - 99 mg/dL 92   WBC Latest Ref Range: 4.0 - 11.0 10e9/L 5.3   Hemoglobin Latest Ref Range: 13.3 - 17.7 g/dL 15.2   Hematocrit Latest Ref Range: 40.0 - 53.0 % 45.3   Platelet Count Latest Ref Range: 150 - 450 10e9/L 142 (L)   RBC Count Latest Ref Range: 4.4 - 5.9 10e12/L 5.14   MCV Latest Ref Range: 78 - 100 fl 88   MCH Latest Ref Range: 26.5 - 33.0 pg 29.6   MCHC Latest Ref Range: 31.5 - 36.5 g/dL 33.6   RDW Latest Ref Range: 10.0 - 15.0 % 13.4   Tacrolimus Last Dose Unknown 8/03/2020 2315   Tacrolimus Level Latest Ref Range: 5.0 - 15.0 ug/L 3.7 (L)           A/P: 40 yo man with history of PTLD dx in 2017 treated with Rituxan and then R-COEP X 4  with CR.     1. History of PTLD in ongoing remission 3.5 years out from chemo completion. Discussed that going forward we do clinical exams only and scan based on symptoms. No sign of recurrence on symptoms and patient report of exam. Visit and clinical exam in 6 months     2. Renal: creatinine stable in the mid 1.5 range  - stable immunosuppression with good Tac level     3.ID: no current infectious issues     4. Papillary Thyroid Cancer Status post resection: on synthroid. Follows with endocrine     Final Plan:  - [facility] with labs in 6 months    Olena Crenshaw MD

## 2020-08-18 ENCOUNTER — HOSPITAL ENCOUNTER (EMERGENCY)
Facility: CLINIC | Age: 40
Discharge: HOME | End: 2020-08-18
Attending: EMERGENCY MEDICINE | Admitting: EMERGENCY MEDICINE
Payer: COMMERCIAL

## 2020-08-18 VITALS
DIASTOLIC BLOOD PRESSURE: 89 MMHG | WEIGHT: 215 LBS | TEMPERATURE: 98.9 F | HEART RATE: 65 BPM | OXYGEN SATURATION: 97 % | BODY MASS INDEX: 30.78 KG/M2 | SYSTOLIC BLOOD PRESSURE: 134 MMHG | RESPIRATION RATE: 14 BRPM | HEIGHT: 70 IN

## 2020-08-18 DIAGNOSIS — R00.2 PALPITATIONS: ICD-10-CM

## 2020-08-18 LAB
ALBUMIN SERPL-MCNC: 4.4 G/DL (ref 3.4–5)
ALP SERPL-CCNC: 81 U/L (ref 40–150)
ALT SERPL W P-5'-P-CCNC: 35 U/L (ref 0–70)
ANION GAP SERPL CALCULATED.3IONS-SCNC: 4 MMOL/L (ref 3–14)
AST SERPL W P-5'-P-CCNC: 28 U/L (ref 0–45)
BILIRUB SERPL-MCNC: 0.5 MG/DL (ref 0.2–1.3)
BUN SERPL-MCNC: 15 MG/DL (ref 7–30)
CALCIUM SERPL-MCNC: 9.7 MG/DL (ref 8.5–10.1)
CHLORIDE SERPL-SCNC: 106 MMOL/L (ref 94–109)
CO2 SERPL-SCNC: 28 MMOL/L (ref 20–32)
CREAT SERPL-MCNC: 1.44 MG/DL (ref 0.66–1.25)
DIFFERENTIAL METHOD BLD: NORMAL
ERYTHROCYTE [DISTWIDTH] IN BLOOD BY AUTOMATED COUNT: NORMAL % (ref 10–15)
GFR SERPL CREATININE-BSD FRML MDRD: 60 ML/MIN/{1.73_M2}
GLUCOSE SERPL-MCNC: 90 MG/DL (ref 70–99)
HCT VFR BLD AUTO: NORMAL % (ref 40–53)
HGB BLD-MCNC: NORMAL G/DL (ref 13.3–17.7)
MCH RBC QN AUTO: NORMAL PG (ref 26.5–33)
MCHC RBC AUTO-ENTMCNC: NORMAL G/DL (ref 31.5–36.5)
MCV RBC AUTO: NORMAL FL (ref 78–100)
PLATELET # BLD AUTO: NORMAL 10E9/L (ref 150–450)
POTASSIUM SERPL-SCNC: 5 MMOL/L (ref 3.4–5.3)
PROT SERPL-MCNC: 8 G/DL (ref 6.8–8.8)
RBC # BLD AUTO: NORMAL 10E12/L (ref 4.4–5.9)
SODIUM SERPL-SCNC: 137 MMOL/L (ref 133–144)
TROPONIN I SERPL-MCNC: <0.015 UG/L (ref 0–0.04)
WBC # BLD AUTO: NORMAL 10E9/L (ref 4–11)

## 2020-08-18 PROCEDURE — 80053 COMPREHEN METABOLIC PANEL: CPT | Performed by: EMERGENCY MEDICINE

## 2020-08-18 PROCEDURE — 93010 ELECTROCARDIOGRAM REPORT: CPT | Mod: Z6 | Performed by: EMERGENCY MEDICINE

## 2020-08-18 PROCEDURE — 84484 ASSAY OF TROPONIN QUANT: CPT | Performed by: EMERGENCY MEDICINE

## 2020-08-18 PROCEDURE — 99284 EMERGENCY DEPT VISIT MOD MDM: CPT | Performed by: EMERGENCY MEDICINE

## 2020-08-18 PROCEDURE — 99284 EMERGENCY DEPT VISIT MOD MDM: CPT | Mod: 25 | Performed by: EMERGENCY MEDICINE

## 2020-08-18 PROCEDURE — 93005 ELECTROCARDIOGRAM TRACING: CPT | Performed by: EMERGENCY MEDICINE

## 2020-08-18 ASSESSMENT — ENCOUNTER SYMPTOMS
LIGHT-HEADEDNESS: 0
HEADACHES: 0
PALPITATIONS: 1
NECK STIFFNESS: 0
ARTHRALGIAS: 0
FEVER: 0
CONFUSION: 0
SHORTNESS OF BREATH: 0
EYE REDNESS: 0
ABDOMINAL PAIN: 0
COLOR CHANGE: 0
DIFFICULTY URINATING: 0

## 2020-08-18 ASSESSMENT — MIFFLIN-ST. JEOR: SCORE: 1896.48

## 2020-08-18 NOTE — ED TRIAGE NOTES
Pt. presents to ED from home with palpitations. Pt. has a hx (transposition of great arteries and arrhythmias). Was seen here 2 months ago for same feeling, cardiology encouraged them to go to ED. Pt. Reports intermittent palpitations now but had constant palpitations @ home for 40 mins. A & O x 4, independent, AVSS on RA.

## 2020-08-18 NOTE — ED PROVIDER NOTES
[place] EMERGENCY DEPARTMENT ([facility])  August 18, 2020  History     Chief Complaint   Patient presents with     Palpitations     The history is provided by the patient and medical records.     Cricket Chen is a 39 year old male with a PMH for atrial flutter, cardioversion, thyroid carcinoma, transposition of great vessels, HTN, s/p liver transplant, s/p renal transplant 2016, and atrial flutter who presents to the ED with complaint of heart palpitations.    Patient reports he was just sitting around today and started feeling palpitations/skipped heartbeats for 30 to 40 minutes.  He states instead of coming straight here he talked to his cardiologist and the nurse directed him to come to the ED.  He denies any recent changes to his medications.  He notes sleeping normally last night.  He denies taking any cold medication or alcohol use recently.  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 Crenshaw.  Patient is taking a blood thinner currently.  Patient denies lightheadedness, or chest pain.  He states he had a headache this afternoon but does not otherwise feel sick or tired.  He states the last time he had an episode of similar symptoms he had low sodium but this time when he checked into the clinic it was normal.  Patient usually uses Dr. Alba and Dr. Patricio George.        PAST MEDICAL HISTORY:   Past Medical History:   Diagnosis Date     Alcohol abuse     Last drink in Mid-April 2014     Anemia in ESRD (end-stage renal disease) (H)      Anxiety 2008     Atrial flutter (H) 2017     Cirrhosis (H)     S/P liver transplant     Depression      History of blood transfusion      History of transposition of great vessels     atrial switch at age 8 months old     Hypertension      Liver transplant recipient (H)     2016     Papillary thyroid carcinoma (H)      Pneumonia 11-15-14     Renal transplant recipient     2016     Varices, esophageal (H)         PAST SURGICAL HISTORY:   Past Surgical History:   Procedure Laterality Date     ANESTHESIA CARDIOVERSION N/A 3/7/2018    Procedure: ANESTHESIA CARDIOVERSION;  Cardioversion;  Surgeon: GENERIC ANESTHESIA PROVIDER;  Location: [facility] OR     BENCH LIVER N/A 5/10/2016    Procedure: BENCH LIVER;  Surgeon: Ricky Deshpande MD;  [facility] OR     BIOPSY LYMPH NODE CERVICAL Right 1/26/2017    Procedure: BIOPSY LYMPH NODE CERVICAL;  Surgeon: Beka Soto MD;  [facility]: [facility] OR     CARDIAC SURGERY  9-10-80     CREATE FISTULA ARTERIOVENOUS UPPER EXTREMITY Right 9/16/2014    Procedure: CREATE FISTULA ARTERIOVENOUS UPPER EXTREMITY;  Surgeon: Padmaja Eaton MD;  Location: [facility] OR     CV RIGHT HEART CATH N/A 4/19/2019    Procedure: CV RIGHT HEART CATH;  Surgeon: Sabi Wiggins MD;  Location:  HEART CARDIAC CATH LAB     CYSTOSCOPY, REMOVE STENT(S), COMBINED Right 6/22/2016    Procedure: COMBINED CYSTOSCOPY, REMOVE STENT(S);  Surgeon: Ricky Deshpande MD;  [facility]     EMBOLECTOMY UPPER EXTREMITY Right 8/17/2018    Procedure: EMBOLECTOMY UPPER EXTREMITY;  Repair Right Upper Arm Pseudo Aneursym ;  Surgeon: John Payton MD;  Location: [facility] OR     ENT SURGERY       ESOPHAGOSCOPY, GASTROSCOPY, DUODENOSCOPY (EGD), COMBINED  5/30/2014    Procedure: COMBINED ESOPHAGOSCOPY, GASTROSCOPY, DUODENOSCOPY (EGD);  Surgeon: Guillaume Bautista MD;  Location:  GI     ESOPHAGOSCOPY, GASTROSCOPY, DUODENOSCOPY (EGD), COMBINED  9/30/14     ESOPHAGOSCOPY, GASTROSCOPY, DUODENOSCOPY (EGD), COMBINED Left 3/12/2015    Procedure: COMBINED ESOPHAGOSCOPY, GASTROSCOPY, DUODENOSCOPY (EGD);  Surgeon: Laureen Galvan MD;  Location:  GI     ESOPHAGOSCOPY, GASTROSCOPY, DUODENOSCOPY (EGD), COMBINED N/A 4/21/2016    Procedure: COMBINED ESOPHAGOSCOPY, GASTROSCOPY, DUODENOSCOPY (EGD);  Surgeon: Laureen Galvan MD;  Location:  GI     ESOPHAGOSCOPY, GASTROSCOPY, DUODENOSCOPY (EGD), COMBINED N/A 7/21/2016     Procedure: COMBINED ESOPHAGOSCOPY, GASTROSCOPY, DUODENOSCOPY (EGD);  Surgeon: Laureen Galvan MD;  Location: [facility] GI     HC OR CATH ABLATION NON-CARDIAC ENDOVASCULAR      SVT     INSERT PORT VASCULAR ACCESS N/A 4/3/2017    Procedure: INSERT PORT VASCULAR ACCESS;  Surgeon: Rajendra Jacques PA-C;  [facility]     IR PORT REMOVAL LEFT  2019     LIGATE FISTULA ARTERIOVENOUS UPPER EXTREMITY Right 2017    Procedure: LIGATE FISTULA ARTERIOVENOUS UPPER EXTREMITY;  Revision of Right Arm Arteriovenous Fistula ;  Surgeon: Padmaja Eaton MD;  [facility]: [facility]     PERCUTANEOUS BIOPSY KIDNEY Right 2018    Procedure: PERCUTANEOUS BIOPSY KIDNEY;  Right Kidney Biopsy;  Surgeon: Yuval Khan MD;  [facility]     PERCUTANEOUS BIOPSY KIDNEY Right 10/30/2019    Procedure: Right Side Kidney Biopsy;  Surgeon: Jake Sidhu MD;  [facility]     PICC INSERTION  14    PICC line placement 14; Removal 2014     REMOVE PORT VASCULAR ACCESS Right 2019    Procedure: Right chest Port Removal;  Surgeon: Erasmo Ziegler PA-C;  [facility]     THORACIC SURGERY  1980    Transposition great arteries, repaired at 8 months     THYROIDECTOMY Right 2017    Procedure: THYROIDECTOMY;  Bilateral Total Thyroidectomy ;  Surgeon: Beka Soto MD;  [facility]     TRANSPLANT KIDNEY RECIPIENT  DONOR  5/10/2016    Procedure: TRANSPLANT KIDNEY RECIPIENT  DONOR;  Surgeon: Ricky Deshpande MD;  [facility]     TRANSPLANT LIVER RECIPIENT  DONOR N/A 5/10/2016    Procedure: TRANSPLANT LIVER RECIPIENT  DONOR;  Surgeon: Ricky Deshpande MD;  [facility]       Past medical history, past surgical history, medications, and allergies were reviewed with the patient. Additional pertinent items: None    FAMILY HISTORY:   Family History   Problem Relation Age of Onset     No Known Problems Brother      Arthritis Father       Hypertension Father      Hypertension Mother      Anxiety Disorder Mother      Hypertension Sister      No Known Problems Maternal Grandmother      No Known Problems Maternal Grandfather      No Known Problems Paternal Grandmother      No Known Problems Sister      No Known Problems Paternal Grandfather      Alcohol/Drug No family hx of      Gastrointestinal Disease No family hx of         no fam hx of liver disease or liver cancer       SOCIAL HISTORY:   Social History     Tobacco Use     Smoking status: Former Smoker     Packs/day: 0.33     Years: 3.00     Pack years: 0.99     Types: Cigarettes     Start date: 1997     Last attempt to quit: 2000     Years since quittin.9     Smokeless tobacco: Former User     Tobacco comment: Quit chewing in early    Substance Use Topics     Alcohol use: No     Alcohol/week: 0.0 standard drinks     Comment: ~10 drinks per day for ten years, quit in 2014     Social history was reviewed with the patient. Additional pertinent items: None      Discharge Medication List as of 2020 10:22 PM      CONTINUE these medications which have NOT CHANGED    Details   apixaban ANTICOAGULANT (ELIQUIS ANTICOAGULANT) 5 MG tablet Take 1 tablet (5 mg) by mouth 2 times daily, Disp-180 tablet,R-3, E-Prescribe      busPIRone (BUSPAR) 15 MG tablet Take one tablet (15 mg) twice daily for anxiety.Call clinic to schedule follow up appointment., Disp-180 tablet,R-1, E-Prescribe      desonide (DESOWEN) 0.05 % ointment Apply topically 2 times dailyDisp-15 g, [id number]      hydrOXYzine (ATARAX) 50 MG tablet Take 1 tablet (50 mg) by mouth at bedtime as needed, may repeat once for itching, Disp-90 tablet,R-0, E-Prescribe      ketoconazole (NIZORAL) 2 % cream Twice daily to areas of rash on faceDisp-60 g, [id number]      ketoconazole (NIZORAL) 2 % shampoo Use as a foaming face wash in shower dailyDisp-120 mL, [id number]      levothyroxine (SYNTHROID/LEVOTHROID) 137 MCG  tablet Take 1 tablet by mouth Monday - Saturday  and 1.5 tablets on Sunday, Disp-96 tablet, R-3, E-Prescribe      lisinopril (ZESTRIL) 10 MG tablet TAKE ONE TABLET BY MOUTH ONCE DAILY, Disp-90 tablet,R-1, E-Prescribe      magnesium oxide (MAG-OX) 400 MG tablet TAKE ONE TABLET BY MOUTH TWICE A DAY, Disp-120 tablet, R-11, E-Prescribe      metoprolol succinate ER (TOPROL-XL) 25 MG 24 hr tablet Take 1 tablet (25 mg) by mouth daily, Disp-90 tablet, R-3, E-Prescribe      mycophenolate (GENERIC EQUIVALENT) 250 MG capsule Take 2 capsules (500 mg) by mouth 2 times daily, Disp-120 capsule,R-11, E-Prescribe      sulfamethoxazole-trimethoprim (BACTRIM/SEPTRA) 400-80 MG tablet Take 1 tablet by mouth daily, Disp-30 tablet, R-11, E-Prescribe      tacrolimus (GENERIC EQUIVALENT) 0.5 MG capsule Take 1 capsule (0.5 mg) by mouth every morning Take with 1mg tabs, total of 1.5mg am, 1mg pm, Disp-90 capsule,R-3, E-Prescribe      tacrolimus (GENERIC EQUIVALENT) 1 MG capsule Take 1 capsule (1 mg) by mouth every 12 hours Take with .5 mg tabs, total of 1.5mg am, 1mg pm, Disp-60 capsule,R-11, E-Prescribe                Allergies   Allergen Reactions     Hydromorphone Itching        Review of Systems   Constitutional: Negative for fever.   HENT: Negative for congestion.    Eyes: Negative for redness.   Respiratory: Negative for shortness of breath.    Cardiovascular: Positive for palpitations. Negative for chest pain.   Gastrointestinal: Negative for abdominal pain.   Genitourinary: Negative for difficulty urinating.   Musculoskeletal: Negative for arthralgias and neck stiffness.   Skin: Negative for color change.   Neurological: Negative for light-headedness and headaches.   Psychiatric/Behavioral: Negative for confusion.   All other systems reviewed and are negative.    A complete review of systems was performed with pertinent positives and negatives noted in the HPI, and all other systems negative.    Physical Exam   BP: 136/86  Pulse:  "62  RETIRE: Heart Rate: 77  Temp: 98.9  F (37.2  C)  Resp: 16  Height: 177.8 cm (5' 10\")  Weight: 97.5 kg (215 lb)  SpO2: 97 %  9:44 PM  The patient was seen and examined by Dr. Blaine Forman in Room ED28.      Physical Exam  Vitals signs and nursing note reviewed.   Constitutional:       General: He is not in acute distress.  HENT:      Head: Atraumatic.      Mouth/Throat:      Mouth: Mucous membranes are moist.   Eyes:      General: No scleral icterus.     Extraocular Movements: Extraocular movements intact.   Neck:      Musculoskeletal: Neck supple.   Cardiovascular:      Rate and Rhythm: Normal rate and regular rhythm.      Heart sounds: Normal heart sounds.   Pulmonary:      Effort: Pulmonary effort is normal. No respiratory distress.   Chest:      Chest wall: No tenderness.   Abdominal:      Palpations: Abdomen is soft.      Tenderness: There is no abdominal tenderness. There is no guarding or rebound.   Musculoskeletal:      Right lower leg: No edema.      Left lower leg: No edema.   Skin:     General: Skin is warm.      Coloration: Skin is not pale.   Neurological:      General: No focal deficit present.      Mental Status: He is alert and oriented to person, place, and time.         ED Course        Procedures              EKG Interpretation:      Interpreted by Blaine Forman  Time reviewed: 1945  Symptoms at time of EKG: Palpitations   Rhythm: Normal sinus   Rate: 68 bpm  Axis: Normal  Ectopy: None  Conduction: Right bundle branch block (incomplete) and right ventricular hypertrophy  ST Segments/ T Waves: ST & T wave abnormality, consider anterolateral ischemia  Q Waves: None  Comparison to prior: unchanged    Clinical Impression: Inverted T waves V1 through V5                         Results for orders placed or performed during the hospital encounter of 08/18/20 (from the past 24 hour(s))   Comprehensive metabolic panel   Result Value Ref Range    Sodium 137 133 - 144 mmol/L    " Potassium 5.0 3.4 - 5.3 mmol/L    Chloride 106 94 - 109 mmol/L    Carbon Dioxide 28 20 - 32 mmol/L    Anion Gap 4 3 - 14 mmol/L    Glucose 90 70 - 99 mg/dL    Urea Nitrogen 15 7 - 30 mg/dL    Creatinine 1.44 (H) 0.66 - 1.25 mg/dL    GFR Estimate 60 (L) >60 mL/min/[1.73_m2]    GFR Estimate If Black 70 >60 mL/min/[1.73_m2]    Calcium 9.7 8.5 - 10.1 mg/dL    Bilirubin Total 0.5 0.2 - 1.3 mg/dL    Albumin 4.4 3.4 - 5.0 g/dL    Protein Total 8.0 6.8 - 8.8 g/dL    Alkaline Phosphatase 81 40 - 150 U/L    ALT 35 0 - 70 U/L    AST 28 0 - 45 U/L   CBC with platelets differential   Result Value Ref Range    WBC Unsatisfactory specimen - clotted 4.0 - 11.0 10e9/L    RBC Count Unsatisfactory specimen - clotted 4.4 - 5.9 10e12/L    Hemoglobin Unsatisfactory specimen - clotted 13.3 - 17.7 g/dL    Hematocrit Unsatisfactory specimen - clotted 40.0 - 53.0 %    MCV Unsatisfactory specimen - clotted 78 - 100 fl    MCH Unsatisfactory specimen - clotted 26.5 - 33.0 pg    MCHC Unsatisfactory specimen - clotted 31.5 - 36.5 g/dL    RDW Unsatisfactory specimen - clotted 10.0 - 15.0 %    Platelet Count Unsatisfactory specimen - clotted 150 - 450 10e9/L    Diff Method Unsatisfactory specimen - clotted    Troponin I   Result Value Ref Range    Troponin I ES <0.015 0.000 - 0.045 ug/L   EKG 12-lead, tracing only   Result Value Ref Range    Interpretation ECG Click View Image link to view waveform and result      *Note: Due to a large number of results and/or encounters for the requested time period, some results have not been displayed. A complete set of results can be found in Results Review.     Medications - No data to display          Assessments & Plan (with Medical Decision Making)   The patient has symptoms of skipped beat without any presyncopal or ischemic symptoms.  He has no arrhythmias in the ED and remained in sinus rhythm while monitored.  His EKG is unchanged.  His electrolytes are normal.  Per the request in his problem list I  spoke with Dr. Wiggins from the congenital heart disease service.  She will try to pass on his information to arrange for an event monitor and follow-up.  His symptoms are likely PVCs although reviewing his past a Zio patch report from March he does have occasional ventricular ectopy without any sustained arrhythmias.  The patient is comfortable with this and will return if he has any worse symptoms.  We went over how to self monitor his pulse for rate and irregularity and he feels comfortable with that.    I have reviewed the nursing notes.    I have reviewed the findings, diagnosis, plan and need for follow up with the patient.    Discharge Medication List as of 8/18/2020 10:22 PM          Final diagnoses:   Palpitations     I, Claudio Patrick, am serving as a trained medical scribe to document services personally performed by Blaine Forman MD, based on the provider's statements to me.     IBlaine MD, was physically present and have reviewed and verified the accuracy of this note documented by Claudio Patrick.    8/18/2020   [facility], [place], EMERGENCY DEPARTMENT     Blaine Forman MD  08/19/20 1057

## 2020-08-18 NOTE — ED AVS SNAPSHOT
[facility], [place], Emergency Department  [address] [place] [id number]  Phone:  [phone]                                    Cricket Chen   MRN: [id number]    Department:  [facility], Emergency Department   Date of Visit:  8/18/2020           After Visit Summary Signature Page    I have received my discharge instructions, and my questions have been answered. I have discussed any challenges I see with this plan with the nurse or doctor.    ..........................................................................................................................................  Patient/Patient Representative Signature      ..........................................................................................................................................  Patient Representative Print Name and Relationship to Patient    ..................................................               ................................................  Date                                   Time    ..........................................................................................................................................  Reviewed by Signature/Title    ...................................................              ..............................................  Date                                               Time          22EPIC Rev 08/18

## 2020-08-19 LAB — INTERPRETATION ECG - MUSE: NORMAL

## 2020-08-19 NOTE — DISCHARGE INSTRUCTIONS
Please make an appointment to follow up with Cardiology Clinic (phone: [phone]) as soon as possible for event monitor.    Return if light headed, chest pain or ongoing fast heart rate.

## 2020-08-20 ENCOUNTER — HOSPITAL ENCOUNTER (EMERGENCY)
Facility: CLINIC | Age: 40
Discharge: HOME | End: 2020-08-20
Attending: EMERGENCY MEDICINE | Admitting: EMERGENCY MEDICINE
Payer: COMMERCIAL

## 2020-08-20 ENCOUNTER — APPOINTMENT (OUTPATIENT)
Dept: GENERAL RADIOLOGY | Facility: CLINIC | Age: 40
End: 2020-08-20
Attending: EMERGENCY MEDICINE
Payer: COMMERCIAL

## 2020-08-20 ENCOUNTER — NURSE TRIAGE (OUTPATIENT)
Dept: NURSING | Facility: CLINIC | Age: 40
End: 2020-08-20

## 2020-08-20 VITALS
TEMPERATURE: 98.4 F | HEIGHT: 70 IN | HEART RATE: 79 BPM | SYSTOLIC BLOOD PRESSURE: 123 MMHG | BODY MASS INDEX: 30.85 KG/M2 | OXYGEN SATURATION: 98 % | DIASTOLIC BLOOD PRESSURE: 82 MMHG | RESPIRATION RATE: 16 BRPM

## 2020-08-20 DIAGNOSIS — R79.89 ELEVATED SERUM CREATININE: ICD-10-CM

## 2020-08-20 DIAGNOSIS — R00.2 PALPITATIONS: ICD-10-CM

## 2020-08-20 LAB
ALBUMIN SERPL-MCNC: 4.1 G/DL (ref 3.4–5)
ALP SERPL-CCNC: 75 U/L (ref 40–150)
ALT SERPL W P-5'-P-CCNC: 42 U/L (ref 0–70)
ANION GAP SERPL CALCULATED.3IONS-SCNC: 5 MMOL/L (ref 3–14)
AST SERPL W P-5'-P-CCNC: 25 U/L (ref 0–45)
BASOPHILS # BLD AUTO: 0 10E9/L (ref 0–0.2)
BASOPHILS NFR BLD AUTO: 0.4 %
BILIRUB SERPL-MCNC: 0.6 MG/DL (ref 0.2–1.3)
BUN SERPL-MCNC: 21 MG/DL (ref 7–30)
CALCIUM SERPL-MCNC: 9.2 MG/DL (ref 8.5–10.1)
CHLORIDE SERPL-SCNC: 105 MMOL/L (ref 94–109)
CO2 SERPL-SCNC: 28 MMOL/L (ref 20–32)
CREAT SERPL-MCNC: 1.56 MG/DL (ref 0.66–1.25)
DIFFERENTIAL METHOD BLD: NORMAL
EOSINOPHIL # BLD AUTO: 0 10E9/L (ref 0–0.7)
EOSINOPHIL NFR BLD AUTO: 0.6 %
ERYTHROCYTE [DISTWIDTH] IN BLOOD BY AUTOMATED COUNT: 12.7 % (ref 10–15)
GFR SERPL CREATININE-BSD FRML MDRD: 55 ML/MIN/{1.73_M2}
GLUCOSE SERPL-MCNC: 89 MG/DL (ref 70–99)
HCT VFR BLD AUTO: 47.9 % (ref 40–53)
HGB BLD-MCNC: 16 G/DL (ref 13.3–17.7)
IMM GRANULOCYTES # BLD: 0 10E9/L (ref 0–0.4)
IMM GRANULOCYTES NFR BLD: 0.1 %
LYMPHOCYTES # BLD AUTO: 1.1 10E9/L (ref 0.8–5.3)
LYMPHOCYTES NFR BLD AUTO: 15.9 %
MAGNESIUM SERPL-MCNC: 1.9 MG/DL (ref 1.6–2.3)
MCH RBC QN AUTO: 29.3 PG (ref 26.5–33)
MCHC RBC AUTO-ENTMCNC: 33.4 G/DL (ref 31.5–36.5)
MCV RBC AUTO: 88 FL (ref 78–100)
MONOCYTES # BLD AUTO: 0.5 10E9/L (ref 0–1.3)
MONOCYTES NFR BLD AUTO: 6.6 %
NEUTROPHILS # BLD AUTO: 5.2 10E9/L (ref 1.6–8.3)
NEUTROPHILS NFR BLD AUTO: 76.4 %
NRBC # BLD AUTO: 0 10*3/UL
NRBC BLD AUTO-RTO: 0 /100
PLATELET # BLD AUTO: 170 10E9/L (ref 150–450)
POTASSIUM SERPL-SCNC: 4 MMOL/L (ref 3.4–5.3)
PROT SERPL-MCNC: 7.4 G/DL (ref 6.8–8.8)
RBC # BLD AUTO: 5.46 10E12/L (ref 4.4–5.9)
SODIUM SERPL-SCNC: 138 MMOL/L (ref 133–144)
TROPONIN I SERPL-MCNC: <0.015 UG/L (ref 0–0.04)
TSH SERPL DL<=0.005 MIU/L-ACNC: 0.44 MU/L (ref 0.4–4)
WBC # BLD AUTO: 6.8 10E9/L (ref 4–11)

## 2020-08-20 PROCEDURE — 71046 X-RAY EXAM CHEST 2 VIEWS: CPT

## 2020-08-20 PROCEDURE — 84443 ASSAY THYROID STIM HORMONE: CPT | Performed by: EMERGENCY MEDICINE

## 2020-08-20 PROCEDURE — 99285 EMERGENCY DEPT VISIT HI MDM: CPT | Mod: 25 | Performed by: EMERGENCY MEDICINE

## 2020-08-20 PROCEDURE — 99285 EMERGENCY DEPT VISIT HI MDM: CPT | Mod: 25

## 2020-08-20 PROCEDURE — 84484 ASSAY OF TROPONIN QUANT: CPT | Performed by: EMERGENCY MEDICINE

## 2020-08-20 PROCEDURE — 93010 ELECTROCARDIOGRAM REPORT: CPT | Mod: Z6 | Performed by: EMERGENCY MEDICINE

## 2020-08-20 PROCEDURE — 80053 COMPREHEN METABOLIC PANEL: CPT | Performed by: EMERGENCY MEDICINE

## 2020-08-20 PROCEDURE — 83735 ASSAY OF MAGNESIUM: CPT | Performed by: EMERGENCY MEDICINE

## 2020-08-20 PROCEDURE — 93005 ELECTROCARDIOGRAM TRACING: CPT

## 2020-08-20 PROCEDURE — 85025 COMPLETE CBC W/AUTO DIFF WBC: CPT | Performed by: EMERGENCY MEDICINE

## 2020-08-20 NOTE — TELEPHONE ENCOUNTER
Pt calls in with concern of palpitations   Pt is VERY anxious about     Was just seen >    8/18/2020 (1 hours)  [facility], Emergency Department   Palpitations   Clinical impression   Blaine Forman MD   Last attending   Treatment team       Pt with complex HX > PMH for atrial flutter, cardioversion, thyroid carcinoma, transposition of great vessels, HTN, s/p liver transplant, s/p renal transplant 2016, and atrial flutter    In note I also see > His symptoms are likely PVCs although reviewing his past a Zio patch report from March he does have occasional ventricular ectopy without any sustained arrhythmias.   ( see rest of ED not )    After discussion - pt is most comfortable - jessica with his HX - going back to ED for re-evaluation     Which is what he will do now    Protocol and care advice reviewed  Caller states understanding of the recommended disposition  Advised to call back if further questions or concerns    Eduardo Sow , RN / [facility]                      Additional Information    Negative: Passed out (i.e., lost consciousness, collapsed and was not responding)    Negative: Shock suspected (e.g., cold/pale/clammy skin, too weak to stand, low BP, rapid pulse)    Negative: Difficult to awaken or acting confused (e.g., disoriented, slurred speech)    Negative: Visible sweat on face or sweat dripping down face    Negative: Unable to walk, or can only walk with assistance (e.g., requires support)    Negative: [1] Received SHOCK from implantable cardiac defibrillator AND [2] persisting symptoms (i.e., palpitations, lightheadedness)    Negative: Sounds like a life-threatening emergency to the triager    [1] Skipped or extra beat(s) AND [2] occurs 4 or more times per minute    Negative: Chest pain    Negative: Difficulty breathing    Negative: Dizziness, lightheadedness, or weakness    Negative: [1] Heart beating very rapidly (e.g., > 140 / minute) AND [2] present now  (Exception:  during exercise)    Negative: Heart beating very slowly (e.g., < 50 / minute)  (Exception: athlete)    Negative: New or worsened shortness of breath with activity (dyspnea on exertion)    Negative: Patient sounds very sick or weak to the triager    Negative: [1] Heart beating very rapidly (e.g., > 140 / minute) AND [2] not present now  (Exception: during exercise)    Negative: [1] Skipped or extra beat(s) AND [2] increases with exercise or exertion    Protocols used: HEART RATE AND HEARTBEAT [id number]

## 2020-08-20 NOTE — ED AVS SNAPSHOT
[facility], [place], Emergency Department  [address] [place] [id number]  Phone:  [phone]                                    Cricket Chen   MRN: [id number]    Department:  [facility], Emergency Department   Date of Visit:  8/20/2020           After Visit Summary Signature Page    I have received my discharge instructions, and my questions have been answered. I have discussed any challenges I see with this plan with the nurse or doctor.    ..........................................................................................................................................  Patient/Patient Representative Signature      ..........................................................................................................................................  Patient Representative Print Name and Relationship to Patient    ..................................................               ................................................  Date                                   Time    ..........................................................................................................................................  Reviewed by Signature/Title    ...................................................              ..............................................  Date                                               Time          22EPIC Rev 08/18

## 2020-08-20 NOTE — ED TRIAGE NOTES
Pt c/o palpitations that started when at rest. States that he was recently here for the same thing in which he was told to follow up with his cardiologist.

## 2020-08-21 ENCOUNTER — CARE COORDINATION (OUTPATIENT)
Dept: CARDIOLOGY | Facility: CLINIC | Age: 40
End: 2020-08-21

## 2020-08-21 ENCOUNTER — ANCILLARY PROCEDURE (OUTPATIENT)
Dept: CARDIOLOGY | Facility: CLINIC | Age: 40
End: 2020-08-21
Attending: EMERGENCY MEDICINE
Payer: COMMERCIAL

## 2020-08-21 DIAGNOSIS — R00.2 PALPITATIONS: ICD-10-CM

## 2020-08-21 DIAGNOSIS — I48.92 ATRIAL FLUTTER, UNSPECIFIED TYPE (H): ICD-10-CM

## 2020-08-21 PROCEDURE — 0298T LEADLESS EKG MONITOR 3 TO 14 DAYS: CPT | Mod: ZP | Performed by: INTERNAL MEDICINE

## 2020-08-21 PROCEDURE — 0296T LEADLESS EKG MONITOR 3 TO 14 DAYS: CPT | Mod: ZF

## 2020-08-21 RX ORDER — METOPROLOL SUCCINATE 25 MG/1
25 TABLET, EXTENDED RELEASE ORAL 2 TIMES DAILY
Qty: 360 TABLET | Refills: 3 | COMMUNITY
Start: 2020-08-21 | End: 2020-10-01

## 2020-08-21 NOTE — PROGRESS NOTES
Date: 8/21/2020    Time of Call: 2:21 PM     Diagnosis:  Palpitations     [ TORB ] Ordering provider: Dr Jaylen George  Order: Increase dose of Metoprolol XL to 25 mg twice a day.     Order received by: Gagandeep Corado RN     Follow-up/additional notes: Called Cricket with recommendations from Dr George.  Cricket states that he is feeling ok right now, his last episode of palpitations was last evening prior to going to the ER.  He notes that he was sitting on the couch when he started to feel the skipped beats/ pauses in his chest.  This lasted for 10-15 minutes.  He also notes that he felt slightly dizzy when standing and had some shortness of breath with activity. He denies any caffeine use yesterday.  Discussed with patient that the increase in his Metoprolol will hopefully help with his symptoms.  Provided the patient our on call number for the [facility] physicians and discussed touching base with him next week to see how he feels. Cricket states that he understands information provided and will call with further questions or concerns.

## 2020-08-21 NOTE — PROGRESS NOTES
Per Dr. Hightower, patient to have 14 day Zio monitor placed.  Diagnosis: Palpitations   Monitor placed: Yes  Patient Instructed: Yes  Patient verbalized understanding: Yes  Holter # [id number]   Francis BLANK

## 2020-08-21 NOTE — ED PROVIDER NOTES
History     Chief Complaint   Patient presents with     Palpitations     HPI  Cricket Chen is a 39 year old male with a past medical history of transposition of the great vessels, alcoholic hepatitis status post liver and kidney transplant (on immunosuppression), chronic pain, esophageal varices, atrial flutter, thrombocytopenia who presents to the emergency department with a chief complaint of palpitations.  The patient states that he has a history of atrial flutter, but has not had issues with this recently.  Over the last several months, he has noted palpitations that are different from anything he has experienced in the past.  He notes he did have a ZIO Patch back in March but was not informed of any abnormal results.  Over the last week or so he has had worsening palpitations that feel like his heart is occasionally skipping a beat.  He denies any associated chest pain, shortness of breath, or leg swelling.  Patient does state he did feel lightheaded earlier today.  No syncopal episode.  The patient was seen in the emergency department several days ago and had a work-up that was normal.  He was advised to follow-up with his cardiologist.  His symptoms have currently resolved.  The patient is currently anticoagulated on Eliquis.    I have reviewed the Medications, Allergies, Past Medical and Surgical History, and Social History in the [employer] system.    Past Medical History:   Diagnosis Date     Alcohol abuse     Last drink in Mid-April 2014     Anemia in ESRD (end-stage renal disease) (H)      Anxiety 2008     Atrial flutter (H) 2017     Cirrhosis (H)     S/P liver transplant     Depression      History of blood transfusion      History of transposition of great vessels     atrial switch at age 8 months old     Hypertension      Liver transplant recipient (H)     2016     Papillary thyroid carcinoma (H)      Pneumonia 11-15-14     Renal transplant recipient     2016     Varices, esophageal (H)      Past  Surgical History:   Procedure Laterality Date     ANESTHESIA CARDIOVERSION N/A 3/7/2018    Procedure: ANESTHESIA CARDIOVERSION;  Cardioversion;  Surgeon: GENERIC ANESTHESIA PROVIDER;  Location: [facility]     BENCH LIVER N/A 5/10/2016    Procedure: BENCH LIVER;  Surgeon: Ricky Deshpande MD;  [facility]: [facility]     BIOPSY LYMPH NODE CERVICAL Right 1/26/2017    Procedure: BIOPSY LYMPH NODE CERVICAL;  Surgeon: Beka Soto MD;  [facility]     CARDIAC SURGERY  9-10-80     CREATE FISTULA ARTERIOVENOUS UPPER EXTREMITY Right 9/16/2014    Procedure: CREATE FISTULA ARTERIOVENOUS UPPER EXTREMITY;  Surgeon: Padmaja Eaton MD;  [facility]: [facility] OR     CV RIGHT HEART CATH N/A 4/19/2019    Procedure: CV RIGHT HEART CATH;  Surgeon: Sabi Wiggins MD;  Location:  HEART CARDIAC CATH LAB     CYSTOSCOPY, REMOVE STENT(S), COMBINED Right 6/22/2016    Procedure: COMBINED CYSTOSCOPY, REMOVE STENT(S);  Surgeon: Ricky Deshpande MD;  [facility]     EMBOLECTOMY UPPER EXTREMITY Right 8/17/2018    Procedure: EMBOLECTOMY UPPER EXTREMITY;  Repair Right Upper Arm Pseudo Aneursym ;  Surgeon: John Payton MD;  Location: [facility]     ENT SURGERY       ESOPHAGOSCOPY, GASTROSCOPY, DUODENOSCOPY (EGD), COMBINED  5/30/2014    Procedure: COMBINED ESOPHAGOSCOPY, GASTROSCOPY, DUODENOSCOPY (EGD);  Surgeon: Guillaume Bautista MD;  Location:  GI     ESOPHAGOSCOPY, GASTROSCOPY, DUODENOSCOPY (EGD), COMBINED  9/30/14     ESOPHAGOSCOPY, GASTROSCOPY, DUODENOSCOPY (EGD), COMBINED Left 3/12/2015    Procedure: COMBINED ESOPHAGOSCOPY, GASTROSCOPY, DUODENOSCOPY (EGD);  Surgeon: Laureen Galvan MD;  Location:  GI     ESOPHAGOSCOPY, GASTROSCOPY, DUODENOSCOPY (EGD), COMBINED N/A 4/21/2016    Procedure: COMBINED ESOPHAGOSCOPY, GASTROSCOPY, DUODENOSCOPY (EGD);  Surgeon: Laureen Galvan MD;  Location:  GI     ESOPHAGOSCOPY, GASTROSCOPY, DUODENOSCOPY (EGD), COMBINED N/A 7/21/2016    Procedure: COMBINED ESOPHAGOSCOPY,  GASTROSCOPY, DUODENOSCOPY (EGD);  Surgeon: Laureen Galvan MD;  Location: [facility] GI     HC OR CATH ABLATION NON-CARDIAC ENDOVASCULAR      SVT     INSERT PORT VASCULAR ACCESS N/A 4/3/2017    Procedure: INSERT PORT VASCULAR ACCESS;  Surgeon: Rajendra Jacques PA-C;  [facility]     IR PORT REMOVAL LEFT  2019     LIGATE FISTULA ARTERIOVENOUS UPPER EXTREMITY Right 2017    Procedure: LIGATE FISTULA ARTERIOVENOUS UPPER EXTREMITY;  Revision of Right Arm Arteriovenous Fistula ;  Surgeon: Padmaja Eaton MD;  [facility]: [facility]     PERCUTANEOUS BIOPSY KIDNEY Right 2018    Procedure: PERCUTANEOUS BIOPSY KIDNEY;  Right Kidney Biopsy;  Surgeon: Yuval Khan MD;  [facility]     PERCUTANEOUS BIOPSY KIDNEY Right 10/30/2019    Procedure: Right Side Kidney Biopsy;  Surgeon: Jake Sidhu MD;  [facility]     PICC INSERTION  14    PICC line placement 14; Removal 2014     REMOVE PORT VASCULAR ACCESS Right 2019    Procedure: Right chest Port Removal;  Surgeon: Erasmo Ziegler PA-C;  Location:  OR     THORACIC SURGERY  1980    Transposition great arteries, repaired at 8 months     THYROIDECTOMY Right 2017    Procedure: THYROIDECTOMY;  Bilateral Total Thyroidectomy ;  Surgeon: Beka Soto MD;  [facility]     TRANSPLANT KIDNEY RECIPIENT  DONOR  5/10/2016    Procedure: TRANSPLANT KIDNEY RECIPIENT  DONOR;  Surgeon: Ricky Deshpande MD;  Location:  OR     TRANSPLANT LIVER RECIPIENT  DONOR N/A 5/10/2016    Procedure: TRANSPLANT LIVER RECIPIENT  DONOR;  Surgeon: Ricky Deshpande MD;  [facility]     No current facility-administered medications for this encounter.      Current Outpatient Medications   Medication     apixaban ANTICOAGULANT (ELIQUIS ANTICOAGULANT) 5 MG tablet     metoprolol succinate ER (TOPROL-XL) 25 MG 24 hr tablet     mycophenolate (GENERIC EQUIVALENT) 250 MG capsule      sulfamethoxazole-trimethoprim (BACTRIM/SEPTRA) 400-80 MG tablet     tacrolimus (GENERIC EQUIVALENT) 0.5 MG capsule     tacrolimus (GENERIC EQUIVALENT) 1 MG capsule     busPIRone (BUSPAR) 15 MG tablet     desonide (DESOWEN) 0.05 % ointment     hydrOXYzine (ATARAX) 50 MG tablet     ketoconazole (NIZORAL) 2 % cream     ketoconazole (NIZORAL) 2 % shampoo     levothyroxine (SYNTHROID/LEVOTHROID) 137 MCG tablet     lisinopril (ZESTRIL) 10 MG tablet     magnesium oxide (MAG-OX) 400 MG tablet     Allergies   Allergen Reactions     Hydromorphone Itching     Past medical history, past surgical history, medications, and allergies were reviewed with the patient. Additional pertinent items: None    Social History     Socioeconomic History     Marital status: Single     Spouse name: Not on file     Number of children: 0     Years of education: Not on file     Highest education level: Not on file   Occupational History     Employer: UNEMPLOYED   Social Needs     Financial resource strain: Not on file     Food insecurity     Worry: Not on file     Inability: Not on file     Transportation needs     Medical: Not on file     Non-medical: Not on file   Tobacco Use     Smoking status: Former Smoker     Packs/day: 0.33     Years: 3.00     Pack years: 0.99     Types: Cigarettes     Start date: 1997     Last attempt to quit: 2000     Years since quittin.9     Smokeless tobacco: Former User     Tobacco comment: Quit chewing in early    Substance and Sexual Activity     Alcohol use: No     Alcohol/week: 0.0 standard drinks     Comment: ~10 drinks per day for ten years, quit in 2014     Drug use: No     Sexual activity: Not Currently     Partners: Female     Birth control/protection: None   Lifestyle     Physical activity     Days per week: Not on file     Minutes per session: Not on file     Stress: Not on file   Relationships     Social connections     Talks on phone: Not on file     Gets together: Not on file  "    Attends Denominational service: Not on file     Active member of club or organization: Not on file     Attends meetings of clubs or organizations: Not on file     Relationship status: Not on file     Intimate partner violence     Fear of current or ex partner: Not on file     Emotionally abused: Not on file     Physically abused: Not on file     Forced sexual activity: Not on file   Other Topics Concern     Parent/sibling w/ CABG, MI or angioplasty before 65F 55M? Not Asked   Social History Narrative    ? Blood transfusion as baby during surgery,    Professional performed tattoos     No Illicit IV or intranasal drug use.      Social history was reviewed with the patient. Additional pertinent items: None    Review of Systems  General: No fevers or chills  Skin: No rash or diaphoresis  Eyes: No eye redness or discharge  Ears/Nose/Throat: No rhinorrhea or nasal congestion  Respiratory: No cough or SOB  Cardiovascular: No chest pain, positive for palpitations  Gastrointestinal: No nausea, vomiting, or diarrhea  Genitourinary: No urinary frequency, hematuria, or dysuria  Musculoskeletal: No arthralgias or myalgias  Neurologic: No numbness or weakness  Hematologic/Lymphatic/Immunologic: No leg swelling, positive for anticoagulation on Eliquis  Endocrine: No polyuria/polydypsia    A complete review of systems was performed with pertinent positives and negatives noted in the HPI, and all other systems negative.    Physical Exam   BP: 120/78  Temp: 98.4  F (36.9  C)  Resp: 16  Height: 177.8 cm (5' 10\")  SpO2: 98 %      General: Well nourished, well developed, NAD  HEENT: EOMI, anicteric. NCAT, MMM  Neck: no jugular venous distension, supple, nl ROM  Cardiac: Regular rate and rhythm. No murmurs, rubs, or gallops. Normal S1, S2.  Intact peripheral pulses  Pulm: CTAB, no stridor, wheezes, rales, rhonchi  Abd: Soft, nontender, nondistended.  No masses palpated.    Skin: Warm and dry to the touch.  No rash  Extremities: No LE " edema, no cyanosis, w/w/p  Neuro: A&Ox3, no gross focal deficits    ED Course        Procedures             EKG Interpretation:      Interpreted by Catherine Hightower MD  Time reviewed: 1945  Symptoms at time of EKG: Palpitations  Rhythm: normal sinus   Rate: normal  Axis: normal  Ectopy: none  Conduction: Incomplete right bundle branch block  RVH  ST Segments/ T Waves: T wave inversions in anterior lateral leads, unchanged from prior  Q Waves: none  Comparison to prior: Unchanged from 8/18/2020 and 6/8/2020    Clinical Impression: normal EKG                        Labs Ordered and Resulted from Time of ED Arrival Up to the Time of Departure from the ED   COMPREHENSIVE METABOLIC PANEL - Abnormal; Notable for the following components:       Result Value    Creatinine 1.56 (*)     GFR Estimate 55 (*)     All other components within normal limits   CBC WITH PLATELETS DIFFERENTIAL   TROPONIN I   MAGNESIUM   TSH WITH FREE T4 REFLEX            Results for orders placed or performed during the hospital encounter of 08/20/20 (from the past 24 hour(s))   EKG 12 lead   Result Value Ref Range    Interpretation ECG Click View Image link to view waveform and result    CBC with platelets differential   Result Value Ref Range    WBC 6.8 4.0 - 11.0 10e9/L    RBC Count 5.46 4.4 - 5.9 10e12/L    Hemoglobin 16.0 13.3 - 17.7 g/dL    Hematocrit 47.9 40.0 - 53.0 %    MCV 88 78 - 100 fl    MCH 29.3 26.5 - 33.0 pg    MCHC 33.4 31.5 - 36.5 g/dL    RDW 12.7 10.0 - 15.0 %    Platelet Count 170 150 - 450 10e9/L    Diff Method Automated Method     % Neutrophils 76.4 %    % Lymphocytes 15.9 %    % Monocytes 6.6 %    % Eosinophils 0.6 %    % Basophils 0.4 %    % Immature Granulocytes 0.1 %    Nucleated RBCs 0 0 /100    Absolute Neutrophil 5.2 1.6 - 8.3 10e9/L    Absolute Lymphocytes 1.1 0.8 - 5.3 10e9/L    Absolute Monocytes 0.5 0.0 - 1.3 10e9/L    Absolute Eosinophils 0.0 0.0 - 0.7 10e9/L    Absolute Basophils 0.0 0.0 - 0.2 10e9/L    Abs  Immature Granulocytes 0.0 0 - 0.4 10e9/L    Absolute Nucleated RBC 0.0    Comprehensive metabolic panel   Result Value Ref Range    Sodium 138 133 - 144 mmol/L    Potassium 4.0 3.4 - 5.3 mmol/L    Chloride 105 94 - 109 mmol/L    Carbon Dioxide 28 20 - 32 mmol/L    Anion Gap 5 3 - 14 mmol/L    Glucose 89 70 - 99 mg/dL    Urea Nitrogen 21 7 - 30 mg/dL    Creatinine 1.56 (H) 0.66 - 1.25 mg/dL    GFR Estimate 55 (L) >60 mL/min/[1.73_m2]    GFR Estimate If Black 63 >60 mL/min/[1.73_m2]    Calcium 9.2 8.5 - 10.1 mg/dL    Bilirubin Total 0.6 0.2 - 1.3 mg/dL    Albumin 4.1 3.4 - 5.0 g/dL    Protein Total 7.4 6.8 - 8.8 g/dL    Alkaline Phosphatase 75 40 - 150 U/L    ALT 42 0 - 70 U/L    AST 25 0 - 45 U/L   Troponin I   Result Value Ref Range    Troponin I ES <0.015 0.000 - 0.045 ug/L   Magnesium   Result Value Ref Range    Magnesium 1.9 1.6 - 2.3 mg/dL   TSH with free T4 reflex   Result Value Ref Range    TSH 0.44 0.40 - 4.00 mU/L   XR Chest 2 Views    Narrative    EXAMINATION: XR CHEST 2 VW on 8/20/2020 9:47 PM.    INDICATION: palpitations.    COMPARISON: MR dated 3/3/2020. CT dated 7/8/2019..    FINDINGS: PA and lateral upright views of the chest. Median sternotomy  wires. Right upper abdomen surgical clips.     Trachea is clear. Cardiac mediastinal silhouette is within normal  limits. Pulmonary vasculature is distinct. No pneumothorax or pleural  effusions. No focal airspace opacity. No acute osseous abnormality.  The upper abdomen is unremarkable.      Impression    IMPRESSION:  No acute findings in the chest.    I have personally reviewed the examination and initial interpretation  and I agree with the findings.    MUSHTAQ FLORES MD     *Note: Due to a large number of results and/or encounters for the requested time period, some results have not been displayed. A complete set of results can be found in Results Review.       Labs, vital signs, and imaging studies were reviewed by me.    Medications - No data to  display    Assessments & Plan (with Medical Decision Making)   Cricket Chen is a 39 year old male who presents the emergency department with chief complaint of palpitations.  Patient does have a history of transposition of the great vessels as well as atrial flutter.  EKG however shows normal sinus rhythm and is unchanged from prior EKG.  Labs ordered to rule out underlying metabolic/electrolyte abnormality.  Chest x-ray ordered as well.    Laboratory work-up is unremarkable other than mildly elevated creatinine, which appears to be at patient's baseline.  Troponin is within normal limits.    Chest x-ray shows no acute disease process.    I have reviewed the nursing notes.    I have reviewed the findings, diagnosis, plan and need for follow up with the patient.    Patient was discussed with cardiology, they recommend ZIO Patch placement for 14 days and follow-up with cardiology for further work-up.  No need for inpatient admission at this time.    Patient was discussed with EKG tech, they are unable to place Ziehl patch at this time of day, however, they would be able to do this on an outpatient basis tomorrow.  Patient was provided with contact information to set this up and order for outpatient 0 patch placement for 14 days was placed.    Patient to be discharged home. Advised to call for ZIO Patch placement tomorrow and follow up with cardiology as directed. To return to ER immediately with any new/worsening symptoms, particularly any chest pain or shortness of breath. Plan of care discussed with patient who expresses understanding and agrees with plan of care.      New Prescriptions    No medications on file       Final diagnoses:   Palpitations   Elevated serum creatinine       8/20/2020   [facility], [place], EMERGENCY DEPARTMENT     Catherine Hightower MD  08/20/20 0302

## 2020-08-21 NOTE — DISCHARGE INSTRUCTIONS
TODAY'S VISIT:  You were seen today for palpitations  -   - If you had any labs or imaging/radiology tests performed today, you should also discuss these tests with your usual provider.     FOLLOW-UP:  Please make an appointment to follow up with:  - Your Primary Care Provider. If you do not have a PCP, please call the Primary Care Center (phone: [phone] for an appointment  - Cardiology Clinic (phone: [phone]) - follow up about your Zio patch results and for possible ECHO  - Call [phone] tomorrow morning to set up an appointment to get a Zio patch placed ASAP    - Have your provider review the results from today's visit with you again to make sure no further follow-up or additional testing is needed based on those results.     RETURN TO THE EMERGENCY DEPARTMENT  Return to the Emergency Department at any time for any new or worsening symptoms or any concerns.

## 2020-08-24 LAB — INTERPRETATION ECG - MUSE: NORMAL

## 2020-09-01 DIAGNOSIS — Z13.220 LIPID SCREENING: ICD-10-CM

## 2020-09-01 DIAGNOSIS — D47.Z1 POST-TRANSPLANT LYMPHOPROLIFERATIVE DISORDER (H): ICD-10-CM

## 2020-09-01 DIAGNOSIS — Z94.4 LIVER REPLACED BY TRANSPLANT (H): ICD-10-CM

## 2020-09-01 LAB
ALBUMIN SERPL-MCNC: 4.1 G/DL (ref 3.4–5)
ALP SERPL-CCNC: 69 U/L (ref 40–150)
ALT SERPL W P-5'-P-CCNC: 37 U/L (ref 0–70)
ANION GAP SERPL CALCULATED.3IONS-SCNC: 7 MMOL/L (ref 3–14)
AST SERPL W P-5'-P-CCNC: 13 U/L (ref 0–45)
BILIRUB DIRECT SERPL-MCNC: 0.1 MG/DL (ref 0–0.2)
BILIRUB SERPL-MCNC: 0.5 MG/DL (ref 0.2–1.3)
BUN SERPL-MCNC: 16 MG/DL (ref 7–30)
CALCIUM SERPL-MCNC: 9.4 MG/DL (ref 8.5–10.1)
CHLORIDE SERPL-SCNC: 107 MMOL/L (ref 94–109)
CO2 SERPL-SCNC: 26 MMOL/L (ref 20–32)
CREAT SERPL-MCNC: 1.58 MG/DL (ref 0.66–1.25)
ERYTHROCYTE [DISTWIDTH] IN BLOOD BY AUTOMATED COUNT: 13.6 % (ref 10–15)
GFR SERPL CREATININE-BSD FRML MDRD: 54 ML/MIN/{1.73_M2}
GLUCOSE SERPL-MCNC: 94 MG/DL (ref 70–99)
HCT VFR BLD AUTO: 45.4 % (ref 40–53)
HGB BLD-MCNC: 15.3 G/DL (ref 13.3–17.7)
MAGNESIUM SERPL-MCNC: 2 MG/DL (ref 1.6–2.3)
MCH RBC QN AUTO: 29.8 PG (ref 26.5–33)
MCHC RBC AUTO-ENTMCNC: 33.7 G/DL (ref 31.5–36.5)
MCV RBC AUTO: 88 FL (ref 78–100)
PLATELET # BLD AUTO: 150 10E9/L (ref 150–450)
POTASSIUM SERPL-SCNC: 4.1 MMOL/L (ref 3.4–5.3)
PROT SERPL-MCNC: 7 G/DL (ref 6.8–8.8)
RBC # BLD AUTO: 5.14 10E12/L (ref 4.4–5.9)
SODIUM SERPL-SCNC: 140 MMOL/L (ref 133–144)
TACROLIMUS BLD-MCNC: 4.5 UG/L (ref 5–15)
TME LAST DOSE: ABNORMAL H
WBC # BLD AUTO: 6.1 10E9/L (ref 4–11)

## 2020-09-01 PROCEDURE — 85027 COMPLETE CBC AUTOMATED: CPT | Performed by: FAMILY MEDICINE

## 2020-09-01 PROCEDURE — 80053 COMPREHEN METABOLIC PANEL: CPT | Performed by: FAMILY MEDICINE

## 2020-09-01 PROCEDURE — 36415 COLL VENOUS BLD VENIPUNCTURE: CPT | Performed by: FAMILY MEDICINE

## 2020-09-01 PROCEDURE — 82248 BILIRUBIN DIRECT: CPT | Performed by: FAMILY MEDICINE

## 2020-09-01 PROCEDURE — 83735 ASSAY OF MAGNESIUM: CPT | Performed by: FAMILY MEDICINE

## 2020-09-01 PROCEDURE — 80197 ASSAY OF TACROLIMUS: CPT | Performed by: FAMILY MEDICINE

## 2020-09-01 PROCEDURE — 82784 ASSAY IGA/IGD/IGG/IGM EACH: CPT | Performed by: FAMILY MEDICINE

## 2020-09-02 LAB — IGG SERPL-MCNC: 739 MG/DL (ref 610–1616)

## 2020-09-25 DIAGNOSIS — I48.92 ATRIAL FLUTTER, UNSPECIFIED TYPE (H): ICD-10-CM

## 2020-10-01 RX ORDER — METOPROLOL SUCCINATE 25 MG/1
25 TABLET, EXTENDED RELEASE ORAL 2 TIMES DAILY
Qty: 180 TABLET | Refills: 3 | Status: SHIPPED | OUTPATIENT
Start: 2020-10-01 | End: 2020-10-02

## 2020-10-02 ENCOUNTER — TELEPHONE (OUTPATIENT)
Dept: CARDIOLOGY | Facility: CLINIC | Age: 40
End: 2020-10-02

## 2020-10-02 ENCOUNTER — HOSPITAL ENCOUNTER (EMERGENCY)
Facility: CLINIC | Age: 40
Discharge: HOME | End: 2020-10-02
Attending: EMERGENCY MEDICINE | Admitting: EMERGENCY MEDICINE
Payer: COMMERCIAL

## 2020-10-02 VITALS
SYSTOLIC BLOOD PRESSURE: 130 MMHG | BODY MASS INDEX: 30.85 KG/M2 | OXYGEN SATURATION: 97 % | DIASTOLIC BLOOD PRESSURE: 86 MMHG | TEMPERATURE: 97.8 F | RESPIRATION RATE: 9 BRPM | HEART RATE: 78 BPM | HEIGHT: 70 IN

## 2020-10-02 DIAGNOSIS — Z94.0 KIDNEY TRANSPLANT RECIPIENT: ICD-10-CM

## 2020-10-02 DIAGNOSIS — R00.2 PALPITATIONS: ICD-10-CM

## 2020-10-02 DIAGNOSIS — Z79.60 LONG-TERM USE OF IMMUNOSUPPRESSANT MEDICATION: ICD-10-CM

## 2020-10-02 DIAGNOSIS — I48.92 ATRIAL FLUTTER, UNSPECIFIED TYPE (H): ICD-10-CM

## 2020-10-02 LAB
ALBUMIN SERPL-MCNC: 4.1 G/DL (ref 3.4–5)
ALP SERPL-CCNC: 79 U/L (ref 40–150)
ALT SERPL W P-5'-P-CCNC: 61 U/L (ref 0–70)
ANION GAP SERPL CALCULATED.3IONS-SCNC: 4 MMOL/L (ref 3–14)
AST SERPL W P-5'-P-CCNC: 35 U/L (ref 0–45)
BASOPHILS # BLD AUTO: 0 10E9/L (ref 0–0.2)
BASOPHILS NFR BLD AUTO: 0.3 %
BILIRUB SERPL-MCNC: 0.3 MG/DL (ref 0.2–1.3)
BUN SERPL-MCNC: 14 MG/DL (ref 7–30)
CALCIUM SERPL-MCNC: 9 MG/DL (ref 8.5–10.1)
CHLORIDE SERPL-SCNC: 111 MMOL/L (ref 94–109)
CO2 SERPL-SCNC: 26 MMOL/L (ref 20–32)
CREAT SERPL-MCNC: 1.58 MG/DL (ref 0.66–1.25)
DIFFERENTIAL METHOD BLD: NORMAL
EOSINOPHIL # BLD AUTO: 0.1 10E9/L (ref 0–0.7)
EOSINOPHIL NFR BLD AUTO: 0.8 %
ERYTHROCYTE [DISTWIDTH] IN BLOOD BY AUTOMATED COUNT: 12.9 % (ref 10–15)
GFR SERPL CREATININE-BSD FRML MDRD: 54 ML/MIN/{1.73_M2}
GLUCOSE SERPL-MCNC: 104 MG/DL (ref 70–99)
HCT VFR BLD AUTO: 44.9 % (ref 40–53)
HGB BLD-MCNC: 15 G/DL (ref 13.3–17.7)
IMM GRANULOCYTES # BLD: 0 10E9/L (ref 0–0.4)
IMM GRANULOCYTES NFR BLD: 0.2 %
INTERPRETATION ECG - MUSE: NORMAL
LYMPHOCYTES # BLD AUTO: 1.5 10E9/L (ref 0.8–5.3)
LYMPHOCYTES NFR BLD AUTO: 25 %
MAGNESIUM SERPL-MCNC: 2.2 MG/DL (ref 1.6–2.3)
MCH RBC QN AUTO: 29.6 PG (ref 26.5–33)
MCHC RBC AUTO-ENTMCNC: 33.4 G/DL (ref 31.5–36.5)
MCV RBC AUTO: 89 FL (ref 78–100)
MONOCYTES # BLD AUTO: 0.6 10E9/L (ref 0–1.3)
MONOCYTES NFR BLD AUTO: 9.7 %
NEUTROPHILS # BLD AUTO: 3.9 10E9/L (ref 1.6–8.3)
NEUTROPHILS NFR BLD AUTO: 64 %
NRBC # BLD AUTO: 0 10*3/UL
NRBC BLD AUTO-RTO: 0 /100
PHOSPHATE SERPL-MCNC: 2.3 MG/DL (ref 2.5–4.5)
PLATELET # BLD AUTO: 152 10E9/L (ref 150–450)
POTASSIUM SERPL-SCNC: 3.8 MMOL/L (ref 3.4–5.3)
PROT SERPL-MCNC: 7.4 G/DL (ref 6.8–8.8)
RBC # BLD AUTO: 5.06 10E12/L (ref 4.4–5.9)
SODIUM SERPL-SCNC: 141 MMOL/L (ref 133–144)
TROPONIN I SERPL-MCNC: <0.015 UG/L (ref 0–0.04)
TSH SERPL DL<=0.005 MIU/L-ACNC: 1.67 MU/L (ref 0.4–4)
WBC # BLD AUTO: 6.1 10E9/L (ref 4–11)

## 2020-10-02 PROCEDURE — 80053 COMPREHEN METABOLIC PANEL: CPT | Performed by: EMERGENCY MEDICINE

## 2020-10-02 PROCEDURE — 83735 ASSAY OF MAGNESIUM: CPT | Performed by: EMERGENCY MEDICINE

## 2020-10-02 PROCEDURE — 93010 ELECTROCARDIOGRAM REPORT: CPT | Performed by: EMERGENCY MEDICINE

## 2020-10-02 PROCEDURE — 84443 ASSAY THYROID STIM HORMONE: CPT | Performed by: EMERGENCY MEDICINE

## 2020-10-02 PROCEDURE — 85025 COMPLETE CBC W/AUTO DIFF WBC: CPT | Performed by: EMERGENCY MEDICINE

## 2020-10-02 PROCEDURE — 84484 ASSAY OF TROPONIN QUANT: CPT | Performed by: EMERGENCY MEDICINE

## 2020-10-02 PROCEDURE — 250N000013 HC RX MED GY IP 250 OP 250 PS 637: Performed by: EMERGENCY MEDICINE

## 2020-10-02 PROCEDURE — 99284 EMERGENCY DEPT VISIT MOD MDM: CPT | Mod: 25 | Performed by: EMERGENCY MEDICINE

## 2020-10-02 PROCEDURE — 99284 EMERGENCY DEPT VISIT MOD MDM: CPT | Performed by: EMERGENCY MEDICINE

## 2020-10-02 PROCEDURE — 84100 ASSAY OF PHOSPHORUS: CPT | Performed by: EMERGENCY MEDICINE

## 2020-10-02 PROCEDURE — 93005 ELECTROCARDIOGRAM TRACING: CPT | Performed by: EMERGENCY MEDICINE

## 2020-10-02 RX ORDER — METOPROLOL TARTRATE 25 MG/1
25 TABLET, FILM COATED ORAL ONCE
Status: COMPLETED | OUTPATIENT
Start: 2020-10-02 | End: 2020-10-02

## 2020-10-02 RX ORDER — METOPROLOL SUCCINATE 25 MG/1
25 TABLET, EXTENDED RELEASE ORAL 2 TIMES DAILY
Qty: 180 TABLET | Refills: 3 | Status: SHIPPED | OUTPATIENT
Start: 2020-10-02 | End: 2021-09-22

## 2020-10-02 RX ADMIN — METOPROLOL TARTRATE 25 MG: 25 TABLET, FILM COATED ORAL at 01:58

## 2020-10-02 ASSESSMENT — ENCOUNTER SYMPTOMS
CHILLS: 0
LIGHT-HEADEDNESS: 0
COUGH: 0
VOMITING: 0
FEVER: 0
PALPITATIONS: 1
ABDOMINAL PAIN: 0
NAUSEA: 0
DIZZINESS: 0
SHORTNESS OF BREATH: 0
DIARRHEA: 0

## 2020-10-02 NOTE — ED TRIAGE NOTES
Patient started noticing this evening he had an irregular heart rate. Hasn't taken home medication metoprolol at all today. Previously been seen in hospital for irregular heartbeat before and started on metoprolol and lisinopril.

## 2020-10-02 NOTE — ED NOTES
Bed: IN08  Expected date: 10/2/20  Expected time:   Means of arrival:   Comments:  LIEN Chen 41yo M, [id number] Transposition of great vessels, h/o a-flutter/fib, off his metoprolol for a day or two, is tachycardic     Thuan Farley MD  10/02/20 0208

## 2020-10-02 NOTE — TELEPHONE ENCOUNTER
[facility]    Mr. Chen is a 39 yo with D-TGA s/P atrial baffle procedure with hx of atrial flutter S/P ablation 2000 and recurrance of aflutter 2018 requiring admission and discharge cardioversion.     S/P Liver and Kidney transplantation 2016.   FOllowed by Dr.s Alba and Ariel    Current medications are:  Eliquis  Metoprolol XL 25 bid  Lisinopril    + Transplant meds    Patient called due to fast heart rate this evening making him anxious , unable to sleep. Has missed \"several\" > 2 doses of metoprolol. Unknown heart rate, no LOC, not dizzy.     Systemic EF  27% on MRI March 2020   Recent zio patch monitor Aug 2020 with runs of nonsustained VT and SVT.     Last ECG march sinus rhythm.     Pt at risk for afib/VT/SUdden death. Recommended ER evaluation/ EP consult if sustained arrhythmia    Pt agreed to go to [facility] ER.     ER notifed of pt referral    Rah Torres M.D.   [phone]   of Pediatrics  Pediatric and Adult Congenital Cardiology  [facility]  Pediatric Cardiology Office [phone]  Adult Congenital Cardiology Triage and Scheduling [phone]        "

## 2020-10-02 NOTE — ED PROVIDER NOTES
ED Provider Note  [facility]      History     Chief Complaint   Patient presents with     Palpitations     The history is provided by the patient and medical records.     Cricket Chen is a 40 year old male with a medical history significant for transposition of great vessels, alcoholic hepatitis s/p liver and kidney transplant (5/2016), atrial flutter s/p cardioversion, hypertension, chronic pain and thyroid cancer who presents to the Emergency Department for evaluation of palpitations.  The patient reports he was sitting watching TV this evening at approximately 12:30 AM (1 hour prior to arrival) when he suddenly began feeling his heart was racing.  He did not check his pulse.  He reports he has otherwise been feeling at his baseline of health.  He denies any dizziness, lightheadedness, chest pain, shortness of breath, fevers, chills, cough, nausea, vomiting, diarrhea or abdominal pain.  He denies any alcohol use or drug use.  The patient reports that he did drink coffee yesterday morning (approxiamtely 17 hours ago), but he drank the amount that he normally does in the mornings.  Patient does report that he ran out of his metoprolol yesterday, so he missed yesterday's morning dose and this evening's dose.  The patient reports that his metoprolol dose was increased last time he was here in the Emergency Department.  Per review of patient's chart, the dose was increased on 8/21/2020 by his cardiologist, it was increased from 25 mg daily to 25 mg twice daily.    Per review of patient's chart, patient has been seen in the Emergency Department here in the past for palpitations and irregular heartbeats.  Most recently, patient was seen on 8/20/2020 here in the Emergency Department.  At that time, EKG showed normal sinus rhythm and was unchanged from prior EKG.  Cardiology was consulted and they recommended ZIO Patch placement for 14 days and to follow-up with cardiology.    Past Medical  History  Past Medical History:   Diagnosis Date     Alcohol abuse     Last drink in Mid-April 2014     Anemia in ESRD (end-stage renal disease) (H)      Anxiety 2008     Atrial flutter (H) 2017     Cirrhosis (H)     S/P liver transplant     Depression      History of blood transfusion      History of transposition of great vessels     atrial switch at age 8 months old     Hypertension      Liver transplant recipient (H)     2016     Papillary thyroid carcinoma (H)      Pneumonia 11-15-14     Renal transplant recipient     2016     Varices, esophageal (H)      Past Surgical History:   Procedure Laterality Date     ANESTHESIA CARDIOVERSION N/A 3/7/2018    Procedure: ANESTHESIA CARDIOVERSION;  Cardioversion;  Surgeon: GENERIC ANESTHESIA PROVIDER;  Location:  OR     BENCH LIVER N/A 5/10/2016    Procedure: BENCH LIVER;  Surgeon: Ricky Deshpande MD;  Location:  OR     BIOPSY LYMPH NODE CERVICAL Right 1/26/2017    Procedure: BIOPSY LYMPH NODE CERVICAL;  Surgeon: Beka Soto MD;  Location:  OR     CARDIAC SURGERY  9-10-80     CREATE FISTULA ARTERIOVENOUS UPPER EXTREMITY Right 9/16/2014    Procedure: CREATE FISTULA ARTERIOVENOUS UPPER EXTREMITY;  Surgeon: Padmaja Eaton MD;  Location:  OR     CV RIGHT HEART CATH N/A 4/19/2019    Procedure: CV RIGHT HEART CATH;  Surgeon: Sabi Wiggins MD;  Location:  HEART CARDIAC CATH LAB     CYSTOSCOPY, REMOVE STENT(S), COMBINED Right 6/22/2016    Procedure: COMBINED CYSTOSCOPY, REMOVE STENT(S);  Surgeon: Ricky Deshpande MD;  Location:  OR     EMBOLECTOMY UPPER EXTREMITY Right 8/17/2018    Procedure: EMBOLECTOMY UPPER EXTREMITY;  Repair Right Upper Arm Pseudo Aneursym ;  Surgeon: John Payton MD;  Location:  OR     ENT SURGERY       ESOPHAGOSCOPY, GASTROSCOPY, DUODENOSCOPY (EGD), COMBINED  5/30/2014    Procedure: COMBINED ESOPHAGOSCOPY, GASTROSCOPY, DUODENOSCOPY (EGD);  Surgeon: Guillaume Bautista MD;  Location:  GI     ESOPHAGOSCOPY,  GASTROSCOPY, DUODENOSCOPY (EGD), COMBINED  14     ESOPHAGOSCOPY, GASTROSCOPY, DUODENOSCOPY (EGD), COMBINED Left 3/12/2015    Procedure: COMBINED ESOPHAGOSCOPY, GASTROSCOPY, DUODENOSCOPY (EGD);  Surgeon: Laureen Galvan MD;  [facility]     ESOPHAGOSCOPY, GASTROSCOPY, DUODENOSCOPY (EGD), COMBINED N/A 2016    Procedure: COMBINED ESOPHAGOSCOPY, GASTROSCOPY, DUODENOSCOPY (EGD);  Surgeon: Laureen Galvan MD;  [facility]     ESOPHAGOSCOPY, GASTROSCOPY, DUODENOSCOPY (EGD), COMBINED N/A 2016    Procedure: COMBINED ESOPHAGOSCOPY, GASTROSCOPY, DUODENOSCOPY (EGD);  Surgeon: Laureen Galvan MD;  Location: [facility] GI     HC OR CATH ABLATION NON-CARDIAC ENDOVASCULAR      SVT     INSERT PORT VASCULAR ACCESS N/A 4/3/2017    Procedure: INSERT PORT VASCULAR ACCESS;  Surgeon: Rajendra Jacques PA-C;  [facility] OR     IR PORT REMOVAL LEFT  2019     LIGATE FISTULA ARTERIOVENOUS UPPER EXTREMITY Right 2017    Procedure: LIGATE FISTULA ARTERIOVENOUS UPPER EXTREMITY;  Revision of Right Arm Arteriovenous Fistula ;  Surgeon: Padmaja Eaton MD;  [facility]     PERCUTANEOUS BIOPSY KIDNEY Right 2018    Procedure: PERCUTANEOUS BIOPSY KIDNEY;  Right Kidney Biopsy;  Surgeon: Yuval Khan MD;  [facility]     PERCUTANEOUS BIOPSY KIDNEY Right 10/30/2019    Procedure: Right Side Kidney Biopsy;  Surgeon: Jake Sidhu MD;  [facility]     PICC INSERTION  14    PICC line placement 14; Removal 2014     REMOVE PORT VASCULAR ACCESS Right 2019    Procedure: Right chest Port Removal;  Surgeon: Erasmo Ziegler PA-C;  [facility]     THORACIC SURGERY  1980    Transposition great arteries, repaired at 8 months     THYROIDECTOMY Right 2017    Procedure: THYROIDECTOMY;  Bilateral Total Thyroidectomy ;  Surgeon: Beka Soto MD;  [facility]     TRANSPLANT KIDNEY RECIPIENT  DONOR  5/10/2016     Procedure: TRANSPLANT KIDNEY RECIPIENT  DONOR;  Surgeon: Ricky Deshpande MD;  [facility]     TRANSPLANT LIVER RECIPIENT  DONOR N/A 5/10/2016    Procedure: TRANSPLANT LIVER RECIPIENT  DONOR;  Surgeon: Ricky Deshpande MD;  [facility]          apixaban ANTICOAGULANT (ELIQUIS ANTICOAGULANT) 5 MG tablet       busPIRone (BUSPAR) 15 MG tablet       hydrOXYzine (ATARAX) 50 MG tablet       ketoconazole (NIZORAL) 2 % cream       levothyroxine (SYNTHROID/LEVOTHROID) 137 MCG tablet       lisinopril (ZESTRIL) 10 MG tablet       magnesium oxide (MAG-OX) 400 MG tablet       metoprolol succinate ER (TOPROL-XL) 25 MG 24 hr tablet       mycophenolate (GENERIC EQUIVALENT) 250 MG capsule       sulfamethoxazole-trimethoprim (BACTRIM/SEPTRA) 400-80 MG tablet       tacrolimus (GENERIC EQUIVALENT) 0.5 MG capsule       tacrolimus (GENERIC EQUIVALENT) 1 MG capsule      Allergies   Allergen Reactions     Hydromorphone Itching     Family History  Family History   Problem Relation Age of Onset     No Known Problems Brother      Arthritis Father      Hypertension Father      Hypertension Mother      Anxiety Disorder Mother      Hypertension Sister      No Known Problems Maternal Grandmother      No Known Problems Maternal Grandfather      No Known Problems Paternal Grandmother      No Known Problems Sister      No Known Problems Paternal Grandfather      Alcohol/Drug No family hx of      Gastrointestinal Disease No family hx of         no fam hx of liver disease or liver cancer     Social History   Social History     Tobacco Use     Smoking status: Former Smoker     Packs/day: 0.33     Years: 3.00     Pack years: 0.99     Types: Cigarettes     Start date: 1997     Quit date: 2000     Years since quittin.0     Smokeless tobacco: Former User     Tobacco comment: Quit chewing in early    Substance Use Topics     Alcohol use: No     Alcohol/week: 0.0 standard drinks     Comment: ~10  "drinks per day for ten years, quit in April of 2014     Drug use: No      Past medical history, past surgical history, medications, allergies, family history, and social history were reviewed with the patient. No additional pertinent items.       Review of Systems   Constitutional: Negative for chills and fever.   Respiratory: Negative for cough and shortness of breath.    Cardiovascular: Positive for palpitations. Negative for chest pain.   Gastrointestinal: Negative for abdominal pain, diarrhea, nausea and vomiting.   Neurological: Negative for dizziness and light-headedness.   All other systems reviewed and are negative.      Physical Exam   BP: 137/87  Pulse: 89  Temp: 97.1  F (36.2  C)  Resp: 16  Height: 177.8 cm (5' 10\")  SpO2: 97 %  Physical Exam  Physical Exam   Constitutional: oriented to person, place, and time. appears well-developed and well-nourished.   HENT:   Head: Normocephalic and atraumatic.   Neck: Normal range of motion.   Pulmonary/Chest: Effort normal. No respiratory distress. No wheezing.  Cardiac: No murmurs, rubs, gallops. RRR.  Abdominal: Abdomen soft, nontender, nondistended. No rebound tenderness.  MSK: Long bones without deformity or evidence of trauma.  No lower extremity swelling.  Neurological: alert and oriented to person, place, and time.   Skin: Skin is warm and dry.   Psychiatric:  normal mood and affect.  behavior is normal. Thought content normal.     ED Course      Procedures     1:36 AM  The patient was seen and examined by Claude Rao MD in Room ED09.                EKG Interpretation:      Interpreted by Claude Rao MD  Time reviewed: 0150  Symptoms at time of EKG: palpitations   Rhythm: normal sinus   Rate: Normal  Axis: Right Axis Deviation  Ectopy: none  Conduction: right bundle branch block (complete)  ST Segments/ T Waves: No acute ischemic changes  Q Waves: nonspecific  Comparison to prior: Unchanged    Clinical Impression: Findings with right bundle branch " block stable from prior.  No significant changes from prior EKGs.      Results for orders placed or performed during the hospital encounter of 10/02/20   POC US Echo Limited     Status: None    Impression    Limited Bedside Cardiac Ultrasound, performed and interpreted by me.   Indication: palpitatoins.  Parasternal long axis, parasternal short axis and apical 4 chamber views were acquired.   Image quality was satisfactory.    Findings:    Global left ventricular function appears normal  Chambers do not appear dilated.  There is no evidence of free fluid within the pericardium.    IMPRESSION: Grossly normal left ventricular function and chamber size.  No pericardial effusion..     CBC with platelets differential     Status: None   Result Value Ref Range    WBC 6.1 4.0 - 11.0 10e9/L    RBC Count 5.06 4.4 - 5.9 10e12/L    Hemoglobin 15.0 13.3 - 17.7 g/dL    Hematocrit 44.9 40.0 - 53.0 %    MCV 89 78 - 100 fl    MCH 29.6 26.5 - 33.0 pg    MCHC 33.4 31.5 - 36.5 g/dL    RDW 12.9 10.0 - 15.0 %    Platelet Count 152 150 - 450 10e9/L    Diff Method Automated Method     % Neutrophils 64.0 %    % Lymphocytes 25.0 %    % Monocytes 9.7 %    % Eosinophils 0.8 %    % Basophils 0.3 %    % Immature Granulocytes 0.2 %    Nucleated RBCs 0 0 /100    Absolute Neutrophil 3.9 1.6 - 8.3 10e9/L    Absolute Lymphocytes 1.5 0.8 - 5.3 10e9/L    Absolute Monocytes 0.6 0.0 - 1.3 10e9/L    Absolute Eosinophils 0.1 0.0 - 0.7 10e9/L    Absolute Basophils 0.0 0.0 - 0.2 10e9/L    Abs Immature Granulocytes 0.0 0 - 0.4 10e9/L    Absolute Nucleated RBC 0.0    Comprehensive metabolic panel     Status: Abnormal   Result Value Ref Range    Sodium 141 133 - 144 mmol/L    Potassium 3.8 3.4 - 5.3 mmol/L    Chloride 111 (H) 94 - 109 mmol/L    Carbon Dioxide 26 20 - 32 mmol/L    Anion Gap 4 3 - 14 mmol/L    Glucose 104 (H) 70 - 99 mg/dL    Urea Nitrogen 14 7 - 30 mg/dL    Creatinine 1.58 (H) 0.66 - 1.25 mg/dL    GFR Estimate 54 (L) >60 mL/min/[1.73_m2]    GFR  Estimate If Black 62 >60 mL/min/[1.73_m2]    Calcium 9.0 8.5 - 10.1 mg/dL    Bilirubin Total 0.3 0.2 - 1.3 mg/dL    Albumin 4.1 3.4 - 5.0 g/dL    Protein Total 7.4 6.8 - 8.8 g/dL    Alkaline Phosphatase 79 40 - 150 U/L    ALT 61 0 - 70 U/L    AST 35 0 - 45 U/L   Troponin I     Status: None   Result Value Ref Range    Troponin I ES <0.015 0.000 - 0.045 ug/L   TSH with free T4 reflex     Status: None   Result Value Ref Range    TSH 1.67 0.40 - 4.00 mU/L   Magnesium     Status: None   Result Value Ref Range    Magnesium 2.2 1.6 - 2.3 mg/dL   Phosphorus     Status: Abnormal   Result Value Ref Range    Phosphorus 2.3 (L) 2.5 - 4.5 mg/dL          No results found for any visits on 10/02/20.  Medications - No data to display     Assessments & Plan (with Medical Decision Making)   MDM  Patient presented with palpitations.  Vital signs are stable, no tachycardia, sinus rhythm on EKG and no acute changes from prior EKGs with stable right bundle branch block.  The patient is not a symptoms of ischemia.  He is asymptomatic essentially right now.  He has no shortness of breath or chest pain.  He has been taking his blood thinners a very unlikely pulmonary embolism.  Labs here show a mildly decreased potassium, do not feel this is because of symptoms.  Likely because he did not take his metoprolol for several days.  He was given a dose here and monitored.  There is no abnormal rhythm on the monitor.  He was given a written prescription in case his prescription does not show up in the mail tomorrow.  I did discuss briefly with the pediatric cardiologist whom he sees, she agrees with the plan I recommended that he call the clinic tomorrow to make the appointment.  The patient has a phone number and contact info, he will call tomorrow or return if worsening.    I have reviewed the nursing notes. I have reviewed the findings, diagnosis, plan and need for follow up with the patient.    New Prescriptions    No medications on file        Final diagnoses:   Palpitations       --  I, Jj Rizvi, am serving as a trained medical scribe to document services personally performed by Claude Rao MD, based on the provider's statements to me.     IClaude MD, was physically present and have reviewed and verified the accuracy of this note documented by Jj Rizvi.    Claude Rao MD  [facility] EMERGENCY DEPARTMENT  10/2/2020     Claude Rao MD  10/02/20 0245

## 2020-10-02 NOTE — ED AVS SNAPSHOT
[facility] Emergency Department  [address] [place] [address]  Phone: [phone]                                    Cricket Chen   MRN: [id number]    Department: [facility] Emergency Department   Date of Visit: 10/2/2020           After Visit Summary Signature Page    I have received my discharge instructions, and my questions have been answered. I have discussed any challenges I see with this plan with the nurse or doctor.    ..........................................................................................................................................  Patient/Patient Representative Signature      ..........................................................................................................................................  Patient Representative Print Name and Relationship to Patient    ..................................................               ................................................  Date                                   Time    ..........................................................................................................................................  Reviewed by Signature/Title    ...................................................              ..............................................  Date                                               Time          22EPIC Rev 08/18

## 2020-10-02 NOTE — DISCHARGE INSTRUCTIONS
I had a discussion with the cardiology team.  They would like to see you in clinic, please call them for an appointment.    If you have any further palpitations, chest pain, shortness of breath, dizziness or any other concerns please return to the emergency department.    I gave you a paper prescription for your metoprolol, if you do not receive this in the mail it will be  a good idea to fill this.    Continue take your medications as prescribed.

## 2020-10-05 NOTE — PROGRESS NOTES
Subjective     Cricket Chen is a 40 year old male who presents to clinic today for the following health issues:    HPI         Anxiety    How are you doing with your depression since your last visit? Worsened has been having panic attacks    How are you doing with your anxiety since your last visit?  Worsened     Are you having other symptoms that might be associated with depression or anxiety? No    Have you had a significant life event? No     Do you have any concerns with your use of alcohol or other drugs? No  Has been on Buspar and hydroxyzine. Denies lightheadedness or dizziness with medications. Denies alcohol or recreational drug use.     Social History     Tobacco Use     Smoking status: Former Smoker     Packs/day: 0.33     Years: 3.00     Pack years: 0.99     Types: Cigarettes     Start date: 1997     Quit date: 2000     Years since quittin.0     Smokeless tobacco: Former User     Tobacco comment: Quit chewing in early    Substance Use Topics     Alcohol use: No     Alcohol/week: 0.0 standard drinks     Comment: ~10 drinks per day for ten years, quit in 2014     Drug use: No     PHQ 2020 2020 10/7/2020   PHQ-9 Total Score - 3 6   Q9: Thoughts of better off dead/self-harm past 2 weeks Not at all Not at all Not at all     DEBBIE-7 SCORE 2020 2020 10/7/2020   Total Score 4 (minimal anxiety) - -   Total Score 4 3 5     Last PHQ-9 10/7/2020   1.  Little interest or pleasure in doing things 1   2.  Feeling down, depressed, or hopeless 1   3.  Trouble falling or staying asleep, or sleeping too much 2   4.  Feeling tired or having little energy 1   5.  Poor appetite or overeating 1   6.  Feeling bad about yourself 0   7.  Trouble concentrating 0   8.  Moving slowly or restless 0   Q9: Thoughts of better off dead/self-harm past 2 weeks 0   PHQ-9 Total Score 6   Difficulty at work, home, or with people Somewhat difficult     DEBBIE-7  10/7/2020   1. Feeling nervous,  anxious, or on edge 1   2. Not being able to stop or control worrying 1   3. Worrying too much about different things 0   4. Trouble relaxing 1   5. Being so restless that it is hard to sit still 1   6. Becoming easily annoyed or irritable 1   7. Feeling afraid, as if something awful might happen 0   DEBBIE-7 Total Score 5   If you checked any problems, how difficult have they made it for you to do your work, take care of things at home, or get along with other people? Not difficult at all       Suicide Assessment Five-step Evaluation and Treatment (SAFE-T)      How many servings of fruits and vegetables do you eat daily?  0-1    On average, how many sweetened beverages do you drink each day (Examples: soda, juice, sweet tea, etc.  Do NOT count diet or artificially sweetened beverages)?   0    How many days per week do you exercise enough to make your heart beat faster? 4    How many minutes a day do you exercise enough to make your heart beat faster? 30 - 60    How many days per week do you miss taking your medication? 0    Review of Systems   Constitutional, HEENT, cardiovascular, pulmonary, gi and gu systems are negative, except as otherwise noted.      Objective    /76   Pulse 66   Temp 98.1  F (36.7  C) (Oral)   Resp 15   Wt 88.2 kg (194 lb 8 oz)   SpO2 98%   BMI 27.91 kg/m    Body mass index is 27.91 kg/m .  Physical Exam   GENERAL: healthy, alert and no distress  EYES: Eyes grossly normal to inspection, PERRL and conjunctivae and sclerae normal  RESP: lungs clear to auscultation - no rales, rhonchi or wheezes  CV: regular rate and rhythm, normal S1 S2, no S3 or S4, no murmur, click or rub, no peripheral edema and peripheral pulses strong  PSYCH: mentation appears normal, affect normal/bright          Assessment & Plan     Anxiety  Patient denies lightheadedness or dizziness. States that he has not had dizziness related to medications in the past.   - busPIRone (BUSPAR) 15 MG tablet; Take one tablet  "(15 mg) twice daily for anxiety.  - busPIRone (BUSPAR) 5 MG tablet; Take one tablet 5 mg by mouth (take with one 15 mg tablet for 20 mg total) in the morning.  - hydrOXYzine (ATARAX) 50 MG tablet; Take 1 tablet (50 mg) by mouth at bedtime as needed, may repeat once for anxiety  - MENTAL HEALTH REFERRAL  - Adult; Psychiatry; Psychiatry; G: [facility]/Bridge to Long-Term Psychiatry as indicated ([phone]); Yes; Several Medications tried and failed; Yes; We will contact you to schedule the a...    Panic attack  - busPIRone (BUSPAR) 5 MG tablet; Take one tablet 5 mg by mouth (take with one 15 mg tablet for 20 mg total) in the morning.  - MENTAL HEALTH REFERRAL  - Adult; Psychiatry; Psychiatry; G: [facility] Psychiatry Service/Bridge to Long-Term Psychiatry as indicated ([phone]); Yes; Several Medications tried and failed; Yes; We will contact you to schedule the a...     BMI:   Estimated body mass index is 27.91 kg/m  as calculated from the following:    Height as of 10/2/20: 1.778 m (5' 10\").    Weight as of this encounter: 88.2 kg (194 lb 8 oz).   Weight management plan: Discussed healthy diet and exercise guidelines         Return in about 4 weeks (around 11/4/2020) for Follow up clinic visit (phone/video).    DARYN Guerra [facility]      "

## 2020-10-06 ENCOUNTER — RESULTS ONLY (OUTPATIENT)
Dept: OTHER | Facility: CLINIC | Age: 40
End: 2020-10-06

## 2020-10-06 DIAGNOSIS — Z94.4 LIVER REPLACED BY TRANSPLANT (H): ICD-10-CM

## 2020-10-06 DIAGNOSIS — Z13.220 LIPID SCREENING: ICD-10-CM

## 2020-10-06 DIAGNOSIS — Z94.0 KIDNEY TRANSPLANTED: ICD-10-CM

## 2020-10-06 LAB
ALBUMIN SERPL-MCNC: 4.2 G/DL (ref 3.4–5)
ALP SERPL-CCNC: 80 U/L (ref 40–150)
ALT SERPL W P-5'-P-CCNC: 38 U/L (ref 0–70)
ANION GAP SERPL CALCULATED.3IONS-SCNC: 6 MMOL/L (ref 3–14)
AST SERPL W P-5'-P-CCNC: 17 U/L (ref 0–45)
BILIRUB DIRECT SERPL-MCNC: 0.2 MG/DL (ref 0–0.2)
BILIRUB SERPL-MCNC: 1.1 MG/DL (ref 0.2–1.3)
BUN SERPL-MCNC: 15 MG/DL (ref 7–30)
CALCIUM SERPL-MCNC: 9 MG/DL (ref 8.5–10.1)
CHLORIDE SERPL-SCNC: 105 MMOL/L (ref 94–109)
CO2 SERPL-SCNC: 26 MMOL/L (ref 20–32)
CREAT SERPL-MCNC: 1.44 MG/DL (ref 0.66–1.25)
ERYTHROCYTE [DISTWIDTH] IN BLOOD BY AUTOMATED COUNT: 13.4 % (ref 10–15)
GFR SERPL CREATININE-BSD FRML MDRD: 60 ML/MIN/{1.73_M2}
GLUCOSE SERPL-MCNC: 87 MG/DL (ref 70–99)
HCT VFR BLD AUTO: 46.3 % (ref 40–53)
HGB BLD-MCNC: 15.8 G/DL (ref 13.3–17.7)
MAGNESIUM SERPL-MCNC: 2.2 MG/DL (ref 1.6–2.3)
MCH RBC QN AUTO: 29.8 PG (ref 26.5–33)
MCHC RBC AUTO-ENTMCNC: 34.1 G/DL (ref 31.5–36.5)
MCV RBC AUTO: 87 FL (ref 78–100)
PLATELET # BLD AUTO: 157 10E9/L (ref 150–450)
POTASSIUM SERPL-SCNC: 4.2 MMOL/L (ref 3.4–5.3)
PROT SERPL-MCNC: 7.3 G/DL (ref 6.8–8.8)
RBC # BLD AUTO: 5.31 10E12/L (ref 4.4–5.9)
SODIUM SERPL-SCNC: 137 MMOL/L (ref 133–144)
TACROLIMUS BLD-MCNC: 4.5 UG/L (ref 5–15)
TME LAST DOSE: ABNORMAL H
WBC # BLD AUTO: 6.2 10E9/L (ref 4–11)

## 2020-10-06 PROCEDURE — 36415 COLL VENOUS BLD VENIPUNCTURE: CPT | Performed by: INTERNAL MEDICINE

## 2020-10-06 PROCEDURE — 80076 HEPATIC FUNCTION PANEL: CPT | Performed by: INTERNAL MEDICINE

## 2020-10-06 PROCEDURE — 83735 ASSAY OF MAGNESIUM: CPT | Performed by: INTERNAL MEDICINE

## 2020-10-06 PROCEDURE — 86832 HLA CLASS I HIGH DEFIN QUAL: CPT | Performed by: INTERNAL MEDICINE

## 2020-10-06 PROCEDURE — 85027 COMPLETE CBC AUTOMATED: CPT | Performed by: INTERNAL MEDICINE

## 2020-10-06 PROCEDURE — 80048 BASIC METABOLIC PNL TOTAL CA: CPT | Performed by: INTERNAL MEDICINE

## 2020-10-06 PROCEDURE — 86833 HLA CLASS II HIGH DEFIN QUAL: CPT | Performed by: INTERNAL MEDICINE

## 2020-10-06 PROCEDURE — 80197 ASSAY OF TACROLIMUS: CPT | Performed by: INTERNAL MEDICINE

## 2020-10-06 NOTE — ADDENDUM NOTE
Addended by: DUSTIN TAYLOR on: 10/6/2020 11:38 AM     Modules accepted: Orders    
lump to labia following biopsy. no evidence of local infection.  appears to be non healing cyst. no acute intervention needed. pain control. fu with surgeon in 2 days

## 2020-10-07 ENCOUNTER — OFFICE VISIT (OUTPATIENT)
Dept: FAMILY MEDICINE | Facility: CLINIC | Age: 40
End: 2020-10-07
Payer: COMMERCIAL

## 2020-10-07 VITALS
SYSTOLIC BLOOD PRESSURE: 118 MMHG | OXYGEN SATURATION: 98 % | RESPIRATION RATE: 15 BRPM | TEMPERATURE: 98.1 F | WEIGHT: 194.5 LBS | HEART RATE: 66 BPM | DIASTOLIC BLOOD PRESSURE: 76 MMHG | BODY MASS INDEX: 27.91 KG/M2

## 2020-10-07 DIAGNOSIS — F41.9 ANXIETY: Primary | ICD-10-CM

## 2020-10-07 DIAGNOSIS — F41.0 PANIC ATTACK: ICD-10-CM

## 2020-10-07 PROCEDURE — 99213 OFFICE O/P EST LOW 20 MIN: CPT | Performed by: NURSE PRACTITIONER

## 2020-10-07 RX ORDER — BUSPIRONE HYDROCHLORIDE 15 MG/1
TABLET ORAL
Qty: 180 TABLET | Refills: 1 | Status: SHIPPED | OUTPATIENT
Start: 2020-10-07 | End: 2020-11-01

## 2020-10-07 RX ORDER — HYDROXYZINE HYDROCHLORIDE 50 MG/1
50 TABLET, FILM COATED ORAL
Qty: 90 TABLET | Refills: 0 | Status: SHIPPED | OUTPATIENT
Start: 2020-10-07 | End: 2020-10-16

## 2020-10-07 RX ORDER — BUSPIRONE HYDROCHLORIDE 5 MG/1
TABLET ORAL
Qty: 30 TABLET | Refills: 3 | Status: SHIPPED | OUTPATIENT
Start: 2020-10-07 | End: 2020-11-01

## 2020-10-07 ASSESSMENT — ANXIETY QUESTIONNAIRES
3. WORRYING TOO MUCH ABOUT DIFFERENT THINGS: NOT AT ALL
5. BEING SO RESTLESS THAT IT IS HARD TO SIT STILL: SEVERAL DAYS
7. FEELING AFRAID AS IF SOMETHING AWFUL MIGHT HAPPEN: NOT AT ALL
GAD7 TOTAL SCORE: 5
IF YOU CHECKED OFF ANY PROBLEMS ON THIS QUESTIONNAIRE, HOW DIFFICULT HAVE THESE PROBLEMS MADE IT FOR YOU TO DO YOUR WORK, TAKE CARE OF THINGS AT HOME, OR GET ALONG WITH OTHER PEOPLE: NOT DIFFICULT AT ALL
1. FEELING NERVOUS, ANXIOUS, OR ON EDGE: SEVERAL DAYS
6. BECOMING EASILY ANNOYED OR IRRITABLE: SEVERAL DAYS
2. NOT BEING ABLE TO STOP OR CONTROL WORRYING: SEVERAL DAYS

## 2020-10-07 ASSESSMENT — PATIENT HEALTH QUESTIONNAIRE - PHQ9
SUM OF ALL RESPONSES TO PHQ QUESTIONS 1-9: 6
5. POOR APPETITE OR OVEREATING: SEVERAL DAYS

## 2020-10-08 ASSESSMENT — ANXIETY QUESTIONNAIRES: GAD7 TOTAL SCORE: 5

## 2020-10-12 DIAGNOSIS — F41.9 ANXIETY: Primary | ICD-10-CM

## 2020-10-12 DIAGNOSIS — F41.0 PANIC ATTACK: ICD-10-CM

## 2020-10-14 ASSESSMENT — ANXIETY QUESTIONNAIRES
3. WORRYING TOO MUCH ABOUT DIFFERENT THINGS: NOT AT ALL
2. NOT BEING ABLE TO STOP OR CONTROL WORRYING: NOT AT ALL
6. BECOMING EASILY ANNOYED OR IRRITABLE: MORE THAN HALF THE DAYS
7. FEELING AFRAID AS IF SOMETHING AWFUL MIGHT HAPPEN: SEVERAL DAYS
GAD7 TOTAL SCORE: 5
7. FEELING AFRAID AS IF SOMETHING AWFUL MIGHT HAPPEN: SEVERAL DAYS
GAD7 TOTAL SCORE: 5
GAD7 TOTAL SCORE: 5
5. BEING SO RESTLESS THAT IT IS HARD TO SIT STILL: SEVERAL DAYS
1. FEELING NERVOUS, ANXIOUS, OR ON EDGE: NOT AT ALL
4. TROUBLE RELAXING: SEVERAL DAYS

## 2020-10-14 ASSESSMENT — PATIENT HEALTH QUESTIONNAIRE - PHQ9
10. IF YOU CHECKED OFF ANY PROBLEMS, HOW DIFFICULT HAVE THESE PROBLEMS MADE IT FOR YOU TO DO YOUR WORK, TAKE CARE OF THINGS AT HOME, OR GET ALONG WITH OTHER PEOPLE: NOT DIFFICULT AT ALL
SUM OF ALL RESPONSES TO PHQ QUESTIONS 1-9: 4
SUM OF ALL RESPONSES TO PHQ QUESTIONS 1-9: 4

## 2020-10-14 NOTE — PROGRESS NOTES
"Cricket Chen is a 40 year old male who is being evaluated via a billable telephone visit.      The patient has been notified of following:     \"This telephone visit will be conducted via a call between you and your physician/provider. We have found that certain health care needs can be provided without the need for a physical exam.  This service lets us provide the care you need with a short phone conversation.  If a prescription is necessary we can send it directly to your pharmacy.  If lab work is needed we can place an order for that and you can then stop by our lab to have the test done at a later time.    Telephone visits are billed at different rates depending on your insurance coverage. During this emergency period, for some insurers they may be billed the same as an in-person visit.  Please reach out to your insurance provider with any questions.    If during the course of the call the physician/provider feels a telephone visit is not appropriate, you will not be charged for this service.\"    Patient has given verbal consent for Telephone visit?  Yes    What phone number would you like to be contacted at? See Epic    How would you like to obtain your AVS? [facility]                                                             Outpatient Psychiatric Evaluation- Standard  Adult    Name:  Cricket Chen  : 1980    Source of Referral:  Primary Care Provider: Physician No Ref-Primary   Current Psychotherapist: None     Identifying Data:  Patient is a 40 year old, single  White American male  who presents for initial visit with me.  Patient is currently employed full time. Patient attended the phone/viseo session alone.     Chief Complaint:  Patient presents with:  Consult      HPI:  Cricket Chen is a 40 year old male with past history including anxiety and panic attacks who presents today for psychiatric evaluation. Of note he has a significant medical history inculding transposition of great vessels, " "alcoholic hepatitis s/p liver and kidney transplant (5/2016), atrial flutter s/p cardioversion, hypertension, chronic pain and thyroid cancer.     Patient had heart surgery when baby. Since he grew up all his life with a heart condition he typically knows when something feels off.  There have been many times when he has gone to the emergency room and they have found something irregular with his heart.  He has struggled with anxiety all his life and sometimes it is difficult to tell if he is experiencing something related to his medical condition that is causing anxiety or if he is only suffering from anxiety and no medical condition exacerbation.  He states 2 years ago was the last time they found something medically wrong in the emergency room.  And at that time he had let it go for an entire day because he thought it was just anxiety.  He went in the day after due to continuing to have symptoms and was found to be in a flutter and needed cardioversion.  His cardiologist recommends he goes into the emergency room when he feels something because it \"could be something.\"    He has had a couple recent emergency room visits and he reports the ED nurse mentioned he try to get an as needed med prescribed for his anxiety.  Patient's primary care provider referred the patient to psychiatry.  He reports his mood has overall been okay apart from the anxiety putting a damper on it.  Appetite fine. Usually only eats a couple meals a day. Used to overeat a lot but does not anymore.  Denies feeling hopeless or helpless.  Anxiety has overall felt manageable.  He has been having panic attacks.  He reports he will be sitting doing nothing and will have a panic attack during which she will pace, become short of breath, and feel like his heart is fluttering/beating abnormally.  In the past 2 months it has happened 3 times.  He is not sure that BuSpar has been helpful.  Hydroxyzine \"never seems to do anything.\"    Back in early 2010s " "was a very heavy drinker (would drink noon-10p nearly everyday, took self to ER one day and was told in renal/liver failure 2014, in hospital for 2 months and was on dialysis 2 years. Then got liver/kidney transplant). Sober for 6 years from alcohol. Alcohol is \"definitely not an option\" he states.  Says he has no cravings and no urges. Can be around it with no problems. Turned things around for self and family.     Per [facility], Dr. Pantera Rizo, during today's team-based visit:  The reason for seeking services at this time is: \"anxiety and panic attacks.\" The problem(s) began many years ago but was less of a problem when he was drinking alcohol heavily. Patient has not attempted to resolve these concerns in the past.    Past diagnoses include: anxiety and panic disorder, history of alcohol use disorder (in sustained remission)  Current medications include: has a current medication list which includes the following prescription(s): apixaban anticoagulant, buspirone, buspirone, hydroxyzine, ketoconazole, levothyroxine, lisinopril, magnesium oxide, metoprolol succinate er, mycophenolate, sulfamethoxazole-trimethoprim, tacrolimus, and tacrolimus.   Medication side effects: Denies  Current stressors include: Symptoms  Coping mechanisms and supports include: Family, Hobbies and Friends    Psychiatric Review of Symptoms Per [facility], Dr. Pantera Rizo, during today's team-based visit:  Depression:     No symptoms  Trinh:             No Symptoms  Psychosis:       No Symptoms  Anxiety:           Excessive worry, Nervousness, Physical complaints, such as headaches, stomachaches, muscle tension, Sleep disturbance, Irritability and Anger outbursts  Panic:              Palpitations, Tremors, Shortness of breath, Tingling and Sense of impending doom Most of the time it is random; but sometimes if he feels something odd in chest/heart  Post Traumatic Stress Disorder:  No Symptoms   Eating Disorder:          No Symptoms  ADD / ADHD:              " "No symptoms  Conduct Disorder:       No symptoms  Autism Spectrum Disorder:     No symptoms  Obsessive Compulsive Disorder:       No Symptoms     Sleep: \"Not very good.\" Hydroxyzine and tried other meds for sleep. Hasn't helped. Has problems with sleep onset/maintenance.     All other ROS negative.     PHQ 4/2/2020 10/7/2020 10/14/2020   PHQ-9 Total Score 3 6 4   Q9: Thoughts of better off dead/self-harm past 2 weeks Not at all Not at all Not at all     DEBBIE-7 SCORE 4/2/2020 10/7/2020 10/14/2020   Total Score - - 5 (mild anxiety)   Total Score 3 5 5     Medical Review of Systems:  10 systems (general, cardiovascular, respiratory, eyes, ENT, endocrine, GI, , M/S, neurological) were reviewed. Most pertinent finding(s) is/are: Chronic pain. The remaining systems are all unremarkable.    A 12-item WHODAS 2.0 assessment was not completed.    Psychiatric History:   Hospitalizations: None  History of Commitment? No   Past Treatment: counseling and medication(s) from physician / PCP  Suicide Attempts: No   Current Suicide Risk: Suicide Assessment Completed Today.  Self-injurious Behavior: Denies  Electroconvulsive Therapy (ECT) or Transcranial Magnetic Stimulation (TMS): No   [employer] Genetic Testing: No     Past medication trials include but are not limited to:   paxil - several years ago and on for a few years before started to drink heavily   Trazodone  Hydroxyzine  Ativan  zoloft jan 2019? - doesn't think he ever got this/took this    Substance Use History:  Current Use of Drugs/Alcohol: Denies   Past Use of Drugs/Alcohol: hx of heavy alcohol use, sober 6 years  Patient reported the following problems as a result of drinking: DUI and Health problems.   Patient has not received chemical dependency treatment in the past  Recovery Programming Involvement: None    Tobacco use: History quit in 2000    Based on the clinical interview, there  Are no current indications of substance use disorder. Continue to monitor. "   Discussed effect of substance use on overall health.     Past Medical History:  Past Medical History:   Diagnosis Date     Alcohol abuse     Last drink in Mid-April 2014     Anemia in ESRD (end-stage renal disease) (H)      Anxiety 2008     Atrial flutter (H) 2017     Cirrhosis (H)     S/P liver transplant     Depression      History of blood transfusion      History of transposition of great vessels     atrial switch at age 8 months old     Hypertension      Liver transplant recipient (H)     2016     Papillary thyroid carcinoma (H)      Pneumonia 11-15-14     Renal transplant recipient     2016     Varices, esophageal (H)       Surgery:   Past Surgical History:   Procedure Laterality Date     ANESTHESIA CARDIOVERSION N/A 3/7/2018    Procedure: ANESTHESIA CARDIOVERSION;  Cardioversion;  Surgeon: GENERIC ANESTHESIA PROVIDER;  Location:  OR     BENCH LIVER N/A 5/10/2016    Procedure: BENCH LIVER;  Surgeon: Ricky Deshpande MD;  [facility]: [facility] OR     BIOPSY LYMPH NODE CERVICAL Right 1/26/2017    Procedure: BIOPSY LYMPH NODE CERVICAL;  Surgeon: Beka Soto MD;  Location:  OR     CARDIAC SURGERY  9-10-80     CREATE FISTULA ARTERIOVENOUS UPPER EXTREMITY Right 9/16/2014    Procedure: CREATE FISTULA ARTERIOVENOUS UPPER EXTREMITY;  Surgeon: Padmaja Eaton MD;  [facility]     CV RIGHT HEART CATH N/A 4/19/2019    Procedure: CV RIGHT HEART CATH;  Surgeon: Sabi Wiggins MD;  Location:  HEART CARDIAC CATH LAB     CYSTOSCOPY, REMOVE STENT(S), COMBINED Right 6/22/2016    Procedure: COMBINED CYSTOSCOPY, REMOVE STENT(S);  Surgeon: Ricky Deshpande MD;  Location:  OR     EMBOLECTOMY UPPER EXTREMITY Right 8/17/2018    Procedure: EMBOLECTOMY UPPER EXTREMITY;  Repair Right Upper Arm Pseudo Aneursym ;  Surgeon: John Payton MD;  [facility]     ENT SURGERY       ESOPHAGOSCOPY, GASTROSCOPY, DUODENOSCOPY (EGD), COMBINED  5/30/2014    Procedure: COMBINED ESOPHAGOSCOPY, GASTROSCOPY,  DUODENOSCOPY (EGD);  Surgeon: Guillaume Bautista MD;  [facility]     ESOPHAGOSCOPY, GASTROSCOPY, DUODENOSCOPY (EGD), COMBINED  9/30/14     ESOPHAGOSCOPY, GASTROSCOPY, DUODENOSCOPY (EGD), COMBINED Left 3/12/2015    Procedure: COMBINED ESOPHAGOSCOPY, GASTROSCOPY, DUODENOSCOPY (EGD);  Surgeon: Laureen Galvan MD;  [facility]     ESOPHAGOSCOPY, GASTROSCOPY, DUODENOSCOPY (EGD), COMBINED N/A 4/21/2016    Procedure: COMBINED ESOPHAGOSCOPY, GASTROSCOPY, DUODENOSCOPY (EGD);  Surgeon: Laureen Galvan MD;  [facility]     ESOPHAGOSCOPY, GASTROSCOPY, DUODENOSCOPY (EGD), COMBINED N/A 7/21/2016    Procedure: COMBINED ESOPHAGOSCOPY, GASTROSCOPY, DUODENOSCOPY (EGD);  Surgeon: Laureen Galvan MD;  [facility]     HC OR CATH ABLATION NON-CARDIAC ENDOVASCULAR  1990,2002    SVT     INSERT PORT VASCULAR ACCESS N/A 4/3/2017    Procedure: INSERT PORT VASCULAR ACCESS;  Surgeon: Rajendra Jacques PA-C;  [facility]     IR PORT REMOVAL LEFT  1/22/2019     LIGATE FISTULA ARTERIOVENOUS UPPER EXTREMITY Right 11/7/2017    Procedure: LIGATE FISTULA ARTERIOVENOUS UPPER EXTREMITY;  Revision of Right Arm Arteriovenous Fistula ;  Surgeon: Padmaja Eaton MD;  [facility]     PERCUTANEOUS BIOPSY KIDNEY Right 9/26/2018    Procedure: PERCUTANEOUS BIOPSY KIDNEY;  Right Kidney Biopsy;  Surgeon: Yuval Khan MD;  [facility]     PERCUTANEOUS BIOPSY KIDNEY Right 10/30/2019    Procedure: Right Side Kidney Biopsy;  Surgeon: Jake Sidhu MD;  [facility]     PICC INSERTION  5/30/14    PICC line placement 5/30/14; Removal 6/9/2014     REMOVE PORT VASCULAR ACCESS Right 1/22/2019    Procedure: Right chest Port Removal;  Surgeon: Erasmo Ziegler PA-C;  [facility]     THORACIC SURGERY  1980    Transposition great arteries, repaired at 8 months     THYROIDECTOMY Right 8/7/2017    Procedure: THYROIDECTOMY;  Bilateral Total Thyroidectomy ;  Surgeon: Beka Soto  MD;  [facility]     TRANSPLANT KIDNEY RECIPIENT  DONOR  5/10/2016    Procedure: TRANSPLANT KIDNEY RECIPIENT  DONOR;  Surgeon: Ricky Deshpande MD;  [facility]     TRANSPLANT LIVER RECIPIENT  DONOR N/A 5/10/2016    Procedure: TRANSPLANT LIVER RECIPIENT  DONOR;  Surgeon: Ricky Deshpande MD;  [facility]     Food and Medicine Allergies:     Allergies   Allergen Reactions     Hydromorphone Itching     Seizures or Head Injury: No  Diet: No Restrictions  Exercise: Not discussed  Supplements: Reviewed per Electronic Medical Record Today    Current Medications:    Current Outpatient Medications:      apixaban ANTICOAGULANT (ELIQUIS ANTICOAGULANT) 5 MG tablet, Take 1 tablet (5 mg) by mouth 2 times daily, Disp: 180 tablet, Rfl: 3     busPIRone (BUSPAR) 15 MG tablet, Take one tablet (15 mg) twice daily for anxiety., Disp: 180 tablet, Rfl: 1     busPIRone (BUSPAR) 5 MG tablet, Take one tablet 5 mg by mouth (take with one 15 mg tablet for 20 mg total) in the morning., Disp: 30 tablet, Rfl: 3     hydrOXYzine (ATARAX) 50 MG tablet, Take 1 tablet (50 mg) by mouth at bedtime as needed, may repeat once for anxiety, Disp: 90 tablet, Rfl: 0     ketoconazole (NIZORAL) 2 % cream, Twice daily to areas of rash on face, Disp: 60 g, Rfl: 1     levothyroxine (SYNTHROID/LEVOTHROID) 137 MCG tablet, Take 1 tablet by mouth Monday - Saturday  and 1.5 tablets on , Disp: 96 tablet, Rfl: 3     lisinopril (ZESTRIL) 10 MG tablet, TAKE ONE TABLET BY MOUTH ONCE DAILY, Disp: 90 tablet, Rfl: 1     magnesium oxide (MAG-OX) 400 MG tablet, TAKE ONE TABLET BY MOUTH TWICE A DAY, Disp: 120 tablet, Rfl: 11     metoprolol succinate ER (TOPROL-XL) 25 MG 24 hr tablet, Take 1 tablet (25 mg) by mouth 2 times daily, Disp: 180 tablet, Rfl: 3     mycophenolate (GENERIC EQUIVALENT) 250 MG capsule, Take 2 capsules (500 mg) by mouth 2 times daily, Disp: 120 capsule, Rfl: 11     sulfamethoxazole-trimethoprim  (BACTRIM/SEPTRA) 400-80 MG tablet, Take 1 tablet by mouth daily, Disp: 30 tablet, Rfl: 11     tacrolimus (GENERIC EQUIVALENT) 0.5 MG capsule, Take 1 capsule (0.5 mg) by mouth every morning Take with 1mg tabs, total of 1.5mg am, 1mg pm, Disp: 90 capsule, Rfl: 3     tacrolimus (GENERIC EQUIVALENT) 1 MG capsule, Take 1 capsule (1 mg) by mouth every 12 hours Take with .5 mg tabs, total of 1.5mg am, 1mg pm, Disp: 60 capsule, Rfl: 11    The [place] Prescription Monitoring Program has been reviewed and there are no concerns about diversionary activity for controlled substances at this time.     Vital Signs:  None since this is a phone/video visit.     Labs:  Most recent laboratory results reviewed and the pertinent results include:   Results Only on 10/06/2020   Component Date Value Ref Range Status     Donor Identification 10/06/2020 05/10/2016   Final     Organ 10/06/2020 Kidney/Liver   Final     DSA Present 10/06/2020 NO   Final     DSA Comments 10/06/2020    Final                    Value: Flow Single Antigen Beads assays are intended for   detection/identification of IgG anti-HLA antibodies. Mfi values may not   accurately quantify donor-specific antibody levels in all instances.       DSA Test Method 10/06/2020 SA FCS   Final   Orders Only on 10/06/2020   Component Date Value Ref Range Status     Magnesium 10/06/2020 2.2  1.6 - 2.3 mg/dL Final     Tacrolimus Last Dose 10/06/2020 10/05/2020 2320   Final     Tacrolimus Level 10/06/2020 4.5* 5.0 - 15.0 ug/L Final    Comment: Tacrolimus Reference Range  Kidney Transplant  Pediatric                      ug/L    0-3 months post transplant   10-12    3-6 months post transplant   8-10    6-12 months post transplant  6-8    >12 months post transplant   4-7  Adult    0-6 months post transplant   8-10    6-12 months post transplant  6-8    >12 months post transplant   4-6    >5 years post transplant     3-5  Heart Transplant  Pediatric    0-12 months post transplant  10-15     >12 months post transplant   5-10  Adult    0-3 months post transplant   10-15    3-6 months post transplant   8-12    6-12 months post transplant  6-12    >12 months post transplant   6-10  Lung Transplant    0-12 months post transplant  10-15    >12 months post transplant   8-12  Liver Transplant  Pediatric    0-3 months post transplant   10-15    3-6 months post transplant   8-10    >6 months post transplant    6-8  Adult    0-3 months post transplant   10-12    3-6 months post transplant   8-10    >6 months post transplant    6-8  Pancrea                           s Transplant    0-6 months post transplant   8-10    >6 months post transplant    5-8  This test was developed and its performance characteristics determined by the   [facility].   It has not been cleared or approved by the FDA. The laboratory is regulated   under CLIA as qualified to perform high-complexity testing. This test is used   for clinical purposes. It should not be regarded as investigational or for   research.       Bilirubin Direct 10/06/2020 0.2  0.0 - 0.2 mg/dL Final     Bilirubin Total 10/06/2020 1.1  0.2 - 1.3 mg/dL Final     Albumin 10/06/2020 4.2  3.4 - 5.0 g/dL Final     Protein Total 10/06/2020 7.3  6.8 - 8.8 g/dL Final     Alkaline Phosphatase 10/06/2020 80  40 - 150 U/L Final     ALT 10/06/2020 38  0 - 70 U/L Final     AST 10/06/2020 17  0 - 45 U/L Final     WBC 10/06/2020 6.2  4.0 - 11.0 10e9/L Final     RBC Count 10/06/2020 5.31  4.4 - 5.9 10e12/L Final     Hemoglobin 10/06/2020 15.8  13.3 - 17.7 g/dL Final     Hematocrit 10/06/2020 46.3  40.0 - 53.0 % Final     MCV 10/06/2020 87  78 - 100 fl Final     MCH 10/06/2020 29.8  26.5 - 33.0 pg Final     MCHC 10/06/2020 34.1  31.5 - 36.5 g/dL Final     RDW 10/06/2020 13.4  10.0 - 15.0 % Final     Platelet Count 10/06/2020 157  150 - 450 10e9/L Final     Sodium 10/06/2020 137  133 - 144 mmol/L Final     Potassium  10/06/2020 4.2  3.4 - 5.3 mmol/L Final     Chloride 10/06/2020 105  94 - 109 mmol/L Final     Carbon Dioxide 10/06/2020 26  20 - 32 mmol/L Final     Anion Gap 10/06/2020 6  3 - 14 mmol/L Final     Glucose 10/06/2020 87  70 - 99 mg/dL Final     Urea Nitrogen 10/06/2020 15  7 - 30 mg/dL Final     Creatinine 10/06/2020 1.44* 0.66 - 1.25 mg/dL Final     GFR Estimate 10/06/2020 60* >60 mL/min/[1.73_m2] Final    Comment: Non  GFR Calc  Starting 12/18/2018, serum creatinine based estimated GFR (eGFR) will be   calculated using the Chronic Kidney Disease Epidemiology Collaboration   (CKD-EPI) equation.       GFR Estimate If Black 10/06/2020 70  >60 mL/min/[1.73_m2] Final    Comment:  GFR Calc  Starting 12/18/2018, serum creatinine based estimated GFR (eGFR) will be   calculated using the Chronic Kidney Disease Epidemiology Collaboration   (CKD-EPI) equation.       Calcium 10/06/2020 9.0  8.5 - 10.1 mg/dL Final     SA1 Test Method 10/06/2020 SA FCS   Final     SA1 Cell 10/06/2020 Class I   Final     SA1 Hi Risk Kori 10/06/2020 None   Final     SA1 Mod Risk Kori 10/06/2020 A:66   Final     SA1 Comments 10/06/2020    Final                    Value: Test performed by modified procedure. Serum heat inactivated and tested   by a modified (Ewing) protocol including fetal calf serum addition.   High-risk, mfi >3,000. Mod-risk, mfi 500-3,000.       SA2 Test Method 10/06/2020 SA FCS   Final     SA2 Cell 10/06/2020 Class II   Final     SA2 Hi Risk Kori 10/06/2020 None   Final     SA2 Mod Risk Kori 10/06/2020 None   Final     SA2 Comments 10/06/2020    Final                    Value: Test performed by modified procedure. Serum heat inactivated and tested   by a modified (Ewing) protocol including fetal calf serum addition.   High-risk, mfi >3,000. Mod-risk, mfi 500-3,000.     Orders Only on 09/01/2020   Component Date Value Ref Range Status     Magnesium 09/01/2020 2.0  1.6 - 2.3 mg/dL Final     Tacrolimus  Last Dose 09/01/2020 8/31/2020 6205   Final     Tacrolimus Level 09/01/2020 4.5* 5.0 - 15.0 ug/L Final    Comment: Tacrolimus Reference Range  Kidney Transplant  Pediatric                      ug/L    0-3 months post transplant   10-12    3-6 months post transplant   8-10    6-12 months post transplant  6-8    >12 months post transplant   4-7  Adult    0-6 months post transplant   8-10    6-12 months post transplant  6-8    >12 months post transplant   4-6    >5 years post transplant     3-5  Heart Transplant  Pediatric    0-12 months post transplant  10-15    >12 months post transplant   5-10  Adult    0-3 months post transplant   10-15    3-6 months post transplant   8-12    6-12 months post transplant  6-12    >12 months post transplant   6-10  Lung Transplant    0-12 months post transplant  10-15    >12 months post transplant   8-12  Liver Transplant  Pediatric    0-3 months post transplant   10-15    3-6 months post transplant   8-10    >6 months post transplant    6-8  Adult    0-3 months post transplant   10-12    3-6 months post transplant   8-10    >6 months post transplant    6-8  Pancrea                           s Transplant    0-6 months post transplant   8-10    >6 months post transplant    5-8  This test was developed and its performance characteristics determined by the   [facility].   It has not been cleared or approved by the FDA. The laboratory is regulated   under CLIA as qualified to perform high-complexity testing. This test is used   for clinical purposes. It should not be regarded as investigational or for   research.       WBC 09/01/2020 6.1  4.0 - 11.0 10e9/L Final     RBC Count 09/01/2020 5.14  4.4 - 5.9 10e12/L Final     Hemoglobin 09/01/2020 15.3  13.3 - 17.7 g/dL Final     Hematocrit 09/01/2020 45.4  40.0 - 53.0 % Final     MCV 09/01/2020 88  78 - 100 fl Final     MCH 09/01/2020 29.8  26.5 - 33.0 pg Final     MCHC  09/01/2020 33.7  31.5 - 36.5 g/dL Final     RDW 09/01/2020 13.6  10.0 - 15.0 % Final     Platelet Count 09/01/2020 150  150 - 450 10e9/L Final     Sodium 09/01/2020 140  133 - 144 mmol/L Final     Potassium 09/01/2020 4.1  3.4 - 5.3 mmol/L Final     Chloride 09/01/2020 107  94 - 109 mmol/L Final     Carbon Dioxide 09/01/2020 26  20 - 32 mmol/L Final     Anion Gap 09/01/2020 7  3 - 14 mmol/L Final     Glucose 09/01/2020 94  70 - 99 mg/dL Final     Urea Nitrogen 09/01/2020 16  7 - 30 mg/dL Final     Creatinine 09/01/2020 1.58* 0.66 - 1.25 mg/dL Final     GFR Estimate 09/01/2020 54* >60 mL/min/[1.73_m2] Final    Comment: Non  GFR Calc  Starting 12/18/2018, serum creatinine based estimated GFR (eGFR) will be   calculated using the Chronic Kidney Disease Epidemiology Collaboration   (CKD-EPI) equation.       GFR Estimate If Black 09/01/2020 62  >60 mL/min/[1.73_m2] Final    Comment:  GFR Calc  Starting 12/18/2018, serum creatinine based estimated GFR (eGFR) will be   calculated using the Chronic Kidney Disease Epidemiology Collaboration   (CKD-EPI) equation.       Calcium 09/01/2020 9.4  8.5 - 10.1 mg/dL Final     Bilirubin Total 09/01/2020 0.5  0.2 - 1.3 mg/dL Final     Albumin 09/01/2020 4.1  3.4 - 5.0 g/dL Final     Protein Total 09/01/2020 7.0  6.8 - 8.8 g/dL Final     Alkaline Phosphatase 09/01/2020 69  40 - 150 U/L Final     ALT 09/01/2020 37  0 - 70 U/L Final     AST 09/01/2020 13  0 - 45 U/L Final     IGG 09/01/2020 739  610 - 1,616 mg/dL Final     Bilirubin Direct 09/01/2020 0.1  0.0 - 0.2 mg/dL Final   Orders Only on 08/04/2020   Component Date Value Ref Range Status     Magnesium 08/04/2020 1.8  1.6 - 2.3 mg/dL Final     Tacrolimus Last Dose 08/04/2020 8/03/2020 2315   Final     Tacrolimus Level 08/04/2020 3.7* 5.0 - 15.0 ug/L Final    Comment: Tacrolimus Reference Range  Kidney Transplant  Pediatric                      ug/L    0-3 months post transplant   10-12    3-6 months  post transplant   8-10    6-12 months post transplant  6-8    >12 months post transplant   4-7  Adult    0-6 months post transplant   8-10    6-12 months post transplant  6-8    >12 months post transplant   4-6    >5 years post transplant     3-5  Heart Transplant  Pediatric    0-12 months post transplant  10-15    >12 months post transplant   5-10  Adult    0-3 months post transplant   10-15    3-6 months post transplant   8-12    6-12 months post transplant  6-12    >12 months post transplant   6-10  Lung Transplant    0-12 months post transplant  10-15    >12 months post transplant   8-12  Liver Transplant  Pediatric    0-3 months post transplant   10-15    3-6 months post transplant   8-10    >6 months post transplant    6-8  Adult    0-3 months post transplant   10-12    3-6 months post transplant   8-10    >6 months post transplant    6-8  [facility]                           s Transplant    0-6 months post transplant   8-10    >6 months post transplant    5-8  This test was developed and its performance characteristics determined by the   [facility].   It has not been cleared or approved by the FDA. The laboratory is regulated   under CLIA as qualified to perform high-complexity testing. This test is used   for clinical purposes. It should not be regarded as investigational or for   research.       Bilirubin Direct 08/04/2020 0.1  0.0 - 0.2 mg/dL Final     Bilirubin Total 08/04/2020 0.5  0.2 - 1.3 mg/dL Final     Albumin 08/04/2020 4.0  3.4 - 5.0 g/dL Final     Protein Total 08/04/2020 6.7* 6.8 - 8.8 g/dL Final     Alkaline Phosphatase 08/04/2020 70  40 - 150 U/L Final     ALT 08/04/2020 46  0 - 70 U/L Final     AST 08/04/2020 22  0 - 45 U/L Final     WBC 08/04/2020 5.3  4.0 - 11.0 10e9/L Final     RBC Count 08/04/2020 5.14  4.4 - 5.9 10e12/L Final     Hemoglobin 08/04/2020 15.2  13.3 - 17.7 g/dL Final     Hematocrit 08/04/2020 45.3  40.0 - 53.0  % Final     MCV 08/04/2020 88  78 - 100 fl Final     MCH 08/04/2020 29.6  26.5 - 33.0 pg Final     MCHC 08/04/2020 33.6  31.5 - 36.5 g/dL Final     RDW 08/04/2020 13.4  10.0 - 15.0 % Final     Platelet Count 08/04/2020 142* 150 - 450 10e9/L Final     Sodium 08/04/2020 139  133 - 144 mmol/L Final     Potassium 08/04/2020 4.0  3.4 - 5.3 mmol/L Final     Chloride 08/04/2020 108  94 - 109 mmol/L Final     Carbon Dioxide 08/04/2020 25  20 - 32 mmol/L Final     Anion Gap 08/04/2020 6  3 - 14 mmol/L Final     Glucose 08/04/2020 92  70 - 99 mg/dL Final     Urea Nitrogen 08/04/2020 15  7 - 30 mg/dL Final     Creatinine 08/04/2020 1.53* 0.66 - 1.25 mg/dL Final     GFR Estimate 08/04/2020 56* >60 mL/min/[1.73_m2] Final    Comment: Non  GFR Calc  Starting 12/18/2018, serum creatinine based estimated GFR (eGFR) will be   calculated using the Chronic Kidney Disease Epidemiology Collaboration   (CKD-EPI) equation.       GFR Estimate If Black 08/04/2020 65  >60 mL/min/[1.73_m2] Final    Comment:  GFR Calc  Starting 12/18/2018, serum creatinine based estimated GFR (eGFR) will be   calculated using the Chronic Kidney Disease Epidemiology Collaboration   (CKD-EPI) equation.       Calcium 08/04/2020 9.0  8.5 - 10.1 mg/dL Final   Orders Only on 07/07/2020   Component Date Value Ref Range Status     Magnesium 07/07/2020 1.8  1.6 - 2.3 mg/dL Final     Tacrolimus Last Dose 07/07/2020 7/06/2020 2315   Final     Tacrolimus Level 07/07/2020 4.4* 5.0 - 15.0 ug/L Final    Comment: Tacrolimus Reference Range  Kidney Transplant  Pediatric                      ug/L    0-3 months post transplant   10-12    3-6 months post transplant   8-10    6-12 months post transplant  6-8    >12 months post transplant   4-7  Adult    0-6 months post transplant   8-10    6-12 months post transplant  6-8    >12 months post transplant   4-6    >5 years post transplant     3-5  Heart Transplant  Pediatric    0-12 months post  transplant  10-15    >12 months post transplant   5-10  Adult    0-3 months post transplant   10-15    3-6 months post transplant   8-12    6-12 months post transplant  6-12    >12 months post transplant   6-10  Lung Transplant    0-12 months post transplant  10-15    >12 months post transplant   8-12  Liver Transplant  Pediatric    0-3 months post transplant   10-15    3-6 months post transplant   8-10    >6 months post transplant    6-8  Adult    0-3 months post transplant   10-12    3-6 months post transplant   8-10    >6 months post transplant    6-8  Pancrea                           s Transplant    0-6 months post transplant   8-10    >6 months post transplant    5-8  This test was developed and its performance characteristics determined by the   [facility].   It has not been cleared or approved by the FDA. The laboratory is regulated   under CLIA as qualified to perform high-complexity testing. This test is used   for clinical purposes. It should not be regarded as investigational or for   research.       Bilirubin Direct 07/07/2020 <0.1  0.0 - 0.2 mg/dL Final     Bilirubin Total 07/07/2020 0.5  0.2 - 1.3 mg/dL Final     Albumin 07/07/2020 4.1  3.4 - 5.0 g/dL Final     Protein Total 07/07/2020 7.2  6.8 - 8.8 g/dL Final     Alkaline Phosphatase 07/07/2020 58  40 - 150 U/L Final     ALT 07/07/2020 45  0 - 70 U/L Final     AST 07/07/2020 25  0 - 45 U/L Final     WBC 07/07/2020 4.5  4.0 - 11.0 10e9/L Final     RBC Count 07/07/2020 5.01  4.4 - 5.9 10e12/L Final     Hemoglobin 07/07/2020 14.6  13.3 - 17.7 g/dL Final     Hematocrit 07/07/2020 44.5  40.0 - 53.0 % Final     MCV 07/07/2020 89  78 - 100 fl Final     MCH 07/07/2020 29.1  26.5 - 33.0 pg Final     MCHC 07/07/2020 32.8  31.5 - 36.5 g/dL Final     RDW 07/07/2020 14.0  10.0 - 15.0 % Final     Platelet Count 07/07/2020 148* 150 - 450 10e9/L Final     Sodium 07/07/2020 138  133 - 144 mmol/L  Final     Potassium 07/07/2020 4.2  3.4 - 5.3 mmol/L Final     Chloride 07/07/2020 106  94 - 109 mmol/L Final     Carbon Dioxide 07/07/2020 25  20 - 32 mmol/L Final     Anion Gap 07/07/2020 7  3 - 14 mmol/L Final     Glucose 07/07/2020 91  70 - 99 mg/dL Final     Urea Nitrogen 07/07/2020 11  7 - 30 mg/dL Final     Creatinine 07/07/2020 1.50* 0.66 - 1.25 mg/dL Final     GFR Estimate 07/07/2020 57* >60 mL/min/[1.73_m2] Final    Comment: Non  GFR Calc  Starting 12/18/2018, serum creatinine based estimated GFR (eGFR) will be   calculated using the Chronic Kidney Disease Epidemiology Collaboration   (CKD-EPI) equation.       GFR Estimate If Black 07/07/2020 67  >60 mL/min/[1.73_m2] Final    Comment:  GFR Calc  Starting 12/18/2018, serum creatinine based estimated GFR (eGFR) will be   calculated using the Chronic Kidney Disease Epidemiology Collaboration   (CKD-EPI) equation.       Calcium 07/07/2020 9.2  8.5 - 10.1 mg/dL Final   Orders Only on 06/16/2020   Component Date Value Ref Range Status     Cortisol Serum 06/16/2020 20.8  4 - 22 ug/dL Final    Comment: 8 AM Cortisol Reference Range = 4-22 ug/dL  4 PM Cortisol Reference Range = 3-17 ug/dL       Cholesterol 06/16/2020 158  <200 mg/dL Final     Triglycerides 06/16/2020 101  <150 mg/dL Final     HDL Cholesterol 06/16/2020 38* >39 mg/dL Final     LDL Cholesterol Calculated 06/16/2020 100* <100 mg/dL Final    Desirable:       <100 mg/dl     Non HDL Cholesterol 06/16/2020 120  <130 mg/dL Final     Protein Random Urine 06/16/2020 0.14  g/L Final     Protein Total Urine g/gr Creatinine 06/16/2020 0.08  0 - 0.2 g/g Cr Final     Magnesium 06/16/2020 2.1  1.6 - 2.3 mg/dL Final     Bilirubin Direct 06/16/2020 0.2  0.0 - 0.2 mg/dL Final     Bilirubin Total 06/16/2020 0.8  0.2 - 1.3 mg/dL Final     Albumin 06/16/2020 4.5  3.4 - 5.0 g/dL Final     Protein Total 06/16/2020 7.4  6.8 - 8.8 g/dL Final     Alkaline Phosphatase 06/16/2020 55  40 - 150  U/L Final     ALT 06/16/2020 24  0 - 70 U/L Final     AST 06/16/2020 15  0 - 45 U/L Final     WBC 06/16/2020 4.5  4.0 - 11.0 10e9/L Final     RBC Count 06/16/2020 4.92  4.4 - 5.9 10e12/L Final     Hemoglobin 06/16/2020 14.5  13.3 - 17.7 g/dL Final     Hematocrit 06/16/2020 42.2  40.0 - 53.0 % Final     MCV 06/16/2020 86  78 - 100 fl Final     MCH 06/16/2020 29.5  26.5 - 33.0 pg Final     MCHC 06/16/2020 34.4  31.5 - 36.5 g/dL Final     RDW 06/16/2020 12.8  10.0 - 15.0 % Final     Platelet Count 06/16/2020 166  150 - 450 10e9/L Final     Sodium 06/16/2020 136  133 - 144 mmol/L Final     Potassium 06/16/2020 4.2  3.4 - 5.3 mmol/L Final     Chloride 06/16/2020 107  94 - 109 mmol/L Final     Carbon Dioxide 06/16/2020 25  20 - 32 mmol/L Final     Anion Gap 06/16/2020 4  3 - 14 mmol/L Final     Glucose 06/16/2020 96  70 - 99 mg/dL Final     Urea Nitrogen 06/16/2020 15  7 - 30 mg/dL Final     Creatinine 06/16/2020 1.61* 0.66 - 1.25 mg/dL Final     GFR Estimate 06/16/2020 53* >60 mL/min/[1.73_m2] Final    Comment: Non  GFR Calc  Starting 12/18/2018, serum creatinine based estimated GFR (eGFR) will be   calculated using the Chronic Kidney Disease Epidemiology Collaboration   (CKD-EPI) equation.       GFR Estimate If Black 06/16/2020 61  >60 mL/min/[1.73_m2] Final    Comment:  GFR Calc  Starting 12/18/2018, serum creatinine based estimated GFR (eGFR) will be   calculated using the Chronic Kidney Disease Epidemiology Collaboration   (CKD-EPI) equation.       Calcium 06/16/2020 9.4  8.5 - 10.1 mg/dL Final     Color Urine 06/16/2020 Yellow   Final     Appearance Urine 06/16/2020 Clear   Final     Glucose Urine 06/16/2020 Negative  NEG^Negative mg/dL Final     Bilirubin Urine 06/16/2020 Negative  NEG^Negative Final     Ketones Urine 06/16/2020 Negative  NEG^Negative mg/dL Final     Specific Gravity Urine 06/16/2020 1.015  1.003 - 1.035 Final     Blood Urine 06/16/2020 Negative  NEG^Negative Final      pH Urine 06/16/2020 7.0  5.0 - 7.0 pH Final     Protein Albumin Urine 06/16/2020 Negative  NEG^Negative mg/dL Final     Urobilinogen Urine 06/16/2020 0.2  0.2 - 1.0 EU/dL Final     Nitrite Urine 06/16/2020 Negative  NEG^Negative Final     Leukocyte Esterase Urine 06/16/2020 Negative  NEG^Negative Final     Source 06/16/2020 Midstream Urine   Final     Creatinine Urine 06/16/2020 167  mg/dL Final   Orders Only on 06/02/2020   Component Date Value Ref Range Status     Lactate Dehydrogenase 06/02/2020 137  85 - 227 U/L Final     EBV DNA Copies/mL 06/02/2020 EBV DNA Not Detected  EBVNEG^EBV DNA Not Detected [Copies]/mL Final     EBV DNA Log of Copies 06/02/2020 Not Calculated  <2.7 [Log_copies]/mL Final    Comment: The Real-Time quantitative EBV assay was developed and its performance   characteristics determined by the [facility] at   the [facility] in [place], [place].  The   primers and probes are Analyte Specific Reagents (ASRs) manufactured  by   [employer].  ASRs are used in many laboratory tests necessary for standard medical care and   generally do not require U.S. Food and Drug Administration approval.  The FDA   has determined that such clearance or approval is not necessary.  This test   is used for clinical purposes.  It should not be regarded as investigational   or research.  This laboratory is certified under the Clinical Laboratory Improvement   Amendments of 1988 (CLIA-88) as qualified to perform high complexity clinical   laboratory testing.  The quantitative range of this assay is 500-22,500,00 copies/mL (2.7-7.4 log   copies/mL).  A negative result does not rule out the presence of PCR   inhibitors in the patient spe                           cimen or EBV DNA nucleic acid in concentrations   below the level of detection of the assay.  Inhibition may also lead to   underestimation of viral quantitation.       Magnesium 06/02/2020 2.1   1.6 - 2.3 mg/dL Final     WBC 06/02/2020 4.6  4.0 - 11.0 10e9/L Final     RBC Count 06/02/2020 5.06  4.4 - 5.9 10e12/L Final     Hemoglobin 06/02/2020 14.8  13.3 - 17.7 g/dL Final     Hematocrit 06/02/2020 42.6  40.0 - 53.0 % Final     MCV 06/02/2020 84  78 - 100 fl Final     MCH 06/02/2020 29.2  26.5 - 33.0 pg Final     MCHC 06/02/2020 34.7  31.5 - 36.5 g/dL Final     RDW 06/02/2020 12.7  10.0 - 15.0 % Final     Platelet Count 06/02/2020 151  150 - 450 10e9/L Final     Diff Method 06/02/2020 Automated Method   Final     % Neutrophils 06/02/2020 67.2  % Final     % Lymphocytes 06/02/2020 21.7  % Final     % Monocytes 06/02/2020 9.9  % Final     % Eosinophils 06/02/2020 0.8  % Final     % Basophils 06/02/2020 0.4  % Final     Absolute Neutrophil 06/02/2020 3.3  1.6 - 8.3 10e9/L Final     Absolute Lymphocytes 06/02/2020 1.1  0.8 - 5.3 10e9/L Final     Absolute Monocytes 06/02/2020 0.5  0.0 - 1.3 10e9/L Final     Absolute Eosinophils 06/02/2020 0.0  0.0 - 0.7 10e9/L Final     Absolute Basophils 06/02/2020 0.0  0.0 - 0.2 10e9/L Final     Sodium 06/02/2020 134  133 - 144 mmol/L Final     Potassium 06/02/2020 4.1  3.4 - 5.3 mmol/L Final     Chloride 06/02/2020 104  94 - 109 mmol/L Final     Carbon Dioxide 06/02/2020 25  20 - 32 mmol/L Final     Anion Gap 06/02/2020 5  3 - 14 mmol/L Final     Glucose 06/02/2020 88  70 - 99 mg/dL Final     Urea Nitrogen 06/02/2020 12  7 - 30 mg/dL Final     Creatinine 06/02/2020 1.51* 0.66 - 1.25 mg/dL Final     GFR Estimate 06/02/2020 57* >60 mL/min/[1.73_m2] Final    Comment: Non  GFR Calc  Starting 12/18/2018, serum creatinine based estimated GFR (eGFR) will be   calculated using the Chronic Kidney Disease Epidemiology Collaboration   (CKD-EPI) equation.       GFR Estimate If Black 06/02/2020 66  >60 mL/min/[1.73_m2] Final    Comment:  GFR Calc  Starting 12/18/2018, serum creatinine based estimated GFR (eGFR) will be   calculated using the Chronic Kidney  Disease Epidemiology Collaboration   (CKD-EPI) equation.       Calcium 06/02/2020 9.3  8.5 - 10.1 mg/dL Final     Bilirubin Total 06/02/2020 1.0  0.2 - 1.3 mg/dL Final     Albumin 06/02/2020 4.4  3.4 - 5.0 g/dL Final     Protein Total 06/02/2020 7.3  6.8 - 8.8 g/dL Final     Alkaline Phosphatase 06/02/2020 64  40 - 150 U/L Final     ALT 06/02/2020 23  0 - 70 U/L Final     AST 06/02/2020 18  0 - 45 U/L Final   Results Only on 05/05/2020   Component Date Value Ref Range Status     Donor Identification 05/05/2020 05/10/2016   Final     Organ 05/05/2020 Kidney/Liver   Final     DSA Present 05/05/2020 NO   Final     DSA Comments 05/05/2020    Final                    Value: Flow Single Antigen Beads assays are intended for   detection/identification of IgG anti-HLA antibodies. Mfi values may not   accurately quantify donor-specific antibody levels in all instances.       DSA Test Method 05/05/2020 SA FCS   Final   Orders Only on 05/05/2020   Component Date Value Ref Range Status     Magnesium 05/05/2020 1.8  1.6 - 2.3 mg/dL Final     Sodium 05/05/2020 140  133 - 144 mmol/L Final     Potassium 05/05/2020 4.3  3.4 - 5.3 mmol/L Final     Chloride 05/05/2020 108  94 - 109 mmol/L Final     Carbon Dioxide 05/05/2020 27  20 - 32 mmol/L Final     Anion Gap 05/05/2020 5  3 - 14 mmol/L Final     Glucose 05/05/2020 108* 70 - 99 mg/dL Final     Urea Nitrogen 05/05/2020 18  7 - 30 mg/dL Final     Creatinine 05/05/2020 1.51* 0.66 - 1.25 mg/dL Final     GFR Estimate 05/05/2020 57* >60 mL/min/[1.73_m2] Final    Comment: Non  GFR Calc  Starting 12/18/2018, serum creatinine based estimated GFR (eGFR) will be   calculated using the Chronic Kidney Disease Epidemiology Collaboration   (CKD-EPI) equation.       GFR Estimate If Black 05/05/2020 66  >60 mL/min/[1.73_m2] Final    Comment:  GFR Calc  Starting 12/18/2018, serum creatinine based estimated GFR (eGFR) will be   calculated using the Chronic Kidney  Disease Epidemiology Collaboration   (CKD-EPI) equation.       Calcium 05/05/2020 9.3  8.5 - 10.1 mg/dL Final     WBC 05/05/2020 5.8  4.0 - 11.0 10e9/L Final     RBC Count 05/05/2020 5.32  4.4 - 5.9 10e12/L Final     Hemoglobin 05/05/2020 15.6  13.3 - 17.7 g/dL Final     Hematocrit 05/05/2020 46.6  40.0 - 53.0 % Final     MCV 05/05/2020 88  78 - 100 fl Final     MCH 05/05/2020 29.3  26.5 - 33.0 pg Final     MCHC 05/05/2020 33.5  31.5 - 36.5 g/dL Final     RDW 05/05/2020 13.0  10.0 - 15.0 % Final     Platelet Count 05/05/2020 160  150 - 450 10e9/L Final     Tacrolimus Last Dose 05/05/2020 5/04/2020 1120   Final     Tacrolimus Level 05/05/2020 4.9* 5.0 - 15.0 ug/L Final    Comment: Tacrolimus Reference Range  Kidney Transplant  Pediatric                      ug/L    0-3 months post transplant   10-12    3-6 months post transplant   8-10    6-12 months post transplant  6-8    >12 months post transplant   4-7  Adult    0-6 months post transplant   8-10    6-12 months post transplant  6-8    >12 months post transplant   4-6    >5 years post transplant     3-5  Heart Transplant  Pediatric    0-12 months post transplant  10-15    >12 months post transplant   5-10  Adult    0-3 months post transplant   10-15    3-6 months post transplant   8-12    6-12 months post transplant  6-12    >12 months post transplant   6-10  Lung Transplant    0-12 months post transplant  10-15    >12 months post transplant   8-12  Liver Transplant  Pediatric    0-3 months post transplant   10-15    3-6 months post transplant   8-10    >6 months post transplant    6-8  Adult    0-3 months post transplant   10-12    3-6 months post transplant   8-10    >6 months post transplant    6-8  Pancrea                           s Transplant    0-6 months post transplant   8-10    >6 months post transplant    5-8  This test was developed and its performance characteristics determined by the   [facility].   It has not been cleared or approved by the FDA. The laboratory is regulated   under CLIA as qualified to perform high-complexity testing. This test is used   for clinical purposes. It should not be regarded as investigational or for   research.       Bilirubin Direct 05/05/2020 0.1  0.0 - 0.2 mg/dL Final     Bilirubin Total 05/05/2020 0.3  0.2 - 1.3 mg/dL Final     Albumin 05/05/2020 4.2  3.4 - 5.0 g/dL Final     Protein Total 05/05/2020 7.4  6.8 - 8.8 g/dL Final     Alkaline Phosphatase 05/05/2020 61  40 - 150 U/L Final     ALT 05/05/2020 20  0 - 70 U/L Final     AST 05/05/2020 14  0 - 45 U/L Final     SA1 Test Method 05/05/2020  FCS   Final     SA1 Cell 05/05/2020 Class I   Final     SA1 Hi Risk Kori 05/05/2020 None   Final     SA1 Mod Risk Kori 05/05/2020 None   Final     SA1 Comments 05/05/2020    Final                    Value: Test performed by modified procedure. Serum heat inactivated and tested   by a modified ([place]) protocol including fetal calf serum addition.   High-risk, mfi >3,000. Mod-risk, mfi 500-3,000.       SA2 Test Method 05/05/2020  FCS   Final     SA2 Cell 05/05/2020 Class II   Final     SA2 Hi Risk Kori 05/05/2020 None   Final     SA2 Mod Risk Kori 05/05/2020 None   Final     SA2 Comments 05/05/2020    Final                    Value: Test performed by modified procedure. Serum heat inactivated and tested   by a modified ([place]) protocol including fetal calf serum addition.   High-risk, mfi >3,000. Mod-risk, mfi 500-3,000.       Protein Random Urine 05/05/2020 0.14  g/L Final     Protein Total Urine g/gr Creatinine 05/05/2020 0.09  0 - 0.2 g/g Cr Final     Creatinine Urine 05/05/2020 166  mg/dL Final   Orders Only on 04/07/2020   Component Date Value Ref Range Status     Sodium 04/07/2020 138  133 - 144 mmol/L Final     Potassium 04/07/2020 4.5  3.4 - 5.3 mmol/L Final     Chloride 04/07/2020 108  94 - 109 mmol/L Final     Carbon Dioxide 04/07/2020 27  20 - 32 mmol/L Final      Anion Gap 04/07/2020 3  3 - 14 mmol/L Final     Glucose 04/07/2020 106* 70 - 99 mg/dL Final     Urea Nitrogen 04/07/2020 24  7 - 30 mg/dL Final     Creatinine 04/07/2020 1.63* 0.66 - 1.25 mg/dL Final     GFR Estimate 04/07/2020 52* >60 mL/min/[1.73_m2] Final    Comment: Non  GFR Calc  Starting 12/18/2018, serum creatinine based estimated GFR (eGFR) will be   calculated using the Chronic Kidney Disease Epidemiology Collaboration   (CKD-EPI) equation.       GFR Estimate If Black 04/07/2020 60* >60 mL/min/[1.73_m2] Final    Comment:  GFR Calc  Starting 12/18/2018, serum creatinine based estimated GFR (eGFR) will be   calculated using the Chronic Kidney Disease Epidemiology Collaboration   (CKD-EPI) equation.       Calcium 04/07/2020 8.8  8.5 - 10.1 mg/dL Final     WBC 04/07/2020 5.2  4.0 - 11.0 10e9/L Final     RBC Count 04/07/2020 5.16  4.4 - 5.9 10e12/L Final     Hemoglobin 04/07/2020 14.9  13.3 - 17.7 g/dL Final     Hematocrit 04/07/2020 43.9  40.0 - 53.0 % Final     MCV 04/07/2020 85  78 - 100 fl Final     MCH 04/07/2020 28.9  26.5 - 33.0 pg Final     MCHC 04/07/2020 33.9  31.5 - 36.5 g/dL Final     RDW 04/07/2020 12.9  10.0 - 15.0 % Final     Platelet Count 04/07/2020 156  150 - 450 10e9/L Final     Tacrolimus Last Dose 04/07/2020 4/06/2020 2315   Final     Tacrolimus Level 04/07/2020 5.9  5.0 - 15.0 ug/L Final    Comment: Tacrolimus Reference Range  Kidney Transplant  Pediatric                      ug/L    0-3 months post transplant   10-12    3-6 months post transplant   8-10    6-12 months post transplant  6-8    >12 months post transplant   4-7  Adult    0-6 months post transplant   8-10    6-12 months post transplant  6-8    >12 months post transplant   4-6    >5 years post transplant     3-5  Heart Transplant  Pediatric    0-12 months post transplant  10-15    >12 months post transplant   5-10  Adult    0-3 months post transplant   10-15    3-6 months post transplant   8-12     6-12 months post transplant  6-12    >12 months post transplant   6-10  Lung Transplant    0-12 months post transplant  10-15    >12 months post transplant   8-12  Liver Transplant  Pediatric    0-3 months post transplant   10-15    3-6 months post transplant   8-10    >6 months post transplant    6-8  Adult    0-3 months post transplant   10-12    3-6 months post transplant   8-10    >6 months post transplant    6-8  Pancrea                           s Transplant    0-6 months post transplant   8-10    >6 months post transplant    5-8  This test was developed and its performance characteristics determined by the   [facility].   It has not been cleared or approved by the FDA. The laboratory is regulated   under CLIA as qualified to perform high-complexity testing. This test is used   for clinical purposes. It should not be regarded as investigational or for   research.       Bilirubin Direct 04/07/2020 0.1  0.0 - 0.2 mg/dL Final     Bilirubin Total 04/07/2020 0.4  0.2 - 1.3 mg/dL Final     Albumin 04/07/2020 4.2  3.4 - 5.0 g/dL Final     Protein Total 04/07/2020 7.1  6.8 - 8.8 g/dL Final     Alkaline Phosphatase 04/07/2020 69  40 - 150 U/L Final     ALT 04/07/2020 28  0 - 70 U/L Final     AST 04/07/2020 16  0 - 45 U/L Final     Magnesium 04/07/2020 2.1  1.6 - 2.3 mg/dL Final     Most recent EKG from 10/02/20 reviewed (abnormal). QTc interval 480.      Family History:   Patient reported family history includes:   Family History   Problem Relation Age of Onset     No Known Problems Brother      Arthritis Father      Hypertension Father      Hypertension Mother      Anxiety Disorder Mother      Hypertension Sister      Anxiety Disorder Sister      No Known Problems Maternal Grandmother      No Known Problems Maternal Grandfather      No Known Problems Paternal Grandmother      No Known Problems Brother      No Known Problems Paternal Grandfather       "Alcohol/Drug No family hx of      Gastrointestinal Disease No family hx of         no fam hx of liver disease or liver cancer     Mental Illness History: Yes: Per EPIC Electronic Medical Record  Substance Abuse History: Yes: Per EPIC Electronic Medical Record  Suicide History: Unknown  Medications: Unknown     Social History Per [facility], Dr. Pantera Rizo, during today's team-based visit:  Patient reported they grew up in [place], [place].  They were raised by biological parents. Patient reported that he/him/his childhood was \"good. No complaints.\" He has 1 older sister and 2 older brothers.  Patient denied experiencing childhood abuse/neglect. Patient described their current relationships with family of origin as \"good.\"       The patient has never  and has no children. Patient reported having few good friends.      Cultural influences and impact on patient's life structure, values, norms, and healthcare:  . Patient identified their preferred language to be English. Patient reported they does not need the assistance of an  or other support involved in treatment.      Patient reported he/him/his highest education level was high school graduate. The patient did not serve in the . Patient is currently employed full time and reports they are able to function appropriately at work. Works for a 's education program.    Firearms/Weapons Access: No: Patient denies   Service: No    Legal History:  Yes: DUI many years ago    Significant Losses / Trauma / Abuse / Neglect Issues:  There are indications or report of significant loss, trauma, abuse or neglect issues related to: Significant health problems, multiple major surgeries.   Issues of possible neglect are not present.     Comprehensive Examination (limited due to virtual visit format, phone):  Vital Signs:  Vitals: There were no vitals taken for this visit.  General/Constitutional:  Appearance: unable to assess  Attitude:  Cooperative, " pleasant   Eye Contact:  unable to assess  Musculoskeletal:  Muscle Strength and Tone: unable to assess  Psychomotor Behavior:  unable to assess  Gait and Station: unable to assess  Psychiatric:  Speech:  clear, coherent, regular rate, rhythm, and volume  Associations:  no loose associations  Thought Process:  logical, linear and goal oriented  Thought Content:  no evidence of suicidal ideation or homicidal ideation, no evidence of psychotic thought, no auditory hallucinations present and no visual hallucinations present  Mood:  anxious  Affect:  appropriate and in normal range and mood congruent  Insight:  good  Judgment:  intact, adequate for safety  Impulse Control:  intact  Neurological:  Oriented to:  person, place, time, and situation  Attention Span and Concentration:  normal  Language: intact  Recent and Remote Memory:  Intact to interview. Not formally assessed. No amnesia.  Fund of Knowledge: appropriate    Strengths and Opportunities:   Cricket Chen identified the following strengths or resources that may help he succeed in counseling: commitment to health and well being, friends / good social support, family support and motivation. Things that may interfere with the patient's success include:  none noted at this time.    There are no language or communication issues or need for modification in treatment.   There are no ethnic, cultural or Christian factors that may be relevant for therapy.  Client identified their preferred language to be English.  Client does not need the assistance of an  or other support involved in therapy.    Suicide Risk Assessment:  Today Cricket Chen reports no suicidal ideation. Based on all available evidence including the factors cited above, Cricket Chen does not appear to be at imminent risk for self-harm, does not meet criteria for a 72-hr hold, and therefore remains appropriate for ongoing outpatient level of care.  A thorough assessment of risk factors  related to suicide and self-harm have been reviewed and are noted above. The patient convincingly denies acute suicidality on several occasions. Local community safety resources reviewed and printed for patient to use if needed. There was no deceit detected, and the patient presented in a manner that was believable.     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     Priority: High     Shortness of breath 04/15/2019     Priority: Medium     Added automatically from request for surgery [id number]       Other ill-defined heart diseases 04/15/2019     Priority: Medium     Added automatically from request for surgery [id number]       Chest pain 01/17/2019     Priority: Medium     Dermatitis, seborrheic 08/13/2018     Priority: Medium     Arm mass, right 07/25/2018     Priority: Medium     Postoperative hypothyroidism 04/04/2018     Priority: Medium     Atrial fibrillation (H) 03/07/2018     Priority: Medium     Hypertension secondary to other renal disorders 01/16/2018     Priority: Medium     Papillary thyroid carcinoma (H) 03/16/2017     Priority: Medium     Aftercare following organ transplant 01/03/2017     Priority: Medium     Chronic pain syndrome 07/12/2016     Priority: Medium     Pain management contract signed 07/12/2016     Priority: Medium     Signed contract for refills of Tramadol. He takes this for leg pain from cramping. Taking tramadol 50 mg tablets, 1 tablet up to BID. OK for 45 tablets per month.       Hypomagnesemia 07/09/2016     Priority: Medium     Secondary renal hyperparathyroidism (H) 07/09/2016     Priority: Medium     Immunosuppressed status (H) 05/19/2016     Priority: Medium     Liver replaced by transplant (H) 05/11/2016     Priority: Medium     Kidney replaced by  transplant 05/11/2016     Priority: Medium     [facility] 03/06/2015     Priority: Medium     For urgent after hours needs, please call [phone] and ask to speak with the [facility] Physician on call - mention job code [phone].         Pain medication agreement 02/18/2015     Priority: Medium     Thrombocytopenia (H) 10/20/2014     Priority: Medium     History of transposition of great vessels      Priority: Medium     8 months old       Depression      Priority: Medium     History of alcohol abuse 08/05/2014     Priority: Medium     Sober since summer 2014 per patient.       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    Patient has identified Specific Treatment Preferences: No   Specific limits to treatment preferences NOT identified: ASSUME FULL TREATMENT.       Alcohol dependence (H) 07/06/2012     Priority: Medium       Impression:  Cricket Chen is a 40 year old male with past history including anxiety and panic attacks who presents today for psychiatric evaluation. 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     Patient with likely panic disorder exacerbated by recent increase in psychosocial stressors.  Does not seem to meet criteria for generalized anxiety disorder and mood seems stable.  History of alcohol use disorder in sustained remission and denies cravings or any difficulties maintaining sobriety.  Given his significant comorbid medical conditions and seeming lack of need for a daily psychiatric medication, I would like to start Ativan to be used sparingly and as needed for panic attacks.  Patient was educated at length about the risks and benefits of using Ativan for his anxiety, particularly in the context of his history of alcohol abuse.  Patient seems to have good insight into his conditions and is committed to his sobriety.  He will be monitored closely for any misuse of the prescription or relapse on alcohol.  Literature would suggest that most people will not have relapse triggered by such use of Ativan as in this case.  We also discussed that if the patient takes Ativan for panic attack or very severe anxiety but continues to have symptoms he should be evaluated in the emergency room versus continuing to take more Ativan and potentially covering up a more severe medical condition/emergency.  But just knowing he has a rescue medication on hand may also decrease the frequency of his panic attacks and limit visits to the emergency room.  BuSpar has not seemed effective and so this will be discontinued in order to decrease risks of polypharmacy.     Medication side effects and alternatives reviewed. Health promotion activities recommended and reviewed today. All questions addressed. Education and counseling completed regarding risks and benefits of medications and psychotherapy options. Recommend therapy for additional support if symptoms persist or worsen.     Treatment  Plan:    Decrease BusPar to 15 mg twice daily for 1-2 weeks then 7.5 mg twice daily for 1-2 weeks and monitor for any worsening of your symptoms/anxiety. Then stop BusPar if no change while decreasing. Send a MyChart message if symptoms start to worsen. Or send a MyChart message after the taper and let us know that you are doing ok off the BusPar.       Discontinue hydroxyzine since ineffective.     Start lorazepam/Ativan 1 mg up to once daily as needed for severe anxiety/panic. Use sparingly. 15 tabs to last at least 30 days.     Continue to maintain sobriety from all mood altering substances.      Consider therapy if symptoms worsen.     Continue all other medications as reviewed per electronic medical record today.     Safety plan reviewed. To the Emergency Department as needed or call after hours crisis line at [phone] or [phone]. [place] Crisis Text Line: Text [place] to 876830  or  Suicide LifeLine Chat: suicidepreventionlifeline.org/chat    Schedule an appointment with me in 8 weeks or sooner as needed.  Call [facility] at [phone] to schedule.    Follow up with primary care provider as planned or sooner if needed for acute medical concerns.    Call the psychiatric nurse line with medication questions or concerns at [phone].    TXCOMhart may be used to communicate with your provider, but this is not intended to be used for emergencies.    Patient Education:  Risks of benzodiazepine (Ativan, Xanax, Klonopin, Valium, etc) use including, but not limited to, sedation, tolerance, risk for addiction/dependence. Do not drink alcohol while taking benzodiazepines due to risk of trouble breathing and potential death. Do not drive or operate heavy machinery until it is known how the drug affects you. Discuss with physician or pharmacist before ever taking a benzodiazepine with a narcotic/opioid pain medication.     Community Resources:    National Suicide Prevention Lifeline: 443.670.8071  (TTY: 423.602.5201). Call anytime for help.  (www.suicidepreventionlifeline.org)  [place] on Mental Illness (www.[place].org): 575.295.9599 or 324-421-8635.   Mental Health Association (www.mentalhealth.org): 359.692.4076 or 395-443-6016.  [place] Crisis Text Line: Text [place] to 932643  Suicide LifeLine Chat: suicideDeal [place].org/chat    Administrative Billing:   Phone Call/Video Duration: 38 Minutes  Start: 9:30am  Stop: 10:08am    Complexity of Care: Patient with multiple psychiatric diagnoses and multiple medication changes adding to complexity of care.    Patient Status:  Patient will continue to be seen for ongoing consultation and stabilization.    Signed:   Yoko Tyler DO  [facility]

## 2020-10-15 ENCOUNTER — VIRTUAL VISIT (OUTPATIENT)
Dept: PSYCHOLOGY | Facility: CLINIC | Age: 40
End: 2020-10-15
Payer: COMMERCIAL

## 2020-10-15 ENCOUNTER — VIRTUAL VISIT (OUTPATIENT)
Dept: PSYCHIATRY | Facility: OTHER | Age: 40
End: 2020-10-15
Attending: NURSE PRACTITIONER
Payer: COMMERCIAL

## 2020-10-15 DIAGNOSIS — F41.0 PANIC DISORDER: ICD-10-CM

## 2020-10-15 DIAGNOSIS — F41.1 GAD (GENERALIZED ANXIETY DISORDER): Primary | ICD-10-CM

## 2020-10-15 DIAGNOSIS — F41.9 ANXIETY: ICD-10-CM

## 2020-10-15 DIAGNOSIS — F10.21 ALCOHOL USE DISORDER, SEVERE, IN SUSTAINED REMISSION (H): ICD-10-CM

## 2020-10-15 DIAGNOSIS — F41.0 PANIC DISORDER WITHOUT AGORAPHOBIA: Primary | ICD-10-CM

## 2020-10-15 PROCEDURE — 99204 OFFICE O/P NEW MOD 45 MIN: CPT | Mod: 95 | Performed by: PSYCHIATRY & NEUROLOGY

## 2020-10-15 PROCEDURE — 90791 PSYCH DIAGNOSTIC EVALUATION: CPT | Mod: 52 | Performed by: PSYCHOLOGIST

## 2020-10-15 RX ORDER — LORAZEPAM 1 MG/1
1 TABLET ORAL DAILY PRN
Qty: 15 TABLET | Refills: 0 | Status: SHIPPED | OUTPATIENT
Start: 2020-10-15 | End: 2021-01-20

## 2020-10-15 SDOH — SOCIAL STABILITY: SOCIAL NETWORK: HOW OFTEN DO YOU GET TOGETHER WITH FRIENDS OR RELATIVES?: NOT ASKED

## 2020-10-15 SDOH — SOCIAL STABILITY: SOCIAL NETWORK: ARE YOU MARRIED, WIDOWED, DIVORCED, SEPARATED, NEVER MARRIED, OR LIVING WITH A PARTNER?: NEVER MARRIED

## 2020-10-15 SDOH — ECONOMIC STABILITY: FOOD INSECURITY: WITHIN THE PAST 12 MONTHS, THE FOOD YOU BOUGHT JUST DIDN'T LAST AND YOU DIDN'T HAVE MONEY TO GET MORE.: NEVER TRUE

## 2020-10-15 SDOH — SOCIAL STABILITY: SOCIAL NETWORK: HOW OFTEN DO YOU ATTENT MEETINGS OF THE CLUB OR ORGANIZATION YOU BELONG TO?: NOT ASKED

## 2020-10-15 SDOH — SOCIAL STABILITY: SOCIAL NETWORK: IN A TYPICAL WEEK, HOW MANY TIMES DO YOU TALK ON THE PHONE WITH FAMILY, FRIENDS, OR NEIGHBORS?: NOT ASKED

## 2020-10-15 SDOH — ECONOMIC STABILITY: TRANSPORTATION INSECURITY
IN THE PAST 12 MONTHS, HAS LACK OF TRANSPORTATION KEPT YOU FROM MEETINGS, WORK, OR FROM GETTING THINGS NEEDED FOR DAILY LIVING?: NO

## 2020-10-15 SDOH — SOCIAL STABILITY: SOCIAL NETWORK
DO YOU BELONG TO ANY CLUBS OR ORGANIZATIONS SUCH AS CHURCH GROUPS UNIONS, FRATERNAL OR ATHLETIC GROUPS, OR SCHOOL GROUPS?: NOT ASKED

## 2020-10-15 SDOH — HEALTH STABILITY: PHYSICAL HEALTH: ON AVERAGE, HOW MANY DAYS PER WEEK DO YOU ENGAGE IN MODERATE TO STRENUOUS EXERCISE (LIKE A BRISK WALK)?: NOT ASKED

## 2020-10-15 SDOH — SOCIAL STABILITY: SOCIAL INSECURITY
WITHIN THE LAST YEAR, HAVE TO BEEN RAPED OR FORCED TO HAVE ANY KIND OF SEXUAL ACTIVITY BY YOUR PARTNER OR EX-PARTNER?: NO

## 2020-10-15 SDOH — SOCIAL STABILITY: SOCIAL INSECURITY: WITHIN THE LAST YEAR, HAVE YOU BEEN HUMILIATED OR EMOTIONALLY ABUSED IN OTHER WAYS BY YOUR PARTNER OR EX-PARTNER?: NO

## 2020-10-15 SDOH — HEALTH STABILITY: MENTAL HEALTH
STRESS IS WHEN SOMEONE FEELS TENSE, NERVOUS, ANXIOUS, OR CAN'T SLEEP AT NIGHT BECAUSE THEIR MIND IS TROUBLED. HOW STRESSED ARE YOU?: ONLY A LITTLE

## 2020-10-15 SDOH — ECONOMIC STABILITY: INCOME INSECURITY: HOW HARD IS IT FOR YOU TO PAY FOR THE VERY BASICS LIKE FOOD, HOUSING, MEDICAL CARE, AND HEATING?: NOT HARD AT ALL

## 2020-10-15 SDOH — SOCIAL STABILITY: SOCIAL INSECURITY
WITHIN THE LAST YEAR, HAVE YOU BEEN KICKED, HIT, SLAPPED, OR OTHERWISE PHYSICALLY HURT BY YOUR PARTNER OR EX-PARTNER?: NO

## 2020-10-15 SDOH — ECONOMIC STABILITY: TRANSPORTATION INSECURITY
IN THE PAST 12 MONTHS, HAS THE LACK OF TRANSPORTATION KEPT YOU FROM MEDICAL APPOINTMENTS OR FROM GETTING MEDICATIONS?: NO

## 2020-10-15 SDOH — SOCIAL STABILITY: SOCIAL NETWORK: HOW OFTEN DO YOU ATTEND CHURCH OR RELIGIOUS SERVICES?: NOT ASKED

## 2020-10-15 SDOH — HEALTH STABILITY: PHYSICAL HEALTH: ON AVERAGE, HOW MANY MINUTES DO YOU ENGAGE IN EXERCISE AT THIS LEVEL?: NOT ASKED

## 2020-10-15 SDOH — ECONOMIC STABILITY: FOOD INSECURITY: WITHIN THE PAST 12 MONTHS, YOU WORRIED THAT YOUR FOOD WOULD RUN OUT BEFORE YOU GOT MONEY TO BUY MORE.: NEVER TRUE

## 2020-10-15 SDOH — SOCIAL STABILITY: SOCIAL INSECURITY: WITHIN THE LAST YEAR, HAVE YOU BEEN AFRAID OF YOUR PARTNER OR EX-PARTNER?: NO

## 2020-10-15 ASSESSMENT — ANXIETY QUESTIONNAIRES: GAD7 TOTAL SCORE: 5

## 2020-10-15 ASSESSMENT — PATIENT HEALTH QUESTIONNAIRE - PHQ9: SUM OF ALL RESPONSES TO PHQ QUESTIONS 1-9: 4

## 2020-10-15 NOTE — PROGRESS NOTES
"PATIENT'S NAME: Cricket Chen  PREFERRED NAME: Cricket  PREFERRED PRONOUNS: he/him/his  MRN:   [id number]  :   1980   ACCT. NUMBER: [id number]  DATE OF SERVICE: 10/15/20  START TIME: 08:45am  END TIME: 09:15am    BRIEF ADULT DIAGNOSTIC ASSESSMENT    Cricket Chen is a 40 year old male who is being evaluated via a telephone visit.      The patient has been notified of the following:     \"We have found that certain health care needs can be provided without the need for a face to face visit.  This service lets us provide the care you need with a short phone conversation.      I will have full access to your [facility] medical record during this entire phone call.   I will be taking notes for your medical record.     Since this is like an office visit, we will bill your insurance company for this service.  Please check with your medical insurance if this type of telephone visit/virtual care is covered.  You may be responsible for the cost of this service if insurance coverage is denied.      There are potential benefits and risks of telephone visits (e.g. limits to patient confidentiality) that differ from in-person visits.?  Confidentiality still applies for telephone services, and nobody will record the visit.  It is important to be in a quiet, private space that is free of distractions (including cell phone or other devices) during the visit.??     If during the course of the call I believe a telephone visit is not appropriate, you will not be charged for this service\"    Consent has been obtained for this service by care team member: yes.    As the provider I attest to compliance with applicable laws and regulations related to telemedicine.    Identifying Information:  Patient is a 40 year old, .  The pronoun use throughout this assessment reflects the patient's chosen pronoun.  Patient was referred for an assessment by DARYN Medeiros, CNP [facility].  Patient attended the session " "alone.     Chief Complaint:   The reason for seeking services at this time is: \"anxiety and panic attacks.\" The problem(s) began many years ago but was less of a problem when he was drinking alcohol heavily. Patient has not attempted to resolve these concerns in the past.    Does the client have any condition that is currently presenting as a potential to harm themselves or others (severe withdrawal, serious medical condition, severe emotional/behavioral problem)? No.  Proceed with assessment.    Review of Symptoms per patient report:  Depression: No symptoms  Trinh:  No Symptoms  Psychosis: No Symptoms  Anxiety: Excessive worry, Nervousness, Physical complaints, such as headaches, stomachaches, muscle tension, Sleep disturbance, Irritability and Anger outbursts  Panic:  Palpitations, Tremors, Shortness of breath, Tingling and Sense of impending doom Most of the time it is random; but sometimes if he feels something odd in chest/heart  Post Traumatic Stress Disorder:  No Symptoms   Eating Disorder: No Symptoms  ADD / ADHD:  No symptoms  Conduct Disorder: No symptoms  Autism Spectrum Disorder: No symptoms  Obsessive Compulsive Disorder: No Symptoms    Sleep: \"Not very good.\" Hydroxyzine and tried other meds for sleep. Hasn't helped. Has problems with sleep onset/maintenance.     Tobacco: none    Current alcohol use: none  Current drug use: none    Rating Scales:  PHQ-9:   [facility] Follow-up to PHQ 4/2/2020 10/7/2020 10/14/2020   PHQ-9 9. Suicide Ideation past 2 weeks Not at all Not at all Not at all      GAD7:    DEBBIE-7 SCORE 4/2/2020 10/7/2020 10/14/2020   Total Score - - 5 (mild anxiety)   Total Score 3 5 5       Personal Medical History:  Past Medical History:   Diagnosis Date     Alcohol abuse     Last drink in Mid-April 2014     Anemia in ESRD (end-stage renal disease) (H)      Anxiety 2008     Atrial flutter (H) 2017     Cirrhosis (H)     S/P liver transplant     Depression      History of blood transfusion      " History of transposition of great vessels     atrial switch at age 8 months old     Hypertension      Liver transplant recipient (H)     2016     Papillary thyroid carcinoma (H)      Pneumonia 11-15-14     Renal transplant recipient     2016     Varices, esophageal (H)        Patient has not received mental health services in the past: none.  Psychiatric Hospitalizations: None.  Patient denies a history of civil commitment. Currently, patient is not receiving other mental health services.  These include none.     Patient does not report a history of head injury / trauma / cognitive impairment / seizures.    Current Medications:  Current Outpatient Medications   Medication Sig Dispense Refill     busPIRone (BUSPAR) 15 MG tablet Take one tablet (15 mg) twice daily for anxiety. 180 tablet 1     busPIRone (BUSPAR) 5 MG tablet Take one tablet 5 mg by mouth (take with one 15 mg tablet for 20 mg total) in the morning. 30 tablet 3     hydrOXYzine (ATARAX) 50 MG tablet Take 1 tablet (50 mg) by mouth at bedtime as needed, may repeat once for anxiety 90 tablet 0     apixaban ANTICOAGULANT (ELIQUIS ANTICOAGULANT) 5 MG tablet Take 1 tablet (5 mg) by mouth 2 times daily 180 tablet 3     ketoconazole (NIZORAL) 2 % cream Twice daily to areas of rash on face 60 g 1     levothyroxine (SYNTHROID/LEVOTHROID) 137 MCG tablet Take 1 tablet by mouth Monday - Saturday  and 1.5 tablets on Sunday 96 tablet 3     lisinopril (ZESTRIL) 10 MG tablet TAKE ONE TABLET BY MOUTH ONCE DAILY 90 tablet 1     magnesium oxide (MAG-OX) 400 MG tablet TAKE ONE TABLET BY MOUTH TWICE A  tablet 11     metoprolol succinate ER (TOPROL-XL) 25 MG 24 hr tablet Take 1 tablet (25 mg) by mouth 2 times daily 180 tablet 3     mycophenolate (GENERIC EQUIVALENT) 250 MG capsule Take 2 capsules (500 mg) by mouth 2 times daily 120 capsule 11     sulfamethoxazole-trimethoprim (BACTRIM/SEPTRA) 400-80 MG tablet Take 1 tablet by mouth daily 30 tablet 11     tacrolimus  "(GENERIC EQUIVALENT) 0.5 MG capsule Take 1 capsule (0.5 mg) by mouth every morning Take with 1mg tabs, total of 1.5mg am, 1mg pm 90 capsule 3     tacrolimus (GENERIC EQUIVALENT) 1 MG capsule Take 1 capsule (1 mg) by mouth every 12 hours Take with .5 mg tabs, total of 1.5mg am, 1mg pm 60 capsule 11        Allergies:  Allergies   Allergen Reactions     Hydromorphone Itching       Family Psychiatric History:  Patient did report a family history of mental health concerns.     Family History     Problem (# of Occurrences) Relation (Name,Age of Onset)    Anxiety Disorder (2) Mother (Linsey), Sister (Linda)    Arthritis (1) Father    Hypertension (3) Father, Mother (Linsey), Sister (Linda)    No Known Problems (6) Brother, Maternal Grandmother, Maternal Grandfather, Paternal Grandmother, Brother, Paternal Grandfather       Negative family history of: Alcohol/Drug, Gastrointestinal Disease          Social/Family History:  Patient reported they grew up in [place], [place].  They were raised by biological parents. Patient reported that he/him/his childhood was \"good. No complaints.\" He has 1 older sister and 2 older brothers.  Patient denied experiencing childhood abuse/neglect. Patient described their current relationships with family of origin as \"good.\"      The patient has never  and has no children. Patient reported having few good friends.     Cultural influences and impact on patient's life structure, values, norms, and healthcare:  . Patient identified their preferred language to be English. Patient reported they does not need the assistance of an  or other support involved in treatment.       Educational/Occupational History:  Patient reported he/him/his highest education level was high school graduate. The patient did not serve in the . Patient is currently employed full time and reports they are able to function appropriately at work. Works for a 's education program.      Social " History     Socioeconomic History     Marital status: Single     Spouse name: Not on file     Number of children: 0     Years of education: Not on file     Highest education level: High school graduate   Occupational History     Employer: UNEMPLOYED   Social Needs     Financial resource strain: Not hard at all     Food insecurity     Worry: Never true     Inability: Never true     Transportation needs     Medical: No     Non-medical: No   Tobacco Use     Smoking status: Former Smoker     Packs/day: 0.33     Years: 3.00     Pack years: 0.99     Types: Cigarettes     Start date: 1997     Quit date: 2000     Years since quittin.0     Smokeless tobacco: Former User     Tobacco comment: Quit chewing in early    Substance and Sexual Activity     Alcohol use: No     Alcohol/week: 0.0 standard drinks     Comment: ~10 drinks per day for ten years, quit in 2014     Drug use: Not Currently     Types: Marijuana     Comment: in      Sexual activity: Not Currently     Partners: Female     Birth control/protection: None   Lifestyle     Physical activity     Days per week: Not on file     Minutes per session: Not on file     Stress: Only a little   Relationships     Social connections     Talks on phone: Not on file     Gets together: Not on file     Attends Scientologist service: Not on file     Active member of club or organization: Not on file     Attends meetings of clubs or organizations: Not on file     Relationship status: Never      Intimate partner violence     Fear of current or ex partner: No     Emotionally abused: No     Physically abused: No     Forced sexual activity: No   Other Topics Concern     Parent/sibling w/ CABG, MI or angioplasty before 65F 55M? Not Asked   Social History Narrative    ? Blood transfusion as baby during surgery,    Professional performed tattoos     No Illicit IV or intranasal drug use.        Patient reported that they have been involved with the legal  system.  DUI many years ago. Patient denies being on probation / parole / under the jurisdiction of the court.    Current Mental Status Exam:   Appearance:   Unable to assess over the phone    Eye Contact:   Unable to assess over the phone   Psychomotor:   Unable to assess over the phone   Attitude / Demeanor:  Cooperative   Speech      Rate / Production:  Normal/ Responsive      Volume:   Normal  volume      Language:   no problems  Mood:    Anxious   Affect:    Unable to assess over the phone    Thought Content:  Clear   Thought Process:  Logical       Associations:  No loosening of associations  Insight:    Good   Judgment:   Intact   Orientation:   All  Attention/concentration: Good      Safety Assessment:   Current Safety Concerns:  [place] Suicide Severity Rating Scale (Short Version)  [place] Suicide Severity Rating (Short Version) 1/16/2019 1/22/2019 10/30/2019 6/8/2020 8/18/2020 8/20/2020 10/2/2020   Over the past 2 weeks have you felt down, depressed, or hopeless? - - no no no no no   Over the past 2 weeks have you had thoughts of killing yourself? - - no no no no no   Have you ever attempted to kill yourself? - - no no no no no   Q1 Wished to be Dead (Past Month) no no no - - - -   Q2 Suicidal Thoughts (Past Month) no no no - - - -   Q6 Suicide Behavior (Lifetime) no no no - - - -     Patient denies current homicidal ideation and behaviors.  Patient denies current self-injurious ideation and behaviors.    Patient denied risk behaviors associated with substance use.  Patient denies any high risk behaviors associated with mental health symptoms.  Patient reports the following current concerns for their personal safety: None.  Patient reports there are no firearms in the house.  .     History of Safety Concerns:  Patient denied a history of homicidal ideation.     Patient denied a history of personal safety concerns.    Patient denied a history of assaultive behaviors.    Patient denied a history of sexual  assault behaviors.     Patient denied a history of risk behaviors associated with substance use.  Patient denies any history of high risk behaviors associated with mental health symptoms.  Patient reports the following protective factors: forward/future oriented thinking, dedication to family/friends and safe and stable environment    Risk Plan:  See Preliminary Treatment Plan for Safety and Risk Management Plan    Diagnosis:  Diagnostic Criteria:   A. Excessive anxiety and worry about a number of events or activities (such as work or school performance).   B. The person finds it difficult to control the worry.  C. Select 3 or more symptoms (required for diagnosis). Only one item is required in children.   - Restlessness or feeling keyed up or on edge.    - Being easily fatigued.    - Difficulty concentrating or mind going blank.    - Irritability.    - Muscle tension.    - Sleep disturbance (difficulty falling or staying asleep, or restless unsatisfying sleep).   D. The focus of the anxiety and worry is not confined to features of an Axis I disorder.  E. The anxiety, worry, or physical symptoms cause clinically significant distress or impairment in social, occupational, or other important areas of functioning.   F. The disturbance is not due to the direct physiological effects of a substance (e.g., a drug of abuse, a medication) or a general medical condition (e.g., hyperthyroidism) and does not occur exclusively during a Mood Disorder, a Psychotic Disorder, or a Pervasive Developmental Disorder.  1. Recurrent unexpected panic attacks and meets criteria 2, 3, and 4 (below)  2. At least one of the attacks has been followed by 1 month (or more) of one (or more) of the following:     (a) persistent concern about having additional attacks     (b) worry about the implications of the attack or its consequences     (c) a significant change in behavior related to the attacks  3. Absence of agoraphobia  4. The panic attacks  are not to the the direct physiological effects of a substance or general medical condition  5. The panic attacks are not better accounted for by another mental disorder, such as social phobia, specific phobia, OCD, PTSD, or separation anxiety disorder      Patient's Strengths and Limitations:  Patient identified the following strengths or resources that will help them succeed in treatment: friends / good social support, family support, strong social skills and work ethic. Things that may interfere with the patient's success in treatment include: physical health concerns.     Recommendations:     1. Plan for Safety and Risk Management:Recommended that patient call 911 or go to the local ED should there be a change in any of these risk factors..  Report to child / adult protection services was n/a.      2. Resources/Service Plan:       services are not indicated.     Modifications to assist communication are not indicated.     Additional disability accommodations are not indicated.      3. Collaboration:  Collaboration / coordination of treatment will be initiated with the following support professionals: psychiatry.      4.  Referrals:   The following referral(s) will be initiated: patient declined at this time.       Staff Name/Credentials:  Pantera Rizo PsyD, KATIE  October 15, 2020

## 2020-10-15 NOTE — Clinical Note
Please call this patient to get them scheduled for a follow-up visit in 7-8 weeks. Please schedule with me and the [facility]. Thanks!

## 2020-10-16 NOTE — PATIENT INSTRUCTIONS
Treatment Plan:    Decrease BusPar to 15 mg twice daily for 1-2 weeks then 7.5 mg twice daily for 1-2 weeks and monitor for any worsening of your symptoms/anxiety. Then stop BusPar if no change while decreasing. Send a MyChart message if symptoms start to worsen. Or send a MyChart message after the taper and let us know that you are doing ok off the BusPar.       Discontinue hydroxyzine since ineffective.     Start lorazepam/Ativan 1 mg up to once daily as needed for severe anxiety/panic. Use sparingly. 15 tabs to last at least 30 days.     Continue to maintain sobriety from all mood altering substances.      Consider therapy if symptoms worsen.     Continue all other medications as reviewed per electronic medical record today.     Safety plan reviewed. To the Emergency Department as needed or call after hours crisis line at [phone] or [phone]. [place] Crisis Text Line: Text [place] to 514458  or  Suicide LifeLine Chat: suicidepreventionlifeline.org/chat    Schedule an appointment with me in 8 weeks or sooner as needed.  Call [facility] at [phone] to schedule.    Follow up with primary care provider as planned or sooner if needed for acute medical concerns.    Call the psychiatric nurse line with medication questions or concerns at [phone].    [employer] may be used to communicate with your provider, but this is not intended to be used for emergencies.    Patient Education:  Risks of benzodiazepine (Ativan, Xanax, Klonopin, Valium, etc) use including, but not limited to, sedation, tolerance, risk for addiction/dependence. Do not drink alcohol while taking benzodiazepines due to risk of trouble breathing and potential death. Do not drive or operate heavy machinery until it is known how the drug affects you. Discuss with physician or pharmacist before ever taking a benzodiazepine with a narcotic/opioid pain medication.     Community Resources:    National Suicide Prevention Lifeline:  620.412.7203 (TTY: 110.881.3580). Call anytime for help.  (www.suicidepreventionlifeline.org)  National [place] on Mental Illness (www.[place].org): 721.942.4394 or 276-594-6007.   Mental Health Association (www.mentalhealth.org): 718.369.5747 or 170-392-2111.  [place] Crisis Text Line: Text [place] to 787421  Suicide LifeLine Chat: suicidepreLiberataline.org/chat

## 2020-11-02 ENCOUNTER — TELEPHONE (OUTPATIENT)
Dept: ENDOCRINOLOGY | Facility: CLINIC | Age: 40
End: 2020-11-02

## 2020-11-03 DIAGNOSIS — Z13.220 LIPID SCREENING: ICD-10-CM

## 2020-11-03 DIAGNOSIS — Z94.0 KIDNEY TRANSPLANTED: ICD-10-CM

## 2020-11-03 DIAGNOSIS — Z94.4 LIVER REPLACED BY TRANSPLANT (H): ICD-10-CM

## 2020-11-03 LAB
ALBUMIN SERPL-MCNC: 4.1 G/DL (ref 3.4–5)
ALP SERPL-CCNC: 61 U/L (ref 40–150)
ALT SERPL W P-5'-P-CCNC: 22 U/L (ref 0–70)
ANION GAP SERPL CALCULATED.3IONS-SCNC: 4 MMOL/L (ref 3–14)
AST SERPL W P-5'-P-CCNC: 18 U/L (ref 0–45)
BILIRUB DIRECT SERPL-MCNC: <0.1 MG/DL (ref 0–0.2)
BILIRUB SERPL-MCNC: 0.3 MG/DL (ref 0.2–1.3)
BUN SERPL-MCNC: 18 MG/DL (ref 7–30)
CALCIUM SERPL-MCNC: 9.1 MG/DL (ref 8.5–10.1)
CHLORIDE SERPL-SCNC: 111 MMOL/L (ref 94–109)
CO2 SERPL-SCNC: 28 MMOL/L (ref 20–32)
CREAT SERPL-MCNC: 1.32 MG/DL (ref 0.66–1.25)
ERYTHROCYTE [DISTWIDTH] IN BLOOD BY AUTOMATED COUNT: 13.7 % (ref 10–15)
GFR SERPL CREATININE-BSD FRML MDRD: 67 ML/MIN/{1.73_M2}
GLUCOSE SERPL-MCNC: 89 MG/DL (ref 70–99)
HCT VFR BLD AUTO: 47.6 % (ref 40–53)
HGB BLD-MCNC: 16.2 G/DL (ref 13.3–17.7)
MAGNESIUM SERPL-MCNC: 2.1 MG/DL (ref 1.6–2.3)
MCH RBC QN AUTO: 30.6 PG (ref 26.5–33)
MCHC RBC AUTO-ENTMCNC: 34 G/DL (ref 31.5–36.5)
MCV RBC AUTO: 90 FL (ref 78–100)
PLATELET # BLD AUTO: 147 10E9/L (ref 150–450)
POTASSIUM SERPL-SCNC: 4.3 MMOL/L (ref 3.4–5.3)
PROT SERPL-MCNC: 6.9 G/DL (ref 6.8–8.8)
PROT UR-MCNC: 0.2 G/L
PROT/CREAT 24H UR: 0.11 G/G CR (ref 0–0.2)
RBC # BLD AUTO: 5.3 10E12/L (ref 4.4–5.9)
SODIUM SERPL-SCNC: 143 MMOL/L (ref 133–144)
TACROLIMUS BLD-MCNC: 4.6 UG/L (ref 5–15)
TME LAST DOSE: ABNORMAL H
WBC # BLD AUTO: 5.9 10E9/L (ref 4–11)

## 2020-11-03 PROCEDURE — 80048 BASIC METABOLIC PNL TOTAL CA: CPT | Performed by: INTERNAL MEDICINE

## 2020-11-03 PROCEDURE — 85027 COMPLETE CBC AUTOMATED: CPT | Performed by: INTERNAL MEDICINE

## 2020-11-03 PROCEDURE — 36415 COLL VENOUS BLD VENIPUNCTURE: CPT | Performed by: INTERNAL MEDICINE

## 2020-11-03 PROCEDURE — 80076 HEPATIC FUNCTION PANEL: CPT | Performed by: INTERNAL MEDICINE

## 2020-11-03 PROCEDURE — 84156 ASSAY OF PROTEIN URINE: CPT | Performed by: INTERNAL MEDICINE

## 2020-11-03 PROCEDURE — 83735 ASSAY OF MAGNESIUM: CPT | Performed by: INTERNAL MEDICINE

## 2020-11-03 PROCEDURE — 80197 ASSAY OF TACROLIMUS: CPT | Performed by: INTERNAL MEDICINE

## 2020-11-06 ENCOUNTER — VIRTUAL VISIT (OUTPATIENT)
Dept: FAMILY MEDICINE | Facility: CLINIC | Age: 40
End: 2020-11-06
Payer: COMMERCIAL

## 2020-11-06 DIAGNOSIS — F41.0 PANIC ATTACK: ICD-10-CM

## 2020-11-06 DIAGNOSIS — F41.9 ANXIETY: Primary | ICD-10-CM

## 2020-11-06 PROCEDURE — 96127 BRIEF EMOTIONAL/BEHAV ASSMT: CPT | Performed by: NURSE PRACTITIONER

## 2020-11-06 PROCEDURE — 99213 OFFICE O/P EST LOW 20 MIN: CPT | Mod: 95 | Performed by: NURSE PRACTITIONER

## 2020-11-06 ASSESSMENT — ANXIETY QUESTIONNAIRES
7. FEELING AFRAID AS IF SOMETHING AWFUL MIGHT HAPPEN: NOT AT ALL
1. FEELING NERVOUS, ANXIOUS, OR ON EDGE: NOT AT ALL
GAD7 TOTAL SCORE: 3
5. BEING SO RESTLESS THAT IT IS HARD TO SIT STILL: NOT AT ALL
IF YOU CHECKED OFF ANY PROBLEMS ON THIS QUESTIONNAIRE, HOW DIFFICULT HAVE THESE PROBLEMS MADE IT FOR YOU TO DO YOUR WORK, TAKE CARE OF THINGS AT HOME, OR GET ALONG WITH OTHER PEOPLE: SOMEWHAT DIFFICULT
2. NOT BEING ABLE TO STOP OR CONTROL WORRYING: NOT AT ALL
3. WORRYING TOO MUCH ABOUT DIFFERENT THINGS: SEVERAL DAYS
6. BECOMING EASILY ANNOYED OR IRRITABLE: SEVERAL DAYS

## 2020-11-06 ASSESSMENT — PATIENT HEALTH QUESTIONNAIRE - PHQ9
SUM OF ALL RESPONSES TO PHQ QUESTIONS 1-9: 5
5. POOR APPETITE OR OVEREATING: SEVERAL DAYS

## 2020-11-06 NOTE — PROGRESS NOTES
"Cricket Chen is a 40 year old male who is being evaluated via a billable telephone visit.      The patient has been notified of following:     \"This telephone visit will be conducted via a call between you and your physician/provider. We have found that certain health care needs can be provided without the need for a physical exam.  This service lets us provide the care you need with a short phone conversation.  If a prescription is necessary we can send it directly to your pharmacy.  If lab work is needed we can place an order for that and you can then stop by our lab to have the test done at a later time.    Telephone visits are billed at different rates depending on your insurance coverage. During this emergency period, for some insurers they may be billed the same as an in-person visit.  Please reach out to your insurance provider with any questions.    If during the course of the call the physician/provider feels a telephone visit is not appropriate, you will not be charged for this service.\"    Patient has given verbal consent for Telephone visit?  Yes    What phone number would you like to be contacted at? [phone]    How would you like to obtain your AVS? Ajay Galeana     Cricket Chen is a 40 year old male who presents via phone visit today for the following health issues:    HPI     Anxiety Follow-Up    How are you doing with your anxiety since your last visit? Improved    Are you having other symptoms that might be associated with anxiety? No    Have you had a significant life event? No     Are you feeling depressed? No    Do you have any concerns with your use of alcohol or other drugs? No    Social History     Tobacco Use     Smoking status: Former Smoker     Packs/day: 1.00     Years: 3.00     Pack years: 3.00     Types: Cigarettes     Start date: 1997     Quit date: 2000     Years since quittin.1     Smokeless tobacco: Former User     Quit date: 9/10/2001     " Tobacco comment: Quit chewing in early 2000s   Substance Use Topics     Alcohol use: No     Alcohol/week: 0.0 standard drinks     Comment: ~10 drinks per day for ten years, quit in April of 2014     Drug use: No     Types: Marijuana     Comment: in HS     DEBBIE-7 SCORE 10/7/2020 10/14/2020 11/6/2020   Total Score - 5 (mild anxiety) -   Total Score 5 5 3     PHQ 10/7/2020 10/14/2020 11/6/2020   PHQ-9 Total Score 6 4 5   Q9: Thoughts of better off dead/self-harm past 2 weeks Not at all Not at all Not at all     Last PHQ-9 11/6/2020   1.  Little interest or pleasure in doing things 2   2.  Feeling down, depressed, or hopeless 1   3.  Trouble falling or staying asleep, or sleeping too much 2   4.  Feeling tired or having little energy 0   5.  Poor appetite or overeating 0   6.  Feeling bad about yourself 0   7.  Trouble concentrating 0   8.  Moving slowly or restless 0   Q9: Thoughts of better off dead/self-harm past 2 weeks 0   PHQ-9 Total Score 5   Difficulty at work, home, or with people Not difficult at all     DEBIBE-7  11/6/2020   1. Feeling nervous, anxious, or on edge 0   2. Not being able to stop or control worrying 0   3. Worrying too much about different things 1   4. Trouble relaxing 1   5. Being so restless that it is hard to sit still 0   6. Becoming easily annoyed or irritable 1   7. Feeling afraid, as if something awful might happen 0   DEBBIE-7 Total Score 3   If you checked any problems, how difficult have they made it for you to do your work, take care of things at home, or get along with other people? Somewhat difficult         How many servings of fruits and vegetables do you eat daily?  2-3    On average, how many sweetened beverages do you drink each day (Examples: soda, juice, sweet tea, etc.  Do NOT count diet or artificially sweetened beverages)?   0    How many days per week do you exercise enough to make your heart beat faster? 4    How many minutes a day do you exercise enough to make your heart  beat faster? 30 - 60    How many days per week do you miss taking your medication? 0    Was able to get into psychiatry 10/15/2020. Hydroxyzine stopped and buspar taper started due to lack of efficacy. He is on the last week of his buspar taper with no increase in symptoms. Started on lorazepam 1/2 tab PRN for panic attacks. Has only needed to take the lorazepam 1/2 tablet once every 1-2 weeks and it was helpful. States that just knowing he has something that helps with panic has been helpful. Follow up with psychiatry is scheduled.     Review of Systems   Constitutional, HEENT, cardiovascular, pulmonary, gi and gu systems are negative, except as otherwise noted.       Objective          Vitals:  No vitals were obtained today due to virtual visit.    healthy, alert and no distress  PSYCH: Alert and oriented times 3; coherent speech, normal   rate and volume, able to articulate logical thoughts, able   to abstract reason, no tangential thoughts, no hallucinations   or delusions  His affect is normal  RESP: No cough, no audible wheezing, able to talk in full sentences  Remainder of exam unable to be completed due to telephone visits    No results found. However, due to the size of the patient record, not all encounters were searched. Please check Results Review for a complete set of results.        Assessment/Plan:    Assessment & Plan     Anxiety  Panic attack  Well controlled with medications without side effects.  -Continue to follow with psychiatry.           Return in about 3 months (around 2/6/2021), or if symptoms worsen or fail to improve.    DARYN Guerra [facility]    Phone call duration:  5 minutes

## 2020-11-07 ASSESSMENT — ANXIETY QUESTIONNAIRES: GAD7 TOTAL SCORE: 3

## 2020-11-16 ENCOUNTER — VIRTUAL VISIT (OUTPATIENT)
Dept: PSYCHIATRY | Facility: OTHER | Age: 40
End: 2020-11-16
Payer: COMMERCIAL

## 2020-11-16 ENCOUNTER — VIRTUAL VISIT (OUTPATIENT)
Dept: PSYCHOLOGY | Facility: CLINIC | Age: 40
End: 2020-11-16
Payer: COMMERCIAL

## 2020-11-16 DIAGNOSIS — G47.00 INSOMNIA, UNSPECIFIED TYPE: ICD-10-CM

## 2020-11-16 DIAGNOSIS — F41.0 PANIC DISORDER: ICD-10-CM

## 2020-11-16 DIAGNOSIS — F41.0 PANIC DISORDER WITHOUT AGORAPHOBIA: Primary | ICD-10-CM

## 2020-11-16 DIAGNOSIS — F41.1 GAD (GENERALIZED ANXIETY DISORDER): Primary | ICD-10-CM

## 2020-11-16 DIAGNOSIS — F10.21 ALCOHOL USE DISORDER, SEVERE, IN SUSTAINED REMISSION (H): ICD-10-CM

## 2020-11-16 PROCEDURE — 90832 PSYTX W PT 30 MINUTES: CPT | Mod: 95 | Performed by: PSYCHOLOGIST

## 2020-11-16 PROCEDURE — 99214 OFFICE O/P EST MOD 30 MIN: CPT | Mod: 95 | Performed by: PSYCHIATRY & NEUROLOGY

## 2020-11-16 NOTE — PATIENT INSTRUCTIONS
"Treatment Plan:    Continue Ativan/lorazepam 1 mg daily as needed for severe anxiety/panic/sleep.  Continue to use sparingly.  Send a Luqit message when you are in need of a refill.    See if your sleep improves over the next 2-3 days.  His sleep continues to be very poor, restart BuSpar 5 mg in the evening daily to see if this is helpful.  If there is slight improvement in your sleep, but you are still struggling, return to BuSpar 5 mg twice daily dosing.    Work on mindfulness skills/techniques at bedtime.  Once such skill is called \"guided meditation.\"  There are multiple free videos/recordings online you can listen to to practice this skill.    Continue all other medications as reviewed per electronic medical record today.     Safety plan reviewed. To the Emergency Department as needed or call after hours crisis line at [phone] or [phone]. [place] Crisis Text Line. Text [place] to 022494 or Suicide LifeLine Chat: suicidepreWebNotesline.org/chat    Consider starting individual therapy if your symptoms continue to worsen or do not improve.    Schedule an appointment with me in 6 weeks or sooner as needed. Call [facility] at [phone] to schedule if someone does not reach out to you within 2-3 days.     Follow up with primary care provider as planned or for acute medical concerns.    Call the psychiatric nurse line with medication questions or concerns at [phone].    Luqit may be used to communicate with your provider, but this is not intended to be used for emergencies.    Risks of benzodiazepine (Ativan, Xanax, Klonopin, Valium, etc) use including, but not limited to, sedation, tolerance, risk for addiction/dependence. Do not drink alcohol while taking benzodiazepines due to risk of trouble breathing and potential death. Do not drive or operate heavy machinery until it is known how the drug affects you. Discuss with physician or pharmacist before ever taking a benzodiazepine " with a narcotic/opioid pain medication.

## 2020-11-16 NOTE — Clinical Note
Please call this patient to get them scheduled for a follow-up visit in 6 weeks. Please schedule with me and the [facility]. Thanks!

## 2020-11-16 NOTE — PROGRESS NOTES
"Cricket Chen is a 40 year old male who is being evaluated via a billable telephone visit.      The patient has been notified of following:     \"This telephone visit will be conducted via a call between you and your physician/provider. We have found that certain health care needs can be provided without the need for a physical exam.  This service lets us provide the care you need with a short phone conversation.  If a prescription is necessary we can send it directly to your pharmacy.  If lab work is needed we can place an order for that and you can then stop by our lab to have the test done at a later time.    Telephone visits are billed at different rates depending on your insurance coverage. During this emergency period, for some insurers they may be billed the same as an in-person visit.  Please reach out to your insurance provider with any questions.    If during the course of the call the physician/provider feels a telephone visit is not appropriate, you will not be charged for this service.\"    Patient has given verbal consent for Telephone visit?  Yes    What phone number would you like to be contacted at? See Epic    How would you like to obtain your AVS? MyChart        Outpatient Psychiatric Progress Note    Name: Cricket Chen   : 1980                    Primary Care Provider: Physician No Ref-Primary   Therapist: None    PHQ-9 scores:  PHQ-9 SCORE 10/7/2020 10/14/2020 2020   PHQ-9 Total Score MyChart - 4 (Minimal depression) -   PHQ-9 Total Score 6 4 5       DEBBIE-7 scores:  DEBBIE-7 SCORE 10/7/2020 10/14/2020 2020   Total Score - 5 (mild anxiety) -   Total Score 5 5 3       Patient Identification:  Patient is a 40 year old, single  White American male  who presents for return visit with me.  Patient is currently employed full time. Patient attended the phone/video session alone. Patient prefers to be called: \"Isidro\".    Interim History:  I last saw Cricket Chen for outpatient " "psychiatry Consultation on 10/15/2020. During that appointment, we:      Decrease BusPar to 15 mg twice daily for 1-2 weeks then 7.5 mg twice daily for 1-2 weeks and monitor for any worsening of your symptoms/anxiety. Then stop BusPar if no change while decreasing. Send a MyChart message if symptoms start to worsen. Or send a MyChart message after the taper and let us know that you are doing ok off the BusPar.       Discontinue hydroxyzine since ineffective.     Start lorazepam/Ativan 1 mg up to once daily as needed for severe anxiety/panic. Use sparingly. 15 tabs to last at least 30 days.     Continue to maintain sobriety from all mood altering substances.      Consider therapy if symptoms worsen.     11/16: Today, patient reports overall doing pretty good.  Slowly decreased BuSpar and has now been off the medication 4-5 days.  Was taking 5 mg twice daily when he discontinued the medication.  He does not necessarily feel like his daytime anxiety has worsened, but has had an increase amount of anxiety when trying to go to bed.  This has interfered with sleep significantly the past 2-3 days.  He reports he felt as though he was sleeping quite well before this trouble.  He is only taken Ativan a few times.  He did take an Ativan last night and it was very helpful for sleep.  He is continuing to work.  Has been able to stay healthy.  Denies any thoughts of suicide.  Mood stable.  No drug or alcohol use.    BusPar stopped 4-5 days ago. Was at 5 mg twice daily. No sleep issues until past two nights.  Took half an ativan last night. Helped with sleep.     Per [facility], Dr. Pantera Rizo, during today's team-based visit:  \"Things have been getting better.\" He still has some anxiety but less intense. He has not had any significant anxiety attacks recently. He noticed he has had some difficulty with sleep. Last night he laid in bed until 4:30am because of anxiety related chest tightness. \"I feel one palpitation and it starts worrying " "me and then it gets worse and I'm up all night.\" He has only taken his Ativan 3 times since his last appointment. He took half a tablet last night and said \"it kind of worked but took awhile.\" He denied any other side effects. Brief behavioral strategies to better manage anxiety/sleep were shared with him.    Psychiatric ROS:  Cricket MARLIN Chen reports mood has been: Okay/stable  Anxiety has been: Mostly manageable, see HPI above  Sleep has been: Worse the past 2 nights  Trinh sxs: None  Psychosis sxs: None  ADHD/ADD sxs: None  PTSD sxs: None  PHQ9 and GAD7 scores were reviewed today.   Medication side effects: Denies  Current stressors include: Symptoms, COVID-19  Coping mechanisms and supports include: Family, Hobbies and Friends    Current medications include:   Current Outpatient Medications   Medication Sig     apixaban ANTICOAGULANT (ELIQUIS ANTICOAGULANT) 5 MG tablet Take 1 tablet (5 mg) by mouth 2 times daily     ketoconazole (NIZORAL) 2 % cream Twice daily to areas of rash on face     levothyroxine (SYNTHROID/LEVOTHROID) 137 MCG tablet Take 1 tablet by mouth Monday - Saturday  and 1.5 tablets on Sunday     lisinopril (ZESTRIL) 10 MG tablet TAKE ONE TABLET BY MOUTH ONCE DAILY     LORazepam (ATIVAN) 1 MG tablet Take 1 tablet (1 mg) by mouth daily as needed (severe anxiety/panic/sleep)     magnesium oxide (MAG-OX) 400 MG tablet TAKE ONE TABLET BY MOUTH TWICE A DAY     metoprolol succinate ER (TOPROL-XL) 25 MG 24 hr tablet Take 1 tablet (25 mg) by mouth 2 times daily     mycophenolate (GENERIC EQUIVALENT) 250 MG capsule Take 2 capsules (500 mg) by mouth 2 times daily     sulfamethoxazole-trimethoprim (BACTRIM/SEPTRA) 400-80 MG tablet Take 1 tablet by mouth daily     tacrolimus (GENERIC EQUIVALENT) 0.5 MG capsule Take 1 capsule (0.5 mg) by mouth every morning Take with 1mg tabs, total of 1.5mg am, 1mg pm     tacrolimus (GENERIC EQUIVALENT) 1 MG capsule Take 1 capsule (1 mg) by mouth every 12 hours Take with .5 " mg tabs, total of 1.5mg am, 1mg pm     No current facility-administered medications for this visit.        The [place] Prescription Monitoring Program has been reviewed and there are no concerns about diversionary activity for controlled substances at this time.     Past Medical/Surgical History:  Past Medical History:   Diagnosis Date     Alcohol abuse     Last drink in Mid-April 2014     Anemia in ESRD (end-stage renal disease) (H)      Anxiety 2008     Atrial flutter (H) 2017     Cirrhosis (H)     S/P liver transplant     Depression      History of blood transfusion      History of transposition of great vessels     atrial switch at age 8 months old     Hypertension      Liver transplant recipient (H)     2016     Papillary thyroid carcinoma (H)      Pneumonia 11-15-14     Renal transplant recipient     2016     Varices, esophageal (H)       has a past medical history of Alcohol abuse, Anemia in ESRD (end-stage renal disease) (H), Anxiety (2008), Atrial flutter (H) (2017), Cirrhosis (H), Depression, History of blood transfusion, History of transposition of great vessels, Hypertension, Liver transplant recipient (H), Papillary thyroid carcinoma (H), Pneumonia (11-15-14), Renal transplant recipient, and Varices, esophageal (H). He also has no past medical history of Arthritis, Cerebral infarction (H), Congestive heart failure, unspecified, COPD (chronic obstructive pulmonary disease) (H), Coronary artery disease, CVA (cerebral infarction), Depressive disorder, Diabetes (H), or Uncomplicated asthma.    Social History:  Reviewed. No changes to social history.     Vital Signs:   None. This is phone/video visit.     Labs:  Most recent laboratory results reviewed and the pertinent results include:  Last Comprehensive Metabolic Panel:  Sodium   Date Value Ref Range Status   11/03/2020 143 133 - 144 mmol/L Final     Potassium   Date Value Ref Range Status   11/03/2020 4.3 3.4 - 5.3 mmol/L Final     Chloride   Date Value  Ref Range Status   11/03/2020 111 (H) 94 - 109 mmol/L Final     Carbon Dioxide   Date Value Ref Range Status   11/03/2020 28 20 - 32 mmol/L Final     Anion Gap   Date Value Ref Range Status   11/03/2020 4 3 - 14 mmol/L Final     Glucose   Date Value Ref Range Status   11/03/2020 89 70 - 99 mg/dL Final     Urea Nitrogen   Date Value Ref Range Status   11/03/2020 18 7 - 30 mg/dL Final     Creatinine   Date Value Ref Range Status   11/03/2020 1.32 (H) 0.66 - 1.25 mg/dL Final     GFR Estimate   Date Value Ref Range Status   11/03/2020 67 >60 mL/min/[1.73_m2] Final     Comment:     Non  GFR Calc  Starting 12/18/2018, serum creatinine based estimated GFR (eGFR) will be   calculated using the Chronic Kidney Disease Epidemiology Collaboration   (CKD-EPI) equation.       Calcium   Date Value Ref Range Status   11/03/2020 9.1 8.5 - 10.1 mg/dL Final     Lab Results   Component Value Date    AST 18 11/03/2020     Lab Results   Component Value Date    ALT 22 11/03/2020     Lab Results   Component Value Date    BILICONJ 2.4 07/15/2014      Lab Results   Component Value Date    BILITOTAL 0.3 11/03/2020     Lab Results   Component Value Date    ALBUMIN 4.1 11/03/2020     Lab Results   Component Value Date    PROTTOTAL 6.9 11/03/2020      Lab Results   Component Value Date    ALKPHOS 61 11/03/2020     Lab Results   Component Value Date    WBC 5.9 11/03/2020     Lab Results   Component Value Date    RBC 5.30 11/03/2020     Lab Results   Component Value Date    HGB 16.2 11/03/2020     Lab Results   Component Value Date    HCT 47.6 11/03/2020     No components found for: MCT  Lab Results   Component Value Date    MCV 90 11/03/2020     Lab Results   Component Value Date    MCH 30.6 11/03/2020     Lab Results   Component Value Date    MCHC 34.0 11/03/2020     Lab Results   Component Value Date    RDW 13.7 11/03/2020     Lab Results   Component Value Date     11/03/2020     Review of Systems:  10 systems (general,  cardiovascular, respiratory, eyes, ENT, endocrine, GI, , M/S, neurological) were reviewed. Most pertinent finding(s) is/are: denies fever, cough, headaches, shortness of breath, chest pain, N/V, constipation/diarrhea, trouble urinating, aches and pains. The remaining systems are all unremarkable.    Mental Status Examination (limited as this is by phone/video):  Attitude:  cooperative, pleasant  Oriented to:  person, place, time, and situation  Attention Span and Concentration:  normal  Speech:  clear, coherent, regular rate, rhythm, and volume  Language: intact  Mood:  good  Affect:  appropriate and in normal range and mood congruent  Associations:  no loose associations  Thought Process:  logical, linear and goal oriented  Thought Content:  no evidence of suicidal ideation or homicidal ideation, no evidence of psychotic thought, no auditory hallucinations present and no visual hallucinations present  Recent and Remote Memory:  Intact to interview. Not formally assessed. No amnesia.  Fund of Knowledge: appropriate  Insight:  good  Judgment:  intact, adequate for safety  Impulse Control:  intact    Suicide Risk Assessment:  Today Cricket Chen reports no suicidal ideation. Based on all available evidence including the factors cited above, Cricket Chen does not appear to be at imminent risk for self-harm, does not meet criteria for a 72-hr hold, and therefore remains appropriate for ongoing outpatient level of care.  A thorough assessment of risk factors related to suicide and self-harm have been reviewed and are noted above. The patient convincingly denies suicidality on several occasions. Local community safety resources printed and reviewed for patient to use if needed. There was no deceit detected, and the patient presented in a manner that was believable.     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     Priority: High     Shortness of breath 04/15/2019     Priority: Medium     Added automatically from request for surgery [id number]       Other ill-defined heart diseases 04/15/2019     Priority: Medium     Added automatically from request for surgery [id number]       Chest pain 01/17/2019     Priority: Medium     Dermatitis, seborrheic 08/13/2018     Priority: Medium     Arm mass, right 07/25/2018     Priority: Medium     Postoperative hypothyroidism 04/04/2018     Priority: Medium     Atrial fibrillation (H) 03/07/2018     Priority: Medium     Hypertension secondary to other renal disorders 01/16/2018     Priority: Medium     Papillary thyroid carcinoma (H) 03/16/2017     Priority: Medium     Aftercare following organ transplant 01/03/2017     Priority: Medium     Chronic pain syndrome 07/12/2016     Priority: Medium     Pain management contract signed 07/12/2016     Priority: Medium     Signed contract for refills of Tramadol. He takes this for leg pain from cramping. Taking tramadol 50 mg tablets, 1 tablet up to BID. OK for 45 tablets per month.       Hypomagnesemia 07/09/2016     Priority: Medium     Secondary renal hyperparathyroidism (H) 07/09/2016     Priority: Medium     Immunosuppressed status (H) 05/19/2016     Priority: Medium     Liver replaced by transplant (H) 05/11/2016     Priority: Medium     Kidney replaced by transplant 05/11/2016     Priority: Medium     [facility] 03/06/2015     Priority: Medium     For urgent after hours needs, please call [phone] and ask to speak with the [facility] Physician on call - mention job code [phone].         Pain medication agreement 02/18/2015     Priority: Medium     Thrombocytopenia (H) 10/20/2014     Priority: Medium     History of  transposition of great vessels      Priority: Medium     8 months old       Depression      Priority: Medium     History of alcohol abuse 08/05/2014     Priority: Medium     Sober since summer 2014 per patient.       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    Patient has identified Specific Treatment Preferences: No   Specific limits to treatment preferences NOT identified: ASSUME FULL TREATMENT.       Alcohol dependence (H) 07/06/2012     Priority: Medium       Psychosocial & Contextual Factors: see HPI    Assessment:  Cricket Chen reports overall stability, particularly in his daytime anxiety.  The BuSpar taper was going quite well until fairly recently.  He is experienced a significant sleep disruption in the past few days.  He is only been off of BuSpar for 4-5 days.  It seems as though he is getting anxious more easily when trying to fall asleep.  Discussed mindfulness techniques, including guided meditation that he can try for sleep.  Discussed giving it another 2-3 days without BuSpar to see if his symptoms slowly start to improve.  If not, we will need to restart BuSpar and see if this is helpful.  See treatment plan below.    Medication  "side effects and alternatives were reviewed. Health promotion activities recommended and reviewed today. All questions addressed. Education and counseling completed regarding risks and benefits of medications and psychotherapy options. Recommend therapy for additional support.     Treatment Plan:    Continue Ativan/lorazepam 1 mg daily as needed for severe anxiety/panic/sleep.  Continue to use sparingly.  Send a Novadiol message when you are in need of a refill.    See if your sleep improves over the next 2-3 days.  His sleep continues to be very poor, restart BuSpar 5 mg in the evening daily to see if this is helpful.  If there is slight improvement in your sleep, but you are still struggling, return to BuSpar 5 mg twice daily dosing.    Work on mindfulness skills/techniques at bedtime.  Once such skill is called \"guided meditation.\"  There are multiple free videos/recordings online you can listen to to practice this skill.    Continue all other medications as reviewed per electronic medical record today.     Safety plan reviewed. To the Emergency Department as needed or call after hours crisis line at [phone] or [phone]. [place] Crisis Text Line. Text [place] to 621430 or Suicide LifeLine Chat: suicidepreventionlifeline.org/chat    Consider starting individual therapy if your symptoms continue to worsen or do not improve.    Schedule an appointment with me in 6 weeks or sooner as needed. Call [facility] at [phone] to schedule if someone does not reach out to you within 2-3 days.     Follow up with primary care provider as planned or for acute medical concerns.    Call the psychiatric nurse line with medication questions or concerns at [phone].    Novadiol may be used to communicate with your provider, but this is not intended to be used for emergencies.    Risks of benzodiazepine (Ativan, Xanax, Klonopin, Valium, etc) use including, but not limited to, sedation, tolerance, risk for " addiction/dependence. Do not drink alcohol while taking benzodiazepines due to risk of trouble breathing and potential death. Do not drive or operate heavy machinery until it is known how the drug affects you. Discuss with physician or pharmacist before ever taking a benzodiazepine with a narcotic/opioid pain medication.     Administrative Billing:   Phone Call/Video Duration: 12 Minutes  Start: 11:50a  Stop: 12:02p    Time spent with patient was 12 minutes and greater than 50% of time or 7 minutes was spent in counseling and coordination of care regarding above diagnoses and treatment plan.  High complexity case due to complex medical history that contributes to mental health symptoms and medication decision making.  Also uncontrolled substance with history of substance use disorder requiring close monitoring.    Patient Status:  Patient will continue to be seen for ongoing consultation and stabilization.    Signed:   Yoko Tyler DO  [facility]

## 2020-11-16 NOTE — PROGRESS NOTES
"Collaborative Care Psychiatry Service (CCPS)  November 16, 2020      Behavioral Health Clinician Progress Note    Patient Name: Cricket Chen is a 40 year old male who is being evaluated via a telephone visit.      The patient has been notified of the following:     \"We have found that certain health care needs can be provided without the need for a face to face visit.  This service lets us provide the care you need with a short phone conversation.      I will have full access to your [place] medical record during this entire phone call.   I will be taking notes for your medical record.     Since this is like an office visit, we will bill your insurance company for this service.  Please check with your medical insurance if this type of telephone visit/virtual care is covered.  You may be responsible for the cost of this service if insurance coverage is denied.      There are potential benefits and risks of telephone visits (e.g. limits to patient confidentiality) that differ from in-person visits.?  Confidentiality still applies for telephone services, and nobody will record the visit.  It is important to be in a quiet, private space that is free of distractions (including cell phone or other devices) during the visit.??     If during the course of the call I believe a telephone visit is not appropriate, you will not be charged for this service\"    Consent has been obtained for this service by care team member: yes.      As the provider I attest to compliance with applicable laws and regulations related to telemedicine.         Service Type:  Individual      Service Location:   Phone call (patient / identified key support person reached)     Session Start Time: 11:15am  Session End Time: 11:35am      Session Length: 16 - 37      Attendees: Patient    Visit Activities (Refresh list every visit): [facility] Only    Diagnostic Assessment Date: 10/15/2020  See Flowsheets for today's PHQ-9 and DEBBIE-7 " "results  Previous PHQ-9:   PHQ-9 SCORE 10/7/2020 10/14/2020 11/6/2020   PHQ-9 Total Score MyChart - 4 (Minimal depression) -   PHQ-9 Total Score 6 4 5       Previous DEBBIE-7:   DEBBIE-7 SCORE 10/7/2020 10/14/2020 11/6/2020   Total Score - 5 (mild anxiety) -   Total Score 5 5 3       No flowsheet data found.     DATA  Extended Session (60+ minutes): No  Interactive Complexity: No  Crisis: No    Medication Compliance:  Yes      Chemical Use Review:   Substance Use: Chemical use reviewed, no active concerns identified      Tobacco Use: No current tobacco use.      Current Stressors / Issues:  \"Things have been getting better.\" He still has some anxiety but less intense. He has not had any significant anxiety attacks recently. He noticed he has had some difficulty with sleep. Last night he laid in bed until 4:30am because of anxiety related chest tightness. \"I feel one palpitation and it starts worrying me and then it gets worse and I'm up all night.\" He has only taken his Ativan 3 times since his last appointment. He took half a tablet last night and said \"it kind of worked but took awhile.\" He denied any other side effects. Brief behavioral strategies to better manage anxiety/sleep were shared with him.      Changes in Health Issues:   None reported    Assessment: Current Emotional / Mental Status (status of significant symptoms):  Risk status (Self / Other harm or suicidal ideation)  Patient denies a history of suicidal ideation, suicide attempts, self-injurious behavior, homicidal ideation, homicidal behavior and and other safety concerns  Patient denies current fears or concerns for personal safety.  Patient denies current or recent suicidal ideation or behaviors.  Patient denies current or recent homicidal ideation or behaviors.  Patient denies current or recent self injurious behavior or ideation.  Patient denies other safety concerns.  A safety and risk management plan has not been developed at this time, however " patient was encouraged to call [place] [phone] should there be a change in any of these risk factors.    Appearance:   Unable to assess over the phone   Eye Contact:   Unable to assess over the phone   Psychomotor Behavior: Unable to assess over the phone   Attitude:   Cooperative   Orientation:   All  Speech   Rate / Production: Normal    Volume:  Normal   Mood:    Normal  Affect:    Unable to assess over the phone   Thought Content:  Clear   Thought Form:  Coherent  Logical   Insight:    Good     Diagnoses:  1. DEBBIE (generalized anxiety disorder)    2. Panic disorder        Collateral Reports Completed:  Communicated with: Dr. Tyler    Plan: (Homework, other):  Patient was given information about behavioral services and encouraged to schedule a follow up appointment with the clinic [facility] in conjunction with next CCPS appointment.  He was also given information about mental health symptoms and treatment options .  CD Recommendations: No indications of CD issues.     Pantera Rizo PsyD, LP      Dejan Rizo PsyD  November 16, 2020

## 2020-11-20 ENCOUNTER — HOSPITAL ENCOUNTER (EMERGENCY)
Facility: CLINIC | Age: 40
Discharge: HOME | End: 2020-11-20
Attending: EMERGENCY MEDICINE | Admitting: EMERGENCY MEDICINE
Payer: COMMERCIAL

## 2020-11-20 VITALS
TEMPERATURE: 98.1 F | SYSTOLIC BLOOD PRESSURE: 143 MMHG | DIASTOLIC BLOOD PRESSURE: 93 MMHG | HEART RATE: 60 BPM | RESPIRATION RATE: 18 BRPM | OXYGEN SATURATION: 100 %

## 2020-11-20 DIAGNOSIS — R00.2 PALPITATIONS: ICD-10-CM

## 2020-11-20 LAB
ALBUMIN SERPL-MCNC: 4 G/DL (ref 3.4–5)
ALP SERPL-CCNC: 62 U/L (ref 40–150)
ALT SERPL W P-5'-P-CCNC: 42 U/L (ref 0–70)
ANION GAP SERPL CALCULATED.3IONS-SCNC: 8 MMOL/L (ref 3–14)
AST SERPL W P-5'-P-CCNC: 17 U/L (ref 0–45)
BASOPHILS # BLD AUTO: 0 10E9/L (ref 0–0.2)
BASOPHILS NFR BLD AUTO: 0.6 %
BILIRUB SERPL-MCNC: 0.4 MG/DL (ref 0.2–1.3)
BUN SERPL-MCNC: 15 MG/DL (ref 7–30)
CALCIUM SERPL-MCNC: 9 MG/DL (ref 8.5–10.1)
CHLORIDE SERPL-SCNC: 107 MMOL/L (ref 94–109)
CO2 SERPL-SCNC: 24 MMOL/L (ref 20–32)
CREAT SERPL-MCNC: 1.34 MG/DL (ref 0.66–1.25)
DIFFERENTIAL METHOD BLD: ABNORMAL
EOSINOPHIL # BLD AUTO: 0.1 10E9/L (ref 0–0.7)
EOSINOPHIL NFR BLD AUTO: 1.3 %
ERYTHROCYTE [DISTWIDTH] IN BLOOD BY AUTOMATED COUNT: 12.9 % (ref 10–15)
GFR SERPL CREATININE-BSD FRML MDRD: 66 ML/MIN/{1.73_M2}
GLUCOSE SERPL-MCNC: 148 MG/DL (ref 70–99)
HCT VFR BLD AUTO: 44.4 % (ref 40–53)
HGB BLD-MCNC: 14.8 G/DL (ref 13.3–17.7)
IMM GRANULOCYTES # BLD: 0 10E9/L (ref 0–0.4)
IMM GRANULOCYTES NFR BLD: 0.2 %
INTERPRETATION ECG - MUSE: NORMAL
LYMPHOCYTES # BLD AUTO: 1.2 10E9/L (ref 0.8–5.3)
LYMPHOCYTES NFR BLD AUTO: 22.6 %
MCH RBC QN AUTO: 30.1 PG (ref 26.5–33)
MCHC RBC AUTO-ENTMCNC: 33.3 G/DL (ref 31.5–36.5)
MCV RBC AUTO: 90 FL (ref 78–100)
MONOCYTES # BLD AUTO: 0.4 10E9/L (ref 0–1.3)
MONOCYTES NFR BLD AUTO: 6.6 %
NEUTROPHILS # BLD AUTO: 3.6 10E9/L (ref 1.6–8.3)
NEUTROPHILS NFR BLD AUTO: 68.7 %
NRBC # BLD AUTO: 0 10*3/UL
NRBC BLD AUTO-RTO: 0 /100
PLATELET # BLD AUTO: 144 10E9/L (ref 150–450)
POTASSIUM SERPL-SCNC: 3.7 MMOL/L (ref 3.4–5.3)
PROT SERPL-MCNC: 7.1 G/DL (ref 6.8–8.8)
RBC # BLD AUTO: 4.91 10E12/L (ref 4.4–5.9)
SODIUM SERPL-SCNC: 139 MMOL/L (ref 133–144)
TROPONIN I SERPL-MCNC: <0.015 UG/L (ref 0–0.04)
WBC # BLD AUTO: 5.3 10E9/L (ref 4–11)

## 2020-11-20 PROCEDURE — 93010 ELECTROCARDIOGRAM REPORT: CPT | Performed by: EMERGENCY MEDICINE

## 2020-11-20 PROCEDURE — 85025 COMPLETE CBC W/AUTO DIFF WBC: CPT | Performed by: EMERGENCY MEDICINE

## 2020-11-20 PROCEDURE — 99284 EMERGENCY DEPT VISIT MOD MDM: CPT | Performed by: EMERGENCY MEDICINE

## 2020-11-20 PROCEDURE — 84484 ASSAY OF TROPONIN QUANT: CPT | Performed by: EMERGENCY MEDICINE

## 2020-11-20 PROCEDURE — 99284 EMERGENCY DEPT VISIT MOD MDM: CPT | Mod: 25 | Performed by: EMERGENCY MEDICINE

## 2020-11-20 PROCEDURE — 93005 ELECTROCARDIOGRAM TRACING: CPT | Performed by: EMERGENCY MEDICINE

## 2020-11-20 PROCEDURE — 80053 COMPREHEN METABOLIC PANEL: CPT | Performed by: EMERGENCY MEDICINE

## 2020-11-20 NOTE — ED TRIAGE NOTES
"Patient awake and alert. Respirations even and unlabored. Skin warm and dry. Palpitations started Wednesday evening. Prescribed Ativan for anxiety and sleep, and due to palpitations related to anxiety. Self administered PO Ativan last evening with no relief. Woke up this AM with no palpations. Palpitations started at 1800 yesterday and increased in severity at 0130 today. History of transposition of great vessels, alcoholic hepatitis s/p liver and kidney transplant (5/2016), atrial flutter s/p cardioversion. Denies fever, abdominal pain, nausea, or diarrhea.  Dry cough for \"couple days\". Denies recent alcohol or drug use.   "

## 2020-11-20 NOTE — DISCHARGE INSTRUCTIONS
Please follow-up with your electrophysiologist and your cardiologist.  Follow-up with your primary care provider.  Return to the emergency department as needed for any new or worsening symptoms.

## 2020-11-20 NOTE — ED AVS SNAPSHOT
[facility] Emergency Department  [address] [address] [facility] [facility] [address]  Phone: [phone]                                    Cricket Chen   MRN: [id number]    Department: [facility] Emergency Department   Date of Visit: 11/20/2020           After Visit Summary Signature Page    I have received my discharge instructions, and my questions have been answered. I have discussed any challenges I see with this plan with the nurse or doctor.    ..........................................................................................................................................  Patient/Patient Representative Signature      ..........................................................................................................................................  Patient Representative Print Name and Relationship to Patient    ..................................................               ................................................  Date                                   Time    ..........................................................................................................................................  Reviewed by Signature/Title    ...................................................              ..............................................  Date                                               Time          22EPIC Rev 08/18

## 2020-11-20 NOTE — ED PROVIDER NOTES
History     Chief Complaint   Patient presents with     Palpitations     HPI  Cricket Chen is a 40 year old male who presents to us with a chief complaint of palpitations.  He states that he has been having sensations of skipping beats and intermittently racing heart over the last day or 2.  He has Ativan that he has taken in the past for this with some success in controlling the symptoms.  Denies any chest pain or shortness of breath.  No fevers or chills.  No coughing.  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  He also has had a liver transplant.    I have reviewed the Medications, Allergies, Past Medical and Surgical History, and Social History in the [email or web address] system.  PAST MEDICAL HISTORY:   Past Medical History:   Diagnosis Date     Alcohol abuse     Last drink in Mid-April 2014     Anemia in ESRD (end-stage renal disease) (H)      Anxiety 2008     Atrial flutter (H) 2017     Cirrhosis (H)     S/P liver transplant     Depression      History of blood transfusion      History of transposition of great vessels     atrial switch at age 8 months old     Hypertension      Liver transplant recipient (H)     2016     Papillary thyroid carcinoma (H)      Pneumonia 11-15-14     Renal transplant recipient     2016     Varices, esophageal (H)        PAST SURGICAL HISTORY:   Past Surgical History:   Procedure Laterality Date     ANESTHESIA CARDIOVERSION N/A 3/7/2018    Procedure: ANESTHESIA CARDIOVERSION;  Cardioversion;  Surgeon: GENERIC ANESTHESIA PROVIDER;  Location:  OR     BENCH LIVER N/A 5/10/2016    Procedure: BENCH LIVER;  Surgeon: Ricky Deshpande MD;  Location: [facility] OR     BIOPSY LYMPH NODE CERVICAL Right 1/26/2017    Procedure: BIOPSY LYMPH NODE CERVICAL;  Surgeon: Beka Soto MD;  Location:  OR     CARDIAC SURGERY  9-10-80     CREATE FISTULA ARTERIOVENOUS UPPER EXTREMITY Right 9/16/2014     Procedure: CREATE FISTULA ARTERIOVENOUS UPPER EXTREMITY;  Surgeon: Padmaja Eaton MD;  [facility]: [facility] OR     CV RIGHT HEART CATH N/A 4/19/2019    Procedure: CV RIGHT HEART CATH;  Surgeon: Sabi Wiggins MD;  Location:  HEART CARDIAC CATH LAB     CYSTOSCOPY, REMOVE STENT(S), COMBINED Right 6/22/2016    Procedure: COMBINED CYSTOSCOPY, REMOVE STENT(S);  Surgeon: Ricky Deshpande MD;  [facility]: [facility] OR     EMBOLECTOMY UPPER EXTREMITY Right 8/17/2018    Procedure: EMBOLECTOMY UPPER EXTREMITY;  Repair Right Upper Arm Pseudo Aneursym ;  Surgeon: John Payton MD;  Location: [facility] OR     ENT SURGERY       ESOPHAGOSCOPY, GASTROSCOPY, DUODENOSCOPY (EGD), COMBINED  5/30/2014    Procedure: COMBINED ESOPHAGOSCOPY, GASTROSCOPY, DUODENOSCOPY (EGD);  Surgeon: Guillaume Bautista MD;  Location:  GI     ESOPHAGOSCOPY, GASTROSCOPY, DUODENOSCOPY (EGD), COMBINED  9/30/14     ESOPHAGOSCOPY, GASTROSCOPY, DUODENOSCOPY (EGD), COMBINED Left 3/12/2015    Procedure: COMBINED ESOPHAGOSCOPY, GASTROSCOPY, DUODENOSCOPY (EGD);  Surgeon: Laureen Galvan MD;  Location:  GI     ESOPHAGOSCOPY, GASTROSCOPY, DUODENOSCOPY (EGD), COMBINED N/A 4/21/2016    Procedure: COMBINED ESOPHAGOSCOPY, GASTROSCOPY, DUODENOSCOPY (EGD);  Surgeon: Laureen Galvan MD;  Location:  GI     ESOPHAGOSCOPY, GASTROSCOPY, DUODENOSCOPY (EGD), COMBINED N/A 7/21/2016    Procedure: COMBINED ESOPHAGOSCOPY, GASTROSCOPY, DUODENOSCOPY (EGD);  Surgeon: Laureen Galvan MD;  Location:  GI     HC OR CATH ABLATION NON-CARDIAC ENDOVASCULAR  1990,2002    SVT     INSERT PORT VASCULAR ACCESS N/A 4/3/2017    Procedure: INSERT PORT VASCULAR ACCESS;  Surgeon: Rajendra Jacques PA-C;  [facility] OR     IR PORT REMOVAL LEFT  1/22/2019     LIGATE FISTULA ARTERIOVENOUS UPPER EXTREMITY Right 11/7/2017    Procedure: LIGATE FISTULA ARTERIOVENOUS UPPER EXTREMITY;  Revision of Right Arm Arteriovenous Fistula ;  Surgeon: Padmaja Eaton MD;   [facility] OR     PERCUTANEOUS BIOPSY KIDNEY Right 2018    Procedure: PERCUTANEOUS BIOPSY KIDNEY;  Right Kidney Biopsy;  Surgeon: Yuval Khan MD;  [facility]     PERCUTANEOUS BIOPSY KIDNEY Right 10/30/2019    Procedure: Right Side Kidney Biopsy;  Surgeon: Jake Sidhu MD;  [facility]     PICC INSERTION  14    PICC line placement 14; Removal 2014     REMOVE PORT VASCULAR ACCESS Right 2019    Procedure: Right chest Port Removal;  Surgeon: Erasmo Ziegler PA-C;  [facility] OR     THORACIC SURGERY  1980    Transposition great arteries, repaired at 8 months     THYROIDECTOMY Right 2017    Procedure: THYROIDECTOMY;  Bilateral Total Thyroidectomy ;  Surgeon: Beka Soto MD;  [facility]: [facility] OR     TRANSPLANT KIDNEY RECIPIENT  DONOR  5/10/2016    Procedure: TRANSPLANT KIDNEY RECIPIENT  DONOR;  Surgeon: Ricky Deshpande MD;  [facility]     TRANSPLANT LIVER RECIPIENT  DONOR N/A 5/10/2016    Procedure: TRANSPLANT LIVER RECIPIENT  DONOR;  Surgeon: Ricky Deshpande MD;  [facility] OR       Past medical history, past surgical history, medications, and allergies were reviewed with the patient. Additional pertinent items: None    FAMILY HISTORY:   Family History   Problem Relation Age of Onset     No Known Problems Brother      Arthritis Father      Hypertension Father      Hypertension Mother      Anxiety Disorder Mother      Hyperlipidemia Mother      Hypertension Sister      Anxiety Disorder Sister      No Known Problems Maternal Grandmother      No Known Problems Maternal Grandfather      No Known Problems Paternal Grandmother      No Known Problems Brother      No Known Problems Paternal Grandfather      Alcohol/Drug No family hx of      Gastrointestinal Disease No family hx of         no fam hx of liver disease or liver cancer       SOCIAL HISTORY:   Social History     Tobacco Use     Smoking status: Former  Smoker     Packs/day: 1.00     Years: 3.00     Pack years: 3.00     Types: Cigarettes     Start date: 1997     Quit date: 2000     Years since quittin.1     Smokeless tobacco: Former User     Quit date: 9/10/2001     Tobacco comment: Quit chewing in early    Substance Use Topics     Alcohol use: No     Alcohol/week: 0.0 standard drinks     Comment: ~10 drinks per day for ten years, quit in 2014     Social history was reviewed with the patient. Additional pertinent items: None      Patient's Medications   New Prescriptions    No medications on file   Previous Medications    APIXABAN ANTICOAGULANT (ELIQUIS ANTICOAGULANT) 5 MG TABLET    Take 1 tablet (5 mg) by mouth 2 times daily    KETOCONAZOLE (NIZORAL) 2 % CREAM    Twice daily to areas of rash on face    LEVOTHYROXINE (SYNTHROID/LEVOTHROID) 137 MCG TABLET    Take 1 tablet by mouth Monday - Saturday  and 1.5 tablets on     LISINOPRIL (ZESTRIL) 10 MG TABLET    TAKE ONE TABLET BY MOUTH ONCE DAILY    LORAZEPAM (ATIVAN) 1 MG TABLET    Take 1 tablet (1 mg) by mouth daily as needed (severe anxiety/panic/sleep)    MAGNESIUM OXIDE (MAG-OX) 400 MG TABLET    TAKE ONE TABLET BY MOUTH TWICE A DAY    METOPROLOL SUCCINATE ER (TOPROL-XL) 25 MG 24 HR TABLET    Take 1 tablet (25 mg) by mouth 2 times daily    MYCOPHENOLATE (GENERIC EQUIVALENT) 250 MG CAPSULE    Take 2 capsules (500 mg) by mouth 2 times daily    SULFAMETHOXAZOLE-TRIMETHOPRIM (BACTRIM/SEPTRA) 400-80 MG TABLET    Take 1 tablet by mouth daily    TACROLIMUS (GENERIC EQUIVALENT) 0.5 MG CAPSULE    Take 1 capsule (0.5 mg) by mouth every morning Take with 1mg tabs, total of 1.5mg am, 1mg pm    TACROLIMUS (GENERIC EQUIVALENT) 1 MG CAPSULE    Take 1 capsule (1 mg) by mouth every 12 hours Take with .5 mg tabs, total of 1.5mg am, 1mg pm   Modified Medications    No medications on file   Discontinued Medications    No medications on file          Allergies   Allergen Reactions     Hydromorphone  Itching        Review of Systems  A complete review of systems was performed with pertinent positives and negatives noted in the HPI, and all other systems negative.    Physical Exam   BP: (!) 143/93  Pulse: 60  Temp: 98.1  F (36.7  C)  Resp: 18  SpO2: 100 %      Physical Exam  Constitutional:       General: He is not in acute distress.     Appearance: He is not diaphoretic.   HENT:      Head: Normocephalic.      Mouth/Throat:      Pharynx: No oropharyngeal exudate.   Eyes:      Extraocular Movements: Extraocular movements intact.   Neck:      Musculoskeletal: Neck supple.   Cardiovascular:      Heart sounds: Normal heart sounds.   Pulmonary:      Effort: No respiratory distress.      Breath sounds: Normal breath sounds.   Abdominal:      General: There is no distension.      Palpations: Abdomen is soft.      Tenderness: There is no abdominal tenderness.   Musculoskeletal:         General: No deformity.   Skin:     General: Skin is dry.   Neurological:      Mental Status: He is alert.      Comments: alert   Psychiatric:         Behavior: Behavior normal.         ED Course        Procedures             EKG Interpretation:      Interpreted by Sridhar Gayle DO  Time reviewed: 320  Symptoms at time of EKG: Palpitations  Rhythm: normal sinus   Rate: normal  Axis: normal  Ectopy: none  Conduction: normal  ST Segments/ T Waves: Right bundle branch block, ST depression, T wave inversions Q Waves: none  Comparison to prior: Unchanged    Clinical Impression: Abnormal EKG             Results for orders placed or performed during the hospital encounter of 11/20/20 (from the past 24 hour(s))   CBC with platelets differential   Result Value Ref Range    WBC 5.3 4.0 - 11.0 10e9/L    RBC Count 4.91 4.4 - 5.9 10e12/L    Hemoglobin 14.8 13.3 - 17.7 g/dL    Hematocrit 44.4 40.0 - 53.0 %    MCV 90 78 - 100 fl    MCH 30.1 26.5 - 33.0 pg    MCHC 33.3 31.5 - 36.5 g/dL    RDW 12.9 10.0 - 15.0 %    Platelet Count 144 (L) 150 - 450  10e9/L    Diff Method Automated Method     % Neutrophils 68.7 %    % Lymphocytes 22.6 %    % Monocytes 6.6 %    % Eosinophils 1.3 %    % Basophils 0.6 %    % Immature Granulocytes 0.2 %    Nucleated RBCs 0 0 /100    Absolute Neutrophil 3.6 1.6 - 8.3 10e9/L    Absolute Lymphocytes 1.2 0.8 - 5.3 10e9/L    Absolute Monocytes 0.4 0.0 - 1.3 10e9/L    Absolute Eosinophils 0.1 0.0 - 0.7 10e9/L    Absolute Basophils 0.0 0.0 - 0.2 10e9/L    Abs Immature Granulocytes 0.0 0 - 0.4 10e9/L    Absolute Nucleated RBC 0.0    Comprehensive metabolic panel   Result Value Ref Range    Sodium 139 133 - 144 mmol/L    Potassium 3.7 3.4 - 5.3 mmol/L    Chloride 107 94 - 109 mmol/L    Carbon Dioxide 24 20 - 32 mmol/L    Anion Gap 8 3 - 14 mmol/L    Glucose 148 (H) 70 - 99 mg/dL    Urea Nitrogen 15 7 - 30 mg/dL    Creatinine 1.34 (H) 0.66 - 1.25 mg/dL    GFR Estimate 66 >60 mL/min/[1.73_m2]    GFR Estimate If Black 76 >60 mL/min/[1.73_m2]    Calcium 9.0 8.5 - 10.1 mg/dL    Bilirubin Total 0.4 0.2 - 1.3 mg/dL    Albumin 4.0 3.4 - 5.0 g/dL    Protein Total 7.1 6.8 - 8.8 g/dL    Alkaline Phosphatase 62 40 - 150 U/L    ALT 42 0 - 70 U/L    AST 17 0 - 45 U/L   Troponin I   Result Value Ref Range    Troponin I ES <0.015 0.000 - 0.045 ug/L     *Note: Due to a large number of results and/or encounters for the requested time period, some results have not been displayed. A complete set of results can be found in Results Review.     Medications - No data to display          Assessments & Plan (with Medical Decision Making)   40-year-old male presents to us with a chief complaint of palpitations.  EKG is unchanged from his prior.  Vital signs are unremarkable.  Labs are normal.  Discussed with cardiology.  They do not have any recommendations on changing medications at this time.  They stated EP would be in when the next several hours and they could consult directly otherwise the patient could follow-up with her own primary care team for further  assessment of palpitations.  Patient was given the option of waiting for a EP consultation versus discharge.  He elected to be discharged and will follow up with his own physicians.    I have reviewed the nursing notes.    I have reviewed the findings, diagnosis, plan and need for follow up with the patient.    New Prescriptions    No medications on file       Final diagnoses:   Palpitations       11/20/2020   [facility] EMERGENCY DEPARTMENT     Sridahr Gayle,   11/20/20 0551

## 2020-11-24 ENCOUNTER — CARE COORDINATION (OUTPATIENT)
Dept: CARDIOLOGY | Facility: CLINIC | Age: 40
End: 2020-11-24

## 2020-11-24 DIAGNOSIS — R00.2 PALPITATIONS: Primary | ICD-10-CM

## 2020-11-24 NOTE — PROGRESS NOTES
Date: 11/24/2020    Time of Call: 5:09 PM     Diagnosis:  Palpitations      [ TORB ] Ordering provider: DARYN Rios  Order: Repeat 14 day [employer] monitor     Order received by: Gagandeep Corado RN     Follow-up/additional notes: Sent patient My Chart message with recommendations.

## 2020-11-27 ENCOUNTER — TELEPHONE (OUTPATIENT)
Dept: PEDIATRIC CARDIOLOGY | Facility: CLINIC | Age: 40
End: 2020-11-27

## 2020-12-04 ENCOUNTER — ALLIED HEALTH/NURSE VISIT (OUTPATIENT)
Dept: CARDIOLOGY | Facility: CLINIC | Age: 40
End: 2020-12-04
Attending: NURSE PRACTITIONER
Payer: COMMERCIAL

## 2020-12-04 DIAGNOSIS — R00.2 PALPITATIONS: ICD-10-CM

## 2020-12-08 DIAGNOSIS — Z94.4 LIVER REPLACED BY TRANSPLANT (H): ICD-10-CM

## 2020-12-08 DIAGNOSIS — Z13.220 LIPID SCREENING: ICD-10-CM

## 2020-12-08 LAB
ALBUMIN SERPL-MCNC: 4.5 G/DL (ref 3.4–5)
ALP SERPL-CCNC: 77 U/L (ref 40–150)
ALT SERPL W P-5'-P-CCNC: 41 U/L (ref 0–70)
ANION GAP SERPL CALCULATED.3IONS-SCNC: 2 MMOL/L (ref 3–14)
AST SERPL W P-5'-P-CCNC: 23 U/L (ref 0–45)
BILIRUB DIRECT SERPL-MCNC: 0.1 MG/DL (ref 0–0.2)
BILIRUB SERPL-MCNC: 0.6 MG/DL (ref 0.2–1.3)
BUN SERPL-MCNC: 17 MG/DL (ref 7–30)
CALCIUM SERPL-MCNC: 9.5 MG/DL (ref 8.5–10.1)
CHLORIDE SERPL-SCNC: 107 MMOL/L (ref 94–109)
CO2 SERPL-SCNC: 30 MMOL/L (ref 20–32)
CREAT SERPL-MCNC: 1.29 MG/DL (ref 0.66–1.25)
ERYTHROCYTE [DISTWIDTH] IN BLOOD BY AUTOMATED COUNT: 13.2 % (ref 10–15)
GFR SERPL CREATININE-BSD FRML MDRD: 69 ML/MIN/{1.73_M2}
GLUCOSE SERPL-MCNC: 92 MG/DL (ref 70–99)
HCT VFR BLD AUTO: 50.3 % (ref 40–53)
HGB BLD-MCNC: 16.9 G/DL (ref 13.3–17.7)
MAGNESIUM SERPL-MCNC: 2 MG/DL (ref 1.6–2.3)
MCH RBC QN AUTO: 30 PG (ref 26.5–33)
MCHC RBC AUTO-ENTMCNC: 33.6 G/DL (ref 31.5–36.5)
MCV RBC AUTO: 89 FL (ref 78–100)
PLATELET # BLD AUTO: 145 10E9/L (ref 150–450)
POTASSIUM SERPL-SCNC: 4.1 MMOL/L (ref 3.4–5.3)
PROT SERPL-MCNC: 7.7 G/DL (ref 6.8–8.8)
RBC # BLD AUTO: 5.63 10E12/L (ref 4.4–5.9)
SODIUM SERPL-SCNC: 139 MMOL/L (ref 133–144)
WBC # BLD AUTO: 5.8 10E9/L (ref 4–11)

## 2020-12-08 PROCEDURE — 85027 COMPLETE CBC AUTOMATED: CPT | Performed by: INTERNAL MEDICINE

## 2020-12-08 PROCEDURE — 36415 COLL VENOUS BLD VENIPUNCTURE: CPT | Performed by: INTERNAL MEDICINE

## 2020-12-08 PROCEDURE — 80048 BASIC METABOLIC PNL TOTAL CA: CPT | Performed by: INTERNAL MEDICINE

## 2020-12-08 PROCEDURE — 80076 HEPATIC FUNCTION PANEL: CPT | Performed by: INTERNAL MEDICINE

## 2020-12-08 PROCEDURE — 83735 ASSAY OF MAGNESIUM: CPT | Performed by: INTERNAL MEDICINE

## 2020-12-08 PROCEDURE — 80197 ASSAY OF TACROLIMUS: CPT | Performed by: INTERNAL MEDICINE

## 2020-12-09 LAB
TACROLIMUS BLD-MCNC: 4.2 UG/L (ref 5–15)
TME LAST DOSE: ABNORMAL H

## 2021-01-05 DIAGNOSIS — Z13.220 LIPID SCREENING: ICD-10-CM

## 2021-01-05 DIAGNOSIS — Z94.4 LIVER REPLACED BY TRANSPLANT (H): ICD-10-CM

## 2021-01-05 LAB
ALBUMIN SERPL-MCNC: 4 G/DL (ref 3.4–5)
ALP SERPL-CCNC: 71 U/L (ref 40–150)
ALT SERPL W P-5'-P-CCNC: 45 U/L (ref 0–70)
ANION GAP SERPL CALCULATED.3IONS-SCNC: 3 MMOL/L (ref 3–14)
AST SERPL W P-5'-P-CCNC: 22 U/L (ref 0–45)
BILIRUB DIRECT SERPL-MCNC: 0.1 MG/DL (ref 0–0.2)
BILIRUB SERPL-MCNC: 0.4 MG/DL (ref 0.2–1.3)
BUN SERPL-MCNC: 20 MG/DL (ref 7–30)
CALCIUM SERPL-MCNC: 9 MG/DL (ref 8.5–10.1)
CHLORIDE SERPL-SCNC: 109 MMOL/L (ref 94–109)
CO2 SERPL-SCNC: 27 MMOL/L (ref 20–32)
CREAT SERPL-MCNC: 1.32 MG/DL (ref 0.66–1.25)
ERYTHROCYTE [DISTWIDTH] IN BLOOD BY AUTOMATED COUNT: 13.1 % (ref 10–15)
GFR SERPL CREATININE-BSD FRML MDRD: 67 ML/MIN/{1.73_M2}
GLUCOSE SERPL-MCNC: 112 MG/DL (ref 70–99)
HCT VFR BLD AUTO: 49.8 % (ref 40–53)
HGB BLD-MCNC: 16.6 G/DL (ref 13.3–17.7)
MAGNESIUM SERPL-MCNC: 2.1 MG/DL (ref 1.6–2.3)
MCH RBC QN AUTO: 29.9 PG (ref 26.5–33)
MCHC RBC AUTO-ENTMCNC: 33.3 G/DL (ref 31.5–36.5)
MCV RBC AUTO: 90 FL (ref 78–100)
PLATELET # BLD AUTO: 147 10E9/L (ref 150–450)
POTASSIUM SERPL-SCNC: 4.3 MMOL/L (ref 3.4–5.3)
PROT SERPL-MCNC: 7.1 G/DL (ref 6.8–8.8)
RBC # BLD AUTO: 5.56 10E12/L (ref 4.4–5.9)
SODIUM SERPL-SCNC: 139 MMOL/L (ref 133–144)
TACROLIMUS BLD-MCNC: 4.9 UG/L (ref 5–15)
TME LAST DOSE: ABNORMAL H
WBC # BLD AUTO: 5.3 10E9/L (ref 4–11)

## 2021-01-05 PROCEDURE — 80197 ASSAY OF TACROLIMUS: CPT | Performed by: INTERNAL MEDICINE

## 2021-01-05 PROCEDURE — 36415 COLL VENOUS BLD VENIPUNCTURE: CPT | Performed by: INTERNAL MEDICINE

## 2021-01-05 PROCEDURE — 80076 HEPATIC FUNCTION PANEL: CPT | Performed by: INTERNAL MEDICINE

## 2021-01-05 PROCEDURE — 83735 ASSAY OF MAGNESIUM: CPT | Performed by: INTERNAL MEDICINE

## 2021-01-05 PROCEDURE — 80048 BASIC METABOLIC PNL TOTAL CA: CPT | Performed by: INTERNAL MEDICINE

## 2021-01-05 PROCEDURE — 85027 COMPLETE CBC AUTOMATED: CPT | Performed by: INTERNAL MEDICINE

## 2021-01-18 ENCOUNTER — MYC MEDICAL ADVICE (OUTPATIENT)
Dept: PSYCHOLOGY | Facility: CLINIC | Age: 41
End: 2021-01-18

## 2021-01-20 ENCOUNTER — TELEPHONE (OUTPATIENT)
Dept: PSYCHIATRY | Facility: CLINIC | Age: 41
End: 2021-01-20

## 2021-01-20 DIAGNOSIS — F41.0 PANIC DISORDER WITHOUT AGORAPHOBIA: ICD-10-CM

## 2021-01-20 RX ORDER — LORAZEPAM 1 MG/1
1 TABLET ORAL DAILY PRN
Qty: 15 TABLET | Refills: 0 | Status: SHIPPED | OUTPATIENT
Start: 2021-01-20 | End: 2021-06-17

## 2021-01-20 NOTE — TELEPHONE ENCOUNTER
Routing refill request to provider for review/approval because:  Drug not on the [facility] refill protocol       Ruben Benjamin RN, BSN, PHN

## 2021-02-02 DIAGNOSIS — E83.42 HYPOMAGNESEMIA: ICD-10-CM

## 2021-02-02 RX ORDER — MAGNESIUM OXIDE 400 MG/1
TABLET ORAL
Qty: 120 TABLET | Refills: 11 | Status: SHIPPED | OUTPATIENT
Start: 2021-02-02 | End: 2022-05-04

## 2021-02-04 DIAGNOSIS — Z94.0 S/P KIDNEY TRANSPLANT: ICD-10-CM

## 2021-02-04 DIAGNOSIS — Z94.4 LIVER REPLACED BY TRANSPLANT (H): ICD-10-CM

## 2021-02-04 RX ORDER — SULFAMETHOXAZOLE AND TRIMETHOPRIM 400; 80 MG/1; MG/1
1 TABLET ORAL DAILY
Qty: 30 TABLET | Refills: 11 | Status: SHIPPED | OUTPATIENT
Start: 2021-02-04 | End: 2022-01-31

## 2021-02-22 NOTE — PROGRESS NOTES
Collaborative Care Psychiatry Service (CCPS)  Feb 23 2021      Behavioral Health Clinician Progress Note    Patient Name: Cricket Chen        Consent has been obtained for this service by care team member: yes.      As the provider I attest to compliance with applicable laws and regulations related to telemedicine.         Service Type:  Individual      Service Location:   video/pt remote location/provider clinc location-[place]      Session Start Time: 11:15am  Session End Time:1132am      Session Length: 16 - 37      Attendees: Patient    Visit Activities (Refresh list every visit): [facility] Only    Diagnostic Assessment Date: 10/15/2020  See Flowsheets for today's PHQ-9 and DEBBIE-7 results  Previous PHQ-9:   PHQ-9 SCORE 10/14/2020 11/6/2020 2/23/2021   PHQ-9 Total Score MyChart 4 (Minimal depression) - -   PHQ-9 Total Score 4 5 2       Previous DEBBIE-7:   DEBBIE-7 SCORE 10/14/2020 11/6/2020 2/23/2021   Total Score 5 (mild anxiety) - -   Total Score 5 3 1       No flowsheet data found.     DATA  Extended Session (60+ minutes): No  Interactive Complexity: No  Crisis: No    Medication Compliance:  Yes      Chemical Use Review:   Substance Use: Chemical use reviewed, no active concerns identified      Tobacco Use: No current tobacco use.      Current Stressors / Issues:  Everything has been good. No problems. Meds are working, no side effects. Sleeping even good and bad. Fewer heart palpitations. Wore a heart monitor for 2 weeks about 2 months ago but doesn't have results yet.Reviewed skills that were suggested from last meeting but he hasn't tried them because anxiety has been more manageable. Not using ativan very often. Updated PHQ9 and GAD7.        Changes in Health Issues:   None reported    Assessment: Current Emotional / Mental Status (status of significant symptoms):  Risk status (Self / Other harm or suicidal ideation)  Patient denies a history of suicidal ideation, suicide attempts, self-injurious behavior,  homicidal ideation, homicidal behavior and and other safety concerns  Patient denies current fears or concerns for personal safety.  Patient denies current or recent suicidal ideation or behaviors.  Patient denies current or recent homicidal ideation or behaviors.  Patient denies current or recent self injurious behavior or ideation.  Patient denies other safety concerns.  A safety and risk management plan has not been developed at this time, however patient was encouraged to call [place] [phone] should there be a change in any of these risk factors.    Appearance:   Appropriate   Eye Contact:   Good   Psychomotor Behavior: Normal   Attitude:   Cooperative   Orientation:   All  Speech   Rate / Production: Normal    Volume:  Normal   Mood:    Normal  Affect:    Unable to assess over the phone   Thought Content:  Clear   Thought Form:  Coherent  Logical   Insight:    Good     Diagnoses:  1. Generalized anxiety disorder        Collateral Reports Completed:  Communicated with: Dr. Tyler    Plan: (Homework, other):  Patient was given information about behavioral services and encouraged to schedule a follow up appointment with the clinic [facility]  He was also given information about mental health symptoms and treatment options .  CD Recommendations: No indications of CD issues.           Sofía Durbin, [facility] Medical CenterSW  Feb 23 2021

## 2021-02-23 ENCOUNTER — VIRTUAL VISIT (OUTPATIENT)
Dept: BEHAVIORAL HEALTH | Facility: CLINIC | Age: 41
End: 2021-02-23
Payer: COMMERCIAL

## 2021-02-23 ENCOUNTER — VIRTUAL VISIT (OUTPATIENT)
Dept: PSYCHIATRY | Facility: CLINIC | Age: 41
End: 2021-02-23
Payer: COMMERCIAL

## 2021-02-23 DIAGNOSIS — G47.00 INSOMNIA, UNSPECIFIED TYPE: Primary | ICD-10-CM

## 2021-02-23 DIAGNOSIS — F41.0 PANIC DISORDER WITHOUT AGORAPHOBIA: ICD-10-CM

## 2021-02-23 DIAGNOSIS — F41.1 GENERALIZED ANXIETY DISORDER: Primary | ICD-10-CM

## 2021-02-23 DIAGNOSIS — F10.21 ALCOHOL USE DISORDER, SEVERE, IN SUSTAINED REMISSION (H): ICD-10-CM

## 2021-02-23 PROCEDURE — 90832 PSYTX W PT 30 MINUTES: CPT | Mod: 95 | Performed by: SOCIAL WORKER

## 2021-02-23 PROCEDURE — 99214 OFFICE O/P EST MOD 30 MIN: CPT | Mod: 95 | Performed by: PSYCHIATRY & NEUROLOGY

## 2021-02-23 RX ORDER — TRAZODONE HYDROCHLORIDE 50 MG/1
50 TABLET, FILM COATED ORAL
Qty: 60 TABLET | Refills: 2 | Status: SHIPPED | OUTPATIENT
Start: 2021-02-23 | End: 2021-07-13

## 2021-02-23 ASSESSMENT — ANXIETY QUESTIONNAIRES
3. WORRYING TOO MUCH ABOUT DIFFERENT THINGS: NOT AT ALL
GAD7 TOTAL SCORE: 1
5. BEING SO RESTLESS THAT IT IS HARD TO SIT STILL: NOT AT ALL
7. FEELING AFRAID AS IF SOMETHING AWFUL MIGHT HAPPEN: NOT AT ALL
1. FEELING NERVOUS, ANXIOUS, OR ON EDGE: NOT AT ALL
2. NOT BEING ABLE TO STOP OR CONTROL WORRYING: NOT AT ALL
IF YOU CHECKED OFF ANY PROBLEMS ON THIS QUESTIONNAIRE, HOW DIFFICULT HAVE THESE PROBLEMS MADE IT FOR YOU TO DO YOUR WORK, TAKE CARE OF THINGS AT HOME, OR GET ALONG WITH OTHER PEOPLE: NOT DIFFICULT AT ALL
6. BECOMING EASILY ANNOYED OR IRRITABLE: NOT AT ALL

## 2021-02-23 ASSESSMENT — PATIENT HEALTH QUESTIONNAIRE - PHQ9
SUM OF ALL RESPONSES TO PHQ QUESTIONS 1-9: 2
5. POOR APPETITE OR OVEREATING: SEVERAL DAYS

## 2021-02-23 NOTE — Clinical Note
[place] Cricket for follow-up today.  He continues to do very well.  I am hoping to return his care to you for ongoing management at his next follow-up appointment with me in 2-3 months.  Please look at his treatment plan and let me know if you have any questions or concerns regarding his return of care in the next couple months.  He continues to maintain his years of sobriety from alcohol and only very rarely uses Ativan.  His first prescription of 15 x 1 mg tablets lasted 3 months.  Keep me posted! -Yoko

## 2021-02-23 NOTE — Clinical Note
Please call this patient to get them scheduled for a follow-up visit in 3 months. Please schedule with me and the [facility]. Thanks!

## 2021-02-23 NOTE — PROGRESS NOTES
"Cricket Chen is a 40 year old year old who is being evaluated via a billable video visit.      How would you like to obtain your AVS? MyChart  If you are dropped from the video visit, the video invite should be resent to: Text to cell phone: see Epic  Will anyone else be joining your video visit? No       Telemedicine Visit: The patient's condition can be safely assessed and treated via synchronous audio and visual telemedicine encounter.      Reason for Telemedicine Visit: Covid-19 Pandemic    Originating Site (Patient Location): Patient's home     Distant Site (Provider Location): Provider Remote Setting    Mode of Communication:  Video Conference via [employer]    As the provider I attest to compliance with applicable laws and regulations related to telemedicine.        Outpatient Psychiatric Progress Note    Name: Cricket Chen   : 1980                    Primary Care Provider: Physician No Ref-Primary   Therapist: None    PHQ-9 scores:  PHQ-9 SCORE 10/14/2020 2020 2021   PHQ-9 Total Score MyChart 4 (Minimal depression) - -   PHQ-9 Total Score 4 5 2       DEBBIE-7 scores:  DEBBIE-7 SCORE 10/14/2020 2020 2021   Total Score 5 (mild anxiety) - -   Total Score 5 3 1       Patient Identification:  Patient is a 40 year old, single  White American male  who presents for return visit with me.  Patient is currently employed full time. Patient attended the phone/video session alone. Patient prefers to be called: \"Isidro\".    Interim History:  I last saw Cricket Chen for outpatient psychiatry return visit on 2020. During that appointment, we:      Continue Ativan/lorazepam 1 mg daily as needed for severe anxiety/panic/sleep.  Continue to use sparingly.  Send a [employer] message when you are in need of a refill.    See if your sleep improves over the next 2-3 days.  His sleep continues to be very poor, restart BuSpar 5 mg in the evening daily to see if this is helpful.  If there is " "slight improvement in your sleep, but you are still struggling, return to BuSpar 5 mg twice daily dosing.    Work on mindfulness skills/techniques at bedtime.  Once such skill is called \"guided meditation.\"  There are multiple free videos/recordings online you can listen to to practice this skill.    2/23: Pt reports things are going well.  He has noticed really good improvements in his anxiety.  Very rarely takes Ativan.  First prescription of 15 x 1 mg tablets given in October lasted until the end of January.  Is hoping the COVID pandemic will be over soon.  He is getting tired of feeling so restricted and cooped up inside.  Also looking forward to warmer weather.  Work is going well and he is functioning normally.  Mood good/stable.  Sleep still struggle about 2 nights a week.  He does not take naps during the day and tries his best to keep a consistent schedule.  These couple nights he will have trouble falling asleep.  Hydroxyzine in the past not effective.  Has been on trazodone and tolerated well.  He cannot remember how effective the medication was for sleep.  Denies any safety concerns.  No thoughts of suicide.  No drug or alcohol use/concerns.    Per [facility], Sofía Durbin, [facility], during today's team-based visit:  Everything has been good. No problems. Meds are working, no side effects. Sleeping even good and bad. Fewer heart palpitations. Wore a heart monitor for 2 weeks about 2 months ago but doesn't have results yet.Reviewed skills that were suggested from last meeting but he hasn't tried them because anxiety has been more manageable. Not using ativan very often. Updated PHQ9 and GAD7.    Psychiatric ROS:  Circket Chen reports mood has been: Improved  Anxiety has been: Improved  Sleep has been: Slightly improved, but still struggling a couple nights a week at least  Trinh sxs: None  Psychosis sxs: None  ADHD/ADD sxs: None  PTSD sxs: None  PHQ9 and GAD7 scores were reviewed today if completed. "   Medication side effects: Denies  Current stressors include: Symptoms, COVID-19  Coping mechanisms and supports include: Family, Hobbies and Friends    Current medications include:   Current Outpatient Medications   Medication Sig     apixaban ANTICOAGULANT (ELIQUIS ANTICOAGULANT) 5 MG tablet Take 1 tablet (5 mg) by mouth 2 times daily     ketoconazole (NIZORAL) 2 % cream Twice daily to areas of rash on face     levothyroxine (SYNTHROID/LEVOTHROID) 137 MCG tablet Take 1 tablet by mouth Monday - Saturday  and 1.5 tablets on Sunday     lisinopril (ZESTRIL) 10 MG tablet TAKE ONE TABLET BY MOUTH ONCE DAILY     LORazepam (ATIVAN) 1 MG tablet Take 1 tablet (1 mg) by mouth daily as needed (severe anxiety/panic/sleep)     magnesium oxide (MAG-OX) 400 MG tablet TAKE ONE TABLET BY MOUTH TWICE A DAY     metoprolol succinate ER (TOPROL-XL) 25 MG 24 hr tablet Take 1 tablet (25 mg) by mouth 2 times daily     mycophenolate (GENERIC EQUIVALENT) 250 MG capsule Take 2 capsules (500 mg) by mouth 2 times daily     sulfamethoxazole-trimethoprim (BACTRIM) 400-80 MG tablet Take 1 tablet by mouth daily     tacrolimus (GENERIC EQUIVALENT) 0.5 MG capsule Take 1 capsule (0.5 mg) by mouth every morning Take with 1mg tabs, total of 1.5mg am, 1mg pm     tacrolimus (GENERIC EQUIVALENT) 1 MG capsule Take 1 capsule (1 mg) by mouth every 12 hours Take with .5 mg tabs, total of 1.5mg am, 1mg pm     No current facility-administered medications for this visit.        The [place] Prescription Monitoring Program has been reviewed and there are no concerns about diversionary activity for controlled substances at this time.   01/21/2021 1 01/21/2021 01/22/2021 Lorazepam 1 Mg Tablet  15.00 15 Al [facility] [id number] Justice ([id number]) 0/0 1.00 E Medicare [place]  10/15/2020 1 10/15/2020 10/15/2020 Lorazepam 1 Mg Tablet  15.00 15 Al [facility] [id number] Justice ([id number]) 0/0 1.00 LME Medicare    Past Medical/Surgical History:  Past Medical History:   Diagnosis Date     Alcohol abuse      Last drink in Mid-April 2014     Anemia in ESRD (end-stage renal disease) (H)      Anxiety 2008     Atrial flutter (H) 2017     Cirrhosis (H)     S/P liver transplant     Depression      History of blood transfusion      History of transposition of great vessels     atrial switch at age 8 months old     Hypertension      Liver transplant recipient (H)     2016     Papillary thyroid carcinoma (H)      Pneumonia 11-15-14     Renal transplant recipient     2016     Varices, esophageal (H)       has a past medical history of Alcohol abuse, Anemia in ESRD (end-stage renal disease) (H), Anxiety (2008), Atrial flutter (H) (2017), Cirrhosis (H), Depression, History of blood transfusion, History of transposition of great vessels, Hypertension, Liver transplant recipient (H), Papillary thyroid carcinoma (H), Pneumonia (11-15-14), Renal transplant recipient, and Varices, esophageal (H). He also has no past medical history of Arthritis, Cerebral infarction (H), Congestive heart failure, unspecified, COPD (chronic obstructive pulmonary disease) (H), Coronary artery disease, CVA (cerebral infarction), Depressive disorder, Diabetes (H), or Uncomplicated asthma.    Social History:  Reviewed. No changes to social history except as noted in HPI above.     Vital Signs:   None. This is phone/video visit.     Labs:  Most recent laboratory results reviewed and the pertinent results include:  Last Comprehensive Metabolic Panel:  Sodium   Date Value Ref Range Status   01/05/2021 139 133 - 144 mmol/L Final     Potassium   Date Value Ref Range Status   01/05/2021 4.3 3.4 - 5.3 mmol/L Final     Chloride   Date Value Ref Range Status   01/05/2021 109 94 - 109 mmol/L Final     Carbon Dioxide   Date Value Ref Range Status   01/05/2021 27 20 - 32 mmol/L Final     Anion Gap   Date Value Ref Range Status   01/05/2021 3 3 - 14 mmol/L Final     Glucose   Date Value Ref Range Status   01/05/2021 112 (H) 70 - 99 mg/dL Final     Urea Nitrogen   Date  Value Ref Range Status   01/05/2021 20 7 - 30 mg/dL Final     Creatinine   Date Value Ref Range Status   01/05/2021 1.32 (H) 0.66 - 1.25 mg/dL Final     GFR Estimate   Date Value Ref Range Status   01/05/2021 67 >60 mL/min/[1.73_m2] Final     Comment:     Non  GFR Calc  Starting 12/18/2018, serum creatinine based estimated GFR (eGFR) will be   calculated using the Chronic Kidney Disease Epidemiology Collaboration   (CKD-EPI) equation.       Calcium   Date Value Ref Range Status   01/05/2021 9.0 8.5 - 10.1 mg/dL Final     Lab Results   Component Value Date    AST 18 11/03/2020     Lab Results   Component Value Date    ALT 22 11/03/2020     Lab Results   Component Value Date    BILICONJ 2.4 07/15/2014      Lab Results   Component Value Date    BILITOTAL 0.3 11/03/2020     Lab Results   Component Value Date    ALBUMIN 4.1 11/03/2020     Lab Results   Component Value Date    PROTTOTAL 6.9 11/03/2020      Lab Results   Component Value Date    ALKPHOS 61 11/03/2020     Lab Results   Component Value Date    WBC 5.9 11/03/2020     Lab Results   Component Value Date    RBC 5.30 11/03/2020     Lab Results   Component Value Date    HGB 16.2 11/03/2020     Lab Results   Component Value Date    HCT 47.6 11/03/2020     No components found for: MCT  Lab Results   Component Value Date    MCV 90 11/03/2020     Lab Results   Component Value Date    MCH 30.6 11/03/2020     Lab Results   Component Value Date    MCHC 34.0 11/03/2020     Lab Results   Component Value Date    RDW 13.7 11/03/2020     Lab Results   Component Value Date     11/03/2020     Review of Systems:  10 systems (general, cardiovascular, respiratory, eyes, ENT, endocrine, GI, , M/S, neurological) were reviewed. Most pertinent finding(s) is/are: denies fever, cough, headaches, shortness of breath, chest pain, N/V, constipation/diarrhea, trouble urinating, aches and pains. The remaining systems are all unremarkable.    Mental Status Examination  (limited as this is by phone/video):  Appearance: Awake, alert, well-groomed, appears stated age, no acute distress  Attitude:  cooperative, pleasant  Motor: No gross abnormalities observed via video, not formally tested  Oriented to:  person, place, time, and situation  Attention Span and Concentration:  normal  Speech:  clear, coherent, regular rate, rhythm, and volume  Language: intact  Mood:  good  Affect:  appropriate and in normal range and mood congruent  Associations:  no loose associations  Thought Process:  logical, linear and goal oriented  Thought Content:  no evidence of suicidal ideation or homicidal ideation, no evidence of psychotic thought, no auditory hallucinations present and no visual hallucinations present  Recent and Remote Memory:  Intact to interview. Not formally assessed. No amnesia.  Fund of Knowledge: appropriate  Insight:  good  Judgment:  intact, adequate for safety  Impulse Control:  intact    Suicide Risk Assessment:  Today Cricket Chen reports no suicidal ideation. Based on all available evidence including the factors cited above, Cricket Chen does not appear to be at imminent risk for self-harm, does not meet criteria for a 72-hr hold, and therefore remains appropriate for ongoing outpatient level of care.  A thorough assessment of risk factors related to suicide and self-harm have been reviewed and are noted above. The patient convincingly denies suicidality on several occasions. Local community safety resources printed and reviewed for patient to use if needed. There was no deceit detected, and the patient presented in a manner that was believable.     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     Priority: High     Shortness of breath  04/15/2019     Priority: Medium     Added automatically from request for surgery [id number]       Other ill-defined heart diseases 04/15/2019     Priority: Medium     Added automatically from request for surgery [id number]       Chest pain 01/17/2019     Priority: Medium     Dermatitis, seborrheic 08/13/2018     Priority: Medium     Arm mass, right 07/25/2018     Priority: Medium     Postoperative hypothyroidism 04/04/2018     Priority: Medium     Atrial fibrillation (H) 03/07/2018     Priority: Medium     Hypertension secondary to other renal disorders 01/16/2018     Priority: Medium     Papillary thyroid carcinoma (H) 03/16/2017     Priority: Medium     Aftercare following organ transplant 01/03/2017     Priority: Medium     Chronic pain syndrome 07/12/2016     Priority: Medium     Pain management contract signed 07/12/2016     Priority: Medium     Signed contract for refills of Tramadol. He takes this for leg pain from cramping. Taking tramadol 50 mg tablets, 1 tablet up to BID. OK for 45 tablets per month.       Hypomagnesemia 07/09/2016     Priority: Medium     Secondary renal hyperparathyroidism (H) 07/09/2016     Priority: Medium     Immunosuppressed status (H) 05/19/2016     Priority: Medium     Liver replaced by transplant (H) 05/11/2016     Priority: Medium     Kidney replaced by transplant 05/11/2016     Priority: Medium     [facility] 03/06/2015     Priority: Medium     For urgent after hours needs, please call [phone] and ask to speak with the [facility] Physician on call - mention job code [phone].         Pain medication agreement 02/18/2015     Priority: Medium     Thrombocytopenia (H) 10/20/2014     Priority: Medium     History of transposition of great vessels      Priority: Medium     8 months old       Depression      Priority: Medium     History of alcohol abuse 08/05/2014     Priority: Medium     Sober since  summer 2014 per patient.       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    Patient has identified Specific Treatment Preferences: No   Specific limits to treatment preferences NOT identified: ASSUME FULL TREATMENT.       Alcohol dependence (H) 07/06/2012     Priority: Medium       Psychosocial & Contextual Factors: see HPI    Assessment:  Cricket ISAAC Erikacindifloridanik reports overall ongoing stability regarding the improvement of his symptoms.  He is not quite sure what has contributed to most of the improvement in his anxiety.  He has not had to use Ativan much at all lately.  Sleep slightly improved since last visit but will still often struggle at least a couple nights per week.  Has tolerated trazodone well in the past with some efficacy.  I am hoping it might work better for him at this time since he likely will not need to take it every night.  He is agreeable to a trial to take as needed for sleep at bedtime.  We will hold off on restarting BuSpar at this time, since that would work better as an everyday medication (as it has in the past) and he would prefer to stick with as needed medications at this time since overall doing quite well.  No current medical  concerns.  No drug or alcohol use.  No safety concerns.    Medication side effects and alternatives were reviewed. Health promotion activities recommended and reviewed today. All questions addressed. Education and counseling completed regarding risks and benefits of medications and psychotherapy options. Recommend therapy for additional support.     Treatment Plan:    Continue Ativan/lorazepam 1 mg daily as needed for severe anxiety/panic/sleep.  Continue to use sparingly.  Send a [employer] message when you are in need of a refill.    Start trazodone  mg at bedtime as needed for sleep. Can always break a tab in half and take 25 mg if 50 mg too sedating.     Continue all other medications as reviewed per electronic medical record today.     Safety plan reviewed. To the Emergency Department as needed or call after hours crisis line at [phone] or [phone]. [place] Crisis Text Line. Text [place] to 675475 or Suicide LifeLine Chat: suicidepreHeadCountline.org/chat    Consider starting individual therapy if your symptoms continue to worsen or do not improve.    Schedule an appointment with me in 6 weeks or sooner as needed. Call [facility] at [phone] to schedule if someone does not reach out to you within 2-3 days.     Follow up with primary care provider as planned or for acute medical concerns.    Call the psychiatric nurse line with medication questions or concerns at [phone].    [employer] may be used to communicate with your provider, but this is not intended to be used for emergencies.    Risks of benzodiazepine (Ativan, Xanax, Klonopin, Valium, etc) use including, but not limited to, sedation, tolerance, risk for addiction/dependence. Do not drink alcohol while taking benzodiazepines due to risk of trouble breathing and potential death. Do not drive or operate heavy machinery until it is known how the drug affects you. Discuss with physician or pharmacist before ever taking a  benzodiazepine with a narcotic/opioid pain medication.     Administrative Billing:   Phone Call/Video Duration: 8 Minutes  Start: 11:48a  Stop: 11:56a    Time spent with patient was 8 minutes and greater than 50% of time or 5 minutes was spent in counseling and coordination of care regarding above diagnoses and treatment plan.  Patient with multiple psychiatric diagnoses and medication change adding to complexity of care.      Patient Status:  Patient will continue to be seen for ongoing consultation and stabilization.    Signed:   Yoko Tyler DO  [facility]

## 2021-02-23 NOTE — PATIENT INSTRUCTIONS
Treatment Plan:    Continue Ativan/lorazepam 1 mg daily as needed for severe anxiety/panic/sleep.  Continue to use sparingly.  Send a [employer] message when you are in need of a refill.    Start trazodone  mg at bedtime as needed for sleep. Can always break a tab in half and take 25 mg if 50 mg too sedating.     Continue all other medications as reviewed per electronic medical record today.     Safety plan reviewed. To the Emergency Department as needed or call after hours crisis line at [phone] or [phone]. [place] Crisis Text Line. Text [place] to 510687 or Suicide LifeLine Chat: [email or web address] Wirelessline.org/chat    Consider starting individual therapy if your symptoms continue to worsen or do not improve.    Schedule an appointment with me in 6 weeks or sooner as needed. Call Located within [facility] at [phone] to schedule if someone does not reach out to you within 2-3 days.     Follow up with primary care provider as planned or for acute medical concerns.    Call the psychiatric nurse line with medication questions or concerns at [phone].    [employer] may be used to communicate with your provider, but this is not intended to be used for emergencies.    Risks of benzodiazepine (Ativan, Xanax, Klonopin, Valium, etc) use including, but not limited to, sedation, tolerance, risk for addiction/dependence. Do not drink alcohol while taking benzodiazepines due to risk of trouble breathing and potential death. Do not drive or operate heavy machinery until it is known how the drug affects you. Discuss with physician or pharmacist before ever taking a benzodiazepine with a narcotic/opioid pain medication.

## 2021-02-24 ASSESSMENT — ANXIETY QUESTIONNAIRES: GAD7 TOTAL SCORE: 1

## 2021-03-04 DIAGNOSIS — Z94.0 KIDNEY TRANSPLANT RECIPIENT: Primary | ICD-10-CM

## 2021-03-04 DIAGNOSIS — Z79.60 LONG-TERM USE OF IMMUNOSUPPRESSANT MEDICATION: ICD-10-CM

## 2021-03-04 RX ORDER — MYCOPHENOLATE MOFETIL 250 MG/1
500 CAPSULE ORAL 2 TIMES DAILY
Qty: 120 CAPSULE | Refills: 0 | Status: SHIPPED | OUTPATIENT
Start: 2021-03-04 | End: 2021-04-08

## 2021-03-04 NOTE — TELEPHONE ENCOUNTER
ISSUE  Received a request to refill mycophenolate. Overdue for nephrology appointment.    OUTCOME  Message sent to scheduling.

## 2021-03-10 DIAGNOSIS — Z13.220 LIPID SCREENING: ICD-10-CM

## 2021-03-10 DIAGNOSIS — Z94.4 LIVER REPLACED BY TRANSPLANT (H): ICD-10-CM

## 2021-03-10 LAB
ALBUMIN SERPL-MCNC: 4 G/DL (ref 3.4–5)
ALP SERPL-CCNC: 64 U/L (ref 40–150)
ALT SERPL W P-5'-P-CCNC: 27 U/L (ref 0–70)
ANION GAP SERPL CALCULATED.3IONS-SCNC: 5 MMOL/L (ref 3–14)
AST SERPL W P-5'-P-CCNC: 16 U/L (ref 0–45)
BILIRUB DIRECT SERPL-MCNC: 0.1 MG/DL (ref 0–0.2)
BILIRUB SERPL-MCNC: 0.5 MG/DL (ref 0.2–1.3)
BUN SERPL-MCNC: 17 MG/DL (ref 7–30)
CALCIUM SERPL-MCNC: 9.2 MG/DL (ref 8.5–10.1)
CHLORIDE SERPL-SCNC: 110 MMOL/L (ref 94–109)
CO2 SERPL-SCNC: 25 MMOL/L (ref 20–32)
CREAT SERPL-MCNC: 1.2 MG/DL (ref 0.66–1.25)
ERYTHROCYTE [DISTWIDTH] IN BLOOD BY AUTOMATED COUNT: 13 % (ref 10–15)
GFR SERPL CREATININE-BSD FRML MDRD: 75 ML/MIN/{1.73_M2}
GLUCOSE SERPL-MCNC: 99 MG/DL (ref 70–99)
HCT VFR BLD AUTO: 47.6 % (ref 40–53)
HGB BLD-MCNC: 16.1 G/DL (ref 13.3–17.7)
MAGNESIUM SERPL-MCNC: 2 MG/DL (ref 1.6–2.3)
MCH RBC QN AUTO: 29.7 PG (ref 26.5–33)
MCHC RBC AUTO-ENTMCNC: 33.8 G/DL (ref 31.5–36.5)
MCV RBC AUTO: 88 FL (ref 78–100)
PLATELET # BLD AUTO: 137 10E9/L (ref 150–450)
POTASSIUM SERPL-SCNC: 4 MMOL/L (ref 3.4–5.3)
PROT SERPL-MCNC: 7.1 G/DL (ref 6.8–8.8)
RBC # BLD AUTO: 5.43 10E12/L (ref 4.4–5.9)
SODIUM SERPL-SCNC: 140 MMOL/L (ref 133–144)
TACROLIMUS BLD-MCNC: 4.1 UG/L (ref 5–15)
TME LAST DOSE: ABNORMAL H
WBC # BLD AUTO: 4.9 10E9/L (ref 4–11)

## 2021-03-10 PROCEDURE — 85027 COMPLETE CBC AUTOMATED: CPT | Performed by: INTERNAL MEDICINE

## 2021-03-10 PROCEDURE — 80048 BASIC METABOLIC PNL TOTAL CA: CPT | Performed by: INTERNAL MEDICINE

## 2021-03-10 PROCEDURE — 80076 HEPATIC FUNCTION PANEL: CPT | Performed by: INTERNAL MEDICINE

## 2021-03-10 PROCEDURE — 80197 ASSAY OF TACROLIMUS: CPT | Performed by: INTERNAL MEDICINE

## 2021-03-10 PROCEDURE — 83735 ASSAY OF MAGNESIUM: CPT | Performed by: INTERNAL MEDICINE

## 2021-03-10 PROCEDURE — 36415 COLL VENOUS BLD VENIPUNCTURE: CPT | Performed by: INTERNAL MEDICINE

## 2021-03-11 ENCOUNTER — APPOINTMENT (OUTPATIENT)
Dept: LAB | Facility: CLINIC | Age: 41
End: 2021-03-11
Attending: INTERNAL MEDICINE
Payer: COMMERCIAL

## 2021-03-11 ENCOUNTER — OFFICE VISIT (OUTPATIENT)
Dept: TRANSPLANT | Facility: CLINIC | Age: 41
End: 2021-03-11
Attending: INTERNAL MEDICINE
Payer: COMMERCIAL

## 2021-03-11 VITALS
BODY MASS INDEX: 30.62 KG/M2 | TEMPERATURE: 98.7 F | RESPIRATION RATE: 17 BRPM | DIASTOLIC BLOOD PRESSURE: 89 MMHG | OXYGEN SATURATION: 98 % | WEIGHT: 213.4 LBS | HEART RATE: 72 BPM | SYSTOLIC BLOOD PRESSURE: 143 MMHG

## 2021-03-11 DIAGNOSIS — D47.Z1 POST-TRANSPLANT LYMPHOPROLIFERATIVE DISORDER (H): Primary | ICD-10-CM

## 2021-03-11 PROCEDURE — G0463 HOSPITAL OUTPT CLINIC VISIT: HCPCS

## 2021-03-11 PROCEDURE — 99213 OFFICE O/P EST LOW 20 MIN: CPT | Performed by: INTERNAL MEDICINE

## 2021-03-11 ASSESSMENT — PAIN SCALES - GENERAL: PAINLEVEL: NO PAIN (0)

## 2021-03-11 NOTE — PROGRESS NOTES
[facility] Clinic Visit  Mar 11, 2021    Reason for Visit: follow-up PTLD      Disease and Transplant History:  1. 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   -- CellCept was reduced from 1500 b.i.d. to 250 twice a day, and  tacrolimus 3 mg twice a day was decreased to 2 mg twice a day.   2. He tolerated the RSST well and completed 4 weekly infusions of rituximab followed by R-COEP x4 (last cycle completed on 6/7/2017).   -- Post Treatment PET/CT scan and he had achieved CR1. Notably, his diagnostic PET/CT scan identified a thyroid nodule, which was consistent with papillary thyroid carcinoma based on the ultrasound-guided fine needle aspiration and he underwent thyroidectomy.    -- Repeat CT chest, abdomen and pelvis on 7/8/2019 did not show any evidence of active PTLD.  3. Observation        HPI: Isidro presents for follow-up today for his history of PTLD. We did a video visit 6 months ago and he comes in today for in person follow-up. Today he notes that he is doing well overall. Has had a couple of episodes of palpitations and seen in the ED. Wore a heart monitor for a couple weeks and hasn't heard of any issues. No other concerns. No fevers or chills, no chest pain or SOB, no night sweats, no weight loss, no new lumps or bumps.      10 point ROS otherwise negative     Physical Exam:  BP (!) 143/89   Pulse 72   Temp 98.7  F (37.1  C) (Oral)   Resp 17   Wt 96.8 kg (213 lb 6.4 oz)   SpO2 98%   BMI 30.62 kg/m    Gen: Well appearing  HEENT: no icterus or injection, no palpable lymph nodes  Lungs: CTAB no crackles or wheezes  CV: RRR no r/m/g  Abd: Soft, NT/ND  Ext: no edema        Labs:  Results for IJEOMA DOBSON (MRN [id number]) as of 3/11/2021 14:06   Ref. Range 3/10/2021 11:17   Sodium Latest Ref Range: 133 - 144 mmol/L 140   Potassium Latest Ref Range: 3.4 - 5.3  mmol/L 4.0   Chloride Latest Ref Range: 94 - 109 mmol/L 110 (H)   Carbon Dioxide Latest Ref Range: 20 - 32 mmol/L 25   Urea Nitrogen Latest Ref Range: 7 - 30 mg/dL 17   Creatinine Latest Ref Range: 0.66 - 1.25 mg/dL 1.20   GFR Estimate Latest Ref Range: >60 mL/min/1.73_m2 75   GFR Estimate If Black Latest Ref Range: >60 mL/min/1.73_m2 87   Calcium Latest Ref Range: 8.5 - 10.1 mg/dL 9.2   Anion Gap Latest Ref Range: 3 - 14 mmol/L 5   Magnesium Latest Ref Range: 1.6 - 2.3 mg/dL 2.0   Albumin Latest Ref Range: 3.4 - 5.0 g/dL 4.0   Protein Total Latest Ref Range: 6.8 - 8.8 g/dL 7.1   Bilirubin Total Latest Ref Range: 0.2 - 1.3 mg/dL 0.5   Alkaline Phosphatase Latest Ref Range: 40 - 150 U/L 64   ALT Latest Ref Range: 0 - 70 U/L 27   AST Latest Ref Range: 0 - 45 U/L 16   Bilirubin Direct Latest Ref Range: 0.0 - 0.2 mg/dL 0.1   Glucose Latest Ref Range: 70 - 99 mg/dL 99   WBC Latest Ref Range: 4.0 - 11.0 10e9/L 4.9   Hemoglobin Latest Ref Range: 13.3 - 17.7 g/dL 16.1   Hematocrit Latest Ref Range: 40.0 - 53.0 % 47.6   Platelet Count Latest Ref Range: 150 - 450 10e9/L 137 (L)   RBC Count Latest Ref Range: 4.4 - 5.9 10e12/L 5.43   MCV Latest Ref Range: 78 - 100 fl 88   MCH Latest Ref Range: 26.5 - 33.0 pg 29.7   MCHC Latest Ref Range: 31.5 - 36.5 g/dL 33.8   RDW Latest Ref Range: 10.0 - 15.0 % 13.0   Tacrolimus Last Dose Unknown 03.09.2021 1120PM   Tacrolimus Level Latest Ref Range: 5.0 - 15.0 ug/L 4.1 (L)           A/P: 39 yo man with history of PTLD dx in 2017 treated with Rituxan and then R-COEP X 4 with CR.     1. History of PTLD in ongoing remission almost 4 years out from chemo completion. Discussed that going forward we do clinical exams only and scan based on symptoms. Last imaging in 7/2019 without evidence of disease.    Discussed a plan for annual visit through 2022. Then can consider discharge from clinic at that point.       2. Renal: creatinine stable  - stable immunosuppression with good Tac level     3.ID: no  current infectious issues  - Getting COVID vaccine within the next week     4. Papillary Thyroid Cancer Status post resection: on synthroid. Follows with endocrine     Final Plan:  - [facility] onc visit and labs in 1 year    Olena Crenshaw MD

## 2021-03-11 NOTE — NURSING NOTE
Chief Complaint   Patient presents with     Lab Only     vitals taken, checked into next appointment.     Vitals taken only.  No lab orders.    Diamond Hassan MA

## 2021-03-11 NOTE — LETTER
3/11/2021         RE: Crciket Chen  [address]  [facility] [id number]        Dear Colleague,    Thank you for referring your patient, Cricket Chen, to the [facility] [place]. Please see a copy of my visit note below.        [facility] Clinic Visit  Mar 11, 2021    Reason for Visit: follow-up PTLD      Disease and Transplant History:  1. 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   -- CellCept was reduced from 1500 b.i.d. to 250 twice a day, and  tacrolimus 3 mg twice a day was decreased to 2 mg twice a day.   2. He tolerated the RSST well and completed 4 weekly infusions of rituximab followed by R-COEP x4 (last cycle completed on 6/7/2017).   -- Post Treatment PET/CT scan and he had achieved CR1. Notably, his diagnostic PET/CT scan identified a thyroid nodule, which was consistent with papillary thyroid carcinoma based on the ultrasound-guided fine needle aspiration and he underwent thyroidectomy.    -- Repeat CT chest, abdomen and pelvis on 7/8/2019 did not show any evidence of active PTLD.  3. Observation        HPI: Isidro presents for follow-up today for his history of PTLD. We did a video visit 6 months ago and he comes in today for in person follow-up. Today he notes that he is doing well overall. Has had a couple of episodes of palpitations and seen in the ED. Wore a heart monitor for a couple weeks and hasn't heard of any issues. No other concerns. No fevers or chills, no chest pain or SOB, no night sweats, no weight loss, no new lumps or bumps.      10 point ROS otherwise negative     Physical Exam:  BP (!) 143/89   Pulse 72   Temp 98.7  F (37.1  C) (Oral)   Resp 17   Wt 96.8 kg (213 lb 6.4 oz)   SpO2 98%   BMI 30.62 kg/m    Gen: Well appearing  HEENT: no icterus or injection, no palpable lymph  nodes  Lungs: CTAB no crackles or wheezes  CV: RRR no r/m/g  Abd: Soft, NT/ND  Ext: no edema        Labs:  Results for IJEOMA DOBSON (MRN [id number]) as of 3/11/2021 14:06   Ref. Range 3/10/2021 11:17   Sodium Latest Ref Range: 133 - 144 mmol/L 140   Potassium Latest Ref Range: 3.4 - 5.3 mmol/L 4.0   Chloride Latest Ref Range: 94 - 109 mmol/L 110 (H)   Carbon Dioxide Latest Ref Range: 20 - 32 mmol/L 25   Urea Nitrogen Latest Ref Range: 7 - 30 mg/dL 17   Creatinine Latest Ref Range: 0.66 - 1.25 mg/dL 1.20   GFR Estimate Latest Ref Range: >60 mL/min/1.73_m2 75   GFR Estimate If Black Latest Ref Range: >60 mL/min/1.73_m2 87   Calcium Latest Ref Range: 8.5 - 10.1 mg/dL 9.2   Anion Gap Latest Ref Range: 3 - 14 mmol/L 5   Magnesium Latest Ref Range: 1.6 - 2.3 mg/dL 2.0   Albumin Latest Ref Range: 3.4 - 5.0 g/dL 4.0   Protein Total Latest Ref Range: 6.8 - 8.8 g/dL 7.1   Bilirubin Total Latest Ref Range: 0.2 - 1.3 mg/dL 0.5   Alkaline Phosphatase Latest Ref Range: 40 - 150 U/L 64   ALT Latest Ref Range: 0 - 70 U/L 27   AST Latest Ref Range: 0 - 45 U/L 16   Bilirubin Direct Latest Ref Range: 0.0 - 0.2 mg/dL 0.1   Glucose Latest Ref Range: 70 - 99 mg/dL 99   WBC Latest Ref Range: 4.0 - 11.0 10e9/L 4.9   Hemoglobin Latest Ref Range: 13.3 - 17.7 g/dL 16.1   Hematocrit Latest Ref Range: 40.0 - 53.0 % 47.6   Platelet Count Latest Ref Range: 150 - 450 10e9/L 137 (L)   RBC Count Latest Ref Range: 4.4 - 5.9 10e12/L 5.43   MCV Latest Ref Range: 78 - 100 fl 88   MCH Latest Ref Range: 26.5 - 33.0 pg 29.7   MCHC Latest Ref Range: 31.5 - 36.5 g/dL 33.8   RDW Latest Ref Range: 10.0 - 15.0 % 13.0   Tacrolimus Last Dose Unknown 03.09.2021 1120PM   Tacrolimus Level Latest Ref Range: 5.0 - 15.0 ug/L 4.1 (L)           A/P: 41 yo man with history of PTLD dx in 2017 treated with Rituxan and then R-COEP X 4 with CR.     1. History of PTLD in ongoing remission almost 4 years out from chemo completion. Discussed that going forward we do  clinical exams only and scan based on symptoms. Last imaging in 7/2019 without evidence of disease.    Discussed a plan for annual visit through 2022. Then can consider discharge from clinic at that point.       2. Renal: creatinine stable  - stable immunosuppression with good Tac level     3.ID: no current infectious issues  - Getting COVID vaccine within the next week     4. Papillary Thyroid Cancer Status post resection: on synthroid. Follows with endocrine     Final Plan:  - [facility] onc visit and labs in 1 year    Olena Crenshaw MD

## 2021-03-11 NOTE — NURSING NOTE
"Oncology Rooming Note    March 11, 2021 2:04 PM   Cricket Chen is a 40 year old male who presents for:    Chief Complaint   Patient presents with     Lab Only     vitals taken, checked into next appointment.     Oncology Clinic Visit     PTLD     Initial Vitals: BP (!) 143/89   Pulse 72   Temp 98.7  F (37.1  C) (Oral)   Resp 17   Wt 96.8 kg (213 lb 6.4 oz)   SpO2 98%   BMI 30.62 kg/m   Estimated body mass index is 30.62 kg/m  as calculated from the following:    Height as of 10/2/20: 1.778 m (5' 10\").    Weight as of this encounter: 96.8 kg (213 lb 6.4 oz). Body surface area is 2.19 meters squared.  No Pain (0) Comment: Data Unavailable   No LMP for male patient.  Allergies reviewed: Yes  Medications reviewed: Yes    Medications: Medication refills not needed today.  Pharmacy name entered into [facility]:    [facility] - [facility] - [place] - [address]  [facility] - [place], [place] - [address]    Clinical concerns: none       Eugenia Logan CMA            "

## 2021-03-15 ENCOUNTER — IMMUNIZATION (OUTPATIENT)
Dept: NURSING | Facility: CLINIC | Age: 41
End: 2021-03-15
Payer: COMMERCIAL

## 2021-03-15 PROCEDURE — 0001A PR COVID VAC PFIZER DIL RECON 30 MCG/0.3 ML IM: CPT

## 2021-03-15 PROCEDURE — 91300 PR COVID VAC PFIZER DIL RECON 30 MCG/0.3 ML IM: CPT

## 2021-04-03 ENCOUNTER — HEALTH MAINTENANCE LETTER (OUTPATIENT)
Age: 41
End: 2021-04-03

## 2021-04-05 ENCOUNTER — IMMUNIZATION (OUTPATIENT)
Dept: NURSING | Facility: CLINIC | Age: 41
End: 2021-04-05
Attending: FAMILY MEDICINE
Payer: COMMERCIAL

## 2021-04-05 DIAGNOSIS — Z94.4 LIVER REPLACED BY TRANSPLANT (H): ICD-10-CM

## 2021-04-05 DIAGNOSIS — Z94.0 S/P KIDNEY TRANSPLANT: ICD-10-CM

## 2021-04-05 PROCEDURE — 91300 PR COVID VAC PFIZER DIL RECON 30 MCG/0.3 ML IM: CPT

## 2021-04-05 PROCEDURE — 0002A PR COVID VAC PFIZER DIL RECON 30 MCG/0.3 ML IM: CPT

## 2021-04-05 RX ORDER — TACROLIMUS 0.5 MG/1
CAPSULE ORAL
Qty: 90 CAPSULE | Refills: 3 | Status: SHIPPED | OUTPATIENT
Start: 2021-04-05 | End: 2022-04-04

## 2021-04-05 RX ORDER — TACROLIMUS 1 MG/1
CAPSULE ORAL
Qty: 180 CAPSULE | Refills: 3 | Status: SHIPPED | OUTPATIENT
Start: 2021-04-05 | End: 2022-04-04

## 2021-04-08 DIAGNOSIS — Z79.60 LONG-TERM USE OF IMMUNOSUPPRESSANT MEDICATION: ICD-10-CM

## 2021-04-08 DIAGNOSIS — Z94.0 KIDNEY TRANSPLANT RECIPIENT: ICD-10-CM

## 2021-04-08 RX ORDER — MYCOPHENOLATE MOFETIL 250 MG/1
500 CAPSULE ORAL 2 TIMES DAILY
Qty: 120 CAPSULE | Refills: 11 | Status: SHIPPED | OUTPATIENT
Start: 2021-04-08 | End: 2022-04-04

## 2021-04-29 ENCOUNTER — TELEPHONE (OUTPATIENT)
Dept: TRANSPLANT | Facility: CLINIC | Age: 41
End: 2021-04-29

## 2021-04-29 DIAGNOSIS — I48.92 ATRIAL FLUTTER, UNSPECIFIED TYPE (H): ICD-10-CM

## 2021-04-29 DIAGNOSIS — C73 PAPILLARY THYROID CARCINOMA (H): ICD-10-CM

## 2021-04-29 DIAGNOSIS — E89.0 POSTOPERATIVE HYPOTHYROIDISM: ICD-10-CM

## 2021-04-29 RX ORDER — LEVOTHYROXINE SODIUM 137 UG/1
TABLET ORAL
Qty: 32 TABLET | Refills: 0 | Status: SHIPPED | OUTPATIENT
Start: 2021-04-29 | End: 2021-05-14

## 2021-04-29 NOTE — TELEPHONE ENCOUNTER
April 29, 2021 11:08 AM -  AIVERSE1: pt called back after vm to [facility] liver follow up per cord 5/11/phone

## 2021-04-30 NOTE — TELEPHONE ENCOUNTER
ELIQUIS 5MG TABS    Last Written Prescription Date:  5/7/2020  Last Fill Quantity: 180,   # refills: 3  Last Office Visit : 3/13/2020  Future Office visit:  None    Routing refill request to provider for review/approval because:  Would Provider like updated visit and labs on file for this medication?  Refer to clinic for review       Netta Watts RN  Central Triage Red Flags/Med Refills

## 2021-05-05 ENCOUNTER — TELEPHONE (OUTPATIENT)
Dept: CARDIOLOGY | Facility: CLINIC | Age: 41
End: 2021-05-05

## 2021-05-05 NOTE — TELEPHONE ENCOUNTER
[facility] Call Center    Phone Message    May a detailed message be left on voicemail: yes     Reason for Call: Medication Refill Request    Has the patient contacted the pharmacy for the refill? Yes   Name of medication being requested: apixaban ANTICOAGULANT (ELIQUIS ANTICOAGULANT) 5 MG tablet [[id number]]  Provider who prescribed the medication: Dr. George  Pharmacy: [facility] - [place] [address]   Date medication is needed: ASAP         Action Taken: Message routed to:  [facility] ([facility]): Cardio    Travel Screening: Not Applicable

## 2021-05-06 DIAGNOSIS — I48.92 ATRIAL FLUTTER, UNSPECIFIED TYPE (H): ICD-10-CM

## 2021-05-06 DIAGNOSIS — Z94.4 LIVER REPLACED BY TRANSPLANT (H): ICD-10-CM

## 2021-05-06 DIAGNOSIS — Q20.3 COMPLETE TRANSPOSITION OF GREAT VESSELS: ICD-10-CM

## 2021-05-06 DIAGNOSIS — D47.Z1 POST-TRANSPLANT LYMPHOPROLIFERATIVE DISORDER (H): ICD-10-CM

## 2021-05-06 DIAGNOSIS — Z13.220 LIPID SCREENING: ICD-10-CM

## 2021-05-06 LAB
ALBUMIN SERPL-MCNC: 4.3 G/DL (ref 3.4–5)
ALP SERPL-CCNC: 72 U/L (ref 40–150)
ALT SERPL W P-5'-P-CCNC: 34 U/L (ref 0–70)
ANION GAP SERPL CALCULATED.3IONS-SCNC: 5 MMOL/L (ref 3–14)
AST SERPL W P-5'-P-CCNC: 15 U/L (ref 0–45)
BASOPHILS # BLD AUTO: 0 10E9/L (ref 0–0.2)
BASOPHILS NFR BLD AUTO: 0.2 %
BILIRUB DIRECT SERPL-MCNC: 0.2 MG/DL (ref 0–0.2)
BILIRUB SERPL-MCNC: 0.6 MG/DL (ref 0.2–1.3)
BUN SERPL-MCNC: 21 MG/DL (ref 7–30)
CALCIUM SERPL-MCNC: 9.6 MG/DL (ref 8.5–10.1)
CHLORIDE SERPL-SCNC: 105 MMOL/L (ref 94–109)
CO2 SERPL-SCNC: 28 MMOL/L (ref 20–32)
CREAT SERPL-MCNC: 1.46 MG/DL (ref 0.66–1.25)
DIFFERENTIAL METHOD BLD: NORMAL
EOSINOPHIL # BLD AUTO: 0.1 10E9/L (ref 0–0.7)
EOSINOPHIL NFR BLD AUTO: 1.8 %
ERYTHROCYTE [DISTWIDTH] IN BLOOD BY AUTOMATED COUNT: 13.5 % (ref 10–15)
GFR SERPL CREATININE-BSD FRML MDRD: 59 ML/MIN/{1.73_M2}
GLUCOSE SERPL-MCNC: 107 MG/DL (ref 70–99)
HCT VFR BLD AUTO: 49.6 % (ref 40–53)
HGB BLD-MCNC: 17.1 G/DL (ref 13.3–17.7)
LYMPHOCYTES # BLD AUTO: 1 10E9/L (ref 0.8–5.3)
LYMPHOCYTES NFR BLD AUTO: 21.2 %
MAGNESIUM SERPL-MCNC: 1.8 MG/DL (ref 1.6–2.3)
MCH RBC QN AUTO: 30.3 PG (ref 26.5–33)
MCHC RBC AUTO-ENTMCNC: 34.5 G/DL (ref 31.5–36.5)
MCV RBC AUTO: 88 FL (ref 78–100)
MONOCYTES # BLD AUTO: 0.5 10E9/L (ref 0–1.3)
MONOCYTES NFR BLD AUTO: 10 %
NEUTROPHILS # BLD AUTO: 3.3 10E9/L (ref 1.6–8.3)
NEUTROPHILS NFR BLD AUTO: 66.8 %
PLATELET # BLD AUTO: 162 10E9/L (ref 150–450)
POTASSIUM SERPL-SCNC: 4.5 MMOL/L (ref 3.4–5.3)
PROT SERPL-MCNC: 7.9 G/DL (ref 6.8–8.8)
RBC # BLD AUTO: 5.65 10E12/L (ref 4.4–5.9)
SODIUM SERPL-SCNC: 138 MMOL/L (ref 133–144)
TACROLIMUS BLD-MCNC: 4.1 UG/L (ref 5–15)
TME LAST DOSE: ABNORMAL H
WBC # BLD AUTO: 4.9 10E9/L (ref 4–11)

## 2021-05-06 PROCEDURE — 36415 COLL VENOUS BLD VENIPUNCTURE: CPT | Performed by: INTERNAL MEDICINE

## 2021-05-06 PROCEDURE — 83735 ASSAY OF MAGNESIUM: CPT | Performed by: INTERNAL MEDICINE

## 2021-05-06 PROCEDURE — 80076 HEPATIC FUNCTION PANEL: CPT | Performed by: INTERNAL MEDICINE

## 2021-05-06 PROCEDURE — 85025 COMPLETE CBC W/AUTO DIFF WBC: CPT | Performed by: INTERNAL MEDICINE

## 2021-05-06 PROCEDURE — 80197 ASSAY OF TACROLIMUS: CPT | Performed by: INTERNAL MEDICINE

## 2021-05-06 PROCEDURE — 80048 BASIC METABOLIC PNL TOTAL CA: CPT | Performed by: INTERNAL MEDICINE

## 2021-05-11 ENCOUNTER — VIRTUAL VISIT (OUTPATIENT)
Dept: GASTROENTEROLOGY | Facility: CLINIC | Age: 41
End: 2021-05-11
Attending: INTERNAL MEDICINE
Payer: COMMERCIAL

## 2021-05-11 DIAGNOSIS — N18.31 STAGE 3A CHRONIC KIDNEY DISEASE (H): ICD-10-CM

## 2021-05-11 DIAGNOSIS — Z94.4 LIVER REPLACED BY TRANSPLANT (H): Primary | ICD-10-CM

## 2021-05-11 PROBLEM — N18.30 CHRONIC KIDNEY DISEASE, STAGE 3 (H): Status: ACTIVE | Noted: 2021-05-11

## 2021-05-11 PROCEDURE — 99213 OFFICE O/P EST LOW 20 MIN: CPT | Mod: 95 | Performed by: INTERNAL MEDICINE

## 2021-05-11 ASSESSMENT — PAIN SCALES - GENERAL: PAINLEVEL: NO PAIN (0)

## 2021-05-11 NOTE — LETTER
[facility]        NAME: Thai Parker is a 55 y o  female  : 1975    MRN: [id number]  DATE: May 11, 2021  TIME: 1:00 PM    Assessment and Plan   Bronchitis [J40]  1  Bronchitis  No[facility] Coron[facility]Covid-19),[facility]    albuterol (ProAir HFA) 90 mcg/act inhaler    benzonatate (TESSALON PERLES) 100 mg capsule         Patient Instructions     Patient Instructions     Hydration and rest  Tylenol and motrin for fever  mucinex OTC  Humidifier at night  PCP follow up  Go to ER with difficulty breathing  COVID testing  Home isolation  Acute Bronchitis   WHAT YOU NEED TO KNOW:   Acute bronchitis is swelling and irritation in your lungs  It is usually caused by a virus and most often happens in the winter  Bronchitis may also be caused by bacteria or by a chemical irritant, such as smoke  DISCHARGE INSTRUCTIONS:   Return to the emergency department if:   · You cough up blood  · Your lips or fingernails turn blue  · You feel like you are not getting enough air when you breathe  Call your doctor if:   · Your symptoms do not go away or get worse, even after treatment  · Your cough does not get better within 4 weeks  · You have questions or concerns about your condition or care  Medicines: You may  need any of the following:  · Cough suppressants  decrease your urge to cough  · Decongestants  help loosen mucus in your lungs and make it easier to cough up  This can help you breathe easier  · Inhalers  may be given  Your healthcare provider may give you one or more inhalers to help you breathe easier and cough less  An inhaler gives your medicine to open your airways  Ask your healthcare provider to show you how to use your inhaler correctly  · Acetaminophen  decreases pain and fever  It is available without a doctor's order  Ask how much to take and how often to take it  Follow directions   Read the labels of all other medicines you are using to see if 8/2/2018       RE: Cricket Chen  [address]  [facility] [address]     Dear Colleague,    Thank you for referring your patient, Cricket Chen, to the [facility]ASCULAR [facility] at [facility] acute hospital. Please see a copy of my visit note below.    VASCULAR SURGERY CLINIC NOTE    HPI:    Pt is a 37 year old year with a past medical history significant for hepatorenal syndrome secondary to alcoholic cirrhosis s/p liver/kidney transplant. He had a RUE brachiocephalic fistula ligated.    Subjective:   He continues to have a lump in his R antecubital fossa. This was worked up by a plastic surgeon with an ultrasound that showed arterial flow suggesting a psuedoaneurysm. He denies any pain or increase in size. His R hand has normal strength. He has no other complaints.      Patient Active Problem List   Diagnosis     Atrial flutter (H)     Complete transposition of great vessels     Esophageal varices (H)     Insomnia     Alcoholic hepatitis     History of alcohol abuse     History of transposition of great vessels     Depression     Thrombocytopenia (H)     Pain medication agreement     [facility]     Liver replaced by transplant (H)     Kidney replaced by transplant     Immunosuppressed status (H)     Hypomagnesemia     Secondary renal hyperparathyroidism (H)     Chronic pain syndrome     Pain management contract signed     Aftercare following organ transplant     Post-transplant lymphoproliferative disorder (H)     Papillary thyroid carcinoma (H)     Advance directive discussed with patient     Acute alcoholic liver disease     Alcohol dependence (H)     Encounter for palliative care     Hypertension secondary to other renal disorders     Atrial fibrillation (H)     Postoperative hypothyroidism     Arm mass, right       Objective:  Vital Signs:  /73 (BP Location: Left arm)  Pulse 80  Resp 16  SpO2  99%    Prior to Admission medications    Medication Sig Start Date End Date Taking? Authorizing Provider   amLODIPine (NORVASC) 10 MG tablet TAKE ONE TABLET BY MOUTH EVERY DAY 9/5/17   Jake Sidhu MD   apixaban ANTICOAGULANT (ELIQUIS) 5 MG tablet Take 1 tablet (5 mg) by mouth 2 times daily 3/30/18   Francesca Alba MD   ketoconazole (NIZORAL) 2 % cream Twice daily to areas of rash on face 8/2/18   Yuval Dobbins MD   ketoconazole (NIZORAL) 2 % shampoo Use as a foaming face wash in shower daily 8/2/18   Yuval Dobbins MD   levothyroxine (SYNTHROID/LEVOTHROID) 137 MCG tablet Take 1 tablet (137 mcg) by mouth daily 4/4/18   Zuleyma Gray MD   lisinopril (PRINIVIL/ZESTRIL) 5 MG tablet TAKE ONE TABLET BY MOUTH EVERY DAY 9/5/17   Jake Sidhu MD   magnesium oxide (MAG-OX) 400 (241.3 MG) MG tablet Take 1 tablet (400 mg) by mouth 2 times daily 7/21/17   Laureen Galvan MD   metoprolol succinate (TOPROL-XL) 25 MG 24 hr tablet Take 1 tablet (25 mg) by mouth daily 4/13/18   Jaylen George MD   mycophenolate (GENERIC EQUIVALENT) 250 MG capsule Take 1 capsule (250 mg) by mouth 2 times daily 12/18/17   Jake Sidhu MD   predniSONE (DELTASONE) 5 MG tablet TAKE ONE TABLET BY MOUTH EVERY DAY 7/16/18   Jake Sidhu MD   sulfamethoxazole-trimethoprim (BACTRIM/SEPTRA) 400-80 MG per tablet Take 1 tablet by mouth daily 3/1/18   Laureen Galvan MD   tacrolimus (GENERIC EQUIVALENT) 0.5 MG capsule Take 1 capsule (0.5 mg) by mouth 2 times daily 1.5 mg twice per day. 2/12/18   Jake Sidhu MD   tacrolimus (GENERIC EQUIVALENT) 1 MG capsule Take 1 capsule (1 mg) by mouth 2 times daily 1.5 mg twice per day. 2/12/18   Jake Sidhu MD       PHYSICAL EXAM:  General: The patient is alert and oriented.  Moves all extremities.   HEENT: Color appropriate for race, warm, dry  Heart:: RRR  Lungs: Breathing unlabored    GI: Bowel sounds present.   they also contain acetaminophen, or ask your doctor or pharmacist  Acetaminophen can cause liver damage if not taken correctly  Do not use more than 4 grams (4,000 milligrams) total of acetaminophen in one day  · NSAIDs  help decrease swelling and pain or fever  This medicine is available with or without a doctor's order  NSAIDs can cause stomach bleeding or kidney problems in certain people  If you take blood thinner medicine, always ask your healthcare provider if NSAIDs are safe for you  Always read the medicine label and follow directions  · Take your medicine as directed  Contact your healthcare provider if you think your medicine is not helping or if you have side effects  Tell him of her if you are allergic to any medicine  Keep a list of the medicines, vitamins, and herbs you take  Include the amounts, and when and why you take them  Bring the list or the pill bottles to follow-up visits  Carry your medicine list with you in case of an emergency  Self-care:   · Drink liquids as directed  You may need to drink more liquids than usual to stay hydrated  Ask how much liquid to drink each day and which liquids are best for you  · Use a cool mist humidifier  to increase air moisture in your home  This may make it easier for you to breathe and help decrease your cough  · Get more rest   Rest helps your body to heal  Slowly start to do more each day  Rest when you feel it is needed  · Avoid irritants in the air  Avoid chemicals, fumes, and dust  Wear a face mask if you must work around dust or fumes  Stay inside on days when air pollution levels are high  If you have allergies, stay inside when pollen counts are high  Do not use aerosol products, such as spray-on deodorant, bug spray, and hair spray  · Do not smoke or be around others who are smoking  Nicotine and other chemicals in cigarettes and cigars can cause lung damage   Ask your healthcare provider for information if you currently Abdomen soft, nontender to light palpation.  EXTREMITIES: Skin over the extremities warm & pink. No edema noted.  RUE antecubital fossa with 6 cm x 3 cm mass without pulsatility  PULSES: B/L radial/ulnar pulses palpable  Neurologic: Grossly intact, EOMs grossly intact    Imaging:    EXAMINATION: Ultrasound extremity arterial, venous dialysis access  graft, 7/25/2018 6:04 PM      COMPARISON: 7/8/2016     HISTORY: Arm mass, AV fistula     FINDINGS: There is a 3.8 x 3.3 x 7.0 cm echogenic, heterogeneous  well-circumscribed rounded area in the area of the palpable lump over  the right AC fossa. There is a vessel feeding the inferior aspect of  the lesion with arterial waveforms.     The right brachial artery is patent above and below the antecubital  fossa with normal triphasic waveforms.            IMPRESSION:  Likely 7 cm pseudoaneurysm in the right AC fossa, with the majority  containing old, clotted blood products, though there continues to be a  feeding artery.         Assessment:  37M with R brachial artery pseudoaneurysm.    Plan:  Pseudoaneurysm will have to be repaired operatively possibly with a venous patch. The procedure has been discussed with the patient and he would like to proceed. We will hold his Eliquis 2 days prior.         Sobeida Norwood MD  Vascular Surgery Fellow  [facility]         Attestation  I have seen and examined this patient with , and I agree with her findings and assessment. Briefly, this 36 YO WM liver and renal transplant recipient has a large mass in the right antecubital space near the proximal anastomosis of an aneurysmal AV fistula that was ligated. Ultrasound demonstrates flow in the mass, c/w a pseudoaneurysm. We plan to resect the large mass under general anesthesia and to repair or replace the affected artery with saphenous vein.    Again, thank you for allowing me to participate in the care of your patient.      Sincerely,    John Payton MD       smoke and need help to quit  E-cigarettes or smokeless tobacco still contain nicotine  Talk to your healthcare provider before you use these products  Prevent acute bronchitis:       · Get the vaccinations you need  Ask your healthcare provider if you should get the flu or pneumonia vaccines  · Prevent the spread of germs  You can decrease your risk for acute bronchitis and other illnesses by doing the following:     ? Wash your hands often with soap and water  Carry germ-killing hand lotion or gel with you  You can use the lotion or gel to clean your hands when soap and water are not available  ? Do not touch your eyes, nose, or mouth unless you have washed your hands first     ? Always cover your mouth when you cough to prevent the spread of germs  It is best to cough into a tissue or your shirt sleeve instead of into your hand  Ask those around you to cover their mouths when they cough  ? Try to avoid people who have a cold or the flu  If you are sick, stay away from others as much as possible  Follow up with your doctor as directed:  Write down questions you have so you will remember to ask them during your follow-up visits  © Copyright [address] [place] [address] Drive Information is for End User's use only and may not be sold, redistributed or otherwise used for commercial purposes  All illustrations and images included in CareNotes® are the copyrighted property of A D A M , Inc  or [facility] Shekhar Armijo   The above information is an  only  It is not intended as medical advice for individual conditions or treatments  Talk to your doctor, nurse or pharmacist before following any medical regimen to see if it is safe and effective for you  **Portions of the record may have been created with voice recognition software  Occasional wrong word or "sound a like" substitutions may have occurred due to the inherent limitations of voice recognition software    Read the chart carefully and recognize, using context, where substitutions have occurred  **     Chief Complaint     Chief Complaint   Patient presents with    Headache     body aches, cough x 2 days         History of Present Illness     77-year-old female presents clinic with complaints of cough, body aches, fever and headache x2 days  Reports some nasal congestion, ear pain and sore throat  Denies any shortness of breath, chest pain, loss of taste or smell, nausea vomiting or diarrhea  No known COVID-19 contacts  No recent travel  Review of Systems     Review of Systems   Constitutional: Positive for appetite change and fever  Negative for fatigue  HENT: Positive for congestion, ear pain, rhinorrhea and sore throat  Respiratory: Positive for cough  Negative for shortness of breath and wheezing  Cardiovascular: Negative for chest pain  Gastrointestinal: Negative for diarrhea, nausea and vomiting  Musculoskeletal: Positive for myalgias  Skin: Negative for rash  Neurological: Positive for headaches           Current Medications     Current Outpatient Medications:     albuterol (ProAir HFA) 90 mcg/act inhaler, Inhale 2 puffs every 6 (six) hours as needed for wheezing, Disp: 8 5 g, Rfl: 0    amoxicillin (AMOXIL) 500 mg capsule, TAKE ONE CAPSULE BY MOUTH 3 TIMES A DAY FOR 10 DAYS, Disp: , Rfl:     benzonatate (TESSALON PERLES) 100 mg capsule, Take 1 capsule (100 mg total) by mouth 3 (three) times a day as needed for cough for up to 5 days, Disp: 18 capsule, Rfl: 0    dicyclomine (BENTYL) 10 mg capsule, Take 1 capsule (10 mg total) by mouth every 6 (six) hours as needed (abdominal cramping, pain) (Patient not taking: Reported on 4/6/2021), Disp: 40 capsule, Rfl: 0    Dulera 100-5 MCG/ACT inhaler, INHALE 2 PUFF(S) TWICE A DAY BY INHALATION ROUTE , Disp: , Rfl:     furosemide (LASIX) 40 mg tablet, Take 40 mg by mouth daily as needed, Disp: , Rfl:     ibuprofen (MOTRIN) 600 mg tablet, Take 600 mg by mouth every 6 (six) hours as needed, Disp: , Rfl:     Myrbetriq 25 MG TB24, Take 25 mg by mouth daily, Disp: , Rfl:     oxybutynin (DITROPAN) 5 mg tablet, TAKE 1 TABLET(S) TWICE A DAY BY ORAL ROUTE , Disp: , Rfl:     penicillin V potassium (VEETID) 500 mg tablet, TAKE 2 TABLETS BY MOUTH TO START AND THEN TAKE ONE TABLET BY MOUTH EVERY SIX HOURS  START 2 DAYS BEFORE EXTRACTIONS, Disp: , Rfl:     Phenir-PE-Sod Sal-Caff Cit (COUGH/COLD MEDICINE PO), Take by mouth, Disp: , Rfl:     promethazine-dextromethorphan (PHENERGAN-DM) 6 25-15 mg/5 mL oral syrup, TAKE 5 ML BY MOUTH TWICE DAILY AS NEEDED , Disp: , Rfl:     Current Allergies     Allergies as of 05/11/2021    (No Known Allergies)            The following portions of the patient's history were reviewed and updated as appropriate: allergies, current medications, past family history, past medical history, past social history, past surgical history and problem list      Past Medical History:   Diagnosis Date    Gastritis     Hyperlipidemia        Past Surgical History:   Procedure Laterality Date    KS ESOPHAGOGASTRODUODENOSCOPY TRANSORAL DIAGNOSTIC N/A 1/31/2019    Procedure: ESOPHAGOGASTRODUODENOSCOPY (EGD); Surgeon: Galo Aragon MD;  Location: [facility]; Service: Gastroenterology    TOE SURGERY Right     TUBAL LIGATION      TUBAL LIGATION         Family History   Problem Relation Age of Onset    No Known Problems Mother     No Known Problems Father          Medications have been verified  Objective     Pulse 88   Temp 97 8 °F (36 6 °C)   Resp 20   Wt 54 9 kg (121 lb)   LMP 04/28/2021 (Approximate)   SpO2 96%   Breastfeeding No Comment: Tubal ligation  BMI 24 44 kg/m²        Physical Exam     Physical Exam  Vitals signs and nursing note reviewed  Constitutional:       General: She is not in acute distress  Appearance: Normal appearance  She is not ill-appearing or diaphoretic  HENT:      Head: Normocephalic and atraumatic        Right Ear: Tympanic membrane and ear canal normal       Left Ear: Tympanic membrane and ear canal normal       Nose: Congestion and rhinorrhea present  Mouth/Throat:      Pharynx: Posterior oropharyngeal erythema present  Cardiovascular:      Rate and Rhythm: Normal rate and regular rhythm  Pulmonary:      Effort: Pulmonary effort is normal  No respiratory distress  Breath sounds: Normal breath sounds  No wheezing, rhonchi or rales  Skin:     General: Skin is warm and dry  Findings: No rash  Neurological:      Mental Status: She is alert

## 2021-05-11 NOTE — PROGRESS NOTES
[facility]     A/P  Mr. Chen is a 40 Y M s/p LKT 5/2016 for ETOH.    IS per Dr. Sidhu. No changes to medications today. Continue monitoring labs and IS level every 3 months to assess for appropriate dosing and side effects from IS including BOUBACAR, pancytopenia, hyperkalemia, hypomagnesemia.    Graft function Liver doing very well.    Post transplant course c/b PTLD and thyroid ca. Oncology monitoring. Recently saw Dr. Crenshaw    Discussed Covid vaccination and antibodies at length. Discussed emerging data and no conclusions at this time.  Will see back in 1 year     Laureen Galvan MD  Hepatology/ Liver Transplant  [facility]  ===================================================================  PCP: Dr. David Moser     SUBJECTIVE  Mr. Chen is a 40 Y M s/p DDLKT 5/2016 ETOH.  He is doing well.     Up to date with his visits with various doctors.  Feeling well. Has had a quiet year, [employer]atonicwise.  Had covid vaccine x2.     EXPLANT: No HCC  IS: Prograf 3-5 and MMF, pred 5 (per Dr. Sidhu)  LABS: Up to date, excellent liver function.    Lab Test 12/03/19  1113   PROTTOTAL 6.9   ALBUMIN 4.0   BILITOTAL 0.4   ALKPHOS 76   AST 22   ALT 39     REJECTION: None.  BILIARY ISSUES: None  STENT: Out  KIDNEY FUNCTION:   Creatinine   Date Value Ref Range Status   05/06/2021 1.46 (H) 0.66 - 1.25 mg/dL Final     BP: Good. Lisinopril, amlodipine  PREV:  Derm 10/2018. Flu shot done this year.  DISEASE RECURRENCE: Sober since prior to transplant  OTHER ISSUES:   PTLD in past. Recent imaging neg. Follows with oncology  Thyroid cancer, s/p thyroidectomy last surveillance July negative  Weight gain.   Had fistula out.        Exam  Gen Alert pleasant NAD  Resp No difficulty breathing. No cough  Skin No Jaundice  Eyes No icterus  Neuro FIGUEREDO  MSK no muscle wasting  Psyche Pleasant, appropriate. Well groomed.  Cricket Chen is a 40 year old male who is being evaluated via a  "billable video visit.      The patient has been notified of following:   \"This video visit will be conducted via a call between you and your physician/provider. We have found that certain health care needs can be provided without the need for an in-person physical exam.  This service lets us provide the care you need with a video conversation.  If a prescription is necessary we can send it directly to your pharmacy.  If lab work is needed we can place an order for that and you can then stop by our lab to have the test done at a later time. Video visits are billed at different rates depending on your insurance coverage.  Please reach out to your insurance provider with any questions.  If during the course of the call the physician/provider feels a video visit is not appropriate, you will not be charged for this service.\"   Patient has given verbal consent for Video visit? Yes    Type of service:  Video Visit  Video Start Time 1134  Video End Time 1154  Patient location: home  Will anyone else be joining your video visit? no  {If patient encounters technical issues they should call [phone] :1  Distant Location (provider location):  [facility] HEPATOLOGY CLINIC [place]   Mode of Communication:  Video Conference via [employer]  I have reviewed and updated the patient's Past Medical History, Social History, Family History and Medication List.            "

## 2021-05-11 NOTE — LETTER
5/11/2021         RE: Cricket Chen  [address]  [facility] [address]        Dear Colleague,    Thank you for referring your patient, Cricket Chen, to the [facility]PAT[facility] [place]. Please see a copy of my visit note below.    [facility]  LIVER TRANSPLANT CLINIC     A/P  Mr. Chen is a 40 Y M s/p LKT 5/2016 for ETOH.    IS per Dr. Sidhu. No changes to medications today. Continue monitoring labs and IS level every 3 months to assess for appropriate dosing and side effects from IS including BOUBACAR, pancytopenia, hyperkalemia, hypomagnesemia.    Graft function Liver doing very well.    Post transplant course c/b PTLD and thyroid ca. Oncology monitoring. Recently saw Dr. Crenshaw    Discussed Covid vaccination and antibodies at length. Discussed emerging data and no conclusions at this time.  Will see back in 1 year     Laureen Galvan MD  Hepatology/ Liver Transplant  [facility]  ===================================================================  PCP: Dr. David Moser     SUBJECTIVE  Mr. Chen is a 40 Y M s/p DDLKT 5/2016 ETOH.  He is doing well.     Up to date with his visits with various doctors.  Feeling well. Has had a quiet year, health-wise.  Had covid vaccine x2.     EXPLANT: No HCC  IS: Prograf 3-5 and MMF, pred 5 (per Dr. Sidhu)  LABS: Up to date, excellent liver function.    Lab Test 12/03/19  1113   PROTTOTAL 6.9   ALBUMIN 4.0   BILITOTAL 0.4   ALKPHOS 76   AST 22   ALT 39     REJECTION: None.  BILIARY ISSUES: None  STENT: Out  KIDNEY FUNCTION:   Creatinine   Date Value Ref Range Status   05/06/2021 1.46 (H) 0.66 - 1.25 mg/dL Final     BP: Good. Lisinopril, amlodipine  PREV:  Derm 10/2018. Flu shot done this year.  DISEASE RECURRENCE: Sober since prior to transplant  OTHER ISSUES:   PTLD in past. Recent imaging neg. Follows with oncology  Thyroid cancer, s/p thyroidectomy last surveillance July negative  Weight gain.   Had  "fistula out.        Exam  Gen Alert pleasant NAD  Resp No difficulty breathing. No cough  Skin No Jaundice  Eyes No icterus  Neuro FIGUEREDO  MSK no muscle wasting  Psyche Pleasant, appropriate. Well groomed.  Cricket Chen is a 40 year old male who is being evaluated via a billable video visit.      The patient has been notified of following:   \"This video visit will be conducted via a call between you and your physician/provider. We have found that certain health care needs can be provided without the need for an in-person physical exam.  This service lets us provide the care you need with a video conversation.  If a prescription is necessary we can send it directly to your pharmacy.  If lab work is needed we can place an order for that and you can then stop by our lab to have the test done at a later time. Video visits are billed at different rates depending on your insurance coverage.  Please reach out to your insurance provider with any questions.  If during the course of the call the physician/provider feels a video visit is not appropriate, you will not be charged for this service.\"   Patient has given verbal consent for Video visit? Yes    Type of service:  Video Visit  Video Start Time 1134  Video End Time 1154  Patient location: home  Will anyone else be joining your video visit? no  {If patient encounters technical issues they should call [phone] :1  Distant Location (provider location):  [facility]PATOLOGY [facility] [place]   Mode of Communication:  Video Conference via [employer]  I have reviewed and updated the patient's Past Medical History, Social History, Family History and Medication List.      Again, thank you for allowing me to participate in the care of your patient.        Sincerely,        Laureen Galvan MD    "

## 2021-05-11 NOTE — PROGRESS NOTES
"Cricket is a 40 year old who is being evaluated via a billable video visit.      How would you like to obtain your AVS? MyChart  If the video visit is dropped, the invitation should be resent by: Send to e-mail at: [email or web address]  Will anyone else be joining your video visit? No  {If patient encounters technical issues they should call [phone] :[id number]}    Video Start Time: {video visit start/end time for provider to select:152948}  Video-Visit Details    Type of service:  Video Visit    Video End Time:{video visit start/end time for provider to select:152948}    Originating Location (pt. Location): {video visit patient location:[id number]::\"Home\"}    Distant Location (provider location):  [facility]PAT[facility] [place]     Platform used for Video Visit: {Virtual Visit Platforms:982747::\"Cognitive Match\"}    "

## 2021-05-14 ENCOUNTER — TELEPHONE (OUTPATIENT)
Dept: ENDOCRINOLOGY | Facility: CLINIC | Age: 41
End: 2021-05-14

## 2021-05-14 ENCOUNTER — OFFICE VISIT (OUTPATIENT)
Dept: ENDOCRINOLOGY | Facility: CLINIC | Age: 41
End: 2021-05-14
Payer: COMMERCIAL

## 2021-05-14 VITALS — DIASTOLIC BLOOD PRESSURE: 96 MMHG | SYSTOLIC BLOOD PRESSURE: 159 MMHG

## 2021-05-14 DIAGNOSIS — C73 PAPILLARY THYROID CARCINOMA (H): ICD-10-CM

## 2021-05-14 DIAGNOSIS — E89.0 POSTOPERATIVE HYPOTHYROIDISM: ICD-10-CM

## 2021-05-14 DIAGNOSIS — C73 PAPILLARY THYROID CARCINOMA (H): Primary | ICD-10-CM

## 2021-05-14 LAB
T4 FREE SERPL-MCNC: 1.52 NG/DL (ref 0.76–1.46)
TSH SERPL DL<=0.005 MIU/L-ACNC: 0.16 MU/L (ref 0.4–4)

## 2021-05-14 PROCEDURE — 99N1030 PR STATISTIC REMEASURE THYROGLOBULIN: Mod: 90 | Performed by: PATHOLOGY

## 2021-05-14 PROCEDURE — 84432 ASSAY OF THYROGLOBULIN: CPT | Mod: 90 | Performed by: PATHOLOGY

## 2021-05-14 PROCEDURE — 99213 OFFICE O/P EST LOW 20 MIN: CPT | Performed by: INTERNAL MEDICINE

## 2021-05-14 PROCEDURE — 99000 SPECIMEN HANDLING OFFICE-LAB: CPT | Performed by: PATHOLOGY

## 2021-05-14 PROCEDURE — 84439 ASSAY OF FREE THYROXINE: CPT | Performed by: PATHOLOGY

## 2021-05-14 PROCEDURE — 84443 ASSAY THYROID STIM HORMONE: CPT | Performed by: PATHOLOGY

## 2021-05-14 PROCEDURE — 36415 COLL VENOUS BLD VENIPUNCTURE: CPT | Performed by: PATHOLOGY

## 2021-05-14 PROCEDURE — 86800 THYROGLOBULIN ANTIBODY: CPT | Mod: 90 | Performed by: PATHOLOGY

## 2021-05-14 RX ORDER — LEVOTHYROXINE SODIUM 137 UG/1
137 TABLET ORAL DAILY
Qty: 90 TABLET | Refills: 3 | Status: SHIPPED | OUTPATIENT
Start: 2021-05-14 | End: 2022-05-23

## 2021-05-14 NOTE — PATIENT INSTRUCTIONS
1 year follow up    If you have any questions, please do not hesitate to call clinic line at [phone] and ask for Endocrinology clinic.  If you need to fax, please fax to clinic fax number at [phone]    After clinic hours or weekends, please contact [phone] and ask for Endocrinologist-on call      Sincerely,    Zuleyma Gray MD  Endocrinology

## 2021-05-14 NOTE — PROGRESS NOTES
Endocrinology Note         Cricket is a 40 year old male presents today for follow up post total thyroidectomy    HPI  Cricket Chen is a 40 years old male with hx of post liver and kidney transplant in 5/2016, posttransplant lymphoproliferative disorder, atrial flutter status post direct current cardioversion, hypertension who is here for follow up post total thyroidectomy    I saw him first time in March 2017 for hypermetabolic focus in the right lobe of the thyroid with a max SUV of 12.7 on PET that was noted during baseline evaluation for diffuse large B cell lymphoma. Otherwise no suspicious FDG uptake within the head and neck. He also has PET-avid right axillary lymphadenopathy measuring 1.3 cm but no evidence of FDG-avid areas throughout the rest of his body.     At that time, US thyroid showed 1 cm mid/inferior solid right thyroid nodule which likely corresponds to the area of FDG avidity on prior PET CT. Subsequent FNA of that nodule showed positive for PTC.     He ultimately went for total thyroidectomy on 8/7/2017 by . His postoperative course was uncomplicated. Pathology showed PTC 0.8 cm in the right lobe with negative margin for carcinoma, 0.2 cm follicular adenoma was also identify in the right lobe. In the left lobe, 0.1 cm of PTC with negative margin and 1.2 cm benign colloid cyst were noted. No lymphovascular invasion was identified.    Interim history  Last seen February 2020. He has been doing well. He is taking levothyroxine 137  g a day,1 pill for 6 days and 1.5 pills x1 day per week. Lab in 2/6/2020 showed TSH 0.8 and FT4 1.2, Tg 0.16, TgAb<0.4. He denied difficulty swallowing or breathing. He denied any muscle twitching, muscle cramping, hand numbness or tingling. He followed up with oncologist for PTLD which is currently in remission.     Past Medical History  Past Medical History:   Diagnosis Date     Alcohol abuse     Last drink in Mid-April 2014     Anemia in ESRD (end-stage  renal disease) (H)      Anxiety 2008     Atrial flutter (H) 2017     Cirrhosis (H)     S/P liver transplant     Depression      History of blood transfusion      History of transposition of great vessels     atrial switch at age 8 months old     Hypertension      Liver transplant recipient (H)     2016     Papillary thyroid carcinoma (H)      Pneumonia 11-15-14     Renal transplant recipient     2016     Varices, esophageal (H)        Allergies  Allergies   Allergen Reactions     Hydromorphone Itching     Medications  Current Outpatient Medications   Medication Sig Dispense Refill     apixaban ANTICOAGULANT (ELIQUIS ANTICOAGULANT) 5 MG tablet Take 1 tablet (5 mg) by mouth 2 times daily Please call and schedule an appointment for additional refills, [phone] 180 tablet 1     ketoconazole (NIZORAL) 2 % cream Twice daily to areas of rash on face 60 g 1     levothyroxine (SYNTHROID/LEVOTHROID) 137 MCG tablet Take 1 tablet by mouth Monday - Saturday  and 1.5 tablets on Sunday keep upcoming appt 32 tablet 0     lisinopril (ZESTRIL) 10 MG tablet TAKE ONE TABLET BY MOUTH ONCE DAILY 90 tablet 1     LORazepam (ATIVAN) 1 MG tablet Take 1 tablet (1 mg) by mouth daily as needed (severe anxiety/panic/sleep) 15 tablet 0     magnesium oxide (MAG-OX) 400 MG tablet TAKE ONE TABLET BY MOUTH TWICE A  tablet 11     metoprolol succinate ER (TOPROL-XL) 25 MG 24 hr tablet Take 1 tablet (25 mg) by mouth 2 times daily 180 tablet 3     mycophenolate (GENERIC EQUIVALENT) 250 MG capsule Take 2 capsules (500 mg) by mouth 2 times daily 120 capsule 11     sulfamethoxazole-trimethoprim (BACTRIM) 400-80 MG tablet Take 1 tablet by mouth daily 30 tablet 11     tacrolimus (GENERIC EQUIVALENT) 0.5 MG capsule TAKE ONE CAPSULE BY MOUTH EVERY MORNING (TOTAL DOSE 1.5MG EVERY MORNING AND 1MG EVERY EVENING) 90 capsule 3     tacrolimus (GENERIC EQUIVALENT) 1 MG capsule TAKE ONE CAPSULE BY MOUTH EVERY 12 HOURS (TOTAL DOSE 1.5MG EVERY MORNING AND 1MG  EVERY EVENING 180 capsule 3     traZODone (DESYREL) 50 MG tablet Take 1 tablet (50 mg) by mouth at bedtime as needed, may repeat once for sleep 60 tablet 2     Family History  family history includes Anxiety Disorder in his mother and sister; Arthritis in his father; Hyperlipidemia in his mother; Hypertension in his father, mother, and sister; No Known Problems in his brother, brother, maternal grandfather, maternal grandmother, paternal grandfather, and paternal grandmother.   No family hx of thyroid cancer    Social History  Social History     Tobacco Use     Smoking status: Former Smoker     Packs/day: 1.00     Years: 3.00     Pack years: 3.00     Types: Cigarettes     Start date: 1997     Quit date: 2000     Years since quittin.6     Smokeless tobacco: Former User     Quit date: 9/10/2001     Tobacco comment: Quit chewing in early    Substance Use Topics     Alcohol use: No     Alcohol/week: 0.0 standard drinks     Comment: ~10 drinks per day for ten years, quit in 2014     Drug use: No     Types: Marijuana     Comment: in HS     quit drinking  Non , no children  He is currently working as a  teacher    ROS  Constitutional: weight is fluctuating, good energy, no night sweat  Eyes: no vision change, diplopia or red eyes   Neck: no difficulty swallowing, no choking, no neck pain, no neck swelling  Cardiovascular: no chest pain, palpitations  Respiratory: no dyspnea, cough, shortness of breath or wheezing   GI: no nausea, vomiting, diarrhea or constipation, no abdominal pain   : no change in urine, no dysuria or hematuria  Musculoskeletal: no joint or muscle pain or swelling   Integumentary: no concerning lesions  Neuro: no loss of strength or sensation, no numbness or tingling, no tremor, no dizziness, no headache   Endo: no polyuria or polydipsia, no temperature intolerance   Heme/Lymph: no concerning bumps, no bleeding problems   Allergy: no environmental allergies    Psych: no depression or anxiety     Physical Exam  BP (!) 159/96 (BP Location: Right arm, Patient Position: Sitting)   There is no height or weight on file to calculate BMI.  Constitutional: no distress, comfortable, pleasant   Eyes: anicteric, normal extra-ocular movements, no lid lag or retraction  Neck: well healed surgical scar at the upper part of the right neck and lower neck, no discrete nodule  Cardiovascular: regular rate and rhythm, normal S1 and S2, no murmurs  Respiratory: clear to auscultation, no wheezes or crackles, normal breath sounds   Gastrointestinal:  Surgical scar along right and left costal area, nontender, no hepatomegaly, no masses   Musculoskeletal: no edema   Skin: no jaundice   Neurological: cranial nerves intact, 2+ reflexes at patella , normal gait, no tremor on outstretched hands bilaterally  Psychological: appropriate mood   Lymphatic: no cervical  lymphadenopathy.    RESULTS  PET 2/4/2017  HEAD/NECK:  Hypermetabolic focus in the right lobe of the thyroid with a max SUV of 12.7. Otherwise no suspicious FDG uptake within the head and neck. There is a 3.0 x 2.2 cm postoperative collection in the right lateral  neck anterior to the sternocleidal mastoid muscle, lateral to the right submandibular gland.   No abnormality identified within the mucosal spaces of the neck. Tongue base is normal. No lymphadenopathy within the neck. Limited head CT is within normal limits.    IMPRESSION:   1. Hypermetabolic right axillary lymph node, suspicious for involvement by lymphoproliferative disease.  2. Hypermetabolic lesion in the right thyroid with a max SUV of 12.7. No definite CT correlate identified. Ultrasound of the thyroid is recommended.  3. Indeterminant 2.2 cm area of decreased enhancement within the right lower quadrant transplant kidney. It does not appear to be  masslike. Ultrasound is recommended for further characterization   4. Postsurgical changes of liver and right lower quadrant  kidney transplantation.  5. Transposition of great vessels with postsurgical changes of atrial baffle procedure resulting in a large, presumably intentional atrial septal defect.     US thyroid 3/2/2017  Thyroid parenchyma: Homogeneous  The right lobe of the thyroid measures: 1.6 x 1.6 x 4.5 cm   The thyroid isthmus measures: 0.2 cm   The left lobe of the thyroid measures: 1.6 x 1.1 x 4 cm      Right lobe:  Nodule 1:  Nodule measurement: 0.3 x 0.3 x 0.3 cm  Echogenicity: Predominantly isoechoic  Consistency: Mixed cystic and solid  Calcifications: no  Hypervascular: Minimal peripheral vascularity  Interval growth (>20%): No prior imaging available     Nodule 2:  Nodule measurement: 0.9 x 0.8 x 0.8 cm  Echogenicity: Hypoechoic  Consistency: solid  Calcifications: no  Hypervascular: yes  Interval growth (>20%): No prior imaging available     Isthmus: No nodule     Left Lobe:   Nodule 1:  Nodule measurement: 0.7 x 0.5 x 0.8 cm  Echogenicity: Hypoechoic  Consistency: cystic  Calcifications: no; nondependent echogenic focus with ringdown  artifact most compatible with inspissated colloid.  Hypervascular: no  Interval growth (>20%): No prior imaging available     Impression:  1.  Almost 1 cm mid/inferior solid right thyroid nodule which likely corresponds to the area of FDG avidity on prior PET CT. Consider FNA for further evaluation.  2.  Additional subcentimeter right thyroid nodule and left thyroid colloid cyst are noted    FNA right thyroid nodule 3/16/2017  Thyroid, right #2, ultrasound-guided fine needle aspiration:   Positive for malignancy.   Papillary thyroid carcinoma   Specimen Adequacy: Satisfactory for evaluation.    Surgical pathology 8/7/17  FINAL DIAGNOSIS:   A- Thyroid, right, lobectomy:   - Papillary thyroid microcarcinoma: 0.8 cm in greatest dimension   - Margins negative for carcinoma   - Perineural and vascular invasion not identified.   - Follicular adenoma, 0.2 cm in greatest dimension   - See tumor  synopsis for details     B- Thyroid, left, lobectomy:   - Papillary thyroid microcarcinoma: 0.1 cm in greatest dimension   - Margins negative for carcinoma   - Perineural and vascular invasion not identified.   - Benign colloid cyst: 1.2 cm in greatest dimension   - See tumor synopsis for details     Report Name: Thyroid Gland - Resection   Status: Submitted     Part(s) Involved:   A: Thyroid, right     Synoptic Report:     SPECIMEN     Procedure:         - Total thyroidectomy     TUMOR     Histologic Type:         - Papillary carcinoma       Common Significant Variants:           - Classical (usual, conventional)     Tumor Size: 0.8 cm     Tumor Laterality:         - Right lobe         - Left lobe     Tumor Focality:         - Multifocal     Tumor Extent       Extrathyroidal Extension:           - Not identified     Accessory Tumor Findings       Angioinvasion (vascular invasion):           - Not identified       Lymphatic Invasion:           - Not identified       Perineural Invasion:           - Not identified     MARGINS     Margins:         - Margins uninvolved by carcinoma     LYMPH NODES     Regional Lymph Nodes:         - No nodes submitted or found     STAGE (PTNM)     TNM Descriptors:         - m (multiple primary tumors)     Primary Tumor (pT):         - pT1a:  Tumor 1 cm or less in greatest dimension limited to the   thyroid.     Regional Lymph Nodes (pN):         - pNX: Regional lymph nodes cannot be assessed     ADDITIONAL FINDINGS     Additional Pathologic Findings:         - Adenoma     US head/neck 7/3/2017  COMPARISON: CT neck 3/28/2017, ultrasound thyroid 3/2/2017.     Lymph nodes are measured bilaterally with measurements given in transverse, AP, and length (craniocaudal) dimensions as follows:     Right:  Level 2: No lymph nodes identified.     Level 3:   1: 7 x 2 mm lymph node.     Level 4:  1: 7 x 5 x 11 mm lymph node.  2: 8 x 4 x 9 mm lymph node.     Level 5: No lymph nodes identified.  Level  6: No lymph nodes identified.  Level 7: No lymph nodes identified.     Left:  Level 2:   1: 9 x 5 x 6 mm lymph node.  2: 6 x 4 x 10 mm lymph node.     Level 3: No lymph nodes identified.  Level 4: No lymph nodes identified.  Level 5: No lymph nodes identified.  Level 6: No lymph nodes identified.  Level 7: No lymph nodes identified.     Thyroid: Thyroid nodules partially visualized, including the 8 mm nodule in the inferior right lobe, and an 8 mm hypoechoic left inferior nodule.      IMPRESSION:  1.  Lymph nodes as described without malignant features.  2.  Partially visualized thyroid nodules, not significantly changed from 3/16/2017.      9/27/2017: TSH 0.13, FT4 1.17, Tg 0.23, TgAb<0.4  2/9/2018: TSH 0.08, free T4 1.52, Tg 0.19, TgAb<0.4  3/6/2018: TSH 0.12, free T4 1.87  4/3/2018: TSH 0.39, free T4 1.36  6/12/2018: TSH 0.94, free T4 1.08  8/10/2018: TSH 2.3, FT4 1.29, Tg 0.33, TgAb <0.4  10/16/2018: TSH 1.16, FT4 1.16  1/17/2019: FT4 1.33  5/10/2019: TSH 0.5, FT4 1.34, Tg 0.19, TgAb<0.4    US neck 8/10/2018  Lymph nodes are measured bilaterally with measurements given in transverse, AP, and craniocaudal dimensions as follows:     Right:  Level 1: No lymphadenopathy  Level 2: No lymphadenopathy  Level 3: 8 x 3 x 10 mm lymph node with a preserved fatty hilum  Level 4: There are 3 lymph nodes with preserved fatty hilum measuring 1.1 x 0.6 x 1.2 cm, 0.8 x 0.3 x 0.9 cm, and 0.8 x 0.4 x 0.8 cm  Level 5: No lymphadenopathy  Level 6: No lymphadenopathy     Left:  Level 1: No lymphadenopathy  Level 2: There are 2 lymph nodes without definite fatty rom which measure 1.1 x 0.4 x 1.0 cm and 0.6 x 0.5 x 0.9 cm  Level 3: No lymphadenopathy  Level 4: 0.7 x 0.3 x 2.1 cm lymph node with a preserved fatty hilum  Level 5: No lymphadenopathy  Level 6: No lymphadenopathy                                                                   IMPRESSION: Soft tissue neck ultrasound with lymph node measurements as described above. No  pathologically enlarged or morphologically suspicious lymph nodes. There are two left level 2 lymph nodes which do not have a definite fatty hilum, although they are similar in size and appearance from 7/3/2017 and were not hypermetabolic on PET/CT 7/19/2017.    US neck 5/13/2019  Lymph nodes are measured bilaterally with measurements given in craniocaudal, transverse and AP dimensions as follows:     Right:  Level 1: No lymphadenopathy  Level 2: No lymphadenopathy  Level 3: No lymphadenopathy  Level 4: 1.4 x 0.6 x 2.2 cm lymph node with a fatty hilum (previously 0.9 x 0.3 x 1.0 cm), 1.2 x 0.6 x 1.2 cm lymph node with a fatty hilum  (previously 0.8 x 0.4 x 0.9 cm), and a 1.0 x 0.6 x 0.8 cm lymph node with a fatty hilum  Level 5: No lymphadenopathy  Level 6: No lymphadenopathy  Level 7: No lymphadenopathy     Left:  Level 1: No lymphadenopathy  Level 2: 1.3 x 0.8 x 1.5 cm lymph node with a small fatty hilum, and a 6 x 5 x 11 mm lymph node with a small fatty hilum  Level 3: No lymphadenopathy  Level 4: 3 x 7 x 12 mm lymph node with a small fatty hilum  Level 5: No lymphadenopathy  Level 6: No lymphadenopathy  Level 7: No lymphadenopathy                                                                    IMPRESSION: Soft tissue neck ultrasound with lymph node measurements as described above. Most notably, two of the right level 4 lymph nodes have enlarged from 8/10/2018.     Ultrasound neck 2/12/2020  Lymph nodes are measured bilaterally with measurements given in craniocaudal, transverse and AP dimensions as follows:     Right:  Level 1: No lymphadenopathy  Level 2: No lymphadenopathy  Level 3: No lymphadenopathy  Level 4: 1.1 x 0.9 x 2.3 cm  lymph node with a fatty hilum (previously 1.4 x 0.6 x 2.2 cm), 0.9 x 0.9 x 1.3 cm lymph node with a fatty hilum (previously 1.2 x 0.6 x 1.2 cm), and a 0.8 x 0.8 x 0.8 cm lymph node  with a fatty hilum (previously 1.0 x 0.6 x 0.8 cm)  Level 5: No lymphadenopathy  Level 6: No  lymphadenopathy  Level 7: No lymphadenopathy     Left:  Level 1: No lymphadenopathy  Level 2: 1.2 x 0.6 x 1.2 cm ovoid lymph node, previously 1.3 x 0.8 x 1.5 cm. 8 x 6 x 9 mm lymph node with echogenic hilum, previously 6 x 5 x 11 mm. Additional 1.3 x 0.7 x 2 cm lymph node with echogenic hilum. Overall these are favored as reactive in nature.  Level 3: No lymphadenopathy  Level 4: 3 x 8 x 12 mm lymph node with a small fatty hilum, unchanged  Level 5: No lymphadenopathy  Level 6: No lymphadenopathy  Level 7: No lymphadenopathy     Overall no new lymphadenopathy in the neck. No worrisome cervical lymph nodes to suggest metastatic lymphadenopathy.                                                                      IMPRESSION: Cervical lymph nodes as described above. No enlarging or new lymphadenopathy to suggest metastatic lymphadenopathy.    ASSESSMENT:    Cricket Chen is a 40 years old male with hx of post liver and kidney transplant in 5/2016, posttransplant lymphoproliferative disorder, atrial flutter status post direct current cardioversion, hypertension who is here for follow up post total thyroidectomy    1) multifocal micro PTC: he was noted to have thyroid cancer during evaluation of diffuse large B cell lymphoma. He was initially noted for hypermetabolic activity at right thyroid gland with SUV max of 12.7 from PET scan. Subsequent FNA of right thyroid nodule showed positive for thyroid cancer. He underwent total thyroidectomy without complications.  His prognosis is favorable given micro PTC, negative margin and no lymphovascular invasion.  Lab in 2/6/2020 showed TSH 0.8 and FT4 1.2, Tg 0.16, TgAb<0.4  I will check TSH, FT4, Tg, TgAb, today    2) postoperative hypothyroidism: clinically euthyroid.  Currently on levothyroxine 137 mcg, 1 pill x 6 days and 1.5 pills x1 day per week. Will check lab today as per #1.    PLAN:   - lab for TSH, FT4, Tg, TgAb  - RTC 1 year     External notes/medical records  independently reviewed, labs and imaging independently reviewed, medical management and tests to be discussed/communicated to patient.    Time: I spent 23 minutes spent on the date of the encounter preparing to see patient (including chart review and preparation), obtaining and or reviewing additional medical history, performing a physical exam and evaluation, documenting clinical information in the electronic health record, independently interpreting results, communicating results to the patient and coordinating care.      Zuleyma Gray MD     Division of Diabetes and Endocrinology  Department of Medicine  [phone]

## 2021-05-14 NOTE — TELEPHONE ENCOUNTER
Results for IJEOMA DOBSON (MRN [id number]) as of 5/14/2021 16:27   Ref. Range 5/14/2021 12:18   T4 Free Latest Ref Range: 0.76 - 1.46 ng/dL 1.52 (H)   TSH Latest Ref Range: 0.40 - 4.00 mU/L 0.16 (L)       Called and left message to patient to adjust the dose. He will take levothyroxine 137 mcg daily  Advise to get lab in 2 months    Zuleyma Gray MD  Division of Diabetes and Endocrinology  Department of Medicine

## 2021-05-14 NOTE — LETTER
5/14/2021       RE: Cricket Chen  [address]  [facility] [id number]     Dear Colleague,    Thank you for referring your patient, Cricket Chen, to the [facility] [place] at [facility]. Please see a copy of my visit note below.         Endocrinology Note         Cricket is a 40 year old male presents today for follow up post total thyroidectomy    HPI  Cricket Chen is a 40 years old male with hx of post liver and kidney transplant in 5/2016, posttransplant lymphoproliferative disorder, atrial flutter status post direct current cardioversion, hypertension who is here for follow up post total thyroidectomy    I saw him first time in March 2017 for hypermetabolic focus in the right lobe of the thyroid with a max SUV of 12.7 on PET that was noted during baseline evaluation for diffuse large B cell lymphoma. Otherwise no suspicious FDG uptake within the head and neck. He also has PET-avid right axillary lymphadenopathy measuring 1.3 cm but no evidence of FDG-avid areas throughout the rest of his body.     At that time, US thyroid showed 1 cm mid/inferior solid right thyroid nodule which likely corresponds to the area of FDG avidity on prior PET CT. Subsequent FNA of that nodule showed positive for PTC.     He ultimately went for total thyroidectomy on 8/7/2017 by . His postoperative course was uncomplicated. Pathology showed PTC 0.8 cm in the right lobe with negative margin for carcinoma, 0.2 cm follicular adenoma was also identify in the right lobe. In the left lobe, 0.1 cm of PTC with negative margin and 1.2 cm benign colloid cyst were noted. No lymphovascular invasion was identified.    Interim history  Last seen February 2020. He has been doing well. He is taking levothyroxine 137  g a day,1 pill for 6 days and 1.5 pills x1 day per week. Lab in 2/6/2020 showed TSH 0.8 and FT4 1.2, Tg 0.16, TgAb<0.4. He denied  difficulty swallowing or breathing. He denied any muscle twitching, muscle cramping, hand numbness or tingling. He followed up with oncologist for PTLD which is currently in remission.     Past Medical History  Past Medical History:   Diagnosis Date     Alcohol abuse     Last drink in Mid-April 2014     Anemia in ESRD (end-stage renal disease) (H)      Anxiety 2008     Atrial flutter (H) 2017     Cirrhosis (H)     S/P liver transplant     Depression      History of blood transfusion      History of transposition of great vessels     atrial switch at age 8 months old     Hypertension      Liver transplant recipient (H)     2016     Papillary thyroid carcinoma (H)      Pneumonia 11-15-14     Renal transplant recipient     2016     Varices, esophageal (H)        Allergies  Allergies   Allergen Reactions     Hydromorphone Itching     Medications  Current Outpatient Medications   Medication Sig Dispense Refill     apixaban ANTICOAGULANT (ELIQUIS ANTICOAGULANT) 5 MG tablet Take 1 tablet (5 mg) by mouth 2 times daily Please call and schedule an appointment for additional refills, [phone] 180 tablet 1     ketoconazole (NIZORAL) 2 % cream Twice daily to areas of rash on face 60 g 1     levothyroxine (SYNTHROID/LEVOTHROID) 137 MCG tablet Take 1 tablet by mouth Monday - Saturday  and 1.5 tablets on Sunday keep upcoming appt 32 tablet 0     lisinopril (ZESTRIL) 10 MG tablet TAKE ONE TABLET BY MOUTH ONCE DAILY 90 tablet 1     LORazepam (ATIVAN) 1 MG tablet Take 1 tablet (1 mg) by mouth daily as needed (severe anxiety/panic/sleep) 15 tablet 0     magnesium oxide (MAG-OX) 400 MG tablet TAKE ONE TABLET BY MOUTH TWICE A  tablet 11     metoprolol succinate ER (TOPROL-XL) 25 MG 24 hr tablet Take 1 tablet (25 mg) by mouth 2 times daily 180 tablet 3     mycophenolate (GENERIC EQUIVALENT) 250 MG capsule Take 2 capsules (500 mg) by mouth 2 times daily 120 capsule 11     sulfamethoxazole-trimethoprim (BACTRIM) 400-80 MG tablet  Take 1 tablet by mouth daily 30 tablet 11     tacrolimus (GENERIC EQUIVALENT) 0.5 MG capsule TAKE ONE CAPSULE BY MOUTH EVERY MORNING (TOTAL DOSE 1.5MG EVERY MORNING AND 1MG EVERY EVENING) 90 capsule 3     tacrolimus (GENERIC EQUIVALENT) 1 MG capsule TAKE ONE CAPSULE BY MOUTH EVERY 12 HOURS (TOTAL DOSE 1.5MG EVERY MORNING AND 1MG EVERY EVENING 180 capsule 3     traZODone (DESYREL) 50 MG tablet Take 1 tablet (50 mg) by mouth at bedtime as needed, may repeat once for sleep 60 tablet 2     Family History  family history includes Anxiety Disorder in his mother and sister; Arthritis in his father; Hyperlipidemia in his mother; Hypertension in his father, mother, and sister; No Known Problems in his brother, brother, maternal grandfather, maternal grandmother, paternal grandfather, and paternal grandmother.   No family hx of thyroid cancer    Social History  Social History     Tobacco Use     Smoking status: Former Smoker     Packs/day: 1.00     Years: 3.00     Pack years: 3.00     Types: Cigarettes     Start date: 1997     Quit date: 2000     Years since quittin.6     Smokeless tobacco: Former User     Quit date: 9/10/2001     Tobacco comment: Quit chewing in early    Substance Use Topics     Alcohol use: No     Alcohol/week: 0.0 standard drinks     Comment: ~10 drinks per day for ten years, quit in 2014     Drug use: No     Types: Marijuana     Comment: in      quit drinking  Non , no children  He is currently working as a  teacher    ROS  Constitutional: weight is fluctuating, good energy, no night sweat  Eyes: no vision change, diplopia or red eyes   Neck: no difficulty swallowing, no choking, no neck pain, no neck swelling  Cardiovascular: no chest pain, palpitations  Respiratory: no dyspnea, cough, shortness of breath or wheezing   GI: no nausea, vomiting, diarrhea or constipation, no abdominal pain   : no change in urine, no dysuria or hematuria  Musculoskeletal: no  joint or muscle pain or swelling   Integumentary: no concerning lesions  Neuro: no loss of strength or sensation, no numbness or tingling, no tremor, no dizziness, no headache   Endo: no polyuria or polydipsia, no temperature intolerance   Heme/Lymph: no concerning bumps, no bleeding problems   Allergy: no environmental allergies   Psych: no depression or anxiety     Physical Exam  BP (!) 159/96 (BP Location: Right arm, Patient Position: Sitting)   There is no height or weight on file to calculate BMI.  Constitutional: no distress, comfortable, pleasant   Eyes: anicteric, normal extra-ocular movements, no lid lag or retraction  Neck: well healed surgical scar at the upper part of the right neck and lower neck, no discrete nodule  Cardiovascular: regular rate and rhythm, normal S1 and S2, no murmurs  Respiratory: clear to auscultation, no wheezes or crackles, normal breath sounds   Gastrointestinal:  Surgical scar along right and left costal area, nontender, no hepatomegaly, no masses   Musculoskeletal: no edema   Skin: no jaundice   Neurological: cranial nerves intact, 2+ reflexes at patella , normal gait, no tremor on outstretched hands bilaterally  Psychological: appropriate mood   Lymphatic: no cervical  lymphadenopathy.    RESULTS  PET 2/4/2017  HEAD/NECK:  Hypermetabolic focus in the right lobe of the thyroid with a max SUV of 12.7. Otherwise no suspicious FDG uptake within the head and neck. There is a 3.0 x 2.2 cm postoperative collection in the right lateral  neck anterior to the sternocleidal mastoid muscle, lateral to the right submandibular gland.   No abnormality identified within the mucosal spaces of the neck. Tongue base is normal. No lymphadenopathy within the neck. Limited head CT is within normal limits.    IMPRESSION:   1. Hypermetabolic right axillary lymph node, suspicious for involvement by lymphoproliferative disease.  2. Hypermetabolic lesion in the right thyroid with a max SUV of 12.7. No  definite CT correlate identified. Ultrasound of the thyroid is recommended.  3. Indeterminant 2.2 cm area of decreased enhancement within the right lower quadrant transplant kidney. It does not appear to be  masslike. Ultrasound is recommended for further characterization   4. Postsurgical changes of liver and right lower quadrant kidney transplantation.  5. Transposition of great vessels with postsurgical changes of atrial baffle procedure resulting in a large, presumably intentional atrial septal defect.     US thyroid 3/2/2017  Thyroid parenchyma: Homogeneous  The right lobe of the thyroid measures: 1.6 x 1.6 x 4.5 cm   The thyroid isthmus measures: 0.2 cm   The left lobe of the thyroid measures: 1.6 x 1.1 x 4 cm      Right lobe:  Nodule 1:  Nodule measurement: 0.3 x 0.3 x 0.3 cm  Echogenicity: Predominantly isoechoic  Consistency: Mixed cystic and solid  Calcifications: no  Hypervascular: Minimal peripheral vascularity  Interval growth (>20%): No prior imaging available     Nodule 2:  Nodule measurement: 0.9 x 0.8 x 0.8 cm  Echogenicity: Hypoechoic  Consistency: solid  Calcifications: no  Hypervascular: yes  Interval growth (>20%): No prior imaging available     Isthmus: No nodule     Left Lobe:   Nodule 1:  Nodule measurement: 0.7 x 0.5 x 0.8 cm  Echogenicity: Hypoechoic  Consistency: cystic  Calcifications: no; nondependent echogenic focus with ringdown  artifact most compatible with inspissated colloid.  Hypervascular: no  Interval growth (>20%): No prior imaging available     Impression:  1.  Almost 1 cm mid/inferior solid right thyroid nodule which likely corresponds to the area of FDG avidity on prior PET CT. Consider FNA for further evaluation.  2.  Additional subcentimeter right thyroid nodule and left thyroid colloid cyst are noted    FNA right thyroid nodule 3/16/2017  Thyroid, right #2, ultrasound-guided fine needle aspiration:   Positive for malignancy.   Papillary thyroid carcinoma   Specimen  Adequacy: Satisfactory for evaluation.    Surgical pathology 8/7/17  FINAL DIAGNOSIS:   A- Thyroid, right, lobectomy:   - Papillary thyroid microcarcinoma: 0.8 cm in greatest dimension   - Margins negative for carcinoma   - Perineural and vascular invasion not identified.   - Follicular adenoma, 0.2 cm in greatest dimension   - See tumor synopsis for details     B- Thyroid, left, lobectomy:   - Papillary thyroid microcarcinoma: 0.1 cm in greatest dimension   - Margins negative for carcinoma   - Perineural and vascular invasion not identified.   - Benign colloid cyst: 1.2 cm in greatest dimension   - See tumor synopsis for details     Report Name: Thyroid Gland - Resection   Status: Submitted     Part(s) Involved:   A: Thyroid, right     Synoptic Report:     SPECIMEN     Procedure:         - Total thyroidectomy     TUMOR     Histologic Type:         - Papillary carcinoma       Common Significant Variants:           - Classical (usual, conventional)     Tumor Size: 0.8 cm     Tumor Laterality:         - Right lobe         - Left lobe     Tumor Focality:         - Multifocal     Tumor Extent       Extrathyroidal Extension:           - Not identified     Accessory Tumor Findings       Angioinvasion (vascular invasion):           - Not identified       Lymphatic Invasion:           - Not identified       Perineural Invasion:           - Not identified     MARGINS     Margins:         - Margins uninvolved by carcinoma     LYMPH NODES     Regional Lymph Nodes:         - No nodes submitted or found     STAGE (PTNM)     TNM Descriptors:         - m (multiple primary tumors)     Primary Tumor (pT):         - pT1a:  Tumor 1 cm or less in greatest dimension limited to the   thyroid.     Regional Lymph Nodes (pN):         - pNX: Regional lymph nodes cannot be assessed     ADDITIONAL FINDINGS     Additional Pathologic Findings:         - Adenoma     US head/neck 7/3/2017  COMPARISON: CT neck 3/28/2017, ultrasound thyroid  3/2/2017.     Lymph nodes are measured bilaterally with measurements given in transverse, AP, and length (craniocaudal) dimensions as follows:     Right:  Level 2: No lymph nodes identified.     Level 3:   1: 7 x 2 mm lymph node.     Level 4:  1: 7 x 5 x 11 mm lymph node.  2: 8 x 4 x 9 mm lymph node.     Level 5: No lymph nodes identified.  Level 6: No lymph nodes identified.  Level 7: No lymph nodes identified.     Left:  Level 2:   1: 9 x 5 x 6 mm lymph node.  2: 6 x 4 x 10 mm lymph node.     Level 3: No lymph nodes identified.  Level 4: No lymph nodes identified.  Level 5: No lymph nodes identified.  Level 6: No lymph nodes identified.  Level 7: No lymph nodes identified.     Thyroid: Thyroid nodules partially visualized, including the 8 mm nodule in the inferior right lobe, and an 8 mm hypoechoic left inferior nodule.      IMPRESSION:  1.  Lymph nodes as described without malignant features.  2.  Partially visualized thyroid nodules, not significantly changed from 3/16/2017.      9/27/2017: TSH 0.13, FT4 1.17, Tg 0.23, TgAb<0.4  2/9/2018: TSH 0.08, free T4 1.52, Tg 0.19, TgAb<0.4  3/6/2018: TSH 0.12, free T4 1.87  4/3/2018: TSH 0.39, free T4 1.36  6/12/2018: TSH 0.94, free T4 1.08  8/10/2018: TSH 2.3, FT4 1.29, Tg 0.33, TgAb <0.4  10/16/2018: TSH 1.16, FT4 1.16  1/17/2019: FT4 1.33  5/10/2019: TSH 0.5, FT4 1.34, Tg 0.19, TgAb<0.4    US neck 8/10/2018  Lymph nodes are measured bilaterally with measurements given in transverse, AP, and craniocaudal dimensions as follows:     Right:  Level 1: No lymphadenopathy  Level 2: No lymphadenopathy  Level 3: 8 x 3 x 10 mm lymph node with a preserved fatty hilum  Level 4: There are 3 lymph nodes with preserved fatty hilum measuring 1.1 x 0.6 x 1.2 cm, 0.8 x 0.3 x 0.9 cm, and 0.8 x 0.4 x 0.8 cm  Level 5: No lymphadenopathy  Level 6: No lymphadenopathy     Left:  Level 1: No lymphadenopathy  Level 2: There are 2 lymph nodes without definite fatty rom which measure 1.1 x 0.4 x  1.0 cm and 0.6 x 0.5 x 0.9 cm  Level 3: No lymphadenopathy  Level 4: 0.7 x 0.3 x 2.1 cm lymph node with a preserved fatty hilum  Level 5: No lymphadenopathy  Level 6: No lymphadenopathy                                                                   IMPRESSION: Soft tissue neck ultrasound with lymph node measurements as described above. No pathologically enlarged or morphologically suspicious lymph nodes. There are two left level 2 lymph nodes which do not have a definite fatty hilum, although they are similar in size and appearance from 7/3/2017 and were not hypermetabolic on PET/CT 7/19/2017.    US neck 5/13/2019  Lymph nodes are measured bilaterally with measurements given in craniocaudal, transverse and AP dimensions as follows:     Right:  Level 1: No lymphadenopathy  Level 2: No lymphadenopathy  Level 3: No lymphadenopathy  Level 4: 1.4 x 0.6 x 2.2 cm lymph node with a fatty hilum (previously 0.9 x 0.3 x 1.0 cm), 1.2 x 0.6 x 1.2 cm lymph node with a fatty hilum  (previously 0.8 x 0.4 x 0.9 cm), and a 1.0 x 0.6 x 0.8 cm lymph node with a fatty hilum  Level 5: No lymphadenopathy  Level 6: No lymphadenopathy  Level 7: No lymphadenopathy     Left:  Level 1: No lymphadenopathy  Level 2: 1.3 x 0.8 x 1.5 cm lymph node with a small fatty hilum, and a 6 x 5 x 11 mm lymph node with a small fatty hilum  Level 3: No lymphadenopathy  Level 4: 3 x 7 x 12 mm lymph node with a small fatty hilum  Level 5: No lymphadenopathy  Level 6: No lymphadenopathy  Level 7: No lymphadenopathy                                                                    IMPRESSION: Soft tissue neck ultrasound with lymph node measurements as described above. Most notably, two of the right level 4 lymph nodes have enlarged from 8/10/2018.     Ultrasound neck 2/12/2020  Lymph nodes are measured bilaterally with measurements given in craniocaudal, transverse and AP dimensions as follows:     Right:  Level 1: No lymphadenopathy  Level 2: No  lymphadenopathy  Level 3: No lymphadenopathy  Level 4: 1.1 x 0.9 x 2.3 cm  lymph node with a fatty hilum (previously 1.4 x 0.6 x 2.2 cm), 0.9 x 0.9 x 1.3 cm lymph node with a fatty hilum (previously 1.2 x 0.6 x 1.2 cm), and a 0.8 x 0.8 x 0.8 cm lymph node  with a fatty hilum (previously 1.0 x 0.6 x 0.8 cm)  Level 5: No lymphadenopathy  Level 6: No lymphadenopathy  Level 7: No lymphadenopathy     Left:  Level 1: No lymphadenopathy  Level 2: 1.2 x 0.6 x 1.2 cm ovoid lymph node, previously 1.3 x 0.8 x 1.5 cm. 8 x 6 x 9 mm lymph node with echogenic hilum, previously 6 x 5 x 11 mm. Additional 1.3 x 0.7 x 2 cm lymph node with echogenic hilum. Overall these are favored as reactive in nature.  Level 3: No lymphadenopathy  Level 4: 3 x 8 x 12 mm lymph node with a small fatty hilum, unchanged  Level 5: No lymphadenopathy  Level 6: No lymphadenopathy  Level 7: No lymphadenopathy     Overall no new lymphadenopathy in the neck. No worrisome cervical lymph nodes to suggest metastatic lymphadenopathy.                                                                      IMPRESSION: Cervical lymph nodes as described above. No enlarging or new lymphadenopathy to suggest metastatic lymphadenopathy.    ASSESSMENT:    Cricket Chen is a 40 years old male with hx of post liver and kidney transplant in 5/2016, posttransplant lymphoproliferative disorder, atrial flutter status post direct current cardioversion, hypertension who is here for follow up post total thyroidectomy    1) multifocal micro PTC: he was noted to have thyroid cancer during evaluation of diffuse large B cell lymphoma. He was initially noted for hypermetabolic activity at right thyroid gland with SUV max of 12.7 from PET scan. Subsequent FNA of right thyroid nodule showed positive for thyroid cancer. He underwent total thyroidectomy without complications.  His prognosis is favorable given micro PTC, negative margin and no lymphovascular invasion.  Lab in 2/6/2020  showed TSH 0.8 and FT4 1.2, Tg 0.16, TgAb<0.4  I will check TSH, FT4, Tg, TgAb, today    2) postoperative hypothyroidism: clinically euthyroid.  Currently on levothyroxine 137 mcg, 1 pill x 6 days and 1.5 pills x1 day per week. Will check lab today as per #1.    PLAN:   - lab for TSH, FT4, Tg, TgAb  - RTC 1 year     External notes/medical records independently reviewed, labs and imaging independently reviewed, medical management and tests to be discussed/communicated to patient.    Time: I spent 23 minutes spent on the date of the encounter preparing to see patient (including chart review and preparation), obtaining and or reviewing additional medical history, performing a physical exam and evaluation, documenting clinical information in the electronic health record, independently interpreting results, communicating results to the patient and coordinating care.      Zuleyma Gray MD     Division of Diabetes and Endocrinology  Department of Medicine  [phone]

## 2021-05-21 LAB — LAB SCANNED RESULT: NORMAL

## 2021-06-03 ENCOUNTER — VIRTUAL VISIT (OUTPATIENT)
Dept: NEPHROLOGY | Facility: CLINIC | Age: 41
End: 2021-06-03
Attending: INTERNAL MEDICINE
Payer: COMMERCIAL

## 2021-06-03 DIAGNOSIS — Z48.298 AFTERCARE FOLLOWING ORGAN TRANSPLANT: ICD-10-CM

## 2021-06-03 DIAGNOSIS — Z94.0 HTN, KIDNEY TRANSPLANT RELATED: ICD-10-CM

## 2021-06-03 DIAGNOSIS — Z94.4 LIVER REPLACED BY TRANSPLANT (H): ICD-10-CM

## 2021-06-03 DIAGNOSIS — I15.1 HTN, KIDNEY TRANSPLANT RELATED: ICD-10-CM

## 2021-06-03 DIAGNOSIS — D84.9 IMMUNOSUPPRESSION (H): ICD-10-CM

## 2021-06-03 DIAGNOSIS — E83.42 HYPOMAGNESEMIA: ICD-10-CM

## 2021-06-03 DIAGNOSIS — Z94.0 KIDNEY REPLACED BY TRANSPLANT: ICD-10-CM

## 2021-06-03 DIAGNOSIS — D75.1 RELATIVE ERYTHROCYTOSIS: Primary | ICD-10-CM

## 2021-06-03 DIAGNOSIS — N18.31 STAGE 3A CHRONIC KIDNEY DISEASE (H): ICD-10-CM

## 2021-06-03 DIAGNOSIS — B27.00 EBV (EPSTEIN-BARR VIRUS) VIREMIA: ICD-10-CM

## 2021-06-03 PROCEDURE — 99214 OFFICE O/P EST MOD 30 MIN: CPT | Mod: 95 | Performed by: INTERNAL MEDICINE

## 2021-06-03 ASSESSMENT — PAIN SCALES - GENERAL: PAINLEVEL: NO PAIN (0)

## 2021-06-03 NOTE — LETTER
6/3/2021      RE: Cricket ISAAC April  [address]  [facility] [address]       Cricket is a 40 year old who is being evaluated via a billable video visit.      How would you like to obtain your AVS? Mail a copy  If the video visit is dropped, the invitation should be resent by: Send to e-mail at: [email or web address]  Will anyone else be joining your video visit? No    Video Start Time: 1409  Video-Visit Details    Type of service:  Video Visit    Video End Time:1427    Originating Location (pt. Location): Home    Distant Location (provider location):  [facility] [place]     Platform used for Video Visit: [employer]      CHRONIC TRANSPLANT NEPHROLOGY VISIT    Assessment & Plan   # DDKT (SLK): Stable   - Baseline Creatinine:  ~ 1.3-1.6   - Proteinuria: Normal (<0.2 grams)   - Date DSA Last Checked: Oct/2020      Latest DSA: No   - BK Viremia: Yes, detectable, but less than quantifiable   - Kidney Tx Biopsy: Oct 30, 2019; Result: No diagnostic evidence of acute rejection.  Some i-IFTA with inflamed area, but otherwise minimal interstitial fibrosis or tubular atrophy.             Sep 26, 2018; Result: No diagnostic evidence of acute rejection.  Mild arteriosclerosis.    # Liver Tx (SLK): Appears to be stable with normal LFTs.  Follows closely with Hepatology.    # Immunosuppression: Tacrolimus immediate release (goal 3-5), Mycophenolate mofetil (dose 500 mg every 12 hours) and Prednisone (dose 5 mg daily)   - Continue with intensive monitoring of immunosuppression for efficacy and toxicity.   - Changes: No    # Infection Prophylaxis:   - PJP: Sulfa/TMP (Bactrim)    # Hypertension: Not checked recently;  Goal BP: < 130/80   - Changes: Not at this time; Recommend patient check blood pressure at home on a regular basis, such as once every week or two, and follow up with PCP if above goal.    # Post-Transplant Erythrocytosis: Hgb: Stable;  On ACEI/ARB: Yes   Imaging: No    - Will check  bilateral native kidney and transplant kidney ultrasound to rule out renal cell cancer.    # Mineral Bone Disorder:   - Vitamin D; level: Not checked recently        On supplement: No  - Calcium; level: Normal        On supplement: No    # Electrolytes:   - Potassium; level: Normal        On supplement: No  - Magnesium; level: Normal        On supplement: Yes  - Bicarbonate; level: Normal        On supplement: No    # PTLD: Diagnosed in 2017, s/p treatment with R-CEOP with no evidence of recurrence.  Patient remains on slightly lower immunosuppression.    # Papillary Thyroid Cancer, s/p Thyroidectomy: Patient is doing fine with no evidence of recurrence.  He is on levothyroxine following his thyroidectomy.    # Palpitations: Patient was evaluated in ER for this and EKG with PVCs.  Heart monitor was reportedly normal.  No recent episodes after increase in beta blocker.    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    # BK Viremia: Detectable, but less than quantifiable BK PCR with last check.    # Skin Cancer Risk:    - Discussed sun protection and recommend regular follow up with Dermatology.    # Medical Compliance: Yes     # COVID-19 Virus Review: Discussed COVID-19 virus and the potential medical risks.  Reviewed preventative health recommendations, which includes washing hands for 20 seconds, avoid touching your face, and social distancing.  Asked patient to inform the transplant center if they are exposed or diagnosed with this virus.    # COVID Vaccine Completed: Yes    # Transplant History:  Etiology of Kidney Failure: Hepatorenal syndrome (HRS)  Tx: DDKT (K) and Liver Tx (K)  Transplant: 5/11/2016 (Kidney), 5/10/2016 (Liver)  Significant changes in immunosuppression: Decreased immunosuppression due to PTLD  Significant transplant-related complications: BK Viremia, EBV Viremia and Post-Transplant Lymphoproliferative Disorder (PTLD)    Transplant Office Phone Number: [phone]    Assessment  and plan was discussed with the patient and he voiced his understanding and agreement.    Return visit: Return in about 1 year (around 6/3/2022).    Jake Sidhu MD    Chief Complaint   Mr. Chen is a 40 year old here for kidney transplant and immunosuppression management.    History of Present Illness    Mr. Chen reports feeling good overall with some medical complaints.  No recent hospitalizations.  He did have an ER visit for heart palpitations and EKG showed PVCs.  He reports if felt like a panic attack with palpitations and shortness of breath that lasted about an hour.  Patient did have a heart monitor done, which was normal.  No recent episodes since increasing his metoprolol XL to bid dosing.  His energy level is good and remains normal.  He is active and does get some exercise.  Denies any chest pain or shortness of breath with exertion.  No leg swelling.    Appetite is good and he has gained a little weight, but is now trying to eat healthier.  No nausea, vomiting or diarrhea.  No fever, sweats or chills.    Home BP: Not checked    Problem List   Patient Active Problem List   Diagnosis     Atrial flutter (H)     Complete transposition of great vessels     Esophageal varices (H)     Insomnia     Alcoholic hepatitis     History of alcohol abuse     History of transposition of great vessels     Depression     Thrombocytopenia (H)     Pain medication agreement     [facility]     Liver replaced by transplant (H)     Kidney replaced by transplant     Immunosuppression (H)     Hypomagnesemia     Secondary renal hyperparathyroidism (H)     Chronic pain syndrome     Pain management contract signed     Aftercare following organ transplant     Post-transplant lymphoproliferative disorder (H)     Papillary thyroid carcinoma (H)     Advance directive discussed with patient     Acute alcoholic liver disease     Alcohol dependence (H)      HTN, kidney transplant related     Atrial fibrillation (H)     Postoperative hypothyroidism     Dermatitis, seborrheic     Chest pain     Chronic kidney disease, stage 3     EBV (Abelino-Barr virus) viremia       Allergies   Allergies   Allergen Reactions     Hydromorphone Itching       Medications   Current Outpatient Medications   Medication Sig     apixaban ANTICOAGULANT (ELIQUIS ANTICOAGULANT) 5 MG tablet Take 1 tablet (5 mg) by mouth 2 times daily Please call and schedule an appointment for additional refills, [phone]     ketoconazole (NIZORAL) 2 % cream Twice daily to areas of rash on face     levothyroxine (SYNTHROID/LEVOTHROID) 137 MCG tablet Take 1 tablet (137 mcg) by mouth daily     lisinopril (ZESTRIL) 10 MG tablet TAKE ONE TABLET BY MOUTH ONCE DAILY     LORazepam (ATIVAN) 1 MG tablet Take 1 tablet (1 mg) by mouth daily as needed (severe anxiety/panic/sleep)     magnesium oxide (MAG-OX) 400 MG tablet TAKE ONE TABLET BY MOUTH TWICE A DAY     metoprolol succinate ER (TOPROL-XL) 25 MG 24 hr tablet Take 1 tablet (25 mg) by mouth 2 times daily     mycophenolate (GENERIC EQUIVALENT) 250 MG capsule Take 2 capsules (500 mg) by mouth 2 times daily     sulfamethoxazole-trimethoprim (BACTRIM) 400-80 MG tablet Take 1 tablet by mouth daily     tacrolimus (GENERIC EQUIVALENT) 0.5 MG capsule TAKE ONE CAPSULE BY MOUTH EVERY MORNING (TOTAL DOSE 1.5MG EVERY MORNING AND 1MG EVERY EVENING)     tacrolimus (GENERIC EQUIVALENT) 1 MG capsule TAKE ONE CAPSULE BY MOUTH EVERY 12 HOURS (TOTAL DOSE 1.5MG EVERY MORNING AND 1MG EVERY EVENING     traZODone (DESYREL) 50 MG tablet Take 1 tablet (50 mg) by mouth at bedtime as needed, may repeat once for sleep     No current facility-administered medications for this visit.      There are no discontinued medications.    Physical Exam   Vital Signs: Deferred for this telemedicine visit.    GENERAL APPEARANCE: alert and no distress  HENT: no obvious abnormalities on appearance  RESP:  breathing appears unremarkable with normal rate, no audible wheezing or cough and no apparent shortness of breath with conversation  MS: extremities normal - no gross deformities noted  SKIN: no apparent rash and normal skin tone  NEURO: speech is clear with no obvious neurological deficits  PSYCH: mentation appears normal and affect normal    Data     Renal Latest Ref Rng & Units 5/6/2021 3/10/2021 1/5/2021   Na 133 - 144 mmol/L 138 140 139   K 3.4 - 5.3 mmol/L 4.5 4.0 4.3   Cl 94 - 109 mmol/L 105 110(H) 109   CO2 20 - 32 mmol/L 28 25 27   BUN 7 - 30 mg/dL 21 17 20   Cr 0.66 - 1.25 mg/dL 1.46(H) 1.20 1.32(H)   Glucose 70 - 99 mg/dL 107(H) 99 112(H)   Ca  8.5 - 10.1 mg/dL 9.6 9.2 9.0   Mg 1.6 - 2.3 mg/dL 1.8 2.0 2.1     Bone Health Latest Ref Rng & Units 10/2/2020 8/7/2017 8/7/2017   Phos 2.5 - 4.5 mg/dL 2.3(L) - -   PTHi 12 - 72 pg/mL - 167(H) 109(H)   Vit D Def 20 - 75 ug/L - - -     Heme Latest Ref Rng & Units 5/6/2021 3/10/2021 1/5/2021   WBC 4.0 - 11.0 10e9/L 4.9 4.9 5.3   Hgb 13.3 - 17.7 g/dL 17.1 16.1 16.6   Plt 150 - 450 10e9/L 162 137(L) 147(L)   ABSOLUTE NEUTROPHIL 1.6 - 8.3 10e9/L 3.3 - -   ABSOLUTE LYMPHOCYTES 0.8 - 5.3 10e9/L 1.0 - -   ABSOLUTE MONOCYTES 0.0 - 1.3 10e9/L 0.5 - -   ABSOLUTE EOSINOPHILS 0.0 - 0.7 10e9/L 0.1 - -   ABSOLUTE BASOPHILS 0.0 - 0.2 10e9/L 0.0 - -   ABS IMMATURE GRANULOCYTES 0 - 0.4 10e9/L - - -   ABSOLUTE NUCLEATED RBC - - - -     Liver Latest Ref Rng & Units 5/6/2021 3/10/2021 1/5/2021   AP 40 - 150 U/L 72 64 71   TBili 0.2 - 1.3 mg/dL 0.6 0.5 0.4   DBili 0.0 - 0.2 mg/dL 0.2 0.1 0.1   ALT 0 - 70 U/L 34 27 45   AST 0 - 45 U/L 15 16 22   Tot Protein 6.8 - 8.8 g/dL 7.9 7.1 7.1   Albumin 3.4 - 5.0 g/dL 4.3 4.0 4.0     Pancreas Latest Ref Rng & Units 3/6/2018 5/12/2016 5/10/2016   A1C 4.3 - 6.0 % - 5.1 -   Amylase 30 - 110 U/L - 47 100   Lipase 73 - 393 U/L 95 125 -     Iron studies Latest Ref Rng & Units 7/14/2016 6/21/2014   Iron 35 - 180 ug/dL 84 101   Iron sat 15 - 46 % 29 71(H)    Ferritin 26 - 388 ng/mL 171 -     [facility] Txp Virology Latest Ref Rng & Units 6/2/2020 1/7/2020 10/30/2019   CVM DNA Quant - - - -   CMV QUANT IU/ML CMVND:CMV DNA Not Detected [IU]/mL - - -   LOG IU/ML OF CMVQNT <2.1 [Log:IU]/mL - - -   BK Spec - - - Plasma   BK Res BKNEG:BK Virus DNA Not Detected copies/mL - - <500(A)   BK Log <2.7 Log copies/mL - - <2.7   EBV CAPSID ANTIBODY IGG 0.0 - 0.8 AI - - -   EBV DNA COPIES/ML EBVNEG:EBV DNA Not Detected [Copies]/mL EBV DNA Not Detected <500(A) -   EBV DNA LOG OF COPIES <2.7 [Log:copies]/mL Not Calculated <2.7 -   Hep B Core NR - - -        Recent Labs   Lab Test 01/05/21  1118 03/10/21  1117 05/06/21  1141   DOSTAC 1/04/21 2315 03.09.2021 1120PM  [id number] @ 2345   TACROL 4.9* 4.1* 4.1*     Recent Labs   Lab Test 05/19/16  0714   DOSMPA 1,900   MPACID <0.25*   MPAG 51.0       Jake Sidhu MD

## 2021-06-03 NOTE — LETTER
6/3/2021       RE: Cricket Chen  [address]  [facility] [address]     Dear Colleague,    Thank you for referring your patient, Cricket Chen, to the [facility] [place] at [facility]. Please see a copy of my visit note below.    Cricket is a 40 year old who is being evaluated via a billable video visit.      How would you like to obtain your AVS? Mail a copy  If the video visit is dropped, the invitation should be resent by: Send to e-mail at: [email or web address]  Will anyone else be joining your video visit? No    Video Start Time: 1409  Video-Visit Details    Type of service:  Video Visit    Video End Time:1427    Originating Location (pt. Location): Home    Distant Location (provider location):  [facility] [place]     Platform used for Video Visit: [employer]      CHRONIC TRANSPLANT NEPHROLOGY VISIT    Assessment & Plan   # DDKT (SLK): Stable   - Baseline Creatinine:  ~ 1.3-1.6   - Proteinuria: Normal (<0.2 grams)   - Date DSA Last Checked: Oct/2020      Latest DSA: No   - BK Viremia: Yes, detectable, but less than quantifiable   - Kidney Tx Biopsy: Oct 30, 2019; Result: No diagnostic evidence of acute rejection.  Some i-IFTA with inflamed area, but otherwise minimal interstitial fibrosis or tubular atrophy.             Sep 26, 2018; Result: No diagnostic evidence of acute rejection.  Mild arteriosclerosis.    # Liver Tx (SLK): Appears to be stable with normal LFTs.  Follows closely with Hepatology.    # Immunosuppression: Tacrolimus immediate release (goal 3-5), Mycophenolate mofetil (dose 500 mg every 12 hours) and Prednisone (dose 5 mg daily)   - Continue with intensive monitoring of immunosuppression for efficacy and toxicity.   - Changes: No    # Infection Prophylaxis:   - PJP: Sulfa/TMP (Bactrim)    # Hypertension: Not checked recently;  Goal BP: < 130/80   - Changes: Not at this time;  Recommend patient check blood pressure at home on a regular basis, such as once every week or two, and follow up with PCP if above goal.    # Post-Transplant Erythrocytosis: Hgb: Stable;  On ACEI/ARB: Yes   Imaging: No    - Will check bilateral native kidney and transplant kidney ultrasound to rule out renal cell cancer.    # Mineral Bone Disorder:   - Vitamin D; level: Not checked recently        On supplement: No  - Calcium; level: Normal        On supplement: No    # Electrolytes:   - Potassium; level: Normal        On supplement: No  - Magnesium; level: Normal        On supplement: Yes  - Bicarbonate; level: Normal        On supplement: No    # PTLD: Diagnosed in 2017, s/p treatment with R-CEOP with no evidence of recurrence.  Patient remains on slightly lower immunosuppression.    # Papillary Thyroid Cancer, s/p Thyroidectomy: Patient is doing fine with no evidence of recurrence.  He is on levothyroxine following his thyroidectomy.    # Palpitations: Patient was evaluated in ER for this and EKG with PVCs.  Heart monitor was reportedly normal.  No recent episodes after increase in beta blocker.    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    # BK Viremia: Detectable, but less than quantifiable BK PCR with last check.    # Skin Cancer Risk:    - Discussed sun protection and recommend regular follow up with Dermatology.    # Medical Compliance: Yes     # COVID-19 Virus Review: Discussed COVID-19 virus and the potential medical risks.  Reviewed preventative health recommendations, which includes washing hands for 20 seconds, avoid touching your face, and social distancing.  Asked patient to inform the transplant center if they are exposed or diagnosed with this virus.    # COVID Vaccine Completed: Yes    # Transplant History:  Etiology of Kidney Failure: Hepatorenal syndrome (HRS)  Tx: DDKT (\[facility]\"") and Liver Tx (\[facility]")  Transplant: 5/11/2016 (Kidney), 5/10/2016 (Liver)  Significant changes in  immunosuppression: Decreased immunosuppression due to PTLD  Significant transplant-related complications: BK Viremia, EBV Viremia and Post-Transplant Lymphoproliferative Disorder (PTLD)    Transplant Office Phone Number: [phone]    Assessment and plan was discussed with the patient and he voiced his understanding and agreement.    Return visit: Return in about 1 year (around 6/3/2022).    Jake Sidhu MD    Chief Complaint   Mr. Chen is a 40 year old here for kidney transplant and immunosuppression management.    History of Present Illness    Mr. Chen reports feeling good overall with some medical complaints.  No recent hospitalizations.  He did have an ER visit for heart palpitations and EKG showed PVCs.  He reports if felt like a panic attack with palpitations and shortness of breath that lasted about an hour.  Patient did have a heart monitor done, which was normal.  No recent episodes since increasing his metoprolol XL to bid dosing.  His energy level is good and remains normal.  He is active and does get some exercise.  Denies any chest pain or shortness of breath with exertion.  No leg swelling.    Appetite is good and he has gained a little weight, but is now trying to eat healthier.  No nausea, vomiting or diarrhea.  No fever, sweats or chills.    Home BP: Not checked    Problem List   Patient Active Problem List   Diagnosis     Atrial flutter (H)     Complete transposition of great vessels     Esophageal varices (H)     Insomnia     Alcoholic hepatitis     History of alcohol abuse     History of transposition of great vessels     Depression     Thrombocytopenia (H)     Pain medication agreement     [facility]     Liver replaced by transplant (H)     Kidney replaced by transplant     Immunosuppression (H)     Hypomagnesemia     Secondary renal hyperparathyroidism (H)     Chronic pain syndrome     Pain management  contract signed     Aftercare following organ transplant     Post-transplant lymphoproliferative disorder (H)     Papillary thyroid carcinoma (H)     Advance directive discussed with patient     Acute alcoholic liver disease     Alcohol dependence (H)     HTN, kidney transplant related     Atrial fibrillation (H)     Postoperative hypothyroidism     Dermatitis, seborrheic     Chest pain     Chronic kidney disease, stage 3     EBV (Abelino-Barr virus) viremia       Allergies   Allergies   Allergen Reactions     Hydromorphone Itching       Medications   Current Outpatient Medications   Medication Sig     apixaban ANTICOAGULANT (ELIQUIS ANTICOAGULANT) 5 MG tablet Take 1 tablet (5 mg) by mouth 2 times daily Please call and schedule an appointment for additional refills, [phone]     ketoconazole (NIZORAL) 2 % cream Twice daily to areas of rash on face     levothyroxine (SYNTHROID/LEVOTHROID) 137 MCG tablet Take 1 tablet (137 mcg) by mouth daily     lisinopril (ZESTRIL) 10 MG tablet TAKE ONE TABLET BY MOUTH ONCE DAILY     LORazepam (ATIVAN) 1 MG tablet Take 1 tablet (1 mg) by mouth daily as needed (severe anxiety/panic/sleep)     magnesium oxide (MAG-OX) 400 MG tablet TAKE ONE TABLET BY MOUTH TWICE A DAY     metoprolol succinate ER (TOPROL-XL) 25 MG 24 hr tablet Take 1 tablet (25 mg) by mouth 2 times daily     mycophenolate (GENERIC EQUIVALENT) 250 MG capsule Take 2 capsules (500 mg) by mouth 2 times daily     sulfamethoxazole-trimethoprim (BACTRIM) 400-80 MG tablet Take 1 tablet by mouth daily     tacrolimus (GENERIC EQUIVALENT) 0.5 MG capsule TAKE ONE CAPSULE BY MOUTH EVERY MORNING (TOTAL DOSE 1.5MG EVERY MORNING AND 1MG EVERY EVENING)     tacrolimus (GENERIC EQUIVALENT) 1 MG capsule TAKE ONE CAPSULE BY MOUTH EVERY 12 HOURS (TOTAL DOSE 1.5MG EVERY MORNING AND 1MG EVERY EVENING     traZODone (DESYREL) 50 MG tablet Take 1 tablet (50 mg) by mouth at bedtime as needed, may repeat once for sleep     No current  facility-administered medications for this visit.      There are no discontinued medications.    Physical Exam   Vital Signs: Deferred for this telemedicine visit.    GENERAL APPEARANCE: alert and no distress  HENT: no obvious abnormalities on appearance  RESP: breathing appears unremarkable with normal rate, no audible wheezing or cough and no apparent shortness of breath with conversation  MS: extremities normal - no gross deformities noted  SKIN: no apparent rash and normal skin tone  NEURO: speech is clear with no obvious neurological deficits  PSYCH: mentation appears normal and affect normal    Data     Renal Latest Ref Rng & Units 5/6/2021 3/10/2021 1/5/2021   Na 133 - 144 mmol/L 138 140 139   K 3.4 - 5.3 mmol/L 4.5 4.0 4.3   Cl 94 - 109 mmol/L 105 110(H) 109   CO2 20 - 32 mmol/L 28 25 27   BUN 7 - 30 mg/dL 21 17 20   Cr 0.66 - 1.25 mg/dL 1.46(H) 1.20 1.32(H)   Glucose 70 - 99 mg/dL 107(H) 99 112(H)   Ca  8.5 - 10.1 mg/dL 9.6 9.2 9.0   Mg 1.6 - 2.3 mg/dL 1.8 2.0 2.1     Bone Health Latest Ref Rng & Units 10/2/2020 8/7/2017 8/7/2017   Phos 2.5 - 4.5 mg/dL 2.3(L) - -   PTHi 12 - 72 pg/mL - 167(H) 109(H)   Vit D Def 20 - 75 ug/L - - -     Heme Latest Ref Rng & Units 5/6/2021 3/10/2021 1/5/2021   WBC 4.0 - 11.0 10e9/L 4.9 4.9 5.3   Hgb 13.3 - 17.7 g/dL 17.1 16.1 16.6   Plt 150 - 450 10e9/L 162 137(L) 147(L)   ABSOLUTE NEUTROPHIL 1.6 - 8.3 10e9/L 3.3 - -   ABSOLUTE LYMPHOCYTES 0.8 - 5.3 10e9/L 1.0 - -   ABSOLUTE MONOCYTES 0.0 - 1.3 10e9/L 0.5 - -   ABSOLUTE EOSINOPHILS 0.0 - 0.7 10e9/L 0.1 - -   ABSOLUTE BASOPHILS 0.0 - 0.2 10e9/L 0.0 - -   ABS IMMATURE GRANULOCYTES 0 - 0.4 10e9/L - - -   ABSOLUTE NUCLEATED RBC - - - -     Liver Latest Ref Rng & Units 5/6/2021 3/10/2021 1/5/2021   AP 40 - 150 U/L 72 64 71   TBili 0.2 - 1.3 mg/dL 0.6 0.5 0.4   DBili 0.0 - 0.2 mg/dL 0.2 0.1 0.1   ALT 0 - 70 U/L 34 27 45   AST 0 - 45 U/L 15 16 22   Tot Protein 6.8 - 8.8 g/dL 7.9 7.1 7.1   Albumin 3.4 - 5.0 g/dL 4.3 4.0 4.0      Pancreas Latest Ref Rng & Units 3/6/2018 5/12/2016 5/10/2016   A1C 4.3 - 6.0 % - 5.1 -   Amylase 30 - 110 U/L - 47 100   Lipase 73 - 393 U/L 95 125 -     Iron studies Latest Ref Rng & Units 7/14/2016 6/21/2014   Iron 35 - 180 ug/dL 84 101   Iron sat 15 - 46 % 29 71(H)   Ferritin 26 - 388 ng/mL 171 -     [facility] Txp Virology Latest Ref Rng & Units 6/2/2020 1/7/2020 10/30/2019   CVM DNA Quant - - - -   CMV QUANT IU/ML CMVND:CMV DNA Not Detected [IU]/mL - - -   LOG IU/ML OF CMVQNT <2.1 [Log:IU]/mL - - -   BK Spec - - - Plasma   BK Res BKNEG:BK Virus DNA Not Detected copies/mL - - <500(A)   BK Log <2.7 Log copies/mL - - <2.7   EBV CAPSID ANTIBODY IGG 0.0 - 0.8 AI - - -   EBV DNA COPIES/ML EBVNEG:EBV DNA Not Detected [Copies]/mL EBV DNA Not Detected <500(A) -   EBV DNA LOG OF COPIES <2.7 [Log:copies]/mL Not Calculated <2.7 -   Hep B Core NR - - -        Recent Labs   Lab Test 01/05/21  1118 03/10/21  1117 05/06/21  1141   DOSTAC 1/04/21 2315 03.09.2021 1120PM  [id number] @ 2345   TACROL 4.9* 4.1* 4.1*     Recent Labs   Lab Test 05/19/16  0714   DOSMPA 1,900   MPACID <0.25*   MPAG 51.0       Again, thank you for allowing me to participate in the care of your patient.      Sincerely,    Jake Sidhu MD

## 2021-06-03 NOTE — PROGRESS NOTES
Cricket is a 40 year old who is being evaluated via a billable video visit.      How would you like to obtain your AVS? Mail a copy  If the video visit is dropped, the invitation should be resent by: Send to e-mail at: [email or web address]  Will anyone else be joining your video visit? No    Video Start Time: 1409  Video-Visit Details    Type of service:  Video Visit    Video End Time:1427    Originating Location (pt. Location): Home    Distant Location (provider location):  [facility] [place]     Platform used for Video Visit: Proton Therapy      CHRONIC TRANSPLANT NEPHROLOGY VISIT    Assessment & Plan   # DDKT (SLK): Stable   - Baseline Creatinine:  ~ 1.3-1.6   - Proteinuria: Normal (<0.2 grams)   - Date DSA Last Checked: Oct/2020      Latest DSA: No   - BK Viremia: Yes, detectable, but less than quantifiable   - Kidney Tx Biopsy: Oct 30, 2019; Result: No diagnostic evidence of acute rejection.  Some i-IFTA with inflamed area, but otherwise minimal interstitial fibrosis or tubular atrophy.             Sep 26, 2018; Result: No diagnostic evidence of acute rejection.  Mild arteriosclerosis.    # Liver Tx (SLK): Appears to be stable with normal LFTs.  Follows closely with Hepatology.    # Immunosuppression: Tacrolimus immediate release (goal 3-5), Mycophenolate mofetil (dose 500 mg every 12 hours) and Prednisone (dose 5 mg daily)   - Continue with intensive monitoring of immunosuppression for efficacy and toxicity.   - Changes: No    # Infection Prophylaxis:   - PJP: Sulfa/TMP (Bactrim)    # Hypertension: Not checked recently;  Goal BP: < 130/80   - Changes: Not at this time; Recommend patient check blood pressure at home on a regular basis, such as once every week or two, and follow up with PCP if above goal.    # Post-Transplant Erythrocytosis: Hgb: Stable;  On ACEI/ARB: Yes   Imaging: No    - Will check bilateral native kidney and transplant kidney ultrasound to rule out renal cell cancer.    #  Mineral Bone Disorder:   - Vitamin D; level: Not checked recently        On supplement: No  - Calcium; level: Normal        On supplement: No    # Electrolytes:   - Potassium; level: Normal        On supplement: No  - Magnesium; level: Normal        On supplement: Yes  - Bicarbonate; level: Normal        On supplement: No    # PTLD: Diagnosed in 2017, s/p treatment with R-CEOP with no evidence of recurrence.  Patient remains on slightly lower immunosuppression.    # Papillary Thyroid Cancer, s/p Thyroidectomy: Patient is doing fine with no evidence of recurrence.  He is on levothyroxine following his thyroidectomy.    # Palpitations: Patient was evaluated in ER for this and EKG with PVCs.  Heart monitor was reportedly normal.  No recent episodes after increase in beta blocker.    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    # BK Viremia: Detectable, but less than quantifiable BK PCR with last check.    # Skin Cancer Risk:    - Discussed sun protection and recommend regular follow up with Dermatology.    # Medical Compliance: Yes     # COVID-19 Virus Review: Discussed COVID-19 virus and the potential medical risks.  Reviewed preventative health recommendations, which includes washing hands for 20 seconds, avoid touching your face, and social distancing.  Asked patient to inform the transplant center if they are exposed or diagnosed with this virus.    # COVID Vaccine Completed: Yes    # Transplant History:  Etiology of Kidney Failure: Hepatorenal syndrome (HRS)  Tx: DDKT (K) and Liver Tx (K)  Transplant: 5/11/2016 (Kidney), 5/10/2016 (Liver)  Significant changes in immunosuppression: Decreased immunosuppression due to PTLD  Significant transplant-related complications: BK Viremia, EBV Viremia and Post-Transplant Lymphoproliferative Disorder (PTLD)    Transplant Office Phone Number: [phone]    Assessment and plan was discussed with the patient and he voiced his understanding and  agreement.    Return visit: Return in about 1 year (around 6/3/2022).    Jake Sidhu MD    Chief Complaint   Mr. Chen is a 40 year old here for kidney transplant and immunosuppression management.    History of Present Illness    Mr. Chen reports feeling good overall with some medical complaints.  No recent hospitalizations.  He did have an ER visit for heart palpitations and EKG showed PVCs.  He reports if felt like a panic attack with palpitations and shortness of breath that lasted about an hour.  Patient did have a heart monitor done, which was normal.  No recent episodes since increasing his metoprolol XL to bid dosing.  His energy level is good and remains normal.  He is active and does get some exercise.  Denies any chest pain or shortness of breath with exertion.  No leg swelling.    Appetite is good and he has gained a little weight, but is now trying to eat healthier.  No nausea, vomiting or diarrhea.  No fever, sweats or chills.    Home BP: Not checked    Problem List   Patient Active Problem List   Diagnosis     Atrial flutter (H)     Complete transposition of great vessels     Esophageal varices (H)     Insomnia     Alcoholic hepatitis     History of alcohol abuse     History of transposition of great vessels     Depression     Thrombocytopenia (H)     Pain medication agreement     Patient [facility]     Liver replaced by transplant (H)     Kidney replaced by transplant     Immunosuppression (H)     Hypomagnesemia     Secondary renal hyperparathyroidism (H)     Chronic pain syndrome     Pain management contract signed     Aftercare following organ transplant     Post-transplant lymphoproliferative disorder (H)     Papillary thyroid carcinoma (H)     Advance directive discussed with patient     Acute alcoholic liver disease     Alcohol dependence (H)     HTN, kidney transplant related     Atrial fibrillation (H)      Postoperative hypothyroidism     Dermatitis, seborrheic     Chest pain     Chronic kidney disease, stage 3     EBV (Abelino-Barr virus) viremia       Allergies   Allergies   Allergen Reactions     Hydromorphone Itching       Medications   Current Outpatient Medications   Medication Sig     apixaban ANTICOAGULANT (ELIQUIS ANTICOAGULANT) 5 MG tablet Take 1 tablet (5 mg) by mouth 2 times daily Please call and schedule an appointment for additional refills, [phone]     ketoconazole (NIZORAL) 2 % cream Twice daily to areas of rash on face     levothyroxine (SYNTHROID/LEVOTHROID) 137 MCG tablet Take 1 tablet (137 mcg) by mouth daily     lisinopril (ZESTRIL) 10 MG tablet TAKE ONE TABLET BY MOUTH ONCE DAILY     LORazepam (ATIVAN) 1 MG tablet Take 1 tablet (1 mg) by mouth daily as needed (severe anxiety/panic/sleep)     magnesium oxide (MAG-OX) 400 MG tablet TAKE ONE TABLET BY MOUTH TWICE A DAY     metoprolol succinate ER (TOPROL-XL) 25 MG 24 hr tablet Take 1 tablet (25 mg) by mouth 2 times daily     mycophenolate (GENERIC EQUIVALENT) 250 MG capsule Take 2 capsules (500 mg) by mouth 2 times daily     sulfamethoxazole-trimethoprim (BACTRIM) 400-80 MG tablet Take 1 tablet by mouth daily     tacrolimus (GENERIC EQUIVALENT) 0.5 MG capsule TAKE ONE CAPSULE BY MOUTH EVERY MORNING (TOTAL DOSE 1.5MG EVERY MORNING AND 1MG EVERY EVENING)     tacrolimus (GENERIC EQUIVALENT) 1 MG capsule TAKE ONE CAPSULE BY MOUTH EVERY 12 HOURS (TOTAL DOSE 1.5MG EVERY MORNING AND 1MG EVERY EVENING     traZODone (DESYREL) 50 MG tablet Take 1 tablet (50 mg) by mouth at bedtime as needed, may repeat once for sleep     No current facility-administered medications for this visit.      There are no discontinued medications.    Physical Exam   Vital Signs: Deferred for this telemedicine visit.    GENERAL APPEARANCE: alert and no distress  HENT: no obvious abnormalities on appearance  RESP: breathing appears unremarkable with normal rate, no audible  wheezing or cough and no apparent shortness of breath with conversation  MS: extremities normal - no gross deformities noted  SKIN: no apparent rash and normal skin tone  NEURO: speech is clear with no obvious neurological deficits  PSYCH: mentation appears normal and affect normal    Data     Renal Latest Ref Rng & Units 5/6/2021 3/10/2021 1/5/2021   Na 133 - 144 mmol/L 138 140 139   K 3.4 - 5.3 mmol/L 4.5 4.0 4.3   Cl 94 - 109 mmol/L 105 110(H) 109   CO2 20 - 32 mmol/L 28 25 27   BUN 7 - 30 mg/dL 21 17 20   Cr 0.66 - 1.25 mg/dL 1.46(H) 1.20 1.32(H)   Glucose 70 - 99 mg/dL 107(H) 99 112(H)   Ca  8.5 - 10.1 mg/dL 9.6 9.2 9.0   Mg 1.6 - 2.3 mg/dL 1.8 2.0 2.1     Bone Health Latest Ref Rng & Units 10/2/2020 8/7/2017 8/7/2017   Phos 2.5 - 4.5 mg/dL 2.3(L) - -   PTHi 12 - 72 pg/mL - 167(H) 109(H)   Vit D Def 20 - 75 ug/L - - -     Heme Latest Ref Rng & Units 5/6/2021 3/10/2021 1/5/2021   WBC 4.0 - 11.0 10e9/L 4.9 4.9 5.3   Hgb 13.3 - 17.7 g/dL 17.1 16.1 16.6   Plt 150 - 450 10e9/L 162 137(L) 147(L)   ABSOLUTE NEUTROPHIL 1.6 - 8.3 10e9/L 3.3 - -   ABSOLUTE LYMPHOCYTES 0.8 - 5.3 10e9/L 1.0 - -   ABSOLUTE MONOCYTES 0.0 - 1.3 10e9/L 0.5 - -   ABSOLUTE EOSINOPHILS 0.0 - 0.7 10e9/L 0.1 - -   ABSOLUTE BASOPHILS 0.0 - 0.2 10e9/L 0.0 - -   ABS IMMATURE GRANULOCYTES 0 - 0.4 10e9/L - - -   ABSOLUTE NUCLEATED RBC - - - -     Liver Latest Ref Rng & Units 5/6/2021 3/10/2021 1/5/2021   AP 40 - 150 U/L 72 64 71   TBili 0.2 - 1.3 mg/dL 0.6 0.5 0.4   DBili 0.0 - 0.2 mg/dL 0.2 0.1 0.1   ALT 0 - 70 U/L 34 27 45   AST 0 - 45 U/L 15 16 22   Tot Protein 6.8 - 8.8 g/dL 7.9 7.1 7.1   Albumin 3.4 - 5.0 g/dL 4.3 4.0 4.0     Pancreas Latest Ref Rng & Units 3/6/2018 5/12/2016 5/10/2016   A1C 4.3 - 6.0 % - 5.1 -   Amylase 30 - 110 U/L - 47 100   Lipase 73 - 393 U/L 95 125 -     Iron studies Latest Ref Rng & Units 7/14/2016 6/21/2014   Iron 35 - 180 ug/dL 84 101   Iron sat 15 - 46 % 29 71(H)   Ferritin 26 - 388 ng/mL 171 -     [facility] Txp Virology Latest  Ref Rng & Units 6/2/2020 1/7/2020 10/30/2019   CVM DNA Quant - - - -   CMV QUANT IU/ML CMVND:CMV DNA Not Detected [IU]/mL - - -   LOG IU/ML OF CMVQNT <2.1 [Log:IU]/mL - - -   BK Spec - - - Plasma   BK Res BKNEG:BK Virus DNA Not Detected copies/mL - - <500(A)   BK Log <2.7 Log copies/mL - - <2.7   EBV CAPSID ANTIBODY IGG 0.0 - 0.8 AI - - -   EBV DNA COPIES/ML EBVNEG:EBV DNA Not Detected [Copies]/mL EBV DNA Not Detected <500(A) -   EBV DNA LOG OF COPIES <2.7 [Log:copies]/mL Not Calculated <2.7 -   Hep B Core NR - - -        Recent Labs   Lab Test 01/05/21  1118 03/10/21  1117 05/06/21  1141   DOSTAC 1/04/21 2315 03.09.2021 1120PM  [id number] @ 2345   TACROL 4.9* 4.1* 4.1*     Recent Labs   Lab Test 05/19/16  0714   DOSMPA 1,900   MPACID <0.25*   MPAG 51.0

## 2021-06-08 ENCOUNTER — ANCILLARY PROCEDURE (OUTPATIENT)
Dept: ULTRASOUND IMAGING | Facility: CLINIC | Age: 41
End: 2021-06-08
Attending: INTERNAL MEDICINE
Payer: COMMERCIAL

## 2021-06-08 DIAGNOSIS — D75.1 RELATIVE ERYTHROCYTOSIS: ICD-10-CM

## 2021-06-08 DIAGNOSIS — Z94.0 KIDNEY REPLACED BY TRANSPLANT: ICD-10-CM

## 2021-06-08 PROCEDURE — 76776 US EXAM K TRANSPL W/DOPPLER: CPT | Performed by: RADIOLOGY

## 2021-06-08 PROCEDURE — 76770 US EXAM ABDO BACK WALL COMP: CPT | Performed by: RADIOLOGY

## 2021-06-09 PROBLEM — I51.89 OTHER ILL-DEFINED HEART DISEASES: Status: RESOLVED | Noted: 2019-04-15 | Resolved: 2021-06-09

## 2021-06-09 PROBLEM — R06.02 SHORTNESS OF BREATH: Status: RESOLVED | Noted: 2019-04-15 | Resolved: 2021-06-09

## 2021-06-09 PROBLEM — Z94.0 HTN, KIDNEY TRANSPLANT RELATED: Status: ACTIVE | Noted: 2018-01-16

## 2021-06-09 PROBLEM — B27.00 EBV (EPSTEIN-BARR VIRUS) VIREMIA: Status: ACTIVE | Noted: 2021-06-09

## 2021-06-09 PROBLEM — R22.31 ARM MASS, RIGHT: Status: RESOLVED | Noted: 2018-07-25 | Resolved: 2021-06-09

## 2021-06-17 DIAGNOSIS — F41.0 PANIC DISORDER WITHOUT AGORAPHOBIA: ICD-10-CM

## 2021-06-17 RX ORDER — LORAZEPAM 1 MG/1
1 TABLET ORAL DAILY PRN
Qty: 15 TABLET | Refills: 0 | Status: SHIPPED | OUTPATIENT
Start: 2021-06-17 | End: 2022-10-03

## 2021-06-17 NOTE — TELEPHONE ENCOUNTER
Date of Last Office Visit:2-23-21  Date of Next Office Visit: 7-13-21  No shows since last visit: 0  Cancellations since last visit: 0    Medication requested: ativan Date last ordered: 1-20-21 Qty: 15 Refills: 0     [place] of [place] ?: yes  Medication last filled date: ativan Qty filled: 1-22-21  Other [place] substance on [place] ?: no  I  Lapse in medication adherence greater than 5 days?: yes, prn    Medication refill request verified as identical to current order?: yes  Result of Last DAM, VPA, Li+ Level, CBC, or Carbamazepine Level (at or since last visit): N/A    []Medication refilled per  Medication Refill in Ambulatory Care  policy.  [x]Medication unable to be refilled by RN due to criteria not met as indicated below:    []Eligibility - not seen in the last year   []Supervision - no future appointment   []Compliance - no shows, cancellations or lapse in therapy   []Verification - order discrepancy   [x]Controlled medication   []Medication not included in policy   []90-day supply request   []Other

## 2021-06-21 DIAGNOSIS — Z94.4 LIVER REPLACED BY TRANSPLANT (H): ICD-10-CM

## 2021-06-21 DIAGNOSIS — K70.10 ALCOHOLIC HEPATITIS WITHOUT ASCITES (H): Primary | ICD-10-CM

## 2021-06-21 DIAGNOSIS — Z13.220 LIPID SCREENING: ICD-10-CM

## 2021-06-22 DIAGNOSIS — Z94.0 KIDNEY TRANSPLANTED: ICD-10-CM

## 2021-06-22 RX ORDER — LISINOPRIL 10 MG/1
TABLET ORAL
Qty: 90 TABLET | Refills: 1 | Status: SHIPPED | OUTPATIENT
Start: 2021-06-22 | End: 2021-11-23 | Stop reason: DRUGHIGH

## 2021-07-06 DIAGNOSIS — Z94.4 LIVER REPLACED BY TRANSPLANT (H): ICD-10-CM

## 2021-07-06 DIAGNOSIS — K70.10 ALCOHOLIC HEPATITIS WITHOUT ASCITES (H): ICD-10-CM

## 2021-07-06 DIAGNOSIS — Z13.220 LIPID SCREENING: ICD-10-CM

## 2021-07-06 LAB
ALBUMIN SERPL-MCNC: 4.2 G/DL (ref 3.4–5)
ALBUMIN UR-MCNC: NEGATIVE MG/DL
ALP SERPL-CCNC: 56 U/L (ref 40–150)
ALT SERPL W P-5'-P-CCNC: 29 U/L (ref 0–70)
ANION GAP SERPL CALCULATED.3IONS-SCNC: 5 MMOL/L (ref 3–14)
APPEARANCE UR: CLEAR
AST SERPL W P-5'-P-CCNC: 22 U/L (ref 0–45)
BILIRUB DIRECT SERPL-MCNC: 0.2 MG/DL (ref 0–0.2)
BILIRUB SERPL-MCNC: 1.1 MG/DL (ref 0.2–1.3)
BILIRUB UR QL STRIP: NEGATIVE
BUN SERPL-MCNC: 16 MG/DL (ref 7–30)
CALCIUM SERPL-MCNC: 9.2 MG/DL (ref 8.5–10.1)
CHLORIDE SERPL-SCNC: 103 MMOL/L (ref 94–109)
CHOLEST SERPL-MCNC: 207 MG/DL
CO2 SERPL-SCNC: 27 MMOL/L (ref 20–32)
COLOR UR AUTO: YELLOW
CREAT SERPL-MCNC: 1.27 MG/DL (ref 0.66–1.25)
ERYTHROCYTE [DISTWIDTH] IN BLOOD BY AUTOMATED COUNT: 12.9 % (ref 10–15)
GFR SERPL CREATININE-BSD FRML MDRD: 70 ML/MIN/{1.73_M2}
GLUCOSE SERPL-MCNC: 93 MG/DL (ref 70–99)
GLUCOSE UR STRIP-MCNC: NEGATIVE MG/DL
HCT VFR BLD AUTO: 45.9 % (ref 40–53)
HDLC SERPL-MCNC: 38 MG/DL
HGB BLD-MCNC: 16.1 G/DL (ref 13.3–17.7)
HGB UR QL STRIP: NEGATIVE
KETONES UR STRIP-MCNC: NEGATIVE MG/DL
LDLC SERPL CALC-MCNC: 134 MG/DL
LEUKOCYTE ESTERASE UR QL STRIP: NEGATIVE
MAGNESIUM SERPL-MCNC: 1.6 MG/DL (ref 1.6–2.3)
MCH RBC QN AUTO: 30.8 PG (ref 26.5–33)
MCHC RBC AUTO-ENTMCNC: 35.1 G/DL (ref 31.5–36.5)
MCV RBC AUTO: 88 FL (ref 78–100)
NITRATE UR QL: NEGATIVE
NONHDLC SERPL-MCNC: 169 MG/DL
PH UR STRIP: 6 PH (ref 5–7)
PLATELET # BLD AUTO: 128 10E9/L (ref 150–450)
POTASSIUM SERPL-SCNC: 4 MMOL/L (ref 3.4–5.3)
PROT SERPL-MCNC: 6.9 G/DL (ref 6.8–8.8)
PROT UR-MCNC: 0.06 G/L
PROT/CREAT 24H UR: 0.12 G/G CR (ref 0–0.2)
RBC # BLD AUTO: 5.23 10E12/L (ref 4.4–5.9)
SODIUM SERPL-SCNC: 135 MMOL/L (ref 133–144)
SOURCE: NORMAL
SP GR UR STRIP: <=1.005 (ref 1–1.03)
TACROLIMUS BLD-MCNC: 3.3 UG/L (ref 5–15)
TME LAST DOSE: 1120 H
TRIGL SERPL-MCNC: 175 MG/DL
UROBILINOGEN UR STRIP-ACNC: 0.2 EU/DL (ref 0.2–1)
WBC # BLD AUTO: 4.9 10E9/L (ref 4–11)

## 2021-07-06 PROCEDURE — 80048 BASIC METABOLIC PNL TOTAL CA: CPT | Performed by: INTERNAL MEDICINE

## 2021-07-06 PROCEDURE — 36415 COLL VENOUS BLD VENIPUNCTURE: CPT | Performed by: INTERNAL MEDICINE

## 2021-07-06 PROCEDURE — 85027 COMPLETE CBC AUTOMATED: CPT | Performed by: INTERNAL MEDICINE

## 2021-07-06 PROCEDURE — 80076 HEPATIC FUNCTION PANEL: CPT | Performed by: INTERNAL MEDICINE

## 2021-07-06 PROCEDURE — 84156 ASSAY OF PROTEIN URINE: CPT | Mod: 59 | Performed by: INTERNAL MEDICINE

## 2021-07-06 PROCEDURE — 80197 ASSAY OF TACROLIMUS: CPT | Performed by: INTERNAL MEDICINE

## 2021-07-06 PROCEDURE — 83735 ASSAY OF MAGNESIUM: CPT | Performed by: INTERNAL MEDICINE

## 2021-07-06 PROCEDURE — 80061 LIPID PANEL: CPT | Performed by: INTERNAL MEDICINE

## 2021-07-06 PROCEDURE — 99000 SPECIMEN HANDLING OFFICE-LAB: CPT | Performed by: INTERNAL MEDICINE

## 2021-07-06 PROCEDURE — 80321 ALCOHOLS BIOMARKERS 1OR 2: CPT | Mod: 90 | Performed by: INTERNAL MEDICINE

## 2021-07-06 PROCEDURE — 81003 URINALYSIS AUTO W/O SCOPE: CPT | Mod: 59 | Performed by: INTERNAL MEDICINE

## 2021-07-08 LAB — PETH BLD-MCNC: NEGATIVE NG/ML

## 2021-07-12 ASSESSMENT — ANXIETY QUESTIONNAIRES
GAD7 TOTAL SCORE: 3
4. TROUBLE RELAXING: NOT AT ALL
2. NOT BEING ABLE TO STOP OR CONTROL WORRYING: SEVERAL DAYS
3. WORRYING TOO MUCH ABOUT DIFFERENT THINGS: SEVERAL DAYS
1. FEELING NERVOUS, ANXIOUS, OR ON EDGE: SEVERAL DAYS
5. BEING SO RESTLESS THAT IT IS HARD TO SIT STILL: NOT AT ALL
7. FEELING AFRAID AS IF SOMETHING AWFUL MIGHT HAPPEN: NOT AT ALL
6. BECOMING EASILY ANNOYED OR IRRITABLE: NOT AT ALL

## 2021-07-12 ASSESSMENT — PATIENT HEALTH QUESTIONNAIRE - PHQ9: SUM OF ALL RESPONSES TO PHQ QUESTIONS 1-9: 2

## 2021-07-13 ENCOUNTER — VIRTUAL VISIT (OUTPATIENT)
Dept: PSYCHOLOGY | Facility: CLINIC | Age: 41
End: 2021-07-13
Payer: COMMERCIAL

## 2021-07-13 DIAGNOSIS — F41.1 GAD (GENERALIZED ANXIETY DISORDER): ICD-10-CM

## 2021-07-13 DIAGNOSIS — F41.0 PANIC DISORDER WITHOUT AGORAPHOBIA: Primary | ICD-10-CM

## 2021-07-13 PROCEDURE — 90832 PSYTX W PT 30 MINUTES: CPT | Mod: 95 | Performed by: PSYCHOLOGIST

## 2021-07-13 ASSESSMENT — ANXIETY QUESTIONNAIRES
1. FEELING NERVOUS, ANXIOUS, OR ON EDGE: SEVERAL DAYS
7. FEELING AFRAID AS IF SOMETHING AWFUL MIGHT HAPPEN: NOT AT ALL
GAD7 TOTAL SCORE: 3
2. NOT BEING ABLE TO STOP OR CONTROL WORRYING: SEVERAL DAYS
GAD7 TOTAL SCORE: 3
7. FEELING AFRAID AS IF SOMETHING AWFUL MIGHT HAPPEN: NOT AT ALL
6. BECOMING EASILY ANNOYED OR IRRITABLE: NOT AT ALL
3. WORRYING TOO MUCH ABOUT DIFFERENT THINGS: SEVERAL DAYS
GAD7 TOTAL SCORE: 3
4. TROUBLE RELAXING: NOT AT ALL
5. BEING SO RESTLESS THAT IT IS HARD TO SIT STILL: NOT AT ALL

## 2021-07-13 ASSESSMENT — PATIENT HEALTH QUESTIONNAIRE - PHQ9
SUM OF ALL RESPONSES TO PHQ QUESTIONS 1-9: 2
SUM OF ALL RESPONSES TO PHQ QUESTIONS 1-9: 2
10. IF YOU CHECKED OFF ANY PROBLEMS, HOW DIFFICULT HAVE THESE PROBLEMS MADE IT FOR YOU TO DO YOUR WORK, TAKE CARE OF THINGS AT HOME, OR GET ALONG WITH OTHER PEOPLE: NOT DIFFICULT AT ALL

## 2021-07-13 NOTE — PROGRESS NOTES
Collaborative Care Psychiatry Service (CCPS)  July 13, 2021      Behavioral Health Clinician Progress Note    Patient Name: Cricket Chen      Telemedicine Visit: The patient's condition can be safely assessed and treated via synchronous audio and visual telemedicine encounter.      Reason for Telemedicine Visit: Services only offered telehealth    Originating Site (Patient Location): Patient's home    Distant Site (Provider Location): Provider Remote Setting- Home Office    Consent:  The patient/guardian has verbally consented to: the potential risks and benefits of telemedicine (video visit) versus in person care; bill my insurance or make self-payment for services provided; and responsibility for payment of non-covered services.     Mode of Communication:  Video Conference via [employer]    As the provider I attest to compliance with applicable laws and regulations related to telemedicine.         Service Type:  Individual      Service Location:   video/pt remote location/provider [facility] location-[place]      Session Start Time: 09:00am  Session End Time:  09:20am      Session Length: 16 - 37      Attendees: Patient    Visit Activities (Refresh list every visit): [facility] Only    Diagnostic Assessment Date: 10/15/2020  See Flowsheets for today's PHQ-9 and DEBBIE-7 results  Previous PHQ-9:   PHQ-9 SCORE 11/6/2020 2/23/2021 7/12/2021   PHQ-9 Total Score MyChart - - 2 (Minimal depression)   PHQ-9 Total Score 5 2 2       Previous DEBBIE-7:   DEBBIE-7 SCORE 11/6/2020 2/23/2021 7/12/2021   Total Score - - 3 (minimal anxiety)   Total Score 3 1 3       No flowsheet data found.     DATA  Extended Session (60+ minutes): No  Interactive Complexity: No  Crisis: No    Medication Compliance:  Yes      Chemical Use Review:   Substance Use: Chemical use reviewed, no active concerns identified      Tobacco Use: No current tobacco use.      Current Stressors / Issues:  Reported that things continue to go well for him. His mood is  stable and his sleep is better. He still has some heart palpitations but they are fewer (once a month and less intense). With regard to sleep, he uses trazadone to fall to sleep but not as often as in the past. He feels rested upon awakening. He still has not need to use the ativan as often (3-4x per month).       Changes in Health Issues:   None reported    Assessment: Current Emotional / Mental Status (status of significant symptoms):  Risk status (Self / Other harm or suicidal ideation)  Patient denies a history of suicidal ideation, suicide attempts, self-injurious behavior, homicidal ideation, homicidal behavior and and other safety concerns  Patient denies current fears or concerns for personal safety.  Patient denies current or recent suicidal ideation or behaviors.  Patient denies current or recent homicidal ideation or behaviors.  Patient denies current or recent self injurious behavior or ideation.  Patient denies other safety concerns.  A safety and risk management plan has not been developed at this time, however patient was encouraged to call [place] [phone] should there be a change in any of these risk factors.    Appearance:   Appropriate   Eye Contact:   Good   Psychomotor Behavior: Normal   Attitude:   Cooperative   Orientation:   All  Speech   Rate / Production: Normal    Volume:  Normal   Mood:    Normal  Affect:    Appropriate   Thought Content:  Clear   Thought Form:  Coherent  Logical   Insight:    Good     Diagnoses:  1. Panic disorder without agoraphobia    2. DEBBIE (generalized anxiety disorder)        Collateral Reports Completed:  Communicated with: Dr. Tyler    Plan: (Homework, other):  Patient was given information about behavioral services and encouraged to schedule a follow up appointment with [facility]  He was also given information about mental health symptoms and treatment options .  CD Recommendations: No indications of CD issues.            Dejan Rizo PsyD  July 13, 2021

## 2021-08-02 ENCOUNTER — TELEPHONE (OUTPATIENT)
Dept: CARDIOLOGY | Facility: CLINIC | Age: 41
End: 2021-08-02

## 2021-08-02 DIAGNOSIS — Q20.3 D-TGA (DEXTRO-TRANSPOSITION OF GREAT ARTERIES): Primary | ICD-10-CM

## 2021-08-04 NOTE — TELEPHONE ENCOUNTER
Cricket calling back to schedule but would like to know why he needs another ziopatch monitor.  Please call pt to discuss.

## 2021-08-06 NOTE — TELEPHONE ENCOUNTER
Call placed to patient to discuss wearing Zio monitor prior to visit with providers.  He notes that he never received results from his last monitor so is hesitant to wear one.  Apologized for lack of information provided to the patient.  He notes that he has been feeling well and hasn't had any concerns or palpitations.  Noted that the results of his Zio monitor will be reviewed at his next clinic visit, patient is agreeable to wear monitor prior to appointment.  Denies further questions or concerns at this time.    Gagandeep Corado RN  Cardiology RN Care Coordinator  [phone]

## 2021-08-16 ENCOUNTER — ALLIED HEALTH/NURSE VISIT (OUTPATIENT)
Dept: CARDIOLOGY | Facility: CLINIC | Age: 41
End: 2021-08-16
Attending: INTERNAL MEDICINE
Payer: COMMERCIAL

## 2021-08-16 DIAGNOSIS — Q20.3 D-TGA (DEXTRO-TRANSPOSITION OF GREAT ARTERIES): ICD-10-CM

## 2021-08-16 DIAGNOSIS — I48.92 ATRIAL FLUTTER, UNSPECIFIED TYPE (H): ICD-10-CM

## 2021-09-07 ENCOUNTER — LAB (OUTPATIENT)
Dept: LAB | Facility: CLINIC | Age: 41
End: 2021-09-07
Payer: COMMERCIAL

## 2021-09-07 DIAGNOSIS — Z94.4 LIVER REPLACED BY TRANSPLANT (H): ICD-10-CM

## 2021-09-07 DIAGNOSIS — Z13.220 LIPID SCREENING: ICD-10-CM

## 2021-09-07 LAB
ALBUMIN SERPL-MCNC: 4.4 G/DL (ref 3.4–5)
ALP SERPL-CCNC: 66 U/L (ref 40–150)
ALT SERPL W P-5'-P-CCNC: 26 U/L (ref 0–70)
ANION GAP SERPL CALCULATED.3IONS-SCNC: 4 MMOL/L (ref 3–14)
AST SERPL W P-5'-P-CCNC: 18 U/L (ref 0–45)
BILIRUB DIRECT SERPL-MCNC: 0.1 MG/DL (ref 0–0.2)
BILIRUB SERPL-MCNC: 0.5 MG/DL (ref 0.2–1.3)
BUN SERPL-MCNC: 19 MG/DL (ref 7–30)
CALCIUM SERPL-MCNC: 9.4 MG/DL (ref 8.5–10.1)
CHLORIDE BLD-SCNC: 108 MMOL/L (ref 94–109)
CO2 SERPL-SCNC: 27 MMOL/L (ref 20–32)
CREAT SERPL-MCNC: 1.31 MG/DL (ref 0.66–1.25)
ERYTHROCYTE [DISTWIDTH] IN BLOOD BY AUTOMATED COUNT: 12.8 % (ref 10–15)
GFR SERPL CREATININE-BSD FRML MDRD: 67 ML/MIN/1.73M2
GLUCOSE BLD-MCNC: 116 MG/DL (ref 70–99)
HCT VFR BLD AUTO: 47.9 % (ref 40–53)
HGB BLD-MCNC: 16.6 G/DL (ref 13.3–17.7)
MAGNESIUM SERPL-MCNC: 2.2 MG/DL (ref 1.6–2.3)
MCH RBC QN AUTO: 30.5 PG (ref 26.5–33)
MCHC RBC AUTO-ENTMCNC: 34.7 G/DL (ref 31.5–36.5)
MCV RBC AUTO: 88 FL (ref 78–100)
PLATELET # BLD AUTO: 143 10E3/UL (ref 150–450)
POTASSIUM BLD-SCNC: 4.4 MMOL/L (ref 3.4–5.3)
PROT SERPL-MCNC: 7.3 G/DL (ref 6.8–8.8)
RBC # BLD AUTO: 5.44 10E6/UL (ref 4.4–5.9)
SODIUM SERPL-SCNC: 139 MMOL/L (ref 133–144)
TACROLIMUS BLD-MCNC: 4.8 UG/L (ref 5–15)
TME LAST DOSE: ABNORMAL H
TME LAST DOSE: ABNORMAL H
WBC # BLD AUTO: 4.8 10E3/UL (ref 4–11)

## 2021-09-07 PROCEDURE — 82248 BILIRUBIN DIRECT: CPT

## 2021-09-07 PROCEDURE — 85027 COMPLETE CBC AUTOMATED: CPT

## 2021-09-07 PROCEDURE — 36415 COLL VENOUS BLD VENIPUNCTURE: CPT

## 2021-09-07 PROCEDURE — 80197 ASSAY OF TACROLIMUS: CPT

## 2021-09-07 PROCEDURE — 83735 ASSAY OF MAGNESIUM: CPT

## 2021-09-07 PROCEDURE — 80053 COMPREHEN METABOLIC PANEL: CPT

## 2021-09-12 ENCOUNTER — HEALTH MAINTENANCE LETTER (OUTPATIENT)
Age: 41
End: 2021-09-12

## 2021-09-21 DIAGNOSIS — I48.92 ATRIAL FLUTTER, UNSPECIFIED TYPE (H): ICD-10-CM

## 2021-09-22 RX ORDER — METOPROLOL SUCCINATE 25 MG/1
25 TABLET, EXTENDED RELEASE ORAL 2 TIMES DAILY
Qty: 180 TABLET | Refills: 0 | Status: SHIPPED | OUTPATIENT
Start: 2021-09-22 | End: 2021-12-22

## 2021-09-22 NOTE — TELEPHONE ENCOUNTER
METOPROLOL SUCCINATE ER 25MG TB24      Last Written Prescription Date:  10-2-2020  Last Fill Quantity: 180,   # refills: 3  Last Office Visit : 3-  RTC 1 year  Future Office visit:  11-    90 day spencer refill sent to pharmacy to cover to next appointment.

## 2021-11-02 ENCOUNTER — LAB (OUTPATIENT)
Dept: LAB | Facility: CLINIC | Age: 41
End: 2021-11-02
Payer: COMMERCIAL

## 2021-11-02 DIAGNOSIS — Q20.3 D-TGA (DEXTRO-TRANSPOSITION OF GREAT ARTERIES): ICD-10-CM

## 2021-11-02 DIAGNOSIS — Z94.4 LIVER REPLACED BY TRANSPLANT (H): ICD-10-CM

## 2021-11-02 DIAGNOSIS — Z13.220 LIPID SCREENING: ICD-10-CM

## 2021-11-02 DIAGNOSIS — I48.92 ATRIAL FLUTTER, UNSPECIFIED TYPE (H): ICD-10-CM

## 2021-11-02 LAB
ALBUMIN SERPL-MCNC: 4.1 G/DL (ref 3.4–5)
ALP SERPL-CCNC: 64 U/L (ref 40–150)
ALT SERPL W P-5'-P-CCNC: 25 U/L (ref 0–70)
ANION GAP SERPL CALCULATED.3IONS-SCNC: 4 MMOL/L (ref 3–14)
AST SERPL W P-5'-P-CCNC: 20 U/L (ref 0–45)
BASOPHILS # BLD AUTO: 0 10E3/UL (ref 0–0.2)
BASOPHILS NFR BLD AUTO: 0 %
BILIRUB DIRECT SERPL-MCNC: 0.2 MG/DL (ref 0–0.2)
BILIRUB SERPL-MCNC: 0.9 MG/DL (ref 0.2–1.3)
BUN SERPL-MCNC: 11 MG/DL (ref 7–30)
CALCIUM SERPL-MCNC: 9.2 MG/DL (ref 8.5–10.1)
CHLORIDE BLD-SCNC: 108 MMOL/L (ref 94–109)
CO2 SERPL-SCNC: 27 MMOL/L (ref 20–32)
CREAT SERPL-MCNC: 1.27 MG/DL (ref 0.66–1.25)
EOSINOPHIL # BLD AUTO: 0.1 10E3/UL (ref 0–0.7)
EOSINOPHIL NFR BLD AUTO: 1 %
ERYTHROCYTE [DISTWIDTH] IN BLOOD BY AUTOMATED COUNT: 13.4 % (ref 10–15)
GFR SERPL CREATININE-BSD FRML MDRD: 70 ML/MIN/1.73M2
GLUCOSE BLD-MCNC: 122 MG/DL (ref 70–99)
HCT VFR BLD AUTO: 46.6 % (ref 40–53)
HGB BLD-MCNC: 15.9 G/DL (ref 13.3–17.7)
LYMPHOCYTES # BLD AUTO: 1.2 10E3/UL (ref 0.8–5.3)
LYMPHOCYTES NFR BLD AUTO: 22 %
MAGNESIUM SERPL-MCNC: 1.9 MG/DL (ref 1.6–2.3)
MCH RBC QN AUTO: 30.7 PG (ref 26.5–33)
MCHC RBC AUTO-ENTMCNC: 34.1 G/DL (ref 31.5–36.5)
MCV RBC AUTO: 90 FL (ref 78–100)
MONOCYTES # BLD AUTO: 0.5 10E3/UL (ref 0–1.3)
MONOCYTES NFR BLD AUTO: 10 %
NEUTROPHILS # BLD AUTO: 3.6 10E3/UL (ref 1.6–8.3)
NEUTROPHILS NFR BLD AUTO: 67 %
PLATELET # BLD AUTO: 143 10E3/UL (ref 150–450)
POTASSIUM BLD-SCNC: 4.1 MMOL/L (ref 3.4–5.3)
PROT SERPL-MCNC: 7 G/DL (ref 6.8–8.8)
RBC # BLD AUTO: 5.18 10E6/UL (ref 4.4–5.9)
SODIUM SERPL-SCNC: 139 MMOL/L (ref 133–144)
TACROLIMUS BLD-MCNC: 4.3 UG/L (ref 5–15)
TME LAST DOSE: ABNORMAL H
TME LAST DOSE: ABNORMAL H
WBC # BLD AUTO: 5.3 10E3/UL (ref 4–11)

## 2021-11-02 PROCEDURE — 36415 COLL VENOUS BLD VENIPUNCTURE: CPT | Performed by: FAMILY MEDICINE

## 2021-11-02 PROCEDURE — 83735 ASSAY OF MAGNESIUM: CPT | Performed by: FAMILY MEDICINE

## 2021-11-02 PROCEDURE — 82248 BILIRUBIN DIRECT: CPT | Performed by: FAMILY MEDICINE

## 2021-11-02 PROCEDURE — 80053 COMPREHEN METABOLIC PANEL: CPT | Performed by: FAMILY MEDICINE

## 2021-11-02 PROCEDURE — 80197 ASSAY OF TACROLIMUS: CPT | Performed by: FAMILY MEDICINE

## 2021-11-02 PROCEDURE — 85025 COMPLETE CBC W/AUTO DIFF WBC: CPT | Performed by: FAMILY MEDICINE

## 2021-11-05 NOTE — TELEPHONE ENCOUNTER
ELIQUIS 5MG TABS      Last Written Prescription Date:  5-5-21  Last Fill Quantity: 180,   # refills: 1  Last Office Visit : 3-13-20  Future Office visit:  11-23-21    Platelet Count   Date Value Ref Range Status   11/02/2021 143 (L) 150 - 450 10e3/uL Final   07/06/2021 128 (L) 150 - 450 10e9/L Final       Routing refill request to provider for review/approval because:  Abnormal PLT

## 2021-11-11 DIAGNOSIS — I48.92 ATRIAL FLUTTER, UNSPECIFIED TYPE (H): ICD-10-CM

## 2021-11-11 NOTE — TELEPHONE ENCOUNTER
apixaban ANTICOAGULANT (ELIQUIS ANTICOAGULANT) 5 MG tablet  Last Written Prescription Date:  5/5/2021  Last Fill Quantity: 180,   # refills: 1  Last Office Visit : 3/13/2020  Future Office visit:   11/23/2021    Routing refill request to provider for review/approval because:  Pt out of med  Abnormal PLT   Refer to clinic for review    Recent Labs   Lab Test 11/02/21  1124   *       Netta Watts RN  Central Triage Red Flags/Med Refills

## 2021-11-11 NOTE — TELEPHONE ENCOUNTER
[facility] Call Center    Phone Message    May a detailed message be left on voicemail: yes     Reason for Call: Medication Refill Request    Has the patient contacted the pharmacy for the refill? Yes   Name of medication being requested: apixaban ANTICOAGULANT (ELIQUIS ANTICOAGULANT) 5 MG tablet    Provider who prescribed the medication: Dr. George  Pharmacy: [facility] [id number]    Date medication is needed: 11.11.21 - pt will take his last pill tonight.  Pt states the mail order pharmacy he uses says there are no refills on this medication.  Pt will need a refill sent to the [facility] since he is out after today's 11.11.21 dose.     Action Taken: Message routed to:  [facility] ([facility] Cardio    Travel Screening: Not Applicable

## 2021-11-17 NOTE — PATIENT INSTRUCTIONS
You were seen today in the [facility] Clinic at the [facility].    Cardiology Providers you saw during your visit:  Francesca Alba MD and Jaylen George MD    Diagnosis:  TGA    Results:  Francesca Alba MD and Jaylen George MD reviewed the results of your ECHO and EKG testing today in clinic.    Recommendations:    1. Continue to eat a heart healthy, low salt diet.  2. Continue to get 20-30 minutes of aerobic activity, 4-5 days per week.  Examples of aerobic activity include walking, running, swimming, cycling, etc.  3. Continue to observe good oral hygiene, with regular dental visits.  4. CHANGE lisinopril medication take 20mg once daily. In 2-3 weeks come back for a lab draw.       SBE prophylaxis:   Yes____  No__X__    Lifelong Bacterial Endocarditis Prophylaxis:  YES____  NO____    If YES is checked, follow the recommendations outlined below:  1. Take antibiotic(s) prior to recommended dental procedures and procedures on the respiratory tract or with infected skin, muscle or bones. SBE prophylaxis is not needed for routine GI and  procedures (ie. Colonoscopy or vaginal delivery)  2. Observe good oral hygiene daily, as advised by your dentist. Get regular professional dental care.  3. Keep cuts clean.  4. Infections should be treated promptly.  5. Symptoms of Infective Endocarditis could include: fever lasting more than 4-5 days or a recurrent fever that initially resolves but returns within 1-2 days)      Exercise restrictions:   Yes__X__  No____         If yes, list restrictions:  Must be allowed to rest if fatigued or SOB      Work restrictions:  Yes____  No_X___         If yes, list restrictions:    FASTING CHOLESTEROL was checked in the last 5 years YES_X__  NO___ (2021)  Continue to eat a heart healthy, low salt diet.         ____ Fasting lipid panel order today         ____ No changes in medications          ____ I recommend the following changes in your  cholesterol medications.:          ____ Please follow up for cholesterol screening at your primary care physician      Follow-up: Follow up with Dr. Alba and Dr. George in 1 year with an ECHO, labs and EKG prior to your appointment.     If you have questions or concerns please contact us at:    Analisa Cerna, MPH, RN, BSN  [facility] Pyle (Scheduling)  Nurse Care Coordinator     Clinic   Adult Congenital and CV Genetics   Adult Congenital and CV Genetic  [facility]   [facility]  (P) [phone]     (P) [phone]  [email or web address]  (F) [phone]        For after hours urgent needs, call [phone] and ask to speak to the Adult Congenital Physician on call.  Mention Job Code 0401.    For emergencies call 911.    [facility]  [facility]   [facility]  Mail Code [id number]  [address] [place]  [address]

## 2021-11-17 NOTE — PATIENT INSTRUCTIONS
You were seen today in the Adult Congenital and Cardiovascular Genetics Clinic at the [facility].    Cardiology Providers you saw during your visit:  Francesca Alba MD and Jaylen George MD    Diagnosis:  TGA    Results:  Francesca Alba MD and Jaylen George MD reviewed the results of your ECHO and EKG testing today in clinic.    Recommendations:    1. Continue to eat a heart healthy, low salt diet.  2. Continue to get 20-30 minutes of aerobic activity, 4-5 days per week.  Examples of aerobic activity include walking, running, swimming, cycling, etc.  3. Continue to observe good oral hygiene, with regular dental visits.  4. CHANGE lisinopril medication take 20mg once daily. In 2-3 weeks come back for a lab draw.       SBE prophylaxis:   Yes____  No__X__    Lifelong Bacterial Endocarditis Prophylaxis:  YES____  NO____    If YES is checked, follow the recommendations outlined below:  1. Take antibiotic(s) prior to recommended dental procedures and procedures on the respiratory tract or with infected skin, muscle or bones. SBE prophylaxis is not needed for routine GI and  procedures (ie. Colonoscopy or vaginal delivery)  2. Observe good oral hygiene daily, as advised by your dentist. Get regular professional dental care.  3. Keep cuts clean.  4. Infections should be treated promptly.  5. Symptoms of Infective Endocarditis could include: fever lasting more than 4-5 days or a recurrent fever that initially resolves but returns within 1-2 days)      Exercise restrictions:   Yes__X__  No____         If yes, list restrictions:  Must be allowed to rest if fatigued or SOB      Work restrictions:  Yes____  No_X___         If yes, list restrictions:    FASTING CHOLESTEROL was checked in the last 5 years YES_X__  NO___ (2021)  Continue to eat a heart healthy, low salt diet.         ____ Fasting lipid panel order today         ____ No changes in medications          ____ I recommend the following changes in your  cholesterol medications.:          ____ Please follow up for cholesterol screening at your primary care physician      Follow-up: Follow up with Dr. Alba and Dr. George in 1 year with an ECHO, labs and EKG prior to your appointment.     If you have questions or concerns please contact us at:    Analisa Cerna, MPH, RN, BSN  [facility] (Scheduling)  Nurse Care Coordinator     Clinic   Adult Congenital and CV Genetics   Adult Congenital and CV Genetic  [facility]   [facility]  (P) [phone]     (P) [phone]  [email or web address]  (F) [phone]        For after hours urgent needs, call [phone] and ask to speak to the Adult Congenital Physician on call.  Mention Job Code 0401.    For emergencies call 911.    [facility]  [facility]  Mail Code [id number]  [address] [place]  [address]

## 2021-11-23 ENCOUNTER — ANCILLARY PROCEDURE (OUTPATIENT)
Dept: CARDIOLOGY | Facility: CLINIC | Age: 41
End: 2021-11-23
Attending: INTERNAL MEDICINE
Payer: COMMERCIAL

## 2021-11-23 VITALS
SYSTOLIC BLOOD PRESSURE: 131 MMHG | HEIGHT: 70 IN | DIASTOLIC BLOOD PRESSURE: 83 MMHG | BODY MASS INDEX: 29.78 KG/M2 | HEART RATE: 70 BPM | WEIGHT: 208 LBS | OXYGEN SATURATION: 98 %

## 2021-11-23 VITALS
HEART RATE: 70 BPM | HEIGHT: 70 IN | DIASTOLIC BLOOD PRESSURE: 83 MMHG | OXYGEN SATURATION: 98 % | BODY MASS INDEX: 29.89 KG/M2 | WEIGHT: 208.8 LBS | SYSTOLIC BLOOD PRESSURE: 131 MMHG

## 2021-11-23 DIAGNOSIS — Z94.0 KIDNEY TRANSPLANTED: ICD-10-CM

## 2021-11-23 DIAGNOSIS — I48.92 ATRIAL FLUTTER, UNSPECIFIED TYPE (H): ICD-10-CM

## 2021-11-23 DIAGNOSIS — Q20.3 COMPLETE TRANSPOSITION OF GREAT VESSELS: ICD-10-CM

## 2021-11-23 DIAGNOSIS — Q20.3 D-TGA (DEXTRO-TRANSPOSITION OF GREAT ARTERIES): ICD-10-CM

## 2021-11-23 PROCEDURE — G0463 HOSPITAL OUTPT CLINIC VISIT: HCPCS

## 2021-11-23 PROCEDURE — 93005 ELECTROCARDIOGRAM TRACING: CPT

## 2021-11-23 PROCEDURE — 99214 OFFICE O/P EST MOD 30 MIN: CPT | Performed by: INTERNAL MEDICINE

## 2021-11-23 PROCEDURE — 93320 DOPPLER ECHO COMPLETE: CPT | Performed by: PEDIATRICS

## 2021-11-23 PROCEDURE — G0463 HOSPITAL OUTPT CLINIC VISIT: HCPCS | Mod: 27

## 2021-11-23 PROCEDURE — 93303 ECHO TRANSTHORACIC: CPT | Performed by: PEDIATRICS

## 2021-11-23 PROCEDURE — 93325 DOPPLER ECHO COLOR FLOW MAPG: CPT | Performed by: PEDIATRICS

## 2021-11-23 RX ORDER — LISINOPRIL 20 MG/1
20 TABLET ORAL DAILY
Qty: 90 TABLET | Refills: 3 | Status: SHIPPED | OUTPATIENT
Start: 2021-11-23 | End: 2023-01-04

## 2021-11-23 ASSESSMENT — MIFFLIN-ST. JEOR
SCORE: 1854.73
SCORE: 1858.36

## 2021-11-23 ASSESSMENT — PAIN SCALES - GENERAL
PAINLEVEL: NO PAIN (0)
PAINLEVEL: NO PAIN (0)

## 2021-11-23 NOTE — LETTER
2021      RE: Cricket Chen  [address]  [facility] [address]       Dear Colleague,    Thank you for the opportunity to participate in the care of your patient, Cricket Chen, at the [facility] [place] [facility]. Please see a copy of my visit note below.        [facility] ELECTROPHYSIOLOGY CLINIC VISIT    Assessment/Recommendations   Assessment/Plan:    Mr. Chen is a 41 year old male who has a past medical history significant for dTGA s/p modified Shoemaker operation and stitch closure of small VSD , AFL s/p DCCV and s/p ablation 2000, liver and kidney transplant , post transplant lymphoproliferative disorder, papillary thyroid cancer s/p thyroidectomy, prior ETOH abuse (sober since ), anxiety, and depression. He presents today for follow up.     Atrial Flutter:   We discussed in detail with the patient management/treatment options for AFL includin. Stroke Prophylaxis:  CHADSVASC=+HF, +HTN  2, corresponding to a 2.2% annual stroke / systemic emolism event rate. indicating need for long term oral anticoagulation. He also has congenital anatomy which we would recommend anticoagulation. He is appropriately on Eliquis. No bleeding isues.    2. Rate Control: Continue Metoprolol XL 25 mg daily.   3. Rhythm Control: Cardioversion, Antiarrhythmics and/or ablation are options for rhythm control. He has previously been on Sotalol. Not currently on AATs. S/p DCCV 3/7/18  with history of Ablation at OSH in . Organized A.flutter is highly amenable to ablation procedure. An ablation can be considered for any recurrence.   4. Risk Factor Management: maintain tight BP control, and JUAN evaluation.       dTGA systemic EF 27%, NYHA I-II:  1. ACEi/ARB: Continue Lisiopril.  2. BB: Continue Metoprolol XL 25 mg daily.   3. Aldosterone antagonist: not required.  4. SCD prophylaxis: discussed that systemic EF read 27%  recently, but relatively unchanged visually from prior imaging. We discussed that ICD can be considered, but there is limited data for congential patients.We are still deferring the ICD for now..   5. Fluid status: euvolemic on exam     Follow up in about 1 yearr.     Jaylen George MD [facility]  Cardiology - Electrophysiology       History of Present Illness/Subjective    Mr. Cricket Chen is a 41 year old male who comes in today for EP follow-up of [facility] dTGA and AFL.    Mr. Chen is a 41 year old male who has a past medical history significant for dTGA s/p modified Shoemaker operation and stitch closure of small VSD 1981, AFL s/p DCCV and s/p ablation 8/2000, liver and kidney transplant 2016, post transplant lymphoproliferative disorder, papillary thyroid cancer s/p thyroidectomy, prior ETOH abuse (sober since 2014), anxiety, and depression. He presents today for follow up.      He presented to [facility] on 3/6/18 with shortness of breath and chest flutterings that started the day prior. He recalled it felt similar to when he was in AFL in the past. In the [facility], he was found to be in AFL. He was subsequently admitted. He was started on Eliquis and arranged for NEELIMA/DCCV. He had successful DCCV 3/7/18 and he was discharged. He states that he had AFL in the past. He believes he had a DCCV around age 7. He had also been maintained on Sotalol. He also reports a previous ablation at an OSH in 8/2000 (records not available to us at this time). He did well without AFL issues until 3/2018 when he had recurrence. Most recent echo from 3/30/18 showed systemic EF 35-40%, mild TR, and no evidence of baffle leaks/obstructions. A CMRI from OSH from 3/2017 showed no baffle  Obstruction and systemic EF 33%. He saw [facility] EP clinic on 4/13/2018. He was doing well. He had not noted any further arrhythmias since his DCCV. He followed up in EP clinic in 7/2018 and was doing well without recurrent AFL.      EP Visit 3/2019: He presents  today for follow up. He presented to [facility] on 1/16/19 reporting some shortness of breath and palpitations. He was in sinus on presentation to ER. He was discharged on a zio patch monitor. Zio patch from 1/17/19-1/31/19 showed SR with rare ectopy and 7 NSVT up to 10 beats. 3 symptom activations showed sinus. An echo from 1/2019 showed moderately depressed Rv function, no obvious baffle obstructions or leaks. He reports feeling well and no recurrent palpitations. He denies any chest pain/pressures, dizziness, lightheadedness, worsening shortness of breath, leg/ankle swelling, PND, orthopnea, palpitations, or syncopal symptoms. Presenting 12 lead ECG shows SB Vent Rate 59 bpm,  ms,  ms, QTc 429 ms. Current cardiac medications include: Toprol XL, Eliquis, Lisinopril, and Norvasc.      EP Visit 3/13/20: He presents today for follow up. He reports feeling well. He denies any chest pain/pressures, dizziness, lightheadedness, worsening shortness of breath, leg/ankle swelling, PND, orthopnea, palpitations, or syncopal symptoms. Recent CMRI/MRA showed systemic EF 37%, normal baffles without obstruction, fatty metaplasia of sub-pulmonic ventricle and narrowing of intra-hepatic IVC which are unchanged. A zio patch from June 2019 showed SR with 7 NSVT episodes. Presenting 12 lead ECG shows NSR IRBBB Vent Rate 65 bpm,  ms,  ms, QTc 443 ms. Current cardiac medications include: Toprol XL, Eliquis, Lisinopril, and Norvasc.     He presents today for follow up. He reports feeling very well.  He is stable with no new symptoms, NYHA class II.  Echo today was reviewed and shows mod to sev systemic ventricle dysfunction but also considered no change from last echoA zio patch monitor from 8/16/21-8/30/21 showed 11 NSVT up to 10 beats and rare isolated ectopy with adequate HR distribution in sinus. Current cardiac medications include: Toprol XL, Eliquis, Lisinopril, and Norvasc.     I have reviewed and updated the  patient's Past Medical History, Social History, Family History and Medication List.     Cardiographics (Personally Reviewed) :   TTE 11/23/2021:  Patient after atrial switch operation for complete transposition of the great  arteries. Technically difficult study due to poor acoustic windows. Moderate  right ventricular dilatation and hypertrophy with moderate to severely  decreased systolic function (no significant change compared to last  echocardiogram). Mild tricuspid valve insufficiency. The pulmonary venous  baffle is unobstructed; the systmic venous baffle was not well seen.  Qualitatively, normal left ventricular systolic function. Mild pulmonary valve  insufficiency.    3/30/18 ECHOCARDIOGRAM:   Patient after atrial switch operation for complete transposition of the great  arteries. Technically difficult study due to poor acoustic windows. No baffle  obstruction or leaks seen. There is moderate right ventricular enlargement.  Single plane right ventricular EF 35-40 %. Normal left ventricular systolic  function. No outflow obstruction. Mild tricuspid regurgitation.     3/2017 CMRI (OSH REPORT):  1. Transposition of the great arteries with native atrioventricular   concordance and ventricular arterial discordance with the aorta anterior   to the pulmonary artery (D-transposition of the great arteries).  2. Status post interatrial baffle with the superior vena cava and inferior   vena cava baffled to the left (subpulmonic) ventricle without obstruction.    There is no evidence of a baffle leak.    3. Systemic right ventricle:  a. The right ventricle is hypertrophied.  b. Decreased systolic function with an ejection fraction of 33%.  c. Severe dilation of the right ventricle with an end-diastolic volume of   208 mL/m  (previously 188 mL/m ) and end-systolic volume of 140 mL/m    (previous 123 mL/m ).  4. The right ventricle connects with the anterior aorta.  The coronary   artery origins are usual for  transposition.  Left-sided aortic arch with   measurements above.  5. The pulmonary venous baffle to the right ventricle is widely patent.  6. The subpulmonary left ventricle has normal systolic function with   decreased myocardial mass.  The left ventricle connects to the pulmonary   artery, which is posterior to the aorta.  7. No significant change from previous cardiac MRI of 9/4/2012 except an   increased size in the indexed right ventricular diastolic and systolic   Volumes.  3/4/2020 CMRIMRA:  CONCLUSIONS: d-TGA with atrial baffle repair.  The systemic ventricle is moderately enlarged with severely  reduced function, EF 27%. The subpulmonic ventricle is small in size with normal function, EF 57%. Normal  baffles without any obstruction, thrombus or leakage. There is mild-moderate regurgitation of the systemic  atrioventricular (tricuspid) valve. There is no residual intracardiac shunting (Qp/Qs 1.0).   The subpulmonic ventricle exhibits extensive fatty metaplasia in the mid and apical segments of unclear  significance. Narrowing of intra-hepatic IVC of unclear significance.   Compared to the prior examination dated 03/09/2017 the systemic ventricle's function appears similar  visually. The fatty metaplasia of sub-pulmonic ventricle and narrowing of intra-hepatic IVC  is unchanged  from prior examinations.   1/2019 ECHO:  ------CONCLUSIONS------  Patient after atrial switch operation for complete transposition of the great  arteries. Technically difficult study due to poor acoustic windows. There is  moderate right ventricular enlargement. The right ventricular function is  qualitatively moderately depressed. Qualitivley the right ventricle function  is unchanged form the 3/30/2018 echo. Mild (1+) tricuspid valve insufficiency.  No obvious baffle obstruction or leaks seen. Qualitatively normal left  ventricular systolic function. Mild (1+) pulmonary valve insufficiency.  Limited visualization of the LPA  suggests narrowing. RPA not seen.       Physical Examination   There were no vitals taken for this visit.  Wt Readings from Last 3 Encounters:   03/11/21 96.8 kg (213 lb 6.4 oz)   10/07/20 88.2 kg (194 lb 8 oz)   08/18/20 97.5 kg (215 lb)     General Appearance:   Alert, well-appearing and in no acute distress.   HEENT: Atraumatic, normocephalic. PERRL.  MMM.   Chest/Lungs:   Respirations unlabored.  Lungs are clear to auscultation.   Cardiovascular:   Regular rate and rhythm.    Abdomen:  Soft, nontender, nondistended.   Extremities: No cyanosis or clubbing. No edema.    Musculoskeletal: Moves all extremities.  Gait normal.   Skin: Warm, dry, intact.    Neurologic: Mood and affect are appropriate.  Alert and oriented to person, place, time, and situation.          Medications  Allergies   Current Outpatient Medications   Medication Sig Dispense Refill     apixaban ANTICOAGULANT (ELIQUIS ANTICOAGULANT) 5 MG tablet Take 1 tablet (5 mg) by mouth 2 times daily 180 tablet 0     ketoconazole (NIZORAL) 2 % cream Twice daily to areas of rash on face 60 g 1     levothyroxine (SYNTHROID/LEVOTHROID) 137 MCG tablet Take 1 tablet (137 mcg) by mouth daily 90 tablet 3     lisinopril (ZESTRIL) 10 MG tablet TAKE ONE TABLET BY MOUTH ONCE DAILY 90 tablet 1     LORazepam (ATIVAN) 1 MG tablet Take 1 tablet (1 mg) by mouth daily as needed (severe anxiety/panic/sleep) 15 tablet 0     magnesium oxide (MAG-OX) 400 MG tablet TAKE ONE TABLET BY MOUTH TWICE A  tablet 11     metoprolol succinate ER (TOPROL-XL) 25 MG 24 hr tablet Take 1 tablet (25 mg) by mouth 2 times daily Next refill is at 11- appointment 180 tablet 0     mycophenolate (GENERIC EQUIVALENT) 250 MG capsule Take 2 capsules (500 mg) by mouth 2 times daily 120 capsule 11     sulfamethoxazole-trimethoprim (BACTRIM) 400-80 MG tablet Take 1 tablet by mouth daily 30 tablet 11     tacrolimus (GENERIC EQUIVALENT) 0.5 MG capsule TAKE ONE CAPSULE BY MOUTH EVERY MORNING  (TOTAL DOSE 1.5MG EVERY MORNING AND 1MG EVERY EVENING) 90 capsule 3     tacrolimus (GENERIC EQUIVALENT) 1 MG capsule TAKE ONE CAPSULE BY MOUTH EVERY 12 HOURS (TOTAL DOSE 1.5MG EVERY MORNING AND 1MG EVERY EVENING 180 capsule 3     traZODone (DESYREL) 50 MG tablet Take 1 tablet (50 mg) by mouth at bedtime as needed, may repeat once for sleep 180 tablet 0    Allergies   Allergen Reactions     Hydromorphone Itching         Lab Results (Personally Reviewed)    Chemistry/lipid CBC Cardiac Enzymes/BNP/TSH/INR   Lab Results   Component Value Date    BUN 11 11/02/2021     11/02/2021    CO2 27 11/02/2021     Creatinine   Date Value Ref Range Status   11/02/2021 1.27 (H) 0.66 - 1.25 mg/dL Final   07/06/2021 1.27 (H) 0.66 - 1.25 mg/dL Final       Lab Results   Component Value Date    CHOL 207 (H) 07/06/2021    HDL 38 (L) 07/06/2021     (H) 07/06/2021      Lab Results   Component Value Date    WBC 5.3 11/02/2021    HGB 15.9 11/02/2021    HCT 46.6 11/02/2021    MCV 90 11/02/2021     (L) 11/02/2021    Lab Results   Component Value Date    TSH 0.16 (L) 05/14/2021    INR 1.13 10/30/2019        The patient states understanding and is agreeable with the plan.   Jaylen George MD [facility]  Cardiology - Electrophysiology

## 2021-11-23 NOTE — PROGRESS NOTES
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  Pt reports that since last clinic visit he has been feeling well.  He denies any chest pain or pressure, sob/lucia, orthopnea, pnd, syncope/presyncope, suzette or change in exercise tolerance.  He also denies any recurrent palpitations since his ER visit last year.  He continues to remain active walking the dogs 3-4 times per week.  He denies any problems with his medications and reports compliance.         Past Medical History:   Diagnosis Date     Alcohol abuse     Last drink in Mid-April 2014     Anemia in ESRD (end-stage renal disease) (H)      Anxiety 2008     Atrial flutter (H) 2017     Cirrhosis (H)     S/P liver transplant     Depression      History of blood transfusion      History of transposition of great vessels     atrial switch at age 8 months old     Hypertension      Liver transplant recipient (H)     2016     Papillary thyroid carcinoma (H)      Pneumonia 11-15-14     Renal transplant recipient     2016     Varices, esophageal (H)        Past Surgical History:   Procedure Laterality Date     ANESTHESIA CARDIOVERSION N/A 3/7/2018    Procedure: ANESTHESIA CARDIOVERSION;  Cardioversion;  Surgeon: GENERIC ANESTHESIA PROVIDER;  Location:  OR     Formerly [facility] N/A 5/10/2016    Procedure: BENCH LIVER;  Surgeon: Ricky Deshpande MD;  Location:  OR     BIOPSY LYMPH NODE CERVICAL Right 1/26/2017    Procedure: BIOPSY LYMPH NODE CERVICAL;  Surgeon: Beka Soto MD;  Location:  OR     CARDIAC SURGERY  9-10-80     CREATE FISTULA ARTERIOVENOUS UPPER EXTREMITY Right 9/16/2014    Procedure: CREATE  FISTULA ARTERIOVENOUS UPPER EXTREMITY;  Surgeon: Padmaja Eaton MD;  Location: [facility] OR     CV RIGHT HEART CATH MEASUREMENTS RECORDED N/A 4/19/2019    Procedure: CV RIGHT HEART CATH;  Surgeon: Sabi Wiggins MD;  Location:  HEART CARDIAC CATH LAB     CYSTOSCOPY, REMOVE STENT(S), COMBINED Right 6/22/2016    Procedure: COMBINED CYSTOSCOPY, REMOVE STENT(S);  Surgeon: Ricky Deshpande MD;  Location: [facility] OR     EMBOLECTOMY UPPER EXTREMITY Right 8/17/2018    Procedure: EMBOLECTOMY UPPER EXTREMITY;  Repair Right Upper Arm Pseudo Aneursym ;  Surgeon: John Payton MD;  Location: [facility] OR     ENT SURGERY       ESOPHAGOSCOPY, GASTROSCOPY, DUODENOSCOPY (EGD), COMBINED  5/30/2014    Procedure: COMBINED ESOPHAGOSCOPY, GASTROSCOPY, DUODENOSCOPY (EGD);  Surgeon: Guillaume Bautista MD;  Location:  GI     ESOPHAGOSCOPY, GASTROSCOPY, DUODENOSCOPY (EGD), COMBINED  9/30/14     ESOPHAGOSCOPY, GASTROSCOPY, DUODENOSCOPY (EGD), COMBINED Left 3/12/2015    Procedure: COMBINED ESOPHAGOSCOPY, GASTROSCOPY, DUODENOSCOPY (EGD);  Surgeon: Laureen Galvan MD;  Location:  GI     ESOPHAGOSCOPY, GASTROSCOPY, DUODENOSCOPY (EGD), COMBINED N/A 4/21/2016    Procedure: COMBINED ESOPHAGOSCOPY, GASTROSCOPY, DUODENOSCOPY (EGD);  Surgeon: Laureen Galvan MD;  Location:  GI     ESOPHAGOSCOPY, GASTROSCOPY, DUODENOSCOPY (EGD), COMBINED N/A 7/21/2016    Procedure: COMBINED ESOPHAGOSCOPY, GASTROSCOPY, DUODENOSCOPY (EGD);  Surgeon: Laureen Galvan MD;  Location:  GI     [facility] OR CATH ABLATION NON-CARDIAC ENDOVASCULAR  1990,2002    SVT     INSERT PORT VASCULAR ACCESS N/A 4/3/2017    Procedure: INSERT PORT VASCULAR ACCESS;  Surgeon: Rajendra Jacques PA-C;  [facility]: [facility] OR     IR PORT REMOVAL LEFT  1/22/2019     LIGATE FISTULA ARTERIOVENOUS UPPER EXTREMITY Right 11/7/2017    Procedure: LIGATE FISTULA ARTERIOVENOUS UPPER EXTREMITY;  Revision of Right Arm Arteriovenous Fistula ;  Surgeon: Padmaja Eaton  MD;  Location: [facility] OR     PERCUTANEOUS BIOPSY KIDNEY Right 2018    Procedure: PERCUTANEOUS BIOPSY KIDNEY;  Right Kidney Biopsy;  Surgeon: Yuval Khan MD;  [facility]     PERCUTANEOUS BIOPSY KIDNEY Right 10/30/2019    Procedure: Right Side Kidney Biopsy;  Surgeon: Jake Sidhu MD;  Location: [facility] OR     PICC INSERTION  14    PICC line placement 14; Removal 2014     REMOVE PORT VASCULAR ACCESS Right 2019    Procedure: Right chest Port Removal;  Surgeon: Erasmo Ziegler PA-C;  Location: [facility] OR     THORACIC SURGERY  1980    Transposition great arteries, repaired at 8 months     THYROIDECTOMY Right 2017    Procedure: THYROIDECTOMY;  Bilateral Total Thyroidectomy ;  Surgeon: Beka Soto MD;  [facility]: [facility] OR     TRANSPLANT KIDNEY RECIPIENT  DONOR  5/10/2016    Procedure: TRANSPLANT KIDNEY RECIPIENT  DONOR;  Surgeon: Ricky Deshpande MD;  [facility]     TRANSPLANT LIVER RECIPIENT  DONOR N/A 5/10/2016    Procedure: TRANSPLANT LIVER RECIPIENT  DONOR;  Surgeon: Ricky Deshpande MD;  Location: [facility] OR   As above, including HPI    Family History   Problem Relation Age of Onset     No Known Problems Brother      Arthritis Father      Hypertension Father      Hypertension Mother      Anxiety Disorder Mother      Hyperlipidemia Mother      Hypertension Sister      Anxiety Disorder Sister      No Known Problems Maternal Grandmother      No Known Problems Maternal Grandfather      No Known Problems Paternal Grandmother      No Known Problems Brother      No Known Problems Paternal Grandfather      Alcohol/Drug No family hx of      Gastrointestinal Disease No family hx of         no fam hx of liver disease or liver cancer       Meds  apixaban ANTICOAGULANT (ELIQUIS ANTICOAGULANT) 5 MG tablet, Take 1 tablet (5 mg) by mouth 2 times daily  ketoconazole (NIZORAL) 2 % cream, Twice daily to areas of rash on face  levothyroxine  "(SYNTHROID/LEVOTHROID) 137 MCG tablet, Take 1 tablet (137 mcg) by mouth daily  lisinopril (ZESTRIL) 10 MG tablet, TAKE ONE TABLET BY MOUTH ONCE DAILY  LORazepam (ATIVAN) 1 MG tablet, Take 1 tablet (1 mg) by mouth daily as needed (severe anxiety/panic/sleep)  magnesium oxide (MAG-OX) 400 MG tablet, TAKE ONE TABLET BY MOUTH TWICE A DAY  metoprolol succinate ER (TOPROL-XL) 25 MG 24 hr tablet, Take 1 tablet (25 mg) by mouth 2 times daily Next refill is at 11- appointment  mycophenolate (GENERIC EQUIVALENT) 250 MG capsule, Take 2 capsules (500 mg) by mouth 2 times daily  sulfamethoxazole-trimethoprim (BACTRIM) 400-80 MG tablet, Take 1 tablet by mouth daily  tacrolimus (GENERIC EQUIVALENT) 0.5 MG capsule, TAKE ONE CAPSULE BY MOUTH EVERY MORNING (TOTAL DOSE 1.5MG EVERY MORNING AND 1MG EVERY EVENING)  tacrolimus (GENERIC EQUIVALENT) 1 MG capsule, TAKE ONE CAPSULE BY MOUTH EVERY 12 HOURS (TOTAL DOSE 1.5MG EVERY MORNING AND 1MG EVERY EVENING  traZODone (DESYREL) 50 MG tablet, Take 1 tablet (50 mg) by mouth at bedtime as needed, may repeat once for sleep    No current facility-administered medications on file prior to visit.      Allergies   Allergen Reactions     Hydromorphone Itching       /83 (BP Location: Right arm, Patient Position: Supine, Cuff Size: Adult Regular)   Pulse 70   Ht 1.778 m (5' 10\")   Wt 94.7 kg (208 lb 12.8 oz)   SpO2 98%   BMI 29.96 kg/m    General: appears comfortable, alert and articulate  Head: normocephalic, atraumatic  Eyes: anicteric sclera, EOMI  Neck: no adenopathy or masses, 2+ carotids without bruits  Orophyarynx: moist mucosa, no lesions, dentition intact  Heart: regular, normal S1, loud S2, no murmur, gallop, or rub, estimated JVP <10 cm  Lungs: clear, no rales or wheezing  Abdomen: soft, non-tender, bowel sounds present, no hepatomegaly  Extremities: no clubbing, cyanosis or edema  Neurological: normal speech and affect, no gross motor deficits    Labs:   Ref. Range " 11/2/2021 11:23   Sodium Latest Ref Range: 133 - 144 mmol/L 139   Potassium Latest Ref Range: 3.4 - 5.3 mmol/L 4.1   Chloride Latest Ref Range: 94 - 109 mmol/L 108   Carbon Dioxide Latest Ref Range: 20 - 32 mmol/L 27   Urea Nitrogen Latest Ref Range: 7 - 30 mg/dL 11   Creatinine Latest Ref Range: 0.66 - 1.25 mg/dL 1.27 (H)   GFR Estimate Latest Ref Range: >60 mL/min/1.73m2 70   Calcium Latest Ref Range: 8.5 - 10.1 mg/dL 9.2   Anion Gap Latest Ref Range: 3 - 14 mmol/L 4   Magnesium Latest Ref Range: 1.6 - 2.3 mg/dL 1.9   Albumin Latest Ref Range: 3.4 - 5.0 g/dL 4.1   Protein Total Latest Ref Range: 6.8 - 8.8 g/dL 7.0   Bilirubin Total Latest Ref Range: 0.2 - 1.3 mg/dL 0.9   Alkaline Phosphatase Latest Ref Range: 40 - 150 U/L 64   ALT Latest Ref Range: 0 - 70 U/L 25   AST Latest Ref Range: 0 - 45 U/L 20   Bilirubin Direct Latest Ref Range: 0.0 - 0.2 mg/dL 0.2   Glucose Latest Ref Range: 70 - 99 mg/dL 122 (H)       Cardiac MRI 3/9/2017  Great arteries with native atrioventricular   concordance and ventricular arterial discordance with the aorta anterior   to the pulmonary artery (D-transposition of the great arteries).  2. Status post interatrial baffle with the superior vena cava and inferior   vena cava baffled to the left (subpulmonic) ventricle without obstruction.    There is no evidence of a baffle leak.    3. Systemic right ventricle:  a. The right ventricle is hypertrophied.  b. Decreased systolic function with an ejection fraction of 33%.  c. Severe dilation of the right ventricle with an end-diastolic volume of   208 mL/m  (previously 188 mL/m ) and end-systolic volume of 140 mL/m    (previous 123 mL/m ).  4. The right ventricle connects with the anterior aorta.  The coronary   artery origins are usual for transposition.  Left-sided aortic arch with   measurements above.  5. The pulmonary venous baffle to the right ventricle is widely patent.  6. The subpulmonary left ventricle has normal systolic function  with   decreased myocardial mass.  The left ventricle connects to the pulmonary   artery, which is posterior to the aorta.  7. No significant change from previous cardiac MRI of 9/4/2012 except an   increased size in the indexed right ventricular diastolic and systolic   Volumes    Echo (3/30/18):   Patient after atrial switch operation for complete transposition of the great arteries. Technically difficult study due to poor acoustic windows. No baffle obstruction or leaks seen. There is moderate right ventricular enlargement. Single plane right ventricular EF 35-40 %. Normal left ventricular systolic function. No outflow obstruction. Mild tricuspid regurgitation.    EKG (3/30:18): Sinus rhythm, RAD, RBBB, prominent RV forces with repolarization abnormality.      Echo 1/17/19  Patient after atrial switch operation for complete transposition of the great  arteries. Technically difficult study due to poor acoustic windows. There is  moderate right ventricular enlargement. The right ventricular function is  qualitatively moderately depressed. Qualitivley the right ventricle function  is unchanged form the 3/30/2018 echo. Mild (1+) tricuspid valve insufficiency.  No obvious baffle obstruction or leaks seen. Qualitatively normal left  ventricular systolic function. Mild (1+) pulmonary valve insufficiency.  Limited visualization of the LPA suggests narrowing. RPA not seen.    Mercedes Feb 2019      CMR Report 3-   SUMMARY  Clinical history:39-year old male with a history of d-TGA (with baffle repair), small VSD (closed with a  stitch) and combined liver/renal transplant. He is known to have depressed systemic ventricle function. CMR  to evaluate RV function.   Comparison CMR: 03/09/2017 ([facility])  SITUS: There is normal situs The liver is in the right upper quadrant and a single spleen in the left  upper quadrant.   CAVAE: There is a single SVC and a single IVC.both drain unobstructed into the  sub-pulmonic ventricle via  baffle.  SVC baffle measures 1.6 x 1.0 cm. IVC baffle measures 1.5 x 1.4 cm. The intrahepatic segment of  the native IVC measures 13.9 x 5.3 mm at its narrowest point. There does not appear to be any baffle  obstruction or leakage.   PULMONARY VEINS: There are four pulmonary veins with two left-sided veins and two right-sided veins.They  drain in an unobstructed fashion via baffle to the systemic ventricle.   ATRIOVENTRICULAR CONNECTION: The atrioventricular connection is discordant. There is mild-moderate  regurgitation of the systemic atrioventricular (tricuspid) valve. There is mild FARHANA of the sub-pulmonic AV  valve (mitral) without septal contact or obstruction.   VENTRICLES: There is levocardia. The systemic (morphologic right) ventricle is moderately enlarged with  severe hypertrophy. The global systolic function of the systemic ventricle is severely reduced. The  systemic ventricle's EF is 27%. The subpulmonic (morphologic left) ventricle is smallï cavity size. The  subpulmonic ventricle exhibits wall thinning and a pattern of fatty metaplasia with enhancement on LGE  imaging in the mid-anterolateral, mid anteroseptal, apical septal, and apical lateral segments. The global  systolic function of the subpulmonic ventricle is normal. The subpulmonic ventricle's EF is 57%. There are  no regional wall motion abnormalities. The VSD repair is visualized in the basal septum and is intact.  There is no evidence of intracardiac shunting (Qp/Qs 1.0).  VENTRICULOARTERIAL CONNECTION: The ventriculoarterial connection is discordant.There is no significant  valvular disease.   AORTA AND SUPRA-AORTIC VESSELS: The aortic arch is left-sided with normal branching of the vessels.   PULMONARY ARTERY: The main pulmonary artery is mildly enlarged. The branch pulmonary arteries are normal in  size.   CONCLUSIONS: d-TGA with atrial baffle repair.  The systemic ventricle is moderately enlarged with  severely  reduced function, EF 27%. The subpulmonic ventricle is small in size with normal function, EF 57%. Normal  baffles without any obstruction, thrombus or leakage. There is mild-moderate regurgitation of the systemic  atrioventricular (tricuspid) valve. There is no residual intracardiac shunting (Qp/Qs 1.0).   The subpulmonic ventricle exhibits extensive fatty metaplasia in the mid and apical segments of unclear  significance. Narrowing of intra-hepatic IVC of unclear significance.   Compared to the prior examination dated 03/09/2017 the systemic ventricle's function appears similar  visually. The fatty metaplasia of sub-pulmonic ventricle and narrowing of intra-hepatic IVC  is unchanged  from prior examinations.     Zio Sept 2021      Echo 11/23/21  Patient after atrial switch operation for complete transposition of the great  arteries. Technically difficult study due to poor acoustic windows. Moderate  right ventricular dilatation and hypertrophy with moderate to severely  decreased systolic function (no significant change compared to last  echocardiogram). Mild tricuspid valve insufficiency. The pulmonary venous  baffle is unobstructed; the systmic venous baffle was not well seen.  Qualitatively, normal left ventricular systolic function. Mild pulmonary valve  Insufficiency.      Assessment and Plan:  40 yo M with history of d - transposition of the great vessels s/p baffle repair in 1981 with small VSD stitch closure, atrial flutter s/p ablation and recent cardioversion, with stably depressed systemic RV function, s/p liver and kidney transplant for EtOH abuse, post-transplant lymphoproliferative disorder, papillary thyroid cancer s/p thryoidectomy presents for ongoing evaluation and management.    1.  TGA s/p atrial switch now with stably depressed systemic ventricular function:  Although ventricular function is depressed this has been stable for the past several years as confirmed by recent echo.  In  regard to heart failure medical regimen, his systemic ventricle is an anatomic right ventricle and there is no proven therapies to improve morbidity or mortality in right ventricular failure and especially systemic right ventricular failure.  However, given that his BP is elevated above goal will increase lisinopril from 10mg daily to 20mg daily.  Repeat labs in 2-3 weeks.  Will continue current dose of metoprolol xl.  Encouraged patient to begin regular aerobic exercise aiming for at least 150 minutes of moderate physical activity or 75 minutes of vigorous physical activity - or an equal combination of both - each week. and follow low-salt, heart healthy diet.    2.  H/o atrial flutter s/p ablation: Recent Zio monitor with no recurreance.  Continue apixaban and metoprolol xl.  Seen by congenital EP today.  Please see their note for detailed findings and plan.    Follow-up:  Labs in 2-3 weeks.  RTC in one year with labs, echo ad EKG.  Will be happy to see sooner if change in clinical status or new questions/concerns arise.      Francesca Alba MD  Section Head - Advanced Heart Failure, Transplantation and Mechanical Circulatory Support  Director - [facility]  Associate Professor of Medicine, [facility]    I spent a total of 30 minutes today with the patient personally reviewing recent cardiac testing and/or laboratory results, today's history and examination, and discussion and counseling with the patient.      Answers for HPI/ROS submitted by the patient on 11/22/2021  General Symptoms: No  Skin Symptoms: No  HENT Symptoms: No  EYE SYMPTOMS: No  HEART SYMPTOMS: No  LUNG SYMPTOMS: No  INTESTINAL SYMPTOMS: No  URINARY SYMPTOMS: No  REPRODUCTIVE SYMPTOMS: No  SKELETAL SYMPTOMS: No  BLOOD SYMPTOMS: No  NERVOUS SYSTEM SYMPTOMS: No  MENTAL HEALTH SYMPTOMS: No

## 2021-11-23 NOTE — NURSING NOTE
Cardiac Testing: Patient given instructions regarding  echocardiogram . Discussed purpose, preparation, procedure and when to expect results reported back to the patient. Patient demonstrated understanding of this information and agreed to call with further questions or concerns.    Diet: Patient instructed regarding a heart healthy diet, including discussion of reduced fat and sodium intake. Patient demonstrated understanding of this information and agreed to call with further questions or concerns.    Med Reconcile: Reviewed and verified all current medications with the patient. The updated medication list was printed and given to the patient.    Return Appointment: Patient given instructions regarding scheduling next clinic visit. Patient demonstrated understanding of this information and agreed to call with further questions or concerns.    Medication Change: Patient was educated regarding prescribed medication change, including discussion of the indication, administration, side effects, and when to report to MD or RN. Patient demonstrated understanding of this information and agreed to call with further questions or concerns. Complete a BMP in 2 weeks after lisinopril change (from 10mg to 20mg).    Patient stated he understood all health information given and agreed to call with further questions or concerns.  Analisa Cerna RN on 11/23/2021 at 10:07 AM

## 2021-11-23 NOTE — LETTER
11/23/2021      RE: Cricket ISAAC April  [address] [facility] [address]       Dear Colleague,    Thank you for the opportunity to participate in the care of your patient, Cricket Chen, at the [facility] [place] [facility]. Please see a copy of my visit note below.    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  Pt reports that since last clinic visit he has been feeling well.  He denies any chest pain or pressure, sob/lucia, orthopnea, pnd, syncope/presyncope, suzette or change in exercise tolerance.  He also denies any recurrent palpitations since his ER visit last year.  He continues to remain active walking the dogs 3-4 times per week.  He denies any problems with his medications and reports compliance.         Past Medical History:   Diagnosis Date     Alcohol abuse     Last drink in Mid-April 2014     Anemia in ESRD (end-stage renal disease) (H)      Anxiety 2008     Atrial flutter (H) 2017     Cirrhosis (H)     S/P liver transplant     Depression      History of blood transfusion      History of transposition of great vessels     atrial switch at age 8 months old     Hypertension      Liver transplant recipient (H)     2016     Papillary thyroid carcinoma (H)      Pneumonia 11-15-14     Renal transplant recipient     2016     Varices, esophageal (H)        Past Surgical History:   Procedure Laterality Date     ANESTHESIA CARDIOVERSION N/A 3/7/2018    Procedure: ANESTHESIA CARDIOVERSION;  Cardioversion;  Surgeon: GENERIC ANESTHESIA PROVIDER;  Location:  OR      BENCH LIVER N/A 5/10/2016    Procedure: BENCH LIVER;  Surgeon: Ricky Deshpande MD;  Location: [facility] OR     BIOPSY LYMPH NODE CERVICAL Right 1/26/2017    Procedure: BIOPSY LYMPH NODE CERVICAL;  Surgeon: Beka Soto MD;  Location: [facility] OR     CARDIAC SURGERY  9-10-80     CREATE FISTULA ARTERIOVENOUS UPPER EXTREMITY Right 9/16/2014    Procedure: CREATE FISTULA ARTERIOVENOUS UPPER EXTREMITY;  Surgeon: Padmaja Eaton MD;  Location: [facility] OR     CV RIGHT HEART CATH MEASUREMENTS RECORDED N/A 4/19/2019    Procedure: CV RIGHT HEART CATH;  Surgeon: Sabi Wiggins MD;  Location:  HEART CARDIAC CATH LAB     CYSTOSCOPY, REMOVE STENT(S), COMBINED Right 6/22/2016    Procedure: COMBINED CYSTOSCOPY, REMOVE STENT(S);  Surgeon: Ricky Deshpande MD;  Location: [facility] OR     EMBOLECTOMY UPPER EXTREMITY Right 8/17/2018    Procedure: EMBOLECTOMY UPPER EXTREMITY;  Repair Right Upper Arm Pseudo Aneursym ;  Surgeon: John Payton MD;  Location: [facility] OR     ENT SURGERY       ESOPHAGOSCOPY, GASTROSCOPY, DUODENOSCOPY (EGD), COMBINED  5/30/2014    Procedure: COMBINED ESOPHAGOSCOPY, GASTROSCOPY, DUODENOSCOPY (EGD);  Surgeon: Guillaume Bautista MD;  Location:  GI     ESOPHAGOSCOPY, GASTROSCOPY, DUODENOSCOPY (EGD), COMBINED  9/30/14     ESOPHAGOSCOPY, GASTROSCOPY, DUODENOSCOPY (EGD), COMBINED Left 3/12/2015    Procedure: COMBINED ESOPHAGOSCOPY, GASTROSCOPY, DUODENOSCOPY (EGD);  Surgeon: Laureen Galvan MD;  Location:  GI     ESOPHAGOSCOPY, GASTROSCOPY, DUODENOSCOPY (EGD), COMBINED N/A 4/21/2016    Procedure: COMBINED ESOPHAGOSCOPY, GASTROSCOPY, DUODENOSCOPY (EGD);  Surgeon: Laureen Galvan MD;  Location:  GI     ESOPHAGOSCOPY, GASTROSCOPY, DUODENOSCOPY (EGD), COMBINED N/A 7/21/2016    Procedure: COMBINED ESOPHAGOSCOPY, GASTROSCOPY, DUODENOSCOPY (EGD);  Surgeon: Laureen Galvan MD;  Location:  GI     [facility] OR CATH ABLATION NON-CARDIAC ENDOVASCULAR  1990,2002    SVT     INSERT PORT  VASCULAR ACCESS N/A 4/3/2017    Procedure: INSERT PORT VASCULAR ACCESS;  Surgeon: Rajendra Jacques PA-C;  [facility]: [facility] OR     IR PORT REMOVAL LEFT  2019     LIGATE FISTULA ARTERIOVENOUS UPPER EXTREMITY Right 2017    Procedure: LIGATE FISTULA ARTERIOVENOUS UPPER EXTREMITY;  Revision of Right Arm Arteriovenous Fistula ;  Surgeon: Padmaja Eaton MD;  [facility]: [facility] OR     PERCUTANEOUS BIOPSY KIDNEY Right 2018    Procedure: PERCUTANEOUS BIOPSY KIDNEY;  Right Kidney Biopsy;  Surgeon: Yuval Khan MD;  [facility]     PERCUTANEOUS BIOPSY KIDNEY Right 10/30/2019    Procedure: Right Side Kidney Biopsy;  Surgeon: Jake Sidhu MD;  [facility]     PICC INSERTION  14    PICC line placement 14; Removal 2014     REMOVE PORT VASCULAR ACCESS Right 2019    Procedure: Right chest Port Removal;  Surgeon: Erasmo Ziegler PA-C;  [facility]     THORACIC SURGERY  1980    Transposition great arteries, repaired at 8 months     THYROIDECTOMY Right 2017    Procedure: THYROIDECTOMY;  Bilateral Total Thyroidectomy ;  Surgeon: Beka Soto MD;  [facility]: [facility] OR     TRANSPLANT KIDNEY RECIPIENT  DONOR  5/10/2016    Procedure: TRANSPLANT KIDNEY RECIPIENT  DONOR;  Surgeon: Ricky Deshpande MD;  [facility]: [facility] OR     TRANSPLANT LIVER RECIPIENT  DONOR N/A 5/10/2016    Procedure: TRANSPLANT LIVER RECIPIENT  DONOR;  Surgeon: Ricky Deshpande MD;  Location: [facility] OR   As above, including HPI    Family History   Problem Relation Age of Onset     No Known Problems Brother      Arthritis Father      Hypertension Father      Hypertension Mother      Anxiety Disorder Mother      Hyperlipidemia Mother      Hypertension Sister      Anxiety Disorder Sister      No Known Problems Maternal Grandmother      No Known Problems Maternal Grandfather      No Known Problems Paternal Grandmother      No Known Problems Brother      No Known Problems Paternal  "Grandfather      Alcohol/Drug No family hx of      Gastrointestinal Disease No family hx of         no fam hx of liver disease or liver cancer       Meds  apixaban ANTICOAGULANT (ELIQUIS ANTICOAGULANT) 5 MG tablet, Take 1 tablet (5 mg) by mouth 2 times daily  ketoconazole (NIZORAL) 2 % cream, Twice daily to areas of rash on face  levothyroxine (SYNTHROID/LEVOTHROID) 137 MCG tablet, Take 1 tablet (137 mcg) by mouth daily  lisinopril (ZESTRIL) 10 MG tablet, TAKE ONE TABLET BY MOUTH ONCE DAILY  LORazepam (ATIVAN) 1 MG tablet, Take 1 tablet (1 mg) by mouth daily as needed (severe anxiety/panic/sleep)  magnesium oxide (MAG-OX) 400 MG tablet, TAKE ONE TABLET BY MOUTH TWICE A DAY  metoprolol succinate ER (TOPROL-XL) 25 MG 24 hr tablet, Take 1 tablet (25 mg) by mouth 2 times daily Next refill is at 11- appointment  mycophenolate (GENERIC EQUIVALENT) 250 MG capsule, Take 2 capsules (500 mg) by mouth 2 times daily  sulfamethoxazole-trimethoprim (BACTRIM) 400-80 MG tablet, Take 1 tablet by mouth daily  tacrolimus (GENERIC EQUIVALENT) 0.5 MG capsule, TAKE ONE CAPSULE BY MOUTH EVERY MORNING (TOTAL DOSE 1.5MG EVERY MORNING AND 1MG EVERY EVENING)  tacrolimus (GENERIC EQUIVALENT) 1 MG capsule, TAKE ONE CAPSULE BY MOUTH EVERY 12 HOURS (TOTAL DOSE 1.5MG EVERY MORNING AND 1MG EVERY EVENING  traZODone (DESYREL) 50 MG tablet, Take 1 tablet (50 mg) by mouth at bedtime as needed, may repeat once for sleep    No current facility-administered medications on file prior to visit.      Allergies   Allergen Reactions     Hydromorphone Itching       /83 (BP Location: Right arm, Patient Position: Supine, Cuff Size: Adult Regular)   Pulse 70   Ht 1.778 m (5' 10\")   Wt 94.7 kg (208 lb 12.8 oz)   SpO2 98%   BMI 29.96 kg/m    General: appears comfortable, alert and articulate  Head: normocephalic, atraumatic  Eyes: anicteric sclera, EOMI  Neck: no adenopathy or masses, 2+ carotids without bruits  Orophyarynx: moist mucosa, no " lesions, dentition intact  Heart: regular, normal S1, loud S2, no murmur, gallop, or rub, estimated JVP <10 cm  Lungs: clear, no rales or wheezing  Abdomen: soft, non-tender, bowel sounds present, no hepatomegaly  Extremities: no clubbing, cyanosis or edema  Neurological: normal speech and affect, no gross motor deficits    Labs:   Ref. Range 11/2/2021 11:23   Sodium Latest Ref Range: 133 - 144 mmol/L 139   Potassium Latest Ref Range: 3.4 - 5.3 mmol/L 4.1   Chloride Latest Ref Range: 94 - 109 mmol/L 108   Carbon Dioxide Latest Ref Range: 20 - 32 mmol/L 27   Urea Nitrogen Latest Ref Range: 7 - 30 mg/dL 11   Creatinine Latest Ref Range: 0.66 - 1.25 mg/dL 1.27 (H)   GFR Estimate Latest Ref Range: >60 mL/min/1.73m2 70   Calcium Latest Ref Range: 8.5 - 10.1 mg/dL 9.2   Anion Gap Latest Ref Range: 3 - 14 mmol/L 4   Magnesium Latest Ref Range: 1.6 - 2.3 mg/dL 1.9   Albumin Latest Ref Range: 3.4 - 5.0 g/dL 4.1   Protein Total Latest Ref Range: 6.8 - 8.8 g/dL 7.0   Bilirubin Total Latest Ref Range: 0.2 - 1.3 mg/dL 0.9   Alkaline Phosphatase Latest Ref Range: 40 - 150 U/L 64   ALT Latest Ref Range: 0 - 70 U/L 25   AST Latest Ref Range: 0 - 45 U/L 20   Bilirubin Direct Latest Ref Range: 0.0 - 0.2 mg/dL 0.2   Glucose Latest Ref Range: 70 - 99 mg/dL 122 (H)       Cardiac MRI 3/9/2017  Great arteries with native atrioventricular   concordance and ventricular arterial discordance with the aorta anterior   to the pulmonary artery (D-transposition of the great arteries).  2. Status post interatrial baffle with the superior vena cava and inferior   vena cava baffled to the left (subpulmonic) ventricle without obstruction.    There is no evidence of a baffle leak.    3. Systemic right ventricle:  a. The right ventricle is hypertrophied.  b. Decreased systolic function with an ejection fraction of 33%.  c. Severe dilation of the right ventricle with an end-diastolic volume of   208 mL/m  (previously 188 mL/m ) and end-systolic volume  of 140 mL/m    (previous 123 mL/m ).  4. The right ventricle connects with the anterior aorta.  The coronary   artery origins are usual for transposition.  Left-sided aortic arch with   measurements above.  5. The pulmonary venous baffle to the right ventricle is widely patent.  6. The subpulmonary left ventricle has normal systolic function with   decreased myocardial mass.  The left ventricle connects to the pulmonary   artery, which is posterior to the aorta.  7. No significant change from previous cardiac MRI of 9/4/2012 except an   increased size in the indexed right ventricular diastolic and systolic   Volumes    Echo (3/30/18):   Patient after atrial switch operation for complete transposition of the great arteries. Technically difficult study due to poor acoustic windows. No baffle obstruction or leaks seen. There is moderate right ventricular enlargement. Single plane right ventricular EF 35-40 %. Normal left ventricular systolic function. No outflow obstruction. Mild tricuspid regurgitation.    EKG (3/30:18): Sinus rhythm, RAD, RBBB, prominent RV forces with repolarization abnormality.      Echo 1/17/19  Patient after atrial switch operation for complete transposition of the great  arteries. Technically difficult study due to poor acoustic windows. There is  moderate right ventricular enlargement. The right ventricular function is  qualitatively moderately depressed. Qualitivley the right ventricle function  is unchanged form the 3/30/2018 echo. Mild (1+) tricuspid valve insufficiency.  No obvious baffle obstruction or leaks seen. Qualitatively normal left  ventricular systolic function. Mild (1+) pulmonary valve insufficiency.  Limited visualization of the LPA suggests narrowing. RPA not seen.    Mercedes Feb 2019      CMR Report 3-   SUMMARY  Clinical history:39-year old male with a history of d-TGA (with baffle repair), small VSD (closed with a  stitch) and combined liver/renal transplant. He is known  to have depressed systemic ventricle function. CMR  to evaluate RV function.   Comparison CMR: 03/09/2017 ([facility])  SITUS: There is normal situs The liver is in the right upper quadrant and a single spleen in the left  upper quadrant.   CAVAE: There is a single SVC and a single IVC.both drain unobstructed into the sub-pulmonic ventricle via  baffle.  SVC baffle measures 1.6 x 1.0 cm. IVC baffle measures 1.5 x 1.4 cm. The intrahepatic segment of  the native IVC measures 13.9 x 5.3 mm at its narrowest point. There does not appear to be any baffle  obstruction or leakage.   PULMONARY VEINS: There are four pulmonary veins with two left-sided veins and two right-sided veins.They  drain in an unobstructed fashion via baffle to the systemic ventricle.   ATRIOVENTRICULAR CONNECTION: The atrioventricular connection is discordant. There is mild-moderate  regurgitation of the systemic atrioventricular (tricuspid) valve. There is mild FARHANA of the sub-pulmonic AV  valve (mitral) without septal contact or obstruction.   VENTRICLES: There is levocardia. The systemic (morphologic right) ventricle is moderately enlarged with  severe hypertrophy. The global systolic function of the systemic ventricle is severely reduced. The  systemic ventricle's EF is 27%. The subpulmonic (morphologic left) ventricle is smallï cavity size. The  subpulmonic ventricle exhibits wall thinning and a pattern of fatty metaplasia with enhancement on LGE  imaging in the mid-anterolateral, mid anteroseptal, apical septal, and apical lateral segments. The global  systolic function of the subpulmonic ventricle is normal. The subpulmonic ventricle's EF is 57%. There are  no regional wall motion abnormalities. The VSD repair is visualized in the basal septum and is intact.  There is no evidence of intracardiac shunting (Qp/Qs 1.0).  VENTRICULOARTERIAL CONNECTION: The ventriculoarterial connection is discordant.There is no  significant  valvular disease.   AORTA AND SUPRA-AORTIC VESSELS: The aortic arch is left-sided with normal branching of the vessels.   PULMONARY ARTERY: The main pulmonary artery is mildly enlarged. The branch pulmonary arteries are normal in  size.   CONCLUSIONS: d-TGA with atrial baffle repair.  The systemic ventricle is moderately enlarged with severely  reduced function, EF 27%. The subpulmonic ventricle is small in size with normal function, EF 57%. Normal  baffles without any obstruction, thrombus or leakage. There is mild-moderate regurgitation of the systemic  atrioventricular (tricuspid) valve. There is no residual intracardiac shunting (Qp/Qs 1.0).   The subpulmonic ventricle exhibits extensive fatty metaplasia in the mid and apical segments of unclear  significance. Narrowing of intra-hepatic IVC of unclear significance.   Compared to the prior examination dated 03/09/2017 the systemic ventricle's function appears similar  visually. The fatty metaplasia of sub-pulmonic ventricle and narrowing of intra-hepatic IVC  is unchanged  from prior examinations.     Zio Sept 2021      Echo 11/23/21  Patient after atrial switch operation for complete transposition of the great  arteries. Technically difficult study due to poor acoustic windows. Moderate  right ventricular dilatation and hypertrophy with moderate to severely  decreased systolic function (no significant change compared to last  echocardiogram). Mild tricuspid valve insufficiency. The pulmonary venous  baffle is unobstructed; the systmic venous baffle was not well seen.  Qualitatively, normal left ventricular systolic function. Mild pulmonary valve  Insufficiency.      Assessment and Plan:  40 yo M with history of d - transposition of the great vessels s/p baffle repair in 1981 with small VSD stitch closure, atrial flutter s/p ablation and recent cardioversion, with stably depressed systemic RV function, s/p liver and kidney transplant for EtOH abuse,  post-transplant lymphoproliferative disorder, papillary thyroid cancer s/p thryoidectomy presents for ongoing evaluation and management.    1.  TGA s/p atrial switch now with stably depressed systemic ventricular function:  Although ventricular function is depressed this has been stable for the past several years as confirmed by recent echo.  In regard to heart failure medical regimen, his systemic ventricle is an anatomic right ventricle and there is no proven therapies to improve morbidity or mortality in right ventricular failure and especially systemic right ventricular failure.  However, given that his BP is elevated above goal will increase lisinopril from 10mg daily to 20mg daily.  Repeat labs in 2-3 weeks.  Will continue current dose of metoprolol xl.  Encouraged patient to begin regular aerobic exercise aiming for at least 150 minutes of moderate physical activity or 75 minutes of vigorous physical activity - or an equal combination of both - each week. and follow low-salt, heart healthy diet.    2.  H/o atrial flutter s/p ablation: Recent Zio monitor with no recurreance.  Continue apixaban and metoprolol xl.  Seen by congenital EP today.  Please see their note for detailed findings and plan.    Follow-up:  Labs in 2-3 weeks.  RTC in one year with labs, echo ad EKG.  Will be happy to see sooner if change in clinical status or new questions/concerns arise.      Francesca Alba MD  Section Head - Advanced Heart Failure, Transplantation and Mechanical Circulatory Support  Director - Adult Congenital and Cardiovascular Genetics Center  Associate Professor of Medicine, [facility]    I spent a total of 30 minutes today with the patient personally reviewing recent cardiac testing and/or laboratory results, today's history and examination, and discussion and counseling with the patient.

## 2021-11-23 NOTE — NURSING NOTE
Chief Complaint   Patient presents with     Follow Up     D-TGA     Vitals were taken and medications were reconciled. EKG was performed   JASIEL Esquivel  8:54 AM

## 2021-11-26 LAB
ATRIAL RATE - MUSE: 61 BPM
DIASTOLIC BLOOD PRESSURE - MUSE: NORMAL MMHG
INTERPRETATION ECG - MUSE: NORMAL
P AXIS - MUSE: 92 DEGREES
PR INTERVAL - MUSE: 202 MS
QRS DURATION - MUSE: 122 MS
QT - MUSE: 430 MS
QTC - MUSE: 432 MS
R AXIS - MUSE: 125 DEGREES
SYSTOLIC BLOOD PRESSURE - MUSE: NORMAL MMHG
T AXIS - MUSE: 74 DEGREES
VENTRICULAR RATE- MUSE: 61 BPM

## 2021-12-15 ENCOUNTER — CARE COORDINATION (OUTPATIENT)
Dept: CARDIOLOGY | Facility: CLINIC | Age: 41
End: 2021-12-15
Payer: COMMERCIAL

## 2021-12-15 NOTE — PROGRESS NOTES
Called patient to remind him to get his lab draw completed. He said he would call his local [place] clinic this week to get it scheduled.   Analisa Cerna RN on 12/15/2021 at 1:35 PM

## 2021-12-20 ENCOUNTER — LAB (OUTPATIENT)
Dept: LAB | Facility: CLINIC | Age: 41
End: 2021-12-20
Payer: COMMERCIAL

## 2021-12-20 DIAGNOSIS — I48.92 ATRIAL FLUTTER, UNSPECIFIED TYPE (H): ICD-10-CM

## 2021-12-20 DIAGNOSIS — Z94.0 KIDNEY TRANSPLANTED: ICD-10-CM

## 2021-12-20 DIAGNOSIS — Q20.3 COMPLETE TRANSPOSITION OF GREAT VESSELS: ICD-10-CM

## 2021-12-20 LAB
ALBUMIN SERPL-MCNC: 4.2 G/DL (ref 3.4–5)
ALP SERPL-CCNC: 72 U/L (ref 40–150)
ALT SERPL W P-5'-P-CCNC: 61 U/L (ref 0–70)
ANION GAP SERPL CALCULATED.3IONS-SCNC: 5 MMOL/L (ref 3–14)
AST SERPL W P-5'-P-CCNC: 38 U/L (ref 0–45)
BASOPHILS # BLD AUTO: 0 10E3/UL (ref 0–0.2)
BASOPHILS NFR BLD AUTO: 0 %
BILIRUB SERPL-MCNC: 0.5 MG/DL (ref 0.2–1.3)
BUN SERPL-MCNC: 16 MG/DL (ref 7–30)
CALCIUM SERPL-MCNC: 9.4 MG/DL (ref 8.5–10.1)
CHLORIDE BLD-SCNC: 108 MMOL/L (ref 94–109)
CO2 SERPL-SCNC: 25 MMOL/L (ref 20–32)
CREAT SERPL-MCNC: 1.2 MG/DL (ref 0.66–1.25)
EOSINOPHIL # BLD AUTO: 0.1 10E3/UL (ref 0–0.7)
EOSINOPHIL NFR BLD AUTO: 1 %
ERYTHROCYTE [DISTWIDTH] IN BLOOD BY AUTOMATED COUNT: 12.9 % (ref 10–15)
GFR SERPL CREATININE-BSD FRML MDRD: 75 ML/MIN/1.73M2
GLUCOSE BLD-MCNC: 106 MG/DL (ref 70–99)
HCT VFR BLD AUTO: 44.6 % (ref 40–53)
HGB BLD-MCNC: 14.8 G/DL (ref 13.3–17.7)
LYMPHOCYTES # BLD AUTO: 1 10E3/UL (ref 0.8–5.3)
LYMPHOCYTES NFR BLD AUTO: 21 %
MCH RBC QN AUTO: 30.1 PG (ref 26.5–33)
MCHC RBC AUTO-ENTMCNC: 33.2 G/DL (ref 31.5–36.5)
MCV RBC AUTO: 91 FL (ref 78–100)
MONOCYTES # BLD AUTO: 0.5 10E3/UL (ref 0–1.3)
MONOCYTES NFR BLD AUTO: 10 %
NEUTROPHILS # BLD AUTO: 3.3 10E3/UL (ref 1.6–8.3)
NEUTROPHILS NFR BLD AUTO: 68 %
PLATELET # BLD AUTO: 150 10E3/UL (ref 150–450)
POTASSIUM BLD-SCNC: 4.2 MMOL/L (ref 3.4–5.3)
PROT SERPL-MCNC: 7.4 G/DL (ref 6.8–8.8)
RBC # BLD AUTO: 4.91 10E6/UL (ref 4.4–5.9)
SODIUM SERPL-SCNC: 138 MMOL/L (ref 133–144)
WBC # BLD AUTO: 4.9 10E3/UL (ref 4–11)

## 2021-12-20 PROCEDURE — 85025 COMPLETE CBC W/AUTO DIFF WBC: CPT

## 2021-12-20 PROCEDURE — 80053 COMPREHEN METABOLIC PANEL: CPT

## 2021-12-20 PROCEDURE — 36415 COLL VENOUS BLD VENIPUNCTURE: CPT

## 2021-12-22 RX ORDER — METOPROLOL SUCCINATE 25 MG/1
25 TABLET, EXTENDED RELEASE ORAL 2 TIMES DAILY
Qty: 180 TABLET | Refills: 3 | Status: SHIPPED | OUTPATIENT
Start: 2021-12-22 | End: 2022-12-22

## 2022-01-02 ENCOUNTER — HEALTH MAINTENANCE LETTER (OUTPATIENT)
Age: 42
End: 2022-01-02

## 2022-01-04 ENCOUNTER — LAB (OUTPATIENT)
Dept: LAB | Facility: CLINIC | Age: 42
End: 2022-01-04
Payer: COMMERCIAL

## 2022-01-04 DIAGNOSIS — Z13.220 LIPID SCREENING: ICD-10-CM

## 2022-01-04 DIAGNOSIS — Z94.4 LIVER REPLACED BY TRANSPLANT (H): ICD-10-CM

## 2022-01-04 LAB
ALBUMIN SERPL-MCNC: 4.3 G/DL (ref 3.4–5)
ALP SERPL-CCNC: 65 U/L (ref 40–150)
ALT SERPL W P-5'-P-CCNC: 42 U/L (ref 0–70)
ANION GAP SERPL CALCULATED.3IONS-SCNC: 6 MMOL/L (ref 3–14)
AST SERPL W P-5'-P-CCNC: 21 U/L (ref 0–45)
BILIRUB DIRECT SERPL-MCNC: 0.1 MG/DL (ref 0–0.2)
BILIRUB SERPL-MCNC: 0.4 MG/DL (ref 0.2–1.3)
BUN SERPL-MCNC: 16 MG/DL (ref 7–30)
CALCIUM SERPL-MCNC: 9.5 MG/DL (ref 8.5–10.1)
CHLORIDE BLD-SCNC: 106 MMOL/L (ref 94–109)
CO2 SERPL-SCNC: 26 MMOL/L (ref 20–32)
CREAT SERPL-MCNC: 1.37 MG/DL (ref 0.66–1.25)
ERYTHROCYTE [DISTWIDTH] IN BLOOD BY AUTOMATED COUNT: 12.9 % (ref 10–15)
GFR SERPL CREATININE-BSD FRML MDRD: 66 ML/MIN/1.73M2
GLUCOSE BLD-MCNC: 115 MG/DL (ref 70–99)
HCT VFR BLD AUTO: 46.9 % (ref 40–53)
HGB BLD-MCNC: 15.8 G/DL (ref 13.3–17.7)
MAGNESIUM SERPL-MCNC: 1.9 MG/DL (ref 1.6–2.3)
MCH RBC QN AUTO: 30.8 PG (ref 26.5–33)
MCHC RBC AUTO-ENTMCNC: 33.7 G/DL (ref 31.5–36.5)
MCV RBC AUTO: 91 FL (ref 78–100)
PLATELET # BLD AUTO: 150 10E3/UL (ref 150–450)
POTASSIUM BLD-SCNC: 4.3 MMOL/L (ref 3.4–5.3)
PROT SERPL-MCNC: 7.3 G/DL (ref 6.8–8.8)
RBC # BLD AUTO: 5.13 10E6/UL (ref 4.4–5.9)
SODIUM SERPL-SCNC: 138 MMOL/L (ref 133–144)
WBC # BLD AUTO: 4.8 10E3/UL (ref 4–11)

## 2022-01-04 PROCEDURE — 83735 ASSAY OF MAGNESIUM: CPT

## 2022-01-04 PROCEDURE — 80053 COMPREHEN METABOLIC PANEL: CPT

## 2022-01-04 PROCEDURE — 80197 ASSAY OF TACROLIMUS: CPT

## 2022-01-04 PROCEDURE — 36415 COLL VENOUS BLD VENIPUNCTURE: CPT

## 2022-01-04 PROCEDURE — 82248 BILIRUBIN DIRECT: CPT

## 2022-01-04 PROCEDURE — 85027 COMPLETE CBC AUTOMATED: CPT

## 2022-01-05 LAB
TACROLIMUS BLD-MCNC: 4.3 UG/L (ref 5–15)
TME LAST DOSE: ABNORMAL H
TME LAST DOSE: ABNORMAL H

## 2022-01-27 DIAGNOSIS — I48.92 ATRIAL FLUTTER, UNSPECIFIED TYPE (H): ICD-10-CM

## 2022-01-27 DIAGNOSIS — Z94.4 LIVER REPLACED BY TRANSPLANT (H): ICD-10-CM

## 2022-01-27 DIAGNOSIS — Z94.0 S/P KIDNEY TRANSPLANT: ICD-10-CM

## 2022-01-31 RX ORDER — SULFAMETHOXAZOLE AND TRIMETHOPRIM 400; 80 MG/1; MG/1
1 TABLET ORAL DAILY
Qty: 30 TABLET | Refills: 11 | Status: SHIPPED | OUTPATIENT
Start: 2022-01-31 | End: 2023-01-19

## 2022-02-01 RX ORDER — APIXABAN 5 MG/1
TABLET, FILM COATED ORAL
Qty: 180 TABLET | Refills: 2 | Status: SHIPPED | OUTPATIENT
Start: 2022-02-01 | End: 2022-11-01

## 2022-03-01 ENCOUNTER — LAB (OUTPATIENT)
Dept: LAB | Facility: CLINIC | Age: 42
End: 2022-03-01
Payer: COMMERCIAL

## 2022-03-01 DIAGNOSIS — D47.Z1 POST-TRANSPLANT LYMPHOPROLIFERATIVE DISORDER (H): ICD-10-CM

## 2022-03-01 DIAGNOSIS — Z94.4 LIVER REPLACED BY TRANSPLANT (H): ICD-10-CM

## 2022-03-01 DIAGNOSIS — Z13.220 LIPID SCREENING: ICD-10-CM

## 2022-03-01 LAB
ALBUMIN SERPL-MCNC: 4.2 G/DL (ref 3.4–5)
ALP SERPL-CCNC: 62 U/L (ref 40–150)
ALT SERPL W P-5'-P-CCNC: 31 U/L (ref 0–70)
ANION GAP SERPL CALCULATED.3IONS-SCNC: 4 MMOL/L (ref 3–14)
AST SERPL W P-5'-P-CCNC: 16 U/L (ref 0–45)
BASOPHILS # BLD AUTO: 0 10E3/UL (ref 0–0.2)
BASOPHILS NFR BLD AUTO: 0 %
BILIRUB DIRECT SERPL-MCNC: 0.1 MG/DL (ref 0–0.2)
BILIRUB SERPL-MCNC: 0.5 MG/DL (ref 0.2–1.3)
BUN SERPL-MCNC: 18 MG/DL (ref 7–30)
CALCIUM SERPL-MCNC: 9.3 MG/DL (ref 8.5–10.1)
CHLORIDE BLD-SCNC: 108 MMOL/L (ref 94–109)
CO2 SERPL-SCNC: 28 MMOL/L (ref 20–32)
CREAT SERPL-MCNC: 1.33 MG/DL (ref 0.66–1.25)
EOSINOPHIL # BLD AUTO: 0.1 10E3/UL (ref 0–0.7)
EOSINOPHIL NFR BLD AUTO: 1 %
ERYTHROCYTE [DISTWIDTH] IN BLOOD BY AUTOMATED COUNT: 13 % (ref 10–15)
GFR SERPL CREATININE-BSD FRML MDRD: 69 ML/MIN/1.73M2
GLUCOSE BLD-MCNC: 115 MG/DL (ref 70–99)
HCT VFR BLD AUTO: 45.4 % (ref 40–53)
HGB BLD-MCNC: 15.4 G/DL (ref 13.3–17.7)
LYMPHOCYTES # BLD AUTO: 1.2 10E3/UL (ref 0.8–5.3)
LYMPHOCYTES NFR BLD AUTO: 23 %
MAGNESIUM SERPL-MCNC: 1.8 MG/DL (ref 1.6–2.3)
MCH RBC QN AUTO: 30.2 PG (ref 26.5–33)
MCHC RBC AUTO-ENTMCNC: 33.9 G/DL (ref 31.5–36.5)
MCV RBC AUTO: 89 FL (ref 78–100)
MONOCYTES # BLD AUTO: 0.5 10E3/UL (ref 0–1.3)
MONOCYTES NFR BLD AUTO: 9 %
NEUTROPHILS # BLD AUTO: 3.5 10E3/UL (ref 1.6–8.3)
NEUTROPHILS NFR BLD AUTO: 67 %
PLATELET # BLD AUTO: 146 10E3/UL (ref 150–450)
POTASSIUM BLD-SCNC: 4.3 MMOL/L (ref 3.4–5.3)
PROT SERPL-MCNC: 7.2 G/DL (ref 6.8–8.8)
RBC # BLD AUTO: 5.1 10E6/UL (ref 4.4–5.9)
SODIUM SERPL-SCNC: 140 MMOL/L (ref 133–144)
TACROLIMUS BLD-MCNC: 3.4 UG/L (ref 5–15)
TME LAST DOSE: ABNORMAL H
TME LAST DOSE: ABNORMAL H
WBC # BLD AUTO: 5.2 10E3/UL (ref 4–11)

## 2022-03-01 PROCEDURE — 36415 COLL VENOUS BLD VENIPUNCTURE: CPT

## 2022-03-01 PROCEDURE — 80053 COMPREHEN METABOLIC PANEL: CPT

## 2022-03-01 PROCEDURE — 80197 ASSAY OF TACROLIMUS: CPT

## 2022-03-01 PROCEDURE — 83735 ASSAY OF MAGNESIUM: CPT

## 2022-03-01 PROCEDURE — 85025 COMPLETE CBC W/AUTO DIFF WBC: CPT

## 2022-03-01 PROCEDURE — 82248 BILIRUBIN DIRECT: CPT

## 2022-03-16 ENCOUNTER — APPOINTMENT (OUTPATIENT)
Dept: LAB | Facility: CLINIC | Age: 42
End: 2022-03-16
Attending: INTERNAL MEDICINE
Payer: COMMERCIAL

## 2022-03-16 ENCOUNTER — OFFICE VISIT (OUTPATIENT)
Dept: TRANSPLANT | Facility: CLINIC | Age: 42
End: 2022-03-16
Attending: INTERNAL MEDICINE
Payer: COMMERCIAL

## 2022-03-16 VITALS
OXYGEN SATURATION: 96 % | WEIGHT: 204.6 LBS | BODY MASS INDEX: 29.29 KG/M2 | DIASTOLIC BLOOD PRESSURE: 80 MMHG | TEMPERATURE: 98.2 F | RESPIRATION RATE: 16 BRPM | SYSTOLIC BLOOD PRESSURE: 130 MMHG | HEIGHT: 70 IN | HEART RATE: 71 BPM

## 2022-03-16 DIAGNOSIS — D47.Z1 POST-TRANSPLANT LYMPHOPROLIFERATIVE DISORDER (H): Primary | ICD-10-CM

## 2022-03-16 LAB — LDH SERPL L TO P-CCNC: NORMAL U/L

## 2022-03-16 PROCEDURE — G0463 HOSPITAL OUTPT CLINIC VISIT: HCPCS

## 2022-03-16 PROCEDURE — 87799 DETECT AGENT NOS DNA QUANT: CPT | Performed by: INTERNAL MEDICINE

## 2022-03-16 PROCEDURE — 99215 OFFICE O/P EST HI 40 MIN: CPT | Performed by: INTERNAL MEDICINE

## 2022-03-16 PROCEDURE — 36415 COLL VENOUS BLD VENIPUNCTURE: CPT | Performed by: INTERNAL MEDICINE

## 2022-03-16 PROCEDURE — 83615 LACTATE (LD) (LDH) ENZYME: CPT | Performed by: INTERNAL MEDICINE

## 2022-03-16 ASSESSMENT — PAIN SCALES - GENERAL: PAINLEVEL: NO PAIN (0)

## 2022-03-16 NOTE — LETTER
"  3/16/2022     RE: Cricket Chen  [address]  [facility] [address]    Dear Colleague,    Thank you for referring your patient, Cricket Chen, to the [facility] BLOOD AND MARROW TRANSPLANT PROGRAM [place]. Please see a copy of my visit note below.    [facility] Visit       Disease and Transplant History:  1. 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   -- CellCept was reduced from 1500 b.i.d. to 250 twice a day, and  tacrolimus 3 mg twice a day was decreased to 2 mg twice a day.   2. He tolerated the RSST well and completed 4 weekly infusions of rituximab followed by R-COEP x4 (last cycle completed on 2017).   -- Post Treatment PET/CT scan and he had achieved CR1. Notably, his diagnostic PET/CT scan identified a thyroid nodule, which was consistent with papillary thyroid carcinoma based on the ultrasound-guided fine needle aspiration and he underwent thyroidectomy.    -- Repeat CT chest, abdomen and pelvis on 2019 did not show any evidence of active PTLD.  3. Observation        HPI: Isidro presents for follow-up today for his history of PTLD. Overall, feels well. He is working part-time and is fairly independent in his activities of daily living. No fevers. No lumps or bumps. No night sweats. No other complaints.     10 point ROS otherwise negative    /80 (BP Location: Right arm)   Pulse 71   Temp 98.2  F (36.8  C) (Oral)   Resp 16   Ht 1.778 m (5' 10\")   Wt 92.8 kg (204 lb 9.6 oz)   SpO2 96%   BMI 29.36 kg/m    GA: NAD. Appears as stated age.  HEENT: PERRL, OP Clear  Neck: Supple, no JVD  CV: RRR, no mrg  Lungs: CTAB, no wheezes  Abd: Soft, nt, nd  Lymph: No cervical LAD, no HSM  Ext: No edema. Warm  Neuro: AAO, moving all ext. Symmetric face  Skin: warm, no rashes  Psyc: Appropriate and cooperative    ECO       A/P: " 40 yo man with history of PTLD dx in 2017 treated with Rituxan and then R-COEP X 4 with CR.     1. History of PTLD in ongoing remission 5+ years out. We discussed that at this point, we can discharge him from clinic and have him follow prn. He was agreeable to that and we went over s/s to call/come back for that include lumps/bumps or constitutional symptoms/     2. Renal: creatinine stable in the mid 1.5 range  - stable immunosuppression with good Tac level     3.ID: no current infectious issues     4. Papillary Thyroid Cancer Status post resection: on synthroid. Follows with endocrine     Final Plan:  follow up prn.    40 minutes spent on the date of the encounter doing chart review, interpretation of results, patient visit, documentation and coordination of care.    Sincerely,    Cricket Kenyon MD

## 2022-03-16 NOTE — NURSING NOTE
Chief Complaint   Patient presents with     Blood Draw     Labs drawn via  by RN. Vitals taken.     Labs collected from venipuncture by RN. Vitals taken. Checked in for appointment(s).    Salma Almendarez RN

## 2022-03-16 NOTE — NURSING NOTE
"Oncology Rooming Note    March 16, 2022 1:33 PM   Cricket Chen is a 41 year old male who presents for:    Chief Complaint   Patient presents with     Blood Draw     Labs drawn via  by RN. Vitals taken.     Oncology Clinic Visit     [facility] RETURN - PTLD     Initial Vitals: /80 (BP Location: Right arm)   Pulse 71   Temp 98.2  F (36.8  C) (Oral)   Resp 16   Ht 1.778 m (5' 10\")   Wt 92.8 kg (204 lb 9.6 oz)   SpO2 96%   BMI 29.36 kg/m   Estimated body mass index is 29.36 kg/m  as calculated from the following:    Height as of this encounter: 1.778 m (5' 10\").    Weight as of this encounter: 92.8 kg (204 lb 9.6 oz). Body surface area is 2.14 meters squared.  No Pain (0) Comment: Data Unavailable   No LMP for male patient.  Allergies reviewed: Yes  Medications reviewed: Yes    Medications: Medication refills not needed today.  Pharmacy name entered into [facility]:    [facility] [place] [facility] [address] [facility] [address] [facility] - [place], [place] - [address]    Clinical concerns: No new concerns. Ch[place] was notified.      Fernando Doan LPN            "

## 2022-03-17 LAB
EBV DNA COPIES/ML, INSTRUMENT: 677 COPIES/ML
EBV DNA SPEC NAA+PROBE-LOG#: 2.8 {LOG_COPIES}/ML

## 2022-03-17 NOTE — PROGRESS NOTES
"[facility] Clinic Visit       Disease and Transplant History:  1. 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   -- CellCept was reduced from 1500 b.i.d. to 250 twice a day, and  tacrolimus 3 mg twice a day was decreased to 2 mg twice a day.   2. He tolerated the RSST well and completed 4 weekly infusions of rituximab followed by R-COEP x4 (last cycle completed on 2017).   -- Post Treatment PET/CT scan and he had achieved CR1. Notably, his diagnostic PET/CT scan identified a thyroid nodule, which was consistent with papillary thyroid carcinoma based on the ultrasound-guided fine needle aspiration and he underwent thyroidectomy.    -- Repeat CT chest, abdomen and pelvis on 2019 did not show any evidence of active PTLD.  3. Observation        HPI: Isidro presents for follow-up today for his history of PTLD. Overall, feels well. He is working part-time and is fairly independent in his activities of daily living. No fevers. No lumps or bumps. No night sweats. No other complaints.     10 point ROS otherwise negative    /80 (BP Location: Right arm)   Pulse 71   Temp 98.2  F (36.8  C) (Oral)   Resp 16   Ht 1.778 m (5' 10\")   Wt 92.8 kg (204 lb 9.6 oz)   SpO2 96%   BMI 29.36 kg/m    GA: NAD. Appears as stated age.  HEENT: PERRL, OP Clear  Neck: Supple, no JVD  CV: RRR, no mrg  Lungs: CTAB, no wheezes  Abd: Soft, nt, nd  Lymph: No cervical LAD, no HSM  Ext: No edema. Warm  Neuro: AAO, moving all ext. Symmetric face  Skin: warm, no rashes  Psyc: Appropriate and cooperative    ECO       A/P: 40 yo man with history of PTLD dx in 2017 treated with Rituxan and then R-COEP X 4 with CR.     1. History of PTLD in ongoing remission 5+ years out. We discussed that at this point, we can discharge him from clinic and have him follow prn. He was agreeable to that and we went over " s/s to call/come back for that include lumps/bumps or constitutional symptoms/     2. Renal: creatinine stable in the mid 1.5 range  - stable immunosuppression with good Tac level     3.ID: no current infectious issues     4. Papillary Thyroid Cancer Status post resection: on synthroid. Follows with endocrine     Final Plan:  follow up prn.    40 minutes spent on the date of the encounter doing chart review, interpretation of results, patient visit, documentation and coordination of care.

## 2022-03-24 DIAGNOSIS — Z94.4 LIVER REPLACED BY TRANSPLANT (H): Primary | ICD-10-CM

## 2022-04-04 DIAGNOSIS — Z94.0 KIDNEY TRANSPLANT RECIPIENT: Primary | ICD-10-CM

## 2022-04-04 DIAGNOSIS — Z94.4 LIVER REPLACED BY TRANSPLANT (H): ICD-10-CM

## 2022-04-04 DIAGNOSIS — Z94.0 S/P KIDNEY TRANSPLANT: ICD-10-CM

## 2022-04-04 DIAGNOSIS — Z79.60 LONG-TERM USE OF IMMUNOSUPPRESSANT MEDICATION: ICD-10-CM

## 2022-04-04 RX ORDER — TACROLIMUS 0.5 MG/1
CAPSULE ORAL
Qty: 90 CAPSULE | Refills: 3 | Status: SHIPPED | OUTPATIENT
Start: 2022-04-04 | End: 2023-03-20

## 2022-04-04 RX ORDER — TACROLIMUS 1 MG/1
CAPSULE ORAL
Qty: 180 CAPSULE | Refills: 3 | Status: SHIPPED | OUTPATIENT
Start: 2022-04-04 | End: 2023-01-20

## 2022-04-04 RX ORDER — MYCOPHENOLATE MOFETIL 250 MG/1
500 CAPSULE ORAL 2 TIMES DAILY
Qty: 120 CAPSULE | Refills: 11 | Status: SHIPPED | OUTPATIENT
Start: 2022-04-04 | End: 2022-06-07

## 2022-04-24 ENCOUNTER — HEALTH MAINTENANCE LETTER (OUTPATIENT)
Age: 42
End: 2022-04-24

## 2022-05-03 ENCOUNTER — LAB (OUTPATIENT)
Dept: LAB | Facility: CLINIC | Age: 42
End: 2022-05-03
Payer: COMMERCIAL

## 2022-05-03 DIAGNOSIS — Z13.220 LIPID SCREENING: ICD-10-CM

## 2022-05-03 DIAGNOSIS — Z94.4 LIVER REPLACED BY TRANSPLANT (H): ICD-10-CM

## 2022-05-03 LAB
ALBUMIN SERPL-MCNC: 4.1 G/DL (ref 3.4–5)
ALP SERPL-CCNC: 61 U/L (ref 40–150)
ALT SERPL W P-5'-P-CCNC: 40 U/L (ref 0–70)
ANION GAP SERPL CALCULATED.3IONS-SCNC: 9 MMOL/L (ref 3–14)
AST SERPL W P-5'-P-CCNC: 21 U/L (ref 0–45)
BILIRUB DIRECT SERPL-MCNC: <0.1 MG/DL (ref 0–0.2)
BILIRUB SERPL-MCNC: 0.4 MG/DL (ref 0.2–1.3)
BUN SERPL-MCNC: 14 MG/DL (ref 7–30)
CALCIUM SERPL-MCNC: 9.5 MG/DL (ref 8.5–10.1)
CHLORIDE BLD-SCNC: 113 MMOL/L (ref 94–109)
CO2 SERPL-SCNC: 20 MMOL/L (ref 20–32)
CREAT SERPL-MCNC: 1.34 MG/DL (ref 0.66–1.25)
ERYTHROCYTE [DISTWIDTH] IN BLOOD BY AUTOMATED COUNT: 13.6 % (ref 10–15)
GFR SERPL CREATININE-BSD FRML MDRD: 68 ML/MIN/1.73M2
GLUCOSE BLD-MCNC: 135 MG/DL (ref 70–99)
HCT VFR BLD AUTO: 45.5 % (ref 40–53)
HGB BLD-MCNC: 15.1 G/DL (ref 13.3–17.7)
MAGNESIUM SERPL-MCNC: 2 MG/DL (ref 1.6–2.3)
MCH RBC QN AUTO: 30.4 PG (ref 26.5–33)
MCHC RBC AUTO-ENTMCNC: 33.2 G/DL (ref 31.5–36.5)
MCV RBC AUTO: 92 FL (ref 78–100)
PLATELET # BLD AUTO: 140 10E3/UL (ref 150–450)
POTASSIUM BLD-SCNC: 4.2 MMOL/L (ref 3.4–5.3)
PROT SERPL-MCNC: 7 G/DL (ref 6.8–8.8)
RBC # BLD AUTO: 4.97 10E6/UL (ref 4.4–5.9)
SODIUM SERPL-SCNC: 142 MMOL/L (ref 133–144)
TACROLIMUS BLD-MCNC: 3.6 UG/L (ref 5–15)
TME LAST DOSE: ABNORMAL H
TME LAST DOSE: ABNORMAL H
WBC # BLD AUTO: 4.8 10E3/UL (ref 4–11)

## 2022-05-03 PROCEDURE — 36415 COLL VENOUS BLD VENIPUNCTURE: CPT

## 2022-05-03 PROCEDURE — 82248 BILIRUBIN DIRECT: CPT

## 2022-05-03 PROCEDURE — 85027 COMPLETE CBC AUTOMATED: CPT

## 2022-05-03 PROCEDURE — 80197 ASSAY OF TACROLIMUS: CPT

## 2022-05-03 PROCEDURE — 80053 COMPREHEN METABOLIC PANEL: CPT

## 2022-05-03 PROCEDURE — 83735 ASSAY OF MAGNESIUM: CPT

## 2022-05-04 DIAGNOSIS — E83.42 HYPOMAGNESEMIA: ICD-10-CM

## 2022-05-04 RX ORDER — MAGNESIUM OXIDE 400 MG/1
400 TABLET ORAL 2 TIMES DAILY
Qty: 120 TABLET | Refills: 11 | Status: SHIPPED | OUTPATIENT
Start: 2022-05-04 | End: 2023-07-03

## 2022-05-23 DIAGNOSIS — E89.0 POSTOPERATIVE HYPOTHYROIDISM: ICD-10-CM

## 2022-05-23 DIAGNOSIS — C73 PAPILLARY THYROID CARCINOMA (H): ICD-10-CM

## 2022-05-23 RX ORDER — LEVOTHYROXINE SODIUM 137 UG/1
137 TABLET ORAL DAILY
Qty: 90 TABLET | Refills: 0 | Status: SHIPPED | OUTPATIENT
Start: 2022-05-23 | End: 2022-08-25

## 2022-06-03 ENCOUNTER — VIRTUAL VISIT (OUTPATIENT)
Dept: GASTROENTEROLOGY | Facility: CLINIC | Age: 42
End: 2022-06-03
Attending: INTERNAL MEDICINE
Payer: COMMERCIAL

## 2022-06-03 DIAGNOSIS — Z94.4 LIVER REPLACED BY TRANSPLANT (H): ICD-10-CM

## 2022-06-03 PROCEDURE — 99213 OFFICE O/P EST LOW 20 MIN: CPT | Mod: 95 | Performed by: INTERNAL MEDICINE

## 2022-06-03 ASSESSMENT — PAIN SCALES - GENERAL: PAINLEVEL: NO PAIN (0)

## 2022-06-03 NOTE — PROGRESS NOTES
[facility]     A/P  Mr. Chen is a 41 Y M s/p LKT 5/2016 for ETOH.    IS per Dr. Sidhu. No changes to medications today. Continue monitoring labs and IS level every 3 months to assess for appropriate dosing and side effects from IS including BOUBACAR, pancytopenia, hyperkalemia, hypomagnesemia.    Graft function Liver doing very well.    PTLD and thyroid ca Post transplant course c/b PTLD and thyroid ca. Oncology monitoring. He would be following as needed now.  Covid Discussed Covid vaccination and antibodies at length. Discussed Evusheld and repeating a booster.  Proph Recommend annual dermatology exam    Will see back in 1 year  Pt was discussed with Dr. Galvan.    Lex Roberts MD  Hepatology/ Liver Transplant Fellow  [facility]  ATTENDING NOTE HEPATOLOGY    I saw and discussed this patient with the fellow and participated in the decision making. I agree with the fellow's note. Laureen Galvan MD    ===================================================================  PCP: Dr. David Moser     SUBJECTIVE  Mr. Chen is a 41 Y M s/p DDLKT 5/2016 ETOH.  He is doing well.     Up to date with his visits with various doctors.  Feeling well. Has had a quiet year, health-wise. He is busy working.  Had covid vaccine x2 and 1 booster in Aug 2021.     EXPLANT: No HCC  IS: Prograf 3-5 and MMF (per Dr. Sidhu)  LABS: Up to date, excellent liver function.  Liver Function Studies - Recent Labs   Lab Test 05/03/22  1125   PROTTOTAL 7.0   ALBUMIN 4.1   BILITOTAL 0.4   ALKPHOS 61   AST 21   ALT 40     CBC RESULTS: Recent Labs   Lab Test 05/03/22  1125   WBC 4.8   RBC 4.97   HGB 15.1   HCT 45.5   MCV 92   MCH 30.4   MCHC 33.2   RDW 13.6   *     REJECTION: None.  BILIARY ISSUES: None  STENT: Out  KIDNEY FUNCTION:   Creatinine   Date Value Ref Range Status   05/03/2022 1.34 (H) 0.66 - 1.25 mg/dL Final   07/06/2021 1.27 (H) 0.66 - 1.25 mg/dL Final     BP:  "Good. Lisinopril, amlodipine  PREV:  Derm 10/2018. Flu shot done this year.  DISEASE RECURRENCE: Sober since prior to transplant  OTHER ISSUES:   PTLD in past. Recent imaging neg. Follows with oncology  Thyroid cancer, s/p thyroidectomy last surveillance July negative  Weight gain.   Had fistula out.     Exam  Gen Alert pleasant NAD  Resp No difficulty breathing. No cough  Skin No Jaundice  Eyes No icterus  Neuro FIGUEREDO  MSK no muscle wasting  Psyche Pleasant, appropriate. Well groomed.  Cricket Chen is a 41 year old male who is being evaluated via a billable video visit.      The patient has been notified of following:   \"This video visit will be conducted via a call between you and your physician/provider. We have found that certain health care needs can be provided without the need for an in-person physical exam.  This service lets us provide the care you need with a video conversation.  If a prescription is necessary we can send it directly to your pharmacy.  If lab work is needed we can place an order for that and you can then stop by our lab to have the test done at a later time. Video visits are billed at different rates depending on your insurance coverage.  Please reach out to your insurance provider with any questions.  If during the course of the call the physician/provider feels a video visit is not appropriate, you will not be charged for this service.\"   Patient has given verbal consent for Video visit? Yes    Video start: 10:50am  Video end: 11:21am                "

## 2022-06-03 NOTE — LETTER
6/3/2022         RE: Cricket Chen  [address]  [place] [address]        Dear Colleague,    Thank you for referring your patient, Cricket Chen, to the [facility]. Please see a copy of my visit note below.    [facility]     A/P  Mr. Chen is a 41 Y M s/p LKT 5/2016 for ETOH.    IS per Dr. Sidhu. No changes to medications today. Continue monitoring labs and IS level every 3 months to assess for appropriate dosing and side effects from IS including BOUBACAR, pancytopenia, hyperkalemia, hypomagnesemia.    Graft function Liver doing very well.    PTLD and thyroid ca Post transplant course c/b PTLD and thyroid ca. Oncology monitoring. He would be following as needed now.  Covid Discussed Covid vaccination and antibodies at length. Discussed Evusheld and repeating a booster.  Proph Recommend annual dermatology exam    Will see back in 1 year  Pt was discussed with Dr. Galvan.    Lex Roberts MD  Hepatology/ Liver Transplant Fellow  [facility]  ATTENDING NOTE HEPATOLOGY    I saw and discussed this patient with the fellow and participated in the decision making. I agree with the fellow's note. Laureen Galvan MD    ===================================================================  PCP: Dr. David Moser     SUBJECTIVE  Mr. Chen is a 41 Y M s/p DDLKT 5/2016 ETOH.  He is doing well.     Up to date with his visits with various doctors.  Feeling well. Has had a quiet year, health-wise. He is busy working.  Had covid vaccine x2 and 1 booster in Aug 2021.     EXPLANT: No HCC  IS: Prograf 3-5 and MMF (per Dr. Sidhu)  LABS: Up to date, excellent liver function.  Liver Function Studies - Recent Labs   Lab Test 05/03/22  1125   PROTTOTAL 7.0   ALBUMIN 4.1   BILITOTAL 0.4   ALKPHOS 61   AST 21   ALT 40     CBC RESULTS: Recent Labs   Lab Test 05/03/22  1125   WBC 4.8   RBC 4.97   HGB 15.1   HCT 45.5   MCV 92  "  MCH 30.4   MCHC 33.2   RDW 13.6   *     REJECTION: None.  BILIARY ISSUES: None  STENT: Out  KIDNEY FUNCTION:   Creatinine   Date Value Ref Range Status   05/03/2022 1.34 (H) 0.66 - 1.25 mg/dL Final   07/06/2021 1.27 (H) 0.66 - 1.25 mg/dL Final     BP: Good. Lisinopril, amlodipine  PREV:  Derm 10/2018. Flu shot done this year.  DISEASE RECURRENCE: Sober since prior to transplant  OTHER ISSUES:   PTLD in past. Recent imaging neg. Follows with oncology  Thyroid cancer, s/p thyroidectomy last surveillance July negative  Weight gain.   Had fistula out.     Exam  Gen Alert pleasant NAD  Resp No difficulty breathing. No cough  Skin No Jaundice  Eyes No icterus  Neuro FIGUEREDO  MSK no muscle wasting  Psyche Pleasant, appropriate. Well groomed.  Cricket Chen is a 41 year old male who is being evaluated via a billable video visit.      The patient has been notified of following:   \"This video visit will be conducted via a call between you and your physician/provider. We have found that certain health care needs can be provided without the need for an in-person physical exam.  This service lets us provide the care you need with a video conversation.  If a prescription is necessary we can send it directly to your pharmacy.  If lab work is needed we can place an order for that and you can then stop by our lab to have the test done at a later time. Video visits are billed at different rates depending on your insurance coverage.  Please reach out to your insurance provider with any questions.  If during the course of the call the physician/provider feels a video visit is not appropriate, you will not be charged for this service.\"   Patient has given verbal consent for Video visit? Yes    Video start: 10:50am  Video end: 11:21am        Again, thank you for allowing me to participate in the care of your patient.        Sincerely,        Laureen Galvan MD    "

## 2022-06-06 ENCOUNTER — HOME INFUSION (PRE-WILLOW HOME INFUSION) (OUTPATIENT)
Dept: PHARMACY | Facility: CLINIC | Age: 42
End: 2022-06-06
Payer: COMMERCIAL

## 2022-06-06 ENCOUNTER — TELEPHONE (OUTPATIENT)
Dept: TRANSPLANT | Facility: CLINIC | Age: 42
End: 2022-06-06
Payer: COMMERCIAL

## 2022-06-06 DIAGNOSIS — Z94.0 KIDNEY TRANSPLANT RECIPIENT: ICD-10-CM

## 2022-06-06 DIAGNOSIS — Z79.60 LONG-TERM USE OF IMMUNOSUPPRESSANT MEDICATION: ICD-10-CM

## 2022-06-06 DIAGNOSIS — Z94.0 KIDNEY TRANSPLANT RECIPIENT: Primary | ICD-10-CM

## 2022-06-06 NOTE — TELEPHONE ENCOUNTER
General  Route to LPN    Reason for call: Patient was about to get his 4th COVID vaccine and did an at home test & found out he has COVID. Patient is wondering what he should be doing.     Call back needed? Yes  Return Call Needed  Same as documented in contacts section  When to return call?: Same day: Route High Priority

## 2022-06-06 NOTE — TELEPHONE ENCOUNTER
ISSUE  Contacted pt who tested positive for COVID.   He states he is feeling okay.   He developed stuffy nose, sneezing, and cough 6/3/22.  Denies other symptoms.     PLAN  Referred pt for monoclonal antibodies through [facility]    Will route to Dr. Sidhu to see if he wants to make any temporary adjustments to IS medications or if pt is OK to just continue.     Afsaneh Spring RN on 6/6/2022 at 1:28 PM

## 2022-06-07 RX ORDER — MYCOPHENOLATE MOFETIL 250 MG/1
500 CAPSULE ORAL 2 TIMES DAILY
Qty: 120 CAPSULE | Refills: 11 | COMMUNITY
Start: 2022-06-07 | End: 2022-06-16

## 2022-06-07 NOTE — TELEPHONE ENCOUNTER
Jake Sidhu MD Buboltz, Brittany J RN 16 hours ago (5:17 PM)   RS    Can decrease mycophenolate mofetil down to 250 mg every 12 hours for one week.      OUTCOME:  Called back Cricket Chen.  Discussed Dr. Sidhu's recommendations to decrease MMF to 250 mg BID (baseline dose 500 mg BID)  Verbalized understanding and agreement to plan.  Reports he has received the monoclonal antibody infusion.

## 2022-06-08 ENCOUNTER — HOME INFUSION (PRE-WILLOW HOME INFUSION) (OUTPATIENT)
Dept: PHARMACY | Facility: CLINIC | Age: 42
End: 2022-06-08
Payer: COMMERCIAL

## 2022-06-16 ENCOUNTER — TELEPHONE (OUTPATIENT)
Dept: TRANSPLANT | Facility: CLINIC | Age: 42
End: 2022-06-16

## 2022-06-16 ENCOUNTER — TELEPHONE (OUTPATIENT)
Dept: TRANSPLANT | Facility: CLINIC | Age: 42
End: 2022-06-16
Payer: COMMERCIAL

## 2022-06-16 DIAGNOSIS — Z94.0 KIDNEY TRANSPLANT RECIPIENT: ICD-10-CM

## 2022-06-16 DIAGNOSIS — Z79.60 LONG-TERM USE OF IMMUNOSUPPRESSANT MEDICATION: ICD-10-CM

## 2022-06-16 RX ORDER — MYCOPHENOLATE MOFETIL 250 MG/1
500 CAPSULE ORAL 2 TIMES DAILY
Qty: 120 CAPSULE | Refills: 11 | Status: SHIPPED | OUTPATIENT
Start: 2022-06-16 | End: 2023-06-28

## 2022-06-16 NOTE — TELEPHONE ENCOUNTER
Contacted pt for COVID symptom update.     Left message to call back and let us know if his symptoms are resolved and confirm he is back on MMF 500mg BID.     Afsaneh Spring RN on 6/16/2022 at 2:05 PM

## 2022-06-16 NOTE — TELEPHONE ENCOUNTER
----- Message from Nenita Bronson RN sent at 6/7/2022  9:48 AM CDT -----  Regarding: COVID-19 --- reassess  6/7 - decreased MMF to 250 mg BID (baseline 500 mg BID)  Please reassess in 1 week if sx not resolved

## 2022-06-16 NOTE — TELEPHONE ENCOUNTER
Patient Call: General  Route to LPN    Reason for call: patient tested positive for covid last week and had infusion done. Medication dosage was reduced and they are looking for guidance on what to do now and if it should be increased again    Call back needed? Yes    Return Call Needed  Same as documented in contacts section  When to return call?: Greater than one day: Route standard priority

## 2022-06-16 NOTE — TELEPHONE ENCOUNTER
Spoke with Cricket. Pt was feeling better 2 days after infusion. Pt remains with cough. No fevers.     Pt has been doing yard work   Pt will go back to usual cellcept dose 500 mg BID

## 2022-06-17 NOTE — PROGRESS NOTES
This is a recent snapshot of the patient's [facility] medical record.  For current drug dose and complete information and questions, call [phone] or In Basket pool, [facility]v home infusion ([id number])  CSN Number:  [id number]

## 2022-06-17 NOTE — PROGRESS NOTES
This is a recent snapshot of the patient's [facility] medical record.  For current drug dose and complete information and questions, call [phone] or In Basket pool, fv home infusion ([id number])  CSN Number:  [id number]

## 2022-06-21 DIAGNOSIS — Z94.4 LIVER REPLACED BY TRANSPLANT (H): Primary | ICD-10-CM

## 2022-07-05 ENCOUNTER — LAB (OUTPATIENT)
Dept: LAB | Facility: CLINIC | Age: 42
End: 2022-07-05
Payer: COMMERCIAL

## 2022-07-05 DIAGNOSIS — Z94.4 LIVER REPLACED BY TRANSPLANT (H): ICD-10-CM

## 2022-07-05 LAB
ALBUMIN SERPL-MCNC: 4.6 G/DL (ref 3.4–5)
ALP SERPL-CCNC: 68 U/L (ref 40–150)
ALT SERPL W P-5'-P-CCNC: 47 U/L (ref 0–70)
ANION GAP SERPL CALCULATED.3IONS-SCNC: 6 MMOL/L (ref 3–14)
AST SERPL W P-5'-P-CCNC: 27 U/L (ref 0–45)
BILIRUB DIRECT SERPL-MCNC: 0.2 MG/DL (ref 0–0.2)
BILIRUB SERPL-MCNC: 0.9 MG/DL (ref 0.2–1.3)
BUN SERPL-MCNC: 17 MG/DL (ref 7–30)
CALCIUM SERPL-MCNC: 10.7 MG/DL (ref 8.5–10.1)
CHLORIDE BLD-SCNC: 106 MMOL/L (ref 94–109)
CO2 SERPL-SCNC: 26 MMOL/L (ref 20–32)
CREAT SERPL-MCNC: 1.54 MG/DL (ref 0.66–1.25)
ERYTHROCYTE [DISTWIDTH] IN BLOOD BY AUTOMATED COUNT: 13.1 % (ref 10–15)
GFR SERPL CREATININE-BSD FRML MDRD: 58 ML/MIN/1.73M2
GLUCOSE BLD-MCNC: 110 MG/DL (ref 70–99)
HCT VFR BLD AUTO: 49.8 % (ref 40–53)
HGB BLD-MCNC: 16.9 G/DL (ref 13.3–17.7)
MAGNESIUM SERPL-MCNC: 2.2 MG/DL (ref 1.6–2.3)
MCH RBC QN AUTO: 30.3 PG (ref 26.5–33)
MCHC RBC AUTO-ENTMCNC: 33.9 G/DL (ref 31.5–36.5)
MCV RBC AUTO: 89 FL (ref 78–100)
PLATELET # BLD AUTO: 154 10E3/UL (ref 150–450)
POTASSIUM BLD-SCNC: 4.6 MMOL/L (ref 3.4–5.3)
PROT SERPL-MCNC: 7.9 G/DL (ref 6.8–8.8)
RBC # BLD AUTO: 5.57 10E6/UL (ref 4.4–5.9)
SODIUM SERPL-SCNC: 138 MMOL/L (ref 133–144)
TACROLIMUS BLD-MCNC: 5.8 UG/L (ref 5–15)
TME LAST DOSE: NORMAL H
TME LAST DOSE: NORMAL H
WBC # BLD AUTO: 5.6 10E3/UL (ref 4–11)

## 2022-07-05 PROCEDURE — 82248 BILIRUBIN DIRECT: CPT

## 2022-07-05 PROCEDURE — 80053 COMPREHEN METABOLIC PANEL: CPT

## 2022-07-05 PROCEDURE — 85027 COMPLETE CBC AUTOMATED: CPT

## 2022-07-05 PROCEDURE — 36415 COLL VENOUS BLD VENIPUNCTURE: CPT

## 2022-07-05 PROCEDURE — 83735 ASSAY OF MAGNESIUM: CPT

## 2022-07-05 PROCEDURE — 80197 ASSAY OF TACROLIMUS: CPT

## 2022-07-07 ENCOUNTER — TELEPHONE (OUTPATIENT)
Dept: CARDIOLOGY | Facility: CLINIC | Age: 42
End: 2022-07-07

## 2022-08-07 NOTE — PROGRESS NOTES
[facility]    Assessment/Recommendations   Assessment/Plan:    Mr. Chen is a 41 year old male who has a past medical history significant for dTGA s/p modified Shoemaker operation and stitch closure of small VSD , AFL s/p DCCV and s/p ablation 2000, liver and kidney transplant , post transplant lymphoproliferative disorder, papillary thyroid cancer s/p thyroidectomy, prior ETOH abuse (sober since ), anxiety, and depression. He presents today for follow up.     Atrial Flutter:   We discussed in detail with the patient management/treatment options for AFL includin. Stroke Prophylaxis:  CHADSVASC=+HF, +HTN  2, corresponding to a 2.2% annual stroke / systemic emolism event rate. indicating need for long term oral anticoagulation. He also has congenital anatomy which we would recommend anticoagulation. He is appropriately on Eliquis. No bleeding isues.    2. Rate Control: Continue Metoprolol XL 25 mg daily.   3. Rhythm Control: Cardioversion, Antiarrhythmics and/or ablation are options for rhythm control. He has previously been on Sotalol. Not currently on AATs. S/p DCCV 3/7/18  with history of Ablation at OSH in . Organized A.flutter is highly amenable to ablation procedure. An ablation can be considered for any recurrence.   4. Risk Factor Management: maintain tight BP control, and JUAN evaluation.       dTGA systemic EF 27%, NYHA I-II:  1. ACEi/ARB: Continue Lisiopril.  2. BB: Continue Metoprolol XL 25 mg daily.   3. Aldosterone antagonist: not required.  4. SCD prophylaxis: discussed that systemic EF read 27% recently, but relatively unchanged visually from prior imaging. We discussed that ICD can be considered, but there is limited data for congential patients.We are still deferring the ICD for now..   5. Fluid status: euvolemic on exam     Follow up in about 1 yearr.     Jaylen George MD [facility]  Cardiology - Electrophysiology       History of Present  Illness/Subjective    Mr. Cricket Chen is a 41 year old male who comes in today for EP follow-up of [facility] dTGA and AFL.    Mr. Chen is a 41 year old male who has a past medical history significant for dTGA s/p modified Shoemaker operation and stitch closure of small VSD 1981, AFL s/p DCCV and s/p ablation 8/2000, liver and kidney transplant 2016, post transplant lymphoproliferative disorder, papillary thyroid cancer s/p thyroidectomy, prior ETOH abuse (sober since 2014), anxiety, and depression. He presents today for follow up.      He presented to [facility] on 3/6/18 with shortness of breath and chest flutterings that started the day prior. He recalled it felt similar to when he was in AFL in the past. In the U, he was found to be in AFL. He was subsequently admitted. He was started on Eliquis and arranged for NEELIMA/DCCV. He had successful DCCV 3/7/18 and he was discharged. He states that he had AFL in the past. He believes he had a DCCV around age 7. He had also been maintained on Sotalol. He also reports a previous ablation at an OSH in 8/2000 (records not available to us at this time). He did well without AFL issues until 3/2018 when he had recurrence. Most recent echo from 3/30/18 showed systemic EF 35-40%, mild TR, and no evidence of baffle leaks/obstructions. A CMRI from OSH from 3/2017 showed no baffle  Obstruction and systemic EF 33%. He saw [facility] EP clinic on 4/13/2018. He was doing well. He had not noted any further arrhythmias since his DCCV. He followed up in EP clinic in 7/2018 and was doing well without recurrent AFL.      EP Visit 3/2019: He presents today for follow up. He presented to [facility] on 1/16/19 reporting some shortness of breath and palpitations. He was in sinus on presentation to ER. He was discharged on a zio patch monitor. Zio patch from 1/17/19-1/31/19 showed SR with rare ectopy and 7 NSVT up to 10 beats. 3 symptom activations showed sinus. An echo from 1/2019 showed moderately depressed  Rv function, no obvious baffle obstructions or leaks. He reports feeling well and no recurrent palpitations. He denies any chest pain/pressures, dizziness, lightheadedness, worsening shortness of breath, leg/ankle swelling, PND, orthopnea, palpitations, or syncopal symptoms. Presenting 12 lead ECG shows SB Vent Rate 59 bpm,  ms,  ms, QTc 429 ms. Current cardiac medications include: Toprol XL, Eliquis, Lisinopril, and Norvasc.      EP Visit 3/13/20: He presents today for follow up. He reports feeling well. He denies any chest pain/pressures, dizziness, lightheadedness, worsening shortness of breath, leg/ankle swelling, PND, orthopnea, palpitations, or syncopal symptoms. Recent CMRI/MRA showed systemic EF 37%, normal baffles without obstruction, fatty metaplasia of sub-pulmonic ventricle and narrowing of intra-hepatic IVC which are unchanged. A zio patch from June 2019 showed SR with 7 NSVT episodes. Presenting 12 lead ECG shows NSR IRBBB Vent Rate 65 bpm,  ms,  ms, QTc 443 ms. Current cardiac medications include: Toprol XL, Eliquis, Lisinopril, and Norvasc.     He presents today for follow up. He reports feeling very well.  He is stable with no new symptoms, NYHA class II.  Echo today was reviewed and shows mod to sev systemic ventricle dysfunction but also considered no change from last echoA zio patch monitor from 8/16/21-8/30/21 showed 11 NSVT up to 10 beats and rare isolated ectopy with adequate HR distribution in sinus. Current cardiac medications include: Toprol XL, Eliquis, Lisinopril, and Norvasc.     I have reviewed and updated the patient's Past Medical History, Social History, Family History and Medication List.     Cardiographics (Personally Reviewed) :   TTE 11/23/2021:  Patient after atrial switch operation for complete transposition of the great  arteries. Technically difficult study due to poor acoustic windows. Moderate  right ventricular dilatation and hypertrophy with  moderate to severely  decreased systolic function (no significant change compared to last  echocardiogram). Mild tricuspid valve insufficiency. The pulmonary venous  baffle is unobstructed; the systmic venous baffle was not well seen.  Qualitatively, normal left ventricular systolic function. Mild pulmonary valve  insufficiency.    3/30/18 ECHOCARDIOGRAM:   Patient after atrial switch operation for complete transposition of the great  arteries. Technically difficult study due to poor acoustic windows. No baffle  obstruction or leaks seen. There is moderate right ventricular enlargement.  Single plane right ventricular EF 35-40 %. Normal left ventricular systolic  function. No outflow obstruction. Mild tricuspid regurgitation.     3/2017 CMRI (OSH REPORT):  1. Transposition of the great arteries with native atrioventricular   concordance and ventricular arterial discordance with the aorta anterior   to the pulmonary artery (D-transposition of the great arteries).  2. Status post interatrial baffle with the superior vena cava and inferior   vena cava baffled to the left (subpulmonic) ventricle without obstruction.    There is no evidence of a baffle leak.    3. Systemic right ventricle:  a. The right ventricle is hypertrophied.  b. Decreased systolic function with an ejection fraction of 33%.  c. Severe dilation of the right ventricle with an end-diastolic volume of   208 mL/m  (previously 188 mL/m ) and end-systolic volume of 140 mL/m    (previous 123 mL/m ).  4. The right ventricle connects with the anterior aorta.  The coronary   artery origins are usual for transposition.  Left-sided aortic arch with   measurements above.  5. The pulmonary venous baffle to the right ventricle is widely patent.  6. The subpulmonary left ventricle has normal systolic function with   decreased myocardial mass.  The left ventricle connects to the pulmonary   artery, which is posterior to the aorta.  7. No significant change from  previous cardiac MRI of 9/4/2012 except an   increased size in the indexed right ventricular diastolic and systolic   Volumes.  3/4/2020 CMRIMRA:  CONCLUSIONS: d-TGA with atrial baffle repair.  The systemic ventricle is moderately enlarged with severely  reduced function, EF 27%. The subpulmonic ventricle is small in size with normal function, EF 57%. Normal  baffles without any obstruction, thrombus or leakage. There is mild-moderate regurgitation of the systemic  atrioventricular (tricuspid) valve. There is no residual intracardiac shunting (Qp/Qs 1.0).   The subpulmonic ventricle exhibits extensive fatty metaplasia in the mid and apical segments of unclear  significance. Narrowing of intra-hepatic IVC of unclear significance.   Compared to the prior examination dated 03/09/2017 the systemic ventricle's function appears similar  visually. The fatty metaplasia of sub-pulmonic ventricle and narrowing of intra-hepatic IVC  is unchanged  from prior examinations.   1/2019 ECHO:  ------CONCLUSIONS------  Patient after atrial switch operation for complete transposition of the great  arteries. Technically difficult study due to poor acoustic windows. There is  moderate right ventricular enlargement. The right ventricular function is  qualitatively moderately depressed. Qualitivley the right ventricle function  is unchanged form the 3/30/2018 echo. Mild (1+) tricuspid valve insufficiency.  No obvious baffle obstruction or leaks seen. Qualitatively normal left  ventricular systolic function. Mild (1+) pulmonary valve insufficiency.  Limited visualization of the LPA suggests narrowing. RPA not seen.       Physical Examination   There were no vitals taken for this visit.  Wt Readings from Last 3 Encounters:   03/11/21 96.8 kg (213 lb 6.4 oz)   10/07/20 88.2 kg (194 lb 8 oz)   08/18/20 97.5 kg (215 lb)     General Appearance:   Alert, well-appearing and in no acute distress.   HEENT: Atraumatic, normocephalic. PERRL.  MMM.    Chest/Lungs:   Respirations unlabored.  Lungs are clear to auscultation.   Cardiovascular:   Regular rate and rhythm.    Abdomen:  Soft, nontender, nondistended.   Extremities: No cyanosis or clubbing. No edema.    Musculoskeletal: Moves all extremities.  Gait normal.   Skin: Warm, dry, intact.    Neurologic: Mood and affect are appropriate.  Alert and oriented to person, place, time, and situation.          Medications  Allergies   Current Outpatient Medications   Medication Sig Dispense Refill     apixaban ANTICOAGULANT (ELIQUIS ANTICOAGULANT) 5 MG tablet Take 1 tablet (5 mg) by mouth 2 times daily 180 tablet 0     ketoconazole (NIZORAL) 2 % cream Twice daily to areas of rash on face 60 g 1     levothyroxine (SYNTHROID/LEVOTHROID) 137 MCG tablet Take 1 tablet (137 mcg) by mouth daily 90 tablet 3     lisinopril (ZESTRIL) 10 MG tablet TAKE ONE TABLET BY MOUTH ONCE DAILY 90 tablet 1     LORazepam (ATIVAN) 1 MG tablet Take 1 tablet (1 mg) by mouth daily as needed (severe anxiety/panic/sleep) 15 tablet 0     magnesium oxide (MAG-OX) 400 MG tablet TAKE ONE TABLET BY MOUTH TWICE A  tablet 11     metoprolol succinate ER (TOPROL-XL) 25 MG 24 hr tablet Take 1 tablet (25 mg) by mouth 2 times daily Next refill is at 11- appointment 180 tablet 0     mycophenolate (GENERIC EQUIVALENT) 250 MG capsule Take 2 capsules (500 mg) by mouth 2 times daily 120 capsule 11     sulfamethoxazole-trimethoprim (BACTRIM) 400-80 MG tablet Take 1 tablet by mouth daily 30 tablet 11     tacrolimus (GENERIC EQUIVALENT) 0.5 MG capsule TAKE ONE CAPSULE BY MOUTH EVERY MORNING (TOTAL DOSE 1.5MG EVERY MORNING AND 1MG EVERY EVENING) 90 capsule 3     tacrolimus (GENERIC EQUIVALENT) 1 MG capsule TAKE ONE CAPSULE BY MOUTH EVERY 12 HOURS (TOTAL DOSE 1.5MG EVERY MORNING AND 1MG EVERY EVENING 180 capsule 3     traZODone (DESYREL) 50 MG tablet Take 1 tablet (50 mg) by mouth at bedtime as needed, may repeat once for sleep 180 tablet 0    Allergies    Allergen Reactions     Hydromorphone Itching         Lab Results (Personally Reviewed)    Chemistry/lipid CBC Cardiac Enzymes/BNP/TSH/INR   Lab Results   Component Value Date    BUN 11 11/02/2021     11/02/2021    CO2 27 11/02/2021     Creatinine   Date Value Ref Range Status   11/02/2021 1.27 (H) 0.66 - 1.25 mg/dL Final   07/06/2021 1.27 (H) 0.66 - 1.25 mg/dL Final       Lab Results   Component Value Date    CHOL 207 (H) 07/06/2021    HDL 38 (L) 07/06/2021     (H) 07/06/2021      Lab Results   Component Value Date    WBC 5.3 11/02/2021    HGB 15.9 11/02/2021    HCT 46.6 11/02/2021    MCV 90 11/02/2021     (L) 11/02/2021    Lab Results   Component Value Date    TSH 0.16 (L) 05/14/2021    INR 1.13 10/30/2019        The patient states understanding and is agreeable with the plan.   Jaylen George MD [facility]  [facility] - Electrophysiology       -pt p/w acute left hip fracture. Initially pt refusing surgery; then agreeable and spoke with mother. IMN on 7/17  - c/w pain control  - c/w bowel regimen  - incentive spirometer at bedside and patient endorses using regularly  - dvt ppx - lovenox  - R knee scrape evaluated by wound care and currently dressed; wound care recs appreciated and followed   - PT, OT and PMNR suggesting home as of 7/31  - will send with aspirin 325 BID for 4-6 weeks on dc  - OP follow up w Dr. Stafford (orthopedics) in office 1-2 weeks after discharge; RIP emailed for followup appt.

## 2022-08-25 DIAGNOSIS — E89.0 POSTOPERATIVE HYPOTHYROIDISM: ICD-10-CM

## 2022-08-25 DIAGNOSIS — C73 PAPILLARY THYROID CARCINOMA (H): ICD-10-CM

## 2022-08-25 RX ORDER — LEVOTHYROXINE SODIUM 137 UG/1
137 TABLET ORAL DAILY
Qty: 90 TABLET | Refills: 0 | Status: SHIPPED | OUTPATIENT
Start: 2022-08-25 | End: 2022-10-14

## 2022-09-06 ENCOUNTER — LAB (OUTPATIENT)
Dept: LAB | Facility: CLINIC | Age: 42
End: 2022-09-06
Payer: COMMERCIAL

## 2022-09-06 DIAGNOSIS — Z94.4 LIVER REPLACED BY TRANSPLANT (H): ICD-10-CM

## 2022-09-06 LAB
ALBUMIN SERPL-MCNC: 4 G/DL (ref 3.4–5)
ALP SERPL-CCNC: 74 U/L (ref 40–150)
ALT SERPL W P-5'-P-CCNC: 33 U/L (ref 0–70)
ANION GAP SERPL CALCULATED.3IONS-SCNC: 7 MMOL/L (ref 3–14)
AST SERPL W P-5'-P-CCNC: 23 U/L (ref 0–45)
BILIRUB DIRECT SERPL-MCNC: 0.1 MG/DL (ref 0–0.2)
BILIRUB SERPL-MCNC: 0.5 MG/DL (ref 0.2–1.3)
BUN SERPL-MCNC: 17 MG/DL (ref 7–30)
CALCIUM SERPL-MCNC: 9.3 MG/DL (ref 8.5–10.1)
CHLORIDE BLD-SCNC: 108 MMOL/L (ref 94–109)
CO2 SERPL-SCNC: 25 MMOL/L (ref 20–32)
CREAT SERPL-MCNC: 1.43 MG/DL (ref 0.66–1.25)
ERYTHROCYTE [DISTWIDTH] IN BLOOD BY AUTOMATED COUNT: 13.3 % (ref 10–15)
GFR SERPL CREATININE-BSD FRML MDRD: 63 ML/MIN/1.73M2
GLUCOSE BLD-MCNC: 122 MG/DL (ref 70–99)
HCT VFR BLD AUTO: 45.7 % (ref 40–53)
HGB BLD-MCNC: 15.4 G/DL (ref 13.3–17.7)
MAGNESIUM SERPL-MCNC: 2.1 MG/DL (ref 1.6–2.3)
MCH RBC QN AUTO: 30.9 PG (ref 26.5–33)
MCHC RBC AUTO-ENTMCNC: 33.7 G/DL (ref 31.5–36.5)
MCV RBC AUTO: 92 FL (ref 78–100)
PLATELET # BLD AUTO: 160 10E3/UL (ref 150–450)
POTASSIUM BLD-SCNC: 4.7 MMOL/L (ref 3.4–5.3)
PROT SERPL-MCNC: 7 G/DL (ref 6.8–8.8)
RBC # BLD AUTO: 4.99 10E6/UL (ref 4.4–5.9)
SODIUM SERPL-SCNC: 140 MMOL/L (ref 133–144)
TACROLIMUS BLD-MCNC: 5.2 UG/L (ref 5–15)
TME LAST DOSE: NORMAL H
TME LAST DOSE: NORMAL H
WBC # BLD AUTO: 4.3 10E3/UL (ref 4–11)

## 2022-09-06 PROCEDURE — 85027 COMPLETE CBC AUTOMATED: CPT

## 2022-09-06 PROCEDURE — 80197 ASSAY OF TACROLIMUS: CPT

## 2022-09-06 PROCEDURE — 80053 COMPREHEN METABOLIC PANEL: CPT

## 2022-09-06 PROCEDURE — 82248 BILIRUBIN DIRECT: CPT

## 2022-09-06 PROCEDURE — 36415 COLL VENOUS BLD VENIPUNCTURE: CPT

## 2022-09-06 PROCEDURE — 83735 ASSAY OF MAGNESIUM: CPT

## 2022-09-08 DIAGNOSIS — G47.00 INSOMNIA, UNSPECIFIED TYPE: ICD-10-CM

## 2022-09-08 DIAGNOSIS — F41.0 PANIC DISORDER WITHOUT AGORAPHOBIA: ICD-10-CM

## 2022-09-09 RX ORDER — TRAZODONE HYDROCHLORIDE 50 MG/1
50 TABLET, FILM COATED ORAL
OUTPATIENT
Start: 2022-09-09

## 2022-09-09 RX ORDER — LORAZEPAM 1 MG/1
1 TABLET ORAL DAILY PRN
OUTPATIENT
Start: 2022-09-09

## 2022-09-09 NOTE — TELEPHONE ENCOUNTER
Routing refill request to provider for review/approval because:  Failed Protocol    Laureen Ray RN BSN  [facility]

## 2022-10-03 ENCOUNTER — OFFICE VISIT (OUTPATIENT)
Dept: FAMILY MEDICINE | Facility: CLINIC | Age: 42
End: 2022-10-03
Payer: COMMERCIAL

## 2022-10-03 VITALS
OXYGEN SATURATION: 97 % | WEIGHT: 192.2 LBS | BODY MASS INDEX: 27.52 KG/M2 | HEIGHT: 70 IN | HEART RATE: 81 BPM | DIASTOLIC BLOOD PRESSURE: 74 MMHG | SYSTOLIC BLOOD PRESSURE: 116 MMHG | TEMPERATURE: 97.9 F

## 2022-10-03 DIAGNOSIS — G47.00 INSOMNIA, UNSPECIFIED TYPE: ICD-10-CM

## 2022-10-03 DIAGNOSIS — N18.31 STAGE 3A CHRONIC KIDNEY DISEASE (H): ICD-10-CM

## 2022-10-03 DIAGNOSIS — C73 PAPILLARY THYROID CARCINOMA (H): ICD-10-CM

## 2022-10-03 DIAGNOSIS — Z94.0 KIDNEY REPLACED BY TRANSPLANT: ICD-10-CM

## 2022-10-03 DIAGNOSIS — F41.0 PANIC DISORDER WITHOUT AGORAPHOBIA: ICD-10-CM

## 2022-10-03 DIAGNOSIS — D84.9 IMMUNOSUPPRESSION (H): ICD-10-CM

## 2022-10-03 DIAGNOSIS — Z87.74 HISTORY OF TRANSPOSITION OF GREAT VESSELS: ICD-10-CM

## 2022-10-03 DIAGNOSIS — Z76.89 ENCOUNTER TO ESTABLISH CARE: Primary | ICD-10-CM

## 2022-10-03 DIAGNOSIS — Z94.4 LIVER REPLACED BY TRANSPLANT (H): ICD-10-CM

## 2022-10-03 DIAGNOSIS — Z13.220 SCREENING FOR HYPERLIPIDEMIA: ICD-10-CM

## 2022-10-03 PROBLEM — R07.9 CHEST PAIN: Status: RESOLVED | Noted: 2019-01-17 | Resolved: 2022-10-03

## 2022-10-03 PROCEDURE — 90686 IIV4 VACC NO PRSV 0.5 ML IM: CPT | Performed by: PHYSICIAN ASSISTANT

## 2022-10-03 PROCEDURE — G0008 ADMIN INFLUENZA VIRUS VAC: HCPCS | Performed by: PHYSICIAN ASSISTANT

## 2022-10-03 PROCEDURE — 99214 OFFICE O/P EST MOD 30 MIN: CPT | Mod: 25 | Performed by: PHYSICIAN ASSISTANT

## 2022-10-03 RX ORDER — TRAZODONE HYDROCHLORIDE 50 MG/1
50 TABLET, FILM COATED ORAL
Qty: 180 TABLET | Refills: 0 | Status: SHIPPED | OUTPATIENT
Start: 2022-10-03 | End: 2023-05-01

## 2022-10-03 RX ORDER — LORAZEPAM 1 MG/1
1 TABLET ORAL DAILY PRN
Qty: 15 TABLET | Refills: 0 | Status: SHIPPED | OUTPATIENT
Start: 2022-10-03 | End: 2023-02-09

## 2022-10-03 ASSESSMENT — ANXIETY QUESTIONNAIRES
5. BEING SO RESTLESS THAT IT IS HARD TO SIT STILL: NOT AT ALL
GAD7 TOTAL SCORE: 2
GAD7 TOTAL SCORE: 2
3. WORRYING TOO MUCH ABOUT DIFFERENT THINGS: SEVERAL DAYS
6. BECOMING EASILY ANNOYED OR IRRITABLE: NOT AT ALL
1. FEELING NERVOUS, ANXIOUS, OR ON EDGE: SEVERAL DAYS
7. FEELING AFRAID AS IF SOMETHING AWFUL MIGHT HAPPEN: NOT AT ALL
IF YOU CHECKED OFF ANY PROBLEMS ON THIS QUESTIONNAIRE, HOW DIFFICULT HAVE THESE PROBLEMS MADE IT FOR YOU TO DO YOUR WORK, TAKE CARE OF THINGS AT HOME, OR GET ALONG WITH OTHER PEOPLE: NOT DIFFICULT AT ALL
2. NOT BEING ABLE TO STOP OR CONTROL WORRYING: NOT AT ALL

## 2022-10-03 ASSESSMENT — PATIENT HEALTH QUESTIONNAIRE - PHQ9
SUM OF ALL RESPONSES TO PHQ QUESTIONS 1-9: 2
5. POOR APPETITE OR OVEREATING: NOT AT ALL

## 2022-10-03 NOTE — PROGRESS NOTES
"  Assessment & Plan     Encounter to establish care    Panic disorder without agoraphobia  Refilled. Uses about #15 per year. Discussed need for further discussion if needing refills earlier.  - LORazepam (ATIVAN) 1 MG tablet; Take 1 tablet (1 mg) by mouth daily as needed (severe anxiety/panic/sleep)    Insomnia, unspecified type  Refilled. Doing well with this as needed.  - traZODone (DESYREL) 50 MG tablet; Take 1 tablet (50 mg) by mouth at bedtime as needed, may repeat once for sleep    Papillary thyroid carcinoma (H)  Sees endocrinology.    Stage 3a chronic kidney disease (H)  Last GFR > 60.   - Albumin Random Urine Quantitative with Creat Ratio; Future    Liver replaced by transplant (H)  Kidney replaced by transplant  Immunosuppression (H)  Sees transplant team. Has labs every other month.    History of transposition of great vessels  Sees cardiology yearly.    Screening for hyperlipidemia  Screen.   - Lipid panel reflex to direct LDL Non-fasting; Future             BMI:   Estimated body mass index is 27.58 kg/m  as calculated from the following:    Height as of this encounter: 1.778 m (5' 10\").    Weight as of this encounter: 87.2 kg (192 lb 3.2 oz).   Weight management plan: Discussed healthy diet and exercise guidelines        Return in about 6 months (around 4/3/2023) for Routine preventive.    Seble Reilly PA-C  [facility] ANTHONY Harley is a 42 year old, presenting for the following health issues:  Establish Care (Med refills)      History of Present Illness       Reason for visit:  Establish new care and take over prescriptions    He eats 2-3 servings of fruits and vegetables daily.He consumes 0 sweetened beverage(s) daily.He exercises with enough effort to increase his heart rate 20 to 29 minutes per day.  He exercises with enough effort to increase his heart rate 3 or less days per week.   He is taking medications regularly.     Lorazepam - takes very infrequently. " "Last filled July 2021. Used #15 in about a year for significant panic attack, anxiety.  Trazodone - takes about 1 a week when unable to sleep.     Feeling ok. No issues.   Drivers .     No kids. Not .           Review of Systems   Constitutional, HEENT, cardiovascular, pulmonary, gi and gu systems are negative, except as otherwise noted.      Objective    /74 (BP Location: Right arm, Patient Position: Sitting, Cuff Size: Adult Regular)   Pulse 81   Temp 97.9  F (36.6  C) (Oral)   Ht 1.778 m (5' 10\")   Wt 87.2 kg (192 lb 3.2 oz)   SpO2 97%   BMI 27.58 kg/m    Body mass index is 27.58 kg/m .  Physical Exam   GENERAL: healthy, alert and no distress  NECK: no adenopathy, no asymmetry, masses, or scars and thyroid normal to palpation  RESP: lungs clear to auscultation - no rales, rhonchi or wheezes  CV: regular rate and rhythm, normal S1 S2, no S3 or S4, no murmur, click or rub, no peripheral edema and peripheral pulses strong  ABDOMEN: soft, nontender, no hepatosplenomegaly, no masses and bowel sounds normal  MS: no gross musculoskeletal defects noted, no edema                    "

## 2022-10-12 ENCOUNTER — VIRTUAL VISIT (OUTPATIENT)
Dept: NEPHROLOGY | Facility: CLINIC | Age: 42
End: 2022-10-12
Attending: INTERNAL MEDICINE
Payer: COMMERCIAL

## 2022-10-12 DIAGNOSIS — Z94.0 KIDNEY TRANSPLANT RECIPIENT: ICD-10-CM

## 2022-10-12 DIAGNOSIS — Z94.0 HTN, KIDNEY TRANSPLANT RELATED: ICD-10-CM

## 2022-10-12 DIAGNOSIS — Z94.0 KIDNEY REPLACED BY TRANSPLANT: Primary | ICD-10-CM

## 2022-10-12 DIAGNOSIS — I15.1 HTN, KIDNEY TRANSPLANT RELATED: ICD-10-CM

## 2022-10-12 DIAGNOSIS — N18.2 CKD (CHRONIC KIDNEY DISEASE) STAGE 2, GFR 60-89 ML/MIN: ICD-10-CM

## 2022-10-12 DIAGNOSIS — Z94.4 LIVER REPLACED BY TRANSPLANT (H): ICD-10-CM

## 2022-10-12 DIAGNOSIS — E83.42 HYPOMAGNESEMIA: ICD-10-CM

## 2022-10-12 DIAGNOSIS — B27.00 EBV (EPSTEIN-BARR VIRUS) VIREMIA: ICD-10-CM

## 2022-10-12 DIAGNOSIS — D84.9 IMMUNOSUPPRESSION (H): ICD-10-CM

## 2022-10-12 DIAGNOSIS — Z48.298 AFTERCARE FOLLOWING ORGAN TRANSPLANT: ICD-10-CM

## 2022-10-12 PROCEDURE — 99214 OFFICE O/P EST MOD 30 MIN: CPT | Mod: 95 | Performed by: INTERNAL MEDICINE

## 2022-10-12 PROCEDURE — G0463 HOSPITAL OUTPT CLINIC VISIT: HCPCS | Mod: PN,RTG | Performed by: INTERNAL MEDICINE

## 2022-10-12 NOTE — NURSING NOTE
Patient declined individual  medication review by support staff because he just reviewed them.  No vitals were taken at home today  Julia Conway VF

## 2022-10-12 NOTE — LETTER
10/12/2022       RE: Cricket Chen  [address]  [facility] [address]     Dear Colleague,    Thank you for referring your patient, Cricket Chen, to the [facility] [place] at [facility]. Please see a copy of my visit note below.    TRANSPLANT NEPHROLOGY CHRONIC POST TRANSPLANT VISIT    Assessment & Plan   # DDKT (SLK): Stable   - Baseline Creatinine:  ~ 1.3-1.6   - Proteinuria: Normal (<0.2 grams)   - Date DSA Last Checked: Oct/2020      Latest DSA: No   - BK Viremia: No   - Kidney Tx Biopsy: Oct 30, 2019; Result: No diagnostic evidence of acute rejection.  Some i-IFTA with inflamed area, but otherwise minimal interstitial fibrosis or tubular atrophy.             Sep 26, 2018; Result: No diagnostic evidence of acute rejection.  Mild arteriosclerosis.    # Liver Tx (SLK): Appears to be stable with normal LFTs.  Follows closely with Hepatology.    # Immunosuppression: Tacrolimus immediate release (goal 3-5), Mycophenolate mofetil (dose 500 mg every 12 hours) and Prednisone (dose 5 mg daily)   - Continue with intensive monitoring of immunosuppression for efficacy and toxicity.   - Changes: No    # Infection Prophylaxis:   Last CD4 Level: Not checked  - PJP: Sulfa/TMP (Bactrim)    # Hypertension: Not checked recently;  Goal BP: < 130/80   - Changes: Not at this time; Recommend patient check blood pressure at home on a regular basis, such as once every week or two, and follow up with PCP if above goal.    # Elevated Blood Glucose: Glucose generally running ~ 110-130s   - Management as per primary care.    # Post-Transplant Erythrocytosis: Hgb: Stable;  On ACEI/ARB: Yes   Imaging: Yes - 6/2021 ultrasounds with no concerning lesions    # Mineral Bone Disorder:   - Vitamin D; level: Not checked recently        On supplement: No  - Calcium; level: Normal        On supplement: No    # Electrolytes:   - Potassium; level: Normal         On supplement: No  - Magnesium; level: Normal        On supplement: Yes  - Bicarbonate; level: Normal        On supplement: No    # H/o Transposition of Great Vessels, s/p Surgery: Appears to be doing well.  Last cardiac echo (congenital) 11/2021 with no apparent changes.  Follows closely with Cardiology.    # H/o Atrial Fibrillation/Flutter, s/p Ablation: No recent episodes or palpitations.    # PTLD: Diagnosed in 2017, s/p treatment with R-CEOP with no evidence of recurrence.  Patient remains on slightly lower immunosuppression.    # Papillary Thyroid Cancer, s/p Thyroidectomy: Patient is doing fine with no evidence of recurrence.  He is on levothyroxine following his thyroidectomy.    # EBV Viremia: Minimal EBV PCR at less than 1000 with last check 3/2022, likely of no clinical significance.  He is asymptomatic.   - No need to check EBV PCR unless clinically indicated.    # Anxiety/Panic Disorder: Stable and only rare use of Ativan.    # Skin Cancer Risk:    - Discussed sun protection and recommend regular follow up with Dermatology.    # Medical Compliance: Yes     # COVID-19 Virus Review: Discussed COVID-19 virus and the potential medical risks.  Reviewed preventative health recommendations, including wearing a mask where appropriate.  Recommended COVID vaccination should be up to date with either an initial vaccination or booster shot when appropriate.  Asked the patient to inform the transplant center if they are exposed or diagnosed with this virus.    # COVID Vaccination Up To Date: No, due for next dose    # Transplant History:  Etiology of Kidney Failure: Hepatorenal syndrome (HRS)  Tx: DDKT (K) and Liver Tx ([facility])  Transplant: 5/11/2016 (Kidney), 5/10/2016 (Liver)  Significant changes in immunosuppression: Decreased immunosuppression due to PTLD  Significant transplant-related complications: BK Viremia, EBV Viremia and Post-Transplant Lymphoproliferative Disorder (PTLD)    Transplant Office Phone  Number: [phone]    Assessment and plan was discussed with the patient and he voiced his understanding and agreement.    Return visit: Return in about 1 year (around 10/12/2023).    Jake Sidhu MD    Chief Complaint   Mr. Chen is a 42 year old here for kidney transplant and immunosuppression management.    History of Present Illness    Mr. Chen reports feeling good overall with some medical complaints.  Since last clinic visit, patient reports no hospitalizations or new medical complaints and has been doing well overall.  He did get a COVID infection in June with mild symptoms, mostly cough and fatigue for a couple of days.  Patient received monoclonal antibodies and did okay.    His energy level is good and remains normal.  He is active and does get some exercise.  Denies any chest pain or shortness of breath with exertion.  No palpitations.  No leg swelling.    Appetite is good, but he has been trying to eat healthier and has lost 10-15 lbs.  No nausea, vomiting or diarrhea.  No fever sweats or chills.  No night sweats.  His anxiety is stable with rare use of Ativan.    Home BP: Not checked    Problem List   Patient Active Problem List   Diagnosis     Atrial flutter (H)     Esophageal varices (H)     Insomnia     Alcoholic hepatitis     History of alcohol abuse     History of transposition of great vessels     Depression     Thrombocytopenia (H)     [facility]     Liver replaced by transplant (H)     Kidney replaced by transplant     Immunosuppression (H)     Hypomagnesemia     Secondary renal hyperparathyroidism (H)     Aftercare following organ transplant     Post-transplant lymphoproliferative disorder (H)     Papillary thyroid carcinoma (H)     Advance directive discussed with patient     HTN, kidney transplant related     Paroxysmal atrial fibrillation (H)     Postoperative hypothyroidism     Dermatitis, seborrheic      CKD (chronic kidney disease) stage 2, GFR 60-89 ml/min     EBV (Abelino-Barr virus) viremia       Allergies   Allergies   Allergen Reactions     Hydromorphone Itching       Medications   Current Outpatient Medications   Medication Sig     ELIQUIS ANTICOAGULANT 5 MG tablet TAKE ONE TABLET BY MOUTH TWICE A DAY     levothyroxine (SYNTHROID/LEVOTHROID) 137 MCG tablet Take 1 tablet (137 mcg) by mouth daily     lisinopril (ZESTRIL) 20 MG tablet Take 1 tablet (20 mg) by mouth daily     LORazepam (ATIVAN) 1 MG tablet Take 1 tablet (1 mg) by mouth daily as needed (severe anxiety/panic/sleep)     magnesium oxide (MAG-OX) 400 MG tablet Take 1 tablet (400 mg) by mouth 2 times daily     metoprolol succinate ER (TOPROL-XL) 25 MG 24 hr tablet Take 1 tablet (25 mg) by mouth 2 times daily     mycophenolate (GENERIC EQUIVALENT) 250 MG capsule Take 2 capsules (500 mg) by mouth 2 times daily     sulfamethoxazole-trimethoprim (BACTRIM) 400-80 MG tablet Take 1 tablet by mouth daily     tacrolimus (GENERIC EQUIVALENT) 0.5 MG capsule TAKE ONE CAPSULE BY MOUTH EVERY MORNING (TOTAL DOSE IS 1.5MG IN  THE MORNING AND 1MG IN THE EVENING)     tacrolimus (GENERIC EQUIVALENT) 1 MG capsule TAKE ONE CAPSULE BY MOUTH TWICE A DAY (TOTAL DOSE IS 1.5MG IN THE MORNING AND 1MG IN THE EVENING)     traZODone (DESYREL) 50 MG tablet Take 1 tablet (50 mg) by mouth at bedtime as needed, may repeat once for sleep     No current facility-administered medications for this visit.     There are no discontinued medications.    Physical Exam   Vital Signs: There were no vitals taken for this visit.    GENERAL APPEARANCE: alert and no distress  HENT: no obvious abnormalities on appearance  RESP: breathing appears unremarkable with normal rate, no audible wheezing or cough and no apparent shortness of breath with conversation  MS: extremities normal - no gross deformities noted  SKIN: no apparent rash and normal skin tone  NEURO: speech is clear with no obvious  neurological deficits  PSYCH: mentation appears normal and affect normal    Data     Renal Latest Ref Rng & Units 9/6/2022 7/5/2022 5/3/2022   Na 133 - 144 mmol/L 140 138 142   K 3.4 - 5.3 mmol/L 4.7 4.6 4.2   Cl 94 - 109 mmol/L 108 106 113(H)   CO2 20 - 32 mmol/L 25 26 20   BUN 7 - 30 mg/dL 17 17 14   Cr 0.66 - 1.25 mg/dL 1.43(H) 1.54(H) 1.34(H)   Glucose 70 - 99 mg/dL 122(H) 110(H) 135(H)   Ca  8.5 - 10.1 mg/dL 9.3 10.7(H) 9.5   Mg 1.6 - 2.3 mg/dL 2.1 2.2 2.0     Bone Health Latest Ref Rng & Units 10/2/2020 8/7/2017 8/7/2017   Phos 2.5 - 4.5 mg/dL 2.3(L) - -   PTHi 12 - 72 pg/mL - 167(H) 109(H)   Vit D Def 20 - 75 ug/L - - -     Heme Latest Ref Rng & Units 9/6/2022 7/5/2022 5/3/2022   WBC 4.0 - 11.0 10e3/uL 4.3 5.6 4.8   Hgb 13.3 - 17.7 g/dL 15.4 16.9 15.1   Plt 150 - 450 10e3/uL 160 154 140(L)   ABSOLUTE NEUTROPHIL 1.6 - 8.3 10e9/L - - -   ABSOLUTE LYMPHOCYTES 0.8 - 5.3 10e9/L - - -   ABSOLUTE MONOCYTES 0.0 - 1.3 10e9/L - - -   ABSOLUTE EOSINOPHILS 0.0 - 0.7 10e9/L - - -   ABSOLUTE BASOPHILS 0.0 - 0.2 10e9/L - - -   ABS IMMATURE GRANULOCYTES 0 - 0.4 10e9/L - - -   ABSOLUTE NUCLEATED RBC - - - -     Liver Latest Ref Rng & Units 9/6/2022 7/5/2022 5/3/2022   AP 40 - 150 U/L 74 68 61   TBili 0.2 - 1.3 mg/dL 0.5 0.9 0.4   DBili 0.0 - 0.2 mg/dL 0.1 0.2 <0.1   ALT 0 - 70 U/L 33 47 40   AST 0 - 45 U/L 23 27 21   Tot Protein 6.8 - 8.8 g/dL 7.0 7.9 7.0   Albumin 3.4 - 5.0 g/dL 4.0 4.6 4.1     Pancreas Latest Ref Rng & Units 3/6/2018 5/12/2016 5/10/2016   A1C 4.3 - 6.0 % - 5.1 -   Amylase 30 - 110 U/L - 47 100   Lipase 73 - 393 U/L 95 125 -     Iron studies Latest Ref Rng & Units 7/14/2016 6/21/2014   Iron 35 - 180 ug/dL 84 101   Iron sat 15 - 46 % 29 71(H)   Ferritin 26 - 388 ng/mL 171 -     [facility] Txp Virology Latest Ref Rng & Units 3/16/2022 6/2/2020 1/7/2020   CVM DNA Quant - - - -   CMV QUANT IU/ML CMVND:CMV DNA Not Detected [IU]/mL - - -   LOG IU/ML OF CMVQNT <2.1 [Log:IU]/mL - - -   BK Spec - - - -   BK Res BKNEG:BK  Virus DNA Not Detected copies/mL - - -   BK Log <2.7 Log copies/mL - - -   EBV CAPSID ANTIBODY IGG 0.0 - 0.8 AI - - -   EBV DNA COPIES/ML EBVNEG:EBV DNA Not Detected [Copies]/mL - EBV DNA Not Detected <500(A)   EBV DNA LOG OF COPIES - 2.8 Not Calculated <2.7   Hep B Core NR - - -        Recent Labs   Lab Test 05/03/22  1125 07/05/22  1114 09/06/22  1120   DOSTAC 5/2/2022 7/4/2022 9/5/2022   TACROL 3.6* 5.8 5.2     Recent Labs   Lab Test 05/19/16  0714   DOSMPA 1,900   MPACID <0.25*   MPAG 51.0           Sincerely,    Jake Sidhu MD

## 2022-10-12 NOTE — LETTER
10/12/2022      RE: Cricket ISAAC Erikacindinelli  [address]  [facility] [address]       Cricket is a 42 year old who is being evaluated via a billable video visit.      How would you like to obtain your AVS? MyChart  If the video visit is dropped, the invitation should be resent by: Send to e-mail at: [email or web address]  Will anyone else be joining your video visit? No    Video-Visit Details    Video Start Time: 1134    Type of service:  Video Visit    Video End Time:1146    Originating Location (pt. Location): Home    Distant Location (provider location):  [facility] [place]     Platform used for Video Visit: [facility]      TRANSPLANT NEPHROLOGY CHRONIC POST TRANSPLANT VISIT    Assessment & Plan   # DDKT (SLK): Stable   - Baseline Creatinine:  ~ 1.3-1.6   - Proteinuria: Normal (<0.2 grams)   - Date DSA Last Checked: Oct/2020      Latest DSA: No   - BK Viremia: No   - Kidney Tx Biopsy: Oct 30, 2019; Result: No diagnostic evidence of acute rejection.  Some i-IFTA with inflamed area, but otherwise minimal interstitial fibrosis or tubular atrophy.             Sep 26, 2018; Result: No diagnostic evidence of acute rejection.  Mild arteriosclerosis.    # Liver Tx (SLK): Appears to be stable with normal LFTs.  Follows closely with Hepatology.    # Immunosuppression: Tacrolimus immediate release (goal 3-5), Mycophenolate mofetil (dose 500 mg every 12 hours) and Prednisone (dose 5 mg daily)   - Continue with intensive monitoring of immunosuppression for efficacy and toxicity.   - Changes: No    # Infection Prophylaxis:   Last CD4 Level: Not checked  - PJP: Sulfa/TMP (Bactrim)    # Hypertension: Not checked recently;  Goal BP: < 130/80   - Changes: Not at this time; Recommend patient check blood pressure at home on a regular basis, such as once every week or two, and follow up with PCP if above goal.    # Elevated Blood Glucose: Glucose generally running ~ 110-130s   - Management as per primary  care.    # Post-Transplant Erythrocytosis: Hgb: Stable;  On ACEI/ARB: Yes   Imaging: Yes - 6/2021 ultrasounds with no concerning lesions    # Mineral Bone Disorder:   - Vitamin D; level: Not checked recently        On supplement: No  - Calcium; level: Normal        On supplement: No    # Electrolytes:   - Potassium; level: Normal        On supplement: No  - Magnesium; level: Normal        On supplement: Yes  - Bicarbonate; level: Normal        On supplement: No    # H/o Transposition of Great Vessels, s/p Surgery: Appears to be doing well.  Last cardiac echo (congenital) 11/2021 with no apparent changes.  Follows closely with Cardiology.    # H/o Atrial Fibrillation/Flutter, s/p Ablation: No recent episodes or palpitations.    # PTLD: Diagnosed in 2017, s/p treatment with R-CEOP with no evidence of recurrence.  Patient remains on slightly lower immunosuppression.    # Papillary Thyroid Cancer, s/p Thyroidectomy: Patient is doing fine with no evidence of recurrence.  He is on levothyroxine following his thyroidectomy.    # EBV Viremia: Minimal EBV PCR at less than 1000 with last check 3/2022, likely of no clinical significance.  He is asymptomatic.   - No need to check EBV PCR unless clinically indicated.    # Anxiety/Panic Disorder: Stable and only rare use of Ativan.    # Skin Cancer Risk:    - Discussed sun protection and recommend regular follow up with Dermatology.    # Medical Compliance: Yes     # COVID-19 Virus Review: Discussed COVID-19 virus and the potential medical risks.  Reviewed preventative health recommendations, including wearing a mask where appropriate.  Recommended COVID vaccination should be up to date with either an initial vaccination or booster shot when appropriate.  Asked the patient to inform the transplant center if they are exposed or diagnosed with this virus.    # COVID Vaccination Up To Date: No, due for next dose    # Transplant History:  Etiology of Kidney Failure: Hepatorenal  syndrome (HRS)  Tx: DDKT (SLK) and Liver Tx (SLK)  Transplant: 5/11/2016 (Kidney), 5/10/2016 (Liver)  Significant changes in immunosuppression: Decreased immunosuppression due to PTLD  Significant transplant-related complications: BK Viremia, EBV Viremia and Post-Transplant Lymphoproliferative Disorder (PTLD)    Transplant Office Phone Number: [phone]    Assessment and plan was discussed with the patient and he voiced his understanding and agreement.    Return visit: Return in about 1 year (around 10/12/2023).    Jake Sidhu MD    Chief Complaint   Mr. Chen is a 42 year old here for kidney transplant and immunosuppression management.    History of Present Illness    Mr. Chen reports feeling good overall with some medical complaints.  Since last clinic visit, patient reports no hospitalizations or new medical complaints and has been doing well overall.  He did get a COVID infection in June with mild symptoms, mostly cough and fatigue for a couple of days.  Patient received monoclonal antibodies and did okay.    His energy level is good and remains normal.  He is active and does get some exercise.  Denies any chest pain or shortness of breath with exertion.  No palpitations.  No leg swelling.    Appetite is good, but he has been trying to eat healthier and has lost 10-15 lbs.  No nausea, vomiting or diarrhea.  No fever sweats or chills.  No night sweats.  His anxiety is stable with rare use of Ativan.    Home BP: Not checked    Problem List   Patient Active Problem List   Diagnosis     Atrial flutter (H)     Esophageal varices (H)     Insomnia     Alcoholic hepatitis     History of alcohol abuse     History of transposition of great vessels     Depression     Thrombocytopenia (H)     [facility]     Liver replaced by transplant (H)     Kidney replaced by transplant     Immunosuppression (H)     Hypomagnesemia     Secondary renal  hyperparathyroidism (H)     Aftercare following organ transplant     Post-transplant lymphoproliferative disorder (H)     Papillary thyroid carcinoma (H)     Advance directive discussed with patient     HTN, kidney transplant related     Paroxysmal atrial fibrillation (H)     Postoperative hypothyroidism     Dermatitis, seborrheic     CKD (chronic kidney disease) stage 2, GFR 60-89 ml/min     EBV (Abelino-Barr virus) viremia       Allergies   Allergies   Allergen Reactions     Hydromorphone Itching       Medications   Current Outpatient Medications   Medication Sig     ELIQUIS ANTICOAGULANT 5 MG tablet TAKE ONE TABLET BY MOUTH TWICE A DAY     levothyroxine (SYNTHROID/LEVOTHROID) 137 MCG tablet Take 1 tablet (137 mcg) by mouth daily     lisinopril (ZESTRIL) 20 MG tablet Take 1 tablet (20 mg) by mouth daily     LORazepam (ATIVAN) 1 MG tablet Take 1 tablet (1 mg) by mouth daily as needed (severe anxiety/panic/sleep)     magnesium oxide (MAG-OX) 400 MG tablet Take 1 tablet (400 mg) by mouth 2 times daily     metoprolol succinate ER (TOPROL-XL) 25 MG 24 hr tablet Take 1 tablet (25 mg) by mouth 2 times daily     mycophenolate (GENERIC EQUIVALENT) 250 MG capsule Take 2 capsules (500 mg) by mouth 2 times daily     sulfamethoxazole-trimethoprim (BACTRIM) 400-80 MG tablet Take 1 tablet by mouth daily     tacrolimus (GENERIC EQUIVALENT) 0.5 MG capsule TAKE ONE CAPSULE BY MOUTH EVERY MORNING (TOTAL DOSE IS 1.5MG IN  THE MORNING AND 1MG IN THE EVENING)     tacrolimus (GENERIC EQUIVALENT) 1 MG capsule TAKE ONE CAPSULE BY MOUTH TWICE A DAY (TOTAL DOSE IS 1.5MG IN THE MORNING AND 1MG IN THE EVENING)     traZODone (DESYREL) 50 MG tablet Take 1 tablet (50 mg) by mouth at bedtime as needed, may repeat once for sleep     No current facility-administered medications for this visit.     There are no discontinued medications.    Physical Exam   Vital Signs: There were no vitals taken for this visit.    GENERAL APPEARANCE: alert and no  distress  HENT: no obvious abnormalities on appearance  RESP: breathing appears unremarkable with normal rate, no audible wheezing or cough and no apparent shortness of breath with conversation  MS: extremities normal - no gross deformities noted  SKIN: no apparent rash and normal skin tone  NEURO: speech is clear with no obvious neurological deficits  PSYCH: mentation appears normal and affect normal    Data     Renal Latest Ref Rng & Units 9/6/2022 7/5/2022 5/3/2022   Na 133 - 144 mmol/L 140 138 142   K 3.4 - 5.3 mmol/L 4.7 4.6 4.2   Cl 94 - 109 mmol/L 108 106 113(H)   CO2 20 - 32 mmol/L 25 26 20   BUN 7 - 30 mg/dL 17 17 14   Cr 0.66 - 1.25 mg/dL 1.43(H) 1.54(H) 1.34(H)   Glucose 70 - 99 mg/dL 122(H) 110(H) 135(H)   Ca  8.5 - 10.1 mg/dL 9.3 10.7(H) 9.5   Mg 1.6 - 2.3 mg/dL 2.1 2.2 2.0     Bone Health Latest Ref Rng & Units 10/2/2020 8/7/2017 8/7/2017   Phos 2.5 - 4.5 mg/dL 2.3(L) - -   PTHi 12 - 72 pg/mL - 167(H) 109(H)   Vit D Def 20 - 75 ug/L - - -     Heme Latest Ref Rng & Units 9/6/2022 7/5/2022 5/3/2022   WBC 4.0 - 11.0 10e3/uL 4.3 5.6 4.8   Hgb 13.3 - 17.7 g/dL 15.4 16.9 15.1   Plt 150 - 450 10e3/uL 160 154 140(L)   ABSOLUTE NEUTROPHIL 1.6 - 8.3 10e9/L - - -   ABSOLUTE LYMPHOCYTES 0.8 - 5.3 10e9/L - - -   ABSOLUTE MONOCYTES 0.0 - 1.3 10e9/L - - -   ABSOLUTE EOSINOPHILS 0.0 - 0.7 10e9/L - - -   ABSOLUTE BASOPHILS 0.0 - 0.2 10e9/L - - -   ABS IMMATURE GRANULOCYTES 0 - 0.4 10e9/L - - -   ABSOLUTE NUCLEATED RBC - - - -     Liver Latest Ref Rng & Units 9/6/2022 7/5/2022 5/3/2022   AP 40 - 150 U/L 74 68 61   TBili 0.2 - 1.3 mg/dL 0.5 0.9 0.4   DBili 0.0 - 0.2 mg/dL 0.1 0.2 <0.1   ALT 0 - 70 U/L 33 47 40   AST 0 - 45 U/L 23 27 21   Tot Protein 6.8 - 8.8 g/dL 7.0 7.9 7.0   Albumin 3.4 - 5.0 g/dL 4.0 4.6 4.1     Pancreas Latest Ref Rng & Units 3/6/2018 5/12/2016 5/10/2016   A1C 4.3 - 6.0 % - 5.1 -   Amylase 30 - 110 U/L - 47 100   Lipase 73 - 393 U/L 95 125 -     Iron studies Latest Ref Rng & Units 7/14/2016  6/21/2014   Iron 35 - 180 ug/dL 84 101   Iron sat 15 - 46 % 29 71(H)   Ferritin 26 - 388 ng/mL 171 -     [facility] Txp Virology Latest Ref Rng & Units 3/16/2022 6/2/2020 1/7/2020   CVM DNA Quant - - - -   CMV QUANT IU/ML CMVND:CMV DNA Not Detected [IU]/mL - - -   LOG IU/ML OF CMVQNT <2.1 [Log:IU]/mL - - -   BK Spec - - - -   BK Res BKNEG:BK Virus DNA Not Detected copies/mL - - -   BK Log <2.7 Log copies/mL - - -   EBV CAPSID ANTIBODY IGG 0.0 - 0.8 AI - - -   EBV DNA COPIES/ML EBVNEG:EBV DNA Not Detected [Copies]/mL - EBV DNA Not Detected <500(A)   EBV DNA LOG OF COPIES - 2.8 Not Calculated <2.7   Hep B Core NR - - -        Recent Labs   Lab Test 05/03/22  1125 07/05/22  1114 09/06/22  1120   DOSTAC 5/2/2022 7/4/2022 9/5/2022   TACROL 3.6* 5.8 5.2     Recent Labs   Lab Test 05/19/16  0714   DOSMPA 1,900   MPACID <0.25*   MPAG 51.0       Jake Sidhu MD

## 2022-10-12 NOTE — PROGRESS NOTES
Cricket is a 42 year old who is being evaluated via a billable video visit.      How would you like to obtain your AVS? [employer]  If the video visit is dropped, the invitation should be resent by: Send to e-mail at: [email or web address]  Will anyone else be joining your video visit? No    Video-Visit Details    Video Start Time: 1134    Type of service:  Video Visit    Video End Time:1146    Originating Location (pt. Location): Home    Distant Location (provider location):  [facility] [place]     Platform used for Video Visit: [facility]      TRANSPLANT NEPHROLOGY CHRONIC POST TRANSPLANT VISIT    Assessment & Plan   # DDKT (SLK): Stable   - Baseline Creatinine:  ~ 1.3-1.6   - Proteinuria: Normal (<0.2 grams)   - Date DSA Last Checked: Oct/2020      Latest DSA: No   - BK Viremia: No   - Kidney Tx Biopsy: Oct 30, 2019; Result: No diagnostic evidence of acute rejection.  Some i-IFTA with inflamed area, but otherwise minimal interstitial fibrosis or tubular atrophy.             Sep 26, 2018; Result: No diagnostic evidence of acute rejection.  Mild arteriosclerosis.    # Liver Tx (SLK): Appears to be stable with normal LFTs.  Follows closely with Hepatology.    # Immunosuppression: Tacrolimus immediate release (goal 3-5), Mycophenolate mofetil (dose 500 mg every 12 hours) and Prednisone (dose 5 mg daily)   - Continue with intensive monitoring of immunosuppression for efficacy and toxicity.   - Changes: No    # Infection Prophylaxis:   Last CD4 Level: Not checked  - PJP: Sulfa/TMP (Bactrim)    # Hypertension: Not checked recently;  Goal BP: < 130/80   - Changes: Not at this time; Recommend patient check blood pressure at home on a regular basis, such as once every week or two, and follow up with PCP if above goal.    # Elevated Blood Glucose: Glucose generally running ~ 110-130s   - Management as per primary care.    # Post-Transplant Erythrocytosis: Hgb: Stable;  On ACEI/ARB: Yes   Imaging: Yes -  6/2021 ultrasounds with no concerning lesions    # Mineral Bone Disorder:   - Vitamin D; level: Not checked recently        On supplement: No  - Calcium; level: Normal        On supplement: No    # Electrolytes:   - Potassium; level: Normal        On supplement: No  - Magnesium; level: Normal        On supplement: Yes  - Bicarbonate; level: Normal        On supplement: No    # H/o Transposition of Great Vessels, s/p Surgery: Appears to be doing well.  Last cardiac echo (congenital) 11/2021 with no apparent changes.  Follows closely with Cardiology.    # H/o Atrial Fibrillation/Flutter, s/p Ablation: No recent episodes or palpitations.    # PTLD: Diagnosed in 2017, s/p treatment with R-CEOP with no evidence of recurrence.  Patient remains on slightly lower immunosuppression.    # Papillary Thyroid Cancer, s/p Thyroidectomy: Patient is doing fine with no evidence of recurrence.  He is on levothyroxine following his thyroidectomy.    # EBV Viremia: Minimal EBV PCR at less than 1000 with last check 3/2022, likely of no clinical significance.  He is asymptomatic.   - No need to check EBV PCR unless clinically indicated.    # Anxiety/Panic Disorder: Stable and only rare use of Ativan.    # Skin Cancer Risk:    - Discussed sun protection and recommend regular follow up with Dermatology.    # Medical Compliance: Yes     # COVID-19 Virus Review: Discussed COVID-19 virus and the potential medical risks.  Reviewed preventative health recommendations, including wearing a mask where appropriate.  Recommended COVID vaccination should be up to date with either an initial vaccination or booster shot when appropriate.  Asked the patient to inform the transplant center if they are exposed or diagnosed with this virus.    # COVID Vaccination Up To Date: No, due for next dose    # Transplant History:  Etiology of Kidney Failure: Hepatorenal syndrome (HRS)  Tx: DDKT ([facility]) and Liver Tx ([facility])  Transplant: 5/11/2016 (Kidney), 5/10/2016  (Liver)  Significant changes in immunosuppression: Decreased immunosuppression due to PTLD  Significant transplant-related complications: BK Viremia, EBV Viremia and Post-Transplant Lymphoproliferative Disorder (PTLD)    Transplant Office Phone Number: [phone]    Assessment and plan was discussed with the patient and he voiced his understanding and agreement.    Return visit: Return in about 1 year (around 10/12/2023).    Jake Sidhu MD    Chief Complaint   Mr. Chen is a 42 year old here for kidney transplant and immunosuppression management.    History of Present Illness    Mr. Chen reports feeling good overall with some medical complaints.  Since last clinic visit, patient reports no hospitalizations or new medical complaints and has been doing well overall.  He did get a COVID infection in June with mild symptoms, mostly cough and fatigue for a couple of days.  Patient received monoclonal antibodies and did okay.    His energy level is good and remains normal.  He is active and does get some exercise.  Denies any chest pain or shortness of breath with exertion.  No palpitations.  No leg swelling.    Appetite is good, but he has been trying to eat healthier and has lost 10-15 lbs.  No nausea, vomiting or diarrhea.  No fever sweats or chills.  No night sweats.  His anxiety is stable with rare use of Ativan.    Home BP: Not checked    Problem List   Patient Active Problem List   Diagnosis     Atrial flutter (H)     Esophageal varices (H)     Insomnia     Alcoholic hepatitis     History of alcohol abuse     History of transposition of great vessels     Depression     Thrombocytopenia (H)     [facility]     Liver replaced by transplant (H)     Kidney replaced by transplant     Immunosuppression (H)     Hypomagnesemia     Secondary renal hyperparathyroidism (H)     Aftercare following organ transplant     Post-transplant  lymphoproliferative disorder (H)     Papillary thyroid carcinoma (H)     Advance directive discussed with patient     HTN, kidney transplant related     Paroxysmal atrial fibrillation (H)     Postoperative hypothyroidism     Dermatitis, seborrheic     CKD (chronic kidney disease) stage 2, GFR 60-89 ml/min     EBV (Abelino-Barr virus) viremia       Allergies   Allergies   Allergen Reactions     Hydromorphone Itching       Medications   Current Outpatient Medications   Medication Sig     ELIQUIS ANTICOAGULANT 5 MG tablet TAKE ONE TABLET BY MOUTH TWICE A DAY     levothyroxine (SYNTHROID/LEVOTHROID) 137 MCG tablet Take 1 tablet (137 mcg) by mouth daily     lisinopril (ZESTRIL) 20 MG tablet Take 1 tablet (20 mg) by mouth daily     LORazepam (ATIVAN) 1 MG tablet Take 1 tablet (1 mg) by mouth daily as needed (severe anxiety/panic/sleep)     magnesium oxide (MAG-OX) 400 MG tablet Take 1 tablet (400 mg) by mouth 2 times daily     metoprolol succinate ER (TOPROL-XL) 25 MG 24 hr tablet Take 1 tablet (25 mg) by mouth 2 times daily     mycophenolate (GENERIC EQUIVALENT) 250 MG capsule Take 2 capsules (500 mg) by mouth 2 times daily     sulfamethoxazole-trimethoprim (BACTRIM) 400-80 MG tablet Take 1 tablet by mouth daily     tacrolimus (GENERIC EQUIVALENT) 0.5 MG capsule TAKE ONE CAPSULE BY MOUTH EVERY MORNING (TOTAL DOSE IS 1.5MG IN  THE MORNING AND 1MG IN THE EVENING)     tacrolimus (GENERIC EQUIVALENT) 1 MG capsule TAKE ONE CAPSULE BY MOUTH TWICE A DAY (TOTAL DOSE IS 1.5MG IN THE MORNING AND 1MG IN THE EVENING)     traZODone (DESYREL) 50 MG tablet Take 1 tablet (50 mg) by mouth at bedtime as needed, may repeat once for sleep     No current facility-administered medications for this visit.     There are no discontinued medications.    Physical Exam   Vital Signs: There were no vitals taken for this visit.    GENERAL APPEARANCE: alert and no distress  HENT: no obvious abnormalities on appearance  RESP: breathing appears  unremarkable with normal rate, no audible wheezing or cough and no apparent shortness of breath with conversation  MS: extremities normal - no gross deformities noted  SKIN: no apparent rash and normal skin tone  NEURO: speech is clear with no obvious neurological deficits  PSYCH: mentation appears normal and affect normal    Data     Renal Latest Ref Rng & Units 9/6/2022 7/5/2022 5/3/2022   Na 133 - 144 mmol/L 140 138 142   K 3.4 - 5.3 mmol/L 4.7 4.6 4.2   Cl 94 - 109 mmol/L 108 106 113(H)   CO2 20 - 32 mmol/L 25 26 20   BUN 7 - 30 mg/dL 17 17 14   Cr 0.66 - 1.25 mg/dL 1.43(H) 1.54(H) 1.34(H)   Glucose 70 - 99 mg/dL 122(H) 110(H) 135(H)   Ca  8.5 - 10.1 mg/dL 9.3 10.7(H) 9.5   Mg 1.6 - 2.3 mg/dL 2.1 2.2 2.0     Bone Health Latest Ref Rng & Units 10/2/2020 8/7/2017 8/7/2017   Phos 2.5 - 4.5 mg/dL 2.3(L) - -   PTHi 12 - 72 pg/mL - 167(H) 109(H)   Vit D Def 20 - 75 ug/L - - -     Heme Latest Ref Rng & Units 9/6/2022 7/5/2022 5/3/2022   WBC 4.0 - 11.0 10e3/uL 4.3 5.6 4.8   Hgb 13.3 - 17.7 g/dL 15.4 16.9 15.1   Plt 150 - 450 10e3/uL 160 154 140(L)   ABSOLUTE NEUTROPHIL 1.6 - 8.3 10e9/L - - -   ABSOLUTE LYMPHOCYTES 0.8 - 5.3 10e9/L - - -   ABSOLUTE MONOCYTES 0.0 - 1.3 10e9/L - - -   ABSOLUTE EOSINOPHILS 0.0 - 0.7 10e9/L - - -   ABSOLUTE BASOPHILS 0.0 - 0.2 10e9/L - - -   ABS IMMATURE GRANULOCYTES 0 - 0.4 10e9/L - - -   ABSOLUTE NUCLEATED RBC - - - -     Liver Latest Ref Rng & Units 9/6/2022 7/5/2022 5/3/2022   AP 40 - 150 U/L 74 68 61   TBili 0.2 - 1.3 mg/dL 0.5 0.9 0.4   DBili 0.0 - 0.2 mg/dL 0.1 0.2 <0.1   ALT 0 - 70 U/L 33 47 40   AST 0 - 45 U/L 23 27 21   Tot Protein 6.8 - 8.8 g/dL 7.0 7.9 7.0   Albumin 3.4 - 5.0 g/dL 4.0 4.6 4.1     Pancreas Latest Ref Rng & Units 3/6/2018 5/12/2016 5/10/2016   A1C 4.3 - 6.0 % - 5.1 -   Amylase 30 - 110 U/L - 47 100   Lipase 73 - 393 U/L 95 125 -     Iron studies Latest Ref Rng & Units 7/14/2016 6/21/2014   Iron 35 - 180 ug/dL 84 101   Iron sat 15 - 46 % 29 71(H)   Ferritin 26 - 388  ng/mL 171 -     [facility] Txp Virology Latest Ref Rng & Units 3/16/2022 6/2/2020 1/7/2020   CVM DNA Quant - - - -   CMV QUANT IU/ML CMVND:CMV DNA Not Detected [IU]/mL - - -   LOG IU/ML OF CMVQNT <2.1 [Log:IU]/mL - - -   BK Spec - - - -   BK Res BKNEG:BK Virus DNA Not Detected copies/mL - - -   BK Log <2.7 Log copies/mL - - -   EBV CAPSID ANTIBODY IGG 0.0 - 0.8 AI - - -   EBV DNA COPIES/ML EBVNEG:EBV DNA Not Detected [Copies]/mL - EBV DNA Not Detected <500(A)   EBV DNA LOG OF COPIES - 2.8 Not Calculated <2.7   Hep B Core NR - - -        Recent Labs   Lab Test 05/03/22  1125 07/05/22  1114 09/06/22  1120   DOSTAC 5/2/2022 7/4/2022 9/5/2022   TACROL 3.6* 5.8 5.2     Recent Labs   Lab Test 05/19/16  0714   DOSMPA 1,900   MPACID <0.25*   MPAG 51.0

## 2022-10-14 ENCOUNTER — VIRTUAL VISIT (OUTPATIENT)
Dept: ENDOCRINOLOGY | Facility: CLINIC | Age: 42
End: 2022-10-14
Payer: COMMERCIAL

## 2022-10-14 DIAGNOSIS — E55.9 VITAMIN D DEFICIENCY, UNSPECIFIED: ICD-10-CM

## 2022-10-14 DIAGNOSIS — C73 PAPILLARY THYROID CARCINOMA (H): Primary | ICD-10-CM

## 2022-10-14 DIAGNOSIS — E89.0 POSTOPERATIVE HYPOTHYROIDISM: ICD-10-CM

## 2022-10-14 PROCEDURE — 99212 OFFICE O/P EST SF 10 MIN: CPT | Mod: 95 | Performed by: INTERNAL MEDICINE

## 2022-10-14 RX ORDER — LEVOTHYROXINE SODIUM 137 UG/1
137 TABLET ORAL DAILY
Qty: 90 TABLET | Refills: 3 | Status: SHIPPED | OUTPATIENT
Start: 2022-10-14 | End: 2023-12-01

## 2022-10-14 NOTE — PROGRESS NOTES
Cricket Chen is being evaluated via a billable video visit.    How would you like to obtain your AVS? [employer]  For the video visit, send the invitation by: Send to e-mail at: [email or web address]  Will anyone else be joining your video visit? No      Video-Visit Details    Type of service:  Video Visit    Originating Location (pt. Location): Home        Distant Location (provider location):  Off-site    Mode of Communication:  Video Conference via [employer]

## 2022-10-14 NOTE — PROGRESS NOTES
Endocrinology Note         Cricket is a 42 year old male presents today for follow up post total thyroidectomy    HPI  Cricket Chen is a 42 years old male with hx of post liver and kidney transplant in 5/2016, posttransplant lymphoproliferative disorder, atrial flutter status post direct current cardioversion, hypertension who is here for follow up post total thyroidectomy    I saw him first time in March 2017 for hypermetabolic focus in the right lobe of the thyroid with a max SUV of 12.7 on PET that was noted during baseline evaluation for diffuse large B cell lymphoma. Otherwise no suspicious FDG uptake within the head and neck. He also has PET-avid right axillary lymphadenopathy measuring 1.3 cm but no evidence of FDG-avid areas throughout the rest of his body.     At that time, US thyroid showed 1 cm mid/inferior solid right thyroid nodule which likely corresponds to the area of FDG avidity on prior PET CT. Subsequent FNA of that nodule showed positive for PTC.     He ultimately went for total thyroidectomy on 8/7/2017 by . His postoperative course was uncomplicated. Pathology showed PTC 0.8 cm in the right lobe with negative margin for carcinoma, 0.2 cm follicular adenoma was also identify in the right lobe. In the left lobe, 0.1 cm of PTC with negative margin and 1.2 cm benign colloid cyst were noted. No lymphovascular invasion was identified.    Interim history  Last seen 5/2021. He has been doing well. He is no longer required to follow up with oncologist anymore after being in remission for many years. He is still seeing nephrologist and hepatologist regularly. Renal function and liver panel are in good range.    Regarding to micro-papillary thyroid cancer and postop hypothyroidism, he is taking levothyroxine 137  g daily. Lab in 5/2021 showed TSH 0.16 and FT4 1.52, Tg 0.36, TgAb<0.4. He denied any neck mass, difficulty swallowing or breathing. He denied any muscle twitching, muscle cramping,  hand numbness or tingling.     Past Medical History  Past Medical History:   Diagnosis Date     Alcohol abuse     Last drink in Mid-April 2014     Anemia in ESRD (end-stage renal disease) (H)      Anxiety 2008     Atrial flutter (H) 2017     Cirrhosis (H)     S/P liver transplant     Depression      History of blood transfusion      History of transposition of great vessels     atrial switch at age 8 months old     Hypertension      Liver transplant recipient (H)     2016     Papillary thyroid carcinoma (H)      Pneumonia 11-15-14     Renal transplant recipient     2016     Varices, esophageal (H)        Allergies  Allergies   Allergen Reactions     Hydromorphone Itching     Medications  Current Outpatient Medications   Medication Sig Dispense Refill     ELIQUIS ANTICOAGULANT 5 MG tablet TAKE ONE TABLET BY MOUTH TWICE A  tablet 2     levothyroxine (SYNTHROID/LEVOTHROID) 137 MCG tablet Take 1 tablet (137 mcg) by mouth daily 90 tablet 0     lisinopril (ZESTRIL) 20 MG tablet Take 1 tablet (20 mg) by mouth daily 90 tablet 3     LORazepam (ATIVAN) 1 MG tablet Take 1 tablet (1 mg) by mouth daily as needed (severe anxiety/panic/sleep) 15 tablet 0     magnesium oxide (MAG-OX) 400 MG tablet Take 1 tablet (400 mg) by mouth 2 times daily 120 tablet 11     metoprolol succinate ER (TOPROL-XL) 25 MG 24 hr tablet Take 1 tablet (25 mg) by mouth 2 times daily 180 tablet 3     mycophenolate (GENERIC EQUIVALENT) 250 MG capsule Take 2 capsules (500 mg) by mouth 2 times daily 120 capsule 11     sulfamethoxazole-trimethoprim (BACTRIM) 400-80 MG tablet Take 1 tablet by mouth daily 30 tablet 11     tacrolimus (GENERIC EQUIVALENT) 0.5 MG capsule TAKE ONE CAPSULE BY MOUTH EVERY MORNING (TOTAL DOSE IS 1.5MG IN  THE MORNING AND 1MG IN THE EVENING) 90 capsule 3     tacrolimus (GENERIC EQUIVALENT) 1 MG capsule TAKE ONE CAPSULE BY MOUTH TWICE A DAY (TOTAL DOSE IS 1.5MG IN THE MORNING AND 1MG IN THE EVENING) 180 capsule 3     traZODone  (DESYREL) 50 MG tablet Take 1 tablet (50 mg) by mouth at bedtime as needed, may repeat once for sleep 180 tablet 0     ketoconazole (NIZORAL) 2 % cream Twice daily to areas of rash on face (Patient not taking: No sig reported) 60 g 1     Family History  family history includes Anxiety Disorder in his mother and sister; Arthritis in his father; Hyperlipidemia in his mother; Hypertension in his father, mother, and sister; No Known Problems in his brother, brother, maternal grandfather, maternal grandmother, paternal grandfather, and paternal grandmother.   No family hx of thyroid cancer    Social History  Social History     Tobacco Use     Smoking status: Former     Packs/day: 1.00     Years: 3.00     Pack years: 3.00     Types: Cigarettes     Start date: 1997     Quit date: 2000     Years since quittin.0     Smokeless tobacco: Former     Quit date: 9/10/2001     Tobacco comments:     Quit chewing in early    Vaping Use     Vaping Use: Never used   Substance Use Topics     Alcohol use: No     Alcohol/week: 0.0 standard drinks     Comment: ~10 drinks per day for ten years, quit in 2014     Drug use: No     Types: Marijuana     Comment: in HS     quit drinking  Non , no children  He is currently working as a  teacher    ROS  Constitutional: weight is down 10 lbs intentionally, good energy, no night sweat  Eyes: no vision change, diplopia or red eyes   Neck: no difficulty swallowing, no choking, no neck pain, no neck swelling  Cardiovascular: no chest pain, palpitations  Respiratory: no dyspnea, cough, shortness of breath or wheezing   GI: no nausea, vomiting, diarrhea or constipation, no abdominal pain   : no change in urine, no dysuria or hematuria  Musculoskeletal: no joint or muscle pain or swelling   Integumentary: no concerning lesions  Neuro: no loss of strength or sensation, no numbness or tingling, no tremor, no dizziness, no headache   Endo: no polyuria or polydipsia,  no temperature intolerance   Heme/Lymph: no concerning bumps, no bleeding problems   Allergy: no environmental allergies   Psych: no depression or anxiety     Physical Exam  Limited due to virtual visit  There is no height or weight on file to calculate BMI.  Constitutional: no distress, comfortable, pleasant   Eyes: anicteric, normal extra-ocular movements, no lid lag or retraction  Skin: no jaundice   Neurological: cranial nerves intact, no tremor on outstretched hands bilaterally  Psychological: appropriate mood     RESULTS  PET 2/4/2017  HEAD/NECK:  Hypermetabolic focus in the right lobe of the thyroid with a max SUV of 12.7. Otherwise no suspicious FDG uptake within the head and neck. There is a 3.0 x 2.2 cm postoperative collection in the right lateral  neck anterior to the sternocleidal mastoid muscle, lateral to the right submandibular gland.   No abnormality identified within the mucosal spaces of the neck. Tongue base is normal. No lymphadenopathy within the neck. Limited head CT is within normal limits.    IMPRESSION:   1. Hypermetabolic right axillary lymph node, suspicious for involvement by lymphoproliferative disease.  2. Hypermetabolic lesion in the right thyroid with a max SUV of 12.7. No definite CT correlate identified. Ultrasound of the thyroid is recommended.  3. Indeterminant 2.2 cm area of decreased enhancement within the right lower quadrant transplant kidney. It does not appear to be  masslike. Ultrasound is recommended for further characterization   4. Postsurgical changes of liver and right lower quadrant kidney transplantation.  5. Transposition of great vessels with postsurgical changes of atrial baffle procedure resulting in a large, presumably intentional atrial septal defect.     US thyroid 3/2/2017  Thyroid parenchyma: Homogeneous  The right lobe of the thyroid measures: 1.6 x 1.6 x 4.5 cm   The thyroid isthmus measures: 0.2 cm   The left lobe of the thyroid measures: 1.6 x 1.1 x 4  cm      Right lobe:  Nodule 1:  Nodule measurement: 0.3 x 0.3 x 0.3 cm  Echogenicity: Predominantly isoechoic  Consistency: Mixed cystic and solid  Calcifications: no  Hypervascular: Minimal peripheral vascularity  Interval growth (>20%): No prior imaging available     Nodule 2:  Nodule measurement: 0.9 x 0.8 x 0.8 cm  Echogenicity: Hypoechoic  Consistency: solid  Calcifications: no  Hypervascular: yes  Interval growth (>20%): No prior imaging available     Isthmus: No nodule     Left Lobe:   Nodule 1:  Nodule measurement: 0.7 x 0.5 x 0.8 cm  Echogenicity: Hypoechoic  Consistency: cystic  Calcifications: no; nondependent echogenic focus with ringdown  artifact most compatible with inspissated colloid.  Hypervascular: no  Interval growth (>20%): No prior imaging available     Impression:  1.  Almost 1 cm mid/inferior solid right thyroid nodule which likely corresponds to the area of FDG avidity on prior PET CT. Consider FNA for further evaluation.  2.  Additional subcentimeter right thyroid nodule and left thyroid colloid cyst are noted    FNA right thyroid nodule 3/16/2017  Thyroid, right #2, ultrasound-guided fine needle aspiration:   Positive for malignancy.   Papillary thyroid carcinoma   Specimen Adequacy: Satisfactory for evaluation.    Surgical pathology 8/7/17  FINAL DIAGNOSIS:   A- Thyroid, right, lobectomy:   - Papillary thyroid microcarcinoma: 0.8 cm in greatest dimension   - Margins negative for carcinoma   - Perineural and vascular invasion not identified.   - Follicular adenoma, 0.2 cm in greatest dimension   - See tumor synopsis for details     B- Thyroid, left, lobectomy:   - Papillary thyroid microcarcinoma: 0.1 cm in greatest dimension   - Margins negative for carcinoma   - Perineural and vascular invasion not identified.   - Benign colloid cyst: 1.2 cm in greatest dimension   - See tumor synopsis for details     Report Name: Thyroid Gland - Resection   Status: Submitted     Part(s) Involved:   A:  Thyroid, right     Synoptic Report:     SPECIMEN     Procedure:         - Total thyroidectomy     TUMOR     Histologic Type:         - Papillary carcinoma       Common Significant Variants:           - Classical (usual, conventional)     Tumor Size: 0.8 cm     Tumor Laterality:         - Right lobe         - Left lobe     Tumor Focality:         - Multifocal     Tumor Extent       Extrathyroidal Extension:           - Not identified     Accessory Tumor Findings       Angioinvasion (vascular invasion):           - Not identified       Lymphatic Invasion:           - Not identified       Perineural Invasion:           - Not identified     MARGINS     Margins:         - Margins uninvolved by carcinoma     LYMPH NODES     Regional Lymph Nodes:         - No nodes submitted or found     STAGE (PTNM)     TNM Descriptors:         - m (multiple primary tumors)     Primary Tumor (pT):         - pT1a:  Tumor 1 cm or less in greatest dimension limited to the   thyroid.     Regional Lymph Nodes (pN):         - pNX: Regional lymph nodes cannot be assessed     ADDITIONAL FINDINGS     Additional Pathologic Findings:         - Adenoma     US head/neck 7/3/2017  COMPARISON: CT neck 3/28/2017, ultrasound thyroid 3/2/2017.     Lymph nodes are measured bilaterally with measurements given in transverse, AP, and length (craniocaudal) dimensions as follows:     Right:  Level 2: No lymph nodes identified.     Level 3:   1: 7 x 2 mm lymph node.     Level 4:  1: 7 x 5 x 11 mm lymph node.  2: 8 x 4 x 9 mm lymph node.     Level 5: No lymph nodes identified.  Level 6: No lymph nodes identified.  Level 7: No lymph nodes identified.     Left:  Level 2:   1: 9 x 5 x 6 mm lymph node.  2: 6 x 4 x 10 mm lymph node.     Level 3: No lymph nodes identified.  Level 4: No lymph nodes identified.  Level 5: No lymph nodes identified.  Level 6: No lymph nodes identified.  Level 7: No lymph nodes identified.     Thyroid: Thyroid nodules partially visualized,  including the 8 mm nodule in the inferior right lobe, and an 8 mm hypoechoic left inferior nodule.      IMPRESSION:  1.  Lymph nodes as described without malignant features.  2.  Partially visualized thyroid nodules, not significantly changed from 3/16/2017.      9/27/2017: TSH 0.13, FT4 1.17, Tg 0.23, TgAb<0.4  2/9/2018: TSH 0.08, free T4 1.52, Tg 0.19, TgAb<0.4  3/6/2018: TSH 0.12, free T4 1.87  4/3/2018: TSH 0.39, free T4 1.36  6/12/2018: TSH 0.94, free T4 1.08  8/10/2018: TSH 2.3, FT4 1.29, Tg 0.33, TgAb <0.4  10/16/2018: TSH 1.16, FT4 1.16  1/17/2019: FT4 1.33  5/10/2019: TSH 0.5, FT4 1.34, Tg 0.19, TgAb<0.4    US neck 8/10/2018  Lymph nodes are measured bilaterally with measurements given in transverse, AP, and craniocaudal dimensions as follows:     Right:  Level 1: No lymphadenopathy  Level 2: No lymphadenopathy  Level 3: 8 x 3 x 10 mm lymph node with a preserved fatty hilum  Level 4: There are 3 lymph nodes with preserved fatty hilum measuring 1.1 x 0.6 x 1.2 cm, 0.8 x 0.3 x 0.9 cm, and 0.8 x 0.4 x 0.8 cm  Level 5: No lymphadenopathy  Level 6: No lymphadenopathy     Left:  Level 1: No lymphadenopathy  Level 2: There are 2 lymph nodes without definite fatty rom which measure 1.1 x 0.4 x 1.0 cm and 0.6 x 0.5 x 0.9 cm  Level 3: No lymphadenopathy  Level 4: 0.7 x 0.3 x 2.1 cm lymph node with a preserved fatty hilum  Level 5: No lymphadenopathy  Level 6: No lymphadenopathy                                                                   IMPRESSION: Soft tissue neck ultrasound with lymph node measurements as described above. No pathologically enlarged or morphologically suspicious lymph nodes. There are two left level 2 lymph nodes which do not have a definite fatty hilum, although they are similar in size and appearance from 7/3/2017 and were not hypermetabolic on PET/CT 7/19/2017.    US neck 5/13/2019  Lymph nodes are measured bilaterally with measurements given in craniocaudal, transverse and AP dimensions  as follows:     Right:  Level 1: No lymphadenopathy  Level 2: No lymphadenopathy  Level 3: No lymphadenopathy  Level 4: 1.4 x 0.6 x 2.2 cm lymph node with a fatty hilum (previously 0.9 x 0.3 x 1.0 cm), 1.2 x 0.6 x 1.2 cm lymph node with a fatty hilum  (previously 0.8 x 0.4 x 0.9 cm), and a 1.0 x 0.6 x 0.8 cm lymph node with a fatty hilum  Level 5: No lymphadenopathy  Level 6: No lymphadenopathy  Level 7: No lymphadenopathy     Left:  Level 1: No lymphadenopathy  Level 2: 1.3 x 0.8 x 1.5 cm lymph node with a small fatty hilum, and a 6 x 5 x 11 mm lymph node with a small fatty hilum  Level 3: No lymphadenopathy  Level 4: 3 x 7 x 12 mm lymph node with a small fatty hilum  Level 5: No lymphadenopathy  Level 6: No lymphadenopathy  Level 7: No lymphadenopathy                                                                    IMPRESSION: Soft tissue neck ultrasound with lymph node measurements as described above. Most notably, two of the right level 4 lymph nodes have enlarged from 8/10/2018.     Ultrasound neck 2/12/2020  Lymph nodes are measured bilaterally with measurements given in craniocaudal, transverse and AP dimensions as follows:     Right:  Level 1: No lymphadenopathy  Level 2: No lymphadenopathy  Level 3: No lymphadenopathy  Level 4: 1.1 x 0.9 x 2.3 cm  lymph node with a fatty hilum (previously 1.4 x 0.6 x 2.2 cm), 0.9 x 0.9 x 1.3 cm lymph node with a fatty hilum (previously 1.2 x 0.6 x 1.2 cm), and a 0.8 x 0.8 x 0.8 cm lymph node  with a fatty hilum (previously 1.0 x 0.6 x 0.8 cm)  Level 5: No lymphadenopathy  Level 6: No lymphadenopathy  Level 7: No lymphadenopathy     Left:  Level 1: No lymphadenopathy  Level 2: 1.2 x 0.6 x 1.2 cm ovoid lymph node, previously 1.3 x 0.8 x 1.5 cm. 8 x 6 x 9 mm lymph node with echogenic hilum, previously 6 x 5 x 11 mm. Additional 1.3 x 0.7 x 2 cm lymph node with echogenic hilum. Overall these are favored as reactive in nature.  Level 3: No lymphadenopathy  Level 4: 3 x 8 x 12  mm lymph node with a small fatty hilum, unchanged  Level 5: No lymphadenopathy  Level 6: No lymphadenopathy  Level 7: No lymphadenopathy     Overall no new lymphadenopathy in the neck. No worrisome cervical lymph nodes to suggest metastatic lymphadenopathy.                                                                   IMPRESSION: Cervical lymph nodes as described above. No enlarging or new lymphadenopathy to suggest metastatic lymphadenopathy.    ASSESSMENT:    Cricket Chen is a 42 years old male with hx of post liver and kidney transplant in 5/2016, posttransplant lymphoproliferative disorder, atrial flutter status post direct current cardioversion, hypertension who is here for follow up post total thyroidectomy    1) multifocal micro PTC: he was noted to have thyroid cancer during evaluation of diffuse large B cell lymphoma. He was initially noted for hypermetabolic activity at right thyroid gland with SUV max of 12.7 from PET scan. Subsequent FNA of right thyroid nodule showed positive for thyroid cancer. He underwent total thyroidectomy without complications.  His prognosis is favorable given micro PTC, negative margin and no lymphovascular invasion.  Follow up yearly US neck showed no suspicious lesion  Lab in 5/2021 showed TSH 0.16 and FT4 1.52, Tg 0.36, TgAb<0.4.   I will check TSH, FT4, Tg, TgAb, today    2) postoperative hypothyroidism: clinically euthyroid.  Currently on levothyroxine 137 mcg daily. Will check lab today as per #1.    PLAN:   - lab for TSH, FT4, Tg, TgAb  - RTC 1 year     Start 1120 am  End 1125 am  VDO duration 5 minutes    External notes/medical records independently reviewed, labs and imaging independently reviewed, medical management and tests to be discussed/communicated to patient.    Time: I spent 15  minutes spent on the date of the encounter preparing to see patient (including chart review and preparation), obtaining and or reviewing additional medical history, performing a  physical exam and evaluation, documenting clinical information in the electronic health record, independently interpreting results, communicating results to the patient and coordinating care.      Zuleyma Gray MD  Division of Diabetes and Endocrinology  Department of Medicine  [phone]

## 2022-10-14 NOTE — PATIENT INSTRUCTIONS
Lab this year    If you have any questions, please do not hesitate to call clinic line at [phone] and ask for Endocrinology clinic.  If you need to fax, please fax to clinic fax number at [phone]    After clinic hours or weekends, please contact [phone] and ask for Endocrinologist-on call      Sincerely,    Zuleyma Gray MD  Endocrinology

## 2022-10-14 NOTE — LETTER
10/14/2022       RE: Cricket Chen  [address]  [facility] [address]     Dear Colleague,    Thank you for referring your patient, Cricket Chen, to the [facility] ENDO[facility] [place] at [facility]. Please see a copy of my visit note below.    Cricket Chen is being evaluated via a billable video visit.    How would you like to obtain your AVS? [employer]  For the video visit, send the invitation by: Send to e-mail at: [email or web address]  Will anyone else be joining your video visit? No      Video-Visit Details    Type of service:  Video Visit    Originating Location (pt. Location): Home        Distant Location (provider location):  Off-site    Mode of Communication:  Video Conference via [facility]               Endocrinology Note         Cricket is a 42 year old male presents today for follow up post total thyroidectomy    HPI  Cricket Chen is a 42 years old male with hx of post liver and kidney transplant in 5/2016, posttransplant lymphoproliferative disorder, atrial flutter status post direct current cardioversion, hypertension who is here for follow up post total thyroidectomy    I saw him first time in March 2017 for hypermetabolic focus in the right lobe of the thyroid with a max SUV of 12.7 on PET that was noted during baseline evaluation for diffuse large B cell lymphoma. Otherwise no suspicious FDG uptake within the head and neck. He also has PET-avid right axillary lymphadenopathy measuring 1.3 cm but no evidence of FDG-avid areas throughout the rest of his body.     At that time, US thyroid showed 1 cm mid/inferior solid right thyroid nodule which likely corresponds to the area of FDG avidity on prior PET CT. Subsequent FNA of that nodule showed positive for PTC.     He ultimately went for total thyroidectomy on 8/7/2017 by . His postoperative course was uncomplicated. Pathology showed PTC 0.8 cm in the  right lobe with negative margin for carcinoma, 0.2 cm follicular adenoma was also identify in the right lobe. In the left lobe, 0.1 cm of PTC with negative margin and 1.2 cm benign colloid cyst were noted. No lymphovascular invasion was identified.    Interim history  Last seen 5/2021. He has been doing well. He is no longer required to follow up with oncologist anymore after being in remission for many years. He is still seeing nephrologist and hepatologist regularly. Renal function and liver panel are in good range.    Regarding to micro-papillary thyroid cancer and postop hypothyroidism, he is taking levothyroxine 137  g daily. Lab in 5/2021 showed TSH 0.16 and FT4 1.52, Tg 0.36, TgAb<0.4. He denied any neck mass, difficulty swallowing or breathing. He denied any muscle twitching, muscle cramping, hand numbness or tingling.     Past Medical History  Past Medical History:   Diagnosis Date     Alcohol abuse     Last drink in Mid-April 2014     Anemia in ESRD (end-stage renal disease) (H)      Anxiety 2008     Atrial flutter (H) 2017     Cirrhosis (H)     S/P liver transplant     Depression      History of blood transfusion      History of transposition of great vessels     atrial switch at age 8 months old     Hypertension      Liver transplant recipient (H)     2016     Papillary thyroid carcinoma (H)      Pneumonia 11-15-14     Renal transplant recipient     2016     Varices, esophageal (H)        Allergies  Allergies   Allergen Reactions     Hydromorphone Itching     Medications  Current Outpatient Medications   Medication Sig Dispense Refill     ELIQUIS ANTICOAGULANT 5 MG tablet TAKE ONE TABLET BY MOUTH TWICE A  tablet 2     levothyroxine (SYNTHROID/LEVOTHROID) 137 MCG tablet Take 1 tablet (137 mcg) by mouth daily 90 tablet 0     lisinopril (ZESTRIL) 20 MG tablet Take 1 tablet (20 mg) by mouth daily 90 tablet 3     LORazepam (ATIVAN) 1 MG tablet Take 1 tablet (1 mg) by mouth daily as needed (severe  anxiety/panic/sleep) 15 tablet 0     magnesium oxide (MAG-OX) 400 MG tablet Take 1 tablet (400 mg) by mouth 2 times daily 120 tablet 11     metoprolol succinate ER (TOPROL-XL) 25 MG 24 hr tablet Take 1 tablet (25 mg) by mouth 2 times daily 180 tablet 3     mycophenolate (GENERIC EQUIVALENT) 250 MG capsule Take 2 capsules (500 mg) by mouth 2 times daily 120 capsule 11     sulfamethoxazole-trimethoprim (BACTRIM) 400-80 MG tablet Take 1 tablet by mouth daily 30 tablet 11     tacrolimus (GENERIC EQUIVALENT) 0.5 MG capsule TAKE ONE CAPSULE BY MOUTH EVERY MORNING (TOTAL DOSE IS 1.5MG IN  THE MORNING AND 1MG IN THE EVENING) 90 capsule 3     tacrolimus (GENERIC EQUIVALENT) 1 MG capsule TAKE ONE CAPSULE BY MOUTH TWICE A DAY (TOTAL DOSE IS 1.5MG IN THE MORNING AND 1MG IN THE EVENING) 180 capsule 3     traZODone (DESYREL) 50 MG tablet Take 1 tablet (50 mg) by mouth at bedtime as needed, may repeat once for sleep 180 tablet 0     ketoconazole (NIZORAL) 2 % cream Twice daily to areas of rash on face (Patient not taking: No sig reported) 60 g 1     Family History  family history includes Anxiety Disorder in his mother and sister; Arthritis in his father; Hyperlipidemia in his mother; Hypertension in his father, mother, and sister; No Known Problems in his brother, brother, maternal grandfather, maternal grandmother, paternal grandfather, and paternal grandmother.   No family hx of thyroid cancer    Social History  Social History     Tobacco Use     Smoking status: Former     Packs/day: 1.00     Years: 3.00     Pack years: 3.00     Types: Cigarettes     Start date: 1997     Quit date: 2000     Years since quittin.0     Smokeless tobacco: Former     Quit date: 9/10/2001     Tobacco comments:     Quit chewing in early    Vaping Use     Vaping Use: Never used   Substance Use Topics     Alcohol use: No     Alcohol/week: 0.0 standard drinks     Comment: ~10 drinks per day for ten years, quit in 2014      Drug use: No     Types: Marijuana     Comment: in HS     quit drinking  Non , no children  He is currently working as a  teacher    ROS  Constitutional: weight is down 10 lbs intentionally, good energy, no night sweat  Eyes: no vision change, diplopia or red eyes   Neck: no difficulty swallowing, no choking, no neck pain, no neck swelling  Cardiovascular: no chest pain, palpitations  Respiratory: no dyspnea, cough, shortness of breath or wheezing   GI: no nausea, vomiting, diarrhea or constipation, no abdominal pain   : no change in urine, no dysuria or hematuria  Musculoskeletal: no joint or muscle pain or swelling   Integumentary: no concerning lesions  Neuro: no loss of strength or sensation, no numbness or tingling, no tremor, no dizziness, no headache   Endo: no polyuria or polydipsia, no temperature intolerance   Heme/Lymph: no concerning bumps, no bleeding problems   Allergy: no environmental allergies   Psych: no depression or anxiety     Physical Exam  Limited due to virtual visit  There is no height or weight on file to calculate BMI.  Constitutional: no distress, comfortable, pleasant   Eyes: anicteric, normal extra-ocular movements, no lid lag or retraction  Skin: no jaundice   Neurological: cranial nerves intact, no tremor on outstretched hands bilaterally  Psychological: appropriate mood     RESULTS  PET 2/4/2017  HEAD/NECK:  Hypermetabolic focus in the right lobe of the thyroid with a max SUV of 12.7. Otherwise no suspicious FDG uptake within the head and neck. There is a 3.0 x 2.2 cm postoperative collection in the right lateral  neck anterior to the sternocleidal mastoid muscle, lateral to the right submandibular gland.   No abnormality identified within the mucosal spaces of the neck. Tongue base is normal. No lymphadenopathy within the neck. Limited head CT is within normal limits.    IMPRESSION:   1. Hypermetabolic right axillary lymph node, suspicious for involvement by  lymphoproliferative disease.  2. Hypermetabolic lesion in the right thyroid with a max SUV of 12.7. No definite CT correlate identified. Ultrasound of the thyroid is recommended.  3. Indeterminant 2.2 cm area of decreased enhancement within the right lower quadrant transplant kidney. It does not appear to be  masslike. Ultrasound is recommended for further characterization   4. Postsurgical changes of liver and right lower quadrant kidney transplantation.  5. Transposition of great vessels with postsurgical changes of atrial baffle procedure resulting in a large, presumably intentional atrial septal defect.     US thyroid 3/2/2017  Thyroid parenchyma: Homogeneous  The right lobe of the thyroid measures: 1.6 x 1.6 x 4.5 cm   The thyroid isthmus measures: 0.2 cm   The left lobe of the thyroid measures: 1.6 x 1.1 x 4 cm      Right lobe:  Nodule 1:  Nodule measurement: 0.3 x 0.3 x 0.3 cm  Echogenicity: Predominantly isoechoic  Consistency: Mixed cystic and solid  Calcifications: no  Hypervascular: Minimal peripheral vascularity  Interval growth (>20%): No prior imaging available     Nodule 2:  Nodule measurement: 0.9 x 0.8 x 0.8 cm  Echogenicity: Hypoechoic  Consistency: solid  Calcifications: no  Hypervascular: yes  Interval growth (>20%): No prior imaging available     Isthmus: No nodule     Left Lobe:   Nodule 1:  Nodule measurement: 0.7 x 0.5 x 0.8 cm  Echogenicity: Hypoechoic  Consistency: cystic  Calcifications: no; nondependent echogenic focus with ringdown  artifact most compatible with inspissated colloid.  Hypervascular: no  Interval growth (>20%): No prior imaging available     Impression:  1.  Almost 1 cm mid/inferior solid right thyroid nodule which likely corresponds to the area of FDG avidity on prior PET CT. Consider FNA for further evaluation.  2.  Additional subcentimeter right thyroid nodule and left thyroid colloid cyst are noted    FNA right thyroid nodule 3/16/2017  Thyroid, right #2,  ultrasound-guided fine needle aspiration:   Positive for malignancy.   Papillary thyroid carcinoma   Specimen Adequacy: Satisfactory for evaluation.    Surgical pathology 8/7/17  FINAL DIAGNOSIS:   A- Thyroid, right, lobectomy:   - Papillary thyroid microcarcinoma: 0.8 cm in greatest dimension   - Margins negative for carcinoma   - Perineural and vascular invasion not identified.   - Follicular adenoma, 0.2 cm in greatest dimension   - See tumor synopsis for details     B- Thyroid, left, lobectomy:   - Papillary thyroid microcarcinoma: 0.1 cm in greatest dimension   - Margins negative for carcinoma   - Perineural and vascular invasion not identified.   - Benign colloid cyst: 1.2 cm in greatest dimension   - See tumor synopsis for details     Report Name: Thyroid Gland - Resection   Status: Submitted     Part(s) Involved:   A: Thyroid, right     Synoptic Report:     SPECIMEN     Procedure:         - Total thyroidectomy     TUMOR     Histologic Type:         - Papillary carcinoma       Common Significant Variants:           - Classical (usual, conventional)     Tumor Size: 0.8 cm     Tumor Laterality:         - Right lobe         - Left lobe     Tumor Focality:         - Multifocal     Tumor Extent       Extrathyroidal Extension:           - Not identified     Accessory Tumor Findings       Angioinvasion (vascular invasion):           - Not identified       Lymphatic Invasion:           - Not identified       Perineural Invasion:           - Not identified     MARGINS     Margins:         - Margins uninvolved by carcinoma     LYMPH NODES     Regional Lymph Nodes:         - No nodes submitted or found     STAGE (PTNM)     TNM Descriptors:         - m (multiple primary tumors)     Primary Tumor (pT):         - pT1a:  Tumor 1 cm or less in greatest dimension limited to the   thyroid.     Regional Lymph Nodes (pN):         - pNX: Regional lymph nodes cannot be assessed     ADDITIONAL FINDINGS     Additional Pathologic  Findings:         - Adenoma     US head/neck 7/3/2017  COMPARISON: CT neck 3/28/2017, ultrasound thyroid 3/2/2017.     Lymph nodes are measured bilaterally with measurements given in transverse, AP, and length (craniocaudal) dimensions as follows:     Right:  Level 2: No lymph nodes identified.     Level 3:   1: 7 x 2 mm lymph node.     Level 4:  1: 7 x 5 x 11 mm lymph node.  2: 8 x 4 x 9 mm lymph node.     Level 5: No lymph nodes identified.  Level 6: No lymph nodes identified.  Level 7: No lymph nodes identified.     Left:  Level 2:   1: 9 x 5 x 6 mm lymph node.  2: 6 x 4 x 10 mm lymph node.     Level 3: No lymph nodes identified.  Level 4: No lymph nodes identified.  Level 5: No lymph nodes identified.  Level 6: No lymph nodes identified.  Level 7: No lymph nodes identified.     Thyroid: Thyroid nodules partially visualized, including the 8 mm nodule in the inferior right lobe, and an 8 mm hypoechoic left inferior nodule.      IMPRESSION:  1.  Lymph nodes as described without malignant features.  2.  Partially visualized thyroid nodules, not significantly changed from 3/16/2017.      9/27/2017: TSH 0.13, FT4 1.17, Tg 0.23, TgAb<0.4  2/9/2018: TSH 0.08, free T4 1.52, Tg 0.19, TgAb<0.4  3/6/2018: TSH 0.12, free T4 1.87  4/3/2018: TSH 0.39, free T4 1.36  6/12/2018: TSH 0.94, free T4 1.08  8/10/2018: TSH 2.3, FT4 1.29, Tg 0.33, TgAb <0.4  10/16/2018: TSH 1.16, FT4 1.16  1/17/2019: FT4 1.33  5/10/2019: TSH 0.5, FT4 1.34, Tg 0.19, TgAb<0.4    US neck 8/10/2018  Lymph nodes are measured bilaterally with measurements given in transverse, AP, and craniocaudal dimensions as follows:     Right:  Level 1: No lymphadenopathy  Level 2: No lymphadenopathy  Level 3: 8 x 3 x 10 mm lymph node with a preserved fatty hilum  Level 4: There are 3 lymph nodes with preserved fatty hilum measuring 1.1 x 0.6 x 1.2 cm, 0.8 x 0.3 x 0.9 cm, and 0.8 x 0.4 x 0.8 cm  Level 5: No lymphadenopathy  Level 6: No lymphadenopathy     Left:  Level 1:  No lymphadenopathy  Level 2: There are 2 lymph nodes without definite fatty rom which measure 1.1 x 0.4 x 1.0 cm and 0.6 x 0.5 x 0.9 cm  Level 3: No lymphadenopathy  Level 4: 0.7 x 0.3 x 2.1 cm lymph node with a preserved fatty hilum  Level 5: No lymphadenopathy  Level 6: No lymphadenopathy                                                                   IMPRESSION: Soft tissue neck ultrasound with lymph node measurements as described above. No pathologically enlarged or morphologically suspicious lymph nodes. There are two left level 2 lymph nodes which do not have a definite fatty hilum, although they are similar in size and appearance from 7/3/2017 and were not hypermetabolic on PET/CT 7/19/2017.    US neck 5/13/2019  Lymph nodes are measured bilaterally with measurements given in craniocaudal, transverse and AP dimensions as follows:     Right:  Level 1: No lymphadenopathy  Level 2: No lymphadenopathy  Level 3: No lymphadenopathy  Level 4: 1.4 x 0.6 x 2.2 cm lymph node with a fatty hilum (previously 0.9 x 0.3 x 1.0 cm), 1.2 x 0.6 x 1.2 cm lymph node with a fatty hilum  (previously 0.8 x 0.4 x 0.9 cm), and a 1.0 x 0.6 x 0.8 cm lymph node with a fatty hilum  Level 5: No lymphadenopathy  Level 6: No lymphadenopathy  Level 7: No lymphadenopathy     Left:  Level 1: No lymphadenopathy  Level 2: 1.3 x 0.8 x 1.5 cm lymph node with a small fatty hilum, and a 6 x 5 x 11 mm lymph node with a small fatty hilum  Level 3: No lymphadenopathy  Level 4: 3 x 7 x 12 mm lymph node with a small fatty hilum  Level 5: No lymphadenopathy  Level 6: No lymphadenopathy  Level 7: No lymphadenopathy                                                                    IMPRESSION: Soft tissue neck ultrasound with lymph node measurements as described above. Most notably, two of the right level 4 lymph nodes have enlarged from 8/10/2018.     Ultrasound neck 2/12/2020  Lymph nodes are measured bilaterally with measurements given in craniocaudal,  transverse and AP dimensions as follows:     Right:  Level 1: No lymphadenopathy  Level 2: No lymphadenopathy  Level 3: No lymphadenopathy  Level 4: 1.1 x 0.9 x 2.3 cm  lymph node with a fatty hilum (previously 1.4 x 0.6 x 2.2 cm), 0.9 x 0.9 x 1.3 cm lymph node with a fatty hilum (previously 1.2 x 0.6 x 1.2 cm), and a 0.8 x 0.8 x 0.8 cm lymph node  with a fatty hilum (previously 1.0 x 0.6 x 0.8 cm)  Level 5: No lymphadenopathy  Level 6: No lymphadenopathy  Level 7: No lymphadenopathy     Left:  Level 1: No lymphadenopathy  Level 2: 1.2 x 0.6 x 1.2 cm ovoid lymph node, previously 1.3 x 0.8 x 1.5 cm. 8 x 6 x 9 mm lymph node with echogenic hilum, previously 6 x 5 x 11 mm. Additional 1.3 x 0.7 x 2 cm lymph node with echogenic hilum. Overall these are favored as reactive in nature.  Level 3: No lymphadenopathy  Level 4: 3 x 8 x 12 mm lymph node with a small fatty hilum, unchanged  Level 5: No lymphadenopathy  Level 6: No lymphadenopathy  Level 7: No lymphadenopathy     Overall no new lymphadenopathy in the neck. No worrisome cervical lymph nodes to suggest metastatic lymphadenopathy.                                                                   IMPRESSION: Cervical lymph nodes as described above. No enlarging or new lymphadenopathy to suggest metastatic lymphadenopathy.    ASSESSMENT:    Cricket Chen is a 42 years old male with hx of post liver and kidney transplant in 5/2016, posttransplant lymphoproliferative disorder, atrial flutter status post direct current cardioversion, hypertension who is here for follow up post total thyroidectomy    1) multifocal micro PTC: he was noted to have thyroid cancer during evaluation of diffuse large B cell lymphoma. He was initially noted for hypermetabolic activity at right thyroid gland with SUV max of 12.7 from PET scan. Subsequent FNA of right thyroid nodule showed positive for thyroid cancer. He underwent total thyroidectomy without complications.  His prognosis is  favorable given micro PTC, negative margin and no lymphovascular invasion.  Follow up yearly US neck showed no suspicious lesion  Lab in 5/2021 showed TSH 0.16 and FT4 1.52, Tg 0.36, TgAb<0.4.   I will check TSH, FT4, Tg, TgAb, today    2) postoperative hypothyroidism: clinically euthyroid.  Currently on levothyroxine 137 mcg daily. Will check lab today as per #1.    PLAN:   - lab for TSH, FT4, Tg, TgAb  - RTC 1 year     Start 1120 am  End 1125 am  VDO duration 5 minutes    External notes/medical records independently reviewed, labs and imaging independently reviewed, medical management and tests to be discussed/communicated to patient.    Time: I spent 15  minutes spent on the date of the encounter preparing to see patient (including chart review and preparation), obtaining and or reviewing additional medical history, performing a physical exam and evaluation, documenting clinical information in the electronic health record, independently interpreting results, communicating results to the patient and coordinating care.      Zuleyma Gray MD  Division of Diabetes and Endocrinology  Department of Medicine  [phone]

## 2022-10-19 PROBLEM — N18.2 CKD (CHRONIC KIDNEY DISEASE) STAGE 2, GFR 60-89 ML/MIN: Status: ACTIVE | Noted: 2021-05-11

## 2022-10-19 PROBLEM — I48.0 PAROXYSMAL ATRIAL FIBRILLATION (H): Status: ACTIVE | Noted: 2018-03-07

## 2022-10-19 NOTE — PATIENT INSTRUCTIONS
Patient Recommendations:  - No new recommendations at this time.    Transplant Patient Information  Your Post Transplant Coordinator is: Afsaneh Spring  Your Liver Transplant Coordinator is: Elvia Baires  For non urgent items, we encourage you to contact your coordinator/care team online via "[employer]  You and your care team can also contact your transplant coordinator Monday - Friday, 8am - 5pm at [phone] (Option 2 to reach the coordinator or Option 4 to schedule an appointment).  After hours for urgent matters, please call [facility] at [phone].

## 2022-10-20 ENCOUNTER — TELEPHONE (OUTPATIENT)
Dept: TRANSPLANT | Facility: CLINIC | Age: 42
End: 2022-10-20

## 2022-10-20 DIAGNOSIS — N25.81 SECONDARY RENAL HYPERPARATHYROIDISM (H): ICD-10-CM

## 2022-10-20 DIAGNOSIS — D84.9 IMMUNOSUPPRESSION (H): ICD-10-CM

## 2022-10-20 DIAGNOSIS — Z48.298 AFTERCARE FOLLOWING ORGAN TRANSPLANT: ICD-10-CM

## 2022-10-20 DIAGNOSIS — Z79.60 LONG-TERM USE OF IMMUNOSUPPRESSANT MEDICATION: Primary | ICD-10-CM

## 2022-10-20 DIAGNOSIS — Z94.4 LIVER REPLACED BY TRANSPLANT (H): ICD-10-CM

## 2022-10-20 DIAGNOSIS — Z79.899 OTHER LONG TERM (CURRENT) DRUG THERAPY: ICD-10-CM

## 2022-10-20 NOTE — TELEPHONE ENCOUNTER
Call placed to patient. No answer. Voice message left instructing patient to complete requested labs. Order sent

## 2022-10-20 NOTE — TELEPHONE ENCOUNTER
----- Message from Jake Sidhu MD sent at 10/19/2022 10:54 AM CDT -----  Regarding: Clinic visit  Please check the following labs: Vitamin D, HbA1c, and UPC.

## 2022-10-24 DIAGNOSIS — I48.92 ATRIAL FLUTTER, UNSPECIFIED TYPE (H): ICD-10-CM

## 2022-10-26 NOTE — TELEPHONE ENCOUNTER
ELIQUIS 5  MG     Last Written Prescription Date:  2/1/22  Last Fill Quantity: 180,   # refills: 2  Last Office Visit : 11/23/21  Future Office visit:  11/25/22  Routing refill request to provider for review/approval because:  ABN  CR      Creatinine   Date Value Ref Range Status   09/06/2022 1.43 (H) 0.66 - 1.25 mg/dL Final   07/06/2021 1.27 (H) 0.66 - 1.25 mg/dL Final

## 2022-11-01 ENCOUNTER — LAB (OUTPATIENT)
Dept: LAB | Facility: CLINIC | Age: 42
End: 2022-11-01
Payer: COMMERCIAL

## 2022-11-01 DIAGNOSIS — Z48.298 AFTERCARE FOLLOWING ORGAN TRANSPLANT: ICD-10-CM

## 2022-11-01 DIAGNOSIS — Z79.899 OTHER LONG TERM (CURRENT) DRUG THERAPY: ICD-10-CM

## 2022-11-01 DIAGNOSIS — Z13.220 SCREENING FOR HYPERLIPIDEMIA: ICD-10-CM

## 2022-11-01 DIAGNOSIS — E55.9 VITAMIN D DEFICIENCY, UNSPECIFIED: ICD-10-CM

## 2022-11-01 DIAGNOSIS — D84.9 IMMUNOSUPPRESSION (H): ICD-10-CM

## 2022-11-01 DIAGNOSIS — N25.81 SECONDARY RENAL HYPERPARATHYROIDISM (H): ICD-10-CM

## 2022-11-01 DIAGNOSIS — Z79.60 LONG-TERM USE OF IMMUNOSUPPRESSANT MEDICATION: ICD-10-CM

## 2022-11-01 DIAGNOSIS — C73 PAPILLARY THYROID CARCINOMA (H): ICD-10-CM

## 2022-11-01 DIAGNOSIS — E89.0 POSTOPERATIVE HYPOTHYROIDISM: ICD-10-CM

## 2022-11-01 DIAGNOSIS — N18.31 STAGE 3A CHRONIC KIDNEY DISEASE (H): ICD-10-CM

## 2022-11-01 DIAGNOSIS — Z94.4 LIVER REPLACED BY TRANSPLANT (H): ICD-10-CM

## 2022-11-01 LAB
ALBUMIN MFR UR ELPH: <6 MG/DL
CREAT UR-MCNC: 66.3 MG/DL
DEPRECATED CALCIDIOL+CALCIFEROL SERPL-MC: 22 UG/L (ref 20–75)
HBA1C MFR BLD: 5.2 % (ref 0–5.6)
PROT/CREAT 24H UR: NORMAL MG/G{CREAT}

## 2022-11-01 PROCEDURE — 83036 HEMOGLOBIN GLYCOSYLATED A1C: CPT

## 2022-11-01 PROCEDURE — 36415 COLL VENOUS BLD VENIPUNCTURE: CPT

## 2022-11-01 PROCEDURE — 84156 ASSAY OF PROTEIN URINE: CPT

## 2022-11-01 PROCEDURE — 84439 ASSAY OF FREE THYROXINE: CPT

## 2022-11-01 PROCEDURE — 82306 VITAMIN D 25 HYDROXY: CPT

## 2022-11-01 PROCEDURE — 80061 LIPID PANEL: CPT

## 2022-11-01 PROCEDURE — 84432 ASSAY OF THYROGLOBULIN: CPT | Mod: 90

## 2022-11-01 PROCEDURE — 99000 SPECIMEN HANDLING OFFICE-LAB: CPT

## 2022-11-01 PROCEDURE — 86800 THYROGLOBULIN ANTIBODY: CPT | Mod: 90

## 2022-11-01 PROCEDURE — 84443 ASSAY THYROID STIM HORMONE: CPT

## 2022-11-01 PROCEDURE — 82043 UR ALBUMIN QUANTITATIVE: CPT

## 2022-11-01 RX ORDER — APIXABAN 5 MG/1
TABLET, FILM COATED ORAL
Qty: 180 TABLET | Refills: 2 | Status: SHIPPED | OUTPATIENT
Start: 2022-11-01 | End: 2023-06-23

## 2022-11-02 LAB
CHOLEST SERPL-MCNC: 172 MG/DL
CREAT UR-MCNC: 70 MG/DL
FASTING STATUS PATIENT QL REPORTED: ABNORMAL
HDLC SERPL-MCNC: 39 MG/DL
LDLC SERPL CALC-MCNC: 111 MG/DL
MICROALBUMIN UR-MCNC: 7 MG/L
MICROALBUMIN/CREAT UR: 10 MG/G CR (ref 0–17)
NONHDLC SERPL-MCNC: 133 MG/DL
T4 FREE SERPL-MCNC: 1.27 NG/DL (ref 0.76–1.46)
TRIGL SERPL-MCNC: 108 MG/DL
TSH SERPL DL<=0.005 MIU/L-ACNC: 0.33 MU/L (ref 0.4–4)

## 2022-11-04 LAB — SCANNED LAB RESULT: NORMAL

## 2022-11-25 ENCOUNTER — OFFICE VISIT (OUTPATIENT)
Dept: CARDIOLOGY | Facility: CLINIC | Age: 42
End: 2022-11-25
Payer: COMMERCIAL

## 2022-11-25 ENCOUNTER — ANCILLARY PROCEDURE (OUTPATIENT)
Dept: CARDIOLOGY | Facility: CLINIC | Age: 42
End: 2022-11-25
Attending: INTERNAL MEDICINE
Payer: COMMERCIAL

## 2022-11-25 VITALS
WEIGHT: 195.8 LBS | OXYGEN SATURATION: 97 % | SYSTOLIC BLOOD PRESSURE: 121 MMHG | DIASTOLIC BLOOD PRESSURE: 81 MMHG | HEIGHT: 71 IN | BODY MASS INDEX: 27.41 KG/M2 | HEART RATE: 71 BPM

## 2022-11-25 VITALS
BODY MASS INDEX: 27.41 KG/M2 | WEIGHT: 195.8 LBS | HEART RATE: 71 BPM | DIASTOLIC BLOOD PRESSURE: 81 MMHG | SYSTOLIC BLOOD PRESSURE: 121 MMHG | OXYGEN SATURATION: 97 % | HEIGHT: 71 IN

## 2022-11-25 DIAGNOSIS — I48.92 ATRIAL FLUTTER, UNSPECIFIED TYPE (H): ICD-10-CM

## 2022-11-25 DIAGNOSIS — Q20.3 COMPLETE TRANSPOSITION OF GREAT VESSELS: ICD-10-CM

## 2022-11-25 DIAGNOSIS — Z94.0 KIDNEY TRANSPLANTED: ICD-10-CM

## 2022-11-25 DIAGNOSIS — Q20.3 D-TGA (DEXTRO-TRANSPOSITION OF GREAT ARTERIES): ICD-10-CM

## 2022-11-25 LAB
ATRIAL RATE - MUSE: 71 BPM
DIASTOLIC BLOOD PRESSURE - MUSE: NORMAL MMHG
INTERPRETATION ECG - MUSE: NORMAL
P AXIS - MUSE: 99 DEGREES
PR INTERVAL - MUSE: 170 MS
QRS DURATION - MUSE: 130 MS
QT - MUSE: 414 MS
QTC - MUSE: 449 MS
R AXIS - MUSE: 122 DEGREES
SYSTOLIC BLOOD PRESSURE - MUSE: NORMAL MMHG
T AXIS - MUSE: 31 DEGREES
VENTRICULAR RATE- MUSE: 71 BPM

## 2022-11-25 PROCEDURE — 93005 ELECTROCARDIOGRAM TRACING: CPT

## 2022-11-25 PROCEDURE — 99215 OFFICE O/P EST HI 40 MIN: CPT | Mod: 25

## 2022-11-25 PROCEDURE — G0463 HOSPITAL OUTPT CLINIC VISIT: HCPCS | Mod: 25

## 2022-11-25 PROCEDURE — 93325 DOPPLER ECHO COLOR FLOW MAPG: CPT | Performed by: PEDIATRICS

## 2022-11-25 PROCEDURE — 93320 DOPPLER ECHO COMPLETE: CPT | Performed by: PEDIATRICS

## 2022-11-25 PROCEDURE — 99215 OFFICE O/P EST HI 40 MIN: CPT | Mod: 25 | Performed by: INTERNAL MEDICINE

## 2022-11-25 PROCEDURE — 93303 ECHO TRANSTHORACIC: CPT | Performed by: PEDIATRICS

## 2022-11-25 ASSESSMENT — PAIN SCALES - GENERAL
PAINLEVEL: NO PAIN (0)
PAINLEVEL: NO PAIN (0)

## 2022-11-25 NOTE — PROGRESS NOTES
[facility]    Assessment/Recommendations   Assessment/Plan:    Mr. Chen is a 42 year old male who has a past medical history significant for dTGA s/p modified Shoemaker operation and stitch closure of small VSD , AFL s/p DCCV and s/p ablation 2000, liver and kidney transplant , post transplant lymphoproliferative disorder, papillary thyroid cancer s/p thyroidectomy, prior ETOH abuse (sober since ), anxiety, and depression. He presents today for follow up.     Atrial Flutter:   We discussed in detail with the patient management/treatment options for AFL includin. Stroke Prophylaxis:  CHADSVASC=+HF, +HTN  2, corresponding to a 2.2% annual stroke / systemic emolism event rate. indicating need for long term oral anticoagulation. He also has congenital anatomy which we would recommend anticoagulation. He is appropriately on Eliquis. No bleeding isues.    2. Rate Control: Continue Metoprolol XL 25 mg daily.   3. Rhythm Control: Cardioversion, Antiarrhythmics and/or ablation are options for rhythm control. He has previously been on Sotalol. Not currently on AATs. S/p DCCV 3/7/18  with history of Ablation at OSH in . Organized A.flutter is highly amenable to ablation procedure. An ablation can be considered for any recurrence.   4. Risk Factor Management: maintain tight BP control, and JUAN evaluation.       dTGA systemic EF 27%, NYHA I-II:  1. ACEi/ARB: Continue Lisiopril.  2. BB: Continue Metoprolol XL 25 mg daily.   3. Aldosterone antagonist: not required.  4. SCD prophylaxis: discussed that systemic EF read 27% and is still low despite medical therapy, and relatively unchanged visually from prior imaging. We discussed an ICD both with patient and Dr Torres who [facility]. We discussed S-ICD versus TV-ICD. An S-ICD has obvious advantages but cannot monitor atrial arrhythmias or pace. AFter further discussion, the patient wants to pursue TV-ICD.   We  discussed with the patient the rationale for ICD placement, alternative therapies,  technical aspects of the surgical procedure, risks/benefits of therapy and need for long-term follow-up in the Device Clinic.  An educational handout regarding ICD therapy was reviewed with the patient.  All question/concerns were addressed. After our discussion, the patient verbalized understanding and wishes to proceed with ICD implant. We will pursue ICD implant after performing CMR and CPX We might hold off on ICD if RVEF by CMR comes back higher than what we are seeing on TTE.    5. Fluid status: euvolemic on exam     Follow up after ICD implant.       History of Present Illness/Subjective    Mr. Cricket Chen is a 42 year old male who comes in today for EP follow-up of [facility] dTGA and AFL.    Mr. Chen is a 42 year old male who has a past medical history significant for dTGA s/p modified Shoemaker operation and stitch closure of small VSD 1981, AFL s/p DCCV and s/p ablation 8/2000, liver and kidney transplant 2016, post transplant lymphoproliferative disorder, papillary thyroid cancer s/p thyroidectomy, prior ETOH abuse (sober since 2014), anxiety, and depression. He presents today for follow up.      He presented to [facility] on 3/6/18 with shortness of breath and chest flutterings that started the day prior. He recalled it felt similar to when he was in AFL in the past. In the [facility], he was found to be in AFL. He was subsequently admitted. He was started on Eliquis and arranged for NEELIMA/DCCV. He had successful DCCV 3/7/18 and he was discharged. He states that he had AFL in the past. He believes he had a DCCV around age 7. He had also been maintained on Sotalol. He also reports a previous ablation at an OSH in 8/2000 (records not available to us at this time). He did well without AFL issues until 3/2018 when he had recurrence. Most recent echo from 3/30/18 showed systemic EF 35-40%, mild TR, and no evidence of baffle  leaks/obstructions. A CMRI from OSH from 3/2017 showed no baffle  Obstruction and systemic EF 33%. He saw [facility] EP clinic on 4/13/2018. He was doing well. He had not noted any further arrhythmias since his DCCV. He followed up in EP clinic in 7/2018 and was doing well without recurrent AFL.      EP Visit 3/2019: He presents today for follow up. He presented to [facility] on 1/16/19 reporting some shortness of breath and palpitations. He was in sinus on presentation to ER. He was discharged on a zio patch monitor. Zio patch from 1/17/19-1/31/19 showed SR with rare ectopy and 7 NSVT up to 10 beats. 3 symptom activations showed sinus. An echo from 1/2019 showed moderately depressed Rv function, no obvious baffle obstructions or leaks. He reports feeling well and no recurrent palpitations. He denies any chest pain/pressures, dizziness, lightheadedness, worsening shortness of breath, leg/ankle swelling, PND, orthopnea, palpitations, or syncopal symptoms. Presenting 12 lead ECG shows SB Vent Rate 59 bpm,  ms,  ms, QTc 429 ms. Current cardiac medications include: Toprol XL, Eliquis, Lisinopril, and Norvasc.      EP Visit 3/13/20: He presents today for follow up. He reports feeling well. He denies any chest pain/pressures, dizziness, lightheadedness, worsening shortness of breath, leg/ankle swelling, PND, orthopnea, palpitations, or syncopal symptoms. Recent CMRI/MRA showed systemic EF 37%, normal baffles without obstruction, fatty metaplasia of sub-pulmonic ventricle and narrowing of intra-hepatic IVC which are unchanged. A zio patch from June 2019 showed SR with 7 NSVT episodes. Presenting 12 lead ECG shows NSR IRBBB Vent Rate 65 bpm,  ms,  ms, QTc 443 ms. Current cardiac medications include: Toprol XL, Eliquis, Lisinopril, and Norvasc.     EP Visit 11/23/21: He presents today for follow up. He reports feeling very well.  He is stable with no new symptoms, NYHA class II.  Echo today was reviewed and  shows mod to sev systemic ventricle dysfunction but also considered no change from last echoA zio patch monitor from 8/16/21-8/30/21 showed 11 NSVT up to 10 beats and rare isolated ectopy with adequate HR distribution in sinus. Current cardiac medications include: Toprol XL, Eliquis, Lisinopril, and Norvasc.     He presents today for follow up. He reports feeling mild palpitations, not significant for patient, no presyncope, less than last year. functionally is very good, except if he goes for jog or run that he has to stop, does 30 min walks every other day with no limitations  No bleeding issues with Eliquis.. Echo today shows Moderate to severe right ventricular dilatation with moderate to severely decreased systolic function (no significant change compared to last echocardiogram). Mild tricuspid valve insufficiency. The pulmonary venous baffle is unobstructed; the systemic venous baffle is unobstructed (mean gradient of 3 mmHg). Qualitatively, normal left ventricular systolic function. Mild pulmonary valve insufficiency. Presenting 12 lead ECG shows NSR with PVC Vent Rate 70 bpm,  ms,  ms, QTc 450 ms. Current cardiac medications include: Toprol XL, Eliquis, Lisinopril.        Cardiographics (Personally Reviewed) :   TTE 11/25/22:  Patient after atrial switch operation for complete transposition of the great arteries. Technically difficult study due to poor acoustic windows. Moderate to severe right ventricular dilatation with moderate to severely decreased systolic function (no significant change compared to last echocardiogram). Mild tricuspid valve insufficiency. The pulmonary venous baffle is unobstructed; the systemic venous baffle is unobstructed (mean gradient of 3 mmHg). Qualitatively, normal left ventricular systolic function. Mild pulmonary valve insufficiency    TTE 11/23/2021:  Patient after atrial switch operation for complete transposition of the great arteries. Technically difficult  study due to poor acoustic windows. Moderate right ventricular dilatation and hypertrophy with moderate to severely decreased systolic function (no significant change compared to last echocardiogram). Mild tricuspid valve insufficiency. The pulmonary venous baffle is unobstructed; the systmic venous baffle was not well seen.Qualitatively, normal left ventricular systolic function. Mild pulmonary valve insufficiency.    3/30/18 ECHOCARDIOGRAM:   Patient after atrial switch operation for complete transposition of the great  arteries. Technically difficult study due to poor acoustic windows. No baffle  obstruction or leaks seen. There is moderate right ventricular enlargement.  Single plane right ventricular EF 35-40 %. Normal left ventricular systolic  function. No outflow obstruction. Mild tricuspid regurgitation.     3/2017 CMRI (OSH REPORT):  1. Transposition of the great arteries with native atrioventricular concordance and ventricular arterial discordance with the aorta anterior to the pulmonary artery (D-transposition of the great arteries).  2. Status post interatrial baffle with the superior vena cava and inferior vena cava baffled to the left (subpulmonic) ventricle without obstruction.  There is no evidence of a baffle leak.    3. Systemic right ventricle:  a. The right ventricle is hypertrophied.  b. Decreased systolic function with an ejection fraction of 33%.  c. Severe dilation of the right ventricle with an end-diastolic volume of 208 mL/m  (previously 188 mL/m ) and end-systolic volume of 140 mL/m  (previous 123 mL/m ).  4. The right ventricle connects with the anterior aorta.  The coronary artery origins are usual for transposition.  Left-sided aortic arch with measurements above.  5. The pulmonary venous baffle to the right ventricle is widely patent.  6. The subpulmonary left ventricle has normal systolic function with decreased myocardial mass.  The left ventricle connects to the pulmonary artery,  which is posterior to the aorta.  7. No significant change from previous cardiac MRI of 9/4/2012 except an increased size in the indexed right ventricular diastolic and systolic Volumes.    3/4/2020 CMRIMRA:  CONCLUSIONS: d-TGA with atrial baffle repair.  The systemic ventricle is moderately enlarged with severely reduced function, EF 27%. The subpulmonic ventricle is small in size with normal function, EF 57%. Normal baffles without any obstruction, thrombus or leakage. There is mild-moderate regurgitation of the systemic atrioventricular (tricuspid) valve. There is no residual intracardiac shunting (Qp/Qs 1.0).   The subpulmonic ventricle exhibits extensive fatty metaplasia in the mid and apical segments of unclear significance. Narrowing of intra-hepatic IVC of unclear significance.  Compared to the prior examination dated 03/09/2017 the systemic ventricle's function appears similar visually. The fatty metaplasia of sub-pulmonic ventricle and narrowing of intra-hepatic IVC  is unchanged from prior examinations.     1/2019 ECHO:  ------CONCLUSIONS------  Patient after atrial switch operation for complete transposition of the great arteries. Technically difficult study due to poor acoustic windows. There is moderate right ventricular enlargement. The right ventricular function is qualitatively moderately depressed. Qualitivley the right ventricle function is unchanged form the 3/30/2018 echo. Mild (1+) tricuspid valve insufficiency.No obvious baffle obstruction or leaks seen. Qualitatively normal left ventricular systolic function. Mild (1+) pulmonary valve insufficiency.Limited visualization of the LPA suggests narrowing. RPA not seen.       Physical Examination   There were no vitals taken for this visit.  Wt Readings from Last 3 Encounters:   10/03/22 87.2 kg (192 lb 3.2 oz)   03/16/22 92.8 kg (204 lb 9.6 oz)   11/23/21 94.3 kg (208 lb)     General Appearance:   Alert, well-appearing and in no acute distress.    HEENT: Atraumatic, normocephalic. PERRL.  MMM.   Chest/Lungs:   Respirations unlabored.  Lungs are clear to auscultation.   Cardiovascular:   Regular rate and rhythm.    Abdomen:  Soft, nontender, nondistended.   Extremities: No cyanosis or clubbing. No edema.    Musculoskeletal: Moves all extremities.  Gait normal.   Skin: Warm, dry, intact.    Neurologic: Mood and affect are appropriate.  Alert and oriented to person, place, time, and situation.          Medications  Allergies   Current Outpatient Medications   Medication Sig Dispense Refill     ELIQUIS ANTICOAGULANT 5 MG tablet TAKE ONE TABLET BY MOUTH TWICE A  tablet 2     levothyroxine (SYNTHROID/LEVOTHROID) 137 MCG tablet Take 1 tablet (137 mcg) by mouth daily 90 tablet 3     lisinopril (ZESTRIL) 20 MG tablet Take 1 tablet (20 mg) by mouth daily 90 tablet 3     LORazepam (ATIVAN) 1 MG tablet Take 1 tablet (1 mg) by mouth daily as needed (severe anxiety/panic/sleep) 15 tablet 0     magnesium oxide (MAG-OX) 400 MG tablet Take 1 tablet (400 mg) by mouth 2 times daily 120 tablet 11     metoprolol succinate ER (TOPROL-XL) 25 MG 24 hr tablet Take 1 tablet (25 mg) by mouth 2 times daily 180 tablet 3     mycophenolate (GENERIC EQUIVALENT) 250 MG capsule Take 2 capsules (500 mg) by mouth 2 times daily 120 capsule 11     sulfamethoxazole-trimethoprim (BACTRIM) 400-80 MG tablet Take 1 tablet by mouth daily 30 tablet 11     tacrolimus (GENERIC EQUIVALENT) 0.5 MG capsule TAKE ONE CAPSULE BY MOUTH EVERY MORNING (TOTAL DOSE IS 1.5MG IN  THE MORNING AND 1MG IN THE EVENING) 90 capsule 3     tacrolimus (GENERIC EQUIVALENT) 1 MG capsule TAKE ONE CAPSULE BY MOUTH TWICE A DAY (TOTAL DOSE IS 1.5MG IN THE MORNING AND 1MG IN THE EVENING) 180 capsule 3     traZODone (DESYREL) 50 MG tablet Take 1 tablet (50 mg) by mouth at bedtime as needed, may repeat once for sleep 180 tablet 0    Allergies   Allergen Reactions     Hydromorphone Itching         Lab Results (Personally  Reviewed)    Chemistry/lipid CBC Cardiac Enzymes/BNP/TSH/INR   Lab Results   Component Value Date    BUN 17 09/06/2022     09/06/2022    CO2 25 09/06/2022     Creatinine   Date Value Ref Range Status   09/06/2022 1.43 (H) 0.66 - 1.25 mg/dL Final   07/06/2021 1.27 (H) 0.66 - 1.25 mg/dL Final       Lab Results   Component Value Date    CHOL 172 11/01/2022    HDL 39 (L) 11/01/2022     (H) 11/01/2022      Lab Results   Component Value Date    WBC 4.3 09/06/2022    HGB 15.4 09/06/2022    HCT 45.7 09/06/2022    MCV 92 09/06/2022     09/06/2022    Lab Results   Component Value Date    TSH 0.33 (L) 11/01/2022    INR 1.13 10/30/2019        The patient states understanding and is agreeable with the plan.   Jaylen George MD [facility]  Cardiology - Electrophysiology    Total time spent on patient visit, reviewing notes, imaging, labs, orders, and completing necessary documentation: 45 minutes.  >50% of visit spent on counseling patient and/or coordination of care.

## 2022-11-25 NOTE — PATIENT INSTRUCTIONS
You were seen today in the Adult Congenital and Cardiovascular Genetics Clinic at the [facility].    Cardiology Providers you saw during your visit:  Rah Torres MD and Jaylen George MD    Diagnosis: dTGA    Results:  Rah Torres MD and Jaylen George MD reviewed the results of your EKG and echo testing today in clinic.    Recommendations for you:    Once cardiac MRI and CPX are complete, Dr. George will review the results and we will get you scheduled for an ICD      General Cardiac Recommendations:  Continue to eat a heart healthy, low salt diet.  Continue to get 20-30 minutes of aerobic activity, 4-5 days per week.  Examples of aerobic activity include walking, running, swimming, cycling, etc.  Continue to observe good oral hygiene, with regular dental visits.      SBE prophylaxis:   Yes____  No__X__    If YES is checked, follow the recommendations outlined below:  Take antibiotic(s) prior to recommended dental procedures and procedures on the respiratory tract or with infected skin, muscle or bones. SBE prophylaxis is not needed for routine GI and  procedures (ie. Colonoscopy or vaginal delivery)  Observe good oral hygiene daily, as advised by your dentist. Get regular professional dental care.  Keep cuts clean.  Infections should be treated promptly.  Symptoms of Infective Endocarditis could include: fever lasting more than 4-5 days or a recurrent fever that initially resolves but returns within 1-2 days)      Exercise restrictions:   Yes__X__  No____         If yes, list restrictions:  Must be allowed to rest if fatigued or SOB      Work restrictions:  Yes____  No_X___         If yes, list restrictions:    FASTING CHOLESTEROL was checked in the last 5 years YES_X__  NO___ (2021)  If no, please follow up with your primary care physician. You should have a cholesterol screening every 5 years.      Follow-up: Follow up with Dr. George and Dr. Torres after ICD placement.     If you have questions or  concerns please contact us at:    Analisa Cerna, MPH, RN, BSN   [facility]erkley Pyle (Scheduling)  Nurse Care Coordinator     Clinic   Adult Congenital and CV Genetics   Adult Congenital and CV Genetic  [facility]  (P) [phone]     (P) [phone]  [email or web address]  (F) [phone]        For after hours urgent needs, call [phone] and ask to speak to the Adult Congenital Physician on call.  Mention Job Code 0401.    For emergencies call 911.    [facility]   [facility]in[facility] and [facility]  Mail Code [id number]  [address] [place] [address]

## 2022-11-25 NOTE — PROGRESS NOTES
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 IS seeing Dr. George from EP today as well.     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 He has had atrial tachycardia with cardioversion in 2018 and ablation in 2000.  He has reduced systemic function.  s/p liver and kidney transplant for EtOH abuse in 2016, post-transplant lymphoproliferative disorder, papillary thyroid cancer s/p thryoidectomy clear at 5 years who presents today for ongoing evaluation and management.  Pt reports that he has had no significant illnesses or hospitalizations since his last visit. He can walk the dogs a couple of blocks, but not much more than that. He denies any chest pain or pressure, sob/lucia, orthopnea, pnd, syncope/presyncope, hanna suzette. Slight  Decrease in exercise tolerance over time.   He denies any problems with his medications. Sober for 8 years.        PMH: Cardiac Hx as above  Past Medical History:   Diagnosis Date     Alcohol abuse     Last drink in Mid-April 2014     Anemia in ESRD (end-stage renal disease) (H)      Anxiety 2008     Atrial flutter (H) 2017     Cirrhosis (H)     S/P liver transplant     Depression      History of blood transfusion      History of transposition of great vessels     atrial switch at age 8 months old     Hypertension      Liver transplant recipient (H)     2016     Papillary thyroid carcinoma (H)      Pneumonia 11-15-14     Renal transplant recipient     2016     Varices, esophageal (H)        Past Surgical History:   Procedure Laterality Date     ANESTHESIA CARDIOVERSION N/A 3/7/2018    Procedure: ANESTHESIA CARDIOVERSION;  Cardioversion;  Surgeon:  GENERIC ANESTHESIA PROVIDER;  Location: [facility]     BENCH LIVER N/A 5/10/2016    Procedure: BENCH LIVER;  Surgeon: Ricky Deshpande MD;  [facility]: [facility]     BIOPSY LYMPH NODE CERVICAL Right 1/26/2017    Procedure: BIOPSY LYMPH NODE CERVICAL;  Surgeon: Beka Soto MD;  [facility]: [facility]     CARDIAC SURGERY  9-10-80     CREATE FISTULA ARTERIOVENOUS UPPER EXTREMITY Right 9/16/2014    Procedure: CREATE FISTULA ARTERIOVENOUS UPPER EXTREMITY;  Surgeon: Padmaja Eaton MD;  Location: [facility]     CV RIGHT HEART CATH MEASUREMENTS RECORDED N/A 4/19/2019    Procedure: CV RIGHT HEART CATH;  Surgeon: Sabi Wiggins MD;  Location:  HEART CARDIAC CATH LAB     CYSTOSCOPY, REMOVE STENT(S), COMBINED Right 6/22/2016    Procedure: COMBINED CYSTOSCOPY, REMOVE STENT(S);  Surgeon: Ricky Deshpande MD;  [facility]     EMBOLECTOMY UPPER EXTREMITY Right 8/17/2018    Procedure: EMBOLECTOMY UPPER EXTREMITY;  Repair Right Upper Arm Pseudo Aneursym ;  Surgeon: John Payton MD;  Location: [facility]     ENT SURGERY       ESOPHAGOSCOPY, GASTROSCOPY, DUODENOSCOPY (EGD), COMBINED  5/30/2014    Procedure: COMBINED ESOPHAGOSCOPY, GASTROSCOPY, DUODENOSCOPY (EGD);  Surgeon: Guillaume Bautista MD;  Location:  GI     ESOPHAGOSCOPY, GASTROSCOPY, DUODENOSCOPY (EGD), COMBINED  9/30/14     ESOPHAGOSCOPY, GASTROSCOPY, DUODENOSCOPY (EGD), COMBINED Left 3/12/2015    Procedure: COMBINED ESOPHAGOSCOPY, GASTROSCOPY, DUODENOSCOPY (EGD);  Surgeon: Laureen Galvan MD;  Location:  GI     ESOPHAGOSCOPY, GASTROSCOPY, DUODENOSCOPY (EGD), COMBINED N/A 4/21/2016    Procedure: COMBINED ESOPHAGOSCOPY, GASTROSCOPY, DUODENOSCOPY (EGD);  Surgeon: Laureen Galvan MD;  Location:  GI     ESOPHAGOSCOPY, GASTROSCOPY, DUODENOSCOPY (EGD), COMBINED N/A 7/21/2016    Procedure: COMBINED ESOPHAGOSCOPY, GASTROSCOPY, DUODENOSCOPY (EGD);  Surgeon: Laureen Galvan MD;  Location: [facility]     HC OR CATH ABLATION NON-CARDIAC  ENDOVASCULAR      SVT     INSERT PORT VASCULAR ACCESS N/A 4/3/2017    Procedure: INSERT PORT VASCULAR ACCESS;  Surgeon: Rajendra Jacques PA-C;  [facility]     IR PORT REMOVAL LEFT  2019     LIGATE FISTULA ARTERIOVENOUS UPPER EXTREMITY Right 2017    Procedure: LIGATE FISTULA ARTERIOVENOUS UPPER EXTREMITY;  Revision of Right Arm Arteriovenous Fistula ;  Surgeon: Padmaja Eaton MD;  [facility]     PERCUTANEOUS BIOPSY KIDNEY Right 2018    Procedure: PERCUTANEOUS BIOPSY KIDNEY;  Right Kidney Biopsy;  Surgeon: Yuval Khan MD;  [facility]     PERCUTANEOUS BIOPSY KIDNEY Right 10/30/2019    Procedure: Right Side Kidney Biopsy;  Surgeon: Jake Sidhu MD;  [facility]     PICC INSERTION  14    PICC line placement 14; Removal 2014     REMOVE PORT VASCULAR ACCESS Right 2019    Procedure: Right chest Port Removal;  Surgeon: Erasmo Ziegler PA-C;  Location:  OR     THORACIC SURGERY      Transposition great arteries, repaired at 8 months     THYROIDECTOMY Right 2017    Procedure: THYROIDECTOMY;  Bilateral Total Thyroidectomy ;  Surgeon: Beka Soto MD;  [facility]     TRANSPLANT KIDNEY RECIPIENT  DONOR  5/10/2016    Procedure: TRANSPLANT KIDNEY RECIPIENT  DONOR;  Surgeon: Ricky Deshpande MD;  [facility]     TRANSPLANT LIVER RECIPIENT  DONOR N/A 5/10/2016    Procedure: TRANSPLANT LIVER RECIPIENT  DONOR;  Surgeon: Ricky Deshpande MD;  [facility]   As above, including HPI    Family History   Problem Relation Age of Onset     No Known Problems Brother      Arthritis Father      Hypertension Father      Hypertension Mother      Anxiety Disorder Mother      Hyperlipidemia Mother      Hypertension Sister      Anxiety Disorder Sister      No Known Problems Maternal Grandmother      No Known Problems Maternal Grandfather      No Known Problems Paternal Grandmother      No Known  "Problems Brother      No Known Problems Paternal Grandfather      Alcohol/Drug No family hx of      Gastrointestinal Disease No family hx of         no fam hx of liver disease or liver cancer       Meds  ELIQUIS ANTICOAGULANT 5 MG tablet, TAKE ONE TABLET BY MOUTH TWICE A DAY  levothyroxine (SYNTHROID/LEVOTHROID) 137 MCG tablet, Take 1 tablet (137 mcg) by mouth daily  lisinopril (ZESTRIL) 20 MG tablet, Take 1 tablet (20 mg) by mouth daily  LORazepam (ATIVAN) 1 MG tablet, Take 1 tablet (1 mg) by mouth daily as needed (severe anxiety/panic/sleep)  magnesium oxide (MAG-OX) 400 MG tablet, Take 1 tablet (400 mg) by mouth 2 times daily  metoprolol succinate ER (TOPROL-XL) 25 MG 24 hr tablet, Take 1 tablet (25 mg) by mouth 2 times daily  mycophenolate (GENERIC EQUIVALENT) 250 MG capsule, Take 2 capsules (500 mg) by mouth 2 times daily  sulfamethoxazole-trimethoprim (BACTRIM) 400-80 MG tablet, Take 1 tablet by mouth daily  tacrolimus (GENERIC EQUIVALENT) 0.5 MG capsule, TAKE ONE CAPSULE BY MOUTH EVERY MORNING (TOTAL DOSE IS 1.5MG IN  THE MORNING AND 1MG IN THE EVENING)  tacrolimus (GENERIC EQUIVALENT) 1 MG capsule, TAKE ONE CAPSULE BY MOUTH TWICE A DAY (TOTAL DOSE IS 1.5MG IN THE MORNING AND 1MG IN THE EVENING)  traZODone (DESYREL) 50 MG tablet, Take 1 tablet (50 mg) by mouth at bedtime as needed, may repeat once for sleep    No current facility-administered medications on file prior to visit.      Allergies   Allergen Reactions     Hydromorphone Itching       /81 (BP Location: Right arm, Patient Position: Chair, Cuff Size: Adult Regular)   Pulse 71   Ht 1.801 m (5' 10.91\")   Wt 88.8 kg (195 lb 12.8 oz)   SpO2 97%   BMI 27.38 kg/m    General: appears comfortable, alert and articulate  Head: normocephalic, atraumatic  Eyes: anicteric sclera, EOMI  Neck: no adenopathy or masses, 2+ carotids without bruits  Orophyarynx: moist mucosa, no lesions, dentition intact  Heart: regular, normal S1, loud S2, no murmur, gallop, " or rub,   Lungs: clear, no rales or wheezing  Abdomen: soft, non-tender, bowel sounds present, no hepatomegaly  Extremities: no clubbing, cyanosis or edema  Neurological: normal speech and affect, no gross motor deficits    Echo today:  Moderate to severely reduced function of systemic ventricle, no baffle obstruction.     Recent Results (from the past 4320 hour(s))   Echo Congenital Adult    Narrative    [id number]  [id number]  [id number]  [id number]^AP                                                               Study ID: [id number]                                                 [place] [address]                                                Phone: [phone]                                Pediatric Echocardiogram  ______________________________________________________________________________  Name: IJEOMA DOBSON  Study Date: 2022 10:00 AM                      Patient Location: [facility]  MRN: [id number]                                      Age: 42 yrs  : 1980                                      BP: 126/85 mmHg  Gender: Male                                         HR: 79  Patient Class: Outpatient                            Height: 69.5 in  Ordering Provider: KAROLINE YODER              Weight: 195 lb  Referring Provider: KAROLINE YODER             BSA: 2.1 m2  Performed By: Gracia Corado  Report approved by: Shaan Wolff MD  Reason For Study: Complete transposition of great vessels, Atrial flutter,  unspeci  ______________________________________________________________________________  ##### CONCLUSIONS #####  Patient after atrial switch operation for complete transposition of the great  arteries. Technically difficult study due to poor acoustic windows.  Moderate  to severe right ventricular dilatation with moderate to severely decreased  systolic function (no significant change compared to last echocardiogram).  Mild tricuspid valve insufficiency. The pulmonary venous baffle is  unobstructed; the systemic venous baffle is unobstructed (mean gradient of 3  mmHg). Qualitatively, normal left ventricular systolic function. Mild  pulmonary valve insufficiency.  ______________________________________________________________________________  Technical information:  A complete two dimensional, MMODE, spectral and color Doppler transthoracic  echocardiogram is performed. Images are obtained from parasternal, apical,  subcostal and suprasternal notch views. Technically difficult study due to  poor acoustic windows. The subcostal views were difficult to obtain and are  suboptimal in quality. Prior echocardiogram available for comparison. ECG  tracing shows regular rhythm.     Segmental Anatomy:  There is normal atrial arrangement, with concordant atrioventricular  connections, discordant ventriculoarterial connections, and aorta anterior and  rightward to the pulmonary artery.     The pulmonary veins are not well visualized.     Atria and atrial septum:  Post atrial switch procedure. The pulmonary venous baffle appears  unobstructed. The systemic venous baffle appears unobstructed.     Atrioventricular valves:  The tricuspid valve is normal in appearance and motion. Mild (1+) tricuspid  valve insufficiency. The mitral valve is normal in appearance and motion.  There is no mitral valve insufficiency.     Ventricles and Ventricular Septum:  There is moderate to severe right ventricular enlargement. The right  ventricular function is qualitatively moderate to severely depressed. There is  mild to moderate right ventricular hypertrophy. Normal left ventricular  systolic function.     Outflow tracts:  Complete transposition of the great arteries (D-TGA). There is  unobstructed  flow through the right ventricular outflow tract. The pulmonary valve and  aortic valve have normal appearance and motion. There is normal flow across  the pulmonary valve. Mild (1+) pulmonary valve insufficiency. There is  unobstructed flow through the left ventricular outflow tract. Tricuspid aortic  valve with normal appearance and motion. There is normal flow across the  aortic valve.     Great arteries:  The main pulmonary artery has normal appearance. There is unobstructed flow in  the main pulmonary artery. There is unobstructed flow in both branch pulmonary  arteries. The ascending aorta was not visualized. The aortic arch appears  normal. There is unobstructed antegrade flow in the ascending, transverse  arch, descending thoracic and abdominal aorta.     Coronaries:  The coronary arteries are not evaluated.     Effusions, catheters, cannulas and leads:  No pericardial effusion.     Doppler Measurements & Calculations  Ao V2 max: 64.9 cm/sec                   LV V1 max: 57.4 cm/sec  Ao max P.8 mmHg                      LV V1 max P.3 mmHg                                           LV dP/dt: 673.0 mmHg/s  PA V2 max: 73.3 cm/sec                   RV V1 max: 62.0 cm/sec  PA max P.1 mmHg                      RV V1 max P.5 mmHg  LPA max susana: 53.4 cm/sec  LPA max P.1 mmHg  RPA max susana: 97.0 cm/sec  RPA max PG: 3.8 mmHg     desc Ao max susana: 81.0 cm/sec             MPA max susana: 110.1 cm/sec  desc Ao max P.6 mmHg                 MPA max P.9 mmHg     Report approved by: Kit Erazo 2022 01:48 PM               Cardiac MRI 3/9/2017  Great arteries with native atrioventricular   concordance and ventricular arterial discordance with the aorta anterior   to the pulmonary artery (D-transposition of the great arteries).  2. Status post interatrial baffle with the superior vena cava and inferior   vena cava baffled to the left (subpulmonic) ventricle without obstruction.     There is no evidence of a baffle leak.    3. Systemic right ventricle:  a. The right ventricle is hypertrophied.  b. Decreased systolic function with an ejection fraction of 33%.  c. Severe dilation of the right ventricle with an end-diastolic volume of   208 mL/m  (previously 188 mL/m ) and end-systolic volume of 140 mL/m    (previous 123 mL/m ).  4. The right ventricle connects with the anterior aorta.  The coronary   artery origins are usual for transposition.  Left-sided aortic arch with   measurements above.  5. The pulmonary venous baffle to the right ventricle is widely patent.  6. The subpulmonary left ventricle has normal systolic function with   decreased myocardial mass.  The left ventricle connects to the pulmonary   artery, which is posterior to the aorta.  7. No significant change from previous cardiac MRI of 9/4/2012 except an   increased size in the indexed right ventricular diastolic and systolic   Volumes    Echo (3/30/18):   Patient after atrial switch operation for complete transposition of the great arteries. Technically difficult study due to poor acoustic windows. No baffle obstruction or leaks seen. There is moderate right ventricular enlargement. Single plane right ventricular EF 35-40 %. Normal left ventricular systolic function. No outflow obstruction. Mild tricuspid regurgitation.    EKG (3/30:18): Sinus rhythm, RAD, RBBB, prominent RV forces with repolarization abnormality.      Echo 1/17/19  Patient after atrial switch operation for complete transposition of the great  arteries. Technically difficult study due to poor acoustic windows. There is  moderate right ventricular enlargement. The right ventricular function is  qualitatively moderately depressed. Qualitivley the right ventricle function  is unchanged form the 3/30/2018 echo. Mild (1+) tricuspid valve insufficiency.  No obvious baffle obstruction or leaks seen. Qualitatively normal left  ventricular systolic function. Mild (1+)  pulmonary valve insufficiency.  Limited visualization of the LPA suggests narrowing. RPA not seen.    Mercedes Feb 2019      CMR Report 3-   SUMMARY  Clinical history:39-year old male with a history of d-TGA (with baffle repair), small VSD (closed with a  stitch) and combined liver/renal transplant. He is known to have depressed systemic ventricle function. CMR  to evaluate RV function.   Comparison CMR: 03/09/2017 ([facility])  SITUS: There is normal situs The liver is in the right upper quadrant and a single spleen in the left  upper quadrant.   CAVAE: There is a single SVC and a single IVC.both drain unobstructed into the sub-pulmonic ventricle via  baffle.  SVC baffle measures 1.6 x 1.0 cm. IVC baffle measures 1.5 x 1.4 cm. The intrahepatic segment of  the native IVC measures 13.9 x 5.3 mm at its narrowest point. There does not appear to be any baffle  obstruction or leakage.   PULMONARY VEINS: There are four pulmonary veins with two left-sided veins and two right-sided veins.They  drain in an unobstructed fashion via baffle to the systemic ventricle.   ATRIOVENTRICULAR CONNECTION: The atrioventricular connection is discordant. There is mild-moderate  regurgitation of the systemic atrioventricular (tricuspid) valve. There is mild FARHANA of the sub-pulmonic AV  valve (mitral) without septal contact or obstruction.   VENTRICLES: There is levocardia. The systemic (morphologic right) ventricle is moderately enlarged with  severe hypertrophy. The global systolic function of the systemic ventricle is severely reduced. The  systemic ventricle's EF is 27%. The subpulmonic (morphologic left) ventricle is smallï cavity size. The  subpulmonic ventricle exhibits wall thinning and a pattern of fatty metaplasia with enhancement on LGE  imaging in the mid-anterolateral, mid anteroseptal, apical septal, and apical lateral segments. The global  systolic function of the subpulmonic ventricle is normal. The  subpulmonic ventricle's EF is 57%. There are  no regional wall motion abnormalities. The VSD repair is visualized in the basal septum and is intact.  There is no evidence of intracardiac shunting (Qp/Qs 1.0).  VENTRICULOARTERIAL CONNECTION: The ventriculoarterial connection is discordant.There is no significant  valvular disease.   AORTA AND SUPRA-AORTIC VESSELS: The aortic arch is left-sided with normal branching of the vessels.   PULMONARY ARTERY: The main pulmonary artery is mildly enlarged. The branch pulmonary arteries are normal in  size.   CONCLUSIONS: d-TGA with atrial baffle repair.  The systemic ventricle is moderately enlarged with severely  reduced function, EF 27%. The subpulmonic ventricle is small in size with normal function, EF 57%. Normal  baffles without any obstruction, thrombus or leakage. There is mild-moderate regurgitation of the systemic  atrioventricular (tricuspid) valve. There is no residual intracardiac shunting (Qp/Qs 1.0).   The subpulmonic ventricle exhibits extensive fatty metaplasia in the mid and apical segments of unclear  significance. Narrowing of intra-hepatic IVC of unclear significance.   Compared to the prior examination dated 03/09/2017 the systemic ventricle's function appears similar  visually. The fatty metaplasia of sub-pulmonic ventricle and narrowing of intra-hepatic IVC  is unchanged  from prior examinations.     Zio Sept 2021      Echo 11/23/21  Patient after atrial switch operation for complete transposition of the great  arteries. Technically difficult study due to poor acoustic windows. Moderate  right ventricular dilatation and hypertrophy with moderate to severely  decreased systolic function (no significant change compared to last  echocardiogram). Mild tricuspid valve insufficiency. The pulmonary venous  baffle is unobstructed; the systmic venous baffle was not well seen.  Qualitatively, normal left ventricular systolic function. Mild pulmonary  valve  Insufficiency.      Assessment and Plan:  43 yo M with history of d - transposition of the great vessels s/p baffle repair and suture closure of small VSD in 1981, atrial flutter s/p ablation, with depressed systemic RV function.  s/p liver and kidney transplant for EtOH abuse, post-transplant lymphoproliferative disorder, papillary thyroid cancer in remission  s/p thryoidectomy presents for ongoing evaluation and management.    1.  TGA s/p atrial switch with depressed systemic ventricular function:  Reduced exercise tolerance.  In regard to heart failure medical regimen, his systemic ventricle is an anatomic right ventricle and there is no proven therapies to improve morbidity or mortality in right ventricular failure and especially systemic right ventrular failure. On lisinopril 20 mg daily for BP control.   Will continue current dose of metoprolol xl.  Given reduced exercise tolerance would consider starting spironolactone 25 mg daily and Jardiance 10 mg daily. Encouraged gradual increase in exercise as tolerated.     2.  H/o atrial flutter s/p ablation:  Continue apixaban and metoprolol xl.  Seen by congenital EP today.  Please see their note for detailed findings and plan. Would like assessment of systemic ventricular function to determine need for ICD placement.     Recommendations:   CPX  CMR for ventricular function and baffle anatomy (no concerns about gadolinium per renal transplant physician  Continue eliquis and current medications.  wll discuss ICD with Dr. George post testing.        Follow-up:  Post testing and/or ICD implantation.     A total of 45 minutes were spent in personal review of testing, including echo, review of documented history and interval events in chart,  face to face visit with discussion of findings and plan, care coordination and documentation.     Rah Torres M.D.   of Pediatrics  Pediatric and Adult Congenital Cardiology  [facility]  [facility]  Pediatric Cardiology Office [phone]  Adult Congenital Cardiology Triage and Scheduling [phone]    Answers for HPI/ROS submitted by the patient on 11/23/2022  General Symptoms: No  Skin Symptoms: No  HENT Symptoms: No  EYE SYMPTOMS: No  HEART SYMPTOMS: No  LUNG SYMPTOMS: No  INTESTINAL SYMPTOMS: No  URINARY SYMPTOMS: No  REPRODUCTIVE SYMPTOMS: No  SKELETAL SYMPTOMS: No  BLOOD SYMPTOMS: No  NERVOUS SYSTEM SYMPTOMS: No  MENTAL HEALTH SYMPTOMS: No

## 2022-11-25 NOTE — LETTER
11/25/2022      RE: Cricket Chen  [address]  [facility] [address]       Dear Colleague,    Thank you for the opportunity to participate in the care of your patient, Cricket Chen, at the [facility] at [facility]. Please see a copy of my visit note below.    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 IS seeing Dr. George from EP today as well.     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 He has had atrial tachycardia with cardioversion in 2018 and ablation in 2000.  He has reduced systemic function.  s/p liver and kidney transplant for EtOH abuse in 2016, post-transplant lymphoproliferative disorder, papillary thyroid cancer s/p thryoidectomy clear at 5 years who presents today for ongoing evaluation and management.  Pt reports that he has had no significant illnesses or hospitalizations since his last visit. He can walk the dogs a couple of blocks, but not much more than that. He denies any chest pain or pressure, sob/lucia, orthopnea, pnd, syncope/presyncope, hanna suzette. Slight  Decrease in exercise tolerance over time.   He denies any problems with his medications. Sober for 8 years.        PMH: Cardiac Hx as above  Past Medical History:   Diagnosis Date     Alcohol abuse     Last drink in Mid-April 2014     Anemia in ESRD (end-stage renal disease) (H)      Anxiety 2008     Atrial flutter (H) 2017     Cirrhosis (H)     S/P liver transplant     Depression      History of blood transfusion      History of transposition of great vessels     atrial switch at age 8 months old      Hypertension      Liver transplant recipient (H)     2016     Papillary thyroid carcinoma (H)      Pneumonia 11-15-14     Renal transplant recipient     2016     Varices, esophageal (H)        Past Surgical History:   Procedure Laterality Date     ANESTHESIA CARDIOVERSION N/A 3/7/2018    Procedure: ANESTHESIA CARDIOVERSION;  Cardioversion;  Surgeon: GENERIC ANESTHESIA PROVIDER;  Location: [facility] OR     BENCH LIVER N/A 5/10/2016    Procedure: BENCH LIVER;  Surgeon: Ricky Deshpande MD;  [facility]: [facility] OR     BIOPSY LYMPH NODE CERVICAL Right 1/26/2017    Procedure: BIOPSY LYMPH NODE CERVICAL;  Surgeon: Beka Soto MD;  Location: [facility] OR     CARDIAC SURGERY  9-10-80     CREATE FISTULA ARTERIOVENOUS UPPER EXTREMITY Right 9/16/2014    Procedure: CREATE FISTULA ARTERIOVENOUS UPPER EXTREMITY;  Surgeon: Padmaja Eaton MD;  Location: [facility] OR     CV RIGHT HEART CATH MEASUREMENTS RECORDED N/A 4/19/2019    Procedure: CV RIGHT HEART CATH;  Surgeon: Sabi Wiggins MD;  Location:  HEART CARDIAC CATH LAB     CYSTOSCOPY, REMOVE STENT(S), COMBINED Right 6/22/2016    Procedure: COMBINED CYSTOSCOPY, REMOVE STENT(S);  Surgeon: Ricky Deshpande MD;  [facility]: [facility]     EMBOLECTOMY UPPER EXTREMITY Right 8/17/2018    Procedure: EMBOLECTOMY UPPER EXTREMITY;  Repair Right Upper Arm Pseudo Aneursym ;  Surgeon: John Payton MD;  Location: [facility] OR     ENT SURGERY       ESOPHAGOSCOPY, GASTROSCOPY, DUODENOSCOPY (EGD), COMBINED  5/30/2014    Procedure: COMBINED ESOPHAGOSCOPY, GASTROSCOPY, DUODENOSCOPY (EGD);  Surgeon: Guillaume Bautista MD;  Location:  GI     ESOPHAGOSCOPY, GASTROSCOPY, DUODENOSCOPY (EGD), COMBINED  9/30/14     ESOPHAGOSCOPY, GASTROSCOPY, DUODENOSCOPY (EGD), COMBINED Left 3/12/2015    Procedure: COMBINED ESOPHAGOSCOPY, GASTROSCOPY, DUODENOSCOPY (EGD);  Surgeon: Laureen Galvan MD;  Location:  GI     ESOPHAGOSCOPY, GASTROSCOPY, DUODENOSCOPY (EGD), COMBINED N/A 4/21/2016    Procedure: COMBINED  ESOPHAGOSCOPY, GASTROSCOPY, DUODENOSCOPY (EGD);  Surgeon: Laureen Galvan MD;  [facility]: [facility] GI     ESOPHAGOSCOPY, GASTROSCOPY, DUODENOSCOPY (EGD), COMBINED N/A 2016    Procedure: COMBINED ESOPHAGOSCOPY, GASTROSCOPY, DUODENOSCOPY (EGD);  Surgeon: Laureen Galvan MD;  Location: [facility] GI     HC OR CATH ABLATION NON-CARDIAC ENDOVASCULAR      SVT     INSERT PORT VASCULAR ACCESS N/A 4/3/2017    Procedure: INSERT PORT VASCULAR ACCESS;  Surgeon: Rajendra Jacques PA-C;  [facility]     IR PORT REMOVAL LEFT  2019     LIGATE FISTULA ARTERIOVENOUS UPPER EXTREMITY Right 2017    Procedure: LIGATE FISTULA ARTERIOVENOUS UPPER EXTREMITY;  Revision of Right Arm Arteriovenous Fistula ;  Surgeon: Padmaja Eaton MD;  Location: [facility]     PERCUTANEOUS BIOPSY KIDNEY Right 2018    Procedure: PERCUTANEOUS BIOPSY KIDNEY;  Right Kidney Biopsy;  Surgeon: Yuval Khan MD;  [facility]     PERCUTANEOUS BIOPSY KIDNEY Right 10/30/2019    Procedure: Right Side Kidney Biopsy;  Surgeon: Jake Sidhu MD;  [facility]     PICC INSERTION  14    PICC line placement 14; Removal 2014     REMOVE PORT VASCULAR ACCESS Right 2019    Procedure: Right chest Port Removal;  Surgeon: Erasmo Ziegler PA-C;  Location:  OR     THORACIC SURGERY  1980    Transposition great arteries, repaired at 8 months     THYROIDECTOMY Right 2017    Procedure: THYROIDECTOMY;  Bilateral Total Thyroidectomy ;  Surgeon: Beka Soto MD;  [facility]: [facility]     TRANSPLANT KIDNEY RECIPIENT  DONOR  5/10/2016    Procedure: TRANSPLANT KIDNEY RECIPIENT  DONOR;  Surgeon: Ricky Deshpande MD;  [facility]     TRANSPLANT LIVER RECIPIENT  DONOR N/A 5/10/2016    Procedure: TRANSPLANT LIVER RECIPIENT  DONOR;  Surgeon: Ricky Deshpande MD;  Location: [facility] OR   As above, including HPI    Family History   Problem Relation Age of Onset     No  Known Problems Brother      Arthritis Father      Hypertension Father      Hypertension Mother      Anxiety Disorder Mother      Hyperlipidemia Mother      Hypertension Sister      Anxiety Disorder Sister      No Known Problems Maternal Grandmother      No Known Problems Maternal Grandfather      No Known Problems Paternal Grandmother      No Known Problems Brother      No Known Problems Paternal Grandfather      Alcohol/Drug No family hx of      Gastrointestinal Disease No family hx of         no fam hx of liver disease or liver cancer       Meds  ELIQUIS ANTICOAGULANT 5 MG tablet, TAKE ONE TABLET BY MOUTH TWICE A DAY  levothyroxine (SYNTHROID/LEVOTHROID) 137 MCG tablet, Take 1 tablet (137 mcg) by mouth daily  lisinopril (ZESTRIL) 20 MG tablet, Take 1 tablet (20 mg) by mouth daily  LORazepam (ATIVAN) 1 MG tablet, Take 1 tablet (1 mg) by mouth daily as needed (severe anxiety/panic/sleep)  magnesium oxide (MAG-OX) 400 MG tablet, Take 1 tablet (400 mg) by mouth 2 times daily  metoprolol succinate ER (TOPROL-XL) 25 MG 24 hr tablet, Take 1 tablet (25 mg) by mouth 2 times daily  mycophenolate (GENERIC EQUIVALENT) 250 MG capsule, Take 2 capsules (500 mg) by mouth 2 times daily  sulfamethoxazole-trimethoprim (BACTRIM) 400-80 MG tablet, Take 1 tablet by mouth daily  tacrolimus (GENERIC EQUIVALENT) 0.5 MG capsule, TAKE ONE CAPSULE BY MOUTH EVERY MORNING (TOTAL DOSE IS 1.5MG IN  THE MORNING AND 1MG IN THE EVENING)  tacrolimus (GENERIC EQUIVALENT) 1 MG capsule, TAKE ONE CAPSULE BY MOUTH TWICE A DAY (TOTAL DOSE IS 1.5MG IN THE MORNING AND 1MG IN THE EVENING)  traZODone (DESYREL) 50 MG tablet, Take 1 tablet (50 mg) by mouth at bedtime as needed, may repeat once for sleep    No current facility-administered medications on file prior to visit.      Allergies   Allergen Reactions     Hydromorphone Itching       /81 (BP Location: Right arm, Patient Position: Chair, Cuff Size: Adult Regular)   Pulse 71   Ht 1.801 m (5'  "10.91\")   Wt 88.8 kg (195 lb 12.8 oz)   SpO2 97%   BMI 27.38 kg/m    General: appears comfortable, alert and articulate  Head: normocephalic, atraumatic  Eyes: anicteric sclera, EOMI  Neck: no adenopathy or masses, 2+ carotids without bruits  Orophyarynx: moist mucosa, no lesions, dentition intact  Heart: regular, normal S1, loud S2, no murmur, gallop, or rub,   Lungs: clear, no rales or wheezing  Abdomen: soft, non-tender, bowel sounds present, no hepatomegaly  Extremities: no clubbing, cyanosis or edema  Neurological: normal speech and affect, no gross motor deficits    Echo today:  Moderate to severely reduced function of systemic ventricle, no baffle obstruction.     Recent Results (from the past 4320 hour(s))   Echo Congenital Adult    Narrative    [id number]  [id number]  [id number]  [id number]^[id number]^AP                                                               Study ID: [id number]                                                 [facility] [place] [address]                                                Phone: [phone]                                Pediatric Echocardiogram  ______________________________________________________________________________  Name: IJEOMA DOBSON  Study Date: 2022 10:00 AM                      Patient [facility]: [facility]  MRN: [id number]                                      Age: 42 yrs  : 1980                                      BP: 126/85 mmHg  Gender: Male                                         HR: 79  Patient Class: Outpatient                            Height: 69.5 in  Ordering Provider: KAROLINE YODER              Weight: 195 lb  Referring Provider: KAROLINE YODER             BSA: 2.1 m2  Performed By: Gracia Corado  Report approved by: " Shaan Wolff MD  Reason For Study: Complete transposition of great vessels, Atrial flutter,  unspeci  ______________________________________________________________________________  ##### CONCLUSIONS #####  Patient after atrial switch operation for complete transposition of the great  arteries. Technically difficult study due to poor acoustic windows. Moderate  to severe right ventricular dilatation with moderate to severely decreased  systolic function (no significant change compared to last echocardiogram).  Mild tricuspid valve insufficiency. The pulmonary venous baffle is  unobstructed; the systemic venous baffle is unobstructed (mean gradient of 3  mmHg). Qualitatively, normal left ventricular systolic function. Mild  pulmonary valve insufficiency.  ______________________________________________________________________________  Technical information:  A complete two dimensional, MMODE, spectral and color Doppler transthoracic  echocardiogram is performed. Images are obtained from parasternal, apical,  subcostal and suprasternal notch views. Technically difficult study due to  poor acoustic windows. The subcostal views were difficult to obtain and are  suboptimal in quality. Prior echocardiogram available for comparison. ECG  tracing shows regular rhythm.     Segmental Anatomy:  There is normal atrial arrangement, with concordant atrioventricular  connections, discordant ventriculoarterial connections, and aorta anterior and  rightward to the pulmonary artery.     The pulmonary veins are not well visualized.     Atria and atrial septum:  Post atrial switch procedure. The pulmonary venous baffle appears  unobstructed. The systemic venous baffle appears unobstructed.     Atrioventricular valves:  The tricuspid valve is normal in appearance and motion. Mild (1+) tricuspid  valve insufficiency. The mitral valve is normal in appearance and motion.  There is no mitral valve insufficiency.     Ventricles and  Ventricular Septum:  There is moderate to severe right ventricular enlargement. The right  ventricular function is qualitatively moderate to severely depressed. There is  mild to moderate right ventricular hypertrophy. Normal left ventricular  systolic function.     Outflow tracts:  Complete transposition of the great arteries (D-TGA). There is unobstructed  flow through the right ventricular outflow tract. The pulmonary valve and  aortic valve have normal appearance and motion. There is normal flow across  the pulmonary valve. Mild (1+) pulmonary valve insufficiency. There is  unobstructed flow through the left ventricular outflow tract. Tricuspid aortic  valve with normal appearance and motion. There is normal flow across the  aortic valve.     Great arteries:  The main pulmonary artery has normal appearance. There is unobstructed flow in  the main pulmonary artery. There is unobstructed flow in both branch pulmonary  arteries. The ascending aorta was not visualized. The aortic arch appears  normal. There is unobstructed antegrade flow in the ascending, transverse  arch, descending thoracic and abdominal aorta.     Coronaries:  The coronary arteries are not evaluated.     Effusions, catheters, cannulas and leads:  No pericardial effusion.     Doppler Measurements & Calculations  Ao V2 max: 64.9 cm/sec                   LV V1 max: 57.4 cm/sec  Ao max P.8 mmHg                      LV V1 max P.3 mmHg                                           LV dP/dt: 673.0 mmHg/s  PA V2 max: 73.3 cm/sec                   RV V1 max: 62.0 cm/sec  PA max P.1 mmHg                      RV V1 max P.5 mmHg  LPA max susana: 53.4 cm/sec  LPA max P.1 mmHg  RPA max susana: 97.0 cm/sec  RPA max PG: 3.8 mmHg     desc Ao max susana: 81.0 cm/sec             MPA max susana: 110.1 cm/sec  desc Ao max P.6 mmHg                 MPA max P.9 mmHg     Report approved by: Kit Erazo 2022 01:48 PM               Cardiac MRI  3/9/2017  Great arteries with native atrioventricular   concordance and ventricular arterial discordance with the aorta anterior   to the pulmonary artery (D-transposition of the great arteries).  2. Status post interatrial baffle with the superior vena cava and inferior   vena cava baffled to the left (subpulmonic) ventricle without obstruction.    There is no evidence of a baffle leak.    3. Systemic right ventricle:  a. The right ventricle is hypertrophied.  b. Decreased systolic function with an ejection fraction of 33%.  c. Severe dilation of the right ventricle with an end-diastolic volume of   208 mL/m  (previously 188 mL/m ) and end-systolic volume of 140 mL/m    (previous 123 mL/m ).  4. The right ventricle connects with the anterior aorta.  The coronary   artery origins are usual for transposition.  Left-sided aortic arch with   measurements above.  5. The pulmonary venous baffle to the right ventricle is widely patent.  6. The subpulmonary left ventricle has normal systolic function with   decreased myocardial mass.  The left ventricle connects to the pulmonary   artery, which is posterior to the aorta.  7. No significant change from previous cardiac MRI of 9/4/2012 except an   increased size in the indexed right ventricular diastolic and systolic   Volumes    Echo (3/30/18):   Patient after atrial switch operation for complete transposition of the great arteries. Technically difficult study due to poor acoustic windows. No baffle obstruction or leaks seen. There is moderate right ventricular enlargement. Single plane right ventricular EF 35-40 %. Normal left ventricular systolic function. No outflow obstruction. Mild tricuspid regurgitation.    EKG (3/30:18): Sinus rhythm, RAD, RBBB, prominent RV forces with repolarization abnormality.      Echo 1/17/19  Patient after atrial switch operation for complete transposition of the great  arteries. Technically difficult study due to poor acoustic windows. There  is  moderate right ventricular enlargement. The right ventricular function is  qualitatively moderately depressed. Qualitivley the right ventricle function  is unchanged form the 3/30/2018 echo. Mild (1+) tricuspid valve insufficiency.  No obvious baffle obstruction or leaks seen. Qualitatively normal left  ventricular systolic function. Mild (1+) pulmonary valve insufficiency.  Limited visualization of the LPA suggests narrowing. RPA not seen.    Mercedes Feb 2019      CMR Report 3-   SUMMARY  Clinical history:39-year old male with a history of d-TGA (with baffle repair), small VSD (closed with a  stitch) and combined liver/renal transplant. He is known to have depressed systemic ventricle function. CMR  to evaluate RV function.   Comparison CMR: 03/09/2017 ([facility])  SITUS: There is normal situs The liver is in the right upper quadrant and a single spleen in the left  upper quadrant.   CAVAE: There is a single SVC and a single IVC.both drain unobstructed into the sub-pulmonic ventricle via  baffle.  SVC baffle measures 1.6 x 1.0 cm. IVC baffle measures 1.5 x 1.4 cm. The intrahepatic segment of  the native IVC measures 13.9 x 5.3 mm at its narrowest point. There does not appear to be any baffle  obstruction or leakage.   PULMONARY VEINS: There are four pulmonary veins with two left-sided veins and two right-sided veins.They  drain in an unobstructed fashion via baffle to the systemic ventricle.   ATRIOVENTRICULAR CONNECTION: The atrioventricular connection is discordant. There is mild-moderate  regurgitation of the systemic atrioventricular (tricuspid) valve. There is mild FARHANA of the sub-pulmonic AV  valve (mitral) without septal contact or obstruction.   VENTRICLES: There is levocardia. The systemic (morphologic right) ventricle is moderately enlarged with  severe hypertrophy. The global systolic function of the systemic ventricle is severely reduced. The  systemic ventricle's EF is 27%. The  subpulmonic (morphologic left) ventricle is smallï cavity size. The  subpulmonic ventricle exhibits wall thinning and a pattern of fatty metaplasia with enhancement on LGE  imaging in the mid-anterolateral, mid anteroseptal, apical septal, and apical lateral segments. The global  systolic function of the subpulmonic ventricle is normal. The subpulmonic ventricle's EF is 57%. There are  no regional wall motion abnormalities. The VSD repair is visualized in the basal septum and is intact.  There is no evidence of intracardiac shunting (Qp/Qs 1.0).  VENTRICULOARTERIAL CONNECTION: The ventriculoarterial connection is discordant.There is no significant  valvular disease.   AORTA AND SUPRA-AORTIC VESSELS: The aortic arch is left-sided with normal branching of the vessels.   PULMONARY ARTERY: The main pulmonary artery is mildly enlarged. The branch pulmonary arteries are normal in  size.   CONCLUSIONS: d-TGA with atrial baffle repair.  The systemic ventricle is moderately enlarged with severely  reduced function, EF 27%. The subpulmonic ventricle is small in size with normal function, EF 57%. Normal  baffles without any obstruction, thrombus or leakage. There is mild-moderate regurgitation of the systemic  atrioventricular (tricuspid) valve. There is no residual intracardiac shunting (Qp/Qs 1.0).   The subpulmonic ventricle exhibits extensive fatty metaplasia in the mid and apical segments of unclear  significance. Narrowing of intra-hepatic IVC of unclear significance.   Compared to the prior examination dated 03/09/2017 the systemic ventricle's function appears similar  visually. The fatty metaplasia of sub-pulmonic ventricle and narrowing of intra-hepatic IVC  is unchanged  from prior examinations.     Zio Sept 2021      Echo 11/23/21  Patient after atrial switch operation for complete transposition of the great  arteries. Technically difficult study due to poor acoustic windows. Moderate  right ventricular dilatation  and hypertrophy with moderate to severely  decreased systolic function (no significant change compared to last  echocardiogram). Mild tricuspid valve insufficiency. The pulmonary venous  baffle is unobstructed; the systmic venous baffle was not well seen.  Qualitatively, normal left ventricular systolic function. Mild pulmonary valve  Insufficiency.      Assessment and Plan:  41 yo M with history of d - transposition of the great vessels s/p baffle repair and suture closure of small VSD in 1981, atrial flutter s/p ablation, with depressed systemic RV function.  s/p liver and kidney transplant for EtOH abuse, post-transplant lymphoproliferative disorder, papillary thyroid cancer in remission  s/p thryoidectomy presents for ongoing evaluation and management.    1.  TGA s/p atrial switch with depressed systemic ventricular function:  Reduced exercise tolerance.  In regard to heart failure medical regimen, his systemic ventricle is an anatomic right ventricle and there is no proven therapies to improve morbidity or mortality in right ventricular failure and especially systemic right ventrular failure. On lisinopril 20 mg daily for BP control.   Will continue current dose of metoprolol xl.  Given reduced exercise tolerance would consider starting spironolactone 25 mg daily and Jardiance 10 mg daily. Encouraged gradual increase in exercise as tolerated.     2.  H/o atrial flutter s/p ablation:  Continue apixaban and metoprolol xl.  Seen by congenital EP today.  Please see their note for detailed findings and plan. Would like assessment of systemic ventricular function to determine need for ICD placement.     Recommendations:   CPX  CMR for ventricular function and baffle anatomy (no concerns about gadolinium per renal transplant physician  Continue eliquis and current medications.  wll discuss ICD with Dr. George post testing.        Follow-up:  Post testing and/or ICD implantation.     A total of 45 minutes were spent in  personal review of testing, including echo, review of documented history and interval events in chart,  face to face visit with discussion of findings and plan, care coordination and documentation.     Rah Torres M.D.   of Pediatrics  Pediatric and Adult Congenital Cardiology  [facility]  Pediatric Cardiology Office [phone]  Adult Congenital Cardiology Triage and Scheduling [phone]

## 2022-11-25 NOTE — PATIENT INSTRUCTIONS
You were seen today in the Adult Congenital and Cardiovascular Genetics Clinic at the [facility].    Cardiology Providers you saw during your visit:  Rah Torres MD and Jaylen George MD    Diagnosis: dTGA    Results:  Rah Torres MD and Jaylen George MD reviewed the results of your EKG and echo testing today in clinic.    Recommendations for you:    Once cardiac MRI and CPX are complete, Dr. George will review the results and we will get you scheduled for an ICD      General Cardiac Recommendations:  Continue to eat a heart healthy, low salt diet.  Continue to get 20-30 minutes of aerobic activity, 4-5 days per week.  Examples of aerobic activity include walking, running, swimming, cycling, etc.  Continue to observe good oral hygiene, with regular dental visits.      SBE prophylaxis:   Yes____  No__X__    If YES is checked, follow the recommendations outlined below:  Take antibiotic(s) prior to recommended dental procedures and procedures on the respiratory tract or with infected skin, muscle or bones. SBE prophylaxis is not needed for routine GI and  procedures (ie. Colonoscopy or vaginal delivery)  Observe good oral hygiene daily, as advised by your dentist. Get regular professional dental care.  Keep cuts clean.  Infections should be treated promptly.  Symptoms of Infective Endocarditis could include: fever lasting more than 4-5 days or a recurrent fever that initially resolves but returns within 1-2 days)      Exercise restrictions:   Yes__X__  No____         If yes, list restrictions:  Must be allowed to rest if fatigued or SOB      Work restrictions:  Yes____  No_X___         If yes, list restrictions:    FASTING CHOLESTEROL was checked in the last 5 years YES_X__  NO___ (2021)  If no, please follow up with your primary care physician. You should have a cholesterol screening every 5 years.      Follow-up: Follow up with Dr. George and Dr. Torres after ICD placement.     If you have questions or  concerns please contact us at:    Analisa Cerna, MPH, RN, BSN   Berkley Pyle (Scheduling)  Nurse Care Coordinator     Clinic   Adult Congenital and CV Genetics   Adult Congenital and CV Genetic  [facility]  (P) [phone]     (P) [phone]  [email or web address]  (F) [phone]        For after hours urgent needs, call [phone] and ask to speak to the Adult Congenital Physician on call.  Mention Job Code 0401.    For emergencies call 911.    [facility]   Clinics and Surgery Center  Mail Code [id number]  [address] [place] [address]

## 2022-11-25 NOTE — NURSING NOTE
Cardiac Testing: Patient given instructions regarding cardiac MRI and CPX. Discussed purpose, preparation, procedure and when to expect results reported back to the patient. Patient demonstrated understanding of this information and agreed to call with further questions or concerns.  Based on results Dr. George to schedule an ICD placement.     Return Appointment: Patient given instructions regarding scheduling next clinic visit. Patient demonstrated understanding of this information and agreed to call with further questions or concerns.    Patient stated he understood all health information given and agreed to call with further questions or concerns.  Analisa Cerna RN on 11/25/2022 at 12:36 PM

## 2022-11-25 NOTE — NURSING NOTE
Chief Complaint   Patient presents with     Follow Up     42 year old male with history of D-TGA s/p atrial switch (modified Henrik 4/23/81), a flutter s/p DCCV, chest pain, and shortness of breath presenting for follow up       Vitals were taken, medications reconciled, and EKG was performed.    Tiburcio Zimmerman EMT  10:46 AM

## 2022-11-25 NOTE — LETTER
2022      RE: Cricket Chen  [address]  [place] [address]       Dear Colleague,    Thank you for the opportunity to participate in the care of your patient, Cricket Chen, at the [facility] [place] at [facility]. Please see a copy of my visit note below.        [facility] ELECTROPHYSIOLOGY CLINIC VISIT    Assessment/Recommendations   Assessment/Plan:    Mr. Chen is a 42 year old male who has a past medical history significant for dTGA s/p modified Shoemaker operation and stitch closure of small VSD , AFL s/p DCCV and s/p ablation 2000, liver and kidney transplant , post transplant lymphoproliferative disorder, papillary thyroid cancer s/p thyroidectomy, prior ETOH abuse (sober since ), anxiety, and depression. He presents today for follow up.     Atrial Flutter:   We discussed in detail with the patient management/treatment options for AFL includin. Stroke Prophylaxis:  CHADSVASC=+HF, +HTN  2, corresponding to a 2.2% annual stroke / systemic emolism event rate. indicating need for long term oral anticoagulation. He also has congenital anatomy which we would recommend anticoagulation. He is appropriately on Eliquis. No bleeding isues.    2. Rate Control: Continue Metoprolol XL 25 mg daily.   3. Rhythm Control: Cardioversion, Antiarrhythmics and/or ablation are options for rhythm control. He has previously been on Sotalol. Not currently on AATs. S/p DCCV 3/7/18  with history of Ablation at OSH in . Organized A.flutter is highly amenable to ablation procedure. An ablation can be considered for any recurrence.   4. Risk Factor Management: maintain tight BP control, and JUAN evaluation.       dTGA systemic EF 27%, NYHA I-II:  1. ACEi/ARB: Continue Lisiopril.  2. BB: Continue Metoprolol XL 25 mg daily.   3. Aldosterone antagonist: not required.  4. SCD prophylaxis: discussed that systemic EF read 27% and is  still low despite medical therapy, and relatively unchanged visually from prior imaging. We discussed an ICD both with patient and Dr Torres who is his primary [facility] physician. We discussed S-ICD versus TV-ICD. An S-ICD has obvious advantages but cannot monitor atrial arrhythmias or pace. AFter further discussion, the patient wants to pursue TV-ICD.   We discussed with the patient the rationale for ICD placement, alternative therapies,  technical aspects of the surgical procedure, risks/benefits of therapy and need for long-term follow-up in the Device Clinic.  An educational handout regarding ICD therapy was reviewed with the patient.  All question/concerns were addressed. After our discussion, the patient verbalized understanding and wishes to proceed with ICD implant. We will pursue ICD implant after performing CMR and CPX We might hold off on ICD if RVEF by CMR comes back higher than what we are seeing on TTE.    5. Fluid status: euvolemic on exam     Follow up after ICD implant.       History of Present Illness/Subjective    Mr. Cricket Chen is a 42 year old male who comes in today for EP follow-up of [facility] dTGA and AFL.    Mr. Chen is a 42 year old male who has a past medical history significant for dTGA s/p modified Shoemaker operation and stitch closure of small VSD 1981, AFL s/p DCCV and s/p ablation 8/2000, liver and kidney transplant 2016, post transplant lymphoproliferative disorder, papillary thyroid cancer s/p thyroidectomy, prior ETOH abuse (sober since 2014), anxiety, and depression. He presents today for follow up.      He presented to [facility] on 3/6/18 with shortness of breath and chest flutterings that started the day prior. He recalled it felt similar to when he was in AFL in the past. In the [facility], he was found to be in AFL. He was subsequently admitted. He was started on Eliquis and arranged for NEELIMA/DCCV. He had successful DCCV 3/7/18 and he was discharged. He states that he had AFL in the past.  He believes he had a DCCV around age 7. He had also been maintained on Sotalol. He also reports a previous ablation at an OSH in 8/2000 (records not available to us at this time). He did well without AFL issues until 3/2018 when he had recurrence. Most recent echo from 3/30/18 showed systemic EF 35-40%, mild TR, and no evidence of baffle leaks/obstructions. A CMRI from OSH from 3/2017 showed no baffle  Obstruction and systemic EF 33%. He saw [facility] EP clinic on 4/13/2018. He was doing well. He had not noted any further arrhythmias since his DCCV. He followed up in EP clinic in 7/2018 and was doing well without recurrent AFL.      EP Visit 3/2019: He presents today for follow up. He presented to [facility] on 1/16/19 reporting some shortness of breath and palpitations. He was in sinus on presentation to ER. He was discharged on a zio patch monitor. Zio patch from 1/17/19-1/31/19 showed SR with rare ectopy and 7 NSVT up to 10 beats. 3 symptom activations showed sinus. An echo from 1/2019 showed moderately depressed Rv function, no obvious baffle obstructions or leaks. He reports feeling well and no recurrent palpitations. He denies any chest pain/pressures, dizziness, lightheadedness, worsening shortness of breath, leg/ankle swelling, PND, orthopnea, palpitations, or syncopal symptoms. Presenting 12 lead ECG shows SB Vent Rate 59 bpm,  ms,  ms, QTc 429 ms. Current cardiac medications include: Toprol XL, Eliquis, Lisinopril, and Norvasc.      EP Visit 3/13/20: He presents today for follow up. He reports feeling well. He denies any chest pain/pressures, dizziness, lightheadedness, worsening shortness of breath, leg/ankle swelling, PND, orthopnea, palpitations, or syncopal symptoms. Recent CMRI/MRA showed systemic EF 37%, normal baffles without obstruction, fatty metaplasia of sub-pulmonic ventricle and narrowing of intra-hepatic IVC which are unchanged. A zio patch from June 2019 showed SR with 7 NSVT episodes.  Presenting 12 lead ECG shows NSR IRBBB Vent Rate 65 bpm,  ms,  ms, QTc 443 ms. Current cardiac medications include: Toprol XL, Eliquis, Lisinopril, and Norvasc.     EP Visit 11/23/21: He presents today for follow up. He reports feeling very well.  He is stable with no new symptoms, NYHA class II.  Echo today was reviewed and shows mod to sev systemic ventricle dysfunction but also considered no change from last echoA zio patch monitor from 8/16/21-8/30/21 showed 11 NSVT up to 10 beats and rare isolated ectopy with adequate HR distribution in sinus. Current cardiac medications include: Toprol XL, Eliquis, Lisinopril, and Norvasc.     He presents today for follow up. He reports feeling mild palpitations, not significant for patient, no presyncope, less than last year. functionally is very good, except if he goes for jog or run that he has to stop, does 30 min walks every other day with no limitations  No bleeding issues with Eliquis.. Echo today shows Moderate to severe right ventricular dilatation with moderate to severely decreased systolic function (no significant change compared to last echocardiogram). Mild tricuspid valve insufficiency. The pulmonary venous baffle is unobstructed; the systemic venous baffle is unobstructed (mean gradient of 3 mmHg). Qualitatively, normal left ventricular systolic function. Mild pulmonary valve insufficiency. Presenting 12 lead ECG shows NSR with PVC Vent Rate 70 bpm,  ms,  ms, QTc 450 ms. Current cardiac medications include: Toprol XL, Eliquis, Lisinopril.        Cardiographics (Personally Reviewed) :   TTE 11/25/22:  Patient after atrial switch operation for complete transposition of the great arteries. Technically difficult study due to poor acoustic windows. Moderate to severe right ventricular dilatation with moderate to severely decreased systolic function (no significant change compared to last echocardiogram). Mild tricuspid valve insufficiency.  The pulmonary venous baffle is unobstructed; the systemic venous baffle is unobstructed (mean gradient of 3 mmHg). Qualitatively, normal left ventricular systolic function. Mild pulmonary valve insufficiency    TTE 11/23/2021:  Patient after atrial switch operation for complete transposition of the great arteries. Technically difficult study due to poor acoustic windows. Moderate right ventricular dilatation and hypertrophy with moderate to severely decreased systolic function (no significant change compared to last echocardiogram). Mild tricuspid valve insufficiency. The pulmonary venous baffle is unobstructed; the systmic venous baffle was not well seen.Qualitatively, normal left ventricular systolic function. Mild pulmonary valve insufficiency.    3/30/18 ECHOCARDIOGRAM:   Patient after atrial switch operation for complete transposition of the great  arteries. Technically difficult study due to poor acoustic windows. No baffle  obstruction or leaks seen. There is moderate right ventricular enlargement.  Single plane right ventricular EF 35-40 %. Normal left ventricular systolic  function. No outflow obstruction. Mild tricuspid regurgitation.     3/2017 CMRI (OSH REPORT):  1. Transposition of the great arteries with native atrioventricular concordance and ventricular arterial discordance with the aorta anterior to the pulmonary artery (D-transposition of the great arteries).  2. Status post interatrial baffle with the superior vena cava and inferior vena cava baffled to the left (subpulmonic) ventricle without obstruction.  There is no evidence of a baffle leak.    3. Systemic right ventricle:  a. The right ventricle is hypertrophied.  b. Decreased systolic function with an ejection fraction of 33%.  c. Severe dilation of the right ventricle with an end-diastolic volume of 208 mL/m  (previously 188 mL/m ) and end-systolic volume of 140 mL/m  (previous 123 mL/m ).  4. The right ventricle connects with the anterior  aorta.  The coronary artery origins are usual for transposition.  Left-sided aortic arch with measurements above.  5. The pulmonary venous baffle to the right ventricle is widely patent.  6. The subpulmonary left ventricle has normal systolic function with decreased myocardial mass.  The left ventricle connects to the pulmonary artery, which is posterior to the aorta.  7. No significant change from previous cardiac MRI of 9/4/2012 except an increased size in the indexed right ventricular diastolic and systolic Volumes.    3/4/2020 CMRIMRA:  CONCLUSIONS: d-TGA with atrial baffle repair.  The systemic ventricle is moderately enlarged with severely reduced function, EF 27%. The subpulmonic ventricle is small in size with normal function, EF 57%. Normal baffles without any obstruction, thrombus or leakage. There is mild-moderate regurgitation of the systemic atrioventricular (tricuspid) valve. There is no residual intracardiac shunting (Qp/Qs 1.0).   The subpulmonic ventricle exhibits extensive fatty metaplasia in the mid and apical segments of unclear significance. Narrowing of intra-hepatic IVC of unclear significance.  Compared to the prior examination dated 03/09/2017 the systemic ventricle's function appears similar visually. The fatty metaplasia of sub-pulmonic ventricle and narrowing of intra-hepatic IVC  is unchanged from prior examinations.     1/2019 ECHO:  ------CONCLUSIONS------  Patient after atrial switch operation for complete transposition of the great arteries. Technically difficult study due to poor acoustic windows. There is moderate right ventricular enlargement. The right ventricular function is qualitatively moderately depressed. Qualitivley the right ventricle function is unchanged form the 3/30/2018 echo. Mild (1+) tricuspid valve insufficiency.No obvious baffle obstruction or leaks seen. Qualitatively normal left ventricular systolic function. Mild (1+) pulmonary valve insufficiency.Limited  visualization of the LPA suggests narrowing. RPA not seen.       Physical Examination   There were no vitals taken for this visit.  Wt Readings from Last 3 Encounters:   10/03/22 87.2 kg (192 lb 3.2 oz)   03/16/22 92.8 kg (204 lb 9.6 oz)   11/23/21 94.3 kg (208 lb)     General Appearance:   Alert, well-appearing and in no acute distress.   HEENT: Atraumatic, normocephalic. PERRL.  MMM.   Chest/Lungs:   Respirations unlabored.  Lungs are clear to auscultation.   Cardiovascular:   Regular rate and rhythm.    Abdomen:  Soft, nontender, nondistended.   Extremities: No cyanosis or clubbing. No edema.    Musculoskeletal: Moves all extremities.  Gait normal.   Skin: Warm, dry, intact.    Neurologic: Mood and affect are appropriate.  Alert and oriented to person, place, time, and situation.          Medications  Allergies   Current Outpatient Medications   Medication Sig Dispense Refill     ELIQUIS ANTICOAGULANT 5 MG tablet TAKE ONE TABLET BY MOUTH TWICE A  tablet 2     levothyroxine (SYNTHROID/LEVOTHROID) 137 MCG tablet Take 1 tablet (137 mcg) by mouth daily 90 tablet 3     lisinopril (ZESTRIL) 20 MG tablet Take 1 tablet (20 mg) by mouth daily 90 tablet 3     LORazepam (ATIVAN) 1 MG tablet Take 1 tablet (1 mg) by mouth daily as needed (severe anxiety/panic/sleep) 15 tablet 0     magnesium oxide (MAG-OX) 400 MG tablet Take 1 tablet (400 mg) by mouth 2 times daily 120 tablet 11     metoprolol succinate ER (TOPROL-XL) 25 MG 24 hr tablet Take 1 tablet (25 mg) by mouth 2 times daily 180 tablet 3     mycophenolate (GENERIC EQUIVALENT) 250 MG capsule Take 2 capsules (500 mg) by mouth 2 times daily 120 capsule 11     sulfamethoxazole-trimethoprim (BACTRIM) 400-80 MG tablet Take 1 tablet by mouth daily 30 tablet 11     tacrolimus (GENERIC EQUIVALENT) 0.5 MG capsule TAKE ONE CAPSULE BY MOUTH EVERY MORNING (TOTAL DOSE IS 1.5MG IN  THE MORNING AND 1MG IN THE EVENING) 90 capsule 3     tacrolimus (GENERIC EQUIVALENT) 1 MG capsule  TAKE ONE CAPSULE BY MOUTH TWICE A DAY (TOTAL DOSE IS 1.5MG IN THE MORNING AND 1MG IN THE EVENING) 180 capsule 3     traZODone (DESYREL) 50 MG tablet Take 1 tablet (50 mg) by mouth at bedtime as needed, may repeat once for sleep 180 tablet 0    Allergies   Allergen Reactions     Hydromorphone Itching         Lab Results (Personally Reviewed)    Chemistry/lipid CBC Cardiac Enzymes/BNP/TSH/INR   Lab Results   Component Value Date    BUN 17 09/06/2022     09/06/2022    CO2 25 09/06/2022     Creatinine   Date Value Ref Range Status   09/06/2022 1.43 (H) 0.66 - 1.25 mg/dL Final   07/06/2021 1.27 (H) 0.66 - 1.25 mg/dL Final       Lab Results   Component Value Date    CHOL 172 11/01/2022    HDL 39 (L) 11/01/2022     (H) 11/01/2022      Lab Results   Component Value Date    WBC 4.3 09/06/2022    HGB 15.4 09/06/2022    HCT 45.7 09/06/2022    MCV 92 09/06/2022     09/06/2022    Lab Results   Component Value Date    TSH 0.33 (L) 11/01/2022    INR 1.13 10/30/2019        The patient states understanding and is agreeable with the plan.   Jaylen George MD [facility]  Cardiology - Electrophysiology    Total time spent on patient visit, reviewing notes, imaging, labs, orders, and completing necessary documentation: 45 minutes.  >50% of visit spent on counseling patient and/or coordination of care.

## 2022-12-19 DIAGNOSIS — I48.92 ATRIAL FLUTTER, UNSPECIFIED TYPE (H): ICD-10-CM

## 2022-12-22 RX ORDER — METOPROLOL SUCCINATE 25 MG/1
TABLET, EXTENDED RELEASE ORAL
Qty: 180 TABLET | Refills: 2 | Status: SHIPPED | OUTPATIENT
Start: 2022-12-22 | End: 2023-06-23

## 2023-01-02 DIAGNOSIS — Q20.3 COMPLETE TRANSPOSITION OF GREAT VESSELS: ICD-10-CM

## 2023-01-02 DIAGNOSIS — Z94.0 KIDNEY TRANSPLANTED: ICD-10-CM

## 2023-01-02 DIAGNOSIS — I48.92 ATRIAL FLUTTER, UNSPECIFIED TYPE (H): ICD-10-CM

## 2023-01-04 RX ORDER — LISINOPRIL 20 MG/1
TABLET ORAL
Qty: 90 TABLET | Refills: 0 | Status: SHIPPED | OUTPATIENT
Start: 2023-01-04 | End: 2023-04-07

## 2023-01-17 ENCOUNTER — LAB (OUTPATIENT)
Dept: LAB | Facility: CLINIC | Age: 43
End: 2023-01-17
Payer: COMMERCIAL

## 2023-01-17 DIAGNOSIS — Z94.4 LIVER REPLACED BY TRANSPLANT (H): ICD-10-CM

## 2023-01-17 LAB
ERYTHROCYTE [DISTWIDTH] IN BLOOD BY AUTOMATED COUNT: 13.4 % (ref 10–15)
HCT VFR BLD AUTO: 49.5 % (ref 40–53)
HGB BLD-MCNC: 16.5 G/DL (ref 13.3–17.7)
MCH RBC QN AUTO: 30.7 PG (ref 26.5–33)
MCHC RBC AUTO-ENTMCNC: 33.3 G/DL (ref 31.5–36.5)
MCV RBC AUTO: 92 FL (ref 78–100)
PLATELET # BLD AUTO: 145 10E3/UL (ref 150–450)
RBC # BLD AUTO: 5.38 10E6/UL (ref 4.4–5.9)
TACROLIMUS BLD-MCNC: 4.8 UG/L (ref 5–15)
TME LAST DOSE: ABNORMAL H
TME LAST DOSE: ABNORMAL H
WBC # BLD AUTO: 4.8 10E3/UL (ref 4–11)

## 2023-01-17 PROCEDURE — 85027 COMPLETE CBC AUTOMATED: CPT

## 2023-01-17 PROCEDURE — 36415 COLL VENOUS BLD VENIPUNCTURE: CPT

## 2023-01-17 PROCEDURE — 83735 ASSAY OF MAGNESIUM: CPT

## 2023-01-17 PROCEDURE — 80197 ASSAY OF TACROLIMUS: CPT

## 2023-01-17 PROCEDURE — 82248 BILIRUBIN DIRECT: CPT

## 2023-01-17 PROCEDURE — 80053 COMPREHEN METABOLIC PANEL: CPT

## 2023-01-18 LAB
ALBUMIN SERPL-MCNC: 4.3 G/DL (ref 3.4–5)
ALP SERPL-CCNC: 68 U/L (ref 40–150)
ALT SERPL W P-5'-P-CCNC: 52 U/L (ref 0–70)
ANION GAP SERPL CALCULATED.3IONS-SCNC: 11 MMOL/L (ref 3–14)
AST SERPL W P-5'-P-CCNC: 25 U/L (ref 0–45)
BILIRUB DIRECT SERPL-MCNC: 0.1 MG/DL (ref 0–0.2)
BILIRUB SERPL-MCNC: 0.6 MG/DL (ref 0.2–1.3)
BUN SERPL-MCNC: 16 MG/DL (ref 7–30)
CALCIUM SERPL-MCNC: 9.2 MG/DL (ref 8.5–10.1)
CHLORIDE BLD-SCNC: 100 MMOL/L (ref 94–109)
CO2 SERPL-SCNC: 26 MMOL/L (ref 20–32)
CREAT SERPL-MCNC: 1.26 MG/DL (ref 0.66–1.25)
GFR SERPL CREATININE-BSD FRML MDRD: 73 ML/MIN/1.73M2
GLUCOSE BLD-MCNC: 127 MG/DL (ref 70–99)
MAGNESIUM SERPL-MCNC: 1.9 MG/DL (ref 1.6–2.3)
POTASSIUM BLD-SCNC: 4.6 MMOL/L (ref 3.4–5.3)
PROT SERPL-MCNC: 7.3 G/DL (ref 6.8–8.8)
SODIUM SERPL-SCNC: 137 MMOL/L (ref 133–144)

## 2023-01-19 DIAGNOSIS — Z94.4 LIVER REPLACED BY TRANSPLANT (H): ICD-10-CM

## 2023-01-19 DIAGNOSIS — Z94.0 S/P KIDNEY TRANSPLANT: ICD-10-CM

## 2023-01-19 RX ORDER — SULFAMETHOXAZOLE AND TRIMETHOPRIM 400; 80 MG/1; MG/1
TABLET ORAL
Qty: 30 TABLET | Refills: 11 | Status: SHIPPED | OUTPATIENT
Start: 2023-01-19 | End: 2023-03-20

## 2023-01-20 DIAGNOSIS — I48.92 ATRIAL FLUTTER, UNSPECIFIED TYPE (H): ICD-10-CM

## 2023-01-20 DIAGNOSIS — Q20.3 COMPLETE TRANSPOSITION OF GREAT VESSELS: ICD-10-CM

## 2023-01-20 DIAGNOSIS — Z94.4 LIVER REPLACED BY TRANSPLANT (H): ICD-10-CM

## 2023-01-20 DIAGNOSIS — Z94.0 S/P KIDNEY TRANSPLANT: ICD-10-CM

## 2023-01-20 DIAGNOSIS — Z94.0 KIDNEY TRANSPLANTED: ICD-10-CM

## 2023-01-20 RX ORDER — TACROLIMUS 1 MG/1
CAPSULE ORAL
Qty: 180 CAPSULE | Refills: 3 | Status: SHIPPED | OUTPATIENT
Start: 2023-01-20 | End: 2024-03-18

## 2023-01-25 RX ORDER — LISINOPRIL 20 MG/1
TABLET ORAL
Qty: 90 TABLET | Refills: 0 | OUTPATIENT
Start: 2023-01-25

## 2023-01-26 ENCOUNTER — HOSPITAL ENCOUNTER (OUTPATIENT)
Dept: CARDIOLOGY | Facility: CLINIC | Age: 43
Discharge: HOME | End: 2023-01-26
Payer: COMMERCIAL

## 2023-01-26 ENCOUNTER — HOSPITAL ENCOUNTER (OUTPATIENT)
Dept: MRI IMAGING | Facility: CLINIC | Age: 43
Discharge: HOME | End: 2023-01-26
Payer: COMMERCIAL

## 2023-01-26 DIAGNOSIS — Z94.0 KIDNEY TRANSPLANTED: ICD-10-CM

## 2023-01-26 DIAGNOSIS — Q20.3 COMPLETE TRANSPOSITION OF GREAT VESSELS: ICD-10-CM

## 2023-01-26 DIAGNOSIS — I48.92 ATRIAL FLUTTER, UNSPECIFIED TYPE (H): ICD-10-CM

## 2023-01-26 PROCEDURE — 94621 CARDIOPULM EXERCISE TESTING: CPT

## 2023-01-26 PROCEDURE — 75565 CARD MRI VELOC FLOW MAPPING: CPT

## 2023-01-26 PROCEDURE — 75565 CARD MRI VELOC FLOW MAPPING: CPT | Mod: 26 | Performed by: INTERNAL MEDICINE

## 2023-01-26 PROCEDURE — 94621 CARDIOPULM EXERCISE TESTING: CPT | Mod: 26 | Performed by: INTERNAL MEDICINE

## 2023-01-26 PROCEDURE — 255N000002 HC RX 255 OP 636

## 2023-01-26 PROCEDURE — A9585 GADOBUTROL INJECTION: HCPCS

## 2023-01-26 PROCEDURE — 75561 CARDIAC MRI FOR MORPH W/DYE: CPT | Mod: 26 | Performed by: INTERNAL MEDICINE

## 2023-01-26 RX ORDER — GADOBUTROL 604.72 MG/ML
10 INJECTION INTRAVENOUS ONCE
Status: COMPLETED | OUTPATIENT
Start: 2023-01-26 | End: 2023-01-26

## 2023-01-26 RX ADMIN — GADOBUTROL 10 ML: 604.72 INJECTION INTRAVENOUS at 14:18

## 2023-01-27 DIAGNOSIS — I42.8 NICM (NONISCHEMIC CARDIOMYOPATHY) (H): Primary | ICD-10-CM

## 2023-01-27 LAB
CARDIOPULMONARY ANAEROBIC THRESHOLD PREDICTED PEAK: 55 %
CARDIOPULMONARY ANAEROBIC THRESHOLD VO2: 20.1 ML/KG/MIN
CARDIOPULMONARY BLOOD PRESSURE REST: NORMAL MMHG
CARDIOPULMONARY BREATHING RESERVE REST: 91.5
CARDIOPULMONARY BREATHING RESERVE V02MAX: 52
CARDIOPULMONARY CO2 OUTPUT REST: 354 ML/MIN
CARDIOPULMONARY CO2 OUTPUT VO2MAX: 2679 ML/MIN
CARDIOPULMONARY FEV 1.0 (L) ACTUAL: 4.57
CARDIOPULMONARY FEV 1.0 (L) PRECENT: 110 %
CARDIOPULMONARY FEV 1.0 (L) PREDICTED: 4.16
CARDIOPULMONARY FEV 1.0 FVC (%) ACTUAL: 81.7
CARDIOPULMONARY FEV 1.0 FVC (%) PERCENT: 103 %
CARDIOPULMONARY FEV 1.0 FVC (%) PREDICTED: 79.4
CARDIOPULMONARY FUNCTIONAL CAPACITY MAX ML/KG/MIN: 23.6 ML/KG/MIN
CARDIOPULMONARY FUNCTIONAL CAPACITY PERCENT: 65 %
CARDIOPULMONARY FUNCTIONAL CAPACITY PREDICTED: 36.4 ML/KG/MIN
CARDIOPULMONARY FVC (L) ACTUAL: 5.59
CARDIOPULMONARY FVC (L) PERCENT: 106 %
CARDIOPULMONARY FVC (L) PREDICTED: 5.25
CARDIOPULMONARY HEART RATE REST: 66 BPM
CARDIOPULMONARY MET'S REST: 1.3
CARDIOPULMONARY MINUTE VENTILATION REST: 13.6 L/MIN
CARDIOPULMONARY MINUTE VENTILATION VO2MAX: 77.3 L/MIN
CARDIOPULMONARY MYOCARDIAC O2 DEMAND MAX: NORMAL
CARDIOPULMONARY OXYGEN CONSUMPTION REST: 4.6 ML/KG/MIN
CARDIOPULMONARY OXYGEN CONSUMPTION VO2MAX: 23.6 ML/KG/MIN
CARDIOPULMONARY OXYGEN PULSE REST: 5 ML/BEAT
CARDIOPULMONARY OXYGEN PULSE VO2MAX: 12.4 ML/BEAT
CARDIOPULMONARY OXYGEN SATURATION- OXIMETRY REST: 100 %
CARDIOPULMONARY OXYGEN SATURATION- OXIMETRY VO2MAX: 100 %
CARDIOPULMONARY PET C02 REST: 33
CARDIOPULMONARY PET C02 VO2MAX: 38
CARDIOPULMONARY PET02 REST: 107
CARDIOPULMONARY PET02 V02 MAX: 109
CARDIOPULMONARY RER: 1.12
CARDIOPULMONARY RESPIRALORY EXCHANGE RATIO VO2MAX: 1.12
CARDIOPULMONARY RESPIRALORY EXCHANGE RATIO: 0.86
CARDIOPULMONARY RESPIRATORY RATE REST: 14 BR/MIN
CARDIOPULMONARY RESPIRATORY RATE VO2MAX: 36 BR/MIN
CARDIOPULMONARY STRESS BASE 1 BP MMHG: NORMAL MMHG
CARDIOPULMONARY STRESS BASE 1 BPA: 149 BPM
CARDIOPULMONARY STRESS BASE 1 SPO2: 100 % SPO2
CARDIOPULMONARY STRESS BASE 1 TIME SEC: 0 SEC
CARDIOPULMONARY STRESS BASE 1 TIME: 1 MINS
CARDIOPULMONARY STRESS BASE 2 BP MMHG: NORMAL MMHG
CARDIOPULMONARY STRESS BASE 2 BPA: 106 BPM
CARDIOPULMONARY STRESS BASE 2 SPO2: 100 % SPO2
CARDIOPULMONARY STRESS BASE 2 TIME SEC: 0 SEC
CARDIOPULMONARY STRESS BASE 2 TIME: 3 MINS
CARDIOPULMONARY STRESS BASE 3 BP MMHG: NORMAL MMHG
CARDIOPULMONARY STRESS BASE 3 BPA: 96 BPM
CARDIOPULMONARY STRESS BASE 3 SPO2: 99 % SPO2
CARDIOPULMONARY STRESS BASE 3 TIME SEC: 0 SEC
CARDIOPULMONARY STRESS BASE 3 TIME: 5 MINS
CARDIOPULMONARY STRESS PHASE 1 BP MMHG: NORMAL MMHG
CARDIOPULMONARY STRESS PHASE 1 BPM: 95 BPM
CARDIOPULMONARY STRESS PHASE 1 SPO2: 100 % SPO2
CARDIOPULMONARY STRESS PHASE 1 TIME SEC: 0 SEC
CARDIOPULMONARY STRESS PHASE 1 TIME: 3 MINS
CARDIOPULMONARY STRESS PHASE 2 BP MMHG: NORMAL MMHG
CARDIOPULMONARY STRESS PHASE 2 BPM: 124 BPM
CARDIOPULMONARY STRESS PHASE 2 SPO2: 100 % SPO2
CARDIOPULMONARY STRESS PHASE 2 TIME SEC: 0 SEC
CARDIOPULMONARY STRESS PHASE 2 TIME: 6 MINS
CARDIOPULMONARY STRESS PHASE 3 BP MMHG: NORMAL MMHG
CARDIOPULMONARY STRESS PHASE 3 BPM: 152 BPM
CARDIOPULMONARY STRESS PHASE 3 SPO2: 100 % SPO2
CARDIOPULMONARY STRESS PHASE 3 TIME SEC: 0 SEC
CARDIOPULMONARY STRESS PHASE 3 TIME: 9 MINS
CARDIOPULMONARY STRESS PHASE 4 BP MMHG: NORMAL MMHG
CARDIOPULMONARY STRESS PHASE 4 BPM: 171 BPM
CARDIOPULMONARY STRESS PHASE 4 SPO2: 100 % SPO2
CARDIOPULMONARY STRESS PHASE 4 TIME SEC: 0 SEC
CARDIOPULMONARY STRESS PHASE 4 TIME: 11 MINS
CARDIOPULMONARY SVC (L) ACTUAL: 5.86
CARDIOPULMONARY SVC (L) PERCENT: 112 %
CARDIOPULMONARY SVC (L) PREDICTED: 5.25
CARDIOPULMONARY TIDAL VOLUME REST: 951 ML
CARDIOPULMONARY TIDAL VOLUME VO2MAX: 2145 ML
CARDIOPULMONARY VE/VCO2 SLOPE: 29.74
CARDIOPULMONARY VENTILATORY EQUIVALENT 02 REST: 33
CARDIOPULMONARY VENTILATORY EQUIVALENT 02 V02: 32
CARDIOPULMONARY VENTILATORY EQUIVALENT C02 REST: 38
CARDIOPULMONARY VENTILATORY EQUIVALENT C02 SLOPE VO2MAX: 29.74
CARDIOPULMONARY VENTILATORY EQUIVALENT C02 VO2MAX: 29
CV STRESS MAX HR HE: 171
PREDICTED VO2MAX: 23.6
RATED PERCEIVED EXERTION: 13
STRESS ANGINA INDEX: 0
STRESS ECHO BASELINE BP: NORMAL MMHG
STRESS ECHO BASELINE HR: 60 BPM
STRESS ECHO CALCULATED PERCENT HR: 96 %
STRESS ECHO LAST STRESS BP: NORMAL MMHG
STRESS ECHO POST ESTIMATED WORKLOAD: 6.7 METS
STRESS ECHO POST EXERCISE DUR MIN: 11 MIN
STRESS ECHO POST EXERCISE DUR SEC: 1 SEC
STRESS ECHO TARGET HR: 178

## 2023-01-27 RX ORDER — CEFAZOLIN SODIUM 2 G/50ML
2 SOLUTION INTRAVENOUS
Status: CANCELLED | OUTPATIENT
Start: 2023-01-27

## 2023-01-27 RX ORDER — LIDOCAINE 40 MG/G
CREAM TOPICAL
Status: CANCELLED | OUTPATIENT
Start: 2023-01-27

## 2023-01-27 RX ORDER — SODIUM CHLORIDE 9 MG/ML
INJECTION, SOLUTION INTRAVENOUS CONTINUOUS
Status: CANCELLED | OUTPATIENT
Start: 2023-01-27

## 2023-01-30 ENCOUNTER — TELEPHONE (OUTPATIENT)
Dept: CARDIOLOGY | Facility: CLINIC | Age: 43
End: 2023-01-30
Payer: COMMERCIAL

## 2023-01-30 NOTE — TELEPHONE ENCOUNTER
EP Scheduling called the patient to schedule ICD impant with Dr. George. The number [phone] was left for the patient to return the call and schedule the procedure.    Janina Brush  Periop Electrophysiology   [phone]

## 2023-02-01 DIAGNOSIS — Q20.3 D-TGA (DEXTRO-TRANSPOSITION OF GREAT ARTERIES): ICD-10-CM

## 2023-02-01 DIAGNOSIS — I42.8 NICM (NONISCHEMIC CARDIOMYOPATHY) (H): Primary | ICD-10-CM

## 2023-02-01 DIAGNOSIS — I48.92 ATRIAL FLUTTER, UNSPECIFIED TYPE (H): ICD-10-CM

## 2023-02-28 ENCOUNTER — APPOINTMENT (OUTPATIENT)
Dept: GENERAL RADIOLOGY | Facility: CLINIC | Age: 43
End: 2023-02-28
Payer: COMMERCIAL

## 2023-02-28 ENCOUNTER — APPOINTMENT (OUTPATIENT)
Dept: MEDSURG UNIT | Facility: CLINIC | Age: 43
End: 2023-02-28
Attending: INTERNAL MEDICINE
Payer: COMMERCIAL

## 2023-02-28 ENCOUNTER — ANCILLARY PROCEDURE (OUTPATIENT)
Dept: CARDIOLOGY | Facility: CLINIC | Age: 43
End: 2023-02-28
Payer: COMMERCIAL

## 2023-02-28 ENCOUNTER — HOSPITAL ENCOUNTER (OUTPATIENT)
Facility: CLINIC | Age: 43
Discharge: HOME | End: 2023-02-28
Attending: INTERNAL MEDICINE | Admitting: INTERNAL MEDICINE
Payer: COMMERCIAL

## 2023-02-28 ENCOUNTER — APPOINTMENT (OUTPATIENT)
Dept: LAB | Facility: CLINIC | Age: 43
End: 2023-02-28
Attending: INTERNAL MEDICINE
Payer: COMMERCIAL

## 2023-02-28 VITALS
HEART RATE: 62 BPM | HEIGHT: 70 IN | WEIGHT: 195 LBS | DIASTOLIC BLOOD PRESSURE: 64 MMHG | SYSTOLIC BLOOD PRESSURE: 109 MMHG | RESPIRATION RATE: 16 BRPM | OXYGEN SATURATION: 98 % | BODY MASS INDEX: 27.92 KG/M2 | TEMPERATURE: 98 F

## 2023-02-28 DIAGNOSIS — Z95.810 PRESENCE OF IMPLANTABLE CARDIOVERTER-DEFIBRILLATOR (ICD): Primary | ICD-10-CM

## 2023-02-28 DIAGNOSIS — I42.8 NICM (NONISCHEMIC CARDIOMYOPATHY) (H): ICD-10-CM

## 2023-02-28 LAB
ANION GAP SERPL CALCULATED.3IONS-SCNC: 12 MMOL/L (ref 7–15)
BUN SERPL-MCNC: 16.5 MG/DL (ref 6–20)
CALCIUM SERPL-MCNC: 9.9 MG/DL (ref 8.6–10)
CHLORIDE SERPL-SCNC: 102 MMOL/L (ref 98–107)
CREAT SERPL-MCNC: 1.34 MG/DL (ref 0.67–1.17)
DEPRECATED HCO3 PLAS-SCNC: 24 MMOL/L (ref 22–29)
ERYTHROCYTE [DISTWIDTH] IN BLOOD BY AUTOMATED COUNT: 12.8 % (ref 10–15)
GFR SERPL CREATININE-BSD FRML MDRD: 68 ML/MIN/1.73M2
GLUCOSE SERPL-MCNC: 117 MG/DL (ref 70–99)
HCT VFR BLD AUTO: 50.5 % (ref 40–53)
HGB BLD-MCNC: 16.8 G/DL (ref 13.3–17.7)
HOLD SPECIMEN: NORMAL
MCH RBC QN AUTO: 30.4 PG (ref 26.5–33)
MCHC RBC AUTO-ENTMCNC: 33.3 G/DL (ref 31.5–36.5)
MCV RBC AUTO: 92 FL (ref 78–100)
PLATELET # BLD AUTO: 155 10E3/UL (ref 150–450)
POTASSIUM SERPL-SCNC: 4.3 MMOL/L (ref 3.4–5.3)
RBC # BLD AUTO: 5.52 10E6/UL (ref 4.4–5.9)
SODIUM SERPL-SCNC: 138 MMOL/L (ref 136–145)
WBC # BLD AUTO: 5.3 10E3/UL (ref 4–11)

## 2023-02-28 PROCEDURE — 272N000001 HC OR GENERAL SUPPLY STERILE: Performed by: INTERNAL MEDICINE

## 2023-02-28 PROCEDURE — 99152 MOD SED SAME PHYS/QHP 5/>YRS: CPT | Performed by: INTERNAL MEDICINE

## 2023-02-28 PROCEDURE — 36415 COLL VENOUS BLD VENIPUNCTURE: CPT | Performed by: NURSE PRACTITIONER

## 2023-02-28 PROCEDURE — 999N000142 HC STATISTIC PROCEDURE PREP ONLY

## 2023-02-28 PROCEDURE — 82374 ASSAY BLOOD CARBON DIOXIDE: CPT | Performed by: NURSE PRACTITIONER

## 2023-02-28 PROCEDURE — C1898 LEAD, PMKR, OTHER THAN TRANS: HCPCS | Performed by: INTERNAL MEDICINE

## 2023-02-28 PROCEDURE — 272N000002 HC OR SUPPLY OTHER OPNP: Performed by: INTERNAL MEDICINE

## 2023-02-28 PROCEDURE — 999N000065 XR CHEST PORT 1 VIEW

## 2023-02-28 PROCEDURE — 250N000011 HC RX IP 250 OP 636: Performed by: INTERNAL MEDICINE

## 2023-02-28 PROCEDURE — 82435 ASSAY OF BLOOD CHLORIDE: CPT | Performed by: NURSE PRACTITIONER

## 2023-02-28 PROCEDURE — 999N000134 HC STATISTIC PP CARE STAGE 3

## 2023-02-28 PROCEDURE — 85027 COMPLETE CBC AUTOMATED: CPT | Performed by: NURSE PRACTITIONER

## 2023-02-28 PROCEDURE — 93010 ELECTROCARDIOGRAM REPORT: CPT | Mod: 59 | Performed by: INTERNAL MEDICINE

## 2023-02-28 PROCEDURE — 99153 MOD SED SAME PHYS/QHP EA: CPT | Performed by: INTERNAL MEDICINE

## 2023-02-28 PROCEDURE — C1894 INTRO/SHEATH, NON-LASER: HCPCS | Performed by: INTERNAL MEDICINE

## 2023-02-28 PROCEDURE — 33249 INSJ/RPLCMT DEFIB W/LEAD(S): CPT | Performed by: INTERNAL MEDICINE

## 2023-02-28 PROCEDURE — 82947 ASSAY GLUCOSE BLOOD QUANT: CPT | Performed by: NURSE PRACTITIONER

## 2023-02-28 PROCEDURE — 71045 X-RAY EXAM CHEST 1 VIEW: CPT | Mod: 26 | Performed by: RADIOLOGY

## 2023-02-28 PROCEDURE — C1779 LEAD, PMKR, TRANSVENOUS VDD: HCPCS | Performed by: INTERNAL MEDICINE

## 2023-02-28 PROCEDURE — 999N000054 HC STATISTIC EKG NON-CHARGEABLE

## 2023-02-28 PROCEDURE — 258N000003 HC RX IP 258 OP 636: Performed by: NURSE PRACTITIONER

## 2023-02-28 PROCEDURE — 93283 PRGRMG EVAL IMPLANTABLE DFB: CPT

## 2023-02-28 PROCEDURE — 250N000011 HC RX IP 250 OP 636: Performed by: NURSE PRACTITIONER

## 2023-02-28 PROCEDURE — C1721 AICD, DUAL CHAMBER: HCPCS | Performed by: INTERNAL MEDICINE

## 2023-02-28 PROCEDURE — 93005 ELECTROCARDIOGRAM TRACING: CPT

## 2023-02-28 PROCEDURE — 999N000064 CARDIAC DEVICE CHECK - INPATIENT

## 2023-02-28 PROCEDURE — 999N000128 HC STATISTIC PERIPHERAL IV START W/O US GUIDANCE

## 2023-02-28 PROCEDURE — 93283 PRGRMG EVAL IMPLANTABLE DFB: CPT | Mod: 26 | Performed by: INTERNAL MEDICINE

## 2023-02-28 DEVICE — LEAD SPRINT QUATTRO SECURE S 55 6935M55: Type: IMPLANTABLE DEVICE | Status: FUNCTIONAL

## 2023-02-28 DEVICE — IMPLANTABLE DEVICE: Type: IMPLANTABLE DEVICE | Status: FUNCTIONAL

## 2023-02-28 DEVICE — DEFIB ICD COBALT XT IMPLANTABLE DDPA2D4: Type: IMPLANTABLE DEVICE | Status: FUNCTIONAL

## 2023-02-28 RX ORDER — FENTANYL CITRATE 50 UG/ML
INJECTION, SOLUTION INTRAMUSCULAR; INTRAVENOUS
Status: DISCONTINUED | OUTPATIENT
Start: 2023-02-28 | End: 2023-02-28 | Stop reason: HOSPADM

## 2023-02-28 RX ORDER — NALOXONE HYDROCHLORIDE 0.4 MG/ML
0.4 INJECTION, SOLUTION INTRAMUSCULAR; INTRAVENOUS; SUBCUTANEOUS
Status: DISCONTINUED | OUTPATIENT
Start: 2023-02-28 | End: 2023-02-28 | Stop reason: HOSPADM

## 2023-02-28 RX ORDER — SODIUM CHLORIDE 9 MG/ML
INJECTION, SOLUTION INTRAVENOUS CONTINUOUS
Status: DISCONTINUED | OUTPATIENT
Start: 2023-02-28 | End: 2023-02-28 | Stop reason: HOSPADM

## 2023-02-28 RX ORDER — NALOXONE HYDROCHLORIDE 0.4 MG/ML
0.2 INJECTION, SOLUTION INTRAMUSCULAR; INTRAVENOUS; SUBCUTANEOUS
Status: DISCONTINUED | OUTPATIENT
Start: 2023-02-28 | End: 2023-02-28 | Stop reason: HOSPADM

## 2023-02-28 RX ORDER — LIDOCAINE 40 MG/G
CREAM TOPICAL
Status: DISCONTINUED | OUTPATIENT
Start: 2023-02-28 | End: 2023-02-28 | Stop reason: HOSPADM

## 2023-02-28 RX ORDER — ONDANSETRON 2 MG/ML
INJECTION INTRAMUSCULAR; INTRAVENOUS
Status: DISCONTINUED | OUTPATIENT
Start: 2023-02-28 | End: 2023-02-28 | Stop reason: HOSPADM

## 2023-02-28 RX ORDER — CEFAZOLIN SODIUM 2 G/100ML
INJECTION, SOLUTION INTRAVENOUS CONTINUOUS PRN
Status: COMPLETED | OUTPATIENT
Start: 2023-02-28 | End: 2023-02-28

## 2023-02-28 RX ORDER — DIPHENHYDRAMINE HYDROCHLORIDE 50 MG/ML
INJECTION INTRAMUSCULAR; INTRAVENOUS
Status: DISCONTINUED | OUTPATIENT
Start: 2023-02-28 | End: 2023-02-28 | Stop reason: HOSPADM

## 2023-02-28 RX ORDER — CEPHALEXIN 500 MG/1
500 TABLET ORAL 3 TIMES DAILY
Qty: 15 TABLET | Refills: 0 | Status: SHIPPED | OUTPATIENT
Start: 2023-02-28 | End: 2023-03-05

## 2023-02-28 RX ORDER — IOPAMIDOL 755 MG/ML
INJECTION, SOLUTION INTRAVASCULAR
Status: DISCONTINUED | OUTPATIENT
Start: 2023-02-28 | End: 2023-02-28 | Stop reason: HOSPADM

## 2023-02-28 RX ORDER — CEFAZOLIN SODIUM 2 G/100ML
2 INJECTION, SOLUTION INTRAVENOUS
Status: COMPLETED | OUTPATIENT
Start: 2023-02-28 | End: 2023-02-28

## 2023-02-28 RX ADMIN — CEFAZOLIN SODIUM 2 G: 2 INJECTION, SOLUTION INTRAVENOUS at 10:42

## 2023-02-28 RX ADMIN — SODIUM CHLORIDE: 9 INJECTION, SOLUTION INTRAVENOUS at 10:42

## 2023-02-28 ASSESSMENT — ACTIVITIES OF DAILY LIVING (ADL)
ADLS_ACUITY_SCORE: 37

## 2023-02-28 NOTE — H&P
Electrophysiology Pre-Procedure History and Physical    Cricket Chen MRN# [id number]   Age: 42 year old YOB: 1980      Date of Procedure: 2/28/2023 Location [facility]      Date of Exam 2/28/2023 Facility Same day       [facility]   Primary care provider: Seble Reilly    HPI:   Cricket Chen is a 42 year old male with past medical history significant for dTGA s/p modified Shoemaker operation and stitch closure of small VSD 1981, AFL s/p DCCV and s/p ablation 8/2000, liver and kidney transplant 2016, post transplant lymphoproliferative disorder, papillary thyroid cancer s/p thyroidectomy, prior ETOH abuse (sober since 2014), anxiety, and depression. Pt previously presented to [facility] on 3/6/18 with shortness of breath and palpitations, similar to his AFL in the past. He underwent NEELIMA/DCCV on 3/7 and was discharged. He reports previous ablation at OSH in 8/2000 (records not available for review). He did well without AFL issues until 3/2018 when he had recurrence. Echo from 3/30/18 showed systemic EF 35-40%, mild TR, and no evidence of baffle leaks/obstructions. A CMRI from OSH from 3/2017 showed no baffle obstruction and systemic EF 33%. Since this time, pt's EF has remained decreased. CMR from 2020 with systemic EF of 27%. TTE completed in 2022 with severely decreased systolic systemic function, similar function as TTE completed in 2021. At office visit with Dr George on 11/25/22, ICD implant was discussed with the patient. A transvenous ICD was decided upon for advantage of being able to monitor his atrial arrhythmias and for pacing if needed. The surgical procedure, risks/benefits were discussed and he wished to proceed with implant for which he presents today.            Active problem list:     Patient Active Problem List    Diagnosis Date Noted    Post-transplant lymphoproliferative disorder (H) 02/01/2017      Priority: High    EBV (Abelino-Barr virus) viremia 06/09/2021     Priority: Medium    CKD (chronic kidney disease) stage 2, GFR 60-89 ml/min 05/11/2021     Priority: Medium    Dermatitis, seborrheic 08/13/2018     Priority: Medium    Postoperative hypothyroidism 04/04/2018     Priority: Medium    Paroxysmal atrial fibrillation (H) 03/07/2018     Priority: Medium    HTN, kidney transplant related 01/16/2018     Priority: Medium    Papillary thyroid carcinoma (H) 03/16/2017     Priority: Medium    Aftercare following organ transplant 01/03/2017     Priority: Medium    Hypomagnesemia 07/09/2016     Priority: Medium    Secondary renal hyperparathyroidism (H) 07/09/2016     Priority: Medium    Immunosuppression (H) 05/19/2016     Priority: Medium    Liver replaced by transplant (H) 05/11/2016     Priority: Medium    Kidney replaced by transplant 05/11/2016     Priority: Medium    [facility] 03/06/2015     Priority: Medium     For urgent after hours needs, please call [phone] and ask to speak with the Adult [facility] Physician on call - mention job code [phone].        Thrombocytopenia (H) 10/20/2014     Priority: Medium    History of transposition of great vessels      Priority: Medium     8 months old  Overview:   S/p atrial baffle repair in 1981  Followed at [facility]: [phone]      Depression      Priority: Medium    History of alcohol abuse 08/05/2014     Priority: Medium     Sober since summer 2014 per patient.      Alcoholic hepatitis 07/04/2014     Priority: Medium    Insomnia 07/02/2014     Priority: Medium    Esophageal varices (H) 05/30/2014     Priority: Medium    Atrial flutter (H) 05/29/2014     Priority: Medium    Advance directive discussed with patient 06/03/2013     Priority: Medium     Overview:   Patient has identified Health Care Agent(s): Gricel (mother) [phone]    Patient has  Advance Care Plan Documents (Health Care Directive, POLST): No, Health Care Packet given to patient.    Patient has identified Specific Treatment Preferences: No   Specific limits to treatment preferences NOT identified: ASSUME FULL TREATMENT.              Medications (include herbals and vitamins):      No current facility-administered medications for this encounter.     Current Outpatient Medications   Medication Sig    ELIQUIS ANTICOAGULANT 5 MG tablet TAKE ONE TABLET BY MOUTH TWICE A DAY    levothyroxine (SYNTHROID/LEVOTHROID) 137 MCG tablet Take 1 tablet (137 mcg) by mouth daily    lisinopril (ZESTRIL) 20 MG tablet TAKE ONE TABLET BY MOUTH ONCE DAILY    LORazepam (ATIVAN) 1 MG tablet Take 1 tablet (1 mg) by mouth daily as needed (severe anxiety/panic/sleep)    magnesium oxide (MAG-OX) 400 MG tablet Take 1 tablet (400 mg) by mouth 2 times daily    metoprolol succinate ER (TOPROL XL) 25 MG 24 hr tablet TAKE ONE TABLET BY MOUTH TWICE A DAY    mycophenolate (GENERIC EQUIVALENT) 250 MG capsule Take 2 capsules (500 mg) by mouth 2 times daily    sulfamethoxazole-trimethoprim (BACTRIM) 400-80 MG tablet TAKE ONE TABLET BY MOUTH ONCE DAILY    tacrolimus (GENERIC EQUIVALENT) 0.5 MG capsule TAKE ONE CAPSULE BY MOUTH EVERY MORNING (TOTAL DOSE IS 1.5MG IN  THE MORNING AND 1MG IN THE EVENING)    tacrolimus (GENERIC EQUIVALENT) 1 MG capsule TAKE ONE CAPSULE BY MOUTH TWICE A DAY (TOTAL DOSE IS 1.5MG IN THE MORNING AND 1MG IN THE EVENING)    traZODone (DESYREL) 50 MG tablet Take 1 tablet (50 mg) by mouth at bedtime as needed, may repeat once for sleep           Medication List         Notice    Cannot display discharge medications because the patient has not yet been admitted.              Allergies:      Allergies   Allergen Reactions    Hydromorphone Itching             Social History:     Social History     Tobacco Use    Smoking status: Former     Packs/day: 1.00     Years: 3.00     Pack years: 3.00     Types: Cigarettes      Start date: 1997     Quit date: 2000     Years since quittin.4    Smokeless tobacco: Former     Quit date: 9/10/2001    Tobacco comments:     Quit chewing in early    Substance Use Topics    Alcohol use: No     Alcohol/week: 0.0 standard drinks     Comment: ~10 drinks per day for ten years, quit in 2014            Physical Exam:   All vitals have been reviewed  No data found.  No intake/output data recorded.  Airway assessment:   Patient is able to open mouth wide  Patient is able to stick out tongue}      ENT:   normocepalic, without obvious abnormality     Neck:   supple, symmetrical, trachea midline     Lungs:   No increased work of breathing, good air exchange, clear to auscultation bilaterally, no crackles or wheezing     Cardiovascular:   normal S1 and S2 and no edema             Lab / Radiology Results:     Lab Results   Component Value Date    WBC 4.8 2023    WBC 4.9 2021    RBC 5.38 2023    RBC 5.23 2021    HGB 16.5 2023    HGB 16.1 2021    HCT 49.5 2023    HCT 45.9 2021    MCV 92 2023    MCV 88 2021    RDW 13.4 2023    RDW 12.9 2021     2023     2021      Lab Results   Component Value Date    WBC 4.8 2023    WBC 4.9 2021     Lab Results   Component Value Date     2023     2021     Lab Results   Component Value Date    HGB 16.5 2023    HGB 16.1 2021    HCT 49.5 2023    HCT 45.9 2021     Lab Results   Component Value Date     2023     2021    CO2 26 2023    CO2 27 2021    BUN 16 2023    BUN 16 2021    CREAT 1.5 2019     Lab Results   Component Value Date     2023     2021    CO2 26 2023    CO2 27 2021    BUN 16 2023    BUN 16 2021    CREAT 1.5 2019     Lab Results   Component Value Date     2023      07/06/2021     No components found for: K  Lab Results   Component Value Date    BUN 16 01/17/2023    BUN 16 07/06/2021    CREAT 1.5 07/08/2019     Lab Results   Component Value Date    TSH 0.33 11/01/2022    TSH 0.16 05/14/2021             Plan:   Patient's active problems diagnostically and therapeutically optimized for the planned procedure. Patient here for transvenous implantable cardio-defbrillator (ICD) insertion. Procedure explained in detail to patient including indications, risks, and benefits. He states understanding and wishes to procced.       Janelle Dillard PA-C  [facility]  Electrophysiology Consult Service  Pager: [phone]

## 2023-02-28 NOTE — PROGRESS NOTES
D/I/A:  Patient is tolerating liquids and foods, ambulating, urinating, puncture sites are stable (no bleeding and no hematoma) and patient has a .  A&Ox4 and making needs known.  CCL access site: Left chest site post ICD placement with primapore C/D/I; no bleeding or hematoma; CMS intact.  VSSA. NSR on monitor. IV access removed.  Education completed and outlined in AVS or handout: educated patient on activity restrictions, when to seek medical attention, follow-up appointments and antibiotic, medications reviewed with patient. Questions answered prior to discharge. Belongings returned to patient at discharge. Patient ambulated to front door with RNMelany received from pharmacy.  P: Discharged to self care.  Patient to follow up with appts as per discharge instruction. Patient's father Bill to transport patient home.

## 2023-02-28 NOTE — Clinical Note
dry, intact, no bleeding and no hematoma. Incision closed with sutures by MD. Steristrips applied. Primapore dressing applied.

## 2023-02-28 NOTE — Clinical Note
Potential access sites were evaluated for patency using ultrasound.   The left subclavian vein was selected for access two times. Access was obtained under with Fluoroscopic guidance using a micropuncture 21 gauge needle with direct visualization of needle entry.

## 2023-02-28 NOTE — PROGRESS NOTES
Patient prepped for new ICP insertion. Left AC IV placed by vascular, IV antibiotic infusing.  VSS. NPO since 2/27.  Chest clipped, patient did CHG wipes at home per instructions. Patient's Dad Bill [phone] will pick patient up.  Labs WNL except creatine 1.34 which seems to be patient's baseline. Waiting for consent to be signed.

## 2023-02-28 NOTE — DISCHARGE INSTRUCTIONS
Home Care after an ICD implant    Wound care:  Keep your incision (surgery wound) dry for 3 days.  After 3 days, you may remove the outer bandage.  Keep the strips of tape on.  They will be removed at your clinic visit.  Check for signs of infection each day.  These include increased redness, swelling, drainage or a fever over 101 F (38.3 C).  Call us immediately if you see any of these signs.  If there are no signs of infection, you may shower in 3 days.  Do not submerge the incision (in a bath tub, hot tub, or swimming pool) until fully healed.  Pain:   You may have mild to moderate pain for 3 to 5 days.  Take acetaminophen (Tylenol) or ibuprofen (Advil) for the pain.  Call us if the pain is severe or lasts more than 5 days.    Activity:  You should slowly go back to your normal activities after 24 hours.  Healing will take 4 to 6 weeks.  No driving for 3 days  Avoid climbing a ladder alone.  It is best to stay within 4 feet of the ground.  Avoid anything that may cause rough contact or a hard hit to your chest.  This includes football, hockey, and other contact sports.  Do not go swimming or boating alone.      For at least 4 weeks:  Do not raise your affected arm above your shoulder.  Do not use your affected arm to push, pull, or lift anything over 10 pounds.  Avoid repetitive upper body activities for 6 weeks (ie: golf, swimming, and weight lifting)    Follow Up Visits:  Return to the clinic in 7 to 10 days to have your device and wound checked.    Telling others about your device:  Before you have any medical tests or treatments, tell the doctors, dentists, and other care providers about your device.  There are a few tests and treatments that may interfere with your device.  (These include MRI, radiation therapy, electrocautery, and others.)  Your care team may need to take special steps to keep you safe.  Before you leave the hospital, you will receive a temporary ID card.  A permanent card will be mailed  to you about 6 to 8 weeks later.  Always carry the ID card with you.  It has important details about your device.  You should also get a MedicAlert ID.  Please ask us for a MedicAlert brochure, or go to [email or web address].    Safety near electrical equipment:  All of these are safe to use when in good repair:  Microwaves  Radios  Cordless phone  Remote controls  Small electrical tools  Cell phones: Keep cell phones at least 6 inches from your device.  Do not carry the phone in a pocket near your device.  Security shepard: It is okay to walk through security shepard at the airports and department stores.  Tell airport security that you have a defibrillator.  They should keep the screening wand at least 6 inches from your device.  Full-body scanners are safe.    Avoid the following:   MRI tests in the hospital unless you have a MRI safe defibrillator.   Arc welding, chain saws and high-powered industrial or commercial tools.   Power lines, power plants and large power generators.   Electric body fat scales.   Magnetic mattress pads or pillow.    What to do after a shock from your ICD:  Stop what you re doing and rest.  If you feel fine before and after the shock, call the device clinic.  If you feel unwell or receive more than one shock, call 911 or go to the emergency room.  A shock could mean that your condition has changed and you may need to see a doctor.    Questions?  Please call [facility]   Device Nurse:          Business Hours:  [phone]    After Hours:  [phone]   Choose option 4, then ask for the on-call device nurse at job code [phone].    Your next device clinic appointment is scheduled on:    Wednesday, March 8th 2023 at 11:30 AM.                                                 [facility]  [facility] [address]  [address] [place] [address]  [id number]    After you go home:  Have an adult stay with you for 24 hours.  Drink plenty of  fluids.  You may eat your normal diet, unless your doctor tells you otherwise.  For 24 hours:  - Relax and take it easy.  - Do NOT smoke.  - Do NOT make any important or legal decisions.  - Do NOT drive or operate machines at home or at work.  - Do NOT drink alcohol.

## 2023-02-28 NOTE — Clinical Note
Called to  [facility]. All questions answered. All belongings sent with patient. Patient transported to  via stretcher with CCL MIRYAM Flores from [employer] to see patient on 3C.

## 2023-02-28 NOTE — PROGRESS NOTES
D/I/A: Pt roomed on 3C in bay 31.  Arrived via litter and accompanied by RN  On/Off: Off monitor.  VSSA.  Rhythm upon arrival NSR on monitor.  Denies pain or sob.  Reviewed activity restrictions and when to notify RN, ie-changes to breathing or increased chest pressure or chest pain.  CCL access: Left chest site with primapore, CDI.  P: Continue to monitor status.  Discharge to home once meeting criteria.

## 2023-03-01 LAB
ATRIAL RATE - MUSE: 59 BPM
ATRIAL RATE - MUSE: 61 BPM
DIASTOLIC BLOOD PRESSURE - MUSE: NORMAL MMHG
DIASTOLIC BLOOD PRESSURE - MUSE: NORMAL MMHG
INTERPRETATION ECG - MUSE: NORMAL
INTERPRETATION ECG - MUSE: NORMAL
P AXIS - MUSE: 72 DEGREES
P AXIS - MUSE: 81 DEGREES
PR INTERVAL - MUSE: 118 MS
PR INTERVAL - MUSE: 148 MS
QRS DURATION - MUSE: 118 MS
QRS DURATION - MUSE: 124 MS
QT - MUSE: 418 MS
QT - MUSE: 452 MS
QTC - MUSE: 413 MS
QTC - MUSE: 455 MS
R AXIS - MUSE: 129 DEGREES
R AXIS - MUSE: 134 DEGREES
SYSTOLIC BLOOD PRESSURE - MUSE: NORMAL MMHG
SYSTOLIC BLOOD PRESSURE - MUSE: NORMAL MMHG
T AXIS - MUSE: 110 DEGREES
T AXIS - MUSE: 72 DEGREES
VENTRICULAR RATE- MUSE: 59 BPM
VENTRICULAR RATE- MUSE: 61 BPM

## 2023-03-05 LAB
MDC_IDC_LEAD_IMPLANT_DT: NORMAL
MDC_IDC_LEAD_IMPLANT_DT: NORMAL
MDC_IDC_LEAD_LOCATION: NORMAL
MDC_IDC_LEAD_LOCATION: NORMAL
MDC_IDC_LEAD_LOCATION_DETAIL_1: NORMAL
MDC_IDC_LEAD_LOCATION_DETAIL_1: NORMAL
MDC_IDC_LEAD_MFG: NORMAL
MDC_IDC_LEAD_MFG: NORMAL
MDC_IDC_LEAD_MODEL: NORMAL
MDC_IDC_LEAD_MODEL: NORMAL
MDC_IDC_LEAD_POLARITY_TYPE: NORMAL
MDC_IDC_LEAD_POLARITY_TYPE: NORMAL
MDC_IDC_LEAD_SERIAL: NORMAL
MDC_IDC_LEAD_SERIAL: NORMAL
MDC_IDC_LEAD_SPECIAL_FUNCTION: NORMAL
MDC_IDC_LEAD_SPECIAL_FUNCTION: NORMAL
MDC_IDC_MSMT_BATTERY_DTM: NORMAL
MDC_IDC_MSMT_BATTERY_RRT_TRIGGER: NORMAL
MDC_IDC_MSMT_BATTERY_STATUS: NORMAL
MDC_IDC_MSMT_BATTERY_VOLTAGE: 3.14 V
MDC_IDC_MSMT_CAP_CHARGE_ENERGY: 40 J
MDC_IDC_MSMT_CAP_CHARGE_TIME: 0 S
MDC_IDC_MSMT_CAP_CHARGE_TYPE: NORMAL
MDC_IDC_MSMT_LEADCHNL_RA_IMPEDANCE_VALUE: 589 OHM
MDC_IDC_MSMT_LEADCHNL_RA_PACING_THRESHOLD_AMPLITUDE: 0.5 V
MDC_IDC_MSMT_LEADCHNL_RA_PACING_THRESHOLD_PULSEWIDTH: 0.4 MS
MDC_IDC_MSMT_LEADCHNL_RA_SENSING_INTR_AMPL: 4.8 MV
MDC_IDC_MSMT_LEADCHNL_RV_IMPEDANCE_VALUE: 551 OHM
MDC_IDC_MSMT_LEADCHNL_RV_PACING_THRESHOLD_AMPLITUDE: 0.5 V
MDC_IDC_MSMT_LEADCHNL_RV_PACING_THRESHOLD_PULSEWIDTH: 0.4 MS
MDC_IDC_MSMT_LEADCHNL_RV_SENSING_INTR_AMPL: 9.3 MV
MDC_IDC_PG_IMPLANT_DTM: NORMAL
MDC_IDC_PG_MFG: NORMAL
MDC_IDC_PG_MODEL: NORMAL
MDC_IDC_PG_SERIAL: NORMAL
MDC_IDC_PG_TYPE: NORMAL
MDC_IDC_SESS_CLINIC_NAME: NORMAL
MDC_IDC_SESS_DTM: NORMAL
MDC_IDC_SESS_TYPE: NORMAL
MDC_IDC_SET_BRADY_AT_MODE_SWITCH_RATE: 171 {BEATS}/MIN
MDC_IDC_SET_BRADY_LOWRATE: 50 {BEATS}/MIN
MDC_IDC_SET_BRADY_MAX_SENSOR_RATE: 150 {BEATS}/MIN
MDC_IDC_SET_BRADY_MAX_TRACKING_RATE: 150 {BEATS}/MIN
MDC_IDC_SET_BRADY_MODE: NORMAL
MDC_IDC_SET_BRADY_PAV_DELAY_LOW: 180 MS
MDC_IDC_SET_BRADY_SAV_DELAY_LOW: 150 MS
MDC_IDC_SET_LEADCHNL_RA_PACING_AMPLITUDE: 3.5 V
MDC_IDC_SET_LEADCHNL_RA_PACING_ANODE_ELECTRODE_1: NORMAL
MDC_IDC_SET_LEADCHNL_RA_PACING_ANODE_LOCATION_1: NORMAL
MDC_IDC_SET_LEADCHNL_RA_PACING_CAPTURE_MODE: NORMAL
MDC_IDC_SET_LEADCHNL_RA_PACING_CATHODE_ELECTRODE_1: NORMAL
MDC_IDC_SET_LEADCHNL_RA_PACING_CATHODE_LOCATION_1: NORMAL
MDC_IDC_SET_LEADCHNL_RA_PACING_POLARITY: NORMAL
MDC_IDC_SET_LEADCHNL_RA_PACING_PULSEWIDTH: 0.4 MS
MDC_IDC_SET_LEADCHNL_RA_SENSING_ANODE_ELECTRODE_1: NORMAL
MDC_IDC_SET_LEADCHNL_RA_SENSING_ANODE_LOCATION_1: NORMAL
MDC_IDC_SET_LEADCHNL_RA_SENSING_CATHODE_ELECTRODE_1: NORMAL
MDC_IDC_SET_LEADCHNL_RA_SENSING_CATHODE_LOCATION_1: NORMAL
MDC_IDC_SET_LEADCHNL_RA_SENSING_POLARITY: NORMAL
MDC_IDC_SET_LEADCHNL_RA_SENSING_SENSITIVITY: 0.3 MV
MDC_IDC_SET_LEADCHNL_RV_PACING_AMPLITUDE: 3.5 V
MDC_IDC_SET_LEADCHNL_RV_PACING_ANODE_ELECTRODE_1: NORMAL
MDC_IDC_SET_LEADCHNL_RV_PACING_ANODE_LOCATION_1: NORMAL
MDC_IDC_SET_LEADCHNL_RV_PACING_CAPTURE_MODE: NORMAL
MDC_IDC_SET_LEADCHNL_RV_PACING_CATHODE_ELECTRODE_1: NORMAL
MDC_IDC_SET_LEADCHNL_RV_PACING_CATHODE_LOCATION_1: NORMAL
MDC_IDC_SET_LEADCHNL_RV_PACING_POLARITY: NORMAL
MDC_IDC_SET_LEADCHNL_RV_PACING_PULSEWIDTH: 0.4 MS
MDC_IDC_SET_LEADCHNL_RV_SENSING_ANODE_ELECTRODE_1: NORMAL
MDC_IDC_SET_LEADCHNL_RV_SENSING_ANODE_LOCATION_1: NORMAL
MDC_IDC_SET_LEADCHNL_RV_SENSING_CATHODE_ELECTRODE_1: NORMAL
MDC_IDC_SET_LEADCHNL_RV_SENSING_CATHODE_LOCATION_1: NORMAL
MDC_IDC_SET_LEADCHNL_RV_SENSING_POLARITY: NORMAL
MDC_IDC_SET_LEADCHNL_RV_SENSING_SENSITIVITY: 0.3 MV
MDC_IDC_SET_ZONE_DETECTION_BEATS_DENOMINATOR: 40 {BEATS}
MDC_IDC_SET_ZONE_DETECTION_BEATS_NUMERATOR: 30 {BEATS}
MDC_IDC_SET_ZONE_DETECTION_INTERVAL: 300 MS
MDC_IDC_SET_ZONE_DETECTION_INTERVAL: 350 MS
MDC_IDC_SET_ZONE_DETECTION_INTERVAL: 350 MS
MDC_IDC_SET_ZONE_DETECTION_INTERVAL: 360 MS
MDC_IDC_SET_ZONE_TYPE: NORMAL
MDC_IDC_STAT_AT_BURDEN_PERCENT: 0 %
MDC_IDC_STAT_AT_DTM_END: NORMAL
MDC_IDC_STAT_AT_DTM_START: NORMAL
MDC_IDC_STAT_BRADY_AP_VP_PERCENT: 0.25 %
MDC_IDC_STAT_BRADY_AP_VS_PERCENT: 0.35 %
MDC_IDC_STAT_BRADY_AS_VP_PERCENT: 62.35 %
MDC_IDC_STAT_BRADY_AS_VS_PERCENT: 37.04 %
MDC_IDC_STAT_BRADY_DTM_END: NORMAL
MDC_IDC_STAT_BRADY_DTM_START: NORMAL
MDC_IDC_STAT_BRADY_RA_PERCENT_PACED: 0.67 %
MDC_IDC_STAT_BRADY_RV_PERCENT_PACED: 62.6 %
MDC_IDC_STAT_CRT_DTM_END: NORMAL
MDC_IDC_STAT_CRT_DTM_START: NORMAL
MDC_IDC_STAT_EPISODE_RECENT_COUNT: 0
MDC_IDC_STAT_EPISODE_RECENT_COUNT_DTM_END: NORMAL
MDC_IDC_STAT_EPISODE_RECENT_COUNT_DTM_START: NORMAL
MDC_IDC_STAT_EPISODE_TOTAL_COUNT: 0
MDC_IDC_STAT_EPISODE_TOTAL_COUNT_DTM_END: NORMAL
MDC_IDC_STAT_EPISODE_TOTAL_COUNT_DTM_START: NORMAL
MDC_IDC_STAT_EPISODE_TYPE: NORMAL
MDC_IDC_STAT_TACHYTHERAPY_ATP_DELIVERED_RECENT: 0
MDC_IDC_STAT_TACHYTHERAPY_ATP_DELIVERED_TOTAL: 0
MDC_IDC_STAT_TACHYTHERAPY_RECENT_DTM_END: NORMAL
MDC_IDC_STAT_TACHYTHERAPY_RECENT_DTM_START: NORMAL
MDC_IDC_STAT_TACHYTHERAPY_SHOCKS_ABORTED_RECENT: 0
MDC_IDC_STAT_TACHYTHERAPY_SHOCKS_ABORTED_TOTAL: 0
MDC_IDC_STAT_TACHYTHERAPY_SHOCKS_DELIVERED_RECENT: 0
MDC_IDC_STAT_TACHYTHERAPY_SHOCKS_DELIVERED_TOTAL: 0
MDC_IDC_STAT_TACHYTHERAPY_TOTAL_DTM_END: NORMAL
MDC_IDC_STAT_TACHYTHERAPY_TOTAL_DTM_START: NORMAL

## 2023-03-08 ENCOUNTER — ANCILLARY PROCEDURE (OUTPATIENT)
Dept: CARDIOLOGY | Facility: CLINIC | Age: 43
End: 2023-03-08
Attending: INTERNAL MEDICINE
Payer: COMMERCIAL

## 2023-03-08 DIAGNOSIS — Q20.3 D-TGA (DEXTRO-TRANSPOSITION OF GREAT ARTERIES): ICD-10-CM

## 2023-03-08 DIAGNOSIS — I48.92 ATRIAL FLUTTER, UNSPECIFIED TYPE (H): ICD-10-CM

## 2023-03-08 DIAGNOSIS — I42.8 NICM (NONISCHEMIC CARDIOMYOPATHY) (H): ICD-10-CM

## 2023-03-08 PROCEDURE — 93283 PRGRMG EVAL IMPLANTABLE DFB: CPT | Performed by: INTERNAL MEDICINE

## 2023-03-08 NOTE — PATIENT INSTRUCTIONS
It was a pleasure to see you in clinic today. Please do not hesitate to call with any questions or concerns. We look forward to seeing you in clinic at your next device check on 6/23/23.    WILLEM McdermottN, RN  Electrophysiology Nurse Clinician  [facility]    During Business Hours Please Call:  [phone]  After Hours Please Call:  [phone] - select option #4 and ask for job code [phone]

## 2023-03-11 LAB
MDC_IDC_LEAD_IMPLANT_DT: NORMAL
MDC_IDC_LEAD_IMPLANT_DT: NORMAL
MDC_IDC_LEAD_LOCATION: NORMAL
MDC_IDC_LEAD_LOCATION: NORMAL
MDC_IDC_LEAD_LOCATION_DETAIL_1: NORMAL
MDC_IDC_LEAD_LOCATION_DETAIL_1: NORMAL
MDC_IDC_LEAD_MFG: NORMAL
MDC_IDC_LEAD_MFG: NORMAL
MDC_IDC_LEAD_MODEL: NORMAL
MDC_IDC_LEAD_MODEL: NORMAL
MDC_IDC_LEAD_POLARITY_TYPE: NORMAL
MDC_IDC_LEAD_POLARITY_TYPE: NORMAL
MDC_IDC_LEAD_SERIAL: NORMAL
MDC_IDC_LEAD_SERIAL: NORMAL
MDC_IDC_LEAD_SPECIAL_FUNCTION: NORMAL
MDC_IDC_LEAD_SPECIAL_FUNCTION: NORMAL
MDC_IDC_MSMT_BATTERY_DTM: NORMAL
MDC_IDC_MSMT_BATTERY_REMAINING_LONGEVITY: 157 MO
MDC_IDC_MSMT_BATTERY_RRT_TRIGGER: NORMAL
MDC_IDC_MSMT_BATTERY_STATUS: NORMAL
MDC_IDC_MSMT_BATTERY_VOLTAGE: 3.15 V
MDC_IDC_MSMT_CAP_CHARGE_ENERGY: 40 J
MDC_IDC_MSMT_CAP_CHARGE_TIME: 0 S
MDC_IDC_MSMT_CAP_CHARGE_TYPE: NORMAL
MDC_IDC_MSMT_LEADCHNL_RA_IMPEDANCE_VALUE: 494 OHM
MDC_IDC_MSMT_LEADCHNL_RA_PACING_THRESHOLD_AMPLITUDE: 0.5 V
MDC_IDC_MSMT_LEADCHNL_RA_PACING_THRESHOLD_PULSEWIDTH: 0.4 MS
MDC_IDC_MSMT_LEADCHNL_RA_SENSING_INTR_AMPL: 6.1 MV
MDC_IDC_MSMT_LEADCHNL_RV_IMPEDANCE_VALUE: 399 OHM
MDC_IDC_MSMT_LEADCHNL_RV_PACING_THRESHOLD_AMPLITUDE: 0.75 V
MDC_IDC_MSMT_LEADCHNL_RV_PACING_THRESHOLD_PULSEWIDTH: 0.4 MS
MDC_IDC_MSMT_LEADCHNL_RV_SENSING_INTR_AMPL: 8.5 MV
MDC_IDC_PG_IMPLANT_DTM: NORMAL
MDC_IDC_PG_MFG: NORMAL
MDC_IDC_PG_MODEL: NORMAL
MDC_IDC_PG_SERIAL: NORMAL
MDC_IDC_PG_TYPE: NORMAL
MDC_IDC_SESS_CLINIC_NAME: NORMAL
MDC_IDC_SESS_DTM: NORMAL
MDC_IDC_SESS_TYPE: NORMAL
MDC_IDC_SET_BRADY_AT_MODE_SWITCH_RATE: 171 {BEATS}/MIN
MDC_IDC_SET_BRADY_LOWRATE: 50 {BEATS}/MIN
MDC_IDC_SET_BRADY_MAX_SENSOR_RATE: 150 {BEATS}/MIN
MDC_IDC_SET_BRADY_MAX_TRACKING_RATE: 150 {BEATS}/MIN
MDC_IDC_SET_BRADY_MODE: NORMAL
MDC_IDC_SET_BRADY_PAV_DELAY_LOW: 180 MS
MDC_IDC_SET_BRADY_SAV_DELAY_LOW: 150 MS
MDC_IDC_SET_LEADCHNL_RA_PACING_AMPLITUDE: 3.5 V
MDC_IDC_SET_LEADCHNL_RA_PACING_ANODE_ELECTRODE_1: NORMAL
MDC_IDC_SET_LEADCHNL_RA_PACING_ANODE_LOCATION_1: NORMAL
MDC_IDC_SET_LEADCHNL_RA_PACING_CAPTURE_MODE: NORMAL
MDC_IDC_SET_LEADCHNL_RA_PACING_CATHODE_ELECTRODE_1: NORMAL
MDC_IDC_SET_LEADCHNL_RA_PACING_CATHODE_LOCATION_1: NORMAL
MDC_IDC_SET_LEADCHNL_RA_PACING_POLARITY: NORMAL
MDC_IDC_SET_LEADCHNL_RA_PACING_PULSEWIDTH: 0.4 MS
MDC_IDC_SET_LEADCHNL_RA_SENSING_ANODE_ELECTRODE_1: NORMAL
MDC_IDC_SET_LEADCHNL_RA_SENSING_ANODE_LOCATION_1: NORMAL
MDC_IDC_SET_LEADCHNL_RA_SENSING_CATHODE_ELECTRODE_1: NORMAL
MDC_IDC_SET_LEADCHNL_RA_SENSING_CATHODE_LOCATION_1: NORMAL
MDC_IDC_SET_LEADCHNL_RA_SENSING_POLARITY: NORMAL
MDC_IDC_SET_LEADCHNL_RA_SENSING_SENSITIVITY: 0.3 MV
MDC_IDC_SET_LEADCHNL_RV_PACING_AMPLITUDE: 3.5 V
MDC_IDC_SET_LEADCHNL_RV_PACING_ANODE_ELECTRODE_1: NORMAL
MDC_IDC_SET_LEADCHNL_RV_PACING_ANODE_LOCATION_1: NORMAL
MDC_IDC_SET_LEADCHNL_RV_PACING_CAPTURE_MODE: NORMAL
MDC_IDC_SET_LEADCHNL_RV_PACING_CATHODE_ELECTRODE_1: NORMAL
MDC_IDC_SET_LEADCHNL_RV_PACING_CATHODE_LOCATION_1: NORMAL
MDC_IDC_SET_LEADCHNL_RV_PACING_POLARITY: NORMAL
MDC_IDC_SET_LEADCHNL_RV_PACING_PULSEWIDTH: 0.4 MS
MDC_IDC_SET_LEADCHNL_RV_SENSING_ANODE_ELECTRODE_1: NORMAL
MDC_IDC_SET_LEADCHNL_RV_SENSING_ANODE_LOCATION_1: NORMAL
MDC_IDC_SET_LEADCHNL_RV_SENSING_CATHODE_ELECTRODE_1: NORMAL
MDC_IDC_SET_LEADCHNL_RV_SENSING_CATHODE_LOCATION_1: NORMAL
MDC_IDC_SET_LEADCHNL_RV_SENSING_POLARITY: NORMAL
MDC_IDC_SET_LEADCHNL_RV_SENSING_SENSITIVITY: 0.3 MV
MDC_IDC_SET_ZONE_DETECTION_BEATS_DENOMINATOR: 40 {BEATS}
MDC_IDC_SET_ZONE_DETECTION_BEATS_NUMERATOR: 30 {BEATS}
MDC_IDC_SET_ZONE_DETECTION_INTERVAL: 300 MS
MDC_IDC_SET_ZONE_DETECTION_INTERVAL: 350 MS
MDC_IDC_SET_ZONE_DETECTION_INTERVAL: 350 MS
MDC_IDC_SET_ZONE_DETECTION_INTERVAL: 360 MS
MDC_IDC_SET_ZONE_TYPE: NORMAL
MDC_IDC_STAT_AT_BURDEN_PERCENT: 0 %
MDC_IDC_STAT_AT_DTM_END: NORMAL
MDC_IDC_STAT_AT_DTM_START: NORMAL
MDC_IDC_STAT_BRADY_AP_VP_PERCENT: 0 %
MDC_IDC_STAT_BRADY_AP_VS_PERCENT: 0.49 %
MDC_IDC_STAT_BRADY_AS_VP_PERCENT: 0.05 %
MDC_IDC_STAT_BRADY_AS_VS_PERCENT: 99.45 %
MDC_IDC_STAT_BRADY_DTM_END: NORMAL
MDC_IDC_STAT_BRADY_DTM_START: NORMAL
MDC_IDC_STAT_BRADY_RA_PERCENT_PACED: 0.7 %
MDC_IDC_STAT_BRADY_RV_PERCENT_PACED: 0.05 %
MDC_IDC_STAT_CRT_DTM_END: NORMAL
MDC_IDC_STAT_CRT_DTM_START: NORMAL
MDC_IDC_STAT_EPISODE_RECENT_COUNT: 0
MDC_IDC_STAT_EPISODE_RECENT_COUNT_DTM_END: NORMAL
MDC_IDC_STAT_EPISODE_RECENT_COUNT_DTM_START: NORMAL
MDC_IDC_STAT_EPISODE_TOTAL_COUNT: 0
MDC_IDC_STAT_EPISODE_TOTAL_COUNT_DTM_END: NORMAL
MDC_IDC_STAT_EPISODE_TOTAL_COUNT_DTM_START: NORMAL
MDC_IDC_STAT_EPISODE_TYPE: NORMAL
MDC_IDC_STAT_TACHYTHERAPY_ATP_DELIVERED_RECENT: 0
MDC_IDC_STAT_TACHYTHERAPY_ATP_DELIVERED_TOTAL: 0
MDC_IDC_STAT_TACHYTHERAPY_RECENT_DTM_END: NORMAL
MDC_IDC_STAT_TACHYTHERAPY_RECENT_DTM_START: NORMAL
MDC_IDC_STAT_TACHYTHERAPY_SHOCKS_ABORTED_RECENT: 0
MDC_IDC_STAT_TACHYTHERAPY_SHOCKS_ABORTED_TOTAL: 0
MDC_IDC_STAT_TACHYTHERAPY_SHOCKS_DELIVERED_RECENT: 0
MDC_IDC_STAT_TACHYTHERAPY_SHOCKS_DELIVERED_TOTAL: 0
MDC_IDC_STAT_TACHYTHERAPY_TOTAL_DTM_END: NORMAL
MDC_IDC_STAT_TACHYTHERAPY_TOTAL_DTM_START: NORMAL

## 2023-03-12 NOTE — PROGRESS NOTES
REFERRING PROVIDER:  Dr. Padmaja Eaton of Vascular Surgery.      PRESENTING COMPLAINT:  Consultation for a right antecubital fossa mass.       HISTORY OF PRESENT ILLNESS:  Mr. Chen is 37 years old.  He has a significant past history of hepatorenal syndrome/failure requiring a liver/kidney transplant.  Patient was on hemodialysis for 2 years and had a right forearm AV fistula.  Recently, about 8 months ago, the fistula was not required anymore and therefore through an incision in the antecubital fossa, the fistula was tied off.  Over the last few months, he has developed a mass in the right antecubital fossa area.  It is painful to the touch but stable in size.  No numbness/tingling in the distal part of the hand.  No workup has been done.  He has been sent to me for further recommendations.      PAST MEDICAL HISTORY:  Atrial fibrillation status post ablation, hypothyroidism, history of liver and kidney transplant, history of alcohol abuse, depression, hypertension.      PAST SURGICAL HISTORY:  Kidney transplant, liver transplant, transposition of great arteries requiring repair as a child, multiple scopes, thyroidectomy.      MEDICATIONS:     1.  Amlodipine.    2.  Eliquis.    3.  Levothyroxine.    4.  Lisinopril.    5.  Metoprolol.    6.  Mycophenolate.    7.  Prednisone.    8.  Bactrim.    9.  Tacrolimus.      ALLERGIES:  Hydromorphone.      He used to smoke about a pack a day for multiple years, quit in 2000.  Is an alcoholic but has been off of all alcohol since 2014.  Works in a 's education.  Lives in [place].        REVIEW OF SYSTEMS:  Denies chest pain, shortness of breath, MI, CVA, DVT and PE.      PHYSICAL EXAMINATION:   VITAL SIGNS:  Stable.  He is afebrile, in no obvious distress.  He is 5 feet 10 inches, 192 pounds, BMI of 27.7 kg/m2.    EXTREMITIES:  On examination of his right arm, he has an approximately 4 x 2 cm ovoid palpable firm mass in the antecubital fossa.  It is nontender.  There  is no Tinel's.  He has a palpable brachial artery as well as a radial artery.  No swelling in the arm.  No evidence for vascular compromise.  It is warm, has hair growth.      ASSESSMENT AND PLAN:  Based on above findings, a diagnosis of palpable mass in the right antecubital fossa status post AV fistula tie-off 8 months ago was made.  I had a long discussion with the patient about the findings.  My concern is that could be a pseudoaneurysm and although there is no thrill or any pulsations in it, nonetheless, I think an ultrasound is warranted to not only look at the duplex of his vessels but also just to make sure and to see what this mass is.  We had that done today.  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  Given this finding, in my opinion, this needs to be dealt with by a vascular surgeon.  We have referred him back to Dr. Berry, as Dr. Eaton has subsequently a left our system.  I spoke to her nurse and he will be referred to her.  All questions were answered.  He was happy with the visit.  I will see him back on a p.r.n. basis.      Total time spent with patient 30 minutes, more than half was counseling.        no

## 2023-03-20 DIAGNOSIS — Z94.4 LIVER REPLACED BY TRANSPLANT (H): ICD-10-CM

## 2023-03-20 DIAGNOSIS — Z94.0 S/P KIDNEY TRANSPLANT: ICD-10-CM

## 2023-03-20 RX ORDER — TACROLIMUS 0.5 MG/1
CAPSULE ORAL
Qty: 90 CAPSULE | Refills: 3 | Status: SHIPPED | OUTPATIENT
Start: 2023-03-20 | End: 2024-03-18

## 2023-03-20 RX ORDER — SULFAMETHOXAZOLE AND TRIMETHOPRIM 400; 80 MG/1; MG/1
TABLET ORAL
Qty: 30 TABLET | Refills: 11 | Status: SHIPPED | OUTPATIENT
Start: 2023-03-20 | End: 2024-04-15

## 2023-04-03 DIAGNOSIS — Z94.0 KIDNEY TRANSPLANTED: ICD-10-CM

## 2023-04-03 DIAGNOSIS — Q20.3 COMPLETE TRANSPOSITION OF GREAT VESSELS: ICD-10-CM

## 2023-04-03 DIAGNOSIS — I48.92 ATRIAL FLUTTER, UNSPECIFIED TYPE (H): ICD-10-CM

## 2023-04-04 ENCOUNTER — LAB (OUTPATIENT)
Dept: LAB | Facility: CLINIC | Age: 43
End: 2023-04-04
Payer: COMMERCIAL

## 2023-04-04 DIAGNOSIS — Z94.4 LIVER REPLACED BY TRANSPLANT (H): ICD-10-CM

## 2023-04-04 LAB
ALBUMIN SERPL-MCNC: 4.3 G/DL (ref 3.4–5)
ALP SERPL-CCNC: 99 U/L (ref 40–150)
ALT SERPL W P-5'-P-CCNC: 46 U/L (ref 0–70)
ANION GAP SERPL CALCULATED.3IONS-SCNC: 1 MMOL/L (ref 3–14)
AST SERPL W P-5'-P-CCNC: 25 U/L (ref 0–45)
BILIRUB DIRECT SERPL-MCNC: <0.1 MG/DL (ref 0–0.2)
BILIRUB SERPL-MCNC: 0.4 MG/DL (ref 0.2–1.3)
BUN SERPL-MCNC: 16 MG/DL (ref 7–30)
CALCIUM SERPL-MCNC: 9.4 MG/DL (ref 8.5–10.1)
CHLORIDE BLD-SCNC: 110 MMOL/L (ref 94–109)
CO2 SERPL-SCNC: 30 MMOL/L (ref 20–32)
CREAT SERPL-MCNC: 1.51 MG/DL (ref 0.66–1.25)
ERYTHROCYTE [DISTWIDTH] IN BLOOD BY AUTOMATED COUNT: 12.8 % (ref 10–15)
GFR SERPL CREATININE-BSD FRML MDRD: 59 ML/MIN/1.73M2
GLUCOSE BLD-MCNC: 129 MG/DL (ref 70–99)
HCT VFR BLD AUTO: 48.9 % (ref 40–53)
HGB BLD-MCNC: 16.3 G/DL (ref 13.3–17.7)
MAGNESIUM SERPL-MCNC: 2.2 MG/DL (ref 1.6–2.3)
MCH RBC QN AUTO: 30.4 PG (ref 26.5–33)
MCHC RBC AUTO-ENTMCNC: 33.3 G/DL (ref 31.5–36.5)
MCV RBC AUTO: 91 FL (ref 78–100)
PLATELET # BLD AUTO: 145 10E3/UL (ref 150–450)
POTASSIUM BLD-SCNC: 4.1 MMOL/L (ref 3.4–5.3)
PROT SERPL-MCNC: 7.1 G/DL (ref 6.8–8.8)
RBC # BLD AUTO: 5.37 10E6/UL (ref 4.4–5.9)
SODIUM SERPL-SCNC: 141 MMOL/L (ref 133–144)
TACROLIMUS BLD-MCNC: 5.2 UG/L (ref 5–15)
TME LAST DOSE: NORMAL H
TME LAST DOSE: NORMAL H
WBC # BLD AUTO: 5.5 10E3/UL (ref 4–11)

## 2023-04-04 PROCEDURE — 80053 COMPREHEN METABOLIC PANEL: CPT

## 2023-04-04 PROCEDURE — 83735 ASSAY OF MAGNESIUM: CPT

## 2023-04-04 PROCEDURE — 80197 ASSAY OF TACROLIMUS: CPT

## 2023-04-04 PROCEDURE — 82248 BILIRUBIN DIRECT: CPT

## 2023-04-04 PROCEDURE — 36415 COLL VENOUS BLD VENIPUNCTURE: CPT

## 2023-04-04 PROCEDURE — 85027 COMPLETE CBC AUTOMATED: CPT

## 2023-04-06 NOTE — TELEPHONE ENCOUNTER
lisinopril (ZESTRIL) 20 MG tablet 90 tablet 0 1/4/2023         Last Written Prescription Date:  1-4-2023  Last Fill Quantity: 90,   # refills: 0  Last Office Visit : 11-  Future Office visit:  6-    Routing refill request to provider for review/approval because:  ELEVATED CREAT      Kathleen M Doege RN

## 2023-04-07 RX ORDER — LISINOPRIL 20 MG/1
TABLET ORAL
Qty: 90 TABLET | Refills: 3 | Status: SHIPPED | OUTPATIENT
Start: 2023-04-07 | End: 2024-03-21

## 2023-04-28 DIAGNOSIS — Z94.4 LIVER REPLACED BY TRANSPLANT (H): Primary | ICD-10-CM

## 2023-06-01 ENCOUNTER — HEALTH MAINTENANCE LETTER (OUTPATIENT)
Age: 43
End: 2023-06-01

## 2023-06-07 ENCOUNTER — LAB (OUTPATIENT)
Dept: LAB | Facility: CLINIC | Age: 43
End: 2023-06-07
Payer: COMMERCIAL

## 2023-06-07 DIAGNOSIS — Z94.4 LIVER REPLACED BY TRANSPLANT (H): ICD-10-CM

## 2023-06-07 LAB
ALBUMIN SERPL BCG-MCNC: 4.5 G/DL (ref 3.5–5.2)
ALP SERPL-CCNC: 74 U/L (ref 40–129)
ALT SERPL W P-5'-P-CCNC: 31 U/L (ref 10–50)
ANION GAP SERPL CALCULATED.3IONS-SCNC: 10 MMOL/L (ref 7–15)
AST SERPL W P-5'-P-CCNC: 27 U/L (ref 10–50)
BILIRUB DIRECT SERPL-MCNC: <0.2 MG/DL (ref 0–0.3)
BILIRUB SERPL-MCNC: 0.4 MG/DL
BUN SERPL-MCNC: 16.6 MG/DL (ref 6–20)
CALCIUM SERPL-MCNC: 10.1 MG/DL (ref 8.6–10)
CHLORIDE SERPL-SCNC: 104 MMOL/L (ref 98–107)
CREAT SERPL-MCNC: 1.47 MG/DL (ref 0.67–1.17)
DEPRECATED HCO3 PLAS-SCNC: 25 MMOL/L (ref 22–29)
ERYTHROCYTE [DISTWIDTH] IN BLOOD BY AUTOMATED COUNT: 12 % (ref 10–15)
GFR SERPL CREATININE-BSD FRML MDRD: 61 ML/MIN/1.73M2
GLUCOSE SERPL-MCNC: 110 MG/DL (ref 70–99)
HCT VFR BLD AUTO: 47.7 % (ref 40–53)
HGB BLD-MCNC: 16 G/DL (ref 13.3–17.7)
MAGNESIUM SERPL-MCNC: 1.7 MG/DL (ref 1.7–2.3)
MCH RBC QN AUTO: 29.9 PG (ref 26.5–33)
MCHC RBC AUTO-ENTMCNC: 33.5 G/DL (ref 31.5–36.5)
MCV RBC AUTO: 89 FL (ref 78–100)
PLATELET # BLD AUTO: 144 10E3/UL (ref 150–450)
POTASSIUM SERPL-SCNC: 4.6 MMOL/L (ref 3.4–5.3)
PROT SERPL-MCNC: 7.3 G/DL (ref 6.4–8.3)
RBC # BLD AUTO: 5.36 10E6/UL (ref 4.4–5.9)
SODIUM SERPL-SCNC: 139 MMOL/L (ref 136–145)
TACROLIMUS BLD-MCNC: 4.2 UG/L (ref 5–15)
TME LAST DOSE: ABNORMAL H
TME LAST DOSE: ABNORMAL H
WBC # BLD AUTO: 5.2 10E3/UL (ref 4–11)

## 2023-06-07 PROCEDURE — 85027 COMPLETE CBC AUTOMATED: CPT

## 2023-06-07 PROCEDURE — 36415 COLL VENOUS BLD VENIPUNCTURE: CPT

## 2023-06-07 PROCEDURE — 83735 ASSAY OF MAGNESIUM: CPT

## 2023-06-07 PROCEDURE — 82248 BILIRUBIN DIRECT: CPT

## 2023-06-07 PROCEDURE — 80197 ASSAY OF TACROLIMUS: CPT

## 2023-06-07 PROCEDURE — 80053 COMPREHEN METABOLIC PANEL: CPT

## 2023-06-23 ENCOUNTER — OFFICE VISIT (OUTPATIENT)
Dept: CARDIOLOGY | Facility: CLINIC | Age: 43
End: 2023-06-23
Attending: NURSE PRACTITIONER
Payer: COMMERCIAL

## 2023-06-23 ENCOUNTER — ANCILLARY PROCEDURE (OUTPATIENT)
Dept: CARDIOLOGY | Facility: CLINIC | Age: 43
End: 2023-06-23
Attending: INTERNAL MEDICINE
Payer: COMMERCIAL

## 2023-06-23 ENCOUNTER — OFFICE VISIT (OUTPATIENT)
Dept: CARDIOLOGY | Facility: CLINIC | Age: 43
End: 2023-06-23
Payer: COMMERCIAL

## 2023-06-23 VITALS
DIASTOLIC BLOOD PRESSURE: 82 MMHG | SYSTOLIC BLOOD PRESSURE: 127 MMHG | BODY MASS INDEX: 27.23 KG/M2 | WEIGHT: 194.5 LBS | OXYGEN SATURATION: 97 % | HEIGHT: 71 IN | HEART RATE: 65 BPM

## 2023-06-23 DIAGNOSIS — Q20.3 COMPLETE TRANSPOSITION OF GREAT VESSELS: ICD-10-CM

## 2023-06-23 DIAGNOSIS — I48.92 ATRIAL FLUTTER, UNSPECIFIED TYPE (H): ICD-10-CM

## 2023-06-23 DIAGNOSIS — I42.8 NICM (NONISCHEMIC CARDIOMYOPATHY) (H): ICD-10-CM

## 2023-06-23 DIAGNOSIS — Z94.0 KIDNEY TRANSPLANTED: ICD-10-CM

## 2023-06-23 DIAGNOSIS — Q20.3 D-TGA (DEXTRO-TRANSPOSITION OF GREAT ARTERIES): ICD-10-CM

## 2023-06-23 DIAGNOSIS — I48.92 ATRIAL FLUTTER, UNSPECIFIED TYPE (H): Primary | ICD-10-CM

## 2023-06-23 LAB
MDC_IDC_EPISODE_DTM: NORMAL
MDC_IDC_EPISODE_DURATION: 1 S
MDC_IDC_EPISODE_ID: 1
MDC_IDC_EPISODE_TYPE: NORMAL
MDC_IDC_LEAD_IMPLANT_DT: NORMAL
MDC_IDC_LEAD_IMPLANT_DT: NORMAL
MDC_IDC_LEAD_LOCATION: NORMAL
MDC_IDC_LEAD_LOCATION: NORMAL
MDC_IDC_LEAD_LOCATION_DETAIL_1: NORMAL
MDC_IDC_LEAD_LOCATION_DETAIL_1: NORMAL
MDC_IDC_LEAD_MFG: NORMAL
MDC_IDC_LEAD_MFG: NORMAL
MDC_IDC_LEAD_MODEL: NORMAL
MDC_IDC_LEAD_MODEL: NORMAL
MDC_IDC_LEAD_POLARITY_TYPE: NORMAL
MDC_IDC_LEAD_POLARITY_TYPE: NORMAL
MDC_IDC_LEAD_SERIAL: NORMAL
MDC_IDC_LEAD_SERIAL: NORMAL
MDC_IDC_LEAD_SPECIAL_FUNCTION: NORMAL
MDC_IDC_LEAD_SPECIAL_FUNCTION: NORMAL
MDC_IDC_MSMT_BATTERY_DTM: NORMAL
MDC_IDC_MSMT_BATTERY_REMAINING_LONGEVITY: 153 MO
MDC_IDC_MSMT_BATTERY_RRT_TRIGGER: NORMAL
MDC_IDC_MSMT_BATTERY_STATUS: NORMAL
MDC_IDC_MSMT_BATTERY_VOLTAGE: 3.09 V
MDC_IDC_MSMT_CAP_CHARGE_DTM: NORMAL
MDC_IDC_MSMT_CAP_CHARGE_ENERGY: 18 J
MDC_IDC_MSMT_CAP_CHARGE_TIME: 3.7 S
MDC_IDC_MSMT_CAP_CHARGE_TYPE: NORMAL
MDC_IDC_MSMT_LEADCHNL_RA_IMPEDANCE_VALUE: 551 OHM
MDC_IDC_MSMT_LEADCHNL_RA_PACING_THRESHOLD_AMPLITUDE: 0.75 V
MDC_IDC_MSMT_LEADCHNL_RA_PACING_THRESHOLD_PULSEWIDTH: 0.4 MS
MDC_IDC_MSMT_LEADCHNL_RA_SENSING_INTR_AMPL: 5.8 MV
MDC_IDC_MSMT_LEADCHNL_RV_IMPEDANCE_VALUE: 475 OHM
MDC_IDC_MSMT_LEADCHNL_RV_PACING_THRESHOLD_AMPLITUDE: 1 V
MDC_IDC_MSMT_LEADCHNL_RV_PACING_THRESHOLD_PULSEWIDTH: 0.4 MS
MDC_IDC_MSMT_LEADCHNL_RV_SENSING_INTR_AMPL: 11.8 MV
MDC_IDC_PG_IMPLANT_DTM: NORMAL
MDC_IDC_PG_MFG: NORMAL
MDC_IDC_PG_MODEL: NORMAL
MDC_IDC_PG_SERIAL: NORMAL
MDC_IDC_PG_TYPE: NORMAL
MDC_IDC_SESS_CLINIC_NAME: NORMAL
MDC_IDC_SESS_DTM: NORMAL
MDC_IDC_SESS_TYPE: NORMAL
MDC_IDC_SET_BRADY_AT_MODE_SWITCH_RATE: 171 {BEATS}/MIN
MDC_IDC_SET_BRADY_LOWRATE: 50 {BEATS}/MIN
MDC_IDC_SET_BRADY_MAX_SENSOR_RATE: 150 {BEATS}/MIN
MDC_IDC_SET_BRADY_MAX_TRACKING_RATE: 150 {BEATS}/MIN
MDC_IDC_SET_BRADY_MODE: NORMAL
MDC_IDC_SET_BRADY_PAV_DELAY_LOW: 180 MS
MDC_IDC_SET_BRADY_SAV_DELAY_LOW: 150 MS
MDC_IDC_SET_LEADCHNL_RA_PACING_AMPLITUDE: 3.5 V
MDC_IDC_SET_LEADCHNL_RA_PACING_ANODE_ELECTRODE_1: NORMAL
MDC_IDC_SET_LEADCHNL_RA_PACING_ANODE_LOCATION_1: NORMAL
MDC_IDC_SET_LEADCHNL_RA_PACING_CAPTURE_MODE: NORMAL
MDC_IDC_SET_LEADCHNL_RA_PACING_CATHODE_ELECTRODE_1: NORMAL
MDC_IDC_SET_LEADCHNL_RA_PACING_CATHODE_LOCATION_1: NORMAL
MDC_IDC_SET_LEADCHNL_RA_PACING_POLARITY: NORMAL
MDC_IDC_SET_LEADCHNL_RA_PACING_PULSEWIDTH: 0.4 MS
MDC_IDC_SET_LEADCHNL_RA_SENSING_ANODE_ELECTRODE_1: NORMAL
MDC_IDC_SET_LEADCHNL_RA_SENSING_ANODE_LOCATION_1: NORMAL
MDC_IDC_SET_LEADCHNL_RA_SENSING_CATHODE_ELECTRODE_1: NORMAL
MDC_IDC_SET_LEADCHNL_RA_SENSING_CATHODE_LOCATION_1: NORMAL
MDC_IDC_SET_LEADCHNL_RA_SENSING_POLARITY: NORMAL
MDC_IDC_SET_LEADCHNL_RA_SENSING_SENSITIVITY: 0.3 MV
MDC_IDC_SET_LEADCHNL_RV_PACING_AMPLITUDE: 3.5 V
MDC_IDC_SET_LEADCHNL_RV_PACING_ANODE_ELECTRODE_1: NORMAL
MDC_IDC_SET_LEADCHNL_RV_PACING_ANODE_LOCATION_1: NORMAL
MDC_IDC_SET_LEADCHNL_RV_PACING_CAPTURE_MODE: NORMAL
MDC_IDC_SET_LEADCHNL_RV_PACING_CATHODE_ELECTRODE_1: NORMAL
MDC_IDC_SET_LEADCHNL_RV_PACING_CATHODE_LOCATION_1: NORMAL
MDC_IDC_SET_LEADCHNL_RV_PACING_POLARITY: NORMAL
MDC_IDC_SET_LEADCHNL_RV_PACING_PULSEWIDTH: 0.4 MS
MDC_IDC_SET_LEADCHNL_RV_SENSING_ANODE_ELECTRODE_1: NORMAL
MDC_IDC_SET_LEADCHNL_RV_SENSING_ANODE_LOCATION_1: NORMAL
MDC_IDC_SET_LEADCHNL_RV_SENSING_CATHODE_ELECTRODE_1: NORMAL
MDC_IDC_SET_LEADCHNL_RV_SENSING_CATHODE_LOCATION_1: NORMAL
MDC_IDC_SET_LEADCHNL_RV_SENSING_POLARITY: NORMAL
MDC_IDC_SET_LEADCHNL_RV_SENSING_SENSITIVITY: 0.3 MV
MDC_IDC_SET_ZONE_DETECTION_BEATS_DENOMINATOR: 40 {BEATS}
MDC_IDC_SET_ZONE_DETECTION_BEATS_NUMERATOR: 30 {BEATS}
MDC_IDC_SET_ZONE_DETECTION_INTERVAL: 300 MS
MDC_IDC_SET_ZONE_DETECTION_INTERVAL: 350 MS
MDC_IDC_SET_ZONE_DETECTION_INTERVAL: 350 MS
MDC_IDC_SET_ZONE_DETECTION_INTERVAL: 360 MS
MDC_IDC_SET_ZONE_TYPE: NORMAL
MDC_IDC_STAT_AT_BURDEN_PERCENT: 0 %
MDC_IDC_STAT_AT_DTM_END: NORMAL
MDC_IDC_STAT_AT_DTM_START: NORMAL
MDC_IDC_STAT_BRADY_AP_VP_PERCENT: 0.01 %
MDC_IDC_STAT_BRADY_AP_VS_PERCENT: 0.99 %
MDC_IDC_STAT_BRADY_AS_VP_PERCENT: 0.03 %
MDC_IDC_STAT_BRADY_AS_VS_PERCENT: 98.97 %
MDC_IDC_STAT_BRADY_DTM_END: NORMAL
MDC_IDC_STAT_BRADY_DTM_START: NORMAL
MDC_IDC_STAT_BRADY_RA_PERCENT_PACED: 1.19 %
MDC_IDC_STAT_BRADY_RV_PERCENT_PACED: 0.04 %
MDC_IDC_STAT_CRT_DTM_END: NORMAL
MDC_IDC_STAT_CRT_DTM_START: NORMAL
MDC_IDC_STAT_EPISODE_RECENT_COUNT: 0
MDC_IDC_STAT_EPISODE_RECENT_COUNT: 1
MDC_IDC_STAT_EPISODE_RECENT_COUNT_DTM_END: NORMAL
MDC_IDC_STAT_EPISODE_RECENT_COUNT_DTM_START: NORMAL
MDC_IDC_STAT_EPISODE_TOTAL_COUNT: 0
MDC_IDC_STAT_EPISODE_TOTAL_COUNT: 1
MDC_IDC_STAT_EPISODE_TOTAL_COUNT_DTM_END: NORMAL
MDC_IDC_STAT_EPISODE_TOTAL_COUNT_DTM_START: NORMAL
MDC_IDC_STAT_EPISODE_TYPE: NORMAL
MDC_IDC_STAT_TACHYTHERAPY_ATP_DELIVERED_RECENT: 0
MDC_IDC_STAT_TACHYTHERAPY_ATP_DELIVERED_TOTAL: 0
MDC_IDC_STAT_TACHYTHERAPY_RECENT_DTM_END: NORMAL
MDC_IDC_STAT_TACHYTHERAPY_RECENT_DTM_START: NORMAL
MDC_IDC_STAT_TACHYTHERAPY_SHOCKS_ABORTED_RECENT: 0
MDC_IDC_STAT_TACHYTHERAPY_SHOCKS_ABORTED_TOTAL: 0
MDC_IDC_STAT_TACHYTHERAPY_SHOCKS_DELIVERED_RECENT: 0
MDC_IDC_STAT_TACHYTHERAPY_SHOCKS_DELIVERED_TOTAL: 0
MDC_IDC_STAT_TACHYTHERAPY_TOTAL_DTM_END: NORMAL
MDC_IDC_STAT_TACHYTHERAPY_TOTAL_DTM_START: NORMAL

## 2023-06-23 PROCEDURE — 93010 ELECTROCARDIOGRAM REPORT: CPT | Mod: 59 | Performed by: INTERNAL MEDICINE

## 2023-06-23 PROCEDURE — 99213 OFFICE O/P EST LOW 20 MIN: CPT | Mod: 25 | Performed by: NURSE PRACTITIONER

## 2023-06-23 PROCEDURE — 93283 PRGRMG EVAL IMPLANTABLE DFB: CPT | Performed by: INTERNAL MEDICINE

## 2023-06-23 PROCEDURE — 93005 ELECTROCARDIOGRAM TRACING: CPT

## 2023-06-23 PROCEDURE — G0463 HOSPITAL OUTPT CLINIC VISIT: HCPCS | Mod: 25 | Performed by: NURSE PRACTITIONER

## 2023-06-23 PROCEDURE — 99214 OFFICE O/P EST MOD 30 MIN: CPT | Mod: 25

## 2023-06-23 RX ORDER — METOPROLOL SUCCINATE 25 MG/1
25 TABLET, EXTENDED RELEASE ORAL 2 TIMES DAILY
Qty: 180 TABLET | Refills: 2 | Status: SHIPPED | OUTPATIENT
Start: 2023-06-23 | End: 2023-07-11

## 2023-06-23 ASSESSMENT — PAIN SCALES - GENERAL: PAINLEVEL: NO PAIN (0)

## 2023-06-23 NOTE — LETTER
2023      RE: Cricket Chen  [address]  [facility] [address]       Dear Colleague,    Thank you for the opportunity to participate in the care of your patient, Cricket Chen, at the [facility] [place] at [facility]. Please see a copy of my visit note below.        [facility] ELECTROPHYSIOLOG[facility] CLINIC VISIT    Assessment/Recommendations   Assessment/Plan:    Mr. Chen is a 42 year old male who has a past medical history significant for dTGA s/p modified Shoemaker operation and stitch closure of small VSD , AFL s/p DCCV and s/p ablation 2000, reduced systemic heart function, s/p ICD 3/7/23, liver and kidney transplant , post transplant lymphoproliferative disorder, papillary thyroid cancer s/p thyroidectomy, prior ETOH abuse (sober since ), anxiety, and depression. He presents today for follow up.     Atrial Flutter:   We discussed in detail with the patient management/treatment options for AFL includin. Stroke Prophylaxis:  CHADSVASC=+HF, +HTN  2, corresponding to a 2.2% annual stroke / systemic emolism event rate. indicating need for long term oral anticoagulation. He also has congenital anatomy which we would recommend anticoagulation. He is appropriately on Eliquis. No bleeding isues.    2. Rate Control: Continue Metoprolol XL 25 mg daily.   3. Rhythm Control: Cardioversion, Antiarrhythmics and/or ablation are options for rhythm control. He has previously been on Sotalol. Not currently on AATs. S/p DCCV 3/7/18  with history of Ablation at OSH in . Organized A.flutter is highly amenable to ablation procedure. An ablation can be considered for any recurrence.   4. Risk Factor Management: maintain tight BP control, and JUAN evaluation.       dTGA systemic EF 27%, NYHA I-II:  1. ACEi/ARB: Continue Lisiopril.  2. BB: Continue Metoprolol XL 25 mg daily.   3. Aldosterone antagonist: not required.  4. SCD  prophylaxis: discussed that systemic EF read 27% and is still low despite medical therapy, and relatively unchanged visually from prior imaging. We discussed an ICD both with patient and Dr Torres who is his primary [facility] physician. We discussed S-ICD versus TV-ICD. He elected to pursue TV ICD. Device site well healed. Normal device function with stable lead parameters, Continue routine device clinic follow-up.   5. Fluid status: euvolemic on exam     Follow up on an annual basis.        History of Present Illness/Subjective    Mr. Cricket Chen is a 42 year old male who comes in today for EP follow-up of [facility] dTGA and AFL.    Mr. Chen is a 42 year old male who has a past medical history significant for dTGA s/p modified Shoemaker operation and stitch closure of small VSD 1981, AFL s/p DCCV and s/p ablation 8/2000, reduced systemic heart function, s/p ICD 3/7/23, liver and kidney transplant 2016, post transplant lymphoproliferative disorder, papillary thyroid cancer s/p thyroidectomy, prior ETOH abuse (sober since 2014), anxiety, and depression. He presents today for follow up.      He presented to [facility] on 3/6/18 with shortness of breath and chest flutterings that started the day prior. He recalled it felt similar to when he was in AFL in the past. In the U, he was found to be in AFL. He was subsequently admitted. He was started on Eliquis and arranged for NEELIMA/DCCV. He had successful DCCV 3/7/18 and he was discharged. He states that he had AFL in the past. He believes he had a DCCV around age 7. He had also been maintained on Sotalol. He also reports a previous ablation at an OSH in 8/2000 (records not available to us at this time). He did well without AFL issues until 3/2018 when he had recurrence. Most recent echo from 3/30/18 showed systemic EF 35-40%, mild TR, and no evidence of baffle leaks/obstructions. A CMRI from OSH from 3/2017 showed no baffle  Obstruction and systemic EF 33%. He saw [facility] EP clinic  on 4/13/2018. He was doing well. He had not noted any further arrhythmias since his DCCV. He followed up in EP clinic in 7/2018 and was doing well without recurrent AFL.      EP Visit 3/2019: He presents today for follow up. He presented to [facility] on [facility]19 reporting some shortness of breath and palpitations. He was in sinus on presentation to ER. He was discharged on a zio patch monitor. Zio patch from 1/17/19-1/31/19 showed SR with rare ectopy and 7 NSVT up to 10 beats. 3 symptom activations showed sinus. An echo from 1/2019 showed moderately depressed Rv function, no obvious baffle obstructions or leaks. He reports feeling well and no recurrent palpitations. He denies any chest pain/pressures, dizziness, lightheadedness, worsening shortness of breath, leg/ankle swelling, PND, orthopnea, palpitations, or syncopal symptoms. Presenting 12 lead ECG shows SB Vent Rate 59 bpm,  ms,  ms, QTc 429 ms. Current cardiac medications include: Toprol XL, Eliquis, Lisinopril, and Norvasc.      EP Visit 3/13/20: He presents today for follow up. He reports feeling well. He denies any chest pain/pressures, dizziness, lightheadedness, worsening shortness of breath, leg/ankle swelling, PND, orthopnea, palpitations, or syncopal symptoms. Recent CMRI/MRA showed systemic EF 37%, normal baffles without obstruction, fatty metaplasia of sub-pulmonic ventricle and narrowing of intra-hepatic IVC which are unchanged. A zio patch from June 2019 showed SR with 7 NSVT episodes. Presenting 12 lead ECG shows NSR IRBBB Vent Rate 65 bpm,  ms,  ms, QTc 443 ms. Current cardiac medications include: Toprol XL, Eliquis, Lisinopril, and Norvasc.     EP Visit 11/23/21: He presents today for follow up. He reports feeling very well.  He is stable with no new symptoms, NYHA class II.  Echo today was reviewed and shows mod to sev systemic ventricle dysfunction but also considered no change from last echoA zio patch monitor from  8/16/21-8/30/21 showed 11 NSVT up to 10 beats and rare isolated ectopy with adequate HR distribution in sinus. Current cardiac medications include: Toprol XL, Eliquis, Lisinopril, and Norvasc.     EP Visit 11/25/22; He presents today for follow up. He reports feeling mild palpitations, not significant for patient, no presyncope, less than last year. functionally is very good, except if he goes for jog or run that he has to stop, does 30 min walks every other day with no limitations  No bleeding issues with Eliquis.. Echo today shows Moderate to severe right ventricular dilatation with moderate to severely decreased systolic function (no significant change compared to last echocardiogram). Mild tricuspid valve insufficiency. The pulmonary venous baffle is unobstructed; the systemic venous baffle is unobstructed (mean gradient of 3 mmHg). Qualitatively, normal left ventricular systolic function. Mild pulmonary valve insufficiency. Presenting 12 lead ECG shows NSR with PVC Vent Rate 70 bpm,  ms,  ms, QTc 450 ms. Current cardiac medications include: Toprol XL, Eliquis, Lisinopril.     He presents today for follow up. He had ICD implanted on 3/7/23. Device site well healed. He reports feeling well. He denies chest discomfort, palpitations, abdominal fullness/bloating or peripheral edema, shortness of breath, paroxysmal nocturnal dyspnea, orthopnea, lightheadedness, dizziness, pre-syncope, or syncope. Device interrogation shows normal device function, stable lead parameters, 1 NSVT 4 seconds, 11 seconds total AT/AF, and  <0.1%. Current cardiac medications include: Toprol XL, Eliquis, Lisinopril. .        Cardiographics (Personally Reviewed) :   TTE 11/25/22:  Patient after atrial switch operation for complete transposition of the great arteries. Technically difficult study due to poor acoustic windows. Moderate to severe right ventricular dilatation with moderate to severely decreased systolic function (no  significant change compared to last echocardiogram). Mild tricuspid valve insufficiency. The pulmonary venous baffle is unobstructed; the systemic venous baffle is unobstructed (mean gradient of 3 mmHg). Qualitatively, normal left ventricular systolic function. Mild pulmonary valve insufficiency    TTE 11/23/2021:  Patient after atrial switch operation for complete transposition of the great arteries. Technically difficult study due to poor acoustic windows. Moderate right ventricular dilatation and hypertrophy with moderate to severely decreased systolic function (no significant change compared to last echocardiogram). Mild tricuspid valve insufficiency. The pulmonary venous baffle is unobstructed; the systmic venous baffle was not well seen.Qualitatively, normal left ventricular systolic function. Mild pulmonary valve insufficiency.    3/30/18 ECHOCARDIOGRAM:   Patient after atrial switch operation for complete transposition of the great  arteries. Technically difficult study due to poor acoustic windows. No baffle  obstruction or leaks seen. There is moderate right ventricular enlargement.  Single plane right ventricular EF 35-40 %. Normal left ventricular systolic  function. No outflow obstruction. Mild tricuspid regurgitation.     3/2017 CMRI (OSH REPORT):  1. Transposition of the great arteries with native atrioventricular concordance and ventricular arterial discordance with the aorta anterior to the pulmonary artery (D-transposition of the great arteries).  2. Status post interatrial baffle with the superior vena cava and inferior vena cava baffled to the left (subpulmonic) ventricle without obstruction.  There is no evidence of a baffle leak.    3. Systemic right ventricle:  a. The right ventricle is hypertrophied.  b. Decreased systolic function with an ejection fraction of 33%.  c. Severe dilation of the right ventricle with an end-diastolic volume of 208 mL/m  (previously 188 mL/m ) and end-systolic  volume of 140 mL/m  (previous 123 mL/m ).  4. The right ventricle connects with the anterior aorta.  The coronary artery origins are usual for transposition.  Left-sided aortic arch with measurements above.  5. The pulmonary venous baffle to the right ventricle is widely patent.  6. The subpulmonary left ventricle has normal systolic function with decreased myocardial mass.  The left ventricle connects to the pulmonary artery, which is posterior to the aorta.  7. No significant change from previous cardiac MRI of 9/4/2012 except an increased size in the indexed right ventricular diastolic and systolic Volumes.    3/4/2020 CMRIMRA:  CONCLUSIONS: d-TGA with atrial baffle repair.  The systemic ventricle is moderately enlarged with severely reduced function, EF 27%. The subpulmonic ventricle is small in size with normal function, EF 57%. Normal baffles without any obstruction, thrombus or leakage. There is mild-moderate regurgitation of the systemic atrioventricular (tricuspid) valve. There is no residual intracardiac shunting (Qp/Qs 1.0).   The subpulmonic ventricle exhibits extensive fatty metaplasia in the mid and apical segments of unclear significance. Narrowing of intra-hepatic IVC of unclear significance.  Compared to the prior examination dated 03/09/2017 the systemic ventricle's function appears similar visually. The fatty metaplasia of sub-pulmonic ventricle and narrowing of intra-hepatic IVC  is unchanged from prior examinations.     1/2019 ECHO:  ------CONCLUSIONS------  Patient after atrial switch operation for complete transposition of the great arteries. Technically difficult study due to poor acoustic windows. There is moderate right ventricular enlargement. The right ventricular function is qualitatively moderately depressed. Qualitivley the right ventricle function is unchanged form the 3/30/2018 echo. Mild (1+) tricuspid valve insufficiency.No obvious baffle obstruction or leaks seen. Qualitatively  "normal left ventricular systolic function. Mild (1+) pulmonary valve insufficiency.Limited visualization of the LPA suggests narrowing. RPA not seen.       Physical Examination   /82 (BP Location: Right arm, Patient Position: Supine, Cuff Size: Adult Regular)   Pulse 65   Ht 1.797 m (5' 10.75\")   Wt 88.2 kg (194 lb 8 oz)   SpO2 97%   BMI 27.32 kg/m    Wt Readings from Last 3 Encounters:   02/28/23 88.5 kg (195 lb)   11/25/22 88.8 kg (195 lb 12.8 oz)   11/25/22 88.8 kg (195 lb 12.8 oz)     General Appearance:   Alert, well-appearing and in no acute distress.   HEENT: Atraumatic, normocephalic. PERRL.  MMM.   Chest/Lungs:   Respirations unlabored.  Lungs are clear to auscultation.   Cardiovascular:   Regular rate and rhythm.    Abdomen:  Soft, nontender, nondistended.   Extremities: No cyanosis or clubbing. No edema.    Musculoskeletal: Moves all extremities.  Gait normal.   Skin: Warm, dry, intact.    Neurologic: Mood and affect are appropriate.  Alert and oriented to person, place, time, and situation.          Medications  Allergies   Current Outpatient Medications   Medication Sig Dispense Refill    ELIQUIS ANTICOAGULANT 5 MG tablet TAKE ONE TABLET BY MOUTH TWICE A  tablet 2    levothyroxine (SYNTHROID/LEVOTHROID) 137 MCG tablet Take 1 tablet (137 mcg) by mouth daily 90 tablet 3    lisinopril (ZESTRIL) 20 MG tablet TAKE ONE TABLET BY MOUTH ONCE DAILY 90 tablet 3    LORazepam (ATIVAN) 1 MG tablet Take 1 tablet (1 mg) by mouth daily as needed (severe anxiety/panic/sleep) 10 tablet 0    magnesium oxide (MAG-OX) 400 MG tablet Take 1 tablet (400 mg) by mouth 2 times daily 120 tablet 11    metoprolol succinate ER (TOPROL XL) 25 MG 24 hr tablet TAKE ONE TABLET BY MOUTH TWICE A  tablet 2    mycophenolate (GENERIC EQUIVALENT) 250 MG capsule Take 2 capsules (500 mg) by mouth 2 times daily 120 capsule 11    sulfamethoxazole-trimethoprim (BACTRIM) 400-80 MG tablet TAKE ONE TABLET BY MOUTH ONCE DAILY " 30 tablet 11    tacrolimus (GENERIC EQUIVALENT) 0.5 MG capsule TAKE ONE CAPSULE BY MOUTH EVERY MORNING (TOTAL DOSE IS 1.5MG IN  THE MORNING AND 1MG IN THE EVENING) 90 capsule 3    tacrolimus (GENERIC EQUIVALENT) 1 MG capsule TAKE ONE CAPSULE BY MOUTH TWICE A DAY (TOTAL DOSE IS 1.5MG IN THE MORNING AND 1MG IN THE EVENING) 180 capsule 3    traZODone (DESYREL) 50 MG tablet Take 1 tablet (50 mg) by mouth at bedtime as needed, may repeat once for sleep 180 tablet 0    Allergies   Allergen Reactions    Hydromorphone Itching         Lab Results (Personally Reviewed)    Chemistry/lipid CBC Cardiac Enzymes/BNP/TSH/INR   Lab Results   Component Value Date    BUN 16.6 06/07/2023     06/07/2023    CO2 25 06/07/2023     Creatinine   Date Value Ref Range Status   06/07/2023 1.47 (H) 0.67 - 1.17 mg/dL Final   07/06/2021 1.27 (H) 0.66 - 1.25 mg/dL Final       Lab Results   Component Value Date    CHOL 172 11/01/2022    HDL 39 (L) 11/01/2022     (H) 11/01/2022      Lab Results   Component Value Date    WBC 5.2 06/07/2023    HGB 16.0 06/07/2023    HCT 47.7 06/07/2023    MCV 89 06/07/2023     (L) 06/07/2023    Lab Results   Component Value Date    TSH 0.33 (L) 11/01/2022    INR 1.13 10/30/2019        The patient states understanding and is agreeable with the plan.   DARYN Rios CNP  Electrophysiology Consult Service  Pager: [phone]

## 2023-06-23 NOTE — PROGRESS NOTES
[facility]    Assessment/Recommendations   Assessment/Plan:    Mr. Chen is a 42 year old male who has a past medical history significant for dTGA s/p modified Shoemaker operation and stitch closure of small VSD , AFL s/p DCCV and s/p ablation 2000, reduced systemic heart function, s/p ICD 3/7/23, liver and kidney transplant , post transplant lymphoproliferative disorder, papillary thyroid cancer s/p thyroidectomy, prior ETOH abuse (sober since ), anxiety, and depression. He presents today for follow up.     Atrial Flutter:   We discussed in detail with the patient management/treatment options for AFL includin. Stroke Prophylaxis:  CHADSVASC=+HF, +HTN  2, corresponding to a 2.2% annual stroke / systemic emolism event rate. indicating need for long term oral anticoagulation. He also has congenital anatomy which we would recommend anticoagulation. He is appropriately on Eliquis. No bleeding isues.    2. Rate Control: Continue Metoprolol XL 25 mg daily.   3. Rhythm Control: Cardioversion, Antiarrhythmics and/or ablation are options for rhythm control. He has previously been on Sotalol. Not currently on AATs. S/p DCCV 3/7/18  with history of Ablation at OSH in . Organized A.flutter is highly amenable to ablation procedure. An ablation can be considered for any recurrence.   4. Risk Factor Management: maintain tight BP control, and JUAN evaluation.       dTGA systemic EF 27%, NYHA I-II:  1. ACEi/ARB: Continue Lisiopril.  2. BB: Continue Metoprolol XL 25 mg daily.   3. Aldosterone antagonist: not required.  4. SCD prophylaxis: discussed that systemic EF read 27% and is still low despite medical therapy, and relatively unchanged visually from prior imaging. We discussed an ICD both with patient and Dr Torres who [facility]. We discussed S-ICD versus TV-ICD. He elected to pursue TV ICD. Device site well healed. Normal device function with stable lead  parameters, Continue routine device clinic follow-up.   5. Fluid status: euvolemic on exam     Follow up on an annual basis.        History of Present Illness/Subjective    Mr. Cricket Chen is a 42 year old male who comes in today for EP follow-up of [facility] dTGA and AFL.    Mr. Chen is a 42 year old male who has a past medical history significant for dTGA s/p modified Shoemaker operation and stitch closure of small VSD 1981, AFL s/p DCCV and s/p ablation 8/2000, reduced systemic heart function, s/p ICD 3/7/23, liver and kidney transplant 2016, post transplant lymphoproliferative disorder, papillary thyroid cancer s/p thyroidectomy, prior ETOH abuse (sober since 2014), anxiety, and depression. He presents today for follow up.      He presented to [facility] on 3/6/18 with shortness of breath and chest flutterings that started the day prior. He recalled it felt similar to when he was in AFL in the past. In the U, he was found to be in AFL. He was subsequently admitted. He was started on Eliquis and arranged for NEELIMA/DCCV. He had successful DCCV 3/7/18 and he was discharged. He states that he had AFL in the past. He believes he had a DCCV around age 7. He had also been maintained on Sotalol. He also reports a previous ablation at an OSH in 8/2000 (records not available to us at this time). He did well without AFL issues until 3/2018 when he had recurrence. Most recent echo from 3/30/18 showed systemic EF 35-40%, mild TR, and no evidence of baffle leaks/obstructions. A CMRI from OSH from 3/2017 showed no baffle  Obstruction and systemic EF 33%. He saw [facility] EP clinic on 4/13/2018. He was doing well. He had not noted any further arrhythmias since his DCCV. He followed up in EP clinic in 7/2018 and was doing well without recurrent AFL.      EP Visit 3/2019: He presents today for follow up. He presented to [facility] on 1/16/19 reporting some shortness of breath and palpitations. He was in sinus on presentation to ER. He was  discharged on a zio patch monitor. Zio patch from 1/17/19-1/31/19 showed SR with rare ectopy and 7 NSVT up to 10 beats. 3 symptom activations showed sinus. An echo from 1/2019 showed moderately depressed Rv function, no obvious baffle obstructions or leaks. He reports feeling well and no recurrent palpitations. He denies any chest pain/pressures, dizziness, lightheadedness, worsening shortness of breath, leg/ankle swelling, PND, orthopnea, palpitations, or syncopal symptoms. Presenting 12 lead ECG shows SB Vent Rate 59 bpm,  ms,  ms, QTc 429 ms. Current cardiac medications include: Toprol XL, Eliquis, Lisinopril, and Norvasc.      EP Visit 3/13/20: He presents today for follow up. He reports feeling well. He denies any chest pain/pressures, dizziness, lightheadedness, worsening shortness of breath, leg/ankle swelling, PND, orthopnea, palpitations, or syncopal symptoms. Recent CMRI/MRA showed systemic EF 37%, normal baffles without obstruction, fatty metaplasia of sub-pulmonic ventricle and narrowing of intra-hepatic IVC which are unchanged. A zio patch from June 2019 showed SR with 7 NSVT episodes. Presenting 12 lead ECG shows NSR IRBBB Vent Rate 65 bpm,  ms,  ms, QTc 443 ms. Current cardiac medications include: Toprol XL, Eliquis, Lisinopril, and Norvasc.     EP Visit 11/23/21: He presents today for follow up. He reports feeling very well.  He is stable with no new symptoms, NYHA class II.  Echo today was reviewed and shows mod to sev systemic ventricle dysfunction but also considered no change from last echoA zio patch monitor from 8/16/21-8/30/21 showed 11 NSVT up to 10 beats and rare isolated ectopy with adequate HR distribution in sinus. Current cardiac medications include: Toprol XL, Eliquis, Lisinopril, and Norvasc.     EP Visit 11/25/22; He presents today for follow up. He reports feeling mild palpitations, not significant for patient, no presyncope, less than last year.  functionally is very good, except if he goes for jog or run that he has to stop, does 30 min walks every other day with no limitations  No bleeding issues with Eliquis.. Echo today shows Moderate to severe right ventricular dilatation with moderate to severely decreased systolic function (no significant change compared to last echocardiogram). Mild tricuspid valve insufficiency. The pulmonary venous baffle is unobstructed; the systemic venous baffle is unobstructed (mean gradient of 3 mmHg). Qualitatively, normal left ventricular systolic function. Mild pulmonary valve insufficiency. Presenting 12 lead ECG shows NSR with PVC Vent Rate 70 bpm,  ms,  ms, QTc 450 ms. Current cardiac medications include: Toprol XL, Eliquis, Lisinopril.     He presents today for follow up. He had ICD implanted on 3/7/23. Device site well healed. He reports feeling well. He denies chest discomfort, palpitations, abdominal fullness/bloating or peripheral edema, shortness of breath, paroxysmal nocturnal dyspnea, orthopnea, lightheadedness, dizziness, pre-syncope, or syncope. Device interrogation shows normal device function, stable lead parameters, 1 NSVT 4 seconds, 11 seconds total AT/AF, and  <0.1%. Current cardiac medications include: Toprol XL, Eliquis, Lisinopril. .        Cardiographics (Personally Reviewed) :   TTE 11/25/22:  Patient after atrial switch operation for complete transposition of the great arteries. Technically difficult study due to poor acoustic windows. Moderate to severe right ventricular dilatation with moderate to severely decreased systolic function (no significant change compared to last echocardiogram). Mild tricuspid valve insufficiency. The pulmonary venous baffle is unobstructed; the systemic venous baffle is unobstructed (mean gradient of 3 mmHg). Qualitatively, normal left ventricular systolic function. Mild pulmonary valve insufficiency    TTE 11/23/2021:  Patient after atrial switch  operation for complete transposition of the great arteries. Technically difficult study due to poor acoustic windows. Moderate right ventricular dilatation and hypertrophy with moderate to severely decreased systolic function (no significant change compared to last echocardiogram). Mild tricuspid valve insufficiency. The pulmonary venous baffle is unobstructed; the systmic venous baffle was not well seen.Qualitatively, normal left ventricular systolic function. Mild pulmonary valve insufficiency.    3/30/18 ECHOCARDIOGRAM:   Patient after atrial switch operation for complete transposition of the great  arteries. Technically difficult study due to poor acoustic windows. No baffle  obstruction or leaks seen. There is moderate right ventricular enlargement.  Single plane right ventricular EF 35-40 %. Normal left ventricular systolic  function. No outflow obstruction. Mild tricuspid regurgitation.     3/2017 CMRI (OSH REPORT):  1. Transposition of the great arteries with native atrioventricular concordance and ventricular arterial discordance with the aorta anterior to the pulmonary artery (D-transposition of the great arteries).  2. Status post interatrial baffle with the superior vena cava and inferior vena cava baffled to the left (subpulmonic) ventricle without obstruction.  There is no evidence of a baffle leak.    3. Systemic right ventricle:  a. The right ventricle is hypertrophied.  b. Decreased systolic function with an ejection fraction of 33%.  c. Severe dilation of the right ventricle with an end-diastolic volume of 208 mL/m  (previously 188 mL/m ) and end-systolic volume of 140 mL/m  (previous 123 mL/m ).  4. The right ventricle connects with the anterior aorta.  The coronary artery origins are usual for transposition.  Left-sided aortic arch with measurements above.  5. The pulmonary venous baffle to the right ventricle is widely patent.  6. The subpulmonary left ventricle has normal systolic function with  "decreased myocardial mass.  The left ventricle connects to the pulmonary artery, which is posterior to the aorta.  7. No significant change from previous cardiac MRI of 9/4/2012 except an increased size in the indexed right ventricular diastolic and systolic Volumes.    3/4/2020 CMRIMRA:  CONCLUSIONS: d-TGA with atrial baffle repair.  The systemic ventricle is moderately enlarged with severely reduced function, EF 27%. The subpulmonic ventricle is small in size with normal function, EF 57%. Normal baffles without any obstruction, thrombus or leakage. There is mild-moderate regurgitation of the systemic atrioventricular (tricuspid) valve. There is no residual intracardiac shunting (Qp/Qs 1.0).   The subpulmonic ventricle exhibits extensive fatty metaplasia in the mid and apical segments of unclear significance. Narrowing of intra-hepatic IVC of unclear significance.  Compared to the prior examination dated 03/09/2017 the systemic ventricle's function appears similar visually. The fatty metaplasia of sub-pulmonic ventricle and narrowing of intra-hepatic IVC  is unchanged from prior examinations.     1/2019 ECHO:  ------CONCLUSIONS------  Patient after atrial switch operation for complete transposition of the great arteries. Technically difficult study due to poor acoustic windows. There is moderate right ventricular enlargement. The right ventricular function is qualitatively moderately depressed. Qualitivley the right ventricle function is unchanged form the 3/30/2018 echo. Mild (1+) tricuspid valve insufficiency.No obvious baffle obstruction or leaks seen. Qualitatively normal left ventricular systolic function. Mild (1+) pulmonary valve insufficiency.Limited visualization of the LPA suggests narrowing. RPA not seen.       Physical Examination   /82 (BP Location: Right arm, Patient Position: Supine, Cuff Size: Adult Regular)   Pulse 65   Ht 1.797 m (5' 10.75\")   Wt 88.2 kg (194 lb 8 oz)   SpO2 97%   BMI " 27.32 kg/m    Wt Readings from Last 3 Encounters:   02/28/23 88.5 kg (195 lb)   11/25/22 88.8 kg (195 lb 12.8 oz)   11/25/22 88.8 kg (195 lb 12.8 oz)     General Appearance:   Alert, well-appearing and in no acute distress.   HEENT: Atraumatic, normocephalic. PERRL.  MMM.   Chest/Lungs:   Respirations unlabored.  Lungs are clear to auscultation.   Cardiovascular:   Regular rate and rhythm.    Abdomen:  Soft, nontender, nondistended.   Extremities: No cyanosis or clubbing. No edema.    Musculoskeletal: Moves all extremities.  Gait normal.   Skin: Warm, dry, intact.    Neurologic: Mood and affect are appropriate.  Alert and oriented to person, place, time, and situation.          Medications  Allergies   Current Outpatient Medications   Medication Sig Dispense Refill     ELIQUIS ANTICOAGULANT 5 MG tablet TAKE ONE TABLET BY MOUTH TWICE A  tablet 2     levothyroxine (SYNTHROID/LEVOTHROID) 137 MCG tablet Take 1 tablet (137 mcg) by mouth daily 90 tablet 3     lisinopril (ZESTRIL) 20 MG tablet TAKE ONE TABLET BY MOUTH ONCE DAILY 90 tablet 3     LORazepam (ATIVAN) 1 MG tablet Take 1 tablet (1 mg) by mouth daily as needed (severe anxiety/panic/sleep) 10 tablet 0     magnesium oxide (MAG-OX) 400 MG tablet Take 1 tablet (400 mg) by mouth 2 times daily 120 tablet 11     metoprolol succinate ER (TOPROL XL) 25 MG 24 hr tablet TAKE ONE TABLET BY MOUTH TWICE A  tablet 2     mycophenolate (GENERIC EQUIVALENT) 250 MG capsule Take 2 capsules (500 mg) by mouth 2 times daily 120 capsule 11     sulfamethoxazole-trimethoprim (BACTRIM) 400-80 MG tablet TAKE ONE TABLET BY MOUTH ONCE DAILY 30 tablet 11     tacrolimus (GENERIC EQUIVALENT) 0.5 MG capsule TAKE ONE CAPSULE BY MOUTH EVERY MORNING (TOTAL DOSE IS 1.5MG IN  THE MORNING AND 1MG IN THE EVENING) 90 capsule 3     tacrolimus (GENERIC EQUIVALENT) 1 MG capsule TAKE ONE CAPSULE BY MOUTH TWICE A DAY (TOTAL DOSE IS 1.5MG IN THE MORNING AND 1MG IN THE EVENING) 180 capsule 3      traZODone (DESYREL) 50 MG tablet Take 1 tablet (50 mg) by mouth at bedtime as needed, may repeat once for sleep 180 tablet 0    Allergies   Allergen Reactions     Hydromorphone Itching         Lab Results (Personally Reviewed)    Chemistry/lipid CBC Cardiac Enzymes/BNP/TSH/INR   Lab Results   Component Value Date    BUN 16.6 06/07/2023     06/07/2023    CO2 25 06/07/2023     Creatinine   Date Value Ref Range Status   06/07/2023 1.47 (H) 0.67 - 1.17 mg/dL Final   07/06/2021 1.27 (H) 0.66 - 1.25 mg/dL Final       Lab Results   Component Value Date    CHOL 172 11/01/2022    HDL 39 (L) 11/01/2022     (H) 11/01/2022      Lab Results   Component Value Date    WBC 5.2 06/07/2023    HGB 16.0 06/07/2023    HCT 47.7 06/07/2023    MCV 89 06/07/2023     (L) 06/07/2023    Lab Results   Component Value Date    TSH 0.33 (L) 11/01/2022    INR 1.13 10/30/2019        The patient states understanding and is agreeable with the plan.   DARYN Rios CNP  Electrophysiology Consult Service  Pager: [phone]

## 2023-06-23 NOTE — PATIENT INSTRUCTIONS
You were seen today in the Adult Congenital and Cardiovascular Genetics Clinic at the [facility].    Cardiology Providers you saw during your visit:  Rah Torres MD and DARYN Rios    Diagnosis: D-TGA    Results:  Rah Torres MD and DARYN Rios reviewed the results of your EKG and device check testing today in clinic.    Recommendations for you:    Refilled your metoprolol and Eliquis       General Cardiac Recommendations:  Continue to eat a heart healthy, low salt diet.  Continue to get 20-30 minutes of aerobic activity, 4-5 days per week.  Examples of aerobic activity include walking, running, swimming, cycling, etc.  Continue to observe good oral hygiene, with regular dental visits.      SBE prophylaxis:   Yes____  No__X__    If YES is checked, follow the recommendations outlined below:  Take antibiotic(s) prior to recommended dental procedures and procedures on the respiratory tract or with infected skin, muscle or bones. SBE prophylaxis is not needed for routine GI and  procedures (ie. Colonoscopy or vaginal delivery)  Observe good oral hygiene daily, as advised by your dentist. Get regular professional dental care.  Keep cuts clean.  Infections should be treated promptly.  Symptoms of Infective Endocarditis could include: fever lasting more than 4-5 days or a recurrent fever that initially resolves but returns within 1-2 days)      Exercise restrictions:   Yes__X__  No____         If yes, list restrictions:  Must be allowed to rest if fatigued or SOB      FASTING CHOLESTEROL was checked in the last 5 years YES_X__  NO___ (2021)  If no, please follow up with your primary care physician. You should have a cholesterol screening every 5 years.      Follow-up: Follow up with Dr. Torres and Emi Saleem NP in 1 year with a device check, echo and ekg prior.     If you have questions or concerns please contact us at:    Analisa Cerna, MPH, RN, BSN   [facility] (Scheduling)  Nurse Care  Coordinator     Clinic   Adult Congenital and CV Genetics   Adult Congenital and CV Genetic  [facility]   [facility]  (P) [phone]     (P) [phone]  [email or web address]  (F) [phone]        For after hours urgent needs, call [phone] and ask to speak to the Adult Congenital Physician on call.  Mention Job Code 0401.    For emergencies call 911.    [facility]  [facility]   Clinics and [facility] Center  Mail Code [id number]  [address] [place]  [address]

## 2023-06-23 NOTE — PROGRESS NOTES
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 IS seeing Dr. George from  today as well.     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 He has had atrial tachycardia with cardioversion in 2018 and ablation in 2000.  He has reduced systemic function.  s/p liver and kidney transplant for EtOH abuse in 2016, post-transplant lymphoproliferative disorder, papillary thyroid cancer s/p thryoidectomy clear at 5 years who presents today for ongoing evaluation and management.  Pt reports that he has had no significant illnesses or hospitalizations since his last visit. No shortness of breath He denies any chest pain orthopnea, pnd, syncope/presyncope, or suzette. Slight  Decrease in exercise tolerance over time.   He denies any problems with his medications. Sober for many years.        PMH: Cardiac Hx as above  Past Medical History:   Diagnosis Date     Alcohol abuse     Last drink in Mid-April 2014     Anemia in ESRD (end-stage renal disease) (H)      Anxiety 2008     Atrial flutter (H) 2017     Cirrhosis (H)     S/P liver transplant     Depression      History of blood transfusion      History of transposition of great vessels     atrial switch at age 8 months old     Hypertension      Liver transplant recipient (H)     2016     Papillary thyroid carcinoma (H)      Pneumonia 11-15-14     Renal transplant recipient     2016     Varices, esophageal (H)        Past Surgical History:   Procedure Laterality Date     ANESTHESIA CARDIOVERSION N/A 3/7/2018    Procedure: ANESTHESIA CARDIOVERSION;  Cardioversion;  Surgeon: GENERIC ANESTHESIA PROVIDER;  Location:  OR     [facility] N/A 5/10/2016     Procedure: BENCH LIVER;  Surgeon: Ricky Deshpande MD;  [facility]: [facility] OR     BIOPSY LYMPH NODE CERVICAL Right 1/26/2017    Procedure: BIOPSY LYMPH NODE CERVICAL;  Surgeon: Beak Soto MD;  Location:  OR     CARDIAC SURGERY  9-10-80     CREATE FISTULA ARTERIOVENOUS UPPER EXTREMITY Right 9/16/2014    Procedure: CREATE FISTULA ARTERIOVENOUS UPPER EXTREMITY;  Surgeon: Padmaja Eaton MD;  [facility]     CV RIGHT HEART CATH MEASUREMENTS RECORDED N/A 4/19/2019    Procedure: CV RIGHT HEART CATH;  Surgeon: Sabi Wiggins MD;  Location:  HEART CARDIAC CATH LAB     CYSTOSCOPY, REMOVE STENT(S), COMBINED Right 6/22/2016    Procedure: COMBINED CYSTOSCOPY, REMOVE STENT(S);  Surgeon: Ricky Deshpande MD;  Location:  OR     EMBOLECTOMY UPPER EXTREMITY Right 8/17/2018    Procedure: EMBOLECTOMY UPPER EXTREMITY;  Repair Right Upper Arm Pseudo Aneursym ;  Surgeon: John Payton MD;  Location:  OR     ENT SURGERY       EP INSERT ICD Left 2/28/2023    Procedure: Insert Implantable Cardioverter-Defibrillator (ICD);  Surgeon: Jaylen George MD;  Location:  HEART CARDIAC CATH LAB     ESOPHAGOSCOPY, GASTROSCOPY, DUODENOSCOPY (EGD), COMBINED  5/30/2014    Procedure: COMBINED ESOPHAGOSCOPY, GASTROSCOPY, DUODENOSCOPY (EGD);  Surgeon: Guillaume Bautista MD;  Location:  GI     ESOPHAGOSCOPY, GASTROSCOPY, DUODENOSCOPY (EGD), COMBINED  9/30/14     ESOPHAGOSCOPY, GASTROSCOPY, DUODENOSCOPY (EGD), COMBINED Left 3/12/2015    Procedure: COMBINED ESOPHAGOSCOPY, GASTROSCOPY, DUODENOSCOPY (EGD);  Surgeon: Laureen Galvan MD;  Location:  GI     ESOPHAGOSCOPY, GASTROSCOPY, DUODENOSCOPY (EGD), COMBINED N/A 4/21/2016    Procedure: COMBINED ESOPHAGOSCOPY, GASTROSCOPY, DUODENOSCOPY (EGD);  Surgeon: Laureen Galvan MD;  Location:  GI     ESOPHAGOSCOPY, GASTROSCOPY, DUODENOSCOPY (EGD), COMBINED N/A 7/21/2016    Procedure: COMBINED ESOPHAGOSCOPY, GASTROSCOPY, DUODENOSCOPY (EGD);  Surgeon:  Laureen Galvan MD;  [facility] GI     [facility] OR CATH ABLATION NON-CARDIAC ENDOVASCULAR      SVT     INSERT PORT VASCULAR ACCESS N/A 4/3/2017    Procedure: INSERT PORT VASCULAR ACCESS;  Surgeon: Rajendra Jacques PA-C;  [facility]     IR PORT REMOVAL LEFT  2019     LIGATE FISTULA ARTERIOVENOUS UPPER EXTREMITY Right 2017    Procedure: LIGATE FISTULA ARTERIOVENOUS UPPER EXTREMITY;  Revision of Right Arm Arteriovenous Fistula ;  Surgeon: Padmaja Eaton MD;  [facility]     PERCUTANEOUS BIOPSY KIDNEY Right 2018    Procedure: PERCUTANEOUS BIOPSY KIDNEY;  Right Kidney Biopsy;  Surgeon: Yuval Khan MD;  [facility]     PERCUTANEOUS BIOPSY KIDNEY Right 10/30/2019    Procedure: Right Side Kidney Biopsy;  Surgeon: Jake Sidhu MD;  [facility]     PICC INSERTION  14    PICC line placement 14; Removal 2014     REMOVE PORT VASCULAR ACCESS Right 2019    Procedure: Right chest Port Removal;  Surgeon: Erasmo Ziegler PA-C;  Location:  OR     THORACIC SURGERY  1980    Transposition great arteries, repaired at 8 months     THYROIDECTOMY Right 2017    Procedure: THYROIDECTOMY;  Bilateral Total Thyroidectomy ;  Surgeon: Beka Soto MD;  [facility]     TRANSPLANT KIDNEY RECIPIENT  DONOR  5/10/2016    Procedure: TRANSPLANT KIDNEY RECIPIENT  DONOR;  Surgeon: Ricky Deshpande MD;  Location:  OR     TRANSPLANT LIVER RECIPIENT  DONOR N/A 5/10/2016    Procedure: TRANSPLANT LIVER RECIPIENT  DONOR;  Surgeon: Ricky Deshpande MD;  [facility] OR   As above, including HPI    Family History   Problem Relation Age of Onset     No Known Problems Brother      Arthritis Father      Hypertension Father      Hypertension Mother      Anxiety Disorder Mother      Hyperlipidemia Mother      Hypertension Sister      Anxiety Disorder Sister      No Known Problems Maternal Grandmother      No Known  Problems Maternal Grandfather      No Known Problems Paternal Grandmother      No Known Problems Brother      No Known Problems Paternal Grandfather      Alcohol/Drug No family hx of      Gastrointestinal Disease No family hx of         no fam hx of liver disease or liver cancer       Meds  ELIQUIS ANTICOAGULANT 5 MG tablet, TAKE ONE TABLET BY MOUTH TWICE A DAY  levothyroxine (SYNTHROID/LEVOTHROID) 137 MCG tablet, Take 1 tablet (137 mcg) by mouth daily  lisinopril (ZESTRIL) 20 MG tablet, TAKE ONE TABLET BY MOUTH ONCE DAILY  LORazepam (ATIVAN) 1 MG tablet, Take 1 tablet (1 mg) by mouth daily as needed (severe anxiety/panic/sleep)  magnesium oxide (MAG-OX) 400 MG tablet, Take 1 tablet (400 mg) by mouth 2 times daily  metoprolol succinate ER (TOPROL XL) 25 MG 24 hr tablet, TAKE ONE TABLET BY MOUTH TWICE A DAY  mycophenolate (GENERIC EQUIVALENT) 250 MG capsule, Take 2 capsules (500 mg) by mouth 2 times daily  sulfamethoxazole-trimethoprim (BACTRIM) 400-80 MG tablet, TAKE ONE TABLET BY MOUTH ONCE DAILY  tacrolimus (GENERIC EQUIVALENT) 0.5 MG capsule, TAKE ONE CAPSULE BY MOUTH EVERY MORNING (TOTAL DOSE IS 1.5MG IN  THE MORNING AND 1MG IN THE EVENING)  tacrolimus (GENERIC EQUIVALENT) 1 MG capsule, TAKE ONE CAPSULE BY MOUTH TWICE A DAY (TOTAL DOSE IS 1.5MG IN THE MORNING AND 1MG IN THE EVENING)  traZODone (DESYREL) 50 MG tablet, Take 1 tablet (50 mg) by mouth at bedtime as needed, may repeat once for sleep    No current facility-administered medications on file prior to visit.      Allergies   Allergen Reactions     Hydromorphone Itching       Vitals: There were no vitals taken for this visit.  BMI= There is no height or weight on file to calculate BMI.     General: appears comfortable, alert and articulate  Head: normocephalic, atraumatic  Eyes: anicteric sclera, EOMI  Neck: no adenopathy or masses, 2+ carotids without bruits  Orophyarynx: moist mucosa, no lesions, dentition intact  Heart: regular, normal S1, loud S2, no  murmur, gallop, or rub,   Lungs: clear, no rales or wheezing  Abdomen: soft, non-tender, bowel sounds present, no hepatomegaly  Extremities: no clubbing, cyanosis or edema  Neurological: normal speech and affect, no gross motor deficits    Echo today:  Moderate to severely reduced function of systemic ventricle, no baffle obstruction.     Clinical history: 42 M d-TGA s/p baffle repair, repaired VSD, combined liver/renal transplant, depressed  systemic ventricle function, fatty metaplasia of sub-pulmonic ventricle and narrowing of intra-hepatic IVC    Comparison CMR: 03/2020     1. The subpulmonic (morphologic left) ventricle is small in cavity size. The subpulmonic ventricle exhibits  wall thinning with extensive fatty metaplasia in the mid and apical segments. The global systolic function  of the subpulmonic ventricle is normal. The subpulmonic ventricle's EF is 57%. There are no regional wall  motion abnormalities. The VSD repair is visualized in the basal septum and is intact. QpQs is 1.1.      2. The systemic (morphologic right) ventricle is moderately enlarged with severe hypertrophy. The global  systolic function of the systemic ventricle is moderately reduced. The systemic ventricle's EF is 35%.     3. There is a single SVC and a single IVC.  Both drain unobstructed into the sub-pulmonic ventricle via  baffles. The intrahepatic segment of the native IVC is mildly narrowed. There does not appear to be any  baffle obstruction. Baffle leak cannot be assessed on this study (contrast MRA not done).      4. There is mild-moderate regurgitation of the systemic atrioventricular (tricuspid) valve. There is mild  FARHANA of the sub-pulmonic AV valve (mitral) without septal contact or obstruction. There is mild PI.      5. Late gadolinium enhancement imaging shows extensive enhancement in the sub-pulmonic ventricle in a near  circumferential pattern in the mid and apical segments related to fatty metaplasia.      6. There  is no pericardial effusion or thickening.     7. There is no intracardiac thrombus.     CONCLUSIONS: d-TGA with atrial baffle repair.  The systemic ventricle is moderately enlarged with  moderately reduced function, EF 35%. The subpulmonic ventricle is small in size with normal function, EF  57%. Normal baffles without any obstruction or thrombus. There is mild-moderate regurgitation of the  systemic atrioventricular (tricuspid) valve. The subpulmonic ventricle exhibits extensive fatty metaplasia  and LGE in the mid and apical segments.   Compared to the prior examination from 2020, the systemic ventricle's function appears similar  to mildly improved.      CORE EXAM      CMR Report 3-   SUMMARY  Clinical history:39-year old male with a history of d-TGA (with baffle repair), small VSD (closed with a  stitch) and combined liver/renal transplant. He is known to have depressed systemic ventricle function. CMR  to evaluate RV function.   Comparison CMR: 03/09/2017 ([facility])  SITUS: There is normal situs The liver is in the right upper quadrant and a single spleen in the left  upper quadrant.   CAVAE: There is a single SVC and a single IVC.both drain unobstructed into the sub-pulmonic ventricle via  baffle.  SVC baffle measures 1.6 x 1.0 cm. IVC baffle measures 1.5 x 1.4 cm. The intrahepatic segment of  the native IVC measures 13.9 x 5.3 mm at its narrowest point. There does not appear to be any baffle  obstruction or leakage.   PULMONARY VEINS: There are four pulmonary veins with two left-sided veins and two right-sided veins.They  drain in an unobstructed fashion via baffle to the systemic ventricle.   ATRIOVENTRICULAR CONNECTION: The atrioventricular connection is discordant. There is mild-moderate  regurgitation of the systemic atrioventricular (tricuspid) valve. There is mild FARHANA of the sub-pulmonic AV  valve (mitral) without septal contact or obstruction.   VENTRICLES: There is  levocardia. The systemic (morphologic right) ventricle is moderately enlarged with  severe hypertrophy. The global systolic function of the systemic ventricle is severely reduced. The  systemic ventricle's EF is 27%. The subpulmonic (morphologic left) ventricle is smallï cavity size. The  subpulmonic ventricle exhibits wall thinning and a pattern of fatty metaplasia with enhancement on LGE  imaging in the mid-anterolateral, mid anteroseptal, apical septal, and apical lateral segments. The global  systolic function of the subpulmonic ventricle is normal. The subpulmonic ventricle's EF is 57%. There are  no regional wall motion abnormalities. The VSD repair is visualized in the basal septum and is intact.  There is no evidence of intracardiac shunting (Qp/Qs 1.0).  VENTRICULOARTERIAL CONNECTION: The ventriculoarterial connection is discordant.There is no significant  valvular disease.   AORTA AND SUPRA-AORTIC VESSELS: The aortic arch is left-sided with normal branching of the vessels.   PULMONARY ARTERY: The main pulmonary artery is mildly enlarged. The branch pulmonary arteries are normal in  size.   CONCLUSIONS: d-TGA with atrial baffle repair.  The systemic ventricle is moderately enlarged with severely  reduced function, EF 27%. The subpulmonic ventricle is small in size with normal function, EF 57%. Normal  baffles without any obstruction, thrombus or leakage. There is mild-moderate regurgitation of the systemic  atrioventricular (tricuspid) valve. There is no residual intracardiac shunting (Qp/Qs 1.0).   The subpulmonic ventricle exhibits extensive fatty metaplasia in the mid and apical segments of unclear  significance. Narrowing of intra-hepatic IVC of unclear significance.   Compared to the prior examination dated 03/09/2017 the systemic ventricle's function appears similar  visually. The fatty metaplasia of sub-pulmonic ventricle and narrowing of intra-hepatic IVC  is unchanged  from prior examinations.        Echo 11/23/21  Patient after atrial switch operation for complete transposition of the great  arteries. Technically difficult study due to poor acoustic windows. Moderate  right ventricular dilatation and hypertrophy with moderate to severely  decreased systolic function (no significant change compared to last  echocardiogram). Mild tricuspid valve insufficiency. The pulmonary venous  baffle is unobstructed; the systmic venous baffle was not well seen.  Qualitatively, normal left ventricular systolic function. Mild pulmonary valve  Insufficiency.      Assessment and Plan:  43 yo M with history of d - transposition of the great vessels s/p baffle repair and suture closure of small VSD in 1981, atrial flutter s/p ablation, with depressed systemic RV function.  s/p liver and kidney transplant for EtOH abuse, post-transplant lymphoproliferative disorder, papillary thyroid cancer in remission  s/p thryoidectomy presents for ongoing evaluation and management.    1.  NO change in medications  2. Get regular activity  3. Follow up with Dr. Torres and Emi Saleem NP in 1 year with a device check, echo and ekg prior.       Rah Torres M.D.   of Pediatrics  Pediatric and Adult Congenital Cardiology  [facility]  Pediatric Cardiology Office [phone]  Adult Congenital Cardiology Triage and Scheduling [phone]    A total of 30 minutes were spent in personal review of testing, including MRI, review of documented history and interval events in chart, and face to face visit with discussion of findings and plan.       Answers for HPI/ROS submitted by the patient on 6/17/2023  General Symptoms: No  Skin Symptoms: No  HENT Symptoms: No  EYE SYMPTOMS: No  HEART SYMPTOMS: No  LUNG SYMPTOMS: No  INTESTINAL SYMPTOMS: No  URINARY SYMPTOMS: No  REPRODUCTIVE SYMPTOMS: No  SKELETAL SYMPTOMS: No  BLOOD SYMPTOMS: No  NERVOUS SYSTEM  SYMPTOMS: No  MENTAL HEALTH SYMPTOMS: No

## 2023-06-23 NOTE — PATIENT INSTRUCTIONS
It was a pleasure to see you in clinic today. Please do not hesitate to call with any questions or concerns.     MIGDALIA Paulino, RN  Electrophysiology Nurse Clinician  [facility]  During business hours call:  [phone]  Urgent needs after hours- please call: [phone]- select option #4 and ask for job code [id number].

## 2023-06-23 NOTE — NURSING NOTE
Chief Complaint   Patient presents with     Follow Up     Return Congenital Heart- 42 year old male with history of D-TGA s/p atrial switch (modified Henrik 4/23/81), a flutter s/p DCCV, chest pain, and shortness of breath presenting for follow up     Vitals were taken, medications reconciled, and EKG was performed.    JASIEL Floers  1:36 PM

## 2023-06-23 NOTE — LETTER
6/23/2023      RE: Cricket Chen  [address]  [facility] [place] [address]       Dear Colleague,    Thank you for the opportunity to participate in the care of your patient, Cricket Chen, at the [facility] HEART CLINIC at [facility]. Please see a copy of my visit note below.    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 IS seeing Dr. George from  today as well.     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 He has had atrial tachycardia with cardioversion in 2018 and ablation in 2000.  He has reduced systemic function.  s/p liver and kidney transplant for EtOH abuse in 2016, post-transplant lymphoproliferative disorder, papillary thyroid cancer s/p thryoidectomy clear at 5 years who presents today for ongoing evaluation and management.  Pt reports that he has had no significant illnesses or hospitalizations since his last visit. No shortness of breath He denies any chest pain orthopnea, pnd, syncope/presyncope, or suzette. Slight  Decrease in exercise tolerance over time.   He denies any problems with his medications. Sober for many years.        PMH: Cardiac Hx as above  Past Medical History:   Diagnosis Date    Alcohol abuse     Last drink in Mid-April 2014    Anemia in ESRD (end-stage renal disease) (H)     Anxiety 2008    Atrial flutter (H) 2017    Cirrhosis (H)     S/P liver transplant    Depression     History of blood transfusion     History of transposition of great vessels     atrial switch at age 8 months old    Hypertension     Liver transplant recipient (H)     2016    Papillary thyroid carcinoma (H)      Pneumonia 11-15-14    Renal transplant recipient     2016    Varices, esophageal (H)        Past Surgical History:   Procedure Laterality Date    ANESTHESIA CARDIOVERSION N/A 3/7/2018    Procedure: ANESTHESIA CARDIOVERSION;  Cardioversion;  Surgeon: GENERIC ANESTHESIA PROVIDER;  Location:  OR    BENCH LIVER N/A 5/10/2016    Procedure: BENCH LIVER;  Surgeon: Ricky Deshpande MD;  [facility]    BIOPSY LYMPH NODE CERVICAL Right 1/26/2017    Procedure: BIOPSY LYMPH NODE CERVICAL;  Surgeon: Beka Soto MD;  [facility]: [facility] OR    CARDIAC SURGERY  9-10-80    CREATE FISTULA ARTERIOVENOUS UPPER EXTREMITY Right 9/16/2014    Procedure: CREATE FISTULA ARTERIOVENOUS UPPER EXTREMITY;  Surgeon: Padmaja Eaton MD;  Location:  OR    CV RIGHT HEART CATH MEASUREMENTS RECORDED N/A 4/19/2019    Procedure: CV RIGHT HEART CATH;  Surgeon: aSbi Wiggins MD;  Location:  HEART CARDIAC CATH LAB    CYSTOSCOPY, REMOVE STENT(S), COMBINED Right 6/22/2016    Procedure: COMBINED CYSTOSCOPY, REMOVE STENT(S);  Surgeon: Ricky Deshpande MD;  Location: [facility] OR    EMBOLECTOMY UPPER EXTREMITY Right 8/17/2018    Procedure: EMBOLECTOMY UPPER EXTREMITY;  Repair Right Upper Arm Pseudo Aneursym ;  Surgeon: John Payton MD;  Location:  OR    ENT SURGERY      EP INSERT ICD Left 2/28/2023    Procedure: Insert Implantable Cardioverter-Defibrillator (ICD);  Surgeon: Jaylen George MD;  Location:  HEART CARDIAC CATH LAB    ESOPHAGOSCOPY, GASTROSCOPY, DUODENOSCOPY (EGD), COMBINED  5/30/2014    Procedure: COMBINED ESOPHAGOSCOPY, GASTROSCOPY, DUODENOSCOPY (EGD);  Surgeon: Guillaume Bautista MD;  Location:  GI    ESOPHAGOSCOPY, GASTROSCOPY, DUODENOSCOPY (EGD), COMBINED  9/30/14    ESOPHAGOSCOPY, GASTROSCOPY, DUODENOSCOPY (EGD), COMBINED Left 3/12/2015    Procedure: COMBINED ESOPHAGOSCOPY, GASTROSCOPY, DUODENOSCOPY (EGD);  Surgeon: Laureen Galvan MD;  Location:  GI    ESOPHAGOSCOPY, GASTROSCOPY, DUODENOSCOPY  (EGD), COMBINED N/A 2016    Procedure: COMBINED ESOPHAGOSCOPY, GASTROSCOPY, DUODENOSCOPY (EGD);  Surgeon: Laureen Galvan MD;  [facility] GI    ESOPHAGOSCOPY, GASTROSCOPY, DUODENOSCOPY (EGD), COMBINED N/A 2016    Procedure: COMBINED ESOPHAGOSCOPY, GASTROSCOPY, DUODENOSCOPY (EGD);  Surgeon: Laureen Galvan MD;  Location: [facility] GI    HC OR CATH ABLATION NON-CARDIAC ENDOVASCULAR      SVT    INSERT PORT VASCULAR ACCESS N/A 4/3/2017    Procedure: INSERT PORT VASCULAR ACCESS;  Surgeon: Rajendra Jacques PA-C;  [facility] OR    IR PORT REMOVAL LEFT  2019    LIGATE FISTULA ARTERIOVENOUS UPPER EXTREMITY Right 2017    Procedure: LIGATE FISTULA ARTERIOVENOUS UPPER EXTREMITY;  Revision of Right Arm Arteriovenous Fistula ;  Surgeon: Padmaja Eaton MD;  [facility]    PERCUTANEOUS BIOPSY KIDNEY Right 2018    Procedure: PERCUTANEOUS BIOPSY KIDNEY;  Right Kidney Biopsy;  Surgeon: Yuval Khan MD;  [facility]    PERCUTANEOUS BIOPSY KIDNEY Right 10/30/2019    Procedure: Right Side Kidney Biopsy;  Surgeon: Jake Sidhu MD;  [facility]    PICC INSERTION  14    PICC line placement 14; Removal 2014    REMOVE PORT VASCULAR ACCESS Right 2019    Procedure: Right chest Port Removal;  Surgeon: Erasmo Ziegler PA-C;  [facility]: [facility] OR    THORACIC SURGERY  1980    Transposition great arteries, repaired at 8 months    THYROIDECTOMY Right 2017    Procedure: THYROIDECTOMY;  Bilateral Total Thyroidectomy ;  Surgeon: Beka Soto MD;  [facility]    TRANSPLANT KIDNEY RECIPIENT  DONOR  5/10/2016    Procedure: TRANSPLANT KIDNEY RECIPIENT  DONOR;  Surgeon: Ricky Deshpande MD;  [facility]    TRANSPLANT LIVER RECIPIENT  DONOR N/A 5/10/2016    Procedure: TRANSPLANT LIVER RECIPIENT  DONOR;  Surgeon: Ricky Deshpande MD;  [facility]   As above, including HPI    Family History    Problem Relation Age of Onset    No Known Problems Brother     Arthritis Father     Hypertension Father     Hypertension Mother     Anxiety Disorder Mother     Hyperlipidemia Mother     Hypertension Sister     Anxiety Disorder Sister     No Known Problems Maternal Grandmother     No Known Problems Maternal Grandfather     No Known Problems Paternal Grandmother     No Known Problems Brother     No Known Problems Paternal Grandfather     Alcohol/Drug No family hx of     Gastrointestinal Disease No family hx of         no fam hx of liver disease or liver cancer       Meds  ELIQUIS ANTICOAGULANT 5 MG tablet, TAKE ONE TABLET BY MOUTH TWICE A DAY  levothyroxine (SYNTHROID/LEVOTHROID) 137 MCG tablet, Take 1 tablet (137 mcg) by mouth daily  lisinopril (ZESTRIL) 20 MG tablet, TAKE ONE TABLET BY MOUTH ONCE DAILY  LORazepam (ATIVAN) 1 MG tablet, Take 1 tablet (1 mg) by mouth daily as needed (severe anxiety/panic/sleep)  magnesium oxide (MAG-OX) 400 MG tablet, Take 1 tablet (400 mg) by mouth 2 times daily  metoprolol succinate ER (TOPROL XL) 25 MG 24 hr tablet, TAKE ONE TABLET BY MOUTH TWICE A DAY  mycophenolate (GENERIC EQUIVALENT) 250 MG capsule, Take 2 capsules (500 mg) by mouth 2 times daily  sulfamethoxazole-trimethoprim (BACTRIM) 400-80 MG tablet, TAKE ONE TABLET BY MOUTH ONCE DAILY  tacrolimus (GENERIC EQUIVALENT) 0.5 MG capsule, TAKE ONE CAPSULE BY MOUTH EVERY MORNING (TOTAL DOSE IS 1.5MG IN  THE MORNING AND 1MG IN THE EVENING)  tacrolimus (GENERIC EQUIVALENT) 1 MG capsule, TAKE ONE CAPSULE BY MOUTH TWICE A DAY (TOTAL DOSE IS 1.5MG IN THE MORNING AND 1MG IN THE EVENING)  traZODone (DESYREL) 50 MG tablet, Take 1 tablet (50 mg) by mouth at bedtime as needed, may repeat once for sleep    No current facility-administered medications on file prior to visit.      Allergies   Allergen Reactions    Hydromorphone Itching       Vitals: There were no vitals taken for this visit.  BMI= There is no height or weight on file to  calculate BMI.     General: appears comfortable, alert and articulate  Head: normocephalic, atraumatic  Eyes: anicteric sclera, EOMI  Neck: no adenopathy or masses, 2+ carotids without bruits  Orophyarynx: moist mucosa, no lesions, dentition intact  Heart: regular, normal S1, loud S2, no murmur, gallop, or rub,   Lungs: clear, no rales or wheezing  Abdomen: soft, non-tender, bowel sounds present, no hepatomegaly  Extremities: no clubbing, cyanosis or edema  Neurological: normal speech and affect, no gross motor deficits    Echo today:  Moderate to severely reduced function of systemic ventricle, no baffle obstruction.     Clinical history: 42 M d-TGA s/p baffle repair, repaired VSD, combined liver/renal transplant, depressed  systemic ventricle function, fatty metaplasia of sub-pulmonic ventricle and narrowing of intra-hepatic IVC    Comparison CMR: 03/2020     1. The subpulmonic (morphologic left) ventricle is small in cavity size. The subpulmonic ventricle exhibits  wall thinning with extensive fatty metaplasia in the mid and apical segments. The global systolic function  of the subpulmonic ventricle is normal. The subpulmonic ventricle's EF is 57%. There are no regional wall  motion abnormalities. The VSD repair is visualized in the basal septum and is intact. QpQs is 1.1.      2. The systemic (morphologic right) ventricle is moderately enlarged with severe hypertrophy. The global  systolic function of the systemic ventricle is moderately reduced. The systemic ventricle's EF is 35%.     3. There is a single SVC and a single IVC.  Both drain unobstructed into the sub-pulmonic ventricle via  baffles. The intrahepatic segment of the native IVC is mildly narrowed. There does not appear to be any  baffle obstruction. Baffle leak cannot be assessed on this study (contrast MRA not done).      4. There is mild-moderate regurgitation of the systemic atrioventricular (tricuspid) valve. There is mild  FARHANA of the  sub-pulmonic AV valve (mitral) without septal contact or obstruction. There is mild PI.      5. Late gadolinium enhancement imaging shows extensive enhancement in the sub-pulmonic ventricle in a near  circumferential pattern in the mid and apical segments related to fatty metaplasia.      6. There is no pericardial effusion or thickening.     7. There is no intracardiac thrombus.     CONCLUSIONS: d-TGA with atrial baffle repair.  The systemic ventricle is moderately enlarged with  moderately reduced function, EF 35%. The subpulmonic ventricle is small in size with normal function, EF  57%. Normal baffles without any obstruction or thrombus. There is mild-moderate regurgitation of the  systemic atrioventricular (tricuspid) valve. The subpulmonic ventricle exhibits extensive fatty metaplasia  and LGE in the mid and apical segments.   Compared to the prior examination from 2020, the systemic ventricle's function appears similar  to mildly improved.      CORE EXAM      CMR Report 3-   SUMMARY  Clinical history:39-year old male with a history of d-TGA (with baffle repair), small VSD (closed with a  stitch) and combined liver/renal transplant. He is known to have depressed systemic ventricle function. CMR  to evaluate RV function.   Comparison CMR: 03/09/2017 ([facility])  SITUS: There is normal situs The liver is in the right upper quadrant and a single spleen in the left  upper quadrant.   CAVAE: There is a single SVC and a single IVC.both drain unobstructed into the sub-pulmonic ventricle via  baffle.  SVC baffle measures 1.6 x 1.0 cm. IVC baffle measures 1.5 x 1.4 cm. The intrahepatic segment of  the native IVC measures 13.9 x 5.3 mm at its narrowest point. There does not appear to be any baffle  obstruction or leakage.   PULMONARY VEINS: There are four pulmonary veins with two left-sided veins and two right-sided veins.They  drain in an unobstructed fashion via baffle to the systemic  ventricle.   ATRIOVENTRICULAR CONNECTION: The atrioventricular connection is discordant. There is mild-moderate  regurgitation of the systemic atrioventricular (tricuspid) valve. There is mild FARHANA of the sub-pulmonic AV  valve (mitral) without septal contact or obstruction.   VENTRICLES: There is levocardia. The systemic (morphologic right) ventricle is moderately enlarged with  severe hypertrophy. The global systolic function of the systemic ventricle is severely reduced. The  systemic ventricle's EF is 27%. The subpulmonic (morphologic left) ventricle is smallï cavity size. The  subpulmonic ventricle exhibits wall thinning and a pattern of fatty metaplasia with enhancement on LGE  imaging in the mid-anterolateral, mid anteroseptal, apical septal, and apical lateral segments. The global  systolic function of the subpulmonic ventricle is normal. The subpulmonic ventricle's EF is 57%. There are  no regional wall motion abnormalities. The VSD repair is visualized in the basal septum and is intact.  There is no evidence of intracardiac shunting (Qp/Qs 1.0).  VENTRICULOARTERIAL CONNECTION: The ventriculoarterial connection is discordant.There is no significant  valvular disease.   AORTA AND SUPRA-AORTIC VESSELS: The aortic arch is left-sided with normal branching of the vessels.   PULMONARY ARTERY: The main pulmonary artery is mildly enlarged. The branch pulmonary arteries are normal in  size.   CONCLUSIONS: d-TGA with atrial baffle repair.  The systemic ventricle is moderately enlarged with severely  reduced function, EF 27%. The subpulmonic ventricle is small in size with normal function, EF 57%. Normal  baffles without any obstruction, thrombus or leakage. There is mild-moderate regurgitation of the systemic  atrioventricular (tricuspid) valve. There is no residual intracardiac shunting (Qp/Qs 1.0).   The subpulmonic ventricle exhibits extensive fatty metaplasia in the mid and apical segments of  unclear  significance. Narrowing of intra-hepatic IVC of unclear significance.   Compared to the prior examination dated 03/09/2017 the systemic ventricle's function appears similar  visually. The fatty metaplasia of sub-pulmonic ventricle and narrowing of intra-hepatic IVC  is unchanged  from prior examinations.       Echo 11/23/21  Patient after atrial switch operation for complete transposition of the great  arteries. Technically difficult study due to poor acoustic windows. Moderate  right ventricular dilatation and hypertrophy with moderate to severely  decreased systolic function (no significant change compared to last  echocardiogram). Mild tricuspid valve insufficiency. The pulmonary venous  baffle is unobstructed; the systmic venous baffle was not well seen.  Qualitatively, normal left ventricular systolic function. Mild pulmonary valve  Insufficiency.      Assessment and Plan:  43 yo M with history of d - transposition of the great vessels s/p baffle repair and suture closure of small VSD in 1981, atrial flutter s/p ablation, with depressed systemic RV function.  s/p liver and kidney transplant for EtOH abuse, post-transplant lymphoproliferative disorder, papillary thyroid cancer in remission  s/p thryoidectomy presents for ongoing evaluation and management.    1.  NO change in medications  2. Get regular activity  3. Follow up with Dr. Torres and Emi Saleem NP in 1 year with a device check, echo and ekg prior.       Rah Torres M.D.   of Pediatrics  Pediatric and Adult Congenital Cardiology  [facility]  Pediatric Cardiology Office [phone]  Adult Congenital Cardiology Triage and Scheduling [phone]    A total of 30 minutes were spent in personal review of testing, including MRI, review of documented history and interval events in chart, and face to face visit with discussion of findings and  plan.

## 2023-06-23 NOTE — PROGRESS NOTES
It was a pleasure to see you in clinic today. Please do not hesitate to call with any questions or concerns.    MIGDALIA Paulino, RN  Electrophysiology Nurse Clinician  [facility]  During business hours call:  [phone]  Urgent needs after hours- please call: [phone]- select option #4 and ask for job code [phone].

## 2023-06-26 LAB
ATRIAL RATE - MUSE: 64 BPM
DIASTOLIC BLOOD PRESSURE - MUSE: NORMAL MMHG
INTERPRETATION ECG - MUSE: NORMAL
P AXIS - MUSE: 66 DEGREES
PR INTERVAL - MUSE: 142 MS
QRS DURATION - MUSE: 120 MS
QT - MUSE: 418 MS
QTC - MUSE: 431 MS
R AXIS - MUSE: 127 DEGREES
SYSTOLIC BLOOD PRESSURE - MUSE: NORMAL MMHG
T AXIS - MUSE: 21 DEGREES
VENTRICULAR RATE- MUSE: 64 BPM

## 2023-06-28 DIAGNOSIS — Z79.60 LONG-TERM USE OF IMMUNOSUPPRESSANT MEDICATION: ICD-10-CM

## 2023-06-28 DIAGNOSIS — Z94.0 KIDNEY TRANSPLANT RECIPIENT: Primary | ICD-10-CM

## 2023-06-28 RX ORDER — MYCOPHENOLATE MOFETIL 250 MG/1
500 CAPSULE ORAL 2 TIMES DAILY
Qty: 120 CAPSULE | Refills: 11 | Status: SHIPPED | OUTPATIENT
Start: 2023-06-28 | End: 2024-06-14

## 2023-07-03 DIAGNOSIS — E83.42 HYPOMAGNESEMIA: ICD-10-CM

## 2023-07-03 RX ORDER — MAGNESIUM OXIDE 400 MG/1
TABLET ORAL
Qty: 120 TABLET | Refills: 11 | Status: SHIPPED | OUTPATIENT
Start: 2023-07-03 | End: 2024-07-22

## 2023-07-06 ENCOUNTER — MYC REFILL (OUTPATIENT)
Dept: FAMILY MEDICINE | Facility: CLINIC | Age: 43
End: 2023-07-06
Payer: COMMERCIAL

## 2023-07-06 DIAGNOSIS — F41.0 PANIC DISORDER WITHOUT AGORAPHOBIA: ICD-10-CM

## 2023-07-07 RX ORDER — LORAZEPAM 1 MG/1
1 TABLET ORAL DAILY PRN
Qty: 10 TABLET | Refills: 0 | Status: SHIPPED | OUTPATIENT
Start: 2023-07-07 | End: 2023-10-09

## 2023-07-10 ENCOUNTER — MYC MEDICAL ADVICE (OUTPATIENT)
Dept: CARDIOLOGY | Facility: CLINIC | Age: 43
End: 2023-07-10
Payer: COMMERCIAL

## 2023-07-10 DIAGNOSIS — I48.92 ATRIAL FLUTTER, UNSPECIFIED TYPE (H): ICD-10-CM

## 2023-07-11 RX ORDER — METOPROLOL SUCCINATE 25 MG/1
TABLET, EXTENDED RELEASE ORAL
Qty: 270 TABLET | Refills: 3 | Status: SHIPPED | OUTPATIENT
Start: 2023-07-11 | End: 2024-06-15

## 2023-07-11 NOTE — TELEPHONE ENCOUNTER
Date: 7/11/2023    Time of Call: 4:37 PM     Diagnosis:  A flutter     [ TORB ] Ordering provider: Dr. Torres  Order: increase AM metoprolol to 50mg, keep 25mg at night     Order received by: Pamela CHRISTINE RN     Follow-up/additional notes: n/a

## 2023-08-03 ENCOUNTER — APPOINTMENT (OUTPATIENT)
Dept: LAB | Facility: CLINIC | Age: 43
End: 2023-08-03
Payer: COMMERCIAL

## 2023-08-03 DIAGNOSIS — Z94.4 LIVER REPLACED BY TRANSPLANT (H): Primary | ICD-10-CM

## 2023-08-03 DIAGNOSIS — Z13.220 LIPID SCREENING: ICD-10-CM

## 2023-08-10 ENCOUNTER — LAB (OUTPATIENT)
Dept: LAB | Facility: CLINIC | Age: 43
End: 2023-08-10
Payer: COMMERCIAL

## 2023-08-10 DIAGNOSIS — Z13.220 LIPID SCREENING: ICD-10-CM

## 2023-08-10 DIAGNOSIS — Z94.4 LIVER REPLACED BY TRANSPLANT (H): ICD-10-CM

## 2023-08-10 LAB
ALBUMIN MFR UR ELPH: <6 MG/DL
ALBUMIN SERPL BCG-MCNC: 4.8 G/DL (ref 3.5–5.2)
ALBUMIN UR-MCNC: NEGATIVE MG/DL
ALP SERPL-CCNC: 77 U/L (ref 40–129)
ALT SERPL W P-5'-P-CCNC: 24 U/L (ref 0–70)
ANION GAP SERPL CALCULATED.3IONS-SCNC: 11 MMOL/L (ref 7–15)
APPEARANCE UR: CLEAR
AST SERPL W P-5'-P-CCNC: 32 U/L (ref 0–45)
BILIRUB DIRECT SERPL-MCNC: <0.2 MG/DL (ref 0–0.3)
BILIRUB SERPL-MCNC: 0.6 MG/DL
BILIRUB UR QL STRIP: NEGATIVE
BUN SERPL-MCNC: 19.1 MG/DL (ref 6–20)
CALCIUM SERPL-MCNC: 9.6 MG/DL (ref 8.6–10)
CHLORIDE SERPL-SCNC: 103 MMOL/L (ref 98–107)
CHOLEST SERPL-MCNC: 197 MG/DL
COLOR UR AUTO: YELLOW
CREAT SERPL-MCNC: 1.46 MG/DL (ref 0.67–1.17)
CREAT UR-MCNC: 34 MG/DL
DEPRECATED HCO3 PLAS-SCNC: 25 MMOL/L (ref 22–29)
ERYTHROCYTE [DISTWIDTH] IN BLOOD BY AUTOMATED COUNT: 12.7 % (ref 10–15)
GFR SERPL CREATININE-BSD FRML MDRD: 61 ML/MIN/1.73M2
GLUCOSE SERPL-MCNC: 104 MG/DL (ref 70–99)
GLUCOSE UR STRIP-MCNC: NEGATIVE MG/DL
HCT VFR BLD AUTO: 47.5 % (ref 40–53)
HDLC SERPL-MCNC: 40 MG/DL
HGB BLD-MCNC: 15.8 G/DL (ref 13.3–17.7)
HGB UR QL STRIP: NEGATIVE
KETONES UR STRIP-MCNC: NEGATIVE MG/DL
LDLC SERPL CALC-MCNC: 132 MG/DL
LEUKOCYTE ESTERASE UR QL STRIP: NEGATIVE
MCH RBC QN AUTO: 29.8 PG (ref 26.5–33)
MCHC RBC AUTO-ENTMCNC: 33.3 G/DL (ref 31.5–36.5)
MCV RBC AUTO: 90 FL (ref 78–100)
NITRATE UR QL: NEGATIVE
NONHDLC SERPL-MCNC: 157 MG/DL
PH UR STRIP: 7 [PH] (ref 5–7)
PLATELET # BLD AUTO: 154 10E3/UL (ref 150–450)
POTASSIUM SERPL-SCNC: 4.4 MMOL/L (ref 3.4–5.3)
PROT SERPL-MCNC: 7.3 G/DL (ref 6.4–8.3)
PROT/CREAT 24H UR: NORMAL MG/G{CREAT}
RBC # BLD AUTO: 5.3 10E6/UL (ref 4.4–5.9)
SODIUM SERPL-SCNC: 139 MMOL/L (ref 136–145)
SP GR UR STRIP: <=1.005 (ref 1–1.03)
TACROLIMUS BLD-MCNC: 4.2 UG/L (ref 5–15)
TME LAST DOSE: ABNORMAL H
TME LAST DOSE: ABNORMAL H
TRIGL SERPL-MCNC: 125 MG/DL
UROBILINOGEN UR STRIP-ACNC: 0.2 E.U./DL
WBC # BLD AUTO: 5.6 10E3/UL (ref 4–11)

## 2023-08-10 PROCEDURE — 81003 URINALYSIS AUTO W/O SCOPE: CPT

## 2023-08-10 PROCEDURE — 85027 COMPLETE CBC AUTOMATED: CPT

## 2023-08-10 PROCEDURE — 82248 BILIRUBIN DIRECT: CPT

## 2023-08-10 PROCEDURE — 80053 COMPREHEN METABOLIC PANEL: CPT

## 2023-08-10 PROCEDURE — 36415 COLL VENOUS BLD VENIPUNCTURE: CPT

## 2023-08-10 PROCEDURE — 80197 ASSAY OF TACROLIMUS: CPT

## 2023-08-10 PROCEDURE — 80061 LIPID PANEL: CPT

## 2023-08-10 PROCEDURE — 84156 ASSAY OF PROTEIN URINE: CPT

## 2023-09-25 ENCOUNTER — ANCILLARY PROCEDURE (OUTPATIENT)
Dept: CARDIOLOGY | Facility: CLINIC | Age: 43
End: 2023-09-25
Attending: INTERNAL MEDICINE
Payer: COMMERCIAL

## 2023-09-25 DIAGNOSIS — Q20.3 D-TGA (DEXTRO-TRANSPOSITION OF GREAT ARTERIES): ICD-10-CM

## 2023-09-25 DIAGNOSIS — I48.92 ATRIAL FLUTTER, UNSPECIFIED TYPE (H): ICD-10-CM

## 2023-09-25 DIAGNOSIS — I42.8 NICM (NONISCHEMIC CARDIOMYOPATHY) (H): ICD-10-CM

## 2023-09-25 PROCEDURE — 93296 REM INTERROG EVL PM/IDS: CPT

## 2023-09-25 PROCEDURE — 93295 DEV INTERROG REMOTE 1/2/MLT: CPT | Performed by: INTERNAL MEDICINE

## 2023-09-28 LAB
MDC_IDC_EPISODE_DTM: NORMAL
MDC_IDC_EPISODE_DURATION: 2 S
MDC_IDC_EPISODE_ID: 2
MDC_IDC_EPISODE_TYPE: NORMAL
MDC_IDC_LEAD_IMPLANT_DT: NORMAL
MDC_IDC_LEAD_IMPLANT_DT: NORMAL
MDC_IDC_LEAD_LOCATION: NORMAL
MDC_IDC_LEAD_LOCATION: NORMAL
MDC_IDC_LEAD_LOCATION_DETAIL_1: NORMAL
MDC_IDC_LEAD_LOCATION_DETAIL_1: NORMAL
MDC_IDC_LEAD_MFG: NORMAL
MDC_IDC_LEAD_MFG: NORMAL
MDC_IDC_LEAD_MODEL: NORMAL
MDC_IDC_LEAD_MODEL: NORMAL
MDC_IDC_LEAD_POLARITY_TYPE: NORMAL
MDC_IDC_LEAD_POLARITY_TYPE: NORMAL
MDC_IDC_LEAD_SERIAL: NORMAL
MDC_IDC_LEAD_SERIAL: NORMAL
MDC_IDC_LEAD_SPECIAL_FUNCTION: NORMAL
MDC_IDC_LEAD_SPECIAL_FUNCTION: NORMAL
MDC_IDC_MSMT_BATTERY_DTM: NORMAL
MDC_IDC_MSMT_BATTERY_REMAINING_LONGEVITY: 150 MO
MDC_IDC_MSMT_BATTERY_RRT_TRIGGER: NORMAL
MDC_IDC_MSMT_BATTERY_STATUS: NORMAL
MDC_IDC_MSMT_BATTERY_VOLTAGE: 3.03 V
MDC_IDC_MSMT_CAP_CHARGE_DTM: NORMAL
MDC_IDC_MSMT_CAP_CHARGE_ENERGY: 18 J
MDC_IDC_MSMT_CAP_CHARGE_TIME: 3.8 S
MDC_IDC_MSMT_CAP_CHARGE_TYPE: NORMAL
MDC_IDC_MSMT_LEADCHNL_RA_IMPEDANCE_VALUE: 513 OHM
MDC_IDC_MSMT_LEADCHNL_RA_PACING_THRESHOLD_AMPLITUDE: 0.75 V
MDC_IDC_MSMT_LEADCHNL_RA_PACING_THRESHOLD_PULSEWIDTH: 0.4 MS
MDC_IDC_MSMT_LEADCHNL_RA_SENSING_INTR_AMPL: 5 MV
MDC_IDC_MSMT_LEADCHNL_RV_IMPEDANCE_VALUE: 323 OHM
MDC_IDC_MSMT_LEADCHNL_RV_IMPEDANCE_VALUE: 456 OHM
MDC_IDC_MSMT_LEADCHNL_RV_PACING_THRESHOLD_AMPLITUDE: 0.75 V
MDC_IDC_MSMT_LEADCHNL_RV_PACING_THRESHOLD_PULSEWIDTH: 0.4 MS
MDC_IDC_MSMT_LEADCHNL_RV_SENSING_INTR_AMPL: 9.1 MV
MDC_IDC_PG_IMPLANT_DTM: NORMAL
MDC_IDC_PG_MFG: NORMAL
MDC_IDC_PG_MODEL: NORMAL
MDC_IDC_PG_SERIAL: NORMAL
MDC_IDC_PG_TYPE: NORMAL
MDC_IDC_SESS_CLINIC_NAME: NORMAL
MDC_IDC_SESS_DTM: NORMAL
MDC_IDC_SESS_TYPE: NORMAL
MDC_IDC_SET_BRADY_AT_MODE_SWITCH_RATE: 171 {BEATS}/MIN
MDC_IDC_SET_BRADY_LOWRATE: 50 {BEATS}/MIN
MDC_IDC_SET_BRADY_MAX_SENSOR_RATE: 150 {BEATS}/MIN
MDC_IDC_SET_BRADY_MAX_TRACKING_RATE: 150 {BEATS}/MIN
MDC_IDC_SET_BRADY_MODE: NORMAL
MDC_IDC_SET_BRADY_PAV_DELAY_LOW: 180 MS
MDC_IDC_SET_BRADY_SAV_DELAY_LOW: 150 MS
MDC_IDC_SET_LEADCHNL_RA_PACING_AMPLITUDE: 1.5 V
MDC_IDC_SET_LEADCHNL_RA_PACING_ANODE_ELECTRODE_1: NORMAL
MDC_IDC_SET_LEADCHNL_RA_PACING_ANODE_LOCATION_1: NORMAL
MDC_IDC_SET_LEADCHNL_RA_PACING_CAPTURE_MODE: NORMAL
MDC_IDC_SET_LEADCHNL_RA_PACING_CATHODE_ELECTRODE_1: NORMAL
MDC_IDC_SET_LEADCHNL_RA_PACING_CATHODE_LOCATION_1: NORMAL
MDC_IDC_SET_LEADCHNL_RA_PACING_POLARITY: NORMAL
MDC_IDC_SET_LEADCHNL_RA_PACING_PULSEWIDTH: 0.4 MS
MDC_IDC_SET_LEADCHNL_RA_SENSING_ANODE_ELECTRODE_1: NORMAL
MDC_IDC_SET_LEADCHNL_RA_SENSING_ANODE_LOCATION_1: NORMAL
MDC_IDC_SET_LEADCHNL_RA_SENSING_CATHODE_ELECTRODE_1: NORMAL
MDC_IDC_SET_LEADCHNL_RA_SENSING_CATHODE_LOCATION_1: NORMAL
MDC_IDC_SET_LEADCHNL_RA_SENSING_POLARITY: NORMAL
MDC_IDC_SET_LEADCHNL_RA_SENSING_SENSITIVITY: 0.3 MV
MDC_IDC_SET_LEADCHNL_RV_PACING_AMPLITUDE: 2 V
MDC_IDC_SET_LEADCHNL_RV_PACING_ANODE_ELECTRODE_1: NORMAL
MDC_IDC_SET_LEADCHNL_RV_PACING_ANODE_LOCATION_1: NORMAL
MDC_IDC_SET_LEADCHNL_RV_PACING_CAPTURE_MODE: NORMAL
MDC_IDC_SET_LEADCHNL_RV_PACING_CATHODE_ELECTRODE_1: NORMAL
MDC_IDC_SET_LEADCHNL_RV_PACING_CATHODE_LOCATION_1: NORMAL
MDC_IDC_SET_LEADCHNL_RV_PACING_POLARITY: NORMAL
MDC_IDC_SET_LEADCHNL_RV_PACING_PULSEWIDTH: 0.4 MS
MDC_IDC_SET_LEADCHNL_RV_SENSING_ANODE_ELECTRODE_1: NORMAL
MDC_IDC_SET_LEADCHNL_RV_SENSING_ANODE_LOCATION_1: NORMAL
MDC_IDC_SET_LEADCHNL_RV_SENSING_CATHODE_ELECTRODE_1: NORMAL
MDC_IDC_SET_LEADCHNL_RV_SENSING_CATHODE_LOCATION_1: NORMAL
MDC_IDC_SET_LEADCHNL_RV_SENSING_POLARITY: NORMAL
MDC_IDC_SET_LEADCHNL_RV_SENSING_SENSITIVITY: 0.3 MV
MDC_IDC_SET_ZONE_DETECTION_BEATS_DENOMINATOR: 40 {BEATS}
MDC_IDC_SET_ZONE_DETECTION_BEATS_NUMERATOR: 30 {BEATS}
MDC_IDC_SET_ZONE_DETECTION_INTERVAL: 300 MS
MDC_IDC_SET_ZONE_DETECTION_INTERVAL: 350 MS
MDC_IDC_SET_ZONE_DETECTION_INTERVAL: 350 MS
MDC_IDC_SET_ZONE_DETECTION_INTERVAL: 360 MS
MDC_IDC_SET_ZONE_TYPE: NORMAL
MDC_IDC_STAT_AT_BURDEN_PERCENT: 0 %
MDC_IDC_STAT_AT_DTM_END: NORMAL
MDC_IDC_STAT_AT_DTM_START: NORMAL
MDC_IDC_STAT_BRADY_AP_VP_PERCENT: 0.01 %
MDC_IDC_STAT_BRADY_AP_VS_PERCENT: 2.76 %
MDC_IDC_STAT_BRADY_AS_VP_PERCENT: 0.03 %
MDC_IDC_STAT_BRADY_AS_VS_PERCENT: 97.21 %
MDC_IDC_STAT_BRADY_DTM_END: NORMAL
MDC_IDC_STAT_BRADY_DTM_START: NORMAL
MDC_IDC_STAT_BRADY_RA_PERCENT_PACED: 3.11 %
MDC_IDC_STAT_BRADY_RV_PERCENT_PACED: 0.04 %
MDC_IDC_STAT_CRT_DTM_END: NORMAL
MDC_IDC_STAT_CRT_DTM_START: NORMAL
MDC_IDC_STAT_EPISODE_RECENT_COUNT: 0
MDC_IDC_STAT_EPISODE_RECENT_COUNT: 1
MDC_IDC_STAT_EPISODE_RECENT_COUNT_DTM_END: NORMAL
MDC_IDC_STAT_EPISODE_RECENT_COUNT_DTM_START: NORMAL
MDC_IDC_STAT_EPISODE_TOTAL_COUNT: 0
MDC_IDC_STAT_EPISODE_TOTAL_COUNT: 2
MDC_IDC_STAT_EPISODE_TOTAL_COUNT_DTM_END: NORMAL
MDC_IDC_STAT_EPISODE_TOTAL_COUNT_DTM_START: NORMAL
MDC_IDC_STAT_EPISODE_TYPE: NORMAL
MDC_IDC_STAT_TACHYTHERAPY_ATP_DELIVERED_RECENT: 0
MDC_IDC_STAT_TACHYTHERAPY_ATP_DELIVERED_TOTAL: 0
MDC_IDC_STAT_TACHYTHERAPY_RECENT_DTM_END: NORMAL
MDC_IDC_STAT_TACHYTHERAPY_RECENT_DTM_START: NORMAL
MDC_IDC_STAT_TACHYTHERAPY_SHOCKS_ABORTED_RECENT: 0
MDC_IDC_STAT_TACHYTHERAPY_SHOCKS_ABORTED_TOTAL: 0
MDC_IDC_STAT_TACHYTHERAPY_SHOCKS_DELIVERED_RECENT: 0
MDC_IDC_STAT_TACHYTHERAPY_SHOCKS_DELIVERED_TOTAL: 0
MDC_IDC_STAT_TACHYTHERAPY_TOTAL_DTM_END: NORMAL
MDC_IDC_STAT_TACHYTHERAPY_TOTAL_DTM_START: NORMAL

## 2023-10-05 ENCOUNTER — LAB (OUTPATIENT)
Dept: LAB | Facility: CLINIC | Age: 43
End: 2023-10-05
Payer: COMMERCIAL

## 2023-10-05 DIAGNOSIS — Z94.4 LIVER REPLACED BY TRANSPLANT (H): ICD-10-CM

## 2023-10-05 LAB
ALBUMIN SERPL BCG-MCNC: 4.5 G/DL (ref 3.5–5.2)
ALP SERPL-CCNC: 68 U/L (ref 40–129)
ALT SERPL W P-5'-P-CCNC: 22 U/L (ref 0–70)
ANION GAP SERPL CALCULATED.3IONS-SCNC: 9 MMOL/L (ref 7–15)
AST SERPL W P-5'-P-CCNC: 28 U/L (ref 0–45)
BILIRUB DIRECT SERPL-MCNC: <0.2 MG/DL (ref 0–0.3)
BILIRUB SERPL-MCNC: 0.5 MG/DL
BUN SERPL-MCNC: 19 MG/DL (ref 6–20)
CALCIUM SERPL-MCNC: 9.8 MG/DL (ref 8.6–10)
CHLORIDE SERPL-SCNC: 103 MMOL/L (ref 98–107)
CREAT SERPL-MCNC: 1.43 MG/DL (ref 0.67–1.17)
DEPRECATED HCO3 PLAS-SCNC: 25 MMOL/L (ref 22–29)
EGFRCR SERPLBLD CKD-EPI 2021: 62 ML/MIN/1.73M2
ERYTHROCYTE [DISTWIDTH] IN BLOOD BY AUTOMATED COUNT: 12.2 % (ref 10–15)
GLUCOSE SERPL-MCNC: 102 MG/DL (ref 70–99)
HCT VFR BLD AUTO: 47.5 % (ref 40–53)
HGB BLD-MCNC: 15.8 G/DL (ref 13.3–17.7)
MCH RBC QN AUTO: 29.6 PG (ref 26.5–33)
MCHC RBC AUTO-ENTMCNC: 33.3 G/DL (ref 31.5–36.5)
MCV RBC AUTO: 89 FL (ref 78–100)
PLATELET # BLD AUTO: 127 10E3/UL (ref 150–450)
POTASSIUM SERPL-SCNC: 4.7 MMOL/L (ref 3.4–5.3)
PROT SERPL-MCNC: 6.9 G/DL (ref 6.4–8.3)
RBC # BLD AUTO: 5.33 10E6/UL (ref 4.4–5.9)
SODIUM SERPL-SCNC: 137 MMOL/L (ref 135–145)
TACROLIMUS BLD-MCNC: 5 UG/L (ref 5–15)
TME LAST DOSE: NORMAL H
TME LAST DOSE: NORMAL H
WBC # BLD AUTO: 5.1 10E3/UL (ref 4–11)

## 2023-10-05 PROCEDURE — 82248 BILIRUBIN DIRECT: CPT

## 2023-10-05 PROCEDURE — 80197 ASSAY OF TACROLIMUS: CPT

## 2023-10-05 PROCEDURE — 36415 COLL VENOUS BLD VENIPUNCTURE: CPT

## 2023-10-05 PROCEDURE — 80053 COMPREHEN METABOLIC PANEL: CPT

## 2023-10-05 PROCEDURE — 85027 COMPLETE CBC AUTOMATED: CPT

## 2023-10-09 ENCOUNTER — MYC REFILL (OUTPATIENT)
Dept: FAMILY MEDICINE | Facility: CLINIC | Age: 43
End: 2023-10-09
Payer: COMMERCIAL

## 2023-10-09 DIAGNOSIS — G47.00 INSOMNIA, UNSPECIFIED TYPE: ICD-10-CM

## 2023-10-09 DIAGNOSIS — F41.0 PANIC DISORDER WITHOUT AGORAPHOBIA: ICD-10-CM

## 2023-10-09 RX ORDER — LORAZEPAM 1 MG/1
1 TABLET ORAL DAILY PRN
Qty: 10 TABLET | Refills: 0 | Status: SHIPPED | OUTPATIENT
Start: 2023-10-09 | End: 2023-12-15

## 2023-10-09 RX ORDER — TRAZODONE HYDROCHLORIDE 50 MG/1
50 TABLET, FILM COATED ORAL
Qty: 180 TABLET | Refills: 0 | Status: SHIPPED | OUTPATIENT
Start: 2023-10-09 | End: 2024-04-13

## 2023-10-10 ENCOUNTER — OFFICE VISIT (OUTPATIENT)
Dept: GASTROENTEROLOGY | Facility: CLINIC | Age: 43
End: 2023-10-10
Attending: INTERNAL MEDICINE
Payer: COMMERCIAL

## 2023-10-10 VITALS
BODY MASS INDEX: 29.57 KG/M2 | RESPIRATION RATE: 18 BRPM | SYSTOLIC BLOOD PRESSURE: 134 MMHG | TEMPERATURE: 98.3 F | HEIGHT: 71 IN | OXYGEN SATURATION: 98 % | DIASTOLIC BLOOD PRESSURE: 91 MMHG | HEART RATE: 63 BPM | WEIGHT: 211.2 LBS

## 2023-10-10 DIAGNOSIS — Z94.4 LIVER REPLACED BY TRANSPLANT (H): ICD-10-CM

## 2023-10-10 DIAGNOSIS — Z23 NEED FOR INFLUENZA VACCINATION: Primary | ICD-10-CM

## 2023-10-10 PROCEDURE — G0008 ADMIN INFLUENZA VIRUS VAC: HCPCS | Performed by: INTERNAL MEDICINE

## 2023-10-10 PROCEDURE — 90686 IIV4 VACC NO PRSV 0.5 ML IM: CPT | Performed by: INTERNAL MEDICINE

## 2023-10-10 PROCEDURE — 99214 OFFICE O/P EST MOD 30 MIN: CPT | Performed by: INTERNAL MEDICINE

## 2023-10-10 PROCEDURE — G0463 HOSPITAL OUTPT CLINIC VISIT: HCPCS | Mod: 25 | Performed by: INTERNAL MEDICINE

## 2023-10-10 PROCEDURE — 250N000011 HC RX IP 250 OP 636: Performed by: INTERNAL MEDICINE

## 2023-10-10 RX ADMIN — INFLUENZA A VIRUS A/VICTORIA/4897/2022 IVR-238 (H1N1) ANTIGEN (FORMALDEHYDE INACTIVATED), INFLUENZA A VIRUS A/DARWIN/9/2021 SAN-010 (H3N2) ANTIGEN (FORMALDEHYDE INACTIVATED), INFLUENZA B VIRUS B/PHUKET/3073/2013 ANTIGEN (FORMALDEHYDE INACTIVATED), AND INFLUENZA B VIRUS B/MICHIGAN/01/2021 ANTIGEN (FORMALDEHYDE INACTIVATED) 0.5 ML: 15; 15; 15; 15 INJECTION, SUSPENSION INTRAMUSCULAR at 11:03

## 2023-10-10 ASSESSMENT — PAIN SCALES - GENERAL: PAINLEVEL: NO PAIN (0)

## 2023-10-10 NOTE — PROGRESS NOTES
[facility]     A/P  Mr. Chen is a 43 Y M s/p LKT 5/2016 for ETOH.    IS per Dr. Sidhu. No changes to medications today. Continue monitoring labs and IS level every 3 months to assess for appropriate dosing and side effects from IS including BOUBACAR, pancytopenia, hyperkalemia, hypomagnesemia.    Graft function Liver tests normal, he is doing very well.    PTLD and thyroid ca Post transplant course c/b PTLD and thyroid ca. Oncology monitoring. He would be following as needed now.  Covid rec new Covid vaccine  Proph Recommend annual dermatology exam. He has this scheduled.  Had flu shot today      Will see back in 1 year in person or via video      ===================================================================  PCP: Dr. David Moser     SUBJECTIVE  Mr. Chen is a 43 Y M s/p DDLKT 5/2016 ETOH.  He is doing well.   He had an ICD put in.     Up to date with his visits with various doctors.       EXPLANT: No HCC  IS: Prograf 3-5 and MMF (per Dr. Sidhu)  LABS: Up to date, excellent liver function.  Liver Function Studies -   Recent Labs   Lab Test 10/05/23  1133   PROTTOTAL 6.9   ALBUMIN 4.5   BILITOTAL 0.5   ALKPHOS 68   AST 28   ALT 22     Lab Test 10/05/23  1133   WBC 5.1   RBC 5.33   HGB 15.8   HCT 47.5   MCV 89   MCH 29.6   MCHC 33.3   RDW 12.2   *     REJECTION: None.  BILIARY ISSUES: None  STENT: Out  KIDNEY FUNCTION:   Creatinine   Date Value Ref Range Status   10/05/2023 1.43 (H) 0.67 - 1.17 mg/dL Final   07/06/2021 1.27 (H) 0.66 - 1.25 mg/dL Final     BP: Good. Lisinopril, amlodipine  PREV:  Derm 10/2018. Flu shot done today. Will see derm in 2 weeks  DISEASE RECURRENCE: Sober since prior to transplant  OTHER ISSUES:   PTLD in past. Recent imaging neg. Follows with oncology  Thyroid cancer, s/p thyroidectomy last surveillance July negative  Weight gain.   Had fistula out.  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     Exam  BP  "(!) 134/91 (BP Location: Left arm, Patient Position: Sitting, Cuff Size: Adult Regular)   Pulse 63   Temp 98.3  F (36.8  C) (Oral)   Resp 18   Ht 1.797 m (5' 10.75\")   Wt 95.8 kg (211 lb 3.2 oz)   SpO2 98%   BMI 29.67 kg/m      Gen Alert pleasant NAD  Resp No difficulty breathing. No cough  Skin No Jaundice  Eyes No icterus  Neuro FIGUEREDO  MSK no muscle wasting  Psyche Pleasant, appropriate. Well groomed.              "

## 2023-10-10 NOTE — LETTER
10/10/2023         RE: Cricket Chen  [address]  [facility] [address]        Dear Colleague,    Thank you for referring your patient, Cricket Chen, to the [facility] HEPATOLOGY [facility]IN[facility] [place]. Please see a copy of my visit note below.    [facility]  LIVER TRANSPLANT CLINIC     A/P  Mr. Chen is a 43 Y M s/p LKT 5/2016 for ETOH.    IS per Dr. Sidhu. No changes to medications today. Continue monitoring labs and IS level every 3 months to assess for appropriate dosing and side effects from IS including BOUBACAR, pancytopenia, hyperkalemia, hypomagnesemia.    Graft function Liver tests normal, he is doing very well.    PTLD and thyroid ca Post transplant course c/b PTLD and thyroid ca. Oncology monitoring. He would be following as needed now.  Covid rec new Covid vaccine  Proph Recommend annual dermatology exam. He has this scheduled.  Had flu shot today      Will see back in 1 year in person or via video      ===================================================================  PCP: Dr. David Moser     SUBJECTIVE  Mr. Chen is a 43 Y M s/p DDLKT 5/2016 ETOH.  He is doing well.   He had an ICD put in.     Up to date with his visits with various doctors.       EXPLANT: No HCC  IS: Prograf 3-5 and MMF (per Dr. Sidhu)  LABS: Up to date, excellent liver function.  Liver Function Studies -   Recent Labs   Lab Test 10/05/23  1133   PROTTOTAL 6.9   ALBUMIN 4.5   BILITOTAL 0.5   ALKPHOS 68   AST 28   ALT 22     Lab Test 10/05/23  1133   WBC 5.1   RBC 5.33   HGB 15.8   HCT 47.5   MCV 89   MCH 29.6   MCHC 33.3   RDW 12.2   *     REJECTION: None.  BILIARY ISSUES: None  STENT: Out  KIDNEY FUNCTION:   Creatinine   Date Value Ref Range Status   10/05/2023 1.43 (H) 0.67 - 1.17 mg/dL Final   07/06/2021 1.27 (H) 0.66 - 1.25 mg/dL Final     BP: Good. Lisinopril, amlodipine  PREV:  Derm 10/2018. Flu shot done today. Will see derm in 2 weeks  DISEASE RECURRENCE: Sober since prior  "to transplant  OTHER ISSUES:   PTLD in past. Recent imaging neg. Follows with oncology  Thyroid cancer, s/p thyroidectomy last surveillance July negative  Weight gain.   Had fistula out.  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     Exam  BP (!) 134/91 (BP Location: Left arm, Patient Position: Sitting, Cuff Size: Adult Regular)   Pulse 63   Temp 98.3  F (36.8  C) (Oral)   Resp 18   Ht 1.797 m (5' 10.75\")   Wt 95.8 kg (211 lb 3.2 oz)   SpO2 98%   BMI 29.67 kg/m      Gen Alert pleasant NAD  Resp No difficulty breathing. No cough  Skin No Jaundice  Eyes No icterus  Neuro FIGUEREDO  MSK no muscle wasting  Psyche Pleasant, appropriate. Well groomed.            Again, thank you for allowing me to participate in the care of your patient.        Sincerely,        Laureen Galvan MD  "

## 2023-10-25 ENCOUNTER — OFFICE VISIT (OUTPATIENT)
Dept: DERMATOLOGY | Facility: CLINIC | Age: 43
End: 2023-10-25
Payer: COMMERCIAL

## 2023-10-25 ENCOUNTER — TELEPHONE (OUTPATIENT)
Dept: DERMATOLOGY | Facility: CLINIC | Age: 43
End: 2023-10-25

## 2023-10-25 DIAGNOSIS — L21.9 SEBORRHEIC ECZEMA: Primary | ICD-10-CM

## 2023-10-25 DIAGNOSIS — L81.4 LENTIGO: ICD-10-CM

## 2023-10-25 DIAGNOSIS — Z94.4 HISTORY OF LIVER TRANSPLANT (H): ICD-10-CM

## 2023-10-25 DIAGNOSIS — D18.01 CHERRY ANGIOMA: ICD-10-CM

## 2023-10-25 DIAGNOSIS — D22.9 MULTIPLE BENIGN NEVI: ICD-10-CM

## 2023-10-25 DIAGNOSIS — L82.1 SEBORRHEIC KERATOSIS: ICD-10-CM

## 2023-10-25 DIAGNOSIS — Z94.0 HISTORY OF KIDNEY TRANSPLANT: ICD-10-CM

## 2023-10-25 PROCEDURE — 99203 OFFICE O/P NEW LOW 30 MIN: CPT | Performed by: PHYSICIAN ASSISTANT

## 2023-10-25 RX ORDER — TACROLIMUS 1 MG/G
OINTMENT TOPICAL
Qty: 30 G | Refills: 5 | Status: SHIPPED | OUTPATIENT
Start: 2023-10-25

## 2023-10-25 RX ORDER — FLUOCINONIDE TOPICAL SOLUTION USP, 0.05% 0.5 MG/ML
SOLUTION TOPICAL
Qty: 60 ML | Refills: 5 | Status: SHIPPED | OUTPATIENT
Start: 2023-10-25

## 2023-10-25 RX ORDER — KETOCONAZOLE 20 MG/G
CREAM TOPICAL
Qty: 30 G | Refills: 7 | Status: SHIPPED | OUTPATIENT
Start: 2023-10-25

## 2023-10-25 RX ORDER — KETOCONAZOLE 20 MG/ML
SHAMPOO TOPICAL
Qty: 120 ML | Refills: 6 | Status: SHIPPED | OUTPATIENT
Start: 2023-10-25

## 2023-10-25 NOTE — LETTER
10/25/2023         RE: Cricket Chen  [address] [facility] [id number]        Dear Colleague,    Thank you for referring your patient, Cricket Chen, to the [facility]. Please see a copy of my visit note below.    Cricket Chen is an extremely pleasant 43 year old year old male patient here today for skin check. He notes no new or changing nevi. No painful or bleeding skin lesions. Will get a rash that comes and goes on face and scalp. Patient has no other skin complaints today.  Remainder of the HPI, Meds, PMH, Allergies, FH, and SH was reviewed in chart.    Pertinent Hx:  No personal history of skin cancer. History of kidney and liver transplant 2016  Past Medical History:   Diagnosis Date     Alcohol abuse     Last drink in Mid-April 2014     Anemia in ESRD (end-stage renal disease) (H)      Anxiety 2008     Atrial flutter (H) 2017     Cirrhosis (H)     S/P liver transplant     Depression      History of blood transfusion      History of transposition of great vessels     atrial switch at age 8 months old     Hypertension      Liver transplant recipient (H)     2016     Papillary thyroid carcinoma (H)      Pneumonia 11-15-14     Renal transplant recipient     2016     Varices, esophageal (H)        Past Surgical History:   Procedure Laterality Date     ANESTHESIA CARDIOVERSION N/A 3/7/2018    Procedure: ANESTHESIA CARDIOVERSION;  Cardioversion;  Surgeon: GENERIC ANESTHESIA PROVIDER;  Location:  OR     BENCH LIVER N/A 5/10/2016    Procedure: BENCH LIVER;  Surgeon: Ricky Deshpande MD;  Location: [facility] OR     BIOPSY LYMPH NODE CERVICAL Right 1/26/2017    Procedure: BIOPSY LYMPH NODE CERVICAL;  Surgeon: Beka Soto MD;  Location:  OR     CARDIAC SURGERY  9-10-80     CREATE FISTULA ARTERIOVENOUS UPPER EXTREMITY Right 9/16/2014    Procedure: CREATE FISTULA ARTERIOVENOUS UPPER EXTREMITY;  Surgeon: Padmaja Eaton MD;  Location: [facility] OR     CV RIGHT HEART CATH  MEASUREMENTS RECORDED N/A 4/19/2019    Procedure: CV RIGHT HEART CATH;  Surgeon: Sabi Wiggins MD;  Location:  HEART CARDIAC CATH LAB     CYSTOSCOPY, REMOVE STENT(S), COMBINED Right 6/22/2016    Procedure: COMBINED CYSTOSCOPY, REMOVE STENT(S);  Surgeon: Ricky Deshpande MD;  Location: [facility] OR     EMBOLECTOMY UPPER EXTREMITY Right 8/17/2018    Procedure: EMBOLECTOMY UPPER EXTREMITY;  Repair Right Upper Arm Pseudo Aneursym ;  Surgeon: John Payton MD;  Location:  OR     ENT SURGERY       EP INSERT ICD Left 2/28/2023    Procedure: Insert Implantable Cardioverter-Defibrillator (ICD);  Surgeon: Jaylen George MD;  Location:  HEART CARDIAC CATH LAB     ESOPHAGOSCOPY, GASTROSCOPY, DUODENOSCOPY (EGD), COMBINED  5/30/2014    Procedure: COMBINED ESOPHAGOSCOPY, GASTROSCOPY, DUODENOSCOPY (EGD);  Surgeon: Guillaume Bautista MD;  Location:  GI     ESOPHAGOSCOPY, GASTROSCOPY, DUODENOSCOPY (EGD), COMBINED  9/30/14     ESOPHAGOSCOPY, GASTROSCOPY, DUODENOSCOPY (EGD), COMBINED Left 3/12/2015    Procedure: COMBINED ESOPHAGOSCOPY, GASTROSCOPY, DUODENOSCOPY (EGD);  Surgeon: Laureen Galvan MD;  Location:  GI     ESOPHAGOSCOPY, GASTROSCOPY, DUODENOSCOPY (EGD), COMBINED N/A 4/21/2016    Procedure: COMBINED ESOPHAGOSCOPY, GASTROSCOPY, DUODENOSCOPY (EGD);  Surgeon: Laureen Galvan MD;  Location:  GI     ESOPHAGOSCOPY, GASTROSCOPY, DUODENOSCOPY (EGD), COMBINED N/A 7/21/2016    Procedure: COMBINED ESOPHAGOSCOPY, GASTROSCOPY, DUODENOSCOPY (EGD);  Surgeon: Laureen Galvan MD;  Location:  GI     HC OR CATH ABLATION NON-CARDIAC ENDOVASCULAR  1990,2002    SVT     INSERT PORT VASCULAR ACCESS N/A 4/3/2017    Procedure: INSERT PORT VASCULAR ACCESS;  Surgeon: Rajendra Jacques PA-C;  [facility]: [facility] OR     IR PORT REMOVAL LEFT  1/22/2019     LIGATE FISTULA ARTERIOVENOUS UPPER EXTREMITY Right 11/7/2017    Procedure: LIGATE FISTULA ARTERIOVENOUS UPPER EXTREMITY;  Revision of  Right Arm Arteriovenous Fistula ;  Surgeon: Padmaja Eaton MD;  Location: [facility] OR     PERCUTANEOUS BIOPSY KIDNEY Right 2018    Procedure: PERCUTANEOUS BIOPSY KIDNEY;  Right Kidney Biopsy;  Surgeon: Yuval Khan MD;  [facility]     PERCUTANEOUS BIOPSY KIDNEY Right 10/30/2019    Procedure: Right Side Kidney Biopsy;  Surgeon: Jake Sidhu MD;  Location: [facility]     PICC INSERTION  14    PICC line placement 14; Removal 2014     REMOVE PORT VASCULAR ACCESS Right 2019    Procedure: Right chest Port Removal;  Surgeon: Erasmo Ziegler PA-C;  Location: [facility]     THORACIC SURGERY  1980    Transposition great arteries, repaired at 8 months     THYROIDECTOMY Right 2017    Procedure: THYROIDECTOMY;  Bilateral Total Thyroidectomy ;  Surgeon: Beka Soto MD;  Location: [facility] OR     TRANSPLANT KIDNEY RECIPIENT  DONOR  5/10/2016    Procedure: TRANSPLANT KIDNEY RECIPIENT  DONOR;  Surgeon: Ricky Deshpande MD;  Location: [facility]     TRANSPLANT LIVER RECIPIENT  DONOR N/A 5/10/2016    Procedure: TRANSPLANT LIVER RECIPIENT  DONOR;  Surgeon: Ricky Deshpande MD;  Location: [facility] OR        Family History   Problem Relation Age of Onset     No Known Problems Brother      Arthritis Father      Hypertension Father      Hypertension Mother      Anxiety Disorder Mother      Hyperlipidemia Mother      Hypertension Sister      Anxiety Disorder Sister      No Known Problems Maternal Grandmother      No Known Problems Maternal Grandfather      No Known Problems Paternal Grandmother      No Known Problems Brother      No Known Problems Paternal Grandfather      Alcohol/Drug No family hx of      Gastrointestinal Disease No family hx of         no fam hx of liver disease or liver cancer       Social History     Socioeconomic History     Marital status: Single     Spouse name: Not on file     Number of children: 0     Years of education: Not on file     Highest  education level: High school graduate   Occupational History     Employer: UNEMPLOYED   Tobacco Use     Smoking status: Former     Packs/day: 1.00     Years: 3.00     Additional pack years: 0.00     Total pack years: 3.00     Types: Cigarettes     Start date: 1997     Quit date: 2000     Years since quittin.1     Smokeless tobacco: Former     Quit date: 9/10/2001     Tobacco comments:     Quit chewing in early    Vaping Use     Vaping Use: Never used   Substance and Sexual Activity     Alcohol use: No     Alcohol/week: 0.0 standard drinks of alcohol     Comment: ~10 drinks per day for ten years, quit in 2014     Drug use: No     Types: Marijuana     Comment: in HS     Sexual activity: Not Currently     Partners: Female     Birth control/protection: None   Other Topics Concern     Parent/sibling w/ CABG, MI or angioplasty before 65F 55M? No   Social History Narrative    ? Blood transfusion as baby during surgery,    Professional performed tattoos     No Illicit IV or intranasal drug use.      Social Determinants of Health     Financial Resource Strain: Low Risk  (10/15/2020)    Overall Financial Resource Strain (CARDIA)      Difficulty of Paying Living Expenses: Not hard at all   Food Insecurity: No Food Insecurity (10/15/2020)    Hunger Vital Sign      Worried About Running Out of Food in the Last Year: Never true      Ran Out of Food in the Last Year: Never true   Transportation Needs: No Transportation Needs (10/15/2020)    PRAPARE - Transportation      Lack of Transportation (Medical): No      Lack of Transportation (Non-Medical): No   Physical Activity: Not on file   Stress: No Stress Concern Present (10/15/2020)    [employer] [place] [employer] - Occupational Stress Questionnaire      Feeling of Stress : Only a little   Social Connections: Unknown (10/15/2020)    Social Connection and Isolation Panel [NHANES]      Frequency of Communication with Friends and Family: Not  asked      Frequency of Social Gatherings with Friends and Family: Not asked      Attends Yarsanism Services: Not asked      Active Member of Clubs or Organizations: Not asked      Attends Club or Organization Meetings: Not asked      Marital Status: Never    Interpersonal Safety: Not At Risk (10/15/2020)    Humiliation, Afraid, Rape, and Kick questionnaire      Fear of Current or Ex-Partner: No      Emotionally Abused: No      Physically Abused: No      Sexually Abused: No   Housing Stability: Not on file       Outpatient Encounter Medications as of 10/25/2023   Medication Sig Dispense Refill     fluocinonide (LIDEX) 0.05 % external solution Apply sparingly twice daily to scalp  as needed. 60 mL 5     ketoconazole (NIZORAL) 2 % external cream Apply daily to affected area on face. 30 g 7     ketoconazole (NIZORAL) 2 % external shampoo Leave on scalp for 5 minutes then rinse. Use 2-3 time weekly. 120 mL 6     tacrolimus (PROTOPIC) 0.1 % external ointment Apply sparingly daily to face then reduce in 2-3 time weekly. 30 g 5     apixaban ANTICOAGULANT (ELIQUIS ANTICOAGULANT) 5 MG tablet Take 1 tablet (5 mg) by mouth 2 times daily 180 tablet 2     levothyroxine (SYNTHROID/LEVOTHROID) 137 MCG tablet Take 1 tablet (137 mcg) by mouth daily 90 tablet 3     lisinopril (ZESTRIL) 20 MG tablet TAKE ONE TABLET BY MOUTH ONCE DAILY 90 tablet 3     LORazepam (ATIVAN) 1 MG tablet Take 1 tablet (1 mg) by mouth daily as needed (severe anxiety/panic/sleep) ++NEEDS VISIT BEFORE NEXT FILL++ 10 tablet 0     magnesium oxide (MAG-OX) 400 MG tablet TAKE ONE TABLET BY MOUTH TWICE A  tablet 11     metoprolol succinate ER (TOPROL XL) 25 MG 24 hr tablet Take 2 tablets (50 mg) by mouth every morning AND 1 tablet (25 mg) every evening. 270 tablet 3     mycophenolate (GENERIC EQUIVALENT) 250 MG capsule Take 2 capsules (500 mg) by mouth 2 times daily 120 capsule 11     sulfamethoxazole-trimethoprim (BACTRIM) 400-80 MG tablet TAKE ONE  TABLET BY MOUTH ONCE DAILY 30 tablet 11     tacrolimus (GENERIC EQUIVALENT) 0.5 MG capsule TAKE ONE CAPSULE BY MOUTH EVERY MORNING (TOTAL DOSE IS 1.5MG IN  THE MORNING AND 1MG IN THE EVENING) 90 capsule 3     tacrolimus (GENERIC EQUIVALENT) 1 MG capsule TAKE ONE CAPSULE BY MOUTH TWICE A DAY (TOTAL DOSE IS 1.5MG IN THE MORNING AND 1MG IN THE EVENING) 180 capsule 3     traZODone (DESYREL) 50 MG tablet Take 1 tablet (50 mg) by mouth at bedtime as needed, may repeat once for sleep 180 tablet 0     No facility-administered encounter medications on file as of 10/25/2023.             O:   NAD, WDWN, Alert & Oriented, Mood & Affect wnl, Vitals stable   Here today alone   There were no vitals taken for this visit.   General appearance normal   Vitals stable   Alert, oriented and in no acute distress      Pink scaly thin plaques on face, scalp, bilateral cheeks, hairline, eyebrows   Stuck on papules and brown macules on trunk and ext   Red papules on trunk  Brown papules and macules with regular pigment network and borders on torso and extremities      The remainder of skin exam is normal      Eyes: Conjunctivae/lids:Normal     ENT: Lips normal    MSK:Normal    Cardiovascular: peripheral edema none    Pulm: Breathing Normal    Neuro/Psych: Orientation:Alert and Orientedx3 ; Mood/Affect:normal   A/P:  Seborrheic eczema  Discussed chronic problem.   Use ketoconazole  shampoo, leave on for 5 minutes then rinse, use 2-3 times weekly.  Apply ketoconazole cream to face in am.  Apply protopic sparingly to face in the evening.   Apply lidex solution as needed.    Seborrheic keratosis, lentigo, angioma, benign nevi, history of transplant    It was a pleasure speaking to Cricket Chen today.  BENIGN LESIONS DISCUSSED WITH PATIENT:  I discussed the specifics of tumor, prognosis, and genetics of benign lesions.  I explained that treatment of these lesions would be purely cosmetic and not medically neccessary.  I discussed with  patient different removal options including excision, cautery and /or laser.      Nature and genetics of benign skin lesions dicussed with patient.  Signs and Symptoms of skin cancer discussed with patient.  ABCDEs of melanoma reviewed with patient.  Patient encouraged to perform monthly skin exams.  UV precautions reviewed with patient.  Risks of non-melanoma skin cancer discussed with patient   Return to clinic as needed.       Again, thank you for allowing me to participate in the care of your patient.        Sincerely,        Saranya Calderon PA-C

## 2023-10-25 NOTE — TELEPHONE ENCOUNTER
Central Prior Authorization Team   Phone: [phone]    PRIOR AUTHORIZATION DENIED    Medication: TACROLIMUS 0.1 % EX OINT  Insurance Company: [employer] - Phone [phone] Fax [phone]  Denial Date: 10/25/2023  Denial Rational: COVERAGE REQUIRES A PART D APPROVED DX      Appeal Information: IF PROVIDER WOULD LIKE TO APPEAL THIS DECISION PLEASE PROVIDE THE PA TEAM WITH A LETTER OF MEDICAL NECESSITY        Patient Notified: No

## 2023-10-25 NOTE — PROGRESS NOTES
Cricket Chen is an extremely pleasant 43 year old year old male patient here today for skin check. He notes no new or changing nevi. No painful or bleeding skin lesions. Will get a rash that comes and goes on face and scalp. Patient has no other skin complaints today.  Remainder of the HPI, Meds, PMH, Allergies, FH, and SH was reviewed in chart.    Pertinent Hx:  No personal history of skin cancer. History of kidney and liver transplant 2016  Past Medical History:   Diagnosis Date    Alcohol abuse     Last drink in Mid-April 2014    Anemia in ESRD (end-stage renal disease) (H)     Anxiety 2008    Atrial flutter (H) 2017    Cirrhosis (H)     S/P liver transplant    Depression     History of blood transfusion     History of transposition of great vessels     atrial switch at age 8 months old    Hypertension     Liver transplant recipient (H)     2016    Papillary thyroid carcinoma (H)     Pneumonia 11-15-14    Renal transplant recipient     2016    Varices, esophageal (H)        Past Surgical History:   Procedure Laterality Date    ANESTHESIA CARDIOVERSION N/A 3/7/2018    Procedure: ANESTHESIA CARDIOVERSION;  Cardioversion;  Surgeon: GENERIC ANESTHESIA PROVIDER;  Location:  OR    BENCH LIVER N/A 5/10/2016    Procedure: BENCH LIVER;  Surgeon: Ricky Deshpande MD;  Location:  OR    BIOPSY LYMPH NODE CERVICAL Right 1/26/2017    Procedure: BIOPSY LYMPH NODE CERVICAL;  Surgeon: Beka Soto MD;  Location:  OR    CARDIAC SURGERY  9-10-80    CREATE FISTULA ARTERIOVENOUS UPPER EXTREMITY Right 9/16/2014    Procedure: CREATE FISTULA ARTERIOVENOUS UPPER EXTREMITY;  Surgeon: Padmaja Eaton MD;  Location:  OR    CV RIGHT HEART CATH MEASUREMENTS RECORDED N/A 4/19/2019    Procedure: CV RIGHT HEART CATH;  Surgeon: Sabi Wiggins MD;  Location:  HEART CARDIAC CATH LAB    CYSTOSCOPY, REMOVE STENT(S), COMBINED Right 6/22/2016    Procedure: COMBINED CYSTOSCOPY, REMOVE STENT(S);  Surgeon: Ricky Deshpande  MD;  Location: [facility] OR    EMBOLECTOMY UPPER EXTREMITY Right 8/17/2018    Procedure: EMBOLECTOMY UPPER EXTREMITY;  Repair Right Upper Arm Pseudo Aneursym ;  Surgeon: John Payton MD;  [facility]    ENT SURGERY      EP INSERT ICD Left 2/28/2023    Procedure: Insert Implantable Cardioverter-Defibrillator (ICD);  Surgeon: Jaylen George MD;  Location:  HEART CARDIAC CATH LAB    ESOPHAGOSCOPY, GASTROSCOPY, DUODENOSCOPY (EGD), COMBINED  5/30/2014    Procedure: COMBINED ESOPHAGOSCOPY, GASTROSCOPY, DUODENOSCOPY (EGD);  Surgeon: Gulilaume Bautista MD;  Location:  GI    ESOPHAGOSCOPY, GASTROSCOPY, DUODENOSCOPY (EGD), COMBINED  9/30/14    ESOPHAGOSCOPY, GASTROSCOPY, DUODENOSCOPY (EGD), COMBINED Left 3/12/2015    Procedure: COMBINED ESOPHAGOSCOPY, GASTROSCOPY, DUODENOSCOPY (EGD);  Surgeon: Laureen Galvan MD;  Location:  GI    ESOPHAGOSCOPY, GASTROSCOPY, DUODENOSCOPY (EGD), COMBINED N/A 4/21/2016    Procedure: COMBINED ESOPHAGOSCOPY, GASTROSCOPY, DUODENOSCOPY (EGD);  Surgeon: Laureen Galvan MD;  Location:  GI    ESOPHAGOSCOPY, GASTROSCOPY, DUODENOSCOPY (EGD), COMBINED N/A 7/21/2016    Procedure: COMBINED ESOPHAGOSCOPY, GASTROSCOPY, DUODENOSCOPY (EGD);  Surgeon: Laureen Galvan MD;  Location:  GI    [facility] OR CATH ABLATION NON-CARDIAC ENDOVASCULAR  1990,2002    SVT    INSERT PORT VASCULAR ACCESS N/A 4/3/2017    Procedure: INSERT PORT VASCULAR ACCESS;  Surgeon: Rajendra Jacques PA-C;  Location: [facility]    IR PORT REMOVAL LEFT  1/22/2019    LIGATE FISTULA ARTERIOVENOUS UPPER EXTREMITY Right 11/7/2017    Procedure: LIGATE FISTULA ARTERIOVENOUS UPPER EXTREMITY;  Revision of Right Arm Arteriovenous Fistula ;  Surgeon: Padmaja Eaton MD;  Location: [facility]    PERCUTANEOUS BIOPSY KIDNEY Right 9/26/2018    Procedure: PERCUTANEOUS BIOPSY KIDNEY;  Right Kidney Biopsy;  Surgeon: Yuval Khan MD;  [facility]    PERCUTANEOUS BIOPSY KIDNEY Right 10/30/2019    Procedure:  Right Side Kidney Biopsy;  Surgeon: Jake Sidhu MD;  Location: [facility]    PICC INSERTION  14    PICC line placement 14; Removal 2014    REMOVE PORT VASCULAR ACCESS Right 2019    Procedure: Right chest Port Removal;  Surgeon: Erasmo Ziegler PA-C;  Location:  OR    THORACIC SURGERY  1980    Transposition great arteries, repaired at 8 months    THYROIDECTOMY Right 2017    Procedure: THYROIDECTOMY;  Bilateral Total Thyroidectomy ;  Surgeon: Beka Soto MD;  Location: [facility]    TRANSPLANT KIDNEY RECIPIENT  DONOR  5/10/2016    Procedure: TRANSPLANT KIDNEY RECIPIENT  DONOR;  Surgeon: Rciky Deshpande MD;  Location: [facility]    TRANSPLANT LIVER RECIPIENT  DONOR N/A 5/10/2016    Procedure: TRANSPLANT LIVER RECIPIENT  DONOR;  Surgeon: Ricky Deshpande MD;  Location: [facility]        Family History   Problem Relation Age of Onset    No Known Problems Brother     Arthritis Father     Hypertension Father     Hypertension Mother     Anxiety Disorder Mother     Hyperlipidemia Mother     Hypertension Sister     Anxiety Disorder Sister     No Known Problems Maternal Grandmother     No Known Problems Maternal Grandfather     No Known Problems Paternal Grandmother     No Known Problems Brother     No Known Problems Paternal Grandfather     Alcohol/Drug No family hx of     Gastrointestinal Disease No family hx of         no fam hx of liver disease or liver cancer       Social History     Socioeconomic History    Marital status: Single     Spouse name: Not on file    Number of children: 0    Years of education: Not on file    Highest education level: High school graduate   Occupational History     Employer: UNEMPLOYED   Tobacco Use    Smoking status: Former     Packs/day: 1.00     Years: 3.00     Additional pack years: 0.00     Total pack years: 3.00     Types: Cigarettes     Start date: 1997     Quit date: 2000     Years since quittin.1     Smokeless tobacco: Former     Quit date: 9/10/2001    Tobacco comments:     Quit chewing in early 2000s   Vaping Use    Vaping Use: Never used   Substance and Sexual Activity    Alcohol use: No     Alcohol/week: 0.0 standard drinks of alcohol     Comment: ~10 drinks per day for ten years, quit in April of 2014    Drug use: No     Types: Marijuana     Comment: in HS    Sexual activity: Not Currently     Partners: Female     Birth control/protection: None   Other Topics Concern    Parent/sibling w/ CABG, MI or angioplasty before 65F 55M? No   Social History Narrative    ? Blood transfusion as baby during surgery,    Professional performed tattoos     No Illicit IV or intranasal drug use.      Social Determinants of Health     Financial Resource Strain: Low Risk  (10/15/2020)    Overall Financial Resource Strain (CARDIA)     Difficulty of Paying Living Expenses: Not hard at all   Food Insecurity: No Food Insecurity (10/15/2020)    Hunger Vital Sign     Worried About Running Out of Food in the Last Year: Never true     Ran Out of Food in the Last Year: Never true   Transportation Needs: No Transportation Needs (10/15/2020)    PRAPARE - Transportation     Lack of Transportation (Medical): No     Lack of Transportation (Non-Medical): No   Physical Activity: Not on file   Stress: No Stress Concern Present (10/15/2020)    [employer] - Occupational Stress Questionnaire     Feeling of Stress : Only a little   Social Connections: Unknown (10/15/2020)    Social Connection and Isolation Panel [NHANES]     Frequency of Communication with Friends and Family: Not asked     Frequency of Social Gatherings with Friends and Family: Not asked     Attends Jewish Services: Not asked     Active Member of Clubs or Organizations: Not asked     Attends Club or Organization Meetings: Not asked     Marital Status: Never    Interpersonal Safety: Not At Risk (10/15/2020)    Humiliation, Afraid, Rape, and  Kick questionnaire     Fear of Current or Ex-Partner: No     Emotionally Abused: No     Physically Abused: No     Sexually Abused: No   Housing Stability: Not on file       Outpatient Encounter Medications as of 10/25/2023   Medication Sig Dispense Refill    fluocinonide (LIDEX) 0.05 % external solution Apply sparingly twice daily to scalp  as needed. 60 mL 5    ketoconazole (NIZORAL) 2 % external cream Apply daily to affected area on face. 30 g 7    ketoconazole (NIZORAL) 2 % external shampoo Leave on scalp for 5 minutes then rinse. Use 2-3 time weekly. 120 mL 6    tacrolimus (PROTOPIC) 0.1 % external ointment Apply sparingly daily to face then reduce in 2-3 time weekly. 30 g 5    apixaban ANTICOAGULANT (ELIQUIS ANTICOAGULANT) 5 MG tablet Take 1 tablet (5 mg) by mouth 2 times daily 180 tablet 2    levothyroxine (SYNTHROID/LEVOTHROID) 137 MCG tablet Take 1 tablet (137 mcg) by mouth daily 90 tablet 3    lisinopril (ZESTRIL) 20 MG tablet TAKE ONE TABLET BY MOUTH ONCE DAILY 90 tablet 3    LORazepam (ATIVAN) 1 MG tablet Take 1 tablet (1 mg) by mouth daily as needed (severe anxiety/panic/sleep) ++NEEDS VISIT BEFORE NEXT FILL++ 10 tablet 0    magnesium oxide (MAG-OX) 400 MG tablet TAKE ONE TABLET BY MOUTH TWICE A  tablet 11    metoprolol succinate ER (TOPROL XL) 25 MG 24 hr tablet Take 2 tablets (50 mg) by mouth every morning AND 1 tablet (25 mg) every evening. 270 tablet 3    mycophenolate (GENERIC EQUIVALENT) 250 MG capsule Take 2 capsules (500 mg) by mouth 2 times daily 120 capsule 11    sulfamethoxazole-trimethoprim (BACTRIM) 400-80 MG tablet TAKE ONE TABLET BY MOUTH ONCE DAILY 30 tablet 11    tacrolimus (GENERIC EQUIVALENT) 0.5 MG capsule TAKE ONE CAPSULE BY MOUTH EVERY MORNING (TOTAL DOSE IS 1.5MG IN  THE MORNING AND 1MG IN THE EVENING) 90 capsule 3    tacrolimus (GENERIC EQUIVALENT) 1 MG capsule TAKE ONE CAPSULE BY MOUTH TWICE A DAY (TOTAL DOSE IS 1.5MG IN THE MORNING AND 1MG IN THE EVENING) 180 capsule 3     traZODone (DESYREL) 50 MG tablet Take 1 tablet (50 mg) by mouth at bedtime as needed, may repeat once for sleep 180 tablet 0     No facility-administered encounter medications on file as of 10/25/2023.             O:   NAD, WDWN, Alert & Oriented, Mood & Affect wnl, Vitals stable   Here today alone   There were no vitals taken for this visit.   General appearance normal   Vitals stable   Alert, oriented and in no acute distress      Pink scaly thin plaques on face, scalp, bilateral cheeks, hairline, eyebrows   Stuck on papules and brown macules on trunk and ext   Red papules on trunk  Brown papules and macules with regular pigment network and borders on torso and extremities      The remainder of skin exam is normal      Eyes: Conjunctivae/lids:Normal     ENT: Lips normal    MSK:Normal    Cardiovascular: peripheral edema none    Pulm: Breathing Normal    Neuro/Psych: Orientation:Alert and Orientedx3 ; Mood/Affect:normal   A/P:  Seborrheic eczema  Discussed chronic problem.   Use ketoconazole  shampoo, leave on for 5 minutes then rinse, use 2-3 times weekly.  Apply ketoconazole cream to face in am.  Apply protopic sparingly to face in the evening.   Apply lidex solution as needed.    Seborrheic keratosis, lentigo, angioma, benign nevi, history of transplant    It was a pleasure speaking to Cricket Chen today.  BENIGN LESIONS DISCUSSED WITH PATIENT:  I discussed the specifics of tumor, prognosis, and genetics of benign lesions.  I explained that treatment of these lesions would be purely cosmetic and not medically neccessary.  I discussed with patient different removal options including excision, cautery and /or laser.      Nature and genetics of benign skin lesions dicussed with patient.  Signs and Symptoms of skin cancer discussed with patient.  ABCDEs of melanoma reviewed with patient.  Patient encouraged to perform monthly skin exams.  UV precautions reviewed with patient.  Risks of non-melanoma skin cancer  discussed with patient   Return to clinic as needed.

## 2023-11-27 DIAGNOSIS — E89.0 POSTOPERATIVE HYPOTHYROIDISM: ICD-10-CM

## 2023-11-27 DIAGNOSIS — C73 PAPILLARY THYROID CARCINOMA (H): ICD-10-CM

## 2023-11-30 NOTE — TELEPHONE ENCOUNTER
levothyroxine (SYNTHROID/LEVOTHROID) 137 MCG tablet 90 tablet 3 10/14/2022     Last Office Visit: 10/14/22  Future Office visit:  NONE  TSH   Date Value Ref Range Status   11/01/2022 0.33 (L) 0.40 - 4.00 mU/L Final   05/14/2021 0.16 (L) 0.40 - 4.00 mU/L Final       Routing refill request to provider for review/approval because:  ABNORMAL AND OVERDUE LAB    Dasha Price RN

## 2023-12-01 RX ORDER — LEVOTHYROXINE SODIUM 137 UG/1
137 TABLET ORAL DAILY
Qty: 90 TABLET | Refills: 1 | Status: SHIPPED | OUTPATIENT
Start: 2023-12-01 | End: 2024-03-20

## 2023-12-05 ENCOUNTER — LAB (OUTPATIENT)
Dept: LAB | Facility: CLINIC | Age: 43
End: 2023-12-05
Payer: COMMERCIAL

## 2023-12-05 DIAGNOSIS — Z94.4 LIVER REPLACED BY TRANSPLANT (H): ICD-10-CM

## 2023-12-05 LAB
ALBUMIN SERPL BCG-MCNC: 4.5 G/DL (ref 3.5–5.2)
ALP SERPL-CCNC: 61 U/L (ref 40–150)
ALT SERPL W P-5'-P-CCNC: 15 U/L (ref 0–70)
ANION GAP SERPL CALCULATED.3IONS-SCNC: 10 MMOL/L (ref 7–15)
AST SERPL W P-5'-P-CCNC: 23 U/L (ref 0–45)
BILIRUB DIRECT SERPL-MCNC: <0.2 MG/DL (ref 0–0.3)
BILIRUB SERPL-MCNC: 0.4 MG/DL
BUN SERPL-MCNC: 21.1 MG/DL (ref 6–20)
CALCIUM SERPL-MCNC: 9.6 MG/DL (ref 8.6–10)
CHLORIDE SERPL-SCNC: 103 MMOL/L (ref 98–107)
CREAT SERPL-MCNC: 1.3 MG/DL (ref 0.67–1.17)
DEPRECATED HCO3 PLAS-SCNC: 25 MMOL/L (ref 22–29)
EGFRCR SERPLBLD CKD-EPI 2021: 70 ML/MIN/1.73M2
ERYTHROCYTE [DISTWIDTH] IN BLOOD BY AUTOMATED COUNT: 12.5 % (ref 10–15)
GLUCOSE SERPL-MCNC: 103 MG/DL (ref 70–99)
HCT VFR BLD AUTO: 46.9 % (ref 40–53)
HGB BLD-MCNC: 15.6 G/DL (ref 13.3–17.7)
MCH RBC QN AUTO: 30 PG (ref 26.5–33)
MCHC RBC AUTO-ENTMCNC: 33.3 G/DL (ref 31.5–36.5)
MCV RBC AUTO: 90 FL (ref 78–100)
PLATELET # BLD AUTO: 143 10E3/UL (ref 150–450)
POTASSIUM SERPL-SCNC: 4.5 MMOL/L (ref 3.4–5.3)
PROT SERPL-MCNC: 7 G/DL (ref 6.4–8.3)
RBC # BLD AUTO: 5.2 10E6/UL (ref 4.4–5.9)
SODIUM SERPL-SCNC: 138 MMOL/L (ref 135–145)
TACROLIMUS BLD-MCNC: 4.3 UG/L (ref 5–15)
TME LAST DOSE: ABNORMAL H
TME LAST DOSE: ABNORMAL H
WBC # BLD AUTO: 5.4 10E3/UL (ref 4–11)

## 2023-12-05 PROCEDURE — 82248 BILIRUBIN DIRECT: CPT

## 2023-12-05 PROCEDURE — 80053 COMPREHEN METABOLIC PANEL: CPT

## 2023-12-05 PROCEDURE — 85027 COMPLETE CBC AUTOMATED: CPT

## 2023-12-05 PROCEDURE — 36415 COLL VENOUS BLD VENIPUNCTURE: CPT

## 2023-12-05 PROCEDURE — 80197 ASSAY OF TACROLIMUS: CPT

## 2023-12-15 ENCOUNTER — OFFICE VISIT (OUTPATIENT)
Dept: FAMILY MEDICINE | Facility: CLINIC | Age: 43
End: 2023-12-15
Attending: PHYSICIAN ASSISTANT
Payer: COMMERCIAL

## 2023-12-15 VITALS
HEIGHT: 70 IN | RESPIRATION RATE: 22 BRPM | WEIGHT: 209.6 LBS | BODY MASS INDEX: 30.01 KG/M2 | DIASTOLIC BLOOD PRESSURE: 83 MMHG | HEART RATE: 64 BPM | TEMPERATURE: 97.8 F | SYSTOLIC BLOOD PRESSURE: 132 MMHG | OXYGEN SATURATION: 99 %

## 2023-12-15 DIAGNOSIS — Z00.00 ENCOUNTER FOR MEDICARE ANNUAL WELLNESS EXAM: Primary | ICD-10-CM

## 2023-12-15 DIAGNOSIS — E89.0 POSTOPERATIVE HYPOTHYROIDISM: ICD-10-CM

## 2023-12-15 DIAGNOSIS — C73 PAPILLARY THYROID CARCINOMA (H): ICD-10-CM

## 2023-12-15 DIAGNOSIS — F41.0 PANIC DISORDER WITHOUT AGORAPHOBIA: ICD-10-CM

## 2023-12-15 DIAGNOSIS — N18.2 CKD (CHRONIC KIDNEY DISEASE) STAGE 2, GFR 60-89 ML/MIN: ICD-10-CM

## 2023-12-15 PROCEDURE — 99213 OFFICE O/P EST LOW 20 MIN: CPT | Mod: 25 | Performed by: PHYSICIAN ASSISTANT

## 2023-12-15 PROCEDURE — 99396 PREV VISIT EST AGE 40-64: CPT | Performed by: PHYSICIAN ASSISTANT

## 2023-12-15 RX ORDER — LORAZEPAM 1 MG/1
1 TABLET ORAL DAILY PRN
Qty: 10 TABLET | Refills: 0 | Status: SHIPPED | OUTPATIENT
Start: 2023-12-15

## 2023-12-15 ASSESSMENT — ENCOUNTER SYMPTOMS
COUGH: 0
ARTHRALGIAS: 0
DYSURIA: 0
EYE PAIN: 0
NAUSEA: 0
FEVER: 0
PALPITATIONS: 0
HEARTBURN: 0
WEAKNESS: 0
FREQUENCY: 0
MYALGIAS: 0
CONSTIPATION: 0
ABDOMINAL PAIN: 0
SORE THROAT: 0
SHORTNESS OF BREATH: 0
CHILLS: 0
NERVOUS/ANXIOUS: 0
DIARRHEA: 0
HEMATURIA: 0
DIZZINESS: 0
JOINT SWELLING: 0
HEMATOCHEZIA: 0
HEADACHES: 0
PARESTHESIAS: 0

## 2023-12-15 ASSESSMENT — ANXIETY QUESTIONNAIRES
3. WORRYING TOO MUCH ABOUT DIFFERENT THINGS: NOT AT ALL
4. TROUBLE RELAXING: NOT AT ALL
6. BECOMING EASILY ANNOYED OR IRRITABLE: NOT AT ALL
IF YOU CHECKED OFF ANY PROBLEMS ON THIS QUESTIONNAIRE, HOW DIFFICULT HAVE THESE PROBLEMS MADE IT FOR YOU TO DO YOUR WORK, TAKE CARE OF THINGS AT HOME, OR GET ALONG WITH OTHER PEOPLE: NOT DIFFICULT AT ALL
7. FEELING AFRAID AS IF SOMETHING AWFUL MIGHT HAPPEN: NOT AT ALL
2. NOT BEING ABLE TO STOP OR CONTROL WORRYING: NOT AT ALL
GAD7 TOTAL SCORE: 1
7. FEELING AFRAID AS IF SOMETHING AWFUL MIGHT HAPPEN: NOT AT ALL
1. FEELING NERVOUS, ANXIOUS, OR ON EDGE: SEVERAL DAYS
3. WORRYING TOO MUCH ABOUT DIFFERENT THINGS: NOT AT ALL
1. FEELING NERVOUS, ANXIOUS, OR ON EDGE: SEVERAL DAYS
5. BEING SO RESTLESS THAT IT IS HARD TO SIT STILL: NOT AT ALL
IF YOU CHECKED OFF ANY PROBLEMS ON THIS QUESTIONNAIRE, HOW DIFFICULT HAVE THESE PROBLEMS MADE IT FOR YOU TO DO YOUR WORK, TAKE CARE OF THINGS AT HOME, OR GET ALONG WITH OTHER PEOPLE: NOT DIFFICULT AT ALL
6. BECOMING EASILY ANNOYED OR IRRITABLE: NOT AT ALL
2. NOT BEING ABLE TO STOP OR CONTROL WORRYING: NOT AT ALL
5. BEING SO RESTLESS THAT IT IS HARD TO SIT STILL: NOT AT ALL
GAD7 TOTAL SCORE: 1
4. TROUBLE RELAXING: NOT AT ALL
GAD7 TOTAL SCORE: 1

## 2023-12-15 ASSESSMENT — PATIENT HEALTH QUESTIONNAIRE - PHQ9
10. IF YOU CHECKED OFF ANY PROBLEMS, HOW DIFFICULT HAVE THESE PROBLEMS MADE IT FOR YOU TO DO YOUR WORK, TAKE CARE OF THINGS AT HOME, OR GET ALONG WITH OTHER PEOPLE: NOT DIFFICULT AT ALL
SUM OF ALL RESPONSES TO PHQ QUESTIONS 1-9: 2

## 2023-12-15 ASSESSMENT — PAIN SCALES - GENERAL: PAINLEVEL: NO PAIN (0)

## 2023-12-15 ASSESSMENT — ACTIVITIES OF DAILY LIVING (ADL): CURRENT_FUNCTION: NO ASSISTANCE NEEDED

## 2023-12-15 NOTE — PATIENT INSTRUCTIONS
Preventive Health Recommendations  Male Ages 40 to 49    Yearly exam:             See your health care provider every year in order to  o   Review health changes.   o   Discuss preventive care.    o   Review your medicines if your doctor has prescribed any.  You should be tested each year for STDs (sexually transmitted diseases) if you re at risk.   Have a cholesterol test every 5 years.   Have a colonoscopy (test for colon cancer) beginning at age 45.  Ask your provider about other options like a yearly FIT test or Cologuard test every 3 years (stool tests)   After age 45, have a diabetes test (fasting glucose). If you are at risk for diabetes, you should have this test every 3 years.    Talk with your health care provider about whether or not a prostate cancer screening test (PSA) is right for you.    Shots: Get a flu shot each year. Get a tetanus shot every 10 years.     Nutrition:  Eat at least 5 servings of fruits and vegetables daily.   Eat whole-grain bread, whole-wheat pasta and brown rice instead of white grains and rice.   Get adequate Calcium and Vitamin D.     Lifestyle  Exercise for at least 150 minutes a week (30 minutes a day, 5 days a week). This will help you control your weight and prevent disease.   Limit alcohol to one drink per day.   No smoking.   Wear sunscreen to prevent skin cancer.   See your dentist every six months for an exam and cleaning.      Patient Education   Personalized Prevention Plan  You are due for the preventive services outlined below.  Your care team is available to assist you in scheduling these services.  If you have already completed any of these items, please share that information with your care team to update in your medical record.  Health Maintenance Due   Topic Date Due     URINE DRUG SCREEN  Never done     Annual Wellness Visit  Never done     Hepatitis B Vaccine (2 of 3 - 19+ 3-dose series) 11/11/2014     Pneumococcal Vaccine (2 - PCV) 06/19/2015     COVID-19  Vaccine (4 - 2023-24 season) 09/01/2023     ANNUAL REVIEW OF HM ORDERS  10/03/2023     Kidney Microalbumin Urine Test  11/01/2023     Thyroid Function Lab  11/01/2023

## 2023-12-15 NOTE — PROGRESS NOTES
"SUBJECTIVE:   Cricket is a 43 year old, presenting for the following:  Medicare Visit and Refill Request (Would like RX for Ativan )        12/15/2023    12:58 PM   Additional Questions   Roomed by Vanesa   Accompanied by none         12/15/2023    12:58 PM   Patient Reported Additional Medications   Patient reports taking the following new medications none       Are you in the first 12 months of your Medicare coverage?  No    Healthy Habits:     In general, how would you rate your overall health?  Excellent    Frequency of exercise:  2-3 days/week    Duration of exercise:  15-30 minutes    Do you usually eat at least 4 servings of fruit and vegetables a day, include whole grains    & fiber and avoid regularly eating high fat or \"junk\" foods?  Yes    Taking medications regularly:  Yes    Medication side effects:  None    Ability to successfully perform activities of daily living:  No assistance needed    Home Safety:  No safety concerns identified    Hearing Impairment:  No hearing concerns    In the past 6 months, have you been bothered by leaking of urine?  No    In general, how would you rate your overall mental or emotional health?  Good    Additional concerns today:  No      Dad recently passed away - suddenly. Shortly after Thanksgiving.     ICD placed 02/2023 - no issues.     Lorazepam - using infrequently. 1-2 times per week.           Today's PHQ-9 Score:       12/15/2023     1:09 PM   PHQ-9 SCORE   PHQ-9 Total Score MyChart 2 (Minimal depression)   PHQ-9 Total Score 2           Have you ever done Advance Care Planning? (For example, a Health Directive, POLST, or a discussion with a medical provider or your loved ones about your wishes): No, advance care planning information given to patient to review.  Patient declined advance care planning discussion at this time.       Fall risk- none   Fallen 2 or more times in the past year?: No  Any fall with injury in the past year?: No    Cognitive Screening Clock " score 2, Word score 3     Do you have sleep apnea, excessive snoring or daytime drowsiness? : no    Reviewed and updated as needed this visit by clinical staff   Tobacco  Allergies  Meds  Problems  Med Hx  Surg Hx  Fam Hx          Reviewed and updated as needed this visit by Provider   Tobacco  Allergies  Meds  Problems  Med Hx  Surg Hx  Fam Hx         Social History     Tobacco Use    Smoking status: Former     Packs/day: 1.00     Years: 3.00     Additional pack years: 0.00     Total pack years: 3.00     Types: Cigarettes     Start date: 1997     Quit date: 2000     Years since quittin.2     Passive exposure: Current    Smokeless tobacco: Former     Quit date: 9/10/2001    Tobacco comments:     Quit chewing in early    Substance Use Topics    Alcohol use: No     Alcohol/week: 0.0 standard drinks of alcohol     Comment: ~10 drinks per day for ten years, quit in April of 2014             12/15/2023     1:09 PM   Alcohol Use   Prescreen: >3 drinks/day or >7 drinks/week? No     Do you have a current opioid prescription? No  Do you use any other controlled substances or medications that are not prescribed by a provider? None          Hypertension Follow-up    Do you check your blood pressure regularly outside of the clinic? No   Are you following a low salt diet? Yes  Are your blood pressures ever more than 140 on the top number (systolic) OR more   than 90 on the bottom number (diastolic), for example 140/90? No    Depression and Anxiety Follow-Up  How are you doing with your depression since your last visit? No change  How are you doing with your anxiety since your last visit?  No change  Are you having other symptoms that might be associated with depression or anxiety? Yes:  always has trouble sleeping  Have you had a significant life event? Grief or Loss Father passed away a few weeks ago   Do you have any concerns with your use of alcohol or other drugs? No    Social History      Tobacco Use    Smoking status: Former     Packs/day: 1.00     Years: 3.00     Additional pack years: 0.00     Total pack years: 3.00     Types: Cigarettes     Start date: 1997     Quit date: 2000     Years since quittin.2     Passive exposure: Current    Smokeless tobacco: Former     Quit date: 9/10/2001    Tobacco comments:     Quit chewing in early    Vaping Use    Vaping Use: Never used   Substance Use Topics    Alcohol use: No     Alcohol/week: 0.0 standard drinks of alcohol     Comment: ~10 drinks per day for ten years, quit in 2014    Drug use: No     Types: Marijuana     Comment: in HS         10/3/2022     3:00 PM 12/15/2023    12:59 PM 12/15/2023     1:09 PM   PHQ   PHQ-9 Total Score 2 2 2   Q9: Thoughts of better off dead/self-harm past 2 weeks Not at all Not at all Not at all         10/3/2022     3:00 PM 12/15/2023     1:00 PM 12/15/2023     1:09 PM   DEBBIE-7 SCORE   Total Score   1 (minimal anxiety)   Total Score 2 1 1         12/15/2023     1:09 PM   Last PHQ-9   1.  Little interest or pleasure in doing things 0   2.  Feeling down, depressed, or hopeless 1   3.  Trouble falling or staying asleep, or sleeping too much 1   4.  Feeling tired or having little energy 0   5.  Poor appetite or overeating 0   6.  Feeling bad about yourself 0   7.  Trouble concentrating 0   8.  Moving slowly or restless 0   Q9: Thoughts of better off dead/self-harm past 2 weeks 0   PHQ-9 Total Score 2         12/15/2023     1:09 PM   DEBBIE-7    1. Feeling nervous, anxious, or on edge 1   2. Not being able to stop or control worrying 0   3. Worrying too much about different things 0   4. Trouble relaxing 0   5. Being so restless that it is hard to sit still 0   6. Becoming easily annoyed or irritable 0   7. Feeling afraid, as if something awful might happen 0   DEBBIE-7 Total Score 1   If you checked any problems, how difficult have they made it for you to do your work, take care of things at home, or  get along with other people? Not difficult at all       Suicide Assessment Five-step Evaluation and Treatment (SAFE-T)    Hypothyroidism Follow-up    Since last visit, patient describes the following symptoms: Weight stable, no hair loss, no skin changes, no constipation, no loose stools    Current providers sharing in care for this patient include:   Patient Care Team:  Seble Reilly PA-C as PCP - General (Family Medicine)  Laureen Galvan MD as MD (Hepatology)  Erasmo Calvo MD as Cardiologist  Jake Sidhu MD as MD (Nephrology)  Elvia Baires, RN as Registered Nurse (Transplant)  Gagandeep Corado, VAL (Inactive) as Nurse Coordinator (Cardiology)  Francesca Alba MD as MD (Cardiology)  Jaylen George MD as MD (Clinical Cardiac Electrophysiology)  Yuval Dobbins MD as MD (Dermapathology)  Zuleyma Gray MD as oYko Tejada DO as MD (Psychiatry)  Zuleyma Gray MD as Assigned Endocrinology Provider  Jake Sidhu MD as Assigned Nephrology Provider  Analisa Cerna, RN as Specialty Care Coordinator (Cardiology)  Afsaneh Mckeon, RN as Specialty Care Coordinator (Hematology & Oncology)  Cricket Kenyon MD as MD (Hematology & Oncology)  Seble Reilly PA-C as Assigned PCP  Saranya Roe PA-C as Physician Assistant (Dermatology)  Emi Perera APRN CNP as Assigned Heart and Vascular Provider    The following health maintenance items are reviewed in Epic and correct as of today:  Health Maintenance   Topic Date Due    URINE DRUG SCREEN  Never done    HEPATITIS B IMMUNIZATION (2 of 3 - 19+ 3-dose series) 11/11/2014    Pneumococcal Vaccine: Pediatrics (0 to 5 Years) and At-Risk Patients (6 to 64 Years) (2 - PCV) 06/19/2015    COVID-19 Vaccine (4 - 2023-24 season) 09/01/2023    MICROALBUMIN  11/01/2023    TSH W/FREE T4 REFLEX  11/01/2023    LIPID  08/10/2024    BMP  12/05/2024    MEDICARE ANNUAL WELLNESS VISIT   "12/15/2024    ANNUAL REVIEW OF HM ORDERS  12/15/2024    DTAP/TDAP/TD IMMUNIZATION (6 - Td or Tdap) 07/12/2026    ADVANCE CARE PLANNING  12/15/2028    HEPATITIS C SCREENING  Completed    HIV SCREENING  Completed    PHQ-2 (once per calendar year)  Completed    INFLUENZA VACCINE  Completed    URINALYSIS  Completed    IPV IMMUNIZATION  Completed    HPV IMMUNIZATION  Aged Out    MENINGITIS IMMUNIZATION  Aged Out    RSV MONOCLONAL ANTIBODY  Aged Out               Review of Systems   Constitutional:  Negative for chills and fever.   HENT:  Negative for congestion, ear pain, hearing loss and sore throat.    Eyes:  Negative for pain and visual disturbance.   Respiratory:  Negative for cough and shortness of breath.    Cardiovascular:  Negative for chest pain, palpitations and peripheral edema.   Gastrointestinal:  Negative for abdominal pain, constipation, diarrhea, heartburn, hematochezia and nausea.   Genitourinary:  Negative for dysuria, frequency, genital sores, hematuria, impotence, penile discharge and urgency.   Musculoskeletal:  Negative for arthralgias, joint swelling and myalgias.   Skin:  Negative for rash.   Neurological:  Negative for dizziness, weakness, headaches and paresthesias.   Psychiatric/Behavioral:  Negative for mood changes. The patient is not nervous/anxious.      Constitutional, HEENT, cardiovascular, pulmonary, gi and gu systems are negative, except as otherwise noted.    OBJECTIVE:   /83 (BP Location: Right arm, Patient Position: Sitting, Cuff Size: Adult Large)   Pulse 64   Temp 97.8  F (36.6  C) (Oral)   Resp 22   Ht 1.772 m (5' 9.75\")   Wt 95.1 kg (209 lb 9.6 oz)   SpO2 99%   BMI 30.29 kg/m   Estimated body mass index is 30.29 kg/m  as calculated from the following:    Height as of this encounter: 1.772 m (5' 9.75\").    Weight as of this encounter: 95.1 kg (209 lb 9.6 oz).  Physical Exam  GENERAL: healthy, alert and no distress  EYES: Eyes grossly normal to inspection, PERRL and " "conjunctivae and sclerae normal  HENT: ear canals and TM's normal, nose and mouth without ulcers or lesions  NECK: no adenopathy, no asymmetry, masses, or scars and thyroid normal to palpation  RESP: lungs clear to auscultation - no rales, rhonchi or wheezes  CV: regular rate and rhythm, normal S1 S2, no S3 or S4, no murmur, click or rub, no peripheral edema and peripheral pulses strong  ABDOMEN: soft, nontender, no hepatosplenomegaly, no masses and bowel sounds normal  MS: no gross musculoskeletal defects noted, no edema  SKIN: no suspicious lesions or rashes  NEURO: Normal strength and tone, mentation intact and speech normal  PSYCH: mentation appears normal, affect normal/bright    Diagnostic Test Results:  Labs reviewed in Epic    ASSESSMENT / PLAN:   1. Encounter for Medicare annual wellness exam  Well adult.     2. CKD (chronic kidney disease) stage 2, GFR 60-89 ml/min  Stable. Last GFR 70.     3. Postoperative hypothyroidism  5. Papillary thyroid carcinoma (H)  Due for labs- can be done when he has his next labs with gastro.   - TSH; Future  - T4, free; Future     4. Panic disorder without agoraphobia  Refilled. Discussed #10 every 2 months. If needing more, would need to start daily preventive medication. Discussed sertraline as preventive.  - LORazepam (ATIVAN) 1 MG tablet; Take 1 tablet (1 mg) by mouth daily as needed (severe anxiety/panic/sleep)  Dispense: 10 tablet; Refill: 0              COUNSELING:  Reviewed preventive health counseling, as reflected in patient instructions      BMI:   Estimated body mass index is 30.29 kg/m  as calculated from the following:    Height as of this encounter: 1.772 m (5' 9.75\").    Weight as of this encounter: 95.1 kg (209 lb 9.6 oz).         He reports that he quit smoking about 23 years ago. His smoking use included cigarettes. He started smoking about 26 years ago. He has a 3.00 pack-year smoking history. He has been exposed to tobacco smoke. He quit smokeless tobacco " use about 22 years ago.      Appropriate preventive services were discussed with this patient, including applicable screening as appropriate for fall prevention, nutrition, physical activity, Tobacco-use cessation, weight loss and cognition.  Checklist reviewing preventive services available has been given to the patient.    Reviewed patients plan of care and provided an AVS. The Intermediate Care Plan ( asthma action plan, low back pain action plan, and migraine action plan) for Cricket meets the Care Plan requirement. This Care Plan has been established and reviewed with the Patient.          Seble Reilly PA-C  [facility]    Identified Health Risks:  I have reviewed Opioid Use Disorder and Substance Use Disorder risk factors and made any needed referrals.   Answers submitted by the patient for this visit:  Patient Health Questionnaire (Submitted on 12/15/2023)  If you checked off any problems, how difficult have these problems made it for you to do your work, take care of things at home, or get along with other people?: Not difficult at all  PHQ9 TOTAL SCORE: 2  DEBBIE-7 (Submitted on 12/15/2023)  DEBBIE 7 TOTAL SCORE: 1

## 2023-12-27 ENCOUNTER — ANCILLARY PROCEDURE (OUTPATIENT)
Dept: CARDIOLOGY | Facility: CLINIC | Age: 43
End: 2023-12-27
Attending: INTERNAL MEDICINE
Payer: COMMERCIAL

## 2023-12-27 DIAGNOSIS — I48.92 ATRIAL FLUTTER, UNSPECIFIED TYPE (H): ICD-10-CM

## 2023-12-27 DIAGNOSIS — Q20.3 D-TGA (DEXTRO-TRANSPOSITION OF GREAT ARTERIES): ICD-10-CM

## 2023-12-27 DIAGNOSIS — I42.8 NICM (NONISCHEMIC CARDIOMYOPATHY) (H): ICD-10-CM

## 2023-12-27 PROCEDURE — 93295 DEV INTERROG REMOTE 1/2/MLT: CPT | Performed by: INTERNAL MEDICINE

## 2023-12-27 PROCEDURE — 93296 REM INTERROG EVL PM/IDS: CPT

## 2024-01-15 LAB
MDC_IDC_EPISODE_DTM: NORMAL
MDC_IDC_EPISODE_DURATION: 1 S
MDC_IDC_EPISODE_ID: 3
MDC_IDC_EPISODE_TYPE: NORMAL
MDC_IDC_LEAD_CONNECTION_STATUS: NORMAL
MDC_IDC_LEAD_CONNECTION_STATUS: NORMAL
MDC_IDC_LEAD_IMPLANT_DT: NORMAL
MDC_IDC_LEAD_IMPLANT_DT: NORMAL
MDC_IDC_LEAD_LOCATION: NORMAL
MDC_IDC_LEAD_LOCATION: NORMAL
MDC_IDC_LEAD_LOCATION_DETAIL_1: NORMAL
MDC_IDC_LEAD_LOCATION_DETAIL_1: NORMAL
MDC_IDC_LEAD_MFG: NORMAL
MDC_IDC_LEAD_MFG: NORMAL
MDC_IDC_LEAD_MODEL: NORMAL
MDC_IDC_LEAD_MODEL: NORMAL
MDC_IDC_LEAD_POLARITY_TYPE: NORMAL
MDC_IDC_LEAD_POLARITY_TYPE: NORMAL
MDC_IDC_LEAD_SERIAL: NORMAL
MDC_IDC_LEAD_SERIAL: NORMAL
MDC_IDC_LEAD_SPECIAL_FUNCTION: NORMAL
MDC_IDC_LEAD_SPECIAL_FUNCTION: NORMAL
MDC_IDC_MSMT_BATTERY_DTM: NORMAL
MDC_IDC_MSMT_BATTERY_REMAINING_LONGEVITY: 146 MO
MDC_IDC_MSMT_BATTERY_RRT_TRIGGER: NORMAL
MDC_IDC_MSMT_BATTERY_VOLTAGE: 3.01 V
MDC_IDC_MSMT_CAP_CHARGE_DTM: NORMAL
MDC_IDC_MSMT_CAP_CHARGE_ENERGY: 18 J
MDC_IDC_MSMT_CAP_CHARGE_TIME: 3.9 S
MDC_IDC_MSMT_CAP_CHARGE_TYPE: NORMAL
MDC_IDC_MSMT_LEADCHNL_RA_IMPEDANCE_VALUE: 551 OHM
MDC_IDC_MSMT_LEADCHNL_RA_PACING_THRESHOLD_AMPLITUDE: 0.88 V
MDC_IDC_MSMT_LEADCHNL_RA_PACING_THRESHOLD_PULSEWIDTH: 0.4 MS
MDC_IDC_MSMT_LEADCHNL_RA_SENSING_INTR_AMPL: 5.4 MV
MDC_IDC_MSMT_LEADCHNL_RV_IMPEDANCE_VALUE: 342 OHM
MDC_IDC_MSMT_LEADCHNL_RV_IMPEDANCE_VALUE: 456 OHM
MDC_IDC_MSMT_LEADCHNL_RV_PACING_THRESHOLD_AMPLITUDE: 0.75 V
MDC_IDC_MSMT_LEADCHNL_RV_PACING_THRESHOLD_PULSEWIDTH: 0.4 MS
MDC_IDC_MSMT_LEADCHNL_RV_SENSING_INTR_AMPL: 9.9 MV
MDC_IDC_PG_IMPLANT_DTM: NORMAL
MDC_IDC_PG_MFG: NORMAL
MDC_IDC_PG_MODEL: NORMAL
MDC_IDC_PG_SERIAL: NORMAL
MDC_IDC_PG_TYPE: NORMAL
MDC_IDC_SESS_CLINIC_NAME: NORMAL
MDC_IDC_SESS_DTM: NORMAL
MDC_IDC_SESS_TYPE: NORMAL
MDC_IDC_SET_BRADY_AT_MODE_SWITCH_RATE: 171 {BEATS}/MIN
MDC_IDC_SET_BRADY_LOWRATE: 50 {BEATS}/MIN
MDC_IDC_SET_BRADY_MAX_SENSOR_RATE: 150 {BEATS}/MIN
MDC_IDC_SET_BRADY_MAX_TRACKING_RATE: 150 {BEATS}/MIN
MDC_IDC_SET_BRADY_MODE: NORMAL
MDC_IDC_SET_BRADY_PAV_DELAY_LOW: 180 MS
MDC_IDC_SET_BRADY_SAV_DELAY_LOW: 150 MS
MDC_IDC_SET_LEADCHNL_RA_PACING_AMPLITUDE: 1.5 V
MDC_IDC_SET_LEADCHNL_RA_PACING_ANODE_ELECTRODE_1: NORMAL
MDC_IDC_SET_LEADCHNL_RA_PACING_ANODE_LOCATION_1: NORMAL
MDC_IDC_SET_LEADCHNL_RA_PACING_CAPTURE_MODE: NORMAL
MDC_IDC_SET_LEADCHNL_RA_PACING_CATHODE_ELECTRODE_1: NORMAL
MDC_IDC_SET_LEADCHNL_RA_PACING_CATHODE_LOCATION_1: NORMAL
MDC_IDC_SET_LEADCHNL_RA_PACING_POLARITY: NORMAL
MDC_IDC_SET_LEADCHNL_RA_PACING_PULSEWIDTH: 0.4 MS
MDC_IDC_SET_LEADCHNL_RA_SENSING_ANODE_ELECTRODE_1: NORMAL
MDC_IDC_SET_LEADCHNL_RA_SENSING_ANODE_LOCATION_1: NORMAL
MDC_IDC_SET_LEADCHNL_RA_SENSING_CATHODE_ELECTRODE_1: NORMAL
MDC_IDC_SET_LEADCHNL_RA_SENSING_CATHODE_LOCATION_1: NORMAL
MDC_IDC_SET_LEADCHNL_RA_SENSING_POLARITY: NORMAL
MDC_IDC_SET_LEADCHNL_RA_SENSING_SENSITIVITY: 0.3 MV
MDC_IDC_SET_LEADCHNL_RV_PACING_AMPLITUDE: 2 V
MDC_IDC_SET_LEADCHNL_RV_PACING_ANODE_ELECTRODE_1: NORMAL
MDC_IDC_SET_LEADCHNL_RV_PACING_ANODE_LOCATION_1: NORMAL
MDC_IDC_SET_LEADCHNL_RV_PACING_CAPTURE_MODE: NORMAL
MDC_IDC_SET_LEADCHNL_RV_PACING_CATHODE_ELECTRODE_1: NORMAL
MDC_IDC_SET_LEADCHNL_RV_PACING_CATHODE_LOCATION_1: NORMAL
MDC_IDC_SET_LEADCHNL_RV_PACING_POLARITY: NORMAL
MDC_IDC_SET_LEADCHNL_RV_PACING_PULSEWIDTH: 0.4 MS
MDC_IDC_SET_LEADCHNL_RV_SENSING_ANODE_ELECTRODE_1: NORMAL
MDC_IDC_SET_LEADCHNL_RV_SENSING_ANODE_LOCATION_1: NORMAL
MDC_IDC_SET_LEADCHNL_RV_SENSING_CATHODE_ELECTRODE_1: NORMAL
MDC_IDC_SET_LEADCHNL_RV_SENSING_CATHODE_LOCATION_1: NORMAL
MDC_IDC_SET_LEADCHNL_RV_SENSING_POLARITY: NORMAL
MDC_IDC_SET_LEADCHNL_RV_SENSING_SENSITIVITY: 0.3 MV
MDC_IDC_SET_ZONE_DETECTION_BEATS_DENOMINATOR: 16 {BEATS}
MDC_IDC_SET_ZONE_DETECTION_BEATS_DENOMINATOR: 32 {BEATS}
MDC_IDC_SET_ZONE_DETECTION_BEATS_DENOMINATOR: 40 {BEATS}
MDC_IDC_SET_ZONE_DETECTION_BEATS_NUMERATOR: 16 {BEATS}
MDC_IDC_SET_ZONE_DETECTION_BEATS_NUMERATOR: 30 {BEATS}
MDC_IDC_SET_ZONE_DETECTION_BEATS_NUMERATOR: 32 {BEATS}
MDC_IDC_SET_ZONE_DETECTION_INTERVAL: 300 MS
MDC_IDC_SET_ZONE_DETECTION_INTERVAL: 350 MS
MDC_IDC_SET_ZONE_DETECTION_INTERVAL: 350 MS
MDC_IDC_SET_ZONE_DETECTION_INTERVAL: 360 MS
MDC_IDC_SET_ZONE_STATUS: NORMAL
MDC_IDC_SET_ZONE_TYPE: NORMAL
MDC_IDC_SET_ZONE_VENDOR_TYPE: NORMAL
MDC_IDC_STAT_AT_BURDEN_PERCENT: 0.1 %
MDC_IDC_STAT_AT_DTM_END: NORMAL
MDC_IDC_STAT_AT_DTM_START: NORMAL
MDC_IDC_STAT_BRADY_DTM_END: NORMAL
MDC_IDC_STAT_BRADY_DTM_START: NORMAL
MDC_IDC_STAT_BRADY_RA_PERCENT_PACED: 2.9 %
MDC_IDC_STAT_BRADY_RV_PERCENT_PACED: 0.04 %
MDC_IDC_STAT_CRT_DTM_END: NORMAL
MDC_IDC_STAT_CRT_DTM_START: NORMAL
MDC_IDC_STAT_EPISODE_RECENT_COUNT: 0
MDC_IDC_STAT_EPISODE_RECENT_COUNT: 1
MDC_IDC_STAT_EPISODE_RECENT_COUNT_DTM_END: NORMAL
MDC_IDC_STAT_EPISODE_RECENT_COUNT_DTM_START: NORMAL
MDC_IDC_STAT_EPISODE_TOTAL_COUNT: 0
MDC_IDC_STAT_EPISODE_TOTAL_COUNT: 3
MDC_IDC_STAT_EPISODE_TOTAL_COUNT_DTM_END: NORMAL
MDC_IDC_STAT_EPISODE_TOTAL_COUNT_DTM_START: NORMAL
MDC_IDC_STAT_EPISODE_TYPE: NORMAL
MDC_IDC_STAT_TACHYTHERAPY_ATP_DELIVERED_RECENT: 0
MDC_IDC_STAT_TACHYTHERAPY_ATP_DELIVERED_TOTAL: 0
MDC_IDC_STAT_TACHYTHERAPY_RECENT_DTM_END: NORMAL
MDC_IDC_STAT_TACHYTHERAPY_RECENT_DTM_START: NORMAL
MDC_IDC_STAT_TACHYTHERAPY_SHOCKS_ABORTED_RECENT: 0
MDC_IDC_STAT_TACHYTHERAPY_SHOCKS_ABORTED_TOTAL: 0
MDC_IDC_STAT_TACHYTHERAPY_SHOCKS_DELIVERED_RECENT: 0
MDC_IDC_STAT_TACHYTHERAPY_SHOCKS_DELIVERED_TOTAL: 0
MDC_IDC_STAT_TACHYTHERAPY_TOTAL_DTM_END: NORMAL
MDC_IDC_STAT_TACHYTHERAPY_TOTAL_DTM_START: NORMAL

## 2024-02-06 ENCOUNTER — LAB (OUTPATIENT)
Dept: LAB | Facility: CLINIC | Age: 44
End: 2024-02-06
Payer: COMMERCIAL

## 2024-02-06 DIAGNOSIS — C73 PAPILLARY THYROID CARCINOMA (H): ICD-10-CM

## 2024-02-06 DIAGNOSIS — Z94.4 LIVER REPLACED BY TRANSPLANT (H): ICD-10-CM

## 2024-02-06 LAB
ALBUMIN SERPL BCG-MCNC: 4.6 G/DL (ref 3.5–5.2)
ALP SERPL-CCNC: 62 U/L (ref 40–150)
ALT SERPL W P-5'-P-CCNC: 27 U/L (ref 0–70)
ANION GAP SERPL CALCULATED.3IONS-SCNC: 10 MMOL/L (ref 7–15)
AST SERPL W P-5'-P-CCNC: 35 U/L (ref 0–45)
BILIRUB DIRECT SERPL-MCNC: <0.2 MG/DL (ref 0–0.3)
BILIRUB SERPL-MCNC: 0.5 MG/DL
BUN SERPL-MCNC: 18.6 MG/DL (ref 6–20)
CALCIUM SERPL-MCNC: 9.8 MG/DL (ref 8.6–10)
CHLORIDE SERPL-SCNC: 103 MMOL/L (ref 98–107)
CREAT SERPL-MCNC: 1.29 MG/DL (ref 0.67–1.17)
DEPRECATED HCO3 PLAS-SCNC: 24 MMOL/L (ref 22–29)
EGFRCR SERPLBLD CKD-EPI 2021: 71 ML/MIN/1.73M2
ERYTHROCYTE [DISTWIDTH] IN BLOOD BY AUTOMATED COUNT: 12.4 % (ref 10–15)
GLUCOSE SERPL-MCNC: 117 MG/DL (ref 70–99)
HCT VFR BLD AUTO: 47.1 % (ref 40–53)
HGB BLD-MCNC: 15.9 G/DL (ref 13.3–17.7)
MCH RBC QN AUTO: 30.5 PG (ref 26.5–33)
MCHC RBC AUTO-ENTMCNC: 33.8 G/DL (ref 31.5–36.5)
MCV RBC AUTO: 90 FL (ref 78–100)
PLATELET # BLD AUTO: 146 10E3/UL (ref 150–450)
POTASSIUM SERPL-SCNC: 5.7 MMOL/L (ref 3.4–5.3)
PROT SERPL-MCNC: 7.3 G/DL (ref 6.4–8.3)
RBC # BLD AUTO: 5.21 10E6/UL (ref 4.4–5.9)
SODIUM SERPL-SCNC: 137 MMOL/L (ref 135–145)
T4 FREE SERPL-MCNC: 1.39 NG/DL (ref 0.9–1.7)
TACROLIMUS BLD-MCNC: 3.9 UG/L (ref 5–15)
TME LAST DOSE: ABNORMAL H
TME LAST DOSE: ABNORMAL H
TSH SERPL DL<=0.005 MIU/L-ACNC: 1.08 UIU/ML (ref 0.3–4.2)
WBC # BLD AUTO: 4.6 10E3/UL (ref 4–11)

## 2024-02-06 PROCEDURE — 84439 ASSAY OF FREE THYROXINE: CPT

## 2024-02-06 PROCEDURE — 82248 BILIRUBIN DIRECT: CPT

## 2024-02-06 PROCEDURE — 80197 ASSAY OF TACROLIMUS: CPT

## 2024-02-06 PROCEDURE — 84443 ASSAY THYROID STIM HORMONE: CPT

## 2024-02-06 PROCEDURE — 36415 COLL VENOUS BLD VENIPUNCTURE: CPT

## 2024-02-06 PROCEDURE — 80053 COMPREHEN METABOLIC PANEL: CPT

## 2024-02-06 PROCEDURE — 85027 COMPLETE CBC AUTOMATED: CPT

## 2024-02-07 ENCOUNTER — TELEPHONE (OUTPATIENT)
Dept: TRANSPLANT | Facility: CLINIC | Age: 44
End: 2024-02-07
Payer: COMMERCIAL

## 2024-02-07 DIAGNOSIS — E87.5 SERUM POTASSIUM ELEVATED: Primary | ICD-10-CM

## 2024-02-07 NOTE — TELEPHONE ENCOUNTER
Pt's potassium 5.7. per dr chavez pt to push fluids and follow low potassium diet.  Left message for patient with plan and to repeat bmp in 1 week.

## 2024-02-26 ENCOUNTER — LAB (OUTPATIENT)
Dept: LAB | Facility: CLINIC | Age: 44
End: 2024-02-26
Payer: COMMERCIAL

## 2024-02-26 DIAGNOSIS — E87.5 SERUM POTASSIUM ELEVATED: ICD-10-CM

## 2024-02-26 LAB
ANION GAP SERPL CALCULATED.3IONS-SCNC: 10 MMOL/L (ref 7–15)
BUN SERPL-MCNC: 18.9 MG/DL (ref 6–20)
CALCIUM SERPL-MCNC: 9.6 MG/DL (ref 8.6–10)
CHLORIDE SERPL-SCNC: 102 MMOL/L (ref 98–107)
CREAT SERPL-MCNC: 1.28 MG/DL (ref 0.67–1.17)
DEPRECATED HCO3 PLAS-SCNC: 25 MMOL/L (ref 22–29)
EGFRCR SERPLBLD CKD-EPI 2021: 71 ML/MIN/1.73M2
GLUCOSE SERPL-MCNC: 98 MG/DL (ref 70–99)
POTASSIUM SERPL-SCNC: 4.7 MMOL/L (ref 3.4–5.3)
SODIUM SERPL-SCNC: 137 MMOL/L (ref 135–145)

## 2024-02-26 PROCEDURE — 36415 COLL VENOUS BLD VENIPUNCTURE: CPT

## 2024-02-26 PROCEDURE — 80048 BASIC METABOLIC PNL TOTAL CA: CPT

## 2024-03-18 DIAGNOSIS — C73 PAPILLARY THYROID CARCINOMA (H): ICD-10-CM

## 2024-03-18 DIAGNOSIS — I48.92 ATRIAL FLUTTER, UNSPECIFIED TYPE (H): ICD-10-CM

## 2024-03-18 DIAGNOSIS — Z94.4 LIVER REPLACED BY TRANSPLANT (H): ICD-10-CM

## 2024-03-18 DIAGNOSIS — Q20.3 COMPLETE TRANSPOSITION OF GREAT VESSELS: ICD-10-CM

## 2024-03-18 DIAGNOSIS — Z94.0 KIDNEY TRANSPLANTED: ICD-10-CM

## 2024-03-18 DIAGNOSIS — Z94.0 S/P KIDNEY TRANSPLANT: ICD-10-CM

## 2024-03-18 DIAGNOSIS — E89.0 POSTOPERATIVE HYPOTHYROIDISM: ICD-10-CM

## 2024-03-18 RX ORDER — TACROLIMUS 0.5 MG/1
CAPSULE ORAL
Qty: 90 CAPSULE | Refills: 3 | Status: SHIPPED | OUTPATIENT
Start: 2024-03-18

## 2024-03-18 RX ORDER — TACROLIMUS 1 MG/1
CAPSULE ORAL
Qty: 180 CAPSULE | Refills: 3 | Status: SHIPPED | OUTPATIENT
Start: 2024-03-18

## 2024-03-20 ENCOUNTER — TELEPHONE (OUTPATIENT)
Dept: ENDOCRINOLOGY | Facility: CLINIC | Age: 44
End: 2024-03-20
Payer: COMMERCIAL

## 2024-03-20 RX ORDER — LEVOTHYROXINE SODIUM 137 UG/1
137 TABLET ORAL DAILY
Qty: 90 TABLET | Refills: 1 | Status: SHIPPED | OUTPATIENT
Start: 2024-03-20

## 2024-03-20 NOTE — TELEPHONE ENCOUNTER
Left Voicemail (1st Attempt) for the patient to call back and schedule the following:    Appointment type: Return endocrine   Provider: Zuleyma   Return date: next avail   Specialty phone number: [phone]  Additional appointment(s) needed: NA   Additonal Notes: LVM, MyC x1   This pt will need f/up this year. Last seen 10/2022. I saw that I have opening this Friday. Can you add him if he can do a visit?    Thanks,  Zuleyma     *if visit is still available in solutions on 3/22 schedule otherwise next available*    Melissa Costa on 3/20/2024 at 1:13 PM

## 2024-03-20 NOTE — TELEPHONE ENCOUNTER
Patient states that he was told at last visit with Dr Amor that he care was being transferred back to his PCP.  Patient did not make an appointment at this time.  Pls contact patient with instructions as to where/who he should be getting further care and refills from.

## 2024-03-21 RX ORDER — LISINOPRIL 20 MG/1
TABLET ORAL
Qty: 90 TABLET | Refills: 3 | Status: SHIPPED | OUTPATIENT
Start: 2024-03-21

## 2024-03-22 RX ORDER — APIXABAN 5 MG/1
5 TABLET, FILM COATED ORAL 2 TIMES DAILY
Qty: 180 TABLET | Refills: 1 | Status: SHIPPED | OUTPATIENT
Start: 2024-03-22

## 2024-03-22 NOTE — TELEPHONE ENCOUNTER
Platlets low but stable. On Eliquis to prevent thrombus associated with hx of cardiac surgery with baffles. Due for follow-up with Dr. Melissa patel of 2024.

## 2024-03-22 NOTE — TELEPHONE ENCOUNTER
ELIQUIS 5MG TABS       Last Written Prescription Date:  6/23/23  Last Fill Quantity: 180,   # refills: 2  Last Office Visit : 6/23/23  Future Office visit:  None    Routing refill request to provider for review/approval because:  AbnPLT  02/06/24  1128    *

## 2024-03-29 ENCOUNTER — ANCILLARY PROCEDURE (OUTPATIENT)
Dept: CARDIOLOGY | Facility: CLINIC | Age: 44
End: 2024-03-29
Attending: INTERNAL MEDICINE
Payer: COMMERCIAL

## 2024-03-29 DIAGNOSIS — Q20.3 D-TGA (DEXTRO-TRANSPOSITION OF GREAT ARTERIES): ICD-10-CM

## 2024-03-29 DIAGNOSIS — I48.92 ATRIAL FLUTTER, UNSPECIFIED TYPE (H): ICD-10-CM

## 2024-03-29 DIAGNOSIS — I42.8 NICM (NONISCHEMIC CARDIOMYOPATHY) (H): ICD-10-CM

## 2024-03-29 PROCEDURE — 93295 DEV INTERROG REMOTE 1/2/MLT: CPT | Performed by: INTERNAL MEDICINE

## 2024-03-29 PROCEDURE — 93296 REM INTERROG EVL PM/IDS: CPT

## 2024-04-13 ENCOUNTER — MYC REFILL (OUTPATIENT)
Dept: FAMILY MEDICINE | Facility: CLINIC | Age: 44
End: 2024-04-13
Payer: COMMERCIAL

## 2024-04-13 DIAGNOSIS — G47.00 INSOMNIA, UNSPECIFIED TYPE: ICD-10-CM

## 2024-04-15 DIAGNOSIS — Z94.0 S/P KIDNEY TRANSPLANT: ICD-10-CM

## 2024-04-15 DIAGNOSIS — Z94.4 LIVER REPLACED BY TRANSPLANT (H): ICD-10-CM

## 2024-04-15 LAB
MDC_IDC_LEAD_CONNECTION_STATUS: NORMAL
MDC_IDC_LEAD_CONNECTION_STATUS: NORMAL
MDC_IDC_LEAD_IMPLANT_DT: NORMAL
MDC_IDC_LEAD_IMPLANT_DT: NORMAL
MDC_IDC_LEAD_LOCATION: NORMAL
MDC_IDC_LEAD_LOCATION: NORMAL
MDC_IDC_LEAD_LOCATION_DETAIL_1: NORMAL
MDC_IDC_LEAD_LOCATION_DETAIL_1: NORMAL
MDC_IDC_LEAD_MFG: NORMAL
MDC_IDC_LEAD_MFG: NORMAL
MDC_IDC_LEAD_MODEL: NORMAL
MDC_IDC_LEAD_MODEL: NORMAL
MDC_IDC_LEAD_POLARITY_TYPE: NORMAL
MDC_IDC_LEAD_POLARITY_TYPE: NORMAL
MDC_IDC_LEAD_SERIAL: NORMAL
MDC_IDC_LEAD_SERIAL: NORMAL
MDC_IDC_LEAD_SPECIAL_FUNCTION: NORMAL
MDC_IDC_LEAD_SPECIAL_FUNCTION: NORMAL
MDC_IDC_MSMT_BATTERY_DTM: NORMAL
MDC_IDC_MSMT_BATTERY_REMAINING_LONGEVITY: 144 MO
MDC_IDC_MSMT_BATTERY_RRT_TRIGGER: NORMAL
MDC_IDC_MSMT_BATTERY_STATUS: NORMAL
MDC_IDC_MSMT_BATTERY_VOLTAGE: 3.01 V
MDC_IDC_MSMT_CAP_CHARGE_DTM: NORMAL
MDC_IDC_MSMT_CAP_CHARGE_ENERGY: 18 J
MDC_IDC_MSMT_CAP_CHARGE_TIME: 3.9 S
MDC_IDC_MSMT_CAP_CHARGE_TYPE: NORMAL
MDC_IDC_MSMT_LEADCHNL_RA_IMPEDANCE_VALUE: 627 OHM
MDC_IDC_MSMT_LEADCHNL_RA_PACING_THRESHOLD_AMPLITUDE: 1.12 V
MDC_IDC_MSMT_LEADCHNL_RA_PACING_THRESHOLD_PULSEWIDTH: 0.4 MS
MDC_IDC_MSMT_LEADCHNL_RA_SENSING_INTR_AMPL: 5.3 MV
MDC_IDC_MSMT_LEADCHNL_RV_IMPEDANCE_VALUE: 342 OHM
MDC_IDC_MSMT_LEADCHNL_RV_IMPEDANCE_VALUE: 437 OHM
MDC_IDC_MSMT_LEADCHNL_RV_PACING_THRESHOLD_AMPLITUDE: 0.88 V
MDC_IDC_MSMT_LEADCHNL_RV_PACING_THRESHOLD_PULSEWIDTH: 0.4 MS
MDC_IDC_MSMT_LEADCHNL_RV_SENSING_INTR_AMPL: 10 MV
MDC_IDC_PG_IMPLANT_DTM: NORMAL
MDC_IDC_PG_MFG: NORMAL
MDC_IDC_PG_MODEL: NORMAL
MDC_IDC_PG_SERIAL: NORMAL
MDC_IDC_PG_TYPE: NORMAL
MDC_IDC_SESS_CLINIC_NAME: NORMAL
MDC_IDC_SESS_DTM: NORMAL
MDC_IDC_SESS_TYPE: NORMAL
MDC_IDC_SET_BRADY_AT_MODE_SWITCH_RATE: 171 {BEATS}/MIN
MDC_IDC_SET_BRADY_LOWRATE: 50 {BEATS}/MIN
MDC_IDC_SET_BRADY_MAX_SENSOR_RATE: 150 {BEATS}/MIN
MDC_IDC_SET_BRADY_MAX_TRACKING_RATE: 150 {BEATS}/MIN
MDC_IDC_SET_BRADY_MODE: NORMAL
MDC_IDC_SET_BRADY_PAV_DELAY_LOW: 180 MS
MDC_IDC_SET_BRADY_SAV_DELAY_LOW: 150 MS
MDC_IDC_SET_LEADCHNL_RA_PACING_AMPLITUDE: 1.75 V
MDC_IDC_SET_LEADCHNL_RA_PACING_ANODE_ELECTRODE_1: NORMAL
MDC_IDC_SET_LEADCHNL_RA_PACING_ANODE_LOCATION_1: NORMAL
MDC_IDC_SET_LEADCHNL_RA_PACING_CAPTURE_MODE: NORMAL
MDC_IDC_SET_LEADCHNL_RA_PACING_CATHODE_ELECTRODE_1: NORMAL
MDC_IDC_SET_LEADCHNL_RA_PACING_CATHODE_LOCATION_1: NORMAL
MDC_IDC_SET_LEADCHNL_RA_PACING_POLARITY: NORMAL
MDC_IDC_SET_LEADCHNL_RA_PACING_PULSEWIDTH: 0.4 MS
MDC_IDC_SET_LEADCHNL_RA_SENSING_ANODE_ELECTRODE_1: NORMAL
MDC_IDC_SET_LEADCHNL_RA_SENSING_ANODE_LOCATION_1: NORMAL
MDC_IDC_SET_LEADCHNL_RA_SENSING_CATHODE_ELECTRODE_1: NORMAL
MDC_IDC_SET_LEADCHNL_RA_SENSING_CATHODE_LOCATION_1: NORMAL
MDC_IDC_SET_LEADCHNL_RA_SENSING_POLARITY: NORMAL
MDC_IDC_SET_LEADCHNL_RA_SENSING_SENSITIVITY: 0.3 MV
MDC_IDC_SET_LEADCHNL_RV_PACING_AMPLITUDE: 2 V
MDC_IDC_SET_LEADCHNL_RV_PACING_ANODE_ELECTRODE_1: NORMAL
MDC_IDC_SET_LEADCHNL_RV_PACING_ANODE_LOCATION_1: NORMAL
MDC_IDC_SET_LEADCHNL_RV_PACING_CAPTURE_MODE: NORMAL
MDC_IDC_SET_LEADCHNL_RV_PACING_CATHODE_ELECTRODE_1: NORMAL
MDC_IDC_SET_LEADCHNL_RV_PACING_CATHODE_LOCATION_1: NORMAL
MDC_IDC_SET_LEADCHNL_RV_PACING_POLARITY: NORMAL
MDC_IDC_SET_LEADCHNL_RV_PACING_PULSEWIDTH: 0.4 MS
MDC_IDC_SET_LEADCHNL_RV_SENSING_ANODE_ELECTRODE_1: NORMAL
MDC_IDC_SET_LEADCHNL_RV_SENSING_ANODE_LOCATION_1: NORMAL
MDC_IDC_SET_LEADCHNL_RV_SENSING_CATHODE_ELECTRODE_1: NORMAL
MDC_IDC_SET_LEADCHNL_RV_SENSING_CATHODE_LOCATION_1: NORMAL
MDC_IDC_SET_LEADCHNL_RV_SENSING_POLARITY: NORMAL
MDC_IDC_SET_LEADCHNL_RV_SENSING_SENSITIVITY: 0.3 MV
MDC_IDC_SET_ZONE_DETECTION_BEATS_DENOMINATOR: 16 {BEATS}
MDC_IDC_SET_ZONE_DETECTION_BEATS_DENOMINATOR: 32 {BEATS}
MDC_IDC_SET_ZONE_DETECTION_BEATS_DENOMINATOR: 40 {BEATS}
MDC_IDC_SET_ZONE_DETECTION_BEATS_NUMERATOR: 16 {BEATS}
MDC_IDC_SET_ZONE_DETECTION_BEATS_NUMERATOR: 30 {BEATS}
MDC_IDC_SET_ZONE_DETECTION_BEATS_NUMERATOR: 32 {BEATS}
MDC_IDC_SET_ZONE_DETECTION_INTERVAL: 300 MS
MDC_IDC_SET_ZONE_DETECTION_INTERVAL: 350 MS
MDC_IDC_SET_ZONE_DETECTION_INTERVAL: 350 MS
MDC_IDC_SET_ZONE_DETECTION_INTERVAL: 360 MS
MDC_IDC_SET_ZONE_STATUS: NORMAL
MDC_IDC_SET_ZONE_TYPE: NORMAL
MDC_IDC_SET_ZONE_VENDOR_TYPE: NORMAL
MDC_IDC_STAT_AT_BURDEN_PERCENT: 0 %
MDC_IDC_STAT_AT_DTM_END: NORMAL
MDC_IDC_STAT_AT_DTM_START: NORMAL
MDC_IDC_STAT_BRADY_DTM_END: NORMAL
MDC_IDC_STAT_BRADY_DTM_START: NORMAL
MDC_IDC_STAT_BRADY_RV_PERCENT_PACED: 0.03 %
MDC_IDC_STAT_CRT_DTM_END: NORMAL
MDC_IDC_STAT_CRT_DTM_START: NORMAL
MDC_IDC_STAT_EPISODE_RECENT_COUNT: 0
MDC_IDC_STAT_EPISODE_RECENT_COUNT_DTM_END: NORMAL
MDC_IDC_STAT_EPISODE_RECENT_COUNT_DTM_START: NORMAL
MDC_IDC_STAT_EPISODE_TOTAL_COUNT: 0
MDC_IDC_STAT_EPISODE_TOTAL_COUNT: 3
MDC_IDC_STAT_EPISODE_TOTAL_COUNT_DTM_END: NORMAL
MDC_IDC_STAT_EPISODE_TOTAL_COUNT_DTM_START: NORMAL
MDC_IDC_STAT_EPISODE_TYPE: NORMAL
MDC_IDC_STAT_TACHYTHERAPY_ATP_DELIVERED_RECENT: 0
MDC_IDC_STAT_TACHYTHERAPY_ATP_DELIVERED_TOTAL: 0
MDC_IDC_STAT_TACHYTHERAPY_RECENT_DTM_END: NORMAL
MDC_IDC_STAT_TACHYTHERAPY_RECENT_DTM_START: NORMAL
MDC_IDC_STAT_TACHYTHERAPY_SHOCKS_ABORTED_RECENT: 0
MDC_IDC_STAT_TACHYTHERAPY_SHOCKS_ABORTED_TOTAL: 0
MDC_IDC_STAT_TACHYTHERAPY_SHOCKS_DELIVERED_RECENT: 0
MDC_IDC_STAT_TACHYTHERAPY_SHOCKS_DELIVERED_TOTAL: 0
MDC_IDC_STAT_TACHYTHERAPY_TOTAL_DTM_END: NORMAL
MDC_IDC_STAT_TACHYTHERAPY_TOTAL_DTM_START: NORMAL

## 2024-04-15 RX ORDER — TRAZODONE HYDROCHLORIDE 50 MG/1
50 TABLET, FILM COATED ORAL
Qty: 180 TABLET | Refills: 0 | Status: SHIPPED | OUTPATIENT
Start: 2024-04-15

## 2024-04-15 RX ORDER — SULFAMETHOXAZOLE AND TRIMETHOPRIM 400; 80 MG/1; MG/1
TABLET ORAL
Qty: 30 TABLET | Refills: 11 | Status: SHIPPED | OUTPATIENT
Start: 2024-04-15

## 2024-04-23 ENCOUNTER — LAB (OUTPATIENT)
Dept: LAB | Facility: CLINIC | Age: 44
End: 2024-04-23
Payer: COMMERCIAL

## 2024-04-23 DIAGNOSIS — Z94.4 LIVER REPLACED BY TRANSPLANT (H): ICD-10-CM

## 2024-04-23 LAB
ALBUMIN SERPL BCG-MCNC: 4.7 G/DL (ref 3.5–5.2)
ALP SERPL-CCNC: 60 U/L (ref 40–150)
ALT SERPL W P-5'-P-CCNC: 26 U/L (ref 0–70)
ANION GAP SERPL CALCULATED.3IONS-SCNC: 9 MMOL/L (ref 7–15)
AST SERPL W P-5'-P-CCNC: 24 U/L (ref 0–45)
BILIRUB DIRECT SERPL-MCNC: <0.2 MG/DL (ref 0–0.3)
BILIRUB SERPL-MCNC: 0.4 MG/DL
BUN SERPL-MCNC: 15.9 MG/DL (ref 6–20)
CALCIUM SERPL-MCNC: 9.6 MG/DL (ref 8.6–10)
CHLORIDE SERPL-SCNC: 108 MMOL/L (ref 98–107)
CREAT SERPL-MCNC: 1.45 MG/DL (ref 0.67–1.17)
DEPRECATED HCO3 PLAS-SCNC: 25 MMOL/L (ref 22–29)
EGFRCR SERPLBLD CKD-EPI 2021: 61 ML/MIN/1.73M2
ERYTHROCYTE [DISTWIDTH] IN BLOOD BY AUTOMATED COUNT: 12.4 % (ref 10–15)
GLUCOSE SERPL-MCNC: 121 MG/DL (ref 70–99)
HCT VFR BLD AUTO: 46.2 % (ref 40–53)
HGB BLD-MCNC: 15.5 G/DL (ref 13.3–17.7)
MCH RBC QN AUTO: 30.3 PG (ref 26.5–33)
MCHC RBC AUTO-ENTMCNC: 33.5 G/DL (ref 31.5–36.5)
MCV RBC AUTO: 90 FL (ref 78–100)
PLATELET # BLD AUTO: 138 10E3/UL (ref 150–450)
POTASSIUM SERPL-SCNC: 4.8 MMOL/L (ref 3.4–5.3)
PROT SERPL-MCNC: 7.1 G/DL (ref 6.4–8.3)
RBC # BLD AUTO: 5.11 10E6/UL (ref 4.4–5.9)
SODIUM SERPL-SCNC: 142 MMOL/L (ref 135–145)
TACROLIMUS BLD-MCNC: 4.8 UG/L (ref 5–15)
TME LAST DOSE: ABNORMAL H
TME LAST DOSE: ABNORMAL H
WBC # BLD AUTO: 4.8 10E3/UL (ref 4–11)

## 2024-04-23 PROCEDURE — 36415 COLL VENOUS BLD VENIPUNCTURE: CPT

## 2024-04-23 PROCEDURE — 82248 BILIRUBIN DIRECT: CPT

## 2024-04-23 PROCEDURE — 80053 COMPREHEN METABOLIC PANEL: CPT

## 2024-04-23 PROCEDURE — 85027 COMPLETE CBC AUTOMATED: CPT

## 2024-04-23 PROCEDURE — 80197 ASSAY OF TACROLIMUS: CPT

## 2024-06-14 DIAGNOSIS — Z94.0 KIDNEY TRANSPLANT RECIPIENT: Primary | ICD-10-CM

## 2024-06-14 DIAGNOSIS — Z79.60 LONG-TERM USE OF IMMUNOSUPPRESSANT MEDICATION: ICD-10-CM

## 2024-06-14 DIAGNOSIS — I48.92 ATRIAL FLUTTER, UNSPECIFIED TYPE (H): ICD-10-CM

## 2024-06-14 RX ORDER — MYCOPHENOLATE MOFETIL 250 MG/1
500 CAPSULE ORAL 2 TIMES DAILY
Qty: 120 CAPSULE | Refills: 11 | Status: SHIPPED | OUTPATIENT
Start: 2024-06-14

## 2024-06-15 RX ORDER — METOPROLOL SUCCINATE 25 MG/1
TABLET, EXTENDED RELEASE ORAL
Qty: 270 TABLET | Refills: 0 | Status: SHIPPED | OUTPATIENT
Start: 2024-06-15

## 2024-06-15 NOTE — TELEPHONE ENCOUNTER
LVD:  6/23/2023  [facility]     Rah Torres MD  Pediatric Cardiology     Refilled per protocol.

## 2024-07-10 ENCOUNTER — LAB (OUTPATIENT)
Dept: LAB | Facility: CLINIC | Age: 44
End: 2024-07-10
Payer: COMMERCIAL

## 2024-07-10 DIAGNOSIS — Z13.220 LIPID SCREENING: ICD-10-CM

## 2024-07-10 DIAGNOSIS — Z94.4 LIVER REPLACED BY TRANSPLANT (H): ICD-10-CM

## 2024-07-10 DIAGNOSIS — N18.2 CKD (CHRONIC KIDNEY DISEASE) STAGE 2, GFR 60-89 ML/MIN: Primary | ICD-10-CM

## 2024-07-10 LAB
ALBUMIN SERPL BCG-MCNC: 4.5 G/DL (ref 3.5–5.2)
ALP SERPL-CCNC: 68 U/L (ref 40–150)
ALT SERPL W P-5'-P-CCNC: 31 U/L (ref 0–70)
ANION GAP SERPL CALCULATED.3IONS-SCNC: 9 MMOL/L (ref 7–15)
AST SERPL W P-5'-P-CCNC: 33 U/L (ref 0–45)
BILIRUB DIRECT SERPL-MCNC: <0.2 MG/DL (ref 0–0.3)
BILIRUB SERPL-MCNC: 0.5 MG/DL
BUN SERPL-MCNC: 20.4 MG/DL (ref 6–20)
CALCIUM SERPL-MCNC: 9 MG/DL (ref 8.6–10)
CHLORIDE SERPL-SCNC: 103 MMOL/L (ref 98–107)
CHOLEST SERPL-MCNC: 196 MG/DL
CREAT SERPL-MCNC: 1.4 MG/DL (ref 0.67–1.17)
DEPRECATED HCO3 PLAS-SCNC: 23 MMOL/L (ref 22–29)
EGFRCR SERPLBLD CKD-EPI 2021: 64 ML/MIN/1.73M2
ERYTHROCYTE [DISTWIDTH] IN BLOOD BY AUTOMATED COUNT: 12.3 % (ref 10–15)
FASTING STATUS PATIENT QL REPORTED: YES
FASTING STATUS PATIENT QL REPORTED: YES
GLUCOSE SERPL-MCNC: 108 MG/DL (ref 70–99)
HCT VFR BLD AUTO: 46 % (ref 40–53)
HDLC SERPL-MCNC: 36 MG/DL
HGB BLD-MCNC: 15.4 G/DL (ref 13.3–17.7)
LDLC SERPL CALC-MCNC: 137 MG/DL
MCH RBC QN AUTO: 30.6 PG (ref 26.5–33)
MCHC RBC AUTO-ENTMCNC: 33.5 G/DL (ref 31.5–36.5)
MCV RBC AUTO: 92 FL (ref 78–100)
NONHDLC SERPL-MCNC: 160 MG/DL
PLATELET # BLD AUTO: 143 10E3/UL (ref 150–450)
POTASSIUM SERPL-SCNC: 4.9 MMOL/L (ref 3.4–5.3)
PROT SERPL-MCNC: 7 G/DL (ref 6.4–8.3)
RBC # BLD AUTO: 5.03 10E6/UL (ref 4.4–5.9)
SODIUM SERPL-SCNC: 135 MMOL/L (ref 135–145)
TACROLIMUS BLD-MCNC: 5 UG/L (ref 5–15)
TME LAST DOSE: NORMAL H
TME LAST DOSE: NORMAL H
TRIGL SERPL-MCNC: 116 MG/DL
WBC # BLD AUTO: 5.4 10E3/UL (ref 4–11)

## 2024-07-10 PROCEDURE — 85027 COMPLETE CBC AUTOMATED: CPT

## 2024-07-10 PROCEDURE — 80061 LIPID PANEL: CPT

## 2024-07-10 PROCEDURE — 80053 COMPREHEN METABOLIC PANEL: CPT

## 2024-07-10 PROCEDURE — 36415 COLL VENOUS BLD VENIPUNCTURE: CPT

## 2024-07-10 PROCEDURE — 82248 BILIRUBIN DIRECT: CPT

## 2024-07-10 PROCEDURE — 80197 ASSAY OF TACROLIMUS: CPT

## 2024-07-19 DIAGNOSIS — E83.42 HYPOMAGNESEMIA: ICD-10-CM

## 2024-07-22 ENCOUNTER — TELEPHONE (OUTPATIENT)
Dept: TRANSPLANT | Facility: CLINIC | Age: 44
End: 2024-07-22
Payer: COMMERCIAL

## 2024-07-22 DIAGNOSIS — Z94.4 LIVER REPLACED BY TRANSPLANT (H): Primary | ICD-10-CM

## 2024-07-22 RX ORDER — MAGNESIUM OXIDE 400 MG/1
400 TABLET ORAL 2 TIMES DAILY
Qty: 120 TABLET | Refills: 11 | Status: SHIPPED | OUTPATIENT
Start: 2024-07-22

## 2024-08-20 NOTE — PATIENT INSTRUCTIONS
Thank you for visiting the Adult Congenital and Cardiovascular Genetics Clinic at the [facility].    Cardiology Providers you saw during your visit:  Rah Torres MD and DARYN Rios    Diagnosis:  D-TGA    Results:  Rah Torres MD and DARYN Rios reviewed the results of your labs, ECHO, EKG and device check testing today in clinic.    If you have questions or concerns, please call us at [phone] or contact us through MyChart.  ______________________________________________________________________________    Recommendations from your Cardiology Provider:    Have labs completed for Dr Torres while you have your lab draw on 9/4      General Cardiac Recommendations:  Continue to eat a heart healthy, low salt diet.  Continue to get 20-30 minutes of aerobic activity, 4-5 days per week.  Examples of aerobic activity include walking, running, swimming, cycling, etc.  Continue to observe good oral hygiene, with regular dental visits.    ____________________________________________________________________________________________________    SBE prophylaxis:   Yes____  No__X__     SBE prophylaxis applies to patients with certain heart conditions who are recommended to take antibiotics before dental appointments and other specific procedures. These antibiotics are to help prevent an infection of the heart (endocarditis) that certain patients are at higher risk of developing. The guidelines used come from the American Heart Association and are periodically updated.    ____________________________________________________________________________________________________    FASTING CHOLESTEROL was checked in the last 5 years YES__X__  NO____ (2024)  If no, please follow up with your primary care physician. You should have a cholesterol screening every 5 years at minimum, and every year if taking a medication for your cholesterol levels.  "    ____________________________________________________________________________________________________    Follow-up Plan:  Follow up with Dr Torres in 1 year with an echo prior. Follow up with EP in 1 year. We will plan on a cardiopulmonary stress testing and cardiac MRI in 2 years     ____________________________________________________________________________________________________    If you have questions or concerns, please call us at [phone] or contact us through [employer].    Pamela Alba RN, BSN    Antoinette Gutierrez (Scheduling)  Nurse Care Coordinator     Clinic   Adult Congenital and CV Genetics  Adult Congenital   [facility]  [facility]  (P) [phone]     (P) [phone]  (F) [phone]     (F) [phone]          For after hours urgent needs, call [phone] and ask to speak to the \"On-Call Cardiologist.\"    For emergencies call 911.    [facility]  [facility]  Mail Code [id number]  [address] [place] [address], [place]  [address]    "

## 2024-08-20 NOTE — PROGRESS NOTES
[facility] ELECTROPHYSIOLOGY CLINIC VISIT    Assessment/Recommendations   Assessment/Plan:    Mr. Chen is a 43 year old male who has a past medical history significant for dTGA s/p modified Shoemaker operation and stitch closure of small VSD , AFL s/p DCCV and s/p ablation 2000, reduced systemic heart function, s/p ICD 3/7/23, liver and kidney transplant , post transplant lymphoproliferative disorder, papillary thyroid cancer s/p thyroidectomy, prior ETOH abuse (sober since ), anxiety, and depression. He presents today for follow up.     Atrial Flutter:   We discussed in detail with the patient management/treatment options for AFL includin. Stroke Prophylaxis:  CHADSVASC=+HF, +HTN  2, corresponding to a 2.2% annual stroke / systemic emolism event rate. indicating need for long term oral anticoagulation. He also has congenital anatomy which we would recommend anticoagulation. He is appropriately on Eliquis. No bleeding isues.    2. Rate Control: Continue Metoprolol XL 25 mg daily.   3. Rhythm Control: Cardioversion, Antiarrhythmics and/or ablation are options for rhythm control. He has previously been on Sotalol. Not currently on AATs. S/p DCCV 3/7/18  with history of Ablation at OSH in . Organized A.flutter is highly amenable to ablation procedure. An ablation can be considered for any recurrence.   4. Risk Factor Management: maintain tight BP control, and JUAN evaluation.       dTGA systemic EF 27%, NYHA I-II:  1. ACEi/ARB: Continue Lisiopril.  2. BB: Continue Metoprolol XL 25 mg daily.   3. Aldosterone antagonist: not required.  4. SCD prophylaxis: discussed that systemic EF read 27% and is still low despite medical therapy, and relatively unchanged visually from prior imaging. We discussed an ICD both with patient and Dr [facility] We discussed S-ICD versus TV-ICD. He elected to pursue TV ICD which was implanted 3/2023. Normal device function. Continue routine  device clinic follow-up.   5. Fluid status: euvolemic on exam     Follow up on an annual basis.        History of Present Illness/Subjective    Mr. Cricket Chen is a 43 year old male who comes in today for EP follow-up of [facility] dTGA and AFL.    Mr. Chen is a 43 year old male who has a past medical history significant for dTGA s/p modified Shoemaker operation and stitch closure of small VSD 1981, AFL s/p DCCV and s/p ablation 8/2000, reduced systemic heart function, s/p ICD 3/7/23, liver and kidney transplant 2016, post transplant lymphoproliferative disorder, papillary thyroid cancer s/p thyroidectomy, prior ETOH abuse (sober since 2014), anxiety, and depression. He presents today for follow up.      He presented to [facility] on 3/6/18 with shortness of breath and chest flutterings that started the day prior. He recalled it felt similar to when he was in AFL in the past. In the [facility], he was found to be in AFL. He was subsequently admitted. He was started on Eliquis and arranged for NEELIMA/DCCV. He had successful DCCV 3/7/18 and he was discharged. He states that he had AFL in the past. He believes he had a DCCV around age 7. He had also been maintained on Sotalol. He also reports a previous ablation at an OSH in 8/2000 (records not available to us at this time). He did well without AFL issues until 3/2018 when he had recurrence. Most recent echo from 3/30/18 showed systemic EF 35-40%, mild TR, and no evidence of baffle leaks/obstructions. A CMRI from OSH from 3/2017 showed no baffle  Obstruction and systemic EF 33%. He saw [facility] EP clinic on 4/13/2018. He was doing well. He had not noted any further arrhythmias since his DCCV. He followed up in EP clinic in 7/2018 and was doing well without recurrent AFL.      EP Visit 3/2019: He presents today for follow up. He presented to [facility] on 1/16/19 reporting some shortness of breath and palpitations. He was in sinus on presentation to ER. He was discharged on a zio patch  monitor. Zio patch from 1/17/19-1/31/19 showed SR with rare ectopy and 7 NSVT up to 10 beats. 3 symptom activations showed sinus. An echo from 1/2019 showed moderately depressed Rv function, no obvious baffle obstructions or leaks. He reports feeling well and no recurrent palpitations. He denies any chest pain/pressures, dizziness, lightheadedness, worsening shortness of breath, leg/ankle swelling, PND, orthopnea, palpitations, or syncopal symptoms. Presenting 12 lead ECG shows SB Vent Rate 59 bpm,  ms,  ms, QTc 429 ms. Current cardiac medications include: Toprol XL, Eliquis, Lisinopril, and Norvasc.      EP Visit 3/13/20: He presents today for follow up. He reports feeling well. He denies any chest pain/pressures, dizziness, lightheadedness, worsening shortness of breath, leg/ankle swelling, PND, orthopnea, palpitations, or syncopal symptoms. Recent CMRI/MRA showed systemic EF 37%, normal baffles without obstruction, fatty metaplasia of sub-pulmonic ventricle and narrowing of intra-hepatic IVC which are unchanged. A zio patch from June 2019 showed SR with 7 NSVT episodes. Presenting 12 lead ECG shows NSR IRBBB Vent Rate 65 bpm,  ms,  ms, QTc 443 ms. Current cardiac medications include: Toprol XL, Eliquis, Lisinopril, and Norvasc.     EP Visit 11/23/21: He presents today for follow up. He reports feeling very well.  He is stable with no new symptoms, NYHA class II.  Echo today was reviewed and shows mod to sev systemic ventricle dysfunction but also considered no change from last echoA zio patch monitor from 8/16/21-8/30/21 showed 11 NSVT up to 10 beats and rare isolated ectopy with adequate HR distribution in sinus. Current cardiac medications include: Toprol XL, Eliquis, Lisinopril, and Norvasc.     EP Visit 11/25/22; He presents today for follow up. He reports feeling mild palpitations, not significant for patient, no presyncope, less than last year. functionally is very good, except if  he goes for jog or run that he has to stop, does 30 min walks every other day with no limitations  No bleeding issues with Eliquis.. Echo today shows Moderate to severe right ventricular dilatation with moderate to severely decreased systolic function (no significant change compared to last echocardiogram). Mild tricuspid valve insufficiency. The pulmonary venous baffle is unobstructed; the systemic venous baffle is unobstructed (mean gradient of 3 mmHg). Qualitatively, normal left ventricular systolic function. Mild pulmonary valve insufficiency. Presenting 12 lead ECG shows NSR with PVC Vent Rate 70 bpm,  ms,  ms, QTc 450 ms. Current cardiac medications include: Toprol XL, Eliquis, Lisinopril.     EP Visit 6/23/23: He presents today for follow up. He had ICD implanted on 3/7/23. Device site well healed. He reports feeling well. He denies chest discomfort, palpitations, abdominal fullness/bloating or peripheral edema, shortness of breath, paroxysmal nocturnal dyspnea, orthopnea, lightheadedness, dizziness, pre-syncope, or syncope. Device interrogation shows normal device function, stable lead parameters, 1 NSVT 4 seconds, 11 seconds total AT/AF, and  <0.1%. Current cardiac medications include: Toprol XL, Eliquis, Lisinopril. .     He presents today for follow up. He reports feeling well. He denies chest discomfort, palpitations, abdominal fullness/bloating or peripheral edema, shortness of breath, paroxysmal nocturnal dyspnea, orthopnea, lightheadedness, dizziness, pre-syncope, or syncope. Presenting 12 lead ECG shows SB RBBB LPFB Vent Rate 57 bpm,  ms,  ms, QTc 439 ms. Device interrogation shows normal device function, stable lead parameters, no arrhythmias recorded, and Ap 2.7%,  <0.1%. Current cardiac medications include: Toprol XL, Lisinopril, and Eliquis.        Cardiographics (Personally Reviewed) :   TTE 11/25/22:  Patient after atrial switch operation for complete transposition  of the great arteries. Technically difficult study due to poor acoustic windows. Moderate to severe right ventricular dilatation with moderate to severely decreased systolic function (no significant change compared to last echocardiogram). Mild tricuspid valve insufficiency. The pulmonary venous baffle is unobstructed; the systemic venous baffle is unobstructed (mean gradient of 3 mmHg). Qualitatively, normal left ventricular systolic function. Mild pulmonary valve insufficiency    TTE 11/23/2021:  Patient after atrial switch operation for complete transposition of the great arteries. Technically difficult study due to poor acoustic windows. Moderate right ventricular dilatation and hypertrophy with moderate to severely decreased systolic function (no significant change compared to last echocardiogram). Mild tricuspid valve insufficiency. The pulmonary venous baffle is unobstructed; the systmic venous baffle was not well seen.Qualitatively, normal left ventricular systolic function. Mild pulmonary valve insufficiency.    3/30/18 ECHOCARDIOGRAM:   Patient after atrial switch operation for complete transposition of the great  arteries. Technically difficult study due to poor acoustic windows. No baffle  obstruction or leaks seen. There is moderate right ventricular enlargement.  Single plane right ventricular EF 35-40 %. Normal left ventricular systolic  function. No outflow obstruction. Mild tricuspid regurgitation.     3/2017 CMRI (OS REPORT):  1. Transposition of the great arteries with native atrioventricular concordance and ventricular arterial discordance with the aorta anterior to the pulmonary artery (D-transposition of the great arteries).  2. Status post interatrial baffle with the superior vena cava and inferior vena cava baffled to the left (subpulmonic) ventricle without obstruction.  There is no evidence of a baffle leak.    3. Systemic right ventricle:  a. The right ventricle is hypertrophied.  b.  Decreased systolic function with an ejection fraction of 33%.  c. Severe dilation of the right ventricle with an end-diastolic volume of 208 mL/m  (previously 188 mL/m ) and end-systolic volume of 140 mL/m  (previous 123 mL/m ).  4. The right ventricle connects with the anterior aorta.  The coronary artery origins are usual for transposition.  Left-sided aortic arch with measurements above.  5. The pulmonary venous baffle to the right ventricle is widely patent.  6. The subpulmonary left ventricle has normal systolic function with decreased myocardial mass.  The left ventricle connects to the pulmonary artery, which is posterior to the aorta.  7. No significant change from previous cardiac MRI of 9/4/2012 except an increased size in the indexed right ventricular diastolic and systolic Volumes.    3/4/2020 CMRIMRA:  CONCLUSIONS: d-TGA with atrial baffle repair.  The systemic ventricle is moderately enlarged with severely reduced function, EF 27%. The subpulmonic ventricle is small in size with normal function, EF 57%. Normal baffles without any obstruction, thrombus or leakage. There is mild-moderate regurgitation of the systemic atrioventricular (tricuspid) valve. There is no residual intracardiac shunting (Qp/Qs 1.0).   The subpulmonic ventricle exhibits extensive fatty metaplasia in the mid and apical segments of unclear significance. Narrowing of intra-hepatic IVC of unclear significance.  Compared to the prior examination dated 03/09/2017 the systemic ventricle's function appears similar visually. The fatty metaplasia of sub-pulmonic ventricle and narrowing of intra-hepatic IVC  is unchanged from prior examinations.     1/2019 ECHO:  ------CONCLUSIONS------  Patient after atrial switch operation for complete transposition of the great arteries. Technically difficult study due to poor acoustic windows. There is moderate right ventricular enlargement. The right ventricular function is qualitatively moderately  depressed. Qualitivley the right ventricle function is unchanged form the 3/30/2018 echo. Mild (1+) tricuspid valve insufficiency.No obvious baffle obstruction or leaks seen. Qualitatively normal left ventricular systolic function. Mild (1+) pulmonary valve insufficiency.Limited visualization of the LPA suggests narrowing. RPA not seen.       Physical Examination   /81   Pulse 60   Wt 93.3 kg (205 lb 9.6 oz)   SpO2 97%   BMI 29.71 kg/m    Wt Readings from Last 3 Encounters:   12/15/23 95.1 kg (209 lb 9.6 oz)   10/10/23 95.8 kg (211 lb 3.2 oz)   06/23/23 88.2 kg (194 lb 8 oz)     General Appearance:   Alert, well-appearing and in no acute distress.   HEENT: Atraumatic, normocephalic. PERRL.  MMM.   Chest/Lungs:   Respirations unlabored.  Lungs are clear to auscultation.   Cardiovascular:   Regular rate and rhythm.    Abdomen:  Soft, nontender, nondistended.   Extremities: No cyanosis or clubbing. No edema.    Musculoskeletal: Moves all extremities.  Gait normal.   Skin: Warm, dry, intact.    Neurologic: Mood and affect are appropriate.  Alert and oriented to person, place, time, and situation.          Medications  Allergies   Current Outpatient Medications   Medication Sig Dispense Refill    ELIQUIS ANTICOAGULANT 5 MG tablet TAKE 1 TABLET (5 MG) BY MOUTH 2 TIMES DAILY 180 tablet 1    fluocinonide (LIDEX) 0.05 % external solution Apply sparingly twice daily to scalp  as needed. 60 mL 5    ketoconazole (NIZORAL) 2 % external cream Apply daily to affected area on face. 30 g 7    ketoconazole (NIZORAL) 2 % external shampoo Leave on scalp for 5 minutes then rinse. Use 2-3 time weekly. 120 mL 6    levothyroxine (SYNTHROID/LEVOTHROID) 137 MCG tablet TAKE ONE TABLET BY MOUTH ONCE DAILY 90 tablet 1    lisinopril (ZESTRIL) 20 MG tablet TAKE ONE TABLET BY MOUTH ONCE DAILY 90 tablet 3    LORazepam (ATIVAN) 1 MG tablet Take 1 tablet (1 mg) by mouth daily as needed (severe anxiety/panic/sleep) 10 tablet 0    magnesium  oxide (MAG-OX) 400 MG tablet TAKE 1 TABLET BY MOUTH TWICE A  tablet 11    metoprolol succinate ER (TOPROL XL) 25 MG 24 hr tablet TAKE 2 TABLETS (50 MG) BY MOUTH EVERY MORNING AND 1 TABLET (25 MG) EVERY EVENING. 270 tablet 0    mycophenolate (GENERIC EQUIVALENT) 250 MG capsule Take 2 capsules (500 mg) by mouth 2 times daily 120 capsule 11    sulfamethoxazole-trimethoprim (BACTRIM) 400-80 MG tablet TAKE ONE TABLET BY MOUTH ONCE DAILY 30 tablet 11    tacrolimus (GENERIC) 0.5 MG capsule TAKE ONE CAPSULE BY MOUTH EVERY MORNING (TOTAL DOSE IS 1.5MG EVERY MORNING AND 1MG EVERY EVENING) 90 capsule 3    tacrolimus (GENERIC) 1 MG capsule TAKE ONE CAPSULE BY MOUTH TWICE A DAY TOTAL DOSE 1.5MG IN THE MORNING AND 1MG IN THE EVENING 180 capsule 3    tacrolimus (PROTOPIC) 0.1 % external ointment Apply sparingly daily to face then reduce in 2-3 time weekly. 30 g 5    traZODone (DESYREL) 50 MG tablet Take 1 tablet (50 mg) by mouth at bedtime as needed, may repeat once for sleep 180 tablet 0    Allergies   Allergen Reactions    Hydromorphone Itching         Lab Results (Personally Reviewed)    Chemistry/lipid CBC Cardiac Enzymes/BNP/TSH/INR   Lab Results   Component Value Date    BUN 20.4 (H) 07/10/2024     07/10/2024    CO2 23 07/10/2024     Creatinine   Date Value Ref Range Status   07/10/2024 1.40 (H) 0.67 - 1.17 mg/dL Final   07/06/2021 1.27 (H) 0.66 - 1.25 mg/dL Final       Lab Results   Component Value Date    CHOL 196 07/10/2024    HDL 36 (L) 07/10/2024     (H) 07/10/2024      Lab Results   Component Value Date    WBC 5.4 07/10/2024    HGB 15.4 07/10/2024    HCT 46.0 07/10/2024    MCV 92 07/10/2024     (L) 07/10/2024    Lab Results   Component Value Date    TSH 1.08 02/06/2024    INR 1.13 10/30/2019        The patient states understanding and is agreeable with the plan.   DARYN Rios CNP  Electrophysiology Consult Service  Securely message with Vocera   Text page via [facility] Paging/Directory

## 2024-08-21 DIAGNOSIS — Z94.4 LIVER REPLACED BY TRANSPLANT (H): Primary | ICD-10-CM

## 2024-08-23 ENCOUNTER — ANCILLARY PROCEDURE (OUTPATIENT)
Dept: CARDIOLOGY | Facility: CLINIC | Age: 44
End: 2024-08-23
Payer: COMMERCIAL

## 2024-08-23 ENCOUNTER — OFFICE VISIT (OUTPATIENT)
Dept: CARDIOLOGY | Facility: CLINIC | Age: 44
End: 2024-08-23
Attending: NURSE PRACTITIONER
Payer: COMMERCIAL

## 2024-08-23 ENCOUNTER — OFFICE VISIT (OUTPATIENT)
Dept: CARDIOLOGY | Facility: CLINIC | Age: 44
End: 2024-08-23
Payer: COMMERCIAL

## 2024-08-23 ENCOUNTER — ANCILLARY PROCEDURE (OUTPATIENT)
Dept: CARDIOLOGY | Facility: CLINIC | Age: 44
End: 2024-08-23
Attending: INTERNAL MEDICINE
Payer: COMMERCIAL

## 2024-08-23 VITALS
OXYGEN SATURATION: 97 % | HEART RATE: 60 BPM | WEIGHT: 205.6 LBS | SYSTOLIC BLOOD PRESSURE: 129 MMHG | BODY MASS INDEX: 29.71 KG/M2 | DIASTOLIC BLOOD PRESSURE: 81 MMHG

## 2024-08-23 VITALS
SYSTOLIC BLOOD PRESSURE: 129 MMHG | DIASTOLIC BLOOD PRESSURE: 81 MMHG | BODY MASS INDEX: 29.71 KG/M2 | OXYGEN SATURATION: 97 % | WEIGHT: 205.6 LBS | HEART RATE: 60 BPM

## 2024-08-23 DIAGNOSIS — I48.92 ATRIAL FLUTTER, UNSPECIFIED TYPE (H): ICD-10-CM

## 2024-08-23 DIAGNOSIS — Z94.0 KIDNEY TRANSPLANTED: ICD-10-CM

## 2024-08-23 DIAGNOSIS — Q24.9 ADULT CONGENITAL HEART DISEASE: Primary | ICD-10-CM

## 2024-08-23 DIAGNOSIS — Q20.3 D-TGA (DEXTRO-TRANSPOSITION OF GREAT ARTERIES): ICD-10-CM

## 2024-08-23 DIAGNOSIS — I50.23: ICD-10-CM

## 2024-08-23 DIAGNOSIS — I42.8 NICM (NONISCHEMIC CARDIOMYOPATHY) (H): ICD-10-CM

## 2024-08-23 DIAGNOSIS — Q20.3 COMPLETE TRANSPOSITION OF GREAT VESSELS: ICD-10-CM

## 2024-08-23 PROCEDURE — 93320 DOPPLER ECHO COMPLETE: CPT | Performed by: PEDIATRICS

## 2024-08-23 PROCEDURE — G0463 HOSPITAL OUTPT CLINIC VISIT: HCPCS

## 2024-08-23 PROCEDURE — 93005 ELECTROCARDIOGRAM TRACING: CPT

## 2024-08-23 PROCEDURE — 93010 ELECTROCARDIOGRAM REPORT: CPT | Performed by: INTERNAL MEDICINE

## 2024-08-23 PROCEDURE — 99214 OFFICE O/P EST MOD 30 MIN: CPT | Mod: 25

## 2024-08-23 PROCEDURE — 99214 OFFICE O/P EST MOD 30 MIN: CPT | Mod: 25 | Performed by: NURSE PRACTITIONER

## 2024-08-23 PROCEDURE — 93325 DOPPLER ECHO COLOR FLOW MAPG: CPT | Performed by: PEDIATRICS

## 2024-08-23 PROCEDURE — 93283 PRGRMG EVAL IMPLANTABLE DFB: CPT | Performed by: INTERNAL MEDICINE

## 2024-08-23 PROCEDURE — 93303 ECHO TRANSTHORACIC: CPT | Performed by: PEDIATRICS

## 2024-08-23 ASSESSMENT — PAIN SCALES - GENERAL
PAINLEVEL: NO PAIN (0)
PAINLEVEL: NO PAIN (0)

## 2024-08-23 NOTE — LETTER
2024      RE: Cricket Chen  [address]  [facility] [address]       Dear Colleague,    Thank you for the opportunity to participate in the care of your patient, Cricket Chen, at the [facility] [place] [facility]. Please see a copy of my visit note below.        [facility] ELECTROPHYSIOLOGY CLINIC VISIT    Assessment/Recommendations   Assessment/Plan:    Mr. Chen is a 43 year old male who has a past medical history significant for dTGA s/p modified Shoemaker operation and stitch closure of small VSD , AFL s/p DCCV and s/p ablation 2000, reduced systemic heart function, s/p ICD 3/7/23, liver and kidney transplant , post transplant lymphoproliferative disorder, papillary thyroid cancer s/p thyroidectomy, prior ETOH abuse (sober since ), anxiety, and depression. He presents today for follow up.     Atrial Flutter:   We discussed in detail with the patient management/treatment options for AFL includin. Stroke Prophylaxis:  CHADSVASC=+HF, +HTN  2, corresponding to a 2.2% annual stroke / systemic emolism event rate. indicating need for long term oral anticoagulation. He also has congenital anatomy which we would recommend anticoagulation. He is appropriately on Eliquis. No bleeding isues.    2. Rate Control: Continue Metoprolol XL 25 mg daily.   3. Rhythm Control: Cardioversion, Antiarrhythmics and/or ablation are options for rhythm control. He has previously been on Sotalol. Not currently on AATs. S/p DCCV 3/7/18  with history of Ablation at OSH in . Organized A.flutter is highly amenable to ablation procedure. An ablation can be considered for any recurrence.   4. Risk Factor Management: maintain tight BP control, and JUAN evaluation.       dTGA systemic EF 27%, NYHA I-II:  1. ACEi/ARB: Continue Lisiopril.  2. BB: Continue Metoprolol XL 25 mg daily.   3. Aldosterone antagonist: not required.  4. SCD  prophylaxis: discussed that systemic EF read 27% and is still low despite medical therapy, and relatively unchanged visually from prior imaging. We discussed an ICD both with patient and Dr Torres who is his primary [facility] physician. We discussed S-ICD versus TV-ICD. He elected to pursue TV ICD which was implanted 3/2023. Normal device function. Continue routine device clinic follow-up.   5. Fluid status: euvolemic on exam     Follow up on an annual basis.        History of Present Illness/Subjective    Mr. Cricket Chen is a 43 year old male who comes in today for EP follow-up of [facility] dTGA and AFL.    Mr. Chen is a 43 year old male who has a past medical history significant for dTGA s/p modified Shoemaker operation and stitch closure of small VSD 1981, AFL s/p DCCV and s/p ablation 8/2000, reduced systemic heart function, s/p ICD 3/7/23, liver and kidney transplant 2016, post transplant lymphoproliferative disorder, papillary thyroid cancer s/p thyroidectomy, prior ETOH abuse (sober since 2014), anxiety, and depression. He presents today for follow up.      He presented to [facility] on 3/6/18 with shortness of breath and chest flutterings that started the day prior. He recalled it felt similar to when he was in AFL in the past. In the [facility], he was found to be in AFL. He was subsequently admitted. He was started on Eliquis and arranged for NEELIMA/DCCV. He had successful DCCV 3/7/18 and he was discharged. He states that he had AFL in the past. He believes he had a DCCV around age 7. He had also been maintained on Sotalol. He also reports a previous ablation at an OSH in 8/2000 (records not available to us at this time). He did well without AFL issues until 3/2018 when he had recurrence. Most recent echo from 3/30/18 showed systemic EF 35-40%, mild TR, and no evidence of baffle leaks/obstructions. A CMRI from OSH from 3/2017 showed no baffle  Obstruction and systemic EF 33%. He saw [facility] EP clinic on 4/13/2018. He was doing  well. He had not noted any further arrhythmias since his DCCV. He followed up in EP clinic in 7/2018 and was doing well without recurrent AFL.      EP Visit 3/2019: He presents today for follow up. He presented to [facility] on 1/16/19 reporting some shortness of breath and palpitations. He was in sinus on presentation to ER. He was discharged on a zio patch monitor. Zio patch from 1/17/19-1/31/19 showed SR with rare ectopy and 7 NSVT up to 10 beats. 3 symptom activations showed sinus. An echo from 1/2019 showed moderately depressed Rv function, no obvious baffle obstructions or leaks. He reports feeling well and no recurrent palpitations. He denies any chest pain/pressures, dizziness, lightheadedness, worsening shortness of breath, leg/ankle swelling, PND, orthopnea, palpitations, or syncopal symptoms. Presenting 12 lead ECG shows SB Vent Rate 59 bpm,  ms,  ms, QTc 429 ms. Current cardiac medications include: Toprol XL, Eliquis, Lisinopril, and Norvasc.      EP Visit 3/13/20: He presents today for follow up. He reports feeling well. He denies any chest pain/pressures, dizziness, lightheadedness, worsening shortness of breath, leg/ankle swelling, PND, orthopnea, palpitations, or syncopal symptoms. Recent CMRI/MRA showed systemic EF 37%, normal baffles without obstruction, fatty metaplasia of sub-pulmonic ventricle and narrowing of intra-hepatic IVC which are unchanged. A zio patch from June 2019 showed SR with 7 NSVT episodes. Presenting 12 lead ECG shows NSR IRBBB Vent Rate 65 bpm,  ms,  ms, QTc 443 ms. Current cardiac medications include: Toprol XL, Eliquis, Lisinopril, and Norvasc.     EP Visit 11/23/21: He presents today for follow up. He reports feeling very well.  He is stable with no new symptoms, NYHA class II.  Echo today was reviewed and shows mod to sev systemic ventricle dysfunction but also considered no change from last echoA zio patch monitor from 8/16/21-8/30/21 showed 11 NSVT up  to 10 beats and rare isolated ectopy with adequate HR distribution in sinus. Current cardiac medications include: Toprol XL, Eliquis, Lisinopril, and Norvasc.     EP Visit 11/25/22; He presents today for follow up. He reports feeling mild palpitations, not significant for patient, no presyncope, less than last year. functionally is very good, except if he goes for jog or run that he has to stop, does 30 min walks every other day with no limitations  No bleeding issues with Eliquis.. Echo today shows Moderate to severe right ventricular dilatation with moderate to severely decreased systolic function (no significant change compared to last echocardiogram). Mild tricuspid valve insufficiency. The pulmonary venous baffle is unobstructed; the systemic venous baffle is unobstructed (mean gradient of 3 mmHg). Qualitatively, normal left ventricular systolic function. Mild pulmonary valve insufficiency. Presenting 12 lead ECG shows NSR with PVC Vent Rate 70 bpm,  ms,  ms, QTc 450 ms. Current cardiac medications include: Toprol XL, Eliquis, Lisinopril.     EP Visit 6/23/23: He presents today for follow up. He had ICD implanted on 3/7/23. Device site well healed. He reports feeling well. He denies chest discomfort, palpitations, abdominal fullness/bloating or peripheral edema, shortness of breath, paroxysmal nocturnal dyspnea, orthopnea, lightheadedness, dizziness, pre-syncope, or syncope. Device interrogation shows normal device function, stable lead parameters, 1 NSVT 4 seconds, 11 seconds total AT/AF, and  <0.1%. Current cardiac medications include: Toprol XL, Eliquis, Lisinopril. .     He presents today for follow up. He reports feeling well. He denies chest discomfort, palpitations, abdominal fullness/bloating or peripheral edema, shortness of breath, paroxysmal nocturnal dyspnea, orthopnea, lightheadedness, dizziness, pre-syncope, or syncope. Presenting 12 lead ECG shows SB RBBB LPFB Vent Rate 57 bpm, MS  196 ms,  ms, QTc 439 ms. Device interrogation shows normal device function, stable lead parameters, no arrhythmias recorded, and Ap 2.7%,  <0.1%. Current cardiac medications include: Toprol XL, Lisinopril, and Eliquis.        Cardiographics (Personally Reviewed) :   TTE 11/25/22:  Patient after atrial switch operation for complete transposition of the great arteries. Technically difficult study due to poor acoustic windows. Moderate to severe right ventricular dilatation with moderate to severely decreased systolic function (no significant change compared to last echocardiogram). Mild tricuspid valve insufficiency. The pulmonary venous baffle is unobstructed; the systemic venous baffle is unobstructed (mean gradient of 3 mmHg). Qualitatively, normal left ventricular systolic function. Mild pulmonary valve insufficiency    TTE 11/23/2021:  Patient after atrial switch operation for complete transposition of the great arteries. Technically difficult study due to poor acoustic windows. Moderate right ventricular dilatation and hypertrophy with moderate to severely decreased systolic function (no significant change compared to last echocardiogram). Mild tricuspid valve insufficiency. The pulmonary venous baffle is unobstructed; the systmic venous baffle was not well seen.Qualitatively, normal left ventricular systolic function. Mild pulmonary valve insufficiency.    3/30/18 ECHOCARDIOGRAM:   Patient after atrial switch operation for complete transposition of the great  arteries. Technically difficult study due to poor acoustic windows. No baffle  obstruction or leaks seen. There is moderate right ventricular enlargement.  Single plane right ventricular EF 35-40 %. Normal left ventricular systolic  function. No outflow obstruction. Mild tricuspid regurgitation.     3/2017 CMRI (OSH REPORT):  1. Transposition of the great arteries with native atrioventricular concordance and ventricular arterial discordance with  the aorta anterior to the pulmonary artery (D-transposition of the great arteries).  2. Status post interatrial baffle with the superior vena cava and inferior vena cava baffled to the left (subpulmonic) ventricle without obstruction.  There is no evidence of a baffle leak.    3. Systemic right ventricle:  a. The right ventricle is hypertrophied.  b. Decreased systolic function with an ejection fraction of 33%.  c. Severe dilation of the right ventricle with an end-diastolic volume of 208 mL/m  (previously 188 mL/m ) and end-systolic volume of 140 mL/m  (previous 123 mL/m ).  4. The right ventricle connects with the anterior aorta.  The coronary artery origins are usual for transposition.  Left-sided aortic arch with measurements above.  5. The pulmonary venous baffle to the right ventricle is widely patent.  6. The subpulmonary left ventricle has normal systolic function with decreased myocardial mass.  The left ventricle connects to the pulmonary artery, which is posterior to the aorta.  7. No significant change from previous cardiac MRI of 9/4/2012 except an increased size in the indexed right ventricular diastolic and systolic Volumes.    3/4/2020 CMRIMRA:  CONCLUSIONS: d-TGA with atrial baffle repair.  The systemic ventricle is moderately enlarged with severely reduced function, EF 27%. The subpulmonic ventricle is small in size with normal function, EF 57%. Normal baffles without any obstruction, thrombus or leakage. There is mild-moderate regurgitation of the systemic atrioventricular (tricuspid) valve. There is no residual intracardiac shunting (Qp/Qs 1.0).   The subpulmonic ventricle exhibits extensive fatty metaplasia in the mid and apical segments of unclear significance. Narrowing of intra-hepatic IVC of unclear significance.  Compared to the prior examination dated 03/09/2017 the systemic ventricle's function appears similar visually. The fatty metaplasia of sub-pulmonic ventricle and narrowing of  intra-hepatic IVC  is unchanged from prior examinations.     1/2019 ECHO:  ------CONCLUSIONS------  Patient after atrial switch operation for complete transposition of the great arteries. Technically difficult study due to poor acoustic windows. There is moderate right ventricular enlargement. The right ventricular function is qualitatively moderately depressed. Qualitivley the right ventricle function is unchanged form the 3/30/2018 echo. Mild (1+) tricuspid valve insufficiency.No obvious baffle obstruction or leaks seen. Qualitatively normal left ventricular systolic function. Mild (1+) pulmonary valve insufficiency.Limited visualization of the LPA suggests narrowing. RPA not seen.       Physical Examination   /81   Pulse 60   Wt 93.3 kg (205 lb 9.6 oz)   SpO2 97%   BMI 29.71 kg/m    Wt Readings from Last 3 Encounters:   12/15/23 95.1 kg (209 lb 9.6 oz)   10/10/23 95.8 kg (211 lb 3.2 oz)   06/23/23 88.2 kg (194 lb 8 oz)     General Appearance:   Alert, well-appearing and in no acute distress.   HEENT: Atraumatic, normocephalic. PERRL.  MMM.   Chest/Lungs:   Respirations unlabored.  Lungs are clear to auscultation.   Cardiovascular:   Regular rate and rhythm.    Abdomen:  Soft, nontender, nondistended.   Extremities: No cyanosis or clubbing. No edema.    Musculoskeletal: Moves all extremities.  Gait normal.   Skin: Warm, dry, intact.    Neurologic: Mood and affect are appropriate.  Alert and oriented to person, place, time, and situation.          Medications  Allergies   Current Outpatient Medications   Medication Sig Dispense Refill     ELIQUIS ANTICOAGULANT 5 MG tablet TAKE 1 TABLET (5 MG) BY MOUTH 2 TIMES DAILY 180 tablet 1     fluocinonide (LIDEX) 0.05 % external solution Apply sparingly twice daily to scalp  as needed. 60 mL 5     ketoconazole (NIZORAL) 2 % external cream Apply daily to affected area on face. 30 g 7     ketoconazole (NIZORAL) 2 % external shampoo Leave on scalp for 5 minutes then  rinse. Use 2-3 time weekly. 120 mL 6     levothyroxine (SYNTHROID/LEVOTHROID) 137 MCG tablet TAKE ONE TABLET BY MOUTH ONCE DAILY 90 tablet 1     lisinopril (ZESTRIL) 20 MG tablet TAKE ONE TABLET BY MOUTH ONCE DAILY 90 tablet 3     LORazepam (ATIVAN) 1 MG tablet Take 1 tablet (1 mg) by mouth daily as needed (severe anxiety/panic/sleep) 10 tablet 0     magnesium oxide (MAG-OX) 400 MG tablet TAKE 1 TABLET BY MOUTH TWICE A  tablet 11     metoprolol succinate ER (TOPROL XL) 25 MG 24 hr tablet TAKE 2 TABLETS (50 MG) BY MOUTH EVERY MORNING AND 1 TABLET (25 MG) EVERY EVENING. 270 tablet 0     mycophenolate (GENERIC EQUIVALENT) 250 MG capsule Take 2 capsules (500 mg) by mouth 2 times daily 120 capsule 11     sulfamethoxazole-trimethoprim (BACTRIM) 400-80 MG tablet TAKE ONE TABLET BY MOUTH ONCE DAILY 30 tablet 11     tacrolimus (GENERIC) 0.5 MG capsule TAKE ONE CAPSULE BY MOUTH EVERY MORNING (TOTAL DOSE IS 1.5MG EVERY MORNING AND 1MG EVERY EVENING) 90 capsule 3     tacrolimus (GENERIC) 1 MG capsule TAKE ONE CAPSULE BY MOUTH TWICE A DAY TOTAL DOSE 1.5MG IN THE MORNING AND 1MG IN THE EVENING 180 capsule 3     tacrolimus (PROTOPIC) 0.1 % external ointment Apply sparingly daily to face then reduce in 2-3 time weekly. 30 g 5     traZODone (DESYREL) 50 MG tablet Take 1 tablet (50 mg) by mouth at bedtime as needed, may repeat once for sleep 180 tablet 0    Allergies   Allergen Reactions     Hydromorphone Itching         Lab Results (Personally Reviewed)    Chemistry/lipid CBC Cardiac Enzymes/BNP/TSH/INR   Lab Results   Component Value Date    BUN 20.4 (H) 07/10/2024     07/10/2024    CO2 23 07/10/2024     Creatinine   Date Value Ref Range Status   07/10/2024 1.40 (H) 0.67 - 1.17 mg/dL Final   07/06/2021 1.27 (H) 0.66 - 1.25 mg/dL Final       Lab Results   Component Value Date    CHOL 196 07/10/2024    HDL 36 (L) 07/10/2024     (H) 07/10/2024      Lab Results   Component Value Date    WBC 5.4 07/10/2024    HGB  15.4 07/10/2024    HCT 46.0 07/10/2024    MCV 92 07/10/2024     (L) 07/10/2024    Lab Results   Component Value Date    TSH 1.08 02/06/2024    INR 1.13 10/30/2019        The patient states understanding and is agreeable with the plan.   DARYN Rios CNP  Electrophysiology Consult Service  Securely message with [employer]   Text page via [facility] Paging/Directory           Please do not hesitate to contact me if you have any questions/concerns.     Sincerely,     DARYN Castillo CNP

## 2024-08-23 NOTE — PATIENT INSTRUCTIONS
Thank you for visiting the Adult Congenital and Cardiovascular Genetics Clinic at the [facility].    Cardiology Providers you saw during your visit:  Rah Torres MD and DARYN Rios    Diagnosis:  D-TGA    Results:  aRh Torres MD and DARYN Rios reviewed the results of your labs, ECHO, EKG and device check testing today in clinic.    If you have questions or concerns, please call us at [phone] or contact us through MyChart.  ______________________________________________________________________________    Recommendations from your Cardiology Provider:    Have labs completed for Dr Torres while you have your lab draw on 9/4      General Cardiac Recommendations:  Continue to eat a heart healthy, low salt diet.  Continue to get 20-30 minutes of aerobic activity, 4-5 days per week.  Examples of aerobic activity include walking, running, swimming, cycling, etc.  Continue to observe good oral hygiene, with regular dental visits.    ____________________________________________________________________________________________________    SBE prophylaxis:   Yes____  No__X__     SBE prophylaxis applies to patients with certain heart conditions who are recommended to take antibiotics before dental appointments and other specific procedures. These antibiotics are to help prevent an infection of the heart (endocarditis) that certain patients are at higher risk of developing. The guidelines used come from the American Heart Association and are periodically updated.    ____________________________________________________________________________________________________    FASTING CHOLESTEROL was checked in the last 5 years YES__X__  NO____ (2024)  If no, please follow up with your primary care physician. You should have a cholesterol screening every 5 years at minimum, and every year if taking a medication for your cholesterol levels.  "    ____________________________________________________________________________________________________    Follow-up Plan:  Follow up with Dr Torres in 1 year with an echo prior. Follow up with EP in 1 year. We will plan on a cardiopulmonary stress testing and cardiac MRI in 2 years     ____________________________________________________________________________________________________    If you have questions or concerns, please call us at [phone] or contact us through [employer].    Pamela Alba RN, BSN    Antoinette Gutierrez (Scheduling)  Nurse Care Coordinator     Clinic   Adult Congenital and CV Genetics  Adult Congenital   [facility]  [facility]  (P) [phone]     (P) [phone]  (F) [phone]     (F) [phone]          For after hours urgent needs, call [phone] and ask to speak to the \"On-Call Cardiologist.\"    For emergencies call 911.    [facility]  [facility]  Mail Code [id number]  [address] [place] [address], [place]  [address]    "

## 2024-08-23 NOTE — LETTER
8/23/2024      RE: Cricket Chen  [address]  [facility] [address]       Dear Colleague,    Thank you for the opportunity to participate in the care of your patient, Cricket Chen, at the [facility] [place] at [facility]. Please see a copy of my visit note below.    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 IS seeing Dr. George from  today as well.     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 He has had atrial tachycardia with cardioversion in 2018 and ablation in 2000.  He has reduced systemic function.  s/p liver and kidney transplant for EtOH abuse in 2016, post-transplant lymphoproliferative disorder, papillary thyroid cancer s/p thryoidectomy clear at 5 years who presents today for ongoing evaluation and management.  Pt reports that he has had no significant illnesses or hospitalizations since his last visit. He remains very active, walking and training new dog. No shortness of breath He denies any chest pain orthopnea, pnd, syncope/presyncope, or suzette. Slight  Decrease in exercise tolerance over time.   He denies any problems with his medications. Sober for many years.     No recent hospitalizations illnesses or surgery. Father passed away suddenly this year at age 78. Mother undergoing treatment for breast cancer. Due for labs September 4th       PMH: Cardiac Hx as above  Past Medical History:   Diagnosis Date     Alcohol abuse     Last drink in Mid-April 2014     Anemia in ESRD (end-stage renal disease) (H)      Anxiety 2008     Atrial flutter (H) 2017     Cirrhosis (H)     S/P liver  transplant     Depression      History of blood transfusion      History of transposition of great vessels     atrial switch at age 8 months old     Hypertension      Liver transplant recipient (H)     2016     Papillary thyroid carcinoma (H)      Pneumonia 11-15-14     Renal transplant recipient     2016     Varices, esophageal (H)        Past Surgical History:   Procedure Laterality Date     ANESTHESIA CARDIOVERSION N/A 3/7/2018    Procedure: ANESTHESIA CARDIOVERSION;  Cardioversion;  Surgeon: GENERIC ANESTHESIA PROVIDER;  Location:  OR     BENCH LIVER N/A 5/10/2016    Procedure: BENCH LIVER;  Surgeon: Ricky Deshpande MD;  [facility]: [facility] OR     BIOPSY LYMPH NODE CERVICAL Right 1/26/2017    Procedure: BIOPSY LYMPH NODE CERVICAL;  Surgeon: Beka Soto MD;  [facility]     CARDIAC SURGERY  9-10-80     CREATE FISTULA ARTERIOVENOUS UPPER EXTREMITY Right 9/16/2014    Procedure: CREATE FISTULA ARTERIOVENOUS UPPER EXTREMITY;  Surgeon: Padmaja Eaton MD;  Location:  OR     CV RIGHT HEART CATH MEASUREMENTS RECORDED N/A 4/19/2019    Procedure: CV RIGHT HEART CATH;  Surgeon: Sabi Wiggins MD;  Location:  HEART CARDIAC CATH LAB     CYSTOSCOPY, REMOVE STENT(S), COMBINED Right 6/22/2016    Procedure: COMBINED CYSTOSCOPY, REMOVE STENT(S);  Surgeon: Ricky Deshpande MD;  [facility] OR     EMBOLECTOMY UPPER EXTREMITY Right 8/17/2018    Procedure: EMBOLECTOMY UPPER EXTREMITY;  Repair Right Upper Arm Pseudo Aneursym ;  Surgeon: John Payton MD;  Location:  OR     ENT SURGERY       EP INSERT ICD Left 2/28/2023    Procedure: Insert Implantable Cardioverter-Defibrillator (ICD);  Surgeon: Jaylen George MD;  Location:  HEART CARDIAC CATH LAB     ESOPHAGOSCOPY, GASTROSCOPY, DUODENOSCOPY (EGD), COMBINED  5/30/2014    Procedure: COMBINED ESOPHAGOSCOPY, GASTROSCOPY, DUODENOSCOPY (EGD);  Surgeon: Guillaume Bautista MD;  Location:  GI     ESOPHAGOSCOPY, GASTROSCOPY, DUODENOSCOPY (EGD), COMBINED   14     ESOPHAGOSCOPY, GASTROSCOPY, DUODENOSCOPY (EGD), COMBINED Left 3/12/2015    Procedure: COMBINED ESOPHAGOSCOPY, GASTROSCOPY, DUODENOSCOPY (EGD);  Surgeon: Laureen Galvan MD;  Location:  GI     ESOPHAGOSCOPY, GASTROSCOPY, DUODENOSCOPY (EGD), COMBINED N/A 2016    Procedure: COMBINED ESOPHAGOSCOPY, GASTROSCOPY, DUODENOSCOPY (EGD);  Surgeon: Laureen Galvan MD;  Location:  GI     ESOPHAGOSCOPY, GASTROSCOPY, DUODENOSCOPY (EGD), COMBINED N/A 2016    Procedure: COMBINED ESOPHAGOSCOPY, GASTROSCOPY, DUODENOSCOPY (EGD);  Surgeon: Laureen Galvan MD;  Location:  GI     HC OR CATH ABLATION NON-CARDIAC ENDOVASCULAR      SVT     INSERT PORT VASCULAR ACCESS N/A 4/3/2017    Procedure: INSERT PORT VASCULAR ACCESS;  Surgeon: Rajendra Jacques PA-C;  [facility]     IR PARACENTESIS  2014     IR PARACENTESIS  2014     IR PORT REMOVAL LEFT  2019     LIGATE FISTULA ARTERIOVENOUS UPPER EXTREMITY Right 2017    Procedure: LIGATE FISTULA ARTERIOVENOUS UPPER EXTREMITY;  Revision of Right Arm Arteriovenous Fistula ;  Surgeon: Padmaja Eaton MD;  [facility]     PERCUTANEOUS BIOPSY KIDNEY Right 2018    Procedure: PERCUTANEOUS BIOPSY KIDNEY;  Right Kidney Biopsy;  Surgeon: Yuval Khan MD;  [facility]     PERCUTANEOUS BIOPSY KIDNEY Right 10/30/2019    Procedure: Right Side Kidney Biopsy;  Surgeon: Jake Sidhu MD;  [facility]: [facility]     PICC INSERTION  14    PICC line placement 14; Removal 2014     REMOVE PORT VASCULAR ACCESS Right 2019    Procedure: Right chest Port Removal;  Surgeon: Erasmo Ziegler PA-C;  Location: [facility] OR     THORACIC SURGERY  1980    Transposition great arteries, repaired at 8 months     THYROIDECTOMY Right 2017    Procedure: THYROIDECTOMY;  Bilateral Total Thyroidectomy ;  Surgeon: Beka Soto MD;  [facility]: [facility]     TRANSPLANT KIDNEY RECIPIENT  DONOR   5/10/2016    Procedure: TRANSPLANT KIDNEY RECIPIENT  DONOR;  Surgeon: Ricky Deshpande MD;  [facility]: [facility]     TRANSPLANT LIVER RECIPIENT  DONOR N/A 5/10/2016    Procedure: TRANSPLANT LIVER RECIPIENT  DONOR;  Surgeon: Ricky Deshpande MD;  [facility] OR   As above, including HPI    Family History   Problem Relation Age of Onset     Hypertension Mother      Anxiety Disorder Mother      Hyperlipidemia Mother      Arthritis Father      Hypertension Father      Hypertension Sister      Anxiety Disorder Sister      No Known Problems Brother      No Known Problems Brother      No Known Problems Maternal Grandmother      No Known Problems Maternal Grandfather      No Known Problems Paternal Grandmother      No Known Problems Paternal Grandfather      Alcohol/Drug No family hx of      Gastrointestinal Disease No family hx of         no fam hx of liver disease or liver cancer       Meds  Current Outpatient Medications   Medication Sig Dispense Refill     ELIQUIS ANTICOAGULANT 5 MG tablet TAKE 1 TABLET (5 MG) BY MOUTH 2 TIMES DAILY 180 tablet 1     fluocinonide (LIDEX) 0.05 % external solution Apply sparingly twice daily to scalp  as needed. 60 mL 5     ketoconazole (NIZORAL) 2 % external cream Apply daily to affected area on face. 30 g 7     ketoconazole (NIZORAL) 2 % external shampoo Leave on scalp for 5 minutes then rinse. Use 2-3 time weekly. 120 mL 6     levothyroxine (SYNTHROID/LEVOTHROID) 137 MCG tablet TAKE ONE TABLET BY MOUTH ONCE DAILY 90 tablet 1     lisinopril (ZESTRIL) 20 MG tablet TAKE ONE TABLET BY MOUTH ONCE DAILY 90 tablet 3     LORazepam (ATIVAN) 1 MG tablet Take 1 tablet (1 mg) by mouth daily as needed (severe anxiety/panic/sleep) 10 tablet 0     magnesium oxide (MAG-OX) 400 MG tablet TAKE 1 TABLET BY MOUTH TWICE A  tablet 11     metoprolol succinate ER (TOPROL XL) 25 MG 24 hr tablet TAKE 2 TABLETS (50 MG) BY MOUTH EVERY MORNING AND 1 TABLET (25 MG) EVERY EVENING.  270 tablet 0     mycophenolate (GENERIC EQUIVALENT) 250 MG capsule Take 2 capsules (500 mg) by mouth 2 times daily 120 capsule 11     sulfamethoxazole-trimethoprim (BACTRIM) 400-80 MG tablet TAKE ONE TABLET BY MOUTH ONCE DAILY 30 tablet 11     tacrolimus (GENERIC) 0.5 MG capsule TAKE ONE CAPSULE BY MOUTH EVERY MORNING (TOTAL DOSE IS 1.5MG EVERY MORNING AND 1MG EVERY EVENING) 90 capsule 3     tacrolimus (GENERIC) 1 MG capsule TAKE ONE CAPSULE BY MOUTH TWICE A DAY TOTAL DOSE 1.5MG IN THE MORNING AND 1MG IN THE EVENING 180 capsule 3     tacrolimus (PROTOPIC) 0.1 % external ointment Apply sparingly daily to face then reduce in 2-3 time weekly. 30 g 5     traZODone (DESYREL) 50 MG tablet Take 1 tablet (50 mg) by mouth at bedtime as needed, may repeat once for sleep 180 tablet 0     No current facility-administered medications for this visit.       Allergies   Allergen Reactions     Hydromorphone Itching       Vitals: /81 (BP Location: Right arm, Patient Position: Chair, Cuff Size: Adult Regular)   Pulse 60   Wt 93.3 kg (205 lb 9.6 oz)   SpO2 97%   BMI 29.71 kg/m    BMI= Body mass index is 29.71 kg/m .     General: appears comfortable, alert and articulate  Head: normocephalic, atraumatic  Eyes: anicteric sclera, EOMI  Neck: no adenopathy or masses, 2+ carotids without bruits  Orophyarynx: moist mucosa, no lesions, dentition intact  Heart: regular, normal S1, loud S2, no murmur, gallop, or rub,   Lungs: clear, no rales or wheezing  Abdomen: soft, non-tender, bowel sounds present, no hepatomegaly  Extremities: no clubbing, cyanosis or edema  Neurological: normal speech and affect, no gross motor deficits    Device check today:  Device: [employer] [id number] Meigs XT DR MRI Normal device function. Mode: AAI< > DDD 50- 150 bpm AP: 2. 7% : < 0. 1% Intrinsic rhythm: AS- VS 64 bpm Thoracic Impedance: Near reference line. Short V- V intervals: 39 Lead Trends Appear Stable. Atrial thresholds have a slight increase over  the past 6 mos, 1. 75 V @ 0. 4 ms today Estimated battery longevity to RRT = 11. 6 years. Battery voltage = 3. 01 V. Atrial Arrhythmia: None AF [place]: 0% Anticoagulant: Eliquis Ventricular Arrhythmia: 1 NSVT, 222 bpm lasting 1 sec. Setting Changes: None Patient has an appointment to see Dr. Torres and Emi Edmonds today. Plan: Device follow- up every 3 months.      12 lead ECG: NSR 57 bpm, RAD RVH    Echo today:  Moderate to severely reduced function of systemic ventricle, no baffle obstruction.   Patient after atrial switch operation for complete transposition of the great  arteries.     Technically difficult study due to poor acoustic windows. Moderate to severe  right ventricular dilatation with moderate to severely decreased systolic  function (no significant change compared to last echocardiogram). Mild  tricuspid valve insufficiency. The pulmonary venous baffle is unobstructed;  the systemic venous baffle is unobstructed (mean gradient of 2 mmHg).  Qualitatively, normal left ventricular systolic function. Mild pulmonary valve  insufficiency.    CMR 2023  Clinical history: 42 M d-TGA s/p baffle repair, repaired VSD, combined liver/renal transplant, depressed  systemic ventricle function, fatty metaplasia of sub-pulmonic ventricle and narrowing of intra-hepatic IVC    Comparison CMR: 03/2020     1. The subpulmonic (morphologic left) ventricle is small in cavity size. The subpulmonic ventricle exhibits  wall thinning with extensive fatty metaplasia in the mid and apical segments. The global systolic function  of the subpulmonic ventricle is normal. The subpulmonic ventricle's EF is 57%. There are no regional wall  motion abnormalities. The VSD repair is visualized in the basal septum and is intact. QpQs is 1.1.      2. The systemic (morphologic right) ventricle is moderately enlarged with severe hypertrophy. The global  systolic function of the systemic ventricle is moderately reduced. The systemic  ventricle's EF is 35%.     3. There is a single SVC and a single IVC.  Both drain unobstructed into the sub-pulmonic ventricle via  baffles. The intrahepatic segment of the native IVC is mildly narrowed. There does not appear to be any  baffle obstruction. Baffle leak cannot be assessed on this study (contrast MRA not done).      4. There is mild-moderate regurgitation of the systemic atrioventricular (tricuspid) valve. There is mild  FARHANA of the sub-pulmonic AV valve (mitral) without septal contact or obstruction. There is mild PI.      5. Late gadolinium enhancement imaging shows extensive enhancement in the sub-pulmonic ventricle in a near  circumferential pattern in the mid and apical segments related to fatty metaplasia.      6. There is no pericardial effusion or thickening.     7. There is no intracardiac thrombus.     CONCLUSIONS: d-TGA with atrial baffle repair.  The systemic ventricle is moderately enlarged with  moderately reduced function, EF 35%. The subpulmonic ventricle is small in size with normal function, EF  57%. Normal baffles without any obstruction or thrombus. There is mild-moderate regurgitation of the  systemic atrioventricular (tricuspid) valve. The subpulmonic ventricle exhibits extensive fatty metaplasia  and LGE in the mid and apical segments.   Compared to the prior examination from 2020, the systemic ventricle's function appears similar  to mildly improved.      CORE EXAM      CMR Report 3-   SUMMARY  Clinical history:39-year old male with a history of d-TGA (with baffle repair), small VSD (closed with a  stitch) and combined liver/renal transplant. He is known to have depressed systemic ventricle function. CMR  to evaluate RV function.   Comparison CMR: 03/09/2017 ([facility])  SITUS: There is normal situs The liver is in the right upper quadrant and a single spleen in the left  upper quadrant.   CAVAE: There is a single SVC and a single IVC.both drain  unobstructed into the sub-pulmonic ventricle via  baffle.  SVC baffle measures 1.6 x 1.0 cm. IVC baffle measures 1.5 x 1.4 cm. The intrahepatic segment of  the native IVC measures 13.9 x 5.3 mm at its narrowest point. There does not appear to be any baffle  obstruction or leakage.   PULMONARY VEINS: There are four pulmonary veins with two left-sided veins and two right-sided veins.They  drain in an unobstructed fashion via baffle to the systemic ventricle.   ATRIOVENTRICULAR CONNECTION: The atrioventricular connection is discordant. There is mild-moderate  regurgitation of the systemic atrioventricular (tricuspid) valve. There is mild FARHANA of the sub-pulmonic AV  valve (mitral) without septal contact or obstruction.   VENTRICLES: There is levocardia. The systemic (morphologic right) ventricle is moderately enlarged with  severe hypertrophy. The global systolic function of the systemic ventricle is severely reduced. The  systemic ventricle's EF is 27%. The subpulmonic (morphologic left) ventricle is smallï cavity size. The  subpulmonic ventricle exhibits wall thinning and a pattern of fatty metaplasia with enhancement on LGE  imaging in the mid-anterolateral, mid anteroseptal, apical septal, and apical lateral segments. The global  systolic function of the subpulmonic ventricle is normal. The subpulmonic ventricle's EF is 57%. There are  no regional wall motion abnormalities. The VSD repair is visualized in the basal septum and is intact.  There is no evidence of intracardiac shunting (Qp/Qs 1.0).  VENTRICULOARTERIAL CONNECTION: The ventriculoarterial connection is discordant.There is no significant  valvular disease.   AORTA AND SUPRA-AORTIC VESSELS: The aortic arch is left-sided with normal branching of the vessels.   PULMONARY ARTERY: The main pulmonary artery is mildly enlarged. The branch pulmonary arteries are normal in  size.   CONCLUSIONS: d-TGA with atrial baffle repair.  The systemic ventricle is moderately  enlarged with severely  reduced function, EF 27%. The subpulmonic ventricle is small in size with normal function, EF 57%. Normal  baffles without any obstruction, thrombus or leakage. There is mild-moderate regurgitation of the systemic  atrioventricular (tricuspid) valve. There is no residual intracardiac shunting (Qp/Qs 1.0).   The subpulmonic ventricle exhibits extensive fatty metaplasia in the mid and apical segments of unclear  significance. Narrowing of intra-hepatic IVC of unclear significance.   Compared to the prior examination dated 03/09/2017 the systemic ventricle's function appears similar  visually. The fatty metaplasia of sub-pulmonic ventricle and narrowing of intra-hepatic IVC  is unchanged  from prior examinations.         Assessment and Plan:  42 yo M with history of d - transposition of the great vessels s/p baffle repair and suture closure of small VSD in 1981, atrial flutter s/p ablation, with depressed systemic RV function EF 35% on last MRI in 2023.  s/p liver and kidney transplant for EtOH abuse, post-transplant lymphoproliferative disorder, papillary thyroid cancer in remission  s/p thryoidectomy presents for ongoing evaluation and management.  No new cardiac symptoms. Some NSVT on device check, seeing Emi Perera today    1.  NO change in medications  2. Get regular activity  3. Have labs completed for Dr Torres while you have your lab draw on 9/4  4. Regular dental cleanings, no SBE prophylaxis required    Follow up with Dr Torres in 1 year with an echo prior. Follow up with EP in 1 year. We will plan on a cardiopulmonary stress testing and cardiac MRI in 2 years   Please call if new symptoms or concerns.     Rah Torres M.D.   of Pediatrics  Pediatric and Adult Congenital Cardiology  [facility]  Pediatric Cardiology Office [phone]  Adult Congenital Cardiology Triage and Scheduling  [phone]    A total of 30 minutes were spent in personal review of testing, including echo and ECGI, review of documented history and interval events in chart, and face to face visit with discussion of findings and plan.             Please do not hesitate to contact me if you have any questions/concerns.     Sincerely,     Rah Torres MD

## 2024-08-23 NOTE — LETTER
8/23/2024       RE: Cricket Chen  [address]  [facility] [address]     Dear Colleague,    Thank you for referring your patient, Cricket Chen, to the [facility] at [facility]. Please see a copy of my visit note below.    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 IS seeing Dr. George from EP today as well.     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 He has had atrial tachycardia with cardioversion in 2018 and ablation in 2000.  He has reduced systemic function.  s/p liver and kidney transplant for EtOH abuse in 2016, post-transplant lymphoproliferative disorder, papillary thyroid cancer s/p thryoidectomy clear at 5 years who presents today for ongoing evaluation and management.  Pt reports that he has had no significant illnesses or hospitalizations since his last visit. He remains very active, walking and training new dog. No shortness of breath He denies any chest pain orthopnea, pnd, syncope/presyncope, or suzette. Slight  Decrease in exercise tolerance over time.   He denies any problems with his medications. Sober for many years.     No recent hospitalizations illnesses or surgery. Father passed away suddenly this year at age 78. Mother undergoing treatment for breast cancer. Due for labs September 4th       PMH: Cardiac Hx as above  Past Medical History:   Diagnosis Date     Alcohol abuse     Last drink in Mid-April 2014     Anemia in ESRD (end-stage renal disease) (H)      Anxiety 2008     Atrial flutter (H) 2017     Cirrhosis (H)     S/P liver transplant     Depression      History  of blood transfusion      History of transposition of great vessels     atrial switch at age 8 months old     Hypertension      Liver transplant recipient (H)     2016     Papillary thyroid carcinoma (H)      Pneumonia 11-15-14     Renal transplant recipient     2016     Varices, esophageal (H)        Past Surgical History:   Procedure Laterality Date     ANESTHESIA CARDIOVERSION N/A 3/7/2018    Procedure: ANESTHESIA CARDIOVERSION;  Cardioversion;  Surgeon: GENERIC ANESTHESIA PROVIDER;  Location: [facility] OR     BENCH LIVER N/A 5/10/2016    Procedure: BENCH LIVER;  Surgeon: Ricky Deshpande MD;  [facility]     BIOPSY LYMPH NODE CERVICAL Right 1/26/2017    Procedure: BIOPSY LYMPH NODE CERVICAL;  Surgeon: Beka Soto MD;  [facility]     CARDIAC SURGERY  9-10-80     CREATE FISTULA ARTERIOVENOUS UPPER EXTREMITY Right 9/16/2014    Procedure: CREATE FISTULA ARTERIOVENOUS UPPER EXTREMITY;  Surgeon: Padmaja Eaton MD;  [facility]: [facility] OR     CV RIGHT HEART CATH MEASUREMENTS RECORDED N/A 4/19/2019    Procedure: CV RIGHT HEART CATH;  Surgeon: Sabi Wiggins MD;  Location:  HEART CARDIAC CATH LAB     CYSTOSCOPY, REMOVE STENT(S), COMBINED Right 6/22/2016    Procedure: COMBINED CYSTOSCOPY, REMOVE STENT(S);  Surgeon: Ricky Deshpande MD;  [facility]     EMBOLECTOMY UPPER EXTREMITY Right 8/17/2018    Procedure: EMBOLECTOMY UPPER EXTREMITY;  Repair Right Upper Arm Pseudo Aneursym ;  Surgeon: John Payton MD;  Location:  OR     ENT SURGERY       EP INSERT ICD Left 2/28/2023    Procedure: Insert Implantable Cardioverter-Defibrillator (ICD);  Surgeon: Jaylen George MD;  Location:  HEART CARDIAC CATH LAB     ESOPHAGOSCOPY, GASTROSCOPY, DUODENOSCOPY (EGD), COMBINED  5/30/2014    Procedure: COMBINED ESOPHAGOSCOPY, GASTROSCOPY, DUODENOSCOPY (EGD);  Surgeon: Guillaume Bautista MD;  Location:  GI     ESOPHAGOSCOPY, GASTROSCOPY, DUODENOSCOPY (EGD), COMBINED  9/30/14     ESOPHAGOSCOPY,  GASTROSCOPY, DUODENOSCOPY (EGD), COMBINED Left 3/12/2015    Procedure: COMBINED ESOPHAGOSCOPY, GASTROSCOPY, DUODENOSCOPY (EGD);  Surgeon: Laureen Galvan MD;  [facility]     ESOPHAGOSCOPY, GASTROSCOPY, DUODENOSCOPY (EGD), COMBINED N/A 2016    Procedure: COMBINED ESOPHAGOSCOPY, GASTROSCOPY, DUODENOSCOPY (EGD);  Surgeon: Laureen Galvan MD;  [facility]: [facility] GI     ESOPHAGOSCOPY, GASTROSCOPY, DUODENOSCOPY (EGD), COMBINED N/A 2016    Procedure: COMBINED ESOPHAGOSCOPY, GASTROSCOPY, DUODENOSCOPY (EGD);  Surgeon: Laureen Galvan MD;  [facility] GI     HC OR CATH ABLATION NON-CARDIAC ENDOVASCULAR      SVT     INSERT PORT VASCULAR ACCESS N/A 4/3/2017    Procedure: INSERT PORT VASCULAR ACCESS;  Surgeon: Rajendra Jacques PA-C;  [facility]     IR PARACENTESIS  2014     IR PARACENTESIS  2014     IR PORT REMOVAL LEFT  2019     LIGATE FISTULA ARTERIOVENOUS UPPER EXTREMITY Right 2017    Procedure: LIGATE FISTULA ARTERIOVENOUS UPPER EXTREMITY;  Revision of Right Arm Arteriovenous Fistula ;  Surgeon: Padmaja Eaton MD;  [facility]     PERCUTANEOUS BIOPSY KIDNEY Right 2018    Procedure: PERCUTANEOUS BIOPSY KIDNEY;  Right Kidney Biopsy;  Surgeon: Yuval Khan MD;  [facility]     PERCUTANEOUS BIOPSY KIDNEY Right 10/30/2019    Procedure: Right Side Kidney Biopsy;  Surgeon: Jake Sihdu MD;  [facility]     PICC INSERTION  14    PICC line placement 14; Removal 2014     REMOVE PORT VASCULAR ACCESS Right 2019    Procedure: Right chest Port Removal;  Surgeon: Erasmo Ziegler PA-C;  [facility]     THORACIC SURGERY  1980    Transposition great arteries, repaired at 8 months     THYROIDECTOMY Right 2017    Procedure: THYROIDECTOMY;  Bilateral Total Thyroidectomy ;  Surgeon: Beka Soto MD;  [facility]: [facility]     TRANSPLANT KIDNEY RECIPIENT  DONOR  5/10/2016    Procedure:  TRANSPLANT KIDNEY RECIPIENT  DONOR;  Surgeon: Ricky Deshpande MD;  [facility]     TRANSPLANT LIVER RECIPIENT  DONOR N/A 5/10/2016    Procedure: TRANSPLANT LIVER RECIPIENT  DONOR;  Surgeon: Ricky Deshpande MD;  [facility]   As above, including HPI    Family History   Problem Relation Age of Onset     Hypertension Mother      Anxiety Disorder Mother      Hyperlipidemia Mother      Arthritis Father      Hypertension Father      Hypertension Sister      Anxiety Disorder Sister      No Known Problems Brother      No Known Problems Brother      No Known Problems Maternal Grandmother      No Known Problems Maternal Grandfather      No Known Problems Paternal Grandmother      No Known Problems Paternal Grandfather      Alcohol/Drug No family hx of      Gastrointestinal Disease No family hx of         no fam hx of liver disease or liver cancer       Meds  Current Outpatient Medications   Medication Sig Dispense Refill     ELIQUIS ANTICOAGULANT 5 MG tablet TAKE 1 TABLET (5 MG) BY MOUTH 2 TIMES DAILY 180 tablet 1     fluocinonide (LIDEX) 0.05 % external solution Apply sparingly twice daily to scalp  as needed. 60 mL 5     ketoconazole (NIZORAL) 2 % external cream Apply daily to affected area on face. 30 g 7     ketoconazole (NIZORAL) 2 % external shampoo Leave on scalp for 5 minutes then rinse. Use 2-3 time weekly. 120 mL 6     levothyroxine (SYNTHROID/LEVOTHROID) 137 MCG tablet TAKE ONE TABLET BY MOUTH ONCE DAILY 90 tablet 1     lisinopril (ZESTRIL) 20 MG tablet TAKE ONE TABLET BY MOUTH ONCE DAILY 90 tablet 3     LORazepam (ATIVAN) 1 MG tablet Take 1 tablet (1 mg) by mouth daily as needed (severe anxiety/panic/sleep) 10 tablet 0     magnesium oxide (MAG-OX) 400 MG tablet TAKE 1 TABLET BY MOUTH TWICE A  tablet 11     metoprolol succinate ER (TOPROL XL) 25 MG 24 hr tablet TAKE 2 TABLETS (50 MG) BY MOUTH EVERY MORNING AND 1 TABLET (25 MG) EVERY EVENING. 270 tablet 0      mycophenolate (GENERIC EQUIVALENT) 250 MG capsule Take 2 capsules (500 mg) by mouth 2 times daily 120 capsule 11     sulfamethoxazole-trimethoprim (BACTRIM) 400-80 MG tablet TAKE ONE TABLET BY MOUTH ONCE DAILY 30 tablet 11     tacrolimus (GENERIC) 0.5 MG capsule TAKE ONE CAPSULE BY MOUTH EVERY MORNING (TOTAL DOSE IS 1.5MG EVERY MORNING AND 1MG EVERY EVENING) 90 capsule 3     tacrolimus (GENERIC) 1 MG capsule TAKE ONE CAPSULE BY MOUTH TWICE A DAY TOTAL DOSE 1.5MG IN THE MORNING AND 1MG IN THE EVENING 180 capsule 3     tacrolimus (PROTOPIC) 0.1 % external ointment Apply sparingly daily to face then reduce in 2-3 time weekly. 30 g 5     traZODone (DESYREL) 50 MG tablet Take 1 tablet (50 mg) by mouth at bedtime as needed, may repeat once for sleep 180 tablet 0     No current facility-administered medications for this visit.       Allergies   Allergen Reactions     Hydromorphone Itching       Vitals: /81 (BP Location: Right arm, Patient Position: Chair, Cuff Size: Adult Regular)   Pulse 60   Wt 93.3 kg (205 lb 9.6 oz)   SpO2 97%   BMI 29.71 kg/m    BMI= Body mass index is 29.71 kg/m .     General: appears comfortable, alert and articulate  Head: normocephalic, atraumatic  Eyes: anicteric sclera, EOMI  Neck: no adenopathy or masses, 2+ carotids without bruits  Orophyarynx: moist mucosa, no lesions, dentition intact  Heart: regular, normal S1, loud S2, no murmur, gallop, or rub,   Lungs: clear, no rales or wheezing  Abdomen: soft, non-tender, bowel sounds present, no hepatomegaly  Extremities: no clubbing, cyanosis or edema  Neurological: normal speech and affect, no gross motor deficits    Device check today:  Device: [id number] [place] XT DR MRI Normal device function. Mode: AAI< > DDD 50- 150 bpm AP: 2. 7% : < 0. 1% Intrinsic rhythm: AS- VS 64 bpm Thoracic Impedance: Near reference line. Short V- V intervals: 39 Lead Trends Appear Stable. Atrial thresholds have a slight increase over the past 6 mos, 1.  75 V @ 0. 4 ms today Estimated battery longevity to RRT = 11. 6 years. Battery voltage = 3. 01 V. Atrial Arrhythmia: None AF Curtice: 0% Anticoagulant: Eliquis Ventricular Arrhythmia: 1 NSVT, 222 bpm lasting 1 sec. Setting Changes: None Patient has an appointment to see Dr. Torres and Emi Edmonds today. Plan: Device follow- up every 3 months.      12 lead ECG: NSR 57 bpm, RAD RVH    Echo today:  Moderate to severely reduced function of systemic ventricle, no baffle obstruction.   Patient after atrial switch operation for complete transposition of the great  arteries.     Technically difficult study due to poor acoustic windows. Moderate to severe  right ventricular dilatation with moderate to severely decreased systolic  function (no significant change compared to last echocardiogram). Mild  tricuspid valve insufficiency. The pulmonary venous baffle is unobstructed;  the systemic venous baffle is unobstructed (mean gradient of 2 mmHg).  Qualitatively, normal left ventricular systolic function. Mild pulmonary valve  insufficiency.    CMR 2023  Clinical history: 42 M d-TGA s/p baffle repair, repaired VSD, combined liver/renal transplant, depressed  systemic ventricle function, fatty metaplasia of sub-pulmonic ventricle and narrowing of intra-hepatic IVC    Comparison CMR: 03/2020     1. The subpulmonic (morphologic left) ventricle is small in cavity size. The subpulmonic ventricle exhibits  wall thinning with extensive fatty metaplasia in the mid and apical segments. The global systolic function  of the subpulmonic ventricle is normal. The subpulmonic ventricle's EF is 57%. There are no regional wall  motion abnormalities. The VSD repair is visualized in the basal septum and is intact. QpQs is 1.1.      2. The systemic (morphologic right) ventricle is moderately enlarged with severe hypertrophy. The global  systolic function of the systemic ventricle is moderately reduced. The systemic ventricle's EF is 35%.      3. There is a single SVC and a single IVC.  Both drain unobstructed into the sub-pulmonic ventricle via  baffles. The intrahepatic segment of the native IVC is mildly narrowed. There does not appear to be any  baffle obstruction. Baffle leak cannot be assessed on this study (contrast MRA not done).      4. There is mild-moderate regurgitation of the systemic atrioventricular (tricuspid) valve. There is mild  FARHANA of the sub-pulmonic AV valve (mitral) without septal contact or obstruction. There is mild PI.      5. Late gadolinium enhancement imaging shows extensive enhancement in the sub-pulmonic ventricle in a near  circumferential pattern in the mid and apical segments related to fatty metaplasia.      6. There is no pericardial effusion or thickening.     7. There is no intracardiac thrombus.     CONCLUSIONS: d-TGA with atrial baffle repair.  The systemic ventricle is moderately enlarged with  moderately reduced function, EF 35%. The subpulmonic ventricle is small in size with normal function, EF  57%. Normal baffles without any obstruction or thrombus. There is mild-moderate regurgitation of the  systemic atrioventricular (tricuspid) valve. The subpulmonic ventricle exhibits extensive fatty metaplasia  and LGE in the mid and apical segments.   Compared to the prior examination from 2020, the systemic ventricle's function appears similar  to mildly improved.      CORE EXAM      CMR Report 3-   SUMMARY  Clinical history:39-year old male with a history of d-TGA (with baffle repair), small VSD (closed with a  stitch) and combined liver/renal transplant. He is known to have depressed systemic ventricle function. CMR  to evaluate RV function.   Comparison CMR: 03/09/2017 ([facility])  SITUS: There is normal situs The liver is in the right upper quadrant and a single spleen in the left  upper quadrant.   CAVAE: There is a single SVC and a single IVC.both drain unobstructed into the  sub-pulmonic ventricle via  baffle.  SVC baffle measures 1.6 x 1.0 cm. IVC baffle measures 1.5 x 1.4 cm. The intrahepatic segment of  the native IVC measures 13.9 x 5.3 mm at its narrowest point. There does not appear to be any baffle  obstruction or leakage.   PULMONARY VEINS: There are four pulmonary veins with two left-sided veins and two right-sided veins.They  drain in an unobstructed fashion via baffle to the systemic ventricle.   ATRIOVENTRICULAR CONNECTION: The atrioventricular connection is discordant. There is mild-moderate  regurgitation of the systemic atrioventricular (tricuspid) valve. There is mild FARHANA of the sub-pulmonic AV  valve (mitral) without septal contact or obstruction.   VENTRICLES: There is levocardia. The systemic (morphologic right) ventricle is moderately enlarged with  severe hypertrophy. The global systolic function of the systemic ventricle is severely reduced. The  systemic ventricle's EF is 27%. The subpulmonic (morphologic left) ventricle is smallï cavity size. The  subpulmonic ventricle exhibits wall thinning and a pattern of fatty metaplasia with enhancement on LGE  imaging in the mid-anterolateral, mid anteroseptal, apical septal, and apical lateral segments. The global  systolic function of the subpulmonic ventricle is normal. The subpulmonic ventricle's EF is 57%. There are  no regional wall motion abnormalities. The VSD repair is visualized in the basal septum and is intact.  There is no evidence of intracardiac shunting (Qp/Qs 1.0).  VENTRICULOARTERIAL CONNECTION: The ventriculoarterial connection is discordant.There is no significant  valvular disease.   AORTA AND SUPRA-AORTIC VESSELS: The aortic arch is left-sided with normal branching of the vessels.   PULMONARY ARTERY: The main pulmonary artery is mildly enlarged. The branch pulmonary arteries are normal in  size.   CONCLUSIONS: d-TGA with atrial baffle repair.  The systemic ventricle is moderately enlarged with  severely  reduced function, EF 27%. The subpulmonic ventricle is small in size with normal function, EF 57%. Normal  baffles without any obstruction, thrombus or leakage. There is mild-moderate regurgitation of the systemic  atrioventricular (tricuspid) valve. There is no residual intracardiac shunting (Qp/Qs 1.0).   The subpulmonic ventricle exhibits extensive fatty metaplasia in the mid and apical segments of unclear  significance. Narrowing of intra-hepatic IVC of unclear significance.   Compared to the prior examination dated 03/09/2017 the systemic ventricle's function appears similar  visually. The fatty metaplasia of sub-pulmonic ventricle and narrowing of intra-hepatic IVC  is unchanged  from prior examinations.         Assessment and Plan:  42 yo M with history of d - transposition of the great vessels s/p baffle repair and suture closure of small VSD in 1981, atrial flutter s/p ablation, with depressed systemic RV function EF 35% on last MRI in 2023.  s/p liver and kidney transplant for EtOH abuse, post-transplant lymphoproliferative disorder, papillary thyroid cancer in remission  s/p thryoidectomy presents for ongoing evaluation and management.  No new cardiac symptoms. Some NSVT on device check, seeing Emi Perera today    1.  NO change in medications  2. Get regular activity  3. Have labs completed for Dr Torres while you have your lab draw on 9/4  4. Regular dental cleanings, no SBE prophylaxis required    Follow up with Dr Torres in 1 year with an echo prior. Follow up with EP in 1 year. We will plan on a cardiopulmonary stress testing and cardiac MRI in 2 years   Please call if new symptoms or concerns.     Rah Torres M.D.   of Pediatrics  Pediatric and Adult Congenital Cardiology  [facility]  Pediatric Cardiology Office [phone]  Adult Congenital Cardiology Triage and Scheduling  [phone]    A total of 30 minutes were spent in personal review of testing, including echo and ECGI, review of documented history and interval events in chart, and face to face visit with discussion of findings and plan.               Again, thank you for allowing me to participate in the care of your patient.      Sincerely,    Rah Torres MD

## 2024-08-23 NOTE — NURSING NOTE
Chief Complaint   Patient presents with    Follow Up     Achd visit 3 mo post crtd implant       Vitals were taken, medications reconciled, and EKG was performed.    Rajendra Samaniego, EMT  12:13 PM

## 2024-08-23 NOTE — PROGRESS NOTES
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 IS seeing Dr. George from  today as well.     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 He has had atrial tachycardia with cardioversion in 2018 and ablation in 2000.  He has reduced systemic function.  s/p liver and kidney transplant for EtOH abuse in 2016, post-transplant lymphoproliferative disorder, papillary thyroid cancer s/p thryoidectomy clear at 5 years who presents today for ongoing evaluation and management.  Pt reports that he has had no significant illnesses or hospitalizations since his last visit. He remains very active, walking and training new dog. No shortness of breath He denies any chest pain orthopnea, pnd, syncope/presyncope, or suzette. Slight  Decrease in exercise tolerance over time.   He denies any problems with his medications. Sober for many years.     No recent hospitalizations illnesses or surgery. Father passed away suddenly this year at age 78. Mother undergoing treatment for breast cancer. Due for labs September 4th       PMH: Cardiac Hx as above  Past Medical History:   Diagnosis Date    Alcohol abuse     Last drink in Mid-April 2014    Anemia in ESRD (end-stage renal disease) (H)     Anxiety 2008    Atrial flutter (H) 2017    Cirrhosis (H)     S/P liver transplant    Depression     History of blood transfusion     History of transposition of great vessels     atrial switch at age 8 months old    Hypertension     Liver transplant recipient (H)     2016    Papillary thyroid carcinoma (H)     Pneumonia 11-15-14    Renal transplant recipient     2016    Varices, esophageal (H)        Past Surgical History:    Procedure Laterality Date    ANESTHESIA CARDIOVERSION N/A 3/7/2018    Procedure: ANESTHESIA CARDIOVERSION;  Cardioversion;  Surgeon: GENERIC ANESTHESIA PROVIDER;  Location:  OR    BENCH LIVER N/A 5/10/2016    Procedure: BENCH LIVER;  Surgeon: Ricky Deshpande MD;  [facility]: [facility]    BIOPSY LYMPH NODE CERVICAL Right 1/26/2017    Procedure: BIOPSY LYMPH NODE CERVICAL;  Surgeon: Beka Soto MD;  [facility]: [facility]    CARDIAC SURGERY  9-10-80    CREATE FISTULA ARTERIOVENOUS UPPER EXTREMITY Right 9/16/2014    Procedure: CREATE FISTULA ARTERIOVENOUS UPPER EXTREMITY;  Surgeon: Padmaja Eaton MD;  Location:  OR    CV RIGHT HEART CATH MEASUREMENTS RECORDED N/A 4/19/2019    Procedure: CV RIGHT HEART CATH;  Surgeon: Sabi Wiggins MD;  Location:  HEART CARDIAC CATH LAB    CYSTOSCOPY, REMOVE STENT(S), COMBINED Right 6/22/2016    Procedure: COMBINED CYSTOSCOPY, REMOVE STENT(S);  Surgeon: Ricky Deshpande MD;  [facility]    EMBOLECTOMY UPPER EXTREMITY Right 8/17/2018    Procedure: EMBOLECTOMY UPPER EXTREMITY;  Repair Right Upper Arm Pseudo Aneursym ;  Surgeon: John Payton MD;  Location:  OR    ENT SURGERY      EP INSERT ICD Left 2/28/2023    Procedure: Insert Implantable Cardioverter-Defibrillator (ICD);  Surgeon: Jaylen George MD;  Location:  HEART CARDIAC CATH LAB    ESOPHAGOSCOPY, GASTROSCOPY, DUODENOSCOPY (EGD), COMBINED  5/30/2014    Procedure: COMBINED ESOPHAGOSCOPY, GASTROSCOPY, DUODENOSCOPY (EGD);  Surgeon: Guillaume Bautista MD;  Location:  GI    ESOPHAGOSCOPY, GASTROSCOPY, DUODENOSCOPY (EGD), COMBINED  9/30/14    ESOPHAGOSCOPY, GASTROSCOPY, DUODENOSCOPY (EGD), COMBINED Left 3/12/2015    Procedure: COMBINED ESOPHAGOSCOPY, GASTROSCOPY, DUODENOSCOPY (EGD);  Surgeon: Laureen Galvan MD;  Location:  GI    ESOPHAGOSCOPY, GASTROSCOPY, DUODENOSCOPY (EGD), COMBINED N/A 4/21/2016    Procedure: COMBINED ESOPHAGOSCOPY, GASTROSCOPY, DUODENOSCOPY (EGD);  Surgeon:  Laureen Galvan MD;  Location:  GI    ESOPHAGOSCOPY, GASTROSCOPY, DUODENOSCOPY (EGD), COMBINED N/A 2016    Procedure: COMBINED ESOPHAGOSCOPY, GASTROSCOPY, DUODENOSCOPY (EGD);  Surgeon: Laureen Galvan MD;  Location: [facility] GI    HC OR CATH ABLATION NON-CARDIAC ENDOVASCULAR      SVT    INSERT PORT VASCULAR ACCESS N/A 4/3/2017    Procedure: INSERT PORT VASCULAR ACCESS;  Surgeon: Rajendra Jacques PA-C;  [facility]    IR PARACENTESIS  2014    IR PARACENTESIS  2014    IR PORT REMOVAL LEFT  2019    LIGATE FISTULA ARTERIOVENOUS UPPER EXTREMITY Right 2017    Procedure: LIGATE FISTULA ARTERIOVENOUS UPPER EXTREMITY;  Revision of Right Arm Arteriovenous Fistula ;  Surgeon: Padmaja Eaton MD;  [facility]    PERCUTANEOUS BIOPSY KIDNEY Right 2018    Procedure: PERCUTANEOUS BIOPSY KIDNEY;  Right Kidney Biopsy;  Surgeon: Yuval Khan MD;  [facility]    PERCUTANEOUS BIOPSY KIDNEY Right 10/30/2019    Procedure: Right Side Kidney Biopsy;  Surgeon: Jake Sidhu MD;  [facility]    PICC INSERTION  14    PICC line placement 14; Removal 2014    REMOVE PORT VASCULAR ACCESS Right 2019    Procedure: Right chest Port Removal;  Surgeon: Erasmo Ziegler PA-C;  Location:  OR    THORACIC SURGERY  1980    Transposition great arteries, repaired at 8 months    THYROIDECTOMY Right 2017    Procedure: THYROIDECTOMY;  Bilateral Total Thyroidectomy ;  Surgeon: Beka Soto MD;  [facility]: [facility]    TRANSPLANT KIDNEY RECIPIENT  DONOR  5/10/2016    Procedure: TRANSPLANT KIDNEY RECIPIENT  DONOR;  Surgeon: Ricky Deshpande MD;  [facility]    TRANSPLANT LIVER RECIPIENT  DONOR N/A 5/10/2016    Procedure: TRANSPLANT LIVER RECIPIENT  DONOR;  Surgeon: Ricky Deshpande MD;  [facility]   As above, including HPI    Family History   Problem Relation Age of Onset    Hypertension  Mother     Anxiety Disorder Mother     Hyperlipidemia Mother     Arthritis Father     Hypertension Father     Hypertension Sister     Anxiety Disorder Sister     No Known Problems Brother     No Known Problems Brother     No Known Problems Maternal Grandmother     No Known Problems Maternal Grandfather     No Known Problems Paternal Grandmother     No Known Problems Paternal Grandfather     Alcohol/Drug No family hx of     Gastrointestinal Disease No family hx of         no fam hx of liver disease or liver cancer       Meds  Current Outpatient Medications   Medication Sig Dispense Refill    ELIQUIS ANTICOAGULANT 5 MG tablet TAKE 1 TABLET (5 MG) BY MOUTH 2 TIMES DAILY 180 tablet 1    fluocinonide (LIDEX) 0.05 % external solution Apply sparingly twice daily to scalp  as needed. 60 mL 5    ketoconazole (NIZORAL) 2 % external cream Apply daily to affected area on face. 30 g 7    ketoconazole (NIZORAL) 2 % external shampoo Leave on scalp for 5 minutes then rinse. Use 2-3 time weekly. 120 mL 6    levothyroxine (SYNTHROID/LEVOTHROID) 137 MCG tablet TAKE ONE TABLET BY MOUTH ONCE DAILY 90 tablet 1    lisinopril (ZESTRIL) 20 MG tablet TAKE ONE TABLET BY MOUTH ONCE DAILY 90 tablet 3    LORazepam (ATIVAN) 1 MG tablet Take 1 tablet (1 mg) by mouth daily as needed (severe anxiety/panic/sleep) 10 tablet 0    magnesium oxide (MAG-OX) 400 MG tablet TAKE 1 TABLET BY MOUTH TWICE A  tablet 11    metoprolol succinate ER (TOPROL XL) 25 MG 24 hr tablet TAKE 2 TABLETS (50 MG) BY MOUTH EVERY MORNING AND 1 TABLET (25 MG) EVERY EVENING. 270 tablet 0    mycophenolate (GENERIC EQUIVALENT) 250 MG capsule Take 2 capsules (500 mg) by mouth 2 times daily 120 capsule 11    sulfamethoxazole-trimethoprim (BACTRIM) 400-80 MG tablet TAKE ONE TABLET BY MOUTH ONCE DAILY 30 tablet 11    tacrolimus (GENERIC) 0.5 MG capsule TAKE ONE CAPSULE BY MOUTH EVERY MORNING (TOTAL DOSE IS 1.5MG EVERY MORNING AND 1MG EVERY EVENING) 90 capsule 3    tacrolimus  (GENERIC) 1 MG capsule TAKE ONE CAPSULE BY MOUTH TWICE A DAY TOTAL DOSE 1.5MG IN THE MORNING AND 1MG IN THE EVENING 180 capsule 3    tacrolimus (PROTOPIC) 0.1 % external ointment Apply sparingly daily to face then reduce in 2-3 time weekly. 30 g 5    traZODone (DESYREL) 50 MG tablet Take 1 tablet (50 mg) by mouth at bedtime as needed, may repeat once for sleep 180 tablet 0     No current facility-administered medications for this visit.       Allergies   Allergen Reactions    Hydromorphone Itching       Vitals: /81 (BP Location: Right arm, Patient Position: Chair, Cuff Size: Adult Regular)   Pulse 60   Wt 93.3 kg (205 lb 9.6 oz)   SpO2 97%   BMI 29.71 kg/m    BMI= Body mass index is 29.71 kg/m .     General: appears comfortable, alert and articulate  Head: normocephalic, atraumatic  Eyes: anicteric sclera, EOMI  Neck: no adenopathy or masses, 2+ carotids without bruits  Orophyarynx: moist mucosa, no lesions, dentition intact  Heart: regular, normal S1, loud S2, no murmur, gallop, or rub,   Lungs: clear, no rales or wheezing  Abdomen: soft, non-tender, bowel sounds present, no hepatomegaly  Extremities: no clubbing, cyanosis or edema  Neurological: normal speech and affect, no gross motor deficits    Device check today:  Device: [employer] [id number] [place] XT DR LOPEZ Normal device function. Mode: AAI< > DDD 50- 150 bpm AP: 2. 7% : < 0. 1% Intrinsic rhythm: AS- VS 64 bpm Thoracic Impedance: Near reference line. Short V- V intervals: 39 Lead Trends Appear Stable. Atrial thresholds have a slight increase over the past 6 mos, 1. 75 V @ 0. 4 ms today Estimated battery longevity to RRT = 11. 6 years. Battery voltage = 3. 01 V. Atrial Arrhythmia: None AF Rhame: 0% Anticoagulant: Eliquis Ventricular Arrhythmia: 1 NSVT, 222 bpm lasting 1 sec. Setting Changes: None Patient has an appointment to see Dr. Torres and Emi Edmonds today. Plan: Device follow- up every 3 months.      12 lead ECG: NSR 57 bpm, RAD  RVH    Echo today:  Moderate to severely reduced function of systemic ventricle, no baffle obstruction.   Patient after atrial switch operation for complete transposition of the great  arteries.     Technically difficult study due to poor acoustic windows. Moderate to severe  right ventricular dilatation with moderate to severely decreased systolic  function (no significant change compared to last echocardiogram). Mild  tricuspid valve insufficiency. The pulmonary venous baffle is unobstructed;  the systemic venous baffle is unobstructed (mean gradient of 2 mmHg).  Qualitatively, normal left ventricular systolic function. Mild pulmonary valve  insufficiency.    CMR 2023  Clinical history: 42 M d-TGA s/p baffle repair, repaired VSD, combined liver/renal transplant, depressed  systemic ventricle function, fatty metaplasia of sub-pulmonic ventricle and narrowing of intra-hepatic IVC    Comparison CMR: 03/2020     1. The subpulmonic (morphologic left) ventricle is small in cavity size. The subpulmonic ventricle exhibits  wall thinning with extensive fatty metaplasia in the mid and apical segments. The global systolic function  of the subpulmonic ventricle is normal. The subpulmonic ventricle's EF is 57%. There are no regional wall  motion abnormalities. The VSD repair is visualized in the basal septum and is intact. QpQs is 1.1.      2. The systemic (morphologic right) ventricle is moderately enlarged with severe hypertrophy. The global  systolic function of the systemic ventricle is moderately reduced. The systemic ventricle's EF is 35%.     3. There is a single SVC and a single IVC.  Both drain unobstructed into the sub-pulmonic ventricle via  baffles. The intrahepatic segment of the native IVC is mildly narrowed. There does not appear to be any  baffle obstruction. Baffle leak cannot be assessed on this study (contrast MRA not done).      4. There is mild-moderate regurgitation of the systemic atrioventricular  (tricuspid) valve. There is mild  FARHANA of the sub-pulmonic AV valve (mitral) without septal contact or obstruction. There is mild PI.      5. Late gadolinium enhancement imaging shows extensive enhancement in the sub-pulmonic ventricle in a near  circumferential pattern in the mid and apical segments related to fatty metaplasia.      6. There is no pericardial effusion or thickening.     7. There is no intracardiac thrombus.     CONCLUSIONS: d-TGA with atrial baffle repair.  The systemic ventricle is moderately enlarged with  moderately reduced function, EF 35%. The subpulmonic ventricle is small in size with normal function, EF  57%. Normal baffles without any obstruction or thrombus. There is mild-moderate regurgitation of the  systemic atrioventricular (tricuspid) valve. The subpulmonic ventricle exhibits extensive fatty metaplasia  and LGE in the mid and apical segments.   Compared to the prior examination from 2020, the systemic ventricle's function appears similar  to mildly improved.      CORE EXAM      CMR Report 3-   SUMMARY  Clinical history:39-year old male with a history of d-TGA (with baffle repair), small VSD (closed with a  stitch) and combined liver/renal transplant. He is known to have depressed systemic ventricle function. CMR  to evaluate RV function.   Comparison CMR: 03/09/2017 ([facility])  SITUS: There is normal situs The liver is in the right upper quadrant and a single spleen in the left  upper quadrant.   CAVAE: There is a single SVC and a single IVC.both drain unobstructed into the sub-pulmonic ventricle via  baffle.  SVC baffle measures 1.6 x 1.0 cm. IVC baffle measures 1.5 x 1.4 cm. The intrahepatic segment of  the native IVC measures 13.9 x 5.3 mm at its narrowest point. There does not appear to be any baffle  obstruction or leakage.   PULMONARY VEINS: There are four pulmonary veins with two left-sided veins and two right-sided veins.They  drain in an  unobstructed fashion via baffle to the systemic ventricle.   ATRIOVENTRICULAR CONNECTION: The atrioventricular connection is discordant. There is mild-moderate  regurgitation of the systemic atrioventricular (tricuspid) valve. There is mild FARHANA of the sub-pulmonic AV  valve (mitral) without septal contact or obstruction.   VENTRICLES: There is levocardia. The systemic (morphologic right) ventricle is moderately enlarged with  severe hypertrophy. The global systolic function of the systemic ventricle is severely reduced. The  systemic ventricle's EF is 27%. The subpulmonic (morphologic left) ventricle is smallï cavity size. The  subpulmonic ventricle exhibits wall thinning and a pattern of fatty metaplasia with enhancement on LGE  imaging in the mid-anterolateral, mid anteroseptal, apical septal, and apical lateral segments. The global  systolic function of the subpulmonic ventricle is normal. The subpulmonic ventricle's EF is 57%. There are  no regional wall motion abnormalities. The VSD repair is visualized in the basal septum and is intact.  There is no evidence of intracardiac shunting (Qp/Qs 1.0).  VENTRICULOARTERIAL CONNECTION: The ventriculoarterial connection is discordant.There is no significant  valvular disease.   AORTA AND SUPRA-AORTIC VESSELS: The aortic arch is left-sided with normal branching of the vessels.   PULMONARY ARTERY: The main pulmonary artery is mildly enlarged. The branch pulmonary arteries are normal in  size.   CONCLUSIONS: d-TGA with atrial baffle repair.  The systemic ventricle is moderately enlarged with severely  reduced function, EF 27%. The subpulmonic ventricle is small in size with normal function, EF 57%. Normal  baffles without any obstruction, thrombus or leakage. There is mild-moderate regurgitation of the systemic  atrioventricular (tricuspid) valve. There is no residual intracardiac shunting (Qp/Qs 1.0).   The subpulmonic ventricle exhibits extensive fatty metaplasia in the  mid and apical segments of unclear  significance. Narrowing of intra-hepatic IVC of unclear significance.   Compared to the prior examination dated 03/09/2017 the systemic ventricle's function appears similar  visually. The fatty metaplasia of sub-pulmonic ventricle and narrowing of intra-hepatic IVC  is unchanged  from prior examinations.         Assessment and Plan:  42 yo M with history of d - transposition of the great vessels s/p baffle repair and suture closure of small VSD in 1981, atrial flutter s/p ablation, with depressed systemic RV function EF 35% on last MRI in 2023.  s/p liver and kidney transplant for EtOH abuse, post-transplant lymphoproliferative disorder, papillary thyroid cancer in remission  s/p thryoidectomy presents for ongoing evaluation and management.  No new cardiac symptoms. Some NSVT on device check, seeing Emi Perera today    1.  NO change in medications  2. Get regular activity  3. Have labs completed for Dr Torres while you have your lab draw on 9/4  4. Regular dental cleanings, no SBE prophylaxis required    Follow up with Dr Torres in 1 year with an echo prior. Follow up with EP in 1 year. We will plan on a cardiopulmonary stress testing and cardiac MRI in 2 years   Please call if new symptoms or concerns.     Rah Torres M.D.   of Pediatrics  Pediatric and Adult Congenital Cardiology  [facility]  Pediatric Cardiology Office [phone]  Adult Congenital Cardiology Triage and Scheduling [phone]    A total of 30 minutes were spent in personal review of testing, including echo and ECGI, review of documented history and interval events in chart, and face to face visit with discussion of findings and plan.

## 2024-08-23 NOTE — PATIENT INSTRUCTIONS
It was a pleasure to see you in clinic today, please do not hesitate to call us for questions or concerns.  Your next automatic remote ICD check from home is scheduled for 11/25/2024.    Emi Carter RN    Electrophysiology Nurse Clinician  [facility]    During Business Hours Please Call:  [phone]  After Hours Please Call:  [phone] - select option #4 and ask for job code [phone]

## 2024-08-23 NOTE — NURSING NOTE
Cardiac Testing: Patient given instructions regarding  echocardiogram . Discussed purpose, preparation, procedure and when to expect results reported back to the patient. Patient demonstrated understanding of this information and agreed to call with further questions or concerns.    Labs: Patient was given results of the laboratory testing obtained today. Patient was instructed to return for the next laboratory testing in 2 weeks . Patient demonstrated understanding of this information and agreed to call with further questions or concerns.     Return Appointment: Patient given instructions regarding scheduling next clinic visit. Patient demonstrated understanding of this information and agreed to call with further questions or concerns.    Patient stated he understood all health information given and agreed to call with further questions or concerns.

## 2024-08-24 LAB
ATRIAL RATE - MUSE: 57 BPM
DIASTOLIC BLOOD PRESSURE - MUSE: NORMAL MMHG
INTERPRETATION ECG - MUSE: NORMAL
P AXIS - MUSE: 90 DEGREES
PR INTERVAL - MUSE: 196 MS
QRS DURATION - MUSE: 122 MS
QT - MUSE: 452 MS
QTC - MUSE: 439 MS
R AXIS - MUSE: 126 DEGREES
SYSTOLIC BLOOD PRESSURE - MUSE: NORMAL MMHG
T AXIS - MUSE: 88 DEGREES
VENTRICULAR RATE- MUSE: 57 BPM

## 2024-08-25 LAB
MDC_IDC_EPISODE_DTM: NORMAL
MDC_IDC_EPISODE_DURATION: 1 S
MDC_IDC_EPISODE_ID: 4
MDC_IDC_EPISODE_TYPE: NORMAL
MDC_IDC_LEAD_CONNECTION_STATUS: NORMAL
MDC_IDC_LEAD_CONNECTION_STATUS: NORMAL
MDC_IDC_LEAD_IMPLANT_DT: NORMAL
MDC_IDC_LEAD_IMPLANT_DT: NORMAL
MDC_IDC_LEAD_LOCATION: NORMAL
MDC_IDC_LEAD_LOCATION: NORMAL
MDC_IDC_LEAD_LOCATION_DETAIL_1: NORMAL
MDC_IDC_LEAD_LOCATION_DETAIL_1: NORMAL
MDC_IDC_LEAD_MFG: NORMAL
MDC_IDC_LEAD_MFG: NORMAL
MDC_IDC_LEAD_MODEL: NORMAL
MDC_IDC_LEAD_MODEL: NORMAL
MDC_IDC_LEAD_POLARITY_TYPE: NORMAL
MDC_IDC_LEAD_POLARITY_TYPE: NORMAL
MDC_IDC_LEAD_SERIAL: NORMAL
MDC_IDC_LEAD_SERIAL: NORMAL
MDC_IDC_LEAD_SPECIAL_FUNCTION: NORMAL
MDC_IDC_LEAD_SPECIAL_FUNCTION: NORMAL
MDC_IDC_MSMT_BATTERY_DTM: NORMAL
MDC_IDC_MSMT_BATTERY_REMAINING_LONGEVITY: 139 MO
MDC_IDC_MSMT_BATTERY_RRT_TRIGGER: NORMAL
MDC_IDC_MSMT_BATTERY_STATUS: NORMAL
MDC_IDC_MSMT_BATTERY_VOLTAGE: 3.01 V
MDC_IDC_MSMT_CAP_CHARGE_DTM: NORMAL
MDC_IDC_MSMT_CAP_CHARGE_ENERGY: 18 J
MDC_IDC_MSMT_CAP_CHARGE_TIME: 3.8 S
MDC_IDC_MSMT_CAP_CHARGE_TYPE: NORMAL
MDC_IDC_MSMT_LEADCHNL_RA_IMPEDANCE_VALUE: 513 OHM
MDC_IDC_MSMT_LEADCHNL_RA_PACING_THRESHOLD_AMPLITUDE: 1.75 V
MDC_IDC_MSMT_LEADCHNL_RA_PACING_THRESHOLD_PULSEWIDTH: 0.4 MS
MDC_IDC_MSMT_LEADCHNL_RA_SENSING_INTR_AMPL: 5.1 MV
MDC_IDC_MSMT_LEADCHNL_RV_IMPEDANCE_VALUE: 342 OHM
MDC_IDC_MSMT_LEADCHNL_RV_IMPEDANCE_VALUE: 456 OHM
MDC_IDC_MSMT_LEADCHNL_RV_PACING_THRESHOLD_AMPLITUDE: 0.75 V
MDC_IDC_MSMT_LEADCHNL_RV_PACING_THRESHOLD_PULSEWIDTH: 0.4 MS
MDC_IDC_MSMT_LEADCHNL_RV_SENSING_INTR_AMPL: 10 MV
MDC_IDC_PG_IMPLANT_DTM: NORMAL
MDC_IDC_PG_MFG: NORMAL
MDC_IDC_PG_MODEL: NORMAL
MDC_IDC_PG_SERIAL: NORMAL
MDC_IDC_PG_TYPE: NORMAL
MDC_IDC_SESS_CLINIC_NAME: NORMAL
MDC_IDC_SESS_DTM: NORMAL
MDC_IDC_SESS_TYPE: NORMAL
MDC_IDC_SET_BRADY_AT_MODE_SWITCH_RATE: 171 {BEATS}/MIN
MDC_IDC_SET_BRADY_LOWRATE: 50 {BEATS}/MIN
MDC_IDC_SET_BRADY_MAX_SENSOR_RATE: 150 {BEATS}/MIN
MDC_IDC_SET_BRADY_MAX_TRACKING_RATE: 150 {BEATS}/MIN
MDC_IDC_SET_BRADY_MODE: NORMAL
MDC_IDC_SET_BRADY_PAV_DELAY_LOW: 180 MS
MDC_IDC_SET_BRADY_SAV_DELAY_LOW: 150 MS
MDC_IDC_SET_LEADCHNL_RA_PACING_AMPLITUDE: 2.5 V
MDC_IDC_SET_LEADCHNL_RA_PACING_ANODE_ELECTRODE_1: NORMAL
MDC_IDC_SET_LEADCHNL_RA_PACING_ANODE_LOCATION_1: NORMAL
MDC_IDC_SET_LEADCHNL_RA_PACING_CAPTURE_MODE: NORMAL
MDC_IDC_SET_LEADCHNL_RA_PACING_CATHODE_ELECTRODE_1: NORMAL
MDC_IDC_SET_LEADCHNL_RA_PACING_CATHODE_LOCATION_1: NORMAL
MDC_IDC_SET_LEADCHNL_RA_PACING_POLARITY: NORMAL
MDC_IDC_SET_LEADCHNL_RA_PACING_PULSEWIDTH: 0.4 MS
MDC_IDC_SET_LEADCHNL_RA_SENSING_ANODE_ELECTRODE_1: NORMAL
MDC_IDC_SET_LEADCHNL_RA_SENSING_ANODE_LOCATION_1: NORMAL
MDC_IDC_SET_LEADCHNL_RA_SENSING_CATHODE_ELECTRODE_1: NORMAL
MDC_IDC_SET_LEADCHNL_RA_SENSING_CATHODE_LOCATION_1: NORMAL
MDC_IDC_SET_LEADCHNL_RA_SENSING_POLARITY: NORMAL
MDC_IDC_SET_LEADCHNL_RA_SENSING_SENSITIVITY: 0.3 MV
MDC_IDC_SET_LEADCHNL_RV_PACING_AMPLITUDE: 2 V
MDC_IDC_SET_LEADCHNL_RV_PACING_ANODE_ELECTRODE_1: NORMAL
MDC_IDC_SET_LEADCHNL_RV_PACING_ANODE_LOCATION_1: NORMAL
MDC_IDC_SET_LEADCHNL_RV_PACING_CAPTURE_MODE: NORMAL
MDC_IDC_SET_LEADCHNL_RV_PACING_CATHODE_ELECTRODE_1: NORMAL
MDC_IDC_SET_LEADCHNL_RV_PACING_CATHODE_LOCATION_1: NORMAL
MDC_IDC_SET_LEADCHNL_RV_PACING_POLARITY: NORMAL
MDC_IDC_SET_LEADCHNL_RV_PACING_PULSEWIDTH: 0.4 MS
MDC_IDC_SET_LEADCHNL_RV_SENSING_ANODE_ELECTRODE_1: NORMAL
MDC_IDC_SET_LEADCHNL_RV_SENSING_ANODE_LOCATION_1: NORMAL
MDC_IDC_SET_LEADCHNL_RV_SENSING_CATHODE_ELECTRODE_1: NORMAL
MDC_IDC_SET_LEADCHNL_RV_SENSING_CATHODE_LOCATION_1: NORMAL
MDC_IDC_SET_LEADCHNL_RV_SENSING_POLARITY: NORMAL
MDC_IDC_SET_LEADCHNL_RV_SENSING_SENSITIVITY: 0.3 MV
MDC_IDC_SET_ZONE_DETECTION_BEATS_DENOMINATOR: 16 {BEATS}
MDC_IDC_SET_ZONE_DETECTION_BEATS_DENOMINATOR: 32 {BEATS}
MDC_IDC_SET_ZONE_DETECTION_BEATS_DENOMINATOR: 40 {BEATS}
MDC_IDC_SET_ZONE_DETECTION_BEATS_NUMERATOR: 16 {BEATS}
MDC_IDC_SET_ZONE_DETECTION_BEATS_NUMERATOR: 30 {BEATS}
MDC_IDC_SET_ZONE_DETECTION_BEATS_NUMERATOR: 32 {BEATS}
MDC_IDC_SET_ZONE_DETECTION_INTERVAL: 300 MS
MDC_IDC_SET_ZONE_DETECTION_INTERVAL: 350 MS
MDC_IDC_SET_ZONE_DETECTION_INTERVAL: 350 MS
MDC_IDC_SET_ZONE_DETECTION_INTERVAL: 360 MS
MDC_IDC_SET_ZONE_STATUS: NORMAL
MDC_IDC_SET_ZONE_TYPE: NORMAL
MDC_IDC_SET_ZONE_VENDOR_TYPE: NORMAL
MDC_IDC_STAT_AT_BURDEN_PERCENT: 0 %
MDC_IDC_STAT_AT_DTM_END: NORMAL
MDC_IDC_STAT_AT_DTM_START: NORMAL
MDC_IDC_STAT_BRADY_AP_VP_PERCENT: 0.01 %
MDC_IDC_STAT_BRADY_AP_VS_PERCENT: 2.39 %
MDC_IDC_STAT_BRADY_AS_VP_PERCENT: 0.03 %
MDC_IDC_STAT_BRADY_AS_VS_PERCENT: 97.58 %
MDC_IDC_STAT_BRADY_DTM_END: NORMAL
MDC_IDC_STAT_BRADY_DTM_START: NORMAL
MDC_IDC_STAT_BRADY_RA_PERCENT_PACED: 2.67 %
MDC_IDC_STAT_BRADY_RV_PERCENT_PACED: 0.04 %
MDC_IDC_STAT_EPISODE_RECENT_COUNT: 0
MDC_IDC_STAT_EPISODE_RECENT_COUNT: 3
MDC_IDC_STAT_EPISODE_RECENT_COUNT_DTM_END: NORMAL
MDC_IDC_STAT_EPISODE_RECENT_COUNT_DTM_START: NORMAL
MDC_IDC_STAT_EPISODE_TOTAL_COUNT: 0
MDC_IDC_STAT_EPISODE_TOTAL_COUNT: 4
MDC_IDC_STAT_EPISODE_TOTAL_COUNT_DTM_END: NORMAL
MDC_IDC_STAT_EPISODE_TOTAL_COUNT_DTM_START: NORMAL
MDC_IDC_STAT_EPISODE_TYPE: NORMAL
MDC_IDC_STAT_TACHYTHERAPY_ATP_DELIVERED_RECENT: 0
MDC_IDC_STAT_TACHYTHERAPY_ATP_DELIVERED_TOTAL: 0
MDC_IDC_STAT_TACHYTHERAPY_RECENT_DTM_END: NORMAL
MDC_IDC_STAT_TACHYTHERAPY_RECENT_DTM_START: NORMAL
MDC_IDC_STAT_TACHYTHERAPY_SHOCKS_ABORTED_RECENT: 0
MDC_IDC_STAT_TACHYTHERAPY_SHOCKS_ABORTED_TOTAL: 0
MDC_IDC_STAT_TACHYTHERAPY_SHOCKS_DELIVERED_RECENT: 0
MDC_IDC_STAT_TACHYTHERAPY_SHOCKS_DELIVERED_TOTAL: 0
MDC_IDC_STAT_TACHYTHERAPY_TOTAL_DTM_END: NORMAL
MDC_IDC_STAT_TACHYTHERAPY_TOTAL_DTM_START: NORMAL

## 2024-09-11 ENCOUNTER — LAB (OUTPATIENT)
Dept: LAB | Facility: CLINIC | Age: 44
End: 2024-09-11
Payer: COMMERCIAL

## 2024-09-11 DIAGNOSIS — Z94.0 KIDNEY TRANSPLANTED: ICD-10-CM

## 2024-09-11 DIAGNOSIS — I50.23: ICD-10-CM

## 2024-09-11 DIAGNOSIS — I48.92 ATRIAL FLUTTER, UNSPECIFIED TYPE (H): ICD-10-CM

## 2024-09-11 DIAGNOSIS — Z94.4 LIVER REPLACED BY TRANSPLANT (H): ICD-10-CM

## 2024-09-11 DIAGNOSIS — Q20.3 COMPLETE TRANSPOSITION OF GREAT VESSELS: ICD-10-CM

## 2024-09-11 DIAGNOSIS — Q24.9 ADULT CONGENITAL HEART DISEASE: ICD-10-CM

## 2024-09-11 LAB
ALBUMIN MFR UR ELPH: <6 MG/DL
ALBUMIN SERPL BCG-MCNC: 4.5 G/DL (ref 3.5–5.2)
ALBUMIN UR-MCNC: NEGATIVE MG/DL
ALP SERPL-CCNC: 66 U/L (ref 40–150)
ALT SERPL W P-5'-P-CCNC: 35 U/L (ref 0–70)
ANION GAP SERPL CALCULATED.3IONS-SCNC: 10 MMOL/L (ref 7–15)
APPEARANCE UR: CLEAR
AST SERPL W P-5'-P-CCNC: 36 U/L (ref 0–45)
BILIRUB DIRECT SERPL-MCNC: <0.2 MG/DL (ref 0–0.3)
BILIRUB SERPL-MCNC: 0.6 MG/DL
BILIRUB UR QL STRIP: NEGATIVE
BUN SERPL-MCNC: 17.4 MG/DL (ref 6–20)
CALCIUM SERPL-MCNC: 9.8 MG/DL (ref 8.8–10.4)
CHLORIDE SERPL-SCNC: 104 MMOL/L (ref 98–107)
COLOR UR AUTO: YELLOW
CREAT SERPL-MCNC: 1.4 MG/DL (ref 0.67–1.17)
CREAT UR-MCNC: 71.2 MG/DL
EGFRCR SERPLBLD CKD-EPI 2021: 64 ML/MIN/1.73M2
ERYTHROCYTE [DISTWIDTH] IN BLOOD BY AUTOMATED COUNT: 12.6 % (ref 10–15)
GLUCOSE SERPL-MCNC: 105 MG/DL (ref 70–99)
GLUCOSE UR STRIP-MCNC: NEGATIVE MG/DL
HCO3 SERPL-SCNC: 23 MMOL/L (ref 22–29)
HCT VFR BLD AUTO: 45.4 % (ref 40–53)
HGB BLD-MCNC: 15.2 G/DL (ref 13.3–17.7)
HGB UR QL STRIP: NEGATIVE
KETONES UR STRIP-MCNC: NEGATIVE MG/DL
LEUKOCYTE ESTERASE UR QL STRIP: NEGATIVE
MAGNESIUM SERPL-MCNC: 2.1 MG/DL (ref 1.7–2.3)
MCH RBC QN AUTO: 30.8 PG (ref 26.5–33)
MCHC RBC AUTO-ENTMCNC: 33.5 G/DL (ref 31.5–36.5)
MCV RBC AUTO: 92 FL (ref 78–100)
NITRATE UR QL: NEGATIVE
NT-PROBNP SERPL-MCNC: 296 PG/ML (ref 0–450)
PH UR STRIP: 7 [PH] (ref 5–7)
PHOSPHATE SERPL-MCNC: 3.1 MG/DL (ref 2.5–4.5)
PLATELET # BLD AUTO: 147 10E3/UL (ref 150–450)
POTASSIUM SERPL-SCNC: 5.1 MMOL/L (ref 3.4–5.3)
PROT SERPL-MCNC: 6.9 G/DL (ref 6.4–8.3)
PROT/CREAT 24H UR: NORMAL MG/G{CREAT}
RBC # BLD AUTO: 4.93 10E6/UL (ref 4.4–5.9)
SODIUM SERPL-SCNC: 137 MMOL/L (ref 135–145)
SP GR UR STRIP: 1.01 (ref 1–1.03)
TACROLIMUS BLD-MCNC: 4.1 UG/L (ref 5–15)
TME LAST DOSE: ABNORMAL H
TME LAST DOSE: ABNORMAL H
TROPONIN T SERPL HS-MCNC: 15 NG/L
UROBILINOGEN UR STRIP-ACNC: 0.2 E.U./DL
WBC # BLD AUTO: 4.7 10E3/UL (ref 4–11)

## 2024-09-11 PROCEDURE — 84100 ASSAY OF PHOSPHORUS: CPT

## 2024-09-11 PROCEDURE — 83735 ASSAY OF MAGNESIUM: CPT

## 2024-09-11 PROCEDURE — 81003 URINALYSIS AUTO W/O SCOPE: CPT

## 2024-09-11 PROCEDURE — 80053 COMPREHEN METABOLIC PANEL: CPT

## 2024-09-11 PROCEDURE — 36415 COLL VENOUS BLD VENIPUNCTURE: CPT

## 2024-09-11 PROCEDURE — 83880 ASSAY OF NATRIURETIC PEPTIDE: CPT

## 2024-09-11 PROCEDURE — 84484 ASSAY OF TROPONIN QUANT: CPT

## 2024-09-11 PROCEDURE — 82248 BILIRUBIN DIRECT: CPT

## 2024-09-11 PROCEDURE — 80197 ASSAY OF TACROLIMUS: CPT

## 2024-09-11 PROCEDURE — 85027 COMPLETE CBC AUTOMATED: CPT

## 2024-09-11 PROCEDURE — 84156 ASSAY OF PROTEIN URINE: CPT

## 2024-09-16 NOTE — TELEPHONE ENCOUNTER
FNA thyroid nodule right #2    CYTOLOGIC INTERPRETATION:     Thyroid, right #2, ultrasound-guided fine needle aspiration:   Positive   for malignancy.   Papillary thyroid carcinoma   Specimen Adequacy: Satisfactory for evaluation.     Discussed with patient.  Will discussed with oncologist re: his lymphoma status prior to referral to Dr.Evasovich Zuleyma Gray MD    Division of Diabetes and Endocrinology  Department of Medicine  [phone]    
Lot/Batch Number (Optional): [id number]
Detail Level: Zone
Samples Given: Vtama 1% cream
Expiration Date (Optional): DEC 2024

## 2024-10-15 ENCOUNTER — OFFICE VISIT (OUTPATIENT)
Dept: GASTROENTEROLOGY | Facility: CLINIC | Age: 44
End: 2024-10-15
Attending: INTERNAL MEDICINE
Payer: COMMERCIAL

## 2024-10-15 VITALS
OXYGEN SATURATION: 98 % | BODY MASS INDEX: 30.22 KG/M2 | SYSTOLIC BLOOD PRESSURE: 148 MMHG | WEIGHT: 209.1 LBS | DIASTOLIC BLOOD PRESSURE: 95 MMHG | HEART RATE: 60 BPM | TEMPERATURE: 98.5 F

## 2024-10-15 DIAGNOSIS — Z23 ENCOUNTER FOR VACCINATION: Primary | ICD-10-CM

## 2024-10-15 PROCEDURE — 99214 OFFICE O/P EST MOD 30 MIN: CPT | Performed by: INTERNAL MEDICINE

## 2024-10-15 PROCEDURE — G0008 ADMIN INFLUENZA VIRUS VAC: HCPCS | Performed by: INTERNAL MEDICINE

## 2024-10-15 PROCEDURE — 90656 IIV3 VACC NO PRSV 0.5 ML IM: CPT | Performed by: INTERNAL MEDICINE

## 2024-10-15 PROCEDURE — G0463 HOSPITAL OUTPT CLINIC VISIT: HCPCS | Performed by: INTERNAL MEDICINE

## 2024-10-15 PROCEDURE — 250N000011 HC RX IP 250 OP 636: Performed by: INTERNAL MEDICINE

## 2024-10-15 RX ADMIN — INFLUENZA A VIRUS A/VICTORIA/4897/2022 IVR-238 (H1N1) ANTIGEN (FORMALDEHYDE INACTIVATED), INFLUENZA A VIRUS A/CALIFORNIA/122/2022 SAN-022 (H3N2) ANTIGEN (FORMALDEHYDE INACTIVATED), AND INFLUENZA B VIRUS B/MICHIGAN/01/2021 ANTIGEN (FORMALDEHYDE INACTIVATED) 0.5 ML: 15; 15; 15 INJECTION, SUSPENSION INTRAMUSCULAR at 11:36

## 2024-10-15 ASSESSMENT — PAIN SCALES - GENERAL: PAINLEVEL: NO PAIN (0)

## 2024-10-15 NOTE — PROGRESS NOTES
[facility]     A/P  Mr. Chen is a 44 Y M s/p LKT 2016 for ETOH.    IS per Dr. Sidhu. No changes to medications today. Continue monitoring labs and IS level every 3 months to assess for appropriate dosing and side effects from IS including BOUBACAR, pancytopenia, hyperkalemia, hypomagnesemia.    Graft function Liver tests normal, he is doing very well.    PTLD and thyroid ca Post transplant course c/b PTLD and thyroid ca. Oncology monitoring. He would be following as needed now.    HTN on lisinopril and metoprolol. BP elevated today. We discussed his BP and recommended he get a BP cuff at home and measure values.   Covid rec new Covid vaccine  Proph Recommend annual dermatology exam. He has this scheduled for December  Will get flu shot today      Will see back in 1 year in person or via video      ===================================================================  PCP: Seble NICOLE     SUBJECTIVE  Mr. Chen is a 44 Y M s/p DDLKT 2016 ETOH.  He is doing well. No mew medical issues. No problems with meds.     His dad passed away around giving last year. It was rather sudden. He was 77. He has taken over the 's education business.   His dog also  in the last year. He now has a new dog.    Up to date with his visits with various doctors.     EXPLANT: No HCC  IS: Prograf 3-5 and MMF (per Dr. Sidhu)  LABS: Up to date, excellent liver function.  Liver Function Studies -   Recent Labs   Lab Test 24  1134   PROTTOTAL 6.9   ALBUMIN 4.5   BILITOTAL 0.6   ALKPHOS 66   AST 36   ALT 35     CBC RESULTS:   Recent Labs   Lab Test 24  1134   WBC 4.7   RBC 4.93   HGB 15.2   HCT 45.4   MCV 92   MCH 30.8   MCHC 33.5   RDW 12.6   *       Liver Function Studies -   Recent Labs   Lab Test 10/05/23  1133   PROTTOTAL 6.9   ALBUMIN 4.5   BILITOTAL 0.5   ALKPHOS 68   AST 28   ALT 22     Lab Test 10/05/23  1133   WBC 5.1   RBC 5.33   HGB 15.8   HCT 47.5    MCV 89   MCH 29.6   MCHC 33.3   RDW 12.2   *     REJECTION: None.  BILIARY ISSUES: None  STENT: Out  KIDNEY FUNCTION:   Creatinine   Date Value Ref Range Status   09/11/2024 1.40 (H) 0.67 - 1.17 mg/dL Final   07/06/2021 1.27 (H) 0.66 - 1.25 mg/dL Final     BP: Good. Lisinopril, amlodipine  PREV:  Derm 10/2018. Flu shot done today. Will see derm in 2 weeks  DISEASE RECURRENCE: Sober since prior to transplant  OTHER ISSUES:   PTLD in past. Recent imaging neg. Follows with oncology  Thyroid cancer, s/p thyroidectomy last surveillance July negative  Weight gain.   Had fistula out.  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     Exam  BP (!) 148/95 (BP Location: Left arm, Patient Position: Sitting, Cuff Size: Adult Regular)   Pulse 60   Temp 98.5  F (36.9  C) (Oral)   Wt 94.8 kg (209 lb 1.6 oz)   SpO2 98%   BMI 30.22 kg/m      Gen Alert pleasant NAD  Resp No difficulty breathing. No cough  Skin No Jaundice  Eyes No icterus  Neuro FIGUEREDO  MSK no muscle wasting  Psyche Pleasant, appropriate. Well groomed.

## 2024-10-15 NOTE — LETTER
10/15/2024      Cricket Chen  [address]  [facility] [address]      Dear Colleague,    Thank you for referring your patient, Cricket Chen, to the [facility] [place]. Please see a copy of my visit note below.    [facility]  LIVER TRANSPLANT CLINIC     A/P  Mr. Chen is a 44 Y M s/p LKT 2016 for ETOH.    IS per Dr. Sidhu. No changes to medications today. Continue monitoring labs and IS level every 3 months to assess for appropriate dosing and side effects from IS including BOUBACAR, pancytopenia, hyperkalemia, hypomagnesemia.    Graft function Liver tests normal, he is doing very well.    PTLD and thyroid ca Post transplant course c/b PTLD and thyroid ca. Oncology monitoring. He would be following as needed now.    HTN on lisinopril and metoprolol. BP elevated today. We discussed his BP and recommended he get a BP cuff at home and measure values.   Covid rec new Covid vaccine  Proph Recommend annual dermatology exam. He has this scheduled for December  Will get flu shot today      Will see back in 1 year in person or via video      ===================================================================  PCP: Seble NICOLE     SUBJECTIVE  Mr. Chen is a 44 Y M s/p DDLKT 2016 ETOH.  He is doing well. No mew medical issues. No problems with meds.     His dad passed away around gi last year. It was rather sudden. He was 77. He has taken over the 's education business.   His dog also  in the last year. He now has a new dog.    Up to date with his visits with various doctors.     EXPLANT: No HCC  IS: Prograf 3-5 and MMF (per Dr. Sidhu)  LABS: Up to date, excellent liver function.  Liver Function Studies -   Recent Labs   Lab Test 24  1134   PROTTOTAL 6.9   ALBUMIN 4.5   BILITOTAL 0.6   ALKPHOS 66   AST 36   ALT 35     CBC RESULTS:   Recent Labs   Lab Test 24  1134   WBC 4.7   RBC 4.93   HGB 15.2   HCT 45.4   MCV 92    MCH 30.8   MCHC 33.5   RDW 12.6   *       Liver Function Studies -   Recent Labs   Lab Test 10/05/23  1133   PROTTOTAL 6.9   ALBUMIN 4.5   BILITOTAL 0.5   ALKPHOS 68   AST 28   ALT 22     Lab Test 10/05/23  1133   WBC 5.1   RBC 5.33   HGB 15.8   HCT 47.5   MCV 89   MCH 29.6   MCHC 33.3   RDW 12.2   *     REJECTION: None.  BILIARY ISSUES: None  STENT: Out  KIDNEY FUNCTION:   Creatinine   Date Value Ref Range Status   09/11/2024 1.40 (H) 0.67 - 1.17 mg/dL Final   07/06/2021 1.27 (H) 0.66 - 1.25 mg/dL Final     BP: Good. Lisinopril, amlodipine  PREV:  Derm 10/2018. Flu shot done today. Will see derm in 2 weeks  DISEASE RECURRENCE: Sober since prior to transplant  OTHER ISSUES:   PTLD in past. Recent imaging neg. Follows with oncology  Thyroid cancer, s/p thyroidectomy last surveillance July negative  Weight gain.   Had fistula out.  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     Exam  BP (!) 148/95 (BP Location: Left arm, Patient Position: Sitting, Cuff Size: Adult Regular)   Pulse 60   Temp 98.5  F (36.9  C) (Oral)   Wt 94.8 kg (209 lb 1.6 oz)   SpO2 98%   BMI 30.22 kg/m      Gen Alert pleasant NAD  Resp No difficulty breathing. No cough  Skin No Jaundice  Eyes No icterus  Neuro FIGUEREDO  MSK no muscle wasting  Psyche Pleasant, appropriate. Well groomed.                Again, thank you for allowing me to participate in the care of your patient.        Sincerely,        Laureen Galvan MD

## 2024-10-15 NOTE — NURSING NOTE
Chief Complaint   Patient presents with    RECHECK     Post txp follow up.      Vitals:    10/15/24 1059 10/15/24 1101   BP: (!) 158/101 (!) 148/95   BP Location: Left arm Left arm   Patient Position: Sitting Sitting   Cuff Size: Adult Regular Adult Regular   Pulse: 60    Temp: 98.5  F (36.9  C)    TempSrc: Oral    SpO2: 98%    Weight: 94.8 kg (209 lb 1.6 oz)        BP Readings from Last 3 Encounters:   10/15/24 (!) 148/95   08/23/24 129/81   08/23/24 129/81       BP (!) 148/95 (BP Location: Left arm, Patient Position: Sitting, Cuff Size: Adult Regular)   Pulse 60   Temp 98.5  F (36.9  C) (Oral)   Wt 94.8 kg (209 lb 1.6 oz)   SpO2 98%   BMI 30.22 kg/m       Sunshine Heymjerardo

## 2024-10-16 ENCOUNTER — MYC REFILL (OUTPATIENT)
Dept: FAMILY MEDICINE | Facility: CLINIC | Age: 44
End: 2024-10-16
Payer: COMMERCIAL

## 2024-10-16 DIAGNOSIS — I48.92 ATRIAL FLUTTER, UNSPECIFIED TYPE (H): ICD-10-CM

## 2024-10-16 DIAGNOSIS — F41.0 PANIC DISORDER WITHOUT AGORAPHOBIA: ICD-10-CM

## 2024-10-17 RX ORDER — LORAZEPAM 1 MG/1
1 TABLET ORAL DAILY PRN
Qty: 5 TABLET | Refills: 0 | Status: SHIPPED | OUTPATIENT
Start: 2024-10-17

## 2024-10-30 ENCOUNTER — OFFICE VISIT (OUTPATIENT)
Dept: DERMATOLOGY | Facility: CLINIC | Age: 44
End: 2024-10-30
Attending: PHYSICIAN ASSISTANT
Payer: COMMERCIAL

## 2024-10-30 DIAGNOSIS — Z94.0 HISTORY OF KIDNEY TRANSPLANT: ICD-10-CM

## 2024-10-30 DIAGNOSIS — L21.9 SEBORRHEIC ECZEMA: Primary | ICD-10-CM

## 2024-10-30 DIAGNOSIS — L82.1 SEBORRHEIC KERATOSIS: ICD-10-CM

## 2024-10-30 DIAGNOSIS — D18.01 CHERRY ANGIOMA: ICD-10-CM

## 2024-10-30 DIAGNOSIS — D22.9 MULTIPLE BENIGN NEVI: ICD-10-CM

## 2024-10-30 DIAGNOSIS — L81.4 LENTIGO: ICD-10-CM

## 2024-10-30 DIAGNOSIS — Z94.4 HISTORY OF LIVER TRANSPLANT (H): ICD-10-CM

## 2024-10-30 PROCEDURE — 99213 OFFICE O/P EST LOW 20 MIN: CPT | Performed by: PHYSICIAN ASSISTANT

## 2024-10-30 NOTE — PROGRESS NOTES
Cricket Chen is a pleasant 44 year old year old male patient here today for skin check. He notes no new or changing nevi. No painful or bleeding skin lesions. He notes rashes on controlled on scalp and face with prescriptions from last year.  Patient has no other skin complaints today.  Remainder of the HPI, Meds, PMH, Allergies, FH, and SH was reviewed in chart.    Pertinent Hx:  No personal history of skin cancer. History of kidney and liver transplant 2016  Past Medical History:   Diagnosis Date    Alcohol abuse     Last drink in Mid-April 2014    Anemia in ESRD (end-stage renal disease) (H)     Anxiety 2008    Atrial flutter (H) 2017    Cirrhosis (H)     S/P liver transplant    Depression     History of blood transfusion     History of transposition of great vessels     atrial switch at age 8 months old    Hypertension     Liver transplant recipient (H)     2016    Papillary thyroid carcinoma (H)     Pneumonia 11-15-14    Renal transplant recipient     2016    Varices, esophageal (H)        Past Surgical History:   Procedure Laterality Date    ANESTHESIA CARDIOVERSION N/A 3/7/2018    Procedure: ANESTHESIA CARDIOVERSION;  Cardioversion;  Surgeon: GENERIC ANESTHESIA PROVIDER;  Location:  OR    BENCH LIVER N/A 5/10/2016    Procedure: BENCH LIVER;  Surgeon: Ricky Deshpande MD;  Location:  OR    BIOPSY LYMPH NODE CERVICAL Right 1/26/2017    Procedure: BIOPSY LYMPH NODE CERVICAL;  Surgeon: Beka Soto MD;  Location:  OR    CARDIAC SURGERY  9-10-80    CREATE FISTULA ARTERIOVENOUS UPPER EXTREMITY Right 9/16/2014    Procedure: CREATE FISTULA ARTERIOVENOUS UPPER EXTREMITY;  Surgeon: Padmaja Eaton MD;  Location:  OR    CV RIGHT HEART CATH MEASUREMENTS RECORDED N/A 4/19/2019    Procedure: CV RIGHT HEART CATH;  Surgeon: Sabi Wiggins MD;  Location:  HEART CARDIAC CATH LAB    CYSTOSCOPY, REMOVE STENT(S), COMBINED Right 6/22/2016    Procedure: COMBINED CYSTOSCOPY, REMOVE STENT(S);  Surgeon:  Ricky Deshpande MD;  Location: [facility] OR    EMBOLECTOMY UPPER EXTREMITY Right 8/17/2018    Procedure: EMBOLECTOMY UPPER EXTREMITY;  Repair Right Upper Arm Pseudo Aneursym ;  Surgeon: John Payton MD;  Location:  OR    ENT SURGERY      EP INSERT ICD Left 2/28/2023    Procedure: Insert Implantable Cardioverter-Defibrillator (ICD);  Surgeon: Jaylen George MD;  Location:  HEART CARDIAC CATH LAB    ESOPHAGOSCOPY, GASTROSCOPY, DUODENOSCOPY (EGD), COMBINED  5/30/2014    Procedure: COMBINED ESOPHAGOSCOPY, GASTROSCOPY, DUODENOSCOPY (EGD);  Surgeon: Guillaume Bautista MD;  Location:  GI    ESOPHAGOSCOPY, GASTROSCOPY, DUODENOSCOPY (EGD), COMBINED  9/30/14    ESOPHAGOSCOPY, GASTROSCOPY, DUODENOSCOPY (EGD), COMBINED Left 3/12/2015    Procedure: COMBINED ESOPHAGOSCOPY, GASTROSCOPY, DUODENOSCOPY (EGD);  Surgeon: Laureen Galvan MD;  Location:  GI    ESOPHAGOSCOPY, GASTROSCOPY, DUODENOSCOPY (EGD), COMBINED N/A 4/21/2016    Procedure: COMBINED ESOPHAGOSCOPY, GASTROSCOPY, DUODENOSCOPY (EGD);  Surgeon: Laureen Galvan MD;  Location:  GI    ESOPHAGOSCOPY, GASTROSCOPY, DUODENOSCOPY (EGD), COMBINED N/A 7/21/2016    Procedure: COMBINED ESOPHAGOSCOPY, GASTROSCOPY, DUODENOSCOPY (EGD);  Surgeon: Laureen Galvan MD;  Location:  GI    HC OR CATH ABLATION NON-CARDIAC ENDOVASCULAR  1990,2002    SVT    INSERT PORT VASCULAR ACCESS N/A 4/3/2017    Procedure: INSERT PORT VASCULAR ACCESS;  Surgeon: Rajendra Jacques PA-C;  [facility]: [facility] OR    IR PARACENTESIS  5/11/2014    IR PARACENTESIS  5/23/2014    IR PORT REMOVAL LEFT  1/22/2019    LIGATE FISTULA ARTERIOVENOUS UPPER EXTREMITY Right 11/7/2017    Procedure: LIGATE FISTULA ARTERIOVENOUS UPPER EXTREMITY;  Revision of Right Arm Arteriovenous Fistula ;  Surgeon: Padmaja Eaton MD;  Location: [facility] OR    PERCUTANEOUS BIOPSY KIDNEY Right 9/26/2018    Procedure: PERCUTANEOUS BIOPSY KIDNEY;  Right Kidney Biopsy;  Surgeon: Yuval Khan,  MD;  [facility]: [facility] OR    PERCUTANEOUS BIOPSY KIDNEY Right 10/30/2019    Procedure: Right Side Kidney Biopsy;  Surgeon: Jake Sidhu MD;  [facility]: [facility] OR    PICC INSERTION  14    PICC line placement 14; Removal 2014    REMOVE PORT VASCULAR ACCESS Right 2019    Procedure: Right chest Port Removal;  Surgeon: Erasmo Ziegler PA-C;  Location: [facility] OR    THORACIC SURGERY  1980    Transposition great arteries, repaired at 8 months    THYROIDECTOMY Right 2017    Procedure: THYROIDECTOMY;  Bilateral Total Thyroidectomy ;  Surgeon: Beka Soto MD;  Location: [facility] OR    TRANSPLANT KIDNEY RECIPIENT  DONOR  5/10/2016    Procedure: TRANSPLANT KIDNEY RECIPIENT  DONOR;  Surgeon: Ricky Deshpande MD;  Location: [facility] OR    TRANSPLANT LIVER RECIPIENT  DONOR N/A 5/10/2016    Procedure: TRANSPLANT LIVER RECIPIENT  DONOR;  Surgeon: Ricky Deshpande MD;  Location: [facility] OR        Family History   Problem Relation Age of Onset    Hypertension Mother     Anxiety Disorder Mother     Hyperlipidemia Mother     Arthritis Father     Hypertension Father     Hypertension Sister     Anxiety Disorder Sister     No Known Problems Brother     No Known Problems Brother     No Known Problems Maternal Grandmother     No Known Problems Maternal Grandfather     No Known Problems Paternal Grandmother     No Known Problems Paternal Grandfather     Alcohol/Drug No family hx of     Gastrointestinal Disease No family hx of         no fam hx of liver disease or liver cancer       Social History     Socioeconomic History    Marital status: Single     Spouse name: Not on file    Number of children: 0    Years of education: Not on file    Highest education level: High school graduate   Occupational History     Employer: UNEMPLOYED   Tobacco Use    Smoking status: Former     Current packs/day: 0.00     Average packs/day: 1 pack/day for 3.1 years (3.1 ttl pk-yrs)     Types: Cigarettes      Start date: 1997     Quit date: 2000     Years since quittin.1     Passive exposure: Current    Smokeless tobacco: Former     Quit date: 9/10/2001    Tobacco comments:     Quit chewing in early    Vaping Use    Vaping status: Never Used   Substance and Sexual Activity    Alcohol use: No     Alcohol/week: 0.0 standard drinks of alcohol     Comment: ~10 drinks per day for ten years, quit in 2014    Drug use: No     Types: Marijuana     Comment: in HS    Sexual activity: Not Currently     Partners: Female     Birth control/protection: None   Other Topics Concern    Parent/sibling w/ CABG, MI or angioplasty before 65F 55M? No   Social History Narrative    ? Blood transfusion as baby during surgery,    Professional performed tattoos     No Illicit IV or intranasal drug use.      Social Drivers of Health     Financial Resource Strain: Low Risk  (12/15/2023)    Financial Resource Strain     Within the past 12 months, have you or your family members you live with been unable to get utilities (heat, electricity) when it was really needed?: No   Food Insecurity: Low Risk  (12/15/2023)    Food Insecurity     Within the past 12 months, did you worry that your food would run out before you got money to buy more?: No     Within the past 12 months, did the food you bought just not last and you didn t have money to get more?: No   Transportation Needs: Low Risk  (12/15/2023)    Transportation Needs     Within the past 12 months, has lack of transportation kept you from medical appointments, getting your medicines, non-medical meetings or appointments, work, or from getting things that you need?: No   Physical Activity: Not on file   Stress: No Stress Concern Present (10/15/2020)    [employer] [place] [employer] - Occupational Stress Questionnaire     Feeling of Stress : Only a little   Social Connections: Unknown (10/15/2020)    Social Connection and Isolation Panel [NHANES]     Frequency of  Communication with Friends and Family: Not asked     Frequency of Social Gatherings with Friends and Family: Not asked     Attends Taoist Services: Not asked     Active Member of Clubs or Organizations: Not asked     Attends Club or Organization Meetings: Not asked     Marital Status: Never    Interpersonal Safety: Low Risk  (12/15/2023)    Interpersonal Safety     Do you feel physically and emotionally safe where you currently live?: Yes     Within the past 12 months, have you been hit, slapped, kicked or otherwise physically hurt by someone?: No     Within the past 12 months, have you been humiliated or emotionally abused in other ways by your partner or ex-partner?: No   Housing Stability: Low Risk  (12/15/2023)    Housing Stability     Do you have housing? : Yes     Are you worried about losing your housing?: No       Outpatient Encounter Medications as of 10/30/2024   Medication Sig Dispense Refill    apixaban ANTICOAGULANT (ELIQUIS ANTICOAGULANT) 5 MG tablet Take 1 tablet (5 mg) by mouth 2 times daily. 180 tablet 1    fluocinonide (LIDEX) 0.05 % external solution Apply sparingly twice daily to scalp  as needed. 60 mL 5    ketoconazole (NIZORAL) 2 % external cream Apply daily to affected area on face. 30 g 7    ketoconazole (NIZORAL) 2 % external shampoo Leave on scalp for 5 minutes then rinse. Use 2-3 time weekly. 120 mL 6    levothyroxine (SYNTHROID/LEVOTHROID) 137 MCG tablet TAKE ONE TABLET BY MOUTH ONCE DAILY 90 tablet 1    lisinopril (ZESTRIL) 20 MG tablet TAKE ONE TABLET BY MOUTH ONCE DAILY 90 tablet 3    LORazepam (ATIVAN) 1 MG tablet Take 1 tablet (1 mg) by mouth daily as needed (severe anxiety/panic/sleep). +NEEDS VISIT+ 5 tablet 0    magnesium oxide (MAG-OX) 400 MG tablet TAKE 1 TABLET BY MOUTH TWICE A  tablet 11    metoprolol succinate ER (TOPROL XL) 25 MG 24 hr tablet TAKE 2 TABLETS (50 MG) BY MOUTH EVERY MORNING AND 1 TABLET (25 MG) EVERY EVENING. 270 tablet 0    mycophenolate  (GENERIC EQUIVALENT) 250 MG capsule Take 2 capsules (500 mg) by mouth 2 times daily 120 capsule 11    sulfamethoxazole-trimethoprim (BACTRIM) 400-80 MG tablet TAKE ONE TABLET BY MOUTH ONCE DAILY 30 tablet 11    tacrolimus (GENERIC) 0.5 MG capsule TAKE ONE CAPSULE BY MOUTH EVERY MORNING (TOTAL DOSE IS 1.5MG EVERY MORNING AND 1MG EVERY EVENING) 90 capsule 3    tacrolimus (GENERIC) 1 MG capsule TAKE ONE CAPSULE BY MOUTH TWICE A DAY TOTAL DOSE 1.5MG IN THE MORNING AND 1MG IN THE EVENING 180 capsule 3    tacrolimus (PROTOPIC) 0.1 % external ointment Apply sparingly daily to face then reduce in 2-3 time weekly. 30 g 5    traZODone (DESYREL) 50 MG tablet Take 1 tablet (50 mg) by mouth at bedtime as needed, may repeat once for sleep 180 tablet 0     No facility-administered encounter medications on file as of 10/30/2024.             O:   NAD, WDWN, Alert & Oriented, Mood & Affect wnl, Vitals stable   Here today alone   There were no vitals taken for this visit.   General appearance normal   Vitals stable   Alert, oriented and in no acute distress      No rash seen on scalp and face   Stuck on papules and brown macules on trunk and ext   Red papules on trunk  Brown papules and macules with regular pigment network and borders on torso and extremities      The remainder of skin exam is normal      Eyes: Conjunctivae/lids:Normal     ENT: Lips normal    MSK:Normal    Cardiovascular: peripheral edema none    Pulm: Breathing Normal    Neuro/Psych: Orientation:Alert and Orientedx3 ; Mood/Affect:normal   A/P:  Seborrheic eczema- controlled.   Discussed chronic problem.   Use ketoconazole  shampoo, leave on for 5 minutes then rinse, use 2-3 times weekly.  Apply ketoconazole cream to face in am.  Apply protopic sparingly to face in the evening.   Apply lidex solution as needed.   No refills needed at this time.    Seborrheic keratosis, lentigo, angioma, benign nevi, history of transplant    It was a pleasure speaking to Cricket ISAAC  April today.  BENIGN LESIONS DISCUSSED WITH PATIENT:  I discussed the specifics of tumor, prognosis, and genetics of benign lesions.  I explained that treatment of these lesions would be purely cosmetic and not medically neccessary.  I discussed with patient different removal options including excision, cautery and /or laser.      Nature and genetics of benign skin lesions dicussed with patient.  Signs and Symptoms of skin cancer discussed with patient.  ABCDEs of melanoma reviewed with patient.  Patient encouraged to perform monthly skin exams.  UV precautions reviewed with patient.  Risks of non-melanoma skin cancer discussed with patient   Return to clinic as needed.

## 2024-10-30 NOTE — LETTER
10/30/2024      Cricket Chen  [address] [facility] [id number]      Dear Colleague,    Thank you for referring your patient, Cricket Chen, to the [facility]. Please see a copy of my visit note below.    Cricket Chen is a pleasant 44 year old year old male patient here today for skin check. He notes no new or changing nevi. No painful or bleeding skin lesions. He notes rashes on controlled on scalp and face with prescriptions from last year.  Patient has no other skin complaints today.  Remainder of the HPI, Meds, PMH, Allergies, FH, and SH was reviewed in chart.    Pertinent Hx:  No personal history of skin cancer. History of kidney and liver transplant 2016  Past Medical History:   Diagnosis Date     Alcohol abuse     Last drink in Mid-April 2014     Anemia in ESRD (end-stage renal disease) (H)      Anxiety 2008     Atrial flutter (H) 2017     Cirrhosis (H)     S/P liver transplant     Depression      History of blood transfusion      History of transposition of great vessels     atrial switch at age 8 months old     Hypertension      Liver transplant recipient (H)     2016     Papillary thyroid carcinoma (H)      Pneumonia 11-15-14     Renal transplant recipient     2016     Varices, esophageal (H)        Past Surgical History:   Procedure Laterality Date     ANESTHESIA CARDIOVERSION N/A 3/7/2018    Procedure: ANESTHESIA CARDIOVERSION;  Cardioversion;  Surgeon: GENERIC ANESTHESIA PROVIDER;  Location:  OR     BENCH LIVER N/A 5/10/2016    Procedure: BENCH LIVER;  Surgeon: Ricky Deshpande MD;  Location: [facility] OR     BIOPSY LYMPH NODE CERVICAL Right 1/26/2017    Procedure: BIOPSY LYMPH NODE CERVICAL;  Surgeon: Beka Soto MD;  Location:  OR     CARDIAC SURGERY  9-10-80     CREATE FISTULA ARTERIOVENOUS UPPER EXTREMITY Right 9/16/2014    Procedure: CREATE FISTULA ARTERIOVENOUS UPPER EXTREMITY;  Surgeon: Padmaja Eaton MD;  Location: [facility] OR     CV RIGHT HEART CATH  MEASUREMENTS RECORDED N/A 4/19/2019    Procedure: CV RIGHT HEART CATH;  Surgeon: Sabi Wiggins MD;  Location:  HEART CARDIAC CATH LAB     CYSTOSCOPY, REMOVE STENT(S), COMBINED Right 6/22/2016    Procedure: COMBINED CYSTOSCOPY, REMOVE STENT(S);  Surgeon: Ricky Deshpande MD;  [facility]: [facility] OR     EMBOLECTOMY UPPER EXTREMITY Right 8/17/2018    Procedure: EMBOLECTOMY UPPER EXTREMITY;  Repair Right Upper Arm Pseudo Aneursym ;  Surgeon: John Payton MD;  Location:  OR     ENT SURGERY       EP INSERT ICD Left 2/28/2023    Procedure: Insert Implantable Cardioverter-Defibrillator (ICD);  Surgeon: Jaylen George MD;  Location:  HEART CARDIAC CATH LAB     ESOPHAGOSCOPY, GASTROSCOPY, DUODENOSCOPY (EGD), COMBINED  5/30/2014    Procedure: COMBINED ESOPHAGOSCOPY, GASTROSCOPY, DUODENOSCOPY (EGD);  Surgeon: Guillaume Bautista MD;  Location:  GI     ESOPHAGOSCOPY, GASTROSCOPY, DUODENOSCOPY (EGD), COMBINED  9/30/14     ESOPHAGOSCOPY, GASTROSCOPY, DUODENOSCOPY (EGD), COMBINED Left 3/12/2015    Procedure: COMBINED ESOPHAGOSCOPY, GASTROSCOPY, DUODENOSCOPY (EGD);  Surgeon: Laureen Galvan MD;  Location:  GI     ESOPHAGOSCOPY, GASTROSCOPY, DUODENOSCOPY (EGD), COMBINED N/A 4/21/2016    Procedure: COMBINED ESOPHAGOSCOPY, GASTROSCOPY, DUODENOSCOPY (EGD);  Surgeon: Laureen Galvan MD;  Location:  GI     ESOPHAGOSCOPY, GASTROSCOPY, DUODENOSCOPY (EGD), COMBINED N/A 7/21/2016    Procedure: COMBINED ESOPHAGOSCOPY, GASTROSCOPY, DUODENOSCOPY (EGD);  Surgeon: Laureen Galvan MD;  Location:  GI     HC OR CATH ABLATION NON-CARDIAC ENDOVASCULAR  1990,2002    SVT     INSERT PORT VASCULAR ACCESS N/A 4/3/2017    Procedure: INSERT PORT VASCULAR ACCESS;  Surgeon: Rajendra Jacques PA-C;  [facility]     IR PARACENTESIS  5/11/2014     IR PARACENTESIS  5/23/2014     IR PORT REMOVAL LEFT  1/22/2019     LIGATE FISTULA ARTERIOVENOUS UPPER EXTREMITY Right 11/7/2017    Procedure:  LIGATE FISTULA ARTERIOVENOUS UPPER EXTREMITY;  Revision of Right Arm Arteriovenous Fistula ;  Surgeon: Padmaja Eaton MD;  Location: [facility] OR     PERCUTANEOUS BIOPSY KIDNEY Right 2018    Procedure: PERCUTANEOUS BIOPSY KIDNEY;  Right Kidney Biopsy;  Surgeon: Yuval Khan MD;  [facility]     PERCUTANEOUS BIOPSY KIDNEY Right 10/30/2019    Procedure: Right Side Kidney Biopsy;  Surgeon: Jake Sidhu MD;  [facility]     PICC INSERTION  14    PICC line placement 14; Removal 2014     REMOVE PORT VASCULAR ACCESS Right 2019    Procedure: Right chest Port Removal;  Surgeon: Erasmo Ziegler PA-C;  Location: [facility]     THORACIC SURGERY  1980    Transposition great arteries, repaired at 8 months     THYROIDECTOMY Right 2017    Procedure: THYROIDECTOMY;  Bilateral Total Thyroidectomy ;  Surgeon: Beka Soto MD;  Location: [facility] OR     TRANSPLANT KIDNEY RECIPIENT  DONOR  5/10/2016    Procedure: TRANSPLANT KIDNEY RECIPIENT  DONOR;  Surgeon: Ricky Deshpande MD;  [facility]: [facility]     TRANSPLANT LIVER RECIPIENT  DONOR N/A 5/10/2016    Procedure: TRANSPLANT LIVER RECIPIENT  DONOR;  Surgeon: Ricky Deshpande MD;  Location: [facility] OR        Family History   Problem Relation Age of Onset     Hypertension Mother      Anxiety Disorder Mother      Hyperlipidemia Mother      Arthritis Father      Hypertension Father      Hypertension Sister      Anxiety Disorder Sister      No Known Problems Brother      No Known Problems Brother      No Known Problems Maternal Grandmother      No Known Problems Maternal Grandfather      No Known Problems Paternal Grandmother      No Known Problems Paternal Grandfather      Alcohol/Drug No family hx of      Gastrointestinal Disease No family hx of         no fam hx of liver disease or liver cancer       Social History     Socioeconomic History     Marital status: Single     Spouse name: Not on file     Number of  children: 0     Years of education: Not on file     Highest education level: High school graduate   Occupational History     Employer: UNEMPLOYED   Tobacco Use     Smoking status: Former     Current packs/day: 0.00     Average packs/day: 1 pack/day for 3.1 years (3.1 ttl pk-yrs)     Types: Cigarettes     Start date: 1997     Quit date: 2000     Years since quittin.1     Passive exposure: Current     Smokeless tobacco: Former     Quit date: 9/10/2001     Tobacco comments:     Quit chewing in early    Vaping Use     Vaping status: Never Used   Substance and Sexual Activity     Alcohol use: No     Alcohol/week: 0.0 standard drinks of alcohol     Comment: ~10 drinks per day for ten years, quit in 2014     Drug use: No     Types: Marijuana     Comment: in      Sexual activity: Not Currently     Partners: Female     Birth control/protection: None   Other Topics Concern     Parent/sibling w/ CABG, MI or angioplasty before 65F 55M? No   Social History Narrative    ? Blood transfusion as baby during surgery,    Professional performed tattoos     No Illicit IV or intranasal drug use.      Social Drivers of Health     Financial Resource Strain: Low Risk  (12/15/2023)    Financial Resource Strain      Within the past 12 months, have you or your family members you live with been unable to get utilities (heat, electricity) when it was really needed?: No   Food Insecurity: Low Risk  (12/15/2023)    Food Insecurity      Within the past 12 months, did you worry that your food would run out before you got money to buy more?: No      Within the past 12 months, did the food you bought just not last and you didn t have money to get more?: No   Transportation Needs: Low Risk  (12/15/2023)    Transportation Needs      Within the past 12 months, has lack of transportation kept you from medical appointments, getting your medicines, non-medical meetings or appointments, work, or from getting things that you  need?: No   Physical Activity: Not on file   Stress: No Stress Concern Present (10/15/2020)    [employer] [place] [employer] - Occupational Stress Questionnaire      Feeling of Stress : Only a little   Social Connections: Unknown (10/15/2020)    Social Connection and Isolation Panel [NHANES]      Frequency of Communication with Friends and Family: Not asked      Frequency of Social Gatherings with Friends and Family: Not asked      Attends Sabianist Services: Not asked      Active Member of Clubs or Organizations: Not asked      Attends Club or Organization Meetings: Not asked      Marital Status: Never    Interpersonal Safety: Low Risk  (12/15/2023)    Interpersonal Safety      Do you feel physically and emotionally safe where you currently live?: Yes      Within the past 12 months, have you been hit, slapped, kicked or otherwise physically hurt by someone?: No      Within the past 12 months, have you been humiliated or emotionally abused in other ways by your partner or ex-partner?: No   Housing Stability: Low Risk  (12/15/2023)    Housing Stability      Do you have housing? : Yes      Are you worried about losing your housing?: No       Outpatient Encounter Medications as of 10/30/2024   Medication Sig Dispense Refill     apixaban ANTICOAGULANT (ELIQUIS ANTICOAGULANT) 5 MG tablet Take 1 tablet (5 mg) by mouth 2 times daily. 180 tablet 1     fluocinonide (LIDEX) 0.05 % external solution Apply sparingly twice daily to scalp  as needed. 60 mL 5     ketoconazole (NIZORAL) 2 % external cream Apply daily to affected area on face. 30 g 7     ketoconazole (NIZORAL) 2 % external shampoo Leave on scalp for 5 minutes then rinse. Use 2-3 time weekly. 120 mL 6     levothyroxine (SYNTHROID/LEVOTHROID) 137 MCG tablet TAKE ONE TABLET BY MOUTH ONCE DAILY 90 tablet 1     lisinopril (ZESTRIL) 20 MG tablet TAKE ONE TABLET BY MOUTH ONCE DAILY 90 tablet 3     LORazepam (ATIVAN) 1 MG tablet Take 1 tablet (1 mg) by  mouth daily as needed (severe anxiety/panic/sleep). +NEEDS VISIT+ 5 tablet 0     magnesium oxide (MAG-OX) 400 MG tablet TAKE 1 TABLET BY MOUTH TWICE A  tablet 11     metoprolol succinate ER (TOPROL XL) 25 MG 24 hr tablet TAKE 2 TABLETS (50 MG) BY MOUTH EVERY MORNING AND 1 TABLET (25 MG) EVERY EVENING. 270 tablet 0     mycophenolate (GENERIC EQUIVALENT) 250 MG capsule Take 2 capsules (500 mg) by mouth 2 times daily 120 capsule 11     sulfamethoxazole-trimethoprim (BACTRIM) 400-80 MG tablet TAKE ONE TABLET BY MOUTH ONCE DAILY 30 tablet 11     tacrolimus (GENERIC) 0.5 MG capsule TAKE ONE CAPSULE BY MOUTH EVERY MORNING (TOTAL DOSE IS 1.5MG EVERY MORNING AND 1MG EVERY EVENING) 90 capsule 3     tacrolimus (GENERIC) 1 MG capsule TAKE ONE CAPSULE BY MOUTH TWICE A DAY TOTAL DOSE 1.5MG IN THE MORNING AND 1MG IN THE EVENING 180 capsule 3     tacrolimus (PROTOPIC) 0.1 % external ointment Apply sparingly daily to face then reduce in 2-3 time weekly. 30 g 5     traZODone (DESYREL) 50 MG tablet Take 1 tablet (50 mg) by mouth at bedtime as needed, may repeat once for sleep 180 tablet 0     No facility-administered encounter medications on file as of 10/30/2024.             O:   NAD, WDWN, Alert & Oriented, Mood & Affect wnl, Vitals stable   Here today alone   There were no vitals taken for this visit.   General appearance normal   Vitals stable   Alert, oriented and in no acute distress      No rash seen on scalp and face   Stuck on papules and brown macules on trunk and ext   Red papules on trunk  Brown papules and macules with regular pigment network and borders on torso and extremities      The remainder of skin exam is normal      Eyes: Conjunctivae/lids:Normal     ENT: Lips normal    MSK:Normal    Cardiovascular: peripheral edema none    Pulm: Breathing Normal    Neuro/Psych: Orientation:Alert and Orientedx3 ; Mood/Affect:normal   A/P:  Seborrheic eczema- controlled.   Discussed chronic problem.   Use ketoconazole   shampoo, leave on for 5 minutes then rinse, use 2-3 times weekly.  Apply ketoconazole cream to face in am.  Apply protopic sparingly to face in the evening.   Apply lidex solution as needed.   No refills needed at this time.    Seborrheic keratosis, lentigo, angioma, benign nevi, history of transplant    It was a pleasure speaking to Cricket Chen today.  BENIGN LESIONS DISCUSSED WITH PATIENT:  I discussed the specifics of tumor, prognosis, and genetics of benign lesions.  I explained that treatment of these lesions would be purely cosmetic and not medically neccessary.  I discussed with patient different removal options including excision, cautery and /or laser.      Nature and genetics of benign skin lesions dicussed with patient.  Signs and Symptoms of skin cancer discussed with patient.  ABCDEs of melanoma reviewed with patient.  Patient encouraged to perform monthly skin exams.  UV precautions reviewed with patient.  Risks of non-melanoma skin cancer discussed with patient   Return to clinic as needed.       Again, thank you for allowing me to participate in the care of your patient.        Sincerely,        Saranya Calderon PA-C

## 2024-11-13 DIAGNOSIS — E89.0 POSTOPERATIVE HYPOTHYROIDISM: ICD-10-CM

## 2024-11-13 DIAGNOSIS — I48.92 ATRIAL FLUTTER, UNSPECIFIED TYPE (H): ICD-10-CM

## 2024-11-13 DIAGNOSIS — C73 PAPILLARY THYROID CARCINOMA (H): ICD-10-CM

## 2024-11-14 RX ORDER — METOPROLOL SUCCINATE 25 MG/1
TABLET, EXTENDED RELEASE ORAL
Qty: 270 TABLET | Refills: 3 | Status: SHIPPED | OUTPATIENT
Start: 2024-11-14

## 2024-11-17 DIAGNOSIS — E89.0 POSTOPERATIVE HYPOTHYROIDISM: ICD-10-CM

## 2024-11-17 DIAGNOSIS — C73 PAPILLARY THYROID CARCINOMA (H): Primary | ICD-10-CM

## 2024-11-17 RX ORDER — LEVOTHYROXINE SODIUM 137 UG/1
137 TABLET ORAL DAILY
Qty: 90 TABLET | Refills: 1 | Status: SHIPPED | OUTPATIENT
Start: 2024-11-17

## 2024-11-17 NOTE — TELEPHONE ENCOUNTER
levothyroxine (SYNTHROID/LEVOTHROID) 137 MCG tablet   Disp-90 tablet, R-1,   Start: 03/20/2024     10/14/2022  [facility] Zuleyma Kay MD  Endocrinology, Diabetes, and Metabolism    Nv:none    - RTC 1 year     Scheduling has been notified to contact the pt for appointment.      Routed because: past due for appt. Lv 10/22 gap in med rf.

## 2024-11-25 ENCOUNTER — ANCILLARY PROCEDURE (OUTPATIENT)
Dept: CARDIOLOGY | Facility: CLINIC | Age: 44
End: 2024-11-25
Attending: INTERNAL MEDICINE
Payer: COMMERCIAL

## 2024-11-25 DIAGNOSIS — I42.8 NICM (NONISCHEMIC CARDIOMYOPATHY) (H): ICD-10-CM

## 2024-11-25 DIAGNOSIS — Q20.3 D-TGA (DEXTRO-TRANSPOSITION OF GREAT ARTERIES): ICD-10-CM

## 2024-11-25 DIAGNOSIS — I48.92 ATRIAL FLUTTER, UNSPECIFIED TYPE (H): ICD-10-CM

## 2024-11-25 PROCEDURE — 93295 DEV INTERROG REMOTE 1/2/MLT: CPT | Performed by: INTERNAL MEDICINE

## 2024-11-25 PROCEDURE — 93296 REM INTERROG EVL PM/IDS: CPT

## 2024-12-10 LAB
MDC_IDC_LEAD_CONNECTION_STATUS: NORMAL
MDC_IDC_LEAD_CONNECTION_STATUS: NORMAL
MDC_IDC_LEAD_IMPLANT_DT: NORMAL
MDC_IDC_LEAD_IMPLANT_DT: NORMAL
MDC_IDC_LEAD_LOCATION: NORMAL
MDC_IDC_LEAD_LOCATION: NORMAL
MDC_IDC_LEAD_LOCATION_DETAIL_1: NORMAL
MDC_IDC_LEAD_LOCATION_DETAIL_1: NORMAL
MDC_IDC_LEAD_MFG: NORMAL
MDC_IDC_LEAD_MFG: NORMAL
MDC_IDC_LEAD_MODEL: NORMAL
MDC_IDC_LEAD_MODEL: NORMAL
MDC_IDC_LEAD_POLARITY_TYPE: NORMAL
MDC_IDC_LEAD_POLARITY_TYPE: NORMAL
MDC_IDC_LEAD_SERIAL: NORMAL
MDC_IDC_LEAD_SERIAL: NORMAL
MDC_IDC_LEAD_SPECIAL_FUNCTION: NORMAL
MDC_IDC_LEAD_SPECIAL_FUNCTION: NORMAL
MDC_IDC_MSMT_BATTERY_DTM: NORMAL
MDC_IDC_MSMT_BATTERY_REMAINING_LONGEVITY: 135 MO
MDC_IDC_MSMT_BATTERY_RRT_TRIGGER: NORMAL
MDC_IDC_MSMT_BATTERY_VOLTAGE: 3 V
MDC_IDC_MSMT_CAP_CHARGE_DTM: NORMAL
MDC_IDC_MSMT_CAP_CHARGE_ENERGY: 18 J
MDC_IDC_MSMT_CAP_CHARGE_TIME: 3.8 S
MDC_IDC_MSMT_CAP_CHARGE_TYPE: NORMAL
MDC_IDC_MSMT_LEADCHNL_RA_IMPEDANCE_VALUE: 475 OHM
MDC_IDC_MSMT_LEADCHNL_RA_PACING_THRESHOLD_AMPLITUDE: 1.75 V
MDC_IDC_MSMT_LEADCHNL_RA_PACING_THRESHOLD_PULSEWIDTH: 0.4 MS
MDC_IDC_MSMT_LEADCHNL_RA_SENSING_INTR_AMPL: 4.8 MV
MDC_IDC_MSMT_LEADCHNL_RV_IMPEDANCE_VALUE: 342 OHM
MDC_IDC_MSMT_LEADCHNL_RV_IMPEDANCE_VALUE: 475 OHM
MDC_IDC_MSMT_LEADCHNL_RV_PACING_THRESHOLD_AMPLITUDE: 0.75 V
MDC_IDC_MSMT_LEADCHNL_RV_PACING_THRESHOLD_PULSEWIDTH: 0.4 MS
MDC_IDC_MSMT_LEADCHNL_RV_SENSING_INTR_AMPL: 9.8 MV
MDC_IDC_PG_IMPLANT_DTM: NORMAL
MDC_IDC_PG_MFG: NORMAL
MDC_IDC_PG_MODEL: NORMAL
MDC_IDC_PG_SERIAL: NORMAL
MDC_IDC_PG_TYPE: NORMAL
MDC_IDC_SESS_CLINIC_NAME: NORMAL
MDC_IDC_SESS_DTM: NORMAL
MDC_IDC_SESS_TYPE: NORMAL
MDC_IDC_SET_BRADY_AT_MODE_SWITCH_RATE: 171 {BEATS}/MIN
MDC_IDC_SET_BRADY_LOWRATE: 50 {BEATS}/MIN
MDC_IDC_SET_BRADY_MAX_SENSOR_RATE: 150 {BEATS}/MIN
MDC_IDC_SET_BRADY_MAX_TRACKING_RATE: 150 {BEATS}/MIN
MDC_IDC_SET_BRADY_MODE: NORMAL
MDC_IDC_SET_BRADY_PAV_DELAY_LOW: 180 MS
MDC_IDC_SET_BRADY_SAV_DELAY_LOW: 150 MS
MDC_IDC_SET_LEADCHNL_RA_PACING_AMPLITUDE: 2.75 V
MDC_IDC_SET_LEADCHNL_RA_PACING_ANODE_ELECTRODE_1: NORMAL
MDC_IDC_SET_LEADCHNL_RA_PACING_ANODE_LOCATION_1: NORMAL
MDC_IDC_SET_LEADCHNL_RA_PACING_CAPTURE_MODE: NORMAL
MDC_IDC_SET_LEADCHNL_RA_PACING_CATHODE_ELECTRODE_1: NORMAL
MDC_IDC_SET_LEADCHNL_RA_PACING_CATHODE_LOCATION_1: NORMAL
MDC_IDC_SET_LEADCHNL_RA_PACING_POLARITY: NORMAL
MDC_IDC_SET_LEADCHNL_RA_PACING_PULSEWIDTH: 0.4 MS
MDC_IDC_SET_LEADCHNL_RA_SENSING_ANODE_ELECTRODE_1: NORMAL
MDC_IDC_SET_LEADCHNL_RA_SENSING_ANODE_LOCATION_1: NORMAL
MDC_IDC_SET_LEADCHNL_RA_SENSING_CATHODE_ELECTRODE_1: NORMAL
MDC_IDC_SET_LEADCHNL_RA_SENSING_CATHODE_LOCATION_1: NORMAL
MDC_IDC_SET_LEADCHNL_RA_SENSING_POLARITY: NORMAL
MDC_IDC_SET_LEADCHNL_RA_SENSING_SENSITIVITY: 0.3 MV
MDC_IDC_SET_LEADCHNL_RV_PACING_AMPLITUDE: 2 V
MDC_IDC_SET_LEADCHNL_RV_PACING_ANODE_ELECTRODE_1: NORMAL
MDC_IDC_SET_LEADCHNL_RV_PACING_ANODE_LOCATION_1: NORMAL
MDC_IDC_SET_LEADCHNL_RV_PACING_CAPTURE_MODE: NORMAL
MDC_IDC_SET_LEADCHNL_RV_PACING_CATHODE_ELECTRODE_1: NORMAL
MDC_IDC_SET_LEADCHNL_RV_PACING_CATHODE_LOCATION_1: NORMAL
MDC_IDC_SET_LEADCHNL_RV_PACING_POLARITY: NORMAL
MDC_IDC_SET_LEADCHNL_RV_PACING_PULSEWIDTH: 0.4 MS
MDC_IDC_SET_LEADCHNL_RV_SENSING_ANODE_ELECTRODE_1: NORMAL
MDC_IDC_SET_LEADCHNL_RV_SENSING_ANODE_LOCATION_1: NORMAL
MDC_IDC_SET_LEADCHNL_RV_SENSING_CATHODE_ELECTRODE_1: NORMAL
MDC_IDC_SET_LEADCHNL_RV_SENSING_CATHODE_LOCATION_1: NORMAL
MDC_IDC_SET_LEADCHNL_RV_SENSING_POLARITY: NORMAL
MDC_IDC_SET_LEADCHNL_RV_SENSING_SENSITIVITY: 0.3 MV
MDC_IDC_SET_ZONE_DETECTION_BEATS_DENOMINATOR: 16 {BEATS}
MDC_IDC_SET_ZONE_DETECTION_BEATS_DENOMINATOR: 32 {BEATS}
MDC_IDC_SET_ZONE_DETECTION_BEATS_DENOMINATOR: 40 {BEATS}
MDC_IDC_SET_ZONE_DETECTION_BEATS_NUMERATOR: 16 {BEATS}
MDC_IDC_SET_ZONE_DETECTION_BEATS_NUMERATOR: 30 {BEATS}
MDC_IDC_SET_ZONE_DETECTION_BEATS_NUMERATOR: 32 {BEATS}
MDC_IDC_SET_ZONE_DETECTION_INTERVAL: 300 MS
MDC_IDC_SET_ZONE_DETECTION_INTERVAL: 350 MS
MDC_IDC_SET_ZONE_DETECTION_INTERVAL: 350 MS
MDC_IDC_SET_ZONE_DETECTION_INTERVAL: 360 MS
MDC_IDC_SET_ZONE_STATUS: NORMAL
MDC_IDC_SET_ZONE_TYPE: NORMAL
MDC_IDC_SET_ZONE_VENDOR_TYPE: NORMAL
MDC_IDC_STAT_AT_BURDEN_PERCENT: 0 %
MDC_IDC_STAT_AT_DTM_END: NORMAL
MDC_IDC_STAT_AT_DTM_START: NORMAL
MDC_IDC_STAT_BRADY_AP_VP_PERCENT: 0.01 %
MDC_IDC_STAT_BRADY_AP_VS_PERCENT: 3.48 %
MDC_IDC_STAT_BRADY_AS_VP_PERCENT: 0.03 %
MDC_IDC_STAT_BRADY_AS_VS_PERCENT: 96.48 %
MDC_IDC_STAT_BRADY_DTM_END: NORMAL
MDC_IDC_STAT_BRADY_DTM_START: NORMAL
MDC_IDC_STAT_BRADY_RA_PERCENT_PACED: 3.78 %
MDC_IDC_STAT_BRADY_RV_PERCENT_PACED: 0.04 %
MDC_IDC_STAT_CRT_DTM_END: NORMAL
MDC_IDC_STAT_CRT_DTM_START: NORMAL
MDC_IDC_STAT_EPISODE_RECENT_COUNT: 0
MDC_IDC_STAT_EPISODE_RECENT_COUNT_DTM_END: NORMAL
MDC_IDC_STAT_EPISODE_RECENT_COUNT_DTM_START: NORMAL
MDC_IDC_STAT_EPISODE_TOTAL_COUNT: 0
MDC_IDC_STAT_EPISODE_TOTAL_COUNT: 4
MDC_IDC_STAT_EPISODE_TOTAL_COUNT_DTM_END: NORMAL
MDC_IDC_STAT_EPISODE_TOTAL_COUNT_DTM_START: NORMAL
MDC_IDC_STAT_EPISODE_TYPE: NORMAL
MDC_IDC_STAT_TACHYTHERAPY_ATP_DELIVERED_RECENT: 0
MDC_IDC_STAT_TACHYTHERAPY_ATP_DELIVERED_TOTAL: 0
MDC_IDC_STAT_TACHYTHERAPY_RECENT_DTM_END: NORMAL
MDC_IDC_STAT_TACHYTHERAPY_RECENT_DTM_START: NORMAL
MDC_IDC_STAT_TACHYTHERAPY_SHOCKS_ABORTED_RECENT: 0
MDC_IDC_STAT_TACHYTHERAPY_SHOCKS_ABORTED_TOTAL: 0
MDC_IDC_STAT_TACHYTHERAPY_SHOCKS_DELIVERED_RECENT: 0
MDC_IDC_STAT_TACHYTHERAPY_SHOCKS_DELIVERED_TOTAL: 0
MDC_IDC_STAT_TACHYTHERAPY_TOTAL_DTM_END: NORMAL
MDC_IDC_STAT_TACHYTHERAPY_TOTAL_DTM_START: NORMAL

## 2025-01-15 ENCOUNTER — LAB (OUTPATIENT)
Dept: LAB | Facility: CLINIC | Age: 45
End: 2025-01-15
Payer: COMMERCIAL

## 2025-01-15 DIAGNOSIS — Z94.4 LIVER REPLACED BY TRANSPLANT (H): ICD-10-CM

## 2025-01-15 DIAGNOSIS — C73 PAPILLARY THYROID CARCINOMA (H): ICD-10-CM

## 2025-01-15 DIAGNOSIS — E89.0 POSTOPERATIVE HYPOTHYROIDISM: ICD-10-CM

## 2025-01-15 LAB
ALBUMIN SERPL BCG-MCNC: 4.6 G/DL (ref 3.5–5.2)
ALP SERPL-CCNC: 72 U/L (ref 40–150)
ALT SERPL W P-5'-P-CCNC: 48 U/L (ref 0–70)
ANION GAP SERPL CALCULATED.3IONS-SCNC: 9 MMOL/L (ref 7–15)
AST SERPL W P-5'-P-CCNC: 35 U/L (ref 0–45)
BILIRUB DIRECT SERPL-MCNC: <0.2 MG/DL (ref 0–0.3)
BILIRUB SERPL-MCNC: 0.4 MG/DL
BUN SERPL-MCNC: 27.3 MG/DL (ref 6–20)
CALCIUM SERPL-MCNC: 10.1 MG/DL (ref 8.8–10.4)
CHLORIDE SERPL-SCNC: 103 MMOL/L (ref 98–107)
CREAT SERPL-MCNC: 1.31 MG/DL (ref 0.67–1.17)
EGFRCR SERPLBLD CKD-EPI 2021: 69 ML/MIN/1.73M2
ERYTHROCYTE [DISTWIDTH] IN BLOOD BY AUTOMATED COUNT: 12.1 % (ref 10–15)
GLUCOSE SERPL-MCNC: 101 MG/DL (ref 70–99)
HCO3 SERPL-SCNC: 27 MMOL/L (ref 22–29)
HCT VFR BLD AUTO: 45.1 % (ref 40–53)
HGB BLD-MCNC: 15.6 G/DL (ref 13.3–17.7)
MAGNESIUM SERPL-MCNC: 1.8 MG/DL (ref 1.7–2.3)
MCH RBC QN AUTO: 30.6 PG (ref 26.5–33)
MCHC RBC AUTO-ENTMCNC: 34.6 G/DL (ref 31.5–36.5)
MCV RBC AUTO: 89 FL (ref 78–100)
PLATELET # BLD AUTO: 139 10E3/UL (ref 150–450)
POTASSIUM SERPL-SCNC: 4.6 MMOL/L (ref 3.4–5.3)
PROT SERPL-MCNC: 7.2 G/DL (ref 6.4–8.3)
RBC # BLD AUTO: 5.09 10E6/UL (ref 4.4–5.9)
SODIUM SERPL-SCNC: 139 MMOL/L (ref 135–145)
T4 FREE SERPL-MCNC: 1.44 NG/DL (ref 0.9–1.7)
TACROLIMUS BLD-MCNC: 3.9 UG/L (ref 5–15)
TME LAST DOSE: ABNORMAL H
TME LAST DOSE: ABNORMAL H
TSH SERPL DL<=0.005 MIU/L-ACNC: 0.37 UIU/ML (ref 0.3–4.2)
WBC # BLD AUTO: 5.2 10E3/UL (ref 4–11)

## 2025-01-15 PROCEDURE — 82248 BILIRUBIN DIRECT: CPT

## 2025-01-15 PROCEDURE — 36415 COLL VENOUS BLD VENIPUNCTURE: CPT

## 2025-01-15 PROCEDURE — 83735 ASSAY OF MAGNESIUM: CPT

## 2025-01-15 PROCEDURE — 99000 SPECIMEN HANDLING OFFICE-LAB: CPT

## 2025-01-15 PROCEDURE — 80053 COMPREHEN METABOLIC PANEL: CPT

## 2025-01-15 PROCEDURE — 80197 ASSAY OF TACROLIMUS: CPT

## 2025-01-15 PROCEDURE — 86800 THYROGLOBULIN ANTIBODY: CPT | Mod: 90

## 2025-01-15 PROCEDURE — 84432 ASSAY OF THYROGLOBULIN: CPT | Mod: 90

## 2025-01-15 PROCEDURE — 85027 COMPLETE CBC AUTOMATED: CPT

## 2025-01-15 PROCEDURE — 84439 ASSAY OF FREE THYROXINE: CPT

## 2025-01-15 PROCEDURE — 84443 ASSAY THYROID STIM HORMONE: CPT

## 2025-01-23 NOTE — PROGRESS NOTES
Is This A New Presentation, Or A Follow-Up?: Warts
Mr Chen is being seen in follow-up after ligation of his non-utilized right upper arm AV fistula. The procedure was uncomplicated and he was discharged home on the same day. He has had a lot of trouble with the compression wrap that we place for him. It appears it was removed from his hand as a result his hand swelled significantly. The wrap was completely taken off now and he is doing well. With no real problems.    /87 (BP Location: Left arm)  Pulse 73  Resp 16  SpO2 97%    No pulsatile flow in the fistula. Areas of aneurysmal degeneration are largely resolved and collapsed. 2 small incisions at the antecubital fossa and at the shoulder are healing nicely.    IMP/PLAN: Arm rewrapped with Kerlix and Coban and Ace. Instructions given to leave the Kerlix and Coban and on for a week and rewrapped the Ace as necessary. I purposely wrapped in Kerlix and Coban very loosely so that they would not be uncomfortable. He will return next week to see Beth to have his sutures removed. Follow-up after that will be p.r.n.  Answers for HPI/ROS submitted by the patient on 11/8/2017   General Symptoms: No  Skin Symptoms: No  HENT Symptoms: No  EYE SYMPTOMS: No  HEART SYMPTOMS: No  LUNG SYMPTOMS: No  INTESTINAL SYMPTOMS: No  URINARY SYMPTOMS: No  REPRODUCTIVE SYMPTOMS: No  SKELETAL SYMPTOMS: No  BLOOD SYMPTOMS: No  NERVOUS SYSTEM SYMPTOMS: No  MENTAL HEALTH SYMPTOMS: No    
How Severe Are Your Warts?: moderate

## 2025-01-29 ENCOUNTER — TELEPHONE (OUTPATIENT)
Dept: ENDOCRINOLOGY | Facility: CLINIC | Age: 45
End: 2025-01-29
Payer: COMMERCIAL

## 2025-01-29 NOTE — TELEPHONE ENCOUNTER
Left Voicemail (1st Attempt) for the patient to call back and schedule the following:    Appointment type: return endocrine   Provider: Zuleyma   Return date: 2/7 at 11:30 in person csc slot on hold for pt   Specialty phone number: [phone]   Additional appointment(s) needed:   Additonal Notes: LVM, MyC x1   Zuleyma Gray MD  [id number]  2/7 at 11:30 is fine too    Melissa Costa on 1/29/2025 at 9:07 AM

## 2025-02-01 ENCOUNTER — HEALTH MAINTENANCE LETTER (OUTPATIENT)
Age: 45
End: 2025-02-01

## 2025-02-26 ENCOUNTER — ANCILLARY PROCEDURE (OUTPATIENT)
Dept: CARDIOLOGY | Facility: CLINIC | Age: 45
End: 2025-02-26
Attending: INTERNAL MEDICINE
Payer: COMMERCIAL

## 2025-02-26 DIAGNOSIS — I42.8 NICM (NONISCHEMIC CARDIOMYOPATHY) (H): ICD-10-CM

## 2025-02-26 DIAGNOSIS — Q20.3 D-TGA (DEXTRO-TRANSPOSITION OF GREAT ARTERIES): ICD-10-CM

## 2025-02-26 DIAGNOSIS — I48.92 ATRIAL FLUTTER, UNSPECIFIED TYPE (H): ICD-10-CM

## 2025-02-26 LAB
MDC_IDC_LEAD_CONNECTION_STATUS: NORMAL
MDC_IDC_LEAD_CONNECTION_STATUS: NORMAL
MDC_IDC_LEAD_IMPLANT_DT: NORMAL
MDC_IDC_LEAD_IMPLANT_DT: NORMAL
MDC_IDC_LEAD_LOCATION: NORMAL
MDC_IDC_LEAD_LOCATION: NORMAL
MDC_IDC_LEAD_LOCATION_DETAIL_1: NORMAL
MDC_IDC_LEAD_LOCATION_DETAIL_1: NORMAL
MDC_IDC_LEAD_MFG: NORMAL
MDC_IDC_LEAD_MFG: NORMAL
MDC_IDC_LEAD_MODEL: NORMAL
MDC_IDC_LEAD_MODEL: NORMAL
MDC_IDC_LEAD_POLARITY_TYPE: NORMAL
MDC_IDC_LEAD_POLARITY_TYPE: NORMAL
MDC_IDC_LEAD_SERIAL: NORMAL
MDC_IDC_LEAD_SERIAL: NORMAL
MDC_IDC_LEAD_SPECIAL_FUNCTION: NORMAL
MDC_IDC_LEAD_SPECIAL_FUNCTION: NORMAL
MDC_IDC_MSMT_BATTERY_DTM: NORMAL
MDC_IDC_MSMT_BATTERY_REMAINING_LONGEVITY: 132 MO
MDC_IDC_MSMT_BATTERY_RRT_TRIGGER: NORMAL
MDC_IDC_MSMT_BATTERY_VOLTAGE: 3 V
MDC_IDC_MSMT_CAP_CHARGE_DTM: NORMAL
MDC_IDC_MSMT_CAP_CHARGE_ENERGY: 18 J
MDC_IDC_MSMT_CAP_CHARGE_TIME: 3.8 S
MDC_IDC_MSMT_CAP_CHARGE_TYPE: NORMAL
MDC_IDC_MSMT_LEADCHNL_RA_IMPEDANCE_VALUE: 475 OHM
MDC_IDC_MSMT_LEADCHNL_RA_PACING_THRESHOLD_AMPLITUDE: 2 V
MDC_IDC_MSMT_LEADCHNL_RA_PACING_THRESHOLD_PULSEWIDTH: 0.4 MS
MDC_IDC_MSMT_LEADCHNL_RA_SENSING_INTR_AMPL: 4.6 MV
MDC_IDC_MSMT_LEADCHNL_RV_IMPEDANCE_VALUE: 323 OHM
MDC_IDC_MSMT_LEADCHNL_RV_IMPEDANCE_VALUE: 437 OHM
MDC_IDC_MSMT_LEADCHNL_RV_PACING_THRESHOLD_AMPLITUDE: 0.88 V
MDC_IDC_MSMT_LEADCHNL_RV_PACING_THRESHOLD_PULSEWIDTH: 0.4 MS
MDC_IDC_MSMT_LEADCHNL_RV_SENSING_INTR_AMPL: 9.5 MV
MDC_IDC_PG_IMPLANT_DTM: NORMAL
MDC_IDC_PG_MFG: NORMAL
MDC_IDC_PG_MODEL: NORMAL
MDC_IDC_PG_SERIAL: NORMAL
MDC_IDC_PG_TYPE: NORMAL
MDC_IDC_SESS_CLINIC_NAME: NORMAL
MDC_IDC_SESS_DTM: NORMAL
MDC_IDC_SESS_TYPE: NORMAL
MDC_IDC_SET_BRADY_AT_MODE_SWITCH_RATE: 171 {BEATS}/MIN
MDC_IDC_SET_BRADY_LOWRATE: 50 {BEATS}/MIN
MDC_IDC_SET_BRADY_MAX_SENSOR_RATE: 150 {BEATS}/MIN
MDC_IDC_SET_BRADY_MAX_TRACKING_RATE: 150 {BEATS}/MIN
MDC_IDC_SET_BRADY_MODE: NORMAL
MDC_IDC_SET_BRADY_PAV_DELAY_LOW: 180 MS
MDC_IDC_SET_BRADY_SAV_DELAY_LOW: 150 MS
MDC_IDC_SET_LEADCHNL_RA_PACING_AMPLITUDE: 3 V
MDC_IDC_SET_LEADCHNL_RA_PACING_ANODE_ELECTRODE_1: NORMAL
MDC_IDC_SET_LEADCHNL_RA_PACING_ANODE_LOCATION_1: NORMAL
MDC_IDC_SET_LEADCHNL_RA_PACING_CAPTURE_MODE: NORMAL
MDC_IDC_SET_LEADCHNL_RA_PACING_CATHODE_ELECTRODE_1: NORMAL
MDC_IDC_SET_LEADCHNL_RA_PACING_CATHODE_LOCATION_1: NORMAL
MDC_IDC_SET_LEADCHNL_RA_PACING_POLARITY: NORMAL
MDC_IDC_SET_LEADCHNL_RA_PACING_PULSEWIDTH: 0.4 MS
MDC_IDC_SET_LEADCHNL_RA_SENSING_ANODE_ELECTRODE_1: NORMAL
MDC_IDC_SET_LEADCHNL_RA_SENSING_ANODE_LOCATION_1: NORMAL
MDC_IDC_SET_LEADCHNL_RA_SENSING_CATHODE_ELECTRODE_1: NORMAL
MDC_IDC_SET_LEADCHNL_RA_SENSING_CATHODE_LOCATION_1: NORMAL
MDC_IDC_SET_LEADCHNL_RA_SENSING_POLARITY: NORMAL
MDC_IDC_SET_LEADCHNL_RA_SENSING_SENSITIVITY: 0.3 MV
MDC_IDC_SET_LEADCHNL_RV_PACING_AMPLITUDE: 2 V
MDC_IDC_SET_LEADCHNL_RV_PACING_ANODE_ELECTRODE_1: NORMAL
MDC_IDC_SET_LEADCHNL_RV_PACING_ANODE_LOCATION_1: NORMAL
MDC_IDC_SET_LEADCHNL_RV_PACING_CAPTURE_MODE: NORMAL
MDC_IDC_SET_LEADCHNL_RV_PACING_CATHODE_ELECTRODE_1: NORMAL
MDC_IDC_SET_LEADCHNL_RV_PACING_CATHODE_LOCATION_1: NORMAL
MDC_IDC_SET_LEADCHNL_RV_PACING_POLARITY: NORMAL
MDC_IDC_SET_LEADCHNL_RV_PACING_PULSEWIDTH: 0.4 MS
MDC_IDC_SET_LEADCHNL_RV_SENSING_ANODE_ELECTRODE_1: NORMAL
MDC_IDC_SET_LEADCHNL_RV_SENSING_ANODE_LOCATION_1: NORMAL
MDC_IDC_SET_LEADCHNL_RV_SENSING_CATHODE_ELECTRODE_1: NORMAL
MDC_IDC_SET_LEADCHNL_RV_SENSING_CATHODE_LOCATION_1: NORMAL
MDC_IDC_SET_LEADCHNL_RV_SENSING_POLARITY: NORMAL
MDC_IDC_SET_LEADCHNL_RV_SENSING_SENSITIVITY: 0.3 MV
MDC_IDC_SET_ZONE_DETECTION_BEATS_DENOMINATOR: 16 {BEATS}
MDC_IDC_SET_ZONE_DETECTION_BEATS_DENOMINATOR: 32 {BEATS}
MDC_IDC_SET_ZONE_DETECTION_BEATS_DENOMINATOR: 40 {BEATS}
MDC_IDC_SET_ZONE_DETECTION_BEATS_NUMERATOR: 16 {BEATS}
MDC_IDC_SET_ZONE_DETECTION_BEATS_NUMERATOR: 30 {BEATS}
MDC_IDC_SET_ZONE_DETECTION_BEATS_NUMERATOR: 32 {BEATS}
MDC_IDC_SET_ZONE_DETECTION_INTERVAL: 300 MS
MDC_IDC_SET_ZONE_DETECTION_INTERVAL: 350 MS
MDC_IDC_SET_ZONE_DETECTION_INTERVAL: 350 MS
MDC_IDC_SET_ZONE_DETECTION_INTERVAL: 360 MS
MDC_IDC_SET_ZONE_STATUS: NORMAL
MDC_IDC_SET_ZONE_TYPE: NORMAL
MDC_IDC_SET_ZONE_VENDOR_TYPE: NORMAL
MDC_IDC_STAT_AT_BURDEN_PERCENT: 0 %
MDC_IDC_STAT_AT_DTM_END: NORMAL
MDC_IDC_STAT_AT_DTM_START: NORMAL
MDC_IDC_STAT_BRADY_DTM_END: NORMAL
MDC_IDC_STAT_BRADY_DTM_START: NORMAL
MDC_IDC_STAT_BRADY_RA_PERCENT_PACED: 3.7 %
MDC_IDC_STAT_BRADY_RV_PERCENT_PACED: 0.04 %
MDC_IDC_STAT_CRT_DTM_END: NORMAL
MDC_IDC_STAT_CRT_DTM_START: NORMAL
MDC_IDC_STAT_EPISODE_RECENT_COUNT: 0
MDC_IDC_STAT_EPISODE_RECENT_COUNT_DTM_END: NORMAL
MDC_IDC_STAT_EPISODE_RECENT_COUNT_DTM_START: NORMAL
MDC_IDC_STAT_EPISODE_TOTAL_COUNT: 0
MDC_IDC_STAT_EPISODE_TOTAL_COUNT: 4
MDC_IDC_STAT_EPISODE_TOTAL_COUNT_DTM_END: NORMAL
MDC_IDC_STAT_EPISODE_TOTAL_COUNT_DTM_START: NORMAL
MDC_IDC_STAT_EPISODE_TYPE: NORMAL
MDC_IDC_STAT_TACHYTHERAPY_ATP_DELIVERED_RECENT: 0
MDC_IDC_STAT_TACHYTHERAPY_ATP_DELIVERED_TOTAL: 0
MDC_IDC_STAT_TACHYTHERAPY_RECENT_DTM_END: NORMAL
MDC_IDC_STAT_TACHYTHERAPY_RECENT_DTM_START: NORMAL
MDC_IDC_STAT_TACHYTHERAPY_SHOCKS_ABORTED_RECENT: 0
MDC_IDC_STAT_TACHYTHERAPY_SHOCKS_ABORTED_TOTAL: 0
MDC_IDC_STAT_TACHYTHERAPY_SHOCKS_DELIVERED_RECENT: 0
MDC_IDC_STAT_TACHYTHERAPY_SHOCKS_DELIVERED_TOTAL: 0
MDC_IDC_STAT_TACHYTHERAPY_TOTAL_DTM_END: NORMAL
MDC_IDC_STAT_TACHYTHERAPY_TOTAL_DTM_START: NORMAL

## 2025-02-26 PROCEDURE — 93295 DEV INTERROG REMOTE 1/2/MLT: CPT | Performed by: INTERNAL MEDICINE

## 2025-02-26 PROCEDURE — 93296 REM INTERROG EVL PM/IDS: CPT

## 2025-03-20 DIAGNOSIS — I48.92 ATRIAL FLUTTER, UNSPECIFIED TYPE (H): ICD-10-CM

## 2025-03-20 DIAGNOSIS — Q20.3 COMPLETE TRANSPOSITION OF GREAT VESSELS: ICD-10-CM

## 2025-03-20 DIAGNOSIS — Z94.0 KIDNEY TRANSPLANTED: ICD-10-CM

## 2025-03-20 RX ORDER — LISINOPRIL 20 MG/1
20 TABLET ORAL
Qty: 90 TABLET | Refills: 3 | Status: SHIPPED | OUTPATIENT
Start: 2025-03-20

## 2025-04-02 ENCOUNTER — ANCILLARY ORDERS (OUTPATIENT)
Dept: CARDIOLOGY | Facility: CLINIC | Age: 45
End: 2025-04-02
Payer: COMMERCIAL

## 2025-04-02 DIAGNOSIS — I42.9 SECONDARY CARDIOMYOPATHY (H): Primary | ICD-10-CM

## 2025-04-02 DIAGNOSIS — Z94.0 HTN, KIDNEY TRANSPLANT RELATED: ICD-10-CM

## 2025-04-02 DIAGNOSIS — I15.1 HTN, KIDNEY TRANSPLANT RELATED: ICD-10-CM

## 2025-04-02 DIAGNOSIS — B27.00 EBV (EPSTEIN-BARR VIRUS) VIREMIA: ICD-10-CM

## 2025-04-21 DIAGNOSIS — Z94.4 LIVER REPLACED BY TRANSPLANT (H): ICD-10-CM

## 2025-04-21 DIAGNOSIS — I48.92 ATRIAL FLUTTER, UNSPECIFIED TYPE (H): ICD-10-CM

## 2025-04-21 DIAGNOSIS — Z94.0 S/P KIDNEY TRANSPLANT: ICD-10-CM

## 2025-04-21 RX ORDER — SULFAMETHOXAZOLE AND TRIMETHOPRIM 400; 80 MG/1; MG/1
1 TABLET ORAL
Qty: 30 TABLET | Refills: 11 | Status: SHIPPED | OUTPATIENT
Start: 2025-04-21

## 2025-05-20 ENCOUNTER — TELEPHONE (OUTPATIENT)
Dept: TRANSPLANT | Facility: CLINIC | Age: 45
End: 2025-05-20

## 2025-05-20 ENCOUNTER — LAB (OUTPATIENT)
Dept: LAB | Facility: CLINIC | Age: 45
End: 2025-05-20
Payer: COMMERCIAL

## 2025-05-20 ENCOUNTER — RESULTS FOLLOW-UP (OUTPATIENT)
Dept: TRANSPLANT | Facility: CLINIC | Age: 45
End: 2025-05-20

## 2025-05-20 DIAGNOSIS — R79.89 ELEVATED LFTS: Primary | ICD-10-CM

## 2025-05-20 DIAGNOSIS — Z94.4 LIVER REPLACED BY TRANSPLANT (H): ICD-10-CM

## 2025-05-20 DIAGNOSIS — Z94.0 HTN, KIDNEY TRANSPLANT RELATED: ICD-10-CM

## 2025-05-20 DIAGNOSIS — B27.00 EBV (EPSTEIN-BARR VIRUS) VIREMIA: ICD-10-CM

## 2025-05-20 DIAGNOSIS — I15.1 HTN, KIDNEY TRANSPLANT RELATED: ICD-10-CM

## 2025-05-20 LAB
ALBUMIN SERPL BCG-MCNC: 4.5 G/DL (ref 3.5–5.2)
ALP SERPL-CCNC: 61 U/L (ref 40–150)
ALT SERPL W P-5'-P-CCNC: 94 U/L (ref 0–70)
ANION GAP SERPL CALCULATED.3IONS-SCNC: 11 MMOL/L (ref 7–15)
AST SERPL W P-5'-P-CCNC: 73 U/L (ref 0–45)
BILIRUB SERPL-MCNC: 0.4 MG/DL
BILIRUBIN DIRECT (ROCHE PRO & PURE): 0.17 MG/DL (ref 0–0.45)
BUN SERPL-MCNC: 20.1 MG/DL (ref 6–20)
CALCIUM SERPL-MCNC: 9.3 MG/DL (ref 8.8–10.4)
CHLORIDE SERPL-SCNC: 102 MMOL/L (ref 98–107)
CREAT SERPL-MCNC: 1.26 MG/DL (ref 0.67–1.17)
EGFRCR SERPLBLD CKD-EPI 2021: 72 ML/MIN/1.73M2
ERYTHROCYTE [DISTWIDTH] IN BLOOD BY AUTOMATED COUNT: 12.3 % (ref 10–15)
GLUCOSE SERPL-MCNC: 108 MG/DL (ref 70–99)
HCO3 SERPL-SCNC: 23 MMOL/L (ref 22–29)
HCT VFR BLD AUTO: 47.9 % (ref 40–53)
HGB BLD-MCNC: 15.5 G/DL (ref 13.3–17.7)
MAGNESIUM SERPL-MCNC: 1.7 MG/DL (ref 1.7–2.3)
MCH RBC QN AUTO: 29.3 PG (ref 26.5–33)
MCHC RBC AUTO-ENTMCNC: 32.4 G/DL (ref 31.5–36.5)
MCV RBC AUTO: 91 FL (ref 78–100)
PLATELET # BLD AUTO: 142 10E3/UL (ref 150–450)
POTASSIUM SERPL-SCNC: 4.4 MMOL/L (ref 3.4–5.3)
PROT SERPL-MCNC: 6.8 G/DL (ref 6.4–8.3)
RBC # BLD AUTO: 5.29 10E6/UL (ref 4.4–5.9)
SODIUM SERPL-SCNC: 136 MMOL/L (ref 135–145)
TACROLIMUS BLD-MCNC: 3.2 UG/L (ref 5–15)
TME LAST DOSE: ABNORMAL H
TME LAST DOSE: ABNORMAL H
WBC # BLD AUTO: 5 10E3/UL (ref 4–11)

## 2025-05-20 PROCEDURE — 85027 COMPLETE CBC AUTOMATED: CPT

## 2025-05-20 PROCEDURE — 82248 BILIRUBIN DIRECT: CPT

## 2025-05-20 PROCEDURE — 36415 COLL VENOUS BLD VENIPUNCTURE: CPT

## 2025-05-20 PROCEDURE — 83735 ASSAY OF MAGNESIUM: CPT

## 2025-05-20 PROCEDURE — 80197 ASSAY OF TACROLIMUS: CPT

## 2025-05-20 PROCEDURE — 80053 COMPREHEN METABOLIC PANEL: CPT

## 2025-05-21 DIAGNOSIS — Z94.0 KIDNEY TRANSPLANT RECIPIENT: Primary | ICD-10-CM

## 2025-05-21 DIAGNOSIS — I15.1 HTN, KIDNEY TRANSPLANT RELATED: ICD-10-CM

## 2025-05-21 DIAGNOSIS — B27.00 EBV (EPSTEIN-BARR VIRUS) VIREMIA: ICD-10-CM

## 2025-05-21 DIAGNOSIS — Z94.0 HTN, KIDNEY TRANSPLANT RELATED: ICD-10-CM

## 2025-05-21 NOTE — TELEPHONE ENCOUNTER
COntacted Isidro.   No changes to meds. No supplements.   No illness .  No weight gain or loss that he is aware of.    Walking his dog daily.     Liquid IV is the only new thing he has started since march.  Reviewed with Saravanan, did not feel this would cause increase in LFTs.    Reviewed with dr bach. Pt to repeat in a a week and PETH.

## 2025-05-28 ENCOUNTER — ANCILLARY PROCEDURE (OUTPATIENT)
Dept: CARDIOLOGY | Facility: CLINIC | Age: 45
End: 2025-05-28
Attending: INTERNAL MEDICINE
Payer: COMMERCIAL

## 2025-05-28 DIAGNOSIS — I48.92 ATRIAL FLUTTER, UNSPECIFIED TYPE (H): ICD-10-CM

## 2025-05-28 DIAGNOSIS — I42.8 NICM (NONISCHEMIC CARDIOMYOPATHY) (H): ICD-10-CM

## 2025-05-28 DIAGNOSIS — Q20.3 D-TGA (DEXTRO-TRANSPOSITION OF GREAT ARTERIES): ICD-10-CM

## 2025-05-28 PROCEDURE — 93295 DEV INTERROG REMOTE 1/2/MLT: CPT | Performed by: INTERNAL MEDICINE

## 2025-05-28 PROCEDURE — 93296 REM INTERROG EVL PM/IDS: CPT

## 2025-05-29 ENCOUNTER — LAB (OUTPATIENT)
Dept: LAB | Facility: CLINIC | Age: 45
End: 2025-05-29
Payer: COMMERCIAL

## 2025-05-29 DIAGNOSIS — R79.89 ELEVATED LFTS: ICD-10-CM

## 2025-05-29 DIAGNOSIS — Z94.4 LIVER REPLACED BY TRANSPLANT (H): ICD-10-CM

## 2025-05-29 LAB
ALBUMIN SERPL BCG-MCNC: 4.6 G/DL (ref 3.5–5.2)
ALP SERPL-CCNC: 60 U/L (ref 40–150)
ALT SERPL W P-5'-P-CCNC: 25 U/L (ref 0–70)
AST SERPL W P-5'-P-CCNC: 26 U/L (ref 0–45)
BILIRUB SERPL-MCNC: 0.7 MG/DL
BILIRUBIN DIRECT (ROCHE PRO & PURE): 0.26 MG/DL (ref 0–0.45)
PROT SERPL-MCNC: 7.1 G/DL (ref 6.4–8.3)

## 2025-05-30 ENCOUNTER — RESULTS FOLLOW-UP (OUTPATIENT)
Dept: TRANSPLANT | Facility: CLINIC | Age: 45
End: 2025-05-30

## 2025-05-30 DIAGNOSIS — I15.1 HTN, KIDNEY TRANSPLANT RELATED: ICD-10-CM

## 2025-05-30 DIAGNOSIS — Z94.0 HTN, KIDNEY TRANSPLANT RELATED: ICD-10-CM

## 2025-05-30 DIAGNOSIS — B27.00 EBV (EPSTEIN-BARR VIRUS) VIREMIA: ICD-10-CM

## 2025-05-31 LAB
MDC_IDC_EPISODE_DTM: NORMAL
MDC_IDC_EPISODE_DURATION: 1 S
MDC_IDC_EPISODE_ID: 5
MDC_IDC_EPISODE_TYPE: NORMAL
MDC_IDC_LEAD_CONNECTION_STATUS: NORMAL
MDC_IDC_LEAD_CONNECTION_STATUS: NORMAL
MDC_IDC_LEAD_IMPLANT_DT: NORMAL
MDC_IDC_LEAD_IMPLANT_DT: NORMAL
MDC_IDC_LEAD_LOCATION: NORMAL
MDC_IDC_LEAD_LOCATION: NORMAL
MDC_IDC_LEAD_LOCATION_DETAIL_1: NORMAL
MDC_IDC_LEAD_LOCATION_DETAIL_1: NORMAL
MDC_IDC_LEAD_MFG: NORMAL
MDC_IDC_LEAD_MFG: NORMAL
MDC_IDC_LEAD_MODEL: NORMAL
MDC_IDC_LEAD_MODEL: NORMAL
MDC_IDC_LEAD_POLARITY_TYPE: NORMAL
MDC_IDC_LEAD_POLARITY_TYPE: NORMAL
MDC_IDC_LEAD_SERIAL: NORMAL
MDC_IDC_LEAD_SERIAL: NORMAL
MDC_IDC_LEAD_SPECIAL_FUNCTION: NORMAL
MDC_IDC_LEAD_SPECIAL_FUNCTION: NORMAL
MDC_IDC_MSMT_BATTERY_DTM: NORMAL
MDC_IDC_MSMT_BATTERY_REMAINING_LONGEVITY: 128 MO
MDC_IDC_MSMT_BATTERY_RRT_TRIGGER: NORMAL
MDC_IDC_MSMT_BATTERY_VOLTAGE: 3 V
MDC_IDC_MSMT_CAP_CHARGE_DTM: NORMAL
MDC_IDC_MSMT_CAP_CHARGE_ENERGY: 18 J
MDC_IDC_MSMT_CAP_CHARGE_TIME: 3.7 S
MDC_IDC_MSMT_CAP_CHARGE_TYPE: NORMAL
MDC_IDC_MSMT_LEADCHNL_RA_IMPEDANCE_VALUE: 475 OHM
MDC_IDC_MSMT_LEADCHNL_RA_PACING_THRESHOLD_AMPLITUDE: 2.38 V
MDC_IDC_MSMT_LEADCHNL_RA_PACING_THRESHOLD_PULSEWIDTH: 0.4 MS
MDC_IDC_MSMT_LEADCHNL_RA_SENSING_INTR_AMPL: 4.9 MV
MDC_IDC_MSMT_LEADCHNL_RV_IMPEDANCE_VALUE: 380 OHM
MDC_IDC_MSMT_LEADCHNL_RV_IMPEDANCE_VALUE: 513 OHM
MDC_IDC_MSMT_LEADCHNL_RV_PACING_THRESHOLD_AMPLITUDE: 0.75 V
MDC_IDC_MSMT_LEADCHNL_RV_PACING_THRESHOLD_PULSEWIDTH: 0.4 MS
MDC_IDC_MSMT_LEADCHNL_RV_SENSING_INTR_AMPL: 9.3 MV
MDC_IDC_PG_IMPLANT_DTM: NORMAL
MDC_IDC_PG_MFG: NORMAL
MDC_IDC_PG_MODEL: NORMAL
MDC_IDC_PG_SERIAL: NORMAL
MDC_IDC_PG_TYPE: NORMAL
MDC_IDC_SESS_CLINIC_NAME: NORMAL
MDC_IDC_SESS_DTM: NORMAL
MDC_IDC_SESS_TYPE: NORMAL
MDC_IDC_SET_BRADY_AT_MODE_SWITCH_RATE: 171 {BEATS}/MIN
MDC_IDC_SET_BRADY_LOWRATE: 50 {BEATS}/MIN
MDC_IDC_SET_BRADY_MAX_SENSOR_RATE: 150 {BEATS}/MIN
MDC_IDC_SET_BRADY_MAX_TRACKING_RATE: 150 {BEATS}/MIN
MDC_IDC_SET_BRADY_MODE: NORMAL
MDC_IDC_SET_BRADY_PAV_DELAY_LOW: 180 MS
MDC_IDC_SET_BRADY_SAV_DELAY_LOW: 150 MS
MDC_IDC_SET_LEADCHNL_RA_PACING_AMPLITUDE: 4.5 V
MDC_IDC_SET_LEADCHNL_RA_PACING_ANODE_ELECTRODE_1: NORMAL
MDC_IDC_SET_LEADCHNL_RA_PACING_ANODE_LOCATION_1: NORMAL
MDC_IDC_SET_LEADCHNL_RA_PACING_CAPTURE_MODE: NORMAL
MDC_IDC_SET_LEADCHNL_RA_PACING_CATHODE_ELECTRODE_1: NORMAL
MDC_IDC_SET_LEADCHNL_RA_PACING_CATHODE_LOCATION_1: NORMAL
MDC_IDC_SET_LEADCHNL_RA_PACING_POLARITY: NORMAL
MDC_IDC_SET_LEADCHNL_RA_PACING_PULSEWIDTH: 0.4 MS
MDC_IDC_SET_LEADCHNL_RA_SENSING_ANODE_ELECTRODE_1: NORMAL
MDC_IDC_SET_LEADCHNL_RA_SENSING_ANODE_LOCATION_1: NORMAL
MDC_IDC_SET_LEADCHNL_RA_SENSING_CATHODE_ELECTRODE_1: NORMAL
MDC_IDC_SET_LEADCHNL_RA_SENSING_CATHODE_LOCATION_1: NORMAL
MDC_IDC_SET_LEADCHNL_RA_SENSING_POLARITY: NORMAL
MDC_IDC_SET_LEADCHNL_RA_SENSING_SENSITIVITY: 0.3 MV
MDC_IDC_SET_LEADCHNL_RV_PACING_AMPLITUDE: 2 V
MDC_IDC_SET_LEADCHNL_RV_PACING_ANODE_ELECTRODE_1: NORMAL
MDC_IDC_SET_LEADCHNL_RV_PACING_ANODE_LOCATION_1: NORMAL
MDC_IDC_SET_LEADCHNL_RV_PACING_CAPTURE_MODE: NORMAL
MDC_IDC_SET_LEADCHNL_RV_PACING_CATHODE_ELECTRODE_1: NORMAL
MDC_IDC_SET_LEADCHNL_RV_PACING_CATHODE_LOCATION_1: NORMAL
MDC_IDC_SET_LEADCHNL_RV_PACING_POLARITY: NORMAL
MDC_IDC_SET_LEADCHNL_RV_PACING_PULSEWIDTH: 0.4 MS
MDC_IDC_SET_LEADCHNL_RV_SENSING_ANODE_ELECTRODE_1: NORMAL
MDC_IDC_SET_LEADCHNL_RV_SENSING_ANODE_LOCATION_1: NORMAL
MDC_IDC_SET_LEADCHNL_RV_SENSING_CATHODE_ELECTRODE_1: NORMAL
MDC_IDC_SET_LEADCHNL_RV_SENSING_CATHODE_LOCATION_1: NORMAL
MDC_IDC_SET_LEADCHNL_RV_SENSING_POLARITY: NORMAL
MDC_IDC_SET_LEADCHNL_RV_SENSING_SENSITIVITY: 0.3 MV
MDC_IDC_SET_ZONE_DETECTION_BEATS_DENOMINATOR: 16 {BEATS}
MDC_IDC_SET_ZONE_DETECTION_BEATS_DENOMINATOR: 32 {BEATS}
MDC_IDC_SET_ZONE_DETECTION_BEATS_DENOMINATOR: 40 {BEATS}
MDC_IDC_SET_ZONE_DETECTION_BEATS_NUMERATOR: 16 {BEATS}
MDC_IDC_SET_ZONE_DETECTION_BEATS_NUMERATOR: 30 {BEATS}
MDC_IDC_SET_ZONE_DETECTION_BEATS_NUMERATOR: 32 {BEATS}
MDC_IDC_SET_ZONE_DETECTION_INTERVAL: 300 MS
MDC_IDC_SET_ZONE_DETECTION_INTERVAL: 350 MS
MDC_IDC_SET_ZONE_DETECTION_INTERVAL: 350 MS
MDC_IDC_SET_ZONE_DETECTION_INTERVAL: 360 MS
MDC_IDC_SET_ZONE_STATUS: NORMAL
MDC_IDC_SET_ZONE_TYPE: NORMAL
MDC_IDC_SET_ZONE_VENDOR_TYPE: NORMAL
MDC_IDC_STAT_AT_BURDEN_PERCENT: 0 %
MDC_IDC_STAT_AT_DTM_END: NORMAL
MDC_IDC_STAT_AT_DTM_START: NORMAL
MDC_IDC_STAT_BRADY_DTM_END: NORMAL
MDC_IDC_STAT_BRADY_DTM_START: NORMAL
MDC_IDC_STAT_BRADY_RV_PERCENT_PACED: 0.05 %
MDC_IDC_STAT_CRT_DTM_END: NORMAL
MDC_IDC_STAT_CRT_DTM_START: NORMAL
MDC_IDC_STAT_EPISODE_RECENT_COUNT: 0
MDC_IDC_STAT_EPISODE_RECENT_COUNT: 1
MDC_IDC_STAT_EPISODE_RECENT_COUNT_DTM_END: NORMAL
MDC_IDC_STAT_EPISODE_RECENT_COUNT_DTM_START: NORMAL
MDC_IDC_STAT_EPISODE_TOTAL_COUNT: 0
MDC_IDC_STAT_EPISODE_TOTAL_COUNT: 5
MDC_IDC_STAT_EPISODE_TOTAL_COUNT_DTM_END: NORMAL
MDC_IDC_STAT_EPISODE_TOTAL_COUNT_DTM_START: NORMAL
MDC_IDC_STAT_EPISODE_TYPE: NORMAL
MDC_IDC_STAT_TACHYTHERAPY_ATP_DELIVERED_RECENT: 0
MDC_IDC_STAT_TACHYTHERAPY_ATP_DELIVERED_TOTAL: 0
MDC_IDC_STAT_TACHYTHERAPY_RECENT_DTM_END: NORMAL
MDC_IDC_STAT_TACHYTHERAPY_RECENT_DTM_START: NORMAL
MDC_IDC_STAT_TACHYTHERAPY_SHOCKS_ABORTED_RECENT: 0
MDC_IDC_STAT_TACHYTHERAPY_SHOCKS_ABORTED_TOTAL: 0
MDC_IDC_STAT_TACHYTHERAPY_SHOCKS_DELIVERED_RECENT: 0
MDC_IDC_STAT_TACHYTHERAPY_SHOCKS_DELIVERED_TOTAL: 0
MDC_IDC_STAT_TACHYTHERAPY_TOTAL_DTM_END: NORMAL
MDC_IDC_STAT_TACHYTHERAPY_TOTAL_DTM_START: NORMAL

## 2025-06-07 DIAGNOSIS — Z94.0 KIDNEY TRANSPLANT RECIPIENT: ICD-10-CM

## 2025-06-07 DIAGNOSIS — Z79.60 LONG-TERM USE OF IMMUNOSUPPRESSANT MEDICATION: ICD-10-CM

## 2025-06-09 RX ORDER — MYCOPHENOLATE MOFETIL 250 MG/1
500 CAPSULE ORAL 2 TIMES DAILY
Qty: 120 CAPSULE | Refills: 2 | Status: SHIPPED | OUTPATIENT
Start: 2025-06-09

## 2025-06-10 ENCOUNTER — TELEPHONE (OUTPATIENT)
Dept: PEDIATRIC CARDIOLOGY | Facility: CLINIC | Age: 45
End: 2025-06-10
Payer: COMMERCIAL

## 2025-08-11 ENCOUNTER — ANCILLARY PROCEDURE (OUTPATIENT)
Dept: ULTRASOUND IMAGING | Facility: CLINIC | Age: 45
End: 2025-08-11
Attending: INTERNAL MEDICINE
Payer: COMMERCIAL

## 2025-08-11 DIAGNOSIS — C73 PAPILLARY THYROID CARCINOMA (H): ICD-10-CM

## 2025-08-11 DIAGNOSIS — E89.0 POSTOPERATIVE HYPOTHYROIDISM: ICD-10-CM

## 2025-08-11 PROCEDURE — 76536 US EXAM OF HEAD AND NECK: CPT | Mod: TC | Performed by: RADIOLOGY

## 2025-08-18 ENCOUNTER — PATIENT OUTREACH (OUTPATIENT)
Dept: CARE COORDINATION | Facility: CLINIC | Age: 45
End: 2025-08-18
Payer: COMMERCIAL

## 2025-08-27 ENCOUNTER — VIRTUAL VISIT (OUTPATIENT)
Dept: TRANSPLANT | Facility: CLINIC | Age: 45
End: 2025-08-27
Attending: INTERNAL MEDICINE
Payer: COMMERCIAL

## 2025-08-27 DIAGNOSIS — B27.00 EBV (EPSTEIN-BARR VIRUS) VIREMIA: ICD-10-CM

## 2025-08-27 DIAGNOSIS — I48.92 ATRIAL FLUTTER, UNSPECIFIED TYPE (H): ICD-10-CM

## 2025-08-27 DIAGNOSIS — I15.1 HTN, KIDNEY TRANSPLANT RELATED: ICD-10-CM

## 2025-08-27 DIAGNOSIS — E89.0 POSTOPERATIVE HYPOTHYROIDISM: ICD-10-CM

## 2025-08-27 DIAGNOSIS — K70.10 ALCOHOLIC HEPATITIS WITHOUT ASCITES (H): ICD-10-CM

## 2025-08-27 DIAGNOSIS — Z94.0 HTN, KIDNEY TRANSPLANT RELATED: ICD-10-CM

## 2025-08-27 DIAGNOSIS — Z94.4 LIVER REPLACED BY TRANSPLANT (H): ICD-10-CM

## 2025-08-27 DIAGNOSIS — D84.9 IMMUNOSUPPRESSION: ICD-10-CM

## 2025-08-27 DIAGNOSIS — Z94.0 KIDNEY TRANSPLANT RECIPIENT: ICD-10-CM

## 2025-08-27 DIAGNOSIS — C73 PAPILLARY THYROID CARCINOMA (H): ICD-10-CM

## 2025-08-27 DIAGNOSIS — D47.Z1 POST-TRANSPLANT LYMPHOPROLIFERATIVE DISORDER (H): Primary | ICD-10-CM

## 2025-08-27 DIAGNOSIS — N25.81 SECONDARY RENAL HYPERPARATHYROIDISM: ICD-10-CM

## 2025-08-27 DIAGNOSIS — Z87.74 HISTORY OF TRANSPOSITION OF GREAT VESSELS: ICD-10-CM

## 2025-08-27 DIAGNOSIS — Z48.298 AFTERCARE FOLLOWING ORGAN TRANSPLANT: ICD-10-CM

## 2025-08-27 DIAGNOSIS — Z94.0 KIDNEY REPLACED BY TRANSPLANT: ICD-10-CM

## 2025-08-27 DIAGNOSIS — Z29.89 NEED FOR PNEUMOCYSTIS PROPHYLAXIS: ICD-10-CM

## 2025-08-27 DIAGNOSIS — D69.6 THROMBOCYTOPENIA: ICD-10-CM

## 2025-08-27 DIAGNOSIS — C73 PAPILLARY THYROID CARCINOMA (H): Primary | ICD-10-CM

## 2025-08-27 DIAGNOSIS — I48.0 PAROXYSMAL ATRIAL FIBRILLATION (H): ICD-10-CM

## 2025-08-27 PROCEDURE — 98007 SYNCH AUDIO-VIDEO EST HI 40: CPT | Performed by: INTERNAL MEDICINE

## 2025-08-27 PROCEDURE — G2211 COMPLEX E/M VISIT ADD ON: HCPCS | Performed by: INTERNAL MEDICINE

## 2025-08-27 PROCEDURE — 1126F AMNT PAIN NOTED NONE PRSNT: CPT | Mod: 95 | Performed by: INTERNAL MEDICINE

## 2025-09-02 ENCOUNTER — NURSE TRIAGE (OUTPATIENT)
Dept: CARDIOLOGY | Facility: CLINIC | Age: 45
End: 2025-09-02
Payer: COMMERCIAL

## 2025-09-02 DIAGNOSIS — I48.92 ATRIAL FLUTTER, UNSPECIFIED TYPE (H): Primary | ICD-10-CM

## 2025-09-02 DIAGNOSIS — I42.9 SECONDARY CARDIOMYOPATHY (H): ICD-10-CM

## 2025-09-03 ENCOUNTER — ANCILLARY PROCEDURE (OUTPATIENT)
Dept: CARDIOLOGY | Facility: CLINIC | Age: 45
End: 2025-09-03
Attending: INTERNAL MEDICINE
Payer: COMMERCIAL

## 2025-09-03 DIAGNOSIS — I42.9 SECONDARY CARDIOMYOPATHY (H): ICD-10-CM

## 2025-09-03 LAB
MDC_IDC_LEAD_CONNECTION_STATUS: NORMAL
MDC_IDC_LEAD_CONNECTION_STATUS: NORMAL
MDC_IDC_LEAD_IMPLANT_DT: NORMAL
MDC_IDC_LEAD_IMPLANT_DT: NORMAL
MDC_IDC_LEAD_LOCATION: NORMAL
MDC_IDC_LEAD_LOCATION: NORMAL
MDC_IDC_LEAD_LOCATION_DETAIL_1: NORMAL
MDC_IDC_LEAD_LOCATION_DETAIL_1: NORMAL
MDC_IDC_LEAD_MFG: NORMAL
MDC_IDC_LEAD_MFG: NORMAL
MDC_IDC_LEAD_MODEL: NORMAL
MDC_IDC_LEAD_MODEL: NORMAL
MDC_IDC_LEAD_POLARITY_TYPE: NORMAL
MDC_IDC_LEAD_POLARITY_TYPE: NORMAL
MDC_IDC_LEAD_SERIAL: NORMAL
MDC_IDC_LEAD_SERIAL: NORMAL
MDC_IDC_LEAD_SPECIAL_FUNCTION: NORMAL
MDC_IDC_LEAD_SPECIAL_FUNCTION: NORMAL
MDC_IDC_MSMT_BATTERY_DTM: NORMAL
MDC_IDC_MSMT_BATTERY_REMAINING_LONGEVITY: 121 MO
MDC_IDC_MSMT_BATTERY_RRT_TRIGGER: NORMAL
MDC_IDC_MSMT_BATTERY_STATUS: NORMAL
MDC_IDC_MSMT_BATTERY_VOLTAGE: 3 V
MDC_IDC_MSMT_CAP_CHARGE_DTM: NORMAL
MDC_IDC_MSMT_CAP_CHARGE_ENERGY: 18 J
MDC_IDC_MSMT_CAP_CHARGE_TIME: 3.7 S
MDC_IDC_MSMT_CAP_CHARGE_TYPE: NORMAL
MDC_IDC_MSMT_LEADCHNL_RA_IMPEDANCE_VALUE: 475 OHM
MDC_IDC_MSMT_LEADCHNL_RA_PACING_THRESHOLD_AMPLITUDE: 2.5 V
MDC_IDC_MSMT_LEADCHNL_RA_PACING_THRESHOLD_PULSEWIDTH: 0.4 MS
MDC_IDC_MSMT_LEADCHNL_RA_SENSING_INTR_AMPL: 4.3 MV
MDC_IDC_MSMT_LEADCHNL_RV_IMPEDANCE_VALUE: 323 OHM
MDC_IDC_MSMT_LEADCHNL_RV_IMPEDANCE_VALUE: 437 OHM
MDC_IDC_MSMT_LEADCHNL_RV_PACING_THRESHOLD_AMPLITUDE: 0.75 V
MDC_IDC_MSMT_LEADCHNL_RV_PACING_THRESHOLD_PULSEWIDTH: 0.4 MS
MDC_IDC_MSMT_LEADCHNL_RV_SENSING_INTR_AMPL: 9.8 MV
MDC_IDC_PG_IMPLANT_DTM: NORMAL
MDC_IDC_PG_MFG: NORMAL
MDC_IDC_PG_MODEL: NORMAL
MDC_IDC_PG_SERIAL: NORMAL
MDC_IDC_PG_TYPE: NORMAL
MDC_IDC_SESS_CLINIC_NAME: NORMAL
MDC_IDC_SESS_DTM: NORMAL
MDC_IDC_SESS_TYPE: NORMAL
MDC_IDC_SET_BRADY_AT_MODE_SWITCH_RATE: 171 {BEATS}/MIN
MDC_IDC_SET_BRADY_LOWRATE: 50 {BEATS}/MIN
MDC_IDC_SET_BRADY_MAX_SENSOR_RATE: 150 {BEATS}/MIN
MDC_IDC_SET_BRADY_MAX_TRACKING_RATE: 150 {BEATS}/MIN
MDC_IDC_SET_BRADY_MODE: NORMAL
MDC_IDC_SET_BRADY_PAV_DELAY_LOW: 180 MS
MDC_IDC_SET_BRADY_SAV_DELAY_LOW: 150 MS
MDC_IDC_SET_LEADCHNL_RA_PACING_AMPLITUDE: 5 V
MDC_IDC_SET_LEADCHNL_RA_PACING_ANODE_ELECTRODE_1: NORMAL
MDC_IDC_SET_LEADCHNL_RA_PACING_ANODE_LOCATION_1: NORMAL
MDC_IDC_SET_LEADCHNL_RA_PACING_CAPTURE_MODE: NORMAL
MDC_IDC_SET_LEADCHNL_RA_PACING_CATHODE_ELECTRODE_1: NORMAL
MDC_IDC_SET_LEADCHNL_RA_PACING_CATHODE_LOCATION_1: NORMAL
MDC_IDC_SET_LEADCHNL_RA_PACING_POLARITY: NORMAL
MDC_IDC_SET_LEADCHNL_RA_PACING_PULSEWIDTH: 1 MS
MDC_IDC_SET_LEADCHNL_RA_SENSING_ANODE_ELECTRODE_1: NORMAL
MDC_IDC_SET_LEADCHNL_RA_SENSING_ANODE_LOCATION_1: NORMAL
MDC_IDC_SET_LEADCHNL_RA_SENSING_CATHODE_ELECTRODE_1: NORMAL
MDC_IDC_SET_LEADCHNL_RA_SENSING_CATHODE_LOCATION_1: NORMAL
MDC_IDC_SET_LEADCHNL_RA_SENSING_POLARITY: NORMAL
MDC_IDC_SET_LEADCHNL_RA_SENSING_SENSITIVITY: 0.3 MV
MDC_IDC_SET_LEADCHNL_RV_PACING_AMPLITUDE: 2 V
MDC_IDC_SET_LEADCHNL_RV_PACING_ANODE_ELECTRODE_1: NORMAL
MDC_IDC_SET_LEADCHNL_RV_PACING_ANODE_LOCATION_1: NORMAL
MDC_IDC_SET_LEADCHNL_RV_PACING_CAPTURE_MODE: NORMAL
MDC_IDC_SET_LEADCHNL_RV_PACING_CATHODE_ELECTRODE_1: NORMAL
MDC_IDC_SET_LEADCHNL_RV_PACING_CATHODE_LOCATION_1: NORMAL
MDC_IDC_SET_LEADCHNL_RV_PACING_POLARITY: NORMAL
MDC_IDC_SET_LEADCHNL_RV_PACING_PULSEWIDTH: 0.4 MS
MDC_IDC_SET_LEADCHNL_RV_SENSING_ANODE_ELECTRODE_1: NORMAL
MDC_IDC_SET_LEADCHNL_RV_SENSING_ANODE_LOCATION_1: NORMAL
MDC_IDC_SET_LEADCHNL_RV_SENSING_CATHODE_ELECTRODE_1: NORMAL
MDC_IDC_SET_LEADCHNL_RV_SENSING_CATHODE_LOCATION_1: NORMAL
MDC_IDC_SET_LEADCHNL_RV_SENSING_POLARITY: NORMAL
MDC_IDC_SET_LEADCHNL_RV_SENSING_SENSITIVITY: 0.3 MV
MDC_IDC_SET_ZONE_DETECTION_BEATS_DENOMINATOR: 16 {BEATS}
MDC_IDC_SET_ZONE_DETECTION_BEATS_DENOMINATOR: 32 {BEATS}
MDC_IDC_SET_ZONE_DETECTION_BEATS_DENOMINATOR: 40 {BEATS}
MDC_IDC_SET_ZONE_DETECTION_BEATS_NUMERATOR: 16 {BEATS}
MDC_IDC_SET_ZONE_DETECTION_BEATS_NUMERATOR: 30 {BEATS}
MDC_IDC_SET_ZONE_DETECTION_BEATS_NUMERATOR: 32 {BEATS}
MDC_IDC_SET_ZONE_DETECTION_INTERVAL: 300 MS
MDC_IDC_SET_ZONE_DETECTION_INTERVAL: 350 MS
MDC_IDC_SET_ZONE_DETECTION_INTERVAL: 350 MS
MDC_IDC_SET_ZONE_DETECTION_INTERVAL: 360 MS
MDC_IDC_SET_ZONE_STATUS: NORMAL
MDC_IDC_SET_ZONE_TYPE: NORMAL
MDC_IDC_SET_ZONE_VENDOR_TYPE: NORMAL
MDC_IDC_STAT_AT_BURDEN_PERCENT: 0 %
MDC_IDC_STAT_AT_DTM_END: NORMAL
MDC_IDC_STAT_AT_DTM_START: NORMAL
MDC_IDC_STAT_BRADY_DTM_END: NORMAL
MDC_IDC_STAT_BRADY_DTM_START: NORMAL
MDC_IDC_STAT_BRADY_RV_PERCENT_PACED: 0.04 %
MDC_IDC_STAT_CRT_DTM_END: NORMAL
MDC_IDC_STAT_CRT_DTM_START: NORMAL
MDC_IDC_STAT_EPISODE_RECENT_COUNT: 0
MDC_IDC_STAT_EPISODE_RECENT_COUNT_DTM_END: NORMAL
MDC_IDC_STAT_EPISODE_RECENT_COUNT_DTM_START: NORMAL
MDC_IDC_STAT_EPISODE_TOTAL_COUNT: 0
MDC_IDC_STAT_EPISODE_TOTAL_COUNT: 5
MDC_IDC_STAT_EPISODE_TOTAL_COUNT_DTM_END: NORMAL
MDC_IDC_STAT_EPISODE_TOTAL_COUNT_DTM_START: NORMAL
MDC_IDC_STAT_EPISODE_TYPE: NORMAL
MDC_IDC_STAT_TACHYTHERAPY_ATP_DELIVERED_RECENT: 0
MDC_IDC_STAT_TACHYTHERAPY_ATP_DELIVERED_TOTAL: 0
MDC_IDC_STAT_TACHYTHERAPY_RECENT_DTM_END: NORMAL
MDC_IDC_STAT_TACHYTHERAPY_RECENT_DTM_START: NORMAL
MDC_IDC_STAT_TACHYTHERAPY_SHOCKS_ABORTED_RECENT: 0
MDC_IDC_STAT_TACHYTHERAPY_SHOCKS_ABORTED_TOTAL: 0
MDC_IDC_STAT_TACHYTHERAPY_SHOCKS_DELIVERED_RECENT: 0
MDC_IDC_STAT_TACHYTHERAPY_SHOCKS_DELIVERED_TOTAL: 0
MDC_IDC_STAT_TACHYTHERAPY_TOTAL_DTM_END: NORMAL
MDC_IDC_STAT_TACHYTHERAPY_TOTAL_DTM_START: NORMAL

## 2025-09-03 PROCEDURE — 93296 REM INTERROG EVL PM/IDS: CPT

## (undated) DEVICE — SOL WATER IRRIG 1000ML BOTTLE 2F7114

## (undated) DEVICE — COVER CAMERA IN-LIGHT DISP LT-C02

## (undated) DEVICE — LINEN TOWEL PACK X5 5464

## (undated) DEVICE — DRSG TEGADERM 4X4 3/4" 1626W

## (undated) DEVICE — SU VICRYL 3-0 SH 27" UND J416H

## (undated) DEVICE — NIM PROBE PRASS INCREMENTING TIP 8225825

## (undated) DEVICE — CLIP HORIZON SM RED WIDE SLOT 001201

## (undated) DEVICE — PREP CHLORAPREP W/ORANGE TINT 10.5ML 260715

## (undated) DEVICE — SU SILK 4-0 TIE 12X30" A303H

## (undated) DEVICE — Device

## (undated) DEVICE — DRAIN JACKSON PRATT 10MM FLAT 4/4 PERF SU130-1311

## (undated) DEVICE — DRSG GAUZE 4X4" TRAY 6939

## (undated) DEVICE — GOWN XLG DISP 9545

## (undated) DEVICE — SU VICRYL 3-0 SH 8X18" UND J864D

## (undated) DEVICE — ESU GROUND PAD ADULT W/CORD E7507

## (undated) DEVICE — BNDG ELASTIC 6"X5YDS STERILE 6611-6S

## (undated) DEVICE — SOL NACL 0.9% IRRIG 1000ML BOTTLE 2F7124

## (undated) DEVICE — SU SILK 2-0 TIE 12X30" A305H

## (undated) DEVICE — SUCTION SLEEVE NEPTUNE 2 125MM 0703-005-125

## (undated) DEVICE — GLOVE PROTEXIS POWDER FREE SMT 6.5  2D72PT65X

## (undated) DEVICE — 9FR X 13CM SAFESHEATH II TEAR-AWAY VALVED SHEATH SYSTEM, WITH SIDE PORT, 0.038IN GUIDEWIRE, 19.5CM DILATOR

## (undated) DEVICE — NDL BX MONOPTY 18GAX16CM 121816

## (undated) DEVICE — SU SILK 2-0 SH CR 5X18" C0125

## (undated) DEVICE — LINEN TOWEL PACK X30 5481

## (undated) DEVICE — SU MONOCRYL 4-0 PS-2 27" UND Y426H

## (undated) DEVICE — DRSG PRIMAPORE 03 1/8X6" 66000318

## (undated) DEVICE — DRAIN JACKSON PRATT RESERVOIR 100ML SU130-1305

## (undated) DEVICE — ESU ELEC BLADE 2.75" COATED/INSULATED E1455

## (undated) DEVICE — ELECTRODE DEFIB CADENCE 22550R

## (undated) DEVICE — WIRE GUIDE 0.035"X150CM EMERALD J TIP 502521

## (undated) DEVICE — STPL SKIN 35W 059037

## (undated) DEVICE — SPONGE KITTNER 30-101

## (undated) DEVICE — PREP POVIDONE IODINE SCRUB 7.5% 120ML

## (undated) DEVICE — DRAPE POUCH INSTRUMENT 1018

## (undated) DEVICE — BNDG ELASTIC 4"X5YDS STERILE 6611-4S

## (undated) DEVICE — SLITTER ADJSTBL 6232ADJ

## (undated) DEVICE — DRSG KERLIX 4 1/2"X4YDS ROLL 6715

## (undated) DEVICE — SU VICRYL 3-0 SH 27" J316H

## (undated) DEVICE — SU SILK 0 TIE 6X30" A306H

## (undated) DEVICE — SU PROLENE 6-0 C-1DA 30" 8706H

## (undated) DEVICE — SU SILK 3-0 TIE 12X30" A304H

## (undated) DEVICE — SU DERMABOND ADVANCED .7ML DNX12

## (undated) DEVICE — SU VICRYL 4-0 PS-2 18" UND J496H

## (undated) DEVICE — COVER ULTRASOUND PROBE W/GEL FLEXI-FEEL 6"X58" LF  25-FF658

## (undated) DEVICE — VESSEL LOOPS YELLOW MAXI 31145694

## (undated) DEVICE — CABLE PACING ALLIGATOR CLIP 12FT 5833SL

## (undated) DEVICE — BNDG ESMARK 4" STERILE

## (undated) DEVICE — GOWN LG DISP 9515

## (undated) DEVICE — PACK NEURO MINOR UMMC SNE32MNMU4

## (undated) DEVICE — BLADE CLIPPER SGL USE 9680

## (undated) DEVICE — RETR ELASTIC STAYS LONE STAR BLUNT DUAL LEAD 3550-1G

## (undated) DEVICE — GLOVE PROTEXIS W/NEU-THERA 7.0  2D73TE70

## (undated) DEVICE — DRAPE SHEET REV FOLD 3/4 9349

## (undated) DEVICE — TOURNIQUET CUFF 24" REPRO GREEN 60-7070-104

## (undated) DEVICE — SU SILK 5-0 TIE 12X30" A302H

## (undated) DEVICE — PREP CHLORAPREP 26ML TINTED ORANGE  260815

## (undated) DEVICE — SU ETHILON 3-0 PS-1 18" 1663H

## (undated) DEVICE — DECANTER BAG 2002S

## (undated) DEVICE — INTRO SHEATH 7FRX10CM PINNACLE RSS702

## (undated) DEVICE — LINEN TOWEL PACK X6 WHITE 5487

## (undated) DEVICE — 7FR X 13CM SAFESHEATH II TEAR-AWAY VALVED SHEATH SYSTEM, WITH SIDE PORT, 0.038IN GUIDEWIRE, 19.5CM DILATOR

## (undated) DEVICE — STIMULATOR NERVE NEURO-PULSE SURGICAL LOCATOR 30968-220

## (undated) DEVICE — PACK HEART LEFT CUSTOM

## (undated) DEVICE — SUCTION MANIFOLD DORNOCH ULTRA CART UL-CL500

## (undated) DEVICE — ESU EYE LOW TEMP 65410-010

## (undated) DEVICE — TOURNIQUET CUFF 12" REPRO LIGHT BLUE 60-7070-102

## (undated) DEVICE — DRSG TEGADERM 2 3/8X2 3/4" 1624W

## (undated) DEVICE — DRSG PRIMAPORE 02X3" 7133

## (undated) DEVICE — RETR BLADE LONE STAR 16X20MM 4 FINGER 3334-4G

## (undated) DEVICE — BLADE KNIFE SURG 11 WITH HANDLE 4-411

## (undated) DEVICE — CLIP HORIZON MED BLUE 002200

## (undated) DEVICE — SU VICRYL 4-0 P-3 18" UND  J494H

## (undated) DEVICE — PACK CENTRAL LINE INSERTION SAN32CLFCG

## (undated) DEVICE — ESU PENCIL SMOKE EVAC W/ROCKER SWITCH 0703-047-000

## (undated) DEVICE — SPONGE LAP 18X18" X8435

## (undated) DEVICE — PREP SKIN SCRUB TRAY 4461A

## (undated) DEVICE — ESU LIGASURE OPEN SEALER/DIVIDER SM JAW 16.5MM LF1212A

## (undated) DEVICE — SU PROLENE 5-0 RB-1DA 36"  8556H

## (undated) DEVICE — SU ETHILON 5-0 PC-3 18" 1865G

## (undated) DEVICE — RETR RING LONE STAR 25X25CM 3310G

## (undated) DEVICE — GLOVE PROTEXIS BLUE W/NEU-THERA 7.5  2D73EB75

## (undated) DEVICE — DECANTER VIAL 2006S

## (undated) DEVICE — PREP POVIDONE IODINE SOLUTION 10% 120ML

## (undated) DEVICE — SU ETHILON 5-0 P-3 18" BLACK 698G

## (undated) DEVICE — TUBING SUCTION 10'X3/16" N510

## (undated) DEVICE — SPECIMEN CONTAINER 5OZ STERILE 2600SA

## (undated) DEVICE — SU SILK 3-0 SH CR 8X18" C013D

## (undated) DEVICE — CATH ANGIO WEDGE PRESSURE 7FRX110CM DL AI-07127

## (undated) DEVICE — BLADE KNIFE SURG 15 371115

## (undated) DEVICE — SUCTION TIP YANKAUER STR K87

## (undated) DEVICE — PACK AV FISTULA

## (undated) DEVICE — DILATOR VASC W/INTRO GW 5FRX19CM .038" 405500

## (undated) DEVICE — SU ETHILON 4-0 P-3 18" BLACK 699G

## (undated) DEVICE — INTRO SHEATH MICRO PLATINUM TIP 4FRX40CM 7274

## (undated) DEVICE — SU VICRYL 2-0 SH 27" UND J417H

## (undated) DEVICE — SPONGE RAY-TEC 4X8" 7318

## (undated) DEVICE — GLOVE PROTEXIS POWDER FREE SMT 7.0  2D72PT70X

## (undated) DEVICE — SOL NACL 0.9% INJ 250ML BAG 2B1322Q

## (undated) DEVICE — DRSG TELFA 3X8" 1238

## (undated) DEVICE — KIT HAND CONTROL ACIST 014644 AR-P54

## (undated) DEVICE — CATH ANGIO 6FR 100CM MULPUR A1 SH 533640

## (undated) DEVICE — LABEL MEDICATION SYSTEM 3303-P

## (undated) DEVICE — POWER PORT CLEAR VUE

## (undated) DEVICE — GLOVE PROTEXIS POWDER FREE SMT 7.5  2D72PT75X

## (undated) DEVICE — GLOVE PROTEXIS MICRO 7.5  2D73PM75

## (undated) DEVICE — GLOVE PROTEXIS BLUE W/NEU-THERA 8.0  2D73EB80

## (undated) DEVICE — INTRODUCER SHEATH 4FRX40CM MICROPUNC PED G47946

## (undated) DEVICE — KNIFE HANDLE W/15 BLADE 371615

## (undated) DEVICE — DRSG XEROFORM 5X9" CUR253590W

## (undated) DEVICE — MANIFOLD KIT ANGIO AUTOMATED 014613

## (undated) RX ORDER — SODIUM CHLORIDE 9 MG/ML
INJECTION, SOLUTION INTRAVENOUS
Status: DISPENSED
Start: 2023-02-28

## (undated) RX ORDER — DIPHENHYDRAMINE HYDROCHLORIDE 50 MG/ML
INJECTION INTRAMUSCULAR; INTRAVENOUS
Status: DISPENSED
Start: 2023-02-28

## (undated) RX ORDER — LIDOCAINE HYDROCHLORIDE 20 MG/ML
INJECTION, SOLUTION INFILTRATION; PERINEURAL
Status: DISPENSED
Start: 2023-02-28

## (undated) RX ORDER — CEFAZOLIN SODIUM 2 G/100ML
INJECTION, SOLUTION INTRAVENOUS
Status: DISPENSED
Start: 2023-02-28

## (undated) RX ORDER — HEPARIN SODIUM 1000 [USP'U]/ML
INJECTION, SOLUTION INTRAVENOUS; SUBCUTANEOUS
Status: DISPENSED
Start: 2018-08-17

## (undated) RX ORDER — PROPOFOL 10 MG/ML
INJECTION, EMULSION INTRAVENOUS
Status: DISPENSED
Start: 2018-08-17

## (undated) RX ORDER — PROPOFOL 10 MG/ML
INJECTION, EMULSION INTRAVENOUS
Status: DISPENSED
Start: 2017-11-07

## (undated) RX ORDER — CEFAZOLIN SODIUM 1 G/3ML
INJECTION, POWDER, FOR SOLUTION INTRAMUSCULAR; INTRAVENOUS
Status: DISPENSED
Start: 2023-02-28

## (undated) RX ORDER — OXYCODONE HYDROCHLORIDE 5 MG/1
TABLET ORAL
Status: DISPENSED
Start: 2018-08-17

## (undated) RX ORDER — LIDOCAINE 40 MG/G
CREAM TOPICAL
Status: DISPENSED
Start: 2019-04-19

## (undated) RX ORDER — HEPARIN SODIUM (PORCINE) LOCK FLUSH IV SOLN 100 UNIT/ML 100 UNIT/ML
SOLUTION INTRAVENOUS
Status: DISPENSED
Start: 2017-12-18

## (undated) RX ORDER — HYDROMORPHONE HYDROCHLORIDE 1 MG/ML
INJECTION, SOLUTION INTRAMUSCULAR; INTRAVENOUS; SUBCUTANEOUS
Status: DISPENSED
Start: 2018-08-17

## (undated) RX ORDER — HYDROMORPHONE HYDROCHLORIDE 1 MG/ML
INJECTION, SOLUTION INTRAMUSCULAR; INTRAVENOUS; SUBCUTANEOUS
Status: DISPENSED
Start: 2017-01-26

## (undated) RX ORDER — GABAPENTIN 300 MG/1
CAPSULE ORAL
Status: DISPENSED
Start: 2017-04-03

## (undated) RX ORDER — FENTANYL CITRATE 50 UG/ML
INJECTION, SOLUTION INTRAMUSCULAR; INTRAVENOUS
Status: DISPENSED
Start: 2017-01-11

## (undated) RX ORDER — HEPARIN SODIUM (PORCINE) LOCK FLUSH IV SOLN 100 UNIT/ML 100 UNIT/ML
SOLUTION INTRAVENOUS
Status: DISPENSED
Start: 2018-01-30

## (undated) RX ORDER — OXYCODONE AND ACETAMINOPHEN 5; 325 MG/1; MG/1
TABLET ORAL
Status: DISPENSED
Start: 2017-11-07

## (undated) RX ORDER — ONDANSETRON 2 MG/ML
INJECTION INTRAMUSCULAR; INTRAVENOUS
Status: DISPENSED
Start: 2023-02-28

## (undated) RX ORDER — HEPARIN SODIUM (PORCINE) LOCK FLUSH IV SOLN 100 UNIT/ML 100 UNIT/ML
SOLUTION INTRAVENOUS
Status: DISPENSED
Start: 2019-01-08

## (undated) RX ORDER — ACETAMINOPHEN 325 MG/1
TABLET ORAL
Status: DISPENSED
Start: 2019-01-22

## (undated) RX ORDER — LIDOCAINE HYDROCHLORIDE AND EPINEPHRINE 5; 5 MG/ML; UG/ML
INJECTION, SOLUTION INFILTRATION; PERINEURAL
Status: DISPENSED
Start: 2017-11-07

## (undated) RX ORDER — CEFAZOLIN SODIUM 1 G/3ML
INJECTION, POWDER, FOR SOLUTION INTRAMUSCULAR; INTRAVENOUS
Status: DISPENSED
Start: 2017-04-03

## (undated) RX ORDER — CEFAZOLIN SODIUM 2 G/100ML
INJECTION, SOLUTION INTRAVENOUS
Status: DISPENSED
Start: 2018-08-17

## (undated) RX ORDER — FENTANYL CITRATE 50 UG/ML
INJECTION, SOLUTION INTRAMUSCULAR; INTRAVENOUS
Status: DISPENSED
Start: 2017-08-07

## (undated) RX ORDER — FENTANYL CITRATE 50 UG/ML
INJECTION, SOLUTION INTRAMUSCULAR; INTRAVENOUS
Status: DISPENSED
Start: 2017-01-26

## (undated) RX ORDER — HEPARIN SODIUM (PORCINE) LOCK FLUSH IV SOLN 100 UNIT/ML 100 UNIT/ML
SOLUTION INTRAVENOUS
Status: DISPENSED
Start: 2018-09-26

## (undated) RX ORDER — SODIUM CHLORIDE 9 MG/ML
INJECTION, SOLUTION INTRAVENOUS
Status: DISPENSED
Start: 2017-01-11

## (undated) RX ORDER — FENTANYL CITRATE 50 UG/ML
INJECTION, SOLUTION INTRAMUSCULAR; INTRAVENOUS
Status: DISPENSED
Start: 2023-02-28

## (undated) RX ORDER — FENTANYL CITRATE 50 UG/ML
INJECTION, SOLUTION INTRAMUSCULAR; INTRAVENOUS
Status: DISPENSED
Start: 2018-03-07

## (undated) RX ORDER — GABAPENTIN 300 MG/1
CAPSULE ORAL
Status: DISPENSED
Start: 2019-01-22

## (undated) RX ORDER — FENTANYL CITRATE 50 UG/ML
INJECTION, SOLUTION INTRAMUSCULAR; INTRAVENOUS
Status: DISPENSED
Start: 2017-11-07

## (undated) RX ORDER — FENTANYL CITRATE 50 UG/ML
INJECTION, SOLUTION INTRAMUSCULAR; INTRAVENOUS
Status: DISPENSED
Start: 2018-08-17

## (undated) RX ORDER — ACETAMINOPHEN 325 MG/1
TABLET ORAL
Status: DISPENSED
Start: 2018-08-17

## (undated) RX ORDER — CEFAZOLIN SODIUM 2 G/100ML
INJECTION, SOLUTION INTRAVENOUS
Status: DISPENSED
Start: 2017-08-07

## (undated) RX ORDER — ACETAMINOPHEN 325 MG/1
TABLET ORAL
Status: DISPENSED
Start: 2017-04-03

## (undated) RX ORDER — HYDROCODONE BITARTRATE AND ACETAMINOPHEN 5; 325 MG/1; MG/1
TABLET ORAL
Status: DISPENSED
Start: 2017-01-26